# Patient Record
Sex: MALE | Race: WHITE | Employment: OTHER | URBAN - METROPOLITAN AREA
[De-identification: names, ages, dates, MRNs, and addresses within clinical notes are randomized per-mention and may not be internally consistent; named-entity substitution may affect disease eponyms.]

---

## 2017-01-01 ENCOUNTER — ALLIED HEALTH/NURSE VISIT (OUTPATIENT)
Dept: EDUCATION SERVICES | Facility: CLINIC | Age: 82
End: 2017-01-01
Payer: COMMERCIAL

## 2017-01-01 ENCOUNTER — TELEPHONE (OUTPATIENT)
Dept: FAMILY MEDICINE | Facility: CLINIC | Age: 82
End: 2017-01-01

## 2017-01-01 ENCOUNTER — DOCUMENTATION ONLY (OUTPATIENT)
Dept: PHARMACY | Facility: CLINIC | Age: 82
End: 2017-01-01

## 2017-01-01 ENCOUNTER — HOSPITAL ENCOUNTER (OUTPATIENT)
Facility: CLINIC | Age: 82
Setting detail: SPECIMEN
Discharge: HOME | End: 2017-11-29
Attending: INTERNAL MEDICINE | Admitting: INTERNAL MEDICINE
Payer: MEDICARE

## 2017-01-01 ENCOUNTER — SURGERY (OUTPATIENT)
Age: 82
End: 2017-01-01

## 2017-01-01 ENCOUNTER — HOSPITAL ENCOUNTER (OUTPATIENT)
Facility: CLINIC | Age: 82
Discharge: HOME | End: 2017-11-01
Attending: OPHTHALMOLOGY | Admitting: OPHTHALMOLOGY
Payer: MEDICARE

## 2017-01-01 ENCOUNTER — HOSPITAL ENCOUNTER (OUTPATIENT)
Facility: CLINIC | Age: 82
Setting detail: SPECIMEN
Discharge: HOME | End: 2017-08-16
Attending: INTERNAL MEDICINE | Admitting: INTERNAL MEDICINE
Payer: MEDICARE

## 2017-01-01 ENCOUNTER — HOSPITAL ENCOUNTER (OUTPATIENT)
Facility: CLINIC | Age: 82
Setting detail: SPECIMEN
Discharge: HOME | End: 2017-07-05
Attending: INTERNAL MEDICINE | Admitting: INTERNAL MEDICINE
Payer: MEDICARE

## 2017-01-01 ENCOUNTER — HOSPITAL ENCOUNTER (OUTPATIENT)
Facility: CLINIC | Age: 82
Setting detail: SPECIMEN
Discharge: HOME | End: 2017-11-08
Attending: INTERNAL MEDICINE | Admitting: INTERNAL MEDICINE
Payer: MEDICARE

## 2017-01-01 ENCOUNTER — OFFICE VISIT (OUTPATIENT)
Dept: FAMILY MEDICINE | Facility: CLINIC | Age: 82
End: 2017-01-01
Payer: COMMERCIAL

## 2017-01-01 ENCOUNTER — ANESTHESIA (OUTPATIENT)
Dept: SURGERY | Facility: CLINIC | Age: 82
End: 2017-01-01
Payer: MEDICARE

## 2017-01-01 ENCOUNTER — TELEPHONE (OUTPATIENT)
Dept: ONCOLOGY | Facility: CLINIC | Age: 82
End: 2017-01-01

## 2017-01-01 ENCOUNTER — INFUSION THERAPY VISIT (OUTPATIENT)
Dept: INFUSION THERAPY | Facility: CLINIC | Age: 82
End: 2017-01-01
Attending: INTERNAL MEDICINE
Payer: MEDICARE

## 2017-01-01 ENCOUNTER — HOSPITAL ENCOUNTER (OUTPATIENT)
Dept: PHYSICAL THERAPY | Facility: CLINIC | Age: 82
Setting detail: THERAPIES SERIES
End: 2017-06-06
Attending: INTERNAL MEDICINE
Payer: MEDICARE

## 2017-01-01 ENCOUNTER — ONCOLOGY VISIT (OUTPATIENT)
Dept: ONCOLOGY | Facility: CLINIC | Age: 82
End: 2017-01-01
Attending: INTERNAL MEDICINE
Payer: MEDICARE

## 2017-01-01 ENCOUNTER — HOSPITAL ENCOUNTER (OUTPATIENT)
Dept: PHYSICAL THERAPY | Facility: CLINIC | Age: 82
Setting detail: THERAPIES SERIES
End: 2017-06-27
Attending: INTERNAL MEDICINE
Payer: MEDICARE

## 2017-01-01 ENCOUNTER — HOSPITAL ENCOUNTER (OUTPATIENT)
Facility: CLINIC | Age: 82
Setting detail: SPECIMEN
Discharge: HOME | End: 2017-05-31
Attending: INTERNAL MEDICINE | Admitting: INTERNAL MEDICINE
Payer: MEDICARE

## 2017-01-01 ENCOUNTER — HOSPITAL ENCOUNTER (OUTPATIENT)
Dept: PHYSICAL THERAPY | Facility: CLINIC | Age: 82
Setting detail: THERAPIES SERIES
End: 2017-06-13
Attending: INTERNAL MEDICINE
Payer: MEDICARE

## 2017-01-01 ENCOUNTER — OFFICE VISIT (OUTPATIENT)
Dept: URGENT CARE | Facility: URGENT CARE | Age: 82
End: 2017-01-01
Payer: COMMERCIAL

## 2017-01-01 ENCOUNTER — ONCOLOGY VISIT (OUTPATIENT)
Dept: ONCOLOGY | Facility: CLINIC | Age: 82
End: 2017-01-01
Attending: INTERNAL MEDICINE
Payer: COMMERCIAL

## 2017-01-01 ENCOUNTER — HOSPITAL ENCOUNTER (OUTPATIENT)
Facility: CLINIC | Age: 82
Setting detail: SPECIMEN
Discharge: HOME | End: 2017-06-14
Attending: INTERNAL MEDICINE | Admitting: INTERNAL MEDICINE
Payer: MEDICARE

## 2017-01-01 ENCOUNTER — TRANSFERRED RECORDS (OUTPATIENT)
Dept: HEALTH INFORMATION MANAGEMENT | Facility: CLINIC | Age: 82
End: 2017-01-01

## 2017-01-01 ENCOUNTER — HOSPITAL ENCOUNTER (OUTPATIENT)
Dept: PHYSICAL THERAPY | Facility: CLINIC | Age: 82
Setting detail: THERAPIES SERIES
End: 2017-06-20
Attending: INTERNAL MEDICINE
Payer: MEDICARE

## 2017-01-01 ENCOUNTER — HOSPITAL ENCOUNTER (OUTPATIENT)
Facility: CLINIC | Age: 82
Setting detail: SPECIMEN
Discharge: HOME | End: 2017-06-07
Attending: INTERNAL MEDICINE | Admitting: INTERNAL MEDICINE
Payer: MEDICARE

## 2017-01-01 ENCOUNTER — HOSPITAL ENCOUNTER (OUTPATIENT)
Dept: PHYSICAL THERAPY | Facility: CLINIC | Age: 82
Setting detail: THERAPIES SERIES
End: 2017-07-11
Attending: INTERNAL MEDICINE
Payer: MEDICARE

## 2017-01-01 ENCOUNTER — HOSPITAL ENCOUNTER (OUTPATIENT)
Facility: CLINIC | Age: 82
Setting detail: SPECIMEN
Discharge: HOME | End: 2017-07-26
Attending: INTERNAL MEDICINE | Admitting: INTERNAL MEDICINE
Payer: MEDICARE

## 2017-01-01 ENCOUNTER — HOSPITAL ENCOUNTER (OUTPATIENT)
Facility: CLINIC | Age: 82
Setting detail: SPECIMEN
Discharge: HOME | End: 2017-10-18
Attending: INTERNAL MEDICINE | Admitting: INTERNAL MEDICINE
Payer: MEDICARE

## 2017-01-01 ENCOUNTER — HOSPITAL ENCOUNTER (OUTPATIENT)
Facility: CLINIC | Age: 82
Setting detail: SPECIMEN
Discharge: HOME | End: 2017-09-06
Attending: INTERNAL MEDICINE | Admitting: INTERNAL MEDICINE
Payer: MEDICARE

## 2017-01-01 ENCOUNTER — HOSPITAL ENCOUNTER (OUTPATIENT)
Dept: PHYSICAL THERAPY | Facility: CLINIC | Age: 82
Setting detail: THERAPIES SERIES
End: 2017-07-18
Attending: INTERNAL MEDICINE
Payer: MEDICARE

## 2017-01-01 ENCOUNTER — HOSPITAL ENCOUNTER (OUTPATIENT)
Facility: CLINIC | Age: 82
Discharge: HOME | End: 2017-10-25
Attending: OPHTHALMOLOGY | Admitting: OPHTHALMOLOGY
Payer: MEDICARE

## 2017-01-01 ENCOUNTER — HOSPITAL ENCOUNTER (OUTPATIENT)
Facility: CLINIC | Age: 82
Setting detail: SPECIMEN
Discharge: HOME | End: 2017-08-22
Attending: INTERNAL MEDICINE | Admitting: INTERNAL MEDICINE
Payer: MEDICARE

## 2017-01-01 ENCOUNTER — ANESTHESIA EVENT (OUTPATIENT)
Dept: SURGERY | Facility: CLINIC | Age: 82
End: 2017-01-01
Payer: MEDICARE

## 2017-01-01 ENCOUNTER — HOSPITAL ENCOUNTER (OUTPATIENT)
Dept: PHYSICAL THERAPY | Facility: CLINIC | Age: 82
Setting detail: THERAPIES SERIES
End: 2017-05-30
Attending: INTERNAL MEDICINE
Payer: MEDICARE

## 2017-01-01 ENCOUNTER — HOSPITAL ENCOUNTER (OUTPATIENT)
Facility: CLINIC | Age: 82
Setting detail: SPECIMEN
Discharge: HOME | End: 2017-06-21
Attending: INTERNAL MEDICINE | Admitting: INTERNAL MEDICINE
Payer: MEDICARE

## 2017-01-01 ENCOUNTER — TELEPHONE (OUTPATIENT)
Dept: EDUCATION SERVICES | Facility: CLINIC | Age: 82
End: 2017-01-01

## 2017-01-01 ENCOUNTER — HOSPITAL ENCOUNTER (OUTPATIENT)
Facility: CLINIC | Age: 82
Setting detail: SPECIMEN
Discharge: HOME | End: 2017-08-09
Attending: INTERNAL MEDICINE | Admitting: INTERNAL MEDICINE
Payer: MEDICARE

## 2017-01-01 ENCOUNTER — HOSPITAL ENCOUNTER (OUTPATIENT)
Facility: CLINIC | Age: 82
Setting detail: SPECIMEN
Discharge: HOME | End: 2017-06-28
Attending: INTERNAL MEDICINE | Admitting: INTERNAL MEDICINE
Payer: MEDICARE

## 2017-01-01 ENCOUNTER — HOSPITAL ENCOUNTER (OUTPATIENT)
Facility: CLINIC | Age: 82
Setting detail: SPECIMEN
Discharge: HOME | End: 2017-09-27
Attending: INTERNAL MEDICINE | Admitting: INTERNAL MEDICINE
Payer: MEDICARE

## 2017-01-01 ENCOUNTER — HOSPITAL ENCOUNTER (OUTPATIENT)
Dept: PHYSICAL THERAPY | Facility: CLINIC | Age: 82
Setting detail: THERAPIES SERIES
End: 2017-07-06
Attending: INTERNAL MEDICINE
Payer: MEDICARE

## 2017-01-01 ENCOUNTER — RADIANT APPOINTMENT (OUTPATIENT)
Dept: GENERAL RADIOLOGY | Facility: CLINIC | Age: 82
End: 2017-01-01
Attending: FAMILY MEDICINE
Payer: COMMERCIAL

## 2017-01-01 ENCOUNTER — HOSPITAL ENCOUNTER (OUTPATIENT)
Facility: CLINIC | Age: 82
Setting detail: SPECIMEN
Discharge: HOME | End: 2017-12-20
Attending: INTERNAL MEDICINE | Admitting: INTERNAL MEDICINE
Payer: MEDICARE

## 2017-01-01 VITALS
TEMPERATURE: 98 F | SYSTOLIC BLOOD PRESSURE: 121 MMHG | BODY MASS INDEX: 29.21 KG/M2 | WEIGHT: 203.6 LBS | RESPIRATION RATE: 16 BRPM | HEART RATE: 81 BPM | DIASTOLIC BLOOD PRESSURE: 58 MMHG

## 2017-01-01 VITALS
SYSTOLIC BLOOD PRESSURE: 120 MMHG | TEMPERATURE: 98 F | WEIGHT: 200 LBS | BODY MASS INDEX: 28.7 KG/M2 | DIASTOLIC BLOOD PRESSURE: 60 MMHG | HEART RATE: 80 BPM | RESPIRATION RATE: 16 BRPM

## 2017-01-01 VITALS
RESPIRATION RATE: 16 BRPM | WEIGHT: 197 LBS | DIASTOLIC BLOOD PRESSURE: 60 MMHG | BODY MASS INDEX: 28.27 KG/M2 | SYSTOLIC BLOOD PRESSURE: 120 MMHG | TEMPERATURE: 98 F | HEART RATE: 67 BPM

## 2017-01-01 VITALS
BODY MASS INDEX: 29.99 KG/M2 | RESPIRATION RATE: 16 BRPM | DIASTOLIC BLOOD PRESSURE: 67 MMHG | SYSTOLIC BLOOD PRESSURE: 150 MMHG | TEMPERATURE: 97.9 F | WEIGHT: 209 LBS | OXYGEN SATURATION: 95 %

## 2017-01-01 VITALS
HEART RATE: 65 BPM | DIASTOLIC BLOOD PRESSURE: 91 MMHG | TEMPERATURE: 98.8 F | SYSTOLIC BLOOD PRESSURE: 139 MMHG | RESPIRATION RATE: 14 BRPM

## 2017-01-01 VITALS
DIASTOLIC BLOOD PRESSURE: 70 MMHG | SYSTOLIC BLOOD PRESSURE: 142 MMHG | WEIGHT: 196 LBS | HEIGHT: 70 IN | BODY MASS INDEX: 28.06 KG/M2 | TEMPERATURE: 97.9 F | HEART RATE: 65 BPM | RESPIRATION RATE: 16 BRPM

## 2017-01-01 VITALS
SYSTOLIC BLOOD PRESSURE: 121 MMHG | BODY MASS INDEX: 28.55 KG/M2 | DIASTOLIC BLOOD PRESSURE: 53 MMHG | OXYGEN SATURATION: 98 % | RESPIRATION RATE: 18 BRPM | HEIGHT: 70 IN | TEMPERATURE: 98.2 F | WEIGHT: 199.4 LBS | HEART RATE: 72 BPM

## 2017-01-01 VITALS
DIASTOLIC BLOOD PRESSURE: 65 MMHG | HEART RATE: 63 BPM | OXYGEN SATURATION: 96 % | TEMPERATURE: 98.5 F | RESPIRATION RATE: 16 BRPM | SYSTOLIC BLOOD PRESSURE: 143 MMHG

## 2017-01-01 VITALS
BODY MASS INDEX: 27.83 KG/M2 | SYSTOLIC BLOOD PRESSURE: 145 MMHG | DIASTOLIC BLOOD PRESSURE: 62 MMHG | WEIGHT: 194.4 LBS | HEART RATE: 68 BPM | RESPIRATION RATE: 16 BRPM | HEIGHT: 70 IN | TEMPERATURE: 97.8 F

## 2017-01-01 VITALS
BODY MASS INDEX: 27.8 KG/M2 | DIASTOLIC BLOOD PRESSURE: 68 MMHG | SYSTOLIC BLOOD PRESSURE: 135 MMHG | RESPIRATION RATE: 16 BRPM | HEIGHT: 70 IN | TEMPERATURE: 96.9 F | OXYGEN SATURATION: 97 % | HEART RATE: 61 BPM | WEIGHT: 194.2 LBS

## 2017-01-01 VITALS
BODY MASS INDEX: 28.03 KG/M2 | HEART RATE: 64 BPM | HEIGHT: 70 IN | WEIGHT: 195.8 LBS | TEMPERATURE: 98.2 F | DIASTOLIC BLOOD PRESSURE: 62 MMHG | SYSTOLIC BLOOD PRESSURE: 122 MMHG | RESPIRATION RATE: 16 BRPM

## 2017-01-01 VITALS
WEIGHT: 206 LBS | BODY MASS INDEX: 29.49 KG/M2 | RESPIRATION RATE: 16 BRPM | SYSTOLIC BLOOD PRESSURE: 110 MMHG | TEMPERATURE: 97.2 F | OXYGEN SATURATION: 99 % | HEIGHT: 70 IN | DIASTOLIC BLOOD PRESSURE: 54 MMHG | HEART RATE: 72 BPM

## 2017-01-01 VITALS
SYSTOLIC BLOOD PRESSURE: 138 MMHG | OXYGEN SATURATION: 98 % | BODY MASS INDEX: 29.7 KG/M2 | RESPIRATION RATE: 20 BRPM | HEART RATE: 75 BPM | DIASTOLIC BLOOD PRESSURE: 68 MMHG | WEIGHT: 207 LBS

## 2017-01-01 VITALS
BODY MASS INDEX: 28.27 KG/M2 | HEART RATE: 80 BPM | SYSTOLIC BLOOD PRESSURE: 106 MMHG | RESPIRATION RATE: 16 BRPM | TEMPERATURE: 99.5 F | DIASTOLIC BLOOD PRESSURE: 56 MMHG | WEIGHT: 197 LBS

## 2017-01-01 VITALS
WEIGHT: 205.6 LBS | HEART RATE: 77 BPM | BODY MASS INDEX: 29.5 KG/M2 | SYSTOLIC BLOOD PRESSURE: 152 MMHG | DIASTOLIC BLOOD PRESSURE: 73 MMHG | RESPIRATION RATE: 16 BRPM

## 2017-01-01 VITALS
OXYGEN SATURATION: 99 % | BODY MASS INDEX: 28.7 KG/M2 | TEMPERATURE: 98.1 F | DIASTOLIC BLOOD PRESSURE: 68 MMHG | WEIGHT: 200 LBS | HEART RATE: 82 BPM | SYSTOLIC BLOOD PRESSURE: 139 MMHG

## 2017-01-01 VITALS
WEIGHT: 190 LBS | BODY MASS INDEX: 27.26 KG/M2 | SYSTOLIC BLOOD PRESSURE: 128 MMHG | HEART RATE: 63 BPM | DIASTOLIC BLOOD PRESSURE: 60 MMHG | RESPIRATION RATE: 16 BRPM | TEMPERATURE: 98 F

## 2017-01-01 VITALS
HEART RATE: 81 BPM | DIASTOLIC BLOOD PRESSURE: 58 MMHG | SYSTOLIC BLOOD PRESSURE: 121 MMHG | RESPIRATION RATE: 16 BRPM | TEMPERATURE: 98.3 F

## 2017-01-01 VITALS
RESPIRATION RATE: 16 BRPM | SYSTOLIC BLOOD PRESSURE: 152 MMHG | DIASTOLIC BLOOD PRESSURE: 73 MMHG | WEIGHT: 205.6 LBS | BODY MASS INDEX: 29.5 KG/M2 | HEART RATE: 77 BPM

## 2017-01-01 VITALS
TEMPERATURE: 97.7 F | HEART RATE: 61 BPM | BODY MASS INDEX: 27.86 KG/M2 | WEIGHT: 194.2 LBS | SYSTOLIC BLOOD PRESSURE: 150 MMHG | OXYGEN SATURATION: 94 % | RESPIRATION RATE: 19 BRPM | DIASTOLIC BLOOD PRESSURE: 72 MMHG

## 2017-01-01 VITALS
HEIGHT: 70 IN | BODY MASS INDEX: 27.8 KG/M2 | TEMPERATURE: 97.7 F | SYSTOLIC BLOOD PRESSURE: 150 MMHG | OXYGEN SATURATION: 94 % | WEIGHT: 194.2 LBS | HEART RATE: 61 BPM | RESPIRATION RATE: 18 BRPM | DIASTOLIC BLOOD PRESSURE: 72 MMHG

## 2017-01-01 VITALS
WEIGHT: 203 LBS | HEIGHT: 70 IN | BODY MASS INDEX: 29.06 KG/M2 | SYSTOLIC BLOOD PRESSURE: 124 MMHG | RESPIRATION RATE: 16 BRPM | DIASTOLIC BLOOD PRESSURE: 62 MMHG | OXYGEN SATURATION: 98 % | HEART RATE: 80 BPM | TEMPERATURE: 97 F

## 2017-01-01 VITALS
TEMPERATURE: 97 F | OXYGEN SATURATION: 97 % | WEIGHT: 209.1 LBS | BODY MASS INDEX: 29.94 KG/M2 | DIASTOLIC BLOOD PRESSURE: 69 MMHG | HEART RATE: 64 BPM | RESPIRATION RATE: 16 BRPM | HEIGHT: 70 IN | SYSTOLIC BLOOD PRESSURE: 152 MMHG

## 2017-01-01 VITALS
HEART RATE: 71 BPM | TEMPERATURE: 98.5 F | RESPIRATION RATE: 16 BRPM | SYSTOLIC BLOOD PRESSURE: 147 MMHG | DIASTOLIC BLOOD PRESSURE: 68 MMHG

## 2017-01-01 VITALS
WEIGHT: 199.4 LBS | DIASTOLIC BLOOD PRESSURE: 53 MMHG | TEMPERATURE: 98.2 F | HEIGHT: 70 IN | RESPIRATION RATE: 18 BRPM | SYSTOLIC BLOOD PRESSURE: 121 MMHG | HEART RATE: 72 BPM | OXYGEN SATURATION: 98 % | BODY MASS INDEX: 28.55 KG/M2

## 2017-01-01 VITALS
HEART RATE: 65 BPM | TEMPERATURE: 97.3 F | RESPIRATION RATE: 16 BRPM | SYSTOLIC BLOOD PRESSURE: 130 MMHG | BODY MASS INDEX: 28.49 KG/M2 | DIASTOLIC BLOOD PRESSURE: 70 MMHG | WEIGHT: 199 LBS | HEIGHT: 70 IN

## 2017-01-01 VITALS
OXYGEN SATURATION: 97 % | HEIGHT: 70 IN | DIASTOLIC BLOOD PRESSURE: 54 MMHG | WEIGHT: 205 LBS | SYSTOLIC BLOOD PRESSURE: 128 MMHG | TEMPERATURE: 98.5 F | BODY MASS INDEX: 29.35 KG/M2 | HEART RATE: 73 BPM

## 2017-01-01 VITALS
HEART RATE: 70 BPM | SYSTOLIC BLOOD PRESSURE: 160 MMHG | RESPIRATION RATE: 18 BRPM | TEMPERATURE: 98.2 F | DIASTOLIC BLOOD PRESSURE: 72 MMHG

## 2017-01-01 VITALS
DIASTOLIC BLOOD PRESSURE: 70 MMHG | SYSTOLIC BLOOD PRESSURE: 142 MMHG | BODY MASS INDEX: 28.06 KG/M2 | HEIGHT: 70 IN | TEMPERATURE: 97.9 F | WEIGHT: 196 LBS | HEART RATE: 65 BPM | RESPIRATION RATE: 16 BRPM

## 2017-01-01 VITALS
TEMPERATURE: 98.1 F | RESPIRATION RATE: 16 BRPM | SYSTOLIC BLOOD PRESSURE: 137 MMHG | BODY MASS INDEX: 30 KG/M2 | HEART RATE: 72 BPM | WEIGHT: 209.1 LBS | DIASTOLIC BLOOD PRESSURE: 68 MMHG | OXYGEN SATURATION: 96 %

## 2017-01-01 VITALS
SYSTOLIC BLOOD PRESSURE: 147 MMHG | HEART RATE: 62 BPM | BODY MASS INDEX: 29.99 KG/M2 | RESPIRATION RATE: 16 BRPM | DIASTOLIC BLOOD PRESSURE: 71 MMHG | WEIGHT: 209 LBS | TEMPERATURE: 98.5 F | OXYGEN SATURATION: 96 %

## 2017-01-01 VITALS
HEART RATE: 82 BPM | BODY MASS INDEX: 29.27 KG/M2 | WEIGHT: 204 LBS | DIASTOLIC BLOOD PRESSURE: 50 MMHG | RESPIRATION RATE: 14 BRPM | TEMPERATURE: 99.1 F | SYSTOLIC BLOOD PRESSURE: 110 MMHG

## 2017-01-01 VITALS
RESPIRATION RATE: 16 BRPM | DIASTOLIC BLOOD PRESSURE: 55 MMHG | HEART RATE: 69 BPM | SYSTOLIC BLOOD PRESSURE: 123 MMHG | OXYGEN SATURATION: 98 % | TEMPERATURE: 98.2 F

## 2017-01-01 VITALS
TEMPERATURE: 97.9 F | HEART RATE: 82 BPM | RESPIRATION RATE: 18 BRPM | SYSTOLIC BLOOD PRESSURE: 160 MMHG | DIASTOLIC BLOOD PRESSURE: 62 MMHG

## 2017-01-01 DIAGNOSIS — E83.52 HYPERCALCEMIA: ICD-10-CM

## 2017-01-01 DIAGNOSIS — C90.00 MULTIPLE MYELOMA NOT HAVING ACHIEVED REMISSION (H): Primary | ICD-10-CM

## 2017-01-01 DIAGNOSIS — R07.89 CHEST WALL PAIN: Primary | ICD-10-CM

## 2017-01-01 DIAGNOSIS — Z79.4 TYPE 2 DIABETES MELLITUS WITH STAGE 3 CHRONIC KIDNEY DISEASE, WITH LONG-TERM CURRENT USE OF INSULIN (H): Chronic | ICD-10-CM

## 2017-01-01 DIAGNOSIS — N18.30 CKD (CHRONIC KIDNEY DISEASE) STAGE 3, GFR 30-59 ML/MIN (H): Chronic | ICD-10-CM

## 2017-01-01 DIAGNOSIS — T45.1X5A CHEMOTHERAPY-INDUCED NEUTROPENIA (H): ICD-10-CM

## 2017-01-01 DIAGNOSIS — E87.5 HYPERKALEMIA: ICD-10-CM

## 2017-01-01 DIAGNOSIS — R82.90 NONSPECIFIC FINDING ON EXAMINATION OF URINE: ICD-10-CM

## 2017-01-01 DIAGNOSIS — E83.52 HYPERCALCEMIA: Primary | ICD-10-CM

## 2017-01-01 DIAGNOSIS — D53.9 MACROCYTIC ANEMIA: ICD-10-CM

## 2017-01-01 DIAGNOSIS — I10 ESSENTIAL HYPERTENSION, BENIGN: ICD-10-CM

## 2017-01-01 DIAGNOSIS — C90.00 MULTIPLE MYELOMA NOT HAVING ACHIEVED REMISSION (H): ICD-10-CM

## 2017-01-01 DIAGNOSIS — N18.30 TYPE 2 DIABETES MELLITUS WITH STAGE 3 CHRONIC KIDNEY DISEASE, WITH LONG-TERM CURRENT USE OF INSULIN (H): Primary | ICD-10-CM

## 2017-01-01 DIAGNOSIS — N18.30 TYPE 2 DIABETES MELLITUS WITH STAGE 3 CHRONIC KIDNEY DISEASE, WITH LONG-TERM CURRENT USE OF INSULIN (H): Primary | Chronic | ICD-10-CM

## 2017-01-01 DIAGNOSIS — H25.89 OTHER AGE-RELATED CATARACT OF BOTH EYES: ICD-10-CM

## 2017-01-01 DIAGNOSIS — E11.22 TYPE 2 DIABETES MELLITUS WITH STAGE 3 CHRONIC KIDNEY DISEASE, WITH LONG-TERM CURRENT USE OF INSULIN (H): ICD-10-CM

## 2017-01-01 DIAGNOSIS — E11.65 UNCONTROLLED TYPE 2 DIABETES MELLITUS WITH HYPERGLYCEMIA, UNSPECIFIED LONG TERM INSULIN USE STATUS: ICD-10-CM

## 2017-01-01 DIAGNOSIS — N18.30 TYPE 2 DIABETES MELLITUS WITH STAGE 3 CHRONIC KIDNEY DISEASE, WITH LONG-TERM CURRENT USE OF INSULIN (H): ICD-10-CM

## 2017-01-01 DIAGNOSIS — E11.22 TYPE 2 DIABETES MELLITUS WITH STAGE 3 CHRONIC KIDNEY DISEASE, WITH LONG-TERM CURRENT USE OF INSULIN (H): Chronic | ICD-10-CM

## 2017-01-01 DIAGNOSIS — Z79.4 TYPE 2 DIABETES MELLITUS WITH STAGE 3 CHRONIC KIDNEY DISEASE, WITH LONG-TERM CURRENT USE OF INSULIN (H): ICD-10-CM

## 2017-01-01 DIAGNOSIS — Z79.4 TYPE 2 DIABETES MELLITUS WITH STAGE 3 CHRONIC KIDNEY DISEASE, WITH LONG-TERM CURRENT USE OF INSULIN (H): Primary | Chronic | ICD-10-CM

## 2017-01-01 DIAGNOSIS — N30.00 ACUTE CYSTITIS WITHOUT HEMATURIA: ICD-10-CM

## 2017-01-01 DIAGNOSIS — E11.22 TYPE 2 DIABETES MELLITUS WITH STAGE 3 CHRONIC KIDNEY DISEASE, WITH LONG-TERM CURRENT USE OF INSULIN (H): Primary | Chronic | ICD-10-CM

## 2017-01-01 DIAGNOSIS — G62.9 NEUROPATHY: Primary | ICD-10-CM

## 2017-01-01 DIAGNOSIS — D46.9 MDS (MYELODYSPLASTIC SYNDROME) (H): ICD-10-CM

## 2017-01-01 DIAGNOSIS — E11.22 TYPE 2 DIABETES MELLITUS WITH DIABETIC CHRONIC KIDNEY DISEASE, UNSPECIFIED CKD STAGE, UNSPECIFIED LONG TERM INSULIN USE STATUS: ICD-10-CM

## 2017-01-01 DIAGNOSIS — D46.9 MDS (MYELODYSPLASTIC SYNDROME) (H): Primary | ICD-10-CM

## 2017-01-01 DIAGNOSIS — E11.22 TYPE 2 DIABETES MELLITUS WITH STAGE 3 CHRONIC KIDNEY DISEASE, WITH LONG-TERM CURRENT USE OF INSULIN (H): Primary | ICD-10-CM

## 2017-01-01 DIAGNOSIS — E11.22 TYPE 2 DIABETES MELLITUS WITH DIABETIC CHRONIC KIDNEY DISEASE, UNSPECIFIED CKD STAGE, UNSPECIFIED LONG TERM INSULIN USE STATUS: Primary | ICD-10-CM

## 2017-01-01 DIAGNOSIS — E78.5 HYPERLIPIDEMIA: ICD-10-CM

## 2017-01-01 DIAGNOSIS — Z79.4 TYPE 2 DIABETES MELLITUS WITH DIABETIC NEPHROPATHY, WITH LONG-TERM CURRENT USE OF INSULIN (H): Primary | ICD-10-CM

## 2017-01-01 DIAGNOSIS — Z13.89 SCREENING FOR DIABETIC PERIPHERAL NEUROPATHY: ICD-10-CM

## 2017-01-01 DIAGNOSIS — N18.30 TYPE 2 DIABETES MELLITUS WITH STAGE 3 CHRONIC KIDNEY DISEASE, WITH LONG-TERM CURRENT USE OF INSULIN (H): Chronic | ICD-10-CM

## 2017-01-01 DIAGNOSIS — R30.0 DYSURIA: Primary | ICD-10-CM

## 2017-01-01 DIAGNOSIS — R07.89 CHEST WALL PAIN: ICD-10-CM

## 2017-01-01 DIAGNOSIS — Z01.818 PREOP GENERAL PHYSICAL EXAM: Primary | ICD-10-CM

## 2017-01-01 DIAGNOSIS — E87.5 HYPERKALEMIA: Primary | ICD-10-CM

## 2017-01-01 DIAGNOSIS — Z23 NEED FOR PROPHYLACTIC VACCINATION AND INOCULATION AGAINST INFLUENZA: ICD-10-CM

## 2017-01-01 DIAGNOSIS — K61.1 PERIRECTAL ABSCESS: Primary | ICD-10-CM

## 2017-01-01 DIAGNOSIS — N39.0 URINARY TRACT INFECTION WITHOUT HEMATURIA, SITE UNSPECIFIED: Primary | ICD-10-CM

## 2017-01-01 DIAGNOSIS — C67.9 MALIGNANT NEOPLASM OF URINARY BLADDER, UNSPECIFIED SITE (H): ICD-10-CM

## 2017-01-01 DIAGNOSIS — I10 ESSENTIAL HYPERTENSION, BENIGN: Primary | ICD-10-CM

## 2017-01-01 DIAGNOSIS — Z79.4 TYPE 2 DIABETES MELLITUS WITH STAGE 3 CHRONIC KIDNEY DISEASE, WITH LONG-TERM CURRENT USE OF INSULIN (H): Primary | ICD-10-CM

## 2017-01-01 DIAGNOSIS — D63.0 ANEMIA IN NEOPLASTIC DISEASE: ICD-10-CM

## 2017-01-01 DIAGNOSIS — D70.1 CHEMOTHERAPY-INDUCED NEUTROPENIA (H): ICD-10-CM

## 2017-01-01 DIAGNOSIS — C67.9 MALIGNANT NEOPLASM OF BLADDER (H): ICD-10-CM

## 2017-01-01 DIAGNOSIS — E11.21 TYPE 2 DIABETES MELLITUS WITH DIABETIC NEPHROPATHY, WITH LONG-TERM CURRENT USE OF INSULIN (H): Primary | ICD-10-CM

## 2017-01-01 DIAGNOSIS — R30.0 DYSURIA: ICD-10-CM

## 2017-01-01 DIAGNOSIS — I10 ESSENTIAL HYPERTENSION: ICD-10-CM

## 2017-01-01 LAB
ALBUMIN SERPL ELPH-MCNC: 3.4 G/DL (ref 3.7–5.1)
ALBUMIN SERPL ELPH-MCNC: 3.6 G/DL (ref 3.7–5.1)
ALBUMIN SERPL ELPH-MCNC: 3.8 G/DL (ref 3.7–5.1)
ALBUMIN SERPL ELPH-MCNC: 3.8 G/DL (ref 3.7–5.1)
ALBUMIN SERPL ELPH-MCNC: 4.1 G/DL (ref 3.7–5.1)
ALBUMIN SERPL ELPH-MCNC: NORMAL G/DL (ref 3.7–5.1)
ALBUMIN SERPL-MCNC: 2.7 G/DL (ref 3.4–5)
ALBUMIN SERPL-MCNC: 2.8 G/DL (ref 3.4–5)
ALBUMIN SERPL-MCNC: 2.9 G/DL (ref 3.4–5)
ALBUMIN SERPL-MCNC: 2.9 G/DL (ref 3.4–5)
ALBUMIN SERPL-MCNC: 3 G/DL (ref 3.4–5)
ALBUMIN SERPL-MCNC: 3.1 G/DL (ref 3.4–5)
ALBUMIN SERPL-MCNC: 3.2 G/DL (ref 3.4–5)
ALBUMIN UR-MCNC: ABNORMAL MG/DL
ALBUMIN UR-MCNC: ABNORMAL MG/DL
ALBUMIN UR-MCNC: NEGATIVE MG/DL
ALBUMIN UR-MCNC: NEGATIVE MG/DL
ALP SERPL-CCNC: 61 U/L (ref 40–150)
ALP SERPL-CCNC: 62 U/L (ref 40–150)
ALP SERPL-CCNC: 64 U/L (ref 40–150)
ALP SERPL-CCNC: 66 U/L (ref 40–150)
ALP SERPL-CCNC: 66 U/L (ref 40–150)
ALP SERPL-CCNC: 67 U/L (ref 40–150)
ALP SERPL-CCNC: 68 U/L (ref 40–150)
ALP SERPL-CCNC: 72 U/L (ref 40–150)
ALP SERPL-CCNC: 80 U/L (ref 40–150)
ALP SERPL-CCNC: 81 U/L (ref 40–150)
ALP SERPL-CCNC: 82 U/L (ref 40–150)
ALP SERPL-CCNC: 83 U/L (ref 40–150)
ALP SERPL-CCNC: 87 U/L (ref 40–150)
ALPHA1 GLOB SERPL ELPH-MCNC: 0.3 G/DL (ref 0.2–0.4)
ALPHA1 GLOB SERPL ELPH-MCNC: 0.4 G/DL (ref 0.2–0.4)
ALPHA1 GLOB SERPL ELPH-MCNC: 0.4 G/DL (ref 0.2–0.4)
ALPHA1 GLOB SERPL ELPH-MCNC: NORMAL G/DL (ref 0.2–0.4)
ALPHA2 GLOB SERPL ELPH-MCNC: 0.7 G/DL (ref 0.5–0.9)
ALPHA2 GLOB SERPL ELPH-MCNC: 0.7 G/DL (ref 0.5–0.9)
ALPHA2 GLOB SERPL ELPH-MCNC: 0.8 G/DL (ref 0.5–0.9)
ALPHA2 GLOB SERPL ELPH-MCNC: 0.8 G/DL (ref 0.5–0.9)
ALPHA2 GLOB SERPL ELPH-MCNC: 0.9 G/DL (ref 0.5–0.9)
ALPHA2 GLOB SERPL ELPH-MCNC: NORMAL G/DL (ref 0.5–0.9)
ALT SERPL W P-5'-P-CCNC: 12 U/L (ref 0–70)
ALT SERPL W P-5'-P-CCNC: 14 U/L (ref 0–70)
ALT SERPL W P-5'-P-CCNC: 14 U/L (ref 0–70)
ALT SERPL W P-5'-P-CCNC: 15 U/L (ref 0–70)
ALT SERPL W P-5'-P-CCNC: 16 U/L (ref 0–70)
ALT SERPL W P-5'-P-CCNC: 17 U/L (ref 0–70)
ALT SERPL W P-5'-P-CCNC: 18 U/L (ref 0–70)
ALT SERPL W P-5'-P-CCNC: 18 U/L (ref 0–70)
ANION GAP SERPL CALCULATED.3IONS-SCNC: 10 MMOL/L (ref 3–14)
ANION GAP SERPL CALCULATED.3IONS-SCNC: 11 MMOL/L (ref 3–14)
ANION GAP SERPL CALCULATED.3IONS-SCNC: 7 MMOL/L (ref 3–14)
ANION GAP SERPL CALCULATED.3IONS-SCNC: 8 MMOL/L (ref 3–14)
ANION GAP SERPL CALCULATED.3IONS-SCNC: 9 MMOL/L (ref 3–14)
APPEARANCE UR: ABNORMAL
APPEARANCE UR: ABNORMAL
APPEARANCE UR: CLEAR
APPEARANCE UR: CLEAR
AST SERPL W P-5'-P-CCNC: 10 U/L (ref 0–45)
AST SERPL W P-5'-P-CCNC: 10 U/L (ref 0–45)
AST SERPL W P-5'-P-CCNC: 11 U/L (ref 0–45)
AST SERPL W P-5'-P-CCNC: 11 U/L (ref 0–45)
AST SERPL W P-5'-P-CCNC: 12 U/L (ref 0–45)
AST SERPL W P-5'-P-CCNC: 13 U/L (ref 0–45)
AST SERPL W P-5'-P-CCNC: 13 U/L (ref 0–45)
AST SERPL W P-5'-P-CCNC: 14 U/L (ref 0–45)
AST SERPL W P-5'-P-CCNC: 15 U/L (ref 0–45)
AST SERPL W P-5'-P-CCNC: 17 U/L (ref 0–45)
AST SERPL W P-5'-P-CCNC: 17 U/L (ref 0–45)
B-GLOBULIN SERPL ELPH-MCNC: 0.7 G/DL (ref 0.6–1)
B-GLOBULIN SERPL ELPH-MCNC: 0.8 G/DL (ref 0.6–1)
B-GLOBULIN SERPL ELPH-MCNC: NORMAL G/DL (ref 0.6–1)
BACTERIA #/AREA URNS HPF: ABNORMAL /HPF
BACTERIA SPEC CULT: ABNORMAL
BASOPHILS # BLD AUTO: 0 10E9/L (ref 0–0.2)
BASOPHILS NFR BLD AUTO: 0 %
BASOPHILS NFR BLD AUTO: 0.2 %
BILIRUB SERPL-MCNC: 0.4 MG/DL (ref 0.2–1.3)
BILIRUB SERPL-MCNC: 0.5 MG/DL (ref 0.2–1.3)
BILIRUB SERPL-MCNC: 0.6 MG/DL (ref 0.2–1.3)
BILIRUB UR QL STRIP: NEGATIVE
BUN SERPL-MCNC: 26 MG/DL (ref 7–30)
BUN SERPL-MCNC: 26 MG/DL (ref 7–30)
BUN SERPL-MCNC: 30 MG/DL (ref 7–30)
BUN SERPL-MCNC: 31 MG/DL (ref 7–30)
BUN SERPL-MCNC: 32 MG/DL (ref 7–30)
BUN SERPL-MCNC: 33 MG/DL (ref 7–30)
BUN SERPL-MCNC: 34 MG/DL (ref 7–30)
BUN SERPL-MCNC: 35 MG/DL (ref 7–30)
BUN SERPL-MCNC: 36 MG/DL (ref 7–30)
BUN SERPL-MCNC: 36 MG/DL (ref 7–30)
BUN SERPL-MCNC: 39 MG/DL (ref 7–30)
CALCIUM SERPL-MCNC: 8.9 MG/DL (ref 8.5–10.1)
CALCIUM SERPL-MCNC: 9.1 MG/DL (ref 8.5–10.1)
CALCIUM SERPL-MCNC: 9.2 MG/DL (ref 8.5–10.1)
CALCIUM SERPL-MCNC: 9.3 MG/DL (ref 8.5–10.1)
CALCIUM SERPL-MCNC: 9.4 MG/DL (ref 8.5–10.1)
CALCIUM SERPL-MCNC: 9.5 MG/DL (ref 8.5–10.1)
CALCIUM SERPL-MCNC: 9.5 MG/DL (ref 8.5–10.1)
CALCIUM SERPL-MCNC: 9.6 MG/DL (ref 8.5–10.1)
CALCIUM SERPL-MCNC: 9.7 MG/DL (ref 8.5–10.1)
CHLORIDE SERPL-SCNC: 105 MMOL/L (ref 94–109)
CHLORIDE SERPL-SCNC: 106 MMOL/L (ref 94–109)
CHLORIDE SERPL-SCNC: 106 MMOL/L (ref 94–109)
CHLORIDE SERPL-SCNC: 107 MMOL/L (ref 94–109)
CHLORIDE SERPL-SCNC: 108 MMOL/L (ref 94–109)
CHLORIDE SERPL-SCNC: 109 MMOL/L (ref 94–109)
CO2 SERPL-SCNC: 20 MMOL/L (ref 20–32)
CO2 SERPL-SCNC: 20 MMOL/L (ref 20–32)
CO2 SERPL-SCNC: 21 MMOL/L (ref 20–32)
CO2 SERPL-SCNC: 22 MMOL/L (ref 20–32)
CO2 SERPL-SCNC: 23 MMOL/L (ref 20–32)
COLOR UR AUTO: YELLOW
CREAT SERPL-MCNC: 0.96 MG/DL (ref 0.66–1.25)
CREAT SERPL-MCNC: 0.96 MG/DL (ref 0.66–1.25)
CREAT SERPL-MCNC: 0.98 MG/DL (ref 0.66–1.25)
CREAT SERPL-MCNC: 1.04 MG/DL (ref 0.66–1.25)
CREAT SERPL-MCNC: 1.07 MG/DL (ref 0.66–1.25)
CREAT SERPL-MCNC: 1.07 MG/DL (ref 0.66–1.25)
CREAT SERPL-MCNC: 1.08 MG/DL (ref 0.66–1.25)
CREAT SERPL-MCNC: 1.08 MG/DL (ref 0.66–1.25)
CREAT SERPL-MCNC: 1.09 MG/DL (ref 0.66–1.25)
CREAT SERPL-MCNC: 1.11 MG/DL (ref 0.66–1.25)
CREAT SERPL-MCNC: 1.12 MG/DL (ref 0.66–1.25)
CREAT SERPL-MCNC: 1.14 MG/DL (ref 0.66–1.25)
CREAT SERPL-MCNC: 1.19 MG/DL (ref 0.66–1.25)
CREAT SERPL-MCNC: 1.22 MG/DL (ref 0.66–1.25)
CREAT SERPL-MCNC: 1.35 MG/DL (ref 0.66–1.25)
DIFFERENTIAL METHOD BLD: ABNORMAL
EOSINOPHIL # BLD AUTO: 0 10E9/L (ref 0–0.7)
EOSINOPHIL # BLD AUTO: 0.1 10E9/L (ref 0–0.7)
EOSINOPHIL NFR BLD AUTO: 0.2 %
EOSINOPHIL NFR BLD AUTO: 0.3 %
EOSINOPHIL NFR BLD AUTO: 0.4 %
EOSINOPHIL NFR BLD AUTO: 0.4 %
EOSINOPHIL NFR BLD AUTO: 0.5 %
EOSINOPHIL NFR BLD AUTO: 0.6 %
EOSINOPHIL NFR BLD AUTO: 0.9 %
EOSINOPHIL NFR BLD AUTO: 1.1 %
EOSINOPHIL NFR BLD AUTO: 1.3 %
ERYTHROCYTE [DISTWIDTH] IN BLOOD BY AUTOMATED COUNT: 16.4 % (ref 10–15)
ERYTHROCYTE [DISTWIDTH] IN BLOOD BY AUTOMATED COUNT: 16.6 % (ref 10–15)
ERYTHROCYTE [DISTWIDTH] IN BLOOD BY AUTOMATED COUNT: 16.8 % (ref 10–15)
ERYTHROCYTE [DISTWIDTH] IN BLOOD BY AUTOMATED COUNT: 16.9 % (ref 10–15)
ERYTHROCYTE [DISTWIDTH] IN BLOOD BY AUTOMATED COUNT: 17.8 % (ref 10–15)
ERYTHROCYTE [DISTWIDTH] IN BLOOD BY AUTOMATED COUNT: 17.8 % (ref 10–15)
ERYTHROCYTE [DISTWIDTH] IN BLOOD BY AUTOMATED COUNT: 18.3 % (ref 10–15)
ERYTHROCYTE [DISTWIDTH] IN BLOOD BY AUTOMATED COUNT: 18.7 % (ref 10–15)
ERYTHROCYTE [DISTWIDTH] IN BLOOD BY AUTOMATED COUNT: 19.6 % (ref 10–15)
ERYTHROCYTE [DISTWIDTH] IN BLOOD BY AUTOMATED COUNT: 20.6 % (ref 10–15)
ERYTHROCYTE [DISTWIDTH] IN BLOOD BY AUTOMATED COUNT: 23.4 % (ref 10–15)
ERYTHROCYTE [DISTWIDTH] IN BLOOD BY AUTOMATED COUNT: 23.8 % (ref 10–15)
ERYTHROCYTE [DISTWIDTH] IN BLOOD BY AUTOMATED COUNT: 24.1 % (ref 10–15)
ERYTHROCYTE [DISTWIDTH] IN BLOOD BY AUTOMATED COUNT: 24.2 % (ref 10–15)
ERYTHROCYTE [DISTWIDTH] IN BLOOD BY AUTOMATED COUNT: 24.4 % (ref 10–15)
ERYTHROCYTE [DISTWIDTH] IN BLOOD BY AUTOMATED COUNT: 24.4 % (ref 10–15)
GAMMA GLOB SERPL ELPH-MCNC: 1.6 G/DL (ref 0.7–1.6)
GAMMA GLOB SERPL ELPH-MCNC: 1.6 G/DL (ref 0.7–1.6)
GAMMA GLOB SERPL ELPH-MCNC: 1.9 G/DL (ref 0.7–1.6)
GAMMA GLOB SERPL ELPH-MCNC: 2 G/DL (ref 0.7–1.6)
GAMMA GLOB SERPL ELPH-MCNC: 2.8 G/DL (ref 0.7–1.6)
GAMMA GLOB SERPL ELPH-MCNC: NORMAL G/DL (ref 0.7–1.6)
GFR SERPL CREATININE-BSD FRML MDRD: 50 ML/MIN/1.7M2
GFR SERPL CREATININE-BSD FRML MDRD: 56 ML/MIN/1.7M2
GFR SERPL CREATININE-BSD FRML MDRD: 57 ML/MIN/1.7M2
GFR SERPL CREATININE-BSD FRML MDRD: 60 ML/MIN/1.7M2
GFR SERPL CREATININE-BSD FRML MDRD: 61 ML/MIN/1.7M2
GFR SERPL CREATININE-BSD FRML MDRD: 62 ML/MIN/1.7M2
GFR SERPL CREATININE-BSD FRML MDRD: 63 ML/MIN/1.7M2
GFR SERPL CREATININE-BSD FRML MDRD: 64 ML/MIN/1.7M2
GFR SERPL CREATININE-BSD FRML MDRD: 64 ML/MIN/1.7M2
GFR SERPL CREATININE-BSD FRML MDRD: 65 ML/MIN/1.7M2
GFR SERPL CREATININE-BSD FRML MDRD: 65 ML/MIN/1.7M2
GFR SERPL CREATININE-BSD FRML MDRD: 67 ML/MIN/1.7M2
GFR SERPL CREATININE-BSD FRML MDRD: 72 ML/MIN/1.7M2
GFR SERPL CREATININE-BSD FRML MDRD: 73 ML/MIN/1.7M2
GFR SERPL CREATININE-BSD FRML MDRD: 73 ML/MIN/1.7M2
GLUCOSE BLDC GLUCOMTR-MCNC: 124 MG/DL (ref 70–99)
GLUCOSE BLDC GLUCOMTR-MCNC: 141 MG/DL (ref 70–99)
GLUCOSE BLDC GLUCOMTR-MCNC: 157 MG/DL (ref 70–99)
GLUCOSE SERPL-MCNC: 132 MG/DL (ref 70–99)
GLUCOSE SERPL-MCNC: 146 MG/DL (ref 70–99)
GLUCOSE SERPL-MCNC: 193 MG/DL (ref 70–99)
GLUCOSE SERPL-MCNC: 204 MG/DL (ref 70–99)
GLUCOSE SERPL-MCNC: 236 MG/DL (ref 70–99)
GLUCOSE SERPL-MCNC: 237 MG/DL (ref 70–99)
GLUCOSE SERPL-MCNC: 241 MG/DL (ref 70–99)
GLUCOSE SERPL-MCNC: 256 MG/DL (ref 70–99)
GLUCOSE SERPL-MCNC: 259 MG/DL (ref 70–99)
GLUCOSE SERPL-MCNC: 262 MG/DL (ref 70–99)
GLUCOSE SERPL-MCNC: 263 MG/DL (ref 70–99)
GLUCOSE SERPL-MCNC: 306 MG/DL (ref 70–99)
GLUCOSE SERPL-MCNC: 94 MG/DL (ref 70–99)
GLUCOSE UR STRIP-MCNC: NEGATIVE MG/DL
HCT VFR BLD AUTO: 24.1 % (ref 40–53)
HCT VFR BLD AUTO: 24.9 % (ref 40–53)
HCT VFR BLD AUTO: 25.5 % (ref 40–53)
HCT VFR BLD AUTO: 25.6 % (ref 40–53)
HCT VFR BLD AUTO: 26.6 % (ref 40–53)
HCT VFR BLD AUTO: 27 % (ref 40–53)
HCT VFR BLD AUTO: 27.3 % (ref 40–53)
HCT VFR BLD AUTO: 27.5 % (ref 40–53)
HCT VFR BLD AUTO: 27.8 % (ref 40–53)
HCT VFR BLD AUTO: 28.7 % (ref 40–53)
HCT VFR BLD AUTO: 28.8 % (ref 40–53)
HCT VFR BLD AUTO: 28.8 % (ref 40–53)
HCT VFR BLD AUTO: 29.1 % (ref 40–53)
HCT VFR BLD AUTO: 29.4 % (ref 40–53)
HCT VFR BLD AUTO: 31.2 % (ref 40–53)
HCT VFR BLD AUTO: 32.1 % (ref 40–53)
HGB BLD-MCNC: 10.1 G/DL (ref 13.3–17.7)
HGB BLD-MCNC: 10.1 G/DL (ref 13.3–17.7)
HGB BLD-MCNC: 10.7 G/DL (ref 13.3–17.7)
HGB BLD-MCNC: 10.8 G/DL (ref 13.3–17.7)
HGB BLD-MCNC: 8 G/DL (ref 13.3–17.7)
HGB BLD-MCNC: 8.4 G/DL (ref 13.3–17.7)
HGB BLD-MCNC: 8.9 G/DL (ref 13.3–17.7)
HGB BLD-MCNC: 9.1 G/DL (ref 13.3–17.7)
HGB BLD-MCNC: 9.3 G/DL (ref 13.3–17.7)
HGB BLD-MCNC: 9.4 G/DL (ref 13.3–17.7)
HGB BLD-MCNC: 9.4 G/DL (ref 13.3–17.7)
HGB BLD-MCNC: 9.5 G/DL (ref 13.3–17.7)
HGB BLD-MCNC: 9.5 G/DL (ref 13.3–17.7)
HGB BLD-MCNC: 9.7 G/DL (ref 13.3–17.7)
HGB BLD-MCNC: 9.8 G/DL (ref 13.3–17.7)
HGB BLD-MCNC: 9.8 G/DL (ref 13.3–17.7)
HGB UR QL STRIP: ABNORMAL
HGB UR QL STRIP: ABNORMAL
HGB UR QL STRIP: NEGATIVE
HGB UR QL STRIP: NEGATIVE
IGA SERPL-MCNC: 18 MG/DL (ref 70–380)
IGA SERPL-MCNC: 20 MG/DL (ref 70–380)
IGA SERPL-MCNC: 20 MG/DL (ref 70–380)
IGA SERPL-MCNC: 26 MG/DL (ref 70–380)
IGA SERPL-MCNC: 40 MG/DL (ref 70–380)
IGA SERPL-MCNC: NORMAL MG/DL (ref 70–380)
IGG SERPL-MCNC: 306 MG/DL (ref 695–1620)
IGG SERPL-MCNC: 311 MG/DL (ref 695–1620)
IGG SERPL-MCNC: 323 MG/DL (ref 695–1620)
IGG SERPL-MCNC: 341 MG/DL (ref 695–1620)
IGG SERPL-MCNC: 352 MG/DL (ref 695–1620)
IGG SERPL-MCNC: NORMAL MG/DL (ref 695–1620)
IGM SERPL-MCNC: 2200 MG/DL (ref 60–265)
IGM SERPL-MCNC: 2230 MG/DL (ref 60–265)
IGM SERPL-MCNC: 2460 MG/DL (ref 60–265)
IGM SERPL-MCNC: 2670 MG/DL (ref 60–265)
IGM SERPL-MCNC: 3900 MG/DL (ref 60–265)
IGM SERPL-MCNC: NORMAL MG/DL (ref 60–265)
IMM GRANULOCYTES # BLD: 0 10E9/L (ref 0–0.4)
IMM GRANULOCYTES NFR BLD: 0 %
IMM GRANULOCYTES NFR BLD: 0 %
IMM GRANULOCYTES NFR BLD: 0.3 %
IMM GRANULOCYTES NFR BLD: 0.3 %
IMM GRANULOCYTES NFR BLD: 0.4 %
IMM GRANULOCYTES NFR BLD: 0.4 %
IMM GRANULOCYTES NFR BLD: 0.5 %
IMM GRANULOCYTES NFR BLD: 0.6 %
IMM GRANULOCYTES NFR BLD: 0.7 %
IMM GRANULOCYTES NFR BLD: 0.9 %
IMM GRANULOCYTES NFR BLD: 1.4 %
IMMUNOFIXATION ELP: ABNORMAL
IMMUNOFIXATION ELP: ABNORMAL
KAPPA LC UR-MCNC: 198 MG/DL (ref 0.33–1.94)
KAPPA LC UR-MCNC: 48.25 MG/DL (ref 0.33–1.94)
KAPPA LC UR-MCNC: 55 MG/DL (ref 0.33–1.94)
KAPPA LC UR-MCNC: 55.25 MG/DL (ref 0.33–1.94)
KAPPA LC UR-MCNC: 91.25 MG/DL (ref 0.33–1.94)
KAPPA LC UR-MCNC: NORMAL MG/DL (ref 0.33–1.94)
KAPPA LC/LAMBDA SER: 138.26 {RATIO} (ref 0.26–1.65)
KAPPA LC/LAMBDA SER: 309.38 {RATIO} (ref 0.26–1.65)
KAPPA LC/LAMBDA SER: 51.88 {RATIO} (ref 0.26–1.65)
KAPPA LC/LAMBDA SER: 54.46 {RATIO} (ref 0.26–1.65)
KAPPA LC/LAMBDA SER: 63.51 {RATIO} (ref 0.26–1.65)
KAPPA LC/LAMBDA SER: NORMAL {RATIO} (ref 0.26–1.65)
KETONES UR STRIP-MCNC: 15 MG/DL
KETONES UR STRIP-MCNC: NEGATIVE MG/DL
LAMBDA LC SERPL-MCNC: 0.64 MG/DL (ref 0.57–2.63)
LAMBDA LC SERPL-MCNC: 0.66 MG/DL (ref 0.57–2.63)
LAMBDA LC SERPL-MCNC: 0.87 MG/DL (ref 0.57–2.63)
LAMBDA LC SERPL-MCNC: 0.93 MG/DL (ref 0.57–2.63)
LAMBDA LC SERPL-MCNC: 1.01 MG/DL (ref 0.57–2.63)
LAMBDA LC SERPL-MCNC: NORMAL MG/DL (ref 0.57–2.63)
LEUKOCYTE ESTERASE UR QL STRIP: ABNORMAL
LYMPHOCYTES # BLD AUTO: 0.2 10E9/L (ref 0.8–5.3)
LYMPHOCYTES # BLD AUTO: 0.3 10E9/L (ref 0.8–5.3)
LYMPHOCYTES # BLD AUTO: 0.4 10E9/L (ref 0.8–5.3)
LYMPHOCYTES # BLD AUTO: 0.5 10E9/L (ref 0.8–5.3)
LYMPHOCYTES # BLD AUTO: 0.6 10E9/L (ref 0.8–5.3)
LYMPHOCYTES # BLD AUTO: 0.6 10E9/L (ref 0.8–5.3)
LYMPHOCYTES NFR BLD AUTO: 11.1 %
LYMPHOCYTES NFR BLD AUTO: 13.5 %
LYMPHOCYTES NFR BLD AUTO: 13.8 %
LYMPHOCYTES NFR BLD AUTO: 21.1 %
LYMPHOCYTES NFR BLD AUTO: 22.1 %
LYMPHOCYTES NFR BLD AUTO: 5.3 %
LYMPHOCYTES NFR BLD AUTO: 5.6 %
LYMPHOCYTES NFR BLD AUTO: 7.8 %
LYMPHOCYTES NFR BLD AUTO: 7.9 %
LYMPHOCYTES NFR BLD AUTO: 7.9 %
LYMPHOCYTES NFR BLD AUTO: 8.1 %
LYMPHOCYTES NFR BLD AUTO: 8.3 %
LYMPHOCYTES NFR BLD AUTO: 8.4 %
LYMPHOCYTES NFR BLD AUTO: 8.5 %
LYMPHOCYTES NFR BLD AUTO: 8.8 %
LYMPHOCYTES NFR BLD AUTO: 9.4 %
M PROTEIN SERPL ELPH-MCNC: 1.2 G/DL
M PROTEIN SERPL ELPH-MCNC: 1.3 G/DL
M PROTEIN SERPL ELPH-MCNC: 1.6 G/DL
M PROTEIN SERPL ELPH-MCNC: 1.7 G/DL
M PROTEIN SERPL ELPH-MCNC: 2.5 G/DL
M PROTEIN SERPL ELPH-MCNC: NORMAL G/DL
MCH RBC QN AUTO: 35.4 PG (ref 26.5–33)
MCH RBC QN AUTO: 35.8 PG (ref 26.5–33)
MCH RBC QN AUTO: 35.8 PG (ref 26.5–33)
MCH RBC QN AUTO: 36.6 PG (ref 26.5–33)
MCH RBC QN AUTO: 36.7 PG (ref 26.5–33)
MCH RBC QN AUTO: 36.7 PG (ref 26.5–33)
MCH RBC QN AUTO: 37 PG (ref 26.5–33)
MCH RBC QN AUTO: 37 PG (ref 26.5–33)
MCH RBC QN AUTO: 37.6 PG (ref 26.5–33)
MCH RBC QN AUTO: 37.8 PG (ref 26.5–33)
MCH RBC QN AUTO: 38.4 PG (ref 26.5–33)
MCH RBC QN AUTO: 38.5 PG (ref 26.5–33)
MCH RBC QN AUTO: 38.8 PG (ref 26.5–33)
MCH RBC QN AUTO: 39 PG (ref 26.5–33)
MCH RBC QN AUTO: 39.1 PG (ref 26.5–33)
MCH RBC QN AUTO: 39.6 PG (ref 26.5–33)
MCHC RBC AUTO-ENTMCNC: 33.2 G/DL (ref 31.5–36.5)
MCHC RBC AUTO-ENTMCNC: 33.6 G/DL (ref 31.5–36.5)
MCHC RBC AUTO-ENTMCNC: 33.7 G/DL (ref 31.5–36.5)
MCHC RBC AUTO-ENTMCNC: 33.7 G/DL (ref 31.5–36.5)
MCHC RBC AUTO-ENTMCNC: 34 G/DL (ref 31.5–36.5)
MCHC RBC AUTO-ENTMCNC: 34.1 G/DL (ref 31.5–36.5)
MCHC RBC AUTO-ENTMCNC: 34.2 G/DL (ref 31.5–36.5)
MCHC RBC AUTO-ENTMCNC: 34.3 G/DL (ref 31.5–36.5)
MCHC RBC AUTO-ENTMCNC: 34.4 G/DL (ref 31.5–36.5)
MCHC RBC AUTO-ENTMCNC: 34.4 G/DL (ref 31.5–36.5)
MCHC RBC AUTO-ENTMCNC: 34.5 G/DL (ref 31.5–36.5)
MCHC RBC AUTO-ENTMCNC: 34.7 G/DL (ref 31.5–36.5)
MCHC RBC AUTO-ENTMCNC: 34.8 G/DL (ref 31.5–36.5)
MCHC RBC AUTO-ENTMCNC: 34.8 G/DL (ref 31.5–36.5)
MCHC RBC AUTO-ENTMCNC: 35 G/DL (ref 31.5–36.5)
MCHC RBC AUTO-ENTMCNC: 35.7 G/DL (ref 31.5–36.5)
MCV RBC AUTO: 104 FL (ref 78–100)
MCV RBC AUTO: 104 FL (ref 78–100)
MCV RBC AUTO: 105 FL (ref 78–100)
MCV RBC AUTO: 107 FL (ref 78–100)
MCV RBC AUTO: 108 FL (ref 78–100)
MCV RBC AUTO: 109 FL (ref 78–100)
MCV RBC AUTO: 109 FL (ref 78–100)
MCV RBC AUTO: 110 FL (ref 78–100)
MCV RBC AUTO: 110 FL (ref 78–100)
MCV RBC AUTO: 111 FL (ref 78–100)
MCV RBC AUTO: 112 FL (ref 78–100)
MCV RBC AUTO: 113 FL (ref 78–100)
MONOCYTES # BLD AUTO: 0 10E9/L (ref 0–1.3)
MONOCYTES # BLD AUTO: 0.1 10E9/L (ref 0–1.3)
MONOCYTES # BLD AUTO: 0.2 10E9/L (ref 0–1.3)
MONOCYTES # BLD AUTO: 0.2 10E9/L (ref 0–1.3)
MONOCYTES NFR BLD AUTO: 0.8 %
MONOCYTES NFR BLD AUTO: 0.9 %
MONOCYTES NFR BLD AUTO: 1.2 %
MONOCYTES NFR BLD AUTO: 1.3 %
MONOCYTES NFR BLD AUTO: 1.3 %
MONOCYTES NFR BLD AUTO: 1.4 %
MONOCYTES NFR BLD AUTO: 1.6 %
MONOCYTES NFR BLD AUTO: 1.8 %
MONOCYTES NFR BLD AUTO: 2.1 %
MONOCYTES NFR BLD AUTO: 2.1 %
MONOCYTES NFR BLD AUTO: 2.4 %
MONOCYTES NFR BLD AUTO: 2.6 %
MONOCYTES NFR BLD AUTO: 2.7 %
MONOCYTES NFR BLD AUTO: 3.1 %
MONOCYTES NFR BLD AUTO: 4.7 %
MONOCYTES NFR BLD AUTO: 7.3 %
NEUTROPHILS # BLD AUTO: 2 10E9/L (ref 1.6–8.3)
NEUTROPHILS # BLD AUTO: 2 10E9/L (ref 1.6–8.3)
NEUTROPHILS # BLD AUTO: 2.2 10E9/L (ref 1.6–8.3)
NEUTROPHILS # BLD AUTO: 2.3 10E9/L (ref 1.6–8.3)
NEUTROPHILS # BLD AUTO: 2.9 10E9/L (ref 1.6–8.3)
NEUTROPHILS # BLD AUTO: 2.9 10E9/L (ref 1.6–8.3)
NEUTROPHILS # BLD AUTO: 3 10E9/L (ref 1.6–8.3)
NEUTROPHILS # BLD AUTO: 3 10E9/L (ref 1.6–8.3)
NEUTROPHILS # BLD AUTO: 3.1 10E9/L (ref 1.6–8.3)
NEUTROPHILS # BLD AUTO: 3.3 10E9/L (ref 1.6–8.3)
NEUTROPHILS # BLD AUTO: 3.3 10E9/L (ref 1.6–8.3)
NEUTROPHILS # BLD AUTO: 3.5 10E9/L (ref 1.6–8.3)
NEUTROPHILS # BLD AUTO: 3.8 10E9/L (ref 1.6–8.3)
NEUTROPHILS # BLD AUTO: 4 10E9/L (ref 1.6–8.3)
NEUTROPHILS # BLD AUTO: 5 10E9/L (ref 1.6–8.3)
NEUTROPHILS # BLD AUTO: 5.5 10E9/L (ref 1.6–8.3)
NEUTROPHILS NFR BLD AUTO: 68.9 %
NEUTROPHILS NFR BLD AUTO: 73.1 %
NEUTROPHILS NFR BLD AUTO: 82.6 %
NEUTROPHILS NFR BLD AUTO: 84 %
NEUTROPHILS NFR BLD AUTO: 85.7 %
NEUTROPHILS NFR BLD AUTO: 87.6 %
NEUTROPHILS NFR BLD AUTO: 88.2 %
NEUTROPHILS NFR BLD AUTO: 88.2 %
NEUTROPHILS NFR BLD AUTO: 88.6 %
NEUTROPHILS NFR BLD AUTO: 89.1 %
NEUTROPHILS NFR BLD AUTO: 89.6 %
NEUTROPHILS NFR BLD AUTO: 89.8 %
NEUTROPHILS NFR BLD AUTO: 90 %
NEUTROPHILS NFR BLD AUTO: 90.3 %
NEUTROPHILS NFR BLD AUTO: 90.5 %
NEUTROPHILS NFR BLD AUTO: 91.1 %
NITRATE UR QL: NEGATIVE
NITRATE UR QL: POSITIVE
NON-SQ EPI CELLS #/AREA URNS LPF: ABNORMAL /LPF
NON-SQ EPI CELLS #/AREA URNS LPF: ABNORMAL /LPF
NRBC # BLD AUTO: 0 10*3/UL
NRBC BLD AUTO-RTO: 0 /100
NRBC BLD AUTO-RTO: 1 /100
PH UR STRIP: 5 PH (ref 5–7)
PH UR STRIP: 5.5 PH (ref 5–7)
PLATELET # BLD AUTO: 108 10E9/L (ref 150–450)
PLATELET # BLD AUTO: 114 10E9/L (ref 150–450)
PLATELET # BLD AUTO: 116 10E9/L (ref 150–450)
PLATELET # BLD AUTO: 118 10E9/L (ref 150–450)
PLATELET # BLD AUTO: 125 10E9/L (ref 150–450)
PLATELET # BLD AUTO: 132 10E9/L (ref 150–450)
PLATELET # BLD AUTO: 132 10E9/L (ref 150–450)
PLATELET # BLD AUTO: 142 10E9/L (ref 150–450)
PLATELET # BLD AUTO: 143 10E9/L (ref 150–450)
PLATELET # BLD AUTO: 68 10E9/L (ref 150–450)
PLATELET # BLD AUTO: 75 10E9/L (ref 150–450)
PLATELET # BLD AUTO: 78 10E9/L (ref 150–450)
PLATELET # BLD AUTO: 78 10E9/L (ref 150–450)
PLATELET # BLD AUTO: 80 10E9/L (ref 150–450)
POTASSIUM SERPL-SCNC: 4.3 MMOL/L (ref 3.4–5.3)
POTASSIUM SERPL-SCNC: 4.6 MMOL/L (ref 3.4–5.3)
POTASSIUM SERPL-SCNC: 4.7 MMOL/L (ref 3.4–5.3)
POTASSIUM SERPL-SCNC: 4.8 MMOL/L (ref 3.4–5.3)
POTASSIUM SERPL-SCNC: 4.8 MMOL/L (ref 3.4–5.3)
POTASSIUM SERPL-SCNC: 4.9 MMOL/L (ref 3.4–5.3)
POTASSIUM SERPL-SCNC: 5 MMOL/L (ref 3.4–5.3)
POTASSIUM SERPL-SCNC: 5.2 MMOL/L (ref 3.4–5.3)
POTASSIUM SERPL-SCNC: 5.4 MMOL/L (ref 3.4–5.3)
PROT PATTERN SERPL ELPH-IMP: ABNORMAL
PROT PATTERN SERPL ELPH-IMP: NORMAL
PROT PATTERN SERPL IFE-IMP: ABNORMAL
PROT PATTERN SERPL IFE-IMP: NORMAL
PROT SERPL-MCNC: 7.2 G/DL (ref 6.8–8.8)
PROT SERPL-MCNC: 7.3 G/DL (ref 6.8–8.8)
PROT SERPL-MCNC: 7.4 G/DL (ref 6.8–8.8)
PROT SERPL-MCNC: 7.6 G/DL (ref 6.8–8.8)
PROT SERPL-MCNC: 7.7 G/DL (ref 6.8–8.8)
PROT SERPL-MCNC: 7.7 G/DL (ref 6.8–8.8)
PROT SERPL-MCNC: 7.8 G/DL (ref 6.8–8.8)
PROT SERPL-MCNC: 7.9 G/DL (ref 6.8–8.8)
PROT SERPL-MCNC: 8.2 G/DL (ref 6.8–8.8)
PROT SERPL-MCNC: 8.5 G/DL (ref 6.8–8.8)
PROT SERPL-MCNC: 8.7 G/DL (ref 6.8–8.8)
RBC # BLD AUTO: 2.18 10E12/L (ref 4.4–5.9)
RBC # BLD AUTO: 2.27 10E12/L (ref 4.4–5.9)
RBC # BLD AUTO: 2.3 10E12/L (ref 4.4–5.9)
RBC # BLD AUTO: 2.37 10E12/L (ref 4.4–5.9)
RBC # BLD AUTO: 2.38 10E12/L (ref 4.4–5.9)
RBC # BLD AUTO: 2.42 10E12/L (ref 4.4–5.9)
RBC # BLD AUTO: 2.45 10E12/L (ref 4.4–5.9)
RBC # BLD AUTO: 2.57 10E12/L (ref 4.4–5.9)
RBC # BLD AUTO: 2.59 10E12/L (ref 4.4–5.9)
RBC # BLD AUTO: 2.59 10E12/L (ref 4.4–5.9)
RBC # BLD AUTO: 2.65 10E12/L (ref 4.4–5.9)
RBC # BLD AUTO: 2.65 10E12/L (ref 4.4–5.9)
RBC # BLD AUTO: 2.77 10E12/L (ref 4.4–5.9)
RBC # BLD AUTO: 2.78 10E12/L (ref 4.4–5.9)
RBC # BLD AUTO: 2.82 10E12/L (ref 4.4–5.9)
RBC # BLD AUTO: 2.94 10E12/L (ref 4.4–5.9)
RBC #/AREA URNS AUTO: ABNORMAL /HPF
SODIUM SERPL-SCNC: 136 MMOL/L (ref 133–144)
SODIUM SERPL-SCNC: 137 MMOL/L (ref 133–144)
SODIUM SERPL-SCNC: 138 MMOL/L (ref 133–144)
SODIUM SERPL-SCNC: 138 MMOL/L (ref 133–144)
SODIUM SERPL-SCNC: 139 MMOL/L (ref 133–144)
SODIUM SERPL-SCNC: 139 MMOL/L (ref 133–144)
SODIUM SERPL-SCNC: 140 MMOL/L (ref 133–144)
SOURCE: ABNORMAL
SP GR UR STRIP: 1.01 (ref 1–1.03)
SP GR UR STRIP: 1.02 (ref 1–1.03)
SPECIMEN SOURCE: ABNORMAL
UROBILINOGEN UR STRIP-ACNC: 0.2 EU/DL (ref 0.2–1)
WBC # BLD AUTO: 2.6 10E9/L (ref 4–11)
WBC # BLD AUTO: 2.8 10E9/L (ref 4–11)
WBC # BLD AUTO: 2.8 10E9/L (ref 4–11)
WBC # BLD AUTO: 2.9 10E9/L (ref 4–11)
WBC # BLD AUTO: 3.3 10E9/L (ref 4–11)
WBC # BLD AUTO: 3.3 10E9/L (ref 4–11)
WBC # BLD AUTO: 3.4 10E9/L (ref 4–11)
WBC # BLD AUTO: 3.7 10E9/L (ref 4–11)
WBC # BLD AUTO: 3.8 10E9/L (ref 4–11)
WBC # BLD AUTO: 3.9 10E9/L (ref 4–11)
WBC # BLD AUTO: 4.3 10E9/L (ref 4–11)
WBC # BLD AUTO: 4.5 10E9/L (ref 4–11)
WBC # BLD AUTO: 5.5 10E9/L (ref 4–11)
WBC # BLD AUTO: 6.1 10E9/L (ref 4–11)
WBC #/AREA URNS AUTO: ABNORMAL /HPF

## 2017-01-01 PROCEDURE — 25000128 H RX IP 250 OP 636: Performed by: INTERNAL MEDICINE

## 2017-01-01 PROCEDURE — 99214 OFFICE O/P EST MOD 30 MIN: CPT | Performed by: INTERNAL MEDICINE

## 2017-01-01 PROCEDURE — 87086 URINE CULTURE/COLONY COUNT: CPT | Performed by: PHYSICIAN ASSISTANT

## 2017-01-01 PROCEDURE — 99214 OFFICE O/P EST MOD 30 MIN: CPT | Performed by: FAMILY MEDICINE

## 2017-01-01 PROCEDURE — 86334 IMMUNOFIX E-PHORESIS SERUM: CPT | Performed by: INTERNAL MEDICINE

## 2017-01-01 PROCEDURE — 99213 OFFICE O/P EST LOW 20 MIN: CPT | Performed by: FAMILY MEDICINE

## 2017-01-01 PROCEDURE — 80053 COMPREHEN METABOLIC PANEL: CPT | Performed by: INTERNAL MEDICINE

## 2017-01-01 PROCEDURE — G0108 DIAB MANAGE TRN  PER INDIV: HCPCS

## 2017-01-01 PROCEDURE — 96365 THER/PROPH/DIAG IV INF INIT: CPT

## 2017-01-01 PROCEDURE — 82784 ASSAY IGA/IGD/IGG/IGM EACH: CPT | Performed by: INTERNAL MEDICINE

## 2017-01-01 PROCEDURE — 97110 THERAPEUTIC EXERCISES: CPT | Mod: GP | Performed by: PHYSICAL THERAPIST

## 2017-01-01 PROCEDURE — 85025 COMPLETE CBC W/AUTO DIFF WBC: CPT | Performed by: INTERNAL MEDICINE

## 2017-01-01 PROCEDURE — 40000185 ZZHC STATISTIC PT OUTPT VISIT: Performed by: PHYSICAL THERAPIST

## 2017-01-01 PROCEDURE — 96401 CHEMO ANTI-NEOPL SQ/IM: CPT

## 2017-01-01 PROCEDURE — 82040 ASSAY OF SERUM ALBUMIN: CPT | Performed by: INTERNAL MEDICINE

## 2017-01-01 PROCEDURE — 36415 COLL VENOUS BLD VENIPUNCTURE: CPT

## 2017-01-01 PROCEDURE — 00000402 ZZHCL STATISTIC TOTAL PROTEIN: Performed by: INTERNAL MEDICINE

## 2017-01-01 PROCEDURE — 99213 OFFICE O/P EST LOW 20 MIN: CPT | Mod: 25 | Performed by: FAMILY MEDICINE

## 2017-01-01 PROCEDURE — 36000101 ZZH EYE SURGERY LEVEL 3 1ST 30 MIN: Performed by: OPHTHALMOLOGY

## 2017-01-01 PROCEDURE — V2632 POST CHMBR INTRAOCULAR LENS: HCPCS | Performed by: OPHTHALMOLOGY

## 2017-01-01 PROCEDURE — 84165 PROTEIN E-PHORESIS SERUM: CPT | Performed by: INTERNAL MEDICINE

## 2017-01-01 PROCEDURE — 87086 URINE CULTURE/COLONY COUNT: CPT | Performed by: FAMILY MEDICINE

## 2017-01-01 PROCEDURE — 96372 THER/PROPH/DIAG INJ SC/IM: CPT

## 2017-01-01 PROCEDURE — 84132 ASSAY OF SERUM POTASSIUM: CPT | Performed by: INTERNAL MEDICINE

## 2017-01-01 PROCEDURE — 83883 ASSAY NEPHELOMETRY NOT SPEC: CPT | Performed by: INTERNAL MEDICINE

## 2017-01-01 PROCEDURE — 25000128 H RX IP 250 OP 636: Performed by: NURSE ANESTHETIST, CERTIFIED REGISTERED

## 2017-01-01 PROCEDURE — 25000128 H RX IP 250 OP 636: Performed by: ANESTHESIOLOGY

## 2017-01-01 PROCEDURE — 25000128 H RX IP 250 OP 636: Mod: JW | Performed by: INTERNAL MEDICINE

## 2017-01-01 PROCEDURE — 87088 URINE BACTERIA CULTURE: CPT | Performed by: FAMILY MEDICINE

## 2017-01-01 PROCEDURE — 71101 X-RAY EXAM UNILAT RIBS/CHEST: CPT | Mod: LT

## 2017-01-01 PROCEDURE — 25000125 ZZHC RX 250: Performed by: NURSE ANESTHETIST, CERTIFIED REGISTERED

## 2017-01-01 PROCEDURE — 82962 GLUCOSE BLOOD TEST: CPT

## 2017-01-01 PROCEDURE — 99211 OFF/OP EST MAY X REQ PHY/QHP: CPT

## 2017-01-01 PROCEDURE — 71000028 ZZH EYE RECOVERY PHASE 2 EACH 15 MINS: Performed by: OPHTHALMOLOGY

## 2017-01-01 PROCEDURE — 99211 OFF/OP EST MAY X REQ PHY/QHP: CPT | Mod: 25

## 2017-01-01 PROCEDURE — 37000008 ZZH ANESTHESIA TECHNICAL FEE, 1ST 30 MIN: Performed by: OPHTHALMOLOGY

## 2017-01-01 PROCEDURE — 99215 OFFICE O/P EST HI 40 MIN: CPT | Performed by: INTERNAL MEDICINE

## 2017-01-01 PROCEDURE — 27210794 ZZH OR GENERAL SUPPLY STERILE: Performed by: OPHTHALMOLOGY

## 2017-01-01 PROCEDURE — 81001 URINALYSIS AUTO W/SCOPE: CPT | Performed by: PHYSICIAN ASSISTANT

## 2017-01-01 PROCEDURE — 25000125 ZZHC RX 250: Performed by: OPHTHALMOLOGY

## 2017-01-01 PROCEDURE — 25000128 H RX IP 250 OP 636: Performed by: OPHTHALMOLOGY

## 2017-01-01 PROCEDURE — 87186 SC STD MICRODIL/AGAR DIL: CPT | Performed by: FAMILY MEDICINE

## 2017-01-01 PROCEDURE — 90662 IIV NO PRSV INCREASED AG IM: CPT | Performed by: FAMILY MEDICINE

## 2017-01-01 PROCEDURE — 40000170 ZZH STATISTIC PRE-PROCEDURE ASSESSMENT II: Performed by: OPHTHALMOLOGY

## 2017-01-01 PROCEDURE — 25000128 H RX IP 250 OP 636: Mod: EC | Performed by: INTERNAL MEDICINE

## 2017-01-01 PROCEDURE — 96372 THER/PROPH/DIAG INJ SC/IM: CPT | Mod: XU

## 2017-01-01 PROCEDURE — 82565 ASSAY OF CREATININE: CPT | Performed by: INTERNAL MEDICINE

## 2017-01-01 PROCEDURE — 40000360 ZZHC STATISTIC PT CANCER REHAB VISIT: Performed by: PHYSICAL THERAPIST

## 2017-01-01 PROCEDURE — 97112 NEUROMUSCULAR REEDUCATION: CPT | Mod: GP | Performed by: PHYSICAL THERAPIST

## 2017-01-01 PROCEDURE — 82310 ASSAY OF CALCIUM: CPT | Performed by: INTERNAL MEDICINE

## 2017-01-01 PROCEDURE — 25000125 ZZHC RX 250: Performed by: ANESTHESIOLOGY

## 2017-01-01 PROCEDURE — 87088 URINE BACTERIA CULTURE: CPT | Performed by: PHYSICIAN ASSISTANT

## 2017-01-01 PROCEDURE — G8979 MOBILITY GOAL STATUS: HCPCS | Mod: GP,CI | Performed by: PHYSICAL THERAPIST

## 2017-01-01 PROCEDURE — G8980 MOBILITY D/C STATUS: HCPCS | Mod: GP,CJ | Performed by: PHYSICAL THERAPIST

## 2017-01-01 PROCEDURE — 81001 URINALYSIS AUTO W/SCOPE: CPT | Performed by: FAMILY MEDICINE

## 2017-01-01 PROCEDURE — 96372 THER/PROPH/DIAG INJ SC/IM: CPT | Mod: 59

## 2017-01-01 PROCEDURE — G0008 ADMIN INFLUENZA VIRUS VAC: HCPCS | Performed by: FAMILY MEDICINE

## 2017-01-01 PROCEDURE — 96372 THER/PROPH/DIAG INJ SC/IM: CPT | Mod: XS

## 2017-01-01 PROCEDURE — 99213 OFFICE O/P EST LOW 20 MIN: CPT | Performed by: INTERNAL MEDICINE

## 2017-01-01 PROCEDURE — 87186 SC STD MICRODIL/AGAR DIL: CPT | Performed by: PHYSICIAN ASSISTANT

## 2017-01-01 PROCEDURE — 99213 OFFICE O/P EST LOW 20 MIN: CPT | Performed by: PHYSICIAN ASSISTANT

## 2017-01-01 PROCEDURE — 99207 C FOOT EXAM  NO CHARGE: CPT | Performed by: FAMILY MEDICINE

## 2017-01-01 PROCEDURE — 99212 OFFICE O/P EST SF 10 MIN: CPT

## 2017-01-01 DEVICE — EYE IMP IOL AMO PCL TECNIS ZCB00 19.5: Type: IMPLANTABLE DEVICE | Site: EYE | Status: FUNCTIONAL

## 2017-01-01 DEVICE — EYE IMP IOL AMO PCL TECNIS ZCB00 20.0: Type: IMPLANTABLE DEVICE | Site: EYE | Status: FUNCTIONAL

## 2017-01-01 RX ORDER — EPINEPHRINE 1 MG/ML
0.3 INJECTION INTRAMUSCULAR; INTRAVENOUS; SUBCUTANEOUS EVERY 5 MIN PRN
Status: CANCELLED | OUTPATIENT
Start: 2017-01-01

## 2017-01-01 RX ORDER — LIDOCAINE HYDROCHLORIDE 10 MG/ML
INJECTION, SOLUTION EPIDURAL; INFILTRATION; INTRACAUDAL; PERINEURAL PRN
Status: DISCONTINUED | OUTPATIENT
Start: 2017-01-01 | End: 2017-01-01 | Stop reason: HOSPADM

## 2017-01-01 RX ORDER — EPINEPHRINE 1 MG/ML
0.3 INJECTION, SOLUTION INTRAMUSCULAR; SUBCUTANEOUS EVERY 5 MIN PRN
Status: CANCELLED | OUTPATIENT
Start: 2018-01-01

## 2017-01-01 RX ORDER — MEPERIDINE HYDROCHLORIDE 25 MG/ML
25 INJECTION INTRAMUSCULAR; INTRAVENOUS; SUBCUTANEOUS EVERY 30 MIN PRN
Status: CANCELLED | OUTPATIENT
Start: 2017-01-01

## 2017-01-01 RX ORDER — EPINEPHRINE 0.3 MG/.3ML
0.3 INJECTION SUBCUTANEOUS EVERY 5 MIN PRN
Status: CANCELLED | OUTPATIENT
Start: 2018-01-01

## 2017-01-01 RX ORDER — DEXAMETHASONE 4 MG/1
TABLET ORAL
Qty: 40 TABLET | Refills: 0 | OUTPATIENT
Start: 2017-01-01

## 2017-01-01 RX ORDER — MEPERIDINE HYDROCHLORIDE 25 MG/ML
25 INJECTION INTRAMUSCULAR; INTRAVENOUS; SUBCUTANEOUS EVERY 30 MIN PRN
Status: CANCELLED | OUTPATIENT
Start: 2018-01-01

## 2017-01-01 RX ORDER — BALANCED SALT SOLUTION 6.4; .75; .48; .3; 3.9; 1.7 MG/ML; MG/ML; MG/ML; MG/ML; MG/ML; MG/ML
SOLUTION OPHTHALMIC PRN
Status: DISCONTINUED | OUTPATIENT
Start: 2017-01-01 | End: 2017-01-01 | Stop reason: HOSPADM

## 2017-01-01 RX ORDER — ALBUTEROL SULFATE 0.83 MG/ML
2.5 SOLUTION RESPIRATORY (INHALATION)
Status: CANCELLED | OUTPATIENT
Start: 2018-01-01

## 2017-01-01 RX ORDER — ALBUTEROL SULFATE 90 UG/1
1-2 AEROSOL, METERED RESPIRATORY (INHALATION)
Status: CANCELLED
Start: 2018-01-01

## 2017-01-01 RX ORDER — DEXAMETHASONE 4 MG/1
40 TABLET ORAL
Qty: 40 TABLET | Refills: 0 | Status: SHIPPED | OUTPATIENT
Start: 2017-01-01 | End: 2017-01-01

## 2017-01-01 RX ORDER — PHENYLEPHRINE HYDROCHLORIDE 25 MG/ML
1 SOLUTION/ DROPS OPHTHALMIC
Status: COMPLETED | OUTPATIENT
Start: 2017-01-01 | End: 2017-01-01

## 2017-01-01 RX ORDER — FUROSEMIDE 20 MG
20 TABLET ORAL DAILY
Qty: 30 TABLET | Refills: 3 | Status: SHIPPED | OUTPATIENT
Start: 2017-01-01 | End: 2018-01-01

## 2017-01-01 RX ORDER — ALBUTEROL SULFATE 90 UG/1
1-2 AEROSOL, METERED RESPIRATORY (INHALATION)
Status: CANCELLED
Start: 2017-01-01

## 2017-01-01 RX ORDER — PROPARACAINE HYDROCHLORIDE 5 MG/ML
1 SOLUTION/ DROPS OPHTHALMIC ONCE
Status: COMPLETED | OUTPATIENT
Start: 2017-01-01 | End: 2017-01-01

## 2017-01-01 RX ORDER — LORAZEPAM 2 MG/ML
0.5 INJECTION INTRAMUSCULAR EVERY 4 HOURS PRN
Status: CANCELLED
Start: 2017-01-01

## 2017-01-01 RX ORDER — BLOOD-GLUCOSE METER
KIT MISCELLANEOUS
Qty: 200 STRIP | Refills: 1 | Status: SHIPPED | OUTPATIENT
Start: 2017-01-01 | End: 2018-01-01

## 2017-01-01 RX ORDER — CYCLOPHOSPHAMIDE 50 MG/1
CAPSULE ORAL
Qty: 24 CAPSULE | Refills: 0 | OUTPATIENT
Start: 2017-01-01

## 2017-01-01 RX ORDER — SODIUM CHLORIDE 9 MG/ML
1000 INJECTION, SOLUTION INTRAVENOUS CONTINUOUS PRN
Status: CANCELLED
Start: 2017-01-01

## 2017-01-01 RX ORDER — PROPARACAINE HYDROCHLORIDE 5 MG/ML
1 SOLUTION/ DROPS OPHTHALMIC ONCE
Status: DISCONTINUED | OUTPATIENT
Start: 2017-01-01 | End: 2017-01-01 | Stop reason: HOSPADM

## 2017-01-01 RX ORDER — FUROSEMIDE 20 MG
TABLET ORAL
Qty: 30 TABLET | Refills: 3 | Status: CANCELLED | OUTPATIENT
Start: 2017-01-01

## 2017-01-01 RX ORDER — DICLOFENAC SODIUM 1 MG/ML
1 SOLUTION/ DROPS OPHTHALMIC
Status: COMPLETED | OUTPATIENT
Start: 2017-01-01 | End: 2017-01-01

## 2017-01-01 RX ORDER — EPINEPHRINE 0.3 MG/.3ML
0.3 INJECTION SUBCUTANEOUS EVERY 5 MIN PRN
Status: CANCELLED | OUTPATIENT
Start: 2017-01-01

## 2017-01-01 RX ORDER — DIPHENHYDRAMINE HYDROCHLORIDE 50 MG/ML
50 INJECTION INTRAMUSCULAR; INTRAVENOUS
Status: CANCELLED
Start: 2018-01-01

## 2017-01-01 RX ORDER — CIPROFLOXACIN 250 MG/1
250 TABLET, FILM COATED ORAL 2 TIMES DAILY
Qty: 20 TABLET | Refills: 0 | Status: ON HOLD | OUTPATIENT
Start: 2017-01-01 | End: 2017-01-01

## 2017-01-01 RX ORDER — CYCLOPHOSPHAMIDE 50 MG/1
300 CAPSULE ORAL WEEKLY
Qty: 24 CAPSULE | Refills: 0 | Status: SHIPPED | OUTPATIENT
Start: 2017-01-01 | End: 2017-01-01

## 2017-01-01 RX ORDER — SODIUM CHLORIDE, SODIUM LACTATE, POTASSIUM CHLORIDE, CALCIUM CHLORIDE 600; 310; 30; 20 MG/100ML; MG/100ML; MG/100ML; MG/100ML
500 INJECTION, SOLUTION INTRAVENOUS CONTINUOUS
Status: DISCONTINUED | OUTPATIENT
Start: 2017-01-01 | End: 2017-01-01 | Stop reason: HOSPADM

## 2017-01-01 RX ORDER — ACYCLOVIR 400 MG/1
TABLET ORAL
Qty: 60 TABLET | Refills: 0 | OUTPATIENT
Start: 2017-01-01

## 2017-01-01 RX ORDER — MOXIFLOXACIN 5 MG/ML
1 SOLUTION/ DROPS OPHTHALMIC
Status: COMPLETED | OUTPATIENT
Start: 2017-01-01 | End: 2017-01-01

## 2017-01-01 RX ORDER — METHYLPREDNISOLONE SODIUM SUCCINATE 125 MG/2ML
125 INJECTION, POWDER, LYOPHILIZED, FOR SOLUTION INTRAMUSCULAR; INTRAVENOUS
Status: CANCELLED
Start: 2018-01-01

## 2017-01-01 RX ORDER — LORAZEPAM 2 MG/ML
0.5 INJECTION INTRAMUSCULAR EVERY 4 HOURS PRN
Status: CANCELLED
Start: 2018-01-01

## 2017-01-01 RX ORDER — ALBUTEROL SULFATE 0.83 MG/ML
2.5 SOLUTION RESPIRATORY (INHALATION)
Status: CANCELLED | OUTPATIENT
Start: 2017-01-01

## 2017-01-01 RX ORDER — DIPHENHYDRAMINE HYDROCHLORIDE 50 MG/ML
50 INJECTION INTRAMUSCULAR; INTRAVENOUS
Status: CANCELLED
Start: 2017-01-01

## 2017-01-01 RX ORDER — GABAPENTIN 100 MG/1
200 CAPSULE ORAL AT BEDTIME
Qty: 90 CAPSULE | Refills: 3 | Status: SHIPPED | OUTPATIENT
Start: 2017-01-01 | End: 2017-01-01

## 2017-01-01 RX ORDER — GEMFIBROZIL 600 MG/1
TABLET, FILM COATED ORAL
Qty: 180 TABLET | Refills: 1 | Status: ON HOLD | OUTPATIENT
Start: 2017-01-01 | End: 2018-01-01

## 2017-01-01 RX ORDER — NITROFURANTOIN 25; 75 MG/1; MG/1
100 CAPSULE ORAL DAILY
Qty: 14 CAPSULE | Refills: 0 | Status: SHIPPED | OUTPATIENT
Start: 2017-01-01 | End: 2017-01-01

## 2017-01-01 RX ORDER — AMLODIPINE BESYLATE 5 MG/1
TABLET ORAL
Qty: 30 TABLET | Refills: 5 | Status: SHIPPED | OUTPATIENT
Start: 2017-01-01 | End: 2018-01-01

## 2017-01-01 RX ORDER — LISINOPRIL 30 MG/1
TABLET ORAL
Qty: 90 TABLET | Refills: 0 | Status: SHIPPED | OUTPATIENT
Start: 2017-01-01 | End: 2017-01-01

## 2017-01-01 RX ORDER — METHYLPREDNISOLONE SODIUM SUCCINATE 125 MG/2ML
125 INJECTION, POWDER, LYOPHILIZED, FOR SOLUTION INTRAMUSCULAR; INTRAVENOUS
Status: CANCELLED
Start: 2017-01-01

## 2017-01-01 RX ORDER — SODIUM POLYSTYRENE SULFONATE 15 G/60ML
15 SUSPENSION ORAL; RECTAL ONCE
Qty: 60 ML | Refills: 0 | Status: SHIPPED | OUTPATIENT
Start: 2017-01-01 | End: 2017-01-01

## 2017-01-01 RX ORDER — TROPICAMIDE 10 MG/ML
1 SOLUTION/ DROPS OPHTHALMIC
Status: COMPLETED | OUTPATIENT
Start: 2017-01-01 | End: 2017-01-01

## 2017-01-01 RX ORDER — LISINOPRIL 30 MG/1
15 TABLET ORAL DAILY
Qty: 90 TABLET | Refills: 0 | Status: ON HOLD
Start: 2017-01-01 | End: 2018-01-01

## 2017-01-01 RX ORDER — GLIPIZIDE 10 MG/1
10 TABLET ORAL
Qty: 180 TABLET | Refills: 1 | Status: ON HOLD | OUTPATIENT
Start: 2017-01-01 | End: 2018-01-01

## 2017-01-01 RX ORDER — CIPROFLOXACIN 250 MG/1
250 TABLET, FILM COATED ORAL 2 TIMES DAILY
Qty: 20 TABLET | Refills: 0 | Status: SHIPPED | OUTPATIENT
Start: 2017-01-01 | End: 2017-01-01

## 2017-01-01 RX ORDER — ONDANSETRON 2 MG/ML
INJECTION INTRAMUSCULAR; INTRAVENOUS PRN
Status: DISCONTINUED | OUTPATIENT
Start: 2017-01-01 | End: 2017-01-01

## 2017-01-01 RX ORDER — SODIUM CHLORIDE 9 MG/ML
1000 INJECTION, SOLUTION INTRAVENOUS CONTINUOUS PRN
Status: CANCELLED
Start: 2018-01-01

## 2017-01-01 RX ORDER — GLIPIZIDE 10 MG/1
TABLET ORAL
Qty: 180 TABLET | Refills: 1 | Status: SHIPPED | OUTPATIENT
Start: 2017-01-01 | End: 2017-01-01

## 2017-01-01 RX ORDER — PEN NEEDLE, DIABETIC 31 GX5/16"
NEEDLE, DISPOSABLE MISCELLANEOUS
Qty: 500 EACH | Refills: 1 | Status: SHIPPED | OUTPATIENT
Start: 2017-01-01

## 2017-01-01 RX ORDER — FENTANYL CITRATE 50 UG/ML
INJECTION, SOLUTION INTRAMUSCULAR; INTRAVENOUS PRN
Status: DISCONTINUED | OUTPATIENT
Start: 2017-01-01 | End: 2017-01-01

## 2017-01-01 RX ORDER — NITROFURANTOIN 25; 75 MG/1; MG/1
100 CAPSULE ORAL 2 TIMES DAILY
Qty: 20 CAPSULE | Refills: 0 | Status: SHIPPED | OUTPATIENT
Start: 2017-01-01 | End: 2017-01-01

## 2017-01-01 RX ORDER — METOPROLOL TARTRATE 25 MG/1
TABLET, FILM COATED ORAL
Qty: 180 TABLET | Refills: 1 | Status: SHIPPED | OUTPATIENT
Start: 2017-01-01 | End: 2018-01-01

## 2017-01-01 RX ADMIN — DICLOFENAC SODIUM 1 DROP: 1 SOLUTION/ DROPS OPHTHALMIC at 07:59

## 2017-01-01 RX ADMIN — ZOLEDRONIC ACID 3.3 MG: 0.8 INJECTION, SOLUTION, CONCENTRATE INTRAVENOUS at 14:10

## 2017-01-01 RX ADMIN — DEXMEDETOMIDINE HYDROCHLORIDE 4 MCG: 100 INJECTION, SOLUTION INTRAVENOUS at 10:00

## 2017-01-01 RX ADMIN — DARBEPOETIN ALFA 300 MCG: 300 INJECTION, SOLUTION INTRAVENOUS; SUBCUTANEOUS at 10:34

## 2017-01-01 RX ADMIN — BORTEZOMIB 2.1 MG: 3.5 INJECTION, POWDER, LYOPHILIZED, FOR SOLUTION INTRAVENOUS; SUBCUTANEOUS at 10:45

## 2017-01-01 RX ADMIN — DICLOFENAC SODIUM 1 DROP: 1 SOLUTION/ DROPS OPHTHALMIC at 08:06

## 2017-01-01 RX ADMIN — PHENYLEPHRINE HYDROCHLORIDE 1 DROP: 2.5 SOLUTION/ DROPS OPHTHALMIC at 08:17

## 2017-01-01 RX ADMIN — ZOLEDRONIC ACID 3.5 MG: 4 INJECTION, SOLUTION, CONCENTRATE INTRAVENOUS at 12:22

## 2017-01-01 RX ADMIN — TROPICAMIDE 1 DROP: 10 SOLUTION/ DROPS OPHTHALMIC at 07:59

## 2017-01-01 RX ADMIN — TROPICAMIDE 1 DROP: 10 SOLUTION/ DROPS OPHTHALMIC at 06:33

## 2017-01-01 RX ADMIN — PROPARACAINE HYDROCHLORIDE 1 DROP: 5 SOLUTION/ DROPS OPHTHALMIC at 07:59

## 2017-01-01 RX ADMIN — ZOLEDRONIC ACID 3.3 MG: 0.8 INJECTION, SOLUTION, CONCENTRATE INTRAVENOUS at 14:21

## 2017-01-01 RX ADMIN — BALANCED SALT SOLUTION 15 ML: 6.4; .75; .48; .3; 3.9; 1.7 SOLUTION OPHTHALMIC at 10:07

## 2017-01-01 RX ADMIN — BORTEZOMIB 2.1 MG: 3.5 INJECTION, POWDER, LYOPHILIZED, FOR SOLUTION INTRAVENOUS; SUBCUTANEOUS at 10:55

## 2017-01-01 RX ADMIN — PHENYLEPHRINE HYDROCHLORIDE 1 DROP: 2.5 SOLUTION/ DROPS OPHTHALMIC at 07:58

## 2017-01-01 RX ADMIN — MOXIFLOXACIN 1 DROP: 5 SOLUTION/ DROPS OPHTHALMIC at 06:27

## 2017-01-01 RX ADMIN — LIDOCAINE HYDROCHLORIDE 1 ML: 10 INJECTION, SOLUTION EPIDURAL; INFILTRATION; INTRACAUDAL; PERINEURAL at 06:35

## 2017-01-01 RX ADMIN — SODIUM CHLORIDE 250 ML: 9 INJECTION, SOLUTION INTRAVENOUS at 14:34

## 2017-01-01 RX ADMIN — TROPICAMIDE 1 DROP: 10 SOLUTION/ DROPS OPHTHALMIC at 06:18

## 2017-01-01 RX ADMIN — TROPICAMIDE 1 DROP: 10 SOLUTION/ DROPS OPHTHALMIC at 08:06

## 2017-01-01 RX ADMIN — DARBEPOETIN ALFA 300 MCG: 300 INJECTION, SOLUTION INTRAVENOUS; SUBCUTANEOUS at 11:58

## 2017-01-01 RX ADMIN — SODIUM CHLORIDE, SODIUM LACTATE, POTASSIUM CHLORIDE, CALCIUM CHLORIDE 500 ML: 600; 310; 30; 20 INJECTION, SOLUTION INTRAVENOUS at 06:35

## 2017-01-01 RX ADMIN — BORTEZOMIB 2.1 MG: 3.5 INJECTION, POWDER, LYOPHILIZED, FOR SOLUTION INTRAVENOUS; SUBCUTANEOUS at 11:15

## 2017-01-01 RX ADMIN — DICLOFENAC SODIUM 1 DROP: 1 SOLUTION OPHTHALMIC at 06:27

## 2017-01-01 RX ADMIN — DICLOFENAC SODIUM 1 DROP: 1 SOLUTION/ DROPS OPHTHALMIC at 08:17

## 2017-01-01 RX ADMIN — SODIUM CHLORIDE, POTASSIUM CHLORIDE, SODIUM LACTATE AND CALCIUM CHLORIDE 500 ML: 600; 310; 30; 20 INJECTION, SOLUTION INTRAVENOUS at 08:19

## 2017-01-01 RX ADMIN — DARBEPOETIN ALFA 300 MCG: 300 INJECTION, SOLUTION INTRAVENOUS; SUBCUTANEOUS at 10:52

## 2017-01-01 RX ADMIN — FENTANYL CITRATE 50 MCG: 50 INJECTION, SOLUTION INTRAMUSCULAR; INTRAVENOUS at 07:31

## 2017-01-01 RX ADMIN — DICLOFENAC SODIUM 1 DROP: 1 SOLUTION OPHTHALMIC at 06:33

## 2017-01-01 RX ADMIN — DARBEPOETIN ALFA 300 MCG: 300 INJECTION, SOLUTION INTRAVENOUS; SUBCUTANEOUS at 12:22

## 2017-01-01 RX ADMIN — PROPARACAINE HYDROCHLORIDE 1 DROP: 5 SOLUTION/ DROPS OPHTHALMIC at 06:18

## 2017-01-01 RX ADMIN — LIDOCAINE HYDROCHLORIDE 1 ML: 10 INJECTION, SOLUTION EPIDURAL; INFILTRATION; INTRACAUDAL; PERINEURAL at 08:19

## 2017-01-01 RX ADMIN — ONDANSETRON 4 MG: 2 INJECTION INTRAMUSCULAR; INTRAVENOUS at 10:00

## 2017-01-01 RX ADMIN — LIDOCAINE HYDROCHLORIDE 1 ML: 10 INJECTION, SOLUTION EPIDURAL; INFILTRATION; INTRACAUDAL; PERINEURAL at 10:08

## 2017-01-01 RX ADMIN — SODIUM CHLORIDE 250 ML: 9 INJECTION, SOLUTION INTRAVENOUS at 11:13

## 2017-01-01 RX ADMIN — FENTANYL CITRATE 25 MCG: 50 INJECTION, SOLUTION INTRAMUSCULAR; INTRAVENOUS at 07:35

## 2017-01-01 RX ADMIN — PHENYLEPHRINE HYDROCHLORIDE 1 DROP: 2.5 SOLUTION/ DROPS OPHTHALMIC at 06:33

## 2017-01-01 RX ADMIN — SODIUM CHLORIDE 250 ML: 9 INJECTION, SOLUTION INTRAVENOUS at 14:10

## 2017-01-01 RX ADMIN — DEXMEDETOMIDINE HYDROCHLORIDE 8 MCG: 100 INJECTION, SOLUTION INTRAVENOUS at 09:57

## 2017-01-01 RX ADMIN — PHENYLEPHRINE HYDROCHLORIDE 1 DROP: 2.5 SOLUTION/ DROPS OPHTHALMIC at 06:27

## 2017-01-01 RX ADMIN — TROPICAMIDE 1 DROP: 10 SOLUTION/ DROPS OPHTHALMIC at 06:27

## 2017-01-01 RX ADMIN — PHENYLEPHRINE HYDROCHLORIDE 1 DROP: 2.5 SOLUTION/ DROPS OPHTHALMIC at 06:18

## 2017-01-01 RX ADMIN — MOXIFLOXACIN 1 DROP: 5 SOLUTION/ DROPS OPHTHALMIC at 08:06

## 2017-01-01 RX ADMIN — MOXIFLOXACIN 1 DROP: 5 SOLUTION/ DROPS OPHTHALMIC at 08:17

## 2017-01-01 RX ADMIN — LIDOCAINE HYDROCHLORIDE 0.5 ML: 35 GEL OPHTHALMIC at 10:07

## 2017-01-01 RX ADMIN — ZOLEDRONIC ACID 3.3 MG: 0.8 INJECTION, SOLUTION, CONCENTRATE INTRAVENOUS at 11:14

## 2017-01-01 RX ADMIN — MOXIFLOXACIN 1 DROP: 5 SOLUTION/ DROPS OPHTHALMIC at 07:59

## 2017-01-01 RX ADMIN — DARBEPOETIN ALFA 300 MCG: 300 INJECTION, SOLUTION INTRAVENOUS; SUBCUTANEOUS at 14:28

## 2017-01-01 RX ADMIN — SODIUM CHLORIDE 100 ML: 9 INJECTION, SOLUTION INTRAVENOUS at 14:20

## 2017-01-01 RX ADMIN — SODIUM CHLORIDE, SODIUM LACTATE, POTASSIUM CHLORIDE, CALCIUM CHLORIDE: 600; 310; 30; 20 INJECTION, SOLUTION INTRAVENOUS at 07:26

## 2017-01-01 RX ADMIN — SODIUM CHLORIDE 250 ML: 9 INJECTION, SOLUTION INTRAVENOUS at 12:22

## 2017-01-01 RX ADMIN — ZOLEDRONIC ACID 3.3 MG: 4 INJECTION, SOLUTION, CONCENTRATE INTRAVENOUS at 12:51

## 2017-01-01 RX ADMIN — BORTEZOMIB 2.1 MG: 3.5 INJECTION, POWDER, LYOPHILIZED, FOR SOLUTION INTRAVENOUS; SUBCUTANEOUS at 10:04

## 2017-01-01 RX ADMIN — DICLOFENAC SODIUM 1 DROP: 1 SOLUTION OPHTHALMIC at 06:18

## 2017-01-01 RX ADMIN — MOXIFLOXACIN 1 DROP: 5 SOLUTION/ DROPS OPHTHALMIC at 06:33

## 2017-01-01 RX ADMIN — DARBEPOETIN ALFA 300 MCG: 300 INJECTION, SOLUTION INTRAVENOUS; SUBCUTANEOUS at 09:32

## 2017-01-01 RX ADMIN — SODIUM CHONDROITIN SULFATE / SODIUM HYALURONATE 1 ML: 0.55-0.5 INJECTION INTRAOCULAR at 10:08

## 2017-01-01 RX ADMIN — DARBEPOETIN ALFA 300 MCG: 300 INJECTION, SOLUTION INTRAVENOUS; SUBCUTANEOUS at 11:34

## 2017-01-01 RX ADMIN — ZOLEDRONIC ACID 3.5 MG: 0.8 INJECTION, SOLUTION, CONCENTRATE INTRAVENOUS at 14:34

## 2017-01-01 RX ADMIN — DARBEPOETIN ALFA 300 MCG: 300 INJECTION, SOLUTION INTRAVENOUS; SUBCUTANEOUS at 12:15

## 2017-01-01 RX ADMIN — TROPICAMIDE 1 DROP: 10 SOLUTION/ DROPS OPHTHALMIC at 08:17

## 2017-01-01 RX ADMIN — EPINEPHRINE 500 ML: 1 INJECTION, SOLUTION, CONCENTRATE INTRAVENOUS at 10:07

## 2017-01-01 RX ADMIN — ONDANSETRON 4 MG: 2 INJECTION INTRAMUSCULAR; INTRAVENOUS at 07:32

## 2017-01-01 RX ADMIN — MOXIFLOXACIN 1 DROP: 5 SOLUTION/ DROPS OPHTHALMIC at 06:18

## 2017-01-01 RX ADMIN — PHENYLEPHRINE HYDROCHLORIDE 1 DROP: 2.5 SOLUTION/ DROPS OPHTHALMIC at 08:06

## 2017-01-01 RX ADMIN — FENTANYL CITRATE 25 MCG: 50 INJECTION, SOLUTION INTRAMUSCULAR; INTRAVENOUS at 07:41

## 2017-01-01 ASSESSMENT — PAIN SCALES - GENERAL
PAINLEVEL: NO PAIN (0)
PAINLEVEL: MILD PAIN (2)
PAINLEVEL: EXTREME PAIN (8)
PAINLEVEL: NO PAIN (0)
PAINLEVEL: MILD PAIN (2)
PAINLEVEL: NO PAIN (0)
PAINLEVEL: MILD PAIN (2)

## 2017-01-01 ASSESSMENT — ENCOUNTER SYMPTOMS
DYSRHYTHMIAS: 1
SEIZURES: 0
DYSRHYTHMIAS: 1
SEIZURES: 0

## 2017-01-01 ASSESSMENT — LIFESTYLE VARIABLES
TOBACCO_USE: 0
TOBACCO_USE: 0

## 2017-01-01 ASSESSMENT — 6 MINUTE WALK TEST (6MWT): TOTAL DISTANCE WALKED (METERS): 304

## 2017-01-02 ENCOUNTER — TELEPHONE (OUTPATIENT)
Dept: FAMILY MEDICINE | Facility: CLINIC | Age: 82
End: 2017-01-02

## 2017-01-02 NOTE — TELEPHONE ENCOUNTER
Called nurse with information below. Agustina notes pt has HC orders until Wednesday. New orders needed  if patient is to continue with HC. Message will be sent to PCP for orders.

## 2017-01-02 NOTE — TELEPHONE ENCOUNTER
Agustina HC nurse called back to follow on discharge orders and advice if HC should be continued. Message was routed to PCP instead of provider team. Please advise. Nurse notes pt's goals are met and VS are WNL. Except for complaint of dizziness.

## 2017-01-02 NOTE — TELEPHONE ENCOUNTER
"HOME care went to discharge patient today from home care and noted low BP which is usually low and states today was 100/60 and reports slight dizziness, lightheadedness, wondering if provider would like to have anything further done.  States patient being followed in clinic close renu due to receiving chemo.  States usually in 115-120/high 60's and has progressively coming down.  States does think he can drink fluids more and encouraged to do so by nurse.  Nurse reports all other VS were WNL.  Heart today was 74.  Temp 97.2, Resp 16 O2 99% on RA    Wondering if OK to Discontinue from Home care since patient is being followed in clinic with multiple appointments or if provider would like home care to continue to follow with this concern?  States other than this patient is doing \"fabulous\".    Please advise.    "

## 2017-01-03 ENCOUNTER — TELEPHONE (OUTPATIENT)
Dept: FAMILY MEDICINE | Facility: CLINIC | Age: 82
End: 2017-01-03

## 2017-01-03 NOTE — TELEPHONE ENCOUNTER
Left detailed voice message with doctors response. Asked nurse call triage back if new referral needed.

## 2017-01-04 ENCOUNTER — HOSPITAL ENCOUNTER (OUTPATIENT)
Facility: CLINIC | Age: 82
Setting detail: SPECIMEN
Discharge: HOME | End: 2017-01-04
Attending: INTERNAL MEDICINE | Admitting: INTERNAL MEDICINE
Payer: MEDICARE

## 2017-01-04 ENCOUNTER — INFUSION THERAPY VISIT (OUTPATIENT)
Dept: INFUSION THERAPY | Facility: CLINIC | Age: 82
End: 2017-01-04
Attending: INTERNAL MEDICINE
Payer: MEDICARE

## 2017-01-04 ENCOUNTER — ONCOLOGY VISIT (OUTPATIENT)
Dept: ONCOLOGY | Facility: CLINIC | Age: 82
End: 2017-01-04
Attending: INTERNAL MEDICINE
Payer: MEDICARE

## 2017-01-04 VITALS
OXYGEN SATURATION: 100 % | DIASTOLIC BLOOD PRESSURE: 66 MMHG | TEMPERATURE: 97.1 F | RESPIRATION RATE: 16 BRPM | SYSTOLIC BLOOD PRESSURE: 136 MMHG | HEART RATE: 80 BPM

## 2017-01-04 VITALS
HEART RATE: 72 BPM | DIASTOLIC BLOOD PRESSURE: 85 MMHG | TEMPERATURE: 97.8 F | RESPIRATION RATE: 16 BRPM | SYSTOLIC BLOOD PRESSURE: 158 MMHG

## 2017-01-04 DIAGNOSIS — C90.00 MULTIPLE MYELOMA NOT HAVING ACHIEVED REMISSION (H): Primary | ICD-10-CM

## 2017-01-04 DIAGNOSIS — D53.9 MACROCYTIC ANEMIA: ICD-10-CM

## 2017-01-04 LAB
ABO + RH BLD: NORMAL
ABO + RH BLD: NORMAL
BASOPHILS # BLD AUTO: 0 10E9/L (ref 0–0.2)
BASOPHILS NFR BLD AUTO: 0 %
BLD GP AB SCN SERPL QL: NORMAL
BLD PROD TYP BPU: NORMAL
BLD PROD TYP BPU: NORMAL
BLD UNIT ID BPU: 0
BLOOD BANK CMNT PATIENT-IMP: NORMAL
BLOOD PRODUCT CODE: NORMAL
BPU ID: NORMAL
DIFFERENTIAL METHOD BLD: ABNORMAL
EOSINOPHIL # BLD AUTO: 0 10E9/L (ref 0–0.7)
EOSINOPHIL NFR BLD AUTO: 0 %
ERYTHROCYTE [DISTWIDTH] IN BLOOD BY AUTOMATED COUNT: 21.4 % (ref 10–15)
HCT VFR BLD AUTO: 22.8 % (ref 40–53)
HGB BLD-MCNC: 7.9 G/DL (ref 13.3–17.7)
IMM GRANULOCYTES # BLD: 0 10E9/L (ref 0–0.4)
IMM GRANULOCYTES NFR BLD: 0.4 %
LYMPHOCYTES # BLD AUTO: 0.6 10E9/L (ref 0.8–5.3)
LYMPHOCYTES NFR BLD AUTO: 23 %
MCH RBC QN AUTO: 35.4 PG (ref 26.5–33)
MCHC RBC AUTO-ENTMCNC: 34.6 G/DL (ref 31.5–36.5)
MCV RBC AUTO: 102 FL (ref 78–100)
MONOCYTES # BLD AUTO: 0 10E9/L (ref 0–1.3)
MONOCYTES NFR BLD AUTO: 0.4 %
NEUTROPHILS # BLD AUTO: 1.8 10E9/L (ref 1.6–8.3)
NEUTROPHILS NFR BLD AUTO: 76.2 %
NRBC # BLD AUTO: 0 10*3/UL
NRBC BLD AUTO-RTO: 0 /100
NUM BPU REQUESTED: 1
PLATELET # BLD AUTO: 39 10E9/L (ref 150–450)
RBC # BLD AUTO: 2.23 10E12/L (ref 4.4–5.9)
SPECIMEN EXP DATE BLD: NORMAL
TRANSFUSION STATUS PATIENT QL: NORMAL
TRANSFUSION STATUS PATIENT QL: NORMAL
WBC # BLD AUTO: 2.4 10E9/L (ref 4–11)

## 2017-01-04 PROCEDURE — 99211 OFF/OP EST MAY X REQ PHY/QHP: CPT | Mod: 25

## 2017-01-04 PROCEDURE — 86901 BLOOD TYPING SEROLOGIC RH(D): CPT | Performed by: INTERNAL MEDICINE

## 2017-01-04 PROCEDURE — 36430 TRANSFUSION BLD/BLD COMPNT: CPT

## 2017-01-04 PROCEDURE — P9016 RBC LEUKOCYTES REDUCED: HCPCS | Performed by: INTERNAL MEDICINE

## 2017-01-04 PROCEDURE — 85025 COMPLETE CBC W/AUTO DIFF WBC: CPT | Performed by: INTERNAL MEDICINE

## 2017-01-04 PROCEDURE — 86850 RBC ANTIBODY SCREEN: CPT | Performed by: INTERNAL MEDICINE

## 2017-01-04 PROCEDURE — 86923 COMPATIBILITY TEST ELECTRIC: CPT | Performed by: INTERNAL MEDICINE

## 2017-01-04 PROCEDURE — 99214 OFFICE O/P EST MOD 30 MIN: CPT | Performed by: INTERNAL MEDICINE

## 2017-01-04 PROCEDURE — 96401 CHEMO ANTI-NEOPL SQ/IM: CPT

## 2017-01-04 PROCEDURE — 86900 BLOOD TYPING SEROLOGIC ABO: CPT | Performed by: INTERNAL MEDICINE

## 2017-01-04 PROCEDURE — 25000128 H RX IP 250 OP 636: Performed by: INTERNAL MEDICINE

## 2017-01-04 RX ORDER — ACYCLOVIR 400 MG/1
400 TABLET ORAL 2 TIMES DAILY
Qty: 60 TABLET | Refills: 3 | Status: SHIPPED | OUTPATIENT
Start: 2017-01-04 | End: 2018-01-01

## 2017-01-04 RX ADMIN — BORTEZOMIB 2.1 MG: 3.5 INJECTION, POWDER, LYOPHILIZED, FOR SOLUTION INTRAVENOUS; SUBCUTANEOUS at 15:22

## 2017-01-04 ASSESSMENT — PAIN SCALES - GENERAL
PAINLEVEL: NO PAIN (0)
PAINLEVEL: NO PAIN (0)

## 2017-01-04 NOTE — MR AVS SNAPSHOT
After Visit Summary   1/4/2017    Roc Parkinson    MRN: [id number]           Patient Information     Date Of Birth          7/7/1926        Visit Information        Provider Department      1/4/2017 2:15 PM Gretel Quinn MD [facility]        Today's Diagnoses     Multiple myeloma not having achieved remission (H)    -  1       Care Instructions    1.  Continue Velcade and dexamethasone. Velcade today.  2.  CBC, diff, CMP weekly- transfuse to keep hemoglobin of 8 and platelet of 10,000.   3.  RTC MD 2 weeks        Follow-ups after your visit        Your next 10 appointments already scheduled     Jan 11, 2017 11:00 AM   Level 1 with NORTH CHAIR 10   [facility] ([facility])    [facility]  [address]  [place] [address]   [phone]            Jan 18, 2017 10:00 AM   Level 1 with NORTH CHAIR 10   [facility] ([facility])    [facility] [place] [id number]  [place] [address]   [phone]            Jan 18, 2017 10:30 AM   Return Visit with Gretel Quinn MD   [facility] ([facility])    [facility]  [address]  [place] [address]   [phone]            Jan 25, 2017 10:30 AM   Level 1 with NORTH CHAIR 7   [facility] ([facility])    [facility]  [address]  [place] [address]   [phone]            Feb 01, 2017 11:00 AM   Level 1 with NORTH CHAIR 5   [facility] ([facility])    [facility]  [address]  [place] [address]   [phone]            Feb 01, 2017 11:30 AM   Return Visit with Gretel Quinn MD   [facility] ([facility])    [facility]  [address]  [place] [address]   [phone]            Feb 08, 2017  1:00 PM   Level 1 with NORTH CHAIR 10   [facility] ([facility])    [facility]  [address]  [place] [address]   [phone]              Future tests that were ordered for you today     Open Standing Orders        Priority Remaining Interval Expires Ordered    Comprehensive metabolic panel Routine 50/50  1/4/2018 1/4/2017    Red blood cell prepare order unit Routine 99/100 CONDITIONAL (SPECIFY) BLOOD  1/4/2017    Transfuse red blood cell unit Routine 99/100 TRANSFUSE 1 UNIT  1/4/2017            Who to contact     If you have questions or need follow up information about today's clinic visit or your schedule please contact [facility] directly at [phone].  Normal or non-critical lab and imaging results will be communicated to you by JumpStarthart, letter or phone within 4 business days after the clinic has received the results. If you do not hear from us within 7 days, please contact the clinic through JumpStarthart or phone. If you have a critical or abnormal lab result, we will notify you by phone as soon as possible.  Submit refill requests through Nu-B-2B or call your pharmacy and they will forward the refill request to us. Please allow 3 business days for your refill to be completed.          Additional Information About Your Visit        Nu-B-2B Information     Nu-B-2B lets you send messages to your doctor, view your test results, renew your prescriptions, schedule appointments and more. To sign up, go to [email or web address] . Click on \"Log in\" on the left side of the screen, which will take you to the Welcome page. Then click on \"Sign up Now\" on the right side of the page.     You will be asked to enter the access code listed below, as well as some personal information. Please follow the directions to create your username and " password.     Your access code is: [email or web address]  Expires: 3/28/2017 12:15 PM     Your access code will  in 90 days. If you need help or a new code, please call your [facility] or [phone].        Care EveryWhere ID     This is your Care EveryWhere ID. This could be used by other organizations to access your [facility] medical records  [id number]        Your Vitals Were     Pulse Temperature Respirations Pulse Oximetry          80 97.1  F (36.2  C) (Oral) 16 100%         Blood Pressure from Last 3 Encounters:   17 136/66   17 136/66   16 154/75    Weight from Last 3 Encounters:   16 86.002 kg (189 lb 9.6 oz)   16 85.639 kg (188 lb 12.8 oz)   16 84.7 kg (186 lb 11.7 oz)                 Today's Medication Changes          These changes are accurate as of: 17  3:10 PM.  If you have any questions, ask your nurse or doctor.               These medicines have changed or have updated prescriptions.        Dose/Directions    dexamethasone 4 MG tablet   Commonly known as:  DECADRON   This may have changed:    - how much to take  - how to take this  - when to take this  - additional instructions   Used for:  Multiple myeloma not having achieved remission (H)        Take 20 mg weekly   Quantity:  20 tablet   Refills:  0            Where to get your medicines      These medications were sent to [facility] [place]  [facility]OB[facility] - [address]  [address] [address]     Phone:  [phone]    - acyclovir 400 MG tablet             Primary Care Provider Office Phone # Fax #    Dickson Reich [phone]       [facility] [address]  [facility] [address]        Thank you!     Thank you for choosing [facility]  for your care. Our goal is always to provide you with excellent care. Hearing back from our patients is one way we can continue to improve our services. Please take a few minutes to  complete the written survey that you may receive in the mail after your visit with us. Thank you!             Your Updated Medication List - Protect others around you: Learn how to safely use, store and throw away your medicines at www.disposemymeds.org.          This list is accurate as of: 1/4/17  3:10 PM.  Always use your most recent med list.                   Brand Name Dispense Instructions for use    acyclovir 400 MG tablet    ZOVIRAX    60 tablet    Take 1 tablet (400 mg) by mouth 2 times daily Viral Prophylaxis.       amLODIPine 5 MG tablet    NORVASC    30 tablet    Take 1 tablet (5 mg) by mouth daily       dexamethasone 4 MG tablet    DECADRON    20 tablet    Take 20 mg weekly       ferrous sulfate 325 (65 FE) MG tablet    IRON     Take 325 mg by mouth daily (with breakfast)       FREESTYLE LITE test strip   Generic drug:  blood glucose monitoring     200 each    USE TO TEST BLOOD GLUCOSE TWICE DAILY       gemfibrozil 600 MG tablet    LOPID    180 tablet    TAKE 1 TABLET BY MOUTH TWICE DAILY       glipiZIDE 10 MG tablet    GLUCOTROL    180 tablet    TAKE 1 TABLET BY MOUTH TWICE DAILY BEFORE A MEAL       * insulin aspart 100 UNIT/ML injection    NovoLOG PEN    9 mL    Inject 1-7 Units Subcutaneous 3 times daily (before meals)       * insulin aspart 100 UNIT/ML injection    NovoLOG PEN     Inject 1-5 Units Subcutaneous At Bedtime       levothyroxine 25 MCG tablet    SYNTHROID/LEVOTHROID    90 tablet    TAKE 1 TABLET BY MOUTH EVERY DAY       lisinopril 30 MG tablet    PRINIVIL,ZESTRIL    90 tablet    TAKE 1 TABLET BY MOUTH EVERY DAY       metoprolol 25 MG tablet    LOPRESSOR    180 tablet    TAKE 1 TABLET BY MOUTH TWICE DAILY       omeprazole 20 MG CR capsule    priLOSEC    90 capsule    TAKE 1 CAPSULE BY MOUTH EVERY DAY       ondansetron 8 MG tablet    ZOFRAN    10 tablet    Take 1 tablet (8 mg) by mouth every 8 hours as needed (Nausea/Vomiting)       polyethylene glycol Packet    MIRALAX/GLYCOLAX     Take  17 g by mouth daily       prochlorperazine 5 MG tablet    COMPAZINE    30 tablet    Take 1 tablet (5 mg) by mouth every 6 hours as needed (Nausea/Vomiting)       Selenium 200 MCG Tabs      Take 100 mcg by mouth daily. Pt takes one half tab of the 200 mcg daily       tamsulosin 0.4 MG capsule    FLOMAX    90 capsule    TAKE 1 CAPSULE BY MOUTH DAILY       VITAMIN C PO      Take 1,000 mg by mouth daily       VITAMIN D3 PO      Take 1,000 Units by mouth daily       * Notice:  This list has 2 medication(s) that are the same as other medications prescribed for you. Read the directions carefully, and ask your doctor or other care provider to review them with you.

## 2017-01-04 NOTE — PROGRESS NOTES
Infusion Nursing Note:  Roc Parkinson presents today for C1D22 Velcade.    Patient seen by provider today: Yes: Dr. Quinn    Note: N/A.    Intravenous Access:  Lab draw site Left AC, Needle type Butterfly, Gauge 23.  PIV  placed.     Treatment Conditions:  HGB      7.9   1/4/2017  WBC      2.4   1/4/2017   ANEU      1.8   1/4/2017  PLT       39   1/4/2017     Results reviewed, labs did NOT meet treatment parameters: Hgb 7.8; pt will get 1 unit of blood per ordres. Plts 39; pt okay to get velcade with low platelet count per Dr. Villaseñor.  Blood consent signed 12/21/16.      Post Infusion Assessment:  Patient tolerated infusion without incident.  Blood return noted pre and post infusion.  Site patent and intact, free from redness, edema or discomfort.  No evidence of extravasations.  Access discontinued per protocol.    Discharge Plan:   Patient declined prescription refills.  Discharge instructions reviewed with: Patient.  Patient and/or family verbalized understanding of discharge instructions and all questions answered.  Copy of AVS reviewed with patient and/or family.  Patient will return 1/11/17 for next appointment.  Patient discharged in stable condition accompanied by: self.  Departure Mode: Ambulatory.    Esthela Vargas RN

## 2017-01-04 NOTE — PROGRESS NOTES
[facility] PHYSICIANS  HEMATOLOGY ONCOLOGY     DIAGNOSIS:  Kappa light chain multiple myeloma in a 89-year-old patient.  Bone marrow biopsy on 11/17/2016 showed hypercellular bone marrow with 65% cellularity of light chain restricted plasma cells.  There is a background of myelodysplastic syndrome.  Cytogenetics and FISH panel are pending at this point.  The patient was initially seen in the hospital on 11/17/2016 for macrocytic anemia and pancytopenia and transfusion requirement and a bone marrow biopsy was performed.      TREATMENT: 12/15/16 velcade/dex      SUBJECTIVE:  The patient was seen as a followup today.  He is tolerating treatment well. No neuropathy.     REVIEW OF SYSTEMS:  A complete review of systems was performed and found to be negative other than pertinent positives mentioned in history of present illness.     Past medical, social histories reviewed.    Meds- Reviewed.     PHYSICAL EXAMINATION:   VITAL SIGNS:  /66 mmHg  Pulse 80  Temp(Src) 97.1  F (36.2  C) (Oral)  Resp 16  SpO2 100%    GENERAL: Sitting comfortably.   HEENT: Pupils are equal. Oropharynx is clear.   NECK: No cervical or supraclavicular lymphadenopathy.   LUNGS: Clear bilaterally.   HEART: S1, S2, regular.   ABDOMEN: Soft, nontender, nondistended, no hepatosplenomegaly.   EXTREMITIES: Warm, well perfused.   NEUROLOGIC: Alert, awake.   SKIN: No rash.   LYMPHATICS: No edema.      LABORATORY DATA:   Recent Labs   Lab Test  12/18/16   0559  12/17/16   1007   NA  137  129*   POTASSIUM  4.0  4.7   CHLORIDE  105  95   CO2  25  20   ANIONGAP  7  14   BUN  37*  46*   CR  1.12  1.22   GLC  127*  522*   MICHELLE  9.7  10.3*     Recent Labs   Lab Test  01/04/17   1316  12/28/16   1058  12/21/16   1100   WBC  2.4*  2.3*  1.7*   HGB  7.9*  8.6*  7.5*   PLT  39*  42*  57*   MCV  102*  102*  103*   NEUTROPHIL  76.2  71.9  68.9     Recent Labs   Lab Test  12/17/16   1007  12/15/16   1305  11/30/16   1410  11/17/16   0938   BILITOTAL   0.5  0.4  0.5   --    ALKPHOS  93  95  91   --    ALT  19  16  16   --    AST  10  10  9   --    ALBUMIN  2.7*  2.7*  2.5*   --    LDH   --    --    --   110      ECOG PS: 1    ASSESSMENT:  This is a 89-year-old gentleman who has kappa light chain multiple myeloma with hypercellular marrow with 65% of cells are light chain restricted plasma cells.  He has severe pancytopenia and has needed a transfusion in the hospital.  He does not have hypercalcemia and his renal function was normal.      He has a background of myelodysplastic syndrome on his bone marrow biopsy; however, majority of the cell population was monoclonal plasma cell population and I think that his cytopenias are primarily related to multiple myeloma.  Today we had a detailed lengthy discussion about the disease process and further plan of care.  At this point we will proceed with further staging workup which will include CBC, CMP, serum protein electrophoresis and immunofixation and light chain assay.  I will also have him collect a 24-hour urine for urine protein electrophoresis, urine immunofixation and urine light chain assay.  Will also get a bone  survey done.  The patient mentioned that he will need some time to think about before making a decision to initiate the treatment.      He is on weekly Velcade with dexamethasone 20 mg every week with every week labs and transfusion support at this time.     He is tolerating treatment well.     He needs adjustment of medication for diabetes due to dexamethasone related hyperglycemia, I will send a message to Dr. Reich      PLAN:   1.  Continue Velcade and dexamethasone. Velcade today.  2.  CBC, diff weekly- transfuse to keep hemoglobin of 8 and platelet of 10,000.   3.  RTC MD 2 weeks    ALISON JON MD    1/4/2017

## 2017-01-04 NOTE — PATIENT INSTRUCTIONS
1.  Continue Velcade and dexamethasone. Velcade today.  2.  CBC, diff, CMP weekly- transfuse to keep hemoglobin of 8 and platelet of 10,000.   3.  RTC MD 2 weeks

## 2017-01-04 NOTE — PROGRESS NOTES
"Roc Parkinson is a 89 year old male who presents for:  Chief Complaint   Patient presents with     Oncology Clinic Visit     Ret Multiple Myeloma        Initial Vitals:  /66 mmHg  Pulse 80  Temp(Src) 97.1  F (36.2  C) (Oral)  Resp 16 Estimated body mass index is 27.20 kg/(m^2) as calculated from the following:    Height as of 12/28/16: 1.778 m (5' 10\").    Weight as of 12/28/16: 86.002 kg (189 lb 9.6 oz).. There is no height or weight on file to calculate BSA. BP completed using cuff size: regular  Data Unavailable No LMP for male patient. Allergies and medications reviewed.     Medications: Medication refills  needed today.  Pharmacy name entered into [employer]:    [facility] [address] [place] [address] [address] [facility] [address] [place] [address] PHARMACY [place], [place] - [address]    Comments: Needs Decadron if he is to continue taking     5 minutes for nursing intake (face to face time)   Massiel Sanchez CMA    DISCHARGE PLAN:  Next appointments: See patient instruction section-- Lavinia  Departure Mode: Ambulatory  Accompanied by: Self  5 minutes for nursing discharge (face to face time)   Bere Pace RN    1.  Continue Velcade and dexamethasone. Velcade today.  2.  CBC, diff, CMP weekly- transfuse to keep hemoglobin of 8 and platelet of 10,000.   3.  RTC MD 2 weeks     S 1/11/2017 Wed  11:00 A 60 [facility] [[id number]] NORTH CHAIR 10 [[id number]] LEVEL 1 [663] Velcade/Zometa C2D1      S 1/18/2017 Wed  10:00 A 60 [facility] [[id number]] NORTH CHAIR 10 [[id number]] LEVEL 1 [663] Velcade C2D8       1/18/2017 Wed  10:30 A 15 [facility] [[id number]] ALISON JON [[id number]] RETURN [657] Return Multiple Myeloma      S 1/25/2017 Wed  10:30 A 60 [facility] [[id number]] NORTH CHAIR 7 [[id number]] LEVEL 1 [663] Velcade C2D15      S 2/1/2017 Wed  11:00 A 60 [facility] [[id number]] NORTH CHAIR 5 [[id number]] LEVEL 1 [663] Velcade C2D22       2/1/2017 Wed  11:30 A 15 [facility] [[id number]] ALISON JON [[id number]] RETURN [657] Return Multiple " Myeloma      S 2/8/2017 Wed   1:00 P 60 [facility] [[id number]] [facility] 10 [[id number]] LEVEL 1 [663] Pete Armenta C3D1

## 2017-01-04 NOTE — MR AVS SNAPSHOT
After Visit Summary   1/4/2017    Roc Parkinson    MRN: [id number]           Patient Information     Date Of Birth          7/7/1926        Visit Information        Provider Department      1/4/2017 1:30 PM SOUTH CHAIR 3 [facility]        Today's Diagnoses     Multiple myeloma not having achieved remission (H)    -  1     Macrocytic anemia            Follow-ups after your visit        Your next 10 appointments already scheduled     Jan 11, 2017 11:00 AM   Level 1 with NORTH CHAIR 10   [facility] ([facility])    [facility]  [address]  [place] [address]   [phone]            Jan 18, 2017 10:00 AM   Level 1 with NORTH CHAIR 10   [facility] ([facility])    [facility]  [address]  [place] [address]   [phone]            Jan 18, 2017 10:30 AM   Return Visit with Gretel Quinn MD   [facility] ([facility])    [facility] [place]  [address]  [place] [address]   [phone]            Jan 25, 2017 10:30 AM   Level 1 with NORTH CHAIR 7   [facility] ([facility])    [facility]  [address]  [place] [address]   [phone]            Feb 01, 2017 11:00 AM   Level 1 with NORTH CHAIR 5   [facility] ([facility])    [facility]  [address]  [place] [address]   [phone]            Feb 01, 2017 11:30 AM   Return Visit with Gretel Quinn MD   [facility] ([facility])    [facility]  [address]  [place] [address]   [phone]            Feb 08, 2017  1:00 PM   Level 1 with NORTH CHAIR 10   [facility]  "and Infusion Center ([facility])    [facility] [place]  [address]  [place] [id number] [phone]              Future tests that were ordered for you today     Open Standing Orders        Priority Remaining Interval Expires Ordered    Comprehensive metabolic panel Routine 50/50  2018    Red blood cell prepare order unit Routine 99/100 CONDITIONAL (SPECIFY) BLOOD  2017    Transfuse red blood cell unit Routine 99/100 TRANSFUSE 1 UNIT  2017            Who to contact     If you have questions or need follow up information about today's clinic visit or your schedule please contact [facility] directly at [phone].  Normal or non-critical lab and imaging results will be communicated to you by [employer], letter or phone within 4 business days after the clinic has received the results. If you do not hear from us within 7 days, please contact the clinic through [employer] or phone. If you have a critical or abnormal lab result, we will notify you by phone as soon as possible.  Submit refill requests through [employer] or call your pharmacy and they will forward the refill request to us. Please allow 3 business days for your refill to be completed.          Additional Information About Your Visit        Groups[employer] Information     [employer] lets you send messages to your doctor, view your test results, renew your prescriptions, schedule appointments and more. To sign up, go to [email or web address] . Click on \"Log in\" on the left side of the screen, which will take you to the Welcome page. Then click on \"Sign up Now\" on the right side of the page.     You will be asked to enter the access code listed below, as well as some personal information. Please follow the directions to create your username and password.     Your access code is: [email or web address]  Expires: 3/28/2017 12:15 PM     Your access code will  in 90 days. If you need " help or a new code, please call your [place] clinic or [phone].        Care EveryWhere ID     This is your Care EveryWhere ID. This could be used by other organizations to access your [place] medical records  [id number]        Your Vitals Were     Pulse Temperature Respirations             72 97.8  F (36.6  C) (Oral) 16          Blood Pressure from Last 3 Encounters:   01/04/17 136/66   01/04/17 158/85   12/28/16 154/75    Weight from Last 3 Encounters:   12/28/16 86.002 kg (189 lb 9.6 oz)   12/21/16 85.639 kg (188 lb 12.8 oz)   12/17/16 84.7 kg (186 lb 11.7 oz)              We Performed the Following     ABO/Rh type and screen     Blood component     CBC with platelets differential     Red blood cell prepare order unit     Transfuse red blood cell unit     Treatment Conditions          Today's Medication Changes          These changes are accurate as of: 1/4/17  4:50 PM.  If you have any questions, ask your nurse or doctor.               These medicines have changed or have updated prescriptions.        Dose/Directions    dexamethasone 4 MG tablet   Commonly known as:  DECADRON   This may have changed:    - how much to take  - how to take this  - when to take this  - additional instructions   Used for:  Multiple myeloma not having achieved remission (H)        Take 20 mg weekly   Quantity:  20 tablet   Refills:  0            Where to get your medicines      These medications were sent to [facility]al - [address]  [address] [place] [address], [place] [address]     Phone:  [phone]    - acyclovir 400 MG tablet             Primary Care Provider Office Phone # Fax #    Dickson Reich [phone]       [facility] [address]  [facility] [address]        Thank you!     Thank you for choosing [facility]  for your care. Our goal is always to provide you with excellent care. Hearing back from our  patients is one way we can continue to improve our services. Please take a few minutes to complete the written survey that you may receive in the mail after your visit with us. Thank you!             Your Updated Medication List - Protect others around you: Learn how to safely use, store and throw away your medicines at www.disposemymeds.org.          This list is accurate as of: 1/4/17  4:50 PM.  Always use your most recent med list.                   Brand Name Dispense Instructions for use    acyclovir 400 MG tablet    ZOVIRAX    60 tablet    Take 1 tablet (400 mg) by mouth 2 times daily Viral Prophylaxis.       amLODIPine 5 MG tablet    NORVASC    30 tablet    Take 1 tablet (5 mg) by mouth daily       dexamethasone 4 MG tablet    DECADRON    20 tablet    Take 20 mg weekly       ferrous sulfate 325 (65 FE) MG tablet    IRON     Take 325 mg by mouth daily (with breakfast)       FREESTYLE LITE test strip   Generic drug:  blood glucose monitoring     200 each    USE TO TEST BLOOD GLUCOSE TWICE DAILY       gemfibrozil 600 MG tablet    LOPID    180 tablet    TAKE 1 TABLET BY MOUTH TWICE DAILY       glipiZIDE 10 MG tablet    GLUCOTROL    180 tablet    TAKE 1 TABLET BY MOUTH TWICE DAILY BEFORE A MEAL       * insulin aspart 100 UNIT/ML injection    NovoLOG PEN    9 mL    Inject 1-7 Units Subcutaneous 3 times daily (before meals)       * insulin aspart 100 UNIT/ML injection    NovoLOG PEN     Inject 1-5 Units Subcutaneous At Bedtime       levothyroxine 25 MCG tablet    SYNTHROID/LEVOTHROID    90 tablet    TAKE 1 TABLET BY MOUTH EVERY DAY       lisinopril 30 MG tablet    PRINIVIL,ZESTRIL    90 tablet    TAKE 1 TABLET BY MOUTH EVERY DAY       metoprolol 25 MG tablet    LOPRESSOR    180 tablet    TAKE 1 TABLET BY MOUTH TWICE DAILY       omeprazole 20 MG CR capsule    priLOSEC    90 capsule    TAKE 1 CAPSULE BY MOUTH EVERY DAY       ondansetron 8 MG tablet    ZOFRAN    10 tablet    Take 1 tablet (8 mg) by mouth every 8 hours  as needed (Nausea/Vomiting)       polyethylene glycol Packet    MIRALAX/GLYCOLAX     Take 17 g by mouth daily       prochlorperazine 5 MG tablet    COMPAZINE    30 tablet    Take 1 tablet (5 mg) by mouth every 6 hours as needed (Nausea/Vomiting)       Selenium 200 MCG Tabs      Take 100 mcg by mouth daily. Pt takes one half tab of the 200 mcg daily       tamsulosin 0.4 MG capsule    FLOMAX    90 capsule    TAKE 1 CAPSULE BY MOUTH DAILY       VITAMIN C PO      Take 1,000 mg by mouth daily       VITAMIN D3 PO      Take 1,000 Units by mouth daily       * Notice:  This list has 2 medication(s) that are the same as other medications prescribed for you. Read the directions carefully, and ask your doctor or other care provider to review them with you.

## 2017-01-06 ENCOUNTER — OFFICE VISIT (OUTPATIENT)
Dept: UROLOGY | Facility: CLINIC | Age: 82
End: 2017-01-06
Payer: COMMERCIAL

## 2017-01-06 ENCOUNTER — TELEPHONE (OUTPATIENT)
Dept: FAMILY MEDICINE | Facility: CLINIC | Age: 82
End: 2017-01-06

## 2017-01-06 VITALS
HEIGHT: 70 IN | WEIGHT: 185 LBS | SYSTOLIC BLOOD PRESSURE: 122 MMHG | BODY MASS INDEX: 26.48 KG/M2 | HEART RATE: 64 BPM | DIASTOLIC BLOOD PRESSURE: 64 MMHG

## 2017-01-06 DIAGNOSIS — N48.1 BALANITIS: Primary | ICD-10-CM

## 2017-01-06 DIAGNOSIS — N30.00 ACUTE CYSTITIS WITHOUT HEMATURIA: Primary | ICD-10-CM

## 2017-01-06 DIAGNOSIS — R23.4 FISSURE OF SKIN: ICD-10-CM

## 2017-01-06 DIAGNOSIS — N47.1 PHIMOSIS: ICD-10-CM

## 2017-01-06 LAB
ALBUMIN UR-MCNC: NEGATIVE MG/DL
APPEARANCE UR: CLEAR
BILIRUB UR QL STRIP: NEGATIVE
COLOR UR AUTO: YELLOW
GLUCOSE UR STRIP-MCNC: >=1000 MG/DL
HGB UR QL STRIP: ABNORMAL
KETONES UR STRIP-MCNC: NEGATIVE MG/DL
LEUKOCYTE ESTERASE UR QL STRIP: NEGATIVE
NITRATE UR QL: NEGATIVE
PH UR STRIP: 5.5 PH (ref 5–7)
SP GR UR STRIP: 1.01 (ref 1–1.03)
URN SPEC COLLECT METH UR: ABNORMAL
UROBILINOGEN UR STRIP-ACNC: 0.2 EU/DL (ref 0.2–1)

## 2017-01-06 PROCEDURE — 99213 OFFICE O/P EST LOW 20 MIN: CPT | Performed by: PHYSICIAN ASSISTANT

## 2017-01-06 PROCEDURE — 81003 URINALYSIS AUTO W/O SCOPE: CPT | Performed by: PHYSICIAN ASSISTANT

## 2017-01-06 RX ORDER — CLOTRIMAZOLE AND BETAMETHASONE DIPROPIONATE 10; .64 MG/G; MG/G
CREAM TOPICAL
Qty: 30 G | Refills: 1 | Status: SHIPPED | OUTPATIENT
Start: 2017-01-06 | End: 2017-01-01

## 2017-01-06 NOTE — TELEPHONE ENCOUNTER
Reason for Call:  Form, our goal is to have forms completed with 72 hours, however, some forms may require a visit or additional information.    Type of letter, form or note:  [facility]    Who is the form from?: [facility] (if other please explain)    Where did the form come from: form was faxed in    What clinic location was the form placed at?: [facility]    Where the form was placed: Dr's Box: Dickson Reich MD    What number is listed as a contact on the form?: [facility], fax # [phone]       Additional comments: Signature on verbal order given for Skilled Nursing on 01-    Call taken on 1/6/2017 at 11:21 AM by PREMA PETERS

## 2017-01-06 NOTE — Clinical Note
1/6/2017       RE: Roc Parkinson  [address]   [facility] [address]     Dear Colleague,    Thank you for referring your patient, Roc Parkinson, to the [facility] [place] at [facility]. Please see a copy of my visit note below.    Reason for visit: head of penis bleeding    HPI: Mr. Roc Parkinson is a very pleasant 89 year old male with a history of bladder cancer and prostate cancer, as well as just recently diagnosed multiple myeloma, who returns to the urology clinic today for bleeding around the head of the penis. He first noticed this a few days ago and believes it may have occurred after he tried to stretch the foreskin back and some dry skin cracked and bled. This self-resolved after several minutes. He then applied some lotion to the head of the penis and he has not experienced this bleeding since then. He denies gross hematuria, dysuria, frequency, urgency, penile discharge, fevers or chills. Does state that the foreskin has been constricted for many years to the point where he can not retract it back over the head of the penis. 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 Wears Depends for this purpose and goes through 3-4 per day.     He was previously followed by Dr. Godfrey of this office for his bladder and prostate cancers with annual urine cytology, PSA, and MANI. The patient had requested no additional cystoscopic evaluation (please see my progress note from 11/29/16 for more complete history). The patient tells me today that the topic of circumcision had come up in the past but Dr. Godfrey had recommended against it as the patient is a poor surgical candidate. I recently saw the patient on 11/29/16 at which time his urine cytology returned negative, PSA was low at 0.10, and MANI was unremarkable. Plan was to follow up in 1 year with  "the same.    PHYSICAL EXAM:    /64 mmHg  Pulse 64  Ht 1.778 m (5' 10\")  Wt 83.915 kg (185 lb)  BMI 26.54 kg/m2  GENERAL: Well groomed, well developed, well nourished male in NAD.  HEENT: EOMI, AT, NC.  SKIN: Warm to touch, dry.  No visible rashes or lesions on examined areas.  RESP: No increased respiratory effort.    MS: Full ROM in extremities.  : Uncircumcised penis. Phimosis present - I am unable to retract the foreskin to see any portion of the glans. There is a small, vertical fissure on the ventral foreskin which is scabbed over. No active bleeding. Continuous urine leak evident. Changes to the penile skin (mildly macerated and erythematous) c/w chronic exposure to urine. Scrotum normal. Testicles descended bilaterally.  MANI: Deferred.  NEURO: Alert and oriented x 3.  PSYCH: Normal mood and affect, pleasant and agreeable during interview and exam.    IMAGING: Reviewed abdominal ultrasound from 11/13/16 which revealed normal kidneys with no evidence for hydronephrosis..    LABORATORY:  UA today with >1000 glucose, trace blood, o/w wnl.    ASSESSMENT/PLAN:  Mr. Roc Parkinson is a very pleasant 89 year old male with a history of prostate cancer s/p EBRT s/p brachytherapy in 2003 (recent PSA 0.10), bladder cancer (no recurrences since 1986, recent negative urine cytology), urethral stricture disease (bulbous, previously managed with dilatations) with continuous urinary incontinence, as well as recently diagnosed multiple myeloma, and 1 recent episode of transient bleeding from the penile head. Suspect this was likely 2/2 small fissure on the ventral foreksin which is now scabbed over. He does have a severe phimosis and characteristic changes to the penile skin c/w chronic exposure to urine. As a result, he is predisposed to forming these small fissures and having intermittent bleeding from this area. Importantly, his warfarin was recently discontinued so there is less concern for prolonged " hemorrhaging from these small fissures.    As the patient is a poor surgical candidate and does not wish for any more surgical procedures, we discussed conservative management of phimosis and appropriate skin care/hygiene including:  -Gentle stretching and cleaning of the foreskin once-twice daily. Keep the area as dry as possible by changing wet briefs frequently.  - Rx for Lotrisone cream provided which he may use BID for up to 2 weeks at a time at onset of balanitis symptoms.     Follow up 1 year with PSA, MANI, and urine cytology.  Call or RTC sooner with any issues.    I have enjoyed participating in the medical care of this very pleasant patient.  Please don't hesitate to contact me with any questions or concerns.      Agustina Mendoza PA-C  Urology

## 2017-01-08 NOTE — PROGRESS NOTES
"Reason for visit: head of penis bleeding    HPI: Mr. Roc Parkinson is a very pleasant 89 year old male with a history of bladder cancer and prostate cancer, as well as just recently diagnosed multiple myeloma, who returns to the urology clinic today for bleeding around the head of the penis. He first noticed this a few days ago and believes it may have occurred after he tried to stretch the foreskin back and some dry skin cracked and bled. This self-resolved after several minutes. He then applied some lotion to the head of the penis and he has not experienced this bleeding since then. He denies gross hematuria, dysuria, frequency, urgency, penile discharge, fevers or chills. Does state that the foreskin has been constricted for many years to the point where he can not retract it back over the head of the penis. 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 Wears Depends for this purpose and goes through 3-4 per day.     He was previously followed by Dr. Godfrey of this office for his bladder and prostate cancers with annual urine cytology, PSA, and MANI. The patient had requested no additional cystoscopic evaluation (please see my progress note from 11/29/16 for more complete history). The patient tells me today that the topic of circumcision had come up in the past but Dr. Godfrey had recommended against it as the patient is a poor surgical candidate. I recently saw the patient on 11/29/16 at which time his urine cytology returned negative, PSA was low at 0.10, and MANI was unremarkable. Plan was to follow up in 1 year with the same.    PHYSICAL EXAM:    /64 mmHg  Pulse 64  Ht 1.778 m (5' 10\")  Wt 83.915 kg (185 lb)  BMI 26.54 kg/m2  GENERAL: Well groomed, well developed, well nourished male in NAD.  HEENT: EOMI, AT, NC.  SKIN: Warm to touch, dry.  No visible rashes or lesions on examined areas.  RESP: No increased respiratory effort. "    MS: Full ROM in extremities.  : Uncircumcised penis. Phimosis present - I am unable to retract the foreskin to see any portion of the glans. There is a small, vertical fissure on the ventral foreskin which is scabbed over. No active bleeding. Continuous urine leak evident. Changes to the penile skin (mildly macerated and erythematous) c/w chronic exposure to urine. Scrotum normal. Testicles descended bilaterally.  MANI: Deferred.  NEURO: Alert and oriented x 3.  PSYCH: Normal mood and affect, pleasant and agreeable during interview and exam.    IMAGING: Reviewed abdominal ultrasound from 11/13/16 which revealed normal kidneys with no evidence for hydronephrosis..    LABORATORY:  UA today with >1000 glucose, trace blood, o/w wnl.    ASSESSMENT/PLAN:  Mr. Roc Parkinson is a very pleasant 89 year old male with a history of prostate cancer s/p EBRT s/p brachytherapy in 2003 (recent PSA 0.10), bladder cancer (no recurrences since 1986, recent negative urine cytology), urethral stricture disease (bulbous, previously managed with dilatations) with continuous urinary incontinence, as well as recently diagnosed multiple myeloma, and 1 recent episode of transient bleeding from the penile head. Suspect this was likely 2/2 small fissure on the ventral foreksin which is now scabbed over. He does have a severe phimosis and characteristic changes to the penile skin c/w chronic exposure to urine. As a result, he is predisposed to forming these small fissures and having intermittent bleeding from this area. Importantly, his warfarin was recently discontinued so there is less concern for prolonged hemorrhaging from these small fissures.    As the patient is a poor surgical candidate and does not wish for any more surgical procedures, we discussed conservative management of phimosis and appropriate skin care/hygiene including:  -Gentle stretching and cleaning of the foreskin once-twice daily. Keep the area as dry as possible  by changing wet briefs frequently.  - Rx for Lotrisone cream provided which he may use BID for up to 2 weeks at a time at onset of balanitis symptoms.     Follow up 1 year with PSA, MANI, and urine cytology.  Call or RTC sooner with any issues.    I have enjoyed participating in the medical care of this very pleasant patient.  Please don't hesitate to contact me with any questions or concerns.      Agustina Mendoza PA-C  Urology

## 2017-01-09 NOTE — TELEPHONE ENCOUNTER
Form reviewed and signed by Dr. Dickson Reich and faxed to [facility].  Rosalva Malloy TC

## 2017-01-10 ENCOUNTER — TELEPHONE (OUTPATIENT)
Dept: FAMILY MEDICINE | Facility: CLINIC | Age: 82
End: 2017-01-10

## 2017-01-10 DIAGNOSIS — C90.00 MULTIPLE MYELOMA NOT HAVING ACHIEVED REMISSION (H): Primary | ICD-10-CM

## 2017-01-10 RX ORDER — DEXAMETHASONE 4 MG/1
TABLET ORAL
Qty: 20 TABLET | Refills: 0 | Status: SHIPPED | OUTPATIENT
Start: 2017-01-10 | End: 2017-02-15

## 2017-01-10 NOTE — TELEPHONE ENCOUNTER
Prescription called in for Nystatin Suspension 5 ml four times daily/prn Swish and swallow 240 cc.

## 2017-01-10 NOTE — TELEPHONE ENCOUNTER
Patient has mouth sores and a sore throat possibly related to chemo. Would you like to prescribe a mouth wash or something? Per his endocrinologist is his glipizide dosage going to be increased.

## 2017-01-10 NOTE — TELEPHONE ENCOUNTER
Reason for Call:  Other patient still having low B/P and light headedness.  Didn't discharge last week B/P today 110/64    Detailed comments: Agustina from home care called     Phone Number Patient can be reached at: Other phone number:  Agustina     Best Time: any    Can we leave a detailed message on this number? YES    Call taken on 1/10/2017 at 1:59 PM by TEREZA OLIVER

## 2017-01-11 ENCOUNTER — HOSPITAL ENCOUNTER (OUTPATIENT)
Facility: CLINIC | Age: 82
Setting detail: SPECIMEN
Discharge: HOME | End: 2017-01-11
Attending: INTERNAL MEDICINE | Admitting: INTERNAL MEDICINE
Payer: MEDICARE

## 2017-01-11 ENCOUNTER — CARE COORDINATION (OUTPATIENT)
Dept: ONCOLOGY | Facility: CLINIC | Age: 82
End: 2017-01-11

## 2017-01-11 ENCOUNTER — INFUSION THERAPY VISIT (OUTPATIENT)
Dept: INFUSION THERAPY | Facility: CLINIC | Age: 82
End: 2017-01-11
Attending: INTERNAL MEDICINE
Payer: MEDICARE

## 2017-01-11 VITALS
WEIGHT: 191.6 LBS | BODY MASS INDEX: 27.43 KG/M2 | DIASTOLIC BLOOD PRESSURE: 86 MMHG | HEART RATE: 76 BPM | SYSTOLIC BLOOD PRESSURE: 170 MMHG | RESPIRATION RATE: 16 BRPM | TEMPERATURE: 97.8 F | OXYGEN SATURATION: 99 % | HEIGHT: 70 IN

## 2017-01-11 DIAGNOSIS — D53.9 MACROCYTIC ANEMIA: ICD-10-CM

## 2017-01-11 DIAGNOSIS — C90.00 MULTIPLE MYELOMA NOT HAVING ACHIEVED REMISSION (H): Primary | ICD-10-CM

## 2017-01-11 LAB
ABO + RH BLD: NORMAL
ABO + RH BLD: NORMAL
ALBUMIN SERPL-MCNC: 2.6 G/DL (ref 3.4–5)
ALP SERPL-CCNC: 73 U/L (ref 40–150)
ALT SERPL W P-5'-P-CCNC: 28 U/L (ref 0–70)
ANION GAP SERPL CALCULATED.3IONS-SCNC: 9 MMOL/L (ref 3–14)
AST SERPL W P-5'-P-CCNC: 22 U/L (ref 0–45)
BASOPHILS # BLD AUTO: 0 10E9/L (ref 0–0.2)
BASOPHILS NFR BLD AUTO: 0 %
BILIRUB SERPL-MCNC: 0.4 MG/DL (ref 0.2–1.3)
BLD GP AB SCN SERPL QL: NORMAL
BLD PROD TYP BPU: NORMAL
BLD UNIT ID BPU: 0
BLD UNIT ID BPU: 0
BLOOD BANK CMNT PATIENT-IMP: NORMAL
BLOOD PRODUCT CODE: NORMAL
BLOOD PRODUCT CODE: NORMAL
BPU ID: NORMAL
BPU ID: NORMAL
BUN SERPL-MCNC: 38 MG/DL (ref 7–30)
CALCIUM SERPL-MCNC: 9.6 MG/DL (ref 8.5–10.1)
CHLORIDE SERPL-SCNC: 97 MMOL/L (ref 94–109)
CO2 SERPL-SCNC: 22 MMOL/L (ref 20–32)
CREAT SERPL-MCNC: 1.24 MG/DL (ref 0.66–1.25)
DIFFERENTIAL METHOD BLD: ABNORMAL
EOSINOPHIL # BLD AUTO: 0 10E9/L (ref 0–0.7)
EOSINOPHIL NFR BLD AUTO: 0 %
ERYTHROCYTE [DISTWIDTH] IN BLOOD BY AUTOMATED COUNT: 21.9 % (ref 10–15)
GFR SERPL CREATININE-BSD FRML MDRD: 55 ML/MIN/1.7M2
GLUCOSE SERPL-MCNC: 384 MG/DL (ref 70–99)
HCT VFR BLD AUTO: 21.7 % (ref 40–53)
HGB BLD-MCNC: 7.5 G/DL (ref 13.3–17.7)
IMM GRANULOCYTES # BLD: 0 10E9/L (ref 0–0.4)
IMM GRANULOCYTES NFR BLD: 0.4 %
LYMPHOCYTES # BLD AUTO: 0.7 10E9/L (ref 0.8–5.3)
LYMPHOCYTES NFR BLD AUTO: 27.9 %
MCH RBC QN AUTO: 34.2 PG (ref 26.5–33)
MCHC RBC AUTO-ENTMCNC: 34.6 G/DL (ref 31.5–36.5)
MCV RBC AUTO: 99 FL (ref 78–100)
MONOCYTES # BLD AUTO: 0.1 10E9/L (ref 0–1.3)
MONOCYTES NFR BLD AUTO: 2.6 %
NEUTROPHILS # BLD AUTO: 1.6 10E9/L (ref 1.6–8.3)
NEUTROPHILS NFR BLD AUTO: 69.1 %
NRBC # BLD AUTO: 0 10*3/UL
NRBC BLD AUTO-RTO: 0 /100
NUM BPU REQUESTED: 1
NUM BPU REQUESTED: 1
PLATELET # BLD AUTO: 32 10E9/L (ref 150–450)
POTASSIUM SERPL-SCNC: 5.5 MMOL/L (ref 3.4–5.3)
PROT SERPL-MCNC: 10.3 G/DL (ref 6.8–8.8)
RBC # BLD AUTO: 2.19 10E12/L (ref 4.4–5.9)
SODIUM SERPL-SCNC: 128 MMOL/L (ref 133–144)
SPECIMEN EXP DATE BLD: NORMAL
TRANSFUSION STATUS PATIENT QL: NORMAL
WBC # BLD AUTO: 2.3 10E9/L (ref 4–11)

## 2017-01-11 PROCEDURE — 36430 TRANSFUSION BLD/BLD COMPNT: CPT

## 2017-01-11 PROCEDURE — 86900 BLOOD TYPING SEROLOGIC ABO: CPT | Performed by: INTERNAL MEDICINE

## 2017-01-11 PROCEDURE — P9016 RBC LEUKOCYTES REDUCED: HCPCS | Performed by: INTERNAL MEDICINE

## 2017-01-11 PROCEDURE — 86923 COMPATIBILITY TEST ELECTRIC: CPT | Performed by: INTERNAL MEDICINE

## 2017-01-11 PROCEDURE — P9037 PLATE PHERES LEUKOREDU IRRAD: HCPCS | Performed by: INTERNAL MEDICINE

## 2017-01-11 PROCEDURE — 85025 COMPLETE CBC W/AUTO DIFF WBC: CPT | Performed by: INTERNAL MEDICINE

## 2017-01-11 PROCEDURE — 86901 BLOOD TYPING SEROLOGIC RH(D): CPT | Performed by: INTERNAL MEDICINE

## 2017-01-11 PROCEDURE — 86850 RBC ANTIBODY SCREEN: CPT | Performed by: INTERNAL MEDICINE

## 2017-01-11 PROCEDURE — P9016 RBC LEUKOCYTES REDUCED: HCPCS | Mod: 59 | Performed by: INTERNAL MEDICINE

## 2017-01-11 PROCEDURE — 80053 COMPREHEN METABOLIC PANEL: CPT | Performed by: INTERNAL MEDICINE

## 2017-01-11 ASSESSMENT — PAIN SCALES - GENERAL: PAINLEVEL: NO PAIN (0)

## 2017-01-11 NOTE — PROGRESS NOTES
Patient came in today and asked if I would take a look at his gums they had been bleeding significantly during the night and this morning. I took a look at his mouth and I could see blood on the upper left jaw line. I spoke with the charge nurse Kia and he will have labs draw today and she will assess patient's mouth a little further. Meghan Melgar

## 2017-01-11 NOTE — PROGRESS NOTES
Infusion Nursing Note:  Roc Parkinson presents today for C2D1 velcade.    Patient seen by provider today: No    Note: Upper gums bleeding since last evening. 1 unit of platelets given today, per Dr. Villaseñor. Instructed patient to call the clinic of bleeding doesn't stop. He understood.  Zometa on hold until dental clearance received; dental appt scheduled for 1/19; added a note for possible zometa the following week.    Intravenous Access:  Peripheral IV placed.    Treatment Conditions:  HGB      7.5   1/11/2017  WBC      2.3   1/11/2017   ANEU      1.6   1/11/2017  PLT       32   1/11/2017     NA      128   1/11/2017                POTASSIUM      5.5   1/11/2017        No results found for this basename: mag         CR     1.24   1/11/2017                MICHELLE      9.6   1/11/2017             BILITOTAL      0.4   1/11/2017        ALBUMIN      2.6   1/11/2017                 ALT       28   1/11/2017        AST       22   1/11/2017  Results reviewed, labs did NOT meet treatment parameters: low platelet count - velcade held today  Blood transfusion consent signed 12/21/16.      Post Infusion Assessment:  Patient tolerated infusion without incident.  Site patent and intact, free from redness, edema or discomfort.  No evidence of extravasations.  Access discontinued per protocol.    Discharge Plan:   [facility]  Patient will return 1/18/17 for next appointment.   Patient discharged in stable condition accompanied by: brother.  Departure Mode: Ambulatory.    Alfa Morgan RN

## 2017-01-18 ENCOUNTER — INFUSION THERAPY VISIT (OUTPATIENT)
Dept: INFUSION THERAPY | Facility: CLINIC | Age: 82
End: 2017-01-18
Attending: INTERNAL MEDICINE
Payer: MEDICARE

## 2017-01-18 ENCOUNTER — HOSPITAL ENCOUNTER (OUTPATIENT)
Facility: CLINIC | Age: 82
Setting detail: SPECIMEN
Discharge: HOME | End: 2017-01-18
Attending: INTERNAL MEDICINE | Admitting: INTERNAL MEDICINE
Payer: MEDICARE

## 2017-01-18 ENCOUNTER — ONCOLOGY VISIT (OUTPATIENT)
Dept: ONCOLOGY | Facility: CLINIC | Age: 82
End: 2017-01-18
Attending: INTERNAL MEDICINE
Payer: MEDICARE

## 2017-01-18 VITALS
HEART RATE: 75 BPM | WEIGHT: 191 LBS | BODY MASS INDEX: 27.41 KG/M2 | RESPIRATION RATE: 14 BRPM | SYSTOLIC BLOOD PRESSURE: 132 MMHG | OXYGEN SATURATION: 95 % | DIASTOLIC BLOOD PRESSURE: 67 MMHG | TEMPERATURE: 97.5 F

## 2017-01-18 VITALS
RESPIRATION RATE: 14 BRPM | SYSTOLIC BLOOD PRESSURE: 149 MMHG | HEART RATE: 62 BPM | DIASTOLIC BLOOD PRESSURE: 77 MMHG | BODY MASS INDEX: 27.41 KG/M2 | WEIGHT: 191 LBS | OXYGEN SATURATION: 95 % | TEMPERATURE: 97.7 F

## 2017-01-18 DIAGNOSIS — D53.9 MACROCYTIC ANEMIA: ICD-10-CM

## 2017-01-18 DIAGNOSIS — C90.00 MULTIPLE MYELOMA NOT HAVING ACHIEVED REMISSION (H): Primary | ICD-10-CM

## 2017-01-18 LAB
ABO + RH BLD: NORMAL
ABO + RH BLD: NORMAL
ALBUMIN SERPL-MCNC: 2.6 G/DL (ref 3.4–5)
ALP SERPL-CCNC: 80 U/L (ref 40–150)
ALT SERPL W P-5'-P-CCNC: 24 U/L (ref 0–70)
ANION GAP SERPL CALCULATED.3IONS-SCNC: 11 MMOL/L (ref 3–14)
AST SERPL W P-5'-P-CCNC: 13 U/L (ref 0–45)
BASOPHILS # BLD AUTO: 0 10E9/L (ref 0–0.2)
BASOPHILS NFR BLD AUTO: 0 %
BILIRUB SERPL-MCNC: 0.6 MG/DL (ref 0.2–1.3)
BLD GP AB SCN SERPL QL: NORMAL
BLD PROD TYP BPU: NORMAL
BLD PROD TYP BPU: NORMAL
BLD UNIT ID BPU: 0
BLOOD BANK CMNT PATIENT-IMP: NORMAL
BLOOD PRODUCT CODE: NORMAL
BPU ID: NORMAL
BUN SERPL-MCNC: 39 MG/DL (ref 7–30)
CALCIUM SERPL-MCNC: 9.6 MG/DL (ref 8.5–10.1)
CHLORIDE SERPL-SCNC: 99 MMOL/L (ref 94–109)
CO2 SERPL-SCNC: 20 MMOL/L (ref 20–32)
CREAT SERPL-MCNC: 1.23 MG/DL (ref 0.66–1.25)
DIFFERENTIAL METHOD BLD: ABNORMAL
EOSINOPHIL # BLD AUTO: 0 10E9/L (ref 0–0.7)
EOSINOPHIL NFR BLD AUTO: 0.5 %
ERYTHROCYTE [DISTWIDTH] IN BLOOD BY AUTOMATED COUNT: 21.9 % (ref 10–15)
GFR SERPL CREATININE-BSD FRML MDRD: 55 ML/MIN/1.7M2
GLUCOSE SERPL-MCNC: 408 MG/DL (ref 70–99)
HCT VFR BLD AUTO: 22.5 % (ref 40–53)
HGB BLD-MCNC: 7.8 G/DL (ref 13.3–17.7)
IMM GRANULOCYTES # BLD: 0 10E9/L (ref 0–0.4)
IMM GRANULOCYTES NFR BLD: 0 %
LYMPHOCYTES # BLD AUTO: 0.4 10E9/L (ref 0.8–5.3)
LYMPHOCYTES NFR BLD AUTO: 18.6 %
MCH RBC QN AUTO: 34.4 PG (ref 26.5–33)
MCHC RBC AUTO-ENTMCNC: 34.7 G/DL (ref 31.5–36.5)
MCV RBC AUTO: 99 FL (ref 78–100)
MONOCYTES # BLD AUTO: 0.1 10E9/L (ref 0–1.3)
MONOCYTES NFR BLD AUTO: 3.3 %
NEUTROPHILS # BLD AUTO: 1.6 10E9/L (ref 1.6–8.3)
NEUTROPHILS NFR BLD AUTO: 77.6 %
NRBC # BLD AUTO: 0 10*3/UL
NRBC BLD AUTO-RTO: 0 /100
NUM BPU REQUESTED: 1
PLATELET # BLD AUTO: 59 10E9/L (ref 150–450)
POTASSIUM SERPL-SCNC: 5.3 MMOL/L (ref 3.4–5.3)
PROT SERPL-MCNC: 10.4 G/DL (ref 6.8–8.8)
RBC # BLD AUTO: 2.27 10E12/L (ref 4.4–5.9)
SODIUM SERPL-SCNC: 130 MMOL/L (ref 133–144)
SPECIMEN EXP DATE BLD: NORMAL
TRANSFUSION STATUS PATIENT QL: NORMAL
TRANSFUSION STATUS PATIENT QL: NORMAL
WBC # BLD AUTO: 2.1 10E9/L (ref 4–11)

## 2017-01-18 PROCEDURE — 86923 COMPATIBILITY TEST ELECTRIC: CPT | Performed by: INTERNAL MEDICINE

## 2017-01-18 PROCEDURE — 86850 RBC ANTIBODY SCREEN: CPT | Performed by: INTERNAL MEDICINE

## 2017-01-18 PROCEDURE — 99211 OFF/OP EST MAY X REQ PHY/QHP: CPT | Mod: 25

## 2017-01-18 PROCEDURE — 96401 CHEMO ANTI-NEOPL SQ/IM: CPT

## 2017-01-18 PROCEDURE — 25000128 H RX IP 250 OP 636: Mod: JW | Performed by: INTERNAL MEDICINE

## 2017-01-18 PROCEDURE — 80053 COMPREHEN METABOLIC PANEL: CPT | Performed by: INTERNAL MEDICINE

## 2017-01-18 PROCEDURE — 86901 BLOOD TYPING SEROLOGIC RH(D): CPT | Performed by: INTERNAL MEDICINE

## 2017-01-18 PROCEDURE — 86900 BLOOD TYPING SEROLOGIC ABO: CPT | Performed by: INTERNAL MEDICINE

## 2017-01-18 PROCEDURE — P9016 RBC LEUKOCYTES REDUCED: HCPCS | Performed by: INTERNAL MEDICINE

## 2017-01-18 PROCEDURE — 36430 TRANSFUSION BLD/BLD COMPNT: CPT

## 2017-01-18 PROCEDURE — 85025 COMPLETE CBC W/AUTO DIFF WBC: CPT | Performed by: INTERNAL MEDICINE

## 2017-01-18 PROCEDURE — 99214 OFFICE O/P EST MOD 30 MIN: CPT | Performed by: INTERNAL MEDICINE

## 2017-01-18 RX ADMIN — BORTEZOMIB 2.1 MG: 3.5 INJECTION, POWDER, LYOPHILIZED, FOR SOLUTION INTRAVENOUS; SUBCUTANEOUS at 12:34

## 2017-01-18 ASSESSMENT — PAIN SCALES - GENERAL
PAINLEVEL: NO PAIN (0)
PAINLEVEL: NO PAIN (0)

## 2017-01-18 NOTE — PATIENT INSTRUCTIONS
1.  Continue Velcade and dexamethasone. Velcade today.  2.  CBC, diff weekly- transfuse to keep hemoglobin of 8 and platelet of 10,000.   3.  RTC MD 2/15  4.  Labs on 2/8 SPEP, IFXN, light chains  5.  Continue Zometa monthly

## 2017-01-18 NOTE — PROGRESS NOTES
"Roc Parkinson is a 89 year old male who presents for:  Chief Complaint   Patient presents with     Oncology Clinic Visit     Ret Multiple Myeloma        Initial Vitals:  /67 mmHg  Pulse 75  Temp(Src) 97.5  F (36.4  C) (Oral)  Resp 14  Wt 86.637 kg (191 lb)  SpO2 95% Estimated body mass index is 27.41 kg/(m^2) as calculated from the following:    Height as of 1/11/17: 1.778 m (5' 10\").    Weight as of this encounter: 86.637 kg (191 lb).. There is no height on file to calculate BSA. BP completed using cuff size: regular  Data Unavailable No LMP for male patient. Allergies and medications reviewed.     Medications: Medication refills not needed today.  Pharmacy name entered into NUVETA:    [id number] - [facility] [address] AT [facility] [address] & [address] [place] [address] PHARMACY [place], [place] - [address]    Comments: None    6 minutes for nursing intake (face to face time)   Massiel Sanchez CMA    DISCHARGE PLAN:  Next appointments: See patient instruction section-- Ava  Departure Mode: Ambulatory  Accompanied by: RN to Infusion for Velcade with RN-to-RN report given to Dora ARGUETA.  6 minutes for nursing discharge (face to face time)   Bere Pace RN    1.  Continue Velcade and dexamethasone. Velcade today.  2.  CBC, diff weekly- transfuse to keep hemoglobin of 8 and platelet of 10,000.   3.  RTC MD 2/15  4.  Labs on 2/8 SPEP, IFXN, light chains  5.  Continue Zometa monthly--Pt did have dental clearance signed so he will have Zometa with his next Velcade--placed in HIMS to be scanned in.     S 1/18/2017 Wed 10:00 A 60 [facility] [[id number]] NORTH CHAIR 10 [[id number]] LEVEL 1 [663] Velcade C2D8 - CBC & Possiable Transfusion    S 1/25/2017 Wed  8:29 A 1 [facility][[id number]] ALISON JON[[id number]]       S 1/25/2017 Wed 10:30 A 60 [facility] [[id number]] NORTH CHAIR 7 [[id number]] LEVEL 1 [663] Velcade C2D8, Zometa/ possible transfusion    S 2/1/2017 Wed  11:00 [id number] [place] CHAIR 5 " [[id number]] LEVEL 1 [663] Velcade C2D15 possible transfusion      S 2/8/2017 Wed   1:00 P 60 [facility] [[id number]] NORTH CHAIR 10 [[id number]] LEVEL 1 [663] [id number] and all labs possible transfusion.       2/15/2017 Wed   9:00 A 240 [facility] [[id number]] NORTH CHAIR 4 [[id number]] LEVEL 4 [666] Labs, Velcade & Possible Transfusion - Dr Quinn at 945       2/15/2017 Wed   9:45 A 15 [facility] [[id number]] ALISON QUINN [[id number]] RETURN [657] Return Multiple Myeloma      S 2/22/2017 Wed  10:30 A 240 [facility] [[id number]] NORTH CHAIR 4 [[id number]] LEVEL 4 [666] Labs, [id number], Zometa & Possible Transfusion      S 3/1/2017 Wed  10:30 A 240 [facility] [[id number]] NORTH CHAIR 4 [[id number]] LEVEL 4 [666] Labs, Velcade & Possible Transfusion

## 2017-01-18 NOTE — MR AVS SNAPSHOT
After Visit Summary   1/18/2017    Roc Parkinson    MRN: [id number]           Patient Information     Date Of Birth          7/7/1926        Visit Information        Provider Department      1/18/2017 10:30 AM Gretel Quinn MD [facility]        Today's Diagnoses     Multiple myeloma not having achieved remission (H)    -  1       Care Instructions    1.  Continue Velcade and dexamethasone. Velcade today.  2.  CBC, diff weekly- transfuse to keep hemoglobin of 8 and platelet of 10,000.   3.  RTC MD 2/15  4.  Labs on 2/8 SPEP, IFXN, light chains  5.  Continue Zometa monthly        Follow-ups after your visit        Your next 10 appointments already scheduled     Jan 25, 2017 10:30 AM   Level 1 with NORTH CHAIR 7   [facility] ([facility])    [facility]  [address]  [place] [address]   [phone]            Feb 01, 2017 11:00 AM   Level 1 with NORTH CHAIR 5   [facility] ([facility])    [facility]  [address]  [place] [address]   [phone]            Feb 08, 2017  1:00 PM   Level 1 with NORTH CHAIR 10   [facility] ([facility])    [facility]  [address]  [place] [address]   [phone]            Feb 15, 2017  9:00 AM   Level 4 with NORTH CHAIR 4   [facility] ([facility])    [facility]  [address]  [place] [address]   [phone]            Feb 15, 2017  9:45 AM   Return Visit with Gretel Quinn MD   [facility] ([facility])    [facility]  [address]  [place] [address]   [phone]            Feb 22, 2017 10:30 AM   Level 4 with NORTH CHAIR 4   [facility]  "Center ([facility])    [facility] [place]  [address]  [place] [address]   [phone]            Mar 01, 2017 10:30 AM   Level 4 with NORTH CHAIR 4   [facility] ([facility])    [facility] [place]  [address]  [place] [address]   [phone]              Future tests that were ordered for you today     Open Standing Orders        Priority Remaining Interval Expires Ordered    Red blood cell prepare order unit Routine 99/100 CONDITIONAL (SPECIFY) BLOOD  1/18/2017    Transfuse red blood cell unit Routine 99/100 TRANSFUSE 1 UNIT  1/18/2017          Open Future Orders        Priority Expected Expires Ordered    Protein electrophoresis Routine 2/8/2017 10/18/2017 1/18/2017    Kappa and lambda light chain Routine 2/8/2017 10/18/2017 1/18/2017    Protein Immunofixation Serum Routine 2/8/2017 10/18/2017 1/18/2017            Who to contact     If you have questions or need follow up information about today's clinic visit or your schedule please contact [facility] directly at [phone].  Normal or non-critical lab and imaging results will be communicated to you by MyChart, letter or phone within 4 business days after the clinic has received the results. If you do not hear from us within 7 days, please contact the clinic through MyNewFinancialAdvisorhart or phone. If you have a critical or abnormal lab result, we will notify you by phone as soon as possible.  Submit refill requests through TrialPay or call your pharmacy and they will forward the refill request to us. Please allow 3 business days for your refill to be completed.          Additional Information About Your Visit        MyNewFinancialAdvisorharMyMusic Information     TrialPay lets you send messages to your doctor, view your test results, renew your prescriptions, schedule appointments and more. To sign up, go to [email or web address] . Click on \"Log in\" on the " "left side of the screen, which will take you to the Welcome page. Then click on \"Sign up Now\" on the right side of the page.     You will be asked to enter the access code listed below, as well as some personal information. Please follow the directions to create your username and password.     Your access code is: [email or web address]  Expires: 3/28/2017 12:15 PM     Your access code will  in 90 days. If you need help or a new code, please call your [facility] or [phone].        Care EveryWhere ID     This is your Care EveryWhere ID. This could be used by other organizations to access your [place] medical records  [id number]        Your Vitals Were     Pulse Temperature Respirations Pulse Oximetry          75 97.5  F (36.4  C) (Oral) 14 95%         Blood Pressure from Last 3 Encounters:   17 132/67   17 132/67   17 170/86    Weight from Last 3 Encounters:   17 86.637 kg (191 lb)   17 86.637 kg (191 lb)   17 86.909 kg (191 lb 9.6 oz)               Primary Care Provider Office Phone # Fax #    Dickson Reich [phone]       [facility] [address] [facility] [address]        Thank you!     Thank you for choosing [facility]  for your care. Our goal is always to provide you with excellent care. Hearing back from our patients is one way we can continue to improve our services. Please take a few minutes to complete the written survey that you may receive in the mail after your visit with us. Thank you!             Your Updated Medication List - Protect others around you: Learn how to safely use, store and throw away your medicines at www.disposemymeds.org.          This list is accurate as of: 17 12:29 PM.  Always use your most recent med list.                   Brand Name Dispense Instructions for use    acyclovir 400 MG tablet    ZOVIRAX    60 tablet    Take 1 tablet (400 mg) by mouth 2 times daily Viral " Prophylaxis.       amLODIPine 5 MG tablet    NORVASC    30 tablet    Take 1 tablet (5 mg) by mouth daily       clotrimazole-betamethasone cream    LOTRISONE    30 g    Apply topically to the head of the penis two times a day for up to 2 weeks.       dexamethasone 4 MG tablet    DECADRON    20 tablet    Take 20 mg weekly       ferrous sulfate 325 (65 FE) MG tablet    IRON     Take 325 mg by mouth daily (with breakfast)       FREESTYLE LITE test strip   Generic drug:  blood glucose monitoring     200 each    USE TO TEST BLOOD GLUCOSE TWICE DAILY       gemfibrozil 600 MG tablet    LOPID    180 tablet    TAKE 1 TABLET BY MOUTH TWICE DAILY       glipiZIDE 10 MG tablet    GLUCOTROL    180 tablet    TAKE 1 TABLET BY MOUTH TWICE DAILY BEFORE A MEAL       * insulin aspart 100 UNIT/ML injection    NovoLOG PEN    9 mL    Inject 1-7 Units Subcutaneous 3 times daily (before meals)       * insulin aspart 100 UNIT/ML injection    NovoLOG PEN     Inject 1-5 Units Subcutaneous At Bedtime       levothyroxine 25 MCG tablet    SYNTHROID/LEVOTHROID    90 tablet    TAKE 1 TABLET BY MOUTH EVERY DAY       lisinopril 30 MG tablet    PRINIVIL,ZESTRIL    90 tablet    TAKE 1 TABLET BY MOUTH EVERY DAY       metoprolol 25 MG tablet    LOPRESSOR    180 tablet    TAKE 1 TABLET BY MOUTH TWICE DAILY       omeprazole 20 MG CR capsule    priLOSEC    90 capsule    TAKE 1 CAPSULE BY MOUTH EVERY DAY       ondansetron 8 MG tablet    ZOFRAN    10 tablet    Take 1 tablet (8 mg) by mouth every 8 hours as needed (Nausea/Vomiting)       polyethylene glycol Packet    MIRALAX/GLYCOLAX     Take 17 g by mouth daily       prochlorperazine 5 MG tablet    COMPAZINE    30 tablet    Take 1 tablet (5 mg) by mouth every 6 hours as needed (Nausea/Vomiting)       Selenium 200 MCG Tabs      Take 100 mcg by mouth daily. Pt takes one half tab of the 200 mcg daily       tamsulosin 0.4 MG capsule    FLOMAX    90 capsule    TAKE 1 CAPSULE BY MOUTH DAILY       VITAMIN C PO       Take 1,000 mg by mouth daily       VITAMIN D3 PO      Take 1,000 Units by mouth daily       * Notice:  This list has 2 medication(s) that are the same as other medications prescribed for you. Read the directions carefully, and ask your doctor or other care provider to review them with you.

## 2017-01-18 NOTE — MR AVS SNAPSHOT
After Visit Summary   1/18/2017    Roc Parkinson    MRN: [id number]           Patient Information     Date Of Birth          7/7/1926        Visit Information        Provider Department      1/18/2017 10:00 AM NORTH CHAIR 10 [facility]        Today's Diagnoses     Multiple myeloma not having achieved remission (H)    -  1     Macrocytic anemia            Follow-ups after your visit        Your next 10 appointments already scheduled     Jan 25, 2017 10:30 AM   Level 1 with NORTH CHAIR 7   [facility] ([facility])    [facility] [place] [id number]  [place] [address]   [phone]            Feb 01, 2017 11:00 AM   Level 1 with NORTH CHAIR 5   [facility] ([facility])    [facility]  [address]  [place] [address]   [phone]            Feb 08, 2017  1:00 PM   Level 1 with NORTH CHAIR 10   [facility] ([facility])    [facility]  [address]  [place] [address]   [phone]            Feb 15, 2017  9:00 AM   Level 4 with NORTH CHAIR 4   [facility] ([facility])    [facility]  [address]  [place] [address]   [phone]            Feb 15, 2017  9:45 AM   Return Visit with Gretel Quinn MD   [facility] ([facility])    [facility]  [address]  [place] [address]   [phone]            Feb 22, 2017 10:30 AM   Level 4 with NORTH CHAIR 4   [facility] ([facility])    [facility]  [address]  [place] [address]   [phone]            Mar 01, 2017 10:30 AM   Level 4 with NORTH CHAIR 4  "  [facility] ([facility])    n [facility] [place]  [address]  [place] [address] [phone]              Future tests that were ordered for you today     Open Standing Orders        Priority Remaining Interval Expires Ordered    Red blood cell prepare order unit Routine 99/100 CONDITIONAL (SPECIFY) BLOOD  1/18/2017    Transfuse red blood cell unit Routine 99/100 TRANSFUSE 1 UNIT  1/18/2017          Open Future Orders        Priority Expected Expires Ordered    Protein electrophoresis Routine 2/8/2017 10/18/2017 1/18/2017    Kappa and lambda light chain Routine 2/8/2017 10/18/2017 1/18/2017    Protein Immunofixation Serum Routine 2/8/2017 10/18/2017 1/18/2017            Who to contact     If you have questions or need follow up information about today's clinic visit or your schedule please contact [facility] directly at [phone].  Normal or non-critical lab and imaging results will be communicated to you by Mashapehart, letter or phone within 4 business days after the clinic has received the results. If you do not hear from us within 7 days, please contact the clinic through Kavalia or phone. If you have a critical or abnormal lab result, we will notify you by phone as soon as possible.  Submit refill requests through Kavalia or call your pharmacy and they will forward the refill request to us. Please allow 3 business days for your refill to be completed.          Additional Information About Your Visit        Kavalia Information     Kavalia lets you send messages to your doctor, view your test results, renew your prescriptions, schedule appointments and more. To sign up, go to [email or web address] . Click on \"Log in\" on the left side of the screen, which will take you to the Welcome page. Then click on \"Sign up Now\" on the right side of the page.     You will be asked to enter the access code listed " below, as well as some personal information. Please follow the directions to create your username and password.     Your access code is: [email or web address]  Expires: 3/28/2017 12:15 PM     Your access code will  in 90 days. If you need help or a new code, please call your [facility] or [phone].        Care EveryWhere ID     This is your Care EveryWhere ID. This could be used by other organizations to access your [place] medical records  [id number]        Your Vitals Were     Pulse Temperature Respirations Pulse Oximetry          75 97.5  F (36.4  C) (Oral) 14 95%         Blood Pressure from Last 3 Encounters:   17 132/67   17 132/67   17 170/86    Weight from Last 3 Encounters:   17 86.637 kg (191 lb)   17 86.637 kg (191 lb)   17 86.909 kg (191 lb 9.6 oz)              We Performed the Following     ABO/Rh type and screen     Blood component     CBC with platelets differential     Comprehensive metabolic panel     Obtain affirmation of informed consent     Red blood cell prepare order unit     Transfuse red blood cell unit     Treatment Conditions     Treatment Conditions        Primary Care Provider Office Phone # Fax #    Dickson Reich [phone]       [facility] [address] [facility] [address]        Thank you!     Thank you for choosing [facility]  for your care. Our goal is always to provide you with excellent care. Hearing back from our patients is one way we can continue to improve our services. Please take a few minutes to complete the written survey that you may receive in the mail after your visit with us. Thank you!             Your Updated Medication List - Protect others around you: Learn how to safely use, store and throw away your medicines at www.disposemymeds.org.          This list is accurate as of: 17 12:27 PM.  Always use your most recent med list.                    Brand Name Dispense Instructions for use    acyclovir 400 MG tablet    ZOVIRAX    60 tablet    Take 1 tablet (400 mg) by mouth 2 times daily Viral Prophylaxis.       amLODIPine 5 MG tablet    NORVASC    30 tablet    Take 1 tablet (5 mg) by mouth daily       clotrimazole-betamethasone cream    LOTRISONE    30 g    Apply topically to the head of the penis two times a day for up to 2 weeks.       dexamethasone 4 MG tablet    DECADRON    20 tablet    Take 20 mg weekly       ferrous sulfate 325 (65 FE) MG tablet    IRON     Take 325 mg by mouth daily (with breakfast)       FREESTYLE LITE test strip   Generic drug:  blood glucose monitoring     200 each    USE TO TEST BLOOD GLUCOSE TWICE DAILY       gemfibrozil 600 MG tablet    LOPID    180 tablet    TAKE 1 TABLET BY MOUTH TWICE DAILY       glipiZIDE 10 MG tablet    GLUCOTROL    180 tablet    TAKE 1 TABLET BY MOUTH TWICE DAILY BEFORE A MEAL       * insulin aspart 100 UNIT/ML injection    NovoLOG PEN    9 mL    Inject 1-7 Units Subcutaneous 3 times daily (before meals)       * insulin aspart 100 UNIT/ML injection    NovoLOG PEN     Inject 1-5 Units Subcutaneous At Bedtime       levothyroxine 25 MCG tablet    SYNTHROID/LEVOTHROID    90 tablet    TAKE 1 TABLET BY MOUTH EVERY DAY       lisinopril 30 MG tablet    PRINIVIL,ZESTRIL    90 tablet    TAKE 1 TABLET BY MOUTH EVERY DAY       metoprolol 25 MG tablet    LOPRESSOR    180 tablet    TAKE 1 TABLET BY MOUTH TWICE DAILY       omeprazole 20 MG CR capsule    priLOSEC    90 capsule    TAKE 1 CAPSULE BY MOUTH EVERY DAY       ondansetron 8 MG tablet    ZOFRAN    10 tablet    Take 1 tablet (8 mg) by mouth every 8 hours as needed (Nausea/Vomiting)       polyethylene glycol Packet    MIRALAX/GLYCOLAX     Take 17 g by mouth daily       prochlorperazine 5 MG tablet    COMPAZINE    30 tablet    Take 1 tablet (5 mg) by mouth every 6 hours as needed (Nausea/Vomiting)       Selenium 200 MCG Tabs      Take 100 mcg by mouth daily. Pt  takes one half tab of the 200 mcg daily       tamsulosin 0.4 MG capsule    FLOMAX    90 capsule    TAKE 1 CAPSULE BY MOUTH DAILY       VITAMIN C PO      Take 1,000 mg by mouth daily       VITAMIN D3 PO      Take 1,000 Units by mouth daily       * Notice:  This list has 2 medication(s) that are the same as other medications prescribed for you. Read the directions carefully, and ask your doctor or other care provider to review them with you.

## 2017-01-18 NOTE — PROGRESS NOTES
Infusion Nursing Note:  Roc Parkinson presents today for Velcade labs possible transfusion .    Patient seen by provider today: Yes: Dr Quinn    Note: OK for Velcade with platelets of 59,000 per Dr Quinn  Will transfuse for HBG of 7.8 with 1 u of PC's  Intravenous Access:  Labs drawn without difficulty.  Peripheral IV placed.    Treatment Conditions:  HGB      7.8   1/18/2017  WBC      2.1   1/18/2017   ANEU      1.6   1/18/2017  PLT       59   1/18/2017     NA      130   1/18/2017                POTASSIUM      5.3   1/18/2017        No results found for this basename: mag         CR     1.23   1/18/2017                MICHELLE      9.6   1/18/2017             BILITOTAL      0.6   1/18/2017        ALBUMIN      2.6   1/18/2017                 ALT       24   1/18/2017        AST       13   1/18/2017  Results reviewed, labs MET treatment parameters, ok to proceed with treatment.  Blood transfusion consent signed 12-.      Post Infusion Assessment:  Patient tolerated infusion without incident.  Blood return noted pre and post infusion.  Site patent and intact, free from redness, edema or discomfort.  No evidence of extravasations.  Access discontinued per protocol.    Discharge Plan:   Discharge instructions reviewed with: Patient.  Patient and/or family verbalized understanding of discharge instructions and all questions answered.  Copy of AVS reviewed with patient and/or family.  Patient will return 1- for next appointment.  Patient discharged in stable condition accompanied by: self.  Departure Mode: Ambulatory.    Dora Mcgrath RN                    1-

## 2017-01-18 NOTE — PROGRESS NOTES
[facility] PHYSICIANS  HEMATOLOGY ONCOLOGY     DIAGNOSIS:  Kappa light chain multiple myeloma in a 89-year-old patient.  Bone marrow biopsy on 11/17/2016 showed hypercellular bone marrow with 65% cellularity of light chain restricted plasma cells.  There is a background of myelodysplastic syndrome.  Cytogenetics and FISH panel are pending at this point.  The patient was initially seen in the hospital on 11/17/2016 for macrocytic anemia and pancytopenia and transfusion requirement and a bone marrow biopsy was performed.      TREATMENT: 12/15/16 velcade/dex      SUBJECTIVE:  The patient was seen as a followup today. He is tolerating treatment well. No neuropathy.     REVIEW OF SYSTEMS:  A complete review of systems was performed and found to be negative other than pertinent positives mentioned in history of present illness.     Past medical, social histories reviewed.    Meds- Reviewed.     PHYSICAL EXAMINATION:   VITAL SIGNS:  /67 mmHg  Pulse 75  Temp(Src) 97.5  F (36.4  C) (Oral)  Resp 14  Wt 86.637 kg (191 lb)  SpO2 95%  GENERAL: Sitting comfortably.   HEENT: Pupils are equal. Oropharynx is clear.   NECK: No cervical or supraclavicular lymphadenopathy.   LUNGS: Clear bilaterally.   HEART: S1, S2, regular.   ABDOMEN: Soft, nontender, nondistended, no hepatosplenomegaly.   EXTREMITIES: Warm, well perfused.   NEUROLOGIC: Alert, awake.   SKIN: No rash.   LYMPHATICS: No edema.      LABORATORY DATA:   Recent Labs   Lab Test  01/11/17   1030  12/18/16   0559   NA  128*  137   POTASSIUM  5.5*  4.0   CHLORIDE  97  105   CO2  22  25   ANIONGAP  9  7   BUN  38*  37*   CR  1.24  1.12   GLC  384*  127*   MICHELLE  9.6  9.7     Recent Labs   Lab Test  01/18/17   1010  01/11/17   1030  01/04/17   1316   WBC  2.1*  2.3*  2.4*   HGB  7.8*  7.5*  7.9*   PLT  59*  32*  39*   MCV  99  99  102*   NEUTROPHIL  77.6  69.1  76.2     Recent Labs   Lab Test  01/11/17   1030  12/17/16   1007  12/15/16   1305   11/17/16    0938   BILITOTAL  0.4  0.5  0.4   < >   --    ALKPHOS  73  93  95   < >   --    ALT  28  19  16   < >   --    AST  22  10  10   < >   --    ALBUMIN  2.6*  2.7*  2.7*   < >   --    LDH   --    --    --    --   110    < > = values in this interval not displayed.      ECOG PS: 1    ASSESSMENT:  This is a 89-year-old gentleman who has kappa light chain multiple myeloma with hypercellular marrow with 65% of cells are light chain restricted plasma cells.  He has severe pancytopenia and has needed a transfusion in the hospital.  He does not have hypercalcemia and his renal function was normal.      He has a background of myelodysplastic syndrome on his bone marrow biopsy; however, majority of the cell population was monoclonal plasma cell population and I think that his cytopenias are primarily related to multiple myeloma.  Today we had a detailed lengthy discussion about the disease process and further plan of care.  At this point we will proceed with further staging workup which will include CBC, CMP, serum protein electrophoresis and immunofixation and light chain assay.  I will also have him collect a 24-hour urine for urine protein electrophoresis, urine immunofixation and urine light chain assay.  Will also get a bone  survey done.  The patient mentioned that he will need some time to think about before making a decision to initiate the treatment.      He is on weekly Velcade with dexamethasone 20 mg every week with every week labs and transfusion support at this time.     He is tolerating treatment well.      PLAN:   1.  Continue Velcade and dexamethasone. Velcade today.  2.  CBC, diff weekly- transfuse to keep hemoglobin of 8 and platelet of 10,000.   3.  RTC MD 2/15  4.  Labs on 2/8 SPEP, IFXN, light chains  5.  Continue Pete JON MD    1/18/2017

## 2017-01-19 ENCOUNTER — CARE COORDINATION (OUTPATIENT)
Dept: CARE COORDINATION | Facility: CLINIC | Age: 82
End: 2017-01-19

## 2017-01-19 NOTE — PROGRESS NOTES
Clinic Care Coordination Contact    Patient is again receiving chemotherapy. He is followed closely by oncology and has care coordination with oncology office, Vi.  Patient is finished with home care services.     Will close to clinic care coordination at this time and re-open as needed.     Olga Lidia Oneil RN, CCM - Care Coordinator     1/19/2017    10:53 AM  [phone]

## 2017-01-25 ENCOUNTER — INFUSION THERAPY VISIT (OUTPATIENT)
Dept: INFUSION THERAPY | Facility: CLINIC | Age: 82
End: 2017-01-25
Attending: INTERNAL MEDICINE
Payer: MEDICARE

## 2017-01-25 ENCOUNTER — HOSPITAL ENCOUNTER (OUTPATIENT)
Facility: CLINIC | Age: 82
Setting detail: SPECIMEN
Discharge: HOME | End: 2017-01-25
Attending: INTERNAL MEDICINE | Admitting: INTERNAL MEDICINE
Payer: MEDICARE

## 2017-01-25 VITALS
WEIGHT: 189.4 LBS | BODY MASS INDEX: 27.11 KG/M2 | HEART RATE: 69 BPM | OXYGEN SATURATION: 98 % | TEMPERATURE: 97.3 F | HEIGHT: 70 IN | RESPIRATION RATE: 16 BRPM | SYSTOLIC BLOOD PRESSURE: 159 MMHG | DIASTOLIC BLOOD PRESSURE: 80 MMHG

## 2017-01-25 DIAGNOSIS — E83.52 HYPERCALCEMIA: ICD-10-CM

## 2017-01-25 DIAGNOSIS — C90.00 MULTIPLE MYELOMA NOT HAVING ACHIEVED REMISSION (H): Primary | ICD-10-CM

## 2017-01-25 LAB
ALBUMIN SERPL-MCNC: 2.7 G/DL (ref 3.4–5)
BASOPHILS # BLD AUTO: 0 10E9/L (ref 0–0.2)
BASOPHILS NFR BLD AUTO: 0 %
CALCIUM SERPL-MCNC: 10 MG/DL (ref 8.5–10.1)
CREAT SERPL-MCNC: 1.16 MG/DL (ref 0.66–1.25)
DIFFERENTIAL METHOD BLD: ABNORMAL
EOSINOPHIL # BLD AUTO: 0 10E9/L (ref 0–0.7)
EOSINOPHIL NFR BLD AUTO: 0.5 %
ERYTHROCYTE [DISTWIDTH] IN BLOOD BY AUTOMATED COUNT: 22.2 % (ref 10–15)
GFR SERPL CREATININE-BSD FRML MDRD: 59 ML/MIN/1.7M2
HCT VFR BLD AUTO: 24.2 % (ref 40–53)
HGB BLD-MCNC: 8.3 G/DL (ref 13.3–17.7)
IMM GRANULOCYTES # BLD: 0 10E9/L (ref 0–0.4)
IMM GRANULOCYTES NFR BLD: 0.5 %
LYMPHOCYTES # BLD AUTO: 0.5 10E9/L (ref 0.8–5.3)
LYMPHOCYTES NFR BLD AUTO: 22.5 %
MCH RBC QN AUTO: 33.9 PG (ref 26.5–33)
MCHC RBC AUTO-ENTMCNC: 34.3 G/DL (ref 31.5–36.5)
MCV RBC AUTO: 99 FL (ref 78–100)
MONOCYTES # BLD AUTO: 0.1 10E9/L (ref 0–1.3)
MONOCYTES NFR BLD AUTO: 2.3 %
NEUTROPHILS # BLD AUTO: 1.6 10E9/L (ref 1.6–8.3)
NEUTROPHILS NFR BLD AUTO: 74.2 %
NRBC # BLD AUTO: 0 10*3/UL
NRBC BLD AUTO-RTO: 0 /100
PLATELET # BLD AUTO: 42 10E9/L (ref 150–450)
RBC # BLD AUTO: 2.45 10E12/L (ref 4.4–5.9)
WBC # BLD AUTO: 2.1 10E9/L (ref 4–11)

## 2017-01-25 PROCEDURE — 82565 ASSAY OF CREATININE: CPT | Performed by: INTERNAL MEDICINE

## 2017-01-25 PROCEDURE — 96401 CHEMO ANTI-NEOPL SQ/IM: CPT

## 2017-01-25 PROCEDURE — 25000128 H RX IP 250 OP 636: Performed by: INTERNAL MEDICINE

## 2017-01-25 PROCEDURE — 82040 ASSAY OF SERUM ALBUMIN: CPT | Performed by: INTERNAL MEDICINE

## 2017-01-25 PROCEDURE — 85025 COMPLETE CBC W/AUTO DIFF WBC: CPT | Performed by: INTERNAL MEDICINE

## 2017-01-25 PROCEDURE — 82310 ASSAY OF CALCIUM: CPT | Performed by: INTERNAL MEDICINE

## 2017-01-25 PROCEDURE — 96367 TX/PROPH/DG ADDL SEQ IV INF: CPT

## 2017-01-25 PROCEDURE — 96365 THER/PROPH/DIAG IV INF INIT: CPT

## 2017-01-25 RX ADMIN — ZOLEDRONIC ACID 3.5 MG: 0.8 INJECTION, SOLUTION, CONCENTRATE INTRAVENOUS at 11:46

## 2017-01-25 RX ADMIN — BORTEZOMIB 2.1 MG: 3.5 INJECTION, POWDER, LYOPHILIZED, FOR SOLUTION INTRAVENOUS; SUBCUTANEOUS at 11:56

## 2017-01-25 RX ADMIN — SODIUM CHLORIDE 250 ML: 9 INJECTION, SOLUTION INTRAVENOUS at 11:30

## 2017-01-25 NOTE — PROGRESS NOTES
Infusion Nursing Note:  Roc Parkinson presents today for C2D8 velcade.    Patient seen by provider today: No   present during visit today: Not Applicable.    Note: N/A.    Intravenous Access:  Peripheral IV placed.    Treatment Conditions:  HGB      8.3   1/25/2017  WBC      2.1   1/25/2017   ANEU      1.6   1/25/2017  PLT       42   1/25/2017 - OK FOR VELCADE PER DR. JON     NA      130   1/18/2017                POTASSIUM      5.3   1/18/2017        No results found for this basename: mag         CR     1.16   1/25/2017                MICHELLE     10.0   1/25/2017             BILITOTAL      0.6   1/18/2017        ALBUMIN      2.7   1/25/2017                 ALT       24   1/18/2017        AST       13   1/18/2017  Results reviewed, labs MET treatment parameters, ok to proceed with treatment.  NO BLOOD TRANSFUSIONS NEEDED TODAY      Post Infusion Assessment:  Patient tolerated infusion without incident - zometa  Patient tolerated injection without incident - velcade  Blood return noted pre and post infusion.  Site patent and intact, free from redness, edema or discomfort.  No evidence of extravasations.  Access discontinued per protocol.    Discharge Plan:   [facility]  Patient will return 2/1/17 for next appointment.   Patient discharged in stable condition accompanied by: self.  Departure Mode: Ambulatory.    Alfa Morgan RN

## 2017-01-27 ENCOUNTER — TRANSFERRED RECORDS (OUTPATIENT)
Dept: HEALTH INFORMATION MANAGEMENT | Facility: CLINIC | Age: 82
End: 2017-01-27

## 2017-01-30 ENCOUNTER — OFFICE VISIT (OUTPATIENT)
Dept: FAMILY MEDICINE | Facility: CLINIC | Age: 82
End: 2017-01-30
Payer: COMMERCIAL

## 2017-01-30 VITALS
WEIGHT: 192 LBS | DIASTOLIC BLOOD PRESSURE: 60 MMHG | HEART RATE: 83 BPM | TEMPERATURE: 98.1 F | SYSTOLIC BLOOD PRESSURE: 134 MMHG | HEIGHT: 70 IN | RESPIRATION RATE: 18 BRPM | BODY MASS INDEX: 27.49 KG/M2

## 2017-01-30 DIAGNOSIS — E11.22 TYPE 2 DIABETES MELLITUS WITH STAGE 3 CHRONIC KIDNEY DISEASE, WITH LONG-TERM CURRENT USE OF INSULIN (H): Primary | ICD-10-CM

## 2017-01-30 DIAGNOSIS — N18.30 TYPE 2 DIABETES MELLITUS WITH STAGE 3 CHRONIC KIDNEY DISEASE, WITH LONG-TERM CURRENT USE OF INSULIN (H): Primary | ICD-10-CM

## 2017-01-30 DIAGNOSIS — N18.30 CKD (CHRONIC KIDNEY DISEASE) STAGE 3, GFR 30-59 ML/MIN (H): Chronic | ICD-10-CM

## 2017-01-30 DIAGNOSIS — Z79.4 TYPE 2 DIABETES MELLITUS WITH STAGE 3 CHRONIC KIDNEY DISEASE, WITH LONG-TERM CURRENT USE OF INSULIN (H): Primary | ICD-10-CM

## 2017-01-30 PROCEDURE — 99214 OFFICE O/P EST MOD 30 MIN: CPT | Performed by: FAMILY MEDICINE

## 2017-01-30 NOTE — PATIENT INSTRUCTIONS
I will refer the patient to diabetic educators for assistance with his Humalog.  He will bring along his current sliding scale that he is using at home which is obviously not working to the visit with the CDE.  I am not sure where the confusion has occurred.  I will have him follow-up with me in 1-2 weeks hopefully after he is seen one of the CDE's.  I did refill his insulin needles.  He will make his CDE appointment at [facility].    In all other respects he is actually feeling very well.  He has been largely asymptomatic with his elevated blood sugars.I did not change any of his other medications today.    25 minutes was spent with the patient over half in counseling about his diabetes and its lack of control.  Without seeing his current sliding scale I opted not to change him back to the limits in the electronic health record.  He will make sure that he takes his sliding scale that he has at home to his next visit with the CDE.  He will follow-up with me in about a week or so.  We can see him sooner as needed.

## 2017-01-30 NOTE — PROGRESS NOTES
SUBJECTIVE:                                                    Roc Parkinson is a 89 year old male who presents to clinic today for the following health issues:      Diabetes Follow-up    Patient is checking blood sugars: 2 times daily.  Results are as follows:         Between 300 to over 500    Diabetic concerns: None and blood sugar frequently over 200     Symptoms of hypoglycemia (low blood sugar): none     Paresthesias (numbness or burning in feet) or sores: No     Date of last diabetic eye exam: 1/26/17       Amount of exercise or physical activity: None    Problems taking medications regularly: No    Medication side effects: none    Diet: regular (no restrictions)        Problem list and histories reviewed & adjusted, as indicated.  Additional history: The patient has been under treatment of late by Dr. Quinn for multiple myeloma.  His pretreatment for that includes dexamethasone.  With that his blood sugars have been running 300-500.  He's frustrated that he can't get his blood sugars any lower than that.  He has never been on insulin in the past.  He says that at the infusion clinic they gave him Humalog pens and a sliding scale to follow.  When I asked him if used taking blood sugars 4 times a day and giving himself for shots of insulin daily he said no.  He said he was doing it twice daily.  When I asked what the dosing of the Humalog was which in our records is 1-7 units before meals and 1-5 units at bedtime the maximum that Mr. Parkinson has been giving himself his 3 units.  Also, he has only been doing that twice daily.  Mr. Parkinson is not exactly sure where he got the sliding scale he has at home.  He's not sure if it came with the Humalog or he got it from the infusion clinic.  Problem list, Medication list, Allergies, and Medical/Social/Surgical histories reviewed in [facility] and updated as appropriate.    ROS:  Constitutional, HEENT, cardiovascular, pulmonary, gi and gu systems are negative, except  "as otherwise noted.    OBJECTIVE:                                                    /60 mmHg  Pulse 83  Temp(Src) 98.1  F (36.7  C) (Tympanic)  Resp 18  Ht 5' 10\" (1.778 m)  Wt 192 lb (87.091 kg)  BMI 27.55 kg/m2  Body mass index is 27.55 kg/(m^2).  GENERAL APPEARANCE: healthy, alert and no distress         ASSESSMENT/PLAN:                                                        ICD-10-CM    1. Type 2 diabetes mellitus with stage 3 chronic kidney disease, with long-term current use of insulin (H) E11.22 insulin pen needle 31G X 6 MM    N18.3 DIABETES EDUCATOR REFERRAL    Z79.4    2. CKD (chronic kidney disease) stage 3, GFR 30-59 ml/min N18.3        Patient Instructions   I will refer the patient to diabetic educators for assistance with his Humalog.  He will bring along his current sliding scale that he is using at home which is obviously not working to the visit with the CDE.  I am not sure where the confusion has occurred.  I will have him follow-up with me in 1-2 weeks hopefully after he is seen one of the CDE's.  I did refill his insulin needles.  He will make his CDE appointment at [facility].    In all other respects he is actually feeling very well.  He has been largely asymptomatic with his elevated blood sugars.I did not change any of his other medications today.    25 minutes was spent with the patient over half in counseling about his diabetes and its lack of control.  Without seeing his current sliding scale I opted not to change him back to the limits in the electronic health record.  He will make sure that he takes his sliding scale that he has at home to his next visit with the CDE.  He will follow-up with me in about a week or so.  We can see him sooner as needed.        Dickson Reich MD  [facility]"

## 2017-01-30 NOTE — NURSING NOTE
"Chief Complaint   Patient presents with     Diabetes       Initial /60 mmHg  Pulse 83  Temp(Src) 98.1  F (36.7  C) (Tympanic)  Resp 18  Ht 5' 10\" (1.778 m)  Wt 192 lb (87.091 kg)  BMI 27.55 kg/m2 Estimated body mass index is 27.55 kg/(m^2) as calculated from the following:    Height as of this encounter: 5' 10\" (1.778 m).    Weight as of this encounter: 192 lb (87.091 kg).  BP completed using cuff size: casie Allen CMA      "

## 2017-01-30 NOTE — MR AVS SNAPSHOT
After Visit Summary   1/30/2017    Roc Parkinson    MRN: [id number]           Patient Information     Date Of Birth          7/7/1926        Visit Information        Provider Department      1/30/2017 4:00 PM Dickson Reich MD [facility]        Today's Diagnoses     Type 2 diabetes mellitus with stage 3 chronic kidney disease, with long-term current use of insulin (H)    -  1     CKD (chronic kidney disease) stage 3, GFR 30-59 ml/min           Care Instructions    I will refer the patient to diabetic educators for assistance with his Humalog.  He will bring along his current sliding scale that he is using at home which is obviously not working to the visit with the CDE.  I am not sure where the confusion has occurred.  I will have him follow-up with me in 1-2 weeks hopefully after he is seen one of the CDE's.  I did refill his insulin needles.  He will make his CDE appointment at [facility].    In all other respects he is actually feeling very well.  He has been largely asymptomatic with his elevated blood sugars.I did not change any of his other medications today.    25 minutes was spent with the patient over half in counseling about his diabetes and its lack of control.  Without seeing his current sliding scale I opted not to change him back to the limits in the electronic health record.  He will make sure that he takes his sliding scale that he has at home to his next visit with the CDE.  He will follow-up with me in about a week or so.  We can see him sooner as needed.        Follow-ups after your visit        Additional Services     DIABETES EDUCATOR REFERRAL       DIABETES SELF MANAGEMENT TRAINING (DSMT)      Your provider has referred you to Diabetes Education: [facility]: Diabetes Education - All [facility] [phone]   [email or web address]    Type of training and number of hours: Previous Diagnosis:  Follow-up DSMT - 2 hours.    Medicare covers: 10 hours of initial DSMT in 12 month period from the time of first visit, plus 2 hours of follow-up DSMT annually, and additional hours as requested for insulin training.    Diabetes Type: Type 2 - On Insulin             Diabetes Co-Morbidities: other recent diagnosis of cancer               A1C Goal:  <8.0       A1C is: A1C      8.0   11/14/2016    If an urgent visit is needed or A1C is above 12, Care Team to call the Diabetes Education Team at [phone] or send an In Basket message to the Diabetes Education Pool (P DIAB ED-PATIENT CARE).    Diabetes Education Topics: Medication Start: patient is on sliding scale humalog and has different scales other than what is in the EHR    Special Educational Needs Requiring Individual DSMT: Additional Insulin Training       MEDICAL NUTRITION THERAPY (MNT) for Diabetes    Medical Nutrition Therapy with a Registered Dietitian can be provided in coordination with Diabetes Self-Management Training to assist in achieving optimal diabetes management.    MNT Type and Hours: Previous diagnosis: Annual follow-up MNT - 2 hours                       Medicare will cover: 3 hours initial MNT in 12 month period after first visit, plus 2 hours of follow-up MNT annually    Please be aware that coverage of these services is subject to the terms and limitations of your health insurance plan.  Call member services at your health plan to determine Diabetes Self-Management Training benefits and ask which blood glucose monitor brands are covered by your plan.      Please bring the following with you to your appointment:    (1)  List of current medications   (2)  List of Blood Glucose Monitor brands that are covered by your insurance plan  (3)  Blood Glucose Monitor and log book  (4)   Food records for the 3 days prior to your visit    The Certified Diabetes Educator may make diabetes medication adjustments per the CDE Protocol and Collaborative  Practice Agreement.                  Your next 10 appointments already scheduled     Feb 01, 2017 11:00 AM   Level 1 with NORTH CHAIR 5   [facility] ([facility])    [facility] [place]  [address]  [place] [address]   [phone]            Feb 08, 2017  1:00 PM   Level 1 with NORTH CHAIR 10   [facility] ([facility])    [facility] [place] [address]  [place] [address]   [phone]            Feb 15, 2017  9:00 AM   Level 4 with NORTH CHAIR 4   [facility] ([facility])    [facility] [place]  [address]  [place] [address]   [phone]            Feb 15, 2017  9:45 AM   Return Visit with Gretel Quinn MD   [facility] ([facility])    [facility] [place]  [address]  [place] [address]   [phone]            Feb 20, 2017 10:30 AM   SHORT with Dickson Reich MD   [facility] ([address] [address]   [phone]            Feb 22, 2017 10:30 AM   Level 4 with NORTH CHAIR 4   [facility] ([facility])    [facility] [place]  [address]  [place] [address]   [phone]            Mar 01, 2017 10:30 AM   Level 4 with NORTH CHAIR 4   [facility] ([facility])    [facility] [place]  [address]  [place] [address]   [phone]              Who to contact     If you have questions or need follow up information about today's clinic visit or your schedule please contact [facility] directly at [phone].  Normal or non-critical lab  "and imaging results will be communicated to you by MyChart, letter or phone within 4 business days after the clinic has received the results. If you do not hear from us within 7 days, please contact the clinic through [employer] or phone. If you have a critical or abnormal lab result, we will notify you by phone as soon as possible.  Submit refill requests through [facility] or call your pharmacy and they will forward the refill request to us. Please allow 3 business days for your refill to be completed.          Additional Information About Your Visit        [employer] Information     [facility] lets you send messages to your doctor, view your test results, renew your prescriptions, schedule appointments and more. To sign up, go to [email or web address] . Click on \"Log in\" on the left side of the screen, which will take you to the Welcome page. Then click on \"Sign up Now\" on the right side of the page.     You will be asked to enter the access code listed below, as well as some personal information. Please follow the directions to create your username and password.     Your access code is: [email or web address]  Expires: 3/28/2017 12:15 PM     Your access code will  in 90 days. If you need help or a new code, please call your [place] clinic or [phone].        Care EveryWhere ID     This is your Care EveryWhere ID. This could be used by other organizations to access your [place] medical records  [id number]        Your Vitals Were     Pulse Temperature Respirations Height BMI (Body Mass Index)       83 98.1  F (36.7  C) (Tympanic) 18 5' 10\" (1.778 m) 27.55 kg/m2        Blood Pressure from Last 3 Encounters:   17 134/60   17 159/80   17 132/67    Weight from Last 3 Encounters:   17 192 lb (87.091 kg)   17 189 lb 6.4 oz (85.911 kg)   17 191 lb (86.637 kg)              We Performed the Following     DIABETES EDUCATOR REFERRAL          Today's Medication Changes          These changes are " accurate as of: 1/30/17  5:44 PM.  If you have any questions, ask your nurse or doctor.               Start taking these medicines.        Dose/Directions    insulin pen needle 31G X 6 MM   Used for:  Type 2 diabetes mellitus with stage 3 chronic kidney disease, with long-term current use of insulin (H)   Started by:  Dickson Reich MD        Use 2 x daily or as directed.   Quantity:  100 each   Refills:  11            Where to get your medicines      These medications were sent to [id number] - [place], [place] - [address] AT [facility] [address], [facility] [address]    Hours:  24-hours Phone:  [phone]    - insulin pen needle 31G X 6 MM             Primary Care Provider Office Phone # Fax #    Dickson Reich [phone]       [facility] [address] [facility] [address]        Thank you!     Thank you for choosing [facility]  for your care. Our goal is always to provide you with excellent care. Hearing back from our patients is one way we can continue to improve our services. Please take a few minutes to complete the written survey that you may receive in the mail after your visit with us. Thank you!             Your Updated Medication List - Protect others around you: Learn how to safely use, store and throw away your medicines at www.disposemymeds.org.          This list is accurate as of: 1/30/17  5:44 PM.  Always use your most recent med list.                   Brand Name Dispense Instructions for use    acyclovir 400 MG tablet    ZOVIRAX    60 tablet    Take 1 tablet (400 mg) by mouth 2 times daily Viral Prophylaxis.       amLODIPine 5 MG tablet    NORVASC    30 tablet    Take 1 tablet (5 mg) by mouth daily       clotrimazole-betamethasone cream    LOTRISONE    30 g    Apply topically to the head of the penis two times a day for up to 2 weeks.       dexamethasone 4 MG tablet     DECADRON    20 tablet    Take 20 mg weekly       ferrous sulfate 325 (65 FE) MG tablet    IRON     Take 325 mg by mouth daily (with breakfast)       FREESTYLE LITE test strip   Generic drug:  blood glucose monitoring     200 each    USE TO TEST BLOOD GLUCOSE TWICE DAILY       gemfibrozil 600 MG tablet    LOPID    180 tablet    TAKE 1 TABLET BY MOUTH TWICE DAILY       glipiZIDE 10 MG tablet    GLUCOTROL    180 tablet    TAKE 1 TABLET BY MOUTH TWICE DAILY BEFORE A MEAL       * insulin aspart 100 UNIT/ML injection    NovoLOG PEN    9 mL    Inject 1-7 Units Subcutaneous 3 times daily (before meals)       * insulin aspart 100 UNIT/ML injection    NovoLOG PEN     Inject 1-5 Units Subcutaneous At Bedtime       insulin pen needle 31G X 6 MM     100 each    Use 2 x daily or as directed.       levothyroxine 25 MCG tablet    SYNTHROID/LEVOTHROID    90 tablet    TAKE 1 TABLET BY MOUTH EVERY DAY       lisinopril 30 MG tablet    PRINIVIL,ZESTRIL    90 tablet    TAKE 1 TABLET BY MOUTH EVERY DAY       metoprolol 25 MG tablet    LOPRESSOR    180 tablet    TAKE 1 TABLET BY MOUTH TWICE DAILY       omeprazole 20 MG CR capsule    priLOSEC    90 capsule    TAKE 1 CAPSULE BY MOUTH EVERY DAY       ondansetron 8 MG tablet    ZOFRAN    10 tablet    Take 1 tablet (8 mg) by mouth every 8 hours as needed (Nausea/Vomiting)       polyethylene glycol Packet    MIRALAX/GLYCOLAX     Take 17 g by mouth daily       prochlorperazine 5 MG tablet    COMPAZINE    30 tablet    Take 1 tablet (5 mg) by mouth every 6 hours as needed (Nausea/Vomiting)       Selenium 200 MCG Tabs      Take 100 mcg by mouth daily. Pt takes one half tab of the 200 mcg daily       tamsulosin 0.4 MG capsule    FLOMAX    90 capsule    TAKE 1 CAPSULE BY MOUTH DAILY       VITAMIN C PO      Take 1,000 mg by mouth daily       VITAMIN D3 PO      Take 1,000 Units by mouth daily       * Notice:  This list has 2 medication(s) that are the same as other medications prescribed for you.  Read the directions carefully, and ask your doctor or other care provider to review them with you.

## 2017-02-01 ENCOUNTER — INFUSION THERAPY VISIT (OUTPATIENT)
Dept: INFUSION THERAPY | Facility: CLINIC | Age: 82
End: 2017-02-01
Attending: INTERNAL MEDICINE
Payer: MEDICARE

## 2017-02-01 ENCOUNTER — TELEPHONE (OUTPATIENT)
Dept: FAMILY MEDICINE | Facility: CLINIC | Age: 82
End: 2017-02-01

## 2017-02-01 ENCOUNTER — HOSPITAL ENCOUNTER (OUTPATIENT)
Facility: CLINIC | Age: 82
Setting detail: SPECIMEN
Discharge: HOME | End: 2017-02-01
Attending: INTERNAL MEDICINE | Admitting: INTERNAL MEDICINE
Payer: MEDICARE

## 2017-02-01 VITALS
TEMPERATURE: 98 F | HEART RATE: 73 BPM | WEIGHT: 193.4 LBS | DIASTOLIC BLOOD PRESSURE: 71 MMHG | RESPIRATION RATE: 16 BRPM | BODY MASS INDEX: 27.75 KG/M2 | SYSTOLIC BLOOD PRESSURE: 158 MMHG | OXYGEN SATURATION: 97 %

## 2017-02-01 DIAGNOSIS — C90.00 MULTIPLE MYELOMA NOT HAVING ACHIEVED REMISSION (H): Primary | ICD-10-CM

## 2017-02-01 DIAGNOSIS — D53.9 MACROCYTIC ANEMIA: ICD-10-CM

## 2017-02-01 LAB
ABO + RH BLD: NORMAL
ABO + RH BLD: NORMAL
BASOPHILS # BLD AUTO: 0 10E9/L (ref 0–0.2)
BASOPHILS NFR BLD AUTO: 0 %
BLD GP AB SCN SERPL QL: NORMAL
BLD PROD TYP BPU: NORMAL
BLD PROD TYP BPU: NORMAL
BLD UNIT ID BPU: 0
BLOOD BANK CMNT PATIENT-IMP: NORMAL
BLOOD PRODUCT CODE: NORMAL
BPU ID: NORMAL
DIFFERENTIAL METHOD BLD: ABNORMAL
EOSINOPHIL # BLD AUTO: 0 10E9/L (ref 0–0.7)
EOSINOPHIL NFR BLD AUTO: 0 %
ERYTHROCYTE [DISTWIDTH] IN BLOOD BY AUTOMATED COUNT: 23.8 % (ref 10–15)
HCT VFR BLD AUTO: 21.1 % (ref 40–53)
HGB BLD-MCNC: 7.4 G/DL (ref 13.3–17.7)
IMM GRANULOCYTES # BLD: 0 10E9/L (ref 0–0.4)
IMM GRANULOCYTES NFR BLD: 0.6 %
LYMPHOCYTES # BLD AUTO: 0.4 10E9/L (ref 0.8–5.3)
LYMPHOCYTES NFR BLD AUTO: 12.4 %
MCH RBC QN AUTO: 34.9 PG (ref 26.5–33)
MCHC RBC AUTO-ENTMCNC: 35.1 G/DL (ref 31.5–36.5)
MCV RBC AUTO: 100 FL (ref 78–100)
MONOCYTES # BLD AUTO: 0.1 10E9/L (ref 0–1.3)
MONOCYTES NFR BLD AUTO: 2.9 %
NEUTROPHILS # BLD AUTO: 2.9 10E9/L (ref 1.6–8.3)
NEUTROPHILS NFR BLD AUTO: 84.1 %
NRBC # BLD AUTO: 0 10*3/UL
NRBC BLD AUTO-RTO: 0 /100
NUM BPU REQUESTED: 1
PLATELET # BLD AUTO: 41 10E9/L (ref 150–450)
RBC # BLD AUTO: 2.12 10E12/L (ref 4.4–5.9)
SPECIMEN EXP DATE BLD: NORMAL
TRANSFUSION STATUS PATIENT QL: NORMAL
TRANSFUSION STATUS PATIENT QL: NORMAL
WBC # BLD AUTO: 3.5 10E9/L (ref 4–11)

## 2017-02-01 PROCEDURE — 36430 TRANSFUSION BLD/BLD COMPNT: CPT

## 2017-02-01 PROCEDURE — P9016 RBC LEUKOCYTES REDUCED: HCPCS | Performed by: INTERNAL MEDICINE

## 2017-02-01 PROCEDURE — 86850 RBC ANTIBODY SCREEN: CPT | Performed by: INTERNAL MEDICINE

## 2017-02-01 PROCEDURE — 85025 COMPLETE CBC W/AUTO DIFF WBC: CPT | Performed by: INTERNAL MEDICINE

## 2017-02-01 PROCEDURE — 86923 COMPATIBILITY TEST ELECTRIC: CPT | Performed by: INTERNAL MEDICINE

## 2017-02-01 PROCEDURE — 86900 BLOOD TYPING SEROLOGIC ABO: CPT | Performed by: INTERNAL MEDICINE

## 2017-02-01 PROCEDURE — 25000128 H RX IP 250 OP 636: Mod: JW | Performed by: INTERNAL MEDICINE

## 2017-02-01 PROCEDURE — 86901 BLOOD TYPING SEROLOGIC RH(D): CPT | Performed by: INTERNAL MEDICINE

## 2017-02-01 PROCEDURE — 96401 CHEMO ANTI-NEOPL SQ/IM: CPT

## 2017-02-01 RX ADMIN — BORTEZOMIB 2.1 MG: 3.5 INJECTION, POWDER, LYOPHILIZED, FOR SOLUTION INTRAVENOUS; SUBCUTANEOUS at 14:45

## 2017-02-01 NOTE — PROGRESS NOTES
Infusion Nursing Note:  Roc Parkinson presents today for velcade possible transfusion.    Patient seen by provider today: No   present during visit today: Not Applicable.    Note: Per Dr. Kai yee to give velcade today with plt count of 41,000.    Intravenous Access:  Peripheral IV placed.    Treatment Conditions:  HGB      7.4   2/1/2017 - one unit of prbc given today  WBC      3.5   2/1/2017   ANEU      2.9   2/1/2017  PLT       41   2/1/2017     Results reviewed, labs MET treatment parameters for 1 unit PRBCs.  Results reviewed, labs did NOT meet treatment parameters for velcade: verbal order per Dr. Quinn given to proceed with velcade today.      Post Infusion Assessment:  Patient tolerated infusion without incident.  Patient tolerated injection without incident.  Blood return noted pre and post infusion.  Site patent and intact, free from redness, edema or discomfort.  No evidence of extravasations.  Access discontinued per protocol.    Discharge Plan:   AVS to patient via MYCHART.  Patient will return 2/8/17 for next appointment.   Patient discharged in stable condition accompanied by: self.  Departure Mode: Ambulatory.    KENDALL Medina RN (discharge)

## 2017-02-06 ENCOUNTER — ALLIED HEALTH/NURSE VISIT (OUTPATIENT)
Dept: EDUCATION SERVICES | Facility: CLINIC | Age: 82
End: 2017-02-06
Payer: COMMERCIAL

## 2017-02-06 DIAGNOSIS — E11.65 UNCONTROLLED TYPE 2 DIABETES MELLITUS WITH HYPERGLYCEMIA, UNSPECIFIED LONG TERM INSULIN USE STATUS: Primary | ICD-10-CM

## 2017-02-06 DIAGNOSIS — Z79.4 TYPE 2 DIABETES MELLITUS WITH STAGE 3 CHRONIC KIDNEY DISEASE, WITH LONG-TERM CURRENT USE OF INSULIN (H): Primary | ICD-10-CM

## 2017-02-06 DIAGNOSIS — N18.30 TYPE 2 DIABETES MELLITUS WITH STAGE 3 CHRONIC KIDNEY DISEASE, WITH LONG-TERM CURRENT USE OF INSULIN (H): Primary | ICD-10-CM

## 2017-02-06 DIAGNOSIS — E11.22 TYPE 2 DIABETES MELLITUS WITH STAGE 3 CHRONIC KIDNEY DISEASE, WITH LONG-TERM CURRENT USE OF INSULIN (H): Primary | ICD-10-CM

## 2017-02-06 PROCEDURE — G0108 DIAB MANAGE TRN  PER INDIV: HCPCS

## 2017-02-06 NOTE — PROGRESS NOTES
Diabetes Self Management Training: Individual Review Visit    Roc Parkinson presents today for education and evaluation of glucose control related to Type 2 diabetes.    He is accompanied by self    Patient's diabetes management related comments/concerns: high blood sugars, wants to see them improve    Patient's emotional response to diabetes: expresses readiness to learn and concern for health and well-being    Patient would like this visit to be focused around the following diabetes-related behaviors and goals: Taking Medication    ASSESSMENT:  Patient Problem List and Family Medical History reviewed for relevant medical history, current medical status, and diabetes risk factors.    Pt with cancer and on dexamethasone. He has been having high blood sugars and taking his correction scale provided from the hospital 2x/day (it is ordered for 4x/day per paperwork).  He feels that would be a lot of insulin, however, then he worries his blood sugars are too high.  Of note, his correction scale amount from the hospital is lower than the amount ordered in his med list in triptap. Currently, his correction scale is not working based off reported numbers however he is not taking it 4 x/d and he did not bring any blood sugars to review today (just a couple reported numbers he could remember).   Explained why 4x/day is beneficial to him.     Current Diabetes Management per Patient:  Taking diabetes medications?   yes:     Diabetes Medication(s)     Insulin Sig    insulin aspart (NOVOLOG PEN) 100 UNIT/ML injection Inject 1-7 Units Subcutaneous 3 times daily (before meals)    insulin aspart (NOVOLOG PEN) 100 UNIT/ML injection Inject 1-5 Units Subcutaneous At Bedtime    Sulfonylureas Sig    glipiZIDE (GLUCOTROL) 10 MG tablet TAKE 1 TABLET BY MOUTH TWICE DAILY BEFORE A MEAL        On Sliding scale. Pt brought with to appointment today.  When checking blood sugar before meals  If pre-meal -239 take 1 unit     240-339 take 2  "units     340 or greater take 3 units  Check your blood sugar before bedtime and if:    200-299 give 1 unit    300-399 take 2 units    400 or greater take 3 units    Past Diabetes Education: Yes    Patient glucose self monitoring as follows: two times daily.   BG meter: did not bring meter today  BG results: 372 last night before bed and 245 this morning but did not bring log book    BG values are: Not in goal  Patient's most recent A1C      8.0   11/14/2016 is not meeting goal of <8.0    Nutrition:  Patient eats 3 meals per day but sometimes skips lunch.  Pt wanted to ask his questions about insulin and needles and then was ready to leave.     Cultural/[employer]aritan diet restrictions: No     Physical Activity:    Not assessed    Diabetes Risk Factors:  age over 45 years and hypertension    Diabetes Complications:  Not discussed today.    Vitals:  There were no vitals taken for this visit.  Estimated body mass index is 27.75 kg/(m^2) as calculated from the following:    Height as of 1/30/17: 1.778 m (5' 10\").    Weight as of 2/1/17: 87.726 kg (193 lb 6.4 oz).   Last 3 BP:   BP Readings from Last 3 Encounters:   02/01/17 158/71   01/30/17 134/60   01/25/17 159/80       History   Smoking status     Never Smoker    Smokeless tobacco     Never Used       Labs:  A1C      8.0   11/14/2016  GLC      408   1/18/2017  LDL       61   5/9/2016  LDL     96.8   8/27/2012  HDL CHOLESTEROL   Date Value Ref Range Status   05/09/2016 25* >39 mg/dL Final   ]  GFR ESTIMATE   Date Value Ref Range Status   01/25/2017 59* >60 mL/min/1.7m2 Final     Comment:     Non  GFR Calc     GFR ESTIMATE IF BLACK   Date Value Ref Range Status   01/25/2017 71 >60 mL/min/1.7m2 Final     Comment:      GFR Calc     CR     1.16   1/25/2017  No results found for this basename: microalbumin    Socio/Economic Considerations:    Support system: not assessed    Health Beliefs and Attitudes:   Patient Activation Measure Survey " Score:  FANNY Score (Last Two) 10/14/2015 9/28/2016   FANNY Raw Score 40 29   Activation Score 60 52.9   FANNY Level 3 2       Stage of Change: PREPARATION (Decided to change - considering how)      Diabetes knowledge and skills assessment:     Patient is knowledgeable in diabetes management concepts related to: Monitoring and Taking Medication    Patient needs further education on the following diabetes management concepts: Healthy Eating, Taking Medication, Problem Solving and Reducing Risks    Barriers to Learning Assessment: No Barriers identified    Based on learning assessment above, most appropriate setting for further diabetes education would be: Individual setting.    INTERVENTION:     Education provided today on:  AADE Self-Care Behaviors:  Monitoring: individual blood glucose targets, frequency of monitoring and proper sharps disposal. He was not sure where to dispose of his needles. Explained that a hard, plastic bottle with a top will work and then he can throw this in his trash when it is full. Reiterated checking his BG before each meal and at bedtime (4x/day) vs just checking it twice/day.      Taking Medication: action of prescribed medication, drawing up, administering and storing injectable diabetes medications, proper site selection and rotation for injections and when to take medications.  He has been giving insulin in his thigh which does not have much fat and he has noticed bruising. Explained that he is giving it in muscle likely and should give it in fatty tissue. Encouraged him to use his abdomen but to avoid his belly button and scars and to rotate the site.  Explained why it is important to give the insulin in fatty tissue. He has also not been storing insulin in the fridge (has had it for a month now). Educated on proper storage of insulin and that his pens are good for 28 days only at room temperature. He had questions about one of his pens that he brought with and said he could not push the  button anymore. Looked at his pen and explained that it is because he is out of insulin in that pen and needs to use a new pen.      Opportunities for ongoing education and support in diabetes-self management were discussed.    Pt verbalized understanding of concepts discussed and recommendations provided today.       Education Materials Provided:  Sharps handout and Novolog handout    PLAN:  See Patient Instructions for co-developed, patient-stated behavior change goals.  Meal Plan Recommendation: eat 3 meals a day and have small snacks between meals, if needed.   Do not skip meals.   Check blood sugars before meals and bedtime and encouraged him to try checking an overnight BG a few times if able.   Keep a blood glucose record for next visit.  Make sure to store extra insulin in the fridge. Only keep the pen you are using at room temp. (Will alert MD as pt needs more pens ordered to  as he has no refills)   Pt to take insulin per sliding scale provided by hospital and instructed him to do this 4 x/day.  As he did not bring any blood sugars in today for me to review hesitate to make any changes to his prescribed insulin schedule.  Instructed him to bring logbook at next visit so we can see how this schedule works when he actually takes insulin 4x/day instead of just 2x/day.     --> If pre-meal -239 take 1 unit     240-339 take 2 units     340 or greater take 3 units    --> Check your blood sugar before bedtime and if:    200-299 give 1 unit    300-399 take 2 units    400 or greater take 3 units  Recommend that patient begin basal insulin if fasting numbers continue to be elevated when he takes his correction insulin 4x/day. Consider starting dose of 0.2 u/d/kg and split between meals and basal (8 u long acting and 3 u per meal) if needed.   AVS printed and provided to patient today.    FOLLOW-UP:  Follow-up appointment scheduled on Feb 17, 2017 (first available date pt can make it to clinic that the  clinic has openings as well).  Education topics to cover at the next diabetes education visit(s): hypoglycemia signs/sx/treatment, review blood sugars/titrate insulin if needed, review diet and provide diet education with diabetes  Chart routed to referring provider.    Ongoing plan for education and support: Written resources (magazines, books, etc.) and Follow-up visit with diabetes educator in 1 week and follow up PCP on Feb 20, 2017.    Kateryna Osorio RD ([place]) LD CDE    Time Spent: 30 minutes  Encounter Type: Individual    Any diabetes medication dose changes were made via the CDE Protocol and Collaborative Practice Agreement with the patient's referring provider. A copy of this encounter was shared with the provider.

## 2017-02-06 NOTE — PATIENT INSTRUCTIONS
My Diabetes Care Goals:    Check blood sugars 4 times per day before meals and before bed.  Give insulin before each meal and bed depending on blood sugar reading. Follow your current scale.     Write down blood sugars and bring to next appointment please.     If possible, please check a couple overnight blood sugars and bring to next visit.       Follow up:  Follow-up diabetes education appointment scheduled on Feb 17 @ 9:30 am.     Call ([phone]), e-mail [email or web address]), or send [employer] message with questions, concerns or if follow-up is needed.     Bring blood glucose meter and logbook with you to all doctor and follow-up appointments.     [place] [facility] for the [facility]:  For Your Diabetes Education and Nutrition Appointments Call:  [phone]   For Diabetes Education or Nutrition Related Questions:   Phone: [phone]  E-mail: [email or web address]  Fax: [phone]   If you need a medication refill please contact your pharmacy. Please allow 3 business days for your refills to be completed.    Instructions for emailing the Diabetes Educators    If you need to communicate a non-urgent message to a Diabetes Educator via email, please send to [email or web address].    Please follow the following email guidelines:    Subject line: Secure: your clinic name (example: Secure: [facility])  In the email please include: First name, middle initial, last name and date of birth.    We will be in touch with you within one (1) business day.

## 2017-02-06 NOTE — MR AVS SNAPSHOT
After Visit Summary   2/6/2017    Roc Parkinson    MRN: [id number]           Patient Information     Date Of Birth          7/7/1926        Visit Information        Provider Department      2/6/2017 10:30 AM  DIABETIC ED RESOURCE [facility]        Today's Diagnoses     Type 2 diabetes mellitus with stage 3 chronic kidney disease, with long-term current use of insulin (H)    -  1       Care Instructions    My Diabetes Care Goals:    Check blood sugars 4 times per day before meals and before bed.  Give insulin before each meal and bed depending on blood sugar reading. Follow your current scale.     Write down blood sugars and bring to next appointment please.     If possible, please check a couple overnight blood sugars and bring to next visit.       Follow up:  Follow-up diabetes education appointment scheduled on Feb 17 @ 9:30 am.     Call ([phone]), e-mail [email or web address]), or send [email or web address] message with questions, concerns or if follow-up is needed.     Bring blood glucose meter and logbook with you to all doctor and follow-up appointments.     [place] [facility] for the [facility]:  For Your Diabetes Education and Nutrition Appointments Call:  [phone]   For Diabetes Education or Nutrition Related Questions:   Phone: [phone]  E-mail: [email or web address]  Fax: [phone]   If you need a medication refill please contact your pharmacy. Please allow 3 business days for your refills to be completed.    Instructions for emailing the Diabetes Educators    If you need to communicate a non-urgent message to a Diabetes Educator via email, please send to [email or web address].    Please follow the following email guidelines:    Subject line: Secure: your clinic name (example: Secure: [facility])  In the email please include: First name, middle initial, last name and date of birth.    We will be in touch with you  within one (1) business day.           Follow-ups after your visit        Your next 10 appointments already scheduled     Feb 08, 2017  1:00 PM   Level 1 with NORTH CHAIR 10   [facility] ([facility])    [facility] [place]  [address]  [place] [address]   [phone]            Feb 15, 2017  9:00 AM   Level 4 with NORTH CHAIR 4   [facility] ([facility])    [facility] [place] [address]  [place] [address]   [phone]            Feb 15, 2017  9:45 AM   Return Visit with Gretel Quinn MD   [facility] ([facility])    [facility] [place]  [address]  [place] [address]   [phone]            Feb 17, 2017  9:30 AM   Diabetic Education with  DIABETIC ED RESOURCE   [facility]    [address] [place] [id number] [phone]            Feb 20, 2017 10:30 AM   SHORT with Dickson Reich MD   [facility]    [address] [place] [address]   [phone]            Feb 22, 2017 10:30 AM   Level 4 with NORTH CHAIR 4   [facility] ([facility])    [facility] [place]  [address]  [place] [address]   [phone]            Mar 01, 2017 10:30 AM   Level 4 with NORTH CHAIR 4   [facility] ([facility])    [facility] [place]  [address]  [place] [address]   [phone]              Who to contact     If you have questions or need follow up information about today's clinic visit or your schedule please contact [facility] directly at [phone].  Normal  "or non-critical lab and imaging results will be communicated to you by MyChart, letter or phone within 4 business days after the clinic has received the results. If you do not hear from us within 7 days, please contact the clinic through [facility] or phone. If you have a critical or abnormal lab result, we will notify you by phone as soon as possible.  Submit refill requests through [employer]ODIL or call your pharmacy and they will forward the refill request to us. Please allow 3 business days for your refill to be completed.          Additional Information About Your Visit        [employer] Information     YODIL lets you send messages to your doctor, view your test results, renew your prescriptions, schedule appointments and more. To sign up, go to [email or web address] . Click on \"Log in\" on the left side of the screen, which will take you to the Welcome page. Then click on \"Sign up Now\" on the right side of the page.     You will be asked to enter the access code listed below, as well as some personal information. Please follow the directions to create your username and password.     Your access code is: [email or web address]  Expires: 3/28/2017 12:15 PM     Your access code will  in 90 days. If you need help or a new code, please call your [facility] clinic or [phone].        Care EveryWhere ID     This is your Care EveryWhere ID. This could be used by other organizations to access your [facility] medical records  [id number]         Blood Pressure from Last 3 Encounters:   17 158/71   17 134/60   17 159/80    Weight from Last 3 Encounters:   17 87.726 kg (193 lb 6.4 oz)   17 87.091 kg (192 lb)   17 85.911 kg (189 lb 6.4 oz)              Today, you had the following     No orders found for display       Primary Care Provider Office Phone # Fax #    Dickson Reich [phone]       [facility] [address]  [facility] [address]        Thank " you!     Thank you for choosing [facility]  for your care. Our goal is always to provide you with excellent care. Hearing back from our patients is one way we can continue to improve our services. Please take a few minutes to complete the written survey that you may receive in the mail after your visit with us. Thank you!             Your Updated Medication List - Protect others around you: Learn how to safely use, store and throw away your medicines at www.disposemymeds.org.          This list is accurate as of: 2/6/17 11:05 AM.  Always use your most recent med list.                   Brand Name Dispense Instructions for use    acyclovir 400 MG tablet    ZOVIRAX    60 tablet    Take 1 tablet (400 mg) by mouth 2 times daily Viral Prophylaxis.       amLODIPine 5 MG tablet    NORVASC    30 tablet    Take 1 tablet (5 mg) by mouth daily       clotrimazole-betamethasone cream    LOTRISONE    30 g    Apply topically to the head of the penis two times a day for up to 2 weeks.       dexamethasone 4 MG tablet    DECADRON    20 tablet    Take 20 mg weekly       ferrous sulfate 325 (65 FE) MG tablet    IRON     Take 325 mg by mouth daily (with breakfast)       FREESTYLE LITE test strip   Generic drug:  blood glucose monitoring     200 each    USE TO TEST BLOOD GLUCOSE TWICE DAILY       gemfibrozil 600 MG tablet    LOPID    180 tablet    TAKE 1 TABLET BY MOUTH TWICE DAILY       glipiZIDE 10 MG tablet    GLUCOTROL    180 tablet    TAKE 1 TABLET BY MOUTH TWICE DAILY BEFORE A MEAL       * insulin aspart 100 UNIT/ML injection    NovoLOG PEN    9 mL    Inject 1-7 Units Subcutaneous 3 times daily (before meals)       * insulin aspart 100 UNIT/ML injection    NovoLOG PEN     Inject 1-5 Units Subcutaneous At Bedtime       insulin pen needle 31G X 6 MM     100 each    Use 2 x daily or as directed.       levothyroxine 25 MCG tablet    SYNTHROID/LEVOTHROID    90 tablet    TAKE 1 TABLET BY MOUTH EVERY DAY        lisinopril 30 MG tablet    PRINIVIL,ZESTRIL    90 tablet    TAKE 1 TABLET BY MOUTH EVERY DAY       metoprolol 25 MG tablet    LOPRESSOR    180 tablet    TAKE 1 TABLET BY MOUTH TWICE DAILY       omeprazole 20 MG CR capsule    priLOSEC    90 capsule    TAKE 1 CAPSULE BY MOUTH EVERY DAY       ondansetron 8 MG tablet    ZOFRAN    10 tablet    Take 1 tablet (8 mg) by mouth every 8 hours as needed (Nausea/Vomiting)       polyethylene glycol Packet    MIRALAX/GLYCOLAX     Take 17 g by mouth daily       prochlorperazine 5 MG tablet    COMPAZINE    30 tablet    Take 1 tablet (5 mg) by mouth every 6 hours as needed (Nausea/Vomiting)       Selenium 200 MCG Tabs      Take 100 mcg by mouth daily. Pt takes one half tab of the 200 mcg daily       tamsulosin 0.4 MG capsule    FLOMAX    90 capsule    TAKE 1 CAPSULE BY MOUTH DAILY       VITAMIN C PO      Take 1,000 mg by mouth daily       VITAMIN D3 PO      Take 1,000 Units by mouth daily       * Notice:  This list has 2 medication(s) that are the same as other medications prescribed for you. Read the directions carefully, and ask your doctor or other care provider to review them with you.

## 2017-02-08 ENCOUNTER — INFUSION THERAPY VISIT (OUTPATIENT)
Dept: INFUSION THERAPY | Facility: CLINIC | Age: 82
End: 2017-02-08
Attending: INTERNAL MEDICINE
Payer: MEDICARE

## 2017-02-08 ENCOUNTER — HOSPITAL ENCOUNTER (OUTPATIENT)
Facility: CLINIC | Age: 82
Setting detail: SPECIMEN
Discharge: HOME | End: 2017-02-08
Attending: INTERNAL MEDICINE | Admitting: INTERNAL MEDICINE
Payer: MEDICARE

## 2017-02-08 VITALS
RESPIRATION RATE: 16 BRPM | DIASTOLIC BLOOD PRESSURE: 65 MMHG | HEIGHT: 70 IN | WEIGHT: 193.6 LBS | HEART RATE: 70 BPM | SYSTOLIC BLOOD PRESSURE: 138 MMHG | BODY MASS INDEX: 27.72 KG/M2 | TEMPERATURE: 97.6 F

## 2017-02-08 DIAGNOSIS — C90.00 MULTIPLE MYELOMA NOT HAVING ACHIEVED REMISSION (H): Primary | ICD-10-CM

## 2017-02-08 DIAGNOSIS — D53.9 MACROCYTIC ANEMIA: ICD-10-CM

## 2017-02-08 LAB
ABO + RH BLD: NORMAL
ABO + RH BLD: NORMAL
ALBUMIN SERPL-MCNC: 2.6 G/DL (ref 3.4–5)
ALP SERPL-CCNC: 76 U/L (ref 40–150)
ALT SERPL W P-5'-P-CCNC: 22 U/L (ref 0–70)
ANION GAP SERPL CALCULATED.3IONS-SCNC: 9 MMOL/L (ref 3–14)
AST SERPL W P-5'-P-CCNC: 16 U/L (ref 0–45)
BASOPHILS # BLD AUTO: 0 10E9/L (ref 0–0.2)
BASOPHILS NFR BLD AUTO: 0 %
BILIRUB SERPL-MCNC: 0.4 MG/DL (ref 0.2–1.3)
BLD GP AB SCN SERPL QL: NORMAL
BLD PROD TYP BPU: NORMAL
BLOOD BANK CMNT PATIENT-IMP: NORMAL
BUN SERPL-MCNC: 33 MG/DL (ref 7–30)
CALCIUM SERPL-MCNC: 9.2 MG/DL (ref 8.5–10.1)
CHLORIDE SERPL-SCNC: 107 MMOL/L (ref 94–109)
CO2 SERPL-SCNC: 19 MMOL/L (ref 20–32)
CREAT SERPL-MCNC: 1.26 MG/DL (ref 0.66–1.25)
DIFFERENTIAL METHOD BLD: ABNORMAL
EOSINOPHIL # BLD AUTO: 0 10E9/L (ref 0–0.7)
EOSINOPHIL NFR BLD AUTO: 0.4 %
ERYTHROCYTE [DISTWIDTH] IN BLOOD BY AUTOMATED COUNT: 24.2 % (ref 10–15)
GFR SERPL CREATININE-BSD FRML MDRD: 54 ML/MIN/1.7M2
GLUCOSE SERPL-MCNC: 247 MG/DL (ref 70–99)
HCT VFR BLD AUTO: 22.3 % (ref 40–53)
HGB BLD-MCNC: 7.5 G/DL (ref 13.3–17.7)
IMM GRANULOCYTES # BLD: 0 10E9/L (ref 0–0.4)
IMM GRANULOCYTES NFR BLD: 0.4 %
LYMPHOCYTES # BLD AUTO: 0.7 10E9/L (ref 0.8–5.3)
LYMPHOCYTES NFR BLD AUTO: 28 %
MCH RBC QN AUTO: 33.8 PG (ref 26.5–33)
MCHC RBC AUTO-ENTMCNC: 33.6 G/DL (ref 31.5–36.5)
MCV RBC AUTO: 101 FL (ref 78–100)
MONOCYTES # BLD AUTO: 0.2 10E9/L (ref 0–1.3)
MONOCYTES NFR BLD AUTO: 7.1 %
NEUTROPHILS # BLD AUTO: 1.5 10E9/L (ref 1.6–8.3)
NEUTROPHILS NFR BLD AUTO: 64.1 %
NUM BPU REQUESTED: 1
PLATELET # BLD AUTO: 53 10E9/L (ref 150–450)
POTASSIUM SERPL-SCNC: 5 MMOL/L (ref 3.4–5.3)
PROT SERPL-MCNC: 9.7 G/DL (ref 6.8–8.8)
RBC # BLD AUTO: 2.22 10E12/L (ref 4.4–5.9)
SODIUM SERPL-SCNC: 135 MMOL/L (ref 133–144)
SPECIMEN EXP DATE BLD: NORMAL
WBC # BLD AUTO: 2.4 10E9/L (ref 4–11)

## 2017-02-08 PROCEDURE — 36415 COLL VENOUS BLD VENIPUNCTURE: CPT

## 2017-02-08 PROCEDURE — 83883 ASSAY NEPHELOMETRY NOT SPEC: CPT | Performed by: INTERNAL MEDICINE

## 2017-02-08 PROCEDURE — 82784 ASSAY IGA/IGD/IGG/IGM EACH: CPT | Performed by: INTERNAL MEDICINE

## 2017-02-08 PROCEDURE — 86923 COMPATIBILITY TEST ELECTRIC: CPT | Performed by: INTERNAL MEDICINE

## 2017-02-08 PROCEDURE — 86901 BLOOD TYPING SEROLOGIC RH(D): CPT | Performed by: INTERNAL MEDICINE

## 2017-02-08 PROCEDURE — 00000402 ZZHCL STATISTIC TOTAL PROTEIN: Performed by: INTERNAL MEDICINE

## 2017-02-08 PROCEDURE — 80053 COMPREHEN METABOLIC PANEL: CPT | Performed by: INTERNAL MEDICINE

## 2017-02-08 PROCEDURE — 85025 COMPLETE CBC W/AUTO DIFF WBC: CPT | Performed by: INTERNAL MEDICINE

## 2017-02-08 PROCEDURE — 86334 IMMUNOFIX E-PHORESIS SERUM: CPT | Performed by: INTERNAL MEDICINE

## 2017-02-08 PROCEDURE — 86850 RBC ANTIBODY SCREEN: CPT | Performed by: INTERNAL MEDICINE

## 2017-02-08 PROCEDURE — 84165 PROTEIN E-PHORESIS SERUM: CPT | Performed by: INTERNAL MEDICINE

## 2017-02-08 PROCEDURE — 86900 BLOOD TYPING SEROLOGIC ABO: CPT | Performed by: INTERNAL MEDICINE

## 2017-02-08 PROCEDURE — 25000128 H RX IP 250 OP 636: Mod: JW | Performed by: INTERNAL MEDICINE

## 2017-02-08 PROCEDURE — 96401 CHEMO ANTI-NEOPL SQ/IM: CPT

## 2017-02-08 RX ADMIN — BORTEZOMIB 2.1 MG: 3.5 INJECTION, POWDER, LYOPHILIZED, FOR SOLUTION INTRAVENOUS; SUBCUTANEOUS at 14:32

## 2017-02-08 NOTE — PROGRESS NOTES
Infusion Nursing Note:  Roc Parkinson presents today for C2D22 Velcade.    Patient seen by provider today: No   present during visit today: Not Applicable.    Note: Patient will return tomorrow to receive 1 unit pRBC's for hgb 7.5.    Intravenous Access:  Lab draw site right ac, Needle type BF, Gauge 23.  Labs drawn without difficulty.    Treatment Conditions:  HGB      7.5   2/8/2017  WBC      2.4   2/8/2017   ANEU      1.5   2/8/2017  PLT       53   2/8/2017     NA      135   2/8/2017                POTASSIUM      5.0   2/8/2017        No results found for this basename: mag         CR     1.26   2/8/2017                MICHELLE      9.2   2/8/2017             BILITOTAL      0.4   2/8/2017        ALBUMIN      2.6   2/8/2017                 ALT       22   2/8/2017        AST       16   2/8/2017  Results reviewed, labs MET treatment parameters, ok to proceed with treatment.  Ok for chemo today with platelet count of 53,000 per Dr. Quinn.      Post Infusion Assessment:  Patient tolerated injection without incident.  Site patent and intact, free from redness, edema or discomfort.  No evidence of extravasations.  Access discontinued per protocol.    Discharge Plan:   Discharge instructions reviewed with: Patient.  Patient and/or family verbalized understanding of discharge instructions and all questions answered.  Copy of AVS reviewed with patient and/or family.  Patient will return 2/9/17 for next appointment.  Patient discharged in stable condition accompanied by: self.  Departure Mode: Ambulatory.    Alberto Nash RN

## 2017-02-08 NOTE — MR AVS SNAPSHOT
After Visit Summary   2/8/2017    Roc Parkinson    MRN: [id number]           Patient Information     Date Of Birth          7/7/1926        Visit Information        Provider Department      2/8/2017 1:00 PM NORTH CHAIR 10 [facility]        Today's Diagnoses     Multiple myeloma not having achieved remission (H)    -  1     Macrocytic anemia            Follow-ups after your visit        Your next 10 appointments already scheduled     Feb 09, 2017 11:00 AM   Level 3 with NORTH CHAIR 3   [facility] ([facility])    [facility]  [address]  [place] [address]   [phone]            Feb 15, 2017  9:00 AM   Level 4 with NORTH CHAIR 4   [facility] ([facility])    [facility] [place]  [address]  [place] [address]   [phone]            Feb 15, 2017  9:45 AM   Return Visit with Gretel Quinn MD   [facility] ([facility])    [facility] [place]  [address]  [place] [address]   [phone]            Feb 17, 2017  9:30 AM   Diabetic Education with  DIABETIC ED RESOURCE   [facility]    [address] [place] [id number] [phone]            Feb 20, 2017 10:30 AM   SHORT with Dickson Reich MD   [facility]    [address] [place] [address]   [phone]            Feb 22, 2017 10:30 AM   Level 4 with NORTH CHAIR 4   [facility] ([facility])    [facility]  [address]  [place] [address]   [phone]            Mar 01, 2017 10:30 AM   Level 4 with NORTH CHAIR 4   [facility]  "Clinic and Infusion Center ([facility])    [facility]  [address]  [place] [address] [phone]              Future tests that were ordered for you today     Open Standing Orders        Priority Remaining Interval Expires Ordered    Red blood cell prepare order unit Routine 99/100 CONDITIONAL (SPECIFY) BLOOD  2017            Who to contact     If you have questions or need follow up information about today's clinic visit or your schedule please contact [facility] directly at [phone].  Normal or non-critical lab and imaging results will be communicated to you by [employer], letter or phone within 4 business days after the clinic has received the results. If you do not hear from us within 7 days, please contact the clinic through [employer] or phone. If you have a critical or abnormal lab result, we will notify you by phone as soon as possible.  Submit refill requests through [employer] or call your pharmacy and they will forward the refill request to us. Please allow 3 business days for your refill to be completed.          Additional Information About Your Visit        MyChart Information     [employer]o lets you send messages to your doctor, view your test results, renew your prescriptions, schedule appointments and more. To sign up, go to [email or web address] . Click on \"Log in\" on the left side of the screen, which will take you to the Welcome page. Then click on \"Sign up Now\" on the right side of the page.     You will be asked to enter the access code listed below, as well as some personal information. Please follow the directions to create your username and password.     Your access code is: [email or web address]  Expires: 3/28/2017 12:15 PM     Your access code will  in 90 days. If you need help or a new code, please call your [place] clinic or [phone].        Care EveryWhere ID     This is your Care EveryWhere ID. " "This could be used by other organizations to access your [place] medical records  [id number]        Your Vitals Were     Pulse Temperature Respirations Height BMI (Body Mass Index)       70 97.6  F (36.4  C) (Oral) 16 1.778 m (5' 10\") 27.78 kg/m2        Blood Pressure from Last 3 Encounters:   02/08/17 138/65   02/01/17 158/71   01/30/17 134/60    Weight from Last 3 Encounters:   02/08/17 87.816 kg (193 lb 9.6 oz)   02/01/17 87.726 kg (193 lb 6.4 oz)   01/30/17 87.091 kg (192 lb)              We Performed the Following     ABO/Rh type and screen     CBC with platelets differential     Comprehensive metabolic panel     Kappa and lambda light chain     Protein electrophoresis     Protein Immunofixation Serum     Red blood cell prepare order unit        Primary Care Provider Office Phone # Fax #    Dickson Reich [phone]       [facility] [address] [facility] [address]        Thank you!     Thank you for choosing [facility] CANCER [facility] AND [facility] C[facility]  for your care. Our goal is always to provide you with excellent care. Hearing back from our patients is one way we can continue to improve our services. Please take a few minutes to complete the written survey that you may receive in the mail after your visit with us. Thank you!             Your Updated Medication List - Protect others around you: Learn how to safely use, store and throw away your medicines at www.disposemymeds.org.          This list is accurate as of: 2/8/17  1:56 PM.  Always use your most recent med list.                   Brand Name Dispense Instructions for use    acyclovir 400 MG tablet    ZOVIRAX    60 tablet    Take 1 tablet (400 mg) by mouth 2 times daily Viral Prophylaxis.       amLODIPine 5 MG tablet    NORVASC    30 tablet    Take 1 tablet (5 mg) by mouth daily       clotrimazole-betamethasone cream    LOTRISONE    30 g    Apply topically to the head of the penis two times a day for " up to 2 weeks.       dexamethasone 4 MG tablet    DECADRON    20 tablet    Take 20 mg weekly       ferrous sulfate 325 (65 FE) MG tablet    IRON     Take 325 mg by mouth daily (with breakfast)       FREESTYLE LITE test strip   Generic drug:  blood glucose monitoring     200 each    USE TO TEST BLOOD GLUCOSE TWICE DAILY       gemfibrozil 600 MG tablet    LOPID    180 tablet    TAKE 1 TABLET BY MOUTH TWICE DAILY       glipiZIDE 10 MG tablet    GLUCOTROL    180 tablet    TAKE 1 TABLET BY MOUTH TWICE DAILY BEFORE A MEAL       * insulin aspart 100 UNIT/ML injection    NovoLOG PEN    9 mL    Inject 1-7 Units Subcutaneous 3 times daily (before meals)       * insulin aspart 100 UNIT/ML injection    NovoLOG PEN    12 mL    Inject 1-5 Units Subcutaneous 4 times daily (with meals and nightly)       insulin pen needle 31G X 6 MM     100 each    Use 2 x daily or as directed.       levothyroxine 25 MCG tablet    SYNTHROID/LEVOTHROID    90 tablet    TAKE 1 TABLET BY MOUTH EVERY DAY       lisinopril 30 MG tablet    PRINIVIL,ZESTRIL    90 tablet    TAKE 1 TABLET BY MOUTH EVERY DAY       metoprolol 25 MG tablet    LOPRESSOR    180 tablet    TAKE 1 TABLET BY MOUTH TWICE DAILY       omeprazole 20 MG CR capsule    priLOSEC    90 capsule    TAKE 1 CAPSULE BY MOUTH EVERY DAY       ondansetron 8 MG tablet    ZOFRAN    10 tablet    Take 1 tablet (8 mg) by mouth every 8 hours as needed (Nausea/Vomiting)       polyethylene glycol Packet    MIRALAX/GLYCOLAX     Take 17 g by mouth daily       prochlorperazine 5 MG tablet    COMPAZINE    30 tablet    Take 1 tablet (5 mg) by mouth every 6 hours as needed (Nausea/Vomiting)       Selenium 200 MCG Tabs      Take 100 mcg by mouth daily. Pt takes one half tab of the 200 mcg daily       tamsulosin 0.4 MG capsule    FLOMAX    90 capsule    TAKE 1 CAPSULE BY MOUTH DAILY       VITAMIN C PO      Take 1,000 mg by mouth daily       VITAMIN D3 PO      Take 1,000 Units by mouth daily       * Notice:  This list  has 2 medication(s) that are the same as other medications prescribed for you. Read the directions carefully, and ask your doctor or other care provider to review them with you.

## 2017-02-09 ENCOUNTER — HOSPITAL ENCOUNTER (OUTPATIENT)
Facility: CLINIC | Age: 82
Setting detail: SPECIMEN
End: 2017-02-09
Attending: INTERNAL MEDICINE
Payer: MEDICARE

## 2017-02-09 ENCOUNTER — INFUSION THERAPY VISIT (OUTPATIENT)
Dept: INFUSION THERAPY | Facility: CLINIC | Age: 82
End: 2017-02-09
Attending: FAMILY MEDICINE
Payer: MEDICARE

## 2017-02-09 VITALS
DIASTOLIC BLOOD PRESSURE: 83 MMHG | SYSTOLIC BLOOD PRESSURE: 154 MMHG | TEMPERATURE: 98.6 F | OXYGEN SATURATION: 97 % | RESPIRATION RATE: 16 BRPM | HEART RATE: 67 BPM

## 2017-02-09 DIAGNOSIS — D53.9 MACROCYTIC ANEMIA: Primary | ICD-10-CM

## 2017-02-09 LAB
ALBUMIN SERPL ELPH-MCNC: 3.7 G/DL (ref 3.7–5.1)
ALPHA1 GLOB SERPL ELPH-MCNC: 0.3 G/DL (ref 0.2–0.4)
ALPHA2 GLOB SERPL ELPH-MCNC: 0.7 G/DL (ref 0.5–0.9)
B-GLOBULIN SERPL ELPH-MCNC: 0.8 G/DL (ref 0.6–1)
BLD PROD TYP BPU: NORMAL
BLD UNIT ID BPU: 0
BLOOD PRODUCT CODE: NORMAL
BPU ID: NORMAL
GAMMA GLOB SERPL ELPH-MCNC: 3.9 G/DL (ref 0.7–1.6)
IGA SERPL-MCNC: 26 MG/DL (ref 70–380)
IGG SERPL-MCNC: 378 MG/DL (ref 695–1620)
IGM SERPL-MCNC: 6470 MG/DL (ref 60–265)
IMMUNOFIXATION ELP: ABNORMAL
KAPPA LC UR-MCNC: 178.75 MG/DL (ref 0.33–1.94)
KAPPA LC/LAMBDA SER: 156.8 {RATIO} (ref 0.26–1.65)
LAMBDA LC SERPL-MCNC: 1.14 MG/DL (ref 0.57–2.63)
M PROTEIN SERPL ELPH-MCNC: 3.4 G/DL
PROT PATTERN SERPL ELPH-IMP: ABNORMAL
TRANSFUSION STATUS PATIENT QL: NORMAL
TRANSFUSION STATUS PATIENT QL: NORMAL

## 2017-02-09 PROCEDURE — P9016 RBC LEUKOCYTES REDUCED: HCPCS | Performed by: INTERNAL MEDICINE

## 2017-02-09 PROCEDURE — 36430 TRANSFUSION BLD/BLD COMPNT: CPT

## 2017-02-09 ASSESSMENT — PAIN SCALES - GENERAL: PAINLEVEL: NO PAIN (0)

## 2017-02-09 NOTE — PROGRESS NOTES
Infusion Nursing Note:  Roc Parkinson presents today for 1 unit PRBCs.    Patient seen by provider today: No   present during visit today: Not Applicable.    Note: N/A.    Intravenous Access:  Peripheral IV placed.    Treatment Conditions:  HGB      7.5   2/8/2017  WBC      2.4   2/8/2017   ANEU      1.5   2/8/2017  PLT       53   2/8/2017     Blood transfusion consent signed 12/12/16.      Post Infusion Assessment:  Patient tolerated infusion without incident.  Blood return noted pre and post infusion.  Site patent and intact, free from redness, edema or discomfort.  No evidence of extravasations.  Access discontinued per protocol.    Discharge Plan:   Patient declined prescription refills.  Discharge instructions reviewed with: Patient.  Patient and/or family verbalized understanding of discharge instructions and all questions answered.  Copy of AVS reviewed with patient and/or family.  Patient will return 2/15/17 for next appointment.  Patient discharged in stable condition accompanied by: self.  Departure Mode: Ambulatory.    Doug Rios RN

## 2017-02-09 NOTE — MR AVS SNAPSHOT
After Visit Summary   2/9/2017    Roc Parkinson    MRN: [id number]           Patient Information     Date Of Birth          7/7/1926        Visit Information        Provider Department      2/9/2017 11:00 AM NORTH CHAIR 3 [facility]        Today's Diagnoses     Macrocytic anemia    -  1        Follow-ups after your visit        Your next 10 appointments already scheduled     Feb 15, 2017  9:00 AM   Level 4 with NORTH CHAIR 4   [facility] ([facility])    [facility]  [address]  [place] [address]   [phone]            Feb 15, 2017  9:45 AM   Return Visit with Gretel Quinn MD   [facility] ([facility])    [facility]  [address]  [place] [address]   [phone]            Feb 17, 2017  9:30 AM   Diabetic Education with  DIABETIC ED RESOURCE   [facility]    [address] [place] [address]   [phone]            Feb 20, 2017 10:30 AM   SHORT with Dickson Reich MD   [facility]    [address] [place] [address]   [phone]            Feb 22, 2017 10:30 AM   Level 4 with NORTH CHAIR 4   [facility] ([facility])    [facility]  [address]  [facility] [address]   [phone]            Mar 01, 2017 10:30 AM   Level 4 with NORTH CHAIR 4   [facility] ([facility])    [facility]  [address]  [place] [address]   [phone]              Future tests that were ordered for you today     Open Standing Orders        Priority Remaining Interval Expires Ordered     "Transfuse red blood cell unit Routine 99/100 TRANSFUSE 1 UNIT  2017    CBC with platelets differential Routine 20/ Weekly 2018            Who to contact     If you have questions or need follow up information about today's clinic visit or your schedule please contact [facility] CL[facility]IC [facility]ION CENTER directly at [phone].  Normal or non-critical lab and imaging results will be communicated to you by MyChart, letter or phone within 4 business days after the clinic has received the results. If you do not hear from us within 7 days, please contact the clinic through [employer] or phone. If you have a critical or abnormal lab result, we will notify you by phone as soon as possible.  Submit refill requests through Live[employer] or call your pharmacy and they will forward the refill request to us. Please allow 3 business days for your refill to be completed.          Additional Information About Your Visit        [employer]stand Information     Live[employer] lets you send messages to your doctor, view your test results, renew your prescriptions, schedule appointments and more. To sign up, go to [email or web address] . Click on \"Log in\" on the left side of the screen, which will take you to the Welcome page. Then click on \"Sign up Now\" on the right side of the page.     You will be asked to enter the access code listed below, as well as some personal information. Please follow the directions to create your username and password.     Your access code is: [email or web address]  Expires: 3/28/2017 12:15 PM     Your access code will  in 90 days. If you need help or a new code, please call your [place] clinic or [phone].        Care EveryWhere ID     This is your Care EveryWhere ID. This could be used by other organizations to access your [place] medical records  [id number]        Your Vitals Were     Pulse Temperature Respirations Pulse Oximetry          67 98.6  F (37  C) (Oral) 16 97%         Blood Pressure " from Last 3 Encounters:   02/09/17 154/83   02/08/17 138/65   02/01/17 158/71    Weight from Last 3 Encounters:   02/08/17 87.816 kg (193 lb 9.6 oz)   02/01/17 87.726 kg (193 lb 6.4 oz)   01/30/17 87.091 kg (192 lb)              We Performed the Following     Transfuse red blood cell unit        Primary Care Provider Office Phone # Fax #    Dickson Reich [phone]       [facility] [address]  [facility] [address]        Thank you!     Thank you for choosing [facility]  for your care. Our goal is always to provide you with excellent care. Hearing back from our patients is one way we can continue to improve our services. Please take a few minutes to complete the written survey that you may receive in the mail after your visit with us. Thank you!             Your Updated Medication List - Protect others around you: Learn how to safely use, store and throw away your medicines at www.disposemymeds.org.          This list is accurate as of: 2/9/17  1:16 PM.  Always use your most recent med list.                   Brand Name Dispense Instructions for use    acyclovir 400 MG tablet    ZOVIRAX    60 tablet    Take 1 tablet (400 mg) by mouth 2 times daily Viral Prophylaxis.       amLODIPine 5 MG tablet    NORVASC    30 tablet    Take 1 tablet (5 mg) by mouth daily       clotrimazole-betamethasone cream    LOTRISONE    30 g    Apply topically to the head of the penis two times a day for up to 2 weeks.       dexamethasone 4 MG tablet    DECADRON    20 tablet    Take 20 mg weekly       ferrous sulfate 325 (65 FE) MG tablet    IRON     Take 325 mg by mouth daily (with breakfast)       FREESTYLE LITE test strip   Generic drug:  blood glucose monitoring     200 each    USE TO TEST BLOOD GLUCOSE TWICE DAILY       gemfibrozil 600 MG tablet    LOPID    180 tablet    TAKE 1 TABLET BY MOUTH TWICE DAILY       glipiZIDE 10 MG tablet    GLUCOTROL    180  tablet    TAKE 1 TABLET BY MOUTH TWICE DAILY BEFORE A MEAL       * insulin aspart 100 UNIT/ML injection    NovoLOG PEN    9 mL    Inject 1-7 Units Subcutaneous 3 times daily (before meals)       * insulin aspart 100 UNIT/ML injection    NovoLOG PEN    12 mL    Inject 1-5 Units Subcutaneous 4 times daily (with meals and nightly)       insulin pen needle 31G X 6 MM     100 each    Use 2 x daily or as directed.       levothyroxine 25 MCG tablet    SYNTHROID/LEVOTHROID    90 tablet    TAKE 1 TABLET BY MOUTH EVERY DAY       lisinopril 30 MG tablet    PRINIVIL,ZESTRIL    90 tablet    TAKE 1 TABLET BY MOUTH EVERY DAY       metoprolol 25 MG tablet    LOPRESSOR    180 tablet    TAKE 1 TABLET BY MOUTH TWICE DAILY       omeprazole 20 MG CR capsule    priLOSEC    90 capsule    TAKE 1 CAPSULE BY MOUTH EVERY DAY       ondansetron 8 MG tablet    ZOFRAN    10 tablet    Take 1 tablet (8 mg) by mouth every 8 hours as needed (Nausea/Vomiting)       polyethylene glycol Packet    MIRALAX/GLYCOLAX     Take 17 g by mouth daily       prochlorperazine 5 MG tablet    COMPAZINE    30 tablet    Take 1 tablet (5 mg) by mouth every 6 hours as needed (Nausea/Vomiting)       Selenium 200 MCG Tabs      Take 100 mcg by mouth daily. Pt takes one half tab of the 200 mcg daily       tamsulosin 0.4 MG capsule    FLOMAX    90 capsule    TAKE 1 CAPSULE BY MOUTH DAILY       VITAMIN C PO      Take 1,000 mg by mouth daily       VITAMIN D3 PO      Take 1,000 Units by mouth daily       * Notice:  This list has 2 medication(s) that are the same as other medications prescribed for you. Read the directions carefully, and ask your doctor or other care provider to review them with you.

## 2017-02-13 ENCOUNTER — TELEPHONE (OUTPATIENT)
Dept: ONCOLOGY | Facility: CLINIC | Age: 82
End: 2017-02-13

## 2017-02-13 ENCOUNTER — HOSPITAL ENCOUNTER (OUTPATIENT)
Facility: CLINIC | Age: 82
Setting detail: SPECIMEN
End: 2017-02-13
Attending: INTERNAL MEDICINE
Payer: MEDICARE

## 2017-02-13 ENCOUNTER — ONCOLOGY VISIT (OUTPATIENT)
Dept: ONCOLOGY | Facility: CLINIC | Age: 82
End: 2017-02-13
Attending: INTERNAL MEDICINE
Payer: COMMERCIAL

## 2017-02-13 ENCOUNTER — HOSPITAL ENCOUNTER (OUTPATIENT)
Facility: CLINIC | Age: 82
Setting detail: SPECIMEN
Discharge: HOME | End: 2017-02-13
Attending: INTERNAL MEDICINE | Admitting: INTERNAL MEDICINE
Payer: MEDICARE

## 2017-02-13 VITALS
TEMPERATURE: 98.4 F | BODY MASS INDEX: 28.12 KG/M2 | RESPIRATION RATE: 16 BRPM | DIASTOLIC BLOOD PRESSURE: 61 MMHG | HEART RATE: 76 BPM | OXYGEN SATURATION: 97 % | WEIGHT: 196 LBS | SYSTOLIC BLOOD PRESSURE: 117 MMHG

## 2017-02-13 DIAGNOSIS — C67.9 MALIGNANT NEOPLASM OF URINARY BLADDER, UNSPECIFIED SITE (H): ICD-10-CM

## 2017-02-13 DIAGNOSIS — C90.00 MULTIPLE MYELOMA NOT HAVING ACHIEVED REMISSION (H): Primary | ICD-10-CM

## 2017-02-13 DIAGNOSIS — R31.9 HEMATURIA: ICD-10-CM

## 2017-02-13 DIAGNOSIS — I10 ESSENTIAL HYPERTENSION, BENIGN: ICD-10-CM

## 2017-02-13 DIAGNOSIS — N39.0 URINARY TRACT INFECTION, SITE UNSPECIFIED: ICD-10-CM

## 2017-02-13 LAB
ALBUMIN UR-MCNC: 30 MG/DL
ANION GAP SERPL CALCULATED.3IONS-SCNC: 9 MMOL/L (ref 3–14)
APPEARANCE UR: CLEAR
BACTERIA #/AREA URNS HPF: ABNORMAL /HPF
BASOPHILS # BLD AUTO: 0 10E9/L (ref 0–0.2)
BASOPHILS NFR BLD AUTO: 0 %
BILIRUB UR QL STRIP: NEGATIVE
BUN SERPL-MCNC: 35 MG/DL (ref 7–30)
CALCIUM SERPL-MCNC: 9.3 MG/DL (ref 8.5–10.1)
CHLORIDE SERPL-SCNC: 107 MMOL/L (ref 94–109)
CO2 SERPL-SCNC: 19 MMOL/L (ref 20–32)
COLOR UR AUTO: YELLOW
CREAT SERPL-MCNC: 0.95 MG/DL (ref 0.66–1.25)
DIFFERENTIAL METHOD BLD: ABNORMAL
EOSINOPHIL # BLD AUTO: 0 10E9/L (ref 0–0.7)
EOSINOPHIL NFR BLD AUTO: 0.5 %
ERYTHROCYTE [DISTWIDTH] IN BLOOD BY AUTOMATED COUNT: 24.4 % (ref 10–15)
GFR SERPL CREATININE-BSD FRML MDRD: 74 ML/MIN/1.7M2
GLUCOSE SERPL-MCNC: 275 MG/DL (ref 70–99)
GLUCOSE UR STRIP-MCNC: 150 MG/DL
HCT VFR BLD AUTO: 24.1 % (ref 40–53)
HGB BLD-MCNC: 8.2 G/DL (ref 13.3–17.7)
HGB UR QL STRIP: ABNORMAL
IMM GRANULOCYTES # BLD: 0 10E9/L (ref 0–0.4)
IMM GRANULOCYTES NFR BLD: 0.5 %
INR PPP: 1.15 (ref 0.86–1.14)
KETONES UR STRIP-MCNC: NEGATIVE MG/DL
LEUKOCYTE ESTERASE UR QL STRIP: ABNORMAL
LYMPHOCYTES # BLD AUTO: 0.8 10E9/L (ref 0.8–5.3)
LYMPHOCYTES NFR BLD AUTO: 37.4 %
MCH RBC QN AUTO: 33.9 PG (ref 26.5–33)
MCHC RBC AUTO-ENTMCNC: 34 G/DL (ref 31.5–36.5)
MCV RBC AUTO: 100 FL (ref 78–100)
MONOCYTES # BLD AUTO: 0.2 10E9/L (ref 0–1.3)
MONOCYTES NFR BLD AUTO: 7.8 %
NEUTROPHILS # BLD AUTO: 1.2 10E9/L (ref 1.6–8.3)
NEUTROPHILS NFR BLD AUTO: 53.8 %
NITRATE UR QL: NEGATIVE
NRBC # BLD AUTO: 0 10*3/UL
NRBC BLD AUTO-RTO: 0 /100
PH UR STRIP: 5.5 PH (ref 5–7)
PLATELET # BLD AUTO: 40 10E9/L (ref 150–450)
POTASSIUM SERPL-SCNC: 5.6 MMOL/L (ref 3.4–5.3)
RBC # BLD AUTO: 2.42 10E12/L (ref 4.4–5.9)
RBC #/AREA URNS AUTO: >182 /HPF (ref 0–2)
SODIUM SERPL-SCNC: 135 MMOL/L (ref 133–144)
SP GR UR STRIP: 1.01 (ref 1–1.03)
URN SPEC COLLECT METH UR: ABNORMAL
UROBILINOGEN UR STRIP-MCNC: NORMAL MG/DL (ref 0–2)
WBC # BLD AUTO: 2.2 10E9/L (ref 4–11)
WBC #/AREA URNS AUTO: <1 /HPF (ref 0–2)

## 2017-02-13 PROCEDURE — 87088 URINE BACTERIA CULTURE: CPT | Performed by: INTERNAL MEDICINE

## 2017-02-13 PROCEDURE — 99213 OFFICE O/P EST LOW 20 MIN: CPT | Performed by: INTERNAL MEDICINE

## 2017-02-13 PROCEDURE — 85025 COMPLETE CBC W/AUTO DIFF WBC: CPT | Performed by: INTERNAL MEDICINE

## 2017-02-13 PROCEDURE — 81001 URINALYSIS AUTO W/SCOPE: CPT | Performed by: INTERNAL MEDICINE

## 2017-02-13 PROCEDURE — 85610 PROTHROMBIN TIME: CPT | Performed by: INTERNAL MEDICINE

## 2017-02-13 PROCEDURE — 87086 URINE CULTURE/COLONY COUNT: CPT | Performed by: INTERNAL MEDICINE

## 2017-02-13 PROCEDURE — 80048 BASIC METABOLIC PNL TOTAL CA: CPT | Performed by: INTERNAL MEDICINE

## 2017-02-13 PROCEDURE — 87186 SC STD MICRODIL/AGAR DIL: CPT | Performed by: INTERNAL MEDICINE

## 2017-02-13 ASSESSMENT — PAIN SCALES - GENERAL: PAINLEVEL: NO PAIN (0)

## 2017-02-13 NOTE — MR AVS SNAPSHOT
After Visit Summary   2/13/2017    Roc Parkinson    MRN: [id number]           Patient Information     Date Of Birth          7/7/1926        Visit Information        Provider Department      2/13/2017 3:30 PM Pritesh Villaseñor MD [facility]        Today's Diagnoses     Multiple myeloma not having achieved remission (H)    -  1    Malignant neoplasm of urinary bladder, unspecified site (H)        Hematuria          Care Instructions    Labs today.  Urology appointment this week.  FU with Dr. Quinn.        Follow-ups after your visit        Your next 10 appointments already scheduled     Feb 14, 2017 10:00 AM CST   Return Visit with Agustina Mena PA-C   [facility] ([facility])    [address]  [address]  [facility] [address]   [phone]            Feb 15, 2017  9:00 AM CST   Level 4 with NORTH CHAIR 4   [facility] ([facility])    [facility]  [address]  [facility] [address] [phone]            Feb 15, 2017  9:45 AM CST   Return Visit with Gretel Quinn MD   [facility] ([facility])    [facility]  [address]  [facility] [address] [phone]            Feb 17, 2017  9:30 AM CST   Diabetic Education with  DIABETIC ED RESOURCE   [facility] ([facility])    [address] [id number] [phone]            Feb 20, 2017 10:30 AM CST   SHORT with Dickson Reich MD   [facility] ([facility])    [address]  [facility] [address] [phone]            Feb 22, 2017 10:30 AM CST   Level 4 with NORTH CHAIR 4   [facility] ([facility])    [facility]  "[place]  [address]  [place] [address]   [phone]            Mar 01, 2017 10:30 AM CST   Level 4 with NORTH CHAIR 4   [facility] ([facility])    [facility]  [address]  [place] [address]   [phone]              Who to contact     If you have questions or need follow up information about today's clinic visit or your schedule please contact [facility] directly at [phone].  Normal or non-critical lab and imaging results will be communicated to you by [employer], letter or phone within 4 business days after the clinic has received the results. If you do not hear from us within 7 days, please contact the clinic through [employer] or phone. If you have a critical or abnormal lab result, we will notify you by phone as soon as possible.  Submit refill requests through [employer] or call your pharmacy and they will forward the refill request to us. Please allow 3 business days for your refill to be completed.          Additional Information About Your Visit        [employer] Information     [employer] lets you send messages to your doctor, view your test results, renew your prescriptions, schedule appointments and more. To sign up, go to [email or web address] . Click on \"Log in\" on the left side of the screen, which will take you to the Welcome page. Then click on \"Sign up Now\" on the right side of the page.     You will be asked to enter the access code listed below, as well as some personal information. Please follow the directions to create your username and password.     Your access code is: [email or web address]  Expires: 3/28/2017 12:15 PM     Your access code will  in 90 days. If you need help or a new code, please call your [facility] or [phone].        Care EveryWhere ID     This is your Care EveryWhere ID. This could be used by other organizations to access your [place] medical " records  [id number]        Your Vitals Were     Pulse Temperature Respirations Pulse Oximetry BMI (Body Mass Index)       76 98.4  F (36.9  C) (Oral) 16 97% 28.12 kg/m2        Blood Pressure from Last 3 Encounters:   02/13/17 117/61   02/09/17 154/83   02/08/17 138/65    Weight from Last 3 Encounters:   02/13/17 88.9 kg (196 lb)   02/08/17 87.8 kg (193 lb 9.6 oz)   02/01/17 87.7 kg (193 lb 6.4 oz)              We Performed the Following     Basic metabolic panel     CBC with platelets differential     INR     UA with Microscopic reflex to Culture        Primary Care Provider Office Phone # Fax #    Dickson Reich [phone]       [facility] [address]  [facility] [address]        Thank you!     Thank you for choosing [facility]  for your care. Our goal is always to provide you with excellent care. Hearing back from our patients is one way we can continue to improve our services. Please take a few minutes to complete the written survey that you may receive in the mail after your visit with us. Thank you!             Your Updated Medication List - Protect others around you: Learn how to safely use, store and throw away your medicines at www.disposemymeds.org.          This list is accurate as of: 2/13/17  4:09 PM.  Always use your most recent med list.                   Brand Name Dispense Instructions for use    acyclovir 400 MG tablet    ZOVIRAX    60 tablet    Take 1 tablet (400 mg) by mouth 2 times daily Viral Prophylaxis.       amLODIPine 5 MG tablet    NORVASC    30 tablet    Take 1 tablet (5 mg) by mouth daily       clotrimazole-betamethasone cream    LOTRISONE    30 g    Apply topically to the head of the penis two times a day for up to 2 weeks.       dexamethasone 4 MG tablet    DECADRON    20 tablet    Take 20 mg weekly       ferrous sulfate 325 (65 FE) MG tablet    IRON     Take 325 mg by mouth daily (with breakfast)       FREESTYLE LITE test  strip   Generic drug:  blood glucose monitoring     200 each    USE TO TEST BLOOD GLUCOSE TWICE DAILY       gemfibrozil 600 MG tablet    LOPID    180 tablet    TAKE 1 TABLET BY MOUTH TWICE DAILY       glipiZIDE 10 MG tablet    GLUCOTROL    180 tablet    TAKE 1 TABLET BY MOUTH TWICE DAILY BEFORE A MEAL       * insulin aspart 100 UNIT/ML injection    NovoLOG PEN    9 mL    Inject 1-7 Units Subcutaneous 3 times daily (before meals)       * insulin aspart 100 UNIT/ML injection    NovoLOG PEN    12 mL    Inject 1-5 Units Subcutaneous 4 times daily (with meals and nightly)       insulin pen needle 31G X 6 MM     100 each    Use 2 x daily or as directed.       levothyroxine 25 MCG tablet    SYNTHROID/LEVOTHROID    90 tablet    TAKE 1 TABLET BY MOUTH EVERY DAY       lisinopril 30 MG tablet    PRINIVIL,ZESTRIL    90 tablet    TAKE 1 TABLET BY MOUTH EVERY DAY       metoprolol 25 MG tablet    LOPRESSOR    180 tablet    TAKE 1 TABLET BY MOUTH TWICE DAILY       omeprazole 20 MG CR capsule    priLOSEC    90 capsule    TAKE 1 CAPSULE BY MOUTH EVERY DAY       ondansetron 8 MG tablet    ZOFRAN    10 tablet    Take 1 tablet (8 mg) by mouth every 8 hours as needed (Nausea/Vomiting)       polyethylene glycol Packet    MIRALAX/GLYCOLAX     Take 17 g by mouth daily       prochlorperazine 5 MG tablet    COMPAZINE    30 tablet    Take 1 tablet (5 mg) by mouth every 6 hours as needed (Nausea/Vomiting)       Selenium 200 MCG Tabs      Take 100 mcg by mouth daily. Pt takes one half tab of the 200 mcg daily       tamsulosin 0.4 MG capsule    FLOMAX    90 capsule    TAKE 1 CAPSULE BY MOUTH DAILY       VITAMIN C PO      Take 1,000 mg by mouth daily       VITAMIN D3 PO      Take 1,000 Units by mouth daily       * Notice:  This list has 2 medication(s) that are the same as other medications prescribed for you. Read the directions carefully, and ask your doctor or other care provider to review them with you.

## 2017-02-13 NOTE — PROGRESS NOTES
"Roc Parkinson is a 89 year old male who presents for:  Chief Complaint   Patient presents with     Oncology Clinic Visit     Urethral bleeding        Initial Vitals:  There were no vitals taken for this visit. Estimated body mass index is 27.78 kg/(m^2) as calculated from the following:    Height as of 2/8/17: 1.778 m (5' 10\").    Weight as of 2/8/17: 87.8 kg (193 lb 9.6 oz).. There is no height or weight on file to calculate BSA. BP completed using cuff size: regular  Data Unavailable No LMP for male patient. Allergies and medications reviewed.     Medications: Medication refills not needed today.  Pharmacy name entered into [facility]:    [id number] - [facility] [address] AT [facility] [address] [place] [address] PHARMACY [place], [place] - [address]    Comments: Follow up Urethral bleeding    5 minutes for nursing intake (face to face time)   Kiya Drake MA      "

## 2017-02-13 NOTE — TELEPHONE ENCOUNTER
"Pt called reporting that he has been \"bleeding profusely through his penis\".   Pt said that he noticed it at about 2am this am & reports that sometimes his foreskin gets tight & tears so it may be related to that.  On 2-8-17 Pt's Hgb: 7.5  Plts: 53 & this 89y/o Pt is currently getting Velcade & Dex for his Multiple Myeloma.    Pt agreed to come in today to be evaluated by    "

## 2017-02-13 NOTE — NURSING NOTE
DISCHARGE PLAN:  Next appointments: See patient instruction section  Departure Mode: Ambulatory  Accompanied by: Self  10 minutes for nursing discharge (face to face time)   Bere Pace, RN    Labs today.--drawn by ALYSSA Huertas  Urology appointment this week.--scheduled tomorrow with LUIZA MARES with Dr. Quinn.--previously scheduled      2/14/2017 Tue 10:00 AM 10:00 A 20 [facility]O [[id number]] TOPHER RAMOS [[id number]] UMP RETURN [[id number]] blood in urine janice asked him to be worked in       2/15/2017 Wed   9:00 A 240 [facility] [[id number]] NORTH CHAIR 4 [[id number]] LEVEL 4 [666] Labs, Velcade & Possible Transfusion - Dr Quinn at 945       2/15/2017 Wed   9:45 A 15 [facility] [[id number]] ALISON QUINN [[id number]] RETURN [657] Return Multiple Myeloma      S 2/22/2017 Wed  10:30 A 240 [facility] [[id number]] NORTH CHAIR 4 [[id number]] LEVEL 4 [666] Labs, [id number], Zometa & Possible Transfusion      S 3/1/2017 Wed  10:30 A 240 [facility] [[id number]] NORTH CHAIR 4 [[id number]] LEVEL 4 [666] Labs, Velcade & Possible Transfusion

## 2017-02-14 ENCOUNTER — OFFICE VISIT (OUTPATIENT)
Dept: UROLOGY | Facility: CLINIC | Age: 82
End: 2017-02-14
Payer: COMMERCIAL

## 2017-02-14 VITALS — HEART RATE: 64 BPM | DIASTOLIC BLOOD PRESSURE: 64 MMHG | SYSTOLIC BLOOD PRESSURE: 128 MMHG

## 2017-02-14 DIAGNOSIS — N47.1 PHIMOSIS: ICD-10-CM

## 2017-02-14 DIAGNOSIS — Z85.51 PERSONAL HISTORY OF MALIGNANT NEOPLASM OF BLADDER: ICD-10-CM

## 2017-02-14 DIAGNOSIS — R31.0 GROSS HEMATURIA: ICD-10-CM

## 2017-02-14 DIAGNOSIS — N48.89 PENILE BLEEDING: Primary | ICD-10-CM

## 2017-02-14 DIAGNOSIS — Z85.46 PERSONAL HISTORY OF MALIGNANT NEOPLASM OF PROSTATE: ICD-10-CM

## 2017-02-14 DIAGNOSIS — N35.9 URETHRAL STRICTURE, UNSPECIFIED STRICTURE TYPE: ICD-10-CM

## 2017-02-14 DIAGNOSIS — N39.45 URINARY INCONTINENCE WITH CONTINUOUS LEAKAGE: ICD-10-CM

## 2017-02-14 PROBLEM — N39.0 URINARY TRACT INFECTION: Status: ACTIVE | Noted: 2017-02-14

## 2017-02-14 PROCEDURE — 99213 OFFICE O/P EST LOW 20 MIN: CPT | Performed by: PHYSICIAN ASSISTANT

## 2017-02-14 RX ORDER — EPINEPHRINE 0.3 MG/.3ML
0.3 INJECTION SUBCUTANEOUS EVERY 5 MIN PRN
Status: CANCELLED | OUTPATIENT
Start: 2017-03-08

## 2017-02-14 RX ORDER — AMOXICILLIN 500 MG/1
CAPSULE ORAL
COMMUNITY
Start: 2016-10-10 | End: 2017-02-15

## 2017-02-14 RX ORDER — LORAZEPAM 2 MG/ML
0.5 INJECTION INTRAMUSCULAR EVERY 4 HOURS PRN
Status: CANCELLED
Start: 2017-03-01

## 2017-02-14 RX ORDER — MEPERIDINE HYDROCHLORIDE 25 MG/ML
25 INJECTION INTRAMUSCULAR; INTRAVENOUS; SUBCUTANEOUS EVERY 30 MIN PRN
Status: CANCELLED | OUTPATIENT
Start: 2017-03-08

## 2017-02-14 RX ORDER — EPINEPHRINE 0.3 MG/.3ML
0.3 INJECTION SUBCUTANEOUS EVERY 5 MIN PRN
Status: CANCELLED | OUTPATIENT
Start: 2017-03-01

## 2017-02-14 RX ORDER — ALBUTEROL SULFATE 0.83 MG/ML
2.5 SOLUTION RESPIRATORY (INHALATION)
Status: CANCELLED | OUTPATIENT
Start: 2017-02-15

## 2017-02-14 RX ORDER — LORAZEPAM 2 MG/ML
0.5 INJECTION INTRAMUSCULAR EVERY 4 HOURS PRN
Status: CANCELLED
Start: 2017-03-08

## 2017-02-14 RX ORDER — DIPHENHYDRAMINE HYDROCHLORIDE 50 MG/ML
50 INJECTION INTRAMUSCULAR; INTRAVENOUS
Status: CANCELLED
Start: 2017-02-22

## 2017-02-14 RX ORDER — ALBUTEROL SULFATE 90 UG/1
1-2 AEROSOL, METERED RESPIRATORY (INHALATION)
Status: CANCELLED
Start: 2017-02-15

## 2017-02-14 RX ORDER — DIPHENHYDRAMINE HYDROCHLORIDE 50 MG/ML
50 INJECTION INTRAMUSCULAR; INTRAVENOUS
Status: CANCELLED
Start: 2017-02-15

## 2017-02-14 RX ORDER — MEPERIDINE HYDROCHLORIDE 25 MG/ML
25 INJECTION INTRAMUSCULAR; INTRAVENOUS; SUBCUTANEOUS EVERY 30 MIN PRN
Status: CANCELLED | OUTPATIENT
Start: 2017-02-15

## 2017-02-14 RX ORDER — PEN NEEDLE, DIABETIC 31 GX5/16"
NEEDLE, DISPOSABLE MISCELLANEOUS
COMMUNITY
Start: 2017-01-31 | End: 2018-01-01

## 2017-02-14 RX ORDER — ALBUTEROL SULFATE 90 UG/1
1-2 AEROSOL, METERED RESPIRATORY (INHALATION)
Status: CANCELLED
Start: 2017-03-01

## 2017-02-14 RX ORDER — EPINEPHRINE 0.3 MG/.3ML
0.3 INJECTION SUBCUTANEOUS EVERY 5 MIN PRN
Status: CANCELLED | OUTPATIENT
Start: 2017-02-22

## 2017-02-14 RX ORDER — AMLODIPINE BESYLATE 5 MG/1
TABLET ORAL
Qty: 30 TABLET | Refills: 0 | Status: SHIPPED | OUTPATIENT
Start: 2017-02-14 | End: 2017-03-13

## 2017-02-14 RX ORDER — METHYLPREDNISOLONE SODIUM SUCCINATE 125 MG/2ML
125 INJECTION, POWDER, LYOPHILIZED, FOR SOLUTION INTRAMUSCULAR; INTRAVENOUS
Status: CANCELLED
Start: 2017-03-01

## 2017-02-14 RX ORDER — ALBUTEROL SULFATE 90 UG/1
1-2 AEROSOL, METERED RESPIRATORY (INHALATION)
Status: CANCELLED
Start: 2017-03-08

## 2017-02-14 RX ORDER — METHYLPREDNISOLONE SODIUM SUCCINATE 125 MG/2ML
125 INJECTION, POWDER, LYOPHILIZED, FOR SOLUTION INTRAMUSCULAR; INTRAVENOUS
Status: CANCELLED
Start: 2017-03-08

## 2017-02-14 RX ORDER — SODIUM CHLORIDE 9 MG/ML
1000 INJECTION, SOLUTION INTRAVENOUS CONTINUOUS PRN
Status: CANCELLED
Start: 2017-03-01

## 2017-02-14 RX ORDER — CIPROFLOXACIN 250 MG/1
TABLET, FILM COATED ORAL
Qty: 20 TABLET | Refills: 0 | Status: SHIPPED | OUTPATIENT
Start: 2017-02-14 | End: 2017-03-22

## 2017-02-14 RX ORDER — LORAZEPAM 2 MG/ML
0.5 INJECTION INTRAMUSCULAR EVERY 4 HOURS PRN
Status: CANCELLED
Start: 2017-02-15

## 2017-02-14 RX ORDER — ALBUTEROL SULFATE 0.83 MG/ML
2.5 SOLUTION RESPIRATORY (INHALATION)
Status: CANCELLED | OUTPATIENT
Start: 2017-02-22

## 2017-02-14 RX ORDER — EPINEPHRINE 0.3 MG/.3ML
0.3 INJECTION SUBCUTANEOUS EVERY 5 MIN PRN
Status: CANCELLED | OUTPATIENT
Start: 2017-02-15

## 2017-02-14 RX ORDER — ALBUTEROL SULFATE 90 UG/1
1-2 AEROSOL, METERED RESPIRATORY (INHALATION)
Status: CANCELLED
Start: 2017-02-22

## 2017-02-14 RX ORDER — MEPERIDINE HYDROCHLORIDE 25 MG/ML
25 INJECTION INTRAMUSCULAR; INTRAVENOUS; SUBCUTANEOUS EVERY 30 MIN PRN
Status: CANCELLED | OUTPATIENT
Start: 2017-03-01

## 2017-02-14 RX ORDER — SODIUM CHLORIDE 9 MG/ML
1000 INJECTION, SOLUTION INTRAVENOUS CONTINUOUS PRN
Status: CANCELLED
Start: 2017-03-08

## 2017-02-14 RX ORDER — METHYLPREDNISOLONE SODIUM SUCCINATE 125 MG/2ML
125 INJECTION, POWDER, LYOPHILIZED, FOR SOLUTION INTRAMUSCULAR; INTRAVENOUS
Status: CANCELLED
Start: 2017-02-15

## 2017-02-14 RX ORDER — DIPHENHYDRAMINE HYDROCHLORIDE 50 MG/ML
50 INJECTION INTRAMUSCULAR; INTRAVENOUS
Status: CANCELLED
Start: 2017-03-01

## 2017-02-14 RX ORDER — DIPHENHYDRAMINE HYDROCHLORIDE 50 MG/ML
50 INJECTION INTRAMUSCULAR; INTRAVENOUS
Status: CANCELLED
Start: 2017-03-08

## 2017-02-14 RX ORDER — MEPERIDINE HYDROCHLORIDE 25 MG/ML
25 INJECTION INTRAMUSCULAR; INTRAVENOUS; SUBCUTANEOUS EVERY 30 MIN PRN
Status: CANCELLED | OUTPATIENT
Start: 2017-02-22

## 2017-02-14 RX ORDER — LORAZEPAM 2 MG/ML
0.5 INJECTION INTRAMUSCULAR EVERY 4 HOURS PRN
Status: CANCELLED
Start: 2017-02-22

## 2017-02-14 RX ORDER — ALBUTEROL SULFATE 0.83 MG/ML
2.5 SOLUTION RESPIRATORY (INHALATION)
Status: CANCELLED | OUTPATIENT
Start: 2017-03-08

## 2017-02-14 RX ORDER — SODIUM CHLORIDE 9 MG/ML
1000 INJECTION, SOLUTION INTRAVENOUS CONTINUOUS PRN
Status: CANCELLED
Start: 2017-02-15

## 2017-02-14 RX ORDER — POLYETHYLENE GLYCOL 3350 17 G/17G
POWDER, FOR SOLUTION ORAL
Status: ON HOLD | COMMUNITY
Start: 2016-11-20 | End: 2018-01-01

## 2017-02-14 RX ORDER — ALBUTEROL SULFATE 0.83 MG/ML
2.5 SOLUTION RESPIRATORY (INHALATION)
Status: CANCELLED | OUTPATIENT
Start: 2017-03-01

## 2017-02-14 RX ORDER — SODIUM CHLORIDE 9 MG/ML
1000 INJECTION, SOLUTION INTRAVENOUS CONTINUOUS PRN
Status: CANCELLED
Start: 2017-02-22

## 2017-02-14 RX ORDER — METHYLPREDNISOLONE SODIUM SUCCINATE 125 MG/2ML
125 INJECTION, POWDER, LYOPHILIZED, FOR SOLUTION INTRAMUSCULAR; INTRAVENOUS
Status: CANCELLED
Start: 2017-02-22

## 2017-02-14 NOTE — TELEPHONE ENCOUNTER
amlodopine     Last Written Prescription Date: 11/20/16  Last Fill Quantity: 30, # refills: 1    Last Office Visit with G, P or [facility] prescribing provider:  1/30/17   Future Office Visit:      Medication is being filled for 1 time refill only due to:  Patient has appt with provider on the 20th    Next 5 appointments (look out 90 days)     Feb 15, 2017  9:45 AM CST   Return Visit with Gretel Quinn MD   [facility] ([facility])    [facility]  [address]  [facility] [address]   [phone]            Feb 20, 2017 10:30 AM CST   SHORT with Dickson Reich MD   [facility] ([facility])    [address] [place] [address]   [phone]                    BP Readings from Last 3 Encounters:   02/14/17 128/64   02/13/17 117/61   02/09/17 154/83

## 2017-02-14 NOTE — PROGRESS NOTES
Reason for visit: blood coming from urethral meatus     HPI: Mr. Roc Parkinson is a very pleasant 89 year old male with a history of superficial bladder cancer and prostate cancer, as well as recently diagnosed multiple myeloma, who returns to the urology clinic today for bleeding from the penis. This has occurred off and on for the past 1 month, but was more significant and persistent starting Sunday evening. Previously, he thought the blood was due to retracting a tight foreskin which would cause cracking of the skin and mild bleeding. I actually saw him 1/6/17 for this issue at which time a vertical fissure was noted on the ventral foreskin which was scabbed over. The penile skin and foreskin had a macerated erythematous appearance c/w chronic urine leak. As a result, Lotrisone cream was prescribed which he used BID for 2 weeks with mild improvement. He has not noticed any new sores or scabs. He believes the bleeding which started 2 days ago is coming directly from the urethra. Also notes some gross hematuria, but denies passage of clots. The bleeding has reduced significantly and is not actively bleeding today. Of note, he also had some bleeding from his gums recently. He is not anticoagulated or on any blood thinners. Had a UA checked yesterday which shows >182 RBC, moderate LE, <1 WBC. Preliminary urine culture reveals 10-50,000 lactose fermenting gram negative rods. He states the physician he saw yesterday was going to prescribe and antibiotic but I do not see anything in the patient's chart. He does take amoxicillin daily.    He currently denies dysuria, frequency, urgency, fevers or chills. Does state that the foreskin has been constricted for many years to the point where he can not retract it back over the head of the penis. 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 Wears Depends for this purpose and goes through 3-4 per day.       He was previously followed by Dr. Godfrey of this office for his bladder and prostate cancers with annual urine cytology, PSA, and MANI. The patient had requested no additional cystoscopic evaluation (please see my progress note from 11/29/16 for more complete history). The patient tells me today that the topic of circumcision had come up in the past but Dr. Godfrey had recommended against it as the patient is a poor surgical candidate. I also saw the patient on 11/29/16 at which time his urine cytology returned negative, PSA was low at 0.10, and MANI was unremarkable.     PHYSICAL EXAM:    /64 (BP Location: Left arm, Patient Position: Dangled, Cuff Size: Adult Regular)  Pulse 64  GENERAL: Well groomed, well developed, well nourished male in NAD.  HEENT: EOMI, AT, NC.  SKIN: Warm to touch, dry.  No visible rashes or lesions on examined areas.  RESP: No increased respiratory effort.    MS: Full ROM in extremities.  : Uncircumcised penis. Phimosis present - I am unable to retract the foreskin to see any portion of the glans. No lesions, sores, or scabs appreciable. No active bleeding. Continuous urine leak evident. Changes to the penile skin (mildly macerated and erythematous) c/w chronic exposure to urine. Scrotum normal. Testicles descended bilaterally.  MANI: Deferred.  NEURO: Alert and oriented x 3.  PSYCH: Normal mood and affect, pleasant and agreeable during interview and exam.     IMAGING: Reviewed abdominal ultrasound from 11/13/16 which revealed normal kidneys with no evidence for hydronephrosis or stones.     LABORATORY:  UA from yesterday reveals 150 glucose, large blood, 30 protein, moderate LE, <1 WBC, >182 RBC, few bacteria.  UC from yesterday reveals 10-50,000 colonies Lactose fermenting gram negative rods. Susceptibility testing in progress.     INR  2/13/17    1.15     ASSESSMENT/PLAN:  Mr. Roc Parkinson is a very pleasant 89 year old male with a history of prostate cancer s/p EBRT s/p  brachytherapy in 2003 (recent PSA 0.10), superficial bladder cancer (no recurrences since 1986, recent negative urine cytology), urethral stricture disease (bulbous, previously managed with dilatations) with continuous urinary incontinence, phimosis with macerated foreksin 2/2 continuous incontinence, recently diagnosed multiple myeloma, and 1 month of off and on transient bleeding from the penile head which has now been more consistent for the past 2 days with no appreciable lesions or sites of active bleeding. His UA/UC from yesterday does reveal >182 RBC and a low colony count of lactose fermenting gram negative rods so possible that blood/hematuria is 2/2 infection. Patient states the physician he saw yesterday was going to prescribe an antibiotic but I do not see anything in patient's chart. Will send a message to Dr. Villaseñor to confirm. Otherwise, will send Cipro 250 mg BID x 10 days as empiric treatment and will await final C/S and adjust antibiotic coverage as needed.    As it is difficult to determine whether blood is from macerated skin around the penile head versus truly in the urine, in addition to patient's significant past medical history of superficial bladder cancer and prostate cancer, would recommend scheduling with next available urologist for possible cystoscopy and consideration for circumcision. Patient verbalized understanding and agreement.    -Continue gentle stretching and cleaning of the foreskin twice daily. Keep the area as dry as possible by changing wet briefs frequently. May continue to use Lotrisone cream BID PRN.     Call or RTC sooner with any issues.     I have enjoyed participating in the medical care of this very pleasant patient.  Please don't hesitate to contact me with any questions or concerns.       Agustina Mendoza PA-C  Urology

## 2017-02-14 NOTE — MR AVS SNAPSHOT
After Visit Summary   2/14/2017    Roc Parkinson    MRN: [id number]           Patient Information     Date Of Birth          7/7/1926        Visit Information        Provider Department      2/14/2017 10:00 AM Agustina Mena PA-C [facility] [place]        Today's Diagnoses     Hematuria    -  1       Follow-ups after your visit        Your next 10 appointments already scheduled     Feb 15, 2017  9:00 AM CST   Level 4 with NORTH [facility] 4   [facility] ([facility])    [facility]  [address]  [facility] [address]   [phone]            Feb 15, 2017  9:45 AM CST   Return Visit with Gretel Quinn MD   [facility] ([facility])    [facility]  [address]  [place] [address]   [phone]            Feb 17, 2017  9:30 AM CST   Diabetic Education with  DIABETIC ED RESOURCE   [facility] ([facility])    [address] [place] [id number] [phone]            Feb 20, 2017 10:30 AM CST   SHORT with Dickson Reich MD   [facility] ([facility])    [address] [place] [address]   [phone]            Feb 21, 2017  2:30 PM CST   Cystoscopy with Radha Jackson MD, [facility] [place] ([facility])    [address]  [address]  [place] [address]   [phone]            Feb 22, 2017 10:30 AM CST   Level 4 with NORTH [facility]IR 4   [facility] ([facility])    [facility]  [address]  [place] [address]   [phone]            Mar 01, 2017 10:30 AM CST   Level 4 with [address] [facility]  "Infusion Center ([facility])    [facility]  [address]  [place] [address] [phone]              Who to contact     If you have questions or need follow up information about today's clinic visit or your schedule please contact [facility] [place] directly at [phone].  Normal or non-critical lab and imaging results will be communicated to you by MyChart, letter or phone within 4 business days after the clinic has received the results. If you do not hear from us within 7 days, please contact the clinic through Novitashart or phone. If you have a critical or abnormal lab result, we will notify you by phone as soon as possible.  Submit refill requests through [employer] or call your pharmacy and they will forward the refill request to us. Please allow 3 business days for your refill to be completed.          Additional Information About Your Visit        NovitasharSetuServ Information     P[employer] lets you send messages to your doctor, view your test results, renew your prescriptions, schedule appointments and more. To sign up, go to [email or web address] . Click on \"Log in\" on the left side of the screen, which will take you to the Welcome page. Then click on \"Sign up Now\" on the right side of the page.     You will be asked to enter the access code listed below, as well as some personal information. Please follow the directions to create your username and password.     Your access code is: [email or web address]  Expires: 3/28/2017 12:15 PM     Your access code will  in 90 days. If you need help or a new code, please call your [facility] or [phone].        Care EveryWhere ID     This is your Care EveryWhere ID. This could be used by other organizations to access your [facility] medical records  [id number]        Your Vitals Were     Pulse                   64            Blood Pressure from Last 3 Encounters:   17 128/64 "   02/13/17 117/61   02/09/17 154/83    Weight from Last 3 Encounters:   02/13/17 88.9 kg (196 lb)   02/08/17 87.8 kg (193 lb 9.6 oz)   02/01/17 87.7 kg (193 lb 6.4 oz)                 Today's Medication Changes          These changes are accurate as of: 2/14/17 10:23 AM.  If you have any questions, ask your nurse or doctor.               These medicines have changed or have updated prescriptions.        Dose/Directions    insulin aspart 100 UNIT/ML injection   Commonly known as:  NovoLOG PEN   This may have changed:  Another medication with the same name was removed. Continue taking this medication, and follow the directions you see here.   Used for:  Uncontrolled type 2 diabetes mellitus with hyperglycemia, unspecified long term insulin use status (H)   Changed by:  Dickson Reich MD        Dose:  1-5 Units   Inject 1-5 Units Subcutaneous 4 times daily (with meals and nightly)   Quantity:  12 mL   Refills:  3                Primary Care Provider Office Phone # Fax #    Dickson Reich [phone]       [facility] [address] [facility] [address]        Thank you!     Thank you for choosing [facility] [place]  for your care. Our goal is always to provide you with excellent care. Hearing back from our patients is one way we can continue to improve our services. Please take a few minutes to complete the written survey that you may receive in the mail after your visit with us. Thank you!             Your Updated Medication List - Protect others around you: Learn how to safely use, store and throw away your medicines at www.disposemymeds.org.          This list is accurate as of: 2/14/17 10:23 AM.  Always use your most recent med list.                   Brand Name Dispense Instructions for use    acyclovir 400 MG tablet    ZOVIRAX    60 tablet    Take 1 tablet (400 mg) by mouth 2 times daily Viral Prophylaxis.       amLODIPine 5 MG  tablet    NORVASC    30 tablet    Take 1 tablet (5 mg) by mouth daily       amoxicillin 500 MG capsule    AMOXIL     Reported on 2/14/2017       clotrimazole-betamethasone cream    LOTRISONE    30 g    Apply topically to the head of the penis two times a day for up to 2 weeks.       dexamethasone 4 MG tablet    DECADRON    20 tablet    Take 20 mg weekly       ferrous sulfate 325 (65 FE) MG tablet    IRON     Take 325 mg by mouth daily (with breakfast)       FREESTYLE LITE test strip   Generic drug:  blood glucose monitoring     200 each    USE TO TEST BLOOD GLUCOSE TWICE DAILY       gemfibrozil 600 MG tablet    LOPID    180 tablet    TAKE 1 TABLET BY MOUTH TWICE DAILY       glipiZIDE 10 MG tablet    GLUCOTROL    180 tablet    TAKE 1 TABLET BY MOUTH TWICE DAILY BEFORE A MEAL       insulin aspart 100 UNIT/ML injection    NovoLOG PEN    12 mL    Inject 1-5 Units Subcutaneous 4 times daily (with meals and nightly)       * insulin pen needle 31G X 6 MM     100 each    Use 2 x daily or as directed.       * B-D U/F 31G X 8 MM   Generic drug:  insulin pen needle          levothyroxine 25 MCG tablet    SYNTHROID/LEVOTHROID    90 tablet    TAKE 1 TABLET BY MOUTH EVERY DAY       lisinopril 30 MG tablet    PRINIVIL,ZESTRIL    90 tablet    TAKE 1 TABLET BY MOUTH EVERY DAY       metoprolol 25 MG tablet    LOPRESSOR    180 tablet    TAKE 1 TABLET BY MOUTH TWICE DAILY       omeprazole 20 MG CR capsule    priLOSEC    90 capsule    TAKE 1 CAPSULE BY MOUTH EVERY DAY       ondansetron 8 MG tablet    ZOFRAN    10 tablet    Take 1 tablet (8 mg) by mouth every 8 hours as needed (Nausea/Vomiting)       polyethylene glycol powder    MIRALAX/GLYCOLAX         prochlorperazine 5 MG tablet    COMPAZINE    30 tablet    Take 1 tablet (5 mg) by mouth every 6 hours as needed (Nausea/Vomiting)       Selenium 200 MCG Tabs      Take 100 mcg by mouth daily. Pt takes one half tab of the 200 mcg daily       tamsulosin 0.4 MG capsule    FLOMAX    90  capsule    TAKE 1 CAPSULE BY MOUTH DAILY       VITAMIN C PO      Take 1,000 mg by mouth daily       VITAMIN D3 PO      Take 1,000 Units by mouth daily       * Notice:  This list has 2 medication(s) that are the same as other medications prescribed for you. Read the directions carefully, and ask your doctor or other care provider to review them with you.

## 2017-02-14 NOTE — PROGRESS NOTES
SUBJECTIVE:  Mr. Roc Parkinson is a 89-year-old gentleman with multiple myeloma.  The patient is currently on treatment with Velcade and dexamethasone.  Last Velcade was on 02/08/2017.      The patient presents to the clinic because of blood from the penis.  The patient was never circumcised. He is not able to retract the foreskin.  Patient states that sometimes he tries to retract it to clean up the area.  Many times he gets bleeding.      Since yesterday he has been having bleeding.  Not able to say whether the bleeding is coming from inside the penis or if it is just from the foreskin.      The patient denies any pain in the penis.  He denies any urinary burning or pain.  No fever or chills.  No abdominal pain.  No back pain.  No trauma.      PHYSICAL EXAMINATION:   GENERAL:  He is alert and oriented x3.   VITAL SIGNS:  Reviewed.     EXTERNAL GENITALIA:  Penis was examined.  He has some clotted blood at the tip.  I tried to retract the foreskin.  I was unable to retract it.  I asked the patient to try to retract it.  He was unable to retract it.  I could not see any lesion or ulcer on the tip of the penis or the foreskin.      ASSESSMENT:   1.  A 89-year-old gentleman with hematuria.  The bleeding could be from foreskin trauma or it could be hematuria.   2.  Multiple myeloma, on Velcade and dexamethasone.   3.  History of superficial bladder cancer.   4.  Pancytopenia.      PLAN:   1.  I discussed with the patient regarding bleeding.  I told him that I am unable to retract the foreskin.  I am not sure that the bleeding is coming from foreskin trauma or it is from the urethra or bladder.      I explained to the patient that he should consider circumcision.  That might avoid future problems.  We will set him up to see the urologist.      2.  The patient is pancytopenic.  He has required transfusion.  We will check a CBC, BMP and INR.  Transfusion will be given as needed.      3.  I advised him to drink plenty of  fluid.      4.  We will get a UA.      5.  He had a few questions, which were all answered.  He will continue to follow up with Dr. Gretel Quinn.         NEW CLEMENTS MD             D: 2017 15:59   T: 2017 19:53   MT:       Name:     JACOB MEDELLIN   MRN:      [id number]        Account:      [id number]   :      1926           Visit Date:   2017      Document: [id number]

## 2017-02-15 ENCOUNTER — HOSPITAL ENCOUNTER (OUTPATIENT)
Facility: CLINIC | Age: 82
Setting detail: SPECIMEN
Discharge: HOME | End: 2017-02-15
Attending: INTERNAL MEDICINE | Admitting: INTERNAL MEDICINE
Payer: MEDICARE

## 2017-02-15 ENCOUNTER — ONCOLOGY VISIT (OUTPATIENT)
Dept: ONCOLOGY | Facility: CLINIC | Age: 82
End: 2017-02-15
Attending: INTERNAL MEDICINE
Payer: MEDICARE

## 2017-02-15 ENCOUNTER — INFUSION THERAPY VISIT (OUTPATIENT)
Dept: INFUSION THERAPY | Facility: CLINIC | Age: 82
End: 2017-02-15
Attending: INTERNAL MEDICINE
Payer: MEDICARE

## 2017-02-15 VITALS
RESPIRATION RATE: 18 BRPM | DIASTOLIC BLOOD PRESSURE: 74 MMHG | HEART RATE: 72 BPM | TEMPERATURE: 98.2 F | OXYGEN SATURATION: 98 % | WEIGHT: 194.8 LBS | SYSTOLIC BLOOD PRESSURE: 136 MMHG | HEIGHT: 70 IN | BODY MASS INDEX: 27.89 KG/M2

## 2017-02-15 VITALS
RESPIRATION RATE: 16 BRPM | TEMPERATURE: 98.2 F | OXYGEN SATURATION: 98 % | BODY MASS INDEX: 27.89 KG/M2 | HEART RATE: 64 BPM | WEIGHT: 194.8 LBS | SYSTOLIC BLOOD PRESSURE: 135 MMHG | DIASTOLIC BLOOD PRESSURE: 60 MMHG | HEIGHT: 70 IN

## 2017-02-15 DIAGNOSIS — C90.00 MULTIPLE MYELOMA NOT HAVING ACHIEVED REMISSION (H): Primary | ICD-10-CM

## 2017-02-15 LAB
ALBUMIN SERPL-MCNC: 2.6 G/DL (ref 3.4–5)
ALP SERPL-CCNC: 81 U/L (ref 40–150)
ALT SERPL W P-5'-P-CCNC: 22 U/L (ref 0–70)
ANION GAP SERPL CALCULATED.3IONS-SCNC: 8 MMOL/L (ref 3–14)
AST SERPL W P-5'-P-CCNC: 18 U/L (ref 0–45)
BACTERIA SPEC CULT: ABNORMAL
BASOPHILS # BLD AUTO: 0 10E9/L (ref 0–0.2)
BASOPHILS NFR BLD AUTO: 0 %
BILIRUB SERPL-MCNC: 0.5 MG/DL (ref 0.2–1.3)
BUN SERPL-MCNC: 34 MG/DL (ref 7–30)
CALCIUM SERPL-MCNC: 9.2 MG/DL (ref 8.5–10.1)
CHLORIDE SERPL-SCNC: 107 MMOL/L (ref 94–109)
CO2 SERPL-SCNC: 20 MMOL/L (ref 20–32)
CREAT SERPL-MCNC: 1.05 MG/DL (ref 0.66–1.25)
DIFFERENTIAL METHOD BLD: ABNORMAL
EOSINOPHIL # BLD AUTO: 0 10E9/L (ref 0–0.7)
EOSINOPHIL NFR BLD AUTO: 0.5 %
ERYTHROCYTE [DISTWIDTH] IN BLOOD BY AUTOMATED COUNT: 24.6 % (ref 10–15)
GFR SERPL CREATININE-BSD FRML MDRD: 66 ML/MIN/1.7M2
GLUCOSE SERPL-MCNC: 279 MG/DL (ref 70–99)
HCT VFR BLD AUTO: 24 % (ref 40–53)
HGB BLD-MCNC: 8 G/DL (ref 13.3–17.7)
IMM GRANULOCYTES # BLD: 0 10E9/L (ref 0–0.4)
IMM GRANULOCYTES NFR BLD: 0.5 %
LYMPHOCYTES # BLD AUTO: 0.5 10E9/L (ref 0.8–5.3)
LYMPHOCYTES NFR BLD AUTO: 26.2 %
Lab: ABNORMAL
MCH RBC QN AUTO: 33.6 PG (ref 26.5–33)
MCHC RBC AUTO-ENTMCNC: 33.3 G/DL (ref 31.5–36.5)
MCV RBC AUTO: 101 FL (ref 78–100)
MICRO REPORT STATUS: ABNORMAL
MICROORGANISM SPEC CULT: ABNORMAL
MONOCYTES # BLD AUTO: 0.1 10E9/L (ref 0–1.3)
MONOCYTES NFR BLD AUTO: 5.9 %
NEUTROPHILS # BLD AUTO: 1.3 10E9/L (ref 1.6–8.3)
NEUTROPHILS NFR BLD AUTO: 66.9 %
NRBC # BLD AUTO: 0 10*3/UL
NRBC BLD AUTO-RTO: 0 /100
PLATELET # BLD AUTO: 52 10E9/L (ref 150–450)
POTASSIUM SERPL-SCNC: 5.4 MMOL/L (ref 3.4–5.3)
PROT SERPL-MCNC: 9.8 G/DL (ref 6.8–8.8)
RBC # BLD AUTO: 2.38 10E12/L (ref 4.4–5.9)
SODIUM SERPL-SCNC: 135 MMOL/L (ref 133–144)
SPECIMEN SOURCE: ABNORMAL
WBC # BLD AUTO: 1.9 10E9/L (ref 4–11)

## 2017-02-15 PROCEDURE — 94640 AIRWAY INHALATION TREATMENT: CPT

## 2017-02-15 PROCEDURE — 99211 OFF/OP EST MAY X REQ PHY/QHP: CPT | Mod: 25

## 2017-02-15 PROCEDURE — 85025 COMPLETE CBC W/AUTO DIFF WBC: CPT | Performed by: INTERNAL MEDICINE

## 2017-02-15 PROCEDURE — 25000308 HC RX OP HPI UCR WEL MED 250 IP 250: Performed by: INTERNAL MEDICINE

## 2017-02-15 PROCEDURE — 25000128 H RX IP 250 OP 636: Performed by: INTERNAL MEDICINE

## 2017-02-15 PROCEDURE — 99214 OFFICE O/P EST MOD 30 MIN: CPT | Performed by: INTERNAL MEDICINE

## 2017-02-15 PROCEDURE — 96401 CHEMO ANTI-NEOPL SQ/IM: CPT

## 2017-02-15 PROCEDURE — 80053 COMPREHEN METABOLIC PANEL: CPT | Performed by: INTERNAL MEDICINE

## 2017-02-15 RX ORDER — ALBUTEROL SULFATE 0.83 MG/ML
10 SOLUTION RESPIRATORY (INHALATION) ONCE
Status: COMPLETED | OUTPATIENT
Start: 2017-02-15 | End: 2017-02-15

## 2017-02-15 RX ORDER — DEXAMETHASONE 4 MG/1
40 TABLET ORAL
Qty: 40 TABLET | Refills: 0 | Status: SHIPPED | OUTPATIENT
Start: 2017-02-15 | End: 2017-03-09

## 2017-02-15 RX ADMIN — ALBUTEROL SULFATE 10 MG: 2.5 SOLUTION RESPIRATORY (INHALATION) at 11:15

## 2017-02-15 RX ADMIN — BORTEZOMIB 2.1 MG: 3.5 INJECTION, POWDER, LYOPHILIZED, FOR SOLUTION INTRAVENOUS; SUBCUTANEOUS at 11:08

## 2017-02-15 ASSESSMENT — PAIN SCALES - GENERAL
PAINLEVEL: MILD PAIN (2)
PAINLEVEL: MODERATE PAIN (4)

## 2017-02-15 NOTE — PATIENT INSTRUCTIONS
1.  Continue Velcade and dexamethasone. Velcade today.  2.  CBC, diff weekly- transfuse to keep hemoglobin of 8 and platelet of 10,000.   3.  RTC MD 4 weeks.   4.  Continue Zometa  5.  Complete 10 days of Cipro.  6.  Please go over the medication list with the patient.

## 2017-02-15 NOTE — PROGRESS NOTES
"[facility]  HEMATOLOGY ONCOLOGY     DIAGNOSIS:    1- Kappa light chain IgM multiple myeloma in a 89-year-old patient.  Bone marrow biopsy on 11/17/2016 showed hypercellular bone marrow with 65% cellularity of light chain restricted plasma cells.  There is a background of myelodysplastic syndrome.  Cytogenetics and FISH panel are pending at this point.  The patient was initially seen in the hospital on 11/17/2016 for macrocytic anemia and pancytopenia and transfusion requirement and a bone marrow biopsy was performed.   2- History of prostate cancer s/p EBRT s/p brachytherapy in 2003 (recent PSA 0.10), superficial bladder cancer ,no recurrences since 1986     TREATMENT: 12/15/16 velcade/dex      SUBJECTIVE:  The patient was seen as a followup today. He is on Cipro for hematuria/phimosis. Tolerating treatment well.     REVIEW OF SYSTEMS:  A complete review of systems was performed and found to be negative other than pertinent positives mentioned in history of present illness.     Past medical, social histories reviewed.    Meds- Reviewed.     PHYSICAL EXAMINATION:   VITAL SIGNS:/60 (BP Location: Left arm)  Pulse 64  Temp 98.2  F (36.8  C) (Oral)  Resp 16  Ht 1.778 m (5' 10\")  Wt 88.4 kg (194 lb 12.8 oz)  SpO2 98%  BMI 27.95 kg/m2  GENERAL: Sitting comfortably.   HEENT: Pupils are equal. Oropharynx is clear.   NECK: No cervical or supraclavicular lymphadenopathy.   LUNGS: Clear bilaterally.   HEART: S1, S2, regular.   ABDOMEN: Soft, nontender, nondistended, no hepatosplenomegaly.   EXTREMITIES: Warm, well perfused.   NEUROLOGIC: Alert, awake.   SKIN: No rash.   LYMPHATICS: No edema.      LABORATORY DATA:   Recent Labs   Lab Test  02/13/17   1536  02/08/17   1300   NA  135  135   POTASSIUM  5.6*  5.0   CHLORIDE  107  107   CO2  19*  19*   ANIONGAP  9  9   BUN  35*  33*   CR  0.95  1.26*   GLC  275*  247*   MICHELLE  9.3  9.2     Recent Labs   Lab Test  02/13/17   1536  02/08/17   1300  " 02/01/17   1100   WBC  2.2*  2.4*  3.5*   HGB  8.2*  7.5*  7.4*   PLT  40*  53*  41*   MCV  100  101*  100   NEUTROPHIL  53.8  64.1  84.1     Recent Labs   Lab Test  02/08/17   1300  01/25/17   1030  01/18/17   1010  01/11/17   1030   11/17/16   0938   BILITOTAL  0.4   --   0.6  0.4   < >   --    ALKPHOS  76   --   80  73   < >   --    ALT  22   --   24  28   < >   --    AST  16   --   13  22   < >   --    ALBUMIN  2.6*  2.7*  2.6*  2.6*   < >   --    LDH   --    --    --    --    --   110    < > = values in this interval not displayed.     Results for JACOB MEDELLIN (MRN [id number]) as of 2/15/2017 09:28   Ref. Range 11/30/2016 14:10 12/11/2016 06:00 2/8/2017 13:00   IGA Latest Ref Range: 70 - 380 mg/dL 45 (L)  26 (L)   IGG Latest Ref Range: 695 - 1620 mg/dL 409 (L)  378 (L)   IGM Latest Ref Range: 60 - 265 mg/dL 7930 (H)  6470 (H)   Immunofixation ELP Unknown (Note)...  (Note)...   Kappa Free Lt Chain Latest Ref Range: 0.33 - 1.94 mg/dL 212.00 (H)  178.75 (H)   Kappa Lambda Ratio Latest Ref Range: 0.26 - 1.65  124.71 (H)  156.80 (H)   Lambda Free Lt Chain Latest Ref Range: 0.57 - 2.63 mg/dL 1.70  1.14   Monoclonal Peak Latest Ref Range: 0.0 g/dL 4.5 (H)  3.4 (H)       ECOG PS: 1    ASSESSMENT:  This is a 89-year-old gentleman who has kappa light chain multiple myeloma with hypercellular marrow with 65% of cells are light chain restricted plasma cells.  He has severe pancytopenia and has needed a transfusion in the hospital.  He did not have hypercalcemia and his renal function was normal. He has a background of myelodysplastic syndrome on his bone marrow biopsy; however, majority of the cell population was monoclonal plasma cell population.   He was started on treatment due to cytopenia.   He is on weekly Velcade with dexamethasone 20 mg every week with every week labs and transfusion support at this time.   - Labs were reviewed with the patient today- partial response in terms of a decrease in M spike and  light chains.   - He did not have a much change in his cytopenias. There is a possibility that due to background of MDS we may not be able to see significant improvement in his counts.   - I will continue current regimen of Velcade and Dex for now.   - He is taking Cipro for phimosis and will need a follow up with urology for cystoscopy.     PLAN:   1.  Continue Velcade and dexamethasone. Velcade today.  2.  CBC, diff weekly- transfuse to keep hemoglobin of 8 and platelet of 10,000.   3.  RTC MD 4 weeks.   4.  Continue Zometa  5. Complete 10 days of Cipro.  6- Please go over the medication list with the patient.     ALISON JON MD    2/15/2017

## 2017-02-15 NOTE — MR AVS SNAPSHOT
After Visit Summary   2/15/2017    oRc Parkinson    MRN: [id number]           Patient Information     Date Of Birth          7/7/1926        Visit Information        Provider Department      2/15/2017 9:45 AM Gretel Quinn MD [facility]        Today's Diagnoses     Multiple myeloma not having achieved remission (H)    -  1      Care Instructions    1.  Continue Velcade and dexamethasone. Velcade today.  2.  CBC, diff weekly- transfuse to keep hemoglobin of 8 and platelet of 10,000.   3.  RTC MD 4 weeks.   4.  Continue Zometa  5.  Complete 10 days of Cipro.  6.  Please go over the medication list with the patient.         Follow-ups after your visit        Your next 10 appointments already scheduled     Feb 21, 2017  2:30 PM CST   Cystoscopy with Radha Jackson MD, [facility] [place] ([facility] [place])    [address]  [address]  [facility] [address]   [phone]            Feb 22, 2017 10:30 AM CST   Level 4 with [address] [facility] ([facility])    [facility]  [address]  [facility] [address]   [phone]            Mar 01, 2017 10:30 AM CST   Level 4 with NORTH CHAIR 4   [facility] ([facility])    [facility]  [address]  [facility] [address]   [phone]            Mar 06, 2017 10:30 AM CST   Diabetic Education with  DIABETIC ED RESOURCE   [facility] ([facility])    [address] [address]   [phone]            Mar 08, 2017  9:30 AM CST   Level 5 with [facility] 4   [facility] ([facility])    [facility]  [address]  [facility] [address]   [phone]            Mar 15, 2017  9:30  "AM CDT   Level 5 with NORTH CHAIR 7   [facility] ([facility])    [facility]  [address]  [place] [address]   [phone]            Mar 15, 2017  9:45 AM CDT   Return Visit with Gretel Quinn MD   [facility] ([facility])    [facility]  [address] [facility] [address]  [place] [address]   [phone]            Mar 20, 2017  9:00 AM CDT   SHORT with Dickson Reich MD   [facility] ([facility])    [address] [place] [address]   [phone]            Mar 22, 2017  9:30 AM CDT   Level 5 with NORTH CHAIR 5   [facility] ([facility])    [place] [id number] [facility] [address]  [facility] [address]   [phone]              Who to contact     If you have questions or need follow up information about today's clinic visit or your schedule please contact [facility] directly at [phone].  Normal or non-critical lab and imaging results will be communicated to you by "[employer], letter or phone within 4 business days after the clinic has received the results. If you do not hear from us within 7 days, please contact the clinic through MyChart or phone. If you have a critical or abnormal lab result, we will notify you by phone as soon as possible.  Submit refill requests through [employer] or call your pharmacy and they will forward the refill request to us. Please allow 3 business days for your refill to be completed.          Additional Information About Your Visit        [employer] Information     [employer] lets you send messages to your doctor, view your test results, renew your prescriptions, schedule appointments and more. To sign up, go to [email or web address] . Click on \"Log in\" on the left side of the screen, " "which will take you to the Welcome page. Then click on \"Sign up Now\" on the right side of the page.     You will be asked to enter the access code listed below, as well as some personal information. Please follow the directions to create your username and password.     Your access code is: [email or web address]  Expires: 3/28/2017 12:15 PM     Your access code will  in 90 days. If you need help or a new code, please call your [facility] or [phone].        Care EveryWhere ID     This is your Care EveryWhere ID. This could be used by other organizations to access your [place] medical records  [id number]        Your Vitals Were     Pulse Temperature Respirations Height Pulse Oximetry BMI (Body Mass Index)    64 98.2  F (36.8  C) (Oral) 16 1.778 m (5' 10\") 98% 27.95 kg/m2       Blood Pressure from Last 3 Encounters:   17 124/76   02/15/17 135/60   02/15/17 136/74    Weight from Last 3 Encounters:   17 88 kg (194 lb)   02/15/17 88.4 kg (194 lb 12.8 oz)   02/15/17 88.4 kg (194 lb 12.8 oz)              Today, you had the following     No orders found for display         Today's Medication Changes          These changes are accurate as of: 2/15/17 11:59 PM.  If you have any questions, ask your nurse or doctor.               Start taking these medicines.        Dose/Directions    dexamethasone 4 MG tablet   Commonly known as:  DECADRON   Used for:  Multiple myeloma not having achieved remission (H)        Dose:  40 mg   Take 10 tablets (40 mg) by mouth every 7 days for 4 doses Take daily on Days 1, 8, 15, and 22. Cycles 3 and 4 ONLY.   Quantity:  40 tablet   Refills:  0         Stop taking these medicines if you haven't already. Please contact your care team if you have questions.     amoxicillin 500 MG capsule   Commonly known as:  AMOXIL   Stopped by:  Gretel Quinn MD                Where to get your medicines      These medications were sent to [facility] [address] [facility] [address]  [address], [facility] [address]    Hours:  24-hours Phone:  [phone]     dexamethasone 4 MG tablet                Primary Care Provider Office Phone # Fax #    Dickson Reich [phone]       [facility] [place] [address]  [facility] [address]        Thank you!     Thank you for choosing [facility]  for your care. Our goal is always to provide you with excellent care. Hearing back from our patients is one way we can continue to improve our services. Please take a few minutes to complete the written survey that you may receive in the mail after your visit with us. Thank you!             Your Updated Medication List - Protect others around you: Learn how to safely use, store and throw away your medicines at www.disposemymeds.org.          This list is accurate as of: 2/15/17 11:59 PM.  Always use your most recent med list.                   Brand Name Dispense Instructions for use    acyclovir 400 MG tablet    ZOVIRAX    60 tablet    Take 1 tablet (400 mg) by mouth 2 times daily Viral Prophylaxis.       amLODIPine 5 MG tablet    NORVASC    30 tablet    TAKE 1 TABLET BY MOUTH DAILY       ciprofloxacin 250 MG tablet    CIPRO    20 tablet    Take 1 tablet two times daily for 10 days.       clotrimazole-betamethasone cream    LOTRISONE    30 g    Apply topically to the head of the penis two times a day for up to 2 weeks.       dexamethasone 4 MG tablet    DECADRON    40 tablet    Take 10 tablets (40 mg) by mouth every 7 days for 4 doses Take daily on Days 1, 8, 15, and 22. Cycles 3 and 4 ONLY.       ferrous sulfate 325 (65 FE) MG tablet    IRON     Take 325 mg by mouth daily (with breakfast)       FREESTYLE LITE test strip   Generic drug:  blood glucose monitoring     200 each    USE TO TEST BLOOD GLUCOSE TWICE DAILY       gemfibrozil 600 MG tablet    LOPID    180 tablet    TAKE 1 TABLET BY MOUTH TWICE DAILY        glipiZIDE 10 MG tablet    GLUCOTROL    180 tablet    TAKE 1 TABLET BY MOUTH TWICE DAILY BEFORE A MEAL       * insulin pen needle 31G X 6 MM     100 each    Use 2 x daily or as directed.       * B-D U/F 31G X 8 MM   Generic drug:  insulin pen needle          lisinopril 30 MG tablet    PRINIVIL,ZESTRIL    90 tablet    TAKE 1 TABLET BY MOUTH EVERY DAY       metoprolol 25 MG tablet    LOPRESSOR    180 tablet    TAKE 1 TABLET BY MOUTH TWICE DAILY       omeprazole 20 MG CR capsule    priLOSEC    90 capsule    TAKE 1 CAPSULE BY MOUTH EVERY DAY       ondansetron 8 MG tablet    ZOFRAN    10 tablet    Take 1 tablet (8 mg) by mouth every 8 hours as needed (Nausea/Vomiting)       polyethylene glycol powder    MIRALAX/GLYCOLAX         prochlorperazine 5 MG tablet    COMPAZINE    30 tablet    Take 1 tablet (5 mg) by mouth every 6 hours as needed (Nausea/Vomiting)       Selenium 200 MCG Tabs      Take 100 mcg by mouth daily. Pt takes one half tab of the 200 mcg daily       tamsulosin 0.4 MG capsule    FLOMAX    90 capsule    TAKE 1 CAPSULE BY MOUTH DAILY       VITAMIN C PO      Take 1,000 mg by mouth daily       VITAMIN D3 PO      Take 1,000 Units by mouth daily       * Notice:  This list has 2 medication(s) that are the same as other medications prescribed for you. Read the directions carefully, and ask your doctor or other care provider to review them with you.

## 2017-02-15 NOTE — PROGRESS NOTES
Infusion Nursing Note:  Roc Parkinson presents today for C3D1 Velcade.    Patient seen by provider today: Yes: Dr. Quinn   present during visit today: Not Applicable.    Note: N/A.    Intravenous Access:  Labs drawn without difficulty.  Peripheral IV placed.    Treatment Conditions:  Lab Results   Component Value Date    HGB 8.0 02/15/2017     Lab Results   Component Value Date    WBC 1.9 02/15/2017      Lab Results   Component Value Date    ANEU 1.3 02/15/2017     Lab Results   Component Value Date    PLT 52 02/15/2017      Lab Results   Component Value Date     02/15/2017                   Lab Results   Component Value Date    POTASSIUM 5.4 02/15/2017           No results found for: MAG         Lab Results   Component Value Date    CR 1.05 02/15/2017                   Lab Results   Component Value Date    MICHELLE 9.2 02/15/2017                Lab Results   Component Value Date    BILITOTAL 0.5 02/15/2017           Lab Results   Component Value Date    ALBUMIN 2.6 02/15/2017                    Lab Results   Component Value Date    ALT 22 02/15/2017           Lab Results   Component Value Date    AST 18 02/15/2017     Results reviewed, labs  Do NOT MEET treatment parameters, ok to proceed with treatment-  Ok per Dr. Quinn to proceed with velcade with platelets of 52.    Post Infusion Assessment:  Patient tolerated injection without incident.  Site patent and intact, free from redness, edema or discomfort.  No evidence of extravasations.  Access discontinued per protocol.    Discharge Plan:   Prescription refills given for dexamethasone.  Discharge instructions reviewed with: Patient.  Patient and/or family verbalized understanding of discharge instructions and all questions answered.  Copy of AVS reviewed with patient and/or family.  Patient will return 2/22/17 for next appointment.  Patient discharged in stable condition accompanied by: self.  Departure Mode: Ambulatory.    Kia Whitman,  RN

## 2017-02-15 NOTE — MR AVS SNAPSHOT
After Visit Summary   2/15/2017    Roc Parkinson    MRN: [id number]           Patient Information     Date Of Birth          7/7/1926        Visit Information        Provider Department      2/15/2017 9:00 AM NORTH CHAIR 4 [facility]        Today's Diagnoses     Multiple myeloma not having achieved remission (H)    -  1      Care Instructions     Dexamethasone at [facility] and take today.        Follow-ups after your visit        Follow-up notes from your care team     Return in 7 days (on 2/22/2017).      Your next 10 appointments already scheduled     Feb 17, 2017  9:30 AM CST   Diabetic Education with  DIABETIC ED RESOURCE   [facility] ([facility])    [address] [id number] [phone]            Feb 20, 2017 10:30 AM CST   SHORT with Dickson Reich MD   [facility]    [address] [place] [address] [address]  [facility] [address]   [phone]            Feb 21, 2017  2:30 PM CST   [facility]yst[facility] Radha Jackson MD,  [facility]    [address]  [address]  [facility] [address] [phone]            Feb 22, 2017 10:30 AM CST   Level 4 with NORTH CHAIR 4   [facility] ([facility])    [facility]  [address]  [facility] [address]   [phone]            Mar 01, 2017 10:30 AM CST   Level 4 with NORTH CHAIR 4   [facility] ([facility])    [facility]  [address]  [facility] [address]   [phone]            Mar 08, 2017  9:30 AM CST   Level 5 with NORTH CHAIR 4   [facility] ([facility])    [facility]  "[facility]  [address]  [place] [address]   [phone]            Mar 15, 2017  9:30 AM CDT   Level 5 with NORTH CHAIR 7   [facility] ([facility])    [facility]  [address]  [place] [address]   [phone]            Mar 15, 2017  9:45 AM CDT   Return Visit with Gretel Quinn MD   [facility] ([facility])    [place] [id number]  [place] [address]   [phone]            Mar 22, 2017  9:30 AM CDT   Level 5 with NORTH CHAIR 5   [facility] ([facility])    [facility]  [address]  [place] [address]   [phone]              Who to contact     If you have questions or need follow up information about today's clinic visit or your schedule please contact [facility] directly at [phone].  Normal or non-critical lab and imaging results will be communicated to you by [employer], letter or phone within 4 business days after the clinic has received the results. If you do not hear from us within 7 days, please contact the clinic through [employer] or phone. If you have a critical or abnormal lab result, we will notify you by phone as soon as possible.  Submit refill requests through [employer] or call your pharmacy and they will forward the refill request to us. Please allow 3 business days for your refill to be completed.          Additional Information About Your Visit        [employer] Information     [employer] lets you send messages to your doctor, view your test results, renew your prescriptions, schedule appointments and more. To sign up, go to [email or web address] . Click on \"Log in\" on the left side of the screen, which will take you to the Welcome page. Then click on \"Sign up Now\" on the right side of the page.     You " "will be asked to enter the access code listed below, as well as some personal information. Please follow the directions to create your username and password.     Your access code is: [email or web address]  Expires: 3/28/2017 12:15 PM     Your access code will  in 90 days. If you need help or a new code, please call your [place] clinic or [phone].        Care EveryWhere ID     This is your Care EveryWhere ID. This could be used by other organizations to access your [place] medical records  [id number]        Your Vitals Were     Pulse Temperature Respirations Height Pulse Oximetry BMI (Body Mass Index)    72 98.2  F (36.8  C) (Oral) 18 1.778 m (5' 10\") 98% 27.95 kg/m2       Blood Pressure from Last 3 Encounters:   02/15/17 135/60   02/15/17 136/74   17 128/64    Weight from Last 3 Encounters:   02/15/17 88.4 kg (194 lb 12.8 oz)   02/15/17 88.4 kg (194 lb 12.8 oz)   17 88.9 kg (196 lb)              We Performed the Following     CBC with platelets differential     Comprehensive metabolic panel     Treatment Conditions          Today's Medication Changes          These changes are accurate as of: 2/15/17 12:06 PM.  If you have any questions, ask your nurse or doctor.               Start taking these medicines.        Dose/Directions    dexamethasone 4 MG tablet   Commonly known as:  DECADRON   Used for:  Multiple myeloma not having achieved remission (H)        Dose:  40 mg   Take 10 tablets (40 mg) by mouth every 7 days for 4 doses Take daily on Days 1, 8, 15, and 22. Cycles 3 and 4 ONLY.   Quantity:  40 tablet   Refills:  0         Stop taking these medicines if you haven't already. Please contact your care team if you have questions.     amoxicillin 500 MG capsule   Commonly known as:  AMOXIL   Stopped by:  Gretel Quinn MD                Where to get your medicines      These medications were sent to [email or web address] - [place], [place] - [address] AT [facility] [address], [facility] [address]    Hours:  24-hours Phone:  [phone]     dexamethasone 4 MG tablet                Primary Care Provider Office Phone # Fax #    Dickson Reich [phone]       [facility] [address]  [facility] [address]        Thank you!     Thank you for choosing [facility]  for your care. Our goal is always to provide you with excellent care. Hearing back from our patients is one way we can continue to improve our services. Please take a few minutes to complete the written survey that you may receive in the mail after your visit with us. Thank you!             Your Updated Medication List - Protect others around you: Learn how to safely use, store and throw away your medicines at www.disposemymeds.org.          This list is accurate as of: 2/15/17 12:06 PM.  Always use your most recent med list.                   Brand Name Dispense Instructions for use    acyclovir 400 MG tablet    ZOVIRAX    60 tablet    Take 1 tablet (400 mg) by mouth 2 times daily Viral Prophylaxis.       amLODIPine 5 MG tablet    NORVASC    30 tablet    TAKE 1 TABLET BY MOUTH DAILY       ciprofloxacin 250 MG tablet    CIPRO    20 tablet    Take 1 tablet two times daily for 10 days.       clotrimazole-betamethasone cream    LOTRISONE    30 g    Apply topically to the head of the penis two times a day for up to 2 weeks.       dexamethasone 4 MG tablet    DECADRON    40 tablet    Take 10 tablets (40 mg) by mouth every 7 days for 4 doses Take daily on Days 1, 8, 15, and 22. Cycles 3 and 4 ONLY.       ferrous sulfate 325 (65 FE) MG tablet    IRON     Take 325 mg by mouth daily (with breakfast)       FREESTYLE LITE test strip   Generic drug:  blood glucose monitoring     200 each    USE TO TEST BLOOD GLUCOSE TWICE DAILY       gemfibrozil 600 MG tablet    LOPID    180 tablet    TAKE 1 TABLET BY MOUTH TWICE DAILY       glipiZIDE 10 MG tablet     GLUCOTROL    180 tablet    TAKE 1 TABLET BY MOUTH TWICE DAILY BEFORE A MEAL       insulin aspart 100 UNIT/ML injection    NovoLOG PEN    12 mL    Inject 1-5 Units Subcutaneous 4 times daily (with meals and nightly)       * insulin pen needle 31G X 6 MM     100 each    Use 2 x daily or as directed.       * B-D U/F 31G X 8 MM   Generic drug:  insulin pen needle          levothyroxine 25 MCG tablet    SYNTHROID/LEVOTHROID    90 tablet    TAKE 1 TABLET BY MOUTH EVERY DAY       lisinopril 30 MG tablet    PRINIVIL,ZESTRIL    90 tablet    TAKE 1 TABLET BY MOUTH EVERY DAY       metoprolol 25 MG tablet    LOPRESSOR    180 tablet    TAKE 1 TABLET BY MOUTH TWICE DAILY       omeprazole 20 MG CR capsule    priLOSEC    90 capsule    TAKE 1 CAPSULE BY MOUTH EVERY DAY       ondansetron 8 MG tablet    ZOFRAN    10 tablet    Take 1 tablet (8 mg) by mouth every 8 hours as needed (Nausea/Vomiting)       polyethylene glycol powder    MIRALAX/GLYCOLAX         prochlorperazine 5 MG tablet    COMPAZINE    30 tablet    Take 1 tablet (5 mg) by mouth every 6 hours as needed (Nausea/Vomiting)       Selenium 200 MCG Tabs      Take 100 mcg by mouth daily. Pt takes one half tab of the 200 mcg daily       tamsulosin 0.4 MG capsule    FLOMAX    90 capsule    TAKE 1 CAPSULE BY MOUTH DAILY       VITAMIN C PO      Take 1,000 mg by mouth daily       VITAMIN D3 PO      Take 1,000 Units by mouth daily       * Notice:  This list has 2 medication(s) that are the same as other medications prescribed for you. Read the directions carefully, and ask your doctor or other care provider to review them with you.

## 2017-02-15 NOTE — PROGRESS NOTES
"Roc Parkinson is a 89 year old male who presents for:  Chief Complaint   Patient presents with     Oncology Clinic Visit     RET Multiple Myeloma        Initial Vitals:  /60 (BP Location: Left arm)  Pulse 64  Temp 98.2  F (36.8  C) (Oral)  Resp 16  Ht 1.778 m (5' 10\")  Wt 88.4 kg (194 lb 12.8 oz)  SpO2 98%  BMI 27.95 kg/m2 Estimated body mass index is 27.95 kg/(m^2) as calculated from the following:    Height as of this encounter: 1.778 m (5' 10\").    Weight as of this encounter: 88.4 kg (194 lb 12.8 oz).. Body surface area is 2.09 meters squared. BP completed using cuff size: regular  Mild Pain (2) No LMP for male patient. Allergies and medications reviewed.     Medications: Medication refills not needed today.  Pharmacy name entered into [employer]View:    [facility] [address] [place] [address] [address] AT [facility] [address] PHARMACY [facility] [address]    Comments: None    6 minutes for nursing intake (face to face time)   Massiel Sanchez CMA          "

## 2017-02-17 ENCOUNTER — ALLIED HEALTH/NURSE VISIT (OUTPATIENT)
Dept: EDUCATION SERVICES | Facility: CLINIC | Age: 82
End: 2017-02-17
Payer: COMMERCIAL

## 2017-02-17 DIAGNOSIS — N18.30 TYPE 2 DIABETES MELLITUS WITH STAGE 3 CHRONIC KIDNEY DISEASE, WITH LONG-TERM CURRENT USE OF INSULIN (H): Primary | ICD-10-CM

## 2017-02-17 DIAGNOSIS — E11.22 TYPE 2 DIABETES MELLITUS WITH STAGE 3 CHRONIC KIDNEY DISEASE, WITH LONG-TERM CURRENT USE OF INSULIN (H): Primary | ICD-10-CM

## 2017-02-17 DIAGNOSIS — E11.65 UNCONTROLLED TYPE 2 DIABETES MELLITUS WITH HYPERGLYCEMIA, UNSPECIFIED LONG TERM INSULIN USE STATUS: ICD-10-CM

## 2017-02-17 DIAGNOSIS — Z79.4 TYPE 2 DIABETES MELLITUS WITH STAGE 3 CHRONIC KIDNEY DISEASE, WITH LONG-TERM CURRENT USE OF INSULIN (H): Primary | ICD-10-CM

## 2017-02-17 PROCEDURE — G0108 DIAB MANAGE TRN  PER INDIV: HCPCS

## 2017-02-17 NOTE — Clinical Note
FYI - pt did not answer his phone so left message explaining the new correction scale (just so you know in case he has questions when he sees you next week).  Updated his med list with it as well. Thanks again! Kateryna Quiñonez) [place], RD LD CDE

## 2017-02-17 NOTE — MR AVS SNAPSHOT
After Visit Summary   2/17/2017    Roc Parkinson    MRN: [id number]           Patient Information     Date Of Birth          7/7/1926        Visit Information        Provider Department      2/17/2017 9:30 AM  DIABETIC ED RESOURCE [facility]        Today's Diagnoses     Type 2 diabetes mellitus with stage 3 chronic kidney disease, with long-term current use of insulin (H)    -  1       Follow-ups after your visit        Your next 10 appointments already scheduled     Feb 20, 2017 10:30 AM CST   [facility] MD   [facility]    [address]  [facility] [address]   [phone]            Feb 21, 2017  2:30 PM CST   Cystoscopy with Radha Jackson MD, [facility] [place] ([facility] [place])    [address]  [address]  [facility] [address]   [phone]            Feb 22, 2017 10:30 AM CST   Level 4 with NORTH CHAIR 4   [facility] ([facility])    [facility]  [address]  [place] [address]   [phone]            Mar 01, 2017 10:30 AM CST   Level 4 with Flasher CHAIR 4   [facility] ([facility])    [facility] [place]  [address]  [place] [address]   [phone]            Mar 03, 2017  9:30 AM CST   Diabetic Education with  DIABETIC ED RESOURCE   [facility] ([facility])    [address] [place] [id number] [phone]            Mar 08, 2017  9:30 AM CST   Level 5 with [facility] 4   [facility] ([facility])    [facility]  [address]  [place] [address] [phone]             "Mar 15, 2017  9:30 AM CDT   Level 5 with NORTH CHAIR 7   [facility] ([facility])    [facility] [place]  [address]  [place] [address]   [phone]            Mar 15, 2017  9:45 AM CDT   Return Visit with Gretel Quinn MD   [facility] ([facility])    [facility] [place] [address]  [place] [address]   [phone]            Mar 22, 2017  9:30 AM CDT   Level 5 with NORTH CHAIR 5   [facility] ([facility])    [facility] [place]  [address]  [place] [address]   [phone]              Who to contact     If you have questions or need follow up information about today's clinic visit or your schedule please contact [facility] directly at [phone].  Normal or non-critical lab and imaging results will be communicated to you by [employer], letter or phone within 4 business days after the clinic has received the results. If you do not hear from us within 7 days, please contact the clinic through [employer] or phone. If you have a critical or abnormal lab result, we will notify you by phone as soon as possible.  Submit refill requests through [employer] or call your pharmacy and they will forward the refill request to us. Please allow 3 business days for your refill to be completed.          Additional Information About Your Visit        [employer] Information     [employer] lets you send messages to your doctor, view your test results, renew your prescriptions, schedule appointments and more. To sign up, go to [email or web address] . Click on \"Log in\" on the left side of the screen, which will take you to the Welcome page. Then click on \"Sign up Now\" on the right side of the page.     You will be asked to enter the access code listed below, as well as some personal information. Please follow the " directions to create your username and password.     Your access code is: [email or web address]  Expires: 3/28/2017 12:15 PM     Your access code will  in 90 days. If you need help or a new code, please call your [facility] clinic or [phone].        Care EveryWhere ID     This is your Care EveryWhere ID. This could be used by other organizations to access your [facility] medical records  [id number]         Blood Pressure from Last 3 Encounters:   02/15/17 135/60   02/15/17 136/74   17 128/64    Weight from Last 3 Encounters:   02/15/17 88.4 kg (194 lb 12.8 oz)   02/15/17 88.4 kg (194 lb 12.8 oz)   17 88.9 kg (196 lb)              We Performed the Following     DIABETES EDUCATION - Individual  []        Primary Care Provider Office Phone # Fax #    Dickson Reich [phone]       [facility] [address] [facility] [address]        Thank you!     Thank you for choosing [facility]  for your care. Our goal is always to provide you with excellent care. Hearing back from our patients is one way we can continue to improve our services. Please take a few minutes to complete the written survey that you may receive in the mail after your visit with us. Thank you!             Your Updated Medication List - Protect others around you: Learn how to safely use, store and throw away your medicines at www.disposemymeds.org.          This list is accurate as of: 17 11:13 AM.  Always use your most recent med list.                   Brand Name Dispense Instructions for use    acyclovir 400 MG tablet    ZOVIRAX    60 tablet    Take 1 tablet (400 mg) by mouth 2 times daily Viral Prophylaxis.       amLODIPine 5 MG tablet    NORVASC    30 tablet    TAKE 1 TABLET BY MOUTH DAILY       ciprofloxacin 250 MG tablet    CIPRO    20 tablet    Take 1 tablet two times daily for 10 days.       clotrimazole-betamethasone cream    LOTRISONE    30 g    Apply  topically to the head of the penis two times a day for up to 2 weeks.       dexamethasone 4 MG tablet    DECADRON    40 tablet    Take 10 tablets (40 mg) by mouth every 7 days for 4 doses Take daily on Days 1, 8, 15, and 22. Cycles 3 and 4 ONLY.       ferrous sulfate 325 (65 FE) MG tablet    IRON     Take 325 mg by mouth daily (with breakfast)       FREESTYLE LITE test strip   Generic drug:  blood glucose monitoring     200 each    USE TO TEST BLOOD GLUCOSE TWICE DAILY       gemfibrozil 600 MG tablet    LOPID    180 tablet    TAKE 1 TABLET BY MOUTH TWICE DAILY       glipiZIDE 10 MG tablet    GLUCOTROL    180 tablet    TAKE 1 TABLET BY MOUTH TWICE DAILY BEFORE A MEAL       insulin aspart 100 UNIT/ML injection    NovoLOG PEN    12 mL    Inject 1-5 Units Subcutaneous 4 times daily (with meals and nightly)       * insulin pen needle 31G X 6 MM     100 each    Use 2 x daily or as directed.       * B-D U/F 31G X 8 MM   Generic drug:  insulin pen needle          levothyroxine 25 MCG tablet    SYNTHROID/LEVOTHROID    90 tablet    TAKE 1 TABLET BY MOUTH EVERY DAY       lisinopril 30 MG tablet    PRINIVIL,ZESTRIL    90 tablet    TAKE 1 TABLET BY MOUTH EVERY DAY       metoprolol 25 MG tablet    LOPRESSOR    180 tablet    TAKE 1 TABLET BY MOUTH TWICE DAILY       omeprazole 20 MG CR capsule    priLOSEC    90 capsule    TAKE 1 CAPSULE BY MOUTH EVERY DAY       ondansetron 8 MG tablet    ZOFRAN    10 tablet    Take 1 tablet (8 mg) by mouth every 8 hours as needed (Nausea/Vomiting)       polyethylene glycol powder    MIRALAX/GLYCOLAX         prochlorperazine 5 MG tablet    COMPAZINE    30 tablet    Take 1 tablet (5 mg) by mouth every 6 hours as needed (Nausea/Vomiting)       Selenium 200 MCG Tabs      Take 100 mcg by mouth daily. Pt takes one half tab of the 200 mcg daily       tamsulosin 0.4 MG capsule    FLOMAX    90 capsule    TAKE 1 CAPSULE BY MOUTH DAILY       VITAMIN C PO      Take 1,000 mg by mouth daily       VITAMIN D3 PO       Take 1,000 Units by mouth daily       * Notice:  This list has 2 medication(s) that are the same as other medications prescribed for you. Read the directions carefully, and ask your doctor or other care provider to review them with you.

## 2017-02-17 NOTE — PROGRESS NOTES
Diabetes Self Management Training: Follow-up Visit    Roc Parkinson presents today for education and evaluation of glucose control related to Type 2 diabetes.    He is accompanied by self    Patient's diabetes management related comments/concerns: high potassium when checked this week.    Patient would like this visit to be focused around the following diabetes-related behaviors and goals: Healthy Eating    ASSESSMENT:  Patient Problem List reviewed for relevant medical history and current medical status.    Overall, patients blood sugars are looking much improved from last visit. He has been taking his correction as outlined below and checking his blood sugar regularly. Has not been missing any meals and is mindful of his carb intake.  As his fasting numbers are within goal, do not see a need for basal insulin at this time.  His pre lunch and dinner numbers remain elevated so recommend a modest adjustment to his correction scale to provide a bit more coverage at meals. Would leave his bedtime dosing the same for now.     Current Diabetes Management per Patient:  Taking diabetes medications?   yes:     Diabetes Medication(s)     Insulin Sig    insulin aspart (NOVOLOG PEN) 100 UNIT/ML injection Inject 1-5 Units Subcutaneous 4 times daily (with meals and nightly)    Sulfonylureas Sig    glipiZIDE (GLUCOTROL) 10 MG tablet TAKE 1 TABLET BY MOUTH TWICE DAILY BEFORE A MEAL        Current correction scale he is using:  If pre-meal -239 take 1 unit   240-339 take 2 units   340 or greater take 3 units  Check your blood sugar before bedtime and if:   200-299 give 1 unit   300-399 take 2 units   400 or greater take 3 units    Patient glucose self monitoring as follows: 4-5 times daily.   BG results:  Date Breakfast  Lunch  Dinner  Bedtime    Before After Before After Before After    2/17 78 178        2/16 92  189  232  118   2/15 97  Didn't eat  307  190   2/14 96  283  178  140   2/13 135  235  222  218   2/12 122   "  213  239   2/11 109  153  233  103     Overnight readings this past week (2 am): 84, 133, 134, 131, 164, 78, 97    BG values are: in goal mostly in the morning and improving overall. Only 30% of readings >200! He was worried last night because his blood sugar kept decreasing before bed so he had a little juice and kept watching it.     Patient's most recent   Lab Results   Component Value Date    A1C 8.0 11/14/2016    is not meeting goal of <8.0    Nutrition:  Patient eats 3 meals per day. Does not salt food at all. Has not had potatoes lately.     Breakfast - cereal with honey with milk   Lunch - soup with bread and buttermilk or orange juice or milk   Dinner - vegetables with meat and milk or orange juice  Snacks - not really    Beverages: milk, buttermilk, orange juice (reports he just has a small amount of juice when he has it), water    Cultural/Bahai diet restrictions: No     Biggest Challenge to Healthy Eating: knowing what to eat    Physical Activity:    Not assessed    Diabetes Complications:  Acute Complications: At risk for hypoglycemia? yes  Symptoms of low blood sugar? shaking  Frequency of hypoglycemia: none the past 2 weeks  Patient carries a carbohydrate source with them regularly: Yes   Type of carbohydrate: juice/soda    Vitals:  There were no vitals taken for this visit.  Estimated body mass index is 27.95 kg/(m^2) as calculated from the following:    Height as of 2/15/17: 1.778 m (5' 10\").    Weight as of 2/15/17: 88.4 kg (194 lb 12.8 oz).     Last 3 BP:   BP Readings from Last 3 Encounters:   02/15/17 135/60   02/15/17 136/74   02/14/17 128/64       History   Smoking Status     Never Smoker   Smokeless Tobacco     Never Used       Labs:  Lab Results   Component Value Date    A1C 8.0 11/14/2016     Lab Results   Component Value Date     02/15/2017     Lab Results   Component Value Date    LDL 61 05/09/2016    LDL 96.8 08/27/2012     HDL Cholesterol   Date Value Ref Range Status "   05/09/2016 25 (L) >39 mg/dL Final   ]  GFR Estimate   Date Value Ref Range Status   02/15/2017 66 >60 mL/min/1.7m2 Final     Comment:     Non  GFR Calc     GFR Estimate If Black   Date Value Ref Range Status   02/15/2017 80 >60 mL/min/1.7m2 Final     Comment:      GFR Calc     Lab Results   Component Value Date    CR 1.05 02/15/2017     No results found for: MICROALBUMIN    Health Beliefs and Attitudes:   Patient Activation Measure Survey Score:  FANNY Score (Last Two) 10/14/2015 9/28/2016   FANNY Raw Score 40 29   Activation Score 60 52.9   FANNY Level 3 2       Stage of Change: ACTION (Actively working towards change)    Diabetes knowledge and skills assessment:     Patient is knowledgeable in diabetes management concepts related to: Monitoring, Taking Medication and Problem Solving    Patient needs further education on the following diabetes management concepts: Healthy Eating, Taking Medication, Problem Solving, Reducing Risks and Healthy Coping    Barriers to Learning Assessment: No Barriers identified    Based on learning assessment above, most appropriate setting for further diabetes education would be: Individual setting.    INTERVENTION:    Education provided today on:  AADE Self-Care Behaviors:  Healthy Eating: consistency in amount, composition, and timing of food intake and portion control. Educated on not missing meals. He was concerned about his high potassium so reviewed foods high in potassium (orange juice, potatoes, bananas, cereal, etc) and encouraged just reducing his intake of orange juice (unless he has a low BG).     Monitoring: Praised him on checking his blood sugars more regularly and for following his insulin correction scale. Educated that he can stop testing his blood sugar overnight at this time but to continue to check FBS and before meals and bedtime. Instructed to continue to take his correction insulin.     Problem Solving: low blood glucose - causes,  signs/symptoms, treatment and prevention and carrying a carbohydrate source at all times    Opportunities for ongoing education and support in diabetes-self management were discussed.    Pt verbalized understanding of concepts discussed and recommendations provided today.       Education Materials Provided:  Carbohydrate Counting, Hypoglycemia Signs and Symptoms and Potassium Food List    PLAN:  Meal Plan Recommendation: eat 3 meals a day and have small snacks between meals, if needed. Try to not miss meals.   Check blood sugars fasting, before meals and bedtime  Keep a blood glucose record for next visit.  Recommend conservative increase to correction scale insulin - consider changing correction to 1 unit per 85 mg/dL > 140 for before meals and leave bedtime dose the same as he is waking up within target. New proposed meal time correction scale would be:   140-225 - take 1 unit    226-310 - take 2 units   311-395 - take 3 units   396-480 - take 4 units   >481 - take 5 units  Continue bedtime correction of:   200-299 give 1 unit   300-399 take 2 units   400 or greater take 3 units  Will send proposed correction scale to MD and, if in agreement, will update patient.     FOLLOW-UP:  Follow-up appointment scheduled on Mar 3, 2017.  Education topics to cover at the next diabetes education visit(s): review blood sugars and diet  Follow-up with PCP next week.  Chart routed to referring provider.    Ongoing plan for education and support: Written resources (magazines, books, etc.) and Follow-up visit with diabetes educator in 2 weeks    YAZMIN Salguero (Gray) CDE    Time Spent: 30 minutes  Encounter Type: Individual    Any diabetes medication dose changes were made via the CDE Protocol and Collaborative Practice Agreement with the patient's referring provider. A copy of this encounter was shared with the provider.    **Update, MD in agreement with new correction scale. Pt called but did not answer. Left vm as pt gave  verbal permission to leave vm as he was going to be at a  this afternoon. Explained new correction scale in the message and provided with CDE number to call back if questions. Reiterated keeping his bedtime dosing the same at this time.

## 2017-02-19 DIAGNOSIS — E78.5 HYPERLIPIDEMIA: ICD-10-CM

## 2017-02-19 DIAGNOSIS — E03.9 HYPOTHYROIDISM: ICD-10-CM

## 2017-02-20 ENCOUNTER — OFFICE VISIT (OUTPATIENT)
Dept: FAMILY MEDICINE | Facility: CLINIC | Age: 82
End: 2017-02-20
Payer: COMMERCIAL

## 2017-02-20 VITALS
WEIGHT: 194 LBS | SYSTOLIC BLOOD PRESSURE: 124 MMHG | RESPIRATION RATE: 14 BRPM | TEMPERATURE: 98 F | DIASTOLIC BLOOD PRESSURE: 76 MMHG | HEART RATE: 68 BPM | BODY MASS INDEX: 27.84 KG/M2

## 2017-02-20 DIAGNOSIS — N18.30 TYPE 2 DIABETES MELLITUS WITH STAGE 3 CHRONIC KIDNEY DISEASE, WITH LONG-TERM CURRENT USE OF INSULIN (H): Primary | ICD-10-CM

## 2017-02-20 DIAGNOSIS — E11.22 TYPE 2 DIABETES MELLITUS WITH STAGE 3 CHRONIC KIDNEY DISEASE, WITH LONG-TERM CURRENT USE OF INSULIN (H): Primary | ICD-10-CM

## 2017-02-20 DIAGNOSIS — Z79.4 TYPE 2 DIABETES MELLITUS WITH STAGE 3 CHRONIC KIDNEY DISEASE, WITH LONG-TERM CURRENT USE OF INSULIN (H): Primary | ICD-10-CM

## 2017-02-20 DIAGNOSIS — C90.00 MULTIPLE MYELOMA NOT HAVING ACHIEVED REMISSION (H): ICD-10-CM

## 2017-02-20 DIAGNOSIS — E78.5 HYPERLIPIDEMIA: ICD-10-CM

## 2017-02-20 PROCEDURE — 99213 OFFICE O/P EST LOW 20 MIN: CPT | Performed by: FAMILY MEDICINE

## 2017-02-20 RX ORDER — GEMFIBROZIL 600 MG/1
TABLET, FILM COATED ORAL
Qty: 60 TABLET | Refills: 0 | Status: SHIPPED | OUTPATIENT
Start: 2017-02-20 | End: 2017-02-20

## 2017-02-20 RX ORDER — LEVOTHYROXINE SODIUM 25 UG/1
TABLET ORAL
Qty: 90 TABLET | Refills: 1 | Status: SHIPPED | OUTPATIENT
Start: 2017-02-20 | End: 2017-01-01

## 2017-02-20 NOTE — TELEPHONE ENCOUNTER
LEVOTHYROXINE 0.025MG (25MCG) TAB     Last Written Prescription Date: 2/29/2016  Last Quantity: 90, # refills: 2  Last Office Visit with G, P or [facility] prescribing provider: 12/7/2016   Next 5 appointments (look out 90 days)     Feb 20, 2017 10:30 AM CST   SHORT with Dickson Reich MD   [facility] ([facility])    [address] [place] [address]   [phone]            Mar 15, 2017  9:45 AM CDT   Return Visit with Gretel Quinn MD   [facility] ([facility])    [facility]  [address]  [facility] [address] [phone]                   TSH   Date Value Ref Range Status   09/28/2016 5.53 (H) 0.40 - 5.00 mU/L Final

## 2017-02-20 NOTE — PROGRESS NOTES
SUBJECTIVE:                                                    Roc Parkinson is a 89 year old male who presents to clinic today for the following health issues:      Diabetes Follow-up    Patient is checking blood sugars: 4 times daily.    Blood sugar testing frequency justification: Adjustment of medication(s)  Results are as follows:             Diabetic concerns: None and blood sugar frequently over 200     Symptoms of hypoglycemia (low blood sugar): none     Paresthesias (numbness or burning in feet) or sores: No     Date of last diabetic eye exam: 5 weeks ago     Hypertension Follow-up      Outpatient blood pressures are not being checked.    Low Salt Diet: no added salt       Amount of exercise or physical activity: None    Problems taking medications regularly: No    Medication side effects: none  Diet: regular (no restrictions)        Problem list and histories reviewed & adjusted, as indicated.  Additional history: as documented    Problem list, Medication list, Allergies, and Medical/Social/Surgical histories reviewed in EPIC and updated as appropriate.    ROS:  Constitutional, HEENT, cardiovascular, pulmonary, gi and gu systems are negative, except as otherwise noted.    OBJECTIVE:                                                    /76  Pulse 68  Temp 98  F (36.7  C) (Tympanic)  Resp 14  Wt 194 lb (88 kg)  BMI 27.84 kg/m2  Body mass index is 27.84 kg/(m^2).  GENERAL APPEARANCE: healthy, alert and no distress  Blood sugars are improving on the new regimen with only one over 200 on the weekend.         ASSESSMENT/PLAN:                                                        ICD-10-CM    1. Type 2 diabetes mellitus with stage 3 chronic kidney disease, with long-term current use of insulin (H) E11.22     N18.3     Z79.4    2. Multiple myeloma not having achieved remission (H) C90.00        Patient Instructions   Will see back in one month on blood pressure and diabetes. No changes in  medications today.      Dickson Reich MD  [facility]

## 2017-02-20 NOTE — TELEPHONE ENCOUNTER
Medication is being filled for 1 time refill only due to:  Patient needs labs cholesterol labs. orders placed.

## 2017-02-20 NOTE — MR AVS SNAPSHOT
After Visit Summary   2/20/2017    Roc Parkinson    MRN: [id number]           Patient Information     Date Of Birth          7/7/1926        Visit Information        Provider Department      2/20/2017 10:30 AM Dickson Reich MD [facility]        Today's Diagnoses     Type 2 diabetes mellitus with stage 3 chronic kidney disease, with long-term current use of insulin (H)    -  1    Multiple myeloma not having achieved remission (H)          Care Instructions    Will see back in one month on blood pressure and diabetes. No changes in medications today.        Follow-ups after your visit        Follow-up notes from your care team     Return in about 4 weeks (around 3/20/2017) for Routine Visit, Lab Work, BP Recheck.      Your next 10 appointments already scheduled     Feb 21, 2017  2:30 PM CST   Cystoscopy with Radha Jackson MD, [facility] [place] ([facility])    [address]  [address]  [facility] [address]   [phone]            Feb 22, 2017 10:30 AM CST   Level 4 with Olive CHAIR 4   [facility] ([facility])    [facility]  [address]  [facility] [address]   [phone]            Mar 01, 2017 10:30 AM CST   Level 4 with Olive CHAIR 4   [facility] ([facility])    [facility]  [address]  [facility] [address]   [phone]            Mar 03, 2017  9:30 AM CST   Diabetic Education with  DIABETIC ED RESOURCE   [facility]    [address] [address]   [phone]            Mar 08, 2017  9:30 AM CST   Level 5 with [facility] 4   [facility] ([facility])    [facility]  [address]  [place] [address]   [phone]            Mar 15, 2017  9:30 AM CDT   Level 5 with NORTH CHAIR 7   [facility] ([facility])    [facility] [address]  [place] [address]   [phone]            Mar 15, 2017  9:45 AM CDT   Return Visit with Gretel Quinn MD   [facility] ([facility])    [facility] [address]  [place] [address]   [phone]            Mar 22, 2017  9:30 AM CDT   Level 5 with NORTH CHAIR 5   [facility] ([facility])    [facility] [address]  [place] [address]   [phone]              Who to contact     If you have questions or need follow up information about today's clinic visit or your schedule please contact [facility] directly at [phone].  Normal or non-critical lab and imaging results will be communicated to you by [email or web address], letter or phone within 4 business days after the clinic has received the results. If you do not hear from us within 7 days, please contact the clinic through [email or web address] or phone. If you have a critical or abnormal lab result, we will notify you by phone as soon as possible.  Submit refill requests through "[employer] or call your pharmacy and they will forward the refill request to us. Please allow 3 business days for your refill to be completed.          Additional Information About Your Visit        "[employer] Information     "[employer] lets you send messages to your doctor, view your test results, renew your prescriptions, schedule appointments and more. To sign up, go to [email or web address] . Click on \"Log in\" on the left side of the screen, which will take you to the Welcome page. Then click on \"Sign up Now\" on the right side of the page.     You will be asked to enter " the access code listed below, as well as some personal information. Please follow the directions to create your username and password.     Your access code is: [email or web address]  Expires: 3/28/2017 12:15 PM     Your access code will  in 90 days. If you need help or a new code, please call your [facility] clinic or [phone].        Care EveryWhere ID     This is your Care EveryWhere ID. This could be used by other organizations to access your [facility] medical records  [id number]        Your Vitals Were     Pulse Temperature Respirations BMI (Body Mass Index)          68 98  F (36.7  C) (Tympanic) 14 27.84 kg/m2         Blood Pressure from Last 3 Encounters:   17 124/76   02/15/17 135/60   02/15/17 136/74    Weight from Last 3 Encounters:   17 194 lb (88 kg)   02/15/17 194 lb 12.8 oz (88.4 kg)   02/15/17 194 lb 12.8 oz (88.4 kg)              Today, you had the following     No orders found for display       Primary Care Provider Office Phone # Fax #    Dickson Reich [phone]       [facility] [address] [facility] [address]        Thank you!     Thank you for choosing [facility]  for your care. Our goal is always to provide you with excellent care. Hearing back from our patients is one way we can continue to improve our services. Please take a few minutes to complete the written survey that you may receive in the mail after your visit with us. Thank you!             Your Updated Medication List - Protect others around you: Learn how to safely use, store and throw away your medicines at www.disposemymeds.org.          This list is accurate as of: 17 10:44 AM.  Always use your most recent med list.                   Brand Name Dispense Instructions for use    acyclovir 400 MG tablet    ZOVIRAX    60 tablet    Take 1 tablet (400 mg) by mouth 2 times daily Viral Prophylaxis.       amLODIPine 5 MG tablet    NORVASC    30  tablet    TAKE 1 TABLET BY MOUTH DAILY       ciprofloxacin 250 MG tablet    CIPRO    20 tablet    Take 1 tablet two times daily for 10 days.       clotrimazole-betamethasone cream    LOTRISONE    30 g    Apply topically to the head of the penis two times a day for up to 2 weeks.       dexamethasone 4 MG tablet    DECADRON    40 tablet    Take 10 tablets (40 mg) by mouth every 7 days for 4 doses Take daily on Days 1, 8, 15, and 22. Cycles 3 and 4 ONLY.       ferrous sulfate 325 (65 FE) MG tablet    IRON     Take 325 mg by mouth daily (with breakfast)       FREESTYLE LITE test strip   Generic drug:  blood glucose monitoring     200 each    USE TO TEST BLOOD GLUCOSE TWICE DAILY       gemfibrozil 600 MG tablet    LOPID    180 tablet    TAKE 1 TABLET BY MOUTH TWICE DAILY       glipiZIDE 10 MG tablet    GLUCOTROL    180 tablet    TAKE 1 TABLET BY MOUTH TWICE DAILY BEFORE A MEAL       insulin aspart 100 UNIT/ML injection    NovoLOG PEN    12 mL    Inject 1-5 Units Subcutaneous 4 times daily (with meals and nightly) Before meal correction: 140-225 - take 1 unit  226-310 - take 2 units 311-395 - take 3 units 396-480 - take 4 units >481 - take 5 units Bedtime correction of: 200-299 give 1 unit 300-399 take 2 units 400 or greater take 3 units       * insulin pen needle 31G X 6 MM     100 each    Use 2 x daily or as directed.       * B-D U/F 31G X 8 MM   Generic drug:  insulin pen needle          levothyroxine 25 MCG tablet    SYNTHROID/LEVOTHROID    90 tablet    TAKE 1 TABLET BY MOUTH EVERY DAY       lisinopril 30 MG tablet    PRINIVIL,ZESTRIL    90 tablet    TAKE 1 TABLET BY MOUTH EVERY DAY       metoprolol 25 MG tablet    LOPRESSOR    180 tablet    TAKE 1 TABLET BY MOUTH TWICE DAILY       omeprazole 20 MG CR capsule    priLOSEC    90 capsule    TAKE 1 CAPSULE BY MOUTH EVERY DAY       ondansetron 8 MG tablet    ZOFRAN    10 tablet    Take 1 tablet (8 mg) by mouth every 8 hours as needed (Nausea/Vomiting)       polyethylene  glycol powder    MIRALAX/GLYCOLAX         prochlorperazine 5 MG tablet    COMPAZINE    30 tablet    Take 1 tablet (5 mg) by mouth every 6 hours as needed (Nausea/Vomiting)       Selenium 200 MCG Tabs      Take 100 mcg by mouth daily. Pt takes one half tab of the 200 mcg daily       tamsulosin 0.4 MG capsule    FLOMAX    90 capsule    TAKE 1 CAPSULE BY MOUTH DAILY       VITAMIN C PO      Take 1,000 mg by mouth daily       VITAMIN D3 PO      Take 1,000 Units by mouth daily       * Notice:  This list has 2 medication(s) that are the same as other medications prescribed for you. Read the directions carefully, and ask your doctor or other care provider to review them with you.

## 2017-02-20 NOTE — TELEPHONE ENCOUNTER
Prescription approved per [facility] Refill Protocol.  Hannah Reece RN- Triage [employer]

## 2017-02-20 NOTE — NURSING NOTE
"Chief Complaint   Patient presents with     Diabetes     Hypertension       Initial /76  Pulse 68  Temp 98  F (36.7  C) (Tympanic)  Resp 14  Wt 194 lb (88 kg)  BMI 27.84 kg/m2 Estimated body mass index is 27.84 kg/(m^2) as calculated from the following:    Height as of 2/15/17: 5' 10\" (1.778 m).    Weight as of this encounter: 194 lb (88 kg).  Medication Reconciliation: complete     Mariia Allen CMA      "

## 2017-02-20 NOTE — TELEPHONE ENCOUNTER
GEMFIBROZIL 600MG TABLETS       Last Written Prescription Date: 4/15/2016  Last Fill Quantity: 180, # refills: 2    Last Office Visit with [facility], [facility] or [facility] prescribing provider:  12/7/2016   Future Office Visit:    Next 5 appointments (look out 90 days)     Feb 20, 2017 10:30 AM CST   [facility]   [facility]    [address] [place] [address]   [phone]            Mar 15, 2017  9:45 AM CDT   Return Visit with Gretel Quinn MD   [facility])    [facility]  [address] [address] [phone]                  Cholesterol   Date Value Ref Range Status   05/09/2016 106 <200 mg/dL Final     HDL Cholesterol   Date Value Ref Range Status   05/09/2016 25 (L) >39 mg/dL Final     LDL Cholesterol Calculated   Date Value Ref Range Status   05/09/2016 61 <100 mg/dL Final     Comment:     Desirable:       <100 mg/dl     LDL Cholesterol Direct   Date Value Ref Range Status   08/27/2012 96.8 0.0 - 100.0 mg/dL Final     Triglycerides   Date Value Ref Range Status   05/09/2016 102 <150 mg/dL Final     Comment:     Fasting specimen     Cholesterol/HDL Ratio   Date Value Ref Range Status   10/14/2015 4.7 0.0 - 5.0 Final     ALT   Date Value Ref Range Status   02/15/2017 22 0 - 70 U/L Final

## 2017-02-21 ENCOUNTER — OFFICE VISIT (OUTPATIENT)
Dept: UROLOGY | Facility: CLINIC | Age: 82
End: 2017-02-21
Payer: COMMERCIAL

## 2017-02-21 VITALS
SYSTOLIC BLOOD PRESSURE: 124 MMHG | WEIGHT: 194 LBS | DIASTOLIC BLOOD PRESSURE: 64 MMHG | HEART RATE: 86 BPM | BODY MASS INDEX: 27.77 KG/M2 | HEIGHT: 70 IN

## 2017-02-21 DIAGNOSIS — R31.9 HEMATURIA: Primary | ICD-10-CM

## 2017-02-21 PROCEDURE — 99213 OFFICE O/P EST LOW 20 MIN: CPT | Performed by: UROLOGY

## 2017-02-21 RX ORDER — GEMFIBROZIL 600 MG/1
TABLET, FILM COATED ORAL
Qty: 180 TABLET | Refills: 0 | Status: SHIPPED | OUTPATIENT
Start: 2017-02-21 | End: 2017-01-01

## 2017-02-21 ASSESSMENT — PAIN SCALES - GENERAL: PAINLEVEL: NO PAIN (0)

## 2017-02-21 NOTE — PROGRESS NOTES
Office Visit Note      UROLOGIC DIAGNOSES:   Blood at meatus     CURRENT INTERVENTIONS:       HISTORY:   Patient with history of prostate cancer and bladder cancer.   Had blood at meatus, no gross hematuria per patient.   He was to have had cystoscopy today but he defers at the moment.     We discussed importance of cystoscopy. Also discussed history of prostate cancer and bladder cancer.      We also discussed phimosis, circumcision, and foreskin dilation.       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       PAST SURGICAL HISTORY:   Past Surgical History   Procedure Laterality Date     Colonoscopy  2007     Orthopedic surgery       lt knee in nov.2009     Arthroplasty knee  11/5/2012     Procedure: ARTHROPLASTY KNEE;  RIGHT TOTAL KNEE ARTHROPLASTY (SMITH & NEPHEW)^;  Surgeon: Dickson Schulte MD;  Location:  OR     Biopsy       bladder     Genitourinary surgery       TURP x2 and bladder scraping     Gi surgery       anal fistula     Soft tissue surgery       melanoma     Colonoscopy N/A 11/15/2016     Procedure: COMBINED COLONOSCOPY, SINGLE OR MULTIPLE BIOPSY/POLYPECTOMY BY BIOPSY;  Surgeon: Kenneth Fulton MD;  Location:  GI       FAMILY HISTORY:   Family History   Problem Relation Age of Onset     Cardiovascular Father 81     heart arrythmia     Endocrine Disease Brother 74     Paget's       SOCIAL HISTORY:   Social History   Substance Use Topics     Smoking status: Never Smoker     Smokeless tobacco: Never Used     Alcohol use No       Current Outpatient Prescriptions   Medication     gemfibrozil (LOPID) 600 MG tablet     levothyroxine (SYNTHROID/LEVOTHROID) 25 MCG tablet     insulin aspart (NOVOLOG PEN) 100 UNIT/ML injection     dexamethasone  "(DECADRON) 4 MG tablet     amLODIPine (NORVASC) 5 MG tablet     B-D U/F 31G X 8 MM insulin pen needle     polyethylene glycol (MIRALAX/GLYCOLAX) powder     ciprofloxacin (CIPRO) 250 MG tablet     insulin pen needle 31G X 6 MM     clotrimazole-betamethasone (LOTRISONE) cream     acyclovir (ZOVIRAX) 400 MG tablet     prochlorperazine (COMPAZINE) 5 MG tablet     ondansetron (ZOFRAN) 8 MG tablet     FREESTYLE LITE test strip     glipiZIDE (GLUCOTROL) 10 MG tablet     Ascorbic Acid (VITAMIN C PO)     Cholecalciferol (VITAMIN D3 PO)     ferrous sulfate (IRON) 325 (65 FE) MG tablet     lisinopril (PRINIVIL,ZESTRIL) 30 MG tablet     tamsulosin (FLOMAX) 0.4 MG 24 hr capsule     metoprolol (LOPRESSOR) 25 MG tablet     omeprazole (PRILOSEC) 20 MG capsule     Selenium 200 MCG TABS     No current facility-administered medications for this visit.          PHYSICAL EXAM:    /64 (BP Location: Right arm)  Pulse 86  Ht 1.778 m (5' 10\")  Wt 88 kg (194 lb)  BMI 27.84 kg/m2    HEENT: Normocephalic and atraumatic   Cardiac: Not done  Back/Flank: Not done  CNS/PNS: Not done  Respiratory: Normal non-labored breathing  Abdomen: Soft nontender and nondistended  Peripheral Vascular: Not done  Mental Status: Not done    Penis: phimotic foreskin, no maceration, no blood   Scrotal Skin: Not done  Testicles: Not done  Epididymis: Not done  Digital Rectal Exam:     Cystoscopy: Not done    Imaging: None    Urinalysis: UA RESULTS:  Recent Labs   Lab Test  02/13/17   1530  01/06/17   1427   COLOR  Yellow  Yellow   APPEARANCE  Clear  Clear   URINEGLC  150*  >=1000*   URINEBILI  Negative  Negative   URINEKETONE  Negative  Negative   SG  1.011  1.010   UBLD  Large*  Trace*   URINEPH  5.5  5.5   PROTEIN  30*  Negative   UROBILINOGEN   --   0.2   NITRITE  Negative  Negative   LEUKEST  Moderate*  Negative   RBCU  >182*   --    WBCU  <1   --        PSA:     Post Void Residual:     Other labs: None today      IMPRESSION:  89 y/o M with blood at meatus "     PLAN:  Per patient, will schedule for cystoscopy in the office in one month     Total Time: 15 minutes                                       Total in Consultation: greater than 50%

## 2017-02-21 NOTE — LETTER
2/21/2017       RE: Roc Parkinson  [address]   [facility] [address]     Dear Colleague,    Thank you for referring your patient, Roc Parkinson, to the [facility] [place] at [facility]. Please see a copy of my visit note below.    Office Visit Note      UROLOGIC DIAGNOSES:   Blood at meatus     CURRENT INTERVENTIONS:       HISTORY:   Patient with history of prostate cancer and bladder cancer.   Had blood at meatus, no gross hematuria per patient.   He was to have had cystoscopy today but he defers at the moment.     We discussed importance of cystoscopy. Also discussed history of prostate cancer and bladder cancer.      We also discussed phimosis, circumcision, and foreskin dilation.       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       PAST SURGICAL HISTORY:   Past Surgical History   Procedure Laterality Date     Colonoscopy  2007     Orthopedic surgery       lt knee in nov.2009     Arthroplasty knee  11/5/2012     Procedure: ARTHROPLASTY KNEE;  RIGHT TOTAL KNEE ARTHROPLASTY (SMITH & NEPHEW)^;  Surgeon: Dickson Schulte MD;  Location:  OR     Biopsy       bladder     Genitourinary surgery       TURP x2 and bladder scraping     Gi surgery       anal fistula     Soft tissue surgery       melanoma     Colonoscopy N/A 11/15/2016     Procedure: COMBINED COLONOSCOPY, SINGLE OR MULTIPLE BIOPSY/POLYPECTOMY BY BIOPSY;  Surgeon: Kenneth Fulton MD;  Location:  GI       FAMILY HISTORY:   Family History   Problem Relation Age of Onset     Cardiovascular Father 81     heart arrythmia     Endocrine Disease Brother 74     Paget's       SOCIAL HISTORY:   Social History  "  Substance Use Topics     Smoking status: Never Smoker     Smokeless tobacco: Never Used     Alcohol use No       Current Outpatient Prescriptions   Medication     gemfibrozil (LOPID) 600 MG tablet     levothyroxine (SYNTHROID/LEVOTHROID) 25 MCG tablet     insulin aspart (NOVOLOG PEN) 100 UNIT/ML injection     dexamethasone (DECADRON) 4 MG tablet     amLODIPine (NORVASC) 5 MG tablet     B-D U/F 31G X 8 MM insulin pen needle     polyethylene glycol (MIRALAX/GLYCOLAX) powder     ciprofloxacin (CIPRO) 250 MG tablet     insulin pen needle 31G X 6 MM     clotrimazole-betamethasone (LOTRISONE) cream     acyclovir (ZOVIRAX) 400 MG tablet     prochlorperazine (COMPAZINE) 5 MG tablet     ondansetron (ZOFRAN) 8 MG tablet     FREESTYLE LITE test strip     glipiZIDE (GLUCOTROL) 10 MG tablet     Ascorbic Acid (VITAMIN C PO)     Cholecalciferol (VITAMIN D3 PO)     ferrous sulfate (IRON) 325 (65 FE) MG tablet     lisinopril (PRINIVIL,ZESTRIL) 30 MG tablet     tamsulosin (FLOMAX) 0.4 MG 24 hr capsule     metoprolol (LOPRESSOR) 25 MG tablet     omeprazole (PRILOSEC) 20 MG capsule     Selenium 200 MCG TABS     No current facility-administered medications for this visit.          PHYSICAL EXAM:    /64 (BP Location: Right arm)  Pulse 86  Ht 1.778 m (5' 10\")  Wt 88 kg (194 lb)  BMI 27.84 kg/m2    HEENT: Normocephalic and atraumatic   Cardiac: Not done  Back/Flank: Not done  CNS/PNS: Not done  Respiratory: Normal non-labored breathing  Abdomen: Soft nontender and nondistended  Peripheral Vascular: Not done  Mental Status: Not done    Penis: phimotic foreskin, no maceration, no blood   Scrotal Skin: Not done  Testicles: Not done  Epididymis: Not done  Digital Rectal Exam:     Cystoscopy: Not done    Imaging: None    Urinalysis: UA RESULTS:  Recent Labs   Lab Test  02/13/17   1530  01/06/17   1427   COLOR  Yellow  Yellow   APPEARANCE  Clear  Clear   URINEGLC  150*  >=1000*   URINEBILI  Negative  Negative   URINEKETONE  Negative  " Negative   SG  1.011  1.010   UBLD  Large*  Trace*   URINEPH  5.5  5.5   PROTEIN  30*  Negative   UROBILINOGEN   --   0.2   NITRITE  Negative  Negative   LEUKEST  Moderate*  Negative   RBCU  >182*   --    WBCU  <1   --        PSA:     Post Void Residual:     Other labs: None today    IMPRESSION:  89 y/o M with blood at meatus     PLAN:  Per patient, will schedule for cystoscopy in the office in one month     Total Time: 15 minutes                                       Total in Consultation: greater than 50%     Radha Jackson MD

## 2017-02-21 NOTE — MR AVS SNAPSHOT
After Visit Summary   2/21/2017    Roc Parkinson    MRN: [id number]           Patient Information     Date Of Birth          7/7/1926        Visit Information        Provider Department      2/21/2017 2:30 PM Radha Jackson MD; [facility] [place]         Follow-ups after your visit        Your next 10 appointments already scheduled     Feb 22, 2017 10:30 AM CST   Level 4 with NORTH CHAIR 4   [facility] ([facility])    [facility] [place]  [address]  [place] [address]   [phone]            Mar 01, 2017 10:30 AM CST   Level 4 with NORTH CHAIR 4   [facility] ([facility])    [facility] [place]  [address]  [place] [address]   [phone]            Mar 06, 2017 10:30 AM CST   Diabetic Education with  DIABETIC ED RESOURCE   [facility] ([facility])    [address] [place] [address]   [phone]            Mar 08, 2017  9:30 AM CST   Level 5 with NORTH CHAIR 4   [facility] ([facility])    [facility] [place]  [address]  [place] [address]   [phone]            Mar 15, 2017  9:30 AM CDT   Level 5 with NORTH CHAIR 7   [facility] ([facility])    [facility] [place]  [address]  [place] [address]   [phone]            Mar 15, 2017  9:45 AM CDT   Return Visit with Gretel Quinn MD   [facility] ([facility])    [facility] [place]  [address]  [place] [address]   [phone]            Mar 20, 2017  9:00 AM CDT   SHORT with Dickson Reich MD   [facility]  "[facility]    [address]  [address]  [facility] [address]   [phone]            Mar 21, 2017 10:00 AM CDT   Cystoscopy with Radha Jackson MD, [facility] [place] ([facility] [place])    [address]  [address]  [facility] [id number] [phone]            Mar 22, 2017  9:30 AM CDT   Level 5 with NORTH CHAIR 5   [facility] ([facility])    [facility]  [address]  [facility] [id number] [phone]              Who to contact     If you have questions or need follow up information about today's clinic visit or your schedule please contact [facility] [place] directly at [phone].  Normal or non-critical lab and imaging results will be communicated to you by HiperScanhart, letter or phone within 4 business days after the clinic has received the results. If you do not hear from us within 7 days, please contact the clinic through Appeart or phone. If you have a critical or abnormal lab result, we will notify you by phone as soon as possible.  Submit refill requests through SFOX or call your pharmacy and they will forward the refill request to us. Please allow 3 business days for your refill to be completed.          Additional Information About Your Visit        SFOX Information     SFOX lets you send messages to your doctor, view your test results, renew your prescriptions, schedule appointments and more. To sign up, go to [email or web address] . Click on \"Log in\" on the left side of the screen, which will take you to the Welcome page. Then click on \"Sign up Now\" on the right side of the page.     You will be asked to enter the access code listed below, as well as some personal information. Please follow the directions to create your username and password.     Your access code is: " [email or web address]  Expires: 3/28/2017 12:15 PM     Your access code will  in 90 days. If you need help or a new code, please call your [place] clinic or [phone].        Care EveryWhere ID     This is your Care EveryWhere ID. This could be used by other organizations to access your [place] medical records  [id number]        Your Vitals Were     Pulse Height BMI (Body Mass Index)             86 1.778 m (5' 10\") 27.84 kg/m2          Blood Pressure from Last 3 Encounters:   17 124/64   17 124/76   02/15/17 135/60    Weight from Last 3 Encounters:   17 88 kg (194 lb)   17 88 kg (194 lb)   02/15/17 88.4 kg (194 lb 12.8 oz)              Today, you had the following     No orders found for display       Primary Care Provider Office Phone # Fax #    Dickson Reich [phone]       [facility] [address] [facility] [address]        Thank you!     Thank you for choosing [facility] [place]  for your care. Our goal is always to provide you with excellent care. Hearing back from our patients is one way we can continue to improve our services. Please take a few minutes to complete the written survey that you may receive in the mail after your visit with us. Thank you!             Your Updated Medication List - Protect others around you: Learn how to safely use, store and throw away your medicines at www.disposemymeds.org.          This list is accurate as of: 17  3:00 PM.  Always use your most recent med list.                   Brand Name Dispense Instructions for use    acyclovir 400 MG tablet    ZOVIRAX    60 tablet    Take 1 tablet (400 mg) by mouth 2 times daily Viral Prophylaxis.       amLODIPine 5 MG tablet    NORVASC    30 tablet    TAKE 1 TABLET BY MOUTH DAILY       ciprofloxacin 250 MG tablet    CIPRO    20 tablet    Take 1 tablet two times daily for 10 days.       clotrimazole-betamethasone cream    " LOTRISONE    30 g    Apply topically to the head of the penis two times a day for up to 2 weeks.       dexamethasone 4 MG tablet    DECADRON    40 tablet    Take 10 tablets (40 mg) by mouth every 7 days for 4 doses Take daily on Days 1, 8, 15, and 22. Cycles 3 and 4 ONLY.       ferrous sulfate 325 (65 FE) MG tablet    IRON     Take 325 mg by mouth daily (with breakfast)       FREESTYLE LITE test strip   Generic drug:  blood glucose monitoring     200 each    USE TO TEST BLOOD GLUCOSE TWICE DAILY       gemfibrozil 600 MG tablet    LOPID    60 tablet    TAKE 1 TABLET BY MOUTH TWICE DAILY       glipiZIDE 10 MG tablet    GLUCOTROL    180 tablet    TAKE 1 TABLET BY MOUTH TWICE DAILY BEFORE A MEAL       insulin aspart 100 UNIT/ML injection    NovoLOG PEN    12 mL    Inject 1-5 Units Subcutaneous 4 times daily (with meals and nightly) Before meal correction: 140-225 - take 1 unit  226-310 - take 2 units 311-395 - take 3 units 396-480 - take 4 units >481 - take 5 units Bedtime correction of: 200-299 give 1 unit 300-399 take 2 units 400 or greater take 3 units       * insulin pen needle 31G X 6 MM     100 each    Use 2 x daily or as directed.       * B-D U/F 31G X 8 MM   Generic drug:  insulin pen needle          levothyroxine 25 MCG tablet    SYNTHROID/LEVOTHROID    90 tablet    TAKE 1 TABLET BY MOUTH EVERY DAY.       lisinopril 30 MG tablet    PRINIVIL,ZESTRIL    90 tablet    TAKE 1 TABLET BY MOUTH EVERY DAY       metoprolol 25 MG tablet    LOPRESSOR    180 tablet    TAKE 1 TABLET BY MOUTH TWICE DAILY       omeprazole 20 MG CR capsule    priLOSEC    90 capsule    TAKE 1 CAPSULE BY MOUTH EVERY DAY       ondansetron 8 MG tablet    ZOFRAN    10 tablet    Take 1 tablet (8 mg) by mouth every 8 hours as needed (Nausea/Vomiting)       polyethylene glycol powder    MIRALAX/GLYCOLAX         prochlorperazine 5 MG tablet    COMPAZINE    30 tablet    Take 1 tablet (5 mg) by mouth every 6 hours as needed (Nausea/Vomiting)        Selenium 200 MCG Tabs      Take 100 mcg by mouth daily. Pt takes one half tab of the 200 mcg daily       tamsulosin 0.4 MG capsule    FLOMAX    90 capsule    TAKE 1 CAPSULE BY MOUTH DAILY       VITAMIN C PO      Take 1,000 mg by mouth daily       VITAMIN D3 PO      Take 1,000 Units by mouth daily       * Notice:  This list has 2 medication(s) that are the same as other medications prescribed for you. Read the directions carefully, and ask your doctor or other care provider to review them with you.

## 2017-02-21 NOTE — TELEPHONE ENCOUNTER
gemfibrozol ( lopid) 600mg     Last Written Prescription Date: 2/20/17  Last Fill Quantity: 180, # refills: 0    Last Office Visit with G, P or [facility] prescribing provider: Future Office Visit:    Next 5 appointments (look out 90 days)     Mar 15, 2017  9:45 AM CDT   Return Visit with Gretel Quinn MD   [facility] ([facility])    [facility]  [address]  [facility] [address]   [phone]            Mar 20, 2017  9:00 AM CDT   SHORT with Dickson Reich MD   [facility] ([facility])    [address] [place] [id number] [phone]                  Cholesterol   Date Value Ref Range Status   05/09/2016 106 <200 mg/dL Final     HDL Cholesterol   Date Value Ref Range Status   05/09/2016 25 (L) >39 mg/dL Final     LDL Cholesterol Calculated   Date Value Ref Range Status   05/09/2016 61 <100 mg/dL Final     Comment:     Desirable:       <100 mg/dl     LDL Cholesterol Direct   Date Value Ref Range Status   08/27/2012 96.8 0.0 - 100.0 mg/dL Final     Triglycerides   Date Value Ref Range Status   05/09/2016 102 <150 mg/dL Final     Comment:     Fasting specimen     Cholesterol/HDL Ratio   Date Value Ref Range Status   10/14/2015 4.7 0.0 - 5.0 Final     ALT   Date Value Ref Range Status   02/15/2017 22 0 - 70 U/L Final

## 2017-02-21 NOTE — NURSING NOTE
Chief Complaint   Patient presents with     Cystoscopy     Prior to the start of the procedure and with procedural staff participation, I verbally confirmed the patient s identity using two indicators, relevant allergies, that the procedure was appropriate and matched the consent or emergent situation, and that the correct equipment/implants were available. Immediately prior to starting the procedure I conducted the Time Out with the procedural staff and re-confirmed the patient s name, procedure, and site/side. (The Joint Commission universal protocol was followed.)  Yes    Sedation (Moderate or Deep): None  Gabbi Mcarthur LPN

## 2017-02-22 ENCOUNTER — INFUSION THERAPY VISIT (OUTPATIENT)
Dept: INFUSION THERAPY | Facility: CLINIC | Age: 82
End: 2017-02-22
Attending: INTERNAL MEDICINE
Payer: MEDICARE

## 2017-02-22 ENCOUNTER — HOSPITAL ENCOUNTER (OUTPATIENT)
Facility: CLINIC | Age: 82
Setting detail: SPECIMEN
Discharge: HOME | End: 2017-02-22
Attending: INTERNAL MEDICINE | Admitting: INTERNAL MEDICINE
Payer: MEDICARE

## 2017-02-22 VITALS
SYSTOLIC BLOOD PRESSURE: 142 MMHG | DIASTOLIC BLOOD PRESSURE: 72 MMHG | HEART RATE: 80 BPM | TEMPERATURE: 97.6 F | OXYGEN SATURATION: 98 % | RESPIRATION RATE: 14 BRPM

## 2017-02-22 DIAGNOSIS — E83.52 HYPERCALCEMIA: ICD-10-CM

## 2017-02-22 DIAGNOSIS — C90.00 MULTIPLE MYELOMA NOT HAVING ACHIEVED REMISSION (H): Primary | ICD-10-CM

## 2017-02-22 DIAGNOSIS — D53.9 MACROCYTIC ANEMIA: ICD-10-CM

## 2017-02-22 DIAGNOSIS — E87.5 HYPERKALEMIA: ICD-10-CM

## 2017-02-22 LAB
ABO + RH BLD: NORMAL
ABO + RH BLD: NORMAL
BASOPHILS # BLD AUTO: 0 10E9/L (ref 0–0.2)
BASOPHILS NFR BLD AUTO: 0 %
BLD GP AB SCN SERPL QL: NORMAL
BLD PROD TYP BPU: NORMAL
BLD PROD TYP BPU: NORMAL
BLD UNIT ID BPU: 0
BLOOD BANK CMNT PATIENT-IMP: NORMAL
BLOOD PRODUCT CODE: NORMAL
BPU ID: NORMAL
CALCIUM SERPL-MCNC: 9.5 MG/DL (ref 8.5–10.1)
CREAT SERPL-MCNC: 0.92 MG/DL (ref 0.66–1.25)
DIFFERENTIAL METHOD BLD: ABNORMAL
EOSINOPHIL # BLD AUTO: 0 10E9/L (ref 0–0.7)
EOSINOPHIL NFR BLD AUTO: 0.4 %
ERYTHROCYTE [DISTWIDTH] IN BLOOD BY AUTOMATED COUNT: 25.7 % (ref 10–15)
GFR SERPL CREATININE-BSD FRML MDRD: 77 ML/MIN/1.7M2
HCT VFR BLD AUTO: 21.7 % (ref 40–53)
HGB BLD-MCNC: 7.3 G/DL (ref 13.3–17.7)
IMM GRANULOCYTES # BLD: 0 10E9/L (ref 0–0.4)
IMM GRANULOCYTES NFR BLD: 0.8 %
LYMPHOCYTES # BLD AUTO: 0.5 10E9/L (ref 0.8–5.3)
LYMPHOCYTES NFR BLD AUTO: 20.6 %
MCH RBC QN AUTO: 34.4 PG (ref 26.5–33)
MCHC RBC AUTO-ENTMCNC: 33.6 G/DL (ref 31.5–36.5)
MCV RBC AUTO: 102 FL (ref 78–100)
MONOCYTES # BLD AUTO: 0.1 10E9/L (ref 0–1.3)
MONOCYTES NFR BLD AUTO: 2.8 %
NEUTROPHILS # BLD AUTO: 1.9 10E9/L (ref 1.6–8.3)
NEUTROPHILS NFR BLD AUTO: 75.4 %
NRBC # BLD AUTO: 0 10*3/UL
NRBC BLD AUTO-RTO: 0 /100
NUM BPU REQUESTED: 1
PLATELET # BLD AUTO: 41 10E9/L (ref 150–450)
POTASSIUM SERPL-SCNC: 5.6 MMOL/L (ref 3.4–5.3)
RBC # BLD AUTO: 2.12 10E12/L (ref 4.4–5.9)
SPECIMEN EXP DATE BLD: NORMAL
TRANSFUSION STATUS PATIENT QL: NORMAL
TRANSFUSION STATUS PATIENT QL: NORMAL
WBC # BLD AUTO: 2.5 10E9/L (ref 4–11)

## 2017-02-22 PROCEDURE — 25000128 H RX IP 250 OP 636: Performed by: INTERNAL MEDICINE

## 2017-02-22 PROCEDURE — 94640 AIRWAY INHALATION TREATMENT: CPT

## 2017-02-22 PROCEDURE — 96365 THER/PROPH/DIAG IV INF INIT: CPT

## 2017-02-22 PROCEDURE — P9016 RBC LEUKOCYTES REDUCED: HCPCS | Performed by: INTERNAL MEDICINE

## 2017-02-22 PROCEDURE — 86900 BLOOD TYPING SEROLOGIC ABO: CPT | Performed by: INTERNAL MEDICINE

## 2017-02-22 PROCEDURE — 36430 TRANSFUSION BLD/BLD COMPNT: CPT

## 2017-02-22 PROCEDURE — 96401 CHEMO ANTI-NEOPL SQ/IM: CPT

## 2017-02-22 PROCEDURE — 82565 ASSAY OF CREATININE: CPT | Performed by: INTERNAL MEDICINE

## 2017-02-22 PROCEDURE — 82310 ASSAY OF CALCIUM: CPT | Performed by: INTERNAL MEDICINE

## 2017-02-22 PROCEDURE — 25000308 HC RX OP HPI UCR WEL MED 250 IP 250: Performed by: INTERNAL MEDICINE

## 2017-02-22 PROCEDURE — 86850 RBC ANTIBODY SCREEN: CPT | Performed by: INTERNAL MEDICINE

## 2017-02-22 PROCEDURE — 86901 BLOOD TYPING SEROLOGIC RH(D): CPT | Performed by: INTERNAL MEDICINE

## 2017-02-22 PROCEDURE — 85025 COMPLETE CBC W/AUTO DIFF WBC: CPT | Performed by: INTERNAL MEDICINE

## 2017-02-22 PROCEDURE — 86923 COMPATIBILITY TEST ELECTRIC: CPT | Performed by: INTERNAL MEDICINE

## 2017-02-22 PROCEDURE — 84132 ASSAY OF SERUM POTASSIUM: CPT | Performed by: INTERNAL MEDICINE

## 2017-02-22 RX ORDER — SODIUM POLYSTYRENE SULFONATE 15 G/60ML
15 SUSPENSION ORAL; RECTAL ONCE
Qty: 60 ML | Refills: 0 | Status: SHIPPED | OUTPATIENT
Start: 2017-02-22 | End: 2017-03-15

## 2017-02-22 RX ORDER — ALBUTEROL SULFATE 0.83 MG/ML
10 SOLUTION RESPIRATORY (INHALATION) ONCE
Status: COMPLETED | OUTPATIENT
Start: 2017-02-22 | End: 2017-02-22

## 2017-02-22 RX ADMIN — SODIUM CHLORIDE 250 ML: 9 INJECTION, SOLUTION INTRAVENOUS at 12:40

## 2017-02-22 RX ADMIN — ZOLEDRONIC ACID 3.5 MG: 0.8 INJECTION, SOLUTION, CONCENTRATE INTRAVENOUS at 12:40

## 2017-02-22 RX ADMIN — ALBUTEROL SULFATE 10 MG: 2.5 SOLUTION RESPIRATORY (INHALATION) at 12:59

## 2017-02-22 RX ADMIN — BORTEZOMIB 2.1 MG: 3.5 INJECTION, POWDER, LYOPHILIZED, FOR SOLUTION INTRAVENOUS; SUBCUTANEOUS at 13:41

## 2017-02-22 ASSESSMENT — PAIN SCALES - GENERAL: PAINLEVEL: NO PAIN (0)

## 2017-02-22 NOTE — NURSING NOTE
DISCHARGE PLAN:  Next appointments: See patient instruction section  Departure Mode: Ambulatory  Accompanied by: RN to Infusion for Velcade with RN-to-RN report given to   15 minutes for nursing discharge (face to face time)   Bere Pace, KENDALL    1.  Continue Velcade and dexamethasone. Velcade today.  2.  CBC, diff weekly- transfuse to keep hemoglobin of 8 and platelet of 10,000.   3.  RTC MD 4 weeks.   4.  Continue Zometa  5.  Complete 10 days of Cipro.--Pt verbalized understanding.  6.  Please go over the medication list with the patient. --[facility] Maria Eugenia assisted with reviewing & educating Pt regarding his meds.        2/21/2017 Tue  2:30 PM  2:40 P(Co*) 2:30 P 30 UAURO [[id number]] UAURO [[id number]] MOODY HICKMAN [[id number]] UA CYF [[id number]] UMP CYSTOSCOPY [[id number]] Please schedule appt with next available urologist - he is having some bleeding per urethra (schedule the appt as POSSIBLE cysto)      S 2/22/2017 Wed  10:30 A 240 [facility] [[id number]] NORTH CHAIR 4 [[id number]] LEVEL 4 [666] Labs (Recheck potassium), [id number], Zometa & Possible Transfusion      S 3/1/2017 Wed  10:30 A 240 [facility] [[id number]] NORTH CHAIR 4 [[id number]] LEVEL 4 [666] Labs, Velcade & Possible Transfusion       3/8/2017 Wed  9:30 AM  9:30 A 300 [facility] [[id number]] NORTH CHAIR 4 [[id number]] LEVEL 5 [667] Labs, Velcade & Possible Transfusion       3/15/2017 Wed  9:30 AM  9:30 A 300 [facility] [[id number]] NORTH CHAIR 7 [[id number]] LEVEL 5 [667] Labs, Velcade & Possible Transfusion       3/15/2017 Wed  9:45 AM  9:45 A 15 [facility] [[id number]] ALISON JON [[id number]] RETURN [657] Return Multiple Myeloma       3/21/2017 Tue 10:00 AM 10:00 A 10:00 A 20 UAURO [[id number]] UAURO [[id number]] MOODY HICKMAN [[id number]] UA CYF [[id number]] UMP CYSTOSCOPY [[id number]] 1 month Cysto(schedule the appt as POSSIBLE cysto)  3/22/2017 Wed  9:30 AM  9:30 A 300 [facility] [[id number]] [facility] 5 [[id number]] LEVEL 5 [667] Labs, Zometa, Velcade & Possible Transfusion

## 2017-02-22 NOTE — MR AVS SNAPSHOT
After Visit Summary   2/22/2017    Roc Parkinson    MRN: [id number]           Patient Information     Date Of Birth          7/7/1926        Visit Information        Provider Department      2/22/2017 10:30 AM NORTH CHAIR 4 [facility]        Today's Diagnoses     Multiple myeloma not having achieved remission (H)    -  1    Macrocytic anemia        Hypercalcemia        Hyperkalemia           Follow-ups after your visit        Your next 10 appointments already scheduled     Mar 01, 2017 10:30 AM CST   Level 4 with NORTH CHAIR 4   [facility] ([facility])    [facility]  [address]  [place] [address]   [phone]            Mar 06, 2017 10:30 AM CST   Diabetic Education with  DIABETIC ED RESOURCE   [facility] ([facility])    [address] [place] [address]   [phone]            Mar 08, 2017  9:30 AM CST   Level 5 with NORTH CHAIR 4   [facility] ([facility])    [facility]  [address]  [place] [address]   [phone]            Mar 15, 2017  9:30 AM CDT   Level 5 with NORTH CHAIR 7   [facility] ([facility])    [facility]  [address]  [place] [address]   [phone]            Mar 15, 2017  9:45 AM CDT   Return Visit with Gretel Quinn MD   [facility] ([facility])    [facility]  [address]  [place] [address]   [phone]            Mar 20, 2017  9:00 AM CDT   SHORT with Dickson Reich MD   [facility] ([facility])    [address] [place] [address]   [phone]            Mar  "21, 2017 10:00 AM CDT   Cystoscopy with Radha Jackson MD, UA CYF   [facility] [place] ([facility] [place])    [address]  [address]  [place] [address] [phone]            Mar 22, 2017  9:30 AM CDT   Level 5 with NORTH CHAIR 5   [facility] ([facility])    [facility]  [address]  [place] [address] [phone]              Future tests that were ordered for you today     Open Standing Orders        Priority Remaining Interval Expires Ordered    Red blood cell prepare order unit Routine 99/100 CONDITIONAL (SPECIFY) BLOOD  2/22/2017    Transfuse red blood cell unit Routine 99/100 TRANSFUSE 1 UNIT  2/22/2017            Who to contact     If you have questions or need follow up information about today's clinic visit or your schedule please contact [facility] directly at [phone].  Normal or non-critical lab and imaging results will be communicated to you by Centrifuge Systemshart, letter or phone within 4 business days after the clinic has received the results. If you do not hear from us within 7 days, please contact the clinic through Water Science Technologiest or phone. If you have a critical or abnormal lab result, we will notify you by phone as soon as possible.  Submit refill requests through [email or web address] or call your pharmacy and they will forward the refill request to us. Please allow 3 business days for your refill to be completed.          Additional Information About Your Visit        [email or web address] Information     [email or web address] lets you send messages to your doctor, view your test results, renew your prescriptions, schedule appointments and more. To sign up, go to [email or web address] . Click on \"Log in\" on the left side of the screen, which will take you to the Welcome page. Then click on \"Sign up Now\" on the right side of the page.     You will be asked to enter the " access code listed below, as well as some personal information. Please follow the directions to create your username and password.     Your access code is: [email or web address]  Expires: 3/28/2017 12:15 PM     Your access code will  in 90 days. If you need help or a new code, please call your [place] clinic or [phone].        Care EveryWhere ID     This is your Care EveryWhere ID. This could be used by other organizations to access your [place] medical records  [id number]        Your Vitals Were     Pulse Temperature Respirations Pulse Oximetry          80 97.6  F (36.4  C) 14 98%         Blood Pressure from Last 3 Encounters:   17 142/72   17 124/64   17 124/76    Weight from Last 3 Encounters:   17 88 kg (194 lb)   17 88 kg (194 lb)   02/15/17 88.4 kg (194 lb 12.8 oz)              We Performed the Following     ABO/Rh type and screen     Blood component     Calcium     CBC with platelets differential     Creatinine     Obtain affirmation of informed consent     Potassium     Red blood cell prepare order unit     Transfuse red blood cell unit     Treatment Conditions          Today's Medication Changes          These changes are accurate as of: 17  3:43 PM.  If you have any questions, ask your nurse or doctor.               Start taking these medicines.        Dose/Directions    sodium polystyrene 15 GM/60ML suspension   Commonly known as:  KAYEXALATE   Used for:  Hyperkalemia, Multiple myeloma not having achieved remission (H)        Dose:  15 g   Take 60 mLs (15 g) by mouth once for 1 dose   Quantity:  60 mL   Refills:  0            Where to get your medicines      These medications were sent to [place] [facility] - [address]  [address], [place] [address]     Phone:  [phone]     sodium polystyrene 15 GM/60ML suspension                Primary Care Provider Office Phone # Fax #    Dickson Reich [phone]  [phone]       [facility] [address] [address]  [facility] [address]        Thank you!     Thank you for choosing [facility]  for your care. Our goal is always to provide you with excellent care. Hearing back from our patients is one way we can continue to improve our services. Please take a few minutes to complete the written survey that you may receive in the mail after your visit with us. Thank you!             Your Updated Medication List - Protect others around you: Learn how to safely use, store and throw away your medicines at www.disposemymeds.org.          This list is accurate as of: 2/22/17  3:43 PM.  Always use your most recent med list.                   Brand Name Dispense Instructions for use    acyclovir 400 MG tablet    ZOVIRAX    60 tablet    Take 1 tablet (400 mg) by mouth 2 times daily Viral Prophylaxis.       amLODIPine 5 MG tablet    NORVASC    30 tablet    TAKE 1 TABLET BY MOUTH DAILY       ciprofloxacin 250 MG tablet    CIPRO    20 tablet    Take 1 tablet two times daily for 10 days.       clotrimazole-betamethasone cream    LOTRISONE    30 g    Apply topically to the head of the penis two times a day for up to 2 weeks.       dexamethasone 4 MG tablet    DECADRON    40 tablet    Take 10 tablets (40 mg) by mouth every 7 days for 4 doses Take daily on Days 1, 8, 15, and 22. Cycles 3 and 4 ONLY.       ferrous sulfate 325 (65 FE) MG tablet    IRON     Take 325 mg by mouth daily (with breakfast)       FREESTYLE LITE test strip   Generic drug:  blood glucose monitoring     200 each    USE TO TEST BLOOD GLUCOSE TWICE DAILY       gemfibrozil 600 MG tablet    LOPID    180 tablet    TAKE 1 TABLET BY MOUTH TWICE DAILY       glipiZIDE 10 MG tablet    GLUCOTROL    180 tablet    TAKE 1 TABLET BY MOUTH TWICE DAILY BEFORE A MEAL       insulin aspart 100 UNIT/ML injection    NovoLOG PEN    12 mL    Inject 1-5 Units Subcutaneous 4 times daily (with meals and  nightly) Before meal correction: 140-225 - take 1 unit  226-310 - take 2 units 311-395 - take 3 units 396-480 - take 4 units >481 - take 5 units Bedtime correction of: 200-299 give 1 unit 300-399 take 2 units 400 or greater take 3 units       * insulin pen needle 31G X 6 MM     100 each    Use 2 x daily or as directed.       * B-D U/F 31G X 8 MM   Generic drug:  insulin pen needle          levothyroxine 25 MCG tablet    SYNTHROID/LEVOTHROID    90 tablet    TAKE 1 TABLET BY MOUTH EVERY DAY.       lisinopril 30 MG tablet    PRINIVIL,ZESTRIL    90 tablet    TAKE 1 TABLET BY MOUTH EVERY DAY       metoprolol 25 MG tablet    LOPRESSOR    180 tablet    TAKE 1 TABLET BY MOUTH TWICE DAILY       omeprazole 20 MG CR capsule    priLOSEC    90 capsule    TAKE 1 CAPSULE BY MOUTH EVERY DAY       ondansetron 8 MG tablet    ZOFRAN    10 tablet    Take 1 tablet (8 mg) by mouth every 8 hours as needed (Nausea/Vomiting)       polyethylene glycol powder    MIRALAX/GLYCOLAX         prochlorperazine 5 MG tablet    COMPAZINE    30 tablet    Take 1 tablet (5 mg) by mouth every 6 hours as needed (Nausea/Vomiting)       Selenium 200 MCG Tabs      Take 100 mcg by mouth daily. Pt takes one half tab of the 200 mcg daily       sodium polystyrene 15 GM/60ML suspension    KAYEXALATE    60 mL    Take 60 mLs (15 g) by mouth once for 1 dose       tamsulosin 0.4 MG capsule    FLOMAX    90 capsule    TAKE 1 CAPSULE BY MOUTH DAILY       VITAMIN C PO      Take 1,000 mg by mouth daily       VITAMIN D3 PO      Take 1,000 Units by mouth daily       * Notice:  This list has 2 medication(s) that are the same as other medications prescribed for you. Read the directions carefully, and ask your doctor or other care provider to review them with you.

## 2017-02-22 NOTE — PROGRESS NOTES
Infusion Nursing Note:  Roc Parkinson presents today for chemotherapy, Zometa labs and possible blood products  Patient seen by provider today: No   present during visit today: Not Applicable.    Note: HBG 7.3 platelets 41,000 will transfuse 1 unit of packed cells no platelets. OK to give Velcade today with platelet count of 41,000. Potassium =5.6 ALbuterol Neb given and pt to take home Kayexalate to take as soon as   He arrives home for elevated potassium. DR Quinn aware of above lab values and gave appropriate orders.   Intravenous Access:  Labs drawn without difficulty.  Peripheral IV placed.    Treatment Conditions:  Lab Results   Component Value Date    HGB 7.3 02/22/2017     Lab Results   Component Value Date    WBC 2.5 02/22/2017      Lab Results   Component Value Date    ANEU 1.9 02/22/2017     Lab Results   Component Value Date    PLT 41 02/22/2017      Lab Results   Component Value Date     02/15/2017                   Lab Results   Component Value Date    POTASSIUM 5.6 02/22/2017           No results found for: MAG         Lab Results   Component Value Date    CR 0.92 02/22/2017                   Lab Results   Component Value Date    MICHELLE 9.5 02/22/2017                Lab Results   Component Value Date    BILITOTAL 0.5 02/15/2017           Lab Results   Component Value Date    ALBUMIN 2.6 02/15/2017                    Lab Results   Component Value Date    ALT 22 02/15/2017           Lab Results   Component Value Date    AST 18 02/15/2017     Results reviewed, labs MET treatment parameters, ok to proceed with treatment.  Blood transfusion consent signed 12/21/2016.      Post Infusion Assessment:  Patient tolerated infusion without incident.  Patient tolerated injection without incident.  Blood return noted pre and post infusion.  Site patent and intact, free from redness, edema or discomfort.  No evidence of extravasations.  Access discontinued per protocol.    Discharge Plan:    Discharge instructions reviewed with: Patient.  Patient and/or family verbalized understanding of discharge instructions and all questions answered.  Copy of AVS reviewed with patient and/or family.  Patient will return 3/1/2017 for next appointment.  Patient discharged in stable condition accompanied by: self.  Departure Mode: Ambulatory.    Dora Mcgrath RN

## 2017-02-28 ENCOUNTER — DOCUMENTATION ONLY (OUTPATIENT)
Dept: OTHER | Facility: CLINIC | Age: 82
End: 2017-02-28

## 2017-02-28 DIAGNOSIS — Z71.89 ACP (ADVANCE CARE PLANNING): Chronic | ICD-10-CM

## 2017-03-01 ENCOUNTER — HOSPITAL ENCOUNTER (OUTPATIENT)
Facility: CLINIC | Age: 82
Setting detail: SPECIMEN
Discharge: HOME | End: 2017-03-01
Attending: INTERNAL MEDICINE | Admitting: INTERNAL MEDICINE
Payer: MEDICARE

## 2017-03-01 ENCOUNTER — INFUSION THERAPY VISIT (OUTPATIENT)
Dept: INFUSION THERAPY | Facility: CLINIC | Age: 82
End: 2017-03-01
Attending: INTERNAL MEDICINE
Payer: MEDICARE

## 2017-03-01 VITALS
SYSTOLIC BLOOD PRESSURE: 155 MMHG | HEART RATE: 76 BPM | RESPIRATION RATE: 14 BRPM | DIASTOLIC BLOOD PRESSURE: 83 MMHG | HEIGHT: 70 IN | BODY MASS INDEX: 27.46 KG/M2 | TEMPERATURE: 98.1 F | WEIGHT: 191.8 LBS | OXYGEN SATURATION: 98 %

## 2017-03-01 DIAGNOSIS — D53.9 MACROCYTIC ANEMIA: ICD-10-CM

## 2017-03-01 DIAGNOSIS — C90.00 MULTIPLE MYELOMA NOT HAVING ACHIEVED REMISSION (H): Primary | ICD-10-CM

## 2017-03-01 LAB
ABO + RH BLD: NORMAL
ABO + RH BLD: NORMAL
ALBUMIN SERPL-MCNC: 2.7 G/DL (ref 3.4–5)
ALP SERPL-CCNC: 84 U/L (ref 40–150)
ALT SERPL W P-5'-P-CCNC: 20 U/L (ref 0–70)
ANION GAP SERPL CALCULATED.3IONS-SCNC: 9 MMOL/L (ref 3–14)
AST SERPL W P-5'-P-CCNC: 14 U/L (ref 0–45)
BASOPHILS # BLD AUTO: 0 10E9/L (ref 0–0.2)
BASOPHILS NFR BLD AUTO: 0 %
BILIRUB SERPL-MCNC: 0.4 MG/DL (ref 0.2–1.3)
BLD GP AB SCN SERPL QL: NORMAL
BLD PROD TYP BPU: NORMAL
BLD UNIT ID BPU: 0
BLD UNIT ID BPU: 0
BLOOD BANK CMNT PATIENT-IMP: NORMAL
BLOOD PRODUCT CODE: NORMAL
BLOOD PRODUCT CODE: NORMAL
BPU ID: NORMAL
BPU ID: NORMAL
BUN SERPL-MCNC: 34 MG/DL (ref 7–30)
CALCIUM SERPL-MCNC: 9 MG/DL (ref 8.5–10.1)
CHLORIDE SERPL-SCNC: 107 MMOL/L (ref 94–109)
CO2 SERPL-SCNC: 21 MMOL/L (ref 20–32)
CREAT SERPL-MCNC: 1.05 MG/DL (ref 0.66–1.25)
DIFFERENTIAL METHOD BLD: ABNORMAL
EOSINOPHIL # BLD AUTO: 0 10E9/L (ref 0–0.7)
EOSINOPHIL NFR BLD AUTO: 0.8 %
ERYTHROCYTE [DISTWIDTH] IN BLOOD BY AUTOMATED COUNT: 25.7 % (ref 10–15)
GFR SERPL CREATININE-BSD FRML MDRD: 66 ML/MIN/1.7M2
GLUCOSE SERPL-MCNC: 288 MG/DL (ref 70–99)
HCT VFR BLD AUTO: 21 % (ref 40–53)
HGB BLD-MCNC: 7.1 G/DL (ref 13.3–17.7)
IMM GRANULOCYTES # BLD: 0 10E9/L (ref 0–0.4)
IMM GRANULOCYTES NFR BLD: 0.8 %
LYMPHOCYTES # BLD AUTO: 0.5 10E9/L (ref 0.8–5.3)
LYMPHOCYTES NFR BLD AUTO: 18.1 %
MCH RBC QN AUTO: 34.3 PG (ref 26.5–33)
MCHC RBC AUTO-ENTMCNC: 33.8 G/DL (ref 31.5–36.5)
MCV RBC AUTO: 101 FL (ref 78–100)
MONOCYTES # BLD AUTO: 0.1 10E9/L (ref 0–1.3)
MONOCYTES NFR BLD AUTO: 4.5 %
NEUTROPHILS # BLD AUTO: 2 10E9/L (ref 1.6–8.3)
NEUTROPHILS NFR BLD AUTO: 75.8 %
NRBC # BLD AUTO: 0 10*3/UL
NRBC BLD AUTO-RTO: 0 /100
NUM BPU REQUESTED: 1
NUM BPU REQUESTED: 1
PLATELET # BLD AUTO: 39 10E9/L (ref 150–450)
POTASSIUM SERPL-SCNC: 4.9 MMOL/L (ref 3.4–5.3)
PROT SERPL-MCNC: 9.7 G/DL (ref 6.8–8.8)
RBC # BLD AUTO: 2.07 10E12/L (ref 4.4–5.9)
SODIUM SERPL-SCNC: 137 MMOL/L (ref 133–144)
SPECIMEN EXP DATE BLD: NORMAL
TRANSFUSION STATUS PATIENT QL: NORMAL
WBC # BLD AUTO: 2.6 10E9/L (ref 4–11)

## 2017-03-01 PROCEDURE — P9037 PLATE PHERES LEUKOREDU IRRAD: HCPCS | Performed by: INTERNAL MEDICINE

## 2017-03-01 PROCEDURE — P9016 RBC LEUKOCYTES REDUCED: HCPCS | Performed by: INTERNAL MEDICINE

## 2017-03-01 PROCEDURE — 86901 BLOOD TYPING SEROLOGIC RH(D): CPT | Performed by: INTERNAL MEDICINE

## 2017-03-01 PROCEDURE — 86900 BLOOD TYPING SEROLOGIC ABO: CPT | Performed by: INTERNAL MEDICINE

## 2017-03-01 PROCEDURE — 36430 TRANSFUSION BLD/BLD COMPNT: CPT

## 2017-03-01 PROCEDURE — P9016 RBC LEUKOCYTES REDUCED: HCPCS | Mod: 59 | Performed by: INTERNAL MEDICINE

## 2017-03-01 PROCEDURE — 80053 COMPREHEN METABOLIC PANEL: CPT | Performed by: INTERNAL MEDICINE

## 2017-03-01 PROCEDURE — 86850 RBC ANTIBODY SCREEN: CPT | Performed by: INTERNAL MEDICINE

## 2017-03-01 PROCEDURE — 25000128 H RX IP 250 OP 636: Mod: JW | Performed by: INTERNAL MEDICINE

## 2017-03-01 PROCEDURE — 86923 COMPATIBILITY TEST ELECTRIC: CPT | Performed by: INTERNAL MEDICINE

## 2017-03-01 PROCEDURE — 85025 COMPLETE CBC W/AUTO DIFF WBC: CPT | Performed by: INTERNAL MEDICINE

## 2017-03-01 PROCEDURE — 96401 CHEMO ANTI-NEOPL SQ/IM: CPT

## 2017-03-01 RX ADMIN — BORTEZOMIB 2.1 MG: 3.5 INJECTION, POWDER, LYOPHILIZED, FOR SOLUTION INTRAVENOUS; SUBCUTANEOUS at 14:12

## 2017-03-01 ASSESSMENT — PAIN SCALES - GENERAL: PAINLEVEL: NO PAIN (0)

## 2017-03-01 NOTE — MR AVS SNAPSHOT
After Visit Summary   3/1/2017    Roc Parkinson    MRN: [id number]           Patient Information     Date Of Birth          7/7/1926        Visit Information        Provider Department      3/1/2017 10:30 AM  INFUSION CHAIR 4 [facility]        Today's Diagnoses     Multiple myeloma not having achieved remission (H)    -  1    Macrocytic anemia           Follow-ups after your visit        Follow-up notes from your care team     Return in about 7 days (around 3/8/2017).      Your next 10 appointments already scheduled     Mar 06, 2017 10:30 AM CST   Diabetic Education with  DIABETIC ED RESOURCE   [facility] ([facility])    [address] [id number] [phone]            Mar 08, 2017  9:30 AM CST   Level 5 with  INFUSION CHAIR 4   [facility] ([facility])    [facility]  [address]  [facility] [address]   [phone]            Mar 15, 2017  9:30 AM CDT   Level 5 with  INFUSION CHAIR 7   [facility] ([facility])    [facility]  [address]  [facility] [address]   [phone]            Mar 15, 2017  9:45 AM CDT   Return Visit with Gretel Quinn MD   [facility] ([facility])    [facility]  [address]  [facility] [address]   [phone]            Mar 20, 2017  9:00 AM CDT   SHORT with Dickson Reich MD   [facility] ([facility])    [address] [place] [address] [address]  [facility] [address]   [phone]            Mar 21, 2017 10:00 AM CDT   Cystoscopy with Radha Jackson MD, [facility] [place] ([facility] [place])    [address]  [place] [address]   [phone]            Mar 22, 2017  9:30 AM CDT   Level 5 with  INFUSION CHAIR 5   [facility] ([facility])    [facility]  [address]  [place] [address]   [phone]              Future tests that were ordered for you today     Open Standing Orders        Priority Remaining Interval Expires Ordered    Red blood cell prepare order unit Routine 99/100 CONDITIONAL (SPECIFY) BLOOD  3/1/2017    Transfuse red blood cell unit Routine 99/100 TRANSFUSE 1 UNIT  3/1/2017    Platelets prepare order unit Routine 99/100 CONDITIONAL (SPECIFY) BLOOD  3/1/2017    Transfuse platelets unit Routine 99/100 TRANSFUSE 1 DOSE  3/1/2017            Who to contact     If you have questions or need follow up information about today's clinic visit or your schedule please contact [facility] directly at [phone].  Normal or non-critical lab and imaging results will be communicated to you by MyActivityPalhart, letter or phone within 4 business days after the clinic has received the results. If you do not hear from us within 7 days, please contact the clinic through [employer] or phone. If you have a critical or abnormal lab result, we will notify you by phone as soon as possible.  Submit refill requests through [employer] or call your pharmacy and they will forward the refill request to us. Please allow 3 business days for your refill to be completed.          Additional Information About Your Visit        [employer] Information     [employer] lets you send messages to your doctor, view your test results, renew your prescriptions, schedule appointments and more. To sign up, go to [email or web address] . Click on \"Log in\" on the left side of the screen, which will take you to the Welcome page. Then click on \"Sign up Now\" on the right side of the page.     You will be asked to enter the access code listed " "below, as well as some personal information. Please follow the directions to create your username and password.     Your access code is: [email or web address]  Expires: 3/28/2017 12:15 PM     Your access code will  in 90 days. If you need help or a new code, please call your [facility] or [phone].        Care EveryWhere ID     This is your Care EveryWhere ID. This could be used by other organizations to access your [place] medical records  [id number]        Your Vitals Were     Pulse Temperature Respirations Height Pulse Oximetry BMI (Body Mass Index)    76 98.1  F (36.7  C) (Oral) 14 1.778 m (5' 10\") 98% 27.52 kg/m2       Blood Pressure from Last 3 Encounters:   17 155/83   17 142/72   17 124/64    Weight from Last 3 Encounters:   17 87 kg (191 lb 12.8 oz)   17 88 kg (194 lb)   17 88 kg (194 lb)              We Performed the Following     ABO/Rh type and screen     Blood component     Blood component     CBC with platelets differential     Comprehensive metabolic panel     Obtain affirmation of informed consent     Platelets prepare order unit     Red blood cell prepare order unit     Transfuse platelets unit     Transfuse red blood cell unit     Treatment Conditions        Primary Care Provider Office Phone # Fax #    Dickson Reich [phone]       [facility] [address] [facility] [address]        Thank you!     Thank you for choosing [facility]  for your care. Our goal is always to provide you with excellent care. Hearing back from our patients is one way we can continue to improve our services. Please take a few minutes to complete the written survey that you may receive in the mail after your visit with us. Thank you!             Your Updated Medication List - Protect others around you: Learn how to safely use, store and throw away your medicines at www.disposemymeds.org.          This " list is accurate as of: 3/1/17  3:08 PM.  Always use your most recent med list.                   Brand Name Dispense Instructions for use    acyclovir 400 MG tablet    ZOVIRAX    60 tablet    Take 1 tablet (400 mg) by mouth 2 times daily Viral Prophylaxis.       amLODIPine 5 MG tablet    NORVASC    30 tablet    TAKE 1 TABLET BY MOUTH DAILY       ciprofloxacin 250 MG tablet    CIPRO    20 tablet    Take 1 tablet two times daily for 10 days.       clotrimazole-betamethasone cream    LOTRISONE    30 g    Apply topically to the head of the penis two times a day for up to 2 weeks.       dexamethasone 4 MG tablet    DECADRON    40 tablet    Take 10 tablets (40 mg) by mouth every 7 days for 4 doses Take daily on Days 1, 8, 15, and 22. Cycles 3 and 4 ONLY.       ferrous sulfate 325 (65 FE) MG tablet    IRON     Take 325 mg by mouth daily (with breakfast)       FREESTYLE LITE test strip   Generic drug:  blood glucose monitoring     200 each    USE TO TEST BLOOD GLUCOSE TWICE DAILY       gemfibrozil 600 MG tablet    LOPID    180 tablet    TAKE 1 TABLET BY MOUTH TWICE DAILY       glipiZIDE 10 MG tablet    GLUCOTROL    180 tablet    TAKE 1 TABLET BY MOUTH TWICE DAILY BEFORE A MEAL       insulin aspart 100 UNIT/ML injection    NovoLOG PEN    12 mL    Inject 1-5 Units Subcutaneous 4 times daily (with meals and nightly) Before meal correction: 140-225 - take 1 unit  226-310 - take 2 units 311-395 - take 3 units 396-480 - take 4 units >481 - take 5 units Bedtime correction of: 200-299 give 1 unit 300-399 take 2 units 400 or greater take 3 units       * insulin pen needle 31G X 6 MM     100 each    Use 2 x daily or as directed.       * B-D U/F 31G X 8 MM   Generic drug:  insulin pen needle          levothyroxine 25 MCG tablet    SYNTHROID/LEVOTHROID    90 tablet    TAKE 1 TABLET BY MOUTH EVERY DAY.       lisinopril 30 MG tablet    PRINIVIL,ZESTRIL    90 tablet    TAKE 1 TABLET BY MOUTH EVERY DAY       metoprolol 25 MG tablet     LOPRESSOR    180 tablet    TAKE 1 TABLET BY MOUTH TWICE DAILY       omeprazole 20 MG CR capsule    priLOSEC    90 capsule    TAKE 1 CAPSULE BY MOUTH EVERY DAY       ondansetron 8 MG tablet    ZOFRAN    10 tablet    Take 1 tablet (8 mg) by mouth every 8 hours as needed (Nausea/Vomiting)       polyethylene glycol powder    MIRALAX/GLYCOLAX         prochlorperazine 5 MG tablet    COMPAZINE    30 tablet    Take 1 tablet (5 mg) by mouth every 6 hours as needed (Nausea/Vomiting)       Selenium 200 MCG Tabs      Take 100 mcg by mouth daily. Pt takes one half tab of the 200 mcg daily       tamsulosin 0.4 MG capsule    FLOMAX    90 capsule    TAKE 1 CAPSULE BY MOUTH DAILY       VITAMIN C PO      Take 1,000 mg by mouth daily       VITAMIN D3 PO      Take 1,000 Units by mouth daily       * Notice:  This list has 2 medication(s) that are the same as other medications prescribed for you. Read the directions carefully, and ask your doctor or other care provider to review them with you.

## 2017-03-01 NOTE — PROGRESS NOTES
Infusion Nursing Note:  Roc Parkinson presents today for labs Velcade and possible transfusion.    Patient seen by provider today: No   present during visit today: Not Applicable.    Note: Has been bleeding from Left  nare since Saturday.   No bleeding from Left nare after platelets were given.  Intravenous Access:  Labs drawn without difficulty.  Peripheral IV placed.    Treatment Conditions:  Lab Results   Component Value Date    HGB 7.1 03/01/2017     Lab Results   Component Value Date    WBC 2.6 03/01/2017      Lab Results   Component Value Date    ANEU 2.0 03/01/2017     Lab Results   Component Value Date    PLT 39 03/01/2017      Lab Results   Component Value Date     03/01/2017                   Lab Results   Component Value Date    POTASSIUM 4.9 03/01/2017           No results found for: MAG         Lab Results   Component Value Date    CR 1.05 03/01/2017                   Lab Results   Component Value Date    MICHELLE 9.0 03/01/2017                Lab Results   Component Value Date    BILITOTAL 0.4 03/01/2017           Lab Results   Component Value Date    ALBUMIN 2.7 03/01/2017                    Lab Results   Component Value Date    ALT 20 03/01/2017           Lab Results   Component Value Date    AST 14 03/01/2017     Results reviewed, labs did NOT meet treatment parameters: OK for Velcade per Dr Quinn with Platelets at 39,000 .  Blood transfusion consent signed 12/21/2016      Post Infusion Assessment:  Patient tolerated infusion without incident.  Blood return noted pre and post infusion.  Site patent and intact, free from redness, edema or discomfort.  No evidence of extravasations.  Access discontinued per protocol.    Discharge Plan:   Discharge instructions reviewed with: Patient.  Patient and/or family verbalized understanding of discharge instructions and all questions answered.  Copy of AVS reviewed with patient and/or family.  Patient will return 3/8/2017 for next  appointment.  Patient discharged in stable condition accompanied by: self.  Departure Mode: Ambulatory.    Dora Mcgrath RN

## 2017-03-06 ENCOUNTER — ALLIED HEALTH/NURSE VISIT (OUTPATIENT)
Dept: EDUCATION SERVICES | Facility: CLINIC | Age: 82
End: 2017-03-06
Payer: COMMERCIAL

## 2017-03-06 DIAGNOSIS — N18.30 TYPE 2 DIABETES MELLITUS WITH STAGE 3 CHRONIC KIDNEY DISEASE, WITH LONG-TERM CURRENT USE OF INSULIN (H): Primary | ICD-10-CM

## 2017-03-06 DIAGNOSIS — E11.22 TYPE 2 DIABETES MELLITUS WITH STAGE 3 CHRONIC KIDNEY DISEASE, WITH LONG-TERM CURRENT USE OF INSULIN (H): Primary | ICD-10-CM

## 2017-03-06 DIAGNOSIS — Z79.4 TYPE 2 DIABETES MELLITUS WITH STAGE 3 CHRONIC KIDNEY DISEASE, WITH LONG-TERM CURRENT USE OF INSULIN (H): Primary | ICD-10-CM

## 2017-03-06 PROCEDURE — G0108 DIAB MANAGE TRN  PER INDIV: HCPCS

## 2017-03-06 NOTE — MR AVS SNAPSHOT
After Visit Summary   3/6/2017    Roc Parkinson    MRN: [id number]           Patient Information     Date Of Birth          7/7/1926        Visit Information        Provider Department      3/6/2017 10:30 AM  DIABETIC ED RESOURCE [facility]        Today's Diagnoses     Type 2 diabetes mellitus with stage 3 chronic kidney disease, with long-term current use of insulin (H)    -  1      Care Instructions    Before breakfast, lunch, and dinner - If blood sugar is:  140-225 - take 1 unit   226-310 - take 2 units  311-395 - take 3 units  396-480 - take 4 units  >481 - take 5 units    Continue bedtime correction of:  200-299 give 1 unit  300-399 take 2 units  400 or greater take 3 units    Have papaya juice with dinner meal instead of before bed.     If having raisin bran in the morning, do not have juice.         Follow-ups after your visit        Your next 10 appointments already scheduled     Mar 08, 2017  9:30 AM CST   Level 5 with  INFUSION CHAIR 4   [facility] ([facility])    [facility] [place] [address]  [facility] [address]   [phone]            Mar 15, 2017  9:30 AM CDT   Level 5 with  INFUSION CHAIR 7   [facility] ([facility])    [facility]  [address]  [place] [address]   [phone]            Mar 15, 2017  9:45 AM CDT   Return Visit with Gretel Quinn MD   [facility])    [facility]  [address]  [place] [address]   [phone]            Mar 20, 2017  9:00 AM CDT   SHORT with Dickson Reich MD   [facility] ([facility])    [address] [place] [address]   [phone]            Mar 21, 2017 10:00 AM CDT   Cystoscopy with  "Radha Jackson MD,  CYF   [facility] [place] ([facility] [place])    [address]  [address]  [place] [address] [phone]            Mar 22, 2017  9:30 AM CDT   Level 5 with  INFUSION CHAIR 5   [facility] ([facility])    [facility] [place]  [address]  [facility] [address] [phone]              Who to contact     If you have questions or need follow up information about today's clinic visit or your schedule please contact [facility] directly at [phone].  Normal or non-critical lab and imaging results will be communicated to you by MyChart, letter or phone within 4 business days after the clinic has received the results. If you do not hear from us within 7 days, please contact the clinic through MyChart or phone. If you have a critical or abnormal lab result, we will notify you by phone as soon as possible.  Submit refill requests through [employer] or call your pharmacy and they will forward the refill request to us. Please allow 3 business days for your refill to be completed.          Additional Information About Your Visit        [employer] Information     [employer] lets you send messages to your doctor, view your test results, renew your prescriptions, schedule appointments and more. To sign up, go to [email or web address] . Click on \"Log in\" on the left side of the screen, which will take you to the Welcome page. Then click on \"Sign up Now\" on the right side of the page.     You will be asked to enter the access code listed below, as well as some personal information. Please follow the directions to create your username and password.     Your access code is: [email or web address]  Expires: 3/28/2017 12:15 PM     Your access code will  in 90 days. If you need help or a new code, please call your [facility] or [phone].        Care " EveryWhere ID     This is your Care EveryWhere ID. This could be used by other organizations to access your [place] medical records  [id number]         Blood Pressure from Last 3 Encounters:   03/01/17 155/83   02/22/17 142/72   02/21/17 124/64    Weight from Last 3 Encounters:   03/01/17 87 kg (191 lb 12.8 oz)   02/21/17 88 kg (194 lb)   02/20/17 88 kg (194 lb)              Today, you had the following     No orders found for display       Primary Care Provider Office Phone # Fax #    Dickson Reich [phone]       [facility] [address]  [place] [address]        Thank you!     Thank you for choosing [facility]  for your care. Our goal is always to provide you with excellent care. Hearing back from our patients is one way we can continue to improve our services. Please take a few minutes to complete the written survey that you may receive in the mail after your visit with us. Thank you!             Your Updated Medication List - Protect others around you: Learn how to safely use, store and throw away your medicines at www.disposemymeds.org.          This list is accurate as of: 3/6/17 10:54 AM.  Always use your most recent med list.                   Brand Name Dispense Instructions for use    acyclovir 400 MG tablet    ZOVIRAX    60 tablet    Take 1 tablet (400 mg) by mouth 2 times daily Viral Prophylaxis.       amLODIPine 5 MG tablet    NORVASC    30 tablet    TAKE 1 TABLET BY MOUTH DAILY       ciprofloxacin 250 MG tablet    CIPRO    20 tablet    Take 1 tablet two times daily for 10 days.       clotrimazole-betamethasone cream    LOTRISONE    30 g    Apply topically to the head of the penis two times a day for up to 2 weeks.       dexamethasone 4 MG tablet    DECADRON    40 tablet    Take 10 tablets (40 mg) by mouth every 7 days for 4 doses Take daily on Days 1, 8, 15, and 22. Cycles 3 and 4 ONLY.       ferrous sulfate 325 (65 FE) MG  tablet    IRON     Take 325 mg by mouth daily (with breakfast)       FREESTYLE LITE test strip   Generic drug:  blood glucose monitoring     200 each    USE TO TEST BLOOD GLUCOSE TWICE DAILY       gemfibrozil 600 MG tablet    LOPID    180 tablet    TAKE 1 TABLET BY MOUTH TWICE DAILY       glipiZIDE 10 MG tablet    GLUCOTROL    180 tablet    TAKE 1 TABLET BY MOUTH TWICE DAILY BEFORE A MEAL       insulin aspart 100 UNIT/ML injection    NovoLOG PEN    12 mL    Inject 1-5 Units Subcutaneous 4 times daily (with meals and nightly) Before meal correction: 140-225 - take 1 unit  226-310 - take 2 units 311-395 - take 3 units 396-480 - take 4 units >481 - take 5 units Bedtime correction of: 200-299 give 1 unit 300-399 take 2 units 400 or greater take 3 units       * insulin pen needle 31G X 6 MM     100 each    Use 2 x daily or as directed.       * B-D U/F 31G X 8 MM   Generic drug:  insulin pen needle          levothyroxine 25 MCG tablet    SYNTHROID/LEVOTHROID    90 tablet    TAKE 1 TABLET BY MOUTH EVERY DAY.       lisinopril 30 MG tablet    PRINIVIL,ZESTRIL    90 tablet    TAKE 1 TABLET BY MOUTH EVERY DAY       metoprolol 25 MG tablet    LOPRESSOR    180 tablet    TAKE 1 TABLET BY MOUTH TWICE DAILY       omeprazole 20 MG CR capsule    priLOSEC    90 capsule    TAKE 1 CAPSULE BY MOUTH EVERY DAY       ondansetron 8 MG tablet    ZOFRAN    10 tablet    Take 1 tablet (8 mg) by mouth every 8 hours as needed (Nausea/Vomiting)       polyethylene glycol powder    MIRALAX/GLYCOLAX         prochlorperazine 5 MG tablet    COMPAZINE    30 tablet    Take 1 tablet (5 mg) by mouth every 6 hours as needed (Nausea/Vomiting)       Selenium 200 MCG Tabs      Take 100 mcg by mouth daily. Pt takes one half tab of the 200 mcg daily       tamsulosin 0.4 MG capsule    FLOMAX    90 capsule    TAKE 1 CAPSULE BY MOUTH DAILY       VITAMIN C PO      Take 1,000 mg by mouth daily       VITAMIN D3 PO      Take 1,000 Units by mouth daily       * Notice:   This list has 2 medication(s) that are the same as other medications prescribed for you. Read the directions carefully, and ask your doctor or other care provider to review them with you.

## 2017-03-06 NOTE — PROGRESS NOTES
Diabetes Self Management Training: Follow-up Visit    Roc Parkinson presents today for education and evaluation of glucose control related to Type 2 diabetes.    He is accompanied by self    Patient's diabetes management related comments/concerns: wants to see his insulin scale tightened.    Patient would like this visit to be focused around the following diabetes-related behaviors and goals: Healthy Eating and Taking Medication    ASSESSMENT:  Patient Problem List reviewed for relevant medical history and current medical status.    Current Diabetes Management per Patient:  Taking diabetes medications?   yes:     Diabetes Medication(s)     Insulin Sig    insulin aspart (NOVOLOG PEN) 100 UNIT/ML injection Inject 1-5 Units Subcutaneous 4 times daily (with meals and nightly) Before meal correction:   140-225 - take 1 unit    226-310 - take 2 units   311-395 - take 3 units   396-480 - take 4 units   >481 - take 5 units  Bedtime correction of:   200-299 give 1 unit   300-399 take 2 units   400 or greater take 3 units    Sulfonylureas Sig    glipiZIDE (GLUCOTROL) 10 MG tablet TAKE 1 TABLET BY MOUTH TWICE DAILY BEFORE A MEAL      **Has not started new correction scale yet.     Patient glucose self monitoring as follows: 4-5 times daily.     BG results:   Date Breakfast  Lunch  Dinner  Bedtime    Before After Before After Before After    3/6 124         3/5 128  141  173  209   3/4 158  390  166     3/3 119  260  386     3/2 344  208  288  224   3/1 164      244   2/28 123      284       BG values are: Not in goal before lunch, dinner, and bedtime.  Overall, his blood sugars are a higher than last visit.     Patient's most recent   Lab Results   Component Value Date    A1C 8.0 11/14/2016    is not meeting goal of <8.0    Nutrition:  Patient eats 3 meals per day. Has started drinking papaya juice lately.     Breakfast - cereal (raisin bran or cheerios) or eggs and papaya juice (1 cup)  Lunch - soup with bread and  "buttermilk or milk or papaya juice (1 cup)  Dinner - meat and vegetables and milk    Snacks - not really, no snacks before bed    Beverages: papaya juice     Cultural/Evangelical diet restrictions: No     Biggest Challenge to Healthy Eating: knowing what to eat    Physical Activity:    Not assessed    Diabetes Complications:  Not discussed today.    Vitals:  There were no vitals taken for this visit.  Estimated body mass index is 27.52 kg/(m^2) as calculated from the following:    Height as of 3/1/17: 1.778 m (5' 10\").    Weight as of 3/1/17: 87 kg (191 lb 12.8 oz).   Last 3 BP:   BP Readings from Last 3 Encounters:   03/01/17 155/83   02/22/17 142/72   02/21/17 124/64       History   Smoking Status     Never Smoker   Smokeless Tobacco     Never Used       Labs:  Lab Results   Component Value Date    A1C 8.0 11/14/2016     Lab Results   Component Value Date     03/01/2017     Lab Results   Component Value Date    LDL 61 05/09/2016    LDL 96.8 08/27/2012     HDL Cholesterol   Date Value Ref Range Status   05/09/2016 25 (L) >39 mg/dL Final   ]  GFR Estimate   Date Value Ref Range Status   03/01/2017 66 >60 mL/min/1.7m2 Final     Comment:     Non  GFR Calc     GFR Estimate If Black   Date Value Ref Range Status   03/01/2017 80 >60 mL/min/1.7m2 Final     Comment:      GFR Calc     Lab Results   Component Value Date    CR 1.05 03/01/2017     No results found for: MICROALBUMIN    Health Beliefs and Attitudes:   Patient Activation Measure Survey Score:  FANNY Score (Last Two) 10/14/2015 9/28/2016   FANNY Raw Score 40 29   Activation Score 60 52.9   FANNY Level 3 2       Stage of Change: ACTION (Actively working towards change)    Diabetes knowledge and skills assessment:     Patient is knowledgeable in diabetes management concepts related to: Monitoring, Taking Medication and Problem Solving    Patient needs further education on the following diabetes management concepts: Healthy Eating, " Taking Medication, Reducing Risks and Healthy Coping    Barriers to Learning Assessment: No Barriers identified    Based on learning assessment above, most appropriate setting for further diabetes education would be: Individual setting.    INTERVENTION:  Pt with higher blood sugars the past couple weeks but has not started the new correction scale and has been missing some doses for his insulin. Also, has started drinking juice before bed. Will have patient start new correction scale as outlined in AVS and med list and reiterated checking BG 4 x/day as well as educated on some diet changes that can be made to help his numbers.     Education provided today on:  AADE Self-Care Behaviors:  Healthy Eating: carbohydrate counting and consistency in amount, composition, and timing of food intake. He has been drinking juice before bed which is new for him. Encouraged him to have his juice with dinner if he wants it but not before bed as this is likely contributing to higher fasting numbers in the mornings. Reiterated consistency with carb intake at meals.     Monitoring: Emphasized importance of checking BG before each meal and bedtime as there were a few days he missed some insulin doses before meals and bedtime. Pointed out his better BG control when checking 4 x/day.     Taking Medication: Educated on new correction scale for insulin before meals. Instructed him to keep the dose before bed the same.     Opportunities for ongoing education and support in diabetes-self management were discussed.    Pt verbalized understanding of concepts discussed and recommendations provided today.       Education Materials Provided:  Carbohydrate Counting    PLAN:  See Patient Instructions for co-developed, patient-stated behavior change goals.  Pt to begin new correction scale for insulin before meals. Continue same dose before bed.   Pt to check blood sugars 4 x/day (before meals and before bed).   AVS printed and provided to patient  today.    FOLLOW-UP:  Follow-up appointment scheduled on April 3, 2017.  Education topics to cover at the next diabetes education visit(s): review blood sugars and diet    Ongoing plan for education and support: Written resources (magazines, books, etc.), Follow-up visit with diabetes educator in 1 month (per pt preference) and Follow-up with primary care provider    Kateryna Quiñonez) YAZMIN Osorio CDE    Time Spent: 30 minutes  Encounter Type: Individual    Any diabetes medication dose changes were made via the CDE Protocol and Collaborative Practice Agreement with the patient's referring provider. A copy of this encounter was shared with the provider.

## 2017-03-06 NOTE — PATIENT INSTRUCTIONS
Before breakfast, lunch, and dinner - If blood sugar is:  140-225 - take 1 unit   226-310 - take 2 units  311-395 - take 3 units  396-480 - take 4 units  >481 - take 5 units    Continue bedtime correction of:  200-299 give 1 unit  300-399 take 2 units  400 or greater take 3 units    Have papaya juice with dinner meal instead of before bed.     If having raisin bran in the morning, do not have juice.

## 2017-03-08 ENCOUNTER — HOSPITAL ENCOUNTER (OUTPATIENT)
Facility: CLINIC | Age: 82
Setting detail: SPECIMEN
Discharge: HOME | End: 2017-03-08
Attending: INTERNAL MEDICINE | Admitting: INTERNAL MEDICINE
Payer: MEDICARE

## 2017-03-08 ENCOUNTER — INFUSION THERAPY VISIT (OUTPATIENT)
Dept: INFUSION THERAPY | Facility: CLINIC | Age: 82
End: 2017-03-08
Attending: INTERNAL MEDICINE
Payer: MEDICARE

## 2017-03-08 VITALS
SYSTOLIC BLOOD PRESSURE: 139 MMHG | TEMPERATURE: 97.8 F | HEART RATE: 67 BPM | BODY MASS INDEX: 27.63 KG/M2 | DIASTOLIC BLOOD PRESSURE: 70 MMHG | RESPIRATION RATE: 16 BRPM | OXYGEN SATURATION: 99 % | HEIGHT: 70 IN | WEIGHT: 193 LBS

## 2017-03-08 DIAGNOSIS — C90.00 MULTIPLE MYELOMA NOT HAVING ACHIEVED REMISSION (H): Primary | ICD-10-CM

## 2017-03-08 LAB
BASOPHILS # BLD AUTO: 0 10E9/L (ref 0–0.2)
BASOPHILS NFR BLD AUTO: 0.3 %
DIFFERENTIAL METHOD BLD: ABNORMAL
EOSINOPHIL # BLD AUTO: 0 10E9/L (ref 0–0.7)
EOSINOPHIL NFR BLD AUTO: 0.9 %
ERYTHROCYTE [DISTWIDTH] IN BLOOD BY AUTOMATED COUNT: 25.3 % (ref 10–15)
HCT VFR BLD AUTO: 25.2 % (ref 40–53)
HGB BLD-MCNC: 8.5 G/DL (ref 13.3–17.7)
IMM GRANULOCYTES # BLD: 0 10E9/L (ref 0–0.4)
IMM GRANULOCYTES NFR BLD: 0.9 %
LYMPHOCYTES # BLD AUTO: 0.6 10E9/L (ref 0.8–5.3)
LYMPHOCYTES NFR BLD AUTO: 19 %
MCH RBC QN AUTO: 34.1 PG (ref 26.5–33)
MCHC RBC AUTO-ENTMCNC: 33.7 G/DL (ref 31.5–36.5)
MCV RBC AUTO: 101 FL (ref 78–100)
MONOCYTES # BLD AUTO: 0.1 10E9/L (ref 0–1.3)
MONOCYTES NFR BLD AUTO: 1.5 %
NEUTROPHILS # BLD AUTO: 2.6 10E9/L (ref 1.6–8.3)
NEUTROPHILS NFR BLD AUTO: 77.4 %
NRBC # BLD AUTO: 0 10*3/UL
NRBC BLD AUTO-RTO: 0 /100
PLATELET # BLD AUTO: 40 10E9/L (ref 150–450)
RBC # BLD AUTO: 2.49 10E12/L (ref 4.4–5.9)
WBC # BLD AUTO: 3.3 10E9/L (ref 4–11)

## 2017-03-08 PROCEDURE — 85025 COMPLETE CBC W/AUTO DIFF WBC: CPT | Performed by: INTERNAL MEDICINE

## 2017-03-08 PROCEDURE — 25000128 H RX IP 250 OP 636: Performed by: INTERNAL MEDICINE

## 2017-03-08 PROCEDURE — 36415 COLL VENOUS BLD VENIPUNCTURE: CPT

## 2017-03-08 PROCEDURE — 96401 CHEMO ANTI-NEOPL SQ/IM: CPT

## 2017-03-08 RX ORDER — METHYLPREDNISOLONE SODIUM SUCCINATE 125 MG/2ML
125 INJECTION, POWDER, LYOPHILIZED, FOR SOLUTION INTRAMUSCULAR; INTRAVENOUS
Status: CANCELLED
Start: 2017-03-29

## 2017-03-08 RX ORDER — ALBUTEROL SULFATE 90 UG/1
1-2 AEROSOL, METERED RESPIRATORY (INHALATION)
Status: CANCELLED
Start: 2017-04-05

## 2017-03-08 RX ORDER — ALBUTEROL SULFATE 90 UG/1
1-2 AEROSOL, METERED RESPIRATORY (INHALATION)
Status: CANCELLED
Start: 2017-03-29

## 2017-03-08 RX ORDER — DIPHENHYDRAMINE HYDROCHLORIDE 50 MG/ML
50 INJECTION INTRAMUSCULAR; INTRAVENOUS
Status: CANCELLED
Start: 2017-04-05

## 2017-03-08 RX ORDER — LORAZEPAM 2 MG/ML
0.5 INJECTION INTRAMUSCULAR EVERY 4 HOURS PRN
Status: CANCELLED
Start: 2017-03-29

## 2017-03-08 RX ORDER — MEPERIDINE HYDROCHLORIDE 25 MG/ML
25 INJECTION INTRAMUSCULAR; INTRAVENOUS; SUBCUTANEOUS EVERY 30 MIN PRN
Status: CANCELLED | OUTPATIENT
Start: 2017-03-29

## 2017-03-08 RX ORDER — MEPERIDINE HYDROCHLORIDE 25 MG/ML
25 INJECTION INTRAMUSCULAR; INTRAVENOUS; SUBCUTANEOUS EVERY 30 MIN PRN
Status: CANCELLED | OUTPATIENT
Start: 2017-03-22

## 2017-03-08 RX ORDER — ALBUTEROL SULFATE 0.83 MG/ML
2.5 SOLUTION RESPIRATORY (INHALATION)
Status: CANCELLED | OUTPATIENT
Start: 2017-03-29

## 2017-03-08 RX ORDER — LORAZEPAM 2 MG/ML
0.5 INJECTION INTRAMUSCULAR EVERY 4 HOURS PRN
Status: CANCELLED
Start: 2017-03-22

## 2017-03-08 RX ORDER — DIPHENHYDRAMINE HYDROCHLORIDE 50 MG/ML
50 INJECTION INTRAMUSCULAR; INTRAVENOUS
Status: CANCELLED
Start: 2017-03-22

## 2017-03-08 RX ORDER — MEPERIDINE HYDROCHLORIDE 25 MG/ML
25 INJECTION INTRAMUSCULAR; INTRAVENOUS; SUBCUTANEOUS EVERY 30 MIN PRN
Status: CANCELLED | OUTPATIENT
Start: 2017-04-05

## 2017-03-08 RX ORDER — DIPHENHYDRAMINE HYDROCHLORIDE 50 MG/ML
50 INJECTION INTRAMUSCULAR; INTRAVENOUS
Status: CANCELLED
Start: 2017-03-29

## 2017-03-08 RX ORDER — METHYLPREDNISOLONE SODIUM SUCCINATE 125 MG/2ML
125 INJECTION, POWDER, LYOPHILIZED, FOR SOLUTION INTRAMUSCULAR; INTRAVENOUS
Status: CANCELLED
Start: 2017-03-15

## 2017-03-08 RX ORDER — SODIUM CHLORIDE 9 MG/ML
1000 INJECTION, SOLUTION INTRAVENOUS CONTINUOUS PRN
Status: CANCELLED
Start: 2017-04-05

## 2017-03-08 RX ORDER — METHYLPREDNISOLONE SODIUM SUCCINATE 125 MG/2ML
125 INJECTION, POWDER, LYOPHILIZED, FOR SOLUTION INTRAMUSCULAR; INTRAVENOUS
Status: CANCELLED
Start: 2017-03-22

## 2017-03-08 RX ORDER — ALBUTEROL SULFATE 0.83 MG/ML
2.5 SOLUTION RESPIRATORY (INHALATION)
Status: CANCELLED | OUTPATIENT
Start: 2017-03-15

## 2017-03-08 RX ORDER — METHYLPREDNISOLONE SODIUM SUCCINATE 125 MG/2ML
125 INJECTION, POWDER, LYOPHILIZED, FOR SOLUTION INTRAMUSCULAR; INTRAVENOUS
Status: CANCELLED
Start: 2017-04-05

## 2017-03-08 RX ORDER — EPINEPHRINE 0.3 MG/.3ML
0.3 INJECTION SUBCUTANEOUS EVERY 5 MIN PRN
Status: CANCELLED | OUTPATIENT
Start: 2017-03-29

## 2017-03-08 RX ORDER — MEPERIDINE HYDROCHLORIDE 25 MG/ML
25 INJECTION INTRAMUSCULAR; INTRAVENOUS; SUBCUTANEOUS EVERY 30 MIN PRN
Status: CANCELLED | OUTPATIENT
Start: 2017-03-15

## 2017-03-08 RX ORDER — SODIUM CHLORIDE 9 MG/ML
1000 INJECTION, SOLUTION INTRAVENOUS CONTINUOUS PRN
Status: CANCELLED
Start: 2017-03-22

## 2017-03-08 RX ORDER — EPINEPHRINE 0.3 MG/.3ML
0.3 INJECTION SUBCUTANEOUS EVERY 5 MIN PRN
Status: CANCELLED | OUTPATIENT
Start: 2017-04-05

## 2017-03-08 RX ORDER — LORAZEPAM 2 MG/ML
0.5 INJECTION INTRAMUSCULAR EVERY 4 HOURS PRN
Status: CANCELLED
Start: 2017-03-15

## 2017-03-08 RX ORDER — ALBUTEROL SULFATE 0.83 MG/ML
2.5 SOLUTION RESPIRATORY (INHALATION)
Status: CANCELLED | OUTPATIENT
Start: 2017-04-05

## 2017-03-08 RX ORDER — LORAZEPAM 2 MG/ML
0.5 INJECTION INTRAMUSCULAR EVERY 4 HOURS PRN
Status: CANCELLED
Start: 2017-04-05

## 2017-03-08 RX ORDER — SODIUM CHLORIDE 9 MG/ML
1000 INJECTION, SOLUTION INTRAVENOUS CONTINUOUS PRN
Status: CANCELLED
Start: 2017-03-29

## 2017-03-08 RX ORDER — SODIUM CHLORIDE 9 MG/ML
1000 INJECTION, SOLUTION INTRAVENOUS CONTINUOUS PRN
Status: CANCELLED
Start: 2017-03-15

## 2017-03-08 RX ORDER — DIPHENHYDRAMINE HYDROCHLORIDE 50 MG/ML
50 INJECTION INTRAMUSCULAR; INTRAVENOUS
Status: CANCELLED
Start: 2017-03-15

## 2017-03-08 RX ORDER — ALBUTEROL SULFATE 0.83 MG/ML
2.5 SOLUTION RESPIRATORY (INHALATION)
Status: CANCELLED | OUTPATIENT
Start: 2017-03-22

## 2017-03-08 RX ORDER — EPINEPHRINE 0.3 MG/.3ML
0.3 INJECTION SUBCUTANEOUS EVERY 5 MIN PRN
Status: CANCELLED | OUTPATIENT
Start: 2017-03-15

## 2017-03-08 RX ORDER — ALBUTEROL SULFATE 90 UG/1
1-2 AEROSOL, METERED RESPIRATORY (INHALATION)
Status: CANCELLED
Start: 2017-03-22

## 2017-03-08 RX ORDER — ALBUTEROL SULFATE 90 UG/1
1-2 AEROSOL, METERED RESPIRATORY (INHALATION)
Status: CANCELLED
Start: 2017-03-15

## 2017-03-08 RX ORDER — EPINEPHRINE 0.3 MG/.3ML
0.3 INJECTION SUBCUTANEOUS EVERY 5 MIN PRN
Status: CANCELLED | OUTPATIENT
Start: 2017-03-22

## 2017-03-08 RX ADMIN — BORTEZOMIB 2.1 MG: 3.5 INJECTION, POWDER, LYOPHILIZED, FOR SOLUTION INTRAVENOUS; SUBCUTANEOUS at 11:30

## 2017-03-08 ASSESSMENT — PAIN SCALES - GENERAL: PAINLEVEL: NO PAIN (0)

## 2017-03-08 NOTE — MR AVS SNAPSHOT
After Visit Summary   3/8/2017    Roc Parkinson    MRN: [id number]           Patient Information     Date Of Birth          7/7/1926        Visit Information        Provider Department      3/8/2017 9:30 AM  INFUSION CHAIR 4 [facility]        Today's Diagnoses     Multiple myeloma not having achieved remission (H)    -  1       Follow-ups after your visit        Your next 10 appointments already scheduled     Mar 15, 2017  9:30 AM CDT   Level 5 with  INFUSION CHAIR 7   [facility] ([facility])    [facility]  [address]  [facility] [address]   [phone]            Mar 15, 2017  9:45 AM CDT   Return Visit with Gretel Quinn MD   [facility] ([facility])    [facility]  [address]  [place] [address]   [phone]            Mar 20, 2017  9:00 AM CDT   SHORT with Dickson Reich MD   [facility] ([facility])    [address] [place] [address]   [phone]            Mar 21, 2017 10:00 AM CDT   Cystoscopy with Radha Jackson MD, [facility] ([facility])    [address]  [address]  [facility] [address]   [phone]            Mar 22, 2017  9:30 AM CDT   Level 5 with  INFUSION CHAIR 5   [facility] ([facility])    [facility]  [address]  [place] [address]   [phone]            Apr 03, 2017 10:30 AM CDT   Diabetic Education with  DIABETIC ED RESOURCE   [facility]    [address] [phone]              Who to contact     If you have questions or need  "follow up information about today's clinic visit or your schedule please contact [facility] directly at [phone].  Normal or non-critical lab and imaging results will be communicated to you by [employer], letter or phone within 4 business days after the clinic has received the results. If you do not hear from us within 7 days, please contact the clinic through [employer] or phone. If you have a critical or abnormal lab result, we will notify you by phone as soon as possible.  Submit refill requests through [employer] or call your pharmacy and they will forward the refill request to us. Please allow 3 business days for your refill to be completed.          Additional Information About Your Visit        [employer] Information     [employer] lets you send messages to your doctor, view your test results, renew your prescriptions, schedule appointments and more. To sign up, go to [email or web address] . Click on \"Log in\" on the left side of the screen, which will take you to the Welcome page. Then click on \"Sign up Now\" on the right side of the page.     You will be asked to enter the access code listed below, as well as some personal information. Please follow the directions to create your username and password.     Your access code is: [email or web address]  Expires: 3/28/2017 12:15 PM     Your access code will  in 90 days. If you need help or a new code, please call your [place] clinic or [phone].        Care EveryWhere ID     This is your Care EveryWhere ID. This could be used by other organizations to access your [place] medical records  [id number]        Your Vitals Were     Pulse Temperature Respirations Height Pulse Oximetry BMI (Body Mass Index)    67 97.8  F (36.6  C) (Oral) 16 1.778 m (5' 10\") 99% 27.69 kg/m2       Blood Pressure from Last 3 Encounters:   17 139/70   17 155/83   17 142/72    Weight from Last 3 Encounters:   17 87.5 kg (193 lb)   17 87 " kg (191 lb 12.8 oz)   02/21/17 88 kg (194 lb)              We Performed the Following     CBC with platelets differential     Treatment Conditions        Primary Care Provider Office Phone # Fax #    Dickson Reich [phone]       [facility] [address]  [facility] [address]        Thank you!     Thank you for choosing [facility]  for your care. Our goal is always to provide you with excellent care. Hearing back from our patients is one way we can continue to improve our services. Please take a few minutes to complete the written survey that you may receive in the mail after your visit with us. Thank you!             Your Updated Medication List - Protect others around you: Learn how to safely use, store and throw away your medicines at www.disposemymeds.org.          This list is accurate as of: 3/8/17  1:21 PM.  Always use your most recent med list.                   Brand Name Dispense Instructions for use    acyclovir 400 MG tablet    ZOVIRAX    60 tablet    Take 1 tablet (400 mg) by mouth 2 times daily Viral Prophylaxis.       amLODIPine 5 MG tablet    NORVASC    30 tablet    TAKE 1 TABLET BY MOUTH DAILY       ciprofloxacin 250 MG tablet    CIPRO    20 tablet    Take 1 tablet two times daily for 10 days.       clotrimazole-betamethasone cream    LOTRISONE    30 g    Apply topically to the head of the penis two times a day for up to 2 weeks.       dexamethasone 4 MG tablet    DECADRON    40 tablet    Take 10 tablets (40 mg) by mouth every 7 days for 4 doses Take daily on Days 1, 8, 15, and 22. Cycles 3 and 4 ONLY.       ferrous sulfate 325 (65 FE) MG tablet    IRON     Take 325 mg by mouth daily (with breakfast)       FREESTYLE LITE test strip   Generic drug:  blood glucose monitoring     200 each    USE TO TEST BLOOD GLUCOSE TWICE DAILY       gemfibrozil 600 MG tablet    LOPID    180 tablet    TAKE 1 TABLET BY MOUTH TWICE DAILY        glipiZIDE 10 MG tablet    GLUCOTROL    180 tablet    TAKE 1 TABLET BY MOUTH TWICE DAILY BEFORE A MEAL       insulin aspart 100 UNIT/ML injection    NovoLOG PEN    12 mL    Inject 1-5 Units Subcutaneous 4 times daily (with meals and nightly) Before meal correction: 140-225 - take 1 unit  226-310 - take 2 units 311-395 - take 3 units 396-480 - take 4 units >481 - take 5 units Bedtime correction of: 200-299 give 1 unit 300-399 take 2 units 400 or greater take 3 units       * insulin pen needle 31G X 6 MM     100 each    Use 2 x daily or as directed.       * B-D U/F 31G X 8 MM   Generic drug:  insulin pen needle          levothyroxine 25 MCG tablet    SYNTHROID/LEVOTHROID    90 tablet    TAKE 1 TABLET BY MOUTH EVERY DAY.       lisinopril 30 MG tablet    PRINIVIL,ZESTRIL    90 tablet    TAKE 1 TABLET BY MOUTH EVERY DAY       metoprolol 25 MG tablet    LOPRESSOR    180 tablet    TAKE 1 TABLET BY MOUTH TWICE DAILY       omeprazole 20 MG CR capsule    priLOSEC    90 capsule    TAKE 1 CAPSULE BY MOUTH EVERY DAY       ondansetron 8 MG tablet    ZOFRAN    10 tablet    Take 1 tablet (8 mg) by mouth every 8 hours as needed (Nausea/Vomiting)       polyethylene glycol powder    MIRALAX/GLYCOLAX         prochlorperazine 5 MG tablet    COMPAZINE    30 tablet    Take 1 tablet (5 mg) by mouth every 6 hours as needed (Nausea/Vomiting)       Selenium 200 MCG Tabs      Take 100 mcg by mouth daily. Pt takes one half tab of the 200 mcg daily       tamsulosin 0.4 MG capsule    FLOMAX    90 capsule    TAKE 1 CAPSULE BY MOUTH DAILY       VITAMIN C PO      Take 1,000 mg by mouth daily       VITAMIN D3 PO      Take 1,000 Units by mouth daily       * Notice:  This list has 2 medication(s) that are the same as other medications prescribed for you. Read the directions carefully, and ask your doctor or other care provider to review them with you.

## 2017-03-08 NOTE — PROGRESS NOTES
Infusion Nursing Note:  Roc Parkinson presents today for C3D22 Velcade.    Patient seen by provider today: No   present during visit today: Not Applicable.    Note: N/A.    Intravenous Access:  Lab draw site Right AC, Needle type Butterfly, Gauge 23.  Labs drawn without difficulty.    Treatment Conditions:  Lab Results   Component Value Date    HGB 8.5 03/08/2017     Lab Results   Component Value Date    WBC 3.3 03/08/2017      Lab Results   Component Value Date    ANEU 2.6 03/08/2017     Lab Results   Component Value Date    PLT 40 03/08/2017      Results reviewed, labs did NOT meet treatment parameters. OK to proceed with velcade with plt 40,000 per Dr. Quinn, refer to notes on treatment plan. Patient did not meet parameters for PRBCs or platelets.      Post Infusion Assessment:  Patient tolerated injection without incident.  Site patent and intact, free from redness, edema or discomfort.  No evidence of extravasations.  Access discontinued per protocol.    Discharge Plan:   Discharge instructions reviewed with: Patient.  Patient and/or family verbalized understanding of discharge instructions and all questions answered.  Copy of AVS reviewed with patient and/or family.  Patient will return 3/15 for next appointment.  Patient discharged in stable condition accompanied by: self.  Departure Mode: Ambulatory.    Esthela Vargas, KENDALL Carpenter RN (discharge)

## 2017-03-11 DIAGNOSIS — Q40.1 CONGENITAL HIATUS HERNIA: ICD-10-CM

## 2017-03-13 DIAGNOSIS — N18.30 TYPE 2 DIABETES MELLITUS WITH STAGE 3 CHRONIC KIDNEY DISEASE, WITH LONG-TERM CURRENT USE OF INSULIN (H): ICD-10-CM

## 2017-03-13 DIAGNOSIS — I10 ESSENTIAL HYPERTENSION, BENIGN: ICD-10-CM

## 2017-03-13 DIAGNOSIS — E11.22 TYPE 2 DIABETES MELLITUS WITH STAGE 3 CHRONIC KIDNEY DISEASE, WITH LONG-TERM CURRENT USE OF INSULIN (H): ICD-10-CM

## 2017-03-13 DIAGNOSIS — Z79.4 TYPE 2 DIABETES MELLITUS WITH STAGE 3 CHRONIC KIDNEY DISEASE, WITH LONG-TERM CURRENT USE OF INSULIN (H): ICD-10-CM

## 2017-03-13 NOTE — TELEPHONE ENCOUNTER
omeprozole     Last Written Prescription Date:  4/8/16  Last Fill Quantity: 90,   # refills: 2  Last [facility]: 2/20/17    Future Office visit:    Next 5 appointments (look out 90 days)     Mar 15, 2017  9:45 AM CDT   Return Visit with Gretel Quinn MD   [facility] ([facility])    [facility]  [address]  [facility] [address]   [phone]            Mar 20, 2017  9:00 AM CDT   SHORT with Dickson Reich MD   [facility]    [address] [place] [phone]

## 2017-03-14 RX ORDER — PEN NEEDLE, DIABETIC 31 GX5/16"
NEEDLE, DISPOSABLE MISCELLANEOUS
Qty: 100 EACH | Refills: 5 | Status: SHIPPED | OUTPATIENT
Start: 2017-03-14 | End: 2017-04-03

## 2017-03-14 RX ORDER — AMLODIPINE BESYLATE 5 MG/1
TABLET ORAL
Qty: 30 TABLET | Refills: 3 | Status: SHIPPED | OUTPATIENT
Start: 2017-03-14 | End: 2017-01-01

## 2017-03-14 NOTE — TELEPHONE ENCOUNTER
amLODIPine (NORVASC) 5 MG tablet      Last Written Prescription Date: 2/14/2017  Last Fill Quantity: 30, # refills: 0  Last Office Visit with [facility], P or  Health prescribing provider: 2/14/2017  Next 5 appointments (look out 90 days)     Mar 15, 2017  9:45 AM CDT   Return Visit with Gretel Quinn MD   [facility] ([facility])    [facility] [place]  [address]  [place] [address]   [phone]            Mar 20, 2017  9:00 AM CDT   SHORT with Dickson Reich MD   [facility] ([facility])    [address] [place] [phone]                   Potassium   Date Value Ref Range Status   03/01/2017 4.9 3.4 - 5.3 mmol/L Final     Creatinine   Date Value Ref Range Status   03/01/2017 1.05 0.66 - 1.25 mg/dL Final     BP Readings from Last 3 Encounters:   03/08/17 139/70   03/01/17 155/83   02/22/17 142/72     B-D U/F 31G X 8 MM insulin pen needle      Last Written Prescription Date: 1/31/2017  Last Fill Quantity: 100,  # refills: 11   Last Office Visit with [facility], P or  Health prescribing provider: 2/20/2017                                         Next 5 appointments (look out 90 days)     Mar 15, 2017  9:45 AM CDT   Return Visit with Gretel Quinn MD   [facility] ([facility])    [facility] [place]  [address]  [place] [address] [phone]            Mar 20, 2017  9:00 AM CDT   SHORT with Dickson Reich MD   [facility] ([facility])    [address] [place] [phone]

## 2017-03-15 ENCOUNTER — ONCOLOGY VISIT (OUTPATIENT)
Dept: ONCOLOGY | Facility: CLINIC | Age: 82
End: 2017-03-15
Attending: INTERNAL MEDICINE
Payer: MEDICARE

## 2017-03-15 ENCOUNTER — HOSPITAL ENCOUNTER (OUTPATIENT)
Facility: CLINIC | Age: 82
Setting detail: SPECIMEN
Discharge: HOME | End: 2017-03-15
Attending: INTERNAL MEDICINE | Admitting: INTERNAL MEDICINE
Payer: MEDICARE

## 2017-03-15 ENCOUNTER — TELEPHONE (OUTPATIENT)
Dept: FAMILY MEDICINE | Facility: CLINIC | Age: 82
End: 2017-03-15

## 2017-03-15 ENCOUNTER — INFUSION THERAPY VISIT (OUTPATIENT)
Dept: INFUSION THERAPY | Facility: CLINIC | Age: 82
End: 2017-03-15
Attending: INTERNAL MEDICINE
Payer: MEDICARE

## 2017-03-15 VITALS
TEMPERATURE: 98 F | HEART RATE: 65 BPM | RESPIRATION RATE: 18 BRPM | SYSTOLIC BLOOD PRESSURE: 140 MMHG | DIASTOLIC BLOOD PRESSURE: 66 MMHG

## 2017-03-15 VITALS
WEIGHT: 194 LBS | HEART RATE: 65 BPM | BODY MASS INDEX: 27.77 KG/M2 | SYSTOLIC BLOOD PRESSURE: 140 MMHG | TEMPERATURE: 98.2 F | HEIGHT: 70 IN | DIASTOLIC BLOOD PRESSURE: 67 MMHG | RESPIRATION RATE: 16 BRPM

## 2017-03-15 DIAGNOSIS — E87.5 HYPERKALEMIA: ICD-10-CM

## 2017-03-15 DIAGNOSIS — C90.00 MULTIPLE MYELOMA NOT HAVING ACHIEVED REMISSION (H): Primary | ICD-10-CM

## 2017-03-15 DIAGNOSIS — D53.9 MACROCYTIC ANEMIA: ICD-10-CM

## 2017-03-15 LAB
ABO + RH BLD: NORMAL
ABO + RH BLD: NORMAL
ALBUMIN SERPL-MCNC: 2.7 G/DL (ref 3.4–5)
ALP SERPL-CCNC: 71 U/L (ref 40–150)
ALT SERPL W P-5'-P-CCNC: 19 U/L (ref 0–70)
ANION GAP SERPL CALCULATED.3IONS-SCNC: 7 MMOL/L (ref 3–14)
AST SERPL W P-5'-P-CCNC: 13 U/L (ref 0–45)
BASOPHILS # BLD AUTO: 0 10E9/L (ref 0–0.2)
BASOPHILS NFR BLD AUTO: 0 %
BILIRUB SERPL-MCNC: 0.3 MG/DL (ref 0.2–1.3)
BLD GP AB SCN SERPL QL: NORMAL
BLD PROD TYP BPU: NORMAL
BLD PROD TYP BPU: NORMAL
BLD UNIT ID BPU: 0
BLOOD BANK CMNT PATIENT-IMP: NORMAL
BLOOD PRODUCT CODE: NORMAL
BPU ID: NORMAL
BUN SERPL-MCNC: 33 MG/DL (ref 7–30)
CALCIUM SERPL-MCNC: 8.9 MG/DL (ref 8.5–10.1)
CHLORIDE SERPL-SCNC: 103 MMOL/L (ref 94–109)
CO2 SERPL-SCNC: 22 MMOL/L (ref 20–32)
CREAT SERPL-MCNC: 1.05 MG/DL (ref 0.66–1.25)
DIFFERENTIAL METHOD BLD: ABNORMAL
EOSINOPHIL # BLD AUTO: 0 10E9/L (ref 0–0.7)
EOSINOPHIL NFR BLD AUTO: 0.3 %
ERYTHROCYTE [DISTWIDTH] IN BLOOD BY AUTOMATED COUNT: 26.1 % (ref 10–15)
GFR SERPL CREATININE-BSD FRML MDRD: 66 ML/MIN/1.7M2
GLUCOSE SERPL-MCNC: 276 MG/DL (ref 70–99)
HCT VFR BLD AUTO: 21.8 % (ref 40–53)
HGB BLD-MCNC: 7.3 G/DL (ref 13.3–17.7)
IMM GRANULOCYTES # BLD: 0 10E9/L (ref 0–0.4)
IMM GRANULOCYTES NFR BLD: 0.3 %
LYMPHOCYTES # BLD AUTO: 0.6 10E9/L (ref 0.8–5.3)
LYMPHOCYTES NFR BLD AUTO: 20.2 %
MCH RBC QN AUTO: 34.1 PG (ref 26.5–33)
MCHC RBC AUTO-ENTMCNC: 33.5 G/DL (ref 31.5–36.5)
MCV RBC AUTO: 102 FL (ref 78–100)
MONOCYTES # BLD AUTO: 0.1 10E9/L (ref 0–1.3)
MONOCYTES NFR BLD AUTO: 2.9 %
NEUTROPHILS # BLD AUTO: 2.3 10E9/L (ref 1.6–8.3)
NEUTROPHILS NFR BLD AUTO: 76.3 %
NRBC # BLD AUTO: 0 10*3/UL
NRBC BLD AUTO-RTO: 0 /100
NUM BPU REQUESTED: 1
PLATELET # BLD AUTO: 43 10E9/L (ref 150–450)
POTASSIUM SERPL-SCNC: 5.5 MMOL/L (ref 3.4–5.3)
PROT SERPL-MCNC: 9.6 G/DL (ref 6.8–8.8)
RBC # BLD AUTO: 2.14 10E12/L (ref 4.4–5.9)
SODIUM SERPL-SCNC: 132 MMOL/L (ref 133–144)
SPECIMEN EXP DATE BLD: NORMAL
TRANSFUSION STATUS PATIENT QL: NORMAL
TRANSFUSION STATUS PATIENT QL: NORMAL
WBC # BLD AUTO: 3.1 10E9/L (ref 4–11)

## 2017-03-15 PROCEDURE — 00000402 ZZHCL STATISTIC TOTAL PROTEIN: Performed by: INTERNAL MEDICINE

## 2017-03-15 PROCEDURE — 86334 IMMUNOFIX E-PHORESIS SERUM: CPT | Performed by: INTERNAL MEDICINE

## 2017-03-15 PROCEDURE — 86901 BLOOD TYPING SEROLOGIC RH(D): CPT | Performed by: INTERNAL MEDICINE

## 2017-03-15 PROCEDURE — 80053 COMPREHEN METABOLIC PANEL: CPT | Performed by: INTERNAL MEDICINE

## 2017-03-15 PROCEDURE — 99214 OFFICE O/P EST MOD 30 MIN: CPT | Performed by: INTERNAL MEDICINE

## 2017-03-15 PROCEDURE — 85025 COMPLETE CBC W/AUTO DIFF WBC: CPT | Performed by: INTERNAL MEDICINE

## 2017-03-15 PROCEDURE — P9016 RBC LEUKOCYTES REDUCED: HCPCS | Performed by: INTERNAL MEDICINE

## 2017-03-15 PROCEDURE — 86900 BLOOD TYPING SEROLOGIC ABO: CPT | Performed by: INTERNAL MEDICINE

## 2017-03-15 PROCEDURE — 86850 RBC ANTIBODY SCREEN: CPT | Performed by: INTERNAL MEDICINE

## 2017-03-15 PROCEDURE — 86923 COMPATIBILITY TEST ELECTRIC: CPT | Performed by: INTERNAL MEDICINE

## 2017-03-15 PROCEDURE — 25000128 H RX IP 250 OP 636: Mod: JW | Performed by: INTERNAL MEDICINE

## 2017-03-15 PROCEDURE — 83883 ASSAY NEPHELOMETRY NOT SPEC: CPT | Performed by: INTERNAL MEDICINE

## 2017-03-15 PROCEDURE — 84165 PROTEIN E-PHORESIS SERUM: CPT | Performed by: INTERNAL MEDICINE

## 2017-03-15 PROCEDURE — 36430 TRANSFUSION BLD/BLD COMPNT: CPT

## 2017-03-15 PROCEDURE — 82784 ASSAY IGA/IGD/IGG/IGM EACH: CPT | Performed by: INTERNAL MEDICINE

## 2017-03-15 PROCEDURE — 99211 OFF/OP EST MAY X REQ PHY/QHP: CPT | Mod: 25

## 2017-03-15 PROCEDURE — 96401 CHEMO ANTI-NEOPL SQ/IM: CPT

## 2017-03-15 RX ORDER — SODIUM POLYSTYRENE SULFONATE 15 G/60ML
15 SUSPENSION ORAL; RECTAL ONCE
Qty: 60 ML | Refills: 0 | Status: SHIPPED | OUTPATIENT
Start: 2017-03-15 | End: 2017-03-15

## 2017-03-15 RX ORDER — DEXAMETHASONE 4 MG/1
40 TABLET ORAL
Qty: 40 TABLET | Refills: 0 | Status: SHIPPED | OUTPATIENT
Start: 2017-03-15 | End: 2017-04-06

## 2017-03-15 RX ADMIN — BORTEZOMIB 2.1 MG: 3.5 INJECTION, POWDER, LYOPHILIZED, FOR SOLUTION INTRAVENOUS; SUBCUTANEOUS at 13:27

## 2017-03-15 NOTE — PROGRESS NOTES
Infusion Nursing Note:  Roc Parkinson presents today for possible transfusion.    Patient seen by provider today: No   present during visit today: Not Applicable.    Note: N/A.    Intravenous Access:  Peripheral IV placed.    Treatment Conditions:  Lab Results   Component Value Date    HGB 7.3 03/15/2017     Lab Results   Component Value Date    WBC 3.1 03/15/2017      Lab Results   Component Value Date    ANEU 2.3 03/15/2017     Lab Results   Component Value Date    PLT 43 03/15/2017      Lab Results   Component Value Date     03/15/2017                   Lab Results   Component Value Date    POTASSIUM 5.5 03/15/2017           No results found for: MAG         Lab Results   Component Value Date    CR 1.05 03/15/2017                   Lab Results   Component Value Date    MICHELLE 8.9 03/15/2017                Lab Results   Component Value Date    BILITOTAL 0.3 03/15/2017           Lab Results   Component Value Date    ALBUMIN 2.7 03/15/2017                    Lab Results   Component Value Date    ALT 19 03/15/2017           Lab Results   Component Value Date    AST 13 03/15/2017     Results reviewed, labs MET treatment parameters, ok to proceed with treatment.  Blood transfusion consent signed 12/21/17.      Post Infusion Assessment:  Patient tolerated infusion without incident.  Patient tolerated injection without incident.  Blood return noted pre and post infusion.  Site patent and intact, free from redness, edema or discomfort.  No evidence of extravasations.  Access discontinued per protocol.    Discharge Plan:   Copy of AVS reviewed with patient and/or family. Patient discharged in stable condition accompanied by: self.  Departure Mode: Ambulatory.    Doug Rios RN

## 2017-03-15 NOTE — TELEPHONE ENCOUNTER
FREESTYLE LITE test strip   Patient states he now tests QID please send a new rx with updated directions, thank you

## 2017-03-15 NOTE — MR AVS SNAPSHOT
After Visit Summary   3/15/2017    Roc Parkinson    MRN: [id number]           Patient Information     Date Of Birth          7/7/1926        Visit Information        Provider Department      3/15/2017 9:30 AM SH INFUSION CHAIR 7 [facility]        Today's Diagnoses     Multiple myeloma not having achieved remission (H)    -  1    Macrocytic anemia        Hyperkalemia           Follow-ups after your visit        Your next 10 appointments already scheduled     Mar 20, 2017  9:00 AM CDT   SHORT with Dickson Reich MD   [facility] ([facility])    [address]  [facility] [address]   [phone]            Mar 21, 2017 10:00 AM CDT   Cystoscopy with Radha Jackson MD, [facility] [place] ([facility] [place])    [address]  [address]  [facility] [address]   [phone]            Mar 22, 2017  9:30 AM CDT   Level 5 with SH INFUSION CHAIR 5   [facility] ([facility])    [facility]  [address]  [place] [address]   [phone]            Mar 29, 2017  9:00 AM CDT   Level 5 with SH INFUSION CHAIR 5   [facility] ([facility])    [facility]  [address]  [place] [address]   [phone]            Apr 03, 2017 10:30 AM CDT   Diabetic Education with  DIABETIC ED RESOURCE   [facility] ([facility])    [address] [address] [phone]            Apr 05, 2017  9:00 AM CDT   Level 5 with SH INFUSION CHAIR 4   [facility] ([facility])    [facility]  [address]  [place] [address]   [phone]             Apr 12, 2017  9:00 AM CDT   Level 5 with  INFUSION CHAIR 7   [facility] ([facility])    [facility]  [address]  [place] [address]   [phone]            Apr 12, 2017  9:30 AM CDT   Return Visit with Gretel Quinn MD   [facility] ([facility])    [facility] [place]  [address]  [place] [address]   [phone]            Apr 19, 2017  9:00 AM CDT   Level 5 with  INFUSION CHAIR 6   [facility] ([facility])    [facility] [place]  [address]  [place] [address]   [phone]              Future tests that were ordered for you today     Open Standing Orders        Priority Remaining Interval Expires Ordered    Red blood cell prepare order unit Routine 99/100 CONDITIONAL (SPECIFY) BLOOD  3/15/2017    Transfuse red blood cell unit Routine 99/100 TRANSFUSE 1 UNIT  3/15/2017            Who to contact     If you have questions or need follow up information about today's clinic visit or your schedule please contact [facility] directly at [phone].  Normal or non-critical lab and imaging results will be communicated to you by [employer], letter or phone within 4 business days after the clinic has received the results. If you do not hear from us within 7 days, please contact the clinic through [employer] or phone. If you have a critical or abnormal lab result, we will notify you by phone as soon as possible.  Submit refill requests through [employer] or call your pharmacy and they will forward the refill request to us. Please allow 3 business days for your refill to be completed.          Additional Information About Your Visit        [employer] Information     [employer] lets you send messages to your doctor, view your test results, renew your prescriptions, schedule  "appointments and more. To sign up, go to [email or web address] . Click on \"Log in\" on the left side of the screen, which will take you to the Welcome page. Then click on \"Sign up Now\" on the right side of the page.     You will be asked to enter the access code listed below, as well as some personal information. Please follow the directions to create your username and password.     Your access code is: [email or web address]  Expires: 3/28/2017  1:15 PM     Your access code will  in 90 days. If you need help or a new code, please call your [place] clinic or [phone].        Care EveryWhere ID     This is your Care EveryWhere ID. This could be used by other organizations to access your [place] medical records  [id number]        Your Vitals Were     Pulse Temperature Respirations Height BMI (Body Mass Index)       65 98.2  F (36.8  C) (Oral) 16 1.778 m (5' 10\") 27.84 kg/m2        Blood Pressure from Last 3 Encounters:   03/15/17 140/66   03/15/17 140/67   17 139/70    Weight from Last 3 Encounters:   03/15/17 88 kg (194 lb)   17 87.5 kg (193 lb)   17 87 kg (191 lb 12.8 oz)              We Performed the Following     ABO/Rh type and screen     Blood component     Comprehensive metabolic panel     Obtain affirmation of informed consent     Red blood cell prepare order unit     Transfuse red blood cell unit     Treatment Conditions     Treatment Conditions          Today's Medication Changes          These changes are accurate as of: 3/15/17  4:22 PM.  If you have any questions, ask your nurse or doctor.               Start taking these medicines.        Dose/Directions    sodium polystyrene 15 GM/60ML suspension   Commonly known as:  KAYEXALATE   Used for:  Hyperkalemia, Multiple myeloma not having achieved remission (H)        Dose:  15 g   Take 60 mLs (15 g) by mouth once for 1 dose   Quantity:  60 mL   Refills:  0         These medicines have changed or have updated prescriptions.        " Dose/Directions    * dexamethasone 4 MG tablet   Commonly known as:  DECADRON   This may have changed:  Another medication with the same name was added. Make sure you understand how and when to take each.   Used for:  Multiple myeloma not having achieved remission (H)        Dose:  40 mg   Take 10 tablets (40 mg) by mouth every 7 days for 4 doses Take daily on Days 1, 8, 15, and 22. Cycles 3 and 4 ONLY.   Quantity:  40 tablet   Refills:  0       * dexamethasone 4 MG tablet   Commonly known as:  DECADRON   This may have changed:  You were already taking a medication with the same name, and this prescription was added. Make sure you understand how and when to take each.   Used for:  Multiple myeloma not having achieved remission (H)        Dose:  40 mg   Take 10 tablets (40 mg) by mouth every 7 days for 4 doses Take daily on Days 1, 8, 15, and 22. Cycles 3 and 4 ONLY.   Quantity:  40 tablet   Refills:  0       * Notice:  This list has 2 medication(s) that are the same as other medications prescribed for you. Read the directions carefully, and ask your doctor or other care provider to review them with you.         Where to get your medicines      These medications were sent to [id number] - [facility] [address] AT [facility] [address], [facility] [address]    Hours:  24-hours Phone:  [phone]     dexamethasone 4 MG tablet    sodium polystyrene 15 GM/60ML suspension                Primary Care Provider Office Phone # Fax #    Dickson Reich [phone]       [facility] [address]  [facility] [address]        Thank you!     Thank you for choosing [facility]  for your care. Our goal is always to provide you with excellent care. Hearing back from our patients is one way we can continue to improve our services. Please take a few minutes to complete the written survey that you may receive  in the mail after your visit with us. Thank you!             Your Updated Medication List - Protect others around you: Learn how to safely use, store and throw away your medicines at www.disposemymeds.org.          This list is accurate as of: 3/15/17  4:22 PM.  Always use your most recent med list.                   Brand Name Dispense Instructions for use    acyclovir 400 MG tablet    ZOVIRAX    60 tablet    Take 1 tablet (400 mg) by mouth 2 times daily Viral Prophylaxis.       amLODIPine 5 MG tablet    NORVASC    30 tablet    TAKE 1 TABLET BY MOUTH DAILY       * B-D U/F 31G X 8 MM   Generic drug:  insulin pen needle          * B-D U/F 31G X 8 MM   Generic drug:  insulin pen needle     100 each    USE TWICE DAILY OR AS DIRECTED       ciprofloxacin 250 MG tablet    CIPRO    20 tablet    Take 1 tablet two times daily for 10 days.       clotrimazole-betamethasone cream    LOTRISONE    30 g    Apply topically to the head of the penis two times a day for up to 2 weeks.       * dexamethasone 4 MG tablet    DECADRON    40 tablet    Take 10 tablets (40 mg) by mouth every 7 days for 4 doses Take daily on Days 1, 8, 15, and 22. Cycles 3 and 4 ONLY.       * dexamethasone 4 MG tablet    DECADRON    40 tablet    Take 10 tablets (40 mg) by mouth every 7 days for 4 doses Take daily on Days 1, 8, 15, and 22. Cycles 3 and 4 ONLY.       ferrous sulfate 325 (65 FE) MG tablet    IRON     Take 325 mg by mouth daily (with breakfast)       FREESTYLE LITE test strip   Generic drug:  blood glucose monitoring     200 each    USE TO TEST BLOOD GLUCOSE TWICE DAILY       gemfibrozil 600 MG tablet    LOPID    180 tablet    TAKE 1 TABLET BY MOUTH TWICE DAILY       glipiZIDE 10 MG tablet    GLUCOTROL    180 tablet    TAKE 1 TABLET BY MOUTH TWICE DAILY BEFORE A MEAL       insulin aspart 100 UNIT/ML injection    NovoLOG PEN    12 mL    Inject 1-5 Units Subcutaneous 4 times daily (with meals and nightly) Before meal correction: 140-225 - take 1  unit  226-310 - take 2 units 311-395 - take 3 units 396-480 - take 4 units >481 - take 5 units Bedtime correction of: 200-299 give 1 unit 300-399 take 2 units 400 or greater take 3 units       levothyroxine 25 MCG tablet    SYNTHROID/LEVOTHROID    90 tablet    TAKE 1 TABLET BY MOUTH EVERY DAY.       lisinopril 30 MG tablet    PRINIVIL,ZESTRIL    90 tablet    TAKE 1 TABLET BY MOUTH EVERY DAY       metoprolol 25 MG tablet    LOPRESSOR    180 tablet    TAKE 1 TABLET BY MOUTH TWICE DAILY       omeprazole 20 MG CR capsule    priLOSEC    90 capsule    TAKE 1 CAPSULE BY MOUTH EVERY DAY       ondansetron 8 MG tablet    ZOFRAN    10 tablet    Take 1 tablet (8 mg) by mouth every 8 hours as needed (Nausea/Vomiting)       polyethylene glycol powder    MIRALAX/GLYCOLAX         prochlorperazine 5 MG tablet    COMPAZINE    30 tablet    Take 1 tablet (5 mg) by mouth every 6 hours as needed (Nausea/Vomiting)       Selenium 200 MCG Tabs      Take 100 mcg by mouth daily. Pt takes one half tab of the 200 mcg daily       sodium polystyrene 15 GM/60ML suspension    KAYEXALATE    60 mL    Take 60 mLs (15 g) by mouth once for 1 dose       tamsulosin 0.4 MG capsule    FLOMAX    90 capsule    TAKE 1 CAPSULE BY MOUTH DAILY       VITAMIN C PO      Take 1,000 mg by mouth daily       VITAMIN D3 PO      Take 1,000 Units by mouth daily       * Notice:  This list has 4 medication(s) that are the same as other medications prescribed for you. Read the directions carefully, and ask your doctor or other care provider to review them with you.

## 2017-03-15 NOTE — MR AVS SNAPSHOT
After Visit Summary   3/15/2017    Roc Parkinson    MRN: [id number]           Patient Information     Date Of Birth          7/7/1926        Visit Information        Provider Department      3/15/2017 9:45 AM Gretel Quinn MD [facility]        Today's Diagnoses     Multiple myeloma not having achieved remission (H)    -  1      Care Instructions    1.  Continue Velcade and dexamethasone. Velcade today.  2.  CBC, diff weekly- transfuse to keep hemoglobin of 8 and platelet of 10,000.   3.  RTC MD 4 weeks.   4.  Continue Zometa  5.  Labs today- SPEP, IFXN, light chains  6-  Please fax note to Dr. Salmeron [facility]        Follow-ups after your visit        Your next 10 appointments already scheduled     Mar 20, 2017  9:00 AM CDT   SHORT with Dickson Reich MD   [facility] ([facility])    [address] [place] [address]   [phone]            Mar 21, 2017 10:00 AM CDT   Cystoscopy with Radha Jackson MD, [facility] [place] ([facility] [place])    [address]  [address]  [facility] [address] [phone]            Mar 22, 2017  9:30 AM CDT   Level 5 with SH INFUSION CHAIR 5   [facility] ([facility])    [facility]  [address]  [facility] [address]   [phone]            Mar 29, 2017  9:00 AM CDT   Level 5 with SH INFUSION CHAIR 5   [facility] ([facility])    [facility]  [address]  [facility] [address]   [phone]            Apr 03, 2017 10:30 AM CDT   Diabetic Education with  DIABETIC ED RESOURCE   [facility] ([facility])    [address] [id number] [phone]             Apr 05, 2017  9:00 AM CDT   Level 5 with SH INFUSION CHAIR 4   [facility] ([facility])    [facility] [place]  [address]  [place] [address]   [phone]            Apr 12, 2017  9:00 AM CDT   Level 5 with SH INFUSION CHAIR 7   [facility] ([facility])    [facility] [place]  [address]  [place] [address]   [phone]            Apr 12, 2017  9:30 AM CDT   Return Visit with Gretel Quinn MD   [facility] ([facility])    [facility] [place]  [address]  [place] [address]   [phone]            Apr 19, 2017  9:00 AM CDT   Level 5 with SH INFUSION CHAIR 6   [facility] ([facility])    [facility] [place] [address]  [place] [address]   [phone]              Future tests that were ordered for you today     Open Standing Orders        Priority Remaining Interval Expires Ordered    Red blood cell prepare order unit Routine 99/100 CONDITIONAL (SPECIFY) BLOOD  3/15/2017    Transfuse red blood cell unit Routine 99/100 TRANSFUSE 1 UNIT  3/15/2017            Who to contact     If you have questions or need follow up information about today's clinic visit or your schedule please contact [facility] directly at [phone].  Normal or non-critical lab and imaging results will be communicated to you by [employer], letter or phone within 4 business days after the clinic has received the results. If you do not hear from us within 7 days, please contact the clinic through [employer] or phone. If you have a critical or abnormal lab result, we will notify you by phone as soon as possible.  Submit refill requests through [employer] or call your pharmacy and they will forward the refill request to us. Please allow 3 business days  "for your refill to be completed.          Additional Information About Your Visit        [employer] Information     [employer] lets you send messages to your doctor, view your test results, renew your prescriptions, schedule appointments and more. To sign up, go to [email or web address] . Click on \"Log in\" on the left side of the screen, which will take you to the Welcome page. Then click on \"Sign up Now\" on the right side of the page.     You will be asked to enter the access code listed below, as well as some personal information. Please follow the directions to create your username and password.     Your access code is: [email or web address]  Expires: 3/28/2017  1:15 PM     Your access code will  in 90 days. If you need help or a new code, please call your [place] clinic or [phone].        Care EveryWhere ID     This is your [facility] EveryWhere ID. This could be used by other organizations to access your [place] medical records  [id number]        Your Vitals Were     Pulse Temperature Respirations             65 98  F (36.7  C) (Oral) 18          Blood Pressure from Last 3 Encounters:   03/15/17 140/66   03/15/17 142/86   17 139/70    Weight from Last 3 Encounters:   03/15/17 88 kg (194 lb)   17 87.5 kg (193 lb)   17 87 kg (191 lb 12.8 oz)              We Performed the Following     CBC with platelets differential     Kappa and lambda light chain     Protein electrophoresis     Protein Immunofixation Serum          Today's Medication Changes          These changes are accurate as of: 3/15/17  1:11 PM.  If you have any questions, ask your nurse or doctor.               Start taking these medicines.        Dose/Directions    sodium polystyrene 15 GM/60ML suspension   Commonly known as:  KAYEXALATE   Used for:  Hyperkalemia, Multiple myeloma not having achieved remission (H)        Dose:  15 g   Take 60 mLs (15 g) by mouth once for 1 dose   Quantity:  60 mL   Refills:  0         These medicines have " changed or have updated prescriptions.        Dose/Directions    * dexamethasone 4 MG tablet   Commonly known as:  DECADRON   This may have changed:  Another medication with the same name was added. Make sure you understand how and when to take each.   Used for:  Multiple myeloma not having achieved remission (H)        Dose:  40 mg   Take 10 tablets (40 mg) by mouth every 7 days for 4 doses Take daily on Days 1, 8, 15, and 22. Cycles 3 and 4 ONLY.   Quantity:  40 tablet   Refills:  0       * dexamethasone 4 MG tablet   Commonly known as:  DECADRON   This may have changed:  You were already taking a medication with the same name, and this prescription was added. Make sure you understand how and when to take each.   Used for:  Multiple myeloma not having achieved remission (H)        Dose:  40 mg   Take 10 tablets (40 mg) by mouth every 7 days for 4 doses Take daily on Days 1, 8, 15, and 22. Cycles 3 and 4 ONLY.   Quantity:  40 tablet   Refills:  0       * Notice:  This list has 2 medication(s) that are the same as other medications prescribed for you. Read the directions carefully, and ask your doctor or other care provider to review them with you.         Where to get your medicines      These medications were sent to [id number] - [facility] [address] AT [facility] [address], [facility] [address]    Hours:  24-hours Phone:  [phone]     dexamethasone 4 MG tablet    sodium polystyrene 15 GM/60ML suspension                Primary Care Provider Office Phone # Fax #    Dickson Reich [phone]       [facility] [address]  [facility] [address]        Thank you!     Thank you for choosing [facility]  for your care. Our goal is always to provide you with excellent care. Hearing back from our patients is one way we can continue to improve our services. Please take a few minutes to complete the written  survey that you may receive in the mail after your visit with us. Thank you!             Your Updated Medication List - Protect others around you: Learn how to safely use, store and throw away your medicines at www.disposemymeds.org.          This list is accurate as of: 3/15/17  1:11 PM.  Always use your most recent med list.                   Brand Name Dispense Instructions for use    acyclovir 400 MG tablet    ZOVIRAX    60 tablet    Take 1 tablet (400 mg) by mouth 2 times daily Viral Prophylaxis.       amLODIPine 5 MG tablet    NORVASC    30 tablet    TAKE 1 TABLET BY MOUTH DAILY       * B-D U/F 31G X 8 MM   Generic drug:  insulin pen needle          * B-D U/F 31G X 8 MM   Generic drug:  insulin pen needle     100 each    USE TWICE DAILY OR AS DIRECTED       ciprofloxacin 250 MG tablet    CIPRO    20 tablet    Take 1 tablet two times daily for 10 days.       clotrimazole-betamethasone cream    LOTRISONE    30 g    Apply topically to the head of the penis two times a day for up to 2 weeks.       * dexamethasone 4 MG tablet    DECADRON    40 tablet    Take 10 tablets (40 mg) by mouth every 7 days for 4 doses Take daily on Days 1, 8, 15, and 22. Cycles 3 and 4 ONLY.       * dexamethasone 4 MG tablet    DECADRON    40 tablet    Take 10 tablets (40 mg) by mouth every 7 days for 4 doses Take daily on Days 1, 8, 15, and 22. Cycles 3 and 4 ONLY.       ferrous sulfate 325 (65 FE) MG tablet    IRON     Take 325 mg by mouth daily (with breakfast)       FREESTYLE LITE test strip   Generic drug:  blood glucose monitoring     200 each    USE TO TEST BLOOD GLUCOSE TWICE DAILY       gemfibrozil 600 MG tablet    LOPID    180 tablet    TAKE 1 TABLET BY MOUTH TWICE DAILY       glipiZIDE 10 MG tablet    GLUCOTROL    180 tablet    TAKE 1 TABLET BY MOUTH TWICE DAILY BEFORE A MEAL       insulin aspart 100 UNIT/ML injection    NovoLOG PEN    12 mL    Inject 1-5 Units Subcutaneous 4 times daily (with meals and nightly) Before meal  correction: 140-225 - take 1 unit  226-310 - take 2 units 311-395 - take 3 units 396-480 - take 4 units >481 - take 5 units Bedtime correction of: 200-299 give 1 unit 300-399 take 2 units 400 or greater take 3 units       levothyroxine 25 MCG tablet    SYNTHROID/LEVOTHROID    90 tablet    TAKE 1 TABLET BY MOUTH EVERY DAY.       lisinopril 30 MG tablet    PRINIVIL,ZESTRIL    90 tablet    TAKE 1 TABLET BY MOUTH EVERY DAY       metoprolol 25 MG tablet    LOPRESSOR    180 tablet    TAKE 1 TABLET BY MOUTH TWICE DAILY       omeprazole 20 MG CR capsule    priLOSEC    90 capsule    TAKE 1 CAPSULE BY MOUTH EVERY DAY       ondansetron 8 MG tablet    ZOFRAN    10 tablet    Take 1 tablet (8 mg) by mouth every 8 hours as needed (Nausea/Vomiting)       polyethylene glycol powder    MIRALAX/GLYCOLAX         prochlorperazine 5 MG tablet    COMPAZINE    30 tablet    Take 1 tablet (5 mg) by mouth every 6 hours as needed (Nausea/Vomiting)       Selenium 200 MCG Tabs      Take 100 mcg by mouth daily. Pt takes one half tab of the 200 mcg daily       sodium polystyrene 15 GM/60ML suspension    KAYEXALATE    60 mL    Take 60 mLs (15 g) by mouth once for 1 dose       tamsulosin 0.4 MG capsule    FLOMAX    90 capsule    TAKE 1 CAPSULE BY MOUTH DAILY       VITAMIN C PO      Take 1,000 mg by mouth daily       VITAMIN D3 PO      Take 1,000 Units by mouth daily       * Notice:  This list has 4 medication(s) that are the same as other medications prescribed for you. Read the directions carefully, and ask your doctor or other care provider to review them with you.

## 2017-03-15 NOTE — PATIENT INSTRUCTIONS
1.  Continue Velcade and dexamethasone. Velcade today.  2.  CBC, diff weekly- transfuse to keep hemoglobin of 8 and platelet of 10,000.   3.  RTC MD 4 weeks.   4.  Continue Zometa  5.  Labs today- SPEP, IFXN, light chains  6-  Please fax note to Dr. Salmeron [facility]

## 2017-03-16 LAB
ALBUMIN SERPL ELPH-MCNC: 3.6 G/DL (ref 3.7–5.1)
ALPHA1 GLOB SERPL ELPH-MCNC: 0.3 G/DL (ref 0.2–0.4)
ALPHA2 GLOB SERPL ELPH-MCNC: 0.8 G/DL (ref 0.5–0.9)
B-GLOBULIN SERPL ELPH-MCNC: 0.7 G/DL (ref 0.6–1)
GAMMA GLOB SERPL ELPH-MCNC: 3.9 G/DL (ref 0.7–1.6)
IGA SERPL-MCNC: 11 MG/DL (ref 70–380)
IGG SERPL-MCNC: 382 MG/DL (ref 695–1620)
IGM SERPL-MCNC: 6160 MG/DL (ref 60–265)
IMMUNOFIXATION ELP: ABNORMAL
KAPPA LC UR-MCNC: 215.75 MG/DL (ref 0.33–1.94)
KAPPA LC/LAMBDA SER: 342.46 {RATIO} (ref 0.26–1.65)
LAMBDA LC SERPL-MCNC: 0.63 MG/DL (ref 0.57–2.63)
M PROTEIN SERPL ELPH-MCNC: 3.4 G/DL
PROT PATTERN SERPL ELPH-IMP: ABNORMAL

## 2017-03-16 NOTE — TELEPHONE ENCOUNTER
FREESTYLE LITE BLOOD GLUCOSE STRIPS      Last Written Prescription Date: 3/15/2017  Last Fill Quantity: 200,  # refills: 3   Last Office Visit with [facility], [facility] or [facility] prescribing provider: 2/20/2017  Pt requesting 90 supply                                         Next 5 appointments (look out 90 days)     Mar 20, 2017  9:00 AM CDT   [facility]   [facility]    [address] [place] [address]   [phone]            Apr 12, 2017  9:30 AM CDT   Return Visit with Gretel Quinn MD   [facility] ([facility])    [facility]  [address]  [facility] [address]   [phone]

## 2017-03-17 RX ORDER — BLOOD-GLUCOSE METER
KIT MISCELLANEOUS
Qty: 300 STRIP | Refills: 3 | Status: SHIPPED | OUTPATIENT
Start: 2017-03-17 | End: 2017-01-01

## 2017-03-20 ENCOUNTER — OFFICE VISIT (OUTPATIENT)
Dept: FAMILY MEDICINE | Facility: CLINIC | Age: 82
End: 2017-03-20
Payer: COMMERCIAL

## 2017-03-20 VITALS
OXYGEN SATURATION: 98 % | RESPIRATION RATE: 16 BRPM | HEART RATE: 80 BPM | BODY MASS INDEX: 27.55 KG/M2 | WEIGHT: 192 LBS | TEMPERATURE: 98.1 F | DIASTOLIC BLOOD PRESSURE: 70 MMHG | SYSTOLIC BLOOD PRESSURE: 120 MMHG

## 2017-03-20 DIAGNOSIS — E11.22 TYPE 2 DIABETES MELLITUS WITH DIABETIC CHRONIC KIDNEY DISEASE, UNSPECIFIED CKD STAGE, UNSPECIFIED LONG TERM INSULIN USE STATUS: Primary | ICD-10-CM

## 2017-03-20 DIAGNOSIS — C90.00 MULTIPLE MYELOMA NOT HAVING ACHIEVED REMISSION (H): ICD-10-CM

## 2017-03-20 DIAGNOSIS — J06.9 UPPER RESPIRATORY TRACT INFECTION, UNSPECIFIED TYPE: ICD-10-CM

## 2017-03-20 PROCEDURE — 99214 OFFICE O/P EST MOD 30 MIN: CPT | Performed by: FAMILY MEDICINE

## 2017-03-20 NOTE — MR AVS SNAPSHOT
After Visit Summary   3/20/2017    Roc Parkinson    MRN: [id number]           Patient Information     Date Of Birth          7/7/1926        Visit Information        Provider Department      3/20/2017 9:00 AM Dickson Reich MD [facility]        Today's Diagnoses     Type 2 diabetes mellitus with diabetic chronic kidney disease, unspecified CKD stage, unspecified long term insulin use status (H)    -  1    Upper respiratory tract infection, unspecified type        Multiple myeloma not having achieved remission (H)          Care Instructions    I will leave insulin dosing as is at present. He sees the CDE in 2 weeks. Blood sugars are in the 200's mostly.    Will treat symptomatically and see back as needed if not improving.Will use saline gargles, tylenol.  Sucking on hard lozenges as needed. Push fluids. Salt water nasal spray as needed.    See back in one months time.        Follow-ups after your visit        Follow-up notes from your care team     Return in about 4 weeks (around 4/17/2017) for Routine Visit, Lab Work.      Your next 10 appointments already scheduled     Mar 21, 2017 10:00 AM CDT   Cystoscopy with Radha Jackson MD, [facility] [place] ([facility] [place])    [address]  [address]  [facility] [address]   [phone]            Mar 22, 2017  9:30 AM CDT   Level 5 with  INFUSION CHAIR 5   [facility] ([facility])    [facility]  [address]  [facility] [address]   [phone]            Mar 29, 2017  9:00 AM CDT   Level 5 with  INFUSION CHAIR 5   [facility] ([facility])    [facility]  [address]  [facility] [address]   [phone]            Apr 03, 2017 10:30 AM CDT   Diabetic Education with  DIABETIC ED  RESOURCE   [facility]    [address] [address]   [phone]            Apr 05, 2017  9:00 AM CDT   Level 5 with SH INFUSION CHAIR 4   [facility] ([facility])    [facility] [place]  [address]  [place] [address]   [phone]            Apr 12, 2017  9:00 AM CDT   Level 5 with SH INFUSION CHAIR 7   [facility] ([facility])    [facility] [place]  [address]  [place] [address]   [phone]            Apr 12, 2017  9:30 AM CDT   Return Visit with Gretel Quinn MD   [facility] ([facility])    [facility] [place]  [address]  [place] [address]   [phone]            Apr 19, 2017  9:00 AM CDT   Level 5 with SH INFUSION CHAIR 6   [facility] ([facility])    [facility]  [address]  [place] [address]   [phone]              Who to contact     If you have questions or need follow up information about today's clinic visit or your schedule please contact [facility] directly at [phone].  Normal or non-critical lab and imaging results will be communicated to you by [employer], letter or phone within 4 business days after the clinic has received the results. If you do not hear from us within 7 days, please contact the clinic through [facility] or phone. If you have a critical or abnormal lab result, we will notify you by phone as soon as possible.  Submit refill requests through Mobile [employer] or call your pharmacy and they will forward the refill request to us. Please allow 3 business days for your refill to be completed.          Additional Information About Your Visit        [facility] Information     Mobile [employer]hentication lets you  "send messages to your doctor, view your test results, renew your prescriptions, schedule appointments and more. To sign up, go to [email or web address] . Click on \"Log in\" on the left side of the screen, which will take you to the Welcome page. Then click on \"Sign up Now\" on the right side of the page.     You will be asked to enter the access code listed below, as well as some personal information. Please follow the directions to create your username and password.     Your access code is: [email or web address]  Expires: 3/28/2017  1:15 PM     Your access code will  in 90 days. If you need help or a new code, please call your [place] clinic or [phone].        Care EveryWhere ID     This is your [facility] EveryWhere ID. This could be used by other organizations to access your [place] medical records  [id number]        Your Vitals Were     Pulse Temperature Respirations Pulse Oximetry BMI (Body Mass Index)       80 98.1  F (36.7  C) (Tympanic) 16 98% 27.55 kg/m2        Blood Pressure from Last 3 Encounters:   17 120/70   03/15/17 140/66   03/15/17 140/67    Weight from Last 3 Encounters:   17 192 lb (87.1 kg)   03/15/17 194 lb (88 kg)   17 193 lb (87.5 kg)              Today, you had the following     No orders found for display       Primary Care Provider Office Phone # Fax #    Dickson Reich [phone]       [facility] [address] [facility] [address]        Thank you!     Thank you for choosing [facility]  for your care. Our goal is always to provide you with excellent care. Hearing back from our patients is one way we can continue to improve our services. Please take a few minutes to complete the written survey that you may receive in the mail after your visit with us. Thank you!             Your Updated Medication List - Protect others around you: Learn how to safely use, store and throw away your medicines at " www.disposemymeds.org.          This list is accurate as of: 3/20/17  9:20 AM.  Always use your most recent med list.                   Brand Name Dispense Instructions for use    acyclovir 400 MG tablet    ZOVIRAX    60 tablet    Take 1 tablet (400 mg) by mouth 2 times daily Viral Prophylaxis.       amLODIPine 5 MG tablet    NORVASC    30 tablet    TAKE 1 TABLET BY MOUTH DAILY       * B-D U/F 31G X 8 MM   Generic drug:  insulin pen needle          * B-D U/F 31G X 8 MM   Generic drug:  insulin pen needle     100 each    USE TWICE DAILY OR AS DIRECTED       ciprofloxacin 250 MG tablet    CIPRO    20 tablet    Take 1 tablet two times daily for 10 days.       clotrimazole-betamethasone cream    LOTRISONE    30 g    Apply topically to the head of the penis two times a day for up to 2 weeks.       dexamethasone 4 MG tablet    DECADRON    40 tablet    Take 10 tablets (40 mg) by mouth every 7 days for 4 doses Take daily on Days 1, 8, 15, and 22. Cycles 3 and 4 ONLY.       ferrous sulfate 325 (65 FE) MG tablet    IRON     Take 325 mg by mouth daily (with breakfast)       FREESTYLE LITE test strip   Generic drug:  blood glucose monitoring     300 strip    USE TO TEST FOUR TIMES DAILY       gemfibrozil 600 MG tablet    LOPID    180 tablet    TAKE 1 TABLET BY MOUTH TWICE DAILY       glipiZIDE 10 MG tablet    GLUCOTROL    180 tablet    TAKE 1 TABLET BY MOUTH TWICE DAILY BEFORE A MEAL       insulin aspart 100 UNIT/ML injection    NovoLOG PEN    12 mL    Inject 1-5 Units Subcutaneous 4 times daily (with meals and nightly) Before meal correction: 140-225 - take 1 unit  226-310 - take 2 units 311-395 - take 3 units 396-480 - take 4 units >481 - take 5 units Bedtime correction of: 200-299 give 1 unit 300-399 take 2 units 400 or greater take 3 units       levothyroxine 25 MCG tablet    SYNTHROID/LEVOTHROID    90 tablet    TAKE 1 TABLET BY MOUTH EVERY DAY.       lisinopril 30 MG tablet    PRINIVIL,ZESTRIL    90 tablet    TAKE 1 TABLET  BY MOUTH EVERY DAY       metoprolol 25 MG tablet    LOPRESSOR    180 tablet    TAKE 1 TABLET BY MOUTH TWICE DAILY       omeprazole 20 MG CR capsule    priLOSEC    90 capsule    TAKE 1 CAPSULE BY MOUTH EVERY DAY       ondansetron 8 MG tablet    ZOFRAN    10 tablet    Take 1 tablet (8 mg) by mouth every 8 hours as needed (Nausea/Vomiting)       polyethylene glycol powder    MIRALAX/GLYCOLAX         prochlorperazine 5 MG tablet    COMPAZINE    30 tablet    Take 1 tablet (5 mg) by mouth every 6 hours as needed (Nausea/Vomiting)       Selenium 200 MCG Tabs      Take 100 mcg by mouth daily. Pt takes one half tab of the 200 mcg daily       tamsulosin 0.4 MG capsule    FLOMAX    90 capsule    TAKE 1 CAPSULE BY MOUTH DAILY       VITAMIN C PO      Take 1,000 mg by mouth daily       VITAMIN D3 PO      Take 1,000 Units by mouth daily       * Notice:  This list has 2 medication(s) that are the same as other medications prescribed for you. Read the directions carefully, and ask your doctor or other care provider to review them with you.

## 2017-03-20 NOTE — PROGRESS NOTES
SUBJECTIVE:                                                    Roc Parkinson is a 89 year old male who presents to clinic today for the following health issues:      Diabetes Follow-up    Patient is checking blood sugars: 4 times daily.    Blood sugar testing frequency justification: Uncontrolled diabetes  Results are as follows:         Average around 225    Diabetic concerns: blood sugar frequently over 200     Symptoms of hypoglycemia (low blood sugar): none     Paresthesias (numbness or burning in feet) or sores: No     Date of last diabetic eye exam: UTD       Amount of exercise or physical activity: None    Problems taking medications regularly: No    Medication side effects: none    Diet: carbohydrate counting- TRIES      RESPIRATORY SYMPTOMS      Duration: saturday    Description  sore throat and cough    Severity: mild    Accompanying signs and symptoms: None    History (predisposing factors):  none    Precipitating or alleviating factors: None    Therapies tried and outcome:  none       Problem list and histories reviewed & adjusted, as indicated.  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)     Irritable bowel syndrome     Vitamin D deficiency disease     Microalbuminuria     Obesity     UTI (urinary tract infection) w hx of recurrence     Iron deficiency anemia     Nonsmoker     A-fib (H)     Health Care Home     Hyperlipidemia     CKD (chronic kidney disease) stage 3, GFR 30-59 ml/min     Hypothyroidism     Long-term (current) use of anticoagulants [Z79.01]     Macrocytic anemia     GIB (gastrointestinal bleeding)     Multiple myeloma not having achieved remission (H)      Hypercalcemia     Hyperglycemia     Urinary tract infection     Hyperkalemia     ACP (advance care planning)     Past Surgical History   Procedure Laterality Date     Colonoscopy  2007     Orthopedic surgery       lt knee in nov.2009     Arthroplasty knee  11/5/2012     Procedure: ARTHROPLASTY KNEE;  RIGHT TOTAL KNEE ARTHROPLASTY (SMITH & NEPHEW)^;  Surgeon: Dickson Schulte MD;  Location:  OR     Biopsy       bladder     Genitourinary surgery       TURP x2 and bladder scraping     Gi surgery       anal fistula     Soft tissue surgery       melanoma     Colonoscopy N/A 11/15/2016     Procedure: COMBINED COLONOSCOPY, SINGLE OR MULTIPLE BIOPSY/POLYPECTOMY BY BIOPSY;  Surgeon: Kenneth Fulton MD;  Location:  GI       Social History   Substance Use Topics     Smoking status: Never Smoker     Smokeless tobacco: Never Used     Alcohol use No     Family History   Problem Relation Age of Onset     Cardiovascular Father 81     heart arrythmia     Endocrine Disease Brother 74     Paget's           Reviewed and updated as needed this visit by clinical staff  Tobacco  Allergies  Meds  Med Hx  Surg Hx  Fam Hx  Soc Hx      Reviewed and updated as needed this visit by Provider         ROS:  Constitutional, HEENT, cardiovascular, pulmonary, gi and gu systems are negative, except as otherwise noted.    OBJECTIVE:                                                    /70  Pulse 80  Temp 98.1  F (36.7  C) (Tympanic)  Resp 16  Wt 192 lb (87.1 kg)  SpO2 98%  BMI 27.55 kg/m2  Body mass index is 27.55 kg/(m^2).  GENERAL APPEARANCE: healthy, alert and no distress  EYES: Eyes grossly normal to inspection, PERRL and conjunctivae and sclerae normal  HENT: ear canals and TM's normal and nose and mouth without ulcers or lesions  NECK: no adenopathy and no asymmetry, masses, or scars  RESP: lungs clear to auscultation - no rales, rhonchi or wheezes  CV: regular rates and rhythm, normal S1 S2, no S3 or S4 and no murmur, click  or rub  LYMPHATICS: normal ant/post cervical and supraclavicular nodes         ASSESSMENT/PLAN:                                                        ICD-10-CM    1. Type 2 diabetes mellitus with diabetic chronic kidney disease, unspecified CKD stage, unspecified long term insulin use status (H) E11.22    2. Upper respiratory tract infection, unspecified type J06.9    3. Multiple myeloma not having achieved remission (H) C90.00        Patient Instructions   I will leave insulin dosing as is at present. He sees the CDE in 2 weeks. Blood sugars are in the 200's mostly.    Will treat symptomatically and see back as needed if not improving.Will use saline gargles, tylenol.  Sucking on hard lozenges as needed. Push fluids. Salt water nasal spray as needed.    See back in one months time.      Dickson Reich MD  [facility]

## 2017-03-20 NOTE — NURSING NOTE
"Chief Complaint   Patient presents with     Diabetes       Initial /70  Pulse 80  Temp 98.1  F (36.7  C) (Tympanic)  Resp 16  Wt 192 lb (87.1 kg)  SpO2 98%  BMI 27.55 kg/m2 Estimated body mass index is 27.55 kg/(m^2) as calculated from the following:    Height as of 3/15/17: 5' 10\" (1.778 m).    Weight as of this encounter: 192 lb (87.1 kg).  Medication Reconciliation: complete     Mariia Allen CMA      "

## 2017-03-20 NOTE — PATIENT INSTRUCTIONS
I will leave insulin dosing as is at present. He sees the CDE in 2 weeks. Blood sugars are in the 200's mostly.    Will treat symptomatically and see back as needed if not improving.Will use saline gargles, tylenol.  Sucking on hard lozenges as needed. Push fluids. Salt water nasal spray as needed.    See back in one months time.

## 2017-03-22 ENCOUNTER — INFUSION THERAPY VISIT (OUTPATIENT)
Dept: INFUSION THERAPY | Facility: CLINIC | Age: 82
End: 2017-03-22
Attending: INTERNAL MEDICINE
Payer: MEDICARE

## 2017-03-22 ENCOUNTER — HOSPITAL ENCOUNTER (OUTPATIENT)
Facility: CLINIC | Age: 82
Setting detail: SPECIMEN
Discharge: HOME | End: 2017-03-22
Attending: INTERNAL MEDICINE | Admitting: INTERNAL MEDICINE
Payer: MEDICARE

## 2017-03-22 ENCOUNTER — TELEPHONE (OUTPATIENT)
Dept: FAMILY MEDICINE | Facility: CLINIC | Age: 82
End: 2017-03-22

## 2017-03-22 VITALS
WEIGHT: 190 LBS | RESPIRATION RATE: 16 BRPM | HEART RATE: 64 BPM | SYSTOLIC BLOOD PRESSURE: 149 MMHG | TEMPERATURE: 98 F | DIASTOLIC BLOOD PRESSURE: 75 MMHG | HEIGHT: 70 IN | BODY MASS INDEX: 27.2 KG/M2 | OXYGEN SATURATION: 97 %

## 2017-03-22 DIAGNOSIS — C90.00 MULTIPLE MYELOMA NOT HAVING ACHIEVED REMISSION (H): Primary | ICD-10-CM

## 2017-03-22 DIAGNOSIS — E83.52 HYPERCALCEMIA: ICD-10-CM

## 2017-03-22 DIAGNOSIS — D53.9 MACROCYTIC ANEMIA: ICD-10-CM

## 2017-03-22 LAB
ABO + RH BLD: NORMAL
ABO + RH BLD: NORMAL
ALBUMIN SERPL-MCNC: 2.5 G/DL (ref 3.4–5)
ALP SERPL-CCNC: 61 U/L (ref 40–150)
ALT SERPL W P-5'-P-CCNC: 13 U/L (ref 0–70)
ANION GAP SERPL CALCULATED.3IONS-SCNC: 9 MMOL/L (ref 3–14)
AST SERPL W P-5'-P-CCNC: 14 U/L (ref 0–45)
BASOPHILS # BLD AUTO: 0 10E9/L (ref 0–0.2)
BASOPHILS NFR BLD AUTO: 0 %
BILIRUB SERPL-MCNC: 0.4 MG/DL (ref 0.2–1.3)
BLD GP AB SCN SERPL QL: NORMAL
BLD PROD TYP BPU: NORMAL
BLD UNIT ID BPU: 0
BLD UNIT ID BPU: 0
BLOOD BANK CMNT PATIENT-IMP: NORMAL
BLOOD PRODUCT CODE: NORMAL
BLOOD PRODUCT CODE: NORMAL
BPU ID: NORMAL
BPU ID: NORMAL
BUN SERPL-MCNC: 40 MG/DL (ref 7–30)
CALCIUM SERPL-MCNC: 9.1 MG/DL (ref 8.5–10.1)
CHLORIDE SERPL-SCNC: 104 MMOL/L (ref 94–109)
CO2 SERPL-SCNC: 21 MMOL/L (ref 20–32)
CREAT SERPL-MCNC: 1.05 MG/DL (ref 0.66–1.25)
DIFFERENTIAL METHOD BLD: ABNORMAL
EOSINOPHIL # BLD AUTO: 0 10E9/L (ref 0–0.7)
EOSINOPHIL NFR BLD AUTO: 0.9 %
ERYTHROCYTE [DISTWIDTH] IN BLOOD BY AUTOMATED COUNT: 26.3 % (ref 10–15)
GFR SERPL CREATININE-BSD FRML MDRD: 66 ML/MIN/1.7M2
GLUCOSE SERPL-MCNC: 275 MG/DL (ref 70–99)
HCT VFR BLD AUTO: 21.2 % (ref 40–53)
HGB BLD-MCNC: 7.1 G/DL (ref 13.3–17.7)
IMM GRANULOCYTES # BLD: 0 10E9/L (ref 0–0.4)
IMM GRANULOCYTES NFR BLD: 0.6 %
LYMPHOCYTES # BLD AUTO: 0.5 10E9/L (ref 0.8–5.3)
LYMPHOCYTES NFR BLD AUTO: 14.4 %
MCH RBC QN AUTO: 33.3 PG (ref 26.5–33)
MCHC RBC AUTO-ENTMCNC: 33.5 G/DL (ref 31.5–36.5)
MCV RBC AUTO: 100 FL (ref 78–100)
MONOCYTES # BLD AUTO: 0.1 10E9/L (ref 0–1.3)
MONOCYTES NFR BLD AUTO: 2.7 %
NEUTROPHILS # BLD AUTO: 2.7 10E9/L (ref 1.6–8.3)
NEUTROPHILS NFR BLD AUTO: 81.4 %
NRBC # BLD AUTO: 0 10*3/UL
NRBC BLD AUTO-RTO: 0 /100
NUM BPU REQUESTED: 1
NUM BPU REQUESTED: 1
PLATELET # BLD AUTO: 16 10E9/L (ref 150–450)
POTASSIUM SERPL-SCNC: 4.9 MMOL/L (ref 3.4–5.3)
PROT SERPL-MCNC: 9.4 G/DL (ref 6.8–8.8)
RBC # BLD AUTO: 2.13 10E12/L (ref 4.4–5.9)
SODIUM SERPL-SCNC: 134 MMOL/L (ref 133–144)
SPECIMEN EXP DATE BLD: NORMAL
TRANSFUSION STATUS PATIENT QL: NORMAL
WBC # BLD AUTO: 3.3 10E9/L (ref 4–11)

## 2017-03-22 PROCEDURE — 96401 CHEMO ANTI-NEOPL SQ/IM: CPT

## 2017-03-22 PROCEDURE — P9037 PLATE PHERES LEUKOREDU IRRAD: HCPCS | Performed by: INTERNAL MEDICINE

## 2017-03-22 PROCEDURE — 80053 COMPREHEN METABOLIC PANEL: CPT | Performed by: INTERNAL MEDICINE

## 2017-03-22 PROCEDURE — 85025 COMPLETE CBC W/AUTO DIFF WBC: CPT | Performed by: INTERNAL MEDICINE

## 2017-03-22 PROCEDURE — 86923 COMPATIBILITY TEST ELECTRIC: CPT | Performed by: INTERNAL MEDICINE

## 2017-03-22 PROCEDURE — P9016 RBC LEUKOCYTES REDUCED: HCPCS | Performed by: INTERNAL MEDICINE

## 2017-03-22 PROCEDURE — 36430 TRANSFUSION BLD/BLD COMPNT: CPT

## 2017-03-22 PROCEDURE — 86901 BLOOD TYPING SEROLOGIC RH(D): CPT | Performed by: INTERNAL MEDICINE

## 2017-03-22 PROCEDURE — 96365 THER/PROPH/DIAG IV INF INIT: CPT

## 2017-03-22 PROCEDURE — 86850 RBC ANTIBODY SCREEN: CPT | Performed by: INTERNAL MEDICINE

## 2017-03-22 PROCEDURE — 86900 BLOOD TYPING SEROLOGIC ABO: CPT | Performed by: INTERNAL MEDICINE

## 2017-03-22 PROCEDURE — P9016 RBC LEUKOCYTES REDUCED: HCPCS | Mod: 59 | Performed by: INTERNAL MEDICINE

## 2017-03-22 PROCEDURE — 25000128 H RX IP 250 OP 636: Performed by: INTERNAL MEDICINE

## 2017-03-22 RX ADMIN — SODIUM CHLORIDE 250 ML: 9 INJECTION, SOLUTION INTRAVENOUS at 11:25

## 2017-03-22 RX ADMIN — BORTEZOMIB 2.1 MG: 3.5 INJECTION, POWDER, LYOPHILIZED, FOR SOLUTION INTRAVENOUS; SUBCUTANEOUS at 13:32

## 2017-03-22 RX ADMIN — ZOLEDRONIC ACID 3.5 MG: 0.8 INJECTION, SOLUTION, CONCENTRATE INTRAVENOUS at 11:25

## 2017-03-22 ASSESSMENT — PAIN SCALES - GENERAL: PAINLEVEL: NO PAIN (0)

## 2017-03-22 NOTE — MR AVS SNAPSHOT
After Visit Summary   3/22/2017    Roc Parkinson    MRN: [id number]           Patient Information     Date Of Birth          7/7/1926        Visit Information        Provider Department      3/22/2017 9:30 AM  INFUSION CHAIR 5 [facility]        Today's Diagnoses     Multiple myeloma not having achieved remission (H)    -  1    Hypercalcemia        Macrocytic anemia           Follow-ups after your visit        Your next 10 appointments already scheduled     Mar 29, 2017  9:00 AM CDT   Level 5 with  INFUSION CHAIR 5   [facility] ([facility])    [facility]  [address]  [place] [address]   [phone]            Apr 03, 2017 10:30 AM CDT   Diabetic Education with  DIABETIC ED RESOURCE   [facility]    [address] [place] [address]   [phone]            Apr 05, 2017  9:00 AM CDT   Level 5 with  INFUSION CHAIR 4   [facility] ([facility])    [facility]  [address]  [place] [address]   [phone]            Apr 12, 2017  9:00 AM CDT   Level 5 with  INFUSION CHAIR 7   [facility] ([facility])    [facility] [place]  [address]  [place] [address]   [phone]            Apr 12, 2017  9:30 AM CDT   Return Visit with Gretel Quinn MD   [facility] ([facility])    [facility]  [address]  [place] [address]   [phone]            Apr 17, 2017 10:30 AM CDT   SHORT with Dickson Reich MD   [facility]    [address] [place] [address]   [phone]            Apr  "19, 2017  9:00 AM CDT   Level 5 with  INFUSION CHAIR 6   [facility] ([facility])    [facility] [place]  [address]  [place] [address]   [phone]              Future tests that were ordered for you today     Open Standing Orders        Priority Remaining Interval Expires Ordered    Red blood cell prepare order unit Routine 99/100 CONDITIONAL (SPECIFY) BLOOD  3/22/2017    Transfuse red blood cell unit Routine 99/100 TRANSFUSE 1 UNIT  3/22/2017    Platelets prepare order unit Routine 99/100 CONDITIONAL (SPECIFY) BLOOD  3/22/2017    Transfuse platelets unit Routine 99/100 TRANSFUSE 1 DOSE  3/22/2017            Who to contact     If you have questions or need follow up information about today's clinic visit or your schedule please contact [facility] directly at [phone].  Normal or non-critical lab and imaging results will be communicated to you by [employer]Drummer[employer], letter or phone within 4 business days after the clinic has received the results. If you do not hear from us within 7 days, please contact the clinic through [employer]rum[employer] or phone. If you have a critical or abnormal lab result, we will notify you by phone as soon as possible.  Submit refill requests through [employer] or call your pharmacy and they will forward the refill request to us. Please allow 3 business days for your refill to be completed.          Additional Information About Your Visit        [employer] Information     [employer] lets you send messages to your doctor, view your test results, renew your prescriptions, schedule appointments and more. To sign up, go to [email or web address] . Click on \"Log in\" on the left side of the screen, which will take you to the Welcome page. Then click on \"Sign up Now\" on the right side of the page.     You will be asked to enter the access code listed below, as well as some personal information. Please follow " "the directions to create your username and password.     Your access code is: [email or web address]  Expires: 3/28/2017  1:15 PM     Your access code will  in 90 days. If you need help or a new code, please call your [facility] or [phone].        Care EveryWhere ID     This is your Care EveryWhere ID. This could be used by other organizations to access your [place] medical records  [id number]        Your Vitals Were     Pulse Temperature Respirations Height Pulse Oximetry BMI (Body Mass Index)    64 98  F (36.7  C) (Oral) 16 1.778 m (5' 10\") 97% 27.26 kg/m2       Blood Pressure from Last 3 Encounters:   17 149/75   17 120/70   03/15/17 140/66    Weight from Last 3 Encounters:   17 86.2 kg (190 lb)   17 87.1 kg (192 lb)   03/15/17 88 kg (194 lb)              We Performed the Following     ABO/Rh type and screen     Blood component     Blood component     CBC with platelets differential     Comprehensive metabolic panel     Nursing Communication 1     Nursing Communication 1     Obtain affirmation of informed consent     Platelets prepare order unit     Red blood cell prepare order unit     Transfuse platelets unit     Transfuse red blood cell unit     Treatment Conditions        Primary Care Provider Office Phone # Fax #    Dickson Reich [phone]       [facility] [address] [facility] [address]        Thank you!     Thank you for choosing [facility]  for your care. Our goal is always to provide you with excellent care. Hearing back from our patients is one way we can continue to improve our services. Please take a few minutes to complete the written survey that you may receive in the mail after your visit with us. Thank you!             Your Updated Medication List - Protect others around you: Learn how to safely use, store and throw away your medicines at www.disposemymeds.org.          This list is " accurate as of: 3/22/17  3:13 PM.  Always use your most recent med list.                   Brand Name Dispense Instructions for use    acyclovir 400 MG tablet    ZOVIRAX    60 tablet    Take 1 tablet (400 mg) by mouth 2 times daily Viral Prophylaxis.       amLODIPine 5 MG tablet    NORVASC    30 tablet    TAKE 1 TABLET BY MOUTH DAILY       * B-D U/F 31G X 8 MM   Generic drug:  insulin pen needle          * B-D U/F 31G X 8 MM   Generic drug:  insulin pen needle     100 each    USE TWICE DAILY OR AS DIRECTED       clotrimazole-betamethasone cream    LOTRISONE    30 g    Apply topically to the head of the penis two times a day for up to 2 weeks.       dexamethasone 4 MG tablet    DECADRON    40 tablet    Take 10 tablets (40 mg) by mouth every 7 days for 4 doses Take daily on Days 1, 8, 15, and 22. Cycles 3 and 4 ONLY.       ferrous sulfate 325 (65 FE) MG tablet    IRON     Take 325 mg by mouth daily (with breakfast)       FREESTYLE LITE test strip   Generic drug:  blood glucose monitoring     300 strip    USE TO TEST FOUR TIMES DAILY       gemfibrozil 600 MG tablet    LOPID    180 tablet    TAKE 1 TABLET BY MOUTH TWICE DAILY       glipiZIDE 10 MG tablet    GLUCOTROL    180 tablet    TAKE 1 TABLET BY MOUTH TWICE DAILY BEFORE A MEAL       insulin aspart 100 UNIT/ML injection    NovoLOG PEN    12 mL    Inject 1-5 Units Subcutaneous 4 times daily (with meals and nightly) Before meal correction: 140-225 - take 1 unit  226-310 - take 2 units 311-395 - take 3 units 396-480 - take 4 units >481 - take 5 units Bedtime correction of: 200-299 give 1 unit 300-399 take 2 units 400 or greater take 3 units       levothyroxine 25 MCG tablet    SYNTHROID/LEVOTHROID    90 tablet    TAKE 1 TABLET BY MOUTH EVERY DAY.       lisinopril 30 MG tablet    PRINIVIL,ZESTRIL    90 tablet    TAKE 1 TABLET BY MOUTH EVERY DAY       metoprolol 25 MG tablet    LOPRESSOR    180 tablet    TAKE 1 TABLET BY MOUTH TWICE DAILY       omeprazole 20 MG CR  capsule    priLOSEC    90 capsule    TAKE 1 CAPSULE BY MOUTH EVERY DAY       ondansetron 8 MG tablet    ZOFRAN    10 tablet    Take 1 tablet (8 mg) by mouth every 8 hours as needed (Nausea/Vomiting)       polyethylene glycol powder    MIRALAX/GLYCOLAX         prochlorperazine 5 MG tablet    COMPAZINE    30 tablet    Take 1 tablet (5 mg) by mouth every 6 hours as needed (Nausea/Vomiting)       Selenium 200 MCG Tabs      Take 100 mcg by mouth daily. Pt takes one half tab of the 200 mcg daily       tamsulosin 0.4 MG capsule    FLOMAX    90 capsule    TAKE 1 CAPSULE BY MOUTH DAILY       VITAMIN C PO      Take 1,000 mg by mouth daily       VITAMIN D3 PO      Take 1,000 Units by mouth daily       * Notice:  This list has 2 medication(s) that are the same as other medications prescribed for you. Read the directions carefully, and ask your doctor or other care provider to review them with you.

## 2017-03-22 NOTE — TELEPHONE ENCOUNTER
Reason for Call:  Form, our goal is to have forms completed with 72 hours, however, some forms may require a visit or additional information.    Type of letter, form or note:  medical    Who is the form from?: [employer]    Where did the form come from: form was faxed in    What clinic location was the form placed at?: [facility]    Where the form was placed: Dr's Box: Dickson Reich MD    What number is listed as a contact on the form?: Fax [phone]       Additional comments: Diabetic Form (3/17/17)    Call taken on 3/22/2017 at 10:17 AM by Abdias Santoyo

## 2017-03-22 NOTE — TELEPHONE ENCOUNTER
Form reviewed, completed, and signed by Dr. Dickson Reich and faxed to [facility].  [facility]  Rosalva Malloy TC

## 2017-03-22 NOTE — PROGRESS NOTES
Infusion Nursing Note:  Roc Parkinson presents today for C4D8 Velcade, Zometa, platelet and PRBC transfusions  Patient seen by provider today: No   present during visit today: Not Applicable.    Note: No new concerns today other than cold for past week. Denies fevers or any signs of bleeding. Verified with patient that he is taking calcium and vitamin D supplements as ordered.    1115: Reviewed labs results with Dr. Quinn, orders as follows:    Ok to proceed with velcade today  Give 1 dose of platelets in addition to 1 unit PRBC today    Intravenous Access:  Labs drawn without difficulty.  Peripheral IV placed.    Treatment Conditions:  Lab Results   Component Value Date    HGB 7.1 03/22/2017     Lab Results   Component Value Date    WBC 3.3 03/22/2017      Lab Results   Component Value Date    ANEU 2.7 03/22/2017     Lab Results   Component Value Date    PLT 16 03/22/2017      Lab Results   Component Value Date     03/22/2017                   Lab Results   Component Value Date    POTASSIUM 4.9 03/22/2017           No results found for: MAG         Lab Results   Component Value Date    CR 1.05 03/22/2017                   Lab Results   Component Value Date    MICHELLE  Corrected calcium 9.1  10.3 03/22/2017 03/22/2017                Lab Results   Component Value Date    BILITOTAL 0.4 03/22/2017           Lab Results   Component Value Date    ALBUMIN 2.5 03/22/2017                    Lab Results   Component Value Date    ALT 13 03/22/2017           Lab Results   Component Value Date    AST 14 03/22/2017     Results reviewed, labs MET treatment parameters for zometa and 1 unit PRBC, ok to proceed with treatment. Per Dr. Quinn, also give 1 dose of platelets today.  Results reviewed, labs did NOT meet treatment parameters for velcade, ok to proceed--refer to above note.  Blood transfusion consent signed 12/21/16.      Post Infusion Assessment:  Patient tolerated infusion without incident.  Patient  tolerated injection without incident. Velcade given SQ in right mid abdomen.  Blood return noted pre and post infusion.  Site patent and intact, free from redness, edema or discomfort.  No evidence of extravasations.  Access discontinued per protocol.    Discharge Plan:  Discharge instructions reviewed with: Patient.  Patient and/or family verbalized understanding of discharge instructions and all questions answered.  Copy of AVS reviewed with patient and/or family.  Patient will return 3/29 for next appointment.  Patient discharged in stable condition accompanied by: self.  Departure Mode: Ambulatory.    Eliana Carpenter RN

## 2017-03-29 ENCOUNTER — INFUSION THERAPY VISIT (OUTPATIENT)
Dept: INFUSION THERAPY | Facility: CLINIC | Age: 82
End: 2017-03-29
Attending: INTERNAL MEDICINE
Payer: MEDICARE

## 2017-03-29 ENCOUNTER — HOSPITAL ENCOUNTER (OUTPATIENT)
Facility: CLINIC | Age: 82
Setting detail: SPECIMEN
Discharge: HOME | End: 2017-03-29
Attending: INTERNAL MEDICINE | Admitting: INTERNAL MEDICINE
Payer: MEDICARE

## 2017-03-29 VITALS
WEIGHT: 190.7 LBS | BODY MASS INDEX: 27.3 KG/M2 | SYSTOLIC BLOOD PRESSURE: 155 MMHG | DIASTOLIC BLOOD PRESSURE: 70 MMHG | RESPIRATION RATE: 16 BRPM | TEMPERATURE: 97.8 F | HEIGHT: 70 IN | HEART RATE: 62 BPM

## 2017-03-29 DIAGNOSIS — C90.00 MULTIPLE MYELOMA NOT HAVING ACHIEVED REMISSION (H): Primary | ICD-10-CM

## 2017-03-29 DIAGNOSIS — D53.9 MACROCYTIC ANEMIA: ICD-10-CM

## 2017-03-29 LAB
ABO + RH BLD: NORMAL
ABO + RH BLD: NORMAL
ALBUMIN SERPL-MCNC: 2.7 G/DL (ref 3.4–5)
ALP SERPL-CCNC: 68 U/L (ref 40–150)
ALT SERPL W P-5'-P-CCNC: 17 U/L (ref 0–70)
ANION GAP SERPL CALCULATED.3IONS-SCNC: 10 MMOL/L (ref 3–14)
AST SERPL W P-5'-P-CCNC: 10 U/L (ref 0–45)
BASOPHILS # BLD AUTO: 0 10E9/L (ref 0–0.2)
BASOPHILS NFR BLD AUTO: 0 %
BILIRUB SERPL-MCNC: 0.5 MG/DL (ref 0.2–1.3)
BLD GP AB SCN SERPL QL: NORMAL
BLD PROD TYP BPU: NORMAL
BLD PROD TYP BPU: NORMAL
BLD UNIT ID BPU: 0
BLOOD BANK CMNT PATIENT-IMP: NORMAL
BLOOD PRODUCT CODE: NORMAL
BPU ID: NORMAL
BUN SERPL-MCNC: 37 MG/DL (ref 7–30)
CALCIUM SERPL-MCNC: 9.2 MG/DL (ref 8.5–10.1)
CHLORIDE SERPL-SCNC: 104 MMOL/L (ref 94–109)
CO2 SERPL-SCNC: 20 MMOL/L (ref 20–32)
CREAT SERPL-MCNC: 1.06 MG/DL (ref 0.66–1.25)
DIFFERENTIAL METHOD BLD: ABNORMAL
EOSINOPHIL # BLD AUTO: 0 10E9/L (ref 0–0.7)
EOSINOPHIL NFR BLD AUTO: 1 %
ERYTHROCYTE [DISTWIDTH] IN BLOOD BY AUTOMATED COUNT: 25.2 % (ref 10–15)
GFR SERPL CREATININE-BSD FRML MDRD: 66 ML/MIN/1.7M2
GLUCOSE SERPL-MCNC: 293 MG/DL (ref 70–99)
HCT VFR BLD AUTO: 23.4 % (ref 40–53)
HGB BLD-MCNC: 8 G/DL (ref 13.3–17.7)
IMM GRANULOCYTES # BLD: 0 10E9/L (ref 0–0.4)
IMM GRANULOCYTES NFR BLD: 0.7 %
LYMPHOCYTES # BLD AUTO: 0.6 10E9/L (ref 0.8–5.3)
LYMPHOCYTES NFR BLD AUTO: 18.9 %
MCH RBC QN AUTO: 33.9 PG (ref 26.5–33)
MCHC RBC AUTO-ENTMCNC: 34.2 G/DL (ref 31.5–36.5)
MCV RBC AUTO: 99 FL (ref 78–100)
MONOCYTES # BLD AUTO: 0.1 10E9/L (ref 0–1.3)
MONOCYTES NFR BLD AUTO: 4 %
NEUTROPHILS # BLD AUTO: 2.2 10E9/L (ref 1.6–8.3)
NEUTROPHILS NFR BLD AUTO: 75.4 %
NRBC # BLD AUTO: 0 10*3/UL
NRBC BLD AUTO-RTO: 0 /100
NUM BPU REQUESTED: 1
PLATELET # BLD AUTO: 36 10E9/L (ref 150–450)
POTASSIUM SERPL-SCNC: 5.2 MMOL/L (ref 3.4–5.3)
PROT SERPL-MCNC: 9.7 G/DL (ref 6.8–8.8)
RBC # BLD AUTO: 2.36 10E12/L (ref 4.4–5.9)
SODIUM SERPL-SCNC: 134 MMOL/L (ref 133–144)
SPECIMEN EXP DATE BLD: NORMAL
TRANSFUSION STATUS PATIENT QL: NORMAL
TRANSFUSION STATUS PATIENT QL: NORMAL
WBC # BLD AUTO: 3 10E9/L (ref 4–11)

## 2017-03-29 PROCEDURE — P9016 RBC LEUKOCYTES REDUCED: HCPCS | Performed by: INTERNAL MEDICINE

## 2017-03-29 PROCEDURE — 80053 COMPREHEN METABOLIC PANEL: CPT | Performed by: INTERNAL MEDICINE

## 2017-03-29 PROCEDURE — 96401 CHEMO ANTI-NEOPL SQ/IM: CPT

## 2017-03-29 PROCEDURE — 86900 BLOOD TYPING SEROLOGIC ABO: CPT | Performed by: INTERNAL MEDICINE

## 2017-03-29 PROCEDURE — 85025 COMPLETE CBC W/AUTO DIFF WBC: CPT | Performed by: INTERNAL MEDICINE

## 2017-03-29 PROCEDURE — 86850 RBC ANTIBODY SCREEN: CPT | Performed by: INTERNAL MEDICINE

## 2017-03-29 PROCEDURE — 36430 TRANSFUSION BLD/BLD COMPNT: CPT

## 2017-03-29 PROCEDURE — 86901 BLOOD TYPING SEROLOGIC RH(D): CPT | Performed by: INTERNAL MEDICINE

## 2017-03-29 PROCEDURE — 86923 COMPATIBILITY TEST ELECTRIC: CPT | Performed by: INTERNAL MEDICINE

## 2017-03-29 PROCEDURE — 25000128 H RX IP 250 OP 636: Performed by: INTERNAL MEDICINE

## 2017-03-29 RX ADMIN — BORTEZOMIB 2.1 MG: 3.5 INJECTION, POWDER, LYOPHILIZED, FOR SOLUTION INTRAVENOUS; SUBCUTANEOUS at 10:28

## 2017-03-29 ASSESSMENT — PAIN SCALES - GENERAL: PAINLEVEL: NO PAIN (0)

## 2017-03-29 NOTE — MR AVS SNAPSHOT
After Visit Summary   3/29/2017    Roc Parkinson    MRN: [id number]           Patient Information     Date Of Birth          7/7/1926        Visit Information        Provider Department      3/29/2017 9:00 AM  INFUSION CHAIR 5 [facility]        Today's Diagnoses     Multiple myeloma not having achieved remission (H)    -  1    Macrocytic anemia           Follow-ups after your visit        Your next 10 appointments already scheduled     Apr 03, 2017 10:30 AM CDT   Diabetic Education with  DIABETIC ED RESOURCE   [facility]    [address] [address]   [phone]            Apr 05, 2017  9:00 AM CDT   Level 5 with  INFUSION CHAIR 4   [facility] ([facility])    [facility]  [address]  [place] [address]   [phone]            Apr 12, 2017  9:00 AM CDT   Level 5 with  INFUSION CHAIR 7   [facility] ([facility])    [facility]  [address]  [place] [address]   [phone]            Apr 12, 2017  9:30 AM CDT   Return Visit with Gretel Quinn MD   [facility] ([facility])    [facility]  [address]  [place] [address]   [phone]            Apr 17, 2017 10:30 AM CDT   SHORT with Dickson Reich MD   [facility]    [address] [place] [address]   [phone]            Apr 19, 2017  9:00 AM CDT   Level 5 with  INFUSION CHAIR 6   [facility] ([facility])    [facility]  [address]  [place] [address]   [phone]              Future tests that were  "ordered for you today     Open Standing Orders        Priority Remaining Interval Expires Ordered    Red blood cell prepare order unit Routine 99/100 CONDITIONAL (SPECIFY) BLOOD  3/29/2017    Transfuse red blood cell unit Routine 99/100 TRANSFUSE 1 UNIT  3/29/2017            Who to contact     If you have questions or need follow up information about today's clinic visit or your schedule please contact [facility] directly at [phone].  Normal or non-critical lab and imaging results will be communicated to you by [employer], letter or phone within 4 business days after the clinic has received the results. If you do not hear from us within 7 days, please contact the clinic through [employer] or phone. If you have a critical or abnormal lab result, we will notify you by phone as soon as possible.  Submit refill requests through [employer] or call your pharmacy and they will forward the refill request to us. Please allow 3 business days for your refill to be completed.          Additional Information About Your Visit        [employer] Information     [employer] lets you send messages to your doctor, view your test results, renew your prescriptions, schedule appointments and more. To sign up, go to [email or web address] . Click on \"Log in\" on the left side of the screen, which will take you to the Welcome page. Then click on \"Sign up Now\" on the right side of the page.     You will be asked to enter the access code listed below, as well as some personal information. Please follow the directions to create your username and password.     Your access code is: [email or web address]  Expires: 2017 12:48 PM     Your access code will  in 90 days. If you need help or a new code, please call your [place] clinic or [phone].        Care EveryWhere ID     This is your Care EveryWhere ID. This could be used by other organizations to access your [place] medical records  [id number]        Your Vitals " "Were     Pulse Temperature Respirations Height BMI (Body Mass Index)       62 97.8  F (36.6  C) (Oral) 16 1.778 m (5' 10\") 27.36 kg/m2        Blood Pressure from Last 3 Encounters:   03/29/17 155/70   03/22/17 149/75   03/20/17 120/70    Weight from Last 3 Encounters:   03/29/17 86.5 kg (190 lb 11.2 oz)   03/22/17 86.2 kg (190 lb)   03/20/17 87.1 kg (192 lb)              We Performed the Following     ABO/Rh type and screen     Blood component     CBC with platelets differential     Comprehensive metabolic panel     Obtain affirmation of informed consent     Red blood cell prepare order unit     Transfuse red blood cell unit     Treatment Conditions        Primary Care Provider Office Phone # Fax #    Dickson Reich [phone]       [facility] [address]  [facility] [address]        Thank you!     Thank you for choosing [facility]  for your care. Our goal is always to provide you with excellent care. Hearing back from our patients is one way we can continue to improve our services. Please take a few minutes to complete the written survey that you may receive in the mail after your visit with us. Thank you!             Your Updated Medication List - Protect others around you: Learn how to safely use, store and throw away your medicines at www.disposemymeds.org.          This list is accurate as of: 3/29/17 12:48 PM.  Always use your most recent med list.                   Brand Name Dispense Instructions for use    acyclovir 400 MG tablet    ZOVIRAX    60 tablet    Take 1 tablet (400 mg) by mouth 2 times daily Viral Prophylaxis.       amLODIPine 5 MG tablet    NORVASC    30 tablet    TAKE 1 TABLET BY MOUTH DAILY       * B-D U/F 31G X 8 MM   Generic drug:  insulin pen needle          * B-D U/F 31G X 8 MM   Generic drug:  insulin pen needle     100 each    USE TWICE DAILY OR AS DIRECTED       clotrimazole-betamethasone cream    LOTRISONE    " 30 g    Apply topically to the head of the penis two times a day for up to 2 weeks.       dexamethasone 4 MG tablet    DECADRON    40 tablet    Take 10 tablets (40 mg) by mouth every 7 days for 4 doses Take daily on Days 1, 8, 15, and 22. Cycles 3 and 4 ONLY.       ferrous sulfate 325 (65 FE) MG tablet    IRON     Take 325 mg by mouth daily (with breakfast)       FREESTYLE LITE test strip   Generic drug:  blood glucose monitoring     300 strip    USE TO TEST FOUR TIMES DAILY       gemfibrozil 600 MG tablet    LOPID    180 tablet    TAKE 1 TABLET BY MOUTH TWICE DAILY       glipiZIDE 10 MG tablet    GLUCOTROL    180 tablet    TAKE 1 TABLET BY MOUTH TWICE DAILY BEFORE A MEAL       insulin aspart 100 UNIT/ML injection    NovoLOG PEN    12 mL    Inject 1-5 Units Subcutaneous 4 times daily (with meals and nightly) Before meal correction: 140-225 - take 1 unit  226-310 - take 2 units 311-395 - take 3 units 396-480 - take 4 units >481 - take 5 units Bedtime correction of: 200-299 give 1 unit 300-399 take 2 units 400 or greater take 3 units       levothyroxine 25 MCG tablet    SYNTHROID/LEVOTHROID    90 tablet    TAKE 1 TABLET BY MOUTH EVERY DAY.       lisinopril 30 MG tablet    PRINIVIL,ZESTRIL    90 tablet    TAKE 1 TABLET BY MOUTH EVERY DAY       metoprolol 25 MG tablet    LOPRESSOR    180 tablet    TAKE 1 TABLET BY MOUTH TWICE DAILY       omeprazole 20 MG CR capsule    priLOSEC    90 capsule    TAKE 1 CAPSULE BY MOUTH EVERY DAY       ondansetron 8 MG tablet    ZOFRAN    10 tablet    Take 1 tablet (8 mg) by mouth every 8 hours as needed (Nausea/Vomiting)       polyethylene glycol powder    MIRALAX/GLYCOLAX         prochlorperazine 5 MG tablet    COMPAZINE    30 tablet    Take 1 tablet (5 mg) by mouth every 6 hours as needed (Nausea/Vomiting)       Selenium 200 MCG Tabs      Take 100 mcg by mouth daily. Pt takes one half tab of the 200 mcg daily       tamsulosin 0.4 MG capsule    FLOMAX    90 capsule    TAKE 1 CAPSULE BY  MOUTH DAILY       VITAMIN C PO      Take 1,000 mg by mouth daily       VITAMIN D3 PO      Take 1,000 Units by mouth daily       * Notice:  This list has 2 medication(s) that are the same as other medications prescribed for you. Read the directions carefully, and ask your doctor or other care provider to review them with you.

## 2017-03-29 NOTE — PROGRESS NOTES
Infusion Nursing Note:  Roc SILVA Parkinson presents today for chemo and possible transfusion     Patient seen by provider today: No   present during visit today:   Not Applicable.  Note: HBG = 8.0 Dr Quinn wants pt to have 1 unit of packed cells. OK to give velcade with Platelets of 36,000.    Intravenous Access:  Labs drawn without difficulty.  Peripheral IV placed.    Treatment Conditions:  Results reviewed, labs MET treatment parameters, ok to proceed with treatment.  Blood transfusion consent signed 12-.      Post Infusion Assessment:  Patient tolerated infusion without incident.  Patient tolerated injection without incident.  Blood return noted pre and post infusion.  Site patent and intact, free from redness, edema or discomfort.  No evidence of extravasations.  Access discontinued per protocol.    Discharge Plan:   Discharge instructions reviewed with: Patient.  Patient and/or family verbalized understanding of discharge instructions and all questions answered.  Copy of AVS reviewed with patient and/or family.  Patient will return 4/5/2017 for next appointment.  Patient discharged in stable condition accompanied by: self.  Departure Mode: Ambulatory.    Dora Mcgrath RN

## 2017-04-03 ENCOUNTER — ALLIED HEALTH/NURSE VISIT (OUTPATIENT)
Dept: EDUCATION SERVICES | Facility: CLINIC | Age: 82
End: 2017-04-03
Payer: COMMERCIAL

## 2017-04-03 DIAGNOSIS — N18.30 TYPE 2 DIABETES MELLITUS WITH STAGE 3 CHRONIC KIDNEY DISEASE, WITH LONG-TERM CURRENT USE OF INSULIN (H): ICD-10-CM

## 2017-04-03 DIAGNOSIS — E11.22 TYPE 2 DIABETES MELLITUS WITH STAGE 3 CHRONIC KIDNEY DISEASE, WITH LONG-TERM CURRENT USE OF INSULIN (H): ICD-10-CM

## 2017-04-03 DIAGNOSIS — E11.22 TYPE 2 DIABETES MELLITUS WITH DIABETIC CHRONIC KIDNEY DISEASE, UNSPECIFIED CKD STAGE, UNSPECIFIED LONG TERM INSULIN USE STATUS: Primary | ICD-10-CM

## 2017-04-03 DIAGNOSIS — Z79.4 TYPE 2 DIABETES MELLITUS WITH STAGE 3 CHRONIC KIDNEY DISEASE, WITH LONG-TERM CURRENT USE OF INSULIN (H): ICD-10-CM

## 2017-04-03 PROCEDURE — G0108 DIAB MANAGE TRN  PER INDIV: HCPCS

## 2017-04-03 NOTE — PROGRESS NOTES
Diabetes Self Management Training: Follow-up Visit    Roc Parkinson presents today for evaluation of glucose control related to Type 2 diabetes.    He is accompanied by self    Patient's diabetes management related comments/concerns: higher blood sugars lately    Patient would like this visit to be focused around the following diabetes-related behaviors and goals: Taking Medication    ASSESSMENT:  Patient Problem List reviewed for relevant medical history and current medical status.    Current Diabetes Management per Patient:  Taking diabetes medications?   yes:     Diabetes Medication(s)     Insulin Sig    insulin aspart (NOVOLOG PEN) 100 UNIT/ML injection Inject 1-5 Units Subcutaneous 4 times daily (with meals and nightly) Before meal correction:   140-225 - take 1 unit    226-310 - take 2 units   311-395 - take 3 units   396-480 - take 4 units   >481 - take 5 units  Bedtime correction of:   200-299 give 1 unit   300-399 take 2 units   400 or greater take 3 units    Sulfonylureas Sig    glipiZIDE (GLUCOTROL) 10 MG tablet TAKE 1 TABLET BY MOUTH TWICE DAILY BEFORE A MEAL      ** Steroid dose was doubled since last visit.     Patient glucose self monitoring as follows: four times daily.   BG results:   Date Breakfast  Lunch  Dinner  Bedtime    Before After Before After Before After    4/3 158         4/2 128      269   4/1 155    329  277   3/31 91  189  264  458   3/30 340  390  323  245   3/29 112    hi  hi   3/28 135  301  269  227        BG values are: Not in goal -- in the past 2 weeks pt with 76% of morning numbers that are high.     Patient's most recent   Lab Results   Component Value Date    A1C 8.0 11/14/2016    is not meeting goal of <8.0    Nutrition:  Patient eats 3 meals per day    Breakfast - papaya juice (8 oz) with egg and toast OR cream of wheat OR cheerios  Lunch - papaya juice (8 oz) with soup OR half sandwich  Dinner - papaya juice (8 oz) with meat and vegetable  Snacks - glass of milk  "between meals sometimes or tomato juice  No HS snack lately    Beverages: papaya juice, milk, tomato juice, water    Cultural/Hoahaoism diet restrictions: No     Biggest Challenge to Healthy Eating: none    Physical Activity:    Not assessed    Diabetes Complications:  Acute Complications: At risk for hypoglycemia? yes  Symptoms of low blood sugar? Has not had a low yet  Frequency of hypoglycemia: none    Vitals:  There were no vitals taken for this visit.  Estimated body mass index is 27.36 kg/(m^2) as calculated from the following:    Height as of 3/29/17: 1.778 m (5' 10\").    Weight as of 3/29/17: 86.5 kg (190 lb 11.2 oz).   Last 3 BP:   BP Readings from Last 3 Encounters:   03/29/17 155/70   03/22/17 149/75   03/20/17 120/70       History   Smoking Status     Never Smoker   Smokeless Tobacco     Never Used       Labs:  Lab Results   Component Value Date    A1C 8.0 11/14/2016     Lab Results   Component Value Date     03/29/2017     Lab Results   Component Value Date    LDL 61 05/09/2016     HDL Cholesterol   Date Value Ref Range Status   05/09/2016 25 (L) >39 mg/dL Final   ]  GFR Estimate   Date Value Ref Range Status   03/29/2017 66 >60 mL/min/1.7m2 Final     Comment:     Non  GFR Calc     GFR Estimate If Black   Date Value Ref Range Status   03/29/2017 79 >60 mL/min/1.7m2 Final     Comment:      GFR Calc     Lab Results   Component Value Date    CR 1.06 03/29/2017     No results found for: MICROALBUMIN    Health Beliefs and Attitudes:   Patient Activation Measure Survey Score:  FANNY Score (Last Two) 10/14/2015 9/28/2016   FANNY Raw Score 40 29   Activation Score 60 52.9   FANNY Level 3 2       Stage of Change: ACTION (Actively working towards change)    Diabetes knowledge and skills assessment:     Patient is knowledgeable in diabetes management concepts related to: Healthy Eating, Monitoring, Being Active,Taking Medication and Healthy Coping    Patient needs further education " on the following diabetes management concepts: Healthy Eating, Monitoring, Taking Medication, Problem Solving and Reducing Risks    Barriers to Learning Assessment: No Barriers identified    Based on learning assessment above, most appropriate setting for further diabetes education would be: Individual setting.    INTERVENTION:  Pt with increased dose of dexamethasone since last visit and with corresponding increase in blood sugar data. Reports plan is to continue increased dose of steroids for the next few months. As 76% of his FBS's are now elevated the past 2 weeks, recommend to begin basal insulin in addition to his correction scale before meals.  As blood sugars are highest during the day, recommend to begin NPH in the morning. It is recommended to begin either 10 u or 0.2 u/kg in steroid patients.  0.2 u/kg = 17 units for pt. Would recommend to begin conservatively with 10 u QAM.     Education provided today on:  AADE Self-Care Behaviors:  Healthy Eating: consistency in amount, composition, and timing of food intake and portion control. He has been having his juice with meals and is following recommended amount of carbs at meals for the most part.  Educated on including some protein at each meal.    Monitoring: log and interpret results, individual blood glucose targets and frequency of monitoring. Will have him start testing his BG 2 hours after meals as well to help titrate his correction scale.   Problem Solving: low blood glucose - causes, signs/symptoms, treatment and prevention    Opportunities for ongoing education and support in diabetes-self management were discussed.    Pt verbalized understanding of concepts discussed and recommendations provided today.       Education Materials Provided:  Hypoglycemia Signs and Symptoms    PLAN:  See Patient Instructions for co-developed, patient-stated behavior change goals.  Meal Plan Recommendation: eat 3 meals a day and include protein rich foods at each meal.    Check blood sugars fasting, before meals and 2 hours after the start of meals (breakfast, lunch and supper) and bedtime   Keep a blood glucose record for next visit.  Recommend that patient begin basal insulin -- Recommend to begin 10 u NPH QAM.   Recommend current Hgb A1c to be measured.   AVS printed and provided to patient today.    FOLLOW-UP:  Follow-up appointment scheduled on 4/18/17.  Chart routed to referring provider.    Ongoing plan for education and support: Written resources (magazines, books, etc.) and Follow-up visit with diabetes educator in 2 weeks    YAZMIN Salguero (Gray) CDE    Time Spent: 30 minutes  Encounter Type: Individual    Any diabetes medication dose changes were made via the CDE Protocol and Collaborative Practice Agreement with the patient's referring provider. A copy of this encounter was shared with the provider.

## 2017-04-03 NOTE — MR AVS SNAPSHOT
After Visit Summary   4/3/2017    Roc Parkinson    MRN: [id number]           Patient Information     Date Of Birth          7/7/1926        Visit Information        Provider Department      4/3/2017 10:30 AM  DIABETIC ED RESOURCE [facility]        Today's Diagnoses     Type 2 diabetes mellitus with diabetic chronic kidney disease, unspecified CKD stage, unspecified long term insulin use status (H)    -  1      Care Instructions    My Diabetes Care Goals:    Monitoring: Start testing blood sugars 2 hours after meals.     Include protein at each meal.     Follow up:  Call ([phone]), e-mail [email or web address]), or send EVRGRt message with questions, concerns or if follow-up is needed.     Bring blood glucose meter and logbook with you to all doctor and follow-up appointments.     [facility] for the [facility] Area:  For Your Diabetes Education and Nutrition Appointments Call:  [phone]   For Diabetes Education or Nutrition Related Questions:   Phone: [phone]  E-mail: [email or web address]  Fax: [phone]   If you need a medication refill please contact your pharmacy. Please allow 3 business days for your refills to be completed.    Instructions for emailing the Diabetes Educators    If you need to communicate a non-urgent message to a Diabetes Educator via email, please send to [email or web address].    Please follow the following email guidelines:    Subject line: Secure: your clinic name (example: Secure: [facility])  In the email please include: First name, middle initial, last name and date of birth.    We will be in touch with you within one (1) business day.           Follow-ups after your visit        Your next 10 appointments already scheduled     Apr 05, 2017  9:00 AM CDT   Level 5 with  INFUSION CHAIR 4   [facility] ([facility])    [facility]  [place] [address]  [place] [address]   [phone]            Apr 12, 2017  9:00 AM CDT   Level 5 with  INFUSION CHAIR 7   [facility] ([facility])    [facility] [place] [address]  [place] [address]   [phone]            Apr 12, 2017  9:30 AM CDT   Return Visit with Gretel Quinn MD   [facility] ([facility])    [facility] [place] [address]  [place] [address]   [phone]            Apr 17, 2017 10:30 AM CDT   SHORT with Dickson Reich MD   [facility] ([facility])    [address] [place] [address]   [phone]            Apr 18, 2017 12:30 PM CDT   Diabetic Education with  DIABETIC ED RESOURCE   [facility] ([facility])    [address] [id number] [phone]            Apr 19, 2017  9:00 AM CDT   Level 5 with  INFUSION CHAIR 6   [facility] ([facility])    [facility] [place] [address]  [place] [address]   [phone]              Who to contact     If you have questions or need follow up information about today's clinic visit or your schedule please contact [facility] directly at [phone].  Normal or non-critical lab and imaging results will be communicated to you by MyChart, letter or phone within 4 business days after the clinic has received the results. If you do not hear from us within 7 days, please contact the clinic through MyChart or phone. If you have a critical or abnormal lab result, we will notify you by phone as soon as possible.  Submit refill requests through [employer] or call your pharmacy and they will forward the refill request to us. Please  "allow 3 business days for your refill to be completed.          Additional Information About Your Visit        MyChart Information     [employer] lets you send messages to your doctor, view your test results, renew your prescriptions, schedule appointments and more. To sign up, go to [email or web address] . Click on \"Log in\" on the left side of the screen, which will take you to the Welcome page. Then click on \"Sign up Now\" on the right side of the page.     You will be asked to enter the access code listed below, as well as some personal information. Please follow the directions to create your username and password.     Your access code is: [email or web address]  Expires: 2017 12:48 PM     Your access code will  in 90 days. If you need help or a new code, please call your [facility] clinic or [phone].        Care EveryWhere ID     This is your Care EveryWhere ID. This could be used by other organizations to access your [facility] medical records  [id number]         Blood Pressure from Last 3 Encounters:   17 155/70   17 149/75   17 120/70    Weight from Last 3 Encounters:   17 86.5 kg (190 lb 11.2 oz)   17 86.2 kg (190 lb)   17 87.1 kg (192 lb)              Today, you had the following     No orders found for display       Primary Care Provider Office Phone # Fax #    Dickson Reich [phone]       [facility] [address] [facility] [address]        Thank you!     Thank you for choosing [facility]  for your care. Our goal is always to provide you with excellent care. Hearing back from our patients is one way we can continue to improve our services. Please take a few minutes to complete the written survey that you may receive in the mail after your visit with us. Thank you!             Your Updated Medication List - Protect others around you: Learn how to safely use, store and throw away your medicines at " www.disposemymeds.org.          This list is accurate as of: 4/3/17 10:57 AM.  Always use your most recent med list.                   Brand Name Dispense Instructions for use    acyclovir 400 MG tablet    ZOVIRAX    60 tablet    Take 1 tablet (400 mg) by mouth 2 times daily Viral Prophylaxis.       amLODIPine 5 MG tablet    NORVASC    30 tablet    TAKE 1 TABLET BY MOUTH DAILY       * B-D U/F 31G X 8 MM   Generic drug:  insulin pen needle          * B-D U/F 31G X 8 MM   Generic drug:  insulin pen needle     100 each    USE TWICE DAILY OR AS DIRECTED       clotrimazole-betamethasone cream    LOTRISONE    30 g    Apply topically to the head of the penis two times a day for up to 2 weeks.       dexamethasone 4 MG tablet    DECADRON    40 tablet    Take 10 tablets (40 mg) by mouth every 7 days for 4 doses Take daily on Days 1, 8, 15, and 22. Cycles 3 and 4 ONLY.       ferrous sulfate 325 (65 FE) MG tablet    IRON     Take 325 mg by mouth daily (with breakfast)       FREESTYLE LITE test strip   Generic drug:  blood glucose monitoring     300 strip    USE TO TEST FOUR TIMES DAILY       gemfibrozil 600 MG tablet    LOPID    180 tablet    TAKE 1 TABLET BY MOUTH TWICE DAILY       glipiZIDE 10 MG tablet    GLUCOTROL    180 tablet    TAKE 1 TABLET BY MOUTH TWICE DAILY BEFORE A MEAL       insulin aspart 100 UNIT/ML injection    NovoLOG PEN    12 mL    Inject 1-5 Units Subcutaneous 4 times daily (with meals and nightly) Before meal correction: 140-225 - take 1 unit  226-310 - take 2 units 311-395 - take 3 units 396-480 - take 4 units >481 - take 5 units Bedtime correction of: 200-299 give 1 unit 300-399 take 2 units 400 or greater take 3 units       levothyroxine 25 MCG tablet    SYNTHROID/LEVOTHROID    90 tablet    TAKE 1 TABLET BY MOUTH EVERY DAY.       lisinopril 30 MG tablet    PRINIVIL,ZESTRIL    90 tablet    TAKE 1 TABLET BY MOUTH EVERY DAY       metoprolol 25 MG tablet    LOPRESSOR    180 tablet    TAKE 1 TABLET BY MOUTH  TWICE DAILY       omeprazole 20 MG CR capsule    priLOSEC    90 capsule    TAKE 1 CAPSULE BY MOUTH EVERY DAY       ondansetron 8 MG tablet    ZOFRAN    10 tablet    Take 1 tablet (8 mg) by mouth every 8 hours as needed (Nausea/Vomiting)       polyethylene glycol powder    MIRALAX/GLYCOLAX         prochlorperazine 5 MG tablet    COMPAZINE    30 tablet    Take 1 tablet (5 mg) by mouth every 6 hours as needed (Nausea/Vomiting)       Selenium 200 MCG Tabs      Take 100 mcg by mouth daily. Pt takes one half tab of the 200 mcg daily       tamsulosin 0.4 MG capsule    FLOMAX    90 capsule    TAKE 1 CAPSULE BY MOUTH DAILY       VITAMIN C PO      Take 1,000 mg by mouth daily       VITAMIN D3 PO      Take 1,000 Units by mouth daily       * Notice:  This list has 2 medication(s) that are the same as other medications prescribed for you. Read the directions carefully, and ask your doctor or other care provider to review them with you.

## 2017-04-03 NOTE — PATIENT INSTRUCTIONS
My Diabetes Care Goals:    Monitoring: Start testing blood sugars 2 hours after meals.     Include protein at each meal.     Follow up:  Call ([phone]), e-mail ([email or web address]), or send [employer] message with questions, concerns or if follow-up is needed.     Bring blood glucose meter and logbook with you to all doctor and follow-up appointments.     [facility] for the [facility] Area:  For Your Diabetes Education and Nutrition Appointments Call:  [phone]   For Diabetes Education or Nutrition Related Questions:   Phone: [phone]  E-mail: [email or web address]  Fax: [phone]   If you need a medication refill please contact your pharmacy. Please allow 3 business days for your refills to be completed.    Instructions for emailing the Diabetes Educators    If you need to communicate a non-urgent message to a Diabetes Educator via email, please send to [email or web address].    Please follow the following email guidelines:    Subject line: Secure: your clinic name (example: Secure: [facility])  In the email please include: First name, middle initial, last name and date of birth.    We will be in touch with you within one (1) business day.

## 2017-04-03 NOTE — PROGRESS NOTES
MD in agreement with plan for 10 u NPH in the morning.    Ordered 10 u NPH to be taken before breakfast. Called pt and updated him about dose and when to take new insulin. Instructed him to continue taking his mealtime correction and bedtime correction as he is currently. Educated that NPH pens are good for 14 days at room temp and to store extra pens in the fridge and reiterated that this is different than his other pens which are good for 28 days at room temp. Answered his questions.    Kateryna Quiñonez) Devon, RD LD CDE

## 2017-04-03 NOTE — Clinical Note
Pt with increased dose of steroids now and with corresponding elevated blood sugars. 76% of FBS elevated the past 2 weeks. Recommend to begin NPH insulin QAM - 10 units. Please let me know if you are in agreement and I can update patient (feel free to order otherwise I can order it as well).  Recommend dose in the AM to prevent lows overnight and help treat daytime highs.   Kateryna (Abram) Ettestenio, YAZMIN LD CDE

## 2017-04-04 ENCOUNTER — TELEPHONE (OUTPATIENT)
Dept: EDUCATION SERVICES | Facility: CLINIC | Age: 82
End: 2017-04-04

## 2017-04-04 DIAGNOSIS — E11.22 TYPE 2 DIABETES MELLITUS WITH DIABETIC CHRONIC KIDNEY DISEASE, UNSPECIFIED CKD STAGE, UNSPECIFIED LONG TERM INSULIN USE STATUS: Primary | ICD-10-CM

## 2017-04-04 NOTE — TELEPHONE ENCOUNTER
CDE called pharmacy and pt needs PA for NPH insulin. Insurance prefers a jake product (such as Levemir). Will route to MD to see if he prefers to run a PA for this patient to cover NPH. Otherwise, can trial Levemir 10 u QAM.  As levemir lasts longer than NPH in the body, pt will need to check bedtime glucose and if <100 he will need a snack and would recommend he check his BG around 2-3 am to ensure he is not low overnight. If he falls low overnight on Levemir, then would strongly recommend a switch to NPH and PA for patient as this would target his daytime highs.     Kateryna Quiñonez) Devon, RD LD CDE

## 2017-04-04 NOTE — TELEPHONE ENCOUNTER
Will change med order to Levemir 10 u QAM for patient and called to update. Educated to check his BG before bed and if <100 then he should have a snack and check his BG overnight to check for lows. Instructed him to let us know if he has any hypoglycemia episodes. Educated that levemir is good for 42 days at room temp and to store the extra pens in the fridge. Answered his questions and encouraged him to call if he has any other issues with filling his rx.     Kateryna Quiñonez) Devon, RD LD CDE

## 2017-04-05 ENCOUNTER — INFUSION THERAPY VISIT (OUTPATIENT)
Dept: INFUSION THERAPY | Facility: CLINIC | Age: 82
End: 2017-04-05
Attending: INTERNAL MEDICINE
Payer: MEDICARE

## 2017-04-05 ENCOUNTER — HOSPITAL ENCOUNTER (OUTPATIENT)
Facility: CLINIC | Age: 82
Setting detail: SPECIMEN
Discharge: HOME | End: 2017-04-05
Attending: INTERNAL MEDICINE | Admitting: INTERNAL MEDICINE
Payer: MEDICARE

## 2017-04-05 VITALS
RESPIRATION RATE: 16 BRPM | TEMPERATURE: 98.1 F | DIASTOLIC BLOOD PRESSURE: 69 MMHG | HEART RATE: 75 BPM | SYSTOLIC BLOOD PRESSURE: 148 MMHG

## 2017-04-05 DIAGNOSIS — C90.00 MULTIPLE MYELOMA NOT HAVING ACHIEVED REMISSION (H): Primary | ICD-10-CM

## 2017-04-05 LAB
ALBUMIN SERPL-MCNC: 2.6 G/DL (ref 3.4–5)
ALP SERPL-CCNC: 68 U/L (ref 40–150)
ALT SERPL W P-5'-P-CCNC: 16 U/L (ref 0–70)
ANION GAP SERPL CALCULATED.3IONS-SCNC: 8 MMOL/L (ref 3–14)
AST SERPL W P-5'-P-CCNC: 12 U/L (ref 0–45)
BASOPHILS # BLD AUTO: 0 10E9/L (ref 0–0.2)
BASOPHILS NFR BLD AUTO: 0 %
BILIRUB SERPL-MCNC: 0.4 MG/DL (ref 0.2–1.3)
BUN SERPL-MCNC: 42 MG/DL (ref 7–30)
CALCIUM SERPL-MCNC: 9.2 MG/DL (ref 8.5–10.1)
CHLORIDE SERPL-SCNC: 104 MMOL/L (ref 94–109)
CO2 SERPL-SCNC: 22 MMOL/L (ref 20–32)
CREAT SERPL-MCNC: 1.09 MG/DL (ref 0.66–1.25)
DIFFERENTIAL METHOD BLD: ABNORMAL
EOSINOPHIL # BLD AUTO: 0 10E9/L (ref 0–0.7)
EOSINOPHIL NFR BLD AUTO: 0.5 %
ERYTHROCYTE [DISTWIDTH] IN BLOOD BY AUTOMATED COUNT: 24.3 % (ref 10–15)
GFR SERPL CREATININE-BSD FRML MDRD: 63 ML/MIN/1.7M2
GLUCOSE SERPL-MCNC: 311 MG/DL (ref 70–99)
HCT VFR BLD AUTO: 25 % (ref 40–53)
HGB BLD-MCNC: 8.4 G/DL (ref 13.3–17.7)
IMM GRANULOCYTES # BLD: 0 10E9/L (ref 0–0.4)
IMM GRANULOCYTES NFR BLD: 0.5 %
LYMPHOCYTES # BLD AUTO: 0.6 10E9/L (ref 0.8–5.3)
LYMPHOCYTES NFR BLD AUTO: 14.5 %
MCH RBC QN AUTO: 33.6 PG (ref 26.5–33)
MCHC RBC AUTO-ENTMCNC: 33.6 G/DL (ref 31.5–36.5)
MCV RBC AUTO: 100 FL (ref 78–100)
MONOCYTES # BLD AUTO: 0.1 10E9/L (ref 0–1.3)
MONOCYTES NFR BLD AUTO: 2.5 %
NEUTROPHILS # BLD AUTO: 3.3 10E9/L (ref 1.6–8.3)
NEUTROPHILS NFR BLD AUTO: 82 %
NRBC # BLD AUTO: 0 10*3/UL
NRBC BLD AUTO-RTO: 0 /100
PLATELET # BLD AUTO: 28 10E9/L (ref 150–450)
POTASSIUM SERPL-SCNC: 5.4 MMOL/L (ref 3.4–5.3)
PROT SERPL-MCNC: 9.5 G/DL (ref 6.8–8.8)
RBC # BLD AUTO: 2.5 10E12/L (ref 4.4–5.9)
SODIUM SERPL-SCNC: 134 MMOL/L (ref 133–144)
WBC # BLD AUTO: 4 10E9/L (ref 4–11)

## 2017-04-05 PROCEDURE — 85025 COMPLETE CBC W/AUTO DIFF WBC: CPT | Performed by: INTERNAL MEDICINE

## 2017-04-05 PROCEDURE — 96401 CHEMO ANTI-NEOPL SQ/IM: CPT

## 2017-04-05 PROCEDURE — 25000128 H RX IP 250 OP 636: Mod: JW | Performed by: INTERNAL MEDICINE

## 2017-04-05 PROCEDURE — 80053 COMPREHEN METABOLIC PANEL: CPT | Performed by: INTERNAL MEDICINE

## 2017-04-05 RX ADMIN — BORTEZOMIB 2.1 MG: 3.5 INJECTION, POWDER, LYOPHILIZED, FOR SOLUTION INTRAVENOUS; SUBCUTANEOUS at 10:35

## 2017-04-05 ASSESSMENT — PAIN SCALES - GENERAL: PAINLEVEL: NO PAIN (0)

## 2017-04-05 NOTE — PROGRESS NOTES
Infusion Nursing Note:  Roc Parkinson presents today for C4D22 .    Patient seen by provider today: No   present during visit today: Not Applicable.    Note: N/A.    Intravenous Access:  Lab draw site right AC, Needle type butterfly, Gauge 23.    Treatment Conditions:  Lab Results   Component Value Date    HGB 8.4 04/05/2017     Lab Results   Component Value Date    WBC 4.0 04/05/2017      Lab Results   Component Value Date    ANEU 3.3 04/05/2017     Lab Results   Component Value Date    PLT 28 04/05/2017      Lab Results   Component Value Date     03/29/2017                   Lab Results   Component Value Date    POTASSIUM 5.2 03/29/2017           No results found for: MAG         Lab Results   Component Value Date    CR 1.06 03/29/2017                   Lab Results   Component Value Date    MICHELLE 9.2 03/29/2017                Lab Results   Component Value Date    BILITOTAL 0.5 03/29/2017           Lab Results   Component Value Date    ALBUMIN 2.7 03/29/2017                    Lab Results   Component Value Date    ALT 17 03/29/2017           Lab Results   Component Value Date    AST 10 03/29/2017     Results reviewed, labs did NOT meet treatment parameters: Dr Kai yee'd pt to get velcade even though patients platelet count was 28. V.O.R.B.       Post Infusion Assessment:  Patient tolerated injection without incident.  Site patent and intact, free from redness, edema or discomfort.    Discharge Plan:   Patient and/or family verbalized understanding of discharge instructions and all questions answered.  Copy of AVS reviewed with patient and/or family.  Patient will return next week for next appointment.  Patient discharged in stable condition accompanied by: self.  Departure Mode: Ambulatory.    Fernanda Bailey RN

## 2017-04-05 NOTE — MR AVS SNAPSHOT
After Visit Summary   4/5/2017    Roc Parkinson    MRN: [id number]           Patient Information     Date Of Birth          7/7/1926        Visit Information        Provider Department      4/5/2017 9:00 AM  INFUSION CHAIR 4 [facility]        Today's Diagnoses     Multiple myeloma not having achieved remission (H)    -  1       Follow-ups after your visit        Your next 10 appointments already scheduled     Apr 12, 2017  9:00 AM CDT   Level 5 with  INFUSION CHAIR 7   [facility] ([facility])    [facility] [employer]  [address]  [place] [address]   [phone]            Apr 12, 2017  9:30 AM CDT   Return Visit with Gretel Quinn MD   [facility] ([facility])    [facility] [employer]  [address]  [place] [address]   [phone]            Apr 17, 2017 10:30 AM CDT   SHORT with Dickson Reich MD   [facility] ([facility])    [address] [place] [address]   [phone]            Apr 18, 2017 12:30 PM CDT   Diabetic Education with  DIABETIC ED RESOURCE   [facility] ([facility])    [address] [address]   [phone]            Apr 19, 2017  9:00 AM CDT   Level 5 with  INFUSION CHAIR 6   [facility] ([facility])    [facility] [employer]  [address]  [place] [address]   [phone]              Who to contact     If you have questions or need follow up information about today's clinic visit or your schedule please contact [facility] [place] directly at [phone].  Normal or non-critical lab and imaging results will be communicated to you by MyChart, letter  "or phone within 4 business days after the clinic has received the results. If you do not hear from us within 7 days, please contact the clinic through [employer] or phone. If you have a critical or abnormal lab result, we will notify you by phone as soon as possible.  Submit refill requests through [employer] or call your pharmacy and they will forward the refill request to us. Please allow 3 business days for your refill to be completed.          Additional Information About Your Visit        [employer] Information     [employer] lets you send messages to your doctor, view your test results, renew your prescriptions, schedule appointments and more. To sign up, go to [email or web address] . Click on \"Log in\" on the left side of the screen, which will take you to the Welcome page. Then click on \"Sign up Now\" on the right side of the page.     You will be asked to enter the access code listed below, as well as some personal information. Please follow the directions to create your username and password.     Your access code is: [email or web address]  Expires: 2017 12:48 PM     Your access code will  in 90 days. If you need help or a new code, please call your [place] clinic or [phone].        Care EveryWhere ID     This is your Care EveryWhere ID. This could be used by other organizations to access your [place] medical records  [id number]        Your Vitals Were     Pulse Temperature Respirations             75 98.1  F (36.7  C) (Oral) 16          Blood Pressure from Last 3 Encounters:   17 148/69   17 155/70   17 149/75    Weight from Last 3 Encounters:   17 86.5 kg (190 lb 11.2 oz)   17 86.2 kg (190 lb)   17 87.1 kg (192 lb)              We Performed the Following     CBC with platelets differential     Comprehensive metabolic panel     Treatment Conditions        Primary Care Provider Office Phone # Fax #    Dickson Reich [phone]       [facility]" [place] CAMERON [address]  [facility] [address]        Thank you!     Thank you for choosing [facility]  for your care. Our goal is always to provide you with excellent care. Hearing back from our patients is one way we can continue to improve our services. Please take a few minutes to complete the written survey that you may receive in the mail after your visit with us. Thank you!             Your Updated Medication List - Protect others around you: Learn how to safely use, store and throw away your medicines at www.disposemymeds.org.          This list is accurate as of: 4/5/17 10:44 AM.  Always use your most recent med list.                   Brand Name Dispense Instructions for use    acyclovir 400 MG tablet    ZOVIRAX    60 tablet    Take 1 tablet (400 mg) by mouth 2 times daily Viral Prophylaxis.       amLODIPine 5 MG tablet    NORVASC    30 tablet    TAKE 1 TABLET BY MOUTH DAILY       * B-D U/F 31G X 8 MM   Generic drug:  insulin pen needle          * insulin pen needle 31G X 8 MM    B-D U/F    100 each    Use 5 pen needles daily or as directed.       clotrimazole-betamethasone cream    LOTRISONE    30 g    Apply topically to the head of the penis two times a day for up to 2 weeks.       dexamethasone 4 MG tablet    DECADRON    40 tablet    Take 10 tablets (40 mg) by mouth every 7 days for 4 doses Take daily on Days 1, 8, 15, and 22. Cycles 3 and 4 ONLY.       ferrous sulfate 325 (65 FE) MG tablet    IRON     Take 325 mg by mouth daily (with breakfast)       FREESTYLE LITE test strip   Generic drug:  blood glucose monitoring     300 strip    USE TO TEST FOUR TIMES DAILY       gemfibrozil 600 MG tablet    LOPID    180 tablet    TAKE 1 TABLET BY MOUTH TWICE DAILY       glipiZIDE 10 MG tablet    GLUCOTROL    180 tablet    TAKE 1 TABLET BY MOUTH TWICE DAILY BEFORE A MEAL       insulin aspart 100 UNIT/ML injection    NovoLOG PEN    12 mL    Inject 1-5 Units Subcutaneous 4 times  daily (with meals and nightly) Before meal correction: 140-225 - take 1 unit  226-310 - take 2 units 311-395 - take 3 units 396-480 - take 4 units >481 - take 5 units Bedtime correction of: 200-299 give 1 unit 300-399 take 2 units 400 or greater take 3 units       insulin detemir 100 UNIT/ML injection    LEVEMIR FLEXPEN/FLEXTOUCH    15 mL    Inject 10 Units Subcutaneous every morning (before breakfast)       levothyroxine 25 MCG tablet    SYNTHROID/LEVOTHROID    90 tablet    TAKE 1 TABLET BY MOUTH EVERY DAY.       lisinopril 30 MG tablet    PRINIVIL,ZESTRIL    90 tablet    TAKE 1 TABLET BY MOUTH EVERY DAY       metoprolol 25 MG tablet    LOPRESSOR    180 tablet    TAKE 1 TABLET BY MOUTH TWICE DAILY       omeprazole 20 MG CR capsule    priLOSEC    90 capsule    TAKE 1 CAPSULE BY MOUTH EVERY DAY       ondansetron 8 MG tablet    ZOFRAN    10 tablet    Take 1 tablet (8 mg) by mouth every 8 hours as needed (Nausea/Vomiting)       polyethylene glycol powder    MIRALAX/GLYCOLAX         prochlorperazine 5 MG tablet    COMPAZINE    30 tablet    Take 1 tablet (5 mg) by mouth every 6 hours as needed (Nausea/Vomiting)       Selenium 200 MCG Tabs      Take 100 mcg by mouth daily. Pt takes one half tab of the 200 mcg daily       tamsulosin 0.4 MG capsule    FLOMAX    90 capsule    TAKE 1 CAPSULE BY MOUTH DAILY       VITAMIN C PO      Take 1,000 mg by mouth daily       VITAMIN D3 PO      Take 1,000 Units by mouth daily       * Notice:  This list has 2 medication(s) that are the same as other medications prescribed for you. Read the directions carefully, and ask your doctor or other care provider to review them with you.

## 2017-04-12 ENCOUNTER — ONCOLOGY VISIT (OUTPATIENT)
Dept: ONCOLOGY | Facility: CLINIC | Age: 82
End: 2017-04-12
Attending: INTERNAL MEDICINE
Payer: MEDICARE

## 2017-04-12 ENCOUNTER — INFUSION THERAPY VISIT (OUTPATIENT)
Dept: INFUSION THERAPY | Facility: CLINIC | Age: 82
End: 2017-04-12
Attending: INTERNAL MEDICINE
Payer: MEDICARE

## 2017-04-12 ENCOUNTER — HOSPITAL ENCOUNTER (OUTPATIENT)
Facility: CLINIC | Age: 82
Setting detail: SPECIMEN
Discharge: HOME | End: 2017-04-12
Attending: INTERNAL MEDICINE | Admitting: INTERNAL MEDICINE
Payer: MEDICARE

## 2017-04-12 ENCOUNTER — DOCUMENTATION ONLY (OUTPATIENT)
Dept: PHARMACY | Facility: CLINIC | Age: 82
End: 2017-04-12

## 2017-04-12 VITALS
HEIGHT: 70 IN | WEIGHT: 195.2 LBS | SYSTOLIC BLOOD PRESSURE: 168 MMHG | TEMPERATURE: 98.3 F | RESPIRATION RATE: 16 BRPM | DIASTOLIC BLOOD PRESSURE: 81 MMHG | BODY MASS INDEX: 27.94 KG/M2 | HEART RATE: 70 BPM

## 2017-04-12 VITALS
WEIGHT: 195.2 LBS | DIASTOLIC BLOOD PRESSURE: 57 MMHG | SYSTOLIC BLOOD PRESSURE: 121 MMHG | TEMPERATURE: 98.4 F | HEIGHT: 70 IN | BODY MASS INDEX: 27.94 KG/M2 | HEART RATE: 70 BPM | RESPIRATION RATE: 16 BRPM

## 2017-04-12 DIAGNOSIS — C90.00 MULTIPLE MYELOMA NOT HAVING ACHIEVED REMISSION (H): Primary | ICD-10-CM

## 2017-04-12 DIAGNOSIS — D53.9 MACROCYTIC ANEMIA: ICD-10-CM

## 2017-04-12 LAB
ABO + RH BLD: NORMAL
ABO + RH BLD: NORMAL
ALBUMIN SERPL-MCNC: 2.7 G/DL (ref 3.4–5)
ALP SERPL-CCNC: 67 U/L (ref 40–150)
ALT SERPL W P-5'-P-CCNC: 14 U/L (ref 0–70)
ANION GAP SERPL CALCULATED.3IONS-SCNC: 9 MMOL/L (ref 3–14)
AST SERPL W P-5'-P-CCNC: 10 U/L (ref 0–45)
BASOPHILS # BLD AUTO: 0 10E9/L (ref 0–0.2)
BASOPHILS NFR BLD AUTO: 0 %
BILIRUB SERPL-MCNC: 0.4 MG/DL (ref 0.2–1.3)
BLD GP AB SCN SERPL QL: NORMAL
BLD PROD TYP BPU: NORMAL
BLD UNIT ID BPU: 0
BLD UNIT ID BPU: 0
BLOOD BANK CMNT PATIENT-IMP: NORMAL
BLOOD PRODUCT CODE: NORMAL
BLOOD PRODUCT CODE: NORMAL
BPU ID: NORMAL
BPU ID: NORMAL
BUN SERPL-MCNC: 39 MG/DL (ref 7–30)
CALCIUM SERPL-MCNC: 9.2 MG/DL (ref 8.5–10.1)
CHLORIDE SERPL-SCNC: 105 MMOL/L (ref 94–109)
CO2 SERPL-SCNC: 21 MMOL/L (ref 20–32)
CREAT SERPL-MCNC: 0.94 MG/DL (ref 0.66–1.25)
DIFFERENTIAL METHOD BLD: ABNORMAL
EOSINOPHIL # BLD AUTO: 0 10E9/L (ref 0–0.7)
EOSINOPHIL NFR BLD AUTO: 0.3 %
ERYTHROCYTE [DISTWIDTH] IN BLOOD BY AUTOMATED COUNT: 24.8 % (ref 10–15)
GFR SERPL CREATININE-BSD FRML MDRD: 75 ML/MIN/1.7M2
GLUCOSE SERPL-MCNC: 286 MG/DL (ref 70–99)
HCT VFR BLD AUTO: 22.4 % (ref 40–53)
HGB BLD-MCNC: 7.6 G/DL (ref 13.3–17.7)
IMM GRANULOCYTES # BLD: 0 10E9/L (ref 0–0.4)
IMM GRANULOCYTES NFR BLD: 0.3 %
LYMPHOCYTES # BLD AUTO: 0.6 10E9/L (ref 0.8–5.3)
LYMPHOCYTES NFR BLD AUTO: 18.7 %
MCH RBC QN AUTO: 34.1 PG (ref 26.5–33)
MCHC RBC AUTO-ENTMCNC: 33.9 G/DL (ref 31.5–36.5)
MCV RBC AUTO: 100 FL (ref 78–100)
MONOCYTES # BLD AUTO: 0.1 10E9/L (ref 0–1.3)
MONOCYTES NFR BLD AUTO: 2.7 %
NEUTROPHILS # BLD AUTO: 2.3 10E9/L (ref 1.6–8.3)
NEUTROPHILS NFR BLD AUTO: 78 %
NRBC # BLD AUTO: 0 10*3/UL
NRBC BLD AUTO-RTO: 0 /100
NUM BPU REQUESTED: 1
PLATELET # BLD AUTO: 37 10E9/L (ref 150–450)
POTASSIUM SERPL-SCNC: 5.3 MMOL/L (ref 3.4–5.3)
PROT SERPL-MCNC: 9.2 G/DL (ref 6.8–8.8)
RBC # BLD AUTO: 2.23 10E12/L (ref 4.4–5.9)
SODIUM SERPL-SCNC: 135 MMOL/L (ref 133–144)
SPECIMEN EXP DATE BLD: NORMAL
TRANSFUSION STATUS PATIENT QL: NORMAL
WBC # BLD AUTO: 3 10E9/L (ref 4–11)

## 2017-04-12 PROCEDURE — P9016 RBC LEUKOCYTES REDUCED: HCPCS | Performed by: INTERNAL MEDICINE

## 2017-04-12 PROCEDURE — 86901 BLOOD TYPING SEROLOGIC RH(D): CPT | Performed by: INTERNAL MEDICINE

## 2017-04-12 PROCEDURE — 86900 BLOOD TYPING SEROLOGIC ABO: CPT | Performed by: INTERNAL MEDICINE

## 2017-04-12 PROCEDURE — 25000128 H RX IP 250 OP 636: Performed by: INTERNAL MEDICINE

## 2017-04-12 PROCEDURE — 80053 COMPREHEN METABOLIC PANEL: CPT | Performed by: INTERNAL MEDICINE

## 2017-04-12 PROCEDURE — 86923 COMPATIBILITY TEST ELECTRIC: CPT | Performed by: INTERNAL MEDICINE

## 2017-04-12 PROCEDURE — 86850 RBC ANTIBODY SCREEN: CPT | Performed by: INTERNAL MEDICINE

## 2017-04-12 PROCEDURE — 85025 COMPLETE CBC W/AUTO DIFF WBC: CPT | Performed by: INTERNAL MEDICINE

## 2017-04-12 PROCEDURE — 99215 OFFICE O/P EST HI 40 MIN: CPT | Performed by: INTERNAL MEDICINE

## 2017-04-12 PROCEDURE — 36430 TRANSFUSION BLD/BLD COMPNT: CPT

## 2017-04-12 PROCEDURE — 96401 CHEMO ANTI-NEOPL SQ/IM: CPT

## 2017-04-12 RX ORDER — DIPHENHYDRAMINE HYDROCHLORIDE 50 MG/ML
50 INJECTION INTRAMUSCULAR; INTRAVENOUS
Status: CANCELLED
Start: 2017-04-19

## 2017-04-12 RX ORDER — ALBUTEROL SULFATE 0.83 MG/ML
2.5 SOLUTION RESPIRATORY (INHALATION)
Status: CANCELLED | OUTPATIENT
Start: 2017-04-19

## 2017-04-12 RX ORDER — LORAZEPAM 2 MG/ML
0.5 INJECTION INTRAMUSCULAR EVERY 4 HOURS PRN
Status: CANCELLED
Start: 2017-05-03

## 2017-04-12 RX ORDER — LORAZEPAM 2 MG/ML
0.5 INJECTION INTRAMUSCULAR EVERY 4 HOURS PRN
Status: CANCELLED
Start: 2017-04-19

## 2017-04-12 RX ORDER — ALBUTEROL SULFATE 0.83 MG/ML
2.5 SOLUTION RESPIRATORY (INHALATION)
Status: CANCELLED | OUTPATIENT
Start: 2017-04-12

## 2017-04-12 RX ORDER — ALBUTEROL SULFATE 90 UG/1
1-2 AEROSOL, METERED RESPIRATORY (INHALATION)
Status: CANCELLED
Start: 2017-04-19

## 2017-04-12 RX ORDER — DEXAMETHASONE 4 MG/1
40 TABLET ORAL
Qty: 40 TABLET | Refills: 0 | Status: SHIPPED | OUTPATIENT
Start: 2017-04-12 | End: 2017-05-04

## 2017-04-12 RX ORDER — SODIUM CHLORIDE 9 MG/ML
1000 INJECTION, SOLUTION INTRAVENOUS CONTINUOUS PRN
Status: CANCELLED
Start: 2017-04-26

## 2017-04-12 RX ORDER — EPINEPHRINE 0.3 MG/.3ML
0.3 INJECTION SUBCUTANEOUS EVERY 5 MIN PRN
Status: CANCELLED | OUTPATIENT
Start: 2017-04-26

## 2017-04-12 RX ORDER — METHYLPREDNISOLONE SODIUM SUCCINATE 125 MG/2ML
125 INJECTION, POWDER, LYOPHILIZED, FOR SOLUTION INTRAMUSCULAR; INTRAVENOUS
Status: CANCELLED
Start: 2017-05-03

## 2017-04-12 RX ORDER — MEPERIDINE HYDROCHLORIDE 25 MG/ML
25 INJECTION INTRAMUSCULAR; INTRAVENOUS; SUBCUTANEOUS EVERY 30 MIN PRN
Status: CANCELLED | OUTPATIENT
Start: 2017-04-26

## 2017-04-12 RX ORDER — SODIUM CHLORIDE 9 MG/ML
1000 INJECTION, SOLUTION INTRAVENOUS CONTINUOUS PRN
Status: CANCELLED
Start: 2017-04-19

## 2017-04-12 RX ORDER — METHYLPREDNISOLONE SODIUM SUCCINATE 125 MG/2ML
125 INJECTION, POWDER, LYOPHILIZED, FOR SOLUTION INTRAMUSCULAR; INTRAVENOUS
Status: CANCELLED
Start: 2017-04-26

## 2017-04-12 RX ORDER — DIPHENHYDRAMINE HYDROCHLORIDE 50 MG/ML
50 INJECTION INTRAMUSCULAR; INTRAVENOUS
Status: CANCELLED
Start: 2017-04-26

## 2017-04-12 RX ORDER — METHYLPREDNISOLONE SODIUM SUCCINATE 125 MG/2ML
125 INJECTION, POWDER, LYOPHILIZED, FOR SOLUTION INTRAMUSCULAR; INTRAVENOUS
Status: CANCELLED
Start: 2017-04-12

## 2017-04-12 RX ORDER — MEPERIDINE HYDROCHLORIDE 25 MG/ML
25 INJECTION INTRAMUSCULAR; INTRAVENOUS; SUBCUTANEOUS EVERY 30 MIN PRN
Status: CANCELLED | OUTPATIENT
Start: 2017-04-12

## 2017-04-12 RX ORDER — SODIUM CHLORIDE 9 MG/ML
1000 INJECTION, SOLUTION INTRAVENOUS CONTINUOUS PRN
Status: CANCELLED
Start: 2017-04-12

## 2017-04-12 RX ORDER — ALBUTEROL SULFATE 0.83 MG/ML
2.5 SOLUTION RESPIRATORY (INHALATION)
Status: CANCELLED | OUTPATIENT
Start: 2017-05-03

## 2017-04-12 RX ORDER — DIPHENHYDRAMINE HYDROCHLORIDE 50 MG/ML
50 INJECTION INTRAMUSCULAR; INTRAVENOUS
Status: CANCELLED
Start: 2017-04-12

## 2017-04-12 RX ORDER — ALBUTEROL SULFATE 90 UG/1
1-2 AEROSOL, METERED RESPIRATORY (INHALATION)
Status: CANCELLED
Start: 2017-04-12

## 2017-04-12 RX ORDER — ALBUTEROL SULFATE 90 UG/1
1-2 AEROSOL, METERED RESPIRATORY (INHALATION)
Status: CANCELLED
Start: 2017-05-03

## 2017-04-12 RX ORDER — SODIUM CHLORIDE 9 MG/ML
1000 INJECTION, SOLUTION INTRAVENOUS CONTINUOUS PRN
Status: CANCELLED
Start: 2017-05-03

## 2017-04-12 RX ORDER — EPINEPHRINE 0.3 MG/.3ML
0.3 INJECTION SUBCUTANEOUS EVERY 5 MIN PRN
Status: CANCELLED | OUTPATIENT
Start: 2017-05-03

## 2017-04-12 RX ORDER — LORAZEPAM 2 MG/ML
0.5 INJECTION INTRAMUSCULAR EVERY 4 HOURS PRN
Status: CANCELLED
Start: 2017-04-26

## 2017-04-12 RX ORDER — METHYLPREDNISOLONE SODIUM SUCCINATE 125 MG/2ML
125 INJECTION, POWDER, LYOPHILIZED, FOR SOLUTION INTRAMUSCULAR; INTRAVENOUS
Status: CANCELLED
Start: 2017-04-19

## 2017-04-12 RX ORDER — ALBUTEROL SULFATE 0.83 MG/ML
2.5 SOLUTION RESPIRATORY (INHALATION)
Status: CANCELLED | OUTPATIENT
Start: 2017-04-26

## 2017-04-12 RX ORDER — MEPERIDINE HYDROCHLORIDE 25 MG/ML
25 INJECTION INTRAMUSCULAR; INTRAVENOUS; SUBCUTANEOUS EVERY 30 MIN PRN
Status: CANCELLED | OUTPATIENT
Start: 2017-05-03

## 2017-04-12 RX ORDER — EPINEPHRINE 0.3 MG/.3ML
0.3 INJECTION SUBCUTANEOUS EVERY 5 MIN PRN
Status: CANCELLED | OUTPATIENT
Start: 2017-04-19

## 2017-04-12 RX ORDER — DIPHENHYDRAMINE HYDROCHLORIDE 50 MG/ML
50 INJECTION INTRAMUSCULAR; INTRAVENOUS
Status: CANCELLED
Start: 2017-05-03

## 2017-04-12 RX ORDER — ALBUTEROL SULFATE 90 UG/1
1-2 AEROSOL, METERED RESPIRATORY (INHALATION)
Status: CANCELLED
Start: 2017-04-26

## 2017-04-12 RX ORDER — LORAZEPAM 2 MG/ML
0.5 INJECTION INTRAMUSCULAR EVERY 4 HOURS PRN
Status: CANCELLED
Start: 2017-04-12

## 2017-04-12 RX ORDER — MEPERIDINE HYDROCHLORIDE 25 MG/ML
25 INJECTION INTRAMUSCULAR; INTRAVENOUS; SUBCUTANEOUS EVERY 30 MIN PRN
Status: CANCELLED | OUTPATIENT
Start: 2017-04-19

## 2017-04-12 RX ORDER — EPINEPHRINE 0.3 MG/.3ML
0.3 INJECTION SUBCUTANEOUS EVERY 5 MIN PRN
Status: CANCELLED | OUTPATIENT
Start: 2017-04-12

## 2017-04-12 RX ADMIN — BORTEZOMIB 2.1 MG: 3.5 INJECTION, POWDER, LYOPHILIZED, FOR SOLUTION INTRAVENOUS; SUBCUTANEOUS at 10:48

## 2017-04-12 ASSESSMENT — PAIN SCALES - GENERAL
PAINLEVEL: NO PAIN (0)
PAINLEVEL: NO PAIN (0)

## 2017-04-12 NOTE — PROGRESS NOTES
"[facility] PHYSICIANS  HEMATOLOGY ONCOLOGY     DIAGNOSIS:    1- Kappa light chain IgM multiple myeloma in a 89-year-old patient.  Bone marrow biopsy on 11/17/2016 showed hypercellular bone marrow with 65% cellularity of light chain restricted plasma cells.  FISH or G-banding could not be performed due to insufficient sample.   There is a background of myelodysplastic syndrome.  The patient was initially seen in the hospital on 11/17/2016 for macrocytic anemia and pancytopenia and transfusion requirement and a bone marrow biopsy was performed.   2- History of prostate cancer s/p EBRT s/p brachytherapy in 2003 (recent PSA 0.10), superficial bladder cancer ,no recurrences since 1986     TREATMENT: 12/15/16 velcade/dex      SUBJECTIVE:  The patient was seen as a followup today. He continues to be at his baseline. No significant side effects from treatment. He continues to need transfusion support.     REVIEW OF SYSTEMS:  A complete review of systems was performed and found to be negative other than pertinent positives mentioned in history of present illness.     Past medical, social histories reviewed.    Meds- Reviewed.     PHYSICAL EXAMINATION:   VITAL SIGNS:/57 (BP Location: Left arm)  Pulse 70  Temp 98.4  F (36.9  C) (Oral)  Resp 16  Ht 1.778 m (5' 10\")  Wt 88.5 kg (195 lb 3.2 oz)  BMI 28.01 kg/m2  GENERAL: Sitting comfortably.   HEENT: Pupils are equal. Oropharynx is clear.   NECK: No cervical or supraclavicular lymphadenopathy.   LUNGS: Clear bilaterally.   HEART: S1, S2, regular.   ABDOMEN: Soft, nontender, nondistended, no hepatosplenomegaly.   EXTREMITIES: Warm, well perfused.   NEUROLOGIC: Alert, awake.   SKIN: No rash.   LYMPHATICS: No edema.      LABORATORY DATA:   Recent Labs   Lab Test  04/12/17   0910  04/05/17   0920   NA  135  134   POTASSIUM  5.3  5.4*   CHLORIDE  105  104   CO2  21  22   ANIONGAP  9  8   BUN  39*  42*   CR  0.94  1.09   GLC  286*  311*   MICHELLE  9.2  9.2 "     Recent Labs   Lab Test  04/12/17   0910  04/05/17   0920  03/29/17   0850   WBC  3.0*  4.0  3.0*   HGB  7.6*  8.4*  8.0*   PLT  37*  28*  36*   MCV  100  100  99   NEUTROPHIL  78.0  82.0  75.4     Recent Labs   Lab Test  04/12/17   0910  04/05/17   0920  03/29/17   0850   11/17/16   0938   BILITOTAL  0.4  0.4  0.5   < >   --    ALKPHOS  67  68  68   < >   --    ALT  14  16  17   < >   --    AST  10  12  10   < >   --    ALBUMIN  2.7*  2.6*  2.7*   < >   --    LDH   --    --    --    --   110    < > = values in this interval not displayed.     Results for JACOB MEDELLIN (MRN [id number]) as of 4/12/2017 09:52   Ref. Range 2/8/2017 13:00 3/15/2017 09:30   Gamma Fraction Latest Ref Range: 0.7 - 1.6 g/dL 3.9 (H) 3.9 (H)   IGA Latest Ref Range: 70 - 380 mg/dL 26 (L) 11 (L)   IGG Latest Ref Range: 695 - 1620 mg/dL 378 (L) 382 (L)   IGM Latest Ref Range: 60 - 265 mg/dL 6470 (H) 6160 (H)   Immunofixation ELP Unknown (Note)... (Note)...   Kappa Free Lt Chain Latest Ref Range: 0.33 - 1.94 mg/dL 178.75 (H) 215.75 (H)   Kappa Lambda Ratio Latest Ref Range: 0.26 - 1.65  156.80 (H) 342.46 (H)   Lambda Free Lt Chain Latest Ref Range: 0.57 - 2.63 mg/dL 1.14 0.63   Monoclonal Peak Latest Ref Range: 0.0 g/dL 3.4 (H) 3.4 (H)     ECOG PS: 1    ASSESSMENT:  This is a 89-year-old gentleman who has kappa light chain multiple myeloma with hypercellular marrow with 65% of cells are light chain restricted plasma cells.  He has severe pancytopenia and has needed a transfusion in the hospital.  He did not have hypercalcemia and his renal function was normal. He has a background of myelodysplastic syndrome on his bone marrow biopsy; however, majority of the cell population was monoclonal plasma cell population.   He was started on treatment due to cytopenia.   He is on weekly Velcade with dexamethasone 20 mg every week with every week labs and transfusion support at this time.   - Labs were reviewed with the patient today- his light  chain levels are getting worse. M spike is stable. Overall, I think we are loosing response to treatment. I discussed about potentially adding cyclophosphamide to the regimen. Due to his age I will favor starting at a lower dose of 300 mg every week. I discussed about potential risks and benefits and side effects. He is willing to proceed.   - He continue to be cytopenic and this likely reflects background of MDS. We could not get cytogenetics from bone marrow due to low cellularity. He may need a follow up bone marrow.     PLAN:   1.  Start cyclophosphamide 300 mg every week. Continue Velcade and dexamethasone,    2.  CBC, diff weekly- transfuse to keep hemoglobin of 8 and platelet of 10,000.   3.  RTC MD 4 weeks with labs on 5/8 SPEP, IFXN, light chains  4.  Continue Pete JON MD    4/12/2017

## 2017-04-12 NOTE — PATIENT INSTRUCTIONS
1.  Continue Velcade and dexamethasone, add cyclophosphamide 300 mg every week.   2.  CBC, diff weekly- transfuse to keep hemoglobin of 8 and platelet of 10,000.   3.  RTC MD 4 weeks with labs on 5/8 SPEP, IFXN, light chains  4.  Continue Zometa

## 2017-04-12 NOTE — PROGRESS NOTES
Infusion Nursing Note:  Roc Parkinson presents today for C5D1 Velcade/possible blood transfusion  Patient seen by provider today: Yes: exam with Dr. Quinn today   present during visit today: Not Applicable.    Note: Dr. Quinn gave ok for pt to have Velcade with platelet count below parameters. Pt will start in oral Cytoxan next week.     Intravenous Access:  Peripheral IV placed.    Treatment Conditions:  Lab Results   Component Value Date    HGB 7.6 04/12/2017     Lab Results   Component Value Date    WBC 3.0 04/12/2017      Lab Results   Component Value Date    ANEU 2.3 04/12/2017     Lab Results   Component Value Date    PLT 37 04/12/2017      Lab Results   Component Value Date     04/12/2017                   Lab Results   Component Value Date    POTASSIUM 5.3 04/12/2017           No results found for: MAG         Lab Results   Component Value Date    CR 0.94 04/12/2017                   Lab Results   Component Value Date    MICHELLE 9.2 04/12/2017                Lab Results   Component Value Date    BILITOTAL 0.4 04/12/2017           Lab Results   Component Value Date    ALBUMIN 2.7 04/12/2017                    Lab Results   Component Value Date    ALT 14 04/12/2017           Lab Results   Component Value Date    AST 10 04/12/2017     Results reviewed, labs MET treatment parameters, ok to proceed with treatment for transfusion of prbc's  Results reviewed, labs did NOT meet treatment parameters: for Velcade.Dr Quinn ordered to get Velcade today with platelet count below parameters.      Post Infusion Assessment:  Patient tolerated infusion without incident.  Blood return noted pre and post infusion.  Site patent and intact, free from redness, edema or discomfort.  No evidence of extravasations.  Access discontinued per protocol.    Discharge Plan:   Patient declined prescription refills.  Discharge instructions reviewed with: Patient.  Patient and/or family verbalized understanding of discharge  instructions and all questions answered.  Copy of AVS reviewed with patient and/or family.  Patient will return 4/19/17 for next appointment.  Patient discharged in stable condition accompanied by: self.  Departure Mode: Ambulatory.    KENDALL Edge RN

## 2017-04-12 NOTE — PROGRESS NOTES
"Oral Chemotherapy Monitoring Program    Primary Oncologist: Kai  Primary Oncology Clinic: [facility]  Cancer Diagnosis: Multiple Myeloma    Drug: Cytoxan  Start Date: 4/19/17  Dose is appropriate for patients: BSA, Renal Function   Expected duration of therapy: Until disease progression or unacceptable toxicity    Drug Interaction Assessment: No significant drug interactions identified.    Lab Monitoring Plan  C1D1+   CMP, CBC C2D1+ Call, CMP, CBC C3D1+ Call, CMP, CBC C4D1+ Call, CMP, CBC C5D1+ Call, CMP, CBC C6D1+ Call, CMP, CBC   C1D8+  C2D8+  C3D8+  C4D8+  C5D8+  C6D8+    C1D15+ Call C2D15+  C3D15+  C4D15+  C5D15+  C6D15+    C1D22+  C2D22+  C3D22+  C4D22+  C5D22+  C6D22+      Subjective/Objective:  Roc Parkinson is a 89 year old male seen in clinic for an initial visit for oral chemotherapy education.  Patient is scheduled to start Cytoxan next week.    ORAL CHEMOTHERAPY 4/12/2017   Drug Name Cytoxan (Cyclophsphamide)   Current Dosage 300mg   Current Schedule Weekly   Cycle Details Continuous   Planned next cycle start date 4/19/2017   Any new drug interactions? No   Is the dose as ordered appropriate for the patient? Yes       Last PHQ-2 Score on record:   PHQ-2 ( 1999 [employer]) 3/20/2017 1/30/2017   Q1: Little interest or pleasure in doing things 0 0   Q2: Feeling down, depressed or hopeless 0 0   PHQ-2 Score 0 0           Vitals:  BP:   BP Readings from Last 1 Encounters:   04/12/17 121/57     Wt Readings from Last 1 Encounters:   04/12/17 88.5 kg (195 lb 3.2 oz)     Estimated body surface area is 2.09 meters squared as calculated from the following:    Height as of an earlier encounter on 4/12/17: 1.778 m (5' 10\").    Weight as of an earlier encounter on 4/12/17: 88.5 kg (195 lb 3.2 oz).      Labs:  Lab Results   Component Value Date     04/12/2017      Lab Results   Component Value Date    POTASSIUM 5.3 04/12/2017     Lab Results   Component Value Date    MICHELLE 9.2 04/12/2017     No results found for: " MAG  No results found for: PHOS  Lab Results   Component Value Date    ALBUMIN 2.7 04/12/2017     Lab Results   Component Value Date    BUN 39 04/12/2017     Lab Results   Component Value Date    CR 0.94 04/12/2017       Lab Results   Component Value Date    AST 10 04/12/2017     Lab Results   Component Value Date    ALT 14 04/12/2017     Lab Results   Component Value Date    BILITOTAL 0.4 04/12/2017       Lab Results   Component Value Date    WBC 3.0 04/12/2017     Lab Results   Component Value Date    HGB 7.6 04/12/2017     Lab Results   Component Value Date    PLT 37 04/12/2017     Lab Results   Component Value Date    ANEU 2.3 04/12/2017           Assessment:  Patient is appropriate to start therapy.    Plan:  Basic chemotherapy teaching was reviewed with the patient including indication, start date of therapy, dose, administration, adverse effects, missed doses, food and drug interactions, monitoring, side effect management, office contact information, and safe handling. Written materials were provided and all questions answered.    Follow-Up:  Will follow approximately 1 week after starting therapy.     Alvarez May PharmD.

## 2017-04-12 NOTE — PROGRESS NOTES
"Roc Parkinson is a 89 year old male who presents for:  Chief Complaint   Patient presents with     Oncology Clinic Visit     Multiple Myeloma        Initial Vitals:  /57 (BP Location: Left arm)  Pulse 70  Temp 98.4  F (36.9  C) (Oral)  Resp 16  Ht 1.778 m (5' 10\")  Wt 88.5 kg (195 lb 3.2 oz)  BMI 28.01 kg/m2 Estimated body mass index is 28.01 kg/(m^2) as calculated from the following:    Height as of this encounter: 1.778 m (5' 10\").    Weight as of this encounter: 88.5 kg (195 lb 3.2 oz).. Body surface area is 2.09 meters squared. BP completed using cuff size: regular  No Pain (0) No LMP for male patient. Allergies and medications reviewed.     Medications: Medication refills not needed today.  Pharmacy name entered into [employer]: [facility] [address] [place] [address] [address] AT [facility] [address]    Comments: none    10 minutes for nursing intake (face to face time)   Yolanda Marsh MA    DISCHARGE PLAN:  Next appointments: See patient instruction section-- Lavinia  Departure Mode: Ambulatory  Accompanied by: RN to Infusion with RN-to-RN report given to Keya ARGUETA--OK per  to give Pt Velcade today with Plts: 37K.  Ok to start po Cytoxan same day as next Velcade for simplicity of Pt to remember.  10 minutes for nursing discharge (face to face time)   Bere Pace RN    1.  Continue Velcade and dexamethasone, add cyclophosphamide (cytoxan) 300 mg po every week.--Hanna [facility] educated Pt about Cytoxan & his Velcade & Dex.   Pharmacy Liaison Patricia is assisting Pt with obtaining his Cytoxan as well as financial coverage options.  2.  CBC, diff weekly- transfuse to keep hemoglobin of 8 and platelet of 10,000. --pRBC 1 unit today.  3.  RTC MD 4 weeks with labs on 5/3 SPEP, IFXN, light chains  4.  Continue Zometa      4/19/2017 Wed  9:00 AM  9:00 A 300 [facility] [[id number]]  INFUSION CHAIR 6 [[id number]] LEVEL 5 [667] C5D8 Geovany, Zometa, Labs/Possible " Transfusion       4/26/2017 Wed  9:30 AM  9:30 A 300 [facility] [[id number]] SH INFUSION CHAIR 4 [[id number]] LEVEL 5 [667] C5D15 Velcade, Labs/Possible Transfusion       5/3/2017 Wed  9:30 AM  9:30 A 300 [facility] [[id number]] SH INFUSION CHAIR 18 [[id number]] LEVEL 5 [667] C5D22 Velcade, Labs/Possible Transfusion       5/10/2017 Wed  9:00 AM  9:00 A 300 [facility] [[id number]] SH INFUSION CHAIR 15 [[id number]] LEVEL 5 [667] C6D1 Velcade, Labs/Possible Transfusion       5/10/2017 Wed  9:45 AM  9:45 A 15 [facility] [[id number]] ALISON JON [[id number]] RETURN [657] Return Multiple Myeloma      S 5/17/2017 Wed 10:00 AM 10:00 A 300 [facility] [[id number]] SH INFUSION CHAIR 17 [[id number]] LEVEL 5 [667] C6D8 Velcade,Zometa, Labs/Possible Transfusion      S 5/24/2017 Wed  9:30 AM  9:30 A 300 [facility] [[id number]] SH INFUSION CHAIR 6 [[id number]] LEVEL 5 [667] C6D15 Velcade, Labs/Possible Transfusion      S 5/31/2017 Wed 10:00 AM 10:00 A 300 [facility] [[id number]] SH INFUSION CHAIR 13 [[id number]] LEVEL 5 [667] C6D22 Velcade, Labs/Possible Transfusion

## 2017-04-12 NOTE — MR AVS SNAPSHOT
After Visit Summary   4/12/2017    Roc Parkinson    MRN: [id number]           Patient Information     Date Of Birth          7/7/1926        Visit Information        Provider Department      4/12/2017 9:00 AM  INFUSION CHAIR 7 [facility]        Today's Diagnoses     Multiple myeloma not having achieved remission (H)    -  1    Macrocytic anemia           Follow-ups after your visit        Your next 10 appointments already scheduled     Apr 17, 2017 10:30 AM CDT   SHORT with Dickson Reich MD   [facility] ([facility])    [address] [place] [address]   [phone]            Apr 18, 2017 12:30 PM CDT   Diabetic Education with  DIABETIC ED RESOURCE   [facility] ([facility])    [address] [address]   [phone]            Apr 19, 2017  9:00 AM CDT   Level 5 with  INFUSION CHAIR 6   [facility] ([facility])    [facility]  [address]  [place] [address]   [phone]            Apr 26, 2017  9:30 AM CDT   Level 5 with  INFUSION CHAIR 4   [facility] ([facility])    [facility]  [address]  [place] [address]   [phone]            May 03, 2017  9:30 AM CDT   Level 5 with  INFUSION CHAIR 18   [facility] ([facility])    [facility]  [address]  [place] [address]   [phone]            May 10, 2017  9:00 AM CDT   Level 5 with  INFUSION CHAIR 15   [facility] ([facility])    [facility]  [address]  [place] [address]   [phone]             May 10, 2017  9:45 AM CDT   Return Visit with Gretel Quinn MD   [facility] ([facility])    [facility]  [address]  [place] [address]   [phone]            May 17, 2017 10:00 AM CDT   Level 5 with SH INFUSION CHAIR 17   [facility] ([facility])    [facility]  [address]  [place] [address]   [phone]            May 24, 2017  9:30 AM CDT   Level 5 with SH INFUSION CHAIR 6   [facility] ([facility])    [facility] [place]  [address]  [place] [address]   [phone]            May 31, 2017 10:00 AM CDT   Level 5 with SH INFUSION CHAIR 13   [facility] ([facility])    [facility] [place] [id number]  [place] [address]   [phone]              Future tests that were ordered for you today     Open Standing Orders        Priority Remaining Interval Expires Ordered    Red blood cell prepare order unit Routine 99/100 CONDITIONAL (SPECIFY) BLOOD  4/12/2017    Transfuse red blood cell unit Routine 99/100 TRANSFUSE 1 UNIT  4/12/2017          Open Future Orders        Priority Expected Expires Ordered    Protein electrophoresis Routine 5/10/2017 1/12/2018 4/12/2017    [place] and lambda light chain Routine 5/10/2017 1/12/2018 4/12/2017    Protein electrophoresis timed urine Routine 5/10/2017 1/12/2018 4/12/2017            Who to contact     If you have questions or need follow up information about today's clinic visit or your schedule please contact [facility] directly at [phone].  Normal or non-critical lab and imaging results will be communicated to you by MyChart, letter or phone within 4 business days after the clinic has received the results. If you do not hear from us within 7  "days, please contact the clinic through [employer] or phone. If you have a critical or abnormal lab result, we will notify you by phone as soon as possible.  Submit refill requests through [employer] or call your pharmacy and they will forward the refill request to us. Please allow 3 business days for your refill to be completed.          Additional Information About Your Visit        ClicData Information     [employer] lets you send messages to your doctor, view your test results, renew your prescriptions, schedule appointments and more. To sign up, go to [email or web address] . Click on \"Log in\" on the left side of the screen, which will take you to the Welcome page. Then click on \"Sign up Now\" on the right side of the page.     You will be asked to enter the access code listed below, as well as some personal information. Please follow the directions to create your username and password.     Your access code is: [email or web address]  Expires: 2017 12:48 PM     Your access code will  in 90 days. If you need help or a new code, please call your [facility] clinic or [phone].        Care EveryWhere ID     This is your Care EveryWhere ID. This could be used by other organizations to access your [facility] medical records  [id number]        Your Vitals Were     Pulse Temperature Respirations Height BMI (Body Mass Index)       70 98.3  F (36.8  C) (Oral) 16 1.778 m (5' 10\") 28.01 kg/m2        Blood Pressure from Last 3 Encounters:   17 121/57   17 168/81   17 148/69    Weight from Last 3 Encounters:   17 88.5 kg (195 lb 3.2 oz)   17 88.5 kg (195 lb 3.2 oz)   17 86.5 kg (190 lb 11.2 oz)              We Performed the Following     ABO/Rh type and screen     Blood component     Blood component     CBC with platelets differential     Comprehensive metabolic panel     Red blood cell prepare order unit     Transfuse red blood cell unit     Treatment Conditions          Today's Medication " Changes          These changes are accurate as of: 4/12/17  1:28 PM.  If you have any questions, ask your nurse or doctor.               These medicines have changed or have updated prescriptions.        Dose/Directions    * dexamethasone 4 MG tablet   Commonly known as:  DECADRON   This may have changed:  Another medication with the same name was added. Make sure you understand how and when to take each.   Used for:  Multiple myeloma not having achieved remission (H)        Dose:  40 mg   Take 10 tablets (40 mg) by mouth every 7 days for 4 doses Take daily on Days 1, 8, 15, and 22. Cycles 3 and 4 ONLY.   Quantity:  40 tablet   Refills:  0       * dexamethasone 4 MG tablet   Commonly known as:  DECADRON   This may have changed:  You were already taking a medication with the same name, and this prescription was added. Make sure you understand how and when to take each.   Used for:  Multiple myeloma not having achieved remission (H)        Dose:  40 mg   Take 10 tablets (40 mg) by mouth every 7 days for 4 doses Take daily on Days 1, 8, 15, and 22. Cycles 3 and 4 ONLY.   Quantity:  40 tablet   Refills:  0       * Notice:  This list has 2 medication(s) that are the same as other medications prescribed for you. Read the directions carefully, and ask your doctor or other care provider to review them with you.         Where to get your medicines      These medications were sent to [id number] - [facility] [address] AT [facility] [address], [facility] [address]    Hours:  24-hours Phone:  [phone]     dexamethasone 4 MG tablet                Primary Care Provider Office Phone # Fax #    Dickson Reich [phone]       [facility] [address]  [facility] [address]        Thank you!     Thank you for choosing [facility]  for your care. Our goal is always to provide you with excellent care.  Hearing back from our patients is one way we can continue to improve our services. Please take a few minutes to complete the written survey that you may receive in the mail after your visit with us. Thank you!             Your Updated Medication List - Protect others around you: Learn how to safely use, store and throw away your medicines at www.disposemymeds.org.          This list is accurate as of: 4/12/17  1:28 PM.  Always use your most recent med list.                   Brand Name Dispense Instructions for use    acyclovir 400 MG tablet    ZOVIRAX    60 tablet    Take 1 tablet (400 mg) by mouth 2 times daily Viral Prophylaxis.       amLODIPine 5 MG tablet    NORVASC    30 tablet    TAKE 1 TABLET BY MOUTH DAILY       * B-D U/F 31G X 8 MM   Generic drug:  insulin pen needle          * insulin pen needle 31G X 8 MM    B-D U/F    100 each    Use 5 pen needles daily or as directed.       clotrimazole-betamethasone cream    LOTRISONE    30 g    Apply topically to the head of the penis two times a day for up to 2 weeks.       * dexamethasone 4 MG tablet    DECADRON    40 tablet    Take 10 tablets (40 mg) by mouth every 7 days for 4 doses Take daily on Days 1, 8, 15, and 22. Cycles 3 and 4 ONLY.       * dexamethasone 4 MG tablet    DECADRON    40 tablet    Take 10 tablets (40 mg) by mouth every 7 days for 4 doses Take daily on Days 1, 8, 15, and 22. Cycles 3 and 4 ONLY.       ferrous sulfate 325 (65 FE) MG tablet    IRON     Take 325 mg by mouth daily (with breakfast)       FREESTYLE LITE test strip   Generic drug:  blood glucose monitoring     300 strip    USE TO TEST FOUR TIMES DAILY       gemfibrozil 600 MG tablet    LOPID    180 tablet    TAKE 1 TABLET BY MOUTH TWICE DAILY       glipiZIDE 10 MG tablet    GLUCOTROL    180 tablet    TAKE 1 TABLET BY MOUTH TWICE DAILY BEFORE A MEAL       insulin aspart 100 UNIT/ML injection    NovoLOG PEN    12 mL    Inject 1-5 Units Subcutaneous 4 times daily (with meals and nightly)  Before meal correction: 140-225 - take 1 unit  226-310 - take 2 units 311-395 - take 3 units 396-480 - take 4 units >481 - take 5 units Bedtime correction of: 200-299 give 1 unit 300-399 take 2 units 400 or greater take 3 units       insulin detemir 100 UNIT/ML injection    LEVEMIR FLEXPEN/FLEXTOUCH    15 mL    Inject 10 Units Subcutaneous every morning (before breakfast)       levothyroxine 25 MCG tablet    SYNTHROID/LEVOTHROID    90 tablet    TAKE 1 TABLET BY MOUTH EVERY DAY.       lisinopril 30 MG tablet    PRINIVIL,ZESTRIL    90 tablet    TAKE 1 TABLET BY MOUTH EVERY DAY       metoprolol 25 MG tablet    LOPRESSOR    180 tablet    TAKE 1 TABLET BY MOUTH TWICE DAILY       omeprazole 20 MG CR capsule    priLOSEC    90 capsule    TAKE 1 CAPSULE BY MOUTH EVERY DAY       ondansetron 8 MG tablet    ZOFRAN    10 tablet    Take 1 tablet (8 mg) by mouth every 8 hours as needed (Nausea/Vomiting)       polyethylene glycol powder    MIRALAX/GLYCOLAX         prochlorperazine 5 MG tablet    COMPAZINE    30 tablet    Take 1 tablet (5 mg) by mouth every 6 hours as needed (Nausea/Vomiting)       Selenium 200 MCG Tabs      Take 100 mcg by mouth daily. Pt takes one half tab of the 200 mcg daily       tamsulosin 0.4 MG capsule    FLOMAX    90 capsule    TAKE 1 CAPSULE BY MOUTH DAILY       VITAMIN C PO      Take 1,000 mg by mouth daily       VITAMIN D3 PO      Take 1,000 Units by mouth daily       * Notice:  This list has 4 medication(s) that are the same as other medications prescribed for you. Read the directions carefully, and ask your doctor or other care provider to review them with you.

## 2017-04-12 NOTE — MR AVS SNAPSHOT
After Visit Summary   4/12/2017    Roc Parkinson    MRN: [id number]           Patient Information     Date Of Birth          7/7/1926        Visit Information        Provider Department      4/12/2017 9:30 AM Gretle Quinn MD [facility]        Today's Diagnoses     Multiple myeloma not having achieved remission (H)    -  1      Care Instructions    1.  Continue Velcade and dexamethasone, add cyclophosphamide 300 mg every week.   2.  CBC, diff weekly- transfuse to keep hemoglobin of 8 and platelet of 10,000.   3.  RTC MD 4 weeks with labs on 5/8 SPEP, IFXN, light chains  4.  Continue Zometa        Follow-ups after your visit        Your next 10 appointments already scheduled     Apr 17, 2017 10:30 AM CDT   SHORT with Dickson Reich MD   [facility] ([facility]    [address] [place] [address]   [phone]            Apr 18, 2017 12:30 PM CDT   Diabetic Education with  DIABETIC ED RESOURCE   [facility]    [address] [address]   [phone]            Apr 19, 2017  9:00 AM CDT   Level 5 with  INFUSION CHAIR 6   [facility] ([facility])    [facility]  [address]  [place] [address]   [phone]            Apr 26, 2017  9:30 AM CDT   Level 5 with  INFUSION CHAIR 4   [facility] ([facility])    [facility]  [address]  [place] [address]   [phone]            May 03, 2017  9:30 AM CDT   Level 5 with SH INFUSION CHAIR 18   [facility] ([facility])    [facility]  [address]  [place] [address]   [phone]            May 10, 2017  9:00 AM  CDT   Level 5 with SH INFUSION CHAIR 15   [facility] ([facility])    [facility] [place]  [address]  [place] [address]   [phone]            May 17, 2017 10:00 AM CDT   Level 5 with SH INFUSION CHAIR 17   [facility] ([facility])    [facility] [place]  [address]  [place] [address]   [phone]            May 24, 2017  9:30 AM CDT   Level 5 with SH INFUSION CHAIR 6   [facility] ([facility])    [facility] [place]  [address]  [place] [address]   [phone]            May 31, 2017 10:00 AM CDT   Level 5 with SH INFUSION CHAIR 13   [facility] ([facility])    [facility] [place]  [address]  [place] [address]   [phone]              Future tests that were ordered for you today     Open Standing Orders        Priority Remaining Interval Expires Ordered    Red blood cell prepare order unit Routine 99/100 CONDITIONAL (SPECIFY) BLOOD  4/12/2017    Transfuse red blood cell unit Routine 99/100 TRANSFUSE 1 UNIT  4/12/2017          Open Future Orders        Priority Expected Expires Ordered    Protein electrophoresis Routine 5/10/2017 1/12/2018 4/12/2017    [place]ib[place] and lambda light chain Routine 5/10/2017 1/12/2018 4/12/2017    Protein electrophoresis timed urine Routine 5/10/2017 1/12/2018 4/12/2017            Who to contact     If you have questions or need follow up information about today's clinic visit or your schedule please contact [facility] directly at [phone].  Normal or non-critical lab and imaging results will be communicated to you by MyChart, letter or phone within 4 business days after the clinic has received the results. If you do not hear from us within 7 days, please contact  "the clinic through [employer] or phone. If you have a critical or abnormal lab result, we will notify you by phone as soon as possible.  Submit refill requests through [employer] or call your pharmacy and they will forward the refill request to us. Please allow 3 business days for your refill to be completed.          Additional Information About Your Visit        OR[employer] Information     [employer] lets you send messages to your doctor, view your test results, renew your prescriptions, schedule appointments and more. To sign up, go to [email or web address] . Click on \"Log in\" on the left side of the screen, which will take you to the Welcome page. Then click on \"Sign up Now\" on the right side of the page.     You will be asked to enter the access code listed below, as well as some personal information. Please follow the directions to create your username and password.     Your access code is: [email or web address]  Expires: 2017 12:48 PM     Your access code will  in 90 days. If you need help or a new code, please call your [facility] clinic or [phone].        Care EveryWhere ID     This is your Care EveryWhere ID. This could be used by other organizations to access your [facility] medical records  [id number]        Your Vitals Were     Pulse Temperature Respirations Height BMI (Body Mass Index)       70 98.4  F (36.9  C) (Oral) 16 1.778 m (5' 10\") 28.01 kg/m2        Blood Pressure from Last 3 Encounters:   17 121/57   17 147/75   17 148/69    Weight from Last 3 Encounters:   17 88.5 kg (195 lb 3.2 oz)   17 88.5 kg (195 lb 3.2 oz)   17 86.5 kg (190 lb 11.2 oz)                 Today's Medication Changes          These changes are accurate as of: 17  1:11 PM.  If you have any questions, ask your nurse or doctor.               These medicines have changed or have updated prescriptions.        Dose/Directions    * dexamethasone 4 MG tablet   Commonly known as:  DECADRON   This may " have changed:  Another medication with the same name was added. Make sure you understand how and when to take each.   Used for:  Multiple myeloma not having achieved remission (H)        Dose:  40 mg   Take 10 tablets (40 mg) by mouth every 7 days for 4 doses Take daily on Days 1, 8, 15, and 22. Cycles 3 and 4 ONLY.   Quantity:  40 tablet   Refills:  0       * dexamethasone 4 MG tablet   Commonly known as:  DECADRON   This may have changed:  You were already taking a medication with the same name, and this prescription was added. Make sure you understand how and when to take each.   Used for:  Multiple myeloma not having achieved remission (H)        Dose:  40 mg   Take 10 tablets (40 mg) by mouth every 7 days for 4 doses Take daily on Days 1, 8, 15, and 22. Cycles 3 and 4 ONLY.   Quantity:  40 tablet   Refills:  0       * Notice:  This list has 2 medication(s) that are the same as other medications prescribed for you. Read the directions carefully, and ask your doctor or other care provider to review them with you.         Where to get your medicines      These medications were sent to [facility] [id number] [place] [address] [address] AT [facility] [address]  [address], [facility] [address]    Hours:  24-hours Phone:  [phone]     dexamethasone 4 MG tablet                Primary Care Provider Office Phone # Fax #    Dickson Reich [phone]       [facility] [address]  [facility] [address]        Thank you!     Thank you for choosing [facility]  for your care. Our goal is always to provide you with excellent care. Hearing back from our patients is one way we can continue to improve our services. Please take a few minutes to complete the written survey that you may receive in the mail after your visit with us. Thank you!             Your Updated Medication List - Protect others around you: Learn how to safely use, store and  throw away your medicines at www.disposemymeds.org.          This list is accurate as of: 4/12/17  1:11 PM.  Always use your most recent med list.                   Brand Name Dispense Instructions for use    acyclovir 400 MG tablet    ZOVIRAX    60 tablet    Take 1 tablet (400 mg) by mouth 2 times daily Viral Prophylaxis.       amLODIPine 5 MG tablet    NORVASC    30 tablet    TAKE 1 TABLET BY MOUTH DAILY       * B-D U/F 31G X 8 MM   Generic drug:  insulin pen needle          * insulin pen needle 31G X 8 MM    B-D U/F    100 each    Use 5 pen needles daily or as directed.       clotrimazole-betamethasone cream    LOTRISONE    30 g    Apply topically to the head of the penis two times a day for up to 2 weeks.       * dexamethasone 4 MG tablet    DECADRON    40 tablet    Take 10 tablets (40 mg) by mouth every 7 days for 4 doses Take daily on Days 1, 8, 15, and 22. Cycles 3 and 4 ONLY.       * dexamethasone 4 MG tablet    DECADRON    40 tablet    Take 10 tablets (40 mg) by mouth every 7 days for 4 doses Take daily on Days 1, 8, 15, and 22. Cycles 3 and 4 ONLY.       ferrous sulfate 325 (65 FE) MG tablet    IRON     Take 325 mg by mouth daily (with breakfast)       FREESTYLE LITE test strip   Generic drug:  blood glucose monitoring     300 strip    USE TO TEST FOUR TIMES DAILY       gemfibrozil 600 MG tablet    LOPID    180 tablet    TAKE 1 TABLET BY MOUTH TWICE DAILY       glipiZIDE 10 MG tablet    GLUCOTROL    180 tablet    TAKE 1 TABLET BY MOUTH TWICE DAILY BEFORE A MEAL       insulin aspart 100 UNIT/ML injection    NovoLOG PEN    12 mL    Inject 1-5 Units Subcutaneous 4 times daily (with meals and nightly) Before meal correction: 140-225 - take 1 unit  226-310 - take 2 units 311-395 - take 3 units 396-480 - take 4 units >481 - take 5 units Bedtime correction of: 200-299 give 1 unit 300-399 take 2 units 400 or greater take 3 units       insulin detemir 100 UNIT/ML injection    LEVEMIR FLEXPEN/FLEXTOUCH    15 mL     Inject 10 Units Subcutaneous every morning (before breakfast)       levothyroxine 25 MCG tablet    SYNTHROID/LEVOTHROID    90 tablet    TAKE 1 TABLET BY MOUTH EVERY DAY.       lisinopril 30 MG tablet    PRINIVIL,ZESTRIL    90 tablet    TAKE 1 TABLET BY MOUTH EVERY DAY       metoprolol 25 MG tablet    LOPRESSOR    180 tablet    TAKE 1 TABLET BY MOUTH TWICE DAILY       omeprazole 20 MG CR capsule    priLOSEC    90 capsule    TAKE 1 CAPSULE BY MOUTH EVERY DAY       ondansetron 8 MG tablet    ZOFRAN    10 tablet    Take 1 tablet (8 mg) by mouth every 8 hours as needed (Nausea/Vomiting)       polyethylene glycol powder    MIRALAX/GLYCOLAX         prochlorperazine 5 MG tablet    COMPAZINE    30 tablet    Take 1 tablet (5 mg) by mouth every 6 hours as needed (Nausea/Vomiting)       Selenium 200 MCG Tabs      Take 100 mcg by mouth daily. Pt takes one half tab of the 200 mcg daily       tamsulosin 0.4 MG capsule    FLOMAX    90 capsule    TAKE 1 CAPSULE BY MOUTH DAILY       VITAMIN C PO      Take 1,000 mg by mouth daily       VITAMIN D3 PO      Take 1,000 Units by mouth daily       * Notice:  This list has 4 medication(s) that are the same as other medications prescribed for you. Read the directions carefully, and ask your doctor or other care provider to review them with you.

## 2017-04-14 DIAGNOSIS — C90.00 MULTIPLE MYELOMA NOT HAVING ACHIEVED REMISSION (H): ICD-10-CM

## 2017-04-17 ENCOUNTER — OFFICE VISIT (OUTPATIENT)
Dept: FAMILY MEDICINE | Facility: CLINIC | Age: 82
End: 2017-04-17
Payer: COMMERCIAL

## 2017-04-17 VITALS
TEMPERATURE: 97.6 F | SYSTOLIC BLOOD PRESSURE: 124 MMHG | HEIGHT: 70 IN | HEART RATE: 68 BPM | WEIGHT: 191 LBS | DIASTOLIC BLOOD PRESSURE: 60 MMHG | BODY MASS INDEX: 27.35 KG/M2 | RESPIRATION RATE: 14 BRPM

## 2017-04-17 DIAGNOSIS — I10 ESSENTIAL HYPERTENSION, BENIGN: ICD-10-CM

## 2017-04-17 DIAGNOSIS — E11.21 TYPE 2 DIABETES MELLITUS WITH DIABETIC NEPHROPATHY, WITH LONG-TERM CURRENT USE OF INSULIN (H): Primary | ICD-10-CM

## 2017-04-17 DIAGNOSIS — Z79.4 TYPE 2 DIABETES MELLITUS WITH DIABETIC NEPHROPATHY, WITH LONG-TERM CURRENT USE OF INSULIN (H): Primary | ICD-10-CM

## 2017-04-17 DIAGNOSIS — C90.00 MULTIPLE MYELOMA NOT HAVING ACHIEVED REMISSION (H): ICD-10-CM

## 2017-04-17 DIAGNOSIS — N18.30 CKD (CHRONIC KIDNEY DISEASE) STAGE 3, GFR 30-59 ML/MIN (H): Chronic | ICD-10-CM

## 2017-04-17 PROCEDURE — 99213 OFFICE O/P EST LOW 20 MIN: CPT | Performed by: FAMILY MEDICINE

## 2017-04-17 NOTE — PROGRESS NOTES
SUBJECTIVE:                                                    Roc Parkinson is a 89 year old male who presents to clinic today for the following health issues:      Diabetes Follow-up    Patient is checking blood sugars: 4 times daily.    Blood sugar testing frequency justification: Uncontrolled diabetes  Results are as follows:         Average around 200    Diabetic concerns: blood sugar frequently over 200     Symptoms of hypoglycemia (low blood sugar): none     Paresthesias (numbness or burning in feet) or sores: No     Date of last diabetic eye exam: utd       Amount of exercise or physical activity: None    Problems taking medications regularly: No    Medication side effects: none    Diet: regular (no restrictions)          Problem list and histories reviewed & adjusted, as indicated.  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)     Irritable bowel syndrome     Vitamin D deficiency disease     Microalbuminuria     Obesity     UTI (urinary tract infection) w hx of recurrence     Iron deficiency anemia     Nonsmoker     A-fib (H)     Health Care Home     Hyperlipidemia     CKD (chronic kidney disease) stage 3, GFR 30-59 ml/min     Hypothyroidism     Long-term (current) use of anticoagulants [Z79.01]     Macrocytic anemia     GIB (gastrointestinal bleeding)     Multiple myeloma not having achieved remission (H)     Hypercalcemia     Hyperglycemia     Urinary tract infection     Hyperkalemia     ACP (advance care planning)     Past Surgical History:   Procedure Laterality Date     ARTHROPLASTY KNEE  11/5/2012    Procedure: ARTHROPLASTY KNEE;  RIGHT TOTAL KNEE ARTHROPLASTY (SMITH &  "NEPHEW)^;  Surgeon: Dickson Schulte MD;  Location:  OR     BIOPSY      bladder     COLONOSCOPY  2007     COLONOSCOPY N/A 11/15/2016    Procedure: COMBINED COLONOSCOPY, SINGLE OR MULTIPLE BIOPSY/POLYPECTOMY BY BIOPSY;  Surgeon: Kenneth Fulton MD;  Location:  GI     GENITOURINARY SURGERY      TURP x2 and bladder scraping     GI SURGERY      anal fistula     ORTHOPEDIC SURGERY      lt knee in nov.2009     SOFT TISSUE SURGERY      melanoma       Social History   Substance Use Topics     Smoking status: Never Smoker     Smokeless tobacco: Never Used     Alcohol use No     Family History   Problem Relation Age of Onset     Cardiovascular Father 81     heart arrythmia     Endocrine Disease Brother 74     Paget's           Reviewed and updated as needed this visit by clinical staff  Tobacco  Allergies  Meds  Med Hx  Surg Hx  Fam Hx  Soc Hx      Reviewed and updated as needed this visit by Provider         ROS:  Constitutional, neuro, ENT, endocrine, pulmonary, cardiac, gastrointestinal, genitourinary, musculoskeletal, integument and psychiatric systems are negative, except as otherwise noted.    OBJECTIVE:                                                    /60  Pulse 68  Temp 97.6  F (36.4  C) (Tympanic)  Resp 14  Ht 5' 10\" (1.778 m)  Wt 191 lb (86.6 kg)  BMI 27.41 kg/m2  Body mass index is 27.41 kg/(m^2).  GENERAL APPEARANCE: healthy, alert and no distress         ASSESSMENT/PLAN:                                                        ICD-10-CM    1. Type 2 diabetes mellitus with diabetic nephropathy, with long-term current use of insulin (H) E11.21     Z79.4    2. Essential hypertension, benign I10    3. Multiple myeloma not having achieved remission (H) C90.00    4. CKD (chronic kidney disease) stage 3, GFR 30-59 ml/min N18.3        There are no Patient Instructions on file for this visit.    Dickson Reich MD  [facility]    "

## 2017-04-17 NOTE — PATIENT INSTRUCTIONS
Patient is starting a new cancer medication for his multiple myeloma. His blood sugars are slowly trending down. He sees the CDE tomorrow and oncology on Wednesday. I will not change anything today and will see back in a couple of weeks.

## 2017-04-17 NOTE — MR AVS SNAPSHOT
After Visit Summary   4/17/2017    Roc Parkinson    MRN: [id number]           Patient Information     Date Of Birth          7/7/1926        Visit Information        Provider Department      4/17/2017 10:30 AM Dickson Reich MD [facility]        Today's Diagnoses     Type 2 diabetes mellitus with diabetic nephropathy, with long-term current use of insulin (H)    -  1    Essential hypertension, benign        Multiple myeloma not having achieved remission (H)        CKD (chronic kidney disease) stage 3, GFR 30-59 ml/min          Care Instructions    Patient is starting a new cancer medication for his multiple myeloma. His blood sugars are slowly trending down. He sees the CDE tomorrow and oncology on Wednesday. I will not change anything today and will see back in a couple of weeks.        Follow-ups after your visit        Follow-up notes from your care team     Return in about 2 weeks (around 5/1/2017) for Routine Visit, Lab Work.      Your next 10 appointments already scheduled     Apr 18, 2017 12:30 PM CDT   Diabetic Education with  DIABETIC ED RESOURCE   [facility]    [address] [place] [address]   [phone]            Apr 19, 2017  9:00 AM CDT   Level 5 with  INFUSION CHAIR 6   [facility] ([facility])    [facility]  [address]  [facility] [address]   [phone]            Apr 26, 2017  9:30 AM CDT   Level 5 with  INFUSION CHAIR 4   [facility] ([facility])    [facility]  [address]  [facility] [address]   [phone]            May 01, 2017 10:30 AM CDT   SHORT with Dickson Reich MD   [facility] ([facility])    [address] [facility] [address]  [facility] [address]  [facility] [address]   [phone]            May 03, 2017  9:30 AM CDT   Level 5 with SH INFUSION CHAIR 18   [facility] ([facility])    [facility] [place]  [address]  [place] [address]   [phone]            May 10, 2017  9:00 AM CDT   Level 5 with SH INFUSION CHAIR 15   [facility] ([facility])    [facility] [place] [address]  [place] [address]   [phone]            May 10, 2017  9:45 AM CDT   Return Visit with Gretel Quinn MD   [facility])    [facility] [place]  [address]  [place] [address]   [phone]            May 17, 2017 10:00 AM CDT   Level 5 with SH INFUSION CHAIR 17   [facility] ([facility])    [facility] [place]  [address]  [place] [address]   [phone]            May 24, 2017  9:30 AM CDT   Level 5 with SH INFUSION CHAIR 6   [facility] ([facility])    [facility] [place]  [address]  [place] [address]   [phone]            May 31, 2017 10:00 AM CDT   Level 5 with SH INFUSION CHAIR 13   [facility] ([facility])    [facility]  [address]  [place] [address]   [phone]              Who to contact     If you have questions or need follow up information about today's clinic visit or your schedule please contact [facility] directly at [phone].  Normal or non-critical lab and imaging results will be communicated to you by MyChart, letter or phone within 4 business days after the clinic has received the results. If you do not hear from us within 7 days, please contact  "the clinic through [employer] or phone. If you have a critical or abnormal lab result, we will notify you by phone as soon as possible.  Submit refill requests through [employer] or call your pharmacy and they will forward the refill request to us. Please allow 3 business days for your refill to be completed.          Additional Information About Your Visit        [employer] Information     [employer] lets you send messages to your doctor, view your test results, renew your prescriptions, schedule appointments and more. To sign up, go to [email or web address] . Click on \"Log in\" on the left side of the screen, which will take you to the Welcome page. Then click on \"Sign up Now\" on the right side of the page.     You will be asked to enter the access code listed below, as well as some personal information. Please follow the directions to create your username and password.     Your access code is: [email or web address]  Expires: 2017 12:48 PM     Your access code will  in 90 days. If you need help or a new code, please call your [facility] clinic or [phone].        Care EveryWhere ID     This is your Care EveryWhere ID. This could be used by other organizations to access your [facility] medical records  [id number]        Your Vitals Were     Pulse Temperature Respirations Height BMI (Body Mass Index)       68 97.6  F (36.4  C) (Tympanic) 14 5' 10\" (1.778 m) 27.41 kg/m2        Blood Pressure from Last 3 Encounters:   17 124/60   17 121/57   17 168/81    Weight from Last 3 Encounters:   17 191 lb (86.6 kg)   17 195 lb 3.2 oz (88.5 kg)   17 195 lb 3.2 oz (88.5 kg)              Today, you had the following     No orders found for display       Primary Care Provider Office Phone # Fax #    Dickson Reich [phone]       [facility] [address]  [place] [address]        Thank you!     Thank you for choosing [facility]" [facility]  for your care. Our goal is always to provide you with excellent care. Hearing back from our patients is one way we can continue to improve our services. Please take a few minutes to complete the written survey that you may receive in the mail after your visit with us. Thank you!             Your Updated Medication List - Protect others around you: Learn how to safely use, store and throw away your medicines at www.disposemymeds.org.          This list is accurate as of: 4/17/17  3:24 PM.  Always use your most recent med list.                   Brand Name Dispense Instructions for use    acyclovir 400 MG tablet    ZOVIRAX    60 tablet    Take 1 tablet (400 mg) by mouth 2 times daily Viral Prophylaxis.       amLODIPine 5 MG tablet    NORVASC    30 tablet    TAKE 1 TABLET BY MOUTH DAILY       * B-D U/F 31G X 8 MM   Generic drug:  insulin pen needle          * insulin pen needle 31G X 8 MM    B-D U/F    100 each    Use 5 pen needles daily or as directed.       clotrimazole-betamethasone cream    LOTRISONE    30 g    Apply topically to the head of the penis two times a day for up to 2 weeks.       dexamethasone 4 MG tablet    DECADRON    40 tablet    Take 10 tablets (40 mg) by mouth every 7 days for 4 doses Take daily on Days 1, 8, 15, and 22. Cycles 3 and 4 ONLY.       ferrous sulfate 325 (65 FE) MG tablet    IRON     Take 325 mg by mouth daily (with breakfast)       FREESTYLE LITE test strip   Generic drug:  blood glucose monitoring     300 strip    USE TO TEST FOUR TIMES DAILY       gemfibrozil 600 MG tablet    LOPID    180 tablet    TAKE 1 TABLET BY MOUTH TWICE DAILY       glipiZIDE 10 MG tablet    GLUCOTROL    180 tablet    TAKE 1 TABLET BY MOUTH TWICE DAILY BEFORE A MEAL       insulin aspart 100 UNIT/ML injection    NovoLOG PEN    12 mL    Inject 1-5 Units Subcutaneous 4 times daily (with meals and nightly) Before meal correction: 140-225 - take 1 unit  226-310 - take 2 units 311-395 - take 3 units 396-480  - take 4 units >481 - take 5 units Bedtime correction of: 200-299 give 1 unit 300-399 take 2 units 400 or greater take 3 units       insulin detemir 100 UNIT/ML injection    LEVEMIR FLEXPEN/FLEXTOUCH    15 mL    Inject 10 Units Subcutaneous every morning (before breakfast)       levothyroxine 25 MCG tablet    SYNTHROID/LEVOTHROID    90 tablet    TAKE 1 TABLET BY MOUTH EVERY DAY.       lisinopril 30 MG tablet    PRINIVIL,ZESTRIL    90 tablet    TAKE 1 TABLET BY MOUTH EVERY DAY       metoprolol 25 MG tablet    LOPRESSOR    180 tablet    TAKE 1 TABLET BY MOUTH TWICE DAILY       omeprazole 20 MG CR capsule    priLOSEC    90 capsule    TAKE 1 CAPSULE BY MOUTH EVERY DAY       ondansetron 8 MG tablet    ZOFRAN    10 tablet    Take 1 tablet (8 mg) by mouth every 8 hours as needed (Nausea/Vomiting)       polyethylene glycol powder    MIRALAX/GLYCOLAX         prochlorperazine 5 MG tablet    COMPAZINE    30 tablet    Take 1 tablet (5 mg) by mouth every 6 hours as needed (Nausea/Vomiting)       Selenium 200 MCG Tabs      Take 100 mcg by mouth daily. Pt takes one half tab of the 200 mcg daily       tamsulosin 0.4 MG capsule    FLOMAX    90 capsule    TAKE 1 CAPSULE BY MOUTH DAILY       VITAMIN C PO      Take 1,000 mg by mouth daily       VITAMIN D3 PO      Take 1,000 Units by mouth daily       * Notice:  This list has 2 medication(s) that are the same as other medications prescribed for you. Read the directions carefully, and ask your doctor or other care provider to review them with you.

## 2017-04-18 ENCOUNTER — ALLIED HEALTH/NURSE VISIT (OUTPATIENT)
Dept: EDUCATION SERVICES | Facility: CLINIC | Age: 82
End: 2017-04-18
Payer: COMMERCIAL

## 2017-04-18 ENCOUNTER — DOCUMENTATION ONLY (OUTPATIENT)
Dept: ONCOLOGY | Facility: CLINIC | Age: 82
End: 2017-04-18

## 2017-04-18 DIAGNOSIS — C90.00 MULTIPLE MYELOMA NOT HAVING ACHIEVED REMISSION (H): Primary | ICD-10-CM

## 2017-04-18 DIAGNOSIS — E11.65 UNCONTROLLED TYPE 2 DIABETES MELLITUS WITH HYPERGLYCEMIA, UNSPECIFIED LONG TERM INSULIN USE STATUS: ICD-10-CM

## 2017-04-18 DIAGNOSIS — Z79.4 TYPE 2 DIABETES MELLITUS WITH DIABETIC NEPHROPATHY, WITH LONG-TERM CURRENT USE OF INSULIN (H): Primary | ICD-10-CM

## 2017-04-18 DIAGNOSIS — E11.21 TYPE 2 DIABETES MELLITUS WITH DIABETIC NEPHROPATHY, WITH LONG-TERM CURRENT USE OF INSULIN (H): Primary | ICD-10-CM

## 2017-04-18 PROCEDURE — G0108 DIAB MANAGE TRN  PER INDIV: HCPCS

## 2017-04-18 RX ORDER — CYCLOPHOSPHAMIDE 50 MG/1
300 CAPSULE ORAL WEEKLY
Qty: 24 CAPSULE | Refills: 0 | Status: SHIPPED | OUTPATIENT
Start: 2017-04-18 | End: 2017-01-01

## 2017-04-18 NOTE — PROGRESS NOTES
Diabetes Self Management Training: Follow-up Visit    Roc Parkinson presents today for education and evaluation of glucose control related to Type 2 diabetes.    He is accompanied by self    Patient's diabetes management related comments/concerns: Starting a new chemo and still having high blood sugars    Patient would like this visit to be focused around the following diabetes-related behaviors and goals: Taking Medication    ASSESSMENT:  Patient Problem List reviewed for relevant medical history and current medical status.    Current Diabetes Management per Patient:  Taking diabetes medications?   yes:     Diabetes Medication(s)     Insulin Sig    insulin detemir (LEVEMIR FLEXPEN/FLEXTOUCH) 100 UNIT/ML injection Inject 10 Units Subcutaneous every morning (before breakfast)    insulin aspart (NOVOLOG PEN) 100 UNIT/ML injection Inject 1-5 Units Subcutaneous 4 times daily (with meals and nightly) Before meal correction:   140-225 - take 1 unit    226-310 - take 2 units   311-395 - take 3 units   396-480 - take 4 units   >481 - take 5 units  Bedtime correction of:   200-299 give 1 unit   300-399 take 2 units   400 or greater take 3 units    Sulfonylureas Sig    glipiZIDE (GLUCOTROL) 10 MG tablet TAKE 1 TABLET BY MOUTH TWICE DAILY BEFORE A MEAL      **Starting a new chemo tomorrow per pt.     Patient glucose self monitoring as follows: four times daily.   BG results:   Date Breakfast  Lunch  Dinner  Bedtime    Before After Before After Before After    4/18 150  260       4/17 134  219  174  218   4/16 98  209  220  253   4/15 92  261  96  224   4/14 69  183  247  229   4/13 302  256  253  165   4/12 112    471  497        BG values are: improved from last visit but continue to be mostly high during the day.    Patient's most recent   Lab Results   Component Value Date    A1C 8.0 11/14/2016    is not meeting goal of <8.0    Nutrition:  Patient eats 3 meals per day and does not usually snack.     Breakfast - papaya  "juice (8 oz) with egg and toast OR cream of wheat OR cheerios  Lunch - papaya juice (8 oz) with soup OR half sandwich  Dinner - papaya juice (8 oz) with meat and vegetable  Snacks - not usually    Beverages: papaya juice, milk, tomato juice, water     Cultural/Moravian diet restrictions: No      Biggest Challenge to Healthy Eating: none    Physical Activity:    Not assessed    Diabetes Complications:  Acute Complications: At risk for hypoglycemia? yes  Symptoms of low blood sugar? weakness  Frequency of hypoglycemia: 1 since last visit -- pt found he probably over corrected when he was low to feel better    Vitals:  There were no vitals taken for this visit.  Estimated body mass index is 27.41 kg/(m^2) as calculated from the following:    Height as of 4/17/17: 1.778 m (5' 10\").    Weight as of 4/17/17: 86.6 kg (191 lb).     Last 3 BP:   BP Readings from Last 3 Encounters:   04/17/17 124/60   04/12/17 121/57   04/12/17 168/81       History   Smoking Status     Never Smoker   Smokeless Tobacco     Never Used       Labs:  Lab Results   Component Value Date    A1C 8.0 11/14/2016     Lab Results   Component Value Date     04/12/2017     Lab Results   Component Value Date    LDL 61 05/09/2016     HDL Cholesterol   Date Value Ref Range Status   05/09/2016 25 (L) >39 mg/dL Final   ]  GFR Estimate   Date Value Ref Range Status   04/12/2017 75 >60 mL/min/1.7m2 Final     Comment:     Non  GFR Calc     GFR Estimate If Black   Date Value Ref Range Status   04/12/2017 >90   GFR Calc   >60 mL/min/1.7m2 Final     Lab Results   Component Value Date    CR 0.94 04/12/2017     No results found for: MICROALBUMIN    Health Beliefs and Attitudes:   Patient Activation Measure Survey Score:  FANNY Score (Last Two) 10/14/2015 9/28/2016   FANNY Raw Score 40 29   Activation Score 60 52.9   FANNY Level 3 2       Stage of Change: ACTION (Actively working towards change)    Diabetes knowledge and skills " assessment:     Patient is knowledgeable in diabetes management concepts related to: Healthy Eating, Being Active, Monitoring, Taking Medication and Healthy Coping    Patient needs further education on the following diabetes management concepts: Healthy Eating, Taking Medication, Problem Solving and Reducing Risks    Barriers to Learning Assessment: No Barriers identified    Based on learning assessment above, most appropriate setting for further diabetes education would be: Individual setting.    INTERVENTION:  Pt continues to have elevated blood sugars during the day. As his current levemir and bedtime correction seem to pull his numbers down usually in the morning, would recommend to add a base dose of Novolog with his meals to provide better coverage when he is running the highest.     Education provided today on:  AADE Self-Care Behaviors:  Monitoring: log and interpret results, individual blood glucose targets and frequency of monitoring.  Problem Solving: low blood glucose - causes, signs/symptoms, treatment and prevention and carrying a carbohydrate source at all times    Opportunities for ongoing education and support in diabetes-self management were discussed.    Pt verbalized understanding of concepts discussed and recommendations provided today.       Education Materials Provided:  Hypoglycemia Signs and Symptoms    PLAN:  Meal Plan Recommendation: eat 3 meals a day and include protein rich foods at each meal.   Check blood sugars fasting, before meals and 2 hours after the start of meals (breakfast, lunch and supper) and bedtime   Keep a blood glucose record for next visit.  Continue 10 units Levemir in the morning.   Recommend that patient begin base dose of 1 u Novolog with each meal (TID) + correction (continue current correction scale as outlined in med list).  Keep bedtime correction the same.  Recommend current Hgb A1c to be measured.   AVS printed and provided to patient  today.    FOLLOW-UP:  Follow-up appointment scheduled on May 1, 2017.  Chart routed to referring provider.    Ongoing plan for education and support: Written resources (magazines, books, etc.) and Follow-up visit with diabetes educator in 2 weeks    YAZMIN Salguero (Gray) CDE    Time Spent: 25 minutes  Encounter Type: Individual    Any diabetes medication dose changes were made via the CDE Protocol and Collaborative Practice Agreement with the patient's referring provider. A copy of this encounter was shared with the provider.

## 2017-04-18 NOTE — PROGRESS NOTES
"Prior Authorization Approval    Authorization Effective Date:  04/19/2017  Authorization Expiration Date:  12/31/2039  Medication: cyclophosphamide 50mg  Approved Dose/Quantity: not indicated on approval  Reference #:   [id number]  Insurance Company:  [employer]-med[employer]are part B  Expected CoPay:  20%, ~72.00/month     CoPay Card Available:    no  Foundation Assistance Needed:  Not at this time, if needed [facility] has an opening for pts DX  Which Pharmacy is filling the prescription (Not needed for infusion/clinic administered):  [facility] pharmacy  Pharmacy Notified:  Yes, they have filled this today 4/19 under patients Part B.  Patient Notified:  Yes, he will pick this up today and start tomorrow morning 4/20          Prior Authorization Retail Medication Request  Medication/Dose: cyclophosphamide capsule  Diagnosis and ICD code:     Multiple myeloma not having achieved remission (H) [C90.00]       New/Renewal/Insurance Change PA: new--initially approved under part D but I called insurance back to clarify this and it should indeed be covered by part B (medical)  Previously Tried and Failed Therapies: n/a    Insurance ID (if provided): [id number]  Insurance Phone (if provided): [employer] @ [phone]    Any additional info from fax request: Called [facility] ciara initially and the represenative asked if the cytoxan was used as an IV or oral and i stated oral so they covered it under the patients part D. This did not seem accurate to me since cytoxan is covered by part B for cancer indications.    I called them back today to clarify this and they stated they will re-open the case. The question should have been stated\" is the cytoxan being used for the same indication as the injection formulation\". The answer is YES and thus the medication is covered under part B. The represensative Keya stated she will send this to her clinical team and they will review it. Should have an answer sometime tomorrow (4/19)    We are " filling the medication at [facility] pharmacy and will await on the insurance's decision.    If you received a fax notification from an outside Pharmacy:  Pharmacy Name:[facility] pharmacy  Pharmacy #:[phone]

## 2017-04-18 NOTE — Clinical Note
FYI - added 1 u of Novolog to each meal (TID) for additional daytime coverage of his high BG's.  No change to his correction or levemir at this time. Will see him again in 2 weeks.   Kateryna Quiñonez) Devon, RD LD CDE

## 2017-04-18 NOTE — MR AVS SNAPSHOT
After Visit Summary   4/18/2017    Roc Parkinson    MRN: [id number]           Patient Information     Date Of Birth          7/7/1926        Visit Information        Provider Department      4/18/2017 12:30 PM  DIABETIC ED RESOURCE [facility]        Today's Diagnoses     Type 2 diabetes mellitus with diabetic nephropathy, with long-term current use of insulin (H)    -  1       Follow-ups after your visit        Your next 10 appointments already scheduled     Apr 19, 2017  9:00 AM CDT   Level 5 with SH INFUSION CHAIR 6   [facility] ([facility])    [facility]  [address]  [place] [address]   [phone]            Apr 26, 2017  9:30 AM CDT   Level 5 with SH INFUSION CHAIR 4   [facility] ([facility])    [facility] [place]  [address]  [place] [address]   [phone]            May 01, 2017 10:30 AM CDT   SHORT with Dickson Reich MD   [facility] ([facility]    [address] [place] [address]   [phone]            May 01, 2017 12:30 PM CDT   Diabetic Education with  DIABETIC ED RESOURCE   [facility] ([facility])    [address] [id number] [phone]            May 03, 2017  9:30 AM CDT   Level 5 with SH INFUSION CHAIR 18   [facility] ([facility])    [facility] [place]  [address]  [place] [address]   [phone]            May 10, 2017  9:00 AM CDT   Level 5 with SH INFUSION CHAIR 15   [facility] ([facility])    [facility] [place]  [address]  [place] [address]    [phone]            May 10, 2017  9:45 AM CDT   Return Visit with Gretel Quinn MD   [facility] ([facility])    [facility] [place]  [address]  [place] [address]   [phone]            May 17, 2017 10:00 AM CDT   Level 5 with SH INFUSION CHAIR 17   [facility] ([facility])    [facility] [place]  [address]  [place] [address]   [phone]            May 24, 2017  9:30 AM CDT   Level 5 with SH INFUSION CHAIR 6   [facility] ([facility])    [facility] [place]  [address]  [place] [address]   [phone]            May 31, 2017 10:00 AM CDT   Level 5 with SH INFUSION CHAIR 13   [facility] ([facility])    [facility] [place]  [address]  [place] [address]   [phone]              Who to contact     If you have questions or need follow up information about today's clinic visit or your schedule please contact [facility] directly at [phone].  Normal or non-critical lab and imaging results will be communicated to you by Stellarrayhart, letter or phone within 4 business days after the clinic has received the results. If you do not hear from us within 7 days, please contact the clinic through Stellarrayhart or phone. If you have a critical or abnormal lab result, we will notify you by phone as soon as possible.  Submit refill requests through adSage or call your pharmacy and they will forward the refill request to us. Please allow 3 business days for your refill to be completed.          Additional Information About Your Visit        adSage Information     adSage lets you send messages to your doctor, view your test results, renew your prescriptions, schedule appointments and more. To sign up, go to  [email or web address] . Click on \"Log in\" on the left side of the screen, which will take you to the Welcome page. Then click on \"Sign up Now\" on the right side of the page.     You will be asked to enter the access code listed below, as well as some personal information. Please follow the directions to create your username and password.     Your access code is: [email or web address]  Expires: 2017 12:48 PM     Your access code will  in 90 days. If you need help or a new code, please call your [facility] clinic or [phone].        Care EveryWhere ID     This is your Care EveryWhere ID. This could be used by other organizations to access your [facility] medical records  [id number]         Blood Pressure from Last 3 Encounters:   17 124/60   17 121/57   17 168/81    Weight from Last 3 Encounters:   17 86.6 kg (191 lb)   17 88.5 kg (195 lb 3.2 oz)   17 88.5 kg (195 lb 3.2 oz)              Today, you had the following     No orders found for display       Primary Care Provider Office Phone # Fax #    Dickson Reich [phone]       [facility] [address] [facility] [address]        Thank you!     Thank you for choosing [facility]  for your care. Our goal is always to provide you with excellent care. Hearing back from our patients is one way we can continue to improve our services. Please take a few minutes to complete the written survey that you may receive in the mail after your visit with us. Thank you!             Your Updated Medication List - Protect others around you: Learn how to safely use, store and throw away your medicines at www.disposemymeds.org.          This list is accurate as of: 17  1:01 PM.  Always use your most recent med list.                   Brand Name Dispense Instructions for use    acyclovir 400 MG tablet    ZOVIRAX    60 tablet    Take 1 tablet (400 mg) by mouth 2 times daily " Viral Prophylaxis.       amLODIPine 5 MG tablet    NORVASC    30 tablet    TAKE 1 TABLET BY MOUTH DAILY       * B-D U/F 31G X 8 MM   Generic drug:  insulin pen needle          * insulin pen needle 31G X 8 MM    B-D U/F    100 each    Use 5 pen needles daily or as directed.       clotrimazole-betamethasone cream    LOTRISONE    30 g    Apply topically to the head of the penis two times a day for up to 2 weeks.       dexamethasone 4 MG tablet    DECADRON    40 tablet    Take 10 tablets (40 mg) by mouth every 7 days for 4 doses Take daily on Days 1, 8, 15, and 22. Cycles 3 and 4 ONLY.       ferrous sulfate 325 (65 FE) MG tablet    IRON     Take 325 mg by mouth daily (with breakfast)       FREESTYLE LITE test strip   Generic drug:  blood glucose monitoring     300 strip    USE TO TEST FOUR TIMES DAILY       gemfibrozil 600 MG tablet    LOPID    180 tablet    TAKE 1 TABLET BY MOUTH TWICE DAILY       glipiZIDE 10 MG tablet    GLUCOTROL    180 tablet    TAKE 1 TABLET BY MOUTH TWICE DAILY BEFORE A MEAL       insulin aspart 100 UNIT/ML injection    NovoLOG PEN    12 mL    Inject 1-5 Units Subcutaneous 4 times daily (with meals and nightly) Before meal correction: 140-225 - take 1 unit  226-310 - take 2 units 311-395 - take 3 units 396-480 - take 4 units >481 - take 5 units Bedtime correction of: 200-299 give 1 unit 300-399 take 2 units 400 or greater take 3 units       insulin detemir 100 UNIT/ML injection    LEVEMIR FLEXPEN/FLEXTOUCH    15 mL    Inject 10 Units Subcutaneous every morning (before breakfast)       levothyroxine 25 MCG tablet    SYNTHROID/LEVOTHROID    90 tablet    TAKE 1 TABLET BY MOUTH EVERY DAY.       lisinopril 30 MG tablet    PRINIVIL,ZESTRIL    90 tablet    TAKE 1 TABLET BY MOUTH EVERY DAY       metoprolol 25 MG tablet    LOPRESSOR    180 tablet    TAKE 1 TABLET BY MOUTH TWICE DAILY       omeprazole 20 MG CR capsule    priLOSEC    90 capsule    TAKE 1 CAPSULE BY MOUTH EVERY DAY       ondansetron 8 MG  tablet    ZOFRAN    10 tablet    Take 1 tablet (8 mg) by mouth every 8 hours as needed (Nausea/Vomiting)       polyethylene glycol powder    MIRALAX/GLYCOLAX         prochlorperazine 5 MG tablet    COMPAZINE    30 tablet    Take 1 tablet (5 mg) by mouth every 6 hours as needed (Nausea/Vomiting)       Selenium 200 MCG Tabs      Take 100 mcg by mouth daily. Pt takes one half tab of the 200 mcg daily       tamsulosin 0.4 MG capsule    FLOMAX    90 capsule    TAKE 1 CAPSULE BY MOUTH DAILY       VITAMIN C PO      Take 1,000 mg by mouth daily       VITAMIN D3 PO      Take 1,000 Units by mouth daily       * Notice:  This list has 2 medication(s) that are the same as other medications prescribed for you. Read the directions carefully, and ask your doctor or other care provider to review them with you.

## 2017-04-19 ENCOUNTER — INFUSION THERAPY VISIT (OUTPATIENT)
Dept: INFUSION THERAPY | Facility: CLINIC | Age: 82
End: 2017-04-19
Attending: INTERNAL MEDICINE
Payer: MEDICARE

## 2017-04-19 ENCOUNTER — HOSPITAL ENCOUNTER (OUTPATIENT)
Facility: CLINIC | Age: 82
Setting detail: SPECIMEN
Discharge: HOME | End: 2017-04-19
Attending: INTERNAL MEDICINE | Admitting: INTERNAL MEDICINE
Payer: MEDICARE

## 2017-04-19 ENCOUNTER — DOCUMENTATION ONLY (OUTPATIENT)
Dept: PHARMACY | Facility: CLINIC | Age: 82
End: 2017-04-19

## 2017-04-19 VITALS
RESPIRATION RATE: 16 BRPM | HEART RATE: 72 BPM | BODY MASS INDEX: 27.94 KG/M2 | DIASTOLIC BLOOD PRESSURE: 68 MMHG | TEMPERATURE: 98.2 F | WEIGHT: 195.2 LBS | SYSTOLIC BLOOD PRESSURE: 144 MMHG | HEIGHT: 70 IN

## 2017-04-19 DIAGNOSIS — E83.52 HYPERCALCEMIA: Primary | ICD-10-CM

## 2017-04-19 DIAGNOSIS — C90.00 MULTIPLE MYELOMA NOT HAVING ACHIEVED REMISSION (H): ICD-10-CM

## 2017-04-19 LAB
ALBUMIN SERPL-MCNC: 2.8 G/DL (ref 3.4–5)
BASOPHILS # BLD AUTO: 0 10E9/L (ref 0–0.2)
BASOPHILS NFR BLD AUTO: 0 %
CALCIUM SERPL-MCNC: 9.7 MG/DL (ref 8.5–10.1)
CREAT SERPL-MCNC: 0.98 MG/DL (ref 0.66–1.25)
DIFFERENTIAL METHOD BLD: ABNORMAL
EOSINOPHIL # BLD AUTO: 0 10E9/L (ref 0–0.7)
EOSINOPHIL NFR BLD AUTO: 1.1 %
ERYTHROCYTE [DISTWIDTH] IN BLOOD BY AUTOMATED COUNT: 24.5 % (ref 10–15)
GFR SERPL CREATININE-BSD FRML MDRD: 72 ML/MIN/1.7M2
HCT VFR BLD AUTO: 25.1 % (ref 40–53)
HGB BLD-MCNC: 8.6 G/DL (ref 13.3–17.7)
IMM GRANULOCYTES # BLD: 0 10E9/L (ref 0–0.4)
IMM GRANULOCYTES NFR BLD: 0.4 %
LYMPHOCYTES # BLD AUTO: 0.6 10E9/L (ref 0.8–5.3)
LYMPHOCYTES NFR BLD AUTO: 22.1 %
MCH RBC QN AUTO: 34.5 PG (ref 26.5–33)
MCHC RBC AUTO-ENTMCNC: 34.3 G/DL (ref 31.5–36.5)
MCV RBC AUTO: 101 FL (ref 78–100)
MONOCYTES # BLD AUTO: 0.1 10E9/L (ref 0–1.3)
MONOCYTES NFR BLD AUTO: 3.6 %
NEUTROPHILS # BLD AUTO: 2.1 10E9/L (ref 1.6–8.3)
NEUTROPHILS NFR BLD AUTO: 72.8 %
NRBC # BLD AUTO: 0 10*3/UL
NRBC BLD AUTO-RTO: 0 /100
PLATELET # BLD AUTO: 44 10E9/L (ref 150–450)
RBC # BLD AUTO: 2.49 10E12/L (ref 4.4–5.9)
WBC # BLD AUTO: 2.8 10E9/L (ref 4–11)

## 2017-04-19 PROCEDURE — 96365 THER/PROPH/DIAG IV INF INIT: CPT

## 2017-04-19 PROCEDURE — 96367 TX/PROPH/DG ADDL SEQ IV INF: CPT

## 2017-04-19 PROCEDURE — 82040 ASSAY OF SERUM ALBUMIN: CPT | Performed by: INTERNAL MEDICINE

## 2017-04-19 PROCEDURE — 82565 ASSAY OF CREATININE: CPT | Performed by: INTERNAL MEDICINE

## 2017-04-19 PROCEDURE — 25000128 H RX IP 250 OP 636: Mod: JW | Performed by: INTERNAL MEDICINE

## 2017-04-19 PROCEDURE — 82310 ASSAY OF CALCIUM: CPT | Performed by: INTERNAL MEDICINE

## 2017-04-19 PROCEDURE — 85025 COMPLETE CBC W/AUTO DIFF WBC: CPT | Performed by: INTERNAL MEDICINE

## 2017-04-19 PROCEDURE — 96401 CHEMO ANTI-NEOPL SQ/IM: CPT

## 2017-04-19 RX ADMIN — BORTEZOMIB 2.1 MG: 3.5 INJECTION, POWDER, LYOPHILIZED, FOR SOLUTION INTRAVENOUS; SUBCUTANEOUS at 11:23

## 2017-04-19 RX ADMIN — SODIUM CHLORIDE 250 ML: 9 INJECTION, SOLUTION INTRAVENOUS at 10:47

## 2017-04-19 RX ADMIN — ZOLEDRONIC ACID 3.5 MG: 0.8 INJECTION, SOLUTION, CONCENTRATE INTRAVENOUS at 10:46

## 2017-04-19 ASSESSMENT — PAIN SCALES - GENERAL: PAINLEVEL: NO PAIN (0)

## 2017-04-19 NOTE — PROGRESS NOTES
We will be able to obtain drug for patient for him to start therapy tomorrow. He will take his first dose of Cytoxan tomorrow (4/20) and resume a Wednesday schedule for subsequent cycles to be with dexamethasone.    Celia Damico, PharmD, BCPS, [facility]  Hematology/Oncology Clinical Pharmacist  [facility]  [email or web address]

## 2017-04-19 NOTE — PROGRESS NOTES
"Oral Chemotherapy Monitoring Program  Patient Follow Up    Primary Oncologist: Dr. Quinn  Primary Oncology Clinic: [facility]  Cancer Diagnosis: Multiple Myeloma     Drug: Cytoxan 600mg once weekly  Start Date: ASAP  Dose is appropriate for patients: BSA, Renal Function   Expected duration of therapy: Until disease progression or unacceptable toxicity     Drug Interaction Assessment: No significant drug interactions identified.     Lab Monitoring Plan  C1D1+  CMP, CBC C2D1+ Call, CMP, CBC C3D1+ Call, CMP, CBC C4D1+ Call, CMP, CBC C5D1+ Call, CMP, CBC C6D1+ Call, CMP, CBC   C1D8+   C2D8+   C3D8+   C4D8+   C5D8+   C6D8+     C1D15+ Call C2D15+   C3D15+   C4D15+   C5D15+   C6D15+     C1D22+   C2D22+   C3D22+   C4D22+   C5D22+   C6D22+        Subjective/Objective:  Roc Parkinson is a 89 year old male seen in clinic for reeducation on his Cytoxan. Unfortunately, we are yet able to obtain his drug but we are diligently working on this for him to start ASAP.    ORAL CHEMOTHERAPY 4/12/2017   Drug Name Cytoxan (Cyclophsphamide)   Current Dosage 300mg   Current Schedule Weekly   Cycle Details Continuous   Planned next cycle start date 4/19/2017   Any new drug interactions? No   Is the dose as ordered appropriate for the patient? Yes       Last PHQ-2 Score on record:   PHQ-2 ( [facility]) 4/17/2017 3/20/2017   Q1: Little interest or pleasure in doing things 0 0   Q2: Feeling down, depressed or hopeless 0 0   PHQ-2 Score 0 0       Patient does not report depression symptoms.      Vitals:  BP:   BP Readings from Last 1 Encounters:   04/19/17 144/68     Wt Readings from Last 1 Encounters:   04/19/17 88.5 kg (195 lb 3.2 oz)     Estimated body surface area is 2.09 meters squared as calculated from the following:    Height as of an earlier encounter on 4/19/17: 1.778 m (5' 10\").    Weight as of an earlier encounter on 4/19/17: 88.5 kg (195 lb 3.2 oz).    Labs:  Lab Results   Component Value Date     04/12/2017      Lab " Results   Component Value Date    POTASSIUM 5.3 04/12/2017     Lab Results   Component Value Date    MICHELLE 9.7 04/19/2017     Lab Results   Component Value Date    ALBUMIN 2.8 04/19/2017     No results found for: MAG  No results found for: PHOS  Lab Results   Component Value Date    BUN 39 04/12/2017     Lab Results   Component Value Date    CR 0.98 04/19/2017       Lab Results   Component Value Date    AST 10 04/12/2017     Lab Results   Component Value Date    ALT 14 04/12/2017     Lab Results   Component Value Date    BILITOTAL 0.4 04/12/2017       Lab Results   Component Value Date    WBC 2.8 04/19/2017     Lab Results   Component Value Date    HGB 8.6 04/19/2017     Lab Results   Component Value Date    PLT 44 04/19/2017     Lab Results   Component Value Date    ANEU 2.1 04/19/2017         Assessment & Plan:  I spoke with the patient today regarding his Cytoxan dosing. We discussed that he will be taking 6 tablets (300mg) once weekly with food. I recommended that he takes therapy the same day he takes his dexamethasone as he will have antiemetic benefits from this. He verbalized understanding. He was provided with an educational printout. We will continue to follow along once he obtains drug.    Follow-Up:  Week of 4/24    Refill Due:  Pending start    Celia Damico, PharmD, BCPS, BCOP  Hematology/Oncology Clinical Pharmacist  [facility]  [email or web address]

## 2017-04-19 NOTE — PROGRESS NOTES
Infusion Nursing Note:  Roc Parkinson presents today for C5D8 Velcade & Zometa  Patient seen by provider today: No   present during visit today: Not Applicable.    Note: pt will fill rx for oral chemo today. Cytoxan teaching done by pharmacist.     Intravenous Access:  Peripheral IV placed.    Treatment Conditions:  Lab Results   Component Value Date    HGB 8.6 04/19/2017     Lab Results   Component Value Date    WBC 2.8 04/19/2017      Lab Results   Component Value Date    ANEU 2.1 04/19/2017     Lab Results   Component Value Date    PLT 44 04/19/2017      Lab Results   Component Value Date     04/12/2017                   Lab Results   Component Value Date    POTASSIUM 5.3 04/12/2017           No results found for: MAG         Lab Results   Component Value Date    CR 0.98 04/19/2017                   Lab Results   Component Value Date    MICHELLE 9.7 04/19/2017                Lab Results   Component Value Date    BILITOTAL 0.4 04/12/2017           Lab Results   Component Value Date    ALBUMIN 2.8 04/19/2017                    Lab Results   Component Value Date    ALT 14 04/12/2017           Lab Results   Component Value Date    AST 10 04/12/2017     Results reviewed, labs MET treatment parameters, ok to proceed with treatment-Zometa  Results reviewed, labs did NOT meet treatment parameters-platelets below parameters. Dr. Quinn notified and he stated to go ahead and give Velcade today.      Post Infusion Assessment:  Patient tolerated infusion without incident.  Patient tolerated injection without incident.  Site patent and intact, free from redness, edema or discomfort.  Access discontinued per protocol.    Discharge Plan:   Discharge instructions reviewed with: Patient.  Patient and/or family verbalized understanding of discharge instructions and all questions answered.  Copy of AVS reviewed with patient and/or family.  Patient will return next Wednesday for next appointment.  Patient discharged in  stable condition accompanied by: self.    Keya Arias, RN

## 2017-04-19 NOTE — PATIENT INSTRUCTIONS
Patricia will be in contact with you when your Cytoxan (Cyclophosphamide) is ready to be picked up from pharmacy. You will want to take this medication as soon as you get it, but with food. Use your Compazine/Prochlorperazin if you have stomach upset from the Cytoxan.   Call the pharmacist if you have questions about your medications.    Remember to drink extra water today and tomorrow. Very important for the IV medication you received today.

## 2017-04-19 NOTE — MR AVS SNAPSHOT
After Visit Summary   4/19/2017    Roc Parkinson    MRN: [id number]           Patient Information     Date Of Birth          7/7/1926        Visit Information        Provider Department      4/19/2017 9:00 AM  INFUSION CHAIR 6 [facility]        Today's Diagnoses     Hypercalcemia    -  1    Multiple myeloma not having achieved remission (H)          Care Instructions    Patricia will be in contact with you when your Cytoxan (Cyclophosphamide) is ready to be picked up from pharmacy. You will want to take this medication as soon as you get it, but with food. Use your Compazine/Prochlorperazin if you have stomach upset from the Cytoxan.   Call the pharmacist if you have questions about your medications.    Remember to drink extra water today and tomorrow. Very important for the IV medication you received today.        Follow-ups after your visit        Your next 10 appointments already scheduled     Apr 26, 2017  9:30 AM CDT   Level 5 with  INFUSION CHAIR 4   [facility] ([facility])    [facility]  [address] [facility] [address]  [facility] [address]   [phone]            May 01, 2017 10:30 AM CDT   SHORT with Dickson Reich MD   [facility] ([facility])    [address] [place] [address]   [phone]            May 01, 2017 12:30 PM CDT   Diabetic Education with  DIABETIC ED RESOURCE   [facility] ([facility])    [address] [address] [phone]            May 03, 2017  9:30 AM CDT   Level 5 with  INFUSION CHAIR 18   [facility] ([facility])    [facility]  [address]  [facility] [address]   [phone]            May 10, 2017  9:00 AM CDT    Level 5 with SH INFUSION CHAIR 15   [facility] ([facility])    [facility] [place]  [address]  [place] [address]   [phone]            May 10, 2017  9:45 AM CDT   Return Visit with Gretel Quinn MD   [facility] ([facility])    [facility] [place]  [address]  [place] [address]   [phone]            May 17, 2017 10:00 AM CDT   Level 5 with SH INFUSION CHAIR 17   [facility] ([facility])    [facility] [place]  [address]  [place] [address]   [phone]            May 24, 2017  9:30 AM CDT   Level 5 with SH INFUSION CHAIR 6   [facility] ([facility])    [facility] [place]  [address]  [place] [address]   [phone]            May 31, 2017 10:00 AM CDT   Level 5 with SH INFUSION CHAIR 13   [facility] ([facility])    [facility] [place]  [address]  [place] [address]   [phone]              Who to contact     If you have questions or need follow up information about today's clinic visit or your schedule please contact [facility] directly at [phone].  Normal or non-critical lab and imaging results will be communicated to you by [employer], letter or phone within 4 business days after the clinic has received the results. If you do not hear from us within 7 days, please contact the clinic through [employer] or phone. If you have a critical or abnormal lab result, we will notify you by phone as soon as possible.  Submit refill requests through [employer] or call your pharmacy and they will forward the refill request to us. Please allow 3 business days for your refill to be completed.          Additional Information  "About Your Visit        MyChart Information     [employer] lets you send messages to your doctor, view your test results, renew your prescriptions, schedule appointments and more. To sign up, go to [email or web address] . Click on \"Log in\" on the left side of the screen, which will take you to the Welcome page. Then click on \"Sign up Now\" on the right side of the page.     You will be asked to enter the access code listed below, as well as some personal information. Please follow the directions to create your username and password.     Your access code is: [email or web address]  Expires: 2017 12:48 PM     Your access code will  in 90 days. If you need help or a new code, please call your [place] clinic or [phone].        Care EveryWhere ID     This is your Care EveryWhere ID. This could be used by other organizations to access your [place] medical records  [id number]        Your Vitals Were     Pulse Temperature Respirations Height BMI (Body Mass Index)       72 98.2  F (36.8  C) (Oral) 16 1.778 m (5' 10\") 28.01 kg/m2        Blood Pressure from Last 3 Encounters:   17 148/78   17 124/60   17 121/57    Weight from Last 3 Encounters:   17 88.5 kg (195 lb 3.2 oz)   17 86.6 kg (191 lb)   17 88.5 kg (195 lb 3.2 oz)              We Performed the Following     Albumin level     Calcium     CBC with platelets differential     Creatinine     Nursing Communication 1     Treatment Conditions 2     Treatment Conditions     Treatment Conditions        Primary Care Provider Office Phone # Fax #    Dickson Reich [phone]       [facility] [address]  [facility] [address]        Thank you!     Thank you for choosing [facility]  for your care. Our goal is always to provide you with excellent care. Hearing back from our patients is one way we can continue to improve our services. Please take a few minutes to " complete the written survey that you may receive in the mail after your visit with us. Thank you!             Your Updated Medication List - Protect others around you: Learn how to safely use, store and throw away your medicines at www.disposemymeds.org.          This list is accurate as of: 4/19/17 11:44 AM.  Always use your most recent med list.                   Brand Name Dispense Instructions for use    acyclovir 400 MG tablet    ZOVIRAX    60 tablet    Take 1 tablet (400 mg) by mouth 2 times daily Viral Prophylaxis.       amLODIPine 5 MG tablet    NORVASC    30 tablet    TAKE 1 TABLET BY MOUTH DAILY       * B-D U/F 31G X 8 MM   Generic drug:  insulin pen needle          * insulin pen needle 31G X 8 MM    B-D U/F    100 each    Use 5 pen needles daily or as directed.       clotrimazole-betamethasone cream    LOTRISONE    30 g    Apply topically to the head of the penis two times a day for up to 2 weeks.       cyclophosphamide 50 MG capsule CHEMO    CYTOXAN    24 capsule    Take 6 capsules (300 mg) by mouth once a week       dexamethasone 4 MG tablet    DECADRON    40 tablet    Take 10 tablets (40 mg) by mouth every 7 days for 4 doses Take daily on Days 1, 8, 15, and 22. Cycles 3 and 4 ONLY.       ferrous sulfate 325 (65 FE) MG tablet    IRON     Take 325 mg by mouth daily (with breakfast)       FREESTYLE LITE test strip   Generic drug:  blood glucose monitoring     300 strip    USE TO TEST FOUR TIMES DAILY       gemfibrozil 600 MG tablet    LOPID    180 tablet    TAKE 1 TABLET BY MOUTH TWICE DAILY       glipiZIDE 10 MG tablet    GLUCOTROL    180 tablet    TAKE 1 TABLET BY MOUTH TWICE DAILY BEFORE A MEAL       insulin aspart 100 UNIT/ML injection    NovoLOG PEN    12 mL    Inject 1-6 Units Subcutaneous 4 times daily (with meals and nightly) Before meal correction: 140-225 - take 1 unit  226-310 - take 2 units 311-395 - take 3 units 396-480 - take 4 units >481 - take 5 units Bedtime correction of: 200-299 give 1  unit 300-399 take 2 units 400 or greater take 3 units       insulin detemir 100 UNIT/ML injection    LEVEMIR FLEXPEN/FLEXTOUCH    15 mL    Inject 10 Units Subcutaneous every morning (before breakfast)       levothyroxine 25 MCG tablet    SYNTHROID/LEVOTHROID    90 tablet    TAKE 1 TABLET BY MOUTH EVERY DAY.       lisinopril 30 MG tablet    PRINIVIL,ZESTRIL    90 tablet    TAKE 1 TABLET BY MOUTH EVERY DAY       metoprolol 25 MG tablet    LOPRESSOR    180 tablet    TAKE 1 TABLET BY MOUTH TWICE DAILY       omeprazole 20 MG CR capsule    priLOSEC    90 capsule    TAKE 1 CAPSULE BY MOUTH EVERY DAY       ondansetron 8 MG tablet    ZOFRAN    10 tablet    Take 1 tablet (8 mg) by mouth every 8 hours as needed (Nausea/Vomiting)       polyethylene glycol powder    MIRALAX/GLYCOLAX         prochlorperazine 5 MG tablet    COMPAZINE    30 tablet    Take 1 tablet (5 mg) by mouth every 6 hours as needed (Nausea/Vomiting)       Selenium 200 MCG Tabs      Take 100 mcg by mouth daily. Pt takes one half tab of the 200 mcg daily       tamsulosin 0.4 MG capsule    FLOMAX    90 capsule    TAKE 1 CAPSULE BY MOUTH DAILY       VITAMIN C PO      Take 1,000 mg by mouth daily       VITAMIN D3 PO      Take 1,000 Units by mouth daily       * Notice:  This list has 2 medication(s) that are the same as other medications prescribed for you. Read the directions carefully, and ask your doctor or other care provider to review them with you.

## 2017-04-23 DIAGNOSIS — Z79.4 TYPE 2 DIABETES MELLITUS WITH STAGE 3 CHRONIC KIDNEY DISEASE, WITH LONG-TERM CURRENT USE OF INSULIN (H): ICD-10-CM

## 2017-04-23 DIAGNOSIS — N18.30 TYPE 2 DIABETES MELLITUS WITH STAGE 3 CHRONIC KIDNEY DISEASE, WITH LONG-TERM CURRENT USE OF INSULIN (H): ICD-10-CM

## 2017-04-23 DIAGNOSIS — E11.22 TYPE 2 DIABETES MELLITUS WITH STAGE 3 CHRONIC KIDNEY DISEASE, WITH LONG-TERM CURRENT USE OF INSULIN (H): ICD-10-CM

## 2017-04-24 NOTE — TELEPHONE ENCOUNTER
glipiZIDE (GLUCOTROL) 10 MG tablet         Last Written Prescription Date: 11/18/16  Last Fill Quantity: 180, # refills: 1  Last Office Visit with [facility], [facility] or [facility] prescribing provider:  4/17/17   Next 5 appointments (look out 90 days)     May 01, 2017 10:30 AM CDT   SHORT with Dickson Reich MD   [facility] ([facility]    [address] [address]   [phone]            May 10, 2017  9:45 AM CDT   Return Visit with Gretel Quinn MD   [facility] ([facility])    [facility]  [address]  [facility] [address] [phone]                   BP Readings from Last 3 Encounters:   04/19/17 144/68   04/17/17 124/60   04/12/17 121/57     Lab Results   Component Value Date    MICROL 214 09/28/2016     Lab Results   Component Value Date    UMALCR 146.58 09/28/2016     Creatinine   Date Value Ref Range Status   04/19/2017 0.98 0.66 - 1.25 mg/dL Final   ]  GFR Estimate   Date Value Ref Range Status   04/19/2017 72 >60 mL/min/1.7m2 Final     Comment:     Non  GFR Calc   04/12/2017 75 >60 mL/min/1.7m2 Final     Comment:     Non  GFR Calc   04/05/2017 63 >60 mL/min/1.7m2 Final     Comment:     Non  GFR Calc     GFR Estimate If Black   Date Value Ref Range Status   04/19/2017 87 >60 mL/min/1.7m2 Final     Comment:      GFR Calc   04/12/2017 >90   GFR Calc   >60 mL/min/1.7m2 Final   04/05/2017 77 >60 mL/min/1.7m2 Final     Comment:      GFR Calc     Lab Results   Component Value Date    CHOL 106 05/09/2016     Lab Results   Component Value Date    HDL 25 05/09/2016     Lab Results   Component Value Date    LDL 61 05/09/2016     Lab Results   Component Value Date    TRIG 102 05/09/2016     Lab Results   Component Value Date    CHOLHDLRATIO 4.7 10/14/2015     Lab  Results   Component Value Date    AST 10 04/12/2017     Lab Results   Component Value Date    ALT 14 04/12/2017     Lab Results   Component Value Date    A1C 8.0 11/14/2016    A1C 8.8 09/28/2016    A1C 8.2 05/09/2016    A1C 7.2 10/14/2015    A1C 7.4 04/30/2015     Potassium   Date Value Ref Range Status   04/12/2017 5.3 3.4 - 5.3 mmol/L Final

## 2017-04-26 ENCOUNTER — HOSPITAL ENCOUNTER (OUTPATIENT)
Facility: CLINIC | Age: 82
Setting detail: SPECIMEN
Discharge: HOME | End: 2017-04-26
Attending: INTERNAL MEDICINE | Admitting: INTERNAL MEDICINE
Payer: MEDICARE

## 2017-04-26 ENCOUNTER — INFUSION THERAPY VISIT (OUTPATIENT)
Dept: INFUSION THERAPY | Facility: CLINIC | Age: 82
End: 2017-04-26
Attending: INTERNAL MEDICINE
Payer: MEDICARE

## 2017-04-26 ENCOUNTER — DOCUMENTATION ONLY (OUTPATIENT)
Dept: PHARMACY | Facility: CLINIC | Age: 82
End: 2017-04-26

## 2017-04-26 VITALS
DIASTOLIC BLOOD PRESSURE: 76 MMHG | OXYGEN SATURATION: 96 % | HEART RATE: 66 BPM | RESPIRATION RATE: 16 BRPM | SYSTOLIC BLOOD PRESSURE: 159 MMHG | TEMPERATURE: 98 F

## 2017-04-26 DIAGNOSIS — D53.9 MACROCYTIC ANEMIA: ICD-10-CM

## 2017-04-26 DIAGNOSIS — C90.00 MULTIPLE MYELOMA NOT HAVING ACHIEVED REMISSION (H): Primary | ICD-10-CM

## 2017-04-26 LAB
ABO + RH BLD: NORMAL
ABO + RH BLD: NORMAL
BASOPHILS # BLD AUTO: 0 10E9/L (ref 0–0.2)
BASOPHILS NFR BLD AUTO: 0 %
BLD GP AB SCN SERPL QL: NORMAL
BLD PROD TYP BPU: NORMAL
BLD PROD TYP BPU: NORMAL
BLD UNIT ID BPU: 0
BLOOD BANK CMNT PATIENT-IMP: NORMAL
BLOOD PRODUCT CODE: NORMAL
BPU ID: NORMAL
DIFFERENTIAL METHOD BLD: ABNORMAL
EOSINOPHIL # BLD AUTO: 0 10E9/L (ref 0–0.7)
EOSINOPHIL NFR BLD AUTO: 0.6 %
ERYTHROCYTE [DISTWIDTH] IN BLOOD BY AUTOMATED COUNT: 24.9 % (ref 10–15)
HCT VFR BLD AUTO: 22.1 % (ref 40–53)
HGB BLD-MCNC: 7.7 G/DL (ref 13.3–17.7)
IMM GRANULOCYTES # BLD: 0 10E9/L (ref 0–0.4)
IMM GRANULOCYTES NFR BLD: 0.3 %
LYMPHOCYTES # BLD AUTO: 0.6 10E9/L (ref 0.8–5.3)
LYMPHOCYTES NFR BLD AUTO: 15.6 %
MCH RBC QN AUTO: 35.5 PG (ref 26.5–33)
MCHC RBC AUTO-ENTMCNC: 34.8 G/DL (ref 31.5–36.5)
MCV RBC AUTO: 102 FL (ref 78–100)
MONOCYTES # BLD AUTO: 0.1 10E9/L (ref 0–1.3)
MONOCYTES NFR BLD AUTO: 2.3 %
NEUTROPHILS # BLD AUTO: 2.9 10E9/L (ref 1.6–8.3)
NEUTROPHILS NFR BLD AUTO: 81.2 %
NRBC # BLD AUTO: 0 10*3/UL
NRBC BLD AUTO-RTO: 0 /100
NUM BPU REQUESTED: 1
PLATELET # BLD AUTO: 43 10E9/L (ref 150–450)
RBC # BLD AUTO: 2.17 10E12/L (ref 4.4–5.9)
SPECIMEN EXP DATE BLD: NORMAL
TRANSFUSION STATUS PATIENT QL: NORMAL
TRANSFUSION STATUS PATIENT QL: NORMAL
WBC # BLD AUTO: 3.5 10E9/L (ref 4–11)

## 2017-04-26 PROCEDURE — 96401 CHEMO ANTI-NEOPL SQ/IM: CPT

## 2017-04-26 PROCEDURE — 86900 BLOOD TYPING SEROLOGIC ABO: CPT | Performed by: INTERNAL MEDICINE

## 2017-04-26 PROCEDURE — P9016 RBC LEUKOCYTES REDUCED: HCPCS | Performed by: INTERNAL MEDICINE

## 2017-04-26 PROCEDURE — 36430 TRANSFUSION BLD/BLD COMPNT: CPT

## 2017-04-26 PROCEDURE — 86923 COMPATIBILITY TEST ELECTRIC: CPT | Performed by: INTERNAL MEDICINE

## 2017-04-26 PROCEDURE — 86901 BLOOD TYPING SEROLOGIC RH(D): CPT | Performed by: INTERNAL MEDICINE

## 2017-04-26 PROCEDURE — 25000128 H RX IP 250 OP 636: Performed by: INTERNAL MEDICINE

## 2017-04-26 PROCEDURE — 86850 RBC ANTIBODY SCREEN: CPT | Performed by: INTERNAL MEDICINE

## 2017-04-26 PROCEDURE — 85025 COMPLETE CBC W/AUTO DIFF WBC: CPT | Performed by: INTERNAL MEDICINE

## 2017-04-26 RX ORDER — GLIPIZIDE 10 MG/1
TABLET ORAL
Qty: 180 TABLET | Refills: 1 | Status: SHIPPED | OUTPATIENT
Start: 2017-04-26 | End: 2017-01-01

## 2017-04-26 RX ADMIN — BORTEZOMIB 2.1 MG: 3.5 INJECTION, POWDER, LYOPHILIZED, FOR SOLUTION INTRAVENOUS; SUBCUTANEOUS at 14:04

## 2017-04-26 ASSESSMENT — PAIN SCALES - GENERAL: PAINLEVEL: NO PAIN (0)

## 2017-04-26 NOTE — PROGRESS NOTES
4/26/17: Confirmed with patient that he started oral cytoxan on 4/20, subsequent doses will be taken on Wednesdays starting 4/26 (he took his dose this morning) and continued forward.  Maria Eugenia Cortes PharmD  April 26, 2017

## 2017-04-26 NOTE — PROGRESS NOTES
Infusion Nursing Note:  Roc Parkinson presents today for velcade; possible transfusion.    Patient seen by provider today: No   present during visit today: Not Applicable.    Note: N/A.    Intravenous Access:  Peripheral IV placed.    Treatment Conditions:  Results reviewed,  ok per Dr Quinn to proceed with velcade with Plt of 43,000       Post Infusion Assessment:  Patient tolerated infusion without incident.  Patient tolerated injection without incident.  Blood return noted   Site patent and intact, free from redness, edema or discomfort.  No evidence of extravasations.  Access discontinued per protocol.    Discharge Plan:   Copy of AVS reviewed with patient and/or family. Patient discharged in stable condition accompanied by: self.  Departure Mode: Ambulatory.    Doug Rios RN

## 2017-04-26 NOTE — MR AVS SNAPSHOT
After Visit Summary   4/26/2017    Roc Parkinson    MRN: [id number]           Patient Information     Date Of Birth          7/7/1926        Visit Information        Provider Department      4/26/2017 9:30 AM  INFUSION CHAIR 4 [facility]        Today's Diagnoses     Multiple myeloma not having achieved remission (H)    -  1    Macrocytic anemia           Follow-ups after your visit        Your next 10 appointments already scheduled     May 01, 2017 10:30 AM CDT   SHORT with Dickson Reich MD   [facility] ([facility])    [address] [address]   [phone]            May 01, 2017 12:30 PM CDT   Diabetic Education with  DIABETIC ED RESOURCE   [facility] ([facility])    [address] [address]   [phone]            May 03, 2017  9:30 AM CDT   Level 5 with  INFUSION CHAIR 18   [facility] ([facility])    [facility]  [address]  [place] [address]   [phone]            May 10, 2017  9:00 AM CDT   Level 5 with  INFUSION CHAIR 15   [facility] ([facility])    [facility]  [address]  [place] [address]   [phone]            May 10, 2017  9:45 AM CDT   Return Visit with Gretel Quinn MD   [facility] ([facility])    [facility]  [address]  [place] [address]   [phone]            May 17, 2017 10:00 AM CDT   Level 5 with  INFUSION CHAIR 17   [facility] ([facility])    [facility]  [address]  [place] [address]   [phone]            May 24, 2017  9:30 AM  "CDT   Level 5 with  INFUSION CHAIR 6   [facility] ([facility])    [facility] [place] [address]  [place] [address]   [phone]            May 31, 2017 10:00 AM CDT   Level 5 with  INFUSION CHAIR 13   [facility] ([facility])    [facility] [place]  [address]  [place] [address]   [phone]              Future tests that were ordered for you today     Open Standing Orders        Priority Remaining Interval Expires Ordered    Red blood cell prepare order unit Routine 99/100 CONDITIONAL (SPECIFY) BLOOD  4/26/2017    Transfuse red blood cell unit Routine 99/100 TRANSFUSE 1 UNIT  4/26/2017            Who to contact     If you have questions or need follow up information about today's clinic visit or your schedule please contact [facility] directly at [phone].  Normal or non-critical lab and imaging results will be communicated to you by [employer], letter or phone within 4 business days after the clinic has received the results. If you do not hear from us within 7 days, please contact the clinic through "[employer] Fast [employer]t [employer] App"t or phone. If you have a critical or abnormal lab result, we will notify you by phone as soon as possible.  Submit refill requests through [employer] or call your pharmacy and they will forward the refill request to us. Please allow 3 business days for your refill to be completed.          Additional Information About Your Visit        So[employer] Information     [employer] lets you send messages to your doctor, view your test results, renew your prescriptions, schedule appointments and more. To sign up, go to [email or web address] Fast [employer] App"t . Click on \"Log in\" on the left side of the screen, which will take you to the Welcome page. Then click on \"Sign up Now\" on the right side of the page.     You will be asked to enter the access " code listed below, as well as some personal information. Please follow the directions to create your username and password.     Your access code is: [email or web address]  Expires: 2017 12:48 PM     Your access code will  in 90 days. If you need help or a new code, please call your [facility] or [phone].        Care EveryWhere ID     This is your Care EveryWhere ID. This could be used by other organizations to access your [place] medical records  [id number]        Your Vitals Were     Pulse Temperature Respirations Pulse Oximetry          66 98  F (36.7  C) (Oral) 16 96%         Blood Pressure from Last 3 Encounters:   17 159/76   17 144/68   17 124/60    Weight from Last 3 Encounters:   17 88.5 kg (195 lb 3.2 oz)   17 86.6 kg (191 lb)   17 88.5 kg (195 lb 3.2 oz)              We Performed the Following     ABO/Rh type and screen     Blood component     CBC with platelets differential     Obtain affirmation of informed consent     Red blood cell prepare order unit     Transfuse red blood cell unit     Treatment Conditions 2     Treatment Conditions     Treatment Conditions        Primary Care Provider Office Phone # Fax #    Dickson Reich [phone]       [facility] [address] [facility] [address]        Thank you!     Thank you for choosing [facility]  for your care. Our goal is always to provide you with excellent care. Hearing back from our patients is one way we can continue to improve our services. Please take a few minutes to complete the written survey that you may receive in the mail after your visit with us. Thank you!             Your Updated Medication List - Protect others around you: Learn how to safely use, store and throw away your medicines at www.disposemymeds.org.          This list is accurate as of: 17  2:14 PM.  Always use your most recent med list.                    Brand Name Dispense Instructions for use    acyclovir 400 MG tablet    ZOVIRAX    60 tablet    Take 1 tablet (400 mg) by mouth 2 times daily Viral Prophylaxis.       amLODIPine 5 MG tablet    NORVASC    30 tablet    TAKE 1 TABLET BY MOUTH DAILY       * B-D U/F 31G X 8 MM   Generic drug:  insulin pen needle          * insulin pen needle 31G X 8 MM    B-D U/F    100 each    Use 5 pen needles daily or as directed.       clotrimazole-betamethasone cream    LOTRISONE    30 g    Apply topically to the head of the penis two times a day for up to 2 weeks.       cyclophosphamide 50 MG capsule CHEMO    CYTOXAN    24 capsule    Take 6 capsules (300 mg) by mouth once a week       dexamethasone 4 MG tablet    DECADRON    40 tablet    Take 10 tablets (40 mg) by mouth every 7 days for 4 doses Take daily on Days 1, 8, 15, and 22. Cycles 3 and 4 ONLY.       ferrous sulfate 325 (65 FE) MG tablet    IRON     Take 325 mg by mouth daily (with breakfast)       FREESTYLE LITE test strip   Generic drug:  blood glucose monitoring     300 strip    USE TO TEST FOUR TIMES DAILY       gemfibrozil 600 MG tablet    LOPID    180 tablet    TAKE 1 TABLET BY MOUTH TWICE DAILY       glipiZIDE 10 MG tablet    GLUCOTROL    180 tablet    TAKE 1 TABLET BY MOUTH TWICE DAILY BEFORE A MEAL       insulin aspart 100 UNIT/ML injection    NovoLOG PEN    12 mL    Inject 1-6 Units Subcutaneous 4 times daily (with meals and nightly) Before meal correction: 140-225 - take 1 unit  226-310 - take 2 units 311-395 - take 3 units 396-480 - take 4 units >481 - take 5 units Bedtime correction of: 200-299 give 1 unit 300-399 take 2 units 400 or greater take 3 units       insulin detemir 100 UNIT/ML injection    LEVEMIR FLEXPEN/FLEXTOUCH    15 mL    Inject 10 Units Subcutaneous every morning (before breakfast)       levothyroxine 25 MCG tablet    SYNTHROID/LEVOTHROID    90 tablet    TAKE 1 TABLET BY MOUTH EVERY DAY.       lisinopril 30 MG tablet    PRINIVIL,ZESTRIL     90 tablet    TAKE 1 TABLET BY MOUTH EVERY DAY       metoprolol 25 MG tablet    LOPRESSOR    180 tablet    TAKE 1 TABLET BY MOUTH TWICE DAILY       omeprazole 20 MG CR capsule    priLOSEC    90 capsule    TAKE 1 CAPSULE BY MOUTH EVERY DAY       ondansetron 8 MG tablet    ZOFRAN    10 tablet    Take 1 tablet (8 mg) by mouth every 8 hours as needed (Nausea/Vomiting)       polyethylene glycol powder    MIRALAX/GLYCOLAX         prochlorperazine 5 MG tablet    COMPAZINE    30 tablet    Take 1 tablet (5 mg) by mouth every 6 hours as needed (Nausea/Vomiting)       Selenium 200 MCG Tabs      Take 100 mcg by mouth daily. Pt takes one half tab of the 200 mcg daily       tamsulosin 0.4 MG capsule    FLOMAX    90 capsule    TAKE 1 CAPSULE BY MOUTH DAILY       VITAMIN C PO      Take 1,000 mg by mouth daily       VITAMIN D3 PO      Take 1,000 Units by mouth daily       * Notice:  This list has 2 medication(s) that are the same as other medications prescribed for you. Read the directions carefully, and ask your doctor or other care provider to review them with you.

## 2017-05-01 ENCOUNTER — OFFICE VISIT (OUTPATIENT)
Dept: FAMILY MEDICINE | Facility: CLINIC | Age: 82
End: 2017-05-01
Payer: COMMERCIAL

## 2017-05-01 VITALS
RESPIRATION RATE: 16 BRPM | BODY MASS INDEX: 27.63 KG/M2 | TEMPERATURE: 97.3 F | HEART RATE: 68 BPM | HEIGHT: 70 IN | WEIGHT: 193 LBS | DIASTOLIC BLOOD PRESSURE: 68 MMHG | SYSTOLIC BLOOD PRESSURE: 136 MMHG

## 2017-05-01 DIAGNOSIS — E11.22 TYPE 2 DIABETES MELLITUS WITH DIABETIC CHRONIC KIDNEY DISEASE, UNSPECIFIED CKD STAGE, UNSPECIFIED LONG TERM INSULIN USE STATUS: ICD-10-CM

## 2017-05-01 PROCEDURE — 99213 OFFICE O/P EST LOW 20 MIN: CPT | Performed by: FAMILY MEDICINE

## 2017-05-01 NOTE — NURSING NOTE
"Chief Complaint   Patient presents with     Diabetes       Initial /68  Pulse 68  Temp 97.3  F (36.3  C) (Tympanic)  Resp 16  Ht 5' 10\" (1.778 m)  Wt 193 lb (87.5 kg)  BMI 27.69 kg/m2 Estimated body mass index is 27.69 kg/(m^2) as calculated from the following:    Height as of this encounter: 5' 10\" (1.778 m).    Weight as of this encounter: 193 lb (87.5 kg).  Medication Reconciliation: complete     Mariia Allen CMA      "

## 2017-05-01 NOTE — PROGRESS NOTES
SUBJECTIVE:                                                    Roc Parkinson is a 89 year old male who presents to clinic today for the following health issues:      Diabetes Follow-up    Patient is checking blood sugars: four times daily.    Blood sugar testing frequency justification: Uncontrolled diabetes  Results are as follows:         Varies between 60's-400    Diabetic concerns: None and blood sugar frequently over 200     Symptoms of hypoglycemia (low blood sugar): none     Paresthesias (numbness or burning in feet) or sores: No     Date of last diabetic eye exam: utd       Amount of exercise or physical activity: None    Problems taking medications regularly: No    Medication side effects: none    Diet: regular (no restrictions)          Problem list and histories reviewed & adjusted, as indicated.  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)     Irritable bowel syndrome     Vitamin D deficiency disease     Microalbuminuria     Obesity     UTI (urinary tract infection) w hx of recurrence     Iron deficiency anemia     Nonsmoker     A-fib (H)     Health Care Home     Hyperlipidemia     CKD (chronic kidney disease) stage 3, GFR 30-59 ml/min     Hypothyroidism     Long-term (current) use of anticoagulants [Z79.01]     Macrocytic anemia     GIB (gastrointestinal bleeding)     Multiple myeloma not having achieved remission (H)     Hypercalcemia     Hyperglycemia     Urinary tract infection     Hyperkalemia     ACP (advance care planning)     Past Surgical History:   Procedure Laterality Date     ARTHROPLASTY KNEE  11/5/2012    Procedure: ARTHROPLASTY KNEE;  RIGHT TOTAL KNEE  "ARTHROPLASTY (YEUNG & NEPHEW)^;  Surgeon: Dickson Schulte MD;  Location:  OR     BIOPSY      bladder     COLONOSCOPY  2007     COLONOSCOPY N/A 11/15/2016    Procedure: COMBINED COLONOSCOPY, SINGLE OR MULTIPLE BIOPSY/POLYPECTOMY BY BIOPSY;  Surgeon: Kenneth Fulton MD;  Location:  GI     GENITOURINARY SURGERY      TURP x2 and bladder scraping     GI SURGERY      anal fistula     ORTHOPEDIC SURGERY      lt knee in nov.2009     SOFT TISSUE SURGERY      melanoma       Social History   Substance Use Topics     Smoking status: Never Smoker     Smokeless tobacco: Never Used     Alcohol use No     Family History   Problem Relation Age of Onset     Cardiovascular Father 81     heart arrythmia     Endocrine Disease Brother 74     Paget's           Reviewed and updated as needed this visit by clinical staff  Tobacco  Allergies  Meds  Med Hx  Surg Hx  Fam Hx  Soc Hx      Reviewed and updated as needed this visit by Provider         ROS:  CONSTITUTIONAL:NEGATIVE for fever, chills, change in weight  ENDOCRINE: NEGATIVE for temperature intolerance, skin/hair changes, polydipsia, polyphagia and polyuria    OBJECTIVE:                                                    /68  Pulse 68  Temp 97.3  F (36.3  C) (Tympanic)  Resp 16  Ht 5' 10\" (1.778 m)  Wt 193 lb (87.5 kg)  BMI 27.69 kg/m2  Body mass index is 27.69 kg/(m^2).  GENERAL APPEARANCE: healthy, alert and no distress         ASSESSMENT/PLAN:                                                        ICD-10-CM    1. Type 2 diabetes mellitus with diabetic chronic kidney disease, unspecified CKD stage, unspecified long term insulin use status (H) E11.22 insulin detemir (LEVEMIR FLEXPEN/FLEXTOUCH) 100 UNIT/ML injection       Patient Instructions   Increase Levemir to 12 units daily and see back in 2 weeks.      Dickson Reich MD  [facility]    "

## 2017-05-01 NOTE — MR AVS SNAPSHOT
After Visit Summary   5/1/2017    Roc Parkinson    MRN: [id number]           Patient Information     Date Of Birth          7/7/1926        Visit Information        Provider Department      5/1/2017 10:30 AM Dickson Reich MD [facility]        Today's Diagnoses     Type 2 diabetes mellitus with diabetic chronic kidney disease, unspecified CKD stage, unspecified long term insulin use status (H)          Care Instructions    Increase Levemir to 12 units daily and see back in 2 weeks.        Follow-ups after your visit        Follow-up notes from your care team     Return in about 2 weeks (around 5/15/2017).      Your next 10 appointments already scheduled     May 03, 2017  9:30 AM CDT   Level 5 with SH INFUSION CHAIR 18   [facility] ([facility])    [facility]  [address]  [place] [address]   [phone]            May 10, 2017  9:00 AM CDT   Level 5 with  INFUSION CHAIR 15   [facility] ([facility])    [facility]  [address]  [place] [address]   [phone]            May 10, 2017  9:45 AM CDT   Return Visit with Gretel Quinn MD   [facility]    [facility]  [address]  [place] [address]   [phone]            May 15, 2017 10:30 AM CDT   SHORT with Dickson Reich MD   [facility]    [address] [place] [address]   [phone]            May 17, 2017 10:00 AM CDT   Level 5 with  INFUSION CHAIR 17   [facility])    [facility]  [address]  [place] [address]   [phone]            May 24, 2017  9:30  "AM CDT   Level 5 with  INFUSION CHAIR 6   [facility] ([facility])    [facility] [address]  [place] [address]   [phone]            May 31, 2017 10:00 AM CDT   Level 5 with SH INFUSION CHAIR 13   [facility] ([facility])    [facility] [place]  [address]  [place] [id number] [phone]              Who to contact     If you have questions or need follow up information about today's clinic visit or your schedule please contact [facility] directly at [phone].  Normal or non-critical lab and imaging results will be communicated to you by MyChart, letter or phone within 4 business days after the clinic has received the results. If you do not hear from us within 7 days, please contact the clinic through MyChart or phone. If you have a critical or abnormal lab result, we will notify you by phone as soon as possible.  Submit refill requests through [employer] or call your pharmacy and they will forward the refill request to us. Please allow 3 business days for your refill to be completed.          Additional Information About Your Visit        [employer] Information     [employer] lets you send messages to your doctor, view your test results, renew your prescriptions, schedule appointments and more. To sign up, go to [email or web address] . Click on \"Log in\" on the left side of the screen, which will take you to the Welcome page. Then click on \"Sign up Now\" on the right side of the page.     You will be asked to enter the access code listed below, as well as some personal information. Please follow the directions to create your username and password.     Your access code is: [email or web address]  Expires: 2017 12:48 PM     Your access code will  in 90 days. If you need help or a new code, please call your [facility] " "or [phone].        Care EveryWhere ID     This is your Care EveryWhere ID. This could be used by other organizations to access your [place] medical records  [id number]        Your Vitals Were     Pulse Temperature Respirations Height BMI (Body Mass Index)       68 97.3  F (36.3  C) (Tympanic) 16 5' 10\" (1.778 m) 27.69 kg/m2        Blood Pressure from Last 3 Encounters:   05/01/17 136/68   04/26/17 159/76   04/19/17 144/68    Weight from Last 3 Encounters:   05/01/17 193 lb (87.5 kg)   04/19/17 195 lb 3.2 oz (88.5 kg)   04/17/17 191 lb (86.6 kg)              Today, you had the following     No orders found for display         Today's Medication Changes          These changes are accurate as of: 5/1/17  4:42 PM.  If you have any questions, ask your nurse or doctor.               These medicines have changed or have updated prescriptions.        Dose/Directions    insulin detemir 100 UNIT/ML injection   Commonly known as:  LEVEMIR FLEXPEN/FLEXTOUCH   This may have changed:  how much to take   Used for:  Type 2 diabetes mellitus with diabetic chronic kidney disease, unspecified CKD stage, unspecified long term insulin use status (H)   Changed by:  Dickson Reich MD        Dose:  12 Units   Inject 12 Units Subcutaneous every morning (before breakfast)   Quantity:  15 mL   Refills:  1            Where to get your medicines      These medications were sent to [facility] [id number] [place] [address] [address] [facility] [address] [facility] [address]    Hours:  24-hours Phone:  [phone]     insulin detemir 100 UNIT/ML injection                Primary Care Provider Office Phone # Fax #    Dickson Reich [phone]       [facility] [address]  [facility] [address]        Thank you!     Thank you for choosing [facility]  for your care. Our goal is always to provide you with excellent " care. Hearing back from our patients is one way we can continue to improve our services. Please take a few minutes to complete the written survey that you may receive in the mail after your visit with us. Thank you!             Your Updated Medication List - Protect others around you: Learn how to safely use, store and throw away your medicines at www.disposemymeds.org.          This list is accurate as of: 5/1/17  4:42 PM.  Always use your most recent med list.                   Brand Name Dispense Instructions for use    acyclovir 400 MG tablet    ZOVIRAX    60 tablet    Take 1 tablet (400 mg) by mouth 2 times daily Viral Prophylaxis.       amLODIPine 5 MG tablet    NORVASC    30 tablet    TAKE 1 TABLET BY MOUTH DAILY       * B-D U/F 31G X 8 MM   Generic drug:  insulin pen needle          * insulin pen needle 31G X 8 MM    B-D U/F    100 each    Use 5 pen needles daily or as directed.       clotrimazole-betamethasone cream    LOTRISONE    30 g    Apply topically to the head of the penis two times a day for up to 2 weeks.       cyclophosphamide 50 MG capsule CHEMO    CYTOXAN    24 capsule    Take 6 capsules (300 mg) by mouth once a week       dexamethasone 4 MG tablet    DECADRON    40 tablet    Take 10 tablets (40 mg) by mouth every 7 days for 4 doses Take daily on Days 1, 8, 15, and 22. Cycles 3 and 4 ONLY.       ferrous sulfate 325 (65 FE) MG tablet    IRON     Take 325 mg by mouth daily (with breakfast)       FREESTYLE LITE test strip   Generic drug:  blood glucose monitoring     300 strip    USE TO TEST FOUR TIMES DAILY       gemfibrozil 600 MG tablet    LOPID    180 tablet    TAKE 1 TABLET BY MOUTH TWICE DAILY       glipiZIDE 10 MG tablet    GLUCOTROL    180 tablet    TAKE 1 TABLET BY MOUTH TWICE DAILY BEFORE A MEAL       insulin aspart 100 UNIT/ML injection    NovoLOG PEN    12 mL    Inject 1-6 Units Subcutaneous 4 times daily (with meals and nightly) Before meal correction: 140-225 - take 1 unit  226-310 -  take 2 units 311-395 - take 3 units 396-480 - take 4 units >481 - take 5 units Bedtime correction of: 200-299 give 1 unit 300-399 take 2 units 400 or greater take 3 units       insulin detemir 100 UNIT/ML injection    LEVEMIR FLEXPEN/FLEXTOUCH    15 mL    Inject 12 Units Subcutaneous every morning (before breakfast)       levothyroxine 25 MCG tablet    SYNTHROID/LEVOTHROID    90 tablet    TAKE 1 TABLET BY MOUTH EVERY DAY.       lisinopril 30 MG tablet    PRINIVIL,ZESTRIL    90 tablet    TAKE 1 TABLET BY MOUTH EVERY DAY       metoprolol 25 MG tablet    LOPRESSOR    180 tablet    TAKE 1 TABLET BY MOUTH TWICE DAILY       omeprazole 20 MG CR capsule    priLOSEC    90 capsule    TAKE 1 CAPSULE BY MOUTH EVERY DAY       ondansetron 8 MG tablet    ZOFRAN    10 tablet    Take 1 tablet (8 mg) by mouth every 8 hours as needed (Nausea/Vomiting)       polyethylene glycol powder    MIRALAX/GLYCOLAX         prochlorperazine 5 MG tablet    COMPAZINE    30 tablet    Take 1 tablet (5 mg) by mouth every 6 hours as needed (Nausea/Vomiting)       Selenium 200 MCG Tabs      Take 100 mcg by mouth daily. Pt takes one half tab of the 200 mcg daily       tamsulosin 0.4 MG capsule    FLOMAX    90 capsule    TAKE 1 CAPSULE BY MOUTH DAILY       VITAMIN C PO      Take 1,000 mg by mouth daily       VITAMIN D3 PO      Take 1,000 Units by mouth daily       * Notice:  This list has 2 medication(s) that are the same as other medications prescribed for you. Read the directions carefully, and ask your doctor or other care provider to review them with you.

## 2017-05-03 ENCOUNTER — HOSPITAL ENCOUNTER (OUTPATIENT)
Facility: CLINIC | Age: 82
Setting detail: SPECIMEN
Discharge: HOME | End: 2017-05-03
Attending: INTERNAL MEDICINE | Admitting: INTERNAL MEDICINE
Payer: MEDICARE

## 2017-05-03 ENCOUNTER — INFUSION THERAPY VISIT (OUTPATIENT)
Dept: INFUSION THERAPY | Facility: CLINIC | Age: 82
End: 2017-05-03
Attending: INTERNAL MEDICINE
Payer: MEDICARE

## 2017-05-03 VITALS
SYSTOLIC BLOOD PRESSURE: 147 MMHG | HEART RATE: 62 BPM | DIASTOLIC BLOOD PRESSURE: 67 MMHG | TEMPERATURE: 97.7 F | RESPIRATION RATE: 18 BRPM

## 2017-05-03 DIAGNOSIS — D53.9 MACROCYTIC ANEMIA: ICD-10-CM

## 2017-05-03 DIAGNOSIS — C90.00 MULTIPLE MYELOMA NOT HAVING ACHIEVED REMISSION (H): Primary | ICD-10-CM

## 2017-05-03 LAB
BASOPHILS # BLD AUTO: 0 10E9/L (ref 0–0.2)
BASOPHILS NFR BLD AUTO: 0 %
DIFFERENTIAL METHOD BLD: ABNORMAL
EOSINOPHIL # BLD AUTO: 0 10E9/L (ref 0–0.7)
EOSINOPHIL NFR BLD AUTO: 0.3 %
ERYTHROCYTE [DISTWIDTH] IN BLOOD BY AUTOMATED COUNT: 24.5 % (ref 10–15)
HCT VFR BLD AUTO: 23 % (ref 40–53)
HGB BLD-MCNC: 8 G/DL (ref 13.3–17.7)
IMM GRANULOCYTES # BLD: 0 10E9/L (ref 0–0.4)
IMM GRANULOCYTES NFR BLD: 0.3 %
LYMPHOCYTES # BLD AUTO: 0.5 10E9/L (ref 0.8–5.3)
LYMPHOCYTES NFR BLD AUTO: 14.1 %
MCH RBC QN AUTO: 34.8 PG (ref 26.5–33)
MCHC RBC AUTO-ENTMCNC: 34.8 G/DL (ref 31.5–36.5)
MCV RBC AUTO: 100 FL (ref 78–100)
MONOCYTES # BLD AUTO: 0.1 10E9/L (ref 0–1.3)
MONOCYTES NFR BLD AUTO: 2.8 %
NEUTROPHILS # BLD AUTO: 2.7 10E9/L (ref 1.6–8.3)
NEUTROPHILS NFR BLD AUTO: 82.5 %
NRBC # BLD AUTO: 0 10*3/UL
NRBC BLD AUTO-RTO: 0 /100
PLATELET # BLD AUTO: 43 10E9/L (ref 150–450)
RBC # BLD AUTO: 2.3 10E12/L (ref 4.4–5.9)
WBC # BLD AUTO: 3.3 10E9/L (ref 4–11)

## 2017-05-03 PROCEDURE — 86900 BLOOD TYPING SEROLOGIC ABO: CPT | Performed by: INTERNAL MEDICINE

## 2017-05-03 PROCEDURE — 86901 BLOOD TYPING SEROLOGIC RH(D): CPT | Performed by: INTERNAL MEDICINE

## 2017-05-03 PROCEDURE — 86850 RBC ANTIBODY SCREEN: CPT | Performed by: INTERNAL MEDICINE

## 2017-05-03 PROCEDURE — 85025 COMPLETE CBC W/AUTO DIFF WBC: CPT | Performed by: INTERNAL MEDICINE

## 2017-05-03 PROCEDURE — 96401 CHEMO ANTI-NEOPL SQ/IM: CPT

## 2017-05-03 PROCEDURE — 25000128 H RX IP 250 OP 636: Mod: JW | Performed by: INTERNAL MEDICINE

## 2017-05-03 RX ADMIN — BORTEZOMIB 2.1 MG: 3.5 INJECTION, POWDER, LYOPHILIZED, FOR SOLUTION INTRAVENOUS; SUBCUTANEOUS at 11:18

## 2017-05-03 ASSESSMENT — PAIN SCALES - GENERAL: PAINLEVEL: NO PAIN (0)

## 2017-05-03 NOTE — PROGRESS NOTES
Infusion Nursing Note:  Roc Parkinson presents today D22C5 for velcade and possible transfusion.    Patient seen by provider today: No   present during visit today: Not Applicable.    Note: N/A.    Intravenous Access:  Peripheral IV placed.    Treatment Conditions:  Lab Results   Component Value Date    HGB 8.0 05/03/2017     Lab Results   Component Value Date    WBC 3.3 05/03/2017      Lab Results   Component Value Date    ANEU 2.7 05/03/2017     Lab Results   Component Value Date    PLT 43 05/03/2017      Results reviewed, labs did NOT meet treatment parameters for transfusion or velcade. Dr Mendoza okmely'd pt to receive velcade today. V.O.R B Dr mendoza.      Post Infusion Assessment:  Patient tolerated injection without incident.  Site patent and intact, free from redness, edema or discomfort.  No evidence of extravasations.    Discharge Plan:   Patient and/or family verbalized understanding of discharge instructions and all questions answered.  Copy of AVS reviewed with patient and/or family.  Patient will return next wednesday for next appointment.  Patient discharged in stable condition accompanied by: self.  Departure Mode: Ambulatory.    Fernanda Bailey RN

## 2017-05-03 NOTE — MR AVS SNAPSHOT
After Visit Summary   5/3/2017    Roc Parkinson    MRN: [id number]           Patient Information     Date Of Birth          7/7/1926        Visit Information        Provider Department      5/3/2017 9:30 AM  INFUSION CHAIR 18 [facility]        Today's Diagnoses     Multiple myeloma not having achieved remission (H)    -  1    Macrocytic anemia           Follow-ups after your visit        Your next 10 appointments already scheduled     May 10, 2017  9:00 AM CDT   Level 5 with  INFUSION CHAIR 15   [facility] ([facility])    [facility]  [address]  [place] [address]   [phone]            May 10, 2017  9:45 AM CDT   Return Visit with Gretel Quinn MD   [facility]    [facility]  [address]  [place] [address]   [phone]            May 15, 2017 10:30 AM CDT   SHORT with Dickson Reich MD   [facility]    [address] [place] [address]   [phone]            May 17, 2017 10:00 AM CDT   Level 5 with  INFUSION CHAIR 17   [facility]    [facility]  [address]  [place] [address]   [phone]            May 24, 2017  9:30 AM CDT   Level 5 with  INFUSION CHAIR 6   [facility]    [facility]  [address]  [place] [address]   [phone]            May 31, 2017 10:00 AM CDT   Level 5 with  INFUSION CHAIR 13   [facility]    [facility]  [address]  [place] [address]   [phone]            Jarod  "2017 12:30 PM CDT   Diabetic Education with  DIABETIC ED RESOURCE   [facility]    [address] [address] [phone]              Future tests that were ordered for you today     Open Standing Orders        Priority Remaining Interval Expires Ordered    Red blood cell prepare order unit Routine 99/100 CONDITIONAL (SPECIFY) BLOOD  5/3/2017    Transfuse red blood cell unit Routine 99/100 TRANSFUSE 1 UNIT  5/3/2017            Who to contact     If you have questions or need follow up information about today's clinic visit or your schedule please contact [facility] directly at [phone].  Normal or non-critical lab and imaging results will be communicated to you by [employer], letter or phone within 4 business days after the clinic has received the results. If you do not hear from us within 7 days, please contact the clinic through [employer] or phone. If you have a critical or abnormal lab result, we will notify you by phone as soon as possible.  Submit refill requests through [employer] or call your pharmacy and they will forward the refill request to us. Please allow 3 business days for your refill to be completed.          Additional Information About Your Visit        [employer] Information     [employer] lets you send messages to your doctor, view your test results, renew your prescriptions, schedule appointments and more. To sign up, go to [email or web address] . Click on \"Log in\" on the left side of the screen, which will take you to the Welcome page. Then click on \"Sign up Now\" on the right side of the page.     You will be asked to enter the access code listed below, as well as some personal information. Please follow the directions to create your username and password.     Your access code is: [email or web address]  Expires: 2017 12:48 PM     Your access code will  in 90 days. If you need " help or a new code, please call your [facility] or [phone].        Care EveryWhere ID     This is your Care EveryWhere ID. This could be used by other organizations to access your [place] medical records  [id number]        Your Vitals Were     Pulse Temperature Respirations             62 97.7  F (36.5  C) (Oral) 18          Blood Pressure from Last 3 Encounters:   05/03/17 147/67   05/01/17 136/68   04/26/17 159/76    Weight from Last 3 Encounters:   05/01/17 87.5 kg (193 lb)   04/19/17 88.5 kg (195 lb 3.2 oz)   04/17/17 86.6 kg (191 lb)              We Performed the Following     CBC with platelets differential     Obtain affirmation of informed consent     Red blood cell prepare order unit     Transfuse red blood cell unit     Treatment Conditions 2     Treatment Conditions     Treatment Conditions        Primary Care Provider Office Phone # Fax #    Dickson Reich [phone]       [facility] [address] [facility] [address]        Thank you!     Thank you for choosing [facility]  for your care. Our goal is always to provide you with excellent care. Hearing back from our patients is one way we can continue to improve our services. Please take a few minutes to complete the written survey that you may receive in the mail after your visit with us. Thank you!             Your Updated Medication List - Protect others around you: Learn how to safely use, store and throw away your medicines at www.disposemymeds.org.          This list is accurate as of: 5/3/17 11:44 AM.  Always use your most recent med list.                   Brand Name Dispense Instructions for use    acyclovir 400 MG tablet    ZOVIRAX    60 tablet    Take 1 tablet (400 mg) by mouth 2 times daily Viral Prophylaxis.       amLODIPine 5 MG tablet    NORVASC    30 tablet    TAKE 1 TABLET BY MOUTH DAILY       * B-D U/F 31G X 8 MM   Generic drug:  insulin pen needle           * insulin pen needle 31G X 8 MM    B-D U/F    100 each    Use 5 pen needles daily or as directed.       clotrimazole-betamethasone cream    LOTRISONE    30 g    Apply topically to the head of the penis two times a day for up to 2 weeks.       cyclophosphamide 50 MG capsule CHEMO    CYTOXAN    24 capsule    Take 6 capsules (300 mg) by mouth once a week       dexamethasone 4 MG tablet    DECADRON    40 tablet    Take 10 tablets (40 mg) by mouth every 7 days for 4 doses Take daily on Days 1, 8, 15, and 22. Cycles 3 and 4 ONLY.       ferrous sulfate 325 (65 FE) MG tablet    IRON     Take 325 mg by mouth daily (with breakfast)       FREESTYLE LITE test strip   Generic drug:  blood glucose monitoring     300 strip    USE TO TEST FOUR TIMES DAILY       gemfibrozil 600 MG tablet    LOPID    180 tablet    TAKE 1 TABLET BY MOUTH TWICE DAILY       glipiZIDE 10 MG tablet    GLUCOTROL    180 tablet    TAKE 1 TABLET BY MOUTH TWICE DAILY BEFORE A MEAL       insulin aspart 100 UNIT/ML injection    NovoLOG PEN    12 mL    Inject 1-6 Units Subcutaneous 4 times daily (with meals and nightly) Before meal correction: 140-225 - take 1 unit  226-310 - take 2 units 311-395 - take 3 units 396-480 - take 4 units >481 - take 5 units Bedtime correction of: 200-299 give 1 unit 300-399 take 2 units 400 or greater take 3 units       insulin detemir 100 UNIT/ML injection    LEVEMIR FLEXPEN/FLEXTOUCH    15 mL    Inject 12 Units Subcutaneous every morning (before breakfast)       levothyroxine 25 MCG tablet    SYNTHROID/LEVOTHROID    90 tablet    TAKE 1 TABLET BY MOUTH EVERY DAY.       lisinopril 30 MG tablet    PRINIVIL,ZESTRIL    90 tablet    TAKE 1 TABLET BY MOUTH EVERY DAY       metoprolol 25 MG tablet    LOPRESSOR    180 tablet    TAKE 1 TABLET BY MOUTH TWICE DAILY       omeprazole 20 MG CR capsule    priLOSEC    90 capsule    TAKE 1 CAPSULE BY MOUTH EVERY DAY       ondansetron 8 MG tablet    ZOFRAN    10 tablet    Take 1 tablet (8 mg)  by mouth every 8 hours as needed (Nausea/Vomiting)       polyethylene glycol powder    MIRALAX/GLYCOLAX         prochlorperazine 5 MG tablet    COMPAZINE    30 tablet    Take 1 tablet (5 mg) by mouth every 6 hours as needed (Nausea/Vomiting)       Selenium 200 MCG Tabs      Take 100 mcg by mouth daily. Pt takes one half tab of the 200 mcg daily       tamsulosin 0.4 MG capsule    FLOMAX    90 capsule    TAKE 1 CAPSULE BY MOUTH DAILY       VITAMIN C PO      Take 1,000 mg by mouth daily       VITAMIN D3 PO      Take 1,000 Units by mouth daily       * Notice:  This list has 2 medication(s) that are the same as other medications prescribed for you. Read the directions carefully, and ask your doctor or other care provider to review them with you.

## 2017-05-08 RX ORDER — ALBUTEROL SULFATE 0.83 MG/ML
2.5 SOLUTION RESPIRATORY (INHALATION)
Status: CANCELLED | OUTPATIENT
Start: 2017-05-10

## 2017-05-08 RX ORDER — METHYLPREDNISOLONE SODIUM SUCCINATE 125 MG/2ML
125 INJECTION, POWDER, LYOPHILIZED, FOR SOLUTION INTRAMUSCULAR; INTRAVENOUS
Status: CANCELLED
Start: 2017-05-10

## 2017-05-08 RX ORDER — LORAZEPAM 2 MG/ML
0.5 INJECTION INTRAMUSCULAR EVERY 4 HOURS PRN
Status: CANCELLED
Start: 2017-05-10

## 2017-05-08 RX ORDER — EPINEPHRINE 0.3 MG/.3ML
0.3 INJECTION SUBCUTANEOUS EVERY 5 MIN PRN
Status: CANCELLED | OUTPATIENT
Start: 2017-05-24

## 2017-05-08 RX ORDER — MEPERIDINE HYDROCHLORIDE 25 MG/ML
25 INJECTION INTRAMUSCULAR; INTRAVENOUS; SUBCUTANEOUS EVERY 30 MIN PRN
Status: CANCELLED | OUTPATIENT
Start: 2017-05-10

## 2017-05-08 RX ORDER — SODIUM CHLORIDE 9 MG/ML
1000 INJECTION, SOLUTION INTRAVENOUS CONTINUOUS PRN
Status: CANCELLED
Start: 2017-01-01

## 2017-05-08 RX ORDER — SODIUM CHLORIDE 9 MG/ML
1000 INJECTION, SOLUTION INTRAVENOUS CONTINUOUS PRN
Status: CANCELLED
Start: 2017-05-10

## 2017-05-08 RX ORDER — LORAZEPAM 2 MG/ML
0.5 INJECTION INTRAMUSCULAR EVERY 4 HOURS PRN
Status: CANCELLED
Start: 2017-05-24

## 2017-05-08 RX ORDER — DIPHENHYDRAMINE HYDROCHLORIDE 50 MG/ML
50 INJECTION INTRAMUSCULAR; INTRAVENOUS
Status: CANCELLED
Start: 2017-05-17

## 2017-05-08 RX ORDER — ALBUTEROL SULFATE 90 UG/1
1-2 AEROSOL, METERED RESPIRATORY (INHALATION)
Status: CANCELLED
Start: 2017-05-17

## 2017-05-08 RX ORDER — MEPERIDINE HYDROCHLORIDE 25 MG/ML
25 INJECTION INTRAMUSCULAR; INTRAVENOUS; SUBCUTANEOUS EVERY 30 MIN PRN
Status: CANCELLED | OUTPATIENT
Start: 2017-01-01

## 2017-05-08 RX ORDER — ALBUTEROL SULFATE 90 UG/1
1-2 AEROSOL, METERED RESPIRATORY (INHALATION)
Status: CANCELLED
Start: 2017-01-01

## 2017-05-08 RX ORDER — DIPHENHYDRAMINE HYDROCHLORIDE 50 MG/ML
50 INJECTION INTRAMUSCULAR; INTRAVENOUS
Status: CANCELLED
Start: 2017-05-24

## 2017-05-08 RX ORDER — DIPHENHYDRAMINE HYDROCHLORIDE 50 MG/ML
50 INJECTION INTRAMUSCULAR; INTRAVENOUS
Status: CANCELLED
Start: 2017-01-01

## 2017-05-08 RX ORDER — SODIUM CHLORIDE 9 MG/ML
1000 INJECTION, SOLUTION INTRAVENOUS CONTINUOUS PRN
Status: CANCELLED
Start: 2017-05-24

## 2017-05-08 RX ORDER — METHYLPREDNISOLONE SODIUM SUCCINATE 125 MG/2ML
125 INJECTION, POWDER, LYOPHILIZED, FOR SOLUTION INTRAMUSCULAR; INTRAVENOUS
Status: CANCELLED
Start: 2017-01-01

## 2017-05-08 RX ORDER — SODIUM CHLORIDE 9 MG/ML
1000 INJECTION, SOLUTION INTRAVENOUS CONTINUOUS PRN
Status: CANCELLED
Start: 2017-05-17

## 2017-05-08 RX ORDER — MEPERIDINE HYDROCHLORIDE 25 MG/ML
25 INJECTION INTRAMUSCULAR; INTRAVENOUS; SUBCUTANEOUS EVERY 30 MIN PRN
Status: CANCELLED | OUTPATIENT
Start: 2017-05-24

## 2017-05-08 RX ORDER — LORAZEPAM 2 MG/ML
0.5 INJECTION INTRAMUSCULAR EVERY 4 HOURS PRN
Status: CANCELLED
Start: 2017-05-17

## 2017-05-08 RX ORDER — EPINEPHRINE 0.3 MG/.3ML
0.3 INJECTION SUBCUTANEOUS EVERY 5 MIN PRN
Status: CANCELLED | OUTPATIENT
Start: 2017-05-17

## 2017-05-08 RX ORDER — ALBUTEROL SULFATE 90 UG/1
1-2 AEROSOL, METERED RESPIRATORY (INHALATION)
Status: CANCELLED
Start: 2017-05-24

## 2017-05-08 RX ORDER — LORAZEPAM 2 MG/ML
0.5 INJECTION INTRAMUSCULAR EVERY 4 HOURS PRN
Status: CANCELLED
Start: 2017-01-01

## 2017-05-08 RX ORDER — ALBUTEROL SULFATE 90 UG/1
1-2 AEROSOL, METERED RESPIRATORY (INHALATION)
Status: CANCELLED
Start: 2017-05-10

## 2017-05-08 RX ORDER — METHYLPREDNISOLONE SODIUM SUCCINATE 125 MG/2ML
125 INJECTION, POWDER, LYOPHILIZED, FOR SOLUTION INTRAMUSCULAR; INTRAVENOUS
Status: CANCELLED
Start: 2017-05-17

## 2017-05-08 RX ORDER — DIPHENHYDRAMINE HYDROCHLORIDE 50 MG/ML
50 INJECTION INTRAMUSCULAR; INTRAVENOUS
Status: CANCELLED
Start: 2017-05-10

## 2017-05-08 RX ORDER — METHYLPREDNISOLONE SODIUM SUCCINATE 125 MG/2ML
125 INJECTION, POWDER, LYOPHILIZED, FOR SOLUTION INTRAMUSCULAR; INTRAVENOUS
Status: CANCELLED
Start: 2017-05-24

## 2017-05-08 RX ORDER — EPINEPHRINE 0.3 MG/.3ML
0.3 INJECTION SUBCUTANEOUS EVERY 5 MIN PRN
Status: CANCELLED | OUTPATIENT
Start: 2017-05-10

## 2017-05-08 RX ORDER — ALBUTEROL SULFATE 0.83 MG/ML
2.5 SOLUTION RESPIRATORY (INHALATION)
Status: CANCELLED | OUTPATIENT
Start: 2017-01-01

## 2017-05-08 RX ORDER — EPINEPHRINE 0.3 MG/.3ML
0.3 INJECTION SUBCUTANEOUS EVERY 5 MIN PRN
Status: CANCELLED | OUTPATIENT
Start: 2017-01-01

## 2017-05-08 RX ORDER — ALBUTEROL SULFATE 0.83 MG/ML
2.5 SOLUTION RESPIRATORY (INHALATION)
Status: CANCELLED | OUTPATIENT
Start: 2017-05-24

## 2017-05-08 RX ORDER — MEPERIDINE HYDROCHLORIDE 25 MG/ML
25 INJECTION INTRAMUSCULAR; INTRAVENOUS; SUBCUTANEOUS EVERY 30 MIN PRN
Status: CANCELLED | OUTPATIENT
Start: 2017-05-17

## 2017-05-08 RX ORDER — ALBUTEROL SULFATE 0.83 MG/ML
2.5 SOLUTION RESPIRATORY (INHALATION)
Status: CANCELLED | OUTPATIENT
Start: 2017-05-17

## 2017-05-10 ENCOUNTER — DOCUMENTATION ONLY (OUTPATIENT)
Dept: PHARMACY | Facility: CLINIC | Age: 82
End: 2017-05-10

## 2017-05-10 ENCOUNTER — HOSPITAL ENCOUNTER (OUTPATIENT)
Facility: CLINIC | Age: 82
Setting detail: SPECIMEN
Discharge: HOME | End: 2017-05-10
Attending: INTERNAL MEDICINE | Admitting: INTERNAL MEDICINE
Payer: MEDICARE

## 2017-05-10 ENCOUNTER — ONCOLOGY VISIT (OUTPATIENT)
Dept: ONCOLOGY | Facility: CLINIC | Age: 82
End: 2017-05-10
Attending: INTERNAL MEDICINE
Payer: MEDICARE

## 2017-05-10 ENCOUNTER — INFUSION THERAPY VISIT (OUTPATIENT)
Dept: INFUSION THERAPY | Facility: CLINIC | Age: 82
End: 2017-05-10
Attending: INTERNAL MEDICINE
Payer: MEDICARE

## 2017-05-10 VITALS
OXYGEN SATURATION: 96 % | SYSTOLIC BLOOD PRESSURE: 144 MMHG | DIASTOLIC BLOOD PRESSURE: 69 MMHG | WEIGHT: 194.6 LBS | HEART RATE: 64 BPM | RESPIRATION RATE: 16 BRPM | BODY MASS INDEX: 27.86 KG/M2 | HEIGHT: 70 IN | TEMPERATURE: 97.8 F

## 2017-05-10 VITALS
SYSTOLIC BLOOD PRESSURE: 118 MMHG | OXYGEN SATURATION: 96 % | WEIGHT: 194 LBS | HEART RATE: 74 BPM | RESPIRATION RATE: 16 BRPM | HEIGHT: 70 IN | BODY MASS INDEX: 27.77 KG/M2 | DIASTOLIC BLOOD PRESSURE: 59 MMHG | TEMPERATURE: 97.8 F

## 2017-05-10 DIAGNOSIS — C90.00 MULTIPLE MYELOMA NOT HAVING ACHIEVED REMISSION (H): Primary | ICD-10-CM

## 2017-05-10 DIAGNOSIS — D53.9 MACROCYTIC ANEMIA: ICD-10-CM

## 2017-05-10 LAB
ABO + RH BLD: NORMAL
ABO + RH BLD: NORMAL
ALBUMIN SERPL-MCNC: 2.8 G/DL (ref 3.4–5)
ALP SERPL-CCNC: 77 U/L (ref 40–150)
ALT SERPL W P-5'-P-CCNC: 14 U/L (ref 0–70)
ANION GAP SERPL CALCULATED.3IONS-SCNC: 9 MMOL/L (ref 3–14)
AST SERPL W P-5'-P-CCNC: 9 U/L (ref 0–45)
BASOPHILS # BLD AUTO: 0 10E9/L (ref 0–0.2)
BASOPHILS NFR BLD AUTO: 0 %
BILIRUB SERPL-MCNC: 0.3 MG/DL (ref 0.2–1.3)
BLD GP AB SCN SERPL QL: NORMAL
BLD PROD TYP BPU: NORMAL
BLD PROD TYP BPU: NORMAL
BLD UNIT ID BPU: 0
BLOOD BANK CMNT PATIENT-IMP: NORMAL
BLOOD PRODUCT CODE: NORMAL
BPU ID: NORMAL
BUN SERPL-MCNC: 40 MG/DL (ref 7–30)
CALCIUM SERPL-MCNC: 8.8 MG/DL (ref 8.5–10.1)
CHLORIDE SERPL-SCNC: 111 MMOL/L (ref 94–109)
CO2 SERPL-SCNC: 19 MMOL/L (ref 20–32)
CREAT SERPL-MCNC: 0.87 MG/DL (ref 0.66–1.25)
DIFFERENTIAL METHOD BLD: ABNORMAL
EOSINOPHIL # BLD AUTO: 0 10E9/L (ref 0–0.7)
EOSINOPHIL NFR BLD AUTO: 0.5 %
ERYTHROCYTE [DISTWIDTH] IN BLOOD BY AUTOMATED COUNT: 25.2 % (ref 10–15)
GFR SERPL CREATININE-BSD FRML MDRD: 82 ML/MIN/1.7M2
GLUCOSE SERPL-MCNC: 268 MG/DL (ref 70–99)
HCT VFR BLD AUTO: 20 % (ref 40–53)
HGB BLD-MCNC: 6.8 G/DL (ref 13.3–17.7)
IMM GRANULOCYTES # BLD: 0 10E9/L (ref 0–0.4)
IMM GRANULOCYTES NFR BLD: 0.5 %
LYMPHOCYTES # BLD AUTO: 0.3 10E9/L (ref 0.8–5.3)
LYMPHOCYTES NFR BLD AUTO: 15.1 %
MCH RBC QN AUTO: 34.7 PG (ref 26.5–33)
MCHC RBC AUTO-ENTMCNC: 34 G/DL (ref 31.5–36.5)
MCV RBC AUTO: 102 FL (ref 78–100)
MONOCYTES # BLD AUTO: 0.1 10E9/L (ref 0–1.3)
MONOCYTES NFR BLD AUTO: 2.7 %
NEUTROPHILS # BLD AUTO: 1.8 10E9/L (ref 1.6–8.3)
NEUTROPHILS NFR BLD AUTO: 81.2 %
NRBC # BLD AUTO: 0 10*3/UL
NRBC BLD AUTO-RTO: 0 /100
NUM BPU REQUESTED: 1
PLATELET # BLD AUTO: 45 10E9/L (ref 150–450)
POTASSIUM SERPL-SCNC: 4.8 MMOL/L (ref 3.4–5.3)
PROT SERPL-MCNC: 8.7 G/DL (ref 6.8–8.8)
RBC # BLD AUTO: 1.96 10E12/L (ref 4.4–5.9)
SODIUM SERPL-SCNC: 139 MMOL/L (ref 133–144)
SPECIMEN EXP DATE BLD: NORMAL
TRANSFUSION STATUS PATIENT QL: NORMAL
TRANSFUSION STATUS PATIENT QL: NORMAL
WBC # BLD AUTO: 2.2 10E9/L (ref 4–11)

## 2017-05-10 PROCEDURE — 83883 ASSAY NEPHELOMETRY NOT SPEC: CPT | Performed by: INTERNAL MEDICINE

## 2017-05-10 PROCEDURE — P9016 RBC LEUKOCYTES REDUCED: HCPCS | Performed by: INTERNAL MEDICINE

## 2017-05-10 PROCEDURE — 36415 COLL VENOUS BLD VENIPUNCTURE: CPT

## 2017-05-10 PROCEDURE — 96401 CHEMO ANTI-NEOPL SQ/IM: CPT

## 2017-05-10 PROCEDURE — 86900 BLOOD TYPING SEROLOGIC ABO: CPT | Performed by: INTERNAL MEDICINE

## 2017-05-10 PROCEDURE — 85025 COMPLETE CBC W/AUTO DIFF WBC: CPT | Performed by: INTERNAL MEDICINE

## 2017-05-10 PROCEDURE — 36430 TRANSFUSION BLD/BLD COMPNT: CPT

## 2017-05-10 PROCEDURE — 86850 RBC ANTIBODY SCREEN: CPT | Performed by: INTERNAL MEDICINE

## 2017-05-10 PROCEDURE — 99215 OFFICE O/P EST HI 40 MIN: CPT | Performed by: INTERNAL MEDICINE

## 2017-05-10 PROCEDURE — 86923 COMPATIBILITY TEST ELECTRIC: CPT | Performed by: INTERNAL MEDICINE

## 2017-05-10 PROCEDURE — 86901 BLOOD TYPING SEROLOGIC RH(D): CPT | Performed by: INTERNAL MEDICINE

## 2017-05-10 PROCEDURE — 00000402 ZZHCL STATISTIC TOTAL PROTEIN: Performed by: INTERNAL MEDICINE

## 2017-05-10 PROCEDURE — 84165 PROTEIN E-PHORESIS SERUM: CPT | Performed by: INTERNAL MEDICINE

## 2017-05-10 PROCEDURE — 80053 COMPREHEN METABOLIC PANEL: CPT | Performed by: INTERNAL MEDICINE

## 2017-05-10 PROCEDURE — 25000128 H RX IP 250 OP 636: Performed by: INTERNAL MEDICINE

## 2017-05-10 RX ORDER — CYCLOPHOSPHAMIDE 50 MG/1
300 CAPSULE ORAL WEEKLY
Qty: 24 CAPSULE | Refills: 0 | Status: SHIPPED | OUTPATIENT
Start: 2017-05-10 | End: 2017-05-24

## 2017-05-10 RX ORDER — DEXAMETHASONE 4 MG/1
40 TABLET ORAL
Qty: 40 TABLET | Refills: 0 | Status: SHIPPED | OUTPATIENT
Start: 2017-05-10 | End: 2017-01-01

## 2017-05-10 RX ORDER — DEXAMETHASONE 4 MG/1
40 TABLET ORAL
Qty: 40 TABLET | Refills: 0 | Status: SHIPPED | OUTPATIENT
Start: 2017-05-10 | End: 2017-05-10

## 2017-05-10 RX ADMIN — BORTEZOMIB 2.1 MG: 3.5 INJECTION, POWDER, LYOPHILIZED, FOR SOLUTION INTRAVENOUS; SUBCUTANEOUS at 10:55

## 2017-05-10 ASSESSMENT — PAIN SCALES - GENERAL: PAINLEVEL: NO PAIN (0)

## 2017-05-10 NOTE — MR AVS SNAPSHOT
After Visit Summary   5/10/2017    Roc Parkinson    MRN: [id number]           Patient Information     Date Of Birth          7/7/1926        Visit Information        Provider Department      5/10/2017 9:45 AM Gretel Quinn MD [facility]        Today's Diagnoses     Multiple myeloma not having achieved remission (H)    -  1      Care Instructions    1.  Continue cyclophosphamide, Velcade and dexamethasone. Treatment today  2.  CBC, diff weekly- transfuse to keep hemoglobin of 8 and platelet of 10,000.   3.  Labs today SPEP, IFXN, light chains  4.  Continue Zometa  5.  Refer for physical therapy for cancer rehab/leg weakness         Follow-ups after your visit        Additional Services     PHYSICAL THERAPY REFERRAL       *This therapy referral will be filtered to a centralized scheduling office at [facility] and the patient will receive a call to schedule an appointment at a [facility] location most convenient for them. *     [facility] provides Physical Therapy evaluation and treatment and many specialty services across the [facility] system.  If requesting a specialty program, please choose from the list below.    If you have not heard from the scheduling office within 2 business days, please call [phone] for all locations, with the exception of [place], please call [phone].  Treatment: Evaluation & Treatment  Special Instructions/Modalities: myopathy  Special Programs: cancer rehab    Please be aware that coverage of these services is subject to the terms and limitations of your health insurance plan.  Call member services at your health plan with any benefit or coverage questions.      **Note to Provider:  If you are referring outside of [facility] for the therapy appointment, please list the name of the location in the  special instructions  above, print the referral and give to the patient to schedule the appointment.                   Your next 10 appointments already scheduled     May 17, 2017 10:00 AM CDT   Level 5 with  INFUSION CHAIR 17   [facility] ([facility])    [facility] [place]  [address]  [place] [address]   [phone]            May 17, 2017 11:00 AM CDT   Return Visit with Gretel Quinn MD   [facility] ([facility])    [facility] [place] [address]  [place] [address]   [phone]            May 22, 2017 10:30 AM CDT   SHORT with Dickson Reich MD   [facility]    [address] [place] [id number] [phone]            May 24, 2017  9:30 AM CDT   Level 5 with  INFUSION CHAIR 6   [facility] ([facility])    [facility] [place] [address]  [place] [address]   [phone]            May 31, 2017 10:00 AM CDT   Level 5 with  INFUSION CHAIR 13   [facility] ([facility])    [facility] [place]  [address]  [place] [address]   [phone]            Jun 02, 2017 12:30 PM CDT   Diabetic Education with  DIABETIC ED RESOURCE   [facility] ([facility])    [address] [place] [id number] [phone]              Who to contact     If you have questions or need follow up information about today's clinic visit or your schedule please contact [facility] directly at [phone].  Normal or non-critical lab and imaging results will be communicated to you by MyChart, letter or phone within 4 business days after the clinic has received the results. If you do not hear from us within 7 days, please contact the clinic through MyChart or phone. If you have a  "critical or abnormal lab result, we will notify you by phone as soon as possible.  Submit refill requests through Ac[employer] or call your pharmacy and they will forward the refill request to us. Please allow 3 business days for your refill to be completed.          Additional Information About Your Visit        UpTaphart Information     Acorns lets you send messages to your doctor, view your test results, renew your prescriptions, schedule appointments and more. To sign up, go to [email or web address] . Click on \"Log in\" on the left side of the screen, which will take you to the Welcome page. Then click on \"Sign up Now\" on the right side of the page.     You will be asked to enter the access code listed below, as well as some personal information. Please follow the directions to create your username and password.     Your access code is: [email or web address]  Expires: 2017 12:48 PM     Your access code will  in 90 days. If you need help or a new code, please call your [place] clinic or [phone].        Care EveryWhere ID     This is your Care EveryWhere ID. This could be used by other organizations to access your [place] medical records  [id number]        Your Vitals Were     Pulse Temperature Respirations Height Pulse Oximetry BMI (Body Mass Index)    74 97.8  F (36.6  C) (Oral) 16 1.778 m (5' 10\") 96% 27.84 kg/m2       Blood Pressure from Last 3 Encounters:   05/10/17 118/59   05/10/17 144/69   17 147/67    Weight from Last 3 Encounters:   05/10/17 88 kg (194 lb)   05/10/17 88.3 kg (194 lb 9.6 oz)   17 87.5 kg (193 lb)              We Performed the Following     PHYSICAL THERAPY REFERRAL          Today's Medication Changes          These changes are accurate as of: 5/10/17 11:59 PM.  If you have any questions, ask your nurse or doctor.               These medicines have changed or have updated prescriptions.        Dose/Directions    * cyclophosphamide 50 MG capsule CHEMO   Commonly known as:  " CYTOXAN   This may have changed:  Another medication with the same name was added. Make sure you understand how and when to take each.   Used for:  Multiple myeloma not having achieved remission (H)   Changed by:  Celia Damico RPH        Dose:  300 mg   Take 6 capsules (300 mg) by mouth once a week   Quantity:  24 capsule   Refills:  0       * cyclophosphamide 50 MG capsule CHEMO   Commonly known as:  CYTOXAN   This may have changed:  You were already taking a medication with the same name, and this prescription was added. Make sure you understand how and when to take each.   Used for:  Multiple myeloma not having achieved remission (H)        Dose:  300 mg   Take 6 capsules (300 mg) by mouth once a week   Quantity:  24 capsule   Refills:  0       * dexamethasone 4 MG tablet   Commonly known as:  DECADRON   This may have changed:  Another medication with the same name was added. Make sure you understand how and when to take each.   Used for:  Multiple myeloma not having achieved remission (H)        Dose:  40 mg   Take 10 tablets (40 mg) by mouth every 7 days for 4 doses Take daily on Days 1, 8, 15, and 22. Cycles 3 and 4 ONLY.   Quantity:  40 tablet   Refills:  0       * dexamethasone 4 MG tablet   Commonly known as:  DECADRON   This may have changed:  You were already taking a medication with the same name, and this prescription was added. Make sure you understand how and when to take each.   Used for:  Multiple myeloma not having achieved remission (H)        Dose:  40 mg   Take 10 tablets (40 mg) by mouth every 7 days Take daily on Days 1, 8, 15, and 22.   Quantity:  40 tablet   Refills:  0       * Notice:  This list has 4 medication(s) that are the same as other medications prescribed for you. Read the directions carefully, and ask your doctor or other care provider to review them with you.         Where to get your medicines      These medications were sent to [facility] - [place], [place] - [address]  [address] [place] [address]     Phone:  [phone]     cyclophosphamide 50 MG capsule CHEMO    dexamethasone 4 MG tablet                Primary Care Provider Office Phone # Fax #    Dickson Reich [phone]       [facility] [address]  [facility] [address]        Thank you!     Thank you for choosing [facility]  for your care. Our goal is always to provide you with excellent care. Hearing back from our patients is one way we can continue to improve our services. Please take a few minutes to complete the written survey that you may receive in the mail after your visit with us. Thank you!             Your Updated Medication List - Protect others around you: Learn how to safely use, store and throw away your medicines at www.disposemymeds.org.          This list is accurate as of: 5/10/17 11:59 PM.  Always use your most recent med list.                   Brand Name Dispense Instructions for use    acyclovir 400 MG tablet    ZOVIRAX    60 tablet    Take 1 tablet (400 mg) by mouth 2 times daily Viral Prophylaxis.       amLODIPine 5 MG tablet    NORVASC    30 tablet    TAKE 1 TABLET BY MOUTH DAILY       * B-D U/F 31G X 8 MM   Generic drug:  insulin pen needle          * insulin pen needle 31G X 8 MM    B-D U/F    100 each    Use 5 pen needles daily or as directed.       clotrimazole-betamethasone cream    LOTRISONE    30 g    Apply topically to the head of the penis two times a day for up to 2 weeks.       * cyclophosphamide 50 MG capsule CHEMO    CYTOXAN    24 capsule    Take 6 capsules (300 mg) by mouth once a week       * cyclophosphamide 50 MG capsule CHEMO    CYTOXAN    24 capsule    Take 6 capsules (300 mg) by mouth once a week       * dexamethasone 4 MG tablet    DECADRON    40 tablet    Take 10 tablets (40 mg) by mouth every 7 days for 4 doses Take daily on Days 1, 8, 15, and 22. Cycles 3 and 4 ONLY.       *  dexamethasone 4 MG tablet    DECADRON    40 tablet    Take 10 tablets (40 mg) by mouth every 7 days Take daily on Days 1, 8, 15, and 22.       ferrous sulfate 325 (65 FE) MG tablet    IRON     Take 325 mg by mouth daily (with breakfast)       FREESTYLE LITE test strip   Generic drug:  blood glucose monitoring     300 strip    USE TO TEST FOUR TIMES DAILY       gemfibrozil 600 MG tablet    LOPID    180 tablet    TAKE 1 TABLET BY MOUTH TWICE DAILY       glipiZIDE 10 MG tablet    GLUCOTROL    180 tablet    TAKE 1 TABLET BY MOUTH TWICE DAILY BEFORE A MEAL       insulin aspart 100 UNIT/ML injection    NovoLOG PEN    12 mL    Inject 1-6 Units Subcutaneous 4 times daily (with meals and nightly) Before meal correction: 140-225 - take 1 unit  226-310 - take 2 units 311-395 - take 3 units 396-480 - take 4 units >481 - take 5 units Bedtime correction of: 200-299 give 1 unit 300-399 take 2 units 400 or greater take 3 units       insulin detemir 100 UNIT/ML injection    LEVEMIR FLEXPEN/FLEXTOUCH    15 mL    Inject 12 Units Subcutaneous every morning (before breakfast)       levothyroxine 25 MCG tablet    SYNTHROID/LEVOTHROID    90 tablet    TAKE 1 TABLET BY MOUTH EVERY DAY.       lisinopril 30 MG tablet    PRINIVIL,ZESTRIL    90 tablet    TAKE 1 TABLET BY MOUTH EVERY DAY       metoprolol 25 MG tablet    LOPRESSOR    180 tablet    TAKE 1 TABLET BY MOUTH TWICE DAILY       omeprazole 20 MG CR capsule    priLOSEC    90 capsule    TAKE 1 CAPSULE BY MOUTH EVERY DAY       ondansetron 8 MG tablet    ZOFRAN    10 tablet    Take 1 tablet (8 mg) by mouth every 8 hours as needed (Nausea/Vomiting)       polyethylene glycol powder    MIRALAX/GLYCOLAX         prochlorperazine 5 MG tablet    COMPAZINE    30 tablet    Take 1 tablet (5 mg) by mouth every 6 hours as needed (Nausea/Vomiting)       Selenium 200 MCG Tabs      Take 100 mcg by mouth daily. Pt takes one half tab of the 200 mcg daily       tamsulosin 0.4 MG capsule    FLOMAX    90  capsule    TAKE 1 CAPSULE BY MOUTH DAILY       VITAMIN C PO      Take 1,000 mg by mouth daily       VITAMIN D3 PO      Take 1,000 Units by mouth daily       * Notice:  This list has 6 medication(s) that are the same as other medications prescribed for you. Read the directions carefully, and ask your doctor or other care provider to review them with you.

## 2017-05-10 NOTE — MR AVS SNAPSHOT
After Visit Summary   5/10/2017    Roc Parkinson    MRN: [id number]           Patient Information     Date Of Birth          7/7/1926        Visit Information        Provider Department      5/10/2017 9:00 AM  INFUSION CHAIR 15 [facility]        Today's Diagnoses     Multiple myeloma not having achieved remission (H)    -  1    Macrocytic anemia           Follow-ups after your visit        Your next 10 appointments already scheduled     May 17, 2017 10:00 AM CDT   Level 5 with  INFUSION CHAIR 17   [facility] ([facility])    [facility]  [address]  [place] [address]   [phone]            May 22, 2017 10:30 AM CDT   SHORT with Dickson Reich MD   [facility] ([facility])    [address] [place] [address]   [phone]            May 24, 2017  9:30 AM CDT   Level 5 with  INFUSION CHAIR 6   [facility] ([facility])    [facility]  [address]  [place] [address]   [phone]            May 31, 2017 10:00 AM CDT   Level 5 with  INFUSION CHAIR 13   [facility] ([facility])    [facility]  [address]  [place] [address]   [phone]            Jun 02, 2017 12:30 PM CDT   Diabetic Education with  DIABETIC ED RESOURCE   [facility] ([facility])    [address] [place] [id number] [phone]              Future tests that were ordered for you today     Open Standing Orders        Priority Remaining Interval Expires Ordered    Red blood cell prepare order unit Routine 99/100 CONDITIONAL (SPECIFY) BLOOD  5/10/2017    Transfuse red blood cell unit  "Routine 99/100 TRANSFUSE 1 UNIT  5/10/2017            Who to contact     If you have questions or need follow up information about today's clinic visit or your schedule please contact [facility] directly at [phone].  Normal or non-critical lab and imaging results will be communicated to you by Call[employer] Brasil[employer], letter or phone within 4 business days after the clinic has received the results. If you do not hear from us within 7 days, please contact the clinic through [employer] or phone. If you have a critical or abnormal lab result, we will notify you by phone as soon as possible.  Submit refill requests through Silentium or call your pharmacy and they will forward the refill request to us. Please allow 3 business days for your refill to be completed.          Additional Information About Your Visit        Callix BrasilharStarMobile Information     Silentium lets you send messages to your doctor, view your test results, renew your prescriptions, schedule appointments and more. To sign up, go to [email or web address] . Click on \"Log in\" on the left side of the screen, which will take you to the Welcome page. Then click on \"Sign up Now\" on the right side of the page.     You will be asked to enter the access code listed below, as well as some personal information. Please follow the directions to create your username and password.     Your access code is: [email or web address]  Expires: 2017 12:48 PM     Your access code will  in 90 days. If you need help or a new code, please call your [place] clinic or [phone].        Care EveryWhere ID     This is your Care EveryWhere ID. This could be used by other organizations to access your [place] medical records  [id number]        Your Vitals Were     Pulse Temperature Respirations Height Pulse Oximetry BMI (Body Mass Index)    64 97.8  F (36.6  C) (Oral) 16 1.778 m (5' 10\") 96% 27.92 kg/m2       Blood Pressure from Last 3 Encounters:   05/10/17 118/59 "   05/10/17 144/69   05/03/17 147/67    Weight from Last 3 Encounters:   05/10/17 88 kg (194 lb)   05/10/17 88.3 kg (194 lb 9.6 oz)   05/01/17 87.5 kg (193 lb)              We Performed the Following     ABO/Rh type and screen     Blood component     CBC with platelets differential     Comprehensive metabolic panel     Obtain affirmation of informed consent     Red blood cell prepare order unit     Transfuse red blood cell unit     Treatment Conditions          Today's Medication Changes          These changes are accurate as of: 5/10/17  1:16 PM.  If you have any questions, ask your nurse or doctor.               These medicines have changed or have updated prescriptions.        Dose/Directions    * cyclophosphamide 50 MG capsule CHEMO   Commonly known as:  CYTOXAN   This may have changed:  Another medication with the same name was added. Make sure you understand how and when to take each.   Used for:  Multiple myeloma not having achieved remission (H)   Changed by:  Celia Damico RPH        Dose:  300 mg   Take 6 capsules (300 mg) by mouth once a week   Quantity:  24 capsule   Refills:  0       * cyclophosphamide 50 MG capsule CHEMO   Commonly known as:  CYTOXAN   This may have changed:  You were already taking a medication with the same name, and this prescription was added. Make sure you understand how and when to take each.   Used for:  Multiple myeloma not having achieved remission (H)        Dose:  300 mg   Take 6 capsules (300 mg) by mouth once a week   Quantity:  24 capsule   Refills:  0       * dexamethasone 4 MG tablet   Commonly known as:  DECADRON   This may have changed:  Another medication with the same name was added. Make sure you understand how and when to take each.   Used for:  Multiple myeloma not having achieved remission (H)        Dose:  40 mg   Take 10 tablets (40 mg) by mouth every 7 days for 4 doses Take daily on Days 1, 8, 15, and 22. Cycles 3 and 4 ONLY.   Quantity:  40 tablet    Refills:  0       * dexamethasone 4 MG tablet   Commonly known as:  DECADRON   This may have changed:  You were already taking a medication with the same name, and this prescription was added. Make sure you understand how and when to take each.   Used for:  Multiple myeloma not having achieved remission (H)        Dose:  40 mg   Take 10 tablets (40 mg) by mouth every 7 days Take daily on Days 1, 8, 15, and 22.   Quantity:  40 tablet   Refills:  0       * Notice:  This list has 4 medication(s) that are the same as other medications prescribed for you. Read the directions carefully, and ask your doctor or other care provider to review them with you.         Where to get your medicines      These medications were sent to [facility] - [address]  [address] [facility] [place], [place] [address]     Phone:  [phone]     cyclophosphamide 50 MG capsule CHEMO    dexamethasone 4 MG tablet                Primary Care Provider Office Phone # Fax #    Dickson Reich [phone]       [facility] [address]  [facility] [address]        Thank you!     Thank you for choosing [facility] AND [facility]  for your care. Our goal is always to provide you with excellent care. Hearing back from our patients is one way we can continue to improve our services. Please take a few minutes to complete the written survey that you may receive in the mail after your visit with us. Thank you!             Your Updated Medication List - Protect others around you: Learn how to safely use, store and throw away your medicines at www.disposemymeds.org.          This list is accurate as of: 5/10/17  1:16 PM.  Always use your most recent med list.                   Brand Name Dispense Instructions for use    acyclovir 400 MG tablet    ZOVIRAX    60 tablet    Take 1 tablet (400 mg) by mouth 2 times daily Viral Prophylaxis.       amLODIPine 5 MG tablet     NORVASC    30 tablet    TAKE 1 TABLET BY MOUTH DAILY       * B-D U/F 31G X 8 MM   Generic drug:  insulin pen needle          * insulin pen needle 31G X 8 MM    B-D U/F    100 each    Use 5 pen needles daily or as directed.       clotrimazole-betamethasone cream    LOTRISONE    30 g    Apply topically to the head of the penis two times a day for up to 2 weeks.       * cyclophosphamide 50 MG capsule CHEMO    CYTOXAN    24 capsule    Take 6 capsules (300 mg) by mouth once a week       * cyclophosphamide 50 MG capsule CHEMO    CYTOXAN    24 capsule    Take 6 capsules (300 mg) by mouth once a week       * dexamethasone 4 MG tablet    DECADRON    40 tablet    Take 10 tablets (40 mg) by mouth every 7 days for 4 doses Take daily on Days 1, 8, 15, and 22. Cycles 3 and 4 ONLY.       * dexamethasone 4 MG tablet    DECADRON    40 tablet    Take 10 tablets (40 mg) by mouth every 7 days Take daily on Days 1, 8, 15, and 22.       ferrous sulfate 325 (65 FE) MG tablet    IRON     Take 325 mg by mouth daily (with breakfast)       FREESTYLE LITE test strip   Generic drug:  blood glucose monitoring     300 strip    USE TO TEST FOUR TIMES DAILY       gemfibrozil 600 MG tablet    LOPID    180 tablet    TAKE 1 TABLET BY MOUTH TWICE DAILY       glipiZIDE 10 MG tablet    GLUCOTROL    180 tablet    TAKE 1 TABLET BY MOUTH TWICE DAILY BEFORE A MEAL       insulin aspart 100 UNIT/ML injection    NovoLOG PEN    12 mL    Inject 1-6 Units Subcutaneous 4 times daily (with meals and nightly) Before meal correction: 140-225 - take 1 unit  226-310 - take 2 units 311-395 - take 3 units 396-480 - take 4 units >481 - take 5 units Bedtime correction of: 200-299 give 1 unit 300-399 take 2 units 400 or greater take 3 units       insulin detemir 100 UNIT/ML injection    LEVEMIR FLEXPEN/FLEXTOUCH    15 mL    Inject 12 Units Subcutaneous every morning (before breakfast)       levothyroxine 25 MCG tablet    SYNTHROID/LEVOTHROID    90 tablet    TAKE 1 TABLET BY  MOUTH EVERY DAY.       lisinopril 30 MG tablet    PRINIVIL,ZESTRIL    90 tablet    TAKE 1 TABLET BY MOUTH EVERY DAY       metoprolol 25 MG tablet    LOPRESSOR    180 tablet    TAKE 1 TABLET BY MOUTH TWICE DAILY       omeprazole 20 MG CR capsule    priLOSEC    90 capsule    TAKE 1 CAPSULE BY MOUTH EVERY DAY       ondansetron 8 MG tablet    ZOFRAN    10 tablet    Take 1 tablet (8 mg) by mouth every 8 hours as needed (Nausea/Vomiting)       polyethylene glycol powder    MIRALAX/GLYCOLAX         prochlorperazine 5 MG tablet    COMPAZINE    30 tablet    Take 1 tablet (5 mg) by mouth every 6 hours as needed (Nausea/Vomiting)       Selenium 200 MCG Tabs      Take 100 mcg by mouth daily. Pt takes one half tab of the 200 mcg daily       tamsulosin 0.4 MG capsule    FLOMAX    90 capsule    TAKE 1 CAPSULE BY MOUTH DAILY       VITAMIN C PO      Take 1,000 mg by mouth daily       VITAMIN D3 PO      Take 1,000 Units by mouth daily       * Notice:  This list has 6 medication(s) that are the same as other medications prescribed for you. Read the directions carefully, and ask your doctor or other care provider to review them with you.

## 2017-05-10 NOTE — PROGRESS NOTES
Oral Chemotherapy Monitoring Program    Primary Oncologist: Kai  Primary Oncology Clinic: [facility]  Cancer Diagnosis: Multiple Myeloma    Therapy History:  Started on 4/20/17    Drug Interaction Assessment: No new drug interactions identified.    Lab Monitoring Plan  C1D1+   CMP, CBC C2D1+ Call, CMP, CBC C3D1+ Call, CMP, CBC C4D1+ Call, CMP, CBC C5D1+ Call, CMP, CBC C6D1+ Call, CMP, CBC   C1D8+  C2D8+  C3D8+  C4D8+  C5D8+  C6D8+    C1D15+ Call C2D15+  C3D15+  C4D15+  C5D15+  C6D15+    C1D22+  C2D22+  C3D22+  C4D22+  C5D22+  C6D22+        Subjective/Objective:  Roc Parkinson is a 89 year old male seen in clinic for a follow-up visit for oral chemotherapy.  Patient reports that he is not experiencing any side effects at this time. He denies having bladder pain or blood in the urine. He finished his current supply of Cytoxan this morning and will be getting a refill today.    ORAL CHEMOTHERAPY 4/12/2017 5/10/2017   Drug Name Cytoxan (Cyclophsphamide) Cytoxan (Cyclophsphamide)   Current Dosage 300mg 300mg   Current Schedule Weekly Weekly   Cycle Details Continuous Continuous   Start Date of Last Cycle - 4/20/2017   Planned next cycle start date 4/19/2017 -   Doses missed in last 2 weeks - 0   Adherence Assessment - Adherent   Adverse Effects - No AE identified during assessment   Any new drug interactions? No No   Is the dose as ordered appropriate for the patient? Yes Yes   Is the patient currently in pain? - Assessed in last 30 days.       Last PHQ-2 Score on record:   PHQ-2 ( 1999 Pfizer) 5/1/2017 4/17/2017   Q1: Little interest or pleasure in doing things 0 0   Q2: Feeling down, depressed or hopeless 0 0   PHQ-2 Score 0 0           Vitals:  BP:   BP Readings from Last 1 Encounters:   05/10/17 118/59     Wt Readings from Last 1 Encounters:   05/10/17 88 kg (194 lb)     Estimated body surface area is 2.08 meters squared as calculated from the following:    Height as of an earlier encounter on 5/10/17: 1.778 m  "(5' 10\").    Weight as of an earlier encounter on 5/10/17: 88 kg (194 lb).    Labs:  Lab Results   Component Value Date     05/10/2017      Lab Results   Component Value Date    POTASSIUM 4.8 05/10/2017     Lab Results   Component Value Date    MICHELLE 8.8 05/10/2017     Lab Results   Component Value Date    ALBUMIN 2.8 05/10/2017     No results found for: MAG  No results found for: PHOS  Lab Results   Component Value Date    BUN 40 05/10/2017     Lab Results   Component Value Date    CR 0.87 05/10/2017       Lab Results   Component Value Date    AST 9 05/10/2017     Lab Results   Component Value Date    ALT 14 05/10/2017     Lab Results   Component Value Date    BILITOTAL 0.3 05/10/2017       Lab Results   Component Value Date    WBC 2.2 05/10/2017     Lab Results   Component Value Date    HGB 6.8 05/10/2017     Lab Results   Component Value Date    PLT 45 05/10/2017     Lab Results   Component Value Date    ANEU 1.8 05/10/2017           Assessment:  Patient is tolerating treatment. Per Dr Quinn, current labs meet parameters to continue treatment.    Plan:  Patient will continue weekly Cytoxan, Velcade and Dexamethasone.    Follow-Up:  Will follow up in one week when labs are due.    Refill Due:  6/7/17    Alvarez GutierrezD.  "

## 2017-05-10 NOTE — PROGRESS NOTES
"Oncology Rooming Note    May 10, 2017 9:41 AM   Roc Parkinson is a 89 year old male who presents for:    Chief Complaint   Patient presents with     Oncology Clinic Visit     Multiple Myeloma      Initial Vitals: /59 (BP Location: Right arm, Patient Position: Chair, Cuff Size: Adult Regular)  Pulse 74  Temp 97.8  F (36.6  C) (Oral)  Resp 16  Ht 1.778 m (5' 10\")  Wt 88 kg (194 lb)  SpO2 96%  BMI 27.84 kg/m2 Estimated body mass index is 27.84 kg/(m^2) as calculated from the following:    Height as of this encounter: 1.778 m (5' 10\").    Weight as of this encounter: 88 kg (194 lb). Body surface area is 2.08 meters squared.  No Pain (0) Comment: Data Unavailable   No LMP for male patient.  Allergies reviewed: Yes  Medications reviewed: Yes    Medications: Medication refills not needed today.  Pharmacy name entered into [employer]: [facility] [address] [place] [address] [address] [place] [facility] [address]    Clinical concerns: Follow-Up Multiple Myeloma Dr. Quinn was notified.    5 minutes for nursing intake (face to face time)     Jassi Wilcox MA    DISCHARGE PLAN:  Next appointments: See patient instruction section-- Lavinia  Departure Mode: Ambulatory  Accompanied by: RN to Infusion with RN-to-RN report given to Alfa ARGUETA.  10 minutes for nursing discharge (face to face time)   Bere Pace RN       S 5/17/2017 Wed 10:00 AM 10:00 A 300 [facility] [[id number]] SH INFUSION CHAIR 17 [[id number]] LEVEL 5 [667] C6D8 Velcade,Zometa, Labs/Possible Transfusion       5/17/2017 Wed 11:00 AM 11:00 A 15 [facility] [[id number]] ALISON QUINN [[id number]] RETURN [657] Return - Multiple Myeloma      S 5/24/2017 Wed  9:30 AM  9:30 A 300 [facility] [[id number]] SH INFUSION CHAIR 6 [[id number]] LEVEL 5 [667] C6D15 Velcade, Labs/Possible Transfusion      S 5/31/2017 Wed 10:00 AM 10:00 A 300 [facility] [[id number]] SH INFUSION CHAIR 13 [[id number]] LEVEL 5 [667] C6D22 Velcade, Labs/Possible Transfusion             "

## 2017-05-10 NOTE — PATIENT INSTRUCTIONS
1.  Continue cyclophosphamide, Velcade and dexamethasone. Treatment today  2.  CBC, diff weekly- transfuse to keep hemoglobin of 8 and platelet of 10,000.   3.  Labs today SPEP, IFXN, light chains  4.  Continue Zometa  5.  Refer for physical therapy for cancer rehab/leg weakness

## 2017-05-10 NOTE — PROGRESS NOTES
"[facility] PHYSICIANS  HEMATOLOGY ONCOLOGY     DIAGNOSIS:    1- Kappa light chain IgM multiple myeloma in a 89-year-old patient.  Bone marrow biopsy on 11/17/2016 showed hypercellular bone marrow with 65% cellularity of light chain restricted plasma cells.  FISH or G-banding could not be performed due to insufficient sample.   There is a background of myelodysplastic syndrome.  The patient was initially seen in the hospital on 11/17/2016 for macrocytic anemia and pancytopenia and transfusion requirement and a bone marrow biopsy was performed.   2- History of prostate cancer s/p EBRT s/p brachytherapy in 2003 (recent PSA 0.10), superficial bladder cancer ,no recurrences since 1986     TREATMENT: 12/15/16 velcade/dex. 4/12/17 added cyclophosphamide 300 mg weekly.      SUBJECTIVE:  The patient was seen as a followup today. He has tolerated addition of cyclophosphamide well without significant side effects. No neuropathy. He is feeling some leg weakness.     REVIEW OF SYSTEMS:  A complete review of systems was performed and found to be negative other than pertinent positives mentioned in history of present illness.     Past medical, social histories reviewed.    Meds- Reviewed.     PHYSICAL EXAMINATION:   VITAL SIGNS:/59 (BP Location: Right arm, Patient Position: Chair, Cuff Size: Adult Regular)  Pulse 74  Temp 97.8  F (36.6  C) (Oral)  Resp 16  Ht 1.778 m (5' 10\")  Wt 88 kg (194 lb)  SpO2 96%  BMI 27.84 kg/m2  GENERAL: Sitting comfortably.   HEENT: Pupils are equal. Oropharynx is clear.   NECK: No cervical or supraclavicular lymphadenopathy.   LUNGS: Clear bilaterally.   HEART: S1, S2, regular.   ABDOMEN: Soft, nontender, nondistended, no hepatosplenomegaly.   EXTREMITIES: Warm, well perfused.   NEUROLOGIC: Alert, awake.   SKIN: No rash.   LYMPHATICS: No edema.      LABORATORY DATA:   Recent Labs   Lab Test  04/19/17   0900  04/12/17   0910  04/05/17   0920   NA   --   135  134   POTASSIUM   " --   5.3  5.4*   CHLORIDE   --   105  104   CO2   --   21  22   ANIONGAP   --   9  8   BUN   --   39*  42*   CR  0.98  0.94  1.09   GLC   --   286*  311*   MICHELLE  9.7  9.2  9.2     Recent Labs   Lab Test  05/03/17   0945  04/26/17   1000  04/19/17   0900   WBC  3.3*  3.5*  2.8*   HGB  8.0*  7.7*  8.6*   PLT  43*  43*  44*   MCV  100  102*  101*   NEUTROPHIL  82.5  81.2  72.8     Recent Labs   Lab Test  04/19/17   0900  04/12/17   0910  04/05/17   0920  03/29/17   0850   11/17/16   0938   BILITOTAL   --   0.4  0.4  0.5   < >   --    ALKPHOS   --   67  68  68   < >   --    ALT   --   14  16  17   < >   --    AST   --   10  12  10   < >   --    ALBUMIN  2.8*  2.7*  2.6*  2.7*   < >   --    LDH   --    --    --    --    --   110    < > = values in this interval not displayed.   Results for LACIE JACOB RICARDO (MRN [id number]) as of 5/10/2017 09:49   Ref. Range 2/8/2017 13:00 3/15/2017 09:30   Gamma Fraction Latest Ref Range: 0.7 - 1.6 g/dL 3.9 (H) 3.9 (H)   IGA Latest Ref Range: 70 - 380 mg/dL 26 (L) 11 (L)   IGG Latest Ref Range: 695 - 1620 mg/dL 378 (L) 382 (L)   IGM Latest Ref Range: 60 - 265 mg/dL 6470 (H) 6160 (H)   Immunofixation ELP Unknown (Note)... (Note)...   Kappa Free Lt Chain Latest Ref Range: 0.33 - 1.94 mg/dL 178.75 (H) 215.75 (H)   Kappa Lambda Ratio Latest Ref Range: 0.26 - 1.65  156.80 (H) 342.46 (H)   Lambda Free Lt Chain Latest Ref Range: 0.57 - 2.63 mg/dL 1.14 0.63   Monoclonal Peak Latest Ref Range: 0.0 g/dL 3.4 (H) 3.4 (H)         ECOG PS: 1    ASSESSMENT:  This is a 89-year-old gentleman who has kappa light chain multiple myeloma with hypercellular marrow with 65% of cells are light chain restricted plasma cells.  He has severe pancytopenia and has needed a transfusion in the hospital.  He did not have hypercalcemia and his renal function was normal. He has a background of myelodysplastic syndrome on his bone marrow biopsy; however, majority of the cell population was monoclonal plasma cell  population.   He was started on treatment due to cytopenia.   He is on weekly Velcade with dexamethasone 20 mg every week with every week labs and transfusion support at this time.   In 4/2017 his light chain levels were getting worse. M spike was stable. We added cyclophosphamide to the regimen.  - He continue to be cytopenic and this likely reflects background of MDS. We could not get cytogenetics from bone marrow due to low cellularity. He may need a follow up bone marrow.  I will repeat myeloma labs today.   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 I will have him see physical therapy for this and may need to decrease the dose of steroids.   He is transfusion dependent despite treatment for myeloma. If his myeloma is in better control then I would consider adding Aranesp.      PLAN:   1.  Continue cyclophosphamide, Velcade and dexamethasone. Treatment today  2.  CBC, diff weekly- transfuse to keep hemoglobin of 8 and platelet of 10,000.   3.  Labs today SPEP, IFXN, light chains  4.  Continue Zometa  5.  Refer for physical therapy for cancer rehab/leg weakness     ALISON JON MD    5/10/2017

## 2017-05-10 NOTE — PROGRESS NOTES
Infusion Nursing Note:  Roc Parkinson presents today for C6D1 velcade.    Patient seen by provider today: Yes: Dr. Quinn   present during visit today: Not Applicable.    Note: N/A.    Intravenous Access:  Peripheral IV placed.    Treatment Conditions:  Lab Results   Component Value Date    HGB 6.8 05/10/2017     Lab Results   Component Value Date    WBC 2.2 05/10/2017      Lab Results   Component Value Date    ANEU 1.8 05/10/2017     Lab Results   Component Value Date    PLT 45 05/10/2017      Lab Results   Component Value Date     05/10/2017                   Lab Results   Component Value Date    POTASSIUM 4.8 05/10/2017           No results found for: MAG         Lab Results   Component Value Date    CR 0.87 05/10/2017                   Lab Results   Component Value Date    MICHELLE 8.8 05/10/2017                Lab Results   Component Value Date    BILITOTAL 0.3 05/10/2017           Lab Results   Component Value Date    ALBUMIN 2.8 05/10/2017                    Lab Results   Component Value Date    ALT 14 05/10/2017           Lab Results   Component Value Date    AST 9 05/10/2017     Results reviewed, labs MET treatment parameters, ok to proceed with treatment.  Blood transfusion consent signed 12/21/16.  ONE UNIT OF PRBC TODAY PER PARAMETERS      Post Infusion Assessment:  Patient tolerated infusion without incident.  Patient tolerated injection without incident.  Site patent and intact, free from redness, edema or discomfort.  No evidence of extravasations.  Access discontinued per protocol.    Discharge Plan:   AVS to patient via MYCHART.  Patient will return 5/17/17 for next appointment.   Patient discharged in stable condition accompanied by: self.  Departure Mode: Ambulatory.    Alfa Morgan RN

## 2017-05-11 LAB
ALBUMIN SERPL ELPH-MCNC: 3.9 G/DL (ref 3.7–5.1)
ALPHA1 GLOB SERPL ELPH-MCNC: 0.4 G/DL (ref 0.2–0.4)
ALPHA2 GLOB SERPL ELPH-MCNC: 0.8 G/DL (ref 0.5–0.9)
B-GLOBULIN SERPL ELPH-MCNC: 0.8 G/DL (ref 0.6–1)
GAMMA GLOB SERPL ELPH-MCNC: 3.3 G/DL (ref 0.7–1.6)
KAPPA LC UR-MCNC: 98.75 MG/DL (ref 0.33–1.94)
KAPPA LC/LAMBDA SER: 182.87 {RATIO} (ref 0.26–1.65)
LAMBDA LC SERPL-MCNC: 0.54 MG/DL (ref 0.57–2.63)
M PROTEIN SERPL ELPH-MCNC: 3 G/DL
PROT PATTERN SERPL ELPH-IMP: ABNORMAL

## 2017-05-17 ENCOUNTER — TELEPHONE (OUTPATIENT)
Dept: PHARMACY | Facility: CLINIC | Age: 82
End: 2017-05-17

## 2017-05-17 ENCOUNTER — INFUSION THERAPY VISIT (OUTPATIENT)
Dept: INFUSION THERAPY | Facility: CLINIC | Age: 82
End: 2017-05-17
Attending: INTERNAL MEDICINE
Payer: MEDICARE

## 2017-05-17 ENCOUNTER — HOSPITAL ENCOUNTER (OUTPATIENT)
Facility: CLINIC | Age: 82
Setting detail: SPECIMEN
Discharge: HOME | End: 2017-05-17
Attending: INTERNAL MEDICINE | Admitting: INTERNAL MEDICINE
Payer: MEDICARE

## 2017-05-17 ENCOUNTER — ONCOLOGY VISIT (OUTPATIENT)
Dept: ONCOLOGY | Facility: CLINIC | Age: 82
End: 2017-05-17
Attending: INTERNAL MEDICINE
Payer: MEDICARE

## 2017-05-17 VITALS
HEART RATE: 65 BPM | HEIGHT: 70 IN | BODY MASS INDEX: 28.18 KG/M2 | OXYGEN SATURATION: 98 % | RESPIRATION RATE: 16 BRPM | DIASTOLIC BLOOD PRESSURE: 81 MMHG | SYSTOLIC BLOOD PRESSURE: 158 MMHG | TEMPERATURE: 97.7 F | WEIGHT: 196.8 LBS

## 2017-05-17 VITALS
HEART RATE: 71 BPM | DIASTOLIC BLOOD PRESSURE: 57 MMHG | TEMPERATURE: 98.1 F | OXYGEN SATURATION: 98 % | WEIGHT: 196.8 LBS | BODY MASS INDEX: 28.18 KG/M2 | HEIGHT: 70 IN | SYSTOLIC BLOOD PRESSURE: 137 MMHG | RESPIRATION RATE: 16 BRPM

## 2017-05-17 DIAGNOSIS — E83.52 HYPERCALCEMIA: ICD-10-CM

## 2017-05-17 DIAGNOSIS — C90.00 MULTIPLE MYELOMA NOT HAVING ACHIEVED REMISSION (H): Primary | ICD-10-CM

## 2017-05-17 DIAGNOSIS — D53.9 MACROCYTIC ANEMIA: ICD-10-CM

## 2017-05-17 DIAGNOSIS — D46.9 MDS (MYELODYSPLASTIC SYNDROME) (H): Primary | ICD-10-CM

## 2017-05-17 LAB
ABO + RH BLD: NORMAL
ABO + RH BLD: NORMAL
ALBUMIN SERPL-MCNC: 2.9 G/DL (ref 3.4–5)
ALP SERPL-CCNC: 68 U/L (ref 40–150)
ALT SERPL W P-5'-P-CCNC: 16 U/L (ref 0–70)
ANION GAP SERPL CALCULATED.3IONS-SCNC: 9 MMOL/L (ref 3–14)
AST SERPL W P-5'-P-CCNC: 11 U/L (ref 0–45)
BASOPHILS # BLD AUTO: 0 10E9/L (ref 0–0.2)
BASOPHILS NFR BLD AUTO: 0 %
BILIRUB SERPL-MCNC: 0.5 MG/DL (ref 0.2–1.3)
BLD GP AB SCN SERPL QL: NORMAL
BLD PROD TYP BPU: NORMAL
BLD PROD TYP BPU: NORMAL
BLD UNIT ID BPU: 0
BLOOD BANK CMNT PATIENT-IMP: NORMAL
BLOOD PRODUCT CODE: NORMAL
BPU ID: NORMAL
BUN SERPL-MCNC: 33 MG/DL (ref 7–30)
CALCIUM SERPL-MCNC: 9 MG/DL (ref 8.5–10.1)
CHLORIDE SERPL-SCNC: 108 MMOL/L (ref 94–109)
CO2 SERPL-SCNC: 20 MMOL/L (ref 20–32)
CREAT SERPL-MCNC: 0.94 MG/DL (ref 0.66–1.25)
DIFFERENTIAL METHOD BLD: ABNORMAL
EOSINOPHIL # BLD AUTO: 0 10E9/L (ref 0–0.7)
EOSINOPHIL NFR BLD AUTO: 0.5 %
ERYTHROCYTE [DISTWIDTH] IN BLOOD BY AUTOMATED COUNT: 24.1 % (ref 10–15)
GFR SERPL CREATININE-BSD FRML MDRD: 76 ML/MIN/1.7M2
GLUCOSE SERPL-MCNC: 272 MG/DL (ref 70–99)
HCT VFR BLD AUTO: 21.7 % (ref 40–53)
HGB BLD-MCNC: 7.5 G/DL (ref 13.3–17.7)
IMM GRANULOCYTES # BLD: 0 10E9/L (ref 0–0.4)
IMM GRANULOCYTES NFR BLD: 0.5 %
LYMPHOCYTES # BLD AUTO: 0.4 10E9/L (ref 0.8–5.3)
LYMPHOCYTES NFR BLD AUTO: 17.3 %
MCH RBC QN AUTO: 35 PG (ref 26.5–33)
MCHC RBC AUTO-ENTMCNC: 34.6 G/DL (ref 31.5–36.5)
MCV RBC AUTO: 101 FL (ref 78–100)
MONOCYTES # BLD AUTO: 0 10E9/L (ref 0–1.3)
MONOCYTES NFR BLD AUTO: 1.4 %
NEUTROPHILS # BLD AUTO: 1.7 10E9/L (ref 1.6–8.3)
NEUTROPHILS NFR BLD AUTO: 80.3 %
NRBC # BLD AUTO: 0 10*3/UL
NRBC BLD AUTO-RTO: 0 /100
NUM BPU REQUESTED: 1
PLATELET # BLD AUTO: 49 10E9/L (ref 150–450)
POTASSIUM SERPL-SCNC: 5.3 MMOL/L (ref 3.4–5.3)
PROT SERPL-MCNC: 8.5 G/DL (ref 6.8–8.8)
RBC # BLD AUTO: 2.14 10E12/L (ref 4.4–5.9)
SODIUM SERPL-SCNC: 137 MMOL/L (ref 133–144)
SPECIMEN EXP DATE BLD: NORMAL
TRANSFUSION STATUS PATIENT QL: NORMAL
TRANSFUSION STATUS PATIENT QL: NORMAL
WBC # BLD AUTO: 2.1 10E9/L (ref 4–11)

## 2017-05-17 PROCEDURE — 99214 OFFICE O/P EST MOD 30 MIN: CPT | Performed by: INTERNAL MEDICINE

## 2017-05-17 PROCEDURE — 96365 THER/PROPH/DIAG IV INF INIT: CPT

## 2017-05-17 PROCEDURE — P9016 RBC LEUKOCYTES REDUCED: HCPCS | Performed by: INTERNAL MEDICINE

## 2017-05-17 PROCEDURE — 36430 TRANSFUSION BLD/BLD COMPNT: CPT

## 2017-05-17 PROCEDURE — 85025 COMPLETE CBC W/AUTO DIFF WBC: CPT | Performed by: INTERNAL MEDICINE

## 2017-05-17 PROCEDURE — 80053 COMPREHEN METABOLIC PANEL: CPT | Performed by: INTERNAL MEDICINE

## 2017-05-17 PROCEDURE — 99211 OFF/OP EST MAY X REQ PHY/QHP: CPT | Mod: 25

## 2017-05-17 PROCEDURE — 82668 ASSAY OF ERYTHROPOIETIN: CPT | Performed by: INTERNAL MEDICINE

## 2017-05-17 PROCEDURE — 86900 BLOOD TYPING SEROLOGIC ABO: CPT | Performed by: INTERNAL MEDICINE

## 2017-05-17 PROCEDURE — 86850 RBC ANTIBODY SCREEN: CPT | Performed by: INTERNAL MEDICINE

## 2017-05-17 PROCEDURE — 96401 CHEMO ANTI-NEOPL SQ/IM: CPT

## 2017-05-17 PROCEDURE — 25000128 H RX IP 250 OP 636: Performed by: INTERNAL MEDICINE

## 2017-05-17 PROCEDURE — 86901 BLOOD TYPING SEROLOGIC RH(D): CPT | Performed by: INTERNAL MEDICINE

## 2017-05-17 PROCEDURE — 86923 COMPATIBILITY TEST ELECTRIC: CPT | Performed by: INTERNAL MEDICINE

## 2017-05-17 RX ADMIN — ZOLEDRONIC ACID 3.5 MG: 0.8 INJECTION, SOLUTION, CONCENTRATE INTRAVENOUS at 11:25

## 2017-05-17 RX ADMIN — SODIUM CHLORIDE 250 ML: 9 INJECTION, SOLUTION INTRAVENOUS at 11:30

## 2017-05-17 RX ADMIN — BORTEZOMIB 2.1 MG: 3.5 INJECTION, POWDER, LYOPHILIZED, FOR SOLUTION INTRAVENOUS; SUBCUTANEOUS at 11:27

## 2017-05-17 NOTE — MR AVS SNAPSHOT
After Visit Summary   5/17/2017    Roc Parkinson    MRN: [id number]           Patient Information     Date Of Birth          7/7/1926        Visit Information        Provider Department      5/17/2017 11:00 AM Gretel Quinn MD [facility]        Today's Diagnoses     MDS (myelodysplastic syndrome) (H)    -  1      Care Instructions    1.  Continue cyclophosphamide, Velcade and dexamethasone. Treatment today  2.  CBC, diff weekly- transfuse to keep hemoglobin of 8 and platelet of 10,000.   3.  Continue Zometa  4. Start Aranesp 300 mcg SQ every three weeks  5- RTC MD 3 weeks  6- Draw EPO level today        Follow-ups after your visit        Your next 10 appointments already scheduled     May 22, 2017 10:30 AM CDT   SHORT with Dickson Reich MD   [facility]    [address]  [facility] [address]   [phone]            May 24, 2017  9:30 AM CDT   Level 5 with SH INFUSION CHAIR 6   [facility] ([facility])    [facility]  [address]  [facility] [address]   [phone]            May 25, 2017  8:00 AM CDT   Cancer Rehab Eval with Jose Young, PT   [facility] ([facility])    [address]  [facility] [address]   [phone]            May 31, 2017 10:00 AM CDT   Level 5 with SH INFUSION CHAIR 13   [facility] ([facility])    [facility]  [address]  [facility] [address]   [phone]            Jun 02, 2017 12:30 PM CDT   Diabetic Education with  DIABETIC ED RESOURCE   [facility]    [address] [address]   [phone]            Jun 07, 2017  9:00 AM CDT   Level 5 with SH INFUSION  CHAIR 14   [facility] ([facility])    [facility] [place]  [address]  [place] [address]   [phone]            Jun 07, 2017 10:15 AM CDT   Return Visit with Gretel Quinn MD   [facility] ([facility])    [facility] [place]  [address]  [place] [address] [phone]            Jun 14, 2017  9:00 AM CDT   Level 5 with SH INFUSION CHAIR 17   [facility] ([facility])    [facility]  [address]  [place] [address]   [phone]            Jun 21, 2017  9:00 AM CDT   Level 5 with SH INFUSION CHAIR 19   [facility] ([facility])    [facility]  [address]  [place] [address]   [phone]            Jun 28, 2017 10:15 AM CDT   Return Visit with Gretel Quinn MD   [facility] ([facility])    [facility] [place] [id number]  [place] [address] [phone]              Future tests that were ordered for you today     Open Standing Orders        Priority Remaining Interval Expires Ordered    Red blood cell prepare order unit Routine 99/100 CONDITIONAL (SPECIFY) BLOOD  5/17/2017    Transfuse red blood cell unit Routine 99/100 TRANSFUSE 1 UNIT  5/17/2017            Who to contact     If you have questions or need follow up information about today's clinic visit or your schedule please contact [facility] directly at [phone].  Normal or non-critical lab and imaging results will be communicated to you by MyChart, letter or phone within 4 business days after the clinic has received the results. If you do not hear from us within 7 days, please contact the clinic through MyChart or phone. If you have a critical or abnormal lab result, we will notify you by  "phone as soon as possible.  Submit refill requests through [employer] or call your pharmacy and they will forward the refill request to us. Please allow 3 business days for your refill to be completed.          Additional Information About Your Visit        [employer] Information     [employer] lets you send messages to your doctor, view your test results, renew your prescriptions, schedule appointments and more. To sign up, go to [email or web address] . Click on \"Log in\" on the left side of the screen, which will take you to the Welcome page. Then click on \"Sign up Now\" on the right side of the page.     You will be asked to enter the access code listed below, as well as some personal information. Please follow the directions to create your username and password.     Your access code is: [email or web address]  Expires: 2017 12:48 PM     Your access code will  in 90 days. If you need help or a new code, please call your [place] clinic or [phone].        Care EveryWhere ID     This is your Care EveryWhere ID. This could be used by other organizations to access your [place] medical records  [id number]        Your Vitals Were     Pulse Temperature Respirations Height Pulse Oximetry BMI (Body Mass Index)    71 98.1  F (36.7  C) (Oral) 16 1.778 m (5' 10\") 98% 28.24 kg/m2       Blood Pressure from Last 3 Encounters:   17 137/57   17 140/66   05/10/17 118/59    Weight from Last 3 Encounters:   17 89.3 kg (196 lb 12.8 oz)   17 89.3 kg (196 lb 12.8 oz)   05/10/17 88 kg (194 lb)              We Performed the Following     Erythropoietin        Primary Care Provider Office Phone # Fax #    Dickson Reich [phone]       [facility] [address]  [facility] [address]        Thank you!     Thank you for choosing [facility]  for your care. Our goal is always to provide you with excellent care. Hearing back from our patients is one way we can " continue to improve our services. Please take a few minutes to complete the written survey that you may receive in the mail after your visit with us. Thank you!             Your Updated Medication List - Protect others around you: Learn how to safely use, store and throw away your medicines at www.disposemymeds.org.          This list is accurate as of: 5/17/17 12:26 PM.  Always use your most recent med list.                   Brand Name Dispense Instructions for use    acyclovir 400 MG tablet    ZOVIRAX    60 tablet    Take 1 tablet (400 mg) by mouth 2 times daily Viral Prophylaxis.       amLODIPine 5 MG tablet    NORVASC    30 tablet    TAKE 1 TABLET BY MOUTH DAILY       * B-D U/F 31G X 8 MM   Generic drug:  insulin pen needle          * insulin pen needle 31G X 8 MM    B-D U/F    100 each    Use 5 pen needles daily or as directed.       clotrimazole-betamethasone cream    LOTRISONE    30 g    Apply topically to the head of the penis two times a day for up to 2 weeks.       * cyclophosphamide 50 MG capsule CHEMO    CYTOXAN    24 capsule    Take 6 capsules (300 mg) by mouth once a week       * cyclophosphamide 50 MG capsule CHEMO    CYTOXAN    24 capsule    Take 6 capsules (300 mg) by mouth once a week       dexamethasone 4 MG tablet    DECADRON    40 tablet    Take 10 tablets (40 mg) by mouth every 7 days Take daily on Days 1, 8, 15, and 22.       ferrous sulfate 325 (65 FE) MG tablet    IRON     Take 325 mg by mouth daily (with breakfast)       FREESTYLE LITE test strip   Generic drug:  blood glucose monitoring     300 strip    USE TO TEST FOUR TIMES DAILY       gemfibrozil 600 MG tablet    LOPID    180 tablet    TAKE 1 TABLET BY MOUTH TWICE DAILY       glipiZIDE 10 MG tablet    GLUCOTROL    180 tablet    TAKE 1 TABLET BY MOUTH TWICE DAILY BEFORE A MEAL       insulin aspart 100 UNIT/ML injection    NovoLOG PEN    12 mL    Inject 1-6 Units Subcutaneous 4 times daily (with meals and nightly) Before meal correction:  140-225 - take 1 unit  226-310 - take 2 units 311-395 - take 3 units 396-480 - take 4 units >481 - take 5 units Bedtime correction of: 200-299 give 1 unit 300-399 take 2 units 400 or greater take 3 units       insulin detemir 100 UNIT/ML injection    LEVEMIR FLEXPEN/FLEXTOUCH    15 mL    Inject 12 Units Subcutaneous every morning (before breakfast)       levothyroxine 25 MCG tablet    SYNTHROID/LEVOTHROID    90 tablet    TAKE 1 TABLET BY MOUTH EVERY DAY.       lisinopril 30 MG tablet    PRINIVIL,ZESTRIL    90 tablet    TAKE 1 TABLET BY MOUTH EVERY DAY       metoprolol 25 MG tablet    LOPRESSOR    180 tablet    TAKE 1 TABLET BY MOUTH TWICE DAILY       omeprazole 20 MG CR capsule    priLOSEC    90 capsule    TAKE 1 CAPSULE BY MOUTH EVERY DAY       ondansetron 8 MG tablet    ZOFRAN    10 tablet    Take 1 tablet (8 mg) by mouth every 8 hours as needed (Nausea/Vomiting)       polyethylene glycol powder    MIRALAX/GLYCOLAX         prochlorperazine 5 MG tablet    COMPAZINE    30 tablet    Take 1 tablet (5 mg) by mouth every 6 hours as needed (Nausea/Vomiting)       Selenium 200 MCG Tabs      Take 100 mcg by mouth daily. Pt takes one half tab of the 200 mcg daily       tamsulosin 0.4 MG capsule    FLOMAX    90 capsule    TAKE 1 CAPSULE BY MOUTH DAILY       VITAMIN C PO      Take 1,000 mg by mouth daily       VITAMIN D3 PO      Take 1,000 Units by mouth daily       * Notice:  This list has 4 medication(s) that are the same as other medications prescribed for you. Read the directions carefully, and ask your doctor or other care provider to review them with you.

## 2017-05-17 NOTE — PROGRESS NOTES
"Oncology Rooming Note    May 17, 2017 10:37 AM   Roc Parkinson is a 89 year old male who presents for:    Chief Complaint   Patient presents with     Oncology Clinic Visit     Multiple myeloma not having achieved remission     Initial Vitals: /57  Pulse 71  Temp 98.1  F (36.7  C) (Oral)  Resp 16  Ht 1.778 m (5' 10\")  Wt 89.3 kg (196 lb 12.8 oz)  SpO2 98%  BMI 28.24 kg/m2 Estimated body mass index is 28.24 kg/(m^2) as calculated from the following:    Height as of this encounter: 1.778 m (5' 10\").    Weight as of this encounter: 89.3 kg (196 lb 12.8 oz). Body surface area is 2.1 meters squared.  Data Unavailable Comment: Data Unavailable   No LMP for male patient.  Allergies reviewed: Yes  Medications reviewed: Yes    Medications: Medication refills not needed today.  Pharmacy name entered into [employer]: [facility] [address] [place] [address] [address] AT [facility] [address]    Clinical concerns: None        4 minutes for nursing intake (face to face time)     Kiya Drake MA    DISCHARGE PLAN:  Next appointments: See patient instruction section-- Susy  Departure Mode: Ambulatory  Accompanied by: RN to Infusion with RN-to-RN report given to Alfa ARGUETA.  10 minutes for nursing discharge (face to face time)   Bere Pace RN    1.  Continue cyclophosphamide, Velcade and dexamethasone. Treatment today  2.  CBC, diff weekly- transfuse to keep hemoglobin of 8 and platelet of 10,000.   3.  Continue Zometa  4. Start Aranesp 300 mcg SQ every three weeks  5- RTC MD 3 weeks  6- Draw EPO level today     S 5/24/2017 Wed  9:30 AM  9:30 A 300 [facility] [[id number]]  INFUSION CHAIR 6 [[id number]] LEVEL 5 [667] C6D15 Velcade, Labs/Possible Transfusion/possible Arenesp      S 5/31/2017 Wed 10:00 AM 10:00 A 300 [facility] [[id number]] SH INFUSION CHAIR 13 [[id number]] LEVEL 5 [667] C6D22 Velcade, Labs/Possible Transfusion/possible Arenesp      S 6/7/2017 Wed  9:00 AM  9:00 A 300 [facility] [[id number]]  INFUSION " CHAIR 14 [[id number]] LEVEL 5 [667] C7D1 Cytoxan, Velcade, Dex possible Arenesp/poss transfusion/Dr. Quinn      S 6/7/2017 Wed 10:15 AM 10:15 A 15 [facility] [[id number]] ALISON QUINN [[id number]] RETURN [657] Return - Multiple Myeloma      S 6/14/2017 Wed  9:00 AM  9:00 A 300 [facility] [[id number]] SH INFUSION CHAIR 17 [[id number]] LEVEL 5 [667] C7D8 Velcade, Zometa, possible Arenesp/poss transfusion      S 6/21/2017 Wed  9:00 AM  9:00 A 300 [facility] [[id number]] SH INFUSION CHAIR 19 [[id number]] LEVEL 5 [667] C7D15 Velcade, possible transfusion      S 6/28/2017 Wed  9:00 AM  9:00 A 300 [facility] [[id number]] SH INFUSION CHAIR 14 [[id number]] LEVEL 5 [667] C7D22 Velcade,  possible Arenesp/poss transfusion/Dr. Quinn      S 6/28/2017 Wed 10:15 AM 10:15 A 15 [facility] [[id number]] ALISON QUINN [[id number]] RETURN [657] Return - Multiple Myeloma

## 2017-05-17 NOTE — MR AVS SNAPSHOT
After Visit Summary   5/17/2017    Roc Parkinson    MRN: [id number]           Patient Information     Date Of Birth          7/7/1926        Visit Information        Provider Department      5/17/2017 10:00 AM SH INFUSION CHAIR 17 [facility]        Today's Diagnoses     Multiple myeloma not having achieved remission (H)    -  1    Macrocytic anemia        Hypercalcemia           Follow-ups after your visit        Follow-up notes from your care team     Return in 7 days (on 5/24/2017).      Your next 10 appointments already scheduled     May 22, 2017 10:30 AM CDT   SHORT with Dickson Reich MD   [facility] ([facility]    [address]  [facility] [address]   [phone]            May 24, 2017  9:30 AM CDT   Level 5 with  INFUSION CHAIR 6   [facility] ([facility])    [facility]  [address]  [facility] [address]   [phone]            May 25, 2017  8:00 AM CDT   Cancer Rehab Eval with Jose Young, PT   [facility] ([facility])    [address]  [address]  [facility] [address] [phone]            May 31, 2017 10:00 AM CDT   Level 5 with  INFUSION CHAIR 13   [facility] ([facility])    [facility]  [address]  [facility] [address]   [phone]            Jun 02, 2017 12:30 PM CDT   Diabetic Education with  DIABETIC ED RESOURCE   [facility] ([facility])    [address] [address] [phone]            Jun 07, 2017  9:00 AM CDT   Level 5 with  INFUSION CHAIR 14   [facility] ([facility])    [facility]  [address]  [place] [address]   [phone]            Jun 07, 2017 10:15 AM CDT   Return Visit with Gretel Quinn MD   [facility] ([facility])    [facility]  [address]  [place] [address]   [phone]            Jun 14, 2017  9:00 AM CDT   Level 5 with  INFUSION CHAIR 17   [facility] ([facility])    [facility] [place] [id number]  [place] [address]   [phone]            Jun 21, 2017  9:00 AM CDT   Level 5 with  INFUSION CHAIR 19   [facility] ([facility])    [facility]  [address]  [place] [address]   [phone]            Jun 28, 2017 10:15 AM CDT   Return Visit with Gretel Quinn MD   [facility] ([facility])    [facility] [place] [id number]  [place] [address]   [phone]              Future tests that were ordered for you today     Open Standing Orders        Priority Remaining Interval Expires Ordered    Red blood cell prepare order unit Routine 99/100 CONDITIONAL (SPECIFY) BLOOD  5/17/2017    Transfuse red blood cell unit Routine 99/100 TRANSFUSE 1 UNIT  5/17/2017            Who to contact     If you have questions or need follow up information about today's clinic visit or your schedule please contact [facility] directly at [phone].  Normal or non-critical lab and imaging results will be communicated to you by MyChart, letter or phone within 4 business days after the clinic has received the results. If you do not hear from us within 7 days, please contact the clinic through MyChart or phone. If you have a critical or abnormal lab result, we will notify you by phone as soon as possible.  Submit refill requests through [employer] or call your pharmacy and they will forward  "the refill request to us. Please allow 3 business days for your refill to be completed.          Additional Information About Your Visit        [email or web address] Information     Gl[employer] lets you send messages to your doctor, view your test results, renew your prescriptions, schedule appointments and more. To sign up, go to [email or web address] . Click on \"Log in\" on the left side of the screen, which will take you to the Welcome page. Then click on \"Sign up Now\" on the right side of the page.     You will be asked to enter the access code listed below, as well as some personal information. Please follow the directions to create your username and password.     Your access code is: [email or web address]  Expires: 2017 12:48 PM     Your access code will  in 90 days. If you need help or a new code, please call your [facility] clinic or [phone].        Care EveryWhere ID     This is your Care EveryWhere ID. This could be used by other organizations to access your [facility] medical records  [id number]        Your Vitals Were     Pulse Temperature Respirations Height Pulse Oximetry BMI (Body Mass Index)    65 97.7  F (36.5  C) (Oral) 16 1.778 m (5' 10\") 98% 28.24 kg/m2       Blood Pressure from Last 3 Encounters:   17 137/57   17 158/81   05/10/17 118/59    Weight from Last 3 Encounters:   17 89.3 kg (196 lb 12.8 oz)   17 89.3 kg (196 lb 12.8 oz)   05/10/17 88 kg (194 lb)              We Performed the Following     ABO/Rh type and screen     Blood component     CBC with platelets differential     Comprehensive metabolic panel     Obtain affirmation of informed consent     Red blood cell prepare order unit     Transfuse red blood cell unit     Treatment Conditions 2     Treatment Conditions     Treatment Conditions        Primary Care Provider Office Phone # Fax #    Dickson Reich [phone]       [facility] [address]  [facility] [address]        Thank " you!     Thank you for choosing [facility]  for your care. Our goal is always to provide you with excellent care. Hearing back from our patients is one way we can continue to improve our services. Please take a few minutes to complete the written survey that you may receive in the mail after your visit with us. Thank you!             Your Updated Medication List - Protect others around you: Learn how to safely use, store and throw away your medicines at www.disposemymeds.org.          This list is accurate as of: 5/17/17  2:49 PM.  Always use your most recent med list.                   Brand Name Dispense Instructions for use    acyclovir 400 MG tablet    ZOVIRAX    60 tablet    Take 1 tablet (400 mg) by mouth 2 times daily Viral Prophylaxis.       amLODIPine 5 MG tablet    NORVASC    30 tablet    TAKE 1 TABLET BY MOUTH DAILY       * B-D U/F 31G X 8 MM   Generic drug:  insulin pen needle          * insulin pen needle 31G X 8 MM    B-D U/F    100 each    Use 5 pen needles daily or as directed.       clotrimazole-betamethasone cream    LOTRISONE    30 g    Apply topically to the head of the penis two times a day for up to 2 weeks.       * cyclophosphamide 50 MG capsule CHEMO    CYTOXAN    24 capsule    Take 6 capsules (300 mg) by mouth once a week       * cyclophosphamide 50 MG capsule CHEMO    CYTOXAN    24 capsule    Take 6 capsules (300 mg) by mouth once a week       dexamethasone 4 MG tablet    DECADRON    40 tablet    Take 10 tablets (40 mg) by mouth every 7 days Take daily on Days 1, 8, 15, and 22.       ferrous sulfate 325 (65 FE) MG tablet    IRON     Take 325 mg by mouth daily (with breakfast)       FREESTYLE LITE test strip   Generic drug:  blood glucose monitoring     300 strip    USE TO TEST FOUR TIMES DAILY       gemfibrozil 600 MG tablet    LOPID    180 tablet    TAKE 1 TABLET BY MOUTH TWICE DAILY       glipiZIDE 10 MG tablet    GLUCOTROL    180 tablet    TAKE 1 TABLET  BY MOUTH TWICE DAILY BEFORE A MEAL       insulin aspart 100 UNIT/ML injection    NovoLOG PEN    12 mL    Inject 1-6 Units Subcutaneous 4 times daily (with meals and nightly) Before meal correction: 140-225 - take 1 unit  226-310 - take 2 units 311-395 - take 3 units 396-480 - take 4 units >481 - take 5 units Bedtime correction of: 200-299 give 1 unit 300-399 take 2 units 400 or greater take 3 units       insulin detemir 100 UNIT/ML injection    LEVEMIR FLEXPEN/FLEXTOUCH    15 mL    Inject 12 Units Subcutaneous every morning (before breakfast)       levothyroxine 25 MCG tablet    SYNTHROID/LEVOTHROID    90 tablet    TAKE 1 TABLET BY MOUTH EVERY DAY.       lisinopril 30 MG tablet    PRINIVIL,ZESTRIL    90 tablet    TAKE 1 TABLET BY MOUTH EVERY DAY       metoprolol 25 MG tablet    LOPRESSOR    180 tablet    TAKE 1 TABLET BY MOUTH TWICE DAILY       omeprazole 20 MG CR capsule    priLOSEC    90 capsule    TAKE 1 CAPSULE BY MOUTH EVERY DAY       ondansetron 8 MG tablet    ZOFRAN    10 tablet    Take 1 tablet (8 mg) by mouth every 8 hours as needed (Nausea/Vomiting)       polyethylene glycol powder    MIRALAX/GLYCOLAX         prochlorperazine 5 MG tablet    COMPAZINE    30 tablet    Take 1 tablet (5 mg) by mouth every 6 hours as needed (Nausea/Vomiting)       Selenium 200 MCG Tabs      Take 100 mcg by mouth daily. Pt takes one half tab of the 200 mcg daily       tamsulosin 0.4 MG capsule    FLOMAX    90 capsule    TAKE 1 CAPSULE BY MOUTH DAILY       VITAMIN C PO      Take 1,000 mg by mouth daily       VITAMIN D3 PO      Take 1,000 Units by mouth daily       * Notice:  This list has 4 medication(s) that are the same as other medications prescribed for you. Read the directions carefully, and ask your doctor or other care provider to review them with you.

## 2017-05-17 NOTE — PROGRESS NOTES
Infusion Nursing Note:  Rocrosa Parkinson presents today for C6D8 velcade/zometa  Patient seen by provider today: Yes: Dr. Quinn   present during visit today: Not Applicable.    Note: N/A.    Intravenous Access:  Peripheral IV placed.    Treatment Conditions:  Lab Results   Component Value Date    HGB 7.5 05/17/2017     Lab Results   Component Value Date    WBC 2.1 05/17/2017      Lab Results   Component Value Date    ANEU 1.7 05/17/2017     Lab Results   Component Value Date    PLT 49 05/17/2017      Lab Results   Component Value Date     05/17/2017                   Lab Results   Component Value Date    POTASSIUM 5.3 05/17/2017           No results found for: MAG         Lab Results   Component Value Date    CR 0.94 05/17/2017                   Lab Results   Component Value Date    MICHELLE 9.0 05/17/2017                Lab Results   Component Value Date    BILITOTAL 0.5 05/17/2017           Lab Results   Component Value Date    ALBUMIN 2.9 05/17/2017                    Lab Results   Component Value Date    ALT 16 05/17/2017           Lab Results   Component Value Date    AST 11 05/17/2017     Results reviewed, labs MET treatment parameters, ok to proceed with treatment.  Blood transfusion consent signed 12/21/16      Post Infusion Assessment:  Patient tolerated infusion without incident.  Patient tolerated injection without incident.  Site patent and intact, free from redness, edema or discomfort.  No evidence of extravasations.  Access discontinued per protocol.    Discharge Plan:   AVS to patient via MYCHART.  Patient will return 5/24/17 for next appointment.   Patient discharged in stable condition accompanied by: self.  Departure Mode: Ambulatory.    Alfa Morgan RN

## 2017-05-17 NOTE — PROGRESS NOTES
"[facility]  HEMATOLOGY ONCOLOGY     DIAGNOSIS:    1- Kappa light chain IgM multiple myeloma in a 89-year-old patient.  Bone marrow biopsy on 11/17/2016 showed hypercellular bone marrow with 65% cellularity of light chain restricted plasma cells.  FISH or G-banding could not be performed due to insufficient sample.   There is a background of myelodysplastic syndrome.  The patient was initially seen in the hospital on 11/17/2016 for macrocytic anemia and pancytopenia and transfusion requirement and a bone marrow biopsy was performed.   2- History of prostate cancer s/p EBRT s/p brachytherapy in 2003 (recent PSA 0.10), superficial bladder cancer ,no recurrences since 1986     TREATMENT: 12/15/16 velcade/dex. 4/12/17 added cyclophosphamide 300 mg weekly.      SUBJECTIVE:  The patient was seen as a followup today. He has some ankel swelling. No neuropathy. No calf pain or swelling.     REVIEW OF SYSTEMS:  A complete review of systems was performed and found to be negative other than pertinent positives mentioned in history of present illness.     Past medical, social histories reviewed.    Meds- Reviewed.     PHYSICAL EXAMINATION:   VITAL SIGNS:/57  Pulse 71  Temp 98.1  F (36.7  C) (Oral)  Resp 16  Ht 1.778 m (5' 10\")  Wt 89.3 kg (196 lb 12.8 oz)  SpO2 98%  BMI 28.24 kg/m2  GENERAL: Sitting comfortably.   HEENT: Pupils are equal. Oropharynx is clear.   NECK: No cervical or supraclavicular lymphadenopathy.   LUNGS: Clear bilaterally.   HEART: S1, S2, regular.   ABDOMEN: Soft, nontender, nondistended, no hepatosplenomegaly.   EXTREMITIES: Warm, well perfused.   NEUROLOGIC: Alert, awake.   SKIN: No rash.   LYMPHATICS: Trace ankle edema.      LABORATORY DATA:   Recent Labs   Lab Test  05/17/17   0950  05/10/17   0925   NA  137  139   POTASSIUM  5.3  4.8   CHLORIDE  108  111*   CO2  20  19*   ANIONGAP  9  9   BUN  33*  40*   CR  0.94  0.87   GLC  272*  268*   MICHELLE  9.0  8.8     Recent Labs "   Lab Test  05/17/17   0950  05/10/17   0925  05/03/17   0945   WBC  2.1*  2.2*  3.3*   HGB  7.5*  6.8*  8.0*   PLT  49*  45*  43*   MCV  101*  102*  100   NEUTROPHIL  80.3  81.2  82.5     Recent Labs   Lab Test  05/17/17   0950  05/10/17   0925  04/19/17   0900  04/12/17   0910   11/17/16   0938   BILITOTAL  0.5  0.3   --   0.4   < >   --    ALKPHOS  68  77   --   67   < >   --    ALT  16  14   --   14   < >   --    AST  11  9   --   10   < >   --    ALBUMIN  2.9*  2.8*  2.8*  2.7*   < >   --    LDH   --    --    --    --    --   110    < > = values in this interval not displayed.     Results for MEDELLINJACOB (MRN [id number]) as of 5/17/2017 10:57   Ref. Range 2/8/2017 13:00 3/15/2017 09:30 5/10/2017 13:30   Beta Fraction Latest Ref Range: 0.6 - 1.0 g/dL 0.8 0.7 0.8   ELP Interpretation: Unknown Large monoclonal ... Large monoclonal ... Large monoclonal ...   Gamma Fraction Latest Ref Range: 0.7 - 1.6 g/dL 3.9 (H) 3.9 (H) 3.3 (H)   IGA Latest Ref Range: 70 - 380 mg/dL 26 (L) 11 (L)    IGG Latest Ref Range: 695 - 1620 mg/dL 378 (L) 382 (L)    IGM Latest Ref Range: 60 - 265 mg/dL 6470 (H) 6160 (H)    Immunofixation ELP Unknown (Note)... (Note)...    Kappa Free Lt Chain Latest Ref Range: 0.33 - 1.94 mg/dL 178.75 (H) 215.75 (H) 98.75 (H)   Kappa Lambda Ratio Latest Ref Range: 0.26 - 1.65  156.80 (H) 342.46 (H) 182.87 (H)   Lambda Free Lt Chain Latest Ref Range: 0.57 - 2.63 mg/dL 1.14 0.63 0.54 (L)   Monoclonal Peak Latest Ref Range: 0.0 g/dL 3.4 (H) 3.4 (H) 3.0 (H)     ECOG PS: 1    ASSESSMENT:  This is a 89-year-old gentleman who has kappa light chain multiple myeloma with hypercellular marrow with 65% of cells are light chain restricted plasma cells.  He has severe pancytopenia and has needed a transfusion in the hospital.  He did not have hypercalcemia and his renal function was normal. He has a background of myelodysplastic syndrome on his bone marrow biopsy; however, majority of the cell population was  monoclonal plasma cell population.   He was started on treatment due to cytopenia.   He is on weekly Velcade with dexamethasone 20 mg every week with every week labs and transfusion support at this time.   In 4/2017 his light chain levels were getting worse. M spike was stable. We added cyclophosphamide to the regimen.  - He continue to be cytopenic and this likely reflects background of MDS. We could not get cytogenetics from bone marrow due to low cellularity. He may need a follow up bone marrow.  - Labs were reviewed with the patient- myeloma appears to be responding to treatment.   - Leg wekaness may be resulting from myopathy from steroids. Ankle edema is likely a result of volume related to transfusions. He is seeing physical therapy soon.   - Role of Aranesp was discussed with the patient, risks and benefits including risk of thrombosis was discussed. He agrees to proceed- we will check for epo level and will start Aransep.      PLAN:   1.  Continue cyclophosphamide, Velcade and dexamethasone. Treatment today  2.  CBC, diff weekly- transfuse to keep hemoglobin of 8 and platelet of 10,000.   3.  Continue Zometa  4. Start Aranespt 300 mcg SQ every three weeks  5- RTC MD 3 weeks    ALISON JON MD    5/17/2017

## 2017-05-17 NOTE — PATIENT INSTRUCTIONS
1.  Continue cyclophosphamide, Velcade and dexamethasone. Treatment today  2.  CBC, diff weekly- transfuse to keep hemoglobin of 8 and platelet of 10,000.   3.  Continue Zometa  4. Start Aranesp 300 mcg SQ every three weeks  5- RTC MD 3 weeks  6- Draw EPO level today

## 2017-05-18 LAB — EPO SERPL-ACNC: 19

## 2017-05-22 ENCOUNTER — OFFICE VISIT (OUTPATIENT)
Dept: FAMILY MEDICINE | Facility: CLINIC | Age: 82
End: 2017-05-22
Payer: COMMERCIAL

## 2017-05-22 VITALS
DIASTOLIC BLOOD PRESSURE: 72 MMHG | WEIGHT: 196 LBS | RESPIRATION RATE: 15 BRPM | SYSTOLIC BLOOD PRESSURE: 130 MMHG | OXYGEN SATURATION: 97 % | BODY MASS INDEX: 28.12 KG/M2 | HEART RATE: 65 BPM | TEMPERATURE: 98.6 F

## 2017-05-22 DIAGNOSIS — H10.31 ACUTE CONJUNCTIVITIS OF RIGHT EYE, UNSPECIFIED ACUTE CONJUNCTIVITIS TYPE: ICD-10-CM

## 2017-05-22 DIAGNOSIS — I10 ESSENTIAL HYPERTENSION, BENIGN: ICD-10-CM

## 2017-05-22 DIAGNOSIS — E11.22 TYPE 2 DIABETES MELLITUS WITH DIABETIC CHRONIC KIDNEY DISEASE, UNSPECIFIED CKD STAGE, UNSPECIFIED LONG TERM INSULIN USE STATUS: Primary | ICD-10-CM

## 2017-05-22 DIAGNOSIS — N18.30 CKD (CHRONIC KIDNEY DISEASE) STAGE 3, GFR 30-59 ML/MIN (H): Chronic | ICD-10-CM

## 2017-05-22 DIAGNOSIS — Z13.89 SCREENING FOR DIABETIC PERIPHERAL NEUROPATHY: ICD-10-CM

## 2017-05-22 DIAGNOSIS — D63.0 ANEMIA IN NEOPLASTIC DISEASE: ICD-10-CM

## 2017-05-22 PROCEDURE — 99214 OFFICE O/P EST MOD 30 MIN: CPT | Performed by: FAMILY MEDICINE

## 2017-05-22 RX ORDER — TOBRAMYCIN 3 MG/ML
1 SOLUTION/ DROPS OPHTHALMIC EVERY 4 HOURS
Qty: 1 BOTTLE | Refills: 0 | Status: SHIPPED | OUTPATIENT
Start: 2017-05-22 | End: 2017-05-29

## 2017-05-22 NOTE — PROGRESS NOTES
SUBJECTIVE:                                                    Roc Parkinson is a 89 year old male who presents to clinic today for the following health issues:      Diabetes Follow-up    Patient is checking blood sugars: 4 times daily.       Results are as follows:        AM-sugars tend to be lower;67, 83, 74, 102, etc. And the evenings/afternoons can range from 110-250, 453 (highest)    Diabetic concerns: frequently over 200     Symptoms of hypoglycemia (low blood sugar): shaky, dizziness-only in the morning     Paresthesias (numbness or burning in feet) or sores: No     Date of last diabetic eye exam: 6 months-has one scheduled soon     Hypertension Follow-up      Outpatient blood pressures are not being checked.    Low Salt Diet: no added salt       Amount of exercise or physical activity: None    Problems taking medications regularly: No    Medication side effects: none    Diet: regular (no restrictions)          Problem list and histories reviewed & adjusted, as indicated.  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)     Irritable bowel syndrome     Vitamin D deficiency disease     Microalbuminuria     Obesity     UTI (urinary tract infection) w hx of recurrence     Iron deficiency anemia     Nonsmoker     A-fib (H)     Health Care Home     Hyperlipidemia     CKD (chronic kidney disease) stage 3, GFR 30-59 ml/min     Hypothyroidism     Long-term (current) use of anticoagulants [Z79.01]     Macrocytic anemia     GIB (gastrointestinal bleeding)     Multiple myeloma not having achieved remission (H)     Hypercalcemia     Hyperglycemia     Urinary tract infection     Hyperkalemia      ACP (advance care planning)     MDS (myelodysplastic syndrome) (H)     Past Surgical History:   Procedure Laterality Date     ARTHROPLASTY KNEE  11/5/2012    Procedure: ARTHROPLASTY KNEE;  RIGHT TOTAL KNEE ARTHROPLASTY (SMITH & NEPHEW)^;  Surgeon: Dickson Schulte MD;  Location:  OR     BIOPSY      bladder     COLONOSCOPY  2007     COLONOSCOPY N/A 11/15/2016    Procedure: COMBINED COLONOSCOPY, SINGLE OR MULTIPLE BIOPSY/POLYPECTOMY BY BIOPSY;  Surgeon: Kenneth Fulton MD;  Location:  GI     GENITOURINARY SURGERY      TURP x2 and bladder scraping     GI SURGERY      anal fistula     ORTHOPEDIC SURGERY      lt knee in nov.2009     SOFT TISSUE SURGERY      melanoma       Social History   Substance Use Topics     Smoking status: Never Smoker     Smokeless tobacco: Never Used     Alcohol use No     Family History   Problem Relation Age of Onset     Cardiovascular Father 81     heart arrythmia     Endocrine Disease Brother 74     Paget's           Reviewed and updated as needed this visit by clinical staff  Tobacco  Allergies  Meds  Med Hx  Surg Hx  Fam Hx  Soc Hx      Reviewed and updated as needed this visit by Provider         ROS:  CONSTITUTIONAL:NEGATIVE for fever, chills, change in weight  RESP:NEGATIVE for significant cough or SOB  CV: NEGATIVE for chest pain, palpitations or peripheral edema  ENDOCRINE: NEGATIVE for temperature intolerance, skin/hair changes, polydipsia, polyphagia and polyuria  EYE: right with mattering for the past month  OBJECTIVE:                                                    /72  Pulse 65  Temp 98.6  F (37  C) (Tympanic)  Resp 15  Wt 196 lb (88.9 kg)  SpO2 97%  BMI 28.12 kg/m2  Body mass index is 28.12 kg/(m^2).  GENERAL APPEARANCE: healthy, alert and no distress  EYE: right conjunctiva is red and there is discharge present       ASSESSMENT/PLAN:                                                        ICD-10-CM    1. Type 2 diabetes mellitus with diabetic  chronic kidney disease, unspecified CKD stage, unspecified long term insulin use status (H) E11.22 FOOT EXAM  NO CHARGE [96986.114]     Lipid panel reflex to direct LDL     Albumin Random Urine Quantitative     Hemoglobin A1c   2. Acute conjunctivitis of right eye, unspecified acute conjunctivitis type H10.31    3. Essential hypertension, benign I10    4. CKD (chronic kidney disease) stage 3, GFR 30-59 ml/min N18.3    5. 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 Screening for diabetic peripheral neuropathy Z13.89 FOOT EXAM  NO CHARGE [19913.114]       Patient Instructions   Will check fasting labs and see back pending review.      Dickson Reich MD  [facility]

## 2017-05-22 NOTE — NURSING NOTE
"Chief Complaint   Patient presents with     Hypertension     Diabetes       Initial /72  Pulse 65  Temp 98.6  F (37  C) (Tympanic)  Resp 15  Wt 196 lb (88.9 kg)  SpO2 97%  BMI 28.12 kg/m2 Estimated body mass index is 28.12 kg/(m^2) as calculated from the following:    Height as of 5/17/17: 5' 10\" (1.778 m).    Weight as of this encounter: 196 lb (88.9 kg).  Medication Reconciliation: complete  \  "

## 2017-05-22 NOTE — MR AVS SNAPSHOT
After Visit Summary   5/22/2017    Roc Parkinson    MRN: [id number]           Patient Information     Date Of Birth          7/7/1926        Visit Information        Provider Department      5/22/2017 10:30 AM Dickson Reich MD [facility]        Today's Diagnoses     Type 2 diabetes mellitus with diabetic chronic kidney disease, unspecified CKD stage, unspecified long term insulin use status (H)    -  1    Acute conjunctivitis of right eye, unspecified acute conjunctivitis type        Essential hypertension, benign        CKD (chronic kidney disease) stage 3, GFR 30-59 ml/min        Anemia in neoplastic disease        Screening for diabetic peripheral neuropathy          Care Instructions    Will check fasting labs and see back pending review. Will place on tobramycin eye drops.        Follow-ups after your visit        Your next 10 appointments already scheduled     May 23, 2017 10:30 AM CDT   LAB with BX LAB   [facility] ([facility])    [address] [place] [id number] [phone]           Patient must bring picture ID.  Patient should be prepared to give a urine specimen  Please do not eat 10-12 hours before your appointment if you are coming in fasting for labs on lipids, cholesterol, or glucose (sugar).  Pregnant women should follow their Care Team instructions. Water with medications is okay. Do not drink coffee or other fluids.   If you have concerns about taking  your medications, please ask at office or if scheduling via [employer], send a message by clicking on Secure Messaging, Message Your Care Team.            May 24, 2017  9:30 AM CDT   Level 5 with  INFUSION CHAIR 6   [facility] ([facility])    [facility]  [address]  [facility] [address]   [phone]            May 25,  2017  8:00 AM CDT   Cancer Rehab Eval with Jose Young, PT   [facility] ([facility])    [address]  [address]  [place] [address]   [phone]            May 31, 2017 10:00 AM CDT   Level 5 with SH INFUSION CHAIR 13   [facility] ([facility])    [facility] [place] [address]  [place] [address]   [phone]            Jun 02, 2017 12:30 PM CDT   Diabetic Education with  DIABETIC ED RESOURCE   [facility] ([facility])    [address] [id number] [phone]            Jun 07, 2017  9:00 AM CDT   Level 5 with SH INFUSION CHAIR 14   [facility] ([facility])    [facility] [place] [address]  [place] [address]   [phone]            Jun 07, 2017 10:15 AM CDT   Return Visit with Gretel Quinn MD   [facility] ([facility])    [facility] [place] [id number]  [place] [address]   [phone]            Jun 14, 2017  9:00 AM CDT   Level 5 with SH INFUSION CHAIR 17   [facility] ([facility])    [facility] [place] [id number]  [place] [address]   [phone]            Jun 21, 2017  9:00 AM CDT   Level 5 with SH INFUSION CHAIR 19   [facility] ([facility])    [facility]  [address]  [place] [address]   [phone]            Jun 28, 2017 10:15 AM CDT   Return Visit with Gretel Quinn MD   [facility] ([facility])    [facility] [place] [id number]  [place] [address]   [phone]              Future tests that were ordered for you today     Open Future Orders        Priority  "Expected Expires Ordered    FOOT EXAM  NO CHARGE [01022.114] Routine  2017    Lipid panel reflex to direct LDL Routine  2017    Albumin Random Urine Quantitative Routine  2017    Hemoglobin A1c Routine  2017            Who to contact     If you have questions or need follow up information about today's clinic visit or your schedule please contact [facility] directly at [phone].  Normal or non-critical lab and imaging results will be communicated to you by [employer], letter or phone within 4 business days after the clinic has received the results. If you do not hear from us within 7 days, please contact the clinic through [employer] or phone. If you have a critical or abnormal lab result, we will notify you by phone as soon as possible.  Submit refill requests through [employer] or call your pharmacy and they will forward the refill request to us. Please allow 3 business days for your refill to be completed.          Additional Information About Your Visit        [employer] Information     [employer] lets you send messages to your doctor, view your test results, renew your prescriptions, schedule appointments and more. To sign up, go to [email or web address] . Click on \"Log in\" on the left side of the screen, which will take you to the Welcome page. Then click on \"Sign up Now\" on the right side of the page.     You will be asked to enter the access code listed below, as well as some personal information. Please follow the directions to create your username and password.     Your access code is: [email or web address]  Expires: 2017 12:48 PM     Your access code will  in 90 days. If you need help or a new code, please call your [facility] clinic or [phone].        Care EveryWhere ID     This is your Care EveryWhere ID. This could be used by other organizations to access your [facility] medical records  [id number]        Your Vitals " Were     Pulse Temperature Respirations Pulse Oximetry BMI (Body Mass Index)       65 98.6  F (37  C) (Tympanic) 15 97% 28.12 kg/m2        Blood Pressure from Last 3 Encounters:   05/22/17 130/72   05/17/17 137/57   05/17/17 158/81    Weight from Last 3 Encounters:   05/22/17 196 lb (88.9 kg)   05/17/17 196 lb 12.8 oz (89.3 kg)   05/17/17 196 lb 12.8 oz (89.3 kg)                 Today's Medication Changes          These changes are accurate as of: 5/22/17  4:27 PM.  If you have any questions, ask your nurse or doctor.               Start taking these medicines.        Dose/Directions    tobramycin 0.3 % ophthalmic solution   Commonly known as:  TOBREX   Used for:  Acute conjunctivitis of right eye, unspecified acute conjunctivitis type   Started by:  Dickson Reich MD        Dose:  1 drop   Apply 1 drop to eye every 4 hours for 7 days   Quantity:  1 Bottle   Refills:  0            Where to get your medicines      These medications were sent to [email or web address] [id number] [place] [address] [address] AT [facility] [address]  [address], [facility] [address]    Hours:  24-hours Phone:  [phone]     tobramycin 0.3 % ophthalmic solution                Primary Care Provider Office Phone # Fax #    Dickson Reich [phone]       [facility] [address]  [facility] [address]        Thank you!     Thank you for choosing [facility]  for your care. Our goal is always to provide you with excellent care. Hearing back from our patients is one way we can continue to improve our services. Please take a few minutes to complete the written survey that you may receive in the mail after your visit with us. Thank you!             Your Updated Medication List - Protect others around you: Learn how to safely use, store and throw away your medicines at www.disposemymeds.org.          This list is accurate as of: 5/22/17  4:27 PM.   Always use your most recent med list.                   Brand Name Dispense Instructions for use    acyclovir 400 MG tablet    ZOVIRAX    60 tablet    Take 1 tablet (400 mg) by mouth 2 times daily Viral Prophylaxis.       amLODIPine 5 MG tablet    NORVASC    30 tablet    TAKE 1 TABLET BY MOUTH DAILY       * B-D U/F 31G X 8 MM   Generic drug:  insulin pen needle          * insulin pen needle 31G X 8 MM    B-D U/F    100 each    Use 5 pen needles daily or as directed.       clotrimazole-betamethasone cream    LOTRISONE    30 g    Apply topically to the head of the penis two times a day for up to 2 weeks.       * cyclophosphamide 50 MG capsule CHEMO    CYTOXAN    24 capsule    Take 6 capsules (300 mg) by mouth once a week       * cyclophosphamide 50 MG capsule CHEMO    CYTOXAN    24 capsule    Take 6 capsules (300 mg) by mouth once a week       dexamethasone 4 MG tablet    DECADRON    40 tablet    Take 10 tablets (40 mg) by mouth every 7 days Take daily on Days 1, 8, 15, and 22.       ferrous sulfate 325 (65 FE) MG tablet    IRON     Take 325 mg by mouth daily (with breakfast)       FREESTYLE LITE test strip   Generic drug:  blood glucose monitoring     300 strip    USE TO TEST FOUR TIMES DAILY       gemfibrozil 600 MG tablet    LOPID    180 tablet    TAKE 1 TABLET BY MOUTH TWICE DAILY       glipiZIDE 10 MG tablet    GLUCOTROL    180 tablet    TAKE 1 TABLET BY MOUTH TWICE DAILY BEFORE A MEAL       insulin aspart 100 UNIT/ML injection    NovoLOG PEN    12 mL    Inject 1-6 Units Subcutaneous 4 times daily (with meals and nightly) Before meal correction: 140-225 - take 1 unit  226-310 - take 2 units 311-395 - take 3 units 396-480 - take 4 units >481 - take 5 units Bedtime correction of: 200-299 give 1 unit 300-399 take 2 units 400 or greater take 3 units       insulin detemir 100 UNIT/ML injection    LEVEMIR FLEXPEN/FLEXTOUCH    15 mL    Inject 12 Units Subcutaneous every morning (before breakfast)       levothyroxine 25  MCG tablet    SYNTHROID/LEVOTHROID    90 tablet    TAKE 1 TABLET BY MOUTH EVERY DAY.       lisinopril 30 MG tablet    PRINIVIL,ZESTRIL    90 tablet    TAKE 1 TABLET BY MOUTH EVERY DAY       metoprolol 25 MG tablet    LOPRESSOR    180 tablet    TAKE 1 TABLET BY MOUTH TWICE DAILY       omeprazole 20 MG CR capsule    priLOSEC    90 capsule    TAKE 1 CAPSULE BY MOUTH EVERY DAY       ondansetron 8 MG tablet    ZOFRAN    10 tablet    Take 1 tablet (8 mg) by mouth every 8 hours as needed (Nausea/Vomiting)       polyethylene glycol powder    MIRALAX/GLYCOLAX         prochlorperazine 5 MG tablet    COMPAZINE    30 tablet    Take 1 tablet (5 mg) by mouth every 6 hours as needed (Nausea/Vomiting)       Selenium 200 MCG Tabs      Take 100 mcg by mouth daily. Pt takes one half tab of the 200 mcg daily       tamsulosin 0.4 MG capsule    FLOMAX    90 capsule    TAKE 1 CAPSULE BY MOUTH DAILY       tobramycin 0.3 % ophthalmic solution    TOBREX    1 Bottle    Apply 1 drop to eye every 4 hours for 7 days       VITAMIN C PO      Take 1,000 mg by mouth daily       VITAMIN D3 PO      Take 1,000 Units by mouth daily       * Notice:  This list has 4 medication(s) that are the same as other medications prescribed for you. Read the directions carefully, and ask your doctor or other care provider to review them with you.

## 2017-05-23 DIAGNOSIS — E11.22 TYPE 2 DIABETES MELLITUS WITH DIABETIC CHRONIC KIDNEY DISEASE, UNSPECIFIED CKD STAGE, UNSPECIFIED LONG TERM INSULIN USE STATUS: ICD-10-CM

## 2017-05-23 LAB
CHOLEST SERPL-MCNC: 123 MG/DL
HBA1C MFR BLD: 8 % (ref 4.3–6)
HDLC SERPL-MCNC: 35 MG/DL
LDLC SERPL CALC-MCNC: 64 MG/DL
NONHDLC SERPL-MCNC: 88 MG/DL
TRIGL SERPL-MCNC: 120 MG/DL

## 2017-05-23 PROCEDURE — 36415 COLL VENOUS BLD VENIPUNCTURE: CPT | Performed by: FAMILY MEDICINE

## 2017-05-23 PROCEDURE — 80061 LIPID PANEL: CPT | Performed by: FAMILY MEDICINE

## 2017-05-23 PROCEDURE — 83036 HEMOGLOBIN GLYCOSYLATED A1C: CPT | Performed by: FAMILY MEDICINE

## 2017-05-24 ENCOUNTER — INFUSION THERAPY VISIT (OUTPATIENT)
Dept: INFUSION THERAPY | Facility: CLINIC | Age: 82
End: 2017-05-24
Attending: INTERNAL MEDICINE
Payer: MEDICARE

## 2017-05-24 ENCOUNTER — HOSPITAL ENCOUNTER (OUTPATIENT)
Facility: CLINIC | Age: 82
Setting detail: SPECIMEN
Discharge: HOME | End: 2017-05-24
Attending: INTERNAL MEDICINE | Admitting: INTERNAL MEDICINE
Payer: MEDICARE

## 2017-05-24 VITALS
WEIGHT: 198.4 LBS | RESPIRATION RATE: 16 BRPM | HEIGHT: 70 IN | TEMPERATURE: 96.7 F | BODY MASS INDEX: 28.4 KG/M2 | HEART RATE: 64 BPM | DIASTOLIC BLOOD PRESSURE: 85 MMHG | SYSTOLIC BLOOD PRESSURE: 151 MMHG | OXYGEN SATURATION: 98 %

## 2017-05-24 DIAGNOSIS — D46.9 MDS (MYELODYSPLASTIC SYNDROME) (H): ICD-10-CM

## 2017-05-24 DIAGNOSIS — D53.9 MACROCYTIC ANEMIA: ICD-10-CM

## 2017-05-24 DIAGNOSIS — C90.00 MULTIPLE MYELOMA NOT HAVING ACHIEVED REMISSION (H): Primary | ICD-10-CM

## 2017-05-24 LAB
ABO + RH BLD: NORMAL
ABO + RH BLD: NORMAL
BASOPHILS # BLD AUTO: 0 10E9/L (ref 0–0.2)
BASOPHILS NFR BLD AUTO: 0 %
BLD GP AB SCN SERPL QL: NORMAL
BLD PROD TYP BPU: NORMAL
BLD PROD TYP BPU: NORMAL
BLD UNIT ID BPU: 0
BLOOD BANK CMNT PATIENT-IMP: NORMAL
BLOOD PRODUCT CODE: NORMAL
BPU ID: NORMAL
DIFFERENTIAL METHOD BLD: ABNORMAL
EOSINOPHIL # BLD AUTO: 0 10E9/L (ref 0–0.7)
EOSINOPHIL NFR BLD AUTO: 0.3 %
ERYTHROCYTE [DISTWIDTH] IN BLOOD BY AUTOMATED COUNT: 23 % (ref 10–15)
HCT VFR BLD AUTO: 23.6 % (ref 40–53)
HGB BLD-MCNC: 7.9 G/DL (ref 13.3–17.7)
IMM GRANULOCYTES # BLD: 0 10E9/L (ref 0–0.4)
IMM GRANULOCYTES NFR BLD: 0.3 %
LYMPHOCYTES # BLD AUTO: 0.4 10E9/L (ref 0.8–5.3)
LYMPHOCYTES NFR BLD AUTO: 12.4 %
MCH RBC QN AUTO: 33.9 PG (ref 26.5–33)
MCHC RBC AUTO-ENTMCNC: 33.5 G/DL (ref 31.5–36.5)
MCV RBC AUTO: 101 FL (ref 78–100)
MONOCYTES # BLD AUTO: 0.1 10E9/L (ref 0–1.3)
MONOCYTES NFR BLD AUTO: 3.4 %
NEUTROPHILS # BLD AUTO: 2.4 10E9/L (ref 1.6–8.3)
NEUTROPHILS NFR BLD AUTO: 83.6 %
NUM BPU REQUESTED: 1
PLATELET # BLD AUTO: 62 10E9/L (ref 150–450)
RBC # BLD AUTO: 2.33 10E12/L (ref 4.4–5.9)
SPECIMEN EXP DATE BLD: NORMAL
TRANSFUSION STATUS PATIENT QL: NORMAL
TRANSFUSION STATUS PATIENT QL: NORMAL
WBC # BLD AUTO: 2.9 10E9/L (ref 4–11)

## 2017-05-24 PROCEDURE — P9016 RBC LEUKOCYTES REDUCED: HCPCS | Performed by: INTERNAL MEDICINE

## 2017-05-24 PROCEDURE — 85025 COMPLETE CBC W/AUTO DIFF WBC: CPT | Performed by: INTERNAL MEDICINE

## 2017-05-24 PROCEDURE — 96409 CHEMO IV PUSH SNGL DRUG: CPT

## 2017-05-24 PROCEDURE — 86850 RBC ANTIBODY SCREEN: CPT | Performed by: INTERNAL MEDICINE

## 2017-05-24 PROCEDURE — 86900 BLOOD TYPING SEROLOGIC ABO: CPT | Performed by: INTERNAL MEDICINE

## 2017-05-24 PROCEDURE — 25000128 H RX IP 250 OP 636: Mod: JW | Performed by: INTERNAL MEDICINE

## 2017-05-24 PROCEDURE — 96372 THER/PROPH/DIAG INJ SC/IM: CPT | Mod: XS

## 2017-05-24 PROCEDURE — 86901 BLOOD TYPING SEROLOGIC RH(D): CPT | Performed by: INTERNAL MEDICINE

## 2017-05-24 PROCEDURE — 86923 COMPATIBILITY TEST ELECTRIC: CPT | Performed by: INTERNAL MEDICINE

## 2017-05-24 RX ORDER — FUROSEMIDE 20 MG
20 TABLET ORAL DAILY
Qty: 30 TABLET | Refills: 3 | Status: SHIPPED | OUTPATIENT
Start: 2017-05-24 | End: 2017-01-01

## 2017-05-24 RX ADMIN — BORTEZOMIB 2.1 MG: 3.5 INJECTION, POWDER, LYOPHILIZED, FOR SOLUTION INTRAVENOUS; SUBCUTANEOUS at 11:49

## 2017-05-24 RX ADMIN — DARBEPOETIN ALFA 300 MCG: 300 INJECTION, SOLUTION INTRAVENOUS; SUBCUTANEOUS at 11:53

## 2017-05-24 ASSESSMENT — PAIN SCALES - GENERAL: PAINLEVEL: NO PAIN (0)

## 2017-05-24 NOTE — MR AVS SNAPSHOT
After Visit Summary   5/24/2017    Roc Parkinson    MRN: [id number]           Patient Information     Date Of Birth          7/7/1926        Visit Information        Provider Department      5/24/2017 9:30 AM  INFUSION CHAIR 6 [facility]        Today's Diagnoses     Multiple myeloma not having achieved remission (H)    -  1    Macrocytic anemia        MDS (myelodysplastic syndrome) (H)           Follow-ups after your visit        Your next 10 appointments already scheduled     May 25, 2017  8:00 AM CDT   Cancer Rehab Eval with Jose Young, PT   [facility] ([facility])    [address]  [address]  [place] [address]   [phone]            May 31, 2017 10:00 AM CDT   Level 5 with  INFUSION CHAIR 13   [facility] ([facility])    [facility]  [address]  [place] [address]   [phone]            Jun 02, 2017 12:30 PM CDT   Diabetic Education with  DIABETIC ED RESOURCE   [facility] ([facility])    [address] [address]   [phone]            Jun 07, 2017  9:00 AM CDT   Level 5 with  INFUSION CHAIR 14   [facility] ([facility])    [facility]  [address]  [place] [address]   [phone]            Jun 07, 2017 10:15 AM CDT   Return Visit with Gretel Quinn MD   [facility] ([facility])    [facility]  [address]  [place] [address]   [phone]            Jun 14, 2017  9:00 AM CDT   Level 5 with  INFUSION CHAIR 17   [facility] ([facility])    [facility]  [address]  [place] [address]   [phone]            Jun 21, 2017   9:00 AM CDT   Level 5 with  INFUSION CHAIR 19   [facility] ([facility])    [facility] [place]  [address]  [place] [address]   [phone]            Jun 28, 2017  9:00 AM CDT   Level 5 with  INFUSION CHAIR 14   [facility] ([facility])    [facility] [place]  [address]  [place] [address]   [phone]            Jun 28, 2017 10:15 AM CDT   Return Visit with Gretel Quinn MD   [facility] ([facility])    [facility] [place]  [address]  [place] [address] [phone]              Future tests that were ordered for you today     Open Standing Orders        Priority Remaining Interval Expires Ordered    Red blood cell prepare order unit Routine 99/100 CONDITIONAL (SPECIFY) BLOOD  5/24/2017    Transfuse red blood cell unit Routine 99/100 TRANSFUSE 1 UNIT  5/24/2017            Who to contact     If you have questions or need follow up information about today's clinic visit or your schedule please contact [facility] directly at [phone].  Normal or non-critical lab and imaging results will be communicated to you by [employer], letter or phone within 4 business days after the clinic has received the results. If you do not hear from us within 7 days, please contact the clinic through [employer] or phone. If you have a critical or abnormal lab result, we will notify you by phone as soon as possible.  Submit refill requests through [employer] or call your pharmacy and they will forward the refill request to us. Please allow 3 business days for your refill to be completed.          Additional Information About Your Visit        [employer] Information     [employer] lets you send messages to your doctor, view your test results, renew your prescriptions, schedule appointments and more. To  "sign up, go to [email or web address] . Click on \"Log in\" on the left side of the screen, which will take you to the Welcome page. Then click on \"Sign up Now\" on the right side of the page.     You will be asked to enter the access code listed below, as well as some personal information. Please follow the directions to create your username and password.     Your access code is: [email or web address]  Expires: 2017 12:48 PM     Your access code will  in 90 days. If you need help or a new code, please call your [facility] clinic or [phone].        Care EveryWhere ID     This is your Care EveryWhere ID. This could be used by other organizations to access your [facility] medical records  [id number]        Your Vitals Were     Pulse Temperature Respirations Height Pulse Oximetry BMI (Body Mass Index)    64 96.7  F (35.9  C) (Oral) 16 1.778 m (5' 10\") 98% 28.47 kg/m2       Blood Pressure from Last 3 Encounters:   17 151/85   17 130/72   17 137/57    Weight from Last 3 Encounters:   17 90 kg (198 lb 6.4 oz)   17 88.9 kg (196 lb)   17 89.3 kg (196 lb 12.8 oz)              We Performed the Following     ABO/Rh type and screen     Blood component     CBC with platelets differential     Transfuse red blood cell unit     Treatment Conditions 2          Today's Medication Changes          These changes are accurate as of: 17  2:35 PM.  If you have any questions, ask your nurse or doctor.               Start taking these medicines.        Dose/Directions    furosemide 20 MG tablet   Commonly known as:  LASIX   Used for:  Multiple myeloma not having achieved remission (H)        Dose:  20 mg   Take 1 tablet (20 mg) by mouth daily   Quantity:  30 tablet   Refills:  3            Where to get your medicines      These medications were sent to [facility] ROBINA Bernal - [address]  [address] [address]     Phone:  [phone]     furosemide 20 " MG tablet                Primary Care Provider Office Phone # Fax #    Dickson Reich [phone]       [facility] [address]  [facility] [address]        Thank you!     Thank you for choosing [facility]  for your care. Our goal is always to provide you with excellent care. Hearing back from our patients is one way we can continue to improve our services. Please take a few minutes to complete the written survey that you may receive in the mail after your visit with us. Thank you!             Your Updated Medication List - Protect others around you: Learn how to safely use, store and throw away your medicines at www.disposemymeds.org.          This list is accurate as of: 5/24/17  2:35 PM.  Always use your most recent med list.                   Brand Name Dispense Instructions for use    acyclovir 400 MG tablet    ZOVIRAX    60 tablet    Take 1 tablet (400 mg) by mouth 2 times daily Viral Prophylaxis.       amLODIPine 5 MG tablet    NORVASC    30 tablet    TAKE 1 TABLET BY MOUTH DAILY       * B-D U/F 31G X 8 MM   Generic drug:  insulin pen needle          * insulin pen needle 31G X 8 MM    B-D U/F    100 each    Use 5 pen needles daily or as directed.       clotrimazole-betamethasone cream    LOTRISONE    30 g    Apply topically to the head of the penis two times a day for up to 2 weeks.       cyclophosphamide 50 MG capsule CHEMO    CYTOXAN    24 capsule    Take 6 capsules (300 mg) by mouth once a week       dexamethasone 4 MG tablet    DECADRON    40 tablet    Take 10 tablets (40 mg) by mouth every 7 days Take daily on Days 1, 8, 15, and 22.       ferrous sulfate 325 (65 FE) MG tablet    IRON     Take 325 mg by mouth daily (with breakfast)       FREESTYLE LITE test strip   Generic drug:  blood glucose monitoring     300 strip    USE TO TEST FOUR TIMES DAILY       furosemide 20 MG tablet    LASIX    30 tablet    Take 1 tablet (20 mg) by  mouth daily       gemfibrozil 600 MG tablet    LOPID    180 tablet    TAKE 1 TABLET BY MOUTH TWICE DAILY       glipiZIDE 10 MG tablet    GLUCOTROL    180 tablet    TAKE 1 TABLET BY MOUTH TWICE DAILY BEFORE A MEAL       insulin aspart 100 UNIT/ML injection    NovoLOG PEN    12 mL    Inject 1-6 Units Subcutaneous 4 times daily (with meals and nightly) Before meal correction: 140-225 - take 1 unit  226-310 - take 2 units 311-395 - take 3 units 396-480 - take 4 units >481 - take 5 units Bedtime correction of: 200-299 give 1 unit 300-399 take 2 units 400 or greater take 3 units       insulin detemir 100 UNIT/ML injection    LEVEMIR FLEXPEN/FLEXTOUCH    15 mL    Inject 12 Units Subcutaneous every morning (before breakfast)       levothyroxine 25 MCG tablet    SYNTHROID/LEVOTHROID    90 tablet    TAKE 1 TABLET BY MOUTH EVERY DAY.       lisinopril 30 MG tablet    PRINIVIL,ZESTRIL    90 tablet    TAKE 1 TABLET BY MOUTH EVERY DAY       metoprolol 25 MG tablet    LOPRESSOR    180 tablet    TAKE 1 TABLET BY MOUTH TWICE DAILY       omeprazole 20 MG CR capsule    priLOSEC    90 capsule    TAKE 1 CAPSULE BY MOUTH EVERY DAY       ondansetron 8 MG tablet    ZOFRAN    10 tablet    Take 1 tablet (8 mg) by mouth every 8 hours as needed (Nausea/Vomiting)       polyethylene glycol powder    MIRALAX/GLYCOLAX         prochlorperazine 5 MG tablet    COMPAZINE    30 tablet    Take 1 tablet (5 mg) by mouth every 6 hours as needed (Nausea/Vomiting)       Selenium 200 MCG Tabs      Take 100 mcg by mouth daily. Pt takes one half tab of the 200 mcg daily       tamsulosin 0.4 MG capsule    FLOMAX    90 capsule    TAKE 1 CAPSULE BY MOUTH DAILY       tobramycin 0.3 % ophthalmic solution    TOBREX    1 Bottle    Apply 1 drop to eye every 4 hours for 7 days       VITAMIN C PO      Take 1,000 mg by mouth daily       VITAMIN D3 PO      Take 1,000 Units by mouth daily       * Notice:  This list has 2 medication(s) that are the same as other medications  prescribed for you. Read the directions carefully, and ask your doctor or other care provider to review them with you.

## 2017-05-24 NOTE — PROGRESS NOTES
Infusion Nursing Note:  Roc Parkinson presents today for C6 D15 Velcade.    Patient seen by provider today: No   present during visit today: Not Applicable.    Note: Patient feeling well, reports no new concerns since exam with Dr. Quinn last week. Continues with mild BLE edema, improved since last week per patient. Reviewed above and labs today with Dr. Quinn, orders as follows:    Ok to proceed with velcade today with plts 62,000  Start aranesp today as ordered  Start lasix 20mg PO daily  Proceed with 1 unit PRBCs today as ordered    Reviewed above with patient, agreeable with plan. Given printed info on aranesp from [employer]. Patient verbalizes understanding of correct dosing/schedule of oral dexamethasone and cytoxan as ordered. Does not need refill at this time.      Intravenous Access:  Labs drawn without difficulty.  Peripheral IV placed.    Treatment Conditions:  Lab Results   Component Value Date    HGB 7.9 05/24/2017     Lab Results   Component Value Date    WBC 2.9 05/24/2017      Lab Results   Component Value Date    ANEU 2.4 05/24/2017     Lab Results   Component Value Date    PLT 62 05/24/2017      Results reviewed, labs did NOT meet treatment parameters for velcade, see orders above. Did meet parameters for 1 unit PRBCs.  Blood transfusion consent signed 12/21/16.      Post Infusion Assessment:  Patient tolerated infusion without incident.  Blood return noted pre and post infusion.  Site patent and intact, free from redness, edema or discomfort.  No evidence of extravasations.  Access discontinued per protocol.    Discharge Plan:   Prescription refills given for lasix.  Discharge instructions reviewed with: Patient.  Patient and/or family verbalized understanding of discharge instructions and all questions answered.  Copy of AVS reviewed with patient and/or family.  Patient will return 5/31 for next appointment.  Patient discharged in stable condition accompanied by: self.  Departure  Mode: Ambulatory.  Face to Face time: 10 minutes.    Eliana Carpenter RN

## 2017-05-25 ENCOUNTER — HOSPITAL ENCOUNTER (OUTPATIENT)
Dept: PHYSICAL THERAPY | Facility: CLINIC | Age: 82
Setting detail: THERAPIES SERIES
End: 2017-05-25
Attending: INTERNAL MEDICINE
Payer: MEDICARE

## 2017-05-25 PROCEDURE — 97162 PT EVAL MOD COMPLEX 30 MIN: CPT | Mod: GP | Performed by: PHYSICAL THERAPIST

## 2017-05-25 PROCEDURE — G8979 MOBILITY GOAL STATUS: HCPCS | Mod: GP,CI | Performed by: PHYSICAL THERAPIST

## 2017-05-25 PROCEDURE — 97110 THERAPEUTIC EXERCISES: CPT | Mod: GP | Performed by: PHYSICAL THERAPIST

## 2017-05-25 PROCEDURE — 40000360 ZZHC STATISTIC PT CANCER REHAB VISIT: Performed by: PHYSICAL THERAPIST

## 2017-05-25 PROCEDURE — G8978 MOBILITY CURRENT STATUS: HCPCS | Mod: GP,CJ | Performed by: PHYSICAL THERAPIST

## 2017-05-25 ASSESSMENT — 6 MINUTE WALK TEST (6MWT)
TOTAL DISTANCE WALKED (FT): 900
TOTAL DISTANCE WALKED (METERS): 275

## 2017-05-25 NOTE — PROGRESS NOTES
[facility]        OUTPATIENT PHYSICAL THERAPY FUNCTIONAL EVALUATION  PLAN OF TREATMENT FOR OUTPATIENT REHABILITATION  (COMPLETE FOR INITIAL CLAIMS ONLY)  Patient's Last Name, First Name, M.I.  YOB: 1926  TeshaRoc  RICARDO     Provider's Name   [facility]   Medical Record No.  [id number]     Start of Care Date:  05/25/17   Onset Date:  11/17/16   Type:     _X__PT   ____OT  ____SLP Medical Diagnosis:  Multiple myeloma not having achieved remission     PT Diagnosis:  Impaired activity tolerance, generalized weakness Visits from SOC:  1                              __________________________________________________________________________________  Plan of Treatment/Functional Goals:  gait training, balance training, strengthening, neuromuscular re-education           GOALS  6MWT  The client will improve 6MWT by at elast 70 meters for marked improvement in activity tolerance and gait speed  08/22/17    FGA  The client will score at least 22/30 on FGA for improved dynamic balance  08/22/17    Sit<>stand reps  The client will improve sit<>stand reps in 30 seconds by 4 reps for improved functional strength  07/23/17                 Therapy Frequency:  1 time/week (decreasing to every other week after 6 weeks)   Predicted Duration of Therapy Intervention:  90 days    Jose Young, PT                                    I CERTIFY THE NEED FOR THESE SERVICES FURNISHED UNDER        THIS PLAN OF TREATMENT AND WHILE UNDER MY CARE     (Physician co-signature of this document indicates review and certification of the therapy plan).                Certification Date From:  05/25/17   Certification Date To:  08/22/17    Referring Provider:  Dr. Quinn    Initial Assessment  See Epic Evaluation- Start of Care Date: 05/25/17

## 2017-05-25 NOTE — PROGRESS NOTES
05/25/17 0700   Quick Adds   Quick Adds Certification   Type of Visit Initial OP PT Evaluation   General Information   Start of Care Date 05/25/17   Referring Physician Dr. Quinn   Orders Evaluate and Treat as Indicated   Order Date 05/10/17   Medical Diagnosis Multiple myeloma not having achieved remission   Onset of illness/injury or Date of Surgery 11/17/16   Precautions/Limitations no known precautions/limitations   Special Instructions Cancer Rehab program   Surgical/Medical history reviewed Yes   Pertinent history of current problem (include personal factors and/or comorbidities that impact the POC) This is a 89-year-old gentleman who has kappa light chain multiple myeloma, diagnosed in 11/2016.  Undergoing weekly oral chemo at this time. Reports his main complaints are related to progressive leg weakness and difficulty walking. Reports fatigues quickly with all movement.  Denies any N/T in his feet.    Prior level of function comment Was going to the [facility] prior to his diagnosis,  liked to do exercise machines for strengthening.  Very little exercise right now.    Patient role/Employment history Retired   Living environment Apartment/condo   Home/Community Accessibility Comments Senior living, no concerns   Patient/Family Goals Statement Improve leg strength   Fall Risk Screen   Fall screen completed by PT   Per patient - Fall 2 or more times in past year? No   Per patient - Fall with injury in past year? No   System Outcome Measures   Outcome Measures Cancer Rehab   FACIT Fatigue Subscale (score out of 52). The higher the score, the better the QOL. 41   Six Minute Walk (meters). An increase of 70 or more meters indicates statistically significant change. 275   Pain   Patient currently in pain No   Cognitive Status Examination   Orientation orientation to person, place and time   Level of Consciousness alert   Follows Commands and Answers Questions 100% of the time   Personal Safety and Judgment intact    Memory intact   Integumentary   Integumentary No deficits were identified   Posture   Posture Forward head position   Range of Motion (ROM)   ROM Comment WFL throughout LEs   Strength   Strength Comments Grossly 4/5 in all MMT positions   Bed Mobility   Bed Mobility Comments independent   Gait   Gait Comments Ambulates with scissoring gait, increased ankle supination   Gait Special Tests   Gait Special Tests SIX MINUTE WALK TEST;DYNAMIC GAIT INDEX   Gait Special Tests Six Minute Walk Test   Feet 900 Feet   Comments stopped at 3:45 for 1 minute   Balance Special Tests   Balance Special Tests Romberg;Sharpened Romberg;Single leg stance right;Single leg stance left   Balance Special Tests Single Leg Stance Right,   Right, seconds 2 Seconds   Balance Special Tests Single Leg Stance Left   Left, seconds 1 Seconds   Balance Special Tests Romberg   Seconds 30 Seconds   Comments Increased sway with eyes closed   Balance Special Tests Sharpened Romberg   Seconds 0 Seconds   Comments Unable   Sensory Examination   Sensory Perception Comments Not formally assessed but denies N/T   Coordination   Coordination no deficits were identified   Muscle Tone   Muscle Tone no deficits were identified   Planned Therapy Interventions   Planned Therapy Interventions gait training;balance training;strengthening;neuromuscular re-education   Clinical Impression   Criteria for Skilled Therapeutic Interventions Met yes, treatment indicated   PT Diagnosis Impaired activity tolerance, generalized weakness   Influenced by the following impairments Lower extremity weakness, impaired static and dynamic balance,    Functional limitations due to impairments Decreased tolerance for community mobility   Clinical Presentation Evolving/Changing   Clinical Presentation Rationale Continued chemo   Clinical Decision Making (Complexity) Moderate complexity   Therapy Frequency 1 time/week  (decreasing to every other week after 6 weeks)   Predicted Duration  of Therapy Intervention (days/wks) 90 days   Risk & Benefits of therapy have been explained Yes   Patient, Family & other staff in agreement with plan of care Yes   Education Assessment   Barriers to Learning No barriers   GOALS   PT Eval Goals 1;2;3   Goal 1   Goal Identifier 6MWT   Goal Description The client will improve 6MWT by at elast 70 meters for marked improvement in activity tolerance and gait speed   Target Date 08/22/17   Goal 2   Goal Identifier FGA   Goal Description The client will score at least 22/30 on FGA for improved dynamic balance   Target Date 08/22/17   Goal 3   Goal Identifier Sit<>stand reps   Goal Description The client will improve sit<>stand reps in 30 seconds by 4 reps for improved functional strength   Target Date 07/23/17   Total Evaluation Time   Total Evaluation Time (Minutes) 30   Therapy Certification   Certification date from 05/25/17   Certification date to 08/22/17   Medical Diagnosis Multiple myeloma not having achieved remission

## 2017-05-31 NOTE — PROGRESS NOTES
Infusion Nursing Note:  Roc Parkinson presents today for labs/Velcade/possible transfusion   Patient seen by provider today: No   present during visit today: Not Applicable.    Note: N/A.    Intravenous Access:  Peripheral IV placed.    Treatment Conditions:  Lab Results   Component Value Date    HGB 9.8 05/31/2017     Lab Results   Component Value Date    WBC 2.9 05/31/2017      Lab Results   Component Value Date    ANEU 2.0 05/31/2017     Lab Results   Component Value Date    PLT 78 05/31/2017      Lab Results   Component Value Date     05/17/2017                   Lab Results   Component Value Date    POTASSIUM 5.3 05/17/2017           No results found for: MAG         Lab Results   Component Value Date    CR 0.94 05/17/2017                   Lab Results   Component Value Date    MICHELLE 9.0 05/17/2017                Lab Results   Component Value Date    BILITOTAL 0.5 05/17/2017           Lab Results   Component Value Date    ALBUMIN 2.9 05/17/2017                    Lab Results   Component Value Date    ALT 16 05/17/2017           Lab Results   Component Value Date    AST 11 05/17/2017     Results reviewed, labs MET treatment parameters, ok to proceed with treatment---Velcade  Results reviewed, labs did NOT meet treatment parameters: for blood transfusion.      Post Infusion Assessment:  Patient tolerated injection without incident.  Site patent and intact, free from redness, edema or discomfort.  Access discontinued per protocol.    Discharge Plan:   Discharge instructions reviewed with: Patient.  Patient and/or family verbalized understanding of discharge instructions and all questions answered.  Copy of AVS reviewed with patient and/or family.  Patient will return 6/7 for next appointment.  Patient discharged in stable condition accompanied by: self.    Keya Arias RN

## 2017-05-31 NOTE — MR AVS SNAPSHOT
After Visit Summary   5/31/2017    Roc Parkinson    MRN: [id number]           Patient Information     Date Of Birth          7/7/1926        Visit Information        Provider Department      5/31/2017 10:00 AM SH INFUSION CHAIR 13 [facility]        Today's Diagnoses     Multiple myeloma not having achieved remission (H)    -  1       Follow-ups after your visit        Your next 10 appointments already scheduled     Jun 02, 2017 12:30 PM CDT   Diabetic Education with  DIABETIC ED RESOURCE   [facility] ([facility])    [address] [address]   [phone]            Jun 06, 2017  7:30 AM CDT   Treatment with Jose Lordt, PT   [facility] ([facility])    [address] [place] [address]  [address]  [facility] [address]   [phone]            Jun 07, 2017  9:00 AM CDT   Level 5 with  INFUSION CHAIR 14   [facility] ([facility])    [facility]  [address]  [facility] [address]   [phone]            Jun 07, 2017 10:15 AM CDT   Return Visit with Gretel Quinn MD   [facility] ([facility])    [facility]  [address]  [facility] [address]   [phone]            Jun 13, 2017  7:30 AM CDT   Treatment with Rosalva Martinez, PT   [facility] ([facility])    [address]  [address]  [facility] [address]   [phone]            Jun 14, 2017  9:00 AM CDT   Level 5 with  INFUSION CHAIR 17   [facility] ([facility])    [facility]  [address]  [facility] [address]   [phone]            Jun 20, 2017  8:15 AM CDT   Treatment with Jose Hannah, PT   [facility] ([facility])  "   [address]  [address]  [place] [address]   [phone]            Jun 21, 2017  9:00 AM CDT   Level 5 with SH INFUSION CHAIR 19   [facility] ([facility])    [facility]  [address]  [place] [address]   [phone]            Jun 27, 2017  8:15 AM CDT   Treatment with Jose Young, PT   [facility] ([facility])    [address] [place] [address]  [address]  [place] [address]   [phone]            Jun 28, 2017 10:15 AM CDT   Return Visit with Gretel Quinn MD   [facility] ([facility])    [facility]  [address]  [place] [address]   [phone]              Who to contact     If you have questions or need follow up information about today's clinic visit or your schedule please contact [facility] directly at [phone].  Normal or non-critical lab and imaging results will be communicated to you by [employer] logohart, letter or phone within 4 business days after the clinic has received the results. If you do not hear from us within 7 days, please contact the clinic through [employer] logohart or phone. If you have a critical or abnormal lab result, we will notify you by phone as soon as possible.  Submit refill requests through [employer] or call your pharmacy and they will forward the refill request to us. Please allow 3 business days for your refill to be completed.          Additional Information About Your Visit        [employer] logoh[employer] Information     [employer] lets you send messages to your doctor, view your test results, renew your prescriptions, schedule appointments and more. To sign up, go to [email or web address] . Click on \"Log in\" on the left side of the screen, which will take you to the Welcome page. Then click on \"Sign up Now\" on the right side of the page.     You will be asked to enter the access code listed below, as " "well as some personal information. Please follow the directions to create your username and password.     Your access code is: [email or web address]  Expires: 2017 12:48 PM     Your access code will  in 90 days. If you need help or a new code, please call your [place] clinic or [phone].        Care EveryWhere ID     This is your Care EveryWhere ID. This could be used by other organizations to access your [place] medical records  [id number]        Your Vitals Were     Pulse Temperature Respirations Height BMI (Body Mass Index)       64 98.2  F (36.8  C) (Oral) 16 1.778 m (5' 10\") 28.09 kg/m2        Blood Pressure from Last 3 Encounters:   17 122/62   17 151/85   17 130/72    Weight from Last 3 Encounters:   17 88.8 kg (195 lb 12.8 oz)   17 90 kg (198 lb 6.4 oz)   17 88.9 kg (196 lb)              We Performed the Following     CBC with platelets differential     Comprehensive metabolic panel     Treatment Conditions 2        Primary Care Provider Office Phone # Fax #    Dickson Reich [phone]       [facility] [address] [facility] [address]        Thank you!     Thank you for choosing [facility]  for your care. Our goal is always to provide you with excellent care. Hearing back from our patients is one way we can continue to improve our services. Please take a few minutes to complete the written survey that you may receive in the mail after your visit with us. Thank you!             Your Updated Medication List - Protect others around you: Learn how to safely use, store and throw away your medicines at www.disposemymeds.org.          This list is accurate as of: 17 11:18 AM.  Always use your most recent med list.                   Brand Name Dispense Instructions for use    acyclovir 400 MG tablet    ZOVIRAX    60 tablet    Take 1 tablet (400 mg) by mouth 2 times daily Viral " Prophylaxis.       amLODIPine 5 MG tablet    NORVASC    30 tablet    TAKE 1 TABLET BY MOUTH DAILY       * B-D U/F 31G X 8 MM   Generic drug:  insulin pen needle          * insulin pen needle 31G X 8 MM    B-D U/F    100 each    Use 5 pen needles daily or as directed.       clotrimazole-betamethasone cream    LOTRISONE    30 g    Apply topically to the head of the penis two times a day for up to 2 weeks.       cyclophosphamide 50 MG capsule CHEMO    CYTOXAN    24 capsule    Take 6 capsules (300 mg) by mouth once a week       dexamethasone 4 MG tablet    DECADRON    40 tablet    Take 10 tablets (40 mg) by mouth every 7 days Take daily on Days 1, 8, 15, and 22.       ferrous sulfate 325 (65 FE) MG tablet    IRON     Take 325 mg by mouth daily (with breakfast)       FREESTYLE LITE test strip   Generic drug:  blood glucose monitoring     300 strip    USE TO TEST FOUR TIMES DAILY       furosemide 20 MG tablet    LASIX    30 tablet    Take 1 tablet (20 mg) by mouth daily       gemfibrozil 600 MG tablet    LOPID    180 tablet    TAKE 1 TABLET BY MOUTH TWICE DAILY       glipiZIDE 10 MG tablet    GLUCOTROL    180 tablet    TAKE 1 TABLET BY MOUTH TWICE DAILY BEFORE A MEAL       insulin aspart 100 UNIT/ML injection    NovoLOG PEN    12 mL    Inject 1-6 Units Subcutaneous 4 times daily (with meals and nightly) Before meal correction: 140-225 - take 1 unit  226-310 - take 2 units 311-395 - take 3 units 396-480 - take 4 units >481 - take 5 units Bedtime correction of: 200-299 give 1 unit 300-399 take 2 units 400 or greater take 3 units       insulin detemir 100 UNIT/ML injection    LEVEMIR FLEXPEN/FLEXTOUCH    15 mL    Inject 12 Units Subcutaneous every morning (before breakfast)       levothyroxine 25 MCG tablet    SYNTHROID/LEVOTHROID    90 tablet    TAKE 1 TABLET BY MOUTH EVERY DAY.       lisinopril 30 MG tablet    PRINIVIL,ZESTRIL    90 tablet    TAKE 1 TABLET BY MOUTH EVERY DAY       metoprolol 25 MG tablet    LOPRESSOR     180 tablet    TAKE 1 TABLET BY MOUTH TWICE DAILY       omeprazole 20 MG CR capsule    priLOSEC    90 capsule    TAKE 1 CAPSULE BY MOUTH EVERY DAY       ondansetron 8 MG tablet    ZOFRAN    10 tablet    Take 1 tablet (8 mg) by mouth every 8 hours as needed (Nausea/Vomiting)       polyethylene glycol powder    MIRALAX/GLYCOLAX         prochlorperazine 5 MG tablet    COMPAZINE    30 tablet    Take 1 tablet (5 mg) by mouth every 6 hours as needed (Nausea/Vomiting)       Selenium 200 MCG Tabs      Take 100 mcg by mouth daily. Pt takes one half tab of the 200 mcg daily       tamsulosin 0.4 MG capsule    FLOMAX    90 capsule    TAKE 1 CAPSULE BY MOUTH DAILY       VITAMIN C PO      Take 1,000 mg by mouth daily       VITAMIN D3 PO      Take 1,000 Units by mouth daily       * Notice:  This list has 2 medication(s) that are the same as other medications prescribed for you. Read the directions carefully, and ask your doctor or other care provider to review them with you.

## 2017-06-02 NOTE — MR AVS SNAPSHOT
After Visit Summary   6/2/2017    Roc Parkinson    MRN: [id number]           Patient Information     Date Of Birth          7/7/1926        Visit Information        Provider Department      6/2/2017 12:30 PM  DIABETIC ED RESOURCE [facility]    My Diabetes Care Goals:    Take sliding scale Novolog + 1 unit before each meal.    Follow up:  Follow-up diabetes education appointment scheduled on Friday, June 30th at 10:30 am.      Bring blood glucose meter and logbook with you to all doctor and follow-up appointments.     [facility] for the [facility] Area:  For Your Diabetes Education and Nutrition Appointments Call:  [phone]   For Diabetes Education or Nutrition Related Questions:   Phone: [phone]  E-mail: [email or web address]  Fax: [phone]   If you need a medication refill please contact your pharmacy. Please allow 3 business days for your refills to be completed.    Instructions for emailing the Diabetes Educators    If you need to communicate a non-urgent message to a Diabetes Educator via email, please send to [email or web address].    Please follow the following email guidelines:    Subject line: Secure: your clinic name (example: Secure: [facility])  In the email please include: First name, middle initial, last name and date of birth.    We will be in touch with you within one (1) business day.             Follow-ups after your visit        Your next 10 appointments already scheduled     Jun 06, 2017  7:30 AM CDT   Treatment with Jose Young, PT   [facility] ([facility])    [address]  [address]  [place] [address]   [phone]            Jun 07, 2017  9:00 AM CDT   Level 5 with  INFUSION CHAIR 14   [facility] ([facility])    [facility] [place]  [address]  [place]  [address]   [phone]            Jun 07, 2017 10:15 AM CDT   Return Visit with Gretel Quinn MD   [facility] ([facility])    [facility] [place] [address]  [place] [address]   [phone]            Jun 13, 2017  7:30 AM CDT   Treatment with Rosalva Martinez, PT   [facility] ([facility])    [address]  [address]  [place] [address]   [phone]            Jun 14, 2017  9:00 AM CDT   Level 5 with SH INFUSION CHAIR 17   [facility] ([facility])    [facility] [place] [address]  [place] [address]   [phone]            Jun 20, 2017  8:15 AM CDT   Treatment with Jose Young, PT   [facility] ([facility])    [address]  [address]  [place] [address]   [phone]            Jun 21, 2017  9:00 AM CDT   Level 5 with SH INFUSION CHAIR 19   [facility] ([facility])    [facility] [place] [address]  [place] [address]   [phone]            Jun 27, 2017  8:15 AM CDT   Treatment with Jose Young, PT   [facility] ([facility])    [address]  [address]  [place] [address]   [phone]            Jun 28, 2017  9:00 AM CDT   Level 5 with SH INFUSION CHAIR 14   [facility] ([facility])    [facility] [place] [address]  [place] [address]   [phone]            Jun 28, 2017 10:15 AM CDT   Return Visit with Gretel Quinn MD   [facility] ([facility])    [facility] [place] [address]  [place] [address]   [phone]              Who to contact     If you have questions or need follow up information about today's clinic visit or your  "schedule please contact [facility] directly at [phone].  Normal or non-critical lab and imaging results will be communicated to you by MyChart, letter or phone within 4 business days after the clinic has received the results. If you do not hear from us within 7 days, please contact the clinic through MyChart or phone. If you have a critical or abnormal lab result, we will notify you by phone as soon as possible.  Submit refill requests through [employer] or call your pharmacy and they will forward the refill request to us. Please allow 3 business days for your refill to be completed.          Additional Information About Your Visit        [employer] Information     [employer] lets you send messages to your doctor, view your test results, renew your prescriptions, schedule appointments and more. To sign up, go to [email or web address] . Click on \"Log in\" on the left side of the screen, which will take you to the Welcome page. Then click on \"Sign up Now\" on the right side of the page.     You will be asked to enter the access code listed below, as well as some personal information. Please follow the directions to create your username and password.     Your access code is: [email or web address]  Expires: 2017 12:48 PM     Your access code will  in 90 days. If you need help or a new code, please call your [place] clinic or [phone].        Care EveryWhere ID     This is your Care EveryWhere ID. This could be used by other organizations to access your [place] medical records  [id number]         Blood Pressure from Last 3 Encounters:   17 122/62   17 151/85   17 130/72    Weight from Last 3 Encounters:   17 88.8 kg (195 lb 12.8 oz)   17 90 kg (198 lb 6.4 oz)   17 88.9 kg (196 lb)              Today, you had the following     No orders found for display       Primary Care Provider Office Phone # Fax #    Dickson Reich [phone] "       [facility] [address]  [facility] [address]        Thank you!     Thank you for choosing [facility]  for your care. Our goal is always to provide you with excellent care. Hearing back from our patients is one way we can continue to improve our services. Please take a few minutes to complete the written survey that you may receive in the mail after your visit with us. Thank you!             Your Updated Medication List - Protect others around you: Learn how to safely use, store and throw away your medicines at www.disposemymeds.org.          This list is accurate as of: 6/2/17 12:58 PM.  Always use your most recent med list.                   Brand Name Dispense Instructions for use    acyclovir 400 MG tablet    ZOVIRAX    60 tablet    Take 1 tablet (400 mg) by mouth 2 times daily Viral Prophylaxis.       amLODIPine 5 MG tablet    NORVASC    30 tablet    TAKE 1 TABLET BY MOUTH DAILY       * B-D U/F 31G X 8 MM   Generic drug:  insulin pen needle          * insulin pen needle 31G X 8 MM    B-D U/F    100 each    Use 5 pen needles daily or as directed.       clotrimazole-betamethasone cream    LOTRISONE    30 g    Apply topically to the head of the penis two times a day for up to 2 weeks.       cyclophosphamide 50 MG capsule CHEMO    CYTOXAN    24 capsule    Take 6 capsules (300 mg) by mouth once a week       dexamethasone 4 MG tablet    DECADRON    40 tablet    Take 10 tablets (40 mg) by mouth every 7 days Take daily on Days 1, 8, 15, and 22.       ferrous sulfate 325 (65 FE) MG tablet    IRON     Take 325 mg by mouth daily (with breakfast)       FREESTYLE LITE test strip   Generic drug:  blood glucose monitoring     300 strip    USE TO TEST FOUR TIMES DAILY       furosemide 20 MG tablet    LASIX    30 tablet    Take 1 tablet (20 mg) by mouth daily       gemfibrozil 600 MG tablet    LOPID    180 tablet    TAKE 1 TABLET BY MOUTH TWICE DAILY       glipiZIDE 10 MG tablet     GLUCOTROL    180 tablet    TAKE 1 TABLET BY MOUTH TWICE DAILY BEFORE A MEAL       insulin aspart 100 UNIT/ML injection    NovoLOG PEN    12 mL    Inject 1-6 Units Subcutaneous 4 times daily (with meals and nightly) Before meal correction: 140-225 - take 1 unit  226-310 - take 2 units 311-395 - take 3 units 396-480 - take 4 units >481 - take 5 units Bedtime correction of: 200-299 give 1 unit 300-399 take 2 units 400 or greater take 3 units       insulin detemir 100 UNIT/ML injection    LEVEMIR FLEXPEN/FLEXTOUCH    15 mL    Inject 12 Units Subcutaneous every morning (before breakfast)       levothyroxine 25 MCG tablet    SYNTHROID/LEVOTHROID    90 tablet    TAKE 1 TABLET BY MOUTH EVERY DAY.       lisinopril 30 MG tablet    PRINIVIL,ZESTRIL    90 tablet    TAKE 1 TABLET BY MOUTH EVERY DAY       metoprolol 25 MG tablet    LOPRESSOR    180 tablet    TAKE 1 TABLET BY MOUTH TWICE DAILY       omeprazole 20 MG CR capsule    priLOSEC    90 capsule    TAKE 1 CAPSULE BY MOUTH EVERY DAY       ondansetron 8 MG tablet    ZOFRAN    10 tablet    Take 1 tablet (8 mg) by mouth every 8 hours as needed (Nausea/Vomiting)       polyethylene glycol powder    MIRALAX/GLYCOLAX         prochlorperazine 5 MG tablet    COMPAZINE    30 tablet    Take 1 tablet (5 mg) by mouth every 6 hours as needed (Nausea/Vomiting)       Selenium 200 MCG Tabs      Take 100 mcg by mouth daily. Pt takes one half tab of the 200 mcg daily       tamsulosin 0.4 MG capsule    FLOMAX    90 capsule    TAKE 1 CAPSULE BY MOUTH DAILY       VITAMIN C PO      Take 1,000 mg by mouth daily       VITAMIN D3 PO      Take 1,000 Units by mouth daily       * Notice:  This list has 2 medication(s) that are the same as other medications prescribed for you. Read the directions carefully, and ask your doctor or other care provider to review them with you.

## 2017-06-02 NOTE — NURSING NOTE
"Diabetes Self Management Training: Follow-up Visit    Roc Parkinson presents today for evaluation of glucose control and modification of medication(s) related to Type 2 diabetes.    He is accompanied by self    Patient's diabetes management related comments/concerns:  Reports that his blood sugars are better since starting the Novolog - \"used to be 300 - 400 mg/dl all the time\", still higher after he has chemo on Wednesdays    Patient's emotional response to diabetes: expresses readiness to learn and concern for health and well-being    Patient would like this visit to be focused around the following diabetes-related behaviors and goals: Taking Medication    ASSESSMENT:  Patient Problem List and Family Medical History reviewed for relevant medical history, current medical status, and diabetes risk factors.    Current Diabetes Management per Patient:      Diabetes Medication(s)     Insulin Sig    insulin aspart (NOVOLOG PEN) 100 UNIT/ML injection Inject 1-6 Units Subcutaneous 4 times daily (with meals and nightly) Before meal correction:   140-225 - take 1 unit    226-310 - take 2 units   311-395 - take 3 units   396-480 - take 4 units   >481 - take 5 units  Bedtime correction of:   200-299 give 1 unit   300-399 take 2 units   400 or greater take 3 units    insulin detemir (LEVEMIR FLEXPEN/FLEXTOUCH) 100 UNIT/ML injection Inject 12 Units Subcutaneous every morning (before breakfast)    Sulfonylureas Sig    glipiZIDE (GLUCOTROL) 10 MG tablet TAKE 1 TABLET BY MOUTH TWICE DAILY BEFORE A MEAL        Patient did not start base dose of 1 unit Novolog before each meal. Reports that he previously did not understand the instructions. Has been using the sliding scale as directed.    Past Diabetes Education: Yes    Patient glucose self monitoring as follows: four times daily, before meals.     BG results:        BG values are: improved  Patient's most recent  Lab Results   Component Value Date    A1C 8.0 05/23/2017    is " "not meeting goal of <8.0    Nutrition:  Patient eats 3 meals per day. Has been trying to lose weight.     Physical Activity:  Did not discuss    Diabetes Risk Factors:  Previous dx    Diabetes Complications:  Acute Complications: At risk for hypoglycemia? yes  Frequency of hypoglycemia: occasionally has a fasting BG < 70 mg/dl    Vitals:  Wt Readings from Last 3 Encounters:   05/31/17 88.8 kg (195 lb 12.8 oz)   05/24/17 90 kg (198 lb 6.4 oz)   05/22/17 88.9 kg (196 lb)     Estimated body mass index is 28.09 kg/(m^2) as calculated from the following:    Height as of 5/31/17: 1.778 m (5' 10\").    Weight as of 5/31/17: 88.8 kg (195 lb 12.8 oz).   Last 3 BP:   BP Readings from Last 3 Encounters:   05/31/17 122/62   05/24/17 151/85   05/22/17 130/72       History   Smoking Status     Never Smoker   Smokeless Tobacco     Never Used       Labs:  Lab Results   Component Value Date    A1C 8.0 05/23/2017     Lab Results   Component Value Date     05/31/2017     Lab Results   Component Value Date    LDL 64 05/23/2017     HDL Cholesterol   Date Value Ref Range Status   05/23/2017 35 (L) >39 mg/dL Final   ]  GFR Estimate   Date Value Ref Range Status   05/31/2017 64 >60 mL/min/1.7m2 Final     Comment:     Non  GFR Calc     GFR Estimate If Black   Date Value Ref Range Status   05/31/2017 78 >60 mL/min/1.7m2 Final     Comment:      GFR Calc     Lab Results   Component Value Date    CR 1.08 05/31/2017     No results found for: MICROALBUMIN    Socio/Economic Considerations:    Support system: not assessed    Health Beliefs and Attitudes:   Patient Activation Measure Survey Score:  FANNY Score (Last Two) 10/14/2015 9/28/2016   FANNY Raw Score 40 29   Activation Score 60 52.9   FANNY Level 3 2       Stage of Change: ACTION (Actively working towards change)    Diabetes knowledge and skills assessment:     Patient is knowledgeable in diabetes management concepts related to: Monitoring and Problem " Solving    Patient needs further education on the following diabetes management concepts: Taking Medication    Barriers to Learning Assessment: No Barriers identified    Based on learning assessment above, most appropriate setting for further diabetes education would be: Individual setting.    INTERVENTION:   Education provided today on:  AADE Self-Care Behaviors:  Taking Medication: action of prescribed medication,  discussed concept of bolus insulin to cover food + sliding scale for correcting elevated BG's, reviewed use of base dose of meal time insulin along with proper dosing adjustments.     Opportunities for ongoing education and support in diabetes-self management were discussed.    Pt verbalized understanding of concepts discussed and recommendations provided today.       Education Materials Provided:  No new materials provided today    PLAN:  See Patient Instructions for co-developed, patient-stated behavior change goals.  AVS printed and provided to patient today.    FOLLOW-UP:  Follow-up appointment scheduled 1 month.  Chart routed to referring provider.    Ongoing plan for education and support: Follow-up visit with diabetes educator  and Follow-up with primary care provider    Juana Stout RD, MARIAME  Diabetes     Time Spent: 30 minutes  Encounter Type: Individual    Any diabetes medication dose changes were made via the CDE Protocol and Collaborative Practice Agreement with the patient's primary care provider. A copy of this encounter was shared with the provider.

## 2017-06-02 NOTE — PATIENT INSTRUCTIONS
My Diabetes Care Goals:    Take bas dose of 1 unit Novolog + sliding scale before each meal.     Follow up:  Follow-up diabetes education appointment scheduled on Friday, June 30th at 10:30 am.      Bring blood glucose meter and logbook with you to all doctor and follow-up appointments.     [facility] the [facility]:  For Your Diabetes Education and Nutrition Appointments Call:  [phone]   For Diabetes Education or Nutrition Related Questions:   Phone: [phone]  E-mail: [email or web address]  Fax: [phone]   If you need a medication refill please contact your pharmacy. Please allow 3 business days for your refills to be completed.    Instructions for emailing the Diabetes Educators    If you need to communicate a non-urgent message to a Diabetes Educator via email, please send to [email or web address].    Please follow the following email guidelines:    Subject line: Secure: your clinic name (example: Secure: [facility])  In the email please include: First name, middle initial, last name and date of birth.    We will be in touch with you within one (1) business day.

## 2017-06-07 NOTE — PROGRESS NOTES
"[facility] PHYSICIANS  HEMATOLOGY ONCOLOGY     DIAGNOSIS:    1- Kappa light chain IgM multiple myeloma in a 89-year-old patient.  Bone marrow biopsy on 11/17/2016 showed hypercellular bone marrow with 65% cellularity of light chain restricted plasma cells.  FISH or G-banding could not be performed due to insufficient sample.   There is a background of myelodysplastic syndrome.  The patient was initially seen in the hospital on 11/17/2016 for macrocytic anemia and pancytopenia and transfusion requirement and a bone marrow biopsy was performed.   2- History of prostate cancer s/p EBRT s/p brachytherapy in 2003 (recent PSA 0.10), superficial bladder cancer ,no recurrences since 1986     TREATMENT: 12/15/16 velcade/dex. 4/12/17 added cyclophosphamide 300 mg weekly.      SUBJECTIVE:  The patient was seen as a followup today. He has been feeling well. No issues with neuropathy.     REVIEW OF SYSTEMS:  A complete review of systems was performed and found to be negative other than pertinent positives mentioned in history of present illness.     Past medical, social histories reviewed.    Meds- Reviewed.     PHYSICAL EXAMINATION:   VITAL SIGNS:/70  Pulse 65  Temp 97.9  F (36.6  C) (Oral)  Resp 16  Ht 1.778 m (5' 10\")  Wt 88.9 kg (196 lb)  BMI 28.12 kg/m2  GENERAL: Sitting comfortably.   HEENT: Pupils are equal. Oropharynx is clear.   NECK: No cervical or supraclavicular lymphadenopathy.   LUNGS: Clear bilaterally.   HEART: S1, S2, regular.   ABDOMEN: Soft, nontender, nondistended, no hepatosplenomegaly.   EXTREMITIES: Warm, well perfused.   NEUROLOGIC: Alert, awake.   SKIN: No rash.   LYMPHATICS: Trace ankle edema.      LABORATORY DATA:   Recent Labs   Lab Test  06/07/17   0915  05/31/17   1010   NA  137  137   POTASSIUM  5.2  5.0   CHLORIDE  107  107   CO2  23  22   ANIONGAP  7  8   BUN  31*  33*   CR  0.96  1.08   GLC  241*  259*   MICHELLE  9.2  9.5     Recent Labs   Lab Test  06/07/17   0915  " 05/31/17   1010  05/24/17   0950   WBC  2.6*  2.9*  2.9*   HGB  10.1*  9.8*  7.9*   PLT  75*  78*  62*   MCV  104*  104*  101*   NEUTROPHIL  82.6  68.9  83.6     Recent Labs   Lab Test  06/07/17   0915  05/31/17   1010  05/17/17   0950   11/17/16   0938   BILITOTAL  0.5  0.4  0.5   < >   --    ALKPHOS  82  80  68   < >   --    ALT  Pending  18  16   < >   --    AST  14  12  11   < >   --    ALBUMIN  3.1*  3.0*  2.9*   < >   --    LDH   --    --    --    --   110    < > = values in this interval not displayed.     ECOG PS: 1    ASSESSMENT:  This is a 89-year-old gentleman who has kappa light chain multiple myeloma with hypercellular marrow with 65% of cells are light chain restricted plasma cells.  He has severe pancytopenia and has needed a transfusion in the hospital.  He did not have hypercalcemia and his renal function was normal. He has a background of myelodysplastic syndrome on his bone marrow biopsy; however, majority of the cell population was monoclonal plasma cell population.   He was started on treatment due to cytopenia.   He is on weekly Velcade with dexamethasone 20 mg every week with every week labs and transfusion support at this time.   In 4/2017 his light chain levels were getting worse. M spike was stable. We added cyclophosphamide to the regimen.  - He is on Aranesp 300 mcg SQ every three weeks for transfusion dependence.   - He appears to have a good response to treatment overall.   - He will continue current chemotherapy.      PLAN:   1.  Continue cyclophosphamide, Velcade and dexamethasone. Treatment today  2.  CBC, diff weekly- transfuse to keep hemoglobin of 8 and platelet of 10,000.   3.  Continue Zometa  4. Continue Aranesp 300 mcg SQ every three weeks  5- RTC MD 3 weeks  6- Labs today- SPEP, IFXN, light chains    ALISON JON MD    6/7/2017

## 2017-06-07 NOTE — PROGRESS NOTES
Infusion Nursing Note:  Roc Parkinson presents today for C7D1 Velcade.    Patient seen by provider today: Yes: Dr. Quinn   present during visit today: Not Applicable.    Note: N/A.    Intravenous Access:  Labs drawn without difficulty.  Peripheral IV placed.    Treatment Conditions:  Lab Results   Component Value Date    HGB 10.1 06/07/2017     Lab Results   Component Value Date    WBC 2.6 06/07/2017      Lab Results   Component Value Date    ANEU 2.2 06/07/2017     Lab Results   Component Value Date    PLT 75 06/07/2017      Lab Results   Component Value Date     06/07/2017                   Lab Results   Component Value Date    POTASSIUM 5.2 06/07/2017           No results found for: MAG         Lab Results   Component Value Date    CR 0.96 06/07/2017                   Lab Results   Component Value Date    MICHELLE 9.2 06/07/2017                Lab Results   Component Value Date    BILITOTAL 0.5 06/07/2017           Lab Results   Component Value Date    ALBUMIN 3.1 06/07/2017                    Lab Results   Component Value Date    ALT 16 06/07/2017           Lab Results   Component Value Date    AST 14 06/07/2017     Results reviewed, labs MET treatment parameters, ok to proceed with treatment.  No need for any tx's today.      Post Infusion Assessment:  Patient tolerated injection without incident.  Site patent and intact, free from redness, edema or discomfort.  No evidence of extravasations.  Access discontinued per protocol.    Discharge Plan:   Prescription refills given for Cytoxan, decadron.  Discharge instructions reviewed with: Patient.  Patient and/or family verbalized understanding of discharge instructions and all questions answered.  Copy of AVS reviewed with patient and/or family.    Patient discharged in stable condition accompanied by: self.  Departure Mode: Ambulatory.    Alberto Nash RN

## 2017-06-07 NOTE — MR AVS SNAPSHOT
After Visit Summary   6/7/2017    Roc Parkinson    MRN: [id number]           Patient Information     Date Of Birth          7/7/1926        Visit Information        Provider Department      6/7/2017 9:00 AM SH INFUSION CHAIR 14 [facility]        Today's Diagnoses     Multiple myeloma not having achieved remission (H)    -  1       Follow-ups after your visit        Your next 10 appointments already scheduled     Jun 13, 2017  7:30 AM CDT   Treatment with Rosalva Martinez, PT   [facility] ([facility])    [address]  [address]  [facility] [address]   [phone]            Jun 14, 2017  9:00 AM CDT   Level 5 with SH INFUSION CHAIR 17   [facility] ([facility])    [facility]  [address]  [place] [address]   [phone]            Jun 20, 2017  8:15 AM CDT   Treatment with Jose Young, PT   [facility] ([facility])    [address]  [address]  [facility] [address]   [phone]            Jun 21, 2017  9:00 AM CDT   Level 5 with SH INFUSION CHAIR 19   [facility] ([facility])    [facility]  [address]  [place] [address]   [phone]            Jun 27, 2017  8:15 AM CDT   Treatment with Jose Young, PT   [facility] ([facility])    [address]  [address]  [facility] [address]   [phone]            Jun 28, 2017  9:00 AM CDT   Level 5 with SH INFUSION CHAIR 14   [facility] ([facility])    [facility]  [address]  [place] [address]   [phone]            Jun 28, 2017 10:15 AM CDT   Return Visit with Gretel Quinn MD   [facility])    [facility]  [address]  [facility] [address]   [phone]            Jun 30, 2017 10:30 AM CDT   Diabetic Education with [facility]   [facility]    [address] [address]   [phone]            Jul 06, 2017  8:15 AM CDT   Treatment with Jose Young, PT   [facility] ([facility])    [address]  [address]  [facility] [address]   [phone]            Jul 11, 2017  8:15 AM CDT   Treatment with Rosalva Martinez, PT   [facility] ([facility])    [address]  [address]  [facility] [address] [phone]              Who to contact     If you have questions or need follow up information about today's clinic visit or your schedule please contact [facility] directly at [phone].  Normal or non-critical lab and imaging results will be communicated to you by Avtodoriahart, letter or phone within 4 business days after the clinic has received the results. If you do not hear from us within 7 days, please contact the clinic through Avtodoriahart or phone. If you have a critical or abnormal lab result, we will notify you by phone as soon as possible.  Submit refill requests through MeilleurMobile or call your pharmacy and they will forward the refill request to us. Please allow 3 business days for your refill to be completed.          Additional Information About Your Visit        AvtodoriaharCircassia Information     Me[employer]Mobile lets you send messages to your doctor, view your test results, renew your prescriptions, schedule appointments and more. To sign up, go to [email or web address] . Click on \"Log in\" on the left side of the screen, which will take you to the Welcome page. Then click on \"Sign up Now\" on the right side of the page.     You will be asked to enter the access code listed below, as " "well as some personal information. Please follow the directions to create your username and password.     Your access code is: [email or web address]  Expires: 2017 12:48 PM     Your access code will  in 90 days. If you need help or a new code, please call your [facility] clinic or [phone].        Care EveryWhere ID     This is your Care EveryWhere ID. This could be used by other organizations to access your [facility] medical records  [id number]        Your Vitals Were     Pulse Temperature Respirations Height BMI (Body Mass Index)       65 97.9  F (36.6  C) (Oral) 16 1.778 m (5' 10\") 28.12 kg/m2        Blood Pressure from Last 3 Encounters:   17 142/70   17 142/70   17 122/62    Weight from Last 3 Encounters:   17 88.9 kg (196 lb)   17 88.9 kg (196 lb)   17 88.8 kg (195 lb 12.8 oz)              We Performed the Following     CBC with platelets differential     Comprehensive metabolic panel     Treatment Conditions 2          Today's Medication Changes          These changes are accurate as of: 17 11:20 AM.  If you have any questions, ask your nurse or doctor.               These medicines have changed or have updated prescriptions.        Dose/Directions    * cyclophosphamide 50 MG capsule CHEMO   Commonly known as:  CYTOXAN   This may have changed:  Another medication with the same name was added. Make sure you understand how and when to take each.   Used for:  Multiple myeloma not having achieved remission (H)   Changed by:  Celia Damico RPH        Dose:  300 mg   Take 6 capsules (300 mg) by mouth once a week   Quantity:  24 capsule   Refills:  0       * cyclophosphamide 50 MG capsule CHEMO   Commonly known as:  CYTOXAN   This may have changed:  You were already taking a medication with the same name, and this prescription was added. Make sure you understand how and when to take each.   Used for:  Multiple myeloma not having achieved remission (H)        Dose:  300 " mg   Take 6 capsules (300 mg) by mouth once a week   Quantity:  24 capsule   Refills:  0       * dexamethasone 4 MG tablet   Commonly known as:  DECADRON   This may have changed:  Another medication with the same name was added. Make sure you understand how and when to take each.   Used for:  Multiple myeloma not having achieved remission (H)        Dose:  40 mg   Take 10 tablets (40 mg) by mouth every 7 days Take daily on Days 1, 8, 15, and 22.   Quantity:  40 tablet   Refills:  0       * dexamethasone 4 MG tablet   Commonly known as:  DECADRON   This may have changed:  You were already taking a medication with the same name, and this prescription was added. Make sure you understand how and when to take each.   Used for:  Multiple myeloma not having achieved remission (H)        Dose:  40 mg   Take 10 tablets (40 mg) by mouth every 7 days for 4 doses Take daily on Days 1, 8, 15, and 22.   Quantity:  40 tablet   Refills:  0       * Notice:  This list has 4 medication(s) that are the same as other medications prescribed for you. Read the directions carefully, and ask your doctor or other care provider to review them with you.         Where to get your medicines      These medications were sent to [facility] [place] - [address]  [address] [place] [address], [facility] [address]     Phone:  [phone]     cyclophosphamide 50 MG capsule CHEMO    dexamethasone 4 MG tablet                Primary Care Provider Office Phone # Fax #    Dickson Reich [phone]       [facility] [address]  [facility] [address]        Thank you!     Thank you for choosing [facility]  for your care. Our goal is always to provide you with excellent care. Hearing back from our patients is one way we can continue to improve our services. Please take a few minutes to complete the written survey that you may receive in the mail after your visit  with us. Thank you!             Your Updated Medication List - Protect others around you: Learn how to safely use, store and throw away your medicines at www.disposemymeds.org.          This list is accurate as of: 6/7/17 11:20 AM.  Always use your most recent med list.                   Brand Name Dispense Instructions for use    acyclovir 400 MG tablet    ZOVIRAX    60 tablet    Take 1 tablet (400 mg) by mouth 2 times daily Viral Prophylaxis.       amLODIPine 5 MG tablet    NORVASC    30 tablet    TAKE 1 TABLET BY MOUTH DAILY       * B-D U/F 31G X 8 MM   Generic drug:  insulin pen needle          * insulin pen needle 31G X 8 MM    B-D U/F    100 each    Use 5 pen needles daily or as directed.       clotrimazole-betamethasone cream    LOTRISONE    30 g    Apply topically to the head of the penis two times a day for up to 2 weeks.       * cyclophosphamide 50 MG capsule CHEMO    CYTOXAN    24 capsule    Take 6 capsules (300 mg) by mouth once a week       * cyclophosphamide 50 MG capsule CHEMO    CYTOXAN    24 capsule    Take 6 capsules (300 mg) by mouth once a week       * dexamethasone 4 MG tablet    DECADRON    40 tablet    Take 10 tablets (40 mg) by mouth every 7 days Take daily on Days 1, 8, 15, and 22.       * dexamethasone 4 MG tablet    DECADRON    40 tablet    Take 10 tablets (40 mg) by mouth every 7 days for 4 doses Take daily on Days 1, 8, 15, and 22.       ferrous sulfate 325 (65 FE) MG tablet    IRON     Take 325 mg by mouth daily (with breakfast)       FREESTYLE LITE test strip   Generic drug:  blood glucose monitoring     300 strip    USE TO TEST FOUR TIMES DAILY       furosemide 20 MG tablet    LASIX    30 tablet    Take 1 tablet (20 mg) by mouth daily       gemfibrozil 600 MG tablet    LOPID    180 tablet    TAKE 1 TABLET BY MOUTH TWICE DAILY       glipiZIDE 10 MG tablet    GLUCOTROL    180 tablet    TAKE 1 TABLET BY MOUTH TWICE DAILY BEFORE A MEAL       insulin aspart 100 UNIT/ML injection    NovoLOG  PEN    12 mL    Inject 1-6 Units Subcutaneous 4 times daily (with meals and nightly) Before meal correction: 140-225 - take 1 unit  226-310 - take 2 units 311-395 - take 3 units 396-480 - take 4 units >481 - take 5 units Bedtime correction of: 200-299 give 1 unit 300-399 take 2 units 400 or greater take 3 units       insulin detemir 100 UNIT/ML injection    LEVEMIR FLEXPEN/FLEXTOUCH    15 mL    Inject 12 Units Subcutaneous every morning (before breakfast)       levothyroxine 25 MCG tablet    SYNTHROID/LEVOTHROID    90 tablet    TAKE 1 TABLET BY MOUTH EVERY DAY.       lisinopril 30 MG tablet    PRINIVIL,ZESTRIL    90 tablet    TAKE 1 TABLET BY MOUTH EVERY DAY       metoprolol 25 MG tablet    LOPRESSOR    180 tablet    TAKE 1 TABLET BY MOUTH TWICE DAILY       omeprazole 20 MG CR capsule    priLOSEC    90 capsule    TAKE 1 CAPSULE BY MOUTH EVERY DAY       ondansetron 8 MG tablet    ZOFRAN    10 tablet    Take 1 tablet (8 mg) by mouth every 8 hours as needed (Nausea/Vomiting)       polyethylene glycol powder    MIRALAX/GLYCOLAX         prochlorperazine 5 MG tablet    COMPAZINE    30 tablet    Take 1 tablet (5 mg) by mouth every 6 hours as needed (Nausea/Vomiting)       Selenium 200 MCG Tabs      Take 100 mcg by mouth daily. Pt takes one half tab of the 200 mcg daily       tamsulosin 0.4 MG capsule    FLOMAX    90 capsule    TAKE 1 CAPSULE BY MOUTH DAILY       VITAMIN C PO      Take 1,000 mg by mouth daily       VITAMIN D3 PO      Take 1,000 Units by mouth daily       * Notice:  This list has 6 medication(s) that are the same as other medications prescribed for you. Read the directions carefully, and ask your doctor or other care provider to review them with you.

## 2017-06-07 NOTE — PATIENT INSTRUCTIONS
1.  Continue cyclophosphamide, Velcade and dexamethasone. Treatment today  2.  CBC, diff weekly- transfuse to keep hemoglobin of 8 and platelet of 10,000.   3.  Continue Zometa  4. Continue Aranesp 300 mcg SQ every three weeks  5- RTC MD 3 weeks  6- Labs today- SPEP, IFXN, light chains    Pt was given a copy of his future appts.

## 2017-06-07 NOTE — MR AVS SNAPSHOT
After Visit Summary   6/7/2017    Roc Parkinson    MRN: [id number]           Patient Information     Date Of Birth          7/7/1926        Visit Information        Provider Department      6/7/2017 10:15 AM Gretel Quinn MD [facility]        Today's Diagnoses     Multiple myeloma not having achieved remission (H)    -  1      Care Instructions    1.  Continue cyclophosphamide, Velcade and dexamethasone. Treatment today  2.  CBC, diff weekly- transfuse to keep hemoglobin of 8 and platelet of 10,000.   3.  Continue Zometa  4. Continue Aranesp 300 mcg SQ every three weeks  5- RTC MD 3 weeks  6- Labs today- SPEP, IFXN, light chains    Pt was given a copy of his future appts.          Follow-ups after your visit        Your next 10 appointments already scheduled     Jun 13, 2017  7:30 AM CDT   Treatment with Rosalva Martinez, PT   [facility] ([facility])    [address] [place] [address] [address]  [facility] [address]   [phone]            Jun 14, 2017  9:00 AM CDT   Level 5 with SH INFUSION CHAIR 17   [facility] Infusion [facility] ([facility])    [facility] [place] [id number]  [facility] [address]   [phone]            Jun 20, 2017  8:15 AM CDT   Treatment with Jose Hannah, PT   [facility] ([facility])    [address] [place] [address]  [address]  [facility] [address]   [phone]            Jun 21, 2017  9:00 AM CDT   Level 5 with SH INFUSION CHAIR 19   [facility] ([facility])    [facility]  [address]  [facility] [address]   [phone]            Jun 27, 2017  8:15 AM CDT   Treatment with Jose Hannah, PT   [facility] ([facility])    [address] [place] [address]  [address]  [facility] [address]   [phone]            Jun 28, 2017  9:00 AM CDT   Level 5 with   INFUSION CHAIR 14   [facility] ([facility])    [facility] [place]  [address]  [place] [address]   [phone]            Jun 28, 2017 10:15 AM CDT   Return Visit with Gretel Quinn MD   [facility] ([facility])    [facility] [place]  [address]  [place] [address]   [phone]            Jun 30, 2017 10:30 AM CDT   Diabetic Education with  DIABETIC ED RESOURCE   [facility] ([facility])    [address] [address]   [phone]            Jul 06, 2017  8:15 AM CDT   Treatment with Jose Young, PT   [facility] ([facility])    [address]  [address]  [facility] [address] [phone]            Jul 11, 2017  8:15 AM CDT   Treatment with Rosalva Martinez, PT   [facility] ([facility])    [address]  [address]  [facility] [address] [phone]              Who to contact     If you have questions or need follow up information about today's clinic visit or your schedule please contact [facility] directly at [phone].  Normal or non-critical lab and imaging results will be communicated to you by Vibrynthart, letter or phone within 4 business days after the clinic has received the results. If you do not hear from us within 7 days, please contact the clinic through Vibrynthart or phone. If you have a critical or abnormal lab result, we will notify you by phone as soon as possible.  Submit refill requests through [employer] or call your pharmacy and they will forward the refill request to us. Please allow 3 business days for your refill to be completed.          Additional Information About Your Visit        [employer] Information     [employer] lets you send messages to your doctor, view your test results, renew  "your prescriptions, schedule appointments and more. To sign up, go to [email or web address] . Click on \"Log in\" on the left side of the screen, which will take you to the Welcome page. Then click on \"Sign up Now\" on the right side of the page.     You will be asked to enter the access code listed below, as well as some personal information. Please follow the directions to create your username and password.     Your access code is: [email or web address]  Expires: 2017 12:48 PM     Your access code will  in 90 days. If you need help or a new code, please call your [place] clinic or [phone].        Care EveryWhere ID     This is your Care EveryWhere ID. This could be used by other organizations to access your [place] medical records  [id number]        Your Vitals Were     Pulse Temperature Respirations Height BMI (Body Mass Index)       65 97.9  F (36.6  C) (Oral) 16 1.778 m (5' 10\") 28.12 kg/m2        Blood Pressure from Last 3 Encounters:   17 142/70   17 142/70   17 122/62    Weight from Last 3 Encounters:   17 88.9 kg (196 lb)   17 88.9 kg (196 lb)   17 88.8 kg (195 lb 12.8 oz)              We Performed the Following     Kappa and lambda light chain     Protein electrophoresis     Protein Immunofixation Serum          Today's Medication Changes          These changes are accurate as of: 17 10:24 AM.  If you have any questions, ask your nurse or doctor.               These medicines have changed or have updated prescriptions.        Dose/Directions    * cyclophosphamide 50 MG capsule CHEMO   Commonly known as:  CYTOXAN   This may have changed:  Another medication with the same name was added. Make sure you understand how and when to take each.   Used for:  Multiple myeloma not having achieved remission (H)   Changed by:  Celia Damico RPH        Dose:  300 mg   Take 6 capsules (300 mg) by mouth once a week   Quantity:  24 capsule   Refills:  0       * " cyclophosphamide 50 MG capsule CHEMO   Commonly known as:  CYTOXAN   This may have changed:  You were already taking a medication with the same name, and this prescription was added. Make sure you understand how and when to take each.   Used for:  Multiple myeloma not having achieved remission (H)        Dose:  300 mg   Take 6 capsules (300 mg) by mouth once a week   Quantity:  24 capsule   Refills:  0       * dexamethasone 4 MG tablet   Commonly known as:  DECADRON   This may have changed:  Another medication with the same name was added. Make sure you understand how and when to take each.   Used for:  Multiple myeloma not having achieved remission (H)        Dose:  40 mg   Take 10 tablets (40 mg) by mouth every 7 days Take daily on Days 1, 8, 15, and 22.   Quantity:  40 tablet   Refills:  0       * dexamethasone 4 MG tablet   Commonly known as:  DECADRON   This may have changed:  You were already taking a medication with the same name, and this prescription was added. Make sure you understand how and when to take each.   Used for:  Multiple myeloma not having achieved remission (H)        Dose:  40 mg   Take 10 tablets (40 mg) by mouth every 7 days for 4 doses Take daily on Days 1, 8, 15, and 22.   Quantity:  40 tablet   Refills:  0       * Notice:  This list has 4 medication(s) that are the same as other medications prescribed for you. Read the directions carefully, and ask your doctor or other care provider to review them with you.         Where to get your medicines      These medications were sent to [facility] [place], [place] - [address]  [address] [facility] [address], [place] [address]     Phone:  [phone]     cyclophosphamide 50 MG capsule CHEMO    dexamethasone 4 MG tablet                Primary Care Provider Office Phone # Fax #    Dickson Reich [phone]       [facility] [address]  [facility] [address]        Thank you!     Thank you  for choosing [facility]  for your care. Our goal is always to provide you with excellent care. Hearing back from our patients is one way we can continue to improve our services. Please take a few minutes to complete the written survey that you may receive in the mail after your visit with us. Thank you!             Your Updated Medication List - Protect others around you: Learn how to safely use, store and throw away your medicines at www.disposemymeds.org.          This list is accurate as of: 6/7/17 10:24 AM.  Always use your most recent med list.                   Brand Name Dispense Instructions for use    acyclovir 400 MG tablet    ZOVIRAX    60 tablet    Take 1 tablet (400 mg) by mouth 2 times daily Viral Prophylaxis.       amLODIPine 5 MG tablet    NORVASC    30 tablet    TAKE 1 TABLET BY MOUTH DAILY       * B-D U/F 31G X 8 MM   Generic drug:  insulin pen needle          * insulin pen needle 31G X 8 MM    B-D U/F    100 each    Use 5 pen needles daily or as directed.       clotrimazole-betamethasone cream    LOTRISONE    30 g    Apply topically to the head of the penis two times a day for up to 2 weeks.       * cyclophosphamide 50 MG capsule CHEMO    CYTOXAN    24 capsule    Take 6 capsules (300 mg) by mouth once a week       * cyclophosphamide 50 MG capsule CHEMO    CYTOXAN    24 capsule    Take 6 capsules (300 mg) by mouth once a week       * dexamethasone 4 MG tablet    DECADRON    40 tablet    Take 10 tablets (40 mg) by mouth every 7 days Take daily on Days 1, 8, 15, and 22.       * dexamethasone 4 MG tablet    DECADRON    40 tablet    Take 10 tablets (40 mg) by mouth every 7 days for 4 doses Take daily on Days 1, 8, 15, and 22.       ferrous sulfate 325 (65 FE) MG tablet    IRON     Take 325 mg by mouth daily (with breakfast)       FREESTYLE LITE test strip   Generic drug:  blood glucose monitoring     300 strip    USE TO TEST FOUR TIMES DAILY       furosemide 20 MG tablet    LASIX    30  tablet    Take 1 tablet (20 mg) by mouth daily       gemfibrozil 600 MG tablet    LOPID    180 tablet    TAKE 1 TABLET BY MOUTH TWICE DAILY       glipiZIDE 10 MG tablet    GLUCOTROL    180 tablet    TAKE 1 TABLET BY MOUTH TWICE DAILY BEFORE A MEAL       insulin aspart 100 UNIT/ML injection    NovoLOG PEN    12 mL    Inject 1-6 Units Subcutaneous 4 times daily (with meals and nightly) Before meal correction: 140-225 - take 1 unit  226-310 - take 2 units 311-395 - take 3 units 396-480 - take 4 units >481 - take 5 units Bedtime correction of: 200-299 give 1 unit 300-399 take 2 units 400 or greater take 3 units       insulin detemir 100 UNIT/ML injection    LEVEMIR FLEXPEN/FLEXTOUCH    15 mL    Inject 12 Units Subcutaneous every morning (before breakfast)       levothyroxine 25 MCG tablet    SYNTHROID/LEVOTHROID    90 tablet    TAKE 1 TABLET BY MOUTH EVERY DAY.       lisinopril 30 MG tablet    PRINIVIL,ZESTRIL    90 tablet    TAKE 1 TABLET BY MOUTH EVERY DAY       metoprolol 25 MG tablet    LOPRESSOR    180 tablet    TAKE 1 TABLET BY MOUTH TWICE DAILY       omeprazole 20 MG CR capsule    priLOSEC    90 capsule    TAKE 1 CAPSULE BY MOUTH EVERY DAY       ondansetron 8 MG tablet    ZOFRAN    10 tablet    Take 1 tablet (8 mg) by mouth every 8 hours as needed (Nausea/Vomiting)       polyethylene glycol powder    MIRALAX/GLYCOLAX         prochlorperazine 5 MG tablet    COMPAZINE    30 tablet    Take 1 tablet (5 mg) by mouth every 6 hours as needed (Nausea/Vomiting)       Selenium 200 MCG Tabs      Take 100 mcg by mouth daily. Pt takes one half tab of the 200 mcg daily       tamsulosin 0.4 MG capsule    FLOMAX    90 capsule    TAKE 1 CAPSULE BY MOUTH DAILY       VITAMIN C PO      Take 1,000 mg by mouth daily       VITAMIN D3 PO      Take 1,000 Units by mouth daily       * Notice:  This list has 6 medication(s) that are the same as other medications prescribed for you. Read the directions carefully, and ask your doctor or other  care provider to review them with you.

## 2017-06-07 NOTE — PROGRESS NOTES
"Oncology Rooming Note    June 7, 2017 10:03 AM   Roc Parkinson is a 89 year old male who presents for:    Chief Complaint   Patient presents with     Oncology Clinic Visit     return pt.      Initial Vitals: /70  Pulse 65  Temp 97.9  F (36.6  C) (Oral)  Resp 16  Ht 1.778 m (5' 10\")  Wt 88.9 kg (196 lb)  BMI 28.12 kg/m2 Estimated body mass index is 28.12 kg/(m^2) as calculated from the following:    Height as of this encounter: 1.778 m (5' 10\").    Weight as of this encounter: 88.9 kg (196 lb). Body surface area is 2.1 meters squared.  Data Unavailable Comment: Data Unavailable   No LMP for male patient.  Allergies reviewed: Yes  Medications reviewed: Yes    Medications: Medication refills not needed today.  Pharmacy name entered into [email or web address]: [facility] [address] [place] [address] [address] AT [facility] [address]    Clinical concerns: none Kai was notified.    8 minutes for nursing intake (face to face time)     Yolanda Marsh MA    DISCHARGE PLAN:  Next appointments: See patient instruction section--previously scheduled  Departure Mode: Ambulatory  Accompanied by: staff  5 minutes for nursing discharge (face to face time)   Bere Pace RN    1.  Continue cyclophosphamide, Velcade and dexamethasone. Treatment today  2.  CBC, diff weekly- transfuse to keep hemoglobin of 8 and platelet of 10,000.   3.  Continue Zometa  4. Continue Aranesp 300 mcg SQ every three weeks  5- RTC MD 3 weeks  6- Labs today- SPEP, IFXN, light chains    Pt was given a copy of his future appts.   S 6/14/2017 Wed  9:00 AM  9:00 A 300 [facility] [[id number]]  INFUSION CHAIR 17 [[id number]] LEVEL 5 [667] C7D8 Velcade, Zometa, possible Arenesp/poss transfusion      S 6/21/2017 Wed  9:00 AM  9:00 A 300 [facility] [[id number]] SH INFUSION CHAIR 19 [[id number]] LEVEL 5 [667] C7D15 Velcade, possible transfusion      S 6/28/2017 Wed  9:00 AM  9:00 A 300 [facility] [[id number]] SH INFUSION CHAIR 14 [[id number]] LEVEL 5 [667] C7D22 natalio Armenta " transfusion/Dr. Quinn      S 6/28/2017 Wed 10:15 AM 10:15 A 15 [facility] [[id number]] ALISON QUINN [[id number]] RETURN [487] Return - Multiple Myeloma

## 2017-06-14 NOTE — PROGRESS NOTES
Infusion Nursing Note:  Roc Parkinson presents today for zometa, possible transfusion/aranesp, velcade.    Patient seen by provider today: No   present during visit today: Not Applicable.    Note: N/A.    Intravenous Access:  Peripheral IV placed.    Treatment Conditions: Pt met conditions for aranesp, zometa and velcade  Lab Results   Component Value Date    HGB 9.5 06/14/2017     Lab Results   Component Value Date    WBC 2.8 06/14/2017      Lab Results   Component Value Date    ANEU 2.3 06/14/2017     Lab Results   Component Value Date    PLT 68 06/14/2017      Results reviewed, labs MET treatment parameters, ok to proceed with treatment.      Post Infusion Assessment:  Patient tolerated infusion without incident.  Patient tolerated injections without incident.  Blood return noted pre and post infusion.  Site patent and intact, free from redness, edema or discomfort.  No evidence of extravasations.  Access discontinued per protocol.    Discharge Plan:   Prescription refills given for furosemide at [facility] will refill synthroid.  Discharge instructions reviewed by RN and prescriptions by Matt Gomez Liaison with: Patient Maria Eugenia Pharm D reviewed cytoxan oral meds with patient and possible side effects.   Patient and/or family verbalized understanding of discharge instructions and all questions answered.  Copy of AVS reviewed with patient and/or family.  Patient will return 6/21/17 for next appointment.  AVS to patient via [employer].  Patient will return 6/21/17 for next appointment.   Patient discharged in stable condition accompanied by: self.  Departure Mode: Ambulatory.    Doug Rios RN

## 2017-06-14 NOTE — MR AVS SNAPSHOT
After Visit Summary   6/14/2017    Roc Parkinson    MRN: [id number]           Patient Information     Date Of Birth          7/7/1926        Visit Information        Provider Department      6/14/2017 9:00 AM  INFUSION CHAIR 17 [facility]        Today's Diagnoses     Hypercalcemia    -  1    Multiple myeloma not having achieved remission (H)        MDS (myelodysplastic syndrome) (H)           Follow-ups after your visit        Your next 10 appointments already scheduled     Jun 20, 2017  8:15 AM CDT   Treatment with Jose Young, PT   [facility] ([facility])    [address]  [address]  [facility] [address]   [phone]            Jun 21, 2017  9:00 AM CDT   Level 5 with  INFUSION CHAIR 19   [facility] ([facility])    [facility]  [address]  [facility] [address]   [phone]            Jun 27, 2017  8:15 AM CDT   Treatment with Jose Young, PT   [facility] ([facility])    [address]  [address]  [facility] [address]   [phone]            Jun 28, 2017  9:00 AM CDT   Level 5 with  INFUSION CHAIR 14   [facility] ([facility])    [facility]  [address]  [place] [address]   [phone]            Jun 28, 2017 10:15 AM CDT   Return Visit with Gretel Quinn MD   [facility] ([facility])    [facility]  [address]  [place] [address]   [phone]            Jun 30, 2017 10:30 AM CDT   Diabetic Education with  DIABETIC ED RESOURCE   [facility] ([facility])    [address] [address]   [phone]            Jul 06, 2017  8:15 AM CDT   Treatment with Jose Young, PT   [facility] ([facility])    [address]  [address]  [place] [address]   [phone]            Jul 11, 2017  8:15 AM CDT   Treatment with Rosalva Ruiz Dhiraj Martinez, PT   [facility] ([facility])    [address]  [address]  [place] [address]   [phone]            Jul 18, 2017  8:15 AM CDT   Treatment with Rosalva Ruiz Dhiraj Martinez, PT   [facility] ([facility])    [address] [place] [address]  [address]  [place] [address]   [phone]              Who to contact     If you have questions or need follow up information about today's clinic visit or your schedule please contact [facility] directly at [phone].  Normal or non-critical lab and imaging results will be communicated to you by MyChart, letter or phone within 4 business days after the clinic has received the results. If you do not hear from us within 7 days, please contact the clinic through [employer] or phone. If you have a critical or abnormal lab result, we will notify you by phone as soon as possible.  Submit refill requests through [employer] or call your pharmacy and they will forward the refill request to us. Please allow 3 business days for your refill to be completed.          Additional Information About Your Visit        [employer] Information     [employer] lets you send messages to your doctor, view your test results, renew your prescriptions, schedule appointments and more. To sign up, go to [email or web address] . Click on \"Log in\" on the left side of the screen, which will take you to the Welcome page. Then click on \"Sign up Now\" on the right side of the page.     You will be asked to enter the access code listed below, as well as some personal information. Please follow the directions to create your username and password.     Your access code is: [email or web address]  Expires: 6/27/2017 12:48 PM     Your " "access code will  in 90 days. If you need help or a new code, please call your [place] clinic or [phone].        Care EveryWhere ID     This is your Care EveryWhere ID. This could be used by other organizations to access your [place] medical records  [id number]        Your Vitals Were     Pulse Temperature Respirations Height Pulse Oximetry BMI (Body Mass Index)    61 96.9  F (36.1  C) (Oral) 16 1.778 m (5' 10\") 97% 27.86 kg/m2       Blood Pressure from Last 3 Encounters:   17 135/68   17 142/70   17 142/70    Weight from Last 3 Encounters:   17 88.1 kg (194 lb 3.2 oz)   17 88.9 kg (196 lb)   17 88.9 kg (196 lb)              We Performed the Following     Albumin level     Calcium     CBC with platelets differential     Creatinine     Treatment Conditions 2        Primary Care Provider Office Phone # Fax #    Dickson Recih [phone]       [facility] [address] [facility] [address]        Thank you!     Thank you for choosing [facility]  for your care. Our goal is always to provide you with excellent care. Hearing back from our patients is one way we can continue to improve our services. Please take a few minutes to complete the written survey that you may receive in the mail after your visit with us. Thank you!             Your Updated Medication List - Protect others around you: Learn how to safely use, store and throw away your medicines at www.disposemymeds.org.          This list is accurate as of: 17  4:22 PM.  Always use your most recent med list.                   Brand Name Dispense Instructions for use    acyclovir 400 MG tablet    ZOVIRAX    60 tablet    Take 1 tablet (400 mg) by mouth 2 times daily Viral Prophylaxis.       amLODIPine 5 MG tablet    NORVASC    30 tablet    TAKE 1 TABLET BY MOUTH DAILY       * B-D U/F 31G X 8 MM   Generic drug:  insulin pen needle       "    * insulin pen needle 31G X 8 MM    B-D U/F    100 each    Use 5 pen needles daily or as directed.       * cyclophosphamide 50 MG capsule CHEMO    CYTOXAN    24 capsule    Take 6 capsules (300 mg) by mouth once a week       * cyclophosphamide 50 MG capsule CHEMO    CYTOXAN    24 capsule    Take 6 capsules (300 mg) by mouth once a week       dexamethasone 4 MG tablet    DECADRON    40 tablet    Take 10 tablets (40 mg) by mouth every 7 days for 4 doses Take daily on Days 1, 8, 15, and 22.       ferrous sulfate 325 (65 FE) MG tablet    IRON     Take 325 mg by mouth daily (with breakfast)       FREESTYLE LITE test strip   Generic drug:  blood glucose monitoring     300 strip    USE TO TEST FOUR TIMES DAILY       furosemide 20 MG tablet    LASIX    30 tablet    Take 1 tablet (20 mg) by mouth daily       gemfibrozil 600 MG tablet    LOPID    180 tablet    TAKE 1 TABLET BY MOUTH TWICE DAILY       glipiZIDE 10 MG tablet    GLUCOTROL    180 tablet    TAKE 1 TABLET BY MOUTH TWICE DAILY BEFORE A MEAL       insulin aspart 100 UNIT/ML injection    NovoLOG PEN    12 mL    Inject 1-6 Units Subcutaneous 4 times daily (with meals and nightly) Before meal correction: 140-225 - take 1 unit  226-310 - take 2 units 311-395 - take 3 units 396-480 - take 4 units >481 - take 5 units Bedtime correction of: 200-299 give 1 unit 300-399 take 2 units 400 or greater take 3 units       insulin detemir 100 UNIT/ML injection    LEVEMIR FLEXPEN/FLEXTOUCH    15 mL    Inject 12 Units Subcutaneous every morning (before breakfast)       levothyroxine 25 MCG tablet    SYNTHROID/LEVOTHROID    90 tablet    TAKE 1 TABLET BY MOUTH EVERY DAY.       lisinopril 30 MG tablet    PRINIVIL,ZESTRIL    90 tablet    TAKE 1 TABLET BY MOUTH EVERY DAY       metoprolol 25 MG tablet    LOPRESSOR    180 tablet    TAKE 1 TABLET BY MOUTH TWICE DAILY       omeprazole 20 MG CR capsule    priLOSEC    90 capsule    TAKE 1 CAPSULE BY MOUTH EVERY DAY       ondansetron 8 MG tablet     ZOFRAN    10 tablet    Take 1 tablet (8 mg) by mouth every 8 hours as needed (Nausea/Vomiting)       polyethylene glycol powder    MIRALAX/GLYCOLAX         prochlorperazine 5 MG tablet    COMPAZINE    30 tablet    Take 1 tablet (5 mg) by mouth every 6 hours as needed (Nausea/Vomiting)       Selenium 200 MCG Tabs      Take 100 mcg by mouth daily. Pt takes one half tab of the 200 mcg daily       tamsulosin 0.4 MG capsule    FLOMAX    90 capsule    TAKE 1 CAPSULE BY MOUTH DAILY       VITAMIN C PO      Take 1,000 mg by mouth daily       VITAMIN D3 PO      Take 1,000 Units by mouth daily       * Notice:  This list has 4 medication(s) that are the same as other medications prescribed for you. Read the directions carefully, and ask your doctor or other care provider to review them with you.

## 2017-06-14 NOTE — PROGRESS NOTES
"Oral Chemotherapy Monitoring Program    Primary Oncologist: Dr. Quinn  Primary Oncology Clinic: [facility]  Cancer Diagnosis: Multiple Myeloma    Therapy History:  Started 4/20/17    Drug Interaction Assessment: no new drug interactions    Lab Monitoring Plan  C1D1+   CMP, CBC C2D1+ Call, CMP, CBC C3D1+ Call, CMP, CBC C4D1+ Call, CMP, CBC C5D1+ Call, CMP, CBC C6D1+ Call, CMP, CBC   C1D8+   C2D8+   C3D8+   C4D8+   C5D8+   C6D8+     C1D15+ Call C2D15+   C3D15+   C4D15+   C5D15+   C6D15+     C1D22+   C2D22+   C3D22+   C4D22+   C5D22+   C6D22+         Subjective/Objective:  Roc Parkinson is a 89 year old male seen in clinic for a follow-up visit for oral chemotherapy.  Spoke with Roc in clinic today. He stated he take all 6 of his capsules every Monday. He denies fatigue, nausea, or any other side effects at this time and generally feels well. \"There is nothing that he notices that is wrong\" he states.    ORAL CHEMOTHERAPY 4/12/2017 5/10/2017 6/14/2017   Drug Name Cytoxan (Cyclophsphamide) Cytoxan (Cyclophsphamide) Cytoxan (Cyclophsphamide)   Current Dosage 300mg 300mg 300mg   Current Schedule Weekly Weekly Weekly   Cycle Details Continuous Continuous Continuous   Start Date of Last Cycle - 4/20/2017 6/5/2017   Planned next cycle start date 4/19/2017 - 7/3/2017   Doses missed in last 2 weeks - 0 0   Adherence Assessment - Adherent Adherent   Adverse Effects - No AE identified during assessment No AE identified during assessment   Any new drug interactions? No No -   Is the dose as ordered appropriate for the patient? Yes Yes -   Is the patient currently in pain? - Assessed in last 30 days. -       Last PHQ-2 Score on record:   PHQ-2 ( [facility]) 5/22/2017 5/1/2017   Q1: Little interest or pleasure in doing things 0 0   Q2: Feeling down, depressed or hopeless 0 0   PHQ-2 Score 0 0       Patient does not report depression symptoms.      Vitals:  BP:   BP Readings from Last 1 Encounters:   06/14/17 150/67     Wt " "Readings from Last 1 Encounters:   06/14/17 88.1 kg (194 lb 3.2 oz)     Estimated body surface area is 2.09 meters squared as calculated from the following:    Height as of an earlier encounter on 6/14/17: 1.778 m (5' 10\").    Weight as of an earlier encounter on 6/14/17: 88.1 kg (194 lb 3.2 oz).    Labs:  Lab Results   Component Value Date     06/07/2017      Lab Results   Component Value Date    POTASSIUM 5.2 06/07/2017     Lab Results   Component Value Date    MICHELLE 9.7 06/14/2017     Lab Results   Component Value Date    ALBUMIN 3.1 06/14/2017     No results found for: MAG  No results found for: PHOS  Lab Results   Component Value Date    BUN 31 06/07/2017     Lab Results   Component Value Date    CR 1.35 06/14/2017       Lab Results   Component Value Date    AST 14 06/07/2017     Lab Results   Component Value Date    ALT 16 06/07/2017     Lab Results   Component Value Date    BILITOTAL 0.5 06/07/2017       Lab Results   Component Value Date    WBC 2.8 06/14/2017     Lab Results   Component Value Date    HGB 9.5 06/14/2017     Lab Results   Component Value Date    PLT 68 06/14/2017     Lab Results   Component Value Date    ANEU 2.3 06/14/2017       Platelets are low, but are watched and managed. Ok to continue.    Assessment:  Patient tolerating cytoxan well at this time.    Plan:  Continue weekly cytoxan.    Follow-Up:  4 weeks for a lab check.    Refill Due:  7/3/17  Or 7/10/17 patient unsure about how many capsules remain. Looks like we refilled it around 6/7, so likely good until 7/10    Maria Eugenia Cortes PharmD  June 14, 2017      "

## 2017-06-21 NOTE — PROGRESS NOTES
Infusion Nursing Note:  Roc Parkinson presents today for C7D15 Velcade.    Patient seen by provider today: No   present during visit today: Not Applicable.    Note: N/A.    Intravenous Access:  Lab draw site right hand, Needle type BF, Gauge 23.  Labs drawn without difficulty.    Treatment Conditions:  Lab Results   Component Value Date    HGB 9.5 06/21/2017     Lab Results   Component Value Date    WBC 4.5 06/21/2017      Lab Results   Component Value Date    ANEU 4.0 06/21/2017     Lab Results   Component Value Date    PLT 80 06/21/2017      Lab Results   Component Value Date     06/21/2017                   Lab Results   Component Value Date    POTASSIUM 4.8 06/21/2017           No results found for: MAG         Lab Results   Component Value Date    CR 1.12 06/21/2017                   Lab Results   Component Value Date    MICHELLE 8.9 06/21/2017                Lab Results   Component Value Date    BILITOTAL 0.5 06/21/2017           Lab Results   Component Value Date    ALBUMIN 2.9 06/21/2017                    Lab Results   Component Value Date    ALT 15 06/21/2017           Lab Results   Component Value Date    AST 12 06/21/2017     Results reviewed, labs MET treatment parameters, ok to proceed with treatment.      Post Infusion Assessment:  Patient tolerated injection without incident.  Site patent and intact, free from redness, edema or discomfort.  No evidence of extravasations.  Access discontinued per protocol.    Discharge Plan:   Discharge instructions reviewed with: Patient.  Patient and/or family verbalized understanding of discharge instructions and all questions answered.  Copy of AVS reviewed with patient and/or family.  Patient will return 6/28/17 for next appointment.  Patient discharged in stable condition accompanied by: self.  Departure Mode: Ambulatory.    Alberto Nash RN

## 2017-06-21 NOTE — MR AVS SNAPSHOT
After Visit Summary   6/21/2017    Roc Parkinson    MRN: [id number]           Patient Information     Date Of Birth          7/7/1926        Visit Information        Provider Department      6/21/2017 9:00 AM  INFUSION CHAIR 19 [facility]        Today's Diagnoses     Multiple myeloma not having achieved remission (H)    -  1       Follow-ups after your visit        Your next 10 appointments already scheduled     Jun 27, 2017  8:15 AM CDT   Treatment with Jose Young, PT   [facility] ([facility])    [address]  [address]  [facility] [address]   [phone]            Jun 28, 2017  9:00 AM CDT   Level 5 with  INFUSION CHAIR 14   [facility] ([facility])    [facility]  [address]  [facility] [address]   [phone]            Jun 28, 2017 10:15 AM CDT   Return Visit with Gretel Quinn MD   [facility] ([facility])    [facility]  [address]  [facility] [address]   [phone]            Jun 30, 2017 10:30 AM CDT   Diabetic Education with  DIABETIC ED RESOURCE   [facility]    [address] [address]   [phone]            Jul 06, 2017  8:15 AM CDT   Treatment with Jose Young, PT   [facility] ([facility])    [address]  [address]  [facility] [address]   [phone]            Jul 11, 2017  8:15 AM CDT   Treatment with Rosalva Martinez, PT   [facility] ([facility])    [address] [place] [address]  [address]  [facility] [address]   [phone]            Jul 13, 2017  9:30 AM CDT   Cystoscopy with Radha Jackson MD, [facility] [place] ([facility] [place])     "[address]  [address]  [place] [address]   [phone]            2017  8:15 AM CDT   Treatment with Rosalva Martinez PT   [facility] ([facility])    [address] [place] [address]  [address]  [place] [id number] [phone]              Who to contact     If you have questions or need follow up information about today's clinic visit or your schedule please contact [facility] directly at [phone].  Normal or non-critical lab and imaging results will be communicated to you by Talaentiahart, letter or phone within 4 business days after the clinic has received the results. If you do not hear from us within 7 days, please contact the clinic through Talaentiahart or phone. If you have a critical or abnormal lab result, we will notify you by phone as soon as possible.  Submit refill requests through Skipjump or call your pharmacy and they will forward the refill request to us. Please allow 3 business days for your refill to be completed.          Additional Information About Your Visit        TalaentiaharSeeSpace Information     Skipjump lets you send messages to your doctor, view your test results, renew your prescriptions, schedule appointments and more. To sign up, go to [email or web address] . Click on \"Log in\" on the left side of the screen, which will take you to the Welcome page. Then click on \"Sign up Now\" on the right side of the page.     You will be asked to enter the access code listed below, as well as some personal information. Please follow the directions to create your username and password.     Your access code is: [email or web address]  Expires: 2017 12:48 PM     Your access code will  in 90 days. If you need help or a new code, please call your [facility] or [phone].        Care EveryWhere ID     This is your Care EveryWhere ID. This could be used by other organizations to access your [place] medical " "records  [id number]        Your Vitals Were     Pulse Temperature Respirations Height BMI (Body Mass Index)       68 97.8  F (36.6  C) (Oral) 16 1.778 m (5' 10\") 27.89 kg/m2        Blood Pressure from Last 3 Encounters:   06/21/17 145/62   06/14/17 135/68   06/07/17 142/70    Weight from Last 3 Encounters:   06/21/17 88.2 kg (194 lb 6.4 oz)   06/14/17 88.1 kg (194 lb 3.2 oz)   06/07/17 88.9 kg (196 lb)              We Performed the Following     CBC with platelets differential     Comprehensive metabolic panel     Treatment Conditions 2        Primary Care Provider Office Phone # Fax #    Dickson Reich [phone]       [facility] [address]  [facility] [address]        Equal Access to Services     [facility] Hospital[facility] : Hadii aad ku hadasho Soomaali, waaxda luqadaha, qaybta kaalmada adeegyada, waxay idiin haysbn constantine shaw . So [facility] [phone].    ATENCIÓN: Si habla español, tiene a nolan disposición servicios gratuitos de asistencia lingüística. Llame al [phone].    We comply with applicable federal civil rights laws and [place] laws. We do not discriminate on the basis of race, color, national origin, age, disability sex, sexual orientation or gender identity.            Thank you!     Thank you for choosing [facility]  for your care. Our goal is always to provide you with excellent care. Hearing back from our patients is one way we can continue to improve our services. Please take a few minutes to complete the written survey that you may receive in the mail after your visit with us. Thank you!             Your Updated Medication List - Protect others around you: Learn how to safely use, store and throw away your medicines at www.disposemymeds.org.          This list is accurate as of: 6/21/17 10:07 AM.  Always use your most recent med list.                   Brand Name Dispense Instructions for use Diagnosis    acyclovir " 400 MG tablet    ZOVIRAX    60 tablet    Take 1 tablet (400 mg) by mouth 2 times daily Viral Prophylaxis.    Multiple myeloma not having achieved remission (H)       amLODIPine 5 MG tablet    NORVASC    30 tablet    TAKE 1 TABLET BY MOUTH DAILY    Essential hypertension, benign       * B-D U/F 31G X 8 MM   Generic drug:  insulin pen needle           * insulin pen needle 31G X 8 MM    B-D U/F    100 each    Use 5 pen needles daily or as directed.    Type 2 diabetes mellitus with stage 3 chronic kidney disease, with long-term current use of insulin (H)       cyclophosphamide 50 MG capsule CHEMO    CYTOXAN    24 capsule    Take 6 capsules (300 mg) by mouth once a week    Multiple myeloma not having achieved remission (H)       dexamethasone 4 MG tablet    DECADRON    40 tablet    Take 10 tablets (40 mg) by mouth every 7 days for 4 doses Take daily on Days 1, 8, 15, and 22.    Multiple myeloma not having achieved remission (H)       ferrous sulfate 325 (65 FE) MG tablet    IRON     Take 325 mg by mouth daily (with breakfast)        FREESTYLE LITE test strip   Generic drug:  blood glucose monitoring     300 strip    USE TO TEST FOUR TIMES DAILY    Uncontrolled type 1 diabetes mellitus with stage 3 chronic kidney disease (H)       furosemide 20 MG tablet    LASIX    30 tablet    Take 1 tablet (20 mg) by mouth daily    Multiple myeloma not having achieved remission (H)       gemfibrozil 600 MG tablet    LOPID    180 tablet    TAKE 1 TABLET BY MOUTH TWICE DAILY    Hyperlipidemia       glipiZIDE 10 MG tablet    GLUCOTROL    180 tablet    TAKE 1 TABLET BY MOUTH TWICE DAILY BEFORE A MEAL    Type 2 diabetes mellitus with stage 3 chronic kidney disease, with long-term current use of insulin (H)       insulin aspart 100 UNIT/ML injection    NovoLOG PEN    12 mL    Inject 1-6 Units Subcutaneous 4 times daily (with meals and nightly) Before meal correction: 140-225 - take 1 unit  226-310 - take 2 units 311-395 - take 3 units  396-480 - take 4 units >481 - take 5 units Bedtime correction of: 200-299 give 1 unit 300-399 take 2 units 400 or greater take 3 units    Uncontrolled type 2 diabetes mellitus with hyperglycemia, unspecified long term insulin use status (H)       insulin detemir 100 UNIT/ML injection    LEVEMIR FLEXPEN/FLEXTOUCH    15 mL    Inject 12 Units Subcutaneous every morning (before breakfast)    Type 2 diabetes mellitus with diabetic chronic kidney disease, unspecified CKD stage, unspecified long term insulin use status (H)       levothyroxine 25 MCG tablet    SYNTHROID/LEVOTHROID    90 tablet    TAKE 1 TABLET BY MOUTH EVERY DAY.    Hypothyroidism       lisinopril 30 MG tablet    PRINIVIL,ZESTRIL    90 tablet    TAKE 1 TABLET BY MOUTH EVERY DAY    Essential hypertension, benign       metoprolol 25 MG tablet    LOPRESSOR    180 tablet    TAKE 1 TABLET BY MOUTH TWICE DAILY    Essential hypertension, hypertension with unspecified goal       omeprazole 20 MG CR capsule    priLOSEC    90 capsule    TAKE 1 CAPSULE BY MOUTH EVERY DAY    Congenital hiatus hernia       ondansetron 8 MG tablet    ZOFRAN    10 tablet    Take 1 tablet (8 mg) by mouth every 8 hours as needed (Nausea/Vomiting)    Multiple myeloma not having achieved remission (H)       polyethylene glycol powder    MIRALAX/GLYCOLAX          prochlorperazine 5 MG tablet    COMPAZINE    30 tablet    Take 1 tablet (5 mg) by mouth every 6 hours as needed (Nausea/Vomiting)    Multiple myeloma not having achieved remission (H)       Selenium 200 MCG Tabs      Take 100 mcg by mouth daily. Pt takes one half tab of the 200 mcg daily        tamsulosin 0.4 MG capsule    FLOMAX    90 capsule    TAKE 1 CAPSULE BY MOUTH DAILY    Malignant neoplasm of prostate (H)       VITAMIN C PO      Take 1,000 mg by mouth daily        VITAMIN D3 PO      Take 1,000 Units by mouth daily        * Notice:  This list has 2 medication(s) that are the same as other medications prescribed for you. Read  the directions carefully, and ask your doctor or other care provider to review them with you.

## 2017-06-27 PROBLEM — T45.1X5A CHEMOTHERAPY-INDUCED NEUTROPENIA (H): Status: ACTIVE | Noted: 2017-01-01

## 2017-06-27 PROBLEM — D70.1 CHEMOTHERAPY-INDUCED NEUTROPENIA (H): Status: ACTIVE | Noted: 2017-01-01

## 2017-06-27 PROBLEM — D63.0 ANEMIA IN NEOPLASTIC DISEASE: Status: ACTIVE | Noted: 2017-01-01

## 2017-06-27 NOTE — PATIENT INSTRUCTIONS
I will see the patient back in August and he will need labs at that time.  He has an appointment this week with hematology oncology.  He gets routine testing done in their office.  When he returns in August he will need his diabetes test done.  We'll continue with his current regimen.  He has increased his dose of insulin by a unit every once in a while at meals when his blood sugars been higher.  That has worked pretty well for him.

## 2017-06-27 NOTE — MR AVS SNAPSHOT
After Visit Summary   6/27/2017    Roc Parkinson    MRN: [id number]           Patient Information     Date Of Birth          7/7/1926        Visit Information        Provider Department      6/27/2017 10:30 AM [facility]         Follow-ups after your visit        Your next 10 appointments already scheduled     Jun 28, 2017  9:00 AM CDT   Level 5 with SH INFUSION CHAIR 14   [facility] ([facility])    [facility]  [address]  [place] [address]   [phone]            Jun 28, 2017 10:15 AM CDT   Return Visit with Gretel Quinn MD   [facility] ([facility])    [facility]  [address]  [place] [address]   [phone]            Jun 30, 2017 10:30 AM CDT   Diabetic Education with  DIABETIC ED RESOURCE   [facility] ([facility])    [address] [address]   [phone]            Jul 06, 2017  8:15 AM CDT   Treatment with Jose Young, PT   [facility] ([facility])    [address]  [address]  [facility] [address]   [phone]            Jul 11, 2017  8:15 AM CDT   Treatment with Rosalva Martinez, PT   [facility] ([facility])    [address]  [address]  [facility] [address]   [phone]            Jul 13, 2017  9:30 AM CDT   Cystoscopy with Radha Jackson MD, UA CYF   [facility] [place] ([facility] [place])    [address]  [address]  [facility] [address] [phone]            Jul 18, 2017  8:15 AM CDT   Treatment with Rosalva Martinez, PT   [facility] ([facility])    [address]  [address]  [facility] [address]  "  [phone]            Aug 07, 2017 10:30 AM CDT   SHORT with Dickson Reich MD   [facility]    [address] [place] [address] [phone]              Who to contact     If you have questions or need follow up information about today's clinic visit or your schedule please contact [facility] directly at [phone].  Normal or non-critical lab and imaging results will be communicated to you by XAPPmediahart, letter or phone within 4 business days after the clinic has received the results. If you do not hear from us within 7 days, please contact the clinic through XAPPmediahart or phone. If you have a critical or abnormal lab result, we will notify you by phone as soon as possible.  Submit refill requests through Mocavo or call your pharmacy and they will forward the refill request to us. Please allow 3 business days for your refill to be completed.          Additional Information About Your Visit        Mocavo Information     Mocavo lets you send messages to your doctor, view your test results, renew your prescriptions, schedule appointments and more. To sign up, go to [email or web address] . Click on \"Log in\" on the left side of the screen, which will take you to the Welcome page. Then click on \"Sign up Now\" on the right side of the page.     You will be asked to enter the access code listed below, as well as some personal information. Please follow the directions to create your username and password.     Your access code is: [email or web address]  Expires: 2017 12:48 PM     Your access code will  in 90 days. If you need help or a new code, please call your [facility] or [phone].        Care EveryWhere ID     This is your Care EveryWhere ID. This could be used by other organizations to access your [place] medical records  [id number]        Your Vitals Were     " Pulse Temperature Respirations BMI (Body Mass Index)          63 98  F (36.7  C) (Tympanic) 16 27.26 kg/m2         Blood Pressure from Last 3 Encounters:   06/27/17 128/60   06/21/17 145/62   06/14/17 135/68    Weight from Last 3 Encounters:   06/27/17 190 lb (86.2 kg)   06/21/17 194 lb 6.4 oz (88.2 kg)   06/14/17 194 lb 3.2 oz (88.1 kg)              Today, you had the following     No orders found for display       Primary Care Provider Office Phone # Fax #    Dickson Reich [phone]       [facility] [address] [facility] [address]        Equal Access to Services     ELIE GAGNON : Hadii [place] ku hadasho Soomaali, waaxda luqadaha, qaybta kaalmada adeegyada, waxmely idiin hayaan constantine shaw . So [facility] [phone].    ATENCIÓN: Si habla español, tiene a nolan disposición servicios gratuitos de asistencia lingüística. Llame al [phone].    We comply with applicable federal civil rights laws and [place] laws. We do not discriminate on the basis of race, color, national origin, age, disability sex, sexual orientation or gender identity.            Thank you!     Thank you for choosing [facility]  for your care. Our goal is always to provide you with excellent care. Hearing back from our patients is one way we can continue to improve our services. Please take a few minutes to complete the written survey that you may receive in the mail after your visit with us. Thank you!             Your Updated Medication List - Protect others around you: Learn how to safely use, store and throw away your medicines at www.disposemymeds.org.          This list is accurate as of: 6/27/17 11:46 AM.  Always use your most recent med list.                   Brand Name Dispense Instructions for use Diagnosis    acyclovir 400 MG tablet    ZOVIRAX    60 tablet    Take 1 tablet (400 mg) by mouth 2 times daily Viral Prophylaxis.    Multiple myeloma not having  achieved remission (H)       amLODIPine 5 MG tablet    NORVASC    30 tablet    TAKE 1 TABLET BY MOUTH DAILY    Essential hypertension, benign       * B-D U/F 31G X 8 MM   Generic drug:  insulin pen needle           * insulin pen needle 31G X 8 MM    B-D U/F    100 each    Use 5 pen needles daily or as directed.    Type 2 diabetes mellitus with stage 3 chronic kidney disease, with long-term current use of insulin (H)       cyclophosphamide 50 MG capsule CHEMO    CYTOXAN    24 capsule    Take 6 capsules (300 mg) by mouth once a week    Multiple myeloma not having achieved remission (H)       dexamethasone 4 MG tablet    DECADRON    40 tablet    Take 10 tablets (40 mg) by mouth every 7 days for 4 doses Take daily on Days 1, 8, 15, and 22.    Multiple myeloma not having achieved remission (H)       ferrous sulfate 325 (65 FE) MG tablet    IRON     Take 325 mg by mouth daily (with breakfast)        FREESTYLE LITE test strip   Generic drug:  blood glucose monitoring     300 strip    USE TO TEST FOUR TIMES DAILY    Uncontrolled type 1 diabetes mellitus with stage 3 chronic kidney disease (H)       furosemide 20 MG tablet    LASIX    30 tablet    Take 1 tablet (20 mg) by mouth daily    Multiple myeloma not having achieved remission (H)       gemfibrozil 600 MG tablet    LOPID    180 tablet    TAKE 1 TABLET BY MOUTH TWICE DAILY    Hyperlipidemia       glipiZIDE 10 MG tablet    GLUCOTROL    180 tablet    TAKE 1 TABLET BY MOUTH TWICE DAILY BEFORE A MEAL    Type 2 diabetes mellitus with stage 3 chronic kidney disease, with long-term current use of insulin (H)       insulin aspart 100 UNIT/ML injection    NovoLOG PEN    12 mL    Inject 1-6 Units Subcutaneous 4 times daily (with meals and nightly) Before meal correction: 140-225 - take 1 unit  226-310 - take 2 units 311-395 - take 3 units 396-480 - take 4 units >481 - take 5 units Bedtime correction of: 200-299 give 1 unit 300-399 take 2 units 400 or greater take 3 units     Uncontrolled type 2 diabetes mellitus with hyperglycemia, unspecified long term insulin use status (H)       insulin detemir 100 UNIT/ML injection    LEVEMIR FLEXPEN/FLEXTOUCH    15 mL    Inject 12 Units Subcutaneous every morning (before breakfast)    Type 2 diabetes mellitus with diabetic chronic kidney disease, unspecified CKD stage, unspecified long term insulin use status (H)       levothyroxine 25 MCG tablet    SYNTHROID/LEVOTHROID    90 tablet    TAKE 1 TABLET BY MOUTH EVERY DAY.    Hypothyroidism       lisinopril 30 MG tablet    PRINIVIL,ZESTRIL    90 tablet    TAKE 1 TABLET BY MOUTH EVERY DAY    Essential hypertension, benign       metoprolol 25 MG tablet    LOPRESSOR    180 tablet    TAKE 1 TABLET BY MOUTH TWICE DAILY    Essential hypertension, hypertension with unspecified goal       omeprazole 20 MG CR capsule    priLOSEC    90 capsule    TAKE 1 CAPSULE BY MOUTH EVERY DAY    Congenital hiatus hernia       ondansetron 8 MG tablet    ZOFRAN    10 tablet    Take 1 tablet (8 mg) by mouth every 8 hours as needed (Nausea/Vomiting)    Multiple myeloma not having achieved remission (H)       polyethylene glycol powder    MIRALAX/GLYCOLAX          prochlorperazine 5 MG tablet    COMPAZINE    30 tablet    Take 1 tablet (5 mg) by mouth every 6 hours as needed (Nausea/Vomiting)    Multiple myeloma not having achieved remission (H)       Selenium 200 MCG Tabs      Take 100 mcg by mouth daily. Pt takes one half tab of the 200 mcg daily        tamsulosin 0.4 MG capsule    FLOMAX    90 capsule    TAKE 1 CAPSULE BY MOUTH DAILY    Malignant neoplasm of prostate (H)       VITAMIN C PO      Take 1,000 mg by mouth daily        VITAMIN D3 PO      Take 1,000 Units by mouth daily        * Notice:  This list has 2 medication(s) that are the same as other medications prescribed for you. Read the directions carefully, and ask your doctor or other care provider to review them with you.

## 2017-06-27 NOTE — NURSING NOTE
"Chief Complaint   Patient presents with     Diabetes       Initial /60  Pulse 63  Temp 98  F (36.7  C) (Tympanic)  Resp 16  Wt 190 lb (86.2 kg)  BMI 27.26 kg/m2 Estimated body mass index is 27.26 kg/(m^2) as calculated from the following:    Height as of 6/21/17: 5' 10\" (1.778 m).    Weight as of this encounter: 190 lb (86.2 kg).  Medication Reconciliation: complete     Mariia Allen CMA      "

## 2017-06-27 NOTE — PROGRESS NOTES
SUBJECTIVE:                                                    Roc Parkinson is a 89 year old male who presents to clinic today for the following health issues:      Diabetes Follow-up    Patient is checking blood sugars: four times daily.    Blood sugar testing frequency justification: Uncontrolled diabetes  Results are as follows:         90's-490's    Diabetic concerns: blood sugar frequently over 200     Symptoms of hypoglycemia (low blood sugar): none     Paresthesias (numbness or burning in feet) or sores: No     Date of last diabetic eye exam: UTD        Amount of exercise or physical activity: 2-3 days/week for an average of 15-30 minutes    Problems taking medications regularly: No    Medication side effects: none    Diet: regular (no restrictions)          Problem list and histories reviewed & adjusted, as indicated.  Additional history: The days that follow his Decadron dosing are really problematic for his diabetes.  If it weren't for those days he would usually be down in the low to mid 100s.  However on the days after Decadron he is frequently up over the mid 200s.  It's not clear how much longer he is going to need to be treated for his multiple myeloma.    Patient Active Problem List   Diagnosis     Dysphagia     Malignant neoplasm of prostate (H)     Malignant neoplasm of bladder (H)     Essential hypertension, benign     Asymptomatic varicose veins     Congenital hiatus hernia     Oral lesion     CTS (carpal tunnel syndrome)     Type 2 diabetes mellitus with renal manifestations (H)     Irritable bowel syndrome     Vitamin D deficiency disease     Microalbuminuria     Obesity     UTI (urinary tract infection) w hx of recurrence     Iron deficiency anemia     Nonsmoker     A-fib (H)     Health Care Home     Hyperlipidemia     CKD (chronic kidney disease) stage 3, GFR 30-59 ml/min     Hypothyroidism     Long-term (current) use of anticoagulants [Z79.01]     Macrocytic anemia     GIB  (gastrointestinal bleeding)     Multiple myeloma not having achieved remission (H)     Hypercalcemia     Hyperglycemia     Urinary tract infection     Hyperkalemia     ACP (advance care planning)     MDS (myelodysplastic syndrome) (H)     Chemotherapy-induced neutropenia (H)     Anemia in neoplastic disease     Past Surgical History:   Procedure Laterality Date     ARTHROPLASTY KNEE  11/5/2012    Procedure: ARTHROPLASTY KNEE;  RIGHT TOTAL KNEE ARTHROPLASTY (SMITH & NEPHEW)^;  Surgeon: Dickson Schulte MD;  Location: [facility] OR     BIOPSY      bladder     COLONOSCOPY  2007     COLONOSCOPY N/A 11/15/2016    Procedure: COMBINED COLONOSCOPY, SINGLE OR MULTIPLE BIOPSY/POLYPECTOMY BY BIOPSY;  Surgeon: Kenneth Fulton MD;  Location:  GI     GENITOURINARY SURGERY      TURP x2 and bladder scraping     GI SURGERY      anal fistula     ORTHOPEDIC SURGERY      lt knee in nov.2009     SOFT TISSUE SURGERY      melanoma       Social History   Substance Use Topics     Smoking status: Never Smoker     Smokeless tobacco: Never Used     Alcohol use No     Family History   Problem Relation Age of Onset     Cardiovascular Father 81     heart arrythmia     Endocrine Disease Brother 74     Paget's           Reviewed and updated as needed this visit by clinical staff  Tobacco  Allergies  Meds  Med Hx  Surg Hx  Fam Hx  Soc Hx      Reviewed and updated as needed this visit by Provider         ROS:  CONSTITUTIONAL:NEGATIVE for fever, chills, change in weight  RESP:NEGATIVE for significant cough or SOB  CV: NEGATIVE for chest pain, palpitations or peripheral edema  ENDOCRINE: NEGATIVE for temperature intolerance, skin/hair changes, polydipsia, polyphagia and polyuria    OBJECTIVE:                                                    /60  Pulse 63  Temp 98  F (36.7  C) (Tympanic)  Resp 16  Wt 190 lb (86.2 kg)  BMI 27.26 kg/m2  Body mass index is 27.26 kg/(m^2).  GENERAL APPEARANCE: healthy, alert and no  distress    Diagnostic test results:  Results for orders placed or performed in visit on 06/21/17   CBC with platelets differential   Result Value Ref Range    WBC 4.5 4.0 - 11.0 10e9/L    RBC Count 2.57 (L) 4.4 - 5.9 10e12/L    Hemoglobin 9.5 (L) 13.3 - 17.7 g/dL    Hematocrit 27.5 (L) 40.0 - 53.0 %     (H) 78 - 100 fl    MCH 37.0 (H) 26.5 - 33.0 pg    MCHC 34.5 31.5 - 36.5 g/dL    RDW 24.2 (H) 10.0 - 15.0 %    Platelet Count 80 (L) 150 - 450 10e9/L    Diff Method Automated Method     % Neutrophils 89.1 %    % Lymphocytes 8.3 %    % Monocytes 1.3 %    % Eosinophils 0.2 %    % Basophils 0.2 %    % Immature Granulocytes 0.9 %    Nucleated RBCs 0 0 /100    Absolute Neutrophil 4.0 1.6 - 8.3 10e9/L    Absolute Lymphocytes 0.4 (L) 0.8 - 5.3 10e9/L    Absolute Monocytes 0.1 0.0 - 1.3 10e9/L    Absolute Eosinophils 0.0 0.0 - 0.7 10e9/L    Absolute Basophils 0.0 0.0 - 0.2 10e9/L    Abs Immature Granulocytes 0.0 0 - 0.4 10e9/L    Absolute Nucleated RBC 0.0    Comprehensive metabolic panel   Result Value Ref Range    Sodium 136 133 - 144 mmol/L    Potassium 4.8 3.4 - 5.3 mmol/L    Chloride 106 94 - 109 mmol/L    Carbon Dioxide 20 20 - 32 mmol/L    Anion Gap 10 3 - 14 mmol/L    Glucose 306 (H) 70 - 99 mg/dL    Urea Nitrogen 33 (H) 7 - 30 mg/dL    Creatinine 1.12 0.66 - 1.25 mg/dL    GFR Estimate 61 >60 mL/min/1.7m2    GFR Estimate If Black 74 >60 mL/min/1.7m2    Calcium 8.9 8.5 - 10.1 mg/dL    Bilirubin Total 0.5 0.2 - 1.3 mg/dL    Albumin 2.9 (L) 3.4 - 5.0 g/dL    Protein Total 8.2 6.8 - 8.8 g/dL    Alkaline Phosphatase 81 40 - 150 U/L    ALT 15 0 - 70 U/L    AST 12 0 - 45 U/L        ASSESSMENT/PLAN:                                                        ICD-10-CM    1. Type 2 diabetes mellitus with diabetic nephropathy, with long-term current use of insulin (H) E11.21     Z79.4    2. Chemotherapy-induced neutropenia (H) D70.1    3. 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  He has an appointment this week with hematology oncology.  He gets routine testing done in their office.  When he returns in August he will need his diabetes test done.  We'll continue with his current regimen.  He has increased his dose of insulin by a unit every once in a while at meals when his blood sugars been higher.  That has worked pretty well for him.      Dickson Reich MD  [facility]

## 2017-06-28 NOTE — PROGRESS NOTES
"Oncology Rooming Note    June 28, 2017 9:49 AM   Roc Parkinson is a 89 year old male who presents for:    Chief Complaint   Patient presents with     Oncology Clinic Visit     Initial Vitals: /72  Pulse 61  Temp 97.7  F (36.5  C) (Oral)  Resp 19  Wt 88.1 kg (194 lb 3.2 oz)  SpO2 94%  BMI 27.86 kg/m2 Estimated body mass index is 27.86 kg/(m^2) as calculated from the following:    Height as of 6/21/17: 1.778 m (5' 10\").    Weight as of this encounter: 88.1 kg (194 lb 3.2 oz). Body surface area is 2.09 meters squared.  Extreme Pain (8) Comment: bilateral hands, maily right, intermittent   No LMP for male patient.  Allergies reviewed: Yes  Medications reviewed: Yes    Medications: Medication refills not needed today.  Pharmacy name entered into [employer]: [email or web address] [place] [address] AT [facility] [address]    Clinical concerns: None     8 minutes for nursing intake (face to face time)     Diana Mosley [facility]    DISCHARGE PLAN:  Next appointments: See patient instruction section-- Janice  Departure Mode: Ambulatory  Accompanied by: Self  10 minutes for nursing discharge (face to face time)   Bere Pace RN    1.  Discontinue Velcade--Pt aware & dc'd appts.  2.  Start next cycle of cyclophosphamide, dexamethasone 7/5/17. Scheduled/Janice--[facility] spoke with Pt as Pt was getting confused with his medications.  2.  Continue Zometa   Scheduled/Janice  4. Continue Aranesp 300 mcg SQ every three weeks   Scheduled/Janice  5- RTC MD 3 weeks  Scheduled/Janice  6- Continue acyclovir--Pt verbalized understanding.  7- Start neurontin 200 mg every night-Pt verbalized understanding.      AVS printed & given to patient/Janice 7/5/2017 Wed  8:30 AM  8:30 A 300 [facility] [[id number]]  INFUSION CHAIR 6 [[id number]] LEVEL 5 [667] (oral Cytoxan, Dex), labs, Possible Aranesp, Transfusion       7/12/2017 Wed  2:00 PM  2:00 P 60 [facility] [[id number]]  INFUSION CHAIR 3 " [[id number]] LEVEL 1 [663] Zometa Infusion        7/19/2017 Wed 11:15 AM 11:15 A 15 [facility] [[id number]] ALISON JON [[id number]] RETURN [657] Return - Multiple Myeloma       7/26/2017 Wed 10:00 AM 10:00 A 180 [facility] [[id number]]  INFUSION CHAIR 9 [[id number]] LEVEL 3 [665] Lab Draw, Aranesp, Possible Transfusion        8/9/2017 Wed  1:30 PM  1:30 P 60 [facility] [[id number]] [facility] INFUSION CHAIR 12 [[id number]] LEVEL 1 [663] Zometa Infusion

## 2017-06-28 NOTE — MR AVS SNAPSHOT
After Visit Summary   6/28/2017    Roc Parkinson    MRN: [id number]           Patient Information     Date Of Birth          7/7/1926        Visit Information        Provider Department      6/28/2017 9:00 AM  INFUSION CHAIR 14 [facility]        Today's Diagnoses     Multiple myeloma not having achieved remission (H)    -  1       Follow-ups after your visit        Your next 10 appointments already scheduled     Jun 30, 2017 10:30 AM CDT   Diabetic Education with  DIABETIC ED RESOURCE   [facility]    [address] [address]   [phone]            Jul 05, 2017  8:30 AM CDT   Level 5 with  INFUSION CHAIR 6   [facility] ([facility])    [facility]  [address]  [facility] [address]   [phone]            Jul 06, 2017  8:15 AM CDT   Treatment with Jose Young, PT   [facility] ([facility])    [address] [place] [address]  [address]  [facility] [address]   [phone]            Jul 11, 2017  8:15 AM CDT   Treatment with Rosalva Martinez, PT   [facility] ([facility])    [address] [place] [address]  [address]  [facility] [address]   [phone]            Jul 12, 2017  2:00 PM CDT   Level 1 with  INFUSION CHAIR 3   [facility] ([facility])    [facility]  [address]  [facility] [address]   [phone]            Jul 13, 2017  9:30 AM CDT   Cystoscopy with Radha Jackson MD, [facility] [place]    [address]  [address]  [facility] [address]   [phone]            Jul 18, 2017  8:15 AM CDT   Treatment with Rosalva Martinez, PT   [facility]  [facility] ([facility])    [address]  [address]  [place] [address]   [phone]            Jul 19, 2017 11:15 AM CDT   Return Visit with Gretel Quinn MD   [facility] ([facility])    [facility] [place]  [address]  [place] [address] [phone]            Jul 26, 2017 10:00 AM CDT   Level 3 with  INFUSION CHAIR 9   [facility] ([facility])    [facility]  [address]  [place] [address] [phone]              Who to contact     If you have questions or need follow up information about today's clinic visit or your schedule please contact [facility] directly at [phone].  Normal or non-critical lab and imaging results will be communicated to you by MyChart, letter or phone within 4 business days after the clinic has received the results. If you do not hear from us within 7 days, please contact the clinic through [employer] or phone. If you have a critical or abnormal lab result, we will notify you by phone as soon as possible.  Submit refill requests through [employer] or call your pharmacy and they will forward the refill request to us. Please allow 3 business days for your refill to be completed.          Additional Information About Your Visit        Care EveryWhere ID     This is your Care EveryWhere ID. This could be used by other organizations to access your [facility] medical records  [id number]        Your Vitals Were     Pulse Temperature Respirations Height Pulse Oximetry BMI (Body Mass Index)    61 97.7  F (36.5  C) (Oral) 18 1.778 m (5' 10\") 94% 27.86 kg/m2       Blood Pressure from Last 3 Encounters:   06/28/17 150/72   06/28/17 150/72   06/27/17 128/60    Weight from Last 3 Encounters:   06/28/17 88.1 kg (194 lb 3.2 oz)   06/28/17 88.1 kg (194 lb 3.2 oz)   06/27/17 86.2 kg (190 lb)              We " Performed the Following     CBC with platelets differential          Today's Medication Changes          These changes are accurate as of: 6/28/17  3:33 PM.  If you have any questions, ask your nurse or doctor.               Start taking these medicines.        Dose/Directions    gabapentin 100 MG capsule   Commonly known as:  NEURONTIN   Used for:  Neuropathy (H)   Started by:  Gretel Quinn MD        Dose:  200 mg   Take 2 capsules (200 mg) by mouth At Bedtime   Quantity:  90 capsule   Refills:  3            Where to get your medicines      These medications were sent to [facility] [place], [place] - [address]  [address], [place] [address]     Phone:  [phone]     gabapentin 100 MG capsule                Primary Care Provider Office Phone # Fax #    Dickson Clark Reich [phone]       [facility] [address]  [facility] [address]        Equal Access to Services     [facility]: Hadii virginia ku hadasho Somaeali, waaxda luqadaha, qaybta kaalmada adeegyada, melita burtin haymarge shaw . So [facility] [phone].    ATENCIÓN: Si habla español, tiene a nolan disposición servicios gratuitos de asistencia lingüística. [facility] [phone].    We comply with applicable federal civil rights laws and [place] laws. We do not discriminate on the basis of race, color, national origin, age, disability sex, sexual orientation or gender identity.            Thank you!     Thank you for choosing [facility]  for your care. Our goal is always to provide you with excellent care. Hearing back from our patients is one way we can continue to improve our services. Please take a few minutes to complete the written survey that you may receive in the mail after your visit with us. Thank you!             Your Updated Medication List - Protect others around you: Learn how to safely use, store and throw away your medicines at  www.disposemymeds.org.          This list is accurate as of: 6/28/17  3:33 PM.  Always use your most recent med list.                   Brand Name Dispense Instructions for use Diagnosis    acyclovir 400 MG tablet    ZOVIRAX    60 tablet    Take 1 tablet (400 mg) by mouth 2 times daily Viral Prophylaxis.    Multiple myeloma not having achieved remission (H)       amLODIPine 5 MG tablet    NORVASC    30 tablet    TAKE 1 TABLET BY MOUTH DAILY    Essential hypertension, benign       * B-D U/F 31G X 8 MM   Generic drug:  insulin pen needle           * insulin pen needle 31G X 8 MM    B-D U/F    100 each    Use 5 pen needles daily or as directed.    Type 2 diabetes mellitus with stage 3 chronic kidney disease, with long-term current use of insulin (H)       cyclophosphamide 50 MG capsule CHEMO    CYTOXAN    24 capsule    Take 6 capsules (300 mg) by mouth once a week    Multiple myeloma not having achieved remission (H)       dexamethasone 4 MG tablet    DECADRON    40 tablet    Take 10 tablets (40 mg) by mouth every 7 days for 4 doses Take daily on Days 1, 8, 15, and 22.    Multiple myeloma not having achieved remission (H)       ferrous sulfate 325 (65 FE) MG tablet    IRON     Take 325 mg by mouth daily (with breakfast)        FREESTYLE LITE test strip   Generic drug:  blood glucose monitoring     300 strip    USE TO TEST FOUR TIMES DAILY    Uncontrolled type 1 diabetes mellitus with stage 3 chronic kidney disease (H)       furosemide 20 MG tablet    LASIX    30 tablet    Take 1 tablet (20 mg) by mouth daily    Multiple myeloma not having achieved remission (H)       gabapentin 100 MG capsule    NEURONTIN    90 capsule    Take 2 capsules (200 mg) by mouth At Bedtime    Neuropathy (H)       gemfibrozil 600 MG tablet    LOPID    180 tablet    TAKE 1 TABLET BY MOUTH TWICE DAILY    Hyperlipidemia       glipiZIDE 10 MG tablet    GLUCOTROL    180 tablet    TAKE 1 TABLET BY MOUTH TWICE DAILY BEFORE A MEAL    Type 2 diabetes  mellitus with stage 3 chronic kidney disease, with long-term current use of insulin (H)       insulin aspart 100 UNIT/ML injection    NovoLOG PEN    12 mL    Inject 1-6 Units Subcutaneous 4 times daily (with meals and nightly) Before meal correction: 140-225 - take 1 unit  226-310 - take 2 units 311-395 - take 3 units 396-480 - take 4 units >481 - take 5 units Bedtime correction of: 200-299 give 1 unit 300-399 take 2 units 400 or greater take 3 units    Uncontrolled type 2 diabetes mellitus with hyperglycemia, unspecified long term insulin use status (H)       insulin detemir 100 UNIT/ML injection    LEVEMIR FLEXPEN/FLEXTOUCH    15 mL    Inject 12 Units Subcutaneous every morning (before breakfast)    Type 2 diabetes mellitus with diabetic chronic kidney disease, unspecified CKD stage, unspecified long term insulin use status (H)       levothyroxine 25 MCG tablet    SYNTHROID/LEVOTHROID    90 tablet    TAKE 1 TABLET BY MOUTH EVERY DAY.    Hypothyroidism       lisinopril 30 MG tablet    PRINIVIL,ZESTRIL    90 tablet    TAKE 1 TABLET BY MOUTH EVERY DAY    Essential hypertension, benign       metoprolol 25 MG tablet    LOPRESSOR    180 tablet    TAKE 1 TABLET BY MOUTH TWICE DAILY    Essential hypertension, hypertension with unspecified goal       omeprazole 20 MG CR capsule    priLOSEC    90 capsule    TAKE 1 CAPSULE BY MOUTH EVERY DAY    Congenital hiatus hernia       ondansetron 8 MG tablet    ZOFRAN    10 tablet    Take 1 tablet (8 mg) by mouth every 8 hours as needed (Nausea/Vomiting)    Multiple myeloma not having achieved remission (H)       polyethylene glycol powder    MIRALAX/GLYCOLAX          prochlorperazine 5 MG tablet    COMPAZINE    30 tablet    Take 1 tablet (5 mg) by mouth every 6 hours as needed (Nausea/Vomiting)    Multiple myeloma not having achieved remission (H)       Selenium 200 MCG Tabs      Take 100 mcg by mouth daily. Pt takes one half tab of the 200 mcg daily        tamsulosin 0.4 MG capsule     FLOMAX    90 capsule    TAKE 1 CAPSULE BY MOUTH DAILY    Malignant neoplasm of prostate (H)       VITAMIN C PO      Take 1,000 mg by mouth daily        VITAMIN D3 PO      Take 1,000 Units by mouth daily        * Notice:  This list has 2 medication(s) that are the same as other medications prescribed for you. Read the directions carefully, and ask your doctor or other care provider to review them with you.

## 2017-06-28 NOTE — PROGRESS NOTES
Infusion Nursing Note:  Roc Parkinson presents today for Velcade Cycle 7, Day 22; possible transfusion.    Patient seen by provider today: Yes: Dr. Quinn   present during visit today: Not Applicable.    Note: na.    Intravenous Access:  Lab draw site right hand, Needle type bf, Gauge 23.  Labs drawn without difficulty.    Treatment Conditions:  Lab Results   Component Value Date    HGB 9.7 06/28/2017     Lab Results   Component Value Date    WBC 3.4 06/28/2017      Lab Results   Component Value Date    ANEU 3.0 06/28/2017     Lab Results   Component Value Date    PLT 78 06/28/2017      Lab Results   Component Value Date     06/21/2017                   Lab Results   Component Value Date    POTASSIUM 4.8 06/21/2017           No results found for: MAG         Lab Results   Component Value Date    CR 1.12 06/21/2017                   Lab Results   Component Value Date    MICHELLE 8.9 06/21/2017                Lab Results   Component Value Date    BILITOTAL 0.5 06/21/2017           Lab Results   Component Value Date    ALBUMIN 2.9 06/21/2017                    Lab Results   Component Value Date    ALT 15 06/21/2017           Lab Results   Component Value Date    AST 12 06/21/2017     See clinic encounter - velcade discontinued.      Post Infusion Assessment:  Site patent and intact, free from redness, edema or discomfort.    Discharge Plan:   Discharge instructions reviewed with: Patient.  Patient and/or family verbalized understanding of discharge instructions and all questions answered.  Copy of AVS reviewed with patient and/or family.  Patient will return 7/5/17 for next appointment.  Patient discharged in stable condition accompanied by: self.  Departure Mode: Ambulatory.    KENDALL Jacob RN (discharge)

## 2017-06-28 NOTE — PROGRESS NOTES
[facility] PHYSICIANS  HEMATOLOGY ONCOLOGY     DIAGNOSIS:    1- Kappa light chain IgM multiple myeloma in a 89-year-old patient.  Bone marrow biopsy on 11/17/2016 showed hypercellular bone marrow with 65% cellularity of light chain restricted plasma cells.  FISH or G-banding could not be performed due to insufficient sample.   There is a background of myelodysplastic syndrome.  The patient was initially seen in the hospital on 11/17/2016 for macrocytic anemia and pancytopenia and transfusion requirement and a bone marrow biopsy was performed.   2- History of prostate cancer s/p EBRT s/p brachytherapy in 2003 (recent PSA 0.10), superficial bladder cancer ,no recurrences since 1986     TREATMENT: 12/15/16 velcade/dex. 4/12/17 added cyclophosphamide 300 mg weekly.      SUBJECTIVE:  The patient was seen as a followup today. He has developed pain in his right hand. His hand is quite sensitive to cold temperatures. He i staking tylenol for this.     REVIEW OF SYSTEMS:  A complete review of systems was performed and found to be negative other than pertinent positives mentioned in history of present illness.     Past medical, social histories reviewed.    Meds- Reviewed.     PHYSICAL EXAMINATION:   VITAL SIGNS:/72  Pulse 61  Temp 97.7  F (36.5  C) (Oral)  Resp 19  Wt 88.1 kg (194 lb 3.2 oz)  SpO2 94%  BMI 27.86 kg/m2  GENERAL: Sitting comfortably.   HEENT: Pupils are equal. Oropharynx is clear.   NECK: No cervical or supraclavicular lymphadenopathy.   LUNGS: Clear bilaterally.   HEART: S1, S2, regular.   ABDOMEN: Soft, nontender, nondistended, no hepatosplenomegaly.   EXTREMITIES: Warm, well perfused.   NEUROLOGIC: Alert, awake.   SKIN: No rash.   LYMPHATICS: Trace ankle edema.      LABORATORY DATA:   Recent Labs   Lab Test  06/21/17   0915  06/14/17   0925  06/07/17   0915   NA  136   --   137   POTASSIUM  4.8   --   5.2   CHLORIDE  106   --   107   CO2  20   --   23   ANIONGAP  10   --   7   BUN   33*   --   31*   CR  1.12  1.35*  0.96   GLC  306*   --   241*   MICHELLE  8.9  9.7  9.2     Recent Labs   Lab Test  06/28/17   0920  06/21/17   0915  06/14/17   0925   WBC  3.4*  4.5  2.8*   HGB  9.7*  9.5*  9.5*   PLT  78*  80*  68*   MCV  109*  107*  105*   NEUTROPHIL  88.2  89.1  84.0     Recent Labs   Lab Test  06/21/17   0915  06/14/17   0925  06/07/17   0915  05/31/17   1010   11/17/16   0938   BILITOTAL  0.5   --   0.5  0.4   < >   --    ALKPHOS  81   --   82  80   < >   --    ALT  15   --   16  18   < >   --    AST  12   --   14  12   < >   --    ALBUMIN  2.9*  3.1*  3.1*  3.0*   < >   --    LDH   --    --    --    --    --   110    < > = values in this interval not displayed.   Results for JACOB MEDELLIN (MRN [id number]) as of 6/28/2017 10:00   Ref. Range 3/15/2017 09:30 5/10/2017 13:30 6/7/2017 10:25   Gamma Fraction Latest Ref Range: 0.7 - 1.6 g/dL 3.9 (H) 3.3 (H) 2.8 (H)   IGA Latest Ref Range: 70 - 380 mg/dL 11 (L)  18 (L)   IGG Latest Ref Range: 695 - 1620 mg/dL 382 (L)  352 (L)   IGM Latest Ref Range: 60 - 265 mg/dL 6160 (H)  3900 (H)   Immunofixation ELP Unknown (Note)...  (Note)...   Kappa Free Lt Chain Latest Ref Range: 0.33 - 1.94 mg/dL 215.75 (H) 98.75 (H) 91.25 (H)   Kappa Lambda Ratio Latest Ref Range: 0.26 - 1.65  342.46 (H) 182.87 (H) 138.26 (H)   Lambda Free Lt Chain Latest Ref Range: 0.57 - 2.63 mg/dL 0.63 0.54 (L) 0.66   Monoclonal Peak Latest Ref Range: 0.0 g/dL 3.4 (H) 3.0 (H) 2.5 (H)     ECOG PS: 1    ASSESSMENT:  This is a 89-year-old gentleman who has kappa light chain multiple myeloma with hypercellular marrow with 65% of cells are light chain restricted plasma cells.  He had severe pancytopenia and has needed a transfusion in the hospital.  He did not have hypercalcemia and his renal function was normal. He has a background of myelodysplastic syndrome on his bone marrow biopsy; however, majority of the cell population was monoclonal plasma cell population.   He was started on  treatment due to cytopenia.   In 4/2017 his light chain levels were getting worse. M spike was stable. We added cyclophosphamide to the regimen.  - He is on Aranesp 300 mcg SQ every three weeks for transfusion dependence.   - He appears to have a good response to treatment overall.   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 I think he can stop Velcade and continue cyclophosphamide and dexamethasone. He will also start neurontin.      PLAN:   1.  Discontinue Velcade  2.  Start next cycle of cyclophosphamide, dexamethasone 7/5/17.   2.  Continue Zometa  4. Continue Aranesp 300 mcg SQ every three weeks  5- RTC MD 3 weeks  6- Continue acyclovir  7- Start neurontin 200 mg every night  ALISON JON MD    6/28/2017

## 2017-06-28 NOTE — MR AVS SNAPSHOT
After Visit Summary   6/28/2017    Roc Parkinson    MRN: [id number]           Patient Information     Date Of Birth          7/7/1926        Visit Information        Provider Department      6/28/2017 10:15 AM Gretel Quinn MD [facility]        Today's Diagnoses     Neuropathy (H)    -  1      Care Instructions    1.  Discontinue Velcade  2.  Start next cycle of cyclophosphamide, dexamethasone 7/5/17.   2.  Continue Zometa  4. Continue Aranesp 300 mcg SQ every three weeks  5- RTC MD 3 weeks  6- Continue acyclovir  7- Start neurontin 200 mg every night          Follow-ups after your visit        Your next 10 appointments already scheduled     Jun 30, 2017 10:30 AM CDT   Diabetic Education with  DIABETIC ED RESOURCE   [facility] ([facility])    [address] [address]   [phone]            Jul 05, 2017  8:30 AM CDT   Level 5 with SH INFUSION CHAIR 6   [facility] ([facility])    [facility] [place] [address]  [facility] [address]   [phone]            Jul 06, 2017  8:15 AM CDT   Treatment with Jose Young, PT   [facility] ([facility])    [address] [place] [address]  [address]  [facility] [address]   [phone]            Jul 11, 2017  8:15 AM CDT   Treatment with Rosalva Martinez, PT   [facility] ([facility])    [address] [place] [address]  [address]  [facility] [address]   [phone]            Jul 12, 2017  2:00 PM CDT   Level 1 with SH INFUSION CHAIR 3   [facility] ([facility])    [facility]  [address]  [facility] [address]   [phone]            Jul 13, 2017  9:30 AM CDT   Cystoscopy with Radha Jackson MD, [facility]  ([facility])    [address]  [address]  [place] [address]   [phone]            Jul 18, 2017  8:15 AM CDT   Treatment with Rosalva Martinez PT   [facility] ([facility])    [address]  [address]  [place] [address]   [phone]            Jul 19, 2017 11:15 AM CDT   Return Visit with Gretel Quinn MD   [facility] ([facility])    [facility]  [address]  [place] [address]   [phone]            Jul 26, 2017 10:00 AM CDT   Level 3 with  INFUSION CHAIR 9   [facility] ([facility])    [facility]  [address]  [place] [address] [phone]              Who to contact     If you have questions or need follow up information about today's clinic visit or your schedule please contact [facility] directly at [phone].  Normal or non-critical lab and imaging results will be communicated to you by MyChart, letter or phone within 4 business days after the clinic has received the results. If you do not hear from us within 7 days, please contact the clinic through MyChart or phone. If you have a critical or abnormal lab result, we will notify you by phone as soon as possible.  Submit refill requests through [employer] or call your pharmacy and they will forward the refill request to us. Please allow 3 business days for your refill to be completed.          Additional Information About Your Visit        Care EveryWhere ID     This is your Care EveryWhere ID. This could be used by other organizations to access your [place] medical records  [id number]        Your Vitals Were     Pulse Temperature Respirations Pulse Oximetry BMI (Body Mass Index)       61 97.7  F (36.5  C) (Oral) 19 94% 27.86 kg/m2        Blood Pressure from Last 3 Encounters:   06/28/17 150/72   06/28/17 150/72    06/27/17 128/60    Weight from Last 3 Encounters:   06/28/17 88.1 kg (194 lb 3.2 oz)   06/28/17 88.1 kg (194 lb 3.2 oz)   06/27/17 86.2 kg (190 lb)              Today, you had the following     No orders found for display         Today's Medication Changes          These changes are accurate as of: 6/28/17 11:20 AM.  If you have any questions, ask your nurse or doctor.               Start taking these medicines.        Dose/Directions    gabapentin 100 MG capsule   Commonly known as:  NEURONTIN   Used for:  Neuropathy (H)   Started by:  Gretel Quinn MD        Dose:  200 mg   Take 2 capsules (200 mg) by mouth At Bedtime   Quantity:  90 capsule   Refills:  3            Where to get your medicines      These medications were sent to [facility] [place] - [place], [place] - [address]  [address], [place] [address]     Phone:  [phone]     gabapentin 100 MG capsule                Primary Care Provider Office Phone # Fax #    Dickson Reich [phone]       [facility] [address]  [facility] [address]        Equal Access to Services     ELIE GAGNON : Hadjuliette griffino Soaimee, waaxda luqadaha, qaybta kaalmada adeegyada, melita rodriguez. So [facility] [phone].    ATENCIÓN: Si habla español, tiene a nolan disposición servicios gratuitos de asistencia lingüística. Llame al [phone].    We comply with applicable federal civil rights laws and [place] laws. We do not discriminate on the basis of race, color, national origin, age, disability sex, sexual orientation or gender identity.            Thank you!     Thank you for choosing [facility]  for your care. Our goal is always to provide you with excellent care. Hearing back from our patients is one way we can continue to improve our services. Please take a few minutes to complete the written survey that you may receive in the mail after your visit with us.  Thank you!             Your Updated Medication List - Protect others around you: Learn how to safely use, store and throw away your medicines at www.disposemymeds.org.          This list is accurate as of: 6/28/17 11:20 AM.  Always use your most recent med list.                   Brand Name Dispense Instructions for use Diagnosis    acyclovir 400 MG tablet    ZOVIRAX    60 tablet    Take 1 tablet (400 mg) by mouth 2 times daily Viral Prophylaxis.    Multiple myeloma not having achieved remission (H)       amLODIPine 5 MG tablet    NORVASC    30 tablet    TAKE 1 TABLET BY MOUTH DAILY    Essential hypertension, benign       * B-D U/F 31G X 8 MM   Generic drug:  insulin pen needle           * insulin pen needle 31G X 8 MM    B-D U/F    100 each    Use 5 pen needles daily or as directed.    Type 2 diabetes mellitus with stage 3 chronic kidney disease, with long-term current use of insulin (H)       cyclophosphamide 50 MG capsule CHEMO    CYTOXAN    24 capsule    Take 6 capsules (300 mg) by mouth once a week    Multiple myeloma not having achieved remission (H)       dexamethasone 4 MG tablet    DECADRON    40 tablet    Take 10 tablets (40 mg) by mouth every 7 days for 4 doses Take daily on Days 1, 8, 15, and 22.    Multiple myeloma not having achieved remission (H)       ferrous sulfate 325 (65 FE) MG tablet    IRON     Take 325 mg by mouth daily (with breakfast)        FREESTYLE LITE test strip   Generic drug:  blood glucose monitoring     300 strip    USE TO TEST FOUR TIMES DAILY    Uncontrolled type 1 diabetes mellitus with stage 3 chronic kidney disease (H)       furosemide 20 MG tablet    LASIX    30 tablet    Take 1 tablet (20 mg) by mouth daily    Multiple myeloma not having achieved remission (H)       gabapentin 100 MG capsule    NEURONTIN    90 capsule    Take 2 capsules (200 mg) by mouth At Bedtime    Neuropathy (H)       gemfibrozil 600 MG tablet    LOPID    180 tablet    TAKE 1 TABLET BY MOUTH TWICE DAILY     Hyperlipidemia       glipiZIDE 10 MG tablet    GLUCOTROL    180 tablet    TAKE 1 TABLET BY MOUTH TWICE DAILY BEFORE A MEAL    Type 2 diabetes mellitus with stage 3 chronic kidney disease, with long-term current use of insulin (H)       insulin aspart 100 UNIT/ML injection    NovoLOG PEN    12 mL    Inject 1-6 Units Subcutaneous 4 times daily (with meals and nightly) Before meal correction: 140-225 - take 1 unit  226-310 - take 2 units 311-395 - take 3 units 396-480 - take 4 units >481 - take 5 units Bedtime correction of: 200-299 give 1 unit 300-399 take 2 units 400 or greater take 3 units    Uncontrolled type 2 diabetes mellitus with hyperglycemia, unspecified long term insulin use status (H)       insulin detemir 100 UNIT/ML injection    LEVEMIR FLEXPEN/FLEXTOUCH    15 mL    Inject 12 Units Subcutaneous every morning (before breakfast)    Type 2 diabetes mellitus with diabetic chronic kidney disease, unspecified CKD stage, unspecified long term insulin use status (H)       levothyroxine 25 MCG tablet    SYNTHROID/LEVOTHROID    90 tablet    TAKE 1 TABLET BY MOUTH EVERY DAY.    Hypothyroidism       lisinopril 30 MG tablet    PRINIVIL,ZESTRIL    90 tablet    TAKE 1 TABLET BY MOUTH EVERY DAY    Essential hypertension, benign       metoprolol 25 MG tablet    LOPRESSOR    180 tablet    TAKE 1 TABLET BY MOUTH TWICE DAILY    Essential hypertension, hypertension with unspecified goal       omeprazole 20 MG CR capsule    priLOSEC    90 capsule    TAKE 1 CAPSULE BY MOUTH EVERY DAY    Congenital hiatus hernia       ondansetron 8 MG tablet    ZOFRAN    10 tablet    Take 1 tablet (8 mg) by mouth every 8 hours as needed (Nausea/Vomiting)    Multiple myeloma not having achieved remission (H)       polyethylene glycol powder    MIRALAX/GLYCOLAX          prochlorperazine 5 MG tablet    COMPAZINE    30 tablet    Take 1 tablet (5 mg) by mouth every 6 hours as needed (Nausea/Vomiting)    Multiple myeloma not having achieved  remission (H)       Selenium 200 MCG Tabs      Take 100 mcg by mouth daily. Pt takes one half tab of the 200 mcg daily        tamsulosin 0.4 MG capsule    FLOMAX    90 capsule    TAKE 1 CAPSULE BY MOUTH DAILY    Malignant neoplasm of prostate (H)       VITAMIN C PO      Take 1,000 mg by mouth daily        VITAMIN D3 PO      Take 1,000 Units by mouth daily        * Notice:  This list has 2 medication(s) that are the same as other medications prescribed for you. Read the directions carefully, and ask your doctor or other care provider to review them with you.

## 2017-06-29 NOTE — PATIENT INSTRUCTIONS
Oral Chemotherapy Monitoring Program.    Patient currently on Cytoxan therapy.    Reviewed lab results from 6/28/17.    Labs meet parameters to continue treatment.     Will follow up in 1 week.    Alvarez May PharmD  Oral Chemotherapy Program

## 2017-06-30 NOTE — PATIENT INSTRUCTIONS
My Diabetes Care Goals:  Let's adjust your sliding scale for Wed and Thurs following your chemo to give you more insulin. Please follow this for those two days:    Inject 3-7 Units Subcutaneous 4 times daily (with meals and nightly) Before meal correction:   140-225 - take 3 unit    226-310 - take 4 units   311-395 - take 5 units   396-480 - take 6 units   >481 - take 7 units  Bedtime correction of:   200-299 give 2 unit   300-399 take 3 units   400 or greater take 4 units      On other days of the week, continue your current sliding scale (less insulin):    Inject 1-6 Units Subcutaneous 4 times daily (with meals and nightly) Before meal correction:   140-225 - take 2 unit    226-310 - take 3 units   311-395 - take 4 units   396-480 - take 5 units   >481 - take 6 units  Bedtime correction of:   200-299 give 1 unit   300-399 take 2 units   400 or greater take 3 units    Please keep track of blood sugars and insulin doses prior to next visit.     Follow up:  Follow-up diabetes education appointment scheduled on Friday, August 4th at 10:30 am.      Bring blood glucose meter and logbook with you to all doctor and follow-up appointments.     [facility] for the [facility]:  For Your Diabetes Education and Nutrition Appointments Call:  [phone]   For Diabetes Education or Nutrition Related Questions:   Phone: [phone]  E-mail: [email or web address]  Fax: [phone]   If you need a medication refill please contact your pharmacy. Please allow 3 business days for your refills to be completed.    Instructions for emailing the Diabetes Educators    If you need to communicate a non-urgent message to a Diabetes Educator via email, please send to [email or web address].    Please follow the following email guidelines:    Subject line: Secure: your clinic name (example: Secure: [email or web address])  In the email please include: First name, middle initial, last name and date of  birth.    We will be in touch with you within one (1) business day.

## 2017-06-30 NOTE — MR AVS SNAPSHOT
After Visit Summary   6/30/2017    Roc Parkinson    MRN: [id number]           Patient Information     Date Of Birth          7/7/1926        Visit Information        Provider Department      6/30/2017 10:30 AM  DIABETIC ED RESOURCE [facility] Instructions    My Diabetes Care Goals:  Let's adjust your sliding scale for Wed and Thurs following your chemo to give you more insulin. Please follow this for those two days:    Inject 3-7 Units Subcutaneous 4 times daily (with meals and nightly) Before meal correction:   140-225 - take 3 unit    226-310 - take 4 units   311-395 - take 5 units   396-480 - take 6 units   >481 - take 7 units  Bedtime correction of:   200-299 give 1 unit   300-399 take 2 units   400 or greater take 3 units      On other days of the week, continue your current sliding scale (less insulin):    Inject 1-6 Units Subcutaneous 4 times daily (with meals and nightly) Before meal correction:   140-225 - take 1 unit    226-310 - take 2 units   311-395 - take 3 units   396-480 - take 4 units   >481 - take 5 units  Bedtime correction of:   200-299 give 1 unit   300-399 take 2 units   400 or greater take 3 units    Follow up:  Follow-up diabetes education appointment scheduled on Friday, August 4th at 10:30 am.      Bring blood glucose meter and logbook with you to all doctor and follow-up appointments.     [facility] for the [facility]:  For Your Diabetes Education and Nutrition Appointments Call:  [phone]   For Diabetes Education or Nutrition Related Questions:   Phone: [phone]  E-mail: [email or web address]  Fax: [phone]   If you need a medication refill please contact your pharmacy. Please allow 3 business days for your refills to be completed.    Instructions for emailing the Diabetes Educators    If you need to communicate a non-urgent message to a Diabetes Educator via email,  please send to [email or web address].    Please follow the following email guidelines:    Subject line: Secure: your clinic name (example: Secure: [facility])  In the email please include: First name, middle initial, last name and date of birth.    We will be in touch with you within one (1) business day.             Follow-ups after your visit        Your next 10 appointments already scheduled     Jul 05, 2017  8:30 AM CDT   Level 5 with  INFUSION CHAIR 6   [facility] ([facility])    [facility] [place] [id number]  [facility] [address]   [phone]            Jul 06, 2017  8:15 AM CDT   Treatment with Jose Young, PT   [facility] ([facility])    [address]  [address]  [facility] [address]   [phone]            Jul 11, 2017  8:15 AM CDT   Treatment with Rosalva Martinez, PT   [facility] ([facility])    [address]  [address]  [facility] [address]   [phone]            Jul 12, 2017  2:00 PM CDT   Level 1 with  INFUSION CHAIR 3   [facility] ([facility])    [facility]  [address]  [facility] [address]   [phone]            Jul 13, 2017  9:30 AM CDT   Cystoscopy with Radha Jackson MD, [facility] ([facility])    [address]  [address]  [facility] [address]   [phone]            Jul 18, 2017  8:15 AM CDT   Treatment with Rosalva Martinez, PT   [facility] ([facility])    [address] [place] [address]  [address]  [facility] [address]   [phone]            Jul 19, 2017 11:15 AM CDT   Return Visit with Gretel Quinn MD   [facility] ([facility])    [facility] [place] [id number]  [place] [address]   [phone]            Jul 26, 2017 10:00 AM CDT   Level 3 with  INFUSION CHAIR 9   [facility] ([facility])    [facility] [place]  [address]  [place] [address]   [phone]            Aug 04, 2017 10:30 AM CDT   Diabetic Education with  [facility]    [address] [address] [phone]              Who to contact     If you have questions or need follow up information about today's clinic visit or your schedule please contact [facility] directly at [phone].  Normal or non-critical lab and imaging results will be communicated to you by MyChart, letter or phone within 4 business days after the clinic has received the results. If you do not hear from us within 7 days, please contact the clinic through MyChart or phone. If you have a critical or abnormal lab result, we will notify you by phone as soon as possible.  Submit refill requests through [employer] or call your pharmacy and they will forward the refill request to us. Please allow 3 business days for your refill to be completed.          Additional Information About Your Visit        Care EveryWhere ID     This is your Care EveryWhere ID. This could be used by other organizations to access your [place] medical records  [id number]         Blood Pressure from Last 3 Encounters:   06/28/17 150/72   06/28/17 150/72   06/27/17 128/60    Weight from Last 3 Encounters:   06/28/17 88.1 kg (194 lb 3.2 oz)   06/28/17 88.1 kg (194 lb 3.2 oz)   06/27/17 86.2 kg (190 lb)              Today, you had the following     No orders found for display       Primary Care Provider Office Phone # Fax #    Dickson Reich [phone]       [facility] [address]  [facility] [address]         Equal Access to Services     [facility] : Hadii aad ku hademelyvincenzo Shivaniaimee, waemoryda luqadaha, qaybta kaalmahanh thomason, melita rodriguez. So [facility] [phone].    ATENCIÓN: Si habla español, tiene a nolan disposición servicios gratuitos de asistencia lingüística. Willyame al [phone].    We comply with applicable federal civil rights laws and [place] laws. We do not discriminate on the basis of race, color, national origin, age, disability sex, sexual orientation or gender identity.            Thank you!     Thank you for choosing [facility]  for your care. Our goal is always to provide you with excellent care. Hearing back from our patients is one way we can continue to improve our services. Please take a few minutes to complete the written survey that you may receive in the mail after your visit with us. Thank you!             Your Updated Medication List - Protect others around you: Learn how to safely use, store and throw away your medicines at www.disposemymeds.org.          This list is accurate as of: 6/30/17 10:51 AM.  Always use your most recent med list.                   Brand Name Dispense Instructions for use Diagnosis    acyclovir 400 MG tablet    ZOVIRAX    60 tablet    Take 1 tablet (400 mg) by mouth 2 times daily Viral Prophylaxis.    Multiple myeloma not having achieved remission (H)       amLODIPine 5 MG tablet    NORVASC    30 tablet    TAKE 1 TABLET BY MOUTH DAILY    Essential hypertension, benign       * B-D U/F 31G X 8 MM   Generic drug:  insulin pen needle           * insulin pen needle 31G X 8 MM    B-D U/F    100 each    Use 5 pen needles daily or as directed.    Type 2 diabetes mellitus with stage 3 chronic kidney disease, with long-term current use of insulin (H)       cyclophosphamide 50 MG capsule CHEMO    CYTOXAN    24 capsule    Take 6 capsules (300 mg) by mouth once a week    Multiple myeloma not having achieved remission (H)        dexamethasone 4 MG tablet    DECADRON    40 tablet    Take 10 tablets (40 mg) by mouth every 7 days for 4 doses Take daily on Days 1, 8, 15, and 22.    Multiple myeloma not having achieved remission (H)       ferrous sulfate 325 (65 FE) MG tablet    IRON     Take 325 mg by mouth daily (with breakfast)        FREESTYLE LITE test strip   Generic drug:  blood glucose monitoring     300 strip    USE TO TEST FOUR TIMES DAILY    Uncontrolled type 1 diabetes mellitus with stage 3 chronic kidney disease (H)       furosemide 20 MG tablet    LASIX    30 tablet    Take 1 tablet (20 mg) by mouth daily    Multiple myeloma not having achieved remission (H)       gabapentin 100 MG capsule    NEURONTIN    90 capsule    Take 2 capsules (200 mg) by mouth At Bedtime    Neuropathy (H)       gemfibrozil 600 MG tablet    LOPID    180 tablet    TAKE 1 TABLET BY MOUTH TWICE DAILY    Hyperlipidemia       glipiZIDE 10 MG tablet    GLUCOTROL    180 tablet    TAKE 1 TABLET BY MOUTH TWICE DAILY BEFORE A MEAL    Type 2 diabetes mellitus with stage 3 chronic kidney disease, with long-term current use of insulin (H)       insulin aspart 100 UNIT/ML injection    NovoLOG PEN    12 mL    Inject 1-6 Units Subcutaneous 4 times daily (with meals and nightly) Before meal correction: 140-225 - take 1 unit  226-310 - take 2 units 311-395 - take 3 units 396-480 - take 4 units >481 - take 5 units Bedtime correction of: 200-299 give 1 unit 300-399 take 2 units 400 or greater take 3 units    Uncontrolled type 2 diabetes mellitus with hyperglycemia, unspecified long term insulin use status (H)       insulin detemir 100 UNIT/ML injection    LEVEMIR FLEXPEN/FLEXTOUCH    15 mL    Inject 12 Units Subcutaneous every morning (before breakfast)    Type 2 diabetes mellitus with diabetic chronic kidney disease, unspecified CKD stage, unspecified long term insulin use status (H)       levothyroxine 25 MCG tablet    SYNTHROID/LEVOTHROID    90 tablet    TAKE 1 TABLET BY  MOUTH EVERY DAY.    Hypothyroidism       lisinopril 30 MG tablet    PRINIVIL,ZESTRIL    90 tablet    TAKE 1 TABLET BY MOUTH EVERY DAY    Essential hypertension, benign       metoprolol 25 MG tablet    LOPRESSOR    180 tablet    TAKE 1 TABLET BY MOUTH TWICE DAILY    Essential hypertension, hypertension with unspecified goal       omeprazole 20 MG CR capsule    priLOSEC    90 capsule    TAKE 1 CAPSULE BY MOUTH EVERY DAY    Congenital hiatus hernia       ondansetron 8 MG tablet    ZOFRAN    10 tablet    Take 1 tablet (8 mg) by mouth every 8 hours as needed (Nausea/Vomiting)    Multiple myeloma not having achieved remission (H)       polyethylene glycol powder    MIRALAX/GLYCOLAX          prochlorperazine 5 MG tablet    COMPAZINE    30 tablet    Take 1 tablet (5 mg) by mouth every 6 hours as needed (Nausea/Vomiting)    Multiple myeloma not having achieved remission (H)       Selenium 200 MCG Tabs      Take 100 mcg by mouth daily. Pt takes one half tab of the 200 mcg daily        tamsulosin 0.4 MG capsule    FLOMAX    90 capsule    TAKE 1 CAPSULE BY MOUTH DAILY    Malignant neoplasm of prostate (H)       VITAMIN C PO      Take 1,000 mg by mouth daily        VITAMIN D3 PO      Take 1,000 Units by mouth daily        * Notice:  This list has 2 medication(s) that are the same as other medications prescribed for you. Read the directions carefully, and ask your doctor or other care provider to review them with you.

## 2017-06-30 NOTE — PROGRESS NOTES
"Diabetes Self Management Training: Follow-up Visit    Roc Parkinson presents today for education and evaluation of glucose control related to Type 2 diabetes.    He is accompanied by self    Patient's diabetes management related comments/concerns: reports that his blood sugars are much better    Patient would like this visit to be focused around the following diabetes-related behaviors and goals: Taking Medication    ASSESSMENT:  Patient Problem List reviewed for relevant medical history and current medical status.    Current Diabetes Management per Patient:       Diabetes Medication(s)     Insulin Sig    insulin detemir (LEVEMIR FLEXPEN/FLEXTOUCH) 100 UNIT/ML injection Inject 12 Units Subcutaneous every morning (before breakfast)    insulin aspart (NOVOLOG PEN) 100 UNIT/ML injection Inject 1-6 Units Subcutaneous 4 times daily (with meals and nightly) Before meal correction:   140-225 - take 1 unit    226-310 - take 2 units   311-395 - take 3 units   396-480 - take 4 units   >481 - take 5 units  Bedtime correction of:   200-299 give 1 unit   300-399 take 2 units   400 or greater take 3 units    Sulfonylureas Sig    glipiZIDE (GLUCOTROL) 10 MG tablet TAKE 1 TABLET BY MOUTH TWICE DAILY BEFORE A MEAL        Has been increasing the sliding scale by 1 unit or more due to significantly elevated numbers after chemo (wed-thurs).    BG results:          BG values are: variable  Patient's most recent   Lab Results   Component Value Date    A1C 8.0 05/23/2017    is not meeting goal of <8.0    Nutrition:  Not discussed    Physical Activity:    Not discussed    Diabetes Complications:  Not discussed today.    Vitals:  There were no vitals taken for this visit.  Estimated body mass index is 27.86 kg/(m^2) as calculated from the following:    Height as of 6/28/17: 1.778 m (5' 10\").    Weight as of 6/28/17: 88.1 kg (194 lb 3.2 oz).   Last 3 BP:   BP Readings from Last 3 Encounters:   06/28/17 150/72   06/28/17 150/72   06/27/17 " 128/60       History   Smoking Status     Never Smoker   Smokeless Tobacco     Never Used       Labs:  Lab Results   Component Value Date    A1C 8.0 05/23/2017     Lab Results   Component Value Date     06/21/2017     Lab Results   Component Value Date    LDL 64 05/23/2017     HDL Cholesterol   Date Value Ref Range Status   05/23/2017 35 (L) >39 mg/dL Final   ]  GFR Estimate   Date Value Ref Range Status   06/21/2017 61 >60 mL/min/1.7m2 Final     Comment:     Non  GFR Calc     GFR Estimate If Black   Date Value Ref Range Status   06/21/2017 74 >60 mL/min/1.7m2 Final     Comment:      GFR Calc     Lab Results   Component Value Date    CR 1.12 06/21/2017     No results found for: MICROALBUMIN    Health Beliefs and Attitudes:   Patient Activation Measure Survey Score:  FANNY Score (Last Two) 10/14/2015 9/28/2016   FANNY Raw Score 40 29   Activation Score 60 52.9   FANNY Level 3 2       Stage of Change: ACTION (Actively working towards change)    Diabetes knowledge and skills assessment:     Patient is knowledgeable in diabetes management concepts related to: Healthy Eating, Being Active, Monitoring, Reducing Risks and Healthy Coping    Patient needs further education on the following diabetes management concepts: Problem solving and Taking Medication    Barriers to Learning Assessment: No Barriers identified    Based on learning assessment above, most appropriate setting for further diabetes education would be: Individual setting.    INTERVENTION:  Reviewed BG pattern and insulin dosing. Patient doing well with sliding scale, and has been adapting it somewhat to give more correction. Will adjust SSI as indicated.     Education provided today on:  AADE Self-Care Behaviors:  Taking Medication: action of prescribed medication and dosing  Problem Solving: high blood glucose - causes, signs/symptoms, treatment and prevention and low blood glucose - causes, signs/symptoms, treatment and  prevention    Opportunities for ongoing education and support in diabetes-self management were discussed.    Pt verbalized understanding of concepts discussed and recommendations provided today.       Education Materials Provided:  No new materials provided today    PLAN:  See Patient Instructions for co-developed, patient-stated behavior change goals.  Increase sliding scale by 2 units on Wed-Thurs following chemo, increase sliding scale by 1 units on other days.   Continue to monitor blood sugars 4x/day, keep a record along with insulin doses prior to next visit.  AVS printed and provided to patient today.    FOLLOW-UP:  Follow-up appointment scheduled on August 4th.  Chart routed to referring provider.    Ongoing plan for education and support: Follow-up visit with diabetes educator  and Follow-up with primary care provider    Juana Stout RD, MARIAME  Diabetes     Time Spent: 30 minutes  Encounter Type: Individual    Any diabetes medication dose changes were made via the CDE Protocol and Collaborative Practice Agreement with the patient's primary care provider. A copy of this encounter was shared with the provider.

## 2017-07-05 NOTE — PROGRESS NOTES
Infusion Nursing Note:  Roc Parkinson presents today for labs, possible aranesp and possible transfusion.    Patient seen by provider today: No   present during visit today: Not Applicable.    Note: Aranesp given for HBG of 8.9    Intravenous Access:  Lab draw site Right Hand, Needle type Butterfly, Gauge #23.  Labs drawn without difficulty.    Treatment Conditions:  Lab Results   Component Value Date    HGB 8.9 07/05/2017     Lab Results   Component Value Date    WBC 3.4 07/05/2017      Lab Results   Component Value Date    ANEU 2.9 07/05/2017     Lab Results   Component Value Date     07/05/2017      Lab Results   Component Value Date     07/05/2017                   Lab Results   Component Value Date    POTASSIUM 4.6 07/05/2017           No results found for: MAG         Lab Results   Component Value Date    CR 1.22 07/05/2017                   Lab Results   Component Value Date    MICHELLE 9.4 07/05/2017                Lab Results   Component Value Date    BILITOTAL 0.4 07/05/2017           Lab Results   Component Value Date    ALBUMIN 2.9 07/05/2017                    Lab Results   Component Value Date    ALT 14 07/05/2017           Lab Results   Component Value Date    AST 11 07/05/2017         Post Infusion Assessment:  Patient tolerated injection without incident.  Site patent and intact, free from redness, edema or discomfort.  No evidence of extravasations.  Access discontinued per protocol.    Discharge Plan:   Discharge instructions reviewed with: Patient.  Patient and/or family verbalized understanding of discharge instructions and all questions answered.  Copy of AVS reviewed with patient and/or family.  Patient will return 7/12/2017 for next appointment.  Patient discharged in stable condition accompanied by: self.  Departure Mode: Ambulatory.    Dora Mcgrath RN

## 2017-07-05 NOTE — PROGRESS NOTES
Oral Chemotherapy Monitoring Program.    Patient currently on cytoxan therapy.    Reviewed labs from 7/5/17    No concerning abnormalities.    Will follow up in 4 weeks with repeat labs and check in.    Maria Eugenia Cortes PharmD  July 5, 2017

## 2017-07-05 NOTE — MR AVS SNAPSHOT
After Visit Summary   7/5/2017    Roc Parkinson    MRN: [id number]           Patient Information     Date Of Birth          7/7/1926        Visit Information        Provider Department      7/5/2017 8:30 AM  INFUSION CHAIR 6 [facility]        Today's Diagnoses     Multiple myeloma not having achieved remission (H)    -  1    Macrocytic anemia        MDS (myelodysplastic syndrome) (H)           Follow-ups after your visit        Follow-up notes from your care team     Return in 7 days (on 7/12/2017).      Your next 10 appointments already scheduled     Jul 06, 2017  8:15 AM CDT   Treatment with Jose Young, PT   [facility] ([facility])    [address]  [address]  [facility] [address]   [phone]            Jul 11, 2017  8:15 AM CDT   Treatment with Rosalva Martinez, PT   [facility] ([facility])    [address]  [address]  [facility] [address]   [phone]            Jul 12, 2017  2:00 PM CDT   Level 1 with  INFUSION CHAIR 3   [facility] ([facility])    [facility]  [address]  [facility] [address]   [phone]            Jul 13, 2017  9:30 AM CDT   Cystoscopy with Radha Jackson MD, [facility] ([facility] [place])    [address]  [address]  [facility] [address]   [phone]            Jul 18, 2017  8:15 AM CDT   Treatment with Rosalva Martinez, PT   [facility] ([facility])    [address]  [address]  [facility] [address]   [phone]            Jul 19, 2017 11:15 AM CDT   Return Visit with Gretel Quinn MD   [facility] ([facility])    [facility]  [address]  [facility] [address]   [phone]             Jul 26, 2017 10:00 AM CDT   Level 3 with  INFUSION CHAIR 9   [facility] ([facility])    [facility]  [address]  [facility] [address] [phone]            Aug 04, 2017 10:30 AM CDT   Diabetic Education with  DIABETIC ED RESOURCE   [facility]    [address] [id number] [phone]            Aug 07, 2017 10:30 AM CDT   SHORT with Dickson Reich MD   [facility] ([facility])    [address] [place] [address] [phone]              Who to contact     If you have questions or need follow up information about today's clinic visit or your schedule please contact [facility] directly at [phone].  Normal or non-critical lab and imaging results will be communicated to you by MyChart, letter or phone within 4 business days after the clinic has received the results. If you do not hear from us within 7 days, please contact the clinic through MyChart or phone. If you have a critical or abnormal lab result, we will notify you by phone as soon as possible.  Submit refill requests through [facility] or call your pharmacy and they will forward the refill request to us. Please allow 3 business days for your refill to be completed.          Additional Information About Your Visit        Care EveryWhere ID     This is your Care EveryWhere ID. This could be used by other organizations to access your [place] medical records  [id number]        Your Vitals Were     Pulse Temperature Respirations             65 98.8  F (37.1  C) (Oral) 14          Blood Pressure from Last 3 Encounters:   07/05/17 (!) 139/91   06/28/17 150/72   06/28/17 150/72    Weight from Last 3 Encounters:   06/28/17 88.1 kg (194 lb 3.2 oz)    06/28/17 88.1 kg (194 lb 3.2 oz)   06/27/17 86.2 kg (190 lb)              We Performed the Following     CBC with platelets differential     Comprehensive metabolic panel          Today's Medication Changes          These changes are accurate as of: 7/5/17 10:20 AM.  If you have any questions, ask your nurse or doctor.               These medicines have changed or have updated prescriptions.        Dose/Directions    * cyclophosphamide 50 MG capsule CHEMO   Commonly known as:  CYTOXAN   This may have changed:  Another medication with the same name was added. Make sure you understand how and when to take each.   Used for:  Multiple myeloma not having achieved remission (H)        Dose:  300 mg   Take 6 capsules (300 mg) by mouth once a week   Quantity:  24 capsule   Refills:  0       * cyclophosphamide 50 MG capsule CHEMO   Commonly known as:  CYTOXAN   This may have changed:  You were already taking a medication with the same name, and this prescription was added. Make sure you understand how and when to take each.   Used for:  Multiple myeloma not having achieved remission (H)        Dose:  300 mg   Take 6 capsules (300 mg) by mouth once a week   Quantity:  24 capsule   Refills:  0       * dexamethasone 4 MG tablet   Commonly known as:  DECADRON   This may have changed:  Another medication with the same name was added. Make sure you understand how and when to take each.   Used for:  Multiple myeloma not having achieved remission (H)        Dose:  40 mg   Take 10 tablets (40 mg) by mouth every 7 days for 4 doses Take daily on Days 1, 8, 15, and 22.   Quantity:  40 tablet   Refills:  0       * dexamethasone 4 MG tablet   Commonly known as:  DECADRON   This may have changed:  You were already taking a medication with the same name, and this prescription was added. Make sure you understand how and when to take each.   Used for:  Multiple myeloma not having achieved remission (H)        Dose:  40 mg   Take 10 tablets  (40 mg) by mouth every 7 days Take daily on Days 1, 8, 15, and 22.   Quantity:  40 tablet   Refills:  0       * Notice:  This list has 4 medication(s) that are the same as other medications prescribed for you. Read the directions carefully, and ask your doctor or other care provider to review them with you.         Where to get your medicines      These medications were sent to [facility] [place], [place] - [address]  [address], [place] [address]     Phone:  [phone]     cyclophosphamide 50 MG capsule CHEMO    dexamethasone 4 MG tablet                Primary Care Provider Office Phone # Fax #    Dickson Clark Reich [phone]       [facility] [address]  [facility] [address]        Equal Access to Services     ELIE GAGNON : Hadii virginia wilson hadasho Soomaali, waaxda luqadaha, qaybta kaalmada adeegyada, melita shaw . So [facility] [phone].    ATENCIÓN: Si habla español, tiene a nolan disposición servicios gratuitos de asistencia lingüística. [facility] [phone].    We comply with applicable federal civil rights laws and [place] laws. We do not discriminate on the basis of race, color, national origin, age, disability sex, sexual orientation or gender identity.            Thank you!     Thank you for choosing [facility]  for your care. Our goal is always to provide you with excellent care. Hearing back from our patients is one way we can continue to improve our services. Please take a few minutes to complete the written survey that you may receive in the mail after your visit with us. Thank you!             Your Updated Medication List - Protect others around you: Learn how to safely use, store and throw away your medicines at www.disposemymeds.org.          This list is accurate as of: 7/5/17 10:20 AM.  Always use your most recent med list.                   Brand Name Dispense  Instructions for use Diagnosis    acyclovir 400 MG tablet    ZOVIRAX    60 tablet    Take 1 tablet (400 mg) by mouth 2 times daily Viral Prophylaxis.    Multiple myeloma not having achieved remission (H)       amLODIPine 5 MG tablet    NORVASC    30 tablet    TAKE 1 TABLET BY MOUTH DAILY    Essential hypertension, benign       * B-D U/F 31G X 8 MM   Generic drug:  insulin pen needle           * insulin pen needle 31G X 8 MM    B-D U/F    100 each    Use 5 pen needles daily or as directed.    Type 2 diabetes mellitus with stage 3 chronic kidney disease, with long-term current use of insulin (H)       * cyclophosphamide 50 MG capsule CHEMO    CYTOXAN    24 capsule    Take 6 capsules (300 mg) by mouth once a week    Multiple myeloma not having achieved remission (H)       * cyclophosphamide 50 MG capsule CHEMO    CYTOXAN    24 capsule    Take 6 capsules (300 mg) by mouth once a week    Multiple myeloma not having achieved remission (H)       * dexamethasone 4 MG tablet    DECADRON    40 tablet    Take 10 tablets (40 mg) by mouth every 7 days for 4 doses Take daily on Days 1, 8, 15, and 22.    Multiple myeloma not having achieved remission (H)       * dexamethasone 4 MG tablet    DECADRON    40 tablet    Take 10 tablets (40 mg) by mouth every 7 days Take daily on Days 1, 8, 15, and 22.    Multiple myeloma not having achieved remission (H)       ferrous sulfate 325 (65 FE) MG tablet    IRON     Take 325 mg by mouth daily (with breakfast)        FREESTYLE LITE test strip   Generic drug:  blood glucose monitoring     300 strip    USE TO TEST FOUR TIMES DAILY    Uncontrolled type 1 diabetes mellitus with stage 3 chronic kidney disease (H)       furosemide 20 MG tablet    LASIX    30 tablet    Take 1 tablet (20 mg) by mouth daily    Multiple myeloma not having achieved remission (H)       gabapentin 100 MG capsule    NEURONTIN    90 capsule    Take 2 capsules (200 mg) by mouth At Bedtime    Neuropathy (H)       gemfibrozil  600 MG tablet    LOPID    180 tablet    TAKE 1 TABLET BY MOUTH TWICE DAILY    Hyperlipidemia       glipiZIDE 10 MG tablet    GLUCOTROL    180 tablet    TAKE 1 TABLET BY MOUTH TWICE DAILY BEFORE A MEAL    Type 2 diabetes mellitus with stage 3 chronic kidney disease, with long-term current use of insulin (H)       insulin aspart 100 UNIT/ML injection    NovoLOG PEN    12 mL    Inject 1-6 Units Subcutaneous 4 times daily (with meals and nightly) Before meal correction: 140-225 - take 2 unit  226-310 - take 3 units 311-395 - take 4 units 396-480 - take 5 units >481 - take 6 units Bedtime correction of: 200-299 give 1 unit 300-399 take 2 units 400 or greater take 3 units On Wed-Thurs after chemo, add an additional 1-2 units to sliding scale.    Uncontrolled type 2 diabetes mellitus with hyperglycemia, unspecified long term insulin use status (H)       insulin detemir 100 UNIT/ML injection    LEVEMIR FLEXPEN/FLEXTOUCH    15 mL    Inject 12 Units Subcutaneous every morning (before breakfast)    Type 2 diabetes mellitus with diabetic chronic kidney disease, unspecified CKD stage, unspecified long term insulin use status (H)       levothyroxine 25 MCG tablet    SYNTHROID/LEVOTHROID    90 tablet    TAKE 1 TABLET BY MOUTH EVERY DAY.    Hypothyroidism       lisinopril 30 MG tablet    PRINIVIL,ZESTRIL    90 tablet    TAKE 1 TABLET BY MOUTH EVERY DAY    Essential hypertension, benign       metoprolol 25 MG tablet    LOPRESSOR    180 tablet    TAKE 1 TABLET BY MOUTH TWICE DAILY    Essential hypertension, hypertension with unspecified goal       omeprazole 20 MG CR capsule    priLOSEC    90 capsule    TAKE 1 CAPSULE BY MOUTH EVERY DAY    Congenital hiatus hernia       ondansetron 8 MG tablet    ZOFRAN    10 tablet    Take 1 tablet (8 mg) by mouth every 8 hours as needed (Nausea/Vomiting)    Multiple myeloma not having achieved remission (H)       polyethylene glycol powder    MIRALAX/GLYCOLAX          prochlorperazine 5 MG tablet     COMPAZINE    30 tablet    Take 1 tablet (5 mg) by mouth every 6 hours as needed (Nausea/Vomiting)    Multiple myeloma not having achieved remission (H)       Selenium 200 MCG Tabs      Take 100 mcg by mouth daily. Pt takes one half tab of the 200 mcg daily        tamsulosin 0.4 MG capsule    FLOMAX    90 capsule    TAKE 1 CAPSULE BY MOUTH DAILY    Malignant neoplasm of prostate (H)       VITAMIN C PO      Take 1,000 mg by mouth daily        VITAMIN D3 PO      Take 1,000 Units by mouth daily        * Notice:  This list has 6 medication(s) that are the same as other medications prescribed for you. Read the directions carefully, and ask your doctor or other care provider to review them with you.

## 2017-07-11 NOTE — TELEPHONE ENCOUNTER
amLODIPine (NORVASC) 5 MG      Last Written Prescription Date: 3/14/17  Last Fill Quantity: 30, # refills: 3    Last Office Visit with G, P or [facility] prescribing provider:  6/27/17   Future Office Visit:    Next 5 appointments (look out 90 days)     Jul 19, 2017 11:15 AM CDT   Return Visit with Gretel Quinn MD   [facility] ([facility])    [facility]  [address]  [facility] [address]   [phone]            Aug 07, 2017 10:30 AM CDT   SHORT with Dickson Reich MD   [facility] ([facility])    [address] [place] [address]   [phone]                    BP Readings from Last 3 Encounters:   07/05/17 (!) 139/91   06/28/17 150/72   06/28/17 150/72

## 2017-07-12 NOTE — PROGRESS NOTES
Infusion Nursing Note:  Roc SILVA Tesha presents today for zometa.    Patient seen by provider today: No   present during visit today: Not Applicable.    Note: N/A.    Intravenous Access:  Peripheral IV placed.    Treatment Conditions:  Results reviewed, labs MET treatment parameters, ok to proceed with treatment. Calcium 9.4, Creatinine 1.22 on 7/5/17      Post Infusion Assessment:  Patient tolerated infusion without incident.  Site patent and intact, free from redness, edema or discomfort.  No evidence of extravasations.  Access discontinued per protocol.    Discharge Plan:   Patient and/or family verbalized understanding of discharge instructions and all questions answered.  Copy of AVS reviewed with patient and/or family.  Patient will return in 2 weeks for next appointment.  Patient discharged in stable condition accompanied by: self.  Departure Mode: Ambulatory.    Fernanda Bailey RN

## 2017-07-12 NOTE — MR AVS SNAPSHOT
After Visit Summary   7/12/2017    Roc Parkinson    MRN: [id number]           Patient Information     Date Of Birth          7/7/1926        Visit Information        Provider Department      7/12/2017 2:00 PM  INFUSION CHAIR 3 [facility]        Today's Diagnoses     Hypercalcemia    -  1    Multiple myeloma not having achieved remission (H)           Follow-ups after your visit        Your next 10 appointments already scheduled     Jul 26, 2017 10:00 AM CDT   Level 3 with  INFUSION CHAIR 9   [facility] ([facility])    [facility]  [address]  [place] [address]   [phone]            Jul 26, 2017 10:45 AM CDT   Return Visit with Gretel Quinn MD   [facility] ([facility])    [facility]  [address]  [place] [address]   [phone]            Aug 04, 2017 10:30 AM CDT   Diabetic Education with  DIABETIC ED RESOURCE   [facility] ([facility])    [address] [place] [address]   [phone]            Aug 07, 2017 10:30 AM CDT   SHORT with Dickson Reich MD   [facility]    [address] [place] [address]   [phone]            Aug 09, 2017  1:30 PM CDT   Level 1 with  INFUSION CHAIR 12   [facility] ([facility])    [facility]  [address]  [place] [address]   [phone]              Who to contact     If you have questions or need follow up information about today's clinic visit or your schedule please contact [facility] directly at [phone].  Normal or non-critical lab and imaging results will be communicated  to you by MyChart, letter or phone within 4 business days after the clinic has received the results. If you do not hear from us within 7 days, please contact the clinic through MyChart or phone. If you have a critical or abnormal lab result, we will notify you by phone as soon as possible.  Submit refill requests through [employer] or call your pharmacy and they will forward the refill request to us. Please allow 3 business days for your refill to be completed.          Additional Information About Your Visit        Care EveryWhere ID     This is your Care EveryWhere ID. This could be used by other organizations to access your [place] medical records  [id number]        Your Vitals Were     Pulse Temperature Respirations             70 98.2  F (36.8  C) (Oral) 18          Blood Pressure from Last 3 Encounters:   07/12/17 160/72   07/05/17 (!) 139/91   06/28/17 150/72    Weight from Last 3 Encounters:   06/28/17 88.1 kg (194 lb 3.2 oz)   06/28/17 88.1 kg (194 lb 3.2 oz)   06/27/17 86.2 kg (190 lb)              Today, you had the following     No orders found for display       Primary Care Provider Office Phone # Fax #    Dickson Reich [phone]       [facility] [address] [facility] [address]        Equal Access to Services     ELIE GAGNON : Hadii aad ku hadasho Soomaali, waaxda luqadaha, qaybta kaalmada adetawanada, melita rodriguez. So [facility] [place] [facility] [phone].    ATENCIÓN: Si habla español, tiene a nolan disposición servicios gratuitos de asistencia lingüística. Eric al [phone].    We comply with applicable federal civil rights laws and [place] laws. We do not discriminate on the basis of race, color, national origin, age, disability sex, sexual orientation or gender identity.            Thank you!     Thank you for choosing [facility]  for your care. Our goal is always to provide you with excellent care.  Hearing back from our patients is one way we can continue to improve our services. Please take a few minutes to complete the written survey that you may receive in the mail after your visit with us. Thank you!             Your Updated Medication List - Protect others around you: Learn how to safely use, store and throw away your medicines at www.disposemymeds.org.          This list is accurate as of: 7/12/17 11:59 PM.  Always use your most recent med list.                   Brand Name Dispense Instructions for use Diagnosis    acyclovir 400 MG tablet    ZOVIRAX    60 tablet    Take 1 tablet (400 mg) by mouth 2 times daily Viral Prophylaxis.    Multiple myeloma not having achieved remission (H)       amLODIPine 5 MG tablet    NORVASC    30 tablet    TAKE 1 TABLET BY MOUTH DAILY    Essential hypertension, benign       * B-D U/F 31G X 8 MM   Generic drug:  insulin pen needle           * insulin pen needle 31G X 8 MM    B-D U/F    100 each    Use 5 pen needles daily or as directed.    Type 2 diabetes mellitus with stage 3 chronic kidney disease, with long-term current use of insulin (H)       * cyclophosphamide 50 MG capsule CHEMO    CYTOXAN    24 capsule    Take 6 capsules (300 mg) by mouth once a week    Multiple myeloma not having achieved remission (H)       * cyclophosphamide 50 MG capsule CHEMO    CYTOXAN    24 capsule    Take 6 capsules (300 mg) by mouth once a week    Multiple myeloma not having achieved remission (H)       dexamethasone 4 MG tablet    DECADRON    40 tablet    Take 10 tablets (40 mg) by mouth every 7 days Take daily on Days 1, 8, 15, and 22.    Multiple myeloma not having achieved remission (H)       ferrous sulfate 325 (65 FE) MG tablet    IRON     Take 325 mg by mouth daily (with breakfast)        FREESTYLE LITE test strip   Generic drug:  blood glucose monitoring     300 strip    USE TO TEST FOUR TIMES DAILY    Uncontrolled type 1 diabetes mellitus with stage 3 chronic kidney disease (H)        furosemide 20 MG tablet    LASIX    30 tablet    Take 1 tablet (20 mg) by mouth daily    Multiple myeloma not having achieved remission (H)       gabapentin 100 MG capsule    NEURONTIN    90 capsule    Take 2 capsules (200 mg) by mouth At Bedtime    Neuropathy (H)       gemfibrozil 600 MG tablet    LOPID    180 tablet    TAKE 1 TABLET BY MOUTH TWICE DAILY    Hyperlipidemia       glipiZIDE 10 MG tablet    GLUCOTROL    180 tablet    TAKE 1 TABLET BY MOUTH TWICE DAILY BEFORE A MEAL    Type 2 diabetes mellitus with stage 3 chronic kidney disease, with long-term current use of insulin (H)       insulin aspart 100 UNIT/ML injection    NovoLOG PEN    12 mL    Inject 1-6 Units Subcutaneous 4 times daily (with meals and nightly) Before meal correction: 140-225 - take 2 unit  226-310 - take 3 units 311-395 - take 4 units 396-480 - take 5 units >481 - take 6 units Bedtime correction of: 200-299 give 1 unit 300-399 take 2 units 400 or greater take 3 units On Wed-Thurs after chemo, add an additional 1-2 units to sliding scale.    Uncontrolled type 2 diabetes mellitus with hyperglycemia, unspecified long term insulin use status (H)       insulin detemir 100 UNIT/ML injection    LEVEMIR FLEXPEN/FLEXTOUCH    15 mL    Inject 12 Units Subcutaneous every morning (before breakfast)    Type 2 diabetes mellitus with diabetic chronic kidney disease, unspecified CKD stage, unspecified long term insulin use status (H)       levothyroxine 25 MCG tablet    SYNTHROID/LEVOTHROID    90 tablet    TAKE 1 TABLET BY MOUTH EVERY DAY.    Hypothyroidism       lisinopril 30 MG tablet    PRINIVIL,ZESTRIL    90 tablet    TAKE 1 TABLET BY MOUTH EVERY DAY    Essential hypertension, benign       metoprolol 25 MG tablet    LOPRESSOR    180 tablet    TAKE 1 TABLET BY MOUTH TWICE DAILY    Essential hypertension, hypertension with unspecified goal       omeprazole 20 MG CR capsule    priLOSEC    90 capsule    TAKE 1 CAPSULE BY MOUTH EVERY DAY    Congenital  hiatus hernia       ondansetron 8 MG tablet    ZOFRAN    10 tablet    Take 1 tablet (8 mg) by mouth every 8 hours as needed (Nausea/Vomiting)    Multiple myeloma not having achieved remission (H)       polyethylene glycol powder    MIRALAX/GLYCOLAX          prochlorperazine 5 MG tablet    COMPAZINE    30 tablet    Take 1 tablet (5 mg) by mouth every 6 hours as needed (Nausea/Vomiting)    Multiple myeloma not having achieved remission (H)       Selenium 200 MCG Tabs      Take 100 mcg by mouth daily. Pt takes one half tab of the 200 mcg daily        tamsulosin 0.4 MG capsule    FLOMAX    90 capsule    TAKE 1 CAPSULE BY MOUTH DAILY    Malignant neoplasm of prostate (H)       VITAMIN C PO      Take 1,000 mg by mouth daily        VITAMIN D3 PO      Take 1,000 Units by mouth daily        * Notice:  This list has 4 medication(s) that are the same as other medications prescribed for you. Read the directions carefully, and ask your doctor or other care provider to review them with you.

## 2017-07-18 NOTE — PROGRESS NOTES
Outpatient Physical Therapy Discharge Note     Patient: Roc Parkinson  : 1926    Beginning/End Dates of Reporting Period:  17 to 2017    Referring Provider: Dr. Quinn    Therapy Diagnosis: Impaired activity tolerance, generalized weakness     Client Self Report: States that he is doing fine, understands that in order to improve endurance he needs to find more time throughout the day to walk    Objective Measurements:  Objective Measure: 6MWT  Details: 1000' No rest break required    Objective Measure: FGA  Details:     Objective Measure: 30 sec sit to stand  Details: 5             Outcome Measures (most recent score):  FACIT Fatigue Subscale (score out of 52). The higher the score, the better the QOL.: 40  Six Minute Walk (meters). An increase of 70 or more meters indicates statistically significant change.: 304    Goals:  Goal Identifier 6MWT   Goal Description The client will improve 6MWT by at elast 70 meters for marked improvement in activity tolerance and gait speed   Target Date 17   Date Met      Progress: not met, improved by 100'     Goal Identifier FGA   Goal Description The client will score at least 22/30 on FGA for improved dynamic balance   Target Date 17   Date Met      Progress: not met     Goal Identifier Sit<>stand reps   Goal Description The client will improve sit<>stand reps in 30 seconds by 4 reps for improved functional strength   Target Date 17   Date Met   17   Progress:       Progress Toward Goals:   Progress this reporting period: The pt has made progress with LE strength and overall activity tolerance. His 6MWT distance improved by 100', did not require a seated rest break. The pt also improved his 30 sec sit to stand score to 5 reps, indicating an improvement in functional LE strength. The pt is independent with a HEP. Understands that he is still at an increased risk for falling per FGA score however understands when to be more cautious.  Feels comfortable continuing to progress LE strength and endurance independently.    Plan:  Discharge from therapy.    Discharge:    Reason for Discharge: No further expectation of progress.      Discharge Plan: Patient to continue home program.

## 2017-07-26 NOTE — PROGRESS NOTES
"Oncology Rooming Note    July 26, 2017 10:55 AM   Roc Parkinson is a 89 year old male who presents for:    Chief Complaint   Patient presents with     Oncology Clinic Visit     Initial Vitals: /53  Pulse 72  Temp 98.2  F (36.8  C) (Oral)  Resp 18  Ht 1.778 m (5' 10\")  Wt 90.4 kg (199 lb 6.4 oz)  SpO2 98%  BMI 28.61 kg/m2 Estimated body mass index is 28.61 kg/(m^2) as calculated from the following:    Height as of this encounter: 1.778 m (5' 10\").    Weight as of this encounter: 90.4 kg (199 lb 6.4 oz). Body surface area is 2.11 meters squared.  Data Unavailable Comment: Data Unavailable   No LMP for male patient.  Allergies reviewed: Yes  Medications reviewed: Yes    Medications: Medication refills not needed today.  Pharmacy name entered into [employer]:    [facility] [address] [place] [address] [address] AT [facility] & [address] [place] [address] PHARMACY [facility] [address]    Clinical concerns: None     5 minutes for nursing intake (face to face time)     Diana Mosley CMA      DISCHARGE PLAN:  Next appointments: See patient instruction section-- Susy  Departure Mode: Ambulatory  Accompanied by: RN to IVO with RN-to-RN report given to Doug ARGUETA.  10 minutes for nursing discharge (face to face time)   Bere Pace RN    1.  Continue cyclophosphamide, dexamethasone--formerly [facility] touched base with Pt & educated him re: Cytoxan/Dex   2.  Continue Zometa  4. Continue Aranesp 300 mcg SQ every three weeks---Hgb: 9.4 today so Pt received Aranesp in IVO.  5- RTC MD 3 weeks  6- Continue acyclovir--Pt verbalized understanding.  7- Discontinue neurontin.--Pt verbalized understanding.  8- Labs today- SPEP, IFXN, light chains--drawn in IVO by KENDALL Camacho          "

## 2017-07-26 NOTE — PATIENT INSTRUCTIONS
1.  Continue cyclophosphamide, dexamethasone   2.  Continue Zometa  4. Continue Aranesp 300 mcg SQ every three weeks  5- RTC MD 3 weeks  6- Continue acyclovir  7- Discontinue neurontin.  8- Labs today- SPEP, IFXN, light chains

## 2017-07-26 NOTE — PROGRESS NOTES
Infusion Nursing Note:  Roc Parkinson presents today for possible transfusion/aranesp.    Patient seen by provider today: Yes: Dr. Quinn   present during visit today: Not Applicable.    Note: N/A.    Intravenous Access:  Labs drawn without difficulty.  Peripheral IV placed.    Treatment Conditions:  Lab Results   Component Value Date    HGB 9.4 07/26/2017     Lab Results   Component Value Date    WBC 3.3 07/26/2017      Lab Results   Component Value Date    ANEU 2.9 07/26/2017     Lab Results   Component Value Date     07/26/2017      Results reviewed, labs MET treatment parameters, ok to proceed with treatment.        Post Infusion Assessment:  Patient tolerated injection without incident.  Site patent and intact, free from redness, edema or discomfort.    Discharge Plan:   Discharge instructions reviewed with: Patient.  Patient and/or family verbalized understanding of discharge instructions and all questions answered.  Copy of AVS reviewed with patient and/or family.  Patient will return 8/9/17 for next appointment.  Patient discharged in stable condition accompanied by: self.  Departure Mode: Ambulatory.    Doug Rios RN

## 2017-07-26 NOTE — PROGRESS NOTES
"[facility]  HEMATOLOGY ONCOLOGY     DIAGNOSIS:    1- Kappa light chain IgM multiple myeloma in a 89-year-old patient.  Bone marrow biopsy on 11/17/2016 showed hypercellular bone marrow with 65% cellularity of light chain restricted plasma cells.  FISH or G-banding could not be performed due to insufficient sample.   There is a background of myelodysplastic syndrome.  The patient was initially seen in the hospital on 11/17/2016 for macrocytic anemia and pancytopenia and transfusion requirement and a bone marrow biopsy was performed.   2- History of prostate cancer s/p EBRT s/p brachytherapy in 2003 (recent PSA 0.10), superficial bladder cancer ,no recurrences since 1986     TREATMENT: 12/15/16 velcade/dex. 4/12/17 added cyclophosphamide 300 mg weekly.6/28/17 we discontinued velcade due to concern for neuropathy and continued with weekly cyclophosphamide and dexamethasone.      SUBJECTIVE:  The patient was seen as a followup today. He did not have any significant improvement in right hand pain with neurontin. He has not needed any additional transfusions as well.     REVIEW OF SYSTEMS:  A complete review of systems was performed and found to be negative other than pertinent positives mentioned in history of present illness.     Past medical, social histories reviewed.    Meds- Reviewed.     PHYSICAL EXAMINATION:   VITAL SIGNS:/53  Pulse 72  Temp 98.2  F (36.8  C) (Oral)  Resp 18  Ht 1.778 m (5' 10\")  Wt 90.4 kg (199 lb 6.4 oz)  SpO2 98%  BMI 28.61 kg/m2  CONSTITUTIONAL: Sitting comfortably.   HEENT: Pupils are equal. Oropharynx is clear.   NECK: No cervical or supraclavicular lymphadenopathy.   RESPIRATORY: Clear bilaterally.   CARD/VASC: S1, S2, regular.   GI: Soft, nontender, nondistended, no hepatosplenomegaly.   MUSKULOSKELETAL: Warm, well perfused.   NEUROLOGIC: Alert, awake.   INTEGUMENT: No rash.   LYMPHATICS: Trace edema.   PSYCH: Mood and affect was normal.     LABORATORY " DATA AND IMAGING REVIEWED DURING THIS VISIT:  Recent Labs   Lab Test  07/05/17   0835  06/21/17   0915   NA  137  136   POTASSIUM  4.6  4.8   CHLORIDE  105  106   CO2  22  20   ANIONGAP  10  10   BUN  39*  33*   CR  1.22  1.12   GLC  262*  306*   MICHELLE  9.4  8.9     Recent Labs   Lab Test  07/26/17   1010  07/05/17   0835  06/28/17   0920   WBC  3.3*  3.4*  3.4*   HGB  9.4*  8.9*  9.7*   PLT  114*  118*  78*   MCV  110*  108*  109*   NEUTROPHIL  88.2  85.7  88.2     Recent Labs   Lab Test  07/05/17   0835  06/21/17   0915  06/14/17   0925  06/07/17   0915   11/17/16   0938   BILITOTAL  0.4  0.5   --   0.5   < >   --    ALKPHOS  67  81   --   82   < >   --    ALT  14  15   --   16   < >   --    AST  11  12   --   14   < >   --    ALBUMIN  2.9*  2.9*  3.1*  3.1*   < >   --    LDH   --    --    --    --    --   110    < > = values in this interval not displayed.     ECOG PS: 1    ASSESSMENT:  This is a 89-year-old gentleman who has kappa light chain multiple myeloma with hypercellular marrow with 65% of cells are light chain restricted plasma cells.  He had severe pancytopenia and has needed a transfusion in the hospital.  He did not have hypercalcemia and his renal function was normal. He has a background of myelodysplastic syndrome on his bone marrow biopsy; however, majority of the cell population was monoclonal plasma cell population.   He was started on treatment due to cytopenia.   In 4/2017 his light chain levels were getting worse. M spike was stable. We added cyclophosphamide to the regimen. In 6/2017 discontinued velcade.     - Labs were reviewed with the patient, Hb is 9.4 and stable. Thrombocytopenia at 114K. Leukopenia.   - He is on Aranesp 300 mcg SQ every three weeks for transfusion dependence.   - We will repeat myelom alabs today.  - He developed pain in his right in 6/2017 and there was a concern for development of neuropathy. He is not able to provide an accurate history of this new symptom. He has  tried neurontin and it did not help. I think it might be related to arthritis. We should be able to at least give velcade another try if needed.      PLAN:   1.  Continue cyclophosphamide, dexamethasone   2.  Continue Zometa  4. Continue Aranesp 300 mcg SQ every three weeks  5- RTC MD 3 weeks  6- Continue acyclovir  7- Discontinue neurontin.  8- Labs today- SPEP, IFXN, light chains  ALISON JON MD    7/26/2017

## 2017-07-26 NOTE — MR AVS SNAPSHOT
After Visit Summary   7/26/2017    Roc Parkinson    MRN: [id number]           Patient Information     Date Of Birth          7/7/1926        Visit Information        Provider Department      7/26/2017 10:00 AM  INFUSION CHAIR 9 [facility]        Today's Diagnoses     Multiple myeloma not having achieved remission (H)    -  1    Macrocytic anemia        MDS (myelodysplastic syndrome) (H)           Follow-ups after your visit        Your next 10 appointments already scheduled     Aug 04, 2017 10:30 AM CDT   Diabetic Education with  DIABETIC ED RESOURCE   [facility]    [address] [address]   [phone]            Aug 07, 2017 10:30 AM CDT   SHORT with Dickson Reich MD   [facility] ([facility])    [address] [address]   [phone]            Aug 09, 2017  1:30 PM CDT   Level 1 with  INFUSION CHAIR 12   [facility] ([facility])    [facility]  [address]  [facility] [address] [phone]              Who to contact     If you have questions or need follow up information about today's clinic visit or your schedule please contact [facility] directly at [phone].  Normal or non-critical lab and imaging results will be communicated to you by MyChart, letter or phone within 4 business days after the clinic has received the results. If you do not hear from us within 7 days, please contact the clinic through MyChart or phone. If you have a critical or abnormal lab result, we will notify you by phone as soon as possible.  Submit refill requests through [employer] or call your pharmacy and they will forward the refill request to us. Please allow 3 business  "days for your refill to be completed.          Additional Information About Your Visit        Care EveryWhere ID     This is your Care EveryWhere ID. This could be used by other organizations to access your [facility] medical records  [id number]        Your Vitals Were     Pulse Temperature Respirations Height Pulse Oximetry BMI (Body Mass Index)    72 98.2  F (36.8  C) (Oral) 18 1.778 m (5' 10\") 98% 28.61 kg/m2       Blood Pressure from Last 3 Encounters:   07/26/17 121/53   07/26/17 121/53   07/12/17 160/72    Weight from Last 3 Encounters:   07/26/17 90.4 kg (199 lb 6.4 oz)   07/26/17 90.4 kg (199 lb 6.4 oz)   06/28/17 88.1 kg (194 lb 3.2 oz)              We Performed the Following     CBC with platelets differential     Comprehensive metabolic panel          Today's Medication Changes          These changes are accurate as of: 7/26/17 11:49 AM.  If you have any questions, ask your nurse or doctor.               Stop taking these medicines if you haven't already. Please contact your care team if you have questions.     gabapentin 100 MG capsule   Commonly known as:  NEURONTIN   Stopped by:  Gretel Quinn MD                    Primary Care Provider Office Phone # Fax #    Dickson Clark Reich [phone]       [facility] [address]  [facility] [address]        Equal Access to Services     ELIE GAGNON AH: Hadii [place] ku hadasho Somaeali, waaxda luqadaha, qaybta kaalmada adeegyada, waxay wilton rodriguez. So [facility] [phone].    ATENCIÓN: Si habla español, tiene a nolan disposición servicios gratuitos de asistencia lingüística. Eric al [phone].    We comply with applicable federal civil rights laws and [place] laws. We do not discriminate on the basis of race, color, national origin, age, disability sex, sexual orientation or gender identity.            Thank you!     Thank you for choosing [facility]  for your care. Our " goal is always to provide you with excellent care. Hearing back from our patients is one way we can continue to improve our services. Please take a few minutes to complete the written survey that you may receive in the mail after your visit with us. Thank you!             Your Updated Medication List - Protect others around you: Learn how to safely use, store and throw away your medicines at www.disposemymeds.org.          This list is accurate as of: 7/26/17 11:49 AM.  Always use your most recent med list.                   Brand Name Dispense Instructions for use Diagnosis    acyclovir 400 MG tablet    ZOVIRAX    60 tablet    Take 1 tablet (400 mg) by mouth 2 times daily Viral Prophylaxis.    Multiple myeloma not having achieved remission (H)       amLODIPine 5 MG tablet    NORVASC    30 tablet    TAKE 1 TABLET BY MOUTH DAILY    Essential hypertension, benign       * B-D U/F 31G X 8 MM   Generic drug:  insulin pen needle           * insulin pen needle 31G X 8 MM    B-D U/F    100 each    Use 5 pen needles daily or as directed.    Type 2 diabetes mellitus with stage 3 chronic kidney disease, with long-term current use of insulin (H)       * cyclophosphamide 50 MG capsule CHEMO    CYTOXAN    24 capsule    Take 6 capsules (300 mg) by mouth once a week    Multiple myeloma not having achieved remission (H)       * cyclophosphamide 50 MG capsule CHEMO    CYTOXAN    24 capsule    Take 6 capsules (300 mg) by mouth once a week    Multiple myeloma not having achieved remission (H)       dexamethasone 4 MG tablet    DECADRON    40 tablet    Take 10 tablets (40 mg) by mouth every 7 days Take daily on Days 1, 8, 15, and 22.    Multiple myeloma not having achieved remission (H)       ferrous sulfate 325 (65 FE) MG tablet    IRON     Take 325 mg by mouth daily (with breakfast)        FREESTYLE LITE test strip   Generic drug:  blood glucose monitoring     300 strip    USE TO TEST FOUR TIMES DAILY    Uncontrolled type 1 diabetes  mellitus with stage 3 chronic kidney disease (H)       furosemide 20 MG tablet    LASIX    30 tablet    Take 1 tablet (20 mg) by mouth daily    Multiple myeloma not having achieved remission (H)       gemfibrozil 600 MG tablet    LOPID    180 tablet    TAKE 1 TABLET BY MOUTH TWICE DAILY    Hyperlipidemia       glipiZIDE 10 MG tablet    GLUCOTROL    180 tablet    TAKE 1 TABLET BY MOUTH TWICE DAILY BEFORE A MEAL    Type 2 diabetes mellitus with stage 3 chronic kidney disease, with long-term current use of insulin (H)       insulin aspart 100 UNIT/ML injection    NovoLOG PEN    12 mL    Inject 1-6 Units Subcutaneous 4 times daily (with meals and nightly) Before meal correction: 140-225 - take 2 unit  226-310 - take 3 units 311-395 - take 4 units 396-480 - take 5 units >481 - take 6 units Bedtime correction of: 200-299 give 1 unit 300-399 take 2 units 400 or greater take 3 units On Wed-Thurs after chemo, add an additional 1-2 units to sliding scale.    Uncontrolled type 2 diabetes mellitus with hyperglycemia, unspecified long term insulin use status (H)       insulin detemir 100 UNIT/ML injection    LEVEMIR FLEXPEN/FLEXTOUCH    15 mL    Inject 12 Units Subcutaneous every morning (before breakfast)    Type 2 diabetes mellitus with diabetic chronic kidney disease, unspecified CKD stage, unspecified long term insulin use status (H)       levothyroxine 25 MCG tablet    SYNTHROID/LEVOTHROID    90 tablet    TAKE 1 TABLET BY MOUTH EVERY DAY.    Hypothyroidism       lisinopril 30 MG tablet    PRINIVIL,ZESTRIL    90 tablet    TAKE 1 TABLET BY MOUTH EVERY DAY    Essential hypertension, benign       metoprolol 25 MG tablet    LOPRESSOR    180 tablet    TAKE 1 TABLET BY MOUTH TWICE DAILY    Essential hypertension, hypertension with unspecified goal       omeprazole 20 MG CR capsule    priLOSEC    90 capsule    TAKE 1 CAPSULE BY MOUTH EVERY DAY    Congenital hiatus hernia       ondansetron 8 MG tablet    ZOFRAN    10 tablet    Take  1 tablet (8 mg) by mouth every 8 hours as needed (Nausea/Vomiting)    Multiple myeloma not having achieved remission (H)       polyethylene glycol powder    MIRALAX/GLYCOLAX          prochlorperazine 5 MG tablet    COMPAZINE    30 tablet    Take 1 tablet (5 mg) by mouth every 6 hours as needed (Nausea/Vomiting)    Multiple myeloma not having achieved remission (H)       Selenium 200 MCG Tabs      Take 100 mcg by mouth daily. Pt takes one half tab of the 200 mcg daily        tamsulosin 0.4 MG capsule    FLOMAX    90 capsule    TAKE 1 CAPSULE BY MOUTH DAILY    Malignant neoplasm of prostate (H)       VITAMIN C PO      Take 1,000 mg by mouth daily        VITAMIN D3 PO      Take 1,000 Units by mouth daily        * Notice:  This list has 4 medication(s) that are the same as other medications prescribed for you. Read the directions carefully, and ask your doctor or other care provider to review them with you.

## 2017-07-26 NOTE — MR AVS SNAPSHOT
After Visit Summary   7/26/2017    Roc Parkinson    MRN: [id number]           Patient Information     Date Of Birth          7/7/1926        Visit Information        Provider Department      7/26/2017 10:45 AM Gretel Quinn MD [facility] [place] [facility]        Today's Diagnoses     Multiple myeloma not having achieved remission (H)    -  1      Care Instructions    1.  Continue cyclophosphamide, dexamethasone   2.  Continue Zometa  4. Continue Aranesp 300 mcg SQ every three weeks  5- RTC MD 3 weeks  6- Continue acyclovir  7- Discontinue neurontin.  8- Labs today- SPEP, IFXN, light chains          Follow-ups after your visit        Your next 10 appointments already scheduled     Aug 04, 2017 10:30 AM CDT   Diabetic Education with  DIABETIC ED RESOURCE   [facility]    [address] [address]   [phone]            Aug 07, 2017 10:30 AM CDT   SHORT with Dickson Reich MD   [facility]    [address] [place] [address]   [phone]            Aug 09, 2017  1:30 PM CDT   Level 1 with  INFUSION CHAIR 12   [facility] ([facility])    [facility]  [address]  [facility] [address]   [phone]            Aug 16, 2017  1:00 PM CDT   Return Visit with  Oncology Nurse   [facility] ([facility])    [facility]  [address]  [facility] [address]   [phone]            Aug 16, 2017  1:30 PM CDT   Level 1 with  INFUSION CHAIR 3   [facility] ([facility])    [facility]  [address]  [facility] [address]   [phone]            Aug 16, 2017  1:45 PM CDT   Return Visit with Gretel Quinn  "MD   [facility] ([facility])    [facility]  [address]  [place] [address] [phone]            Sep 06, 2017  1:00 PM CDT   Level 1 with  INFUSION CHAIR 7   [facility] ([facility])    [facility] [place]  [address]  [place] [address] [phone]            Sep 06, 2017  1:30 PM CDT   Return Visit with Gretel Quinn MD   [facility] ([facility])    [facility]  [address]  [place] [address] [phone]              Who to contact     If you have questions or need follow up information about today's clinic visit or your schedule please contact [facility] directly at [phone].  Normal or non-critical lab and imaging results will be communicated to you by Musationshart, letter or phone within 4 business days after the clinic has received the results. If you do not hear from us within 7 days, please contact the clinic through Musationshart or phone. If you have a critical or abnormal lab result, we will notify you by phone as soon as possible.  Submit refill requests through Curis or call your pharmacy and they will forward the refill request to us. Please allow 3 business days for your refill to be completed.          Additional Information About Your Visit        Care EveryWhere ID     This is your Care EveryWhere ID. This could be used by other organizations to access your [facility] medical records  [id number]        Your Vitals Were     Pulse Temperature Respirations Height Pulse Oximetry BMI (Body Mass Index)    72 98.2  F (36.8  C) (Oral) 18 1.778 m (5' 10\") 98% 28.61 kg/m2       Blood Pressure from Last 3 Encounters:   No data found for BP    Weight from Last 3 Encounters:   No data found for Wt              Today, you had the following     No orders found for display         Today's " Medication Changes          These changes are accurate as of: 7/26/17 11:59 PM.  If you have any questions, ask your nurse or doctor.               These medicines have changed or have updated prescriptions.        Dose/Directions    * cyclophosphamide 50 MG capsule CHEMO   Commonly known as:  CYTOXAN   This may have changed:  Another medication with the same name was added. Make sure you understand how and when to take each.   Used for:  Multiple myeloma not having achieved remission (H)        Dose:  300 mg   Take 6 capsules (300 mg) by mouth once a week   Quantity:  24 capsule   Refills:  0       * cyclophosphamide 50 MG capsule CHEMO   Commonly known as:  CYTOXAN   This may have changed:  Another medication with the same name was added. Make sure you understand how and when to take each.   Used for:  Multiple myeloma not having achieved remission (H)        Dose:  300 mg   Take 6 capsules (300 mg) by mouth once a week   Quantity:  24 capsule   Refills:  0       * cyclophosphamide 50 MG capsule CHEMO   Commonly known as:  CYTOXAN   This may have changed:  You were already taking a medication with the same name, and this prescription was added. Make sure you understand how and when to take each.   Used for:  Multiple myeloma not having achieved remission (H)   Changed by:  Gretel Quinn MD        Dose:  300 mg   Take 6 capsules (300 mg) by mouth once a week   Quantity:  24 capsule   Refills:  0       * dexamethasone 4 MG tablet   Commonly known as:  DECADRON   This may have changed:  Another medication with the same name was added. Make sure you understand how and when to take each.   Used for:  Multiple myeloma not having achieved remission (H)        Dose:  40 mg   Take 10 tablets (40 mg) by mouth every 7 days Take daily on Days 1, 8, 15, and 22.   Quantity:  40 tablet   Refills:  0       * dexamethasone 4 MG tablet   Commonly known as:  DECADRON   This may have changed:  You were already taking a medication with  the same name, and this prescription was added. Make sure you understand how and when to take each.   Used for:  Multiple myeloma not having achieved remission (H)   Changed by:  Gretel Quinn MD        Dose:  40 mg   Take 10 tablets (40 mg) by mouth every 7 days for 4 doses Take daily on Days 1, 8, 15, and 22.   Quantity:  40 tablet   Refills:  0       * Notice:  This list has 5 medication(s) that are the same as other medications prescribed for you. Read the directions carefully, and ask your doctor or other care provider to review them with you.      Stop taking these medicines if you haven't already. Please contact your care team if you have questions.     gabapentin 100 MG capsule   Commonly known as:  NEURONTIN   Stopped by:  Gretel Quinn MD                Where to get your medicines      These medications were sent to [facility] [place], [place] - [address]  [address] [facility] [address], [place] [address]     Phone:  [phone]     cyclophosphamide 50 MG capsule CHEMO    dexamethasone 4 MG tablet                Primary Care Provider Office Phone # Fax #    Dickson Reich [phone]       [facility] [address]  [place] [address]        Equal Access to Services     ELIE GAGNON AH: Hadii virginia wilson hadasho Somaeali, waaxda luqadaha, qaybta kaalmada adeegyada, melita rodriguez. So [facility] [phone].    ATENCIÓN: Si habla español, tiene a nolan disposición servicios gratuitos de asistencia lingüística. Llame al [phone].    We comply with applicable federal civil rights laws and [place] laws. We do not discriminate on the basis of race, color, national origin, age, disability sex, sexual orientation or gender identity.            Thank you!     Thank you for choosing [facility]  for your care. Our goal is always to provide you with excellent care. Hearing back from our patients is one way we can continue to  improve our services. Please take a few minutes to complete the written survey that you may receive in the mail after your visit with us. Thank you!             Your Updated Medication List - Protect others around you: Learn how to safely use, store and throw away your medicines at www.disposemymeds.org.          This list is accurate as of: 7/26/17 11:59 PM.  Always use your most recent med list.                   Brand Name Dispense Instructions for use Diagnosis    acyclovir 400 MG tablet    ZOVIRAX    60 tablet    Take 1 tablet (400 mg) by mouth 2 times daily Viral Prophylaxis.    Multiple myeloma not having achieved remission (H)       amLODIPine 5 MG tablet    NORVASC    30 tablet    TAKE 1 TABLET BY MOUTH DAILY    Essential hypertension, benign       * B-D U/F 31G X 8 MM   Generic drug:  insulin pen needle           * insulin pen needle 31G X 8 MM    B-D U/F    100 each    Use 5 pen needles daily or as directed.    Type 2 diabetes mellitus with stage 3 chronic kidney disease, with long-term current use of insulin (H)       * cyclophosphamide 50 MG capsule CHEMO    CYTOXAN    24 capsule    Take 6 capsules (300 mg) by mouth once a week    Multiple myeloma not having achieved remission (H)       * cyclophosphamide 50 MG capsule CHEMO    CYTOXAN    24 capsule    Take 6 capsules (300 mg) by mouth once a week    Multiple myeloma not having achieved remission (H)       * cyclophosphamide 50 MG capsule CHEMO    CYTOXAN    24 capsule    Take 6 capsules (300 mg) by mouth once a week    Multiple myeloma not having achieved remission (H)       * dexamethasone 4 MG tablet    DECADRON    40 tablet    Take 10 tablets (40 mg) by mouth every 7 days Take daily on Days 1, 8, 15, and 22.    Multiple myeloma not having achieved remission (H)       * dexamethasone 4 MG tablet    DECADRON    40 tablet    Take 10 tablets (40 mg) by mouth every 7 days for 4 doses Take daily on Days 1, 8, 15, and 22.    Multiple myeloma not having  achieved remission (H)       ferrous sulfate 325 (65 FE) MG tablet    IRON     Take 325 mg by mouth daily (with breakfast)        FREESTYLE LITE test strip   Generic drug:  blood glucose monitoring     300 strip    USE TO TEST FOUR TIMES DAILY    Uncontrolled type 1 diabetes mellitus with stage 3 chronic kidney disease (H)       furosemide 20 MG tablet    LASIX    30 tablet    Take 1 tablet (20 mg) by mouth daily    Multiple myeloma not having achieved remission (H)       gemfibrozil 600 MG tablet    LOPID    180 tablet    TAKE 1 TABLET BY MOUTH TWICE DAILY    Hyperlipidemia       glipiZIDE 10 MG tablet    GLUCOTROL    180 tablet    TAKE 1 TABLET BY MOUTH TWICE DAILY BEFORE A MEAL    Type 2 diabetes mellitus with stage 3 chronic kidney disease, with long-term current use of insulin (H)       insulin aspart 100 UNIT/ML injection    NovoLOG PEN    12 mL    Inject 1-6 Units Subcutaneous 4 times daily (with meals and nightly) Before meal correction: 140-225 - take 2 unit  226-310 - take 3 units 311-395 - take 4 units 396-480 - take 5 units >481 - take 6 units Bedtime correction of: 200-299 give 1 unit 300-399 take 2 units 400 or greater take 3 units On Wed-Thurs after chemo, add an additional 1-2 units to sliding scale.    Uncontrolled type 2 diabetes mellitus with hyperglycemia, unspecified long term insulin use status (H)       insulin detemir 100 UNIT/ML injection    LEVEMIR FLEXPEN/FLEXTOUCH    15 mL    Inject 12 Units Subcutaneous every morning (before breakfast)    Type 2 diabetes mellitus with diabetic chronic kidney disease, unspecified CKD stage, unspecified long term insulin use status (H)       levothyroxine 25 MCG tablet    SYNTHROID/LEVOTHROID    90 tablet    TAKE 1 TABLET BY MOUTH EVERY DAY.    Hypothyroidism       lisinopril 30 MG tablet    PRINIVIL,ZESTRIL    90 tablet    TAKE 1 TABLET BY MOUTH EVERY DAY    Essential hypertension, benign       omeprazole 20 MG CR capsule    priLOSEC    90 capsule    TAKE  1 CAPSULE BY MOUTH EVERY DAY    Congenital hiatus hernia       ondansetron 8 MG tablet    ZOFRAN    10 tablet    Take 1 tablet (8 mg) by mouth every 8 hours as needed (Nausea/Vomiting)    Multiple myeloma not having achieved remission (H)       polyethylene glycol powder    MIRALAX/GLYCOLAX          prochlorperazine 5 MG tablet    COMPAZINE    30 tablet    Take 1 tablet (5 mg) by mouth every 6 hours as needed (Nausea/Vomiting)    Multiple myeloma not having achieved remission (H)       Selenium 200 MCG Tabs      Take 100 mcg by mouth daily. Pt takes one half tab of the 200 mcg daily        tamsulosin 0.4 MG capsule    FLOMAX    90 capsule    TAKE 1 CAPSULE BY MOUTH DAILY    Malignant neoplasm of prostate (H)       VITAMIN C PO      Take 1,000 mg by mouth daily        VITAMIN D3 PO      Take 1,000 Units by mouth daily        * Notice:  This list has 7 medication(s) that are the same as other medications prescribed for you. Read the directions carefully, and ask your doctor or other care provider to review them with you.

## 2017-07-27 NOTE — PROGRESS NOTES
Oral Chemotherapy Monitoring Program.    Patient currently on Cytoxan therapy.    Reviewed lab results from 7/26/17.    Labs meet parameters to continue treatment.     Will follow up in 3-4 weeks    Alvarez May PharmD  Oral Chemotherapy Program

## 2017-07-31 NOTE — TELEPHONE ENCOUNTER
Metoprolol 25 mg      Last Written Prescription Date: 7/27/16  Last Fill Quantity: 180, # refills: 3    Last Office Visit with G, P or [facility] prescribing provider:  6/27/17   Future Office Visit:    Next 5 appointments (look out 90 days)     Aug 07, 2017 10:30 AM CDT   SHORT with Dickson Reich MD   [facility]    [address] [place] [address]   [phone]            Aug 16, 2017  1:00 PM CDT   Return Visit with  Oncology Nurse   [facility]    [facility]  [address]  [place] [address]   [phone]            Aug 16, 2017  1:45 PM CDT   Return Visit with Gretel Quinn MD   [facility]    [facility]  [address]  [place] [address]   [phone]            Sep 06, 2017  1:30 PM CDT   Return Visit with Gretel Quinn MD   [facility]    [facility]  [address]  [facility] [address]   [phone]                    BP Readings from Last 3 Encounters:   07/26/17 121/53   07/26/17 121/53   07/12/17 160/72

## 2017-08-04 NOTE — MR AVS SNAPSHOT
After Visit Summary   8/4/2017    Roc Parkinson    MRN: [id number]           Patient Information     Date Of Birth          7/7/1926        Visit Information        Provider Department      8/4/2017 10:30 AM  DIABETIC ED RESOURCE [facility] Instructions    My Diabetes Care Goals:    Increase Levemir dose to 14 units every morning.    Start taking a minimum dose of 4 units Novolog before every meal + additional units per your sliding scale.    On chemo days (Wed-Thurs):    Before meal correction:                     140-225 - add 3 units                     226-310 - add 4 units                     311-395 - add 5 units                     396-480 - add 6 units                     >481 - add 7 units  Bedtime correction of:                     200-299 add 1 unit                     300-399 add 2 units                     400 or greater - add 3 units        On other days of the week, continue your current sliding scale (less insulin):    Before meal correction:                     140-225 - add 1 unit                      226-310 - add 2 units                     311-395 - add 3 units                     396-480 - add 4 units                     greater than 480 - add 5 units  Bedtime correction of:                     200-299 add 1 unit                     300-399 add 2 units                     400 or greater - add 3 units    Follow up:  Follow-up diabetes education appointment scheduled on Tues, Aug. 29th at 10:30 am.       Bring blood glucose meter and logbook with you to all doctor and follow-up appointments.     [facility] for the [facility]:  For Your Diabetes Education and Nutrition Appointments Call:  [phone]   For Diabetes Education or Nutrition Related Questions:   Phone: [phone]  E-mail: [email or web address]  Fax: [phone]   If you need a medication refill please contact your pharmacy.  Please allow 3 business days for your refills to be completed.    Instructions for emailing the Diabetes Educators    If you need to communicate a non-urgent message to a Diabetes Educator via email, please send to [email or web address].    Please follow the following email guidelines:    Subject line: Secure: your clinic name (example: Secure: [facility])  In the email please include: First name, middle initial, last name and date of birth.    We will be in touch with you within one (1) business day.               Follow-ups after your visit        Your next 10 appointments already scheduled     Aug 07, 2017 10:30 AM CDT   SHORT with Dickson Reich MD   [facility]    [address] [place] [address]   [phone]            Aug 09, 2017  1:30 PM CDT   Level 1 with  INFUSION CHAIR 12   [facility] ([facility])    [facility] [place] [id number]  [facility] [address]   [phone]            Aug 16, 2017  1:00 PM CDT   Return Visit with  Oncology Nurse   [facility] ([facility])    [facility]  [address]  [facility] [address]   [phone]            Aug 16, 2017  1:30 PM CDT   Level 1 with  INFUSION CHAIR 3   [facility] ([facility])    [facility] [place] [id number]  [facility] [address]   [phone]            Aug 16, 2017  1:45 PM CDT   Return Visit with Gretel Quinn MD   [facility] ([facility])    [facility]  [address]  [facility] [address]   [phone]            Aug 29, 2017 10:30 AM CDT   Diabetic Education with  DIABETIC ED RESOURCE   [facility]    [address] [address]   [phone]            Sep 06, 2017  1:00 PM CDT   Level 1 with  INFUSION CHAIR 7   [facility] ([facility])    [facility] [place]  [address]  [place] [address] [phone]            Sep 06, 2017  1:30 PM CDT   Return Visit with Gretel Quinn MD   [facility] ([facility])    [facility] [place] [address]  [place] [address] [phone]              Who to contact     If you have questions or need follow up information about today's clinic visit or your schedule please contact [facility] directly at [phone].  Normal or non-critical lab and imaging results will be communicated to you by MyChart, letter or phone within 4 business days after the clinic has received the results. If you do not hear from us within 7 days, please contact the clinic through MyChart or phone. If you have a critical or abnormal lab result, we will notify you by phone as soon as possible.  Submit refill requests through [employer] or call your pharmacy and they will forward the refill request to us. Please allow 3 business days for your refill to be completed.          Additional Information About Your Visit        Care EveryWhere ID     This is your Care EveryWhere ID. This could be used by other organizations to access your [place] medical records  [id number]         Blood Pressure from Last 3 Encounters:   07/26/17 121/53   07/26/17 121/53   07/12/17 160/72    Weight from Last 3 Encounters:   07/26/17 90.4 kg (199 lb 6.4 oz)   07/26/17 90.4 kg (199 lb 6.4 oz)   06/28/17 88.1 kg (194 lb 3.2 oz)              Today, you had the following     No orders found for display       Primary Care Provider Office Phone # Fax #    Dickson Reich [phone]       [facility] [address]  [facility] [address]        Equal Access to Services     SANDRA GAGNON : Hadii aad ku hadpernell Soaimee, waemoryda luqadaha, qaybta kaalmada paddy, melita rodriguez. So [facility] [phone].    ATENCIÓN: Si habla español, tiene a nolan disposición servicios gratuitos de asistencia lingüística. Willyame al [phone].    We comply with applicable federal civil rights laws and [place] laws. We do not discriminate on the basis of race, color, national origin, age, disability sex, sexual orientation or gender identity.            Thank you!     Thank you for choosing [facility]  for your care. Our goal is always to provide you with excellent care. Hearing back from our patients is one way we can continue to improve our services. Please take a few minutes to complete the written survey that you may receive in the mail after your visit with us. Thank you!             Your Updated Medication List - Protect others around you: Learn how to safely use, store and throw away your medicines at www.disposemymeds.org.          This list is accurate as of: 8/4/17 11:07 AM.  Always use your most recent med list.                   Brand Name Dispense Instructions for use Diagnosis    acyclovir 400 MG tablet    ZOVIRAX    60 tablet    Take 1 tablet (400 mg) by mouth 2 times daily Viral Prophylaxis.    Multiple myeloma not having achieved remission (H)       amLODIPine 5 MG tablet    NORVASC    30 tablet    TAKE 1 TABLET BY MOUTH DAILY    Essential hypertension, benign       * B-D U/F 31G X 8 MM   Generic drug:  insulin pen needle           * insulin pen needle 31G X 8 MM    B-D U/F    100 each    Use 5 pen needles daily or as directed.    Type 2 diabetes mellitus with stage 3 chronic kidney disease, with long-term current use of insulin (H)       * cyclophosphamide 50 MG capsule CHEMO    CYTOXAN    24 capsule    Take 6 capsules (300 mg) by mouth once a week    Multiple myeloma not having  achieved remission (H)       * cyclophosphamide 50 MG capsule CHEMO    CYTOXAN    24 capsule    Take 6 capsules (300 mg) by mouth once a week    Multiple myeloma not having achieved remission (H)       * cyclophosphamide 50 MG capsule CHEMO    CYTOXAN    24 capsule    Take 6 capsules (300 mg) by mouth once a week    Multiple myeloma not having achieved remission (H)       * dexamethasone 4 MG tablet    DECADRON    40 tablet    Take 10 tablets (40 mg) by mouth every 7 days Take daily on Days 1, 8, 15, and 22.    Multiple myeloma not having achieved remission (H)       * dexamethasone 4 MG tablet    DECADRON    40 tablet    Take 10 tablets (40 mg) by mouth every 7 days for 4 doses Take daily on Days 1, 8, 15, and 22.    Multiple myeloma not having achieved remission (H)       ferrous sulfate 325 (65 FE) MG tablet    IRON     Take 325 mg by mouth daily (with breakfast)        FREESTYLE LITE test strip   Generic drug:  blood glucose monitoring     300 strip    USE TO TEST FOUR TIMES DAILY    Uncontrolled type 1 diabetes mellitus with stage 3 chronic kidney disease (H)       furosemide 20 MG tablet    LASIX    30 tablet    Take 1 tablet (20 mg) by mouth daily    Multiple myeloma not having achieved remission (H)       gemfibrozil 600 MG tablet    LOPID    180 tablet    TAKE 1 TABLET BY MOUTH TWICE DAILY    Hyperlipidemia       glipiZIDE 10 MG tablet    GLUCOTROL    180 tablet    TAKE 1 TABLET BY MOUTH TWICE DAILY BEFORE A MEAL    Type 2 diabetes mellitus with stage 3 chronic kidney disease, with long-term current use of insulin (H)       insulin aspart 100 UNIT/ML injection    NovoLOG PEN    12 mL    Inject 1-6 Units Subcutaneous 4 times daily (with meals and nightly) Before meal correction: 140-225 - take 2 unit  226-310 - take 3 units 311-395 - take 4 units 396-480 - take 5 units >481 - take 6 units Bedtime correction of: 200-299 give 1 unit 300-399 take 2 units 400 or greater take 3 units On Wed-Thurs after chemo, add  an additional 1-2 units to sliding scale.    Uncontrolled type 2 diabetes mellitus with hyperglycemia, unspecified long term insulin use status (H)       insulin detemir 100 UNIT/ML injection    LEVEMIR FLEXPEN/FLEXTOUCH    15 mL    Inject 12 Units Subcutaneous every morning (before breakfast)    Type 2 diabetes mellitus with diabetic chronic kidney disease, unspecified CKD stage, unspecified long term insulin use status (H)       levothyroxine 25 MCG tablet    SYNTHROID/LEVOTHROID    90 tablet    TAKE 1 TABLET BY MOUTH EVERY DAY.    Hypothyroidism       lisinopril 30 MG tablet    PRINIVIL,ZESTRIL    90 tablet    TAKE 1 TABLET BY MOUTH EVERY DAY    Essential hypertension, benign       metoprolol 25 MG tablet    LOPRESSOR    180 tablet    TAKE 1 TABLET BY MOUTH TWICE DAILY    Essential hypertension       omeprazole 20 MG CR capsule    priLOSEC    90 capsule    TAKE 1 CAPSULE BY MOUTH EVERY DAY    Congenital hiatus hernia       ondansetron 8 MG tablet    ZOFRAN    10 tablet    Take 1 tablet (8 mg) by mouth every 8 hours as needed (Nausea/Vomiting)    Multiple myeloma not having achieved remission (H)       polyethylene glycol powder    MIRALAX/GLYCOLAX          prochlorperazine 5 MG tablet    COMPAZINE    30 tablet    Take 1 tablet (5 mg) by mouth every 6 hours as needed (Nausea/Vomiting)    Multiple myeloma not having achieved remission (H)       Selenium 200 MCG Tabs      Take 100 mcg by mouth daily. Pt takes one half tab of the 200 mcg daily        tamsulosin 0.4 MG capsule    FLOMAX    90 capsule    TAKE 1 CAPSULE BY MOUTH DAILY    Malignant neoplasm of prostate (H)       VITAMIN C PO      Take 1,000 mg by mouth daily        VITAMIN D3 PO      Take 1,000 Units by mouth daily        * Notice:  This list has 7 medication(s) that are the same as other medications prescribed for you. Read the directions carefully, and ask your doctor or other care provider to review them with you.

## 2017-08-04 NOTE — Clinical Note
Carlos Alberto Reich,  Ingrid- progress note on Roc. We continue to adjust his insulin doses based on BG readings. Recommended that he start taking a base dose of Novolog before every meal + his sliding scale which has been adjusted (more on chemo days). Levemir slightly increased as well.   Thank you! Juana Stout RD, CDE Diabetes

## 2017-08-04 NOTE — Clinical Note
Carlos Alberto anders, patient on your schedule for follow up on 8/29.   Thanks! Juana Stout RD, CDE Diabetes

## 2017-08-04 NOTE — PROGRESS NOTES
Diabetes Self Management Training: Follow-up Visit    Roc Parkinson presents today for education and evaluation of glucose control related to Type 2 diabetes.    He is accompanied by self    Patient's diabetes management related comments/concerns: reports that he had to increase his sliding scale insulin    Patient would like this visit to be focused around the following diabetes-related behaviors and goals: Problem Solving    ASSESSMENT:  Patient Problem List reviewed for relevant medical history and current medical status.    Current Diabetes Management per Patient:    Diabetes Medication(s)     Insulin Sig    insulin aspart (NOVOLOG PEN) 100 UNIT/ML injection Inject 1-6 Units Subcutaneous 4 times daily (with meals and nightly) Before meal correction:   140-225 - take 2 unit    226-310 - take 3 units   311-395 - take 4 units   396-480 - take 5 units   >481 - take 6 units  Bedtime correction of:   200-299 give 1 unit   300-399 take 2 units   400 or greater take 3 units  On Wed-Thurs after chemo, add an additional 1-2 units to sliding scale.    insulin detemir (LEVEMIR FLEXPEN/FLEXTOUCH) 100 UNIT/ML injection Inject 12 Units Subcutaneous every morning (before breakfast)    Sulfonylureas Sig    glipiZIDE (GLUCOTROL) 10 MG tablet TAKE 1 TABLET BY MOUTH TWICE DAILY BEFORE A MEAL        Problems taking diabetes medications regularly? No , Side effects? No     Has been increasing his sliding scale due to still having some significantly elevated BG's.     Patient brought in the following record:        Patient glucose self monitoring as follows: four times daily.   BG meter: Freestyle Lite meter  BG results:        BG values are: Not in goal  Patient's most recent A1c  is not meeting goal of <8.0    Lab Results   Component Value Date    A1C 8.0 05/23/2017       Also note reduced Hgb potentially lowering A1c value:      Nutrition:  Patient eats 3 meals per day and has a consistent intake of carbohydrates. Reports  "appetite usually ok.     Physical Activity:    Not disucssed    Diabetes Complications:  Not discussed today.    Vitals:  There were no vitals taken for this visit.  Estimated body mass index is 28.61 kg/(m^2) as calculated from the following:    Height as of 7/26/17: 1.778 m (5' 10\").    Weight as of 7/26/17: 90.4 kg (199 lb 6.4 oz).   Last 3 BP:   BP Readings from Last 3 Encounters:   07/26/17 121/53   07/26/17 121/53   07/12/17 160/72       History   Smoking Status     Never Smoker   Smokeless Tobacco     Never Used       Labs:  Lab Results   Component Value Date    A1C 8.0 05/23/2017     Lab Results   Component Value Date     07/26/2017     Lab Results   Component Value Date    LDL 64 05/23/2017     HDL Cholesterol   Date Value Ref Range Status   05/23/2017 35 (L) >39 mg/dL Final   ]  GFR Estimate   Date Value Ref Range Status   07/26/2017 72 >60 mL/min/1.7m2 Final     Comment:     Non  GFR Calc     GFR Estimate If Black   Date Value Ref Range Status   07/26/2017 87 >60 mL/min/1.7m2 Final     Comment:      GFR Calc     Lab Results   Component Value Date    CR 0.98 07/26/2017     No results found for: MICROALBUMIN    Health Beliefs and Attitudes:   Patient Activation Measure Survey Score:  FANNY Score (Last Two) 10/14/2015 9/28/2016   FANNY Raw Score 40 29   Activation Score 60 52.9   FANNY Level 3 2       Stage of Change: ACTION (Actively working towards change)    Diabetes knowledge and skills assessment:     Patient is knowledgeable in diabetes management concepts related to: Healthy Eating, Monitoring, Taking Medication and Problem Solving    Patient needs further education on the following diabetes management concepts: Monitoring, Taking Medication and Problem Solving    Barriers to Learning Assessment: No Barriers identified    Based on learning assessment above, most appropriate setting for further diabetes education would be: Individual setting.    INTERVENTION:  Education " provided today on:  AADE Self-Care Behaviors:  Monitoring: reviewed BG pattern, noted with patient that if he wakes with a BG in range and does not use SSI- his blood sugar is significantly elevated by lunch time.   Taking Medication: action of prescribed medication and proper dosing, recommend start taking Novolog 4 units (base dose) with every meal to cover carbs + add additional units per sliding scale. Recommend continue increased SSI on chemo days (Wed-Thurs). Also recommend increase Levemir to 14 units.   Problem Solving: high blood glucose - causes, signs/symptoms, treatment and prevention, low blood glucose - causes, signs/symptoms, treatment and prevention and carrying a carbohydrate source at all times    Opportunities for ongoing education and support in diabetes-self management were discussed.    Pt verbalized understanding of concepts discussed and recommendations provided today.       Education Materials Provided:  Hypoglycemia Signs and Symptoms    PLAN:  See Patient Instructions for co-developed, patient-stated behavior change goals.  AVS printed and provided to patient today.    FOLLOW-UP:  Follow-up appointment scheduled on Aug. 29 at 10:30 .  Chart routed to referring provider.    Ongoing plan for education and support: Follow-up visit with diabetes educator  and Follow-up with primary care provider    Juana Stout RD, CDE  Diabetes     Time Spent: 30 minutes  Encounter Type: Individual    Any diabetes medication dose changes were made via the CDE Protocol and Collaborative Practice Agreement with the patient's primary care provider. A copy of this encounter was shared with the provider.

## 2017-08-04 NOTE — PATIENT INSTRUCTIONS
My Diabetes Care Goals:    Increase Levemir dose to 14 units every morning.    Start taking a minimum dose of 4 units Novolog before every meal + additional units per your sliding scale.    On chemo days (Wed-Thurs):    Before meal correction:                     140-225 - add 3 units                     226-310 - add 4 units                     311-395 - add 5 units                     396-480 - add 6 units                     >481 - add 7 units  Bedtime correction of:                     200-299 add 1 unit                     300-399 add 2 units                     400 or greater - add 3 units        On other days of the week, continue your current sliding scale (less insulin):    Before meal correction:                     140-225 - add 1 unit                      226-310 - add 2 units                     311-395 - add 3 units                     396-480 - add 4 units                     greater than 480 - add 5 units  Bedtime correction of:                     200-299 add 1 unit                     300-399 add 2 units                     400 or greater - add 3 units    Follow up:  Follow-up diabetes education appointment scheduled on Tues, Aug. 29th at 10:30 am.       Bring blood glucose meter and logbook with you to all doctor and follow-up appointments.     [place] for the [facility]:  For Your Diabetes Education and Nutrition Appointments Call:  [phone]   For Diabetes Education or Nutrition Related Questions:   Phone: [phone]  E-mail: [email or web address]  Fax: [phone]   If you need a medication refill please contact your pharmacy. Please allow 3 business days for your refills to be completed.    Instructions for emailing the Diabetes Educators    If you need to communicate a non-urgent message to a Diabetes Educator via email, please send to [email or web address].    Please follow the following email guidelines:    Subject line: Secure:  your clinic name (example: Secure: [place])  In the email please include: First name, middle initial, last name and date of birth.    We will be in touch with you within one (1) business day.

## 2017-08-07 PROBLEM — N18.30 TYPE 2 DIABETES MELLITUS WITH STAGE 3 CHRONIC KIDNEY DISEASE, WITH LONG-TERM CURRENT USE OF INSULIN (H): Status: ACTIVE | Noted: 2017-01-01

## 2017-08-07 PROBLEM — E11.22 TYPE 2 DIABETES MELLITUS WITH STAGE 3 CHRONIC KIDNEY DISEASE, WITH LONG-TERM CURRENT USE OF INSULIN (H): Status: ACTIVE | Noted: 2017-01-01

## 2017-08-07 PROBLEM — Z79.4 TYPE 2 DIABETES MELLITUS WITH STAGE 3 CHRONIC KIDNEY DISEASE, WITH LONG-TERM CURRENT USE OF INSULIN (H): Status: ACTIVE | Noted: 2017-01-01

## 2017-08-07 NOTE — PATIENT INSTRUCTIONS
The patient's blood sugars were reviewed with him.  He has had an occasional blood sugar limits been down in the mid 90s.  Most have been between 1:30 and some into the mid 200s.  CDE note was reviewed.  I agree with the changes made to his insulins.  The patient and I discussed that today.  He has a follow-up appointment on August 29.  He will keep track of his blood sugars as he has been doing and call us next week with his results.  We can make further adjustments with his insulins at that point as necessary.  The patient is anxious to get his blood sugars down around 100.  He clearly is motivated to do this.

## 2017-08-07 NOTE — NURSING NOTE
"Chief Complaint   Patient presents with     Diabetes       Initial /60  Pulse 67  Temp 98  F (36.7  C) (Tympanic)  Resp 16  Wt 197 lb (89.4 kg)  BMI 28.27 kg/m2 Estimated body mass index is 28.27 kg/(m^2) as calculated from the following:    Height as of 7/26/17: 5' 10\" (1.778 m).    Weight as of this encounter: 197 lb (89.4 kg).  Medication Reconciliation: complete     Mariia Allen CMA      "

## 2017-08-07 NOTE — MR AVS SNAPSHOT
After Visit Summary   8/7/2017    Roc Parkinson    MRN: [id number]           Patient Information     Date Of Birth          7/7/1926        Visit Information        Provider Department      8/7/2017 10:30 AM Dickson Reich MD [facility]        Today's Diagnoses     Type 2 diabetes mellitus with stage 3 chronic kidney disease, with long-term current use of insulin (H)    -  1    Multiple myeloma not having achieved remission (H)        CKD (chronic kidney disease) stage 3, GFR 30-59 ml/min          Care Instructions    The patient's blood sugars were reviewed with him.  He has had an occasional blood sugar limits been down in the mid 90s.  Most have been between 1:30 and some into the mid 200s.  CDE note was reviewed.  I agree with the changes made to his insulins.  The patient and I discussed that today.  He has a follow-up appointment on August 29.  He will keep track of his blood sugars as he has been doing and call us next week with his results.  We can make further adjustments with his insulins at that point as necessary.  The patient is anxious to get his blood sugars down around 100.  He clearly is motivated to do this.          Follow-ups after your visit        Your next 10 appointments already scheduled     Aug 09, 2017  1:30 PM CDT   Level 1 with  INFUSION CHAIR 12   [facility] ([facility])    [facility]  [address]  [facility] [address]   [phone]            Aug 15, 2017 10:45 AM CDT   SHORT with Dickson Reich MD   [facility] ([facility])    [address] [place] [address]   [phone]            Aug 16, 2017  1:00 PM CDT   Return Visit with  Oncology Nurse   [facility] ([facility])    [facility]  [address]  [place] [address]   [phone]            Aug 16, 2017  1:30 PM CDT   Level 1 with  INFUSION CHAIR 3   [facility] ([facility])    [facility] Jaramillo  [address]  [place] [address]   [phone]            Aug 16, 2017  1:45 PM CDT   Return Visit with Gretel Quinn MD   [facility] ([facility])    [facility] [address] [address]  [place] [address]   [phone]            Aug 29, 2017 10:30 AM CDT   Diabetic Education with  DIABETIC ED RESOURCE   [facility] ([facility])    [address] [place] [address]   [phone]            Sep 06, 2017  1:00 PM CDT   Level 1 with  INFUSION CHAIR 7   [facility] ([facility])    [facility] [address]  [place] [address]   [phone]            Sep 06, 2017  1:30 PM CDT   Return Visit with Gretel Quinn MD   [facility] ([facility])    [facility] [place] [address]  [place] [address]   [phone]              Who to contact     If you have questions or need follow up information about today's clinic visit or your schedule please contact [facility] directly at [phone].  Normal or non-critical lab and imaging results will be communicated to you by MyChart, letter or phone within 4 business days after the clinic has received the results. If you do not hear from us within 7 days, please contact the clinic through MyChart or phone. If you have a critical or abnormal lab result, we will notify you by phone as soon as possible.  Submit refill requests through [employer] or call your pharmacy and they will forward the refill request to us. Please allow 3 business days for your refill to be  completed.          Additional Information About Your Visit        Care EveryWhere ID     This is your Care EveryWhere ID. This could be used by other organizations to access your [facility] medical records  [id number]        Your Vitals Were     Pulse Temperature Respirations BMI (Body Mass Index)          67 98  F (36.7  C) (Tympanic) 16 28.27 kg/m2         Blood Pressure from Last 3 Encounters:   08/07/17 120/60   07/26/17 121/53   07/26/17 121/53    Weight from Last 3 Encounters:   08/07/17 197 lb (89.4 kg)   07/26/17 199 lb 6.4 oz (90.4 kg)   07/26/17 199 lb 6.4 oz (90.4 kg)              Today, you had the following     No orders found for display       Primary Care Provider Office Phone # Fax #    Dickson Reich [phone]       [facility] [address] [facility] [address]        Equal Access to Services     ELIE GAGNON : Hadii aad ku hadasho Soomaali, waaxda luqadaha, qaybta kaalmada adeegyada, waxay idiin hayaan adeeg kharash la'sbn . So [facility] [phone].    ATENCIÓN: Si habla español, tiene a nolan disposición servicios gratuitos de asistencia lingüística. Llame al [phone].    We comply with applicable federal civil rights laws and [place] laws. We do not discriminate on the basis of race, color, national origin, age, disability sex, sexual orientation or gender identity.            Thank you!     Thank you for choosing [facility]  for your care. Our goal is always to provide you with excellent care. Hearing back from our patients is one way we can continue to improve our services. Please take a few minutes to complete the written survey that you may receive in the mail after your visit with us. Thank you!             Your Updated Medication List - Protect others around you: Learn how to safely use, store and throw away your medicines at www.disposemymeds.org.          This list is accurate as of: 8/7/17  3:05 PM.  Always use  your most recent med list.                   Brand Name Dispense Instructions for use Diagnosis    acyclovir 400 MG tablet    ZOVIRAX    60 tablet    Take 1 tablet (400 mg) by mouth 2 times daily Viral Prophylaxis.    Multiple myeloma not having achieved remission (H)       amLODIPine 5 MG tablet    NORVASC    30 tablet    TAKE 1 TABLET BY MOUTH DAILY    Essential hypertension, benign       * B-D U/F 31G X 8 MM   Generic drug:  insulin pen needle           * insulin pen needle 31G X 8 MM    B-D U/F    100 each    Use 5 pen needles daily or as directed.    Type 2 diabetes mellitus with stage 3 chronic kidney disease, with long-term current use of insulin (H)       cyclophosphamide 50 MG capsule CHEMO    CYTOXAN    24 capsule    Take 6 capsules (300 mg) by mouth once a week    Multiple myeloma not having achieved remission (H)       dexamethasone 4 MG tablet    DECADRON    40 tablet    Take 10 tablets (40 mg) by mouth every 7 days for 4 doses Take daily on Days 1, 8, 15, and 22.    Multiple myeloma not having achieved remission (H)       ferrous sulfate 325 (65 FE) MG tablet    IRON     Take 325 mg by mouth daily (with breakfast)        FREESTYLE LITE test strip   Generic drug:  blood glucose monitoring     300 strip    USE TO TEST FOUR TIMES DAILY    Uncontrolled type 1 diabetes mellitus with stage 3 chronic kidney disease (H)       furosemide 20 MG tablet    LASIX    30 tablet    Take 1 tablet (20 mg) by mouth daily    Multiple myeloma not having achieved remission (H)       gemfibrozil 600 MG tablet    LOPID    180 tablet    TAKE 1 TABLET BY MOUTH TWICE DAILY    Hyperlipidemia       glipiZIDE 10 MG tablet    GLUCOTROL    180 tablet    TAKE 1 TABLET BY MOUTH TWICE DAILY BEFORE A MEAL    Type 2 diabetes mellitus with stage 3 chronic kidney disease, with long-term current use of insulin (H)       insulin aspart 100 UNIT/ML injection    NovoLOG PEN    12 mL    Inject 4 Units Subcutaneous 3 times daily (with meals)  Before meal correction: 140-225 - add 1 unit  226-310 - add 2 units 311-395 - add 3 units 396-480 - add 4 units             >480 - add 5 units >481 - take 6 units Bedtime correction of: 200-299 give 1 unit 300-399 take 2 units 400 or greater take 3 units On Wed-Thurs after chemo, add an additional 2 units to sliding scale.    Uncontrolled type 2 diabetes mellitus with hyperglycemia, unspecified long term insulin use status (H)       insulin detemir 100 UNIT/ML injection    LEVEMIR FLEXPEN/FLEXTOUCH    15 mL    Inject 14 Units Subcutaneous every morning (before breakfast)    Type 2 diabetes mellitus with diabetic chronic kidney disease, unspecified CKD stage, unspecified long term insulin use status (H)       levothyroxine 25 MCG tablet    SYNTHROID/LEVOTHROID    90 tablet    TAKE 1 TABLET BY MOUTH EVERY DAY.    Hypothyroidism       lisinopril 30 MG tablet    PRINIVIL,ZESTRIL    90 tablet    TAKE 1 TABLET BY MOUTH EVERY DAY    Essential hypertension, benign       metoprolol 25 MG tablet    LOPRESSOR    180 tablet    TAKE 1 TABLET BY MOUTH TWICE DAILY    Essential hypertension       omeprazole 20 MG CR capsule    priLOSEC    90 capsule    TAKE 1 CAPSULE BY MOUTH EVERY DAY    Congenital hiatus hernia       ondansetron 8 MG tablet    ZOFRAN    10 tablet    Take 1 tablet (8 mg) by mouth every 8 hours as needed (Nausea/Vomiting)    Multiple myeloma not having achieved remission (H)       polyethylene glycol powder    MIRALAX/GLYCOLAX          prochlorperazine 5 MG tablet    COMPAZINE    30 tablet    Take 1 tablet (5 mg) by mouth every 6 hours as needed (Nausea/Vomiting)    Multiple myeloma not having achieved remission (H)       Selenium 200 MCG Tabs      Take 100 mcg by mouth daily. Pt takes one half tab of the 200 mcg daily        tamsulosin 0.4 MG capsule    FLOMAX    90 capsule    TAKE 1 CAPSULE BY MOUTH DAILY    Malignant neoplasm of prostate (H)       VITAMIN C PO      Take 1,000 mg by mouth daily        VITAMIN D3  PO      Take 1,000 Units by mouth daily        * Notice:  This list has 2 medication(s) that are the same as other medications prescribed for you. Read the directions carefully, and ask your doctor or other care provider to review them with you.

## 2017-08-07 NOTE — PROGRESS NOTES
SUBJECTIVE:                                                    Roc Parkinson is a 89 year old male who presents to clinic today for the following health issues:      Diabetes Follow-up    Patient is checking blood sugars: 4 times daily.    Blood sugar testing frequency justification: Uncontrolled diabetes  Results are as follows:       lunchtime - 160'8-490       suppertime - 130-HI       bedtime - 103-HI        Diabetic concerns: blood sugar frequently over 200     Symptoms of hypoglycemia (low blood sugar): none     Paresthesias (numbness or burning in feet) or sores: No   Date of last diabetic eye exam: utd      Amount of exercise or physical activity: None    Problems taking medications regularly: No    Medication side effects: none  Diet: regular (no restrictions)          Problem list and histories reviewed & adjusted, as indicated.  Additional history: The patient just had his insulin dosing both basal and at meals changed on Friday.  He has made neither of those changes coming into today because he wanted to talk to me about the changes.  I assured him that they looked very practical.  He will institute those starting tonight.  Patient Active Problem List   Diagnosis     Dysphagia     Malignant neoplasm of prostate (H)     Malignant neoplasm of bladder (H)     Essential hypertension, benign     Asymptomatic varicose veins     Congenital hiatus hernia     Oral lesion     CTS (carpal tunnel syndrome)     Irritable bowel syndrome     Vitamin D deficiency disease     Microalbuminuria     Obesity     UTI (urinary tract infection) w hx of recurrence     Iron deficiency anemia     Nonsmoker     A-fib (H)     Health Care Home     Hyperlipidemia     CKD (chronic kidney disease) stage 3, GFR 30-59 ml/min     Hypothyroidism     Long-term (current) use of anticoagulants [Z79.01]     Macrocytic anemia     GIB (gastrointestinal bleeding)     Multiple myeloma not having achieved remission (H)     Hypercalcemia      Hyperglycemia     Urinary tract infection     Hyperkalemia     ACP (advance care planning)     MDS (myelodysplastic syndrome) (H)     Chemotherapy-induced neutropenia (H)     Anemia in neoplastic disease     Type 2 diabetes mellitus with stage 3 chronic kidney disease, with long-term current use of insulin (H)     Past Surgical History:   Procedure Laterality Date     ARTHROPLASTY KNEE  11/5/2012    Procedure: ARTHROPLASTY KNEE;  RIGHT TOTAL KNEE ARTHROPLASTY (SMITH & NEPHEW)^;  Surgeon: Dickson Schulte MD;  Location: [facility] OR     BIOPSY      bladder     COLONOSCOPY  2007     COLONOSCOPY N/A 11/15/2016    Procedure: COMBINED COLONOSCOPY, SINGLE OR MULTIPLE BIOPSY/POLYPECTOMY BY BIOPSY;  Surgeon: Kenneth Fulton MD;  Location:  GI     GENITOURINARY SURGERY      TURP x2 and bladder scraping     GI SURGERY      anal fistula     ORTHOPEDIC SURGERY      lt knee in nov.2009     SOFT TISSUE SURGERY      melanoma       Social History   Substance Use Topics     Smoking status: Never Smoker     Smokeless tobacco: Never Used     Alcohol use No     Family History   Problem Relation Age of Onset     Cardiovascular Father 81     heart arrythmia     Endocrine Disease Brother 74     Paget's             Patient Active Problem List   Diagnosis     Dysphagia     Malignant neoplasm of prostate (H)     Malignant neoplasm of bladder (H)     Essential hypertension, benign     Asymptomatic varicose veins     Congenital hiatus hernia     Oral lesion     CTS (carpal tunnel syndrome)     Irritable bowel syndrome     Vitamin D deficiency disease     Microalbuminuria     Obesity     UTI (urinary tract infection) w hx of recurrence     Iron deficiency anemia     Nonsmoker     A-fib (H)     Health Care Home     Hyperlipidemia     CKD (chronic kidney disease) stage 3, GFR 30-59 ml/min     Hypothyroidism     Long-term (current) use of anticoagulants [Z79.01]     Macrocytic anemia     GIB (gastrointestinal bleeding)     Multiple myeloma not  having achieved remission (H)     Hypercalcemia     Hyperglycemia     Urinary tract infection     Hyperkalemia     ACP (advance care planning)     MDS (myelodysplastic syndrome) (H)     Chemotherapy-induced neutropenia (H)     Anemia in neoplastic disease     Type 2 diabetes mellitus with stage 3 chronic kidney disease, with long-term current use of insulin (H)     Past Surgical History:   Procedure Laterality Date     ARTHROPLASTY KNEE  11/5/2012    Procedure: ARTHROPLASTY KNEE;  RIGHT TOTAL KNEE ARTHROPLASTY (SMITH & NEPHEW)^;  Surgeon: Dickson Schulte MD;  Location: [facility] OR     BIOPSY      bladder     COLONOSCOPY  2007     COLONOSCOPY N/A 11/15/2016    Procedure: COMBINED COLONOSCOPY, SINGLE OR MULTIPLE BIOPSY/POLYPECTOMY BY BIOPSY;  Surgeon: Kenneth Fulton MD;  Location:  GI     GENITOURINARY SURGERY      TURP x2 and bladder scraping     GI SURGERY      anal fistula     ORTHOPEDIC SURGERY      lt knee in nov.2009     SOFT TISSUE SURGERY      melanoma       Social History   Substance Use Topics     Smoking status: Never Smoker     Smokeless tobacco: Never Used     Alcohol use No     Family History   Problem Relation Age of Onset     Cardiovascular Father 81     heart arrythmia     Endocrine Disease Brother 74     Paget's             Reviewed and updated as needed this visit by clinical staffTobacco  Allergies  Meds  Med Hx  Surg Hx  Fam Hx  Soc Hx      Reviewed and updated as needed this visit by Provider         ROS:  Constitutional, HEENT, cardiovascular, pulmonary, gi and gu systems are negative, except as otherwise noted.  CONSTITUTIONAL:NEGATIVE for fever, chills, change in weight  RESP:NEGATIVE for significant cough or SOB  CV: NEGATIVE for chest pain, palpitations or peripheral edema  ENDOCRINE: NEGATIVE for temperature intolerance, skin/hair changes, polydipsia, polyphagia and polyuria      OBJECTIVE:                                                    /60  Pulse 67  Temp 98  F  (36.7  C) (Tympanic)  Resp 16  Wt 197 lb (89.4 kg)  BMI 28.27 kg/m2  Body mass index is 28.27 kg/(m^2).  GENERAL APPEARANCE: healthy, alert and no distress         ASSESSMENT/PLAN:                                                        ICD-10-CM    1. Type 2 diabetes mellitus with stage 3 chronic kidney disease, with long-term current use of insulin (H) E11.22     N18.3     Z79.4    2. Multiple myeloma not having achieved remission (H) C90.00    3. CKD (chronic kidney disease) stage 3, GFR 30-59 ml/min N18.3        Patient Instructions   The patient's blood sugars were reviewed with him.  He has had an occasional blood sugar limits been down in the mid 90s.  Most have been between 1:30 and some into the mid 200s.  CDE note was reviewed.  I agree with the changes made to his insulins.  The patient and I discussed that today.  He has a follow-up appointment on August 29.  He will keep track of his blood sugars as he has been doing and call us next week with his results.  We can make further adjustments with his insulins at that point as necessary.  The patient is anxious to get his blood sugars down around 100.  He clearly is motivated to do this.      Dickson Reich MD  [facility]

## 2017-08-09 NOTE — MR AVS SNAPSHOT
After Visit Summary   8/9/2017    Roc Parkinson    MRN: [id number]           Patient Information     Date Of Birth          7/7/1926        Visit Information        Provider Department      8/9/2017 1:30 PM  INFUSION CHAIR 12 [facility]        Today's Diagnoses     Hypercalcemia    -  1    Multiple myeloma not having achieved remission (H)        Macrocytic anemia           Follow-ups after your visit        Your next 10 appointments already scheduled     Aug 15, 2017 10:45 AM CDT   SHORT with Dickson Reich MD   [facility] ([facility])    [address] [place] [address]   [phone]            Aug 16, 2017  1:00 PM CDT   Return Visit with  Oncology Nurse   [facility] ([facility])    [facility]  [address]  [place] [address]   [phone]            Aug 16, 2017  1:30 PM CDT   Level 1 with  INFUSION CHAIR 3   [facility] ([facility])    [facility]  [address]  [place] [address]   [phone]            Aug 16, 2017  1:45 PM CDT   Return Visit with Gretel Quinn MD   [facility] ([facility])    [facility]  [address]  [place] [address]   [phone]            Aug 29, 2017 10:30 AM CDT   Diabetic Education with  DIABETIC ED RESOURCE   [facility] ([facility])    [address] [place] [address]   [phone]            Sep 06, 2017  1:00 PM CDT   Level 1 with  INFUSION CHAIR 7   [facility] ([facility])    [facility]  [address]  [place] [address]   [phone]            Sep 06, 2017  1:30 PM  CDT   Return Visit with Gretel Quinn MD   [facility] ([facility])    [facility] [place]  [address] [place] [address] [phone]              Who to contact     If you have questions or need follow up information about today's clinic visit or your schedule please contact [facility] directly at [phone].  Normal or non-critical lab and imaging results will be communicated to you by MyChart, letter or phone within 4 business days after the clinic has received the results. If you do not hear from us within 7 days, please contact the clinic through MyChart or phone. If you have a critical or abnormal lab result, we will notify you by phone as soon as possible.  Submit refill requests through [employer] or call your pharmacy and they will forward the refill request to us. Please allow 3 business days for your refill to be completed.          Additional Information About Your Visit        Care EveryWhere ID     This is your Care EveryWhere ID. This could be used by other organizations to access your [place] medical records  [id number]        Your Vitals Were     Pulse Temperature Respirations             71 98.5  F (36.9  C) (Oral) 16          Blood Pressure from Last 3 Encounters:   08/09/17 147/68   08/07/17 120/60   07/26/17 121/53    Weight from Last 3 Encounters:   08/07/17 89.4 kg (197 lb)   07/26/17 90.4 kg (199 lb 6.4 oz)   07/26/17 90.4 kg (199 lb 6.4 oz)              We Performed the Following     Albumin level     Calcium     CBC with platelets differential     Creatinine        Primary Care Provider Office Phone # Fax #    Dickson Reich [phone] [address]  [facility] [address]        Equal Access to Services     ELIE GAGNON : Jeanne griffino Soaimee, waaxda luqadaha, qaybta kaalmada paddy, melita rodriguez. So [facility]  [phone].    ATENCIÓN: Si renaldo stephens, tiene a nolan disposición servicios gratuitos de asistencia lingüística. Eric fong [phone].    We comply with applicable federal civil rights laws and [place] laws. We do not discriminate on the basis of race, color, national origin, age, disability sex, sexual orientation or gender identity.            Thank you!     Thank you for choosing [facility]  for your care. Our goal is always to provide you with excellent care. Hearing back from our patients is one way we can continue to improve our services. Please take a few minutes to complete the written survey that you may receive in the mail after your visit with us. Thank you!             Your Updated Medication List - Protect others around you: Learn how to safely use, store and throw away your medicines at www.disposemymeds.org.          This list is accurate as of: 8/9/17  3:06 PM.  Always use your most recent med list.                   Brand Name Dispense Instructions for use Diagnosis    acyclovir 400 MG tablet    ZOVIRAX    60 tablet    Take 1 tablet (400 mg) by mouth 2 times daily Viral Prophylaxis.    Multiple myeloma not having achieved remission (H)       amLODIPine 5 MG tablet    NORVASC    30 tablet    TAKE 1 TABLET BY MOUTH DAILY    Essential hypertension, benign       * B-D U/F 31G X 8 MM   Generic drug:  insulin pen needle           * insulin pen needle 31G X 8 MM    B-D U/F    100 each    Use 5 pen needles daily or as directed.    Type 2 diabetes mellitus with stage 3 chronic kidney disease, with long-term current use of insulin (H)       cyclophosphamide 50 MG capsule CHEMO    CYTOXAN    24 capsule    Take 6 capsules (300 mg) by mouth once a week    Multiple myeloma not having achieved remission (H)       dexamethasone 4 MG tablet    DECADRON    40 tablet    Take 10 tablets (40 mg) by mouth every 7 days for 4 doses Take daily on Days 1, 8, 15, and 22.    Multiple  myeloma not having achieved remission (H)       ferrous sulfate 325 (65 FE) MG tablet    IRON     Take 325 mg by mouth daily (with breakfast)        FREESTYLE LITE test strip   Generic drug:  blood glucose monitoring     300 strip    USE TO TEST FOUR TIMES DAILY    Uncontrolled type 1 diabetes mellitus with stage 3 chronic kidney disease (H)       furosemide 20 MG tablet    LASIX    30 tablet    Take 1 tablet (20 mg) by mouth daily    Multiple myeloma not having achieved remission (H)       gemfibrozil 600 MG tablet    LOPID    180 tablet    TAKE 1 TABLET BY MOUTH TWICE DAILY    Hyperlipidemia       glipiZIDE 10 MG tablet    GLUCOTROL    180 tablet    TAKE 1 TABLET BY MOUTH TWICE DAILY BEFORE A MEAL    Type 2 diabetes mellitus with stage 3 chronic kidney disease, with long-term current use of insulin (H)       insulin aspart 100 UNIT/ML injection    NovoLOG PEN    12 mL    Inject 4 Units Subcutaneous 3 times daily (with meals) Before meal correction: 140-225 - add 1 unit  226-310 - add 2 units 311-395 - add 3 units 396-480 - add 4 units             >480 - add 5 units >481 - take 6 units Bedtime correction of: 200-299 give 1 unit 300-399 take 2 units 400 or greater take 3 units On Wed-Thurs after chemo, add an additional 2 units to sliding scale.    Uncontrolled type 2 diabetes mellitus with hyperglycemia, unspecified long term insulin use status (H)       insulin detemir 100 UNIT/ML injection    LEVEMIR FLEXPEN/FLEXTOUCH    15 mL    Inject 14 Units Subcutaneous every morning (before breakfast)    Type 2 diabetes mellitus with diabetic chronic kidney disease, unspecified CKD stage, unspecified long term insulin use status (H)       levothyroxine 25 MCG tablet    SYNTHROID/LEVOTHROID    90 tablet    TAKE 1 TABLET BY MOUTH EVERY DAY.    Acquired hypothyroidism       lisinopril 30 MG tablet    PRINIVIL,ZESTRIL    90 tablet    TAKE 1 TABLET BY MOUTH EVERY DAY    Essential hypertension, benign       metoprolol 25 MG  tablet    LOPRESSOR    180 tablet    TAKE 1 TABLET BY MOUTH TWICE DAILY    Essential hypertension       omeprazole 20 MG CR capsule    priLOSEC    90 capsule    TAKE 1 CAPSULE BY MOUTH EVERY DAY    Congenital hiatus hernia       ondansetron 8 MG tablet    ZOFRAN    10 tablet    Take 1 tablet (8 mg) by mouth every 8 hours as needed (Nausea/Vomiting)    Multiple myeloma not having achieved remission (H)       polyethylene glycol powder    MIRALAX/GLYCOLAX          prochlorperazine 5 MG tablet    COMPAZINE    30 tablet    Take 1 tablet (5 mg) by mouth every 6 hours as needed (Nausea/Vomiting)    Multiple myeloma not having achieved remission (H)       Selenium 200 MCG Tabs      Take 100 mcg by mouth daily. Pt takes one half tab of the 200 mcg daily        tamsulosin 0.4 MG capsule    FLOMAX    90 capsule    TAKE 1 CAPSULE BY MOUTH DAILY    Malignant neoplasm of prostate (H)       VITAMIN C PO      Take 1,000 mg by mouth daily        VITAMIN D3 PO      Take 1,000 Units by mouth daily        * Notice:  This list has 2 medication(s) that are the same as other medications prescribed for you. Read the directions carefully, and ask your doctor or other care provider to review them with you.

## 2017-08-09 NOTE — PROGRESS NOTES
Infusion Nursing Note:  Roc RICARDO Parkinson presents today for Zometa.    Patient seen by provider today: No   present during visit today: Not Applicable.    Note: N/A.    Intravenous Access:  Labs drawn without difficulty.   Peripheral IV placed.    Treatment Conditions:  Lab Results   Component Value Date    HGB 10.7 08/09/2017     Lab Results   Component Value Date    WBC 3.3 08/09/2017      Lab Results   Component Value Date    ANEU 3.0 08/09/2017     Lab Results   Component Value Date     08/09/2017      Lab Results   Component Value Date     07/26/2017                   Lab Results   Component Value Date    POTASSIUM 5.0 07/26/2017           No results found for: MAG         Lab Results   Component Value Date    CR 1.07 08/09/2017                   Lab Results   Component Value Date    MICHELLE 9.4 08/09/2017                Lab Results   Component Value Date    BILITOTAL 0.6 07/26/2017           Lab Results   Component Value Date    ALBUMIN 3.2 08/09/2017                    Lab Results   Component Value Date    ALT 17 07/26/2017           Lab Results   Component Value Date    AST 17 07/26/2017     Results reviewed, labs MET treatment parameters, ok to proceed with treatment.  Hgb 10.7; lab parameters not met for aranesp.         Post Infusion Assessment:  Patient tolerated infusion without incident.  Blood return noted pre and post infusion.  Site patent and intact, free from redness, edema or discomfort.  No evidence of extravasations.  Access discontinued per protocol.    Discharge Plan:   Patient and/or family verbalized understanding of discharge instructions and all questions answered.  Copy of AVS reviewed with patient and/or family.  Patient will return 8/15/17 for next appointment.  Patient discharged in stable condition accompanied by: self.  Departure Mode: Ambulatory.    Esthela Vargas RN

## 2017-08-15 NOTE — MR AVS SNAPSHOT
After Visit Summary   8/15/2017    Roc Parkinson    MRN: [id number]           Patient Information     Date Of Birth          7/7/1926        Visit Information        Provider Department      8/15/2017 10:45 AM Dickson Reich MD [facility]        Today's Diagnoses     Type 2 diabetes mellitus with stage 3 chronic kidney disease, with long-term current use of insulin (H)    -  1    CKD (chronic kidney disease) stage 3, GFR 30-59 ml/min        Essential hypertension, benign        Multiple myeloma not having achieved remission (H)          Care Instructions    Will see back after 9/28 for his physical. Continue with sliding scale coverage of blood sugars.          Follow-ups after your visit        Your next 10 appointments already scheduled     Aug 16, 2017  1:00 PM CDT   Return Visit with  Oncology Nurse   [facility] ([facility])    [facility]  [address]  [place] [address]   [phone]            Aug 16, 2017  1:30 PM CDT   Level 1 with  INFUSION CHAIR 3   [facility] ([facility])    [facility]  [address]  [place] [address]   [phone]            Aug 16, 2017  1:45 PM CDT   Return Visit with Gretel Quinn MD   [facility])    [facility]  [address]  [place] [address]   [phone]            Aug 29, 2017 10:30 AM CDT   Diabetic Education with  DIABETIC ED RESOURCE   [facility] ([facility])    [address] [address]   [phone]            Sep 06, 2017  1:00 PM CDT   Level 1 with  INFUSION CHAIR 7   [facility] ([facility])    [facility]  [address]  [place]  "[phone]            Sep 06, 2017  1:30 PM CDT   Return Visit with Gretel Quinn MD   [facility] ([facility])    [facility] [place]  [address] [place] [address] [phone]              Who to contact     If you have questions or need follow up information about today's clinic visit or your schedule please contact [facility] directly at [phone].  Normal or non-critical lab and imaging results will be communicated to you by MyChart, letter or phone within 4 business days after the clinic has received the results. If you do not hear from us within 7 days, please contact the clinic through MyChart or phone. If you have a critical or abnormal lab result, we will notify you by phone as soon as possible.  Submit refill requests through [employer] or call your pharmacy and they will forward the refill request to us. Please allow 3 business days for your refill to be completed.          Additional Information About Your Visit        Care EveryWhere ID     This is your Care EveryWhere ID. This could be used by other organizations to access your [place] medical records  [id number]        Your Vitals Were     Pulse Temperature Respirations Height BMI (Body Mass Index)       65 97.3  F (36.3  C) (Tympanic) 16 5' 10\" (1.778 m) 28.55 kg/m2        Blood Pressure from Last 3 Encounters:   08/15/17 130/70   08/09/17 147/68   08/07/17 120/60    Weight from Last 3 Encounters:   08/15/17 199 lb (90.3 kg)   08/07/17 197 lb (89.4 kg)   07/26/17 199 lb 6.4 oz (90.4 kg)              Today, you had the following     No orders found for display       Primary Care Provider Office Phone # Fax #    Dickson Reich [phone]       [address]  [facility] [address]        Equal Access to Services     ELIE GAGNON : Hadii virginia griffino Soaimee, waaxda luqadaha, qaybta kaalmada adeegyada, melita núñez " aramis vipinsadie candikeo latom ah. So [facility] [phone].    ATENCIÓN: Si nilela angelo, tiene a nolan disposición servicios gratuitos de asistencia lingüística. Eric fong [phone].    We comply with applicable federal civil rights laws and [place] laws. We do not discriminate on the basis of race, color, national origin, age, disability sex, sexual orientation or gender identity.            Thank you!     Thank you for choosing [facility]  for your care. Our goal is always to provide you with excellent care. Hearing back from our patients is one way we can continue to improve our services. Please take a few minutes to complete the written survey that you may receive in the mail after your visit with us. Thank you!             Your Updated Medication List - Protect others around you: Learn how to safely use, store and throw away your medicines at www.disposemymeds.org.          This list is accurate as of: 8/15/17  1:35 PM.  Always use your most recent med list.                   Brand Name Dispense Instructions for use Diagnosis    acyclovir 400 MG tablet    ZOVIRAX    60 tablet    Take 1 tablet (400 mg) by mouth 2 times daily Viral Prophylaxis.    Multiple myeloma not having achieved remission (H)       amLODIPine 5 MG tablet    NORVASC    30 tablet    TAKE 1 TABLET BY MOUTH DAILY    Essential hypertension, benign       * B-D U/F 31G X 8 MM   Generic drug:  insulin pen needle           * insulin pen needle 31G X 8 MM    B-D U/F    100 each    Use 5 pen needles daily or as directed.    Type 2 diabetes mellitus with stage 3 chronic kidney disease, with long-term current use of insulin (H)       cyclophosphamide 50 MG capsule CHEMO    CYTOXAN    24 capsule    Take 6 capsules (300 mg) by mouth once a week    Multiple myeloma not having achieved remission (H)       dexamethasone 4 MG tablet    DECADRON    40 tablet    Take 10 tablets (40 mg) by mouth every 7 days for 4 doses Take daily on  Days 1, 8, 15, and 22.    Multiple myeloma not having achieved remission (H)       ferrous sulfate 325 (65 FE) MG tablet    IRON     Take 325 mg by mouth daily (with breakfast)        FREESTYLE LITE test strip   Generic drug:  blood glucose monitoring     300 strip    USE TO TEST FOUR TIMES DAILY    Uncontrolled type 1 diabetes mellitus with stage 3 chronic kidney disease (H)       furosemide 20 MG tablet    LASIX    30 tablet    Take 1 tablet (20 mg) by mouth daily    Multiple myeloma not having achieved remission (H)       gemfibrozil 600 MG tablet    LOPID    180 tablet    TAKE 1 TABLET BY MOUTH TWICE DAILY    Hyperlipidemia       glipiZIDE 10 MG tablet    GLUCOTROL    180 tablet    TAKE 1 TABLET BY MOUTH TWICE DAILY BEFORE A MEAL    Type 2 diabetes mellitus with stage 3 chronic kidney disease, with long-term current use of insulin (H)       insulin aspart 100 UNIT/ML injection    NovoLOG PEN    12 mL    Inject 4 Units Subcutaneous 3 times daily (with meals) Before meal correction: 140-225 - add 1 unit  226-310 - add 2 units 311-395 - add 3 units 396-480 - add 4 units             >480 - add 5 units >481 - take 6 units Bedtime correction of: 200-299 give 1 unit 300-399 take 2 units 400 or greater take 3 units On Wed-Thurs after chemo, add an additional 2 units to sliding scale.    Uncontrolled type 2 diabetes mellitus with hyperglycemia, unspecified long term insulin use status (H)       insulin detemir 100 UNIT/ML injection    LEVEMIR FLEXPEN/FLEXTOUCH    15 mL    Inject 14 Units Subcutaneous every morning (before breakfast)    Type 2 diabetes mellitus with diabetic chronic kidney disease, unspecified CKD stage, unspecified long term insulin use status (H)       levothyroxine 25 MCG tablet    SYNTHROID/LEVOTHROID    90 tablet    TAKE 1 TABLET BY MOUTH EVERY DAY.    Acquired hypothyroidism       lisinopril 30 MG tablet    PRINIVIL,ZESTRIL    90 tablet    TAKE 1 TABLET BY MOUTH EVERY DAY    Essential hypertension,  benign       metoprolol 25 MG tablet    LOPRESSOR    180 tablet    TAKE 1 TABLET BY MOUTH TWICE DAILY    Essential hypertension       omeprazole 20 MG CR capsule    priLOSEC    90 capsule    TAKE 1 CAPSULE BY MOUTH EVERY DAY    Congenital hiatus hernia       ondansetron 8 MG tablet    ZOFRAN    10 tablet    Take 1 tablet (8 mg) by mouth every 8 hours as needed (Nausea/Vomiting)    Multiple myeloma not having achieved remission (H)       polyethylene glycol powder    MIRALAX/GLYCOLAX          prochlorperazine 5 MG tablet    COMPAZINE    30 tablet    Take 1 tablet (5 mg) by mouth every 6 hours as needed (Nausea/Vomiting)    Multiple myeloma not having achieved remission (H)       Selenium 200 MCG Tabs      Take 100 mcg by mouth daily. Pt takes one half tab of the 200 mcg daily        tamsulosin 0.4 MG capsule    FLOMAX    90 capsule    TAKE 1 CAPSULE BY MOUTH DAILY    Malignant neoplasm of prostate (H)       VITAMIN C PO      Take 1,000 mg by mouth daily        VITAMIN D3 PO      Take 1,000 Units by mouth daily        * Notice:  This list has 2 medication(s) that are the same as other medications prescribed for you. Read the directions carefully, and ask your doctor or other care provider to review them with you.

## 2017-08-15 NOTE — NURSING NOTE
"Chief Complaint   Patient presents with     Diabetes       Initial /70  Pulse 65  Temp 97.3  F (36.3  C) (Tympanic)  Resp 16  Ht 5' 10\" (1.778 m)  Wt 199 lb (90.3 kg)  BMI 28.55 kg/m2 Estimated body mass index is 28.55 kg/(m^2) as calculated from the following:    Height as of this encounter: 5' 10\" (1.778 m).    Weight as of this encounter: 199 lb (90.3 kg).  Medication Reconciliation: complete     Mariia Allen CMA      "

## 2017-08-15 NOTE — PROGRESS NOTES
SUBJECTIVE:                                                    Roc Parkinson is a 89 year old male who presents to clinic today for the following health issues:      Diabetes Follow-up    Patient is checking blood sugars: four times daily.    Blood sugar testing frequency justification: Adjustment of medication(s)  Results are as follows: 80'S-HI               Diabetic concerns: None     Symptoms of hypoglycemia (low blood sugar): weak, lethargy, sweaty     Paresthesias (numbness or burning in feet) or sores: No   Date of last diabetic eye exam: UTD      Amount of exercise or physical activity: None    Problems taking medications regularly: No    Medication side effects: none  Diet: regular (no restrictions)          Problem list and histories reviewed & adjusted, as indicated.  Additional history: as documented    Patient Active Problem List   Diagnosis     Dysphagia     Malignant neoplasm of prostate (H)     Malignant neoplasm of bladder (H)     Essential hypertension, benign     Asymptomatic varicose veins     Congenital hiatus hernia     Oral lesion     CTS (carpal tunnel syndrome)     Irritable bowel syndrome     Vitamin D deficiency disease     Microalbuminuria     Obesity     UTI (urinary tract infection) w hx of recurrence     Iron deficiency anemia     Nonsmoker     A-fib (H)     Health Care Home     Hyperlipidemia     CKD (chronic kidney disease) stage 3, GFR 30-59 ml/min     Hypothyroidism     Long-term (current) use of anticoagulants [Z79.01]     Macrocytic anemia     GIB (gastrointestinal bleeding)     Multiple myeloma not having achieved remission (H)     Hypercalcemia     Hyperglycemia     Urinary tract infection     Hyperkalemia     ACP (advance care planning)     MDS (myelodysplastic syndrome) (H)     Chemotherapy-induced neutropenia (H)     Anemia in neoplastic disease     Type 2 diabetes mellitus with stage 3 chronic kidney disease, with long-term current use of insulin (H)     Past Surgical  "History:   Procedure Laterality Date     ARTHROPLASTY KNEE  11/5/2012    Procedure: ARTHROPLASTY KNEE;  RIGHT TOTAL KNEE ARTHROPLASTY (SMITH & NEPHEW)^;  Surgeon: Dickson Schulte MD;  Location: [facility]     BIOPSY      bladder     COLONOSCOPY  2007     COLONOSCOPY N/A 11/15/2016    Procedure: COMBINED COLONOSCOPY, SINGLE OR MULTIPLE BIOPSY/POLYPECTOMY BY BIOPSY;  Surgeon: Kenneth Fulton MD;  Location:  GI     GENITOURINARY SURGERY      TURP x2 and bladder scraping     GI SURGERY      anal fistula     ORTHOPEDIC SURGERY      lt knee in nov.2009     SOFT TISSUE SURGERY      melanoma       Social History   Substance Use Topics     Smoking status: Never Smoker     Smokeless tobacco: Never Used     Alcohol use No     Family History   Problem Relation Age of Onset     Cardiovascular Father 81     heart arrythmia     Endocrine Disease Brother 74     Paget's             Reviewed and updated as needed this visit by clinical staffTobacco  Allergies  Meds  Med Hx  Surg Hx  Fam Hx  Soc Hx      Reviewed and updated as needed this visit by Provider         ROS:  Constitutional, HEENT, cardiovascular, pulmonary, gi and gu systems are negative, except as otherwise noted.  CONSTITUTIONAL:NEGATIVE for fever, chills, change in weight  RESP:NEGATIVE for significant cough or SOB  CV: NEGATIVE for chest pain, palpitations or peripheral edema  ENDOCRINE: NEGATIVE for temperature intolerance, skin/hair changes, polydipsia, polyphagia and polyuria      OBJECTIVE:                                                    /70  Pulse 65  Temp 97.3  F (36.3  C) (Tympanic)  Resp 16  Ht 5' 10\" (1.778 m)  Wt 199 lb (90.3 kg)  BMI 28.55 kg/m2  Body mass index is 28.55 kg/(m^2).  GENERAL APPEARANCE: healthy, alert and no distress         ASSESSMENT/PLAN:                                                        ICD-10-CM    1. Type 2 diabetes mellitus with stage 3 chronic kidney disease, with long-term current use of insulin (H) " E11.22     N18.3     Z79.4    2. CKD (chronic kidney disease) stage 3, GFR 30-59 ml/min N18.3    3. Essential hypertension, benign I10    4. Multiple myeloma not having achieved remission (H) C90.00        Patient Instructions   Will see back after 9/28 for his physical. Continue with sliding scale coverage of blood sugars.      Dickson Reich MD  [facility]

## 2017-08-16 NOTE — PROGRESS NOTES
Oral Chemotherapy Monitoring Program  Patient Follow Up    Primary Oncologist: Dr. Quinn  Primary Oncology Clinic: [facility]  Cancer Diagnosis: Multiple Myeloma     Therapy History:  Started 4/20/17     Drug Interaction Assessment: no new drug interactions     Lab Monitoring Plan  C1D1+   CMP, CBC C2D1+ Call, CMP, CBC C3D1+ Call, CMP, CBC C4D1+ Call, CMP, CBC C5D1+ Call, CMP, CBC C6D1+ Call, CMP, CBC   C1D8+    C2D8+    C3D8+    C4D8+    C5D8+    C6D8+      C1D15+ Call C2D15+    C3D15+    C4D15+    C5D15+    C6D15+      C1D22+    C2D22+    C3D22+    C4D22+    C5D22+    C6D22+            Subjective/Objective:  Roc Parkinson is a 89 year old male seen in clinic for a follow-up visit for oral chemotherapy.  Spoke with Roc in clinic today. He stated he take all 6 of his capsules every Wednesday. He denies fatigue, nausea, or any other side effects at this time and generally feels well. No issues or concerns reported.    ORAL CHEMOTHERAPY 4/12/2017 5/10/2017 6/14/2017   Drug Name Cytoxan (Cyclophsphamide) Cytoxan (Cyclophsphamide) Cytoxan (Cyclophsphamide)   Current Dosage 300mg 300mg 300mg   Current Schedule Weekly Weekly Weekly   Cycle Details Continuous Continuous Continuous   Start Date of Last Cycle - 4/20/2017 6/5/2017   Planned next cycle start date 4/19/2017 - 7/3/2017   Doses missed in last 2 weeks - 0 0   Adherence Assessment - Adherent Adherent   Adverse Effects - No AE identified during assessment No AE identified during assessment   Any new drug interactions? No No -   Is the dose as ordered appropriate for the patient? Yes Yes -   Is the patient currently in pain? - Assessed in last 30 days. -       Last PHQ-2 Score on record:   PHQ-2 ( [address] [facility]) 8/15/2017 8/7/2017   Q1: Little interest or pleasure in doing things 0 0   Q2: Feeling down, depressed or hopeless 0 0   PHQ-2 Score 0 0       Patient does not report depression symptoms.      Vitals:  BP:   BP Readings from Last 1 Encounters:   08/16/17  "139/68     Wt Readings from Last 1 Encounters:   08/16/17 90.7 kg (200 lb)     Estimated body surface area is 2.12 meters squared as calculated from the following:    Height as of 8/15/17: 1.778 m (5' 10\").    Weight as of an earlier encounter on 8/16/17: 90.7 kg (200 lb).    Labs:  Lab Results   Component Value Date     08/16/2017      Lab Results   Component Value Date    POTASSIUM 5.4 08/16/2017     Lab Results   Component Value Date    MICHELLE 9.2 08/16/2017     Lab Results   Component Value Date    ALBUMIN 3.1 08/16/2017     No results found for: MAG  No results found for: PHOS  Lab Results   Component Value Date    BUN 36 08/16/2017     Lab Results   Component Value Date    CR 1.07 08/16/2017       Lab Results   Component Value Date    AST 13 08/16/2017     Lab Results   Component Value Date    ALT 18 08/16/2017     Lab Results   Component Value Date    BILITOTAL 0.4 08/16/2017       Lab Results   Component Value Date    WBC 3.7 08/16/2017     Lab Results   Component Value Date    HGB 10.1 08/16/2017     Lab Results   Component Value Date     08/16/2017     Lab Results   Component Value Date    ANEU 3.3 08/16/2017         Assessment & Plan:  I spoke with patient today regarding cytoxan/dex. He reports doing well on therapy with no issues or concerns. We will continue the same course.    Follow-Up:  In 1 month    Refill Due:  By 8/30. He has 1 more dose for next week left.    Celia Damico, PharmD, BCPS, BCOP  Hematology/Oncology Clinical Pharmacist  [facility]  [email or web address]    "

## 2017-08-16 NOTE — PROGRESS NOTES
Medical Assistant Note:  Roc Parkinson presents today for labs.    Patient seen by provider today: Yes: Thea.   present during visit today: Not Applicable.    Concerns: No Concerns.    Procedure:  Lab draw site: Right hand, Needle type: bf, Gauge: 23.    Post Assessment:  Labs drawn without difficulty: No we attempted twice and success upon second try.  Pt tolerated procedure well.  Discharge Plan:  Gauze and coban applied, band aid given for the rd.    Face to Face Time: 8min.    Yolanda Marsh MA

## 2017-08-16 NOTE — MR AVS SNAPSHOT
After Visit Summary   8/16/2017    Roc Parkinson    MRN: [id number]           Patient Information     Date Of Birth          7/7/1926        Visit Information        Provider Department      8/16/2017 1:00 PM Nurse,  Oncology [facility]        Today's Diagnoses     Multiple myeloma not having achieved remission (H)        Macrocytic anemia           Follow-ups after your visit        Your next 10 appointments already scheduled     Aug 29, 2017 10:30 AM CDT   Diabetic Education with  DIABETIC ED RESOURCE   [facility]    [address] [address]   [phone]            Sep 06, 2017  1:00 PM CDT   Level 1 with  INFUSION CHAIR 7   [facility] ([facility]    [facility]  [address]  [facility] [address]   [phone]            Sep 06, 2017  1:30 PM CDT   Return Visit with Gretel Quinn MD   [facility]    [facility]  [address]  [facility] [address]   [phone]              Future tests that were ordered for you today     Open Future Orders        Priority Expected Expires Ordered    Protein electrophoresis Routine 9/27/2017 5/16/2018 8/16/2017    Kappa and lambda light chain Routine 9/27/2017 5/16/2018 8/16/2017    Protein Immunofixation Serum Routine 9/27/2017 5/16/2018 8/16/2017            Who to contact     If you have questions or need follow up information about today's clinic visit or your schedule please contact [facility] directly at [phone].  Normal or non-critical lab and imaging results will be communicated to you by MyChart, letter or phone within 4 business days after the clinic has received the results. If you do not hear from us within 7 days, please contact the clinic through MyChart or phone. If you have  a critical or abnormal lab result, we will notify you by phone as soon as possible.  Submit refill requests through [employer] or call your pharmacy and they will forward the refill request to us. Please allow 3 business days for your refill to be completed.          Additional Information About Your Visit        Care EveryWhere ID     This is your Care EveryWhere ID. This could be used by other organizations to access your [facility] medical records  [id number]         Blood Pressure from Last 3 Encounters:   08/16/17 139/68   08/15/17 130/70   08/09/17 147/68    Weight from Last 3 Encounters:   08/16/17 90.7 kg (200 lb)   08/15/17 90.3 kg (199 lb)   08/07/17 89.4 kg (197 lb)              We Performed the Following     CBC with platelets differential     Comprehensive metabolic panel        Primary Care Provider Office Phone # Fax #    Dickson Reich [phone] [address] [facility] [address]        Equal Access to Services     [facility] : Hadii aad ku hadasho Soomaali, waaxda luqadaha, qaybta kaalmada adeegyada, waxay idiin hayaan adeeg kavon shaw . So [facility] [phone].    ATENCIÓN: Si habla español, tiene a nolan disposición servicios gratuitos de asistencia lingüística. Ll[facility] [phone].    We comply with applicable federal civil rights laws and [place] laws. We do not discriminate on the basis of race, color, national origin, age, disability sex, sexual orientation or gender identity.            Thank you!     Thank you for choosing [facility]  for your care. Our goal is always to provide you with excellent care. Hearing back from our patients is one way we can continue to improve our services. Please take a few minutes to complete the written survey that you may receive in the mail after your visit with us. Thank you!             Your Updated Medication List - Protect others around you: Learn how to safely use, store and throw away your medicines  at www.disposemymeds.org.          This list is accurate as of: 8/16/17  1:51 PM.  Always use your most recent med list.                   Brand Name Dispense Instructions for use Diagnosis    acyclovir 400 MG tablet    ZOVIRAX    60 tablet    Take 1 tablet (400 mg) by mouth 2 times daily Viral Prophylaxis.    Multiple myeloma not having achieved remission (H)       amLODIPine 5 MG tablet    NORVASC    30 tablet    TAKE 1 TABLET BY MOUTH DAILY    Essential hypertension, benign       * B-D U/F 31G X 8 MM   Generic drug:  insulin pen needle           * insulin pen needle 31G X 8 MM    B-D U/F    100 each    Use 5 pen needles daily or as directed.    Type 2 diabetes mellitus with stage 3 chronic kidney disease, with long-term current use of insulin (H)       cyclophosphamide 50 MG capsule CHEMO    CYTOXAN    24 capsule    Take 6 capsules (300 mg) by mouth once a week    Multiple myeloma not having achieved remission (H)       dexamethasone 4 MG tablet    DECADRON    40 tablet    Take 10 tablets (40 mg) by mouth every 7 days for 4 doses Take daily on Days 1, 8, 15, and 22.    Multiple myeloma not having achieved remission (H)       ferrous sulfate 325 (65 FE) MG tablet    IRON     Take 325 mg by mouth daily (with breakfast)        FREESTYLE LITE test strip   Generic drug:  blood glucose monitoring     300 strip    USE TO TEST FOUR TIMES DAILY    Uncontrolled type 1 diabetes mellitus with stage 3 chronic kidney disease (H)       furosemide 20 MG tablet    LASIX    30 tablet    Take 1 tablet (20 mg) by mouth daily    Multiple myeloma not having achieved remission (H)       gemfibrozil 600 MG tablet    LOPID    180 tablet    TAKE 1 TABLET BY MOUTH TWICE DAILY    Hyperlipidemia       glipiZIDE 10 MG tablet    GLUCOTROL    180 tablet    TAKE 1 TABLET BY MOUTH TWICE DAILY BEFORE A MEAL    Type 2 diabetes mellitus with stage 3 chronic kidney disease, with long-term current use of insulin (H)       insulin aspart 100 UNIT/ML  injection    NovoLOG PEN    12 mL    Inject 4 Units Subcutaneous 3 times daily (with meals) Before meal correction: 140-225 - add 1 unit  226-310 - add 2 units 311-395 - add 3 units 396-480 - add 4 units             >480 - add 5 units >481 - take 6 units Bedtime correction of: 200-299 give 1 unit 300-399 take 2 units 400 or greater take 3 units On Wed-Thurs after chemo, add an additional 2 units to sliding scale.    Uncontrolled type 2 diabetes mellitus with hyperglycemia, unspecified long term insulin use status (H)       insulin detemir 100 UNIT/ML injection    LEVEMIR FLEXPEN/FLEXTOUCH    15 mL    Inject 14 Units Subcutaneous every morning (before breakfast)    Type 2 diabetes mellitus with diabetic chronic kidney disease, unspecified CKD stage, unspecified long term insulin use status (H)       levothyroxine 25 MCG tablet    SYNTHROID/LEVOTHROID    90 tablet    TAKE 1 TABLET BY MOUTH EVERY DAY.    Acquired hypothyroidism       lisinopril 30 MG tablet    PRINIVIL,ZESTRIL    90 tablet    TAKE 1 TABLET BY MOUTH EVERY DAY    Essential hypertension, benign       metoprolol 25 MG tablet    LOPRESSOR    180 tablet    TAKE 1 TABLET BY MOUTH TWICE DAILY    Essential hypertension       omeprazole 20 MG CR capsule    priLOSEC    90 capsule    TAKE 1 CAPSULE BY MOUTH EVERY DAY    Congenital hiatus hernia       ondansetron 8 MG tablet    ZOFRAN    10 tablet    Take 1 tablet (8 mg) by mouth every 8 hours as needed (Nausea/Vomiting)    Multiple myeloma not having achieved remission (H)       polyethylene glycol powder    MIRALAX/GLYCOLAX          prochlorperazine 5 MG tablet    COMPAZINE    30 tablet    Take 1 tablet (5 mg) by mouth every 6 hours as needed (Nausea/Vomiting)    Multiple myeloma not having achieved remission (H)       Selenium 200 MCG Tabs      Take 100 mcg by mouth daily. Pt takes one half tab of the 200 mcg daily        tamsulosin 0.4 MG capsule    FLOMAX    90 capsule    TAKE 1 CAPSULE BY MOUTH DAILY     Malignant neoplasm of prostate (H)       VITAMIN C PO      Take 1,000 mg by mouth daily        VITAMIN D3 PO      Take 1,000 Units by mouth daily        * Notice:  This list has 2 medication(s) that are the same as other medications prescribed for you. Read the directions carefully, and ask your doctor or other care provider to review them with you.

## 2017-08-16 NOTE — PATIENT INSTRUCTIONS
1.  Continue cyclophosphamide, dexamethasone   2.  Zometa every six weeks  3. Continue Aranesp 300 mcg SQ every three weeks  Scheduled/Janice  4- RTC MD 6 weeks with SPEP, IFXN, light chains  Scheduled/Janice  5- Discontinue acyclovir  6- Schedule a follow up with primary care physician for right hand pain.       AVS printed & given to patient/Janice

## 2017-08-16 NOTE — PROGRESS NOTES
"Oncology Rooming Note    August 16, 2017 1:36 PM   Roc Parkinson is a 89 year old male who presents for:    Chief Complaint   Patient presents with     Oncology Clinic Visit     Initial Vitals: /68 (BP Location: Left arm, Patient Position: Chair, Cuff Size: Adult Regular)  Pulse 82  Temp 98.1  F (36.7  C) (Oral)  Wt 90.7 kg (200 lb)  SpO2 99%  BMI 28.7 kg/m2 Estimated body mass index is 28.7 kg/(m^2) as calculated from the following:    Height as of 8/15/17: 1.778 m (5' 10\").    Weight as of this encounter: 90.7 kg (200 lb). Body surface area is 2.12 meters squared.  No Pain (0) Comment: Data Unavailable   No LMP for male patient.  Allergies reviewed: Yes  Medications reviewed: Yes    Medications: Medication refills not needed today.  Pharmacy name entered into REach:    [facility] [address] [place] [address] [address] AT [facility] [address] PHARMACY [facility] [address]    Clinical concerns: None     5 minutes for nursing intake (face to face time)     Diana Mosley CMA    DISCHARGE PLAN:  Next appointments: See patient instruction section-- Lavinia  Departure Mode: Ambulatory  Accompanied by: Self  5 minutes for nursing discharge (face to face time)   Bere Pace RN    1.  Continue po cyclophosphamide, dexamethasone   2.  Zometa every six weeks  3. Continue Aranesp 300 mcg SQ every three weeks  4- RTC MD 6 weeks with SPEP, IFXN, light chains  5- Discontinue acyclovir--Pt verbalized understanding.  6- Schedule a follow up with primary care physician for right hand pain.--Pt verbalized understanding.         9/6/2017 Wed  1:00 PM  1:00 P 60 [facility] [[id number]]  INFUSION CHAIR 7 [[id number]] LEVEL 1 [663] Zometa/Possible Aranesp      S 9/6/2017 Wed  1:30 PM  1:30 P 15 [facility] [[id number]] ALISON JON [[id number]] RETURN [657] Follow up - Multiple Myeloma      S 9/27/2017 Wed 10:00 AM 10:00 A 60 [facility] [[id number]] SH INFUSION CHAIR 15 [[id number]] LEVEL 1 " [663] Lab Draw, Possible Aranesp       9/27/2017 Wed 10:30 AM 10:30 A 15 [facility] [[id number]] ALISON JON [[id number]] RETURN [657] Follow up - Multiple Myeloma      S 10/4/2017 Wed 11:00 AM 11:00 A 60 [facility] [[id number]]  INFUSION CHAIR 3 [[id number]] LEVEL 1 [663] Lab Draw, Zometa

## 2017-08-16 NOTE — PROGRESS NOTES
[facility] PHYSICIANS  HEMATOLOGY ONCOLOGY     DIAGNOSIS:    1- Kappa light chain IgM multiple myeloma in a 89-year-old patient.  Bone marrow biopsy on 11/17/2016 showed hypercellular bone marrow with 65% cellularity of light chain restricted plasma cells.  FISH or G-banding could not be performed due to insufficient sample.   There is a background of myelodysplastic syndrome.  The patient was initially seen in the hospital on 11/17/2016 for macrocytic anemia and pancytopenia and transfusion requirement and a bone marrow biopsy was performed.   2- History of prostate cancer s/p EBRT s/p brachytherapy in 2003 (recent PSA 0.10), superficial bladder cancer ,no recurrences since 1986     TREATMENT: 12/15/16 velcade/dex. 4/12/17 added cyclophosphamide 300 mg weekly.6/28/17 we discontinued velcade due to concern for neuropathy and continued with weekly cyclophosphamide and dexamethasone.      SUBJECTIVE:  The patient was seen as a followup today. He is tolerating treatment well. No new complaints.     REVIEW OF SYSTEMS:  A complete review of systems was performed and found to be negative other than pertinent positives mentioned in history of present illness.     Past medical, social histories reviewed.    Meds- Reviewed.     PHYSICAL EXAMINATION:   VITAL SIGNS:/68 (BP Location: Left arm, Patient Position: Chair, Cuff Size: Adult Regular)  Pulse 82  Temp 98.1  F (36.7  C) (Oral)  Wt 90.7 kg (200 lb)  SpO2 99%  BMI 28.7 kg/m2  CONSTITUTIONAL: Sitting comfortably.   HEENT: Pupils are equal. Oropharynx is clear.   NECK: No cervical or supraclavicular lymphadenopathy.   RESPIRATORY: Clear bilaterally.   CARD/VASC: S1, S2, regular.   GI: Soft, nontender, nondistended, no hepatosplenomegaly.   MUSKULOSKELETAL: Warm, well perfused.   NEUROLOGIC: Alert, awake.   INTEGUMENT: No rash.   LYMPHATICS: Trace edema.   PSYCH: Mood and affect was normal.     LABORATORY DATA AND IMAGING REVIEWED DURING THIS  VISIT:  Recent Labs   Lab Test  08/09/17   1320  07/26/17   1010  07/05/17   0835   NA   --   137  137   POTASSIUM   --   5.0  4.6   CHLORIDE   --   105  105   CO2   --   22  22   ANIONGAP   --   10  10   BUN   --   26  39*   CR  1.07  0.98  1.22   GLC   --   263*  262*   MICHELLE  9.4  9.6  9.4     Recent Labs   Lab Test  08/16/17   1310  08/09/17   1320  07/26/17   1010   WBC  3.7*  3.3*  3.3*   HGB  10.1*  10.7*  9.4*   PLT  125*  116*  114*   MCV  112*  112*  110*   NEUTROPHIL  90.3  89.8  88.2     Recent Labs   Lab Test  08/09/17   1320  07/26/17   1010  07/05/17   0835  06/21/17   0915   11/17/16   0938   BILITOTAL   --   0.6  0.4  0.5   < >   --    ALKPHOS   --   64  67  81   < >   --    ALT   --   17  14  15   < >   --    AST   --   17  11  12   < >   --    ALBUMIN  3.2*  3.0*  2.9*  2.9*   < >   --    LDH   --    --    --    --    --   110    < > = values in this interval not displayed.   Results for JACOB MEDELLIN (MRN [id number]) as of 8/16/2017 13:36   Ref. Range 5/10/2017 13:30 6/7/2017 10:25 7/26/2017 11:31   Gamma Fraction Latest Ref Range: 0.7 - 1.6 g/dL 3.3 (H) 2.8 (H) 1.9 (H)   IGA Latest Ref Range: 70 - 380 mg/dL  18 (L) 20 (L)   IGG Latest Ref Range: 695 - 1620 mg/dL  352 (L) 341 (L)   IGM Latest Ref Range: 60 - 265 mg/dL  3900 (H) 2460 (H)   Immunofixation ELP Unknown  (Note)... (Note)...   Kappa Free Lt Chain Latest Ref Range: 0.33 - 1.94 mg/dL 98.75 (H) 91.25 (H) 48.25 (H)   Kappa Lambda Ratio Latest Ref Range: 0.26 - 1.65  182.87 (H) 138.26 (H) 51.88 (H)   Lambda Free Lt Chain Latest Ref Range: 0.57 - 2.63 mg/dL 0.54 (L) 0.66 0.93   Monoclonal Peak Latest Ref Range: 0.0 g/dL 3.0 (H) 2.5 (H) 1.6 (H)       ECOG PS: 1    ASSESSMENT:  This is a 89-year-old gentleman who has kappa light chain multiple myeloma with hypercellular marrow with 65% of cells are light chain restricted plasma cells.  He had severe pancytopenia and has needed a transfusion in the hospital.  He did not have hypercalcemia  and his renal function was normal. He has a background of myelodysplastic syndrome on his bone marrow biopsy; however, majority of the cell population was monoclonal plasma cell population.   He was started on treatment due to cytopenia.   In 4/2017 his light chain levels were getting worse. M spike was stable. We added cyclophosphamide to the regimen. In 6/2017 discontinued velcade for concern of development of neuropathy, in hindsight the pain appears to be related to arthritis.     - Labs were reviewed with the patient, Hb is 10.1 and stable. Thrombocytopenia at 125K. Leukopenia.   M spike is 1.6, kappa and lambda light chains are showing a good response as well.   - He is on Aranesp 300 mcg SQ every three weeks.  - Right hand pain appears to be related to arthritis vs carpel tunnel.     PLAN:   1.  Continue cyclophosphamide, dexamethasone   2.  Zometa every six weeks  3. Continue Aranesp 300 mcg SQ every three weeks  4- RTC MD 6 weeks with SPEP, IFXN, light chains  5- Disontinue acyclovir  6- Schedule a follow up with primary care physician for right hand pain.     ALISON JON MD    8/16/2017

## 2017-08-16 NOTE — MR AVS SNAPSHOT
After Visit Summary   8/16/2017    Roc Parkinson    MRN: [id number]           Patient Information     Date Of Birth          7/7/1926        Visit Information        Provider Department      8/16/2017 1:45 PM Gretel Quinn MD [facility]        Today's Diagnoses     Multiple myeloma not having achieved remission (H)    -  1      Care Instructions    1.  Continue cyclophosphamide, dexamethasone   2.  Zometa every six weeks  3. Continue Aranesp 300 mcg SQ every three weeks  4- RTC MD 6 weeks with SPEP, IFXN, light chains  5- Discontinue acyclovir  6- Schedule a follow up with primary care physician for right hand pain.           Follow-ups after your visit        Your next 10 appointments already scheduled     Aug 29, 2017 10:30 AM CDT   Diabetic Education with  DIABETIC ED RESOURCE   [facility] ([facility])    [address] [place] [address]   [phone]            Sep 06, 2017  1:00 PM CDT   Level 1 with  INFUSION CHAIR 7   [facility] ([facility])    [facility]  [address]  [place] [address]   [phone]            Sep 06, 2017  1:30 PM CDT   Return Visit with Gretel Quinn MD   [facility] ([facility])    [facility]  [address]  [place] [address]   [phone]            Sep 27, 2017 10:00 AM CDT   Level 1 with  INFUSION CHAIR 15   [facility] ([facility])    [facility]  [address]  [place] [address]   [phone]            Sep 27, 2017 10:30 AM CDT   Return Visit with Gretel Quinn MD   [facility] ([facility])    [facility]  [address]  [place] [address]   [phone]            Oct 04, 2017 11:00 AM CDT    Level 1 with  INFUSION CHAIR 3   [facility] ([facility])    [facility] [place]  [address]  [place] [address] [phone]              Future tests that were ordered for you today     Open Future Orders        Priority Expected Expires Ordered    Protein electrophoresis Routine 9/27/2017 5/16/2018 8/16/2017    Kappa and lambda light chain Routine 9/27/2017 5/16/2018 8/16/2017    Protein Immunofixation Serum Routine 9/27/2017 5/16/2018 8/16/2017            Who to contact     If you have questions or need follow up information about today's clinic visit or your schedule please contact [facility] directly at [phone].  Normal or non-critical lab and imaging results will be communicated to you by MyChart, letter or phone within 4 business days after the clinic has received the results. If you do not hear from us within 7 days, please contact the clinic through MyChart or phone. If you have a critical or abnormal lab result, we will notify you by phone as soon as possible.  Submit refill requests through [employer] or call your pharmacy and they will forward the refill request to us. Please allow 3 business days for your refill to be completed.          Additional Information About Your Visit        Care EveryWhere ID     This is your Care EveryWhere ID. This could be used by other organizations to access your [facility] medical records  [id number]        Your Vitals Were     Pulse Temperature Pulse Oximetry BMI (Body Mass Index)          82 98.1  F (36.7  C) (Oral) 99% 28.7 kg/m2         Blood Pressure from Last 3 Encounters:   08/16/17 139/68   08/15/17 130/70   08/09/17 147/68    Weight from Last 3 Encounters:   08/16/17 90.7 kg (200 lb)   08/15/17 90.3 kg (199 lb)   08/07/17 89.4 kg (197 lb)                 Today's Medication Changes          These changes are accurate as of: 8/16/17  2:28 PM.  If you have any  questions, ask your nurse or doctor.               These medicines have changed or have updated prescriptions.        Dose/Directions    * cyclophosphamide 50 MG capsule CHEMO   Commonly known as:  CYTOXAN   This may have changed:  Another medication with the same name was added. Make sure you understand how and when to take each.   Used for:  Multiple myeloma not having achieved remission (H)   Changed by:  Gretel Quinn MD        Dose:  300 mg   Take 6 capsules (300 mg) by mouth once a week   Quantity:  24 capsule   Refills:  0       * cyclophosphamide 50 MG capsule CHEMO   Commonly known as:  CYTOXAN   This may have changed:  You were already taking a medication with the same name, and this prescription was added. Make sure you understand how and when to take each.   Used for:  Multiple myeloma not having achieved remission (H)   Changed by:  Celia Damico RPH        Dose:  300 mg   Take 6 capsules (300 mg) by mouth once a week   Quantity:  24 capsule   Refills:  0       * dexamethasone 4 MG tablet   Commonly known as:  DECADRON   This may have changed:  Another medication with the same name was added. Make sure you understand how and when to take each.   Used for:  Multiple myeloma not having achieved remission (H)   Changed by:  Gretel Quinn MD        Dose:  40 mg   Take 10 tablets (40 mg) by mouth every 7 days for 4 doses Take daily on Days 1, 8, 15, and 22.   Quantity:  40 tablet   Refills:  0       * dexamethasone 4 MG tablet   Commonly known as:  DECADRON   This may have changed:  You were already taking a medication with the same name, and this prescription was added. Make sure you understand how and when to take each.   Used for:  Multiple myeloma not having achieved remission (H)   Changed by:  Celia Damico RPH        Dose:  40 mg   Take 10 tablets (40 mg) by mouth every 7 days for 4 doses Take daily on Days 1, 8, 15, and 22.   Quantity:  40 tablet   Refills:  0       * Notice:  This list has 4  medication(s) that are the same as other medications prescribed for you. Read the directions carefully, and ask your doctor or other care provider to review them with you.         Where to get your medicines      These medications were sent to [facility] [place], [place] - [address]  [address] [place] [address]     Phone:  [phone]     cyclophosphamide 50 MG capsule CHEMO    dexamethasone 4 MG tablet                Primary Care Provider Office Phone # Fax #    Dickson Reich [phone]  [facility] [address]        Equal Access to Services     [facility]: Hadii aad steve hadasho Soaimee, waaxda lunealadaha, qaybta kaalmada constantineyahanh, melita shaw . So [facility] [phone].    ATENCIÓN: Si habla español, tiene a nolan disposición servicios gratuitos de asistencia lingüística. [facility] [phone].    We comply with applicable federal civil rights laws and [place] laws. We do not discriminate on the basis of race, color, national origin, age, disability sex, sexual orientation or gender identity.            Thank you!     Thank you for choosing [facility]  for your care. Our goal is always to provide you with excellent care. Hearing back from our patients is one way we can continue to improve our services. Please take a few minutes to complete the written survey that you may receive in the mail after your visit with us. Thank you!             Your Updated Medication List - Protect others around you: Learn how to safely use, store and throw away your medicines at www.disposemymeds.org.          This list is accurate as of: 8/16/17  2:28 PM.  Always use your most recent med list.                   Brand Name Dispense Instructions for use Diagnosis    acyclovir 400 MG tablet    ZOVIRAX    60 tablet    Take 1 tablet (400 mg) by mouth 2 times daily Viral Prophylaxis.    Multiple myeloma not having  achieved remission (H)       amLODIPine 5 MG tablet    NORVASC    30 tablet    TAKE 1 TABLET BY MOUTH DAILY    Essential hypertension, benign       * B-D U/F 31G X 8 MM   Generic drug:  insulin pen needle           * insulin pen needle 31G X 8 MM    B-D U/F    100 each    Use 5 pen needles daily or as directed.    Type 2 diabetes mellitus with stage 3 chronic kidney disease, with long-term current use of insulin (H)       * cyclophosphamide 50 MG capsule CHEMO    CYTOXAN    24 capsule    Take 6 capsules (300 mg) by mouth once a week    Multiple myeloma not having achieved remission (H)       * cyclophosphamide 50 MG capsule CHEMO    CYTOXAN    24 capsule    Take 6 capsules (300 mg) by mouth once a week    Multiple myeloma not having achieved remission (H)       * dexamethasone 4 MG tablet    DECADRON    40 tablet    Take 10 tablets (40 mg) by mouth every 7 days for 4 doses Take daily on Days 1, 8, 15, and 22.    Multiple myeloma not having achieved remission (H)       * dexamethasone 4 MG tablet    DECADRON    40 tablet    Take 10 tablets (40 mg) by mouth every 7 days for 4 doses Take daily on Days 1, 8, 15, and 22.    Multiple myeloma not having achieved remission (H)       ferrous sulfate 325 (65 FE) MG tablet    IRON     Take 325 mg by mouth daily (with breakfast)        FREESTYLE LITE test strip   Generic drug:  blood glucose monitoring     300 strip    USE TO TEST FOUR TIMES DAILY    Uncontrolled type 1 diabetes mellitus with stage 3 chronic kidney disease (H)       furosemide 20 MG tablet    LASIX    30 tablet    Take 1 tablet (20 mg) by mouth daily    Multiple myeloma not having achieved remission (H)       gemfibrozil 600 MG tablet    LOPID    180 tablet    TAKE 1 TABLET BY MOUTH TWICE DAILY    Hyperlipidemia       glipiZIDE 10 MG tablet    GLUCOTROL    180 tablet    TAKE 1 TABLET BY MOUTH TWICE DAILY BEFORE A MEAL    Type 2 diabetes mellitus with stage 3 chronic kidney disease, with long-term current use of  insulin (H)       insulin aspart 100 UNIT/ML injection    NovoLOG PEN    12 mL    Inject 4 Units Subcutaneous 3 times daily (with meals) Before meal correction: 140-225 - add 1 unit  226-310 - add 2 units 311-395 - add 3 units 396-480 - add 4 units             >480 - add 5 units >481 - take 6 units Bedtime correction of: 200-299 give 1 unit 300-399 take 2 units 400 or greater take 3 units On Wed-Thurs after chemo, add an additional 2 units to sliding scale.    Uncontrolled type 2 diabetes mellitus with hyperglycemia, unspecified long term insulin use status (H)       insulin detemir 100 UNIT/ML injection    LEVEMIR FLEXPEN/FLEXTOUCH    15 mL    Inject 14 Units Subcutaneous every morning (before breakfast)    Type 2 diabetes mellitus with diabetic chronic kidney disease, unspecified CKD stage, unspecified long term insulin use status (H)       levothyroxine 25 MCG tablet    SYNTHROID/LEVOTHROID    90 tablet    TAKE 1 TABLET BY MOUTH EVERY DAY.    Acquired hypothyroidism       lisinopril 30 MG tablet    PRINIVIL,ZESTRIL    90 tablet    TAKE 1 TABLET BY MOUTH EVERY DAY    Essential hypertension, benign       metoprolol 25 MG tablet    LOPRESSOR    180 tablet    TAKE 1 TABLET BY MOUTH TWICE DAILY    Essential hypertension       omeprazole 20 MG CR capsule    priLOSEC    90 capsule    TAKE 1 CAPSULE BY MOUTH EVERY DAY    Congenital hiatus hernia       ondansetron 8 MG tablet    ZOFRAN    10 tablet    Take 1 tablet (8 mg) by mouth every 8 hours as needed (Nausea/Vomiting)    Multiple myeloma not having achieved remission (H)       polyethylene glycol powder    MIRALAX/GLYCOLAX          prochlorperazine 5 MG tablet    COMPAZINE    30 tablet    Take 1 tablet (5 mg) by mouth every 6 hours as needed (Nausea/Vomiting)    Multiple myeloma not having achieved remission (H)       Selenium 200 MCG Tabs      Take 100 mcg by mouth daily. Pt takes one half tab of the 200 mcg daily        tamsulosin 0.4 MG capsule    FLOMAX    90  capsule    TAKE 1 CAPSULE BY MOUTH DAILY    Malignant neoplasm of prostate (H)       VITAMIN C PO      Take 1,000 mg by mouth daily        VITAMIN D3 PO      Take 1,000 Units by mouth daily        * Notice:  This list has 6 medication(s) that are the same as other medications prescribed for you. Read the directions carefully, and ask your doctor or other care provider to review them with you.

## 2017-08-16 NOTE — TELEPHONE ENCOUNTER
Reason for Call:  Form, our goal is to have forms completed with 72 hours, however, some forms may require a visit or additional information.    Type of letter, form or note:  medical    Who is the form from?: Home care    Where did the form come from: form was faxed in    What clinic location was the form placed at?: [facility]    Where the form was placed: 's Box: Dickson Reich MD    What number is listed as a contact on the form?: [phone]       Additional comments: [employer] Form    Call taken on 8/16/2017 at 9:10 AM by Samantha Rodriguez

## 2017-08-17 NOTE — TELEPHONE ENCOUNTER
2nd attempt to reach Pt to inform him of his potassium being high & that he needs to  his prescription at the [facility] & take the Kayexalate to lower his potassium--left detailed message of this on his cell phone voicemail.    Pharmacist Shavon in [facility] will follow-up & attempt to reach Pt & will inform him of need to not take Vitamin C with Kayexalate due to interaction.

## 2017-08-17 NOTE — TELEPHONE ENCOUNTER
8-16-17 K+: 5.4    Notes Recorded by Gretel Quinn MD on 8/17/2017 at 9:24 AM  His potassium level is high. Please have him take kayexalate 15 grams oral one dose. Thanks    [facility] for Pt on his mobile to call back right away.

## 2017-08-18 NOTE — TELEPHONE ENCOUNTER
Patient called clinic this AM stating that he got a message that he needs to start Kayexalate. Explained to patient why he needs to take Kayexalate. Patient aware that this medication can cause diarrhea. Message routed to Dr. Quinn and Dr. Quinn's RN to follow up when a repeat potassium should be drawn. Greta Aviles RN

## 2017-08-21 NOTE — TELEPHONE ENCOUNTER
Test strips      Last Written Prescription Date: 3-17-17  Last Fill Quantity: 300,  # refills: 3   Last Office Visit with [facility], [facility] or [facility] prescribing provider: 8-15-17                                         Next 5 appointments (look out 90 days)     Sep 06, 2017  1:30 PM CDT   Return Visit with Gretel Quinn MD   [facility] ([facility])    [facility]  [address] [facility] [address]  [facility] [address]   [phone]            Sep 27, 2017 10:30 AM CDT   Return Visit with Gretel Quinn MD   [facility] ([facility])    [facility]  [address]  [facility] [address]   [phone]                    Prescription approved per [facility] Refill Protocol.

## 2017-08-22 NOTE — MR AVS SNAPSHOT
After Visit Summary   8/22/2017    Roc Parkinson    MRN: [id number]           Patient Information     Date Of Birth          7/7/1926        Visit Information        Provider Department      8/22/2017 3:30 PM Nurse,  Oncology [facility]        Today's Diagnoses     Multiple myeloma not having achieved remission (H)        Macrocytic anemia        Hyperkalemia           Follow-ups after your visit        Your next 10 appointments already scheduled     Aug 22, 2017  3:30 PM CDT   Return Visit with  Oncology Nurse   [facility] ([facility])    [facility] [place] [id number]  [place] [address]   [phone]            Aug 29, 2017 10:30 AM CDT   Diabetic Education with  DIABETIC ED RESOURCE   [facility] ([facility]    [address] [place] [address]   [phone]            Sep 06, 2017  1:00 PM CDT   Level 1 with  INFUSION CHAIR 7   [facility] ([facility])    [facility] [place] [id number]  [place] [address]   [phone]            Sep 06, 2017  1:30 PM CDT   Return Visit with Gretel Quinn MD   [facility] ([facility])    [facility]  [address]  [place] [address]   [phone]            Sep 27, 2017 10:00 AM CDT   Level 1 with  INFUSION CHAIR 15   [facility] ([facility])    [facility]  [address]  [place] [address]   [phone]            Sep 27, 2017 10:30 AM CDT   Return Visit with Gretel Quinn MD   [facility] ([facility])    [facility]  [address]  [place] [address]   [phone]            Oct 04, 2017 11:00 AM CDT   Level 1 with  INFUSION CHAIR 3    [facility] ([facility])    [facility] [place]  [address] [place] [address] [phone]              Who to contact     If you have questions or need follow up information about today's clinic visit or your schedule please contact [facility] directly at [phone].  Normal or non-critical lab and imaging results will be communicated to you by MyChart, letter or phone within 4 business days after the clinic has received the results. If you do not hear from us within 7 days, please contact the clinic through MyChart or phone. If you have a critical or abnormal lab result, we will notify you by phone as soon as possible.  Submit refill requests through [facility] or call your pharmacy and they will forward the refill request to us. Please allow 3 business days for your refill to be completed.          Additional Information About Your Visit        Care EveryWhere ID     This is your Care EveryWhere ID. This could be used by other organizations to access your [place] medical records  [id number]         Blood Pressure from Last 3 Encounters:   08/16/17 139/68   08/15/17 130/70   08/09/17 147/68    Weight from Last 3 Encounters:   08/16/17 90.7 kg (200 lb)   08/15/17 90.3 kg (199 lb)   08/07/17 89.4 kg (197 lb)              We Performed the Following     CBC with platelets differential     Comprehensive metabolic panel     Kappa and lambda light chain     Protein electrophoresis     Protein Immunofixation Serum        Primary Care Provider Office Phone # Fax #    Dickson Clark Reich [phone] [address]  [facility] [address]        Equal Access to Services     ELIE GAGNON : Hadii [place] hsieh Soaimee, waaxda luqadaha, qaybta kaalmada melita thomason. So [facility] [phone].    ATENCIÓN: Si habla español, tiene a nolan disposición servicios gratuitos de  asistencia lingüística. Eric al [phone].    We comply with applicable federal civil rights laws and [place] laws. We do not discriminate on the basis of race, color, national origin, age, disability sex, sexual orientation or gender identity.            Thank you!     Thank you for choosing [facility]  for your care. Our goal is always to provide you with excellent care. Hearing back from our patients is one way we can continue to improve our services. Please take a few minutes to complete the written survey that you may receive in the mail after your visit with us. Thank you!             Your Updated Medication List - Protect others around you: Learn how to safely use, store and throw away your medicines at www.disposemymeds.org.          This list is accurate as of: 8/22/17  3:30 PM.  Always use your most recent med list.                   Brand Name Dispense Instructions for use Diagnosis    acyclovir 400 MG tablet    ZOVIRAX    60 tablet    Take 1 tablet (400 mg) by mouth 2 times daily Viral Prophylaxis.    Multiple myeloma not having achieved remission (H)       amLODIPine 5 MG tablet    NORVASC    30 tablet    TAKE 1 TABLET BY MOUTH DAILY    Essential hypertension, benign       * B-D U/F 31G X 8 MM   Generic drug:  insulin pen needle           * insulin pen needle 31G X 8 MM    B-D U/F    100 each    Use 5 pen needles daily or as directed.    Type 2 diabetes mellitus with stage 3 chronic kidney disease, with long-term current use of insulin (H)       * cyclophosphamide 50 MG capsule CHEMO    CYTOXAN    24 capsule    Take 6 capsules (300 mg) by mouth once a week    Multiple myeloma not having achieved remission (H)       * cyclophosphamide 50 MG capsule CHEMO    CYTOXAN    24 capsule    Take 6 capsules (300 mg) by mouth once a week    Multiple myeloma not having achieved remission (H)       * dexamethasone 4 MG tablet    DECADRON    40 tablet    Take 10 tablets (40 mg) by mouth every 7 days  for 4 doses Take daily on Days 1, 8, 15, and 22.    Multiple myeloma not having achieved remission (H)       * dexamethasone 4 MG tablet    DECADRON    40 tablet    Take 10 tablets (40 mg) by mouth every 7 days for 4 doses Take daily on Days 1, 8, 15, and 22.    Multiple myeloma not having achieved remission (H)       ferrous sulfate 325 (65 FE) MG tablet    IRON     Take 325 mg by mouth daily (with breakfast)        FREESTYLE LITE test strip   Generic drug:  blood glucose monitoring     200 strip    USE 1 STRIP TO TEST TWICE DAILY.    Uncontrolled type 1 diabetes mellitus with stage 3 chronic kidney disease (H)       furosemide 20 MG tablet    LASIX    30 tablet    Take 1 tablet (20 mg) by mouth daily    Multiple myeloma not having achieved remission (H)       gemfibrozil 600 MG tablet    LOPID    180 tablet    TAKE 1 TABLET BY MOUTH TWICE DAILY    Hyperlipidemia       glipiZIDE 10 MG tablet    GLUCOTROL    180 tablet    TAKE 1 TABLET BY MOUTH TWICE DAILY BEFORE A MEAL    Type 2 diabetes mellitus with stage 3 chronic kidney disease, with long-term current use of insulin (H)       insulin aspart 100 UNIT/ML injection    NovoLOG PEN    12 mL    Inject 4 Units Subcutaneous 3 times daily (with meals) Before meal correction: 140-225 - add 1 unit  226-310 - add 2 units 311-395 - add 3 units 396-480 - add 4 units             >480 - add 5 units >481 - take 6 units Bedtime correction of: 200-299 give 1 unit 300-399 take 2 units 400 or greater take 3 units On Wed-Thurs after chemo, add an additional 2 units to sliding scale.    Uncontrolled type 2 diabetes mellitus with hyperglycemia, unspecified long term insulin use status (H)       insulin detemir 100 UNIT/ML injection    LEVEMIR FLEXPEN/FLEXTOUCH    15 mL    Inject 14 Units Subcutaneous every morning (before breakfast)    Type 2 diabetes mellitus with diabetic chronic kidney disease, unspecified CKD stage, unspecified long term insulin use status (H)       levothyroxine  25 MCG tablet    SYNTHROID/LEVOTHROID    90 tablet    TAKE 1 TABLET BY MOUTH EVERY DAY.    Acquired hypothyroidism       lisinopril 30 MG tablet    PRINIVIL,ZESTRIL    90 tablet    TAKE 1 TABLET BY MOUTH EVERY DAY    Essential hypertension, benign       metoprolol 25 MG tablet    LOPRESSOR    180 tablet    TAKE 1 TABLET BY MOUTH TWICE DAILY    Essential hypertension       omeprazole 20 MG CR capsule    priLOSEC    90 capsule    TAKE 1 CAPSULE BY MOUTH EVERY DAY    Congenital hiatus hernia       ondansetron 8 MG tablet    ZOFRAN    10 tablet    Take 1 tablet (8 mg) by mouth every 8 hours as needed (Nausea/Vomiting)    Multiple myeloma not having achieved remission (H)       polyethylene glycol powder    MIRALAX/GLYCOLAX          prochlorperazine 5 MG tablet    COMPAZINE    30 tablet    Take 1 tablet (5 mg) by mouth every 6 hours as needed (Nausea/Vomiting)    Multiple myeloma not having achieved remission (H)       Selenium 200 MCG Tabs      Take 100 mcg by mouth daily. Pt takes one half tab of the 200 mcg daily        tamsulosin 0.4 MG capsule    FLOMAX    90 capsule    TAKE 1 CAPSULE BY MOUTH DAILY    Malignant neoplasm of prostate (H)       VITAMIN C PO      Take 1,000 mg by mouth daily        VITAMIN D3 PO      Take 1,000 Units by mouth daily        * Notice:  This list has 6 medication(s) that are the same as other medications prescribed for you. Read the directions carefully, and ask your doctor or other care provider to review them with you.

## 2017-08-22 NOTE — PROGRESS NOTES
Medical Assistant Note:  Roc Parkinson presents today for labs.    Patient seen by provider today: No.   present during visit today: Not Applicable.    Concerns: No Concerns.    Procedure:  Lab draw site: Right Hand, Needle type: BF, Gauge: 23.    Post Assessment:  Labs drawn without difficulty: Yes.    Discharge Plan:  Pt tolerated procedure well. Gauze and coban applied.    Face to Face Time: 5min.    Yolanda Marsh MA

## 2017-08-29 NOTE — PROGRESS NOTES
Diabetes Follow-up    Subjective/Objective:    Roc Parkinson appears for appointment with blood glucose log for review. Last date of diabetes visit  was: 08/04/17.  He does not have his meter today, but written records.     Diabetes is being managed with oral and Injectable Medications:     Glipizide 10 mg before breakfast and dinner.  Prescription is Levemir 14-0-0-0 and NovoLog Insulin 4-4-4-0.    However, he is not taking insulin as directed. Roc says he is not taking 4 units at the meal and using the sliding scale, rather, he is just taking 7, 8, 9, or 10 units. No particular strategy, but he looks back at what worked a few days ago or what didn't work and increases the number of units if needed.     He notices higher numbers on chemo days (Wed, Thur)    Prescribed correction dose:       BG Log: (starts the second week of August)        He describes that he is up a couple of times every night and a few times, has felt symptomatic of low BG. Checks (that are not recorded here) are in the 60's by report, denies lower than 60. Treats with chocolate candy. Couple times has eaten cereal when felt hungry.     Assessment using pre-meal target 130 or lower:    Fasting blood glucose: 71% in target.  Before lunch glucose: <1% in target.  Before dinner glucose: <1 % in target.  Bedtime glucose: <1% in target.      He eats 3 meals daily, no HS snack.   Recall:  Br= an egg with toast and 8 oz papaya juice (36grams carb) (he thinks this helps with multiple myeloma) or cereal or cream of wheat or oatmeal (eats 1 bowl of any of the cereal) with milk on it (to cover cereal) and honey (1 spoonful)  * breakfast ~ 50 grams carb at egg meal to ~110g carb at cereal meal*  Tawanna = tomato sandwich (2 bread) or a hot dog with a bowl of vegetable soup or chicken noodle soup and 1 cup milk * ~60 grams carb*  Din= meat (red or chicken) with fingerling or sweet potatoes and non-starchy veg (cuke, tomato, zucchini) with 8 oz papaya  "juice * ~60 grams carb*  NO snack- unless raw veggie on occasion    Roc says that he understands the importance of consistent meal intake, but would like more variety. He is not familiar with carb counting.   He would also like to eat an occasional dessert.    Calculations indicate:  1) \"rule of 1800\" indicates 1 unit drops Roc 36 mg/dl  2) \"rule of 450\" indicates 1 unit covers 9 grams carb.    With this in mind:  ~ he needs 5.5 units to cover the toast breakfast, 12 units to cover the cereal breakfast  ~ he needs 6.6 units at lunch and at dinner    Sliding scale at meals would look like:   If blood sugar is  Take Novolog  140-175  1 unit extra    176-210   2 units extra    211-245  3 units extra    246-280  4 units extra    281-315  5 units extra    316-350  6 units extra    351-385  7 units extra    386-420  8 units extra    Over 420   9 units extra      Plan/Response:  See Patient Instructions for co-developed, patient-stated behavior change goals.  Keep a blood glucose record for next visit.  Recommend increase Novolog insulin at meal times to 5-6-6-0.   With some overnight lows reported, recommend continue bedtime sliding scale as above, reduce Glipizide dose by 50% at dinner.    Recommend change in sliding scale to above. This coordinates fairly closely with what he has been guessing, but is more structured and offers slightly more insulin for better control. He is able to demonstrate the math.     Kalyn Almanzar RD, LD, CDE      Any diabetes medication dose changes were made via the CDE Protocol and Collaborative Practice Agreement with the patient's referring provider. A copy of this encounter was shared with the provider.      "

## 2017-08-29 NOTE — PATIENT INSTRUCTIONS
We will ask Dr. Reich to approve this plan.   I will call you either way.   Do not make changes until we ask doctor.    My recommendation to start:  1) keep taking 14 units of Levemir every morning     2) Every day at breakfast if eating egg, toast, juice take 5 units Novolog before eating.     Every day at breakfast if eating cereal with juice, milk, honey take 10 units Novolog before eating.     3) At lunch and supper, take 6 units Novolog before eating.    4) Sliding Scale:  If blood sugar is  Take Novolog  140-175  1 unit extra    176-210   2 units extra    211-245  3 units extra    246-280  4 units extra    281-315  5 units extra    316-350  6 units extra    351-385  7 units extra    386-420  8 units extra    Over 420   9 units extra    Please call if your blood sugar is under 65 3 times.     See you on September 7 at 10:30!    Bring blood glucose meter and logbook with you to all doctor and follow-up appointments.     [facility] for the [facility]:  For Your Diabetes Education and Nutrition Appointments Call:  [phone]   For Diabetes Education or Nutrition Related Questions:   Phone: [phone]  E-mail: [email or web address]  Fax: [phone]   If you need a medication refill please contact your pharmacy. Please allow 3 business days for your refills to be completed.

## 2017-08-29 NOTE — Clinical Note
"Dr. Reich,  Daytime BG readings are still elevated, calculated doses suggest need for more aggressive sliding scale at meals (shown in patient directions and note). With some overnight readings in 60's, recommend decrease evening Glipizide dose 50%.  Could you please \"ok\" if you are agreeable to these changes or suggest alternative? Thank you! Kalyn Almanzar RD, LD, CDE "

## 2017-08-29 NOTE — MR AVS SNAPSHOT
After Visit Summary   8/29/2017    Roc Parkinson    MRN: [id number]           Patient Information     Date Of Birth          7/7/1926        Visit Information        Provider Department      8/29/2017 10:30 AM  DIABETIC ED RESOURCE [facility] Instructions    We will ask Dr. Reich to approve this plan.   I will call you either way.   Do not make changes until we ask doctor.    My recommendation to start:  1) keep taking 14 units of Levemir every morning     2) Every day at breakfast if eating egg, toast, juice take 5 units Novolog before eating.     Every day at breakfast if eating cereal with juice, milk, honey take 10 units Novolog before eating.     3) At lunch and supper, take 6 units Novolog before eating.    4) Sliding Scale:  If blood sugar is  Take Novolog  140-175  1 unit extra    176-210   2 units extra    211-245  3 units extra    246-280  4 units extra    281-315  5 units extra    316-350  6 units extra    351-385  7 units extra    386-420  8 units extra    Over 420   9 units extra    Please call if your blood sugar is under 65 3 times.     See you on September 7 at 10:30!    Bring blood glucose meter and logbook with you to all doctor and follow-up appointments.     [facility] for the [facility]:  For Your Diabetes Education and Nutrition Appointments Call:  [phone]   For Diabetes Education or Nutrition Related Questions:   Phone: [phone]  E-mail: [email or web address]  Fax: [phone]   If you need a medication refill please contact your pharmacy. Please allow 3 business days for your refills to be completed.              Follow-ups after your visit        Your next 10 appointments already scheduled     Sep 06, 2017  1:00 PM CDT   Level 1 with  INFUSION CHAIR 7   [facility] ([facility])    [facility] [place]  [address]  [place] [address]   [phone]            Sep 06, 2017  1:30 PM CDT   Return Visit with Gretel Quinn MD   [facility] ([facility])    [facility] [place]  [address]  [place] [address]   [phone]            Sep 07, 2017 10:30 AM CDT   Diabetic Education with  DIABETIC ED RESOURCE   [facility] ([facility]    [address] [place] [address]   [phone]            Sep 27, 2017 10:00 AM CDT   Level 1 with  INFUSION CHAIR 15   [facility] ([facility])    [facility] [place]  [address]  [place] [address]   [phone]            Sep 27, 2017 10:30 AM CDT   Return Visit with Gretel Quinn MD   [facility] ([facility])    [facility] [place]  [address]  [place] [address]   [phone]            Oct 04, 2017 11:00 AM CDT   Level 1 with  INFUSION CHAIR 3   [facility])    [facility] [place]  [address]  [place] [address]   [phone]              Who to contact     If you have questions or need follow up information about today's clinic visit or your schedule please contact [facility] directly at [phone].  Normal or non-critical lab and imaging results will be communicated to you by MyChart, letter or phone within 4 business days after the clinic has received the results. If you do not hear from us within 7 days, please contact the clinic through MyChart or phone. If you have a critical or abnormal lab result, we will notify you by phone as soon as possible.  Submit refill requests through Cashually or call your pharmacy and they will forward the refill request to us. Please allow 3 business days for your refill to  be completed.          Additional Information About Your Visit        Care EveryWhere ID     This is your Care EveryWhere ID. This could be used by other organizations to access your [facility] medical records  [id number]         Blood Pressure from Last 3 Encounters:   08/16/17 139/68   08/15/17 130/70   08/09/17 147/68    Weight from Last 3 Encounters:   08/16/17 90.7 kg (200 lb)   08/15/17 90.3 kg (199 lb)   08/07/17 89.4 kg (197 lb)              Today, you had the following     No orders found for display       Primary Care Provider Office Phone # Fax #    Dickson Reich [phone]       [address] [facility] [address]        Equal Access to Services     ELIE GAGNON : Hadii virginia wilson hadasho Soomaali, waaxda luqadaha, qaybta kaalmada adeegyada, melita idiin haymarge shaw . So [facility] [phone].    ATENCIÓN: Si habla español, tiene a nolan disposición servicios gratuitos de asistencia lingüística. Llame al [phone].    We comply with applicable federal civil rights laws and [place] laws. We do not discriminate on the basis of race, color, national origin, age, disability sex, sexual orientation or gender identity.            Thank you!     Thank you for choosing [facility]  for your care. Our goal is always to provide you with excellent care. Hearing back from our patients is one way we can continue to improve our services. Please take a few minutes to complete the written survey that you may receive in the mail after your visit with us. Thank you!             Your Updated Medication List - Protect others around you: Learn how to safely use, store and throw away your medicines at www.disposemymeds.org.          This list is accurate as of: 8/29/17 11:50 AM.  Always use your most recent med list.                   Brand Name Dispense Instructions for use Diagnosis    acyclovir 400 MG tablet    ZOVIRAX    60 tablet    Take 1 tablet (400 mg)  by mouth 2 times daily Viral Prophylaxis.    Multiple myeloma not having achieved remission (H)       amLODIPine 5 MG tablet    NORVASC    30 tablet    TAKE 1 TABLET BY MOUTH DAILY    Essential hypertension, benign       * B-D U/F 31G X 8 MM   Generic drug:  insulin pen needle           * insulin pen needle 31G X 8 MM    B-D U/F    100 each    Use 5 pen needles daily or as directed.    Type 2 diabetes mellitus with stage 3 chronic kidney disease, with long-term current use of insulin (H)       * cyclophosphamide 50 MG capsule CHEMO    CYTOXAN    24 capsule    Take 6 capsules (300 mg) by mouth once a week    Multiple myeloma not having achieved remission (H)       * cyclophosphamide 50 MG capsule CHEMO    CYTOXAN    24 capsule    Take 6 capsules (300 mg) by mouth once a week    Multiple myeloma not having achieved remission (H)       dexamethasone 4 MG tablet    DECADRON    40 tablet    Take 10 tablets (40 mg) by mouth every 7 days for 4 doses Take daily on Days 1, 8, 15, and 22.    Multiple myeloma not having achieved remission (H)       ferrous sulfate 325 (65 FE) MG tablet    IRON     Take 325 mg by mouth daily (with breakfast)        FREESTYLE LITE test strip   Generic drug:  blood glucose monitoring     200 strip    USE 1 STRIP TO TEST TWICE DAILY.    Uncontrolled type 1 diabetes mellitus with stage 3 chronic kidney disease (H)       furosemide 20 MG tablet    LASIX    30 tablet    Take 1 tablet (20 mg) by mouth daily    Multiple myeloma not having achieved remission (H)       gemfibrozil 600 MG tablet    LOPID    180 tablet    TAKE 1 TABLET BY MOUTH TWICE DAILY    Hyperlipidemia       glipiZIDE 10 MG tablet    GLUCOTROL    180 tablet    TAKE 1 TABLET BY MOUTH TWICE DAILY BEFORE A MEAL    Type 2 diabetes mellitus with stage 3 chronic kidney disease, with long-term current use of insulin (H)       insulin aspart 100 UNIT/ML injection    NovoLOG PEN    12 mL    Inject 4 Units Subcutaneous 3 times daily (with  meals) Before meal correction: 140-225 - add 1 unit  226-310 - add 2 units 311-395 - add 3 units 396-480 - add 4 units             >480 - add 5 units >481 - take 6 units Bedtime correction of: 200-299 give 1 unit 300-399 take 2 units 400 or greater take 3 units On Wed-Thurs after chemo, add an additional 2 units to sliding scale.    Uncontrolled type 2 diabetes mellitus with hyperglycemia, unspecified long term insulin use status (H)       insulin detemir 100 UNIT/ML injection    LEVEMIR FLEXPEN/FLEXTOUCH    15 mL    Inject 14 Units Subcutaneous every morning (before breakfast)    Type 2 diabetes mellitus with diabetic chronic kidney disease, unspecified CKD stage, unspecified long term insulin use status (H)       levothyroxine 25 MCG tablet    SYNTHROID/LEVOTHROID    90 tablet    TAKE 1 TABLET BY MOUTH EVERY DAY.    Acquired hypothyroidism       lisinopril 30 MG tablet    PRINIVIL,ZESTRIL    90 tablet    TAKE 1 TABLET BY MOUTH EVERY DAY    Essential hypertension, benign       metoprolol 25 MG tablet    LOPRESSOR    180 tablet    TAKE 1 TABLET BY MOUTH TWICE DAILY    Essential hypertension       omeprazole 20 MG CR capsule    priLOSEC    90 capsule    TAKE 1 CAPSULE BY MOUTH EVERY DAY    Congenital hiatus hernia       ondansetron 8 MG tablet    ZOFRAN    10 tablet    Take 1 tablet (8 mg) by mouth every 8 hours as needed (Nausea/Vomiting)    Multiple myeloma not having achieved remission (H)       polyethylene glycol powder    MIRALAX/GLYCOLAX          prochlorperazine 5 MG tablet    COMPAZINE    30 tablet    Take 1 tablet (5 mg) by mouth every 6 hours as needed (Nausea/Vomiting)    Multiple myeloma not having achieved remission (H)       Selenium 200 MCG Tabs      Take 100 mcg by mouth daily. Pt takes one half tab of the 200 mcg daily        tamsulosin 0.4 MG capsule    FLOMAX    90 capsule    TAKE 1 CAPSULE BY MOUTH DAILY    Malignant neoplasm of prostate (H)       VITAMIN C PO      Take 1,000 mg by mouth daily         VITAMIN D3 PO      Take 1,000 Units by mouth daily        * Notice:  This list has 4 medication(s) that are the same as other medications prescribed for you. Read the directions carefully, and ask your doctor or other care provider to review them with you.

## 2017-08-30 NOTE — TELEPHONE ENCOUNTER
Roc was seen 8/29 for diabetes ed and changes were recommended:  1) new mealtime insulin regimen with sliding scale (see note of 8/29)  2) reduce Glipizide at dinner to 5 mg (cut pill in half)    I called Roc and spoke to him to confirm that doctor approved changes. He can begin using sliding scale with each meal and cut dinner time Glipizide in half (it is not extended release). Take only stefania with dinner each night.  Continue to use bedtime sliding scale from before.  He denies questions at this time.    He reports a BG of 50 this morning.

## 2017-09-06 NOTE — PROGRESS NOTES
[facility] PHYSICIANS  HEMATOLOGY ONCOLOGY     DIAGNOSIS:    1- Kappa light chain IgM multiple myeloma in a 89-year-old patient.  Bone marrow biopsy on 11/17/2016 showed hypercellular bone marrow with 65% cellularity of light chain restricted plasma cells.  FISH or G-banding could not be performed due to insufficient sample.   There is a background of myelodysplastic syndrome.  The patient was initially seen in the hospital on 11/17/2016 for macrocytic anemia and pancytopenia and transfusion requirement and a bone marrow biopsy was performed.   2- History of prostate cancer s/p EBRT s/p brachytherapy in 2003 (recent PSA 0.10), superficial bladder cancer ,no recurrences since 1986     TREATMENT: 12/15/16 velcade/dex. 4/12/17 added cyclophosphamide 300 mg weekly.6/28/17 we discontinued velcade due to concern for neuropathy and continued with weekly cyclophosphamide and dexamethasone.      SUBJECTIVE:  The patient was seen as a followup today. He has been feeling well. Tolerating treatment well. The hand pain is better as well.     REVIEW OF SYSTEMS:  A complete review of systems was performed and found to be negative other than pertinent positives mentioned in history of present illness.     Past medical, social histories reviewed.    Meds- Reviewed.     PHYSICAL EXAMINATION:   VITAL SIGNS:/73  Pulse 77  Resp 16  Wt 93.3 kg (205 lb 9.6 oz)  BMI 29.5 kg/m2  CONSTITUTIONAL: Appears tired.   HEENT: Pupils are equal. Oropharynx is clear.   NECK: No cervical or supraclavicular lymphadenopathy.   RESPIRATORY: Clear bilaterally.   CARD/VASC: S1, S2, regular.   GI: Soft, nontender, nondistended, no hepatosplenomegaly.   MUSKULOSKELETAL: Warm, well perfused.   NEUROLOGIC: Alert, awake.   INTEGUMENT: No rash.   LYMPHATICS: Trace edema.   PSYCH: Mood and affect was normal.     LABORATORY DATA AND IMAGING REVIEWED DURING THIS VISIT:  Recent Labs   Lab Test  09/06/17   1300  08/22/17   1601  08/22/17   1520    NA  139   --   139   POTASSIUM  5.0  4.6  4.6   CHLORIDE  108   --   107   CO2  22   --   21   ANIONGAP  9   --   11   BUN  36*   --   33*   CR  1.14   --   1.04   GLC  237*   --   146*   MICHELLE  9.1   --   9.2     Recent Labs   Lab Test  09/06/17   1300  08/22/17   1520  08/16/17   1310   WBC  4.3  2.8*  3.7*   HGB  9.1*  9.3*  10.1*   PLT  132*  108*  125*   MCV  111*  112*  112*   NEUTROPHIL  88.6  73.1  90.3     Recent Labs   Lab Test  09/06/17   1300  08/22/17   1520  08/16/17   1310   11/17/16   0938   BILITOTAL  0.4  0.4  0.4   < >   --    ALKPHOS  66  62  68   < >   --    ALT  16  17  18   < >   --    AST  12  10  13   < >   --    ALBUMIN  3.2*  3.2*  3.1*   < >   --    LDH   --    --    --    --   110    < > = values in this interval not displayed.       ECOG PS: 1    ASSESSMENT:  This is a 89-year-old gentleman who has kappa light chain multiple myeloma with hypercellular marrow with 65% of cells are light chain restricted plasma cells.  He had severe pancytopenia and has needed a transfusion in the hospital.  He did not have hypercalcemia and his renal function was normal. He has a background of myelodysplastic syndrome on his bone marrow biopsy; however, majority of the cell population was monoclonal plasma cell population.   He was started on treatment due to cytopenia.   In 4/2017 his light chain levels were getting worse. M spike was stable. We added cyclophosphamide to the regimen. In 6/2017 discontinued velcade for concern of development of neuropathy, in hindsight the pain appears to be related to arthritis.     - Labs were reviewed with the patient, normocytic anemia related to disease. Thrombocytopenia at 132K.   - We will repeat myeloma labs today.   - He is on Aranesp 300 mcg SQ every three weeks.    PLAN:   1.  Continue cyclophosphamide, dexamethasone   2.  Zometa every six weeks  3. Continue Aranesp 300 mcg SQ every three weeks  4- RTC MD weeks   5- Labs today SPEP, IFXN, light chains    MAD  MD DONTRELL    9/6/2017

## 2017-09-06 NOTE — MR AVS SNAPSHOT
After Visit Summary   9/6/2017    Roc Parkinson    MRN: [id number]           Patient Information     Date Of Birth          7/7/1926        Visit Information        Provider Department      9/6/2017 1:30 PM Gretel Quinn MD [facility]        Today's Diagnoses     Multiple myeloma not having achieved remission (H)    -  1      Care Instructions    1.  Continue cyclophosphamide, dexamethasone   2.  Zometa every six weeks  3. Continue Aranesp 300 mcg SQ every three weeks  4- RTC MD weeks   5- Labs today SPEP, IFXN, light chains          Follow-ups after your visit        Your next 10 appointments already scheduled     Sep 07, 2017 10:30 AM CDT   Diabetic Education with  DIABETIC ED RESOURCE   [facility]    [address] [place] [id number] [phone]            Sep 27, 2017 10:00 AM CDT   Level 1 with SH INFUSION CHAIR 15   [facility]    [facility]  [address]  [place] [address]   [phone]            Oct 18, 2017 10:30 AM CDT   Level 2 with SH INFUSION CHAIR 18   [facility]    [facility]  [address]  [place] [address]   [phone]            Oct 18, 2017 11:00 AM CDT   Return Visit with Gretel Quinn MD   [facility]    [facility]  [address]  [place] [address]   [phone]              Who to contact     If you have questions or need follow up information about today's clinic visit or your schedule please contact [facility] directly at [phone].  Normal or non-critical lab and imaging results will be communicated to you by MyChart, letter or phone within 4 business days after the clinic has received the  results. If you do not hear from us within 7 days, please contact the clinic through Geddithart or phone. If you have a critical or abnormal lab result, we will notify you by phone as soon as possible.  Submit refill requests through Cognitics or call your pharmacy and they will forward the refill request to us. Please allow 3 business days for your refill to be completed.          Additional Information About Your Visit        Care EveryWhere ID     This is your Care EveryWhere ID. This could be used by other organizations to access your [facility] medical records  [id number]        Your Vitals Were     Pulse Respirations BMI (Body Mass Index)             77 16 29.5 kg/m2          Blood Pressure from Last 3 Encounters:   09/06/17 152/73   09/06/17 152/73   08/16/17 139/68    Weight from Last 3 Encounters:   09/06/17 93.3 kg (205 lb 9.6 oz)   09/06/17 93.3 kg (205 lb 9.6 oz)   08/16/17 90.7 kg (200 lb)              We Performed the Following     Kappa and lambda light chain     Protein electrophoresis     Protein Immunofixation Serum          Where to get your medicines      These medications were sent to [facility] [place] - [address]  [address], [place] [address]     Phone:  [phone]     furosemide 20 MG tablet          Primary Care Provider Office Phone # Fax #    Dickson Clark Reich [phone] [address]  [facility] [address]        Equal Access to Services     ELIE GAGNON : Hadii aad ku hadasho Soomaali, waaxda luqadaha, qaybta kaalmada adeegyahanh, melita shaw . So [facility] [phone].    ATENCIÓN: Si habla español, tiene a nolan disposición servicios gratuitos de asistencia lingüística. Llame al [phone].    We comply with applicable federal civil rights laws and [place] laws. We do not discriminate on the basis of race, color, national origin, age, disability sex, sexual orientation or gender  identity.            Thank you!     Thank you for choosing [facility]  for your care. Our goal is always to provide you with excellent care. Hearing back from our patients is one way we can continue to improve our services. Please take a few minutes to complete the written survey that you may receive in the mail after your visit with us. Thank you!             Your Updated Medication List - Protect others around you: Learn how to safely use, store and throw away your medicines at www.disposemymeds.org.          This list is accurate as of: 9/6/17  2:09 PM.  Always use your most recent med list.                   Brand Name Dispense Instructions for use Diagnosis    acyclovir 400 MG tablet    ZOVIRAX    60 tablet    Take 1 tablet (400 mg) by mouth 2 times daily Viral Prophylaxis.    Multiple myeloma not having achieved remission (H)       amLODIPine 5 MG tablet    NORVASC    30 tablet    TAKE 1 TABLET BY MOUTH DAILY    Essential hypertension, benign       * B-D U/F 31G X 8 MM   Generic drug:  insulin pen needle           * insulin pen needle 31G X 8 MM    B-D U/F    100 each    Use 5 pen needles daily or as directed.    Type 2 diabetes mellitus with stage 3 chronic kidney disease, with long-term current use of insulin (H)       * cyclophosphamide 50 MG capsule CHEMO    CYTOXAN    24 capsule    Take 6 capsules (300 mg) by mouth once a week    Multiple myeloma not having achieved remission (H)       * cyclophosphamide 50 MG capsule CHEMO    CYTOXAN    24 capsule    Take 6 capsules (300 mg) by mouth once a week    Multiple myeloma not having achieved remission (H)       dexamethasone 4 MG tablet    DECADRON    40 tablet    Take 10 tablets (40 mg) by mouth every 7 days for 4 doses Take daily on Days 1, 8, 15, and 22.    Multiple myeloma not having achieved remission (H)       ferrous sulfate 325 (65 FE) MG tablet    IRON     Take 325 mg by mouth daily (with breakfast)        FREESTYLE LITE test strip    Generic drug:  blood glucose monitoring     200 strip    USE 1 STRIP TO TEST TWICE DAILY.    Uncontrolled type 1 diabetes mellitus with stage 3 chronic kidney disease (H)       furosemide 20 MG tablet    LASIX    30 tablet    Take 1 tablet (20 mg) by mouth daily    Multiple myeloma not having achieved remission (H)       gemfibrozil 600 MG tablet    LOPID    180 tablet    TAKE 1 TABLET BY MOUTH TWICE DAILY    Hyperlipidemia       glipiZIDE 10 MG tablet    GLUCOTROL    180 tablet    TAKE 1 TABLET BY MOUTH TWICE DAILY BEFORE A MEAL    Type 2 diabetes mellitus with stage 3 chronic kidney disease, with long-term current use of insulin (H)       insulin aspart 100 UNIT/ML injection    NovoLOG PEN    12 mL    Inject 4 Units Subcutaneous 3 times daily (with meals) Before meal correction: 140-225 - add 1 unit  226-310 - add 2 units 311-395 - add 3 units 396-480 - add 4 units             >480 - add 5 units >481 - take 6 units Bedtime correction of: 200-299 give 1 unit 300-399 take 2 units 400 or greater take 3 units On Wed-Thurs after chemo, add an additional 2 units to sliding scale.    Uncontrolled type 2 diabetes mellitus with hyperglycemia, unspecified long term insulin use status (H)       insulin detemir 100 UNIT/ML injection    LEVEMIR FLEXPEN/FLEXTOUCH    15 mL    Inject 14 Units Subcutaneous every morning (before breakfast)    Type 2 diabetes mellitus with diabetic chronic kidney disease, unspecified CKD stage, unspecified long term insulin use status (H)       levothyroxine 25 MCG tablet    SYNTHROID/LEVOTHROID    90 tablet    TAKE 1 TABLET BY MOUTH EVERY DAY.    Acquired hypothyroidism       lisinopril 30 MG tablet    PRINIVIL,ZESTRIL    90 tablet    TAKE 1 TABLET BY MOUTH EVERY DAY    Essential hypertension, benign       metoprolol 25 MG tablet    LOPRESSOR    180 tablet    TAKE 1 TABLET BY MOUTH TWICE DAILY    Essential hypertension       omeprazole 20 MG CR capsule    priLOSEC    90 capsule    TAKE 1 CAPSULE  BY MOUTH EVERY DAY    Congenital hiatus hernia       ondansetron 8 MG tablet    ZOFRAN    10 tablet    Take 1 tablet (8 mg) by mouth every 8 hours as needed (Nausea/Vomiting)    Multiple myeloma not having achieved remission (H)       polyethylene glycol powder    MIRALAX/GLYCOLAX          prochlorperazine 5 MG tablet    COMPAZINE    30 tablet    Take 1 tablet (5 mg) by mouth every 6 hours as needed (Nausea/Vomiting)    Multiple myeloma not having achieved remission (H)       Selenium 200 MCG Tabs      Take 100 mcg by mouth daily. Pt takes one half tab of the 200 mcg daily        tamsulosin 0.4 MG capsule    FLOMAX    90 capsule    TAKE 1 CAPSULE BY MOUTH DAILY    Malignant neoplasm of prostate (H)       VITAMIN C PO      Take 1,000 mg by mouth daily        VITAMIN D3 PO      Take 1,000 Units by mouth daily        * Notice:  This list has 4 medication(s) that are the same as other medications prescribed for you. Read the directions carefully, and ask your doctor or other care provider to review them with you.

## 2017-09-06 NOTE — PATIENT INSTRUCTIONS
1.  Continue cyclophosphamide, dexamethasone   2.  Zometa every six weeks  3. Continue Aranesp 300 mcg SQ every three weeks  4- RTC MD weeks   5- Labs today SPEP, IFXN, light chains

## 2017-09-06 NOTE — MR AVS SNAPSHOT
After Visit Summary   9/6/2017    Roc Parkinson    MRN: [id number]           Patient Information     Date Of Birth          7/7/1926        Visit Information        Provider Department      9/6/2017 1:00 PM  INFUSION CHAIR 7 [facility]        Today's Diagnoses     Hypercalcemia    -  1    Multiple myeloma not having achieved remission (H)        Macrocytic anemia        MDS (myelodysplastic syndrome) (H)           Follow-ups after your visit        Your next 10 appointments already scheduled     Sep 07, 2017 10:30 AM CDT   Diabetic Education with  DIABETIC ED RESOURCE   [facility] ([facility])    [address] [address]   [phone]            Sep 27, 2017 10:00 AM CDT   Level 1 with  INFUSION CHAIR 15   [facility] ([facility])    [facility]  [address]  [place] [address]   [phone]            Oct 18, 2017 10:30 AM CDT   Level 2 with  INFUSION CHAIR 18   [facility] ([facility])    [facility]  [address]  [place] [address]   [phone]            Oct 18, 2017 11:00 AM CDT   Return Visit with Gretel Quinn MD   [facility] ([facility])    [facility]  [address]  [place] [address]   [phone]              Who to contact     If you have questions or need follow up information about today's clinic visit or your schedule please contact [facility] directly at [phone].  Normal or non-critical lab and imaging results will be communicated to you by MyChart, letter or phone within 4 business days after the clinic has received the results. If you do not hear from us within 7 days, please contact the clinic through  [facility] or phone. If you have a critical or abnormal lab result, we will notify you by phone as soon as possible.  Submit refill requests through [employer] or call your pharmacy and they will forward the refill request to us. Please allow 3 business days for your refill to be completed.          Additional Information About Your Visit        Care EveryWhere ID     This is your Care EveryWhere ID. This could be used by other organizations to access your [facility] medical records  [id number]        Your Vitals Were     Pulse Respirations BMI (Body Mass Index)             77 16 29.5 kg/m2          Blood Pressure from Last 3 Encounters:   09/06/17 152/73   09/06/17 152/73   08/16/17 139/68    Weight from Last 3 Encounters:   09/06/17 93.3 kg (205 lb 9.6 oz)   09/06/17 93.3 kg (205 lb 9.6 oz)   08/16/17 90.7 kg (200 lb)              We Performed the Following     CBC with platelets differential     Comprehensive metabolic panel          Where to get your medicines      These medications were sent to [facility] [place] - [address]  [address] [facility] [address], [facility] [address]     Phone:  [phone]     furosemide 20 MG tablet          Primary Care Provider Office Phone # Fax #    Dickson Reich [phone] [address] [facility] [address]        Equal Access to Services     SANDRA [facility] Hospital[facility] : Hadii aad ku hadasho Soaimee, waaxda luqadaha, qaybta kaalmada paddy, melita shaw . So [facility] [phone].    ATENCIÓN: Si habla español, tiene a nolan disposición servicios gratuitos de asistencia lingüística. Eric al [phone].    We comply with applicable federal civil rights laws and [place] laws. We do not discriminate on the basis of race, color, national origin, age, disability sex, sexual orientation or gender identity.            Thank you!     Thank you for choosing [facility]  for your care.  Our goal is always to provide you with excellent care. Hearing back from our patients is one way we can continue to improve our services. Please take a few minutes to complete the written survey that you may receive in the mail after your visit with us. Thank you!             Your Updated Medication List - Protect others around you: Learn how to safely use, store and throw away your medicines at www.disposemymeds.org.          This list is accurate as of: 9/6/17  2:34 PM.  Always use your most recent med list.                   Brand Name Dispense Instructions for use Diagnosis    acyclovir 400 MG tablet    ZOVIRAX    60 tablet    Take 1 tablet (400 mg) by mouth 2 times daily Viral Prophylaxis.    Multiple myeloma not having achieved remission (H)       amLODIPine 5 MG tablet    NORVASC    30 tablet    TAKE 1 TABLET BY MOUTH DAILY    Essential hypertension, benign       * B-D U/F 31G X 8 MM   Generic drug:  insulin pen needle           * insulin pen needle 31G X 8 MM    B-D U/F    100 each    Use 5 pen needles daily or as directed.    Type 2 diabetes mellitus with stage 3 chronic kidney disease, with long-term current use of insulin (H)       * cyclophosphamide 50 MG capsule CHEMO    CYTOXAN    24 capsule    Take 6 capsules (300 mg) by mouth once a week    Multiple myeloma not having achieved remission (H)       * cyclophosphamide 50 MG capsule CHEMO    CYTOXAN    24 capsule    Take 6 capsules (300 mg) by mouth once a week    Multiple myeloma not having achieved remission (H)       dexamethasone 4 MG tablet    DECADRON    40 tablet    Take 10 tablets (40 mg) by mouth every 7 days for 4 doses Take daily on Days 1, 8, 15, and 22.    Multiple myeloma not having achieved remission (H)       ferrous sulfate 325 (65 FE) MG tablet    IRON     Take 325 mg by mouth daily (with breakfast)        FREESTYLE LITE test strip   Generic drug:  blood glucose monitoring     200 strip    USE 1 STRIP TO TEST TWICE DAILY.     Uncontrolled type 1 diabetes mellitus with stage 3 chronic kidney disease (H)       furosemide 20 MG tablet    LASIX    30 tablet    Take 1 tablet (20 mg) by mouth daily    Multiple myeloma not having achieved remission (H)       gemfibrozil 600 MG tablet    LOPID    180 tablet    TAKE 1 TABLET BY MOUTH TWICE DAILY    Hyperlipidemia       glipiZIDE 10 MG tablet    GLUCOTROL    180 tablet    TAKE 1 TABLET BY MOUTH TWICE DAILY BEFORE A MEAL    Type 2 diabetes mellitus with stage 3 chronic kidney disease, with long-term current use of insulin (H)       insulin aspart 100 UNIT/ML injection    NovoLOG PEN    12 mL    Inject 4 Units Subcutaneous 3 times daily (with meals) Before meal correction: 140-225 - add 1 unit  226-310 - add 2 units 311-395 - add 3 units 396-480 - add 4 units             >480 - add 5 units >481 - take 6 units Bedtime correction of: 200-299 give 1 unit 300-399 take 2 units 400 or greater take 3 units On Wed-Thurs after chemo, add an additional 2 units to sliding scale.    Uncontrolled type 2 diabetes mellitus with hyperglycemia, unspecified long term insulin use status (H)       insulin detemir 100 UNIT/ML injection    LEVEMIR FLEXPEN/FLEXTOUCH    15 mL    Inject 14 Units Subcutaneous every morning (before breakfast)    Type 2 diabetes mellitus with diabetic chronic kidney disease, unspecified CKD stage, unspecified long term insulin use status (H)       levothyroxine 25 MCG tablet    SYNTHROID/LEVOTHROID    90 tablet    TAKE 1 TABLET BY MOUTH EVERY DAY.    Acquired hypothyroidism       lisinopril 30 MG tablet    PRINIVIL,ZESTRIL    90 tablet    TAKE 1 TABLET BY MOUTH EVERY DAY    Essential hypertension, benign       metoprolol 25 MG tablet    LOPRESSOR    180 tablet    TAKE 1 TABLET BY MOUTH TWICE DAILY    Essential hypertension       omeprazole 20 MG CR capsule    priLOSEC    90 capsule    TAKE 1 CAPSULE BY MOUTH EVERY DAY    Congenital hiatus hernia       ondansetron 8 MG tablet    ZOFRAN    10  tablet    Take 1 tablet (8 mg) by mouth every 8 hours as needed (Nausea/Vomiting)    Multiple myeloma not having achieved remission (H)       polyethylene glycol powder    MIRALAX/GLYCOLAX          prochlorperazine 5 MG tablet    COMPAZINE    30 tablet    Take 1 tablet (5 mg) by mouth every 6 hours as needed (Nausea/Vomiting)    Multiple myeloma not having achieved remission (H)       Selenium 200 MCG Tabs      Take 100 mcg by mouth daily. Pt takes one half tab of the 200 mcg daily        tamsulosin 0.4 MG capsule    FLOMAX    90 capsule    TAKE 1 CAPSULE BY MOUTH DAILY    Malignant neoplasm of prostate (H)       VITAMIN C PO      Take 1,000 mg by mouth daily        VITAMIN D3 PO      Take 1,000 Units by mouth daily        * Notice:  This list has 4 medication(s) that are the same as other medications prescribed for you. Read the directions carefully, and ask your doctor or other care provider to review them with you.

## 2017-09-06 NOTE — PROGRESS NOTES
Infusion Nursing Note:  Roc Parkinson presents today for Zometa and Aranesp.    Patient seen by provider today: Yes: Dr. Quinn   present during visit today: Not Applicable.    Note: Zometa changed to q6 weeks.    Intravenous Access:  Labs drawn without difficulty.  Peripheral IV placed.    Treatment Conditions:  Lab Results   Component Value Date    HGB 9.1 09/06/2017     Lab Results   Component Value Date    WBC 4.3 09/06/2017      Lab Results   Component Value Date    ANEU 3.8 09/06/2017     Lab Results   Component Value Date     09/06/2017      Lab Results   Component Value Date     09/06/2017                   Lab Results   Component Value Date    POTASSIUM 5.0 09/06/2017           No results found for: MAG         Lab Results   Component Value Date    CR 1.14 09/06/2017                   Lab Results   Component Value Date    MICHELLE 9.1 09/06/2017                Lab Results   Component Value Date    BILITOTAL 0.4 09/06/2017           Lab Results   Component Value Date    ALBUMIN 3.2 09/06/2017                    Lab Results   Component Value Date    ALT 16 09/06/2017           Lab Results   Component Value Date    AST 12 09/06/2017     Results reviewed, labs MET treatment parameters, ok to proceed with treatment.          Post Infusion Assessment:Patient tolerated infusion without incident.  Site patent and intact, free from redness, edema or discomfort.  No evidence of extravasations.  Access discontinued per protocol.    Discharge Plan:   Discharge instructions reviewed with: Patient.  Patient and/or family verbalized understanding of discharge instructions and all questions answered.  Copy of AVS reviewed with patient and/or family.  Patient will return 9/27/2017 for next appointment.  Patient discharged in stable condition accompanied by: self.  Departure Mode: Ambulatory.    Patsy Singh RN

## 2017-09-06 NOTE — PROGRESS NOTES
"Oncology Rooming Note    September 6, 2017 1:14 PM   Roc Parkinson is a 89 year old male who presents for:    Chief Complaint   Patient presents with     Oncology Clinic Visit     MDS     Initial Vitals: /73  Pulse 77  Resp 16  Wt 93.3 kg (205 lb 9.6 oz)  BMI 29.5 kg/m2 Estimated body mass index is 29.5 kg/(m^2) as calculated from the following:    Height as of 8/15/17: 1.778 m (5' 10\").    Weight as of this encounter: 93.3 kg (205 lb 9.6 oz). Body surface area is 2.15 meters squared.  Data Unavailable Comment: Data Unavailable   No LMP for male patient.  Allergies reviewed: Yes  Medications reviewed: Yes    Medications: Medication refills not needed today.  Pharmacy name entered into [employer]:    [facility] [address] AT [facility] [place] [facility] & [address] [place] [address] PHARMACY [place], [place] - [address]    Clinical concerns: None                    4 minutes for nursing intake (face to face time)     Kiya Drake MA    DISCHARGE PLAN:  Next appointments: See patient instruction section.  Brought the patient to [facility] IT gave report to KENDALL Hassan.  The patient has future appointments scheduled which needed switching around.  KENDALL Ware will work with scheduling to get the right appointments dates set up for the patient.  Departure Mode: Ambulatory  Accompanied by: self  5 minutes for nursing discharge (face to face time)   Kiya Drake MA RN NOTE:     1.) Patient aware and pharmacy updated about oral chemo and dex  2.) Will CHANGE ZOMETA TO EVERY 6 WEEKS ( NELLI will do ) AFTER HE GETS TODAYS 4 week dose of zometa  3.) Continue Aranesp every 3 weeks  4.) RTC in 6 weeks per Dr. Quinn   5.) KENDALL Hassan/ Rojelio aysha these    Dates to arrange:    Cancel 10/4/17 Zometa    9/27/17 Labs aranesp..  10/18/17: Aransep/ Zometa / Dr. Quinn    Given to scheduling to arrange and bring a copy of future appts to him  Jeanie Padgett RN    "

## 2017-09-07 NOTE — MR AVS SNAPSHOT
After Visit Summary   9/7/2017    Roc Parkinson    MRN: [id number]           Patient Information     Date Of Birth          7/7/1926        Visit Information        Provider Department      9/7/2017 10:30 AM  DIABETIC ED RESOURCE [facility]        Care Instructions    Reminders:    Take Novolog with each meal:  8 units at breakfast  7 units at lunch  7 units at dinner    AND take extra Novolog if blood sugar is over 140:    If blood sugar is                 Take Novolog  140-175                                1 unit extra     176-210                                2 units extra     211-245                                3 units extra     246-280                                4 units extra     281-315                                5 units extra     316-350                                6 units extra     351-385                                7 units extra     386-420                                8 units extra     Over 420                               9 units extra     Please call if your blood sugar is under 65 3 times.                Follow-ups after your visit        Your next 10 appointments already scheduled     Sep 26, 2017  9:30 AM CDT   Diabetic Education with  DIABETIC ED RESOURCE   [facility]    [address] [address]   [phone]            Sep 27, 2017 10:00 AM CDT   Level 1 with  INFUSION CHAIR 15   [facility] ([facility])    [facility]  [address]  [facility] [address]   [phone]            Oct 18, 2017 10:30 AM CDT   Level 2 with  INFUSION CHAIR 18   [facility] ([facility])    [facility]  [address]  [facility] [address]   [phone]            Oct 18, 2017 11:00 AM CDT   Return Visit  with Gretel Quinn MD   [facility])    [facility]  [address]  [place] [address] [phone]              Who to contact     If you have questions or need follow up information about today's clinic visit or your schedule please contact [facility] directly at [phone].  Normal or non-critical lab and imaging results will be communicated to you by MyChart, letter or phone within 4 business days after the clinic has received the results. If you do not hear from us within 7 days, please contact the clinic through MyChart or phone. If you have a critical or abnormal lab result, we will notify you by phone as soon as possible.  Submit refill requests through [employer] or call your pharmacy and they will forward the refill request to us. Please allow 3 business days for your refill to be completed.          Additional Information About Your Visit        Care EveryWhere ID     This is your Care EveryWhere ID. This could be used by other organizations to access your [facility] medical records  [id number]         Blood Pressure from Last 3 Encounters:   09/06/17 152/73   09/06/17 152/73   08/16/17 139/68    Weight from Last 3 Encounters:   09/06/17 93.3 kg (205 lb 9.6 oz)   09/06/17 93.3 kg (205 lb 9.6 oz)   08/16/17 90.7 kg (200 lb)              Today, you had the following     No orders found for display       Primary Care Provider Office Phone # Fax #    Dickson Clark Reich [phone] [address]  [facility] [address]        Equal Access to Services     SANDRA GAGNON : Hadii aad steve hsieh Soaimee, waaxda luqadaha, qaybta kaalmada paddy, melita shaw . So [facility] [phone].    ATENCIÓN: Si habla español, tiene a nolan disposición servicios gratuitos de asistencia lingüística. Llame al [phone].    We comply with applicable federal civil rights laws and  [place] laws. We do not discriminate on the basis of race, color, national origin, age, disability sex, sexual orientation or gender identity.            Thank you!     Thank you for choosing [facility]  for your care. Our goal is always to provide you with excellent care. Hearing back from our patients is one way we can continue to improve our services. Please take a few minutes to complete the written survey that you may receive in the mail after your visit with us. Thank you!             Your Updated Medication List - Protect others around you: Learn how to safely use, store and throw away your medicines at www.disposemymeds.org.          This list is accurate as of: 9/7/17 11:21 AM.  Always use your most recent med list.                   Brand Name Dispense Instructions for use Diagnosis    acyclovir 400 MG tablet    ZOVIRAX    60 tablet    Take 1 tablet (400 mg) by mouth 2 times daily Viral Prophylaxis.    Multiple myeloma not having achieved remission (H)       amLODIPine 5 MG tablet    NORVASC    30 tablet    TAKE 1 TABLET BY MOUTH DAILY    Essential hypertension, benign       * B-D U/F 31G X 8 MM   Generic drug:  insulin pen needle           * insulin pen needle 31G X 8 MM    B-D U/F    100 each    Use 5 pen needles daily or as directed.    Type 2 diabetes mellitus with stage 3 chronic kidney disease, with long-term current use of insulin (H)       * cyclophosphamide 50 MG capsule CHEMO    CYTOXAN    24 capsule    Take 6 capsules (300 mg) by mouth once a week    Multiple myeloma not having achieved remission (H)       * cyclophosphamide 50 MG capsule CHEMO    CYTOXAN    24 capsule    Take 6 capsules (300 mg) by mouth once a week    Multiple myeloma not having achieved remission (H)       dexamethasone 4 MG tablet    DECADRON    40 tablet    Take 10 tablets (40 mg) by mouth every 7 days for 4 doses Take daily on Days 1, 8, 15, and 22.    Multiple myeloma not having achieved  remission (H)       ferrous sulfate 325 (65 FE) MG tablet    IRON     Take 325 mg by mouth daily (with breakfast)        FREESTYLE LITE test strip   Generic drug:  blood glucose monitoring     200 strip    USE 1 STRIP TO TEST TWICE DAILY.    Uncontrolled type 1 diabetes mellitus with stage 3 chronic kidney disease (H)       furosemide 20 MG tablet    LASIX    30 tablet    Take 1 tablet (20 mg) by mouth daily    Multiple myeloma not having achieved remission (H)       gemfibrozil 600 MG tablet    LOPID    180 tablet    TAKE 1 TABLET BY MOUTH TWICE DAILY    Hyperlipidemia       glipiZIDE 10 MG tablet    GLUCOTROL    180 tablet    TAKE 1 TABLET BY MOUTH TWICE DAILY BEFORE A MEAL    Type 2 diabetes mellitus with stage 3 chronic kidney disease, with long-term current use of insulin (H)       insulin aspart 100 UNIT/ML injection    NovoLOG PEN    12 mL    Inject 4 Units Subcutaneous 3 times daily (with meals) Before meal correction: 140-225 - add 1 unit  226-310 - add 2 units 311-395 - add 3 units 396-480 - add 4 units             >480 - add 5 units >481 - take 6 units Bedtime correction of: 200-299 give 1 unit 300-399 take 2 units 400 or greater take 3 units On Wed-Thurs after chemo, add an additional 2 units to sliding scale.    Uncontrolled type 2 diabetes mellitus with hyperglycemia, unspecified long term insulin use status (H)       insulin detemir 100 UNIT/ML injection    LEVEMIR FLEXPEN/FLEXTOUCH    15 mL    Inject 14 Units Subcutaneous every morning (before breakfast)    Type 2 diabetes mellitus with diabetic chronic kidney disease, unspecified CKD stage, unspecified long term insulin use status (H)       levothyroxine 25 MCG tablet    SYNTHROID/LEVOTHROID    90 tablet    TAKE 1 TABLET BY MOUTH EVERY DAY.    Acquired hypothyroidism       lisinopril 30 MG tablet    PRINIVIL,ZESTRIL    90 tablet    TAKE 1 TABLET BY MOUTH EVERY DAY    Essential hypertension, benign       metoprolol 25 MG tablet    LOPRESSOR    180  tablet    TAKE 1 TABLET BY MOUTH TWICE DAILY    Essential hypertension       omeprazole 20 MG CR capsule    priLOSEC    90 capsule    TAKE 1 CAPSULE BY MOUTH EVERY DAY    Congenital hiatus hernia       ondansetron 8 MG tablet    ZOFRAN    10 tablet    Take 1 tablet (8 mg) by mouth every 8 hours as needed (Nausea/Vomiting)    Multiple myeloma not having achieved remission (H)       polyethylene glycol powder    MIRALAX/GLYCOLAX          prochlorperazine 5 MG tablet    COMPAZINE    30 tablet    Take 1 tablet (5 mg) by mouth every 6 hours as needed (Nausea/Vomiting)    Multiple myeloma not having achieved remission (H)       Selenium 200 MCG Tabs      Take 100 mcg by mouth daily. Pt takes one half tab of the 200 mcg daily        tamsulosin 0.4 MG capsule    FLOMAX    90 capsule    TAKE 1 CAPSULE BY MOUTH DAILY    Malignant neoplasm of prostate (H)       VITAMIN C PO      Take 1,000 mg by mouth daily        VITAMIN D3 PO      Take 1,000 Units by mouth daily        * Notice:  This list has 4 medication(s) that are the same as other medications prescribed for you. Read the directions carefully, and ask your doctor or other care provider to review them with you.

## 2017-09-07 NOTE — PROGRESS NOTES
Oral Chemotherapy Monitoring Program    Primary Oncologist: Kai  Primary Oncology Clinic: [facility]  Cancer Diagnosis: Multiple Myeloma    Therapy History:  4/20/17    Drug Interaction Assessment: None identified    Lab Monitoring Plan  C1D1+   CMP, CBC C2D1+ Call, CMP, CBC C3D1+ Call, CMP, CBC C4D1+ Call, CMP, CBC C5D1+ Call, CMP, CBC C6D1+ Call, CMP, CBC   C1D8+  C2D8+  C3D8+  C4D8+  C5D8+  C6D8+    C1D15+ Call C2D15+  C3D15+  C4D15+  C5D15+  C6D15+    C1D22+  C2D22+  C3D22+  C4D22+  C5D22+  C6D22+    CBC monthly  CMP monthly     Subjective/Objective:  Roc Parkinson is a 89 year old male seen in clinic on 9/6/17 for a follow-up visit for oral chemotherapy.  Patient reports that he has been taking both Cytoxan and Dexamethasone as ordered and has not missed any doses. He still has 1 more dose left for next week. He denies any side effects at this time.    ORAL CHEMOTHERAPY 4/12/2017 5/10/2017 6/14/2017 8/16/2017 9/7/2017   Drug Name Cytoxan (Cyclophsphamide) Cytoxan (Cyclophsphamide) Cytoxan (Cyclophsphamide) Cytoxan (Cyclophsphamide) Cytoxan (Cyclophsphamide)   Current Dosage 300mg 300mg 300mg 300mg 300mg   Current Schedule Weekly Weekly Weekly Weekly Weekly   Cycle Details Continuous Continuous Continuous Continuous Continuous   Start Date of Last Cycle - 4/20/2017 6/5/2017 - -   Planned next cycle start date 4/19/2017 - 7/3/2017 - -   Doses missed in last 2 weeks - 0 0 0 0   Adherence Assessment - Adherent Adherent Adherent Adherent   Adverse Effects - No AE identified during assessment No AE identified during assessment No AE identified during assessment No AE identified during assessment   Home BPs - - - all BPs<140/90 -   Any new drug interactions? No No - No No   Is the dose as ordered appropriate for the patient? Yes Yes - Yes Yes   Is the patient currently in pain? - Assessed in last 30 days. - No Assessed in last 30 days.   Has the patient been assessed within the past 6 months for depression? - - -  "Yes Yes   Has the patient missed any days of school, work, or other routine activity? - - - No No       Last PHQ-2 Score on record:   PHQ-2 ( 1999 [employer]) 8/15/2017 8/7/2017   Q1: Little interest or pleasure in doing things 0 0   Q2: Feeling down, depressed or hopeless 0 0   PHQ-2 Score 0 0           Vitals:  BP:   BP Readings from Last 1 Encounters:   09/06/17 152/73     Wt Readings from Last 1 Encounters:   09/06/17 93.3 kg (205 lb 9.6 oz)     Estimated body surface area is 2.15 meters squared as calculated from the following:    Height as of 8/15/17: 1.778 m (5' 10\").    Weight as of an earlier encounter on 9/6/17: 93.3 kg (205 lb 9.6 oz).    Labs:  Lab Results   Component Value Date     09/06/2017      Lab Results   Component Value Date    POTASSIUM 5.0 09/06/2017     Lab Results   Component Value Date    MICHELLE 9.1 09/06/2017     Lab Results   Component Value Date    ALBUMIN 3.2 09/06/2017     No results found for: MAG  No results found for: PHOS  Lab Results   Component Value Date    BUN 36 09/06/2017     Lab Results   Component Value Date    CR 1.14 09/06/2017       Lab Results   Component Value Date    AST 12 09/06/2017     Lab Results   Component Value Date    ALT 16 09/06/2017     Lab Results   Component Value Date    BILITOTAL 0.4 09/06/2017       Lab Results   Component Value Date    WBC 4.3 09/06/2017     Lab Results   Component Value Date    HGB 9.1 09/06/2017     Lab Results   Component Value Date     09/06/2017     Lab Results   Component Value Date    ANEU 3.8 09/06/2017           Assessment:  Labs drawn on 9/6/17 meet parameters to continue treatment. Patient is tolerating regimen.    Plan:  No change in plan    Follow-Up:  3 weeks for labs.    Refill Due:  Before 9/13/17    Alvarez GutierrezD.  "

## 2017-09-07 NOTE — PATIENT INSTRUCTIONS
Reminders:    Take Novolog with each meal:  8 units at breakfast  7 units at lunch  7 units at dinner    AND take extra Novolog if blood sugar is over 140:    If blood sugar is                 Take Novolog  140-175                                1 unit extra     176-210                                2 units extra     211-245                                3 units extra     246-280                                4 units extra     281-315                                5 units extra     316-350                                6 units extra     351-385                                7 units extra     386-420                                8 units extra     Over 420                               9 units extra     Please call if your blood sugar is under 65 3 times.

## 2017-09-11 NOTE — PROGRESS NOTES
"Diabetes Follow-up    Subjective/Objective:    Roc Parkinson sent in blood glucose log for review. Last date of communication was: 8/30.    Diabetes is being managed with Oral Medications: Glipizide - Dose: 5 mg, Time: dinner, Injectable Medications: Levemir 14-0-0-0 and NovoLog Insulin 5-6-6-0    BG Log since 8/30 (Wed) changes:   Checks are done at 8:30, 11:30, 4:30, & 10:30.      Assessment:    Fasting blood glucose: 63% in target.  Before lunch glucose: 0% in target.  Before dinner glucose: 14% in target.  Bedtime glucose: 57% in target.    Roc does report a couple of readings in the 70's overnight which he treated, he did not record which days. This may have resulted in high \"fasting\" readings.     He takes a course of chemo &/or steroid pills on Wednesday mornings which appears to increase BG readings by the end of the day and increase Thursday numbers.   He may need increased dosing on Wed/Thurs, however, he does have sliding scale.     Review of BG and sliding scale shows he has not followed recommendation 100%, Roc says he was probably using the old sliding scale sometimes. He did not look at the guidelines.  Applying the doses he took indicates he would benefit from using the new, slightly more aggressive sliding scale.   Reviewed the new sliding scale and practiced together. It was printed on [employer] last appt and today. Also printed a copy of sliding scale and taped it to an index card, he will keep this guide with his meter in the cupboard and use it every time he checks.     Plan/Response:  See Patient Instructions for co-developed, patient-stated behavior change goals.  Recommend increase to insulin - Based on readings, increase mealtime Novolog to 8-7-7-0 and use updated sliding scale from 8/30.  Continue Levemir 14-0-0-0.   Continue 5 mg Glipizide with dinner.    Return 9/26 so we can assess whether he needs a different plan on Wed/Thursday if numbers are consistently high.   Strongly " How Severe Is Your Skin Lesion?: mild Has Your Skin Lesion Been Treated?: not been treated encouraged follow the dosing directions exactly to assess results.     Kalyn Almanzar RD, LD, CDE      Any diabetes medication dose changes were made via the CDE Protocol and Collaborative Practice Agreement with the patient's referring provider. A copy of this encounter was shared with the provider.       Is This A New Presentation, Or A Follow-Up?: Skin Lesion

## 2017-09-18 NOTE — TELEPHONE ENCOUNTER
Pen needles      Last Written Prescription Date: 1-31-17  Last Fill Quantity: -,  # refills: -   Last Office Visit with [facility] or [facility] prescribing provider: 8-15-17                                         Next 5 appointments (look out 90 days)     Oct 18, 2017 11:00 AM CDT   Return Visit with Gretel Quinn MD   [facility] ([facility])    n Medical Ctr [facility]  [address]  [facility] [address]   [phone]            Oct 23, 2017 10:30 AM CDT   PHYSICAL with Dickson Reich MD   [facility] ([facility])    [address] [place] [id number] [phone]                    Prescription approved per [facility] Refill Protocol.

## 2017-09-26 NOTE — MR AVS SNAPSHOT
After Visit Summary   9/26/2017    Roc Parkinson    MRN: [id number]           Patient Information     Date Of Birth          7/7/1926        Visit Information        Provider Department      9/26/2017 9:30 AM  DIABETIC ED RESOURCE [facility]        Care Instructions    Reminders:    1) Stop the Glipizide at supper.  2) Keep taking the Glipizide at breakfast.     3) Take Novolog with meals:      Breakfast 9 units      Lunch 8 units      Supper 7 units    IF blood sugar is over 140, use your sliding scale with every meal.        If blood sugar is                 Take Novolog  140-175                                1 unit extra      176-210                                2 units extra      211-245                                3 units extra      246-280                                4 units extra      281-315                                5 units extra      316-350                                6 units extra      351-385                                7 units extra      386-420                                8 units extra      Over 420                               9 units extra          4) Keep taking Levemir 14 units in the morning.     5) On Wednesday and Thursday ONLY, take 16 units Levemir.    See you on October 11 at 9:30!              Follow-ups after your visit        Your next 10 appointments already scheduled     Sep 27, 2017 10:00 AM CDT   Level 1 with  INFUSION CHAIR 15   [facility] ([facility])    n Medical Ctr [facility]  [address]  [facility] [address]   [phone]            Oct 11, 2017  9:30 AM CDT   Diabetic Education with  DIABETIC ED RESOURCE   [facility] ([facility])    [address] [id number] [phone]            Oct 18, 2017 10:30 AM CDT   Level 2 with  INFUSION CHAIR 18   [facility] ([facility])    [facility] [place]  [address]  [place] [address]   [phone]            Oct 18, 2017 11:00 AM CDT   Return Visit with Gretel Quinn MD   [facility] ([facility])    [facility] [place]  [address]  [place] [address]   [phone]            Oct 23, 2017 10:30 AM CDT   PHYSICAL with Dickson Reich MD   [facility]    [address] [place] [address] [address]  [facility] [address] [phone]            Nov 07, 2017  9:30 AM CST   Diabetic Education with  DIABETIC ED RESOURCE   [facility]    [address] [id number] [phone]              Who to contact     If you have questions or need follow up information about today's clinic visit or your schedule please contact [facility] directly at [phone].  Normal or non-critical lab and imaging results will be communicated to you by TE2hart, letter or phone within 4 business days after the clinic has received the results. If you do not hear from us within 7 days, please contact the clinic through TE2hart or phone. If you have a critical or abnormal lab result, we will notify you by phone as soon as possible.  Submit refill requests through [employer] or call your pharmacy and they will forward the refill request to us. Please allow 3 business days for your refill to be completed.          Additional Information About Your Visit        [employer] Information     [employer] lets you send messages to your doctor, view your test results, renew your prescriptions, schedule appointments and more. To sign up, go to [email or web address] . Click on \"Log in\" on the left side of the screen, which will take you to the Welcome page. Then click on " "\"Sign up Now\" on the right side of the page.     You will be asked to enter the access code listed below, as well as some personal information. Please follow the directions to create your username and password.     Your access code is: [email or web address]  Expires: 2017 10:12 AM     Your access code will  in 90 days. If you need help or a new code, please call your [facility] clinic or [phone].        Care EveryWhere ID     This is your Care EveryWhere ID. This could be used by other organizations to access your [facility] medical records  [id number]         Blood Pressure from Last 3 Encounters:   17 152/73   17 152/73   17 139/68    Weight from Last 3 Encounters:   17 93.3 kg (205 lb 9.6 oz)   17 93.3 kg (205 lb 9.6 oz)   17 90.7 kg (200 lb)              Today, you had the following     No orders found for display       Primary Care Provider Office Phone # Fax #    Dickson Reich [phone]       [address] [facility] [address]        Equal Access to Services     ELIE GAGNON : Hadii aad ku hadasho Soomaali, waaxda luqadaha, qaybta kaalmada adeegyada, waxay wilton haysbn constantine rodriguez. So [facility] [phone].    ATENCIÓN: Si habla español, tiene a nolan disposición servicios gratuitos de asistencia lingüística. Llame al [phone].    We comply with applicable federal civil rights laws and [place] laws. We do not discriminate on the basis of race, color, national origin, age, disability sex, sexual orientation or gender identity.            Thank you!     Thank you for choosing [facility]  for your care. Our goal is always to provide you with excellent care. Hearing back from our patients is one way we can continue to improve our services. Please take a few minutes to complete the written survey that you may receive in the mail after your visit with us. Thank you!             Your Updated Medication List - " Protect others around you: Learn how to safely use, store and throw away your medicines at www.disposemymeds.org.          This list is accurate as of: 9/26/17 10:12 AM.  Always use your most recent med list.                   Brand Name Dispense Instructions for use Diagnosis    acyclovir 400 MG tablet    ZOVIRAX    60 tablet    Take 1 tablet (400 mg) by mouth 2 times daily Viral Prophylaxis.    Multiple myeloma not having achieved remission (H)       amLODIPine 5 MG tablet    NORVASC    30 tablet    TAKE 1 TABLET BY MOUTH DAILY    Essential hypertension, benign       * B-D U/F 31G X 8 MM   Generic drug:  insulin pen needle           * B-D U/F 31G X 8 MM   Generic drug:  insulin pen needle     500 each    USE 5 PEN NEEDLES PER DAY OR AS DIRECTED    Type 2 diabetes mellitus with stage 3 chronic kidney disease, with long-term current use of insulin (H)       * cyclophosphamide 50 MG capsule CHEMO    CYTOXAN    24 capsule    Take 6 capsules (300 mg) by mouth once a week    Multiple myeloma not having achieved remission (H)       * cyclophosphamide 50 MG capsule CHEMO    CYTOXAN    24 capsule    Take 6 capsules (300 mg) by mouth once a week    Multiple myeloma not having achieved remission (H)       dexamethasone 4 MG tablet    DECADRON    40 tablet    Take 10 tablets (40 mg) by mouth every 7 days for 4 doses Take daily on Days 1, 8, 15, and 22.    Multiple myeloma not having achieved remission (H)       ferrous sulfate 325 (65 FE) MG tablet    IRON     Take 325 mg by mouth daily (with breakfast)        FREESTYLE LITE test strip   Generic drug:  blood glucose monitoring     200 strip    USE 1 STRIP TO TEST TWICE DAILY.    Uncontrolled type 1 diabetes mellitus with stage 3 chronic kidney disease (H)       furosemide 20 MG tablet    LASIX    30 tablet    Take 1 tablet (20 mg) by mouth daily    Multiple myeloma not having achieved remission (H)       gemfibrozil 600 MG tablet    LOPID    180 tablet    TAKE 1 TABLET BY  MOUTH TWICE DAILY    Hyperlipidemia       glipiZIDE 10 MG tablet    GLUCOTROL    180 tablet    TAKE 1 TABLET BY MOUTH TWICE DAILY BEFORE A MEAL    Type 2 diabetes mellitus with stage 3 chronic kidney disease, with long-term current use of insulin (H)       insulin aspart 100 UNIT/ML injection    NovoLOG PEN    12 mL    Inject 8 Units Subcutaneous 3 times daily (with meals) Before meal correction: 140-175 add 1 unit  176-210 add 2 units  211-245 add 3 units  246-280 add 4 units  281-315 add 5 units 316-350 add 6 units  351-385 add 7 units 386-420 add 8 units  Over 420 add 9 units    Uncontrolled type 2 diabetes mellitus with hyperglycemia, unspecified long term insulin use status (H)       insulin detemir 100 UNIT/ML injection    LEVEMIR FLEXPEN/FLEXTOUCH    15 mL    Inject 14 Units Subcutaneous every morning (before breakfast)    Type 2 diabetes mellitus with diabetic chronic kidney disease, unspecified CKD stage, unspecified long term insulin use status (H)       levothyroxine 25 MCG tablet    SYNTHROID/LEVOTHROID    90 tablet    TAKE 1 TABLET BY MOUTH EVERY DAY.    Acquired hypothyroidism       lisinopril 30 MG tablet    PRINIVIL,ZESTRIL    90 tablet    TAKE 1 TABLET BY MOUTH EVERY DAY    Essential hypertension, benign       metoprolol 25 MG tablet    LOPRESSOR    180 tablet    TAKE 1 TABLET BY MOUTH TWICE DAILY    Essential hypertension       omeprazole 20 MG CR capsule    priLOSEC    90 capsule    TAKE 1 CAPSULE BY MOUTH EVERY DAY    Congenital hiatus hernia       ondansetron 8 MG tablet    ZOFRAN    10 tablet    Take 1 tablet (8 mg) by mouth every 8 hours as needed (Nausea/Vomiting)    Multiple myeloma not having achieved remission (H)       polyethylene glycol powder    MIRALAX/GLYCOLAX          prochlorperazine 5 MG tablet    COMPAZINE    30 tablet    Take 1 tablet (5 mg) by mouth every 6 hours as needed (Nausea/Vomiting)    Multiple myeloma not having achieved remission (H)       Selenium 200 MCG Tabs       Take 100 mcg by mouth daily. Pt takes one half tab of the 200 mcg daily        tamsulosin 0.4 MG capsule    FLOMAX    90 capsule    TAKE 1 CAPSULE BY MOUTH DAILY    Malignant neoplasm of prostate (H)       VITAMIN C PO      Take 1,000 mg by mouth daily        VITAMIN D3 PO      Take 1,000 Units by mouth daily        * Notice:  This list has 4 medication(s) that are the same as other medications prescribed for you. Read the directions carefully, and ask your doctor or other care provider to review them with you.

## 2017-09-26 NOTE — PATIENT INSTRUCTIONS
Reminders:    1) Stop the Glipizide at supper.  2) Keep taking the Glipizide at breakfast.     3) Take Novolog with meals:      Breakfast 9 units      Lunch 8 units      Supper 7 units    IF blood sugar is over 140, use your sliding scale with every meal.        If blood sugar is                 Take Novolog  140-175                                1 unit extra      176-210                                2 units extra      211-245                                3 units extra      246-280                                4 units extra      281-315                                5 units extra      316-350                                6 units extra      351-385                                7 units extra      386-420                                8 units extra      Over 420                               9 units extra          4) Keep taking Levemir 14 units in the morning.     5) On Wednesday and Thursday ONLY, take 16 units Levemir.    See you on October 11 at 9:30!

## 2017-09-26 NOTE — PROGRESS NOTES
Diabetes Follow-up    Subjective/Objective:    Roc Parkinson brings in blood glucose log for review. Last date of communication was: office visit 09/07/17.    Diabetes is being managed with Oral Medications: Glipizide - Dose: 10 mg with breakfast and 5mg with evening supper. Injectable Medications: Levemir 14-0-0-0.  Novolog 8-7-7-0 And sliding scale with meals:    If blood sugar is                 Take Novolog  140-175                                1 unit extra   176-210                                2 units extra  211-245                                3 units extra    246-280                                4 units extra   281-315                                5 units extra   316-350                                6 units extra   351-385                                7 units extra    386-420                                8 units extra  Over 420                               9 units extra        BG/Food Log:       Assessment:    Fasting blood glucose: 63% in target.  Before lunch glucose: 13% in target.  Before dinner glucose: 36% in target.  Bedtime glucose: 32% in target (<140 correction).    Roc expresses that he is pleased with his numbers, feels they are much improved since our initial meeting.   He is doing well with record keeping as directed.   NOTE Wednesday morning he takes 6 chemo pills before breakfast. They have consistently raised BG on Wednesday and Thursday. Glucose readings begin to decrease Thursday evening.   Will increase Levemir insulin by 2 units on Wednesday and Thursday only.    Using current set dose at meals + sliding scale, BG's tend to increase between breakfast and lunch, and between lunch and dinner.   Roc voices that he does not snack much during the day, but may eat something on occasion. Has been asked to record these instances, but has not written it down. Will increase breakfast and lunch set dose today.     Roc reports, but has not recorded, hypoglycemia  "\"almost every night\" in the 70's that wakes him from sleep about 2 AM. He eats a few chocolate candies to resolve this. He has not recorded these events, so wonders if some of the higher morning readings are after the nocturnal snack. Has been asked to record. In light of reported low BG, recommend discontinue Glipizide 5 mg at dinner.     He does eat an HS snack of apple and cheese, is covering and correcting at that time.    Plan/Response:  See Patient Instructions for co-developed, patient-stated behavior change goals.  Keep a blood glucose record for next visit.  Recommend change to oral medication regimen - discontinue supper time Glipizide 5 mg to reduce hypoglycemia risk overnight.  Recommend increase to insulin - Increase to meal time Novolog insulin: breakfast take 9 units (from 8), lunch take 8 units (from 7), dinner continue 7 units.  Recommend increase to Levemir insulin on Wednesday and Thursday morning to 16 units (from 14) during chemo/steroid impact.    Kalyn Almanzar RD, LD, CDE      Any diabetes medication dose changes were made via the CDE Protocol and Collaborative Practice Agreement with the patient's referring provider. A copy of this encounter was shared with the provider.      "

## 2017-09-26 NOTE — Clinical Note
Dr. Reich,  Seeing improvement in blood sugar readings!  Have made minor insulin adjustments per protocol.  Recommend discontinue Glipizide at dinner (he has been taking 5 mg) due to nocturnal hypoglycemia.   Can you please indicate if you are agreeable to discontinuing the dinner Glipizide at dinner?  Thank you! Kalyn Almanzar RD, LD, CDE

## 2017-09-27 NOTE — PROGRESS NOTES
Oral Chemotherapy Monitoring Program.    Patient currently on cytoxan therapy.    Reviewed labs from 9/27/17    No concerning abnormalities. Labs are comparable and stable to previous labs.    Will follow up in 4 weeks with repeat labs and check in.    Maria Eugenia Cortes PharmD  September 27, 2017

## 2017-09-27 NOTE — PROGRESS NOTES
Infusion Nursing Note:  Roc SILVA Parkinson presents today for labs and possible aranesp.    Patient seen by provider today: No   present during visit today: Not Applicable.    Note: Has been feeling well..    Intravenous Access:  Lab draw site Left AC, Needle type Butterfly, Gauge #23.  Labs drawn without difficulty.    Treatment Conditions:  Lab Results   Component Value Date    HGB 9.4 09/27/2017     Lab Results   Component Value Date    WBC 3.8 09/27/2017      Lab Results   Component Value Date    ANEU 3.3 09/27/2017     Lab Results   Component Value Date     09/27/2017      Lab Results   Component Value Date     09/06/2017                   Lab Results   Component Value Date    POTASSIUM 5.0 09/06/2017           No results found for: MAG         Lab Results   Component Value Date    CR 1.14 09/06/2017                   Lab Results   Component Value Date    MICHELLE 9.1 09/06/2017                Lab Results   Component Value Date    BILITOTAL 0.4 09/06/2017           Lab Results   Component Value Date    ALBUMIN 3.2 09/06/2017                    Lab Results   Component Value Date    ALT 16 09/06/2017           Lab Results   Component Value Date    AST 12 09/06/2017     Results reviewed, labs MET treatment parameters, ok to proceed with treatment.          Post Infusion Assessment:  Patient tolerated injection without incident.  Site patent and intact, free from redness, edema or discomfort.  Access discontinued per protocol.    Discharge Plan:   Prescription refills given for Cytoxan and decadron.  Discharge instructions reviewed with: Patient.  Patient and/or family verbalized understanding of discharge instructions and all questions answered.  Copy of AVS reviewed with patient and/or family.  Patient will return 10/18/2017 for next appointment.  Patient discharged in stable condition accompanied by: self.  Departure Mode: Ambulatory.    Dora Mcgrath RN

## 2017-09-27 NOTE — MR AVS SNAPSHOT
After Visit Summary   9/27/2017    Roc Parkinson    MRN: [id number]           Patient Information     Date Of Birth          7/7/1926        Visit Information        Provider Department      9/27/2017 10:00 AM  INFUSION CHAIR 15 [facility]        Today's Diagnoses     MDS (myelodysplastic syndrome) (H)    -  1    Multiple myeloma not having achieved remission (H)           Follow-ups after your visit        Follow-up notes from your care team     Return in about 3 weeks (around 10/18/2017).      Your next 10 appointments already scheduled     Oct 11, 2017  9:30 AM CDT   Diabetic Education with  DIABETIC ED RESOURCE   [facility]    [address] [id number] [phone]            Oct 18, 2017 10:30 AM CDT   Level 2 with  INFUSION CHAIR 18   [facility] ([facility])    [facility]  [address]  [facility] [address]   [phone]            Oct 18, 2017 11:00 AM CDT   Return Visit with Gretel Quinn MD   [facility] ([facility])    [facility]  [address]  [facility] [address]   [phone]            Oct 23, 2017 10:30 AM CDT   PHYSICAL with Dickson Reich MD   [facility] ([facility])    [address] [place] [address]   [phone]            Nov 07, 2017  9:30 AM CST   Diabetic Education with  DIABETIC ED RESOURCE   [facility] ([facility])    [address] [id number] [phone]              Who to contact     If you have questions or need follow up information about today's clinic visit or your schedule please contact [facility] directly at [phone].  Normal or non-critical lab and imaging results will be communicated to you by Slipstreamhart, letter or phone within 4 business days after the clinic has received the results. If you do not hear from us within 7 days, please contact the clinic through Slipstreamhart or phone. If you have a critical or abnormal lab result, we will notify you by phone as soon as possible.  Submit refill requests through Modti or call your pharmacy and they will forward the refill request to us. Please allow 3 business days for your refill to be completed.          Additional Information About Your Visit        [employer] Information     Modti lets you send messages to your doctor, view your test results, renew your prescriptions, schedule appointments and more. To sign up, go to [email or web address] . Click on \"Log in\" on the left side of the screen, which will take you to the Welcome page. Then click on \"Sign up Now\" on the right side of the page.     You will be asked to enter the access code listed below, as well as some personal information. Please follow the directions to create your username and password.     Your access code is: [email or web address]  Expires: 2017 10:12 AM     Your access code will  in 90 days. If you need help or a new code, please call your [facility] clinic or [phone].        Care EveryWhere ID     This is your Care EveryWhere ID. This could be used by other organizations to access your [facility] medical records  [id number]        Your Vitals Were     Pulse Temperature Respirations Pulse Oximetry          69 98.2  F (36.8  C) (Oral) 16 98%         Blood Pressure from Last 3 Encounters:   17 123/55   17 152/73   17 152/73    Weight from Last 3 Encounters:   17 93.3 kg (205 lb 9.6 oz)   17 93.3 kg (205 lb 9.6 oz)   17 90.7 kg (200 lb)              We Performed the Following     CBC with platelets differential     Comprehensive metabolic panel  "    Kappa and lambda light chain     Protein electrophoresis     Protein Immunofixation Serum          Today's Medication Changes          These changes are accurate as of: 9/27/17 11:08 AM.  If you have any questions, ask your nurse or doctor.               These medicines have changed or have updated prescriptions.        Dose/Directions    * cyclophosphamide 50 MG capsule CHEMO   Commonly known as:  CYTOXAN   This may have changed:  Another medication with the same name was added. Make sure you understand how and when to take each.   Used for:  Multiple myeloma not having achieved remission (H)        Dose:  300 mg   Take 6 capsules (300 mg) by mouth once a week   Quantity:  24 capsule   Refills:  0       * cyclophosphamide 50 MG capsule CHEMO   Commonly known as:  CYTOXAN   This may have changed:  Another medication with the same name was added. Make sure you understand how and when to take each.   Used for:  Multiple myeloma not having achieved remission (H)        Dose:  300 mg   Take 6 capsules (300 mg) by mouth once a week   Quantity:  24 capsule   Refills:  0       * cyclophosphamide 50 MG capsule CHEMO   Commonly known as:  CYTOXAN   This may have changed:  You were already taking a medication with the same name, and this prescription was added. Make sure you understand how and when to take each.   Used for:  Multiple myeloma not having achieved remission (H)        Dose:  300 mg   Take 6 capsules (300 mg) by mouth once a week   Quantity:  24 capsule   Refills:  0       * dexamethasone 4 MG tablet   Commonly known as:  DECADRON   This may have changed:  Another medication with the same name was added. Make sure you understand how and when to take each.   Used for:  Multiple myeloma not having achieved remission (H)        Dose:  40 mg   Take 10 tablets (40 mg) by mouth every 7 days for 4 doses Take daily on Days 1, 8, 15, and 22.   Quantity:  40 tablet   Refills:  0       * dexamethasone 4 MG tablet    Commonly known as:  DECADRON   This may have changed:  You were already taking a medication with the same name, and this prescription was added. Make sure you understand how and when to take each.   Used for:  Multiple myeloma not having achieved remission (H)        Dose:  40 mg   Take 10 tablets (40 mg) by mouth every 7 days for 4 doses Take daily on Days 1, 8, 15, and 22.   Quantity:  40 tablet   Refills:  0       * Notice:  This list has 5 medication(s) that are the same as other medications prescribed for you. Read the directions carefully, and ask your doctor or other care provider to review them with you.         Where to get your medicines      These medications were sent to [facility] [place] - [address]  [address] [facility] [place], [place] [address]     Phone:  [phone]     cyclophosphamide 50 MG capsule CHEMO    dexamethasone 4 MG tablet                Primary Care Provider Office Phone # Fax #    Dickson Reich [phone] [address] [facility] [address]        Equal Access to Services     [facility]: [facility] hadasho Soomaali, waaxda luqadaha, qaybta kaalmada ademalinda, melita shaw . So [facility] [phone].    ATENCIÓN: Si habla español, tiene a nolan disposición servicios gratuitos de asistencia lingüística. Llame al [phone].    We comply with applicable federal civil rights laws and [place] laws. We do not discriminate on the basis of race, color, national origin, age, disability sex, sexual orientation or gender identity.            Thank you!     Thank you for choosing [facility]  for your care. Our goal is always to provide you with excellent care. Hearing back from our patients is one way we can continue to improve our services. Please take a few minutes to complete the written survey that you may receive in the mail after your visit with us. Thank you!              Your Updated Medication List - Protect others around you: Learn how to safely use, store and throw away your medicines at www.disposemymeds.org.          This list is accurate as of: 9/27/17 11:08 AM.  Always use your most recent med list.                   Brand Name Dispense Instructions for use Diagnosis    acyclovir 400 MG tablet    ZOVIRAX    60 tablet    Take 1 tablet (400 mg) by mouth 2 times daily Viral Prophylaxis.    Multiple myeloma not having achieved remission (H)       amLODIPine 5 MG tablet    NORVASC    30 tablet    TAKE 1 TABLET BY MOUTH DAILY    Essential hypertension, benign       * B-D U/F 31G X 8 MM   Generic drug:  insulin pen needle           * B-D U/F 31G X 8 MM   Generic drug:  insulin pen needle     500 each    USE 5 PEN NEEDLES PER DAY OR AS DIRECTED    Type 2 diabetes mellitus with stage 3 chronic kidney disease, with long-term current use of insulin (H)       * cyclophosphamide 50 MG capsule CHEMO    CYTOXAN    24 capsule    Take 6 capsules (300 mg) by mouth once a week    Multiple myeloma not having achieved remission (H)       * cyclophosphamide 50 MG capsule CHEMO    CYTOXAN    24 capsule    Take 6 capsules (300 mg) by mouth once a week    Multiple myeloma not having achieved remission (H)       * cyclophosphamide 50 MG capsule CHEMO    CYTOXAN    24 capsule    Take 6 capsules (300 mg) by mouth once a week    Multiple myeloma not having achieved remission (H)       * dexamethasone 4 MG tablet    DECADRON    40 tablet    Take 10 tablets (40 mg) by mouth every 7 days for 4 doses Take daily on Days 1, 8, 15, and 22.    Multiple myeloma not having achieved remission (H)       * dexamethasone 4 MG tablet    DECADRON    40 tablet    Take 10 tablets (40 mg) by mouth every 7 days for 4 doses Take daily on Days 1, 8, 15, and 22.    Multiple myeloma not having achieved remission (H)       ferrous sulfate 325 (65 FE) MG tablet    IRON     Take 325 mg by mouth daily (with breakfast)         FREESTYLE LITE test strip   Generic drug:  blood glucose monitoring     200 strip    USE 1 STRIP TO TEST TWICE DAILY.    Uncontrolled type 1 diabetes mellitus with stage 3 chronic kidney disease (H)       furosemide 20 MG tablet    LASIX    30 tablet    Take 1 tablet (20 mg) by mouth daily    Multiple myeloma not having achieved remission (H)       gemfibrozil 600 MG tablet    LOPID    180 tablet    TAKE 1 TABLET BY MOUTH TWICE DAILY    Hyperlipidemia       glipiZIDE 10 MG tablet    GLUCOTROL    180 tablet    TAKE 1 TABLET BY MOUTH TWICE DAILY BEFORE A MEAL    Type 2 diabetes mellitus with stage 3 chronic kidney disease, with long-term current use of insulin (H)       insulin aspart 100 UNIT/ML injection    NovoLOG PEN    12 mL    Inject 8 Units Subcutaneous 3 times daily (with meals) Before meal correction: 140-175 add 1 unit  176-210 add 2 units  211-245 add 3 units  246-280 add 4 units  281-315 add 5 units 316-350 add 6 units  351-385 add 7 units 386-420 add 8 units  Over 420 add 9 units    Uncontrolled type 2 diabetes mellitus with hyperglycemia, unspecified long term insulin use status (H)       insulin detemir 100 UNIT/ML injection    LEVEMIR FLEXPEN/FLEXTOUCH    15 mL    Inject 14 Units Subcutaneous every morning (before breakfast) On Wednesday and Thursday morning, inject 16 units.    Type 2 diabetes mellitus with diabetic chronic kidney disease, unspecified CKD stage, unspecified long term insulin use status (H)       levothyroxine 25 MCG tablet    SYNTHROID/LEVOTHROID    90 tablet    TAKE 1 TABLET BY MOUTH EVERY DAY.    Acquired hypothyroidism       lisinopril 30 MG tablet    PRINIVIL,ZESTRIL    90 tablet    TAKE 1 TABLET BY MOUTH EVERY DAY    Essential hypertension, benign       metoprolol 25 MG tablet    LOPRESSOR    180 tablet    TAKE 1 TABLET BY MOUTH TWICE DAILY    Essential hypertension       omeprazole 20 MG CR capsule    priLOSEC    90 capsule    TAKE 1 CAPSULE BY MOUTH EVERY DAY    Congenital  hiatus hernia       ondansetron 8 MG tablet    ZOFRAN    10 tablet    Take 1 tablet (8 mg) by mouth every 8 hours as needed (Nausea/Vomiting)    Multiple myeloma not having achieved remission (H)       polyethylene glycol powder    MIRALAX/GLYCOLAX          prochlorperazine 5 MG tablet    COMPAZINE    30 tablet    Take 1 tablet (5 mg) by mouth every 6 hours as needed (Nausea/Vomiting)    Multiple myeloma not having achieved remission (H)       Selenium 200 MCG Tabs      Take 100 mcg by mouth daily. Pt takes one half tab of the 200 mcg daily        tamsulosin 0.4 MG capsule    FLOMAX    90 capsule    TAKE 1 CAPSULE BY MOUTH DAILY    Malignant neoplasm of prostate (H)       VITAMIN C PO      Take 1,000 mg by mouth daily        VITAMIN D3 PO      Take 1,000 Units by mouth daily        * Notice:  This list has 7 medication(s) that are the same as other medications prescribed for you. Read the directions carefully, and ask your doctor or other care provider to review them with you.

## 2017-10-04 NOTE — NURSING NOTE
"Chief Complaint   Patient presents with     Rectal Problem     States as anal fistula       Initial /54 (BP Location: Left arm, Patient Position: Sitting, Cuff Size: Adult Regular)  Pulse 73  Temp 98.5  F (36.9  C) (Tympanic)  Ht 5' 10\" (1.778 m)  Wt 205 lb (93 kg)  SpO2 97%  BMI 29.41 kg/m2 Estimated body mass index is 29.41 kg/(m^2) as calculated from the following:    Height as of this encounter: 5' 10\" (1.778 m).    Weight as of this encounter: 205 lb (93 kg).  Medication Reconciliation: complete     Luiza Green LPN  "

## 2017-10-04 NOTE — MR AVS SNAPSHOT
After Visit Summary   10/4/2017    Roc Parkinson    MRN: [id number]           Patient Information     Date Of Birth          7/7/1926        Visit Information        Provider Department      10/4/2017 2:30 PM Dickson Reich MD [facility]        Today's Diagnoses     Perirectal abscess    -  1    Need for prophylactic vaccination and inoculation against influenza          Care Instructions    We will get the patient an appointment with colorectal surgery to deal with this definitively.  Flu shot was given today.  Follow-up with us will otherwise be as needed.          Follow-ups after your visit        Your next 10 appointments already scheduled     Oct 11, 2017  9:30 AM CDT   Diabetic Education with  DIABETIC ED RESOURCE   [facility]    [address] [address]   [phone]            Oct 18, 2017 10:30 AM CDT   Level 2 with  INFUSION CHAIR 18   [facility] ([facility])    [facility]  [address]  [facility] [address]   [phone]            Oct 18, 2017 11:00 AM CDT   Return Visit with Gretel Quinn MD   [facility] ([facility])    [facility]  [address]  [facility] [address]   [phone]            Oct 23, 2017 10:30 AM CDT   PHYSICAL with Dickson Reich MD   [facility]    [address] [place] [address]   [phone]            Nov 07, 2017  9:30 AM CST   Diabetic Education with  DIABETIC ED RESOURCE   [facility]    [address] [id number] [phone]              Who to contact     If you have questions or need follow up  "information about today's clinic visit or your schedule please contact [facility] directly at [phone].  Normal or non-critical lab and imaging results will be communicated to you by MyChart, letter or phone within 4 business days after the clinic has received the results. If you do not hear from us within 7 days, please contact the clinic through [id number] or phone. If you have a critical or abnormal lab result, we will notify you by phone as soon as possible.  Submit refill requests through [employer] or call your pharmacy and they will forward the refill request to us. Please allow 3 business days for your refill to be completed.          Additional Information About Your Visit        [id number] Information     EpiBone lets you send messages to your doctor, view your test results, renew your prescriptions, schedule appointments and more. To sign up, go to [email or web address] . Click on \"Log in\" on the left side of the screen, which will take you to the Welcome page. Then click on \"Sign up Now\" on the right side of the page.     You will be asked to enter the access code listed below, as well as some personal information. Please follow the directions to create your username and password.     Your access code is: [email or web address]  Expires: 2017 10:12 AM     Your access code will  in 90 days. If you need help or a new code, please call your [facility] clinic or [phone].        Care EveryWhere ID     This is your Care EveryWhere ID. This could be used by other organizations to access your [facility] medical records  [id number]        Your Vitals Were     Pulse Temperature Height Pulse Oximetry BMI (Body Mass Index)       73 98.5  F (36.9  C) (Tympanic) 5' 10\" (1.778 m) 97% 29.41 kg/m2        Blood Pressure from Last 3 Encounters:   10/04/17 128/54   17 123/55   17 152/73    Weight from Last 3 Encounters:   10/04/17 205 lb (93 kg)   17 205 lb 9.6 oz (93.3 kg) "   09/06/17 205 lb 9.6 oz (93.3 kg)              We Performed the Following     ADMIN INFLUENZA (For MEDICARE Patients ONLY) []     FLU VACCINE, INCREASED ANTIGEN, PRESV FREE, AGE 65+ [63103]        Primary Care Provider Office Phone # Fax #    Dickson Reich [phone]       [address] [facility] [address] [address]        Equal Access to Services     ELIE GAGNON : Hadii aad ku hadasho Soomaali, waaxda luqadaha, qaybta kaalmada adeegyada, waxay idiin hayaan adeeg kharash la'sbn . So [facility] [phone].    ATENCIÓN: Si habla espdanuta, tiene a nolan disposición servicios gratuitos de asistencia lingüística. Llame al [phone].    We comply with applicable federal civil rights laws and [place] laws. We do not discriminate on the basis of race, color, national origin, age, disability, sex, sexual orientation, or gender identity.            Thank you!     Thank [facility]  [facility] your care. Our goal is always to provide you with excellent care. Hearing back from our patients is one way we can continue to improve our services. Please take a few minutes to complete the written survey that you may receive in the mail after your visit with us. Thank you!             Your Updated Medication List - Protect others around you: Learn how to safely use, store and throw away your medicines at www.disposemymeds.org.          This list is accurate as of: 10/4/17  2:41 PM.  Always use your most recent med list.                   Brand Name Dispense Instructions for use Diagnosis    acyclovir 400 MG tablet    ZOVIRAX    60 tablet    Take 1 tablet (400 mg) by mouth 2 times daily Viral Prophylaxis.    Multiple myeloma not having achieved remission (H)       amLODIPine 5 MG tablet    NORVASC    30 tablet    TAKE 1 TABLET BY MOUTH DAILY    Essential hypertension, benign       * B-D U/F 31G X 8 MM   Generic drug:  insulin pen needle           * B-D U/F 31G X 8 MM    Generic drug:  insulin pen needle     500 each    USE 5 PEN NEEDLES PER DAY OR AS DIRECTED    Type 2 diabetes mellitus with stage 3 chronic kidney disease, with long-term current use of insulin (H)       * cyclophosphamide 50 MG capsule CHEMO    CYTOXAN    24 capsule    Take 6 capsules (300 mg) by mouth once a week    Multiple myeloma not having achieved remission (H)       * cyclophosphamide 50 MG capsule CHEMO    CYTOXAN    24 capsule    Take 6 capsules (300 mg) by mouth once a week    Multiple myeloma not having achieved remission (H)       dexamethasone 4 MG tablet    DECADRON    40 tablet    Take 10 tablets (40 mg) by mouth every 7 days for 4 doses Take daily on Days 1, 8, 15, and 22.    Multiple myeloma not having achieved remission (H)       ferrous sulfate 325 (65 FE) MG tablet    IRON     Take 325 mg by mouth daily (with breakfast)        FREESTYLE LITE test strip   Generic drug:  blood glucose monitoring     200 strip    USE 1 STRIP TO TEST TWICE DAILY.    Uncontrolled type 1 diabetes mellitus with stage 3 chronic kidney disease (H)       furosemide 20 MG tablet    LASIX    30 tablet    Take 1 tablet (20 mg) by mouth daily    Multiple myeloma not having achieved remission (H)       gemfibrozil 600 MG tablet    LOPID    180 tablet    TAKE 1 TABLET BY MOUTH TWICE DAILY    Hyperlipidemia       glipiZIDE 10 MG tablet    GLUCOTROL    180 tablet    TAKE 1 TABLET BY MOUTH TWICE DAILY BEFORE A MEAL    Type 2 diabetes mellitus with stage 3 chronic kidney disease, with long-term current use of insulin (H)       insulin aspart 100 UNIT/ML injection    NovoLOG PEN    12 mL    Inject 8 Units Subcutaneous 3 times daily (with meals) Before meal correction: 140-175 add 1 unit  176-210 add 2 units  211-245 add 3 units  246-280 add 4 units  281-315 add 5 units 316-350 add 6 units  351-385 add 7 units 386-420 add 8 units  Over 420 add 9 units    Uncontrolled type 2 diabetes mellitus with hyperglycemia, unspecified long  term insulin use status (H)       insulin detemir 100 UNIT/ML injection    LEVEMIR FLEXPEN/FLEXTOUCH    15 mL    Inject 14 Units Subcutaneous every morning (before breakfast) On Wednesday and Thursday morning, inject 16 units.    Type 2 diabetes mellitus with diabetic chronic kidney disease, unspecified CKD stage, unspecified long term insulin use status (H)       levothyroxine 25 MCG tablet    SYNTHROID/LEVOTHROID    90 tablet    TAKE 1 TABLET BY MOUTH EVERY DAY.    Acquired hypothyroidism       lisinopril 30 MG tablet    PRINIVIL,ZESTRIL    90 tablet    TAKE 1 TABLET BY MOUTH EVERY DAY    Essential hypertension, benign       metoprolol 25 MG tablet    LOPRESSOR    180 tablet    TAKE 1 TABLET BY MOUTH TWICE DAILY    Essential hypertension       omeprazole 20 MG CR capsule    priLOSEC    90 capsule    TAKE 1 CAPSULE BY MOUTH EVERY DAY    Congenital hiatus hernia       ondansetron 8 MG tablet    ZOFRAN    10 tablet    Take 1 tablet (8 mg) by mouth every 8 hours as needed (Nausea/Vomiting)    Multiple myeloma not having achieved remission (H)       polyethylene glycol powder    MIRALAX/GLYCOLAX          prochlorperazine 5 MG tablet    COMPAZINE    30 tablet    Take 1 tablet (5 mg) by mouth every 6 hours as needed (Nausea/Vomiting)    Multiple myeloma not having achieved remission (H)       Selenium 200 MCG Tabs      Take 100 mcg by mouth daily. Pt takes one half tab of the 200 mcg daily        tamsulosin 0.4 MG capsule    FLOMAX    90 capsule    TAKE 1 CAPSULE BY MOUTH DAILY    Malignant neoplasm of prostate (H)       VITAMIN C PO      Take 1,000 mg by mouth daily        VITAMIN D3 PO      Take 1,000 Units by mouth daily        * Notice:  This list has 4 medication(s) that are the same as other medications prescribed for you. Read the directions carefully, and ask your doctor or other care provider to review them with you.

## 2017-10-04 NOTE — PATIENT INSTRUCTIONS
We will get the patient an appointment with colorectal surgery to deal with this definitively.  Flu shot was given today.  Follow-up with us will otherwise be as needed.

## 2017-10-04 NOTE — PROGRESS NOTES
SUBJECTIVE:   Roc Parkinson is a 89 year old male who presents to clinic today for the following health issues:        Rectal Discomfort      Duration: X2 weeks    Description (location/character/radiation): Rectal pain/intermittent bleeding    Intensity:  mild    Accompanying signs and symptoms: See above    History (similar episodes/previous evaluation): Previous fissure 20+ years ago    Precipitating or alleviating factors: None    Therapies tried and outcome: None             Problem list and histories reviewed & adjusted, as indicated.  Additional history: as documented    Patient Active Problem List   Diagnosis     Dysphagia     Malignant neoplasm of prostate (H)     Malignant neoplasm of bladder (H)     Essential hypertension, benign     Asymptomatic varicose veins     Congenital hiatus hernia     Oral lesion     CTS (carpal tunnel syndrome)     Irritable bowel syndrome     Vitamin D deficiency disease     Microalbuminuria     Obesity     UTI (urinary tract infection) w hx of recurrence     Iron deficiency anemia     Nonsmoker     A-fib (H)     Health Care Home     Hyperlipidemia     CKD (chronic kidney disease) stage 3, GFR 30-59 ml/min     Hypothyroidism     Long-term (current) use of anticoagulants [Z79.01]     Macrocytic anemia     GIB (gastrointestinal bleeding)     Multiple myeloma not having achieved remission (H)     Hypercalcemia     Hyperglycemia     Urinary tract infection     Hyperkalemia     ACP (advance care planning)     MDS (myelodysplastic syndrome) (H)     Chemotherapy-induced neutropenia (H)     Anemia in neoplastic disease     Type 2 diabetes mellitus with stage 3 chronic kidney disease, with long-term current use of insulin (H)     Past Surgical History:   Procedure Laterality Date     ARTHROPLASTY KNEE  11/5/2012    Procedure: ARTHROPLASTY KNEE;  RIGHT TOTAL KNEE ARTHROPLASTY (SMITH & NEPHEW)^;  Surgeon: Dickson Schulte MD;  Location: [facility] OR     BIOPSY      bladder      "COLONOSCOPY  2007     COLONOSCOPY N/A 11/15/2016    Procedure: COMBINED COLONOSCOPY, SINGLE OR MULTIPLE BIOPSY/POLYPECTOMY BY BIOPSY;  Surgeon: Kenneth Fulton MD;  Location:  GI     GENITOURINARY SURGERY      TURP x2 and bladder scraping     GI SURGERY      anal fistula     ORTHOPEDIC SURGERY      lt knee in nov.2009     SOFT TISSUE SURGERY      melanoma       Social History   Substance Use Topics     Smoking status: Never Smoker     Smokeless tobacco: Never Used     Alcohol use No     Family History   Problem Relation Age of Onset     Cardiovascular Father 81     heart arrythmia     Endocrine Disease Brother 74     Paget's             Reviewed and updated as needed this visit by clinical staffTobacco  Allergies  Meds  Med Hx  Surg Hx  Fam Hx  Soc Hx      Reviewed and updated as needed this visit by Provider         ROS:  Constitutional, HEENT, cardiovascular, pulmonary, gi and gu systems are negative, except as otherwise noted.  CONSTITUTIONAL:NEGATIVE for fever, chills, change in weight  : negative for dysuria, hematuria, decreased urinary stream, erectile dysfunction, positive for  incontinence      OBJECTIVE:                                                    /54 (BP Location: Left arm, Patient Position: Sitting, Cuff Size: Adult Regular)  Pulse 73  Temp 98.5  F (36.9  C) (Tympanic)  Ht 5' 10\" (1.778 m)  Wt 205 lb (93 kg)  SpO2 97%  BMI 29.41 kg/m2  Body mass index is 29.41 kg/(m^2).  GENERAL APPEARANCE: healthy, alert and no distress   (male): There is a 3 cm fluctuant mass on the left side of the rectum.         ASSESSMENT/PLAN:                                                        ICD-10-CM    1. Perirectal abscess K61.1    2. Need for prophylactic vaccination and inoculation against influenza Z23 FLU VACCINE, INCREASED ANTIGEN, PRESV FREE, AGE 65+ [76760]     ADMIN INFLUENZA (For MEDICARE Patients ONLY) []       Patient Instructions   We will get the patient an appointment with " colorectal surgery to deal with this definitively.  Flu shot was given today.  Follow-up with us will otherwise be as needed.      Dickson Reich MD  [facility]  Injectable Influenza Immunization Documentation    1.  Is the person to be vaccinated sick today?   No    2. Does the person to be vaccinated have an allergy to a component   of the vaccine?   No    3. Has the person to be vaccinated ever had a serious reaction   to influenza vaccine in the past?   No    4. Has the person to be vaccinated ever had Guillain-Barré syndrome?   No    Form completed by Luiza Green LPN

## 2017-10-11 NOTE — PROGRESS NOTES
Diabetes Follow-up    Subjective/Objective:    Roc Parkinson sent in blood glucose log for review. Last date of communication was: 9/26.    Diabetes is being managed with Injectable Medications: Levemir 14-0-0-0 (except on Wed and Thur with chemo then 16-0-0-0) and NovoLog Insulin 9-8-7-7     BG/Food Log:       Nutrition:  Roc doesn't understand why he is gaining weight.   He describes that the mainly eats meat and vegetables, fruit and 12 grain bread. Not eating sweets often- had pie yesterday, but doesn't normally. Does not use jelly.    Assessment:    Fasting blood glucose: 69% in target.  Before lunch glucose: 0% in target.  Before dinner glucose: 36% in target.  Bedtime glucose: 61% in target (using goal <200 with A1C goal <8% per MD, patient with multiple chronic disease).    Roc sometimes eats an evening snack of half an apple with a slice of cheese.   But, some nights, he is not having a snack and is taking 7 units of Novolog + sliding scale before bed with no food.  We reviewed that is not always safe. Encouraged nightly snack.    He describes he has occasionally woken up at ~2AM feeling low BG with result ~80. He eats a candy and BG is in goal in morning.   Based upon current dosing and BG pattern, decreased HS base dose to 5 units to eliminate overnight lows and recommended snack every night. Apple & cheese are fine.    Plan/Response:  See Patient Instructions for co-developed, patient-stated behavior change goals.  Recommend increase to insulin:  1) Novolog: with <50% pre-lunch, dinner numbers in goal, increase meal time dose to 11-10-9  2) Decrease HS Novolog dose to base of 5 units.    Continue previous sliding scale:  If blood sugar is                 Take Novolog  140-175                                1 unit extra   176-210                                2 units extra  211-245                                3 units extra    246-280                                4 units  extra   281-315                                5 units extra   316-350                                6 units extra   351-385                                7 units extra    386-420                                8 units extra  Over 420                               9 units extra    Kalyn Almanzar RD, LD, CDE      Any diabetes medication dose changes were made via the CDE Protocol and Collaborative Practice Agreement with the patient's referring provider. A copy of this encounter was shared with the provider.

## 2017-10-11 NOTE — PATIENT INSTRUCTIONS
Changes today:'    Novolog  Before breakfast take 11 units.  Before lunch take 10 units.  Before dinner take 9 units.  Bedtime take 5 units.    Continue this scale:  If blood sugar is                 Take Novolog  140-175                                1 unit extra   176-210                                2 units extra  211-245                                3 units extra    246-280                                4 units extra   281-315                                5 units extra   316-350                                6 units extra   351-385                                7 units extra    386-420                                8 units extra  Over 420                               9 units extra      Continue Levemir 14 units every morning.    Wednesday and Thursday morning Levemir 16 units.     If you have any questions, call [phone].

## 2017-10-16 NOTE — TELEPHONE ENCOUNTER
glipiZIDE (GLUCOTROL) 10 MG tablet         Last Written Prescription Date: 4/26/17  Last Fill Quantity: 180, # refills: 1  Last Office Visit with [facility], [facility] or  Health prescribing provider:  10/4/17   Next 5 appointments (look out 90 days)     Oct 18, 2017 11:00 AM CDT   Return Visit with Gretel Quinn MD   [facility] ([facility])    n [facility]  [address]  [facility] [address] [phone]            Oct 23, 2017 10:30 AM CDT   PHYSICAL with Dickson Reich MD   [facility]    [address] [place] [id number] [phone]                   BP Readings from Last 3 Encounters:   10/04/17 128/54   09/27/17 123/55   09/06/17 152/73     Lab Results   Component Value Date    MICROL 214 09/28/2016     Lab Results   Component Value Date    UMALCR 146.58 09/28/2016     Creatinine   Date Value Ref Range Status   09/27/2017 1.08 0.66 - 1.25 mg/dL Final   ]  GFR Estimate   Date Value Ref Range Status   09/27/2017 64 >60 mL/min/1.7m2 Final     Comment:     Non  GFR Calc   09/06/2017 60 (L) >60 mL/min/1.7m2 Final     Comment:     Non  GFR Calc   08/22/2017 67 >60 mL/min/1.7m2 Final     Comment:     Non  GFR Calc     GFR Estimate If Black   Date Value Ref Range Status   09/27/2017 78 >60 mL/min/1.7m2 Final     Comment:      GFR Calc   09/06/2017 73 >60 mL/min/1.7m2 Final     Comment:      GFR Calc   08/22/2017 81 >60 mL/min/1.7m2 Final     Comment:      GFR Calc     Lab Results   Component Value Date    CHOL 123 05/23/2017     Lab Results   Component Value Date    HDL 35 05/23/2017     Lab Results   Component Value Date    LDL 64 05/23/2017     Lab Results   Component Value Date    TRIG 120 05/23/2017     Lab Results   Component Value Date    CHOLHDLRATIO 4.7  10/14/2015     Lab Results   Component Value Date    AST 10 09/27/2017     Lab Results   Component Value Date    ALT 16 09/27/2017     Lab Results   Component Value Date    A1C 8.0 05/23/2017    A1C 8.0 11/14/2016    A1C 8.8 09/28/2016    A1C 8.2 05/09/2016    A1C 7.2 10/14/2015     Potassium   Date Value Ref Range Status   09/27/2017 4.7 3.4 - 5.3 mmol/L Final

## 2017-10-17 NOTE — TELEPHONE ENCOUNTER
LISINOPRIL 30MG TABLETS    Last Written Prescription Date: 10/17/2016  Last Fill Quantity: 90, # refills: 3  Last Office Visit with G, P or [facility] prescribing provider: 10/04/2017  Next 5 appointments (look out 90 days)     Oct 18, 2017 11:00 AM CDT   Return Visit with Gretel Quinn MD   [facility] ([facility])    n [facility]  [address]  [facility] [address]   [phone]            Oct 23, 2017 10:30 AM CDT   PHYSICAL with Dickson Reich MD   [facility] ([facility])    [address] [place] [id number] [phone]                   Potassium   Date Value Ref Range Status   09/27/2017 4.7 3.4 - 5.3 mmol/L Final     Creatinine   Date Value Ref Range Status   09/27/2017 1.08 0.66 - 1.25 mg/dL Final     BP Readings from Last 3 Encounters:   10/04/17 128/54   09/27/17 123/55   09/06/17 152/73

## 2017-10-18 NOTE — MR AVS SNAPSHOT
After Visit Summary   10/18/2017    Roc Parkinson    MRN: [id number]           Patient Information     Date Of Birth          7/7/1926        Visit Information        Provider Department      10/18/2017 11:00 AM Gretel Quinn MD [facility]        Today's Diagnoses     Multiple myeloma not having achieved remission (H)    -  1      Care Instructions    1.  Continue cyclophosphamide, dexamethasone   2.  Zometa every 2 months  3. Continue Aranesp 300 mcg SQ every three weeks  4- RTC MD 2 months  5- Labs today SPEP, IFXN, light chains          Follow-ups after your visit        Your next 10 appointments already scheduled     Oct 23, 2017 10:30 AM CDT   PHYSICAL with Dickson Reich MD   [facility] ([facility])    [address] [place] [address] [address]  [facility] [address]   [phone]            Oct 25, 2017   Procedure with Shady Haider MD   [facility] (--)    [address]  [facility] [address]   [phone]            Nov 01, 2017   Procedure with Shady Haider MD   [facility] (--)    [address]  [facility] [address]   [phone]            Nov 07, 2017  9:30 AM CST   Diabetic Education with  DIABETIC ED RESOURCE   [facility] ([facility])    [address] [id number] [phone]            Nov 08, 2017 11:30 AM CST   Level 1 with SH INFUSION CHAIR 17   [facility] ([facility])    [facility]  [address]  [facility] [address] [phone]            Nov 29, 2017 11:30 AM CST   Level 1 with SH INFUSION CHAIR 13   [facility] ([facility])    [facility]  [address]  [facility] [address]  "  [phone]            Dec 20, 2017 11:00 AM CST   Level 1 with  INFUSION CHAIR 5   [facility] ([facility] [place])    [facility] [place]  [address]  [place] [address]   [phone]            Dec 20, 2017 11:30 AM CST   Return Visit with Gretel Quinn MD   [facility] ([facility] [place])    [facility] [place] [address]  [place] [address] [phone]              Who to contact     If you have questions or need follow up information about today's clinic visit or your schedule please contact [facility] directly at [phone].  Normal or non-critical lab and imaging results will be communicated to you by Telematikhart, letter or phone within 4 business days after the clinic has received the results. If you do not hear from us within 7 days, please contact the clinic through Telematikhart or phone. If you have a critical or abnormal lab result, we will notify you by phone as soon as possible.  Submit refill requests through [employer] or call your pharmacy and they will forward the refill request to us. Please allow 3 business days for your refill to be completed.          Additional Information About Your Visit        Telematikhart Information     [employer] lets you send messages to your doctor, view your test results, renew your prescriptions, schedule appointments and more. To sign up, go to [email or web address] . Click on \"Log in\" on the left side of the screen, which will take you to the Welcome page. Then click on \"Sign up Now\" on the right side of the page.     You will be asked to enter the access code listed below, as well as some personal information. Please follow the directions to create your username and password.     Your access code is: [email or web address]  Expires: 2017 10:12 AM     Your access code will  in 90 days. If you need help or a new code, please call your [place] " [facility] or [phone].        Care EveryWhere ID     This is your Care EveryWhere ID. This could be used by other organizations to access your [facility] medical records  [id number]        Your Vitals Were     Pulse Temperature Respirations Pulse Oximetry BMI (Body Mass Index)       62 98.5  F (36.9  C) (Oral) 16 96% 29.99 kg/m2        Blood Pressure from Last 3 Encounters:   10/18/17 147/71   10/18/17 139/64   10/04/17 128/54    Weight from Last 3 Encounters:   10/18/17 94.8 kg (209 lb)   10/04/17 93 kg (205 lb)   09/06/17 93.3 kg (205 lb 9.6 oz)              We Performed the Following     Kappa and lambda light chain     Protein electrophoresis     Protein Immunofixation Serum        Primary Care Provider Office Phone # Fax #    Dickson Clark Reich [phone] [address] [facility] [address]        Equal Access to Services     ELIE GAGNON : Hadii aad ku hadasho Soomaali, waaxda luqadaha, qaybta kaalmada adeegyada, waxay idiin hayaan adeeg kharacarrillo la'marge . So [facility] [phone].    ATENCIÓN: Si habla español, tiene a nolan disposición servicios gratuitos de asistencia lingüística. Llame al [phone].    We comply with applicable federal civil rights laws and [place] laws. We do not discriminate on the basis of race, color, national origin, age, disability, sex, sexual orientation, or gender identity.            Thank you!     Thank you for choosing [facility]  for your care. Our goal is always to provide you with excellent care. Hearing back from our patients is one way we can continue to improve our services. Please take a few minutes to complete the written survey that you may receive in the mail after your visit with us. Thank you!             Your Updated Medication List - Protect others around you: Learn how to safely use, store and throw away your medicines at www.disposemymeds.org.          This list is accurate as of: 10/18/17 12:29 PM.  Always use your  most recent med list.                   Brand Name Dispense Instructions for use Diagnosis    acyclovir 400 MG tablet    ZOVIRAX    60 tablet    Take 1 tablet (400 mg) by mouth 2 times daily Viral Prophylaxis.    Multiple myeloma not having achieved remission (H)       amLODIPine 5 MG tablet    NORVASC    30 tablet    TAKE 1 TABLET BY MOUTH DAILY    Essential hypertension, benign       * B-D U/F 31G X 8 MM   Generic drug:  insulin pen needle           * B-D U/F 31G X 8 MM   Generic drug:  insulin pen needle     500 each    USE 5 PEN NEEDLES PER DAY OR AS DIRECTED    Type 2 diabetes mellitus with stage 3 chronic kidney disease, with long-term current use of insulin (H)       * cyclophosphamide 50 MG capsule CHEMO    CYTOXAN    24 capsule    Take 6 capsules (300 mg) by mouth once a week    Multiple myeloma not having achieved remission (H)       * cyclophosphamide 50 MG capsule CHEMO    CYTOXAN    24 capsule    Take 6 capsules (300 mg) by mouth once a week    Multiple myeloma not having achieved remission (H)       * cyclophosphamide 50 MG capsule CHEMO    CYTOXAN    24 capsule    Take 6 capsules (300 mg) by mouth once a week    Multiple myeloma not having achieved remission (H)       * dexamethasone 4 MG tablet    DECADRON    40 tablet    Take 10 tablets (40 mg) by mouth every 7 days for 4 doses Take daily on Days 1, 8, 15, and 22.    Multiple myeloma not having achieved remission (H)       * dexamethasone 4 MG tablet    DECADRON    40 tablet    Take 10 tablets (40 mg) by mouth every 7 days for 4 doses Take daily on Days 1, 8, 15, and 22.    Multiple myeloma not having achieved remission (H)       ferrous sulfate 325 (65 FE) MG tablet    IRON     Take 325 mg by mouth daily (with breakfast)        FREESTYLE LITE test strip   Generic drug:  blood glucose monitoring     200 strip    USE 1 STRIP TO TEST TWICE DAILY.    Uncontrolled type 1 diabetes mellitus with stage 3 chronic kidney disease (H)       furosemide 20 MG  tablet    LASIX    30 tablet    Take 1 tablet (20 mg) by mouth daily    Multiple myeloma not having achieved remission (H)       gemfibrozil 600 MG tablet    LOPID    180 tablet    TAKE 1 TABLET BY MOUTH TWICE DAILY    Hyperlipidemia       glipiZIDE 10 MG tablet    GLUCOTROL    180 tablet    TAKE 1 TABLET BY MOUTH TWICE DAILY BEFORE A MEAL    Type 2 diabetes mellitus with stage 3 chronic kidney disease, with long-term current use of insulin (H)       insulin aspart 100 UNIT/ML injection    NovoLOG PEN    12 mL    Inject 11 units before breakfast, 10 units before lunch, 9 units before dinner, and 5 units at bedtime. Before meal correction: 140-175 add 1 unit  176-210 add 2 units  211-245 add 3 units  246-280 add 4 units  281-315 add 5 units 316-350 add 6 units  351-385 add 7 units 386-420 add 8 units  Over 420 add 9 units    Uncontrolled type 2 diabetes mellitus with hyperglycemia, unspecified long term insulin use status (H)       insulin detemir 100 UNIT/ML injection    LEVEMIR FLEXPEN/FLEXTOUCH    15 mL    Inject 14 Units Subcutaneous every morning (before breakfast) On Wednesday and Thursday morning, inject 16 units.    Type 2 diabetes mellitus with diabetic chronic kidney disease, unspecified CKD stage, unspecified long term insulin use status (H)       levothyroxine 25 MCG tablet    SYNTHROID/LEVOTHROID    90 tablet    TAKE 1 TABLET BY MOUTH EVERY DAY.    Acquired hypothyroidism       * lisinopril 30 MG tablet    PRINIVIL,ZESTRIL    90 tablet    TAKE 1 TABLET BY MOUTH EVERY DAY    Essential hypertension, benign       * lisinopril 30 MG tablet    PRINIVIL,ZESTRIL    90 tablet    TAKE 1 TABLET BY MOUTH EVERY DAY    Essential hypertension, benign       metoprolol 25 MG tablet    LOPRESSOR    180 tablet    TAKE 1 TABLET BY MOUTH TWICE DAILY    Essential hypertension       omeprazole 20 MG CR capsule    priLOSEC    90 capsule    TAKE 1 CAPSULE BY MOUTH EVERY DAY    Congenital hiatus hernia       ondansetron 8 MG  tablet    ZOFRAN    10 tablet    Take 1 tablet (8 mg) by mouth every 8 hours as needed (Nausea/Vomiting)    Multiple myeloma not having achieved remission (H)       polyethylene glycol powder    MIRALAX/GLYCOLAX          prochlorperazine 5 MG tablet    COMPAZINE    30 tablet    Take 1 tablet (5 mg) by mouth every 6 hours as needed (Nausea/Vomiting)    Multiple myeloma not having achieved remission (H)       Selenium 200 MCG Tabs      Take 100 mcg by mouth daily. Pt takes one half tab of the 200 mcg daily        tamsulosin 0.4 MG capsule    FLOMAX    90 capsule    TAKE 1 CAPSULE BY MOUTH DAILY    Malignant neoplasm of prostate (H)       VITAMIN C PO      Take 1,000 mg by mouth daily        VITAMIN D3 PO      Take 1,000 Units by mouth daily        * Notice:  This list has 9 medication(s) that are the same as other medications prescribed for you. Read the directions carefully, and ask your doctor or other care provider to review them with you.

## 2017-10-18 NOTE — PROGRESS NOTES
"Oncology Rooming Note    October 18, 2017 11:22 AM   Roc Parkinson is a 89 year old male who presents for:    Chief Complaint   Patient presents with     Oncology Clinic Visit     follow up- multiple  myeloma      Initial Vitals: /71 (BP Location: Left arm)  Pulse 62  Temp 98.5  F (36.9  C) (Oral)  Resp 16  SpO2 96% Estimated body mass index is 29.41 kg/(m^2) as calculated from the following:    Height as of 10/4/17: 1.778 m (5' 10\").    Weight as of 10/4/17: 93 kg (205 lb). There is no height or weight on file to calculate BSA.  Data Unavailable Comment: Data Unavailable   No LMP for male patient.  Allergies reviewed: Yes  Medications reviewed: Yes    Medications: Medication refills not needed today.  Pharmacy name entered into [employer]:    [id number] [address] AT [address] [facility] [address]  [facility] [place], [place] - [address]    Clinical concerns: none Kai was notified.    10 minutes for nursing intake (face to face time)     Yolanda Marsh MA          1.  Continue cyclophosphamide, dexamethasone   2.  Zometa every 2 months Scheduled/janice  3. Continue Aranesp 300 mcg SQ every three weeks Scheduled/Janice  4- RTC MD 2 months  Scheduled/janice  5- Labs today SPEP, IFXN, light chains    AVS printed & given to patient/janice      DISCHARGE PLAN:see above  Next appointments: See patient instruction section  Departure Mode: Ambulatory  Accompanied by: unknown self discharge  0 minutes for nursing discharge (face to face time)   Meghan Melgar RN      "

## 2017-10-18 NOTE — PROGRESS NOTES
[facility] PHYSICIANS  HEMATOLOGY ONCOLOGY     DIAGNOSIS:    1- Kappa light chain IgM multiple myeloma in a 89-year-old patient.  Bone marrow biopsy on 11/17/2016 showed hypercellular bone marrow with 65% cellularity of light chain restricted plasma cells.  FISH or G-banding could not be performed due to insufficient sample.   There is a background of myelodysplastic syndrome.  The patient was initially seen in the hospital on 11/17/2016 for macrocytic anemia and pancytopenia and transfusion requirement and a bone marrow biopsy was performed.   2- History of prostate cancer s/p EBRT s/p brachytherapy in 2003 (recent PSA 0.10), superficial bladder cancer ,no recurrences since 1986     TREATMENT: 12/15/16 velcade/dex. 4/12/17 added cyclophosphamide 300 mg weekly.6/28/17 we discontinued velcade due to concern for neuropathy and continued with weekly cyclophosphamide and dexamethasone.      SUBJECTIVE:  The patient was seen as a followup today. He has been feeling well. Tolerating treatment well without any significant complications.     REVIEW OF SYSTEMS:  A complete review of systems was performed and found to be negative other than pertinent positives mentioned in history of present illness.     Past medical, social histories reviewed.    Meds- Reviewed.     PHYSICAL EXAMINATION:   VITAL SIGNS:/71 (BP Location: Left arm)  Pulse 62  Temp 98.5  F (36.9  C) (Oral)  Resp 16  SpO2 96%  CONSTITUTIONAL: Appears tired.   HEENT: Pupils are equal. Oropharynx is clear.   NECK: No cervical or supraclavicular lymphadenopathy.   RESPIRATORY: Clear bilaterally.   CARD/VASC: S1, S2, regular.   GI: Soft, nontender, nondistended, no hepatosplenomegaly.   MUSKULOSKELETAL: Warm, well perfused.   NEUROLOGIC: Alert, awake.   INTEGUMENT: No rash.   LYMPHATICS: Trace edema.   PSYCH: Mood and affect was normal.     LABORATORY DATA AND IMAGING REVIEWED DURING THIS VISIT:  Recent Labs   Lab Test  09/27/17   0931   09/06/17   1300   NA  138  139   POTASSIUM  4.7  5.0   CHLORIDE  108  108   CO2  20  22   ANIONGAP  10  9   BUN  35*  36*   CR  1.08  1.14   GLC  256*  237*   MICHELLE  9.2  9.1     Recent Labs   Lab Test  10/18/17   1050  09/27/17   0955  09/06/17   1300   WBC  3.9*  3.8*  4.3   HGB  9.8*  9.4*  9.1*   PLT  142*  142*  132*   MCV  111*  113*  111*   NEUTROPHIL  89.6  87.6  88.6     Recent Labs   Lab Test  09/27/17   0955  09/06/17   1300  08/22/17   1520   11/17/16   0938   BILITOTAL  0.5  0.4  0.4   < >   --    ALKPHOS  66  66  62   < >   --    ALT  16  16  17   < >   --    AST  10  12  10   < >   --    ALBUMIN  3.0*  3.2*  3.2*   < >   --    LDH   --    --    --    --   110    < > = values in this interval not displayed.   Results for LACIE JACOB G (MRN [id number]) as of 10/18/2017 11:29   Ref. Range 6/7/2017 10:25 7/26/2017 11:31 8/22/2017 15:20 9/27/2017 09:55   Gamma Fraction Latest Ref Range: 0.7 - 1.6 g/dL 2.8 (H) 1.9 (H) Canceled, Test cr... 1.6   IGA Latest Ref Range: 70 - 380 mg/dL 18 (L) 20 (L) Canceled, Test cr... 20 (L)   IGG Latest Ref Range: 695 - 1620 mg/dL 352 (L) 341 (L) Canceled, Test cr... 323 (L)   IGM Latest Ref Range: 60 - 265 mg/dL 3900 (H) 2460 (H) Canceled, Test cr... 2230 (H)   Immunofixation ELP Unknown (Note)... (Note)... Canceled, Test cr... (Note)   Kappa Free Lt Chain Latest Ref Range: 0.33 - 1.94 mg/dL 91.25 (H) 48.25 (H) Canceled, Test cr... 55.00 (H)   Kappa Lambda Ratio Latest Ref Range: 0.26 - 1.65  138.26 (H) 51.88 (H) Canceled, Test cr... 54.46 (H)   Lambda Free Lt Chain Latest Ref Range: 0.57 - 2.63 mg/dL 0.66 0.93 Canceled, Test cr... 1.01   Monoclonal Peak Latest Ref Range: 0.0 g/dL 2.5 (H) 1.6 (H) Canceled, Test cr... 1.3 (H)     ECOG PS: 1    ASSESSMENT:  This is a 89-year-old gentleman who has kappa light chain multiple myeloma with hypercellular marrow with 65% of cells are light chain restricted plasma cells.  He had severe pancytopenia and has needed a transfusion in  the hospital.  He did not have hypercalcemia and his renal function was normal. He has a background of myelodysplastic syndrome on his bone marrow biopsy; however, majority of the cell population was monoclonal plasma cell population.   He was started on treatment due to cytopenia.   In 4/2017 his light chain levels were getting worse. M spike was stable. We added cyclophosphamide to the regimen. In 6/2017 discontinued velcade for concern of development of neuropathy, in hindsight the pain appeared to be related to arthritis.     - Labs were reviewed with the patient, normocytic anemia related to disease. Thrombocytopenia at 142K. Mild leukopenia.   M spike elevated at 1.3, better, stable light chains.   - He is on Aranesp 300 mcg SQ every three weeks.    PLAN:   1.  Continue cyclophosphamide, dexamethasone   2.  Zometa every 2 months  3. Continue Aranesp 300 mcg SQ every three weeks  4- RTC MD 2 months  5- Labs today SPEP, IFXN, light chains    ALISON JON MD    10/18/2017

## 2017-10-18 NOTE — PROGRESS NOTES
Infusion Nursing Note:  Roc Parkinson presents today for Labs/Zometa/Possible Aranesp.    Patient seen by provider today: Yes: Dr. Quinn   present during visit today: Not Applicable.    Note: N/A.    Intravenous Access:  Labs drawn without difficulty.  Peripheral IV placed.    Treatment Conditions:  Lab Results   Component Value Date    HGB 9.8 10/18/2017     Lab Results   Component Value Date    WBC 3.9 10/18/2017      Lab Results   Component Value Date    ANEU 3.5 10/18/2017     Lab Results   Component Value Date     10/18/2017      Lab Results   Component Value Date     10/18/2017                   Lab Results   Component Value Date    POTASSIUM 4.9 10/18/2017           No results found for: MAG         Lab Results   Component Value Date    CR 1.09 10/18/2017                   Lab Results   Component Value Date    MICHELLE 9.3 10/18/2017                Lab Results   Component Value Date    BILITOTAL 0.5 10/18/2017           Lab Results   Component Value Date    ALBUMIN 3.1 10/18/2017                    Lab Results   Component Value Date    ALT 17 10/18/2017           Lab Results   Component Value Date    AST 17 10/18/2017     Results reviewed, labs MET treatment parameters, ok to proceed with treatment.    Patient will receive aranesp today for hgb 9.8.        Post Infusion Assessment:  Patient tolerated infusion without incident.  Patient tolerated injection without incident.  Blood return noted pre and post infusion.  Site patent and intact, free from redness, edema or discomfort.  No evidence of extravasations.  Access discontinued per protocol.    Discharge Plan:   Discharge instructions reviewed with: Patient.  Patient and/or family verbalized understanding of discharge instructions and all questions answered.  Copy of AVS reviewed with patient and/or family.  Patient will return 11/8/17 for next appointment.  Patient discharged in stable condition accompanied by: self.  Departure Mode:  Ambulatory.    Alberto Nash RN

## 2017-10-18 NOTE — PATIENT INSTRUCTIONS
1.  Continue cyclophosphamide, dexamethasone   2.  Zometa every 2 months Scheduled/janice  3. Continue Aranesp 300 mcg SQ every three weeks Scheduled/Janice  4- RTC MD 2 months  Scheduled/janice  5- Labs today SPEP, IFXN, light chains    AVS printed & given to patient/janice

## 2017-10-18 NOTE — MR AVS SNAPSHOT
After Visit Summary   10/18/2017    Roc Parkinson    MRN: [id number]           Patient Information     Date Of Birth          7/7/1926        Visit Information        Provider Department      10/18/2017 10:30 AM  INFUSION CHAIR 18 [facility]        Today's Diagnoses     Multiple myeloma not having achieved remission (H)    -  1    Macrocytic anemia        Hypercalcemia        MDS (myelodysplastic syndrome) (H)           Follow-ups after your visit        Your next 10 appointments already scheduled     Oct 23, 2017 10:30 AM CDT   PHYSICAL with Dickson Reich MD   [facility] ([facility]    [address]  [address]  [place] [address]   [phone]            Oct 25, 2017   Procedure with Shady Haider MD   [facility] (--)    [address]  [facility] [address]   [phone]            Nov 01, 2017   Procedure with Shady Haider MD   [facility] (--)    [address]  [facility] [address]   [phone]            Nov 07, 2017  9:30 AM CST   Diabetic Education with  DIABETIC ED RESOURCE   [facility] ([facility])    [address] [place] [address]   [phone]            Nov 08, 2017 11:30 AM CST   Level 1 with  INFUSION CHAIR 17   [facility] ([facility])    [facility]  [address]  [facility] [address]   [phone]            Nov 29, 2017 11:30 AM CST   Level 1 with SH INFUSION CHAIR 13   [facility] ([facility])    [facility]  [address]  [facility] [address]   [phone]            Dec 20, 2017 11:00 AM CST   Level 1 with  INFUSION CHAIR 5   [facility]  "Clinic and Infusion Center ([facility])    [facility]  [address]  [place] [id number] [phone]            Dec 20, 2017 11:30 AM CST   Return Visit with Gretel Quinn MD   [facility] ([facility])    [facility]  [address]  [place] [id number] [phone]              Who to contact     If you have questions or need follow up information about today's clinic visit or your schedule please contact [facility] directly at [phone].  Normal or non-critical lab and imaging results will be communicated to you by [employer], letter or phone within 4 business days after the clinic has received the results. If you do not hear from us within 7 days, please contact the clinic through [employer] or phone. If you have a critical or abnormal lab result, we will notify you by phone as soon as possible.  Submit refill requests through [employer] or call your pharmacy and they will forward the refill request to us. Please allow 3 business days for your refill to be completed.          Additional Information About Your Visit        MyChart Information     [employer] lets you send messages to your doctor, view your test results, renew your prescriptions, schedule appointments and more. To sign up, go to [email or web address] . Click on \"Log in\" on the left side of the screen, which will take you to the Welcome page. Then click on \"Sign up Now\" on the right side of the page.     You will be asked to enter the access code listed below, as well as some personal information. Please follow the directions to create your username and password.     Your access code is: [email or web address]  Expires: 2017 10:12 AM     Your access code will  in 90 days. If you need help or a new code, please call your [facility] clinic or [phone].        Care EveryWhere ID     This is your Care EveryWhere " ID. This could be used by other organizations to access your [facility] medical records  [id number]        Your Vitals Were     Pulse Temperature Respirations Pulse Oximetry          63 98.5  F (36.9  C) (Oral) 16 96%         Blood Pressure from Last 3 Encounters:   10/18/17 147/71   10/18/17 143/65   10/04/17 128/54    Weight from Last 3 Encounters:   10/18/17 94.8 kg (209 lb)   10/04/17 93 kg (205 lb)   09/06/17 93.3 kg (205 lb 9.6 oz)              We Performed the Following     CBC with platelets differential     Comprehensive metabolic panel        Primary Care Provider Office Phone # Fax #    Dickson Reich [phone] [address] [facility] [address]        Equal Access to Services     ELIE GAGNON : Hadii [place] ku hadasho Soomaali, waaxda luqadaha, qaybta kaalmada adeegyada, melita shaw . So [facility] [phone].    ATENCIÓN: Si habla español, tiene a nolan disposición servicios gratuitos de asistencia lingüística. [facility] [phone].    We comply with applicable federal civil rights laws and [place] laws. We do not discriminate on the basis of race, color, national origin, age, disability, sex, sexual orientation, or gender identity.            Thank you!     Thank you for choosing [facility]  for your care. Our goal is always to provide you with excellent care. Hearing back from our patients is one way we can continue to improve our services. Please take a few minutes to complete the written survey that you may receive in the mail after your visit with us. Thank you!             Your Updated Medication List - Protect others around you: Learn how to safely use, store and throw away your medicines at www.disposemymeds.org.          This list is accurate as of: 10/18/17 12:46 PM.  Always use your most recent med list.                   Brand Name Dispense Instructions for use Diagnosis    acyclovir 400 MG tablet     ZOVIRAX    60 tablet    Take 1 tablet (400 mg) by mouth 2 times daily Viral Prophylaxis.    Multiple myeloma not having achieved remission (H)       amLODIPine 5 MG tablet    NORVASC    30 tablet    TAKE 1 TABLET BY MOUTH DAILY    Essential hypertension, benign       * B-D U/F 31G X 8 MM   Generic drug:  insulin pen needle           * B-D U/F 31G X 8 MM   Generic drug:  insulin pen needle     500 each    USE 5 PEN NEEDLES PER DAY OR AS DIRECTED    Type 2 diabetes mellitus with stage 3 chronic kidney disease, with long-term current use of insulin (H)       * cyclophosphamide 50 MG capsule CHEMO    CYTOXAN    24 capsule    Take 6 capsules (300 mg) by mouth once a week    Multiple myeloma not having achieved remission (H)       * cyclophosphamide 50 MG capsule CHEMO    CYTOXAN    24 capsule    Take 6 capsules (300 mg) by mouth once a week    Multiple myeloma not having achieved remission (H)       * cyclophosphamide 50 MG capsule CHEMO    CYTOXAN    24 capsule    Take 6 capsules (300 mg) by mouth once a week    Multiple myeloma not having achieved remission (H)       * dexamethasone 4 MG tablet    DECADRON    40 tablet    Take 10 tablets (40 mg) by mouth every 7 days for 4 doses Take daily on Days 1, 8, 15, and 22.    Multiple myeloma not having achieved remission (H)       * dexamethasone 4 MG tablet    DECADRON    40 tablet    Take 10 tablets (40 mg) by mouth every 7 days for 4 doses Take daily on Days 1, 8, 15, and 22.    Multiple myeloma not having achieved remission (H)       ferrous sulfate 325 (65 FE) MG tablet    IRON     Take 325 mg by mouth daily (with breakfast)        FREESTYLE LITE test strip   Generic drug:  blood glucose monitoring     200 strip    USE 1 STRIP TO TEST TWICE DAILY.    Uncontrolled type 1 diabetes mellitus with stage 3 chronic kidney disease (H)       furosemide 20 MG tablet    LASIX    30 tablet    Take 1 tablet (20 mg) by mouth daily    Multiple myeloma not having achieved remission (H)        gemfibrozil 600 MG tablet    LOPID    180 tablet    TAKE 1 TABLET BY MOUTH TWICE DAILY    Hyperlipidemia       glipiZIDE 10 MG tablet    GLUCOTROL    180 tablet    TAKE 1 TABLET BY MOUTH TWICE DAILY BEFORE A MEAL    Type 2 diabetes mellitus with stage 3 chronic kidney disease, with long-term current use of insulin (H)       insulin aspart 100 UNIT/ML injection    NovoLOG PEN    12 mL    Inject 11 units before breakfast, 10 units before lunch, 9 units before dinner, and 5 units at bedtime. Before meal correction: 140-175 add 1 unit  176-210 add 2 units  211-245 add 3 units  246-280 add 4 units  281-315 add 5 units 316-350 add 6 units  351-385 add 7 units 386-420 add 8 units  Over 420 add 9 units    Uncontrolled type 2 diabetes mellitus with hyperglycemia, unspecified long term insulin use status (H)       insulin detemir 100 UNIT/ML injection    LEVEMIR FLEXPEN/FLEXTOUCH    15 mL    Inject 14 Units Subcutaneous every morning (before breakfast) On Wednesday and Thursday morning, inject 16 units.    Type 2 diabetes mellitus with diabetic chronic kidney disease, unspecified CKD stage, unspecified long term insulin use status (H)       levothyroxine 25 MCG tablet    SYNTHROID/LEVOTHROID    90 tablet    TAKE 1 TABLET BY MOUTH EVERY DAY.    Acquired hypothyroidism       * lisinopril 30 MG tablet    PRINIVIL,ZESTRIL    90 tablet    TAKE 1 TABLET BY MOUTH EVERY DAY    Essential hypertension, benign       * lisinopril 30 MG tablet    PRINIVIL,ZESTRIL    90 tablet    TAKE 1 TABLET BY MOUTH EVERY DAY    Essential hypertension, benign       metoprolol 25 MG tablet    LOPRESSOR    180 tablet    TAKE 1 TABLET BY MOUTH TWICE DAILY    Essential hypertension       omeprazole 20 MG CR capsule    priLOSEC    90 capsule    TAKE 1 CAPSULE BY MOUTH EVERY DAY    Congenital hiatus hernia       ondansetron 8 MG tablet    ZOFRAN    10 tablet    Take 1 tablet (8 mg) by mouth every 8 hours as needed (Nausea/Vomiting)    Multiple myeloma  not having achieved remission (H)       polyethylene glycol powder    MIRALAX/GLYCOLAX          prochlorperazine 5 MG tablet    COMPAZINE    30 tablet    Take 1 tablet (5 mg) by mouth every 6 hours as needed (Nausea/Vomiting)    Multiple myeloma not having achieved remission (H)       Selenium 200 MCG Tabs      Take 100 mcg by mouth daily. Pt takes one half tab of the 200 mcg daily        tamsulosin 0.4 MG capsule    FLOMAX    90 capsule    TAKE 1 CAPSULE BY MOUTH DAILY    Malignant neoplasm of prostate (H)       VITAMIN C PO      Take 1,000 mg by mouth daily        VITAMIN D3 PO      Take 1,000 Units by mouth daily        * Notice:  This list has 9 medication(s) that are the same as other medications prescribed for you. Read the directions carefully, and ask your doctor or other care provider to review them with you.

## 2017-10-18 NOTE — PROGRESS NOTES
Oral Chemotherapy Monitoring Program    Primary Oncologist: Dr. Quinn  Primary Oncology Clinic: [facility]  Cancer Diagnosis: Multiple Myeloma    Therapy History:  Started 4/20/17    Drug Interaction Assessment: none    Lab Monitoring Plan  C1D1+   CMP, CBC C2D1+ Call, CMP, CBC C3D1+ Call, CMP, CBC C4D1+ Call, CMP, CBC C5D1+ Call, CMP, CBC C6D1+ Call, CMP, CBC   C1D8+   C2D8+   C3D8+   C4D8+   C5D8+   C6D8+     C1D15+ Call C2D15+   C3D15+   C4D15+   C5D15+   C6D15+     C1D22+   C2D22+   C3D22+   C4D22+   C5D22+   C6D22+     CBC monthly  CMP monthly        Subjective/Objective:  Roc Parkinson is a 89 year old male seen in clinic for a follow-up visit for oral chemotherapy.  Met with Roc in clinic today. He is doing very well. He states he is still taking 300mg of cytoxan (6caps) weekly on Wednesdays and also 40mg (10 tabs) of dexamethasone on Wednesdays as well. He denies all side effects at this time. He only mentions that he has had an upset stomach a couple of times but not anything concerning.    ORAL CHEMOTHERAPY 4/12/2017 5/10/2017 6/14/2017 8/16/2017 9/7/2017 10/18/2017   Drug Name Cytoxan (Cyclophsphamide) Cytoxan (Cyclophsphamide) Cytoxan (Cyclophsphamide) Cytoxan (Cyclophsphamide) Cytoxan (Cyclophsphamide) Cytoxan (Cyclophsphamide)   Current Dosage 300mg 300mg 300mg 300mg 300mg 300mg   Current Schedule Weekly Weekly Weekly Weekly Weekly Weekly   Cycle Details Continuous Continuous Continuous Continuous Continuous Continuous   Start Date of Last Cycle - 4/20/2017 6/5/2017 - - -   Planned next cycle start date 4/19/2017 - 7/3/2017 - - 9/27/2017   Doses missed in last 2 weeks - 0 0 0 0 0   Adherence Assessment - Adherent Adherent Adherent Adherent Adherent   Adverse Effects - No AE identified during assessment No AE identified during assessment No AE identified during assessment No AE identified during assessment No AE identified during assessment   Home BPs - - - all BPs<140/90 - -   Any new drug  "interactions? No No - No No -   Is the dose as ordered appropriate for the patient? Yes Yes - Yes Yes -   Is the patient currently in pain? - Assessed in last 30 days. - No Assessed in last 30 days. -   Has the patient been assessed within the past 6 months for depression? - - - Yes Yes -   Has the patient missed any days of school, work, or other routine activity? - - - No No -       Last PHQ-2 Score on record:   PHQ-2 ( 1999 [employer]) 10/4/2017 8/15/2017   Q1: Little interest or pleasure in doing things 0 0   Q2: Feeling down, depressed or hopeless 0 0   PHQ-2 Score 0 0       Patient does not report depression symptoms.      Vitals:  BP:   BP Readings from Last 1 Encounters:   10/18/17 147/71     Wt Readings from Last 1 Encounters:   10/18/17 94.8 kg (209 lb)     Estimated body surface area is 2.16 meters squared as calculated from the following:    Height as of 10/4/17: 1.778 m (5' 10\").    Weight as of an earlier encounter on 10/18/17: 94.8 kg (209 lb).    Labs:  Lab Results   Component Value Date     10/18/2017      Lab Results   Component Value Date    POTASSIUM 4.9 10/18/2017     Lab Results   Component Value Date    MICHELLE 9.3 10/18/2017     Lab Results   Component Value Date    ALBUMIN 3.1 10/18/2017     No results found for: MAG  No results found for: PHOS  Lab Results   Component Value Date    BUN 34 10/18/2017     Lab Results   Component Value Date    CR 1.09 10/18/2017       Lab Results   Component Value Date    AST 17 10/18/2017     Lab Results   Component Value Date    ALT 17 10/18/2017     Lab Results   Component Value Date    BILITOTAL 0.5 10/18/2017       Lab Results   Component Value Date    WBC 3.9 10/18/2017     Lab Results   Component Value Date    HGB 9.8 10/18/2017     Lab Results   Component Value Date     10/18/2017     Lab Results   Component Value Date    ANEU 3.5 10/18/2017       Labs are WNL    Assessment:  Patient is tolerating Cytoxan and dexamethasone well at this " time.    Plan:  Continue weekly cytoxan and dex. Labs every 3 weeks to align with aranesp/zometa appts.    Follow-Up:  11/08/17    Refill Due:  11/22/17    Maria Eugenia Cortes PharmD  October 18, 2017

## 2017-10-23 NOTE — MR AVS SNAPSHOT
After Visit Summary   10/23/2017    Roc Parkinson    MRN: [id number]           Patient Information     Date Of Birth          7/7/1926        Visit Information        Provider Department      10/23/2017 10:30 AM Dickson Reich MD [facility]        Today's Diagnoses     Preop general physical exam    -  1    Other age-related cataract of both eyes        Type 2 diabetes mellitus with stage 3 chronic kidney disease, with long-term current use of insulin (H)        CKD (chronic kidney disease) stage 3, GFR 30-59 ml/min        Essential hypertension, benign        Multiple myeloma not having achieved remission (H)          Care Instructions      Preventive Health Recommendations:   Male Ages 65 and over    Yearly exam:             See your health care provider every year in order to  o   Review health changes.   o   Discuss preventive care.    o   Review your medicines if your doctor has prescribed any.    Talk with your health care provider about whether you should have a test to screen for prostate cancer (PSA).    Every 3 years, have a diabetes test (fasting glucose). If you are at risk for diabetes, you should have this test more often.    Every 5 years, have a cholesterol test. Have this test more often if you are at risk for high cholesterol or heart disease.     Every 10 years, have a colonoscopy. Or, have a yearly FIT test (stool test). These exams will check for colon cancer.    Talk to with your health care provider about screening for Abdominal Aortic Aneurysm if you have a family history of AAA or have a history of smoking.    Shots:     Get a flu shot each year.     Get a tetanus shot every 10 years.     Talk to your doctor about your pneumonia vaccines. There are now two you should receive - Pneumovax (PPSV 23) and Prevnar (PCV 13).     Talk to your doctor about a shingles vaccine.     Talk to your doctor about the hepatitis B vaccine.  Nutrition:      Eat at least 5 servings of fruits and vegetables each day.     Eat whole-grain bread, whole-wheat pasta and brown rice instead of white grains and rice.     Talk to your provider about Calcium and Vitamin D.   Lifestyle    Exercise for at least 150 minutes a week (30 minutes a day, 5 days a week). This will help you control your weight and prevent disease.     Limit alcohol to one drink per day.     No smoking.     Wear sunscreen to prevent skin cancer.     See your dentist every six months for an exam and cleaning.     See your eye doctor every 1 to 2 years to screen for conditions such as glaucoma, macular degeneration, cataracts, etc   Before Your Surgery    Call your surgeon if there is any change in your health. This includes signs of a cold or flu (such as a sore throat, runny nose, cough, rash or fever).  Do not smoke, drink alcohol or take over the counter medicine (unless your surgeon or primary care doctor tells you to) for the 24 hours before and after surgery.  If you take prescribed drugs: Follow your doctor s orders about which medicines to take and which to stop until after surgery.  Eating and drinking prior to surgery: follow the instructions from your surgeon  Take a shower or bath the night before surgery. Use the soap your surgeon gave you to gently clean your skin. If you do not have soap from your surgeon, use your regular soap. Do not shave or scrub the surgery site.  Wear clean pajamas and have clean sheets on your bed.           Follow-ups after your visit        Your next 10 appointments already scheduled     Oct 25, 2017   Procedure with Shady Haider MD   [facility] (--)    [address]  [facility] [address]   [phone]            Nov 01, 2017   Procedure with Shady Haider MD   [facility] (--)    [address]  [facility] [address]   [phone]            Nov 07, 2017  9:30 AM CST   Diabetic  Education with  DIABETIC ED RESOURCE   [facility] ([facility]    [address] [address] [phone]            Nov 08, 2017 11:30 AM CST   Level 1 with SH INFUSION CHAIR 17   [facility] ([facility])    [facility] [place]  [address]  [place] [address]   [phone]            Nov 29, 2017 11:30 AM CST   Level 1 with SH INFUSION CHAIR 13   [facility] ([facility])    [facility] [place]  [address]  [place] [address]   [phone]            Dec 20, 2017 11:00 AM CST   Level 1 with SH INFUSION CHAIR 5   [facility] ([facility])    [facility] [place]  [address]  [place] [address]   [phone]            Dec 20, 2017 11:30 AM CST   Return Visit with Gretel Quinn MD   [facility] ([facility])    [facility]  [address]  [place] [address]   [phone]              Who to contact     If you have questions or need follow up information about today's clinic visit or your schedule please contact [facility] directly at [phone].  Normal or non-critical lab and imaging results will be communicated to you by [employer], letter or phone within 4 business days after the clinic has received the results. If you do not hear from us within 7 days, please contact the clinic through [employer] or phone. If you have a critical or abnormal lab result, we will notify you by phone as soon as possible.  Submit refill requests through [email or web address] or call your pharmacy and they will forward the refill request to us. Please allow 3 business days for your refill to be completed.          Additional Information About Your Visit        [facility]  "Information     [employer] lets you send messages to your doctor, view your test results, renew your prescriptions, schedule appointments and more. To sign up, go to [email or web address] . Click on \"Log in\" on the left side of the screen, which will take you to the Welcome page. Then click on \"Sign up Now\" on the right side of the page.     You will be asked to enter the access code listed below, as well as some personal information. Please follow the directions to create your username and password.     Your access code is: [email or web address]  Expires: 2017 10:12 AM     Your access code will  in 90 days. If you need help or a new code, please call your [facility] clinic or [phone].        Care EveryWhere ID     This is your [facility] EveryWhere ID. This could be used by other organizations to access your [facility] medical records  [id number]        Your Vitals Were     Pulse Temperature Respirations Height Pulse Oximetry BMI (Body Mass Index)    72 97.2  F (36.2  C) (Tympanic) 16 5' 10\" (1.778 m) 99% 29.56 kg/m2       Blood Pressure from Last 3 Encounters:   10/23/17 110/54   10/18/17 147/71   10/18/17 143/65    Weight from Last 3 Encounters:   10/23/17 206 lb (93.4 kg)   10/18/17 209 lb (94.8 kg)   10/04/17 205 lb (93 kg)              Today, you had the following     No orders found for display       Primary Care Provider Office Phone # Fax #    Dickson Reich [phone] [address] [facility] [address]        Equal Access to Services     [facility] AH: Hadii virginia Driver, waaxda luqadaha, qaybta kaalmada paddy, melita rodriguez. So [facility] [phone].    ATENCIÓN: Si habla español, tiene a nolan disposición servicios gratuitos de asistencia lingüística. Llame al [phone].    We comply with applicable federal civil rights laws and [place] laws. We do not discriminate on the basis of race, color, national origin, age, disability, " sex, sexual orientation, or gender identity.            Thank you!     Thank you for choosing [facility]  for your care. Our goal is always to provide you with excellent care. Hearing back from our patients is one way we can continue to improve our services. Please take a few minutes to complete the written survey that you may receive in the mail after your visit with us. Thank you!             Your Updated Medication List - Protect others around you: Learn how to safely use, store and throw away your medicines at www.disposemymeds.org.          This list is accurate as of: 10/23/17 10:54 AM.  Always use your most recent med list.                   Brand Name Dispense Instructions for use Diagnosis    acyclovir 400 MG tablet    ZOVIRAX    60 tablet    Take 1 tablet (400 mg) by mouth 2 times daily Viral Prophylaxis.    Multiple myeloma not having achieved remission (H)       amLODIPine 5 MG tablet    NORVASC    30 tablet    TAKE 1 TABLET BY MOUTH DAILY    Essential hypertension, benign       * B-D U/F 31G X 8 MM   Generic drug:  insulin pen needle           * B-D U/F 31G X 8 MM   Generic drug:  insulin pen needle     500 each    USE 5 PEN NEEDLES PER DAY OR AS DIRECTED    Type 2 diabetes mellitus with stage 3 chronic kidney disease, with long-term current use of insulin (H)       * cyclophosphamide 50 MG capsule CHEMO    CYTOXAN    24 capsule    Take 6 capsules (300 mg) by mouth once a week    Multiple myeloma not having achieved remission (H)       * cyclophosphamide 50 MG capsule CHEMO    CYTOXAN    24 capsule    Take 6 capsules (300 mg) by mouth once a week    Multiple myeloma not having achieved remission (H)       * cyclophosphamide 50 MG capsule CHEMO    CYTOXAN    24 capsule    Take 6 capsules (300 mg) by mouth once a week    Multiple myeloma not having achieved remission (H)       * dexamethasone 4 MG tablet    DECADRON    40 tablet    Take 10 tablets (40 mg) by mouth every 7  days for 4 doses Take daily on Days 1, 8, 15, and 22.    Multiple myeloma not having achieved remission (H)       * dexamethasone 4 MG tablet    DECADRON    40 tablet    Take 10 tablets (40 mg) by mouth every 7 days for 4 doses Take daily on Days 1, 8, 15, and 22.    Multiple myeloma not having achieved remission (H)       ferrous sulfate 325 (65 FE) MG tablet    IRON     Take 325 mg by mouth daily (with breakfast)        FREESTYLE LITE test strip   Generic drug:  blood glucose monitoring     200 strip    USE 1 STRIP TO TEST TWICE DAILY.    Uncontrolled type 1 diabetes mellitus with stage 3 chronic kidney disease (H)       furosemide 20 MG tablet    LASIX    30 tablet    Take 1 tablet (20 mg) by mouth daily    Multiple myeloma not having achieved remission (H)       gemfibrozil 600 MG tablet    LOPID    180 tablet    TAKE 1 TABLET BY MOUTH TWICE DAILY    Hyperlipidemia       glipiZIDE 10 MG tablet    GLUCOTROL    180 tablet    TAKE 1 TABLET BY MOUTH TWICE DAILY BEFORE A MEAL    Type 2 diabetes mellitus with stage 3 chronic kidney disease, with long-term current use of insulin (H)       insulin aspart 100 UNIT/ML injection    NovoLOG PEN    12 mL    Inject 11 units before breakfast, 10 units before lunch, 9 units before dinner, and 5 units at bedtime. Before meal correction: 140-175 add 1 unit  176-210 add 2 units  211-245 add 3 units  246-280 add 4 units  281-315 add 5 units 316-350 add 6 units  351-385 add 7 units 386-420 add 8 units  Over 420 add 9 units    Uncontrolled type 2 diabetes mellitus with hyperglycemia, unspecified long term insulin use status (H)       insulin detemir 100 UNIT/ML injection    LEVEMIR FLEXPEN/FLEXTOUCH    15 mL    Inject 14 Units Subcutaneous every morning (before breakfast) On Wednesday and Thursday morning, inject 16 units.    Type 2 diabetes mellitus with diabetic chronic kidney disease, unspecified CKD stage, unspecified long term insulin use status (H)       levothyroxine 25 MCG  tablet    SYNTHROID/LEVOTHROID    90 tablet    TAKE 1 TABLET BY MOUTH EVERY DAY.    Acquired hypothyroidism       * lisinopril 30 MG tablet    PRINIVIL,ZESTRIL    90 tablet    TAKE 1 TABLET BY MOUTH EVERY DAY    Essential hypertension, benign       * lisinopril 30 MG tablet    PRINIVIL,ZESTRIL    90 tablet    TAKE 1 TABLET BY MOUTH EVERY DAY    Essential hypertension, benign       metoprolol 25 MG tablet    LOPRESSOR    180 tablet    TAKE 1 TABLET BY MOUTH TWICE DAILY    Essential hypertension       omeprazole 20 MG CR capsule    priLOSEC    90 capsule    TAKE 1 CAPSULE BY MOUTH EVERY DAY    Congenital hiatus hernia       ondansetron 8 MG tablet    ZOFRAN    10 tablet    Take 1 tablet (8 mg) by mouth every 8 hours as needed (Nausea/Vomiting)    Multiple myeloma not having achieved remission (H)       polyethylene glycol powder    MIRALAX/GLYCOLAX          prochlorperazine 5 MG tablet    COMPAZINE    30 tablet    Take 1 tablet (5 mg) by mouth every 6 hours as needed (Nausea/Vomiting)    Multiple myeloma not having achieved remission (H)       Selenium 200 MCG Tabs      Take 100 mcg by mouth daily. Pt takes one half tab of the 200 mcg daily        tamsulosin 0.4 MG capsule    FLOMAX    90 capsule    TAKE 1 CAPSULE BY MOUTH DAILY    Malignant neoplasm of prostate (H)       VITAMIN C PO      Take 1,000 mg by mouth daily        VITAMIN D3 PO      Take 1,000 Units by mouth daily        * Notice:  This list has 9 medication(s) that are the same as other medications prescribed for you. Read the directions carefully, and ask your doctor or other care provider to review them with you.

## 2017-10-23 NOTE — NURSING NOTE
"Chief Complaint   Patient presents with     Pre-Op Exam       Initial /54  Pulse 72  Temp 97.2  F (36.2  C) (Tympanic)  Resp 16  Ht 5' 10\" (1.778 m)  Wt 206 lb (93.4 kg)  SpO2 99%  BMI 29.56 kg/m2 Estimated body mass index is 29.56 kg/(m^2) as calculated from the following:    Height as of this encounter: 5' 10\" (1.778 m).    Weight as of this encounter: 206 lb (93.4 kg).  Medication Reconciliation: complete     Mariia Allen CMA      "

## 2017-10-23 NOTE — PROGRESS NOTES
"SUBJECTIVE:         Physical   Annual:     Getting at least 3 servings of Calcium per day::  Yes    Bi-annual eye exam::  Yes    Dental care twice a year::  Yes    Sleep apnea or symptoms of sleep apnea::  None    Diet::  Regular (no restrictions)    Frequency of exercise::  None    Taking medications regularly::  Yes    Medication side effects::  None    Additional concerns today::  No                      Review of Systems      OBJECTIVE:   /54  Pulse 72  Temp 97.2  F (36.2  C) (Tympanic)  Resp 16  Ht 5' 10\" (1.778 m)  Wt 206 lb (93.4 kg)  SpO2 99%  BMI 29.56 kg/m2 Estimated body mass index is 29.56 kg/(m^2) as calculated from the following:    Height as of this encounter: 5' 10\" (1.778 m).    Weight as of this encounter: 206 lb (93.4 kg).  Physical Exam      ASSESSMENT / PLAN:       [address] [address]    Dept: [phone]    PRE-OP EVALUATION:  Today's date: 10/23/2017    Roc Parkinson (: 1926) presents for pre-operative evaluation assessment as requested by Dr. Haider.  He requires evaluation and anesthesia risk assessment prior to undergoing surgery/procedure for treatment of lt eye .  Proposed procedure: cataract    Date of Surgery/ Procedure: 10/25/17  Time of Surgery/ Procedure:   Hospital/Surgical Facility: [facility]  Fax number for surgical facility:   Primary Physician: Dickson Reich  Type of Anesthesia Anticipated: Local with MAC    Patient has a Health Care Directive or Living Will:  YES     1. NO - Do you have a history of heart attack, stroke, stent, bypass or surgery on an artery in the head, neck, heart or legs?  2. NO - Do you ever have any pain or discomfort in your chest?  3. NO - Do you have a history of  Heart Failure?  4. NO - Are you troubled by shortness of breath when: walking on the level, up a slight hill or at night?  5. NO - Do you currently have a cold, " bronchitis or other respiratory infection?  6. NO - Do you have a cough, shortness of breath or wheezing?  7. NO - Do you sometimes get pains in the calves of your legs when you walk?  8. NO - Do you or anyone in your family have previous history of blood clots?  9. NO - Do you or does anyone in your family have a serious bleeding problem such as prolonged bleeding following surgeries or cuts?  10. YES - HAVE YOU EVER HAD PROBLEMS WITH ANEMIA OR BEEN TOLD TO TAKE IRON PILLS? See 12  11. NO - Have you had any abnormal blood loss such as black, tarry or bloody stools, or abnormal vaginal bleeding?  12. YES - HAVE YOU EVER HAD A BLOOD TRANSFUSION? 2017 through oncology  13. NO - Have you or any of your relatives ever had problems with anesthesia?  14. NO - Do you have sleep apnea, excessive snoring or daytime drowsiness?  15. NO - Do you have any prosthetic heart valves?  16. NO - Do you have prosthetic joints?  17. NO - Is there any chance that you may be pregnant?        HPI:                                                      Brief HPI related to upcoming procedure: problems with vision due to cataracts      See problem list for active medical problems.  Problems all longstanding and stable, except as noted/documented.  See ROS for pertinent symptoms related to these conditions.                                                                                                  .    MEDICAL HISTORY:                                                    Patient Active Problem List    Diagnosis Date Noted     Chemotherapy-induced neutropenia (H) 06/27/2017     Priority: Medium     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  Document previously  scanned on 12/13/16.  Order reviewed and found to be valid.  Code Status needs to be updated to reflect choices in most recent ACP document. Order for DNR/DNI.  Confirmed/documented designated decision maker(s).  Added by Kath Danielle RN, Advance Care Planning Liaison.         Hyperkalemia 02/22/2017     Priority: Medium     Urinary tract infection 02/14/2017     Priority: Medium     Hyperglycemia 12/17/2016     Priority: Medium     Hypercalcemia 12/15/2016     Priority: Medium     Multiple myeloma not having achieved remission (H) 11/23/2016     Priority: Medium     GIB (gastrointestinal bleeding) 11/13/2016     Priority: Medium     Macrocytic anemia 10/01/2016     Priority: Medium     Long-term (current) use of anticoagulants [Z79.01] 03/21/2016     Priority: Medium     Hypothyroidism 02/29/2016     Priority: Medium     CKD (chronic kidney disease) stage 3, GFR 30-59 ml/min 10/14/2015     Priority: Medium     Hyperlipidemia      Priority: Medium     Health Care Home 08/19/2014     Priority: Medium     State Tier Level:  Tier 1  Status:  Declined  Care Coordinator:  Olga Lidia Oneil Rn  See Letters for [facility] Care Plan  Date:  August 19, 2014         A-fib (H) 08/14/2014     Priority: Medium     Obesity 08/12/2014     Priority: Medium     UTI (urinary tract infection) w hx of recurrence 08/12/2014     Priority: Medium     Iron deficiency anemia 08/12/2014     Priority: Medium     Nonsmoker 08/12/2014     Priority: Medium     Microalbuminuria 11/12/2013     Priority: Medium     Irritable bowel syndrome 10/09/2013     Priority: Medium     Vitamin D deficiency disease 10/09/2013     Priority: Medium     Type 2 diabetes mellitus with stage 3 chronic kidney disease, with long-term current use of insulin (H) 07/29/2013     Priority: Medium     CTS (carpal tunnel syndrome) 01/28/2013     Priority: Medium     Oral lesion 12/23/2012     Priority: Medium     Dysphagia 10/22/2012     Priority: Medium     Problem list name updated  by automated process. Provider to review       Malignant neoplasm of prostate (H) 10/22/2012     Priority: Medium     Malignant neoplasm of bladder (H) 10/22/2012     Priority: Medium     Problem list name updated by automated process. 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     Past Surgical History:   Procedure Laterality Date     ARTHROPLASTY KNEE  11/5/2012    Procedure: ARTHROPLASTY KNEE;  RIGHT TOTAL KNEE ARTHROPLASTY (SMITH & NEPHEW)^;  Surgeon: Dickson Schulte MD;  Location:  OR     BIOPSY      bladder     COLONOSCOPY  2007     COLONOSCOPY N/A 11/15/2016    Procedure: COMBINED COLONOSCOPY, SINGLE OR MULTIPLE BIOPSY/POLYPECTOMY BY BIOPSY;  Surgeon: Kenneth Fulton MD;  Location:  GI     GENITOURINARY SURGERY      TURP x2 and bladder scraping     GI SURGERY      anal fistula     ORTHOPEDIC SURGERY      lt knee in nov.2009     SOFT TISSUE SURGERY      melanoma     Current Outpatient Prescriptions   Medication Sig Dispense Refill     lisinopril (PRINIVIL,ZESTRIL) 30 MG tablet TAKE 1 TABLET BY MOUTH EVERY DAY 90 tablet 0     cyclophosphamide (CYTOXAN) 50 MG capsule CHEMO Take 6 capsules (300 mg) by mouth once a week 24 capsule 0     dexamethasone (DECADRON) 4 MG tablet Take 10 tablets (40 mg) by mouth every 7 days for 4 doses Take daily on Days 1, 8, 15, and 22. 40 tablet 0     glipiZIDE (GLUCOTROL) 10 MG tablet TAKE 1 TABLET BY MOUTH TWICE DAILY BEFORE A MEAL 180 tablet 1     insulin aspart (NOVOLOG PEN) 100 UNIT/ML injection Inject 11 units before  breakfast, 10 units before lunch, 9 units before dinner, and 5 units at bedtime.  Before meal correction:  140-175 add 1 unit   176-210 add 2 units   211-245 add 3 units   246-280 add 4 units   281-315 add 5 units  316-350 add 6 units   351-385 add 7 units  386-420 add 8 units   Over 420 add 9 units     12 mL 3     tamsulosin (FLOMAX) 0.4 MG capsule TAKE 1 CAPSULE BY MOUTH DAILY 90 capsule 2     insulin detemir (LEVEMIR FLEXPEN/FLEXTOUCH) 100 UNIT/ML injection Inject 14 Units Subcutaneous every morning (before breakfast) On Wednesday and Thursday morning, inject 16 units. 15 mL 1     B-D U/F 31G X 8 MM insulin pen needle USE 5 PEN NEEDLES PER DAY OR AS DIRECTED 500 each 1     furosemide (LASIX) 20 MG tablet Take 1 tablet (20 mg) by mouth daily 30 tablet 3     FREESTYLE LITE test strip USE 1 STRIP TO TEST TWICE DAILY. 200 strip 1     cyclophosphamide (CYTOXAN) 50 MG capsule CHEMO Take 6 capsules (300 mg) by mouth once a week 24 capsule 0     levothyroxine (SYNTHROID/LEVOTHROID) 25 MCG tablet TAKE 1 TABLET BY MOUTH EVERY DAY. 90 tablet 0     metoprolol (LOPRESSOR) 25 MG tablet TAKE 1 TABLET BY MOUTH TWICE DAILY 180 tablet 1     cyclophosphamide (CYTOXAN) 50 MG capsule CHEMO Take 6 capsules (300 mg) by mouth once a week 24 capsule 0     amLODIPine (NORVASC) 5 MG tablet TAKE 1 TABLET BY MOUTH DAILY 30 tablet 5     gemfibrozil (LOPID) 600 MG tablet TAKE 1 TABLET BY MOUTH TWICE DAILY 180 tablet 1     omeprazole (PRILOSEC) 20 MG CR capsule TAKE 1 CAPSULE BY MOUTH EVERY DAY 90 capsule 3     B-D U/F 31G X 8 MM insulin pen needle        polyethylene glycol (MIRALAX/GLYCOLAX) powder        acyclovir (ZOVIRAX) 400 MG tablet Take 1 tablet (400 mg) by mouth 2 times daily Viral Prophylaxis. 60 tablet 3     prochlorperazine (COMPAZINE) 5 MG tablet Take 1 tablet (5 mg) by mouth every 6 hours as needed (Nausea/Vomiting) 30 tablet 2     ondansetron (ZOFRAN) 8 MG tablet Take 1 tablet (8 mg) by mouth every 8 hours as needed  "(Nausea/Vomiting) 10 tablet 2     Ascorbic Acid (VITAMIN C PO) Take 1,000 mg by mouth daily       Cholecalciferol (VITAMIN D3 PO) Take 1,000 Units by mouth daily       ferrous sulfate (IRON) 325 (65 FE) MG tablet Take 325 mg by mouth daily (with breakfast)       lisinopril (PRINIVIL,ZESTRIL) 30 MG tablet TAKE 1 TABLET BY MOUTH EVERY DAY 90 tablet 3     Selenium 200 MCG TABS Take 100 mcg by mouth daily. Pt takes one half tab of the 200 mcg daily        OTC products: None, except as noted above    No Known Allergies   Latex Allergy: NO    Social History   Substance Use Topics     Smoking status: Never Smoker     Smokeless tobacco: Never Used     Alcohol use No     History   Drug Use No       REVIEW OF SYSTEMS:                                                    C: NEGATIVE for fever, chills, change in weight  I: NEGATIVE for worrisome rashes, moles or lesions  E: NEGATIVE for vision changes or irritation  E/M: NEGATIVE for ear, mouth and throat problems  R: NEGATIVE for significant cough or SOB  B: NEGATIVE for masses, tenderness or discharge  CV: NEGATIVE for chest pain, palpitations or peripheral edema  GI: POSITIVE for dyspepsia  : NEGATIVE for frequency, dysuria, or hematuria  M: NEGATIVE for significant arthralgias or myalgia  N: NEGATIVE for weakness, dizziness or paresthesias  E: NEGATIVE for temperature intolerance, skin/hair changes  H: NEGATIVE for bleeding problems  P: NEGATIVE for changes in mood or affect    EXAM:                                                    /54  Pulse 72  Temp 97.2  F (36.2  C) (Tympanic)  Resp 16  Ht 5' 10\" (1.778 m)  Wt 206 lb (93.4 kg)  SpO2 99%  BMI 29.56 kg/m2    GENERAL APPEARANCE: healthy, alert and no distress     EYES: EOMI,  PERRL     HENT: ear canals and TM's normal and nose and mouth without ulcers or lesions     NECK: no adenopathy, no asymmetry, masses, or scars and thyroid normal to palpation     RESP: lungs clear to auscultation - no rales, rhonchi or " wheezes     CV: regular rates and rhythm, normal S1 S2, no S3 or S4 and no murmur, click or rub     ABDOMEN:  soft, nontender, no HSM or masses and bowel sounds normal     MS: extremities normal- no gross deformities noted, no evidence of inflammation in joints, FROM in all extremities.     SKIN: no suspicious lesions or rashes     NEURO: Normal strength and tone, sensory exam grossly normal, mentation intact and speech normal     PSYCH: mentation appears normal. and affect normal/bright     LYMPHATICS: No axillary, cervical, or supraclavicular nodes    DIAGNOSTICS:                                                    No labs or EKG required for low risk surgery (cataract, skin procedure, breast biopsy, etc)    Recent Labs   Lab Test  10/18/17   1050  09/27/17   0955   05/23/17   1014   02/13/17   1536   11/20/16   0627   11/14/16   0600   HGB  9.8*  9.4*   < >   --    < >  8.2*   < >   --    < >  7.0*   PLT  142*  142*   < >   --    < >  40*   < >   --    < >   --    INR   --    --    --    --    --   1.15*   --   1.46*   < >  1.75*   NA  140  138   < >   --    < >  135   < >   --    < >  138   POTASSIUM  4.9  4.7   < >   --    < >  5.6*   < >   --    < >  3.9   CR  1.09  1.08   < >   --    < >  0.95   < >   --    < >  1.16   A1C   --    --    --   8.0*   --    --    --    --    --   8.0*    < > = values in this interval not displayed.        IMPRESSION:                                                    Reason for surgery/procedure: increasing problems with vision due to cataracts    The proposed surgical procedure is considered LOW risk.    REVISED CARDIAC RISK INDEX  The patient has the following serious cardiovascular risks for perioperative complications such as (MI, PE, VFib and 3  AV Block):  No serious cardiac risks  INTERPRETATION: 0 risks: Class I (very low risk - 0.4% complication rate)    The patient has the following additional risks for perioperative complications:  No identified additional risks       ICD-10-CM    1. Preop general physical exam Z01.818    2. Other age-related cataract of both eyes H25.89    3. Type 2 diabetes mellitus with stage 3 chronic kidney disease, with long-term current use of insulin (H) E11.22     N18.3     Z79.4    4. CKD (chronic kidney disease) stage 3, GFR 30-59 ml/min N18.3    5. Essential hypertension, benign I10    6. Multiple myeloma not having achieved remission (H) C90.00        RECOMMENDATIONS:                                                              APPROVAL GIVEN to proceed with proposed procedure, without further diagnostic evaluation       Signed Electronically by: Dickson Reich MD    Copy of this evaluation report is provided to requesting physician.    [place] Preop Guidelines

## 2017-10-23 NOTE — PATIENT INSTRUCTIONS
Preventive Health Recommendations:   Male Ages 65 and over    Yearly exam:             See your health care provider every year in order to  o   Review health changes.   o   Discuss preventive care.    o   Review your medicines if your doctor has prescribed any.    Talk with your health care provider about whether you should have a test to screen for prostate cancer (PSA).    Every 3 years, have a diabetes test (fasting glucose). If you are at risk for diabetes, you should have this test more often.    Every 5 years, have a cholesterol test. Have this test more often if you are at risk for high cholesterol or heart disease.     Every 10 years, have a colonoscopy. Or, have a yearly FIT test (stool test). These exams will check for colon cancer.    Talk to with your health care provider about screening for Abdominal Aortic Aneurysm if you have a family history of AAA or have a history of smoking.    Shots:     Get a flu shot each year.     Get a tetanus shot every 10 years.     Talk to your doctor about your pneumonia vaccines. There are now two you should receive - Pneumovax (PPSV 23) and Prevnar (PCV 13).     Talk to your doctor about a shingles vaccine.     Talk to your doctor about the hepatitis B vaccine.  Nutrition:     Eat at least 5 servings of fruits and vegetables each day.     Eat whole-grain bread, whole-wheat pasta and brown rice instead of white grains and rice.     Talk to your provider about Calcium and Vitamin D.   Lifestyle    Exercise for at least 150 minutes a week (30 minutes a day, 5 days a week). This will help you control your weight and prevent disease.     Limit alcohol to one drink per day.     No smoking.     Wear sunscreen to prevent skin cancer.     See your dentist every six months for an exam and cleaning.     See your eye doctor every 1 to 2 years to screen for conditions such as glaucoma, macular degeneration, cataracts, etc   Before Your Surgery    Call your surgeon if there is any  change in your health. This includes signs of a cold or flu (such as a sore throat, runny nose, cough, rash or fever).  Do not smoke, drink alcohol or take over the counter medicine (unless your surgeon or primary care doctor tells you to) for the 24 hours before and after surgery.  If you take prescribed drugs: Follow your doctor s orders about which medicines to take and which to stop until after surgery.  Eating and drinking prior to surgery: follow the instructions from your surgeon  Take a shower or bath the night before surgery. Use the soap your surgeon gave you to gently clean your skin. If you do not have soap from your surgeon, use your regular soap. Do not shave or scrub the surgery site.  Wear clean pajamas and have clean sheets on your bed.

## 2017-10-24 NOTE — MR AVS SNAPSHOT
After Visit Summary   10/24/2017    Roc Parkinson    MRN: [id number]           Patient Information     Date Of Birth          7/7/1926        Visit Information        Provider Department      10/24/2017 9:10 AM Paul Bey, SILVANO [facility]        Today's Diagnoses     Dysuria    -  1    Nonspecific finding on examination of urine           Follow-ups after your visit        Your next 10 appointments already scheduled     Oct 25, 2017   Procedure with Shady Haider MD   [facility] [place] [facility] (--)    [address]  [place] [address]   [phone]            Nov 01, 2017   Procedure with Shady Haider MD   [facility] [place] [facility] (--)    [address]  [place] [address]   [phone]            Nov 07, 2017  9:30 AM CST   Diabetic Education with  DIABETIC ED RESOURCE   [facility] ([facility]    [address] [place] [address]   [phone]            Nov 08, 2017 11:30 AM CST   Level 1 with  INFUSION CHAIR 17   [facility] ([facility])    [facility]  [address]  [facility] [address]   [phone]            Nov 29, 2017 11:30 AM CST   Level 1 with SH INFUSION CHAIR 13   [facility] ([facility])    [facility]  [address]  [place] [address]   [phone]            Dec 20, 2017 11:00 AM CST   Level 1 with SH INFUSION CHAIR 5   [facility] ([facility])    [facility]  [address]  [facility] [address]   [phone]            Dec 20, 2017 11:30 AM CST   Return Visit with Gretel Quinn MD   [facility] ([facility])    [facility]  [address] [place] [address] [phone]              Who to contact     If you have questions or need follow up information about today's clinic visit or your schedule please contact [facility] directly at [phone].  Normal or non-critical lab and imaging results will be communicated to you by MyChart, letter or phone within 4 business days after the clinic has received the results. If you do not hear from us within 7 days, please contact the clinic through MyChart or phone. If you have a critical or abnormal lab result, we will notify you by phone as soon as possible.  Submit refill requests through [employer] or call your pharmacy and they will forward the refill request to us. Please allow 3 business days for your refill to be completed.          Additional Information About Your Visit        MyChart Information     [employer] lets you send messages to your doctor, view your test results, renew your prescriptions, schedule appointments and more. To sign up, go to [email or web address] . Click on \"Log in\" on the left side of the screen, which will take you to the Welcome page. Then click on \"Sign up Now\" on the right side of the page.     You will be asked to enter the access code listed below, as well as some personal information. Please follow the directions to create your username and password.     Your access code is: [email or web address]  Expires: 2017 10:12 AM     Your access code will  in 90 days. If you need help or a new code, please call your [place] clinic or [phone].        Care EveryWhere ID     This is your Care EveryWhere ID. This could be used by other organizations to access your [place] medical records  [id number]        Your Vitals Were     Pulse Temperature Respirations Pulse Oximetry BMI (Body Mass Index)       72 98.1  F (36.7  C) (Oral) 16 96% 30 kg/m2        Blood Pressure from Last 3 Encounters:   10/24/17 137/68   10/23/17 110/54   10/18/17 " 147/71    Weight from Last 3 Encounters:   10/24/17 209 lb 1.6 oz (94.8 kg)   10/23/17 206 lb (93.4 kg)   10/18/17 209 lb (94.8 kg)              We Performed the Following     UA with Microscopic reflex to Culture     Urine Culture Aerobic Bacterial          Today's Medication Changes          These changes are accurate as of: 10/24/17  9:44 AM.  If you have any questions, ask your nurse or doctor.               Start taking these medicines.        Dose/Directions    ciprofloxacin 250 MG tablet   Commonly known as:  CIPRO   Used for:  Dysuria, Nonspecific finding on examination of urine   Started by:  Paul Bey PA-C        Dose:  250 mg   Take 1 tablet (250 mg) by mouth 2 times daily for 10 days   Quantity:  20 tablet   Refills:  0            Where to get your medicines      These medications were sent to [facility] [address] [address]     Phone:  [phone]     ciprofloxacin 250 MG tablet                Primary Care Provider Office Phone # Fax #    Dickson Reich [phone] [address] [facility] [address]        Equal Access to Services     ELIE GAGNON AH: Hadii [place] ku hadasho Soomaali, waaxda luqadaha, qaybta kaalmada adeegyada, melita rodriguez. So [facility] [phone].    ATENCIÓN: Si habla español, tiene a nolan disposición servicios gratuitos de asistencia lingüística. Llame al [phone].    We comply with applicable federal civil rights laws and [place] laws. We do not discriminate on the basis of race, color, national origin, age, disability, sex, sexual orientation, or gender identity.            Thank you!     Thank you for choosing [facility]  for your care. Our goal is always to provide you with excellent care. Hearing back from our patients is one way we can continue to improve our services. Please take a few minutes to complete the  written survey that you may receive in the mail after your visit with us. Thank you!             Your Updated Medication List - Protect others around you: Learn how to safely use, store and throw away your medicines at www.disposemymeds.org.          This list is accurate as of: 10/24/17  9:44 AM.  Always use your most recent med list.                   Brand Name Dispense Instructions for use Diagnosis    acyclovir 400 MG tablet    ZOVIRAX    60 tablet    Take 1 tablet (400 mg) by mouth 2 times daily Viral Prophylaxis.    Multiple myeloma not having achieved remission (H)       amLODIPine 5 MG tablet    NORVASC    30 tablet    TAKE 1 TABLET BY MOUTH DAILY    Essential hypertension, benign       * B-D U/F 31G X 8 MM   Generic drug:  insulin pen needle           * B-D U/F 31G X 8 MM   Generic drug:  insulin pen needle     500 each    USE 5 PEN NEEDLES PER DAY OR AS DIRECTED    Type 2 diabetes mellitus with stage 3 chronic kidney disease, with long-term current use of insulin (H)       ciprofloxacin 250 MG tablet    CIPRO    20 tablet    Take 1 tablet (250 mg) by mouth 2 times daily for 10 days    Dysuria, Nonspecific finding on examination of urine       * cyclophosphamide 50 MG capsule CHEMO    CYTOXAN    24 capsule    Take 6 capsules (300 mg) by mouth once a week    Multiple myeloma not having achieved remission (H)       * cyclophosphamide 50 MG capsule CHEMO    CYTOXAN    24 capsule    Take 6 capsules (300 mg) by mouth once a week    Multiple myeloma not having achieved remission (H)       * cyclophosphamide 50 MG capsule CHEMO    CYTOXAN    24 capsule    Take 6 capsules (300 mg) by mouth once a week    Multiple myeloma not having achieved remission (H)       * dexamethasone 4 MG tablet    DECADRON    40 tablet    Take 10 tablets (40 mg) by mouth every 7 days for 4 doses Take daily on Days 1, 8, 15, and 22.    Multiple myeloma not having achieved remission (H)       * dexamethasone 4 MG tablet    DECADRON    40  tablet    Take 10 tablets (40 mg) by mouth every 7 days for 4 doses Take daily on Days 1, 8, 15, and 22.    Multiple myeloma not having achieved remission (H)       ferrous sulfate 325 (65 FE) MG tablet    IRON     Take 325 mg by mouth daily (with breakfast)        FREESTYLE LITE test strip   Generic drug:  blood glucose monitoring     200 strip    USE 1 STRIP TO TEST TWICE DAILY.    Uncontrolled type 1 diabetes mellitus with stage 3 chronic kidney disease (H)       furosemide 20 MG tablet    LASIX    30 tablet    Take 1 tablet (20 mg) by mouth daily    Multiple myeloma not having achieved remission (H)       gemfibrozil 600 MG tablet    LOPID    180 tablet    TAKE 1 TABLET BY MOUTH TWICE DAILY    Hyperlipidemia       glipiZIDE 10 MG tablet    GLUCOTROL    180 tablet    TAKE 1 TABLET BY MOUTH TWICE DAILY BEFORE A MEAL    Type 2 diabetes mellitus with stage 3 chronic kidney disease, with long-term current use of insulin (H)       insulin aspart 100 UNIT/ML injection    NovoLOG PEN    12 mL    Inject 11 units before breakfast, 10 units before lunch, 9 units before dinner, and 5 units at bedtime. Before meal correction: 140-175 add 1 unit  176-210 add 2 units  211-245 add 3 units  246-280 add 4 units  281-315 add 5 units 316-350 add 6 units  351-385 add 7 units 386-420 add 8 units  Over 420 add 9 units    Uncontrolled type 2 diabetes mellitus with hyperglycemia, unspecified long term insulin use status (H)       insulin detemir 100 UNIT/ML injection    LEVEMIR FLEXPEN/FLEXTOUCH    15 mL    Inject 14 Units Subcutaneous every morning (before breakfast) On Wednesday and Thursday morning, inject 16 units.    Type 2 diabetes mellitus with diabetic chronic kidney disease, unspecified CKD stage, unspecified long term insulin use status (H)       levothyroxine 25 MCG tablet    SYNTHROID/LEVOTHROID    90 tablet    TAKE 1 TABLET BY MOUTH EVERY DAY.    Acquired hypothyroidism       * lisinopril 30 MG tablet    PRINIVILZESTRIL     90 tablet    TAKE 1 TABLET BY MOUTH EVERY DAY    Essential hypertension, benign       * lisinopril 30 MG tablet    PRINIVIL,ZESTRIL    90 tablet    TAKE 1 TABLET BY MOUTH EVERY DAY    Essential hypertension, benign       metoprolol 25 MG tablet    LOPRESSOR    180 tablet    TAKE 1 TABLET BY MOUTH TWICE DAILY    Essential hypertension       omeprazole 20 MG CR capsule    priLOSEC    90 capsule    TAKE 1 CAPSULE BY MOUTH EVERY DAY    Congenital hiatus hernia       ondansetron 8 MG tablet    ZOFRAN    10 tablet    Take 1 tablet (8 mg) by mouth every 8 hours as needed (Nausea/Vomiting)    Multiple myeloma not having achieved remission (H)       polyethylene glycol powder    MIRALAX/GLYCOLAX          prochlorperazine 5 MG tablet    COMPAZINE    30 tablet    Take 1 tablet (5 mg) by mouth every 6 hours as needed (Nausea/Vomiting)    Multiple myeloma not having achieved remission (H)       Selenium 200 MCG Tabs      Take 100 mcg by mouth daily. Pt takes one half tab of the 200 mcg daily        tamsulosin 0.4 MG capsule    FLOMAX    90 capsule    TAKE 1 CAPSULE BY MOUTH DAILY    Malignant neoplasm of prostate (H)       VITAMIN C PO      Take 1,000 mg by mouth daily        VITAMIN D3 PO      Take 1,000 Units by mouth daily        * Notice:  This list has 9 medication(s) that are the same as other medications prescribed for you. Read the directions carefully, and ask your doctor or other care provider to review them with you.

## 2017-10-24 NOTE — H&P (VIEW-ONLY)
"SUBJECTIVE:         Physical   Annual:     Getting at least 3 servings of Calcium per day::  Yes    Bi-annual eye exam::  Yes    Dental care twice a year::  Yes    Sleep apnea or symptoms of sleep apnea::  None    Diet::  Regular (no restrictions)    Frequency of exercise::  None    Taking medications regularly::  Yes    Medication side effects::  None    Additional concerns today::  No                      Review of Systems      OBJECTIVE:   /54  Pulse 72  Temp 97.2  F (36.2  C) (Tympanic)  Resp 16  Ht 5' 10\" (1.778 m)  Wt 206 lb (93.4 kg)  SpO2 99%  BMI 29.56 kg/m2 Estimated body mass index is 29.56 kg/(m^2) as calculated from the following:    Height as of this encounter: 5' 10\" (1.778 m).    Weight as of this encounter: 206 lb (93.4 kg).  Physical Exam      ASSESSMENT / PLAN:       [address] [address]    Dept: [phone]    PRE-OP EVALUATION:  Today's date: 10/23/2017    Roc Parkinson (: 1926) presents for pre-operative evaluation assessment as requested by Dr. Haider.  He requires evaluation and anesthesia risk assessment prior to undergoing surgery/procedure for treatment of lt eye .  Proposed procedure: cataract    Date of Surgery/ Procedure: 10/25/17  Time of Surgery/ Procedure:   Hospital/Surgical Facility: [facility]  Fax number for surgical facility:   Primary Physician: Dickson Reich  Type of Anesthesia Anticipated: Local with MAC    Patient has a Health Care Directive or Living Will:  YES     1. NO - Do you have a history of heart attack, stroke, stent, bypass or surgery on an artery in the head, neck, heart or legs?  2. NO - Do you ever have any pain or discomfort in your chest?  3. NO - Do you have a history of  Heart Failure?  4. NO - Are you troubled by shortness of breath when: walking on the level, up a slight hill or at night?  5. NO - Do you currently have a cold, " bronchitis or other respiratory infection?  6. NO - Do you have a cough, shortness of breath or wheezing?  7. NO - Do you sometimes get pains in the calves of your legs when you walk?  8. NO - Do you or anyone in your family have previous history of blood clots?  9. NO - Do you or does anyone in your family have a serious bleeding problem such as prolonged bleeding following surgeries or cuts?  10. YES - HAVE YOU EVER HAD PROBLEMS WITH ANEMIA OR BEEN TOLD TO TAKE IRON PILLS? See 12  11. NO - Have you had any abnormal blood loss such as black, tarry or bloody stools, or abnormal vaginal bleeding?  12. YES - HAVE YOU EVER HAD A BLOOD TRANSFUSION? 2017 through oncology  13. NO - Have you or any of your relatives ever had problems with anesthesia?  14. NO - Do you have sleep apnea, excessive snoring or daytime drowsiness?  15. NO - Do you have any prosthetic heart valves?  16. NO - Do you have prosthetic joints?  17. NO - Is there any chance that you may be pregnant?        HPI:                                                      Brief HPI related to upcoming procedure: problems with vision due to cataracts      See problem list for active medical problems.  Problems all longstanding and stable, except as noted/documented.  See ROS for pertinent symptoms related to these conditions.                                                                                                  .    MEDICAL HISTORY:                                                    Patient Active Problem List    Diagnosis Date Noted     Chemotherapy-induced neutropenia (H) 06/27/2017     Priority: Medium     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  Document previously  scanned on 12/13/16.  Order reviewed and found to be valid.  Code Status needs to be updated to reflect choices in most recent ACP document. Order for DNR/DNI.  Confirmed/documented designated decision maker(s).  Added by Kath Danielle RN, Advance Care Planning Liaison.         Hyperkalemia 02/22/2017     Priority: Medium     Urinary tract infection 02/14/2017     Priority: Medium     Hyperglycemia 12/17/2016     Priority: Medium     Hypercalcemia 12/15/2016     Priority: Medium     Multiple myeloma not having achieved remission (H) 11/23/2016     Priority: Medium     GIB (gastrointestinal bleeding) 11/13/2016     Priority: Medium     Macrocytic anemia 10/01/2016     Priority: Medium     Long-term (current) use of anticoagulants [Z79.01] 03/21/2016     Priority: Medium     Hypothyroidism 02/29/2016     Priority: Medium     CKD (chronic kidney disease) stage 3, GFR 30-59 ml/min 10/14/2015     Priority: Medium     Hyperlipidemia      Priority: Medium     Health Care Home 08/19/2014     Priority: Medium     State Tier Level:  Tier 1  Status:  Declined  Care Coordinator:  Olga Lidia Oneil Rn  See Letters for [facility]  Date:  August 19, 2014         A-fib (H) 08/14/2014     Priority: Medium     Obesity 08/12/2014     Priority: Medium     UTI (urinary tract infection) w hx of recurrence 08/12/2014     Priority: Medium     Iron deficiency anemia 08/12/2014     Priority: Medium     Nonsmoker 08/12/2014     Priority: Medium     Microalbuminuria 11/12/2013     Priority: Medium     Irritable bowel syndrome 10/09/2013     Priority: Medium     Vitamin D deficiency disease 10/09/2013     Priority: Medium     Type 2 diabetes mellitus with stage 3 chronic kidney disease, with long-term current use of insulin (H) 07/29/2013     Priority: Medium     CTS (carpal tunnel syndrome) 01/28/2013     Priority: Medium     Oral lesion 12/23/2012     Priority: Medium     Dysphagia 10/22/2012     Priority: Medium     Problem list name updated  by automated process. Provider to review       Malignant neoplasm of prostate (H) 10/22/2012     Priority: Medium     Malignant neoplasm of bladder (H) 10/22/2012     Priority: Medium     Problem list name updated by automated process. 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     Past Surgical History:   Procedure Laterality Date     ARTHROPLASTY KNEE  11/5/2012    Procedure: ARTHROPLASTY KNEE;  RIGHT TOTAL KNEE ARTHROPLASTY (SMITH & NEPHEW)^;  Surgeon: Dickson Schulte MD;  Location:  OR     BIOPSY      bladder     COLONOSCOPY  2007     COLONOSCOPY N/A 11/15/2016    Procedure: COMBINED COLONOSCOPY, SINGLE OR MULTIPLE BIOPSY/POLYPECTOMY BY BIOPSY;  Surgeon: Kenneth Fulton MD;  Location:  GI     GENITOURINARY SURGERY      TURP x2 and bladder scraping     GI SURGERY      anal fistula     ORTHOPEDIC SURGERY      lt knee in nov.2009     SOFT TISSUE SURGERY      melanoma     Current Outpatient Prescriptions   Medication Sig Dispense Refill     lisinopril (PRINIVIL,ZESTRIL) 30 MG tablet TAKE 1 TABLET BY MOUTH EVERY DAY 90 tablet 0     cyclophosphamide (CYTOXAN) 50 MG capsule CHEMO Take 6 capsules (300 mg) by mouth once a week 24 capsule 0     dexamethasone (DECADRON) 4 MG tablet Take 10 tablets (40 mg) by mouth every 7 days for 4 doses Take daily on Days 1, 8, 15, and 22. 40 tablet 0     glipiZIDE (GLUCOTROL) 10 MG tablet TAKE 1 TABLET BY MOUTH TWICE DAILY BEFORE A MEAL 180 tablet 1     insulin aspart (NOVOLOG PEN) 100 UNIT/ML injection Inject 11 units before  breakfast, 10 units before lunch, 9 units before dinner, and 5 units at bedtime.  Before meal correction:  140-175 add 1 unit   176-210 add 2 units   211-245 add 3 units   246-280 add 4 units   281-315 add 5 units  316-350 add 6 units   351-385 add 7 units  386-420 add 8 units   Over 420 add 9 units     12 mL 3     tamsulosin (FLOMAX) 0.4 MG capsule TAKE 1 CAPSULE BY MOUTH DAILY 90 capsule 2     insulin detemir (LEVEMIR FLEXPEN/FLEXTOUCH) 100 UNIT/ML injection Inject 14 Units Subcutaneous every morning (before breakfast) On Wednesday and Thursday morning, inject 16 units. 15 mL 1     B-D U/F 31G X 8 MM insulin pen needle USE 5 PEN NEEDLES PER DAY OR AS DIRECTED 500 each 1     furosemide (LASIX) 20 MG tablet Take 1 tablet (20 mg) by mouth daily 30 tablet 3     FREESTYLE LITE test strip USE 1 STRIP TO TEST TWICE DAILY. 200 strip 1     cyclophosphamide (CYTOXAN) 50 MG capsule CHEMO Take 6 capsules (300 mg) by mouth once a week 24 capsule 0     levothyroxine (SYNTHROID/LEVOTHROID) 25 MCG tablet TAKE 1 TABLET BY MOUTH EVERY DAY. 90 tablet 0     metoprolol (LOPRESSOR) 25 MG tablet TAKE 1 TABLET BY MOUTH TWICE DAILY 180 tablet 1     cyclophosphamide (CYTOXAN) 50 MG capsule CHEMO Take 6 capsules (300 mg) by mouth once a week 24 capsule 0     amLODIPine (NORVASC) 5 MG tablet TAKE 1 TABLET BY MOUTH DAILY 30 tablet 5     gemfibrozil (LOPID) 600 MG tablet TAKE 1 TABLET BY MOUTH TWICE DAILY 180 tablet 1     omeprazole (PRILOSEC) 20 MG CR capsule TAKE 1 CAPSULE BY MOUTH EVERY DAY 90 capsule 3     B-D U/F 31G X 8 MM insulin pen needle        polyethylene glycol (MIRALAX/GLYCOLAX) powder        acyclovir (ZOVIRAX) 400 MG tablet Take 1 tablet (400 mg) by mouth 2 times daily Viral Prophylaxis. 60 tablet 3     prochlorperazine (COMPAZINE) 5 MG tablet Take 1 tablet (5 mg) by mouth every 6 hours as needed (Nausea/Vomiting) 30 tablet 2     ondansetron (ZOFRAN) 8 MG tablet Take 1 tablet (8 mg) by mouth every 8 hours as needed  "(Nausea/Vomiting) 10 tablet 2     Ascorbic Acid (VITAMIN C PO) Take 1,000 mg by mouth daily       Cholecalciferol (VITAMIN D3 PO) Take 1,000 Units by mouth daily       ferrous sulfate (IRON) 325 (65 FE) MG tablet Take 325 mg by mouth daily (with breakfast)       lisinopril (PRINIVIL,ZESTRIL) 30 MG tablet TAKE 1 TABLET BY MOUTH EVERY DAY 90 tablet 3     Selenium 200 MCG TABS Take 100 mcg by mouth daily. Pt takes one half tab of the 200 mcg daily        OTC products: None, except as noted above    No Known Allergies   Latex Allergy: NO    Social History   Substance Use Topics     Smoking status: Never Smoker     Smokeless tobacco: Never Used     Alcohol use No     History   Drug Use No       REVIEW OF SYSTEMS:                                                    C: NEGATIVE for fever, chills, change in weight  I: NEGATIVE for worrisome rashes, moles or lesions  E: NEGATIVE for vision changes or irritation  E/M: NEGATIVE for ear, mouth and throat problems  R: NEGATIVE for significant cough or SOB  B: NEGATIVE for masses, tenderness or discharge  CV: NEGATIVE for chest pain, palpitations or peripheral edema  GI: POSITIVE for dyspepsia  : NEGATIVE for frequency, dysuria, or hematuria  M: NEGATIVE for significant arthralgias or myalgia  N: NEGATIVE for weakness, dizziness or paresthesias  E: NEGATIVE for temperature intolerance, skin/hair changes  H: NEGATIVE for bleeding problems  P: NEGATIVE for changes in mood or affect    EXAM:                                                    /54  Pulse 72  Temp 97.2  F (36.2  C) (Tympanic)  Resp 16  Ht 5' 10\" (1.778 m)  Wt 206 lb (93.4 kg)  SpO2 99%  BMI 29.56 kg/m2    GENERAL APPEARANCE: healthy, alert and no distress     EYES: EOMI,  PERRL     HENT: ear canals and TM's normal and nose and mouth without ulcers or lesions     NECK: no adenopathy, no asymmetry, masses, or scars and thyroid normal to palpation     RESP: lungs clear to auscultation - no rales, rhonchi or " wheezes     CV: regular rates and rhythm, normal S1 S2, no S3 or S4 and no murmur, click or rub     ABDOMEN:  soft, nontender, no HSM or masses and bowel sounds normal     MS: extremities normal- no gross deformities noted, no evidence of inflammation in joints, FROM in all extremities.     SKIN: no suspicious lesions or rashes     NEURO: Normal strength and tone, sensory exam grossly normal, mentation intact and speech normal     PSYCH: mentation appears normal. and affect normal/bright     LYMPHATICS: No axillary, cervical, or supraclavicular nodes    DIAGNOSTICS:                                                    No labs or EKG required for low risk surgery (cataract, skin procedure, breast biopsy, etc)    Recent Labs   Lab Test  10/18/17   1050  09/27/17   0955   05/23/17   1014   02/13/17   1536   11/20/16   0627   11/14/16   0600   HGB  9.8*  9.4*   < >   --    < >  8.2*   < >   --    < >  7.0*   PLT  142*  142*   < >   --    < >  40*   < >   --    < >   --    INR   --    --    --    --    --   1.15*   --   1.46*   < >  1.75*   NA  140  138   < >   --    < >  135   < >   --    < >  138   POTASSIUM  4.9  4.7   < >   --    < >  5.6*   < >   --    < >  3.9   CR  1.09  1.08   < >   --    < >  0.95   < >   --    < >  1.16   A1C   --    --    --   8.0*   --    --    --    --    --   8.0*    < > = values in this interval not displayed.        IMPRESSION:                                                    Reason for surgery/procedure: increasing problems with vision due to cataracts    The proposed surgical procedure is considered LOW risk.    REVISED CARDIAC RISK INDEX  The patient has the following serious cardiovascular risks for perioperative complications such as (MI, PE, VFib and 3  AV Block):  No serious cardiac risks  INTERPRETATION: 0 risks: Class I (very low risk - 0.4% complication rate)    The patient has the following additional risks for perioperative complications:  No identified additional risks       ICD-10-CM    1. Preop general physical exam Z01.818    2. Other age-related cataract of both eyes H25.89    3. Type 2 diabetes mellitus with stage 3 chronic kidney disease, with long-term current use of insulin (H) E11.22     N18.3     Z79.4    4. CKD (chronic kidney disease) stage 3, GFR 30-59 ml/min N18.3    5. Essential hypertension, benign I10    6. Multiple myeloma not having achieved remission (H) C90.00        RECOMMENDATIONS:                                                              APPROVAL GIVEN to proceed with proposed procedure, without further diagnostic evaluation       Signed Electronically by: Dickson Reich MD    Copy of this evaluation report is provided to requesting physician.    [place] Preop Guidelines

## 2017-10-24 NOTE — PROGRESS NOTES
SUBJECTIVE:   Roc Parkinson is a 89 year old male who  presents today for a possible UTI. Symptoms of dysuria, urgency and frequency have been going on for 2day(s).  Hematuria yes .  sudden onsetand mild.  There is no history of fever, chills, nausea or vomiting.   This patient does  have a history of urinary tract infections. Patient denies long duration, rigors, flank pain, temperature > 101 degrees F. 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     No Known Allergies  Social History   Substance Use Topics     Smoking status: Never Smoker     Smokeless tobacco: Never Used     Alcohol use No       ROS:   CONSTITUTIONAL:NEGATIVE for fever, chills, change in weight  INTEGUMENTARY/SKIN: NEGATIVE for worrisome rashes, moles or lesions  GI: NEGATIVE for nausea, abdominal pain, heartburn, or change in bowel habits  : dysuria and frequency of urination  MUSCULOSKELETAL: NEGATIVE for significant arthralgias or myalgia  NEURO: NEGATIVE for weakness, dizziness or paresthesias    OBJECTIVE:  /68 (BP Location: Left arm, Patient Position: Chair, Cuff Size: Adult Regular)  Pulse 72  Temp 98.1  F (36.7  C) (Oral)  Resp 16  Wt 209 lb 1.6 oz (94.8 kg)  SpO2 96%  BMI 30 kg/m2  GENERAL APPEARANCE: healthy, alert and no distress  ABDOMEN:  soft, nontender, no HSM or masses and bowel sounds normal  BACK: No CVA tenderness  SKIN: no suspicious lesions or rashes  NEURO: Normal strength and tone, sensory exam grossly normal,  normal speech and mentation    Results for orders placed or performed in visit on 10/24/17   UA with Microscopic reflex to Culture   Result Value Ref Range    Color Urine Yellow     Appearance Urine  Slightly Cloudy     Glucose Urine Negative NEG^Negative mg/dL    Bilirubin Urine Negative NEG^Negative    Ketones Urine Negative NEG^Negative mg/dL    Specific Gravity Urine 1.010 1.003 - 1.035    pH Urine 5.5 5.0 - 7.0 pH    Protein Albumin Urine Trace (A) NEG^Negative mg/dL    Urobilinogen Urine 0.2 0.2 - 1.0 EU/dL    Nitrite Urine Positive (A) NEG^Negative    Blood Urine Small (A) NEG^Negative    Leukocyte Esterase Urine Large (A) NEG^Negative    Source Midstream Urine     WBC Urine  (A) OTO2^O - 2 /HPF    RBC Urine 2-5 (A) OTO2^O - 2 /HPF    Bacteria Urine Moderate (A) NEG^Negative /HPF       ASSESSMENT:   Lower, uncomplicated urinary tract infection.    PLAN:  Orders Placed This Encounter     UA with Microscopic reflex to Culture     ciprofloxacin (CIPRO) 250 MG tablet     Urine culture pending  Drink plenty of fluids.  Prevention and treatment of UTI's discussed.Signs and symptoms of pyelonephritis mentioned.    Follow up with primary care physician if not improving

## 2017-10-24 NOTE — NURSING NOTE
"Chief Complaint   Patient presents with     Urgent Care     Pt states frequently, burning sensation while urination 2x days        Initial /68 (BP Location: Left arm, Patient Position: Chair, Cuff Size: Adult Regular)  Pulse 72  Temp 98.1  F (36.7  C) (Oral)  Wt 209 lb 1.6 oz (94.8 kg)  SpO2 96%  BMI 30 kg/m2 Estimated body mass index is 30 kg/(m^2) as calculated from the following:    Height as of 10/23/17: 5' 10\" (1.778 m).    Weight as of this encounter: 209 lb 1.6 oz (94.8 kg).  Medication Reconciliation: complete      "

## 2017-10-25 NOTE — ANESTHESIA POSTPROCEDURE EVALUATION
Patient: Roc Parkinson    Procedure(s):  LEFT EYE PHACOEMULSIFICATION CLEAR CORNEA WITH STANDARD INTRAOCULAR LENS IMPLANT  - Wound Class: I-Clean    Diagnosis:CATARACT  Diagnosis Additional Information: No value filed.    Anesthesia Type:  MAC    Note:  Anesthesia Post Evaluation    Patient location during evaluation: PACU  Patient participation: Able to fully participate in evaluation  Level of consciousness: awake and alert  Pain management: satisfactory to patient  Airway patency: patent  Cardiovascular status: hemodynamically stable  Respiratory status: acceptable and unassisted  Hydration status: balanced  PONV: none     Anesthetic complications: None          Last vitals:  Vitals:    10/25/17 0819 10/25/17 1024 10/25/17 1055   BP: 143/68 139/67 152/69   Pulse: 90 64    Resp: 16 16 16   Temp: 36.1  C (97  F)     SpO2: 98% 97% 97%         Electronically Signed By: Olaf Mora MD  October 25, 2017  1:57 PM

## 2017-10-25 NOTE — IP AVS SNAPSHOT
[facility]    [address]    [place] [address]    Phone:  [phone]    Fax:  [phone]                                       After Visit Summary   10/25/2017    Roc Parkinson    MRN: [id number]           After Visit Summary Signature Page     I have received my discharge instructions, and my questions have been answered. I have discussed any challenges I see with this plan with the nurse or doctor.    ..........................................................................................................................................  Patient/Patient Representative Signature      ..........................................................................................................................................  Patient Representative Print Name and Relationship to Patient    ..................................................               ................................................  Date                                            Time    ..........................................................................................................................................  Reviewed by Signature/Title    ...................................................              ..............................................  Date                                                            Time

## 2017-10-25 NOTE — ANESTHESIA PREPROCEDURE EVALUATION
Anesthesia Evaluation     . Pt has had prior anesthetic.     No history of anesthetic complications          ROS/MED HX    ENT/Pulmonary:      (-) tobacco use and sleep apnea   Neurologic:      (-) seizures and CVA   Cardiovascular:     (+) Dyslipidemia, hypertension----. : . . . :. dysrhythmias a-fib, .       METS/Exercise Tolerance:     Hematologic:         Musculoskeletal:   (+) arthritis, , , -       GI/Hepatic:        (-) GERD and liver disease   Renal/Genitourinary:     (+) chronic renal disease, type: CRI,       Endo:     (+) type I DM, thyroid problem hypothyroidism, Obesity, .      Psychiatric:         Infectious Disease:         Malignancy:   (+) Malignancy History of Prostate, Other and Skin  Other CA bladder status post         Other:                     Physical Exam  Normal systems: cardiovascular and pulmonary    Airway   Mallampati: III  TM distance: >3 FB  Neck ROM: full    Dental   (+) missing    Cardiovascular       Pulmonary                     Anesthesia Plan      History & Physical Review  History and physical reviewed and following examination; no interval change.    ASA Status:  3 .    NPO Status:  > 8 hours    Plan for MAC Reason for MAC:  Procedure to face, neck, head or breast         Postoperative Care      Consents  Anesthetic plan, risks, benefits and alternatives discussed with:  Patient..        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     Past Surgical History:   Procedure Laterality Date     ARTHROPLASTY KNEE  11/5/2012    Procedure: ARTHROPLASTY KNEE;  RIGHT TOTAL KNEE ARTHROPLASTY (SMITH & NEPHEW)^;   Surgeon: Dickson Schulte MD;  Location:  OR     BIOPSY      bladder     COLONOSCOPY  2007     COLONOSCOPY N/A 11/15/2016    Procedure: COMBINED COLONOSCOPY, SINGLE OR MULTIPLE BIOPSY/POLYPECTOMY BY BIOPSY;  Surgeon: Kenneth Fulton MD;  Location:  GI     GENITOURINARY SURGERY      TURP x2 and bladder scraping     GI SURGERY      anal fistula     ORTHOPEDIC SURGERY      lt knee in nov.2009     SOFT TISSUE SURGERY      melanoma     Prior to Admission medications    Medication Sig Start Date End Date Taking? Authorizing Provider   lisinopril (PRINIVIL,ZESTRIL) 30 MG tablet TAKE 1 TABLET BY MOUTH EVERY DAY 10/17/17  Yes Dickson Reich MD   dexamethasone (DECADRON) 4 MG tablet Take 10 tablets (40 mg) by mouth every 7 days for 4 doses Take daily on Days 1, 8, 15, and 22. 10/17/17 11/8/17 Yes Gretel Quinn MD   glipiZIDE (GLUCOTROL) 10 MG tablet TAKE 1 TABLET BY MOUTH TWICE DAILY BEFORE A MEAL 10/16/17  Yes Dickson Reich MD   insulin aspart (NOVOLOG PEN) 100 UNIT/ML injection Inject 11 units before breakfast, 10 units before lunch, 9 units before dinner, and 5 units at bedtime.  Before meal correction:  140-175 add 1 unit   176-210 add 2 units   211-245 add 3 units   246-280 add 4 units   281-315 add 5 units  316-350 add 6 units   351-385 add 7 units  386-420 add 8 units   Over 420 add 9 units     10/11/17  Yes Dickson Reich MD   tamsulosin (FLOMAX) 0.4 MG capsule TAKE 1 CAPSULE BY MOUTH DAILY 10/2/17  Yes Dickson Reich MD   insulin detemir (LEVEMIR FLEXPEN/FLEXTOUCH) 100 UNIT/ML injection Inject 14 Units Subcutaneous every morning (before breakfast) On Wednesday and Thursday morning, inject 16 units. 9/26/17  Yes Dickson Reich MD   furosemide (LASIX) 20 MG tablet Take 1 tablet (20 mg) by mouth daily 9/6/17  Yes Gretel Quinn MD   levothyroxine (SYNTHROID/LEVOTHROID) 25 MCG tablet TAKE 1 TABLET BY MOUTH EVERY DAY. 8/8/17  Yes Dickson Reich MD    metoprolol (LOPRESSOR) 25 MG tablet TAKE 1 TABLET BY MOUTH TWICE DAILY 8/1/17  Yes Dickson Reich MD   cyclophosphamide (CYTOXAN) 50 MG capsule CHEMO Take 6 capsules (300 mg) by mouth once a week 7/27/17  Yes Gretel Quinn MD   amLODIPine (NORVASC) 5 MG tablet TAKE 1 TABLET BY MOUTH DAILY 7/12/17  Yes Dickson Reich MD   gemfibrozil (LOPID) 600 MG tablet TAKE 1 TABLET BY MOUTH TWICE DAILY 6/30/17  Yes Dickson Reich MD   omeprazole (PRILOSEC) 20 MG CR capsule TAKE 1 CAPSULE BY MOUTH EVERY DAY 3/13/17  Yes Dickson Reich MD   polyethylene glycol (MIRALAX/GLYCOLAX) powder  11/20/16  Yes Reported, Patient   Ascorbic Acid (VITAMIN C PO) Take 1,000 mg by mouth daily   Yes Unknown, Entered By History   Cholecalciferol (VITAMIN D3 PO) Take 1,000 Units by mouth daily   Yes Unknown, Entered By History   ferrous sulfate (IRON) 325 (65 FE) MG tablet Take 325 mg by mouth daily (with breakfast)   Yes Unknown, Entered By History   lisinopril (PRINIVIL,ZESTRIL) 30 MG tablet TAKE 1 TABLET BY MOUTH EVERY DAY 10/17/16  Yes Dickson Reich MD   Selenium 200 MCG TABS Take 100 mcg by mouth daily. Pt takes one half tab of the 200 mcg daily    Yes Reported, Patient   cyclophosphamide (CYTOXAN) 50 MG capsule CHEMO Take 6 capsules (300 mg) by mouth once a week 10/17/17   Gretel Quinn MD B-D U/F 31G X 8 MM insulin pen needle USE 5 PEN NEEDLES PER DAY OR AS DIRECTED 9/18/17   Dickson Reich MD   FREESTYLE LITE test strip USE 1 STRIP TO TEST TWICE DAILY.  Patient not taking: Reported on 10/24/2017 8/21/17   Dickson Reich MD   cyclophosphamide (CYTOXAN) 50 MG capsule CHEMO Take 6 capsules (300 mg) by mouth once a week 8/16/17   Gretel Quinn MD B-D U/F 31G X 8 MM insulin pen needle  1/31/17   Reported, Patient   acyclovir (ZOVIRAX) 400 MG tablet Take 1 tablet (400 mg) by mouth 2 times daily Viral Prophylaxis. 1/4/17   Gretel Quinn MD   prochlorperazine (COMPAZINE) 5 MG tablet  Take 1 tablet (5 mg) by mouth every 6 hours as needed (Nausea/Vomiting) 12/15/16   Rosalva Heath MD   ondansetron (ZOFRAN) 8 MG tablet Take 1 tablet (8 mg) by mouth every 8 hours as needed (Nausea/Vomiting) 12/15/16   Rosalva Heath MD     Current Facility-Administered Medications Ordered in Epic   Medication Dose Route Frequency Last Rate Last Dose     proparacaine (ALCAINE) 0.5 % ophthalmic solution 1 drop  1 drop Ophthalmic Once         lidocaine (AKTEN) ophthalmic gel 0.5 mL  0.5 mL Ophthalmic Once         povidone-iodine 5 % ophthalmic solution 1 drop  1 drop Ophthalmic Once         lidocaine 1 % 1 mL  1 mL Other Q1H PRN   1 mL at 10/25/17 0819     lactated ringers infusion  500 mL Intravenous Continuous 25 mL/hr at 10/25/17 0819 500 mL at 10/25/17 0819     No current [facility]-ordered outpatient prescriptions on file.     Wt Readings from Last 1 Encounters:   10/25/17 94.8 kg (209 lb 1.6 oz)     Temp Readings from Last 1 Encounters:   10/25/17 36.1  C (97  F) (Temporal)     BP Readings from Last 6 Encounters:   10/25/17 143/68   10/24/17 137/68   10/23/17 110/54   10/18/17 147/71   10/18/17 143/65   10/04/17 128/54     Pulse Readings from Last 4 Encounters:   10/25/17 90   10/24/17 72   10/23/17 72   10/18/17 62     Resp Readings from Last 1 Encounters:   10/25/17 16     SpO2 Readings from Last 1 Encounters:   10/25/17 98%     Recent Labs   Lab Test  10/18/17   1050  09/27/17   0955   NA  140  138   POTASSIUM  4.9  4.7   CHLORIDE  109  108   CO2  21  20   ANIONGAP  10  10   GLC  132*  256*   BUN  34*  35*   CR  1.09  1.08   MICHELLE  9.3  9.2     Recent Labs   Lab Test  10/18/17   1050  09/27/17   0955   WBC  3.9*  3.8*   HGB  9.8*  9.4*   PLT  142*  142*     Recent Labs   Lab Test  02/13/17   1536  11/20/16   0627   INR  1.15*  1.46*      RECENT LABS:   ECG:   ECHO:   CXR:                      .

## 2017-10-25 NOTE — OP NOTE
[facility]  Ophthalmology Operative Note    PREOPERATIVE DIAGNOSIS:  Dense cataract of the Left eye.       POSTOPERATIVE DIAGNOSIS:  Dense cataract of the Left eye.       OPERATION:  Clear corneal phacoemulsification with intraocular lens implant of the Left eye.       PROCEDURE:  Roc Parkinson was brought into the operating room with a topical anesthetic.  Under the microscope after a sterile ophthalmic prep, a lid speculum was put into place.  Anterior chamber was entered with a #75 blade.  Anterior chamber was then filled with lidocaine solution and a viscoelastic material.  A keratome blade was then used to fashion a 2.6 mm temporal incision in the corneoscleral junction and anterior capsule of the lens was opened using a circular capsulorrhexis.  The lens was carefully hydrodissected.  The phacoemulsification tip was then introduced into the eye and a central groove fashioned.  The nucleus was split in half and then quartered, and nuclear material was aspirated.  The irrigation-aspiration apparatus was then utilized to clear the remaining cortical material.  The posterior capsule was polished and a foldable posterior chamber intraocular lens was then introduced into the capsular bag, unfolded, and rotated into the horizontal position.  No sutures were necessary to close the incision site.  The viscoelastic material was aspirated.  Drops of Moxeza and Prolensa were used on the surface of the eye and a clear shield was put over the eye before the patient was dismissed from the operating room.  The patient tolerated the procedure without difficulty.       Implant Name Type Inv. Item Serial No.  Lot No. LRB No. Used   EYE IMP IOL FELICITA PCL TECNIS [id number] 19.5 Lens/Eye Implant EYE IMP IOL FELICITA PCL TECNIS [id number] 19.5 [id number] [employer]   Left 1           ELIAZAR SEGUNDO MD

## 2017-10-25 NOTE — IP AVS SNAPSHOT
MRN:[id number]                      After Visit Summary   10/25/2017    Roc Parkinson    MRN: [id number]           Thank you!     Thank you for choosing [facility] for your care. Our goal is always to provide you with excellent care. Hearing back from our patients is one way we can continue to improve our services. Please take a few minutes to complete the written survey that you may receive in the mail after you visit with us. Thank you!        Patient Information     Date Of Birth          7/7/1926        About your hospital stay     You were admitted on:  October 25, 2017 You last received care in the:  [facility]    You were discharged on:  October 25, 2017       Who to Call     For medical emergencies, please call 911.  For non-urgent questions about your medical care, please call your primary care provider or clinic, [phone]  For questions related to your surgery, please call your surgery clinic        Attending Provider     Provider Specialty    Shady Haider MD Ophthalmology       Primary Care Provider Office Phone # Fax #    Dickson Clark Reich [phone]      Your next 10 appointments already scheduled     Nov 01, 2017   Procedure with Shady Haider MD   [facility] (--)    [address]  [facility] [address] [phone]            Nov 07, 2017  9:30 AM CST   Diabetic Education with  DIABETIC ED RESOURCE   [facility] ([facility])    [address] [id number] [phone]            Nov 08, 2017 11:30 AM CST   Level 1 with  INFUSION CHAIR 17   [facility] ([facility])    [facility]  [address]  [facility] [address]   [phone]            Nov 29, 2017 11:30 AM CST   Level 1 with  INFUSION CHAIR 13   [facility] ([facility])    [facility]  [address]  [place] [address]   [phone]            Dec 20, 2017 11:00 AM CST   Level 1 with  INFUSION CHAIR 5   [facility] ([facility])    [facility] [place]  [address]  [place] [address]   [phone]            Dec 20, 2017 11:30 AM CST   Return Visit with Gretel Quinn MD   [facility] ([facility])    [facility]  [address]  [place] [address]   [phone]              Further instructions from your care team       [facility]est[facility] Discharge  Instructions  Anesthesia (Eye Care Center)   Adult Discharge Instructions    For 24 hours after surgery    1. Get plenty of rest.  Make arrangements to have a responsible adult stay with you for at least 6 hours after you leave the hospital.  2. Do not drive or use heavy equipment for 24 hours.    3. Do not drink alcohol for 24 hours.  4. Do not sign legal documents or make important decisions for 24 hours.  5. Avoid strenuous or risky activities. You may feel lightheaded.  If so, sit for a few minutes before standing.  Have someone help you get up.   6. Conscious sedation patients may resume a regular diet..  Any questions of medical nature, call your physician.    POST-OPERATIVE CARE FOLLOWING CATARACT SURGERY    DR. ELIAZAR SEGUNDO  [facility]  [phone]    Postoperative medications: After surgery, you will use several different eye drop medications. You may have either the brand specific form or generic of each type used.     Begin your eye drops today as directed.  You should instill the drop, close the eye without blinking and keep closed for 3 minutes allowing the drop to absorb.  It is fine to instill all 3 of the drops consecutively, waiting the 3 minutes in between each one. Place the  shield for sleep.  In the morning, instill 1 drop of all 3 eye drops before your post-op appointment.      Antibiotic  Moxeza - is an antibiotic drop that is used to minimize the risk of infection. Instill your first drop at bedtime tonight, then 2 times daily for one week.       -OR-    Ofloxacin - this generic antibiotic is to be used 4 times a day for one week, you can start using this drop after you get home, instilling 3 separate times on the day of surgery and then 4 times a day there after.     Anti-inflammatory   Prolensa - use it once daily until gone, beginning today.    -OR-    Ketorolac -this generic drop should be used 4 times daily until gone. You can start your drops when you get home, instilling 3 separate times on the day of surgery.     Steroid  Durezol - is a steroid drop used to minimize inflammation and modulate healing.  It should be used 2 times daily until gone, beginning with your first drop at bedtime tonight.    -OR-    Prednisolone - the generic form should be used 4 times daily for 3 weeks or until gone. You can start your first drop after you get home, instilling 3 separate times on the day of surgery.      Do not rub the operated eye. Wear the eye shield during sleeping hours for one week.    Light sensitivity may be noticed. Sunglasses may be worn for comfort.    Some discomfort and irritation may be noticed. Acetaminophen (Tylenol) or Ibuprofen (Advil) may be taken for discomfort.    Avoid bending over, strenuous activity or heavy lifting for one week.    Keep the operated eye dry.    You may wash your hair, bathe or shower, but keep the operated eye closed while doing so.    Use medication exactly as prescribed by your doctor.  You may restart your regular home medications.    Bring all materials and medications to the clinic on your first post-operative visit.    Call the doctor s office if any of the following should occur:  -  any sudden vision change  -  nausea or severe  "headache  -  increase in pain not controlled  -  increased amount of floaters (black spots in front of vision)         -  or signs of infection (pus, increasing redness or tenderness)            7. Revised 12/10/2015    Pending Results     Date and Time Order Name Status Description    10/24/2017 0931 URINE CULTURE AEROBIC BACTERIAL In process             Admission Information     Date & Time Provider Department Dept. Phone    10/25/2017 Shady Haider MD [facility] [phone]      Your Vitals Were     Blood Pressure Pulse Temperature Respirations Height Weight    139/67 64 97  F (36.1  C) (Temporal) 16 1.778 m (5' 10\") 94.8 kg (209 lb 1.6 oz)    Pulse Oximetry BMI (Body Mass Index)                97% 30 kg/m2          MyChart Information     [facility] lets you send messages to your doctor, view your test results, renew your prescriptions, schedule appointments and more. To sign up, go to [email or web address] . Click on \"Log in\" on the left side of the screen, which will take you to the Welcome page. Then click on \"Sign up Now\" on the right side of the page.     You will be asked to enter the access code listed below, as well as some personal information. Please follow the directions to create your username and password.     Your access code is: [email or web address]  Expires: 2017 10:12 AM     Your access code will  in 90 days. If you need help or a new code, please call your [facility] clinic or [phone].        Care EveryWhere ID     This is your Care EveryWhere ID. This could be used by other organizations to access your [facility] medical records  [id number]        Equal Access to Services     [facility]: Hadii [place]ia hsieh Soaimee, waaxda luqadaha, qaybta kaalmada adesadieyada, melita shaw . So [facility] [phone].    ATENCIÓN: Si habla español, tiene a nolan disposición servicios gratuitos de asistencia lingüística. Llame al [phone].    We comply " with applicable federal civil rights laws and [place] laws. We do not discriminate on the basis of race, color, national origin, age, disability, sex, sexual orientation, or gender identity.               Review of your medicines      UNREVIEWED medicines. Ask your doctor about these medicines        Dose / Directions    acyclovir 400 MG tablet   Commonly known as:  ZOVIRAX   Used for:  Multiple myeloma not having achieved remission (H)        Dose:  400 mg   Take 1 tablet (400 mg) by mouth 2 times daily Viral Prophylaxis.   Quantity:  60 tablet   Refills:  3       amLODIPine 5 MG tablet   Commonly known as:  NORVASC   Used for:  Essential hypertension, benign        TAKE 1 TABLET BY MOUTH DAILY   Quantity:  30 tablet   Refills:  5       * cyclophosphamide 50 MG capsule CHEMO   Commonly known as:  CYTOXAN   Used for:  Multiple myeloma not having achieved remission (H)        Dose:  300 mg   Take 6 capsules (300 mg) by mouth once a week   Quantity:  24 capsule   Refills:  0       * cyclophosphamide 50 MG capsule CHEMO   Commonly known as:  CYTOXAN   Used for:  Multiple myeloma not having achieved remission (H)        Dose:  300 mg   Take 6 capsules (300 mg) by mouth once a week   Quantity:  24 capsule   Refills:  0       * cyclophosphamide 50 MG capsule CHEMO   Commonly known as:  CYTOXAN   Used for:  Multiple myeloma not having achieved remission (H)        Dose:  300 mg   Take 6 capsules (300 mg) by mouth once a week   Quantity:  24 capsule   Refills:  0       dexamethasone 4 MG tablet   Commonly known as:  DECADRON   Used for:  Multiple myeloma not having achieved remission (H)        Dose:  40 mg   Take 10 tablets (40 mg) by mouth every 7 days for 4 doses Take daily on Days 1, 8, 15, and 22.   Quantity:  40 tablet   Refills:  0       ferrous sulfate 325 (65 FE) MG tablet   Commonly known as:  IRON        Dose:  325 mg   Take 325 mg by mouth daily (with breakfast)   Refills:  0       furosemide 20 MG tablet    Commonly known as:  LASIX   Used for:  Multiple myeloma not having achieved remission (H)        Dose:  20 mg   Take 1 tablet (20 mg) by mouth daily   Quantity:  30 tablet   Refills:  3       gemfibrozil 600 MG tablet   Commonly known as:  LOPID   Used for:  Hyperlipidemia        TAKE 1 TABLET BY MOUTH TWICE DAILY   Quantity:  180 tablet   Refills:  1       glipiZIDE 10 MG tablet   Commonly known as:  GLUCOTROL   Used for:  Type 2 diabetes mellitus with stage 3 chronic kidney disease, with long-term current use of insulin (H)        TAKE 1 TABLET BY MOUTH TWICE DAILY BEFORE A MEAL   Quantity:  180 tablet   Refills:  1       insulin aspart 100 UNIT/ML injection   Commonly known as:  NovoLOG PEN   Used for:  Uncontrolled type 2 diabetes mellitus with hyperglycemia, unspecified long term insulin use status (H)        Inject 11 units before breakfast, 10 units before lunch, 9 units before dinner, and 5 units at bedtime. Before meal correction: 140-175 add 1 unit  176-210 add 2 units  211-245 add 3 units  246-280 add 4 units  281-315 add 5 units 316-350 add 6 units  351-385 add 7 units 386-420 add 8 units  Over 420 add 9 units   Quantity:  12 mL   Refills:  3       insulin detemir 100 UNIT/ML injection   Commonly known as:  LEVEMIR FLEXPEN/FLEXTOUCH   Used for:  Type 2 diabetes mellitus with diabetic chronic kidney disease, unspecified CKD stage, unspecified long term insulin use status (H)        Dose:  14 Units   Inject 14 Units Subcutaneous every morning (before breakfast) On Wednesday and Thursday morning, inject 16 units.   Quantity:  15 mL   Refills:  1       levothyroxine 25 MCG tablet   Commonly known as:  SYNTHROID/LEVOTHROID   Used for:  Acquired hypothyroidism        TAKE 1 TABLET BY MOUTH EVERY DAY.   Quantity:  90 tablet   Refills:  0       * lisinopril 30 MG tablet   Commonly known as:  PRINIVIL,ZESTRIL   Used for:  Essential hypertension, benign        TAKE 1 TABLET BY MOUTH EVERY DAY   Quantity:  90  tablet   Refills:  3       * lisinopril 30 MG tablet   Commonly known as:  PRINIVIL,ZESTRIL   Used for:  Essential hypertension, benign        TAKE 1 TABLET BY MOUTH EVERY DAY   Quantity:  90 tablet   Refills:  0       metoprolol 25 MG tablet   Commonly known as:  LOPRESSOR   Used for:  Essential hypertension        TAKE 1 TABLET BY MOUTH TWICE DAILY   Quantity:  180 tablet   Refills:  1       omeprazole 20 MG CR capsule   Commonly known as:  priLOSEC   Used for:  Congenital hiatus hernia        TAKE 1 CAPSULE BY MOUTH EVERY DAY   Quantity:  90 capsule   Refills:  3       ondansetron 8 MG tablet   Commonly known as:  ZOFRAN   Used for:  Multiple myeloma not having achieved remission (H)        Dose:  8 mg   Take 1 tablet (8 mg) by mouth every 8 hours as needed (Nausea/Vomiting)   Quantity:  10 tablet   Refills:  2       polyethylene glycol powder   Commonly known as:  MIRALAX/GLYCOLAX        Refills:  0       prochlorperazine 5 MG tablet   Commonly known as:  COMPAZINE   Used for:  Multiple myeloma not having achieved remission (H)        Dose:  5 mg   Take 1 tablet (5 mg) by mouth every 6 hours as needed (Nausea/Vomiting)   Quantity:  30 tablet   Refills:  2       Selenium 200 MCG Tabs        Dose:  100 mcg   Take 100 mcg by mouth daily. Pt takes one half tab of the 200 mcg daily   Refills:  0       tamsulosin 0.4 MG capsule   Commonly known as:  FLOMAX   Used for:  Malignant neoplasm of prostate (H)        TAKE 1 CAPSULE BY MOUTH DAILY   Quantity:  90 capsule   Refills:  2       VITAMIN C PO        Dose:  1000 mg   Take 1,000 mg by mouth daily   Refills:  0       VITAMIN D3 PO        Dose:  1000 Units   Take 1,000 Units by mouth daily   Refills:  0       * Notice:  This list has 5 medication(s) that are the same as other medications prescribed for you. Read the directions carefully, and ask your doctor or other care provider to review them with you.      CONTINUE these medicines which have NOT CHANGED        Dose  / Directions    * B-D U/F 31G X 8 MM   Generic drug:  insulin pen needle        Refills:  0       * B-D U/F 31G X 8 MM   Used for:  Type 2 diabetes mellitus with stage 3 chronic kidney disease, with long-term current use of insulin (H)   Generic drug:  insulin pen needle        USE 5 PEN NEEDLES PER DAY OR AS DIRECTED   Quantity:  500 each   Refills:  1       FREESTYLE LITE test strip   Used for:  Uncontrolled type 1 diabetes mellitus with stage 3 chronic kidney disease (H)   Generic drug:  blood glucose monitoring        USE 1 STRIP TO TEST TWICE DAILY.   Quantity:  200 strip   Refills:  1       * Notice:  This list has 2 medication(s) that are the same as other medications prescribed for you. Read the directions carefully, and ask your doctor or other care provider to review them with you.             Protect others around you: Learn how to safely use, store and throw away your medicines at www.disposemymeds.org.             Medication List: This is a list of all your medications and when to take them. Check marks below indicate your daily home schedule. Keep this list as a reference.      Medications           Morning Afternoon Evening Bedtime As Needed    acyclovir 400 MG tablet   Commonly known as:  ZOVIRAX   Take 1 tablet (400 mg) by mouth 2 times daily Viral Prophylaxis.                                amLODIPine 5 MG tablet   Commonly known as:  NORVASC   TAKE 1 TABLET BY MOUTH DAILY                                * B-D U/F 31G X 8 MM   Generic drug:  insulin pen needle                                * B-D U/F 31G X 8 MM   USE 5 PEN NEEDLES PER DAY OR AS DIRECTED   Generic drug:  insulin pen needle                                * cyclophosphamide 50 MG capsule CHEMO   Commonly known as:  CYTOXAN   Take 6 capsules (300 mg) by mouth once a week                                * cyclophosphamide 50 MG capsule CHEMO   Commonly known as:  CYTOXAN   Take 6 capsules (300 mg) by mouth once a week                                 * cyclophosphamide 50 MG capsule CHEMO   Commonly known as:  CYTOXAN   Take 6 capsules (300 mg) by mouth once a week                                dexamethasone 4 MG tablet   Commonly known as:  DECADRON   Take 10 tablets (40 mg) by mouth every 7 days for 4 doses Take daily on Days 1, 8, 15, and 22.                                ferrous sulfate 325 (65 FE) MG tablet   Commonly known as:  IRON   Take 325 mg by mouth daily (with breakfast)                                FREESTYLE LITE test strip   USE 1 STRIP TO TEST TWICE DAILY.   Generic drug:  blood glucose monitoring                                furosemide 20 MG tablet   Commonly known as:  LASIX   Take 1 tablet (20 mg) by mouth daily                                gemfibrozil 600 MG tablet   Commonly known as:  LOPID   TAKE 1 TABLET BY MOUTH TWICE DAILY                                glipiZIDE 10 MG tablet   Commonly known as:  GLUCOTROL   TAKE 1 TABLET BY MOUTH TWICE DAILY BEFORE A MEAL                                insulin aspart 100 UNIT/ML injection   Commonly known as:  NovoLOG PEN   Inject 11 units before breakfast, 10 units before lunch, 9 units before dinner, and 5 units at bedtime. Before meal correction: 140-175 add 1 unit  176-210 add 2 units  211-245 add 3 units  246-280 add 4 units  281-315 add 5 units 316-350 add 6 units  351-385 add 7 units 386-420 add 8 units  Over 420 add 9 units                                insulin detemir 100 UNIT/ML injection   Commonly known as:  LEVEMIR FLEXPEN/FLEXTOUCH   Inject 14 Units Subcutaneous every morning (before breakfast) On Wednesday and Thursday morning, inject 16 units.                                levothyroxine 25 MCG tablet   Commonly known as:  SYNTHROID/LEVOTHROID   TAKE 1 TABLET BY MOUTH EVERY DAY.                                * lisinopril 30 MG tablet   Commonly known as:  PRINIVIL,ZESTRIL   TAKE 1 TABLET BY MOUTH EVERY DAY                                * lisinopril 30 MG tablet    Commonly known as:  PRINIVIL,ZESTRIL   TAKE 1 TABLET BY MOUTH EVERY DAY                                metoprolol 25 MG tablet   Commonly known as:  LOPRESSOR   TAKE 1 TABLET BY MOUTH TWICE DAILY                                omeprazole 20 MG CR capsule   Commonly known as:  priLOSEC   TAKE 1 CAPSULE BY MOUTH EVERY DAY                                ondansetron 8 MG tablet   Commonly known as:  ZOFRAN   Take 1 tablet (8 mg) by mouth every 8 hours as needed (Nausea/Vomiting)                                polyethylene glycol powder   Commonly known as:  MIRALAX/GLYCOLAX                                prochlorperazine 5 MG tablet   Commonly known as:  COMPAZINE   Take 1 tablet (5 mg) by mouth every 6 hours as needed (Nausea/Vomiting)                                Selenium 200 MCG Tabs   Take 100 mcg by mouth daily. Pt takes one half tab of the 200 mcg daily                                tamsulosin 0.4 MG capsule   Commonly known as:  FLOMAX   TAKE 1 CAPSULE BY MOUTH DAILY                                VITAMIN C PO   Take 1,000 mg by mouth daily                                VITAMIN D3 PO   Take 1,000 Units by mouth daily                                * Notice:  This list has 7 medication(s) that are the same as other medications prescribed for you. Read the directions carefully, and ask your doctor or other care provider to review them with you.

## 2017-10-25 NOTE — ANESTHESIA CARE TRANSFER NOTE
Patient: Roc Parkinson    Procedure(s):  LEFT EYE PHACOEMULSIFICATION CLEAR CORNEA WITH STANDARD INTRAOCULAR LENS IMPLANT  - Wound Class: I-Clean    Diagnosis: CATARACT  Diagnosis Additional Information: No value filed.    Anesthesia Type:   MAC     Note:  Airway :Room Air  Patient transferred to:PACU  Comments: Awake, alert, VSS, report to RN, monitors and alarms on, IV patent.  Handoff Report: Identifed the Patient, Identified the Reponsible Provider, Reviewed the pertinent medical history, Discussed the surgical course, Reviewed Intra-OP anesthesia mangement and issues during anesthesia, Set expectations for post-procedure period and Allowed opportunity for questions and acknowledgement of understanding      Vitals: (Last set prior to Anesthesia Care Transfer)    CRNA VITALS  10/25/2017 0947 - 10/25/2017 1022      10/25/2017             NIBP: 147/64    Pulse: 71    NIBP Mean: 117    Ht Rate: 70    SpO2: 98 %    Resp Rate (set): 10                Electronically Signed By: ALEXANDER Dominguez CRNA  October 25, 2017  10:22 AM

## 2017-10-25 NOTE — DISCHARGE INSTRUCTIONS
[facility] Anesthesia [facility] Care Center Discharge  Instructions  Anesthesia (Eye Care Center)   Adult Discharge Instructions    For 24 hours after surgery    1. Get plenty of rest.  Make arrangements to have a responsible adult stay with you for at least 6 hours after you leave the hospital.  2. Do not drive or use heavy equipment for 24 hours.    3. Do not drink alcohol for 24 hours.  4. Do not sign legal documents or make important decisions for 24 hours.  5. Avoid strenuous or risky activities. You may feel lightheaded.  If so, sit for a few minutes before standing.  Have someone help you get up.   6. Conscious sedation patients may resume a regular diet..  Any questions of medical nature, call your physician.    POST-OPERATIVE CARE FOLLOWING CATARACT SURGERY    DR. ELIAZAR SEGUNDO  [facility]  [phone]    Postoperative medications: After surgery, you will use several different eye drop medications. You may have either the brand specific form or generic of each type used.     Begin your eye drops today as directed.  You should instill the drop, close the eye without blinking and keep closed for 3 minutes allowing the drop to absorb.  It is fine to instill all 3 of the drops consecutively, waiting the 3 minutes in between each one. Place the shield for sleep.  In the morning, instill 1 drop of all 3 eye drops before your post-op appointment.      Antibiotic  Moxeza - is an antibiotic drop that is used to minimize the risk of infection. Instill your first drop at bedtime tonight, then 2 times daily for one week.       -OR-    Ofloxacin - this generic antibiotic is to be used 4 times a day for one week, you can start using this drop after you get home, instilling 3 separate times on the day of surgery and then 4 times a day there after.     Anti-inflammatory   Prolensa - use it once daily until gone, beginning today.    -OR-    Ketorolac -this generic drop should be used 4 times daily until gone. You  can start your drops when you get home, instilling 3 separate times on the day of surgery.     Steroid  Durezol - is a steroid drop used to minimize inflammation and modulate healing.  It should be used 2 times daily until gone, beginning with your first drop at bedtime tonight.    -OR-    Prednisolone - the generic form should be used 4 times daily for 3 weeks or until gone. You can start your first drop after you get home, instilling 3 separate times on the day of surgery.      Do not rub the operated eye. Wear the eye shield during sleeping hours for one week.    Light sensitivity may be noticed. Sunglasses may be worn for comfort.    Some discomfort and irritation may be noticed. Acetaminophen (Tylenol) or Ibuprofen (Advil) may be taken for discomfort.    Avoid bending over, strenuous activity or heavy lifting for one week.    Keep the operated eye dry.    You may wash your hair, bathe or shower, but keep the operated eye closed while doing so.    Use medication exactly as prescribed by your doctor.  You may restart your regular home medications.    Bring all materials and medications to the clinic on your first post-operative visit.    Call the doctor s office if any of the following should occur:  -  any sudden vision change  -  nausea or severe headache  -  increase in pain not controlled  -  increased amount of floaters (black spots in front of vision)         -  or signs of infection (pus, increasing redness or tenderness)            7. Revised 12/10/2015

## 2017-11-01 NOTE — IP AVS SNAPSHOT
[facility]    [address]    [place] [address]    Phone:  [phone]    Fax:  [phone]                                       After Visit Summary   11/1/2017    Roc Parkinson    MRN: [id number]           After Visit Summary Signature Page     I have received my discharge instructions, and my questions have been answered. I have discussed any challenges I see with this plan with the nurse or doctor.    ..........................................................................................................................................  Patient/Patient Representative Signature      ..........................................................................................................................................  Patient Representative Print Name and Relationship to Patient    ..................................................               ................................................  Date                                            Time    ..........................................................................................................................................  Reviewed by Signature/Title    ...................................................              ..............................................  Date                                                            Time

## 2017-11-01 NOTE — IP AVS SNAPSHOT
MRN:[id number]                      After Visit Summary   11/1/2017    Roc Parkinson    MRN: [id number]           Thank you!     Thank you for choosing [facility] for your care. Our goal is always to provide you with excellent care. Hearing back from our patients is one way we can continue to improve our services. Please take a few minutes to complete the written survey that you may receive in the mail after you visit with us. Thank you!        Patient Information     Date Of Birth          7/7/1926        About your hospital stay     You were admitted on:  November 1, 2017 You last received care in the:  [facility]    You were discharged on:  November 1, 2017       Who to Call     For medical emergencies, please call 911.  For non-urgent questions about your medical care, please call your primary care provider or clinic, [phone]  For questions related to your surgery, please call your surgery clinic        Attending Provider     Provider Specialty    Shady Haider MD Ophthalmology       Primary Care Provider Office Phone # Fax #    Dickson Reich [phone]      Your next 10 appointments already scheduled     Nov 07, 2017  9:30 AM CST   Diabetic Education with  DIABETIC ED RESOURCE   [facility] ([facility])    [address] [place] [id number] [phone]            Nov 08, 2017 11:30 AM CST   Level 1 with  INFUSION CHAIR 17   [facility] ([facility])    [facility]  [address]  [facility] [address]   [phone]            Nov 29, 2017 11:30 AM CST   Level 1 with  INFUSION CHAIR 13   [facility] ([facility])    [facility]  [address]  [facility] [address]   [phone]            Dec 20, 2017 11:00 AM CST    Level 1 with  INFUSION CHAIR 5   [facility] ([facility])    [facility] [place] [address]  [place] [address]   [phone]            Dec 20, 2017 11:30 AM CST   Return Visit with Gretel Quinn MD   [facility] ([facility])    [facility] [place] [address]  [place] [address]   [phone]              Further instructions from your care team           POST-OPERATIVE CARE FOLLOWING CATARACT SURGERY    DR. ELIAZAR SEGUNDO  [facility]  [phone]    Postoperative medications: After surgery, you will use several different eye drop medications. You may have either the brand specific form or generic of each type used.     Begin your eye drops today as directed.  You should instill the drop, close the eye without blinking and keep closed for 3 minutes allowing the drop to absorb.  It is fine to instill all 3 of the drops consecutively, waiting the 3 minutes in between each one. Place the shield for sleep.  In the morning, instill 1 drop of all 3 eye drops before your post-op appointment.      Antibiotic  Moxeza - is an antibiotic drop that is used to minimize the risk of infection. Instill your first drop at bedtime tonight, then 2 times daily for one week.       -OR-    Ofloxacin - this generic antibiotic is to be used 4 times a day for one week, you can start using this drop after you get home, instilling 3 separate times on the day of surgery and then 4 times a day there after.     Anti-inflammatory   Prolensa - use it once daily until gone, beginning today.    -OR-    Ketorolac -this generic drop should be used 4 times daily until gone. You can start your drops when you get home, instilling 3 separate times on the day of surgery.     Steroid  Durezol - is a steroid drop used to minimize inflammation and modulate healing.  It should be used 2 times daily until gone,  beginning with your first drop at bedtime tonight.    -OR-    Prednisolone - the generic form should be used 4 times daily for 3 weeks or until gone. You can start your first drop after you get home, instilling 3 separate times on the day of surgery.        Do not rub the operated eye. Wear the eye shield during sleeping hours for one week.      Light sensitivity may be noticed. Sunglasses may be worn for comfort.      Some discomfort and irritation may be noticed. Acetaminophen (Tylenol) or Ibuprofen (Advil) may be taken for discomfort.      Avoid bending over, strenuous activity or heavy lifting for one week.      Keep the operated eye dry.      You may wash your hair, bathe or shower, but keep the operated eye closed while doing so.      Use medication exactly as prescribed by your doctor.  You may restart your regular home medications.      Bring all materials and medications to the clinic on your first post-operative visit.      Call the doctor s office if any of the following should occur:  -  any sudden vision change  -  nausea or severe headache  -  increase in pain not controlled  -  increased amount of floaters (black spots in front of vision)         -  or signs of infection (pus, increasing redness or tenderness)  Revised 12/10/2015      [facility] Discharge  Instructions  Anesthesia (Eye Care Center)   Adult Discharge Instructions    For 24 hours after surgery    1. Get plenty of rest.  Make arrangements to have a responsible adult stay with you for at least 6 hours after you leave the hospital.  2. Do not drive or use heavy equipment for 24 hours.    3. Do not drink alcohol for 24 hours.  4. Do not sign legal documents or make important decisions for 24 hours.  5. Avoid strenuous or risky activities. You may feel lightheaded.  If so, sit for a few minutes before standing.  Have someone help you get up.   6. Conscious sedation patients may resume a regular  "diet..  7. Any questions of medical nature, call your physician.        Pending Results     No orders found from 10/30/2017 to 2017.            Admission Information     Date & Time Provider Department Dept. Phone    2017 Shady Haider MD [facility] [phone]      Your Vitals Were     Blood Pressure Temperature Respirations Weight Pulse Oximetry BMI (Body Mass Index)    148/69 97.9  F (36.6  C) (Temporal) 16 94.8 kg (209 lb) 94% 29.99 kg/m2      [employer] Information     [email or web address] lets you send messages to your doctor, view your test results, renew your prescriptions, schedule appointments and more. To sign up, go to [email or web address] . Click on \"Log in\" on the left side of the screen, which will take you to the Welcome page. Then click on \"Sign up Now\" on the right side of the page.     You will be asked to enter the access code listed below, as well as some personal information. Please follow the directions to create your username and password.     Your access code is: [email or web address]  Expires: 2017 10:12 AM     Your access code will  in 90 days. If you need help or a new code, please call your [place] clinic or [phone].        Care EveryWhere ID     This is your Care EveryWhere ID. This could be used by other organizations to access your [place] medical records  [id number]        Equal Access to Services     [facility] KALIN : Hadii virginia griffino Somaeali, waaxda luqadaha, qaybta kaalmada paddy, melita shaw . So [facility] [phone].    ATENCIÓN: Si habla español, tiene a nolan disposición servicios gratuitos de asistencia lingüística. Llame al [phone].    We comply with applicable federal civil rights laws and [place] laws. We do not discriminate on the basis of race, color, national origin, age, disability, sex, sexual orientation, or gender identity.               Review of your medicines      UNREVIEWED medicines. Ask your " doctor about these medicines        Dose / Directions    acyclovir 400 MG tablet   Commonly known as:  ZOVIRAX   Used for:  Multiple myeloma not having achieved remission (H)        Dose:  400 mg   Take 1 tablet (400 mg) by mouth 2 times daily Viral Prophylaxis.   Quantity:  60 tablet   Refills:  3       amLODIPine 5 MG tablet   Commonly known as:  NORVASC   Used for:  Essential hypertension, benign        TAKE 1 TABLET BY MOUTH DAILY   Quantity:  30 tablet   Refills:  5       * cyclophosphamide 50 MG capsule CHEMO   Commonly known as:  CYTOXAN   Used for:  Multiple myeloma not having achieved remission (H)        Dose:  300 mg   Take 6 capsules (300 mg) by mouth once a week   Quantity:  24 capsule   Refills:  0       * cyclophosphamide 50 MG capsule CHEMO   Commonly known as:  CYTOXAN   Used for:  Multiple myeloma not having achieved remission (H)        Dose:  300 mg   Take 6 capsules (300 mg) by mouth once a week   Quantity:  24 capsule   Refills:  0       * cyclophosphamide 50 MG capsule CHEMO   Commonly known as:  CYTOXAN   Used for:  Multiple myeloma not having achieved remission (H)        Dose:  300 mg   Take 6 capsules (300 mg) by mouth once a week   Quantity:  24 capsule   Refills:  0       dexamethasone 4 MG tablet   Commonly known as:  DECADRON   Used for:  Multiple myeloma not having achieved remission (H)        Dose:  40 mg   Take 10 tablets (40 mg) by mouth every 7 days for 4 doses Take daily on Days 1, 8, 15, and 22.   Quantity:  40 tablet   Refills:  0       ferrous sulfate 325 (65 FE) MG tablet   Commonly known as:  IRON        Dose:  325 mg   Take 325 mg by mouth daily (with breakfast)   Refills:  0       furosemide 20 MG tablet   Commonly known as:  LASIX   Used for:  Multiple myeloma not having achieved remission (H)        Dose:  20 mg   Take 1 tablet (20 mg) by mouth daily   Quantity:  30 tablet   Refills:  3       gemfibrozil 600 MG tablet   Commonly known as:  LOPID   Used for:   Hyperlipidemia        TAKE 1 TABLET BY MOUTH TWICE DAILY   Quantity:  180 tablet   Refills:  1       glipiZIDE 10 MG tablet   Commonly known as:  GLUCOTROL   Used for:  Type 2 diabetes mellitus with stage 3 chronic kidney disease, with long-term current use of insulin (H)        TAKE 1 TABLET BY MOUTH TWICE DAILY BEFORE A MEAL   Quantity:  180 tablet   Refills:  1       insulin aspart 100 UNIT/ML injection   Commonly known as:  NovoLOG PEN   Used for:  Uncontrolled type 2 diabetes mellitus with hyperglycemia, unspecified long term insulin use status (H)        Inject 11 units before breakfast, 10 units before lunch, 9 units before dinner, and 5 units at bedtime. Before meal correction: 140-175 add 1 unit  176-210 add 2 units  211-245 add 3 units  246-280 add 4 units  281-315 add 5 units 316-350 add 6 units  351-385 add 7 units 386-420 add 8 units  Over 420 add 9 units   Quantity:  12 mL   Refills:  3       insulin detemir 100 UNIT/ML injection   Commonly known as:  LEVEMIR FLEXPEN/FLEXTOUCH   Used for:  Type 2 diabetes mellitus with diabetic chronic kidney disease, unspecified CKD stage, unspecified long term insulin use status (H)        Dose:  14 Units   Inject 14 Units Subcutaneous every morning (before breakfast) On Wednesday and Thursday morning, inject 16 units.   Quantity:  15 mL   Refills:  1       levothyroxine 25 MCG tablet   Commonly known as:  SYNTHROID/LEVOTHROID   Used for:  Acquired hypothyroidism        TAKE 1 TABLET BY MOUTH EVERY DAY.   Quantity:  90 tablet   Refills:  0       * lisinopril 30 MG tablet   Commonly known as:  PRINIVIL,ZESTRIL   Used for:  Essential hypertension, benign        TAKE 1 TABLET BY MOUTH EVERY DAY   Quantity:  90 tablet   Refills:  3       * lisinopril 30 MG tablet   Commonly known as:  PRINIVIL,ZESTRIL   Used for:  Essential hypertension, benign        TAKE 1 TABLET BY MOUTH EVERY DAY   Quantity:  90 tablet   Refills:  0       metoprolol 25 MG tablet   Commonly known as:   LOPRESSOR   Used for:  Essential hypertension        TAKE 1 TABLET BY MOUTH TWICE DAILY   Quantity:  180 tablet   Refills:  1       omeprazole 20 MG CR capsule   Commonly known as:  priLOSEC   Used for:  Congenital hiatus hernia        TAKE 1 CAPSULE BY MOUTH EVERY DAY   Quantity:  90 capsule   Refills:  3       ondansetron 8 MG tablet   Commonly known as:  ZOFRAN   Used for:  Multiple myeloma not having achieved remission (H)        Dose:  8 mg   Take 1 tablet (8 mg) by mouth every 8 hours as needed (Nausea/Vomiting)   Quantity:  10 tablet   Refills:  2       polyethylene glycol powder   Commonly known as:  MIRALAX/GLYCOLAX        Refills:  0       prochlorperazine 5 MG tablet   Commonly known as:  COMPAZINE   Used for:  Multiple myeloma not having achieved remission (H)        Dose:  5 mg   Take 1 tablet (5 mg) by mouth every 6 hours as needed (Nausea/Vomiting)   Quantity:  30 tablet   Refills:  2       Selenium 200 MCG Tabs        Dose:  100 mcg   Take 100 mcg by mouth daily. Pt takes one half tab of the 200 mcg daily   Refills:  0       tamsulosin 0.4 MG capsule   Commonly known as:  FLOMAX   Used for:  Malignant neoplasm of prostate (H)        TAKE 1 CAPSULE BY MOUTH DAILY   Quantity:  90 capsule   Refills:  2       VITAMIN C PO        Dose:  1000 mg   Take 1,000 mg by mouth daily   Refills:  0       VITAMIN D3 PO        Dose:  1000 Units   Take 1,000 Units by mouth daily   Refills:  0       * Notice:  This list has 5 medication(s) that are the same as other medications prescribed for you. Read the directions carefully, and ask your doctor or other care provider to review them with you.      CONTINUE these medicines which have NOT CHANGED        Dose / Directions    * B-D U/F 31G X 8 MM   Generic drug:  insulin pen needle        Refills:  0       * B-D U/F 31G X 8 MM   Used for:  Type 2 diabetes mellitus with stage 3 chronic kidney disease, with long-term current use of insulin (H)   Generic drug:  insulin pen  needle        USE 5 PEN NEEDLES PER DAY OR AS DIRECTED   Quantity:  500 each   Refills:  1       FREESTYLE LITE test strip   Used for:  Uncontrolled type 1 diabetes mellitus with stage 3 chronic kidney disease (H)   Generic drug:  blood glucose monitoring        USE 1 STRIP TO TEST TWICE DAILY.   Quantity:  200 strip   Refills:  1       * Notice:  This list has 2 medication(s) that are the same as other medications prescribed for you. Read the directions carefully, and ask your doctor or other care provider to review them with you.             Protect others around you: Learn how to safely use, store and throw away your medicines at www.disposemymeds.org.             Medication List: This is a list of all your medications and when to take them. Check marks below indicate your daily home schedule. Keep this list as a reference.      Medications           Morning Afternoon Evening Bedtime As Needed    acyclovir 400 MG tablet   Commonly known as:  ZOVIRAX   Take 1 tablet (400 mg) by mouth 2 times daily Viral Prophylaxis.                                amLODIPine 5 MG tablet   Commonly known as:  NORVASC   TAKE 1 TABLET BY MOUTH DAILY                                * B-D U/F 31G X 8 MM   Generic drug:  insulin pen needle                                * B-D U/F 31G X 8 MM   USE 5 PEN NEEDLES PER DAY OR AS DIRECTED   Generic drug:  insulin pen needle                                * cyclophosphamide 50 MG capsule CHEMO   Commonly known as:  CYTOXAN   Take 6 capsules (300 mg) by mouth once a week                                * cyclophosphamide 50 MG capsule CHEMO   Commonly known as:  CYTOXAN   Take 6 capsules (300 mg) by mouth once a week                                * cyclophosphamide 50 MG capsule CHEMO   Commonly known as:  CYTOXAN   Take 6 capsules (300 mg) by mouth once a week                                dexamethasone 4 MG tablet   Commonly known as:  DECADRON   Take 10 tablets (40 mg) by mouth every 7 days  for 4 doses Take daily on Days 1, 8, 15, and 22.                                ferrous sulfate 325 (65 FE) MG tablet   Commonly known as:  IRON   Take 325 mg by mouth daily (with breakfast)                                FREESTYLE LITE test strip   USE 1 STRIP TO TEST TWICE DAILY.   Generic drug:  blood glucose monitoring                                furosemide 20 MG tablet   Commonly known as:  LASIX   Take 1 tablet (20 mg) by mouth daily                                gemfibrozil 600 MG tablet   Commonly known as:  LOPID   TAKE 1 TABLET BY MOUTH TWICE DAILY                                glipiZIDE 10 MG tablet   Commonly known as:  GLUCOTROL   TAKE 1 TABLET BY MOUTH TWICE DAILY BEFORE A MEAL                                insulin aspart 100 UNIT/ML injection   Commonly known as:  NovoLOG PEN   Inject 11 units before breakfast, 10 units before lunch, 9 units before dinner, and 5 units at bedtime. Before meal correction: 140-175 add 1 unit  176-210 add 2 units  211-245 add 3 units  246-280 add 4 units  281-315 add 5 units 316-350 add 6 units  351-385 add 7 units 386-420 add 8 units  Over 420 add 9 units                                insulin detemir 100 UNIT/ML injection   Commonly known as:  LEVEMIR FLEXPEN/FLEXTOUCH   Inject 14 Units Subcutaneous every morning (before breakfast) On Wednesday and Thursday morning, inject 16 units.                                levothyroxine 25 MCG tablet   Commonly known as:  SYNTHROID/LEVOTHROID   TAKE 1 TABLET BY MOUTH EVERY DAY.                                * lisinopril 30 MG tablet   Commonly known as:  PRINIVIL,ZESTRIL   TAKE 1 TABLET BY MOUTH EVERY DAY                                * lisinopril 30 MG tablet   Commonly known as:  PRINIVIL,ZESTRIL   TAKE 1 TABLET BY MOUTH EVERY DAY                                metoprolol 25 MG tablet   Commonly known as:  LOPRESSOR   TAKE 1 TABLET BY MOUTH TWICE DAILY                                omeprazole 20 MG CR capsule    Commonly known as:  priLOSEC   TAKE 1 CAPSULE BY MOUTH EVERY DAY                                ondansetron 8 MG tablet   Commonly known as:  ZOFRAN   Take 1 tablet (8 mg) by mouth every 8 hours as needed (Nausea/Vomiting)                                polyethylene glycol powder   Commonly known as:  MIRALAX/GLYCOLAX                                prochlorperazine 5 MG tablet   Commonly known as:  COMPAZINE   Take 1 tablet (5 mg) by mouth every 6 hours as needed (Nausea/Vomiting)                                Selenium 200 MCG Tabs   Take 100 mcg by mouth daily. Pt takes one half tab of the 200 mcg daily                                tamsulosin 0.4 MG capsule   Commonly known as:  FLOMAX   TAKE 1 CAPSULE BY MOUTH DAILY                                VITAMIN C PO   Take 1,000 mg by mouth daily                                VITAMIN D3 PO   Take 1,000 Units by mouth daily                                * Notice:  This list has 7 medication(s) that are the same as other medications prescribed for you. Read the directions carefully, and ask your doctor or other care provider to review them with you.

## 2017-11-01 NOTE — ANESTHESIA CARE TRANSFER NOTE
Patient: Roc Parkinson    Procedure(s):  RIGHT EYE PHACOEMULSIFICATION CLEAR CORNEA WITH STANDARD INTRAOCULAR LENS IMPLANT - Wound Class: I-Clean    Diagnosis: CATARACT  Diagnosis Additional Information: No value filed.    Anesthesia Type:   MAC     Note:  Airway :Room Air  Patient transferred to:PACU  Comments: Pt to eye center recovery. VSS. Report complete to RN.Handoff Report: Identifed the Patient, Identified the Reponsible Provider, Reviewed the pertinent medical history, Discussed the surgical course, Reviewed Intra-OP anesthesia mangement and issues during anesthesia, Set expectations for post-procedure period and Allowed opportunity for questions and acknowledgement of understanding      Vitals: (Last set prior to Anesthesia Care Transfer)    CRNA VITALS  11/1/2017 0719 - 11/1/2017 0754      11/1/2017             Resp Rate (set): 10                Electronically Signed By: Ebony Mcarthur CRNA, APRN CRNA  November 1, 2017  7:54 AM

## 2017-11-01 NOTE — OP NOTE
[facility]  Ophthalmology Operative Note    PREOPERATIVE DIAGNOSIS:  Dense cataract of the Right eye.       POSTOPERATIVE DIAGNOSIS:  Dense cataract of the Right eye.       OPERATION:  Clear corneal phacoemulsification with intraocular lens implant of the Right eye.       PROCEDURE:  Roc Parkinson was brought into the operating room with a topical anesthetic.  Under the microscope after a sterile ophthalmic prep, a lid speculum was put into place.  Anterior chamber was entered with a #75 blade.  Anterior chamber was then filled with lidocaine solution and a viscoelastic material.  A keratome blade was then used to fashion a 2.6 mm temporal incision in the corneoscleral junction and anterior capsule of the lens was opened using a circular capsulorrhexis.  The lens was carefully hydrodissected.  The phacoemulsification tip was then introduced into the eye and a central groove fashioned.  The nucleus was split in half and then quartered, and nuclear material was aspirated.  The irrigation-aspiration apparatus was then utilized to clear the remaining cortical material.  The posterior capsule was polished and a foldable posterior chamber intraocular lens was then introduced into the capsular bag, unfolded, and rotated into the horizontal position.  No sutures were necessary to close the incision site.  The viscoelastic material was aspirated.  Drops of Moxeza and Prolensa were used on the surface of the eye and a clear shield was put over the eye before the patient was dismissed from the operating room.  The patient tolerated the procedure without difficulty.       Implant Name Type Inv. Item Serial No.  Lot No. LRB No. Used   EYE IMP IOL FELICITA PCL TECNIS [id number] 20.0 Lens/Eye Implant EYE IMP IOL FELICITA PCL TECNIS ZCB00 20.0 [id number] [employer]   Right 1           ELIAZAR SEGUNDO MD

## 2017-11-01 NOTE — ANESTHESIA POSTPROCEDURE EVALUATION
Patient: Roc Parkinson    Procedure(s):  RIGHT EYE PHACOEMULSIFICATION CLEAR CORNEA WITH STANDARD INTRAOCULAR LENS IMPLANT - Wound Class: I-Clean    Diagnosis:CATARACT  Diagnosis Additional Information: No value filed.    Anesthesia Type:  MAC    Note:  Anesthesia Post Evaluation    Patient location during evaluation: PACU  Patient participation: Able to fully participate in evaluation  Level of consciousness: awake  Pain management: adequate  Airway patency: patent  Cardiovascular status: acceptable  Respiratory status: acceptable  Hydration status: acceptable  PONV: none     Anesthetic complications: None          Last vitals:  Vitals:    11/01/17 0623 11/01/17 0755   BP: 150/71 148/69   Resp: 16 16   Temp: 36.6  C (97.9  F)    SpO2: 97% 94%         Electronically Signed By: Peggy Barrett MD  November 1, 2017  8:20 AM

## 2017-11-01 NOTE — ANESTHESIA PREPROCEDURE EVALUATION
Anesthesia Evaluation     . Pt has had prior anesthetic.     No history of anesthetic complications          ROS/MED HX    ENT/Pulmonary:      (-) tobacco use and sleep apnea   Neurologic:      (-) seizures and CVA   Cardiovascular:     (+) Dyslipidemia, hypertension----. : . . . :. dysrhythmias a-fib, .       METS/Exercise Tolerance:     Hematologic:         Musculoskeletal:   (+) arthritis, , , -       GI/Hepatic:        (-) GERD and liver disease   Renal/Genitourinary:     (+) chronic renal disease, type: CRI,       Endo:     (+) type I DM, thyroid problem hypothyroidism, Obesity, .      Psychiatric:         Infectious Disease:         Malignancy:   (+) Malignancy History of Prostate, Other and Skin  Other CA bladder status post         Other:                     Physical Exam  Normal systems: cardiovascular and pulmonary    Airway   Mallampati: III  TM distance: >3 FB  Neck ROM: full    Dental   (+) chipped    Cardiovascular       Pulmonary                         Anesthesia Plan      History & Physical Review  History and physical reviewed and following examination; no interval change.    ASA Status:  3 .        Plan for MAC Reason for MAC:  Procedure to face, neck, head or breast  PONV prophylaxis:  Ondansetron (or other 5HT-3)  precedex no versed last time, limit sedation      Postoperative Care      Consents  Anesthetic plan, risks, benefits and alternatives discussed with:  Patient..        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     Past Surgical History:   Procedure Laterality Date     ARTHROPLASTY KNEE  11/5/2012    Procedure:  ARTHROPLASTY KNEE;  RIGHT TOTAL KNEE ARTHROPLASTY (SMITH & NEPHEW)^;  Surgeon: Dickson Schulte MD;  Location:  OR     BIOPSY      bladder     COLONOSCOPY  2007     COLONOSCOPY N/A 11/15/2016    Procedure: COMBINED COLONOSCOPY, SINGLE OR MULTIPLE BIOPSY/POLYPECTOMY BY BIOPSY;  Surgeon: Kenneth Fulton MD;  Location:  GI     GENITOURINARY SURGERY      TURP x2 and bladder scraping     GI SURGERY      anal fistula     ORTHOPEDIC SURGERY      lt knee in nov.2009     PHACOEMULSIFICATION CLEAR CORNEA WITH STANDARD INTRAOCULAR LENS IMPLANT Left 10/25/2017    Procedure: PHACOEMULSIFICATION CLEAR CORNEA WITH STANDARD INTRAOCULAR LENS IMPLANT;  LEFT EYE PHACOEMULSIFICATION CLEAR CORNEA WITH STANDARD INTRAOCULAR LENS IMPLANT ;  Surgeon: Shady Haider MD;  Location:  EC     SOFT TISSUE SURGERY      melanoma     Prior to Admission medications    Medication Sig Start Date End Date Taking? Authorizing Provider   lisinopril (PRINIVIL,ZESTRIL) 30 MG tablet TAKE 1 TABLET BY MOUTH EVERY DAY 10/17/17  Yes Dickson Reich MD   dexamethasone (DECADRON) 4 MG tablet Take 10 tablets (40 mg) by mouth every 7 days for 4 doses Take daily on Days 1, 8, 15, and 22. 10/17/17 11/8/17 Yes Gretel Quinn MD   glipiZIDE (GLUCOTROL) 10 MG tablet TAKE 1 TABLET BY MOUTH TWICE DAILY BEFORE A MEAL 10/16/17  Yes Dickson Reich MD   insulin aspart (NOVOLOG PEN) 100 UNIT/ML injection Inject 11 units before breakfast, 10 units before lunch, 9 units before dinner, and 5 units at bedtime.  Before meal correction:  140-175 add 1 unit   176-210 add 2 units   211-245 add 3 units   246-280 add 4 units   281-315 add 5 units  316-350 add 6 units   351-385 add 7 units  386-420 add 8 units   Over 420 add 9 units     10/11/17  Yes Dickson Reich MD   tamsulosin (FLOMAX) 0.4 MG capsule TAKE 1 CAPSULE BY MOUTH DAILY 10/2/17  Yes Dickson Reich MD   insulin detemir (LEVEMIR FLEXPEN/FLEXTOUCH) 100 UNIT/ML injection Inject  14 Units Subcutaneous every morning (before breakfast) On Wednesday and Thursday morning, inject 16 units. 9/26/17  Yes Dickson Reich MD   furosemide (LASIX) 20 MG tablet Take 1 tablet (20 mg) by mouth daily 9/6/17  Yes Gretel Quinn MD   levothyroxine (SYNTHROID/LEVOTHROID) 25 MCG tablet TAKE 1 TABLET BY MOUTH EVERY DAY. 8/8/17  Yes Dickson Reich MD   metoprolol (LOPRESSOR) 25 MG tablet TAKE 1 TABLET BY MOUTH TWICE DAILY 8/1/17  Yes Dickson Reich MD   cyclophosphamide (CYTOXAN) 50 MG capsule CHEMO Take 6 capsules (300 mg) by mouth once a week 7/27/17  Yes Gretel Quinn MD   amLODIPine (NORVASC) 5 MG tablet TAKE 1 TABLET BY MOUTH DAILY 7/12/17  Yes Dickson Reich MD   gemfibrozil (LOPID) 600 MG tablet TAKE 1 TABLET BY MOUTH TWICE DAILY 6/30/17  Yes Dickson Reich MD   omeprazole (PRILOSEC) 20 MG CR capsule TAKE 1 CAPSULE BY MOUTH EVERY DAY 3/13/17  Yes Dickson Reich MD   polyethylene glycol (MIRALAX/GLYCOLAX) powder  11/20/16  Yes Reported, Patient   Ascorbic Acid (VITAMIN C PO) Take 1,000 mg by mouth daily   Yes Unknown, Entered By History   Cholecalciferol (VITAMIN D3 PO) Take 1,000 Units by mouth daily   Yes Unknown, Entered By History   ferrous sulfate (IRON) 325 (65 FE) MG tablet Take 325 mg by mouth daily (with breakfast)   Yes Unknown, Entered By History   lisinopril (PRINIVIL,ZESTRIL) 30 MG tablet TAKE 1 TABLET BY MOUTH EVERY DAY 10/17/16  Yes Dickson Reich MD   Selenium 200 MCG TABS Take 100 mcg by mouth daily. Pt takes one half tab of the 200 mcg daily    Yes Reported, Patient   cyclophosphamide (CYTOXAN) 50 MG capsule CHEMO Take 6 capsules (300 mg) by mouth once a week 10/17/17   Gretel Quinn MD B-D U/F 31G X 8 MM insulin pen needle USE 5 PEN NEEDLES PER DAY OR AS DIRECTED 9/18/17   Rachana, Dickson Clark, MD   FREESTYLE LITE test strip USE 1 STRIP TO TEST TWICE DAILY.  Patient not taking: Reported on 10/24/2017 8/21/17   Dickson Reich  MD Clark   cyclophosphamide (CYTOXAN) 50 MG capsule CHEMO Take 6 capsules (300 mg) by mouth once a week 8/16/17   Gretel Quinn MD   B-D U/F 31G X 8 MM insulin pen needle  1/31/17   Reported, Patient   acyclovir (ZOVIRAX) 400 MG tablet Take 1 tablet (400 mg) by mouth 2 times daily Viral Prophylaxis. 1/4/17   Gretel Quinn MD   prochlorperazine (COMPAZINE) 5 MG tablet Take 1 tablet (5 mg) by mouth every 6 hours as needed (Nausea/Vomiting) 12/15/16   Rosalva Heath MD   ondansetron (ZOFRAN) 8 MG tablet Take 1 tablet (8 mg) by mouth every 8 hours as needed (Nausea/Vomiting) 12/15/16   Rosalva Heath MD     Current Facility-Administered Medications Ordered in Epic   Medication Dose Route Frequency Last Rate Last Dose     proparacaine (ALCAINE) 0.5 % ophthalmic solution 1 drop  1 drop Ophthalmic Once         lidocaine (AKTEN) ophthalmic gel 0.5 mL  0.5 mL Ophthalmic Once         povidone-iodine 5 % ophthalmic solution 1 drop  1 drop Ophthalmic Once         lidocaine 1 % 1 mL  1 mL Other Q1H PRN   1 mL at 11/01/17 0635     lactated ringers infusion  500 mL Intravenous Continuous 25 mL/hr at 11/01/17 0635 500 mL at 11/01/17 0635     No current [facility]-ordered outpatient prescriptions on file.     Wt Readings from Last 1 Encounters:   11/01/17 94.8 kg (209 lb)     Temp Readings from Last 1 Encounters:   11/01/17 36.6  C (97.9  F) (Temporal)     BP Readings from Last 6 Encounters:   11/01/17 150/71   10/25/17 152/69   10/24/17 137/68   10/23/17 110/54   10/18/17 147/71   10/18/17 143/65     Pulse Readings from Last 4 Encounters:   10/25/17 64   10/24/17 72   10/23/17 72   10/18/17 62     Resp Readings from Last 1 Encounters:   11/01/17 16     SpO2 Readings from Last 1 Encounters:   11/01/17 97%     Recent Labs   Lab Test  10/18/17   1050  09/27/17   0955   NA  140  138   POTASSIUM  4.9  4.7   CHLORIDE  109  108   CO2  21  20   ANIONGAP  10  10   GLC  132*  256*   BUN  34*  35*   CR  1.09  1.08   MICHELLE  9.3   9.2     Recent Labs   Lab Test  10/18/17   1050  09/27/17   0955   WBC  3.9*  3.8*   HGB  9.8*  9.4*   PLT  142*  142*     Recent Labs   Lab Test  02/13/17   1536  11/20/16   0627   INR  1.15*  1.46*      RECENT LABS:   ECG:   ECHO:   CXR:                      .

## 2017-11-01 NOTE — DISCHARGE INSTRUCTIONS
POST-OPERATIVE CARE FOLLOWING CATARACT SURGERY    DR. ELIAZAR SEGUNDO  [facility]  [phone]    Postoperative medications: After surgery, you will use several different eye drop medications. You may have either the brand specific form or generic of each type used.     Begin your eye drops today as directed.  You should instill the drop, close the eye without blinking and keep closed for 3 minutes allowing the drop to absorb.  It is fine to instill all 3 of the drops consecutively, waiting the 3 minutes in between each one. Place the shield for sleep.  In the morning, instill 1 drop of all 3 eye drops before your post-op appointment.      Antibiotic  Moxeza - is an antibiotic drop that is used to minimize the risk of infection. Instill your first drop at bedtime tonight, then 2 times daily for one week.       -OR-    Ofloxacin - this generic antibiotic is to be used 4 times a day for one week, you can start using this drop after you get home, instilling 3 separate times on the day of surgery and then 4 times a day there after.     Anti-inflammatory   Prolensa - use it once daily until gone, beginning today.    -OR-    Ketorolac -this generic drop should be used 4 times daily until gone. You can start your drops when you get home, instilling 3 separate times on the day of surgery.     Steroid  Durezol - is a steroid drop used to minimize inflammation and modulate healing.  It should be used 2 times daily until gone, beginning with your first drop at bedtime tonight.    -OR-    Prednisolone - the generic form should be used 4 times daily for 3 weeks or until gone. You can start your first drop after you get home, instilling 3 separate times on the day of surgery.        Do not rub the operated eye. Wear the eye shield during sleeping hours for one week.      Light sensitivity may be noticed. Sunglasses may be worn for comfort.      Some discomfort and irritation may be noticed. Acetaminophen (Tylenol) or  Ibuprofen (Advil) may be taken for discomfort.      Avoid bending over, strenuous activity or heavy lifting for one week.      Keep the operated eye dry.      You may wash your hair, bathe or shower, but keep the operated eye closed while doing so.      Use medication exactly as prescribed by your doctor.  You may restart your regular home medications.      Bring all materials and medications to the clinic on your first post-operative visit.      Call the doctor s office if any of the following should occur:  -  any sudden vision change  -  nausea or severe headache  -  increase in pain not controlled  -  increased amount of floaters (black spots in front of vision)         -  or signs of infection (pus, increasing redness or tenderness)  Revised 12/10/2015      [facility] Discharge  Instructions  Anesthesia ([facility] Care Center)   Adult Discharge Instructions    For 24 hours after surgery    1. Get plenty of rest.  Make arrangements to have a responsible adult stay with you for at least 6 hours after you leave the hospital.  2. Do not drive or use heavy equipment for 24 hours.    3. Do not drink alcohol for 24 hours.  4. Do not sign legal documents or make important decisions for 24 hours.  5. Avoid strenuous or risky activities. You may feel lightheaded.  If so, sit for a few minutes before standing.  Have someone help you get up.   6. Conscious sedation patients may resume a regular diet..  7. Any questions of medical nature, call your physician.

## 2017-11-07 NOTE — PROGRESS NOTES
Insulin Controlled Diabetes Follow-up    Subjective/Objective:    Roc Parkinson sent in blood glucose log for review. Last date of communication was: 10/11/17.    Diabetes is being managed with Injectable Medications: Levemir 14-0-0-0 (except WEd & Thurs chemo days takes 16 units) and NovoLog Insulin 11-10-9-5      BG/Food Log:   Eats 3 meals/day with carb at each meal, amount of carb varies mostly at breakfast depending on whether he eats egg and toast, malt-o-meal with honey, or cold cereal.   Lunch and dinner are protein, veg, and potato.   Drinks papaya juice every day at breakfast and dinner.  He can identify low BG's in the 70's and treats with a couple of candies.          Assessment: Past 10 days not including chemo days which are high    Fasting blood glucose: 80% in target.  Before lunch glucose: 30 %in target..  Before dinner glucose: 50% in target.  Bedtime glucose: 60% in target.    Roc would like to continue to increase insulin, does not like to see BG in 200's.   Reviewed that the majority (>50%) of BG readings are in goal all day except after breakfast. Discussed that the risk of low BG is not safe, no need to be overly aggressive. He is able to verbalize understanding and awareness that chemo days will just run higher.     He is agreeable to increasing breakfast Humalog by 1 unit to 12 units before breakfast to improve pre-lunch readings.   That is the only change today.    Plan/Response:  See Patient Instructions for co-developed, patient-stated behavior change goals.  Recommend increase to insulin - increase to 12 units Humalog before breakfast.  Keep all other dosing the same.    Kalyn Almanzar RD, LD, CDE      Any diabetes medication dose changes were made via the CDE Protocol and Collaborative Practice Agreement with the patient's referring provider. A copy of this encounter was shared with the provider.

## 2017-11-07 NOTE — MR AVS SNAPSHOT
After Visit Summary   11/7/2017    Roc Parkinson    MRN: [id number]           Patient Information     Date Of Birth          7/7/1926        Visit Information        Provider Department      11/7/2017 9:30 AM  DIABETIC ED RESOURCE [facility]        Care Instructions    Changes today:     Novolog  Before breakfast take 12 units.  Before lunch take 10 units.  Before dinner take 9 units.  Bedtime take 5 units.     Continue this scale:  If blood sugar is                 Take Novolog  140-175                                1 unit extra   176-210                                2 units extra  211-245                                3 units extra    246-280                                4 units extra   281-315                                5 units extra   316-350                                6 units extra   351-385                                7 units extra    386-420                                8 units extra  Over 420                               9 units extra      Continue Levemir 14 units every morning.     Wednesday and Thursday morning Levemir 16 units.      If you have any questions, call #[phone].   Please call if you have 3 low blood sugars in a week.                      Follow-ups after your visit        Your next 10 appointments already scheduled     Nov 08, 2017 11:30 AM CST   Level 1 with  INFUSION CHAIR 17   [facility] ([facility])    [facility]  [address] [facility] [address]  [facility] [address]   [phone]            Nov 28, 2017  2:30 PM CST   Diabetic Education with  NUTRITION RESOURCE   [facility] ([facility])    [address] [address]   [phone]            Nov 29, 2017 11:30 AM CST   Level 1 with  INFUSION CHAIR 13   [facility] ([facility]     "[facility]  [address]  [place] [address]   [phone]            Dec 20, 2017 11:00 AM CST   Level 1 with  INFUSION CHAIR 5   [facility] ([facility])    [facility] [place]  [address]  [place] [address]   [phone]            Dec 20, 2017 11:30 AM CST   Return Visit with Gretel Quinn MD   [facility] ([facility])    [facility]  [address]  [place] [address]   [phone]              Who to contact     If you have questions or need follow up information about today's clinic visit or your schedule please contact [facility] directly at [phone].  Normal or non-critical lab and imaging results will be communicated to you by Brand.nethart, letter or phone within 4 business days after the clinic has received the results. If you do not hear from us within 7 days, please contact the clinic through Brand.nethart or phone. If you have a critical or abnormal lab result, we will notify you by phone as soon as possible.  Submit refill requests through [employer] or call your pharmacy and they will forward the refill request to us. Please allow 3 business days for your refill to be completed.          Additional Information About Your Visit        Brand.nethart Information     [employer] lets you send messages to your doctor, view your test results, renew your prescriptions, schedule appointments and more. To sign up, go to [email or web address] . Click on \"Log in\" on the left side of the screen, which will take you to the Welcome page. Then click on \"Sign up Now\" on the right side of the page.     You will be asked to enter the access code listed below, as well as some personal information. Please follow the directions to create your username and password.     Your access code is: [email or web address]  Expires: 12/25/2017  9:12 AM     Your " access code will  in 90 days. If you need help or a new code, please call your [place] clinic or [phone].        Care EveryWhere ID     This is your Care EveryWhere ID. This could be used by other organizations to access your [place] medical records  [id number]         Blood Pressure from Last 3 Encounters:   17 150/67   10/25/17 152/69   10/24/17 137/68    Weight from Last 3 Encounters:   17 94.8 kg (209 lb)   10/25/17 94.8 kg (209 lb 1.6 oz)   10/24/17 94.8 kg (209 lb 1.6 oz)              Today, you had the following     No orders found for display       Primary Care Provider Office Phone # Fax #    Dickson Reich [phone] [address] [facility] [address]        Equal Access to Services     [facility]ANDRA [facility]DEL[facility] : Hadii aad ku hadasho Soomaali, waaxda luqadaha, qaybta kaalmada adeegyada, waxay idiin hayaan constantine shaw . So [facility] [phone].    ATENCIÓN: Si habla español, tiene a nolan disposición servicios gratuitos de asistencia lingüística. Eric al [phone].    We comply with applicable federal civil rights laws and [place] laws. We do not discriminate on the basis of race, color, national origin, age, disability, sex, sexual orientation, or gender identity.            Thank you!     Thank you for choosing [facility]  for your care. Our goal is always to provide you with excellent care. Hearing back from our patients is one way we can continue to improve our services. Please take a few minutes to complete the written survey that you may receive in the mail after your visit with us. Thank you!             Your Updated Medication List - Protect others around you: Learn how to safely use, store and throw away your medicines at www.disposemymeds.org.          This list is accurate as of: 17 10:19 AM.  Always use your most recent med list.                   Brand Name Dispense Instructions for use Diagnosis     acyclovir 400 MG tablet    ZOVIRAX    60 tablet    Take 1 tablet (400 mg) by mouth 2 times daily Viral Prophylaxis.    Multiple myeloma not having achieved remission (H)       amLODIPine 5 MG tablet    NORVASC    30 tablet    TAKE 1 TABLET BY MOUTH DAILY    Essential hypertension, benign       * B-D U/F 31G X 8 MM   Generic drug:  insulin pen needle           * B-D U/F 31G X 8 MM   Generic drug:  insulin pen needle     500 each    USE 5 PEN NEEDLES PER DAY OR AS DIRECTED    Type 2 diabetes mellitus with stage 3 chronic kidney disease, with long-term current use of insulin (H)       * cyclophosphamide 50 MG capsule CHEMO    CYTOXAN    24 capsule    Take 6 capsules (300 mg) by mouth once a week    Multiple myeloma not having achieved remission (H)       * cyclophosphamide 50 MG capsule CHEMO    CYTOXAN    24 capsule    Take 6 capsules (300 mg) by mouth once a week    Multiple myeloma not having achieved remission (H)       * cyclophosphamide 50 MG capsule CHEMO    CYTOXAN    24 capsule    Take 6 capsules (300 mg) by mouth once a week    Multiple myeloma not having achieved remission (H)       dexamethasone 4 MG tablet    DECADRON    40 tablet    Take 10 tablets (40 mg) by mouth every 7 days for 4 doses Take daily on Days 1, 8, 15, and 22.    Multiple myeloma not having achieved remission (H)       ferrous sulfate 325 (65 FE) MG tablet    IRON     Take 325 mg by mouth daily (with breakfast)        FREESTYLE LITE test strip   Generic drug:  blood glucose monitoring     200 strip    USE 1 STRIP TO TEST TWICE DAILY.    Uncontrolled type 1 diabetes mellitus with stage 3 chronic kidney disease (H)       furosemide 20 MG tablet    LASIX    30 tablet    Take 1 tablet (20 mg) by mouth daily    Multiple myeloma not having achieved remission (H)       gemfibrozil 600 MG tablet    LOPID    180 tablet    TAKE 1 TABLET BY MOUTH TWICE DAILY    Hyperlipidemia       glipiZIDE 10 MG tablet    GLUCOTROL    180 tablet    TAKE 1 TABLET BY  MOUTH TWICE DAILY BEFORE A MEAL    Type 2 diabetes mellitus with stage 3 chronic kidney disease, with long-term current use of insulin (H)       insulin aspart 100 UNIT/ML injection    NovoLOG PEN    12 mL    Inject 11 units before breakfast, 10 units before lunch, 9 units before dinner, and 5 units at bedtime. Before meal correction: 140-175 add 1 unit  176-210 add 2 units  211-245 add 3 units  246-280 add 4 units  281-315 add 5 units 316-350 add 6 units  351-385 add 7 units 386-420 add 8 units  Over 420 add 9 units    Uncontrolled type 2 diabetes mellitus with hyperglycemia, unspecified long term insulin use status (H)       insulin detemir 100 UNIT/ML injection    LEVEMIR FLEXPEN/FLEXTOUCH    15 mL    Inject 14 Units Subcutaneous every morning (before breakfast) On Wednesday and Thursday morning, inject 16 units.    Type 2 diabetes mellitus with diabetic chronic kidney disease, unspecified CKD stage, unspecified long term insulin use status (H)       levothyroxine 25 MCG tablet    SYNTHROID/LEVOTHROID    90 tablet    TAKE 1 TABLET BY MOUTH EVERY DAY.    Acquired hypothyroidism       * lisinopril 30 MG tablet    PRINIVIL,ZESTRIL    90 tablet    TAKE 1 TABLET BY MOUTH EVERY DAY    Essential hypertension, benign       * lisinopril 30 MG tablet    PRINIVIL,ZESTRIL    90 tablet    TAKE 1 TABLET BY MOUTH EVERY DAY    Essential hypertension, benign       metoprolol 25 MG tablet    LOPRESSOR    180 tablet    TAKE 1 TABLET BY MOUTH TWICE DAILY    Essential hypertension       omeprazole 20 MG CR capsule    priLOSEC    90 capsule    TAKE 1 CAPSULE BY MOUTH EVERY DAY    Congenital hiatus hernia       ondansetron 8 MG tablet    ZOFRAN    10 tablet    Take 1 tablet (8 mg) by mouth every 8 hours as needed (Nausea/Vomiting)    Multiple myeloma not having achieved remission (H)       polyethylene glycol powder    MIRALAX/GLYCOLAX          prochlorperazine 5 MG tablet    COMPAZINE    30 tablet    Take 1 tablet (5 mg) by mouth  every 6 hours as needed (Nausea/Vomiting)    Multiple myeloma not having achieved remission (H)       Selenium 200 MCG Tabs      Take 100 mcg by mouth daily. Pt takes one half tab of the 200 mcg daily        tamsulosin 0.4 MG capsule    FLOMAX    90 capsule    TAKE 1 CAPSULE BY MOUTH DAILY    Malignant neoplasm of prostate (H)       VITAMIN C PO      Take 1,000 mg by mouth daily        VITAMIN D3 PO      Take 1,000 Units by mouth daily        * Notice:  This list has 7 medication(s) that are the same as other medications prescribed for you. Read the directions carefully, and ask your doctor or other care provider to review them with you.

## 2017-11-07 NOTE — PATIENT INSTRUCTIONS
Changes today:     Novolog  Before breakfast take 12 units.  Before lunch take 10 units.  Before dinner take 9 units.  Bedtime take 5 units.     Continue this scale:  If blood sugar is                 Take Novolog  140-175                                1 unit extra   176-210                                2 units extra  211-245                                3 units extra    246-280                                4 units extra   281-315                                5 units extra   316-350                                6 units extra   351-385                                7 units extra    386-420                                8 units extra  Over 420                               9 units extra      Continue Levemir 14 units every morning.     Wednesday and Thursday morning Levemir 16 units.      If you have any questions, call #[phone].   Please call if you have 3 low blood sugars in a week.

## 2017-11-08 NOTE — PROGRESS NOTES
Infusion Nursing Note:  Roc RICARDO Parkinson presents today for labs, possible aranesp.    Patient seen by provider today: No   present during visit today: Not Applicable.    Note: N/A.    Intravenous Access:  Lab draw site right hand, Needle type BF, Gauge 23.  Labs drawn without difficulty.    Treatment Conditions:  Lab Results   Component Value Date    HGB 10.8 11/08/2017     Lab Results   Component Value Date    WBC 6.1 11/08/2017      Lab Results   Component Value Date    ANEU 5.5 11/08/2017     Lab Results   Component Value Date     11/08/2017      Results reviewed, labs did NOT meet treatment parameters: no need for aranesp today.        Post Infusion Assessment:  Site patent and intact, free from redness, edema or discomfort.  No evidence of extravasations.  Access discontinued per protocol.    Discharge Plan:   Patient declined prescription refills.  Discharge instructions reviewed with: Patient.  Patient and/or family verbalized understanding of discharge instructions and all questions answered.  Copy of AVS reviewed with patient and/or family.  Patient will return 11/29/17 for next appointment.  Patient discharged in stable condition accompanied by: self.  Departure Mode: Ambulatory.    Kia Whitman RN

## 2017-11-08 NOTE — PROGRESS NOTES
Oral Chemotherapy Monitoring Program.    Patient currently on oral cytoxan therapy.    Reviewed labs from 11/8/17    No concerning abnormalities.    Will follow up in 3 weeks with repeat labs.    Maria Eugenia Cortes PharmD  November 8, 2017

## 2017-11-08 NOTE — MR AVS SNAPSHOT
After Visit Summary   11/8/2017    Roc Parkinson    MRN: [id number]           Patient Information     Date Of Birth          7/7/1926        Visit Information        Provider Department      11/8/2017 11:30 AM  INFUSION CHAIR 17 [facility]        Today's Diagnoses     Multiple myeloma not having achieved remission (H)        Macrocytic anemia           Follow-ups after your visit        Your next 10 appointments already scheduled     Nov 28, 2017  2:30 PM CST   Diabetic Education with  NUTRITION RESOURCE   [facility] ([facility])    [address] [place] [address]   [phone]            Nov 29, 2017 11:30 AM CST   Level 1 with  INFUSION CHAIR 13   [facility] ([facility])    [facility]  [address]  [place] [address]   [phone]            Dec 20, 2017 11:00 AM CST   Level 1 with  INFUSION CHAIR 5   [facility] ([facility])    [facility]  [address]  [place] [address]   [phone]            Dec 20, 2017 11:30 AM CST   Return Visit with Gretel Quinn MD   [facility] ([facility])    [facility]  [address]  [place] [address] [phone]              Who to contact     If you have questions or need follow up information about today's clinic visit or your schedule please contact [facility] directly at [phone].  Normal or non-critical lab and imaging results will be communicated to you by MyChart, letter or phone within 4 business days after the clinic has received the results. If you do not hear from us within 7 days, please contact the clinic through MyChart or phone. If you have a critical or abnormal lab result, we  "will notify you by phone as soon as possible.  Submit refill requests through [employer] or call your pharmacy and they will forward the refill request to us. Please allow 3 business days for your refill to be completed.          Additional Information About Your Visit        tvCompasshart Information     [employer] lets you send messages to your doctor, view your test results, renew your prescriptions, schedule appointments and more. To sign up, go to [email or web address] . Click on \"Log in\" on the left side of the screen, which will take you to the Welcome page. Then click on \"Sign up Now\" on the right side of the page.     You will be asked to enter the access code listed below, as well as some personal information. Please follow the directions to create your username and password.     Your access code is: [email or web address]  Expires: 2017  9:12 AM     Your access code will  in 90 days. If you need help or a new code, please call your [place] clinic or [phone].        Care EveryWhere ID     This is your Care EveryWhere ID. This could be used by other organizations to access your [place] medical records  [id number]         Blood Pressure from Last 3 Encounters:   17 150/67   10/25/17 152/69   10/24/17 137/68    Weight from Last 3 Encounters:   17 94.8 kg (209 lb)   10/25/17 94.8 kg (209 lb 1.6 oz)   10/24/17 94.8 kg (209 lb 1.6 oz)              We Performed the Following     CBC with platelets differential     Comprehensive metabolic panel        Primary Care Provider Office Phone # Fax #    Dickson Reich [phone] [address] [facility] [address]        Equal Access to Services     ELIE GAGNON : Jeanne Driver, consuelo zamarripa, qashireen kaalmada paddy, melita rodirguez. So [facility] [phone].    ATENCIÓN: Si habla español, tiene a nolan disposición servicios gratuitos de asistencia lingüística. Llame al " [phone].    We comply with applicable federal civil rights laws and [place] laws. We do not discriminate on the basis of race, color, national origin, age, disability, sex, sexual orientation, or gender identity.            Thank you!     Thank you for choosing [facility]  for your care. Our goal is always to provide you with excellent care. Hearing back from our patients is one way we can continue to improve our services. Please take a few minutes to complete the written survey that you may receive in the mail after your visit with us. Thank you!             Your Updated Medication List - Protect others around you: Learn how to safely use, store and throw away your medicines at www.disposemymeds.org.          This list is accurate as of: 11/8/17 12:06 PM.  Always use your most recent med list.                   Brand Name Dispense Instructions for use Diagnosis    acyclovir 400 MG tablet    ZOVIRAX    60 tablet    Take 1 tablet (400 mg) by mouth 2 times daily Viral Prophylaxis.    Multiple myeloma not having achieved remission (H)       amLODIPine 5 MG tablet    NORVASC    30 tablet    TAKE 1 TABLET BY MOUTH DAILY    Essential hypertension, benign       * B-D U/F 31G X 8 MM   Generic drug:  insulin pen needle           * B-D U/F 31G X 8 MM   Generic drug:  insulin pen needle     500 each    USE 5 PEN NEEDLES PER DAY OR AS DIRECTED    Type 2 diabetes mellitus with stage 3 chronic kidney disease, with long-term current use of insulin (H)       * cyclophosphamide 50 MG capsule CHEMO    CYTOXAN    24 capsule    Take 6 capsules (300 mg) by mouth once a week    Multiple myeloma not having achieved remission (H)       * cyclophosphamide 50 MG capsule CHEMO    CYTOXAN    24 capsule    Take 6 capsules (300 mg) by mouth once a week    Multiple myeloma not having achieved remission (H)       * cyclophosphamide 50 MG capsule CHEMO    CYTOXAN    24 capsule    Take 6 capsules (300 mg) by  mouth once a week    Multiple myeloma not having achieved remission (H)       dexamethasone 4 MG tablet    DECADRON    40 tablet    Take 10 tablets (40 mg) by mouth every 7 days for 4 doses Take daily on Days 1, 8, 15, and 22.    Multiple myeloma not having achieved remission (H)       ferrous sulfate 325 (65 FE) MG tablet    IRON     Take 325 mg by mouth daily (with breakfast)        FREESTYLE LITE test strip   Generic drug:  blood glucose monitoring     200 strip    USE 1 STRIP TO TEST TWICE DAILY.    Uncontrolled type 1 diabetes mellitus with stage 3 chronic kidney disease (H)       furosemide 20 MG tablet    LASIX    30 tablet    Take 1 tablet (20 mg) by mouth daily    Multiple myeloma not having achieved remission (H)       gemfibrozil 600 MG tablet    LOPID    180 tablet    TAKE 1 TABLET BY MOUTH TWICE DAILY    Hyperlipidemia       glipiZIDE 10 MG tablet    GLUCOTROL    180 tablet    TAKE 1 TABLET BY MOUTH TWICE DAILY BEFORE A MEAL    Type 2 diabetes mellitus with stage 3 chronic kidney disease, with long-term current use of insulin (H)       * insulin aspart 100 UNIT/ML injection    NovoLOG PEN    12 mL    Inject 11 units before breakfast, 10 units before lunch, 9 units before dinner, and 5 units at bedtime. Before meal correction: 140-175 add 1 unit  176-210 add 2 units  211-245 add 3 units  246-280 add 4 units  281-315 add 5 units 316-350 add 6 units  351-385 add 7 units 386-420 add 8 units  Over 420 add 9 units    Uncontrolled type 2 diabetes mellitus with hyperglycemia, unspecified long term insulin use status (H)       * NovoLOG FLEXPEN 100 UNIT/ML injection   Generic drug:  insulin aspart     15 mL    ADMINISTER 1 TO 5 UNITS UNDER THE SKIN FOUR TIMES DAILY WITH MEALS AND NIGHTLY    Uncontrolled type 2 diabetes mellitus with hyperglycemia, unspecified long term insulin use status (H)       insulin detemir 100 UNIT/ML injection    LEVEMIR FLEXPEN/FLEXTOUCH    15 mL    Inject 14 Units Subcutaneous every  morning (before breakfast) On Wednesday and Thursday morning, inject 16 units.    Type 2 diabetes mellitus with diabetic chronic kidney disease, unspecified CKD stage, unspecified long term insulin use status (H)       levothyroxine 25 MCG tablet    SYNTHROID/LEVOTHROID    90 tablet    TAKE 1 TABLET BY MOUTH EVERY DAY    Acquired hypothyroidism       * lisinopril 30 MG tablet    PRINIVIL,ZESTRIL    90 tablet    TAKE 1 TABLET BY MOUTH EVERY DAY    Essential hypertension, benign       * lisinopril 30 MG tablet    PRINIVIL,ZESTRIL    90 tablet    TAKE 1 TABLET BY MOUTH EVERY DAY    Essential hypertension, benign       metoprolol 25 MG tablet    LOPRESSOR    180 tablet    TAKE 1 TABLET BY MOUTH TWICE DAILY    Essential hypertension       omeprazole 20 MG CR capsule    priLOSEC    90 capsule    TAKE 1 CAPSULE BY MOUTH EVERY DAY    Congenital hiatus hernia       ondansetron 8 MG tablet    ZOFRAN    10 tablet    Take 1 tablet (8 mg) by mouth every 8 hours as needed (Nausea/Vomiting)    Multiple myeloma not having achieved remission (H)       polyethylene glycol powder    MIRALAX/GLYCOLAX          prochlorperazine 5 MG tablet    COMPAZINE    30 tablet    Take 1 tablet (5 mg) by mouth every 6 hours as needed (Nausea/Vomiting)    Multiple myeloma not having achieved remission (H)       Selenium 200 MCG Tabs      Take 100 mcg by mouth daily. Pt takes one half tab of the 200 mcg daily        tamsulosin 0.4 MG capsule    FLOMAX    90 capsule    TAKE 1 CAPSULE BY MOUTH DAILY    Malignant neoplasm of prostate (H)       VITAMIN C PO      Take 1,000 mg by mouth daily        VITAMIN D3 PO      Take 1,000 Units by mouth daily        * Notice:  This list has 9 medication(s) that are the same as other medications prescribed for you. Read the directions carefully, and ask your doctor or other care provider to review them with you.

## 2017-11-10 NOTE — TELEPHONE ENCOUNTER
Reason for Call:  Medication or medication refill:    Do you use a [facility]?  Name of the pharmacy and phone number for the current request:  [facility]    Name of the medication requested: Novalog    Other request: Pt says pharmacy won't fill until the 27th.  He will be out in a few days.  He uses more units  then prescribed.    Can we leave a detailed message on this number? YES    Phone number patient can be reached at: Home number on file [phone] (home)    Best Time: any    Call taken on 11/10/2017 at 8:42 AM by MANGO RICARDO

## 2017-11-10 NOTE — TELEPHONE ENCOUNTER
"Patient called requesting updated Novolog script. States he takes a higher dose than prescribed as advised from [facility] \"diabetic counselor\". He will be out of medication today. \"Chemo spikes my blood sugars\". Reports he takes Novalog 12 units in the morning, 10 units at lunch, 9 units at dinner and 5 units HS. He also uses sliding scale below. Please advice on request.    140-175  1 extra unit  176-210  2 extra units  211-245  3 extra units  246-280  4 extra units  281- 315 5 extra units  316-350  6 extra units  351-385  7 extra units  386-420 8  extra units  420 >   9 extra untis      "

## 2017-11-21 NOTE — MR AVS SNAPSHOT
After Visit Summary   11/21/2017    Roc Parkinson    MRN: [id number]           Patient Information     Date Of Birth          7/7/1926        Visit Information        Provider Department      11/21/2017 2:30 PM Dickson Reich MD [facility]        Today's Diagnoses     Chest wall pain    -  1    Essential hypertension, benign          Care Instructions    We will treat this symptomatically.  I suspect this is a mild contusion of the chest wall.  He does get some improvement with Tylenol and will continue with that.  We will await the official reading by radiology to make sure we have not missed any rib issues.  Follow-up otherwise will be as needed.  I did cut his lisinopril in half due to the fact that he has been getting lightheaded when he sits up from lying in bed.  We will follow-up in a few weeks with his blood pressure check in the office.  Otherwise he will recheck with me sooner as needed.          Follow-ups after your visit        Your next 10 appointments already scheduled     Nov 28, 2017  2:30 PM CST   Diabetic Education with  NUTRITION RESOURCE   [facility]    [address] [address]   [phone]            Nov 29, 2017 11:30 AM CST   Level 1 with  INFUSION CHAIR 13   [facility] ([facility])    [facility]  [address] [address]   [phone]            Dec 04, 2017 10:30 AM CST   SHORT with Dickson Reich MD   [facility] ([facility])    [address] [place] [address]   [phone]            Dec 20, 2017 11:00 AM CST   Level 1 with  INFUSION CHAIR 5   [facility] ([facility])    [facility]  "[place]  [address]  [place] [address]   [phone]            Dec 20, 2017 11:30 AM CST   Return Visit with Gretel Quinn MD   [facility] ([facility])    [facility]  [address]  [place] [address]   [phone]              Who to contact     If you have questions or need follow up information about today's clinic visit or your schedule please contact [facility] directly at [phone].  Normal or non-critical lab and imaging results will be communicated to you by [employer], letter or phone within 4 business days after the clinic has received the results. If you do not hear from us within 7 days, please contact the clinic through [employer] or phone. If you have a critical or abnormal lab result, we will notify you by phone as soon as possible.  Submit refill requests through [employer] or call your pharmacy and they will forward the refill request to us. Please allow 3 business days for your refill to be completed.          Additional Information About Your Visit        [employer] Information     [employer] lets you send messages to your doctor, view your test results, renew your prescriptions, schedule appointments and more. To sign up, go to [email or web address] . Click on \"Log in\" on the left side of the screen, which will take you to the Welcome page. Then click on \"Sign up Now\" on the right side of the page.     You will be asked to enter the access code listed below, as well as some personal information. Please follow the directions to create your username and password.     Your access code is: [email or web address]  Expires: 2017  9:12 AM     Your access code will  in 90 days. If you need help or a new code, please call your [facility] or [phone].        Care EveryWhere ID     This is your Care EveryWhere ID. This could be used by other organizations to access your [place] medical " records  [id number]        Your Vitals Were     Pulse Temperature Respirations BMI (Body Mass Index)          82 99.1  F (37.3  C) (Tympanic) 14 29.27 kg/m2         Blood Pressure from Last 3 Encounters:   11/21/17 110/50   11/01/17 150/67   10/25/17 152/69    Weight from Last 3 Encounters:   11/21/17 204 lb (92.5 kg)   11/01/17 209 lb (94.8 kg)   10/25/17 209 lb 1.6 oz (94.8 kg)                 Today's Medication Changes          These changes are accurate as of: 11/21/17  6:26 PM.  If you have any questions, ask your nurse or doctor.               These medicines have changed or have updated prescriptions.        Dose/Directions    * lisinopril 30 MG tablet   Commonly known as:  PRINIVIL,ZESTRIL   This may have changed:  Another medication with the same name was changed. Make sure you understand how and when to take each.   Used for:  Essential hypertension, benign   Changed by:  Dickson Reich MD        TAKE 1 TABLET BY MOUTH EVERY DAY   Quantity:  90 tablet   Refills:  3       * lisinopril 30 MG tablet   Commonly known as:  PRINIVIL,ZESTRIL   This may have changed:  See the new instructions.   Used for:  Essential hypertension, benign   Changed by:  Dickson Reich MD        Dose:  15 mg   Take 0.5 tablets (15 mg) by mouth daily   Quantity:  90 tablet   Refills:  0       * Notice:  This list has 2 medication(s) that are the same as other medications prescribed for you. Read the directions carefully, and ask your doctor or other care provider to review them with you.         Where to get your medicines      Some of these will need a paper prescription and others can be bought over the counter.  Ask your nurse if you have questions.     You don't need a prescription for these medications     lisinopril 30 MG tablet                Primary Care Provider Office Phone # Fax #    Dickson Reich [phone] [address] [facility] [address]        Equal Access to  Services     [facility]: Hadii aad ku hademelyvincenzo Shivaniaimee, waaxda luqadaha, qaybta kaalmada paddy, melita shaw . So [facility] [phone].    ATENCIÓN: Si nilela angelo, tiene a nolan disposición servicios gratuitos de asistencia lingüística. Llame al [phone].    We comply with applicable federal civil rights laws and [place] laws. We do not discriminate on the basis of race, color, national origin, age, disability, sex, sexual orientation, or gender identity.            Thank you!     Thank you for choosing [facility]  for your care. Our goal is always to provide you with excellent care. Hearing back from our patients is one way we can continue to improve our services. Please take a few minutes to complete the written survey that you may receive in the mail after your visit with us. Thank you!             Your Updated Medication List - Protect others around you: Learn how to safely use, store and throw away your medicines at www.disposemymeds.org.          This list is accurate as of: 11/21/17  6:26 PM.  Always use your most recent med list.                   Brand Name Dispense Instructions for use Diagnosis    acyclovir 400 MG tablet    ZOVIRAX    60 tablet    Take 1 tablet (400 mg) by mouth 2 times daily Viral Prophylaxis.    Multiple myeloma not having achieved remission (H)       amLODIPine 5 MG tablet    NORVASC    30 tablet    TAKE 1 TABLET BY MOUTH DAILY    Essential hypertension, benign       * B-D U/F 31G X 8 MM   Generic drug:  insulin pen needle           * B-D U/F 31G X 8 MM   Generic drug:  insulin pen needle     500 each    USE 5 PEN NEEDLES PER DAY OR AS DIRECTED    Type 2 diabetes mellitus with stage 3 chronic kidney disease, with long-term current use of insulin (H)       * cyclophosphamide 50 MG capsule CHEMO    CYTOXAN    24 capsule    Take 6 capsules (300 mg) by mouth once a week    Multiple myeloma not having achieved remission (H)        * cyclophosphamide 50 MG capsule CHEMO    CYTOXAN    24 capsule    Take 6 capsules (300 mg) by mouth once a week    Multiple myeloma not having achieved remission (H)       * cyclophosphamide 50 MG capsule CHEMO    CYTOXAN    24 capsule    Take 6 capsules (300 mg) by mouth once a week    Multiple myeloma not having achieved remission (H)       * cyclophosphamide 50 MG capsule CHEMO    CYTOXAN    24 capsule    Take 6 capsules (300 mg) by mouth once a week    Multiple myeloma not having achieved remission (H)       dexamethasone 4 MG tablet    DECADRON    40 tablet    Take 10 tablets (40 mg) by mouth every 7 days for 4 doses Take daily on Days 1, 8, 15, and 22.    Multiple myeloma not having achieved remission (H)       ferrous sulfate 325 (65 FE) MG tablet    IRON     Take 325 mg by mouth daily (with breakfast)        FREESTYLE LITE test strip   Generic drug:  blood glucose monitoring     200 strip    USE 1 STRIP TO TEST TWICE DAILY.    Uncontrolled type 1 diabetes mellitus with stage 3 chronic kidney disease (H)       furosemide 20 MG tablet    LASIX    30 tablet    Take 1 tablet (20 mg) by mouth daily    Multiple myeloma not having achieved remission (H)       gemfibrozil 600 MG tablet    LOPID    180 tablet    TAKE 1 TABLET BY MOUTH TWICE DAILY    Hyperlipidemia       glipiZIDE 10 MG tablet    GLUCOTROL    180 tablet    TAKE 1 TABLET BY MOUTH TWICE DAILY BEFORE A MEAL    Type 2 diabetes mellitus with stage 3 chronic kidney disease, with long-term current use of insulin (H)       * insulin aspart 100 UNIT/ML injection    NovoLOG PEN    12 mL    Inject 11 units before breakfast, 10 units before lunch, 9 units before dinner, and 5 units at bedtime. Before meal correction: 140-175 add 1 unit  176-210 add 2 units  211-245 add 3 units  246-280 add 4 units  281-315 add 5 units 316-350 add 6 units  351-385 add 7 units 386-420 add 8 units  Over 420 add 9 units    Uncontrolled type 2 diabetes mellitus with  hyperglycemia, unspecified long term insulin use status (H)       * NovoLOG FLEXPEN 100 UNIT/ML injection   Generic drug:  insulin aspart     15 mL    ADMINISTER 1 TO 5 UNITS UNDER THE SKIN FOUR TIMES DAILY WITH MEALS AND NIGHTLY    Uncontrolled type 2 diabetes mellitus with hyperglycemia, unspecified long term insulin use status (H)       insulin detemir 100 UNIT/ML injection    LEVEMIR FLEXPEN/FLEXTOUCH    15 mL    Inject 14 Units Subcutaneous every morning (before breakfast) On Wednesday and Thursday morning, inject 16 units.    Type 2 diabetes mellitus with diabetic chronic kidney disease, unspecified CKD stage, unspecified long term insulin use status (H)       levothyroxine 25 MCG tablet    SYNTHROID/LEVOTHROID    90 tablet    TAKE 1 TABLET BY MOUTH EVERY DAY    Acquired hypothyroidism       * lisinopril 30 MG tablet    PRINIVIL,ZESTRIL    90 tablet    TAKE 1 TABLET BY MOUTH EVERY DAY    Essential hypertension, benign       * lisinopril 30 MG tablet    PRINIVIL,ZESTRIL    90 tablet    Take 0.5 tablets (15 mg) by mouth daily    Essential hypertension, benign       metoprolol 25 MG tablet    LOPRESSOR    180 tablet    TAKE 1 TABLET BY MOUTH TWICE DAILY    Essential hypertension       omeprazole 20 MG CR capsule    priLOSEC    90 capsule    TAKE 1 CAPSULE BY MOUTH EVERY DAY    Congenital hiatus hernia       ondansetron 8 MG tablet    ZOFRAN    10 tablet    Take 1 tablet (8 mg) by mouth every 8 hours as needed (Nausea/Vomiting)    Multiple myeloma not having achieved remission (H)       polyethylene glycol powder    MIRALAX/GLYCOLAX          prochlorperazine 5 MG tablet    COMPAZINE    30 tablet    Take 1 tablet (5 mg) by mouth every 6 hours as needed (Nausea/Vomiting)    Multiple myeloma not having achieved remission (H)       Selenium 200 MCG Tabs      Take 100 mcg by mouth daily. Pt takes one half tab of the 200 mcg daily        tamsulosin 0.4 MG capsule    FLOMAX    90 capsule    TAKE 1 CAPSULE BY MOUTH DAILY     Malignant neoplasm of prostate (H)       VITAMIN C PO      Take 1,000 mg by mouth daily        VITAMIN D3 PO      Take 1,000 Units by mouth daily        * Notice:  This list has 10 medication(s) that are the same as other medications prescribed for you. Read the directions carefully, and ask your doctor or other care provider to review them with you.

## 2017-11-21 NOTE — NURSING NOTE
"Chief Complaint   Patient presents with     Fall       Initial /50  Pulse 82  Temp 99.1  F (37.3  C) (Tympanic)  Resp 14  Wt 204 lb (92.5 kg)  BMI 29.27 kg/m2 Estimated body mass index is 29.27 kg/(m^2) as calculated from the following:    Height as of 10/25/17: 5' 10\" (1.778 m).    Weight as of this encounter: 204 lb (92.5 kg).  Medication Reconciliation: complete     Mariia Allen CMA      "

## 2017-11-21 NOTE — PROGRESS NOTES
SUBJECTIVE:   Roc Parkinson is a 89 year old male who presents to clinic today for the following health issues:      Musculoskeletal problem/pain      Duration: 5 days ago fell     Description  Location: middle of chest    Intensity:  mild    Accompanying signs and symptoms: pain when moving around- feels like muscle soreness    History  Previous similar problem: no   Previous evaluation:  none    Precipitating or alleviating factors:  Trauma or overuse: YES- fell sideways on lt shoulder/back at 1:30 am when getting out of bed to get up to urinate.  Aggravating factors include: none    Therapies tried and outcome: acetaminophen- helps            Problem list and histories reviewed & adjusted, as indicated.  Additional history: as documented    Patient Active Problem List   Diagnosis     Dysphagia     Malignant neoplasm of prostate (H)     Malignant neoplasm of bladder (H)     Essential hypertension, benign     Asymptomatic varicose veins     Congenital hiatus hernia     Oral lesion     CTS (carpal tunnel syndrome)     Irritable bowel syndrome     Vitamin D deficiency disease     Microalbuminuria     Obesity     UTI (urinary tract infection) w hx of recurrence     Iron deficiency anemia     Nonsmoker     A-fib (H)     Health Care Home     Hyperlipidemia     CKD (chronic kidney disease) stage 3, GFR 30-59 ml/min     Hypothyroidism     Long-term (current) use of anticoagulants [Z79.01]     Macrocytic anemia     GIB (gastrointestinal bleeding)     Multiple myeloma not having achieved remission (H)     Hypercalcemia     Hyperglycemia     Urinary tract infection     Hyperkalemia     ACP (advance care planning)     MDS (myelodysplastic syndrome) (H)     Chemotherapy-induced neutropenia (H)     Anemia in neoplastic disease     Type 2 diabetes mellitus with stage 3 chronic kidney disease, with long-term current use of insulin (H)     Past Surgical History:   Procedure Laterality Date     ARTHROPLASTY KNEE  11/5/2012     Procedure: ARTHROPLASTY KNEE;  RIGHT TOTAL KNEE ARTHROPLASTY (SMITH & NEPHEW)^;  Surgeon: Dickson Schulte MD;  Location:  OR     BIOPSY      bladder     COLONOSCOPY  2007     COLONOSCOPY N/A 11/15/2016    Procedure: COMBINED COLONOSCOPY, SINGLE OR MULTIPLE BIOPSY/POLYPECTOMY BY BIOPSY;  Surgeon: Kenneth Fulton MD;  Location:  GI     GENITOURINARY SURGERY      TURP x2 and bladder scraping     GI SURGERY      anal fistula     ORTHOPEDIC SURGERY      lt knee in nov.2009     PHACOEMULSIFICATION CLEAR CORNEA WITH STANDARD INTRAOCULAR LENS IMPLANT Left 10/25/2017    Procedure: PHACOEMULSIFICATION CLEAR CORNEA WITH STANDARD INTRAOCULAR LENS IMPLANT;  LEFT EYE PHACOEMULSIFICATION CLEAR CORNEA WITH STANDARD INTRAOCULAR LENS IMPLANT ;  Surgeon: Shady Haider MD;  Location:  EC     PHACOEMULSIFICATION CLEAR CORNEA WITH STANDARD INTRAOCULAR LENS IMPLANT Right 11/1/2017    Procedure: PHACOEMULSIFICATION CLEAR CORNEA WITH STANDARD INTRAOCULAR LENS IMPLANT;  RIGHT EYE PHACOEMULSIFICATION CLEAR CORNEA WITH STANDARD INTRAOCULAR LENS IMPLANT;  Surgeon: Shady Haider MD;  Location:  EC     SOFT TISSUE SURGERY      melanoma       Social History   Substance Use Topics     Smoking status: Never Smoker     Smokeless tobacco: Never Used     Alcohol use No     Family History   Problem Relation Age of Onset     Cardiovascular Father 81     heart arrythmia     Endocrine Disease Brother 74     Paget's             Reviewed and updated as needed this visit by clinical staffTobacco  Allergies  Meds  Med Hx  Surg Hx  Fam Hx  Soc Hx      Reviewed and updated as needed this visit by Provider         ROS:  Constitutional, HEENT, cardiovascular, pulmonary, gi and gu systems are negative, except as otherwise noted.      OBJECTIVE:                                                    /50  Pulse 82  Temp 99.1  F (37.3  C) (Tympanic)  Resp 14  Wt 204 lb (92.5 kg)  BMI 29.27 kg/m2  Body mass index is 29.27  kg/(m^2).  GENERAL APPEARANCE: healthy, alert and no distress  RESP: lungs clear to auscultation - no rales, rhonchi or wheezes and there is slight tenderness along the left sternal border towards the bottom of the sternum.  and exquisite tenderness in one spot right at the second to last rib insertion at the sternum on the left.    Diagnostic test results:  Diagnostic Test Results:  CXR - unremarkable to my reading       ASSESSMENT/PLAN:                                                      ICD-10-CM    1. Chest wall pain R07.89 XR Ribs & Chest Left G/E 3 Views    along the left side of the sternum   2. Essential hypertension, benign I10 lisinopril (PRINIVIL,ZESTRIL) 30 MG tablet       Patient Instructions   We will treat this symptomatically.  I suspect this is a mild contusion of the chest wall.  He does get some improvement with Tylenol and will continue with that.  We will await the official reading by radiology to make sure we have not missed any rib issues.  Follow-up otherwise will be as needed.  I did cut his lisinopril in half due to the fact that he has been getting lightheaded when he sits up from lying in bed.  We will follow-up in a few weeks with his blood pressure check in the office.  Otherwise he will recheck with me sooner as needed.      Dickson Reich MD  [facility]

## 2017-11-22 NOTE — PATIENT INSTRUCTIONS
We will treat this symptomatically.  I suspect this is a mild contusion of the chest wall.  He does get some improvement with Tylenol and will continue with that.  We will await the official reading by radiology to make sure we have not missed any rib issues.  Follow-up otherwise will be as needed.  I did cut his lisinopril in half due to the fact that he has been getting lightheaded when he sits up from lying in bed.  We will follow-up in a few weeks with his blood pressure check in the office.  Otherwise he will recheck with me sooner as needed.

## 2017-11-29 NOTE — PROGRESS NOTES
Infusion Nursing Note:  Roc Parkinson presents today for labs/zometa/possible aranesp.    Patient seen by provider today: No   present during visit today: Not Applicable.    Note: N/A.    Intravenous Access:  Labs drawn without difficulty.  Peripheral IV placed.    Treatment Conditions:  Lab Results   Component Value Date    HGB 8.4 11/29/2017     Lab Results   Component Value Date    WBC 5.5 11/29/2017      Lab Results   Component Value Date    ANEU 5.0 11/29/2017     Lab Results   Component Value Date     11/29/2017      Lab Results   Component Value Date     11/29/2017                   Lab Results   Component Value Date    POTASSIUM 4.6 11/29/2017           No results found for: MAG         Lab Results   Component Value Date    CR 1.19 11/29/2017                   Lab Results   Component Value Date    MICHELLE 9.5 11/29/2017                Lab Results   Component Value Date    BILITOTAL 0.5 11/29/2017           Lab Results   Component Value Date    ALBUMIN 2.8 11/29/2017                    Lab Results   Component Value Date    ALT 14 11/29/2017           Lab Results   Component Value Date    AST 15 11/29/2017     Results reviewed, labs MET treatment parameters, ok to proceed with treatment. Aranesp and Zometa          Post Infusion Assessment:  Patient tolerated infusion without incident.  Patient tolerated injection without incident.  Blood return noted pre and post infusion.  Site patent and intact, free from redness, edema or discomfort.  No evidence of extravasations.  Access discontinued per protocol.    Discharge Plan:   Discharge instructions reviewed with: Patient.  Patient and/or family verbalized understanding of discharge instructions and all questions answered.  Copy of AVS reviewed with patient and/or family.  Patient will return 12/20/2017 for next appointment.  Patient discharged in stable condition accompanied by: self.  Departure Mode: Ambulatory.    Alberto Nash,  KENDALL Singh RN

## 2017-11-29 NOTE — PROGRESS NOTES
Oral Chemotherapy Monitoring Program.     Patient currently on oral cytoxan therapy.     Reviewed labs from 11/29/17     No concerning abnormalities.     Will follow up in 3 weeks with repeat labs    Celia Damico, PharmD, BCPS, BCOP  Hematology/Oncology Clinical Pharmacist  [facility]  [email or web address]

## 2017-11-29 NOTE — MR AVS SNAPSHOT
After Visit Summary   11/29/2017    Roc Parkinson    MRN: [id number]           Patient Information     Date Of Birth          7/7/1926        Visit Information        Provider Department      11/29/2017 11:30 AM  INFUSION CHAIR 13 [facility]        Today's Diagnoses     Multiple myeloma not having achieved remission (H)    -  1    Macrocytic anemia        MDS (myelodysplastic syndrome) (H)        Hypercalcemia           Follow-ups after your visit        Your next 10 appointments already scheduled     Dec 04, 2017 10:30 AM CST   SHORT with Dickson Reich MD   [facility]    [address] [address]   [phone]            Dec 20, 2017 11:00 AM CST   Level 1 with  INFUSION CHAIR 5   [facility] ([facility])    [facility]  [address]  [facility] [address] [phone]            Dec 20, 2017 11:30 AM CST   Return Visit with Gretel Quinn MD   [facility] ([facility])    [facility]  [address]  [facility] [address] [phone]            Dec 26, 2017 10:30 AM CST   Diabetic Education with  DIABETIC ED RESOURCE   [facility] ([facility])    [address] [id number] [phone]              Who to contact     If you have questions or need follow up information about today's clinic visit or your schedule please contact [facility] [place] directly at [phone].  Normal or non-critical lab and imaging results will be communicated to you by MyChart, letter or phone within 4 business days after the clinic has received the results. If you do not hear from us within 7 days, please contact the clinic through  "[facility] or phone. If you have a critical or abnormal lab result, we will notify you by phone as soon as possible.  Submit refill requests through [employer] or call your pharmacy and they will forward the refill request to us. Please allow 3 business days for your refill to be completed.          Additional Information About Your Visit        [facility] Information     [employer] lets you send messages to your doctor, view your test results, renew your prescriptions, schedule appointments and more. To sign up, go to [email or web address] . Click on \"Log in\" on the left side of the screen, which will take you to the Welcome page. Then click on \"Sign up Now\" on the right side of the page.     You will be asked to enter the access code listed below, as well as some personal information. Please follow the directions to create your username and password.     Your access code is: [email or web address]  Expires: 2017  9:12 AM     Your access code will  in 90 days. If you need help or a new code, please call your [place] clinic or [phone].        Care EveryWhere ID     This is your Care EveryWhere ID. This could be used by other organizations to access your [place] medical records  [id number]        Your Vitals Were     Pulse Temperature Respirations             82 97.9  F (36.6  C) (Oral) 18          Blood Pressure from Last 3 Encounters:   17 160/62   17 110/50   17 150/67    Weight from Last 3 Encounters:   17 92.5 kg (204 lb)   17 94.8 kg (209 lb)   10/25/17 94.8 kg (209 lb 1.6 oz)              We Performed the Following     CBC with platelets differential     Comprehensive metabolic panel        Primary Care Provider Office Phone # Fax #    Dickson Reich [phone] [address]  [facility] [address]        Equal Access to Services     ELIE GAGNON AH: Hadii virginia griffino Soaimee, waaxda luqadaha, qaybta kaalmahanh thomason, melita fitch " kavon shaw ah. So [facility] [phone].    ATENCIÓN: Si renaldo stephens, tiene a nolan disposición servicios gratuitos de asistencia lingüística. Eric al [phone].    We comply with applicable federal civil rights laws and [place] laws. We do not discriminate on the basis of race, color, national origin, age, disability, sex, sexual orientation, or gender identity.            Thank you!     Thank you for choosing [facility]  for your care. Our goal is always to provide you with excellent care. Hearing back from our patients is one way we can continue to improve our services. Please take a few minutes to complete the written survey that you may receive in the mail after your visit with us. Thank you!             Your Updated Medication List - Protect others around you: Learn how to safely use, store and throw away your medicines at www.disposemymeds.org.          This list is accurate as of: 11/29/17 12:55 PM.  Always use your most recent med list.                   Brand Name Dispense Instructions for use Diagnosis    acyclovir 400 MG tablet    ZOVIRAX    60 tablet    Take 1 tablet (400 mg) by mouth 2 times daily Viral Prophylaxis.    Multiple myeloma not having achieved remission (H)       amLODIPine 5 MG tablet    NORVASC    30 tablet    TAKE 1 TABLET BY MOUTH DAILY    Essential hypertension, benign       * B-D U/F 31G X 8 MM   Generic drug:  insulin pen needle           * B-D U/F 31G X 8 MM   Generic drug:  insulin pen needle     500 each    USE 5 PEN NEEDLES PER DAY OR AS DIRECTED    Type 2 diabetes mellitus with stage 3 chronic kidney disease, with long-term current use of insulin (H)       * cyclophosphamide 50 MG capsule CHEMO    CYTOXAN    24 capsule    Take 6 capsules (300 mg) by mouth once a week    Multiple myeloma not having achieved remission (H)       * cyclophosphamide 50 MG capsule CHEMO    CYTOXAN    24 capsule    Take 6 capsules (300 mg) by mouth once a week     Multiple myeloma not having achieved remission (H)       * cyclophosphamide 50 MG capsule CHEMO    CYTOXAN    24 capsule    Take 6 capsules (300 mg) by mouth once a week    Multiple myeloma not having achieved remission (H)       * cyclophosphamide 50 MG capsule CHEMO    CYTOXAN    24 capsule    Take 6 capsules (300 mg) by mouth once a week    Multiple myeloma not having achieved remission (H)       dexamethasone 4 MG tablet    DECADRON    40 tablet    Take 10 tablets (40 mg) by mouth every 7 days for 4 doses Take daily on Days 1, 8, 15, and 22.    Multiple myeloma not having achieved remission (H)       ferrous sulfate 325 (65 FE) MG tablet    IRON     Take 325 mg by mouth daily (with breakfast)        FREESTYLE LITE test strip   Generic drug:  blood glucose monitoring     200 strip    USE 1 STRIP TO TEST TWICE DAILY.    Uncontrolled type 1 diabetes mellitus with stage 3 chronic kidney disease (H)       furosemide 20 MG tablet    LASIX    30 tablet    Take 1 tablet (20 mg) by mouth daily    Multiple myeloma not having achieved remission (H)       gemfibrozil 600 MG tablet    LOPID    180 tablet    TAKE 1 TABLET BY MOUTH TWICE DAILY    Hyperlipidemia       glipiZIDE 10 MG tablet    GLUCOTROL    180 tablet    TAKE 1 TABLET BY MOUTH TWICE DAILY BEFORE A MEAL    Type 2 diabetes mellitus with stage 3 chronic kidney disease, with long-term current use of insulin (H)       * insulin aspart 100 UNIT/ML injection    NovoLOG PEN    12 mL    Inject 11 units before breakfast, 10 units before lunch, 9 units before dinner, and 5 units at bedtime. Before meal correction: 140-175 add 1 unit  176-210 add 2 units  211-245 add 3 units  246-280 add 4 units  281-315 add 5 units 316-350 add 6 units  351-385 add 7 units 386-420 add 8 units  Over 420 add 9 units    Uncontrolled type 2 diabetes mellitus with hyperglycemia, unspecified long term insulin use status (H)       * NovoLOG FLEXPEN 100 UNIT/ML injection   Generic drug:  insulin  aspart     15 mL    ADMINISTER 1 TO 5 UNITS UNDER THE SKIN FOUR TIMES DAILY WITH MEALS AND NIGHTLY    Uncontrolled type 2 diabetes mellitus with hyperglycemia, unspecified long term insulin use status (H)       insulin detemir 100 UNIT/ML injection    LEVEMIR FLEXPEN/FLEXTOUCH    15 mL    Inject 14 Units Subcutaneous every morning (before breakfast) On Wednesday and Thursday morning, inject 16 units.    Type 2 diabetes mellitus with diabetic chronic kidney disease, unspecified CKD stage, unspecified long term insulin use status (H)       levothyroxine 25 MCG tablet    SYNTHROID/LEVOTHROID    90 tablet    TAKE 1 TABLET BY MOUTH EVERY DAY    Acquired hypothyroidism       * lisinopril 30 MG tablet    PRINIVIL,ZESTRIL    90 tablet    TAKE 1 TABLET BY MOUTH EVERY DAY    Essential hypertension, benign       * lisinopril 30 MG tablet    PRINIVIL,ZESTRIL    90 tablet    Take 0.5 tablets (15 mg) by mouth daily    Essential hypertension, benign       metoprolol 25 MG tablet    LOPRESSOR    180 tablet    TAKE 1 TABLET BY MOUTH TWICE DAILY    Essential hypertension       omeprazole 20 MG CR capsule    priLOSEC    90 capsule    TAKE 1 CAPSULE BY MOUTH EVERY DAY    Congenital hiatus hernia       ondansetron 8 MG tablet    ZOFRAN    10 tablet    Take 1 tablet (8 mg) by mouth every 8 hours as needed (Nausea/Vomiting)    Multiple myeloma not having achieved remission (H)       polyethylene glycol powder    MIRALAX/GLYCOLAX          prochlorperazine 5 MG tablet    COMPAZINE    30 tablet    Take 1 tablet (5 mg) by mouth every 6 hours as needed (Nausea/Vomiting)    Multiple myeloma not having achieved remission (H)       Selenium 200 MCG Tabs      Take 100 mcg by mouth daily. Pt takes one half tab of the 200 mcg daily        tamsulosin 0.4 MG capsule    FLOMAX    90 capsule    TAKE 1 CAPSULE BY MOUTH DAILY    Malignant neoplasm of prostate (H)       VITAMIN C PO      Take 1,000 mg by mouth daily        VITAMIN D3 PO      Take 1,000  Units by mouth daily        * Notice:  This list has 10 medication(s) that are the same as other medications prescribed for you. Read the directions carefully, and ask your doctor or other care provider to review them with you.       Otc Regimen: Excipial 20% Urea cream once daily Action 1: Continue Detail Level: Zone

## 2017-12-01 NOTE — PROGRESS NOTES
"SUBJECTIVE: Roc Parkinson is a 89 year old male who  presents today for a possible UTI.   Symptoms of dysuria and frequency have been going on forday(s).    Hematuria no.  sudden onsetand moderate.    There is no history of fever, chills, nausea or vomiting.   This patient does have a history of urinary tract infections.   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 Urinary tract infection without hematuria, site unspecified N39.0 ciprofloxacin (CIPRO) 250 MG tablet   2. Dysuria R30.0 UA with Microscopic reflex to Culture   3. Nonspecific finding on examination of urine R82.90 Urine Culture Aerobic Bacterial   4. CKD (chronic kidney disease) stage 3, GFR 30-59 ml/min N18.3    5. Type 2 diabetes mellitus with stage 3 chronic kidney disease, with long-term current use of insulin (H) E11.22     N18.3     Z79.4    6. Malignant neoplasm of urinary bladder, unspecified site (H) C67.9        Drink plenty of fluids.  Prevention and treatment of UTI's discussed.Signs and symptoms of pyelonephritis mentioned.  Follow up with primary care physician if not improving    "

## 2017-12-01 NOTE — MR AVS SNAPSHOT
After Visit Summary   12/1/2017    Roc Parkinson    MRN: [id number]           Patient Information     Date Of Birth          7/7/1926        Visit Information        Provider Department      12/1/2017 2:45 PM Sumit Duff DO [facility]        Today's Diagnoses     Urinary tract infection without hematuria, site unspecified    -  1    Dysuria        Nonspecific finding on examination of urine        CKD (chronic kidney disease) stage 3, GFR 30-59 ml/min        Type 2 diabetes mellitus with stage 3 chronic kidney disease, with long-term current use of insulin (H)        Malignant neoplasm of urinary bladder, unspecified site (H)           Follow-ups after your visit        Your next 10 appointments already scheduled     Dec 04, 2017 10:30 AM CST   SHORT with Dickson Reich MD   [facility] ([facility])    [address] [place] [address]   [phone]            Dec 20, 2017 11:00 AM CST   Level 1 with  INFUSION CHAIR 5   [facility] ([facility])    [facility]  [address]  [facility] [address]   [phone]            Dec 20, 2017 11:30 AM CST   Return Visit with Gretel Quinn MD   [facility] ([facility])    [facility]  [address]  [place] [address]   [phone]            Dec 26, 2017 10:30 AM CST   Diabetic Education with  DIABETIC ED RESOURCE   [facility] ([facility])    [address] [id number] [phone]              Who to contact     If you have questions or need follow up information about today's clinic visit or your schedule please contact [facility] directly at [phone].  Normal or non-critical lab  "and imaging results will be communicated to you by MyChart, letter or phone within 4 business days after the clinic has received the results. If you do not hear from us within 7 days, please contact the clinic through [employer] or phone. If you have a critical or abnormal lab result, we will notify you by phone as soon as possible.  Submit refill requests through [employer] or call your pharmacy and they will forward the refill request to us. Please allow 3 business days for your refill to be completed.          Additional Information About Your Visit        [employer] Information     [employer] lets you send messages to your doctor, view your test results, renew your prescriptions, schedule appointments and more. To sign up, go to [email or web address] . Click on \"Log in\" on the left side of the screen, which will take you to the Welcome page. Then click on \"Sign up Now\" on the right side of the page.     You will be asked to enter the access code listed below, as well as some personal information. Please follow the directions to create your username and password.     Your access code is: [email or web address]  Expires: 2017  9:12 AM     Your access code will  in 90 days. If you need help or a new code, please call your [place] clinic or [phone].        Care EveryWhere ID     This is your Care EveryWhere ID. This could be used by other organizations to access your [place] medical records  [id number]        Your Vitals Were     Pulse Temperature Respirations Height Pulse Oximetry BMI (Body Mass Index)    80 97  F (36.1  C) (Oral) 16 5' 10\" (1.778 m) 98% 29.13 kg/m2       Blood Pressure from Last 3 Encounters:   17 124/62   17 160/62   17 110/50    Weight from Last 3 Encounters:   17 203 lb (92.1 kg)   17 204 lb (92.5 kg)   17 209 lb (94.8 kg)              We Performed the Following     UA with Microscopic reflex to Culture     Urine Culture Aerobic Bacterial          Today's " Medication Changes          These changes are accurate as of: 12/1/17  4:18 PM.  If you have any questions, ask your nurse or doctor.               Start taking these medicines.        Dose/Directions    ciprofloxacin 250 MG tablet   Commonly known as:  CIPRO   Used for:  Urinary tract infection without hematuria, site unspecified   Started by:  Sumit Duff DO        Dose:  250 mg   Take 1 tablet (250 mg) by mouth 2 times daily for 10 days   Quantity:  20 tablet   Refills:  0            Where to get your medicines      These medications were sent to [facility] [address] [address]     Phone:  [phone]     ciprofloxacin 250 MG tablet                Primary Care Provider Office Phone # Fax #    Dickson Reich [phone] [address] [facility] [address]        Equal Access to Services     SANDRA [facility] : Hadii aad ku hadasho Soomaali, waaxda luqadaha, qaybta kaalmada adeegyada, waxay idiin hayaan constantine kharacarrillo shaw . So [facility] [phone].    ATENCIÓN: Si habla español, tiene a nolan disposición servicios gratuitos de asistencia lingüística. Llame al [phone].    We comply with applicable federal civil rights laws and [place] laws. We do not discriminate on the basis of race, color, national origin, age, disability, sex, sexual orientation, or gender identity.            Thank you!     Thank you for choosing [facility]  for your care. Our goal is always to provide you with excellent care. Hearing back from our patients is one way we can continue to improve our services. Please take a few minutes to complete the written survey that you may receive in the mail after your visit with us. Thank you!             Your Updated Medication List - Protect others around you: Learn how to safely use, store and throw away your medicines at www.disposemymeds.org.          This list  is accurate as of: 12/1/17  4:18 PM.  Always use your most recent med list.                   Brand Name Dispense Instructions for use Diagnosis    acyclovir 400 MG tablet    ZOVIRAX    60 tablet    Take 1 tablet (400 mg) by mouth 2 times daily Viral Prophylaxis.    Multiple myeloma not having achieved remission (H)       amLODIPine 5 MG tablet    NORVASC    30 tablet    TAKE 1 TABLET BY MOUTH DAILY    Essential hypertension, benign       * B-D U/F 31G X 8 MM   Generic drug:  insulin pen needle           * B-D U/F 31G X 8 MM   Generic drug:  insulin pen needle     500 each    USE 5 PEN NEEDLES PER DAY OR AS DIRECTED    Type 2 diabetes mellitus with stage 3 chronic kidney disease, with long-term current use of insulin (H)       ciprofloxacin 250 MG tablet    CIPRO    20 tablet    Take 1 tablet (250 mg) by mouth 2 times daily for 10 days    Urinary tract infection without hematuria, site unspecified       * cyclophosphamide 50 MG capsule CHEMO    CYTOXAN    24 capsule    Take 6 capsules (300 mg) by mouth once a week    Multiple myeloma not having achieved remission (H)       * cyclophosphamide 50 MG capsule CHEMO    CYTOXAN    24 capsule    Take 6 capsules (300 mg) by mouth once a week    Multiple myeloma not having achieved remission (H)       * cyclophosphamide 50 MG capsule CHEMO    CYTOXAN    24 capsule    Take 6 capsules (300 mg) by mouth once a week    Multiple myeloma not having achieved remission (H)       * cyclophosphamide 50 MG capsule CHEMO    CYTOXAN    24 capsule    Take 6 capsules (300 mg) by mouth once a week    Multiple myeloma not having achieved remission (H)       dexamethasone 4 MG tablet    DECADRON    40 tablet    Take 10 tablets (40 mg) by mouth every 7 days for 4 doses Take daily on Days 1, 8, 15, and 22.    Multiple myeloma not having achieved remission (H)       ferrous sulfate 325 (65 FE) MG tablet    IRON     Take 325 mg by mouth daily (with breakfast)        FREESTYLE LITE test strip    Generic drug:  blood glucose monitoring     200 strip    USE 1 STRIP TO TEST TWICE DAILY.    Uncontrolled type 1 diabetes mellitus with stage 3 chronic kidney disease (H)       furosemide 20 MG tablet    LASIX    30 tablet    Take 1 tablet (20 mg) by mouth daily    Multiple myeloma not having achieved remission (H)       gemfibrozil 600 MG tablet    LOPID    180 tablet    TAKE 1 TABLET BY MOUTH TWICE DAILY    Hyperlipidemia       glipiZIDE 10 MG tablet    GLUCOTROL    180 tablet    TAKE 1 TABLET BY MOUTH TWICE DAILY BEFORE A MEAL    Type 2 diabetes mellitus with stage 3 chronic kidney disease, with long-term current use of insulin (H)       * insulin aspart 100 UNIT/ML injection    NovoLOG PEN    12 mL    Inject 11 units before breakfast, 10 units before lunch, 9 units before dinner, and 5 units at bedtime. Before meal correction: 140-175 add 1 unit  176-210 add 2 units  211-245 add 3 units  246-280 add 4 units  281-315 add 5 units 316-350 add 6 units  351-385 add 7 units 386-420 add 8 units  Over 420 add 9 units    Uncontrolled type 2 diabetes mellitus with hyperglycemia, unspecified long term insulin use status (H)       * NovoLOG FLEXPEN 100 UNIT/ML injection   Generic drug:  insulin aspart     15 mL    ADMINISTER 1 TO 5 UNITS UNDER THE SKIN FOUR TIMES DAILY WITH MEALS AND NIGHTLY    Uncontrolled type 2 diabetes mellitus with hyperglycemia, unspecified long term insulin use status (H)       insulin detemir 100 UNIT/ML injection    LEVEMIR FLEXPEN/FLEXTOUCH    15 mL    Inject 14 Units Subcutaneous every morning (before breakfast) On Wednesday and Thursday morning, inject 16 units.    Type 2 diabetes mellitus with diabetic chronic kidney disease, unspecified CKD stage, unspecified long term insulin use status (H)       levothyroxine 25 MCG tablet    SYNTHROID/LEVOTHROID    90 tablet    TAKE 1 TABLET BY MOUTH EVERY DAY    Acquired hypothyroidism       * lisinopril 30 MG tablet    PRINIVIL,ZESTRIL    90 tablet     TAKE 1 TABLET BY MOUTH EVERY DAY    Essential hypertension, benign       * lisinopril 30 MG tablet    PRINIVIL,ZESTRIL    90 tablet    Take 0.5 tablets (15 mg) by mouth daily    Essential hypertension, benign       metoprolol 25 MG tablet    LOPRESSOR    180 tablet    TAKE 1 TABLET BY MOUTH TWICE DAILY    Essential hypertension       omeprazole 20 MG CR capsule    priLOSEC    90 capsule    TAKE 1 CAPSULE BY MOUTH EVERY DAY    Congenital hiatus hernia       ondansetron 8 MG tablet    ZOFRAN    10 tablet    Take 1 tablet (8 mg) by mouth every 8 hours as needed (Nausea/Vomiting)    Multiple myeloma not having achieved remission (H)       polyethylene glycol powder    MIRALAX/GLYCOLAX          prochlorperazine 5 MG tablet    COMPAZINE    30 tablet    Take 1 tablet (5 mg) by mouth every 6 hours as needed (Nausea/Vomiting)    Multiple myeloma not having achieved remission (H)       Selenium 200 MCG Tabs      Take 100 mcg by mouth daily. Pt takes one half tab of the 200 mcg daily        tamsulosin 0.4 MG capsule    FLOMAX    90 capsule    TAKE 1 CAPSULE BY MOUTH DAILY    Malignant neoplasm of prostate (H)       VITAMIN C PO      Take 1,000 mg by mouth daily        VITAMIN D3 PO      Take 1,000 Units by mouth daily        * Notice:  This list has 10 medication(s) that are the same as other medications prescribed for you. Read the directions carefully, and ask your doctor or other care provider to review them with you.

## 2017-12-04 NOTE — NURSING NOTE
"Chief Complaint   Patient presents with     Hypertension       Initial /56  Pulse 80  Temp 99.5  F (37.5  C) (Tympanic)  Resp 16  Wt 197 lb (89.4 kg)  BMI 28.27 kg/m2 Estimated body mass index is 28.27 kg/(m^2) as calculated from the following:    Height as of 12/1/17: 5' 10\" (1.778 m).    Weight as of this encounter: 197 lb (89.4 kg).  Medication Reconciliation: complete     Mariia Allen CMA      "

## 2017-12-04 NOTE — MR AVS SNAPSHOT
After Visit Summary   12/4/2017    Roc Parkinson    MRN: [id number]           Patient Information     Date Of Birth          7/7/1926        Visit Information        Provider Department      12/4/2017 10:30 AM Dickson Reich MD [facility]        Today's Diagnoses     Essential hypertension, benign    -  1    Screening for diabetic peripheral neuropathy        Acute cystitis without hematuria        Type 2 diabetes mellitus with stage 3 chronic kidney disease, with long-term current use of insulin (H)        CKD (chronic kidney disease) stage 3, GFR 30-59 ml/min          Care Instructions    Will change antibiotic to nitrofurantoin.  His infection is resistant to the ciprofloxacin he has been on.  He is also not noticed any improvement.  Initially we are going to repeat his hemoglobin A1c and microalbumin but the lab did find a recent value.endocrinology so we canceled those tests.  His diabetic foot exam today is normal.  We will follow up on his bladder infection in 2 weeks.  I will see him back on the 13th for follow-up.  Will need a urinalysis at that time.  We'll also need his kidney function checked.  We did adjust his nitrofurantoin dose for his kidney function although it is just borderline abnormal.    The patient was interested in decreasing his long-acting insulin due to expense.  I did share with him that if we did that he would probably need an increase in his short acting insulin so it was a trade off.  He decided to stick with his current regimen.  His blood pressure today looked good.  I did not change any of his blood pressure medicines.          Follow-ups after your visit        Your next 10 appointments already scheduled     Dec 13, 2017 10:45 AM AGUSTINA   SHORT with Dickson Reich MD   [facility] ([facility])    [address] [place] [id number]  "  [phone]            Dec 20, 2017 11:00 AM CST   Level 1 with  INFUSION CHAIR 5   [facility] ([facility])    [facility] [place]  [address]  [place] [address]   [phone]            Dec 20, 2017 11:30 AM CST   Return Visit with Gretel Quinn MD   [facility] ([facility])    [facility] [place]  [address]  [place] [address]   [phone]            Dec 26, 2017 10:30 AM CST   Diabetic Education with  DIABETIC ED RESOURCE   [facility] ([facility])    [address] [address] [phone]              Who to contact     If you have questions or need follow up information about today's clinic visit or your schedule please contact [facility] directly at [phone].  Normal or non-critical lab and imaging results will be communicated to you by Gigi Hillhart, letter or phone within 4 business days after the clinic has received the results. If you do not hear from us within 7 days, please contact the clinic through Yieldrt or phone. If you have a critical or abnormal lab result, we will notify you by phone as soon as possible.  Submit refill requests through Foodist or call your pharmacy and they will forward the refill request to us. Please allow 3 business days for your refill to be completed.          Additional Information About Your Visit        Foodist Information     Foodist lets you send messages to your doctor, view your test results, renew your prescriptions, schedule appointments and more. To sign up, go to [email or web address] . Click on \"Log in\" on the left side of the screen, which will take you to the Welcome page. Then click on \"Sign up Now\" on the right side of the page.     You will be asked to enter the access code listed below, as well as some personal " information. Please follow the directions to create your username and password.     Your access code is: [email or web address]  Expires: 2017  9:12 AM     Your access code will  in 90 days. If you need help or a new code, please call your [facility] clinic or [phone].        Care EveryWhere ID     This is your Care EveryWhere ID. This could be used by other organizations to access your [facility] medical records  [id number]        Your Vitals Were     Pulse Temperature Respirations BMI (Body Mass Index)          80 99.5  F (37.5  C) (Tympanic) 16 28.27 kg/m2         Blood Pressure from Last 3 Encounters:   17 106/56   17 124/62   17 160/62    Weight from Last 3 Encounters:   17 197 lb (89.4 kg)   17 203 lb (92.1 kg)   17 204 lb (92.5 kg)              We Performed the Following     FOOT EXAM  NO CHARGE [61098.114]          Today's Medication Changes          These changes are accurate as of: 17 11:59 PM.  If you have any questions, ask your nurse or doctor.               Start taking these medicines.        Dose/Directions    nitroFURantoin (macrocrystal-monohydrate) 100 MG capsule   Commonly known as:  MACROBID   Used for:  Acute cystitis without hematuria   Started by:  Dickson Reich MD        Dose:  100 mg   Take 1 capsule (100 mg) by mouth daily   Quantity:  14 capsule   Refills:  0         Stop taking these medicines if you haven't already. Please contact your care team if you have questions.     ciprofloxacin 250 MG tablet   Commonly known as:  CIPRO   Stopped by:  Dickson Reich MD                Where to get your medicines      These medications were sent to [id number] - [place], [place] - [address] AT [facility] & [address] [facility] [address]    Hours:  24-hours Phone:  [phone]     nitroFURantoin (macrocrystal-monohydrate) 100 MG capsule                Primary Care Provider Office Phone # Faw  #    Dickson Reich [phone]       [address] [facility] [address]        Equal Access to Services     ELIE GAGNON : Hadii aad ku hademelyvincenzo Somaeali, waemoryda luqadaha, qarafyta kaalmada paddy, melita carmelin hayaajanny lewsadie jacobson colin rodriguez. So [facility] [phone].    ATENCIÓN: Si habla español, tiene a nolan disposición servicios gratuitos de asistencia lingüística. Llame al [phone].    We comply with applicable federal civil rights laws and [place] laws. We do not discriminate on the basis of race, color, national origin, age, disability, sex, sexual orientation, or gender identity.            Thank you!     Thank you for choosing [facility]  for your care. Our goal is always to provide you with excellent care. Hearing back from our patients is one way we can continue to improve our services. Please take a few minutes to complete the written survey that you may receive in the mail after your visit with us. Thank you!             Your Updated Medication List - Protect others around you: Learn how to safely use, store and throw away your medicines at www.disposemymeds.org.          This list is accurate as of: 12/4/17 11:59 PM.  Always use your most recent med list.                   Brand Name Dispense Instructions for use Diagnosis    acyclovir 400 MG tablet    ZOVIRAX    60 tablet    Take 1 tablet (400 mg) by mouth 2 times daily Viral Prophylaxis.    Multiple myeloma not having achieved remission (H)       amLODIPine 5 MG tablet    NORVASC    30 tablet    TAKE 1 TABLET BY MOUTH DAILY    Essential hypertension, benign       * B-D U/F 31G X 8 MM   Generic drug:  insulin pen needle           * B-D U/F 31G X 8 MM   Generic drug:  insulin pen needle     500 each    USE 5 PEN NEEDLES PER DAY OR AS DIRECTED    Type 2 diabetes mellitus with stage 3 chronic kidney disease, with long-term current use of insulin (H)       * cyclophosphamide 50 MG capsule CHEMO     CYTOXAN    24 capsule    Take 6 capsules (300 mg) by mouth once a week    Multiple myeloma not having achieved remission (H)       * cyclophosphamide 50 MG capsule CHEMO    CYTOXAN    24 capsule    Take 6 capsules (300 mg) by mouth once a week    Multiple myeloma not having achieved remission (H)       * cyclophosphamide 50 MG capsule CHEMO    CYTOXAN    24 capsule    Take 6 capsules (300 mg) by mouth once a week    Multiple myeloma not having achieved remission (H)       * cyclophosphamide 50 MG capsule CHEMO    CYTOXAN    24 capsule    Take 6 capsules (300 mg) by mouth once a week    Multiple myeloma not having achieved remission (H)       dexamethasone 4 MG tablet    DECADRON    40 tablet    Take 10 tablets (40 mg) by mouth every 7 days for 4 doses Take daily on Days 1, 8, 15, and 22.    Multiple myeloma not having achieved remission (H)       ferrous sulfate 325 (65 FE) MG tablet    IRON     Take 325 mg by mouth daily (with breakfast)        FREESTYLE LITE test strip   Generic drug:  blood glucose monitoring     200 strip    USE 1 STRIP TO TEST TWICE DAILY.    Uncontrolled type 1 diabetes mellitus with stage 3 chronic kidney disease (H)       furosemide 20 MG tablet    LASIX    30 tablet    Take 1 tablet (20 mg) by mouth daily    Multiple myeloma not having achieved remission (H)       gemfibrozil 600 MG tablet    LOPID    180 tablet    TAKE 1 TABLET BY MOUTH TWICE DAILY    Hyperlipidemia       glipiZIDE 10 MG tablet    GLUCOTROL    180 tablet    TAKE 1 TABLET BY MOUTH TWICE DAILY BEFORE A MEAL    Type 2 diabetes mellitus with stage 3 chronic kidney disease, with long-term current use of insulin (H)       * insulin aspart 100 UNIT/ML injection    NovoLOG PEN    12 mL    Inject 11 units before breakfast, 10 units before lunch, 9 units before dinner, and 5 units at bedtime. Before meal correction: 140-175 add 1 unit  176-210 add 2 units  211-245 add 3 units  246-280 add 4 units  281-315 add 5 units 316-350 add 6  units  351-385 add 7 units 386-420 add 8 units  Over 420 add 9 units    Uncontrolled type 2 diabetes mellitus with hyperglycemia, unspecified long term insulin use status (H)       * NovoLOG FLEXPEN 100 UNIT/ML injection   Generic drug:  insulin aspart     15 mL    ADMINISTER 1 TO 5 UNITS UNDER THE SKIN FOUR TIMES DAILY WITH MEALS AND NIGHTLY    Uncontrolled type 2 diabetes mellitus with hyperglycemia, unspecified long term insulin use status (H)       insulin detemir 100 UNIT/ML injection    LEVEMIR FLEXPEN/FLEXTOUCH    15 mL    Inject 14 Units Subcutaneous every morning (before breakfast) On Wednesday and Thursday morning, inject 16 units.    Type 2 diabetes mellitus with diabetic chronic kidney disease, unspecified CKD stage, unspecified long term insulin use status (H)       levothyroxine 25 MCG tablet    SYNTHROID/LEVOTHROID    90 tablet    TAKE 1 TABLET BY MOUTH EVERY DAY    Acquired hypothyroidism       * lisinopril 30 MG tablet    PRINIVIL,ZESTRIL    90 tablet    TAKE 1 TABLET BY MOUTH EVERY DAY    Essential hypertension, benign       * lisinopril 30 MG tablet    PRINIVIL,ZESTRIL    90 tablet    Take 0.5 tablets (15 mg) by mouth daily    Essential hypertension, benign       metoprolol 25 MG tablet    LOPRESSOR    180 tablet    TAKE 1 TABLET BY MOUTH TWICE DAILY    Essential hypertension       nitroFURantoin (macrocrystal-monohydrate) 100 MG capsule    MACROBID    14 capsule    Take 1 capsule (100 mg) by mouth daily    Acute cystitis without hematuria       omeprazole 20 MG CR capsule    priLOSEC    90 capsule    TAKE 1 CAPSULE BY MOUTH EVERY DAY    Congenital hiatus hernia       ondansetron 8 MG tablet    ZOFRAN    10 tablet    Take 1 tablet (8 mg) by mouth every 8 hours as needed (Nausea/Vomiting)    Multiple myeloma not having achieved remission (H)       polyethylene glycol powder    MIRALAX/GLYCOLAX          prochlorperazine 5 MG tablet    COMPAZINE    30 tablet    Take 1 tablet (5 mg) by mouth every 6  hours as needed (Nausea/Vomiting)    Multiple myeloma not having achieved remission (H)       Selenium 200 MCG Tabs      Take 100 mcg by mouth daily. Pt takes one half tab of the 200 mcg daily        tamsulosin 0.4 MG capsule    FLOMAX    90 capsule    TAKE 1 CAPSULE BY MOUTH DAILY    Malignant neoplasm of prostate (H)       VITAMIN C PO      Take 1,000 mg by mouth daily        VITAMIN D3 PO      Take 1,000 Units by mouth daily        * Notice:  This list has 10 medication(s) that are the same as other medications prescribed for you. Read the directions carefully, and ask your doctor or other care provider to review them with you.

## 2017-12-04 NOTE — PROGRESS NOTES
SUBJECTIVE:   Roc Parkinson is a 89 year old male who presents to clinic today for the following health issues:      Hypertension Follow-up      Outpatient blood pressures are being checked at home.  Results are 133-136/76.    Low Salt Diet: no added salt        Amount of exercise or physical activity: None    Problems taking medications regularly: No    Medication side effects: none    Diet: low salt        Genitourinary symptoms      Duration: 4 days    Description:  dysuria and frequency    Intensity:  moderate    Accompanying signs and symptoms (fever/discharge/nausea/vomiting/back or abdominal pain):  None    History (frequent UTI's/kidney stones/prostate problems): recurrent UTI's  Sexually active: no     Precipitating or alleviating factors: None    Therapies tried and outcome: course of antibiotics - on cipro which is resistant   Outcome: not working        Problem list and histories reviewed & adjusted, as indicated.  Additional history: as documented    Patient Active Problem List   Diagnosis     Dysphagia     Malignant neoplasm of prostate (H)     Malignant neoplasm of bladder (H)     Essential hypertension, benign     Asymptomatic varicose veins     Congenital hiatus hernia     Oral lesion     CTS (carpal tunnel syndrome)     Irritable bowel syndrome     Vitamin D deficiency disease     Microalbuminuria     Obesity     UTI (urinary tract infection) w hx of recurrence     Iron deficiency anemia     Nonsmoker     A-fib (H)     Health Care Home     Hyperlipidemia     CKD (chronic kidney disease) stage 3, GFR 30-59 ml/min     Hypothyroidism     Long-term (current) use of anticoagulants [Z79.01]     Macrocytic anemia     GIB (gastrointestinal bleeding)     Multiple myeloma not having achieved remission (H)     Hypercalcemia     Hyperglycemia     Urinary tract infection     Hyperkalemia     ACP (advance care planning)     MDS (myelodysplastic syndrome) (H)     Chemotherapy-induced neutropenia (H)      Anemia in neoplastic disease     Type 2 diabetes mellitus with stage 3 chronic kidney disease, with long-term current use of insulin (H)     Past Surgical History:   Procedure Laterality Date     ARTHROPLASTY KNEE  11/5/2012    Procedure: ARTHROPLASTY KNEE;  RIGHT TOTAL KNEE ARTHROPLASTY (SMITH & NEPHEW)^;  Surgeon: Dickson Schulte MD;  Location:  OR     BIOPSY      bladder     COLONOSCOPY  2007     COLONOSCOPY N/A 11/15/2016    Procedure: COMBINED COLONOSCOPY, SINGLE OR MULTIPLE BIOPSY/POLYPECTOMY BY BIOPSY;  Surgeon: Kenneth Fulton MD;  Location:  GI     GENITOURINARY SURGERY      TURP x2 and bladder scraping     GI SURGERY      anal fistula     ORTHOPEDIC SURGERY      lt knee in nov.2009     PHACOEMULSIFICATION CLEAR CORNEA WITH STANDARD INTRAOCULAR LENS IMPLANT Left 10/25/2017    Procedure: PHACOEMULSIFICATION CLEAR CORNEA WITH STANDARD INTRAOCULAR LENS IMPLANT;  LEFT EYE PHACOEMULSIFICATION CLEAR CORNEA WITH STANDARD INTRAOCULAR LENS IMPLANT ;  Surgeon: Shady Haider MD;  Location:  EC     PHACOEMULSIFICATION CLEAR CORNEA WITH STANDARD INTRAOCULAR LENS IMPLANT Right 11/1/2017    Procedure: PHACOEMULSIFICATION CLEAR CORNEA WITH STANDARD INTRAOCULAR LENS IMPLANT;  RIGHT EYE PHACOEMULSIFICATION CLEAR CORNEA WITH STANDARD INTRAOCULAR LENS IMPLANT;  Surgeon: Shady Haider MD;  Location:  EC     SOFT TISSUE SURGERY      melanoma       Social History   Substance Use Topics     Smoking status: Never Smoker     Smokeless tobacco: Never Used     Alcohol use No     Family History   Problem Relation Age of Onset     Cardiovascular Father 81     heart arrythmia     Endocrine Disease Brother 74     Paget's             Reviewed and updated as needed this visit by clinical staffTobacco  Allergies  Meds  Med Hx  Surg Hx  Fam Hx  Soc Hx      Reviewed and updated as needed this visit by Provider         ROS:  Constitutional, HEENT, cardiovascular, pulmonary, gi and gu systems are negative, except  as otherwise noted.  : positive for, dysuria and frequency      OBJECTIVE:                                                    /56  Pulse 80  Temp 99.5  F (37.5  C) (Tympanic)  Resp 16  Wt 197 lb (89.4 kg)  BMI 28.27 kg/m2  Body mass index is 28.27 kg/(m^2).  GENERAL APPEARANCE: healthy, alert and no distress    Diagnostic test results:  Results for orders placed or performed in visit on 12/01/17   UA with Microscopic reflex to Culture   Result Value Ref Range    Color Urine Yellow     Appearance Urine Cloudy     Glucose Urine Negative NEG^Negative mg/dL    Bilirubin Urine Negative NEG^Negative    Ketones Urine 15 (A) NEG^Negative mg/dL    Specific Gravity Urine 1.025 1.003 - 1.035    pH Urine 5.5 5.0 - 7.0 pH    Protein Albumin Urine Trace (A) NEG^Negative mg/dL    Urobilinogen Urine 0.2 0.2 - 1.0 EU/dL    Nitrite Urine Negative NEG^Negative    Blood Urine Negative NEG^Negative    Leukocyte Esterase Urine Moderate (A) NEG^Negative    Source Midstream Urine     WBC Urine  (A) OTO2^O - 2 /HPF    RBC Urine 10-25 (A) OTO2^O - 2 /HPF    Squamous Epithelial /LPF Urine Moderate (A) FEW^Few /LPF    Bacteria Urine Many (A) NEG^Negative /HPF   Urine Culture Aerobic Bacterial   Result Value Ref Range    Specimen Description Midstream Urine     Culture Micro (A)      50,000 to 100,000 colonies/mL  Escherichia coli ESBL      Culture Micro (A)      10,000 to 50,000 colonies/mL  Escherichia coli ESBL      Culture Micro <10,000 colonies/mL  urogenital janneth       Culture Micro       ESBL (extended beta lactamase) producing organisms require contact precautions.       Susceptibility    Escherichia coli esbl - SAMI     AMPICILLIN >=32 Resistant ug/mL     CEFAZOLIN* >=64 Resistant ug/mL      * Cefazolin SAMI breakpoints are for the treatment of uncomplicated urinary tract infections.  For the treatment of systemic infections, please contact the laboratory for additional testing.     CEFOXITIN 16 Intermediate ug/mL      CEFTAZIDIME  Resistant ug/mL     CEFTRIAXONE >=64 Resistant ug/mL     CIPROFLOXACIN >=4 Resistant ug/mL     GENTAMICIN <=1 Sensitive ug/mL     LEVOFLOXACIN >=8 Resistant ug/mL     NITROFURANTOIN <=16 Sensitive ug/mL     TOBRAMYCIN <=1 Sensitive ug/mL     Trimethoprim/Sulfa >=16/304 Resistant ug/mL     AMPICILLIN/SULBACTAM >=32 Resistant ug/mL     Piperacillin/Tazo <=4 Sensitive ug/mL     AMIKACIN <=2 Sensitive ug/mL     CEFEPIME  Resistant ug/mL     MEROPENEM <=0.25 Sensitive ug/mL     ERTAPENEM 0.012 Sensitive ug/mL    Escherichia coli esbl - SAMI     AMPICILLIN >=32 Resistant ug/mL     CEFAZOLIN* >=64 Resistant ug/mL      * Cefazolin SAMI breakpoints are for the treatment of uncomplicated urinary tract infections.  For the treatment of systemic infections, please contact the laboratory for additional testing.Cefazolin SAMI breakpoints are for the treatment of uncomplicated urinary tract infections.  For the treatment of systemic infections, please contact the laboratory for additional testing.     CEFOXITIN 16 Intermediate ug/mL     CEFTAZIDIME  Resistant ug/mL     CEFTRIAXONE >=64 Resistant ug/mL     CIPROFLOXACIN >=4 Resistant ug/mL     GENTAMICIN <=1 Sensitive ug/mL     LEVOFLOXACIN >=8 Resistant ug/mL     NITROFURANTOIN <=16 Sensitive ug/mL     TOBRAMYCIN <=1 Sensitive ug/mL     Trimethoprim/Sulfa >=16/304 Resistant ug/mL     AMPICILLIN/SULBACTAM >=32 Resistant ug/mL     Piperacillin/Tazo <=4 Sensitive ug/mL     AMIKACIN <=2 Sensitive ug/mL     CEFEPIME >=64 Resistant ug/mL     MEROPENEM <=0.25 Sensitive ug/mL     ERTAPENEM 0.012 Sensitive ug/mL          ASSESSMENT/PLAN:                                                      ICD-10-CM    1. Essential hypertension, benign I10    2. Screening for diabetic peripheral neuropathy Z13.89 FOOT EXAM  NO CHARGE [00373.114]   3. Acute cystitis without hematuria N30.00 nitroFURantoin, macrocrystal-monohydrate, (MACROBID) 100 MG capsule   4. Type 2 diabetes mellitus with stage  3 chronic kidney disease, with long-term current use of insulin (H) E11.22 CANCELED: HEMOGLOBIN A1C    N18.3 CANCELED: Albumin Random Urine Quantitative with Creat Ratio    Z79.4    5. CKD (chronic kidney disease) stage 3, GFR 30-59 ml/min N18.3        Patient Instructions   Will change antibiotic to nitrofurantoin.  His infection is resistant to the ciprofloxacin he has been on.  He is also not noticed any improvement.  Initially we are going to repeat his hemoglobin A1c and microalbumin but the lab did find a recent value.endocrinology so we canceled those tests.  His diabetic foot exam today is normal.  We will follow up on his bladder infection in 2 weeks.  I will see him back on the 13th for follow-up.  Will need a urinalysis at that time.  We'll also need his kidney function checked.  We did adjust his nitrofurantoin dose for his kidney function although it is just borderline abnormal.    The patient was interested in decreasing his long-acting insulin due to expense.  I did share with him that if we did that he would probably need an increase in his short acting insulin so it was a trade off.  He decided to stick with his current regimen.  His blood pressure today looked good.  I did not change any of his blood pressure medicines.      Dickson Reich MD  [facility]

## 2017-12-05 NOTE — PATIENT INSTRUCTIONS
Will change antibiotic to nitrofurantoin.  His infection is resistant to the ciprofloxacin he has been on.  He is also not noticed any improvement.  Initially we are going to repeat his hemoglobin A1c and microalbumin but the lab did find a recent value.endocrinology so we canceled those tests.  His diabetic foot exam today is normal.  We will follow up on his bladder infection in 2 weeks.  I will see him back on the 13th for follow-up.  Will need a urinalysis at that time.  We'll also need his kidney function checked.  We did adjust his nitrofurantoin dose for his kidney function although it is just borderline abnormal.    The patient was interested in decreasing his long-acting insulin due to expense.  I did share with him that if we did that he would probably need an increase in his short acting insulin so it was a trade off.  He decided to stick with his current regimen.  His blood pressure today looked good.  I did not change any of his blood pressure medicines.

## 2017-12-13 NOTE — NURSING NOTE
"Chief Complaint   Patient presents with     Urinary Problem     UTI follow up     Cerumen (impacted)       Initial /60  Pulse 80  Temp 98  F (36.7  C) (Tympanic)  Resp 16  Wt 200 lb (90.7 kg)  BMI 28.7 kg/m2 Estimated body mass index is 28.7 kg/(m^2) as calculated from the following:    Height as of 12/1/17: 5' 10\" (1.778 m).    Weight as of this encounter: 200 lb (90.7 kg).  Medication Reconciliation: complete     Mariia Allen CMA      "

## 2017-12-13 NOTE — MR AVS SNAPSHOT
After Visit Summary   12/13/2017    Roc Parkinson    MRN: [id number]           Patient Information     Date Of Birth          7/7/1926        Visit Information        Provider Department      12/13/2017 10:45 AM Dickson [facility] MD [facility]        Today's Diagnoses     Dysuria    -  1    Nonspecific finding on examination of urine        Acute cystitis without hematuria          Care Instructions    I will place the patient on another 10 day course of Macrodantin 100 mg twice daily.  His kidney function has been a little bit borderline on his last test but prior to that had been fine.  He will finish his 10 day course of Macrodantin and if he is completely asymptomatic he will follow-up in January for his already scheduled physical.  If he has any persistent symptoms after finishing the 10 day course of antibiotics she will return sooner shortly after [place].          Follow-ups after your visit        Your next 10 appointments already scheduled     Dec 20, 2017 11:00 AM CST   Level 1 with  INFUSION CHAIR 5   [facility] ([facility])    [facility]  [address]  [facility] [address]   [phone]            Dec 20, 2017 11:30 AM CST   Return Visit with Gretel Quinn MD   [facility] ([facility])    [facility]  [address]  [facility] [address]   [phone]            Dec 26, 2017 10:30 AM CST   Diabetic Education with  DIABETIC ED RESOURCE   [facility] ([facility])    [address] [address]   [phone]            Jan 08, 2018  8:30 AM CST   PHYSICAL with Dickson Reich MD   [facility] ([facility])    [address] [place] [address]  "  [phone]              Who to contact     If you have questions or need follow up information about today's clinic visit or your schedule please contact [facility] directly at [phone].  Normal or non-critical lab and imaging results will be communicated to you by MyChart, letter or phone within 4 business days after the clinic has received the results. If you do not hear from us within 7 days, please contact the clinic through MyChart or phone. If you have a critical or abnormal lab result, we will notify you by phone as soon as possible.  Submit refill requests through [employer] or call your pharmacy and they will forward the refill request to us. Please allow 3 business days for your refill to be completed.          Additional Information About Your Visit        CENTERSONIChart Information     [employer] lets you send messages to your doctor, view your test results, renew your prescriptions, schedule appointments and more. To sign up, go to [email or web address] . Click on \"Log in\" on the left side of the screen, which will take you to the Welcome page. Then click on \"Sign up Now\" on the right side of the page.     You will be asked to enter the access code listed below, as well as some personal information. Please follow the directions to create your username and password.     Your access code is: [email or web address]  Expires: 2017  9:12 AM     Your access code will  in 90 days. If you need help or a new code, please call your [facility] or [phone].        Care EveryWhere ID     This is your Care EveryWhere ID. This could be used by other organizations to access your [place] medical records  [id number]        Your Vitals Were     Pulse Temperature Respirations BMI (Body Mass Index)          80 98  F (36.7  C) (Tympanic) 16 28.7 kg/m2         Blood Pressure from Last 3 Encounters:   17 120/60   17 106/56   17 124/62    Weight from Last 3 Encounters: "   12/13/17 200 lb (90.7 kg)   12/04/17 197 lb (89.4 kg)   12/01/17 203 lb (92.1 kg)              We Performed the Following     UA with Microscopic reflex to Culture     Urine Culture Aerobic Bacterial          Today's Medication Changes          These changes are accurate as of: 12/13/17  1:36 PM.  If you have any questions, ask your nurse or doctor.               These medicines have changed or have updated prescriptions.        Dose/Directions    nitroFURantoin (macrocrystal-monohydrate) 100 MG capsule   Commonly known as:  MACROBID   This may have changed:  when to take this   Used for:  Acute cystitis without hematuria   Changed by:  Dickson Reich MD        Dose:  100 mg   Take 1 capsule (100 mg) by mouth 2 times daily for 10 days   Quantity:  20 capsule   Refills:  0            Where to get your medicines      These medications were sent to [facility] [id number] [place] [address] [address] AT [facility]  [address], [facility] [address]    Hours:  24-hours Phone:  [phone]     nitroFURantoin (macrocrystal-monohydrate) 100 MG capsule                Primary Care Provider Office Phone # Fax #    Dickson Reich [phone] [address] [facility] [address]        Equal Access to Services     ELIE GAGNON AH: Hadii aad ku hadasho Soomaali, waaxda luqadaha, qaybta kaalmada adeegyada, melita núñez haymarge rodriguez. So [facility] [phone].    ATENCIÓN: Si habla español, tiene a nolan disposición servicios gratuitos de asistencia lingüística. Llame al [phone].    We comply with applicable federal civil rights laws and [place] laws. We do not discriminate on the basis of race, color, national origin, age, disability, sex, sexual orientation, or gender identity.            Thank you!     Thank you for choosing [facility]  for your care. Our goal is always to provide you with excellent care.  Hearing back from our patients is one way we can continue to improve our services. Please take a few minutes to complete the written survey that you may receive in the mail after your visit with us. Thank you!             Your Updated Medication List - Protect others around you: Learn how to safely use, store and throw away your medicines at www.disposemymeds.org.          This list is accurate as of: 12/13/17  1:36 PM.  Always use your most recent med list.                   Brand Name Dispense Instructions for use Diagnosis    acyclovir 400 MG tablet    ZOVIRAX    60 tablet    Take 1 tablet (400 mg) by mouth 2 times daily Viral Prophylaxis.    Multiple myeloma not having achieved remission (H)       amLODIPine 5 MG tablet    NORVASC    30 tablet    TAKE 1 TABLET BY MOUTH DAILY    Essential hypertension, benign       * B-D U/F 31G X 8 MM   Generic drug:  insulin pen needle           * B-D U/F 31G X 8 MM   Generic drug:  insulin pen needle     500 each    USE 5 PEN NEEDLES PER DAY OR AS DIRECTED    Type 2 diabetes mellitus with stage 3 chronic kidney disease, with long-term current use of insulin (H)       * cyclophosphamide 50 MG capsule CHEMO    CYTOXAN    24 capsule    Take 6 capsules (300 mg) by mouth once a week    Multiple myeloma not having achieved remission (H)       * cyclophosphamide 50 MG capsule CHEMO    CYTOXAN    24 capsule    Take 6 capsules (300 mg) by mouth once a week    Multiple myeloma not having achieved remission (H)       * cyclophosphamide 50 MG capsule CHEMO    CYTOXAN    24 capsule    Take 6 capsules (300 mg) by mouth once a week    Multiple myeloma not having achieved remission (H)       * cyclophosphamide 50 MG capsule CHEMO    CYTOXAN    24 capsule    Take 6 capsules (300 mg) by mouth once a week    Multiple myeloma not having achieved remission (H)       dexamethasone 4 MG tablet    DECADRON    40 tablet    Take 10 tablets (40 mg) by mouth every 7 days for 4 doses Take daily on Days 1,  8, 15, and 22.    Multiple myeloma not having achieved remission (H)       ferrous sulfate 325 (65 FE) MG tablet    IRON     Take 325 mg by mouth daily (with breakfast)        FREESTYLE LITE test strip   Generic drug:  blood glucose monitoring     200 strip    USE 1 STRIP TO TEST TWICE DAILY.    Uncontrolled type 1 diabetes mellitus with stage 3 chronic kidney disease (H)       furosemide 20 MG tablet    LASIX    30 tablet    Take 1 tablet (20 mg) by mouth daily    Multiple myeloma not having achieved remission (H)       gemfibrozil 600 MG tablet    LOPID    180 tablet    TAKE 1 TABLET BY MOUTH TWICE DAILY    Hyperlipidemia       glipiZIDE 10 MG tablet    GLUCOTROL    180 tablet    TAKE 1 TABLET BY MOUTH TWICE DAILY BEFORE A MEAL    Type 2 diabetes mellitus with stage 3 chronic kidney disease, with long-term current use of insulin (H)       * insulin aspart 100 UNIT/ML injection    NovoLOG PEN    12 mL    Inject 11 units before breakfast, 10 units before lunch, 9 units before dinner, and 5 units at bedtime. Before meal correction: 140-175 add 1 unit  176-210 add 2 units  211-245 add 3 units  246-280 add 4 units  281-315 add 5 units 316-350 add 6 units  351-385 add 7 units 386-420 add 8 units  Over 420 add 9 units    Uncontrolled type 2 diabetes mellitus with hyperglycemia, unspecified long term insulin use status (H)       * NovoLOG FLEXPEN 100 UNIT/ML injection   Generic drug:  insulin aspart     15 mL    ADMINISTER 1 TO 5 UNITS UNDER THE SKIN FOUR TIMES DAILY WITH MEALS AND NIGHTLY    Uncontrolled type 2 diabetes mellitus with hyperglycemia, unspecified long term insulin use status (H)       insulin detemir 100 UNIT/ML injection    LEVEMIR FLEXPEN/FLEXTOUCH    15 mL    Inject 14 Units Subcutaneous every morning (before breakfast) On Wednesday and Thursday morning, inject 16 units.    Type 2 diabetes mellitus with diabetic chronic kidney disease, unspecified CKD stage, unspecified long term insulin use status (H)        levothyroxine 25 MCG tablet    SYNTHROID/LEVOTHROID    90 tablet    TAKE 1 TABLET BY MOUTH EVERY DAY    Acquired hypothyroidism       * lisinopril 30 MG tablet    PRINIVIL,ZESTRIL    90 tablet    TAKE 1 TABLET BY MOUTH EVERY DAY    Essential hypertension, benign       * lisinopril 30 MG tablet    PRINIVIL,ZESTRIL    90 tablet    Take 0.5 tablets (15 mg) by mouth daily    Essential hypertension, benign       metoprolol 25 MG tablet    LOPRESSOR    180 tablet    TAKE 1 TABLET BY MOUTH TWICE DAILY    Essential hypertension       nitroFURantoin (macrocrystal-monohydrate) 100 MG capsule    MACROBID    20 capsule    Take 1 capsule (100 mg) by mouth 2 times daily for 10 days    Acute cystitis without hematuria       omeprazole 20 MG CR capsule    priLOSEC    90 capsule    TAKE 1 CAPSULE BY MOUTH EVERY DAY    Congenital hiatus hernia       ondansetron 8 MG tablet    ZOFRAN    10 tablet    Take 1 tablet (8 mg) by mouth every 8 hours as needed (Nausea/Vomiting)    Multiple myeloma not having achieved remission (H)       polyethylene glycol powder    MIRALAX/GLYCOLAX          prochlorperazine 5 MG tablet    COMPAZINE    30 tablet    Take 1 tablet (5 mg) by mouth every 6 hours as needed (Nausea/Vomiting)    Multiple myeloma not having achieved remission (H)       Selenium 200 MCG Tabs      Take 100 mcg by mouth daily. Pt takes one half tab of the 200 mcg daily        tamsulosin 0.4 MG capsule    FLOMAX    90 capsule    TAKE 1 CAPSULE BY MOUTH DAILY    Malignant neoplasm of prostate (H)       VITAMIN C PO      Take 1,000 mg by mouth daily        VITAMIN D3 PO      Take 1,000 Units by mouth daily        * Notice:  This list has 10 medication(s) that are the same as other medications prescribed for you. Read the directions carefully, and ask your doctor or other care provider to review them with you.

## 2017-12-13 NOTE — PROGRESS NOTES
"  SUBJECTIVE:   Roc Parkinson is a 89 year old male who presents to clinic today for the following health issues:      Genitourinary symptoms      Duration: dx on 12/1/17- f/u today    Description:  dysuria    Intensity:  mild    Accompanying signs and symptoms (fever/discharge/nausea/vomiting/back or abdominal pain):  None    History (frequent UTI's/kidney stones/prostate problems): yes  Sexually active: no     Precipitating or alleviating factors: None  Therapies tried and outcome: course of antibiotics - macrobid   Outcome: \"feeling a little better but not 100%\" still has dysuria, frequency has improved          Problem list and histories reviewed & adjusted, as indicated.  Additional history: as documented    Patient Active Problem List   Diagnosis     Dysphagia     Malignant neoplasm of prostate (H)     Malignant neoplasm of bladder (H)     Essential hypertension, benign     Asymptomatic varicose veins     Congenital hiatus hernia     Oral lesion     CTS (carpal tunnel syndrome)     Irritable bowel syndrome     Vitamin D deficiency disease     Microalbuminuria     Obesity     UTI (urinary tract infection) w hx of recurrence     Iron deficiency anemia     Nonsmoker     A-fib (H)     Health Care Home     Hyperlipidemia     CKD (chronic kidney disease) stage 3, GFR 30-59 ml/min     Hypothyroidism     Long-term (current) use of anticoagulants [Z79.01]     Macrocytic anemia     GIB (gastrointestinal bleeding)     Multiple myeloma not having achieved remission (H)     Hypercalcemia     Hyperglycemia     Urinary tract infection     Hyperkalemia     ACP (advance care planning)     MDS (myelodysplastic syndrome) (H)     Chemotherapy-induced neutropenia (H)     Anemia in neoplastic disease     Type 2 diabetes mellitus with stage 3 chronic kidney disease, with long-term current use of insulin (H)     Past Surgical History:   Procedure Laterality Date     ARTHROPLASTY KNEE  11/5/2012    Procedure: ARTHROPLASTY KNEE;  " RIGHT TOTAL KNEE ARTHROPLASTY (SMITH & NEPHEW)^;  Surgeon: Dickson Schulte MD;  Location:  OR     BIOPSY      bladder     COLONOSCOPY  2007     COLONOSCOPY N/A 11/15/2016    Procedure: COMBINED COLONOSCOPY, SINGLE OR MULTIPLE BIOPSY/POLYPECTOMY BY BIOPSY;  Surgeon: Kenneth Fulton MD;  Location:  GI     GENITOURINARY SURGERY      TURP x2 and bladder scraping     GI SURGERY      anal fistula     ORTHOPEDIC SURGERY      lt knee in nov.2009     PHACOEMULSIFICATION CLEAR CORNEA WITH STANDARD INTRAOCULAR LENS IMPLANT Left 10/25/2017    Procedure: PHACOEMULSIFICATION CLEAR CORNEA WITH STANDARD INTRAOCULAR LENS IMPLANT;  LEFT EYE PHACOEMULSIFICATION CLEAR CORNEA WITH STANDARD INTRAOCULAR LENS IMPLANT ;  Surgeon: Shady Haider MD;  Location:  EC     PHACOEMULSIFICATION CLEAR CORNEA WITH STANDARD INTRAOCULAR LENS IMPLANT Right 11/1/2017    Procedure: PHACOEMULSIFICATION CLEAR CORNEA WITH STANDARD INTRAOCULAR LENS IMPLANT;  RIGHT EYE PHACOEMULSIFICATION CLEAR CORNEA WITH STANDARD INTRAOCULAR LENS IMPLANT;  Surgeon: Shady Haider MD;  Location:  EC     SOFT TISSUE SURGERY      melanoma       Social History   Substance Use Topics     Smoking status: Never Smoker     Smokeless tobacco: Never Used     Alcohol use No     Family History   Problem Relation Age of Onset     Cardiovascular Father 81     heart arrythmia     Endocrine Disease Brother 74     Paget's             Reviewed and updated as needed this visit by clinical staffTobacco  Allergies  Meds  Med Hx  Surg Hx  Fam Hx  Soc Hx      Reviewed and updated as needed this visit by Provider         ROS:  Constitutional, neuro, ENT, endocrine, pulmonary, cardiac, gastrointestinal, genitourinary, musculoskeletal, integument and psychiatric systems are negative, except as otherwise noted.      OBJECTIVE:                                                    /60  Pulse 80  Temp 98  F (36.7  C) (Tympanic)  Resp 16  Wt 200 lb (90.7 kg)  BMI 28.7  kg/m2  Body mass index is 28.7 kg/(m^2).  GENERAL APPEARANCE: healthy, alert and no distress   (male): no flank or CVA discomfort    Diagnostic test results:  Results for orders placed or performed in visit on 12/13/17 (from the past 24 hour(s))   UA with Microscopic reflex to Culture   Result Value Ref Range    Color Urine Yellow     Appearance Urine Clear     Glucose Urine Negative NEG^Negative mg/dL    Bilirubin Urine Negative NEG^Negative    Ketones Urine Negative NEG^Negative mg/dL    Specific Gravity Urine 1.015 1.003 - 1.035    pH Urine 5.5 5.0 - 7.0 pH    Protein Albumin Urine Negative NEG^Negative mg/dL    Urobilinogen Urine 0.2 0.2 - 1.0 EU/dL    Nitrite Urine Negative NEG^Negative    Blood Urine Negative NEG^Negative    Leukocyte Esterase Urine Moderate (A) NEG^Negative    Source Midstream Urine     WBC Urine 25-50 (A) OTO2^O - 2 /HPF    RBC Urine O - 2 OTO2^O - 2 /HPF    Bacteria Urine Moderate (A) NEG^Negative /HPF          ASSESSMENT/PLAN:                                                      ICD-10-CM    1. Dysuria R30.0 UA with Microscopic reflex to Culture   2. Nonspecific finding on examination of urine R82.90 Urine Culture Aerobic Bacterial   3. Acute cystitis without hematuria N30.00 nitroFURantoin, macrocrystal-monohydrate, (MACROBID) 100 MG capsule       Patient Instructions   I will place the patient on another 10 day course of Macrodantin 100 mg twice daily.  His kidney function has been a little bit borderline on his last test but prior to that had been fine.  He will finish his 10 day course of Macrodantin and if he is completely asymptomatic he will follow-up in January for his already scheduled physical.  If he has any persistent symptoms after finishing the 10 day course of antibiotics she will return sooner shortly after [place].      Dickson Reich MD  [facility]

## 2017-12-13 NOTE — PATIENT INSTRUCTIONS
I will place the patient on another 10 day course of Macrodantin 100 mg twice daily.  His kidney function has been a little bit borderline on his last test but prior to that had been fine.  He will finish his 10 day course of Macrodantin and if he is completely asymptomatic he will follow-up in January for his already scheduled physical.  If he has any persistent symptoms after finishing the 10 day course of antibiotics she will return sooner shortly after [place].

## 2017-12-20 NOTE — PROGRESS NOTES
Infusion Nursing Note:  Roc Parkinson presents today for labs, poss aranesp.    Patient seen by provider today: Yes: Dr. Villaseñor   present during visit today: Not Applicable.    Note: N/A.    Intravenous Access:  Lab draw site right hand, Needle type bf, Gauge 23.  Labs drawn without difficulty.    Treatment Conditions:  Lab Results   Component Value Date    HGB 8.0 12/20/2017     Lab Results   Component Value Date    WBC 3.4 12/20/2017      Lab Results   Component Value Date    ANEU 3.1 12/20/2017     Lab Results   Component Value Date     12/20/2017      Results reviewed, labs MET treatment parameters, ok to proceed with treatment.        Post Infusion Assessment:  Patient tolerated injection without incident.  Site patent and intact, free from redness, edema or discomfort.    Discharge Plan:   Copy of AVS reviewed with patient and/or family.  Patient will return as [facility] for next appointment.  Patient discharged in stable condition accompanied by: self.  Departure Mode: Ambulatory.    Alfa Morgan RN

## 2017-12-20 NOTE — Clinical Note
"    12/20/2017         RE: Roc Parkinson  [address]   [facility] [id number]        Dear Colleague,    Thank you for referring your patient, Roc Parkinson, to the [facility]. Please see a copy of my visit note below.    Oncology Rooming Note    December 20, 2017 11:20 AM   Roc Parkinson is a 89 year old male who presents for:    Chief Complaint   Patient presents with     Oncology Clinic Visit     MDS (myelodysplastic syndrome) (H)     Initial Vitals: BP (P) 120/60  Pulse (P) 80  Temp (P) 98  F (36.7  C) (Tympanic)  Resp (P) 16  Wt (P) 92.4 kg (203 lb 9.6 oz)  BMI (P) 29.21 kg/m2 Estimated body mass index is 29.21 kg/(m^2) (pended) as calculated from the following:    Height as of 12/1/17: 1.778 m (5' 10\").    Weight as of this encounter: (P) 92.4 kg (203 lb 9.6 oz). Body surface area is 2.14 meters squared (pended).  Data Unavailable Comment: Data Unavailable   No LMP for male patient.  Allergies reviewed: Yes  Medications reviewed: Yes    Medications: Medication refills not needed today.  Pharmacy name entered into [facility]:    [facility] [id number] [facility] [address] AT [facility] & [address] [place] [address] PHARMACY [place], [place] - [address]  [facility] [place], [place] - [address]    Clinical concerns: no    5 minutes for nursing intake (face to face time)       Winsome Diaz MA                [facility]  HEMATOLOGY ONCOLOGY     DIAGNOSIS:    1- Kappa light chain IgM multiple myeloma in a 89-year-old patient.  Bone marrow biopsy on 11/17/2016 showed hypercellular bone marrow with 65% cellularity of light chain restricted plasma cells.  FISH or G-banding could not be performed due to insufficient sample.   There is a background of myelodysplastic syndrome.  The patient was initially seen in the hospital on 11/17/2016 for macrocytic anemia and pancytopenia and transfusion requirement and a " bone marrow biopsy was performed.   2- History of prostate cancer s/p EBRT s/p brachytherapy in 2003 (recent PSA 0.10), superficial bladder cancer ,no recurrences since 1986     TREATMENT: 12/15/16 velcade/dex. 4/12/17 added cyclophosphamide 300 mg weekly.6/28/17 we discontinued velcade due to concern for neuropathy and continued with weekly cyclophosphamide and dexamethasone.      SUBJECTIVE:  The patient was seen as a followup today. ***    REVIEW OF SYSTEMS:  A complete review of systems was performed and found to be negative other than pertinent positives mentioned in history of present illness.     Past medical, social histories reviewed.    Meds- Reviewed.     PHYSICAL EXAMINATION:   VITAL SIGNS:/60  Pulse 80  Temp 98  F (36.7  C) (Tympanic)  Resp 16  Wt 92.4 kg (203 lb 9.6 oz)  BMI 29.21 kg/m2  CONSTITUTIONAL: Appears tired.   HEENT: Pupils are equal. Oropharynx is clear.   NECK: No cervical or supraclavicular lymphadenopathy.   RESPIRATORY: Clear bilaterally.   CARD/VASC: S1, S2, regular.   GI: Soft, nontender, nondistended, no hepatosplenomegaly.   MUSKULOSKELETAL: Warm, well perfused.   NEUROLOGIC: Alert, awake.   INTEGUMENT: No rash.   LYMPHATICS: Trace edema.   PSYCH: Mood and affect was normal.     LABORATORY DATA AND IMAGING REVIEWED DURING THIS VISIT:  Recent Labs   Lab Test  11/29/17   1130  11/08/17   1118   NA  136  138   POTASSIUM  4.6  4.8   CHLORIDE  106  108   CO2  21  21   ANIONGAP  9  9   BUN  32*  26   CR  1.19  0.96   GLC  236*  94   MICHELLE  9.5  9.4     Recent Labs   Lab Test  11/29/17   1130  11/08/17   1118  10/18/17   1050   WBC  5.5  6.1  3.9*   HGB  8.4*  10.8*  9.8*   PLT  143*  142*  142*   MCV  110*  109*  111*   NEUTROPHIL  90.5  90.0  89.6     Recent Labs   Lab Test  11/29/17   1130  11/08/17   1118  10/18/17   1050   11/17/16   0938   BILITOTAL  0.5  0.5  0.5   < >   --    ALKPHOS  87  72  61   < >   --    ALT  14  17  17   < >   --    AST  15  13  17   < >   --     ALBUMIN  2.8*  3.1*  3.1*   < >   --    LDH   --    --    --    --   110    < > = values in this interval not displayed.     Results for JACOB MEDELLIN (MRN [id number]) as of 12/20/2017 11:24   Ref. Range 9/27/2017 09:55 10/18/2017 12:30   Albumin Fraction Latest Ref Range: 3.7 - 5.1 g/dL 3.6 (L) 3.8   Alpha 1 Fraction Latest Ref Range: 0.2 - 0.4 g/dL 0.3 0.3   Alpha 2 Fraction Latest Ref Range: 0.5 - 0.9 g/dL 0.7 0.7   Beta Fraction Latest Ref Range: 0.6 - 1.0 g/dL 0.7 0.7   ELP Interpretation: Unknown Monoclonal protei... Monoclonal protei...   Gamma Fraction Latest Ref Range: 0.7 - 1.6 g/dL 1.6 1.6   IGA Latest Ref Range: 70 - 380 mg/dL 20 (L) 26 (L)   IGG Latest Ref Range: 695 - 1620 mg/dL 323 (L) 311 (L)   IGM Latest Ref Range: 60 - 265 mg/dL 2230 (H) 2200 (H)   Immunofixation ELP Unknown (Note) (Note)   Kappa Free Lt Chain Latest Ref Range: 0.33 - 1.94 mg/dL 55.00 (H) 55.25 (H)   Kappa Lambda Ratio Latest Ref Range: 0.26 - 1.65  54.46 (H) 63.51 (H)   Lambda Free Lt Chain Latest Ref Range: 0.57 - 2.63 mg/dL 1.01 0.87   Monoclonal Peak Latest Ref Range: 0.0 g/dL 1.3 (H) 1.2 (H)   ECOG PS: 1    ASSESSMENT:  This is a 89-year-old gentleman who has kappa light chain multiple myeloma with hypercellular marrow with 65% of cells are light chain restricted plasma cells.  He had severe pancytopenia and has needed a transfusion in the hospital.  He did not have hypercalcemia and his renal function was normal. He has a background of myelodysplastic syndrome on his bone marrow biopsy; however, majority of the cell population was monoclonal plasma cell population.   He was started on treatment due to cytopenia.   In 4/2017 his light chain levels were getting worse. M spike was stable. We added cyclophosphamide to the regimen. In 6/2017 discontinued velcade for concern of development of neuropathy, in hindsight the pain appeared to be related to arthritis.     - Labs were reviewed with the patient, normocytic anemia  related to disease. Thrombocytopenia at 142K. Mild leukopenia.   M spike elevated at 1.3, better, stable light chains.   - He is on Aranesp 300 mcg SQ every three weeks.    PLAN:   1.  Continue cyclophosphamide, dexamethasone   2.  Zometa every 12 weeks  3. Continue Aranesp 300 mcg SQ every three weeks  4- RTC MD 6 weeks  5- Labs today SPEP, IFXN, light chains  ***  GRETEL QUINN MD    12/20/2017        Again, thank you for allowing me to participate in the care of your patient.        Sincerely,        Gretel Quinn MD

## 2017-12-20 NOTE — PROGRESS NOTES
[facility]  HEMATOLOGY ONCOLOGY     DIAGNOSIS:    1- Kappa light chain IgM multiple myeloma in a 89-year-old patient.  Bone marrow biopsy on 11/17/2016 showed hypercellular bone marrow with 65% cellularity of light chain restricted plasma cells.  FISH or G-banding could not be performed due to insufficient sample.   There is a background of myelodysplastic syndrome.  The patient was initially seen in the hospital on 11/17/2016 for macrocytic anemia and pancytopenia and transfusion requirement and a bone marrow biopsy was performed.   2- History of prostate cancer s/p EBRT s/p brachytherapy in 2003 (recent PSA 0.10), superficial bladder cancer ,no recurrences since 1986     TREATMENT: 12/15/16 velcade/dex. 4/12/17 added cyclophosphamide 300 mg weekly.6/28/17 we discontinued velcade due to concern for neuropathy and continued with weekly cyclophosphamide and dexamethasone.      SUBJECTIVE:  The patient was seen as a followup today. He has been fatigued. Continue sto tolerate treatment well. His appetite is good.     REVIEW OF SYSTEMS:  A complete review of systems was performed and found to be negative other than pertinent positives mentioned in history of present illness.     Past medical, social histories reviewed.    Meds- Reviewed.     PHYSICAL EXAMINATION:   VITAL SIGNS:/60  Pulse 80  Temp 98  F (36.7  C) (Tympanic)  Resp 16  Wt 92.4 kg (203 lb 9.6 oz)  BMI 29.21 kg/m2  CONSTITUTIONAL: Appears tired.   HEENT: Pupils are equal. Oropharynx is clear.   NECK: No cervical or supraclavicular lymphadenopathy.   RESPIRATORY: Clear bilaterally.   CARD/VASC: S1, S2, regular.   GI: Soft, nontender, nondistended, no hepatosplenomegaly.   MUSKULOSKELETAL: Warm, well perfused.   NEUROLOGIC: Alert, awake.   INTEGUMENT: No rash.   LYMPHATICS: Trace edema.   PSYCH: Mood and affect was normal.     LABORATORY DATA AND IMAGING REVIEWED DURING THIS VISIT:  Recent Labs   Lab Test  11/29/17   1130   11/08/17   1118   NA  136  138   POTASSIUM  4.6  4.8   CHLORIDE  106  108   CO2  21  21   ANIONGAP  9  9   BUN  32*  26   CR  1.19  0.96   GLC  236*  94   MICHELLE  9.5  9.4     Recent Labs   Lab Test  11/29/17   1130  11/08/17   1118  10/18/17   1050   WBC  5.5  6.1  3.9*   HGB  8.4*  10.8*  9.8*   PLT  143*  142*  142*   MCV  110*  109*  111*   NEUTROPHIL  90.5  90.0  89.6     Recent Labs   Lab Test  11/29/17   1130  11/08/17   1118  10/18/17   1050   11/17/16   0938   BILITOTAL  0.5  0.5  0.5   < >   --    ALKPHOS  87  72  61   < >   --    ALT  14  17  17   < >   --    AST  15  13  17   < >   --    ALBUMIN  2.8*  3.1*  3.1*   < >   --    LDH   --    --    --    --   110    < > = values in this interval not displayed.     Results for LACIE JACOB G (MRN [id number]) as of 12/20/2017 11:24   Ref. Range 9/27/2017 09:55 10/18/2017 12:30   Albumin Fraction Latest Ref Range: 3.7 - 5.1 g/dL 3.6 (L) 3.8   Alpha 1 Fraction Latest Ref Range: 0.2 - 0.4 g/dL 0.3 0.3   Alpha 2 Fraction Latest Ref Range: 0.5 - 0.9 g/dL 0.7 0.7   Beta Fraction Latest Ref Range: 0.6 - 1.0 g/dL 0.7 0.7   ELP Interpretation: Unknown Monoclonal protei... Monoclonal protei...   Gamma Fraction Latest Ref Range: 0.7 - 1.6 g/dL 1.6 1.6   IGA Latest Ref Range: 70 - 380 mg/dL 20 (L) 26 (L)   IGG Latest Ref Range: 695 - 1620 mg/dL 323 (L) 311 (L)   IGM Latest Ref Range: 60 - 265 mg/dL 2230 (H) 2200 (H)   Immunofixation ELP Unknown (Note) (Note)   Kappa Free Lt Chain Latest Ref Range: 0.33 - 1.94 mg/dL 55.00 (H) 55.25 (H)   Kappa Lambda Ratio Latest Ref Range: 0.26 - 1.65  54.46 (H) 63.51 (H)   Lambda Free Lt Chain Latest Ref Range: 0.57 - 2.63 mg/dL 1.01 0.87   Monoclonal Peak Latest Ref Range: 0.0 g/dL 1.3 (H) 1.2 (H)   ECOG PS: 1    ASSESSMENT:  This is a 89-year-old gentleman who has kappa light chain multiple myeloma with hypercellular marrow with 65% of cells are light chain restricted plasma cells.  He had severe pancytopenia and has needed a  transfusion in the hospital.  He did not have hypercalcemia and his renal function was normal. He has a background of myelodysplastic syndrome on his bone marrow biopsy; however, majority of the cell population was monoclonal plasma cell population.   He was started on treatment due to cytopenia.   In 4/2017 his light chain levels were getting worse. M spike was stable. We added cyclophosphamide to the regimen. In 6/2017 discontinued velcade for concern of development of neuropathy, in hindsight the pain appeared to be related to arthritis.     - Labs were reviewed with the patient, his hemoglobin has declined. Thrombocytopenia. Anemia explains his fatigue. I will have him get myeloma labs today.   - He is on Aranesp 300 mcg SQ every three weeks.    PLAN:   1.  Continue cyclophosphamide, dexamethasone   2.  Zometa every 12 weeks  3. Continue Aranesp 300 mcg SQ every three weeks  4- RTC MD 6 weeks  5- Labs today SPEP, IFXN, light chains    ALISON JON MD    12/20/2017

## 2017-12-20 NOTE — MR AVS SNAPSHOT
After Visit Summary   12/20/2017    Roc Parkinson    MRN: [id number]           Patient Information     Date Of Birth          7/7/1926        Visit Information        Provider Department      12/20/2017 11:00 AM  INFUSION CHAIR 5 [facility]        Today's Diagnoses     Multiple myeloma not having achieved remission (H)    -  1    Macrocytic anemia        MDS (myelodysplastic syndrome) (H)           Follow-ups after your visit        Your next 10 appointments already scheduled     Dec 26, 2017 10:30 AM CST   Diabetic Education with  DIABETIC ED RESOURCE   [facility]    [address] [address]   [phone]            Jan 08, 2018  8:30 AM CST   PHYSICAL with Dickson Reich MD   [facility] ([facility])    [address] [place] [address]   [phone]            Ye 10, 2018 11:00 AM CST   Return Visit with  Oncology Nurse   [facility] ([facility]    [facility] [place] [id number]  [place] [address]   [phone]            Ye 10, 2018 11:30 AM CST   Level 1 with  INFUSION CHAIR 16   [facility] ([facility])    [facility]  [address]  [place] [address]   [phone]            Jan 31, 2018 11:00 AM CST   Return Visit with  Oncology Nurse   [facility] ([facility])    [facility]  [address]  [place] [address]   [phone]            Jan 31, 2018 11:30 AM CST   Level 1 with  INFUSION CHAIR 16   [facility] ([facility])    [facility]  [address]  [place] [address]   [phone]  "           2018 11:45 AM CST   Return Visit with Gretel Quinn MD   [facility] ([facility])    Count includes the [facility] [address]  [place] [id number] [phone]            Mar 14, 2018 11:00 AM CDT   Level 1 with  INFUSION CHAIR 17   [facility] ([facility])    Count includes the [facility] [place]  [address]  [place] [id number] [phone]              Who to contact     If you have questions or need follow up information about today's clinic visit or your schedule please contact [facility] directly at [phone].  Normal or non-critical lab and imaging results will be communicated to you by [employer], letter or phone within 4 business days after the clinic has received the results. If you do not hear from us within 7 days, please contact the clinic through [employer] or phone. If you have a critical or abnormal lab result, we will notify you by phone as soon as possible.  Submit refill requests through [employer] or call your pharmacy and they will forward the refill request to us. Please allow 3 business days for your refill to be completed.          Additional Information About Your Visit        [employer] Information     [employer] lets you send messages to your doctor, view your test results, renew your prescriptions, schedule appointments and more. To sign up, go to [email or web address] . Click on \"Log in\" on the left side of the screen, which will take you to the Welcome page. Then click on \"Sign up Now\" on the right side of the page.     You will be asked to enter the access code listed below, as well as some personal information. Please follow the directions to create your username and password.     Your access code is: [email or web address]  Expires: 2017  9:12 AM     Your access code will  in 90 days. If you need help or a new code, please call your " [facility] or [phone].        Care EveryWhere ID     This is your Care EveryWhere ID. This could be used by other organizations to access your [facility] medical records  [id number]        Your Vitals Were     Pulse Temperature Respirations             81 98.3  F (36.8  C) (Oral) 16          Blood Pressure from Last 3 Encounters:   12/20/17 121/58   12/20/17 121/58   12/13/17 120/60    Weight from Last 3 Encounters:   12/20/17 92.4 kg (203 lb 9.6 oz)   12/13/17 90.7 kg (200 lb)   12/04/17 89.4 kg (197 lb)              We Performed the Following     CBC with platelets differential     Comprehensive metabolic panel     Kappa and lambda light chain     Protein electrophoresis     Protein Immunofixation Serum          Today's Medication Changes          These changes are accurate as of: 12/20/17  2:13 PM.  If you have any questions, ask your nurse or doctor.               These medicines have changed or have updated prescriptions.        Dose/Directions    * cyclophosphamide 50 MG capsule CHEMO   Commonly known as:  CYTOXAN   This may have changed:  Another medication with the same name was added. Make sure you understand how and when to take each.   Used for:  Multiple myeloma not having achieved remission (H)   Changed by:  Gretel Quinn MD        Dose:  300 mg   Take 6 capsules (300 mg) by mouth once a week   Quantity:  24 capsule   Refills:  0       * cyclophosphamide 50 MG capsule CHEMO   Commonly known as:  CYTOXAN   This may have changed:  Another medication with the same name was added. Make sure you understand how and when to take each.   Used for:  Multiple myeloma not having achieved remission (H)   Changed by:  Celia Damico RPH        Dose:  300 mg   Take 6 capsules (300 mg) by mouth once a week   Quantity:  24 capsule   Refills:  0       * cyclophosphamide 50 MG capsule CHEMO   Commonly known as:  CYTOXAN   This may have changed:  Another medication with the same name was added. Make sure you  understand how and when to take each.   Used for:  Multiple myeloma not having achieved remission (H)        Dose:  300 mg   Take 6 capsules (300 mg) by mouth once a week   Quantity:  24 capsule   Refills:  0       * cyclophosphamide 50 MG capsule CHEMO   Commonly known as:  CYTOXAN   This may have changed:  Another medication with the same name was added. Make sure you understand how and when to take each.   Used for:  Multiple myeloma not having achieved remission (H)   Changed by:  Gretel Quinn MD        Dose:  300 mg   Take 6 capsules (300 mg) by mouth once a week   Quantity:  24 capsule   Refills:  0       * cyclophosphamide 50 MG capsule CHEMO   Commonly known as:  CYTOXAN   This may have changed:  You were already taking a medication with the same name, and this prescription was added. Make sure you understand how and when to take each.   Used for:  Multiple myeloma not having achieved remission (H)        Dose:  300 mg   Take 6 capsules (300 mg) by mouth once a week   Quantity:  24 capsule   Refills:  0       * dexamethasone 4 MG tablet   Commonly known as:  DECADRON   This may have changed:  Another medication with the same name was added. Make sure you understand how and when to take each.   Used for:  Multiple myeloma not having achieved remission (H)   Changed by:  Gretel Quinn MD        Dose:  40 mg   Take 10 tablets (40 mg) by mouth every 7 days for 4 doses Take daily on Days 1, 8, 15, and 22.   Quantity:  40 tablet   Refills:  0       * dexamethasone 4 MG tablet   Commonly known as:  DECADRON   This may have changed:  You were already taking a medication with the same name, and this prescription was added. Make sure you understand how and when to take each.   Used for:  Multiple myeloma not having achieved remission (H)        Dose:  40 mg   Take 10 tablets (40 mg) by mouth every 7 days for 4 doses Take daily on Days 1, 8, 15, and 22.   Quantity:  40 tablet   Refills:  0       * Notice:  This list has  7 medication(s) that are the same as other medications prescribed for you. Read the directions carefully, and ask your doctor or other care provider to review them with you.         Where to get your medicines      These medications were sent to [facility] [place], [place] - [address]  [address], [facility] [address]     Phone:  [phone]     cyclophosphamide 50 MG capsule CHEMO    dexamethasone 4 MG tablet                Primary Care Provider Office Phone # Fax #    Dickson Reich [phone]       [address] [facility] [address]        Equal Access to Services     [facility] Center[facility] : Hadii aad ku hadasho Soomaali, waaxda luqadaha, qaybta kaalmada adesadieyada, melita shaw . So [facility] [phone].    ATENCIÓN: Si habla español, tiene a nolan disposición servicios gratuitos de asistencia lingüística. [facility] [phone].    We comply with applicable federal civil rights laws and [place] laws. We do not discriminate on the basis of race, color, national origin, age, disability, sex, sexual orientation, or gender identity.            Thank you!     Thank you for choosing [facility] AND [facility]  for your care. Our goal is always to provide you with excellent care. Hearing back from our patients is one way we can continue to improve our services. Please take a few minutes to complete the written survey that you may receive in the mail after your visit with us. Thank you!             Your Updated Medication List - Protect others around you: Learn how to safely use, store and throw away your medicines at www.disposemymeds.org.          This list is accurate as of: 12/20/17  2:13 PM.  Always use your most recent med list.                   Brand Name Dispense Instructions for use Diagnosis    acyclovir 400 MG tablet    ZOVIRAX    60 tablet    Take 1 tablet (400 mg) by mouth 2 times daily Viral Prophylaxis.     Multiple myeloma not having achieved remission (H)       amLODIPine 5 MG tablet    NORVASC    30 tablet    TAKE 1 TABLET BY MOUTH DAILY    Essential hypertension, benign       * B-D U/F 31G X 8 MM   Generic drug:  insulin pen needle           * B-D U/F 31G X 8 MM   Generic drug:  insulin pen needle     500 each    USE 5 PEN NEEDLES PER DAY OR AS DIRECTED    Type 2 diabetes mellitus with stage 3 chronic kidney disease, with long-term current use of insulin (H)       * cyclophosphamide 50 MG capsule CHEMO    CYTOXAN    24 capsule    Take 6 capsules (300 mg) by mouth once a week    Multiple myeloma not having achieved remission (H)       * cyclophosphamide 50 MG capsule CHEMO    CYTOXAN    24 capsule    Take 6 capsules (300 mg) by mouth once a week    Multiple myeloma not having achieved remission (H)       * cyclophosphamide 50 MG capsule CHEMO    CYTOXAN    24 capsule    Take 6 capsules (300 mg) by mouth once a week    Multiple myeloma not having achieved remission (H)       * cyclophosphamide 50 MG capsule CHEMO    CYTOXAN    24 capsule    Take 6 capsules (300 mg) by mouth once a week    Multiple myeloma not having achieved remission (H)       * cyclophosphamide 50 MG capsule CHEMO    CYTOXAN    24 capsule    Take 6 capsules (300 mg) by mouth once a week    Multiple myeloma not having achieved remission (H)       * dexamethasone 4 MG tablet    DECADRON    40 tablet    Take 10 tablets (40 mg) by mouth every 7 days for 4 doses Take daily on Days 1, 8, 15, and 22.    Multiple myeloma not having achieved remission (H)       * dexamethasone 4 MG tablet    DECADRON    40 tablet    Take 10 tablets (40 mg) by mouth every 7 days for 4 doses Take daily on Days 1, 8, 15, and 22.    Multiple myeloma not having achieved remission (H)       ferrous sulfate 325 (65 FE) MG tablet    IRON     Take 325 mg by mouth daily (with breakfast)        FREESTYLE LITE test strip   Generic drug:  blood glucose monitoring     200 strip    USE 1  STRIP TO TEST TWICE DAILY.    Uncontrolled type 1 diabetes mellitus with stage 3 chronic kidney disease (H)       furosemide 20 MG tablet    LASIX    30 tablet    Take 1 tablet (20 mg) by mouth daily    Multiple myeloma not having achieved remission (H)       gemfibrozil 600 MG tablet    LOPID    180 tablet    TAKE 1 TABLET BY MOUTH TWICE DAILY    Hyperlipidemia       glipiZIDE 10 MG tablet    GLUCOTROL    180 tablet    TAKE 1 TABLET BY MOUTH TWICE DAILY BEFORE A MEAL    Type 2 diabetes mellitus with stage 3 chronic kidney disease, with long-term current use of insulin (H)       * insulin aspart 100 UNIT/ML injection    NovoLOG PEN    12 mL    Inject 11 units before breakfast, 10 units before lunch, 9 units before dinner, and 5 units at bedtime. Before meal correction: 140-175 add 1 unit  176-210 add 2 units  211-245 add 3 units  246-280 add 4 units  281-315 add 5 units 316-350 add 6 units  351-385 add 7 units 386-420 add 8 units  Over 420 add 9 units    Uncontrolled type 2 diabetes mellitus with hyperglycemia, unspecified long term insulin use status (H)       * NovoLOG FLEXPEN 100 UNIT/ML injection   Generic drug:  insulin aspart     15 mL    ADMINISTER 1 TO 5 UNITS UNDER THE SKIN FOUR TIMES DAILY WITH MEALS AND NIGHTLY    Uncontrolled type 2 diabetes mellitus with hyperglycemia, unspecified long term insulin use status (H)       insulin detemir 100 UNIT/ML injection    LEVEMIR FLEXPEN/FLEXTOUCH    15 mL    Inject 14 Units Subcutaneous every morning (before breakfast) On Wednesday and Thursday morning, inject 16 units.    Type 2 diabetes mellitus with diabetic chronic kidney disease, unspecified CKD stage, unspecified long term insulin use status (H)       levothyroxine 25 MCG tablet    SYNTHROID/LEVOTHROID    90 tablet    TAKE 1 TABLET BY MOUTH EVERY DAY    Acquired hypothyroidism       * lisinopril 30 MG tablet    PRINIVIL,ZESTRIL    90 tablet    TAKE 1 TABLET BY MOUTH EVERY DAY    Essential hypertension, benign        * lisinopril 30 MG tablet    PRINIVIL,ZESTRIL    90 tablet    Take 0.5 tablets (15 mg) by mouth daily    Essential hypertension, benign       metoprolol 25 MG tablet    LOPRESSOR    180 tablet    TAKE 1 TABLET BY MOUTH TWICE DAILY    Essential hypertension       nitroFURantoin (macrocrystal-monohydrate) 100 MG capsule    MACROBID    20 capsule    Take 1 capsule (100 mg) by mouth 2 times daily for 10 days    Acute cystitis without hematuria       omeprazole 20 MG CR capsule    priLOSEC    90 capsule    TAKE 1 CAPSULE BY MOUTH EVERY DAY    Congenital hiatus hernia       ondansetron 8 MG tablet    ZOFRAN    10 tablet    Take 1 tablet (8 mg) by mouth every 8 hours as needed (Nausea/Vomiting)    Multiple myeloma not having achieved remission (H)       polyethylene glycol powder    MIRALAX/GLYCOLAX          prochlorperazine 5 MG tablet    COMPAZINE    30 tablet    Take 1 tablet (5 mg) by mouth every 6 hours as needed (Nausea/Vomiting)    Multiple myeloma not having achieved remission (H)       Selenium 200 MCG Tabs      Take 100 mcg by mouth daily. Pt takes one half tab of the 200 mcg daily        tamsulosin 0.4 MG capsule    FLOMAX    90 capsule    TAKE 1 CAPSULE BY MOUTH DAILY    Malignant neoplasm of prostate (H)       VITAMIN C PO      Take 1,000 mg by mouth daily        VITAMIN D3 PO      Take 1,000 Units by mouth daily        * Notice:  This list has 13 medication(s) that are the same as other medications prescribed for you. Read the directions carefully, and ask your doctor or other care provider to review them with you.

## 2017-12-20 NOTE — MR AVS SNAPSHOT
After Visit Summary   12/20/2017    Roc Parkinson    MRN: [id number]           Patient Information     Date Of Birth          7/7/1926        Visit Information        Provider Department      12/20/2017 11:30 AM Gretel Quinn MD [facility]        Today's Diagnoses     Multiple myeloma not having achieved remission (H)    -  1      Care Instructions    1.  Continue cyclophosphamide, dexamethasone   2.  Zometa every 12 weeks  3. Continue Aranesp 300 mcg SQ every three weeks  4- RTC MD 6 weeks  5- Labs today SPEP, IFXN, light chains          Follow-ups after your visit        Your next 10 appointments already scheduled     Dec 26, 2017 10:30 AM CST   Diabetic Education with  DIABETIC ED RESOURCE   [facility]    [address] [id number] [phone]            Jan 08, 2018  8:30 AM CST   PHYSICAL with Dickson Reich MD   [facility]    [address] [place] [address]   [phone]            Ye 10, 2018 11:00 AM CST   Level 1 with SH INFUSION CHAIR 6   [facility] ([facility])    [facility] [place] [id number]  [place] [address]   [phone]            Jan 31, 2018 11:00 AM CST   Level 1 with SH INFUSION CHAIR 9   [facility] ([facility])    [facility]  [address]  [place] [address]   [phone]            Jan 31, 2018 11:30 AM CST   Return Visit with Gretel Quinn MD   [facility] ([facility])    [facility]  [address]  [place] [address]   [phone]            Mar 14, 2018 11:00 AM CDT   Level 1 with SH INFUSION CHAIR 17   [facility] ([facility])    [facility] [place]  [address]  [place] [address] [phone]              Who to contact     If you have questions or need follow up information about today's clinic visit or your schedule please contact [facility] directly at [phone].  Normal or non-critical lab and imaging results will be communicated to you by MyChart, letter or phone within 4 business days after the clinic has received the results. If you do not hear from us within 7 days, please contact the clinic through MyChart or phone. If you have a critical or abnormal lab result, we will notify you by phone as soon as possible.  Submit refill requests through [email or web address] or call your pharmacy and they will forward the refill request to us. Please allow 3 business days for your refill to be completed.          Additional Information About Your Visit        [employer] Information     [email or web address] lets you send messages to your doctor, view your test results, renew your prescriptions, schedule appointments and more. To sign up, go to [email or web address] . Click on \"Log in\" on the left side of the screen, which will take you to the Welcome page. Then click on \"Sign up Now\" on the right side of the page.     You will be asked to enter the access code listed below, as well as some personal information. Please follow the directions to create your username and password.     Your access code is: [email or web address]  Expires: 2017  9:12 AM     Your access code will  in 90 days. If you need help or a new code, please call your [facility] clinic or [phone].        Care EveryWhere ID     This is your Care EveryWhere ID. This could be used by other organizations to access your [facility] medical records  [id number]        Your Vitals Were     Pulse Temperature Respirations BMI (Body Mass Index)          81 98  F (36.7  C) (Oral) 16 29.21 kg/m2         Blood Pressure from Last 3 " Encounters:   12/20/17 121/58   12/20/17 121/58   12/13/17 120/60    Weight from Last 3 Encounters:   12/20/17 92.4 kg (203 lb 9.6 oz)   12/13/17 90.7 kg (200 lb)   12/04/17 89.4 kg (197 lb)              Today, you had the following     No orders found for display         Today's Medication Changes          These changes are accurate as of: 12/20/17 12:21 PM.  If you have any questions, ask your nurse or doctor.               These medicines have changed or have updated prescriptions.        Dose/Directions    * cyclophosphamide 50 MG capsule CHEMO   Commonly known as:  CYTOXAN   This may have changed:  Another medication with the same name was added. Make sure you understand how and when to take each.   Used for:  Multiple myeloma not having achieved remission (H)   Changed by:  Gretel Quinn MD        Dose:  300 mg   Take 6 capsules (300 mg) by mouth once a week   Quantity:  24 capsule   Refills:  0       * cyclophosphamide 50 MG capsule CHEMO   Commonly known as:  CYTOXAN   This may have changed:  Another medication with the same name was added. Make sure you understand how and when to take each.   Used for:  Multiple myeloma not having achieved remission (H)   Changed by:  Celia Damico RPH        Dose:  300 mg   Take 6 capsules (300 mg) by mouth once a week   Quantity:  24 capsule   Refills:  0       * cyclophosphamide 50 MG capsule CHEMO   Commonly known as:  CYTOXAN   This may have changed:  Another medication with the same name was added. Make sure you understand how and when to take each.   Used for:  Multiple myeloma not having achieved remission (H)        Dose:  300 mg   Take 6 capsules (300 mg) by mouth once a week   Quantity:  24 capsule   Refills:  0       * cyclophosphamide 50 MG capsule CHEMO   Commonly known as:  CYTOXAN   This may have changed:  Another medication with the same name was added. Make sure you understand how and when to take each.   Used for:  Multiple myeloma not having achieved  remission (H)   Changed by:  Gretel Quinn MD        Dose:  300 mg   Take 6 capsules (300 mg) by mouth once a week   Quantity:  24 capsule   Refills:  0       * cyclophosphamide 50 MG capsule CHEMO   Commonly known as:  CYTOXAN   This may have changed:  You were already taking a medication with the same name, and this prescription was added. Make sure you understand how and when to take each.   Used for:  Multiple myeloma not having achieved remission (H)        Dose:  300 mg   Take 6 capsules (300 mg) by mouth once a week   Quantity:  24 capsule   Refills:  0       * dexamethasone 4 MG tablet   Commonly known as:  DECADRON   This may have changed:  Another medication with the same name was added. Make sure you understand how and when to take each.   Used for:  Multiple myeloma not having achieved remission (H)   Changed by:  Gretel Quinn MD        Dose:  40 mg   Take 10 tablets (40 mg) by mouth every 7 days for 4 doses Take daily on Days 1, 8, 15, and 22.   Quantity:  40 tablet   Refills:  0       * dexamethasone 4 MG tablet   Commonly known as:  DECADRON   This may have changed:  You were already taking a medication with the same name, and this prescription was added. Make sure you understand how and when to take each.   Used for:  Multiple myeloma not having achieved remission (H)        Dose:  40 mg   Take 10 tablets (40 mg) by mouth every 7 days for 4 doses Take daily on Days 1, 8, 15, and 22.   Quantity:  40 tablet   Refills:  0       * Notice:  This list has 7 medication(s) that are the same as other medications prescribed for you. Read the directions carefully, and ask your doctor or other care provider to review them with you.         Where to get your medicines      These medications were sent to [address] [address]     Phone:  [phone]     cyclophosphamide 50 MG capsule CHEMO    dexamethasone 4 MG tablet                 Primary Care Provider Office Phone # Fax #    Dickson Reich [phone] [address]  [facility] [address]        Equal Access to Services     ELIE GAGNON : Jeanne virginia wilson gabyo Somaeali, waaxda luqadaha, qaybta kaalmada ademalinda, melita jacobson laLynnmarge rodriguez. So [facility] [phone].    ATENCIÓN: Si habla español, tiene a nolan disposición servicios gratuitos de asistencia lingüística. Llame al [phone].    We comply with applicable federal civil rights laws and [place] laws. We do not discriminate on the basis of race, color, national origin, age, disability, sex, sexual orientation, or gender identity.            Thank you!     Thank you for choosing [facility]  for your care. Our goal is always to provide you with excellent care. Hearing back from our patients is one way we can continue to improve our services. Please take a few minutes to complete the written survey that you may receive in the mail after your visit with us. Thank you!             Your Updated Medication List - Protect others around you: Learn how to safely use, store and throw away your medicines at www.disposemymeds.org.          This list is accurate as of: 12/20/17 12:21 PM.  Always use your most recent med list.                   Brand Name Dispense Instructions for use Diagnosis    acyclovir 400 MG tablet    ZOVIRAX    60 tablet    Take 1 tablet (400 mg) by mouth 2 times daily Viral Prophylaxis.    Multiple myeloma not having achieved remission (H)       amLODIPine 5 MG tablet    NORVASC    30 tablet    TAKE 1 TABLET BY MOUTH DAILY    Essential hypertension, benign       * B-D U/F 31G X 8 MM   Generic drug:  insulin pen needle           * B-D U/F 31G X 8 MM   Generic drug:  insulin pen needle     500 each    USE 5 PEN NEEDLES PER DAY OR AS DIRECTED    Type 2 diabetes mellitus with stage 3 chronic kidney disease, with long-term current use of insulin (H)       *  cyclophosphamide 50 MG capsule CHEMO    CYTOXAN    24 capsule    Take 6 capsules (300 mg) by mouth once a week    Multiple myeloma not having achieved remission (H)       * cyclophosphamide 50 MG capsule CHEMO    CYTOXAN    24 capsule    Take 6 capsules (300 mg) by mouth once a week    Multiple myeloma not having achieved remission (H)       * cyclophosphamide 50 MG capsule CHEMO    CYTOXAN    24 capsule    Take 6 capsules (300 mg) by mouth once a week    Multiple myeloma not having achieved remission (H)       * cyclophosphamide 50 MG capsule CHEMO    CYTOXAN    24 capsule    Take 6 capsules (300 mg) by mouth once a week    Multiple myeloma not having achieved remission (H)       * cyclophosphamide 50 MG capsule CHEMO    CYTOXAN    24 capsule    Take 6 capsules (300 mg) by mouth once a week    Multiple myeloma not having achieved remission (H)       * dexamethasone 4 MG tablet    DECADRON    40 tablet    Take 10 tablets (40 mg) by mouth every 7 days for 4 doses Take daily on Days 1, 8, 15, and 22.    Multiple myeloma not having achieved remission (H)       * dexamethasone 4 MG tablet    DECADRON    40 tablet    Take 10 tablets (40 mg) by mouth every 7 days for 4 doses Take daily on Days 1, 8, 15, and 22.    Multiple myeloma not having achieved remission (H)       ferrous sulfate 325 (65 FE) MG tablet    IRON     Take 325 mg by mouth daily (with breakfast)        FREESTYLE LITE test strip   Generic drug:  blood glucose monitoring     200 strip    USE 1 STRIP TO TEST TWICE DAILY.    Uncontrolled type 1 diabetes mellitus with stage 3 chronic kidney disease (H)       furosemide 20 MG tablet    LASIX    30 tablet    Take 1 tablet (20 mg) by mouth daily    Multiple myeloma not having achieved remission (H)       gemfibrozil 600 MG tablet    LOPID    180 tablet    TAKE 1 TABLET BY MOUTH TWICE DAILY    Hyperlipidemia       glipiZIDE 10 MG tablet    GLUCOTROL    180 tablet    TAKE 1 TABLET BY MOUTH TWICE DAILY BEFORE A MEAL     Type 2 diabetes mellitus with stage 3 chronic kidney disease, with long-term current use of insulin (H)       * insulin aspart 100 UNIT/ML injection    NovoLOG PEN    12 mL    Inject 11 units before breakfast, 10 units before lunch, 9 units before dinner, and 5 units at bedtime. Before meal correction: 140-175 add 1 unit  176-210 add 2 units  211-245 add 3 units  246-280 add 4 units  281-315 add 5 units 316-350 add 6 units  351-385 add 7 units 386-420 add 8 units  Over 420 add 9 units    Uncontrolled type 2 diabetes mellitus with hyperglycemia, unspecified long term insulin use status (H)       * NovoLOG FLEXPEN 100 UNIT/ML injection   Generic drug:  insulin aspart     15 mL    ADMINISTER 1 TO 5 UNITS UNDER THE SKIN FOUR TIMES DAILY WITH MEALS AND NIGHTLY    Uncontrolled type 2 diabetes mellitus with hyperglycemia, unspecified long term insulin use status (H)       insulin detemir 100 UNIT/ML injection    LEVEMIR FLEXPEN/FLEXTOUCH    15 mL    Inject 14 Units Subcutaneous every morning (before breakfast) On Wednesday and Thursday morning, inject 16 units.    Type 2 diabetes mellitus with diabetic chronic kidney disease, unspecified CKD stage, unspecified long term insulin use status (H)       levothyroxine 25 MCG tablet    SYNTHROID/LEVOTHROID    90 tablet    TAKE 1 TABLET BY MOUTH EVERY DAY    Acquired hypothyroidism       * lisinopril 30 MG tablet    PRINIVIL,ZESTRIL    90 tablet    TAKE 1 TABLET BY MOUTH EVERY DAY    Essential hypertension, benign       * lisinopril 30 MG tablet    PRINIVIL,ZESTRIL    90 tablet    Take 0.5 tablets (15 mg) by mouth daily    Essential hypertension, benign       metoprolol 25 MG tablet    LOPRESSOR    180 tablet    TAKE 1 TABLET BY MOUTH TWICE DAILY    Essential hypertension       nitroFURantoin (macrocrystal-monohydrate) 100 MG capsule    MACROBID    20 capsule    Take 1 capsule (100 mg) by mouth 2 times daily for 10 days    Acute cystitis without hematuria       omeprazole 20 MG  CR capsule    priLOSEC    90 capsule    TAKE 1 CAPSULE BY MOUTH EVERY DAY    Congenital hiatus hernia       ondansetron 8 MG tablet    ZOFRAN    10 tablet    Take 1 tablet (8 mg) by mouth every 8 hours as needed (Nausea/Vomiting)    Multiple myeloma not having achieved remission (H)       polyethylene glycol powder    MIRALAX/GLYCOLAX          prochlorperazine 5 MG tablet    COMPAZINE    30 tablet    Take 1 tablet (5 mg) by mouth every 6 hours as needed (Nausea/Vomiting)    Multiple myeloma not having achieved remission (H)       Selenium 200 MCG Tabs      Take 100 mcg by mouth daily. Pt takes one half tab of the 200 mcg daily        tamsulosin 0.4 MG capsule    FLOMAX    90 capsule    TAKE 1 CAPSULE BY MOUTH DAILY    Malignant neoplasm of prostate (H)       VITAMIN C PO      Take 1,000 mg by mouth daily        VITAMIN D3 PO      Take 1,000 Units by mouth daily        * Notice:  This list has 13 medication(s) that are the same as other medications prescribed for you. Read the directions carefully, and ask your doctor or other care provider to review them with you.

## 2017-12-20 NOTE — PROGRESS NOTES
Oral Chemotherapy Monitoring Program.    Patient currently on cytoxan therapy.    Reviewed labs from 12/20/17    No concerning abnormalities.    Questions answered to patient's satisfaction.    Will follow up in 3 weeks with repeat labs.    Maria Eugenia Cortes PharmD  December 20, 2017

## 2017-12-22 NOTE — PROGRESS NOTES
His myeloma labs are getting worse. We will need to change the treatment. Please have him see me next week. Thanks

## 2017-12-26 NOTE — MR AVS SNAPSHOT
After Visit Summary   12/26/2017    Roc Parkinson    MRN: [id number]           Patient Information     Date Of Birth          7/7/1926        Visit Information        Provider Department      12/26/2017 10:30 AM  DIABETIC ED RESOURCE [facility]        Care Instructions    Changes today:      Novolog  Before breakfast take 13 units.  Before lunch take 11 units.  Before dinner take 9 units.  Bedtime take 4 units.      Continue this scale:  If blood sugar is                 Take Novolog  140-175                                1 unit extra   176-210                                2 units extra  211-245                                3 units extra    246-280                                4 units extra   281-315                                5 units extra   316-350                                6 units extra   351-385                                7 units extra    386-420                                8 units extra  Over 420                               9 units extra      Take Levemir 16 units every morning.    Wednesday and Thursday take 18 units instead.             Follow-ups after your visit        Your next 10 appointments already scheduled     Jan 03, 2018 11:00 AM CST   Level 2 with  INFUSION CHAIR 16   [facility] ([facility] [place])    [facility]  [address]  [facility] [address]   [phone]            Jan 04, 2018 11:00 AM CST   Level 2 with  INFUSION CHAIR 4   [facility] ([facility] [place])    [facility]  [address]  [facility] [address]   [phone]            Jan 08, 2018  8:30 AM CST   PHYSICAL with Dickson Reich MD   [facility] ([facility])    [address] [place] [address]   [phone]             Ye 10, 2018  9:45 AM CST   Return Visit with  Oncology Nurse   [facility] ([facility])    [facility] [place] [address]  [place] [address]   [phone]            Ye 10, 2018 10:15 AM CST   Return Visit with Gretel Quinn MD   [facility] ([facility])    [facility] [place]  [place] [address]   [phone]            Ye 10, 2018 10:30 AM CST   Level 2 with SH INFUSION CHAIR 2   [facility] ([facility])    [facility] [place] [address]  [place] [address]   [phone]            Jan 11, 2018 10:30 AM CST   Level 2 with SH INFUSION CHAIR 8   [facility] ([facility])    [facility] [address]  [place] [address]   [phone]            Jan 17, 2018 10:30 AM CST   Level 2 with SH INFUSION CHAIR 2   [facility] ([facility])    [facility] [address]  [place] [address]   [phone]            Jan 18, 2018 10:00 AM CST   Level 2 with SH INFUSION CHAIR 18   [facility] ([facility])    [facility] [place] [address]  [place] [address]   [phone]            Jan 23, 2018 10:30 AM CST   Diabetic Education with  DIABETIC ED RESOURCE   [facility] ([facility])    [address] [address] [phone]              Who to contact     If you have questions or need follow up information about today's clinic visit or your schedule please contact [facility] directly at [phone].  Normal or non-critical lab and imaging results will be communicated to  "you by MyChart, letter or phone within 4 business days after the clinic has received the results. If you do not hear from us within 7 days, please contact the clinic through [employer] or phone. If you have a critical or abnormal lab result, we will notify you by phone as soon as possible.  Submit refill requests through [employer] or call your pharmacy and they will forward the refill request to us. Please allow 3 business days for your refill to be completed.          Additional Information About Your Visit        [employer] Information     [employer] lets you send messages to your doctor, view your test results, renew your prescriptions, schedule appointments and more. To sign up, go to [email or web address] . Click on \"Log in\" on the left side of the screen, which will take you to the Welcome page. Then click on \"Sign up Now\" on the right side of the page.     You will be asked to enter the access code listed below, as well as some personal information. Please follow the directions to create your username and password.     Your access code is: [email or web address]  Expires: 3/26/2018 11:10 AM     Your access code will  in 90 days. If you need help or a new code, please call your [facility] clinic or [phone].        Care EveryWhere ID     This is your Care EveryWhere ID. This could be used by other organizations to access your [facility] medical records  [id number]         Blood Pressure from Last 3 Encounters:   17 138/68   17 121/58   17 121/58    Weight from Last 3 Encounters:   17 93.9 kg (207 lb)   17 92.4 kg (203 lb 9.6 oz)   17 90.7 kg (200 lb)              Today, you had the following     No orders found for display       Primary Care Provider Office Phone # Fax #    Dickson Reich [phone] [address]  [facility] [address]        Equal Access to Services     ELIE GAGNON : Hadii virginia Driver, osorioda marilou, qaybmagdaleno bradshaw " melita thomasonsadie arteagaaan ah. [facility] [phone].    ATENCIÓN: Si nilela angelo, tiene a nolan disposición servicios gratuitos de asistencia lingüística. Eric al [phone].    We comply with applicable federal civil rights laws and [place] laws. We do not discriminate on the basis of race, color, national origin, age, disability, sex, sexual orientation, or gender identity.            Thank you!     Thank you for choosing [facility]   [place]  for your care. Our goal is always to provide you with excellent care. Hearing back from our patients is one way we can continue to improve our services. Please take a few minutes to complete the written survey that you may receive in the mail after your visit with us. Thank you!             Your Updated Medication List - Protect others around you: Learn how to safely use, store and throw away your medicines at www.disposemymeds.org.          This list is accurate as of: 12/26/17 11:59 PM.  Always use your most recent med list.                   Brand Name Dispense Instructions for use Diagnosis    acyclovir 400 MG tablet    ZOVIRAX    60 tablet    Take 1 tablet (400 mg) by mouth 2 times daily Viral Prophylaxis.    Multiple myeloma not having achieved remission (H)       amLODIPine 5 MG tablet    NORVASC    30 tablet    TAKE 1 TABLET BY MOUTH DAILY    Essential hypertension, benign       * B-D U/F 31G X 8 MM   Generic drug:  insulin pen needle           * B-D U/F 31G X 8 MM   Generic drug:  insulin pen needle     500 each    USE 5 PEN NEEDLES PER DAY OR AS DIRECTED    Type 2 diabetes mellitus with stage 3 chronic kidney disease, with long-term current use of insulin (H)       * cyclophosphamide 50 MG capsule CHEMO    CYTOXAN    24 capsule    Take 6 capsules (300 mg) by mouth once a week    Multiple myeloma not having achieved remission (H)       * cyclophosphamide 50 MG capsule CHEMO    CYTOXAN    24 capsule    Take 6 capsules  (300 mg) by mouth once a week    Multiple myeloma not having achieved remission (H)       * cyclophosphamide 50 MG capsule CHEMO    CYTOXAN    24 capsule    Take 6 capsules (300 mg) by mouth once a week    Multiple myeloma not having achieved remission (H)       * cyclophosphamide 50 MG capsule CHEMO    CYTOXAN    24 capsule    Take 6 capsules (300 mg) by mouth once a week    Multiple myeloma not having achieved remission (H)       * cyclophosphamide 50 MG capsule CHEMO    CYTOXAN    24 capsule    Take 6 capsules (300 mg) by mouth once a week    Multiple myeloma not having achieved remission (H)       dexamethasone 4 MG tablet    DECADRON    40 tablet    Take 10 tablets (40 mg) by mouth every 7 days for 4 doses Take daily on Days 1, 8, 15, and 22.    Multiple myeloma not having achieved remission (H)       ferrous sulfate 325 (65 FE) MG tablet    IRON     Take 325 mg by mouth daily (with breakfast)        FREESTYLE LITE test strip   Generic drug:  blood glucose monitoring     200 strip    USE 1 STRIP TO TEST TWICE DAILY.    Uncontrolled type 1 diabetes mellitus with stage 3 chronic kidney disease (H)       furosemide 20 MG tablet    LASIX    30 tablet    Take 1 tablet (20 mg) by mouth daily    Multiple myeloma not having achieved remission (H)       gemfibrozil 600 MG tablet    LOPID    180 tablet    TAKE 1 TABLET BY MOUTH TWICE DAILY    Hyperlipidemia       glipiZIDE 10 MG tablet    GLUCOTROL    180 tablet    TAKE 1 TABLET BY MOUTH TWICE DAILY BEFORE A MEAL    Type 2 diabetes mellitus with stage 3 chronic kidney disease, with long-term current use of insulin (H)       * insulin aspart 100 UNIT/ML injection    NovoLOG PEN    12 mL    Inject 11 units before breakfast, 10 units before lunch, 9 units before dinner, and 5 units at bedtime. Before meal correction: 140-175 add 1 unit  176-210 add 2 units  211-245 add 3 units  246-280 add 4 units  281-315 add 5 units 316-350 add 6 units  351-385 add 7 units 386-420 add 8  units  Over 420 add 9 units    Uncontrolled type 2 diabetes mellitus with hyperglycemia, unspecified long term insulin use status (H)       * NovoLOG FLEXPEN 100 UNIT/ML injection   Generic drug:  insulin aspart     15 mL    ADMINISTER 1 TO 5 UNITS UNDER THE SKIN FOUR TIMES DAILY WITH MEALS AND NIGHTLY    Uncontrolled type 2 diabetes mellitus with hyperglycemia, unspecified long term insulin use status (H)       insulin detemir 100 UNIT/ML injection    LEVEMIR FLEXPEN/FLEXTOUCH    15 mL    Inject 14 Units Subcutaneous every morning (before breakfast) On Wednesday and Thursday morning, inject 16 units.    Type 2 diabetes mellitus with diabetic chronic kidney disease, unspecified CKD stage, unspecified long term insulin use status (H)       levothyroxine 25 MCG tablet    SYNTHROID/LEVOTHROID    90 tablet    TAKE 1 TABLET BY MOUTH EVERY DAY    Acquired hypothyroidism       * lisinopril 30 MG tablet    PRINIVIL,ZESTRIL    90 tablet    TAKE 1 TABLET BY MOUTH EVERY DAY    Essential hypertension, benign       * lisinopril 30 MG tablet    PRINIVIL,ZESTRIL    90 tablet    Take 0.5 tablets (15 mg) by mouth daily    Essential hypertension, benign       metoprolol 25 MG tablet    LOPRESSOR    180 tablet    TAKE 1 TABLET BY MOUTH TWICE DAILY    Essential hypertension       omeprazole 20 MG CR capsule    priLOSEC    90 capsule    TAKE 1 CAPSULE BY MOUTH EVERY DAY    Congenital hiatus hernia       ondansetron 8 MG tablet    ZOFRAN    10 tablet    Take 1 tablet (8 mg) by mouth every 8 hours as needed (Nausea/Vomiting)    Multiple myeloma not having achieved remission (H)       polyethylene glycol powder    MIRALAX/GLYCOLAX          prochlorperazine 5 MG tablet    COMPAZINE    30 tablet    Take 1 tablet (5 mg) by mouth every 6 hours as needed (Nausea/Vomiting)    Multiple myeloma not having achieved remission (H)       Selenium 200 MCG Tabs      Take 100 mcg by mouth daily. Pt takes one half tab of the 200 mcg daily         tamsulosin 0.4 MG capsule    FLOMAX    90 capsule    TAKE 1 CAPSULE BY MOUTH DAILY    Malignant neoplasm of prostate (H)       VITAMIN C PO      Take 1,000 mg by mouth daily        VITAMIN D3 PO      Take 1,000 Units by mouth daily        * Notice:  This list has 11 medication(s) that are the same as other medications prescribed for you. Read the directions carefully, and ask your doctor or other care provider to review them with you.

## 2017-12-26 NOTE — TELEPHONE ENCOUNTER
Requested Prescriptions   Pending Prescriptions Disp Refills     gemfibrozil (LOPID) 600 MG tablet [Pharmacy Med Name: GEMFIBROZIL 600MG TABLETS]  Last Written Prescription Date:  6/30/2017  Last Fill Quantity: 180 tablet,  # refills: 1   Last Office Visit with RICARDO, P or [facility] prescribing provider:  12/13/2017   Future Office Visit:    Next 5 appointments (look out 90 days)     Dec 27, 2017  2:00 PM CST   Return Visit with Gretel Quinn MD   [facility] ([facility])    [facility] [place] [id number]  [place] [address]   [phone]            Jan 08, 2018  8:30 AM CST   PHYSICAL with Dickson Reich MD   [facility]    [address] [place] [address]   [phone]            Ye 10, 2018 11:00 AM CST   Return Visit with  Oncology Nurse   [facility] ([facility])    [facility] [place] [id number]  [place] [address]   [phone]            Jan 31, 2018 11:00 AM CST   Return Visit with  Oncology Nurse   [facility] ([facility])    [facility]  [address]  [place] [address]   [phone]            Jan 31, 2018 11:45 AM CST   Return Visit with Gretel Quinn MD   [facility] ([facility])    [facility]  [address]  [place] [address]   [phone]                180 tablet 0     Sig: TAKE 1 TABLET BY MOUTH TWICE DAILY    Fibrates Failed    12/23/2017  3:12 AM       Failed - Lipid panel on file in past 12 months    Recent Labs   Lab Test  05/23/17   1014   10/14/15   0929   CHOL  123   < >  127   TRIG  120   < >  87   HDL  35*   < >  27*   LDL  64   < >  83   VLDL   --    --   17   CHOLHDLRATIO   --    --   4.7    < > = values in this interval not displayed.           Passed - No abnormal creatine kinase in past 12 months    No lab results found.       Passed - Recent or future visit with authorizing provider    Patient had office visit in the last year or has a visit in the next 30 days with authorizing provider.  See chart review.          Passed - Patient is age 18 or older

## 2017-12-26 NOTE — Clinical Note
Roc Mckenzie is due for a new diabetes education referral, could you please enter? If agreeable, please add CGM (continuous glucose monitor) to referral. We continue to fine tune insulin for better control. He is scheduled with you 1/8, with me 1/23. Thank you! Kalyn Almanzar RD, LD, CDE

## 2017-12-26 NOTE — PATIENT INSTRUCTIONS
Changes today:      Novolog  Before breakfast take 13 units.  Before lunch take 11 units.  Before dinner take 9 units.  Bedtime take 4 units.      Continue this scale:  If blood sugar is                 Take Novolog  140-175                                1 unit extra   176-210                                2 units extra  211-245                                3 units extra    246-280                                4 units extra   281-315                                5 units extra   316-350                                6 units extra   351-385                                7 units extra    386-420                                8 units extra  Over 420                               9 units extra      Take Levemir 16 units every morning.    Wednesday and Thursday take 18 units instead.

## 2017-12-27 NOTE — MR AVS SNAPSHOT
After Visit Summary   12/27/2017    Roc Parkinson    MRN: [id number]           Patient Information     Date Of Birth          7/7/1926        Visit Information        Provider Department      12/27/2017 2:00 PM Gretel Quinn MD [facility]        Care Instructions    1.  Discontinue cyclophosphamide, dexamethasone.  Start Carfilzomib/Dexamethasone- chemoe education today  2.  Zometa every 12 weeks  3. Continue Aranesp 300 mcg SQ every three weeks  4- RTC MD 2 weeks  5- Start Acyclovir          Follow-ups after your visit        Your next 10 appointments already scheduled     Jan 03, 2018 11:00 AM CST   Level 2 with  INFUSION CHAIR 16   [facility] ([facility])    [facility]  [address]  [place] [address]   [phone]            Jan 04, 2018 11:00 AM CST   Level 2 with  INFUSION CHAIR 4   [facility] ([facility])    [facility] [place] [id number]  [facility] [address]   [phone]            Jan 08, 2018  8:30 AM CST   PHYSICAL with Dickson Reich MD   [facility] ([facility])    [address] [place] [address]   [phone]            Ye 10, 2018  9:45 AM CST   Return Visit with  Oncology Nurse   [facility] ([facility])    [facility]  [address]  [facility] [address]   [phone]            Ye 10, 2018 10:15 AM CST   Return Visit with Gretel Quinn MD   [facility] ([facility])    [facility]  [address]  [facility] [address]   [phone]            Ye 10, 2018 10:30 AM CST   Level 2 with  INFUSION CHAIR 2   [facility] ([facility])    [facility]  [address]  [place] [address]   [phone]            Jan 11, 2018 10:30 AM CST   Level 2 with SH INFUSION CHAIR 8   [facility] ([facility])    [facility] [place]  [address]  [place] [address]   [phone]            Jan 23, 2018 10:30 AM CST   Diabetic Education with  DIABETIC ED RESOURCE   [facility]   [place] [facility]    [address] [address]   [phone]            Jan 31, 2018 11:30 AM CST   Level 1 with  INFUSION CHAIR 16   [facility] ([facility])    [facility]  [address]  [place] [address]   [phone]            Jan 31, 2018 11:45 AM CST   Return Visit with Gretel Quinn MD   [facility] ([facility])    [place] [address]  [place] [address]   [phone]              Who to contact     If you have questions or need follow up information about today's clinic visit or your schedule please contact [facility] directly at [phone].  Normal or non-critical lab and imaging results will be communicated to you by Careemhart, letter or phone within 4 business days after the clinic has received the results. If you do not hear from us within 7 days, please contact the clinic through Careemhart or phone. If you have a critical or abnormal lab result, we will notify you by phone as soon as possible.  Submit refill requests through AirXP or call your pharmacy and they will forward the refill request to us. Please allow 3 business days for your refill to be completed.          Additional Information About Your Visit        CareemharKofikafe Information     AirXP lets you send messages to your doctor, view your test results, renew your prescriptions,  "schedule appointments and more. To sign up, go to [email or web address] . Click on \"Log in\" on the left side of the screen, which will take you to the Welcome page. Then click on \"Sign up Now\" on the right side of the page.     You will be asked to enter the access code listed below, as well as some personal information. Please follow the directions to create your username and password.     Your access code is: [email or web address]  Expires: 3/26/2018 11:10 AM     Your access code will  in 90 days. If you need help or a new code, please call your [facility] clinic or [phone].        Care EveryWhere ID     This is your Care EveryWhere ID. This could be used by other organizations to access your [facility] medical records  [id number]        Your Vitals Were     Pulse Respirations Pulse Oximetry BMI (Body Mass Index)          75 20 98% 29.7 kg/m2         Blood Pressure from Last 3 Encounters:   17 138/68   17 121/58   17 121/58    Weight from Last 3 Encounters:   17 93.9 kg (207 lb)   17 92.4 kg (203 lb 9.6 oz)   17 90.7 kg (200 lb)              Today, you had the following     No orders found for display         Today's Medication Changes          These changes are accurate as of: 17  4:07 PM.  If you have any questions, ask your nurse or doctor.               Stop taking these medicines if you haven't already. Please contact your care team if you have questions.     cyclophosphamide 50 MG capsule CHEMO   Commonly known as:  CYTOXAN   Stopped by:  Gretel Quinn MD           dexamethasone 4 MG tablet   Commonly known as:  DECADRON   Stopped by:  Gretel Quinn MD           prochlorperazine 5 MG tablet   Commonly known as:  COMPAZINE   Stopped by:  Gretel Quinn MD                    Primary Care Provider Office Phone # Fax #    Dickson Clark Reich [phone] [address] [facility] [address]        Equal Access to Services     [facility] KALIN AH: " Hadii aad ku hademelyo Somaeali, waaxda luqadaha, qaybta kaalmada paddy, melita carmelin hayaan vipinsadie jacobson laburkejanny michael. So [facility] [phone].    ATENCIÓN: Si renaldo stephens, tiene a nolan disposición servicios gratuitos de asistencia lingüística. Llame al [phone].    We comply with applicable federal civil rights laws and [place] laws. We do not discriminate on the basis of race, color, national origin, age, disability, sex, sexual orientation, or gender identity.            Thank you!     Thank you for choosing [facility]  for your care. Our goal is always to provide you with excellent care. Hearing back from our patients is one way we can continue to improve our services. Please take a few minutes to complete the written survey that you may receive in the mail after your visit with us. Thank you!             Your Updated Medication List - Protect others around you: Learn how to safely use, store and throw away your medicines at www.disposemymeds.org.          This list is accurate as of: 12/27/17  4:07 PM.  Always use your most recent med list.                   Brand Name Dispense Instructions for use Diagnosis    acyclovir 400 MG tablet    ZOVIRAX    60 tablet    Take 1 tablet (400 mg) by mouth 2 times daily Viral Prophylaxis.    Multiple myeloma not having achieved remission (H)       amLODIPine 5 MG tablet    NORVASC    30 tablet    TAKE 1 TABLET BY MOUTH DAILY    Essential hypertension, benign       * B-D U/F 31G X 8 MM   Generic drug:  insulin pen needle           * B-D U/F 31G X 8 MM   Generic drug:  insulin pen needle     500 each    USE 5 PEN NEEDLES PER DAY OR AS DIRECTED    Type 2 diabetes mellitus with stage 3 chronic kidney disease, with long-term current use of insulin (H)       ferrous sulfate 325 (65 FE) MG tablet    IRON     Take 325 mg by mouth daily (with breakfast)        FREESTYLE LITE test strip   Generic drug:  blood glucose monitoring     200 strip    USE 1 STRIP TO TEST TWICE  DAILY.    Uncontrolled type 1 diabetes mellitus with stage 3 chronic kidney disease (H)       furosemide 20 MG tablet    LASIX    30 tablet    Take 1 tablet (20 mg) by mouth daily    Multiple myeloma not having achieved remission (H)       gemfibrozil 600 MG tablet    LOPID    180 tablet    TAKE 1 TABLET BY MOUTH TWICE DAILY    Hyperlipidemia       glipiZIDE 10 MG tablet    GLUCOTROL    180 tablet    TAKE 1 TABLET BY MOUTH TWICE DAILY BEFORE A MEAL    Type 2 diabetes mellitus with stage 3 chronic kidney disease, with long-term current use of insulin (H)       * insulin aspart 100 UNIT/ML injection    NovoLOG PEN    12 mL    Inject 11 units before breakfast, 10 units before lunch, 9 units before dinner, and 5 units at bedtime. Before meal correction: 140-175 add 1 unit  176-210 add 2 units  211-245 add 3 units  246-280 add 4 units  281-315 add 5 units 316-350 add 6 units  351-385 add 7 units 386-420 add 8 units  Over 420 add 9 units    Uncontrolled type 2 diabetes mellitus with hyperglycemia, unspecified long term insulin use status (H)       * NovoLOG FLEXPEN 100 UNIT/ML injection   Generic drug:  insulin aspart     15 mL    ADMINISTER 1 TO 5 UNITS UNDER THE SKIN FOUR TIMES DAILY WITH MEALS AND NIGHTLY    Uncontrolled type 2 diabetes mellitus with hyperglycemia, unspecified long term insulin use status (H)       insulin detemir 100 UNIT/ML injection    LEVEMIR FLEXPEN/FLEXTOUCH    15 mL    Inject 14 Units Subcutaneous every morning (before breakfast) On Wednesday and Thursday morning, inject 16 units.    Type 2 diabetes mellitus with diabetic chronic kidney disease, unspecified CKD stage, unspecified long term insulin use status (H)       levothyroxine 25 MCG tablet    SYNTHROID/LEVOTHROID    90 tablet    TAKE 1 TABLET BY MOUTH EVERY DAY    Acquired hypothyroidism       * lisinopril 30 MG tablet    PRINIVIL,ZESTRIL    90 tablet    TAKE 1 TABLET BY MOUTH EVERY DAY    Essential hypertension, benign       * lisinopril  30 MG tablet    PRINIVIL,ZESTRIL    90 tablet    Take 0.5 tablets (15 mg) by mouth daily    Essential hypertension, benign       metoprolol 25 MG tablet    LOPRESSOR    180 tablet    TAKE 1 TABLET BY MOUTH TWICE DAILY    Essential hypertension       omeprazole 20 MG CR capsule    priLOSEC    90 capsule    TAKE 1 CAPSULE BY MOUTH EVERY DAY    Congenital hiatus hernia       ondansetron 8 MG tablet    ZOFRAN    10 tablet    Take 1 tablet (8 mg) by mouth every 8 hours as needed (Nausea/Vomiting)    Multiple myeloma not having achieved remission (H)       polyethylene glycol powder    MIRALAX/GLYCOLAX          Selenium 200 MCG Tabs      Take 100 mcg by mouth daily. Pt takes one half tab of the 200 mcg daily        tamsulosin 0.4 MG capsule    FLOMAX    90 capsule    TAKE 1 CAPSULE BY MOUTH DAILY    Malignant neoplasm of prostate (H)       VITAMIN C PO      Take 1,000 mg by mouth daily        VITAMIN D3 PO      Take 1,000 Units by mouth daily        * Notice:  This list has 6 medication(s) that are the same as other medications prescribed for you. Read the directions carefully, and ask your doctor or other care provider to review them with you.

## 2017-12-27 NOTE — LETTER
"    12/27/2017         RE: Roc Parkinson  [address]   [facility] [address]        Dear Colleague,    Thank you for referring your patient, Roc Parkinson, to the [facility]. Please see a copy of my visit note below.    Oncology Rooming Note    December 27, 2017 1:46 PM   Roc Parkinson is a 89 year old male who presents for:    Chief Complaint   Patient presents with     Oncology Clinic Visit     RETURN- Multiple myeloma not having achieved remission     Initial Vitals: /68 (BP Location: Left arm, Patient Position: Chair, Cuff Size: Adult Regular)  Pulse 75  Resp 20  Wt 93.9 kg (207 lb)  SpO2 98%  BMI 29.7 kg/m2 Estimated body mass index is 29.7 kg/(m^2) as calculated from the following:    Height as of 12/1/17: 1.778 m (5' 10\").    Weight as of this encounter: 93.9 kg (207 lb). Body surface area is 2.15 meters squared.  No Pain (0) Comment: Data Unavailable   No LMP for male patient.  Allergies reviewed: Yes  Medications reviewed: Yes    Medications: Medication refills not needed today.  Pharmacy name entered into iPayment:    [facility] [id number] [place] [address] [address] AT [address] PHARMACY [facility] [address]  [place] [facility] [place], [place] - [address]    Clinical concerns: none     5 minutes for nursing intake (face to face time)     Regla Strong [facility]  HEMATOLOGY ONCOLOGY     DIAGNOSIS:    1- Kappa light chain IgM multiple myeloma in a 89-year-old patient.  Bone marrow biopsy on 11/17/2016 showed hypercellular bone marrow with 65% cellularity of light chain restricted plasma cells.  FISH or G-banding could not be performed due to insufficient sample.   There is a background of myelodysplastic syndrome.  The patient was initially seen in the hospital on 11/17/2016 for macrocytic anemia and pancytopenia and transfusion requirement " and a bone marrow biopsy was performed.   2- History of prostate cancer s/p EBRT s/p brachytherapy in 2003 (recent PSA 0.10), superficial bladder cancer ,no recurrences since 1986     TREATMENT: 12/15/16 velcade/dex. 4/12/17 added cyclophosphamide 300 mg weekly.6/28/17 we discontinued velcade due to concern for neuropathy and continued with weekly cyclophosphamide and dexamethasone.      SUBJECTIVE:  The patient was seen as a followup today. He comes in today to discuss his next treatment options.     REVIEW OF SYSTEMS:  A complete review of systems was performed and found to be negative other than pertinent positives mentioned in history of present illness.     Past medical, social histories reviewed.    Meds- Reviewed.     PHYSICAL EXAMINATION:   VITAL SIGNS:/68 (BP Location: Left arm, Patient Position: Chair, Cuff Size: Adult Regular)  Pulse 75  Resp 20  Wt 93.9 kg (207 lb)  SpO2 98%  BMI 29.7 kg/m2  CONSTITUTIONAL: Appears tired.   NEUROLOGIC: Alert, awake.   PSYCH: Anxious     LABORATORY DATA AND IMAGING REVIEWED DURING THIS VISIT:  Recent Labs   Lab Test  12/20/17   1100  11/29/17   1130   NA  136  136   POTASSIUM  4.3  4.6   CHLORIDE  105  106   CO2  21  21   ANIONGAP  10  9   BUN  30  32*   CR  1.11  1.19   GLC  193*  236*   MICHELLE  9.4  9.5     Recent Labs   Lab Test  12/20/17   1100  11/29/17   1130  11/08/17   1118   WBC  3.4*  5.5  6.1   HGB  8.0*  8.4*  10.8*   PLT  132*  143*  142*   MCV  111*  110*  109*   NEUTROPHIL  91.1  90.5  90.0     Recent Labs   Lab Test  12/20/17   1100  11/29/17   1130  11/08/17   1118   11/17/16   0938   BILITOTAL  0.5  0.5  0.5   < >   --    ALKPHOS  83  87  72   < >   --    ALT  12  14  17   < >   --    AST  11  15  13   < >   --    ALBUMIN  2.7*  2.8*  3.1*   < >   --    LDH   --    --    --    --   110    < > = values in this interval not displayed.     Results for JACOB MEDELLIN (MRN [id number]) as of 12/27/2017 15:10   Ref. Range 9/27/2017 09:55 10/18/2017  12:30 12/20/2017 11:00   Gamma Fraction Latest Ref Range: 0.7 - 1.6 g/dL 1.6 1.6 2.0 (H)   IGA Latest Ref Range: 70 - 380 mg/dL 20 (L) 26 (L) 40 (L)   IGG Latest Ref Range: 695 - 1620 mg/dL 323 (L) 311 (L) 306 (L)   IGM Latest Ref Range: 60 - 265 mg/dL 2230 (H) 2200 (H) 2670 (H)   Immunofixation ELP Unknown (Note) (Note) (Note)   Kappa Free Lt Chain Latest Ref Range: 0.33 - 1.94 mg/dL 55.00 (H) 55.25 (H) 198.00 (H)   Kappa Lambda Ratio Latest Ref Range: 0.26 - 1.65  54.46 (H) 63.51 (H) 309.38 (H)   Lambda Free Lt Chain Latest Ref Range: 0.57 - 2.63 mg/dL 1.01 0.87 0.64   Monoclonal Peak Latest Ref Range: 0.0 g/dL 1.3 (H) 1.2 (H) 1.7 (H)     ECOG PS: 1    ASSESSMENT:  This is a 89-year-old gentleman who has kappa light chain multiple myeloma with hypercellular marrow with 65% of cells are light chain restricted plasma cells.  He had severe pancytopenia and has needed a transfusion in the hospital.  He did not have hypercalcemia and his renal function was normal. He has a background of myelodysplastic syndrome on his bone marrow biopsy; however, majority of the cell population was monoclonal plasma cell population.   He was started on treatment due to cytopenia.   In 4/2017 his light chain levels were getting worse. M spike was stable. We added cyclophosphamide to the regimen. In 6/2017 discontinued velcade for concern of development of neuropathy, in hindsight the pain appeared to be related to arthritis.     - He is on Aranesp 300 mcg SQ every three weeks.    - Labs last week showed worsening cytopenia. Myeloma labs were drawn and results were reviewed with the patient today.   He has worsening of M spike and light chains. His worsening cytopenia is related to progression of myeloma on current treatment. I discussed about stopping cyclophosphamide and dexamethasone and starting combination treatment with Carfilzomib and dexamethasone. We had a detailed discussion about each chemotherapy agent, intent of treatment  and also potential risks and benefits. We discussed the adverse effects related to each of chemotherapy agent. Patient asked questions and expressed willingness to proceed with the treatment.     PLAN:   1.  Discontinue cyclophosphamide, dexamethasone.  Start Carfilzomib/Dexamethasone- chemoe education today  2.  Zometa every 12 weeks  3. Continue Aranesp 300 mcg SQ every three weeks  4- RTC MD 2 weeks  5- Start Acyclovir    GRETEL QUINN MD    12/27/2017        Again, thank you for allowing me to participate in the care of your patient.        Sincerely,        Gretel Quinn MD

## 2017-12-27 NOTE — PROGRESS NOTES
"Oncology Rooming Note    December 27, 2017 1:46 PM   Roc Parkinson is a 89 year old male who presents for:    Chief Complaint   Patient presents with     Oncology Clinic Visit     RETURN- Multiple myeloma not having achieved remission     Initial Vitals: /68 (BP Location: Left arm, Patient Position: Chair, Cuff Size: Adult Regular)  Pulse 75  Resp 20  Wt 93.9 kg (207 lb)  SpO2 98%  BMI 29.7 kg/m2 Estimated body mass index is 29.7 kg/(m^2) as calculated from the following:    Height as of 12/1/17: 1.778 m (5' 10\").    Weight as of this encounter: 93.9 kg (207 lb). Body surface area is 2.15 meters squared.  No Pain (0) Comment: Data Unavailable   No LMP for male patient.  Allergies reviewed: Yes  Medications reviewed: Yes    Medications: Medication refills not needed today.  Pharmacy name entered into [employer]:    [facility] [id number] [place] [address] [address] AT [facility] [address] [facility]ACY [facility] [address]  [place] [facility] [place], [place] - [address]    Clinical concerns: none     5 minutes for nursing intake (face to face time)     Regla Strong CMA            "

## 2017-12-27 NOTE — PROGRESS NOTES
[facility] PHYSICIANS  HEMATOLOGY ONCOLOGY     DIAGNOSIS:    1- Kappa light chain IgM multiple myeloma in a 89-year-old patient.  Bone marrow biopsy on 11/17/2016 showed hypercellular bone marrow with 65% cellularity of light chain restricted plasma cells.  FISH or G-banding could not be performed due to insufficient sample.   There is a background of myelodysplastic syndrome.  The patient was initially seen in the hospital on 11/17/2016 for macrocytic anemia and pancytopenia and transfusion requirement and a bone marrow biopsy was performed.   2- History of prostate cancer s/p EBRT s/p brachytherapy in 2003 (recent PSA 0.10), superficial bladder cancer ,no recurrences since 1986     TREATMENT: 12/15/16 velcade/dex. 4/12/17 added cyclophosphamide 300 mg weekly.6/28/17 we discontinued velcade due to concern for neuropathy and continued with weekly cyclophosphamide and dexamethasone.      SUBJECTIVE:  The patient was seen as a followup today. He comes in today to discuss his next treatment options.     REVIEW OF SYSTEMS:  A complete review of systems was performed and found to be negative other than pertinent positives mentioned in history of present illness.     Past medical, social histories reviewed.    Meds- Reviewed.     PHYSICAL EXAMINATION:   VITAL SIGNS:/68 (BP Location: Left arm, Patient Position: Chair, Cuff Size: Adult Regular)  Pulse 75  Resp 20  Wt 93.9 kg (207 lb)  SpO2 98%  BMI 29.7 kg/m2  CONSTITUTIONAL: Appears tired.   NEUROLOGIC: Alert, awake.   PSYCH: Anxious     LABORATORY DATA AND IMAGING REVIEWED DURING THIS VISIT:  Recent Labs   Lab Test  12/20/17   1100  11/29/17   1130   NA  136  136   POTASSIUM  4.3  4.6   CHLORIDE  105  106   CO2  21  21   ANIONGAP  10  9   BUN  30  32*   CR  1.11  1.19   GLC  193*  236*   MICHELLE  9.4  9.5     Recent Labs   Lab Test  12/20/17   1100  11/29/17   1130  11/08/17   1118   WBC  3.4*  5.5  6.1   HGB  8.0*  8.4*  10.8*   PLT  132*  143*  142*    MCV  111*  110*  109*   NEUTROPHIL  91.1  90.5  90.0     Recent Labs   Lab Test  12/20/17   1100  11/29/17   1130  11/08/17   1118   11/17/16   0938   BILITOTAL  0.5  0.5  0.5   < >   --    ALKPHOS  83  87  72   < >   --    ALT  12  14  17   < >   --    AST  11  15  13   < >   --    ALBUMIN  2.7*  2.8*  3.1*   < >   --    LDH   --    --    --    --   110    < > = values in this interval not displayed.     Results for JACOB MEDELLIN (MRN [id number]) as of 12/27/2017 15:10   Ref. Range 9/27/2017 09:55 10/18/2017 12:30 12/20/2017 11:00   Gamma Fraction Latest Ref Range: 0.7 - 1.6 g/dL 1.6 1.6 2.0 (H)   IGA Latest Ref Range: 70 - 380 mg/dL 20 (L) 26 (L) 40 (L)   IGG Latest Ref Range: 695 - 1620 mg/dL 323 (L) 311 (L) 306 (L)   IGM Latest Ref Range: 60 - 265 mg/dL 2230 (H) 2200 (H) 2670 (H)   Immunofixation ELP Unknown (Note) (Note) (Note)   Kappa Free Lt Chain Latest Ref Range: 0.33 - 1.94 mg/dL 55.00 (H) 55.25 (H) 198.00 (H)   Kappa Lambda Ratio Latest Ref Range: 0.26 - 1.65  54.46 (H) 63.51 (H) 309.38 (H)   Lambda Free Lt Chain Latest Ref Range: 0.57 - 2.63 mg/dL 1.01 0.87 0.64   Monoclonal Peak Latest Ref Range: 0.0 g/dL 1.3 (H) 1.2 (H) 1.7 (H)     ECOG PS: 1    ASSESSMENT:  This is a 89-year-old gentleman who has kappa light chain multiple myeloma with hypercellular marrow with 65% of cells are light chain restricted plasma cells.  He had severe pancytopenia and has needed a transfusion in the hospital.  He did not have hypercalcemia and his renal function was normal. He has a background of myelodysplastic syndrome on his bone marrow biopsy; however, majority of the cell population was monoclonal plasma cell population.   He was started on treatment due to cytopenia.   In 4/2017 his light chain levels were getting worse. M spike was stable. We added cyclophosphamide to the regimen. In 6/2017 discontinued velcade for concern of development of neuropathy, in hindsight the pain appeared to be related to arthritis.      - He is on Aranesp 300 mcg SQ every three weeks.    - Labs last week showed worsening cytopenia. Myeloma labs were drawn and results were reviewed with the patient today.   He has worsening of M spike and light chains. His worsening cytopenia is related to progression of myeloma on current treatment. I discussed about stopping cyclophosphamide and dexamethasone and starting combination treatment with Carfilzomib and dexamethasone. We had a detailed discussion about each chemotherapy agent, intent of treatment and also potential risks and benefits. We discussed the adverse effects related to each of chemotherapy agent. Patient asked questions and expressed willingness to proceed with the treatment.     PLAN:   1.  Discontinue cyclophosphamide, dexamethasone.  Start Carfilzomib/Dexamethasone- chemoe education today  2.  Zometa every 12 weeks  3. Continue Aranesp 300 mcg SQ every three weeks  4- RTC MD 2 weeks  5- Start Acyclovir    ALISON JON MD    12/27/2017

## 2017-12-27 NOTE — PATIENT INSTRUCTIONS
1.  Discontinue cyclophosphamide, dexamethasone.  Start Carfilzomib/Dexamethasone- chemoe education today  2.  Zometa every 12 weeks  Scheduled/Janice  3. Continue Aranesp 300 mcg SQ every three weeks Scheduled/janice  4- RTC MD 2 weeks  Scheduled/janice  5- Start Acyclovir    AVS printed & given to patient/Janice

## 2017-12-28 NOTE — TELEPHONE ENCOUNTER
Spoke to Roc to confirm that he discontinued evening Glucotrol pill due to overnight hypoglycemia. He did.     Updated this and other meds in med list.     Roc tells me that his chemo is changing:  He expects to split his dose of pills, rather than 10 on Wednesday, to take 5 each on Wednesday and Thursday.   He tells me he will also begin IV chemo 6 days per week.    We agree we will continue the current diabetes care plan and assess when he has f/u with PCP and me in January.  Kalyn Almanzar RD, LD, CDE

## 2017-12-28 NOTE — PROGRESS NOTES
Diabetes Follow-up    Subjective/Objective:    Roc Parkinson sent in blood glucose log for review. Last date of communication was: 11/07.    Diabetes is being managed with Injectable Medications: Levemir 14 units before breakfast (except 16 units on Wed. & Thurs.)   and NovoLog Insulin base dose of 12-10-9-4 and sliding scale:    If blood sugar is                 Take Novolog  140-175                                1 unit extra   176-210                                2 units extra  211-245                                3 units extra    246-280                                4 units extra   281-315                                5 units extra   316-350                                6 units extra   351-385                                7 units extra    386-420                                8 units extra  Over 420                               9 units extra      BG/Food Log:       Assessment of past 10 readings at each time:    Fasting blood glucose: 60% in target.  Before lunch glucose: 0% in target.  Before dinner glucose: 20% in target.  Bedtime glucose: 20% in target.    Wednesday and Thursday continue to be elevated with chemo pills taken on Wednesday.    Plan/Response:  See Patient Instructions for co-developed, patient-stated behavior change goals.  Recommend increase to Novolog base dose at breakfast and lunch from 12-10-9-4 --> 13-11-9-4.  Also increase to morning Levemir for background coverage from 14 units before breakfast to 16 units before breakfast (and 18 on chemo days).    Roc would benefit from CGM for closer assessment with variable blood sugars. Will ask PCP.    He is scheduled for f/u with PCP 1/8/18, and CDE 1/23/18.    Reviewed again importance of being cautious with increases to avoid low BG. Roc can identify and treat lows.    Kalyn Almanzar RD, LD, CDE      Any diabetes medication dose changes were made via the CDE Protocol and Collaborative Practice Agreement with the  patient's referring provider. A copy of this encounter was shared with the provider.

## 2018-01-01 ENCOUNTER — HOSPITAL ENCOUNTER (OUTPATIENT)
Facility: CLINIC | Age: 83
Setting detail: SPECIMEN
Discharge: HOME | End: 2018-05-11
Attending: INTERNAL MEDICINE | Admitting: INTERNAL MEDICINE
Payer: MEDICARE

## 2018-01-01 ENCOUNTER — TELEPHONE (OUTPATIENT)
Dept: ONCOLOGY | Facility: CLINIC | Age: 83
End: 2018-01-01

## 2018-01-01 ENCOUNTER — HOSPITAL ENCOUNTER (OUTPATIENT)
Facility: CLINIC | Age: 83
Setting detail: SPECIMEN
Discharge: HOME | End: 2018-05-02
Attending: INTERNAL MEDICINE | Admitting: INTERNAL MEDICINE
Payer: MEDICARE

## 2018-01-01 ENCOUNTER — NURSING HOME VISIT (OUTPATIENT)
Dept: GERIATRICS | Facility: CLINIC | Age: 83
End: 2018-01-01
Payer: COMMERCIAL

## 2018-01-01 ENCOUNTER — ONCOLOGY VISIT (OUTPATIENT)
Dept: ONCOLOGY | Facility: CLINIC | Age: 83
End: 2018-01-01
Attending: INTERNAL MEDICINE
Payer: MEDICARE

## 2018-01-01 ENCOUNTER — HOSPITAL ENCOUNTER (OUTPATIENT)
Facility: CLINIC | Age: 83
Setting detail: SPECIMEN
Discharge: HOME | End: 2018-03-07
Attending: INTERNAL MEDICINE | Admitting: INTERNAL MEDICINE
Payer: MEDICARE

## 2018-01-01 ENCOUNTER — PATIENT OUTREACH (OUTPATIENT)
Dept: CARE COORDINATION | Facility: CLINIC | Age: 83
End: 2018-01-01

## 2018-01-01 ENCOUNTER — TELEPHONE (OUTPATIENT)
Dept: EDUCATION SERVICES | Facility: CLINIC | Age: 83
End: 2018-01-01

## 2018-01-01 ENCOUNTER — INFUSION THERAPY VISIT (OUTPATIENT)
Dept: INFUSION THERAPY | Facility: CLINIC | Age: 83
End: 2018-01-01
Attending: INTERNAL MEDICINE
Payer: MEDICARE

## 2018-01-01 ENCOUNTER — HOSPITAL ENCOUNTER (OUTPATIENT)
Facility: CLINIC | Age: 83
Setting detail: SPECIMEN
Discharge: HOME | End: 2018-05-17
Attending: INTERNAL MEDICINE | Admitting: INTERNAL MEDICINE
Payer: MEDICARE

## 2018-01-01 ENCOUNTER — APPOINTMENT (OUTPATIENT)
Dept: PHYSICAL THERAPY | Facility: CLINIC | Age: 83
DRG: 689 | End: 2018-01-01
Attending: INTERNAL MEDICINE
Payer: MEDICARE

## 2018-01-01 ENCOUNTER — DOCUMENTATION ONLY (OUTPATIENT)
Dept: PHARMACY | Facility: CLINIC | Age: 83
End: 2018-01-01

## 2018-01-01 ENCOUNTER — HOSPITAL ENCOUNTER (OUTPATIENT)
Facility: CLINIC | Age: 83
Setting detail: SPECIMEN
DRG: 308 | End: 2018-03-22
Attending: INTERNAL MEDICINE
Payer: MEDICARE

## 2018-01-01 ENCOUNTER — HOSPITAL ENCOUNTER (OUTPATIENT)
Facility: CLINIC | Age: 83
Setting detail: SPECIMEN
Discharge: HOME | End: 2018-03-14
Attending: INTERNAL MEDICINE | Admitting: INTERNAL MEDICINE
Payer: MEDICARE

## 2018-01-01 ENCOUNTER — HOSPITAL ENCOUNTER (OUTPATIENT)
Facility: CLINIC | Age: 83
Setting detail: SPECIMEN
Discharge: HOME | End: 2018-05-14
Attending: INTERNAL MEDICINE | Admitting: INTERNAL MEDICINE
Payer: MEDICARE

## 2018-01-01 ENCOUNTER — HOSPITAL ENCOUNTER (OUTPATIENT)
Facility: CLINIC | Age: 83
Setting detail: SPECIMEN
End: 2018-03-08
Attending: INTERNAL MEDICINE
Payer: MEDICARE

## 2018-01-01 ENCOUNTER — APPOINTMENT (OUTPATIENT)
Dept: OCCUPATIONAL THERAPY | Facility: CLINIC | Age: 83
DRG: 308 | End: 2018-01-01
Attending: INTERNAL MEDICINE
Payer: MEDICARE

## 2018-01-01 ENCOUNTER — APPOINTMENT (OUTPATIENT)
Dept: GENERAL RADIOLOGY | Facility: CLINIC | Age: 83
DRG: 871 | End: 2018-01-01
Attending: EMERGENCY MEDICINE
Payer: MEDICARE

## 2018-01-01 ENCOUNTER — HOSPITAL ENCOUNTER (OUTPATIENT)
Facility: CLINIC | Age: 83
Setting detail: SPECIMEN
DRG: 308 | End: 2018-03-23
Attending: INTERNAL MEDICINE
Payer: MEDICARE

## 2018-01-01 ENCOUNTER — APPOINTMENT (OUTPATIENT)
Dept: OCCUPATIONAL THERAPY | Facility: CLINIC | Age: 83
DRG: 871 | End: 2018-01-01
Payer: MEDICARE

## 2018-01-01 ENCOUNTER — TELEPHONE (OUTPATIENT)
Dept: FAMILY MEDICINE | Facility: CLINIC | Age: 83
End: 2018-01-01

## 2018-01-01 ENCOUNTER — APPOINTMENT (OUTPATIENT)
Dept: GENERAL RADIOLOGY | Facility: CLINIC | Age: 83
DRG: 689 | End: 2018-01-01
Attending: EMERGENCY MEDICINE
Payer: MEDICARE

## 2018-01-01 ENCOUNTER — ALLIED HEALTH/NURSE VISIT (OUTPATIENT)
Dept: ONCOLOGY | Facility: CLINIC | Age: 83
End: 2018-01-01

## 2018-01-01 ENCOUNTER — HOSPITAL ENCOUNTER (OUTPATIENT)
Facility: CLINIC | Age: 83
Setting detail: SPECIMEN
End: 2018-05-15
Attending: INTERNAL MEDICINE
Payer: MEDICARE

## 2018-01-01 ENCOUNTER — HOSPITAL ENCOUNTER (OUTPATIENT)
Facility: CLINIC | Age: 83
Setting detail: SPECIMEN
End: 2018-05-30
Attending: INTERNAL MEDICINE
Payer: MEDICARE

## 2018-01-01 ENCOUNTER — OFFICE VISIT (OUTPATIENT)
Dept: FAMILY MEDICINE | Facility: CLINIC | Age: 83
End: 2018-01-01
Payer: COMMERCIAL

## 2018-01-01 ENCOUNTER — HOSPITAL ENCOUNTER (OUTPATIENT)
Facility: CLINIC | Age: 83
Setting detail: SPECIMEN
End: 2018-05-23
Attending: INTERNAL MEDICINE
Payer: MEDICARE

## 2018-01-01 ENCOUNTER — HOSPITAL ENCOUNTER (OUTPATIENT)
Facility: CLINIC | Age: 83
Setting detail: SPECIMEN
Discharge: HOME | End: 2018-02-07
Attending: INTERNAL MEDICINE | Admitting: INTERNAL MEDICINE
Payer: MEDICARE

## 2018-01-01 ENCOUNTER — APPOINTMENT (OUTPATIENT)
Dept: OCCUPATIONAL THERAPY | Facility: CLINIC | Age: 83
DRG: 871 | End: 2018-01-01
Attending: INTERNAL MEDICINE
Payer: MEDICARE

## 2018-01-01 ENCOUNTER — HOSPITAL ENCOUNTER (INPATIENT)
Facility: CLINIC | Age: 83
LOS: 3 days | Discharge: NURSING, REHAB OR LONG-TERM CARE | DRG: 689 | End: 2018-01-23
Attending: EMERGENCY MEDICINE | Admitting: INTERNAL MEDICINE
Payer: MEDICARE

## 2018-01-01 ENCOUNTER — HOSPITAL ENCOUNTER (OUTPATIENT)
Facility: CLINIC | Age: 83
Setting detail: SPECIMEN
End: 2018-05-13
Attending: INTERNAL MEDICINE
Payer: MEDICARE

## 2018-01-01 ENCOUNTER — HOSPITAL ENCOUNTER (OUTPATIENT)
Facility: CLINIC | Age: 83
Setting detail: SPECIMEN
Discharge: HOME | DRG: 689 | End: 2018-05-09
Attending: INTERNAL MEDICINE
Payer: MEDICARE

## 2018-01-01 ENCOUNTER — HOSPITAL ENCOUNTER (OUTPATIENT)
Facility: CLINIC | Age: 83
Setting detail: SPECIMEN
End: 2018-05-18
Attending: INTERNAL MEDICINE
Payer: MEDICARE

## 2018-01-01 ENCOUNTER — TELEPHONE (OUTPATIENT)
Dept: INFUSION THERAPY | Facility: CLINIC | Age: 83
End: 2018-01-01

## 2018-01-01 ENCOUNTER — APPOINTMENT (OUTPATIENT)
Dept: SPEECH THERAPY | Facility: CLINIC | Age: 83
DRG: 308 | End: 2018-01-01
Attending: INTERNAL MEDICINE
Payer: MEDICARE

## 2018-01-01 ENCOUNTER — APPOINTMENT (OUTPATIENT)
Dept: CT IMAGING | Facility: CLINIC | Age: 83
DRG: 871 | End: 2018-01-01
Attending: INTERNAL MEDICINE
Payer: MEDICARE

## 2018-01-01 ENCOUNTER — HOSPITAL ENCOUNTER (OUTPATIENT)
Dept: CARDIOLOGY | Facility: CLINIC | Age: 83
Discharge: HOME | End: 2018-01-11
Attending: INTERNAL MEDICINE | Admitting: INTERNAL MEDICINE
Payer: MEDICARE

## 2018-01-01 ENCOUNTER — CARE COORDINATION (OUTPATIENT)
Dept: CARE COORDINATION | Facility: CLINIC | Age: 83
End: 2018-01-01

## 2018-01-01 ENCOUNTER — HOSPITAL ENCOUNTER (OUTPATIENT)
Facility: CLINIC | Age: 83
Setting detail: SPECIMEN
Discharge: HOME | End: 2018-04-25
Attending: INTERNAL MEDICINE | Admitting: INTERNAL MEDICINE
Payer: MEDICARE

## 2018-01-01 ENCOUNTER — APPOINTMENT (OUTPATIENT)
Dept: CT IMAGING | Facility: CLINIC | Age: 83
End: 2018-01-01
Attending: NURSE PRACTITIONER
Payer: MEDICARE

## 2018-01-01 ENCOUNTER — APPOINTMENT (OUTPATIENT)
Dept: CARDIOLOGY | Facility: CLINIC | Age: 83
DRG: 308 | End: 2018-01-01
Attending: INTERNAL MEDICINE
Payer: MEDICARE

## 2018-01-01 ENCOUNTER — HOSPITAL ENCOUNTER (OUTPATIENT)
Facility: CLINIC | Age: 83
Setting detail: SPECIMEN
End: 2018-05-12
Attending: INTERNAL MEDICINE
Payer: MEDICARE

## 2018-01-01 ENCOUNTER — HOSPITAL ENCOUNTER (OUTPATIENT)
Facility: CLINIC | Age: 83
Setting detail: SPECIMEN
Discharge: HOME | End: 2018-04-18
Attending: INTERNAL MEDICINE | Admitting: INTERNAL MEDICINE
Payer: MEDICARE

## 2018-01-01 ENCOUNTER — OFFICE VISIT (OUTPATIENT)
Dept: URGENT CARE | Facility: URGENT CARE | Age: 83
End: 2018-01-01
Payer: COMMERCIAL

## 2018-01-01 ENCOUNTER — HOSPITAL ENCOUNTER (OUTPATIENT)
Facility: CLINIC | Age: 83
Setting detail: SPECIMEN
End: 2018-05-20
Attending: INTERNAL MEDICINE
Payer: MEDICARE

## 2018-01-01 ENCOUNTER — APPOINTMENT (OUTPATIENT)
Dept: GENERAL RADIOLOGY | Facility: CLINIC | Age: 83
DRG: 308 | End: 2018-01-01
Attending: INTERNAL MEDICINE
Payer: MEDICARE

## 2018-01-01 ENCOUNTER — HOSPITAL ENCOUNTER (OUTPATIENT)
Facility: CLINIC | Age: 83
Setting detail: SPECIMEN
Discharge: HOME | End: 2018-02-14
Attending: INTERNAL MEDICINE | Admitting: INTERNAL MEDICINE
Payer: MEDICARE

## 2018-01-01 ENCOUNTER — APPOINTMENT (OUTPATIENT)
Dept: OCCUPATIONAL THERAPY | Facility: CLINIC | Age: 83
DRG: 689 | End: 2018-01-01
Payer: MEDICARE

## 2018-01-01 ENCOUNTER — HOSPITAL ENCOUNTER (OUTPATIENT)
Facility: CLINIC | Age: 83
Setting detail: SPECIMEN
End: 2018-02-22
Attending: INTERNAL MEDICINE
Payer: MEDICARE

## 2018-01-01 ENCOUNTER — HOSPITAL ENCOUNTER (OUTPATIENT)
Facility: CLINIC | Age: 83
Setting detail: SPECIMEN
Discharge: HOME | End: 2018-04-05
Attending: INTERNAL MEDICINE | Admitting: INTERNAL MEDICINE
Payer: MEDICARE

## 2018-01-01 ENCOUNTER — APPOINTMENT (OUTPATIENT)
Dept: PHYSICAL THERAPY | Facility: CLINIC | Age: 83
DRG: 871 | End: 2018-01-01
Attending: INTERNAL MEDICINE
Payer: MEDICARE

## 2018-01-01 ENCOUNTER — HOSPITAL ENCOUNTER (OUTPATIENT)
Facility: CLINIC | Age: 83
Setting detail: SPECIMEN
Discharge: HOME | End: 2018-02-21
Attending: INTERNAL MEDICINE | Admitting: INTERNAL MEDICINE
Payer: MEDICARE

## 2018-01-01 ENCOUNTER — HOSPITAL ENCOUNTER (INPATIENT)
Facility: CLINIC | Age: 83
LOS: 5 days | Discharge: NURSING, REHAB OR LONG-TERM CARE | DRG: 308 | End: 2018-03-27
Attending: EMERGENCY MEDICINE | Admitting: INTERNAL MEDICINE
Payer: MEDICARE

## 2018-01-01 ENCOUNTER — HOSPITAL ENCOUNTER (OUTPATIENT)
Facility: CLINIC | Age: 83
Setting detail: SPECIMEN
Discharge: HOME | End: 2018-05-16
Attending: INTERNAL MEDICINE | Admitting: INTERNAL MEDICINE
Payer: MEDICARE

## 2018-01-01 ENCOUNTER — HOSPITAL ENCOUNTER (OUTPATIENT)
Facility: CLINIC | Age: 83
Setting detail: SPECIMEN
Discharge: HOME | End: 2018-01-03
Attending: INTERNAL MEDICINE | Admitting: INTERNAL MEDICINE
Payer: MEDICARE

## 2018-01-01 ENCOUNTER — HOSPITAL ENCOUNTER (INPATIENT)
Facility: CLINIC | Age: 83
LOS: 3 days | Discharge: HOME | DRG: 689 | End: 2018-05-10
Attending: EMERGENCY MEDICINE | Admitting: HOSPITALIST
Payer: MEDICARE

## 2018-01-01 ENCOUNTER — CARE COORDINATION (OUTPATIENT)
Dept: ONCOLOGY | Facility: CLINIC | Age: 83
End: 2018-01-01

## 2018-01-01 ENCOUNTER — APPOINTMENT (OUTPATIENT)
Dept: SPEECH THERAPY | Facility: CLINIC | Age: 83
DRG: 871 | End: 2018-01-01
Attending: INTERNAL MEDICINE
Payer: MEDICARE

## 2018-01-01 ENCOUNTER — TRANSFERRED RECORDS (OUTPATIENT)
Dept: HEALTH INFORMATION MANAGEMENT | Facility: CLINIC | Age: 83
End: 2018-01-01

## 2018-01-01 ENCOUNTER — HOSPITAL ENCOUNTER (OUTPATIENT)
Facility: CLINIC | Age: 83
Setting detail: SPECIMEN
Discharge: HOME | DRG: 689 | End: 2018-01-17
Attending: INTERNAL MEDICINE | Admitting: INTERNAL MEDICINE
Payer: MEDICARE

## 2018-01-01 ENCOUNTER — HOSPITAL ENCOUNTER (OUTPATIENT)
Facility: CLINIC | Age: 83
Setting detail: SPECIMEN
End: 2018-03-29
Attending: INTERNAL MEDICINE
Payer: MEDICARE

## 2018-01-01 ENCOUNTER — TELEPHONE (OUTPATIENT)
Dept: PHARMACY | Facility: CLINIC | Age: 83
End: 2018-01-01

## 2018-01-01 ENCOUNTER — HOSPITAL ENCOUNTER (OUTPATIENT)
Facility: CLINIC | Age: 83
Setting detail: SPECIMEN
Discharge: HOME | End: 2018-04-11
Attending: INTERNAL MEDICINE | Admitting: INTERNAL MEDICINE
Payer: MEDICARE

## 2018-01-01 ENCOUNTER — NURSING HOME VISIT (OUTPATIENT)
Dept: GERIATRICS | Facility: CLINIC | Age: 83
End: 2018-01-01

## 2018-01-01 ENCOUNTER — HOSPITAL ENCOUNTER (OUTPATIENT)
Facility: CLINIC | Age: 83
Setting detail: SPECIMEN
Discharge: HOME | End: 2018-01-10
Attending: INTERNAL MEDICINE | Admitting: INTERNAL MEDICINE
Payer: MEDICARE

## 2018-01-01 ENCOUNTER — NURSE TRIAGE (OUTPATIENT)
Dept: NURSING | Facility: CLINIC | Age: 83
End: 2018-01-01

## 2018-01-01 ENCOUNTER — DISCHARGE SUMMARY NURSING HOME (OUTPATIENT)
Dept: GERIATRICS | Facility: CLINIC | Age: 83
End: 2018-01-01
Payer: COMMERCIAL

## 2018-01-01 ENCOUNTER — HOSPITAL ENCOUNTER (EMERGENCY)
Facility: CLINIC | Age: 83
Discharge: HOME | End: 2018-05-14
Attending: NURSE PRACTITIONER | Admitting: NURSE PRACTITIONER
Payer: MEDICARE

## 2018-01-01 ENCOUNTER — HOSPITAL ENCOUNTER (INPATIENT)
Facility: CLINIC | Age: 83
LOS: 7 days | Discharge: HOSPICE | DRG: 871 | End: 2018-05-30
Attending: EMERGENCY MEDICINE | Admitting: INTERNAL MEDICINE
Payer: MEDICARE

## 2018-01-01 ENCOUNTER — HOSPITAL ENCOUNTER (OUTPATIENT)
Facility: CLINIC | Age: 83
Setting detail: SPECIMEN
Discharge: HOME | End: 2018-01-04
Attending: INTERNAL MEDICINE | Admitting: INTERNAL MEDICINE
Payer: MEDICARE

## 2018-01-01 ENCOUNTER — HOSPITAL ENCOUNTER (OUTPATIENT)
Facility: CLINIC | Age: 83
Setting detail: SPECIMEN
End: 2018-05-19
Attending: INTERNAL MEDICINE
Payer: MEDICARE

## 2018-01-01 ENCOUNTER — APPOINTMENT (OUTPATIENT)
Dept: PHYSICAL THERAPY | Facility: CLINIC | Age: 83
DRG: 871 | End: 2018-01-01
Payer: MEDICARE

## 2018-01-01 ENCOUNTER — APPOINTMENT (OUTPATIENT)
Dept: PHYSICAL THERAPY | Facility: CLINIC | Age: 83
DRG: 308 | End: 2018-01-01
Attending: INTERNAL MEDICINE
Payer: MEDICARE

## 2018-01-01 ENCOUNTER — HOSPITAL ENCOUNTER (OUTPATIENT)
Facility: CLINIC | Age: 83
Setting detail: SPECIMEN
Discharge: HOME | End: 2018-01-31
Attending: INTERNAL MEDICINE | Admitting: INTERNAL MEDICINE
Payer: MEDICARE

## 2018-01-01 VITALS
RESPIRATION RATE: 18 BRPM | HEART RATE: 80 BPM | SYSTOLIC BLOOD PRESSURE: 122 MMHG | TEMPERATURE: 98.2 F | DIASTOLIC BLOOD PRESSURE: 62 MMHG | WEIGHT: 202.82 LBS | HEIGHT: 68 IN | BODY MASS INDEX: 30.74 KG/M2

## 2018-01-01 VITALS
HEART RATE: 78 BPM | RESPIRATION RATE: 16 BRPM | WEIGHT: 207.8 LBS | OXYGEN SATURATION: 97 % | BODY MASS INDEX: 29.75 KG/M2 | HEIGHT: 70 IN | DIASTOLIC BLOOD PRESSURE: 83 MMHG | TEMPERATURE: 97.8 F | SYSTOLIC BLOOD PRESSURE: 166 MMHG

## 2018-01-01 VITALS
HEIGHT: 70 IN | BODY MASS INDEX: 29.35 KG/M2 | RESPIRATION RATE: 16 BRPM | HEART RATE: 69 BPM | TEMPERATURE: 98.1 F | SYSTOLIC BLOOD PRESSURE: 143 MMHG | RESPIRATION RATE: 16 BRPM | WEIGHT: 205 LBS | BODY MASS INDEX: 29.63 KG/M2 | HEIGHT: 70 IN | HEART RATE: 72 BPM | DIASTOLIC BLOOD PRESSURE: 60 MMHG | OXYGEN SATURATION: 96 % | SYSTOLIC BLOOD PRESSURE: 155 MMHG | DIASTOLIC BLOOD PRESSURE: 64 MMHG | TEMPERATURE: 97.6 F | WEIGHT: 206.98 LBS

## 2018-01-01 VITALS
HEART RATE: 78 BPM | OXYGEN SATURATION: 95 % | BODY MASS INDEX: 31.8 KG/M2 | HEIGHT: 67 IN | WEIGHT: 202.6 LBS | RESPIRATION RATE: 18 BRPM | TEMPERATURE: 97.5 F | SYSTOLIC BLOOD PRESSURE: 148 MMHG | DIASTOLIC BLOOD PRESSURE: 82 MMHG

## 2018-01-01 VITALS
HEART RATE: 80 BPM | DIASTOLIC BLOOD PRESSURE: 77 MMHG | OXYGEN SATURATION: 97 % | SYSTOLIC BLOOD PRESSURE: 166 MMHG | TEMPERATURE: 97.7 F | RESPIRATION RATE: 20 BRPM

## 2018-01-01 VITALS
BODY MASS INDEX: 29.56 KG/M2 | DIASTOLIC BLOOD PRESSURE: 57 MMHG | OXYGEN SATURATION: 95 % | WEIGHT: 206 LBS | HEART RATE: 70 BPM | SYSTOLIC BLOOD PRESSURE: 104 MMHG | RESPIRATION RATE: 18 BRPM | TEMPERATURE: 97.7 F

## 2018-01-01 VITALS
OXYGEN SATURATION: 98 % | WEIGHT: 205 LBS | TEMPERATURE: 98 F | DIASTOLIC BLOOD PRESSURE: 60 MMHG | HEART RATE: 80 BPM | BODY MASS INDEX: 29.41 KG/M2 | RESPIRATION RATE: 16 BRPM | SYSTOLIC BLOOD PRESSURE: 124 MMHG

## 2018-01-01 VITALS
RESPIRATION RATE: 16 BRPM | DIASTOLIC BLOOD PRESSURE: 71 MMHG | HEART RATE: 77 BPM | HEART RATE: 70 BPM | RESPIRATION RATE: 16 BRPM | HEIGHT: 70 IN | TEMPERATURE: 99.5 F | SYSTOLIC BLOOD PRESSURE: 149 MMHG | OXYGEN SATURATION: 97 % | SYSTOLIC BLOOD PRESSURE: 117 MMHG | WEIGHT: 205.2 LBS | BODY MASS INDEX: 29.38 KG/M2 | DIASTOLIC BLOOD PRESSURE: 64 MMHG | TEMPERATURE: 97.8 F

## 2018-01-01 VITALS
RESPIRATION RATE: 16 BRPM | SYSTOLIC BLOOD PRESSURE: 120 MMHG | DIASTOLIC BLOOD PRESSURE: 60 MMHG | TEMPERATURE: 97.2 F | HEART RATE: 77 BPM | WEIGHT: 200 LBS | BODY MASS INDEX: 30.41 KG/M2

## 2018-01-01 VITALS
RESPIRATION RATE: 16 BRPM | HEART RATE: 71 BPM | SYSTOLIC BLOOD PRESSURE: 124 MMHG | DIASTOLIC BLOOD PRESSURE: 60 MMHG | OXYGEN SATURATION: 96 % | BODY MASS INDEX: 29 KG/M2 | WEIGHT: 195.8 LBS | HEIGHT: 69 IN | TEMPERATURE: 97.8 F

## 2018-01-01 VITALS
OXYGEN SATURATION: 95 % | TEMPERATURE: 97 F | RESPIRATION RATE: 18 BRPM | HEIGHT: 67 IN | WEIGHT: 199.8 LBS | HEART RATE: 70 BPM | BODY MASS INDEX: 31.36 KG/M2 | SYSTOLIC BLOOD PRESSURE: 116 MMHG | DIASTOLIC BLOOD PRESSURE: 65 MMHG

## 2018-01-01 VITALS
SYSTOLIC BLOOD PRESSURE: 130 MMHG | HEART RATE: 80 BPM | TEMPERATURE: 97.8 F | WEIGHT: 204.6 LBS | BODY MASS INDEX: 29.36 KG/M2 | OXYGEN SATURATION: 95 % | DIASTOLIC BLOOD PRESSURE: 60 MMHG

## 2018-01-01 VITALS
DIASTOLIC BLOOD PRESSURE: 64 MMHG | WEIGHT: 209 LBS | SYSTOLIC BLOOD PRESSURE: 118 MMHG | OXYGEN SATURATION: 96 % | HEART RATE: 85 BPM | RESPIRATION RATE: 16 BRPM | TEMPERATURE: 99.7 F | HEIGHT: 70 IN | BODY MASS INDEX: 29.92 KG/M2

## 2018-01-01 VITALS
HEART RATE: 74 BPM | DIASTOLIC BLOOD PRESSURE: 68 MMHG | SYSTOLIC BLOOD PRESSURE: 153 MMHG | RESPIRATION RATE: 16 BRPM | BODY MASS INDEX: 30.8 KG/M2 | TEMPERATURE: 98.7 F | OXYGEN SATURATION: 96 % | HEIGHT: 68 IN | WEIGHT: 203.2 LBS

## 2018-01-01 VITALS
TEMPERATURE: 98.8 F | RESPIRATION RATE: 18 BRPM | DIASTOLIC BLOOD PRESSURE: 84 MMHG | SYSTOLIC BLOOD PRESSURE: 170 MMHG | WEIGHT: 202.6 LBS | HEIGHT: 67 IN | OXYGEN SATURATION: 94 % | BODY MASS INDEX: 31.8 KG/M2 | HEART RATE: 82 BPM

## 2018-01-01 VITALS
HEART RATE: 78 BPM | SYSTOLIC BLOOD PRESSURE: 112 MMHG | TEMPERATURE: 97.6 F | RESPIRATION RATE: 16 BRPM | DIASTOLIC BLOOD PRESSURE: 53 MMHG

## 2018-01-01 VITALS
OXYGEN SATURATION: 95 % | HEART RATE: 83 BPM | SYSTOLIC BLOOD PRESSURE: 119 MMHG | WEIGHT: 204.8 LBS | TEMPERATURE: 97.8 F | DIASTOLIC BLOOD PRESSURE: 68 MMHG | RESPIRATION RATE: 18 BRPM | BODY MASS INDEX: 32.08 KG/M2

## 2018-01-01 VITALS
OXYGEN SATURATION: 96 % | DIASTOLIC BLOOD PRESSURE: 89 MMHG | TEMPERATURE: 97.6 F | RESPIRATION RATE: 16 BRPM | BODY MASS INDEX: 29.66 KG/M2 | SYSTOLIC BLOOD PRESSURE: 169 MMHG | HEART RATE: 69 BPM | HEIGHT: 70 IN | WEIGHT: 207.2 LBS

## 2018-01-01 VITALS
SYSTOLIC BLOOD PRESSURE: 126 MMHG | TEMPERATURE: 99.3 F | OXYGEN SATURATION: 98 % | RESPIRATION RATE: 16 BRPM | DIASTOLIC BLOOD PRESSURE: 70 MMHG | HEART RATE: 70 BPM

## 2018-01-01 VITALS
OXYGEN SATURATION: 94 % | SYSTOLIC BLOOD PRESSURE: 100 MMHG | RESPIRATION RATE: 16 BRPM | WEIGHT: 201 LBS | BODY MASS INDEX: 30.56 KG/M2 | DIASTOLIC BLOOD PRESSURE: 52 MMHG | TEMPERATURE: 98.4 F | HEART RATE: 78 BPM

## 2018-01-01 VITALS — WEIGHT: 204.15 LBS | HEIGHT: 68 IN | BODY MASS INDEX: 30.94 KG/M2

## 2018-01-01 VITALS
BODY MASS INDEX: 29.62 KG/M2 | HEIGHT: 69 IN | TEMPERATURE: 98.4 F | DIASTOLIC BLOOD PRESSURE: 75 MMHG | OXYGEN SATURATION: 97 % | WEIGHT: 200 LBS | SYSTOLIC BLOOD PRESSURE: 146 MMHG

## 2018-01-01 VITALS — RESPIRATION RATE: 16 BRPM | HEART RATE: 70 BPM | DIASTOLIC BLOOD PRESSURE: 72 MMHG | SYSTOLIC BLOOD PRESSURE: 143 MMHG

## 2018-01-01 VITALS
SYSTOLIC BLOOD PRESSURE: 156 MMHG | RESPIRATION RATE: 16 BRPM | HEART RATE: 84 BPM | TEMPERATURE: 97.4 F | DIASTOLIC BLOOD PRESSURE: 67 MMHG

## 2018-01-01 VITALS
SYSTOLIC BLOOD PRESSURE: 112 MMHG | RESPIRATION RATE: 16 BRPM | HEART RATE: 81 BPM | TEMPERATURE: 97.8 F | DIASTOLIC BLOOD PRESSURE: 56 MMHG

## 2018-01-01 VITALS
RESPIRATION RATE: 16 BRPM | DIASTOLIC BLOOD PRESSURE: 69 MMHG | SYSTOLIC BLOOD PRESSURE: 129 MMHG | HEART RATE: 62 BPM | OXYGEN SATURATION: 96 % | TEMPERATURE: 97.9 F

## 2018-01-01 VITALS
BODY MASS INDEX: 28.49 KG/M2 | RESPIRATION RATE: 18 BRPM | OXYGEN SATURATION: 94 % | TEMPERATURE: 97.6 F | DIASTOLIC BLOOD PRESSURE: 83 MMHG | SYSTOLIC BLOOD PRESSURE: 160 MMHG | HEART RATE: 66 BPM | HEIGHT: 70 IN | WEIGHT: 199 LBS

## 2018-01-01 VITALS
WEIGHT: 204.2 LBS | HEART RATE: 66 BPM | BODY MASS INDEX: 29.23 KG/M2 | HEIGHT: 70 IN | TEMPERATURE: 97.9 F | SYSTOLIC BLOOD PRESSURE: 152 MMHG | RESPIRATION RATE: 16 BRPM | OXYGEN SATURATION: 95 % | DIASTOLIC BLOOD PRESSURE: 75 MMHG

## 2018-01-01 VITALS
HEIGHT: 69 IN | WEIGHT: 201.72 LBS | BODY MASS INDEX: 29.88 KG/M2 | TEMPERATURE: 98.6 F | OXYGEN SATURATION: 93 % | HEART RATE: 78 BPM | DIASTOLIC BLOOD PRESSURE: 75 MMHG | SYSTOLIC BLOOD PRESSURE: 165 MMHG | RESPIRATION RATE: 16 BRPM

## 2018-01-01 VITALS — BODY MASS INDEX: 31.64 KG/M2 | WEIGHT: 202 LBS

## 2018-01-01 VITALS
TEMPERATURE: 98 F | HEIGHT: 70 IN | DIASTOLIC BLOOD PRESSURE: 72 MMHG | WEIGHT: 202.6 LBS | SYSTOLIC BLOOD PRESSURE: 144 MMHG | HEART RATE: 72 BPM | OXYGEN SATURATION: 94 % | BODY MASS INDEX: 29.01 KG/M2 | RESPIRATION RATE: 16 BRPM

## 2018-01-01 VITALS
BODY MASS INDEX: 29.47 KG/M2 | HEART RATE: 100 BPM | SYSTOLIC BLOOD PRESSURE: 110 MMHG | TEMPERATURE: 97.8 F | WEIGHT: 205.4 LBS | RESPIRATION RATE: 16 BRPM | DIASTOLIC BLOOD PRESSURE: 62 MMHG

## 2018-01-01 VITALS
WEIGHT: 206 LBS | TEMPERATURE: 97.6 F | SYSTOLIC BLOOD PRESSURE: 141 MMHG | HEART RATE: 64 BPM | BODY MASS INDEX: 29.49 KG/M2 | RESPIRATION RATE: 16 BRPM | DIASTOLIC BLOOD PRESSURE: 67 MMHG | HEIGHT: 70 IN

## 2018-01-01 VITALS
HEIGHT: 70 IN | RESPIRATION RATE: 16 BRPM | DIASTOLIC BLOOD PRESSURE: 67 MMHG | TEMPERATURE: 98.1 F | HEART RATE: 141 BPM | SYSTOLIC BLOOD PRESSURE: 143 MMHG | WEIGHT: 210 LBS | OXYGEN SATURATION: 96 % | HEART RATE: 80 BPM | OXYGEN SATURATION: 97 % | TEMPERATURE: 98.3 F | SYSTOLIC BLOOD PRESSURE: 149 MMHG | RESPIRATION RATE: 20 BRPM | BODY MASS INDEX: 30.06 KG/M2 | DIASTOLIC BLOOD PRESSURE: 85 MMHG

## 2018-01-01 VITALS
RESPIRATION RATE: 14 BRPM | BODY MASS INDEX: 30.86 KG/M2 | WEIGHT: 203.6 LBS | SYSTOLIC BLOOD PRESSURE: 131 MMHG | DIASTOLIC BLOOD PRESSURE: 67 MMHG | TEMPERATURE: 98.2 F | HEART RATE: 73 BPM | HEIGHT: 68 IN

## 2018-01-01 VITALS
WEIGHT: 203.6 LBS | SYSTOLIC BLOOD PRESSURE: 120 MMHG | HEIGHT: 68 IN | TEMPERATURE: 98.2 F | HEART RATE: 76 BPM | BODY MASS INDEX: 30.86 KG/M2 | DIASTOLIC BLOOD PRESSURE: 72 MMHG | RESPIRATION RATE: 14 BRPM

## 2018-01-01 VITALS
BODY MASS INDEX: 29.48 KG/M2 | HEIGHT: 70 IN | HEART RATE: 82 BPM | OXYGEN SATURATION: 96 % | RESPIRATION RATE: 16 BRPM | DIASTOLIC BLOOD PRESSURE: 83 MMHG | TEMPERATURE: 97.8 F | SYSTOLIC BLOOD PRESSURE: 159 MMHG | WEIGHT: 205.91 LBS

## 2018-01-01 VITALS
HEART RATE: 83 BPM | BODY MASS INDEX: 29.44 KG/M2 | RESPIRATION RATE: 20 BRPM | DIASTOLIC BLOOD PRESSURE: 65 MMHG | OXYGEN SATURATION: 93 % | TEMPERATURE: 98.8 F | WEIGHT: 205.2 LBS | SYSTOLIC BLOOD PRESSURE: 124 MMHG

## 2018-01-01 VITALS
TEMPERATURE: 98.6 F | RESPIRATION RATE: 16 BRPM | DIASTOLIC BLOOD PRESSURE: 62 MMHG | HEART RATE: 75 BPM | WEIGHT: 202.4 LBS | SYSTOLIC BLOOD PRESSURE: 127 MMHG | BODY MASS INDEX: 29.89 KG/M2 | OXYGEN SATURATION: 99 %

## 2018-01-01 VITALS
SYSTOLIC BLOOD PRESSURE: 164 MMHG | TEMPERATURE: 98.4 F | WEIGHT: 205.8 LBS | OXYGEN SATURATION: 94 % | HEIGHT: 70 IN | DIASTOLIC BLOOD PRESSURE: 78 MMHG | RESPIRATION RATE: 16 BRPM | HEART RATE: 78 BPM | BODY MASS INDEX: 29.46 KG/M2

## 2018-01-01 VITALS
HEART RATE: 83 BPM | OXYGEN SATURATION: 93 % | WEIGHT: 207.01 LBS | RESPIRATION RATE: 16 BRPM | SYSTOLIC BLOOD PRESSURE: 128 MMHG | DIASTOLIC BLOOD PRESSURE: 56 MMHG | TEMPERATURE: 99.2 F | HEIGHT: 70 IN | BODY MASS INDEX: 29.64 KG/M2

## 2018-01-01 VITALS
RESPIRATION RATE: 16 BRPM | TEMPERATURE: 97.4 F | DIASTOLIC BLOOD PRESSURE: 61 MMHG | HEART RATE: 80 BPM | SYSTOLIC BLOOD PRESSURE: 130 MMHG

## 2018-01-01 VITALS
BODY MASS INDEX: 29.46 KG/M2 | HEIGHT: 70 IN | DIASTOLIC BLOOD PRESSURE: 78 MMHG | HEART RATE: 78 BPM | SYSTOLIC BLOOD PRESSURE: 145 MMHG | WEIGHT: 205.8 LBS

## 2018-01-01 VITALS
SYSTOLIC BLOOD PRESSURE: 110 MMHG | BODY MASS INDEX: 29.47 KG/M2 | HEART RATE: 100 BPM | RESPIRATION RATE: 16 BRPM | WEIGHT: 205.4 LBS | TEMPERATURE: 97.8 F | DIASTOLIC BLOOD PRESSURE: 60 MMHG

## 2018-01-01 VITALS
TEMPERATURE: 97.6 F | HEART RATE: 75 BPM | DIASTOLIC BLOOD PRESSURE: 65 MMHG | RESPIRATION RATE: 16 BRPM | SYSTOLIC BLOOD PRESSURE: 146 MMHG

## 2018-01-01 VITALS — WEIGHT: 202.4 LBS | BODY MASS INDEX: 29.98 KG/M2 | HEIGHT: 69 IN

## 2018-01-01 VITALS
DIASTOLIC BLOOD PRESSURE: 76 MMHG | TEMPERATURE: 97.8 F | RESPIRATION RATE: 16 BRPM | HEART RATE: 69 BPM | SYSTOLIC BLOOD PRESSURE: 163 MMHG

## 2018-01-01 VITALS
OXYGEN SATURATION: 98 % | DIASTOLIC BLOOD PRESSURE: 56 MMHG | HEART RATE: 74 BPM | SYSTOLIC BLOOD PRESSURE: 133 MMHG | TEMPERATURE: 97.9 F | RESPIRATION RATE: 16 BRPM

## 2018-01-01 VITALS
OXYGEN SATURATION: 100 % | HEART RATE: 74 BPM | SYSTOLIC BLOOD PRESSURE: 121 MMHG | WEIGHT: 203.2 LBS | DIASTOLIC BLOOD PRESSURE: 68 MMHG | RESPIRATION RATE: 24 BRPM | BODY MASS INDEX: 30.8 KG/M2 | TEMPERATURE: 96.8 F | HEIGHT: 68 IN

## 2018-01-01 VITALS
HEART RATE: 62 BPM | SYSTOLIC BLOOD PRESSURE: 138 MMHG | DIASTOLIC BLOOD PRESSURE: 70 MMHG | BODY MASS INDEX: 29.52 KG/M2 | RESPIRATION RATE: 16 BRPM | TEMPERATURE: 97 F | WEIGHT: 200 LBS

## 2018-01-01 VITALS
SYSTOLIC BLOOD PRESSURE: 124 MMHG | DIASTOLIC BLOOD PRESSURE: 65 MMHG | WEIGHT: 205.2 LBS | RESPIRATION RATE: 20 BRPM | BODY MASS INDEX: 29.38 KG/M2 | TEMPERATURE: 98.8 F | HEIGHT: 70 IN | OXYGEN SATURATION: 93 % | HEART RATE: 83 BPM

## 2018-01-01 VITALS — DIASTOLIC BLOOD PRESSURE: 71 MMHG | TEMPERATURE: 98.3 F | RESPIRATION RATE: 16 BRPM | SYSTOLIC BLOOD PRESSURE: 155 MMHG

## 2018-01-01 VITALS
DIASTOLIC BLOOD PRESSURE: 68 MMHG | TEMPERATURE: 98.7 F | SYSTOLIC BLOOD PRESSURE: 153 MMHG | HEART RATE: 74 BPM | WEIGHT: 203.2 LBS | OXYGEN SATURATION: 96 % | BODY MASS INDEX: 30.9 KG/M2 | RESPIRATION RATE: 16 BRPM

## 2018-01-01 DIAGNOSIS — E11.22 TYPE 2 DIABETES MELLITUS WITH STAGE 3 CHRONIC KIDNEY DISEASE, WITH LONG-TERM CURRENT USE OF INSULIN (H): ICD-10-CM

## 2018-01-01 DIAGNOSIS — Z79.4 TYPE 2 DIABETES MELLITUS WITH STAGE 3 CHRONIC KIDNEY DISEASE, WITH LONG-TERM CURRENT USE OF INSULIN (H): Primary | Chronic | ICD-10-CM

## 2018-01-01 DIAGNOSIS — D53.9 MACROCYTIC ANEMIA: ICD-10-CM

## 2018-01-01 DIAGNOSIS — C90.00 MULTIPLE MYELOMA NOT HAVING ACHIEVED REMISSION (H): Primary | ICD-10-CM

## 2018-01-01 DIAGNOSIS — T45.1X5A CHEMOTHERAPY-INDUCED NEUTROPENIA (H): ICD-10-CM

## 2018-01-01 DIAGNOSIS — T45.1X5A CHEMOTHERAPY-INDUCED NEUTROPENIA (H): Primary | ICD-10-CM

## 2018-01-01 DIAGNOSIS — C90.00 MULTIPLE MYELOMA NOT HAVING ACHIEVED REMISSION (H): ICD-10-CM

## 2018-01-01 DIAGNOSIS — N18.30 CKD (CHRONIC KIDNEY DISEASE) STAGE 3, GFR 30-59 ML/MIN (H): Chronic | ICD-10-CM

## 2018-01-01 DIAGNOSIS — Z53.9 ERRONEOUS ENCOUNTER--DISREGARD: Primary | ICD-10-CM

## 2018-01-01 DIAGNOSIS — Z79.4 TYPE 2 DIABETES MELLITUS WITH STAGE 3 CHRONIC KIDNEY DISEASE, WITH LONG-TERM CURRENT USE OF INSULIN (H): ICD-10-CM

## 2018-01-01 DIAGNOSIS — I47.19 ATRIAL TACHYCARDIA, PAROXYSMAL (H): ICD-10-CM

## 2018-01-01 DIAGNOSIS — E11.65 UNCONTROLLED TYPE 2 DIABETES MELLITUS WITH HYPERGLYCEMIA, UNSPECIFIED LONG TERM INSULIN USE STATUS: ICD-10-CM

## 2018-01-01 DIAGNOSIS — K59.01 SLOW TRANSIT CONSTIPATION: ICD-10-CM

## 2018-01-01 DIAGNOSIS — T83.511D URINARY TRACT INFECTION ASSOCIATED WITH INDWELLING URETHRAL CATHETER, SUBSEQUENT ENCOUNTER: ICD-10-CM

## 2018-01-01 DIAGNOSIS — I10 ESSENTIAL HYPERTENSION: ICD-10-CM

## 2018-01-01 DIAGNOSIS — D46.9 MDS (MYELODYSPLASTIC SYNDROME) (H): Primary | ICD-10-CM

## 2018-01-01 DIAGNOSIS — D46.9 MDS (MYELODYSPLASTIC SYNDROME) (H): ICD-10-CM

## 2018-01-01 DIAGNOSIS — J18.9 PNEUMONIA OF RIGHT LUNG DUE TO INFECTIOUS ORGANISM, UNSPECIFIED PART OF LUNG: ICD-10-CM

## 2018-01-01 DIAGNOSIS — N39.0 UTI DUE TO EXTENDED-SPECTRUM BETA LACTAMASE (ESBL) PRODUCING ESCHERICHIA COLI: ICD-10-CM

## 2018-01-01 DIAGNOSIS — E11.9 TYPE 2 DIABETES MELLITUS WITHOUT COMPLICATION, WITH LONG-TERM CURRENT USE OF INSULIN (H): ICD-10-CM

## 2018-01-01 DIAGNOSIS — R53.81 PHYSICAL DECONDITIONING: Primary | ICD-10-CM

## 2018-01-01 DIAGNOSIS — N39.0 URINARY TRACT INFECTION ASSOCIATED WITH INDWELLING URETHRAL CATHETER, SUBSEQUENT ENCOUNTER: ICD-10-CM

## 2018-01-01 DIAGNOSIS — E11.22 TYPE 2 DIABETES MELLITUS WITH STAGE 3 CHRONIC KIDNEY DISEASE, WITH LONG-TERM CURRENT USE OF INSULIN (H): Chronic | ICD-10-CM

## 2018-01-01 DIAGNOSIS — E11.22 TYPE 2 DIABETES MELLITUS WITH DIABETIC CHRONIC KIDNEY DISEASE, UNSPECIFIED CKD STAGE, UNSPECIFIED LONG TERM INSULIN USE STATUS: ICD-10-CM

## 2018-01-01 DIAGNOSIS — N30.01 ACUTE HEMORRHAGIC CYSTITIS: Primary | ICD-10-CM

## 2018-01-01 DIAGNOSIS — R05.9 COUGH: ICD-10-CM

## 2018-01-01 DIAGNOSIS — I48.91 ATRIAL FIBRILLATION WITH RAPID VENTRICULAR RESPONSE (H): ICD-10-CM

## 2018-01-01 DIAGNOSIS — I48.20 CHRONIC ATRIAL FIBRILLATION (H): ICD-10-CM

## 2018-01-01 DIAGNOSIS — Z71.9 COUNSELING NOS(V65.40): Primary | ICD-10-CM

## 2018-01-01 DIAGNOSIS — Z79.4 TYPE 2 DIABETES MELLITUS WITH STAGE 3 CHRONIC KIDNEY DISEASE, WITH LONG-TERM CURRENT USE OF INSULIN (H): Chronic | ICD-10-CM

## 2018-01-01 DIAGNOSIS — Z79.4 TYPE 2 DIABETES MELLITUS WITHOUT COMPLICATION, WITH LONG-TERM CURRENT USE OF INSULIN (H): ICD-10-CM

## 2018-01-01 DIAGNOSIS — D63.0 ANEMIA IN NEOPLASTIC DISEASE: ICD-10-CM

## 2018-01-01 DIAGNOSIS — Z16.12 UTI DUE TO EXTENDED-SPECTRUM BETA LACTAMASE (ESBL) PRODUCING ESCHERICHIA COLI: ICD-10-CM

## 2018-01-01 DIAGNOSIS — C61 MALIGNANT NEOPLASM OF PROSTATE (H): ICD-10-CM

## 2018-01-01 DIAGNOSIS — E03.9 ACQUIRED HYPOTHYROIDISM: ICD-10-CM

## 2018-01-01 DIAGNOSIS — J18.9 PNEUMONIA OF BOTH LOWER LOBES DUE TO INFECTIOUS ORGANISM: ICD-10-CM

## 2018-01-01 DIAGNOSIS — T50.2X5A DIURETIC-INDUCED HYPOKALEMIA: ICD-10-CM

## 2018-01-01 DIAGNOSIS — I10 ESSENTIAL HYPERTENSION, BENIGN: ICD-10-CM

## 2018-01-01 DIAGNOSIS — B96.29 UTI DUE TO EXTENDED-SPECTRUM BETA LACTAMASE (ESBL) PRODUCING ESCHERICHIA COLI: ICD-10-CM

## 2018-01-01 DIAGNOSIS — D63.0 ANEMIA IN NEOPLASTIC DISEASE: Primary | ICD-10-CM

## 2018-01-01 DIAGNOSIS — E83.52 HYPERCALCEMIA: ICD-10-CM

## 2018-01-01 DIAGNOSIS — R30.0 DYSURIA: ICD-10-CM

## 2018-01-01 DIAGNOSIS — M62.81 GENERALIZED MUSCLE WEAKNESS: ICD-10-CM

## 2018-01-01 DIAGNOSIS — N18.30 CKD (CHRONIC KIDNEY DISEASE) STAGE 3, GFR 30-59 ML/MIN (H): Primary | Chronic | ICD-10-CM

## 2018-01-01 DIAGNOSIS — R53.1 GENERALIZED WEAKNESS: ICD-10-CM

## 2018-01-01 DIAGNOSIS — R41.0 DELIRIUM: ICD-10-CM

## 2018-01-01 DIAGNOSIS — N18.30 TYPE 2 DIABETES MELLITUS WITH STAGE 3 CHRONIC KIDNEY DISEASE, WITH LONG-TERM CURRENT USE OF INSULIN (H): ICD-10-CM

## 2018-01-01 DIAGNOSIS — C61 MALIGNANT NEOPLASM OF PROSTATE (H): Primary | ICD-10-CM

## 2018-01-01 DIAGNOSIS — E11.22 TYPE 2 DIABETES MELLITUS WITH STAGE 3 CHRONIC KIDNEY DISEASE, WITH LONG-TERM CURRENT USE OF INSULIN (H): Primary | Chronic | ICD-10-CM

## 2018-01-01 DIAGNOSIS — E87.6 DIURETIC-INDUCED HYPOKALEMIA: ICD-10-CM

## 2018-01-01 DIAGNOSIS — N39.0 URINARY TRACT INFECTION WITHOUT HEMATURIA, SITE UNSPECIFIED: Primary | ICD-10-CM
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
## 2018-01-01 DIAGNOSIS — N30.01 ACUTE HEMORRHAGIC CYSTITIS: ICD-10-CM

## 2018-01-01 DIAGNOSIS — R53.81 PHYSICAL DECONDITIONING: ICD-10-CM

## 2018-01-01 DIAGNOSIS — E03.9 HYPOTHYROIDISM, UNSPECIFIED TYPE: ICD-10-CM

## 2018-01-01 DIAGNOSIS — T45.1X5A ANTINEOPLASTIC CHEMOTHERAPY INDUCED PANCYTOPENIA (H): ICD-10-CM

## 2018-01-01 DIAGNOSIS — N18.30 TYPE 2 DIABETES MELLITUS WITH STAGE 3 CHRONIC KIDNEY DISEASE, WITH LONG-TERM CURRENT USE OF INSULIN (H): Primary | Chronic | ICD-10-CM

## 2018-01-01 DIAGNOSIS — D61.810 ANTINEOPLASTIC CHEMOTHERAPY INDUCED PANCYTOPENIA (H): ICD-10-CM

## 2018-01-01 DIAGNOSIS — K21.9 GASTROESOPHAGEAL REFLUX DISEASE, ESOPHAGITIS PRESENCE NOT SPECIFIED: ICD-10-CM

## 2018-01-01 DIAGNOSIS — N18.30 TYPE 2 DIABETES MELLITUS WITH STAGE 3 CHRONIC KIDNEY DISEASE, WITH LONG-TERM CURRENT USE OF INSULIN (H): Chronic | ICD-10-CM

## 2018-01-01 DIAGNOSIS — R42 DIZZINESS: ICD-10-CM

## 2018-01-01 DIAGNOSIS — I50.32 CHRONIC DIASTOLIC CONGESTIVE HEART FAILURE (H): ICD-10-CM

## 2018-01-01 DIAGNOSIS — M54.50 ACUTE RIGHT-SIDED LOW BACK PAIN WITHOUT SCIATICA: Primary | ICD-10-CM

## 2018-01-01 DIAGNOSIS — D70.1 CHEMOTHERAPY-INDUCED NEUTROPENIA (H): Primary | ICD-10-CM

## 2018-01-01 DIAGNOSIS — J18.9 PNEUMONIA OF BOTH LUNGS DUE TO INFECTIOUS ORGANISM, UNSPECIFIED PART OF LUNG: ICD-10-CM

## 2018-01-01 DIAGNOSIS — N30.00 ACUTE CYSTITIS WITHOUT HEMATURIA: ICD-10-CM

## 2018-01-01 DIAGNOSIS — D70.1 CHEMOTHERAPY-INDUCED NEUTROPENIA (H): ICD-10-CM

## 2018-01-01 DIAGNOSIS — Z51.11 ENCOUNTER FOR ANTINEOPLASTIC CHEMOTHERAPY: ICD-10-CM

## 2018-01-01 DIAGNOSIS — I48.0 PAROXYSMAL ATRIAL FIBRILLATION WITH RVR (H): Primary | ICD-10-CM

## 2018-01-01 LAB
ABO + RH BLD: NORMAL
ALBUMIN SERPL ELPH-MCNC: 3.3 G/DL (ref 3.7–5.1)
ALBUMIN SERPL ELPH-MCNC: 3.4 G/DL (ref 3.7–5.1)
ALBUMIN SERPL ELPH-MCNC: 3.5 G/DL (ref 3.7–5.1)
ALBUMIN SERPL ELPH-MCNC: 3.6 G/DL (ref 3.7–5.1)
ALBUMIN SERPL ELPH-MCNC: 3.7 G/DL (ref 3.7–5.1)
ALBUMIN SERPL-MCNC: 2 G/DL (ref 3.4–5)
ALBUMIN SERPL-MCNC: 2.2 G/DL (ref 3.4–5)
ALBUMIN SERPL-MCNC: 2.6 G/DL (ref 3.4–5)
ALBUMIN SERPL-MCNC: 2.6 G/DL (ref 3.4–5)
ALBUMIN SERPL-MCNC: 2.7 G/DL (ref 3.4–5)
ALBUMIN SERPL-MCNC: 3.1 G/DL (ref 3.4–5)
ALBUMIN UR-MCNC: 10 MG/DL
ALBUMIN UR-MCNC: 100 MG/DL
ALBUMIN UR-MCNC: 100 MG/DL
ALBUMIN UR-MCNC: 30 MG/DL
ALBUMIN UR-MCNC: ABNORMAL MG/DL
ALBUMIN UR-MCNC: NEGATIVE MG/DL
ALP SERPL-CCNC: 100 U/L (ref 40–150)
ALP SERPL-CCNC: 62 U/L (ref 40–150)
ALP SERPL-CCNC: 74 U/L (ref 40–150)
ALP SERPL-CCNC: 76 U/L (ref 40–150)
ALP SERPL-CCNC: 77 U/L (ref 40–150)
ALP SERPL-CCNC: 80 U/L (ref 40–150)
ALP SERPL-CCNC: 82 U/L (ref 40–150)
ALP SERPL-CCNC: 85 U/L (ref 40–150)
ALP SERPL-CCNC: 86 U/L (ref 40–150)
ALP SERPL-CCNC: 86 U/L (ref 40–150)
ALP SERPL-CCNC: 99 U/L (ref 40–150)
ALP SERPL-CCNC: 99 U/L (ref 40–150)
ALPHA1 GLOB SERPL ELPH-MCNC: 0.4 G/DL (ref 0.2–0.4)
ALPHA2 GLOB SERPL ELPH-MCNC: 0.8 G/DL (ref 0.5–0.9)
ALPHA2 GLOB SERPL ELPH-MCNC: 0.8 G/DL (ref 0.5–0.9)
ALPHA2 GLOB SERPL ELPH-MCNC: 0.9 G/DL (ref 0.5–0.9)
ALT SERPL W P-5'-P-CCNC: 11 U/L (ref 0–70)
ALT SERPL W P-5'-P-CCNC: 12 U/L (ref 0–70)
ALT SERPL W P-5'-P-CCNC: 13 U/L (ref 0–70)
ALT SERPL W P-5'-P-CCNC: 14 U/L (ref 0–70)
ALT SERPL W P-5'-P-CCNC: 15 U/L (ref 0–70)
ALT SERPL W P-5'-P-CCNC: 19 U/L (ref 0–70)
ANION GAP SERPL CALCULATED.3IONS-SCNC: 10 MMOL/L (ref 3–14)
ANION GAP SERPL CALCULATED.3IONS-SCNC: 6 MMOL/L (ref 3–14)
ANION GAP SERPL CALCULATED.3IONS-SCNC: 7 MMOL/L (ref 3–14)
ANION GAP SERPL CALCULATED.3IONS-SCNC: 7 MMOL/L (ref 3–14)
ANION GAP SERPL CALCULATED.3IONS-SCNC: 8 MMOL/L (ref 3–14)
ANION GAP SERPL CALCULATED.3IONS-SCNC: 9 MMOL/L (ref 3–14)
ANISOCYTOSIS BLD QL SMEAR: ABNORMAL
ANISOCYTOSIS BLD QL SMEAR: ABNORMAL
ANISOCYTOSIS BLD QL SMEAR: SLIGHT
APPEARANCE UR: ABNORMAL
APPEARANCE UR: CLEAR
APTT PPP: 25 SEC (ref 22–37)
AST SERPL W P-5'-P-CCNC: 11 U/L (ref 0–45)
AST SERPL W P-5'-P-CCNC: 12 U/L (ref 0–45)
AST SERPL W P-5'-P-CCNC: 13 U/L (ref 0–45)
AST SERPL W P-5'-P-CCNC: 14 U/L (ref 0–45)
AST SERPL W P-5'-P-CCNC: 15 U/L (ref 0–45)
AST SERPL W P-5'-P-CCNC: 15 U/L (ref 0–45)
AST SERPL W P-5'-P-CCNC: 16 U/L (ref 0–45)
AST SERPL W P-5'-P-CCNC: 16 U/L (ref 0–45)
AST SERPL W P-5'-P-CCNC: 18 U/L (ref 0–45)
AST SERPL W P-5'-P-CCNC: 21 U/L (ref 0–45)
AST SERPL W P-5'-P-CCNC: 7 U/L (ref 0–45)
B-GLOBULIN SERPL ELPH-MCNC: 0.6 G/DL (ref 0.6–1)
B-GLOBULIN SERPL ELPH-MCNC: 0.7 G/DL (ref 0.6–1)
B-GLOBULIN SERPL ELPH-MCNC: 0.8 G/DL (ref 0.6–1)
BACTERIA #/AREA URNS HPF: ABNORMAL /HPF
BACTERIA SPEC CULT: ABNORMAL
BACTERIA SPEC CULT: NO GROWTH
BASOPHILS # BLD AUTO: 0 10E9/L (ref 0–0.2)
BASOPHILS NFR BLD AUTO: 0 %
BASOPHILS NFR BLD AUTO: 0.2 %
BASOPHILS NFR BLD AUTO: 0.3 %
BASOPHILS NFR BLD AUTO: 1 %
BILIRUB DIRECT SERPL-MCNC: 0.1 MG/DL (ref 0–0.2)
BILIRUB DIRECT SERPL-MCNC: 0.1 MG/DL (ref 0–0.2)
BILIRUB SERPL-MCNC: 0.2 MG/DL (ref 0.2–1.3)
BILIRUB SERPL-MCNC: 0.2 MG/DL (ref 0.2–1.3)
BILIRUB SERPL-MCNC: 0.3 MG/DL (ref 0.2–1.3)
BILIRUB SERPL-MCNC: 0.3 MG/DL (ref 0.2–1.3)
BILIRUB SERPL-MCNC: 0.4 MG/DL (ref 0.2–1.3)
BILIRUB SERPL-MCNC: 0.5 MG/DL (ref 0.2–1.3)
BILIRUB UR QL STRIP: NEGATIVE
BLD GP AB SCN SERPL QL: NORMAL
BLD PROD TYP BPU: NORMAL
BLD UNIT ID BPU: 0
BLOOD BANK CMNT PATIENT-IMP: NORMAL
BLOOD PRODUCT CODE: NORMAL
BPU ID: NORMAL
BUN SERPL-MCNC: 14 MG/DL (ref 7–30)
BUN SERPL-MCNC: 18 MG/DL (ref 7–30)
BUN SERPL-MCNC: 18 MG/DL (ref 7–30)
BUN SERPL-MCNC: 21 MG/DL (ref 7–30)
BUN SERPL-MCNC: 22 MG/DL (ref 7–30)
BUN SERPL-MCNC: 24 MG/DL (ref 7–30)
BUN SERPL-MCNC: 25 MG/DL (ref 7–30)
BUN SERPL-MCNC: 27 MG/DL (ref 7–30)
BUN SERPL-MCNC: 27 MG/DL (ref 7–30)
BUN SERPL-MCNC: 28 MG/DL (ref 7–30)
BUN SERPL-MCNC: 28 MG/DL (ref 7–30)
BUN SERPL-MCNC: 29 MG/DL (ref 7–30)
BUN SERPL-MCNC: 30 MG/DL (ref 7–30)
BUN SERPL-MCNC: 31 MG/DL (ref 7–30)
BUN SERPL-MCNC: 32 MG/DL (ref 7–30)
BUN SERPL-MCNC: 32 MG/DL (ref 7–30)
BUN SERPL-MCNC: 33 MG/DL (ref 7–30)
BUN SERPL-MCNC: 36 MG/DL (ref 7–30)
BUN SERPL-MCNC: 37 MG/DL (ref 7–30)
BUN SERPL-MCNC: 38 MG/DL (ref 7–30)
BUN SERPL-MCNC: 39 MG/DL (ref 7–30)
BUN SERPL-MCNC: 45 MG/DL (ref 7–30)
C DIFF TOX B STL QL: NEGATIVE
CALCIUM SERPL-MCNC: 7.7 MG/DL (ref 8.5–10.1)
CALCIUM SERPL-MCNC: 8 MG/DL (ref 8.5–10.1)
CALCIUM SERPL-MCNC: 8 MG/DL (ref 8.5–10.1)
CALCIUM SERPL-MCNC: 8.2 MG/DL (ref 8.5–10.1)
CALCIUM SERPL-MCNC: 8.3 MG/DL (ref 8.5–10.1)
CALCIUM SERPL-MCNC: 8.5 MG/DL (ref 8.5–10.1)
CALCIUM SERPL-MCNC: 8.6 MG/DL (ref 8.5–10.1)
CALCIUM SERPL-MCNC: 8.7 MG/DL (ref 8.5–10.1)
CALCIUM SERPL-MCNC: 8.8 MG/DL (ref 8.5–10.1)
CALCIUM SERPL-MCNC: 8.8 MG/DL (ref 8.5–10.1)
CALCIUM SERPL-MCNC: 8.9 MG/DL (ref 8.5–10.1)
CALCIUM SERPL-MCNC: 8.9 MG/DL (ref 8.5–10.1)
CALCIUM SERPL-MCNC: 9 MG/DL (ref 8.5–10.1)
CALCIUM SERPL-MCNC: 9.1 MG/DL (ref 8.5–10.1)
CALCIUM SERPL-MCNC: 9.2 MG/DL (ref 8.5–10.1)
CALCIUM SERPL-MCNC: 9.3 MG/DL (ref 8.5–10.1)
CALCIUM SERPL-MCNC: 9.3 MG/DL (ref 8.5–10.1)
CALCIUM SERPL-MCNC: 9.4 MG/DL (ref 8.5–10.1)
CALCIUM SERPL-MCNC: 9.5 MG/DL (ref 8.5–10.1)
CHLORIDE SERPL-SCNC: 101 MMOL/L (ref 94–109)
CHLORIDE SERPL-SCNC: 101 MMOL/L (ref 94–109)
CHLORIDE SERPL-SCNC: 102 MMOL/L (ref 94–109)
CHLORIDE SERPL-SCNC: 103 MMOL/L (ref 94–109)
CHLORIDE SERPL-SCNC: 103 MMOL/L (ref 94–109)
CHLORIDE SERPL-SCNC: 104 MMOL/L (ref 94–109)
CHLORIDE SERPL-SCNC: 105 MMOL/L (ref 94–109)
CHLORIDE SERPL-SCNC: 105 MMOL/L (ref 94–109)
CHLORIDE SERPL-SCNC: 106 MMOL/L (ref 94–109)
CHLORIDE SERPL-SCNC: 107 MMOL/L (ref 94–109)
CHLORIDE SERPL-SCNC: 108 MMOL/L (ref 94–109)
CHLORIDE SERPL-SCNC: 109 MMOL/L (ref 94–109)
CHLORIDE SERPL-SCNC: 110 MMOL/L (ref 94–109)
CHLORIDE SERPL-SCNC: 113 MMOL/L (ref 94–109)
CHLORIDE SERPL-SCNC: 113 MMOL/L (ref 94–109)
CHLORIDE SERPL-SCNC: 115 MMOL/L (ref 94–109)
CHLORIDE SERPL-SCNC: 116 MMOL/L (ref 94–109)
CO2 SERPL-SCNC: 17 MMOL/L (ref 20–32)
CO2 SERPL-SCNC: 18 MMOL/L (ref 20–32)
CO2 SERPL-SCNC: 18 MMOL/L (ref 20–32)
CO2 SERPL-SCNC: 19 MMOL/L (ref 20–32)
CO2 SERPL-SCNC: 20 MMOL/L (ref 20–32)
CO2 SERPL-SCNC: 20 MMOL/L (ref 20–32)
CO2 SERPL-SCNC: 21 MMOL/L (ref 20–32)
CO2 SERPL-SCNC: 21 MMOL/L (ref 20–32)
CO2 SERPL-SCNC: 22 MMOL/L (ref 20–32)
CO2 SERPL-SCNC: 23 MMOL/L (ref 20–32)
CO2 SERPL-SCNC: 24 MMOL/L (ref 20–32)
CO2 SERPL-SCNC: 25 MMOL/L (ref 20–32)
CO2 SERPL-SCNC: 26 MMOL/L (ref 20–32)
COLOR UR AUTO: ABNORMAL
COLOR UR AUTO: ABNORMAL
COLOR UR AUTO: YELLOW
COPATH REPORT: NORMAL
CREAT SERPL-MCNC: 0.9 MG/DL (ref 0.66–1.25)
CREAT SERPL-MCNC: 0.91 MG/DL (ref 0.66–1.25)
CREAT SERPL-MCNC: 0.93 MG/DL (ref 0.66–1.25)
CREAT SERPL-MCNC: 0.94 MG/DL (ref 0.66–1.25)
CREAT SERPL-MCNC: 0.94 MG/DL (ref 0.66–1.25)
CREAT SERPL-MCNC: 0.95 MG/DL (ref 0.66–1.25)
CREAT SERPL-MCNC: 0.96 MG/DL (ref 0.66–1.25)
CREAT SERPL-MCNC: 0.97 MG/DL (ref 0.66–1.25)
CREAT SERPL-MCNC: 0.99 MG/DL (ref 0.66–1.25)
CREAT SERPL-MCNC: 1.01 MG/DL (ref 0.66–1.25)
CREAT SERPL-MCNC: 1.02 MG/DL (ref 0.66–1.25)
CREAT SERPL-MCNC: 1.06 MG/DL (ref 0.66–1.25)
CREAT SERPL-MCNC: 1.08 MG/DL (ref 0.66–1.25)
CREAT SERPL-MCNC: 1.08 MG/DL (ref 0.66–1.25)
CREAT SERPL-MCNC: 1.09 MG/DL (ref 0.66–1.25)
CREAT SERPL-MCNC: 1.1 MG/DL (ref 0.66–1.25)
CREAT SERPL-MCNC: 1.12 MG/DL (ref 0.66–1.25)
CREAT SERPL-MCNC: 1.14 MG/DL (ref 0.66–1.25)
CREAT SERPL-MCNC: 1.14 MG/DL (ref 0.66–1.25)
CREAT SERPL-MCNC: 1.16 MG/DL (ref 0.66–1.25)
CREAT SERPL-MCNC: 1.18 MG/DL (ref 0.66–1.25)
CREAT SERPL-MCNC: 1.18 MG/DL (ref 0.66–1.25)
CREAT SERPL-MCNC: 1.2 MG/DL (ref 0.66–1.25)
CREAT SERPL-MCNC: 1.24 MG/DL (ref 0.66–1.25)
CREAT SERPL-MCNC: 1.24 MG/DL (ref 0.66–1.25)
CREAT SERPL-MCNC: 1.26 MG/DL (ref 0.66–1.25)
CREAT SERPL-MCNC: 1.38 MG/DL (ref 0.66–1.25)
CREAT SERPL-MCNC: 1.45 MG/DL (ref 0.66–1.25)
CREAT SERPL-MCNC: 1.46 MG/DL (ref 0.66–1.25)
CREAT SERPL-MCNC: 1.48 MG/DL (ref 0.66–1.25)
CREAT UR-MCNC: 24 MG/DL
D DIMER PPP FEU-MCNC: 0.5 UG/ML FEU (ref 0–0.5)
DIFFERENTIAL METHOD BLD: ABNORMAL
EOSINOPHIL # BLD AUTO: 0 10E9/L (ref 0–0.7)
EOSINOPHIL # BLD AUTO: 0.1 10E9/L (ref 0–0.7)
EOSINOPHIL NFR BLD AUTO: 0 %
EOSINOPHIL NFR BLD AUTO: 0.2 %
EOSINOPHIL NFR BLD AUTO: 0.4 %
EOSINOPHIL NFR BLD AUTO: 0.5 %
EOSINOPHIL NFR BLD AUTO: 0.6 %
EOSINOPHIL NFR BLD AUTO: 0.7 %
EOSINOPHIL NFR BLD AUTO: 0.8 %
EOSINOPHIL NFR BLD AUTO: 1 %
EOSINOPHIL NFR BLD AUTO: 1.1 %
EOSINOPHIL NFR BLD AUTO: 1.2 %
EOSINOPHIL NFR BLD AUTO: 1.4 %
EOSINOPHIL NFR BLD AUTO: 1.5 %
ERYTHROCYTE [DISTWIDTH] IN BLOOD BY AUTOMATED COUNT: 17.4 % (ref 10–15)
ERYTHROCYTE [DISTWIDTH] IN BLOOD BY AUTOMATED COUNT: 17.5 % (ref 10–15)
ERYTHROCYTE [DISTWIDTH] IN BLOOD BY AUTOMATED COUNT: 17.6 % (ref 10–15)
ERYTHROCYTE [DISTWIDTH] IN BLOOD BY AUTOMATED COUNT: 17.8 % (ref 10–15)
ERYTHROCYTE [DISTWIDTH] IN BLOOD BY AUTOMATED COUNT: 18.1 % (ref 10–15)
ERYTHROCYTE [DISTWIDTH] IN BLOOD BY AUTOMATED COUNT: 18.1 % (ref 10–15)
ERYTHROCYTE [DISTWIDTH] IN BLOOD BY AUTOMATED COUNT: 18.4 % (ref 10–15)
ERYTHROCYTE [DISTWIDTH] IN BLOOD BY AUTOMATED COUNT: 18.6 % (ref 10–15)
ERYTHROCYTE [DISTWIDTH] IN BLOOD BY AUTOMATED COUNT: 18.7 % (ref 10–15)
ERYTHROCYTE [DISTWIDTH] IN BLOOD BY AUTOMATED COUNT: 18.8 % (ref 10–15)
ERYTHROCYTE [DISTWIDTH] IN BLOOD BY AUTOMATED COUNT: 19 % (ref 10–15)
ERYTHROCYTE [DISTWIDTH] IN BLOOD BY AUTOMATED COUNT: 19.1 % (ref 10–15)
ERYTHROCYTE [DISTWIDTH] IN BLOOD BY AUTOMATED COUNT: 19.4 % (ref 10–15)
ERYTHROCYTE [DISTWIDTH] IN BLOOD BY AUTOMATED COUNT: 19.5 % (ref 10–15)
ERYTHROCYTE [DISTWIDTH] IN BLOOD BY AUTOMATED COUNT: 19.6 % (ref 10–15)
ERYTHROCYTE [DISTWIDTH] IN BLOOD BY AUTOMATED COUNT: 19.6 % (ref 10–15)
ERYTHROCYTE [DISTWIDTH] IN BLOOD BY AUTOMATED COUNT: 19.7 % (ref 10–15)
ERYTHROCYTE [DISTWIDTH] IN BLOOD BY AUTOMATED COUNT: 19.8 % (ref 10–15)
ERYTHROCYTE [DISTWIDTH] IN BLOOD BY AUTOMATED COUNT: 19.9 % (ref 10–15)
ERYTHROCYTE [DISTWIDTH] IN BLOOD BY AUTOMATED COUNT: 20.1 % (ref 10–15)
ERYTHROCYTE [DISTWIDTH] IN BLOOD BY AUTOMATED COUNT: 20.1 % (ref 10–15)
ERYTHROCYTE [DISTWIDTH] IN BLOOD BY AUTOMATED COUNT: 20.2 % (ref 10–15)
ERYTHROCYTE [DISTWIDTH] IN BLOOD BY AUTOMATED COUNT: 20.3 % (ref 10–15)
ERYTHROCYTE [DISTWIDTH] IN BLOOD BY AUTOMATED COUNT: 20.3 % (ref 10–15)
ERYTHROCYTE [DISTWIDTH] IN BLOOD BY AUTOMATED COUNT: 21.6 % (ref 10–15)
ERYTHROCYTE [DISTWIDTH] IN BLOOD BY AUTOMATED COUNT: 21.6 % (ref 10–15)
ERYTHROCYTE [DISTWIDTH] IN BLOOD BY AUTOMATED COUNT: 21.9 % (ref 10–15)
ERYTHROCYTE [DISTWIDTH] IN BLOOD BY AUTOMATED COUNT: 22 % (ref 10–15)
ERYTHROCYTE [DISTWIDTH] IN BLOOD BY AUTOMATED COUNT: 22.1 % (ref 10–15)
ERYTHROCYTE [DISTWIDTH] IN BLOOD BY AUTOMATED COUNT: NORMAL % (ref 10–15)
GAMMA GLOB SERPL ELPH-MCNC: 2 G/DL (ref 0.7–1.6)
GAMMA GLOB SERPL ELPH-MCNC: 2 G/DL (ref 0.7–1.6)
GAMMA GLOB SERPL ELPH-MCNC: 2.3 G/DL (ref 0.7–1.6)
GAMMA GLOB SERPL ELPH-MCNC: 2.4 G/DL (ref 0.7–1.6)
GAMMA GLOB SERPL ELPH-MCNC: 3.6 G/DL (ref 0.7–1.6)
GFR SERPL CREATININE-BSD FRML MDRD: 44 ML/MIN/1.7M2
GFR SERPL CREATININE-BSD FRML MDRD: 45 ML/MIN/1.7M2
GFR SERPL CREATININE-BSD FRML MDRD: 46 ML/MIN/1.7M2
GFR SERPL CREATININE-BSD FRML MDRD: 48 ML/MIN/1.7M2
GFR SERPL CREATININE-BSD FRML MDRD: 54 ML/MIN/1.7M2
GFR SERPL CREATININE-BSD FRML MDRD: 55 ML/MIN/1.7M2
GFR SERPL CREATININE-BSD FRML MDRD: 55 ML/MIN/1.7M2
GFR SERPL CREATININE-BSD FRML MDRD: 57 ML/MIN/1.7M2
GFR SERPL CREATININE-BSD FRML MDRD: 58 ML/MIN/1.7M2
GFR SERPL CREATININE-BSD FRML MDRD: 58 ML/MIN/1.7M2
GFR SERPL CREATININE-BSD FRML MDRD: 59 ML/MIN/1.7M2
GFR SERPL CREATININE-BSD FRML MDRD: 60 ML/MIN/1.7M2
GFR SERPL CREATININE-BSD FRML MDRD: 60 ML/MIN/1.7M2
GFR SERPL CREATININE-BSD FRML MDRD: 61 ML/MIN/1.7M2
GFR SERPL CREATININE-BSD FRML MDRD: 63 ML/MIN/1.7M2
GFR SERPL CREATININE-BSD FRML MDRD: 63 ML/MIN/1.7M2
GFR SERPL CREATININE-BSD FRML MDRD: 64 ML/MIN/1.7M2
GFR SERPL CREATININE-BSD FRML MDRD: 64 ML/MIN/1.7M2
GFR SERPL CREATININE-BSD FRML MDRD: 65 ML/MIN/1.7M2
GFR SERPL CREATININE-BSD FRML MDRD: 68 ML/MIN/1.7M2
GFR SERPL CREATININE-BSD FRML MDRD: 69 ML/MIN/1.7M2
GFR SERPL CREATININE-BSD FRML MDRD: 70 ML/MIN/1.7M2
GFR SERPL CREATININE-BSD FRML MDRD: 72 ML/MIN/1.7M2
GFR SERPL CREATININE-BSD FRML MDRD: 74 ML/MIN/1.7M2
GFR SERPL CREATININE-BSD FRML MDRD: 74 ML/MIN/1.7M2
GFR SERPL CREATININE-BSD FRML MDRD: 75 ML/MIN/1.7M2
GFR SERPL CREATININE-BSD FRML MDRD: 75 ML/MIN/1.7M2
GFR SERPL CREATININE-BSD FRML MDRD: 76 ML/MIN/1.7M2
GFR SERPL CREATININE-BSD FRML MDRD: 78 ML/MIN/1.7M2
GFR SERPL CREATININE-BSD FRML MDRD: 79 ML/MIN/1.7M2
GLUCOSE BLDC GLUCOMTR-MCNC: 100 MG/DL (ref 70–99)
GLUCOSE BLDC GLUCOMTR-MCNC: 101 MG/DL (ref 70–99)
GLUCOSE BLDC GLUCOMTR-MCNC: 102 MG/DL (ref 70–99)
GLUCOSE BLDC GLUCOMTR-MCNC: 103 MG/DL (ref 70–99)
GLUCOSE BLDC GLUCOMTR-MCNC: 105 MG/DL (ref 70–99)
GLUCOSE BLDC GLUCOMTR-MCNC: 105 MG/DL (ref 70–99)
GLUCOSE BLDC GLUCOMTR-MCNC: 106 MG/DL (ref 70–99)
GLUCOSE BLDC GLUCOMTR-MCNC: 106 MG/DL (ref 70–99)
GLUCOSE BLDC GLUCOMTR-MCNC: 107 MG/DL (ref 70–99)
GLUCOSE BLDC GLUCOMTR-MCNC: 109 MG/DL (ref 70–99)
GLUCOSE BLDC GLUCOMTR-MCNC: 110 MG/DL (ref 70–99)
GLUCOSE BLDC GLUCOMTR-MCNC: 113 MG/DL (ref 70–99)
GLUCOSE BLDC GLUCOMTR-MCNC: 113 MG/DL (ref 70–99)
GLUCOSE BLDC GLUCOMTR-MCNC: 114 MG/DL (ref 70–99)
GLUCOSE BLDC GLUCOMTR-MCNC: 116 MG/DL (ref 70–99)
GLUCOSE BLDC GLUCOMTR-MCNC: 118 MG/DL (ref 70–99)
GLUCOSE BLDC GLUCOMTR-MCNC: 118 MG/DL (ref 70–99)
GLUCOSE BLDC GLUCOMTR-MCNC: 122 MG/DL (ref 70–99)
GLUCOSE BLDC GLUCOMTR-MCNC: 123 MG/DL (ref 70–99)
GLUCOSE BLDC GLUCOMTR-MCNC: 126 MG/DL (ref 70–99)
GLUCOSE BLDC GLUCOMTR-MCNC: 126 MG/DL (ref 70–99)
GLUCOSE BLDC GLUCOMTR-MCNC: 127 MG/DL (ref 70–99)
GLUCOSE BLDC GLUCOMTR-MCNC: 129 MG/DL (ref 70–99)
GLUCOSE BLDC GLUCOMTR-MCNC: 130 MG/DL (ref 70–99)
GLUCOSE BLDC GLUCOMTR-MCNC: 131 MG/DL (ref 70–99)
GLUCOSE BLDC GLUCOMTR-MCNC: 132 MG/DL (ref 70–99)
GLUCOSE BLDC GLUCOMTR-MCNC: 133 MG/DL (ref 70–99)
GLUCOSE BLDC GLUCOMTR-MCNC: 138 MG/DL (ref 70–99)
GLUCOSE BLDC GLUCOMTR-MCNC: 140 MG/DL (ref 70–99)
GLUCOSE BLDC GLUCOMTR-MCNC: 141 MG/DL (ref 70–99)
GLUCOSE BLDC GLUCOMTR-MCNC: 142 MG/DL (ref 70–99)
GLUCOSE BLDC GLUCOMTR-MCNC: 142 MG/DL (ref 70–99)
GLUCOSE BLDC GLUCOMTR-MCNC: 145 MG/DL (ref 70–99)
GLUCOSE BLDC GLUCOMTR-MCNC: 146 MG/DL (ref 70–99)
GLUCOSE BLDC GLUCOMTR-MCNC: 153 MG/DL (ref 70–99)
GLUCOSE BLDC GLUCOMTR-MCNC: 155 MG/DL (ref 70–99)
GLUCOSE BLDC GLUCOMTR-MCNC: 157 MG/DL (ref 70–99)
GLUCOSE BLDC GLUCOMTR-MCNC: 161 MG/DL (ref 70–99)
GLUCOSE BLDC GLUCOMTR-MCNC: 161 MG/DL (ref 70–99)
GLUCOSE BLDC GLUCOMTR-MCNC: 162 MG/DL (ref 70–99)
GLUCOSE BLDC GLUCOMTR-MCNC: 164 MG/DL (ref 70–99)
GLUCOSE BLDC GLUCOMTR-MCNC: 170 MG/DL (ref 70–99)
GLUCOSE BLDC GLUCOMTR-MCNC: 173 MG/DL (ref 70–99)
GLUCOSE BLDC GLUCOMTR-MCNC: 180 MG/DL (ref 70–99)
GLUCOSE BLDC GLUCOMTR-MCNC: 185 MG/DL (ref 70–99)
GLUCOSE BLDC GLUCOMTR-MCNC: 186 MG/DL (ref 70–99)
GLUCOSE BLDC GLUCOMTR-MCNC: 191 MG/DL (ref 70–99)
GLUCOSE BLDC GLUCOMTR-MCNC: 192 MG/DL (ref 70–99)
GLUCOSE BLDC GLUCOMTR-MCNC: 193 MG/DL (ref 70–99)
GLUCOSE BLDC GLUCOMTR-MCNC: 200 MG/DL (ref 70–99)
GLUCOSE BLDC GLUCOMTR-MCNC: 211 MG/DL (ref 70–99)
GLUCOSE BLDC GLUCOMTR-MCNC: 221 MG/DL (ref 70–99)
GLUCOSE BLDC GLUCOMTR-MCNC: 234 MG/DL (ref 70–99)
GLUCOSE BLDC GLUCOMTR-MCNC: 246 MG/DL (ref 70–99)
GLUCOSE BLDC GLUCOMTR-MCNC: 264 MG/DL (ref 70–99)
GLUCOSE BLDC GLUCOMTR-MCNC: 274 MG/DL (ref 70–99)
GLUCOSE BLDC GLUCOMTR-MCNC: 275 MG/DL (ref 70–99)
GLUCOSE BLDC GLUCOMTR-MCNC: 281 MG/DL (ref 70–99)
GLUCOSE BLDC GLUCOMTR-MCNC: 285 MG/DL (ref 70–99)
GLUCOSE BLDC GLUCOMTR-MCNC: 286 MG/DL (ref 70–99)
GLUCOSE BLDC GLUCOMTR-MCNC: 320 MG/DL (ref 70–99)
GLUCOSE BLDC GLUCOMTR-MCNC: 386 MG/DL (ref 70–99)
GLUCOSE BLDC GLUCOMTR-MCNC: 393 MG/DL (ref 70–99)
GLUCOSE BLDC GLUCOMTR-MCNC: 437 MG/DL (ref 70–99)
GLUCOSE BLDC GLUCOMTR-MCNC: 437 MG/DL (ref 70–99)
GLUCOSE BLDC GLUCOMTR-MCNC: 58 MG/DL (ref 70–99)
GLUCOSE BLDC GLUCOMTR-MCNC: 61 MG/DL (ref 70–99)
GLUCOSE BLDC GLUCOMTR-MCNC: 61 MG/DL (ref 70–99)
GLUCOSE BLDC GLUCOMTR-MCNC: 67 MG/DL (ref 70–99)
GLUCOSE BLDC GLUCOMTR-MCNC: 71 MG/DL (ref 70–99)
GLUCOSE BLDC GLUCOMTR-MCNC: 72 MG/DL (ref 70–99)
GLUCOSE BLDC GLUCOMTR-MCNC: 74 MG/DL (ref 70–99)
GLUCOSE BLDC GLUCOMTR-MCNC: 76 MG/DL (ref 70–99)
GLUCOSE BLDC GLUCOMTR-MCNC: 77 MG/DL (ref 70–99)
GLUCOSE BLDC GLUCOMTR-MCNC: 78 MG/DL (ref 70–99)
GLUCOSE BLDC GLUCOMTR-MCNC: 79 MG/DL (ref 70–99)
GLUCOSE BLDC GLUCOMTR-MCNC: 84 MG/DL (ref 70–99)
GLUCOSE BLDC GLUCOMTR-MCNC: 85 MG/DL (ref 70–99)
GLUCOSE BLDC GLUCOMTR-MCNC: 86 MG/DL (ref 70–99)
GLUCOSE BLDC GLUCOMTR-MCNC: 87 MG/DL (ref 70–99)
GLUCOSE BLDC GLUCOMTR-MCNC: 91 MG/DL (ref 70–99)
GLUCOSE BLDC GLUCOMTR-MCNC: 92 MG/DL (ref 70–99)
GLUCOSE BLDC GLUCOMTR-MCNC: 99 MG/DL (ref 70–99)
GLUCOSE BLDC GLUCOMTR-MCNC: 99 MG/DL (ref 70–99)
GLUCOSE SERPL-MCNC: 103 MG/DL (ref 70–99)
GLUCOSE SERPL-MCNC: 104 MG/DL (ref 70–99)
GLUCOSE SERPL-MCNC: 106 MG/DL (ref 70–99)
GLUCOSE SERPL-MCNC: 110 MG/DL (ref 70–99)
GLUCOSE SERPL-MCNC: 120 MG/DL (ref 70–99)
GLUCOSE SERPL-MCNC: 134 MG/DL (ref 70–99)
GLUCOSE SERPL-MCNC: 137 MG/DL (ref 70–99)
GLUCOSE SERPL-MCNC: 142 MG/DL (ref 70–99)
GLUCOSE SERPL-MCNC: 168 MG/DL (ref 70–99)
GLUCOSE SERPL-MCNC: 174 MG/DL (ref 70–99)
GLUCOSE SERPL-MCNC: 176 MG/DL (ref 70–99)
GLUCOSE SERPL-MCNC: 176 MG/DL (ref 70–99)
GLUCOSE SERPL-MCNC: 183 MG/DL (ref 70–99)
GLUCOSE SERPL-MCNC: 190 MG/DL (ref 70–99)
GLUCOSE SERPL-MCNC: 195 MG/DL (ref 70–99)
GLUCOSE SERPL-MCNC: 198 MG/DL (ref 70–99)
GLUCOSE SERPL-MCNC: 205 MG/DL (ref 70–99)
GLUCOSE SERPL-MCNC: 211 MG/DL (ref 70–99)
GLUCOSE SERPL-MCNC: 241 MG/DL (ref 70–99)
GLUCOSE SERPL-MCNC: 274 MG/DL (ref 70–99)
GLUCOSE SERPL-MCNC: 446 MG/DL (ref 70–99)
GLUCOSE SERPL-MCNC: 74 MG/DL (ref 70–99)
GLUCOSE SERPL-MCNC: 75 MG/DL (ref 70–99)
GLUCOSE SERPL-MCNC: 76 MG/DL (ref 70–99)
GLUCOSE SERPL-MCNC: 83 MG/DL (ref 70–99)
GLUCOSE SERPL-MCNC: 84 MG/DL (ref 70–99)
GLUCOSE SERPL-MCNC: 98 MG/DL (ref 70–99)
GLUCOSE UR STRIP-MCNC: 30 MG/DL
GLUCOSE UR STRIP-MCNC: NEGATIVE MG/DL
HBA1C MFR BLD: 5.5 % (ref 4.3–6)
HBA1C MFR BLD: 6.3 % (ref 4.3–6)
HCT VFR BLD AUTO: 23.9 % (ref 40–53)
HCT VFR BLD AUTO: 24 % (ref 40–53)
HCT VFR BLD AUTO: 24.2 % (ref 40–53)
HCT VFR BLD AUTO: 24.4 % (ref 40–53)
HCT VFR BLD AUTO: 24.6 % (ref 40–53)
HCT VFR BLD AUTO: 24.7 % (ref 40–53)
HCT VFR BLD AUTO: 24.8 % (ref 40–53)
HCT VFR BLD AUTO: 24.9 % (ref 40–53)
HCT VFR BLD AUTO: 24.9 % (ref 40–53)
HCT VFR BLD AUTO: 25 % (ref 40–53)
HCT VFR BLD AUTO: 25 % (ref 40–53)
HCT VFR BLD AUTO: 25.1 % (ref 40–53)
HCT VFR BLD AUTO: 25.3 % (ref 40–53)
HCT VFR BLD AUTO: 25.6 % (ref 40–53)
HCT VFR BLD AUTO: 25.7 % (ref 40–53)
HCT VFR BLD AUTO: 25.8 % (ref 40–53)
HCT VFR BLD AUTO: 26.1 % (ref 40–53)
HCT VFR BLD AUTO: 26.2 % (ref 40–53)
HCT VFR BLD AUTO: 26.3 % (ref 40–53)
HCT VFR BLD AUTO: 26.3 % (ref 40–53)
HCT VFR BLD AUTO: 26.4 % (ref 40–53)
HCT VFR BLD AUTO: 26.6 % (ref 40–53)
HCT VFR BLD AUTO: 26.9 % (ref 40–53)
HCT VFR BLD AUTO: 27.3 % (ref 40–53)
HCT VFR BLD AUTO: 27.3 % (ref 40–53)
HCT VFR BLD AUTO: 27.5 % (ref 40–53)
HCT VFR BLD AUTO: 27.6 % (ref 40–53)
HCT VFR BLD AUTO: 28 % (ref 40–53)
HCT VFR BLD AUTO: 28.1 % (ref 40–53)
HCT VFR BLD AUTO: 28.2 % (ref 40–53)
HCT VFR BLD AUTO: 28.5 % (ref 40–53)
HCT VFR BLD AUTO: 28.6 % (ref 40–53)
HCT VFR BLD AUTO: 28.7 % (ref 40–53)
HCT VFR BLD AUTO: 29.7 % (ref 40–53)
HCT VFR BLD AUTO: 29.9 % (ref 40–53)
HCT VFR BLD AUTO: 30 % (ref 40–53)
HCT VFR BLD AUTO: 30.8 % (ref 40–53)
HCT VFR BLD AUTO: 32.1 % (ref 40–53)
HCT VFR BLD AUTO: NORMAL % (ref 40–53)
HGB BLD-MCNC: 10 G/DL (ref 13.3–17.7)
HGB BLD-MCNC: 10.4 G/DL (ref 13.3–17.7)
HGB BLD-MCNC: 7.6 G/DL (ref 13.3–17.7)
HGB BLD-MCNC: 7.7 G/DL (ref 13.3–17.7)
HGB BLD-MCNC: 7.7 G/DL (ref 13.3–17.7)
HGB BLD-MCNC: 7.9 G/DL (ref 13.3–17.7)
HGB BLD-MCNC: 8 G/DL (ref 13.3–17.7)
HGB BLD-MCNC: 8 G/DL (ref 13.3–17.7)
HGB BLD-MCNC: 8.1 G/DL (ref 13.3–17.7)
HGB BLD-MCNC: 8.2 G/DL (ref 13.3–17.7)
HGB BLD-MCNC: 8.3 G/DL (ref 13.3–17.7)
HGB BLD-MCNC: 8.3 G/DL (ref 13.3–17.7)
HGB BLD-MCNC: 8.4 G/DL (ref 13.3–17.7)
HGB BLD-MCNC: 8.5 G/DL (ref 13.3–17.7)
HGB BLD-MCNC: 8.6 G/DL (ref 13.3–17.7)
HGB BLD-MCNC: 8.7 G/DL (ref 13.3–17.7)
HGB BLD-MCNC: 8.7 G/DL (ref 13.3–17.7)
HGB BLD-MCNC: 8.8 G/DL (ref 13.3–17.7)
HGB BLD-MCNC: 8.9 G/DL (ref 13.3–17.7)
HGB BLD-MCNC: 8.9 G/DL (ref 13.3–17.7)
HGB BLD-MCNC: 9 G/DL (ref 13.3–17.7)
HGB BLD-MCNC: 9.1 G/DL (ref 13.3–17.7)
HGB BLD-MCNC: 9.2 G/DL (ref 13.3–17.7)
HGB BLD-MCNC: 9.2 G/DL (ref 13.3–17.7)
HGB BLD-MCNC: 9.3 G/DL (ref 13.3–17.7)
HGB BLD-MCNC: 9.3 G/DL (ref 13.3–17.7)
HGB BLD-MCNC: 9.6 G/DL (ref 13.3–17.7)
HGB BLD-MCNC: 9.6 G/DL (ref 13.3–17.7)
HGB BLD-MCNC: 9.8 G/DL (ref 13.3–17.7)
HGB BLD-MCNC: NORMAL G/DL (ref 13.3–17.7)
HGB UR QL STRIP: ABNORMAL
HGB UR QL STRIP: NEGATIVE
HYALINE CASTS #/AREA URNS LPF: 1 /LPF (ref 0–2)
HYALINE CASTS #/AREA URNS LPF: 8 /LPF (ref 0–2)
IGA SERPL-MCNC: 12 MG/DL (ref 70–380)
IGA SERPL-MCNC: 12 MG/DL (ref 70–380)
IGA SERPL-MCNC: 13 MG/DL (ref 70–380)
IGA SERPL-MCNC: 19 MG/DL (ref 70–380)
IGA SERPL-MCNC: 8 MG/DL (ref 70–380)
IGG SERPL-MCNC: 238 MG/DL (ref 695–1620)
IGG SERPL-MCNC: 241 MG/DL (ref 695–1620)
IGG SERPL-MCNC: 252 MG/DL (ref 695–1620)
IGG SERPL-MCNC: 258 MG/DL (ref 695–1620)
IGG SERPL-MCNC: 261 MG/DL (ref 695–1620)
IGM SERPL-MCNC: 2510 MG/DL (ref 60–265)
IGM SERPL-MCNC: 2952 MG/DL (ref 60–265)
IGM SERPL-MCNC: 3420 MG/DL (ref 60–265)
IGM SERPL-MCNC: 3470 MG/DL (ref 60–265)
IGM SERPL-MCNC: 5850 MG/DL (ref 60–265)
IMM GRANULOCYTES # BLD: 0 10E9/L (ref 0–0.4)
IMM GRANULOCYTES # BLD: 0.1 10E9/L (ref 0–0.4)
IMM GRANULOCYTES # BLD: 0.2 10E9/L (ref 0–0.4)
IMM GRANULOCYTES # BLD: 0.3 10E9/L (ref 0–0.4)
IMM GRANULOCYTES NFR BLD: 0.2 %
IMM GRANULOCYTES NFR BLD: 0.3 %
IMM GRANULOCYTES NFR BLD: 0.4 %
IMM GRANULOCYTES NFR BLD: 0.4 %
IMM GRANULOCYTES NFR BLD: 0.5 %
IMM GRANULOCYTES NFR BLD: 0.5 %
IMM GRANULOCYTES NFR BLD: 0.6 %
IMM GRANULOCYTES NFR BLD: 0.7 %
IMM GRANULOCYTES NFR BLD: 0.8 %
IMM GRANULOCYTES NFR BLD: 0.9 %
IMM GRANULOCYTES NFR BLD: 1 %
IMM GRANULOCYTES NFR BLD: 1.1 %
IMM GRANULOCYTES NFR BLD: 1.5 %
IMM GRANULOCYTES NFR BLD: 1.6 %
IMM GRANULOCYTES NFR BLD: 1.8 %
IMM GRANULOCYTES NFR BLD: 2 %
IMM GRANULOCYTES NFR BLD: 3.7 %
IMM GRANULOCYTES NFR BLD: 3.7 %
IMM GRANULOCYTES NFR BLD: 4 %
IMM GRANULOCYTES NFR BLD: 4.1 %
IMM GRANULOCYTES NFR BLD: 4.6 %
INR PPP: 1.09 (ref 0.86–1.14)
INR PPP: 1.13 (ref 0.86–1.14)
INTERPRETATION ECG - MUSE: NORMAL
KAPPA LC UR-MCNC: 110.25 MG/DL (ref 0.33–1.94)
KAPPA LC UR-MCNC: 265 MG/DL (ref 0.33–1.94)
KAPPA LC UR-MCNC: 72 MG/DL (ref 0.33–1.94)
KAPPA LC UR-MCNC: 80.25 MG/DL (ref 0.33–1.94)
KAPPA LC UR-MCNC: 81.5 MG/DL (ref 0.33–1.94)
KAPPA LC/LAMBDA SER: 123.46 {RATIO} (ref 0.26–1.65)
KAPPA LC/LAMBDA SER: 123.48 {RATIO} (ref 0.26–1.65)
KAPPA LC/LAMBDA SER: 125.28 {RATIO} (ref 0.26–1.65)
KAPPA LC/LAMBDA SER: 248.28 {RATIO} (ref 0.26–1.65)
KAPPA LC/LAMBDA SER: 913.79 {RATIO} (ref 0.26–1.65)
KETONES UR STRIP-MCNC: 5 MG/DL
KETONES UR STRIP-MCNC: NEGATIVE MG/DL
LACTATE BLD-SCNC: 1 MMOL/L (ref 0.4–1.9)
LACTATE BLD-SCNC: 1 MMOL/L (ref 0.7–2)
LACTATE BLD-SCNC: 1.5 MMOL/L (ref 0.7–2)
LACTATE BLD-SCNC: 1.6 MMOL/L (ref 0.7–2)
LACTATE BLD-SCNC: 2.2 MMOL/L (ref 0.7–2)
LACTATE SERPL-SCNC: 2.6 MMOL/L (ref 0.4–2)
LAMBDA LC SERPL-MCNC: 0.29 MG/DL (ref 0.57–2.63)
LAMBDA LC SERPL-MCNC: 0.29 MG/DL (ref 0.57–2.63)
LAMBDA LC SERPL-MCNC: 0.65 MG/DL (ref 0.57–2.63)
LAMBDA LC SERPL-MCNC: 0.66 MG/DL (ref 0.57–2.63)
LAMBDA LC SERPL-MCNC: 0.88 MG/DL (ref 0.57–2.63)
LEUKOCYTE ESTERASE UR QL STRIP: ABNORMAL
LYMPHOCYTES # BLD AUTO: 0.1 10E9/L (ref 0.8–5.3)
LYMPHOCYTES # BLD AUTO: 0.2 10E9/L (ref 0.8–5.3)
LYMPHOCYTES # BLD AUTO: 0.3 10E9/L (ref 0.8–5.3)
LYMPHOCYTES # BLD AUTO: 0.4 10E9/L (ref 0.8–5.3)
LYMPHOCYTES # BLD AUTO: 0.4 10E9/L (ref 0.8–5.3)
LYMPHOCYTES # BLD AUTO: 0.5 10E9/L (ref 0.8–5.3)
LYMPHOCYTES # BLD AUTO: 0.6 10E9/L (ref 0.8–5.3)
LYMPHOCYTES # BLD AUTO: 0.7 10E9/L (ref 0.8–5.3)
LYMPHOCYTES # BLD AUTO: 0.8 10E9/L (ref 0.8–5.3)
LYMPHOCYTES # BLD AUTO: 0.9 10E9/L (ref 0.8–5.3)
LYMPHOCYTES # BLD AUTO: 1 10E9/L (ref 0.8–5.3)
LYMPHOCYTES # BLD AUTO: 1.1 10E9/L (ref 0.8–5.3)
LYMPHOCYTES # BLD AUTO: 1.1 10E9/L (ref 0.8–5.3)
LYMPHOCYTES # BLD AUTO: 1.3 10E9/L (ref 0.8–5.3)
LYMPHOCYTES NFR BLD AUTO: 10.1 %
LYMPHOCYTES NFR BLD AUTO: 10.2 %
LYMPHOCYTES NFR BLD AUTO: 11 %
LYMPHOCYTES NFR BLD AUTO: 11 %
LYMPHOCYTES NFR BLD AUTO: 12.4 %
LYMPHOCYTES NFR BLD AUTO: 12.9 %
LYMPHOCYTES NFR BLD AUTO: 15 %
LYMPHOCYTES NFR BLD AUTO: 15.1 %
LYMPHOCYTES NFR BLD AUTO: 15.2 %
LYMPHOCYTES NFR BLD AUTO: 15.8 %
LYMPHOCYTES NFR BLD AUTO: 15.9 %
LYMPHOCYTES NFR BLD AUTO: 16.3 %
LYMPHOCYTES NFR BLD AUTO: 16.6 %
LYMPHOCYTES NFR BLD AUTO: 19 %
LYMPHOCYTES NFR BLD AUTO: 19.7 %
LYMPHOCYTES NFR BLD AUTO: 2 %
LYMPHOCYTES NFR BLD AUTO: 20 %
LYMPHOCYTES NFR BLD AUTO: 22.6 %
LYMPHOCYTES NFR BLD AUTO: 24.6 %
LYMPHOCYTES NFR BLD AUTO: 24.7 %
LYMPHOCYTES NFR BLD AUTO: 24.7 %
LYMPHOCYTES NFR BLD AUTO: 25.8 %
LYMPHOCYTES NFR BLD AUTO: 26.7 %
LYMPHOCYTES NFR BLD AUTO: 26.7 %
LYMPHOCYTES NFR BLD AUTO: 26.9 %
LYMPHOCYTES NFR BLD AUTO: 28.3 %
LYMPHOCYTES NFR BLD AUTO: 29.8 %
LYMPHOCYTES NFR BLD AUTO: 34.6 %
LYMPHOCYTES NFR BLD AUTO: 4.5 %
LYMPHOCYTES NFR BLD AUTO: 4.7 %
LYMPHOCYTES NFR BLD AUTO: 5.9 %
LYMPHOCYTES NFR BLD AUTO: 6.1 %
LYMPHOCYTES NFR BLD AUTO: 6.1 %
LYMPHOCYTES NFR BLD AUTO: 8.2 %
LYMPHOCYTES NFR BLD AUTO: 9.5 %
Lab: NORMAL
M PROTEIN SERPL ELPH-MCNC: 1.6 G/DL
M PROTEIN SERPL ELPH-MCNC: 1.8 G/DL
M PROTEIN SERPL ELPH-MCNC: 2 G/DL
M PROTEIN SERPL ELPH-MCNC: 2 G/DL
M PROTEIN SERPL ELPH-MCNC: 3.3 G/DL
MAGNESIUM SERPL-MCNC: 1.8 MG/DL (ref 1.6–2.3)
MAGNESIUM SERPL-MCNC: 2 MG/DL (ref 1.6–2.3)
MAGNESIUM SERPL-MCNC: 2.2 MG/DL (ref 1.6–2.3)
MAGNESIUM SERPL-MCNC: 2.3 MG/DL (ref 1.6–2.3)
MCH RBC QN AUTO: 34.7 PG (ref 26.5–33)
MCH RBC QN AUTO: 34.7 PG (ref 26.5–33)
MCH RBC QN AUTO: 34.9 PG (ref 26.5–33)
MCH RBC QN AUTO: 35 PG (ref 26.5–33)
MCH RBC QN AUTO: 35 PG (ref 26.5–33)
MCH RBC QN AUTO: 35.1 PG (ref 26.5–33)
MCH RBC QN AUTO: 35.4 PG (ref 26.5–33)
MCH RBC QN AUTO: 35.5 PG (ref 26.5–33)
MCH RBC QN AUTO: 35.5 PG (ref 26.5–33)
MCH RBC QN AUTO: 35.6 PG (ref 26.5–33)
MCH RBC QN AUTO: 35.7 PG (ref 26.5–33)
MCH RBC QN AUTO: 35.8 PG (ref 26.5–33)
MCH RBC QN AUTO: 35.8 PG (ref 26.5–33)
MCH RBC QN AUTO: 35.9 PG (ref 26.5–33)
MCH RBC QN AUTO: 36 PG (ref 26.5–33)
MCH RBC QN AUTO: 36 PG (ref 26.5–33)
MCH RBC QN AUTO: 36.4 PG (ref 26.5–33)
MCH RBC QN AUTO: 36.4 PG (ref 26.5–33)
MCH RBC QN AUTO: 36.5 PG (ref 26.5–33)
MCH RBC QN AUTO: 36.5 PG (ref 26.5–33)
MCH RBC QN AUTO: 36.6 PG (ref 26.5–33)
MCH RBC QN AUTO: 36.7 PG (ref 26.5–33)
MCH RBC QN AUTO: 36.8 PG (ref 26.5–33)
MCH RBC QN AUTO: 36.9 PG (ref 26.5–33)
MCH RBC QN AUTO: 37.1 PG (ref 26.5–33)
MCH RBC QN AUTO: 37.2 PG (ref 26.5–33)
MCH RBC QN AUTO: 37.2 PG (ref 26.5–33)
MCH RBC QN AUTO: 37.3 PG (ref 26.5–33)
MCH RBC QN AUTO: 37.8 PG (ref 26.5–33)
MCH RBC QN AUTO: 38.4 PG (ref 26.5–33)
MCH RBC QN AUTO: NORMAL PG (ref 26.5–33)
MCHC RBC AUTO-ENTMCNC: 31.2 G/DL (ref 31.5–36.5)
MCHC RBC AUTO-ENTMCNC: 31.3 G/DL (ref 31.5–36.5)
MCHC RBC AUTO-ENTMCNC: 31.6 G/DL (ref 31.5–36.5)
MCHC RBC AUTO-ENTMCNC: 31.8 G/DL (ref 31.5–36.5)
MCHC RBC AUTO-ENTMCNC: 31.8 G/DL (ref 31.5–36.5)
MCHC RBC AUTO-ENTMCNC: 31.9 G/DL (ref 31.5–36.5)
MCHC RBC AUTO-ENTMCNC: 32 G/DL (ref 31.5–36.5)
MCHC RBC AUTO-ENTMCNC: 32.2 G/DL (ref 31.5–36.5)
MCHC RBC AUTO-ENTMCNC: 32.3 G/DL (ref 31.5–36.5)
MCHC RBC AUTO-ENTMCNC: 32.4 G/DL (ref 31.5–36.5)
MCHC RBC AUTO-ENTMCNC: 32.5 G/DL (ref 31.5–36.5)
MCHC RBC AUTO-ENTMCNC: 32.6 G/DL (ref 31.5–36.5)
MCHC RBC AUTO-ENTMCNC: 32.6 G/DL (ref 31.5–36.5)
MCHC RBC AUTO-ENTMCNC: 32.7 G/DL (ref 31.5–36.5)
MCHC RBC AUTO-ENTMCNC: 32.8 G/DL (ref 31.5–36.5)
MCHC RBC AUTO-ENTMCNC: 32.9 G/DL (ref 31.5–36.5)
MCHC RBC AUTO-ENTMCNC: 33.2 G/DL (ref 31.5–36.5)
MCHC RBC AUTO-ENTMCNC: 33.2 G/DL (ref 31.5–36.5)
MCHC RBC AUTO-ENTMCNC: 33.3 G/DL (ref 31.5–36.5)
MCHC RBC AUTO-ENTMCNC: 33.3 G/DL (ref 31.5–36.5)
MCHC RBC AUTO-ENTMCNC: 33.9 G/DL (ref 31.5–36.5)
MCHC RBC AUTO-ENTMCNC: 34.2 G/DL (ref 31.5–36.5)
MCHC RBC AUTO-ENTMCNC: NORMAL G/DL (ref 31.5–36.5)
MCV RBC AUTO: 106 FL (ref 78–100)
MCV RBC AUTO: 107 FL (ref 78–100)
MCV RBC AUTO: 107 FL (ref 78–100)
MCV RBC AUTO: 108 FL (ref 78–100)
MCV RBC AUTO: 109 FL (ref 78–100)
MCV RBC AUTO: 110 FL (ref 78–100)
MCV RBC AUTO: 111 FL (ref 78–100)
MCV RBC AUTO: 112 FL (ref 78–100)
MCV RBC AUTO: 113 FL (ref 78–100)
MCV RBC AUTO: 114 FL (ref 78–100)
MCV RBC AUTO: 115 FL (ref 78–100)
MCV RBC AUTO: 115 FL (ref 78–100)
MCV RBC AUTO: 117 FL (ref 78–100)
MCV RBC AUTO: 119 FL (ref 78–100)
MCV RBC AUTO: NORMAL FL (ref 78–100)
METAMYELOCYTES # BLD: 0 10E9/L
METAMYELOCYTES # BLD: 0.2 10E9/L
METAMYELOCYTES NFR BLD MANUAL: 1 %
METAMYELOCYTES NFR BLD MANUAL: 3 %
MICROALBUMIN UR-MCNC: 23 MG/L
MICROALBUMIN/CREAT UR: 96.64 MG/G CR (ref 0–17)
MONOCYTES # BLD AUTO: 0 10E9/L (ref 0–1.3)
MONOCYTES # BLD AUTO: 0 10E9/L (ref 0–1.3)
MONOCYTES # BLD AUTO: 0.1 10E9/L (ref 0–1.3)
MONOCYTES # BLD AUTO: 0.2 10E9/L (ref 0–1.3)
MONOCYTES # BLD AUTO: 0.3 10E9/L (ref 0–1.3)
MONOCYTES # BLD AUTO: 0.4 10E9/L (ref 0–1.3)
MONOCYTES # BLD AUTO: 0.4 10E9/L (ref 0–1.3)
MONOCYTES # BLD AUTO: 0.5 10E9/L (ref 0–1.3)
MONOCYTES # BLD AUTO: 0.5 10E9/L (ref 0–1.3)
MONOCYTES NFR BLD AUTO: 0 %
MONOCYTES NFR BLD AUTO: 0.7 %
MONOCYTES NFR BLD AUTO: 1.7 %
MONOCYTES NFR BLD AUTO: 10.2 %
MONOCYTES NFR BLD AUTO: 15 %
MONOCYTES NFR BLD AUTO: 2 %
MONOCYTES NFR BLD AUTO: 2.2 %
MONOCYTES NFR BLD AUTO: 2.9 %
MONOCYTES NFR BLD AUTO: 3 %
MONOCYTES NFR BLD AUTO: 3.1 %
MONOCYTES NFR BLD AUTO: 3.6 %
MONOCYTES NFR BLD AUTO: 3.8 %
MONOCYTES NFR BLD AUTO: 4 %
MONOCYTES NFR BLD AUTO: 4.2 %
MONOCYTES NFR BLD AUTO: 4.4 %
MONOCYTES NFR BLD AUTO: 4.5 %
MONOCYTES NFR BLD AUTO: 4.7 %
MONOCYTES NFR BLD AUTO: 4.9 %
MONOCYTES NFR BLD AUTO: 5 %
MONOCYTES NFR BLD AUTO: 5.8 %
MONOCYTES NFR BLD AUTO: 5.9 %
MONOCYTES NFR BLD AUTO: 6 %
MONOCYTES NFR BLD AUTO: 6.1 %
MONOCYTES NFR BLD AUTO: 6.1 %
MONOCYTES NFR BLD AUTO: 6.4 %
MONOCYTES NFR BLD AUTO: 7 %
MONOCYTES NFR BLD AUTO: 7.1 %
MONOCYTES NFR BLD AUTO: 7.3 %
MONOCYTES NFR BLD AUTO: 7.6 %
MONOCYTES NFR BLD AUTO: 8 %
MONOCYTES NFR BLD AUTO: 8.2 %
MONOCYTES NFR BLD AUTO: 8.2 %
MONOCYTES NFR BLD AUTO: 9 %
MONOCYTES NFR BLD AUTO: 9.1 %
MONOCYTES NFR BLD AUTO: 9.8 %
MUCOUS THREADS #/AREA URNS LPF: PRESENT /LPF
MYELOCYTES # BLD: 0.1 10E9/L
MYELOCYTES # BLD: 0.1 10E9/L
MYELOCYTES NFR BLD MANUAL: 2 %
MYELOCYTES NFR BLD MANUAL: 3 %
NEUTROPHILS # BLD AUTO: 1.5 10E9/L (ref 1.6–8.3)
NEUTROPHILS # BLD AUTO: 1.6 10E9/L (ref 1.6–8.3)
NEUTROPHILS # BLD AUTO: 1.7 10E9/L (ref 1.6–8.3)
NEUTROPHILS # BLD AUTO: 1.8 10E9/L (ref 1.6–8.3)
NEUTROPHILS # BLD AUTO: 1.9 10E9/L (ref 1.6–8.3)
NEUTROPHILS # BLD AUTO: 2.1 10E9/L (ref 1.6–8.3)
NEUTROPHILS # BLD AUTO: 2.2 10E9/L (ref 1.6–8.3)
NEUTROPHILS # BLD AUTO: 2.3 10E9/L (ref 1.6–8.3)
NEUTROPHILS # BLD AUTO: 2.4 10E9/L (ref 1.6–8.3)
NEUTROPHILS # BLD AUTO: 2.6 10E9/L (ref 1.6–8.3)
NEUTROPHILS # BLD AUTO: 2.7 10E9/L (ref 1.6–8.3)
NEUTROPHILS # BLD AUTO: 2.7 10E9/L (ref 1.6–8.3)
NEUTROPHILS # BLD AUTO: 2.8 10E9/L (ref 1.6–8.3)
NEUTROPHILS # BLD AUTO: 2.9 10E9/L (ref 1.6–8.3)
NEUTROPHILS # BLD AUTO: 3.1 10E9/L (ref 1.6–8.3)
NEUTROPHILS # BLD AUTO: 3.2 10E9/L (ref 1.6–8.3)
NEUTROPHILS # BLD AUTO: 3.5 10E9/L (ref 1.6–8.3)
NEUTROPHILS # BLD AUTO: 3.5 10E9/L (ref 1.6–8.3)
NEUTROPHILS # BLD AUTO: 3.6 10E9/L (ref 1.6–8.3)
NEUTROPHILS # BLD AUTO: 3.7 10E9/L (ref 1.6–8.3)
NEUTROPHILS # BLD AUTO: 3.9 10E9/L (ref 1.6–8.3)
NEUTROPHILS # BLD AUTO: 3.9 10E9/L (ref 1.6–8.3)
NEUTROPHILS # BLD AUTO: 4 10E9/L (ref 1.6–8.3)
NEUTROPHILS # BLD AUTO: 4.2 10E9/L (ref 1.6–8.3)
NEUTROPHILS # BLD AUTO: 4.5 10E9/L (ref 1.6–8.3)
NEUTROPHILS # BLD AUTO: 5 10E9/L (ref 1.6–8.3)
NEUTROPHILS # BLD AUTO: 5.1 10E9/L (ref 1.6–8.3)
NEUTROPHILS # BLD AUTO: 5.2 10E9/L (ref 1.6–8.3)
NEUTROPHILS NFR BLD AUTO: 58.1 %
NEUTROPHILS NFR BLD AUTO: 62.5 %
NEUTROPHILS NFR BLD AUTO: 62.7 %
NEUTROPHILS NFR BLD AUTO: 63.7 %
NEUTROPHILS NFR BLD AUTO: 63.8 %
NEUTROPHILS NFR BLD AUTO: 64.5 %
NEUTROPHILS NFR BLD AUTO: 64.6 %
NEUTROPHILS NFR BLD AUTO: 67.3 %
NEUTROPHILS NFR BLD AUTO: 67.8 %
NEUTROPHILS NFR BLD AUTO: 68.1 %
NEUTROPHILS NFR BLD AUTO: 69.8 %
NEUTROPHILS NFR BLD AUTO: 70 %
NEUTROPHILS NFR BLD AUTO: 70.4 %
NEUTROPHILS NFR BLD AUTO: 71.9 %
NEUTROPHILS NFR BLD AUTO: 72.9 %
NEUTROPHILS NFR BLD AUTO: 73 %
NEUTROPHILS NFR BLD AUTO: 73.7 %
NEUTROPHILS NFR BLD AUTO: 74.2 %
NEUTROPHILS NFR BLD AUTO: 74.4 %
NEUTROPHILS NFR BLD AUTO: 75.3 %
NEUTROPHILS NFR BLD AUTO: 76.3 %
NEUTROPHILS NFR BLD AUTO: 77.4 %
NEUTROPHILS NFR BLD AUTO: 79.3 %
NEUTROPHILS NFR BLD AUTO: 79.3 %
NEUTROPHILS NFR BLD AUTO: 80 %
NEUTROPHILS NFR BLD AUTO: 84.2 %
NEUTROPHILS NFR BLD AUTO: 85.8 %
NEUTROPHILS NFR BLD AUTO: 86.1 %
NEUTROPHILS NFR BLD AUTO: 86.4 %
NEUTROPHILS NFR BLD AUTO: 88.1 %
NEUTROPHILS NFR BLD AUTO: 88.6 %
NEUTROPHILS NFR BLD AUTO: 90 %
NEUTROPHILS NFR BLD AUTO: 90.9 %
NEUTROPHILS NFR BLD AUTO: 93 %
NEUTROPHILS NFR BLD AUTO: 93.5 %
NITRATE UR QL: NEGATIVE
NITRATE UR QL: POSITIVE
NON-SQ EPI CELLS #/AREA URNS LPF: ABNORMAL /LPF
NRBC # BLD AUTO: 0 10*3/UL
NRBC # BLD AUTO: 0.1 10*3/UL
NRBC # BLD AUTO: 0.2 10*3/UL
NRBC BLD AUTO-RTO: 0 /100
NRBC BLD AUTO-RTO: 1 /100
NRBC BLD AUTO-RTO: 2 /100
NRBC BLD AUTO-RTO: 3 /100
NRBC BLD AUTO-RTO: 3 /100
NRBC BLD AUTO-RTO: 4 /100
NT-PROBNP SERPL-MCNC: 7826 PG/ML (ref 0–1800)
NUM BPU REQUESTED: 1
OVALOCYTES BLD QL SMEAR: SLIGHT
PH UR STRIP: 5 PH (ref 5–7)
PH UR STRIP: 5.5 PH (ref 5–7)
PH UR STRIP: 6 PH (ref 5–7)
PHOSPHATE SERPL-MCNC: 2.2 MG/DL (ref 2.5–4.5)
PHOSPHATE SERPL-MCNC: 2.9 MG/DL (ref 2.5–4.5)
PLATELET # BLD AUTO: 107 10E9/L (ref 150–450)
PLATELET # BLD AUTO: 108 10E9/L (ref 150–450)
PLATELET # BLD AUTO: 109 10E9/L (ref 150–450)
PLATELET # BLD AUTO: 111 10E9/L (ref 150–450)
PLATELET # BLD AUTO: 119 10E9/L (ref 150–450)
PLATELET # BLD AUTO: 123 10E9/L (ref 150–450)
PLATELET # BLD AUTO: 126 10E9/L (ref 150–450)
PLATELET # BLD AUTO: 129 10E9/L (ref 150–450)
PLATELET # BLD AUTO: 152 10E9/L (ref 150–450)
PLATELET # BLD AUTO: 157 10E9/L (ref 150–450)
PLATELET # BLD AUTO: 161 10E9/L (ref 150–450)
PLATELET # BLD AUTO: 178 10E9/L (ref 150–450)
PLATELET # BLD AUTO: 195 10E9/L (ref 150–450)
PLATELET # BLD AUTO: 221 10E9/L (ref 150–450)
PLATELET # BLD AUTO: 36 10E9/L (ref 150–450)
PLATELET # BLD AUTO: 38 10E9/L (ref 150–450)
PLATELET # BLD AUTO: 40 10E9/L (ref 150–450)
PLATELET # BLD AUTO: 41 10E9/L (ref 150–450)
PLATELET # BLD AUTO: 42 10E9/L (ref 150–450)
PLATELET # BLD AUTO: 43 10E9/L (ref 150–450)
PLATELET # BLD AUTO: 43 10E9/L (ref 150–450)
PLATELET # BLD AUTO: 44 10E9/L (ref 150–450)
PLATELET # BLD AUTO: 46 10E9/L (ref 150–450)
PLATELET # BLD AUTO: 46 10E9/L (ref 150–450)
PLATELET # BLD AUTO: 48 10E9/L (ref 150–450)
PLATELET # BLD AUTO: 50 10E9/L (ref 150–450)
PLATELET # BLD AUTO: 57 10E9/L (ref 150–450)
PLATELET # BLD AUTO: 63 10E9/L (ref 150–450)
PLATELET # BLD AUTO: 68 10E9/L (ref 150–450)
PLATELET # BLD AUTO: 70 10E9/L (ref 150–450)
PLATELET # BLD AUTO: 72 10E9/L (ref 150–450)
PLATELET # BLD AUTO: 72 10E9/L (ref 150–450)
PLATELET # BLD AUTO: 73 10E9/L (ref 150–450)
PLATELET # BLD AUTO: 82 10E9/L (ref 150–450)
PLATELET # BLD AUTO: 85 10E9/L (ref 150–450)
PLATELET # BLD AUTO: 88 10E9/L (ref 150–450)
PLATELET # BLD AUTO: 91 10E9/L (ref 150–450)
PLATELET # BLD AUTO: 92 10E9/L (ref 150–450)
PLATELET # BLD AUTO: 94 10E9/L (ref 150–450)
PLATELET # BLD AUTO: 96 10E9/L (ref 150–450)
PLATELET # BLD AUTO: 99 10E9/L (ref 150–450)
PLATELET # BLD AUTO: NORMAL 10E9/L (ref 150–450)
PLATELET # BLD EST: ABNORMAL 10*3/UL
POLYCHROMASIA BLD QL SMEAR: ABNORMAL
POLYCHROMASIA BLD QL SMEAR: SLIGHT
POLYCHROMASIA BLD QL SMEAR: SLIGHT
POTASSIUM SERPL-SCNC: 3.5 MMOL/L (ref 3.4–5.3)
POTASSIUM SERPL-SCNC: 3.5 MMOL/L (ref 3.4–5.3)
POTASSIUM SERPL-SCNC: 3.6 MMOL/L (ref 3.4–5.3)
POTASSIUM SERPL-SCNC: 3.7 MMOL/L (ref 3.4–5.3)
POTASSIUM SERPL-SCNC: 3.8 MMOL/L (ref 3.4–5.3)
POTASSIUM SERPL-SCNC: 3.9 MMOL/L (ref 3.4–5.3)
POTASSIUM SERPL-SCNC: 4 MMOL/L (ref 3.4–5.3)
POTASSIUM SERPL-SCNC: 4 MMOL/L (ref 3.4–5.3)
POTASSIUM SERPL-SCNC: 4.1 MMOL/L (ref 3.4–5.3)
POTASSIUM SERPL-SCNC: 4.2 MMOL/L (ref 3.4–5.3)
POTASSIUM SERPL-SCNC: 4.3 MMOL/L (ref 3.4–5.3)
POTASSIUM SERPL-SCNC: 4.5 MMOL/L (ref 3.4–5.3)
POTASSIUM SERPL-SCNC: 4.5 MMOL/L (ref 3.4–5.3)
POTASSIUM SERPL-SCNC: 4.7 MMOL/L (ref 3.4–5.3)
POTASSIUM SERPL-SCNC: 4.8 MMOL/L (ref 3.4–5.3)
PROCALCITONIN SERPL-MCNC: 0.84 NG/ML
PROT PATTERN SERPL ELPH-IMP: ABNORMAL
PROT PATTERN SERPL IFE-IMP: ABNORMAL
PROT SERPL-MCNC: 10 G/DL (ref 6.8–8.8)
PROT SERPL-MCNC: 7.3 G/DL (ref 6.8–8.8)
PROT SERPL-MCNC: 7.5 G/DL (ref 6.8–8.8)
PROT SERPL-MCNC: 7.8 G/DL (ref 6.8–8.8)
PROT SERPL-MCNC: 7.8 G/DL (ref 6.8–8.8)
PROT SERPL-MCNC: 7.9 G/DL (ref 6.8–8.8)
PROT SERPL-MCNC: 7.9 G/DL (ref 6.8–8.8)
PROT SERPL-MCNC: 8 G/DL (ref 6.8–8.8)
PROT SERPL-MCNC: 8.1 G/DL (ref 6.8–8.8)
PROT SERPL-MCNC: 8.4 G/DL (ref 6.8–8.8)
PROT SERPL-MCNC: 9.5 G/DL (ref 6.8–8.8)
PROT SERPL-MCNC: 9.5 G/DL (ref 6.8–8.8)
RBC # BLD AUTO: 2.11 10E12/L (ref 4.4–5.9)
RBC # BLD AUTO: 2.16 10E12/L (ref 4.4–5.9)
RBC # BLD AUTO: 2.17 10E12/L (ref 4.4–5.9)
RBC # BLD AUTO: 2.17 10E12/L (ref 4.4–5.9)
RBC # BLD AUTO: 2.22 10E12/L (ref 4.4–5.9)
RBC # BLD AUTO: 2.23 10E12/L (ref 4.4–5.9)
RBC # BLD AUTO: 2.25 10E12/L (ref 4.4–5.9)
RBC # BLD AUTO: 2.26 10E12/L (ref 4.4–5.9)
RBC # BLD AUTO: 2.27 10E12/L (ref 4.4–5.9)
RBC # BLD AUTO: 2.28 10E12/L (ref 4.4–5.9)
RBC # BLD AUTO: 2.28 10E12/L (ref 4.4–5.9)
RBC # BLD AUTO: 2.29 10E12/L (ref 4.4–5.9)
RBC # BLD AUTO: 2.29 10E12/L (ref 4.4–5.9)
RBC # BLD AUTO: 2.32 10E12/L (ref 4.4–5.9)
RBC # BLD AUTO: 2.36 10E12/L (ref 4.4–5.9)
RBC # BLD AUTO: 2.36 10E12/L (ref 4.4–5.9)
RBC # BLD AUTO: 2.37 10E12/L (ref 4.4–5.9)
RBC # BLD AUTO: 2.37 10E12/L (ref 4.4–5.9)
RBC # BLD AUTO: 2.38 10E12/L (ref 4.4–5.9)
RBC # BLD AUTO: 2.39 10E12/L (ref 4.4–5.9)
RBC # BLD AUTO: 2.4 10E12/L (ref 4.4–5.9)
RBC # BLD AUTO: 2.41 10E12/L (ref 4.4–5.9)
RBC # BLD AUTO: 2.42 10E12/L (ref 4.4–5.9)
RBC # BLD AUTO: 2.45 10E12/L (ref 4.4–5.9)
RBC # BLD AUTO: 2.46 10E12/L (ref 4.4–5.9)
RBC # BLD AUTO: 2.49 10E12/L (ref 4.4–5.9)
RBC # BLD AUTO: 2.52 10E12/L (ref 4.4–5.9)
RBC # BLD AUTO: 2.52 10E12/L (ref 4.4–5.9)
RBC # BLD AUTO: 2.6 10E12/L (ref 4.4–5.9)
RBC # BLD AUTO: 2.61 10E12/L (ref 4.4–5.9)
RBC # BLD AUTO: 2.62 10E12/L (ref 4.4–5.9)
RBC # BLD AUTO: 2.74 10E12/L (ref 4.4–5.9)
RBC # BLD AUTO: 2.77 10E12/L (ref 4.4–5.9)
RBC # BLD AUTO: 2.81 10E12/L (ref 4.4–5.9)
RBC # BLD AUTO: 2.88 10E12/L (ref 4.4–5.9)
RBC # BLD AUTO: 2.9 10E12/L (ref 4.4–5.9)
RBC # BLD AUTO: NORMAL 10E12/L (ref 4.4–5.9)
RBC #/AREA URNS AUTO: 0 /HPF (ref 0–2)
RBC #/AREA URNS AUTO: 2 /HPF (ref 0–2)
RBC #/AREA URNS AUTO: 2 /HPF (ref 0–2)
RBC #/AREA URNS AUTO: >182 /HPF (ref 0–2)
RBC #/AREA URNS AUTO: ABNORMAL /HPF
RBC MORPH BLD: ABNORMAL
ROULEAUX BLD QL SMEAR: ABNORMAL
SODIUM SERPL-SCNC: 132 MMOL/L (ref 133–144)
SODIUM SERPL-SCNC: 134 MMOL/L (ref 133–144)
SODIUM SERPL-SCNC: 135 MMOL/L (ref 133–144)
SODIUM SERPL-SCNC: 137 MMOL/L (ref 133–144)
SODIUM SERPL-SCNC: 138 MMOL/L (ref 133–144)
SODIUM SERPL-SCNC: 139 MMOL/L (ref 133–144)
SODIUM SERPL-SCNC: 140 MMOL/L (ref 133–144)
SODIUM SERPL-SCNC: 141 MMOL/L (ref 133–144)
SODIUM SERPL-SCNC: 142 MMOL/L (ref 133–144)
SODIUM SERPL-SCNC: 142 MMOL/L (ref 133–144)
SOURCE: ABNORMAL
SP GR UR STRIP: 1.01 (ref 1–1.03)
SP GR UR STRIP: 1.02 (ref 1–1.03)
SP GR UR STRIP: <=1.005 (ref 1–1.03)
SPECIMEN EXP DATE BLD: NORMAL
SPECIMEN SOURCE: ABNORMAL
SPECIMEN SOURCE: ABNORMAL
SPECIMEN SOURCE: NORMAL
SQUAMOUS #/AREA URNS AUTO: 1 /HPF (ref 0–1)
SQUAMOUS #/AREA URNS AUTO: 2 /HPF (ref 0–1)
SQUAMOUS #/AREA URNS AUTO: 3 /HPF (ref 0–1)
T4 FREE SERPL-MCNC: 0.81 NG/DL (ref 0.76–1.46)
TOXIC GRANULES BLD QL SMEAR: PRESENT
TRANSFUSION STATUS PATIENT QL: NORMAL
TROPONIN I SERPL-MCNC: 0.02 UG/L (ref 0–0.04)
TROPONIN I SERPL-MCNC: <0.015 UG/L (ref 0–0.04)
TROPONIN I SERPL-MCNC: <0.015 UG/L (ref 0–0.04)
TSH SERPL DL<=0.005 MIU/L-ACNC: 1.78 MU/L (ref 0.4–4)
TSH SERPL DL<=0.005 MIU/L-ACNC: 10.08 MU/L (ref 0.4–4)
TSH SERPL DL<=0.005 MIU/L-ACNC: 3.52 MU/L (ref 0.4–4)
UROBILINOGEN UR STRIP-ACNC: 0.2 EU/DL (ref 0.2–1)
UROBILINOGEN UR STRIP-MCNC: 2 MG/DL (ref 0–2)
UROBILINOGEN UR STRIP-MCNC: NORMAL MG/DL (ref 0–2)
WBC # BLD AUTO: 2.3 10E9/L (ref 4–11)
WBC # BLD AUTO: 2.4 10E9/L (ref 4–11)
WBC # BLD AUTO: 2.5 10E9/L (ref 4–11)
WBC # BLD AUTO: 2.7 10E9/L (ref 4–11)
WBC # BLD AUTO: 2.7 10E9/L (ref 4–11)
WBC # BLD AUTO: 3 10E9/L (ref 4–11)
WBC # BLD AUTO: 3 10E9/L (ref 4–11)
WBC # BLD AUTO: 3.1 10E9/L (ref 4–11)
WBC # BLD AUTO: 3.4 10E9/L (ref 4–11)
WBC # BLD AUTO: 3.4 10E9/L (ref 4–11)
WBC # BLD AUTO: 3.5 10E9/L (ref 4–11)
WBC # BLD AUTO: 3.6 10E9/L (ref 4–11)
WBC # BLD AUTO: 3.7 10E9/L (ref 4–11)
WBC # BLD AUTO: 4 10E9/L (ref 4–11)
WBC # BLD AUTO: 4.1 10E9/L (ref 4–11)
WBC # BLD AUTO: 4.1 10E9/L (ref 4–11)
WBC # BLD AUTO: 4.2 10E9/L (ref 4–11)
WBC # BLD AUTO: 4.2 10E9/L (ref 4–11)
WBC # BLD AUTO: 4.3 10E9/L (ref 4–11)
WBC # BLD AUTO: 4.3 10E9/L (ref 4–11)
WBC # BLD AUTO: 4.5 10E9/L (ref 4–11)
WBC # BLD AUTO: 4.6 10E9/L (ref 4–11)
WBC # BLD AUTO: 4.6 10E9/L (ref 4–11)
WBC # BLD AUTO: 4.7 10E9/L (ref 4–11)
WBC # BLD AUTO: 4.9 10E9/L (ref 4–11)
WBC # BLD AUTO: 5.1 10E9/L (ref 4–11)
WBC # BLD AUTO: 5.2 10E9/L (ref 4–11)
WBC # BLD AUTO: 5.3 10E9/L (ref 4–11)
WBC # BLD AUTO: 5.6 10E9/L (ref 4–11)
WBC # BLD AUTO: 5.9 10E9/L (ref 4–11)
WBC # BLD AUTO: 6.3 10E9/L (ref 4–11)
WBC # BLD AUTO: NORMAL 10E9/L (ref 4–11)
WBC #/AREA URNS AUTO: 16 /HPF (ref 0–5)
WBC #/AREA URNS AUTO: 29 /HPF (ref 0–5)
WBC #/AREA URNS AUTO: 44 /HPF (ref 0–2)
WBC #/AREA URNS AUTO: >182 /HPF (ref 0–5)
WBC #/AREA URNS AUTO: ABNORMAL /HPF
WBC CLUMPS #/AREA URNS HPF: PRESENT /HPF

## 2018-01-01 PROCEDURE — 40000893 ZZH STATISTIC PT IP EVAL DEFER

## 2018-01-01 PROCEDURE — 25000132 ZZH RX MED GY IP 250 OP 250 PS 637: Mod: GY | Performed by: INTERNAL MEDICINE

## 2018-01-01 PROCEDURE — 12000000 ZZH R&B MED SURG/OB

## 2018-01-01 PROCEDURE — 84145 PROCALCITONIN (PCT): CPT | Performed by: INTERNAL MEDICINE

## 2018-01-01 PROCEDURE — 97161 PT EVAL LOW COMPLEX 20 MIN: CPT | Mod: GP | Performed by: PHYSICAL THERAPIST

## 2018-01-01 PROCEDURE — 80048 BASIC METABOLIC PNL TOTAL CA: CPT | Performed by: INTERNAL MEDICINE

## 2018-01-01 PROCEDURE — 99214 OFFICE O/P EST MOD 30 MIN: CPT | Performed by: NURSE PRACTITIONER

## 2018-01-01 PROCEDURE — 36415 COLL VENOUS BLD VENIPUNCTURE: CPT | Performed by: INTERNAL MEDICINE

## 2018-01-01 PROCEDURE — 99233 SBSQ HOSP IP/OBS HIGH 50: CPT | Performed by: INTERNAL MEDICINE

## 2018-01-01 PROCEDURE — 00000402 ZZHCL STATISTIC TOTAL PROTEIN: Performed by: INTERNAL MEDICINE

## 2018-01-01 PROCEDURE — 96365 THER/PROPH/DIAG IV INF INIT: CPT

## 2018-01-01 PROCEDURE — 36415 COLL VENOUS BLD VENIPUNCTURE: CPT

## 2018-01-01 PROCEDURE — 99310 SBSQ NF CARE HIGH MDM 45: CPT | Performed by: NURSE PRACTITIONER

## 2018-01-01 PROCEDURE — 99215 OFFICE O/P EST HI 40 MIN: CPT | Performed by: INTERNAL MEDICINE

## 2018-01-01 PROCEDURE — 71250 CT THORAX DX C-: CPT

## 2018-01-01 PROCEDURE — 81001 URINALYSIS AUTO W/SCOPE: CPT | Performed by: FAMILY MEDICINE

## 2018-01-01 PROCEDURE — 82784 ASSAY IGA/IGD/IGG/IGM EACH: CPT | Performed by: INTERNAL MEDICINE

## 2018-01-01 PROCEDURE — A9270 NON-COVERED ITEM OR SERVICE: HCPCS | Mod: GY | Performed by: INTERNAL MEDICINE

## 2018-01-01 PROCEDURE — 84520 ASSAY OF UREA NITROGEN: CPT | Performed by: FAMILY MEDICINE

## 2018-01-01 PROCEDURE — 85025 COMPLETE CBC W/AUTO DIFF WBC: CPT | Performed by: INTERNAL MEDICINE

## 2018-01-01 PROCEDURE — 40000133 ZZH STATISTIC OT WARD VISIT: Performed by: OCCUPATIONAL THERAPIST

## 2018-01-01 PROCEDURE — 86923 COMPATIBILITY TEST ELECTRIC: CPT | Performed by: INTERNAL MEDICINE

## 2018-01-01 PROCEDURE — 93005 ELECTROCARDIOGRAM TRACING: CPT

## 2018-01-01 PROCEDURE — 27210995 ZZH RX 272: Performed by: INTERNAL MEDICINE

## 2018-01-01 PROCEDURE — 27211289 ZZ H KIT CATH IV 4FR 8 CM OR 10 CM, POWERWAND QUICK XL

## 2018-01-01 PROCEDURE — 99223 1ST HOSP IP/OBS HIGH 75: CPT | Mod: AI | Performed by: INTERNAL MEDICINE

## 2018-01-01 PROCEDURE — 96374 THER/PROPH/DIAG INJ IV PUSH: CPT

## 2018-01-01 PROCEDURE — 25000131 ZZH RX MED GY IP 250 OP 636 PS 637: Mod: GY | Performed by: INTERNAL MEDICINE

## 2018-01-01 PROCEDURE — 96413 CHEMO IV INFUSION 1 HR: CPT

## 2018-01-01 PROCEDURE — 99207 ZZC APP CREDIT; MD BILLING SHARED VISIT: CPT | Performed by: INTERNAL MEDICINE

## 2018-01-01 PROCEDURE — 25000128 H RX IP 250 OP 636: Mod: JW | Performed by: INTERNAL MEDICINE

## 2018-01-01 PROCEDURE — 25000128 H RX IP 250 OP 636: Performed by: INTERNAL MEDICINE

## 2018-01-01 PROCEDURE — 00000146 ZZHCL STATISTIC GLUCOSE BY METER IP

## 2018-01-01 PROCEDURE — 86901 BLOOD TYPING SEROLOGIC RH(D): CPT | Performed by: INTERNAL MEDICINE

## 2018-01-01 PROCEDURE — 25500064 ZZH RX 255 OP 636: Performed by: INTERNAL MEDICINE

## 2018-01-01 PROCEDURE — 88112 CYTOPATH CELL ENHANCE TECH: CPT | Performed by: PHYSICIAN ASSISTANT

## 2018-01-01 PROCEDURE — 99222 1ST HOSP IP/OBS MODERATE 55: CPT | Performed by: INTERNAL MEDICINE

## 2018-01-01 PROCEDURE — 25000125 ZZHC RX 250: Performed by: INTERNAL MEDICINE

## 2018-01-01 PROCEDURE — 92526 ORAL FUNCTION THERAPY: CPT | Mod: GN | Performed by: SPEECH-LANGUAGE PATHOLOGIST

## 2018-01-01 PROCEDURE — 96375 TX/PRO/DX INJ NEW DRUG ADDON: CPT

## 2018-01-01 PROCEDURE — 36415 COLL VENOUS BLD VENIPUNCTURE: CPT | Performed by: FAMILY MEDICINE

## 2018-01-01 PROCEDURE — 84165 PROTEIN E-PHORESIS SERUM: CPT | Performed by: INTERNAL MEDICINE

## 2018-01-01 PROCEDURE — 25000125 ZZHC RX 250: Performed by: HOSPITALIST

## 2018-01-01 PROCEDURE — 86850 RBC ANTIBODY SCREEN: CPT | Performed by: INTERNAL MEDICINE

## 2018-01-01 PROCEDURE — 25000132 ZZH RX MED GY IP 250 OP 250 PS 637: Mod: GY | Performed by: NURSE PRACTITIONER

## 2018-01-01 PROCEDURE — 83883 ASSAY NEPHELOMETRY NOT SPEC: CPT | Performed by: INTERNAL MEDICINE

## 2018-01-01 PROCEDURE — 99214 OFFICE O/P EST MOD 30 MIN: CPT | Performed by: INTERNAL MEDICINE

## 2018-01-01 PROCEDURE — 97110 THERAPEUTIC EXERCISES: CPT | Mod: GP

## 2018-01-01 PROCEDURE — 84443 ASSAY THYROID STIM HORMONE: CPT | Performed by: INTERNAL MEDICINE

## 2018-01-01 PROCEDURE — 99239 HOSP IP/OBS DSCHRG MGMT >30: CPT | Performed by: INTERNAL MEDICINE

## 2018-01-01 PROCEDURE — 94640 AIRWAY INHALATION TREATMENT: CPT

## 2018-01-01 PROCEDURE — 25000131 ZZH RX MED GY IP 250 OP 636 PS 637: Mod: GY

## 2018-01-01 PROCEDURE — 96361 HYDRATE IV INFUSION ADD-ON: CPT

## 2018-01-01 PROCEDURE — 83735 ASSAY OF MAGNESIUM: CPT | Performed by: EMERGENCY MEDICINE

## 2018-01-01 PROCEDURE — 96409 CHEMO IV PUSH SNGL DRUG: CPT

## 2018-01-01 PROCEDURE — 99306 1ST NF CARE HIGH MDM 50: CPT | Performed by: INTERNAL MEDICINE

## 2018-01-01 PROCEDURE — 97535 SELF CARE MNGMENT TRAINING: CPT | Mod: GO | Performed by: OCCUPATIONAL THERAPY ASSISTANT

## 2018-01-01 PROCEDURE — 93306 TTE W/DOPPLER COMPLETE: CPT

## 2018-01-01 PROCEDURE — 99223 1ST HOSP IP/OBS HIGH 75: CPT | Mod: AI | Performed by: NURSE PRACTITIONER

## 2018-01-01 PROCEDURE — 80053 COMPREHEN METABOLIC PANEL: CPT | Performed by: INTERNAL MEDICINE

## 2018-01-01 PROCEDURE — 25000132 ZZH RX MED GY IP 250 OP 250 PS 637: Mod: GY | Performed by: HOSPITALIST

## 2018-01-01 PROCEDURE — 99232 SBSQ HOSP IP/OBS MODERATE 35: CPT | Performed by: INTERNAL MEDICINE

## 2018-01-01 PROCEDURE — 82962 GLUCOSE BLOOD TEST: CPT

## 2018-01-01 PROCEDURE — 40000133 ZZH STATISTIC OT WARD VISIT

## 2018-01-01 PROCEDURE — 97116 GAIT TRAINING THERAPY: CPT | Mod: GP | Performed by: PHYSICAL THERAPY ASSISTANT

## 2018-01-01 PROCEDURE — 99214 OFFICE O/P EST MOD 30 MIN: CPT | Performed by: FAMILY MEDICINE

## 2018-01-01 PROCEDURE — 83605 ASSAY OF LACTIC ACID: CPT | Performed by: INTERNAL MEDICINE

## 2018-01-01 PROCEDURE — 87086 URINE CULTURE/COLONY COUNT: CPT | Performed by: EMERGENCY MEDICINE

## 2018-01-01 PROCEDURE — 80048 BASIC METABOLIC PNL TOTAL CA: CPT | Performed by: NURSE PRACTITIONER

## 2018-01-01 PROCEDURE — 27210995 ZZH RX 272

## 2018-01-01 PROCEDURE — 83036 HEMOGLOBIN GLYCOSYLATED A1C: CPT | Performed by: EMERGENCY MEDICINE

## 2018-01-01 PROCEDURE — 96401 CHEMO ANTI-NEOPL SQ/IM: CPT

## 2018-01-01 PROCEDURE — G0463 HOSPITAL OUTPT CLINIC VISIT: HCPCS | Mod: 25

## 2018-01-01 PROCEDURE — 97530 THERAPEUTIC ACTIVITIES: CPT | Mod: GO

## 2018-01-01 PROCEDURE — 83036 HEMOGLOBIN GLYCOSYLATED A1C: CPT | Performed by: FAMILY MEDICINE

## 2018-01-01 PROCEDURE — 25000128 H RX IP 250 OP 636: Performed by: NURSE PRACTITIONER

## 2018-01-01 PROCEDURE — 87040 BLOOD CULTURE FOR BACTERIA: CPT | Performed by: INTERNAL MEDICINE

## 2018-01-01 PROCEDURE — 99213 OFFICE O/P EST LOW 20 MIN: CPT | Performed by: INTERNAL MEDICINE

## 2018-01-01 PROCEDURE — 86900 BLOOD TYPING SEROLOGIC ABO: CPT | Performed by: INTERNAL MEDICINE

## 2018-01-01 PROCEDURE — 99215 OFFICE O/P EST HI 40 MIN: CPT | Performed by: NURSE PRACTITIONER

## 2018-01-01 PROCEDURE — 71046 X-RAY EXAM CHEST 2 VIEWS: CPT

## 2018-01-01 PROCEDURE — A9270 NON-COVERED ITEM OR SERVICE: HCPCS | Mod: GY | Performed by: NURSE PRACTITIONER

## 2018-01-01 PROCEDURE — 81001 URINALYSIS AUTO W/SCOPE: CPT | Performed by: EMERGENCY MEDICINE

## 2018-01-01 PROCEDURE — 99285 EMERGENCY DEPT VISIT HI MDM: CPT | Mod: 25

## 2018-01-01 PROCEDURE — 84484 ASSAY OF TROPONIN QUANT: CPT | Performed by: NURSE PRACTITIONER

## 2018-01-01 PROCEDURE — 97530 THERAPEUTIC ACTIVITIES: CPT | Mod: GP | Performed by: PHYSICAL THERAPIST

## 2018-01-01 PROCEDURE — 82565 ASSAY OF CREATININE: CPT | Performed by: FAMILY MEDICINE

## 2018-01-01 PROCEDURE — G0463 HOSPITAL OUTPT CLINIC VISIT: HCPCS

## 2018-01-01 PROCEDURE — 84484 ASSAY OF TROPONIN QUANT: CPT | Performed by: EMERGENCY MEDICINE

## 2018-01-01 PROCEDURE — 96372 THER/PROPH/DIAG INJ SC/IM: CPT

## 2018-01-01 PROCEDURE — 0399T ZZHC MYOCARDIAL STRAIN IMAGING: CPT | Performed by: INTERNAL MEDICINE

## 2018-01-01 PROCEDURE — 25000128 H RX IP 250 OP 636: Mod: EA | Performed by: INTERNAL MEDICINE

## 2018-01-01 PROCEDURE — A9270 NON-COVERED ITEM OR SERVICE: HCPCS | Mod: GY | Performed by: EMERGENCY MEDICINE

## 2018-01-01 PROCEDURE — 97535 SELF CARE MNGMENT TRAINING: CPT | Mod: GO

## 2018-01-01 PROCEDURE — 85025 COMPLETE CBC W/AUTO DIFF WBC: CPT | Performed by: NURSE PRACTITIONER

## 2018-01-01 PROCEDURE — 99207 ZZC NON-BILLABLE SERV PER CHARTING: CPT | Performed by: HOSPITALIST

## 2018-01-01 PROCEDURE — 84484 ASSAY OF TROPONIN QUANT: CPT | Performed by: INTERNAL MEDICINE

## 2018-01-01 PROCEDURE — 25000128 H RX IP 250 OP 636: Mod: EC | Performed by: INTERNAL MEDICINE

## 2018-01-01 PROCEDURE — 86334 IMMUNOFIX E-PHORESIS SERUM: CPT | Performed by: INTERNAL MEDICINE

## 2018-01-01 PROCEDURE — 99397 PER PM REEVAL EST PAT 65+ YR: CPT | Performed by: FAMILY MEDICINE

## 2018-01-01 PROCEDURE — 51798 US URINE CAPACITY MEASURE: CPT

## 2018-01-01 PROCEDURE — 40000275 ZZH STATISTIC RCP TIME EA 10 MIN

## 2018-01-01 PROCEDURE — 40000556 ZZH STATISTIC PERIPHERAL IV START W US GUIDANCE

## 2018-01-01 PROCEDURE — 85025 COMPLETE CBC W/AUTO DIFF WBC: CPT | Performed by: EMERGENCY MEDICINE

## 2018-01-01 PROCEDURE — 97165 OT EVAL LOW COMPLEX 30 MIN: CPT | Mod: GO

## 2018-01-01 PROCEDURE — 99232 SBSQ HOSP IP/OBS MODERATE 35: CPT | Performed by: HOSPITALIST

## 2018-01-01 PROCEDURE — 99495 TRANSJ CARE MGMT MOD F2F 14D: CPT | Performed by: FAMILY MEDICINE

## 2018-01-01 PROCEDURE — 92610 EVALUATE SWALLOWING FUNCTION: CPT | Mod: GN | Performed by: SPEECH-LANGUAGE PATHOLOGIST

## 2018-01-01 PROCEDURE — 86900 BLOOD TYPING SEROLOGIC ABO: CPT | Performed by: EMERGENCY MEDICINE

## 2018-01-01 PROCEDURE — 21000001 ZZH R&B HEART CARE

## 2018-01-01 PROCEDURE — 84100 ASSAY OF PHOSPHORUS: CPT | Performed by: EMERGENCY MEDICINE

## 2018-01-01 PROCEDURE — 96367 TX/PROPH/DG ADDL SEQ IV INF: CPT

## 2018-01-01 PROCEDURE — 85018 HEMOGLOBIN: CPT | Performed by: INTERNAL MEDICINE

## 2018-01-01 PROCEDURE — 93306 TTE W/DOPPLER COMPLETE: CPT | Mod: 26 | Performed by: INTERNAL MEDICINE

## 2018-01-01 PROCEDURE — 82043 UR ALBUMIN QUANTITATIVE: CPT | Performed by: FAMILY MEDICINE

## 2018-01-01 PROCEDURE — 99233 SBSQ HOSP IP/OBS HIGH 50: CPT | Performed by: NURSE PRACTITIONER

## 2018-01-01 PROCEDURE — 80051 ELECTROLYTE PANEL: CPT | Performed by: FAMILY MEDICINE

## 2018-01-01 PROCEDURE — 84132 ASSAY OF SERUM POTASSIUM: CPT | Performed by: FAMILY MEDICINE

## 2018-01-01 PROCEDURE — 93321 DOPPLER ECHO F-UP/LMTD STD: CPT | Mod: 26 | Performed by: INTERNAL MEDICINE

## 2018-01-01 PROCEDURE — 96372 THER/PROPH/DIAG INJ SC/IM: CPT | Mod: 59

## 2018-01-01 PROCEDURE — 93010 ELECTROCARDIOGRAM REPORT: CPT | Performed by: INTERNAL MEDICINE

## 2018-01-01 PROCEDURE — 97116 GAIT TRAINING THERAPY: CPT | Mod: GP

## 2018-01-01 PROCEDURE — 97116 GAIT TRAINING THERAPY: CPT | Mod: GP | Performed by: PHYSICAL THERAPIST

## 2018-01-01 PROCEDURE — 40000225 ZZH STATISTIC SLP WARD VISIT: Performed by: SPEECH-LANGUAGE PATHOLOGIST

## 2018-01-01 PROCEDURE — 87086 URINE CULTURE/COLONY COUNT: CPT | Performed by: INTERNAL MEDICINE

## 2018-01-01 PROCEDURE — 25000125 ZZHC RX 250: Performed by: EMERGENCY MEDICINE

## 2018-01-01 PROCEDURE — 85610 PROTHROMBIN TIME: CPT | Performed by: EMERGENCY MEDICINE

## 2018-01-01 PROCEDURE — 72125 CT NECK SPINE W/O DYE: CPT

## 2018-01-01 PROCEDURE — 87088 URINE BACTERIA CULTURE: CPT | Performed by: EMERGENCY MEDICINE

## 2018-01-01 PROCEDURE — 36569 INSJ PICC 5 YR+ W/O IMAGING: CPT

## 2018-01-01 PROCEDURE — 82248 BILIRUBIN DIRECT: CPT | Performed by: INTERNAL MEDICINE

## 2018-01-01 PROCEDURE — 40000193 ZZH STATISTIC PT WARD VISIT: Performed by: PHYSICAL THERAPIST

## 2018-01-01 PROCEDURE — 96372 THER/PROPH/DIAG INJ SC/IM: CPT | Mod: XS

## 2018-01-01 PROCEDURE — 97166 OT EVAL MOD COMPLEX 45 MIN: CPT | Mod: GO | Performed by: OCCUPATIONAL THERAPIST

## 2018-01-01 PROCEDURE — 93325 DOPPLER ECHO COLOR FLOW MAPG: CPT | Mod: 26 | Performed by: INTERNAL MEDICINE

## 2018-01-01 PROCEDURE — 99316 NF DSCHRG MGMT 30 MIN+: CPT | Performed by: NURSE PRACTITIONER

## 2018-01-01 PROCEDURE — 84100 ASSAY OF PHOSPHORUS: CPT | Performed by: INTERNAL MEDICINE

## 2018-01-01 PROCEDURE — 99285 EMERGENCY DEPT VISIT HI MDM: CPT

## 2018-01-01 PROCEDURE — 86901 BLOOD TYPING SEROLOGIC RH(D): CPT | Performed by: EMERGENCY MEDICINE

## 2018-01-01 PROCEDURE — 99309 SBSQ NF CARE MODERATE MDM 30: CPT | Performed by: NURSE PRACTITIONER

## 2018-01-01 PROCEDURE — 84443 ASSAY THYROID STIM HORMONE: CPT | Performed by: FAMILY MEDICINE

## 2018-01-01 PROCEDURE — 85027 COMPLETE CBC AUTOMATED: CPT | Performed by: INTERNAL MEDICINE

## 2018-01-01 PROCEDURE — 25000128 H RX IP 250 OP 636: Performed by: EMERGENCY MEDICINE

## 2018-01-01 PROCEDURE — 85048 AUTOMATED LEUKOCYTE COUNT: CPT | Performed by: INTERNAL MEDICINE

## 2018-01-01 PROCEDURE — A9270 NON-COVERED ITEM OR SERVICE: HCPCS | Mod: GY | Performed by: HOSPITALIST

## 2018-01-01 PROCEDURE — 80048 BASIC METABOLIC PNL TOTAL CA: CPT | Performed by: EMERGENCY MEDICINE

## 2018-01-01 PROCEDURE — 85027 COMPLETE CBC AUTOMATED: CPT | Performed by: NURSE PRACTITIONER

## 2018-01-01 PROCEDURE — 87040 BLOOD CULTURE FOR BACTERIA: CPT | Performed by: EMERGENCY MEDICINE

## 2018-01-01 PROCEDURE — 97530 THERAPEUTIC ACTIVITIES: CPT | Mod: GP

## 2018-01-01 PROCEDURE — 25000131 ZZH RX MED GY IP 250 OP 636 PS 637: Mod: GY | Performed by: HOSPITALIST

## 2018-01-01 PROCEDURE — 40000133 ZZH STATISTIC OT WARD VISIT: Performed by: OCCUPATIONAL THERAPY ASSISTANT

## 2018-01-01 PROCEDURE — 83605 ASSAY OF LACTIC ACID: CPT | Performed by: EMERGENCY MEDICINE

## 2018-01-01 PROCEDURE — 87493 C DIFF AMPLIFIED PROBE: CPT | Performed by: NURSE PRACTITIONER

## 2018-01-01 PROCEDURE — 40000193 ZZH STATISTIC PT WARD VISIT: Performed by: PHYSICAL THERAPY ASSISTANT

## 2018-01-01 PROCEDURE — 99232 SBSQ HOSP IP/OBS MODERATE 35: CPT | Performed by: NURSE PRACTITIONER

## 2018-01-01 PROCEDURE — 86850 RBC ANTIBODY SCREEN: CPT | Performed by: EMERGENCY MEDICINE

## 2018-01-01 PROCEDURE — 40000193 ZZH STATISTIC PT WARD VISIT

## 2018-01-01 PROCEDURE — 87186 SC STD MICRODIL/AGAR DIL: CPT | Performed by: EMERGENCY MEDICINE

## 2018-01-01 PROCEDURE — 97535 SELF CARE MNGMENT TRAINING: CPT | Mod: GO | Performed by: OCCUPATIONAL THERAPIST

## 2018-01-01 PROCEDURE — 97162 PT EVAL MOD COMPLEX 30 MIN: CPT | Mod: GP

## 2018-01-01 PROCEDURE — 99207 ZZC CDG-MDM COMPONENT: MEETS LOW - DOWN CODED: CPT | Performed by: INTERNAL MEDICINE

## 2018-01-01 PROCEDURE — P9016 RBC LEUKOCYTES REDUCED: HCPCS | Performed by: INTERNAL MEDICINE

## 2018-01-01 PROCEDURE — 85025 COMPLETE CBC W/AUTO DIFF WBC: CPT | Performed by: FAMILY MEDICINE

## 2018-01-01 PROCEDURE — 85730 THROMBOPLASTIN TIME PARTIAL: CPT | Performed by: EMERGENCY MEDICINE

## 2018-01-01 PROCEDURE — 81001 URINALYSIS AUTO W/SCOPE: CPT | Performed by: INTERNAL MEDICINE

## 2018-01-01 PROCEDURE — 83880 ASSAY OF NATRIURETIC PEPTIDE: CPT | Performed by: INTERNAL MEDICINE

## 2018-01-01 PROCEDURE — 97530 THERAPEUTIC ACTIVITIES: CPT | Mod: GO | Performed by: OCCUPATIONAL THERAPY ASSISTANT

## 2018-01-01 PROCEDURE — 84450 TRANSFERASE (AST) (SGOT): CPT | Performed by: INTERNAL MEDICINE

## 2018-01-01 PROCEDURE — 99231 SBSQ HOSP IP/OBS SF/LOW 25: CPT | Performed by: INTERNAL MEDICINE

## 2018-01-01 PROCEDURE — 87086 URINE CULTURE/COLONY COUNT: CPT | Performed by: FAMILY MEDICINE

## 2018-01-01 PROCEDURE — 80076 HEPATIC FUNCTION PANEL: CPT | Performed by: INTERNAL MEDICINE

## 2018-01-01 PROCEDURE — 36430 TRANSFUSION BLD/BLD COMPNT: CPT

## 2018-01-01 PROCEDURE — 99223 1ST HOSP IP/OBS HIGH 75: CPT | Performed by: NURSE PRACTITIONER

## 2018-01-01 PROCEDURE — 25000131 ZZH RX MED GY IP 250 OP 636 PS 637: Mod: GY | Performed by: NURSE PRACTITIONER

## 2018-01-01 PROCEDURE — 99239 HOSP IP/OBS DSCHRG MGMT >30: CPT | Performed by: HOSPITALIST

## 2018-01-01 PROCEDURE — 96376 TX/PRO/DX INJ SAME DRUG ADON: CPT

## 2018-01-01 PROCEDURE — 82565 ASSAY OF CREATININE: CPT | Performed by: INTERNAL MEDICINE

## 2018-01-01 PROCEDURE — 25000128 H RX IP 250 OP 636: Performed by: HOSPITALIST

## 2018-01-01 PROCEDURE — 99207 ZZC CDG-MDM COMPONENT: MEETS MODERATE - UP CODED: CPT | Performed by: INTERNAL MEDICINE

## 2018-01-01 PROCEDURE — 93321 DOPPLER ECHO F-UP/LMTD STD: CPT

## 2018-01-01 PROCEDURE — 36415 COLL VENOUS BLD VENIPUNCTURE: CPT | Performed by: NURSE PRACTITIONER

## 2018-01-01 PROCEDURE — 83735 ASSAY OF MAGNESIUM: CPT | Performed by: INTERNAL MEDICINE

## 2018-01-01 PROCEDURE — 96360 HYDRATION IV INFUSION INIT: CPT

## 2018-01-01 PROCEDURE — 97110 THERAPEUTIC EXERCISES: CPT | Mod: GP | Performed by: PHYSICAL THERAPIST

## 2018-01-01 PROCEDURE — 93308 TTE F-UP OR LMTD: CPT | Mod: 26 | Performed by: INTERNAL MEDICINE

## 2018-01-01 PROCEDURE — 85004 AUTOMATED DIFF WBC COUNT: CPT | Performed by: INTERNAL MEDICINE

## 2018-01-01 PROCEDURE — 70450 CT HEAD/BRAIN W/O DYE: CPT

## 2018-01-01 PROCEDURE — 80053 COMPREHEN METABOLIC PANEL: CPT | Performed by: EMERGENCY MEDICINE

## 2018-01-01 PROCEDURE — 25000132 ZZH RX MED GY IP 250 OP 250 PS 637: Mod: GY | Performed by: EMERGENCY MEDICINE

## 2018-01-01 PROCEDURE — 88112 CYTOPATH CELL ENHANCE TECH: CPT | Mod: 26 | Performed by: PHYSICIAN ASSISTANT

## 2018-01-01 PROCEDURE — 84439 ASSAY OF FREE THYROXINE: CPT | Performed by: FAMILY MEDICINE

## 2018-01-01 PROCEDURE — 85379 FIBRIN DEGRADATION QUANT: CPT | Performed by: INTERNAL MEDICINE

## 2018-01-01 RX ORDER — HALOPERIDOL 2 MG/ML
1-2 SOLUTION ORAL EVERY 6 HOURS PRN
Status: DISCONTINUED | OUTPATIENT
Start: 2018-01-01 | End: 2018-01-01 | Stop reason: HOSPADM

## 2018-01-01 RX ORDER — EPINEPHRINE 0.3 MG/.3ML
0.3 INJECTION SUBCUTANEOUS EVERY 5 MIN PRN
Status: CANCELLED | OUTPATIENT
Start: 2018-01-01

## 2018-01-01 RX ORDER — METOPROLOL SUCCINATE 50 MG/1
50 TABLET, EXTENDED RELEASE ORAL DAILY
Status: DISCONTINUED | OUTPATIENT
Start: 2018-01-01 | End: 2018-01-01

## 2018-01-01 RX ORDER — ATROPINE SULFATE 10 MG/ML
2 SOLUTION/ DROPS OPHTHALMIC
Qty: 5 ML | Refills: 1 | Status: SHIPPED | OUTPATIENT
Start: 2018-01-01

## 2018-01-01 RX ORDER — NITROGLYCERIN 0.4 MG/1
0.4 TABLET SUBLINGUAL EVERY 5 MIN PRN
Status: DISCONTINUED | OUTPATIENT
Start: 2018-01-01 | End: 2018-01-01 | Stop reason: HOSPADM

## 2018-01-01 RX ORDER — POLYETHYLENE GLYCOL 3350 17 G/17G
17 POWDER, FOR SOLUTION ORAL 2 TIMES DAILY PRN
Qty: 7 PACKET | DISCHARGE
Start: 2018-01-01

## 2018-01-01 RX ORDER — ACETAMINOPHEN 325 MG/1
650 TABLET ORAL EVERY 4 HOURS PRN
Qty: 100 TABLET | Refills: 1 | Status: SHIPPED | OUTPATIENT
Start: 2018-01-01

## 2018-01-01 RX ORDER — LEVOTHYROXINE SODIUM 25 UG/1
25 TABLET ORAL
Status: DISCONTINUED | OUTPATIENT
Start: 2018-01-01 | End: 2018-01-01 | Stop reason: HOSPADM

## 2018-01-01 RX ORDER — AMLODIPINE BESYLATE 10 MG/1
10 TABLET ORAL DAILY
Status: DISCONTINUED | OUTPATIENT
Start: 2018-01-01 | End: 2018-01-01 | Stop reason: HOSPADM

## 2018-01-01 RX ORDER — ACETAMINOPHEN 325 MG/1
650 TABLET ORAL ONCE
Status: CANCELLED | OUTPATIENT
Start: 2018-01-01

## 2018-01-01 RX ORDER — DIPHENHYDRAMINE HYDROCHLORIDE 50 MG/ML
50 INJECTION INTRAMUSCULAR; INTRAVENOUS
Status: CANCELLED
Start: 2018-01-01

## 2018-01-01 RX ORDER — PROCHLORPERAZINE MALEATE 10 MG
10 TABLET ORAL EVERY 6 HOURS PRN
Qty: 30 TABLET | Refills: 5 | COMMUNITY
Start: 2018-01-01 | End: 2018-01-01

## 2018-01-01 RX ORDER — ALBUTEROL SULFATE 90 UG/1
1-2 AEROSOL, METERED RESPIRATORY (INHALATION)
Status: CANCELLED
Start: 2018-01-01

## 2018-01-01 RX ORDER — METHYLPREDNISOLONE SODIUM SUCCINATE 125 MG/2ML
125 INJECTION, POWDER, LYOPHILIZED, FOR SOLUTION INTRAMUSCULAR; INTRAVENOUS
Status: CANCELLED
Start: 2018-01-01

## 2018-01-01 RX ORDER — PROCHLORPERAZINE 25 MG
12.5 SUPPOSITORY, RECTAL RECTAL EVERY 12 HOURS PRN
Status: DISCONTINUED | OUTPATIENT
Start: 2018-01-01 | End: 2018-01-01 | Stop reason: HOSPADM

## 2018-01-01 RX ORDER — SODIUM CHLORIDE 9 MG/ML
1000 INJECTION, SOLUTION INTRAVENOUS CONTINUOUS PRN
Status: CANCELLED
Start: 2018-01-01

## 2018-01-01 RX ORDER — ALBUTEROL SULFATE 0.83 MG/ML
2.5 SOLUTION RESPIRATORY (INHALATION)
Status: CANCELLED | OUTPATIENT
Start: 2018-01-01

## 2018-01-01 RX ORDER — ACYCLOVIR 200 MG/1
400 CAPSULE ORAL 2 TIMES DAILY
Status: DISCONTINUED | OUTPATIENT
Start: 2018-01-01 | End: 2018-01-01 | Stop reason: HOSPADM

## 2018-01-01 RX ORDER — DIPHENHYDRAMINE HCL 25 MG
50 CAPSULE ORAL ONCE
Status: CANCELLED | OUTPATIENT
Start: 2018-01-01

## 2018-01-01 RX ORDER — EPINEPHRINE 1 MG/ML
0.3 INJECTION, SOLUTION, CONCENTRATE INTRAVENOUS EVERY 5 MIN PRN
Status: CANCELLED | OUTPATIENT
Start: 2018-01-01

## 2018-01-01 RX ORDER — LORAZEPAM 2 MG/ML
0.5 INJECTION INTRAMUSCULAR EVERY 4 HOURS PRN
Status: CANCELLED
Start: 2018-01-01

## 2018-01-01 RX ORDER — LORAZEPAM 0.5 MG/1
0.5 TABLET ORAL EVERY 4 HOURS PRN
Qty: 4 TABLET | Refills: 5 | Status: SHIPPED | DISCHARGE
Start: 2018-01-01 | End: 2018-01-01

## 2018-01-01 RX ORDER — MEPERIDINE HYDROCHLORIDE 25 MG/ML
25 INJECTION INTRAMUSCULAR; INTRAVENOUS; SUBCUTANEOUS EVERY 30 MIN PRN
Status: CANCELLED | OUTPATIENT
Start: 2018-01-01

## 2018-01-01 RX ORDER — PIPERACILLIN SODIUM, TAZOBACTAM SODIUM 4; .5 G/20ML; G/20ML
4.5 INJECTION, POWDER, LYOPHILIZED, FOR SOLUTION INTRAVENOUS ONCE
Status: DISCONTINUED | OUTPATIENT
Start: 2018-01-01 | End: 2018-01-01

## 2018-01-01 RX ORDER — NICOTINE POLACRILEX 4 MG
15-30 LOZENGE BUCCAL
Status: DISCONTINUED | OUTPATIENT
Start: 2018-01-01 | End: 2018-01-01 | Stop reason: HOSPADM

## 2018-01-01 RX ORDER — AMLODIPINE BESYLATE 5 MG/1
5 TABLET ORAL ONCE
Status: COMPLETED | OUTPATIENT
Start: 2018-01-01 | End: 2018-01-01

## 2018-01-01 RX ORDER — ONDANSETRON 4 MG/1
4 TABLET, ORALLY DISINTEGRATING ORAL EVERY 6 HOURS PRN
Status: DISCONTINUED | OUTPATIENT
Start: 2018-01-01 | End: 2018-01-01 | Stop reason: HOSPADM

## 2018-01-01 RX ORDER — PROCHLORPERAZINE MALEATE 5 MG
5 TABLET ORAL EVERY 6 HOURS PRN
Status: DISCONTINUED | OUTPATIENT
Start: 2018-01-01 | End: 2018-01-01 | Stop reason: HOSPADM

## 2018-01-01 RX ORDER — METOPROLOL SUCCINATE 25 MG/1
25 TABLET, EXTENDED RELEASE ORAL ONCE
Status: COMPLETED | OUTPATIENT
Start: 2018-01-01 | End: 2018-01-01

## 2018-01-01 RX ORDER — FERROUS SULFATE 325(65) MG
325 TABLET ORAL
Status: DISCONTINUED | OUTPATIENT
Start: 2018-01-01 | End: 2018-01-01 | Stop reason: HOSPADM

## 2018-01-01 RX ORDER — TAMSULOSIN HYDROCHLORIDE 0.4 MG/1
0.4 CAPSULE ORAL AT BEDTIME
Status: DISCONTINUED | OUTPATIENT
Start: 2018-01-01 | End: 2018-01-01

## 2018-01-01 RX ORDER — AMOXICILLIN 250 MG
2 CAPSULE ORAL 2 TIMES DAILY PRN
Status: DISCONTINUED | OUTPATIENT
Start: 2018-01-01 | End: 2018-01-01 | Stop reason: HOSPADM

## 2018-01-01 RX ORDER — ACYCLOVIR 400 MG/1
400 TABLET ORAL 2 TIMES DAILY
Status: DISCONTINUED | OUTPATIENT
Start: 2018-01-01 | End: 2018-01-01 | Stop reason: HOSPADM

## 2018-01-01 RX ORDER — GEMFIBROZIL 600 MG/1
600 TABLET, FILM COATED ORAL 2 TIMES DAILY
Status: DISCONTINUED | OUTPATIENT
Start: 2018-01-01 | End: 2018-01-01 | Stop reason: HOSPADM

## 2018-01-01 RX ORDER — METOPROLOL SUCCINATE 50 MG/1
50 TABLET, EXTENDED RELEASE ORAL DAILY
Qty: 30 TABLET | Status: ON HOLD | DISCHARGE
Start: 2018-01-01 | End: 2018-01-01

## 2018-01-01 RX ORDER — GEMFIBROZIL 600 MG/1
600 TABLET, FILM COATED ORAL 2 TIMES DAILY
Status: DISCONTINUED | OUTPATIENT
Start: 2018-01-01 | End: 2018-01-01

## 2018-01-01 RX ORDER — EPINEPHRINE 1 MG/ML
0.3 INJECTION, SOLUTION INTRAMUSCULAR; SUBCUTANEOUS EVERY 5 MIN PRN
Status: CANCELLED | OUTPATIENT
Start: 2018-01-01

## 2018-01-01 RX ORDER — MEROPENEM 1 G/1
1 INJECTION, POWDER, FOR SOLUTION INTRAVENOUS EVERY 12 HOURS
Status: DISCONTINUED | OUTPATIENT
Start: 2018-01-01 | End: 2018-01-01

## 2018-01-01 RX ORDER — HALOPERIDOL 2 MG/ML
1 SOLUTION ORAL EVERY 6 HOURS PRN
Qty: 30 ML | Refills: 1 | Status: SHIPPED | OUTPATIENT
Start: 2018-01-01

## 2018-01-01 RX ORDER — AMLODIPINE BESYLATE 5 MG/1
5 TABLET ORAL DAILY
Status: DISCONTINUED | OUTPATIENT
Start: 2018-01-01 | End: 2018-01-01

## 2018-01-01 RX ORDER — ONDANSETRON 2 MG/ML
4 INJECTION INTRAMUSCULAR; INTRAVENOUS EVERY 6 HOURS PRN
Status: DISCONTINUED | OUTPATIENT
Start: 2018-01-01 | End: 2018-01-01 | Stop reason: HOSPADM

## 2018-01-01 RX ORDER — DEXAMETHASONE 4 MG/1
20 TABLET ORAL SEE ADMIN INSTRUCTIONS
COMMUNITY
End: 2018-01-01

## 2018-01-01 RX ORDER — ACETAMINOPHEN 650 MG/1
650 SUPPOSITORY RECTAL EVERY 4 HOURS PRN
Status: DISCONTINUED | OUTPATIENT
Start: 2018-01-01 | End: 2018-01-01 | Stop reason: HOSPADM

## 2018-01-01 RX ORDER — ERTAPENEM 1 G/1
1 INJECTION, POWDER, LYOPHILIZED, FOR SOLUTION INTRAMUSCULAR; INTRAVENOUS EVERY 24 HOURS
Status: DISCONTINUED | OUTPATIENT
Start: 2018-01-01 | End: 2018-01-01

## 2018-01-01 RX ORDER — AMLODIPINE BESYLATE 10 MG/1
10 TABLET ORAL DAILY
Status: DISCONTINUED | OUTPATIENT
Start: 2018-01-01 | End: 2018-01-01

## 2018-01-01 RX ORDER — SODIUM CHLORIDE 9 MG/ML
INJECTION, SOLUTION INTRAVENOUS CONTINUOUS
Status: DISCONTINUED | OUTPATIENT
Start: 2018-01-01 | End: 2018-01-01

## 2018-01-01 RX ORDER — LEVALBUTEROL INHALATION SOLUTION 0.31 MG/3ML
1.25 SOLUTION RESPIRATORY (INHALATION) EVERY 6 HOURS PRN
Status: DISCONTINUED | OUTPATIENT
Start: 2018-01-01 | End: 2018-01-01

## 2018-01-01 RX ORDER — LEVOTHYROXINE SODIUM 25 UG/1
25 TABLET ORAL DAILY
Status: DISCONTINUED | OUTPATIENT
Start: 2018-01-01 | End: 2018-01-01 | Stop reason: HOSPADM

## 2018-01-01 RX ORDER — HEPARIN SODIUM (PORCINE) LOCK FLUSH IV SOLN 100 UNIT/ML 100 UNIT/ML
SOLUTION INTRAVENOUS EVERY 8 HOURS
COMMUNITY
End: 2018-01-01

## 2018-01-01 RX ORDER — METOPROLOL SUCCINATE 50 MG/1
50 TABLET, EXTENDED RELEASE ORAL DAILY
Status: DISCONTINUED | OUTPATIENT
Start: 2018-01-01 | End: 2018-01-01 | Stop reason: HOSPADM

## 2018-01-01 RX ORDER — HEPARIN SODIUM (PORCINE) LOCK FLUSH IV SOLN 100 UNIT/ML 100 UNIT/ML
5 SOLUTION INTRAVENOUS EVERY 8 HOURS
Status: DISCONTINUED | OUTPATIENT
Start: 2018-01-01 | End: 2018-01-01 | Stop reason: HOSPADM

## 2018-01-01 RX ORDER — GLYCOPYRROLATE 0.2 MG/ML
.2-.4 INJECTION, SOLUTION INTRAMUSCULAR; INTRAVENOUS EVERY 4 HOURS PRN
Status: DISCONTINUED | OUTPATIENT
Start: 2018-01-01 | End: 2018-01-01 | Stop reason: HOSPADM

## 2018-01-01 RX ORDER — LORAZEPAM 0.5 MG/1
0.5 TABLET ORAL EVERY 4 HOURS PRN
Qty: 30 TABLET | Refills: 2 | Status: ON HOLD | OUTPATIENT
Start: 2018-01-01 | End: 2018-01-01

## 2018-01-01 RX ORDER — NALOXONE HYDROCHLORIDE 0.4 MG/ML
.1-.4 INJECTION, SOLUTION INTRAMUSCULAR; INTRAVENOUS; SUBCUTANEOUS
Status: DISCONTINUED | OUTPATIENT
Start: 2018-01-01 | End: 2018-01-01 | Stop reason: HOSPADM

## 2018-01-01 RX ORDER — POTASSIUM CHLORIDE 750 MG/1
10 TABLET, EXTENDED RELEASE ORAL 2 TIMES DAILY
COMMUNITY
Start: 2018-01-01 | End: 2018-01-01

## 2018-01-01 RX ORDER — BISACODYL 10 MG
10 SUPPOSITORY, RECTAL RECTAL DAILY PRN
Qty: 30 SUPPOSITORY | DISCHARGE
Start: 2018-01-01 | End: 2018-01-01

## 2018-01-01 RX ORDER — GUAIFENESIN/DEXTROMETHORPHAN 100-10MG/5
5 SYRUP ORAL EVERY 6 HOURS PRN
Status: DISCONTINUED | OUTPATIENT
Start: 2018-01-01 | End: 2018-01-01 | Stop reason: HOSPADM

## 2018-01-01 RX ORDER — METHYLPREDNISOLONE SODIUM SUCCINATE 125 MG/2ML
125 INJECTION, POWDER, LYOPHILIZED, FOR SOLUTION INTRAMUSCULAR; INTRAVENOUS
Status: DISCONTINUED | OUTPATIENT
Start: 2018-01-01 | End: 2018-01-01 | Stop reason: HOSPADM

## 2018-01-01 RX ORDER — AMOXICILLIN 250 MG
1 CAPSULE ORAL 2 TIMES DAILY PRN
Status: DISCONTINUED | OUTPATIENT
Start: 2018-01-01 | End: 2018-01-01 | Stop reason: HOSPADM

## 2018-01-01 RX ORDER — ALBUTEROL SULFATE 90 UG/1
1-2 AEROSOL, METERED RESPIRATORY (INHALATION)
Status: DISCONTINUED | OUTPATIENT
Start: 2018-01-01 | End: 2018-01-01 | Stop reason: HOSPADM

## 2018-01-01 RX ORDER — WATER 10 ML/10ML
INJECTION INTRAMUSCULAR; INTRAVENOUS; SUBCUTANEOUS
Status: COMPLETED
Start: 2018-01-01 | End: 2018-01-01

## 2018-01-01 RX ORDER — METHOCARBAMOL 750 MG/1
750 TABLET, FILM COATED ORAL 3 TIMES DAILY PRN
Qty: 30 TABLET | Refills: 0 | Status: SHIPPED | OUTPATIENT
Start: 2018-01-01 | End: 2018-01-01

## 2018-01-01 RX ORDER — DOCUSATE SODIUM 100 MG/1
100 CAPSULE, LIQUID FILLED ORAL 2 TIMES DAILY PRN
Status: DISCONTINUED | OUTPATIENT
Start: 2018-01-01 | End: 2018-01-01 | Stop reason: HOSPADM

## 2018-01-01 RX ORDER — FUROSEMIDE 20 MG
20 TABLET ORAL DAILY
Status: DISCONTINUED | OUTPATIENT
Start: 2018-01-01 | End: 2018-01-01 | Stop reason: HOSPADM

## 2018-01-01 RX ORDER — FUROSEMIDE 20 MG
TABLET ORAL
Qty: 30 TABLET | Refills: 11 | Status: SHIPPED | OUTPATIENT
Start: 2018-01-01

## 2018-01-01 RX ORDER — SODIUM CHLORIDE 9 MG/ML
1000 INJECTION, SOLUTION INTRAVENOUS CONTINUOUS
Status: DISCONTINUED | OUTPATIENT
Start: 2018-01-01 | End: 2018-01-01

## 2018-01-01 RX ORDER — DOCUSATE SODIUM 100 MG/1
100 CAPSULE, LIQUID FILLED ORAL 2 TIMES DAILY PRN
Qty: 60 CAPSULE | DISCHARGE
Start: 2018-01-01 | End: 2018-01-01

## 2018-01-01 RX ORDER — ACETAMINOPHEN 325 MG/1
650 TABLET ORAL EVERY 4 HOURS PRN
Status: DISCONTINUED | OUTPATIENT
Start: 2018-01-01 | End: 2018-01-01 | Stop reason: HOSPADM

## 2018-01-01 RX ORDER — BISACODYL 10 MG
10 SUPPOSITORY, RECTAL RECTAL
Status: DISCONTINUED | OUTPATIENT
Start: 2018-06-01 | End: 2018-01-01 | Stop reason: HOSPADM

## 2018-01-01 RX ORDER — FUROSEMIDE 20 MG
20 TABLET ORAL DAILY
Qty: 90 TABLET | Refills: 1 | Status: SHIPPED | OUTPATIENT
Start: 2018-01-01 | End: 2018-01-01

## 2018-01-01 RX ORDER — LIDOCAINE 40 MG/G
CREAM TOPICAL
Status: DISCONTINUED | OUTPATIENT
Start: 2018-01-01 | End: 2018-01-01 | Stop reason: HOSPADM

## 2018-01-01 RX ORDER — ACYCLOVIR 400 MG/1
400 TABLET ORAL 2 TIMES DAILY
Qty: 60 TABLET | Refills: 11 | Status: SHIPPED | OUTPATIENT
Start: 2018-01-01 | End: 2018-01-01

## 2018-01-01 RX ORDER — EPINEPHRINE 1 MG/ML
0.3 INJECTION, SOLUTION INTRAMUSCULAR; SUBCUTANEOUS EVERY 5 MIN PRN
Status: DISCONTINUED | OUTPATIENT
Start: 2018-01-01 | End: 2018-01-01 | Stop reason: HOSPADM

## 2018-01-01 RX ORDER — FERROUS SULFATE 325(65) MG
325 TABLET ORAL
Status: DISCONTINUED | OUTPATIENT
Start: 2018-01-01 | End: 2018-01-01

## 2018-01-01 RX ORDER — INSULIN ASPART 100 [IU]/ML
INJECTION, SOLUTION INTRAVENOUS; SUBCUTANEOUS
Qty: 15 ML | Refills: 1 | Status: ON HOLD | OUTPATIENT
Start: 2018-01-01 | End: 2018-01-01

## 2018-01-01 RX ORDER — DEXTROSE MONOHYDRATE 25 G/50ML
25-50 INJECTION, SOLUTION INTRAVENOUS
Status: DISCONTINUED | OUTPATIENT
Start: 2018-01-01 | End: 2018-01-01

## 2018-01-01 RX ORDER — DEXAMETHASONE 4 MG/1
TABLET ORAL
Qty: 40 TABLET | Refills: 0 | Status: SHIPPED | OUTPATIENT
Start: 2018-01-01 | End: 2018-01-01

## 2018-01-01 RX ORDER — NYSTATIN 100000/ML
500000 SUSPENSION, ORAL (FINAL DOSE FORM) ORAL 4 TIMES DAILY
COMMUNITY
Start: 2018-01-01 | End: 2018-01-01

## 2018-01-01 RX ORDER — POTASSIUM CHLORIDE 1.5 G/1.58G
20-40 POWDER, FOR SOLUTION ORAL
Status: DISCONTINUED | OUTPATIENT
Start: 2018-01-01 | End: 2018-01-01 | Stop reason: HOSPADM

## 2018-01-01 RX ORDER — TAMSULOSIN HYDROCHLORIDE 0.4 MG/1
0.4 CAPSULE ORAL DAILY
Status: DISCONTINUED | OUTPATIENT
Start: 2018-01-01 | End: 2018-01-01 | Stop reason: HOSPADM

## 2018-01-01 RX ORDER — SODIUM CHLORIDE 9 MG/ML
1000 INJECTION, SOLUTION INTRAVENOUS CONTINUOUS PRN
Status: DISCONTINUED | OUTPATIENT
Start: 2018-01-01 | End: 2018-01-01 | Stop reason: HOSPADM

## 2018-01-01 RX ORDER — LEVOFLOXACIN 500 MG/1
500 TABLET, FILM COATED ORAL EVERY 24 HOURS
Status: DISCONTINUED | OUTPATIENT
Start: 2018-01-01 | End: 2018-01-01 | Stop reason: HOSPADM

## 2018-01-01 RX ORDER — ACYCLOVIR 400 MG/1
400 TABLET ORAL 2 TIMES DAILY
Qty: 60 TABLET | Refills: 2 | Status: SHIPPED | OUTPATIENT
Start: 2018-01-01 | End: 2018-01-01

## 2018-01-01 RX ORDER — SODIUM CHLORIDE 9 MG/ML
INJECTION, SOLUTION INTRAVENOUS CONTINUOUS
Status: ACTIVE | OUTPATIENT
Start: 2018-01-01 | End: 2018-01-01

## 2018-01-01 RX ORDER — SENNOSIDES 8.6 MG
1 TABLET ORAL 2 TIMES DAILY PRN
Qty: 100 TABLET | Refills: 1 | Status: SHIPPED | OUTPATIENT
Start: 2018-01-01

## 2018-01-01 RX ORDER — ERTAPENEM 1 G/1
1 INJECTION, POWDER, LYOPHILIZED, FOR SOLUTION INTRAMUSCULAR; INTRAVENOUS ONCE
Status: COMPLETED | OUTPATIENT
Start: 2018-01-01 | End: 2018-01-01

## 2018-01-01 RX ORDER — ATROPINE SULFATE 10 MG/ML
1-2 SOLUTION/ DROPS OPHTHALMIC
Status: DISCONTINUED | OUTPATIENT
Start: 2018-01-01 | End: 2018-01-01 | Stop reason: HOSPADM

## 2018-01-01 RX ORDER — CEFTRIAXONE 1 G/1
1 INJECTION, POWDER, FOR SOLUTION INTRAMUSCULAR; INTRAVENOUS EVERY 24 HOURS
Status: DISCONTINUED | OUTPATIENT
Start: 2018-01-01 | End: 2018-01-01

## 2018-01-01 RX ORDER — PIPERACILLIN SODIUM, TAZOBACTAM SODIUM 3; .375 G/15ML; G/15ML
3.38 INJECTION, POWDER, LYOPHILIZED, FOR SOLUTION INTRAVENOUS ONCE
Status: COMPLETED | OUTPATIENT
Start: 2018-01-01 | End: 2018-01-01

## 2018-01-01 RX ORDER — PROCHLORPERAZINE MALEATE 5 MG
5 TABLET ORAL EVERY 6 HOURS PRN
Qty: 30 TABLET | Refills: 1 | Status: ON HOLD | OUTPATIENT
Start: 2018-01-01 | End: 2018-01-01

## 2018-01-01 RX ORDER — BLOOD-GLUCOSE METER
KIT MISCELLANEOUS
Qty: 400 STRIP | Refills: 0 | Status: SHIPPED | OUTPATIENT
Start: 2018-01-01 | End: 2018-01-01

## 2018-01-01 RX ORDER — METOPROLOL TARTRATE 25 MG/1
TABLET, FILM COATED ORAL
Qty: 180 TABLET | Refills: 0 | Status: ON HOLD | OUTPATIENT
Start: 2018-01-01 | End: 2018-01-01

## 2018-01-01 RX ORDER — DEXAMETHASONE 4 MG/1
40 TABLET ORAL WEEKLY
Qty: 40 TABLET | Refills: 0 | Status: ON HOLD | OUTPATIENT
Start: 2018-01-01 | End: 2018-01-01

## 2018-01-01 RX ORDER — HYDROMORPHONE HYDROCHLORIDE 10 MG/ML
2-4 INJECTION INTRAMUSCULAR; INTRAVENOUS; SUBCUTANEOUS
Status: DISCONTINUED | OUTPATIENT
Start: 2018-01-01 | End: 2018-01-01 | Stop reason: HOSPADM

## 2018-01-01 RX ORDER — PROCHLORPERAZINE MALEATE 10 MG
10 TABLET ORAL EVERY 6 HOURS PRN
Qty: 30 TABLET | Refills: 2 | Status: SHIPPED | OUTPATIENT
Start: 2018-01-01 | End: 2018-01-01

## 2018-01-01 RX ORDER — TAMSULOSIN HYDROCHLORIDE 0.4 MG/1
0.4 CAPSULE ORAL AT BEDTIME
Status: ON HOLD | COMMUNITY
End: 2018-01-01

## 2018-01-01 RX ORDER — POTASSIUM CHLORIDE 1500 MG/1
20-40 TABLET, EXTENDED RELEASE ORAL
Status: DISCONTINUED | OUTPATIENT
Start: 2018-01-01 | End: 2018-01-01 | Stop reason: HOSPADM

## 2018-01-01 RX ORDER — PROCHLORPERAZINE MALEATE 5 MG
5 TABLET ORAL EVERY 6 HOURS PRN
Qty: 30 TABLET | Refills: 0 | Status: SHIPPED | OUTPATIENT
Start: 2018-01-01

## 2018-01-01 RX ORDER — DEXTROSE MONOHYDRATE 25 G/50ML
25-50 INJECTION, SOLUTION INTRAVENOUS
Status: DISCONTINUED | OUTPATIENT
Start: 2018-01-01 | End: 2018-01-01 | Stop reason: HOSPADM

## 2018-01-01 RX ORDER — LABETALOL HYDROCHLORIDE 5 MG/ML
10 INJECTION, SOLUTION INTRAVENOUS
Status: DISCONTINUED | OUTPATIENT
Start: 2018-01-01 | End: 2018-01-01 | Stop reason: HOSPADM

## 2018-01-01 RX ORDER — LORAZEPAM 0.5 MG/1
0.5 TABLET ORAL EVERY 4 HOURS PRN
Status: DISCONTINUED | OUTPATIENT
Start: 2018-01-01 | End: 2018-01-01 | Stop reason: HOSPADM

## 2018-01-01 RX ORDER — SENNOSIDES 8.6 MG
1-2 TABLET ORAL 2 TIMES DAILY
Status: DISCONTINUED | OUTPATIENT
Start: 2018-01-01 | End: 2018-01-01 | Stop reason: HOSPADM

## 2018-01-01 RX ORDER — MEPERIDINE HYDROCHLORIDE 25 MG/ML
25 INJECTION INTRAMUSCULAR; INTRAVENOUS; SUBCUTANEOUS EVERY 30 MIN PRN
Status: DISCONTINUED | OUTPATIENT
Start: 2018-01-01 | End: 2018-01-01 | Stop reason: HOSPADM

## 2018-01-01 RX ORDER — ACYCLOVIR 200 MG/1
400 CAPSULE ORAL 2 TIMES DAILY
Status: DISCONTINUED | OUTPATIENT
Start: 2018-01-01 | End: 2018-01-01

## 2018-01-01 RX ORDER — QUETIAPINE FUMARATE 25 MG/1
25 TABLET, FILM COATED ORAL 2 TIMES DAILY
Qty: 28 TABLET | Refills: 0 | Status: SHIPPED | OUTPATIENT
Start: 2018-01-01

## 2018-01-01 RX ORDER — METOPROLOL TARTRATE 25 MG/1
25 TABLET, FILM COATED ORAL 2 TIMES DAILY
Status: DISCONTINUED | OUTPATIENT
Start: 2018-01-01 | End: 2018-01-01

## 2018-01-01 RX ORDER — ASCORBIC ACID 500 MG
1000 TABLET ORAL DAILY
Status: DISCONTINUED | OUTPATIENT
Start: 2018-01-01 | End: 2018-01-01 | Stop reason: HOSPADM

## 2018-01-01 RX ORDER — PROCHLORPERAZINE MALEATE 10 MG
10 TABLET ORAL EVERY 6 HOURS PRN
Qty: 30 TABLET | Refills: 3 | Status: SHIPPED | OUTPATIENT
Start: 2018-01-01 | End: 2018-01-01

## 2018-01-01 RX ORDER — HYDRALAZINE HYDROCHLORIDE 20 MG/ML
10 INJECTION INTRAMUSCULAR; INTRAVENOUS EVERY 4 HOURS PRN
Status: DISCONTINUED | OUTPATIENT
Start: 2018-01-01 | End: 2018-01-01 | Stop reason: HOSPADM

## 2018-01-01 RX ORDER — LORAZEPAM 0.5 MG/1
0.5 TABLET ORAL EVERY 4 HOURS PRN
Qty: 30 TABLET | Refills: 3 | Status: SHIPPED | OUTPATIENT
Start: 2018-01-01 | End: 2018-01-01

## 2018-01-01 RX ORDER — LEVOFLOXACIN 5 MG/ML
500 INJECTION, SOLUTION INTRAVENOUS EVERY 24 HOURS
Status: DISCONTINUED | OUTPATIENT
Start: 2018-01-01 | End: 2018-01-01

## 2018-01-01 RX ORDER — METOPROLOL TARTRATE 50 MG
50 TABLET ORAL 2 TIMES DAILY
Status: DISCONTINUED | OUTPATIENT
Start: 2018-01-01 | End: 2018-01-01

## 2018-01-01 RX ORDER — DILTIAZEM HYDROCHLORIDE 5 MG/ML
10 INJECTION INTRAVENOUS ONCE
Status: COMPLETED | OUTPATIENT
Start: 2018-01-01 | End: 2018-01-01

## 2018-01-01 RX ORDER — ERTAPENEM 1 G/1
1 INJECTION, POWDER, LYOPHILIZED, FOR SOLUTION INTRAMUSCULAR; INTRAVENOUS EVERY 24 HOURS
Status: DISCONTINUED | OUTPATIENT
Start: 2018-01-01 | End: 2018-01-01 | Stop reason: HOSPADM

## 2018-01-01 RX ORDER — LISINOPRIL 30 MG/1
TABLET ORAL
Qty: 90 TABLET | Refills: 0 | Status: ON HOLD | OUTPATIENT
Start: 2018-01-01 | End: 2018-01-01

## 2018-01-01 RX ORDER — PIPERACILLIN SODIUM, TAZOBACTAM SODIUM 4; .5 G/20ML; G/20ML
4.5 INJECTION, POWDER, LYOPHILIZED, FOR SOLUTION INTRAVENOUS EVERY 6 HOURS
Status: DISCONTINUED | OUTPATIENT
Start: 2018-01-01 | End: 2018-01-01

## 2018-01-01 RX ORDER — BISACODYL 10 MG
SUPPOSITORY, RECTAL RECTAL
Qty: 12 SUPPOSITORY | Refills: 1 | Status: SHIPPED | OUTPATIENT
Start: 2018-01-01

## 2018-01-01 RX ORDER — DEXAMETHASONE 4 MG/1
4 TABLET ORAL
Qty: 8 TABLET | Refills: 4 | Status: ON HOLD | OUTPATIENT
Start: 2018-01-01 | End: 2018-01-01

## 2018-01-01 RX ORDER — HYDROMORPHONE HYDROCHLORIDE 1 MG/ML
2 SOLUTION ORAL
Qty: 30 ML | Refills: 0 | Status: SHIPPED | OUTPATIENT
Start: 2018-01-01

## 2018-01-01 RX ORDER — MEROPENEM AND SODIUM CHLORIDE 1 G/50ML
1 INJECTION, SOLUTION INTRAVENOUS ONCE
Status: COMPLETED | OUTPATIENT
Start: 2018-01-01 | End: 2018-01-01

## 2018-01-01 RX ORDER — NICOTINE POLACRILEX 4 MG
15-30 LOZENGE BUCCAL
Status: DISCONTINUED | OUTPATIENT
Start: 2018-01-01 | End: 2018-01-01

## 2018-01-01 RX ORDER — POTASSIUM CHLORIDE 7.45 MG/ML
10 INJECTION INTRAVENOUS
Status: DISCONTINUED | OUTPATIENT
Start: 2018-01-01 | End: 2018-01-01 | Stop reason: HOSPADM

## 2018-01-01 RX ORDER — HEPARIN SODIUM (PORCINE) LOCK FLUSH IV SOLN 100 UNIT/ML 100 UNIT/ML
5 SOLUTION INTRAVENOUS EVERY 8 HOURS
Status: CANCELLED
Start: 2018-01-01

## 2018-01-01 RX ORDER — DEXAMETHASONE 4 MG/1
TABLET ORAL
Qty: 40 TABLET | Refills: 0 | Status: ON HOLD | OUTPATIENT
Start: 2018-01-01 | End: 2018-01-01

## 2018-01-01 RX ORDER — GUAIFENESIN/DEXTROMETHORPHAN 100-10MG/5
5 SYRUP ORAL EVERY 6 HOURS PRN
Qty: 560 ML | DISCHARGE
Start: 2018-01-01 | End: 2018-01-01

## 2018-01-01 RX ORDER — POTASSIUM CHLORIDE 750 MG/1
10 TABLET, EXTENDED RELEASE ORAL DAILY
COMMUNITY
Start: 2018-01-01 | End: 2018-01-01

## 2018-01-01 RX ORDER — DEXAMETHASONE 4 MG/1
TABLET ORAL
Qty: 40 TABLET | Refills: 0 | DISCHARGE
Start: 2018-01-01 | End: 2018-01-01

## 2018-01-01 RX ORDER — AMOXICILLIN 250 MG
2 CAPSULE ORAL DAILY PRN
Status: DISCONTINUED | OUTPATIENT
Start: 2018-01-01 | End: 2018-01-01 | Stop reason: HOSPADM

## 2018-01-01 RX ORDER — LEVOTHYROXINE SODIUM 25 UG/1
TABLET ORAL
Qty: 90 TABLET | Refills: 0 | Status: ON HOLD | OUTPATIENT
Start: 2018-01-01 | End: 2018-01-01

## 2018-01-01 RX ORDER — PIPERACILLIN SODIUM, TAZOBACTAM SODIUM 3; .375 G/15ML; G/15ML
3.38 INJECTION, POWDER, LYOPHILIZED, FOR SOLUTION INTRAVENOUS EVERY 6 HOURS
Status: DISCONTINUED | OUTPATIENT
Start: 2018-01-01 | End: 2018-01-01

## 2018-01-01 RX ORDER — LENALIDOMIDE 5 MG/1
5 CAPSULE ORAL DAILY
Qty: 21 CAPSULE | Refills: 0 | Status: ON HOLD | OUTPATIENT
Start: 2018-01-01 | End: 2018-01-01

## 2018-01-01 RX ORDER — ACYCLOVIR 400 MG/1
400 TABLET ORAL 2 TIMES DAILY
Qty: 60 TABLET | Refills: 7 | Status: SHIPPED | OUTPATIENT
Start: 2018-01-01 | End: 2018-01-01

## 2018-01-01 RX ORDER — ALBUTEROL SULFATE 0.83 MG/ML
2.5 SOLUTION RESPIRATORY (INHALATION)
Status: DISCONTINUED | OUTPATIENT
Start: 2018-01-01 | End: 2018-01-01 | Stop reason: HOSPADM

## 2018-01-01 RX ORDER — POLYETHYLENE GLYCOL 3350 17 G/17G
17 POWDER, FOR SOLUTION ORAL 2 TIMES DAILY PRN
Status: DISCONTINUED | OUTPATIENT
Start: 2018-01-01 | End: 2018-01-01 | Stop reason: HOSPADM

## 2018-01-01 RX ORDER — AMLODIPINE BESYLATE 5 MG/1
5 TABLET ORAL DAILY
Status: DISCONTINUED | OUTPATIENT
Start: 2018-01-01 | End: 2018-01-01 | Stop reason: HOSPADM

## 2018-01-01 RX ORDER — POTASSIUM CL/LIDO/0.9 % NACL 10MEQ/0.1L
10 INTRAVENOUS SOLUTION, PIGGYBACK (ML) INTRAVENOUS
Status: DISCONTINUED | OUTPATIENT
Start: 2018-01-01 | End: 2018-01-01 | Stop reason: HOSPADM

## 2018-01-01 RX ORDER — METOPROLOL SUCCINATE 50 MG/1
50 TABLET, EXTENDED RELEASE ORAL DAILY
Qty: 30 TABLET | Refills: 6 | Status: SHIPPED | OUTPATIENT
Start: 2018-01-01 | End: 2018-01-01

## 2018-01-01 RX ORDER — MAGNESIUM SULFATE HEPTAHYDRATE 40 MG/ML
4 INJECTION, SOLUTION INTRAVENOUS EVERY 4 HOURS PRN
Status: DISCONTINUED | OUTPATIENT
Start: 2018-01-01 | End: 2018-01-01 | Stop reason: HOSPADM

## 2018-01-01 RX ORDER — CIPROFLOXACIN 500 MG/1
500 TABLET, FILM COATED ORAL 2 TIMES DAILY
Qty: 14 TABLET | Refills: 0 | Status: SHIPPED | OUTPATIENT
Start: 2018-01-01 | End: 2018-01-01

## 2018-01-01 RX ORDER — LORAZEPAM 0.5 MG/1
0.5 TABLET ORAL EVERY 4 HOURS PRN
Qty: 30 TABLET | Refills: 5 | Status: ON HOLD | OUTPATIENT
Start: 2018-01-01 | End: 2018-01-01

## 2018-01-01 RX ORDER — METOPROLOL TARTRATE 25 MG/1
25 TABLET, FILM COATED ORAL 2 TIMES DAILY
Status: DISCONTINUED | OUTPATIENT
Start: 2018-01-01 | End: 2018-01-01 | Stop reason: HOSPADM

## 2018-01-01 RX ORDER — IBUPROFEN 400 MG/1
400 TABLET, FILM COATED ORAL ONCE
Status: COMPLETED | OUTPATIENT
Start: 2018-01-01 | End: 2018-01-01

## 2018-01-01 RX ORDER — METOPROLOL SUCCINATE 50 MG/1
TABLET, EXTENDED RELEASE ORAL
Qty: 90 TABLET | Refills: 1 | Status: SHIPPED | OUTPATIENT
Start: 2018-01-01 | End: 2018-01-01

## 2018-01-01 RX ORDER — ASCORBIC ACID 500 MG
500 TABLET ORAL DAILY
Status: DISCONTINUED | OUTPATIENT
Start: 2018-01-01 | End: 2018-01-01

## 2018-01-01 RX ORDER — ASPIRIN 325 MG
325 TABLET ORAL DAILY
Qty: 30 TABLET | Refills: 5 | Status: ON HOLD | OUTPATIENT
Start: 2018-01-01 | End: 2018-01-01

## 2018-01-01 RX ORDER — FUROSEMIDE 20 MG
TABLET ORAL
Qty: 30 TABLET | Refills: 0 | Status: ON HOLD | OUTPATIENT
Start: 2018-01-01 | End: 2018-01-01

## 2018-01-01 RX ORDER — FUROSEMIDE 10 MG/ML
40 INJECTION INTRAMUSCULAR; INTRAVENOUS ONCE
Status: COMPLETED | OUTPATIENT
Start: 2018-01-01 | End: 2018-01-01

## 2018-01-01 RX ORDER — QUETIAPINE FUMARATE 25 MG/1
25 TABLET, FILM COATED ORAL 2 TIMES DAILY
Status: DISCONTINUED | OUTPATIENT
Start: 2018-01-01 | End: 2018-01-01 | Stop reason: HOSPADM

## 2018-01-01 RX ORDER — DEXAMETHASONE 4 MG/1
4 TABLET ORAL
Status: DISCONTINUED | OUTPATIENT
Start: 2018-01-01 | End: 2018-01-01

## 2018-01-01 RX ORDER — POTASSIUM CHLORIDE 29.8 MG/ML
20 INJECTION INTRAVENOUS
Status: DISCONTINUED | OUTPATIENT
Start: 2018-01-01 | End: 2018-01-01 | Stop reason: HOSPADM

## 2018-01-01 RX ORDER — BISACODYL 10 MG
10 SUPPOSITORY, RECTAL RECTAL DAILY PRN
Status: DISCONTINUED | OUTPATIENT
Start: 2018-01-01 | End: 2018-01-01 | Stop reason: HOSPADM

## 2018-01-01 RX ORDER — LEVALBUTEROL 1.25 MG/.5ML
1.25 SOLUTION, CONCENTRATE RESPIRATORY (INHALATION) EVERY 6 HOURS PRN
Status: DISCONTINUED | OUTPATIENT
Start: 2018-01-01 | End: 2018-01-01 | Stop reason: HOSPADM

## 2018-01-01 RX ORDER — LEVOFLOXACIN 500 MG/1
500 TABLET, FILM COATED ORAL DAILY
Qty: 7 TABLET | Refills: 0 | DISCHARGE
Start: 2018-01-01 | End: 2018-01-01

## 2018-01-01 RX ORDER — LEVOTHYROXINE SODIUM 50 UG/1
50 TABLET ORAL
Status: DISCONTINUED | OUTPATIENT
Start: 2018-01-01 | End: 2018-01-01 | Stop reason: HOSPADM

## 2018-01-01 RX ORDER — HYDRALAZINE HYDROCHLORIDE 20 MG/ML
10 INJECTION INTRAMUSCULAR; INTRAVENOUS EVERY 4 HOURS PRN
Status: DISCONTINUED | OUTPATIENT
Start: 2018-01-01 | End: 2018-01-01

## 2018-01-01 RX ORDER — ACETAMINOPHEN 325 MG/1
650 TABLET ORAL ONCE
Status: COMPLETED | OUTPATIENT
Start: 2018-01-01 | End: 2018-01-01

## 2018-01-01 RX ORDER — ASPIRIN 325 MG
325 TABLET ORAL DAILY
Status: DISCONTINUED | OUTPATIENT
Start: 2018-01-01 | End: 2018-01-01

## 2018-01-01 RX ORDER — AMLODIPINE BESYLATE 10 MG/1
10 TABLET ORAL DAILY
Qty: 30 TABLET | Status: ON HOLD | DISCHARGE
Start: 2018-01-01 | End: 2018-01-01

## 2018-01-01 RX ORDER — MEROPENEM AND SODIUM CHLORIDE 1 G/50ML
1 INJECTION, SOLUTION INTRAVENOUS EVERY 12 HOURS
Status: DISCONTINUED | OUTPATIENT
Start: 2018-01-01 | End: 2018-01-01

## 2018-01-01 RX ORDER — ACETAMINOPHEN 325 MG/1
975 TABLET ORAL ONCE
Status: COMPLETED | OUTPATIENT
Start: 2018-01-01 | End: 2018-01-01

## 2018-01-01 RX ORDER — DEXAMETHASONE 4 MG/1
TABLET ORAL
Qty: 30 TABLET | Refills: 0 | Status: SHIPPED | OUTPATIENT
Start: 2018-01-01 | End: 2018-01-01

## 2018-01-01 RX ORDER — METOPROLOL TARTRATE 25 MG/1
25 TABLET, FILM COATED ORAL ONCE
Status: DISCONTINUED | OUTPATIENT
Start: 2018-01-01 | End: 2018-01-01

## 2018-01-01 RX ORDER — NYSTATIN 100000 [USP'U]/G
POWDER TOPICAL 4 TIMES DAILY
COMMUNITY
End: 2018-01-01

## 2018-01-01 RX ORDER — LEVOTHYROXINE SODIUM 25 UG/1
25 TABLET ORAL
Status: DISCONTINUED | OUTPATIENT
Start: 2018-01-01 | End: 2018-01-01

## 2018-01-01 RX ORDER — FUROSEMIDE 20 MG
TABLET ORAL
Qty: 30 TABLET | Refills: 2 | Status: CANCELLED | OUTPATIENT
Start: 2018-01-01

## 2018-01-01 RX ORDER — LEVOFLOXACIN 500 MG/1
500 TABLET, FILM COATED ORAL DAILY
Status: DISCONTINUED | OUTPATIENT
Start: 2018-01-01 | End: 2018-01-01

## 2018-01-01 RX ORDER — ACETAMINOPHEN 650 MG/1
650 SUPPOSITORY RECTAL EVERY 4 HOURS PRN
Qty: 12 SUPPOSITORY | Refills: 1 | Status: SHIPPED | OUTPATIENT
Start: 2018-01-01

## 2018-01-01 RX ADMIN — AMLODIPINE BESYLATE 5 MG: 5 TABLET ORAL at 08:31

## 2018-01-01 RX ADMIN — ASPIRIN 325 MG ORAL TABLET 325 MG: 325 PILL ORAL at 08:28

## 2018-01-01 RX ADMIN — ASPIRIN 325 MG ORAL TABLET 325 MG: 325 PILL ORAL at 08:53

## 2018-01-01 RX ADMIN — VITAMIN D, TAB 1000IU (100/BT) 1000 UNITS: 25 TAB at 09:18

## 2018-01-01 RX ADMIN — AMLODIPINE BESYLATE 10 MG: 10 TABLET ORAL at 08:17

## 2018-01-01 RX ADMIN — GEMFIBROZIL 600 MG: 600 TABLET ORAL at 08:59

## 2018-01-01 RX ADMIN — DEXAMETHASONE SODIUM PHOSPHATE 20 MG: 10 INJECTION, SOLUTION INTRAMUSCULAR; INTRAVENOUS at 12:23

## 2018-01-01 RX ADMIN — HUMAN ALBUMIN MICROSPHERES AND PERFLUTREN 9 ML: 10; .22 INJECTION, SOLUTION INTRAVENOUS at 11:01

## 2018-01-01 RX ADMIN — LISINOPRIL 15 MG: 10 TABLET ORAL at 08:31

## 2018-01-01 RX ADMIN — INSULIN ASPART 11 UNITS: 100 INJECTION, SOLUTION INTRAVENOUS; SUBCUTANEOUS at 12:49

## 2018-01-01 RX ADMIN — ACYCLOVIR 400 MG: 200 CAPSULE ORAL at 21:42

## 2018-01-01 RX ADMIN — MEROPENEM 1 G: 1 INJECTION, POWDER, FOR SOLUTION INTRAVENOUS at 17:34

## 2018-01-01 RX ADMIN — ACYCLOVIR 400 MG: 400 TABLET ORAL at 08:32

## 2018-01-01 RX ADMIN — Medication 12.5 MG: at 04:53

## 2018-01-01 RX ADMIN — SODIUM CHLORIDE: 9 INJECTION, SOLUTION INTRAVENOUS at 05:05

## 2018-01-01 RX ADMIN — VITAMIN D, TAB 1000IU (100/BT) 1000 UNITS: 25 TAB at 08:53

## 2018-01-01 RX ADMIN — METOPROLOL TARTRATE 25 MG: 25 TABLET ORAL at 22:41

## 2018-01-01 RX ADMIN — GEMFIBROZIL 600 MG: 600 TABLET ORAL at 17:50

## 2018-01-01 RX ADMIN — METOPROLOL TARTRATE 25 MG: 25 TABLET ORAL at 09:18

## 2018-01-01 RX ADMIN — INSULIN ASPART 3 UNITS: 100 INJECTION, SOLUTION INTRAVENOUS; SUBCUTANEOUS at 15:42

## 2018-01-01 RX ADMIN — ASPIRIN 325 MG ORAL TABLET 325 MG: 325 PILL ORAL at 09:16

## 2018-01-01 RX ADMIN — METOPROLOL TARTRATE 25 MG: 25 TABLET ORAL at 21:34

## 2018-01-01 RX ADMIN — OMEPRAZOLE 20 MG: 20 CAPSULE, DELAYED RELEASE ORAL at 09:59

## 2018-01-01 RX ADMIN — CARFILZOMIB 120 MG: 60 INJECTION, POWDER, LYOPHILIZED, FOR SOLUTION INTRAVENOUS at 10:48

## 2018-01-01 RX ADMIN — GEMFIBROZIL 600 MG: 600 TABLET ORAL at 20:38

## 2018-01-01 RX ADMIN — CARFILZOMIB 97 MG: 60 INJECTION, POWDER, LYOPHILIZED, FOR SOLUTION INTRAVENOUS at 12:18

## 2018-01-01 RX ADMIN — HALOPERIDOL 2 MG: 2 SOLUTION ORAL at 22:50

## 2018-01-01 RX ADMIN — POLYETHYLENE GLYCOL 3350 17 G: 17 POWDER, FOR SOLUTION ORAL at 15:00

## 2018-01-01 RX ADMIN — SENNOSIDES AND DOCUSATE SODIUM 1 TABLET: 8.6; 5 TABLET ORAL at 13:00

## 2018-01-01 RX ADMIN — AMLODIPINE BESYLATE 10 MG: 10 TABLET ORAL at 09:45

## 2018-01-01 RX ADMIN — LEVOTHYROXINE SODIUM 25 MCG: 25 TABLET ORAL at 10:36

## 2018-01-01 RX ADMIN — OXYCODONE HYDROCHLORIDE AND ACETAMINOPHEN 1000 MG: 500 TABLET ORAL at 08:32

## 2018-01-01 RX ADMIN — SODIUM CHLORIDE: 9 INJECTION, SOLUTION INTRAVENOUS at 02:37

## 2018-01-01 RX ADMIN — METOPROLOL TARTRATE 25 MG: 25 TABLET ORAL at 08:59

## 2018-01-01 RX ADMIN — CARFILZOMIB 97 MG: 60 INJECTION, POWDER, LYOPHILIZED, FOR SOLUTION INTRAVENOUS at 13:30

## 2018-01-01 RX ADMIN — GEMFIBROZIL 600 MG: 600 TABLET ORAL at 20:41

## 2018-01-01 RX ADMIN — QUETIAPINE FUMARATE 25 MG: 25 TABLET ORAL at 08:40

## 2018-01-01 RX ADMIN — OXYCODONE HYDROCHLORIDE AND ACETAMINOPHEN 1000 MG: 500 TABLET ORAL at 08:17

## 2018-01-01 RX ADMIN — GEMFIBROZIL 600 MG: 600 TABLET ORAL at 08:58

## 2018-01-01 RX ADMIN — VITAMIN D, TAB 1000IU (100/BT) 1000 UNITS: 25 TAB at 08:31

## 2018-01-01 RX ADMIN — METOPROLOL SUCCINATE 50 MG: 50 TABLET, EXTENDED RELEASE ORAL at 09:09

## 2018-01-01 RX ADMIN — ACYCLOVIR 400 MG: 200 CAPSULE ORAL at 19:47

## 2018-01-01 RX ADMIN — CARFILZOMIB 43 MG: 60 INJECTION, POWDER, LYOPHILIZED, FOR SOLUTION INTRAVENOUS at 12:44

## 2018-01-01 RX ADMIN — FAMOTIDINE 20 MG: 20 INJECTION, SOLUTION INTRAVENOUS at 11:58

## 2018-01-01 RX ADMIN — DEXAMETHASONE SODIUM PHOSPHATE 20 MG: 10 INJECTION, SOLUTION INTRAMUSCULAR; INTRAVENOUS at 09:59

## 2018-01-01 RX ADMIN — ACETAMINOPHEN 650 MG: 325 TABLET ORAL at 17:26

## 2018-01-01 RX ADMIN — SODIUM CHLORIDE 250 ML: 9 INJECTION, SOLUTION INTRAVENOUS at 09:15

## 2018-01-01 RX ADMIN — FERROUS SULFATE TAB 325 MG (65 MG ELEMENTAL FE) 325 MG: 325 (65 FE) TAB at 09:18

## 2018-01-01 RX ADMIN — SODIUM CHLORIDE, PRESERVATIVE FREE 5 ML: 5 INJECTION INTRAVENOUS at 11:16

## 2018-01-01 RX ADMIN — PIPERACILLIN SODIUM,TAZOBACTAM SODIUM 3.38 G: 3; .375 INJECTION, POWDER, FOR SOLUTION INTRAVENOUS at 11:09

## 2018-01-01 RX ADMIN — ACYCLOVIR 400 MG: 200 CAPSULE ORAL at 09:16

## 2018-01-01 RX ADMIN — FERROUS SULFATE TAB 325 MG (65 MG ELEMENTAL FE) 325 MG: 325 (65 FE) TAB at 08:39

## 2018-01-01 RX ADMIN — DEXAMETHASONE 4 MG: 4 TABLET ORAL at 08:17

## 2018-01-01 RX ADMIN — WATER 1 G: 1 INJECTION INTRAMUSCULAR; INTRAVENOUS; SUBCUTANEOUS at 10:58

## 2018-01-01 RX ADMIN — METOPROLOL SUCCINATE 50 MG: 50 TABLET, EXTENDED RELEASE ORAL at 09:56

## 2018-01-01 RX ADMIN — SODIUM CHLORIDE 500 ML: 9 INJECTION, SOLUTION INTRAVENOUS at 12:00

## 2018-01-01 RX ADMIN — Medication 1.5 ML: at 15:02

## 2018-01-01 RX ADMIN — FERROUS SULFATE TAB 325 MG (65 MG ELEMENTAL FE) 325 MG: 325 (65 FE) TAB at 08:15

## 2018-01-01 RX ADMIN — METOPROLOL SUCCINATE 50 MG: 50 TABLET, EXTENDED RELEASE ORAL at 08:28

## 2018-01-01 RX ADMIN — ACETAMINOPHEN 650 MG: 325 TABLET ORAL at 21:27

## 2018-01-01 RX ADMIN — IBUPROFEN 400 MG: 400 TABLET ORAL at 09:16

## 2018-01-01 RX ADMIN — LEVOFLOXACIN 500 MG: 5 INJECTION, SOLUTION INTRAVENOUS at 13:27

## 2018-01-01 RX ADMIN — OMEPRAZOLE 20 MG: 20 CAPSULE, DELAYED RELEASE ORAL at 09:18

## 2018-01-01 RX ADMIN — GEMFIBROZIL 600 MG: 600 TABLET ORAL at 09:18

## 2018-01-01 RX ADMIN — WATER 20 ML: 1 INJECTION INTRAMUSCULAR; INTRAVENOUS; SUBCUTANEOUS at 17:34

## 2018-01-01 RX ADMIN — QUETIAPINE FUMARATE 25 MG: 25 TABLET ORAL at 20:36

## 2018-01-01 RX ADMIN — LEVOTHYROXINE SODIUM 25 MCG: 25 TABLET ORAL at 08:31

## 2018-01-01 RX ADMIN — ACYCLOVIR 400 MG: 400 TABLET ORAL at 20:53

## 2018-01-01 RX ADMIN — INSULIN GLARGINE 10 UNITS: 100 INJECTION, SOLUTION SUBCUTANEOUS at 06:38

## 2018-01-01 RX ADMIN — FERROUS SULFATE TAB 325 MG (65 MG ELEMENTAL FE) 325 MG: 325 (65 FE) TAB at 12:52

## 2018-01-01 RX ADMIN — METOPROLOL SUCCINATE 50 MG: 50 TABLET, EXTENDED RELEASE ORAL at 08:53

## 2018-01-01 RX ADMIN — LEVOTHYROXINE SODIUM 25 MCG: 25 TABLET ORAL at 09:56

## 2018-01-01 RX ADMIN — ONDANSETRON 4 MG: 2 INJECTION INTRAMUSCULAR; INTRAVENOUS at 10:56

## 2018-01-01 RX ADMIN — GEMFIBROZIL 600 MG: 600 TABLET ORAL at 20:33

## 2018-01-01 RX ADMIN — INSULIN ASPART 13 UNITS: 100 INJECTION, SOLUTION INTRAVENOUS; SUBCUTANEOUS at 08:20

## 2018-01-01 RX ADMIN — Medication 12.5 MG: at 09:15

## 2018-01-01 RX ADMIN — CARFILZOMIB 97 MG: 60 INJECTION, POWDER, LYOPHILIZED, FOR SOLUTION INTRAVENOUS at 11:57

## 2018-01-01 RX ADMIN — PIPERACILLIN SODIUM,TAZOBACTAM SODIUM 4.5 G: 4; .5 INJECTION, POWDER, FOR SOLUTION INTRAVENOUS at 02:56

## 2018-01-01 RX ADMIN — GEMFIBROZIL 600 MG: 600 TABLET ORAL at 09:16

## 2018-01-01 RX ADMIN — SODIUM CHLORIDE 500 ML: 9 INJECTION, SOLUTION INTRAVENOUS at 10:15

## 2018-01-01 RX ADMIN — GEMFIBROZIL 600 MG: 600 TABLET ORAL at 21:33

## 2018-01-01 RX ADMIN — SODIUM CHLORIDE, POTASSIUM CHLORIDE, SODIUM LACTATE AND CALCIUM CHLORIDE 1000 ML: 600; 310; 30; 20 INJECTION, SOLUTION INTRAVENOUS at 02:30

## 2018-01-01 RX ADMIN — OMEPRAZOLE 20 MG: 20 CAPSULE, DELAYED RELEASE ORAL at 09:47

## 2018-01-01 RX ADMIN — VANCOMYCIN HYDROCHLORIDE 1750 MG: 5 INJECTION, POWDER, LYOPHILIZED, FOR SOLUTION INTRAVENOUS at 14:59

## 2018-01-01 RX ADMIN — ACYCLOVIR 400 MG: 400 TABLET ORAL at 20:38

## 2018-01-01 RX ADMIN — ACYCLOVIR 400 MG: 400 TABLET ORAL at 21:34

## 2018-01-01 RX ADMIN — ERTAPENEM SODIUM 1 G: 1 INJECTION, POWDER, LYOPHILIZED, FOR SOLUTION INTRAMUSCULAR; INTRAVENOUS at 23:07

## 2018-01-01 RX ADMIN — MEROPENEM 1 G: 1 INJECTION, POWDER, FOR SOLUTION INTRAVENOUS at 06:16

## 2018-01-01 RX ADMIN — ACYCLOVIR 400 MG: 200 CAPSULE ORAL at 20:38

## 2018-01-01 RX ADMIN — INSULIN ASPART 8 UNITS: 100 INJECTION, SOLUTION INTRAVENOUS; SUBCUTANEOUS at 12:18

## 2018-01-01 RX ADMIN — BORTEZOMIB 2.8 MG: 3.5 INJECTION, POWDER, LYOPHILIZED, FOR SOLUTION INTRAVENOUS; SUBCUTANEOUS at 09:29

## 2018-01-01 RX ADMIN — LEVOTHYROXINE SODIUM 25 MCG: 25 TABLET ORAL at 09:00

## 2018-01-01 RX ADMIN — ERTAPENEM SODIUM 1 G: 1 INJECTION, POWDER, LYOPHILIZED, FOR SOLUTION INTRAMUSCULAR; INTRAVENOUS at 10:59

## 2018-01-01 RX ADMIN — ACYCLOVIR 400 MG: 400 TABLET ORAL at 09:45

## 2018-01-01 RX ADMIN — FUROSEMIDE 20 MG: 20 TABLET ORAL at 09:11

## 2018-01-01 RX ADMIN — FERROUS SULFATE TAB 325 MG (65 MG ELEMENTAL FE) 325 MG: 325 (65 FE) TAB at 09:45

## 2018-01-01 RX ADMIN — LEVOFLOXACIN 500 MG: 5 INJECTION, SOLUTION INTRAVENOUS at 16:37

## 2018-01-01 RX ADMIN — INSULIN DETEMIR 20 UNITS: 100 INJECTION, SOLUTION SUBCUTANEOUS at 09:59

## 2018-01-01 RX ADMIN — FUROSEMIDE 20 MG: 20 TABLET ORAL at 09:19

## 2018-01-01 RX ADMIN — Medication 12.5 MG: at 09:37

## 2018-01-01 RX ADMIN — ACYCLOVIR 400 MG: 200 CAPSULE ORAL at 02:32

## 2018-01-01 RX ADMIN — AMLODIPINE BESYLATE 5 MG: 5 TABLET ORAL at 09:57

## 2018-01-01 RX ADMIN — DARBEPOETIN ALFA 300 MCG: 300 INJECTION, SOLUTION INTRAVENOUS; SUBCUTANEOUS at 09:19

## 2018-01-01 RX ADMIN — LEVOTHYROXINE SODIUM 50 MCG: 50 TABLET ORAL at 08:29

## 2018-01-01 RX ADMIN — WATER 1 G: 1 INJECTION INTRAMUSCULAR; INTRAVENOUS; SUBCUTANEOUS at 12:37

## 2018-01-01 RX ADMIN — CARFILZOMIB 97 MG: 60 INJECTION, POWDER, LYOPHILIZED, FOR SOLUTION INTRAVENOUS at 11:37

## 2018-01-01 RX ADMIN — WATER 10 ML: 1 INJECTION INTRAMUSCULAR; INTRAVENOUS; SUBCUTANEOUS at 07:00

## 2018-01-01 RX ADMIN — INSULIN ASPART 11 UNITS: 100 INJECTION, SOLUTION INTRAVENOUS; SUBCUTANEOUS at 12:07

## 2018-01-01 RX ADMIN — BORTEZOMIB 2.8 MG: 3.5 INJECTION, POWDER, LYOPHILIZED, FOR SOLUTION INTRAVENOUS; SUBCUTANEOUS at 09:31

## 2018-01-01 RX ADMIN — WATER 1 G: 1 INJECTION INTRAMUSCULAR; INTRAVENOUS; SUBCUTANEOUS at 12:17

## 2018-01-01 RX ADMIN — OMEPRAZOLE 20 MG: 20 CAPSULE, DELAYED RELEASE ORAL at 08:59

## 2018-01-01 RX ADMIN — WATER 20 ML: 1 INJECTION INTRAMUSCULAR; INTRAVENOUS; SUBCUTANEOUS at 19:05

## 2018-01-01 RX ADMIN — DILTIAZEM HYDROCHLORIDE 10 MG: 5 INJECTION INTRAVENOUS at 14:13

## 2018-01-01 RX ADMIN — GEMFIBROZIL 600 MG: 600 TABLET ORAL at 20:53

## 2018-01-01 RX ADMIN — EPINEPHRINE 0.3 MG: 1 INJECTION INTRAMUSCULAR; INTRAVENOUS; SUBCUTANEOUS at 11:54

## 2018-01-01 RX ADMIN — CEFTRIAXONE 1 G: 1 INJECTION, POWDER, FOR SOLUTION INTRAMUSCULAR; INTRAVENOUS at 20:18

## 2018-01-01 RX ADMIN — ERTAPENEM SODIUM 1 G: 1 INJECTION, POWDER, LYOPHILIZED, FOR SOLUTION INTRAMUSCULAR; INTRAVENOUS at 12:03

## 2018-01-01 RX ADMIN — ACYCLOVIR 400 MG: 200 CAPSULE ORAL at 21:12

## 2018-01-01 RX ADMIN — DEXAMETHASONE SODIUM PHOSPHATE 20 MG: 10 INJECTION, SOLUTION INTRAMUSCULAR; INTRAVENOUS at 10:47

## 2018-01-01 RX ADMIN — TAMSULOSIN HYDROCHLORIDE 0.4 MG: 0.4 CAPSULE ORAL at 09:18

## 2018-01-01 RX ADMIN — ACYCLOVIR 400 MG: 400 TABLET ORAL at 22:40

## 2018-01-01 RX ADMIN — ASPIRIN 325 MG ORAL TABLET 325 MG: 325 PILL ORAL at 08:39

## 2018-01-01 RX ADMIN — METOPROLOL SUCCINATE 50 MG: 50 TABLET, EXTENDED RELEASE ORAL at 09:16

## 2018-01-01 RX ADMIN — ENOXAPARIN SODIUM 40 MG: 40 INJECTION SUBCUTANEOUS at 16:40

## 2018-01-01 RX ADMIN — METOPROLOL TARTRATE 25 MG: 25 TABLET ORAL at 08:02

## 2018-01-01 RX ADMIN — VITAMIN D, TAB 1000IU (100/BT) 1000 UNITS: 25 TAB at 08:28

## 2018-01-01 RX ADMIN — SODIUM CHLORIDE 250 ML: 9 INJECTION, SOLUTION INTRAVENOUS at 12:44

## 2018-01-01 RX ADMIN — GEMFIBROZIL 600 MG: 600 TABLET ORAL at 09:06

## 2018-01-01 RX ADMIN — PIPERACILLIN SODIUM,TAZOBACTAM SODIUM 4.5 G: 4; .5 INJECTION, POWDER, FOR SOLUTION INTRAVENOUS at 03:00

## 2018-01-01 RX ADMIN — LEVOTHYROXINE SODIUM 50 MCG: 50 TABLET ORAL at 09:27

## 2018-01-01 RX ADMIN — DARBEPOETIN ALFA 300 MCG: 300 INJECTION, SOLUTION INTRAVENOUS; SUBCUTANEOUS at 11:51

## 2018-01-01 RX ADMIN — GEMFIBROZIL 600 MG: 600 TABLET ORAL at 08:53

## 2018-01-01 RX ADMIN — LEVOTHYROXINE SODIUM 25 MCG: 25 TABLET ORAL at 08:15

## 2018-01-01 RX ADMIN — WATER 1 G: 1 INJECTION INTRAMUSCULAR; INTRAVENOUS; SUBCUTANEOUS at 12:47

## 2018-01-01 RX ADMIN — INSULIN DETEMIR 7 UNITS: 100 INJECTION, SOLUTION SUBCUTANEOUS at 09:15

## 2018-01-01 RX ADMIN — ACYCLOVIR 400 MG: 400 TABLET ORAL at 21:37

## 2018-01-01 RX ADMIN — METOPROLOL SUCCINATE 50 MG: 50 TABLET, EXTENDED RELEASE ORAL at 08:39

## 2018-01-01 RX ADMIN — PIPERACILLIN SODIUM,TAZOBACTAM SODIUM 4.5 G: 4; .5 INJECTION, POWDER, FOR SOLUTION INTRAVENOUS at 20:54

## 2018-01-01 RX ADMIN — TAMSULOSIN HYDROCHLORIDE 0.4 MG: 0.4 CAPSULE ORAL at 08:59

## 2018-01-01 RX ADMIN — ERTAPENEM SODIUM 1 G: 1 INJECTION, POWDER, LYOPHILIZED, FOR SOLUTION INTRAMUSCULAR; INTRAVENOUS at 23:11

## 2018-01-01 RX ADMIN — INSULIN ASPART 1 UNITS: 100 INJECTION, SOLUTION INTRAVENOUS; SUBCUTANEOUS at 08:52

## 2018-01-01 RX ADMIN — GEMFIBROZIL 600 MG: 600 TABLET ORAL at 08:02

## 2018-01-01 RX ADMIN — ASPIRIN 325 MG ORAL TABLET 325 MG: 325 PILL ORAL at 09:01

## 2018-01-01 RX ADMIN — METOPROLOL SUCCINATE 50 MG: 50 TABLET, EXTENDED RELEASE ORAL at 09:44

## 2018-01-01 RX ADMIN — INSULIN ASPART 4 UNITS: 100 INJECTION, SOLUTION INTRAVENOUS; SUBCUTANEOUS at 08:59

## 2018-01-01 RX ADMIN — VITAMIN D, TAB 1000IU (100/BT) 1000 UNITS: 25 TAB at 09:42

## 2018-01-01 RX ADMIN — METOPROLOL SUCCINATE 50 MG: 50 TABLET, EXTENDED RELEASE ORAL at 08:15

## 2018-01-01 RX ADMIN — ACYCLOVIR 400 MG: 400 TABLET ORAL at 21:35

## 2018-01-01 RX ADMIN — POLYETHYLENE GLYCOL 3350 17 G: 17 POWDER, FOR SOLUTION ORAL at 14:48

## 2018-01-01 RX ADMIN — LEVOTHYROXINE SODIUM 25 MCG: 25 TABLET ORAL at 08:33

## 2018-01-01 RX ADMIN — ACYCLOVIR 400 MG: 200 CAPSULE ORAL at 21:33

## 2018-01-01 RX ADMIN — DARBEPOETIN ALFA 300 MCG: 300 INJECTION, SOLUTION INTRAVENOUS; SUBCUTANEOUS at 13:18

## 2018-01-01 RX ADMIN — FERROUS SULFATE TAB 325 MG (65 MG ELEMENTAL FE) 325 MG: 325 (65 FE) TAB at 09:09

## 2018-01-01 RX ADMIN — AMLODIPINE BESYLATE 10 MG: 10 TABLET ORAL at 08:59

## 2018-01-01 RX ADMIN — ACYCLOVIR 400 MG: 200 CAPSULE ORAL at 09:00

## 2018-01-01 RX ADMIN — INSULIN ASPART 11 UNITS: 100 INJECTION, SOLUTION INTRAVENOUS; SUBCUTANEOUS at 13:11

## 2018-01-01 RX ADMIN — LISINOPRIL 15 MG: 5 TABLET ORAL at 08:15

## 2018-01-01 RX ADMIN — PIPERACILLIN SODIUM,TAZOBACTAM SODIUM 4.5 G: 4; .5 INJECTION, POWDER, FOR SOLUTION INTRAVENOUS at 08:16

## 2018-01-01 RX ADMIN — SULFUR HEXAFLUORIDE 5 ML: KIT at 10:02

## 2018-01-01 RX ADMIN — Medication 12.5 MG: at 03:10

## 2018-01-01 RX ADMIN — WATER 1 G: 1 INJECTION INTRAMUSCULAR; INTRAVENOUS; SUBCUTANEOUS at 10:44

## 2018-01-01 RX ADMIN — OMEPRAZOLE 20 MG: 20 CAPSULE, DELAYED RELEASE ORAL at 08:31

## 2018-01-01 RX ADMIN — SODIUM CHLORIDE 1000 ML: 9 INJECTION, SOLUTION INTRAVENOUS at 11:30

## 2018-01-01 RX ADMIN — SODIUM CHLORIDE, PRESERVATIVE FREE: 5 INJECTION INTRAVENOUS at 10:37

## 2018-01-01 RX ADMIN — OMEPRAZOLE 20 MG: 20 CAPSULE, DELAYED RELEASE ORAL at 08:32

## 2018-01-01 RX ADMIN — VITAMIN D, TAB 1000IU (100/BT) 1000 UNITS: 25 TAB at 08:15

## 2018-01-01 RX ADMIN — VITAMIN D, TAB 1000IU (100/BT) 1000 UNITS: 25 TAB at 08:17

## 2018-01-01 RX ADMIN — INSULIN ASPART 4 UNITS: 100 INJECTION, SOLUTION INTRAVENOUS; SUBCUTANEOUS at 09:15

## 2018-01-01 RX ADMIN — ACYCLOVIR 400 MG: 200 CAPSULE ORAL at 08:28

## 2018-01-01 RX ADMIN — ACYCLOVIR 400 MG: 400 TABLET ORAL at 09:59

## 2018-01-01 RX ADMIN — PIPERACILLIN SODIUM,TAZOBACTAM SODIUM 4.5 G: 4; .5 INJECTION, POWDER, FOR SOLUTION INTRAVENOUS at 21:33

## 2018-01-01 RX ADMIN — FERROUS SULFATE TAB 325 MG (65 MG ELEMENTAL FE) 325 MG: 325 (65 FE) TAB at 08:59

## 2018-01-01 RX ADMIN — INSULIN ASPART 5 UNITS: 100 INJECTION, SOLUTION INTRAVENOUS; SUBCUTANEOUS at 04:00

## 2018-01-01 RX ADMIN — SODIUM CHLORIDE 250 ML: 9 INJECTION, SOLUTION INTRAVENOUS at 10:47

## 2018-01-01 RX ADMIN — ACYCLOVIR 400 MG: 200 CAPSULE ORAL at 21:27

## 2018-01-01 RX ADMIN — PIPERACILLIN SODIUM,TAZOBACTAM SODIUM 4.5 G: 4; .5 INJECTION, POWDER, FOR SOLUTION INTRAVENOUS at 15:25

## 2018-01-01 RX ADMIN — FUROSEMIDE 20 MG: 20 TABLET ORAL at 08:32

## 2018-01-01 RX ADMIN — INSULIN DETEMIR 16 UNITS: 100 INJECTION, SOLUTION SUBCUTANEOUS at 09:00

## 2018-01-01 RX ADMIN — SODIUM CHLORIDE 250 ML: 9 INJECTION, SOLUTION INTRAVENOUS at 11:37

## 2018-01-01 RX ADMIN — ACYCLOVIR 400 MG: 200 CAPSULE ORAL at 08:16

## 2018-01-01 RX ADMIN — AMLODIPINE BESYLATE 5 MG: 5 TABLET ORAL at 09:18

## 2018-01-01 RX ADMIN — GEMFIBROZIL 600 MG: 600 TABLET ORAL at 09:46

## 2018-01-01 RX ADMIN — INSULIN DETEMIR 7 UNITS: 100 INJECTION, SOLUTION SUBCUTANEOUS at 06:38

## 2018-01-01 RX ADMIN — ACYCLOVIR 400 MG: 400 TABLET ORAL at 21:33

## 2018-01-01 RX ADMIN — GEMFIBROZIL 600 MG: 600 TABLET ORAL at 08:14

## 2018-01-01 RX ADMIN — LEVOTHYROXINE SODIUM 25 MCG: 25 TABLET ORAL at 06:32

## 2018-01-01 RX ADMIN — LISINOPRIL 15 MG: 5 TABLET ORAL at 09:09

## 2018-01-01 RX ADMIN — DARBEPOETIN ALFA 300 MCG: 300 INJECTION, SOLUTION INTRAVENOUS; SUBCUTANEOUS at 15:26

## 2018-01-01 RX ADMIN — INSULIN ASPART 13 UNITS: 100 INJECTION, SOLUTION INTRAVENOUS; SUBCUTANEOUS at 08:10

## 2018-01-01 RX ADMIN — MEROPENEM 1 G: 1 INJECTION, POWDER, FOR SOLUTION INTRAVENOUS at 06:59

## 2018-01-01 RX ADMIN — SODIUM CHLORIDE: 9 INJECTION, SOLUTION INTRAVENOUS at 06:42

## 2018-01-01 RX ADMIN — CARFILZOMIB 43 MG: 60 INJECTION, POWDER, LYOPHILIZED, FOR SOLUTION INTRAVENOUS at 12:23

## 2018-01-01 RX ADMIN — DARBEPOETIN ALFA 300 MCG: 300 INJECTION, SOLUTION INTRAVENOUS; SUBCUTANEOUS at 09:57

## 2018-01-01 RX ADMIN — TAMSULOSIN HYDROCHLORIDE 0.4 MG: 0.4 CAPSULE ORAL at 22:41

## 2018-01-01 RX ADMIN — LEVOTHYROXINE SODIUM 25 MCG: 25 TABLET ORAL at 09:18

## 2018-01-01 RX ADMIN — WATER 1 G: 1 INJECTION INTRAMUSCULAR; INTRAVENOUS; SUBCUTANEOUS at 12:51

## 2018-01-01 RX ADMIN — SODIUM CHLORIDE 1000 ML: 9 INJECTION, SOLUTION INTRAVENOUS at 09:09

## 2018-01-01 RX ADMIN — AMLODIPINE BESYLATE 10 MG: 10 TABLET ORAL at 08:39

## 2018-01-01 RX ADMIN — ACETAMINOPHEN 650 MG: 325 TABLET ORAL at 08:37

## 2018-01-01 RX ADMIN — INSULIN ASPART 4 UNITS: 100 INJECTION, SOLUTION INTRAVENOUS; SUBCUTANEOUS at 12:34

## 2018-01-01 RX ADMIN — MEPERIDINE HYDROCHLORIDE 12.5 MG: 25 INJECTION, SOLUTION INTRAMUSCULAR; INTRAVENOUS; SUBCUTANEOUS at 12:02

## 2018-01-01 RX ADMIN — MEROPENEM 1 G: 1 INJECTION, POWDER, FOR SOLUTION INTRAVENOUS at 19:05

## 2018-01-01 RX ADMIN — SODIUM CHLORIDE: 9 INJECTION, SOLUTION INTRAVENOUS at 15:19

## 2018-01-01 RX ADMIN — ACYCLOVIR 400 MG: 400 TABLET ORAL at 09:19

## 2018-01-01 RX ADMIN — AMLODIPINE BESYLATE 10 MG: 10 TABLET ORAL at 09:16

## 2018-01-01 RX ADMIN — OXYCODONE HYDROCHLORIDE AND ACETAMINOPHEN 1000 MG: 500 TABLET ORAL at 09:58

## 2018-01-01 RX ADMIN — SODIUM CHLORIDE: 9 INJECTION, SOLUTION INTRAVENOUS at 18:08

## 2018-01-01 RX ADMIN — SODIUM CHLORIDE 1000 ML: 9 INJECTION, SOLUTION INTRAVENOUS at 22:21

## 2018-01-01 RX ADMIN — ACETAMINOPHEN 650 MG: 325 TABLET ORAL at 01:47

## 2018-01-01 RX ADMIN — SODIUM CHLORIDE 250 ML: 9 INJECTION, SOLUTION INTRAVENOUS at 12:52

## 2018-01-01 RX ADMIN — SENNOSIDES 1 TABLET: 8.6 TABLET, FILM COATED ORAL at 15:02

## 2018-01-01 RX ADMIN — ACETAMINOPHEN 650 MG: 325 TABLET ORAL at 07:30

## 2018-01-01 RX ADMIN — DILTIAZEM HYDROCHLORIDE 10 MG/HR: 5 INJECTION INTRAVENOUS at 17:04

## 2018-01-01 RX ADMIN — SODIUM CHLORIDE 500 ML: 9 INJECTION, SOLUTION INTRAVENOUS at 11:58

## 2018-01-01 RX ADMIN — ERTAPENEM SODIUM 1 G: 1 INJECTION, POWDER, LYOPHILIZED, FOR SOLUTION INTRAMUSCULAR; INTRAVENOUS at 22:44

## 2018-01-01 RX ADMIN — AMLODIPINE BESYLATE 5 MG: 5 TABLET ORAL at 08:32

## 2018-01-01 RX ADMIN — INSULIN DETEMIR 16 UNITS: 100 INJECTION, SOLUTION SUBCUTANEOUS at 08:29

## 2018-01-01 RX ADMIN — SODIUM CHLORIDE: 9 INJECTION, SOLUTION INTRAVENOUS at 03:24

## 2018-01-01 RX ADMIN — DEXAMETHASONE 4 MG: 4 TABLET ORAL at 08:32

## 2018-01-01 RX ADMIN — ACYCLOVIR 400 MG: 400 TABLET ORAL at 08:58

## 2018-01-01 RX ADMIN — DEXAMETHASONE 4 MG: 4 TABLET ORAL at 09:58

## 2018-01-01 RX ADMIN — LISINOPRIL 15 MG: 10 TABLET ORAL at 17:13

## 2018-01-01 RX ADMIN — GEMFIBROZIL 600 MG: 600 TABLET ORAL at 17:26

## 2018-01-01 RX ADMIN — METOPROLOL SUCCINATE 50 MG: 50 TABLET, EXTENDED RELEASE ORAL at 08:17

## 2018-01-01 RX ADMIN — ACYCLOVIR 400 MG: 200 CAPSULE ORAL at 20:32

## 2018-01-01 RX ADMIN — CARFILZOMIB 120 MG: 60 INJECTION, POWDER, LYOPHILIZED, FOR SOLUTION INTRAVENOUS at 12:40

## 2018-01-01 RX ADMIN — FERROUS SULFATE TAB 325 MG (65 MG ELEMENTAL FE) 325 MG: 325 (65 FE) TAB at 08:32

## 2018-01-01 RX ADMIN — ERTAPENEM SODIUM 1 G: 1 INJECTION, POWDER, LYOPHILIZED, FOR SOLUTION INTRAMUSCULAR; INTRAVENOUS at 11:27

## 2018-01-01 RX ADMIN — ACYCLOVIR 400 MG: 200 CAPSULE ORAL at 08:53

## 2018-01-01 RX ADMIN — FERROUS SULFATE TAB 325 MG (65 MG ELEMENTAL FE) 325 MG: 325 (65 FE) TAB at 08:17

## 2018-01-01 RX ADMIN — QUETIAPINE FUMARATE 25 MG: 25 TABLET ORAL at 08:08

## 2018-01-01 RX ADMIN — DIPHENHYDRAMINE HYDROCHLORIDE 50 MG: 50 INJECTION, SOLUTION INTRAMUSCULAR; INTRAVENOUS at 10:08

## 2018-01-01 RX ADMIN — MEROPENEM 1 G: 1 INJECTION, POWDER, FOR SOLUTION INTRAVENOUS at 17:13

## 2018-01-01 RX ADMIN — CARFILZOMIB 97 MG: 60 INJECTION, POWDER, LYOPHILIZED, FOR SOLUTION INTRAVENOUS at 11:20

## 2018-01-01 RX ADMIN — INSULIN ASPART 11 UNITS: 100 INJECTION, SOLUTION INTRAVENOUS; SUBCUTANEOUS at 14:19

## 2018-01-01 RX ADMIN — PIPERACILLIN SODIUM,TAZOBACTAM SODIUM 4.5 G: 4; .5 INJECTION, POWDER, FOR SOLUTION INTRAVENOUS at 09:38

## 2018-01-01 RX ADMIN — SODIUM CHLORIDE 250 ML: 9 INJECTION, SOLUTION INTRAVENOUS at 09:59

## 2018-01-01 RX ADMIN — GEMFIBROZIL 600 MG: 600 TABLET ORAL at 08:32

## 2018-01-01 RX ADMIN — ACYCLOVIR 400 MG: 400 TABLET ORAL at 08:17

## 2018-01-01 RX ADMIN — DARATUMUMAB 700 MG: 100 INJECTION, SOLUTION, CONCENTRATE INTRAVENOUS at 10:16

## 2018-01-01 RX ADMIN — ACETAMINOPHEN 975 MG: 325 TABLET, FILM COATED ORAL at 22:22

## 2018-01-01 RX ADMIN — GEMFIBROZIL 600 MG: 600 TABLET ORAL at 16:37

## 2018-01-01 RX ADMIN — LEVOTHYROXINE SODIUM 25 MCG: 25 TABLET ORAL at 08:17

## 2018-01-01 RX ADMIN — FUROSEMIDE 20 MG: 20 TABLET ORAL at 08:02

## 2018-01-01 RX ADMIN — AMLODIPINE BESYLATE 5 MG: 5 TABLET ORAL at 15:30

## 2018-01-01 RX ADMIN — OXYCODONE HYDROCHLORIDE AND ACETAMINOPHEN 1000 MG: 500 TABLET ORAL at 09:43

## 2018-01-01 RX ADMIN — CARFILZOMIB 120 MG: 60 INJECTION, POWDER, LYOPHILIZED, FOR SOLUTION INTRAVENOUS at 11:58

## 2018-01-01 RX ADMIN — PIPERACILLIN SODIUM,TAZOBACTAM SODIUM 4.5 G: 4; .5 INJECTION, POWDER, FOR SOLUTION INTRAVENOUS at 14:35

## 2018-01-01 RX ADMIN — WATER 1 G: 1 INJECTION INTRAMUSCULAR; INTRAVENOUS; SUBCUTANEOUS at 12:25

## 2018-01-01 RX ADMIN — FERROUS SULFATE TAB 325 MG (65 MG ELEMENTAL FE) 325 MG: 325 (65 FE) TAB at 08:33

## 2018-01-01 RX ADMIN — LEVOTHYROXINE SODIUM 25 MCG: 25 TABLET ORAL at 06:58

## 2018-01-01 RX ADMIN — INSULIN DETEMIR 10 UNITS: 100 INJECTION, SOLUTION SUBCUTANEOUS at 08:16

## 2018-01-01 RX ADMIN — DARBEPOETIN ALFA 300 MCG: 300 INJECTION, SOLUTION INTRAVENOUS; SUBCUTANEOUS at 11:11

## 2018-01-01 RX ADMIN — AMLODIPINE BESYLATE 10 MG: 10 TABLET ORAL at 09:09

## 2018-01-01 RX ADMIN — CARFILZOMIB 97 MG: 60 INJECTION, POWDER, LYOPHILIZED, FOR SOLUTION INTRAVENOUS at 12:48

## 2018-01-01 RX ADMIN — CARFILZOMIB 97 MG: 60 INJECTION, POWDER, LYOPHILIZED, FOR SOLUTION INTRAVENOUS at 13:16

## 2018-01-01 RX ADMIN — MEROPENEM 1 G: 1 INJECTION, POWDER, FOR SOLUTION INTRAVENOUS at 05:45

## 2018-01-01 RX ADMIN — GEMFIBROZIL 600 MG: 600 TABLET ORAL at 08:19

## 2018-01-01 RX ADMIN — GEMFIBROZIL 600 MG: 600 TABLET ORAL at 21:37

## 2018-01-01 RX ADMIN — ACYCLOVIR 400 MG: 400 TABLET ORAL at 20:33

## 2018-01-01 RX ADMIN — DILTIAZEM HYDROCHLORIDE 5 MG/HR: 5 INJECTION INTRAVENOUS at 15:35

## 2018-01-01 RX ADMIN — GEMFIBROZIL 600 MG: 600 TABLET ORAL at 09:58

## 2018-01-01 RX ADMIN — LEVALBUTEROL HYDROCHLORIDE 1.25 MG: 1.25 SOLUTION, CONCENTRATE RESPIRATORY (INHALATION) at 16:19

## 2018-01-01 RX ADMIN — INSULIN ASPART 6 UNITS: 100 INJECTION, SOLUTION INTRAVENOUS; SUBCUTANEOUS at 17:44

## 2018-01-01 RX ADMIN — LEVOTHYROXINE SODIUM 25 MCG: 25 TABLET ORAL at 08:08

## 2018-01-01 RX ADMIN — Medication 12.5 MG: at 12:53

## 2018-01-01 RX ADMIN — CARFILZOMIB 97 MG: 60 INJECTION, POWDER, LYOPHILIZED, FOR SOLUTION INTRAVENOUS at 12:30

## 2018-01-01 RX ADMIN — VITAMIN D, TAB 1000IU (100/BT) 1000 UNITS: 25 TAB at 09:01

## 2018-01-01 RX ADMIN — ZOLEDRONIC ACID 3.5 MG: 0.8 INJECTION, SOLUTION, CONCENTRATE INTRAVENOUS at 11:17

## 2018-01-01 RX ADMIN — WATER 1 G: 1 INJECTION INTRAMUSCULAR; INTRAVENOUS; SUBCUTANEOUS at 11:11

## 2018-01-01 RX ADMIN — TAMSULOSIN HYDROCHLORIDE 0.4 MG: 0.4 CAPSULE ORAL at 08:32

## 2018-01-01 RX ADMIN — INSULIN ASPART 9 UNITS: 100 INJECTION, SOLUTION INTRAVENOUS; SUBCUTANEOUS at 19:06

## 2018-01-01 RX ADMIN — ACYCLOVIR 400 MG: 200 CAPSULE ORAL at 08:39

## 2018-01-01 RX ADMIN — SODIUM CHLORIDE 500 ML: 9 INJECTION, SOLUTION INTRAVENOUS at 11:23

## 2018-01-01 RX ADMIN — AMLODIPINE BESYLATE 5 MG: 5 TABLET ORAL at 08:02

## 2018-01-01 RX ADMIN — GEMFIBROZIL 600 MG: 600 TABLET ORAL at 21:35

## 2018-01-01 RX ADMIN — OXYCODONE HYDROCHLORIDE AND ACETAMINOPHEN 1000 MG: 500 TABLET ORAL at 09:19

## 2018-01-01 RX ADMIN — LEVOTHYROXINE SODIUM 25 MCG: 25 TABLET ORAL at 08:59

## 2018-01-01 RX ADMIN — LISINOPRIL 15 MG: 5 TABLET ORAL at 08:16

## 2018-01-01 RX ADMIN — FUROSEMIDE 20 MG: 20 TABLET ORAL at 08:59

## 2018-01-01 RX ADMIN — AMLODIPINE BESYLATE 5 MG: 5 TABLET ORAL at 09:19

## 2018-01-01 RX ADMIN — VITAMIN D, TAB 1000IU (100/BT) 1000 UNITS: 25 TAB at 09:09

## 2018-01-01 RX ADMIN — VANCOMYCIN HYDROCHLORIDE 1750 MG: 5 INJECTION, POWDER, LYOPHILIZED, FOR SOLUTION INTRAVENOUS at 17:54

## 2018-01-01 RX ADMIN — DEXAMETHASONE 4 MG: 4 TABLET ORAL at 09:18

## 2018-01-01 RX ADMIN — FUROSEMIDE 20 MG: 20 TABLET ORAL at 08:17

## 2018-01-01 RX ADMIN — OMEPRAZOLE 20 MG: 20 CAPSULE, DELAYED RELEASE ORAL at 08:02

## 2018-01-01 RX ADMIN — CARFILZOMIB 120 MG: 60 INJECTION, POWDER, LYOPHILIZED, FOR SOLUTION INTRAVENOUS at 12:05

## 2018-01-01 RX ADMIN — METOPROLOL SUCCINATE 50 MG: 50 TABLET, EXTENDED RELEASE ORAL at 09:19

## 2018-01-01 RX ADMIN — SODIUM CHLORIDE 250 ML: 9 INJECTION, SOLUTION INTRAVENOUS at 12:18

## 2018-01-01 RX ADMIN — LEVOTHYROXINE SODIUM 25 MCG: 25 TABLET ORAL at 09:44

## 2018-01-01 RX ADMIN — FERROUS SULFATE TAB 325 MG (65 MG ELEMENTAL FE) 325 MG: 325 (65 FE) TAB at 08:58

## 2018-01-01 RX ADMIN — METOPROLOL SUCCINATE 50 MG: 50 TABLET, EXTENDED RELEASE ORAL at 09:01

## 2018-01-01 RX ADMIN — METOPROLOL TARTRATE 25 MG: 25 TABLET ORAL at 20:33

## 2018-01-01 RX ADMIN — ACYCLOVIR 400 MG: 200 CAPSULE ORAL at 08:15

## 2018-01-01 RX ADMIN — INSULIN ASPART 1 UNITS: 100 INJECTION, SOLUTION INTRAVENOUS; SUBCUTANEOUS at 17:33

## 2018-01-01 RX ADMIN — INSULIN DETEMIR 7 UNITS: 100 INJECTION, SOLUTION SUBCUTANEOUS at 08:12

## 2018-01-01 RX ADMIN — INSULIN DETEMIR 16 UNITS: 100 INJECTION, SOLUTION SUBCUTANEOUS at 08:02

## 2018-01-01 RX ADMIN — VITAMIN D, TAB 1000IU (100/BT) 1000 UNITS: 25 TAB at 08:40

## 2018-01-01 RX ADMIN — METOPROLOL TARTRATE 25 MG: 25 TABLET ORAL at 08:32

## 2018-01-01 RX ADMIN — INSULIN DETEMIR 7 UNITS: 100 INJECTION, SOLUTION SUBCUTANEOUS at 08:58

## 2018-01-01 RX ADMIN — INSULIN ASPART 1 UNITS: 100 INJECTION, SOLUTION INTRAVENOUS; SUBCUTANEOUS at 12:53

## 2018-01-01 RX ADMIN — SODIUM CHLORIDE: 9 INJECTION, SOLUTION INTRAVENOUS at 17:12

## 2018-01-01 RX ADMIN — VANCOMYCIN HYDROCHLORIDE 2500 MG: 5 INJECTION, POWDER, LYOPHILIZED, FOR SOLUTION INTRAVENOUS at 09:12

## 2018-01-01 RX ADMIN — GEMFIBROZIL 600 MG: 600 TABLET ORAL at 21:45

## 2018-01-01 RX ADMIN — GEMFIBROZIL 600 MG: 600 TABLET ORAL at 21:34

## 2018-01-01 RX ADMIN — INSULIN ASPART 5 UNITS: 100 INJECTION, SOLUTION INTRAVENOUS; SUBCUTANEOUS at 17:38

## 2018-01-01 RX ADMIN — ACYCLOVIR 400 MG: 200 CAPSULE ORAL at 09:09

## 2018-01-01 RX ADMIN — METOPROLOL TARTRATE 25 MG: 25 TABLET ORAL at 20:38

## 2018-01-01 RX ADMIN — DILTIAZEM HYDROCHLORIDE 10 MG: 5 INJECTION INTRAVENOUS at 15:16

## 2018-01-01 RX ADMIN — ENOXAPARIN SODIUM 40 MG: 40 INJECTION SUBCUTANEOUS at 16:39

## 2018-01-01 RX ADMIN — QUETIAPINE FUMARATE 25 MG: 25 TABLET ORAL at 20:19

## 2018-01-01 RX ADMIN — FERROUS SULFATE TAB 325 MG (65 MG ELEMENTAL FE) 325 MG: 325 (65 FE) TAB at 08:53

## 2018-01-01 RX ADMIN — LEVOTHYROXINE SODIUM 25 MCG: 25 TABLET ORAL at 08:28

## 2018-01-01 RX ADMIN — DARBEPOETIN ALFA 300 MCG: 300 INJECTION, SOLUTION INTRAVENOUS; SUBCUTANEOUS at 12:23

## 2018-01-01 RX ADMIN — INSULIN DETEMIR 20 UNITS: 100 INJECTION, SOLUTION SUBCUTANEOUS at 08:52

## 2018-01-01 RX ADMIN — SODIUM CHLORIDE 250 ML: 9 INJECTION, SOLUTION INTRAVENOUS at 13:15

## 2018-01-01 RX ADMIN — HALOPERIDOL 2 MG: 2 SOLUTION ORAL at 16:00

## 2018-01-01 RX ADMIN — WATER 1 G: 1 INJECTION INTRAMUSCULAR; INTRAVENOUS; SUBCUTANEOUS at 11:45

## 2018-01-01 RX ADMIN — METOPROLOL SUCCINATE 50 MG: 50 TABLET, EXTENDED RELEASE ORAL at 08:37

## 2018-01-01 RX ADMIN — INSULIN ASPART 6 UNITS: 100 INJECTION, SOLUTION INTRAVENOUS; SUBCUTANEOUS at 12:28

## 2018-01-01 RX ADMIN — GEMFIBROZIL 600 MG: 600 TABLET ORAL at 09:19

## 2018-01-01 RX ADMIN — GEMFIBROZIL 600 MG: 600 TABLET ORAL at 08:17

## 2018-01-01 RX ADMIN — LEVOFLOXACIN 500 MG: 500 TABLET, FILM COATED ORAL at 12:31

## 2018-01-01 RX ADMIN — SODIUM CHLORIDE 500 ML: 9 INJECTION, SOLUTION INTRAVENOUS at 11:13

## 2018-01-01 RX ADMIN — ALBUTEROL SULFATE 2 PUFF: 90 AEROSOL, METERED RESPIRATORY (INHALATION) at 11:32

## 2018-01-01 RX ADMIN — WATER 1 G: 1 INJECTION INTRAMUSCULAR; INTRAVENOUS; SUBCUTANEOUS at 11:32

## 2018-01-01 RX ADMIN — AMLODIPINE BESYLATE 10 MG: 10 TABLET ORAL at 08:15

## 2018-01-01 RX ADMIN — VITAMIN D, TAB 1000IU (100/BT) 1000 UNITS: 25 TAB at 09:16

## 2018-01-01 RX ADMIN — SODIUM CHLORIDE 1000 ML: 9 INJECTION, SOLUTION INTRAVENOUS at 04:06

## 2018-01-01 RX ADMIN — BORTEZOMIB 2.8 MG: 3.5 INJECTION, POWDER, LYOPHILIZED, FOR SOLUTION INTRAVENOUS; SUBCUTANEOUS at 11:37

## 2018-01-01 RX ADMIN — ACYCLOVIR 400 MG: 400 TABLET ORAL at 08:02

## 2018-01-01 RX ADMIN — INSULIN ASPART 6 UNITS: 100 INJECTION, SOLUTION INTRAVENOUS; SUBCUTANEOUS at 17:16

## 2018-01-01 RX ADMIN — ZOLEDRONIC ACID 3.5 MG: 4 INJECTION, SOLUTION, CONCENTRATE INTRAVENOUS at 09:15

## 2018-01-01 RX ADMIN — INSULIN DETEMIR 16 UNITS: 100 INJECTION, SOLUTION SUBCUTANEOUS at 09:20

## 2018-01-01 RX ADMIN — FERROUS SULFATE TAB 325 MG (65 MG ELEMENTAL FE) 325 MG: 325 (65 FE) TAB at 09:16

## 2018-01-01 RX ADMIN — SODIUM CHLORIDE 1000 ML: 9 INJECTION, SOLUTION INTRAVENOUS at 14:13

## 2018-01-01 RX ADMIN — OMEPRAZOLE 20 MG: 20 CAPSULE, DELAYED RELEASE ORAL at 08:17

## 2018-01-01 RX ADMIN — TAMSULOSIN HYDROCHLORIDE 0.4 MG: 0.4 CAPSULE ORAL at 08:02

## 2018-01-01 RX ADMIN — VITAMIN D, TAB 1000IU (100/BT) 1000 UNITS: 25 TAB at 09:59

## 2018-01-01 RX ADMIN — AMLODIPINE BESYLATE 10 MG: 10 TABLET ORAL at 08:53

## 2018-01-01 RX ADMIN — SODIUM CHLORIDE: 9 INJECTION, SOLUTION INTRAVENOUS at 15:03

## 2018-01-01 RX ADMIN — ACYCLOVIR 400 MG: 400 TABLET ORAL at 09:17

## 2018-01-01 RX ADMIN — INSULIN DETEMIR 22 UNITS: 100 INJECTION, SOLUTION SUBCUTANEOUS at 08:27

## 2018-01-01 RX ADMIN — VITAMIN D, TAB 1000IU (100/BT) 1000 UNITS: 25 TAB at 08:59

## 2018-01-01 RX ADMIN — ALBUTEROL SULFATE 2.5 MG: 2.5 SOLUTION RESPIRATORY (INHALATION) at 11:39

## 2018-01-01 RX ADMIN — AMLODIPINE BESYLATE 10 MG: 10 TABLET ORAL at 09:01

## 2018-01-01 RX ADMIN — SODIUM CHLORIDE, PRESERVATIVE FREE: 5 INJECTION INTRAVENOUS at 18:37

## 2018-01-01 RX ADMIN — ASPIRIN 325 MG ORAL TABLET 325 MG: 325 PILL ORAL at 08:59

## 2018-01-01 RX ADMIN — ACETAMINOPHEN 650 MG: 325 TABLET ORAL at 10:13

## 2018-01-01 RX ADMIN — INSULIN ASPART 5 UNITS: 100 INJECTION, SOLUTION INTRAVENOUS; SUBCUTANEOUS at 12:30

## 2018-01-01 RX ADMIN — INSULIN DETEMIR 16 UNITS: 100 INJECTION, SOLUTION SUBCUTANEOUS at 08:34

## 2018-01-01 RX ADMIN — LEVOTHYROXINE SODIUM 25 MCG: 25 TABLET ORAL at 09:58

## 2018-01-01 RX ADMIN — METOPROLOL SUCCINATE 25 MG: 25 TABLET, EXTENDED RELEASE ORAL at 13:06

## 2018-01-01 RX ADMIN — MEROPENEM AND SODIUM CHLORIDE 1 G: 1 INJECTION, SOLUTION INTRAVENOUS at 04:47

## 2018-01-01 RX ADMIN — FUROSEMIDE 40 MG: 10 INJECTION, SOLUTION INTRAVENOUS at 16:32

## 2018-01-01 RX ADMIN — FERROUS SULFATE TAB 325 MG (65 MG ELEMENTAL FE) 325 MG: 325 (65 FE) TAB at 09:01

## 2018-01-01 RX ADMIN — AMLODIPINE BESYLATE 10 MG: 10 TABLET ORAL at 08:29

## 2018-01-01 RX ADMIN — AMLODIPINE BESYLATE 5 MG: 5 TABLET ORAL at 08:58

## 2018-01-01 RX ADMIN — METHYLPREDNISOLONE SODIUM SUCCINATE 125 MG: 125 INJECTION, POWDER, FOR SOLUTION INTRAMUSCULAR; INTRAVENOUS at 11:29

## 2018-01-01 RX ADMIN — INSULIN ASPART 4 UNITS: 100 INJECTION, SOLUTION INTRAVENOUS; SUBCUTANEOUS at 21:18

## 2018-01-01 RX ADMIN — SODIUM CHLORIDE 250 ML: 9 INJECTION, SOLUTION INTRAVENOUS at 13:19

## 2018-01-01 RX ADMIN — GEMFIBROZIL 600 MG: 600 TABLET ORAL at 08:31

## 2018-01-01 RX ADMIN — SODIUM CHLORIDE 250 ML: 9 INJECTION, SOLUTION INTRAVENOUS at 12:30

## 2018-01-01 RX ADMIN — ACYCLOVIR 400 MG: 200 CAPSULE ORAL at 20:19

## 2018-01-01 RX ADMIN — FERROUS SULFATE TAB 325 MG (65 MG ELEMENTAL FE) 325 MG: 325 (65 FE) TAB at 08:28

## 2018-01-01 RX ADMIN — SODIUM CHLORIDE 250 ML: 9 INJECTION, SOLUTION INTRAVENOUS at 11:57

## 2018-01-01 ASSESSMENT — ENCOUNTER SYMPTOMS
HEADACHES: 0
RHINORRHEA: 0
EYE PAIN: 1
FEVER: 0
PALPITATIONS: 0
NECK PAIN: 0
ABDOMINAL PAIN: 0
WEAKNESS: 1
NAUSEA: 0
HEMATURIA: 0
BRUISES/BLEEDS EASILY: 0
DYSURIA: 0
VOMITING: 0
WEAKNESS: 1
FATIGUE: 0
FATIGUE: 1
FATIGUE: 1
FEVER: 0
CHEST TIGHTNESS: 0
DIZZINESS: 1
CHEST TIGHTNESS: 1
COUGH: 0
COUGH: 0
NUMBNESS: 0
DIARRHEA: 0
LIGHT-HEADEDNESS: 1
VOMITING: 0
CHILLS: 0
BACK PAIN: 0
COLOR CHANGE: 0
PALPITATIONS: 0
DIZZINESS: 1
DYSURIA: 1
LIGHT-HEADEDNESS: 0
HEMATURIA: 1
SHORTNESS OF BREATH: 0
DIFFICULTY URINATING: 0
PALPITATIONS: 1
LIGHT-HEADEDNESS: 0
FEVER: 0
WOUND: 0
VOMITING: 0
DIFFICULTY URINATING: 0
FEVER: 1
WOUND: 0
NAUSEA: 0
TREMORS: 0
HEMATURIA: 0
ABDOMINAL PAIN: 0
NAUSEA: 0
DYSURIA: 0
PHOTOPHOBIA: 0
COUGH: 0
FREQUENCY: 0
DYSURIA: 0
WEAKNESS: 1
FREQUENCY: 1
ABDOMINAL PAIN: 0
SPEECH DIFFICULTY: 0
DIARRHEA: 0
BACK PAIN: 0
HEADACHES: 0
WEAKNESS: 0
SEIZURES: 0
BLOOD IN STOOL: 0
ABDOMINAL PAIN: 0

## 2018-01-01 ASSESSMENT — PAIN SCALES - GENERAL
PAINLEVEL: NO PAIN (0)
PAINLEVEL: MILD PAIN (2)
PAINLEVEL: MILD PAIN (2)
PAINLEVEL: NO PAIN (0)
PAINLEVEL: MILD PAIN (2)
PAINLEVEL: NO PAIN (0)
PAINLEVEL: MILD PAIN (2)
PAINLEVEL: NO PAIN (0)
PAINLEVEL: NO PAIN (1)
PAINLEVEL: NO PAIN (0)

## 2018-01-01 ASSESSMENT — ACTIVITIES OF DAILY LIVING (ADL)
SWALLOWING: 0-->SWALLOWS FOODS/LIQUIDS WITHOUT DIFFICULTY
PREVIOUS_RESPONSIBILITIES: MEAL PREP;HOUSEKEEPING;LAUNDRY;SHOPPING;MEDICATION MANAGEMENT;DRIVING
BATHING: 0-->INDEPENDENT
COGNITION: 0 - NO COGNITION ISSUES REPORTED
RETIRED_COMMUNICATION: 0-->UNDERSTANDS/COMMUNICATES WITHOUT DIFFICULTY
I_NEED_ASSISTANCE_FOR_THE_FOLLOWING_DAILY_ACTIVITIES:: NO ASSISTANCE IS NEEDED
TOILETING: 1-->ASSISTIVE EQUIPMENT
IADL_COMMENTS: USES PILLBOX
TRANSFERRING: 1-->ASSISTIVE EQUIPMENT
NUMBER_OF_TIMES_PATIENT_HAS_FALLEN_WITHIN_LAST_SIX_MONTHS: 2
AMBULATION: 1-->ASSISTIVE EQUIPMENT
PREVIOUS_RESPONSIBILITIES: MEAL PREP;HOUSEKEEPING;LAUNDRY;MEDICATION MANAGEMENT;DRIVING
FALL_HISTORY_WITHIN_LAST_SIX_MONTHS: YES
PREVIOUS_RESPONSIBILITIES: HOUSEKEEPING;LAUNDRY;MEDICATION MANAGEMENT;FINANCES;DRIVING
DRESS: 0-->INDEPENDENT
RETIRED_EATING: 0-->INDEPENDENT
CURRENT_FUNCTION: NO ASSISTANCE NEEDED
WHICH_OF_THE_ABOVE_FUNCTIONAL_RISKS_HAD_A_RECENT_ONSET_OR_CHANGE?: AMBULATION

## 2018-01-01 ASSESSMENT — PAIN DESCRIPTION - DESCRIPTORS: DESCRIPTORS: SPASM

## 2018-01-03 NOTE — MR AVS SNAPSHOT
After Visit Summary   1/3/2018    Roc Parkinson    MRN: [id number]           Patient Information     Date Of Birth          7/7/1926        Visit Information        Provider Department      1/3/2018 11:00 AM  INFUSION CHAIR 16 [facility]        Today's Diagnoses     Multiple myeloma not having achieved remission (H)    -  1       Follow-ups after your visit        Your next 10 appointments already scheduled     Jan 04, 2018 11:00 AM CST   Level 2 with  INFUSION CHAIR 4   [facility] ([facility])    [facility] [place] [id number]  [place] [address]   [phone]            Jan 08, 2018  8:30 AM CST   PHYSICAL with Dickson Reich MD   [facility] ([facility])    [address] [place] [address]   [phone]            Ye 10, 2018  9:45 AM CST   Return Visit with  Oncology Nurse   [facility] ([facility])    [facility] [place] [id number]  [place] [address]   [phone]            Ye 10, 2018 10:15 AM CST   Return Visit with Gretel Quinn MD   [facility] ([facility])    [facility]  [address]  [place] [address]   [phone]            Ye 10, 2018 10:30 AM CST   Level 2 with  INFUSION CHAIR 2   [facility] ([facility])    [facility]  [address]  [place] [address]   [phone]            Jan 11, 2018 10:30 AM CST   Level 2 with  INFUSION CHAIR 8   [facility] ([facility])    [facility]  [address]  [place] [address]   [phone]            Jan 17, 2018 10:30 AM CST   Level 2 with   "INFUSION CHAIR 2   [facility] ([facility])    [facility]  [address]  [place] [address]   [phone]            Jan 18, 2018 10:00 AM CST   Level 2 with SH INFUSION CHAIR 18   [facility] ([facility])    [facility] [place]  [address]  [place] [address]   [phone]            Jan 23, 2018 10:30 AM CST   Diabetic Education with  DIABETIC ED RESOURCE   [facility] ([facility])    [address] [id number] [phone]            Jan 31, 2018 11:45 AM CST   Return Visit with Gretel Quinn MD   [facility] ([facility])    [facility]  [address]  [place] [address] [phone]              Who to contact     If you have questions or need follow up information about today's clinic visit or your schedule please contact [facility] directly at [phone].  Normal or non-critical lab and imaging results will be communicated to you by Normalhart, letter or phone within 4 business days after the clinic has received the results. If you do not hear from us within 7 days, please contact the clinic through Normalhart or phone. If you have a critical or abnormal lab result, we will notify you by phone as soon as possible.  Submit refill requests through [employer] or call your pharmacy and they will forward the refill request to us. Please allow 3 business days for your refill to be completed.          Additional Information About Your Visit        [employer] Information     [employer] lets you send messages to your doctor, view your test results, renew your prescriptions, schedule appointments and more. To sign up, go to [email or web address] . Click on \"Log in\" on the left side of the screen, which " "will take you to the Welcome page. Then click on \"Sign up Now\" on the right side of the page.     You will be asked to enter the access code listed below, as well as some personal information. Please follow the directions to create your username and password.     Your access code is: [email or web address]  Expires: 3/26/2018 11:10 AM     Your access code will  in 90 days. If you need help or a new code, please call your [facility] clinic or [phone].        Care EveryWhere ID     This is your Care EveryWhere ID. This could be used by other organizations to access your [facility] medical records  [id number]        Your Vitals Were     Pulse Temperature Respirations Height Pulse Oximetry BMI (Body Mass Index)    66 97.9  F (36.6  C) (Oral) 16 1.778 m (5' 10\") 95% 29.3 kg/m2       Blood Pressure from Last 3 Encounters:   18 152/75   17 138/68   17 121/58    Weight from Last 3 Encounters:   18 92.6 kg (204 lb 3.2 oz)   17 93.9 kg (207 lb)   17 92.4 kg (203 lb 9.6 oz)              We Performed the Following     CBC with platelets differential     Comprehensive metabolic panel     Kappa and lambda light chain     Protein electrophoresis     Protein Immunofixation Serum        Primary Care Provider Office Phone # Fax #    Dickson Clark Reich [phone] [address] [facility] [phone]        Equal Access to Services     SANDRA GAGNON AH: Hadii [place] ku hadasho Soomaali, waaxda luqadaha, qaybta kaalmada adeegyahanh, melita shaw . So [facility] [phone].    ATENCIÓN: Si habla español, tiene a nolan disposición servicios gratuitos de asistencia lingüística. Llame al [phone].    We comply with applicable federal civil rights laws and [place] laws. We do not discriminate on the basis of race, color, national origin, age, disability, sex, sexual orientation, or gender identity.            Thank you!     Thank you for choosing [facility]  for your care. Our goal is always to provide you with excellent care. Hearing back from our patients is one way we can continue to improve our services. Please take a few minutes to complete the written survey that you may receive in the mail after your visit with us. Thank you!             Your Updated Medication List - Protect others around you: Learn how to safely use, store and throw away your medicines at www.disposemymeds.org.          This list is accurate as of: 1/3/18  2:14 PM.  Always use your most recent med list.                   Brand Name Dispense Instructions for use Diagnosis    * acyclovir 400 MG tablet    ZOVIRAX    60 tablet    Take 1 tablet (400 mg) by mouth 2 times daily Viral Prophylaxis.    Multiple myeloma not having achieved remission (H)       * acyclovir 400 MG tablet    ZOVIRAX    60 tablet    Take 1 tablet (400 mg) by mouth 2 times daily Viral Prophylaxis.    Multiple myeloma not having achieved remission (H)       amLODIPine 5 MG tablet    NORVASC    30 tablet    TAKE 1 TABLET BY MOUTH DAILY    Essential hypertension, benign       * B-D U/F 31G X 8 MM   Generic drug:  insulin pen needle           * B-D U/F 31G X 8 MM   Generic drug:  insulin pen needle     500 each    USE 5 PEN NEEDLES PER DAY OR AS DIRECTED    Type 2 diabetes mellitus with stage 3 chronic kidney disease, with long-term current use of insulin (H)       dexamethasone 4 MG tablet    DECADRON    40 tablet    Take 5 tablets (20 mg) by mouth with food. Days 1, 2, 8, 9, 15, 16, 22, and 23. On carfilzomib days, take prior to carfilzomib dose.    Multiple myeloma not having achieved remission (H)       ferrous sulfate 325 (65 FE) MG tablet    IRON     Take 325 mg by mouth daily (with breakfast)        FREESTYLE LITE test strip   Generic drug:  blood glucose monitoring     200 strip    USE 1 STRIP TO TEST TWICE DAILY.    Uncontrolled type 1 diabetes mellitus with stage 3 chronic kidney disease  (H)       furosemide 20 MG tablet    LASIX    30 tablet    Take 1 tablet (20 mg) by mouth daily    Multiple myeloma not having achieved remission (H)       gemfibrozil 600 MG tablet    LOPID    180 tablet    TAKE 1 TABLET BY MOUTH TWICE DAILY    Hyperlipidemia       glipiZIDE 10 MG tablet    GLUCOTROL    180 tablet    Take 1 tablet (10 mg) by mouth daily (with breakfast)    Type 2 diabetes mellitus with stage 3 chronic kidney disease, with long-term current use of insulin (H), Uncontrolled type 2 diabetes mellitus with hyperglycemia, unspecified long term insulin use status (H), Type 2 diabetes mellitus with diabetic chronic kidney disease, unspecified CKD stage, unspecified long term insulin use status (H)       * insulin aspart 100 UNIT/ML injection    NovoLOG FLEXPEN    15 mL    Inject 4-17 Units Subcutaneous 4 times daily (with meals and nightly)    Uncontrolled type 2 diabetes mellitus with hyperglycemia, unspecified long term insulin use status (H), Type 2 diabetes mellitus with diabetic chronic kidney disease, unspecified CKD stage, unspecified long term insulin use status (H), Type 2 diabetes mellitus with stage 3 chronic kidney disease, with long-term current use of insulin (H)       * insulin aspart 100 UNIT/ML injection    NovoLOG PEN    12 mL    Inject 13 units before breakfast, 11 units before lunch, 9 units before dinner, and 4 units at bedtime. Before meal correction: 140-175 add 1 unit  176-210 add 2 units  211-245 add 3 units  246-280 add 4 units  281-315 add 5 units 316-350 add 6 units  351-385 add 7 units 386-420 add 8 units  Over 420 add 9 units    Uncontrolled type 2 diabetes mellitus with hyperglycemia, unspecified long term insulin use status (H), Type 2 diabetes mellitus with diabetic chronic kidney disease, unspecified CKD stage, unspecified long term insulin use status (H), Type 2 diabetes mellitus with stage 3 chronic kidney disease, with long-term current use of insulin (H)       insulin  detemir 100 UNIT/ML injection    LEVEMIR FLEXPEN/FLEXTOUCH    15 mL    Inject 16 Units Subcutaneous every morning (before breakfast) On Wednesday and Thursday morning, inject 18 units.    Type 2 diabetes mellitus with diabetic chronic kidney disease, unspecified CKD stage, unspecified long term insulin use status (H), Uncontrolled type 2 diabetes mellitus with hyperglycemia, unspecified long term insulin use status (H), Type 2 diabetes mellitus with stage 3 chronic kidney disease, with long-term current use of insulin (H)       levothyroxine 25 MCG tablet    SYNTHROID/LEVOTHROID    90 tablet    TAKE 1 TABLET BY MOUTH EVERY DAY    Acquired hypothyroidism       * lisinopril 30 MG tablet    PRINIVIL,ZESTRIL    90 tablet    TAKE 1 TABLET BY MOUTH EVERY DAY    Essential hypertension, benign       * lisinopril 30 MG tablet    PRINIVIL,ZESTRIL    90 tablet    Take 0.5 tablets (15 mg) by mouth daily    Essential hypertension, benign       LORazepam 0.5 MG tablet    ATIVAN    30 tablet    Take 1 tablet (0.5 mg) by mouth every 4 hours as needed (Anxiety, Nausea/Vomiting or Sleep)    Multiple myeloma not having achieved remission (H)       metoprolol 25 MG tablet    LOPRESSOR    180 tablet    TAKE 1 TABLET BY MOUTH TWICE DAILY    Essential hypertension       omeprazole 20 MG CR capsule    priLOSEC    90 capsule    TAKE 1 CAPSULE BY MOUTH EVERY DAY    Congenital hiatus hernia       ondansetron 8 MG tablet    ZOFRAN    10 tablet    Take 1 tablet (8 mg) by mouth every 8 hours as needed (Nausea/Vomiting)    Multiple myeloma not having achieved remission (H)       polyethylene glycol powder    MIRALAX/GLYCOLAX          prochlorperazine 10 MG tablet    COMPAZINE    30 tablet    Take 1 tablet (10 mg) by mouth every 6 hours as needed (Nausea/Vomiting)    Multiple myeloma not having achieved remission (H)       Selenium 200 MCG Tabs      Take 100 mcg by mouth daily. Pt takes one half tab of the 200 mcg daily        tamsulosin 0.4 MG  capsule    FLOMAX    90 capsule    TAKE 1 CAPSULE BY MOUTH DAILY    Malignant neoplasm of prostate (H)       VITAMIN C PO      Take 1,000 mg by mouth daily        VITAMIN D3 PO      Take 1,000 Units by mouth daily        * Notice:  This list has 8 medication(s) that are the same as other medications prescribed for you. Read the directions carefully, and ask your doctor or other care provider to review them with you.

## 2018-01-03 NOTE — MR AVS SNAPSHOT
After Visit Summary   1/3/2018    Roc Parkinson    MRN: [id number]           Patient Information     Date Of Birth          7/7/1926        Visit Information        Provider Department      1/3/2018 9:01 AM Larissa Obando LICSW [facility]        Today's Diagnoses     Counseling NOS(V65.40)    -  1       Follow-ups after your visit        Your next 10 appointments already scheduled     Jan 04, 2018 11:00 AM CST   Level 2 with  INFUSION CHAIR 4   [facility] ([facility])    [facility] [place] [id number]  [place] [address]   [phone]            Jan 08, 2018  8:30 AM CST   PHYSICAL with Dickson Reich MD   [facility])    [address] [place] [address]   [phone]            Ye 10, 2018  9:45 AM CST   Return Visit with  Oncology Nurse   [facility] ([facility])    [facility] [place] [id number]  [place] [address]   [phone]            Ye 10, 2018 10:15 AM CST   Return Visit with Gretel Quinn MD   [facility] ([facility])    [facility]  [address]  [place] [address]   [phone]            Ye 10, 2018 10:30 AM CST   Level 2 with  INFUSION CHAIR 2   [facility] ([facility])    [facility]  [address]  [place] [address]   [phone]            Jan 11, 2018 10:30 AM CST   Level 2 with  INFUSION CHAIR 8   [facility] ([facility])    [facility]  [address]  [place] [address]   [phone]            Jan 17, 2018 10:30 AM CST   Level 2 with  INFUSION CHAIR 2   [facility]  "[facility] Center ([facility])    [facility] [place]  [address]  [place] [address]   [phone]            Jan 18, 2018 10:00 AM CST   Level 2 with  INFUSION CHAIR 18   [facility] ([facility])    [facility] [place]  [address]  [facility] [address]   [phone]            Jan 23, 2018 10:30 AM CST   Diabetic Education with  DIABETIC ED RESOURCE   [facility]    [address] [id number] [phone]            Jan 31, 2018 11:45 AM CST   Return Visit with Gretel Quinn MD   [facility] ([facility])    Critical access hospital [facility]  [address]  [facility] [address] [phone]              Who to contact     If you have questions or need follow up information about today's clinic visit or your schedule please contact [facility] directly at [phone].  Normal or non-critical lab and imaging results will be communicated to you by [employer], letter or phone within 4 business days after the clinic has received the results. If you do not hear from us within 7 days, please contact the clinic through [employer] or phone. If you have a critical or abnormal lab result, we will notify you by phone as soon as possible.  Submit refill requests through [employer] or call your pharmacy and they will forward the refill request to us. Please allow 3 business days for your refill to be completed.          Additional Information About Your Visit        [employer] Information     [employer] lets you send messages to your doctor, view your test results, renew your prescriptions, schedule appointments and more. To sign up, go to [email or web address] . Click on \"Log in\" on the left side of the screen, which will take you to the Welcome page. Then click on \"Sign up Now\" on " the right side of the page.     You will be asked to enter the access code listed below, as well as some personal information. Please follow the directions to create your username and password.     Your access code is: [email or web address]  Expires: 3/26/2018 11:10 AM     Your access code will  in 90 days. If you need help or a new code, please call your [facility] or [phone].        Care EveryWhere ID     This is your Care EveryWhere ID. This could be used by other organizations to access your [facility] medical records  [id number]         Blood Pressure from Last 3 Encounters:   18 152/75   17 138/68   17 121/58    Weight from Last 3 Encounters:   18 92.6 kg (204 lb 3.2 oz)   17 93.9 kg (207 lb)   17 92.4 kg (203 lb 9.6 oz)              Today, you had the following     No orders found for display       Primary Care Provider Office Phone # Fax #    Dickson Reich [phone]       [address] [facility] [address]        Equal Access to Services     SANDRA GAGNON : Hadii virginia wilson hadasho Somaeali, waaxda luqadaha, qaybta kaalmada adeegyada, melita rodriguez. So [facility] [phone].    ATENCIÓN: Si habla español, tiene a nolan disposición servicios gratuitos de asistencia lingüística. Willyame al [phone].    We comply with applicable federal civil rights laws and [place] laws. We do not discriminate on the basis of race, color, national origin, age, disability, sex, sexual orientation, or gender identity.            Thank you!     Thank you for choosing [facility]  for your care. Our goal is always to provide you with excellent care. Hearing back from our patients is one way we can continue to improve our services. Please take a few minutes to complete the written survey that you may receive in the mail after your visit with us. Thank you!             Your Updated Medication List - Protect others around you:  Learn how to safely use, store and throw away your medicines at www.disposemymeds.org.          This list is accurate as of: 1/3/18 11:59 PM.  Always use your most recent med list.                   Brand Name Dispense Instructions for use Diagnosis    * acyclovir 400 MG tablet    ZOVIRAX    60 tablet    Take 1 tablet (400 mg) by mouth 2 times daily Viral Prophylaxis.    Multiple myeloma not having achieved remission (H)       * acyclovir 400 MG tablet    ZOVIRAX    60 tablet    Take 1 tablet (400 mg) by mouth 2 times daily Viral Prophylaxis.    Multiple myeloma not having achieved remission (H)       amLODIPine 5 MG tablet    NORVASC    30 tablet    TAKE 1 TABLET BY MOUTH DAILY    Essential hypertension, benign       * B-D U/F 31G X 8 MM   Generic drug:  insulin pen needle           * B-D U/F 31G X 8 MM   Generic drug:  insulin pen needle     500 each    USE 5 PEN NEEDLES PER DAY OR AS DIRECTED    Type 2 diabetes mellitus with stage 3 chronic kidney disease, with long-term current use of insulin (H)       dexamethasone 4 MG tablet    DECADRON    40 tablet    Take 5 tablets (20 mg) by mouth with food. Days 1, 2, 8, 9, 15, 16, 22, and 23. On carfilzomib days, take prior to carfilzomib dose.    Multiple myeloma not having achieved remission (H)       ferrous sulfate 325 (65 FE) MG tablet    IRON     Take 325 mg by mouth daily (with breakfast)        FREESTYLE LITE test strip   Generic drug:  blood glucose monitoring     200 strip    USE 1 STRIP TO TEST TWICE DAILY.    Uncontrolled type 1 diabetes mellitus with stage 3 chronic kidney disease (H)       furosemide 20 MG tablet    LASIX    30 tablet    Take 1 tablet (20 mg) by mouth daily    Multiple myeloma not having achieved remission (H)       gemfibrozil 600 MG tablet    LOPID    180 tablet    TAKE 1 TABLET BY MOUTH TWICE DAILY    Hyperlipidemia       glipiZIDE 10 MG tablet    GLUCOTROL    180 tablet    Take 1 tablet (10 mg) by mouth daily (with breakfast)    Type 2  diabetes mellitus with stage 3 chronic kidney disease, with long-term current use of insulin (H), Uncontrolled type 2 diabetes mellitus with hyperglycemia, unspecified long term insulin use status (H), Type 2 diabetes mellitus with diabetic chronic kidney disease, unspecified CKD stage, unspecified long term insulin use status (H)       * insulin aspart 100 UNIT/ML injection    NovoLOG FLEXPEN    15 mL    Inject 4-17 Units Subcutaneous 4 times daily (with meals and nightly)    Uncontrolled type 2 diabetes mellitus with hyperglycemia, unspecified long term insulin use status (H), Type 2 diabetes mellitus with diabetic chronic kidney disease, unspecified CKD stage, unspecified long term insulin use status (H), Type 2 diabetes mellitus with stage 3 chronic kidney disease, with long-term current use of insulin (H)       * insulin aspart 100 UNIT/ML injection    NovoLOG PEN    12 mL    Inject 13 units before breakfast, 11 units before lunch, 9 units before dinner, and 4 units at bedtime. Before meal correction: 140-175 add 1 unit  176-210 add 2 units  211-245 add 3 units  246-280 add 4 units  281-315 add 5 units 316-350 add 6 units  351-385 add 7 units 386-420 add 8 units  Over 420 add 9 units    Uncontrolled type 2 diabetes mellitus with hyperglycemia, unspecified long term insulin use status (H), Type 2 diabetes mellitus with diabetic chronic kidney disease, unspecified CKD stage, unspecified long term insulin use status (H), Type 2 diabetes mellitus with stage 3 chronic kidney disease, with long-term current use of insulin (H)       insulin detemir 100 UNIT/ML injection    LEVEMIR FLEXPEN/FLEXTOUCH    15 mL    Inject 16 Units Subcutaneous every morning (before breakfast) On Wednesday and Thursday morning, inject 18 units.    Type 2 diabetes mellitus with diabetic chronic kidney disease, unspecified CKD stage, unspecified long term insulin use status (H), Uncontrolled type 2 diabetes mellitus with hyperglycemia,  unspecified long term insulin use status (H), Type 2 diabetes mellitus with stage 3 chronic kidney disease, with long-term current use of insulin (H)       levothyroxine 25 MCG tablet    SYNTHROID/LEVOTHROID    90 tablet    TAKE 1 TABLET BY MOUTH EVERY DAY    Acquired hypothyroidism       * lisinopril 30 MG tablet    PRINIVIL,ZESTRIL    90 tablet    TAKE 1 TABLET BY MOUTH EVERY DAY    Essential hypertension, benign       * lisinopril 30 MG tablet    PRINIVIL,ZESTRIL    90 tablet    Take 0.5 tablets (15 mg) by mouth daily    Essential hypertension, benign       LORazepam 0.5 MG tablet    ATIVAN    30 tablet    Take 1 tablet (0.5 mg) by mouth every 4 hours as needed (Anxiety, Nausea/Vomiting or Sleep)    Multiple myeloma not having achieved remission (H)       metoprolol 25 MG tablet    LOPRESSOR    180 tablet    TAKE 1 TABLET BY MOUTH TWICE DAILY    Essential hypertension       omeprazole 20 MG CR capsule    priLOSEC    90 capsule    TAKE 1 CAPSULE BY MOUTH EVERY DAY    Congenital hiatus hernia       ondansetron 8 MG tablet    ZOFRAN    10 tablet    Take 1 tablet (8 mg) by mouth every 8 hours as needed (Nausea/Vomiting)    Multiple myeloma not having achieved remission (H)       polyethylene glycol powder    MIRALAX/GLYCOLAX          prochlorperazine 10 MG tablet    COMPAZINE    30 tablet    Take 1 tablet (10 mg) by mouth every 6 hours as needed (Nausea/Vomiting)    Multiple myeloma not having achieved remission (H)       Selenium 200 MCG Tabs      Take 100 mcg by mouth daily. Pt takes one half tab of the 200 mcg daily        tamsulosin 0.4 MG capsule    FLOMAX    90 capsule    TAKE 1 CAPSULE BY MOUTH DAILY    Malignant neoplasm of prostate (H)       VITAMIN C PO      Take 1,000 mg by mouth daily        VITAMIN D3 PO      Take 1,000 Units by mouth daily        * Notice:  This list has 8 medication(s) that are the same as other medications prescribed for you. Read the directions carefully, and ask your doctor or other  care provider to review them with you.

## 2018-01-04 NOTE — PROGRESS NOTES
ONCOLOGY SOCIAL WORK  INITIAL PSYCHOSOCIAL ASSESSMENT    Assessment completed of living situation, support system, financial status, functional status, coping, stressors, need for resources and social work intervention provided as needed.    Date of Assessment: 1/3/2018    Presenting Information: Mr. Roc Parkinson comes to clinic today accompanied by his son Alfredo for C1D1 Kyprolis. Pt initially diagnosed with kappa light chain IgM multiple myeloma 2016 and has been followed by Dr. Quinn in our clinic since that time.     Permanent Address:  [facility] [address] [address]    This building is a senior building. Pt reports that he feels close to a number of the other residents and that there are occasional meals available if he would wish to participate.    Decision Making: Self    Health Care Directive: Provided Copy, Patient considering completing and Provided education. Pt reports today that he would like his brother to be first point of contact as he is so close, and would prefer oldest son Bam to be alternate. Pt indicated that he is not interested in completing documentation today, but will bring it home.     Relationship Status of Patient: , pt's wife  in     Special Needs: Pt is hard of hearing and does not currently have hearing aids due to concern for cost    Family/Support System: Siblings and Children. Pt's twin brother lives nearby, and he sees him frequently. Pt's 3 sons (Bam, Alfredo & ___) live out of state, although they are always happy to shift their schedule around to be available for pt when in need.     Transportation: Private Car    Insurance: No Insurance issues identified    Sources of Income: SSI    Employment: Pt is retired from work as a     Financial Concerns: Pt reports that he does not have financial concerns at this time    Mental Health: No mental health issues identified      Chemical Use: No issues identified    Legal  Concerns/Involvement: N/A    Recreation/Leisure Interests: Pt reports that he enjoys playing cards with his twin brother (who lives locally) and a few close friends. Pt very engaged in his [employer] community and reports that his galo has been a central component in his coping. Pt reports that he occasionally enjoys going out to restaurants.    /Social Service Providers: No other SW involvement      History/Benefits: Pt is a [place], having served in the [employer]. [facility]    Trauma/Loss/Abuse History: Pt's wife's death in 2007    Spirituality: Patient identifies with galo community. Pt reports that he is Sikh. Please see previous  notes for more detailed galo history.    Current Coping Strategies: approachable, responsive and interactive    Assessment and Recommendations for Team:   Mr. Parkinson is a delightful gentleman who engages easily with this clinician surrounding his coping and concerns. Pt acknowledges that overall he feels that he is coping well with diagnosis and treatment. Pt reports some concerns today about meal prep and home cleaning, but otherwise reports that he is independent in ADLs/IADLs. This clinician made recommendation for pt to consider accessing community support from [employer] [facility] program, and also discussed [employer] with pt for meal delivery. This clinician also discussed Living With Cancer Support Group that meets once a month with pt, as pt appears to benefit from social connection. This clinician also provided pt with information about the [employer].     Interventions:   Assessment of oncologic specific issues ?   Introduction of oncologic clinical social work interventions   Adjustment to illness counseling   Advanced care planning   Facilitation of processing of thoughts/feelings   Family communication facilitated   Resource referral    Follow-Up Planned:   1) This clinician will reach out to  [facility] to explore how we might be able to support pt's acquiring a hearing aid at a lower cost  2) This clinician will continue to follow and support pt and family in ongoing way as pt continues to adjust to treatment and realize its impacts  3) Prior Authorization speciality to meet with pt today and discuss expected out of pocket costs as this appears to be of some concern with new medication  4) [facility] currently does not have funding open for multiple myeloma, but this clinician can continue to explore options for funding assistance if finances are of concern to pt  5) This clinician will send in [employer] application today per pt's request    Please page this clinician in the case of psychosocial distress or need    WESLEY Negro, LICSW  Phone: [phone]  Pager: [phone]    [place]: M, T  *every other Thursday, 8am-4:30pm  [place]: W, F, *every other Thursday, 8am-4:30pm

## 2018-01-04 NOTE — PROGRESS NOTES
Infusion Nursing Note:  Roc Parkinson presents today for C1D2 Kyprolis, possible Aranesp.  Patient seen by provider today: No   present during visit today: Not Applicable.    Note: teaching done with patient and son re port a cath. Pt feels he would like to get one placed. Will pass on to provider.     Intravenous Access:  Peripheral IV placed.    Treatment Conditions:  Not Applicable.      Post Infusion Assessment:  Patient tolerated infusion without incident.  Site patent and intact, free from redness, edema or discomfort.  No evidence of extravasations.  Access discontinued per protocol.    Discharge Plan:   Discharge instructions reviewed with: Patient and Family.  Patient and/or family verbalized understanding of discharge instructions and all questions answered.  Copy of AVS reviewed with patient and/or family.  Patient will return next Wednesday for next appointment.  Patient discharged in stable condition accompanied by: son.  Departure Mode: Ambulatory.    Keya Arias RN

## 2018-01-04 NOTE — MR AVS SNAPSHOT
After Visit Summary   1/4/2018    Roc Parkinson    MRN: [id number]           Patient Information     Date Of Birth          7/7/1926        Visit Information        Provider Department      1/4/2018 11:00 AM SH INFUSION CHAIR 4 [facility]        Today's Diagnoses     Multiple myeloma not having achieved remission (H)    -  1       Follow-ups after your visit        Your next 10 appointments already scheduled     Jan 08, 2018  8:30 AM CST   [facility]   [facility]    [address] [place] [address]   [phone]            Ye 10, 2018  9:45 AM CST   Return Visit with  Oncology Nurse   [facility] ([facility])    [facility]  [address]  [place] [address]   [phone]            Ye 10, 2018 10:15 AM CST   Return Visit with Gretel Quinn MD   [facility] ([facility])    [facility]  [address]  [place] [address]   [phone]            Ye 10, 2018 10:30 AM CST   Level 2 with  INFUSION CHAIR 2   [facility] ([facility])    [facility]  [address]  [place] [address]   [phone]            Jan 11, 2018 10:30 AM CST   Level 2 with  INFUSION CHAIR 8   [facility] ([facility])    [facility]  [address]  [place] [address]   [phone]            Jan 17, 2018 10:30 AM CST   Level 2 with  INFUSION CHAIR 2   [facility] ([facility])    [facility]  [address]  [place] [address]   [phone]            Jan 18, 2018 10:00 AM CST   Level 2 with SH  "INFUSION CHAIR 18   [facility] ([facility])    [place] [id number]  [place] [address]   [phone]            Jan 23, 2018 10:30 AM CST   Diabetic Education with  DIABETIC ED RESOURCE   [facility] ([facility])    [address] [address] [phone]            Jan 31, 2018 11:30 AM CST   Level 1 with  INFUSION CHAIR 16   [facility] ([facility])    [place] [id number]  [facility] [address]   [phone]            Jan 31, 2018 11:45 AM CST   Return Visit with Gretel Quinn MD   [facility] ([facility])    [place] [id number]  [facility] [address] [phone]              Who to contact     If you have questions or need follow up information about today's clinic visit or your schedule please contact [facility] directly at [phone].  Normal or non-critical lab and imaging results will be communicated to you by Mybandstockhart, letter or phone within 4 business days after the clinic has received the results. If you do not hear from us within 7 days, please contact the clinic through Mybandstockhart or phone. If you have a critical or abnormal lab result, we will notify you by phone as soon as possible.  Submit refill requests through FluxDrive or call your pharmacy and they will forward the refill request to us. Please allow 3 business days for your refill to be completed.          Additional Information About Your Visit        FluxDrive Information     FluxDrive lets you send messages to your doctor, view your test results, renew your prescriptions, schedule appointments and more. To sign up, go to [email or web address] . Click on \"Log in\" on the left side of the screen, which " "will take you to the Welcome page. Then click on \"Sign up Now\" on the right side of the page.     You will be asked to enter the access code listed below, as well as some personal information. Please follow the directions to create your username and password.     Your access code is: [email or web address]  Expires: 3/26/2018 11:10 AM     Your access code will  in 90 days. If you need help or a new code, please call your [facility] or [phone].        Care EveryWhere ID     This is your Care EveryWhere ID. This could be used by other organizations to access your [place] medical records  [id number]         Blood Pressure from Last 3 Encounters:   18 152/75   17 138/68   17 121/58    Weight from Last 3 Encounters:   18 92.6 kg (204 lb 3.2 oz)   17 93.9 kg (207 lb)   17 92.4 kg (203 lb 9.6 oz)              Today, you had the following     No orders found for display       Primary Care Provider Office Phone # Fax #    Dickson Reich [phone] [address] [facility] [address]        Equal Access to Services     ELIE GAGNON : Hadii [place] ku hadasho Soomaali, waaxda luqadaha, qaybta kaalmada adeegyada, melita shaw . So [facility] [phone].    ATENCIÓN: Si habla español, tiene a nolan disposición servicios gratuitos de asistencia lingüística. [facility] [phone].    We comply with applicable federal civil rights laws and [place] laws. We do not discriminate on the basis of race, color, national origin, age, disability, sex, sexual orientation, or gender identity.            Thank you!     Thank you for choosing [facility]  for your care. Our goal is always to provide you with excellent care. Hearing back from our patients is one way we can continue to improve our services. Please take a few minutes to complete the written survey that you may receive in the mail after your visit with " us. Thank you!             Your Updated Medication List - Protect others around you: Learn how to safely use, store and throw away your medicines at www.disposemymeds.org.          This list is accurate as of: 1/4/18 11:29 AM.  Always use your most recent med list.                   Brand Name Dispense Instructions for use Diagnosis    * acyclovir 400 MG tablet    ZOVIRAX    60 tablet    Take 1 tablet (400 mg) by mouth 2 times daily Viral Prophylaxis.    Multiple myeloma not having achieved remission (H)       * acyclovir 400 MG tablet    ZOVIRAX    60 tablet    Take 1 tablet (400 mg) by mouth 2 times daily Viral Prophylaxis.    Multiple myeloma not having achieved remission (H)       amLODIPine 5 MG tablet    NORVASC    30 tablet    TAKE 1 TABLET BY MOUTH DAILY    Essential hypertension, benign       * B-D U/F 31G X 8 MM   Generic drug:  insulin pen needle           * B-D U/F 31G X 8 MM   Generic drug:  insulin pen needle     500 each    USE 5 PEN NEEDLES PER DAY OR AS DIRECTED    Type 2 diabetes mellitus with stage 3 chronic kidney disease, with long-term current use of insulin (H)       dexamethasone 4 MG tablet    DECADRON    40 tablet    Take 5 tablets (20 mg) by mouth with food. Days 1, 2, 8, 9, 15, 16, 22, and 23. On carfilzomib days, take prior to carfilzomib dose.    Multiple myeloma not having achieved remission (H)       ferrous sulfate 325 (65 FE) MG tablet    IRON     Take 325 mg by mouth daily (with breakfast)        FREESTYLE LITE test strip   Generic drug:  blood glucose monitoring     200 strip    USE 1 STRIP TO TEST TWICE DAILY.    Uncontrolled type 1 diabetes mellitus with stage 3 chronic kidney disease (H)       furosemide 20 MG tablet    LASIX    30 tablet    Take 1 tablet (20 mg) by mouth daily    Multiple myeloma not having achieved remission (H)       gemfibrozil 600 MG tablet    LOPID    180 tablet    TAKE 1 TABLET BY MOUTH TWICE DAILY    Hyperlipidemia       glipiZIDE 10 MG tablet     GLUCOTROL    180 tablet    Take 1 tablet (10 mg) by mouth daily (with breakfast)    Type 2 diabetes mellitus with stage 3 chronic kidney disease, with long-term current use of insulin (H), Uncontrolled type 2 diabetes mellitus with hyperglycemia, unspecified long term insulin use status (H), Type 2 diabetes mellitus with diabetic chronic kidney disease, unspecified CKD stage, unspecified long term insulin use status (H)       * insulin aspart 100 UNIT/ML injection    NovoLOG FLEXPEN    15 mL    Inject 4-17 Units Subcutaneous 4 times daily (with meals and nightly)    Uncontrolled type 2 diabetes mellitus with hyperglycemia, unspecified long term insulin use status (H), Type 2 diabetes mellitus with diabetic chronic kidney disease, unspecified CKD stage, unspecified long term insulin use status (H), Type 2 diabetes mellitus with stage 3 chronic kidney disease, with long-term current use of insulin (H)       * insulin aspart 100 UNIT/ML injection    NovoLOG PEN    12 mL    Inject 13 units before breakfast, 11 units before lunch, 9 units before dinner, and 4 units at bedtime. Before meal correction: 140-175 add 1 unit  176-210 add 2 units  211-245 add 3 units  246-280 add 4 units  281-315 add 5 units 316-350 add 6 units  351-385 add 7 units 386-420 add 8 units  Over 420 add 9 units    Uncontrolled type 2 diabetes mellitus with hyperglycemia, unspecified long term insulin use status (H), Type 2 diabetes mellitus with diabetic chronic kidney disease, unspecified CKD stage, unspecified long term insulin use status (H), Type 2 diabetes mellitus with stage 3 chronic kidney disease, with long-term current use of insulin (H)       insulin detemir 100 UNIT/ML injection    LEVEMIR FLEXPEN/FLEXTOUCH    15 mL    Inject 16 Units Subcutaneous every morning (before breakfast) On Wednesday and Thursday morning, inject 18 units.    Type 2 diabetes mellitus with diabetic chronic kidney disease, unspecified CKD stage, unspecified long  term insulin use status (H), Uncontrolled type 2 diabetes mellitus with hyperglycemia, unspecified long term insulin use status (H), Type 2 diabetes mellitus with stage 3 chronic kidney disease, with long-term current use of insulin (H)       levothyroxine 25 MCG tablet    SYNTHROID/LEVOTHROID    90 tablet    TAKE 1 TABLET BY MOUTH EVERY DAY    Acquired hypothyroidism       * lisinopril 30 MG tablet    PRINIVIL,ZESTRIL    90 tablet    TAKE 1 TABLET BY MOUTH EVERY DAY    Essential hypertension, benign       * lisinopril 30 MG tablet    PRINIVIL,ZESTRIL    90 tablet    Take 0.5 tablets (15 mg) by mouth daily    Essential hypertension, benign       LORazepam 0.5 MG tablet    ATIVAN    30 tablet    Take 1 tablet (0.5 mg) by mouth every 4 hours as needed (Anxiety, Nausea/Vomiting or Sleep)    Multiple myeloma not having achieved remission (H)       metoprolol 25 MG tablet    LOPRESSOR    180 tablet    TAKE 1 TABLET BY MOUTH TWICE DAILY    Essential hypertension       omeprazole 20 MG CR capsule    priLOSEC    90 capsule    TAKE 1 CAPSULE BY MOUTH EVERY DAY    Congenital hiatus hernia       ondansetron 8 MG tablet    ZOFRAN    10 tablet    Take 1 tablet (8 mg) by mouth every 8 hours as needed (Nausea/Vomiting)    Multiple myeloma not having achieved remission (H)       polyethylene glycol powder    MIRALAX/GLYCOLAX          prochlorperazine 10 MG tablet    COMPAZINE    30 tablet    Take 1 tablet (10 mg) by mouth every 6 hours as needed (Nausea/Vomiting)    Multiple myeloma not having achieved remission (H)       Selenium 200 MCG Tabs      Take 100 mcg by mouth daily. Pt takes one half tab of the 200 mcg daily        tamsulosin 0.4 MG capsule    FLOMAX    90 capsule    TAKE 1 CAPSULE BY MOUTH DAILY    Malignant neoplasm of prostate (H)       VITAMIN C PO      Take 1,000 mg by mouth daily        VITAMIN D3 PO      Take 1,000 Units by mouth daily        * Notice:  This list has 8 medication(s) that are the same as other  medications prescribed for you. Read the directions carefully, and ask your doctor or other care provider to review them with you.

## 2018-01-05 NOTE — TELEPHONE ENCOUNTER
Received a message from RN:  Good morning,     I am sending an update on Mr. Parkinson.   He was previously taking 40mg decadron once weekly with an oral chemotherapy. This regimen was discontinued on 12/27/17.     Today he will be starting an iv chemotherapy 2 days per week for 3 weeks/ 1 week off.   His oral decadron will be changed to 20mg on treatment days ( 2 days/week- wed, Thursday.) This will continue with his 1 week off.   I spoke with him yesterday and he was concerned about this and wanted me to send an update.     I spoke to Roc on the phone, reassured him I heard from Jeanie the nurse, and that he should continue current diabetes routine as we discussed when we last met.  He voices agreement.   Tells me he has had 2 IV treatments and did have a BG of 485, very high. But, then down to 200's.  Reminded him we will meet on the 23rd of January, and he can call if needs or questions arise. He verbalizes understanding.  Kalyn Almanzar RD, LD, CDE

## 2018-01-05 NOTE — TELEPHONE ENCOUNTER
I called patient to follow up on his first cycle of Krypolis received on 1/3 & 1/4. He states he is doing well no change in his appetite and is drinking well. He had one episode of diarrhea but states that has resolved. He denied nausea or vomiting or changes.    He will call if he has any questions or concerns. Meghan Melgar

## 2018-01-08 NOTE — NURSING NOTE
"Chief Complaint   Patient presents with     Physical       Initial /64  Pulse 85  Temp 99.7  F (37.6  C) (Tympanic)  Resp 16  Ht 5' 10\" (1.778 m)  Wt 209 lb (94.8 kg)  SpO2 96%  BMI 29.99 kg/m2 Estimated body mass index is 29.99 kg/(m^2) as calculated from the following:    Height as of this encounter: 5' 10\" (1.778 m).    Weight as of this encounter: 209 lb (94.8 kg).  Medication Reconciliation: complete    "

## 2018-01-08 NOTE — PROGRESS NOTES
SUBJECTIVE:   Roc Parkinson is a 89 year old male who presents for Preventive Visit.      Are you in the first 12 months of your Medicare coverage?  No    Physical   Annual:     Getting at least 3 servings of Calcium per day::  Yes    Bi-annual eye exam::  Yes    Dental care twice a year::  Yes    Sleep apnea or symptoms of sleep apnea::  None    Diet::  Regular (no restrictions)    Taking medications regularly::  Yes    Medication side effects::  No muscle aches    Additional concerns today::  No    Ability to successfully perform activities of daily living: no assistance needed  Home Safety:  No safety concerns identified  Hearing Impairment: feel that people are mumbling or not speaking clearly and difficult to understand a speaker at a public meeting or Buddhism service      Ability to successfully perform activities of daily living: Yes, no assistance needed  Home safety:  none identified   Hearing impairment: Yes, Feel that people are mumbling or not speaking clearly.  Difficult to understand a speaker at a public meeting or Buddhism service.      Fall risk:         COGNITIVE SCREEN  1) Repeat 3 items (Banana, Sunrise, Chair)    2) Clock draw: NORMAL  3) 3 item recall: Recalls 3 objects  Results: 3 items recalled: COGNITIVE IMPAIRMENT LESS LIKELY    Mini-CogTM Copyright S Rober. Licensed by the author for use in [facility]; reprinted with permission (soob@[facility]). All rights reserved.          Reviewed and updated as needed this visit by clinical staff  Tobacco  Allergies  Meds  Med Hx  Surg Hx  Fam Hx  Soc Hx        Reviewed and updated as needed this visit by Provider        Social History   Substance Use Topics     Smoking status: Never Smoker     Smokeless tobacco: Never Used     Alcohol use No       Alcohol Use 1/8/2018   If you drink alcohol, do you typically have greater than 3 drinks per day OR greater than 7 drinks per week?   Not applicable   No flowsheet data  found.                Today's PHQ-2 Score:   PHQ-2 ( 1999 [employer]) 1/8/2018   Q1: Little interest or pleasure in doing things 0   Q2: Feeling down, depressed or hopeless 0   PHQ-2 Score 0   Q1: Little interest or pleasure in doing things Not at all   Q2: Feeling down, depressed or hopeless Not at all   PHQ-2 Score 0       Do you feel safe in your environment - Yes    Do you have a Health Care Directive?: Yes: Advance Directive has been received and scanned.    Current providers sharing in care for this patient include:   Patient Care Team:  Dickson Reich MD as PCP - General (Family Practice)    The following health maintenance items are reviewed in Epic and correct as of today:  Health Maintenance   Topic Date Due     OP ANNUAL INR REFERRAL  07/10/2016     MICROALBUMIN Q1 YEAR  09/28/2017     A1C Q6 MO  11/23/2017     EYE EXAM Q1 YEAR  01/27/2018     LIPID MONITORING Q1 YEAR  05/23/2018     TSH W/ FREE T4 REFLEX Q2 YEAR  09/28/2018     FOOT EXAM Q1 YEAR  12/04/2018     FALL RISK ASSESSMENT  12/13/2018     CREATININE Q1 YEAR  01/03/2019     BMP Q1 YR  01/03/2019     HEMOGLOBIN Q1 YR  01/04/2019     TETANUS IMMUNIZATION (SYSTEM ASSIGNED)  08/11/2019     ADVANCE DIRECTIVE PLANNING Q5 YRS  02/28/2022     INFLUENZA VACCINE (SYSTEM ASSIGNED)  Completed     PNEUMOCOCCAL  Completed     Patient Active Problem List   Diagnosis     Dysphagia     Malignant neoplasm of prostate (H)     Malignant neoplasm of bladder (H)     Essential hypertension, benign     Asymptomatic varicose veins     Congenital hiatus hernia     Oral lesion     CTS (carpal tunnel syndrome)     Irritable bowel syndrome     Vitamin D deficiency disease     Microalbuminuria     Obesity     UTI (urinary tract infection) w hx of recurrence     Iron deficiency anemia     Nonsmoker     A-fib (H)     Health Care Home     Hyperlipidemia     CKD (chronic kidney disease) stage 3, GFR 30-59 ml/min     Hypothyroidism     Long-term (current) use of  anticoagulants [Z79.01]     Macrocytic anemia     GIB (gastrointestinal bleeding)     Multiple myeloma not having achieved remission (H)     Hypercalcemia     Hyperglycemia     Urinary tract infection     Hyperkalemia     ACP (advance care planning)     MDS (myelodysplastic syndrome) (H)     Chemotherapy-induced neutropenia (H)     Anemia in neoplastic disease     Type 2 diabetes mellitus with stage 3 chronic kidney disease, with long-term current use of insulin (H)     Past Surgical History:   Procedure Laterality Date     ARTHROPLASTY KNEE  11/5/2012    Procedure: ARTHROPLASTY KNEE;  RIGHT TOTAL KNEE ARTHROPLASTY (SMITH & NEPHEW)^;  Surgeon: Dickson Schulte MD;  Location:  OR     BIOPSY      bladder     COLONOSCOPY  2007     COLONOSCOPY N/A 11/15/2016    Procedure: COMBINED COLONOSCOPY, SINGLE OR MULTIPLE BIOPSY/POLYPECTOMY BY BIOPSY;  Surgeon: Kenneth Fulton MD;  Location:  GI     GENITOURINARY SURGERY      TURP x2 and bladder scraping     GI SURGERY      anal fistula     ORTHOPEDIC SURGERY      lt knee in nov.2009     PHACOEMULSIFICATION CLEAR CORNEA WITH STANDARD INTRAOCULAR LENS IMPLANT Left 10/25/2017    Procedure: PHACOEMULSIFICATION CLEAR CORNEA WITH STANDARD INTRAOCULAR LENS IMPLANT;  LEFT EYE PHACOEMULSIFICATION CLEAR CORNEA WITH STANDARD INTRAOCULAR LENS IMPLANT ;  Surgeon: Shady Haider MD;  Location:  EC     PHACOEMULSIFICATION CLEAR CORNEA WITH STANDARD INTRAOCULAR LENS IMPLANT Right 11/1/2017    Procedure: PHACOEMULSIFICATION CLEAR CORNEA WITH STANDARD INTRAOCULAR LENS IMPLANT;  RIGHT EYE PHACOEMULSIFICATION CLEAR CORNEA WITH STANDARD INTRAOCULAR LENS IMPLANT;  Surgeon: Shady Haider MD;  Location:  EC     SOFT TISSUE SURGERY      melanoma       Social History   Substance Use Topics     Smoking status: Never Smoker     Smokeless tobacco: Never Used     Alcohol use No     Family History   Problem Relation Age of Onset     Cardiovascular Father 81     heart arrythmia      "Endocrine Disease Brother 74     Paget's               Review of Systems  C: NEGATIVE for fever, chills, change in weight  I: NEGATIVE for worrisome rashes, moles or lesions  E: NEGATIVE for vision changes or irritation  E/M: NEGATIVE for ear, mouth and throat problems  R: NEGATIVE for significant cough or SOB  B: NEGATIVE for masses, tenderness or discharge  CV: NEGATIVE for chest pain, palpitations or peripheral edema  GI: NEGATIVE for nausea, abdominal pain, heartburn, or change in bowel habits  : NEGATIVE for frequency, dysuria, or hematuria  M: NEGATIVE for significant arthralgias or myalgia  N: NEGATIVE for weakness, dizziness or paresthesias  E: NEGATIVE for temperature intolerance, skin/hair changes  H: NEGATIVE for bleeding problems  P: NEGATIVE for changes in mood or affect    OBJECTIVE:   /64  Pulse 85  Temp 99.7  F (37.6  C) (Tympanic)  Resp 16  Ht 5' 10\" (1.778 m)  Wt 209 lb (94.8 kg)  SpO2 96%  BMI 29.99 kg/m2 Estimated body mass index is 29.99 kg/(m^2) as calculated from the following:    Height as of this encounter: 5' 10\" (1.778 m).    Weight as of this encounter: 209 lb (94.8 kg).  Physical Exam  GENERAL: healthy, alert and no distress  EYES: Eyes grossly normal to inspection, PERRL and conjunctivae and sclerae normal  HENT: ear canals and TM's normal, nose and mouth without ulcers or lesions  NECK: no adenopathy, no asymmetry, masses, or scars and thyroid normal to palpation  RESP: lungs clear to auscultation - no rales, rhonchi or wheezes  CV: regular rate and rhythm, normal S1 S2, no S3 or S4, no murmur, click or rub, no peripheral edema and peripheral pulses strong  ABDOMEN: soft, nontender, no hepatosplenomegaly, no masses and bowel sounds normal  MS: no gross musculoskeletal defects noted, no edema  SKIN: no suspicious lesions or rashes  NEURO: Normal strength and tone, mentation intact and speech normal  PSYCH: mentation appears normal, affect normal/bright  LYMPH: no " "cervical, supraclavicular, axillary, or inguinal adenopathy    ASSESSMENT / PLAN:       ICD-10-CM    1. Type 2 diabetes mellitus with stage 3 chronic kidney disease, with long-term current use of insulin (H) E11.22 HEMOGLOBIN A1C    N18.3 Albumin Random Urine Quantitative with Creat Ratio    Z79.4    2. CKD (chronic kidney disease) stage 3, GFR 30-59 ml/min N18.3    3. Multiple myeloma not having achieved remission (H) C90.00        End of Life Planning:  Patient currently has an advanced directive: Yes.  Practitioner is supportive of decision.    COUNSELING:  Reviewed preventive health counseling, as reflected in patient instructions       Regular exercise       Healthy diet/nutrition        Estimated body mass index is 29.99 kg/(m^2) as calculated from the following:    Height as of this encounter: 5' 10\" (1.778 m).    Weight as of this encounter: 209 lb (94.8 kg).  Weight management plan: Discussed healthy diet and exercise guidelines and patient will follow up in 6 months in clinic to re-evaluate.   reports that he has never smoked. He has never used smokeless tobacco.        Appropriate preventive services were discussed with this patient, including applicable screening as appropriate for cardiovascular disease, diabetes, osteopenia/osteoporosis, and glaucoma.  As appropriate for age/gender, discussed screening for colorectal cancer, prostate cancer, breast cancer, and cervical cancer. Checklist reviewing preventive services available has been given to the patient.    Reviewed patients plan of care and provided an AVS. The Basic Care Plan (routine screening as documented in Health Maintenance) for Roc meets the Care Plan requirement. This Care Plan has been established and reviewed with the Patient.    Counseling Resources:  ATP IV Guidelines  Pooled Cohorts Equation Calculator  Breast Cancer Risk Calculator  FRAX Risk Assessment  ICSI Preventive Guidelines  Dietary Guidelines for Americans, 2010  USDA's " MyPlate  ASA Prophylaxis  Lung CA Screening    Dickson Reich MD  [facility] XERXESAnswers for HPI/ROS submitted by the patient on 1/8/2018   PHQ-2 Score: 0

## 2018-01-08 NOTE — MR AVS SNAPSHOT
After Visit Summary   1/8/2018    Roc Parkinson    MRN: [id number]           Patient Information     Date Of Birth          7/7/1926        Visit Information        Provider Department      1/8/2018 8:30 AM Dickson Reich MD [facility]        Today's Diagnoses     Type 2 diabetes mellitus with stage 3 chronic kidney disease, with long-term current use of insulin (H)    -  1    CKD (chronic kidney disease) stage 3, GFR 30-59 ml/min        Multiple myeloma not having achieved remission (H)          Care Instructions      Preventive Health Recommendations:       Male Ages 65 and over    Yearly exam:             See your health care provider every year in order to  o   Review health changes.   o   Discuss preventive care.    o   Review your medicines if your doctor has prescribed any.    Talk with your health care provider about whether you should have a test to screen for prostate cancer (PSA).    Every 3 years, have a diabetes test (fasting glucose). If you are at risk for diabetes, you should have this test more often.    Every 5 years, have a cholesterol test. Have this test more often if you are at risk for high cholesterol or heart disease.     Every 10 years, have a colonoscopy. Or, have a yearly FIT test (stool test). These exams will check for colon cancer.    Talk to with your health care provider about screening for Abdominal Aortic Aneurysm if you have a family history of AAA or have a history of smoking.  Shots:     Get a flu shot each year.     Get a tetanus shot every 10 years.     Talk to your doctor about your pneumonia vaccines. There are now two you should receive - Pneumovax (PPSV 23) and Prevnar (PCV 13).    Talk to your doctor about a shingles vaccine.     Talk to your doctor about the hepatitis B vaccine.  Nutrition:     Eat at least 5 servings of fruits and vegetables each day.     Eat whole-grain bread, whole-wheat pasta and brown rice  instead of white grains and rice.     Talk to your doctor about Calcium and Vitamin D.   Lifestyle    Exercise for at least 150 minutes a week (30 minutes a day, 5 days a week). This will help you control your weight and prevent disease.     Limit alcohol to one drink per day.     No smoking.     Wear sunscreen to prevent skin cancer.     See your dentist every six months for an exam and cleaning.     See your eye doctor every 1 to 2 years to screen for conditions such as glaucoma, macular degeneration and cataracts.          Follow-ups after your visit        Your next 10 appointments already scheduled     Ye 10, 2018  9:45 AM CST   Return Visit with  Oncology Nurse   [facility] ([facility])    [facility]  [address]  [place] [address]   [phone]            Ye 10, 2018 10:15 AM CST   Return Visit with Gretel Quinn MD   [facility] ([facility])    [facility]  [address]  [place] [address]   [phone]            Ye 10, 2018 10:30 AM CST   Level 2 with  INFUSION CHAIR 2   [facility] ([facility])    [facility]  [address]  [place] [address]   [phone]            Jan 11, 2018 10:30 AM CST   Level 2 with  INFUSION CHAIR 8   [facility] ([facility])    [facility]  [address]  [place] [address]   [phone]            Jan 17, 2018 10:30 AM CST   Level 2 with  INFUSION CHAIR 2   [facility] ([facility])    [facility]  [address]  [place] [address]   [phone]            Jan 18, 2018 10:00 AM CST   Level 2 with  INFUSION CHAIR 18   [facility] ([facility])    [facility]  [address]  [place] [address]   [phone]            Jan 23, 2018 10:30 AM CST   Diabetic Education with  DIABETIC ED RESOURCE   [facility] ([facility])    [address] [address]   [phone]            Jan 31, 2018 11:00 AM CST   Return Visit with  Oncology Nurse   [facility]    [facility]  [address]  [place] [address]   [phone]            Jan 31, 2018 11:30 AM CST   Level 1 with  INFUSION CHAIR 16   [facility])    [facility]  [address]  [place] [address]   [phone]            Jan 31, 2018 11:45 AM CST   Return Visit with Gretel Quinn MD   [facility])    [facility]  [address]  [place] [address]   [phone]              Who to contact     If you have questions or need follow up information about today's clinic visit or your schedule please contact [facility] directly at [phone].  Normal or non-critical lab and imaging results will be communicated to you by [employer], letter or phone within 4 business days after the clinic has received the results. If you do not hear from us within 7 days, please contact the clinic through Securushart or phone. If you have a critical or abnormal lab result, we will notify you by phone as soon as possible.  Submit refill requests through [employer] or call your pharmacy and they will forward the refill request to us. Please allow 3 business days for your refill to be completed.          Additional Information About Your Visit        [employer] Information     [employer] lets you send messages to your doctor, view your test results, renew your prescriptions, schedule  "appointments and more. To sign up, go to [email or web address] . Click on \"Log in\" on the left side of the screen, which will take you to the Welcome page. Then click on \"Sign up Now\" on the right side of the page.     You will be asked to enter the access code listed below, as well as some personal information. Please follow the directions to create your username and password.     Your access code is: [email or web address]  Expires: 3/26/2018 11:10 AM     Your access code will  in 90 days. If you need help or a new code, please call your [facility] clinic or [phone].        Care EveryWhere ID     This is your Care EveryWhere ID. This could be used by other organizations to access your [facility] medical records  [id number]        Your Vitals Were     Pulse Temperature Respirations Height Pulse Oximetry BMI (Body Mass Index)    85 99.7  F (37.6  C) (Tympanic) 16 5' 10\" (1.778 m) 96% 29.99 kg/m2       Blood Pressure from Last 3 Encounters:   18 118/64   18 155/71   18 152/75    Weight from Last 3 Encounters:   18 209 lb (94.8 kg)   18 204 lb 3.2 oz (92.6 kg)   17 207 lb (93.9 kg)              We Performed the Following     Albumin Random Urine Quantitative with Creat Ratio     HEMOGLOBIN A1C     UA with Microscopic          Where to get your medicines      These medications were sent to [facility] [address] [address]     Phone:  [phone]     furosemide 20 MG tablet          Primary Care Provider Office Phone # Fax #    Dickson Reich [phone] [address]  [facility] [address]        Equal Access to Services     ELIE GAGNON : Jeanne hsieh Soaimee, waaxda luqadaha, qaybta alexalprakash thomason, melita shaw . So [place] [phone].    ATENCIÓN: Si habla español, tiene a nolan disposición servicios gratuitos de asistencia lingüística. " Eric fong [phone].    We comply with applicable federal civil rights laws and [place] laws. We do not discriminate on the basis of race, color, national origin, age, disability, sex, sexual orientation, or gender identity.            Thank you!     Thank you for choosing [facility]  for your care. Our goal is always to provide you with excellent care. Hearing back from our patients is one way we can continue to improve our services. Please take a few minutes to complete the written survey that you may receive in the mail after your visit with us. Thank you!             Your Updated Medication List - Protect others around you: Learn how to safely use, store and throw away your medicines at www.disposemymeds.org.          This list is accurate as of: 1/8/18  8:56 AM.  Always use your most recent med list.                   Brand Name Dispense Instructions for use Diagnosis    * acyclovir 400 MG tablet    ZOVIRAX    60 tablet    Take 1 tablet (400 mg) by mouth 2 times daily Viral Prophylaxis.    Multiple myeloma not having achieved remission (H)       * acyclovir 400 MG tablet    ZOVIRAX    60 tablet    Take 1 tablet (400 mg) by mouth 2 times daily Viral Prophylaxis.    Multiple myeloma not having achieved remission (H)       amLODIPine 5 MG tablet    NORVASC    30 tablet    TAKE 1 TABLET BY MOUTH DAILY    Essential hypertension, benign       * B-D U/F 31G X 8 MM   Generic drug:  insulin pen needle           * B-D U/F 31G X 8 MM   Generic drug:  insulin pen needle     500 each    USE 5 PEN NEEDLES PER DAY OR AS DIRECTED    Type 2 diabetes mellitus with stage 3 chronic kidney disease, with long-term current use of insulin (H)       dexamethasone 4 MG tablet    DECADRON    40 tablet    Take 5 tablets (20 mg) by mouth with food. Days 1, 2, 8, 9, 15, 16, 22, and 23. On carfilzomib days, take prior to carfilzomib dose.    Multiple myeloma not having achieved remission (H)       ferrous  sulfate 325 (65 FE) MG tablet    IRON     Take 325 mg by mouth daily (with breakfast)        FREESTYLE LITE test strip   Generic drug:  blood glucose monitoring     200 strip    USE 1 STRIP TO TEST TWICE DAILY.    Uncontrolled type 1 diabetes mellitus with stage 3 chronic kidney disease (H)       furosemide 20 MG tablet    LASIX    90 tablet    Take 1 tablet (20 mg) by mouth daily    Multiple myeloma not having achieved remission (H)       gemfibrozil 600 MG tablet    LOPID    180 tablet    TAKE 1 TABLET BY MOUTH TWICE DAILY    Hyperlipidemia       glipiZIDE 10 MG tablet    GLUCOTROL    180 tablet    Take 1 tablet (10 mg) by mouth daily (with breakfast)    Type 2 diabetes mellitus with stage 3 chronic kidney disease, with long-term current use of insulin (H), Uncontrolled type 2 diabetes mellitus with hyperglycemia, unspecified long term insulin use status (H), Type 2 diabetes mellitus with diabetic chronic kidney disease, unspecified CKD stage, unspecified long term insulin use status (H)       * insulin aspart 100 UNIT/ML injection    NovoLOG FLEXPEN    15 mL    Inject 4-17 Units Subcutaneous 4 times daily (with meals and nightly)    Uncontrolled type 2 diabetes mellitus with hyperglycemia, unspecified long term insulin use status (H), Type 2 diabetes mellitus with diabetic chronic kidney disease, unspecified CKD stage, unspecified long term insulin use status (H), Type 2 diabetes mellitus with stage 3 chronic kidney disease, with long-term current use of insulin (H)       * insulin aspart 100 UNIT/ML injection    NovoLOG PEN    12 mL    Inject 13 units before breakfast, 11 units before lunch, 9 units before dinner, and 4 units at bedtime. Before meal correction: 140-175 add 1 unit  176-210 add 2 units  211-245 add 3 units  246-280 add 4 units  281-315 add 5 units 316-350 add 6 units  351-385 add 7 units 386-420 add 8 units  Over 420 add 9 units    Uncontrolled type 2 diabetes mellitus with hyperglycemia,  unspecified long term insulin use status (H), Type 2 diabetes mellitus with diabetic chronic kidney disease, unspecified CKD stage, unspecified long term insulin use status (H), Type 2 diabetes mellitus with stage 3 chronic kidney disease, with long-term current use of insulin (H)       insulin detemir 100 UNIT/ML injection    LEVEMIR FLEXPEN/FLEXTOUCH    15 mL    Inject 16 Units Subcutaneous every morning (before breakfast) On Wednesday and Thursday morning, inject 18 units.    Type 2 diabetes mellitus with diabetic chronic kidney disease, unspecified CKD stage, unspecified long term insulin use status (H), Uncontrolled type 2 diabetes mellitus with hyperglycemia, unspecified long term insulin use status (H), Type 2 diabetes mellitus with stage 3 chronic kidney disease, with long-term current use of insulin (H)       levothyroxine 25 MCG tablet    SYNTHROID/LEVOTHROID    90 tablet    TAKE 1 TABLET BY MOUTH EVERY DAY    Acquired hypothyroidism       * lisinopril 30 MG tablet    PRINIVIL,ZESTRIL    90 tablet    TAKE 1 TABLET BY MOUTH EVERY DAY    Essential hypertension, benign       * lisinopril 30 MG tablet    PRINIVIL,ZESTRIL    90 tablet    Take 0.5 tablets (15 mg) by mouth daily    Essential hypertension, benign       LORazepam 0.5 MG tablet    ATIVAN    30 tablet    Take 1 tablet (0.5 mg) by mouth every 4 hours as needed (Anxiety, Nausea/Vomiting or Sleep)    Multiple myeloma not having achieved remission (H)       metoprolol 25 MG tablet    LOPRESSOR    180 tablet    TAKE 1 TABLET BY MOUTH TWICE DAILY    Essential hypertension       omeprazole 20 MG CR capsule    priLOSEC    90 capsule    TAKE 1 CAPSULE BY MOUTH EVERY DAY    Congenital hiatus hernia       ondansetron 8 MG tablet    ZOFRAN    10 tablet    Take 1 tablet (8 mg) by mouth every 8 hours as needed (Nausea/Vomiting)    Multiple myeloma not having achieved remission (H)       polyethylene glycol powder    MIRALAX/GLYCOLAX          prochlorperazine 10 MG  tablet    COMPAZINE    30 tablet    Take 1 tablet (10 mg) by mouth every 6 hours as needed (Nausea/Vomiting)    Multiple myeloma not having achieved remission (H)       Selenium 200 MCG Tabs      Take 100 mcg by mouth daily. Pt takes one half tab of the 200 mcg daily        tamsulosin 0.4 MG capsule    FLOMAX    90 capsule    TAKE 1 CAPSULE BY MOUTH DAILY    Malignant neoplasm of prostate (H)       VITAMIN C PO      Take 1,000 mg by mouth daily        VITAMIN D3 PO      Take 1,000 Units by mouth daily        * Notice:  This list has 8 medication(s) that are the same as other medications prescribed for you. Read the directions carefully, and ask your doctor or other care provider to review them with you.

## 2018-01-09 NOTE — PROGRESS NOTES
Oral Chemotherapy Monitoring Program.    Pharmacy signing off on patient monitoring while no longer on oral chemotherapy. Will continue to follow as needed/appropriate. Thank you for the opportunity to participate in this patient's care.    Maria Eugenia Cortes PharmD  January 9, 2018

## 2018-01-09 NOTE — TELEPHONE ENCOUNTER
Message left with Roc that we added a 9:30am echo at [facility] for tomorrow.  He is to check in at the [facility] entrance at 930 and then come over to the clinic for treatment after.  Requested return call to confirm message received

## 2018-01-10 NOTE — LETTER
"    1/10/2018         RE: Roc Parkinson  [address]   [facility] [place] [address]        Dear Colleague,    Thank you for referring your patient, Roc Parkinson, to the [facility]. Please see a copy of my visit note below.    Oncology Rooming Note    January 10, 2018 10:01 AM   Roc Parkinson is a 89 year old male who presents for:    Chief Complaint   Patient presents with     Oncology Clinic Visit     MDS (myelodysplastic syndrome)      Initial Vitals: /65 (BP Location: Left arm)  Pulse 83  Temp 98.8  F (37.1  C) (Oral)  Resp 20  Wt 93.1 kg (205 lb 3.2 oz)  SpO2 93%  BMI 29.44 kg/m2 Estimated body mass index is 29.44 kg/(m^2) as calculated from the following:    Height as of 1/8/18: 1.778 m (5' 10\").    Weight as of this encounter: 93.1 kg (205 lb 3.2 oz). Body surface area is 2.14 meters squared.  No Pain (0) Comment: Data Unavailable   No LMP for male patient.  Allergies reviewed: Yes  Medications reviewed: Yes    Medications: Medication refills not needed today.  Pharmacy name entered into [employer]:    [facility] [place], [place] - [address] [facility] [id number] [place] [address] [address] AT [facility] [address]    Clinical concerns: None                      4 minutes for nursing intake (face to face time)     Kiya Drake MA              [facility]  HEMATOLOGY ONCOLOGY     DIAGNOSIS:    1- Kappa light chain IgM multiple myeloma in a 89-year-old patient.  Bone marrow biopsy on 11/17/2016 showed hypercellular bone marrow with 65% cellularity of light chain restricted plasma cells.  FISH or G-banding could not be performed due to insufficient sample.   There is a background of myelodysplastic syndrome.  The patient was initially seen in the hospital on 11/17/2016 for macrocytic anemia and pancytopenia and transfusion requirement and a bone marrow biopsy was performed.   2- History of prostate cancer " s/p EBRT s/p brachytherapy in 2003 (recent PSA 0.10), superficial bladder cancer ,no recurrences since 1986     TREATMENT: 12/15/16 velcade/dex. 4/12/17 added cyclophosphamide 300 mg weekly.6/28/17 we discontinued velcade due to concern for neuropathy and continued with weekly cyclophosphamide and dexamethasone.   1/2/18 switched to Carfilzomib and dexamethasone.      SUBJECTIVE:  The patient was seen as a followup today. He has been feeling well. No new complaints.     REVIEW OF SYSTEMS:  A complete review of systems was performed and found to be negative other than pertinent positives mentioned in history of present illness.     Past medical, social histories reviewed.    Meds- Reviewed.     PHYSICAL EXAMINATION:   VITAL SIGNS:/65 (BP Location: Left arm)  Pulse 83  Temp 98.8  F (37.1  C) (Oral)  Resp 20  Wt 93.1 kg (205 lb 3.2 oz)  SpO2 93%  BMI 29.44 kg/m2  CONSTITUTIONAL: Sitting comfortably.   HEENT: Pupils are equal. Oropharynx is clear.   NECK: No cervical or supraclavicular lymphadenopathy.   RESPIRATORY: Clear bilaterally.   CARD/VASC: S1, S2, regular.   GI: Soft, nontender, nondistended, no hepatosplenomegaly.   MUSKULOSKELETAL: Warm, well perfused.   NEUROLOGIC: Alert, awake.   INTEGUMENT: No rash.   LYMPHATICS: No edema.   PSYCH: Mood and affect was normal.     LABORATORY DATA AND IMAGING REVIEWED DURING THIS VISIT:  Recent Labs   Lab Test  01/03/18   1120  12/20/17   1100   NA  137  136   POTASSIUM  4.7  4.3   CHLORIDE  106  105   CO2  22  21   ANIONGAP  9  10   BUN  27  30   CR  1.10  1.11   GLC  195*  193*   MICHELLE  9.2  9.4     Recent Labs   Lab Test  01/10/18   0951  01/04/18   1150  01/03/18   1120   WBC  3.1*  3.6*  4.1   HGB  8.4*  8.2*  8.3*   PLT  126*  129*  161   MCV  112*  111*  115*   NEUTROPHIL  84.2  88.6  90.0     Recent Labs   Lab Test  01/03/18   1120  12/20/17   1100  11/29/17   1130   11/17/16   0938   BILITOTAL  0.5  0.5  0.5   < >   --    ALKPHOS  86  83  87   < >   --     ALT  14  12  14   < >   --    AST  16  11  15   < >   --    ALBUMIN  2.7*  2.7*  2.8*   < >   --    LDH   --    --    --    --   110    < > = values in this interval not displayed.     ECOG PS: 1    ASSESSMENT:  This is a 89-year-old gentleman who has kappa light chain multiple myeloma with hypercellular marrow with 65% of cells are light chain restricted plasma cells.  He had severe pancytopenia and has needed a transfusion in the hospital.  He did not have hypercalcemia and his renal function was normal. He has a background of myelodysplastic syndrome on his bone marrow biopsy; however, majority of the cell population was monoclonal plasma cell population.   He was started on treatment due to cytopenia.   In 4/2017 his light chain levels were getting worse. M spike was stable. We added cyclophosphamide to the regimen. In 6/2017 discontinued velcade for concern of development of neuropathy, in hindsight the pain appeared to be related to arthritis.     - He is on Aranesp 300 mcg SQ every three weeks.    He is on Carfilzomib and dexamethasone.     - Labs were reviewed with the patient, his counts are within reasonable range.     PLAN:   1.  Continue Carfilzomib/Dexamethasone. Chemo today  2.  Zometa every 12 weeks  3. Continue Aranesp 300 mcg SQ every three weeks  4- RTC MD 4 weeks  5- Continue Acyclovir    GRETEL QUINN MD    1/10/2018        Again, thank you for allowing me to participate in the care of your patient.        Sincerely,        Gretel Quinn MD

## 2018-01-10 NOTE — PROGRESS NOTES
[facility] PHYSICIANS  HEMATOLOGY ONCOLOGY     DIAGNOSIS:    1- Kappa light chain IgM multiple myeloma in a 89-year-old patient.  Bone marrow biopsy on 11/17/2016 showed hypercellular bone marrow with 65% cellularity of light chain restricted plasma cells.  FISH or G-banding could not be performed due to insufficient sample.   There is a background of myelodysplastic syndrome.  The patient was initially seen in the hospital on 11/17/2016 for macrocytic anemia and pancytopenia and transfusion requirement and a bone marrow biopsy was performed.   2- History of prostate cancer s/p EBRT s/p brachytherapy in 2003 (recent PSA 0.10), superficial bladder cancer ,no recurrences since 1986     TREATMENT: 12/15/16 velcade/dex. 4/12/17 added cyclophosphamide 300 mg weekly.6/28/17 we discontinued velcade due to concern for neuropathy and continued with weekly cyclophosphamide and dexamethasone.   1/2/18 switched to Carfilzomib and dexamethasone.      SUBJECTIVE:  The patient was seen as a followup today. He has been feeling well. No new complaints.     REVIEW OF SYSTEMS:  A complete review of systems was performed and found to be negative other than pertinent positives mentioned in history of present illness.     Past medical, social histories reviewed.    Meds- Reviewed.     PHYSICAL EXAMINATION:   VITAL SIGNS:/65 (BP Location: Left arm)  Pulse 83  Temp 98.8  F (37.1  C) (Oral)  Resp 20  Wt 93.1 kg (205 lb 3.2 oz)  SpO2 93%  BMI 29.44 kg/m2  CONSTITUTIONAL: Sitting comfortably.   HEENT: Pupils are equal. Oropharynx is clear.   NECK: No cervical or supraclavicular lymphadenopathy.   RESPIRATORY: Clear bilaterally.   CARD/VASC: S1, S2, regular.   GI: Soft, nontender, nondistended, no hepatosplenomegaly.   MUSKULOSKELETAL: Warm, well perfused.   NEUROLOGIC: Alert, awake.   INTEGUMENT: No rash.   LYMPHATICS: No edema.   PSYCH: Mood and affect was normal.     LABORATORY DATA AND IMAGING REVIEWED DURING THIS  VISIT:  Recent Labs   Lab Test  01/03/18   1120  12/20/17   1100   NA  137  136   POTASSIUM  4.7  4.3   CHLORIDE  106  105   CO2  22  21   ANIONGAP  9  10   BUN  27  30   CR  1.10  1.11   GLC  195*  193*   MICHELLE  9.2  9.4     Recent Labs   Lab Test  01/10/18   0951  01/04/18   1150  01/03/18   1120   WBC  3.1*  3.6*  4.1   HGB  8.4*  8.2*  8.3*   PLT  126*  129*  161   MCV  112*  111*  115*   NEUTROPHIL  84.2  88.6  90.0     Recent Labs   Lab Test  01/03/18   1120  12/20/17   1100  11/29/17   1130   11/17/16   0938   BILITOTAL  0.5  0.5  0.5   < >   --    ALKPHOS  86  83  87   < >   --    ALT  14  12  14   < >   --    AST  16  11  15   < >   --    ALBUMIN  2.7*  2.7*  2.8*   < >   --    LDH   --    --    --    --   110    < > = values in this interval not displayed.     ECOG PS: 1    ASSESSMENT:  This is a 89-year-old gentleman who has kappa light chain multiple myeloma with hypercellular marrow with 65% of cells are light chain restricted plasma cells.  He had severe pancytopenia and has needed a transfusion in the hospital.  He did not have hypercalcemia and his renal function was normal. He has a background of myelodysplastic syndrome on his bone marrow biopsy; however, majority of the cell population was monoclonal plasma cell population.   He was started on treatment due to cytopenia.   In 4/2017 his light chain levels were getting worse. M spike was stable. We added cyclophosphamide to the regimen. In 6/2017 discontinued velcade for concern of development of neuropathy, in hindsight the pain appeared to be related to arthritis.     - He is on Aranesp 300 mcg SQ every three weeks.    He is on Carfilzomib and dexamethasone.     - Labs were reviewed with the patient, his counts are within reasonable range.     PLAN:   1.  Continue Carfilzomib/Dexamethasone. Chemo today  2.  Zometa every 12 weeks  3. Continue Aranesp 300 mcg SQ every three weeks  4- RTC MD 4 weeks  5- Continue Acyclovir    ALISON JON MD     1/10/2018

## 2018-01-10 NOTE — MR AVS SNAPSHOT
After Visit Summary   1/10/2018    Roc Parkinson    MRN: [id number]           Patient Information     Date Of Birth          7/7/1926        Visit Information        Provider Department      1/10/2018 10:15 AM Gretel Quinn MD [facility]        Today's Diagnoses     Malignant neoplasm of prostate (H)    -  1      Care Instructions    1.  Continue Carfilzomib/Dexamethasone. Chemo today  2.  Zometa every 12 weeks  3. Continue Aranesp 300 mcg SQ every three weeks  4- RTC MD 4 weeks  5- Continue Acyclovir          Follow-ups after your visit        Your next 10 appointments already scheduled     Jan 11, 2018  9:30 AM CST   Ech Complete with [id number]   [facility] CV Echocardiography (Cardiovascular Imaging at [facility])    [address]  [address]  [facility] [id number] [phone]           1. Please bring or wear a comfortable two-piece outfit. 2. You may eat, drink and take your normal medicines. 3. For any questions that cannot be answered, please contact the ordering physician            Jan 11, 2018 10:30 AM CST   Level 2 with  INFUSION CHAIR 8   [facility] ([facility])    [facility]  [address]  [facility] [address]   [phone]            Jan 17, 2018 10:30 AM CST   Level 2 with  INFUSION CHAIR 2   [facility] ([facility])    [facility]  [address]  [facility] [address]   [phone]            Jan 18, 2018 10:00 AM CST   Level 2 with  INFUSION CHAIR 18   [facility] ([facility])    [facility]  [address]  [facility] [address]   [phone]            Jan 23, 2018 10:30 AM CST   Diabetic Education with  DIABETIC ED RESOURCE   [facility]  [facility])    [address] [place] [address]   [phone]            Jan 31, 2018 11:00 AM CST   Return Visit with  Oncology Nurse   [facility] ([facility])    [facility] [place] [address]  [place] [address]   [phone]            Jan 31, 2018 11:30 AM CST   Level 1 with  INFUSION CHAIR 16   [facility] ([facility])    [facility] [place]  [address]  [place] [address]   [phone]            Jan 31, 2018 11:45 AM CST   Return Visit with Gretel Quinn MD   [facility] ([facility])    [facility] [place]  [address]  [place] [address]   [phone]            Feb 01, 2018 10:00 AM CST   Level 2 with  INFUSION CHAIR 16   [facility] ([facility])    [facility] [place]  [address]  [place] [address]   [phone]            Mar 14, 2018 11:00 AM CDT   Level 1 with  INFUSION CHAIR 17   [facility] ([facility])    [place] [id number]  [place] [address]   [phone]              Future tests that were ordered for you today     Open Future Orders        Priority Expected Expires Ordered    Echocardiogram Routine 1/9/2018 1/9/2019 1/9/2018            Who to contact     If you have questions or need follow up information about today's clinic visit or your schedule please contact [facility] directly at [phone].  Normal or non-critical lab and imaging results will be communicated to you by MyChart, letter or phone within 4 business days after the clinic has received the results. If you do not hear from us within 7 days, please contact the clinic through MyChart or phone. If you have a critical or abnormal lab  "result, we will notify you by phone as soon as possible.  Submit refill requests through [employer] or call your pharmacy and they will forward the refill request to us. Please allow 3 business days for your refill to be completed.          Additional Information About Your Visit        [employer] Information     [employer] lets you send messages to your doctor, view your test results, renew your prescriptions, schedule appointments and more. To sign up, go to [email or web address] . Click on \"Log in\" on the left side of the screen, which will take you to the Welcome page. Then click on \"Sign up Now\" on the right side of the page.     You will be asked to enter the access code listed below, as well as some personal information. Please follow the directions to create your username and password.     Your access code is: [email or web address]  Expires: 3/26/2018 11:10 AM     Your access code will  in 90 days. If you need help or a new code, please call your [facility] clinic or [phone].        Care EveryWhere ID     This is your Care EveryWhere ID. This could be used by other organizations to access your [facility] medical records  [id number]        Your Vitals Were     Pulse Temperature Respirations Pulse Oximetry BMI (Body Mass Index)       83 98.8  F (37.1  C) (Oral) 20 93% 29.44 kg/m2        Blood Pressure from Last 3 Encounters:   01/10/18 124/65   01/10/18 124/65   18 118/64    Weight from Last 3 Encounters:   01/10/18 93.1 kg (205 lb 3.2 oz)   01/10/18 93.1 kg (205 lb 3.2 oz)   18 94.8 kg (209 lb)              Today, you had the following     No orders found for display       Primary Care Provider Office Phone # Fax #    Dickson Reich [phone] [address] [facility] [address]        Equal Access to Services     ELIE GAGNON AH: Jeanne Driver, consuelo zamarripa, melita pearson la'aan ah. So [facility] " [phone].    ATENCIÓN: Si renaldo stephens, tiene a nolan disposición servicios gratuitos de asistencia lingüística. Eric fong [phone].    We comply with applicable federal civil rights laws and [place] laws. We do not discriminate on the basis of race, color, national origin, age, disability, sex, sexual orientation, or gender identity.            Thank you!     Thank you for choosing [facility]  for your care. Our goal is always to provide you with excellent care. Hearing back from our patients is one way we can continue to improve our services. Please take a few minutes to complete the written survey that you may receive in the mail after your visit with us. Thank you!             Your Updated Medication List - Protect others around you: Learn how to safely use, store and throw away your medicines at www.disposemymeds.org.          This list is accurate as of: 1/10/18 10:58 AM.  Always use your most recent med list.                   Brand Name Dispense Instructions for use Diagnosis    * acyclovir 400 MG tablet    ZOVIRAX    60 tablet    Take 1 tablet (400 mg) by mouth 2 times daily Viral Prophylaxis.    Multiple myeloma not having achieved remission (H)       * acyclovir 400 MG tablet    ZOVIRAX    60 tablet    Take 1 tablet (400 mg) by mouth 2 times daily Viral Prophylaxis.    Multiple myeloma not having achieved remission (H)       amLODIPine 5 MG tablet    NORVASC    30 tablet    TAKE 1 TABLET BY MOUTH DAILY    Essential hypertension, benign       * B-D U/F 31G X 8 MM   Generic drug:  insulin pen needle           * B-D U/F 31G X 8 MM   Generic drug:  insulin pen needle     500 each    USE 5 PEN NEEDLES PER DAY OR AS DIRECTED    Type 2 diabetes mellitus with stage 3 chronic kidney disease, with long-term current use of insulin (H)       dexamethasone 4 MG tablet    DECADRON    40 tablet    Take 5 tablets (20 mg) by mouth with food. Days 1, 2, 8, 9, 15, 16, 22, and 23. On carfilzomib days,  take prior to carfilzomib dose.    Multiple myeloma not having achieved remission (H)       ferrous sulfate 325 (65 FE) MG tablet    IRON     Take 325 mg by mouth daily (with breakfast)        FREESTYLE LITE test strip   Generic drug:  blood glucose monitoring     200 strip    USE 1 STRIP TO TEST TWICE DAILY.    Uncontrolled type 1 diabetes mellitus with stage 3 chronic kidney disease (H)       furosemide 20 MG tablet    LASIX    90 tablet    Take 1 tablet (20 mg) by mouth daily    Multiple myeloma not having achieved remission (H)       gemfibrozil 600 MG tablet    LOPID    180 tablet    TAKE 1 TABLET BY MOUTH TWICE DAILY    Hyperlipidemia       glipiZIDE 10 MG tablet    GLUCOTROL    180 tablet    Take 1 tablet (10 mg) by mouth daily (with breakfast)    Type 2 diabetes mellitus with stage 3 chronic kidney disease, with long-term current use of insulin (H), Uncontrolled type 2 diabetes mellitus with hyperglycemia, unspecified long term insulin use status (H), Type 2 diabetes mellitus with diabetic chronic kidney disease, unspecified CKD stage, unspecified long term insulin use status (H)       * insulin aspart 100 UNIT/ML injection    NovoLOG FLEXPEN    15 mL    Inject 4-17 Units Subcutaneous 4 times daily (with meals and nightly)    Uncontrolled type 2 diabetes mellitus with hyperglycemia, unspecified long term insulin use status (H), Type 2 diabetes mellitus with diabetic chronic kidney disease, unspecified CKD stage, unspecified long term insulin use status (H), Type 2 diabetes mellitus with stage 3 chronic kidney disease, with long-term current use of insulin (H)       * insulin aspart 100 UNIT/ML injection    NovoLOG PEN    12 mL    Inject 13 units before breakfast, 11 units before lunch, 9 units before dinner, and 4 units at bedtime. Before meal correction: 140-175 add 1 unit  176-210 add 2 units  211-245 add 3 units  246-280 add 4 units  281-315 add 5 units 316-350 add 6 units  351-385 add 7 units 386-420 add  8 units  Over 420 add 9 units    Uncontrolled type 2 diabetes mellitus with hyperglycemia, unspecified long term insulin use status (H), Type 2 diabetes mellitus with diabetic chronic kidney disease, unspecified CKD stage, unspecified long term insulin use status (H), Type 2 diabetes mellitus with stage 3 chronic kidney disease, with long-term current use of insulin (H)       insulin detemir 100 UNIT/ML injection    LEVEMIR FLEXPEN/FLEXTOUCH    15 mL    Inject 16 Units Subcutaneous every morning (before breakfast) On Wednesday and Thursday morning, inject 18 units.    Type 2 diabetes mellitus with diabetic chronic kidney disease, unspecified CKD stage, unspecified long term insulin use status (H), Uncontrolled type 2 diabetes mellitus with hyperglycemia, unspecified long term insulin use status (H), Type 2 diabetes mellitus with stage 3 chronic kidney disease, with long-term current use of insulin (H)       levothyroxine 25 MCG tablet    SYNTHROID/LEVOTHROID    90 tablet    TAKE 1 TABLET BY MOUTH EVERY DAY    Acquired hypothyroidism       * lisinopril 30 MG tablet    PRINIVIL,ZESTRIL    90 tablet    TAKE 1 TABLET BY MOUTH EVERY DAY    Essential hypertension, benign       * lisinopril 30 MG tablet    PRINIVIL,ZESTRIL    90 tablet    Take 0.5 tablets (15 mg) by mouth daily    Essential hypertension, benign       LORazepam 0.5 MG tablet    ATIVAN    30 tablet    Take 1 tablet (0.5 mg) by mouth every 4 hours as needed (Anxiety, Nausea/Vomiting or Sleep)    Multiple myeloma not having achieved remission (H)       metoprolol 25 MG tablet    LOPRESSOR    180 tablet    TAKE 1 TABLET BY MOUTH TWICE DAILY    Essential hypertension       omeprazole 20 MG CR capsule    priLOSEC    90 capsule    TAKE 1 CAPSULE BY MOUTH EVERY DAY    Congenital hiatus hernia       ondansetron 8 MG tablet    ZOFRAN    10 tablet    Take 1 tablet (8 mg) by mouth every 8 hours as needed (Nausea/Vomiting)    Multiple myeloma not having achieved  remission (H)       polyethylene glycol powder    MIRALAX/GLYCOLAX          prochlorperazine 10 MG tablet    COMPAZINE    30 tablet    Take 1 tablet (10 mg) by mouth every 6 hours as needed (Nausea/Vomiting)    Multiple myeloma not having achieved remission (H)       Selenium 200 MCG Tabs      Take 100 mcg by mouth daily. Pt takes one half tab of the 200 mcg daily        tamsulosin 0.4 MG capsule    FLOMAX    90 capsule    TAKE 1 CAPSULE BY MOUTH DAILY    Malignant neoplasm of prostate (H)       VITAMIN C PO      Take 1,000 mg by mouth daily        VITAMIN D3 PO      Take 1,000 Units by mouth daily        * Notice:  This list has 8 medication(s) that are the same as other medications prescribed for you. Read the directions carefully, and ask your doctor or other care provider to review them with you.

## 2018-01-10 NOTE — PROGRESS NOTES
Infusion Nursing Note:  Roc Parkinson presents today forCycle 1 Day 8 Kyprolis, Zometa and Aranesp.    Patient seen by provider today: No   present during visit today: Not Applicable.    Note: N/A.    Intravenous Access:  Peripheral IV placed.    Treatment Conditions:  Lab Results   Component Value Date    HGB 8.4 01/10/2018     Lab Results   Component Value Date    WBC 3.1 01/10/2018      Lab Results   Component Value Date    ANEU 2.6 01/10/2018     Lab Results   Component Value Date     01/10/2018      Lab Results   Component Value Date     01/10/2018                   Lab Results   Component Value Date    POTASSIUM 4.8 01/10/2018           No results found for: MAG         Lab Results   Component Value Date    CR 1.09 01/10/2018                   Lab Results   Component Value Date    MICHELLE 9.4 01/10/2018                Lab Results   Component Value Date    BILITOTAL 0.5 01/10/2018           Lab Results   Component Value Date    ALBUMIN 2.7 01/10/2018                    Lab Results   Component Value Date    ALT 14 01/10/2018           Lab Results   Component Value Date    AST 13 01/10/2018     Results reviewed, labs MET treatment parameters, ok to proceed with treatment.    Post Infusion Assessment:  Patient tolerated infusion without incident.  Patient tolerated injection without incident.  Blood return noted pre and post infusion.  Site patent and intact, free from redness, edema or discomfort.  No evidence of extravasations.  Access saline locked and left in place for tomorrow's infusion.     Discharge Plan:   Patient declined prescription refills.  Discharge instructions reviewed with: Patient.  Patient verbalized understanding of discharge instructions and all questions answered.  Copy of AVS reviewed with patient.  Patient will return 1/11/18 for next appointment.  Patient discharged in stable condition accompanied by: self.  Departure Mode: Ambulatory.    Bere Bautista  RN

## 2018-01-10 NOTE — PATIENT INSTRUCTIONS
1.  Continue Carfilzomib/Dexamethasone. Chemo today  2.  Zometa every 12 weeks  3. Continue Aranesp 300 mcg SQ every three weeks  4- RTC MD 4 weeks  5- Continue Acyclovir

## 2018-01-10 NOTE — MR AVS SNAPSHOT
After Visit Summary   1/10/2018    Roc Parkinson    MRN: [id number]           Patient Information     Date Of Birth          7/7/1926        Visit Information        Provider Department      1/10/2018 9:45 AM SH INFUSION CHAIR 2 [facility]        Today's Diagnoses     Multiple myeloma not having achieved remission (H)    -  1    MDS (myelodysplastic syndrome) (H)        Hypercalcemia           Follow-ups after your visit        Your next 10 appointments already scheduled     Jan 11, 2018  9:30 AM CST   Ech Complete with [id number]   [facility] CV Echocardiography ([facility] at [facility])    [address]  [address]  [facility] [address]   [phone]           1. Please bring or wear a comfortable two-piece outfit. 2. You may eat, drink and take your normal medicines. 3. For any questions that cannot be answered, please contact the ordering physician            Jan 11, 2018 10:30 AM CST   Level 2 with  INFUSION CHAIR 8   [facility] ([facility])    [facility]  [address]  [facility] [address]   [phone]            Jan 17, 2018 10:30 AM CST   Level 2 with SH INFUSION CHAIR 2   [facility] ([facility])    [facility]  [address]  [facility] [address]   [phone]            Jan 18, 2018 10:00 AM CST   Level 2 with  INFUSION CHAIR 18   [facility] ([facility])    [facility]  [address]  [facility] [address]   [phone]            Jan 23, 2018 10:30 AM CST   Diabetic Education with  DIABETIC ED RESOURCE   [facility] ([facility])    [address] [id number] [phone]            Jan 31,  2018 11:00 AM CST   Return Visit with  Oncology Nurse   [facility] ([facility])    [facility]  [address]  [place] [address]   [phone]            Jan 31, 2018 11:30 AM CST   Level 1 with  INFUSION CHAIR 16   [facility] ([facility])    [facility] [place] [address]  [place] [address]   [phone]            Jan 31, 2018 11:45 AM CST   Return Visit with Gretel Quinn MD   [facility] ([facility])    [facility] [place] [place] [address]  [place] [address]   [phone]            Feb 01, 2018 10:00 AM CST   Level 2 with  INFUSION CHAIR 16   [facility] ([facility])    [facility] [address]  [place] [address]   [phone]            Mar 14, 2018 11:00 AM CDT   Level 1 with  INFUSION CHAIR 17   [facility] ([facility])    [facility] [address]  [place] [address]   [phone]              Future tests that were ordered for you today     Open Future Orders        Priority Expected Expires Ordered    Echocardiogram Routine 1/9/2018 1/9/2019 1/9/2018            Who to contact     If you have questions or need follow up information about today's clinic visit or your schedule please contact [facility] directly at [phone].  Normal or non-critical lab and imaging results will be communicated to you by MyChart, letter or phone within 4 business days after the clinic has received the results. If you do not hear from us within 7 days, please contact the clinic through MyChart or phone. If you have a critical or abnormal lab result, we will notify you by phone as soon as possible.  Submit refill requests through  "MyChart or call your pharmacy and they will forward the refill request to us. Please allow 3 business days for your refill to be completed.          Additional Information About Your Visit        MyChart Information     [employer] lets you send messages to your doctor, view your test results, renew your prescriptions, schedule appointments and more. To sign up, go to [email or web address] . Click on \"Log in\" on the left side of the screen, which will take you to the Welcome page. Then click on \"Sign up Now\" on the right side of the page.     You will be asked to enter the access code listed below, as well as some personal information. Please follow the directions to create your username and password.     Your access code is: [email or web address]  Expires: 3/26/2018 11:10 AM     Your access code will  in 90 days. If you need help or a new code, please call your [facility] clinic or [phone].        Care EveryWhere ID     This is your Care EveryWhere ID. This could be used by other organizations to access your [facility] medical records  [id number]        Your Vitals Were     Pulse Temperature Respirations Height Pulse Oximetry BMI (Body Mass Index)    83 98.8  F (37.1  C) (Oral) 20 1.778 m (5' 10\") 93% 29.44 kg/m2       Blood Pressure from Last 3 Encounters:   01/10/18 124/65   01/10/18 124/65   18 118/64    Weight from Last 3 Encounters:   01/10/18 93.1 kg (205 lb 3.2 oz)   01/10/18 93.1 kg (205 lb 3.2 oz)   18 94.8 kg (209 lb)              We Performed the Following     CBC with platelets differential     Comprehensive metabolic panel        Primary Care Provider Office Phone # Fax #    Dickson Reich [phone] [address]  [facility] [address]        Equal Access to Services     ELIE GAGNON : Jeanne Driver, waemoryda marilou, qaybmelita noguera. So [facility] [phone].    ATENCIÓN: Si renaldo stephens, " tiene a nolan disposición servicios gratuitos de asistencia lingüística. Eric fong [phone].    We comply with applicable federal civil rights laws and [place] laws. We do not discriminate on the basis of race, color, national origin, age, disability, sex, sexual orientation, or gender identity.            Thank you!     Thank you for choosing [facility]  for your care. Our goal is always to provide you with excellent care. Hearing back from our patients is one way we can continue to improve our services. Please take a few minutes to complete the written survey that you may receive in the mail after your visit with us. Thank you!             Your Updated Medication List - Protect others around you: Learn how to safely use, store and throw away your medicines at www.disposemymeds.org.          This list is accurate as of: 1/10/18  1:10 PM.  Always use your most recent med list.                   Brand Name Dispense Instructions for use Diagnosis    * acyclovir 400 MG tablet    ZOVIRAX    60 tablet    Take 1 tablet (400 mg) by mouth 2 times daily Viral Prophylaxis.    Multiple myeloma not having achieved remission (H)       * acyclovir 400 MG tablet    ZOVIRAX    60 tablet    Take 1 tablet (400 mg) by mouth 2 times daily Viral Prophylaxis.    Multiple myeloma not having achieved remission (H)       amLODIPine 5 MG tablet    NORVASC    30 tablet    TAKE 1 TABLET BY MOUTH DAILY    Essential hypertension, benign       * B-D U/F 31G X 8 MM   Generic drug:  insulin pen needle           * B-D U/F 31G X 8 MM   Generic drug:  insulin pen needle     500 each    USE 5 PEN NEEDLES PER DAY OR AS DIRECTED    Type 2 diabetes mellitus with stage 3 chronic kidney disease, with long-term current use of insulin (H)       dexamethasone 4 MG tablet    DECADRON    40 tablet    Take 5 tablets (20 mg) by mouth with food. Days 1, 2, 8, 9, 15, 16, 22, and 23. On carfilzomib days, take prior to carfilzomib  dose.    Multiple myeloma not having achieved remission (H)       ferrous sulfate 325 (65 FE) MG tablet    IRON     Take 325 mg by mouth daily (with breakfast)        FREESTYLE LITE test strip   Generic drug:  blood glucose monitoring     200 strip    USE 1 STRIP TO TEST TWICE DAILY.    Uncontrolled type 1 diabetes mellitus with stage 3 chronic kidney disease (H)       furosemide 20 MG tablet    LASIX    90 tablet    Take 1 tablet (20 mg) by mouth daily    Multiple myeloma not having achieved remission (H)       gemfibrozil 600 MG tablet    LOPID    180 tablet    TAKE 1 TABLET BY MOUTH TWICE DAILY    Hyperlipidemia       glipiZIDE 10 MG tablet    GLUCOTROL    180 tablet    Take 1 tablet (10 mg) by mouth daily (with breakfast)    Type 2 diabetes mellitus with stage 3 chronic kidney disease, with long-term current use of insulin (H), Uncontrolled type 2 diabetes mellitus with hyperglycemia, unspecified long term insulin use status (H), Type 2 diabetes mellitus with diabetic chronic kidney disease, unspecified CKD stage, unspecified long term insulin use status (H)       * insulin aspart 100 UNIT/ML injection    NovoLOG FLEXPEN    15 mL    Inject 4-17 Units Subcutaneous 4 times daily (with meals and nightly)    Uncontrolled type 2 diabetes mellitus with hyperglycemia, unspecified long term insulin use status (H), Type 2 diabetes mellitus with diabetic chronic kidney disease, unspecified CKD stage, unspecified long term insulin use status (H), Type 2 diabetes mellitus with stage 3 chronic kidney disease, with long-term current use of insulin (H)       * insulin aspart 100 UNIT/ML injection    NovoLOG PEN    12 mL    Inject 13 units before breakfast, 11 units before lunch, 9 units before dinner, and 4 units at bedtime. Before meal correction: 140-175 add 1 unit  176-210 add 2 units  211-245 add 3 units  246-280 add 4 units  281-315 add 5 units 316-350 add 6 units  351-385 add 7 units 386-420 add 8 units  Over 420 add 9  units    Uncontrolled type 2 diabetes mellitus with hyperglycemia, unspecified long term insulin use status (H), Type 2 diabetes mellitus with diabetic chronic kidney disease, unspecified CKD stage, unspecified long term insulin use status (H), Type 2 diabetes mellitus with stage 3 chronic kidney disease, with long-term current use of insulin (H)       insulin detemir 100 UNIT/ML injection    LEVEMIR FLEXPEN/FLEXTOUCH    15 mL    Inject 16 Units Subcutaneous every morning (before breakfast) On Wednesday and Thursday morning, inject 18 units.    Type 2 diabetes mellitus with diabetic chronic kidney disease, unspecified CKD stage, unspecified long term insulin use status (H), Uncontrolled type 2 diabetes mellitus with hyperglycemia, unspecified long term insulin use status (H), Type 2 diabetes mellitus with stage 3 chronic kidney disease, with long-term current use of insulin (H)       levothyroxine 25 MCG tablet    SYNTHROID/LEVOTHROID    90 tablet    TAKE 1 TABLET BY MOUTH EVERY DAY    Acquired hypothyroidism       * lisinopril 30 MG tablet    PRINIVIL,ZESTRIL    90 tablet    TAKE 1 TABLET BY MOUTH EVERY DAY    Essential hypertension, benign       * lisinopril 30 MG tablet    PRINIVIL,ZESTRIL    90 tablet    Take 0.5 tablets (15 mg) by mouth daily    Essential hypertension, benign       LORazepam 0.5 MG tablet    ATIVAN    30 tablet    Take 1 tablet (0.5 mg) by mouth every 4 hours as needed (Anxiety, Nausea/Vomiting or Sleep)    Multiple myeloma not having achieved remission (H)       metoprolol 25 MG tablet    LOPRESSOR    180 tablet    TAKE 1 TABLET BY MOUTH TWICE DAILY    Essential hypertension       omeprazole 20 MG CR capsule    priLOSEC    90 capsule    TAKE 1 CAPSULE BY MOUTH EVERY DAY    Congenital hiatus hernia       ondansetron 8 MG tablet    ZOFRAN    10 tablet    Take 1 tablet (8 mg) by mouth every 8 hours as needed (Nausea/Vomiting)    Multiple myeloma not having achieved remission (H)        polyethylene glycol powder    MIRALAX/GLYCOLAX          prochlorperazine 10 MG tablet    COMPAZINE    30 tablet    Take 1 tablet (10 mg) by mouth every 6 hours as needed (Nausea/Vomiting)    Multiple myeloma not having achieved remission (H)       Selenium 200 MCG Tabs      Take 100 mcg by mouth daily. Pt takes one half tab of the 200 mcg daily        tamsulosin 0.4 MG capsule    FLOMAX    90 capsule    TAKE 1 CAPSULE BY MOUTH DAILY    Malignant neoplasm of prostate (H)       VITAMIN C PO      Take 1,000 mg by mouth daily        VITAMIN D3 PO      Take 1,000 Units by mouth daily        * Notice:  This list has 8 medication(s) that are the same as other medications prescribed for you. Read the directions carefully, and ask your doctor or other care provider to review them with you.

## 2018-01-10 NOTE — PROGRESS NOTES
"Oncology Rooming Note    January 10, 2018 10:01 AM   Roc Parkinson is a 89 year old male who presents for:    Chief Complaint   Patient presents with     Oncology Clinic Visit     MDS (myelodysplastic syndrome)      Initial Vitals: /65 (BP Location: Left arm)  Pulse 83  Temp 98.8  F (37.1  C) (Oral)  Resp 20  Wt 93.1 kg (205 lb 3.2 oz)  SpO2 93%  BMI 29.44 kg/m2 Estimated body mass index is 29.44 kg/(m^2) as calculated from the following:    Height as of 1/8/18: 1.778 m (5' 10\").    Weight as of this encounter: 93.1 kg (205 lb 3.2 oz). Body surface area is 2.14 meters squared.  No Pain (0) Comment: Data Unavailable   No LMP for male patient.  Allergies reviewed: Yes  Medications reviewed: Yes    Medications: Medication refills not needed today.  Pharmacy name entered into [facility]:    [facility] [place], [place] - [address] [facility] [address] [place] [address] [address] [facility]    Clinical concerns: None                      4 minutes for nursing intake (face to face time)     Kiya Drake MA            "

## 2018-01-11 NOTE — MR AVS SNAPSHOT
After Visit Summary   1/11/2018    Roc Parkinson    MRN: [id number]           Patient Information     Date Of Birth          7/7/1926        Visit Information        Provider Department      1/11/2018 10:30 AM  INFUSION CHAIR 8 [facility]        Today's Diagnoses     Multiple myeloma not having achieved remission (H)    -  1       Follow-ups after your visit        Your next 10 appointments already scheduled     Jan 17, 2018 10:30 AM CST   Level 2 with  INFUSION CHAIR 2   [facility] ([facility])    [facility]  [address]  [place] [address]   [phone]            Jan 18, 2018 10:00 AM CST   Level 2 with  INFUSION CHAIR 18   [facility] ([facility])    [facility]  [address]  [place] [address]   [phone]            Jan 23, 2018 10:30 AM CST   Diabetic Education with  DIABETIC ED RESOURCE   [facility] ([facility])    [address] [place] [id number] [phone]            Jan 31, 2018 11:00 AM CST   Return Visit with  Oncology Nurse   [facility] ([facility])    [facility]  [address]  [place] [address]   [phone]            Jan 31, 2018 11:30 AM CST   Level 1 with  INFUSION CHAIR 16   [facility] ([facility])    [facility]  [address]  [place] [address]   [phone]            Jan 31, 2018 11:45 AM CST   Return Visit with Gretel Quinn MD   [facility] ([facility])    [facility]  [address]  [place] [address]   [phone]            Feb 01, 2018 10:00 AM CST   Level 2 with SH INFUSION  "CHAIR 16   [facility] ([facility])    Critical access hospital [place]  [address]  [place] [address] [phone]            Mar 14, 2018 11:00 AM CDT   Level 1 with SH INFUSION CHAIR 17   [facility] ([facility])    Critical access hospital [place]  [address]  [place] [address] [phone]              Future tests that were ordered for you today     Open Future Orders        Priority Expected Expires Ordered    Echo Complete with Lumason Routine 1/9/2018 1/9/2019 1/9/2018            Who to contact     If you have questions or need follow up information about today's clinic visit or your schedule please contact [facility] [place] [facility] directly at [phone].  Normal or non-critical lab and imaging results will be communicated to you by MyChart, letter or phone within 4 business days after the clinic has received the results. If you do not hear from us within 7 days, please contact the clinic through [employer] or phone. If you have a critical or abnormal lab result, we will notify you by phone as soon as possible.  Submit refill requests through [employer] or call your pharmacy and they will forward the refill request to us. Please allow 3 business days for your refill to be completed.          Additional Information About Your Visit        [employer] Information     [employer]hu lets you send messages to your doctor, view your test results, renew your prescriptions, schedule appointments and more. To sign up, go to [email or web address] . Click on \"Log in\" on the left side of the screen, which will take you to the Welcome page. Then click on \"Sign up Now\" on the right side of the page.     You will be asked to enter the access code listed below, as well as some personal information. Please follow the directions to create your username and password.     Your access code " "is: [email or web address]  Expires: 3/26/2018 11:10 AM     Your access code will  in 90 days. If you need help or a new code, please call your [facility] or [phone].        Care EveryWhere ID     This is your Care EveryWhere ID. This could be used by other organizations to access your [place] medical records  [id number]        Your Vitals Were     Pulse Temperature Respirations Height Pulse Oximetry BMI (Body Mass Index)    67 97.8  F (36.6  C) (Oral) 16 1.778 m (5' 10\") 97% 29.82 kg/m2       Blood Pressure from Last 3 Encounters:   18 157/69   01/10/18 124/65   01/10/18 124/65    Weight from Last 3 Encounters:   18 94.3 kg (207 lb 12.8 oz)   01/10/18 93.1 kg (205 lb 3.2 oz)   01/10/18 93.1 kg (205 lb 3.2 oz)              Today, you had the following     No orders found for display       Primary Care Provider Office Phone # Fax #    Dickson Reich [phone]       [address] [facility] [address]        Equal Access to Services     SANDRA [facility] Center[facility]DELIA : Hadii aad ku hadasho Soomaali, waaxda luqadaha, qaybta kaalmada adeegyada, waxay idiin haymarge shaw . So [facility] [phone].    ATENCIÓN: Si habla español, tiene a nolan disposición servicios gratuitos de asistencia lingüística. ame al [phone].    We comply with applicable federal civil rights laws and [place] laws. We do not discriminate on the basis of race, color, national origin, age, disability, sex, sexual orientation, or gender identity.            Thank you!     Thank you for choosing [facility] AND [facility]  for your care. Our goal is always to provide you with excellent care. Hearing back from our patients is one way we can continue to improve our services. Please take a few minutes to complete the written survey that you may receive in the mail after your visit with us. Thank you!             Your Updated Medication List - Protect others around you: Learn how to safely " use, store and throw away your medicines at www.disposemymeds.org.          This list is accurate as of: 1/11/18 12:50 PM.  Always use your most recent med list.                   Brand Name Dispense Instructions for use Diagnosis    * acyclovir 400 MG tablet    ZOVIRAX    60 tablet    Take 1 tablet (400 mg) by mouth 2 times daily Viral Prophylaxis.    Multiple myeloma not having achieved remission (H)       * acyclovir 400 MG tablet    ZOVIRAX    60 tablet    Take 1 tablet (400 mg) by mouth 2 times daily Viral Prophylaxis.    Multiple myeloma not having achieved remission (H)       amLODIPine 5 MG tablet    NORVASC    90 tablet    TAKE 1 TABLET BY MOUTH DAILY    Essential hypertension, benign       * B-D U/F 31G X 8 MM   Generic drug:  insulin pen needle           * B-D U/F 31G X 8 MM   Generic drug:  insulin pen needle     500 each    USE 5 PEN NEEDLES PER DAY OR AS DIRECTED    Type 2 diabetes mellitus with stage 3 chronic kidney disease, with long-term current use of insulin (H)       dexamethasone 4 MG tablet    DECADRON    40 tablet    Take 5 tablets (20 mg) by mouth with food. Days 1, 2, 8, 9, 15, 16, 22, and 23. On carfilzomib days, take prior to carfilzomib dose.    Multiple myeloma not having achieved remission (H)       ferrous sulfate 325 (65 FE) MG tablet    IRON     Take 325 mg by mouth daily (with breakfast)        FREESTYLE LITE test strip   Generic drug:  blood glucose monitoring     200 strip    USE 1 STRIP TO TEST TWICE DAILY.    Uncontrolled type 1 diabetes mellitus with stage 3 chronic kidney disease (H)       furosemide 20 MG tablet    LASIX    90 tablet    Take 1 tablet (20 mg) by mouth daily    Multiple myeloma not having achieved remission (H)       gemfibrozil 600 MG tablet    LOPID    180 tablet    TAKE 1 TABLET BY MOUTH TWICE DAILY    Hyperlipidemia       glipiZIDE 10 MG tablet    GLUCOTROL    180 tablet    Take 1 tablet (10 mg) by mouth daily (with breakfast)    Type 2 diabetes mellitus  with stage 3 chronic kidney disease, with long-term current use of insulin (H), Uncontrolled type 2 diabetes mellitus with hyperglycemia, unspecified long term insulin use status (H), Type 2 diabetes mellitus with diabetic chronic kidney disease, unspecified CKD stage, unspecified long term insulin use status (H)       * insulin aspart 100 UNIT/ML injection    NovoLOG FLEXPEN    15 mL    Inject 4-17 Units Subcutaneous 4 times daily (with meals and nightly)    Uncontrolled type 2 diabetes mellitus with hyperglycemia, unspecified long term insulin use status (H), Type 2 diabetes mellitus with diabetic chronic kidney disease, unspecified CKD stage, unspecified long term insulin use status (H), Type 2 diabetes mellitus with stage 3 chronic kidney disease, with long-term current use of insulin (H)       * insulin aspart 100 UNIT/ML injection    NovoLOG PEN    12 mL    Inject 13 units before breakfast, 11 units before lunch, 9 units before dinner, and 4 units at bedtime. Before meal correction: 140-175 add 1 unit  176-210 add 2 units  211-245 add 3 units  246-280 add 4 units  281-315 add 5 units 316-350 add 6 units  351-385 add 7 units 386-420 add 8 units  Over 420 add 9 units    Uncontrolled type 2 diabetes mellitus with hyperglycemia, unspecified long term insulin use status (H), Type 2 diabetes mellitus with diabetic chronic kidney disease, unspecified CKD stage, unspecified long term insulin use status (H), Type 2 diabetes mellitus with stage 3 chronic kidney disease, with long-term current use of insulin (H)       insulin detemir 100 UNIT/ML injection    LEVEMIR FLEXPEN/FLEXTOUCH    15 mL    Inject 16 Units Subcutaneous every morning (before breakfast) On Wednesday and Thursday morning, inject 18 units.    Type 2 diabetes mellitus with diabetic chronic kidney disease, unspecified CKD stage, unspecified long term insulin use status (H), Uncontrolled type 2 diabetes mellitus with hyperglycemia, unspecified long term  insulin use status (H), Type 2 diabetes mellitus with stage 3 chronic kidney disease, with long-term current use of insulin (H)       levothyroxine 25 MCG tablet    SYNTHROID/LEVOTHROID    90 tablet    TAKE 1 TABLET BY MOUTH EVERY DAY    Acquired hypothyroidism       * lisinopril 30 MG tablet    PRINIVIL,ZESTRIL    90 tablet    TAKE 1 TABLET BY MOUTH EVERY DAY    Essential hypertension, benign       * lisinopril 30 MG tablet    PRINIVIL,ZESTRIL    90 tablet    Take 0.5 tablets (15 mg) by mouth daily    Essential hypertension, benign       LORazepam 0.5 MG tablet    ATIVAN    30 tablet    Take 1 tablet (0.5 mg) by mouth every 4 hours as needed (Anxiety, Nausea/Vomiting or Sleep)    Multiple myeloma not having achieved remission (H)       metoprolol tartrate 25 MG tablet    LOPRESSOR    180 tablet    TAKE 1 TABLET BY MOUTH TWICE DAILY    Essential hypertension       omeprazole 20 MG CR capsule    priLOSEC    90 capsule    TAKE 1 CAPSULE BY MOUTH EVERY DAY    Congenital hiatus hernia       ondansetron 8 MG tablet    ZOFRAN    10 tablet    Take 1 tablet (8 mg) by mouth every 8 hours as needed (Nausea/Vomiting)    Multiple myeloma not having achieved remission (H)       polyethylene glycol powder    MIRALAX/GLYCOLAX          prochlorperazine 10 MG tablet    COMPAZINE    30 tablet    Take 1 tablet (10 mg) by mouth every 6 hours as needed (Nausea/Vomiting)    Multiple myeloma not having achieved remission (H)       Selenium 200 MCG Tabs      Take 100 mcg by mouth daily. Pt takes one half tab of the 200 mcg daily        tamsulosin 0.4 MG capsule    FLOMAX    90 capsule    TAKE 1 CAPSULE BY MOUTH DAILY    Malignant neoplasm of prostate (H)       VITAMIN C PO      Take 1,000 mg by mouth daily        VITAMIN D3 PO      Take 1,000 Units by mouth daily        * Notice:  This list has 8 medication(s) that are the same as other medications prescribed for you. Read the directions carefully, and ask your doctor or other care  provider to review them with you.

## 2018-01-11 NOTE — PROGRESS NOTES
Infusion Nursing Note:  Roc Parkinson presents today for C1D9 kyprolis.    Patient seen by provider today: No   present during visit today: Not Applicable.    Note: patient stated that he has urinary frequency at night with mild burning. Patient had a ua done through his primary clinic on 1/8/18. Patient stated that he had not heard from the clinic with the results of the ua. Printed results for Joey to review, and verbal orders given to continue with treatment today, and patient will follow up with his primary MD regarding UA results. Joey is also aware of ECHO results from today. Patient verbalized understanding to the above.    Intravenous Access:  Peripheral IV placed.    Treatment Conditions:  Lab Results   Component Value Date    HGB 8.4 01/10/2018     Lab Results   Component Value Date    WBC 3.1 01/10/2018      Lab Results   Component Value Date    ANEU 2.6 01/10/2018     Lab Results   Component Value Date     01/10/2018      Lab Results   Component Value Date     01/10/2018                   Lab Results   Component Value Date    POTASSIUM 4.8 01/10/2018           No results found for: MAG         Lab Results   Component Value Date    CR 1.09 01/10/2018                   Lab Results   Component Value Date    MICHELLE 9.4 01/10/2018                Lab Results   Component Value Date    BILITOTAL 0.5 01/10/2018           Lab Results   Component Value Date    ALBUMIN 2.7 01/10/2018                    Lab Results   Component Value Date    ALT 14 01/10/2018           Lab Results   Component Value Date    AST 13 01/10/2018     Results reviewed, labs MET treatment parameters, ok to proceed with treatment.  ECHO/MUGA completed 1/11/18  EF 65%.      Post Infusion Assessment:  Patient tolerated infusion without incident.  Site patent and intact, free from redness, edema or discomfort.  No evidence of extravasations.  Access discontinued per protocol.    Discharge Plan:   Discharge instructions  reviewed with: Patient.  Patient and/or family verbalized understanding of discharge instructions and all questions answered.  Copy of AVS reviewed with patient and/or family.  Patient will return 1/17/18 for next appointment.  Patient discharged in stable condition accompanied by: self.  Departure Mode: Ambulatory.    Janeen Bravo RN

## 2018-01-11 NOTE — PROGRESS NOTES
[facility] PHYSICIANS  HEMATOLOGY ONCOLOGY     DIAGNOSIS:    1- Kappa light chain IgM multiple myeloma in a 89-year-old patient.  Bone marrow biopsy on 11/17/2016 showed hypercellular bone marrow with 65% cellularity of light chain restricted plasma cells.  There is a background of myelodysplastic syndrome.  Cytogenetics and FISH panel are pending at this point.  The patient was initially seen in the hospital on 11/17/2016 for macrocytic anemia and pancytopenia and transfusion requirement and a bone marrow biopsy was performed.   2- History of prostate cancer s/p EBRT s/p brachytherapy in 2003 (recent PSA 0.10), superficial bladder cancer ,no recurrences since 1986     TREATMENT: 12/15/16 velcade/dex      SUBJECTIVE:  The patient was seen as a followup today. He is tolerating treatment well. He states that his energy level is better. No neuropathy.     REVIEW OF SYSTEMS:  A complete review of systems was performed and found to be negative other than pertinent positives mentioned in history of present illness.     Past medical, social histories reviewed.    Meds- Reviewed.     PHYSICAL EXAMINATION:   VITAL SIGNS:/66  Pulse 65  Temp 98  F (36.7  C) (Oral)  Resp 18  GENERAL: Sitting comfortably.   HEENT: Pupils are equal. Oropharynx is clear.   NECK: No cervical or supraclavicular lymphadenopathy.   LUNGS: Clear bilaterally.   HEART: S1, S2, regular.   ABDOMEN: Soft, nontender, nondistended, no hepatosplenomegaly.   EXTREMITIES: Warm, well perfused.   NEUROLOGIC: Alert, awake.   SKIN: No rash.   LYMPHATICS: No edema.      LABORATORY DATA:   Recent Labs   Lab Test  03/01/17   1020  02/22/17   1045  02/15/17   0910   NA  137   --   135   POTASSIUM  4.9  5.6*  5.4*   CHLORIDE  107   --   107   CO2  21   --   20   ANIONGAP  9   --   8   BUN  34*   --   34*   CR  1.05  0.92  1.05   GLC  288*   --   279*   MICHELLE  9.0  9.5  9.2     Recent Labs   Lab Test  03/08/17   0948  03/01/17   1020  02/22/17   1045  Message   Recorded as Task   Date: 08/18/2017 10:59 AM, Created By: Shantelle Bah   Task Name: Miscellaneous   Assigned To: Tristen Mosquera   Regarding Patient: Sarah Heller, Status: In Progress   Comment:    Shantelle Bah - 18 Aug 2017 10:59 AM     TASK CREATED  Pt lm w/service wanting to cx his procedure for 8/22/17  No reason was given  Thank You! Mariel Delgado - 18 Aug 2017 2:53 PM     TASK EDITED  S/w pt to reschedule trial dates/times  -Hold ASA 9/1/17  -B/W done by 9/2/17 (will fax order to [facility])  -Trial scheduled for 8/31/17 @ 1300 & pt to arrive 1215  -Lead pull scheduled for 9/13/17 @1015 & pt to arrive 1000  Pt aware & appreciative   Naty Moody - 28 Aug 2017 2:55 PM     TASK EDITED  Trial is 9/7/17 arriving @ 1215 for 1300 trial   Email sent to [facility] to inform them  Naty Moody - 28 Aug 2017 2:55 PM     TASK EDITED  Request to hold ASA faxed to Naty Roger - 29 Aug 2017 3:08 PM     TASK EDITED  Received permission to hold ASA, scanned into pt's chart  Active Problems    1  Benign essential hypertension (401 1) (I10)   2  Chronic pain syndrome (338 4) (G89 4)   3  Chronically on opiate therapy (V58 69) (Z79 891)   4  Constipation (564 00) (K59 00)   5  Encounter for prostate cancer screening (V76 44) (Z12 5)   6  Flu vaccine need (V04 81) (Z23)   7  Follicular cyst of skin (706 2) (L72 9)   8  History of prostate cancer (V10 46) (Z85 46)   9  Hypercholesterolemia (272 0) (E78 00)   10  Knee pain (719 46) (M25 569)   11  Left knee pain (719 46) (M25 562)   12  Low back pain (724 2) (M54 5)   13  Lumbago (724 2) (M54 5)   14  Lumbar facet arthropathy (721 3) (M12 88)   15  Lumbar radiculopathy (724 4) (M54 16)   16  Myalgia (729 1) (M79 1)   17  Pain in joint of right shoulder (719 41) (M25 511)   18  Personal history of colon cancer (V10 05) (Z85 038)   19  Primary osteoarthritis of both knees (715 16) (M17 0)   20  Proteinuria (791 0) (R80 9)   21    WBC  3.3*  2.6*  2.5*   HGB  8.5*  7.1*  7.3*   PLT  40*  39*  41*   MCV  101*  101*  102*   NEUTROPHIL  77.4  75.8  75.4     Recent Labs   Lab Test  03/01/17   1020  02/15/17   0910  02/08/17   1300   11/17/16   0938   BILITOTAL  0.4  0.5  0.4   < >   --    ALKPHOS  84  81  76   < >   --    ALT  20  22  22   < >   --    AST  14  18  16   < >   --    ALBUMIN  2.7*  2.6*  2.6*   < >   --    LDH   --    --    --    --   110    < > = values in this interval not displayed.     ECOG PS: 1    ASSESSMENT:  This is a 89-year-old gentleman who has kappa light chain multiple myeloma with hypercellular marrow with 65% of cells are light chain restricted plasma cells.  He has severe pancytopenia and has needed a transfusion in the hospital.  He did not have hypercalcemia and his renal function was normal. He has a background of myelodysplastic syndrome on his bone marrow biopsy; however, majority of the cell population was monoclonal plasma cell population.   He was started on treatment due to cytopenia.   He is on weekly Velcade with dexamethasone 20 mg every week with every week labs and transfusion support at this time.   - Labs were reviewed with the patient today- partial response in terms of a decrease in M spike and light chains.   - He did not have a much change in his cytopenias. There is a possibility that due to background of MDS we may not be able to see significant improvement in his counts.   - I will continue current regimen of Velcade and Dex for now.      PLAN:   1.  Continue Velcade and dexamethasone. Velcade today.  2.  CBC, diff weekly- transfuse to keep hemoglobin of 8 and platelet of 10,000.   3.  RTC MD 4 weeks.   4.  Continue Zometa  5.  Labs today- SPEP, IFXN, light chains    ALISON JON MD    3/15/2017     Psoriasis (696 1) (L40 9)   22  Screening for genitourinary condition (V81 6) (Z13 89)   23  Screening for skin condition (V82 0) (Z13 89)   24  Sleep disturbances (780 50) (G47 9)   25  Slow transit constipation (564 01) (K59 01)   26  Spondylosis of lumbar region without myelopathy or radiculopathy (721 3) (M47 816)   27  Status post surgery (V45 89) (Z98 890)   28  Type 2 diabetes mellitus (250 00) (E11 9)    Current Meds   1  AmLODIPine Besylate 10 MG Oral Tablet; Take 1 tablet daily; Therapy: 10BZX5542 to (Last Rx:07Apr2017)  Requested for: 07Apr2017 Ordered   2  Aspir-81 81 MG Oral Tablet Delayed Release; TAKE 1 TABLET DAILY; Therapy: (Recorded:12Nov2015) to Recorded   3  Calcipotriene 0 005 % External Ointment; APPLY AND GENTLY MASSAGE INTO   AFFECTED AREA(S) ONCE DAILY  Requested for: 53AYH2807; Last Rx:97Tou0714   Ordered   4  ClonazePAM 1 MG Oral Tablet; Therapy: 54OSL6455 to (Evaluate:13Jun2015) Recorded   5  Docusate Sodium 100 MG Oral Capsule; TAKE 1 CAPSULE TWICE DAILY; Therapy: 57QCR8823 to (Ranjan Mass)  Requested for: 08BLL4952; Last   Rx:71Dpk0110 Ordered   6  Fish Oil 1200 MG Oral Capsule; Take 1 twice daily; Therapy: (Recorded:12Nov2015) to Recorded   7  LORazepam 0 5 MG Oral Tablet; Therapy: 17ICO8927 to (Evaluate:60Kmr0541) Recorded   8  Losartan Potassium 100 MG Oral Tablet; Take 1 tablet daily; Therapy: 28KQO3079 to (Last Rx:19Jun2017)  Requested for: 08AHB8130 Ordered   9  MetFORMIN HCl - 500 MG Oral Tablet; take 1 tablet daily in pm;   Therapy: 11LDT6939 to (Last Rx:06Yni2124)  Requested for: 98Ibv8940 Ordered   10  Proventil  (90 Base) MCG/ACT Inhalation Aerosol Solution; INHALE 2    INHALIATIONS BY MOUTH WHEN NEEDED  Requested for: 20Apr2016; Last    Rx:20Apr2016 Ordered   11  Ramipril 10 MG Oral Capsule; Take 2 capsules daily; Therapy: 75MBG3933 to (Last Rx:19Jun2017)  Requested for: 46SKS9846 Ordered   12   Rosuvastatin Calcium 10 MG Oral Tablet (Crestor); TAKE 1 TABLET DAILY; Therapy: 19LYS2736 to (Evaluate:10Ubs4327); Last Rx:71Ffk4402 Ordered   13  TraMADol HCl - 50 MG Oral Tablet; take 2 tablet twice daily; Therapy: 66UJF3079 to (Evaluate:09Oct2017); Last Rx:48Zpn4086 Ordered   14  Vitamin D 1000 UNIT Oral Tablet; TAKE 1 TABLET DAILY; Therapy: (Recorded:12Nov2015) to Recorded    Allergies    1   No Known Drug Allergies    Signatures   Electronically signed by : Lenny Jovel, ; Aug 29 2017  3:08PM EST                       (Author)

## 2018-01-16 NOTE — TELEPHONE ENCOUNTER
"Requested Prescriptions   Pending Prescriptions Disp Refills     lisinopril (PRINIVIL,ZESTRIL) 30 MG tablet [Pharmacy Med Name: LISINOPRIL 30MG TABLETS]  Last Written Prescription Date:  11/21/2017  Last Fill Quantity: 90 tablet,  # refills: 0   Last Office Visit  1/8/2018        with  [facility], [facility] or  "[facility] prescribing provider:     Future Office Visit:    Next 5 appointments (look out 90 days)     Jan 31, 2018 11:00 AM CST   Return Visit with  Oncology Nurse   [facility]    [facility] [place] [id number]  [place] [address]   [phone]            Jan 31, 2018 11:45 AM CST   Return Visit with Gretel Quinn MD   [facility] ([facility])    [facility]  [address]  [place] [address]   [phone]                90 tablet 0     Sig: TAKE 1 TABLET BY MOUTH EVERY DAY    ACE Inhibitors (Including Combos) Protocol Failed    1/14/2018  3:13 AM       Failed - Blood pressure under 140/90    BP Readings from Last 3 Encounters:   01/11/18 166/83   01/10/18 124/65   01/10/18 124/65          Passed - Recent or future visit with authorizing provider's specialty    Patient had office visit in the last year or has a visit in the next 30 days with authorizing provider.  See \"Patient Info\" tab in inbasket, or \"Choose Columns\" in Meds & Orders section of the refill encounter.        Passed - Patient is age 18 or older       Passed - Normal serum creatinine on file in past 12 months    Recent Labs   Lab Test  01/10/18   0951   CR  1.09          Passed - Normal serum potassium on file in past 12 months    Recent Labs   Lab Test  01/10/18   0951   POTASSIUM  4.8               "

## 2018-01-17 NOTE — PROGRESS NOTES
Infusion Nursing Note:  Roc Parkinson presents today for C1D15 Kyprolis.    Patient seen by provider today: No   present during visit today: Not Applicable.    Note: N/A.    Intravenous Access:  Labs drawn without difficulty.  Peripheral IV placed.    Treatment Conditions:  Lab Results   Component Value Date    HGB 8.4 01/17/2018     Lab Results   Component Value Date    WBC 5.9 01/17/2018      Lab Results   Component Value Date    ANEU 5.1 01/17/2018     Lab Results   Component Value Date    PLT 92 01/17/2018      Results reviewed, labs MET treatment parameters, ok to proceed with treatment.        Post Infusion Assessment:  Patient tolerated infusion without incident.  Blood return noted pre and post infusion.  Site patent and intact, free from redness, edema or discomfort.  No evidence of extravasations.  IV left in place and wrapped in coban for infusion tomorrow.      Discharge Plan:   Patient declined prescription refills.  Discharge instructions reviewed with: Patient.  Patient and/or family verbalized understanding of discharge instructions and all questions answered.  Copy of AVS reviewed with patient and/or family.  Patient will return 1/18/18 for next appointment.  Patient discharged in stable condition accompanied by: self.  Departure Mode: Ambulatory.    KENDALL Padron RN                      \

## 2018-01-17 NOTE — MR AVS SNAPSHOT
After Visit Summary   1/17/2018    Roc Parkinson    MRN: [id number]           Patient Information     Date Of Birth          7/7/1926        Visit Information        Provider Department      1/17/2018 10:30 AM  INFUSION CHAIR 2 [facility]        Today's Diagnoses     Multiple myeloma not having achieved remission (H)    -  1       Follow-ups after your visit        Your next 10 appointments already scheduled     Jan 18, 2018 10:00 AM CST   Level 2 with  INFUSION CHAIR 18   [facility] ([facility]    [facility]  [address]  [place] [address]   [phone]            Jan 23, 2018 10:30 AM CST   Diabetic Education with  DIABETIC ED RESOURCE   [facility] ([address] [address]   [phone]            Jan 31, 2018 11:00 AM CST   Return Visit with  Oncology Nurse   [facility] ([facility])    [facility] [place] [id number]  [place] [address]   [phone]            Jan 31, 2018 11:30 AM CST   Level 1 with  INFUSION CHAIR 16   [facility] ([facility]    [facility]  [address]  [place] [address]   [phone]            Jan 31, 2018 11:45 AM CST   Return Visit with Gretel Quinn MD   [facility]    [facility]  [address]  [place] [address]   [phone]            Feb 01, 2018 10:00 AM CST   Level 2 with  INFUSION CHAIR 16   [facility]    [facility]  [address]  [place] [address]   [phone]            Feb 07, 2018 10:30 AM CST   Level 2 with SH INFUSION  "CHAIR 6   [facility] ([facility])    [facility]  [address]  [place] [address]   [phone]            Feb 08, 2018 10:30 AM CST   Level 2 with SH INFUSION CHAIR 14   [facility] ([facility])    [facility] [place]  [address]  [place] [address]   [phone]            Mar 14, 2018 11:00 AM CDT   Level 1 with SH INFUSION CHAIR 17   [facility] ([facility])    [facility]  [address]  [place] [address]   [phone]              Who to contact     If you have questions or need follow up information about today's clinic visit or your schedule please contact [facility] directly at [phone].  Normal or non-critical lab and imaging results will be communicated to you by [employer], letter or phone within 4 business days after the clinic has received the results. If you do not hear from us within 7 days, please contact the clinic through [employer] or phone. If you have a critical or abnormal lab result, we will notify you by phone as soon as possible.  Submit refill requests through [employer] or call your pharmacy and they will forward the refill request to us. Please allow 3 business days for your refill to be completed.          Additional Information About Your Visit        [employer] Information     [employer] lets you send messages to your doctor, view your test results, renew your prescriptions, schedule appointments and more. To sign up, go to [email or web address] . Click on \"Log in\" on the left side of the screen, which will take you to the Welcome page. Then click on \"Sign up Now\" on the right side of the page.     You will be asked to enter the access code listed below, as well as some personal information. Please follow the directions " "to create your username and password.     Your access code is: [email or web address]  Expires: 3/26/2018 11:10 AM     Your access code will  in 90 days. If you need help or a new code, please call your [facility] or [phone].        Care EveryWhere ID     This is your Care EveryWhere ID. This could be used by other organizations to access your [facility] medical records  [id number]        Your Vitals Were     Pulse Temperature Respirations Height BMI (Body Mass Index)       64 97.6  F (36.4  C) (Oral) 16 1.778 m (5' 10\") 29.56 kg/m2        Blood Pressure from Last 3 Encounters:   18 141/67   18 166/83   01/10/18 124/65    Weight from Last 3 Encounters:   18 93.4 kg (206 lb)   18 94.3 kg (207 lb 12.8 oz)   01/10/18 93.1 kg (205 lb 3.2 oz)              We Performed the Following     CBC with platelets differential        Primary Care Provider Office Phone # Fax #    Dickson Reich [phone]       [address] [facility] [address]        Equal Access to Services     SANDRA GAGNON : Hadii [place] ku hadasho Soomaali, waaxda luqadaha, qaybta kaalmada adeegyada, waxay idiin haymarge rodriguez. So [facility] [phone].    ATENCIÓN: Si habla español, tiene a nolan disposición servicios gratuitos de asistencia lingüística. nadine al [phone].    We comply with applicable federal civil rights laws and [place] laws. We do not discriminate on the basis of race, color, national origin, age, disability, sex, sexual orientation, or gender identity.            Thank you!     Thank you for choosing [facility]  for your care. Our goal is always to provide you with excellent care. Hearing back from our patients is one way we can continue to improve our services. Please take a few minutes to complete the written survey that you may receive in the mail after your visit with us. Thank you!             Your Updated Medication List - Protect " others around you: Learn how to safely use, store and throw away your medicines at www.disposemymeds.org.          This list is accurate as of: 1/17/18  1:35 PM.  Always use your most recent med list.                   Brand Name Dispense Instructions for use Diagnosis    * acyclovir 400 MG tablet    ZOVIRAX    60 tablet    Take 1 tablet (400 mg) by mouth 2 times daily Viral Prophylaxis.    Multiple myeloma not having achieved remission (H)       * acyclovir 400 MG tablet    ZOVIRAX    60 tablet    Take 1 tablet (400 mg) by mouth 2 times daily Viral Prophylaxis.    Multiple myeloma not having achieved remission (H)       amLODIPine 5 MG tablet    NORVASC    90 tablet    TAKE 1 TABLET BY MOUTH DAILY    Essential hypertension, benign       * B-D U/F 31G X 8 MM   Generic drug:  insulin pen needle           * B-D U/F 31G X 8 MM   Generic drug:  insulin pen needle     500 each    USE 5 PEN NEEDLES PER DAY OR AS DIRECTED    Type 2 diabetes mellitus with stage 3 chronic kidney disease, with long-term current use of insulin (H)       dexamethasone 4 MG tablet    DECADRON    40 tablet    Take 5 tablets (20 mg) by mouth with food. Days 1, 2, 8, 9, 15, 16, 22, and 23. On carfilzomib days, take prior to carfilzomib dose.    Multiple myeloma not having achieved remission (H)       ferrous sulfate 325 (65 FE) MG tablet    IRON     Take 325 mg by mouth daily (with breakfast)        FREESTYLE LITE test strip   Generic drug:  blood glucose monitoring     200 strip    USE 1 STRIP TO TEST TWICE DAILY.    Uncontrolled type 1 diabetes mellitus with stage 3 chronic kidney disease (H)       furosemide 20 MG tablet    LASIX    90 tablet    Take 1 tablet (20 mg) by mouth daily    Multiple myeloma not having achieved remission (H)       gemfibrozil 600 MG tablet    LOPID    180 tablet    TAKE 1 TABLET BY MOUTH TWICE DAILY    Hyperlipidemia       glipiZIDE 10 MG tablet    GLUCOTROL    180 tablet    Take 1 tablet (10 mg) by mouth daily (with  breakfast)    Type 2 diabetes mellitus with stage 3 chronic kidney disease, with long-term current use of insulin (H), Uncontrolled type 2 diabetes mellitus with hyperglycemia, unspecified long term insulin use status (H), Type 2 diabetes mellitus with diabetic chronic kidney disease, unspecified CKD stage, unspecified long term insulin use status (H)       * insulin aspart 100 UNIT/ML injection    NovoLOG FLEXPEN    15 mL    Inject 4-17 Units Subcutaneous 4 times daily (with meals and nightly)    Uncontrolled type 2 diabetes mellitus with hyperglycemia, unspecified long term insulin use status (H), Type 2 diabetes mellitus with diabetic chronic kidney disease, unspecified CKD stage, unspecified long term insulin use status (H), Type 2 diabetes mellitus with stage 3 chronic kidney disease, with long-term current use of insulin (H)       * insulin aspart 100 UNIT/ML injection    NovoLOG PEN    12 mL    Inject 13 units before breakfast, 11 units before lunch, 9 units before dinner, and 4 units at bedtime. Before meal correction: 140-175 add 1 unit  176-210 add 2 units  211-245 add 3 units  246-280 add 4 units  281-315 add 5 units 316-350 add 6 units  351-385 add 7 units 386-420 add 8 units  Over 420 add 9 units    Uncontrolled type 2 diabetes mellitus with hyperglycemia, unspecified long term insulin use status (H), Type 2 diabetes mellitus with diabetic chronic kidney disease, unspecified CKD stage, unspecified long term insulin use status (H), Type 2 diabetes mellitus with stage 3 chronic kidney disease, with long-term current use of insulin (H)       insulin detemir 100 UNIT/ML injection    LEVEMIR FLEXPEN/FLEXTOUCH    15 mL    Inject 16 Units Subcutaneous every morning (before breakfast) On Wednesday and Thursday morning, inject 18 units.    Type 2 diabetes mellitus with diabetic chronic kidney disease, unspecified CKD stage, unspecified long term insulin use status (H), Uncontrolled type 2 diabetes mellitus with  hyperglycemia, unspecified long term insulin use status (H), Type 2 diabetes mellitus with stage 3 chronic kidney disease, with long-term current use of insulin (H)       levothyroxine 25 MCG tablet    SYNTHROID/LEVOTHROID    90 tablet    TAKE 1 TABLET BY MOUTH EVERY DAY    Acquired hypothyroidism       * lisinopril 30 MG tablet    PRINIVIL,ZESTRIL    90 tablet    TAKE 1 TABLET BY MOUTH EVERY DAY    Essential hypertension, benign       * lisinopril 30 MG tablet    PRINIVIL,ZESTRIL    90 tablet    Take 0.5 tablets (15 mg) by mouth daily    Essential hypertension, benign       * lisinopril 30 MG tablet    PRINIVIL,ZESTRIL    90 tablet    TAKE 1 TABLET BY MOUTH EVERY DAY    Essential hypertension, benign       LORazepam 0.5 MG tablet    ATIVAN    30 tablet    Take 1 tablet (0.5 mg) by mouth every 4 hours as needed (Anxiety, Nausea/Vomiting or Sleep)    Multiple myeloma not having achieved remission (H)       metoprolol tartrate 25 MG tablet    LOPRESSOR    180 tablet    TAKE 1 TABLET BY MOUTH TWICE DAILY    Essential hypertension       omeprazole 20 MG CR capsule    priLOSEC    90 capsule    TAKE 1 CAPSULE BY MOUTH EVERY DAY    Congenital hiatus hernia       ondansetron 8 MG tablet    ZOFRAN    10 tablet    Take 1 tablet (8 mg) by mouth every 8 hours as needed (Nausea/Vomiting)    Multiple myeloma not having achieved remission (H)       polyethylene glycol powder    MIRALAX/GLYCOLAX          prochlorperazine 10 MG tablet    COMPAZINE    30 tablet    Take 1 tablet (10 mg) by mouth every 6 hours as needed (Nausea/Vomiting)    Multiple myeloma not having achieved remission (H)       Selenium 200 MCG Tabs      Take 100 mcg by mouth daily. Pt takes one half tab of the 200 mcg daily        tamsulosin 0.4 MG capsule    FLOMAX    90 capsule    TAKE 1 CAPSULE BY MOUTH DAILY    Malignant neoplasm of prostate (H)       VITAMIN C PO      Take 1,000 mg by mouth daily        VITAMIN D3 PO      Take 1,000 Units by mouth daily         * Notice:  This list has 9 medication(s) that are the same as other medications prescribed for you. Read the directions carefully, and ask your doctor or other care provider to review them with you.

## 2018-01-18 NOTE — MR AVS SNAPSHOT
After Visit Summary   1/18/2018    Roc Parkinson    MRN: [id number]           Patient Information     Date Of Birth          7/7/1926        Visit Information        Provider Department      1/18/2018 10:00 AM  INFUSION CHAIR 18 [facility]        Today's Diagnoses     Multiple myeloma not having achieved remission (H)    -  1       Follow-ups after your visit        Your next 10 appointments already scheduled     Jan 23, 2018 10:30 AM CST   Diabetic Education with  DIABETIC ED RESOURCE   [facility] ([address] [address]   [phone]            Jan 31, 2018 11:00 AM CST   Return Visit with  Oncology Nurse   [facility] ([facility])    [facility]  [address]  [place] [address]   [phone]            Jan 31, 2018 11:30 AM CST   Level 1 with  INFUSION CHAIR 16   [facility] ([facility])    [facility]  [address]  [place] [address]   [phone]            Jan 31, 2018 11:45 AM CST   Return Visit with Gretel Quinn MD   [facility] ([facility])    [facility]  [address]  [place] [address]   [phone]            Feb 01, 2018 10:00 AM CST   Level 2 with  INFUSION CHAIR 16   [facility] ([facility])    [facility]  [address]  [place] [address]   [phone]            Feb 07, 2018 10:30 AM CST   Level 2 with  INFUSION CHAIR 6   [facility] ([facility])    [facility]  [address]  [place] [address]   [phone]            Feb 08, 2018 10:30 AM CST   Level 2 with SH INFUSION  "CHAIR 14   [facility] ([facility])    [facility]  [address]  [place] [address]   [phone]            Mar 13, 2018 11:00 AM CDT   Level 2 with SH INFUSION CHAIR 3   [facility] [place] [facility] ([facility])    [facility]  [address]  [place] [address]   [phone]            Mar 14, 2018 11:00 AM CDT   Level 1 with SH INFUSION CHAIR 17   [facility] [place] [facility] ([facility])    [facility]  [address]  [place] [address] [phone]              Who to contact     If you have questions or need follow up information about today's clinic visit or your schedule please contact [facility] directly at [phone].  Normal or non-critical lab and imaging results will be communicated to you by Tingzhart, letter or phone within 4 business days after the clinic has received the results. If you do not hear from us within 7 days, please contact the clinic through Drop â€™til you Shopt or phone. If you have a critical or abnormal lab result, we will notify you by phone as soon as possible.  Submit refill requests through 21via[employer] or call your pharmacy and they will forward the refill request to us. Please allow 3 business days for your refill to be completed.          Additional Information About Your Visit        [employer] Information     [employer] lets you send messages to your doctor, view your test results, renew your prescriptions, schedule appointments and more. To sign up, go to [email or web address] . Click on \"Log in\" on the left side of the screen, which will take you to the Welcome page. Then click on \"Sign up Now\" on the right side of the page.     You will be asked to enter the access code listed below, as well as some personal information. Please follow the directions " "to create your username and password.     Your access code is: [email or web address]  Expires: 3/26/2018 11:10 AM     Your access code will  in 90 days. If you need help or a new code, please call your [facility] or [phone].        Care EveryWhere ID     This is your Care EveryWhere ID. This could be used by other organizations to access your [facility] medical records  [id number]        Your Vitals Were     Pulse Temperature Respirations Height Pulse Oximetry BMI (Body Mass Index)    70 97.8  F (36.6  C) (Oral) 18 1.778 m (5' 10\") 96% 29.54 kg/m2       Blood Pressure from Last 3 Encounters:   18 176/77   18 141/67   18 166/83    Weight from Last 3 Encounters:   18 93.4 kg (205 lb 14.6 oz)   18 93.4 kg (206 lb)   18 94.3 kg (207 lb 12.8 oz)              Today, you had the following     No orders found for display       Primary Care Provider Office Phone # Fax #    Dickson Reich [phone]       [address] [facility] [address]        Equal Access to Services     ELIE GAGNON AH: Hadii [place] ku hadasho Soomaali, waaxda luqadaha, qaybta kaalmada adeegyada, waxay idiin hayaajanny shaw . So [facility] [phone].    ATENCIÓN: Si habla español, tiene a nolan disposición servicios gratuitos de asistencia lingüística. [facility] [phone].    We comply with applicable federal civil rights laws and [place] laws. We do not discriminate on the basis of race, color, national origin, age, disability, sex, sexual orientation, or gender identity.            Thank you!     Thank you for choosing [facility]  for your care. Our goal is always to provide you with excellent care. Hearing back from our patients is one way we can continue to improve our services. Please take a few minutes to complete the written survey that you may receive in the mail after your visit with us. Thank you!             Your Updated Medication " List - Protect others around you: Learn how to safely use, store and throw away your medicines at www.disposemymeds.org.          This list is accurate as of: 1/18/18 10:49 AM.  Always use your most recent med list.                   Brand Name Dispense Instructions for use Diagnosis    acyclovir 400 MG tablet    ZOVIRAX    60 tablet    Take 1 tablet (400 mg) by mouth 2 times daily Viral Prophylaxis.    Multiple myeloma not having achieved remission (H)       amLODIPine 5 MG tablet    NORVASC    90 tablet    TAKE 1 TABLET BY MOUTH DAILY    Essential hypertension, benign       * B-D U/F 31G X 8 MM   Generic drug:  insulin pen needle           * B-D U/F 31G X 8 MM   Generic drug:  insulin pen needle     500 each    USE 5 PEN NEEDLES PER DAY OR AS DIRECTED    Type 2 diabetes mellitus with stage 3 chronic kidney disease, with long-term current use of insulin (H)       dexamethasone 4 MG tablet    DECADRON    40 tablet    Take 5 tablets (20 mg) by mouth with food. Days 1, 2, 8, 9, 15, 16, 22, and 23. On carfilzomib days, take prior to carfilzomib dose.    Multiple myeloma not having achieved remission (H)       ferrous sulfate 325 (65 FE) MG tablet    IRON     Take 325 mg by mouth daily (with breakfast)        FREESTYLE LITE test strip   Generic drug:  blood glucose monitoring     200 strip    USE 1 STRIP TO TEST TWICE DAILY.    Uncontrolled type 1 diabetes mellitus with stage 3 chronic kidney disease (H)       furosemide 20 MG tablet    LASIX    90 tablet    Take 1 tablet (20 mg) by mouth daily    Multiple myeloma not having achieved remission (H)       gemfibrozil 600 MG tablet    LOPID    180 tablet    TAKE 1 TABLET BY MOUTH TWICE DAILY    Hyperlipidemia       glipiZIDE 10 MG tablet    GLUCOTROL    180 tablet    Take 1 tablet (10 mg) by mouth daily (with breakfast)    Type 2 diabetes mellitus with stage 3 chronic kidney disease, with long-term current use of insulin (H), Uncontrolled type 2 diabetes mellitus with  hyperglycemia, unspecified long term insulin use status (H), Type 2 diabetes mellitus with diabetic chronic kidney disease, unspecified CKD stage, unspecified long term insulin use status (H)       * insulin aspart 100 UNIT/ML injection    NovoLOG FLEXPEN    15 mL    Inject 4-17 Units Subcutaneous 4 times daily (with meals and nightly)    Uncontrolled type 2 diabetes mellitus with hyperglycemia, unspecified long term insulin use status (H), Type 2 diabetes mellitus with diabetic chronic kidney disease, unspecified CKD stage, unspecified long term insulin use status (H), Type 2 diabetes mellitus with stage 3 chronic kidney disease, with long-term current use of insulin (H)       * insulin aspart 100 UNIT/ML injection    NovoLOG PEN    12 mL    Inject 13 units before breakfast, 11 units before lunch, 9 units before dinner, and 4 units at bedtime. Before meal correction: 140-175 add 1 unit  176-210 add 2 units  211-245 add 3 units  246-280 add 4 units  281-315 add 5 units 316-350 add 6 units  351-385 add 7 units 386-420 add 8 units  Over 420 add 9 units    Uncontrolled type 2 diabetes mellitus with hyperglycemia, unspecified long term insulin use status (H), Type 2 diabetes mellitus with diabetic chronic kidney disease, unspecified CKD stage, unspecified long term insulin use status (H), Type 2 diabetes mellitus with stage 3 chronic kidney disease, with long-term current use of insulin (H)       insulin detemir 100 UNIT/ML injection    LEVEMIR FLEXPEN/FLEXTOUCH    15 mL    Inject 16 Units Subcutaneous every morning (before breakfast) On Wednesday and Thursday morning, inject 18 units.    Type 2 diabetes mellitus with diabetic chronic kidney disease, unspecified CKD stage, unspecified long term insulin use status (H), Uncontrolled type 2 diabetes mellitus with hyperglycemia, unspecified long term insulin use status (H), Type 2 diabetes mellitus with stage 3 chronic kidney disease, with long-term current use of insulin  (H)       levothyroxine 25 MCG tablet    SYNTHROID/LEVOTHROID    90 tablet    TAKE 1 TABLET BY MOUTH EVERY DAY    Acquired hypothyroidism       * lisinopril 30 MG tablet    PRINIVIL,ZESTRIL    90 tablet    TAKE 1 TABLET BY MOUTH EVERY DAY    Essential hypertension, benign       * lisinopril 30 MG tablet    PRINIVIL,ZESTRIL    90 tablet    Take 0.5 tablets (15 mg) by mouth daily    Essential hypertension, benign       * lisinopril 30 MG tablet    PRINIVIL,ZESTRIL    90 tablet    TAKE 1 TABLET BY MOUTH EVERY DAY    Essential hypertension, benign       LORazepam 0.5 MG tablet    ATIVAN    30 tablet    Take 1 tablet (0.5 mg) by mouth every 4 hours as needed (Anxiety, Nausea/Vomiting or Sleep)    Multiple myeloma not having achieved remission (H)       metoprolol tartrate 25 MG tablet    LOPRESSOR    180 tablet    TAKE 1 TABLET BY MOUTH TWICE DAILY    Essential hypertension       omeprazole 20 MG CR capsule    priLOSEC    90 capsule    TAKE 1 CAPSULE BY MOUTH EVERY DAY    Congenital hiatus hernia       ondansetron 8 MG tablet    ZOFRAN    10 tablet    Take 1 tablet (8 mg) by mouth every 8 hours as needed (Nausea/Vomiting)    Multiple myeloma not having achieved remission (H)       polyethylene glycol powder    MIRALAX/GLYCOLAX          prochlorperazine 10 MG tablet    COMPAZINE    30 tablet    Take 1 tablet (10 mg) by mouth every 6 hours as needed (Nausea/Vomiting)    Multiple myeloma not having achieved remission (H)       Selenium 200 MCG Tabs      Take 100 mcg by mouth daily. Pt takes one half tab of the 200 mcg daily        tamsulosin 0.4 MG capsule    FLOMAX    90 capsule    TAKE 1 CAPSULE BY MOUTH DAILY    Malignant neoplasm of prostate (H)       VITAMIN C PO      Take 1,000 mg by mouth daily        VITAMIN D3 PO      Take 1,000 Units by mouth daily        * Notice:  This list has 7 medication(s) that are the same as other medications prescribed for you. Read the directions carefully, and ask your doctor or other  care provider to review them with you.

## 2018-01-18 NOTE — PROGRESS NOTES
Infusion Nursing Note:  Roc Parkinson presents today for C1 D16 Kyprolis.    Patient seen by provider today: No   present during visit today: Not Applicable.    Note: Patient reports no new concerns since yesterday's infusion. Verified patient took his oral dexamethasone today as ordered and does not need a refill at this time.    Intravenous Access:  Peripheral IV in place from yesterday, flushes with ease.    Treatment Conditions:  No labs ordered for today, no treatment parameters. Lab results reviewed from 1/17, met parameters at that time.      Post Infusion Assessment:  Patient tolerated infusion without incident.  Blood return noted pre and post infusion.  Site patent and intact, free from redness, edema or discomfort.  No evidence of extravasations.  Access discontinued per protocol.    Discharge Plan:   Discharge instructions reviewed with: Patient.  Patient and/or family verbalized understanding of discharge instructions and all questions answered.  Copy of AVS reviewed with patient and/or family.  Patient will return 1/31 for next appointment.  Patient discharged in stable condition accompanied by: self.  Departure Mode: Ambulatory.    Eliana Carpenter RN

## 2018-01-20 NOTE — ED NOTES
"Pt's EKG was not able to be obtained due to pt need tech to physically stand and capture his \"episodes\" however due to staffing waiting for this is not an option. ED MD was made aware of this. Monitor paper was replaced to see if we can capture this on monitor strip.  "

## 2018-01-20 NOTE — IP AVS SNAPSHOT
"          [place] [address] [facility]: [phone]                                              INTERAGENCY TRANSFER FORM - LAB / IMAGING / EKG / EMG RESULTS   2018                    Hospital Admission Date: 2018  JACOB MEDELLIN   : 1926  Sex: Male        Attending Provider: Martha Bobby MD     Allergies:  No Known Allergies    Infection:  ESBL   Service:  HOSPITALIST    Ht:  1.753 m (5' 9\")   Wt:  91.5 kg (201 lb 11.5 oz)   Admission Wt:  90.7 kg (200 lb)    BMI:  29.79 kg/m 2   BSA:  2.11 m 2            Patient PCP Information     Provider PCP Type    Dickson Reich MD General         Lab Results - 3 Days      Glucose by meter [[id number]]  Resulted: 18 1527, Result status: Final result    Ordering provider: Martha Bobby MD  18 1523 Resulting lab: POINT OF CARE TEST, GLUCOSE    Specimen Information    Type Source Collected On     18 1523          Components       Value Reference Range Flag Lab   Glucose 72 70 - 99 mg/dL  170            Glucose by meter [[id number]] (Abnormal)  Resulted: 18 1515, Result status: Final result    Ordering provider: Martha Bobby MD  18 1459 Resulting lab: POINT OF CARE TEST, GLUCOSE    Specimen Information    Type Source Collected On     18 1459          Components       Value Reference Range Flag Lab   Glucose 61 70 - 99 mg/dL L 170            Glucose by meter [[id number]]  Resulted: 18 1256, Result status: Final result    Ordering provider: Martha Bobby MD  18 1223 Resulting lab: POINT OF CARE TEST, GLUCOSE    Specimen Information    Type Source Collected On     18 1223          Components       Value Reference Range Flag Lab   Glucose 79 70 - 99 mg/dL  170            Basic metabolic panel [[id number]]  Resulted: 18 0926, Result status: Final result    Ordering provider: Kellee Melo MD  18 0001 Resulting lab: [facility]    " Specimen Information    Type Source Collected On   Blood  01/23/18 0850          Components       Value Reference Range Flag Lab   Sodium 139 133 - 144 mmol/L  FrStHsLb   Potassium 4.0 3.4 - 5.3 mmol/L  FrStHsLb   Chloride 107 94 - 109 mmol/L  FrStHsLb   Carbon Dioxide 24 20 - 32 mmol/L  FrStHsLb   Anion Gap 8 3 - 14 mmol/L  FrStHsLb   Glucose 84 70 - 99 mg/dL  FrStHsLb   Urea Nitrogen 24 7 - 30 mg/dL  FrStHsLb   Creatinine 0.99 0.66 - 1.25 mg/dL  FrStHsLb   GFR Estimate 70 >60 mL/min/1.7m2  FrStHsLb   Comment:  Non  GFR Calc   GFR Estimate If Black 85 >60 mL/min/1.7m2  FrStHsLb   Comment:  African American GFR Calc   Calcium 9.3 8.5 - 10.1 mg/dL  FrStHsLb            CBC with platelets [[id number]] (Abnormal)  Resulted: 01/23/18 0906, Result status: Final result    Ordering provider: Kellee Melo MD  01/23/18 0001 Resulting lab: [facility]    Specimen Information    Type Source Collected On   Blood  01/23/18 0850          Components       Value Reference Range Flag Lab   WBC 4.6 4.0 - 11.0 10e9/L  FrStHsLb   RBC Count 2.32 4.4 - 5.9 10e12/L L FrStHsLb   Hemoglobin 8.6 13.3 - 17.7 g/dL L FrStHsLb   Hematocrit 25.8 40.0 - 53.0 % L FrStHsLb    78 - 100 fl H FrStHsLb   MCH 37.1 26.5 - 33.0 pg H FrStHsLb   MCHC 33.3 31.5 - 36.5 g/dL  FrStHsLb   RDW 19.0 10.0 - 15.0 % H FrStHsLb   Platelet Count 63 150 - 450 10e9/L L FrStHsLb            Urine Culture Aerobic Bacterial [[id number]] (Abnormal)  Resulted: 01/23/18 0806, Result status: Final result    Ordering provider: Parminder Pierce MD  01/20/18 0314 Resulting lab: [facility]    Specimen Information    Type Source Collected On   Midstream Urine  01/20/18 0314          Components       Value Reference Range Flag Lab   Specimen Description Midstream Urine      Culture Micro --  A 225   Result:         >100,000 colonies/mL  Escherichia coli ESBL     Culture Micro --  A 225   Result:         >100,000  colonies/mL  Strain 2  Escherichia coli ESBL     Culture Micro Enterobacteriaceae that are susceptible to meropenem are usually susceptible to ertapenem.   225   Result:     Culture Micro ESBL (extended beta lactamase) producing organisms require contact precautions.   225            Glucose by meter [[id number]]  Resulted: 01/23/18 0801, Result status: Final result    Ordering provider: Martha Bobby MD  01/23/18 0750 Resulting lab: POINT OF CARE TEST, GLUCOSE    Specimen Information    Type Source Collected On     01/23/18 0750          Components       Value Reference Range Flag Lab   Glucose 78 70 - 99 mg/dL  170            Blood culture [[id number]]  Resulted: 01/23/18 0257, Result status: Preliminary result    Ordering provider: Parminder Pierce MD  01/20/18 0413 Resulting lab: [facility]    Specimen Information    Type Source Collected On   Blood Arm, Right 01/20/18 0439   Comment:  Right Hand          Components       Value Reference Range Flag Lab   Specimen Description Blood Right Hand      Special Requests Aerobic and anaerobic bottles received   FrStHsLb   Culture Micro No growth after 3 days   75            Blood culture [[id number]]  Resulted: 01/23/18 0257, Result status: Preliminary result    Ordering provider: Parminder Pierce MD  01/20/18 0413 Resulting lab: [facility]    Specimen Information    Type Source Collected On   Blood Arm, Forearm Only, Left 01/20/18 0424   Comment:  Left Arm          Components       Value Reference Range Flag Lab   Specimen Description Blood Left Arm      Special Requests Aerobic and anaerobic bottles received   FrStHsLb   Culture Micro No growth after 3 days   75            Glucose by meter [[id number]]  Resulted: 01/23/18 0241, Result status: Final result    Ordering provider: Martha Bobby MD  01/23/18 0201 Resulting lab: POINT OF CARE TEST, GLUCOSE    Specimen Information    Type Source Collected  On     01/23/18 0201          Components       Value Reference Range Flag Lab   Glucose 87 70 - 99 mg/dL  170            Glucose by meter [[id number]]  Resulted: 01/22/18 2106, Result status: Final result    Ordering provider: Martha Bobby MD  01/22/18 2100 Resulting lab: POINT OF CARE TEST, GLUCOSE    Specimen Information    Type Source Collected On     01/22/18 2100          Components       Value Reference Range Flag Lab   Glucose 91 70 - 99 mg/dL  170            Glucose by meter [[id number]]  Resulted: 01/22/18 1711, Result status: Final result    Ordering provider: Martha Bobby MD  01/22/18 1658 Resulting lab: POINT OF CARE TEST, GLUCOSE    Specimen Information    Type Source Collected On     01/22/18 1658          Components       Value Reference Range Flag Lab   Glucose 99 70 - 99 mg/dL  170            Glucose by meter [[id number]] (Abnormal)  Resulted: 01/22/18 1616, Result status: Final result    Ordering provider: Martha Bobby MD  01/22/18 1612 Resulting lab: POINT OF CARE TEST, GLUCOSE    Specimen Information    Type Source Collected On     01/22/18 1612          Components       Value Reference Range Flag Lab   Glucose 118 70 - 99 mg/dL H 170            Glucose by meter [[id number]] (Abnormal)  Resulted: 01/22/18 1306, Result status: Final result    Ordering provider: Martha Bobby MD  01/22/18 1259 Resulting lab: POINT OF CARE TEST, GLUCOSE    Specimen Information    Type Source Collected On     01/22/18 1259          Components       Value Reference Range Flag Lab   Glucose 193 70 - 99 mg/dL H 170            CBC with platelets differential [[id number]] (Abnormal)  Resulted: 01/22/18 0941, Result status: Final result    Ordering provider: Pritesh Villaseñor MD  01/22/18 0000 Resulting lab: [facility]    Specimen Information    Type Source Collected On   Blood  01/22/18 0837          Components       Value Reference Range Flag Lab   WBC 4.3 4.0 - 11.0 10e9/L  FrStHsLb    RBC Count 2.25 4.4 - 5.9 10e12/L L FrStHsLb   Hemoglobin 8.3 13.3 - 17.7 g/dL L FrStHsLb   Hematocrit 24.9 40.0 - 53.0 % L FrStHsLb    78 - 100 fl H FrStHsLb   MCH 36.9 26.5 - 33.0 pg H FrStHsLb   MCHC 33.3 31.5 - 36.5 g/dL  FrStHsLb   RDW 18.6 10.0 - 15.0 % H FrStHsLb   Platelet Count 40 150 - 450 10e9/L LL FrStHsLb   Comment:  .   Previous critical documented     Diff Method Manual Differential   FrStHsLb   % Neutrophils 93.0 %  FrStHsLb   % Lymphocytes 2.0 %  FrStHsLb   % Monocytes 4.0 %  FrStHsLb   % Eosinophils 1.0 %  FrStHsLb   % Basophils 0.0 %  FrStHsLb   Nucleated RBCs 4 0 /100 H FrStHsLb   Absolute Neutrophil 4.0 1.6 - 8.3 10e9/L  FrStHsLb   Absolute Lymphocytes 0.1 0.8 - 5.3 10e9/L L FrStHsLb   Absolute Monocytes 0.2 0.0 - 1.3 10e9/L  FrStHsLb   Absolute Eosinophils 0.0 0.0 - 0.7 10e9/L  FrStHsLb   Absolute Basophils 0.0 0.0 - 0.2 10e9/L  FrStHsLb   Absolute Nucleated RBC 0.2   FrStHsLb   Polychromasia Slight   FrStHsLb   Ovalocytes Slight   FrStHsLb   Toxic Granulation Present   FrStHsLb   Platelet Estimate Automated count confirmed.  Platelet morphology is normal.   FrStHsLb            Basic metabolic panel [[id number]] (Abnormal)  Resulted: 01/22/18 0912, Result status: Final result    Ordering provider: Kellee Melo MD  01/22/18 0000 Resulting lab: [facility]    Specimen Information    Type Source Collected On   Blood  01/22/18 0837          Components       Value Reference Range Flag Lab   Sodium 141 133 - 144 mmol/L  FrStHsLb   Potassium 4.1 3.4 - 5.3 mmol/L  FrStHsLb   Chloride 109 94 - 109 mmol/L  FrStHsLb   Carbon Dioxide 23 20 - 32 mmol/L  FrStHsLb   Anion Gap 9 3 - 14 mmol/L  FrStHsLb   Glucose 110 70 - 99 mg/dL H FrStHsLb   Urea Nitrogen 25 7 - 30 mg/dL  FrStHsLb   Creatinine 1.01 0.66 - 1.25 mg/dL  FrStHsLb   GFR Estimate 69 >60 mL/min/1.7m2  FrStHsLb   Comment:  Non  GFR Calc   GFR Estimate If Black 84 >60 mL/min/1.7m2  FrStHsLb   Comment:   African American GFR Calc   Calcium 8.9 8.5 - 10.1 mg/dL  FrStHsLb            Magnesium [[id number]]  Resulted: 01/22/18 0912, Result status: Final result    Ordering provider: Pritesh Villaseñor MD  01/22/18 0837 Resulting lab: [facility]    Specimen Information    Type Source Collected On     01/22/18 0837          Components       Value Reference Range Flag Lab   Magnesium 2.3 1.6 - 2.3 mg/dL  FrStHsLb            Glucose by meter [[id number]] (Abnormal)  Resulted: 01/22/18 0841, Result status: Final result    Ordering provider: Martha Bobby MD  01/22/18 0827 Resulting lab: POINT OF CARE TEST, GLUCOSE    Specimen Information    Type Source Collected On     01/22/18 0827          Components       Value Reference Range Flag Lab   Glucose 102 70 - 99 mg/dL H 170            Glucose by meter [[id number]] (Abnormal)  Resulted: 01/22/18 0221, Result status: Final result    Ordering provider: Martha Bobby MD  01/22/18 0215 Resulting lab: POINT OF CARE TEST, GLUCOSE    Specimen Information    Type Source Collected On     01/22/18 0215          Components       Value Reference Range Flag Lab   Glucose 123 70 - 99 mg/dL H 170            Glucose by meter [[id number]] (Abnormal)  Resulted: 01/21/18 2126, Result status: Final result    Ordering provider: Martha Bobby MD  01/21/18 2118 Resulting lab: POINT OF CARE TEST, GLUCOSE    Specimen Information    Type Source Collected On     01/21/18 2118          Components       Value Reference Range Flag Lab   Glucose 157 70 - 99 mg/dL H 170            Glucose by meter [[id number]] (Abnormal)  Resulted: 01/21/18 1636, Result status: Final result    Ordering provider: Martha Bobby MD  01/21/18 1632 Resulting lab: POINT OF CARE TEST, GLUCOSE    Specimen Information    Type Source Collected On     01/21/18 1632          Components       Value Reference Range Flag Lab   Glucose 141 70 - 99 mg/dL H 170            Lactic acid level STAT [[id number]]   Resulted: 01/21/18 1433, Result status: Final result    Ordering provider: Kellee Melo MD  01/21/18 1357 Resulting lab: [facility]    Specimen Information    Type Source Collected On   Blood  01/21/18 1420          Components       Value Reference Range Flag Lab   Lactic Acid 1.0 0.7 - 2.0 mmol/L  FrStHsLb            Glucose by meter [[id number]] (Abnormal)  Resulted: 01/21/18 1256, Result status: Final result    Ordering provider: Martha Bobby MD  01/21/18 1250 Resulting lab: POINT OF CARE TEST, GLUCOSE    Specimen Information    Type Source Collected On     01/21/18 1250          Components       Value Reference Range Flag Lab   Glucose 162 70 - 99 mg/dL H 170            CBC with platelets [[id number]] (Abnormal)  Resulted: 01/21/18 0908, Result status: Final result    Ordering provider: Martha Bobby MD  01/21/18 0000 Resulting lab: [facility]    Specimen Information    Type Source Collected On   Blood  01/21/18 0834          Components       Value Reference Range Flag Lab   WBC 3.4 4.0 - 11.0 10e9/L L FrStHsLb   RBC Count 2.28 4.4 - 5.9 10e12/L L FrStHsLb   Hemoglobin 8.5 13.3 - 17.7 g/dL L FrStHsLb   Hematocrit 25.1 40.0 - 53.0 % L FrStHsLb    78 - 100 fl H FrStHsLb   MCH 37.3 26.5 - 33.0 pg H FrStHsLb   MCHC 33.9 31.5 - 36.5 g/dL  FrStHsLb   RDW 18.4 10.0 - 15.0 % H FrStHsLb   Platelet Count 36 150 - 450 10e9/L LL FrStHsLb   Comment:         This result has been called to ALMAZ ESPOSITO by Reyna Dueñas on 01 21 2018   at 0908, and has been read back.               Basic metabolic panel [[id number]] (Abnormal)  Resulted: 01/21/18 0904, Result status: Final result    Ordering provider: Martha Bobby MD  01/21/18 0000 Resulting lab: [facility]    Specimen Information    Type Source Collected On   Blood  01/21/18 0834          Components       Value Reference Range Flag Lab   Sodium 140 133 - 144 mmol/L  FrStHsLb   Potassium 4.1 3.4 -  5.3 mmol/L  FrStHsLb   Chloride 108 94 - 109 mmol/L  FrStHsLb   Carbon Dioxide 22 20 - 32 mmol/L  FrStHsLb   Anion Gap 10 3 - 14 mmol/L  FrStHsLb   Glucose 183 70 - 99 mg/dL H FrStHsLb   Urea Nitrogen 29 7 - 30 mg/dL  FrStHsLb   Creatinine 1.02 0.66 - 1.25 mg/dL  FrStHsLb   GFR Estimate 68 >60 mL/min/1.7m2  FrStHsLb   Comment:  Non  GFR Calc   GFR Estimate If Black 83 >60 mL/min/1.7m2  FrStHsLb   Comment:  African American GFR Calc   Calcium 8.8 8.5 - 10.1 mg/dL  FrStHsLb            Phosphorus [[id number]]  Resulted: 01/21/18 0904, Result status: Final result    Ordering provider: Kellee Melo MD  01/21/18 0000 Resulting lab: [facility]    Specimen Information    Type Source Collected On   Blood  01/21/18 0834          Components       Value Reference Range Flag Lab   Phosphorus 2.9 2.5 - 4.5 mg/dL  FrStHsLb            Glucose by meter [[id number]] (Abnormal)  Resulted: 01/21/18 0716, Result status: Final result    Ordering provider: Martha Bobby MD  01/21/18 0656 Resulting lab: POINT OF CARE TEST, GLUCOSE    Specimen Information    Type Source Collected On     01/21/18 0656          Components       Value Reference Range Flag Lab   Glucose 102 70 - 99 mg/dL H 170            Glucose by meter [[id number]] (Abnormal)  Resulted: 01/21/18 0221, Result status: Final result    Ordering provider: Martha Bobby MD  01/21/18 0210 Resulting lab: POINT OF CARE TEST, GLUCOSE    Specimen Information    Type Source Collected On     01/21/18 0210          Components       Value Reference Range Flag Lab   Glucose 118 70 - 99 mg/dL H 170            Glucose by meter [[id number]] (Abnormal)  Resulted: 01/20/18 2105, Result status: Final result    Ordering provider: Martha Bobby MD  01/20/18 2102 Resulting lab: POINT OF CARE TEST, GLUCOSE    Specimen Information    Type Source Collected On     01/20/18 2102          Components       Value Reference Range Flag Lab   Glucose 161 70 -  99 mg/dL H 170            Glucose by meter [[id number]] (Abnormal)  Resulted: 01/20/18 1711, Result status: Final result    Ordering provider: Martha Bobby MD  01/20/18 1704 Resulting lab: POINT OF CARE TEST, GLUCOSE    Specimen Information    Type Source Collected On     01/20/18 1704          Components       Value Reference Range Flag Lab   Glucose 126 70 - 99 mg/dL H 170            Troponin I [[id number]]  Resulted: 01/20/18 1513, Result status: Final result    Ordering provider: Martha Bobby MD  01/20/18 0800 Resulting lab: [facility]    Specimen Information    Type Source Collected On   Blood  01/20/18 1443          Components       Value Reference Range Flag Lab   Troponin I ES 0.016 0.000 - 0.045 ug/L  FrStHsLb   Comment:         The 99th percentile for upper reference range is 0.045 ug/L.  Troponin values   in the range of 0.045 - 0.120 ug/L may be associated with risks of adverse   clinical events.              Glucose by meter [[id number]] (Abnormal)  Resulted: 01/20/18 1236, Result status: Final result    Ordering provider: Martha Bobby MD  01/20/18 1225 Resulting lab: POINT OF CARE TEST, GLUCOSE    Specimen Information    Type Source Collected On     01/20/18 1225          Components       Value Reference Range Flag Lab   Glucose 100 70 - 99 mg/dL H 170            Troponin I [[id number]]  Resulted: 01/20/18 1123, Result status: Final result    Ordering provider: Martha Bobby MD  01/20/18 0602 Resulting lab: [facility]    Specimen Information    Type Source Collected On   Blood  01/20/18 1045          Components       Value Reference Range Flag Lab   Troponin I ES 0.020 0.000 - 0.045 ug/L  FrStHsLb   Comment:         The 99th percentile for upper reference range is 0.045 ug/L.  Troponin values   in the range of 0.045 - 0.120 ug/L may be associated with risks of adverse   clinical events.              Glucose by meter [[id number]]  Resulted:  "01/20/18 0731, Result status: Final result    Ordering provider: Martha Bobby MD  01/20/18 0726 Resulting lab: POINT OF CARE TEST, GLUCOSE    Specimen Information    Type Source Collected On     01/20/18 0726          Components       Value Reference Range Flag Lab   Glucose 85 70 - 99 mg/dL  170            Glucose by meter [[id number]]  Resulted: 01/20/18 0446, Result status: Final result    Ordering provider: Parminder Pierce MD  01/20/18 0434 Resulting lab: POINT OF CARE TEST, GLUCOSE    Specimen Information    Type Source Collected On     01/20/18 0434          Components       Value Reference Range Flag Lab   Glucose 76 70 - 99 mg/dL  170            Testing Performed By     Lab - Abbreviation Name Director Address Valid Date Range    14 - FrStHsLb [facility] Unknown [address] [place] [address] 05/08/15 1057 - Present    75 - Unknown [facility] Unknown [address] [facility] [address] 01/15/15 1019 - Present    170 - Unknown POINT OF CARE TEST, GLUCOSE Unknown Unknown 10/31/11 1114 - Present    225 - Unknown [facility] Unknown [address] [facility] [address] 12/19/14 0954 - Present            Unresulted Labs (24h ago through future)    Start       Ordered    Unscheduled  Magnesium  (Magnesium Replacement -  Adult - \"Standard\" - Replacement for all levels less than 1.6 mg/dL )  CONDITIONAL (SPECIFY),   Routine     Comments:  Obtain Magnesium Level for these conditions:  *IF no magnesium result within 24 hrs before initiation of order set, draw magnesium level with next lab collect.    *2 HOURS AFTER last magnesium replacement dose when magnesium replacement given for level less than 1.6   *Next morning after magnesium dose.     Repeat Magnesium Replacement if necessary.    01/20/18 0602         Imaging Results - 3 Days      XR Chest 2 Views [[id number]]  Resulted: 01/20/18 0644, Result status: Final result    " Ordering provider: Parminder Pierce MD  01/20/18 0218 Resulted by: Georgina Roman MD    Performed: 01/20/18 0248 - 01/20/18 0300 Resulting lab: RADIOLOGY RESULTS    Narrative:       XR CHEST 2 VW  1/20/2018 3:00 AM      HISTORY: Fall, right chest tenderness.      COMPARISON: 1/21/2017.    FINDINGS: The heart is at the upper limits of normal in size without  pulmonary edema. The thoracic aorta is calcified and mildly tortuous.  The lungs are clear. No pneumothorax or pleural effusion. 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      Impression:       IMPRESSION: No acute abnormality.    GEORGINA ROMAN MD      Testing Performed By     Lab - Abbreviation Name Director Address Valid Date Range    104 - Rad Rslts RADIOLOGY RESULTS Unknown Unknown 02/16/05 1553 - Present            Encounter-Level Documents:     There are no encounter-level documents.      Order-Level Documents:     There are no order-level documents.

## 2018-01-20 NOTE — PHARMACY-ADMISSION MEDICATION HISTORY
Admission medication history interview status for the 1/20/2018  admission is complete. See EPIC admission navigator for prior to admission medications     Medication history source reliability:Good    Actions taken by pharmacist (provider contacted, etc):Interviewed pt and reviewed medications in EPIC     Additional medication history information not noted on PTA med list :None    Medication reconciliation/reorder completed by provider prior to medication history? Yes    Time spent in this activity: 30 minutes    Prior to Admission medications    Medication Sig Last Dose Taking? Auth Provider   LISINOPRIL PO Take 15 mg by mouth daily 1/2 of 30 mg  Yes Unknown, Entered By History   amLODIPine (NORVASC) 5 MG tablet TAKE 1 TABLET BY MOUTH DAILY  Yes Dickson Reich MD   furosemide (LASIX) 20 MG tablet Take 1 tablet (20 mg) by mouth daily  Yes Dickson Reich MD   LORazepam (ATIVAN) 0.5 MG tablet Take 1 tablet (0.5 mg) by mouth every 4 hours as needed (Anxiety, Nausea/Vomiting or Sleep)  Yes Gretel Quinn MD   prochlorperazine (COMPAZINE) 10 MG tablet Take 1 tablet (10 mg) by mouth every 6 hours as needed (Nausea/Vomiting)  Yes Gretel Quinn MD   dexamethasone (DECADRON) 4 MG tablet Take 5 tablets (20 mg) by mouth with food. Days 1, 2, 8, 9, 15, 16, 22, and 23. On carfilzomib days, take prior to carfilzomib dose. 1/18/2018 Yes Gretel Quinn MD   insulin aspart (NOVOLOG PEN) 100 UNIT/ML injection Inject 13 units before breakfast, 11 units before lunch, 9 units before dinner, and 4 units at bedtime.  Before meal correction:  140-175 add 1 unit   176-210 add 2 units   211-245 add 3 units   246-280 add 4 units   281-315 add 5 units  316-350 add 6 units   351-385 add 7 units  386-420 add 8 units   Over 420 add 9 units      Yes Dickson Reich MD   insulin detemir (LEVEMIR FLEXPEN/FLEXTOUCH) 100 UNIT/ML injection Inject 16 Units Subcutaneous every morning (before breakfast) On Wednesday and Thursday  morning, inject 18 units.  Yes Dickson Reich MD   glipiZIDE (GLUCOTROL) 10 MG tablet Take 1 tablet (10 mg) by mouth daily (with breakfast)  Yes Dickson Reich MD   gemfibrozil (LOPID) 600 MG tablet TAKE 1 TABLET BY MOUTH TWICE DAILY  Yes Dickson Reich MD   levothyroxine (SYNTHROID/LEVOTHROID) 25 MCG tablet TAKE 1 TABLET BY MOUTH EVERY DAY  Yes Dickson Reich MD   tamsulosin (FLOMAX) 0.4 MG capsule TAKE 1 CAPSULE BY MOUTH DAILY  Yes Dickson Reich MD   metoprolol (LOPRESSOR) 25 MG tablet TAKE 1 TABLET BY MOUTH TWICE DAILY  Yes Dickson Reich MD   omeprazole (PRILOSEC) 20 MG CR capsule TAKE 1 CAPSULE BY MOUTH EVERY DAY  Yes Dickson Reich MD   acyclovir (ZOVIRAX) 400 MG tablet Take 1 tablet (400 mg) by mouth 2 times daily Viral Prophylaxis.  Yes Gretel Quinn MD   ondansetron (ZOFRAN) 8 MG tablet Take 1 tablet (8 mg) by mouth every 8 hours as needed (Nausea/Vomiting)  Yes Rosalva Heath MD   Ascorbic Acid (VITAMIN C PO) Take 1,000 mg by mouth daily  Yes Unknown, Entered By History   Cholecalciferol (VITAMIN D3 PO) Take 1,000 Units by mouth daily  Yes Unknown, Entered By History   ferrous sulfate (IRON) 325 (65 FE) MG tablet Take 325 mg by mouth daily (with breakfast)  Yes Unknown, Entered By History   Selenium 200 MCG TABS Take 100 mcg by mouth daily. Pt takes one half tab of the 200 mcg daily   Yes Reported, Patient   B-D U/F 31G X 8 MM insulin pen needle USE 5 PEN NEEDLES PER DAY OR AS DIRECTED   Dickson Reich MD   FREESTYLE LITE test strip USE 1 STRIP TO TEST TWICE DAILY.   Dickson Reich MD   B-D U/F 31G X 8 MM insulin pen needle    Reported, Patient   polyethylene glycol (MIRALAX/GLYCOLAX) powder    Reported, Patient

## 2018-01-20 NOTE — IP AVS SNAPSHOT
` ` Patient Information     Patient Name Sex     Roc Medellin ([id number]) Male 1926       Room Bed    [facility] 66-      Patient Demographics     Address Phone    [address]   [facility] [address] [phone] (Home)  [phone] (Mobile)      Patient Ethnicity & Race     Ethnic Group Patient Race    American White      Emergency Contact(s)     Name Relation Home Work Mobile    Elfego Medellin Brother [phone] NONE NONE    MARCELL MEDELLIN NONE NONE [phone]      Documents on File        Status Date Received Description       Documents for the Patient    Privacy Notice - [place] Received 08     Face Sheet Received () 09     Face Sheet Received () 07/05/10     External Medication Information Consent Accepted () 07/05/10     Patient ID Received 18     Consent for Services - Hospital/Clinic Received () 11     HIM LISA Authorization   release from patient 3/8/11    External Medication Information Consent Accepted () 12     Consent for Services - Hospital/Clinic Received () 12     Consent to Communicate Received () 12     Consent for EHR Access  13 Copied from existing Consent for services - C/HOD collected on 2012    [facility] Specified Other       Consent for Services - Hospital/Clinic Received () 13     Consent for EHR Access Received 13     External Medication Information Consent Accepted 13     Consent to Communicate Received 13     Insurance Card Received 18 [employer]    Insurance Card Received 18 Medicare    Consent for Services - Hospital/Clinic Received () 14     Consent for Services - Hospital/Clinic Received () 07/10/15     Consent for Services - Hospital/Clinic Received () 07/10/15     Consent for Services/Privacy Notice - Hospital/Clinic Received () 16     Advance Directives and Living Will  Received 12/13/16 POLST 11/29/2016    Consent for Services/Privacy Notice - Hospital/Clinic Received 02/06/17     Business/Insurance/Care Coordination/Health Form - Patient  05/02/17 MEDICA-CYCLOPHOSPHAMIDE CAPSULE DENIED-04/19/2017    Physical Therapy Certification Received 05/31/17 through 8/22/17    Business/Insurance/Care Coordination/Health Form - Patient  08/22/17 [place] DEPARTMENT OF PUBLIC SAFETY APPLICATION FOR DISABILITY PARKING CERTIFICATE-08/07/17    Business/Insurance/Care Coordination/Health Form - Patient  08/23/17 [facility], MEDICATION LIST, 08-11-17    Care Everywhere Prospective Auth Received 10/18/17     Insurance Card  (Deleted) 07/19/08     Insurance Card  (Deleted) 07/05/10     Insurance Card Received (Deleted) 02/22/11     Insurance Card Received (Deleted) 10/11/12     Insurance Card Received (Deleted) 10/11/12     Advance Directives and Living Will Not Received (Deleted)  Invalid - to be deleted       Documents for the Encounter    CMS IM for Patient Signature Received 01/22/18       Admission Information     Attending Provider Admitting Provider Admission Type Admission Date/Time    Martha Bobby MD Nalawadi, Shantesh, MD Emergency 01/20/18  0120    Discharge Date Hospital Service Auth/Cert Status Service Area     Hospitalist [facility] SERVICES    Unit Room/Bed Admission Status       [address] UNIT 6617/6617-01 Admission (Confirmed)       Admission     Complaint    UTI (urinary tract infection)      Hospital Account     Name Acct ID Class Status Primary Coverage    Roc Parkinson [id number] Inpatient Open MEDICARE - MEDICARE FOR HB SUPPLEMENT            Guarantor Account (for Hospital Account #[id number])     Name Relation to Pt Service Area Active? Acct Type    Roc Parkinson  FCS Yes Personal/Family    Address Phone          [address] [place] [address] [phone](H)              Coverage Information (for  Hospital Account #[id number])     1. MEDICARE/MEDICARE FOR HB SUPPLEMENT     F/O Payor/Plan Precert #    MEDICARE/MEDICARE FOR HB SUPPLEMENT     Subscriber Subscriber #    Roc Parkinson [id number]    Address Phone    ATTN CLAIMS  [address]  [place] [address] [phone]          2. MEDICA/MEDICA PRIME SOLUTION     F/O Payor/Plan Precert #    MEDICA/MEDICA PRIME SOLUTION     Subscriber Subscriber #    Roc Parkinson [id number]    Address Phone    [address] [address]  [place], [place] [id number] [phone]

## 2018-01-20 NOTE — ED NOTES
Pt brought to ED with family with reports of HTN . Pt states that his systolic BP was 199 tonight pta. Pt reports HTN and chest pressure/pain that began about a week ago. Pt also reports dizziness and weakness. Pt states that he recently has had some falls.  Pt currently being treated with chemotherapy for multiple myeloma. Respirations even and unlabored. Call light in reach, will continue to monitor.

## 2018-01-20 NOTE — PLAN OF CARE
Problem: Patient Care Overview  Goal: Plan of Care/Patient Progress Review  Outcome: No Change  Pt arrive at around 0600. BP elevated upon revival, reduced to 165/85 with no medication intervention. Other VSS on RA. LS clear. Voiding adequately with urinal. Uses walker at baseline. Has only stood at bedside with assist x2. Trace edema in ankles/feet. Tingling present in R hand, per pt baseline. Tele: NSR. Continue to monitor.

## 2018-01-20 NOTE — IP AVS SNAPSHOT
"` [place] [address] [facility] SPECIAL[facility] UNIT: [phone]                                              INTERAGENCY TRANSFER FORM - NURSING   2018                    Hospital Admission Date: 2018  JACOB MEDELLIN   : 1926  Sex: Male        Attending Provider: Martha Bobby MD     Allergies:  No Known Allergies    Infection:  ESBL   Service:  HOSPITALIST    Ht:  1.753 m (5' 9\")   Wt:  91.5 kg (201 lb 11.5 oz)   Admission Wt:  90.7 kg (200 lb)    BMI:  29.79 kg/m 2   BSA:  2.11 m 2            Patient PCP Information     Provider PCP Type    Dickson Reich MD General      Current Code Status     Date Active Code Status Order ID Comments User Context       2018  6:02 AM DNR/DNI [id number]  Martha Bobby MD Inpatient       Code Status History     Date Active Date Inactive Code Status Order ID Comments User Context    2016  8:57 AM 2018  6:02 AM DNI [id number]  Alexandra Gay PA-C Outpatient    2016  3:47 PM 2016  8:57 AM DNI [id number]  Alexandra Gay PA-C ED    2016 10:20 AM 2016  3:47 PM Full Code [id number]  Mary Peters MD Outpatient    2016  5:58 PM 2016 10:20 AM Full Code [id number]  Alexys Fontenot MD Inpatient    8/15/2014 12:03 PM 2016  5:58 PM Full Code [id number]  Estela Mcelroy MD Outpatient    2014  1:18 PM 8/15/2014 12:03 PM Full Code [id number]  Estela Mcelroy MD Inpatient    2012  7:23 AM 2012  2:00 PM Full Code [id number]  Neeta Rincon RN Inpatient    2012 11:23 AM 2012  7:23 AM Full Code [id number]  Alexandrea Jackson RN Inpatient      Advance Directives        Does patient have a scanned Advance Directive/ACP document in EPIC?           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/22/2012    Oral lesion Medium  12/23/2012    CTS (carpal tunnel syndrome) Medium  1/28/2013    Type 2 diabetes mellitus with stage 3 chronic kidney disease, with long-term current use of insulin (H) Medium  7/29/2013    Irritable bowel syndrome Medium  10/9/2013    Vitamin D deficiency disease Medium  10/9/2013    Microalbuminuria Medium  11/12/2013    Obesity Medium  8/12/2014    Iron deficiency anemia Medium  8/12/2014    Nonsmoker Medium  8/12/2014    A-fib (H) Medium  8/14/2014    Health Care Home Medium  8/19/2014    Hyperlipidemia Medium  Unknown    CKD (chronic kidney disease) stage 3, GFR 30-59 ml/min Medium  10/14/2015    Hypothyroidism Medium  2/29/2016    Long-term (current) use of anticoagulants [Z79.01] Medium  3/21/2016    Macrocytic anemia Medium  10/1/2016    GIB (gastrointestinal bleeding) Medium  11/13/2016    Multiple myeloma not having achieved remission (H) Medium  11/23/2016    Hypercalcemia Medium  12/15/2016    Hyperglycemia Medium  12/17/2016    Urinary tract infection Medium  2/14/2017    Hyperkalemia Medium  2/22/2017    ACP (advance care planning) Medium  2/28/2017    MDS (myelodysplastic syndrome) (H) Medium  5/17/2017    Chemotherapy-induced neutropenia (H) Medium  6/27/2017    Anemia in neoplastic disease Medium  6/27/2017      Immunizations     Name Date      Influenza (High Dose) 3 valent vaccine 10/04/17     Influenza (High Dose) 3 valent vaccine 10/01/16     Influenza (High Dose) 3 valent vaccine 10/01/15     Influenza (IIV3) PF 09/25/14     Influenza (IIV3) PF 10/01/13     Pneumo Conj 13-V (2010&after) 10/01/15     Pneumococcal 23 valent 01/01/10     TD (ADULT, 7+)  "08/11/09          END      ASSESSMENT     Discharge Profile Flowsheet     EXPECTED DISCHARGE     Resources List  Home Care 01/19/17 1051    Expected Discharge Date  01/23/18 (Home) 01/22/18 1509   Existing Resources/Services  None 08/19/14 1516    DISCHARGE NEEDS ASSESSMENT     SKIN      Concerns To Be Addressed  all concerns addressed in this encounter 01/19/17 1051   Inspection of bony prominences  Full 01/23/18 1005    Equipment Currently Used at Home  walker, rolling (tried tub bench but too big, walkin in shower) 01/22/18 1058   Skin WDL  ex 01/23/18 0837    # of Referrals Placed by CTS  Senior Linkage Line;Post Acute Facilities 01/23/18 1428   Inspection under devices  Full 01/23/18 0255    Equipment Used at Home  rolling walker 11/18/12 1444   Skin Color/Characteristics  pale (redness to buttock fold; blanchable) 01/23/18 1005    GASTROINTESTINAL (ADULT,PEDIATRIC,OB)     Skin Temperature  warm 01/23/18 0255    GI WDL  WDL 01/23/18 0837   Skin Moisture  dry 01/23/18 0255    Abdominal Appearance  obese 01/23/18 0255   Skin Elasticity  quick return to original state 01/23/18 0255    Last Bowel Movement  01/21/18 01/23/18 0830   Skin Integrity  bruise(s) 01/23/18 1005    Passing flatus  yes 01/23/18 0255   SAFETY      COMMUNICATION ASSESSMENT     Safety WDL  WDL 01/23/18 1005    Patient's communication style  spoken language (English or Bilingual) 01/20/18 0610   All Alarms  alarm(s) activated and audible 01/23/18 1005    FINAL RESOURCES                        Assessment WDL (Within Defined Limits) Definitions           Safety WDL     Effective: 09/28/15    Row Information: <b>WDL Definition:</b> Bed in low position, wheels locked; call light in reach; upper side rails up x 2; ID band on<br> <font color=\"gray\"><i>Item=AS safety wdl>>List=AS safety wdl>>Version=F14</i></font>      Skin WDL     Effective: 09/28/15    Row Information: <b>WDL Definition:</b> Warm; dry; intact; elastic; without discoloration; pressure " "points without redness<br> <font color=\"gray\"><i>Item=AS skin wdl>>List=AS skin wdl>>Version=F14</i></font>      Vitals     Vital Signs Flowsheet     VITAL SIGNS     BSA (Calculated - sq m)  2.1 01/20/18 0119    Temp  98.2  F (36.8  C) 01/23/18 0759   BMI (Calculated)  29.6 01/20/18 0119    Temp src  Oral 01/23/18 0209   [place] COMA SCALE      Resp  16 01/23/18 0759   Best Eye Response  4-->(E4) spontaneous 01/23/18 0255    Pulse  78 01/23/18 0759   Best Motor Response  6-->(M6) obeys commands 01/23/18 0255    Heart Rate  74 01/23/18 0209   Best Verbal Response  5-->(V5) oriented 01/23/18 0255    Pulse/Heart Rate Source  Monitor 01/23/18 0209   [place] Coma Scale Score  15 01/23/18 0255    BP  159/77 01/23/18 0759   POSITIONING      BP Location  Right arm 01/23/18 0759   Body Position  independently positioning;supine, head elevated 01/23/18 1334    OXYGEN THERAPY     Head of Bed (HOB)  HOB at 20 degrees 01/23/18 1001    SpO2  93 % 01/23/18 0800   Chair  Upright in chair 01/22/18 0857    O2 Device  None (Room air) 01/23/18 0800   Positioning/Transfer Devices  pillows;in use 01/22/18 2131    PAIN/COMFORT     DAILY CARE      Patient Currently in Pain  denies 01/23/18 1001   Activity Management  ambulated to bathroom 01/23/18 1334    HEIGHT AND WEIGHT     Activity Assistance Provided  assistance, stand-by 01/23/18 1334    Height  1.753 m (5' 9\") 01/20/18 0119   Assistive Device Utilized  gait belt;walker 01/23/18 1113    Weight  91.5 kg (201 lb 11.5 oz) 01/23/18 0447   ECG      Weight Method  Bed scale 01/23/18 0349   Equipment  electrodes changed 01/21/18 1456            Patient Lines/Drains/Airways Status    Active LINES/DRAINS/AIRWAYS     Name: Placement date: Placement time: Site: Days: Last dressing change:    Incision/Surgical Site 11/05/12 Anterior;Right Knee 11/05/12   0988 6826     Incision/Surgical Site 11/17/16 Posterior Back 11/17/16   1100    432             Patient Lines/Drains/Airways Status  "   Active PICC/CVC     Name: Placement date: Placement time: Site: Days: Additional Info Last dressing change:    Midline Catheter Single Lumen 01/23/18   1158    less than 1 External Cath Length (cm): 0 cm            Size (Fr): 4 Fr            Orientation: Left            Extremity Circumference (cm): 35.5 cm            Vein : Basilic            Midline Comment: ok for immediate use of extended dwell catheter            Catheter Brand: powerwand extended dwell            Dressing Intervention: Chlorhexidine patch;Transparent;Securing device            Site Prep: Chlorhexidine            Local Anesthetic: Injectable            Inserted by: BEN Lizarraga RN            Insertion attempts with ultrasound: 1            Patient Tolerance: Tolerated well            Line Flushed (See MAR): Yes            Total Catheter Length Trimmed (cm): 6 cm            Difficulty with threading line: No            Description: Non - valved (open ended)            Tip location: Other (see comment)            Full barrier precautions done: Yes            Consent Signed: No            Time Out performed: Yes            Lot #: [id number]               Intake/Output Detail Report     Date Intake     Output Net    Shift P.O. I.V. IV Piggyback Total Urine Total       Day 01/22/18 0700 - 01/22/18 1459 -- -- -- -- -- -- 0    Lory 01/22/18 1500 - 01/22/18 2259 360 20 -- 380 -- -- 380    Noc 01/22/18 2300 - 01/23/18 0659 -- -- -- -- -- -- 0    Day 01/23/18 0700 - 01/23/18 1459 240 -- -- 240 0 -- 240    Lory 01/23/18 1500 - 01/23/18 2259 -- -- -- -- -- -- 0      Last Void/BM       Most Recent Value    Urine Occurrence 1 at 01/23/2018 1100    Stool Occurrence 1 at 01/21/2018 1344      Case Management/Discharge Planning     Case Management/Discharge Planning Flowsheet     REFERRAL INFORMATION     DISCHARGE PLANNING      Arrived From  home or self-care 12/18/16 [address] [place] [address] 11/20/12 0843    # of Referrals Placed by [facility]  Senior  Linkage Line;Post Acute Facilities 01/23/18 1428   Skilled Nursing Facility Phone Number  [phone] 11/20/12 0843    Reason For Consult  discharge planning 01/23/18 1428   Equipment Used at Home  rolling walker 11/18/12 1444    CTS Assigned to Case  -- (carlos olivia) 01/23/18 1428   FINAL RESOURCES      Primary Care MD Name  Dr. Dickson Reich 11/20/16 1121   Equipment Currently Used at Home  walker, rolling (tried tub bench but too big, walkin in shower) 01/22/18 1058    LIVING ENVIRONMENT     Resources List  Home Care 01/19/17 1051    Lives With  alone 01/22/18 1058   Existing Resources/Services  None 08/19/14 1516    Living Arrangements  independent living facility 01/22/18 1058   ABUSE RISK SCREEN      Provides Primary Care For  no one 01/20/18 0610   QUESTION TO PATIENT:  Has a member of your family or a partner(now or in the past) intimidated, hurt, manipulated, or controlled you in any way?  no 01/20/18 1804    COPING/STRESS     QUESTION TO PATIENT: Do you feel safe going back to the place where you are living?  yes 01/20/18 1804    Major Change/Loss/Stressor  none 01/20/18 0610   OBSERVATION: Is there reason to believe there has been maltreatment of a vulnerable adult (ie. Physical/Sexual/Emotional abuse, self neglect, lack of adequate food, shelter, medical care, or financial exploitation)?  no 01/20/18 1804    Patient Personal Strengths  expressive of needs;motivated;positive attitude;strong support system 11/18/12 1421   (R) MENTAL HEALTH SUICIDE RISK      EXPECTED DISCHARGE     Are you depressed or being treated for depression?  No 01/20/18 0610    Expected Discharge Date  01/23/18 (Home) 01/22/18 1509   HOMICIDE RISK      ASSESSMENT/CONCERNS TO BE ADDRESSED     Feels Like Hurting Others  no 01/20/18 0119    Concerns To Be Addressed  all concerns addressed in this encounter 01/19/17 1051

## 2018-01-20 NOTE — ED PROVIDER NOTES
History     Chief Complaint:  Hypertension    HPI   Roc Parkinson is a 89 year old male who presents with hypertension. The patient states that he has had 2 recent falls where he fell onto his buttocks. Since 1/18 the patient noticed his blood pressures have been high when at his chemotherapy appointments. Over this time, he has also had some increasing dizziness that has been constant as well as some slight chest discomfort. He states that his chest discomfort is present, mostly at night, and that his dizziness is present when standing up. All of these symptoms worsened early this morning with blood pressure reportedly at 199/100. He does not recall any trauma to his head or chest to cause these symptoms. He denies any significant dyspnea, headaches, abdominal pains, coughs, fevers, head trauma, urinary symptoms, bowel complications, or any other symptoms. Of note, his last chemotherapy appointment was on 1/18.    Allergies:  No known drug allergies.    Medications:    lisinopril (PRINIVIL,ZESTRIL) 30 MG tablet  amLODIPine (NORVASC) 5 MG tablet  furosemide (LASIX) 20 MG tablet  LORazepam (ATIVAN) 0.5 MG tablet  prochlorperazine (COMPAZINE) 10 MG tablet  dexamethasone (DECADRON) 4 MG tablet  glipiZIDE (GLUCOTROL) 10 MG tablet  gemfibrozil (LOPID) 600 MG tablet  levothyroxine (SYNTHROID/LEVOTHROID) 25 MCG tablet  tamsulosin (FLOMAX) 0.4 MG capsule  metoprolol (LOPRESSOR) 25 MG tablet  omeprazole (PRILOSEC) 20 MG CR capsule  polyethylene glycol (MIRALAX/GLYCOLAX) powder  acyclovir (ZOVIRAX) 400 MG tablet  ondansetron (ZOFRAN) 8 MG tablet  Ascorbic Acid (VITAMIN C PO)  Cholecalciferol (VITAMIN D3 PO)  ferrous sulfate (IRON) 325 (65 FE) MG tablet  Selenium 200 MCG TABS    Past Medical History:    Arthritis  Blood transfusion  Diabetes mellitus  Heart disease  Hyperlipidemia  Hypertension  Hypothyroidism  Malignant neoplasm    Past Surgical History:    Knee arthroplasty  Bladder biopsy  TURP x2  GI surgery and  "fistula  Left knee surgery  Phacoemulsification clear cornea with standard intraocular lens implant  Melanoma removal    Family History:    Father-heart arrhythmia   Brother-Paget's disease    Social History:  Smoking status: Never smoker  Alcohol use: No  Marital Status:         Review of Systems   Constitutional: Negative for chills, fatigue and fever.   Eyes: Positive for pain. Negative for photophobia and visual disturbance.   Respiratory: Positive for chest tightness. Negative for cough.    Cardiovascular: Positive for chest pain. Negative for palpitations and leg swelling.   Gastrointestinal: Negative for abdominal pain, diarrhea, nausea and vomiting.   Genitourinary: Positive for decreased urine volume. Negative for difficulty urinating, dysuria, hematuria and urgency.   Musculoskeletal: Negative for back pain.   Skin: Negative for color change and wound.   Allergic/Immunologic: Positive for immunocompromised state.   Neurological: Positive for dizziness. Negative for syncope, weakness, light-headedness and headaches.   All other systems reviewed and are negative.    Physical Exam     Patient Vitals for the past 24 hrs:   BP Temp Temp src Pulse Heart Rate Resp SpO2 Height Weight   01/20/18 0557 (!) 184/104 97.9  F (36.6  C) Oral - 71 15 92 % - 93.8 kg (206 lb 14.4 oz)   01/20/18 0503 - - - - 72 13 94 % - -   01/20/18 0330 - - - - 66 10 97 % - -   01/20/18 0315 (!) 191/94 - - - 73 12 96 % - -   01/20/18 0300 182/89 - - - - - 95 % - -   01/20/18 0230 - - - - 70 8 95 % - -   01/20/18 0227 175/89 - - - 68 17 95 % - -   01/20/18 0206 - - - - 70 11 94 % - -   01/20/18 0136 - - - - 74 13 95 % - -   01/20/18 0129 176/89 - - - 125 21 94 % - -   01/20/18 0118 161/85 97.8  F (36.6  C) Oral 79 79 20 96 % 1.753 m (5' 9\") 90.7 kg (200 lb)       Physical Exam  General: Well appearing, nontoxic. Resting comfortably  Head:  Scalp, face, and head appear normal, atraumatic  Eyes:  Pupils are equal, round, and reactive to " light    Conjunctivae non-injected and sclerae white  ENT:    The external nose is normal    Pinnae are normal    The oropharynx is normal, mucous membranes moist    Uvula is in the midline  Neck:  Normal range of motion. Cervical spine nontender, no stepoffs    There is no rigidity noted    Trachea is in the midline  CV:  Regular rate and rhythm     Normal S1/S2, no S3/S4    No murmur or rub  Resp:  Lungs are clear and equal bilaterally    There is no tachypnea    No increased work of breathing    No rales, wheezing, or rhonchi  GI:  Abdomen is soft, no rigidity or guarding    No distension, or mass    No tenderness or rebound tenderness   MS:  Normal muscular tone. Mild TTP over right lateral chest wall, no deformity or crepitus.    No deformity or bony tenderness to the extremities. Full ROM.    Symmetric motor strength    No lower extremity edema  Skin:  No rash or acute skin lesions noted  Neuro: A&Ox3, GCS 15    CN II - XII intact    Speech clear, fluent, and normal    Strength 5/5 and symmetric in bilateral upper and lower extremities.    No pronator drift. SILT throughout    no ankle clonus    FTN testing normal. No tremor.   Psych: Normal affect.  Appropriate interactions.      Emergency Department Course   ECG:  @ 0202  Indication: hypertension   Vent. Rate 67 bpm. DC interval 178 ms. QRS duration 134 ms. QT/QTc 416/439 ms. P-R-T axis 25 17 12.   Normal sinus rhythm. Right bundle branch block. Abnormal ECG.   Read @ 0204 by Dr. Pierce.    Imaging:  XR Chest 2 views  IMPRESSION: No acute abnormality.  Report per radiology.     Laboratory:  CBC:  WBC 2.3 (L), HGB 8.1 (L), PLT 70 (L),  CMP: BUN 45 (H), Creatinine 1.45 (H), GFR 46 (L), Albumin 2.6 (L), otherwise WNL   INR: 1.09  PTT: 25  (0220) Troponin I: <0.015  (0220) Magnesium: 2.2  (0220) Phosphorous: 2.2 (L)  (0434) Glucose by meter: 76   Blood culture #1: pending  Blood culture #2: pending    UA: Clear Light yellow urine, Albumin 10, Nitrite positive,  Leukocyte esterase Large, WBC 44 (H), Bacteria few, Mucous present, otherwise WNL  Urine Culture: pending    Interventions:  (0219) Lactated ringers BOLUS 1,000 mL, IV  (0412) Meropenem, intermittent 1 g, IV influsion    Emergency Department Course:  Nursing notes and vitals reviewed.  (0207) I performed an exam of the patient as documented above.    EKG was done, interpretation as above.  Blood was drawn from the patient. This was sent for laboratory testing, findings above.   The patient was sent for a x-ray while in the emergency department, findings above.     Findings and plan explained to the patient who consents to admission.   (0427) I discussed the patient with Dr. Bobby of the hospitalist service, who will admit the patient to a in patient bed for further monitoring, evaluation, and treatment.    Impression & Plan    Medical Decision Making:  Roc Parkinson is a 89 year old male with a history of multiple myeloma, actively receiving chemotherapy, who presents for subacute dizziness, intermittent episodes of palpitations, and generalized malaise as noted above.  He was chiefly concerned that he noted his blood pressure was elevated at home tonight which is unusual for him he had increased dizziness as well which prompted his arrival in the ED.  Differential diagnosis includes dysrhythmia, acute coronary syndrome, pulmonary embolism, infectious etiologies including pneumonia, urinary tract infection, as well as electrolyte disturbances, hypoglycemia was also considered.  Broad workup was initiated and was remarkable for pancytopenia although his absolute neutrophil count was 1600, BUN and creatinine are elevated above baseline, consistent with acute renal insufficiency.  Electrolytes otherwise normal.  Initial troponin was negative, blood glucose was 75, urinalysis was positive for pyuria and nitrates consistent with urinary tract infection, upon review of his urinary culture data he has had multiple  previous UTI's with ESBL resistant bacteria.  Therefore he will be covered with meropenem.  Cultures were drawn and he has no evidence of severe sepsis or septic shock at this time.  In addition, patient was noted to have multiple episodes of paroxysmal atrial tachycardia with a narrow complex that was associated with dizziness and palpitations.  EKG was obtained during a period of normal sinus rhythm with showed no acute ischemia.  Due to the brief nature of these episodes of tachycardia, I was unable to capture one on EKG.  However, it did appear to be regular.  Differential diagnosis for this would be atrial flutter, atrial fibrillation, sinus tachycardia, or other supraventricular tachycardia.  Patient did not have any hemodynamic instability with this.  Given the above-noted problems, patient will be admitted to the hospital for evaluation and treatment of his symptoms.  Patient remained vitally stable during his emergency department course.    Diagnosis:    ICD-10-CM   1. Acute cystitis without hematuria N30.00   2. Atrial tachycardia, paroxysmal (H) I47.1   3. Dizziness R42       Disposition:  Patient is admitted to a inpatient bed under the care of Dr. Bobby.            IKrishna, am serving as a scribe on 1/20/2018 at 2:07 AM to personally document services performed by Dr. Pierce based on my observations and the provider's statements to me.         Krishna Bartlett  1/20/2018    EMERGENCY DEPARTMENT       Parminder Pierce MD  01/20/18 0928

## 2018-01-20 NOTE — IP AVS SNAPSHOT
"[place] [address] [facility]: [phone]                                              INTERAGENCY TRANSFER FORM - PHYSICIAN ORDERS   2018                    Hospital Admission Date: 2018  JACOB MEDELLIN   : 1926  Sex: Male        Attending Provider: Martha Bobby MD     Allergies:  No Known Allergies    Infection:  ESBL   Service:  HOSPITALIST    Ht:  1.753 m (5' 9\")   Wt:  91.5 kg (201 lb 11.5 oz)   Admission Wt:  90.7 kg (200 lb)    BMI:  29.79 kg/m 2   BSA:  2.11 m 2            Patient PCP Information     Provider PCP Type    Dickson Reich MD General      ED Clinical Impression     Diagnosis Description Comment Added By Time Added    Acute cystitis without hematuria [N30.00] Acute cystitis without hematuria [N30.00]  Parminder Pierce MD 2018  4:12 AM    Atrial tachycardia, paroxysmal (H) [I47.1] Atrial tachycardia, paroxysmal (H) [I47.1]  Parminder Pierce MD 2018  4:13 AM    Dizziness [R42] Dizziness [R42]  Parminder Pierce MD 2018  4:13 AM    Antineoplastic chemotherapy induced pancytopenia (H) [D61.810, T45.1X5A] Antineoplastic chemotherapy induced pancytopenia (H) [D61.810, T45.1X5A]  Parminder Pierce MD 2018  6:31 AM      Hospital Problems as of 2018              Priority Class Noted POA    UTI (urinary tract infection) w hx of recurrence Medium  2014 Yes      Non-Hospital Problems as of 2018              Priority Class Noted    Dysphagia Medium  10/22/2012    Malignant neoplasm of prostate (H) Medium  10/22/2012    Malignant neoplasm of bladder (H) Medium  10/22/2012    Essential hypertension, benign Medium  10/22/2012    Asymptomatic varicose veins Medium  10/22/2012    Congenital hiatus hernia Medium  10/22/2012    Oral lesion Medium  2012    CTS (carpal tunnel syndrome) Medium  2013    Type 2 diabetes mellitus with stage 3 chronic kidney disease, with long-term current use of insulin (H) Medium  " 7/29/2013    Irritable bowel syndrome Medium  10/9/2013    Vitamin D deficiency disease Medium  10/9/2013    Microalbuminuria Medium  11/12/2013    Obesity Medium  8/12/2014    Iron deficiency anemia Medium  8/12/2014    Nonsmoker Medium  8/12/2014    A-fib (H) Medium  8/14/2014    Health Care Home Medium  8/19/2014    Hyperlipidemia Medium  Unknown    CKD (chronic kidney disease) stage 3, GFR 30-59 ml/min Medium  10/14/2015    Hypothyroidism Medium  2/29/2016    Long-term (current) use of anticoagulants [Z79.01] Medium  3/21/2016    Macrocytic anemia Medium  10/1/2016    GIB (gastrointestinal bleeding) Medium  11/13/2016    Multiple myeloma not having achieved remission (H) Medium  11/23/2016    Hypercalcemia Medium  12/15/2016    Hyperglycemia Medium  12/17/2016    Urinary tract infection Medium  2/14/2017    Hyperkalemia Medium  2/22/2017    ACP (advance care planning) Medium  2/28/2017    MDS (myelodysplastic syndrome) (H) Medium  5/17/2017    Chemotherapy-induced neutropenia (H) Medium  6/27/2017    Anemia in neoplastic disease Medium  6/27/2017      Code Status History     Date Active Date Inactive Code Status Order ID Comments User Context    12/18/2016  8:57 AM 1/20/2018  6:02 AM DNI [id number]  Alexandra Gay PA-C Outpatient    12/17/2016  3:47 PM 12/18/2016  8:57 AM DNI [id number]  Alexandra Gay PA-C ED    11/20/2016 10:20 AM 12/17/2016  3:47 PM Full Code [id number]  Mary Peters MD Outpatient    11/13/2016  5:58 PM 11/20/2016 10:20 AM Full Code [id number]  Alexys Fontenot MD Inpatient    8/15/2014 12:03 PM 11/13/2016  5:58 PM Full Code [id number]  Estela Mcelroy MD Outpatient    8/14/2014  1:18 PM 8/15/2014 12:03 PM Full Code [id number]  Estela Mcelroy MD Inpatient    11/6/2012  7:23 AM 11/9/2012  2:00 PM Full Code [id number]  Neeta Rincon, RN Inpatient    11/5/2012 11:23 AM 11/6/2012  7:23 AM Full Code [id number]  Alexandrea Jackson RN Inpatient         Medication  Review      START taking        Dose / Directions Comments    bisacodyl 10 MG Suppository   Commonly known as:  DULCOLAX   Used for:  Slow transit constipation        Dose:  10 mg   Place 1 suppository (10 mg) rectally daily as needed for constipation   Quantity:  30 suppository   Refills:  0        docusate sodium 100 MG capsule   Commonly known as:  COLACE   Used for:  Slow transit constipation        Dose:  100 mg   Take 1 capsule (100 mg) by mouth 2 times daily as needed for constipation   Quantity:  60 capsule   Refills:  0        ertapenem 1 GM injection   Commonly known as:  INVanz   Used for:  Urinary tract infection associated with indwelling urethral catheter, subsequent encounter        Dose:  0.5 g   Inject 0.5 g into the vein every 24 hours for 11 days   Quantity:  10 each   Refills:  0        polyethylene glycol Packet   Commonly known as:  MIRALAX/GLYCOLAX   Used for:  Slow transit constipation   Replaces:  polyethylene glycol powder        Dose:  17 g   Take 17 g by mouth 2 times daily as needed (constipation)   Quantity:  7 packet   Refills:  0          CONTINUE these medications which may have CHANGED, or have new prescriptions. If we are uncertain of the size of tablets/capsules you have at home, strength may be listed as something that might have changed.        Dose / Directions Comments    insulin aspart 100 UNIT/ML injection   Commonly known as:  NovoLOG PEN   This may have changed:  additional instructions   Used for:  Uncontrolled type 2 diabetes mellitus with hyperglycemia, unspecified long term insulin use status (H), Type 2 diabetes mellitus with diabetic chronic kidney disease, unspecified CKD stage, unspecified long term insulin use status (H), Type 2 diabetes mellitus with stage 3 chronic kidney disease, with long-term current use of insulin (H)        Inject 6 units before breakfast, 5 units before lunch, 4 units before dinner, and 4 units at bedtime. Hold for BS sugars.  Before meal  correction: 140-175 add 1 unit  176-210 add 2 units  211-245 add 3 units  246-280 add 4 units  281-315 add 5 units 316-350 add 6 units  351-385 add 7 units 386-420 add 8 units  Over 420 add 9 units   Quantity:  12 mL   Refills:  3        insulin detemir 100 UNIT/ML injection   Commonly known as:  LEVEMIR FLEXPEN/FLEXTOUCH   This may have changed:  additional instructions   Used for:  Type 2 diabetes mellitus with diabetic chronic kidney disease, unspecified CKD stage, unspecified long term insulin use status (H), Uncontrolled type 2 diabetes mellitus with hyperglycemia, unspecified long term insulin use status (H), Type 2 diabetes mellitus with stage 3 chronic kidney disease, with long-term current use of insulin (H)        Dose:  16 Units   Inject 16 Units Subcutaneous every morning (before breakfast)   Quantity:  15 mL   Refills:  1        LISINOPRIL PO   This may have changed:  Another medication with the same name was removed. Continue taking this medication, and follow the directions you see here.        Dose:  15 mg   Take 15 mg by mouth daily   Refills:  0          CONTINUE these medications which have NOT CHANGED        Dose / Directions Comments    acyclovir 400 MG tablet   Commonly known as:  ZOVIRAX   Used for:  Multiple myeloma not having achieved remission (H)        Dose:  400 mg   Take 1 tablet (400 mg) by mouth 2 times daily Viral Prophylaxis.   Quantity:  60 tablet   Refills:  3        amLODIPine 5 MG tablet   Commonly known as:  NORVASC   Used for:  Essential hypertension, benign        TAKE 1 TABLET BY MOUTH DAILY   Quantity:  90 tablet   Refills:  3        * B-D U/F 31G X 8 MM   Generic drug:  insulin pen needle        Refills:  0        * B-D U/F 31G X 8 MM   Used for:  Type 2 diabetes mellitus with stage 3 chronic kidney disease, with long-term current use of insulin (H)   Generic drug:  insulin pen needle        USE 5 PEN NEEDLES PER DAY OR AS DIRECTED   Quantity:  500 each   Refills:  1         ferrous sulfate 325 (65 FE) MG tablet   Commonly known as:  IRON        Dose:  325 mg   Take 325 mg by mouth daily (with breakfast)   Refills:  0        FREESTYLE LITE test strip   Used for:  Uncontrolled type 1 diabetes mellitus with stage 3 chronic kidney disease (H)   Generic drug:  blood glucose monitoring        USE 1 STRIP TO TEST TWICE DAILY.   Quantity:  200 strip   Refills:  1        furosemide 20 MG tablet   Commonly known as:  LASIX   Used for:  Multiple myeloma not having achieved remission (H)        Dose:  20 mg   Take 1 tablet (20 mg) by mouth daily   Quantity:  90 tablet   Refills:  1        gemfibrozil 600 MG tablet   Commonly known as:  LOPID   Used for:  Hyperlipidemia        TAKE 1 TABLET BY MOUTH TWICE DAILY   Quantity:  180 tablet   Refills:  1        levothyroxine 25 MCG tablet   Commonly known as:  SYNTHROID/LEVOTHROID   Used for:  Acquired hypothyroidism        TAKE 1 TABLET BY MOUTH EVERY DAY   Quantity:  90 tablet   Refills:  0        metoprolol tartrate 25 MG tablet   Commonly known as:  LOPRESSOR   Used for:  Essential hypertension        TAKE 1 TABLET BY MOUTH TWICE DAILY   Quantity:  180 tablet   Refills:  1        omeprazole 20 MG CR capsule   Commonly known as:  priLOSEC   Used for:  Congenital hiatus hernia        TAKE 1 CAPSULE BY MOUTH EVERY DAY   Quantity:  90 capsule   Refills:  3        ondansetron 8 MG tablet   Commonly known as:  ZOFRAN   Used for:  Multiple myeloma not having achieved remission (H)        Dose:  8 mg   Take 1 tablet (8 mg) by mouth every 8 hours as needed (Nausea/Vomiting)   Quantity:  10 tablet   Refills:  2        prochlorperazine 10 MG tablet   Commonly known as:  COMPAZINE   Used for:  Multiple myeloma not having achieved remission (H)        Dose:  10 mg   Take 1 tablet (10 mg) by mouth every 6 hours as needed (Nausea/Vomiting)   Quantity:  30 tablet   Refills:  2        Selenium 200 MCG Tabs        Dose:  100 mcg   Take 100 mcg by mouth daily. Pt  takes one half tab of the 200 mcg daily   Refills:  0        tamsulosin 0.4 MG capsule   Commonly known as:  FLOMAX   Used for:  Malignant neoplasm of prostate (H)        TAKE 1 CAPSULE BY MOUTH DAILY   Quantity:  90 capsule   Refills:  2        VITAMIN C PO        Dose:  1000 mg   Take 1,000 mg by mouth daily   Refills:  0        VITAMIN D3 PO        Dose:  1000 Units   Take 1,000 Units by mouth daily   Refills:  0        * Notice:  This list has 2 medication(s) that are the same as other medications prescribed for you. Read the directions carefully, and ask your doctor or other care provider to review them with you.      STOP taking     dexamethasone 4 MG tablet   Commonly known as:  DECADRON           glipiZIDE 10 MG tablet   Commonly known as:  GLUCOTROL           LORazepam 0.5 MG tablet   Commonly known as:  ATIVAN           polyethylene glycol powder   Commonly known as:  MIRALAX/GLYCOLAX   Replaced by:  polyethylene glycol Packet                     Further instructions from your care team       [facility]  [phone]    Summary of Visit     Reason for your hospital stay       You were admitted with a catheter-associated ESBL urinary tract infection and low cell counts following chemotherapy.             After Care     Activity - Up with nursing assistance           Advance Diet as Tolerated       Moderate Consistent CHO (4-6 CHO units/meal)       Tamayo catheter       To straight gravity drainage. Change catheter every 2 weeks and PRN for leaking or decreased uring output with signs of bladder distention. DO NOT change catheter without a specific MD order IF diagnosis of benign prostatic hypertrophy (BPH), neurogenic bladder, or other urological conditions       General info for SNF       Length of Stay Estimate: Short Term Care: Estimated # of Days <30  Condition at Discharge: Improving  Level of care:skilled   Rehabilitation Potential: Fair  Admission H&P remains valid  and up-to-date: Yes  Recent Chemotherapy: N/A  Use Nursing Home Standing Orders: Yes       Glucose monitor nursing POCT       Before meals and at bedtime       IV access       Midline.       Mantoux instructions       Give two-step Mantoux (PPD) Per Facility Policy Yes             Referrals     Occupational Therapy Adult Consult       Evaluate and treat as clinically indicated.    Reason:   Deconditioned secondary to acute illness.   .       Physical Therapy Adult Consult       Evaluate and treat as clinically indicated.    Reason:  Deconditioned secondary to acute illness.             Your next 10 appointments already scheduled     Jan 31, 2018 11:00 AM CST   Return Visit with  Oncology Nurse   [facility] ([facility])    [place] [address]  [place] [address]   [phone]            Jan 31, 2018 11:30 AM CST   Level 1 with  INFUSION CHAIR 16   [facility] ([facility])    [place] [id number]  [place] [address]   [phone]            Jan 31, 2018 11:45 AM CST   Return Visit with Gretel Quinn MD   [facility] ([facility])    [facility] [place] [id number]  [place] [address]   [phone]            Feb 01, 2018 10:00 AM CST   Level 2 with  INFUSION CHAIR 16   [facility] ([facility])    [facility]  [address]  [place] [address]   [phone]            Feb 07, 2018 10:30 AM CST   Level 2 with  INFUSION CHAIR 6   [facility] ([facility])    [facility]  [address]  [place] [address]   [phone]            Feb 08, 2018 10:30 AM CST   Level 2 with  INFUSION CHAIR 14   [facility] ([facility])    [facility]  [address]  [place] [address]   [phone]            Mar 13, 2018 11:00 AM CDT   Level 2 with SH INFUSION CHAIR 3   [facility] ([facility])    [facility] [place]  [address]  [place] [address]   [phone]            Mar 14, 2018 11:00 AM CDT   Level 1 with SH INFUSION CHAIR 17   [facility] ([facility])    [facility]  [address]  [place] [address]   [phone]              Follow-Up Appointment Instructions     Future Labs/Procedures    Follow Up and recommended labs and tests     Comments:    Folly up with primary care doctor at discharge from TCU.   Follow up with oncology on 1/31/2017.   Follow up with TCU provider to follow lab monitoring on ertapenem.   CBC, AST, BMP on Monday 1/29 med monitoring on ertapenem.      Follow-Up Appointment Instructions     Follow Up and recommended labs and tests       Folly up with primary care doctor at discharge from TCU.   Follow up with oncology on 1/31/2017.   Follow up with TCU provider to follow lab monitoring on ertapenem.   CBC, AST, BMP on Monday 1/29 med monitoring on ertapenem.             Statement of Approval     Ordered          01/23/18 1521  I have reviewed and agree with all the recommendations and orders detailed in this document.  EFFECTIVE NOW     Approved and electronically signed by:  Kellee Melo MD

## 2018-01-20 NOTE — IP AVS SNAPSHOT
[place] [address] [facility]    [address] [place] [address]    Phone:  [phone]                                       After Visit Summary   1/20/2018    Roc Parkinson    MRN: [id number]           After Visit Summary Signature Page     I have received my discharge instructions, and my questions have been answered. I have discussed any challenges I see with this plan with the nurse or doctor.    ..........................................................................................................................................  Patient/Patient Representative Signature      ..........................................................................................................................................  Patient Representative Print Name and Relationship to Patient    ..................................................               ................................................  Date                                            Time    ..........................................................................................................................................  Reviewed by Signature/Title    ...................................................              ..............................................  Date                                                            Time

## 2018-01-20 NOTE — IP AVS SNAPSHOT
` `     [place] [address] [facility]: [phone]            Medication Administration Report for Roc Parkinson as of 01/23/18 5590   Legend:    Given Hold Not Given Due Canceled Entry Other Actions    Time Time (Time) Time  Time-Action       Inactive    Active    Linked        Medications 01/17/18 01/18/18 01/19/18 01/20/18 01/21/18 01/22/18 01/23/18    acetaminophen (TYLENOL) Suppository 650 mg  Dose: 650 mg Freq: EVERY 4 HOURS PRN Route: RE  PRN Reason: mild pain  Start: 01/20/18 0602   Admin Instructions: Alternate ibuprofen (if ordered) with acetaminophen.  Maximum acetaminophen dose from all sources = 75 mg/kg/day not to exceed 4 grams/day.               acetaminophen (TYLENOL) tablet 650 mg  Dose: 650 mg Freq: EVERY 4 HOURS PRN Route: PO  PRN Reason: mild pain  Start: 01/20/18 0602   Admin Instructions: Alternate ibuprofen (if ordered) with acetaminophen.  Maximum acetaminophen dose from all sources = 75 mg/kg/day not to exceed 4 grams/day.               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  Start: 01/20/18 0900       0858 (400 mg)-Given       2038 (400 mg)-Given        0802 (400 mg)-Given       2134 (400 mg)-Given        0919 (400 mg)-Given       2033 (400 mg)-Given        0832 (400 mg)-Given       [ ] 2100           amLODIPine (NORVASC) tablet 5 mg  Dose: 5 mg Freq: DAILY Route: PO  Start: 01/20/18 0900       0858 (5 mg)-Given        0802 (5 mg)-Given        0919 (5 mg)-Given        0831 (5 mg)-Given           bisacodyl (DULCOLAX) Suppository 10 mg  Dose: 10 mg Freq: DAILY PRN Route: RE  PRN Reason: constipation  Start: 01/23/18 1258              docusate sodium (COLACE) capsule 100 mg  Dose: 100 mg Freq: 2 TIMES DAILY PRN Route: PO  PRN Reason: constipation  Start: 01/23/18 1258              ertapenem (INVanz) 1 g in 10 mL SWFI for IVP  Dose: 1 g Freq: EVERY 24 HOURS Route: IV  Indications of  Use: URINARY TRACT INFECTION  Start: 01/23/18 1115   Admin Instructions: Infuse over 30 minutes.  Give IVP over 5 minutes           1225 (1 g)-Given           ferrous sulfate (IRON) tablet 325 mg  Dose: 325 mg Freq: DAILY WITH BREAKFAST Route: PO  Start: 01/20/18 0900   Admin Instructions: Absorbed best on an empty stomach. If stomach upset occurs, can take with meals.        0858 (325 mg)-Given        1252 (325 mg)-Given        (1341)-Not Given [C]        0832 (325 mg)-Given           furosemide (LASIX) tablet 20 mg  Dose: 20 mg Freq: DAILY Route: PO  Start: 01/20/18 0900       0859 (20 mg)-Given        0802 (20 mg)-Given        0919 (20 mg)-Given        0832 (20 mg)-Given           gemfibrozil (LOPID) tablet 600 mg  Dose: 600 mg Freq: 2 TIMES DAILY Route: PO  Start: 01/20/18 0900   Admin Instructions: Take before meals.        0858 (600 mg)-Given       2038 (600 mg)-Given        0802 (600 mg)-Given       2134 (600 mg)-Given        0919 (600 mg)-Given       2033 (600 mg)-Given        0832 (600 mg)-Given       [ ] 2100           glucose 40 % gel 15-30 g  Dose: 15-30 g Freq: EVERY 15 MIN PRN Route: PO  PRN Reason: low blood sugar  Start: 01/20/18 0602   Admin Instructions: Give 15 g for BG 51 to 69 mg/dL IF patient is conscious and able to swallow. Give 30 g for BG less than or equal to 50 mg/dL IF patient is conscious and able to swallow. Do NOT give glucose gel via enteral tube.  IF patient has enteral tube: give apple juice 120 mL (4 oz or 15 g of CHO) via enteral tube for BG 51 to 69 mg/dL.  Give apple juice 240 mL (8 oz or 30 g of CHO) via enteral tube for BG less than or equal to 50 mg/dL.    ~Oral gel is preferable for conscious and able to swallow patient.   ~IF gel unavailable or patient refuses may provide apple juice 120 mL (4 oz or 15 g of CHO). Document juice on I and O flowsheet.              Or  dextrose 50 % injection 25-50 mL  Dose: 25-50 mL Freq: EVERY 15 MIN PRN Route: IV  PRN Reason: low blood  sugar  Start: 01/20/18 0602   Admin Instructions: Use if have IV access, BG less than 70 mg/dL and meet dose criteria below:  Dose if conscious and alert (or disorientated) and NPO = 25 mL  Dose if unconscious / not alert = 50 mL  Vesicant. For ordered doses up to 25 mg, give IV Push undiluted. Give each 5g over 1 minute.              Or  glucagon injection 1 mg  Dose: 1 mg Freq: EVERY 15 MIN PRN Route: SC  PRN Reason: low blood sugar  PRN Comment: May repeat x 1 only  Start: 01/20/18 0602   Admin Instructions: May give SQ or IM. ONLY use glucagon IF patient has NO IV access AND is UNABLE to swallow AND blood glucose is LESS than or EQUAL to 50 mg/dL.  Give IV Push over 1 minute. Reconstitute with 1mL sterile water.               hydrALAZINE (APRESOLINE) injection 10 mg  Dose: 10 mg Freq: EVERY 4 HOURS PRN Route: IV  PRN Reason: high blood pressure  PRN Comment: give for SBP > 180  Start: 01/20/18 0602   Admin Instructions: For ordered doses up to 40 mg, give IV Push undiluted over 1 minute.               insulin aspart (NovoLOG) inj (RAPID ACTING)  Dose: 9 Units Freq: DAILY WITH SUPPER Route: SC  Start: 01/22/18 1700   Admin Instructions: In addition to sliding scale  If given at mealtime, must be administered 5 min before meal or immediately after.          1713 (9 Units)-Given        [ ] 1700           insulin aspart (NovoLOG) inj (RAPID ACTING)  Dose: 11 Units Freq: DAILY WITH LUNCH Route: SC  Start: 01/23/18 1200   Admin Instructions: In addition to sliding scale  If given at mealtime, must be administered 5 min before meal or immediately after.           1322 (11 Units)-Given           insulin aspart (NovoLOG) inj (RAPID ACTING)  Dose: 13 Units Freq: DAILY WITH BREAKFAST Route: SC  Start: 01/23/18 0900   Admin Instructions: In addition to sliding scale  If given at mealtime, must be administered 5 min before meal or immediately after.           0918 (13 Units)-Given           insulin aspart (NovoLOG) inj  (RAPID ACTING)  Dose: 1-7 Units Freq: AT BEDTIME Route: SC  Start: 01/20/18 2200   Admin Instructions: HIGH INSULIN RESISTANCE DOSING    Do Not give Bedtime Correction Insulin if BG less than 200.   For  - 224 give 1 units.   For  - 249 give 2 units.   For  - 274 give 3 units.   For  - 299 give 4 units.   For  - 324 give 5 units.   For  - 349 give 6 units.   For BG greater than or equal to 350 give 7 units.   Notify provider if glucose greater than or equal to 350 mg/dL after administration of correction dose.  If given at mealtime, must be administered 5 min before meal or immediately after.        (2208)-Not Given [C]        (2130)-Not Given        (2101)-Not Given [C]        [ ] 2200           insulin aspart (NovoLOG) inj (RAPID ACTING)  Dose: 1-10 Units Freq: 3 TIMES DAILY BEFORE MEALS Route: SC  Start: 01/20/18 0730   Admin Instructions: Correction Scale - HIGH INSULIN RESISTANCE DOSING     Do Not give Correction Insulin if Pre-Meal BG less than 140.   For Pre-Meal  - 164 give 1 unit.   For Pre-Meal  - 189 give 2 units.   For Pre-Meal  - 214 give 3 units.   For Pre-Meal  - 239 give 4 units.   For Pre-Meal  - 264 give 5 units.   For Pre-Meal  - 289 give 6 units.   For Pre-Meal  - 314 give 7 units.   For Pre-Meal  - 339 give 8 units.   For Pre-Meal  - 364 give 9 units.   For Pre-Meal BG greater than or equal to 365 give 10 units  To be given with prandial insulin, and based on pre-meal blood glucose.   Notify provider if glucose greater than or equal to 350 mg/dL after administration of correction dose.  If given at mealtime, must be administered 5 min before meal or immediately after.        (0730)-Not Given       (1241)-Not Given       (1710)-Not Given [C]        (0659)-Not Given [C]       1253 (1 Units)-Given       1733 (1 Units)-Given        (0903)-Not Given       (1342)-Not Given [C]       (1627)-Not Given         "(0834)-Not Given       (1240)-Not Given       [ ] 1700           insulin detemir (LEVEMIR) injection 16 Units  Dose: 16 Units Freq: EVERY MORNING BEFORE BREAKFAST Route: SC  Start: 01/20/18 0730       0900 (16 Units)-Given        0802 (16 Units)-Given        0920 (16 Units)-Given        0834 (16 Units)-Given           labetalol (NORMODYNE/TRANDATE) injection 10 mg  Dose: 10 mg Freq: EVERY 2 HOURS PRN Route: IV  PRN Reason: high blood pressure  PRN Comment: give for SBP > 170  Start: 01/20/18 0602   Admin Instructions: Hold for HR < 60  For ordered doses up to 80 mg, give IV Push undiluted. Give each 20 mg over 2 minutes.               levothyroxine (SYNTHROID/LEVOTHROID) tablet 25 mcg  Dose: 25 mcg Freq: DAILY Route: PO  Start: 01/20/18 0900   Admin Instructions: Separate oral administration of iron- or calcium-containing products and levothyroxine by at least 4 hours.        0859 (25 mcg)-Given        0658 (25 mcg)-Given               0918 (25 mcg)-Given        0831 (25 mcg)-Given           lidocaine (LMX4) cream  Freq: EVERY 1 HOUR PRN Route: Top  PRN Reason: moderate pain  PRN Comment: with VAD insertion or accessing implanted port.  Start: 01/23/18 1100   Admin Instructions: Do NOT give if patient has a history of allergy to any local anesthetic or any \"chelsie\" product. Apply 30 minutes prior to VAD insertion or port access.   MAX Dose: 2.5 g (  of 5 g tube)                                lidocaine (LMX4) cream  Freq: EVERY 1 HOUR PRN Route: Top  PRN Reason: moderate pain  PRN Comment: with VAD insertion or accessing implanted port.  Start: 01/23/18 0807   Admin Instructions: Do NOT give if patient has a history of allergy to any local anesthetic or any \"chelsie\" product. Apply 30 minutes prior to VAD insertion or port access.   MAX Dose: 2.5 g (  of 5 g tube)                                lidocaine (LMX4) cream  Freq: EVERY 1 HOUR PRN Route: Top  PRN Reason: pain  PRN Comment: with VAD insertion or accessing " "implanted port.  Start: 01/20/18 0602   Admin Instructions: Do NOT give if patient has a history of allergy to any local anesthetic or any \"chelsie\" product.   Apply 30 minutes prior to VAD insertion or port access.  MAX Dose:  2.5 g (  of 5 g tube)               lidocaine 1 % 0.5-5 mL  Dose: 0.5-5 mL Freq: EVERY 1 HOUR PRN Route: OTHER  PRN Comment: mild pain with VAD insertion or accessing implanted port.  Start: 01/23/18 1100   Admin Instructions: Do NOT give if patient has a history of allergy to any local anesthetic or any \"chelsie\" product. MAX dose 1 mL subcutaneous OR intradermal in divided doses.               lidocaine 1 % 0.5-5 mL  Dose: 0.5-5 mL Freq: EVERY 1 HOUR PRN Route: OTHER  PRN Comment: mild pain with VAD insertion or accessing implanted port.  Start: 01/23/18 0807   Admin Instructions: Do NOT give if patient has a history of allergy to any local anesthetic or any \"chelsie\" product. MAX dose 1 mL subcutaneous OR intradermal in divided doses.               lidocaine 1 % 1 mL  Dose: 1 mL Freq: EVERY 1 HOUR PRN Route: OTHER  PRN Comment: mild pain with VAD insertion or accessing implanted port  Start: 01/20/18 0602   Admin Instructions: Do NOT give if patient has a history of allergy to any local anesthetic or any \"chelsie\" product. MAX dose 1 mL subcutaneous OR intradermal in divided doses.               lisinopril (PRINIVIL/ZESTRIL) tablet 15 mg  Dose: 15 mg Freq: DAILY Route: PO  Start: 01/22/18 1630         1713 (15 mg)-Given        0831 (15 mg)-Given           LORazepam (ATIVAN) tablet 0.5 mg  Dose: 0.5 mg Freq: EVERY 4 HOURS PRN Route: PO  PRN Comment: Anxiety, Nausea/Vomiting or Sleep  Start: 01/20/18 0602              magnesium sulfate 4 g in 100 mL sterile water (premade)  Dose: 4 g Freq: EVERY 4 HOURS PRN Route: IV  PRN Reason: magnesium supplementation  Start: 01/20/18 0602   Admin Instructions: For serum Mg++ less than 1.6 mg/dL  Give 4 g and recheck magnesium level 2 hours after dose, and next " AM.               Medication Instruction  Freq: CONTINUOUS PRN Route: XX  Start: 01/20/18 0602   Admin Instructions: No rectal suppositories if WBC less than 1000/microliter or platelets less than 50,000/microliter.               melatonin tablet 1 mg  Dose: 1 mg Freq: AT BEDTIME PRN Route: PO  PRN Reason: sleep  Start: 01/20/18 0602   Admin Instructions: Do not give unless at least 6 hours of uninterrupted sleep is expected.               metoprolol tartrate (LOPRESSOR) tablet 25 mg  Dose: 25 mg Freq: 2 TIMES DAILY Route: PO  Start: 01/20/18 0900       0859 (25 mg)-Given       2038 (25 mg)-Given        0802 (25 mg)-Given       2134 (25 mg)-Given        0918 (25 mg)-Given       2033 (25 mg)-Given        0832 (25 mg)-Given       [ ] 2100           naloxone (NARCAN) injection 0.1-0.4 mg  Dose: 0.1-0.4 mg Freq: EVERY 2 MIN PRN Route: IV  PRN Reason: opioid reversal  Start: 01/20/18 0602   Admin Instructions: For respiratory rate LESS than or EQUAL to 8.  Partial reversal dose:  0.1 mg titrated q 2 minutes for Analgesia Side Effects Monitoring Sedation Level of 3 (frequently drowsy, arousable, drifts to sleep during conversation).Full reversal dose:  0.4 mg bolus for Analgesia Side Effects Monitoring Sedation Level of 4 (somnolent, minimal or no response to stimulation).  For ordered doses up to 2mg give IVP. Give each 0.4mg over 15 seconds in emergency situations. For non-emergent situations further dilute in 9mL of NS to facilitate titration of response.               nitroGLYcerin (NITROSTAT) sublingual tablet 0.4 mg  Dose: 0.4 mg Freq: EVERY 5 MIN PRN Route: SL  PRN Reason: chest pain  Start: 01/20/18 0602   Admin Instructions: Maximum 3 doses in 15 minutes.  Notify provider if no relief after 3 doses.    Do NOT give nitroglycerin SL IF the patient has taken avanafil (STENDRA), sildenafil (VIAGRA) or (REVATIO) within the last 8 hours, vardenafil (LEVITRA) or (STAXYN) within the last 18 hours, tadalafil (CIALIS) or  (ADCIRCA) within the last 36 hours. Inform provider if patient has taken one of these medications.  If patient is still having acute angina requiring treatment, an alternative treatment option may be used such as: IV beta-blocker [2.5 mg - 5 mg metoprolol (LOPRESSOR)] if ordered by a provider.               omeprazole (priLOSEC) CR capsule 20 mg  Dose: 20 mg Freq: EVERY MORNING Route: PO  Start: 01/20/18 0900       0859 (20 mg)-Given        0802 (20 mg)-Given        0918 (20 mg)-Given        0831 (20 mg)-Given           ondansetron (ZOFRAN-ODT) ODT tab 4 mg  Dose: 4 mg Freq: EVERY 6 HOURS PRN Route: PO  PRN Reasons: nausea,vomiting  Start: 01/20/18 0602   Admin Instructions: This is Step 1 of nausea and vomiting management.  If nausea not resolved in 15 minutes, go to Step 2 prochlorperazine (COMPAZINE). Do not push through foil backing. Peel back foil and gently remove. Place on tongue immediately. Administration with liquid unnecessary  With dry hands, peel back foil backing and gently remove tablet; do not push oral disintegrating tablet through foil backing; administer immediately on tongue and oral disintegrating tablet dissolves in seconds; then swallow with saliva; liquid not required.              Or  ondansetron (ZOFRAN) injection 4 mg  Dose: 4 mg Freq: EVERY 6 HOURS PRN Route: IV  PRN Reasons: nausea,vomiting  Start: 01/20/18 0602   Admin Instructions: This is Step 1 of nausea and vomiting management.  If nausea not resolved in 15 minutes, go to Step 2 prochlorperazine (COMPAZINE).  Irritant. For ordered doses up to 4 mg, give IV Push undiluted over 2-5 minutes.               polyethylene glycol (MIRALAX/GLYCOLAX) Packet 17 g  Dose: 17 g Freq: 2 TIMES DAILY PRN Route: PO  PRN Comment: constipation  Start: 01/23/18 4973   Admin Instructions: 1 Packet = 17 grams. Mixed prescribed dose in 8 ounces of water. Follow with 8 oz. of water.           1500 (17 g)-Given           sennosides (SENOKOT) tablet 1-2  tablet  Dose: 1-2 tablet Freq: 2 TIMES DAILY Route: PO  Start: 01/23/18 1300          1502 (1 tablet)-Given       [ ] 2100           sodium chloride (PF) 0.9% PF flush 10 mL  Dose: 10 mL Freq: EVERY 8 HOURS Route: IK  Start: 01/23/18 1115   Admin Instructions: And Q1H PRN, to lock peripheral midline IV catheter dormant lumen. Recommended to flush Q8H each lumen even during infusions           1323 (10 mL)-Given       [ ] 1915           sodium chloride (PF) 0.9% PF flush 10 mL  Dose: 10 mL Freq: EVERY 8 HOURS Route: IK  Start: 01/23/18 1115   Admin Instructions: And Q1H PRN, to lock peripheral midline IV catheter dormant lumen. Recommended to flush Q8H each lumen even during infusions                  [ ] 1915           sodium chloride (PF) 0.9% PF flush 10 mL  Dose: 10 mL Freq: EVERY 8 HOURS Route: IK  Start: 01/23/18 0815   Admin Instructions: And Q1H PRN, to lock peripheral midline IV catheter dormant lumen. Recommended to flush Q8H each lumen even during infusions                  [ ] 1615           sodium chloride (PF) 0.9% PF flush 10-20 mL  Dose: 10-20 mL Freq: EVERY 1 HOUR PRN Route: IK  PRN Reasons: line flush,post meds or blood draw  PRN Comment: to flush peripheral midline IV catheter  Start: 01/23/18 1101   Admin Instructions: 10 mL post IV meds;  20 mL post blood draw               sodium chloride (PF) 0.9% PF flush 10-20 mL  Dose: 10-20 mL Freq: EVERY 1 HOUR PRN Route: IK  PRN Reasons: line flush,post meds or blood draw  PRN Comment: to flush each peripheral midline IV catheter lumen  Start: 01/23/18 1100   Admin Instructions: 10 mL post IV meds;  20 mL post blood draw               sodium chloride (PF) 0.9% PF flush 10-20 mL  Dose: 10-20 mL Freq: EVERY 1 HOUR PRN Route: IK  PRN Reasons: line flush,post meds or blood draw  PRN Comment: to flush each peripheral midline IV catheter lumen  Start: 01/23/18 0807   Admin Instructions: 10 mL post IV meds;  20 mL post blood draw               sodium chloride  (PF) 0.9% PF flush 3 mL  Dose: 3 mL Freq: EVERY 8 HOURS Route: IK  Start: 01/20/18 0615   Admin Instructions: And Q1H PRN, to lock peripheral IV dormant line.        (0645)-Not Given       (1346)-Not Given       (2209)-Not Given        (0658)-Not Given       (1352)-Not Given       2134 (3 mL)-Given        0617 (3 mL)-Given              2034 (3 mL)-Given        0545 (3 mL)-Given       0556 (3 mL)-Given       [ ] 1415       [ ] 2215           sodium chloride (PF) 0.9% PF flush 3 mL  Dose: 3 mL Freq: EVERY 1 HOUR PRN Route: IK  PRN Reason: line flush  PRN Comment: for peripheral IV flush post IV meds  Start: 01/20/18 0602              tamsulosin (FLOMAX) capsule 0.4 mg  Dose: 0.4 mg Freq: DAILY Route: PO  Start: 01/20/18 0900   Admin Instructions: Administer 30 minutes after the same meal each day.  Capsules should be swallowed whole; do not crush chew or open.        0859 (0.4 mg)-Given        0802 (0.4 mg)-Given        0918 (0.4 mg)-Given        0832 (0.4 mg)-Given          Completed Medications  Medications 01/17/18 01/18/18 01/19/18 01/20/18 01/21/18 01/22/18 01/23/18         Start: 01/21/18 1727   End: 01/21/18 1734   Admin Instructions: Kia Mcarthur : cabinet override         1734 (20 mL)-Given               Start: 01/21/18 0643   End: 01/21/18 0700   Admin Instructions: Roxy Whitfield : cabinet override         0700 (10 mL)-Given               Start: 01/21/18 0640   End: 01/21/18 0700   Admin Instructions: Roxy Whitfield : cabinet override         0700 (10 mL)-Given               Start: 01/20/18 1856   End: 01/20/18 1905   Admin Instructions: Nicolás Luis : cabinet override        1905 (20 mL)-Given             Discontinued Medications  Medications 01/17/18 01/18/18 01/19/18 01/20/18 01/21/18 01/22/18 01/23/18         Rate: 100 mL/hr Freq: CONTINUOUS Route: IV  Start: 01/20/18 0615   End: 01/21/18 0923       0642 ( )-New Bag       1712 ( )-New Bag        0324 ( )-New Bag       0923-Med Discontinued            Dose: 500 mg Freq: EVERY 24 HOURS Route: IV  Indications of Use: URINARY TRACT INFECTION  Start: 01/23/18 1015   End: 01/23/18 1103   Admin Instructions: Infuse over 30 minutes.  Give IVP over 5 minutes                  1103-Med Discontinued         Dose: 4-6 Units Freq: 3 TIMES DAILY WITH MEALS Route: SC  Start: 01/21/18 0930   End: 01/22/18 1638   Admin Instructions: Give 13 units before breakfast, 11 units before lunch, 9 units before supper. (In addition to sliding scale insulin)  If given at mealtime, must be administered 5 min before meal or immediately after.         (0938)-Not Given [C]       1252 (5 Units)-Given       1733 (4 Units)-Given        0920 (6 Units)-Given       (1342)-Not Given [C]       1638-Med Discontinued          Dose: 1 g Freq: EVERY 12 HOURS Route: IV  Indications of Use: URINARY TRACT INFECTION  Start: 01/23/18 1800   End: 01/23/18 1014   Admin Instructions: Reconstitute vial with 20 mL Sterile Water for Injection and give IVP immediately over 3-5 minutes.  Nursing to reconstitute vial with 10 mL Sterile Water for Injection and give IVP immediately over 3-5 minutes           1014-Med Discontinued         Dose: 1 g Freq: EVERY 12 HOURS Route: IV  Indications of Use: URINARY TRACT INFECTION  Start: 01/20/18 1800   End: 01/23/18 0834   Admin Instructions: Reconstitute vial with 20 mL Sterile Water for Injection and give IVP immediately over 3-5 minutes.  Reconstitute vial with 20 mL Sterile Water for Injection and give IVP immediately over 3-5 minutes.        1905 (1 g)-Given        0659 (1 g)-Given       1734 (1 g)-Given        0616 (1 g)-Given       1713 (1 g)-Given        0545 (1 g)-Given       0834-Med Discontinued    Medications 01/17/18 01/18/18 01/19/18 01/20/18 01/21/18 01/22/18 01/23/18

## 2018-01-20 NOTE — H&P
PRIMARY CARE PHYSICIAN:  Dickson Reich MD      CHIEF COMPLAINT:  Dizziness and weakness.      HISTORY OF PRESENT ILLNESS:  Roc Parkinson is a 89-year-old male accompanied by his son, past medical history notable for multiple myeloma on chemotherapy, myelodysplastic syndrome, prostate cancer status post EBRT and brachytherapy, superficial bladder cancer, hypertension, urinary tract infection notable for ESBL, CKD stage III, A-Fib, no longer on anticoagulation due to lower GI bleed, diabetes mellitus type 2 on insulin, who presents for the above-mentioned complaints.  The patient states over the past several weeks, he has had at least 2 falls due to weakness and dizziness.  He notes falling onto his back but is now complaining of right-sided chest discomfort, as well.  He has been getting chemotherapy, he follows with Dr. Quinn of Oncology and notes that his blood pressures have been running elevated while at his chemo sessions.  His chest pressure has been somewhat increasing and more so overnight.  He notes that his appetite is reduced, and he has not been drinking as much fluids as he should.  He notes dizziness while standing up, as well.  No reported trauma to his head.  Denies shortness of breath, abdominal pain, nausea, vomiting, diarrhea nor any fevers, productive cough or chills.  He has been compliant with his medications.      PAST MEDICAL HISTORY:   1.  Notable for Vitamin D deficiency   2.  Urinary tract infection with ESBL.   3.  Chronic kidney disease, stage III, and type 2 diabetes mellitus.   4.  Multiple myeloma, currently on chemotherapy.   5.  Myelodysplastic syndrome.   6.  Prostate cancer.   7.  Bladder cancer.   8.  Macrocytic anemia.   9.  IBS.   10.  Iron deficiency anemia.   11.  Hypothyroidism.   12.  Hyperlipidemia.   13.  Hyperglycemia.   14.  Hypertension.   15.  Dysphagia.   16.  Carpal tunnel syndrome.   17.  Congenital hiatal hernia.   18.  Chemo-induced neutropenia.   19.  Atrial  fibrillation, no longer on anticoagulation due to lower GI bleed.      MEDICATIONS:    Prescriptions Prior to Admission   Medication Sig Dispense Refill Last Dose     LISINOPRIL PO Take 15 mg by mouth daily 1/2 of 30 mg        amLODIPine (NORVASC) 5 MG tablet TAKE 1 TABLET BY MOUTH DAILY 90 tablet 3 Taking     furosemide (LASIX) 20 MG tablet Take 1 tablet (20 mg) by mouth daily 90 tablet 1 Taking     LORazepam (ATIVAN) 0.5 MG tablet Take 1 tablet (0.5 mg) by mouth every 4 hours as needed (Anxiety, Nausea/Vomiting or Sleep) 30 tablet 2 Taking     prochlorperazine (COMPAZINE) 10 MG tablet Take 1 tablet (10 mg) by mouth every 6 hours as needed (Nausea/Vomiting) 30 tablet 2 Taking     dexamethasone (DECADRON) 4 MG tablet Take 5 tablets (20 mg) by mouth with food. Days 1, 2, 8, 9, 15, 16, 22, and 23. On carfilzomib days, take prior to carfilzomib dose. 40 tablet 0 1/18/2018     insulin aspart (NOVOLOG PEN) 100 UNIT/ML injection Inject 13 units before breakfast, 11 units before lunch, 9 units before dinner, and 4 units at bedtime.  Before meal correction:  140-175 add 1 unit   176-210 add 2 units   211-245 add 3 units   246-280 add 4 units   281-315 add 5 units  316-350 add 6 units   351-385 add 7 units  386-420 add 8 units   Over 420 add 9 units     12 mL 3 Taking     insulin detemir (LEVEMIR FLEXPEN/FLEXTOUCH) 100 UNIT/ML injection Inject 16 Units Subcutaneous every morning (before breakfast) On Wednesday and Thursday morning, inject 18 units. 15 mL 1 Taking     glipiZIDE (GLUCOTROL) 10 MG tablet Take 1 tablet (10 mg) by mouth daily (with breakfast) 180 tablet 1 Taking     gemfibrozil (LOPID) 600 MG tablet TAKE 1 TABLET BY MOUTH TWICE DAILY 180 tablet 1 Taking     levothyroxine (SYNTHROID/LEVOTHROID) 25 MCG tablet TAKE 1 TABLET BY MOUTH EVERY DAY 90 tablet 0 Taking     tamsulosin (FLOMAX) 0.4 MG capsule TAKE 1 CAPSULE BY MOUTH DAILY 90 capsule 2 Taking     metoprolol (LOPRESSOR) 25 MG tablet TAKE 1 TABLET BY MOUTH TWICE  DAILY 180 tablet 1 Taking     omeprazole (PRILOSEC) 20 MG CR capsule TAKE 1 CAPSULE BY MOUTH EVERY DAY 90 capsule 3 Taking     acyclovir (ZOVIRAX) 400 MG tablet Take 1 tablet (400 mg) by mouth 2 times daily Viral Prophylaxis. 60 tablet 3 Taking     ondansetron (ZOFRAN) 8 MG tablet Take 1 tablet (8 mg) by mouth every 8 hours as needed (Nausea/Vomiting) 10 tablet 2 Taking     Ascorbic Acid (VITAMIN C PO) Take 1,000 mg by mouth daily   Taking     Cholecalciferol (VITAMIN D3 PO) Take 1,000 Units by mouth daily   Taking     ferrous sulfate (IRON) 325 (65 FE) MG tablet Take 325 mg by mouth daily (with breakfast)   Taking     Selenium 200 MCG TABS Take 100 mcg by mouth daily. Pt takes one half tab of the 200 mcg daily    Taking     B-D U/F 31G X 8 MM insulin pen needle USE 5 PEN NEEDLES PER DAY OR AS DIRECTED 500 each 1 Taking     FREESTYLE LITE test strip USE 1 STRIP TO TEST TWICE DAILY. 200 strip 1 Taking     B-D U/F 31G X 8 MM insulin pen needle    Taking     polyethylene glycol (MIRALAX/GLYCOLAX) powder    Taking         ALLERGIES:  No known drug allergies.      SOCIAL HISTORY:  The patient denies tobacco or alcohol use.  Presently lives in [place] in his own house and is independent, manages his own ADLs and continues to drive.      FAMILY HISTORY:  Heart disease in his father and brother with Paget's disease.      REVIEW OF SYSTEMS:  A 10-point review of systems was performed and negative except as per HPI.      PHYSICAL EXAMINATION:   VITAL SIGNS:  Temperature 97.8 degrees Fahrenheit, heart rate 60s-80s, blood pressure initially 161/85, on recheck 191/94, respirations 10-20, saturating greater than 96% on room air.  Weight is 90.7 kg.   GENERAL:  No acute distress, son is at bedside, appears stated age, comfortably lying flat in the ER bed.   HEENT:  Normocephalic, atraumatic.  Extraocular motions intact, dry mucous membranes, anicteric sclerae, uvula midline.   NECK:  No lymphadenopathy.  Trachea midline.    CARDIOVASCULAR:  Regular rate and rhythm.  No additional heart sounds.   PULMONARY:  Clear to auscultation bilaterally.   ABDOMEN:  Bowel sounds positive.  Soft, nondistended, nontender.   CHEST WALL:  There is tenderness to palpation over the right chest wall, no rashes or bruising noted there.   EXTREMITIES:  Trace pretibial edema, warm extremities.  DP pulses are equal bilaterally.   NEUROLOGIC:  Moves all 4 extremities.  Cranial nerves II-XII grossly intact.   PSYCHIATRIC:  Appropriate mood and affect, oriented x3.   SKIN:  No rashes or bruising noted.      LABORATORY DATA:  CMP shows a creatinine of 1.45, BUN of 45, GFR of 46, phosphorus 2.2, albumin 2.6.  Initial troponin is negative.  Glucose is 75.  CBC shows a white count 2.3, hemoglobin 8.1, platelet count 70,000.  INR is 1.09, PTT 25.  Urinalysis shows 10 of protein, positive nitrites, large leukocyte esterase, 44 WBCs, few bacteria, presence of mucus.  Blood cultures are pending.  Urine culture is pending.  EKG shows sinus rhythm with a new right bundle branch block, QTc of 439, no acute ST-T wave changes indicative of active ischemia.      IMAGING:  Chest x-ray shows no acute findings.      ASSESSMENT:  Roc Parkinson is a 89-year-old male with a complex past medical history as outlined above who presents due to dizziness and chest pressure.  He was found to have a significantly elevated blood pressure in the emergency department as well as paroxysms of likely atrial tachycardia up into the 120s in the ED.  He is admitted for further management of UTI in the setting of immunocompromised state.      PLAN:   1.  Chest pressure secondary to hypertensive urgency versus chest wall strain.  The patient noted to have blood pressures up to 191/94 and possible correlation with chest pressure when his blood pressures are elevated.  He was also found to have likely atrial tachycardia on telemetry up into the 120s.  His initial EKG shows a new right bundle  branch block.  Troponin is negative.  He recently had an echocardiogram on 01/11/2018 showing a normal LV function, no wall motion changes, no pericardial effusion, and impaired LV relaxation.  He has been compliant with his antihypertensive medications.  He has reproducible right-sided chest discomfort to palpation, it is possible that with his recent falls he has a chest wall strain.   A.  We will trend troponins.  Place on telemetry.   B.  Resume prior to admission Norvasc and metoprolol, holding prior to admission lisinopril given ROMINA as outlined below.   C.  Hydralazine and labetalol p.r.n., being careful not to reduce blood pressures by greater than 25% in the first 24 hours.     2.  Urinary tract infection.  The patient has a history of ESBL UTIs.  He remains afebrile, however, is immunocompromised with ongoing chemotherapy.   A.  Continue meropenem started in the Emergency Department, follow blood and urine cultures.     3.  Acute kidney injury on chronic kidney disease, stage III.  Baseline creatinine is 0.9 to 1.1.  His admission creatinine was 1.45 with a BUN of 45.  The patient endorses reduced fluid intake over the past couple of weeks.  He was given a 1 liter lactated ringer's bolus in the Emergency Department.   A.  Continue IV fluids, normal saline at 100 mL per hour.   B.  Monitor I's and O's and daily weights.   C.  Recheck BMP in a.m.     4.  History of multiple myeloma, myelodysplastic syndrome, prostate cancer, and superficial bladder cancer.  The patient follows with Dr. Quinn of Oncology.  He is actively undergoing chemotherapy, most recent session was last week.  He is found to have pancytopenia secondary to his chemotherapy.   A.  Admit to Oncology floor, treatment of urinary tract infection as outlined above.   B.  Neutropenia precautions.   C.  Follow up with Oncology as outpatient for resumption of chemotherapy once infection clears.   D.  PT has been ordered for deconditioning.     5.   History of diabetes mellitus type 2, most recent A1c was 6.3 on 2018.   A.  Resume prior to admission Levemir 16 units daily in the morning.   B.  Holding prior to admission NovoLog premeal and prior to admission Glucotrol.   C.  Mod carbohydrate diet, blood sugar checks q.i.d.     6.  History of hypertension.  Blood pressures elevated up to 191/94.  The patient states being compliant with his medications.   A.  Management as outlined above.   B.  Given ROMINA, holding prior to admission lisinopril as outlined above.   C.  PTA Norvasc and amlodipine ordered, along with p.r.n. labetalol and IV hydralazine.     7.  History of BPH, resume prior to admission Flomax.     8.  Hypothyroidism, resume prior to admission levothyroxine.     9.  Atrial tachycardia noted in the Emergency Department.  Management as outlined above.   A.  Resume prior to admission beta blocker as noted with p.r.n. labetalol.   B.  May be secondary to his underlying urinary tract infection and immunocompromised state, continue to monitor closely on telemetry.     10.  Deep venous thrombosis prophylaxis with PCDs.      I personally discussed code status with the patient, and he clearly states being a DNR/DNI.         SIMEON PA MD             D: 2018 05:26   T: 2018 12:06   MT: ALBERTO      Name:     JACOB MEDELLIN   MRN:      [id number]        Account:      [id number]   :      1926           Admitted:     [id number]      Document: [id number]

## 2018-01-20 NOTE — IP AVS SNAPSHOT
MRN:[id number]                      After Visit Summary   1/20/2018    Roc Parkinson    MRN: [id number]           Thank you!     Thank you for choosing [facility] for your care. Our goal is always to provide you with excellent care. Hearing back from our patients is one way we can continue to improve our services. Please take a few minutes to complete the written survey that you may receive in the mail after you visit with us. Thank you!        Patient Information     Date Of Birth          7/7/1926        Designated Caregiver       Most Recent Value    Caregiver    Will someone help with your care after discharge? no      About your hospital stay     You were admitted on:  January 20, 2018 You last received care in the:  [facility] [address] [facility]    You were discharged on:  January 23, 2018        Reason for your hospital stay       You were admitted with urinary tract infection and low cell counts following chemotherapy.                  Who to Call     For medical emergencies, please call 911.  For non-urgent questions about your medical care, please call your primary care provider or clinic, [phone]          Attending Provider     Provider Specialty    Parminder Pierce MD Emergency Medicine    [facility]       Primary Care Provider Office Phone # Fax #    Dickson Reich [phone]      After Care Instructions     Activity - Up with nursing assistance           Advance Diet as Tolerated       Moderate Consistent CHO (4-6 CHO units/meal)            Tamayo catheter       To straight gravity drainage. Change catheter every 2 weeks and PRN for leaking or decreased uring output with signs of bladder distention. DO NOT change catheter without a specific MD order IF diagnosis of benign prostatic hypertrophy (BPH), neurogenic bladder, or other urological conditions            General info for SNF       Length of Stay  Estimate: Short Term Care: Estimated # of Days <30  Condition at Discharge: Improving  Level of care:skilled   Rehabilitation Potential: Fair  Admission H&P remains valid and up-to-date: Yes  Recent Chemotherapy: N/A  Use Nursing Home Standing Orders: Yes            Glucose monitor nursing POCT       Before meals and at bedtime            IV access       Midline.            Mantoux instructions       Give two-step Mantoux (PPD) Per Facility Policy Yes                  Follow-up Appointments     Follow Up and recommended labs and tests       Folly up with primary care doctor at discharge from TCU.   Follow up with oncology on 1/31/2017.   Follow up with TCU provider to follow lab monitoring on ertapenem.   CBC, AST, BMP on Monday 1/29 med monitoring on ertapenem.                  Your next 10 appointments already scheduled     Jan 31, 2018 11:00 AM CST   Return Visit with  Oncology Nurse   [facility] ([facility])    Replaced by [facility]  [address]  [place] [address]   [phone]            Jan 31, 2018 11:30 AM CST   Level 1 with  INFUSION CHAIR 16   [facility] ([facility])    [facility]  [address]  [place] [address]   [phone]            Jan 31, 2018 11:45 AM CST   Return Visit with Gretel Quinn MD   [facility] ([facility])    [facility]  [address]  [place] [address]   [phone]            Feb 01, 2018 10:00 AM CST   Level 2 with  INFUSION CHAIR 16   [facility] ([facility])    [facility]  [address]  [place] [address]   [phone]            Feb 07, 2018 10:30 AM CST   Level 2 with  INFUSION CHAIR 6   [facility] ([facility])    [facility]  [place]  [address]  [place] [address]   [phone]            Feb 08, 2018 10:30 AM CST   Level 2 with SH INFUSION CHAIR 14   [facility] ([facility])    [facility] [place] [address]  [place] [address]   [phone]            Mar 13, 2018 11:00 AM CDT   Level 2 with SH INFUSION CHAIR 3   [facility] ([facility])    [facility] [place]  [address]  [place] [address]   [phone]            Mar 14, 2018 11:00 AM CDT   Level 1 with SH INFUSION CHAIR 17   [facility] ([facility])    [facility] [place]  [address]  [place] [address]   [phone]              Additional Services     Occupational Therapy Adult Consult       Evaluate and treat as clinically indicated.    Reason:   Deconditioned secondary to acute illness.   .            Physical Therapy Adult Consult       Evaluate and treat as clinically indicated.    Reason:  Deconditioned secondary to acute illness.                  Further instructions from your care team       Discharge to [facility] [place]  [phone]    Pending Results     Date and Time Order Name Status Description    1/20/2018 0413 Blood culture Preliminary     1/20/2018 0413 Blood culture Preliminary             Statement of Approval     Ordered          01/23/18 1604  I have reviewed and agree with all the recommendations and orders detailed in this document.  EFFECTIVE NOW     Approved and electronically signed by:  Kellee Melo MD           01/23/18 1521  I have reviewed and agree with all the recommendations and orders detailed in this document.  EFFECTIVE NOW     Approved and electronically signed by:  Kellee Melo MD             Admission Information     Date & Time Provider Department Dept. Phone    1/20/2018  "Martha Bobby MD [place] [address] [facility] [phone]      Your Vitals Were     Blood Pressure Pulse Temperature Respirations Height Weight    165/75 (BP Location: Right arm) 78 98.6  F (37  C) (Oral) 16 1.753 m (5' 9\") 91.5 kg (201 lb 11.5 oz)    Pulse Oximetry BMI (Body Mass Index)                93% 29.79 kg/m2          Novavax Information     Nov[employer]ax lets you send messages to your doctor, view your test results, renew your prescriptions, schedule appointments and more. To sign up, go to [email or web address] . Click on \"Log in\" on the left side of the screen, which will take you to the Welcome page. Then click on \"Sign up Now\" on the right side of the page.     You will be asked to enter the access code listed below, as well as some personal information. Please follow the directions to create your username and password.     Your access code is: [email or web address]  Expires: 3/26/2018 11:10 AM     Your access code will  in 90 days. If you need help or a new code, please call your [place] clinic or [phone].        Care EveryWhere ID     This is your Care EveryWhere ID. This could be used by other organizations to access your [place] medical records  [id number]        Equal Access to Services     ELIE GAGNON AH: Jeanne Driver, consuelo zamarripa, qaybta melita mchugh . So [facility] [phone].    ATENCIÓN: Si habla español, tiene a nolan disposición servicios gratuitos de asistencia lingüística. Eric al [phone].    We comply with applicable federal civil rights laws and [place] laws. We do not discriminate on the basis of race, color, national origin, age, disability, sex, sexual orientation, or gender identity.               Review of your medicines      START taking        Dose / Directions    bisacodyl 10 MG Suppository   Commonly known as:  DULCOLAX   Used for:  Slow transit constipation        Dose:  10 mg   Place " 1 suppository (10 mg) rectally daily as needed for constipation   Quantity:  30 suppository   Refills:  0       docusate sodium 100 MG capsule   Commonly known as:  COLACE   Used for:  Slow transit constipation        Dose:  100 mg   Take 1 capsule (100 mg) by mouth 2 times daily as needed for constipation   Quantity:  60 capsule   Refills:  0       ertapenem 1 GM injection   Commonly known as:  INVanz   Used for:  Urinary tract infection associated with indwelling urethral catheter, subsequent encounter        Dose:  0.5 g   Inject 0.5 g into the vein every 24 hours   Quantity:  7 each   Refills:  0       polyethylene glycol Packet   Commonly known as:  MIRALAX/GLYCOLAX   Used for:  Slow transit constipation   Replaces:  polyethylene glycol powder        Dose:  17 g   Take 17 g by mouth 2 times daily as needed (constipation)   Quantity:  7 packet   Refills:  0         CONTINUE these medicines which may have CHANGED, or have new prescriptions. If we are uncertain of the size of tablets/capsules you have at home, strength may be listed as something that might have changed.        Dose / Directions    insulin aspart 100 UNIT/ML injection   Commonly known as:  NovoLOG PEN   This may have changed:  additional instructions   Used for:  Uncontrolled type 2 diabetes mellitus with hyperglycemia, unspecified long term insulin use status (H), Type 2 diabetes mellitus with diabetic chronic kidney disease, unspecified CKD stage, unspecified long term insulin use status (H), Type 2 diabetes mellitus with stage 3 chronic kidney disease, with long-term current use of insulin (H)        Inject 6 units before breakfast, 5 units before lunch, 4 units before dinner, and 4 units at bedtime. Hold for BS sugars.  Before meal correction: 140-175 add 1 unit  176-210 add 2 units  211-245 add 3 units  246-280 add 4 units  281-315 add 5 units 316-350 add 6 units  351-385 add 7 units 386-420 add 8 units  Over 420 add 9 units   Quantity:  12 mL    Refills:  3       insulin detemir 100 UNIT/ML injection   Commonly known as:  LEVEMIR FLEXPEN/FLEXTOUCH   This may have changed:  additional instructions   Used for:  Type 2 diabetes mellitus with diabetic chronic kidney disease, unspecified CKD stage, unspecified long term insulin use status (H), Uncontrolled type 2 diabetes mellitus with hyperglycemia, unspecified long term insulin use status (H), Type 2 diabetes mellitus with stage 3 chronic kidney disease, with long-term current use of insulin (H)        Dose:  16 Units   Inject 16 Units Subcutaneous every morning (before breakfast)   Quantity:  15 mL   Refills:  1       LISINOPRIL PO   This may have changed:  Another medication with the same name was removed. Continue taking this medication, and follow the directions you see here.        Dose:  15 mg   Take 15 mg by mouth daily   Refills:  0         CONTINUE these medicines which have NOT CHANGED        Dose / Directions    acyclovir 400 MG tablet   Commonly known as:  ZOVIRAX   Used for:  Multiple myeloma not having achieved remission (H)        Dose:  400 mg   Take 1 tablet (400 mg) by mouth 2 times daily Viral Prophylaxis.   Quantity:  60 tablet   Refills:  3       amLODIPine 5 MG tablet   Commonly known as:  NORVASC   Used for:  Essential hypertension, benign        TAKE 1 TABLET BY MOUTH DAILY   Quantity:  90 tablet   Refills:  3       * B-D U/F 31G X 8 MM   Generic drug:  insulin pen needle        Refills:  0       * B-D U/F 31G X 8 MM   Used for:  Type 2 diabetes mellitus with stage 3 chronic kidney disease, with long-term current use of insulin (H)   Generic drug:  insulin pen needle        USE 5 PEN NEEDLES PER DAY OR AS DIRECTED   Quantity:  500 each   Refills:  1       ferrous sulfate 325 (65 FE) MG tablet   Commonly known as:  IRON        Dose:  325 mg   Take 325 mg by mouth daily (with breakfast)   Refills:  0       FREESTYLE LITE test strip   Used for:  Uncontrolled type 1 diabetes mellitus with  stage 3 chronic kidney disease (H)   Generic drug:  blood glucose monitoring        USE 1 STRIP TO TEST TWICE DAILY.   Quantity:  200 strip   Refills:  1       furosemide 20 MG tablet   Commonly known as:  LASIX   Used for:  Multiple myeloma not having achieved remission (H)        Dose:  20 mg   Take 1 tablet (20 mg) by mouth daily   Quantity:  90 tablet   Refills:  1       gemfibrozil 600 MG tablet   Commonly known as:  LOPID   Used for:  Hyperlipidemia        TAKE 1 TABLET BY MOUTH TWICE DAILY   Quantity:  180 tablet   Refills:  1       levothyroxine 25 MCG tablet   Commonly known as:  SYNTHROID/LEVOTHROID   Used for:  Acquired hypothyroidism        TAKE 1 TABLET BY MOUTH EVERY DAY   Quantity:  90 tablet   Refills:  0       metoprolol tartrate 25 MG tablet   Commonly known as:  LOPRESSOR   Used for:  Essential hypertension        TAKE 1 TABLET BY MOUTH TWICE DAILY   Quantity:  180 tablet   Refills:  1       omeprazole 20 MG CR capsule   Commonly known as:  priLOSEC   Used for:  Congenital hiatus hernia        TAKE 1 CAPSULE BY MOUTH EVERY DAY   Quantity:  90 capsule   Refills:  3       ondansetron 8 MG tablet   Commonly known as:  ZOFRAN   Used for:  Multiple myeloma not having achieved remission (H)        Dose:  8 mg   Take 1 tablet (8 mg) by mouth every 8 hours as needed (Nausea/Vomiting)   Quantity:  10 tablet   Refills:  2       prochlorperazine 10 MG tablet   Commonly known as:  COMPAZINE   Used for:  Multiple myeloma not having achieved remission (H)        Dose:  10 mg   Take 1 tablet (10 mg) by mouth every 6 hours as needed (Nausea/Vomiting)   Quantity:  30 tablet   Refills:  2       Selenium 200 MCG Tabs        Dose:  100 mcg   Take 100 mcg by mouth daily. Pt takes one half tab of the 200 mcg daily   Refills:  0       tamsulosin 0.4 MG capsule   Commonly known as:  FLOMAX   Used for:  Malignant neoplasm of prostate (H)        TAKE 1 CAPSULE BY MOUTH DAILY   Quantity:  90 capsule   Refills:  2        VITAMIN C PO        Dose:  1000 mg   Take 1,000 mg by mouth daily   Refills:  0       VITAMIN D3 PO        Dose:  1000 Units   Take 1,000 Units by mouth daily   Refills:  0       * Notice:  This list has 2 medication(s) that are the same as other medications prescribed for you. Read the directions carefully, and ask your doctor or other care provider to review them with you.      STOP taking     dexamethasone 4 MG tablet   Commonly known as:  DECADRON           glipiZIDE 10 MG tablet   Commonly known as:  GLUCOTROL           LORazepam 0.5 MG tablet   Commonly known as:  ATIVAN           polyethylene glycol powder   Commonly known as:  MIRALAX/GLYCOLAX   Replaced by:  polyethylene glycol Packet                Where to get your medicines      Some of these will need a paper prescription and others can be bought over the counter. Ask your nurse if you have questions.     You don't need a prescription for these medications     bisacodyl 10 MG Suppository    docusate sodium 100 MG capsule    ertapenem 1 GM injection    insulin aspart 100 UNIT/ML injection    insulin detemir 100 UNIT/ML injection    polyethylene glycol Packet               ANTIBIOTIC INSTRUCTION     You've Been Prescribed an Antibiotic - Now What?  Your healthcare team thinks that you or your loved one might have an infection. Some infections can be treated with antibiotics, which are powerful, life-saving drugs. Like all medications, antibiotics have side effects and should only be used when necessary. There are some important things you should know about your antibiotic treatment.      Your healthcare team may run tests before you start taking an antibiotic.    Your team may take samples (e.g., from your blood, urine or other areas) to run tests to look for bacteria. These test can be important to determine if you need an antibiotic at all and, if you do, which antibiotic will work best.      Within a few days, your healthcare team might change or even  stop your antibiotic.    Your team may start you on an antibiotic while they are working to find out what is making you sick.    Your team might change your antibiotic because test results show that a different antibiotic would be better to treat your infection.    In some cases, once your team has more information, they learn that you do not need an antibiotic at all. They may find out that you don't have an infection, or that the antibiotic you're taking won't work against your infection. For example, an infection caused by a virus can't be treated with antibiotics. Staying on an antibiotic when you don't need it is more likely to be harmful than helpful.      You may experience side effects from your antibiotic.    Like all medications, antibiotics have side effects. Some of these can be serious.    Let you healthcare team know if you have any known allergies when you are admitted to the hospital.    One significant side effect of nearly all antibiotics is the risk of severe and sometimes deadly diarrhea caused by Clostridium difficile (C. Difficile). This occurs when a person takes antibiotics because some good germs are destroyed. Antibiotic use allows C. diificile to take over, putting patients at high risk for this serious infection.    As a patient or caregiver, it is important to understand your or your loved one's antibiotic treatment. It is especially important for caregivers to speak up when patients can't speak for themselves. Here are some important questions to ask your healthcare team.    What infection is this antibiotic treating and how do you know I have that infection?    What side effects might occur from this antibiotic?    How long will I need to take this antibiotic?    Is it safe to take this antibiotic with other medications or supplements (e.g., vitamins) that I am taking?     Are there any special directions I need to know about taking this antibiotic? For example, should I take it with  food?    How will I be monitored to know whether my infection is responding to the antibiotic?    What tests may help to make sure the right antibiotic is prescribed for me?      Information provided by:  www.cdc.gov/getsmart  U.S. Department of Health and Human Services  Centers for disease Control and Prevention  National Center for Emerging and Zoonotic Infectious Diseases  Division of Healthcare Quality Promotion         Protect others around you: Learn how to safely use, store and throw away your medicines at www.disposemymeds.org.             Medication List: This is a list of all your medications and when to take them. Check marks below indicate your daily home schedule. Keep this list as a reference.      Medications           Morning Afternoon Evening Bedtime As Needed    acyclovir 400 MG tablet   Commonly known as:  ZOVIRAX   Take 1 tablet (400 mg) by mouth 2 times daily Viral Prophylaxis.   Last time this was given:  400 mg on 1/23/2018  8:32 AM                                amLODIPine 5 MG tablet   Commonly known as:  NORVASC   TAKE 1 TABLET BY MOUTH DAILY   Last time this was given:  5 mg on 1/23/2018  8:31 AM                                * B-D U/F 31G X 8 MM   Generic drug:  insulin pen needle                                * B-D U/F 31G X 8 MM   USE 5 PEN NEEDLES PER DAY OR AS DIRECTED   Generic drug:  insulin pen needle                                bisacodyl 10 MG Suppository   Commonly known as:  DULCOLAX   Place 1 suppository (10 mg) rectally daily as needed for constipation                                docusate sodium 100 MG capsule   Commonly known as:  COLACE   Take 1 capsule (100 mg) by mouth 2 times daily as needed for constipation                                ertapenem 1 GM injection   Commonly known as:  INVanz   Inject 0.5 g into the vein every 24 hours                                ferrous sulfate 325 (65 FE) MG tablet   Commonly known as:  IRON   Take 325 mg by mouth daily (with  breakfast)   Last time this was given:  325 mg on 1/23/2018  8:32 AM                                FREESTYLE LITE test strip   USE 1 STRIP TO TEST TWICE DAILY.   Generic drug:  blood glucose monitoring                                furosemide 20 MG tablet   Commonly known as:  LASIX   Take 1 tablet (20 mg) by mouth daily   Last time this was given:  20 mg on 1/23/2018  8:32 AM                                gemfibrozil 600 MG tablet   Commonly known as:  LOPID   TAKE 1 TABLET BY MOUTH TWICE DAILY   Last time this was given:  600 mg on 1/23/2018  8:32 AM                                insulin aspart 100 UNIT/ML injection   Commonly known as:  NovoLOG PEN   Inject 6 units before breakfast, 5 units before lunch, 4 units before dinner, and 4 units at bedtime. Hold for BS sugars.  Before meal correction: 140-175 add 1 unit  176-210 add 2 units  211-245 add 3 units  246-280 add 4 units  281-315 add 5 units 316-350 add 6 units  351-385 add 7 units 386-420 add 8 units  Over 420 add 9 units   Last time this was given:  11 Units on 1/23/2018  1:22 PM                                insulin detemir 100 UNIT/ML injection   Commonly known as:  LEVEMIR FLEXPEN/FLEXTOUCH   Inject 16 Units Subcutaneous every morning (before breakfast)   Last time this was given:  16 Units on 1/23/2018  8:34 AM                                levothyroxine 25 MCG tablet   Commonly known as:  SYNTHROID/LEVOTHROID   TAKE 1 TABLET BY MOUTH EVERY DAY   Last time this was given:  25 mcg on 1/23/2018  8:31 AM                                LISINOPRIL PO   Take 15 mg by mouth daily   Last time this was given:  15 mg on 1/23/2018  8:31 AM                                metoprolol tartrate 25 MG tablet   Commonly known as:  LOPRESSOR   TAKE 1 TABLET BY MOUTH TWICE DAILY   Last time this was given:  25 mg on 1/23/2018  8:32 AM                                omeprazole 20 MG CR capsule   Commonly known as:  priLOSEC   TAKE 1 CAPSULE BY MOUTH EVERY DAY   Last  time this was given:  20 mg on 1/23/2018  8:31 AM                                ondansetron 8 MG tablet   Commonly known as:  ZOFRAN   Take 1 tablet (8 mg) by mouth every 8 hours as needed (Nausea/Vomiting)                                polyethylene glycol Packet   Commonly known as:  MIRALAX/GLYCOLAX   Take 17 g by mouth 2 times daily as needed (constipation)   Last time this was given:  17 g on 1/23/2018  3:00 PM                                prochlorperazine 10 MG tablet   Commonly known as:  COMPAZINE   Take 1 tablet (10 mg) by mouth every 6 hours as needed (Nausea/Vomiting)                                Selenium 200 MCG Tabs   Take 100 mcg by mouth daily. Pt takes one half tab of the 200 mcg daily                                tamsulosin 0.4 MG capsule   Commonly known as:  FLOMAX   TAKE 1 CAPSULE BY MOUTH DAILY   Last time this was given:  0.4 mg on 1/23/2018  8:32 AM                                VITAMIN C PO   Take 1,000 mg by mouth daily                                VITAMIN D3 PO   Take 1,000 Units by mouth daily                                * Notice:  This list has 2 medication(s) that are the same as other medications prescribed for you. Read the directions carefully, and ask your doctor or other care provider to review them with you.

## 2018-01-20 NOTE — ED NOTES
[facility]  ED Nurse Handoff Report    ED Chief complaint: Hypertension (Pt reports htn for the past 1-2 weeks. Pt reports last Thursday he noticed his blood pressure to be high at chemotherapy and reports he has been taking BP at home and his machine read 199/100. Pt reports feeling dizzy as well for 1 week. Pt denies HA, numbness/tingling or chest pain)      ED Diagnosis:   Final diagnoses:   Acute cystitis without hematuria   Atrial tachycardia, paroxysmal (H)   Dizziness       Code Status: hospitalist to address    Allergies: No Known Allergies    Activity level - Baseline/Home:  Stand with Assist    Activity Level - Current:   Stand with Assist     Needed?: No    Isolation: Yes  Infection: Not Applicable  ESBL    Bariatric?: No    Vital Signs:   Vitals:    01/20/18 0230 01/20/18 0300 01/20/18 0315 01/20/18 0330   BP:  182/89 (!) 191/94    Pulse:       Resp: 8  12 10   Temp:       TempSrc:       SpO2: 95% 95% 96% 97%   Weight:       Height:           Cardiac Rhythm: ,        Pain level:      Is this patient confused?: No    Patient Report: Initial Complaint: hypertension- systolic BP at home was 199 pta  Focused Assessment: weakness, dizziness, hypertensive and chest pressure- pt being treated with chemo for multiple myeloma  Tests Performed:   Results for orders placed or performed during the hospital encounter of 01/20/18   XR Chest 2 Views    Narrative    XR CHEST 2 VW  1/20/2018 3:00 AM      HISTORY: Fall, right chest tenderness.      COMPARISON: 1/21/2017.    FINDINGS: The heart is at the upper limits of normal in size without  pulmonary edema. The thoracic aorta is calcified and mildly tortuous.  The lungs are clear. No pneumothorax or pleural effusion. 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      Impression    IMPRESSION: No acute abnormality.   CBC with platelets differential   Result Value Ref Range    WBC 2.3 (L) 4.0 - 11.0 10e9/L    RBC Count 2.22 (L) 4.4 -  5.9 10e12/L    Hemoglobin 8.1 (L) 13.3 - 17.7 g/dL    Hematocrit 24.8 (L) 40.0 - 53.0 %     (H) 78 - 100 fl    MCH 36.5 (H) 26.5 - 33.0 pg    MCHC 32.7 31.5 - 36.5 g/dL    RDW 18.8 (H) 10.0 - 15.0 %    Platelet Count 70 (L) 150 - 450 10e9/L    Diff Method Manual Differential     % Neutrophils 70.0 %    % Lymphocytes 11.0 %    % Monocytes 15.0 %    % Eosinophils 0.0 %    % Basophils 0.0 %    % Metamyelocytes 1.0 %    % Myelocytes 3.0 %    Nucleated RBCs 3 (H) 0 /100    Absolute Neutrophil 1.6 1.6 - 8.3 10e9/L    Absolute Lymphocytes 0.3 (L) 0.8 - 5.3 10e9/L    Absolute Monocytes 0.3 0.0 - 1.3 10e9/L    Absolute Eosinophils 0.0 0.0 - 0.7 10e9/L    Absolute Basophils 0.0 0.0 - 0.2 10e9/L    Absolute Metamyelocytes 0.0 0 10e9/L    Absolute Myelocytes 0.1 (H) 0 10e9/L    Absolute Nucleated RBC 0.1     Anisocytosis Slight     RBC Morphology Consistent with reported results     Platelet Estimate       Automated count confirmed.  Platelet morphology is normal.   INR   Result Value Ref Range    INR 1.09 0.86 - 1.14   Partial thromboplastin time   Result Value Ref Range    PTT 25 22 - 37 sec   Comprehensive metabolic panel   Result Value Ref Range    Sodium 139 133 - 144 mmol/L    Potassium 4.1 3.4 - 5.3 mmol/L    Chloride 107 94 - 109 mmol/L    Carbon Dioxide 22 20 - 32 mmol/L    Anion Gap 10 3 - 14 mmol/L    Glucose 75 70 - 99 mg/dL    Urea Nitrogen 45 (H) 7 - 30 mg/dL    Creatinine 1.45 (H) 0.66 - 1.25 mg/dL    GFR Estimate 46 (L) >60 mL/min/1.7m2    GFR Estimate If Black 55 (L) >60 mL/min/1.7m2    Calcium 9.1 8.5 - 10.1 mg/dL    Bilirubin Total 0.3 0.2 - 1.3 mg/dL    Albumin 2.6 (L) 3.4 - 5.0 g/dL    Protein Total 7.3 6.8 - 8.8 g/dL    Alkaline Phosphatase 74 40 - 150 U/L    ALT 15 0 - 70 U/L    AST 13 0 - 45 U/L   Troponin I   Result Value Ref Range    Troponin I ES 0.016 0.000 - 0.045 ug/L   UA reflex to Microscopic and Culture   Result Value Ref Range    Color Urine Light Yellow     Appearance Urine Clear      Glucose Urine Negative NEG^Negative mg/dL    Bilirubin Urine Negative NEG^Negative    Ketones Urine Negative NEG^Negative mg/dL    Specific Gravity Urine 1.010 1.003 - 1.035    Blood Urine Negative NEG^Negative    pH Urine 5.5 5.0 - 7.0 pH    Protein Albumin Urine 10 (A) NEG^Negative mg/dL    Urobilinogen mg/dL Normal 0.0 - 2.0 mg/dL    Nitrite Urine Positive (A) NEG^Negative    Leukocyte Esterase Urine Large (A) NEG^Negative    Source Midstream Urine     RBC Urine 0 0 - 2 /HPF    WBC Urine 44 (H) 0 - 2 /HPF    Bacteria Urine Few (A) NEG^Negative /HPF    Mucous Urine Present (A) NEG^Negative /LPF    Hyaline Casts 1 0 - 2 /LPF   Magnesium   Result Value Ref Range    Magnesium 2.2 1.6 - 2.3 mg/dL   Phosphorus   Result Value Ref Range    Phosphorus 2.2 (L) 2.5 - 4.5 mg/dL   EKG 12-lead, tracing only   Result Value Ref Range    Interpretation ECG Click View Image link to view waveform and result      Abnormal Results: see above- UTI, neutropenic  Treatments provided: see MAR    Family Comments: family at bedside    OBS brochure/video discussed/provided to patient: N/A    ED Medications:   Medications   meropenem (MERREM) intermittent 1g in NS PREMIX (not administered)   lactated ringers BOLUS 1,000 mL (0 mLs Intravenous Stopped 1/20/18 3431)       Drips infusing?:  Yes      ED NURSE PHONE NUMBER: [phone]

## 2018-01-20 NOTE — IP AVS SNAPSHOT
` `           [place] [address] [facility]: [phone]                 INTERAGENCY TRANSFER FORM - NOTES (H&P, Discharge Summary, Consults, Procedures, Therapies)   2018                    Hospital Admission Date: 2018  JACOB MEDELLIN   : 1926  Sex: Male        Patient PCP Information     Provider PCP Type    Dickson Reich MD General         History & Physicals      H&P signed by Martha Bobby MD at 2018  9:16 PM      Author:  Martha Bobby MD Service:  Hospitalist Author Type:  Physician    Filed:  2018  9:16 PM Date of Service:  2018  5:26 AM Creation Time:  2018 12:06 PM    Status:  Signed :  Martha Bobby MD (Physician)         PRIMARY CARE PHYSICIAN:  iDckson Reich MD      CHIEF COMPLAINT:  Dizziness and weakness.      HISTORY OF PRESENT ILLNESS:  Jacob Medellin is a 89-year-old male accompanied by his son, past medical history notable for multiple myeloma on chemotherapy, myelodysplastic syndrome, prostate cancer status post EBRT and brachytherapy, superficial bladder cancer, hypertension, urinary tract infection notable for ESBL, CKD stage III, A-Fib, no longer on anticoagulation due to lower GI bleed, diabetes mellitus type 2 on insulin, who presents for the above-mentioned complaints.  The patient states over the past several weeks, he has had at least 2 falls due to weakness and dizziness.  He notes falling onto his back but is now complaining of right-sided chest discomfort, as well.  He has been getting chemotherapy, he follows with Dr. Quinn of Oncology and notes that his blood pressures have been running elevated while at his chemo sessions.  His chest pressure has been somewhat increasing and more so overnight.  He notes that his appetite is reduced, and he has not been drinking as much fluids as he should.  He notes dizziness while standing up, as well.  No reported trauma to his head.  Denies shortness of  breath, abdominal pain, nausea, vomiting, diarrhea nor any fevers, productive cough or chills.  He has been compliant with his medications.      PAST MEDICAL HISTORY:   1.  Notable for Vitamin D deficiency   2.  Urinary tract infection with ESBL.   3.  Chronic kidney disease, stage III, and type 2 diabetes mellitus.   4.  Multiple myeloma, currently on chemotherapy.   5.  Myelodysplastic syndrome.   6.  Prostate cancer.   7.  Bladder cancer.   8.  Macrocytic anemia.   9.  IBS.   10.  Iron deficiency anemia.   11.  Hypothyroidism.   12.  Hyperlipidemia.   13.  Hyperglycemia.   14.  Hypertension.   15.  Dysphagia.   16.  Carpal tunnel syndrome.   17.  Congenital hiatal hernia.   18.  Chemo-induced neutropenia.   19.  Atrial fibrillation, no longer on anticoagulation due to lower GI bleed.      MEDICATIONS:[SN1.1]    Prescriptions Prior to Admission   Medication Sig Dispense Refill Last Dose     LISINOPRIL PO Take 15 mg by mouth daily 1/2 of 30 mg        amLODIPine (NORVASC) 5 MG tablet TAKE 1 TABLET BY MOUTH DAILY 90 tablet 3 Taking     furosemide (LASIX) 20 MG tablet Take 1 tablet (20 mg) by mouth daily 90 tablet 1 Taking     LORazepam (ATIVAN) 0.5 MG tablet Take 1 tablet (0.5 mg) by mouth every 4 hours as needed (Anxiety, Nausea/Vomiting or Sleep) 30 tablet 2 Taking     prochlorperazine (COMPAZINE) 10 MG tablet Take 1 tablet (10 mg) by mouth every 6 hours as needed (Nausea/Vomiting) 30 tablet 2 Taking     dexamethasone (DECADRON) 4 MG tablet Take 5 tablets (20 mg) by mouth with food. Days 1, 2, 8, 9, 15, 16, 22, and 23. On carfilzomib days, take prior to carfilzomib dose. 40 tablet 0 1/18/2018     insulin aspart (NOVOLOG PEN) 100 UNIT/ML injection Inject 13 units before breakfast, 11 units before lunch, 9 units before dinner, and 4 units at bedtime.  Before meal correction:  140-175 add 1 unit   176-210 add 2 units   211-245 add 3 units   246-280 add 4 units   281-315 add 5 units  316-350 add 6 units   351-385 add 7  units  386-420 add 8 units   Over 420 add 9 units     12 mL 3 Taking     insulin detemir (LEVEMIR FLEXPEN/FLEXTOUCH) 100 UNIT/ML injection Inject 16 Units Subcutaneous every morning (before breakfast) On Wednesday and Thursday morning, inject 18 units. 15 mL 1 Taking     glipiZIDE (GLUCOTROL) 10 MG tablet Take 1 tablet (10 mg) by mouth daily (with breakfast) 180 tablet 1 Taking     gemfibrozil (LOPID) 600 MG tablet TAKE 1 TABLET BY MOUTH TWICE DAILY 180 tablet 1 Taking     levothyroxine (SYNTHROID/LEVOTHROID) 25 MCG tablet TAKE 1 TABLET BY MOUTH EVERY DAY 90 tablet 0 Taking     tamsulosin (FLOMAX) 0.4 MG capsule TAKE 1 CAPSULE BY MOUTH DAILY 90 capsule 2 Taking     metoprolol (LOPRESSOR) 25 MG tablet TAKE 1 TABLET BY MOUTH TWICE DAILY 180 tablet 1 Taking     omeprazole (PRILOSEC) 20 MG CR capsule TAKE 1 CAPSULE BY MOUTH EVERY DAY 90 capsule 3 Taking     acyclovir (ZOVIRAX) 400 MG tablet Take 1 tablet (400 mg) by mouth 2 times daily Viral Prophylaxis. 60 tablet 3 Taking     ondansetron (ZOFRAN) 8 MG tablet Take 1 tablet (8 mg) by mouth every 8 hours as needed (Nausea/Vomiting) 10 tablet 2 Taking     Ascorbic Acid (VITAMIN C PO) Take 1,000 mg by mouth daily   Taking     Cholecalciferol (VITAMIN D3 PO) Take 1,000 Units by mouth daily   Taking     ferrous sulfate (IRON) 325 (65 FE) MG tablet Take 325 mg by mouth daily (with breakfast)   Taking     Selenium 200 MCG TABS Take 100 mcg by mouth daily. Pt takes one half tab of the 200 mcg daily    Taking     B-D U/F 31G X 8 MM insulin pen needle USE 5 PEN NEEDLES PER DAY OR AS DIRECTED 500 each 1 Taking     FREESTYLE LITE test strip USE 1 STRIP TO TEST TWICE DAILY. 200 strip 1 Taking     B-D U/F 31G X 8 MM insulin pen needle    Taking     polyethylene glycol (MIRALAX/GLYCOLAX) powder    Taking[SN1.2]         ALLERGIES:  No known drug allergies.      SOCIAL HISTORY:  The patient denies tobacco or alcohol use.  Presently lives in [place] in his own house and is  independent, manages his own ADLs and continues to drive.      FAMILY HISTORY:  Heart disease in his father and brother with Paget's disease.      REVIEW OF SYSTEMS:  A 10-point review of systems was performed and negative except as per HPI.      PHYSICAL EXAMINATION:   VITAL SIGNS:  Temperature 97.8 degrees Fahrenheit, heart rate 60s-80s, blood pressure initially 161/85, on recheck 191/94, respirations 10-20, saturating greater than 96% on room air.  Weight is 90.7 kg.   GENERAL:  No acute distress, son is at bedside, appears stated age, comfortably lying flat in the ER bed.   HEENT:  Normocephalic, atraumatic.  Extraocular motions intact, dry mucous membranes, anicteric sclerae, uvula midline.   NECK:  No lymphadenopathy.  Trachea midline.   CARDIOVASCULAR:  Regular rate and rhythm.  No additional heart sounds.   PULMONARY:  Clear to auscultation bilaterally.   ABDOMEN:  Bowel sounds positive.  Soft, nondistended, nontender.   CHEST WALL:  There is tenderness to palpation over the right chest wall, no rashes or bruising noted there.   EXTREMITIES:  Trace pretibial edema, warm extremities.  DP pulses are equal bilaterally.   NEUROLOGIC:  Moves all 4 extremities.  Cranial nerves II-XII grossly intact.   PSYCHIATRIC:  Appropriate mood and affect, oriented x3.   SKIN:  No rashes or bruising noted.      LABORATORY DATA:  CMP shows a creatinine of 1.45, BUN of 45, GFR of 46, phosphorus 2.2, albumin 2.6.  Initial troponin is negative.  Glucose is 75.  CBC shows a white count 2.3, hemoglobin 8.1, platelet count 70,000.  INR is 1.09, PTT 25.  Urinalysis shows 10 of protein, positive nitrites, large leukocyte esterase, 44 WBCs, few bacteria, presence of mucus.  Blood cultures are pending.  Urine culture is pending.  EKG shows sinus rhythm with a new right bundle branch block, QTc of 439, no acute ST-T wave changes indicative of active ischemia.      IMAGING:  Chest x-ray shows no acute findings.      ASSESSMENT:  Roc  Tesha is a 89-year-old male with a complex past medical history as outlined above who presents due to dizziness and chest pressure.  He was found to have a significantly elevated blood pressure in the emergency department as well as paroxysms of likely atrial tachycardia up into the 120s in the ED.  He is admitted for further management of UTI in the setting of immunocompromised state.      PLAN:   1.  Chest pressure secondary to hypertensive urgency versus chest wall strain.  The patient noted to have blood pressures up to 191/94 and possible correlation with chest pressure when his blood pressures are elevated.  He was also found to have likely atrial tachycardia on telemetry up into the 120s.  His initial EKG shows a new right bundle branch block.  Troponin is negative.  He recently had an echocardiogram on 01/11/2018 showing a normal LV function, no wall motion changes, no pericardial effusion, and impaired LV relaxation.  He has been compliant with his antihypertensive medications.  He has reproducible right-sided chest discomfort to palpation, it is possible that with his recent falls he has a chest wall strain.   A.  We will trend troponins.  Place on telemetry.   B.  Resume prior to admission Norvasc and metoprolol, holding prior to admission lisinopril given ROMINA as outlined below.   C.  Hydralazine and labetalol p.r.n., being careful not to reduce blood pressures by greater than 25% in the first 24 hours.     2.  Urinary tract infection.  The patient has a history of ESBL UTIs.  He remains afebrile, however, is immunocompromised with ongoing chemotherapy.   A.  Continue meropenem started in the Emergency Department, follow blood and urine cultures.     3.  Acute kidney injury on chronic kidney disease, stage III.  Baseline creatinine is 0.9 to 1.1.  His admission creatinine was 1.45 with a BUN of 45.  The patient endorses reduced fluid intake over the past couple of weeks.  He was given a 1 liter lactated  ringer's bolus in the Emergency Department.   A.  Continue IV fluids, normal saline at 100 mL per hour.   B.  Monitor I's and O's and daily weights.   C.  Recheck BMP in a.m.     4.  History of multiple myeloma, myelodysplastic syndrome, prostate cancer, and superficial bladder cancer.  The patient follows with Dr. Quinn of Oncology.  He is actively undergoing chemotherapy, most recent session was last week.  He is found to have pancytopenia secondary to his chemotherapy.   A.  Admit to Oncology floor, treatment of urinary tract infection as outlined above.   B.  Neutropenia precautions.   C.  Follow up with Oncology as outpatient for resumption of chemotherapy once infection clears.   D.  PT has been ordered for deconditioning.     5.  History of diabetes mellitus type 2, most recent A1c was 6.3 on 01/08/2018.   A.  Resume prior to admission Levemir 16 units daily in the morning.   B.  Holding prior to admission NovoLog premeal and prior to admission Glucotrol.   C.  Mod carbohydrate diet, blood sugar checks q.i.d.     6.  History of hypertension.  Blood pressures elevated up to 191/94.  The patient states being compliant with his medications.   A.  Management as outlined above.   B.  Given ROMINA, holding prior to admission lisinopril as outlined above.   C.  PTA Norvasc and amlodipine ordered, along with p.r.n. labetalol and IV hydralazine.     7.  History of BPH, resume prior to admission Flomax.     8.  Hypothyroidism, resume prior to admission levothyroxine.     9.  Atrial tachycardia noted in the Emergency Department.  Management as outlined above.   A.  Resume prior to admission beta blocker as noted with p.r.n. labetalol.   B.  May be secondary to his underlying urinary tract infection and immunocompromised state, continue to monitor closely on telemetry.     10.  Deep venous thrombosis prophylaxis with PCDs.      I personally discussed code status with the patient, and he clearly states being a DNR/DNI.          SIMEON BOBBY MD             D: 2018 05:26   T: 2018 12:06   MT: NTS      Name:     JACOB MEDELLIN   MRN:      [id number]        Account:      [id number]   :      1926           Admitted:     [id number]      Document: [id number][SN1.1]         Revision History        User Key Date/Time User Provider Type Action    > SN1.1 2018  9:16 PM Simeon Bobby MD Physician Sign     SN1.2 2018  9:15 PM Simeon Bobby MD Physician      [N/A] 2018 12:06 PM Simeon Bobby MD Physician Edit                  Discharge Summaries     No notes of this type exist for this encounter.         Consult Notes      Consults signed by Pritesh Villaseñor MD at 2018  8:32 AM      Author:  Pritesh Villaseñor MD Service:  Hem/Onc Author Type:  Physician    Filed:  2018  8:32 AM Date of Service:  2018  1:25 PM Creation Time:  2018  2:02 PM    Status:  Signed :  Pritesh Villaseñor MD (Physician)     Consult Orders:    1. Hematology & Oncology IP Consult: Admitted with dizzinessm, neutropenia, suspected UTI following chemo.; Consultant may enter orders: Yes; Patient to be seen: Routine - within 24 hours; Requested Clinic/Group: [facility], follows with [facility]... [[id number]] ordered by Kellee Melo MD at 18 1909                This consult has been requested by Dr. Kellee Melo for multiple myeloma.      Mr. Medellin is a 89-year-old gentleman with multiple myeloma diagnosed in 2016.  Bone marrow biopsy, in 2016 revealed multiple myeloma.  Patient has IgM kappa multiple myeloma.  He is seeing Dr. Quinn in oncology clinic.  He has been on treatment since 2016.  Initially he was getting Velcade and dexamethasone. In 2017, Cytoxan was added.  He is currently on carfilzomib along with dexamethasone since 2018.  Last treatment was on 2018.        The patient came to the ER on 2018 because of elevated blood pressure.  The patient  also has weakness.  About 2-3 weeks ago, he had fallen couple of times at home.  The patient had been monitoring his blood pressure and it was high.  Because of that, he presented to the emergency room on 01/20/2018.      Patient had multiple investigations done in ER on 01/20/2018:    1. WBC of 2.3, hemoglobin of 8.1 and platelet of 70 (on 01/17/2018, WBC of 5.9, hemoglobin of 8.4 and platelets of 92).    2. CMP revealed creatinine of 1.45.  Calcium normal at 9.1.  Low albumin and phosphorus.     3. UA revealed WBC.  Urine culture is positive for lactose fermenting gram-negative rods.  Blood cultures are negative.     4. Chest x-ray did not reveal any acute abnormality.      The patient has been admitted to the hospital because of elevated blood pressure, weakness and UTI.  The patient is currently on IV meropenem.      His CBC has been monitored in the hospital.  He continues to be pancytopenic.  Today his WBC is 3.4, hemoglobin is 8.5, and platelet is 36.      I met with the patient.  He was alone in the room. Patient says that in the last few weeks he has been more weak.  Sometimes he will get a little dizzy and he fell because of that.  When he came to the ER because of elevated blood pressure, he also complained of some chest tightness.      REVIEW OF SYSTEMS:    Denies any headache. No neck pain.  He is not having any chest pain or shortness of breath now.  No abdominal pain, nausea or vomiting.  No bleeding from any site.  No urinary burning or frequency.  No diarrhea.  No fever, chills or night sweats.  Appetite has been fairly good.  All other review of systems negative.      ALLERGIES:  REVIEWED.      MEDICATIONS:  Reviewed.     Past Medical History:    -Multiple myeloma  -Arthritis  -CKD  -Diabetes mellitus  -Hyperlipidemia  -Hypertension  -Hypothyroidism  -A.Fib  -Prostate cancer     Past Surgical History:    -Knee arthroplasty  -Bladder biopsy  -TURP x2  -Left knee surgery  -Phacoemulsification clear  cornea with standard intraocular lens implant  -Skin Melanoma removal     SOCIAL HISTORY:    -He lives by himself.    -No history of smoking.    -No alcohol abuse.        FAMILY HISTORY:   -His mother and father  in their 80s.    -He has 2 brothers and 1 sister.  Sister is turning 100.  His brothers are in their 90s.    -No family history of cancer.       PHYSICAL EXAMINATION:   GENERAL:  He was alert and oriented x 3.   VITAL SIGNS:  Reviewed.   Rest of the systems not examined.      ASSESSMENT:   1. A 89-year-old gentleman with multiple myeloma on chemotherapy admitted with weakness, dizziness, hypertension and urinary tract infection.     2. Pancytopenia secondary to multiple myeloma and chemotherapy.     3. Weakness secondary to age, anemia, multiple myeloma and UTI.     4. Urinary tract infection.     5. Hypertension.     6. History of prostate cancer status post radiation in .      RECOMMENDATIONS:   1. Labs were reviewed with the patient.  I explained to him that his white count, hemoglobin and platelets are low.  This is due to his chemotherapy and myeloma. Complication of pancytopenia discussed.  He is not having any bleeding from thrombocytopenia.  His white count is low, but has not been neutropenic.  At this time, will monitor his CBC.  His CBC should improve.  He always has baseline thrombocytopenia, but his platelets have come around 100.  His white count should go back to normal.  He is always going to remain anemic. Transfusion will be given as needed to keep hemoglobin above 8 and platelets above 20.    2. Patient is on chemotherapy for multiple myeloma.  Because of this severe cytopenia, dose reduction is needed.  I will talk to Dr. Quinn, his primary oncologist, regarding it.     3. Patient has gram negative UTI.  He is on antibiotics.  I explained to the patient that UTI is contributing to his weakness.    4. Patient has weakness.  He has some dizziness.  He fell at home.  I told the  patient that he needs to be very careful.  He needs to hold on to things when he walks around.  He should use a cane or a walker in the house.  He is at high risk of getting bleeding complication as he is thrombocytopenic.     5. We will continue to see him.  Case discussed with Dr. Melo.      Thanks for the consult.      TOTAL TIME SPENT:  60 minutes, more than 50% of time was spent in counseling and coordination of care.         NEW VILLASEÑOR MD             D: 2018 13:25   T: 2018 14:02   MT:       Name:     JACOB MEDELLIN   MRN:      [id number]        Account:       [id number]   :      1926           Consult Date:  2018      Document: [id number][BK1.1]         Revision History        User Key Date/Time User Provider Type Action    > BK1.1 2018  8:32 AM New Villaseñor MD Physician Sign     [N/A] 2018  2:02 PM New Villaseñor MD Physician Edit                     Progress Notes - Physician (Notes from 18 through 18)      Progress Notes by Daksha Valdivia LICSW at 2018  1:47 PM     Author:  Daksha Valdivia LICSW Service:  (none) Author Type:      Filed:  2018  2:26 PM Date of Service:  2018  1:47 PM Creation Time:  2018  1:47 PM    Status:  Signed :  Daksha Valdivia LICSW ()         SW:  D:  Met with son Alfredo who is from [place] and patient.  Reviewed options of discharge home with IV therapy vs coming into the Infusion Center.  Also discussed TCU if patient doesn't feel he has the endurance to returning to living alone and manage his ADL's and IADL's. Reviewed with patient and Alfredo that based on current PT notes he may not qualify for TCU for as long as he will need the IV medication and may be asked to d/c home while till on IV therapy.  In this scenario, writer suggested he could pay privately for transport to bring him in for infusion or son also said he could pay for a nurse to admiinster  the IV medication at home.  Under patient's [employer], he does have home IV coverage and does not need to be homebound.     Patient requested to enter a TCU.  His first preference was [facility] where he was a previous patient.  The facility is not available today.  Discussed other options and he is interested in [facility] and both he and his son are aware of the private room rate $36.00 daily.  Patient was assessed and accepted at [facility] and can accept him after 1530 today.  Son, Alfredo, will transport patient.  Web paged Dr Melo to complete TCU orders.[KH1.1]     Revision History        User Key Date/Time User Provider Type Action    > KH1.1 1/23/2018  2:26 PM Daksha Valdivia [facility]  Sign            Progress Notes by Kimberli Law, RN at 1/23/2018 10:38 AM     Author:  Kimberli Law RN Service:  Care Coordinator Author Type:      Filed:  1/23/2018 10:57 AM Date of Service:  1/23/2018 10:38 AM Creation Time:  1/23/2018 10:38 AM    Status:  Addendum :  Kimberli Law, RN ()         Care Coordinator met with pt concerning discharge planning and need for IV antibiotics for 11 more days.  In discussion with pt, discussed home infusion vs outpt infusion vs TCU.  Pt favors TCU due to living alone and does not feel comfortable doing this himself.  He feels trying to find someone to bring him to the infusion center for 11 days would be a huge burden.  He felt transition to a TCU would most likely be the best as long as it was covered.[HN1.1]  Pt would most likely benefit from continued PT, as PT did an eval yesterday with instruction that pt should ambulate 3-4x per day.  Nsg is noting up with assistance.  He[HN1.2] has been to Health system before after a knee surgery, and prefers this facility due to location.  SW is aware.  He asked that writer call his Son Alfredo ([phone]).  Discussed with Alfredo and he is in  agreement for a TCU.  Alfredo lives in [place] and is presently in town for his father in law's .  He will be in town until Thursday.  Will plan for discharge today if we are able to confirm TCU placement.  Midline cath to be placed.[HN1.1]     Revision History        User Key Date/Time User Provider Type Action    > HN1.2 2018 10:57 AM Kimberli Law RN Case Manager Addend     HN1.1 2018 10:45 AM Kimberli Law RN Case Manager Sign            Progress Notes by Kellee Melo MD at 2018  4:14 PM     Author:  Kellee Melo MD Service:  Internal Medicine Author Type:  Physician    Filed:  2018  4:25 PM Date of Service:  2018  4:14 PM Creation Time:  2018  4:14 PM    Status:  Signed :  Kellee Melo MD (Physician)         [facility]    Hospitalist Progress Note    Date of Service (when I saw patient): 2018     Assessment & Plan      Roc Parkinson is a 89-year-old male with history of UTI with ESBL, CKD, type II DM, multiple myeloma undergoing chemotherapy, IBS, AFib, h/o GI bleed who presented on  due to dizziness and chest pressure.  He was found to have a significantly elevated blood pressure in the emergency department as well as paroxysms of likely atrial tachycardia up into the 120s in the ED.  He is admitted for further management of UTI in the setting of immunocompromised state.     Hypertensive urgency with chest pain, suspect chest wall pain from fall.  The patient noted to have blood pressures up to 191/94 and possible correlation with chest pain when his blood pressures are elevated.  He had brief episode of SVT in the 120s His initial EKG shows a new right bundle branch block.  Troponin are negative.  He recently had an echocardiogram on 2018 showing a normal LV function, no wall motion changes, no pericardial effusion, and impaired LV relaxation.  He has been compliant with his antihypertensive medications.  He has  reproducible right-sided chest discomfort to palpation, and suspect the current chest pain is from recent fall. X-ray negative  - Cotninues on PTA Norvasc, metoprolol lasix.   - Resume lisinopril on[MM1.1] 01/22/18[MM1.2].   - Hydralazine and labetalol PRN for SBP > 180 and > 170 respectively. Otherwise, no additional treatment.  - On 01/22/18, BP better controled with SBP in the 140s.     Brief episodes of self-limiting SVT - Patient states he has a remote history of Afib   - On 01/21/18 tele reviewed, one brief episode of regular SVT at 117 (asymptomatic), o/w NSR.    UTI  Patient noted burning with urination, he has a history of ESBL UTIs.  He remains afebrile, however, is immunocompromised with ongoing chemotherapy.   - On 01/22/18, UCx growing 2 strains of lactose fermenting GNRs, awaiting susceptibilities. BCx remain negative.   - Continue meropenem.       Acute kidney injury, RESOLVED ON 01/21/18, on chronic kidney disease, stage III.  Baseline creatinine is 0.9 to 1.1.  His admission creatinine was 1.45 with a BUN of 45.  The patient endorses reduced fluid intake over the past couple of weeks.        Recent Labs  Lab 01/22/18  0837 01/21/18  0834 01/20/18  0220   CR 1.01 1.02 1.45*     - D/c'd  IVF on 01/21/18    H/o multiple myeloma, myelodysplastic syndrome,   H/o prostate cancer, and superficial bladder cancer.    The patient follows with Dr. Quinn of Oncology.  He is actively undergoing chemotherapy, most recent session was last week.  He is found to have pancytopenia secondary to his chemotherapy, ANC 1.6  - Neutropenia precautions.   - Appreciate oncology consult, plan to reduce dose of future chemo. No growth factor given.   - Continue PTA acyclovir, for herpes infection prophylaxis while on chemotherapy.   - Takes Dexamethasone on Wed and Thursdays. Took last dose last Thursday, 1/18. Continue per PTA schedule if still here on Wed(1/24).   - Transfuse pRBC for hgb < 7, plts, plts < 10,000. Of course,  defer to oncologist, if they recommend differently.[MM1.1]       Recent Labs  Lab 01/22/18  0837 01/21/18  0834 01/20/18  0220 01/17/18  1025   HGB 8.3* 8.5* 8.1* 8.4*   WBC 4.3 3.4* 2.3* 5.9   PLT 40* 36* 70* 92*[MM1.3]       Generalized weakness secondary to above  - PT has been ordered for deconditioning.     DM 2, most recent A1c was 6.3 on 01/08/2018.     Recent Labs  Lab 01/22/18  1259 01/22/18  0837 01/22/18  0827 01/22/18  0215 01/21/18  2118 01/21/18  1632 01/21/18  1250 01/21/18  0834  01/20/18  0220   GLC  --  110*  --   --   --   --   --  183*  --  75   *  --  102* 123* 157* 141* 162*  --   < >  --    < > = values in this interval not displayed.  - Resumed PTA Levemir 16 units daily in the morning, BS okay this PM.   - Resume scheduled meal time insulin at half PTA doses > increase back to PTA doses.   - Holding PTA Glucotrol.   - Mod carb diet with FS QID.     BPH,   - Continue PTA Flomax.     Hypothyroidism, continue PTA levothyroxine.     Deep venous thrombosis prophylaxis with PCDs.       Code Status Discussed at admission, and he clearly stated he would like DNR/DNI.     Dispo: Likely tomorrow once susceptibilities know on UCx. If ESBL, will need midline for prolonged IV antibiotics.     Kellee Melo  [phone] (p)  Text Page (7AM - 6PM)     Interval History   Doing well, no complaints. Awaiting culture results to determine plan.     -Data reviewed today: I reviewed all new labs and imaging results over the last 24 hours. I personally reviewed no images or EKG's today.    Physical Exam   Temp: 98.6  F (37  C) Temp src: Oral BP: 143/83 Pulse: 78 Heart Rate: 100 Resp: 16 SpO2: 94 % O2 Device: None (Room air)    Vitals:    01/20/18 0557 01/21/18 0456 01/22/18 0710   Weight: 93.8 kg (206 lb 14.4 oz) 93.5 kg (206 lb 2.1 oz) 91.4 kg (201 lb 6.4 oz)     Vital Signs with Ranges  Temp:  [98.6  F (37  C)-98.7  F (37.1  C)] 98.6  F (37  C)  Pulse:  [72-78] 78  Heart Rate:  [] 100  Resp:   [16] 16  BP: (143-169)/(60-83) 143/83  SpO2:  [93 %-94 %] 94 %  I/O last 3 completed shifts:  In: 240 [P.O.:240]  Out: 450 [Urine:450]    Constitutional: NAD,   Neuropsyche:  alert and oriented, answers questions appropriately, very pleasant affect.   Respiratory: Breathing comfortably, good air exchange, no wheezes, no crackles.   Cardiovascular: Regular rate and rhythm, no edema.  GI:  soft, NT/ND, BS normal  Skin/Integumen:  No acute rash, bruising or sign of bleeding.   MSK: Pain with palpation over the anterior-lateral chest wall.       Medications   All medications reviewed on 01/22/18      - MEDICATION INSTRUCTIONS -[MM1.1]         lisinopril (PRINIVIL/ZESTRIL) tablet 15 mg  15 mg Oral Daily     insulin aspart  4-6 Units Subcutaneous TID w/meals     acyclovir  400 mg Oral BID     amLODIPine  5 mg Oral Daily     ferrous sulfate  325 mg Oral Daily with breakfast     furosemide  20 mg Oral Daily     gemfibrozil  600 mg Oral BID     insulin detemir  16 Units Subcutaneous QAM AC     levothyroxine  25 mcg Oral Daily     metoprolol tartrate  25 mg Oral BID     omeprazole  20 mg Oral QAM     tamsulosin  0.4 mg Oral Daily     sodium chloride (PF)  3 mL Intracatheter Q8H     insulin aspart  1-10 Units Subcutaneous TID AC     insulin aspart  1-7 Units Subcutaneous At Bedtime     meropenem  1 g Intravenous Q12H[MM1.4]       Data     Recent Labs  Lab 01/22/18  0837 01/21/18  0834 01/20/18  1443 01/20/18  1045 01/20/18  0220   WBC 4.3 3.4*  --   --  2.3*   HGB 8.3* 8.5*  --   --  8.1*   * 110*  --   --  112*   PLT 40* 36*  --   --  70*   INR  --   --   --   --  1.09    140  --   --  139   POTASSIUM 4.1 4.1  --   --  4.1   CHLORIDE 109 108  --   --  107   CO2 23 22  --   --  22   BUN 25 29  --   --  45*   CR 1.01 1.02  --   --  1.45*   ANIONGAP 9 10  --   --  10   MICHELLE 8.9 8.8  --   --  9.1   * 183*  --   --  75   ALBUMIN  --   --   --   --  2.6*   PROTTOTAL  --   --   --   --  7.3   BILITOTAL  --   --    --   --  0.3   ALKPHOS  --   --   --   --  74   ALT  --   --   --   --  15   AST  --   --   --   --  13   TROPI  --   --  0.016 0.020 0.016       No results found for this or any previous visit (from the past 24 hour(s)).[MM1.1]         Revision History        User Key Date/Time User Provider Type Action    > MM1.4 1/22/2018  4:25 PM Kellee Melo MD Physician Sign     MM1.3 1/22/2018  4:16 PM Kellee Melo MD Physician      MM1.2 1/22/2018  4:15 PM Kellee Melo MD Physician      MM1.1 1/22/2018  4:14 PM Kellee Melo MD Physician             Progress Notes by Pritesh Villaseñor MD at 1/22/2018  3:07 PM     Author:  Pritesh Villaseñor MD Service:  Hem/Onc Author Type:  Physician    Filed:  1/22/2018  3:20 PM Date of Service:  1/22/2018  3:07 PM Creation Time:  1/22/2018  3:07 PM    Status:  Signed :  Pritesh Villaseñor MD (Physician)         Mr. Parkinson is a 89-year-old gentleman with multiple myeloma diagnosed in 11/2016. Patient has IgM kappa multiple myeloma. He has been on treatment since 12/2016.  He is currently on carfilzomib along with dexamethasone since 01/02/2018.  Last treatment was on 01/18/2018.         Patient came to the ER on 01/20/2018 because of elevated blood pressure. He also has weakness.  He had multiple investigation done in the ER and found to be pancytopenic.  He also has UTI.  Urine culture is positive for lactose fermenting gram-negative rods. He is currently on IV meropenem.       Overall, he feels better. His weakness is less.  Dizziness is less.       REVIEW OF SYSTEMS:    Denies any headache. No chest pain or shortness of breath.  No abdominal pain, nausea or vomiting.  No fever or chills.        PHYSICAL EXAMINATION:   GENERAL:  He was alert and oriented x 3.   VITAL SIGNS:  Reviewed.   Rest of the systems not examined.       ASSESSMENT:   1. A 89-year-old gentleman with multiple myeloma on chemotherapy has pancytopenia.   2. Pancytopenia secondary to multiple myeloma  and chemotherapy.  Improving.      3. Urinary tract infection.      PLAN:  1.  CBC was reviewed with him.  Explained to him that his WBC is back to normal.  Platelets are also improved.  Anemia is stable.  As side effect of chemotherapy wears off, count should improve.  At this time he doesn't need any transfusion or growth factor.  2. Because of cytopenia, dose of future chemotherapy will be reduced.   3.  He'll be followed in clinic next week with CBC.  We'll sign off.  Please call us with any questions.[BK1.1]     Revision History        User Key Date/Time User Provider Type Action    > BK1.1 1/22/2018  3:20 PM Pritesh Villaseñor MD Physician Sign            Progress Notes by Iman Coto PT at 1/22/2018 11:17 AM     Author:  Iman Coto, PT Service:  Physical Medicine and Rehabilitation Author Type:  Physical Therapist    Filed:  1/22/2018 11:17 AM Date of Service:  1/22/2018 11:17 AM Creation Time:  1/22/2018 11:17 AM    Status:  Signed :  Iman Coto, PT (Physical Therapist)            01/22/18 1000   Quick Adds   Type of Visit Initial PT Evaluation   Living Environment   Lives With alone   Living Arrangements independent living facility   Home Accessibility no concerns   Self-Care   Regular Exercise no   Equipment Currently Used at Home walker, rolling  (tried tub bench but too big, walkin in shower)   Functional Level Prior   Ambulation 1-->assistive equipment   Transferring 1-->assistive equipment   Toileting 0-->independent   Bathing 0-->independent   Dressing 0-->independent   Eating 0-->independent   Communication 0-->understands/communicates without difficulty   Swallowing 0-->swallows foods/liquids without difficulty   Cognition 0 - no cognition issues reported   Fall history within last six months yes   Number of times patient has fallen within last six months 2   Which of the above functional risks had a recent onset or change? none   Prior Functional Level Comment stays in apt tand makes  own meals   General Information   Onset of Illness/Injury or Date of Surgery - Date 01/19/18   Referring Physician Martha Bobby   Patient/Family Goals Statement pt would lik,e to discharge back to his apt   Pertinent History of Current Problem (include personal factors and/or comorbidities that impact the POC) The patient came to the ER on 01/20/2018 because of elevated blood pressure.  The patient also has weakness.  About 2-3 weeks ago, he had fallen couple of times at home.  The patient had been monitoring his blood pressure and it was high.  Because of that, he presented to the emergency room on 01/20/2018.    Precautions/Limitations fall precautions   Weight-Bearing Status - LUE full weight-bearing   Weight-Bearing Status - RUE full weight-bearing   Weight-Bearing Status - LLE full weight-bearing   Weight-Bearing Status - RLE full weight-bearing   Cognitive Status Examination   Orientation orientation to person, place and time   Level of Consciousness alert   Follows Commands and Answers Questions 100% of the time   Personal Safety and Judgment intact   Memory intact   Pain Assessment   Patient Currently in Pain No   Range of Motion (ROM)   ROM Quick Adds No deficits were identified   Strength   Strength Comments strength appears to be at baseline   Bed Mobility   Bed Mobility Comments I bed mob    Transfer Skills   Transfer Comments mod I with sit to stand transfers,    Gait   Gait Comments mod I with gait with FWW, 380'   Clinical Impression   Criteria for Skilled Therapeutic Intervention no;evaluation only;current level of function same as previous level of function   Clinical Presentation Stable/Uncomplicated   Clinical Presentation Rationale clincial judgement   Clinical Decision Making (Complexity) Low complexity   Predicted Duration of Therapy Intervention (days/wks) eval only   Anticipated Discharge Disposition Home   Risk & Benefits of therapy have been explained Yes   Patient, Family & other  "staff in agreement with plan of care Yes   [facility]-PAC TM \"6 Clicks\"   2016, Trustees of [facility], under license to [employer].  All rights reserved.   6 Clicks Short Forms Basic Mobility Inpatient Short Form   [facility] AM-PAC  \"6 Clicks\" V.2 Basic Mobility Inpatient Short Form   1. Turning from your back to your side while in a flat bed without using bedrails? 4 - None   2. Moving from lying on your back to sitting on the side of a flat bed without using bedrails? 4 - None   3. Moving to and from a bed to a chair (including a wheelchair)? 4 - None   4. Standing up from a chair using your arms (e.g., wheelchair, or bedside chair)? 4 - None   5. To walk in hospital room? 4 - None   6. Climbing 3-5 steps with a railing? 4 - None   Basic Mobility Raw Score (Score out of 24.Lower scores equate to lower levels of function) 24   Total Evaluation Time   Total Evaluation Time (Minutes) 30[SF1.1]        Revision History        User Key Date/Time User Provider Type Action    > SF1.1 1/22/2018 11:17 AM Iman Coto, PT Physical Therapist Sign            Progress Notes by Pritesh Villaseñor MD at 1/21/2018  1:22 PM     Author:  Pritesh Villaseñor MD Service:  Hem/Onc Author Type:  Physician    Filed:  1/21/2018  1:24 PM Date of Service:  1/21/2018  1:22 PM Creation Time:  1/21/2018  1:22 PM    Status:  Signed :  Pritesh Villaseñor MD (Physician)         Consult dictated.    89-year-old gentleman with multiple myeloma on chemotherapy admitted with weakness, dizziness, hypertension and UTI.  Labs reveal pancytopenia.  Pancytopenia is secondary to myeloma and chemotherapy.    Plan:  -Monitor CBC.  Transfuse as needed to keep hemoglobin above 8.0 and platelet of 20.  -Continue antibiotics for UTI  -We will plan on dose reduction of chemotherapy with next cycle.[BK1.1]         Revision History        User Key Date/Time User Provider Type Action    > BK1.1 1/21/2018  1:24 PM Pritesh Villaseñor MD " Physician Sign            Progress Notes by Kellee Melo MD at 1/21/2018  8:02 AM     Author:  Kellee Melo MD Service:  Internal Medicine Author Type:  Physician    Filed:  1/21/2018  9:26 AM Date of Service:  1/21/2018  8:02 AM Creation Time:  1/21/2018  8:02 AM    Status:  Addendum :  Kellee Melo MD (Physician)         [facility]    Hospitalist Progress Note    Date of Service (when I saw patient): 01/21/2018     Assessment & Plan      Roc Parkinson is a 89-year-old male with history of UTI with ESBL, CKD, type II DM, multiple myeloma undergoing chemotherapy, IBS, AFib, h/o GI bleed who presented on 1/20 due to dizziness and chest pressure.  He was found to have a significantly elevated blood pressure in the emergency department as well as paroxysms of likely atrial tachycardia up into the 120s in the ED.  He is admitted for further management of UTI in the setting of immunocompromised state.[MM1.1]     H[MM1.2]ypertensive urgency[MM1.1] with chest pain, suspect[MM1.2] chest wall[MM1.1] pain from fall[MM1.2].  The patient noted to have blood pressures up to 191/94 and possible correlation with chest[MM1.1] pain[MM1.2] when his blood pressures are elevated.  He had brief episode of SVT in the 120s His initial EKG shows a new right bundle branch block.  Troponin are negative.  He recently had an echocardiogram on 01/11/2018 showing a normal LV function, no wall motion changes, no pericardial effusion, and impaired LV relaxation.  He has been compliant with his antihypertensive medications.  He has reproducible right-sided chest discomfort to palpation,[MM1.1] and suspect the current chest pain is from[MM1.2] recent fall[MM1.1]. X-ray negative  - Co[MM1.2]tninues on PTA Norvasc, metoprolol lasix. Holding PTA lisinopril given ROMINA as outlined below.   - Hydralazine and labetalol PRN for SBP > 180 and > 170 respectively. Otherwise, no additional treatment.  - On[MM1.1]  01/21/18[MM1.3], BP better controled with SBP in the 150s,[MM1.1]     Brief episodes of self-limiting SVT - Patient states he has a remote history of Afib   - On[MM1.2] 01/21/18[MM1.4] tele reviewed, one brief episode of[MM1.1] regular[MM1.2] SVT at 117[MM1.1] (asymptomatic)[MM1.2], o/w NSR.    UTI[MM1.1]  Patient noted burning with urination, he has a[MM1.2] history of ESBL UTIs.  He remains afebrile, however, is immunocompromised with ongoing chemotherapy.   - On[MM1.1] 01/21/18[MM1.5], UCx growing lactose fermenting GNRs. BCx remain negative.   - Continue meropenem.       Acute kidney injury[MM1.1], RESOLVED ON 01/21/18,[MM1.6] on chronic kidney disease, stage III.  Baseline creatinine is 0.9 to 1.1.  His admission creatinine was 1.45 with a BUN of 45.  The patient endorses reduced fluid intake over the past couple of weeks.[MM1.1]      Recent Labs  Lab 01/21/18  0834 01/20/18  0220   CR 1.02 1.45*[MM1.7]     -[MM1.1] D/c IVF on[MM1.6] 01/21/18[MM1.7]    H/o multiple myeloma, myelodysplastic syndrome,   H/o prostate cancer, and superficial bladder cancer.    The patient follows with Dr. Quinn of Oncology.  He is actively undergoing chemotherapy, most recent session was last week.  He is found to have pancytopenia secondary to his chemotherapy, ANC 1.6  - Neutropenia precautions.   - Consult oncology. They should be aware of this acute decline following chemo.   - Oncology consulted re: neupogen. May have already received neulasta and not febrile or septic.    - Continue PTA acyclovir, for herpes infection prophylaxis while on chemotherapy.   - Takes Dexamethasone on Wed and Thursdays. Took last dose last Thursday, 1/18. Continue per PTA schedule if still here on Wed(1/24).[MM1.1]   - On[MM1.6] 01/21/18[MM1.8], WBC up to 3.4, hgb stable at 8.5, and plts down to 36. No signs of bleeding.   - Transfuse pRBC for hgb < 7, plts, plts < 10,000. Of course, defer to oncologist, if they recommend differently.[MM1.6]      Generalized weakness secondary to above  - PT has been ordered for deconditioning.     DM 2, most recent A1c was 6.3 on 01/08/2018.[MM1.1]     Recent Labs  Lab 01/21/18  0834 01/21/18  0656 01/21/18  0210 01/20/18  2102 01/20/18  1704 01/20/18  1225 01/20/18  0726  01/20/18  0220   *  --   --   --   --   --   --   --  75   BGM  --  102* 118* 161* 126* 100* 85  < >  --    < > = values in this interval not displayed.[MM1.9]  - Resumed PTA Levemir 16 units daily in the morning, BS okay this PM.[MM1.1]   - Resume scheduled meal time insulin at half PTA doses.[MM1.6]    - Holding prior to admission NovoLog premeal and prior to admission Glucotrol.   - Mod carb diet with FS QID.     BPH,   - Continue PTA Flomax.     Hypothyroidism, continue PTA levothyroxine.     Deep venous thrombosis prophylaxis with PCDs.       Code Status Discussed at admission, and he clearly stated he would like DNR/DNI.     Kellee ARNOLDDevin Melo  [phone] (p)  Text Page (7AM - 6PM)     Interval History[MM1.1]   BP better controlled. Patient only notes chest pain when he presses on right side of chest where he landed post a fall. He has not SOB, N/V. No symptoms of brief episode of SVT last night. Burning with urination improved but still present.[MM1.2]     -Data reviewed today: I reviewed all new labs and imaging results over the last 24 hours. I personally reviewed no images or EKG's today.    Physical Exam   Temp: 98.4  F (36.9  C) Temp src: Oral BP: 155/76 Pulse: 72 Heart Rate: 72 Resp: 16 SpO2: 91 % O2 Device: None (Room air)    Vitals:    01/20/18 0118 01/20/18 0557 01/21/18 0456   Weight: 90.7 kg (200 lb) 93.8 kg (206 lb 14.4 oz) 93.5 kg (206 lb 2.1 oz)     Vital Signs with Ranges  Temp:  [98.4  F (36.9  C)-98.9  F (37.2  C)] 98.4  F (36.9  C)  Pulse:  [72] 72  Heart Rate:  [70-84] 72  Resp:  [16] 16  BP: (138-176)/(64-86) 155/76  SpO2:  [90 %-92 %] 91 %  I/O last 3 completed shifts:  In: 2468 [P.O.:480; I.V.:1988]  Out: 4012  [Urine:2525][MM1.1]    Constitutional: NAD,   Neuropsyche:  alert and oriented, answers questions appropriately, very pleasant affect.   Respiratory: Breathing comfortably, good air exchange, no wheezes, no crackles.   Cardiovascular: Regular rate and rhythm, no edema.  GI:  soft, NT/ND, BS normal  Skin/Integumen:  No acute rash, bruising or sign of bleeding.   MSK: Pain with palpation over the anterior-lateral chest wall.[MM1.2]       Medications   All medications reviewed on 01/21/18      - MEDICATION INSTRUCTIONS -       NaCl 100 mL/hr at 01/21/18 0324       acyclovir  400 mg Oral BID     amLODIPine  5 mg Oral Daily     ferrous sulfate  325 mg Oral Daily with breakfast     furosemide  20 mg Oral Daily     gemfibrozil  600 mg Oral BID     insulin detemir  16 Units Subcutaneous QAM AC     levothyroxine  25 mcg Oral Daily     metoprolol tartrate  25 mg Oral BID     omeprazole  20 mg Oral QAM     tamsulosin  0.4 mg Oral Daily     sodium chloride (PF)  3 mL Intracatheter Q8H     insulin aspart  1-10 Units Subcutaneous TID AC     insulin aspart  1-7 Units Subcutaneous At Bedtime     meropenem  1 g Intravenous Q12H       Data[MM1.1]     Recent Labs  Lab 01/21/18  0834 01/20/18  1443 01/20/18  1045 01/20/18  0220 01/17/18  1025   WBC 3.4*  --   --  2.3* 5.9   HGB 8.5*  --   --  8.1* 8.4*   *  --   --  112* 113*   PLT 36*  --   --  70* 92*   INR  --   --   --  1.09  --      --   --  139  --    POTASSIUM 4.1  --   --  4.1  --    CHLORIDE 108  --   --  107  --    CO2 22  --   --  22  --    BUN 29  --   --  45*  --    CR 1.02  --   --  1.45*  --    ANIONGAP 10  --   --  10  --    MICHELLE 8.8  --   --  9.1  --    *  --   --  75  --    ALBUMIN  --   --   --  2.6*  --    PROTTOTAL  --   --   --  7.3  --    BILITOTAL  --   --   --  0.3  --    ALKPHOS  --   --   --  74  --    ALT  --   --   --  15  --    AST  --   --   --  13  --    TROPI  --  0.016 0.020 0.016  --[MM1.6]        No results found for this or any  previous visit (from the past 24 hour(s)).[MM1.1]         Revision History        User Key Date/Time User Provider Type Action    > MM1.9 1/21/2018  9:26 AM Kellee Melo MD Physician Addend     MM1.8 1/21/2018  9:24 AM Kellee Melo MD Physician      MM1.7 1/21/2018  9:23 AM Kellee Melo MD Physician      MM1.6 1/21/2018  9:22 AM Kellee Melo MD Physician      MM1.4 1/21/2018  8:34 AM Kellee Melo MD Physician Sign     MM1.2 1/21/2018  8:28 AM Kellee Melo MD Physician      MM1.3 1/21/2018  8:05 AM Kellee Melo MD Physician      MM1.5 1/21/2018  8:03 AM Kellee Melo MD Physician      MM1.1 1/21/2018  8:02 AM Kellee Melo MD Physician             Progress Notes by Kellee Melo MD at 1/20/2018  6:55 PM     Author:  Kellee Melo MD Service:  Internal Medicine Author Type:  Physician    Filed:  1/20/2018  7:12 PM Date of Service:  1/20/2018  6:55 PM Creation Time:  1/20/2018  6:55 PM    Status:  Signed :  Kellee Melo MD (Physician)         [facility]    Date of Service (CHART CHECK):[MM1.1] 01/20/2018[MM1.2] Admitted by Dr. Bobby this AM.[MM1.1]     Assessment & Plan[MM1.2]      Roc Parkinson is a 89-year-old male with history of UTI with ESBL, CKD, type II DM, multiple myeloma undergoing chemotherapy, IBS, AFib, h/o GI bleed who presented on 1/20 due to dizziness and chest pressure.  He was found to have a significantly elevated blood pressure in the emergency department as well as paroxysms of likely atrial tachycardia up into the 120s in the ED.  He is admitted for further management of UTI in the setting of immunocompromised state.       Chest pressure secondary to hypertensive urgency versus chest wall strain.  The patient noted to have blood pressures up to 191/94 and possible correlation with chest pressure when his blood pressures are elevated.  He was also found to have likely atrial tachycardia on telemetry  up into the 120s.  His initial EKG shows a new right bundle branch block.  Troponin are negative.  He recently had an echocardiogram on 01/11/2018 showing a normal LV function, no wall motion changes, no pericardial effusion, and impaired LV relaxation.  He has been compliant with his antihypertensive medications.  He has reproducible right-sided chest discomfort to palpation, it is possible that with his recent falls he has a chest wall strain.   - Cotninues on PTA Norvasc and metoprolol, holding prior to admission lisinopril given ROMINA as outlined below.   - Hydralazine and labetalol PRN for SBP > 180 and > 170 respectively. Otherwise, no additional treatment.  -   UTI  The patient has a history of ESBL UTIs.  He remains afebrile, however, is immunocompromised with ongoing chemotherapy.   - Continue meropenem started in the Emergency Department, follow blood and urine cultures.     Acute kidney injury on chronic kidney disease, stage III.  Baseline creatinine is 0.9 to 1.1.  His admission creatinine was 1.45 with a BUN of 45.  The patient endorses reduced fluid intake over the past couple of weeks.   - S/p 1 liter lactated ringer's bolus in the Emergency Department.   - Continue IV fluids, normal saline at 100 mL per hour.   - Recheck BMP in a.m.     H/o multiple myeloma, myelodysplastic syndrome,   H/o prostate cancer, and superficial bladder cancer.    The patient follows with Dr. Quinn of Oncology.  He is actively undergoing chemotherapy, most recent session was last week.  He is found to have pancytopenia secondary to his chemotherapy, ANC 1.6  - Neutropenia precautions.   - Consult oncology. They should be aware of this acute decline following chemo.   - Oncology consulted re: neupogen. May have already received neulasta and not febrile or septic.    - Continue PTA acyclovir, for herpes infection prophylaxis while on chemotherapy.   - Takes Dexamethasone on Wed and Thursdays. Took last dose last Thursday, 1/18.  Continue per PTA schedule if still here on Wed(1/24)     Generalized weakness secondary to above  - PT has been ordered for deconditioning.     DM 2, most recent A1c was 6.3 on 01/08/2018.[MM1.1]     Recent Labs  Lab 01/20/18  1704 01/20/18  1225 01/20/18  0726 01/20/18  0434 01/20/18  0220   GLC  --   --   --   --  75   * 100* 85 76  --[MM1.3]      - Resumed PTA Levemir 16 units daily in the morning, BS okay this PM.   - Holding prior to admission NovoLog premeal and prior to admission Glucotrol.   - Mod carb diet with FS QID.     BPH,   - resume prior to admission Flomax.     Hypothyroidism, resume prior to admission levothyroxine.     Short-lived atrial tachycardia noted in the Emergency Department. May be secondary to his underlying urinary tract infection and immunocompromised state,  - Resume prior to admission beta blocker as noted with p.r.n. labetalol.   - Monitored on telemetry     Deep venous thrombosis prophylaxis with PCDs.       Code Status Discussed at admission, and he clearly stated he would like DNR/DNI.[MM1.1]     Kellee Melo[MM1.2]  [phone] (p)  Text Page (7AM - 6PM)[MM1.1]     Interval History[MM1.2]   Hemodynamically stable. BP normalized. Afebrile.      -Data reviewed today: I reviewed all new labs and imaging results over the last 24 hours. I personally reviewed no images or EKG's today.[MM1.1]    Physical Exam   Temp: 98.7  F (37.1  C) Temp src: Oral BP: 138/64 Pulse: 79 Heart Rate: 75 Resp: 16 SpO2: 90 % O2 Device: None (Room air)    Vitals:    01/20/18 0118 01/20/18 0557   Weight: 90.7 kg (200 lb) 93.8 kg (206 lb 14.4 oz)[MM1.2]     Vital Signs with Ranges[MM1.1]  Temp:  [97.8  F (36.6  C)-98.7  F (37.1  C)] 98.7  F (37.1  C)  Pulse:  [79] 79  Heart Rate:  [] 75  Resp:  [8-21] 16  BP: (138-191)/() 138/64  SpO2:  [90 %-97 %] 90 %  I/O last 3 completed shifts:  In: -   Out: 850 [Urine:850]      Medications[MM1.2]   All medications reviewed on[MM1.1]  01/20/18[MM1.4]      - MEDICATION INSTRUCTIONS -       NaCl 100 mL/hr at 01/20/18 1712       acyclovir  400 mg Oral BID     amLODIPine  5 mg Oral Daily     ferrous sulfate  325 mg Oral Daily with breakfast     furosemide  20 mg Oral Daily     gemfibrozil  600 mg Oral BID     insulin detemir  16 Units Subcutaneous QAM AC     levothyroxine  25 mcg Oral Daily     metoprolol tartrate  25 mg Oral BID     omeprazole  20 mg Oral QAM     tamsulosin  0.4 mg Oral Daily     sodium chloride (PF)  3 mL Intracatheter Q8H     insulin aspart  1-10 Units Subcutaneous TID AC     insulin aspart  1-7 Units Subcutaneous At Bedtime     meropenem  1 g Intravenous Q12H       Data     Recent Labs  Lab 01/20/18  1443 01/20/18  1045 01/20/18  0220 01/17/18  1025   WBC  --   --  2.3* 5.9   HGB  --   --  8.1* 8.4*   MCV  --   --  112* 113*   PLT  --   --  70* 92*   INR  --   --  1.09  --    NA  --   --  139  --    POTASSIUM  --   --  4.1  --    CHLORIDE  --   --  107  --    CO2  --   --  22  --    BUN  --   --  45*  --    CR  --   --  1.45*  --    ANIONGAP  --   --  10  --    MICHELLE  --   --  9.1  --    GLC  --   --  75  --    ALBUMIN  --   --  2.6*  --    PROTTOTAL  --   --  7.3  --    BILITOTAL  --   --  0.3  --    ALKPHOS  --   --  74  --    ALT  --   --  15  --    AST  --   --  13  --    TROPI 0.016 0.020 0.016  --        Recent Results (from the past 24 hour(s))   XR Chest 2 Views    Narrative    XR CHEST 2 VW  1/20/2018 3:00 AM      HISTORY: Fall, right chest tenderness.      COMPARISON: 1/21/2017.    FINDINGS: The heart is at the upper limits of normal in size without  pulmonary edema. The thoracic aorta is calcified and mildly tortuous.  The lungs are clear. No pneumothorax or pleural effusion. 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      Impression    IMPRESSION: No acute abnormality.    GEORGINA TITUS MD[MM1.2]            Revision History        User Key Date/Time User Provider Type Action    > MM1.4 1/20/2018  7:12 PM  Kellee Melo MD Physician Sign     MM1.3 1/20/2018  7:04 PM Kellee Melo MD Physician      MM1.2 1/20/2018  6:56 PM Kellee Melo MD Physician      MM1.1 1/20/2018  6:55 PM Kellee Melo MD Physician             ED Provider Notes by Parminder Pierce MD at 1/20/2018  1:14 AM     Author:  Parminder Pierce MD Service:  Emergency Medicine Author Type:  Physician    Filed:  1/20/2018  9:28 AM Date of Service:  1/20/2018  1:14 AM Creation Time:  1/20/2018  1:55 AM    Status:  Signed :  Parminder Pierce MD (Physician)           History     Chief Complaint:  Hypertension    HPI   Roc Parkinson is a 89 year old male who presents with[BW1.1] hypertension. The patient states that he has had 2 recent falls where he fell onto his buttocks. Since 1/18 the patient noticed his blood pressures have been high when at his chemotherapy appointments. Over this time, he has also had some increasing dizziness that has been constant as well as some slight chest discomfort. He states that his chest discomfort is present, mostly at night, and that his dizziness is present when standing up. All of these symptoms worsened early this morning with blood pressure reportedly at 199/100. He does not recall any trauma to his head or chest to cause these symptoms. He denies any[BW1.2] significant dyspnea[RD1.1], headaches, abdominal pains, coughs, fevers, head trauma, urinary symptoms, bowel complications, or any other symptoms. Of note, his last chemotherapy appointment was on 1/18.[BW1.2]    Allergies:  No known drug allergies.    Medications:    lisinopril (PRINIVIL,ZESTRIL) 30 MG tablet  amLODIPine (NORVASC) 5 MG tablet  furosemide (LASIX) 20 MG tablet  LORazepam (ATIVAN) 0.5 MG tablet  prochlorperazine (COMPAZINE) 10 MG tablet  dexamethasone (DECADRON) 4 MG tablet  glipiZIDE (GLUCOTROL) 10 MG tablet  gemfibrozil (LOPID) 600 MG tablet  levothyroxine (SYNTHROID/LEVOTHROID) 25 MCG tablet  tamsulosin (FLOMAX) 0.4 MG  capsule  metoprolol (LOPRESSOR) 25 MG tablet  omeprazole (PRILOSEC) 20 MG CR capsule  polyethylene glycol (MIRALAX/GLYCOLAX) powder  acyclovir (ZOVIRAX) 400 MG tablet  ondansetron (ZOFRAN) 8 MG tablet  Ascorbic Acid (VITAMIN C PO)  Cholecalciferol (VITAMIN D3 PO)  ferrous sulfate (IRON) 325 (65 FE) MG tablet  Selenium 200 MCG TABS    Past Medical History:    Arthritis  Blood transfusion  Diabetes mellitus  Heart disease  Hyperlipidemia  Hypertension  Hypothyroidism  Malignant neoplasm    Past Surgical History:    Knee arthroplasty  Bladder biopsy  TURP x2  GI surgery and fistula  Left knee surgery  Phacoemulsification clear cornea with standard intraocular lens implant  Melanoma removal    Family History:    Father-heart arrhythmia   Brother-Paget's disease    Social History:  Smoking status: Never smoker  Alcohol use: No  Marital Status:         Review of Systems   Constitutional: Negative for[BW1.1] chills[BW1.2],[BW1.1] fatigue[BW1.2] and[BW1.1] fever[BW1.2].   Eyes: Positive for[BW1.1] pain[BW1.2]. Negative for[BW1.1] photophobia[BW1.2] and[BW1.1] visual disturbance[BW1.2].   Respiratory: Positive for[BW1.1] chest tightness[BW1.2]. Negative for[BW1.1] cough[BW1.2].    Cardiovascular: Positive for[BW1.1] chest pain[BW1.2]. Negative for[BW1.1] palpitations[BW1.2] and[BW1.1] leg swelling[BW1.2].   Gastrointestinal: Negative for[BW1.1] abdominal pain[BW1.2],[BW1.1] diarrhea[BW1.2],[BW1.1] nausea[BW1.2] and[BW1.1] vomiting[BW1.2].   Genitourinary: Positive for[BW1.1] decreased urine volume[BW1.2]. Negative for[BW1.1] difficulty urinating[BW1.2],[BW1.1] dysuria[BW1.2],[BW1.1] hematuria[BW1.2] and[BW1.1] urgency[BW1.2].   Musculoskeletal: Negative for[BW1.1] back pain[BW1.2].   Skin: Negative for[BW1.1] color change[BW1.2] and[BW1.1] wound[BW1.2].   Allergic/Immunologic: Positive for[BW1.1] immunocompromised state[BW1.2].   Neurological: Positive for[BW1.1] dizziness[BW1.2]. Negative for[BW1.1]  "syncope[BW1.2],[BW1.1] weakness[BW1.2],[BW1.1] light-headedness[BW1.2] and[BW1.1] headaches[BW1.2].[BW1.1]   All other systems reviewed and are negative[BW1.2].    Physical Exam[BW1.1]     Patient Vitals for the past 24 hrs:   BP Temp Temp src Pulse Heart Rate Resp SpO2 Height Weight   01/20/18 0557 (!) 184/104 97.9  F (36.6  C) Oral - 71 15 92 % - 93.8 kg (206 lb 14.4 oz)   01/20/18 0503 - - - - 72 13 94 % - -   01/20/18 0330 - - - - 66 10 97 % - -   01/20/18 0315 (!) 191/94 - - - 73 12 96 % - -   01/20/18 0300 182/89 - - - - - 95 % - -   01/20/18 0230 - - - - 70 8 95 % - -   01/20/18 0227 175/89 - - - 68 17 95 % - -   01/20/18 0206 - - - - 70 11 94 % - -   01/20/18 0136 - - - - 74 13 95 % - -   01/20/18 0129 176/89 - - - 125 21 94 % - -   01/20/18 0118 161/85 97.8  F (36.6  C) Oral 79 79 20 96 % 1.753 m (5' 9\") 90.7 kg (200 lb)[BW1.3]       Physical Exam[BW1.1]  General: Well appearing, nontoxic. Resting comfortably  Head:  Scalp, face, and head appear normal, atraumatic  Eyes:  Pupils are equal, round, and reactive to light    Conjunctivae non-injected and sclerae white  ENT:    The external nose is normal    Pinnae are normal    The oropharynx is normal, mucous membranes moist    Uvula is in the midline  Neck:  Normal range of motion. Cervical spine nontender, no stepoffs    There is no rigidity noted    Trachea is in the midline  CV:  Regular rate and rhythm     Normal S1/S2, no S3/S4    No murmur or rub  Resp:  Lungs are clear and equal bilaterally    There is no tachypnea    No increased work of breathing    No rales, wheezing, or rhonchi  GI:  Abdomen is soft, no rigidity or guarding    No distension, or mass    No tenderness or rebound tenderness   MS:  Normal muscular tone. Mild TTP over right lateral chest wall, no deformity or crepitus.    No deformity or bony tenderness to the extremities. Full ROM.    Symmetric motor strength    No lower extremity edema  Skin:  No rash or acute skin lesions " noted  Neuro: A&Ox3, GCS 15    CN II - XII intact    Speech clear, fluent, and normal    Strength 5/5 and symmetric in bilateral upper and lower extremities.    No pronator drift. SILT throughout    no ankle clonus    FTN testing normal. No tremor.   Psych: Normal affect.  Appropriate interactions.[RD1.1]      Emergency Department Course   ECG:[BW1.1]  @ 0202  Indication: hypertension   Vent. Rate 67 bpm. AL interval 178 ms. QRS duration 134 ms. QT/QTc 416/439 ms. P-R-T axis 25 17 12.   Normal sinus rhythm. Right bundle branch block. Abnormal ECG.   Read @ 0204 by Dr. Pierce.[BW1.2]    Imaging:[BW1.1]  XR Chest 2 views  IMPRESSION: No acute abnormality.  Report per radiology.[BW1.4]     Laboratory:[BW1.1]  CBC:  WBC 2.3 (L), HGB 8.1 (L), PLT 70 (L),  CMP: BUN 45 (H), Creatinine 1.45 (H), GFR 46 (L), Albumin 2.6 (L), otherwise WNL   INR: 1.09  PTT: 25  (0220) Troponin I: <0.015  (0220) Magnesium: 2.2  (0220) Phosphorous: 2.2 (L)[BW1.4]  (0434) Glucose by meter: 76[BW1.5]   Blood culture #1: pending  Blood culture #2: pending    UA: Clear Light yellow urine, Albumin 10, Nitrite positive, Leukocyte esterase Large, WBC 44 (H), Bacteria few, Mucous present, otherwise WNL  Urine Culture: pending[BW1.4]    Interventions:[BW1.1]  (0219) Lactated ringers BOLUS 1,000 mL, IV[BW1.4]  (0412) Meropenem, intermittent 1 g, IV influsion[BW1.6]    Emergency Department Course:  Nursing notes and vitals reviewed.  ([BW1.1]0207[BW1.2]) I performed an exam of the patient as documented above.[BW1.1]    EKG was done, interpretation as above.  Blood was drawn from the patient. This was sent for laboratory testing, findings above.   The patient was sent for a x-ray while in the emergency department, findings above.[BW1.2]     Findings and plan explained to the patient who consents to admission.   ([BW1.4]0427[BW1.7]) I discussed the patient with [BW1.4] Kanu[BW1.8] of the hospitalist service, who will admit the patient to a[BW1.4] in  patient[BW1.8] bed for further monitoring, evaluation, and treatment.[BW1.4]    Impression & Plan    Medical Decision Making:[BW1.1]  Roc Parkinson is a 89 year old male[BW1.6] with a history of multiple myeloma, actively receiving chemotherapy, who presents for subacute dizziness, intermittent episodes of palpitations, and generalized malaise as noted above.[BW1.9]  H[RD1.1]e was chiefly concerned that he noted his blood pressure was elevated at home tonight which is unusual for him he had increased dizziness as well which prompted his arrival in the ED.  Differential diagnosis includes dysrhythmia, acute coronary syndrome, pulmonary embolism, infectious etiologies including pneumonia, urinary tract infection, as well as electrolyte disturbances, hypoglycemia was also considered.  Broad workup was initiated and was remarkable for pancytopenia although his absolute neutrophil count was 1600, BUN and creatinine are elevated above baseline, consistent with acute renal insufficiency.  Electrolytes otherwise normal.  Initial troponin was negative, blood glucose was 75, urinalysis was positive for pyuria and nitrates consistent with urinary tract infection, upon review of his urinary culture data he has had multiple previous UTI's with ESBL resistant bacteria.  Therefore he will be covered with meropenem.  Cultures were drawn and he has no evidence of severe sepsis or septic shock at this time.  In addition, patient was noted to have multiple episodes of paroxysmal atrial tachycardia with a narrow complex that was associated with dizziness and palpitations.  EKG was obtained during a period of normal sinus rhythm with showed no acute ischemia.  Due to the brief nature of these episodes of tachycardia, I was unable to capture one on EKG.  However, it did appear to be regular.  Differential diagnosis for this would be atrial flutter, atrial fibrillation, sinus tachycardia, or other supraventricular tachycardia.   Patient did not have any hemodynamic instability with this.  Given the above-noted problems, patient will be admitted to the hospital for evaluation and treatment of his symptoms.  Patient remained vitally stable during his emergency department course.[BW1.9]    Diagnosis:[BW1.1]    ICD-10-CM   1. Acute cystitis without hematuria N30.00   2. Atrial tachycardia, paroxysmal (H) I47.1   3. Dizziness R42[BW1.10]       Disposition:[BW1.1]  Patient is admitted to a[BW1.4] inpatient[BW1.8] bed under the care of [BW1.4] Kanu[BW1.8].[BW1.4]            IKrishna, am serving as a scribe on 1/20/2018 at 2:07 AM to personally document services performed by Dr. Pierce based on my observations and the provider's statements to me.[BW1.2]         Krishna Bartlett  1/20/2018    EMERGENCY DEPARTMENT[BW1.1]       Praminder Pierce MD  01/20/18 0928  [RD1.2]     Revision History        User Key Date/Time User Provider Type Action    > RD1.2 1/20/2018  9:28 AM Parminder Pierce MD Physician Sign     RD1.1 1/20/2018  9:22 AM Parminder Pierce MD Physician      BW1.9 1/20/2018  6:36 AM Hull, Paron Scribe Share     BW1.3 1/20/2018  6:30 AM Hull, Krishna Scribe Share     BW1.6 1/20/2018  6:27 AM Tri, Paron Scribe      BW1.5 1/20/2018  5:23 AM Hull, Krishna Scribe Share     BW1.8 1/20/2018  5:09 AM Tri, Krishna Scribe Share     BW1.10 1/20/2018  4:28 AM Hull, Paron Scribe Share     BW1.7 1/20/2018  4:27 AM Tri, Paron Scribe Share     BW1.4 1/20/2018  4:19 AM Tri, Krishna Scribe Share     BW1.2 1/20/2018  2:18 AM Tri, Krishna Scribe Share     BW1.1 1/20/2018  1:58 AM Krishna Bartlett            ED Notes by Ava Quinones, RN at 1/20/2018  4:40 AM     Author:  Ava Quinones RN Service:  (none) Author Type:  Registered Nurse    Filed:  1/20/2018  4:43 AM Date of Service:  1/20/2018  4:40 AM Creation Time:  1/20/2018  4:40 AM    Status:  Signed :  Ava Quinones, RN  (Registered Nurse)         [facility]  ED Nurse Handoff Report    ED Chief complaint: Hypertension (Pt reports htn for the past 1-2 weeks. Pt reports last Thursday he noticed his blood pressure to be high at chemotherapy and reports he has been taking BP at home and his machine read 199/100. Pt reports feeling dizzy as well for 1 week. Pt denies HA, numbness/tingling or chest pain)      ED Diagnosis:   Final diagnoses:   Acute cystitis without hematuria   Atrial tachycardia, paroxysmal (H)   Dizziness       Code Status: hospitalist to address    Allergies: No Known Allergies    Activity level - Baseline/Home:  Stand with Assist    Activity Level - Current:   Stand with Assist     Needed?: No    Isolation: Yes  Infection: Not Applicable  ESBL    Bariatric?: No    Vital Signs:   Vitals:    01/20/18 0230 01/20/18 0300 01/20/18 0315 01/20/18 0330   BP:  182/89 (!) 191/94    Pulse:       Resp: 8  12 10   Temp:       TempSrc:       SpO2: 95% 95% 96% 97%   Weight:       Height:           Cardiac Rhythm: ,        Pain level:      Is this patient confused?: No    Patient Report: Initial Complaint: hypertension- systolic BP at home was 199 pta  Focused Assessment: weakness, dizziness, hypertensive and chest pressure- pt being treated with chemo for multiple myeloma  Tests Performed:[AC1.1]   Results for orders placed or performed during the hospital encounter of 01/20/18   XR Chest 2 Views    Narrative    XR CHEST 2 VW  1/20/2018 3:00 AM      HISTORY: Fall, right chest tenderness.      COMPARISON: 1/21/2017.    FINDINGS: The heart is at the upper limits of normal in size without  pulmonary edema. The thoracic aorta is calcified and mildly tortuous.  The lungs are clear. No pneumothorax or pleural effusion. 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      Impression    IMPRESSION: No acute abnormality.   CBC with platelets differential   Result Value Ref Range    WBC 2.3 (L) 4.0 - 11.0  10e9/L    RBC Count 2.22 (L) 4.4 - 5.9 10e12/L    Hemoglobin 8.1 (L) 13.3 - 17.7 g/dL    Hematocrit 24.8 (L) 40.0 - 53.0 %     (H) 78 - 100 fl    MCH 36.5 (H) 26.5 - 33.0 pg    MCHC 32.7 31.5 - 36.5 g/dL    RDW 18.8 (H) 10.0 - 15.0 %    Platelet Count 70 (L) 150 - 450 10e9/L    Diff Method Manual Differential     % Neutrophils 70.0 %    % Lymphocytes 11.0 %    % Monocytes 15.0 %    % Eosinophils 0.0 %    % Basophils 0.0 %    % Metamyelocytes 1.0 %    % Myelocytes 3.0 %    Nucleated RBCs 3 (H) 0 /100    Absolute Neutrophil 1.6 1.6 - 8.3 10e9/L    Absolute Lymphocytes 0.3 (L) 0.8 - 5.3 10e9/L    Absolute Monocytes 0.3 0.0 - 1.3 10e9/L    Absolute Eosinophils 0.0 0.0 - 0.7 10e9/L    Absolute Basophils 0.0 0.0 - 0.2 10e9/L    Absolute Metamyelocytes 0.0 0 10e9/L    Absolute Myelocytes 0.1 (H) 0 10e9/L    Absolute Nucleated RBC 0.1     Anisocytosis Slight     RBC Morphology Consistent with reported results     Platelet Estimate       Automated count confirmed.  Platelet morphology is normal.   INR   Result Value Ref Range    INR 1.09 0.86 - 1.14   Partial thromboplastin time   Result Value Ref Range    PTT 25 22 - 37 sec   Comprehensive metabolic panel   Result Value Ref Range    Sodium 139 133 - 144 mmol/L    Potassium 4.1 3.4 - 5.3 mmol/L    Chloride 107 94 - 109 mmol/L    Carbon Dioxide 22 20 - 32 mmol/L    Anion Gap 10 3 - 14 mmol/L    Glucose 75 70 - 99 mg/dL    Urea Nitrogen 45 (H) 7 - 30 mg/dL    Creatinine 1.45 (H) 0.66 - 1.25 mg/dL    GFR Estimate 46 (L) >60 mL/min/1.7m2    GFR Estimate If Black 55 (L) >60 mL/min/1.7m2    Calcium 9.1 8.5 - 10.1 mg/dL    Bilirubin Total 0.3 0.2 - 1.3 mg/dL    Albumin 2.6 (L) 3.4 - 5.0 g/dL    Protein Total 7.3 6.8 - 8.8 g/dL    Alkaline Phosphatase 74 40 - 150 U/L    ALT 15 0 - 70 U/L    AST 13 0 - 45 U/L   Troponin I   Result Value Ref Range    Troponin I ES 0.016 0.000 - 0.045 ug/L   UA reflex to Microscopic and Culture   Result Value Ref Range    Color Urine Light  Yellow     Appearance Urine Clear     Glucose Urine Negative NEG^Negative mg/dL    Bilirubin Urine Negative NEG^Negative    Ketones Urine Negative NEG^Negative mg/dL    Specific Gravity Urine 1.010 1.003 - 1.035    Blood Urine Negative NEG^Negative    pH Urine 5.5 5.0 - 7.0 pH    Protein Albumin Urine 10 (A) NEG^Negative mg/dL    Urobilinogen mg/dL Normal 0.0 - 2.0 mg/dL    Nitrite Urine Positive (A) NEG^Negative    Leukocyte Esterase Urine Large (A) NEG^Negative    Source Midstream Urine     RBC Urine 0 0 - 2 /HPF    WBC Urine 44 (H) 0 - 2 /HPF    Bacteria Urine Few (A) NEG^Negative /HPF    Mucous Urine Present (A) NEG^Negative /LPF    Hyaline Casts 1 0 - 2 /LPF   Magnesium   Result Value Ref Range    Magnesium 2.2 1.6 - 2.3 mg/dL   Phosphorus   Result Value Ref Range    Phosphorus 2.2 (L) 2.5 - 4.5 mg/dL   EKG 12-lead, tracing only   Result Value Ref Range    Interpretation ECG Click View Image link to view waveform and result[AC1.2]      Abnormal Results: see above- UTI, neutropenic  Treatments provided: see MAR    Family Comments: family at bedside    OBS brochure/video discussed/provided to patient: N/A    ED Medications:   Medications   meropenem (MERREM) intermittent 1g in NS PREMIX (not administered)   lactated ringers BOLUS 1,000 mL (0 mLs Intravenous Stopped 1/20/18 0331)       Drips infusing?:  Yes      ED NURSE PHONE NUMBER: [phone][AC1.1]          Revision History        User Key Date/Time User Provider Type Action    > AC1.2 1/20/2018  4:43 AM Ava Quinones, RN Registered Nurse Sign     AC1.1 1/20/2018  4:40 AM Ava Quinones, RN Registered Nurse             ED Notes by Leslye Tim at 1/20/2018  3:19 AM     Author:  Leslye Tim Service:  (none) Author Type:  Technician    Filed:  1/20/2018  3:21 AM Date of Service:  1/20/2018  3:19 AM Creation Time:  1/20/2018  3:19 AM    Status:  Signed :  Leslye Tim (Technician)         Pt's EKG was not able to be obtained due to pt  "need tech to physically stand and capture his \"episodes\" however due to staffing waiting for this is not an option. ED MD was made aware of this. Monitor paper was replaced to see if we can capture this on monitor strip.[KH1.1]     Revision History        User Key Date/Time User Provider Type Action    > KH1.1 1/20/2018  3:21 AM Leslye Tim Technician Sign            ED Notes by Ava Quinones RN at 1/20/2018  2:09 AM     Author:  Ava Quinones RN Service:  (none) Author Type:  Registered Nurse    Filed:  1/20/2018  2:11 AM Date of Service:  1/20/2018  2:09 AM Creation Time:  1/20/2018  2:11 AM    Status:  Signed :  Ava Quinones RN (Registered Nurse)         Pt brought to ED with family with reports of HTN . Pt states that his systolic BP was 199 tonight pta. Pt reports HTN and chest pressure/pain that began about a week ago. Pt also reports dizziness and weakness. Pt states that he recently has had some falls.  Pt currently being treated with chemotherapy for multiple myeloma. Respirations even and unlabored. Call light in reach, will continue to monitor.[AC1.1]      Revision History        User Key Date/Time User Provider Type Action    > AC1.1 1/20/2018  2:11 AM Ava Quinones RN Registered Nurse Sign            ED Notes by Ava Quinones RN at 1/20/2018  2:08 AM     Author:  Ava Quinones RN Service:  (none) Author Type:  Registered Nurse    Filed:  1/20/2018  2:08 AM Date of Service:  1/20/2018  2:08 AM Creation Time:  1/20/2018  2:08 AM    Status:  Signed :  Ava Quinones RN (Registered Nurse)         MD at bedside.[AC1.1]     Revision History        User Key Date/Time User Provider Type Action    > AC1.1 1/20/2018  2:08 AM Ava Quinones RN Registered Nurse Sign            ED Notes by Emy Duque RN at 1/20/2018  1:20 AM     Author:  Emy Duque RN Service:  (none) Author Type:  Registered Nurse    Filed:  1/20/2018  1:20 AM Date of Service:  1/20/2018  1:20 AM Creation " Time:  1/20/2018  1:20 AM    Status:  Signed :  Emy Duque RN (Registered Nurse)         Bed: ED25  Expected date:   Expected time:   Means of arrival:   Comments:  triage     Revision History        User Key Date/Time User Provider Type Action    > AH1.1 1/20/2018  1:20 AM Emy Duque, RN Registered Nurse Sign                  Procedure Notes     No notes of this type exist for this encounter.         Progress Notes - Therapies (Notes from 01/20/18 through 01/23/18)      Progress Notes by Iman Coto, PT at 1/22/2018 11:17 AM     Author:  Iman Coto, PT Service:  Physical Medicine and Rehabilitation Author Type:  Physical Therapist    Filed:  1/22/2018 11:17 AM Date of Service:  1/22/2018 11:17 AM Creation Time:  1/22/2018 11:17 AM    Status:  Signed :  Iman Coto PT (Physical Therapist)            01/22/18 1000   Quick Adds   Type of Visit Initial PT Evaluation   Living Environment   Lives With alone   Living Arrangements independent living facility   Home Accessibility no concerns   Self-Care   Regular Exercise no   Equipment Currently Used at Home walker, rolling  (tried tub bench but too big, walkin in shower)   Functional Level Prior   Ambulation 1-->assistive equipment   Transferring 1-->assistive equipment   Toileting 0-->independent   Bathing 0-->independent   Dressing 0-->independent   Eating 0-->independent   Communication 0-->understands/communicates without difficulty   Swallowing 0-->swallows foods/liquids without difficulty   Cognition 0 - no cognition issues reported   Fall history within last six months yes   Number of times patient has fallen within last six months 2   Which of the above functional risks had a recent onset or change? none   Prior Functional Level Comment stays in apt tand makes own meals   General Information   Onset of Illness/Injury or Date of Surgery - Date 01/19/18   Referring Physician Martha Bobby   Patient/Family Goals Statement pt would  "lik,e to discharge back to his apt   Pertinent History of Current Problem (include personal factors and/or comorbidities that impact the POC) The patient came to the ER on 01/20/2018 because of elevated blood pressure.  The patient also has weakness.  About 2-3 weeks ago, he had fallen couple of times at home.  The patient had been monitoring his blood pressure and it was high.  Because of that, he presented to the emergency room on 01/20/2018.    Precautions/Limitations fall precautions   Weight-Bearing Status - LUE full weight-bearing   Weight-Bearing Status - RUE full weight-bearing   Weight-Bearing Status - LLE full weight-bearing   Weight-Bearing Status - RLE full weight-bearing   Cognitive Status Examination   Orientation orientation to person, place and time   Level of Consciousness alert   Follows Commands and Answers Questions 100% of the time   Personal Safety and Judgment intact   Memory intact   Pain Assessment   Patient Currently in Pain No   Range of Motion (ROM)   ROM Quick Adds No deficits were identified   Strength   Strength Comments strength appears to be at baseline   Bed Mobility   Bed Mobility Comments I bed mob    Transfer Skills   Transfer Comments mod I with sit to stand transfers,    Gait   Gait Comments mod I with gait with FWW, 380'   Clinical Impression   Criteria for Skilled Therapeutic Intervention no;evaluation only;current level of function same as previous level of function   Clinical Presentation Stable/Uncomplicated   Clinical Presentation Rationale clincial judgement   Clinical Decision Making (Complexity) Low complexity   Predicted Duration of Therapy Intervention (days/wks) eval only   Anticipated Discharge Disposition Home   Risk & Benefits of therapy have been explained Yes   Patient, Family & other staff in agreement with plan of care Yes   [facility] AM-PAC TM \"6 Clicks\"   2016, Trustees of [facility], under license to [employer].  All rights reserved.   6 " "Clicks Short Forms Basic Mobility Inpatient Short Form   [facility] AM-PAC  \"6 Clicks\" V.2 Basic Mobility Inpatient Short Form   1. Turning from your back to your side while in a flat bed without using bedrails? 4 - None   2. Moving from lying on your back to sitting on the side of a flat bed without using bedrails? 4 - None   3. Moving to and from a bed to a chair (including a wheelchair)? 4 - None   4. Standing up from a chair using your arms (e.g., wheelchair, or bedside chair)? 4 - None   5. To walk in hospital room? 4 - None   6. Climbing 3-5 steps with a railing? 4 - None   Basic Mobility Raw Score (Score out of 24.Lower scores equate to lower levels of function) 24   Total Evaluation Time   Total Evaluation Time (Minutes) 30[SF1.1]        Revision History        User Key Date/Time User Provider Type Action    > SF1.1 1/22/2018 11:17 AM Iman Coto, PT Physical Therapist Sign            "

## 2018-01-21 NOTE — PLAN OF CARE
Problem: Patient Care Overview  Goal: Plan of Care/Patient Progress Review  Outcome: No Change  [id number] A&OX4, VSS on RA. BP high in the morning, most recent was 138/64. Denies SOB, chest pain, n &V. C/o light headednes at times.Contact and protective isolation started. Up with AX1 with gaitbelt. Using urinal at bedside. Dribbling and incontinent at times. IVF at 100mL/hr and on IV abx. Trop level normal so far with the most recent at 0.016. Last phosphorus was mildly reduced at 2.2, encouraged milk as recommended by MD. Recheck in the AM. On mod carb diet. BS were 100 and 126. Oncology consult started today. Continue to monitor.

## 2018-01-21 NOTE — PROGRESS NOTES
[facility]    Hospitalist Progress Note    Date of Service (CHART CHECK): 01/20/2018 Admitted by Dr. Bobby this AM.     Assessment & Plan      Roc Parkinson is a 89-year-old male with history of UTI with ESBL, CKD, type II DM, multiple myeloma undergoing chemotherapy, IBS, AFib, h/o GI bleed who presented on 1/20 due to dizziness and chest pressure.  He was found to have a significantly elevated blood pressure in the emergency department as well as paroxysms of likely atrial tachycardia up into the 120s in the ED.  He is admitted for further management of UTI in the setting of immunocompromised state.       Chest pressure secondary to hypertensive urgency versus chest wall strain.  The patient noted to have blood pressures up to 191/94 and possible correlation with chest pressure when his blood pressures are elevated.  He was also found to have likely atrial tachycardia on telemetry up into the 120s.  His initial EKG shows a new right bundle branch block.  Troponin are negative.  He recently had an echocardiogram on 01/11/2018 showing a normal LV function, no wall motion changes, no pericardial effusion, and impaired LV relaxation.  He has been compliant with his antihypertensive medications.  He has reproducible right-sided chest discomfort to palpation, it is possible that with his recent falls he has a chest wall strain.   - Cotninues on PTA Norvasc and metoprolol, holding prior to admission lisinopril given ROMINA as outlined below.   - Hydralazine and labetalol PRN for SBP > 180 and > 170 respectively. Otherwise, no additional treatment.  -   UTI  The patient has a history of ESBL UTIs.  He remains afebrile, however, is immunocompromised with ongoing chemotherapy.   - Continue meropenem started in the Emergency Department, follow blood and urine cultures.     Acute kidney injury on chronic kidney disease, stage III.  Baseline creatinine is 0.9 to 1.1.  His admission creatinine was 1.45 with a  BUN of 45.  The patient endorses reduced fluid intake over the past couple of weeks.   - S/p 1 liter lactated ringer's bolus in the Emergency Department.   - Continue IV fluids, normal saline at 100 mL per hour.   - Recheck BMP in a.m.     H/o multiple myeloma, myelodysplastic syndrome,   H/o prostate cancer, and superficial bladder cancer.    The patient follows with Dr. Quinn of Oncology.  He is actively undergoing chemotherapy, most recent session was last week.  He is found to have pancytopenia secondary to his chemotherapy, ANC 1.6  - Neutropenia precautions.   - Consult oncology. They should be aware of this acute decline following chemo.   - Oncology consulted re: neupogen. May have already received neulasta and not febrile or septic.    - Continue PTA acyclovir, for herpes infection prophylaxis while on chemotherapy.   - Takes Dexamethasone on Wed and Thursdays. Took last dose last Thursday, 1/18. Continue per PTA schedule if still here on Wed(1/24)     Generalized weakness secondary to above  - PT has been ordered for deconditioning.     DM 2, most recent A1c was 6.3 on 01/08/2018.     Recent Labs  Lab 01/20/18  1704 01/20/18  1225 01/20/18  0726 01/20/18  0434 01/20/18  0220   GLC  --   --   --   --  75   * 100* 85 76  --      - Resumed PTA Levemir 16 units daily in the morning, BS okay this PM.   - Holding prior to admission NovoLog premeal and prior to admission Glucotrol.   - Mod carb diet with FS QID.     BPH,   - resume prior to admission Flomax.     Hypothyroidism, resume prior to admission levothyroxine.     Short-lived atrial tachycardia noted in the Emergency Department. May be secondary to his underlying urinary tract infection and immunocompromised state,  - Resume prior to admission beta blocker as noted with p.r.n. labetalol.   - Monitored on telemetry     Deep venous thrombosis prophylaxis with PCDs.       Code Status Discussed at admission, and he clearly stated he would like DNR/DNI.      Kellee CATALINADevin Melo  [phone] (p)  Text Page (7AM - 6PM)     Interval History   Hemodynamically stable. BP normalized. Afebrile.      -Data reviewed today: I reviewed all new labs and imaging results over the last 24 hours. I personally reviewed no images or EKG's today.    Physical Exam   Temp: 98.7  F (37.1  C) Temp src: Oral BP: 138/64 Pulse: 79 Heart Rate: 75 Resp: 16 SpO2: 90 % O2 Device: None (Room air)    Vitals:    01/20/18 0118 01/20/18 0557   Weight: 90.7 kg (200 lb) 93.8 kg (206 lb 14.4 oz)     Vital Signs with Ranges  Temp:  [97.8  F (36.6  C)-98.7  F (37.1  C)] 98.7  F (37.1  C)  Pulse:  [79] 79  Heart Rate:  [] 75  Resp:  [8-21] 16  BP: (138-191)/() 138/64  SpO2:  [90 %-97 %] 90 %  I/O last 3 completed shifts:  In: -   Out: 850 [Urine:850]      Medications   All medications reviewed on 01/20/18      - MEDICATION INSTRUCTIONS -       NaCl 100 mL/hr at 01/20/18 1712       acyclovir  400 mg Oral BID     amLODIPine  5 mg Oral Daily     ferrous sulfate  325 mg Oral Daily with breakfast     furosemide  20 mg Oral Daily     gemfibrozil  600 mg Oral BID     insulin detemir  16 Units Subcutaneous QAM AC     levothyroxine  25 mcg Oral Daily     metoprolol tartrate  25 mg Oral BID     omeprazole  20 mg Oral QAM     tamsulosin  0.4 mg Oral Daily     sodium chloride (PF)  3 mL Intracatheter Q8H     insulin aspart  1-10 Units Subcutaneous TID AC     insulin aspart  1-7 Units Subcutaneous At Bedtime     meropenem  1 g Intravenous Q12H       Data     Recent Labs  Lab 01/20/18  1443 01/20/18  1045 01/20/18  0220 01/17/18  1025   WBC  --   --  2.3* 5.9   HGB  --   --  8.1* 8.4*   MCV  --   --  112* 113*   PLT  --   --  70* 92*   INR  --   --  1.09  --    NA  --   --  139  --    POTASSIUM  --   --  4.1  --    CHLORIDE  --   --  107  --    CO2  --   --  22  --    BUN  --   --  45*  --    CR  --   --  1.45*  --    ANIONGAP  --   --  10  --    MICHELLE  --   --  9.1  --    GLC  --   --  75  --    ALBUMIN  --   --   2.6*  --    PROTTOTAL  --   --  7.3  --    BILITOTAL  --   --  0.3  --    ALKPHOS  --   --  74  --    ALT  --   --  15  --    AST  --   --  13  --    TROPI 0.016 0.020 0.016  --        Recent Results (from the past 24 hour(s))   XR Chest 2 Views    Narrative    XR CHEST 2 VW  1/20/2018 3:00 AM      HISTORY: Fall, right chest tenderness.      COMPARISON: 1/21/2017.    FINDINGS: The heart is at the upper limits of normal in size without  pulmonary edema. The thoracic aorta is calcified and mildly tortuous.  The lungs are clear. No pneumothorax or pleural effusion. 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      Impression    IMPRESSION: No acute abnormality.    GEORGINA TITUS MD

## 2018-01-21 NOTE — PROGRESS NOTES
[facility]    Hospitalist Progress Note    Date of Service (when I saw patient): 01/21/2018     Assessment & Plan      Roc Parkinson is a 89-year-old male with history of UTI with ESBL, CKD, type II DM, multiple myeloma undergoing chemotherapy, IBS, AFib, h/o GI bleed who presented on 1/20 due to dizziness and chest pressure.  He was found to have a significantly elevated blood pressure in the emergency department as well as paroxysms of likely atrial tachycardia up into the 120s in the ED.  He is admitted for further management of UTI in the setting of immunocompromised state.     Hypertensive urgency with chest pain, suspect chest wall pain from fall.  The patient noted to have blood pressures up to 191/94 and possible correlation with chest pain when his blood pressures are elevated.  He had brief episode of SVT in the 120s His initial EKG shows a new right bundle branch block.  Troponin are negative.  He recently had an echocardiogram on 01/11/2018 showing a normal LV function, no wall motion changes, no pericardial effusion, and impaired LV relaxation.  He has been compliant with his antihypertensive medications.  He has reproducible right-sided chest discomfort to palpation, and suspect the current chest pain is from recent fall. X-ray negative  - Cotninues on PTA Norvasc, metoprolol lasix. Holding PTA lisinopril given ROMINA as outlined below.   - Hydralazine and labetalol PRN for SBP > 180 and > 170 respectively. Otherwise, no additional treatment.  - On 01/21/18, BP better controled with SBP in the 150s,     Brief episodes of self-limiting SVT - Patient states he has a remote history of Afib   - On 01/21/18 tele reviewed, one brief episode of regular SVT at 117 (asymptomatic), o/w NSR.    UTI  Patient noted burning with urination, he has a history of ESBL UTIs.  He remains afebrile, however, is immunocompromised with ongoing chemotherapy.   - On 01/21/18, UCx growing lactose fermenting GNRs.  BCx remain negative.   - Continue meropenem.       Acute kidney injury, RESOLVED ON 01/21/18, on chronic kidney disease, stage III.  Baseline creatinine is 0.9 to 1.1.  His admission creatinine was 1.45 with a BUN of 45.  The patient endorses reduced fluid intake over the past couple of weeks.      Recent Labs  Lab 01/21/18  0834 01/20/18  0220   CR 1.02 1.45*     - D/c IVF on 01/21/18    H/o multiple myeloma, myelodysplastic syndrome,   H/o prostate cancer, and superficial bladder cancer.    The patient follows with Dr. Quinn of Oncology.  He is actively undergoing chemotherapy, most recent session was last week.  He is found to have pancytopenia secondary to his chemotherapy, ANC 1.6  - Neutropenia precautions.   - Consult oncology. They should be aware of this acute decline following chemo.   - Oncology consulted re: neupogen. May have already received neulasta and not febrile or septic.    - Continue PTA acyclovir, for herpes infection prophylaxis while on chemotherapy.   - Takes Dexamethasone on Wed and Thursdays. Took last dose last Thursday, 1/18. Continue per PTA schedule if still here on Wed(1/24).   - On 01/21/18, WBC up to 3.4, hgb stable at 8.5, and plts down to 36. No signs of bleeding.   - Transfuse pRBC for hgb < 7, plts, plts < 10,000. Of course, defer to oncologist, if they recommend differently.     Generalized weakness secondary to above  - PT has been ordered for deconditioning.     DM 2, most recent A1c was 6.3 on 01/08/2018.     Recent Labs  Lab 01/21/18  0834 01/21/18  0656 01/21/18  0210 01/20/18  2102 01/20/18  1704 01/20/18  1225 01/20/18  0726  01/20/18  0220   *  --   --   --   --   --   --   --  75   BGM  --  102* 118* 161* 126* 100* 85  < >  --    < > = values in this interval not displayed.  - Resumed PTA Levemir 16 units daily in the morning, BS okay this PM.   - Resume scheduled meal time insulin at half PTA doses.    - Holding prior to admission NovoLog premeal and prior to  admission Glucotrol.   - Mod carb diet with FS QID.     BPH,   - Continue PTA Flomax.     Hypothyroidism, continue PTA levothyroxine.     Deep venous thrombosis prophylaxis with PCDs.       Code Status Discussed at admission, and he clearly stated he would like DNR/DNI.     Kellee Melo  [phone] (p)  Text Page (7AM - 6PM)     Interval History   BP better controlled. Patient only notes chest pain when he presses on right side of chest where he landed post a fall. He has not SOB, N/V. No symptoms of brief episode of SVT last night. Burning with urination improved but still present.     -Data reviewed today: I reviewed all new labs and imaging results over the last 24 hours. I personally reviewed no images or EKG's today.    Physical Exam   Temp: 98.4  F (36.9  C) Temp src: Oral BP: 155/76 Pulse: 72 Heart Rate: 72 Resp: 16 SpO2: 91 % O2 Device: None (Room air)    Vitals:    01/20/18 0118 01/20/18 0557 01/21/18 0456   Weight: 90.7 kg (200 lb) 93.8 kg (206 lb 14.4 oz) 93.5 kg (206 lb 2.1 oz)     Vital Signs with Ranges  Temp:  [98.4  F (36.9  C)-98.9  F (37.2  C)] 98.4  F (36.9  C)  Pulse:  [72] 72  Heart Rate:  [70-84] 72  Resp:  [16] 16  BP: (138-176)/(64-86) 155/76  SpO2:  [90 %-92 %] 91 %  I/O last 3 completed shifts:  In: 2468 [P.O.:480; I.V.:1988]  Out: 2525 [Urine:2525]    Constitutional: NAD,   Neuropsyche:  alert and oriented, answers questions appropriately, very pleasant affect.   Respiratory: Breathing comfortably, good air exchange, no wheezes, no crackles.   Cardiovascular: Regular rate and rhythm, no edema.  GI:  soft, NT/ND, BS normal  Skin/Integumen:  No acute rash, bruising or sign of bleeding.   MSK: Pain with palpation over the anterior-lateral chest wall.       Medications   All medications reviewed on 01/21/18      - MEDICATION INSTRUCTIONS -       NaCl 100 mL/hr at 01/21/18 0324       acyclovir  400 mg Oral BID     amLODIPine  5 mg Oral Daily     ferrous sulfate  325 mg Oral Daily with  breakfast     furosemide  20 mg Oral Daily     gemfibrozil  600 mg Oral BID     insulin detemir  16 Units Subcutaneous QAM AC     levothyroxine  25 mcg Oral Daily     metoprolol tartrate  25 mg Oral BID     omeprazole  20 mg Oral QAM     tamsulosin  0.4 mg Oral Daily     sodium chloride (PF)  3 mL Intracatheter Q8H     insulin aspart  1-10 Units Subcutaneous TID AC     insulin aspart  1-7 Units Subcutaneous At Bedtime     meropenem  1 g Intravenous Q12H       Data     Recent Labs  Lab 01/21/18  0834 01/20/18  1443 01/20/18  1045 01/20/18  0220 01/17/18  1025   WBC 3.4*  --   --  2.3* 5.9   HGB 8.5*  --   --  8.1* 8.4*   *  --   --  112* 113*   PLT 36*  --   --  70* 92*   INR  --   --   --  1.09  --      --   --  139  --    POTASSIUM 4.1  --   --  4.1  --    CHLORIDE 108  --   --  107  --    CO2 22  --   --  22  --    BUN 29  --   --  45*  --    CR 1.02  --   --  1.45*  --    ANIONGAP 10  --   --  10  --    MICHELLE 8.8  --   --  9.1  --    *  --   --  75  --    ALBUMIN  --   --   --  2.6*  --    PROTTOTAL  --   --   --  7.3  --    BILITOTAL  --   --   --  0.3  --    ALKPHOS  --   --   --  74  --    ALT  --   --   --  15  --    AST  --   --   --  13  --    TROPI  --  0.016 0.020 0.016  --        No results found for this or any previous visit (from the past 24 hour(s)).

## 2018-01-21 NOTE — PROGRESS NOTES
Consult dictated.    89-year-old gentleman with multiple myeloma on chemotherapy admitted with weakness, dizziness, hypertension and UTI.  Labs reveal pancytopenia.  Pancytopenia is secondary to myeloma and chemotherapy.    Plan:  -Monitor CBC.  Transfuse as needed to keep hemoglobin above 8.0 and platelet of 20.  -Continue antibiotics for UTI  -We will plan on dose reduction of chemotherapy with next cycle.

## 2018-01-21 NOTE — PLAN OF CARE
Problem: Patient Care Overview  Goal: Plan of Care/Patient Progress Review  PT: Orders received, chart reviewed. Pt soundly sleeping at time of attempt.  Will continue to follow.

## 2018-01-21 NOTE — PLAN OF CARE
Problem: Patient Care Overview  Goal: Plan of Care/Patient Progress Review  Outcome: Improving  A/O x4, [place]. BP elevated 170/84, improved to 129/66. HR tachy at times, 110's but is short lived and not symptomatic. Tele: Sinus dysrhythmia w/BBB. Other VSS on RA. Up w/1 GB/walker. Denies pain/nausea/SOB. SYED. No chest pain or lightheadedness/dizziness. C/o some burning still with urinating. Continue IV meropenem. BM x2 formed. Good appetite, up in chair for meals, /162. Ambulated x2 with GB/walker. PT consulted. Continue contact/neutropenic precautions.Hem/onc following. +1 BLE edema. Nursing will continue to monitor.

## 2018-01-21 NOTE — PROVIDER NOTIFICATION
MD Notification    Notified Person:  MD    Notified Persons Name: Dr. Melo    Notification Date/Time: 1/21/2018 9:12 AM    Notification Interaction:  Talked with Physician    Purpose of Notification: Critical lab value of 36 for platelets. MD notified in person    Orders Received: None. Will continue to check platelets daily and monitor for bleeding.     Comments:

## 2018-01-21 NOTE — CONSULTS
This consult has been requested by Dr. Kellee Melo for multiple myeloma.      Mr. Parkinson is a 89-year-old gentleman with multiple myeloma diagnosed in 11/2016.  Bone marrow biopsy, in 11/2016 revealed multiple myeloma.  Patient has IgM kappa multiple myeloma.  He is seeing Dr. Quinn in oncology clinic.  He has been on treatment since 12/2016.  Initially he was getting Velcade and dexamethasone. In 04/2017, Cytoxan was added.  He is currently on carfilzomib along with dexamethasone since 01/02/2018.  Last treatment was on 01/18/2018.        The patient came to the ER on 01/20/2018 because of elevated blood pressure.  The patient also has weakness.  About 2-3 weeks ago, he had fallen couple of times at home.  The patient had been monitoring his blood pressure and it was high.  Because of that, he presented to the emergency room on 01/20/2018.      Patient had multiple investigations done in ER on 01/20/2018:    1. WBC of 2.3, hemoglobin of 8.1 and platelet of 70 (on 01/17/2018, WBC of 5.9, hemoglobin of 8.4 and platelets of 92).    2. CMP revealed creatinine of 1.45.  Calcium normal at 9.1.  Low albumin and phosphorus.     3. UA revealed WBC.  Urine culture is positive for lactose fermenting gram-negative rods.  Blood cultures are negative.     4. Chest x-ray did not reveal any acute abnormality.      The patient has been admitted to the hospital because of elevated blood pressure, weakness and UTI.  The patient is currently on IV meropenem.      His CBC has been monitored in the hospital.  He continues to be pancytopenic.  Today his WBC is 3.4, hemoglobin is 8.5, and platelet is 36.      I met with the patient.  He was alone in the room. Patient says that in the last few weeks he has been more weak.  Sometimes he will get a little dizzy and he fell because of that.  When he came to the ER because of elevated blood pressure, he also complained of some chest tightness.      REVIEW OF SYSTEMS:    Denies any headache.  No neck pain.  He is not having any chest pain or shortness of breath now.  No abdominal pain, nausea or vomiting.  No bleeding from any site.  No urinary burning or frequency.  No diarrhea.  No fever, chills or night sweats.  Appetite has been fairly good.  All other review of systems negative.      ALLERGIES:  REVIEWED.      MEDICATIONS:  Reviewed.     Past Medical History:    -Multiple myeloma  -Arthritis  -CKD  -Diabetes mellitus  -Hyperlipidemia  -Hypertension  -Hypothyroidism  -A.Fib  -Prostate cancer     Past Surgical History:    -Knee arthroplasty  -Bladder biopsy  -TURP x2  -Left knee surgery  -Phacoemulsification clear cornea with standard intraocular lens implant  -Skin Melanoma removal     SOCIAL HISTORY:    -He lives by himself.    -No history of smoking.    -No alcohol abuse.        FAMILY HISTORY:   -His mother and father  in their 80s.    -He has 2 brothers and 1 sister.  Sister is turning 100.  His brothers are in their 90s.    -No family history of cancer.       PHYSICAL EXAMINATION:   GENERAL:  He was alert and oriented x 3.   VITAL SIGNS:  Reviewed.   Rest of the systems not examined.      ASSESSMENT:   1. A 89-year-old gentleman with multiple myeloma on chemotherapy admitted with weakness, dizziness, hypertension and urinary tract infection.     2. Pancytopenia secondary to multiple myeloma and chemotherapy.     3. Weakness secondary to age, anemia, multiple myeloma and UTI.     4. Urinary tract infection.     5. Hypertension.     6. History of prostate cancer status post radiation in .      RECOMMENDATIONS:   1. Labs were reviewed with the patient.  I explained to him that his white count, hemoglobin and platelets are low.  This is due to his chemotherapy and myeloma. Complication of pancytopenia discussed.  He is not having any bleeding from thrombocytopenia.  His white count is low, but has not been neutropenic.  At this time, will monitor his CBC.  His CBC should improve.  He always  has baseline thrombocytopenia, but his platelets have come around 100.  His white count should go back to normal.  He is always going to remain anemic. Transfusion will be given as needed to keep hemoglobin above 8 and platelets above 20.    2. Patient is on chemotherapy for multiple myeloma.  Because of this severe cytopenia, dose reduction is needed.  I will talk to Dr. Quinn, his primary oncologist, regarding it.     3. Patient has gram negative UTI.  He is on antibiotics.  I explained to the patient that UTI is contributing to his weakness.    4. Patient has weakness.  He has some dizziness.  He fell at home.  I told the patient that he needs to be very careful.  He needs to hold on to things when he walks around.  He should use a cane or a walker in the house.  He is at high risk of getting bleeding complication as he is thrombocytopenic.     5. We will continue to see him.  Case discussed with Dr. Melo.      Thanks for the consult.      TOTAL TIME SPENT:  60 minutes, more than 50% of time was spent in counseling and coordination of care.         NEW CLEMENTS MD             D: 2018 13:25   T: 2018 14:02   MT: NICKOLAS      Name:     JACOB MEDELLIN   MRN:      [id number]        Account:       [id number]   :      1926           Consult Date:  2018      Document: [id number]

## 2018-01-21 NOTE — PLAN OF CARE
Problem: Patient Care Overview  Goal: Plan of Care/Patient Progress Review  Outcome: No Change  Alert, oriented. Up with 1 assist. Denies pain. Slightly [place]. Slightly elevated BP, other VSS on room air. , 118. WBC 2.3. Tele SR - ST with BBB. IV at 100/hr.

## 2018-01-22 NOTE — PLAN OF CARE
Problem: Patient Care Overview  Goal: Plan of Care/Patient Progress Review  Outcome: Improving  Pt A&Ox3. Protective & contact precautions maintained. Pancytopenic. Up x1 GB/W to BR. . Trace BLE edema. /80, all other VSS. Denies pain. Tele SR w/ BBB. IV saline locked patent. Hem/onco following. Adequate PO intake. Will continue to monitor.

## 2018-01-22 NOTE — PROGRESS NOTES
Mr. Parkinson is a 89-year-old gentleman with multiple myeloma diagnosed in 11/2016. Patient has IgM kappa multiple myeloma. He has been on treatment since 12/2016.  He is currently on carfilzomib along with dexamethasone since 01/02/2018.  Last treatment was on 01/18/2018.         Patient came to the ER on 01/20/2018 because of elevated blood pressure. He also has weakness.  He had multiple investigation done in the ER and found to be pancytopenic.  He also has UTI.  Urine culture is positive for lactose fermenting gram-negative rods. He is currently on IV meropenem.       Overall, he feels better. His weakness is less.  Dizziness is less.       REVIEW OF SYSTEMS:    Denies any headache. No chest pain or shortness of breath.  No abdominal pain, nausea or vomiting.  No fever or chills.        PHYSICAL EXAMINATION:   GENERAL:  He was alert and oriented x 3.   VITAL SIGNS:  Reviewed.   Rest of the systems not examined.       ASSESSMENT:   1. A 89-year-old gentleman with multiple myeloma on chemotherapy has pancytopenia.   2. Pancytopenia secondary to multiple myeloma and chemotherapy.  Improving.      3. Urinary tract infection.      PLAN:  1.  CBC was reviewed with him.  Explained to him that his WBC is back to normal.  Platelets are also improved.  Anemia is stable.  As side effect of chemotherapy wears off, count should improve.  At this time he doesn't need any transfusion or growth factor.  2. Because of cytopenia, dose of future chemotherapy will be reduced.   3.  He'll be followed in clinic next week with CBC.  We'll sign off.  Please call us with any questions.

## 2018-01-22 NOTE — PLAN OF CARE
Problem: Patient Care Overview  Goal: Plan of Care/Patient Progress Review  Outcome: Improving  A/O x4, [place]. HR tachy at times, 110's, asymptomatic. Other VSS on RA. Tele: NSR.  Up w/1 GB/walker. Denies pain/nausea/SOB. SYED. No chest pain or lightheadedness/dizziness. C/o some burning still with urinating, good UOP. Continue IV meropenem. Good appetite, declined chair for supper, /157. Ambulated x1 with GB/walker. PT consulted. Continue contact/neutropenic precautions. Hem/onc following. Platelets 36, hgb 8.5, WBC 3.4, no s/s of bleeding. +1 BLE edema. Nursing will continue to monitor.

## 2018-01-22 NOTE — PLAN OF CARE
Problem: Patient Care Overview  Goal: Plan of Care/Patient Progress Review  PT eval completed,  Pt appears to be at baseline, reports he feels better thand yesterday, I with bed mob and mod I with transfers and amb 380'.  No further PT acute care needs identified.  To cont amb with nursing 3-4 x day.  Rec home to I living apt when med stable.    Physical Therapy Discharge Summary    Reason for therapy discharge:    no needs identified, at base line    Progress towards therapy goal(s). [facility] for goal details.  Goals met    Therapy recommendation(s):    cont amb wt nursing during stay

## 2018-01-22 NOTE — PROGRESS NOTES
01/22/18 1000   Quick Adds   Type of Visit Initial PT Evaluation   Living Environment   Lives With alone   Living Arrangements independent living facility   Home Accessibility no concerns   Self-Care   Regular Exercise no   Equipment Currently Used at Home walker, rolling  (tried tub bench but too big, walkin in shower)   Functional Level Prior   Ambulation 1-->assistive equipment   Transferring 1-->assistive equipment   Toileting 0-->independent   Bathing 0-->independent   Dressing 0-->independent   Eating 0-->independent   Communication 0-->understands/communicates without difficulty   Swallowing 0-->swallows foods/liquids without difficulty   Cognition 0 - no cognition issues reported   Fall history within last six months yes   Number of times patient has fallen within last six months 2   Which of the above functional risks had a recent onset or change? none   Prior Functional Level Comment stays in apt tand makes own meals   General Information   Onset of Illness/Injury or Date of Surgery - Date 01/19/18   Referring Physician Martha Bobby   Patient/Family Goals Statement pt would lik,e to discharge back to his apt   Pertinent History of Current Problem (include personal factors and/or comorbidities that impact the POC) The patient came to the ER on 01/20/2018 because of elevated blood pressure.  The patient also has weakness.  About 2-3 weeks ago, he had fallen couple of times at home.  The patient had been monitoring his blood pressure and it was high.  Because of that, he presented to the emergency room on 01/20/2018.    Precautions/Limitations fall precautions   Weight-Bearing Status - LUE full weight-bearing   Weight-Bearing Status - RUE full weight-bearing   Weight-Bearing Status - LLE full weight-bearing   Weight-Bearing Status - RLE full weight-bearing   Cognitive Status Examination   Orientation orientation to person, place and time   Level of Consciousness alert   Follows Commands and Answers  "Questions 100% of the time   Personal Safety and Judgment intact   Memory intact   Pain Assessment   Patient Currently in Pain No   Range of Motion (ROM)   ROM Quick Adds No deficits were identified   Strength   Strength Comments strength appears to be at baseline   Bed Mobility   Bed Mobility Comments I bed mob    Transfer Skills   Transfer Comments mod I with sit to stand transfers,    Gait   Gait Comments mod I with gait with FWW, 380'   Clinical Impression   Criteria for Skilled Therapeutic Intervention no;evaluation only;current level of function same as previous level of function   Clinical Presentation Stable/Uncomplicated   Clinical Presentation Rationale clincial judgement   Clinical Decision Making (Complexity) Low complexity   Predicted Duration of Therapy Intervention (days/wks) eval only   Anticipated Discharge Disposition Home   Risk & Benefits of therapy have been explained Yes   Patient, Family & other staff in agreement with plan of care Yes   [facility] [employer] TM \"6 Clicks\"   2016, Trustees of [facility], under license to [employer].  All rights reserved.   6 Clicks Short Forms Basic Mobility Inpatient Short Form   [facility]  \"6 Clicks\" V.2 Basic Mobility Inpatient Short Form   1. Turning from your back to your side while in a flat bed without using bedrails? 4 - None   2. Moving from lying on your back to sitting on the side of a flat bed without using bedrails? 4 - None   3. Moving to and from a bed to a chair (including a wheelchair)? 4 - None   4. Standing up from a chair using your arms (e.g., wheelchair, or bedside chair)? 4 - None   5. To walk in hospital room? 4 - None   6. Climbing 3-5 steps with a railing? 4 - None   Basic Mobility Raw Score (Score out of 24.Lower scores equate to lower levels of function) 24   Total Evaluation Time   Total Evaluation Time (Minutes) 30     "

## 2018-01-22 NOTE — PLAN OF CARE
Problem: Patient Care Overview  Goal: Plan of Care/Patient Progress Review  Outcome: No Change  Care Plan Summary Note: vss, alert x4, denied pain. +BS, lungs clear, on RA, denied SOB. /193. No coverage for lunch since pt didn't order anything. PT had signed off on pt, will need nursing staff to ambulate pt QID in unit. SBA x1 with GB/WK. Continue with contact and protective precaution (for absolute neutrophil count (ANC) is less than 500 or total WBC is less than 1000). UC pending, if positive for ESBL, willl need midline placed for IV ABX, pt aware of plan. PIV patent and intact. Son was at bedside and spoke mel MCCALL. Followed and signed off by Oncology, Monitor.     HTN at 1715, schedule BP meds given with improvement--146/68. No PRN needed. Monitor.

## 2018-01-22 NOTE — PROGRESS NOTES
[facility]    Hospitalist Progress Note    Date of Service (when I saw patient): 01/22/2018     Assessment & Plan      Roc Parkinson is a 89-year-old male with history of UTI with ESBL, CKD, type II DM, multiple myeloma undergoing chemotherapy, IBS, AFib, h/o GI bleed who presented on 1/20 due to dizziness and chest pressure.  He was found to have a significantly elevated blood pressure in the emergency department as well as paroxysms of likely atrial tachycardia up into the 120s in the ED.  He is admitted for further management of UTI in the setting of immunocompromised state.     Hypertensive urgency with chest pain, suspect chest wall pain from fall.  The patient noted to have blood pressures up to 191/94 and possible correlation with chest pain when his blood pressures are elevated.  He had brief episode of SVT in the 120s His initial EKG shows a new right bundle branch block.  Troponin are negative.  He recently had an echocardiogram on 01/11/2018 showing a normal LV function, no wall motion changes, no pericardial effusion, and impaired LV relaxation.  He has been compliant with his antihypertensive medications.  He has reproducible right-sided chest discomfort to palpation, and suspect the current chest pain is from recent fall. X-ray negative  - Cotninues on PTA Norvasc, metoprolol lasix.   - Resume lisinopril on 01/22/18.   - Hydralazine and labetalol PRN for SBP > 180 and > 170 respectively. Otherwise, no additional treatment.  - On 01/22/18, BP better controled with SBP in the 140s.     Brief episodes of self-limiting SVT - Patient states he has a remote history of Afib   - On 01/21/18 tele reviewed, one brief episode of regular SVT at 117 (asymptomatic), o/w NSR.    UTI  Patient noted burning with urination, he has a history of ESBL UTIs.  He remains afebrile, however, is immunocompromised with ongoing chemotherapy.   - On 01/22/18, UCx growing 2 strains of lactose fermenting GNRs,  awaiting susceptibilities. BCx remain negative.   - Continue meropenem.       Acute kidney injury, RESOLVED ON 01/21/18, on chronic kidney disease, stage III.  Baseline creatinine is 0.9 to 1.1.  His admission creatinine was 1.45 with a BUN of 45.  The patient endorses reduced fluid intake over the past couple of weeks.        Recent Labs  Lab 01/22/18  0837 01/21/18  0834 01/20/18  0220   CR 1.01 1.02 1.45*     - D/c'd  IVF on 01/21/18    H/o multiple myeloma, myelodysplastic syndrome,   H/o prostate cancer, and superficial bladder cancer.    The patient follows with Dr. Quinn of Oncology.  He is actively undergoing chemotherapy, most recent session was last week.  He is found to have pancytopenia secondary to his chemotherapy, ANC 1.6  - Neutropenia precautions.   - Appreciate oncology consult, plan to reduce dose of future chemo. No growth factor given.   - Continue PTA acyclovir, for herpes infection prophylaxis while on chemotherapy.   - Takes Dexamethasone on Wed and Thursdays. Took last dose last Thursday, 1/18. Continue per PTA schedule if still here on Wed(1/24).   - Transfuse pRBC for hgb < 7, plts, plts < 10,000. Of course, defer to oncologist, if they recommend differently.       Recent Labs  Lab 01/22/18  0837 01/21/18  0834 01/20/18  0220 01/17/18  1025   HGB 8.3* 8.5* 8.1* 8.4*   WBC 4.3 3.4* 2.3* 5.9   PLT 40* 36* 70* 92*       Generalized weakness secondary to above  - PT has been ordered for deconditioning.     DM 2, most recent A1c was 6.3 on 01/08/2018.     Recent Labs  Lab 01/22/18  1259 01/22/18  0837 01/22/18  0827 01/22/18  0215 01/21/18  2118 01/21/18  1632 01/21/18  1250 01/21/18  0834  01/20/18  0220   GLC  --  110*  --   --   --   --   --  183*  --  75   *  --  102* 123* 157* 141* 162*  --   < >  --    < > = values in this interval not displayed.  - Resumed PTA Levemir 16 units daily in the morning, BS okay this PM.   - Resume scheduled meal time insulin at half PTA doses > increase  back to PTA doses.   - Holding PTA Glucotrol.   - Mod carb diet with FS QID.     BPH,   - Continue PTA Flomax.     Hypothyroidism, continue PTA levothyroxine.     Deep venous thrombosis prophylaxis with PCDs.       Code Status Discussed at admission, and he clearly stated he would like DNR/DNI.     Dispo: Likely tomorrow once susceptibilities know on UCx. If ESBL, will need midline for prolonged IV antibiotics.     Kellee Melo  [phone] (p)  Text Page (7AM - 6PM)     Interval History   Doing well, no complaints. Awaiting culture results to determine plan.     -Data reviewed today: I reviewed all new labs and imaging results over the last 24 hours. I personally reviewed no images or EKG's today.    Physical Exam   Temp: 98.6  F (37  C) Temp src: Oral BP: 143/83 Pulse: 78 Heart Rate: 100 Resp: 16 SpO2: 94 % O2 Device: None (Room air)    Vitals:    01/20/18 0557 01/21/18 0456 01/22/18 0710   Weight: 93.8 kg (206 lb 14.4 oz) 93.5 kg (206 lb 2.1 oz) 91.4 kg (201 lb 6.4 oz)     Vital Signs with Ranges  Temp:  [98.6  F (37  C)-98.7  F (37.1  C)] 98.6  F (37  C)  Pulse:  [72-78] 78  Heart Rate:  [] 100  Resp:  [16] 16  BP: (143-169)/(60-83) 143/83  SpO2:  [93 %-94 %] 94 %  I/O last 3 completed shifts:  In: 240 [P.O.:240]  Out: 450 [Urine:450]    Constitutional: NAD,   Neuropsyche:  alert and oriented, answers questions appropriately, very pleasant affect.   Respiratory: Breathing comfortably, good air exchange, no wheezes, no crackles.   Cardiovascular: Regular rate and rhythm, no edema.  GI:  soft, NT/ND, BS normal  Skin/Integumen:  No acute rash, bruising or sign of bleeding.   MSK: Pain with palpation over the anterior-lateral chest wall.       Medications   All medications reviewed on 01/22/18      - MEDICATION INSTRUCTIONS -         lisinopril (PRINIVIL/ZESTRIL) tablet 15 mg  15 mg Oral Daily     insulin aspart  4-6 Units Subcutaneous TID w/meals     acyclovir  400 mg Oral BID     amLODIPine  5 mg Oral  Daily     ferrous sulfate  325 mg Oral Daily with breakfast     furosemide  20 mg Oral Daily     gemfibrozil  600 mg Oral BID     insulin detemir  16 Units Subcutaneous QAM AC     levothyroxine  25 mcg Oral Daily     metoprolol tartrate  25 mg Oral BID     omeprazole  20 mg Oral QAM     tamsulosin  0.4 mg Oral Daily     sodium chloride (PF)  3 mL Intracatheter Q8H     insulin aspart  1-10 Units Subcutaneous TID AC     insulin aspart  1-7 Units Subcutaneous At Bedtime     meropenem  1 g Intravenous Q12H       Data     Recent Labs  Lab 01/22/18  0837 01/21/18  0834 01/20/18  1443 01/20/18  1045 01/20/18  0220   WBC 4.3 3.4*  --   --  2.3*   HGB 8.3* 8.5*  --   --  8.1*   * 110*  --   --  112*   PLT 40* 36*  --   --  70*   INR  --   --   --   --  1.09    140  --   --  139   POTASSIUM 4.1 4.1  --   --  4.1   CHLORIDE 109 108  --   --  107   CO2 23 22  --   --  22   BUN 25 29  --   --  45*   CR 1.01 1.02  --   --  1.45*   ANIONGAP 9 10  --   --  10   MICHELLE 8.9 8.8  --   --  9.1   * 183*  --   --  75   ALBUMIN  --   --   --   --  2.6*   PROTTOTAL  --   --   --   --  7.3   BILITOTAL  --   --   --   --  0.3   ALKPHOS  --   --   --   --  74   ALT  --   --   --   --  15   AST  --   --   --   --  13   TROPI  --   --  0.016 0.020 0.016       No results found for this or any previous visit (from the past 24 hour(s)).

## 2018-01-23 NOTE — PROGRESS NOTES
SW:  D:  Met with son Alfredo who is from [place] and patient.  Reviewed options of discharge home with IV therapy vs coming into the Infusion Center.  Also discussed TCU if patient doesn't feel he has the endurance to returning to living alone and manage his ADL's and IADL's. Reviewed with patient and Alfredo that based on current PT notes he may not qualify for TCU for as long as he will need the IV medication and may be asked to d/c home while till on IV therapy.  In this scenario, writer suggested he could pay privately for transport to bring him in for infusion or son also said he could pay for a nurse to admiinster the IV medication at home.  Under patient's [facility] [place] [facility], he does have home IV coverage and does not need to be homebound.     Patient requested to enter a TCU.  [facility] first preference [facility] where he was a previous patient.  The facility is not available today.  Discussed other options and he is interested in [facility] and both he and his son are aware of the private room rate $36.00 daily.  Patient was assessed and accepted at [facility] and can accept him after 1530 today.  Son, Alfredo, will transport patient.  Web paged Dr Melo to complete TCU orders.    PAS-RR    D: Per DHS regulation, AMRITA completed and submitted PAS-RR to [place] [employer] Direct Connect via the Senior [employer] Line.  PAS-RR confirmation # is : [id number]    I: AMRITA spoke with patient and son and they are aware a PAS-RR has been submitted.  AMRITA reviewed with patient and son  that they may be contacted for a follow up appointment within 10 days of hospital discharge if their SNF stay is < 30 days.  Contact information for [facility] was also provided.    A:   They verbalized understanding.    P: Further questions may be directed to [facility] at #[phone], option #4 for PAS-RR staff.    Due to the time the orders were completed and fax, the facility  requests patient not arrive before 1730.  Son and patient aware.

## 2018-01-23 NOTE — PLAN OF CARE
Problem: Urinary Tract Infection (Adult)  Goal: Signs and Symptoms of Listed Potential Problems Will be Absent, Minimized or Managed (Urinary Tract Infection)  Signs and symptoms of listed potential problems will be absent, minimized or managed by discharge/transition of care (reference Urinary Tract Infection (Adult) CPG).   Outcome: No Change  VSS. A&O.  Up with SBA and walker.  Tele SR w/ BBB. Contact precaution and neutropenic precaution if ( absolute neutrophil count (ANC) is less than 500 or total WBC is less than 1000). Tolerating Mod CHO diet. Sliding scale, no insulin needed per shift.  Ambulated to bathroom to void.  Denies pain.  On room air. Awaiting UC susceptibilities.  Will continue to monitor.

## 2018-01-23 NOTE — DISCHARGE SUMMARY
[facility]    Discharge Summary  Hospitalist    Date of Admission:  1/20/2018  Date of Discharge:  1/23/2018  Discharging Provider: Kellee Melo  Date of Service (when I saw the patient): 01/23/18    History of Present Illness   Roc Parkinson is a 89-year-old male with history of UTI with ESBL, CKD, type II DM, multiple myeloma undergoing chemotherapy, IBS, AFib, h/o GI bleed who presented on 1/20 due to dizziness and chest pressure.  He was found to have a significantly elevated blood pressure in the emergency department as well as paroxysms of likely atrial tachycardia up into the 120s in the ED.  He is admitted for further management of UTI in the setting of immunocompromised state.     Hospital Course      Organized by Discharge Diagnosis  Roc Parkinson was admitted on 1/20/2018.  The following problems were addressed during his hospitalization:     UTI  Patient noted burning with urination, he has a history of E.coli ESBL UTIs.  He remained afebrile, however, is immunocompromised with chemotherapy.   - On 01/22/18, UCx growing 2 strains ESBL  - Continue meropenem. Discussed with ID at discharge and they recommended change to ertapenem for E.coli ESBL and to use low dose at 500 mg.   - Should have CBC, AST and Cr for med monitoring on Monday, 1/29.      Acute kidney injury, RESOLVED ON 01/21/18, on chronic kidney disease, stage III.  Baseline creatinine is 0.9 to 1.1.  His admission creatinine was 1.45 with a BUN of 45.  The patient endorses reduced fluid intake over the past couple of weeks. Received IVF through 1/21.      Recent Labs  Lab 01/23/18  0850 01/22/18  0837 01/21/18  0834 01/20/18  0220   CR 0.99 1.01 1.02 1.45*       H/o multiple myeloma, myelodysplastic syndrome  H/o prostate cancer, and superficial bladder cancer.    The patient follows with Dr. Quinn of Oncology.  He is actively undergoing chemotherapy, most recent session was last week.  He is found to have pancytopenia  secondary to his chemotherapy, ANC 1.6 at admission.   - Neutropenia precautions.   - Appreciate oncology consult, plan to reduce dose of future chemo. No growth factor given.   - Continue PTA acyclovir, for herpes infection prophylaxis while on chemotherapy.   - Transfuse pRBC for hgb < 7, plts, plts < 10,000. Of course, defer to oncologist, if they recommend differently.   - Holding dexamethasone and chemotherapy until follow up with oncology.     Recent Labs  Lab 01/23/18  0850 01/22/18  0837 01/21/18  0834 01/20/18  0220 01/17/18  1025   PLT 63* 40* 36* 70* 92*       Hypertensive urgency with chest pain, suspect chest wall pain from fall.  The patient noted to have blood pressures up to 191/94 and possible correlation with chest pain when his blood pressures are elevated.  He had brief episode of SVT in the 120s His initial EKG shows a new right bundle branch block.  Troponin are negative.  He recently had an echocardiogram on 01/11/2018 showing a normal LV function, no wall motion changes, no pericardial effusion, and impaired LV relaxation.  He has been compliant with his antihypertensive medications.  He has reproducible right-sided chest discomfort to palpation, and suspect the current chest pain is from recent fall. X-ray negative  - Cotninues on PTA Norvasc, lisinopril, metoprolol, lasix. BP better controlled.   Brief episodes of self-limiting SVT - Patient states he has a remote history of Afib   - On 01/21/18 tele reviewed, one brief episode of regular SVT at 117 (asymptomatic), o/w NSR.     Generalized weakness secondary to above  - PT ordered recommended continued PT and will be going to TCU.     DM 2, most recent A1c was 6.3 on 01/08/2018.   - Held PTA Glucotrol.   - Resumed PTA Levemir 16 units daily in the morning.   - Resume scheduled meal time insulin at half PTA doses > increase back to PTA doses of 13,11, 9 units on 1/22  - Hypoglycemia on 01/23/18. Mid-day BS down to 60's.   - Decreased  mealtime novolog at discharge back to 6, 5 and 4 units prior to meals with sliding scale insulin.   - Continue to hold PTA glucotrol.      BPH,   - Continue PTA Flomax.      Hypothyroidism, continue PTA levothyroxine.     Kellee Melo    Significant Results and Procedures   No procedures.   Imaging as outlined below.       Pending Results     Unresulted Labs Ordered in the Past 30 Days of this Admission     Date and Time Order Name Status Description    1/23/2018 1523 Glucose by meter In process     1/20/2018 0413 Blood culture Preliminary     1/20/2018 0413 Blood culture Preliminary           Code Status   DNR / DNI       Primary Care Physician   Dickson Reich    Physical Exam   Temp: 98.2  F (36.8  C) Temp src: Oral BP: 159/77 Pulse: 78 Heart Rate: 74 Resp: 16 SpO2: 93 % O2 Device: None (Room air)    Vitals:    01/21/18 0456 01/22/18 0710 01/23/18 0300   Weight: 93.5 kg (206 lb 2.1 oz) 91.4 kg (201 lb 6.4 oz) 91.5 kg (201 lb 11.5 oz)     Vital Signs with Ranges  Temp:  [98  F (36.7  C)-98.8  F (37.1  C)] 98.2  F (36.8  C)  Pulse:  [67-78] 78  Heart Rate:  [72-74] 74  Resp:  [16] 16  BP: (138-183)/(68-88) 159/77  SpO2:  [93 %-94 %] 93 %  I/O last 3 completed shifts:  In: 620 [P.O.:600; I.V.:20]  Out: -     Constitutional: NAD,   Neuropsyche:  Alert and oriented, answers questions appropriately, very pleasant affect.   Respiratory:   Breathing comfortably, good air exchange, no wheezes, no crackles.   Cardiovascular: Regular rate and rhythm, no edema.  GI:  soft, NT/ND, BS normal  Skin/Integumen:  No acute rash, bruising or sign of bleeding.       Discharge Disposition   Discharged to home  Condition at discharge: Good    Consultations This Hospital Stay   PHYSICAL THERAPY ADULT IP CONSULT  HEMATOLOGY & ONCOLOGY IP CONSULT  VASCULAR ACCESS ADULT IP CONSULT  SOCIAL WORK IP CONSULT  PHYSICAL THERAPY ADULT IP CONSULT  OCCUPATIONAL THERAPY ADULT IP CONSULT    Time Spent on this Encounter   IKellee,  personally saw the patient today and spent greater than 30 minutes discharging this patient.    Discharge Orders     General info for SNF   Length of Stay Estimate: Short Term Care: Estimated # of Days <30  Condition at Discharge: Improving  Level of care:skilled   Rehabilitation Potential: Fair  Admission H&P remains valid and up-to-date: Yes  Recent Chemotherapy: N/A  Use Nursing Home Standing Orders: Yes     Mantoux instructions   Give two-step Mantoux (PPD) Per Facility Policy Yes     Reason for your hospital stay   You were admitted with a catheter-associated ESBL urinary tract infection and low cell counts following chemotherapy.     Glucose monitor nursing POCT   Before meals and at bedtime     Tamayo catheter   To straight gravity drainage. Change catheter every 2 weeks and PRN for leaking or decreased uring output with signs of bladder distention. DO NOT change catheter without a specific MD order IF diagnosis of benign prostatic hypertrophy (BPH), neurogenic bladder, or other urological conditions     IV access   Midline.     Follow Up and recommended labs and tests   Follow up with your oncologist next week with CBC.   You should have AST, Cr within a week of discharge for med monitoring on meropenem.     Activity - Up with nursing assistance     Physical Therapy Adult Consult   Evaluate and treat as clinically indicated.    Reason:  Deconditioned secondary to acute illness.     Occupational Therapy Adult Consult   Evaluate and treat as clinically indicated.    Reason:   Deconditioned secondary to acute illness.   .     Advance Diet as Tolerated   Moderate Consistent CHO (4-6 CHO units/meal)       Discharge Medications   Current Discharge Medication List      START taking these medications    Details   polyethylene glycol (MIRALAX/GLYCOLAX) Packet Take 17 g by mouth 2 times daily as needed (constipation)  Qty: 7 packet    Associated Diagnoses: Slow transit constipation      docusate sodium (COLACE) 100 MG  capsule Take 1 capsule (100 mg) by mouth 2 times daily as needed for constipation  Qty: 60 capsule    Associated Diagnoses: Slow transit constipation      bisacodyl (DULCOLAX) 10 MG Suppository Place 1 suppository (10 mg) rectally daily as needed for constipation  Qty: 30 suppository    Associated Diagnoses: Slow transit constipation      ertapenem (INVANZ) 1 GM injection Inject 0.5 g into the vein every 24 hours for 11 days  Qty: 10 each    Associated Diagnoses: Urinary tract infection associated with indwelling urethral catheter, subsequent encounter         CONTINUE these medications which have CHANGED    Details   insulin aspart (NOVOLOG PEN) 100 UNIT/ML injection Inject 6 units before breakfast, 5 units before lunch, 4 units before dinner, and 4 units at bedtime. Hold for BS sugars.   Before meal correction:  140-175 add 1 unit   176-210 add 2 units   211-245 add 3 units   246-280 add 4 units   281-315 add 5 units  316-350 add 6 units   351-385 add 7 units  386-420 add 8 units   Over 420 add 9 units      Qty: 12 mL, Refills: 3    Associated Diagnoses: Uncontrolled type 2 diabetes mellitus with hyperglycemia, unspecified long term insulin use status (H); Type 2 diabetes mellitus with diabetic chronic kidney disease, unspecified CKD stage, unspecified long term insulin use status (H); Type 2 diabetes mellitus with stage 3 chronic kidney disease, with long-term current use of insulin (H)      insulin detemir (LEVEMIR FLEXPEN/FLEXTOUCH) 100 UNIT/ML injection Inject 16 Units Subcutaneous every morning (before breakfast)  Qty: 15 mL, Refills: 1    Associated Diagnoses: Type 2 diabetes mellitus with diabetic chronic kidney disease, unspecified CKD stage, unspecified long term insulin use status (H); Uncontrolled type 2 diabetes mellitus with hyperglycemia, unspecified long term insulin use status (H); Type 2 diabetes mellitus with stage 3 chronic kidney disease, with long-term current use of insulin (H)          CONTINUE these medications which have NOT CHANGED    Details   LISINOPRIL PO Take 15 mg by mouth daily      amLODIPine (NORVASC) 5 MG tablet TAKE 1 TABLET BY MOUTH DAILY  Qty: 90 tablet, Refills: 3    Associated Diagnoses: Essential hypertension, benign      furosemide (LASIX) 20 MG tablet Take 1 tablet (20 mg) by mouth daily  Qty: 90 tablet, Refills: 1    Associated Diagnoses: Multiple myeloma not having achieved remission (H)      prochlorperazine (COMPAZINE) 10 MG tablet Take 1 tablet (10 mg) by mouth every 6 hours as needed (Nausea/Vomiting)  Qty: 30 tablet, Refills: 2    Associated Diagnoses: Multiple myeloma not having achieved remission (H)      gemfibrozil (LOPID) 600 MG tablet TAKE 1 TABLET BY MOUTH TWICE DAILY  Qty: 180 tablet, Refills: 1    Associated Diagnoses: Hyperlipidemia      levothyroxine (SYNTHROID/LEVOTHROID) 25 MCG tablet TAKE 1 TABLET BY MOUTH EVERY DAY  Qty: 90 tablet, Refills: 0    Associated Diagnoses: Acquired hypothyroidism      tamsulosin (FLOMAX) 0.4 MG capsule TAKE 1 CAPSULE BY MOUTH DAILY  Qty: 90 capsule, Refills: 2    Associated Diagnoses: Malignant neoplasm of prostate (H)      metoprolol (LOPRESSOR) 25 MG tablet TAKE 1 TABLET BY MOUTH TWICE DAILY  Qty: 180 tablet, Refills: 1    Associated Diagnoses: Essential hypertension      omeprazole (PRILOSEC) 20 MG CR capsule TAKE 1 CAPSULE BY MOUTH EVERY DAY  Qty: 90 capsule, Refills: 3    Associated Diagnoses: Congenital hiatus hernia      acyclovir (ZOVIRAX) 400 MG tablet Take 1 tablet (400 mg) by mouth 2 times daily Viral Prophylaxis.  Qty: 60 tablet, Refills: 3    Associated Diagnoses: Multiple myeloma not having achieved remission (H)      ondansetron (ZOFRAN) 8 MG tablet Take 1 tablet (8 mg) by mouth every 8 hours as needed (Nausea/Vomiting)  Qty: 10 tablet, Refills: 2    Associated Diagnoses: Multiple myeloma not having achieved remission (H)      Ascorbic Acid (VITAMIN C PO) Take 1,000 mg by mouth daily      Cholecalciferol  (VITAMIN D3 PO) Take 1,000 Units by mouth daily      ferrous sulfate (IRON) 325 (65 FE) MG tablet Take 325 mg by mouth daily (with breakfast)      Selenium 200 MCG TABS Take 100 mcg by mouth daily. Pt takes one half tab of the 200 mcg daily       !! B-D U/F 31G X 8 MM insulin pen needle USE 5 PEN NEEDLES PER DAY OR AS DIRECTED  Qty: 500 each, Refills: 1    Associated Diagnoses: Type 2 diabetes mellitus with stage 3 chronic kidney disease, with long-term current use of insulin (H)      FREESTYLE LITE test strip USE 1 STRIP TO TEST TWICE DAILY.  Qty: 200 strip, Refills: 1    Associated Diagnoses: Uncontrolled type 1 diabetes mellitus with stage 3 chronic kidney disease (H)      !! B-D U/F 31G X 8 MM insulin pen needle        !! - Potential duplicate medications found. Please discuss with provider.      STOP taking these medications       LORazepam (ATIVAN) 0.5 MG tablet Comments:   Reason for Stopping:         dexamethasone (DECADRON) 4 MG tablet Comments:   Reason for Stopping:         glipiZIDE (GLUCOTROL) 10 MG tablet Comments:   Reason for Stopping:         polyethylene glycol (MIRALAX/GLYCOLAX) powder Comments:   Reason for Stopping:             Allergies   No Known Allergies  Data     IMAGING RESULTS FROM THIS HOSPITAL STAY:  Results for orders placed or performed during the hospital encounter of 01/20/18   XR Chest 2 Views    Narrative    XR CHEST 2 VW  1/20/2018 3:00 AM      HISTORY: Fall, right chest tenderness.      COMPARISON: 1/21/2017.    FINDINGS: The heart is at the upper limits of normal in size without  pulmonary edema. The thoracic aorta is calcified and mildly tortuous.  The lungs are clear. No pneumothorax or pleural effusion. 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      Impression    IMPRESSION: No acute abnormality.    GEORGINA TITUS MD       MOST RECENT LAB RESULTS:  Most Recent 3 CBC's:  Recent Labs   Lab Test  01/23/18   0850  01/22/18   0837  01/21/18   0834   WBC  4.6  4.3   3.4*   HGB  8.6*  8.3*  8.5*   MCV  111*  111*  110*   PLT  63*  40*  36*      Most Recent 3 BMP's:  Recent Labs   Lab Test  01/23/18   0850  01/22/18   0837  01/21/18   0834   NA  139  141  140   POTASSIUM  4.0  4.1  4.1   CHLORIDE  107  109  108   CO2  24  23  22   BUN  24  25  29   CR  0.99  1.01  1.02   ANIONGAP  8  9  10   MICHELLE  9.3  8.9  8.8   GLC  84  110*  183*     Most Recent 3 Troponin's:  Recent Labs   Lab Test  01/20/18   1443  01/20/18   1045  01/20/18   0220   TROPI  0.016  0.020  0.016     Most Recent 3 INR's:  Lab Test  01/20/18   0220   INR  1.09     Most Recent 2 LFT's:  Recent Labs   Lab Test  01/20/18   0220  01/10/18   0951   AST  13  13   ALT  15  14   ALKPHOS  74  82   BILITOTAL  0.3  0.5     Most Recent 6 Bacteria Isolates From Any Culture (See EPIC Reports for Culture Details):  Lab Test  01/20/18   0439  01/20/18   0424  01/20/18   0314   CULT  No growth after 3 days  No growth after 3 days  >100,000 colonies/mL  Escherichia coli ESBL  *  >100,000 colonies/mL  Strain 2  Escherichia coli ESBL  *  Enterobacteriaceae that are susceptible to meropenem are usually susceptible to ertapenem.  ESBL (extended beta lactamase) producing organisms require contact precautions.     Lab Test  01/08/18   0902   TSH   --    T4   --    A1C  6.3*

## 2018-01-23 NOTE — PLAN OF CARE
Problem: Patient Care Overview  Goal: Plan of Care/Patient Progress Review  Discharging to TCU this afternoon.  Midline catheter placed for ongoing IV antibiotics.  Son will transport.  BG 61 this afternoon; Dr. Melo aware and adjusting insulin with hold orders for TCU discharge orders.  Up with SBA in room and ambulated gómez with walker/GB.

## 2018-01-23 NOTE — PROVIDER NOTIFICATION
For midline or PICC placement and for outpatient antibiotic planning, need length of expected antibiotic.  Text page to Dr. Melo.

## 2018-01-23 NOTE — PLAN OF CARE
Problem: Patient Care Overview  Goal: Plan of Care/Patient Progress Review  Outcome: Improving  Pt A&Ox4. /80, all other VSS. Denies pain. Up x1 w/ GB/W to BR. Tele SR w/ BBB. BG 87. Contact & protective precautions maintained. IV saline locked patent. Adequate PO intake. D/C pending possibly today pending susceptibilities UC. Will continue to monitor.

## 2018-01-23 NOTE — PLAN OF CARE
Problem: Patient Care Overview  Goal: Plan of Care/Patient Progress Review  Outcome: Adequate for Discharge Date Met: 01/23/18  Pt A/Ox4. VSS on RA with HTN managed with meds.  Discharging to TCU.  Midline catheter placed for ongoing IV antibiotics.  Son will transport to [facility].  BG 99 this afternoon; insulin held.  Up with SBA in room and ambulated gómez with walker/GB.  Discharge reviewed with pt and son. Pt discharging with belongings.

## 2018-01-23 NOTE — PROGRESS NOTES
As Care Coordinator discussed with pt earlier, plans for transition to [facility] today are in process.  He has an infusion appointment for his targeted therapy for 1/31 and 2/1 (every Wednesday and Thursday).  Discussed with Dr Melo and she has conversed with Dr Villaseñor.  His Dexamethasone that he receives with chemo is on hold.  Dr Melo wants pt to keep his clinic appointment so that he can review counts.  What is in question is if he will have his infusion with active UTI.  Conversed with Dr Quinn's nurse Jeanie.  She will review with Dr Quinn tomorrow and update [facility].

## 2018-01-23 NOTE — PROGRESS NOTES
Care Coordinator met with pt concerning discharge planning and need for IV antibiotics for 11 more days.  In discussion with pt, discussed home infusion vs outpt infusion vs TCU.  Pt favors TCU due to living alone and does not feel comfortable doing this himself.  He feels trying to find someone to bring him to the infusion center for 11 days would be a huge burden.  He felt transition to a TCU would most likely be the best as long as it was covered.  Pt would most likely benefit from continued PT, as PT did an eval yesterday with instruction that pt should ambulate 3-4x per day.  Nsg is noting up with assistance.  He has been to [facility] before after a knee surgery, and prefers this facility due to location.  SW is aware.  He asked that writer call his Son Alfredo ([phone]).  Discussed with Alfredo and he is in agreement for a TCU.  Alfredo lives in [place] and is presently in town for his father in law's .  He will be in town until Thursday.  Will plan for discharge today if we are able to confirm TCU placement.  Midline cath to be placed.

## 2018-01-25 NOTE — TELEPHONE ENCOUNTER
Call received from Care coordinator prior to patient hospital discharge on 1/23/18 that he is accepted to [facility] for 11 days of iv antibiotics for a UTI.  They will not accept him if he is on any chemo during this time ( 1/31/ and 2/1 are scheduled)    Dr. Quinn is ok with delaying treatment 1 week.    Spoke with GABY Ayala at [facility] to inform her of this([phone])  Will keep the labs and Dr. Quinn appointment on 1/31/18 but delay kyprolis.

## 2018-01-25 NOTE — PROGRESS NOTES
PRIMARY CARE PROVIDER AND CLINIC RESPONSIBLE:  RachanaDickson Clark, [address] [facility] [address]        ADMISSION HISTORY AND PHYSICAL EXAMINATION     Chief Complaint   Patient presents with     Fatigue         HISTORY OF PRESENT ILLNESS:  89 year old male, (7/7/1926), admitted to the [facility] from Hospital  [facility].  Hospital stay 1/20/18 through 1/23/18.  Admitted to this facility for  rehab, medical management and nursing care.    HPI information obtained from: facility chart records, facility staff, patient report and [facility] chart review.    Roc Parkinson with history of multiple myeloma on chemotherapy, MDS, atrial fibrillation, hx of GI bleed, CKD III and hx of prostate and superficial bladder cancer who was admitted at [facility] from 1/20/18 to 1/23/18 due to dizziness and chest pressure. In ED, he was found to have jefferson BP, atrial tachycardia and UTI. He was diagnosed ESBL E Coli  UTI in setting of immunosuppression related to chemotherapy. He was treated with Meropenem and switched Ertapenem upon discharge and PICC line was placed. Patient was scheduled to follow urologist for wilson issue. He had x-ray to confirm placement of PICC last night. Patient is sitting a chair and feels weak. Slept poorly, appetite ok and had BM. Patient denies chest pain, dyspnea, abdominal pain, n&v, fever, chills, leg pain or swelling. The rest of ROS is unremarkable. Hospital H&P and discharge reviewed.    CODE STATUS/ADVANCE DIRECTIVES DISCUSSION:   DNR / DNI    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       Past Surgical History:   Procedure Laterality Date     ARTHROPLASTY KNEE  11/5/2012    Procedure:  ARTHROPLASTY KNEE;  RIGHT TOTAL KNEE ARTHROPLASTY (SMITH & NEPHEW)^;  Surgeon: Dickson Schulte MD;  Location:  OR     BIOPSY      bladder     COLONOSCOPY  2007     COLONOSCOPY N/A 11/15/2016    Procedure: COMBINED COLONOSCOPY, SINGLE OR MULTIPLE BIOPSY/POLYPECTOMY BY BIOPSY;  Surgeon: Kenneth Fulton MD;  Location:  GI     GENITOURINARY SURGERY      TURP x2 and bladder scraping     GI SURGERY      anal fistula     ORTHOPEDIC SURGERY      lt knee in nov.2009     PHACOEMULSIFICATION CLEAR CORNEA WITH STANDARD INTRAOCULAR LENS IMPLANT Left 10/25/2017    Procedure: PHACOEMULSIFICATION CLEAR CORNEA WITH STANDARD INTRAOCULAR LENS IMPLANT;  LEFT EYE PHACOEMULSIFICATION CLEAR CORNEA WITH STANDARD INTRAOCULAR LENS IMPLANT ;  Surgeon: Shady Haider MD;  Location:  EC     PHACOEMULSIFICATION CLEAR CORNEA WITH STANDARD INTRAOCULAR LENS IMPLANT Right 11/1/2017    Procedure: PHACOEMULSIFICATION CLEAR CORNEA WITH STANDARD INTRAOCULAR LENS IMPLANT;  RIGHT EYE PHACOEMULSIFICATION CLEAR CORNEA WITH STANDARD INTRAOCULAR LENS IMPLANT;  Surgeon: Shady Haider MD;  Location:  EC     SOFT TISSUE SURGERY      melanoma       Current Outpatient Prescriptions   Medication Sig     Blood Glucose Monitoring Suppl MISC 3 times daily (before meals) Blood sugar check  before meals and at  bedtime  before meals and at  bedtime for  Diabetes     heparin 100 UNIT/ML SOLN injection by Intracatheter route every 8 hours Saline Flush  Solution (Sodium  Chloride Flush)  Use 10 ml  intravenously every  shift for IV  Maintance IV site:  Left forearm  -Order Date     Insulin Lispro (HUMALOG SC) Inject 6 Units Subcutaneous Before breakfast.     Insulin Lispro (HUMALOG SC) Inject 4 Units Subcutaneous Inject 4 unit subcutaneously one time a day for Diabetes  Units/mL before dinner, and bedtime.Inject 4 unit subcutaneously one time a day for Diabetes  Units/mL before dinner, and bedtime.     Insulin Lispro (HUMALOG SC) Inject  5 Units Subcutaneous daily (before lunch)     Insulin Lispro (HUMALOG SC) Inject as per sliding  scale:if 140 - 175 = 1 unit,176 - 210 = 2 units;211 - 245 = 3 units;246 - 280 = 4 units;281 - 315 = 5 units;316 - 350 = 6 units;351 - 385 = 7 units;386 - 420 = 8 units;421+ = 9 units,subcutaneouslybefore meals for Diabetes II     polyethylene glycol (MIRALAX/GLYCOLAX) Packet Take 17 g by mouth 2 times daily as needed (constipation)     docusate sodium (COLACE) 100 MG capsule Take 1 capsule (100 mg) by mouth 2 times daily as needed for constipation     bisacodyl (DULCOLAX) 10 MG Suppository Place 1 suppository (10 mg) rectally daily as needed for constipation     insulin detemir (LEVEMIR FLEXPEN/FLEXTOUCH) 100 UNIT/ML injection Inject 16 Units Subcutaneous every morning (before breakfast)     LISINOPRIL PO Take 15 mg by mouth daily     ertapenem (INVANZ) 1 GM injection Inject 0.5 g into the vein every 24 hours     amLODIPine (NORVASC) 5 MG tablet TAKE 1 TABLET BY MOUTH DAILY     furosemide (LASIX) 20 MG tablet Take 1 tablet (20 mg) by mouth daily     prochlorperazine (COMPAZINE) 10 MG tablet Take 1 tablet (10 mg) by mouth every 6 hours as needed (Nausea/Vomiting)     gemfibrozil (LOPID) 600 MG tablet TAKE 1 TABLET BY MOUTH TWICE DAILY     levothyroxine (SYNTHROID/LEVOTHROID) 25 MCG tablet TAKE 1 TABLET BY MOUTH EVERY DAY     tamsulosin (FLOMAX) 0.4 MG capsule TAKE 1 CAPSULE BY MOUTH DAILY     B-D U/F 31G X 8 MM insulin pen needle USE 5 PEN NEEDLES PER DAY OR AS DIRECTED     FREESTYLE LITE test strip USE 1 STRIP TO TEST TWICE DAILY.     metoprolol (LOPRESSOR) 25 MG tablet TAKE 1 TABLET BY MOUTH TWICE DAILY     omeprazole (PRILOSEC) 20 MG CR capsule TAKE 1 CAPSULE BY MOUTH EVERY DAY     B-D U/F 31G X 8 MM insulin pen needle      acyclovir (ZOVIRAX) 400 MG tablet Take 1 tablet (400 mg) by mouth 2 times daily Viral Prophylaxis.     ondansetron (ZOFRAN) 8 MG tablet Take 1 tablet (8 mg) by mouth every 8 hours as needed  "(Nausea/Vomiting)     Ascorbic Acid (VITAMIN C PO) Take 1,000 mg by mouth daily     Cholecalciferol (VITAMIN D3 PO) Take 1,000 Units by mouth daily     ferrous sulfate (IRON) 325 (65 FE) MG tablet Take 325 mg by mouth daily (with breakfast)     Selenium 200 MCG TABS Take 100 mcg by mouth daily. Pt takes one half tab of the 200 mcg daily      [DISCONTINUED] cyclophosphamide (CYTOXAN) 50 MG capsule CHEMO Take 6 capsules (300 mg) by mouth once a week     No current facility-administered medications for this visit.        No Known Allergies    Social History     Social History     Marital status:      Spouse name: N/A     Number of children: N/A     Years of education: N/A     Occupational History     Not on file.     Social History Main Topics     Smoking status: Never Smoker     Smokeless tobacco: Never Used     Alcohol use No     Drug use: No     Sexual activity: No     Other Topics Concern     Parent/Sibling W/ Cabg, Mi Or Angioplasty Before 65f 55m? No     Social History Narrative        Patient's living condition: lives alone    Family History   Problem Relation Age of Onset     Cardiovascular Father 81     heart arrythmia     Endocrine Disease Brother 74     Paget's       Information reviewed:  Medications, vital signs, orders, nursing notes, problem list, hospital information.     ROS: All 10 point review of system completed, those pertinent positive, please see H&P, the remaining ROS is negative.      /84  Pulse 82  Temp 98.8  F (37.1  C)  Resp 18  Ht 5' 7\" (1.702 m)  Wt 202 lb 9.6 oz (91.9 kg)  SpO2 94%  BMI 31.73 kg/m2    PHYSICAL EXAMINATION:  GENERAL:  No acute distress.  SKIN:  Dry and warm.  There is no rash, lesions, ulcers or juandice at area of skin examined.  HEENT:  Head without trauma.  Pupils round, reactive. Exam of conjunctiva and lids are normal. Sclera without icterus. There is no oral thrush.  NECK:  Supple.  There is no cervical adenopathy, no thyromegaly. No jugular " venous distension.  CHEST: No reproducible chest tenderness.   LUNGS:  Normal respiratory effort. Lungs are Clear on ascultation.  HEART:  Regular rate and rhythm.  No murmur, gallops or rubs auscultated.  ABDOMEN:  Soft, bowel sounds positive.  There is no tenderness or guarding.   EXTREMITIES: No edema. Normal range of motion. No calf swelling or tenderness. Tamayo in place.  NEUROLOGIC:  Alert and oriented x3.  Moving all ext. Gait not tested.  PSYCH: Recent and remote memory is normal. Mood and affect are normal.    Lab/Diagnostic data:  Reviewed.    CBC Lab Results (Last 5 results in 365 days)       WBC RBC Hgb Hct MCV MCH MCHC RDW Plt Neut # Lymph # Eos # Baso # Immat. Gran #   01/23/18 0850 4.6 2.32 8.6 25.8 111 37.1 33.3 19.0 63 -- -- -- -- --   01/22/18 0837 4.3 2.25 8.3 24.9 111 36.9 33.3 18.6 40 4.0 0.1 0.0 0.0 --   01/21/18 0834 3.4 2.28 8.5 25.1 110 37.3 33.9 18.4 36 -- -- -- -- --   01/20/18 0220 2.3 2.22 8.1 24.8 112 36.5 32.7 18.8 70 1.6 0.3 0.0 0.0 --   01/17/18 1025 5.9 2.26 8.4 25.6 113 37.2 32.8 19.4 92 5.1 0.3 0.0 0.0 0.3   CMP Labs (Last 5 results in 365 days)       Na+ K+ Cl CO2 ANION GAP Glc BUN Creat GFR GFR-Black Calcium Mg ALT AST A1C TSH LDL HIV   01/23/18 1620 -- -- -- -- -- 99 -- -- -- -- -- -- -- -- -- -- -- --   01/23/18 1523 -- -- -- -- -- 72 -- -- -- -- -- -- -- -- -- -- -- --   01/23/18 1459 -- -- -- -- -- 61 -- -- -- -- -- -- -- -- -- -- -- --   01/23/18 1223 -- -- -- -- -- 79 -- -- -- -- -- -- -- -- -- -- -- --   01/23/18 0850 139 4.0 107 24 8 84 24 0.99 70 85 9.3 --           Results for orders placed or performed during the hospital encounter of 01/20/18   XR Chest 2 Views    Narrative    XR CHEST 2 VW  1/20/2018 3:00 AM      HISTORY: Fall, right chest tenderness.      COMPARISON: 1/21/2017.    FINDINGS: The heart is at the upper limits of normal in size without  pulmonary edema. The thoracic aorta is calcified and mildly tortuous.  The lungs are clear. No pneumothorax or  pleural effusion. 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      Impression    IMPRESSION: No acute abnormality.    GEORGINA TITUS MD         ASSESSMENT / PLAN:     Physical deconditioning  Plan: PT/OT with increase independence, self-care, strength and endurance and other cares that help return to home/facility of origin, fall precautions. Care conference with patient and family for the progress of rehab and disposition issues will be discussed as planned. Rehab evaluation and other evaluations including CPT are at rehab logs, to be reviewed separately.  Fall risk assessment as well as cognitive evaluation so far performed:  SLUMS:  23/30  Tinetti:  18/28  Bed mobility: mod i  Transfers: sba  Ambulating distance: 130  Feet sba  Fww    UTI due to extended-spectrum beta lactamase (ESBL) producing Escherichia coli  Plan: Continue IV Ertapenem and labs scheduled on 1/29/17. Follow up ID as scheduled.    Essential hypertension  Plan: Continue metoprolol and lisinopril. Systolic BP elevated today, will monitor, no change dose today.    Type 2 diabetes mellitus without complication, with long-term current use of insulin (H)  Plan: continue current medications Levemir and insulin with meals, diet and monitor BG closely.  Lab Results   Component Value Date    A1C 6.3 01/08/2018    A1C 8.0 05/23/2017    A1C 8.0 11/14/2016    A1C 8.8 09/28/2016    A1C 8.2 05/09/2016       Multiple myeloma not having achieved remission (H)  Plan: Continue to monitor labs, he is receiving ongoing chemotherapy by Dr. Quinn. Follow up oncologist as scheduled.    MDS (myelodysplastic syndrome) (H)  Plan: Continue to monitor cbc. As above follow up hem/onc.    Malignant neoplasm of prostate (H) and superficial bladder cancer  Plan: follow up urologist as scheduled.    Other problems with same care. Primary care doctor and other specialists to address those chronic problems in next clinic appointment to be scheduled upon discharge from  the TCU      Total time spent with patient visit was 37 min including patient visit, review of past records, patients counseling and coordinating care.        Lane Burroughs MD

## 2018-01-26 NOTE — TELEPHONE ENCOUNTER
Routing refill request to provider for review/approval because:  Blood pressure out of range

## 2018-01-29 PROBLEM — I10 ESSENTIAL HYPERTENSION: Status: ACTIVE | Noted: 2018-01-01

## 2018-01-29 PROBLEM — Z16.12 UTI DUE TO EXTENDED-SPECTRUM BETA LACTAMASE (ESBL) PRODUCING ESCHERICHIA COLI: Status: ACTIVE | Noted: 2018-01-01

## 2018-01-29 PROBLEM — R53.81 PHYSICAL DECONDITIONING: Status: ACTIVE | Noted: 2018-01-01

## 2018-01-29 PROBLEM — Z79.4 TYPE 2 DIABETES MELLITUS WITHOUT COMPLICATION, WITH LONG-TERM CURRENT USE OF INSULIN (H): Status: ACTIVE | Noted: 2018-01-01

## 2018-01-29 PROBLEM — B96.29 UTI DUE TO EXTENDED-SPECTRUM BETA LACTAMASE (ESBL) PRODUCING ESCHERICHIA COLI: Status: ACTIVE | Noted: 2018-01-01

## 2018-01-29 PROBLEM — E11.9 TYPE 2 DIABETES MELLITUS WITHOUT COMPLICATION, WITH LONG-TERM CURRENT USE OF INSULIN (H): Status: ACTIVE | Noted: 2018-01-01

## 2018-01-29 PROBLEM — N39.0 UTI DUE TO EXTENDED-SPECTRUM BETA LACTAMASE (ESBL) PRODUCING ESCHERICHIA COLI: Status: ACTIVE | Noted: 2018-01-01

## 2018-01-29 NOTE — PROGRESS NOTES
[facility]    Chief Complaint   Patient presents with     Nursing Home Acute       HPI:    Roc Parkinson is a 89 year old  (7/7/1926), who is being seen today for an episodic care visit at [facility].  HPI information obtained from: facility chart records, facility staff, patient report and [facility] chart review.Today's concern is: does feels a bit stronger. He has no complaints except the tiredness.      Physical deconditioning  UTI due to extended-spectrum beta lactamase (ESBL) producing Escherichia coli  Essential hypertension  Type 2 diabetes mellitus without complication, with long-term current use of insulin (H)  Multiple myeloma not having achieved remission (H)  MDS (myelodysplastic syndrome) (H)  Malignant neoplasm of prostate (H)     No CP, SOB, Cough, dizziness, fevers, chills, HA, N/V, dysuria or Bowel Abnormalities. Appetite is down.  No pains    ALLERGIES: Review of patient's allergies indicates no known allergies.  Past Medical, Surgical, Family and Social History reviewed and updated in [facility].    Current Outpatient Prescriptions   Medication Sig Dispense Refill     tamsulosin (FLOMAX) 0.4 MG capsule Take 0.4 mg by mouth At Bedtime       Blood Glucose Monitoring Suppl MISC 3 times daily (before meals) Blood sugar check  before meals and at  bedtime  before meals and at  bedtime for  Diabetes       Insulin Lispro (HUMALOG SC) Inject 6 Units Subcutaneous Before breakfast.       Insulin Lispro (HUMALOG SC) Inject 4 Units Subcutaneous Inject 4 unit subcutaneously one time a day for Diabetes  Units/mL before dinner, and bedtime.Inject 4 unit subcutaneously one time a day for Diabetes  Units/mL before dinner, and bedtime.       Insulin Lispro (HUMALOG SC) Inject 5 Units Subcutaneous daily (before lunch)       Insulin Lispro (HUMALOG SC) Inject as per sliding  scale:if 140 - 175 = 1 unit,176 - 210 = 2 units;211 - 245 = 3 units;246 - 280 = 4  units;281 - 315 = 5 units;316 - 350 = 6 units;351 - 385 = 7 units;386 - 420 = 8 units;421+ = 9 units,subcutaneouslybefore meals for Diabetes II       polyethylene glycol (MIRALAX/GLYCOLAX) Packet Take 17 g by mouth 2 times daily as needed (constipation) 7 packet      docusate sodium (COLACE) 100 MG capsule Take 1 capsule (100 mg) by mouth 2 times daily as needed for constipation 60 capsule      bisacodyl (DULCOLAX) 10 MG Suppository Place 1 suppository (10 mg) rectally daily as needed for constipation 30 suppository      insulin detemir (LEVEMIR FLEXPEN/FLEXTOUCH) 100 UNIT/ML injection Inject 16 Units Subcutaneous every morning (before breakfast) 15 mL 1     LISINOPRIL PO Take 15 mg by mouth daily       ertapenem (INVANZ) 1 GM injection Inject 0.5 g into the vein every 24 hours 7 each      amLODIPine (NORVASC) 5 MG tablet TAKE 1 TABLET BY MOUTH DAILY 90 tablet 3     furosemide (LASIX) 20 MG tablet Take 1 tablet (20 mg) by mouth daily 90 tablet 1     gemfibrozil (LOPID) 600 MG tablet TAKE 1 TABLET BY MOUTH TWICE DAILY 180 tablet 1     levothyroxine (SYNTHROID/LEVOTHROID) 25 MCG tablet TAKE 1 TABLET BY MOUTH EVERY DAY 90 tablet 0     B-D U/F 31G X 8 MM insulin pen needle USE 5 PEN NEEDLES PER DAY OR AS DIRECTED 500 each 1     FREESTYLE LITE test strip USE 1 STRIP TO TEST TWICE DAILY. 200 strip 1     metoprolol (LOPRESSOR) 25 MG tablet TAKE 1 TABLET BY MOUTH TWICE DAILY 180 tablet 1     omeprazole (PRILOSEC) 20 MG CR capsule TAKE 1 CAPSULE BY MOUTH EVERY DAY 90 capsule 3     B-D U/F 31G X 8 MM insulin pen needle        ondansetron (ZOFRAN) 8 MG tablet Take 1 tablet (8 mg) by mouth every 8 hours as needed (Nausea/Vomiting) 10 tablet 2     Ascorbic Acid (VITAMIN C PO) Take 1,000 mg by mouth daily       ferrous sulfate (IRON) 325 (65 FE) MG tablet Take 325 mg by mouth daily (with breakfast)       Selenium 200 MCG TABS Take 100 mcg by mouth daily. Pt takes one half tab of the 200 mcg daily        heparin 100 UNIT/ML SOLN  "injection by Intracatheter route every 8 hours Saline Flush  Solution (Sodium  Chloride Flush)  Use 10 ml  intravenously every  shift for IV  Maintance IV site:  Left forearm  -Order Date       [DISCONTINUED] cyclophosphamide (CYTOXAN) 50 MG capsule CHEMO Take 6 capsules (300 mg) by mouth once a week 24 capsule 0     acyclovir (ZOVIRAX) 400 MG tablet Take 1 tablet (400 mg) by mouth 2 times daily Viral Prophylaxis. 60 tablet 3     Cholecalciferol (VITAMIN D3 PO) Take 1,000 Units by mouth daily       Medications reviewed:  Medications reconciled to facility chart and changes were made to reflect current medications as identified as above med list. Below are the changes that were made:   Medications stopped since last EPIC medication reconciliation:   There are no discontinued medications.    Medications started since last [facility] medication reconciliation:  No orders of the defined types were placed in this encounter.        REVIEW OF SYSTEMS:  See under HPI above     Physical Exam:  /82  Pulse 78  Temp 97.5  F (36.4  C)  Resp 18  Ht 5' 7\" (1.702 m)  Wt 202 lb 9.6 oz (91.9 kg)  SpO2 95%  BMI 31.73 kg/m2 PHYSICAL    GENERAL:  Alert oriented, in no acute distress.  HEENT:  Oral mucosa is moist, intact, conjunctiva and lids are normal.   RESP:  Normal respiratory effort. Lungs are clear on ascultation. Non labored.  CV:  S1/S2, RRR No murmur, gallops or rubs auscultated. No edema.   PICC site EMA WNL but with dried blood on drsg.  ABDOMEN:  Soft, bowel sounds positive.  No abd discomfort on palpation  : voids, not seen  M/S: No edema. Normal range of motion. No calf swelling or tenderness.    SKIN:  Dry and warm, intact. No obvious rashes/open areas but limited exam as pt fully dressed.  NEUROLOGIC:  Alert and oriented x3.  Moving all ext, up with assist.  PSYCH: Recent and remote memory is normal. Mood and affect are normal.        BP Readings from Last 3 Encounters:   01/29/18 148/82   01/25/18 170/84 "   01/23/18 165/75     Recent Labs:   CBC RESULTS:   Recent Labs   Lab Test  01/23/18   0850  01/22/18   0837   WBC  4.6  4.3   RBC  2.32*  2.25*   HGB  8.6*  8.3*   HCT  25.8*  24.9*   MCV  111*  111*   MCH  37.1*  36.9*   MCHC  33.3  33.3   RDW  19.0*  18.6*   PLT  63*  40*       Last Basic Metabolic Panel:  Recent Labs   Lab Test  01/23/18   0850  01/22/18   0837   NA  139  141   POTASSIUM  4.0  4.1   CHLORIDE  107  109   MICHELLE  9.3  8.9   CO2  24  23   BUN  24  25   CR  0.99  1.01   GLC  84  110*       Liver Function Studies -   Recent Labs   Lab Test  01/20/18   0220  01/10/18   0951   PROTTOTAL  7.3  8.1   ALBUMIN  2.6*  2.7*   BILITOTAL  0.3  0.5   ALKPHOS  74  82   AST  13  13   ALT  15  14       TSH   Date Value Ref Range Status   09/28/2016 5.53 (H) 0.40 - 5.00 mU/L Final   10/14/2015 6.95 (H) 0.40 - 5.00 mU/L Final   ]  Lab Results   Component Value Date    A1C 6.3 01/08/2018    A1C 8.0 05/23/2017     ASSESSMENT / PLAN:  (R53.81) Physical deconditioning  (primary encounter diagnosis)  Comment/Plan: PT OT    (N39.0,  A49.8,  Z16.12) UTI due to extended-spectrum beta lactamase (ESBL) producing Escherichia coli  Comment/Plan: cont Abx, has PICC, monitor    (I10) Essential hypertension  Comment/Plan: controlled, range is 120-140's SBP mostly, monitor. No changes today    (E11.9,  Z79.4) Type 2 diabetes mellitus without complication, with long-term current use of insulin (H)  Comment/Plan: better, had some 200's and once > 400 but last few days in the mid 100's, cont same regimen of short and long acting insulins.    (C90.00) Multiple myeloma not having achieved remission (H)   (D46.9) MDS (myelodysplastic syndrome) (H)   (C61) Malignant neoplasm of prostate (H)  Comment/Plan: cont Flomax, change to HS dosing. Sees Dr Quinn 1/31 for possible chemo resumption if labs ok.      Electronically signed by  ALEXANDER Riojas CNP

## 2018-01-31 NOTE — PATIENT INSTRUCTIONS
1.  Continue Carfilzomib/Dexamethasone (dose of Carfilzomib reduced by 20%).   2.  Zometa every 12 weeks  3. Continue Aranesp 300 mcg SQ every three weeks  4- RTC MD 4 weeks  5- Continue Acyclovir    Please hold treatment for 1 week during U stay at [facility], [facility] per Dr. Quinn

## 2018-01-31 NOTE — MR AVS SNAPSHOT
After Visit Summary   1/31/2018    Roc Parkinson    MRN: [id number]           Patient Information     Date Of Birth          7/7/1926        Visit Information        Provider Department      1/31/2018 11:00 AM Nurse, [facility]        Today's Diagnoses     Multiple myeloma not having achieved remission (H)        Macrocytic anemia           Follow-ups after your visit        Your next 10 appointments already scheduled     Feb 07, 2018 11:00 AM CST   Level 2 with  INFUSION CHAIR 8   [facility] ([facility])    [facility] [place] [id number]  [place] [address]   [phone]            Feb 08, 2018  9:30 AM CST   Level 2 with  INFUSION CHAIR 2   [facility] ([facility])    [facility]  [address]  [place] [address]   [phone]            Feb 08, 2018 10:00 AM CST   Return Visit with Gretel Quinn MD   [facility] ([facility])    [facility] [place] [id number]  [place] [address]   [phone]            Feb 14, 2018 11:30 AM CST   Level 2 with  INFUSION CHAIR 12   [facility] ([facility])    [facility]  [address]  [place] [address]   [phone]            Feb 15, 2018 11:00 AM CST   Level 2 with  INFUSION CHAIR 20   [facility] ([facility])    [facility]  [address]  [place] [address]   [phone]            Feb 21, 2018 11:00 AM CST   Level 2 with  INFUSION CHAIR 20   [facility] ([facility])    [facility]  [address]  [place] [address]   [phone]            Feb 21, 2018 11:30 AM CST  "  Return Visit with Gretel Quinn MD   [facility] ([facility])    [facility] [place]  [address]  [place] [id number] [phone]            2018 11:00 AM CST   Level 2 with  INFUSION CHAIR 15   [facility] ([facility])    [facility] [place] [address]  [place] [address] [phone]              Who to contact     If you have questions or need follow up information about today's clinic visit or your schedule please contact [facility] directly at [phone].  Normal or non-critical lab and imaging results will be communicated to you by [employer], letter or phone within 4 business days after the clinic has received the results. If you do not hear from us within 7 days, please contact the clinic through MyChart or phone. If you have a critical or abnormal lab result, we will notify you by phone as soon as possible.  Submit refill requests through [employer] or call your pharmacy and they will forward the refill request to us. Please allow 3 business days for your refill to be completed.          Additional Information About Your Visit        [employer] [facility] Back[employer] Information     [employer] lets you send messages to your doctor, view your test results, renew your prescriptions, schedule appointments and more. To sign up, go to [email or web address] . Click on \"Log in\" on the left side of the screen, which will take you to the Welcome page. Then click on \"Sign up Now\" on the right side of the page.     You will be asked to enter the access code listed below, as well as some personal information. Please follow the directions to create your username and password.     Your access code is: [email or web address]  Expires: 3/26/2018 11:10 AM     Your access code will  in 90 days. If you need help or a new code, please call your [place] clinic or [phone].        Care EveryWhere ID  "    This is your Care EveryWhere ID. This could be used by other organizations to access your [place] medical records  [id number]         Blood Pressure from Last 3 Encounters:   01/31/18 119/68   01/29/18 148/82   01/25/18 170/84    Weight from Last 3 Encounters:   01/31/18 92.9 kg (204 lb 12.8 oz)   01/29/18 91.9 kg (202 lb 9.6 oz)   01/25/18 91.9 kg (202 lb 9.6 oz)              We Performed the Following     CBC with platelets differential     Comprehensive metabolic panel          Today's Medication Changes          These changes are accurate as of 1/31/18 12:00 PM.  If you have any questions, ask your nurse or doctor.               These medicines have changed or have updated prescriptions.        Dose/Directions    ertapenem 1 GM injection   Commonly known as:  INVanz   This may have changed:  additional instructions   Used for:  Urinary tract infection associated with indwelling urethral catheter, subsequent encounter        Dose:  0.5 g   Inject 0.5 g into the vein every 24 hours   Quantity:  7 each   Refills:  0                Primary Care Provider Office Phone # Fax #    Dickson Clark Reich [phone] [address] [facility][1] [id number]        Equal Access to Services     ELIE GAGNON AH: Jeanne griffino Soaimee, waaxda lunealadaha, qaybta kaalmada adesadieyada, melita rodriguez. So [facility] [phone].    ATENCIÓN: Si habla español, tiene a nolan disposición servicios gratuitos de asistencia lingüística. Llame al [phone].    We comply with applicable federal civil rights laws and [place] laws. We do not discriminate on the basis of race, color, national origin, age, disability, sex, sexual orientation, or gender identity.            Thank you!     Thank you for choosing [facility]  for your care. Our goal is always to provide you with excellent care. Hearing back from our patients is one way we can continue to improve our services.  Please take a few minutes to complete the written survey that you may receive in the mail after your visit with us. Thank you!             Your Updated Medication List - Protect others around you: Learn how to safely use, store and throw away your medicines at www.disposemymeds.org.          This list is accurate as of 1/31/18 12:00 PM.  Always use your most recent med list.                   Brand Name Dispense Instructions for use Diagnosis    acyclovir 400 MG tablet    ZOVIRAX    60 tablet    Take 1 tablet (400 mg) by mouth 2 times daily Viral Prophylaxis.    Multiple myeloma not having achieved remission (H)       amLODIPine 5 MG tablet    NORVASC    90 tablet    TAKE 1 TABLET BY MOUTH DAILY    Essential hypertension, benign       * B-D U/F 31G X 8 MM   Generic drug:  insulin pen needle           * B-D U/F 31G X 8 MM   Generic drug:  insulin pen needle     500 each    USE 5 PEN NEEDLES PER DAY OR AS DIRECTED    Type 2 diabetes mellitus with stage 3 chronic kidney disease, with long-term current use of insulin (H)       bisacodyl 10 MG Suppository    DULCOLAX    30 suppository    Place 1 suppository (10 mg) rectally daily as needed for constipation    Slow transit constipation       Blood Glucose Monitoring Suppl Misc      3 times daily (before meals) Blood sugar check before meals and at bedtime before meals and at bedtime for Diabetes        docusate sodium 100 MG capsule    COLACE    60 capsule    Take 1 capsule (100 mg) by mouth 2 times daily as needed for constipation    Slow transit constipation       ertapenem 1 GM injection    INVanz    7 each    Inject 0.5 g into the vein every 24 hours    Urinary tract infection associated with indwelling urethral catheter, subsequent encounter       ferrous sulfate 325 (65 FE) MG tablet    IRON     Take 325 mg by mouth daily (with breakfast)        FLOMAX 0.4 MG capsule   Generic drug:  tamsulosin      Take 0.4 mg by mouth At Bedtime        FREESTYLE LITE test strip    Generic drug:  blood glucose monitoring     200 strip    USE 1 STRIP TO TEST TWICE DAILY.    Uncontrolled type 1 diabetes mellitus with stage 3 chronic kidney disease (H)       furosemide 20 MG tablet    LASIX    90 tablet    Take 1 tablet (20 mg) by mouth daily    Multiple myeloma not having achieved remission (H)       gemfibrozil 600 MG tablet    LOPID    180 tablet    TAKE 1 TABLET BY MOUTH TWICE DAILY    Hyperlipidemia       heparin 100 UNIT/ML Soln injection      by Intracatheter route every 8 hours Saline Flush Solution (Sodium Chloride Flush) Use 10 ml intravenously every shift for IV Maintance IV site: Left forearm -Order Date        * HUMALOG SC      Inject 6 Units Subcutaneous Before breakfast.        * HUMALOG SC      Inject 4 Units Subcutaneous Inject 4 unit subcutaneously one time a day for Diabetes  Units/mL before dinner, and bedtime.Inject 4 unit subcutaneously one time a day for Diabetes  Units/mL before dinner, and bedtime.        * HUMALOG SC      Inject 5 Units Subcutaneous daily (before lunch)        * HUMALOG SC      Inject as per sliding scale:if 140 - 175 = 1 unit,176 - 210 = 2 units;211 - 245 = 3 units;246 - 280 = 4 units;281 - 315 = 5 units;316 - 350 = 6 units;351 - 385 = 7 units;386 - 420 = 8 units;421+ = 9 units,subcutaneouslybefore meals for Diabetes II        insulin detemir 100 UNIT/ML injection    LEVEMIR FLEXPEN/FLEXTOUCH    15 mL    Inject 16 Units Subcutaneous every morning (before breakfast)    Type 2 diabetes mellitus with diabetic chronic kidney disease, unspecified CKD stage, unspecified long term insulin use status (H), Uncontrolled type 2 diabetes mellitus with hyperglycemia, unspecified long term insulin use status (H), Type 2 diabetes mellitus with stage 3 chronic kidney disease, with long-term current use of insulin (H)       levothyroxine 25 MCG tablet    SYNTHROID/LEVOTHROID    90 tablet    TAKE 1 TABLET BY MOUTH EVERY DAY    Acquired hypothyroidism        LISINOPRIL PO      Take 15 mg by mouth daily        metoprolol tartrate 25 MG tablet    LOPRESSOR    180 tablet    TAKE 1 TABLET BY MOUTH TWICE DAILY    Essential hypertension       omeprazole 20 MG CR capsule    priLOSEC    90 capsule    TAKE 1 CAPSULE BY MOUTH EVERY DAY    Congenital hiatus hernia       ondansetron 8 MG tablet    ZOFRAN    10 tablet    Take 1 tablet (8 mg) by mouth every 8 hours as needed (Nausea/Vomiting)    Multiple myeloma not having achieved remission (H)       polyethylene glycol Packet    MIRALAX/GLYCOLAX    7 packet    Take 17 g by mouth 2 times daily as needed (constipation)    Slow transit constipation       Selenium 200 MCG Tabs      Take 100 mcg by mouth daily. Pt takes one half tab of the 200 mcg daily        VITAMIN C PO      Take 1,000 mg by mouth daily        VITAMIN D3 PO      Take 1,000 Units by mouth daily        * Notice:  This list has 6 medication(s) that are the same as other medications prescribed for you. Read the directions carefully, and ask your doctor or other care provider to review them with you.

## 2018-01-31 NOTE — LETTER
1/31/2018         RE: Roc Parkinson  [address]   [place] [facility] [address]        Dear Colleague,    Thank you for referring your patient, Roc Parkinson, to the [facility]. Please see a copy of my visit note below.    [facility] PHYSICIANS  HEMATOLOGY ONCOLOGY     DIAGNOSIS:    1- Kappa light chain IgM multiple myeloma in a 89-year-old patient.  Bone marrow biopsy on 11/17/2016 showed hypercellular bone marrow with 65% cellularity of light chain restricted plasma cells.  FISH or G-banding could not be performed due to insufficient sample.   There is a background of myelodysplastic syndrome.  The patient was initially seen in the hospital on 11/17/2016 for macrocytic anemia and pancytopenia and transfusion requirement and a bone marrow biopsy was performed.   2- History of prostate cancer s/p EBRT s/p brachytherapy in 2003 (recent PSA 0.10), superficial bladder cancer ,no recurrences since 1986     TREATMENT: 12/15/16 velcade/dex. 4/12/17 added cyclophosphamide 300 mg weekly.6/28/17 we discontinued velcade due to concern for neuropathy and continued with weekly cyclophosphamide and dexamethasone.   1/2/18 switched to Carfilzomib and dexamethasone.      SUBJECTIVE:  The patient was seen as a followup today. He is fatigued but is recovering well. No fever. Breathing is better.     REVIEW OF SYSTEMS:  A complete review of systems was performed and found to be negative other than pertinent positives mentioned in history of present illness.     Past medical, social histories reviewed.    Meds- Reviewed.     PHYSICAL EXAMINATION:   VITAL SIGNS:Wt 92.9 kg (204 lb 12.8 oz)  BMI 32.08 kg/m2  CONSTITUTIONAL: Sitting comfortably.   HEENT: Pupils are equal. Oropharynx is clear.   NECK: No cervical or supraclavicular lymphadenopathy.   RESPIRATORY: Clear bilaterally.   CARD/VASC: S1, S2, regular.   GI: Soft, nontender, nondistended, no hepatosplenomegaly.   MUSKULOSKELETAL:  Warm, well perfused.   NEUROLOGIC: Alert, awake.   INTEGUMENT: No rash.   LYMPHATICS: No edema.   PSYCH: Mood and affect was normal.     LABORATORY DATA AND IMAGING REVIEWED DURING THIS VISIT:  Recent Labs   Lab Test  01/23/18   0850  01/22/18   0837  01/21/18   0834  01/20/18 0220   NA  139  141  140  139   POTASSIUM  4.0  4.1  4.1  4.1   CHLORIDE  107  109  108  107   CO2  24  23  22  22   ANIONGAP  8  9  10  10   BUN  24  25  29  45*   CR  0.99  1.01  1.02  1.45*   GLC  84  110*  183*  75   MICHELLE  9.3  8.9  8.8  9.1   MAG   --   2.3   --   2.2   PHOS   --    --   2.9  2.2*     Recent Labs   Lab Test  01/23/18   0850  01/22/18   0837  01/21/18   0834  01/20/18   0220  01/17/18   1025   WBC  4.6  4.3  3.4*  2.3*  5.9   HGB  8.6*  8.3*  8.5*  8.1*  8.4*   PLT  63*  40*  36*  70*  92*   MCV  111*  111*  110*  112*  113*   NEUTROPHIL   --   93.0   --   70.0  85.8     Recent Labs   Lab Test  01/20/18   0220  01/10/18   0951  01/03/18   1120   11/17/16   0938   BILITOTAL  0.3  0.5  0.5   < >   --    ALKPHOS  74  82  86   < >   --    ALT  15  14  14   < >   --    AST  13  13  16   < >   --    ALBUMIN  2.6*  2.7*  2.7*   < >   --    LDH   --    --    --    --   110    < > = values in this interval not displayed.     ECOG PS: 1    ASSESSMENT:  This is a 89-year-old gentleman who has kappa light chain multiple myeloma with hypercellular marrow with 65% of cells are light chain restricted plasma cells.  He had severe pancytopenia and has needed a transfusion in the hospital.  He did not have hypercalcemia and his renal function was normal. He has a background of myelodysplastic syndrome on his bone marrow biopsy; however, majority of the cell population was monoclonal plasma cell population.   He was started on treatment due to cytopenia.   In 4/2017 his light chain levels were getting worse. M spike was stable. We added cyclophosphamide to the regimen. In 6/2017 discontinued velcade for concern of development of neuropathy,  "in hindsight the pain appeared to be related to arthritis.     - He is on Aranesp 300 mcg SQ every three weeks.    He is on Carfilzomib and dexamethasone.     - he was admitted to the hospital on 1/20 for UTI and was also cytopenic. Given cytopenia related to chemotherapy I will reduce the dose of Carfilzomib by 20%.   - Labs from today are in progress- if his labs are within parameters then he will resume chemotherapy today.     PLAN:   1.  Continue Carfilzomib/Dexamethasone (dose of Carfilzomib reduced by 20%).   2.  Zometa every 12 weeks  3. Continue Aranesp 300 mcg SQ every three weeks  4- RTC MD 4 weeks  5- Continue Acyclovir    GRETEL QUINN MD    1/31/2018        Oncology Rooming Note    January 31, 2018 10:58 AM   Roc Parkinson is a 89 year old male who presents for:    Chief Complaint   Patient presents with     Oncology Clinic Visit     Initial Vitals: /68 (BP Location: Left arm, Patient Position: Chair, Cuff Size: Adult Regular)  Pulse 83  Temp 97.8  F (36.6  C) (Oral)  Resp 18  Wt 92.9 kg (204 lb 12.8 oz)  SpO2 95%  BMI 32.08 kg/m2 Estimated body mass index is 32.08 kg/(m^2) as calculated from the following:    Height as of 1/29/18: 1.702 m (5' 7\").    Weight as of this encounter: 92.9 kg (204 lb 12.8 oz). Body surface area is 2.1 meters squared.  No Pain (0) Comment: Data Unavailable   No LMP for male patient.  Allergies reviewed: Yes  Medications reviewed: Yes    Medications: Medication refills not needed today.  Pharmacy name entered into [facility]:    [facility] [place], [place] - [address] [facility] - [place], [place] - [address] AT [facility] [address]    Clinical concerns: None     5 minutes for nursing intake (face to face time)     Diana [facility]                Again, thank you for allowing me to participate in the care of your patient.        Sincerely,        Gretel Quinn MD    "

## 2018-01-31 NOTE — PROGRESS NOTES
[facility] PHYSICIANS  HEMATOLOGY ONCOLOGY     DIAGNOSIS:    1- Kappa light chain IgM multiple myeloma in a 89-year-old patient.  Bone marrow biopsy on 11/17/2016 showed hypercellular bone marrow with 65% cellularity of light chain restricted plasma cells.  FISH or G-banding could not be performed due to insufficient sample.   There is a background of myelodysplastic syndrome.  The patient was initially seen in the hospital on 11/17/2016 for macrocytic anemia and pancytopenia and transfusion requirement and a bone marrow biopsy was performed.   2- History of prostate cancer s/p EBRT s/p brachytherapy in 2003 (recent PSA 0.10), superficial bladder cancer ,no recurrences since 1986     TREATMENT: 12/15/16 velcade/dex. 4/12/17 added cyclophosphamide 300 mg weekly.6/28/17 we discontinued velcade due to concern for neuropathy and continued with weekly cyclophosphamide and dexamethasone.   1/2/18 switched to Carfilzomib and dexamethasone.      SUBJECTIVE:  The patient was seen as a followup today. He is fatigued but is recovering well. No fever. Breathing is better.     REVIEW OF SYSTEMS:  A complete review of systems was performed and found to be negative other than pertinent positives mentioned in history of present illness.     Past medical, social histories reviewed.    Meds- Reviewed.     PHYSICAL EXAMINATION:   VITAL SIGNS:Wt 92.9 kg (204 lb 12.8 oz)  BMI 32.08 kg/m2  CONSTITUTIONAL: Sitting comfortably.   HEENT: Pupils are equal. Oropharynx is clear.   NECK: No cervical or supraclavicular lymphadenopathy.   RESPIRATORY: Clear bilaterally.   CARD/VASC: S1, S2, regular.   GI: Soft, nontender, nondistended, no hepatosplenomegaly.   MUSKULOSKELETAL: Warm, well perfused.   NEUROLOGIC: Alert, awake.   INTEGUMENT: No rash.   LYMPHATICS: No edema.   PSYCH: Mood and affect was normal.     LABORATORY DATA AND IMAGING REVIEWED DURING THIS VISIT:  Recent Labs   Lab Test  01/23/18   0850  01/22/18   0837   01/21/18   0834  01/20/18   0220   NA  139  141  140  139   POTASSIUM  4.0  4.1  4.1  4.1   CHLORIDE  107  109  108  107   CO2  24  23  22  22   ANIONGAP  8  9  10  10   BUN  24  25  29  45*   CR  0.99  1.01  1.02  1.45*   GLC  84  110*  183*  75   MICHELLE  9.3  8.9  8.8  9.1   MAG   --   2.3   --   2.2   PHOS   --    --   2.9  2.2*     Recent Labs   Lab Test  01/23/18   0850  01/22/18   0837  01/21/18   0834  01/20/18   0220  01/17/18   1025   WBC  4.6  4.3  3.4*  2.3*  5.9   HGB  8.6*  8.3*  8.5*  8.1*  8.4*   PLT  63*  40*  36*  70*  92*   MCV  111*  111*  110*  112*  113*   NEUTROPHIL   --   93.0   --   70.0  85.8     Recent Labs   Lab Test  01/20/18   0220  01/10/18   0951  01/03/18   1120   11/17/16   0938   BILITOTAL  0.3  0.5  0.5   < >   --    ALKPHOS  74  82  86   < >   --    ALT  15  14  14   < >   --    AST  13  13  16   < >   --    ALBUMIN  2.6*  2.7*  2.7*   < >   --    LDH   --    --    --    --   110    < > = values in this interval not displayed.     ECOG PS: 1    ASSESSMENT:  This is a 89-year-old gentleman who has kappa light chain multiple myeloma with hypercellular marrow with 65% of cells are light chain restricted plasma cells.  He had severe pancytopenia and has needed a transfusion in the hospital.  He did not have hypercalcemia and his renal function was normal. He has a background of myelodysplastic syndrome on his bone marrow biopsy; however, majority of the cell population was monoclonal plasma cell population.   He was started on treatment due to cytopenia.   In 4/2017 his light chain levels were getting worse. M spike was stable. We added cyclophosphamide to the regimen. In 6/2017 discontinued velcade for concern of development of neuropathy, in hindsight the pain appeared to be related to arthritis.     - He is on Aranesp 300 mcg SQ every three weeks.    He is on Carfilzomib and dexamethasone.     - he was admitted to the hospital on 1/20 for UTI and was also cytopenic. Given cytopenia  related to chemotherapy I will reduce the dose of Carfilzomib by 20%.   - Labs from today are in progress- if his labs are within parameters then he will resume chemotherapy today.     PLAN:   1.  Continue Carfilzomib/Dexamethasone (dose of Carfilzomib reduced by 20%).   2.  Zometa every 12 weeks  3. Continue Aranesp 300 mcg SQ every three weeks  4- RTC MD 4 weeks  5- Continue Acyclovir    ALISNO JON MD    1/31/2018

## 2018-01-31 NOTE — MR AVS SNAPSHOT
After Visit Summary   1/31/2018    Roc Parkinson    MRN: [id number]           Patient Information     Date Of Birth          7/7/1926        Visit Information        Provider Department      1/31/2018 12:00 PM SH INFUSION CHAIR 6 [facility]        Today's Diagnoses     MDS (myelodysplastic syndrome) (H)    -  1       Follow-ups after your visit        Your next 10 appointments already scheduled     Feb 07, 2018 11:00 AM CST   Level 2 with SH INFUSION CHAIR 8   [facility] ([facility])    [facility]  [address]  [place] [address]   [phone]            Feb 08, 2018  9:30 AM CST   Level 2 with SH INFUSION CHAIR 2   [facility] ([facility])    [facility]  [address]  [place] [address]   [phone]            Feb 08, 2018 10:00 AM CST   Return Visit with Gretel Quinn MD   [facility] ([facility])    [facility]  [address]  [place] [address]   [phone]            Feb 14, 2018 11:30 AM CST   Level 2 with SH INFUSION CHAIR 12   [facility] ([facility])    [facility]  [address]  [place] [address]   [phone]            Feb 15, 2018 11:00 AM CST   Level 2 with SH INFUSION CHAIR 20   [facility] ([facility])    [facility]  [address]  [place] [address]   [phone]            Feb 21, 2018 11:00 AM CST   Level 2 with SH INFUSION CHAIR 20   [facility] ([facility])    [facility]  [address]  [place] [address]   [phone]            Feb 21, 2018 11:30 AM CST   Return Visit  "with Gretel Quinn MD   [facility] ([facility])    [facility]  [address]  [place] [address] [phone]            2018 11:00 AM CST   Level 2 with  INFUSION CHAIR 15   [facility] ([facility])    [facility] [place]  [address]  [place] [address] [phone]              Who to contact     If you have questions or need follow up information about today's clinic visit or your schedule please contact [facility] directly at [phone].  Normal or non-critical lab and imaging results will be communicated to you by Box[employer], letter or phone within 4 business days after the clinic has received the results. If you do not hear from us within 7 days, please contact the clinic through MyChart or phone. If you have a critical or abnormal lab result, we will notify you by phone as soon as possible.  Submit refill requests through [employer] or call your pharmacy and they will forward the refill request to us. Please allow 3 business days for your refill to be completed.          Additional Information About Your Visit        BoxTonehart Information     [employer] lets you send messages to your doctor, view your test results, renew your prescriptions, schedule appointments and more. To sign up, go to [email or web address] . Click on \"Log in\" on the left side of the screen, which will take you to the Welcome page. Then click on \"Sign up Now\" on the right side of the page.     You will be asked to enter the access code listed below, as well as some personal information. Please follow the directions to create your username and password.     Your access code is: [email or web address]  Expires: 3/26/2018 11:10 AM     Your access code will  in 90 days. If you need help or a new code, please call your [place] clinic or [phone].        Care EveryWhere " ID     This is your Care EveryWhere ID. This could be used by other organizations to access your [place] medical records  [id number]         Blood Pressure from Last 3 Encounters:   01/31/18 119/68   01/29/18 148/82   01/25/18 170/84    Weight from Last 3 Encounters:   01/31/18 92.9 kg (204 lb 12.8 oz)   01/29/18 91.9 kg (202 lb 9.6 oz)   01/25/18 91.9 kg (202 lb 9.6 oz)              Today, you had the following     No orders found for display         Today's Medication Changes          These changes are accurate as of 1/31/18 12:39 PM.  If you have any questions, ask your nurse or doctor.               These medicines have changed or have updated prescriptions.        Dose/Directions    ertapenem 1 GM injection   Commonly known as:  INVanz   This may have changed:  additional instructions   Used for:  Urinary tract infection associated with indwelling urethral catheter, subsequent encounter        Dose:  0.5 g   Inject 0.5 g into the vein every 24 hours   Quantity:  7 each   Refills:  0                Primary Care Provider Office Phone # Fax #    Dickson Clark Reich [phone] [address] [facility] [address]        Equal Access to Services     ELIE GAGNON AH: Hadii virginia griffino Somaeali, waaxda luqadaha, qaybta kaalmada adeegyada, melita rodriguez. So [facility] [phone].    ATENCIÓN: Si habla español, tiene a nolan disposición servicios gratuitos de asistencia lingüística. Llame al [phone].    We comply with applicable federal civil rights laws and [place] laws. We do not discriminate on the basis of race, color, national origin, age, disability, sex, sexual orientation, or gender identity.            Thank you!     Thank you for choosing [facility]  for your care. Our goal is always to provide you with excellent care. Hearing back from our patients is one way we can continue to improve our services. Please take a  few minutes to complete the written survey that you may receive in the mail after your visit with us. Thank you!             Your Updated Medication List - Protect others around you: Learn how to safely use, store and throw away your medicines at www.disposemymeds.org.          This list is accurate as of 1/31/18 12:39 PM.  Always use your most recent med list.                   Brand Name Dispense Instructions for use Diagnosis    acyclovir 400 MG tablet    ZOVIRAX    60 tablet    Take 1 tablet (400 mg) by mouth 2 times daily Viral Prophylaxis.    Multiple myeloma not having achieved remission (H)       amLODIPine 5 MG tablet    NORVASC    90 tablet    TAKE 1 TABLET BY MOUTH DAILY    Essential hypertension, benign       * B-D U/F 31G X 8 MM   Generic drug:  insulin pen needle           * B-D U/F 31G X 8 MM   Generic drug:  insulin pen needle     500 each    USE 5 PEN NEEDLES PER DAY OR AS DIRECTED    Type 2 diabetes mellitus with stage 3 chronic kidney disease, with long-term current use of insulin (H)       bisacodyl 10 MG Suppository    DULCOLAX    30 suppository    Place 1 suppository (10 mg) rectally daily as needed for constipation    Slow transit constipation       Blood Glucose Monitoring Suppl Misc      3 times daily (before meals) Blood sugar check before meals and at bedtime before meals and at bedtime for Diabetes        docusate sodium 100 MG capsule    COLACE    60 capsule    Take 1 capsule (100 mg) by mouth 2 times daily as needed for constipation    Slow transit constipation       ertapenem 1 GM injection    INVanz    7 each    Inject 0.5 g into the vein every 24 hours    Urinary tract infection associated with indwelling urethral catheter, subsequent encounter       ferrous sulfate 325 (65 FE) MG tablet    IRON     Take 325 mg by mouth daily (with breakfast)        FLOMAX 0.4 MG capsule   Generic drug:  tamsulosin      Take 0.4 mg by mouth At Bedtime        FREESTYLE LITE test strip   Generic drug:   blood glucose monitoring     200 strip    USE 1 STRIP TO TEST TWICE DAILY.    Uncontrolled type 1 diabetes mellitus with stage 3 chronic kidney disease (H)       furosemide 20 MG tablet    LASIX    90 tablet    Take 1 tablet (20 mg) by mouth daily    Multiple myeloma not having achieved remission (H)       gemfibrozil 600 MG tablet    LOPID    180 tablet    TAKE 1 TABLET BY MOUTH TWICE DAILY    Hyperlipidemia       heparin 100 UNIT/ML Soln injection      by Intracatheter route every 8 hours Saline Flush Solution (Sodium Chloride Flush) Use 10 ml intravenously every shift for IV Maintance IV site: Left forearm -Order Date        * HUMALOG SC      Inject 6 Units Subcutaneous Before breakfast.        * HUMALOG SC      Inject 4 Units Subcutaneous Inject 4 unit subcutaneously one time a day for Diabetes  Units/mL before dinner, and bedtime.Inject 4 unit subcutaneously one time a day for Diabetes  Units/mL before dinner, and bedtime.        * HUMALOG SC      Inject 5 Units Subcutaneous daily (before lunch)        * HUMALOG SC      Inject as per sliding scale:if 140 - 175 = 1 unit,176 - 210 = 2 units;211 - 245 = 3 units;246 - 280 = 4 units;281 - 315 = 5 units;316 - 350 = 6 units;351 - 385 = 7 units;386 - 420 = 8 units;421+ = 9 units,subcutaneouslybefore meals for Diabetes II        insulin detemir 100 UNIT/ML injection    LEVEMIR FLEXPEN/FLEXTOUCH    15 mL    Inject 16 Units Subcutaneous every morning (before breakfast)    Type 2 diabetes mellitus with diabetic chronic kidney disease, unspecified CKD stage, unspecified long term insulin use status (H), Uncontrolled type 2 diabetes mellitus with hyperglycemia, unspecified long term insulin use status (H), Type 2 diabetes mellitus with stage 3 chronic kidney disease, with long-term current use of insulin (H)       levothyroxine 25 MCG tablet    SYNTHROID/LEVOTHROID    90 tablet    TAKE 1 TABLET BY MOUTH EVERY DAY    Acquired hypothyroidism       LISINOPRIL PO       Take 15 mg by mouth daily        metoprolol tartrate 25 MG tablet    LOPRESSOR    180 tablet    TAKE 1 TABLET BY MOUTH TWICE DAILY    Essential hypertension       omeprazole 20 MG CR capsule    priLOSEC    90 capsule    TAKE 1 CAPSULE BY MOUTH EVERY DAY    Congenital hiatus hernia       ondansetron 8 MG tablet    ZOFRAN    10 tablet    Take 1 tablet (8 mg) by mouth every 8 hours as needed (Nausea/Vomiting)    Multiple myeloma not having achieved remission (H)       polyethylene glycol Packet    MIRALAX/GLYCOLAX    7 packet    Take 17 g by mouth 2 times daily as needed (constipation)    Slow transit constipation       Selenium 200 MCG Tabs      Take 100 mcg by mouth daily. Pt takes one half tab of the 200 mcg daily        VITAMIN C PO      Take 1,000 mg by mouth daily        VITAMIN D3 PO      Take 1,000 Units by mouth daily        * Notice:  This list has 6 medication(s) that are the same as other medications prescribed for you. Read the directions carefully, and ask your doctor or other care provider to review them with you.

## 2018-01-31 NOTE — PROGRESS NOTES
Medical Assistant Note:  Roc Parkinson presents today for yes  Patient seen by provider today: Yes: Dr Quinn.   present during visit today: Not Applicable.    Concerns: No Concerns.    Procedure:  Lab draw site: LAC, Needle type: BF, Gauge: 21. Dalila and coban applied     Post Assessment:  Labs drawn without difficulty: Yes.    Discharge Plan:  Departure Mode: Ambulatory.    Face to Face Time: 5.    Winsome Diaz MA

## 2018-01-31 NOTE — PROGRESS NOTES
Infusion Nursing Note:  Roc Parkinson presents today for aranesp.    Patient seen by provider today: Yes: Dr. Quinn   present during visit today: Not Applicable.    Note: Per Jeanie Padgett RN/Dr. Quinn, give only aranesp today. Kyprolis is currently on hold and will likely resume next week when patient is discharged from [facility]. Clinic staff to update treatment plan.    Intravenous Access:  No Intravenous access/labs at this visit.    Treatment Conditions:  Lab Results   Component Value Date    HGB 8.6 01/31/2018     Lab Results   Component Value Date    WBC 3.0 01/31/2018      Lab Results   Component Value Date    ANEU 2.3 01/31/2018     Lab Results   Component Value Date     01/31/2018      Lab Results   Component Value Date     01/31/2018                   Lab Results   Component Value Date    POTASSIUM 4.1 01/31/2018           Lab Results   Component Value Date    CR 1.12 01/31/2018                   Lab Results   Component Value Date    MICHELLE 8.7 01/31/2018                Lab Results   Component Value Date    BILITOTAL 0.4 01/31/2018           Lab Results   Component Value Date    ALBUMIN 2.7 01/31/2018                    Lab Results   Component Value Date    ALT 14 01/31/2018           Lab Results   Component Value Date    AST 11 01/31/2018       Results reviewed, labs MET treatment parameters for aranesp, ok to proceed with treatment.  Results reviewed, labs did NOT meet treatment parameters for PRBCs.      Post Infusion Assessment:  Patient tolerated injection without incident. Aranesp given SQ in left upper arm.  Site patent and intact, free from redness, edema or discomfort.    Discharge Plan:   Discharge instructions reviewed with: Patient.  Patient and/or family verbalized understanding of discharge instructions and all questions answered.  Copy of AVS given in clinic.  Patient will return 2/7 for next appointment.  Patient discharged in stable condition accompanied  by: family.  Departure Mode: Ambulatory with walker.    Eliana Carpenter RN

## 2018-01-31 NOTE — PROGRESS NOTES
"Oncology Rooming Note    January 31, 2018 10:58 AM   Roc Parkinson is a 89 year old male who presents for:    Chief Complaint   Patient presents with     Oncology Clinic Visit     Initial Vitals: /68 (BP Location: Left arm, Patient Position: Chair, Cuff Size: Adult Regular)  Pulse 83  Temp 97.8  F (36.6  C) (Oral)  Resp 18  Wt 92.9 kg (204 lb 12.8 oz)  SpO2 95%  BMI 32.08 kg/m2 Estimated body mass index is 32.08 kg/(m^2) as calculated from the following:    Height as of 1/29/18: 1.702 m (5' 7\").    Weight as of this encounter: 92.9 kg (204 lb 12.8 oz). Body surface area is 2.1 meters squared.  No Pain (0) Comment: Data Unavailable   No LMP for male patient.  Allergies reviewed: Yes  Medications reviewed: Yes    Medications: Medication refills not needed today.  Pharmacy name entered into [employer]:    [facility] [place], [place] - [address] [place] [address] DRUG STORE [address] [place] [address] [address] AT [address]    Clinical concerns: None     5 minutes for nursing intake (face to face time)     Diana Mosley CMA              "

## 2018-01-31 NOTE — MR AVS SNAPSHOT
After Visit Summary   1/31/2018    Roc Parkinson    MRN: [id number]           Patient Information     Date Of Birth          7/7/1926        Visit Information        Provider Department      1/31/2018 11:45 AM Gretel Quinn MD [facility]        Today's Diagnoses     Multiple myeloma not having achieved remission (H)    -  1      Care Instructions    1.  Continue Carfilzomib/Dexamethasone (dose of Carfilzomib reduced by 20%).   2.  Zometa every 12 weeks  3. Continue Aranesp 300 mcg SQ every three weeks  4- RTC MD 4 weeks  5- Continue Acyclovir    Please hold treatment for 1 week during TCU stay at [facility], [facility] Samuels per Dr. Quinn          Follow-ups after your visit        Your next 10 appointments already scheduled     Feb 07, 2018 11:00 AM CST   Level 2 with SH INFUSION CHAIR 8   [facility] ([facility])    [facility] [place] [id number]  [place] [address]   [phone]            Feb 08, 2018  9:30 AM CST   Level 2 with SH INFUSION CHAIR 14   [facility] ([facility])    [facility]  [address]  [place] [address]   [phone]            Feb 08, 2018 10:00 AM CST   Return Visit with Gretel Quinn MD   [facility] ([facility])    [facility]  [address]  [place] [address]   [phone]            Feb 14, 2018 11:30 AM CST   Level 2 with SH INFUSION CHAIR 12   [facility] ([facility])    [facility]  [address]  [place] [address]   [phone]            Feb 15, 2018 11:00 AM CST   Level 2 with SH INFUSION CHAIR 20   [facility] ([facility])    [facility]  [address]  [place]  "[phone]   [phone]            Feb 21, 2018 11:00 AM CST   Level 2 with SH INFUSION CHAIR 20   [facility] ([facility])    [facility] [place]  [address]  [place] [address]   [phone]            Feb 21, 2018 11:30 AM CST   Return Visit with Gretel Quinn MD   [facility] ([facility])    [facility] [place]  [address]  [place] [address]   [phone]            Feb 22, 2018 11:00 AM CST   Level 2 with SH INFUSION CHAIR 15   [facility] ([facility])    [facility]  [address]  [place] [address]   [phone]              Who to contact     If you have questions or need follow up information about today's clinic visit or your schedule please contact [facility] directly at [phone].  Normal or non-critical lab and imaging results will be communicated to you by [employer], letter or phone within 4 business days after the clinic has received the results. If you do not hear from us within 7 days, please contact the clinic through [employer] or phone. If you have a critical or abnormal lab result, we will notify you by phone as soon as possible.  Submit refill requests through [employer] or call your pharmacy and they will forward the refill request to us. Please allow 3 business days for your refill to be completed.          Additional Information About Your Visit        MyChart Information     [employer] lets you send messages to your doctor, view your test results, renew your prescriptions, schedule appointments and more. To sign up, go to [email or web address] . Click on \"Log in\" on the left side of the screen, which will take you to the Welcome page. Then click on \"Sign up Now\" on the right side of the page.     You will be asked to enter the access code listed below, as well as some " personal information. Please follow the directions to create your username and password.     Your access code is: [email or web address]  Expires: 3/26/2018 11:10 AM     Your access code will  in 90 days. If you need help or a new code, please call your [place] clinic or [phone].        Care EveryWhere ID     This is your Care EveryWhere ID. This could be used by other organizations to access your [place] medical records  [id number]        Your Vitals Were     Pulse Temperature Respirations Pulse Oximetry BMI (Body Mass Index)       83 97.8  F (36.6  C) (Oral) 18 95% 32.08 kg/m2        Blood Pressure from Last 3 Encounters:   18 119/68   18 148/82   18 170/84    Weight from Last 3 Encounters:   18 91.6 kg (202 lb)   18 92.9 kg (204 lb 12.8 oz)   18 91.9 kg (202 lb 9.6 oz)              Today, you had the following     No orders found for display         Today's Medication Changes          These changes are accurate as of 18 11:59 PM.  If you have any questions, ask your nurse or doctor.               These medicines have changed or have updated prescriptions.        Dose/Directions    ertapenem 1 GM injection   Commonly known as:  INVanz   This may have changed:  additional instructions   Used for:  Urinary tract infection associated with indwelling urethral catheter, subsequent encounter        Dose:  0.5 g   Inject 0.5 g into the vein every 24 hours   Quantity:  7 each   Refills:  0                Primary Care Provider Office Phone # Fax #    Dickson Reich [phone] [address] [facility] [address]        Equal Access to Services     SANDRA GAGNON : Hadii virginia Driver, waaxda luqadaha, qaybta kaalmada melita thomason. So [facility] [phone].    ATENCIÓN: Si habla español, tiene a nolan disposición servicios gratuitos de asistencia lingüística. Llame al [phone].    We comply with  applicable federal civil rights laws and [place] laws. We do not discriminate on the basis of race, color, national origin, age, disability, sex, sexual orientation, or gender identity.            Thank you!     Thank you for choosing [facility]  for your care. Our goal is always to provide you with excellent care. Hearing back from our patients is one way we can continue to improve our services. Please take a few minutes to complete the written survey that you may receive in the mail after your visit with us. Thank you!             Your Updated Medication List - Protect others around you: Learn how to safely use, store and throw away your medicines at www.disposemymeds.org.          This list is accurate as of 1/31/18 11:59 PM.  Always use your most recent med list.                   Brand Name Dispense Instructions for use Diagnosis    acyclovir 400 MG tablet    ZOVIRAX    60 tablet    Take 1 tablet (400 mg) by mouth 2 times daily Viral Prophylaxis.    Multiple myeloma not having achieved remission (H)       amLODIPine 5 MG tablet    NORVASC    90 tablet    TAKE 1 TABLET BY MOUTH DAILY    Essential hypertension, benign       * B-D U/F 31G X 8 MM   Generic drug:  insulin pen needle           * B-D U/F 31G X 8 MM   Generic drug:  insulin pen needle     500 each    USE 5 PEN NEEDLES PER DAY OR AS DIRECTED    Type 2 diabetes mellitus with stage 3 chronic kidney disease, with long-term current use of insulin (H)       Blood Glucose Monitoring Suppl Misc      3 times daily (before meals) CALL NP/MD IF  accu-check consistently > 250 ( at least 3 in a row). Blood sugar check before meals and at bedtime before meals and at bedtime for Diabetes        docusate sodium 100 MG capsule    COLACE    60 capsule    Take 1 capsule (100 mg) by mouth 2 times daily as needed for constipation    Slow transit constipation       ertapenem 1 GM injection    INVanz    7 each    Inject 0.5 g into the vein every 24 hours     Urinary tract infection associated with indwelling urethral catheter, subsequent encounter       ferrous sulfate 325 (65 FE) MG tablet    IRON     Take 325 mg by mouth daily (with breakfast)        FLOMAX 0.4 MG capsule   Generic drug:  tamsulosin      Take 0.4 mg by mouth At Bedtime        FREESTYLE LITE test strip   Generic drug:  blood glucose monitoring     200 strip    USE 1 STRIP TO TEST TWICE DAILY.    Uncontrolled type 1 diabetes mellitus with stage 3 chronic kidney disease (H)       furosemide 20 MG tablet    LASIX    90 tablet    Take 1 tablet (20 mg) by mouth daily    Multiple myeloma not having achieved remission (H)       gemfibrozil 600 MG tablet    LOPID    180 tablet    TAKE 1 TABLET BY MOUTH TWICE DAILY    Hyperlipidemia       heparin 100 UNIT/ML Soln injection      by Intracatheter route every 8 hours Saline Flush Solution (Sodium Chloride Flush) Use 10 ml intravenously every shift for IV Maintance IV site: Left forearm -Order Date        * HUMALOG SC      Inject 6 Units Subcutaneous Before breakfast.        * HUMALOG SC      Inject 4 Units Subcutaneous Inject 4 unit subcutaneously one time a day for Diabetes  Units/mL before dinner, and bedtime.Inject 4 unit subcutaneously one time a day for Diabetes  Units/mL before dinner, and bedtime.        * HUMALOG SC      Inject 5 Units Subcutaneous daily (before lunch)        insulin detemir 100 UNIT/ML injection    LEVEMIR FLEXPEN/FLEXTOUCH    15 mL    Inject 16 Units Subcutaneous every morning (before breakfast)    Type 2 diabetes mellitus with diabetic chronic kidney disease, unspecified CKD stage, unspecified long term insulin use status (H), Uncontrolled type 2 diabetes mellitus with hyperglycemia, unspecified long term insulin use status (H), Type 2 diabetes mellitus with stage 3 chronic kidney disease, with long-term current use of insulin (H)       levothyroxine 25 MCG tablet    SYNTHROID/LEVOTHROID    90 tablet    TAKE 1 TABLET BY  MOUTH EVERY DAY    Acquired hypothyroidism       LISINOPRIL PO      Take 15 mg by mouth daily        metoprolol tartrate 25 MG tablet    LOPRESSOR    180 tablet    TAKE 1 TABLET BY MOUTH TWICE DAILY    Essential hypertension       omeprazole 20 MG CR capsule    priLOSEC    90 capsule    TAKE 1 CAPSULE BY MOUTH EVERY DAY    Congenital hiatus hernia       ondansetron 8 MG tablet    ZOFRAN    10 tablet    Take 1 tablet (8 mg) by mouth every 8 hours as needed (Nausea/Vomiting)    Multiple myeloma not having achieved remission (H)       polyethylene glycol Packet    MIRALAX/GLYCOLAX    7 packet    Take 17 g by mouth 2 times daily as needed (constipation)    Slow transit constipation       Selenium 200 MCG Tabs      Take 100 mcg by mouth daily. Pt takes one half tab of the 200 mcg daily        VITAMIN C PO      Take 1,000 mg by mouth daily        VITAMIN D3 PO      Take 1,000 Units by mouth daily        * Notice:  This list has 5 medication(s) that are the same as other medications prescribed for you. Read the directions carefully, and ask your doctor or other care provider to review them with you.

## 2018-02-01 NOTE — PROGRESS NOTES
[facility]    Chief Complaint   Patient presents with     Nursing Home Acute       HPI:    Roc Parkinson is a 89 year old  (7/7/1926), who is being seen today for an episodic care visit at [facility].  HPI information obtained from: facility chart records, facility staff, patient report and [facility] chart review.Today's concern is: does feels a bit stronger. He has no complaints--feels stronger, wants to go home soon.     Physical deconditioning  UTI due to extended-spectrum beta lactamase (ESBL) producing Escherichia coli  Essential hypertension  Type 2 diabetes mellitus without complication, with long-term current use of insulin(H): running mostly mid 100's , occas higher or lower  Multiple myeloma not having achieved remission (H)--saw Oncology ([facility]) yest--no chemo  MDS (myelodysplastic syndrome) (H)  Malignant neoplasm of prostate (H)     No CP, SOB, Cough, dizziness, fevers, chills, HA, N/V, dysuria or Bowel Abnormalities. Appetite is down.  No pains    ALLERGIES: Review of patient's allergies indicates no known allergies.  Past Medical, Surgical, Family and Social History reviewed and updated in [facility].    Current Outpatient Prescriptions   Medication Sig Dispense Refill     VITAMIN D, CHOLECALCIFEROL, PO Take 1,000 Units by mouth       metoprolol tartrate (LOPRESSOR) 25 MG tablet TAKE 1 TABLET BY MOUTH TWICE DAILY 180 tablet 0     tamsulosin (FLOMAX) 0.4 MG capsule Take 0.4 mg by mouth At Bedtime       Blood Glucose Monitoring Suppl MISC 3 times daily (before meals) CALL NP/MD IF  accu-check consistently > 250 ( at least 3 in a row). Blood sugar check  before meals and at  bedtime  before meals and at  bedtime for  Diabetes       heparin 100 UNIT/ML SOLN injection by Intracatheter route every 8 hours Saline Flush  Solution (Sodium  Chloride Flush)  Use 10 ml  intravenously every  shift for IV  Maintance IV site:  Left forearm  -Order Date       Insulin Lispro  (HUMALOG SC) Inject 6 Units Subcutaneous Before breakfast.       Insulin Lispro (HUMALOG SC) Inject 4 Units Subcutaneous Inject 4 unit subcutaneously one time a day for Diabetes  Units/mL before dinner, and bedtime.Inject 4 unit subcutaneously one time a day for Diabetes  Units/mL before dinner, and bedtime.       Insulin Lispro (HUMALOG SC) Inject 5 Units Subcutaneous daily (before lunch)       polyethylene glycol (MIRALAX/GLYCOLAX) Packet Take 17 g by mouth 2 times daily as needed (constipation) 7 packet      insulin detemir (LEVEMIR FLEXPEN/FLEXTOUCH) 100 UNIT/ML injection Inject 16 Units Subcutaneous every morning (before breakfast) 15 mL 1     LISINOPRIL PO Take 15 mg by mouth daily       ertapenem (INVANZ) 1 GM injection Inject 0.5 g into the vein every 24 hours (Patient taking differently: Inject 0.5 g into the vein every 24 hours STOP DATE IS AFTER 2/3/18 DOSE FOR 11 DAYS FROM ADMIT TO TCU) 7 each      furosemide (LASIX) 20 MG tablet Take 1 tablet (20 mg) by mouth daily 90 tablet 1     gemfibrozil (LOPID) 600 MG tablet TAKE 1 TABLET BY MOUTH TWICE DAILY 180 tablet 1     levothyroxine (SYNTHROID/LEVOTHROID) 25 MCG tablet TAKE 1 TABLET BY MOUTH EVERY DAY 90 tablet 0     FREESTYLE LITE test strip USE 1 STRIP TO TEST TWICE DAILY. 200 strip 1     omeprazole (PRILOSEC) 20 MG CR capsule TAKE 1 CAPSULE BY MOUTH EVERY DAY 90 capsule 3     acyclovir (ZOVIRAX) 400 MG tablet Take 1 tablet (400 mg) by mouth 2 times daily Viral Prophylaxis. 60 tablet 3     ondansetron (ZOFRAN) 8 MG tablet Take 1 tablet (8 mg) by mouth every 8 hours as needed (Nausea/Vomiting) 10 tablet 2     Ascorbic Acid (VITAMIN C PO) Take 1,000 mg by mouth daily       ferrous sulfate (IRON) 325 (65 FE) MG tablet Take 325 mg by mouth daily (with breakfast)       Selenium 200 MCG TABS Take 100 mcg by mouth daily. Pt takes one half tab of the 200 mcg daily        [DISCONTINUED] Insulin Lispro (HUMALOG SC) Inject as per sliding  scale:if 140 -  175 = 1 unit,176 - 210 = 2 units;211 - 245 = 3 units;246 - 280 = 4 units;281 - 315 = 5 units;316 - 350 = 6 units;351 - 385 = 7 units;386 - 420 = 8 units;421+ = 9 units,subcutaneouslybefore meals for Diabetes II       amLODIPine (NORVASC) 5 MG tablet TAKE 1 TABLET BY MOUTH DAILY 90 tablet 3     [DISCONTINUED] cyclophosphamide (CYTOXAN) 50 MG capsule CHEMO Take 6 capsules (300 mg) by mouth once a week 24 capsule 0     B-D U/F 31G X 8 MM insulin pen needle USE 5 PEN NEEDLES PER DAY OR AS DIRECTED 500 each 1     B-D U/F 31G X 8 MM insulin pen needle        Medications reviewed:  Medications reconciled to facility chart and changes were made to reflect current medications as identified as above med list. Below are the changes that were made:   Medications stopped since last EPIC medication reconciliation:   There are no discontinued medications.    Medications started since last [facility] medication reconciliation:  No orders of the defined types were placed in this encounter.        REVIEW OF SYSTEMS:  See under HPI above     Physical Exam:  Wt 202 lb (91.6 kg)  BMI 31.64 kg/m2 PHYSICAL    GENERAL:  Alert oriented, in no acute distress.  HEENT:  Oral mucosa is moist, intact, conjunctiva and lids are normal.   RESP:  Normal respiratory effort. Lungs are clear on ascultation. Non labored.  CV:  S1/S2, RRR No murmur, gallops or rubs auscultated. No edema.   PICC site EMA WNL--per staff, I did not see today.  ABDOMEN:  Soft, bowel sounds positive.  No abd discomfort on palpation  : voids, not seen  M/S: No edema. Normal range of motion. No calf swelling or tenderness.    SKIN:  Dry and warm, intact, but limited exam as pt fully dressed.  NEUROLOGIC:  Alert and oriented x3.  Moving all ext, up with assist.  PSYCH: Recent and remote memory is normal. Mood and affect are normal.    BP Readings from Last 3 Encounters:   01/31/18 119/68   01/29/18 148/82   01/25/18 170/84     Recent Labs:   CBC RESULTS:   Recent Labs   Lab Test   01/31/18   1030  01/23/18   0850   WBC  3.0*  4.6   RBC  2.36*  2.32*   HGB  8.6*  8.6*   HCT  26.6*  25.8*   MCV  113*  111*   MCH  36.4*  37.1*   MCHC  32.3  33.3   RDW  18.1*  19.0*   PLT  221  63*       Last Basic Metabolic Panel:  Recent Labs   Lab Test  01/31/18   1030  01/23/18   0850   NA  139  139   POTASSIUM  4.1  4.0   CHLORIDE  105  107   MICHELLE  8.7  9.3   CO2  25  24   BUN  28  24   CR  1.12  0.99   GLC  190*  84       Liver Function Studies -   Recent Labs   Lab Test  01/31/18   1030  01/20/18   0220   PROTTOTAL  7.8  7.3   ALBUMIN  2.7*  2.6*   BILITOTAL  0.4  0.3   ALKPHOS  100  74   AST  11  13   ALT  14  15       TSH   Date Value Ref Range Status   09/28/2016 5.53 (H) 0.40 - 5.00 mU/L Final   10/14/2015 6.95 (H) 0.40 - 5.00 mU/L Final       Lab Results   Component Value Date    A1C 6.3 01/08/2018    A1C 8.0 05/23/2017         ASSESSMENT / PLAN:  (R53.81) Physical deconditioning  (primary encounter diagnosis)  Comment/Plan: PT OT, plan is for dc next week if conts to improve.    (N39.0,  A49.8,  Z16.12) UTI due to extended-spectrum beta lactamase (ESBL) producing Escherichia coli  Comment/Plan: cont Abx thru 2/3 then dc, has PICC, monitor    (I10) Essential hypertension  Comment/Plan: controlled, range is 120-140's SBP mostly, monitor. No changes today    (E11.9,  Z79.4) Type 2 diabetes mellitus without complication, with long-term current use of insulin (H)  Comment/Plan: better, cont to mostly be   in the mid 100's, cont same regimen of short and long acting insulins.    (C90.00) Multiple myeloma not having achieved remission (H)   (D46.9) MDS (myelodysplastic syndrome) (H)   (C61) Malignant neoplasm of prostate (H)  Comment/Plan: cont Flomax, changed to HS dosing earlier this week .      Electronically signed by  ALEXANDER Riojas CNP

## 2018-02-06 NOTE — PROGRESS NOTES
"[facility] DISCHARGE SUMMARY    PATIENT'S NAME: Roc Parkinson  YOB: 1926    PRIMARY CARE PROVIDER AND CLINIC RESPONSIBLE AFTER TRANSFER: Dickson Reich     CODE STATUS: DNR/DNI    TRANSFERRING PROVIDERS: Christel Velasquez, ALEXANDER ANN, Dr. Lane Burroughs,      DATE OF SNF ADMISSION:  January / 23 / 2018    DATE OF SNF DISCHARGE (including anticipating DC): February / 08 / 2018    DISCHARGE DISPOSITION: FMG Provider    Nursing Facility: [facility] stay 1/20/18 to 1/23/18.     Condition on Discharge:  Stable.    Function:  Ambulates 400' w/ FWW, Transfers s- mod 1  Cognitive Scores: SLUMS 20/30 and CPT 5.1/5.6    Equipment: walker    DISCHARGE DIAGNOSIS:      Physical deconditioning  UTI due to extended-spectrum beta lactamase (ESBL) producing Escherichia coli  Essential hypertension  Type 2 diabetes mellitus without complication, with long-term current use of insulin (H)  Multiple myeloma not having achieved remission (H)  MDS (myelodysplastic syndrome) (H)  Malignant neoplasm of prostate (H)        HOSPITAL COURSE:      \"Organized by Discharge Diagnosis  Roc Parkinson was admitted on 1/20/2018.  The following problems were addressed during his hospitalization:      UTI  Patient noted burning with urination, he has a history of E.coli ESBL UTIs.  He remained afebrile, however, is immunocompromised with chemotherapy.   - On 01/22/18, UCx growing 2 strains ESBL  - Continue meropenem. Discussed with ID at discharge and they recommended change to ertapenem for E.coli ESBL and to use low dose at 500 mg.   - Should have CBC, AST and Cr for med monitoring on Monday, 1/29.       Acute kidney injury, RESOLVED ON 01/21/18, on chronic kidney disease, stage III.  Baseline creatinine is 0.9 to 1.1.  His admission creatinine was 1.45 with a BUN of 45.  The patient endorses reduced fluid intake over the past couple of weeks. " Received IVF through 1/21.      Recent Labs  Lab 01/23/18  0850 01/22/18  0837 01/21/18  0834 01/20/18  0220   CR 0.99 1.01 1.02 1.45*         H/O multiple myeloma, myelodysplastic syndrome  H/O prostate cancer, and superficial bladder cancer.    The patient follows with Dr. Quinn of Oncology.  He is actively undergoing chemotherapy, most recent session was last week.  He is found to have pancytopenia secondary to his chemotherapy, ANC 1.6 at admission.   - Neutropenia precautions.   - Appreciate oncology consult, plan to reduce dose of future chemo. No growth factor given.   - Continue PTA acyclovir, for herpes infection prophylaxis while on chemotherapy.   - Transfuse pRBC for Hgb < 7, plts, plts < 10,000. Of course, defer to oncologist, if they recommend differently.   - Holding dexamethasone and chemotherapy until follow up with oncology.      Recent Labs  Lab 01/23/18  0850 01/22/18  0837 01/21/18  0834 01/20/18  0220 01/17/18  1025   PLT 63* 40* 36* 70* 92*         Hypertensive urgency with chest pain, suspect chest wall pain from fall.  The patient noted to have blood pressures up to 191/94 and possible correlation with chest pain when his blood pressures are elevated.  He had brief episode of SVT in the 120's His initial EKG shows a new right bundle branch block.  Troponin are negative.  He recently had an echocardiogram on 01/11/2018 showing a normal LV function, no wall motion changes, no pericardial effusion, and impaired LV relaxation.  He has been compliant with his antihypertensive medications.  He has reproducible right-sided chest discomfort to palpation, and suspect the current chest pain is from recent fall. X-ray negative  - Continues on PTA Norvasc, lisinopril, metoprolol, lasix. BP better controlled.   Brief episodes of self-limiting SVT - Patient states he has a remote history of Afib   - On 01/21/18 tele reviewed, one brief episode of regular SVT at 117 (asymptomatic), O/w NSR.      Generalized  "weakness secondary to above  - PT ordered recommended continued PT and will be going to TCU.      DM 2, most recent A1c was 6.3 on 01/08/2018.   - Held PTA Glucotrol.   - Resumed PTA Levemir 16 units daily in the morning.   - Resume scheduled meal time insulin at half PTA doses > increase back to PTA doses of 13,11, 9 units on 1/22  - Hypoglycemia on 01/23/18. Mid-day BS down to 60's.   - Decreased mealtime Novolog at discharge back to 6, 5 and 4 units prior to meals with sliding scale insulin.   - Continue to hold PTA glucotrol.       BPH,   - Continue PTA Flomax.       Hypothyroidism, continue PTA levothyroxine\"           No CP, SOB, Cough, dizziness, fevers, chills, HA, N/V, dysuria or Bowel Abnormalities. Appetite is good.  No pain.    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       DISCHARGE MEDICATIONS:  Current Outpatient Prescriptions   Medication Sig Dispense Refill     INSULIN ASPART SC Inject Subcutaneous every morning Inject 6 unit subcutaneously one time a day for Diabetes II 100unit/mL before breakfast       INSULIN ASPART SC Inject Subcutaneous daily (before lunch) Inject 5 unit subcutaneously one time a day for Diabetes II before lunch       INSULIN ASPART SC Inject Subcutaneous At Bedtime Inject 4 unit subcutaneously at bedtime for Diabetes II Ok to use Humalog insulin with same order details       VITAMIN D, CHOLECALCIFEROL, PO Take 1,000 Units by mouth       metoprolol tartrate (LOPRESSOR) 25 MG tablet TAKE 1 TABLET BY MOUTH TWICE DAILY 180 tablet 0     tamsulosin (FLOMAX) 0.4 MG capsule Take 0.4 mg by mouth At Bedtime       Blood Glucose Monitoring Suppl MISC 3 times daily (before meals)        polyethylene glycol (MIRALAX/GLYCOLAX) Packet Take 17 " g by mouth 2 times daily as needed (constipation) 7 packet      insulin detemir (LEVEMIR FLEXPEN/FLEXTOUCH) 100 UNIT/ML injection Inject 16 Units Subcutaneous every morning (before breakfast) 15 mL 1     LISINOPRIL PO Take 15 mg by mouth daily       amLODIPine (NORVASC) 5 MG tablet TAKE 1 TABLET BY MOUTH DAILY 90 tablet 3     furosemide (LASIX) 20 MG tablet Take 1 tablet (20 mg) by mouth daily 90 tablet 1     gemfibrozil (LOPID) 600 MG tablet TAKE 1 TABLET BY MOUTH TWICE DAILY 180 tablet 1     B-D U/F 31G X 8 MM insulin pen needle USE 5 PEN NEEDLES PER DAY OR AS DIRECTED 500 each 1     FREESTYLE LITE test strip USE 1 STRIP TO TEST TWICE DAILY. 200 strip 1     omeprazole (PRILOSEC) 20 MG CR capsule TAKE 1 CAPSULE BY MOUTH EVERY DAY 90 capsule 3     B-D U/F 31G X 8 MM insulin pen needle        acyclovir (ZOVIRAX) 400 MG tablet Take 1 tablet (400 mg) by mouth 2 times daily Viral Prophylaxis. 60 tablet 3     ondansetron (ZOFRAN) 8 MG tablet Take 1 tablet (8 mg) by mouth every 8 hours as needed (Nausea/Vomiting) 10 tablet 2     Ascorbic Acid (VITAMIN C PO) Take 1,000 mg by mouth daily       ferrous sulfate (IRON) 325 (65 FE) MG tablet Take 325 mg by mouth daily (with breakfast)       Selenium 200 MCG TABS Take 100 mcg by mouth daily. Pt takes one half tab of the 200 mcg daily        levothyroxine (SYNTHROID/LEVOTHROID) 25 MCG tablet TAKE 1 TABLET BY MOUTH EVERY DAY 90 tablet 0     dexamethasone (DECADRON) 4 MG tablet Take 5 tablets (20 mg) by mouth with food. Days 1, 2, 8, 9, 15, 16, 22, and 23. On carfilzomib days, take prior to carfilzomib dose. 40 tablet 0     [DISCONTINUED] cyclophosphamide (CYTOXAN) 50 MG capsule CHEMO Take 6 capsules (300 mg) by mouth once a week 24 capsule 0       MEDICATION CHANGES/RATIONALE:   none    TCU/SNF COURSE: pt has progressed with PT and OT. Off Abx, midline dc'd. Accuchecks have been variable from mid 100's to mid 200's mostly and now he is started on Decadron so may need adjustment in  "insulin regimen.  He restarted Chemo today and will have chemo tomorrow also after discharge    ROS: see under HPI above    /65  Pulse 70  Temp 97  F (36.1  C)  Resp 18  Ht 5' 7\" (1.702 m)  Wt 199 lb 12.8 oz (90.6 kg)  SpO2 95%  BMI 31.29 kg/m2    PHYSICAL EXAM Today:  GENERAL:  Alert oriented x 3 , in no acute distress.  HEENT:  Oral mucosa is moist, intact, conjunctiva and lids are normal.   RESP:  Normal respiratory effort. Lungs are clear on ascultation. Non labored.  CV:  S1/S2, RRR No murmur, gallops or rubs auscultated. No edema.      ABDOMEN:  Soft, bowel sounds positive.  No abd discomfort on palpation  : voids, not seen  SKIN:  Dry and warm, intact, but limited exam as pt fully dressed.  M/S/NEUROLOGIC:  Alert and oriented x3.  Moving all ext, up independently  PSYCH: Recent and remote memory is normal. Mood and affect are normal.     BP Readings from Last 3 Encounters:   02/07/18 149/71   02/07/18 116/65   01/31/18 119/68       SNF LABS:   CBC RESULTS:   Recent Labs   Lab Test  01/31/18   1030  01/23/18   0850   WBC  3.0*  4.6   RBC  2.36*  2.32*   HGB  8.6*  8.6*   HCT  26.6*  25.8*   MCV  113*  111*   MCH  36.4*  37.1*   MCHC  32.3  33.3   RDW  18.1*  19.0*   PLT  221  63*       Last Basic Metabolic Panel:  Recent Labs   Lab Test  01/31/18   1030  01/23/18   0850   NA  139  139   POTASSIUM  4.1  4.0   CHLORIDE  105  107   MICHELLE  8.7  9.3   CO2  25  24   BUN  28  24   CR  1.12  0.99   GLC  190*  84       Liver Function Studies -   Recent Labs   Lab Test  01/31/18   1030  01/20/18   0220   PROTTOTAL  7.8  7.3   ALBUMIN  2.7*  2.6*   BILITOTAL  0.4  0.3   ALKPHOS  100  74   AST  11  13   ALT  14  15         Lab Results   Component Value Date    A1C 6.3 01/08/2018    A1C 8.0 05/23/2017           DISCHARGE PLAN:  From:  [facility] , does not want [facility]HA.     Follow-up with PCP in 7 days: 7 days.    Current Corky or other scheduled appointments:Future Appointments  Date Time Provider " Department Center   2/7/2018 11:00 AM  INFUSION CHAIR 8 [facility]   2/8/2018 9:30 AM  INFUSION CHAIR 14 [facility]   2/8/2018 10:00 AM Gretel Quinn MD [facility]   2/14/2018 11:30 AM  INFUSION CHAIR 12 [facility]   2/15/2018 11:00 AM  INFUSION CHAIR 20 [facility]   2/21/2018 11:00 AM  INFUSION CHAIR 20 [facility]   2/21/2018 11:30 AM Gretel Quinn MD [facility]   2/22/2018 11:00 AM  INFUSION CHAIR 15 [facility]       MTM referral needed and placed by this provider: none    Pending labs: none except per oncology     Discharge Treatments:none       TOTAL DISCHARGE TIME:   Greater than 30 minutes    ALEXANDER Riojas CNP

## 2018-02-07 NOTE — Clinical Note
HI could your staff see this pt next week sometime?  He goes home tomorrow, is getting chemo and is on decadron.  I think he may need insulin adjustments?? Thanks, Christel Velasquez RN , CNP [facility]

## 2018-02-07 NOTE — MR AVS SNAPSHOT
After Visit Summary   2/7/2018    Roc Parkinson    MRN: [id number]           Patient Information     Date Of Birth          7/7/1926        Visit Information        Provider Department      2/7/2018 11:00 AM SH INFUSION CHAIR 8 [facility]        Today's Diagnoses     Multiple myeloma not having achieved remission (H)    -  1       Follow-ups after your visit        Your next 10 appointments already scheduled     Feb 08, 2018  9:30 AM CST   Level 2 with SH INFUSION CHAIR 14   [facility] ([facility])    [facility]  [address]  [place] [address]   [phone]            Feb 08, 2018 10:00 AM CST   Return Visit with Gretel Quinn MD   [facility] ([facility])    [facility]  [address]  [place] [address]   [phone]            Feb 14, 2018 11:30 AM CST   Level 2 with SH INFUSION CHAIR 12   [facility] ([facility])    [facility]  [address]  [place] [address]   [phone]            Feb 15, 2018 11:00 AM CST   Level 2 with SH INFUSION CHAIR 20   [facility] ([facility])    [facility]  [address]  [place] [address]   [phone]            Feb 21, 2018 11:00 AM CST   Level 2 with SH INFUSION CHAIR 20   [facility] ([facility])    [facility]  [address]  [place] [address]   [phone]            Feb 21, 2018 11:30 AM CST   Return Visit with Gretel Quinn MD   [facility] ([facility])    [facility]  [address]  [place] [address]   [phone]            Feb 22, 2018 11:00 AM CST   Level 2 with SH  "INFUSION CHAIR 15   [facility] ([facility])    [facility] [place]  [address]  [place] [address] [phone]              Who to contact     If you have questions or need follow up information about today's clinic visit or your schedule please contact [facility] directly at [phone].  Normal or non-critical lab and imaging results will be communicated to you by [employer], letter or phone within 4 business days after the clinic has received the results. If you do not hear from us within 7 days, please contact the clinic through "[employer]t or phone. If you have a critical or abnormal lab result, we will notify you by phone as soon as possible.  Submit refill requests through [employer] or call your pharmacy and they will forward the refill request to us. Please allow 3 business days for your refill to be completed.          Additional Information About Your Visit        [employer] Information     [employer] lets you send messages to your doctor, view your test results, renew your prescriptions, schedule appointments and more. To sign up, go to [email or web address] . Click on \"Log in\" on the left side of the screen, which will take you to the Welcome page. Then click on \"Sign up Now\" on the right side of the page.     You will be asked to enter the access code listed below, as well as some personal information. Please follow the directions to create your username and password.     Your access code is: [email or web address]  Expires: 3/26/2018 11:10 AM     Your access code will  in 90 days. If you need help or a new code, please call your [place] clinic or [phone].        Care EveryWhere ID     This is your Care EveryWhere ID. This could be used by other organizations to access your [place] medical records  [id number]        Your Vitals Were     Pulse Temperature Respirations Height Pulse Oximetry BMI (Body " "Mass Index)    70 97.8  F (36.6  C) (Oral) 16 1.778 m (5' 10\") 97% 29.44 kg/m2       Blood Pressure from Last 3 Encounters:   02/07/18 149/71   02/07/18 116/65   01/31/18 119/68    Weight from Last 3 Encounters:   02/07/18 93.1 kg (205 lb 3.2 oz)   02/07/18 90.6 kg (199 lb 12.8 oz)   02/01/18 91.6 kg (202 lb)              We Performed the Following     CBC with platelets differential     Comprehensive metabolic panel     Kappa and lambda light chain     Protein electrophoresis     Protein Immunofixation Serum          Today's Medication Changes          These changes are accurate as of 2/7/18 12:58 PM.  If you have any questions, ask your nurse or doctor.               Start taking these medicines.        Dose/Directions    dexamethasone 4 MG tablet   Commonly known as:  DECADRON   Used for:  Multiple myeloma not having achieved remission (H)        Take 5 tablets (20 mg) by mouth with food. Days 1, 2, 8, 9, 15, 16, 22, and 23. On carfilzomib days, take prior to carfilzomib dose.   Quantity:  40 tablet   Refills:  0            Where to get your medicines      These medications were sent to [facility] [address] [facility] [address] [facility] [address]  [address] [facility] [address]    Hours:  24-hours Phone:  [phone]     dexamethasone 4 MG tablet                Primary Care Provider Office Phone # Fax #    Dickson Reich [phone] [address]  [facility] [address]        Equal Access to Services     ELIE GAGNON AH: Hadjuliette Driver, waaxda luqjohn, qaybta kaalmamelita gimenez. So [facility] [phone].    ATENCIÓN: Si habla español, tiene a nolan disposición servicios gratuitos de asistencia lingüística. Eric al [phone].    We comply with applicable federal civil rights laws and [place] laws. We do not discriminate on the basis of race, color, national origin, age, disability, " sex, sexual orientation, or gender identity.            Thank you!     Thank you for choosing [facility]  for your care. Our goal is always to provide you with excellent care. Hearing back from our patients is one way we can continue to improve our services. Please take a few minutes to complete the written survey that you may receive in the mail after your visit with us. Thank you!             Your Updated Medication List - Protect others around you: Learn how to safely use, store and throw away your medicines at www.disposemymeds.org.          This list is accurate as of 2/7/18 12:58 PM.  Always use your most recent med list.                   Brand Name Dispense Instructions for use Diagnosis    acyclovir 400 MG tablet    ZOVIRAX    60 tablet    Take 1 tablet (400 mg) by mouth 2 times daily Viral Prophylaxis.    Multiple myeloma not having achieved remission (H)       amLODIPine 5 MG tablet    NORVASC    90 tablet    TAKE 1 TABLET BY MOUTH DAILY    Essential hypertension, benign       * B-D U/F 31G X 8 MM   Generic drug:  insulin pen needle           * B-D U/F 31G X 8 MM   Generic drug:  insulin pen needle     500 each    USE 5 PEN NEEDLES PER DAY OR AS DIRECTED    Type 2 diabetes mellitus with stage 3 chronic kidney disease, with long-term current use of insulin (H)       Blood Glucose Monitoring Suppl Misc      3 times daily (before meals) CALL NP/MD IF  accu-check consistently > 250 ( at least 3 in a row). Blood sugar check before meals and at bedtime before meals and at bedtime for Diabetes        dexamethasone 4 MG tablet    DECADRON    40 tablet    Take 5 tablets (20 mg) by mouth with food. Days 1, 2, 8, 9, 15, 16, 22, and 23. On carfilzomib days, take prior to carfilzomib dose.    Multiple myeloma not having achieved remission (H)       ferrous sulfate 325 (65 FE) MG tablet    IRON     Take 325 mg by mouth daily (with breakfast)        FLOMAX 0.4 MG capsule   Generic drug:   tamsulosin      Take 0.4 mg by mouth At Bedtime        FREESTYLE LITE test strip   Generic drug:  blood glucose monitoring     200 strip    USE 1 STRIP TO TEST TWICE DAILY.    Uncontrolled type 1 diabetes mellitus with stage 3 chronic kidney disease (H)       furosemide 20 MG tablet    LASIX    90 tablet    Take 1 tablet (20 mg) by mouth daily    Multiple myeloma not having achieved remission (H)       gemfibrozil 600 MG tablet    LOPID    180 tablet    TAKE 1 TABLET BY MOUTH TWICE DAILY    Hyperlipidemia       * INSULIN ASPART SC      Inject Subcutaneous every morning Inject 6 unit subcutaneously one time a day for Diabetes II 100unit/mL before breakfast        * INSULIN ASPART SC      Inject Subcutaneous daily (before lunch) Inject 5 unit subcutaneously one time a day for Diabetes II before lunch        * INSULIN ASPART SC      Inject Subcutaneous At Bedtime Inject 4 unit subcutaneously at bedtime for Diabetes II Ok to use Humalog insulin with same order details        insulin detemir 100 UNIT/ML injection    LEVEMIR FLEXPEN/FLEXTOUCH    15 mL    Inject 16 Units Subcutaneous every morning (before breakfast)    Type 2 diabetes mellitus with diabetic chronic kidney disease, unspecified CKD stage, unspecified long term insulin use status (H), Uncontrolled type 2 diabetes mellitus with hyperglycemia, unspecified long term insulin use status (H), Type 2 diabetes mellitus with stage 3 chronic kidney disease, with long-term current use of insulin (H)       levothyroxine 25 MCG tablet    SYNTHROID/LEVOTHROID    90 tablet    TAKE 1 TABLET BY MOUTH EVERY DAY    Acquired hypothyroidism       LISINOPRIL PO      Take 15 mg by mouth daily        metoprolol tartrate 25 MG tablet    LOPRESSOR    180 tablet    TAKE 1 TABLET BY MOUTH TWICE DAILY    Essential hypertension       omeprazole 20 MG CR capsule    priLOSEC    90 capsule    TAKE 1 CAPSULE BY MOUTH EVERY DAY    Congenital hiatus hernia       ondansetron 8 MG tablet     ZOFRAN    10 tablet    Take 1 tablet (8 mg) by mouth every 8 hours as needed (Nausea/Vomiting)    Multiple myeloma not having achieved remission (H)       polyethylene glycol Packet    MIRALAX/GLYCOLAX    7 packet    Take 17 g by mouth 2 times daily as needed (constipation)    Slow transit constipation       Selenium 200 MCG Tabs      Take 100 mcg by mouth daily. Pt takes one half tab of the 200 mcg daily        VITAMIN C PO      Take 1,000 mg by mouth daily        VITAMIN D (CHOLECALCIFEROL) PO      Take 1,000 Units by mouth        * Notice:  This list has 5 medication(s) that are the same as other medications prescribed for you. Read the directions carefully, and ask your doctor or other care provider to review them with you.

## 2018-02-07 NOTE — PATIENT INSTRUCTIONS
[facility] Discharge Orders    Name: Roc Parkinson  : 1926  Planned Discharge Date: 18  Discharged to: previous independent home    MEDICAL FOLLOW UP  Follow up with PCP in 1-2 weeks pt to make appt  Follow up with Specialists see below  Next 5 appointments (look out 90 days)     2018 10:00 AM CST   Return Visit with Gretel Quinn MD   [facility] ([facility])    [facility] [place] [place]  [address]  [facility] [address]   [phone]            2018 11:30 AM CST   Return Visit with Gretel Quinn MD   [facility] ([facility])    [facility] [place] [place]  [address]  [place] [address]   [phone]                  FUTURE LABS: No labs orders/due except her Oncology at appts    ORDER CHANGES:  Cont all current meds    DISCHARGE MEDICATIONS:  The patient s pharmacy is authorized to dispense a 30-day supply of medications. Refill requests should be directed to the primary provider, Dickson Reich .   No narcotics are prescribed at time of discharge.   Current Outpatient Prescriptions   Medication Sig Dispense Refill     INSULIN ASPART SC Inject Subcutaneous every morning Inject 6 unit subcutaneously one time a day for Diabetes II 100unit/mL before breakfast       INSULIN ASPART SC Inject Subcutaneous daily (before lunch) Inject 5 unit subcutaneously one time a day for Diabetes II before lunch       INSULIN ASPART SC Inject Subcutaneous At Bedtime Inject 4 unit subcutaneously at bedtime for Diabetes II Ok to use Humalog insulin with same order details       VITAMIN D, CHOLECALCIFEROL, PO Take 1,000 Units by mouth       metoprolol tartrate (LOPRESSOR) 25 MG tablet TAKE 1 TABLET BY MOUTH TWICE DAILY 180 tablet 0     tamsulosin (FLOMAX) 0.4 MG capsule Take 0.4 mg by mouth At Bedtime       Blood Glucose Monitoring Suppl MISC 3 times daily (before meals)         polyethylene glycol (MIRALAX/GLYCOLAX) Packet Take 17 g by mouth 2 times daily as needed (constipation) 7 packet      insulin detemir (LEVEMIR FLEXPEN/FLEXTOUCH) 100 UNIT/ML injection Inject 16 Units Subcutaneous every morning (before breakfast) 15 mL 1     LISINOPRIL PO Take 15 mg by mouth daily       amLODIPine (NORVASC) 5 MG tablet TAKE 1 TABLET BY MOUTH DAILY 90 tablet 3     furosemide (LASIX) 20 MG tablet Take 1 tablet (20 mg) by mouth daily 90 tablet 1     gemfibrozil (LOPID) 600 MG tablet TAKE 1 TABLET BY MOUTH TWICE DAILY 180 tablet 1     B-D U/F 31G X 8 MM insulin pen needle USE 5 PEN NEEDLES PER DAY OR AS DIRECTED 500 each 1     FREESTYLE LITE test strip USE 1 STRIP TO TEST TWICE DAILY. 200 strip 1     omeprazole (PRILOSEC) 20 MG CR capsule TAKE 1 CAPSULE BY MOUTH EVERY DAY 90 capsule 3     B-D U/F 31G X 8 MM insulin pen needle        acyclovir (ZOVIRAX) 400 MG tablet Take 1 tablet (400 mg) by mouth 2 times daily Viral Prophylaxis. 60 tablet 3     ondansetron (ZOFRAN) 8 MG tablet Take 1 tablet (8 mg) by mouth every 8 hours as needed (Nausea/Vomiting) 10 tablet 2     Ascorbic Acid (VITAMIN C PO) Take 1,000 mg by mouth daily       ferrous sulfate (IRON) 325 (65 FE) MG tablet Take 325 mg by mouth daily (with breakfast)       Selenium 200 MCG TABS Take 100 mcg by mouth daily. Pt takes one half tab of the 200 mcg daily        levothyroxine (SYNTHROID/LEVOTHROID) 25 MCG tablet TAKE 1 TABLET BY MOUTH EVERY DAY 90 tablet 0     dexamethasone (DECADRON) 4 MG tablet Take 5 tablets (20 mg) by mouth with food. Days 1, 2, 8, 9, 15, 16, 22, and 23. On carfilzomib days, take prior to carfilzomib dose. 40 tablet 0     [DISCONTINUED] cyclophosphamide (CYTOXAN) 50 MG capsule CHEMO Take 6 capsules (300 mg) by mouth once a week 24 capsule 0       SERVICES:  Home Care:  Physical Therapy and From:  [facility]    ADDITIONAL INSTRUCTIONS:  None except accuchecks and call PCP if consisently > 200    ALEXANDER Riojas  CNP  This document was electronically signed on February 7, 2018

## 2018-02-08 NOTE — PROGRESS NOTES
"Oncology Rooming Note    February 8, 2018 9:39 AM   Roc Parkinson is a 89 year old male who presents for:    Chief Complaint   Patient presents with     Oncology Clinic Visit     Initial Vitals: /57 (BP Location: Left arm, Patient Position: Chair, Cuff Size: Adult Regular)  Pulse 70  Temp 97.7  F (36.5  C) (Oral)  Resp 18  Wt 93.4 kg (206 lb)  SpO2 95%  BMI 29.56 kg/m2 Estimated body mass index is 29.56 kg/(m^2) as calculated from the following:    Height as of 2/7/18: 1.778 m (5' 10\").    Weight as of this encounter: 93.4 kg (206 lb). Body surface area is 2.15 meters squared.  No Pain (0) Comment: Data Unavailable   No LMP for male patient.  Allergies reviewed: Yes  Medications reviewed: Yes    Medications: Medication refills not needed today.  Pharmacy name entered into [employer]:    [facility] [place], [place] - [address] [facility] [address] [place] [address] [address] AT [facility]    Clinical concerns: None     5 minutes for nursing intake (face to face time)     Diana Mosley CMA              "

## 2018-02-08 NOTE — PROGRESS NOTES
[facility] PHYSICIANS  HEMATOLOGY ONCOLOGY     DIAGNOSIS:    1- Kappa light chain IgM multiple myeloma in a 89-year-old patient.  Bone marrow biopsy on 11/17/2016 showed hypercellular bone marrow with 65% cellularity of light chain restricted plasma cells.  FISH or G-banding could not be performed due to insufficient sample.   There is a background of myelodysplastic syndrome.  The patient was initially seen in the hospital on 11/17/2016 for macrocytic anemia and pancytopenia and transfusion requirement and a bone marrow biopsy was performed.   2- History of prostate cancer s/p EBRT s/p brachytherapy in 2003 (recent PSA 0.10), superficial bladder cancer ,no recurrences since 1986     TREATMENT: 12/15/16 velcade/dex. 4/12/17 added cyclophosphamide 300 mg weekly.6/28/17 we discontinued velcade due to concern for neuropathy and continued with weekly cyclophosphamide and dexamethasone.   1/2/18 switched to Carfilzomib and dexamethasone.      SUBJECTIVE:  The patient was seen as a followup today. He has been feeling well. No new complaints.     REVIEW OF SYSTEMS:  A complete review of systems was performed and found to be negative other than pertinent positives mentioned in history of present illness.     Past medical, social histories reviewed.    Meds- Reviewed.     PHYSICAL EXAMINATION:   VITAL SIGNS:/57 (BP Location: Left arm, Patient Position: Chair, Cuff Size: Adult Regular)  Pulse 70  Temp 97.7  F (36.5  C) (Oral)  Resp 18  Wt 93.4 kg (206 lb)  SpO2 95%  BMI 29.56 kg/m2  CONSTITUTIONAL: Sitting comfortably.   HEENT: Pupils are equal. Oropharynx is clear.   NECK: No cervical or supraclavicular lymphadenopathy.   RESPIRATORY: Clear bilaterally.   CARD/VASC: S1, S2, regular.   GI: Soft, nontender, nondistended, no hepatosplenomegaly.   MUSKULOSKELETAL: Warm, well perfused.   NEUROLOGIC: Alert, awake.   INTEGUMENT: No rash.   LYMPHATICS: No edema.   PSYCH: Mood and affect was normal.      LABORATORY DATA AND IMAGING REVIEWED DURING THIS VISIT:  Recent Labs   Lab Test  02/07/18   1100  01/31/18   1030   01/22/18   0837  01/21/18   0834  01/20/18   0220   NA  140  139   < >  141  140  139   POTASSIUM  4.1  4.1   < >  4.1  4.1  4.1   CHLORIDE  108  105   < >  109  108  107   CO2  22  25   < >  23  22  22   ANIONGAP  10  9   < >  9  10  10   BUN  24  28   < >  25  29  45*   CR  1.14  1.12   < >  1.01  1.02  1.45*   GLC  241*  190*   < >  110*  183*  75   MICHELLE  8.6  8.7   < >  8.9  8.8  9.1   MAG   --    --    --   2.3   --   2.2   PHOS   --    --    --    --   2.9  2.2*    < > = values in this interval not displayed.     Recent Labs   Lab Test  02/07/18   1100  01/31/18   1030  01/23/18   0850  01/22/18   0837   WBC  3.6*  3.0*  4.6  4.3   HGB  8.9*  8.6*  8.6*  8.3*   PLT  152  221  63*  40*   MCV  117*  113*  111*  111*   NEUTROPHIL  71.9  76.3   --   93.0     Recent Labs   Lab Test  02/07/18   1100  01/31/18   1030  01/20/18   0220   11/17/16   0938   BILITOTAL  0.4  0.4  0.3   < >   --    ALKPHOS  99  100  74   < >   --    ALT  14  14  15   < >   --    AST  14  11  13   < >   --    ALBUMIN  2.7*  2.7*  2.6*   < >   --    LDH   --    --    --    --   110    < > = values in this interval not displayed.     ECOG PS: 1    ASSESSMENT:  This is a 89-year-old gentleman who has kappa light chain multiple myeloma with hypercellular marrow with 65% of cells are light chain restricted plasma cells.  He had severe pancytopenia and has needed a transfusion in the hospital.  He did not have hypercalcemia and his renal function was normal. He has a background of myelodysplastic syndrome on his bone marrow biopsy; however, majority of the cell population was monoclonal plasma cell population.   He was started on treatment due to cytopenia.   In 4/2017 his light chain levels were getting worse. M spike was stable. We added cyclophosphamide to the regimen. In 6/2017 discontinued velcade for concern of development of  neuropathy, in hindsight the pain appeared to be related to arthritis.     - He is on Aranesp 300 mcg SQ every three weeks.  He is on Carfilzomib and dexamethasone.   He will resume chemotherapy today.     PLAN:   1.  Continue Carfilzomib/Dexamethasone. Chemo today.   2.  Zometa every 12 weeks  3. Continue Aranesp 300 mcg SQ every three weeks  4- RTC NP 2 weeks, see me in 4 weeks  5- Continue Acyclovir    ALISON JON MD    2/8/2018

## 2018-02-08 NOTE — PATIENT INSTRUCTIONS
1.  Continue Carfilzomib/Dexamethasone. Chemo today.   2.  Zometa every 12 weeks  Scheduled/janice  3. Continue Aranesp 300 mcg SQ every three weeks  Scheduled/janice  4- RTC NP 2 weeks, see me in 4 weeks  Scheduled/janice  5- Continue Acyclovir      Please make sure appointments are scheduled as written above      AVS printed & given to patient/janice

## 2018-02-08 NOTE — MR AVS SNAPSHOT
After Visit Summary   2/8/2018    Roc Parkinson    MRN: [id number]           Patient Information     Date Of Birth          7/7/1926        Visit Information        Provider Department      2/8/2018 10:00 AM Gretel Quinn MD [facility]        Care Instructions    1.  Continue Carfilzomib/Dexamethasone. Chemo today.   2.  Zometa every 12 weeks  3. Continue Aranesp 300 mcg SQ every three weeks  4- RTC NP 2 weeks, see me in 4 weeks  5- Continue Acyclovir      Please make sure appointments are scheduled as written above          Follow-ups after your visit        Your next 10 appointments already scheduled     Feb 14, 2018 11:30 AM CST   Level 2 with SH INFUSION CHAIR 12   [facility] ([facility])    [facility]  [address]  [place] [address]   [phone]            Feb 15, 2018 11:00 AM CST   Level 2 with SH INFUSION CHAIR 20   [facility] ([facility])    [facility]  [address]  [place] [address]   [phone]            Feb 21, 2018 11:00 AM CST   Level 2 with SH INFUSION CHAIR 20   [facility] ([facility])    [facility]  [address]  [place] [address]   [phone]            Feb 22, 2018 10:30 AM CST   Level 2 with SH INFUSION CHAIR 20   [facility] ([facility])    [facility]  [address]  [place] [address]   [phone]            Feb 22, 2018 11:00 AM CST   Return Visit with ALEXANDER Salazar CNP   [facility] ([facility])    [facility]  [address]  [place] [address]   [phone]            Mar 07, 2018 11:30 AM CST   Level 2 with SH INFUSION CHAIR 14   [facility]  "and [facility]fusion [facility] ([facility])    [facility] [place]  [address]  [place] [address]   [phone]            Mar 08, 2018 11:30 AM CST   Level 2 with  INFUSION CHAIR 12   [facility] ([facility])    [facility] [place]  [address]  [place] [address]   [phone]            Mar 08, 2018 12:30 PM CST   Return Visit with Gretel Quinn MD   [facility] ([facility])    [facility]  [address]  [place] [address] [phone]              Who to contact     If you have questions or need follow up information about today's clinic visit or your schedule please contact [facility] directly at [phone].  Normal or non-critical lab and imaging results will be communicated to you by [employer], letter or phone within 4 business days after the clinic has received the results. If you do not hear from us within 7 days, please contact the clinic through [employer] or phone. If you have a critical or abnormal lab result, we will notify you by phone as soon as possible.  Submit refill requests through [employer] or call your pharmacy and they will forward the refill request to us. Please allow 3 business days for your refill to be completed.          Additional Information About Your Visit        [employer] Information     [employer] lets you send messages to your doctor, view your test results, renew your prescriptions, schedule appointments and more. To sign up, go to [email or web address] . Click on \"Log in\" on the left side of the screen, which will take you to the Welcome page. Then click on \"Sign up Now\" on the right side of the page.     You will be asked to enter the access code listed below, as well as some personal information. Please follow the directions to create your username and password.     Your access code is: " [email or web address]  Expires: 3/26/2018 11:10 AM     Your access code will  in 90 days. If you need help or a new code, please call your [facility] or [phone].        Care EveryWhere ID     This is your Care EveryWhere ID. This could be used by other organizations to access your [facility] medical records  [id number]        Your Vitals Were     Pulse Temperature Respirations Pulse Oximetry BMI (Body Mass Index)       70 97.7  F (36.5  C) (Oral) 18 95% 29.56 kg/m2        Blood Pressure from Last 3 Encounters:   18 104/57   18 149/71   18 116/65    Weight from Last 3 Encounters:   18 93.4 kg (206 lb)   18 93.1 kg (205 lb 3.2 oz)   18 90.6 kg (199 lb 12.8 oz)              Today, you had the following     No orders found for display       Primary Care Provider Office Phone # Fax #    Dickson Reich [phone] [address] [facility] [address]        Equal Access to Services     [facility] Hospital[facility]DEL[facility] : Hadii aad ku hadasho Somaeali, waaxda luqadaha, qaybta kaalmada adesadieyada, melita rodriguez. So [facility] [phone].    ATENCIÓN: Si habla español, tiene a nolan disposición servicios gratuitos de asistencia lingüística. [facility] [phone].    We comply with applicable federal civil rights laws and [place] laws. We do not discriminate on the basis of race, color, national origin, age, disability, sex, sexual orientation, or gender identity.            Thank you!     Thank you for choosing [facility]  for your care. Our goal is always to provide you with excellent care. Hearing back from our patients is one way we can continue to improve our services. Please take a few minutes to complete the written survey that you may receive in the mail after your visit with us. Thank you!             Your Updated Medication List - Protect others around you: Learn how to safely use, store and throw away your medicines at  www.disposemymeds.org.          This list is accurate as of 2/8/18 10:45 AM.  Always use your most recent med list.                   Brand Name Dispense Instructions for use Diagnosis    acyclovir 400 MG tablet    ZOVIRAX    60 tablet    Take 1 tablet (400 mg) by mouth 2 times daily Viral Prophylaxis.    Multiple myeloma not having achieved remission (H)       amLODIPine 5 MG tablet    NORVASC    90 tablet    TAKE 1 TABLET BY MOUTH DAILY    Essential hypertension, benign       * B-D U/F 31G X 8 MM   Generic drug:  insulin pen needle           * B-D U/F 31G X 8 MM   Generic drug:  insulin pen needle     500 each    USE 5 PEN NEEDLES PER DAY OR AS DIRECTED    Type 2 diabetes mellitus with stage 3 chronic kidney disease, with long-term current use of insulin (H)       Blood Glucose Monitoring Suppl Misc      3 times daily (before meals)        dexamethasone 4 MG tablet    DECADRON    40 tablet    Take 5 tablets (20 mg) by mouth with food. Days 1, 2, 8, 9, 15, 16, 22, and 23. On carfilzomib days, take prior to carfilzomib dose.    Multiple myeloma not having achieved remission (H)       ferrous sulfate 325 (65 FE) MG tablet    IRON     Take 325 mg by mouth daily (with breakfast)        FLOMAX 0.4 MG capsule   Generic drug:  tamsulosin      Take 0.4 mg by mouth At Bedtime        FREESTYLE LITE test strip   Generic drug:  blood glucose monitoring     200 strip    USE 1 STRIP TO TEST TWICE DAILY.    Uncontrolled type 1 diabetes mellitus with stage 3 chronic kidney disease (H)       furosemide 20 MG tablet    LASIX    90 tablet    Take 1 tablet (20 mg) by mouth daily    Multiple myeloma not having achieved remission (H)       gemfibrozil 600 MG tablet    LOPID    180 tablet    TAKE 1 TABLET BY MOUTH TWICE DAILY    Hyperlipidemia       * INSULIN ASPART SC      Inject Subcutaneous every morning Inject 6 unit subcutaneously one time a day for Diabetes II 100unit/mL before breakfast        * INSULIN ASPART SC      Inject  Subcutaneous daily (before lunch) Inject 5 unit subcutaneously one time a day for Diabetes II before lunch        * INSULIN ASPART SC      Inject Subcutaneous At Bedtime Inject 4 unit subcutaneously at bedtime for Diabetes II Ok to use Humalog insulin with same order details        insulin detemir 100 UNIT/ML injection    LEVEMIR FLEXPEN/FLEXTOUCH    15 mL    Inject 16 Units Subcutaneous every morning (before breakfast)    Type 2 diabetes mellitus with diabetic chronic kidney disease, unspecified CKD stage, unspecified long term insulin use status (H), Uncontrolled type 2 diabetes mellitus with hyperglycemia, unspecified long term insulin use status (H), Type 2 diabetes mellitus with stage 3 chronic kidney disease, with long-term current use of insulin (H)       levothyroxine 25 MCG tablet    SYNTHROID/LEVOTHROID    90 tablet    TAKE 1 TABLET BY MOUTH EVERY DAY    Acquired hypothyroidism       LISINOPRIL PO      Take 15 mg by mouth daily        metoprolol tartrate 25 MG tablet    LOPRESSOR    180 tablet    TAKE 1 TABLET BY MOUTH TWICE DAILY    Essential hypertension       omeprazole 20 MG CR capsule    priLOSEC    90 capsule    TAKE 1 CAPSULE BY MOUTH EVERY DAY    Congenital hiatus hernia       ondansetron 8 MG tablet    ZOFRAN    10 tablet    Take 1 tablet (8 mg) by mouth every 8 hours as needed (Nausea/Vomiting)    Multiple myeloma not having achieved remission (H)       polyethylene glycol Packet    MIRALAX/GLYCOLAX    7 packet    Take 17 g by mouth 2 times daily as needed (constipation)    Slow transit constipation       Selenium 200 MCG Tabs      Take 100 mcg by mouth daily. Pt takes one half tab of the 200 mcg daily        VITAMIN C PO      Take 1,000 mg by mouth daily        VITAMIN D (CHOLECALCIFEROL) PO      Take 1,000 Units by mouth        * Notice:  This list has 5 medication(s) that are the same as other medications prescribed for you. Read the directions carefully, and ask your doctor or other care  provider to review them with you.

## 2018-02-08 NOTE — PROGRESS NOTES
Infusion Nursing Note:  Roc Parkinson presents today for C2D2 Kyprolis.    Patient seen by provider today: Yes: Dr. Quinn   present during visit today: Not Applicable.    Note: patient stated that he did not take the dexamethasone this morning. This RN called [facility] and spoke to Davonte ARGUETA to confirm that he did not take the medication dexamethasone. Jeanie Padgett, RN spoke to Dr. Quinn to get an order for IV Dexamethasone.    Intravenous Access:  Peripheral IV placed.    Treatment Conditions:  Lab Results   Component Value Date    HGB 8.9 02/07/2018     Lab Results   Component Value Date    WBC 3.6 02/07/2018      Lab Results   Component Value Date    ANEU 2.6 02/07/2018     Lab Results   Component Value Date     02/07/2018      Lab Results   Component Value Date     02/07/2018                   Lab Results   Component Value Date    POTASSIUM 4.1 02/07/2018           Lab Results   Component Value Date    MAG 2.3 01/22/2018            Lab Results   Component Value Date    CR 1.14 02/07/2018                   Lab Results   Component Value Date    MICHELLE 8.6 02/07/2018                Lab Results   Component Value Date    BILITOTAL 0.4 02/07/2018           Lab Results   Component Value Date    ALBUMIN 2.7 02/07/2018                    Lab Results   Component Value Date    ALT 14 02/07/2018           Lab Results   Component Value Date    AST 14 02/07/2018       Results reviewed, labs MET treatment parameters, ok to proceed with treatment.      Post Infusion Assessment:  Patient tolerated infusion without incident.  Blood return noted pre and post infusion.  Site patent and intact, free from redness, edema or discomfort.  No evidence of extravasations.  Access discontinued per protocol.    Discharge Plan:   Discharge instructions reviewed with: Patient.  Patient and/or family verbalized understanding of discharge instructions and all questions answered.  Copy of AVS reviewed with patient  and/or family.  Patient will return 2/14/18 for next appointment.  Patient discharged in stable condition accompanied by: self.  Departure Mode: Ambulatory.    KENDALL Chan RN

## 2018-02-08 NOTE — MR AVS SNAPSHOT
After Visit Summary   2/8/2018    Roc Parkinson    MRN: [id number]           Patient Information     Date Of Birth          7/7/1926        Visit Information        Provider Department      2/8/2018 9:30 AM SH INFUSION CHAIR 14 [facility]        Today's Diagnoses     Multiple myeloma not having achieved remission (H)    -  1       Follow-ups after your visit        Your next 10 appointments already scheduled     Feb 14, 2018 11:30 AM CST   Level 2 with SH INFUSION CHAIR 12   [facility] ([facility])    [facility]  [address]  [place] [address]   [phone]            Feb 15, 2018 11:00 AM CST   Level 2 with SH INFUSION CHAIR 20   [facility] ([facility])    [facility]  [address]  [place] [address]   [phone]            Feb 21, 2018 11:00 AM CST   Level 2 with SH INFUSION CHAIR 20   [facility] ([facility])    [facility]  [address]  [place] [address]   [phone]            Feb 22, 2018 10:30 AM CST   Level 2 with SH INFUSION CHAIR 20   [facility] ([facility])    [facility]  [address]  [place] [address]   [phone]            Feb 22, 2018 11:00 AM CST   Return Visit with ALEXANDER Salazar CNP   [facility] ([facility])    [facility]  [address]  [place] [address]   [phone]            Mar 07, 2018 11:30 AM CST   Level 2 with SH INFUSION CHAIR 14   [facility] ([facility])    [facility]  [address]  [place] [address]   [phone]            Mar 08, 2018 11:30 AM  "CST   Level 2 with  INFUSION CHAIR 12   [facility] ([facility])    [facility] [place] [address]  [place] [address] [phone]            Mar 08, 2018 12:30 PM CST   Return Visit with Gretel Quinn MD   [facility] ([facility])    [facility] [place] [address]  [place] [address] [phone]              Who to contact     If you have questions or need follow up information about today's clinic visit or your schedule please contact [facility] directly at [phone].  Normal or non-critical lab and imaging results will be communicated to you by Igeahart, letter or phone within 4 business days after the clinic has received the results. If you do not hear from us within 7 days, please contact the clinic through MyChart or phone. If you have a critical or abnormal lab result, we will notify you by phone as soon as possible.  Submit refill requests through HubCast or call your pharmacy and they will forward the refill request to us. Please allow 3 business days for your refill to be completed.          Additional Information About Your Visit        Igeahart Information     HubCast lets you send messages to your doctor, view your test results, renew your prescriptions, schedule appointments and more. To sign up, go to [email or web address] . Click on \"Log in\" on the left side of the screen, which will take you to the Welcome page. Then click on \"Sign up Now\" on the right side of the page.     You will be asked to enter the access code listed below, as well as some personal information. Please follow the directions to create your username and password.     Your access code is: [email or web address]  Expires: 3/26/2018 11:10 AM     Your access code will  in 90 days. If you need help or a new code, please call your [place] clinic or [phone].   "      Care EveryWhere ID     This is your Care EveryWhere ID. This could be used by other organizations to access your [facility] medical records  [id number]        Your Vitals Were     Pulse Respirations                70 16           Blood Pressure from Last 3 Encounters:   02/08/18 104/57   02/08/18 143/72   02/07/18 149/71    Weight from Last 3 Encounters:   02/08/18 93.4 kg (206 lb)   02/07/18 93.1 kg (205 lb 3.2 oz)   02/07/18 90.6 kg (199 lb 12.8 oz)              Today, you had the following     No orders found for display       Primary Care Provider Office Phone # Fax #    Dickson Reich [phone] [address] [facility] [address]        Equal Access to Services     [facility] HospitalAUDELIA : Hadii aad steve hadasho Soomaali, waaxda luqadaha, qaybta kaalmada adeegyada, melita shaw . So [facility] [phone].    ATENCIÓN: Si habla español, tiene a nolan disposición servicios gratuitos de asistencia lingüística. Eric al [phone].    We comply with applicable federal civil rights laws and [place] laws. We do not discriminate on the basis of race, color, national origin, age, disability, sex, sexual orientation, or gender identity.            Thank you!     Thank you for choosing [facility]  for your care. Our goal is always to provide you with excellent care. Hearing back from our patients is one way we can continue to improve our services. Please take a few minutes to complete the written survey that you may receive in the mail after your visit with us. Thank you!             Your Updated Medication List - Protect others around you: Learn how to safely use, store and throw away your medicines at www.disposemymeds.org.          This list is accurate as of 2/8/18 11:51 AM.  Always use your most recent med list.                   Brand Name Dispense Instructions for use Diagnosis    acyclovir 400 MG tablet    ZOVIRAX    60 tablet     Take 1 tablet (400 mg) by mouth 2 times daily Viral Prophylaxis.    Multiple myeloma not having achieved remission (H)       amLODIPine 5 MG tablet    NORVASC    90 tablet    TAKE 1 TABLET BY MOUTH DAILY    Essential hypertension, benign       * B-D U/F 31G X 8 MM   Generic drug:  insulin pen needle           * B-D U/F 31G X 8 MM   Generic drug:  insulin pen needle     500 each    USE 5 PEN NEEDLES PER DAY OR AS DIRECTED    Type 2 diabetes mellitus with stage 3 chronic kidney disease, with long-term current use of insulin (H)       Blood Glucose Monitoring Suppl Misc      3 times daily (before meals)        dexamethasone 4 MG tablet    DECADRON    40 tablet    Take 5 tablets (20 mg) by mouth with food. Days 1, 2, 8, 9, 15, 16, 22, and 23. On carfilzomib days, take prior to carfilzomib dose.    Multiple myeloma not having achieved remission (H)       ferrous sulfate 325 (65 FE) MG tablet    IRON     Take 325 mg by mouth daily (with breakfast)        FLOMAX 0.4 MG capsule   Generic drug:  tamsulosin      Take 0.4 mg by mouth At Bedtime        FREESTYLE LITE test strip   Generic drug:  blood glucose monitoring     200 strip    USE 1 STRIP TO TEST TWICE DAILY.    Uncontrolled type 1 diabetes mellitus with stage 3 chronic kidney disease (H)       furosemide 20 MG tablet    LASIX    90 tablet    Take 1 tablet (20 mg) by mouth daily    Multiple myeloma not having achieved remission (H)       gemfibrozil 600 MG tablet    LOPID    180 tablet    TAKE 1 TABLET BY MOUTH TWICE DAILY    Hyperlipidemia       * INSULIN ASPART SC      Inject Subcutaneous every morning Inject 6 unit subcutaneously one time a day for Diabetes II 100unit/mL before breakfast        * INSULIN ASPART SC      Inject Subcutaneous daily (before lunch) Inject 5 unit subcutaneously one time a day for Diabetes II before lunch        * INSULIN ASPART SC      Inject Subcutaneous At Bedtime Inject 4 unit subcutaneously at bedtime for Diabetes II Ok to use  Humalog insulin with same order details        * insulin aspart 100 UNIT/ML injection    NovoLOG FLEXPEN    60 mL    Inject 4-17 units under the skin four times daily (with meals and nightly)    Uncontrolled type 2 diabetes mellitus with hyperglycemia, unspecified long term insulin use status (H), Type 2 diabetes mellitus with diabetic chronic kidney disease, unspecified CKD stage, unspecified long term insulin use status (H), Type 2 diabetes mellitus with stage 3 chronic kidney disease, with long-term current use of insulin (H)       insulin detemir 100 UNIT/ML injection    LEVEMIR FLEXPEN/FLEXTOUCH    15 mL    Inject 16 Units Subcutaneous every morning (before breakfast)    Type 2 diabetes mellitus with diabetic chronic kidney disease, unspecified CKD stage, unspecified long term insulin use status (H), Uncontrolled type 2 diabetes mellitus with hyperglycemia, unspecified long term insulin use status (H), Type 2 diabetes mellitus with stage 3 chronic kidney disease, with long-term current use of insulin (H)       levothyroxine 25 MCG tablet    SYNTHROID/LEVOTHROID    90 tablet    TAKE 1 TABLET BY MOUTH EVERY DAY    Acquired hypothyroidism       LISINOPRIL PO      Take 15 mg by mouth daily        metoprolol tartrate 25 MG tablet    LOPRESSOR    180 tablet    TAKE 1 TABLET BY MOUTH TWICE DAILY    Essential hypertension       omeprazole 20 MG CR capsule    priLOSEC    90 capsule    TAKE 1 CAPSULE BY MOUTH EVERY DAY    Congenital hiatus hernia       ondansetron 8 MG tablet    ZOFRAN    10 tablet    Take 1 tablet (8 mg) by mouth every 8 hours as needed (Nausea/Vomiting)    Multiple myeloma not having achieved remission (H)       polyethylene glycol Packet    MIRALAX/GLYCOLAX    7 packet    Take 17 g by mouth 2 times daily as needed (constipation)    Slow transit constipation       Selenium 200 MCG Tabs      Take 100 mcg by mouth daily. Pt takes one half tab of the 200 mcg daily        VITAMIN C PO      Take 1,000 mg by  mouth daily        VITAMIN D (CHOLECALCIFEROL) PO      Take 1,000 Units by mouth        * Notice:  This list has 6 medication(s) that are the same as other medications prescribed for you. Read the directions carefully, and ask your doctor or other care provider to review them with you.

## 2018-02-08 NOTE — LETTER
2/8/2018         RE: Roc Parkinson  [address]   [place] [place] [place] [address]        Dear Colleague,    Thank you for referring your patient, Roc Parkinson, to the [facility]. Please see a copy of my visit note below.    [facility] PHYSICIANS  HEMATOLOGY ONCOLOGY     DIAGNOSIS:    1- Kappa light chain IgM multiple myeloma in a 89-year-old patient.  Bone marrow biopsy on 11/17/2016 showed hypercellular bone marrow with 65% cellularity of light chain restricted plasma cells.  FISH or G-banding could not be performed due to insufficient sample.   There is a background of myelodysplastic syndrome.  The patient was initially seen in the hospital on 11/17/2016 for macrocytic anemia and pancytopenia and transfusion requirement and a bone marrow biopsy was performed.   2- History of prostate cancer s/p EBRT s/p brachytherapy in 2003 (recent PSA 0.10), superficial bladder cancer ,no recurrences since 1986     TREATMENT: 12/15/16 velcade/dex. 4/12/17 added cyclophosphamide 300 mg weekly.6/28/17 we discontinued velcade due to concern for neuropathy and continued with weekly cyclophosphamide and dexamethasone.   1/2/18 switched to Carfilzomib and dexamethasone.      SUBJECTIVE:  The patient was seen as a followup today. He has been feeling well. No new complaints.     REVIEW OF SYSTEMS:  A complete review of systems was performed and found to be negative other than pertinent positives mentioned in history of present illness.     Past medical, social histories reviewed.    Meds- Reviewed.     PHYSICAL EXAMINATION:   VITAL SIGNS:/57 (BP Location: Left arm, Patient Position: Chair, Cuff Size: Adult Regular)  Pulse 70  Temp 97.7  F (36.5  C) (Oral)  Resp 18  Wt 93.4 kg (206 lb)  SpO2 95%  BMI 29.56 kg/m2  CONSTITUTIONAL: Sitting comfortably.   HEENT: Pupils are equal. Oropharynx is clear.   NECK: No cervical or supraclavicular lymphadenopathy.   RESPIRATORY: Clear  bilaterally.   CARD/VASC: S1, S2, regular.   GI: Soft, nontender, nondistended, no hepatosplenomegaly.   MUSKULOSKELETAL: Warm, well perfused.   NEUROLOGIC: Alert, awake.   INTEGUMENT: No rash.   LYMPHATICS: No edema.   PSYCH: Mood and affect was normal.     LABORATORY DATA AND IMAGING REVIEWED DURING THIS VISIT:  Recent Labs   Lab Test  02/07/18   1100  01/31/18   1030   01/22/18   0837  01/21/18   0834  01/20/18   0220   NA  140  139   < >  141  140  139   POTASSIUM  4.1  4.1   < >  4.1  4.1  4.1   CHLORIDE  108  105   < >  109  108  107   CO2  22  25   < >  23  22  22   ANIONGAP  10  9   < >  9  10  10   BUN  24  28   < >  25  29  45*   CR  1.14  1.12   < >  1.01  1.02  1.45*   GLC  241*  190*   < >  110*  183*  75   MICHELLE  8.6  8.7   < >  8.9  8.8  9.1   MAG   --    --    --   2.3   --   2.2   PHOS   --    --    --    --   2.9  2.2*    < > = values in this interval not displayed.     Recent Labs   Lab Test  02/07/18   1100  01/31/18   1030  01/23/18   0850  01/22/18   0837   WBC  3.6*  3.0*  4.6  4.3   HGB  8.9*  8.6*  8.6*  8.3*   PLT  152  221  63*  40*   MCV  117*  113*  111*  111*   NEUTROPHIL  71.9  76.3   --   93.0     Recent Labs   Lab Test  02/07/18   1100  01/31/18   1030  01/20/18   0220   11/17/16   0938   BILITOTAL  0.4  0.4  0.3   < >   --    ALKPHOS  99  100  74   < >   --    ALT  14  14  15   < >   --    AST  14  11  13   < >   --    ALBUMIN  2.7*  2.7*  2.6*   < >   --    LDH   --    --    --    --   110    < > = values in this interval not displayed.     ECOG PS: 1    ASSESSMENT:  This is a 89-year-old gentleman who has kappa light chain multiple myeloma with hypercellular marrow with 65% of cells are light chain restricted plasma cells.  He had severe pancytopenia and has needed a transfusion in the hospital.  He did not have hypercalcemia and his renal function was normal. He has a background of myelodysplastic syndrome on his bone marrow biopsy; however, majority of the cell population was  "monoclonal plasma cell population.   He was started on treatment due to cytopenia.   In 4/2017 his light chain levels were getting worse. M spike was stable. We added cyclophosphamide to the regimen. In 6/2017 discontinued velcade for concern of development of neuropathy, in hindsight the pain appeared to be related to arthritis.     - He is on Aranesp 300 mcg SQ every three weeks.  He is on Carfilzomib and dexamethasone.   He will resume chemotherapy today.     PLAN:   1.  Continue Carfilzomib/Dexamethasone. Chemo today.   2.  Zometa every 12 weeks  3. Continue Aranesp 300 mcg SQ every three weeks  4- RTC NP 2 weeks, see me in 4 weeks  5- Continue Acyclovir    GRETEL QUINN MD    2/8/2018        Oncology Rooming Note    February 8, 2018 9:39 AM   Roc Parkinson is a 89 year old male who presents for:    Chief Complaint   Patient presents with     Oncology Clinic Visit     Initial Vitals: /57 (BP Location: Left arm, Patient Position: Chair, Cuff Size: Adult Regular)  Pulse 70  Temp 97.7  F (36.5  C) (Oral)  Resp 18  Wt 93.4 kg (206 lb)  SpO2 95%  BMI 29.56 kg/m2 Estimated body mass index is 29.56 kg/(m^2) as calculated from the following:    Height as of 2/7/18: 1.778 m (5' 10\").    Weight as of this encounter: 93.4 kg (206 lb). Body surface area is 2.15 meters squared.  No Pain (0) Comment: Data Unavailable   No LMP for male patient.  Allergies reviewed: Yes  Medications reviewed: Yes    Medications: Medication refills not needed today.  Pharmacy name entered into [facility]:    [facility] [place], [place] - [address] [facility] [address] [facility] [address] [facility] [address]    Clinical concerns: None     5 minutes for nursing intake (face to face time)     [facility]                Again, thank you for allowing me to participate in the care of your patient.        Sincerely,        Gretel Quinn MD    "

## 2018-02-09 NOTE — TELEPHONE ENCOUNTER
Social Work Progress Note      Data/Intervention:  Patient Name:  Roc Parkinson  /Age:  1926 (89 year old)    Reason for Follow-Up:  Mr. Parkinson is a mary 89-year-old gentleman who has a diagnosis of multiple myeloma. This clinician received call from son Bam with questions regarding meal delivery from [facility] as pt has recently discharged from TCU.     Intervention:   Pt has three sons who have been involved in pt's care Bam ([place]), Alfredo ([place]), and Erick ([place]). Bam inquiring today about [facility]. Due to late call in the day and [facility] being closed for weekend, this clinician was unable to determine status of pt's application with [facility]. Son reports today that family member recently , and that pt is planning to have an appointment next week to adjust the hearing aids so that pt will be able to use them. Son reports no other concerns at current time.     Plan:  1) This clinician will re-submit application to [facility] today and will contact [employer] [facility] on Monday to inquire as to status of previous application  2) Will continue to be available as needed for ongoing psychosocial support as pt continues to adjust to treatment and realize its impacts.     Please call or page if needs or concerns arise.     WESLEY Negro, [facility] HospitalSW  Direct Phone: [phone]  Pager: [phone]

## 2018-02-12 NOTE — TELEPHONE ENCOUNTER
Routing refill request to provider for review/approval because:  Increase frequency of testing form BID to QID

## 2018-02-12 NOTE — TELEPHONE ENCOUNTER
"Requested Prescriptions   Pending Prescriptions Disp Refills     FREESTYLE LITE test strip [Pharmacy Med Name: FREESTYLE LITE BLOOD GLUCOSE STRIPS]  Last Written Prescription Date:  8-21-17  Last Fill Quantity: 200,  # refills: 1   Last Office Visit  1/8/2018        with  [facility], [facility] or [facility] prescribing provider:     Future Office Visit:    Next 5 appointments (look out 90 days)     Feb 20, 2018  1:00 PM CST   Office Visit with Dickson Reich MD   [facility] ([facility])    [address] [place] [address]   [phone]            Feb 22, 2018 11:00 AM CST   Return Visit with ALEXANDER Salazar CNP   [facility] ([facility])    [facility]  [address]  [facility] [address]   [phone]            Mar 08, 2018 12:30 PM CST   Return Visit with Gretel Quinn MD   [facility] ([facility])    [facility]  [address]  [facility] [address]   [phone]                    200 strip 0     Sig: USE TO TEST FOUR TIMES DAILY    Diabetic Supplies Protocol Passed    2/10/2018  3:13 AM       Passed - Patient is 18 years of age or older       Passed - Patient has had appt within past 6 mos    Patient had office visit in the last 6 months or has a visit in the next 30 days with authorizing provider.  See \"Patient Info\" tab in inbasket, or \"Choose Columns\" in Meds & Orders section of the refill encounter.              "

## 2018-02-12 NOTE — TELEPHONE ENCOUNTER
Social Work Progress Note      Data/Intervention:  Patient Name:  Roc Parkinson  /Age:  1926 (89 year old)    Reason for Follow-Up:  This clinician called pt today to follow-up regarding [facility] request    Intervention:   This clinician informed pt today that request had been placed in January, and that this clinician had sent in an additional referral to [facility]. This clinician also informed pt that this clinician had spoken with [facility] earlier today who is planning to expedite pt's request given recent hospitalization. Pt learned that he should anticipate hearing from [facility] in next 1-2 business days. Pt reports that he is appreciative of this, and is pleased that son Bam is staying with him over next few days and assisting with meals in the meantime. Pt reported that he does not have any other psychosocial concerns or needs at current time, and is aware of ongoing SW support available in case of distress or need.     Plan:  Previously provided patient/family with writer's contact information and availability. Please page this clinician in the case of psychosocial distress or concern. Pt looking forward to visit to have hearing aids adjusted on 2018.    Please call or page if needs or concerns arise.     WESLEY Negro, [facility]  Direct Phone: [phone]  Pager: [phone]

## 2018-02-14 NOTE — PROGRESS NOTES
Infusion Nursing Note:  Roc Parkinson presents today for C2D8 Kyprolis.    Patient seen by provider today: No   present during visit today: Not Applicable.    Note: Pt confirmed that he is taking his dexamethasone as ordered.    Intravenous Access:  Labs drawn without difficulty.  Peripheral IV placed.    Treatment Conditions:  Lab Results   Component Value Date    HGB 9.2 02/14/2018     Lab Results   Component Value Date    WBC 4.0 02/14/2018      Lab Results   Component Value Date    ANEU 3.5 02/14/2018     Lab Results   Component Value Date    PLT 68 02/14/2018      Results reviewed, labs MET treatment parameters, ok to proceed with treatment.      Post Infusion Assessment:  Patient tolerated infusion without incident.  Blood return noted pre and post infusion.  Site patent and intact, free from redness, edema or discomfort.  No evidence of extravasations.   PIV kept per pt request. Pt is coming back tomorrow. Dr. Quinn is okay to keep the PIV overnight.    Discharge Plan:   Discharge instructions reviewed with: Patient.  Patient and/or family verbalized understanding of discharge instructions and all questions answered.  Copy of AVS reviewed with patient and/or family.  Patient will return 2/15/2018 for next appointment.  Patient discharged in stable condition accompanied by: self and son.  Departure Mode: Ambulatory.    Monik Marie RN

## 2018-02-14 NOTE — MR AVS SNAPSHOT
After Visit Summary   2/14/2018    Roc Parkinson    MRN: [id number]           Patient Information     Date Of Birth          7/7/1926        Visit Information        Provider Department      2/14/2018 11:30 AM  INFUSION CHAIR 13 [facility]        Today's Diagnoses     Multiple myeloma not having achieved remission (H)    -  1       Follow-ups after your visit        Your next 10 appointments already scheduled     Feb 15, 2018 11:00 AM CST   Level 2 with  INFUSION CHAIR 20   [facility] ([facility])    [facility]  [address]  [facility] [address]   [phone]            Feb 20, 2018  1:00 PM CST   Office Visit with Dickson Reich MD   [facility] ([facility])    [address] [place] [id number] [phone]           Bring a current list of meds and any records pertaining to this visit. For Physicals, please bring immunization records and any forms needing to be filled out. Please arrive 10 minutes early to complete paperwork.            Feb 21, 2018 11:00 AM CST   Level 2 with  INFUSION CHAIR 20   [facility] ([facility])    [facility]  [address]  [facility] [address]   [phone]            Feb 22, 2018 10:30 AM CST   Level 2 with  INFUSION CHAIR 20   [facility] ([facility])    [facility]  [address]  [facility] [address]   [phone]            Feb 22, 2018 11:00 AM CST   Return Visit with ALEXANDER Salazar CNP   [facility] ([facility])    [facility]  [address]  [facility] [address]   [phone]            Mar 07, 2018 11:30 AM CST   Level 2  "with  INFUSION CHAIR 14   [facility] ([facility])    [facility]  [address]  [place] [address]   [phone]            Mar 08, 2018 11:30 AM CST   Level 2 with  INFUSION CHAIR 12   [facility] ([facility])    [facility] [place]  [address]  [place] [address]   [phone]            Mar 08, 2018 12:30 PM CST   Return Visit with Gretel Quinn MD   [facility] ([facility])    [facility]  [address]  [place] [address] [phone]              Who to contact     If you have questions or need follow up information about today's clinic visit or your schedule please contact [facility] directly at [phone].  Normal or non-critical lab and imaging results will be communicated to you by [facility], letter or phone within 4 business days after the clinic has received the results. If you do not hear from us within 7 days, please contact the clinic through [employer] or phone. If you have a critical or abnormal lab result, we will notify you by phone as soon as possible.  Submit refill requests through [employer] or call your pharmacy and they will forward the refill request to us. Please allow 3 business days for your refill to be completed.          Additional Information About Your Visit        [facility] Information     Card[employer] Guard lets you send messages to your doctor, view your test results, renew your prescriptions, schedule appointments and more. To sign up, go to [email or web address] . Click on \"Log in\" on the left side of the screen, which will take you to the Welcome page. Then click on \"Sign up Now\" on the right side of the page.     You will be asked to enter the access code listed below, as well as some personal information. Please follow the directions to " "create your username and password.     Your access code is: [email or web address]  Expires: 3/26/2018 11:10 AM     Your access code will  in 90 days. If you need help or a new code, please call your [facility] or [phone].        Care EveryWhere ID     This is your Care EveryWhere ID. This could be used by other organizations to access your [place] medical records  [id number]        Your Vitals Were     Pulse Temperature Respirations Height Pulse Oximetry BMI (Body Mass Index)    71 97.9  F (36.6  C) (Oral) 18 1.778 m (5' 10\") 97% 29.07 kg/m2       Blood Pressure from Last 3 Encounters:   18 147/73   18 104/57   18 143/72    Weight from Last 3 Encounters:   18 91.9 kg (202 lb 9.6 oz)   18 93.4 kg (206 lb)   18 93.1 kg (205 lb 3.2 oz)              We Performed the Following     CBC with platelets differential        Primary Care Provider Office Phone # Fax #    Dickson Reich [phone] [address] [facility] [address]        Equal Access to Services     ELIE GAGNON : Hadii virgin[place] ku hadasho Soomaali, waaxda luqadaha, qaybta kaalmada adeegyada, waxay idiin haysbn constantine shaw . So [facility] [phone].    ATENCIÓN: Si habla español, tiene a nolan disposición servicios gratuitos de asistencia lingüística. [facility] [phone].    We comply with applicable federal civil rights laws and [place] laws. We do not discriminate on the basis of race, color, national origin, age, disability, sex, sexual orientation, or gender identity.            Thank you!     Thank you for choosing [facility] AND [facility] CENTER  for your care. Our goal is always to provide you with excellent care. Hearing back from our patients is one way we can continue to improve our services. Please take a few minutes to complete the written survey that you may receive in the mail after your visit with us. Thank you!             Your Updated Medication " List - Protect others around you: Learn how to safely use, store and throw away your medicines at www.disposemymeds.org.          This list is accurate as of 2/14/18  1:20 PM.  Always use your most recent med list.                   Brand Name Dispense Instructions for use Diagnosis    acyclovir 400 MG tablet    ZOVIRAX    60 tablet    Take 1 tablet (400 mg) by mouth 2 times daily Viral Prophylaxis.    Multiple myeloma not having achieved remission (H)       amLODIPine 5 MG tablet    NORVASC    90 tablet    TAKE 1 TABLET BY MOUTH DAILY    Essential hypertension, benign       * B-D U/F 31G X 8 MM   Generic drug:  insulin pen needle           * B-D U/F 31G X 8 MM   Generic drug:  insulin pen needle     500 each    USE 5 PEN NEEDLES PER DAY OR AS DIRECTED    Type 2 diabetes mellitus with stage 3 chronic kidney disease, with long-term current use of insulin (H)       Blood Glucose Monitoring Suppl Misc      3 times daily (before meals)        dexamethasone 4 MG tablet    DECADRON    40 tablet    Take 5 tablets (20 mg) by mouth with food. Days 1, 2, 8, 9, 15, 16, 22, and 23. On carfilzomib days, take prior to carfilzomib dose.    Multiple myeloma not having achieved remission (H)       ferrous sulfate 325 (65 FE) MG tablet    IRON     Take 325 mg by mouth daily (with breakfast)        FLOMAX 0.4 MG capsule   Generic drug:  tamsulosin      Take 0.4 mg by mouth At Bedtime        * FREESTYLE LITE test strip   Generic drug:  blood glucose monitoring     200 strip    USE 1 STRIP TO TEST TWICE DAILY.    Uncontrolled type 1 diabetes mellitus with stage 3 chronic kidney disease (H)       * FREESTYLE LITE test strip   Generic drug:  blood glucose monitoring     400 strip    USE TO TEST FOUR TIMES DAILY    Uncontrolled type 1 diabetes mellitus with stage 3 chronic kidney disease (H)       furosemide 20 MG tablet    LASIX    90 tablet    Take 1 tablet (20 mg) by mouth daily    Multiple myeloma not having achieved remission (H)        gemfibrozil 600 MG tablet    LOPID    180 tablet    TAKE 1 TABLET BY MOUTH TWICE DAILY    Hyperlipidemia       * INSULIN ASPART SC      Inject Subcutaneous every morning Inject 6 unit subcutaneously one time a day for Diabetes II 100unit/mL before breakfast        * INSULIN ASPART SC      Inject Subcutaneous daily (before lunch) Inject 5 unit subcutaneously one time a day for Diabetes II before lunch        * INSULIN ASPART SC      Inject Subcutaneous At Bedtime Inject 4 unit subcutaneously at bedtime for Diabetes II Ok to use Humalog insulin with same order details        * insulin aspart 100 UNIT/ML injection    NovoLOG FLEXPEN    60 mL    Inject 4-17 units under the skin four times daily (with meals and nightly)    Uncontrolled type 2 diabetes mellitus with hyperglycemia, unspecified long term insulin use status (H), Type 2 diabetes mellitus with diabetic chronic kidney disease, unspecified CKD stage, unspecified long term insulin use status (H), Type 2 diabetes mellitus with stage 3 chronic kidney disease, with long-term current use of insulin (H)       insulin detemir 100 UNIT/ML injection    LEVEMIR FLEXPEN/FLEXTOUCH    15 mL    Inject 16 Units Subcutaneous every morning (before breakfast)    Type 2 diabetes mellitus with diabetic chronic kidney disease, unspecified CKD stage, unspecified long term insulin use status (H), Uncontrolled type 2 diabetes mellitus with hyperglycemia, unspecified long term insulin use status (H), Type 2 diabetes mellitus with stage 3 chronic kidney disease, with long-term current use of insulin (H)       levothyroxine 25 MCG tablet    SYNTHROID/LEVOTHROID    90 tablet    TAKE 1 TABLET BY MOUTH EVERY DAY    Acquired hypothyroidism       LISINOPRIL PO      Take 15 mg by mouth daily        metoprolol tartrate 25 MG tablet    LOPRESSOR    180 tablet    TAKE 1 TABLET BY MOUTH TWICE DAILY    Essential hypertension       omeprazole 20 MG CR capsule    priLOSEC    90 capsule    TAKE 1  CAPSULE BY MOUTH EVERY DAY    Congenital hiatus hernia       ondansetron 8 MG tablet    ZOFRAN    10 tablet    Take 1 tablet (8 mg) by mouth every 8 hours as needed (Nausea/Vomiting)    Multiple myeloma not having achieved remission (H)       polyethylene glycol Packet    MIRALAX/GLYCOLAX    7 packet    Take 17 g by mouth 2 times daily as needed (constipation)    Slow transit constipation       Selenium 200 MCG Tabs      Take 100 mcg by mouth daily. Pt takes one half tab of the 200 mcg daily        VITAMIN C PO      Take 1,000 mg by mouth daily        VITAMIN D (CHOLECALCIFEROL) PO      Take 1,000 Units by mouth        * Notice:  This list has 8 medication(s) that are the same as other medications prescribed for you. Read the directions carefully, and ask your doctor or other care provider to review them with you.

## 2018-02-15 NOTE — TELEPHONE ENCOUNTER
Social Work Progress Note      Data/Intervention:  Patient Name:  Roc Parkinson  /Age:  1926 (89 year old)    Reason for Follow-Up:  This clinician called pt's home phone to follow-up regarding requests for support to engage with [employer] services, pt's son Bam assisting pt with setting up as pt is hard of hearing and has appointment scheduled next week to adjust hearing aids.     Intervention:   This clinician informed pt today that pt lives outside of delivery loop for [employer], pt reported that he would like his brother Elfego to receive deliveries on his behalf as he is in delivery area. This clinician provided pt's son, Bam, [employer] number and education as to [facility] [employer] services. Son to contact [employer] and set up delivery on pt's behalf. Son knows to contact this clinician with issues or concerns surrounding accessing these services. Family reported no other psychosocial concerns or needs at this time.     Plan:  Will continue to follow pt for ongoing psychosocial support as pt continues to adjust to treatment and realize its impacts.     Please call or page if needs or concerns arise.     WESLEY Negro, [facility]  Direct Phone: [phone]  Pager: [phone]

## 2018-02-15 NOTE — MR AVS SNAPSHOT
After Visit Summary   2/15/2018    Roc Parkinson    MRN: [id number]           Patient Information     Date Of Birth          7/7/1926        Visit Information        Provider Department      2/15/2018 11:00 AM  INFUSION CHAIR 20 [facility]        Today's Diagnoses     Multiple myeloma not having achieved remission (H)    -  1       Follow-ups after your visit        Your next 10 appointments already scheduled     Feb 20, 2018  1:00 PM CST   Office Visit with Dickson Reich MD   [facility] ([facility])    [address] [place] [address]   [phone]           Bring a current list of meds and any records pertaining to this visit. For Physicals, please bring immunization records and any forms needing to be filled out. Please arrive 10 minutes early to complete paperwork.            Feb 21, 2018 11:00 AM CST   Level 2 with  INFUSION CHAIR 20   [facility] ([facility])    [facility]  [address]  [facility] [address]   [phone]            Feb 22, 2018 10:30 AM CST   Level 2 with  INFUSION CHAIR 20   [facility] ([facility])    [facility]  [address]  [facility] [address]   [phone]            Feb 22, 2018 11:00 AM CST   Return Visit with ALEXANDER Salazar CNP   [facility] ([facility])    [facility]  [address]  [facility] [address]   [phone]            Mar 07, 2018 11:30 AM CST   Level 2 with  INFUSION CHAIR 14   [facility] ([facility])    [facility]  [address]  [facility] [address]   [phone]            Mar 08, 2018 11:30 AM CST   Level 2  "with  INFUSION CHAIR 12   [facility] ([facility])    [facility]  [address]  [place] [address] [phone]            Mar 08, 2018 12:30 PM CST   Return Visit with Gretel Quinn MD   [facility] ([facility])    [facility] [place]  [address]  [place] [address] [phone]              Who to contact     If you have questions or need follow up information about today's clinic visit or your schedule please contact [facility] directly at [phone].  Normal or non-critical lab and imaging results will be communicated to you by [employer], letter or phone within 4 business days after the clinic has received the results. If you do not hear from us within 7 days, please contact the clinic through [employer] or phone. If you have a critical or abnormal lab result, we will notify you by phone as soon as possible.  Submit refill requests through [employer] or call your pharmacy and they will forward the refill request to us. Please allow 3 business days for your refill to be completed.          Additional Information About Your Visit        [employer] Information     [employer] lets you send messages to your doctor, view your test results, renew your prescriptions, schedule appointments and more. To sign up, go to [email or web address] . Click on \"Log in\" on the left side of the screen, which will take you to the Welcome page. Then click on \"Sign up Now\" on the right side of the page.     You will be asked to enter the access code listed below, as well as some personal information. Please follow the directions to create your username and password.     Your access code is: [email or web address]  Expires: 3/26/2018 11:10 AM     Your access code will  in 90 days. If you need help or a new code, please call your [place] clinic or [phone].        Care " EveryWhere ID     This is your Care EveryWhere ID. This could be used by other organizations to access your [place] medical records  [id number]        Your Vitals Were     Pulse Temperature Respirations             69 97.8  F (36.6  C) (Oral) 16          Blood Pressure from Last 3 Encounters:   02/15/18 163/76   02/14/18 144/72   02/08/18 104/57    Weight from Last 3 Encounters:   02/14/18 91.9 kg (202 lb 9.6 oz)   02/08/18 93.4 kg (206 lb)   02/07/18 93.1 kg (205 lb 3.2 oz)              Today, you had the following     No orders found for display       Primary Care Provider Office Phone # Fax #    Dickson Reich [phone] [address] [facility] [address]        Equal Access to Services     [facility]: Hadii aad ku hadasho Soomaali, waaxda luqadaha, qaybta kaalmada adeegyada, melita núñez haymarge shaw . So [facility] [phone].    ATENCIÓN: Si habla español, tiene a nolan disposición servicios gratuitos de asistencia lingüística. Llame al [phone].    We comply with applicable federal civil rights laws and [place] laws. We do not discriminate on the basis of race, color, national origin, age, disability, sex, sexual orientation, or gender identity.            Thank you!     Thank you for choosing [facility]CER CLINIC AND [facility] CENTER  for your care. Our goal is always to provide you with excellent care. Hearing back from our patients is one way we can continue to improve our services. Please take a few minutes to complete the written survey that you may receive in the mail after your visit with us. Thank you!             Your Updated Medication List - Protect others around you: Learn how to safely use, store and throw away your medicines at www.disposemymeds.org.          This list is accurate as of 2/15/18 12:52 PM.  Always use your most recent med list.                   Brand Name Dispense Instructions for use Diagnosis    acyclovir 400 MG tablet     ZOVIRAX    60 tablet    Take 1 tablet (400 mg) by mouth 2 times daily Viral Prophylaxis.    Multiple myeloma not having achieved remission (H)       amLODIPine 5 MG tablet    NORVASC    90 tablet    TAKE 1 TABLET BY MOUTH DAILY    Essential hypertension, benign       * B-D U/F 31G X 8 MM   Generic drug:  insulin pen needle           * B-D U/F 31G X 8 MM   Generic drug:  insulin pen needle     500 each    USE 5 PEN NEEDLES PER DAY OR AS DIRECTED    Type 2 diabetes mellitus with stage 3 chronic kidney disease, with long-term current use of insulin (H)       Blood Glucose Monitoring Suppl Misc      3 times daily (before meals)        dexamethasone 4 MG tablet    DECADRON    40 tablet    Take 5 tablets (20 mg) by mouth with food. Days 1, 2, 8, 9, 15, 16, 22, and 23. On carfilzomib days, take prior to carfilzomib dose.    Multiple myeloma not having achieved remission (H)       ferrous sulfate 325 (65 FE) MG tablet    IRON     Take 325 mg by mouth daily (with breakfast)        FLOMAX 0.4 MG capsule   Generic drug:  tamsulosin      Take 0.4 mg by mouth At Bedtime        * FREESTYLE LITE test strip   Generic drug:  blood glucose monitoring     200 strip    USE 1 STRIP TO TEST TWICE DAILY.    Uncontrolled type 1 diabetes mellitus with stage 3 chronic kidney disease (H)       * FREESTYLE LITE test strip   Generic drug:  blood glucose monitoring     400 strip    USE TO TEST FOUR TIMES DAILY    Uncontrolled type 1 diabetes mellitus with stage 3 chronic kidney disease (H)       furosemide 20 MG tablet    LASIX    90 tablet    Take 1 tablet (20 mg) by mouth daily    Multiple myeloma not having achieved remission (H)       gemfibrozil 600 MG tablet    LOPID    180 tablet    TAKE 1 TABLET BY MOUTH TWICE DAILY    Hyperlipidemia       * INSULIN ASPART SC      Inject Subcutaneous every morning Inject 6 unit subcutaneously one time a day for Diabetes II 100unit/mL before breakfast        * INSULIN ASPART SC      Inject Subcutaneous  daily (before lunch) Inject 5 unit subcutaneously one time a day for Diabetes II before lunch        * INSULIN ASPART SC      Inject Subcutaneous At Bedtime Inject 4 unit subcutaneously at bedtime for Diabetes II Ok to use Humalog insulin with same order details        * insulin aspart 100 UNIT/ML injection    NovoLOG FLEXPEN    60 mL    Inject 4-17 units under the skin four times daily (with meals and nightly)    Uncontrolled type 2 diabetes mellitus with hyperglycemia, unspecified long term insulin use status (H), Type 2 diabetes mellitus with diabetic chronic kidney disease, unspecified CKD stage, unspecified long term insulin use status (H), Type 2 diabetes mellitus with stage 3 chronic kidney disease, with long-term current use of insulin (H)       insulin detemir 100 UNIT/ML injection    LEVEMIR FLEXPEN/FLEXTOUCH    15 mL    Inject 16 Units Subcutaneous every morning (before breakfast)    Type 2 diabetes mellitus with diabetic chronic kidney disease, unspecified CKD stage, unspecified long term insulin use status (H), Uncontrolled type 2 diabetes mellitus with hyperglycemia, unspecified long term insulin use status (H), Type 2 diabetes mellitus with stage 3 chronic kidney disease, with long-term current use of insulin (H)       levothyroxine 25 MCG tablet    SYNTHROID/LEVOTHROID    90 tablet    TAKE 1 TABLET BY MOUTH EVERY DAY    Acquired hypothyroidism       LISINOPRIL PO      Take 15 mg by mouth daily        metoprolol tartrate 25 MG tablet    LOPRESSOR    180 tablet    TAKE 1 TABLET BY MOUTH TWICE DAILY    Essential hypertension       omeprazole 20 MG CR capsule    priLOSEC    90 capsule    TAKE 1 CAPSULE BY MOUTH EVERY DAY    Congenital hiatus hernia       ondansetron 8 MG tablet    ZOFRAN    10 tablet    Take 1 tablet (8 mg) by mouth every 8 hours as needed (Nausea/Vomiting)    Multiple myeloma not having achieved remission (H)       polyethylene glycol Packet    MIRALAX/GLYCOLAX    7 packet    Take 17 g  by mouth 2 times daily as needed (constipation)    Slow transit constipation       Selenium 200 MCG Tabs      Take 100 mcg by mouth daily. Pt takes one half tab of the 200 mcg daily        VITAMIN C PO      Take 1,000 mg by mouth daily        VITAMIN D (CHOLECALCIFEROL) PO      Take 1,000 Units by mouth        * Notice:  This list has 8 medication(s) that are the same as other medications prescribed for you. Read the directions carefully, and ask your doctor or other care provider to review them with you.

## 2018-02-15 NOTE — PROGRESS NOTES
Infusion Nursing Note:  Roc Parkinson presents today for C2D9 Kyprolis  Patient seen by provider today: No   present during visit today: Not Applicable.    Note: N/A.    Intravenous Access:  Peripheral IV in place from 2/14-site patent    Treatment Conditions:  Not Applicable.      Post Infusion Assessment:  Patient tolerated infusion without incident.  Blood return noted pre and post infusion.  Site patent and intact, free from redness, edema or discomfort.  No evidence of extravasations.  Access discontinued per protocol.    Discharge Plan:   Discharge instructions reviewed with: Patient.  Patient and/or family verbalized understanding of discharge instructions and all questions answered.  Copy of AVS reviewed with patient and/or family.  Patient will return 2/21 for next appointment.  Patient discharged in stable condition accompanied by: son.  Departure Mode: Ambulatory.    Keya Arias RN

## 2018-02-17 NOTE — MR AVS SNAPSHOT
After Visit Summary   2/17/2018    Roc Parkinson    MRN: [id number]           Patient Information     Date Of Birth          7/7/1926        Visit Information        Provider Department      2/17/2018 3:10 PM Maryanne Bhandari MD [facility]        Today's Diagnoses     Acute right-sided low back pain without sciatica    -  1    Dysuria           Follow-ups after your visit        Your next 10 appointments already scheduled     Feb 20, 2018  1:00 PM CST   Office Visit with Dickson Reich MD   [facility] ([facility])    [address] [place] [address]   [phone]           Bring a current list of meds and any records pertaining to this visit. For Physicals, please bring immunization records and any forms needing to be filled out. Please arrive 10 minutes early to complete paperwork.            Feb 21, 2018 11:00 AM CST   Level 2 with  INFUSION CHAIR 20   [facility] ([facility])    [facility]  [address]  [facility] [address]   [phone]            Feb 22, 2018 10:30 AM CST   Level 2 with  INFUSION CHAIR 20   [facility] ([facility])    [facility]  [address]  [facility] [address]   [phone]            Feb 22, 2018 11:00 AM CST   Return Visit with ALEXANDER Salazar CNP   [facility] ([facility])    [facility]  [address]  [facility] [address]   [phone]            Mar 07, 2018 11:30 AM CST   Level 2 with SH INFUSION CHAIR 14   [facility] ([facility])    [facility]  [address]  [facility] [address]   [phone]            Mar 08, 2018 11:30 AM CST  "  Level 2 with  INFUSION CHAIR 12   [facility] ([facility])    [facility]  [address]  [place] [address]   [phone]            Mar 08, 2018 12:30 PM CST   Return Visit with Gretel Quinn MD   [facility] ([facility])    [facility] [place] [address]  [place] [id number] [phone]              Who to contact     If you have questions or need follow up information about today's clinic visit or your schedule please contact [facility] directly at [phone].  Normal or non-critical lab and imaging results will be communicated to you by MyChart, letter or phone within 4 business days after the clinic has received the results. If you do not hear from us within 7 days, please contact the clinic through MyChart or phone. If you have a critical or abnormal lab result, we will notify you by phone as soon as possible.  Submit refill requests through [facility] or call your pharmacy and they will forward the refill request to us. Please allow 3 business days for your refill to be completed.          Additional Information About Your Visit        MyChart Information     [facility] lets you send messages to your doctor, view your test results, renew your prescriptions, schedule appointments and more. To sign up, go to [email or web address] . Click on \"Log in\" on the left side of the screen, which will take you to the Welcome page. Then click on \"Sign up Now\" on the right side of the page.     You will be asked to enter the access code listed below, as well as some personal information. Please follow the directions to create your username and password.     Your access code is: [email or web address]  Expires: 3/26/2018 11:10 AM     Your access code will  in 90 days. If you need help or a new code, please call your [facility] clinic or [phone].        Care " EveryWhere ID     This is your Care EveryWhere ID. This could be used by other organizations to access your [facility] medical records  [id number]        Your Vitals Were     Pulse Temperature Pulse Oximetry BMI (Body Mass Index)          80 97.8  F (36.6  C) 95% 29.36 kg/m2         Blood Pressure from Last 3 Encounters:   02/17/18 130/60   02/15/18 163/76   02/14/18 144/72    Weight from Last 3 Encounters:   02/17/18 204 lb 9.6 oz (92.8 kg)   02/14/18 202 lb 9.6 oz (91.9 kg)   02/08/18 206 lb (93.4 kg)              We Performed the Following     UA with Microscopic          Today's Medication Changes          These changes are accurate as of 2/17/18  4:34 PM.  If you have any questions, ask your nurse or doctor.               Start taking these medicines.        Dose/Directions    ciprofloxacin 500 MG tablet   Commonly known as:  CIPRO   Used for:  Acute right-sided low back pain without sciatica, Dysuria   Started by:  Maryanne Bhandari MD        Dose:  500 mg   Take 1 tablet (500 mg) by mouth 2 times daily for 7 days   Quantity:  14 tablet   Refills:  0       methocarbamol 750 MG tablet   Commonly known as:  ROBAXIN   Used for:  Acute right-sided low back pain without sciatica   Started by:  Maryanne Bhandari MD        Dose:  750 mg   Take 1 tablet (750 mg) by mouth 3 times daily as needed for muscle spasms   Quantity:  30 tablet   Refills:  0            Where to get your medicines      These medications were sent to [facility] [address] [address]     Phone:  [phone]     ciprofloxacin 500 MG tablet    methocarbamol 750 MG tablet                Primary Care Provider Office Phone # Fax #    Dickson Reich [phone] [address]  [facility] [address]        Equal Access to Services     ELIE GAGNON AH: Jeanne hsieh Soaimee, waaxda luqadaha, qaybta kamelita feliciano  la'sbn michael. So [facility] [phone].    ATENCIÓN: Si habla angelo, tiene a nolan disposición servicios gratuitos de asistencia lingüística. Eric fong [phone].    We comply with applicable federal civil rights laws and [place] laws. We do not discriminate on the basis of race, color, national origin, age, disability, sex, sexual orientation, or gender identity.            Thank you!     Thank you for choosing [facility]  for your care. Our goal is always to provide you with excellent care. Hearing back from our patients is one way we can continue to improve our services. Please take a few minutes to complete the written survey that you may receive in the mail after your visit with us. Thank you!             Your Updated Medication List - Protect others around you: Learn how to safely use, store and throw away your medicines at www.disposemymeds.org.          This list is accurate as of 2/17/18  4:34 PM.  Always use your most recent med list.                   Brand Name Dispense Instructions for use Diagnosis    amLODIPine 5 MG tablet    NORVASC    90 tablet    TAKE 1 TABLET BY MOUTH DAILY    Essential hypertension, benign       * B-D U/F 31G X 8 MM   Generic drug:  insulin pen needle           * B-D U/F 31G X 8 MM   Generic drug:  insulin pen needle     500 each    USE 5 PEN NEEDLES PER DAY OR AS DIRECTED    Type 2 diabetes mellitus with stage 3 chronic kidney disease, with long-term current use of insulin (H)       Blood Glucose Monitoring Suppl Misc      3 times daily (before meals)        ciprofloxacin 500 MG tablet    CIPRO    14 tablet    Take 1 tablet (500 mg) by mouth 2 times daily for 7 days    Acute right-sided low back pain without sciatica, Dysuria       dexamethasone 4 MG tablet    DECADRON    40 tablet    Take 5 tablets (20 mg) by mouth with food. Days 1, 2, 8, 9, 15, 16, 22, and 23. On carfilzomib days, take prior to carfilzomib dose.    Multiple myeloma not having achieved  remission (H)       ferrous sulfate 325 (65 FE) MG tablet    IRON     Take 325 mg by mouth daily (with breakfast)        FLOMAX 0.4 MG capsule   Generic drug:  tamsulosin      Take 0.4 mg by mouth At Bedtime        * FREESTYLE LITE test strip   Generic drug:  blood glucose monitoring     200 strip    USE 1 STRIP TO TEST TWICE DAILY.    Uncontrolled type 1 diabetes mellitus with stage 3 chronic kidney disease (H)       * FREESTYLE LITE test strip   Generic drug:  blood glucose monitoring     400 strip    USE TO TEST FOUR TIMES DAILY    Uncontrolled type 1 diabetes mellitus with stage 3 chronic kidney disease (H)       furosemide 20 MG tablet    LASIX    90 tablet    Take 1 tablet (20 mg) by mouth daily    Multiple myeloma not having achieved remission (H)       gemfibrozil 600 MG tablet    LOPID    180 tablet    TAKE 1 TABLET BY MOUTH TWICE DAILY    Hyperlipidemia       * INSULIN ASPART SC      Inject Subcutaneous every morning Inject 6 unit subcutaneously one time a day for Diabetes II 100unit/mL before breakfast        * INSULIN ASPART SC      Inject Subcutaneous daily (before lunch) Inject 5 unit subcutaneously one time a day for Diabetes II before lunch        * INSULIN ASPART SC      Inject Subcutaneous At Bedtime Inject 4 unit subcutaneously at bedtime for Diabetes II Ok to use Humalog insulin with same order details        * insulin aspart 100 UNIT/ML injection    NovoLOG FLEXPEN    60 mL    Inject 4-17 units under the skin four times daily (with meals and nightly)    Uncontrolled type 2 diabetes mellitus with hyperglycemia, unspecified long term insulin use status (H), Type 2 diabetes mellitus with diabetic chronic kidney disease, unspecified CKD stage, unspecified long term insulin use status (H), Type 2 diabetes mellitus with stage 3 chronic kidney disease, with long-term current use of insulin (H)       insulin detemir 100 UNIT/ML injection    LEVEMIR FLEXPEN/FLEXTOUCH    15 mL    Inject 16 Units  Subcutaneous every morning (before breakfast)    Type 2 diabetes mellitus with diabetic chronic kidney disease, unspecified CKD stage, unspecified long term insulin use status (H), Uncontrolled type 2 diabetes mellitus with hyperglycemia, unspecified long term insulin use status (H), Type 2 diabetes mellitus with stage 3 chronic kidney disease, with long-term current use of insulin (H)       levothyroxine 25 MCG tablet    SYNTHROID/LEVOTHROID    90 tablet    TAKE 1 TABLET BY MOUTH EVERY DAY    Acquired hypothyroidism       LISINOPRIL PO      Take 15 mg by mouth daily        methocarbamol 750 MG tablet    ROBAXIN    30 tablet    Take 1 tablet (750 mg) by mouth 3 times daily as needed for muscle spasms    Acute right-sided low back pain without sciatica       metoprolol tartrate 25 MG tablet    LOPRESSOR    180 tablet    TAKE 1 TABLET BY MOUTH TWICE DAILY    Essential hypertension       omeprazole 20 MG CR capsule    priLOSEC    90 capsule    TAKE 1 CAPSULE BY MOUTH EVERY DAY    Congenital hiatus hernia       polyethylene glycol Packet    MIRALAX/GLYCOLAX    7 packet    Take 17 g by mouth 2 times daily as needed (constipation)    Slow transit constipation       Selenium 200 MCG Tabs      Take 100 mcg by mouth daily. Pt takes one half tab of the 200 mcg daily        VITAMIN C PO      Take 1,000 mg by mouth daily        VITAMIN D (CHOLECALCIFEROL) PO      Take 1,000 Units by mouth        * Notice:  This list has 8 medication(s) that are the same as other medications prescribed for you. Read the directions carefully, and ask your doctor or other care provider to review them with you.

## 2018-02-17 NOTE — PROGRESS NOTES
SUBJECTIVE  HPI: Roc Parkinson is a 89 year old male who presents for evaluation of back pain  Symptoms began 7 day(s) ago, have been onset gradual and are worse.  Pain is located in the low back right region, with radiation to does not radiate, and are at worst a 7 on a scale of 1-10.  Recent injury:none recalled by the patient  Personal hx of back pain is no prior back problems.  Pain is exacerbated by: changing position.  Pain is relieved by: Tylenol[unfilled] sx include: none.  Red flag symptoms: negative.    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     Current Outpatient Prescriptions   Medication Sig Dispense Refill     insulin aspart (NOVOLOG FLEXPEN) 100 UNIT/ML injection Inject 4-17 units under the skin four times daily (with meals and nightly) 60 mL 1     levothyroxine (SYNTHROID/LEVOTHROID) 25 MCG tablet TAKE 1 TABLET BY MOUTH EVERY DAY 90 tablet 0     VITAMIN D, CHOLECALCIFEROL, PO Take 1,000 Units by mouth       tamsulosin (FLOMAX) 0.4 MG capsule Take 0.4 mg by mouth At Bedtime       polyethylene glycol (MIRALAX/GLYCOLAX) Packet Take 17 g by mouth 2 times daily as needed (constipation) 7 packet      insulin detemir (LEVEMIR FLEXPEN/FLEXTOUCH) 100 UNIT/ML injection Inject 16 Units Subcutaneous every morning (before breakfast) 15 mL 1     LISINOPRIL PO Take 15 mg by mouth daily       furosemide (LASIX) 20 MG tablet Take 1 tablet (20 mg) by mouth daily 90 tablet 1     gemfibrozil (LOPID) 600 MG tablet TAKE 1 TABLET BY MOUTH TWICE DAILY 180 tablet 1     omeprazole (PRILOSEC) 20 MG CR capsule TAKE 1 CAPSULE BY MOUTH EVERY DAY 90 capsule 3     Ascorbic Acid (VITAMIN C PO) Take 1,000 mg by mouth daily       ferrous sulfate (IRON) 325 (65 FE) MG tablet Take 325 mg by mouth  daily (with breakfast)       Selenium 200 MCG TABS Take 100 mcg by mouth daily. Pt takes one half tab of the 200 mcg daily        FREESTYLE LITE test strip USE TO TEST FOUR TIMES DAILY 400 strip 0     INSULIN ASPART SC Inject Subcutaneous every morning Inject 6 unit subcutaneously one time a day for Diabetes II 100unit/mL before breakfast       INSULIN ASPART SC Inject Subcutaneous daily (before lunch) Inject 5 unit subcutaneously one time a day for Diabetes II before lunch       INSULIN ASPART SC Inject Subcutaneous At Bedtime Inject 4 unit subcutaneously at bedtime for Diabetes II Ok to use Humalog insulin with same order details       dexamethasone (DECADRON) 4 MG tablet Take 5 tablets (20 mg) by mouth with food. Days 1, 2, 8, 9, 15, 16, 22, and 23. On carfilzomib days, take prior to carfilzomib dose. 40 tablet 0     metoprolol tartrate (LOPRESSOR) 25 MG tablet TAKE 1 TABLET BY MOUTH TWICE DAILY (Patient not taking: Reported on 2/17/2018) 180 tablet 0     Blood Glucose Monitoring Suppl MISC 3 times daily (before meals)        amLODIPine (NORVASC) 5 MG tablet TAKE 1 TABLET BY MOUTH DAILY (Patient not taking: Reported on 2/17/2018) 90 tablet 3     [DISCONTINUED] cyclophosphamide (CYTOXAN) 50 MG capsule CHEMO Take 6 capsules (300 mg) by mouth once a week 24 capsule 0     B-D U/F 31G X 8 MM insulin pen needle USE 5 PEN NEEDLES PER DAY OR AS DIRECTED 500 each 1     FREESTYLE LITE test strip USE 1 STRIP TO TEST TWICE DAILY. 200 strip 1     B-D U/F 31G X 8 MM insulin pen needle        Social History   Substance Use Topics     Smoking status: Never Smoker     Smokeless tobacco: Never Used     Alcohol use No       ROS:  Review of systems negative except as stated above.    OBJECTIVE:  /60  Pulse 80  Temp 97.8  F (36.6  C)  Wt 204 lb 9.6 oz (92.8 kg)  SpO2 95%  BMI 29.36 kg/m2  Back examination: Back symmetric, no curvature. ROM normal. No CVA tenderness.  [unfilled] leg raise test: negative  GENERAL APPEARANCE:  healthy, alert and no distress  RESP: lungs clear to auscultation - no rales, rhonchi or wheezes  MSK- tenderness noted in the rt lower back   CV: regular rates and rhythm, normal S1 S2, no murmur noted  ABDOMEN:  soft, nontender, no HSM or masses and bowel sounds normal  NEURO: Normal strength and tone with no weakness or sensory deficit noted, reflexes normal , DTR diminished bilat   SKIN: no suspicious lesions or rashes    ASSESSMENT/IMPRESSION:  Roc was seen today for back pain and urgent care.    Diagnoses and all orders for this visit:    Acute right-sided low back pain without sciatica  -     UA with Microscopic  -     ciprofloxacin (CIPRO) 500 MG tablet; Take 1 tablet (500 mg) by mouth 2 times daily for 7 days  -     methocarbamol (ROBAXIN) 750 MG tablet; Take 1 tablet (750 mg) by mouth 3 times daily as needed for muscle spasms    Dysuria  -     ciprofloxacin (CIPRO) 500 MG tablet; Take 1 tablet (500 mg) by mouth 2 times daily for 7 days          PLAN:1) PLEASE SEE ORDER SUMMARY  [unfilled]:      1.  Continue stretching and strengthening exercises.       2.  Continue prn heat or ice application.  Discussed if pain does not get better in 2 days should follow up   Also advised to use topical capsaicin ointment but should wash hands after use     Follow up if  symptoms fail to improve or worsens   Pt understood and agreed with plan     did spent>25 minutes with patient and > 50% of the time was for answering questions, discussing findings, counseling and coordination of care

## 2018-02-20 NOTE — PROGRESS NOTES
SUBJECTIVE:   Roc Parkinson is a 89 year old male who presents to clinic today for the following health issues:          Hospital Follow-up Visit:    Hospital/Nursing Home/IP Rehab Facility:[facility] then to  [facility]  Date of Admission: [facility] 1/20/18-1/23/18  Date of Discharge: 2/7/18  from TCU  Reason(s) for Admission: tachycardia. Anemia in neoplastic disease            Problems taking medications regularly:  None       Medication changes since discharge: None       Problems adhering to non-medication therapy:  None      Summary of hospitalization:  [facility] discharge summary reviewed  Diagnostic Tests/Treatments reviewed.  Follow up needed: labs  Other Healthcare Providers Involved in Patient s Care:         None  Update since discharge: improved.     Post Discharge Medication Reconciliation: discharge medications reconciled, continue medications without change.  Plan of care communicated with patient     Coding guidelines for this visit:  Type of Medical   Decision Making Face-to-Face Visit       within 7 Days of discharge Face-to-Face Visit        within 14 days of discharge   Moderate Complexity [id number]   High Complexity [id number]              Diabetes Follow-up    Patient is checking blood sugars: four times daily.    Blood sugar testing frequency justification: Uncontrolled diabetes  Results are as follows:        Diabetic concerns: None     Symptoms of hypoglycemia (low blood sugar): none     Paresthesias (numbness or burning in feet) or sores: No     Date of last diabetic eye exam: 2018    BP Readings from Last 2 Encounters:   02/20/18 124/60   02/17/18 130/60     Hemoglobin A1C (%)   Date Value   01/08/2018 6.3 (H)   05/23/2017 8.0 (H)     LDL Cholesterol Calculated (mg/dL)   Date Value   05/23/2017 64   05/09/2016 61     Hypertension Follow-up      Outpatient blood pressures are being checked at home.  Results are normal.    Low Salt Diet: no added  salt      Problem list and histories reviewed & adjusted, as indicated.  Additional history: as documented    Patient Active Problem List   Diagnosis     Dysphagia     Malignant neoplasm of prostate (H)     Malignant neoplasm of bladder (H)     Essential hypertension, benign     Asymptomatic varicose veins     Congenital hiatus hernia     Oral lesion     CTS (carpal tunnel syndrome)     Irritable bowel syndrome     Vitamin D deficiency disease     Microalbuminuria     Obesity     UTI (urinary tract infection) w hx of recurrence     Iron deficiency anemia     Nonsmoker     A-fib (H)     Health Care Home     Hyperlipidemia     CKD (chronic kidney disease) stage 3, GFR 30-59 ml/min     Hypothyroidism     Long-term (current) use of anticoagulants [Z79.01]     Macrocytic anemia     GIB (gastrointestinal bleeding)     Multiple myeloma not having achieved remission (H)     Hypercalcemia     Hyperglycemia     Urinary tract infection     Hyperkalemia     ACP (advance care planning)     MDS (myelodysplastic syndrome) (H)     Chemotherapy-induced neutropenia (H)     Anemia in neoplastic disease     Type 2 diabetes mellitus with stage 3 chronic kidney disease, with long-term current use of insulin (H)     UTI due to extended-spectrum beta lactamase (ESBL) producing Escherichia coli     Essential hypertension     Physical deconditioning     Past Surgical History:   Procedure Laterality Date     ARTHROPLASTY KNEE  11/5/2012    Procedure: ARTHROPLASTY KNEE;  RIGHT TOTAL KNEE ARTHROPLASTY (SMITH & NEPHEW)^;  Surgeon: Dickson Schulte MD;  Location:  OR     BIOPSY      bladder     COLONOSCOPY  2007     COLONOSCOPY N/A 11/15/2016    Procedure: COMBINED COLONOSCOPY, SINGLE OR MULTIPLE BIOPSY/POLYPECTOMY BY BIOPSY;  Surgeon: Kenneth Fulton MD;  Location:  GI     GENITOURINARY SURGERY      TURP x2 and bladder scraping     GI SURGERY      anal fistula     ORTHOPEDIC SURGERY      lt knee in nov.2009     PHACOEMULSIFICATION  CLEAR CORNEA WITH STANDARD INTRAOCULAR LENS IMPLANT Left 10/25/2017    Procedure: PHACOEMULSIFICATION CLEAR CORNEA WITH STANDARD INTRAOCULAR LENS IMPLANT;  LEFT EYE PHACOEMULSIFICATION CLEAR CORNEA WITH STANDARD INTRAOCULAR LENS IMPLANT ;  Surgeon: Shady Haider MD;  Location: [facility]     PHACOEMULSIFICATION CLEAR CORNEA WITH STANDARD INTRAOCULAR LENS IMPLANT Right 11/1/2017    Procedure: PHACOEMULSIFICATION CLEAR CORNEA WITH STANDARD INTRAOCULAR LENS IMPLANT;  RIGHT EYE PHACOEMULSIFICATION CLEAR CORNEA WITH STANDARD INTRAOCULAR LENS IMPLANT;  Surgeon: Shady Haider MD;  Location: [facility]     SOFT TISSUE SURGERY      melanoma       Social History   Substance Use Topics     Smoking status: Never Smoker     Smokeless tobacco: Never Used     Alcohol use No     Family History   Problem Relation Age of Onset     Cardiovascular Father 81     heart arrythmia     Endocrine Disease Brother 74     Paget's           Reviewed and updated as needed this visit by clinical staff  Tobacco  Allergies  Meds  Med Hx  Surg Hx  Fam Hx  Soc Hx      Reviewed and updated as needed this visit by Provider         ROS:  Constitutional, HEENT, cardiovascular, pulmonary, gi and gu systems are negative, except as otherwise noted.    OBJECTIVE:                                                    /60  Pulse 80  Temp 98  F (36.7  C) (Tympanic)  Resp 16  Wt 205 lb (93 kg)  SpO2 98%  BMI 29.41 kg/m2  Body mass index is 29.41 kg/(m^2).  GENERAL APPEARANCE: healthy, alert and no distress  RESP: lungs clear to auscultation - no rales, rhonchi or wheezes  CV: regular rates and rhythm, normal S1 S2, no S3 or S4 and no murmur, click or rub         ASSESSMENT/PLAN:                                                        ICD-10-CM    1. CKD (chronic kidney disease) stage 3, GFR 30-59 ml/min N18.3 UA with Microscopic     Electrolyte panel (Na, K, Cl, CO2, Anion gap)     Creatinine     Urea nitrogen   2. Type 2 diabetes mellitus with stage  3 chronic kidney disease, with long-term current use of insulin (H) E11.22 UA with Microscopic    N18.3     Z79.4    3. Essential hypertension I10    4. Anemia in neoplastic disease D63.0 CBC with platelets differential   5. MDS (myelodysplastic syndrome) (H) D46.9    6. Multiple myeloma not having achieved remission (H) C90.00        Patient Instructions   The patient was supposed to get outpatient physical therapy.  He has declined that.  His last session with physical therapy did not go well and he thinks he is doing well without it.  He has also got a lot of other appointments to go to with oncology and us.  So at the present time we will forego physical therapy.  He is checking his blood sugars 4 times daily getting anywhere from .  He does need to get his CBC and urinalysis and electrolytes and kidney function done today.  We will plan follow-up after review of those tests.      Dickson Reich MD  [facility]

## 2018-02-20 NOTE — LETTER
February 21, 2018      Roc SILVA Tesha  [address]   [facility] [address]        Dear ,    We are writing to inform you of your test results.    This is all very stable and can be repeated in 1 month.    Resulted Orders   UA with Microscopic   Result Value Ref Range    Color Urine Yellow     Appearance Urine Clear     Glucose Urine Negative NEG^Negative mg/dL    Bilirubin Urine Negative NEG^Negative    Ketones Urine Negative NEG^Negative mg/dL    Specific Gravity Urine 1.020 1.003 - 1.035    pH Urine 5.0 5.0 - 7.0 pH    Protein Albumin Urine 30 (A) NEG^Negative mg/dL    Urobilinogen Urine 0.2 0.2 - 1.0 EU/dL    Nitrite Urine Negative NEG^Negative    Blood Urine Negative NEG^Negative    Leukocyte Esterase Urine Small (A) NEG^Negative    Source Midstream Urine     WBC Urine 2-5 (A) OTO2^O - 2 /HPF    RBC Urine O - 2 OTO2^O - 2 /HPF   CBC with platelets differential   Result Value Ref Range    WBC 5.3 4.0 - 11.0 10e9/L    RBC Count 2.37 (L) 4.4 - 5.9 10e12/L      Comment:      ALL ABNORMAL RESULTS COMPARE TO PREVIOUS DKR    Hemoglobin 9.1 (L) 13.3 - 17.7 g/dL    Hematocrit 28.1 (L) 40.0 - 53.0 %     (H) 78 - 100 fl    MCH 38.4 (H) 26.5 - 33.0 pg    MCHC 32.4 31.5 - 36.5 g/dL    RDW 17.4 (H) 10.0 - 15.0 %    Platelet Count 88 (L) 150 - 450 10e9/L    Diff Method Automated Method     % Neutrophils 79.3 %    % Lymphocytes 11.0 %    % Monocytes 9.1 %    % Eosinophils 0.6 %    % Basophils 0.0 %    Absolute Neutrophil 4.2 1.6 - 8.3 10e9/L    Absolute Lymphocytes 0.6 (L) 0.8 - 5.3 10e9/L    Absolute Monocytes 0.5 0.0 - 1.3 10e9/L    Absolute Eosinophils 0.0 0.0 - 0.7 10e9/L    Absolute Basophils 0.0 0.0 - 0.2 10e9/L   Electrolyte panel (Na, K, Cl, CO2, Anion gap)   Result Value Ref Range    Sodium 138 133 - 144 mmol/L    Potassium 4.3 3.4 - 5.3 mmol/L    Chloride 109 94 - 109 mmol/L    Carbon Dioxide 21 20 - 32 mmol/L    Anion Gap 8 3 - 14 mmol/L   Creatinine   Result Value Ref Range     Creatinine 1.20 0.66 - 1.25 mg/dL    GFR Estimate 57 (L) >60 mL/min/1.7m2      Comment:      Non  GFR Calc    GFR Estimate If Black 69 >60 mL/min/1.7m2      Comment:       GFR Calc   Urea nitrogen   Result Value Ref Range    Urea Nitrogen 33 (H) 7 - 30 mg/dL       If you have any questions or concerns, please call the clinic at the number listed above.       Sincerely,        Dickson Reich MD

## 2018-02-20 NOTE — NURSING NOTE
"Chief Complaint   Patient presents with     Hospital F/U       Initial /60  Pulse 80  Temp 98  F (36.7  C) (Tympanic)  Resp 16  Wt 205 lb (93 kg)  SpO2 98%  BMI 29.41 kg/m2 Estimated body mass index is 29.41 kg/(m^2) as calculated from the following:    Height as of 2/14/18: 5' 10\" (1.778 m).    Weight as of this encounter: 205 lb (93 kg).  Medication Reconciliation: complete     Mariia Allen CMA      "

## 2018-02-20 NOTE — PATIENT INSTRUCTIONS
The patient was supposed to get outpatient physical therapy.  He has declined that.  His last session with physical therapy did not go well and he thinks he is doing well without it.  He has also got a lot of other appointments to go to with oncology and us.  So at the present time we will forego physical therapy.  He is checking his blood sugars 4 times daily getting anywhere from .  He does need to get his CBC and urinalysis and electrolytes and kidney function done today.  We will plan follow-up after review of those tests.

## 2018-02-20 NOTE — MR AVS SNAPSHOT
After Visit Summary   2/20/2018    Roc Parkinson    MRN: [id number]           Patient Information     Date Of Birth          7/7/1926        Visit Information        Provider Department      2/20/2018 1:00 PM Dickson Reich MD [facility]        Today's Diagnoses     CKD (chronic kidney disease) stage 3, GFR 30-59 ml/min    -  1    Type 2 diabetes mellitus with stage 3 chronic kidney disease, with long-term current use of insulin (H)        Essential hypertension        Anemia in neoplastic disease        MDS (myelodysplastic syndrome) (H)        Multiple myeloma not having achieved remission (H)          Care Instructions    The patient was supposed to get outpatient physical therapy.  He has declined that.  His last session with physical therapy did not go well and he thinks he is doing well without it.  He has also got a lot of other appointments to go to with oncology and us.  So at the present time we will forego physical therapy.  He is checking his blood sugars 4 times daily getting anywhere from .  He does need to get his CBC and urinalysis and electrolytes and kidney function done today.  We will plan follow-up after review of those tests.          Follow-ups after your visit        Your next 10 appointments already scheduled     Feb 21, 2018 11:00 AM CST   Level 2 with SH INFUSION CHAIR 20   [facility] ([facility])    [facility]  [address]  [facility] [address]   [phone]            Feb 22, 2018 10:30 AM CST   Level 2 with SH INFUSION CHAIR 20   [facility] ([facility])    [facility]  [address]  [facility] [address]   [phone]            Feb 22, 2018 11:00 AM CST   Return Visit with ALEXANDER Salazar CNP   [facility] ([facility])    [facility]  "[facility]tr [facility]  [address]  [place] [address]   [phone]            Mar 07, 2018 11:30 AM CST   Level 2 with SH INFUSION CHAIR 14   [facility] ([facility])    [facility] [place]  [address]  [place] [address]   [phone]            Mar 08, 2018 11:30 AM CST   Level 2 with SH INFUSION CHAIR 12   [facility] ([facility])    [facility] [place]  [address]  [place] [address]   [phone]            Mar 08, 2018 12:30 PM CST   Return Visit with Gretel Quinn MD   [facility] ([facility])    [facility]  [address]  [place] [address]   [phone]              Who to contact     If you have questions or need follow up information about today's clinic visit or your schedule please contact [facility] directly at [phone].  Normal or non-critical lab and imaging results will be communicated to you by MyChart, letter or phone within 4 business days after the clinic has received the results. If you do not hear from us within 7 days, please contact the clinic through [employer] or phone. If you have a critical or abnormal lab result, we will notify you by phone as soon as possible.  Submit refill requests through [facility] or call your pharmacy and they will forward the refill request to us. Please allow 3 business days for your refill to be completed.          Additional Information About Your Visit        [employer] Information     [facility] lets you send messages to your doctor, view your test results, renew your prescriptions, schedule appointments and more. To sign up, go to [email or web address] . Click on \"Log in\" on the left side of the screen, which will take you to the Welcome page. Then click on \"Sign up Now\" on the right side of the " page.     You will be asked to enter the access code listed below, as well as some personal information. Please follow the directions to create your username and password.     Your access code is: [email or web address]  Expires: 3/26/2018 11:10 AM     Your access code will  in 90 days. If you need help or a new code, please call your [place] clinic or [phone].        Care EveryWhere ID     This is your Care EveryWhere ID. This could be used by other organizations to access your [place] medical records  [id number]        Your Vitals Were     Pulse Temperature Respirations Pulse Oximetry BMI (Body Mass Index)       80 98  F (36.7  C) (Tympanic) 16 98% 29.41 kg/m2        Blood Pressure from Last 3 Encounters:   18 124/60   18 130/60   02/15/18 163/76    Weight from Last 3 Encounters:   18 205 lb (93 kg)   18 204 lb 9.6 oz (92.8 kg)   18 202 lb 9.6 oz (91.9 kg)              We Performed the Following     CBC with platelets differential     Creatinine     Electrolyte panel (Na, K, Cl, CO2, Anion gap)     UA with Microscopic     Urea nitrogen        Primary Care Provider Office Phone # Fax #    Dickson Reich [phone] [address] [facility] [address]        Equal Access to Services     [facility] HospitalAUDELIA : Hadii aad ku hadasho Soomaali, waaxda luqadaha, qaybta kaalmada adeegyada, melita shaw . So [facility] [phone].    ATENCIÓN: Si habla español, tiene a nolan disposición servicios gratuitos de asistencia lingüística. Llame al [phone].    We comply with applicable federal civil rights laws and [place] laws. We do not discriminate on the basis of race, color, national origin, age, disability, sex, sexual orientation, or gender identity.            Thank you!     Thank you for choosing [facility]  for your care. Our goal is always to provide you with excellent care. Hearing back from our  patients is one way we can continue to improve our services. Please take a few minutes to complete the written survey that you may receive in the mail after your visit with us. Thank you!             Your Updated Medication List - Protect others around you: Learn how to safely use, store and throw away your medicines at www.disposemymeds.org.          This list is accurate as of 2/20/18  1:17 PM.  Always use your most recent med list.                   Brand Name Dispense Instructions for use Diagnosis    amLODIPine 5 MG tablet    NORVASC    90 tablet    TAKE 1 TABLET BY MOUTH DAILY    Essential hypertension, benign       * B-D U/F 31G X 8 MM   Generic drug:  insulin pen needle           * B-D U/F 31G X 8 MM   Generic drug:  insulin pen needle     500 each    USE 5 PEN NEEDLES PER DAY OR AS DIRECTED    Type 2 diabetes mellitus with stage 3 chronic kidney disease, with long-term current use of insulin (H)       Blood Glucose Monitoring Suppl Misc      3 times daily (before meals)        ciprofloxacin 500 MG tablet    CIPRO    14 tablet    Take 1 tablet (500 mg) by mouth 2 times daily for 7 days    Acute right-sided low back pain without sciatica, Dysuria       dexamethasone 4 MG tablet    DECADRON    40 tablet    Take 5 tablets (20 mg) by mouth with food. Days 1, 2, 8, 9, 15, 16, 22, and 23. On carfilzomib days, take prior to carfilzomib dose.    Multiple myeloma not having achieved remission (H)       ferrous sulfate 325 (65 FE) MG tablet    IRON     Take 325 mg by mouth daily (with breakfast)        FLOMAX 0.4 MG capsule   Generic drug:  tamsulosin      Take 0.4 mg by mouth At Bedtime        * FREESTYLE LITE test strip   Generic drug:  blood glucose monitoring     200 strip    USE 1 STRIP TO TEST TWICE DAILY.    Uncontrolled type 1 diabetes mellitus with stage 3 chronic kidney disease (H)       * FREESTYLE LITE test strip   Generic drug:  blood glucose monitoring     400 strip    USE TO TEST FOUR TIMES DAILY     Uncontrolled type 1 diabetes mellitus with stage 3 chronic kidney disease (H)       furosemide 20 MG tablet    LASIX    90 tablet    Take 1 tablet (20 mg) by mouth daily    Multiple myeloma not having achieved remission (H)       gemfibrozil 600 MG tablet    LOPID    180 tablet    TAKE 1 TABLET BY MOUTH TWICE DAILY    Hyperlipidemia       * INSULIN ASPART SC      Inject Subcutaneous every morning Inject 6 unit subcutaneously one time a day for Diabetes II 100unit/mL before breakfast        * INSULIN ASPART SC      Inject Subcutaneous daily (before lunch) Inject 5 unit subcutaneously one time a day for Diabetes II before lunch        * INSULIN ASPART SC      Inject Subcutaneous At Bedtime Inject 4 unit subcutaneously at bedtime for Diabetes II Ok to use Humalog insulin with same order details        * insulin aspart 100 UNIT/ML injection    NovoLOG FLEXPEN    60 mL    Inject 4-17 units under the skin four times daily (with meals and nightly)    Uncontrolled type 2 diabetes mellitus with hyperglycemia, unspecified long term insulin use status (H), Type 2 diabetes mellitus with diabetic chronic kidney disease, unspecified CKD stage, unspecified long term insulin use status (H), Type 2 diabetes mellitus with stage 3 chronic kidney disease, with long-term current use of insulin (H)       insulin detemir 100 UNIT/ML injection    LEVEMIR FLEXPEN/FLEXTOUCH    15 mL    Inject 16 Units Subcutaneous every morning (before breakfast)    Type 2 diabetes mellitus with diabetic chronic kidney disease, unspecified CKD stage, unspecified long term insulin use status (H), Uncontrolled type 2 diabetes mellitus with hyperglycemia, unspecified long term insulin use status (H), Type 2 diabetes mellitus with stage 3 chronic kidney disease, with long-term current use of insulin (H)       levothyroxine 25 MCG tablet    SYNTHROID/LEVOTHROID    90 tablet    TAKE 1 TABLET BY MOUTH EVERY DAY    Acquired hypothyroidism       LISINOPRIL PO       Take 15 mg by mouth daily        methocarbamol 750 MG tablet    ROBAXIN    30 tablet    Take 1 tablet (750 mg) by mouth 3 times daily as needed for muscle spasms    Acute right-sided low back pain without sciatica       metoprolol tartrate 25 MG tablet    LOPRESSOR    180 tablet    TAKE 1 TABLET BY MOUTH TWICE DAILY    Essential hypertension       omeprazole 20 MG CR capsule    priLOSEC    90 capsule    TAKE 1 CAPSULE BY MOUTH EVERY DAY    Congenital hiatus hernia       polyethylene glycol Packet    MIRALAX/GLYCOLAX    7 packet    Take 17 g by mouth 2 times daily as needed (constipation)    Slow transit constipation       Selenium 200 MCG Tabs      Take 100 mcg by mouth daily. Pt takes one half tab of the 200 mcg daily        VITAMIN C PO      Take 1,000 mg by mouth daily        VITAMIN D (CHOLECALCIFEROL) PO      Take 1,000 Units by mouth        * Notice:  This list has 8 medication(s) that are the same as other medications prescribed for you. Read the directions carefully, and ask your doctor or other care provider to review them with you.

## 2018-02-21 NOTE — MR AVS SNAPSHOT
After Visit Summary   2/21/2018    Roc Parkinson    MRN: [id number]           Patient Information     Date Of Birth          7/7/1926        Visit Information        Provider Department      2/21/2018 11:00 AM  INFUSION CHAIR 20 [facility]        Today's Diagnoses     Multiple myeloma not having achieved remission (H)    -  1    MDS (myelodysplastic syndrome) (H)           Follow-ups after your visit        Your next 10 appointments already scheduled     Feb 22, 2018 10:30 AM CST   Level 2 with  INFUSION CHAIR 20   [facility] ([facility])    [facility]  [address]  [place] [address]   [phone]            Feb 22, 2018 11:00 AM CST   Return Visit with ALEXANDER Salazar CNP   [facility] ([facility])    [facility]  [address]  [place] [address]   [phone]            Mar 07, 2018 11:30 AM CST   Level 2 with  INFUSION CHAIR 14   [facility] ([facility])    [facility]  [address]  [place] [address]   [phone]            Mar 08, 2018 11:30 AM CST   Level 2 with  INFUSION CHAIR 12   [facility] ([facility])    [facility]  [address]  [place] [address]   [phone]            Mar 08, 2018 12:30 PM CST   Return Visit with Gretel Quinn MD   [facility] ([facility])    [facility]  [address]  [place] [address]   [phone]              Who to contact     If you have questions or need follow up information about today's clinic visit or your schedule please contact [facility] directly at [phone].  Normal or non-critical lab and imaging  "results will be communicated to you by MyChart, letter or phone within 4 business days after the clinic has received the results. If you do not hear from us within 7 days, please contact the clinic through [employer] or phone. If you have a critical or abnormal lab result, we will notify you by phone as soon as possible.  Submit refill requests through [employer] or call your pharmacy and they will forward the refill request to us. Please allow 3 business days for your refill to be completed.          Additional Information About Your Visit        [employer] Information     [employer] lets you send messages to your doctor, view your test results, renew your prescriptions, schedule appointments and more. To sign up, go to [email or web address] . Click on \"Log in\" on the left side of the screen, which will take you to the Welcome page. Then click on \"Sign up Now\" on the right side of the page.     You will be asked to enter the access code listed below, as well as some personal information. Please follow the directions to create your username and password.     Your access code is: [email or web address]  Expires: 3/26/2018 11:10 AM     Your access code will  in 90 days. If you need help or a new code, please call your [place] clinic or [phone].        Care EveryWhere ID     This is your Care EveryWhere ID. This could be used by other organizations to access your [place] medical records  [id number]        Your Vitals Were     Pulse Temperature Respirations Height BMI (Body Mass Index)       72 98.1  F (36.7  C) (Oral) 16 1.778 m (5' 10\") 29.41 kg/m2        Blood Pressure from Last 3 Encounters:   18 143/64   18 124/60   18 130/60    Weight from Last 3 Encounters:   18 93 kg (205 lb)   18 93 kg (205 lb)   18 92.8 kg (204 lb 9.6 oz)              We Performed the Following     CBC with platelets differential        Primary Care Provider Office Phone # Fax #    Dickson Reich MD " [phone]       [address]  [facility] [address]        Equal Access to Services     ELIE GAGNON : Jeanne virginia wilson jori Soaimee, waemoryda luqadaha, qarafyta kaalmada paddy, melita albajanny michael. So [facility] [phone].    ATENCIÓN: Si habla español, tiene a nolan disposición servicios gratuitos de asistencia lingüística. Llame al [phone].    We comply with applicable federal civil rights laws and [place] laws. We do not discriminate on the basis of race, color, national origin, age, disability, sex, sexual orientation, or gender identity.            Thank you!     Thank you for choosing [facility]  for your care. Our goal is always to provide you with excellent care. Hearing back from our patients is one way we can continue to improve our services. Please take a few minutes to complete the written survey that you may receive in the mail after your visit with us. Thank you!             Your Updated Medication List - Protect others around you: Learn how to safely use, store and throw away your medicines at www.disposemymeds.org.          This list is accurate as of 2/21/18 12:40 PM.  Always use your most recent med list.                   Brand Name Dispense Instructions for use Diagnosis    amLODIPine 5 MG tablet    NORVASC    90 tablet    TAKE 1 TABLET BY MOUTH DAILY    Essential hypertension, benign       * B-D U/F 31G X 8 MM   Generic drug:  insulin pen needle           * B-D U/F 31G X 8 MM   Generic drug:  insulin pen needle     500 each    USE 5 PEN NEEDLES PER DAY OR AS DIRECTED    Type 2 diabetes mellitus with stage 3 chronic kidney disease, with long-term current use of insulin (H)       Blood Glucose Monitoring Suppl Misc      3 times daily (before meals)        ciprofloxacin 500 MG tablet    CIPRO    14 tablet    Take 1 tablet (500 mg) by mouth 2 times daily for 7 days    Acute right-sided low back pain without sciatica,  Dysuria       dexamethasone 4 MG tablet    DECADRON    40 tablet    Take 5 tablets (20 mg) by mouth with food. Days 1, 2, 8, 9, 15, 16, 22, and 23. On carfilzomib days, take prior to carfilzomib dose.    Multiple myeloma not having achieved remission (H)       ferrous sulfate 325 (65 FE) MG tablet    IRON     Take 325 mg by mouth daily (with breakfast)        FLOMAX 0.4 MG capsule   Generic drug:  tamsulosin      Take 0.4 mg by mouth At Bedtime        * FREESTYLE LITE test strip   Generic drug:  blood glucose monitoring     200 strip    USE 1 STRIP TO TEST TWICE DAILY.    Uncontrolled type 1 diabetes mellitus with stage 3 chronic kidney disease (H)       * FREESTYLE LITE test strip   Generic drug:  blood glucose monitoring     400 strip    USE TO TEST FOUR TIMES DAILY    Uncontrolled type 1 diabetes mellitus with stage 3 chronic kidney disease (H)       furosemide 20 MG tablet    LASIX    90 tablet    Take 1 tablet (20 mg) by mouth daily    Multiple myeloma not having achieved remission (H)       gemfibrozil 600 MG tablet    LOPID    180 tablet    TAKE 1 TABLET BY MOUTH TWICE DAILY    Hyperlipidemia       * INSULIN ASPART SC      Inject Subcutaneous every morning Inject 6 unit subcutaneously one time a day for Diabetes II 100unit/mL before breakfast        * INSULIN ASPART SC      Inject Subcutaneous daily (before lunch) Inject 5 unit subcutaneously one time a day for Diabetes II before lunch        * INSULIN ASPART SC      Inject Subcutaneous At Bedtime Inject 4 unit subcutaneously at bedtime for Diabetes II Ok to use Humalog insulin with same order details        * insulin aspart 100 UNIT/ML injection    NovoLOG FLEXPEN    60 mL    Inject 4-17 units under the skin four times daily (with meals and nightly)    Uncontrolled type 2 diabetes mellitus with hyperglycemia, unspecified long term insulin use status (H), Type 2 diabetes mellitus with diabetic chronic kidney disease, unspecified CKD stage, unspecified long  term insulin use status (H), Type 2 diabetes mellitus with stage 3 chronic kidney disease, with long-term current use of insulin (H)       insulin detemir 100 UNIT/ML injection    LEVEMIR FLEXPEN/FLEXTOUCH    15 mL    Inject 16 Units Subcutaneous every morning (before breakfast)    Type 2 diabetes mellitus with diabetic chronic kidney disease, unspecified CKD stage, unspecified long term insulin use status (H), Uncontrolled type 2 diabetes mellitus with hyperglycemia, unspecified long term insulin use status (H), Type 2 diabetes mellitus with stage 3 chronic kidney disease, with long-term current use of insulin (H)       levothyroxine 25 MCG tablet    SYNTHROID/LEVOTHROID    90 tablet    TAKE 1 TABLET BY MOUTH EVERY DAY    Acquired hypothyroidism       LISINOPRIL PO      Take 15 mg by mouth daily        methocarbamol 750 MG tablet    ROBAXIN    30 tablet    Take 1 tablet (750 mg) by mouth 3 times daily as needed for muscle spasms    Acute right-sided low back pain without sciatica       metoprolol tartrate 25 MG tablet    LOPRESSOR    180 tablet    TAKE 1 TABLET BY MOUTH TWICE DAILY    Essential hypertension       omeprazole 20 MG CR capsule    priLOSEC    90 capsule    TAKE 1 CAPSULE BY MOUTH EVERY DAY    Congenital hiatus hernia       polyethylene glycol Packet    MIRALAX/GLYCOLAX    7 packet    Take 17 g by mouth 2 times daily as needed (constipation)    Slow transit constipation       Selenium 200 MCG Tabs      Take 100 mcg by mouth daily. Pt takes one half tab of the 200 mcg daily        VITAMIN C PO      Take 1,000 mg by mouth daily        VITAMIN D (CHOLECALCIFEROL) PO      Take 1,000 Units by mouth        * Notice:  This list has 8 medication(s) that are the same as other medications prescribed for you. Read the directions carefully, and ask your doctor or other care provider to review them with you.

## 2018-02-21 NOTE — PROGRESS NOTES
Infusion Nursing Note:  Roc Parkinson presents today for Cycle 2 Day 15 Kyprolis and Aranesp.    Patient seen by provider today: No   present during visit today: Not Applicable.    Note: Per Dr. Quinn's last note, patient is to get Zometa every 12 weeks. Patient last had Zometa on January 10, 2018 and therefor    Intravenous Access:  Peripheral IV placed.    Treatment Conditions:  Lab Results   Component Value Date    HGB 8.7 02/21/2018     Lab Results   Component Value Date    WBC 4.9 02/21/2018      Lab Results   Component Value Date    ANEU 4.5 02/21/2018     Lab Results   Component Value Date     02/21/2018      Results reviewed, labs MET treatment parameters, ok to proceed with treatment.      Post Infusion Assessment:  Patient tolerated infusion without incident.  Patient tolerated injection without incident.  Blood return noted pre and post infusion.  Site patent and intact, free from redness, edema or discomfort.  No evidence of extravasations.  Access left in place for tomorrow's infusion.    Discharge Plan:   Patient declined prescription refills.  Discharge instructions reviewed with: Patient.  Patient verbalized understanding of discharge instructions and all questions answered.  AVS to patient via [employer].  Patient will return 2/22/18 for next appointment.   Patient discharged in stable condition accompanied by: self.  Departure Mode: Ambulatory.    Bere Bautista RN

## 2018-02-22 NOTE — MR AVS SNAPSHOT
After Visit Summary   2/22/2018    Roc Parkinson    MRN: [id number]           Patient Information     Date Of Birth          7/7/1926        Visit Information        Provider Department      2/22/2018 10:30 AM  INFUSION CHAIR 20 [facility]        Today's Diagnoses     Multiple myeloma not having achieved remission (H)    -  1       Follow-ups after your visit        Your next 10 appointments already scheduled     Mar 07, 2018 11:30 AM CST   Level 2 with  INFUSION CHAIR 14   [facility] ([facility])    [facility]  [address]  [place] [address]   [phone]            Mar 08, 2018 11:30 AM CST   Level 2 with  INFUSION CHAIR 12   [facility] ([facility])    [facility]  [address]  [place] [address]   [phone]            Mar 08, 2018 12:30 PM CST   Return Visit with Gretel Quinn MD   [facility] ([facility])    [facility]  [address]  [place] [address]   [phone]              Who to contact     If you have questions or need follow up information about today's clinic visit or your schedule please contact [facility] directly at [phone].  Normal or non-critical lab and imaging results will be communicated to you by MyChart, letter or phone within 4 business days after the clinic has received the results. If you do not hear from us within 7 days, please contact the clinic through MyChart or phone. If you have a critical or abnormal lab result, we will notify you by phone as soon as possible.  Submit refill requests through [employer] or call your pharmacy and they will forward the refill request to us. Please allow 3 business days for your refill to be completed.          Additional  "Information About Your Visit        MyChart Information     [employer] lets you send messages to your doctor, view your test results, renew your prescriptions, schedule appointments and more. To sign up, go to [email or web address] . Click on \"Log in\" on the left side of the screen, which will take you to the Welcome page. Then click on \"Sign up Now\" on the right side of the page.     You will be asked to enter the access code listed below, as well as some personal information. Please follow the directions to create your username and password.     Your access code is: [email or web address]  Expires: 3/26/2018 11:10 AM     Your access code will  in 90 days. If you need help or a new code, please call your [place] clinic or [phone].        Care EveryWhere ID     This is your Care EveryWhere ID. This could be used by other organizations to access your [place] medical records  [id number]        Your Vitals Were     Pulse Temperature Respirations Height Pulse Oximetry BMI (Body Mass Index)    69 97.6  F (36.4  C) (Oral) 16 1.778 m (5' 10\") 96% 29.73 kg/m2       Blood Pressure from Last 3 Encounters:   18 155/60   18 169/89   18 143/64    Weight from Last 3 Encounters:   18 93.9 kg (206 lb 15.7 oz)   18 94 kg (207 lb 3.2 oz)   18 93 kg (205 lb)              Today, you had the following     No orders found for display         Today's Medication Changes          These changes are accurate as of 18  1:05 PM.  If you have any questions, ask your nurse or doctor.               Stop taking these medicines if you haven't already. Please contact your care team if you have questions.     ciprofloxacin 500 MG tablet   Commonly known as:  CIPRO   Stopped by:  Joey Campuzano APRN CNP                    Primary Care Provider Office Phone # Fax #    Dickson Reich [phone] [address]  [facility] [address]        Equal Access to Services     [facility]" AH: Hadii virginia florespernell Driver, waaxda luqadaha, qaybta kamehul thomason, melita carmelin hayaajanny lewsadie jacobson colin rodriguez. So [facility] [phone].    ATENCIÓN: Si nilela angelo, tiene a nolan disposición servicios gratuitos de asistencia lingüística. Llame al [phone].    We comply with applicable federal civil rights laws and [place] laws. We do not discriminate on the basis of race, color, national origin, age, disability, sex, sexual orientation, or gender identity.            Thank you!     Thank you for choosing [facility]  for your care. Our goal is always to provide you with excellent care. Hearing back from our patients is one way we can continue to improve our services. Please take a few minutes to complete the written survey that you may receive in the mail after your visit with us. Thank you!             Your Updated Medication List - Protect others around you: Learn how to safely use, store and throw away your medicines at www.disposemymeds.org.          This list is accurate as of 2/22/18  1:05 PM.  Always use your most recent med list.                   Brand Name Dispense Instructions for use Diagnosis    amLODIPine 5 MG tablet    NORVASC    90 tablet    TAKE 1 TABLET BY MOUTH DAILY    Essential hypertension, benign       * B-D U/F 31G X 8 MM   Generic drug:  insulin pen needle           * B-D U/F 31G X 8 MM   Generic drug:  insulin pen needle     500 each    USE 5 PEN NEEDLES PER DAY OR AS DIRECTED    Type 2 diabetes mellitus with stage 3 chronic kidney disease, with long-term current use of insulin (H)       Blood Glucose Monitoring Suppl Misc      3 times daily (before meals)        dexamethasone 4 MG tablet    DECADRON    40 tablet    Take 5 tablets (20 mg) by mouth with food. Days 1, 2, 8, 9, 15, 16, 22, and 23. On carfilzomib days, take prior to carfilzomib dose.    Multiple myeloma not having achieved remission (H)       ferrous sulfate 325 (65 FE) MG tablet     IRON     Take 325 mg by mouth daily (with breakfast)        FLOMAX 0.4 MG capsule   Generic drug:  tamsulosin      Take 0.4 mg by mouth At Bedtime        * FREESTYLE LITE test strip   Generic drug:  blood glucose monitoring     200 strip    USE 1 STRIP TO TEST TWICE DAILY.    Uncontrolled type 1 diabetes mellitus with stage 3 chronic kidney disease (H)       * FREESTYLE LITE test strip   Generic drug:  blood glucose monitoring     400 strip    USE TO TEST FOUR TIMES DAILY    Uncontrolled type 1 diabetes mellitus with stage 3 chronic kidney disease (H)       furosemide 20 MG tablet    LASIX    90 tablet    Take 1 tablet (20 mg) by mouth daily    Multiple myeloma not having achieved remission (H)       gemfibrozil 600 MG tablet    LOPID    180 tablet    TAKE 1 TABLET BY MOUTH TWICE DAILY    Hyperlipidemia       * INSULIN ASPART SC      Inject Subcutaneous every morning Inject 6 unit subcutaneously one time a day for Diabetes II 100unit/mL before breakfast        * INSULIN ASPART SC      Inject Subcutaneous daily (before lunch) Inject 5 unit subcutaneously one time a day for Diabetes II before lunch        * INSULIN ASPART SC      Inject Subcutaneous At Bedtime Inject 4 unit subcutaneously at bedtime for Diabetes II Ok to use Humalog insulin with same order details        * insulin aspart 100 UNIT/ML injection    NovoLOG FLEXPEN    60 mL    Inject 4-17 units under the skin four times daily (with meals and nightly)    Uncontrolled type 2 diabetes mellitus with hyperglycemia, unspecified long term insulin use status (H), Type 2 diabetes mellitus with diabetic chronic kidney disease, unspecified CKD stage, unspecified long term insulin use status (H), Type 2 diabetes mellitus with stage 3 chronic kidney disease, with long-term current use of insulin (H)       insulin detemir 100 UNIT/ML injection    LEVEMIR FLEXPEN/FLEXTOUCH    15 mL    Inject 16 Units Subcutaneous every morning (before breakfast)    Type 2 diabetes  mellitus with diabetic chronic kidney disease, unspecified CKD stage, unspecified long term insulin use status (H), Uncontrolled type 2 diabetes mellitus with hyperglycemia, unspecified long term insulin use status (H), Type 2 diabetes mellitus with stage 3 chronic kidney disease, with long-term current use of insulin (H)       levothyroxine 25 MCG tablet    SYNTHROID/LEVOTHROID    90 tablet    TAKE 1 TABLET BY MOUTH EVERY DAY    Acquired hypothyroidism       LISINOPRIL PO      Take 15 mg by mouth daily        methocarbamol 750 MG tablet    ROBAXIN    30 tablet    Take 1 tablet (750 mg) by mouth 3 times daily as needed for muscle spasms    Acute right-sided low back pain without sciatica       metoprolol tartrate 25 MG tablet    LOPRESSOR    180 tablet    TAKE 1 TABLET BY MOUTH TWICE DAILY    Essential hypertension       omeprazole 20 MG CR capsule    priLOSEC    90 capsule    TAKE 1 CAPSULE BY MOUTH EVERY DAY    Congenital hiatus hernia       polyethylene glycol Packet    MIRALAX/GLYCOLAX    7 packet    Take 17 g by mouth 2 times daily as needed (constipation)    Slow transit constipation       Selenium 200 MCG Tabs      Take 100 mcg by mouth daily. Pt takes one half tab of the 200 mcg daily        VITAMIN C PO      Take 1,000 mg by mouth daily        VITAMIN D (CHOLECALCIFEROL) PO      Take 1,000 Units by mouth        * Notice:  This list has 8 medication(s) that are the same as other medications prescribed for you. Read the directions carefully, and ask your doctor or other care provider to review them with you.

## 2018-02-22 NOTE — TELEPHONE ENCOUNTER
Requested Prescriptions   Pending Prescriptions Disp Refills     NOVOLOG FLEXPEN 100 UNIT/ML soln [Pharmacy Med Name: NOVOLOG FLEXPEN INJ 5X3ML (ORANGE)]  Last Written Prescription Date:  2/8/18  Last Fill Quantity: 60 mL,  # refills: 1   Last office visit: 2/20/2018 with prescribing provider:  Rachana   Future Office Visit:   Next 5 appointments (look out 90 days)     Feb 22, 2018 11:00 AM CST   Return Visit with ALEXANDER Salazar CNP   [facility] ([facility])    [facility]  [address]  [place] [address]   [phone]            Mar 08, 2018 12:30 PM CST   Return Visit with Gretel Quinn MD   [facility] ([facility])    [facility]  [address]  [place] [address]   [phone]                  15 mL 0     Sig: INJECT 1 TO 5 UNITS UNDER THE SKIN FOUR TIMES DAILY WITH MEALS AND NIGHTLY    Short Acting Insulin Protocol Passed    2/22/2018  3:14 AM       Passed - Blood pressure less than 140/90 in past 6 months    BP Readings from Last 3 Encounters:   02/22/18 169/89   02/21/18 143/64   02/20/18 124/60                Passed - LDL on file in past 12 months    Recent Labs   Lab Test  05/23/17   1014   LDL  64            Passed - Microalbumin on file in past 12 months    Recent Labs   Lab Test  01/08/18   0901   02/29/12   1629   [id number]   --    --   9.9   [id number]   --    --   8.8   MICROL  23   < >   --    UMALCR  96.64*   < >   --     < > = values in this interval not displayed.            Passed - Serum creatinine on file in past 12 months    Recent Labs   Lab Test  02/20/18   1312   CR  1.20            Passed - HgbA1C in past 3 or 6 months    Recent Labs   Lab Test  01/08/18   0902   A1C  6.3*            Passed - Patient is age 18 or older       Passed - Recent visit with authorizing provider's specialty in past 6 months    Patient had office visit in the last 6 months or has a visit in the  "next 30 days with authorizing provider.  See \"Patient Info\" tab in inbasket, or \"Choose Columns\" in Meds & Orders section of the refill encounter.              "

## 2018-02-22 NOTE — MR AVS SNAPSHOT
After Visit Summary   2/22/2018    Roc Parkinson    MRN: [id number]           Patient Information     Date Of Birth          7/7/1926        Visit Information        Provider Department      2/22/2018 11:00 AM Joey Campuzano APRN CNP [facility]        Today's Diagnoses     Multiple myeloma not having achieved remission (H)    -  1      Care Instructions      Proceed with treatment today     Pt already has  Future appointments shedulled           Follow-ups after your visit        Your next 10 appointments already scheduled     Mar 07, 2018 11:30 AM CST   Level 2 with  INFUSION CHAIR 14   [facility] ([facility] [place])    [place] [id number]  [place] [address]   [phone]            Mar 08, 2018 11:30 AM CST   Level 2 with SH INFUSION CHAIR 12   [facility] [place] [place]    [facility] [place]  [address]  [place] [address]   [phone]            Mar 08, 2018 12:30 PM CST   Return Visit with Gretel Quinn MD   [facility] ([facility] [place])    [facility] [place]  [address]  [place] [address]   [phone]              Who to contact     If you have questions or need follow up information about today's clinic visit or your schedule please contact [facility] directly at [phone].  Normal or non-critical lab and imaging results will be communicated to you by MyChart, letter or phone within 4 business days after the clinic has received the results. If you do not hear from us within 7 days, please contact the clinic through MyChart or phone. If you have a critical or abnormal lab result, we will notify you by phone as soon as possible.  Submit refill requests through [employer] or call your pharmacy and they will forward the refill request to us. Please allow 3  "business days for your refill to be completed.          Additional Information About Your Visit        MyChart Information     [employer] lets you send messages to your doctor, view your test results, renew your prescriptions, schedule appointments and more. To sign up, go to [email or web address] . Click on \"Log in\" on the left side of the screen, which will take you to the Welcome page. Then click on \"Sign up Now\" on the right side of the page.     You will be asked to enter the access code listed below, as well as some personal information. Please follow the directions to create your username and password.     Your access code is: [email or web address]  Expires: 3/26/2018 11:10 AM     Your access code will  in 90 days. If you need help or a new code, please call your [place] clinic or [phone].        Care EveryWhere ID     This is your Care EveryWhere ID. This could be used by other organizations to access your [place] medical records  [id number]        Your Vitals Were     Pulse Temperature Respirations Height Pulse Oximetry BMI (Body Mass Index)    69 97.6  F (36.4  C) (Oral) 16 1.778 m (5' 10\") 96% 29.7 kg/m2       Blood Pressure from Last 3 Encounters:   18 155/60   18 169/89   18 143/64    Weight from Last 3 Encounters:   18 93.9 kg (206 lb 15.7 oz)   18 94 kg (207 lb 3.2 oz)   18 93 kg (205 lb)              Today, you had the following     No orders found for display         Today's Medication Changes          These changes are accurate as of 18 12:56 PM.  If you have any questions, ask your nurse or doctor.               Stop taking these medicines if you haven't already. Please contact your care team if you have questions.     ciprofloxacin 500 MG tablet   Commonly known as:  CIPRO   Stopped by:  Joey Campuzano APRN CNP                    Primary Care Provider Office Phone # Fax #    Dickson Reich [phone]       [address]  [facility] [address]        Equal Access to Services     [facility] KALIN : Hadii [place] ku jori Somaeali, waaxda luqadaha, qaybta kaalmada paddy, melita christopheryaelcarrillo rodriguez. So [facility] [phone].    ATENCIÓN: Si habla español, tiene a nolan disposición servicios gratuitos de asistencia lingüística. Willyame al [phone].    We comply with applicable federal civil rights laws and [place] laws. We do not discriminate on the basis of race, color, national origin, age, disability, sex, sexual orientation, or gender identity.            Thank you!     Thank you for choosing [facility]  for your care. Our goal is always to provide you with excellent care. Hearing back from our patients is one way we can continue to improve our services. Please take a few minutes to complete the written survey that you may receive in the mail after your visit with us. Thank you!             Your Updated Medication List - Protect others around you: Learn how to safely use, store and throw away your medicines at www.disposemymeds.org.          This list is accurate as of 2/22/18 12:56 PM.  Always use your most recent med list.                   Brand Name Dispense Instructions for use Diagnosis    amLODIPine 5 MG tablet    NORVASC    90 tablet    TAKE 1 TABLET BY MOUTH DAILY    Essential hypertension, benign       * B-D U/F 31G X 8 MM   Generic drug:  insulin pen needle           * B-D U/F 31G X 8 MM   Generic drug:  insulin pen needle     500 each    USE 5 PEN NEEDLES PER DAY OR AS DIRECTED    Type 2 diabetes mellitus with stage 3 chronic kidney disease, with long-term current use of insulin (H)       Blood Glucose Monitoring Suppl Misc      3 times daily (before meals)        dexamethasone 4 MG tablet    DECADRON    40 tablet    Take 5 tablets (20 mg) by mouth with food. Days 1, 2, 8, 9, 15, 16, 22, and 23. On carfilzomib days, take prior to carfilzomib dose.    Multiple myeloma not having achieved remission  (H)       ferrous sulfate 325 (65 FE) MG tablet    IRON     Take 325 mg by mouth daily (with breakfast)        FLOMAX 0.4 MG capsule   Generic drug:  tamsulosin      Take 0.4 mg by mouth At Bedtime        * FREESTYLE LITE test strip   Generic drug:  blood glucose monitoring     200 strip    USE 1 STRIP TO TEST TWICE DAILY.    Uncontrolled type 1 diabetes mellitus with stage 3 chronic kidney disease (H)       * FREESTYLE LITE test strip   Generic drug:  blood glucose monitoring     400 strip    USE TO TEST FOUR TIMES DAILY    Uncontrolled type 1 diabetes mellitus with stage 3 chronic kidney disease (H)       furosemide 20 MG tablet    LASIX    90 tablet    Take 1 tablet (20 mg) by mouth daily    Multiple myeloma not having achieved remission (H)       gemfibrozil 600 MG tablet    LOPID    180 tablet    TAKE 1 TABLET BY MOUTH TWICE DAILY    Hyperlipidemia       * INSULIN ASPART SC      Inject Subcutaneous every morning Inject 6 unit subcutaneously one time a day for Diabetes II 100unit/mL before breakfast        * INSULIN ASPART SC      Inject Subcutaneous daily (before lunch) Inject 5 unit subcutaneously one time a day for Diabetes II before lunch        * INSULIN ASPART SC      Inject Subcutaneous At Bedtime Inject 4 unit subcutaneously at bedtime for Diabetes II Ok to use Humalog insulin with same order details        * insulin aspart 100 UNIT/ML injection    NovoLOG FLEXPEN    60 mL    Inject 4-17 units under the skin four times daily (with meals and nightly)    Uncontrolled type 2 diabetes mellitus with hyperglycemia, unspecified long term insulin use status (H), Type 2 diabetes mellitus with diabetic chronic kidney disease, unspecified CKD stage, unspecified long term insulin use status (H), Type 2 diabetes mellitus with stage 3 chronic kidney disease, with long-term current use of insulin (H)       insulin detemir 100 UNIT/ML injection    LEVEMIR FLEXPEN/FLEXTOUCH    15 mL    Inject 16 Units Subcutaneous every  morning (before breakfast)    Type 2 diabetes mellitus with diabetic chronic kidney disease, unspecified CKD stage, unspecified long term insulin use status (H), Uncontrolled type 2 diabetes mellitus with hyperglycemia, unspecified long term insulin use status (H), Type 2 diabetes mellitus with stage 3 chronic kidney disease, with long-term current use of insulin (H)       levothyroxine 25 MCG tablet    SYNTHROID/LEVOTHROID    90 tablet    TAKE 1 TABLET BY MOUTH EVERY DAY    Acquired hypothyroidism       LISINOPRIL PO      Take 15 mg by mouth daily        methocarbamol 750 MG tablet    ROBAXIN    30 tablet    Take 1 tablet (750 mg) by mouth 3 times daily as needed for muscle spasms    Acute right-sided low back pain without sciatica       metoprolol tartrate 25 MG tablet    LOPRESSOR    180 tablet    TAKE 1 TABLET BY MOUTH TWICE DAILY    Essential hypertension       omeprazole 20 MG CR capsule    priLOSEC    90 capsule    TAKE 1 CAPSULE BY MOUTH EVERY DAY    Congenital hiatus hernia       polyethylene glycol Packet    MIRALAX/GLYCOLAX    7 packet    Take 17 g by mouth 2 times daily as needed (constipation)    Slow transit constipation       Selenium 200 MCG Tabs      Take 100 mcg by mouth daily. Pt takes one half tab of the 200 mcg daily        VITAMIN C PO      Take 1,000 mg by mouth daily        VITAMIN D (CHOLECALCIFEROL) PO      Take 1,000 Units by mouth        * Notice:  This list has 8 medication(s) that are the same as other medications prescribed for you. Read the directions carefully, and ask your doctor or other care provider to review them with you.

## 2018-02-22 NOTE — PROGRESS NOTES
Oncology/Hematology Visit Note  Feb 22, 2018    Reason for Visit: follow up of multiple myeloma     History of Present Illness: Roc Parkinson is a 89 year old male with multiple myeloma. He is currently undergoing treatment with Carfilzomib and dexamethasone started on 01/02/2018  And Zometa     - he has MDS - he is taking Aranesp 300 mcg SQ every three weeks  -hx of prostate cancer no recurrences since 1986   He comes in today for routine follow up prior to cycle     Interval History:  He has been tolerating treatment well.  He denies edema chest pain, shortness of breath.  He denies bone pain his energy is improving. He denies fever chills or night sweats.  He denies cough.  He denies nausea vomiting or diarrhea denies abdominal pain.  He is eating 3 meals a days and his weight has been stable.  He denies neuropathy    Review of Systems:  14 point ROS of systems including Constitutional, Eyes, Respiratory, Cardiovascular, Gastroenterology, Genitourinary, Integumentary, Muscularskeletal, Psychiatric were all negative except for pertinent positives noted in my HPI.      Current Outpatient Prescriptions   Medication Sig Dispense Refill     methocarbamol (ROBAXIN) 750 MG tablet Take 1 tablet (750 mg) by mouth 3 times daily as needed for muscle spasms 30 tablet 0     FREESTYLE LITE test strip USE TO TEST FOUR TIMES DAILY 400 strip 0     insulin aspart (NOVOLOG FLEXPEN) 100 UNIT/ML injection Inject 4-17 units under the skin four times daily (with meals and nightly) 60 mL 1     levothyroxine (SYNTHROID/LEVOTHROID) 25 MCG tablet TAKE 1 TABLET BY MOUTH EVERY DAY 90 tablet 0     INSULIN ASPART SC Inject Subcutaneous every morning Inject 6 unit subcutaneously one time a day for Diabetes II 100unit/mL before breakfast       INSULIN ASPART SC Inject Subcutaneous daily (before lunch) Inject 5 unit subcutaneously one time a day for Diabetes II before lunch       INSULIN ASPART SC Inject Subcutaneous At Bedtime Inject 4  unit subcutaneously at bedtime for Diabetes II Ok to use Humalog insulin with same order details       dexamethasone (DECADRON) 4 MG tablet Take 5 tablets (20 mg) by mouth with food. Days 1, 2, 8, 9, 15, 16, 22, and 23. On carfilzomib days, take prior to carfilzomib dose. 40 tablet 0     VITAMIN D, CHOLECALCIFEROL, PO Take 1,000 Units by mouth       metoprolol tartrate (LOPRESSOR) 25 MG tablet TAKE 1 TABLET BY MOUTH TWICE DAILY 180 tablet 0     tamsulosin (FLOMAX) 0.4 MG capsule Take 0.4 mg by mouth At Bedtime       Blood Glucose Monitoring Suppl MISC 3 times daily (before meals)        polyethylene glycol (MIRALAX/GLYCOLAX) Packet Take 17 g by mouth 2 times daily as needed (constipation) 7 packet      insulin detemir (LEVEMIR FLEXPEN/FLEXTOUCH) 100 UNIT/ML injection Inject 16 Units Subcutaneous every morning (before breakfast) 15 mL 1     LISINOPRIL PO Take 15 mg by mouth daily       amLODIPine (NORVASC) 5 MG tablet TAKE 1 TABLET BY MOUTH DAILY 90 tablet 3     furosemide (LASIX) 20 MG tablet Take 1 tablet (20 mg) by mouth daily 90 tablet 1     gemfibrozil (LOPID) 600 MG tablet TAKE 1 TABLET BY MOUTH TWICE DAILY 180 tablet 1     B-D U/F 31G X 8 MM insulin pen needle USE 5 PEN NEEDLES PER DAY OR AS DIRECTED 500 each 1     FREESTYLE LITE test strip USE 1 STRIP TO TEST TWICE DAILY. 200 strip 1     omeprazole (PRILOSEC) 20 MG CR capsule TAKE 1 CAPSULE BY MOUTH EVERY DAY 90 capsule 3     B-D U/F 31G X 8 MM insulin pen needle        Ascorbic Acid (VITAMIN C PO) Take 1,000 mg by mouth daily       ferrous sulfate (IRON) 325 (65 FE) MG tablet Take 325 mg by mouth daily (with breakfast)       Selenium 200 MCG TABS Take 100 mcg by mouth daily. Pt takes one half tab of the 200 mcg daily        [DISCONTINUED] cyclophosphamide (CYTOXAN) 50 MG capsule CHEMO Take 6 capsules (300 mg) by mouth once a week 24 capsule 0       Physical Examination:  General: The patient is a pleasant male in no acute distress.  /89 (BP Location:  "Right arm, Patient Position: Chair, Cuff Size: Adult Regular)  Pulse 69  Temp 97.6  F (36.4  C) (Oral)  Resp 16  Ht 1.778 m (5' 10\")  Wt 93.9 kg (206 lb 15.7 oz)  SpO2 96%  BMI 29.7 kg/m2     BP recheck 155/60    Wt Readings from Last 10 Encounters:   02/22/18 93.9 kg (206 lb 15.7 oz)   02/22/18 94 kg (207 lb 3.2 oz)   02/21/18 93 kg (205 lb)   02/20/18 93 kg (205 lb)   02/17/18 92.8 kg (204 lb 9.6 oz)   02/14/18 91.9 kg (202 lb 9.6 oz)   02/08/18 93.4 kg (206 lb)   02/07/18 93.1 kg (205 lb 3.2 oz)   02/07/18 90.6 kg (199 lb 12.8 oz)   02/01/18 91.6 kg (202 lb)     HEENT: EOMI, PERRL. Sclerae are anicteric. Oral mucosa is pink and moist with no lesions or thrush.   Lymph: Neck is supple with no lymphadenopathy in the cervical or supraclavicular areas.   Heart: Regular rate and rhythm.   Lungs: Clear to auscultation bilaterally.   GI: Bowel sounds present, soft, nontender with no palpable hepatosplenomegaly or masses.   Extremities: No lower extremity edema noted bilaterally.   Skin: No rashes, petechiae, or bruising noted on exposed skin.    Laboratory Data:  Results for JACOB MEDELLIN (MRN [id number]) as of 2/22/2018 12:39   Ref. Range 2/21/2018 11:05   WBC Latest Ref Range: 4.0 - 11.0 10e9/L 4.9   Hemoglobin Latest Ref Range: 13.3 - 17.7 g/dL 8.7 (L)   Hematocrit Latest Ref Range: 40.0 - 53.0 % 26.2 (L)   Platelet Count Latest Ref Range: 150 - 450 10e9/L 109 (L)   RBC Count Latest Ref Range: 4.4 - 5.9 10e12/L 2.37 (L)   MCV Latest Ref Range: 78 - 100 fl 111 (H)   MCH Latest Ref Range: 26.5 - 33.0 pg 36.7 (H)   MCHC Latest Ref Range: 31.5 - 36.5 g/dL 33.2   RDW Latest Ref Range: 10.0 - 15.0 % 17.6 (H)   Diff Method Unknown Automated Method   % Neutrophils Latest Units: % 90.9   % Lymphocytes Latest Units: % 5.9   % Monocytes Latest Units: % 2.2   % Eosinophils Latest Units: % 0.4   % Basophils Latest Units: % 0.0   % Immature Granulocytes Latest Units: % 0.6   Nucleated RBCs Latest Ref Range: 0 /100 0 "   Absolute Neutrophil Latest Ref Range: 1.6 - 8.3 10e9/L 4.5   Absolute Lymphocytes Latest Ref Range: 0.8 - 5.3 10e9/L 0.3 (L)   Absolute Monocytes Latest Ref Range: 0.0 - 1.3 10e9/L 0.1   Absolute Eosinophils Latest Ref Range: 0.0 - 0.7 10e9/L 0.0   Absolute Basophils Latest Ref Range: 0.0 - 0.2 10e9/L 0.0   Abs Immature Granulocytes Latest Ref Range: 0 - 0.4 10e9/L 0.0   Absolute Nucleated RBC Unknown 0.0       Assessment and Plan:    This is a 89 y/o male with     Multiple myeloma   - kappa light chain   S/p   Velcade/dex and later  cyclophosphamide added to treatment  Developed neuropathy therefore Velcade  Discontinued. He  continued with weekly cyclophosphamide and dexamethasone. Disease progressed and he was  swished on Carfilzomib and dexamethasone on 01/02/2018   He has been tolerating treatment well.  We will proceed with cycle 2 day 16 today.    -Continue Zometa every 12 weeks-last given last week  He will return to clinic on 03/07 for cycle 3.  He will see Dr. Quinn  on the day      MDS   anemia stable   No need for transfusions  He will continue with Aranesp 300 mcg SQ every three weeks the next one to be given on March 14      hx of prostate cancer no recurrences since 1986      Hypertension - stable for him   He is on amlodipine, lisinopril and metoprolol         ALEXANDER Salazar CNP  Hem/Onc   [facility]

## 2018-02-22 NOTE — LETTER
"    2/22/2018         RE: Roc Parkinson  [address]   [facility] [address]        Dear Colleague,    Thank you for referring your patient, Roc Parkinson, to the [facility]. Please see a copy of my visit note below.    Oncology Rooming Note    February 22, 2018 10:56 AM   Roc Parkinson is a 89 year old male who presents for:    Chief Complaint   Patient presents with     Oncology Clinic Visit     RETURN-Multiple myeloma not having achieved remission      Initial Vitals: /89 (BP Location: Right arm, Patient Position: Chair, Cuff Size: Adult Regular)  Pulse 69  Temp 97.6  F (36.4  C) (Oral)  Resp 16  Ht 1.778 m (5' 10\")  Wt 93.9 kg (206 lb 15.7 oz)  SpO2 96%  BMI 29.7 kg/m2 Estimated body mass index is 29.7 kg/(m^2) as calculated from the following:    Height as of this encounter: 1.778 m (5' 10\").    Weight as of this encounter: 93.9 kg (206 lb 15.7 oz). Body surface area is 2.15 meters squared.  No Pain (0) Comment: Data Unavailable   No LMP for male patient.  Allergies reviewed: Yes  Medications reviewed: Yes    Medications: Medication refills not needed today.  Pharmacy name entered into [email or web address]:    [facility] [place], [place] - [address] [facility] STORE [address] [facility] [address] [facility]    Clinical concerns: none     5 minutes for nursing intake (face to face time)     Regla Strong CMA                Oncology/Hematology Visit Note  Feb 22, 2018    Reason for Visit: follow up of multiple myeloma     History of Present Illness: Roc Parkinson is a 89 year old male with multiple myeloma. He is currently undergoing treatment with Carfilzomib and dexamethasone started on 01/02/2018  And Zometa     - he has MDS - he is taking Aranesp 300 mcg SQ every three weeks  -hx of prostate cancer no recurrences since 1986   He comes in today for routine follow up prior to cycle     Interval History:  He " has been tolerating treatment well.  He denies edema chest pain, shortness of breath.  He denies bone pain his energy is improving. He denies fever chills or night sweats.  He denies cough.  He denies nausea vomiting or diarrhea denies abdominal pain.  He is eating 3 meals a days and his weight has been stable.  He denies neuropathy    Review of Systems:  14 point ROS of systems including Constitutional, Eyes, Respiratory, Cardiovascular, Gastroenterology, Genitourinary, Integumentary, Muscularskeletal, Psychiatric were all negative except for pertinent positives noted in my HPI.      Current Outpatient Prescriptions   Medication Sig Dispense Refill     methocarbamol (ROBAXIN) 750 MG tablet Take 1 tablet (750 mg) by mouth 3 times daily as needed for muscle spasms 30 tablet 0     FREESTYLE LITE test strip USE TO TEST FOUR TIMES DAILY 400 strip 0     insulin aspart (NOVOLOG FLEXPEN) 100 UNIT/ML injection Inject 4-17 units under the skin four times daily (with meals and nightly) 60 mL 1     levothyroxine (SYNTHROID/LEVOTHROID) 25 MCG tablet TAKE 1 TABLET BY MOUTH EVERY DAY 90 tablet 0     INSULIN ASPART SC Inject Subcutaneous every morning Inject 6 unit subcutaneously one time a day for Diabetes II 100unit/mL before breakfast       INSULIN ASPART SC Inject Subcutaneous daily (before lunch) Inject 5 unit subcutaneously one time a day for Diabetes II before lunch       INSULIN ASPART SC Inject Subcutaneous At Bedtime Inject 4 unit subcutaneously at bedtime for Diabetes II Ok to use Humalog insulin with same order details       dexamethasone (DECADRON) 4 MG tablet Take 5 tablets (20 mg) by mouth with food. Days 1, 2, 8, 9, 15, 16, 22, and 23. On carfilzomib days, take prior to carfilzomib dose. 40 tablet 0     VITAMIN D, CHOLECALCIFEROL, PO Take 1,000 Units by mouth       metoprolol tartrate (LOPRESSOR) 25 MG tablet TAKE 1 TABLET BY MOUTH TWICE DAILY 180 tablet 0     tamsulosin (FLOMAX) 0.4 MG capsule Take 0.4 mg by mouth  "At Bedtime       Blood Glucose Monitoring Suppl MISC 3 times daily (before meals)        polyethylene glycol (MIRALAX/GLYCOLAX) Packet Take 17 g by mouth 2 times daily as needed (constipation) 7 packet      insulin detemir (LEVEMIR FLEXPEN/FLEXTOUCH) 100 UNIT/ML injection Inject 16 Units Subcutaneous every morning (before breakfast) 15 mL 1     LISINOPRIL PO Take 15 mg by mouth daily       amLODIPine (NORVASC) 5 MG tablet TAKE 1 TABLET BY MOUTH DAILY 90 tablet 3     furosemide (LASIX) 20 MG tablet Take 1 tablet (20 mg) by mouth daily 90 tablet 1     gemfibrozil (LOPID) 600 MG tablet TAKE 1 TABLET BY MOUTH TWICE DAILY 180 tablet 1     B-D U/F 31G X 8 MM insulin pen needle USE 5 PEN NEEDLES PER DAY OR AS DIRECTED 500 each 1     FREESTYLE LITE test strip USE 1 STRIP TO TEST TWICE DAILY. 200 strip 1     omeprazole (PRILOSEC) 20 MG CR capsule TAKE 1 CAPSULE BY MOUTH EVERY DAY 90 capsule 3     B-D U/F 31G X 8 MM insulin pen needle        Ascorbic Acid (VITAMIN C PO) Take 1,000 mg by mouth daily       ferrous sulfate (IRON) 325 (65 FE) MG tablet Take 325 mg by mouth daily (with breakfast)       Selenium 200 MCG TABS Take 100 mcg by mouth daily. Pt takes one half tab of the 200 mcg daily        [DISCONTINUED] cyclophosphamide (CYTOXAN) 50 MG capsule CHEMO Take 6 capsules (300 mg) by mouth once a week 24 capsule 0       Physical Examination:  General: The patient is a pleasant male in no acute distress.  /89 (BP Location: Right arm, Patient Position: Chair, Cuff Size: Adult Regular)  Pulse 69  Temp 97.6  F (36.4  C) (Oral)  Resp 16  Ht 1.778 m (5' 10\")  Wt 93.9 kg (206 lb 15.7 oz)  SpO2 96%  BMI 29.7 kg/m2     BP recheck 155/60    Wt Readings from Last 10 Encounters:   02/22/18 93.9 kg (206 lb 15.7 oz)   02/22/18 94 kg (207 lb 3.2 oz)   02/21/18 93 kg (205 lb)   02/20/18 93 kg (205 lb)   02/17/18 92.8 kg (204 lb 9.6 oz)   02/14/18 91.9 kg (202 lb 9.6 oz)   02/08/18 93.4 kg (206 lb)   02/07/18 93.1 kg (205 lb 3.2 " oz)   02/07/18 90.6 kg (199 lb 12.8 oz)   02/01/18 91.6 kg (202 lb)     HEENT: EOMI, PERRL. Sclerae are anicteric. Oral mucosa is pink and moist with no lesions or thrush.   Lymph: Neck is supple with no lymphadenopathy in the cervical or supraclavicular areas.   Heart: Regular rate and rhythm.   Lungs: Clear to auscultation bilaterally.   GI: Bowel sounds present, soft, nontender with no palpable hepatosplenomegaly or masses.   Extremities: No lower extremity edema noted bilaterally.   Skin: No rashes, petechiae, or bruising noted on exposed skin.    Laboratory Data:  Results for JACOB MEDELLIN (MRN [id number]) as of 2/22/2018 12:39   Ref. Range 2/21/2018 11:05   WBC Latest Ref Range: 4.0 - 11.0 10e9/L 4.9   Hemoglobin Latest Ref Range: 13.3 - 17.7 g/dL 8.7 (L)   Hematocrit Latest Ref Range: 40.0 - 53.0 % 26.2 (L)   Platelet Count Latest Ref Range: 150 - 450 10e9/L 109 (L)   RBC Count Latest Ref Range: 4.4 - 5.9 10e12/L 2.37 (L)   MCV Latest Ref Range: 78 - 100 fl 111 (H)   MCH Latest Ref Range: 26.5 - 33.0 pg 36.7 (H)   MCHC Latest Ref Range: 31.5 - 36.5 g/dL 33.2   RDW Latest Ref Range: 10.0 - 15.0 % 17.6 (H)   Diff Method Unknown Automated Method   % Neutrophils Latest Units: % 90.9   % Lymphocytes Latest Units: % 5.9   % Monocytes Latest Units: % 2.2   % Eosinophils Latest Units: % 0.4   % Basophils Latest Units: % 0.0   % Immature Granulocytes Latest Units: % 0.6   Nucleated RBCs Latest Ref Range: 0 /100 0   Absolute Neutrophil Latest Ref Range: 1.6 - 8.3 10e9/L 4.5   Absolute Lymphocytes Latest Ref Range: 0.8 - 5.3 10e9/L 0.3 (L)   Absolute Monocytes Latest Ref Range: 0.0 - 1.3 10e9/L 0.1   Absolute Eosinophils Latest Ref Range: 0.0 - 0.7 10e9/L 0.0   Absolute Basophils Latest Ref Range: 0.0 - 0.2 10e9/L 0.0   Abs Immature Granulocytes Latest Ref Range: 0 - 0.4 10e9/L 0.0   Absolute Nucleated RBC Unknown 0.0       Assessment and Plan:    This is a 89 y/o male with     Multiple myeloma   - kappa light  chain   S/p   Velcade/dex and later  cyclophosphamide added to treatment  Developed neuropathy therefore Velcade  Discontinued. He  continued with weekly cyclophosphamide and dexamethasone. Disease progressed and he was  swished on Carfilzomib and dexamethasone on 01/02/2018   He has been tolerating treatment well.  We will proceed with cycle 2 day 16 today.    -Continue Zometa every 12 weeks-last given last week  He will return to clinic on 03/07 for cycle 3.  He will see Dr. Quinn  on the day      MDS   anemia stable   No need for transfusions  He will continue with Aranesp 300 mcg SQ every three weeks the next one to be given on March 14      hx of prostate cancer no recurrences since 1986      Hypertension - stable for him   He is on amlodipine, lisinopril and metoprolol         ALEXANDER Salazar CNP  Hem/Onc   [facility]               Again, thank you for allowing me to participate in the care of your patient.        Sincerely,        ALEXANDER Salazar CNP

## 2018-03-07 NOTE — MR AVS SNAPSHOT
After Visit Summary   3/7/2018    Roc Parkinson    MRN: [id number]           Patient Information     Date Of Birth          7/7/1926        Visit Information        Provider Department      3/7/2018 11:30 AM SH INFUSION CHAIR 14 [facility]        Today's Diagnoses     Multiple myeloma not having achieved remission (H)    -  1       Follow-ups after your visit        Your next 10 appointments already scheduled     Mar 08, 2018 11:30 AM CST   Level 2 with SH INFUSION CHAIR 12   [facility] ([facility])    [facility]  [address]  [place] [address]   [phone]            Mar 08, 2018 12:30 PM CST   Return Visit with Gretel Quinn MD   [facility] ([facility])    [facility]  [address]  [place] [address]   [phone]            Mar 14, 2018 12:30 PM CDT   Level 2 with SH INFUSION CHAIR 17   [facility] ([facility])    [facility]  [address]  [place] [address]   [phone]            Mar 15, 2018 11:30 AM CDT   Level 2 with SH INFUSION CHAIR 11   [facility] ([facility])    [facility]  [address]  [place] [address]   [phone]            Mar 21, 2018 11:00 AM CDT   Level 2 with SH INFUSION CHAIR 19   [facility] ([facility])    [facility]  [address]  [place] [address]   [phone]            Mar 22, 2018 11:00 AM CDT   Level 2 with SH INFUSION CHAIR 2   [facility] ([facility])    [facility]  [address]  [place] [address]   [phone]              Who to contact     If you  "have questions or need follow up information about today's clinic visit or your schedule please contact [facility] directly at [phone].  Normal or non-critical lab and imaging results will be communicated to you by [employer], letter or phone within 4 business days after the clinic has received the results. If you do not hear from us within 7 days, please contact the clinic through [employer] or phone. If you have a critical or abnormal lab result, we will notify you by phone as soon as possible.  Submit refill requests through Reebee or call your pharmacy and they will forward the refill request to us. Please allow 3 business days for your refill to be completed.          Additional Information About Your Visit        [employer] Information     Reebee lets you send messages to your doctor, view your test results, renew your prescriptions, schedule appointments and more. To sign up, go to [email or web address] . Click on \"Log in\" on the left side of the screen, which will take you to the Welcome page. Then click on \"Sign up Now\" on the right side of the page.     You will be asked to enter the access code listed below, as well as some personal information. Please follow the directions to create your username and password.     Your access code is: [email or web address]  Expires: 3/26/2018 11:10 AM     Your access code will  in 90 days. If you need help or a new code, please call your [facility] clinic or [phone].        Care EveryWhere ID     This is your Care EveryWhere ID. This could be used by other organizations to access your [facility] medical records  [id number]        Your Vitals Were     Pulse Temperature Respirations             78 98  F (36.7  C) (Oral) 16          Blood Pressure from Last 3 Encounters:   18 147/72   18 155/60   18 169/89    Weight from Last 3 Encounters:   18 93.9 kg (206 lb 15.7 oz)   18 94 kg (207 lb 3.2 oz)   18 93 kg " (205 lb)              We Performed the Following     CBC with platelets differential     Comprehensive metabolic panel     Kappa and lambda light chain     Protein electrophoresis     Protein Immunofixation Serum          Today's Medication Changes          These changes are accurate as of 3/7/18  1:10 PM.  If you have any questions, ask your nurse or doctor.               These medicines have changed or have updated prescriptions.        Dose/Directions    * dexamethasone 4 MG tablet   Commonly known as:  DECADRON   This may have changed:  Another medication with the same name was added. Make sure you understand how and when to take each.   Used for:  Multiple myeloma not having achieved remission (H)        Take 5 tablets (20 mg) by mouth with food. Days 1, 2, 8, 9, 15, 16, 22, and 23. On carfilzomib days, take prior to carfilzomib dose.   Quantity:  40 tablet   Refills:  0       * dexamethasone 4 MG tablet   Commonly known as:  DECADRON   This may have changed:  You were already taking a medication with the same name, and this prescription was added. Make sure you understand how and when to take each.   Used for:  Multiple myeloma not having achieved remission (H)        Take 5 tablets (20 mg) by mouth with food. Days 1, 2, 8, 9, 15, 16, 22, and 23. On carfilzomib days, take prior to carfilzomib dose.   Quantity:  40 tablet   Refills:  0       * Notice:  This list has 2 medication(s) that are the same as other medications prescribed for you. Read the directions carefully, and ask your doctor or other care provider to review them with you.         Where to get your medicines      These medications were sent to [facility] [id number] [place] [address] [address] AT [facility] [address], [facility] [address]    Hours:  24-hours Phone:  [phone]     dexamethasone 4 MG tablet                Primary Care Provider Office Phone # Fax #    Dickson Reich [phone]  [phone]       [address]  [facility] [address]        Equal Access to Services     SHANITASANDRA KALIN : Hadii aad ku hademelyo Somaeali, waaxda luqadaha, qaybta kaalmada paddy, melita carmelin hayaajanny lewsadie jacobson laLynnmarge rodriguez. So [facility] [phone].    ATENCIÓN: Si habla español, tiene a nolan disposición servicios gratuitos de asistencia lingüística. Llame al [phone].    We comply with applicable federal civil rights laws and [place] laws. We do not discriminate on the basis of race, color, national origin, age, disability, sex, sexual orientation, or gender identity.            Thank you!     Thank you for choosing [facility]  for your care. Our goal is always to provide you with excellent care. Hearing back from our patients is one way we can continue to improve our services. Please take a few minutes to complete the written survey that you may receive in the mail after your visit with us. Thank you!             Your Updated Medication List - Protect others around you: Learn how to safely use, store and throw away your medicines at www.disposemymeds.org.          This list is accurate as of 3/7/18  1:10 PM.  Always use your most recent med list.                   Brand Name Dispense Instructions for use Diagnosis    amLODIPine 5 MG tablet    NORVASC    90 tablet    TAKE 1 TABLET BY MOUTH DAILY    Essential hypertension, benign       * B-D U/F 31G X 8 MM   Generic drug:  insulin pen needle           * B-D U/F 31G X 8 MM   Generic drug:  insulin pen needle     500 each    USE 5 PEN NEEDLES PER DAY OR AS DIRECTED    Type 2 diabetes mellitus with stage 3 chronic kidney disease, with long-term current use of insulin (H)       Blood Glucose Monitoring Suppl Misc      3 times daily (before meals)        * dexamethasone 4 MG tablet    DECADRON    40 tablet    Take 5 tablets (20 mg) by mouth with food. Days 1, 2, 8, 9, 15, 16, 22, and 23. On carfilzomib days, take prior to carfilzomib  dose.    Multiple myeloma not having achieved remission (H)       * dexamethasone 4 MG tablet    DECADRON    40 tablet    Take 5 tablets (20 mg) by mouth with food. Days 1, 2, 8, 9, 15, 16, 22, and 23. On carfilzomib days, take prior to carfilzomib dose.    Multiple myeloma not having achieved remission (H)       ferrous sulfate 325 (65 FE) MG tablet    IRON     Take 325 mg by mouth daily (with breakfast)        FLOMAX 0.4 MG capsule   Generic drug:  tamsulosin      Take 0.4 mg by mouth At Bedtime        * FREESTYLE LITE test strip   Generic drug:  blood glucose monitoring     200 strip    USE 1 STRIP TO TEST TWICE DAILY.    Uncontrolled type 1 diabetes mellitus with stage 3 chronic kidney disease (H)       * FREESTYLE LITE test strip   Generic drug:  blood glucose monitoring     400 strip    USE TO TEST FOUR TIMES DAILY    Uncontrolled type 1 diabetes mellitus with stage 3 chronic kidney disease (H)       furosemide 20 MG tablet    LASIX    90 tablet    Take 1 tablet (20 mg) by mouth daily    Multiple myeloma not having achieved remission (H)       gemfibrozil 600 MG tablet    LOPID    180 tablet    TAKE 1 TABLET BY MOUTH TWICE DAILY    Hyperlipidemia       * INSULIN ASPART SC      Inject Subcutaneous every morning Inject 6 unit subcutaneously one time a day for Diabetes II 100unit/mL before breakfast        * INSULIN ASPART SC      Inject Subcutaneous daily (before lunch) Inject 5 unit subcutaneously one time a day for Diabetes II before lunch        * INSULIN ASPART SC      Inject Subcutaneous At Bedtime Inject 4 unit subcutaneously at bedtime for Diabetes II Ok to use Humalog insulin with same order details        * insulin aspart 100 UNIT/ML injection    NovoLOG FLEXPEN    60 mL    Inject 4-17 units under the skin four times daily (with meals and nightly)    Uncontrolled type 2 diabetes mellitus with hyperglycemia, unspecified long term insulin use status (H), Type 2 diabetes mellitus with diabetic chronic  kidney disease, unspecified CKD stage, unspecified long term insulin use status (H), Type 2 diabetes mellitus with stage 3 chronic kidney disease, with long-term current use of insulin (H)       * NovoLOG FLEXPEN 100 UNIT/ML injection   Generic drug:  insulin aspart     15 mL    INJECT 1 TO 5 UNITS UNDER THE SKIN FOUR TIMES DAILY WITH MEALS AND NIGHTLY    Uncontrolled type 2 diabetes mellitus with hyperglycemia, unspecified long term insulin use status (H)       insulin detemir 100 UNIT/ML injection    LEVEMIR FLEXPEN/FLEXTOUCH    15 mL    Inject 16 Units Subcutaneous every morning (before breakfast)    Type 2 diabetes mellitus with diabetic chronic kidney disease, unspecified CKD stage, unspecified long term insulin use status (H), Uncontrolled type 2 diabetes mellitus with hyperglycemia, unspecified long term insulin use status (H), Type 2 diabetes mellitus with stage 3 chronic kidney disease, with long-term current use of insulin (H)       levothyroxine 25 MCG tablet    SYNTHROID/LEVOTHROID    90 tablet    TAKE 1 TABLET BY MOUTH EVERY DAY    Acquired hypothyroidism       LISINOPRIL PO      Take 15 mg by mouth daily        methocarbamol 750 MG tablet    ROBAXIN    30 tablet    Take 1 tablet (750 mg) by mouth 3 times daily as needed for muscle spasms    Acute right-sided low back pain without sciatica       metoprolol tartrate 25 MG tablet    LOPRESSOR    180 tablet    TAKE 1 TABLET BY MOUTH TWICE DAILY    Essential hypertension       omeprazole 20 MG CR capsule    priLOSEC    90 capsule    TAKE 1 CAPSULE BY MOUTH EVERY DAY    Congenital hiatus hernia       polyethylene glycol Packet    MIRALAX/GLYCOLAX    7 packet    Take 17 g by mouth 2 times daily as needed (constipation)    Slow transit constipation       Selenium 200 MCG Tabs      Take 100 mcg by mouth daily. Pt takes one half tab of the 200 mcg daily        VITAMIN C PO      Take 1,000 mg by mouth daily        VITAMIN D (CHOLECALCIFEROL) PO      Take 1,000  Units by mouth        * Notice:  This list has 11 medication(s) that are the same as other medications prescribed for you. Read the directions carefully, and ask your doctor or other care provider to review them with you.

## 2018-03-07 NOTE — TELEPHONE ENCOUNTER
"Requested Prescriptions   Pending Prescriptions Disp Refills     levothyroxine (SYNTHROID/LEVOTHROID) 25 MCG tablet [Pharmacy Med Name: LEVOTHYROXINE 0.025MG (25MCG) TAB] 90 tablet 0    Last Written Prescription Date:  02/07/2018  Last Fill Quantity: 90,  # refills: 0   Last office visit: 2/20/2018 with prescribing provider:  02/20/2018   Future Office Visit:   Next 5 appointments (look out 90 days)     Mar 08, 2018 12:30 PM CST   Return Visit with Gretel Quinn MD   [facility]    [facility]  [address] [facility] [address]  [facility] [address]   [phone]                Sig: TAKE 1 TABLET BY MOUTH EVERY DAY    Thyroid Protocol Failed    3/7/2018  9:18 AM       Failed - Normal TSH on file in past 12 months    Recent Labs   Lab Test  09/28/16   1439   TSH  5.53*             Passed - Patient is 12 years or older       Passed - Recent (12 mo) or future (30 days) visit within the authorizing provider's specialty    Patient had office visit in the last year or has a visit in the next 30 days with authorizing provider.  See \"Patient Info\" tab in inbasket, or \"Choose Columns\" in Meds & Orders section of the refill encounter.             "

## 2018-03-07 NOTE — PROGRESS NOTES
Infusion Nursing Note:  Roc Parkinson presents today for Kyprolis C3D1.    Patient seen by provider today: No   present during visit today: Not Applicable.    Note: N/A.    Intravenous Access:  Lab draw site right hand, Needle type Butterfly, Gauge 23.  Labs drawn without difficulty.  Peripheral IV placed.    Treatment Conditions:  Lab Results   Component Value Date    HGB 8.7 03/07/2018     Lab Results   Component Value Date    WBC 4.2 03/07/2018      Lab Results   Component Value Date    ANEU 3.7 03/07/2018     Lab Results   Component Value Date     03/07/2018      Results reviewed, labs MET treatment parameters, ok to proceed with treatment.    Post Infusion Assessment:  Patient tolerated infusion without incident.  Site patent and intact, free from redness, edema or discomfort.  No evidence of extravasations.  Access discontinued per protocol.    Discharge Plan:   Patient instructed that dexamethasone was sent ot [facility] in [place]  Discharge instructions reviewed with: Patient.  Patient and/or family verbalized understanding of discharge instructions and all questions answered.  Copy of AVS reviewed with patient and/or family.  Patient will return 3/8/2018 for next appointment.  Patient discharged in stable condition accompanied by: self.  Departure Mode: Ambulatory.    Patsy Singh RN

## 2018-03-07 NOTE — TELEPHONE ENCOUNTER
Routing refill request to provider for review/approval because:  Labs out of range:  TSH  Labs not current:  TSH

## 2018-03-08 NOTE — LETTER
"    3/8/2018         RE: Roc Parkinson  [address]   [facility] [place] [facility] [address]        Dear Colleague,    Thank you for referring your patient, Roc Parkinson, to the [facility]. Please see a copy of my visit note below.    Oncology Rooming Note    March 8, 2018 12:16 PM   Roc Parkinson is a 89 year old male who presents for:    Chief Complaint   Patient presents with     Oncology Clinic Visit     MDS (myelodysplastic syndrome) (H)     Initial Vitals: /78 (BP Location: Right arm, Patient Position: Sitting, Cuff Size: Adult Large)  Pulse 78  Temp 98.4  F (36.9  C) (Oral)  Resp 16  Ht 1.778 m (5' 10\")  Wt 93.3 kg (205 lb 12.8 oz)  SpO2 94%  BMI 29.53 kg/m2 Estimated body mass index is 29.53 kg/(m^2) as calculated from the following:    Height as of this encounter: 1.778 m (5' 10\").    Weight as of this encounter: 93.3 kg (205 lb 12.8 oz). Body surface area is 2.15 meters squared.  No Pain (0) Comment: Data Unavailable   No LMP for male patient.  Allergies reviewed: Yes  Medications reviewed: Yes    Medications: Medication refills not needed today.  Pharmacy name entered into 2nd Watch:    [facility] [place], [place] - [address] [facility] [id number] [place] [address] [address] [facility] [place] [facility]    Clinical concerns: no    5 minutes for nursing intake (face to face time)        Winsome Diaz MA                [facility]  HEMATOLOGY ONCOLOGY     DIAGNOSIS:    1- Kappa light chain IgM multiple myeloma in a 89-year-old patient.  Bone marrow biopsy on 11/17/2016 showed hypercellular bone marrow with 65% cellularity of light chain restricted plasma cells.  FISH or G-banding could not be performed due to insufficient sample.   There is a background of myelodysplastic syndrome.  The patient was initially seen in the hospital on 11/17/2016 for macrocytic anemia and pancytopenia and transfusion requirement and a " "bone marrow biopsy was performed.   2- History of prostate cancer s/p EBRT s/p brachytherapy in 2003 (recent PSA 0.10), superficial bladder cancer ,no recurrences since 1986     TREATMENT: 12/15/16 velcade/dex. 4/12/17 added cyclophosphamide 300 mg weekly.6/28/17 we discontinued velcade due to concern for neuropathy and continued with weekly cyclophosphamide and dexamethasone.   1/2/18 switched to Carfilzomib and dexamethasone.      SUBJECTIVE:  The patient was seen as a followup today. He is tolerating treatment well. No significant side effects.     REVIEW OF SYSTEMS:  A complete review of systems was performed and found to be negative other than pertinent positives mentioned in history of present illness.     Past medical, social histories reviewed.    Meds- Reviewed.     PHYSICAL EXAMINATION:   VITAL SIGNS:/78 (BP Location: Right arm, Patient Position: Sitting, Cuff Size: Adult Large)  Pulse 78  Temp 98.4  F (36.9  C) (Oral)  Resp 16  Ht 1.778 m (5' 10\")  Wt 93.3 kg (205 lb 12.8 oz)  SpO2 94%  BMI 29.53 kg/m2  CONSTITUTIONAL: Sitting comfortably.   HEENT: Pupils are equal. Oropharynx is clear.   NECK: No cervical or supraclavicular lymphadenopathy.   RESPIRATORY: Clear bilaterally.   CARD/VASC: S1, S2, regular.   GI: Soft, nontender, nondistended, no hepatosplenomegaly.   MUSKULOSKELETAL: Warm, well perfused.   NEUROLOGIC: Alert, awake.   INTEGUMENT: No rash.   LYMPHATICS: No edema.   PSYCH: Mood and affect was normal.     LABORATORY DATA AND IMAGING REVIEWED DURING THIS VISIT:  Recent Labs   Lab Test  03/07/18   1130  02/20/18   1312  02/07/18   1100   01/22/18   0837  01/21/18   0834  01/20/18   0220   NA  137  138  140   < >  141  140  139   POTASSIUM  4.3  4.3  4.1   < >  4.1  4.1  4.1   CHLORIDE  109  109  108   < >  109  108  107   CO2  20  21  22   < >  23  22  22   ANIONGAP  8  8  10   < >  9  10  10   BUN  21  33*  24   < >  25  29  45*   CR  0.93  1.20  1.14   < >  1.01  1.02  1.45*   GLC  " 104*   --   241*   < >  110*  183*  75   MICHELLE  8.8   --   8.6   < >  8.9  8.8  9.1   MAG   --    --    --    --   2.3   --   2.2   PHOS   --    --    --    --    --   2.9  2.2*    < > = values in this interval not displayed.     Recent Labs   Lab Test  03/07/18   1130  02/21/18   1105  02/20/18   1312   WBC  4.2  4.9  5.3   HGB  8.7*  8.7*  9.1*   PLT  195  109*  88*   MCV  111*  111*  119*   NEUTROPHIL  88.1  90.9  79.3     Recent Labs   Lab Test  03/07/18   1130  02/07/18   1100  01/31/18   1030   11/17/16   0938   BILITOTAL  0.5  0.4  0.4   < >   --    ALKPHOS  99  99  100   < >   --    ALT  14  14  14   < >   --    AST  15  14  11   < >   --    ALBUMIN  3.1*  2.7*  2.7*   < >   --    LDH   --    --    --    --   110    < > = values in this interval not displayed.     ECOG PS: 1    ASSESSMENT:  This is a 89-year-old gentleman who has kappa light chain multiple myeloma with hypercellular marrow with 65% of cells are light chain restricted plasma cells.  He had severe pancytopenia and has needed a transfusion in the hospital.  He did not have hypercalcemia and his renal function was normal. He has a background of myelodysplastic syndrome on his bone marrow biopsy; however, majority of the cell population was monoclonal plasma cell population.   He was started on treatment due to cytopenia.   In 4/2017 his light chain levels were getting worse. M spike was stable. We added cyclophosphamide to the regimen. In 6/2017 discontinued velcade for concern of development of neuropathy, in hindsight the pain appeared to be related to arthritis.     He is on Aranesp 300 mcg SQ every three weeks.  He is on Carfilzomib and dexamethasone.     - Labs were reviewed with the patient, normocytic anemia. No transfusion needs.   Myeloma labs are in progress. Lately I have not see an improving trend. If no response then we will considering switching to a different treatment.     PLAN:   1.  Continue Carfilzomib/Dexamethasone. Chemo  today.   2.  Zometa every 12 weeks  3. Continue Aranesp 300 mcg SQ every three weeks  4- RTCMD 4 weeks  5- Continue Acyclovir    GRETEL QUINN MD    3/8/2018        Again, thank you for allowing me to participate in the care of your patient.        Sincerely,        Gretel Quinn MD

## 2018-03-08 NOTE — PROGRESS NOTES
Infusion Nursing Note:  Roc SILVA Tesha presents today for kyprolis.    Patient seen by provider today: Yes: Dr. Quinn   present during visit today: Not Applicable.    Note: N/A.    Intravenous Access:  Peripheral IV placed.    Treatment Conditions:  Pt seen by provider today.      Post Infusion Assessment:  Patient tolerated infusion without incident.  Blood return noted pre and post infusion.  Site patent and intact, free from redness, edema or discomfort.  No evidence of extravasations.  Access discontinued per protocol.    Discharge Plan:   Discharge instructions reviewed with: Patient.  Patient and/or family verbalized understanding of discharge instructions and all questions answered.  Copy of AVS reviewed with patient and/or family.  Patient will return 3/14/18 for next appointment.  Patient discharged in stable condition accompanied by: self.  Departure Mode: Ambulatory.    Doug Rios RN

## 2018-03-08 NOTE — MR AVS SNAPSHOT
After Visit Summary   3/8/2018    Roc Parkinson    MRN: [id number]           Patient Information     Date Of Birth          7/7/1926        Visit Information        Provider Department      3/8/2018 11:30 AM SH INFUSION CHAIR 12 [facility]        Today's Diagnoses     Multiple myeloma not having achieved remission (H)    -  1       Follow-ups after your visit        Your next 10 appointments already scheduled     Mar 14, 2018 12:30 PM CDT   Level 2 with SH INFUSION CHAIR 17   [facility] ([facility])    [facility]  [address]  [place] [address]   [phone]            Mar 15, 2018 11:30 AM CDT   Level 2 with SH INFUSION CHAIR 11   [facility] ([facility])    [facility]  [address]  [place] [address]   [phone]            Mar 21, 2018 11:00 AM CDT   Level 2 with SH INFUSION CHAIR 19   [facility] ([facility])    [facility]  [address]  [place] [address]   [phone]            Mar 22, 2018 11:00 AM CDT   Level 2 with SH INFUSION CHAIR 2   [facility] ([facility])    [facility]  [address]  [place] [address]   [phone]            Apr 04, 2018 12:00 PM CDT   Level 2 with  INFUSION CHAIR 13   [facility] ([facility])    [facility]  [address]  [place] [address]   [phone]            Apr 04, 2018  1:00 PM CDT   Return Visit with Gretel Quinn MD   [facility] ([facility])    [facility]  [address]  [place] [address]   [phone]            Apr 05, 2018 11:30 AM CDT  "  Level 2 with  INFUSION CHAIR 11   [facility] ([facility])    [facility] [place]  [address]  [place] [address] [phone]              Who to contact     If you have questions or need follow up information about today's clinic visit or your schedule please contact [facility] directly at [phone].  Normal or non-critical lab and imaging results will be communicated to you by [facility], letter or phone within 4 business days after the clinic has received the results. If you do not hear from us within 7 days, please contact the clinic through [facility] or phone. If you have a critical or abnormal lab result, we will notify you by phone as soon as possible.  Submit refill requests through [employer] or call your pharmacy and they will forward the refill request to us. Please allow 3 business days for your refill to be completed.          Additional Information About Your Visit        [employer] Information     [employer] lets you send messages to your doctor, view your test results, renew your prescriptions, schedule appointments and more. To sign up, go to [email or web address] . Click on \"Log in\" on the left side of the screen, which will take you to the Welcome page. Then click on \"Sign up Now\" on the right side of the page.     You will be asked to enter the access code listed below, as well as some personal information. Please follow the directions to create your username and password.     Your access code is: [email or web address]  Expires: 3/26/2018 11:10 AM     Your access code will  in 90 days. If you need help or a new code, please call your [facility] clinic or [phone].        Care EveryWhere ID     This is your Care EveryWhere ID. This could be used by other organizations to access your [facility] medical records  [id number]        Your Vitals Were     Pulse Height BMI (Body Mass Index)          " "   78 1.778 m (5' 10\") 29.53 kg/m2          Blood Pressure from Last 3 Encounters:   03/08/18 164/78   03/08/18 145/78   03/07/18 146/65    Weight from Last 3 Encounters:   03/08/18 93.3 kg (205 lb 12.8 oz)   03/08/18 93.4 kg (205 lb 12.8 oz)   02/22/18 93.9 kg (206 lb 15.7 oz)              Today, you had the following     No orders found for display         Today's Medication Changes          These changes are accurate as of 3/8/18  4:33 PM.  If you have any questions, ask your nurse or doctor.               Stop taking these medicines if you haven't already. Please contact your care team if you have questions.     methocarbamol 750 MG tablet   Commonly known as:  ROBAXIN   Stopped by:  Gretel Quinn MD                    Primary Care Provider Office Phone # Fax #    Dickson Clark Reich [phone] [address] [facility] [address]        Equal Access to Services     [facility]: Hadii virginia wilson hadasho Soaimee, waaxda luqadaha, qaybta kaalmada ademalinda, melita shaw . So [facility] [phone].    ATENCIÓN: Si habla español, tiene a nolan disposición servicios gratuitos de asistencia lingüística. Eric al [phone].    We comply with applicable federal civil rights laws and [place] laws. We do not discriminate on the basis of race, color, national origin, age, disability, sex, sexual orientation, or gender identity.            Thank you!     Thank you for choosing [facility]  for your care. Our goal is always to provide you with excellent care. Hearing back from our patients is one way we can continue to improve our services. Please take a few minutes to complete the written survey that you may receive in the mail after your visit with us. Thank you!             Your Updated Medication List - Protect others around you: Learn how to safely use, store and throw away your medicines at www.disposemymeds.org.          This list is " accurate as of 3/8/18  4:33 PM.  Always use your most recent med list.                   Brand Name Dispense Instructions for use Diagnosis    amLODIPine 5 MG tablet    NORVASC    90 tablet    TAKE 1 TABLET BY MOUTH DAILY    Essential hypertension, benign       * B-D U/F 31G X 8 MM   Generic drug:  insulin pen needle           * B-D U/F 31G X 8 MM   Generic drug:  insulin pen needle     500 each    USE 5 PEN NEEDLES PER DAY OR AS DIRECTED    Type 2 diabetes mellitus with stage 3 chronic kidney disease, with long-term current use of insulin (H)       Blood Glucose Monitoring Suppl Misc      3 times daily (before meals)        * dexamethasone 4 MG tablet    DECADRON    40 tablet    Take 5 tablets (20 mg) by mouth with food. Days 1, 2, 8, 9, 15, 16, 22, and 23. On carfilzomib days, take prior to carfilzomib dose.    Multiple myeloma not having achieved remission (H)       * dexamethasone 4 MG tablet    DECADRON    40 tablet    Take 5 tablets (20 mg) by mouth with food. Days 1, 2, 8, 9, 15, 16, 22, and 23. On carfilzomib days, take prior to carfilzomib dose.    Multiple myeloma not having achieved remission (H)       ferrous sulfate 325 (65 FE) MG tablet    IRON     Take 325 mg by mouth daily (with breakfast)        FLOMAX 0.4 MG capsule   Generic drug:  tamsulosin      Take 0.4 mg by mouth At Bedtime        * FREESTYLE LITE test strip   Generic drug:  blood glucose monitoring     200 strip    USE 1 STRIP TO TEST TWICE DAILY.    Uncontrolled type 1 diabetes mellitus with stage 3 chronic kidney disease (H)       * FREESTYLE LITE test strip   Generic drug:  blood glucose monitoring     400 strip    USE TO TEST FOUR TIMES DAILY    Uncontrolled type 1 diabetes mellitus with stage 3 chronic kidney disease (H)       furosemide 20 MG tablet    LASIX    90 tablet    Take 1 tablet (20 mg) by mouth daily    Multiple myeloma not having achieved remission (H)       gemfibrozil 600 MG tablet    LOPID    180 tablet    TAKE 1 TABLET  BY MOUTH TWICE DAILY    Hyperlipidemia       * INSULIN ASPART SC      Inject Subcutaneous every morning Inject 6 unit subcutaneously one time a day for Diabetes II 100unit/mL before breakfast        * INSULIN ASPART SC      Inject Subcutaneous daily (before lunch) Inject 5 unit subcutaneously one time a day for Diabetes II before lunch        * INSULIN ASPART SC      Inject Subcutaneous At Bedtime Inject 4 unit subcutaneously at bedtime for Diabetes II Ok to use Humalog insulin with same order details        * insulin aspart 100 UNIT/ML injection    NovoLOG FLEXPEN    60 mL    Inject 4-17 units under the skin four times daily (with meals and nightly)    Uncontrolled type 2 diabetes mellitus with hyperglycemia, unspecified long term insulin use status (H), Type 2 diabetes mellitus with diabetic chronic kidney disease, unspecified CKD stage, unspecified long term insulin use status (H), Type 2 diabetes mellitus with stage 3 chronic kidney disease, with long-term current use of insulin (H)       * NovoLOG FLEXPEN 100 UNIT/ML injection   Generic drug:  insulin aspart     15 mL    INJECT 1 TO 5 UNITS UNDER THE SKIN FOUR TIMES DAILY WITH MEALS AND NIGHTLY    Uncontrolled type 2 diabetes mellitus with hyperglycemia, unspecified long term insulin use status (H)       insulin detemir 100 UNIT/ML injection    LEVEMIR FLEXPEN/FLEXTOUCH    15 mL    Inject 16 Units Subcutaneous every morning (before breakfast)    Type 2 diabetes mellitus with diabetic chronic kidney disease, unspecified CKD stage, unspecified long term insulin use status (H), Uncontrolled type 2 diabetes mellitus with hyperglycemia, unspecified long term insulin use status (H), Type 2 diabetes mellitus with stage 3 chronic kidney disease, with long-term current use of insulin (H)       levothyroxine 25 MCG tablet    SYNTHROID/LEVOTHROID    90 tablet    TAKE 1 TABLET BY MOUTH EVERY DAY    Acquired hypothyroidism       LISINOPRIL PO      Take 15 mg by mouth daily         metoprolol tartrate 25 MG tablet    LOPRESSOR    180 tablet    TAKE 1 TABLET BY MOUTH TWICE DAILY    Essential hypertension       omeprazole 20 MG CR capsule    priLOSEC    90 capsule    TAKE 1 CAPSULE BY MOUTH EVERY DAY    Congenital hiatus hernia       polyethylene glycol Packet    MIRALAX/GLYCOLAX    7 packet    Take 17 g by mouth 2 times daily as needed (constipation)    Slow transit constipation       Selenium 200 MCG Tabs      Take 100 mcg by mouth daily. Pt takes one half tab of the 200 mcg daily        VITAMIN C PO      Take 1,000 mg by mouth daily        VITAMIN D (CHOLECALCIFEROL) PO      Take 1,000 Units by mouth        * Notice:  This list has 11 medication(s) that are the same as other medications prescribed for you. Read the directions carefully, and ask your doctor or other care provider to review them with you.

## 2018-03-08 NOTE — MR AVS SNAPSHOT
After Visit Summary   3/8/2018    Roc Parkinson    MRN: [id number]           Patient Information     Date Of Birth          7/7/1926        Visit Information        Provider Department      3/8/2018 12:30 PM Gretel Quinn MD [facility]        Today's Diagnoses     Multiple myeloma not having achieved remission (H)    -  1      Care Instructions    1.  Continue Carfilzomib/Dexamethasone. Chemo today.  Scheduled/thomas  2.  Zometa every 12 weeks  Scheduled/thomas   3. Continue Aranesp 300 mcg SQ every three weeks scheduled/thomas   4- RTCMD 4 weeks scheduled/thomas   5- Continue Acyclovir    AVS printed and mailed to patient/thomas          Follow-ups after your visit        Your next 10 appointments already scheduled     Mar 22, 2018  8:00 AM CDT   Level 6 with SH INFUSION CHAIR 3   [facility] and [facility]fusion Center ([facility])    [facility]  [address]  [place] [address]   [phone]            Mar 23, 2018  8:00 AM CDT   Level 6 with SH INFUSION CHAIR 5   [facility] ([facility])    [facility]  [address]  [place] [address] [phone]            Mar 29, 2018  8:00 AM CDT   Level 6 with SH INFUSION CHAIR 5   [facility] ([facility])    [facility]  [address]  [place] [address] [phone]            Mar 29, 2018 10:00 AM CDT   Return Visit with ALEXANDER Salazar CNP   [facility] ([facility])    [facility]  [address]  [place] [address]   [phone]            Apr 05, 2018  7:30 AM CDT   Level 6 with SH INFUSION CHAIR 1   [facility] and [facility] ([facility])    [facility]  [address]  [place] [address]    [phone]            Apr 11, 2018  8:00 AM CDT   Level 6 with SH INFUSION CHAIR 6   [facility] ([facility])    [facility] [place]  [address]  [place] [address]   [phone]            Apr 18, 2018  8:00 AM CDT   Level 6 with SH INFUSION CHAIR 4   [facility] and Infusion Center ([facility])    [facility] [place] [address]  [place] [address]   [phone]            Apr 25, 2018  8:00 AM CDT   Level 6 with SH INFUSION CHAIR 6   [facility] and [facility]fusion Center ([facility])    [facility] [place]  [address]  [place] [address]   [phone]            Apr 25, 2018  8:45 AM CDT   Return Visit with Gretel Quinn MD   [facility] ([facility])    [facility] [place] [address]  [place] [address]   [phone]            May 09, 2018  8:00 AM CDT   Level 6 with SH INFUSION CHAIR 5   [facility] ([facility])    [facility] [place] [address]  [place] [address]   [phone]              Who to contact     If you have questions or need follow up information about today's clinic visit or your schedule please contact [facility] directly at [phone].  Normal or non-critical lab and imaging results will be communicated to you by MyChart, letter or phone within 4 business days after the clinic has received the results. If you do not hear from us within 7 days, please contact the clinic through [employer] or phone. If you have a critical or abnormal lab result, we will notify you by phone as soon as possible.  Submit refill requests through [email or web address] or call your pharmacy and they will forward the refill request to us. Please allow 3 business days for your refill to be completed.     "      Additional Information About Your Visit        MyChart Information     [employer] lets you send messages to your doctor, view your test results, renew your prescriptions, schedule appointments and more. To sign up, go to [email or web address] . Click on \"Log in\" on the left side of the screen, which will take you to the Welcome page. Then click on \"Sign up Now\" on the right side of the page.     You will be asked to enter the access code listed below, as well as some personal information. Please follow the directions to create your username and password.     Your access code is: [email or web address]  Expires: 3/26/2018 12:10 PM     Your access code will  in 90 days. If you need help or a new code, please call your [facility] clinic or [phone].        Care EveryWhere ID     This is your Care EveryWhere ID. This could be used by other organizations to access your [facility] medical records  [id number]        Your Vitals Were     Pulse Temperature Respirations Height Pulse Oximetry BMI (Body Mass Index)    78 98.4  F (36.9  C) (Oral) 16 1.778 m (5' 10\") 94% 29.53 kg/m2       Blood Pressure from Last 3 Encounters:   18 110/60   18 110/62   18 164/78    Weight from Last 3 Encounters:   18 93.2 kg (205 lb 6.4 oz)   18 93.2 kg (205 lb 6.4 oz)   18 93.3 kg (205 lb 12.8 oz)              Today, you had the following     No orders found for display         Today's Medication Changes          These changes are accurate as of 3/8/18 11:59 PM.  If you have any questions, ask your nurse or doctor.               Stop taking these medicines if you haven't already. Please contact your care team if you have questions.     methocarbamol 750 MG tablet   Commonly known as:  ROBAXIN   Stopped by:  Gretel Quinn MD                    Primary Care Provider Office Phone # Fax #    Dickson Reich [phone] [address] [facility] [address]        Equal Access to " Services     [facility]: Hadii virginia wilson gabyvincenzo Ginaali, waaxda luqadaha, qaybta kaalmada paddy, melita shaw . So [facility] [phone].    ATENCIÓN: Si renaldo stephens, tiene a nolan disposición servicios gratuitos de asistencia lingüística. Llame al [phone].    We comply with applicable federal civil rights laws and [place] laws. We do not discriminate on the basis of race, color, national origin, age, disability, sex, sexual orientation, or gender identity.            Thank you!     Thank you for choosing [facility]  for your care. Our goal is always to provide you with excellent care. Hearing back from our patients is one way we can continue to improve our services. Please take a few minutes to complete the written survey that you may receive in the mail after your visit with us. Thank you!             Your Updated Medication List - Protect others around you: Learn how to safely use, store and throw away your medicines at www.disposemymeds.org.          This list is accurate as of 3/8/18 11:59 PM.  Always use your most recent med list.                   Brand Name Dispense Instructions for use Diagnosis    amLODIPine 5 MG tablet    NORVASC    90 tablet    TAKE 1 TABLET BY MOUTH DAILY    Essential hypertension, benign       * B-D U/F 31G X 8 MM   Generic drug:  insulin pen needle           * B-D U/F 31G X 8 MM   Generic drug:  insulin pen needle     500 each    USE 5 PEN NEEDLES PER DAY OR AS DIRECTED    Type 2 diabetes mellitus with stage 3 chronic kidney disease, with long-term current use of insulin (H)       Blood Glucose Monitoring Suppl Misc      3 times daily (before meals)        * dexamethasone 4 MG tablet    DECADRON    40 tablet    Take 5 tablets (20 mg) by mouth with food. Days 1, 2, 8, 9, 15, 16, 22, and 23. On carfilzomib days, take prior to carfilzomib dose.    Multiple myeloma not having achieved remission (H)       * dexamethasone 4 MG tablet     DECADRON    40 tablet    Take 5 tablets (20 mg) by mouth with food. Days 1, 2, 8, 9, 15, 16, 22, and 23. On carfilzomib days, take prior to carfilzomib dose.    Multiple myeloma not having achieved remission (H)       ferrous sulfate 325 (65 FE) MG tablet    IRON     Take 325 mg by mouth daily (with breakfast)        FLOMAX 0.4 MG capsule   Generic drug:  tamsulosin      Take 0.4 mg by mouth At Bedtime        * FREESTYLE LITE test strip   Generic drug:  blood glucose monitoring     200 strip    USE 1 STRIP TO TEST TWICE DAILY.    Uncontrolled type 1 diabetes mellitus with stage 3 chronic kidney disease (H)       * FREESTYLE LITE test strip   Generic drug:  blood glucose monitoring     400 strip    USE TO TEST FOUR TIMES DAILY    Uncontrolled type 1 diabetes mellitus with stage 3 chronic kidney disease (H)       furosemide 20 MG tablet    LASIX    90 tablet    Take 1 tablet (20 mg) by mouth daily    Multiple myeloma not having achieved remission (H)       gemfibrozil 600 MG tablet    LOPID    180 tablet    TAKE 1 TABLET BY MOUTH TWICE DAILY    Hyperlipidemia       * INSULIN ASPART SC      Inject Subcutaneous every morning Inject 6 unit subcutaneously one time a day for Diabetes II 100unit/mL before breakfast        * INSULIN ASPART SC      Inject Subcutaneous daily (before lunch) Inject 5 unit subcutaneously one time a day for Diabetes II before lunch        * INSULIN ASPART SC      Inject Subcutaneous At Bedtime Inject 4 unit subcutaneously at bedtime for Diabetes II Ok to use Humalog insulin with same order details        * insulin aspart 100 UNIT/ML injection    NovoLOG FLEXPEN    60 mL    Inject 4-17 units under the skin four times daily (with meals and nightly)    Uncontrolled type 2 diabetes mellitus with hyperglycemia, unspecified long term insulin use status (H), Type 2 diabetes mellitus with diabetic chronic kidney disease, unspecified CKD stage, unspecified long term insulin use status (H), Type 2 diabetes  mellitus with stage 3 chronic kidney disease, with long-term current use of insulin (H)       * NovoLOG FLEXPEN 100 UNIT/ML injection   Generic drug:  insulin aspart     15 mL    INJECT 1 TO 5 UNITS UNDER THE SKIN FOUR TIMES DAILY WITH MEALS AND NIGHTLY    Uncontrolled type 2 diabetes mellitus with hyperglycemia, unspecified long term insulin use status (H)       insulin detemir 100 UNIT/ML injection    LEVEMIR FLEXPEN/FLEXTOUCH    15 mL    Inject 16 Units Subcutaneous every morning (before breakfast)    Type 2 diabetes mellitus with diabetic chronic kidney disease, unspecified CKD stage, unspecified long term insulin use status (H), Uncontrolled type 2 diabetes mellitus with hyperglycemia, unspecified long term insulin use status (H), Type 2 diabetes mellitus with stage 3 chronic kidney disease, with long-term current use of insulin (H)       levothyroxine 25 MCG tablet    SYNTHROID/LEVOTHROID    90 tablet    TAKE 1 TABLET BY MOUTH EVERY DAY    Acquired hypothyroidism       LISINOPRIL PO      Take 15 mg by mouth daily        metoprolol tartrate 25 MG tablet    LOPRESSOR    180 tablet    TAKE 1 TABLET BY MOUTH TWICE DAILY    Essential hypertension       polyethylene glycol Packet    MIRALAX/GLYCOLAX    7 packet    Take 17 g by mouth 2 times daily as needed (constipation)    Slow transit constipation       Selenium 200 MCG Tabs      Take 100 mcg by mouth daily. Pt takes one half tab of the 200 mcg daily        VITAMIN C PO      Take 1,000 mg by mouth daily        VITAMIN D (CHOLECALCIFEROL) PO      Take 1,000 Units by mouth        * Notice:  This list has 11 medication(s) that are the same as other medications prescribed for you. Read the directions carefully, and ask your doctor or other care provider to review them with you.

## 2018-03-08 NOTE — PATIENT INSTRUCTIONS
1.  Continue Carfilzomib/Dexamethasone. Chemo today.  Scheduled/thomas  2.  Zometa every 12 weeks  Scheduled/thomas   3. Continue Aranesp 300 mcg SQ every three weeks scheduled/thomas   4- RTCMD 4 weeks scheduled/thomas   5- Continue Acyclovir    AVS printed and mailed to patient/thomas

## 2018-03-08 NOTE — PROGRESS NOTES
"Oncology Rooming Note    March 8, 2018 12:16 PM   Roc Parkinson is a 89 year old male who presents for:    Chief Complaint   Patient presents with     Oncology Clinic Visit     MDS (myelodysplastic syndrome) (H)     Initial Vitals: /78 (BP Location: Right arm, Patient Position: Sitting, Cuff Size: Adult Large)  Pulse 78  Temp 98.4  F (36.9  C) (Oral)  Resp 16  Ht 1.778 m (5' 10\")  Wt 93.3 kg (205 lb 12.8 oz)  SpO2 94%  BMI 29.53 kg/m2 Estimated body mass index is 29.53 kg/(m^2) as calculated from the following:    Height as of this encounter: 1.778 m (5' 10\").    Weight as of this encounter: 93.3 kg (205 lb 12.8 oz). Body surface area is 2.15 meters squared.  No Pain (0) Comment: Data Unavailable   No LMP for male patient.  Allergies reviewed: Yes  Medications reviewed: Yes    Medications: Medication refills not needed today.  Pharmacy name entered into [facility]:    [facility] [place], [place] - [address] [facility] [id number] [place] [address] - [address] AT [address]    Clinical concerns: no    5 minutes for nursing intake (face to face time)        Winsome Diaz MA              "

## 2018-03-08 NOTE — PROGRESS NOTES
"[facility]  HEMATOLOGY ONCOLOGY     DIAGNOSIS:    1- Kappa light chain IgM multiple myeloma in a 89-year-old patient.  Bone marrow biopsy on 11/17/2016 showed hypercellular bone marrow with 65% cellularity of light chain restricted plasma cells.  FISH or G-banding could not be performed due to insufficient sample.   There is a background of myelodysplastic syndrome.  The patient was initially seen in the hospital on 11/17/2016 for macrocytic anemia and pancytopenia and transfusion requirement and a bone marrow biopsy was performed.   2- History of prostate cancer s/p EBRT s/p brachytherapy in 2003 (recent PSA 0.10), superficial bladder cancer ,no recurrences since 1986     TREATMENT: 12/15/16 velcade/dex. 4/12/17 added cyclophosphamide 300 mg weekly.6/28/17 we discontinued velcade due to concern for neuropathy and continued with weekly cyclophosphamide and dexamethasone.   1/2/18 switched to Carfilzomib and dexamethasone.      SUBJECTIVE:  The patient was seen as a followup today. He is tolerating treatment well. No significant side effects.     REVIEW OF SYSTEMS:  A complete review of systems was performed and found to be negative other than pertinent positives mentioned in history of present illness.     Past medical, social histories reviewed.    Meds- Reviewed.     PHYSICAL EXAMINATION:   VITAL SIGNS:/78 (BP Location: Right arm, Patient Position: Sitting, Cuff Size: Adult Large)  Pulse 78  Temp 98.4  F (36.9  C) (Oral)  Resp 16  Ht 1.778 m (5' 10\")  Wt 93.3 kg (205 lb 12.8 oz)  SpO2 94%  BMI 29.53 kg/m2  CONSTITUTIONAL: Sitting comfortably.   HEENT: Pupils are equal. Oropharynx is clear.   NECK: No cervical or supraclavicular lymphadenopathy.   RESPIRATORY: Clear bilaterally.   CARD/VASC: S1, S2, regular.   GI: Soft, nontender, nondistended, no hepatosplenomegaly.   MUSKULOSKELETAL: Warm, well perfused.   NEUROLOGIC: Alert, awake.   INTEGUMENT: No rash.   LYMPHATICS: No " edema.   PSYCH: Mood and affect was normal.     LABORATORY DATA AND IMAGING REVIEWED DURING THIS VISIT:  Recent Labs   Lab Test  03/07/18   1130  02/20/18   1312  02/07/18   1100   01/22/18   0837  01/21/18   0834  01/20/18   0220   NA  137  138  140   < >  141  140  139   POTASSIUM  4.3  4.3  4.1   < >  4.1  4.1  4.1   CHLORIDE  109  109  108   < >  109  108  107   CO2  20  21  22   < >  23  22  22   ANIONGAP  8  8  10   < >  9  10  10   BUN  21  33*  24   < >  25  29  45*   CR  0.93  1.20  1.14   < >  1.01  1.02  1.45*   GLC  104*   --   241*   < >  110*  183*  75   MICHELLE  8.8   --   8.6   < >  8.9  8.8  9.1   MAG   --    --    --    --   2.3   --   2.2   PHOS   --    --    --    --    --   2.9  2.2*    < > = values in this interval not displayed.     Recent Labs   Lab Test  03/07/18   1130  02/21/18   1105  02/20/18   1312   WBC  4.2  4.9  5.3   HGB  8.7*  8.7*  9.1*   PLT  195  109*  88*   MCV  111*  111*  119*   NEUTROPHIL  88.1  90.9  79.3     Recent Labs   Lab Test  03/07/18   1130  02/07/18   1100  01/31/18   1030   11/17/16   0938   BILITOTAL  0.5  0.4  0.4   < >   --    ALKPHOS  99  99  100   < >   --    ALT  14  14  14   < >   --    AST  15  14  11   < >   --    ALBUMIN  3.1*  2.7*  2.7*   < >   --    LDH   --    --    --    --   110    < > = values in this interval not displayed.     ECOG PS: 1    ASSESSMENT:  This is a 89-year-old gentleman who has kappa light chain multiple myeloma with hypercellular marrow with 65% of cells are light chain restricted plasma cells.  He had severe pancytopenia and has needed a transfusion in the hospital.  He did not have hypercalcemia and his renal function was normal. He has a background of myelodysplastic syndrome on his bone marrow biopsy; however, majority of the cell population was monoclonal plasma cell population.   He was started on treatment due to cytopenia.   In 4/2017 his light chain levels were getting worse. M spike was stable. We added cyclophosphamide to  the regimen. In 6/2017 discontinued velcade for concern of development of neuropathy, in hindsight the pain appeared to be related to arthritis.     He is on Aranesp 300 mcg SQ every three weeks.  He is on Carfilzomib and dexamethasone.     - Labs were reviewed with the patient, normocytic anemia. No transfusion needs.   Myeloma labs are in progress. Lately I have not see an improving trend. If no response then we will considering switching to a different treatment.     PLAN:   1.  Continue Carfilzomib/Dexamethasone. Chemo today.   2.  Zometa every 12 weeks  3. Continue Aranesp 300 mcg SQ every three weeks  4- RTCMD 4 weeks  5- Continue Acyclovir    ALISON JON MD    3/8/2018

## 2018-03-14 NOTE — PATIENT INSTRUCTIONS
1.  Start Dartumumab/dexamethasone- chemo education today.( DOne-CW) He will start with weekly treatment. First dose will be split in two days   Scheduled - Susy  2. Zometa every 12 weeks   Scheduled Susy  3. Continue Aranesp 300 mcg SQ every three weeks   Scheduled - Susy( TODAY)  4- See NP in 2 weeks   Scheduled - Susy  5- Continue Acyclovir  6- RTC MD 6 weeks   Scheduled - Susy      AVS given to patient - Susy

## 2018-03-14 NOTE — MR AVS SNAPSHOT
After Visit Summary   3/14/2018    Roc Parkinson    MRN: [id number]           Patient Information     Date Of Birth          7/7/1926        Visit Information        Provider Department      3/14/2018 12:30 PM SH INFUSION CHAIR 17 [facility]        Today's Diagnoses     Multiple myeloma not having achieved remission (H)    -  1    MDS (myelodysplastic syndrome) (H)           Follow-ups after your visit        Your next 10 appointments already scheduled     Mar 22, 2018  8:00 AM CDT   Level 6 with SH INFUSION CHAIR 3   [facility] ([facility])    [facility]  [address]  [place] [address]   [phone]            Mar 23, 2018  8:00 AM CDT   Level 6 with SH INFUSION CHAIR 5   [facility] ([facility])    [facility]  [address]  [place] [address]   [phone]            Mar 29, 2018  8:00 AM CDT   Level 6 with SH INFUSION CHAIR 5   [facility] ([facility])    [facility]  [address]  [place] [address] [phone]            Mar 29, 2018 10:00 AM CDT   Return Visit with ALEXANDER Salazar CNP   [facility] ([facility])    [facility]  [address]  [place] [address]   [phone]            Apr 05, 2018  7:30 AM CDT   Level 6 with SH INFUSION CHAIR 1   [facility] ([facility])    [facility]  [address]  [place] [address]   [phone]            Apr 11, 2018  8:00 AM CDT   Level 6 with SH INFUSION CHAIR 6   [facility] ([facility])    [facility]  [address]  [place] [address]    [phone]            Apr 18, 2018  8:00 AM CDT   Level 6 with SH INFUSION CHAIR 4   [facility] ([facility] [place])    [facility]  [address]  [place] [address]   [phone]            Apr 25, 2018  8:00 AM CDT   Level 6 with SH INFUSION CHAIR 6   [facility] ([facility] [place])    [place] [id number]  [place] [address]   [phone]            Apr 25, 2018  8:45 AM CDT   Return Visit with Gretel Quinn MD   [facility] ([facility])    [facility]  [address]  [place] [address]   [phone]            May 09, 2018  8:00 AM CDT   Level 6 with  INFUSION CHAIR 5   [facility] ([facility] [place])    [place] [id number]  [place] [address]   [phone]              Who to contact     If you have questions or need follow up information about today's clinic visit or your schedule please contact [facility] directly at [phone].  Normal or non-critical lab and imaging results will be communicated to you by Etopushart, letter or phone within 4 business days after the clinic has received the results. If you do not hear from us within 7 days, please contact the clinic through GuideWallt or phone. If you have a critical or abnormal lab result, we will notify you by phone as soon as possible.  Submit refill requests through BioSeek or call your pharmacy and they will forward the refill request to us. Please allow 3 business days for your refill to be completed.          Additional Information About Your Visit        BioSeek Information     BioSeek lets you send messages to your doctor, view your test results, renew your prescriptions, schedule appointments and more. To sign up, go to  [email or web address] . Click on \"Log in\" on the left side of the screen, which will take you to the Welcome page. Then click on \"Sign up Now\" on the right side of the page.     You will be asked to enter the access code listed below, as well as some personal information. Please follow the directions to create your username and password.     Your access code is: [email or web address]  Expires: 3/26/2018 12:10 PM     Your access code will  in 90 days. If you need help or a new code, please call your [place] clinic or [phone].        Care EveryWhere ID     This is your Care EveryWhere ID. This could be used by other organizations to access your [place] medical records  [id number]        Your Vitals Were     Pulse Temperature Respirations BMI (Body Mass Index)          100 97.8  F (36.6  C) (Oral) 16 29.47 kg/m2         Blood Pressure from Last 3 Encounters:   18 110/60   18 110/62   18 164/78    Weight from Last 3 Encounters:   18 93.2 kg (205 lb 6.4 oz)   18 93.2 kg (205 lb 6.4 oz)   18 93.3 kg (205 lb 12.8 oz)              We Performed the Following     CBC with platelets differential          Today's Medication Changes          These changes are accurate as of 3/14/18  3:36 PM.  If you have any questions, ask your nurse or doctor.               Stop taking these medicines if you haven't already. Please contact your care team if you have questions.     dexamethasone 4 MG tablet   Commonly known as:  DECADRON   Stopped by:  Gretel Quinn MD                    Primary Care Provider Office Phone # Fax #    Dickson Reich [phone] [address] [facility] [address]        Equal Access to Services     [facility] HospitalAUDELIA : Jeanne Driver, consuelo zamarripa, qamelita sevilla . [facility] [phone].    ATENCIÓN: Si habla español, tiene a nolan disposición servicios gratuitos de asistencia " lingüísticaDevin Reyes al [phone].    We comply with applicable federal civil rights laws and [place] laws. We do not discriminate on the basis of race, color, national origin, age, disability, sex, sexual orientation, or gender identity.            Thank you!     Thank you for choosing [facility] AND [facility]  for your care. Our goal is always to provide you with excellent care. Hearing back from our patients is one way we can continue to improve our services. Please take a few minutes to complete the written survey that you may receive in the mail after your visit with us. Thank you!             Your Updated Medication List - Protect others around you: Learn how to safely use, store and throw away your medicines at www.disposemymeds.org.          This list is accurate as of 3/14/18  3:36 PM.  Always use your most recent med list.                   Brand Name Dispense Instructions for use Diagnosis    amLODIPine 5 MG tablet    NORVASC    90 tablet    TAKE 1 TABLET BY MOUTH DAILY    Essential hypertension, benign       * B-D U/F 31G X 8 MM   Generic drug:  insulin pen needle           * B-D U/F 31G X 8 MM   Generic drug:  insulin pen needle     500 each    USE 5 PEN NEEDLES PER DAY OR AS DIRECTED    Type 2 diabetes mellitus with stage 3 chronic kidney disease, with long-term current use of insulin (H)       Blood Glucose Monitoring Suppl Misc      3 times daily (before meals)        ferrous sulfate 325 (65 FE) MG tablet    IRON     Take 325 mg by mouth daily (with breakfast)        FLOMAX 0.4 MG capsule   Generic drug:  tamsulosin      Take 0.4 mg by mouth At Bedtime        * FREESTYLE LITE test strip   Generic drug:  blood glucose monitoring     200 strip    USE 1 STRIP TO TEST TWICE DAILY.    Uncontrolled type 1 diabetes mellitus with stage 3 chronic kidney disease (H)       * FREESTYLE LITE test strip   Generic drug:  blood glucose monitoring     400 strip    USE TO TEST FOUR TIMES DAILY     Uncontrolled type 1 diabetes mellitus with stage 3 chronic kidney disease (H)       furosemide 20 MG tablet    LASIX    90 tablet    Take 1 tablet (20 mg) by mouth daily    Multiple myeloma not having achieved remission (H)       gemfibrozil 600 MG tablet    LOPID    180 tablet    TAKE 1 TABLET BY MOUTH TWICE DAILY    Hyperlipidemia       * INSULIN ASPART SC      Inject Subcutaneous every morning Inject 6 unit subcutaneously one time a day for Diabetes II 100unit/mL before breakfast        * INSULIN ASPART SC      Inject Subcutaneous daily (before lunch) Inject 5 unit subcutaneously one time a day for Diabetes II before lunch        * INSULIN ASPART SC      Inject Subcutaneous At Bedtime Inject 4 unit subcutaneously at bedtime for Diabetes II Ok to use Humalog insulin with same order details        * insulin aspart 100 UNIT/ML injection    NovoLOG FLEXPEN    60 mL    Inject 4-17 units under the skin four times daily (with meals and nightly)    Uncontrolled type 2 diabetes mellitus with hyperglycemia, unspecified long term insulin use status (H), Type 2 diabetes mellitus with diabetic chronic kidney disease, unspecified CKD stage, unspecified long term insulin use status (H), Type 2 diabetes mellitus with stage 3 chronic kidney disease, with long-term current use of insulin (H)       * NovoLOG FLEXPEN 100 UNIT/ML injection   Generic drug:  insulin aspart     15 mL    INJECT 1 TO 5 UNITS UNDER THE SKIN FOUR TIMES DAILY WITH MEALS AND NIGHTLY    Uncontrolled type 2 diabetes mellitus with hyperglycemia, unspecified long term insulin use status (H)       insulin detemir 100 UNIT/ML injection    LEVEMIR FLEXPEN/FLEXTOUCH    15 mL    Inject 16 Units Subcutaneous every morning (before breakfast)    Type 2 diabetes mellitus with diabetic chronic kidney disease, unspecified CKD stage, unspecified long term insulin use status (H), Uncontrolled type 2 diabetes mellitus with hyperglycemia, unspecified long term insulin use  status (H), Type 2 diabetes mellitus with stage 3 chronic kidney disease, with long-term current use of insulin (H)       levothyroxine 25 MCG tablet    SYNTHROID/LEVOTHROID    90 tablet    TAKE 1 TABLET BY MOUTH EVERY DAY    Acquired hypothyroidism       LISINOPRIL PO      Take 15 mg by mouth daily        metoprolol tartrate 25 MG tablet    LOPRESSOR    180 tablet    TAKE 1 TABLET BY MOUTH TWICE DAILY    Essential hypertension       omeprazole 20 MG CR capsule    priLOSEC    90 capsule    TAKE 1 CAPSULE BY MOUTH EVERY DAY    Congenital hiatus hernia       polyethylene glycol Packet    MIRALAX/GLYCOLAX    7 packet    Take 17 g by mouth 2 times daily as needed (constipation)    Slow transit constipation       Selenium 200 MCG Tabs      Take 100 mcg by mouth daily. Pt takes one half tab of the 200 mcg daily        VITAMIN C PO      Take 1,000 mg by mouth daily        VITAMIN D (CHOLECALCIFEROL) PO      Take 1,000 Units by mouth        * Notice:  This list has 9 medication(s) that are the same as other medications prescribed for you. Read the directions carefully, and ask your doctor or other care provider to review them with you.

## 2018-03-14 NOTE — LETTER
"    3/14/2018         RE: Roc Parkinson  [address]   [place] [address]        Dear Colleague,    Thank you for referring your patient, Roc Parkinson, to the [facility]. Please see a copy of my visit note below.    Oncology Rooming Note    March 14, 2018 1:37 PM   Roc Parkinson is a 89 year old male who presents for:    Chief Complaint   Patient presents with     Oncology Clinic Visit     MDS (myelodysplastic syndrome) (H)     Initial Vitals: /60  Pulse 100  Temp 97.8  F (36.6  C) (Oral)  Resp 16  Wt 93.2 kg (205 lb 6.4 oz)  BMI 29.47 kg/m2 Estimated body mass index is 29.47 kg/(m^2) as calculated from the following:    Height as of 3/8/18: 1.778 m (5' 10\").    Weight as of this encounter: 93.2 kg (205 lb 6.4 oz). Body surface area is 2.15 meters squared.  Data Unavailable Comment: Data Unavailable   No LMP for male patient.  Allergies reviewed: Yes  Medications reviewed: Yes    Medications: Medication refills not needed today.  Pharmacy name entered into SnapAppointments:    [facility] - [place], [place] - [address] [facility] [address] [place] [address] [address] AT [facility]    Clinical concerns: no   5 minutes for nursing intake (face to face time)          Winsome Diaz MA                [facility]  HEMATOLOGY ONCOLOGY     DIAGNOSIS:    1- Kappa light chain IgM multiple myeloma in a 89-year-old patient.  Bone marrow biopsy on 11/17/2016 showed hypercellular bone marrow with 65% cellularity of light chain restricted plasma cells.  FISH or G-banding could not be performed due to insufficient sample.   There is a background of myelodysplastic syndrome.  The patient was initially seen in the hospital on 11/17/2016 for macrocytic anemia and pancytopenia and transfusion requirement and a bone marrow biopsy was performed.   2- History of prostate cancer s/p EBRT s/p brachytherapy in 2003 (recent " PSA 0.10), superficial bladder cancer ,no recurrences since 1986     TREATMENT: 12/15/16 velcade/dex. 4/12/17 added cyclophosphamide 300 mg weekly.6/28/17 we discontinued velcade due to concern for neuropathy and continued with weekly cyclophosphamide and dexamethasone.   1/2/18 switched to Carfilzomib and dexamethasone.      SUBJECTIVE:  The patient was seen as a followup today. We discussed about further plan of care for his myeloma.     REVIEW OF SYSTEMS:  A complete review of systems was performed and found to be negative other than pertinent positives mentioned in history of present illness.     Past medical, social histories reviewed.    Meds- Reviewed.     PHYSICAL EXAMINATION:   VITAL SIGNS:/60  Pulse 100  Temp 97.8  F (36.6  C) (Oral)  Resp 16  Wt 93.2 kg (205 lb 6.4 oz)  BMI 29.47 kg/m2  CONSTITUTIONAL: Sitting comfortably.   NEUROLOGIC: Alert, awake.   PSYCH: Anxious     LABORATORY DATA AND IMAGING REVIEWED DURING THIS VISIT:  Recent Labs   Lab Test  03/07/18   1130  02/20/18   1312  02/07/18   1100   01/22/18   0837  01/21/18   0834  01/20/18   0220   NA  137  138  140   < >  141  140  139   POTASSIUM  4.3  4.3  4.1   < >  4.1  4.1  4.1   CHLORIDE  109  109  108   < >  109  108  107   CO2  20  21  22   < >  23  22  22   ANIONGAP  8  8  10   < >  9  10  10   BUN  21  33*  24   < >  25  29  45*   CR  0.93  1.20  1.14   < >  1.01  1.02  1.45*   GLC  104*   --   241*   < >  110*  183*  75   MICHELLE  8.8   --   8.6   < >  8.9  8.8  9.1   MAG   --    --    --    --   2.3   --   2.2   PHOS   --    --    --    --    --   2.9  2.2*    < > = values in this interval not displayed.     Recent Labs   Lab Test  03/07/18   1130  02/21/18   1105  02/20/18   1312   WBC  4.2  4.9  5.3   HGB  8.7*  8.7*  9.1*   PLT  195  109*  88*   MCV  111*  111*  119*   NEUTROPHIL  88.1  90.9  79.3     Recent Labs   Lab Test  03/07/18   1130  02/07/18   1100  01/31/18   1030   11/17/16   0938   BILITOTAL  0.5  0.4  0.4   < >   --     ALKPHOS  99  99  100   < >   --    ALT  14  14  14   < >   --    AST  15  14  11   < >   --    ALBUMIN  3.1*  2.7*  2.7*   < >   --    LDH   --    --    --    --   110    < > = values in this interval not displayed.   Results for JACOB MEDELLIN (MRN [id number]) as of 3/14/2018 14:38   Ref. Range 1/3/2018 11:20 2/7/2018 11:00 3/7/2018 11:30   Gamma Fraction Latest Ref Range: 0.7 - 1.6 g/dL 2.0 (H) 2.0 (H) 2.3 (H)   IGA Latest Ref Range: 70 - 380 mg/dL 19 (L) 12 (L) 12 (L)   IGG Latest Ref Range: 695 - 1620 mg/dL 238 (L) 258 (L) 252 (L)   IGM Latest Ref Range: 60 - 265 mg/dL 2510 (H) 2952 (H) 3420 (H)   Immunofixation ELP Unknown (Note) (Note) (Note)   Kappa Free Lt Chain Latest Ref Range: 0.33 - 1.94 mg/dL 80.25 (H) 81.50 (H) 72.00 (H)   Kappa Lambda Ratio Latest Ref Range: 0.26 - 1.65  123.46 (H) 123.48 (H) 248.28 (H)   Lambda Free Lt Chain Latest Ref Range: 0.57 - 2.63 mg/dL 0.65 0.66 0.29 (L)   Monoclonal Peak Latest Ref Range: 0.0 g/dL 1.6 (H) 1.8 (H) 2.0 (H)     ECOG PS: 1    ASSESSMENT:  This is a 89-year-old gentleman who has kappa light chain multiple myeloma with hypercellular marrow with 65% of cells are light chain restricted plasma cells.  He had severe pancytopenia and has needed a transfusion in the hospital.  He did not have hypercalcemia and his renal function was normal. He has a background of myelodysplastic syndrome on his bone marrow biopsy; however, majority of the cell population was monoclonal plasma cell population.   He was started on treatment due to cytopenia.   In 4/2017 his light chain levels were getting worse. M spike was stable. We added cyclophosphamide to the regimen. In 6/2017 discontinued velcade for concern of development of neuropathy, in hindsight the pain appeared to be related to arthritis.     He is on Aranesp 300 mcg SQ every three weeks.  He is on Carfilzomib and dexamethasone.     - Labs were reviewed with the patient, he has a clear progression of myeloma. His M  spike, light chains are all worse.   We discussed about the option of switching to combination of daratumumab and dexamethasone. We had a detailed discussion about each chemotherapy agent, intent of treatment and also potential risks and benefits. We discussed the adverse effects related to each of chemotherapy agent. Patient asked questions and expressed willingness to proceed with the treatment.     PLAN:   1. Start Dartumumab/dexamethasone- chemo education today. He will start with weekly treatment. First dose will be split in two days  2. Zometa every 12 weeks  3. Continue Aranesp 300 mcg SQ every three weeks  4- See NP in 2 weeks  5- Continue Acyclovir  6- RTC MD 6 weeks    GRETEL QUINN MD    3/14/2018        Again, thank you for allowing me to participate in the care of your patient.        Sincerely,        Gretel Quinn MD

## 2018-03-14 NOTE — MR AVS SNAPSHOT
After Visit Summary   3/14/2018    Roc Parkinson    MRN: [id number]           Patient Information     Date Of Birth          7/7/1926        Visit Information        Provider Department      3/14/2018 2:15 PM Gretel Quinn MD [facility] [place]        Today's Diagnoses     Multiple myeloma not having achieved remission (H)    -  1      Care Instructions    1.  Start Dartumumab/dexamethasone- chemo education today.( DOne-CW) He will start with weekly treatment. First dose will be split in two days   Scheduled - Susy  2. Zometa every 12 weeks   Scheduled Susy  3. Continue Aranesp 300 mcg SQ every three weeks   Scheduled - Susy( TODAY)  4- See NP in 2 weeks   Scheduled - Susy  5- Continue Acyclovir  6- RTC MD 6 weeks   Scheduled - Susy      AVS given to patient - Susy          Follow-ups after your visit        Your next 10 appointments already scheduled     Mar 22, 2018  8:00 AM CDT   Level 6 with SH INFUSION CHAIR 3   [facility] ([facility])    [facility]  [address]  [place] [address]   [phone]            Mar 23, 2018  8:00 AM CDT   Level 6 with SH INFUSION CHAIR 5   [facility] ([facility])    [facility]  [address]  [place] [address]   [phone]            Mar 29, 2018  8:00 AM CDT   Level 6 with SH INFUSION CHAIR 5   [facility] [place] [facility] ([facility])    [facility]  [address]  [place] [address]   [phone]            Mar 29, 2018 10:00 AM CDT   Return Visit with ALEXANDER Salazar CNP   [facility] [place] ([facility])    [facility]  [address]  [place] [address]   [phone]            Apr 05, 2018  7:30 AM CDT   Level 6 with SH INFUSION CHAIR 1   [facility] ([facility])    [facility] [place]  [address]  [place] [address]   [phone]            Apr 11, 2018  8:00 AM CDT   Level 6 with SH INFUSION CHAIR 6   [facility] ([facility])    [facility] [place]  [address]  [place] [address]   [phone]            Apr 18, 2018  8:00 AM CDT   Level 6 with SH INFUSION CHAIR 4   [facility] ([facility])    [facility] [place]  [address]  [place] [address]   [phone]            Apr 25, 2018  8:00 AM CDT   Level 6 with SH INFUSION CHAIR 6   [facility] ([facility])    [facility] [place]  [address]  [place] [address]   [phone]            Apr 25, 2018  8:45 AM CDT   Return Visit with Gretel Quinn MD   [facility] ([facility])    [facility]  [address]  [place] [address]   [phone]            May 09, 2018  8:00 AM CDT   Level 6 with SH INFUSION CHAIR 5   [facility] ([facility])    [facility]  [address]  [place] [address]   [phone]              Who to contact     If you have questions or need follow up information about today's clinic visit or your schedule please contact [facility] directly at [phone].  Normal or non-critical lab and imaging results will be communicated to you by MyChart, letter or phone within 4 business days after the clinic has received the results. If you do not hear from us within 7 days, please contact the clinic through MyChart or phone. If you have a critical or abnormal lab result, we will notify you by phone as soon as possible.  Submit refill requests through [email or web address] or call  "your pharmacy and they will forward the refill request to us. Please allow 3 business days for your refill to be completed.          Additional Information About Your Visit        MyChart Information     [employer] lets you send messages to your doctor, view your test results, renew your prescriptions, schedule appointments and more. To sign up, go to [email or web address] . Click on \"Log in\" on the left side of the screen, which will take you to the Welcome page. Then click on \"Sign up Now\" on the right side of the page.     You will be asked to enter the access code listed below, as well as some personal information. Please follow the directions to create your username and password.     Your access code is: [email or web address]  Expires: 3/26/2018 12:10 PM     Your access code will  in 90 days. If you need help or a new code, please call your [facility] clinic or [phone].        Care EveryWhere ID     This is your Care EveryWhere ID. This could be used by other organizations to access your [facility] medical records  [id number]        Your Vitals Were     Pulse Temperature Respirations BMI (Body Mass Index)          100 97.8  F (36.6  C) (Oral) 16 29.47 kg/m2         Blood Pressure from Last 3 Encounters:   18 110/60   18 110/62   18 164/78    Weight from Last 3 Encounters:   18 93.2 kg (205 lb 6.4 oz)   18 93.2 kg (205 lb 6.4 oz)   18 93.3 kg (205 lb 12.8 oz)              Today, you had the following     No orders found for display         Today's Medication Changes          These changes are accurate as of 3/14/18 11:59 PM.  If you have any questions, ask your nurse or doctor.               Stop taking these medicines if you haven't already. Please contact your care team if you have questions.     dexamethasone 4 MG tablet   Commonly known as:  DECADRON   Stopped by:  Gretel Quinn MD                    Primary Care Provider Office Phone # Fax #    Dickson Reich MD " [phone]       [address]  [facility] [address]        Equal Access to Services     ELIE GAGNON : Hadii virginia wilson jori Driver, waemoryda luqadaha, qarafyta kaalmada paddy, melita rodriguez. So [facility] [phone].    ATENCIÓN: Si habla español, tiene a nolan disposición servicios gratuitos de asistencia lingüística. Llame al [phone].    We comply with applicable federal civil rights laws and [place] laws. We do not discriminate on the basis of race, color, national origin, age, disability, sex, sexual orientation, or gender identity.            Thank you!     Thank you for choosing [facility]  for your care. Our goal is always to provide you with excellent care. Hearing back from our patients is one way we can continue to improve our services. Please take a few minutes to complete the written survey that you may receive in the mail after your visit with us. Thank you!             Your Updated Medication List - Protect others around you: Learn how to safely use, store and throw away your medicines at www.disposemymeds.org.          This list is accurate as of 3/14/18 11:59 PM.  Always use your most recent med list.                   Brand Name Dispense Instructions for use Diagnosis    amLODIPine 5 MG tablet    NORVASC    90 tablet    TAKE 1 TABLET BY MOUTH DAILY    Essential hypertension, benign       * B-D U/F 31G X 8 MM   Generic drug:  insulin pen needle           * B-D U/F 31G X 8 MM   Generic drug:  insulin pen needle     500 each    USE 5 PEN NEEDLES PER DAY OR AS DIRECTED    Type 2 diabetes mellitus with stage 3 chronic kidney disease, with long-term current use of insulin (H)       Blood Glucose Monitoring Suppl Misc      3 times daily (before meals)        ferrous sulfate 325 (65 FE) MG tablet    IRON     Take 325 mg by mouth daily (with breakfast)        FLOMAX 0.4 MG capsule   Generic drug:  tamsulosin      Take 0.4 mg by mouth At Bedtime         * FREESTYLE LITE test strip   Generic drug:  blood glucose monitoring     200 strip    USE 1 STRIP TO TEST TWICE DAILY.    Uncontrolled type 1 diabetes mellitus with stage 3 chronic kidney disease (H)       * FREESTYLE LITE test strip   Generic drug:  blood glucose monitoring     400 strip    USE TO TEST FOUR TIMES DAILY    Uncontrolled type 1 diabetes mellitus with stage 3 chronic kidney disease (H)       furosemide 20 MG tablet    LASIX    90 tablet    Take 1 tablet (20 mg) by mouth daily    Multiple myeloma not having achieved remission (H)       gemfibrozil 600 MG tablet    LOPID    180 tablet    TAKE 1 TABLET BY MOUTH TWICE DAILY    Hyperlipidemia       * INSULIN ASPART SC      Inject Subcutaneous every morning Inject 6 unit subcutaneously one time a day for Diabetes II 100unit/mL before breakfast        * INSULIN ASPART SC      Inject Subcutaneous daily (before lunch) Inject 5 unit subcutaneously one time a day for Diabetes II before lunch        * INSULIN ASPART SC      Inject Subcutaneous At Bedtime Inject 4 unit subcutaneously at bedtime for Diabetes II Ok to use Humalog insulin with same order details        * insulin aspart 100 UNIT/ML injection    NovoLOG FLEXPEN    60 mL    Inject 4-17 units under the skin four times daily (with meals and nightly)    Uncontrolled type 2 diabetes mellitus with hyperglycemia, unspecified long term insulin use status (H), Type 2 diabetes mellitus with diabetic chronic kidney disease, unspecified CKD stage, unspecified long term insulin use status (H), Type 2 diabetes mellitus with stage 3 chronic kidney disease, with long-term current use of insulin (H)       * NovoLOG FLEXPEN 100 UNIT/ML injection   Generic drug:  insulin aspart     15 mL    INJECT 1 TO 5 UNITS UNDER THE SKIN FOUR TIMES DAILY WITH MEALS AND NIGHTLY    Uncontrolled type 2 diabetes mellitus with hyperglycemia, unspecified long term insulin use status (H)       insulin detemir 100 UNIT/ML injection     LEVEMIR FLEXPEN/FLEXTOUCH    15 mL    Inject 16 Units Subcutaneous every morning (before breakfast)    Type 2 diabetes mellitus with diabetic chronic kidney disease, unspecified CKD stage, unspecified long term insulin use status (H), Uncontrolled type 2 diabetes mellitus with hyperglycemia, unspecified long term insulin use status (H), Type 2 diabetes mellitus with stage 3 chronic kidney disease, with long-term current use of insulin (H)       levothyroxine 25 MCG tablet    SYNTHROID/LEVOTHROID    90 tablet    TAKE 1 TABLET BY MOUTH EVERY DAY    Acquired hypothyroidism       LISINOPRIL PO      Take 15 mg by mouth daily        metoprolol tartrate 25 MG tablet    LOPRESSOR    180 tablet    TAKE 1 TABLET BY MOUTH TWICE DAILY    Essential hypertension       omeprazole 20 MG CR capsule    priLOSEC    90 capsule    TAKE 1 CAPSULE BY MOUTH EVERY DAY    Congenital hiatus hernia       polyethylene glycol Packet    MIRALAX/GLYCOLAX    7 packet    Take 17 g by mouth 2 times daily as needed (constipation)    Slow transit constipation       Selenium 200 MCG Tabs      Take 100 mcg by mouth daily. Pt takes one half tab of the 200 mcg daily        VITAMIN C PO      Take 1,000 mg by mouth daily        VITAMIN D (CHOLECALCIFEROL) PO      Take 1,000 Units by mouth        * Notice:  This list has 9 medication(s) that are the same as other medications prescribed for you. Read the directions carefully, and ask your doctor or other care provider to review them with you.

## 2018-03-14 NOTE — PROGRESS NOTES
"Oncology Rooming Note    March 14, 2018 1:37 PM   Roc Parkinson is a 89 year old male who presents for:    Chief Complaint   Patient presents with     Oncology Clinic Visit     MDS (myelodysplastic syndrome) (H)     Initial Vitals: /60  Pulse 100  Temp 97.8  F (36.6  C) (Oral)  Resp 16  Wt 93.2 kg (205 lb 6.4 oz)  BMI 29.47 kg/m2 Estimated body mass index is 29.47 kg/(m^2) as calculated from the following:    Height as of 3/8/18: 1.778 m (5' 10\").    Weight as of this encounter: 93.2 kg (205 lb 6.4 oz). Body surface area is 2.15 meters squared.  Data Unavailable Comment: Data Unavailable   No LMP for male patient.  Allergies reviewed: Yes  Medications reviewed: Yes    Medications: Medication refills not needed today.  Pharmacy name entered into [employer]:    [facility] [place], [place] - [address] [place] [address] DRUG STORE [address] [place] [address] [address] AT [address]    Clinical concerns: no   5 minutes for nursing intake (face to face time)          Winsome Diaz MA              "

## 2018-03-14 NOTE — PROGRESS NOTES
[facility]  HEMATOLOGY ONCOLOGY     DIAGNOSIS:    1- Kappa light chain IgM multiple myeloma in a 89-year-old patient.  Bone marrow biopsy on 11/17/2016 showed hypercellular bone marrow with 65% cellularity of light chain restricted plasma cells.  FISH or G-banding could not be performed due to insufficient sample.   There is a background of myelodysplastic syndrome.  The patient was initially seen in the hospital on 11/17/2016 for macrocytic anemia and pancytopenia and transfusion requirement and a bone marrow biopsy was performed.   2- History of prostate cancer s/p EBRT s/p brachytherapy in 2003 (recent PSA 0.10), superficial bladder cancer ,no recurrences since 1986     TREATMENT: 12/15/16 velcade/dex. 4/12/17 added cyclophosphamide 300 mg weekly.6/28/17 we discontinued velcade due to concern for neuropathy and continued with weekly cyclophosphamide and dexamethasone.   1/2/18 switched to Carfilzomib and dexamethasone.      SUBJECTIVE:  The patient was seen as a followup today. We discussed about further plan of care for his myeloma.     REVIEW OF SYSTEMS:  A complete review of systems was performed and found to be negative other than pertinent positives mentioned in history of present illness.     Past medical, social histories reviewed.    Meds- Reviewed.     PHYSICAL EXAMINATION:   VITAL SIGNS:/60  Pulse 100  Temp 97.8  F (36.6  C) (Oral)  Resp 16  Wt 93.2 kg (205 lb 6.4 oz)  BMI 29.47 kg/m2  CONSTITUTIONAL: Sitting comfortably.   NEUROLOGIC: Alert, awake.   PSYCH: Anxious     LABORATORY DATA AND IMAGING REVIEWED DURING THIS VISIT:  Recent Labs   Lab Test  03/07/18   1130  02/20/18   1312  02/07/18   1100   01/22/18   0837  01/21/18   0834  01/20/18   0220   NA  137  138  140   < >  141  140  139   POTASSIUM  4.3  4.3  4.1   < >  4.1  4.1  4.1   CHLORIDE  109  109  108   < >  109  108  107   CO2  20  21  22   < >  23  22  22   ANIONGAP  8  8  10   < >  9  10  10   BUN  21   33*  24   < >  25  29  45*   CR  0.93  1.20  1.14   < >  1.01  1.02  1.45*   GLC  104*   --   241*   < >  110*  183*  75   MICHELLE  8.8   --   8.6   < >  8.9  8.8  9.1   MAG   --    --    --    --   2.3   --   2.2   PHOS   --    --    --    --    --   2.9  2.2*    < > = values in this interval not displayed.     Recent Labs   Lab Test  03/07/18   1130  02/21/18   1105  02/20/18   1312   WBC  4.2  4.9  5.3   HGB  8.7*  8.7*  9.1*   PLT  195  109*  88*   MCV  111*  111*  119*   NEUTROPHIL  88.1  90.9  79.3     Recent Labs   Lab Test  03/07/18   1130  02/07/18   1100  01/31/18   1030   11/17/16   0938   BILITOTAL  0.5  0.4  0.4   < >   --    ALKPHOS  99  99  100   < >   --    ALT  14  14  14   < >   --    AST  15  14  11   < >   --    ALBUMIN  3.1*  2.7*  2.7*   < >   --    LDH   --    --    --    --   110    < > = values in this interval not displayed.   Results for LACIE JACOB G (MRN [id number]) as of 3/14/2018 14:38   Ref. Range 1/3/2018 11:20 2/7/2018 11:00 3/7/2018 11:30   Gamma Fraction Latest Ref Range: 0.7 - 1.6 g/dL 2.0 (H) 2.0 (H) 2.3 (H)   IGA Latest Ref Range: 70 - 380 mg/dL 19 (L) 12 (L) 12 (L)   IGG Latest Ref Range: 695 - 1620 mg/dL 238 (L) 258 (L) 252 (L)   IGM Latest Ref Range: 60 - 265 mg/dL 2510 (H) 2952 (H) 3420 (H)   Immunofixation ELP Unknown (Note) (Note) (Note)   Kappa Free Lt Chain Latest Ref Range: 0.33 - 1.94 mg/dL 80.25 (H) 81.50 (H) 72.00 (H)   Kappa Lambda Ratio Latest Ref Range: 0.26 - 1.65  123.46 (H) 123.48 (H) 248.28 (H)   Lambda Free Lt Chain Latest Ref Range: 0.57 - 2.63 mg/dL 0.65 0.66 0.29 (L)   Monoclonal Peak Latest Ref Range: 0.0 g/dL 1.6 (H) 1.8 (H) 2.0 (H)     ECOG PS: 1    ASSESSMENT:  This is a 89-year-old gentleman who has kappa light chain multiple myeloma with hypercellular marrow with 65% of cells are light chain restricted plasma cells.  He had severe pancytopenia and has needed a transfusion in the hospital.  He did not have hypercalcemia and his renal function was  normal. He has a background of myelodysplastic syndrome on his bone marrow biopsy; however, majority of the cell population was monoclonal plasma cell population.   He was started on treatment due to cytopenia.   In 4/2017 his light chain levels were getting worse. M spike was stable. We added cyclophosphamide to the regimen. In 6/2017 discontinued velcade for concern of development of neuropathy, in hindsight the pain appeared to be related to arthritis.     He is on Aranesp 300 mcg SQ every three weeks.  He is on Carfilzomib and dexamethasone.     - Labs were reviewed with the patient, he has a clear progression of myeloma. His M spike, light chains are all worse.   We discussed about the option of switching to combination of daratumumab and dexamethasone. We had a detailed discussion about each chemotherapy agent, intent of treatment and also potential risks and benefits. We discussed the adverse effects related to each of chemotherapy agent. Patient asked questions and expressed willingness to proceed with the treatment.     PLAN:   1. Start Dartumumab/dexamethasone- chemo education today. He will start with weekly treatment. First dose will be split in two days  2. Zometa every 12 weeks  3. Continue Aranesp 300 mcg SQ every three weeks  4- See NP in 2 weeks  5- Continue Acyclovir  6- RTC MD 6 weeks    ALISON JON MD    3/14/2018

## 2018-03-21 NOTE — TELEPHONE ENCOUNTER
Called and spoke with Roc about his treatment tomorrow and Friday.  Per Alvarez he Does NOT have to take the oral dex prior to the 3/22 and 3/23/18 treatment since it is split up into 2 doses/ he will be getting 20 mg iv dex both days and taking post treatment dex sat and Sunday.

## 2018-03-22 PROBLEM — I48.91 ATRIAL FIBRILLATION WITH RVR (H): Status: ACTIVE | Noted: 2018-01-01

## 2018-03-22 NOTE — IP AVS SNAPSHOT
` `     [facility] CARDIAC SPECIALTY CARE: [phone]                 INTERAGENCY TRANSFER FORM - NOTES (H&P, Discharge Summary, Consults, Procedures, Therapies)   3/22/2018                    Hospital Admission Date: 3/22/2018  JACOB MEDELLIN   : 1926  Sex: Male        Patient PCP Information     Provider PCP Type    Dickson Reich MD General         History & Physicals      H&P signed by Manjeet Dawson MD at 3/23/2018  8:40 AM      Author:  Manjeet Dawson MD Service:  Hospitalist Author Type:  Physician    Filed:  3/23/2018  8:40 AM Date of Service:  3/22/2018  4:41 PM Creation Time:  3/23/2018  8:09 AM    Status:  Signed :  Manjeet Dawson MD (Physician)         Admitted:     2018      PRIMARY CARE PROVIDER:  Dickson Reich MD      PRIMARY HEMATOLOGIST/ONCOLOGIST:  Gretel Quinn MD      CHIEF COMPLAINT:  Transferred from Oncology Clinic for treatment of AFib with RVR.      HISTORY OF PRESENT ILLNESS:  A 89-year-old gentleman with history of multiple myeloma, MDS, bladder CA, prostate CA, paroxysmal AFib, ELVIRA, HLD, HTN and numerous other medical conditions.  Mr. Medellin was diagnosed with multiple myeloma in 2016.  He was initiated on chemotherapy in 2016 under the care of Dr. Quinn, initially treated with Velcade and dexamethasone.  Cytoxan was subsequently added in 2018, he was initiated on a second type of chemotherapy consisting of carfilzomib and dexamethasone, which he received from -2018 as well as Zometa.  On 2018, multiple myeloma lab results showed progression of disease.  Dr. Quinn subsequently recommended daratumumab/dexamethasone.  Today was cycle 1 day 1 of his daratumumab/dexamethasone.  He was premedicated with Tylenol, Benadryl and dexamethasone for the chemo.  During infusion of chemo (daratumumab/dexamethasone), the patient developed cough, shortness of breath, palpitations and tremors.  It was felt the patient reacted to daratumumab,  thus was treated with Solu-Medrol, EpiPen and Demerol.  Stat EKG showed AFib with RVR, ventricular rate 140-150.  He was placed on O2 supplement.  911 was called and the patient was brought to [facility] ED for further evaluation.  On arrival to the ED, the patient was still in AFib with RVR.  He was initiated on diltiazem drip and was subsequently admitted.      Please note that Mr. Parkinson has a history of AFib, but was taken off chemical anticoagulation about a year ago due to a lower GI bleed.      PAST MEDICAL HISTORY:   1.  Multiple myeloma, diagnosed in 11/2016, currently on third different type of chemotherapy.   2.  Myelodysplastic syndrome.   3.  Prostate cancer.   4.  Bladder cancer.   5.  Paroxysmal AFib, no longer on chemical anticoagulation due to lower GI bleed.   6.  Iron-deficiency anemia, transfusion dependent.   7.  CKD III.   8.  Diabetes mellitus, type 2.   9.  History of ESBL UTI.   10.  Vitamin D deficiency.   11.  IBS.   12.  Hypothyroidism.   13.  Hyperlipidemia.   14.  Hyperglycemia.   15.  Hypertension.   16.  Dysphagia.   17.  Carpal tunnel syndrome.   18.  Congenital hiatal hernia.   19.  Chemo-induced neutropenia.[AO1.1]     Past Surgical History:   Procedure Laterality Date     ARTHROPLASTY KNEE  11/5/2012    Procedure: ARTHROPLASTY KNEE;  RIGHT TOTAL KNEE ARTHROPLASTY (SMITH & NEPHEW)^;  Surgeon: Dickson Schulte MD;  Location:  OR     BIOPSY      bladder     COLONOSCOPY  2007     COLONOSCOPY N/A 11/15/2016    Procedure: COMBINED COLONOSCOPY, SINGLE OR MULTIPLE BIOPSY/POLYPECTOMY BY BIOPSY;  Surgeon: Kenneth Fulton MD;  Location:  GI     GENITOURINARY SURGERY      TURP x2 and bladder scraping     GI SURGERY      anal fistula     ORTHOPEDIC SURGERY      lt knee in nov.2009     PHACOEMULSIFICATION CLEAR CORNEA WITH STANDARD INTRAOCULAR LENS IMPLANT Left 10/25/2017    Procedure: PHACOEMULSIFICATION CLEAR CORNEA WITH STANDARD INTRAOCULAR LENS IMPLANT;  LEFT EYE PHACOEMULSIFICATION  CLEAR CORNEA WITH STANDARD INTRAOCULAR LENS IMPLANT ;  Surgeon: Shady Haider MD;  Location: [facility]     PHACOEMULSIFICATION CLEAR CORNEA WITH STANDARD INTRAOCULAR LENS IMPLANT Right 11/1/2017    Procedure: PHACOEMULSIFICATION CLEAR CORNEA WITH STANDARD INTRAOCULAR LENS IMPLANT;  RIGHT EYE PHACOEMULSIFICATION CLEAR CORNEA WITH STANDARD INTRAOCULAR LENS IMPLANT;  Surgeon: Shady Haider MD;  Location: [facility]     SOFT TISSUE SURGERY      melanoma[AO1.2]        ALLERGIES:  NO KNOWN DRUG ALLERGIES.      OUTPATIENT MEDICATIONS:   1.  Ativan 0.5 mg p.o. q.4 hours p.r.n.   2.  Compazine 10 mg p.o. q.6 hours p.r.n.   3.  Decadron 4 mg p.o. with breakfast for 2 days following each daratumumab infusion.   4.  Acyclovir 400 mg p.o. b.i.d., start 1 week prior to daratumumab and continue for 3 months after treatment is complete.   5.  Omeprazole 20 mg p.o. daily.   6.  Levothyroxine 25 mcg p.o. daily.   7.  NovoLog 1-5 units under the skin 4 times a day with meals and at bedtime.   8.  Aspart sliding scale insulin.   9.  Vitamin D 1000 units p.o. daily.   10.  Metoprolol 25 mg p.o. b.i.d.   11.  MiraLax 17 g powder p.o. daily.   12.  Levemir insulin 16 units subq q.a.m.   13.  Lisinopril 50 mg p.o. daily.   14.  Amlodipine 5 mg p.o. daily.   15.  Lasix 20 mg p.o. daily.   16.  Gemfibrozil 600 mg p.o. b.i.d.   17.  Vitamin C 1000 mg p.o. daily.   18.  Ferrous sulfate 325 mg p.o. daily with breakfast.   19.  Selenium 100 mg p.o. daily.      SOCIAL HISTORY:  Never smoker, denies alcohol or IV drug use.      FAMILY HISTORY:  Reviewed and noncontributory to the patient's current admission.      REVIEW OF SYSTEMS:  Ten organ systems were checked and were all negative other than what was mentioned in the HPI.      PHYSICAL EXAMINATION:   VITAL SIGNS:  Temperature is 100.0, blood pressure 129/81, heart rate , respirations 16, 97% saturation on 2 liters nasal cannula.   GENERAL:  Alert and oriented x 3, no apparent distress.    HEENT:  Normocephalic, atraumatic.  Pupils equal, round, reactive to light.   NECK:  Supple, oropharynx clear, mucous membranes moist.  No thyromegaly.   CARDIOVASCULAR:  Irregularly irregular, no murmurs, rubs or gallops.   LUNGS:  Clear to auscultation bilaterally.   ABDOMEN:  Soft, good bowel sounds, nontender, no rebound or guarding.   EXTREMITIES:  No cyanosis or clubbing.   LYMPHATICS:  Trace edema.   NEUROLOGIC:  Nonfocal.   SKIN:  No rash.   MUSCULOSKELETAL:  No calf tenderness to palpation.   PSYCHIATRY:  Mood and affect are appropriate        DIAGNOSTIC DATA:  EKG:  Personally reviewed by me.  It showed AFib with RVR, right bundle branch block, no ischemia, ventricular rate 140.      LABORATORY DATA:  Sodium 140, potassium 3.5, chloride 108, carbon dioxide 23, BUN 24, creatinine 0.95, glucose 168, calcium 8.5, normal liver function tests except for albumin of 2.7, lactic acid mildly elevated at 2.2, WBC 5.6, hemoglobin 8.6, platelet count 91,000.      ASSESSMENT AND PLAN:   A 89-year-old gentleman with history of multiple myeloma initially diagnosed in 11/2016, MDS, prostate carcinoma, bladder carcinoma, paroxysmal atrial fibrillation, HTN, HLD, pancytopenia due to multiple myeloma and chemotherapy and numerous other medical conditions.  He was undergoing new chemotherapy, cycle 1 day 1 of daratumumab/dexamethasone when the patient acutely developed cough, shortness of breath, palpitations and tremors.  Treated with Solu-Medrol, epinephrine and Demerol.  EKG showed atrial fibrillation with rapid ventricular response, ventricular rate of 140-150s, sent to [facility] ED for further evaluation and treatment.       1.   Atrial fibrillation with rapid ventricular response:   The patient has underlying paroxysmal atrial fibrillation.  He is on metoprolol 25 mg p.o. b.i.d. for rate control.  Chemical anticoagulation discontinued about a year ago due to lower gastrointestinal bleed.  Also has a h/o  transfusion-dependent anemia due to underlying multiple myeloma, thus not a candidate for chemical anticoagulation.  He converted to atrial fibrillation with rapid ventricular response today while undergoing cycle 1 day 1 of daratumumab/dexamethasone.  Will try to control his rate with diltiazem drip.  I will keep him on PTA metoprolol if blood pressure can tolerate it.  Will gently hydrate the patient with IV fluid.  Echocardiogram on 2018 showed an ejection fraction of 65%, no regional wall motion abnormalities and no hemodynamically significant valvular heart disease, thus will not repeat one today.  We will check TSH and troponin level.     2.   Multiple myeloma:    Today was cycle 1 day 1 of daratumumab/dexamethasone due to progression of his disease.  Plan was to resume this chemotherapy tomorrow.  Will consult with Dr. Quinn of Hematology/Oncology Service.     3.   HTN and HLD:   Resume PTA Lasix, amlodipine, lisinopril, metoprolol and gemfibrozil.     4.   Diabetes mellitus, type 2:   Resume PTA Levemir.  Add medium intensity sliding scale insulin.     5.   Pancytopenia:   Likely due to underlying multiple myeloma and recent chemotherapy.  Hemoglobin is 8.6 grams today.  The patient states he is scheduled to get packed red blood cells transfusion at the Oncology Clinic tomorrow.  Will defer decision to Dr. Quinn or his colleagues.       6.   DVT prophylaxis:   Sequentials.      CODE STATUS:   DNR/DNI per prior record.      Revised account 2018  trevor         KIRT DAWSON MD             D: 2018   T: 2018   MT: LW      Name:     JACOB MEDELLIN   MRN:      [id number]        Account:      [id number]   :      1926        Admitted:     2018                   Document: [id number][AO1.1]         Revision History        User Key Date/Time User Provider Type Action    > AO1.1 3/23/2018  8:40 AM Kirt Dawson MD Physician Sign     AO1.2 3/23/2018  8:35 AM Kirt Dawson MD  Physician Edit     [N/A] 3/23/2018  8:09 AM Manjeet Dawson MD Physician Edit            H&P by Manjeet Dawson MD at 3/22/2018  3:30 PM     Author:  Manjeet Dawson MD Service:  Hospitalist Author Type:  Physician    Filed:  3/22/2018 11:44 PM Date of Service:  3/22/2018  3:30 PM Creation Time:  3/22/2018 11:42 PM    Status:  Signed :  Manjeet Dawson MD (Physician)         H&P dictated.    Manjeet Dawson MD.   Hospitalist.  [phone], pager.[AO1.1]       Revision History        User Key Date/Time User Provider Type Action    > AO1.1 3/22/2018 11:44 PM Manjeet Dawson MD Physician Sign                     Discharge Summaries      Discharge Summaries by Cindy Kitchen MD at 3/27/2018 11:10 AM     Author:  Cindy Kitchen MD Service:  Hospitalist Author Type:  Physician    Filed:  3/27/2018 11:24 AM Date of Service:  3/27/2018 11:10 AM Creation Time:  3/27/2018 11:09 AM    Status:  Signed :  Cindy Kitchen MD (Physician)         [facility]    Discharge Summary  Hospitalist    Date of Admission:  3/22/2018  Date of Discharge:  3/27/2018  Discharging Provider:[KR1.1] Cindy Kitchen[KR1.2]  Date of Service (when I saw the patient): 03/27/18[KR1.1]    Discharge Diagnoses[KR1.2]   Paroxysmal atrial fibrillation with RVR  Suspected bibasilar pneumonia R>L with right pleural effusion and hypoxic resp failure  Mild diastolic congestive heart failure  Multiple myeloma  Hypertension  Type 2 diabetes  Chemotherapy-induced pancytopenia  Hypothyroidism  GERD[KR1.3]      History of Present Illness[KR1.2]   Roc Parkinson is an 89 year old male with PmHx of multiple myeloma initially diagnosed in 11/2016, MDS, prostate carcinoma, bladder carcinoma, paroxysmal atrial fibrillation, Hypertension, Hyperlipidemia, pancytopenia due to multiple myeloma and chemotherapy, who was admitted on 3/22/18 from the Oncology Clinic, due to symptoms of cough, shortness of breath, palpitations and  tremors, 1.5hrs into receiving his 1st cycle of daratumumab/dexamethasone. He was found to be in AFIB rate 140s-150s and sent to the ER.  See H & P for details.[KR1.1]     Hospital Course[KR1.2]   Roc Parkinson was admitted on 3/22/2018.  The following problems were addressed during his hospitalization:    Paroxysmal Atrial fibrillation with rapid ventricular response:  Presented with RVR, currently in NSR. S/P IV diltiazem 10mg x 2 doses and IV diltiazem drip.  Transitioned to toprol XL 50mg qd.The pt is not on anticoagulation due to history of lower gastrointestinal bleed.  ECHO on 01/11/2018 showed, LVEF 65%, no regional wall motion abnormalities, RV was normal, no hemodynamically significant valvular abnormalities. TSH on 3/23/18 was normal.    -- will continue Toprol XL daily. PTA Lopressor d/c'd     Suspected bibasal pneumonia R>L, with right pleural effusion and hypoxic respiratory failure:   The pt is immunocompromised with underlying active myeloma and recent chemotherapy. Initially treated with vanc/zosyn and Levaquin. Cultures remain negative. Weaned off oxygen this morning. vanc discontinued yesterday and d/c zosyn today.    - will discharge on Levaquin daily x 7 more days to complete 10 days course.   - SLP eval completed. No aspiration noted. Discharging on DD3 diet and patient prefers that.   - weaned off oxygen this AM     Mild congetive heart failure: BNP was 7826 on 3/25/18. D-dimer on 3/25/18 was normal. S/P IV lasix 40mg x 1 dose with good output 3/25/18. ECHO on 3/26/18 revealed, LVEF 55-60%, LV and RV size are normal. LA is mildly dilated.   RA is normal. No regional wall motion abnormalities were noted  - resume PTA lasix at discharge.      Multiple myeloma:  Appreciate Oncology input. pt was commenced on daratumumab/dexamethasone on 3/22/18,  due to progression of his myeloma. Continue acyclovir prophylaxis for herpes zoster. outpt Oncology follow up.       Hypertension:  Continue  amlodipine 10mg qd and toprol 50mg XL.     Type 2, Diabetes mellitus:  Pt is on oral dexamethasone qd with chemotherapy. HbA1C was 5.5% on 3/22/18. Continue Levemir  Decreased to 10 units qd. Stop glipizide. Continue mealtime Aspart per PTA regimen    Chemotherapy induced pancytopenia: [KR1.1]  Hemoglobin Otoniel 7.6 s/p 1 unit pRBC in hospital. Hgb 10 on day of discharge.   WBC 3.5, ANC 2.6 on day of discharge  Plat 96.   - no signs of active bleed and stable hemodynamically[KR1.3]    Hypothyroidism: continue levothyroxine.      GERD Prophylaxis: continue omeprazole.        # Discharge Pain Plan:   - Patient currently has NO PAIN and is not being prescribed pain medications on discharge.    Cindy Kitchen[KR1.1]    Significant Results and Procedures[KR1.2]   See labs and imaging below.[KR1.1]     Pending Results   Unresulted Labs Ordered in the Past 30 Days of this Admission     No orders found from 1/21/2018 to 3/23/2018.          Code Status[KR1.2]   DNR / DNI[KR1.1]       Primary Care Physician   Dickson Reich    Physical Exam   Temp: 97.6  F (36.4  C) Temp src: Oral BP: 160/83   Heart Rate: 71 Resp: 18 SpO2: 94 % O2 Device: None (Room air) Oxygen Delivery: 2 LPM  Vitals:    03/24/18 0640 03/26/18 0700 03/27/18 0450   Weight: 95.1 kg (209 lb 9.6 oz) 91.7 kg (202 lb 3.2 oz) 90.3 kg (199 lb)[KR1.2]     Vital Signs with Ranges[KR1.1]  Temp:  [97.6  F (36.4  C)-98.3  F (36.8  C)] 97.6  F (36.4  C)  Heart Rate:  [64-78] 71  Resp:  [18] 18  BP: (156-166)/(73-83) 160/83  SpO2:  [94 %-97 %] 94 %  I/O last 3 completed shifts:  In: 1180 [P.O.:1180]  Out: 2050 [Urine:2050][KR1.2]  General: alert, awake and no apparent distress  CVS: regular rate and rhythm  Respiratory: right lung base mild crackles  Abd: soft and non-tender  Ext: no LE edema[KR1.1]    Discharge Disposition[KR1.2]   Discharged to short-term care facility  Condition at discharge: Stable[KR1.1]    Consultations This Hospital Stay   HEMATOLOGY &  ONCOLOGY IP CONSULT  SPEECH LANGUAGE PATH ADULT IP CONSULT  PHYSICAL THERAPY ADULT IP CONSULT  OCCUPATIONAL THERAPY ADULT IP CONSULT  PHARMACY TO DOSE JOANN  PHYSICAL THERAPY ADULT IP CONSULT  OCCUPATIONAL THERAPY ADULT IP CONSULT    Time Spent on this Encounter[KR1.2]   Cindy MENDOZA, personally saw the patient today and spent 40 minutes discharging this patient.[KR1.1]    Discharge Orders     General info for SNF   Length of Stay Estimate: Short Term Care: Estimated # of Days <30  Condition at Discharge: Improving  Level of care:skilled   Rehabilitation Potential: Fair  Admission H&P remains valid and up-to-date: Yes  Recent Chemotherapy: N/A  Use Nursing Home Standing Orders: Yes     Mantoux instructions   Give two-step Mantoux (PPD) Per Facility Policy Yes     Glucose monitor nursing POCT   Before meals and at bedtime     Intake and output   Every shift     Follow Up and recommended labs and tests   Follow up with Nursing home physician.  No follow up labs or test are needed.  Recommend follow up Chest X-ray in 6 weeks.     Activity - Up with nursing assistance     Activity - Up with assistive device     DNR/DNI     Physical Therapy Adult Consult   Evaluate and treat as clinically indicated.    Reason:  deconditioning     Occupational Therapy Adult Consult   Evaluate and treat as clinically indicated.    Reason:  deconditioning     Fall precautions     Advance Diet as Tolerated   Follow this diet upon discharge: Orders Placed This Encounter     Dysphagia Diet Level 3 Advanced Thin Liquids (water, ice chips, juice, milk gelatin, ice cream, etc)       Discharge Medications   Current Discharge Medication List      START taking these medications    Details   metoprolol succinate (TOPROL-XL) 50 MG 24 hr tablet Take 1 tablet (50 mg) by mouth daily  Qty: 30 tablet    Associated Diagnoses: Atrial fibrillation with rapid ventricular response (H)      guaiFENesin-dextromethorphan (ROBITUSSIN DM) 100-10 MG/5ML  syrup Take 5 mLs by mouth every 6 hours as needed for cough or congestion  Qty: 560 mL    Associated Diagnoses: Cough      levofloxacin (LEVAQUIN) 500 MG tablet Take 1 tablet (500 mg) by mouth daily for 7 days  Qty: 7 tablet, Refills: 0    Associated Diagnoses: Pneumonia of right lung due to infectious organism, unspecified part of lung         CONTINUE these medications which have CHANGED    Details   LORazepam (ATIVAN) 0.5 MG tablet Take 1 tablet (0.5 mg) by mouth every 4 hours as needed (Anxiety, Nausea/Vomiting or Sleep)  Qty: 4 tablet, Refills: 5    Associated Diagnoses: Multiple myeloma not having achieved remission (H)      insulin detemir (LEVEMIR FLEXPEN/FLEXTOUCH) 100 UNIT/ML injection Inject 10 Units Subcutaneous every morning (before breakfast)  Qty: 15 mL, Refills: 1    Associated Diagnoses: Type 2 diabetes mellitus with diabetic chronic kidney disease, unspecified CKD stage, unspecified long term insulin use status (H); Uncontrolled type 2 diabetes mellitus with hyperglycemia, unspecified long term insulin use status (H); Type 2 diabetes mellitus with stage 3 chronic kidney disease, with long-term current use of insulin (H)      amLODIPine (NORVASC) 10 MG tablet Take 1 tablet (10 mg) by mouth daily  Qty: 30 tablet    Associated Diagnoses: Essential hypertension         CONTINUE these medications which have NOT CHANGED    Details   dexamethasone (DECADRON) 4 MG tablet Take 20 mg by mouth See Admin Instructions Take 20mg on day 1,2,8,9,15,16,22 & 23 before Carfilzomib dose.      !! INSULIN ASPART SC Inject 9 Units Subcutaneous daily (with dinner)      !! INSULIN ASPART SC Inject Subcutaneous 4 times daily Sliding scale:  140-175 1 unit  176-210 2 units  211-245 3 units  246-280 4 units  281-315 5 units  316-350 6 units  351-385 7 units  386-420 8 units  >420 9 units      METHOCARBAMOL PO Take 750 mg by mouth 3 times daily as needed for muscle spasms      prochlorperazine (COMPAZINE) 10 MG tablet Take 1  tablet (10 mg) by mouth every 6 hours as needed (Nausea/Vomiting)  Qty: 30 tablet, Refills: 5    Associated Diagnoses: Multiple myeloma not having achieved remission (H)      acyclovir (ZOVIRAX) 400 MG tablet Take 1 tablet (400 mg) by mouth 2 times daily Start 1 week prior to daratumumab and continue for 3 months after treatment is complete.  Qty: 60 tablet, Refills: 11    Associated Diagnoses: Multiple myeloma not having achieved remission (H)      omeprazole (PRILOSEC) 20 MG CR capsule TAKE 1 CAPSULE BY MOUTH EVERY DAY  Qty: 90 capsule, Refills: 3    Associated Diagnoses: Congenital hiatus hernia      levothyroxine (SYNTHROID/LEVOTHROID) 25 MCG tablet TAKE 1 TABLET BY MOUTH EVERY DAY  Qty: 90 tablet, Refills: 0    Associated Diagnoses: Acquired hypothyroidism      !! INSULIN ASPART SC Inject 13 Units Subcutaneous every morning       !! INSULIN ASPART SC Inject 11 Units Subcutaneous daily (before lunch)       !! INSULIN ASPART SC Inject Subcutaneous At Bedtime Inject 4 unit subcutaneously at bedtime for Diabetes II Ok to use Humalog insulin with same order details      VITAMIN D, CHOLECALCIFEROL, PO Take 1,000 Units by mouth      polyethylene glycol (MIRALAX/GLYCOLAX) Packet Take 17 g by mouth 2 times daily as needed (constipation)  Qty: 7 packet    Associated Diagnoses: Slow transit constipation      LISINOPRIL PO Take 15 mg by mouth daily      furosemide (LASIX) 20 MG tablet Take 1 tablet (20 mg) by mouth daily  Qty: 90 tablet, Refills: 1    Associated Diagnoses: Multiple myeloma not having achieved remission (H)      gemfibrozil (LOPID) 600 MG tablet TAKE 1 TABLET BY MOUTH TWICE DAILY  Qty: 180 tablet, Refills: 1    Associated Diagnoses: Hyperlipidemia      Ascorbic Acid (VITAMIN C PO) Take 500 mg by mouth daily       ferrous sulfate (IRON) 325 (65 FE) MG tablet Take 325 mg by mouth daily (with breakfast)      Selenium 200 MCG TABS Take 100 mcg by mouth daily. Pt takes one half tab of the 200 mcg daily       !!  FREESTYLE LITE test strip USE TO TEST FOUR TIMES DAILY  Qty: 400 strip, Refills: 0    Associated Diagnoses: Uncontrolled type 1 diabetes mellitus with stage 3 chronic kidney disease (H)      Blood Glucose Monitoring Suppl MISC 3 times daily (before meals)       !! B-D U/F 31G X 8 MM insulin pen needle USE 5 PEN NEEDLES PER DAY OR AS DIRECTED  Qty: 500 each, Refills: 1    Associated Diagnoses: Type 2 diabetes mellitus with stage 3 chronic kidney disease, with long-term current use of insulin (H)      !! FREESTYLE LITE test strip USE 1 STRIP TO TEST TWICE DAILY.  Qty: 200 strip, Refills: 1    Associated Diagnoses: Uncontrolled type 1 diabetes mellitus with stage 3 chronic kidney disease (H)      !! B-D U/F 31G X 8 MM insulin pen needle        !! - Potential duplicate medications found. Please discuss with provider.      STOP taking these medications       GLIPIZIDE PO Comments:   Reason for Stopping:         metoprolol tartrate (LOPRESSOR) 25 MG tablet Comments:   Reason for Stopping:             Allergies   No Known Allergies  Data[KR1.2]   Most Recent 3 CBC's:[KR1.1]  Recent Labs   Lab Test  03/27/18   0545  03/26/18   0607  03/25/18   0545   03/24/18   0600   WBC  3.5*  3.7*  4.7   --   4.6   HGB  10.0*  9.6*  9.8*   < >  7.6*   MCV  107*  107*   --    --   113*   PLT  96*  94*  82*   --   72*    < > = values in this interval not displayed.[KR1.2]      Most Recent 3 BMP's:[KR1.1]  Recent Labs   Lab Test  03/27/18   0545  03/26/18   0607  03/23/18   0532   NA  139  141  135   POTASSIUM  3.5  3.6  4.2   CHLORIDE  106  108  106   CO2  24  25  20   BUN  18  22  36*   CR  0.90  0.91  1.06   ANIONGAP  9  8  9   MICHELLE  8.5  8.7  8.0*   GLC  83  134*  446*[KR1.2]     Most Recent 2 LFT's:[KR1.1]  Recent Labs   Lab Test  03/22/18   1335  03/07/18   1130   AST  16  15   ALT  15  14   ALKPHOS  86  99   BILITOTAL  0.3  0.5[KR1.2]     Most Recent INR's and Anticoagulation Dosing History:  Anticoagulation Dose History     Recent  Dosing and Labs Latest Ref Rng & Units 11/16/2016 11/17/2016 11/18/2016 11/19/2016 11/20/2016 2/13/2017 1/20/2018    INR 0.86 - 1.14 1.31(H) 1.54(H) 1.57(H) 1.18(H) 1.46(H) 1.15(H) 1.09    INR 0.86 - 1.14 - - - - - - -        Most Recent 3 Troponin's:[KR1.1]  Recent Labs   Lab Test  03/22/18   1335  01/20/18   1443  01/20/18   1045   TROPI  <0.015  0.016  0.020[KR1.2]     Most Recent Cholesterol Panel:[KR1.1]  Recent Labs   Lab Test  05/23/17   1014   CHOL  123   LDL  64   HDL  35*   TRIG  120[KR1.2]     Most Recent 6 Bacteria Isolates From Any Culture (See EPIC Reports for Culture Details):[KR1.1]  Recent Labs   Lab Test  01/20/18   0439  01/20/18   0424  01/20/18   0314  12/13/17   1049  12/01/17   1600  10/24/17   0929   CULT  No growth  No growth  >100,000 colonies/mL  Escherichia coli ESBL  *  >100,000 colonies/mL  Strain 2  Escherichia coli ESBL  *  Enterobacteriaceae that are susceptible to meropenem are usually susceptible to ertapenem.  ESBL (extended beta lactamase) producing organisms require contact precautions.  >100,000 colonies/mL  Escherichia coli ESBL  ESBL (extended beta lactamase) producing organisms require contact precautions.  *  50,000 to 100,000 colonies/mL  Escherichia coli ESBL  *  10,000 to 50,000 colonies/mL  Escherichia coli ESBL  *  <10,000 colonies/mL  urogenital janneth    ESBL (extended beta lactamase) producing organisms require contact precautions.  >100,000 colonies/mL  Escherichia coli  *[KR1.2]     Most Recent TSH, T4 and A1c Labs:[KR1.1]  Recent Labs   Lab Test  03/23/18   0532  03/22/18   1335   09/28/16   1439   TSH  1.78   --    --   5.53*   T4   --    --    --   0.83   A1C   --   5.5   < >  8.8*    < > = values in this interval not displayed.[KR1.2]     Results for orders placed or performed during the hospital encounter of 03/22/18   XR Chest 2 Views    Narrative    CHEST TWO VIEWS   3/25/2018 12:21 PM     HISTORY: Acute respiratory failure with right basal crackles,  rule out  aspiration pneumonia.     COMPARISON: 1/20/2018.      Impression    IMPRESSION: New right basilar consolidation with right pleural  effusion concerning for pneumonia. There is also small left pleural  effusion and mild left basilar opacity. No pneumothorax visualized.  Cardiac silhouette is unchanged. There are atherosclerotic  calcifications of the aortic arch.    ANGELIQUE CANCINO MD[KR1.4]        Revision History        User Key Date/Time User Provider Type Action    > KR1.2 3/27/2018 11:24 AM Cindy Kitchen MD Physician Sign     KR1.3 3/27/2018 11:19 AM Cindy Kitchen MD Physician      KR1.4 3/27/2018 11:11 AM Cindy Kitchen MD Physician      KR1.1 3/27/2018 11:09 AM Cindy Kitchen MD Physician                      Consult Notes      Consults by Rosalva Heath MD at 3/23/2018  1:20 PM     Author:  Rosalva Heath MD Service:  Hem/Onc Author Type:  Physician    Filed:  3/23/2018  1:21 PM Date of Service:  3/23/2018  1:20 PM Creation Time:  3/23/2018  1:03 PM    Status:  Addendum :  Rosalva Heath MD (Physician)     Consult Orders:    1. Hematology & Oncology IP Consult: MM, undergoing chemo.  Dr. Quinn's patient; Consultant may enter orders: Yes; Patient to be seen: Routine - within 24 hours; Requested Clinic/Group: [facility] [[id number]] ordered by Manjeet Dawson MD at 03/22/18 1719                [facility]    Hematology/Oncology Consult Note      Date of Admission:  3/22/2018  Date of Consult:  03/23/18  Reason for Consult: multiple myeloma      ASSESSMENT/PLAN:  Roc Parkinson is a 89 year old male with:    1) Atrial fibrillation: s/p IV diltiazem and diltiazem drip.  He has converted to sinus rhythm.  He is being switched to metoprolol today.  He is not on anticoagluation due to history of lower GI bleed.    2) Multiple myeloma: he started on daratumumab and dexamethasone on 3/22/18.    -holding treatment  for now due to atrial fibrillation  -he will follow-up with Dr. Quinn after discharge to discuss treatment plan; he will be rounding tomorrow    3) DMII: Glucoses have been running higher due to high doses of steroids he has received.  -on insulin, as per hospitalist service    4) Anemia and thrombocytopenia: Hemoglobin of 8.6 and platelet count of 91K.  He has history of MDS and gets Aranesp 300 mcg SQ every 3 weeks.  -monitor CBC[KC1.1]  -transfuse for hemoglobin <8[KC1.2]        HISTORY OF PRESENT ILLNESS: Roc Parkinson is a 89 year old male who presents with atrial fibrillation.  He is a patient of Dr. Quinn.  He has history of kappa light chain IgM multiple myeloma, as well as background of myelodysplastic syndrome.  He also has history of prostate cancer s/p EBRT s/p brachytherapy in 2003, and superficial bladder cancer, no recurrence since 1986.  He has had treatment history of Velcade/dex, cyclophosphamide added later.  He was switched to carfilzomib and dexamethasone on 1/2/18.  He then had clear progression of the myeloma in March 2018.  Plans were made to start daratumumab and dexamethasone.  He received the first dose yesterday.  During the infusion of daratumumab yesterday, he developed shortness of breath, cough, and heart palpitations.  He was pre-medicated prior to the chemo with Tylenol, Benadryl, and dexamethasone.  He was then given prn medications of Solu-medrol, epi, and Demerol.  EKG showed atrial fibrillation with heart rate in 140-150's, sat's 97-98% on 2L.  911 was called, and patient was transported to the ED.  He has history of atrial fibrillation in the past.  He received IV diltiazem and placed on diltiazem drip.  He has converted to sinus rhythm.  He is being switched to metoprolol today.     Today, Roc says that he is feeling better.  He still gets short of breath when he goes to the bathroom, but he no longer feels palpitations.  His glucose has been running high due to  steroids.      REVIEW OF SYSTEMS:   14 point ROS was reviewed and is negative other than as noted above in HPI.       MEDICATIONS:  Current Facility-Administered Medications   Medication     sodium chloride (PF) 0.9% PF flush 3 mL     sodium chloride (PF) 0.9% PF flush 3 mL     [START ON 3/24/2018] insulin detemir (LEVEMIR) injection 20 Units     insulin aspart (NovoLOG) inj (RAPID ACTING)     insulin aspart (NovoLOG) inj (RAPID ACTING)     [START ON 3/24/2018] metoprolol succinate (TOPROL-XL) 24 hr tablet 50 mg     metoprolol succinate (TOPROL-XL) 24 hr tablet 25 mg     diltiazem (CARDIZEM) 125 mg in sodium chloride 0.9 % 125 mL infusion     acyclovir (ZOVIRAX) tablet 400 mg     amLODIPine (NORVASC) tablet 5 mg     ascorbic acid (VITAMIN C) tablet 1,000 mg     dexamethasone (DECADRON) tablet 4 mg     ferrous sulfate (IRON) tablet 325 mg     gemfibrozil (LOPID) tablet 600 mg     levothyroxine (SYNTHROID/LEVOTHROID) tablet 25 mcg     LORazepam (ATIVAN) tablet 0.5 mg     omeprazole (priLOSEC) CR capsule 20 mg     polyethylene glycol (MIRALAX/GLYCOLAX) Packet 17 g     prochlorperazine (COMPAZINE) tablet 5 mg     cholecalciferol (vitamin D3) tablet 1,000 Units     naloxone (NARCAN) injection 0.1-0.4 mg     melatonin tablet 1 mg     Patient is already receiving anticoagulation with heparin, enoxaparin (LOVENOX), warfarin (COUMADIN)  or other anticoagulant medication     potassium chloride SA (K-DUR/KLOR-CON M) CR tablet 20-40 mEq     potassium chloride (KLOR-CON) Packet 20-40 mEq     potassium chloride 10 mEq in 100 mL sterile water intermittent infusion (premix)     potassium chloride 10 mEq in 100 mL intermittent infusion with 10 mg lidocaine     potassium chloride 20 mEq in 50 mL intermittent infusion     magnesium sulfate 4 g in 100 mL sterile water (premade)     glucose 40 % gel 15-30 g    Or     dextrose 50 % injection 25-50 mL    Or     glucagon injection 1 mg         ALLERGIES:  No Known Allergies      PAST  MEDICAL HISTORY:  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         PAST SURGICAL HISTORY:  Past Surgical History:   Procedure Laterality Date     ARTHROPLASTY KNEE  11/5/2012    Procedure: ARTHROPLASTY KNEE;  RIGHT TOTAL KNEE ARTHROPLASTY (SMITH & NEPHEW)^;  Surgeon: Dickson Schulte MD;  Location:  OR     BIOPSY      bladder     COLONOSCOPY  2007     COLONOSCOPY N/A 11/15/2016    Procedure: COMBINED COLONOSCOPY, SINGLE OR MULTIPLE BIOPSY/POLYPECTOMY BY BIOPSY;  Surgeon: Kenneth Fulton MD;  Location:  GI     GENITOURINARY SURGERY      TURP x2 and bladder scraping     GI SURGERY      anal fistula     ORTHOPEDIC SURGERY      lt knee in nov.2009     PHACOEMULSIFICATION CLEAR CORNEA WITH STANDARD INTRAOCULAR LENS IMPLANT Left 10/25/2017    Procedure: PHACOEMULSIFICATION CLEAR CORNEA WITH STANDARD INTRAOCULAR LENS IMPLANT;  LEFT EYE PHACOEMULSIFICATION CLEAR CORNEA WITH STANDARD INTRAOCULAR LENS IMPLANT ;  Surgeon: Shady Haider MD;  Location:  EC     PHACOEMULSIFICATION CLEAR CORNEA WITH STANDARD INTRAOCULAR LENS IMPLANT Right 11/1/2017    Procedure: PHACOEMULSIFICATION CLEAR CORNEA WITH STANDARD INTRAOCULAR LENS IMPLANT;  RIGHT EYE PHACOEMULSIFICATION CLEAR CORNEA WITH STANDARD INTRAOCULAR LENS IMPLANT;  Surgeon: Shady Haider MD;  Location:  EC     SOFT TISSUE SURGERY      melanoma         SOCIAL HISTORY:  Social History     Social History     Marital status:      Spouse name: N/A     Number of children: N/A     Years of education: N/A     Occupational History     Not on file.     Social History Main Topics     Smoking status: Never Smoker     Smokeless tobacco: Never Used     Alcohol use No     Drug use: No     Sexual activity: No     Other Topics Concern  "    Parent/Sibling W/ Cabg, Mi Or Angioplasty Before 65f 55m? No     Social History Narrative         FAMILY HISTORY:  Family History   Problem Relation Age of Onset     Cardiovascular Father 81     heart arrythmia     Endocrine Disease Brother 74     Paget's         PHYSICAL EXAM:  Vital signs:  Temp: 95.7  F (35.4  C) Temp src: Oral BP: 134/82 Pulse: 99 Heart Rate: 76 Resp: 18 SpO2: 91 % O2 Device: None (Room air) Oxygen Delivery: 1 LPM Height: 177.8 cm (5' 10\") Weight: 95.4 kg (210 lb 6.4 oz)  Estimated body mass index is 30.19 kg/(m^2) as calculated from the following:    Height as of this encounter: 1.778 m (5' 10\").    Weight as of this encounter: 95.4 kg (210 lb 6.4 oz).    GENERAL/CONSTITUTIONAL: No acute distress. Son at bedside.  EYES: No scleral icterus.  RESPIRATORY: Clear to auscultation bilaterally. No crackles or wheezing.   CARDIOVASCULAR: Regular rate and rhythm without murmurs, gallops, or rubs.  GASTROINTESTINAL: No tenderness. The patient has normal bowel sounds. No guarding.  MUSCULOSKELETAL: Warm and well-perfused.  NEUROLOGIC: Alert, oriented, answers questions appropriately.  INTEGUMENTARY: No jaundice.      LABS:  CBC RESULTS:   Recent Labs   Lab Test  03/22/18   1335   WBC  5.6   RBC  2.41*   HGB  8.6*   HCT  27.6*   MCV  115*   MCH  35.7*   MCHC  31.2*   RDW  19.6*   PLT  91*       Recent Labs   Lab Test  03/23/18   0532  03/22/18   1335   NA  135  140   POTASSIUM  4.2  3.5   CHLORIDE  106  108   CO2  20  23   ANIONGAP  9  9   GLC  446*  168*   BUN  36*  24   CR  1.06  0.95   MICHELLE  8.0*  8.5             Thank you for the opportunity to participate in this patient's care.  Please call with any questions.    Rosalva Heath MD  Hematology/Oncology  [facility][KC1.1]       Revision History        User Key Date/Time User Provider Type Action    > KC1.2 3/23/2018  1:21 PM Rosalva Heath MD Physician Addend     KC1.1 3/23/2018  1:20 PM Rosalva Heath, " MD Physician Sign                     Progress Notes - Physician (Notes from 03/24/18 through 03/27/18)      Progress Notes by Pritesh Villaseñor MD at 3/26/2018 10:20 PM     Author:  Pritesh Villaseñor MD Service:  Hem/Onc Author Type:  Physician    Filed:  3/26/2018 10:27 PM Date of Service:  3/26/2018 10:20 PM Creation Time:  3/26/2018 10:20 PM    Status:  Signed :  Pritesh Villaseñor MD (Physician)         Service Date: 03/26/2018       SUBJECTIVE:    Mr. Parkinson is a 89-year-old gentleman with IgM kappa multiple myeloma.  He also has myelodysplastic syndrome.  He is currently on treatment for multiple myeloma.       For myeloma, patient has received multiple different treatment.  Because of progression, he was started on Velcade, daratumumab and dexamethasone on 03/22/2018.  During daratumumab infusion on 03/23/2018, patient had reaction.  He developed shortness of breath, cough and palpitation.  EKG revealed atrial fibrillation with rapid ventricular rate.  He was evaluated in Emergency Room and admitted.       I saw him in the cardiac unit. Family was by the bedside.  Patient said that he was feeling a little better.  He was not having palpitations.  His shortness of breath was better.  He still felt weak.  No chest pain.       No headache.  Some dizziness.  No vomiting.  No bleeding.       I spoke to the nurse.  As per him also, patient's overall condition is better.       PHYSICAL EXAMINATION:   GENERAL:  He was alert and oriented x 3.     VITALS:  Reviewed.     Rest of the system not examined.       LABORATORY DATA:  Reviewed.       ASSESSMENT:   1.  A 89-year-old gentleman with multiple myeloma admitted with new onset atrial fibrillation.  Atrial fibrillation could be related to chemotherapy.   2.  Pancytopenia from myeloma and chemotherapy.   3.  Right lung pneumonia.       PLAN:   1.  Discussed with him regarding myeloma.  He started a new regimen of daratumumab, Velcade and dexamethasone on 03/22/2018.   Dr. Quinn will decide as an outpatient whether to continue him on that regimen or change it.   2.  He is pancytopenic.  CBC will be monitored and transfusion given as needed. Neupogen will be given as needed.   3.  Patient is on acyclovir, which should be continued. It is prophylaxis for herpes zoster while on chemotherapy for myeloma.     4.  He has pneumonia.  He is on antibiotics.  He is afebrile.   5.  Patient and family had a few questions, which were all answered.  We will continue to follow him.       Total time spent 25 minutes, more than 50% of the time was spent in counseling and coordination of care.[BK1.1]      Revision History        User Key Date/Time User Provider Type Action    > BK1.1 3/26/2018 10:27 PM Pritesh Villaseñor MD Physician Sign            Progress Notes by Micky Moya MD at 3/26/2018 12:25 PM     Author:  Micky Moya MD Service:  Hospitalist Author Type:  Physician    Filed:  3/26/2018  4:51 PM Date of Service:  3/26/2018 12:25 PM Creation Time:  3/26/2018 12:25 PM    Status:  Signed :  Micky Moya MD (Physician)         [facility]    Hospitalist Progress Note  Micky Moya MD    Assessment & Plan     89-year-old gentleman, with PmHx of multiple myeloma initially diagnosed in 11/2016, MDS, prostate carcinoma, bladder carcinoma, paroxysmal atrial fibrillation, Hypertension, Hyperlipidemia, pancytopenia due to multiple myeloma and chemotherapy, who was admitted on 3/22/18 from the Oncology Clinic, due to symptoms of cough, shortness of breath, palpitations and tremors, 1.5hrs into receiving[AT1.1] his[AT1.2] 1st cycle of daratumumab/dexamethasone. He was found to be in AFIB rate 140s-150s and sent to the ER.    EKG on 3/22/18 showed, atrial fibrillation with ventricular rate of 145. Work up done on 3/22/18 revealed, CMP significant for Alb 2.7. CBC revealed, Hb 8.6, WBC 5.6 and Plts 91. Troponin was <0.015,  lactic acid 2.2.        1.   S/P Paroxysmal Atrial fibrillation with rapid ventricular response: now in NSR, with HR in the 60s and 70s. S/P IV diltiazem 10mg x 2 doses and IV diltiazem drip. Continue toprol XL 50mg qd.The pt is not on anticoagulation due to history of lower gastrointestinal bleed.  ECHO on 01/11/2018 showed, LVEF 65%, no regional wall motion abnormalities, RV was normal, no hemodynamically significant valvular abnormalities. TSH on 3/23/18 was normal.      2.  Possible healthcare associated bibasal pneumonia R>L, with right pleural effusion and hypoxic respiratory failure: the pt is immunocompromised with underlying active myeloma and recent chemotherapy.[AT1.1] Discontinued IV Vancomycin on 3/26/18. Continue[AT1.2] IV Zosyn[AT1.1] and[AT1.2] IV levaquin[AT1.1].[AT1.2] For Xopenex nebs prn.  Continue[AT1.1] 2[AT1.2]L O2 via NC. For Robitussin DM prn. WBC was 4.7[AT1.1]-->3.7[AT1.2] on 3/2[AT1.1]6[AT1.2]/18.[AT1.1] Appreciate[AT1.2] speech therapist[AT1.1] input. No aspiration seen. Continue dysphagia type 3 diet with thin liquids.     3.  Mild congetive heart failure: BNP was 7826 on 3/25/18. D-dimer on 3/25/18 was normal. S/P IV lasix 40mg x 1 dose and patient diuresed 3,150mls on 3/25/18. Net input/output in the last 24hrs was -880mls. ECHO on 3/26/18 revealed, LVEF 55-60%, LV and RV size are normal. LA is mildly dilated.   RA is normal. No regional wall motion abnormalities were noted.     4[AT1.2].   Multiple myeloma:  Appreciate Oncology input. pt was commenced on daratumumab/dexamethasone on 3/22/18,  due to progression of his myeloma.[AT1.1] Continue acyclovir prophylaxis for herpes zoster.[AT1.2] For outpt Oncology follow up.    [AT1.1]  5[AT1.2].   Hypertension: SBP is ranging 140s-->1[AT1.1]5[AT1.2]0s.[AT1.1] Continue[AT1.2] amlodipin[AT1.1]e[AT1.2] 10mg qd[AT1.1] and[AT1.2] toprol 50mg XL.[AT1.1]    6[AT1.2].   Type 2, Diabetes mellitus: BG[AT1.1] was 116 this am[AT1.2]. Pt is on oral  dexamethasone qd. HbA1C was 5.5% on 3/22/18.[AT1.1] Continue[AT1.2] Levemir 22u qa[AT1.1]m[AT1.2]. Continue high dose insulin aspart sliding scale prn.    [AT1.1]  7[AT1.2].   Chemotherapy induced pancytopenia: CBC revealed, Hb 8.6-->7.6-->9.[AT1.1]6[AT1.2], WBC 5.6-->4.6-->[AT1.1]3.7[AT1.2] and Plts 91-->72[AT1.1]-->94[AT1.2] on 3/2[AT1.1]6[AT1.2]/18. S/P transfusion of 1u of PRBCs on 3/24/18[AT1.1]. Monitor CBC.    8.   Hypothyroidism: continue levothyroxine.     9.   GERD Prophylaxis: continue omeprazole.[AT1.2]        DVT Prophylaxis:[AT1.1] Lovenox.[AT1.2]  Code Status: DNR/DNI    Disposition: Expected discharge in[AT1.1] 1-2[AT1.2] days. For PT/OT/Speech therapist reviews.       Interval History   The pt was dyspneic on exertion such as speaking and ambulating yesterday evening. His O2 Sats dropped to 88% on RA at 1am this morning  and he was placed on 3L O2 via NC. He is afebrile. According to his nurse he was noted to cough with eating and drinking. The patient stated that his coughing with his meals is a chronic problem, but this has gotten worse in the last 2 days.     -Data reviewed today: I reviewed all new labs and imaging results over the last 24 hours. I personally reviewed no images or EKG's today.    Physical Exam   Temp: 97.8  F (36.6  C) Temp src: Oral BP: 156/81   Heart Rate: 70 Resp: 18 SpO2: 97 % O2 Device: Nasal cannula Oxygen Delivery: 2 LPM  Vitals:    03/23/18 0400 03/24/18 0640 03/26/18 0700   Weight: 95.4 kg (210 lb 6.4 oz) 95.1 kg (209 lb 9.6 oz) 91.7 kg (202 lb 3.2 oz)     Vital Signs with Ranges  Temp:  [96.4  F (35.8  C)-97.8  F (36.6  C)] 97.8  F (36.6  C)  Heart Rate:  [65-70] 70  Resp:  [18-20] 18  BP: (145-156)/(69-81) 156/81  SpO2:  [95 %-98 %] 97 %  I/O last 3 completed shifts:  In: 1400 [P.O.:700; I.V.:700]  Out: 3150 [Urine:3150]    Constitutional: Elderly white male, awake, cooperative, no apparent distress, O2 Sats 93% on RA  Respiratory: BS vesicular bilaterally, no  crackles or wheezing  Cardiovascular: S1 and S2, reg, no murmur noted  GI: Soft, non-distended, non-tender, no masses, BS present+  Skin/Integumen: No rashes  Extremities: Mild bilateral pedal edema    Medications     - MEDICATION INSTRUCTIONS -         levofloxacin  500 mg Intravenous Q24H     piperacillin-tazobactam  4.5 g Intravenous Q6H     amLODIPine  10 mg Oral Daily     insulin detemir  22 Units Subcutaneous QAM AC     vancomycin (VANCOCIN) IV  1,750 mg Intravenous Q24H     enoxaparin  40 mg Subcutaneous Q24H     sodium chloride (PF)  3 mL Intracatheter Q8H     insulin aspart  1-10 Units Subcutaneous TID AC     insulin aspart  1-7 Units Subcutaneous At Bedtime     metoprolol succinate  50 mg Oral Daily     acyclovir  400 mg Oral BID     ascorbic acid (VITAMIN C) tablet 1,000 mg  1,000 mg Oral Daily     dexamethasone  4 mg Oral Daily with breakfast     ferrous sulfate  325 mg Oral Daily with breakfast     gemfibrozil  600 mg Oral BID     levothyroxine  25 mcg Oral Daily     omeprazole  20 mg Oral QAM     cholecalciferol  1,000 Units Oral Daily       Data     Recent Labs  Lab 03/26/18  0607 03/25/18  0545 03/24/18  1656 03/24/18  0600  03/23/18  0532 03/22/18  1335   WBC 3.7* 4.7  --  4.6  --   --  5.6   HGB 9.6* 9.8* 9.8* 7.6*  < >  --  8.6*   *  --   --  113*  --   --  115*   PLT 94* 82*  --  72*  --   --  91*     --   --   --   --  135 140   POTASSIUM 3.6  --   --   --   --  4.2 3.5   CHLORIDE 108  --   --   --   --  106 108   CO2 25  --   --   --   --  20 23   BUN 22  --   --   --   --  36* 24   CR 0.91  --   --   --   --  1.06 0.95   ANIONGAP 8  --   --   --   --  9 9   MICHELLE 8.7  --   --   --   --  8.0* 8.5   *  --   --   --   --  446* 168*   ALBUMIN  --   --   --   --   --   --  2.7*   PROTTOTAL  --   --   --   --   --   --  7.8   BILITOTAL  --   --   --   --   --   --  0.3   ALKPHOS  --   --   --   --   --   --  86   ALT  --   --   --   --   --   --  15   AST  --   --   --   --   --    --  16   TROPI  --   --   --   --   --   --  <0.015   < > = values in this interval not displayed.    No results found for this or any previous visit (from the past 24 hour(s)).[AT1.1]       Revision History        User Key Date/Time User Provider Type Action    > AT1.2 3/26/2018  4:51 PM Micky Moya MD Physician Sign     AT1.1 3/26/2018 12:25 PM Micky Moya MD Physician             Progress Notes by Tico Mcdonnell OT at 3/26/2018  4:27 PM     Author:  Tico Mcdonnell OT Service:  (none) Author Type:  Occupational Therapist    Filed:  3/26/2018  4:27 PM Date of Service:  3/26/2018  4:27 PM Creation Time:  3/26/2018  4:27 PM    Status:  Signed :  Tico Mcdonnell OT (Occupational Therapist)          03/26/18 1500   Quick Adds   Type of Visit Initial Occupational Therapy Evaluation   Living Environment   Lives With alone   Living Arrangements independent living facility   Home Accessibility no concerns   Transportation Available car   Self-Care   Dominant Hand right   Usual Activity Tolerance good   Current Activity Tolerance fair   Equipment Currently Used at Home walker, rolling;grab bar   Functional Level Prior   Ambulation 1-->assistive equipment   Transferring 1-->assistive equipment   Toileting 0-->independent  (comfort height)   Bathing 1-->assistive equipment   Dressing 0-->independent   Eating 0-->independent   Communication 0-->understands/communicates without difficulty   Swallowing 0-->swallows foods/liquids without difficulty   Cognition 0 - no cognition issues reported   Fall history within last six months yes   Number of times patient has fallen within last six months 2   General Information   Onset of Illness/Injury or Date of Surgery - Date 03/22/18   Referring Physician Micky Moya MD   Patient/Family Goals Statement TCU, but prefer home   Additional Occupational Profile Info/Pertinent History of Current Problem Pt admitted for A fib with RVR and  multiple myeloma.   Precautions/Limitations fall precautions;oxygen therapy device and L/min  (Pt on 2 L O2 at eval; no supplemental O2 at baseline)   Cognitive Status Examination   Orientation orientation to person, place and time   Level of Consciousness alert   Able to Follow Commands WNL/WFL   Personal Safety (Cognitive) WNL/WFL   Memory (will continue to monitor and assess as needed)   Visual Perception   Visual Perception Wears glasses   Pain Assessment   Patient Currently in Pain No   Range of Motion (ROM)   ROM Comment B UE WFL   Strength   Strength Comments B UE 5/5 MMT   Mobility   Bed Mobility Bed mobility skill: Rolling/Turning;Bed mobility skill: Scooting/Bridging;Bed mobility skill: Sit to supine;Bed mobility skill: Supine to sit;Bed mobility analysis   Bed Mobility Skill: Rolling/Turning   Level of [place] - Bed Mobility Skill Rolling Turning stand-by assist   Bed Mobility Skill: Scooting/Bridging   Level of [place]: Scooting/Bridging stand-by assist   Bed Mobility Skill: Sit to Supine   Level of [place]: Sit/Supine stand-by assist   Bed Mobility Skill: Supine to Sit   Level of [place]: Supine/Sit stand-by assist   Bed Mobility Analysis   Impairments Contributing to Impaired Bed Mobility impaired balance   Transfer Skills   Transfer Transfer Safety Analysis Bed/Chair;Transfer Skill: Stand to Sit;Transfer Safety Analysis Sit/Stand   Transfer Skill: Bed to Chair/Chair to Bed   Level of [place]: Bed to Chair stand-by assist   Assistive Device - Transfer Skill Bed to Chair Chair to Bed Rehab Eval standard walker   Transfer Safety Analysis Bed/Chair   Impairments Contributing to Impaired Transfers impaired balance   Transfer Skill: Sit to Stand   Level of [place]: Sit/Stand stand-by assist   Assistive Device for Transfer: Sit/Stand standard walker   Transfer Safety Analysis Sit/Stand   Impaired Transfers: Sit/Stand impaired balance   Toilet Transfer   Toilet Transfer Toilet  "Transfer Skill;Toilet Transfer Safety Analysis   Transfer Skill: Toilet Transfer   Level of [place]: Toilet stand-by assist   Assistive Device standard walker   Transfer Safety Analysis Toilet   Transfer Safety Analysis Toilet impaired balance   Upper Body Dressing   Level of [place]: Dress Upper Body stand-by assist   Lower Body Dressing   Level of [place]: Dress Lower Body minimum assist (75% patients effort)   Toileting   Level of [place]: Toilet stand-by assist   Grooming   Level of [place]: Grooming stand-by assist   Instrumental Activities of Daily Living (IADL)   Previous Responsibilities meal prep;housekeeping;laundry;medication management;driving   IADL Comments Uses pillbox   Activities of Daily Living Analysis   Impairments Contributing to Impaired Activities of Daily Living balance impaired   General Therapy Interventions   Planned Therapy Interventions ADL retraining;transfer training   Clinical Impression   Criteria for Skilled Therapeutic Interventions Met yes, treatment indicated   OT Diagnosis Decreased I in ADLs/IADLs   Influenced by the following impairments Decreased balance, activity tolerance   Assessment of Occupational Performance 1-3 Performance Deficits   Identified Performance Deficits Bathing, dressing, IADLs   Clinical Decision Making (Complexity) Low complexity   Therapy Frequency daily   Predicted Duration of Therapy Intervention (days/wks) 3 days   Anticipated Discharge Disposition Transitional Care Facility   Risks and Benefits of Treatment have been explained. Yes   Patient, Family & other staff in agreement with plan of care Yes   [facility] [employer]-PAC TM \"6 Clicks\"   2016, Trustees of [facility], under license to [employer].  All rights reserved.   6 Clicks Short Forms Daily Activity Inpatient Short Form   [facility] AM-PAC  \"6 Clicks\" Daily Activity Inpatient Short Form   1. Putting on and taking off regular lower body clothing? 3 - A " Little   2. Bathing (including washing, rinsing, drying)? 3 - A Little   3. Toileting, which includes using toilet, bedpan or urinal? 3 - A Little   4. Putting on and taking off regular upper body clothing? 3 - A Little   5. Taking care of personal grooming such as brushing teeth? 4 - None   6. Eating meals? 4 - None   Daily Activity Raw Score (Score out of 24.Lower scores equate to lower levels of function) 20   Total Evaluation Time   Total Evaluation Time (Minutes) 5[MM1.1]        Revision History        User Key Date/Time User Provider Type Action    > MM1.1 3/26/2018  4:27 PM Tico Mcdonnell, OT Occupational Therapist Sign            Progress Notes by Agustina Cardoso, PT at 3/26/2018  1:24 PM     Author:  Agustina Cardoso PT Service:  (none) Author Type:  Physical Therapist    Filed:  3/26/2018  1:25 PM Date of Service:  3/26/2018  1:24 PM Creation Time:  3/26/2018  1:25 PM    Status:  Signed :  Agustina Cardoso, PT (Physical Therapist)          03/26/18 1300   Quick Adds   Type of Visit Initial PT Evaluation   Living Environment   Lives With alone   Living Arrangements apartment   Home Accessibility no concerns   Transportation Available car   Living Environment Comment Pt lives alone in  apt, no services available, pt drives.   Self-Care   Usual Activity Tolerance good   Current Activity Tolerance moderate   Regular Exercise no   Equipment Currently Used at Home walker, rolling   Functional Level Prior   Ambulation 1-->assistive equipment   Transferring 1-->assistive equipment   Toileting 1-->assistive equipment   Bathing 0-->independent   Dressing 0-->independent   Eating 0-->independent   Communication 0-->understands/communicates without difficulty   Swallowing 0-->swallows foods/liquids without difficulty   Cognition 0 - no cognition issues reported   Fall history within last six months yes   Number of times patient has fallen within last six months 2   Which of the above functional risks had  a recent onset or change? ambulation   Prior Functional Level Comment pt manages all ADL's - drives to grocery store, makes own meals, cleans apt, etc. No family in town or friends to assist.   General Information   Onset of Illness/Injury or Date of Surgery - Date 03/22/18   Referring Physician Dr. Moya   Patient/Family Goals Statement hopes to go home when ready   Pertinent History of Current Problem (include personal factors and/or comorbidities that impact the POC) Pt was admitted from oncology clinic with reaction to chemo - had A fib with RVR. Also now has pneumonia. PMH includes multiple myeloma, MDS, bladder CA, prostate CA, A fib (not on anti-coagulation d/t lower GI bleed 1 year ago).    Precautions/Limitations fall precautions   General Info Comments Pt has 3 sons, none live in state. Son Bam is visiting from [place] until Sunday.   Cognitive Status Examination   Orientation orientation to person, place and time   Level of Consciousness alert   Follows Commands and Answers Questions 100% of the time;able to follow multistep instructions   Personal Safety and Judgment intact   Memory intact   Cognitive Comment appears cognitively intact   Pain Assessment   Patient Currently in Pain No   Integumentary/Edema   Integumentary/Edema no deficits were identifed   Posture    Posture Not impaired   Range of Motion (ROM)   ROM Quick Adds No deficits were identified   Strength   Strength Comments LE strength grossly 4/5   Bed Mobility   Bed Mobility Comments supine to/from sit with SBA   Transfer Skills   Transfer Comments sit to/from stand with SBA   Gait   Gait Comments Gait 50' with walker and CGA. Vital signs monitored throughout session and remained stable. Pt on 2L O2 via NC, sats at rest 96%, post gait 94%. No SOB noted. /80 midway through session, HR 70's.   Balance   Balance Comments mildly unsteady, needs walker for upright balance. sitting balance IND.   Sensory Examination   Sensory Perception no  "deficits were identified   Coordination   Coordination no deficits were identified   Muscle Tone   Muscle Tone no deficits were identified   General Therapy Interventions   Planned Therapy Interventions balance training;gait training;strengthening;transfer training   Clinical Impression   Criteria for Skilled Therapeutic Intervention yes, treatment indicated   PT Diagnosis impaired functional mobility   Influenced by the following impairments generalized deconditioning, decreased balance   Functional limitations due to impairments decreased IND with all mobility   Clinical Presentation Stable/Uncomplicated   Clinical Presentation Rationale per clinical judgement   Clinical Decision Making (Complexity) Low complexity   Therapy Frequency` daily   Predicted Duration of Therapy Intervention (days/wks) 5 days   Anticipated Equipment Needs at Discharge (pt has walker)   Anticipated Discharge Disposition Home with Home Therapy;Transitional Care Facility  (pending progress and LOS)   Risk & Benefits of therapy have been explained Yes   Patient, Family & other staff in agreement with plan of care Yes   [facility] TM \"6 Clicks\"   2016, Trustees of [facility], under license to [employer].  All rights reserved.   6 Clicks Short Forms Basic Mobility Inpatient Short Form   [facility]  \"6 Clicks\" V.2 Basic Mobility Inpatient Short Form   1. Turning from your back to your side while in a flat bed without using bedrails? 4 - None   2. Moving from lying on your back to sitting on the side of a flat bed without using bedrails? 3 - A Little   3. Moving to and from a bed to a chair (including a wheelchair)? 3 - A Little   4. Standing up from a chair using your arms (e.g., wheelchair, or bedside chair)? 3 - A Little   5. To walk in hospital room? 3 - A Little   6. Climbing 3-5 steps with a railing? 2 - A Lot   Basic Mobility Raw Score (Score out of 24.Lower scores equate to lower levels of function) " 18   Total Evaluation Time   Total Evaluation Time (Minutes) 15[RB1.1]        Revision History        User Key Date/Time User Provider Type Action    > RB1.1 3/26/2018  1:25 PM Agustina Cardoso, PT Physical Therapist Sign            Progress Notes by Micky Moya MD at 3/25/2018 11:18 AM     Author:  Micky Moya MD Service:  Hospitalist Author Type:  Physician    Filed:  3/25/2018  4:00 PM Date of Service:  3/25/2018 11:18 AM Creation Time:  3/25/2018  1:18 PM    Status:  Addendum :  Micky Moya MD (Physician)         [facility]    Hospitalist Progress Note[AT1.1]  Micky Moya[AT1.2], MD[AT1.1]    Assessment & Plan[AT1.2]     89-year-old gentleman, with PmHx of multiple myeloma initially diagnosed in 11/2016, MDS, prostate carcinoma, bladder carcinoma, paroxysmal atrial fibrillation, Hypertension, Hyperlipidemia, pancytopenia due to multiple myeloma and chemotherapy, who was admitted on 3/22/18 from the Oncology Clinic, due to symptoms of cough, shortness of breath, palpitations and tremors, while receiving cycle 1 day 1 of daratumumab/dexamethasone  . He was found to be in AFIB rate 140s-150s.  EKG on 3/22/18 showed, atrial fibrillation with ventricular rate of 145. Work up done on 3/22/18 revealed, CMP significant for Alb 2.7. CBC revealed, Hb 8.6, WBC 5.6 and Plts 91. Troponin was <0.015, lactic acid 2.2.        1.[AT1.1]   S/P[AT1.3] Paroxysmal Atrial fibrillation with rapid ventricular response: now in NSR, with HR in the 60s and 70s. S/P IV diltiazem 10mg x 2 doses[AT1.1] and[AT1.4] IV diltiazem drip. Continue toprol XL 50mg qd.The pt is not on anticoagulation due to history of lower gastrointestinal bleed.  ECHO on 01/11/2018 showed, LVEF 65%, no regional wall motion abnormalities, RV was normal, no hemodynamically significant valvular abnormalities. TSH on 3/23/18 was normal.[AT1.1]      2.  Possible healthcare associated  bibasal pneumonia R>L, with right pleural effusion and hypoxic respiratory failure: the pt is immunocompromised with underlying active myeloma and recent chemotherapy. For IV Zosyn, IV levaquin and IV Vancoymcin. For Xopenex nebs prn.  Continue 3L O2 via NC.[AT1.4] For Robitussin DM prn.[AT1.5] WBC was 4.7 on 3/25/18.[AT1.4] For speech therapist review, to rule out[AT1.6] aspiration[AT1.5].[AT1.6] For D-Dimer and BNP check today, per pt's[AT1.5] Sister[AT1.6]-in-law - who is a Physician.[AT1.5]   [AT1.1]  3[AT1.4].   Multiple myeloma:  Appreciate Oncology input. pt was commenced on daratumumab/dexamethasone on 3/22/18,  due to progression of his myeloma. For outpt[AT1.1] Oncology[AT1.4] follow up.    [AT1.1]  4[AT1.4].   Hypertension:[AT1.1] SBP is ranging 140s-->160s. Will increase amlodipine from 5mg qd to 10mg qd.[AT1.4]  Continue toprol[AT1.1] 50mg[AT1.4] XL.[AT1.1]    5[AT1.4].   Type 2, Diabetes mellitus: BG is ranging 1[AT1.1][AT1.4] today. Pt is on oral dexamethasone qd. HbA1C was 5.5% on 3/22/18.[AT1.1] Increase[AT1.4] Levemir[AT1.1] to 22u qam[AT1.4] from 3/26/18[AT1.5].[AT1.1] Continue[AT1.4] high dose insulin aspart sliding scale prn.    [AT1.1]  6[AT1.4].   Chemotherapy induced[AT1.1] p[AT1.6]ancytopenia: CBC revealed, Hb 8.6-->7.6[AT1.1]-->9.8[AT1.4], WBC 5.6-->4.6[AT1.1]-->4.7[AT1.4] and Plts 91-->72 on 3/2[AT1.1]5[AT1.4]/18.[AT1.1] S/P[AT1.4] transfusion of 1u of PRBCs[AT1.1] on 3/2[AT1.4]4[AT1.7]/18[AT1.4]      Addendum at 3.56pm: BNP done on 3/25/18 was 7826. D- Dimer was 0.5. The patient has some component of CHF. Will give IV lasix 40mg x 1 and reassess input/output in the am. For ECHO in the am.[AT1.8]    DVT Prophylaxis: Ambulate every shift  Code Status:[AT1.1] DNR/DNI[AT1.2]    Disposition: Expected discharge in[AT1.1] 2-3[AT1.6] day[AT1.1]s[AT1.6].[AT1.1] For PT/OT/Speech therapist reviews.[AT1.6]       Interval History[AT1.2]   The pt[AT1.1] was dyspneic on exertion such as speaking  and ambulating yesterday evening. His O2 Sats dropped to 88% on RA at 1am this morning  and he was placed on 3L O2 via NC. He is afebrile. According to his nurse he was noted to cough with eating and drinking. The patient stated that his coughing with his meals is a chronic problem, but this has gotten worse in the last 2 days.[AT1.6]     -Data reviewed today: I reviewed all new labs and imaging results over the last 24 hours. I personally reviewed no images or EKG's today.[AT1.1]    Physical Exam   Temp: 98.1  F (36.7  C) Temp src: Oral BP: 158/74 Pulse: 66 Heart Rate: 73 Resp: 18 SpO2: 99 % O2 Device: Nasal cannula Oxygen Delivery: 3 LPM  Vitals:    03/22/18 1656 03/23/18 0400 03/24/18 0640   Weight: 94.2 kg (207 lb 10.8 oz) 95.4 kg (210 lb 6.4 oz) 95.1 kg (209 lb 9.6 oz)[AT1.2]     Vital Signs with Ranges[AT1.1]  Temp:  [96.3  F (35.7  C)-98.6  F (37  C)] 98.1  F (36.7  C)  Pulse:  [66-69] 66  Heart Rate:  [65-81] 73  Resp:  [16-18] 18  BP: (133-164)/(59-78) 158/74  SpO2:  [86 %-99 %] 99 %  I/O last 3 completed shifts:  In: 440 [P.O.:440]  Out: 425 [Urine:425][AT1.2]    Constitutional: Elderly white male, awake, cooperative, no apparent distress, O2 Sats 93% on RA  Respiratory: BS vesicular bilaterally, no crackles or wheezing  Cardiovascular: S1 and S2, reg, no murmur noted  GI: Soft, non-distended, non-tender, no masses, BS present+  Skin/Integumen: No rashes  Extremities:[AT1.1] Mild[AT1.6] bilateral pedal edema[AT1.1]    Medications     - MEDICATION INSTRUCTIONS -         piperacillin-tazobactam  3.375 g Intravenous Q6H     sodium chloride (PF)  3 mL Intracatheter Q8H     insulin detemir  20 Units Subcutaneous QAM AC     insulin aspart  1-10 Units Subcutaneous TID AC     insulin aspart  1-7 Units Subcutaneous At Bedtime     metoprolol succinate  50 mg Oral Daily     acyclovir  400 mg Oral BID     amLODIPine  5 mg Oral Daily     ascorbic acid (VITAMIN C) tablet 1,000 mg  1,000 mg Oral Daily     dexamethasone   4 mg Oral Daily with breakfast     ferrous sulfate  325 mg Oral Daily with breakfast     gemfibrozil  600 mg Oral BID     levothyroxine  25 mcg Oral Daily     omeprazole  20 mg Oral QAM     cholecalciferol  1,000 Units Oral Daily       Data     Recent Labs  Lab 03/25/18  0545 03/24/18  1656 03/24/18  0600  03/23/18  0532 03/22/18  1335 03/22/18  0840   WBC 4.7  --  4.6  --   --  5.6 3.6*   HGB 9.8* 9.8* 7.6*  < >  --  8.6* 7.7*   MCV  --   --  113*  --   --  115* 114*   PLT  --   --  72*  --   --  91* 178   NA  --   --   --   --  135 140  --    POTASSIUM  --   --   --   --  4.2 3.5  --    CHLORIDE  --   --   --   --  106 108  --    CO2  --   --   --   --  20 23  --    BUN  --   --   --   --  36* 24  --    CR  --   --   --   --  1.06 0.95  --    ANIONGAP  --   --   --   --  9 9  --    MICHELLE  --   --   --   --  8.0* 8.5  --    GLC  --   --   --   --  446* 168*  --    ALBUMIN  --   --   --   --   --  2.7*  --    PROTTOTAL  --   --   --   --   --  7.8  --    BILITOTAL  --   --   --   --   --  0.3  --    ALKPHOS  --   --   --   --   --  86  --    ALT  --   --   --   --   --  15  --    AST  --   --   --   --   --  16  --    TROPI  --   --   --   --   --  <0.015  --    < > = values in this interval not displayed.    Recent Results (from the past 24 hour(s))   XR Chest 2 Views    Narrative    CHEST TWO VIEWS   3/25/2018 12:21 PM     HISTORY: Acute respiratory failure with right basal crackles, rule out  aspiration pneumonia.     COMPARISON: 1/20/2018.      Impression    IMPRESSION: New right basilar consolidation with right pleural  effusion concerning for pneumonia. There is also small left pleural  effusion and mild left basilar opacity. No pneumothorax visualized.  Cardiac silhouette is unchanged. There are atherosclerotic  calcifications of the aortic arch.    ANGELIQUE CANCINO MD[AT1.2]          Revision History        User Key Date/Time User Provider Type Action    > AT1.8 3/25/2018  4:00 PM Micky Moya MD  Physician Addend     AT1.6 3/25/2018  1:48 PM Micky Moya MD Physician Sign     AT1.7 3/25/2018  1:40 PM Micky Moya MD Physician      AT1.5 3/25/2018  1:39 PM Micky Moya MD Physician      AT1.3 3/25/2018  1:38 PM Micky Moya MD Physician      AT1.4 3/25/2018  1:23 PM Micky Moya MD Physician      AT1.2 3/25/2018  1:19 PM Micky Moya MD Physician      AT1.1 3/25/2018  1:18 PM Micky Moya MD Physician             Progress Notes by Gretel Quinn MD at 3/25/2018  3:12 PM     Author:  Gretel Quinn MD Service:  Oncology Author Type:  Physician    Filed:  3/25/2018  3:15 PM Date of Service:  3/25/2018  3:12 PM Creation Time:  3/25/2018  3:12 PM    Status:  Signed :  Gretel Quinn MD (Physician)         [facility]  HEMATOLOGY ONCOLOGY    DIAGNOSIS:    1- Kappa light chain IgM multiple myeloma in a 89-year-old patient.  Bone marrow biopsy on 11/17/2016 showed hypercellular bone marrow with 65% cellularity of light chain restricted plasma cells.  FISH or G-banding could not be performed due to insufficient sample.   There is a background of myelodysplastic syndrome.  The patient was initially seen in the hospital on 11/17/2016 for macrocytic anemia and pancytopenia and transfusion requirement and a bone marrow biopsy was performed.   2- History of prostate cancer s/p EBRT s/p brachytherapy in 2003 (recent PSA 0.10), superficial bladder cancer ,no recurrences since 1986      TREATMENT: 12/15/16 velcade/dex. 4/12/17 added cyclophosphamide 300 mg weekly.6/28/17 we discontinued velcade due to concern for neuropathy and continued with weekly cyclophosphamide and dexamethasone.   1/2/18 switched to Carfilzomib and dexamethasone.   3/22/18 started Daratumumab/Vekcade and Dex       SUBJECTIVE:  The patient was seen as a followup today. He was fatigued. He stated that he is bothered by dyspnea.  "His son was at bedside.      REVIEW OF SYSTEMS:  A complete review of systems was performed and found to be negative other than pertinent positives mentioned in history of present illness.      Past medical, social histories reviewed.     Meds- Reviewed.    PHYSICAL EXAMINATION:   /74 (BP Location: Left arm)  Pulse 66  Temp 98.1  F (36.7  C) (Oral)  Resp 18  Ht 1.778 m (5' 10\")  Wt 95.1 kg (209 lb 9.6 oz)  SpO2 99%  BMI 30.07 kg/m2   CONSTITUTIONAL: Laying on bed. Appears tired.   NEUROLOGIC: Alert, awake.   LYMPHATICS: No edema.   PSYCH: Mood and affect was normal.    LABORATORY DATA AND IMAGING REVIEWED DURING THIS VISIT:  Recent Labs   Lab Test  03/23/18   0532  03/22/18   1335   01/22/18   0837  01/21/18   0834  01/20/18   0220   NA  135  140   < >  141  140  139   POTASSIUM  4.2  3.5   < >  4.1  4.1  4.1   CHLORIDE  106  108   < >  109  108  107   CO2  20  23   < >  23  22  22   ANIONGAP  9  9   < >  9  10  10   BUN  36*  24   < >  25  29  45*   CR  1.06  0.95   < >  1.01  1.02  1.45*   GLC  446*  168*   < >  110*  183*  75   MICHELLE  8.0*  8.5   < >  8.9  8.8  9.1   MAG  1.8   --    --   2.3   --   2.2   PHOS   --    --    --    --   2.9  2.2*    < > = values in this interval not displayed.     Recent Labs   Lab Test  03/25/18   0545  03/24/18   1656  03/24/18   0600   03/22/18   1335  03/22/18   0840  03/14/18   1247   WBC  4.7   --   4.6   --   5.6  3.6*  4.1   HGB  9.8*  9.8*  7.6*   < >  8.6*  7.7*  8.0*   PLT  82*   --   72*   --   91*  178  72*   MCV   --    --   113*   --   115*  114*  111*   NEUTROPHIL   --    --    --    --   93.5  75.3  86.1    < > = values in this interval not displayed.     Recent Labs   Lab Test  03/22/18   1335  03/07/18   1130  02/07/18   1100   11/17/16   0938   BILITOTAL  0.3  0.5  0.4   < >   --    ALKPHOS  86  99  99   < >   --    ALT  15  14  14   < >   --    AST  16  15  14   < >   --    ALBUMIN  2.7*  3.1*  2.7*   < >   --    LDH   --    --    --    --   110    < " > = values in this interval not displayed.         Results for orders placed or performed during the hospital encounter of 03/22/18   XR Chest 2 Views    Narrative    CHEST TWO VIEWS   3/25/2018 12:21 PM     HISTORY: Acute respiratory failure with right basal crackles, rule out  aspiration pneumonia.     COMPARISON: 1/20/2018.      Impression    IMPRESSION: New right basilar consolidation with right pleural  effusion concerning for pneumonia. There is also small left pleural  effusion and mild left basilar opacity. No pneumothorax visualized.  Cardiac silhouette is unchanged. There are atherosclerotic  calcifications of the aortic arch.    ANGELIQUE CANCINO MD     ASSESSMENT AND RECOMMENDATIONS:  This is a 89-year-old gentleman who has kappa light chain multiple myeloma with hypercellular marrow with 65% of cells are light chain restricted plasma cells.  He had severe pancytopenia and has needed a transfusion in the hospital.  He did not have hypercalcemia and his renal function was normal. He has a background of myelodysplastic syndrome on his bone marrow biopsy; however, majority of the cell population was monoclonal plasma cell population.   He was started on treatment due to cytopenia.   In 4/2017 his light chain levels were getting worse. M spike was stable. We added cyclophosphamide to the regimen. In 6/2017 discontinued velcade for concern of development of neuropathy, in hindsight the pain appeared to be related to arthritis.   He is on Aranesp 300 mcg SQ every three weeks.  He was give a dose of daratumumab/Dex/Velcade on 3/22 and ended up in the hospital with Afib.     - CXR is consistent with pneumonia, he is on antibiotics.   - His risk of thrombosis is high. I have started him on Lovenox 40 mg SQ daily.  - Monitor daily CBC diff     GERTEL QUINN MD[AR1.1]    3/25/2018[AR1.2]       Revision History        User Key Date/Time User Provider Type Action    > AR1.2 3/25/2018  3:15 PM Gretel Quinn MD Physician Sign      AR1.1 3/25/2018  3:12 PM Gretel Quinn MD Physician             Progress Notes by Kayode Solano SLP at 3/25/2018  2:08 PM     Author:  Kayode Solano SLP Service:  Acute IP Rehab Author Type:  Speech Language Pathologist    Filed:  3/25/2018  2:08 PM Date of Service:  3/25/2018  2:08 PM Creation Time:  3/25/2018  2:08 PM    Status:  Signed :  Kayode Solano SLP (Speech Language Pathologist)            03/25/18 1400   General Information   Onset Date 03/25/18   Start of Care Date 03/25/18   Referring Physician Dr. Moya   Patient Profile Review/OT: Additional Occupational Profile Info See Profile for full history and prior level of function   Patient/Family Goals Statement tolerate oral intake without coughing   Swallowing Evaluation Bedside swallow evaluation   Clinical Swallow Evaluation   Oral Musculature generally intact   Dentition other (see comments)  (adequate, missing a few R bottom)   Mandibular Strength and Mobility intact   Oral Labial Strength and Mobility WFL   Lingual Strength and Mobility WFL   Buccal Strength and Mobility intact   Laryngeal Function Throat clear;Swallow;Voicing initiated   Clinical Swallow Eval: Thin Liquid Texture Trial   Mode of Presentation, Thin Liquids cup;self-fed   Volume of Liquid or Food Presented singles sips, mutliple sips   Oral Phase of Swallow WFL   Pharyngeal Phase of Swallow intact   Diagnostic Statement No overt s/s aspiration on thin liquids   Clinical Swallow Eval: Puree Solid Texture Trial   Mode of Presentation, Puree spoon;fed by clinician   Volume of Puree Presented 1/2t x 5   Oral Phase, Puree WFL   Pharyngeal Phase, Puree intact   Diagnostic Statement No overt s/s aspiration on pureed textures   Clinical Swallow Eval: Solid Food Texture Trial   Mode of Presentation, Solid self-fed   Volume of Solid Food Presented 1 reji cracker   Oral Phase, Solid WFL   Pharyngeal Phase, Solid intact   Diagnostic Statement No overt s/s aspiration on solids    Swallow Compensations   Swallow Compensations Pacing;Reduce amounts   Results No difficulties noted   General Therapy Interventions   Planned Therapy Interventions Dysphagia Treatment   Dysphagia treatment Modified diet education;Instruction of safe swallow strategies   Intervention Comments strategy use for SOB and safe swallowing, tolerance DD3   Swallow Eval: Clinical Impressions   Skilled Criteria for Therapy Intervention Skilled criteria met.  Treatment indicated.   Functional Assessment Scale (FAS) 5   Treatment Diagnosis mild oropharyngeal dysphagia   Diet texture recommendations Dysphagia diet level 3;Thin liquids   Recommended Feeding/Eating Techniques alternate between small bites and sips of food/liquid;maintain upright posture during/after eating for 30 mins;no straws;small sips/bites   Therapy Frequency other (see comments)  (1-2 sessions for diet tolerance/strategy use)   Predicted Duration of Therapy Intervention (days/wks) (5 days)   Anticipated Discharge Disposition home   Risks and Benefits of Treatment have been explained. Yes   Patient, family and/or staff in agreement with Plan of Care Yes   Clinical Impression Comments Nursing and pt report coughing while taking pills.  Bedside evaluation completed.  Pt's oral mech evaluation was wnl.  He became very ill during chemo session and was admitted to hospital.  Pt is very SOB.  Positioned upright in bed.  Independent with feeding.  No overt s/s aspiration with thin liquids, pudding or cracker.  Oral transit times and oral prep times wfl all textures.  Laryngeal elevation all swallows with clear voicing.  Instructed pt on strategies for dealing with SOB including small bites, single sips, no straws, change of diet to DD3 from regular.  Pt reports that he does cut up his food quite a bit at home and was interested in trying DD3 diet/thin liquids.  Recommend:  DD3/thin liquids, in chair all meals, small bites/sips, alternate solids and liquids, slow  rate of intake, no straws and medication crushed in applesauce.  SLP to follow 1-2 days for diet tolerance. Pt may need to continue DD3 if pursues chemotherapy.   Total Evaluation Time   Total Evaluation Time (Minutes) 15[MP1.1]        Revision History        User Key Date/Time User Provider Type Action    > MP1.1 3/25/2018  2:08 PM Kayode Solano, SLP Speech Language Pathologist Sign            Progress Notes by Micky Moya MD at 3/24/2018 12:58 PM     Author:  Micky Moya MD Service:  Hospitalist Author Type:  Physician    Filed:  3/25/2018  1:41 PM Date of Service:  3/24/2018 12:58 PM Creation Time:  3/24/2018 12:58 PM    Status:  Addendum :  Micky Moya MD (Physician)         [facility]    Hospitalist Progress Note  Micky Moya MD    Assessment & Plan     89-year-old gentleman, with PmHx of multiple myeloma initially diagnosed in 11/2016, MDS, prostate carcinoma, bladder carcinoma, paroxysmal atrial fibrillation, Hypertension, Hyperlipidemia, pancytopenia due to multiple myeloma and chemotherapy, who was admitted on 3/22/18 from the Oncology Clinic, due to symptoms of cough, shortness of breath, palpitations and tremors, while receiving cycle 1 day 1 of daratumumab/dexamethasone  . He was found to be in AFIB rate 140s-150s.  EKG on 3/22/18 showed, atrial fibrillation with ventricular rate of 145. Work up done on 3/22/18 revealed, CMP significant for Alb 2.7. CBC revealed, Hb 8.6, WBC 5.6 and Plts 91. Troponin was <0.015, lactic acid 2.2.        1.   Paroxysmal Atrial fibrillation with rapid ventricular response: now in NSR, with HR in the 60s and 70s. S/P IV diltiazem 10mg x 2 doses. S/P IV diltiazem drip.  Continue toprol XL 50mg qd.. The pt is not on anticoagulation due to history of lower gastrointestinal bleed.  ECHO on 01/11/2018 showed, LVEF 65%, no regional wall motion abnormalities, RV was normal, no hemodynamically  significant valvular abnormalities. TSH on 3/23/18 was normal.       2.   Multiple myeloma: pt was commenced on daratumumab/dexamethasone on 3/22/18,  due to progression of his myeloma. Appreciate Oncology input. For outpt follow up.      3.   Hypertension: Continue amlodipine and toprol XL.    4.   Type 2, Diabetes mellitus: BG is ranging 142-->221 today. Pt is on oral dexamethasone qd. HbA1C was 5.5% on 3/22/18. Continue Levemir 20u. For high dose insulin aspart sliding scale prn.      5.   Chemotherapy induced Pancytopenia: CBC revealed, Hb 8.6[AT1.1]-->7.6[AT1.2], WBC 5.6[AT1.1]-->4.6[AT1.2] and Plts 9[AT1.1]1-->72[AT1.2] on 3/2[AT1.1]4[AT1.2]/18. For[AT1.1] transfusion of 1u of PRBCs today.[AT1.2]      DVT Prophylaxis: Ambulate every shift  Code Status: DNR/DNI    Disposition: Expected discharge in 1 day.      Interval History   The pt[AT1.1] has lethargy. He is in NSR rate in the 60s/70s.[AT1.2]     -Data reviewed today: I reviewed all new labs and imaging results over the last 24 hours. I personally reviewed no images or EKG's today.    Physical Exam   Temp: 98.6  F (37  C) Temp src: Oral BP: 143/71   Heart Rate: 72 Resp: 16 SpO2: 93 % O2 Device: None (Room air) Oxygen Delivery: 1 LPM  Vitals:    03/22/18 1656 03/23/18 0400 03/24/18 0640   Weight: 94.2 kg (207 lb 10.8 oz) 95.4 kg (210 lb 6.4 oz) 95.1 kg (209 lb 9.6 oz)     Vital Signs with Ranges  Temp:  [95.4  F (35.2  C)-98.6  F (37  C)] 98.6  F (37  C)  Heart Rate:  [68-84] 72  Resp:  [16-20] 16  BP: (116-160)/(60-85) 143/71  SpO2:  [91 %-97 %] 93 %  I/O last 3 completed shifts:  In: 490 [P.O.:490]  Out: -     Constitutional: Elderly white male, awake, cooperative, no apparent distress, O2 Sats 9[AT1.1]3[AT1.2]% on RA  Respiratory: BS vesicular bilaterally, no crackles or wheezing  Cardiovascular: S1 and S2, reg, no murmur noted  GI: Soft, non-distended, non-tender, no masses, BS present+  Skin/Integumen: No rashes  Extremities: Trace bilateral pedal  edema    Medications     diltiazem (CARDIZEM) infusion ADULT Stopped (03/22/18 5596)     - MEDICATION INSTRUCTIONS -         sodium chloride (PF)  3 mL Intracatheter Q8H     insulin detemir  20 Units Subcutaneous QAM AC     insulin aspart  1-10 Units Subcutaneous TID AC     insulin aspart  1-7 Units Subcutaneous At Bedtime     metoprolol succinate  50 mg Oral Daily     acyclovir  400 mg Oral BID     amLODIPine  5 mg Oral Daily     ascorbic acid (VITAMIN C) tablet 1,000 mg  1,000 mg Oral Daily     dexamethasone  4 mg Oral Daily with breakfast     ferrous sulfate  325 mg Oral Daily with breakfast     gemfibrozil  600 mg Oral BID     levothyroxine  25 mcg Oral Daily     omeprazole  20 mg Oral QAM     cholecalciferol  1,000 Units Oral Daily       Data     Recent Labs  Lab 03/24/18  0600 03/23/18  1259 03/23/18  0532 03/22/18  1335 03/22/18  0840   WBC 4.6  --   --  5.6 3.6*   HGB 7.6* 7.7*  --  8.6* 7.7*   *  --   --  115* 114*   PLT 72*  --   --  91* 178   NA  --   --  135 140  --    POTASSIUM  --   --  4.2 3.5  --    CHLORIDE  --   --  106 108  --    CO2  --   --  20 23  --    BUN  --   --  36* 24  --    CR  --   --  1.06 0.95  --    ANIONGAP  --   --  9 9  --    MICHELLE  --   --  8.0* 8.5  --    GLC  --   --  446* 168*  --    ALBUMIN  --   --   --  2.7*  --    PROTTOTAL  --   --   --  7.8  --    BILITOTAL  --   --   --  0.3  --    ALKPHOS  --   --   --  86  --    ALT  --   --   --  15  --    AST  --   --   --  16  --    TROPI  --   --   --  <0.015  --        No results found for this or any previous visit (from the past 24 hour(s)).[AT1.1]       Revision History        User Key Date/Time User Provider Type Action    > [N/A] 3/25/2018  1:41 PM Micky Moya MD Physician Addend     AT1.2 3/24/2018  1:04 PM Micky Moya MD Physician Sign     AT1.1 3/24/2018 12:58 PM Micky Moya MD Physician             ED Provider Notes by Nelli Meza MD at 3/22/2018  1:16 PM      Author:  Nelli Meza MD Service:  Emergency Medicine Author Type:  Physician    Filed:  3/24/2018 11:30 PM Date of Service:  3/22/2018  1:16 PM Creation Time:  3/22/2018  1:51 PM    Status:  Signed :  Nelli Meza MD (Physician)           History     Chief Complaint:[TS1.1]  Allergic Reaction[TS1.2]    HPI   Roc Parkinson is a 89 year old male[TS1.1] with a history of malignant neoplasm, diabetes, heart disease,[TS1.2]  who presents to the emergency department today for evaluation of allergic reaction. The patient was undergoing a new chemo treatment today in clinic when approximately 1.5 hours into the infusion, he complained of generalized weakness and fast heart rate. He was given medication to treat for an allergic reaction, but was still experiencing atrial fibrillation with RVR.[TS1.1] While in the emergency department, the patient states that he no longer feels his heart racing.[TS1.2]     Allergies:[TS1.1]  Drug allergies reviewed. No pertinent drug allergies.[TS1.2]     Medications:[TS1.1]    LORazepam (ATIVAN) 0.5 MG tablet  prochlorperazine (COMPAZINE) 10 MG tablet  dexamethasone (DECADRON) 4 MG tablet  acyclovir (ZOVIRAX) 400 MG tablet  omeprazole (PRILOSEC) 20 MG CR capsule  levothyroxine (SYNTHROID/LEVOTHROID) 25 MCG tablet  NOVOLOG FLEXPEN 100 UNIT/ML soln  insulin aspart (NOVOLOG FLEXPEN) 100 UNIT/ML injection  INSULIN ASPART SC  VITAMIN D, CHOLECALCIFEROL, PO  metoprolol tartrate (LOPRESSOR) 25 MG tablet  tamsulosin (FLOMAX) 0.4 MG capsule  polyethylene glycol (MIRALAX/GLYCOLAX) Packet  insulin detemir (LEVEMIR FLEXPEN/FLEXTOUCH) 100 UNIT/ML injection  LISINOPRIL PO  amLODIPine (NORVASC) 5 MG tablet  furosemide (LASIX) 20 MG tablet  gemfibrozil (LOPID) 600 MG tablet  Ascorbic Acid (VITAMIN C PO)  ferrous sulfate (IRON) 325 (65 FE) MG tablet  Selenium 200 MCG TABS[TS1.2]    Past Medical History:[TS1.1]    Arthritis   Blood transfusion   Diabetes mellitus (H)   Heart  "disease   Hyperlipidemia   Hypertension   Hypothyroidism   Malignant neoplasm (H)[TS1.2]    Past Surgical History:[TS1.1]    Knee arthroplasty   Bladder biopsy  TURP and bladder scraping  Anal fistula  Left knee surgery   Phacoemulsification clear cornea with standard intraocular lens implant   Soft tissue surgery for melanoma[TS1.2]    Family History:[TS1.1]    Heart arrhythmia  Father   Paget's Disease Brother[TS1.2]     Social History:  The patient was accompanied to the ED by[TS1.1] EMS[TS1.2].  Smoking Status: Never Smoker  Smokeless Tobacco: Never Used  Alcohol Use: Negative   Marital Status:       Review of Systems   Cardiovascular: Positive for[TS1.1] palpitations[TS1.3].   Neurological: Positive for[TS1.1] weakness[TS1.3].[TS1.1]   All other systems reviewed and are negative[TS1.3].    Physical Exam[TS1.1]     Patient Vitals for the past 24 hrs:   BP Temp Temp src Heart Rate Resp SpO2 Height Weight   03/22/18 1615 136/80 - - 107 19 96 % - -   03/22/18 1600 143/64 - - 106 24 96 % - -   03/22/18 1545 - - - 112 16 97 % - -   03/22/18 1530 129/81 - - 99 18 96 % - -   03/22/18 1515 128/82 - - - - - - -   03/22/18 1500 127/88 - - 141 21 96 % - -   03/22/18 1445 136/85 - - - 19 95 % - -   03/22/18 1430 (!) 137/91 - - 134 23 95 % - -   03/22/18 1415 142/79 - - 134 20 96 % - -   03/22/18 1400 125/84 - - - - - - -   03/22/18 1345 137/83 - - 137 25 94 % - -   03/22/18 1328 - 100  F (37.8  C) Oral - - - - -   03/22/18 1325 108/84 - - 141 18 91 % 1.778 m (5' 10\") 95.3 kg (210 lb)[TS1.4]      Physical Exam[TS1.1]  General: Resting on the gurney  Head:  The scalp, face, and head appear normal  Mouth/Throat: Mucus membranes are moist  CV:  Rapid irregular rate    Normal S1 and S2  No pathological murmur   Resp:  Breath sounds clear and equal bilaterally    Non-labored, no retractions or accessory muscle use    No coarseness    No wheezing   GI:  Abdomen is soft, no rigidity    No tenderness to palpation  MS:  Normal " motor assessment of all extremities.    Good capillary refill noted.    Lower extremities without marked edema, redness, swelling, or excess warmth.  Skin:  No rash or lesions noted.  Neuro: Speech is normal and fluent. No apparent deficit. Cranial nerves II-XII intact. Sensations intact. Strengths intact x4.  Psych:  Awake. Alert.  Normal affect.      Appropriate interactions.[TS1.5]    Emergency Department Course     ECG:  ECG taken at[TS1.1] 1329  Atrial fibrillation with rapid ventricular response  Right bundle branch block  Abnormal[TS1.6] ECG  Rate[TS1.1] 140[TS1.6] bpm. ND interval[TS1.1] *[TS1.6] ms. QRS duration[TS1.1] 134[TS1.6] ms. QT/QTc[TS1.1] 338/516[TS1.6] ms. P-R-T axes[TS1.1] * 26 -18[TS1.6].    Laboratory:  Laboratory findings were communicated with the[TS1.1] patient[TS1.7] who voiced understanding of the findings.[TS1.1]    Lactic acid whole blood (Collected 1355): 2.2 (H)[TS1.7]  CBC: WBC[TS1.1] 5.6[TS1.7], HGB[TS1.1] 8.6 (L)[TS1.7], PLT[TS1.1] 91 (L)  C[TS1.7]MP:[TS1.1] Glucose (Collected 1335) 168 (H), Albumin 2.7 (L)[TS1.7] o/w WNL (Creatinine[TS1.1] 0.95[TS1.7])[TS1.1]  Troponin I (Collected 1335) <0.015[TS1.7]     Interventions:[TS1.1]  1413 NS 1000 mL IV   1413 Cardizem 10 mg IV[TS1.3]   1535 Cardizem 5 mg/hr IV   1516 Cardizem 10 mg IV[TS1.7]     Emergency Department Course:    Nursing notes and vitals reviewed.[TS1.1]    1405[TS1.6] I performed an exam of the patient as documented above.[TS1.1]     IV was inserted and blood was drawn for laboratory testing, results above.[TS1.3]      IV was inserted and blood was drawn for laboratory testing, results above.      1533[TS1.7] I personally reviewed the[TS1.1] EKG and lab[TS1.7] results with the[TS1.1] patient and family[TS1.7] and answered all related questions prior to[TS1.1] admission[TS1.7].[TS1.1] I discussed the treatment plan with the patient. They expressed understanding of this plan and consented to admission.     1535 I discussed  the patient with Dr. Manjeet Dawson, who will admit the patient to a monitored bed for further evaluation and treatment.[TS1.7]     Impression & Plan      Medical Decision Making:[TS1.1]  Roc Parkinson is a 89 year old male who presents for evaluation of atrial fibrillation with RVR during a chemo infusion. Based on chronicity of symptoms and unclear nature of onset, I elected to attempt to control rate instead of rhythm control with diltiazem[TS1.7] infusion[TS1.4].  This was successful in controlling rate.  Patient with normal blood pressure and does not appear hemodynamically compromised.  Lab evaluation was unremarkable, no evidence of acute anemia[TS1.7] ronic anemia is due to myelodisplastic disorder)[KD1.1] or infectious process. I doubt acute coronary syndrome, thyroid issues, PE, dissection, drug ingestion, acute electrolyte imbalance, etc.  Will admit to telemetry for further cares.[TS1.7]      Diagnosis:[TS1.1]    ICD-10-CM    1. Atrial fibrillation with rapid ventricular response (H) I48.91[TS1.4]      Disposition:[TS1.1]   The patient is admitted into the care of Dr. Dawson.[TS1.7]     CMS Diagnoses:[TS1.1] Lactate is greater than 1.9 due to most likely secondary to his rapid afib, at this time there is no sign of severe sepsis or septic shock.[TS1.4] Recheck was 1.6.[KD1.1]    Scribe Disclosure:  I, Marlen Metz, am serving as a scribe at 1:51 PM on 3/22/2018 to document services personally performed by Nelli Meza MD[TS1.1],[TS1.2] based on my observations and the provider's statements to me.       EMERGENCY DEPARTMENT[TS1.1]       Nelli Meza MD  03/24/18 0251  [KD1.2]     Revision History        User Key Date/Time User Provider Type Action    > KD1.2 3/24/2018 11:30 PM Nelli Meza MD Physician Sign     KD1.1 3/24/2018 11:29 PM Nelli Meza MD Physician      TS1.4 3/22/2018  4:47 PM Marlen Metz     TS1.7 3/22/2018  4:22 PM Marlen Metz     TS1.5  3/22/2018  3:00 PM Marlen Metz Share     TS1.3 3/22/2018  2:25 PM Marlen Metz Share     TS1.2 3/22/2018  2:19 PM Marlen Metz Share     [N/A] 3/22/2018  2:08 PM Marlen Metz Share     TS1.6 3/22/2018  1:54 PM Marlen Metz Share     TS1.1 3/22/2018  1:51 PM Marlen Metz             Progress Notes by Gretel Quinn MD at 3/24/2018  4:51 PM     Author:  Gretel Quinn MD Service:  Oncology Author Type:  Physician    Filed:  3/24/2018  4:56 PM Date of Service:  3/24/2018  4:51 PM Creation Time:  3/24/2018  4:51 PM    Status:  Signed :  Gretel Quinn MD (Physician)         [facility] PHYSICIANS  HEMATOLOGY ONCOLOGY    DIAGNOSIS:    1- Kappa light chain IgM multiple myeloma in a 89-year-old patient.  Bone marrow biopsy on 11/17/2016 showed hypercellular bone marrow with 65% cellularity of light chain restricted plasma cells.  FISH or G-banding could not be performed due to insufficient sample.   There is a background of myelodysplastic syndrome.  The patient was initially seen in the hospital on 11/17/2016 for macrocytic anemia and pancytopenia and transfusion requirement and a bone marrow biopsy was performed.   2- History of prostate cancer s/p EBRT s/p brachytherapy in 2003 (recent PSA 0.10), superficial bladder cancer ,no recurrences since 1986      TREATMENT: 12/15/16 velcade/dex. 4/12/17 added cyclophosphamide 300 mg weekly.6/28/17 we discontinued velcade due to concern for neuropathy and continued with weekly cyclophosphamide and dexamethasone.   1/2/18 switched to Carfilzomib and dexamethasone.   3/22/18 started Daratumumab/Vekcade and Dex       SUBJECTIVE:  The patient was seen as a followup today. He is admitted with Afib.      REVIEW OF SYSTEMS:  A complete review of systems was performed and found to be negative other than pertinent positives mentioned in history of present illness.      Past medical, social histories reviewed.     Meds-  "Reviewed.    PHYSICAL EXAMINATION:[AR1.1]   /78 (BP Location: Right arm)  Pulse 69  Temp 96.3  F (35.7  C) (Oral)  Resp 16  Ht 1.778 m (5' 10\")  Wt 95.1 kg (209 lb 9.6 oz)  SpO2 90%  BMI 30.07 kg/m2[AR1.2]   CONSTITUTIONAL: Laying on bed. Appears tired.   NEUROLOGIC: Alert, awake.   LYMPHATICS: No edema.   PSYCH: Mood and affect was normal.    LABORATORY DATA AND IMAGING REVIEWED DURING THIS VISIT:  Recent Labs   Lab Test  03/23/18   0532  03/22/18   1335   01/22/18   0837  01/21/18   0834  01/20/18   0220   NA  135  140   < >  141  140  139   POTASSIUM  4.2  3.5   < >  4.1  4.1  4.1   CHLORIDE  106  108   < >  109  108  107   CO2  20  23   < >  23  22  22   ANIONGAP  9  9   < >  9  10  10   BUN  36*  24   < >  25  29  45*   CR  1.06  0.95   < >  1.01  1.02  1.45*   GLC  446*  168*   < >  110*  183*  75   MICHELLE  8.0*  8.5   < >  8.9  8.8  9.1   MAG  1.8   --    --   2.3   --   2.2   PHOS   --    --    --    --   2.9  2.2*    < > = values in this interval not displayed.     Recent Labs   Lab Test  03/24/18   0600  03/23/18   1259  03/22/18   1335  03/22/18   0840  03/14/18   1247   WBC  4.6   --   5.6  3.6*  4.1   HGB  7.6*  7.7*  8.6*  7.7*  8.0*   PLT  72*   --   91*  178  72*   MCV  113*   --   115*  114*  111*   NEUTROPHIL   --    --   93.5  75.3  86.1     Recent Labs   Lab Test  03/22/18   1335  03/07/18   1130  02/07/18   1100   11/17/16   0938   BILITOTAL  0.3  0.5  0.4   < >   --    ALKPHOS  86  99  99   < >   --    ALT  15  14  14   < >   --    AST  16  15  14   < >   --    ALBUMIN  2.7*  3.1*  2.7*   < >   --    LDH   --    --    --    --   110    < > = values in this interval not displayed.     ASSESSMENT AND RECOMMENDATIONS:  This is a 89-year-old gentleman who has kappa light chain multiple myeloma with hypercellular marrow with 65% of cells are light chain restricted plasma cells.  He had severe pancytopenia and has needed a transfusion in the hospital.  He did not have hypercalcemia and his " renal function was normal. He has a background of myelodysplastic syndrome on his bone marrow biopsy; however, majority of the cell population was monoclonal plasma cell population.   He was started on treatment due to cytopenia.   In 4/2017 his light chain levels were getting worse. M spike was stable. We added cyclophosphamide to the regimen. In 6/2017 discontinued velcade for concern of development of neuropathy, in hindsight the pain appeared to be related to arthritis.      He is on Aranesp 300 mcg SQ every three weeks.  - He was give a dose of daratumumab/Dex/Velcade on 3/22 and ended up in the hospital with Afib.   - He can discharged home and will need follow up with me next week.      GRETEL QUINN MD[AR1.1]    3/24/2018[AR1.3]       Revision History        User Key Date/Time User Provider Type Action    > AR1.3 3/24/2018  4:56 PM Gretel Quinn MD Physician Sign     AR1.2 3/24/2018  4:54 PM Gretel Quinn MD Physician      AR1.1 3/24/2018  4:51 PM Gretel Quinn MD Physician             Progress Notes by Micky Moya MD at 3/23/2018 11:44 AM     Author:  Micky Moya MD Service:  Hospitalist Author Type:  Physician    Filed:  3/24/2018  1:01 PM Date of Service:  3/23/2018 11:44 AM Creation Time:  3/23/2018 11:44 AM    Status:  Addendum :  Micky Moya MD (Physician)         [facility]    Hospitalist Progress Note[AT1.1]  Micky Moya[AT1.2], MD[AT1.1]    Assessment & Plan[AT1.2]     89-year-old gentleman, with PmHx of multiple myeloma initially diagnosed in 11/2016, MDS, prostate carcinoma, bladder carcinoma, paroxysmal atrial fibrillation, Hypertension, Hyperlipidemia, pancytopenia due to multiple myeloma and chemotherapy, who was admitted on 3/22/18 from the Oncology Clinic, due to symptoms of cough, shortness of breath, palpitations and tremors, while receiving cycle 1 day 1 of daratumumab/dexamethasone  . He was found to be in  AFIB rate 140s-150s.  EKG on 3/22/18 showed, atrial fibrillation with ventricular rate of 145. Work up done on 3/22/18 revealed, CMP significant for Alb 2.7. CBC revealed, Hb 8.6, WBC 5.6 and Plts 91. Troponin was <0.015, lactic acid 2.2.        1.   Paroxysmal Atrial fibrillation with rapid ventricular response:[AT1.1] now in NSR.[AT1.3] S/P IV diltiazem 10mg x 2 doses.[AT1.1] S/P IV diltiazem drip. HR is running in the 70s/80s. Switched metoprolol 25mg po bid to toprol XL 50mg qd.[AT1.3]. The pt is not on anticoagulation due to lower gastrointestinal bleed.  ECHO on 01/11/2018 showed, LVEF 65%, no regional wall motion abnormalities, RV was normal, no hemodynamically significant valvular abnormalities. TSH[AT1.1] on 3/23/18 was normal.[AT1.3]       2.   Multiple myeloma: pt was commenced on daratumumab/dexamethasone on 3/22/18[AT1.1],[AT1.3]  due to progression of his[AT1.1] myeloma[AT1.3]. Appreciate Oncology input.       3.   Hypertension: Continue amlodipine[AT1.1] and toprol XL[AT1.4].    4.   Type 2, Diabetes mellitus: BG is running in the 300s and 400s[AT1.1] today[AT1.3]. This is due to the pre-medication of[AT1.1] IV[AT1.5] Dexamethasone received on 3/22/18 at the Oncology Clinic[AT1.1] and current oral dexamethasone[AT1.5]. HbA1C was 5.5% on 3/22/18.[AT1.1] Increased[AT1.5] Levemir[AT1.1] from 16u to 20u[AT1.5] today[AT1.3]. For high dose insulin aspart sliding scale prn.      5.   Chemotherapy induced Pancytopenia: CBC on 3/22/18 revealed, Hb 8.6, WBC 5.6 and Plts 91 on 3/22/18.[AT1.1] For Hb today - 3/23/18.[AT1.3]       DVT Prophylaxis: Ambulate every shift  Code Status:[AT1.1] DNR/DNI[AT1.2]    Disposition: Expected discharge in[AT1.1] 1[AT1.3] day[AT1.1].[AT1.3]      Interval History[AT1.2]   The pt reported having shortness of breath on exertion. No complaints of chest pain or palpitations. He has converted to NSR with IV diltiazem, with HR in the 70s/80s.[AT1.3]    -Data reviewed today: I reviewed  all new labs and imaging results over the last 24 hours. I personally reviewed[AT1.1] no images or EKG's today[AT1.3].[AT1.1]    Physical Exam   Temp: 97.2  F (36.2  C) Temp src: Oral BP: 141/84 Pulse: 99 Heart Rate: 83 Resp: 18 SpO2: 96 % O2 Device: Nasal cannula Oxygen Delivery: 1 LPM  Vitals:    03/22/18 1325 03/22/18 1656 03/23/18 0400   Weight: 95.3 kg (210 lb) 94.2 kg (207 lb 10.8 oz) 95.4 kg (210 lb 6.4 oz)[AT1.2]     Vital Signs with Ranges[AT1.1]  Temp:  [97.2  F (36.2  C)-100  F (37.8  C)] 97.2  F (36.2  C)  Pulse:  [] 99  Heart Rate:  [] 83  Resp:  [16-25] 18  BP: (108-164)/() 141/84  SpO2:  [91 %-97 %] 96 %  I/O last 3 completed shifts:  In: 1240 [P.O.:240; IV Piggyback:1000]  Out: 650 [Urine:650][AT1.2]    Constitutional:[AT1.1] Elderly white male,[AT1.3] awake, cooperative, no apparent distress[AT1.1], O2 Sats 91% on RA[AT1.3]  Respiratory: BS vesicular bilaterally, no crackles or wheezing  Cardiovascular: S1 and S2, reg, no murmur noted  GI: Soft, non-distended, non-tender, no masses, BS present+  Skin/Integumen: No rashes[AT1.1]  Extremities: Trace bilateral pedal edema[AT1.3]    Medications     diltiazem (CARDIZEM) infusion ADULT Stopped (03/22/18 7446)     - MEDICATION INSTRUCTIONS -         sodium chloride (PF)  3 mL Intracatheter Q8H     acyclovir  400 mg Oral BID     amLODIPine  5 mg Oral Daily     ascorbic acid (VITAMIN C) tablet 1,000 mg  1,000 mg Oral Daily     dexamethasone  4 mg Oral Daily with breakfast     ferrous sulfate  325 mg Oral Daily with breakfast     gemfibrozil  600 mg Oral BID     insulin detemir  16 Units Subcutaneous QAM AC     levothyroxine  25 mcg Oral Daily     metoprolol tartrate  25 mg Oral BID     omeprazole  20 mg Oral QAM     cholecalciferol  1,000 Units Oral Daily     insulin aspart  1-7 Units Subcutaneous TID AC     insulin aspart  1-5 Units Subcutaneous At Bedtime       Data     Recent Labs  Lab 03/23/18  0532 03/22/18  1335 03/22/18  0840   WBC   --  5.6 3.6*   HGB  --  8.6* 7.7*   MCV  --  115* 114*   PLT  --  91* 178    140  --    POTASSIUM 4.2 3.5  --    CHLORIDE 106 108  --    CO2 20 23  --    BUN 36* 24  --    CR 1.06 0.95  --    ANIONGAP 9 9  --    MICHELLE 8.0* 8.5  --    * 168*  --    ALBUMIN  --  2.7*  --    PROTTOTAL  --  7.8  --    BILITOTAL  --  0.3  --    ALKPHOS  --  86  --    ALT  --  15  --    AST  --  16  --    TROPI  --  <0.015  --        No results found for this or any previous visit (from the past 24 hour(s)).[AT1.2]       Revision History        User Key Date/Time User Provider Type Action    > AT1.4 3/24/2018  1:01 PM Micky Moya MD Physician Addend     AT1.3 3/23/2018  1:05 PM Micky Moya MD Physician Sign     AT1.5 3/23/2018 12:00 PM Micky Moya MD Physician      AT1.2 3/23/2018 11:45 AM Micky Moya MD Physician      AT1.1 3/23/2018 11:44 AM Micky Moya MD Physician                   Procedure Notes     No notes of this type exist for this encounter.         Progress Notes - Therapies (Notes from 03/24/18 through 03/27/18)      Progress Notes by Tico Mcdonnell OT at 3/26/2018  4:27 PM     Author:  Tico Mcdonnell OT Service:  (none) Author Type:  Occupational Therapist    Filed:  3/26/2018  4:27 PM Date of Service:  3/26/2018  4:27 PM Creation Time:  3/26/2018  4:27 PM    Status:  Signed :  Tico Mcdonnell OT (Occupational Therapist)          03/26/18 1500   Quick Adds   Type of Visit Initial Occupational Therapy Evaluation   Living Environment   Lives With alone   Living Arrangements independent living facility   Home Accessibility no concerns   Transportation Available car   Self-Care   Dominant Hand right   Usual Activity Tolerance good   Current Activity Tolerance fair   Equipment Currently Used at Home walker, rolling;grab bar   Functional Level Prior   Ambulation 1-->assistive equipment   Transferring 1-->assistive equipment    Toileting 0-->independent  (comfort height)   Bathing 1-->assistive equipment   Dressing 0-->independent   Eating 0-->independent   Communication 0-->understands/communicates without difficulty   Swallowing 0-->swallows foods/liquids without difficulty   Cognition 0 - no cognition issues reported   Fall history within last six months yes   Number of times patient has fallen within last six months 2   General Information   Onset of Illness/Injury or Date of Surgery - Date 03/22/18   Referring Physician Micky Moya MD   Patient/Family Goals Statement TCU, but prefer home   Additional Occupational Profile Info/Pertinent History of Current Problem Pt admitted for A fib with RVR and multiple myeloma.   Precautions/Limitations fall precautions;oxygen therapy device and L/min  (Pt on 2 L O2 at eval; no supplemental O2 at baseline)   Cognitive Status Examination   Orientation orientation to person, place and time   Level of Consciousness alert   Able to Follow Commands WNL/WFL   Personal Safety (Cognitive) WNL/WFL   Memory (will continue to monitor and assess as needed)   Visual Perception   Visual Perception Wears glasses   Pain Assessment   Patient Currently in Pain No   Range of Motion (ROM)   ROM Comment B UE WFL   Strength   Strength Comments B UE 5/5 MMT   Mobility   Bed Mobility Bed mobility skill: Rolling/Turning;Bed mobility skill: Scooting/Bridging;Bed mobility skill: Sit to supine;Bed mobility skill: Supine to sit;Bed mobility analysis   Bed Mobility Skill: Rolling/Turning   Level of [place] - Bed Mobility Skill Rolling Turning stand-by assist   Bed Mobility Skill: Scooting/Bridging   Level of [place]: Scooting/Bridging stand-by assist   Bed Mobility Skill: Sit to Supine   Level of [place]: Sit/Supine stand-by assist   Bed Mobility Skill: Supine to Sit   Level of [place]: Supine/Sit stand-by assist   Bed Mobility Analysis   Impairments Contributing to Impaired Bed Mobility impaired  balance   Transfer Skills   Transfer Transfer Safety Analysis Bed/Chair;Transfer Skill: Stand to Sit;Transfer Safety Analysis Sit/Stand   Transfer Skill: Bed to Chair/Chair to Bed   Level of [place]: Bed to Chair stand-by assist   Assistive Device - Transfer Skill Bed to Chair Chair to Bed Rehab Eval standard walker   Transfer Safety Analysis Bed/Chair   Impairments Contributing to Impaired Transfers impaired balance   Transfer Skill: Sit to Stand   Level of [place]: Sit/Stand stand-by assist   Assistive Device for Transfer: Sit/Stand standard walker   Transfer Safety Analysis Sit/Stand   Impaired Transfers: Sit/Stand impaired balance   Toilet Transfer   Toilet Transfer Toilet Transfer Skill;Toilet Transfer Safety Analysis   Transfer Skill: Toilet Transfer   Level of [place]: Toilet stand-by assist   Assistive Device standard walker   Transfer Safety Analysis Toilet   Transfer Safety Analysis Toilet impaired balance   Upper Body Dressing   Level of [place]: Dress Upper Body stand-by assist   Lower Body Dressing   Level of [place]: Dress Lower Body minimum assist (75% patients effort)   Toileting   Level of [place]: Toilet stand-by assist   Grooming   Level of [place]: Grooming stand-by assist   Instrumental Activities of Daily Living (IADL)   Previous Responsibilities meal prep;housekeeping;laundry;medication management;driving   IADL Comments Uses pillbox   Activities of Daily Living Analysis   Impairments Contributing to Impaired Activities of Daily Living balance impaired   General Therapy Interventions   Planned Therapy Interventions ADL retraining;transfer training   Clinical Impression   Criteria for Skilled Therapeutic Interventions Met yes, treatment indicated   OT Diagnosis Decreased I in ADLs/IADLs   Influenced by the following impairments Decreased balance, activity tolerance   Assessment of Occupational Performance 1-3 Performance Deficits   Identified Performance  "Deficits Bathing, dressing, IADLs   Clinical Decision Making (Complexity) Low complexity   Therapy Frequency daily   Predicted Duration of Therapy Intervention (days/wks) 3 days   Anticipated Discharge Disposition Transitional Care Facility   Risks and Benefits of Treatment have been explained. Yes   Patient, Family & other staff in agreement with plan of care Yes   [facility] TM \"6 Clicks\"   2016, Trustees of [facility], under license to [employer].  All rights reserved.   6 Clicks Short Forms Daily Activity Inpatient Short Form   [facility]  \"6 Clicks\" Daily Activity Inpatient Short Form   1. Putting on and taking off regular lower body clothing? 3 - A Little   2. Bathing (including washing, rinsing, drying)? 3 - A Little   3. Toileting, which includes using toilet, bedpan or urinal? 3 - A Little   4. Putting on and taking off regular upper body clothing? 3 - A Little   5. Taking care of personal grooming such as brushing teeth? 4 - None   6. Eating meals? 4 - None   Daily Activity Raw Score (Score out of 24.Lower scores equate to lower levels of function) 20   Total Evaluation Time   Total Evaluation Time (Minutes) 5[MM1.1]        Revision History        User Key Date/Time User Provider Type Action    > MM1.1 3/26/2018  4:27 PM Tico Mcdonnell, OT Occupational Therapist Sign            Progress Notes by Agustina Cardoso PT at 3/26/2018  1:24 PM     Author:  Agustina Cardoso PT Service:  (none) Author Type:  Physical Therapist    Filed:  3/26/2018  1:25 PM Date of Service:  3/26/2018  1:24 PM Creation Time:  3/26/2018  1:25 PM    Status:  Signed :  Agustina Cardoso, PT (Physical Therapist)          03/26/18 1300   Quick Adds   Type of Visit Initial PT Evaluation   Living Environment   Lives With alone   Living Arrangements apartment   Home Accessibility no concerns   Transportation Available car   Living Environment Comment Pt lives alone in sr apt, no services " available, pt drives.   Self-Care   Usual Activity Tolerance good   Current Activity Tolerance moderate   Regular Exercise no   Equipment Currently Used at Home walker, rolling   Functional Level Prior   Ambulation 1-->assistive equipment   Transferring 1-->assistive equipment   Toileting 1-->assistive equipment   Bathing 0-->independent   Dressing 0-->independent   Eating 0-->independent   Communication 0-->understands/communicates without difficulty   Swallowing 0-->swallows foods/liquids without difficulty   Cognition 0 - no cognition issues reported   Fall history within last six months yes   Number of times patient has fallen within last six months 2   Which of the above functional risks had a recent onset or change? ambulation   Prior Functional Level Comment pt manages all ADL's - drives to grocery store, makes own meals, cleans apt, etc. No family in town or friends to assist.   General Information   Onset of Illness/Injury or Date of Surgery - Date 03/22/18   Referring Physician Dr. Moya   Patient/Family Goals Statement hopes to go home when ready   Pertinent History of Current Problem (include personal factors and/or comorbidities that impact the POC) Pt was admitted from oncology clinic with reaction to chemo - had A fib with RVR. Also now has pneumonia. PMH includes multiple myeloma, MDS, bladder CA, prostate CA, A fib (not on anti-coagulation d/t lower GI bleed 1 year ago).    Precautions/Limitations fall precautions   General Info Comments Pt has 3 sons, none live in state. Son Bam is visiting from [place] until Sunday.   Cognitive Status Examination   Orientation orientation to person, place and time   Level of Consciousness alert   Follows Commands and Answers Questions 100% of the time;able to follow multistep instructions   Personal Safety and Judgment intact   Memory intact   Cognitive Comment appears cognitively intact   Pain Assessment   Patient Currently in Pain No   Integumentary/Edema  "  Integumentary/Edema no deficits were identifed   Posture    Posture Not impaired   Range of Motion (ROM)   ROM Quick Adds No deficits were identified   Strength   Strength Comments LE strength grossly 4/5   Bed Mobility   Bed Mobility Comments supine to/from sit with SBA   Transfer Skills   Transfer Comments sit to/from stand with SBA   Gait   Gait Comments Gait 50' with walker and CGA. Vital signs monitored throughout session and remained stable. Pt on 2L O2 via NC, sats at rest 96%, post gait 94%. No SOB noted. /80 midway through session, HR 70's.   Balance   Balance Comments mildly unsteady, needs walker for upright balance. sitting balance IND.   Sensory Examination   Sensory Perception no deficits were identified   Coordination   Coordination no deficits were identified   Muscle Tone   Muscle Tone no deficits were identified   General Therapy Interventions   Planned Therapy Interventions balance training;gait training;strengthening;transfer training   Clinical Impression   Criteria for Skilled Therapeutic Intervention yes, treatment indicated   PT Diagnosis impaired functional mobility   Influenced by the following impairments generalized deconditioning, decreased balance   Functional limitations due to impairments decreased IND with all mobility   Clinical Presentation Stable/Uncomplicated   Clinical Presentation Rationale per clinical judgement   Clinical Decision Making (Complexity) Low complexity   Therapy Frequency` daily   Predicted Duration of Therapy Intervention (days/wks) 5 days   Anticipated Equipment Needs at Discharge (pt has walker)   Anticipated Discharge Disposition Home with Home Therapy;Transitional Care Facility  (pending progress and LOS)   Risk & Benefits of therapy have been explained Yes   Patient, Family & other staff in agreement with plan of care Yes   [facility] AM-PAC TM \"6 Clicks\"   2016, Trustees of [facility], under license to [employer].  All rights " "reserved.   6 Clicks Short Forms Basic Mobility Inpatient Short Form   [facility] AM-PAC  \"6 Clicks\" V.2 Basic Mobility Inpatient Short Form   1. Turning from your back to your side while in a flat bed without using bedrails? 4 - None   2. Moving from lying on your back to sitting on the side of a flat bed without using bedrails? 3 - A Little   3. Moving to and from a bed to a chair (including a wheelchair)? 3 - A Little   4. Standing up from a chair using your arms (e.g., wheelchair, or bedside chair)? 3 - A Little   5. To walk in hospital room? 3 - A Little   6. Climbing 3-5 steps with a railing? 2 - A Lot   Basic Mobility Raw Score (Score out of 24.Lower scores equate to lower levels of function) 18   Total Evaluation Time   Total Evaluation Time (Minutes) 15[RB1.1]        Revision History        User Key Date/Time User Provider Type Action    > RB1.1 3/26/2018  1:25 PM Agustina Cardoso, PT Physical Therapist Sign            "

## 2018-03-22 NOTE — IP AVS SNAPSHOT
` ` Patient Information     Patient Name Sex     Roc Medellin ([id number]) Male 1926       Room Bed    [facility] 0253-01      Patient Demographics     Address Phone    [address]   [facility] [address] [phone] (Home)  [phone] (Mobile)      Patient Ethnicity & Race     Ethnic Group Patient Race    American White      Emergency Contact(s)     Name Relation Home Work Mobile    Elfego Medellin Brother [phone] NONE NONE    BAM MEDELLIN Son [phone]     Kelly Medellin (Bam's wife-if Bam not available) Other   [phone]    [place] CASTREJON) Relative   [phone]      Documents on File        Status Date Received Description       Documents for the Patient    Privacy Notice - [place] Received 08     Face Sheet Received () 09     Face Sheet Received () 07/05/10     External Medication Information Consent Accepted () 07/05/10     Patient ID Received 18     Consent for Services - Hospital/Clinic Received () 11     HIM LISA Authorization   release from patient 3/8/11    External Medication Information Consent Accepted () 12     Consent for Services - Hospital/Clinic Received () 12     Consent to Communicate Received () 12     Consent for EHR Access  13 Copied from existing Consent for services - C/HOD collected on 2012    [facility] Specified Other       Consent for Services - Hospital/Clinic Received () 13     Consent for EHR Access Received 13     External Medication Information Consent Accepted 13     Consent to Communicate Received 13     Insurance Card Received 18 [employer]    Insurance Card Received 18 Medicare    Consent for Services - Hospital/Clinic Received () 14     Consent for Services - Hospital/Clinic Received () 07/10/15     Consent for Services - Hospital/Clinic Received  () 07/10/15     Consent for Services/Privacy Notice - Hospital/Clinic Received () 16     Advance Directives and Living Will Received 16 POLST 2016    Consent for Services/Privacy Notice - Hospital/Clinic Received () 17     Business/Insurance/Care Coordination/Health Form - Patient  17 MEDICA-CYCLOPHOSPHAMIDE CAPSULE DENIED-2017    Physical Therapy Certification Received 17 through 17    Business/Insurance/Care Coordination/Health Form - Patient  17 [place] DEPARTMENT OF PUBLIC SAFETY APPLICATION FOR DISABILITY PARKING CERTIFICATE-17    Business/Insurance/Care Coordination/Health Form - Patient  17 [facility], MEDICATION LIST, 17    Care Everywhere Prospective Auth Received 10/18/17     Consent for Services/Privacy Notice - Hospital/Clinic Received 18     Insurance Card  (Deleted) 08     Insurance Card  (Deleted) 07/05/10     Insurance Card Received (Deleted) 11     Insurance Card Received (Deleted) 10/11/12     Insurance Card Received (Deleted) 10/11/12     Advance Directives and Living Will Not Received (Deleted)  Invalid - to be deleted       Documents for the Encounter    EMS/Ambulance Record  18 [place] FIRE DEPARTMENT    CMS IM for Patient Signature Received 18     CMS IM for Patient Signature Received (Deleted) 18       Admission Information     Attending Provider Admitting Provider Admission Type Admission Date/Time    Manjeet Dawson MD Omar, Ahmed A, MD Emergency 18  1317    Discharge Date Hospital Service Auth/Cert Status Service Area     Hospitalist Incomplete [facility] SERVICES    Unit Room/Bed Admission Status        CARDIAC SPEC CARE 0253/0253-01 Admission (Confirmed)       Admission     Complaint    Atrial fibrillation with RVR (H)      Hospital Account     Name Acct ID Class Status Primary Coverage    Roc Parkinson [id number] Inpatient Open  MEDICARE - MEDICARE FOR HB SUPPLEMENT            Guarantor Account (for Hospital Account #[id number])     Name Relation to Pt Service Area Active? Acct Type    Roc Parkinson  FCS Yes Personal/Family    Address Phone          [address] [address] [phone](H)              Coverage Information (for Hospital Account #[id number])     1. MEDICARE/MEDICARE FOR HB SUPPLEMENT     F/O Payor/Plan Precert #    MEDICARE/MEDICARE FOR HB SUPPLEMENT     Subscriber Subscriber #    Roc Parkinson [id number]    Address Phone    ATTN CLAIMS  [address]  [place] [address] [phone]          2. MEDICA/MEDICA PRIME SOLUTION     F/O Payor/Plan Precert #    MEDICA/MEDICA PRIME SOLUTION     Subscriber Subscriber #    Roc Parkinson [id number]    Address Phone    [address] [address]  [place] [address] [phone]

## 2018-03-22 NOTE — LETTER
3/22/2018         RE: Roc Parkinson  [address]   [facility] [id number]        Dear Colleague,    Thank you for referring your patient, Roc Parkinson, to the [facility]. Please see a copy of my visit note below.      Oncology/Hematology Visit Note  Mar 22, 2018    Reason for Visit: follow up of multiple myeloma     History of Present Illness: Roc Parkinson is a 89 year old male with multiple myeloma. He is currently undergoing treatment with Carfilzomib and dexamethasone started on 01/02/2018  And Zometa.  -March 7 myeloma labs show progression of disease  -Today she comes to the clinic for cycle 1 day 1 of Dartumumab/dexamethasone      - he has MDS - he is taking Aranesp 300 mcg SQ every three weeks  -hx of prostate cancer no recurrences since 1986     He comes in today for routine follow up prior to cycle     Interval History:    Prior infusion to  Dartumumab -patient was feeling okay denied cough shortness of breath chest pain heart palpitations.  Denies fever chills or sweats he denied nausea vomiting diarrhea or abdominal pain.  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    During the infusion of the Dartumumab  Cycle 1 day 1 patient developed cough shortness of breath, heart palpitations.  He was premedicated before the chemo with Tylenol, Benadryl and dexamethasone.  He was given PRN  medications including Solu-Medrol 125, epi and Demerol 12.5 mg.  EKG showed A. Fib.  Heart rate 140-150, Sats 98-97% on 2 L,    -911 called and patient was transported to the emergency room       Review of Systems:  14 point ROS of systems including Constitutional, Eyes, Respiratory, Cardiovascular, Gastroenterology, Genitourinary, Integumentary, Muscularskeletal, Psychiatric were all negative except for pertinent positives noted in my HPI.      Current Outpatient Prescriptions   Medication Sig Dispense Refill     LORazepam (ATIVAN) 0.5 MG tablet Take 1 tablet (0.5 mg) by  mouth every 4 hours as needed (Anxiety, Nausea/Vomiting or Sleep) 30 tablet 5     prochlorperazine (COMPAZINE) 10 MG tablet Take 1 tablet (10 mg) by mouth every 6 hours as needed (Nausea/Vomiting) 30 tablet 5     dexamethasone (DECADRON) 4 MG tablet Take 1 tablet (4 mg) by mouth daily (with breakfast) for 2 days following each daratumumab infusion 8 tablet 4     acyclovir (ZOVIRAX) 400 MG tablet Take 1 tablet (400 mg) by mouth 2 times daily Start 1 week prior to daratumumab and continue for 3 months after treatment is complete. 60 tablet 11     omeprazole (PRILOSEC) 20 MG CR capsule TAKE 1 CAPSULE BY MOUTH EVERY DAY 90 capsule 3     levothyroxine (SYNTHROID/LEVOTHROID) 25 MCG tablet TAKE 1 TABLET BY MOUTH EVERY DAY 90 tablet 0     NOVOLOG FLEXPEN 100 UNIT/ML soln INJECT 1 TO 5 UNITS UNDER THE SKIN FOUR TIMES DAILY WITH MEALS AND NIGHTLY 15 mL 1     FREESTYLE LITE test strip USE TO TEST FOUR TIMES DAILY 400 strip 0     insulin aspart (NOVOLOG FLEXPEN) 100 UNIT/ML injection Inject 4-17 units under the skin four times daily (with meals and nightly) 60 mL 1     INSULIN ASPART SC Inject Subcutaneous every morning Inject 6 unit subcutaneously one time a day for Diabetes II 100unit/mL before breakfast       INSULIN ASPART SC Inject Subcutaneous daily (before lunch) Inject 5 unit subcutaneously one time a day for Diabetes II before lunch       INSULIN ASPART SC Inject Subcutaneous At Bedtime Inject 4 unit subcutaneously at bedtime for Diabetes II Ok to use Humalog insulin with same order details       VITAMIN D, CHOLECALCIFEROL, PO Take 1,000 Units by mouth       metoprolol tartrate (LOPRESSOR) 25 MG tablet TAKE 1 TABLET BY MOUTH TWICE DAILY 180 tablet 0     tamsulosin (FLOMAX) 0.4 MG capsule Take 0.4 mg by mouth At Bedtime       Blood Glucose Monitoring Suppl MISC 3 times daily (before meals)        polyethylene glycol (MIRALAX/GLYCOLAX) Packet Take 17 g by mouth 2 times daily as needed (constipation) 7 packet      insulin  detemir (LEVEMIR FLEXPEN/FLEXTOUCH) 100 UNIT/ML injection Inject 16 Units Subcutaneous every morning (before breakfast) 15 mL 1     LISINOPRIL PO Take 15 mg by mouth daily       amLODIPine (NORVASC) 5 MG tablet TAKE 1 TABLET BY MOUTH DAILY 90 tablet 3     furosemide (LASIX) 20 MG tablet Take 1 tablet (20 mg) by mouth daily 90 tablet 1     gemfibrozil (LOPID) 600 MG tablet TAKE 1 TABLET BY MOUTH TWICE DAILY 180 tablet 1     [DISCONTINUED] cyclophosphamide (CYTOXAN) 50 MG capsule CHEMO Take 6 capsules (300 mg) by mouth once a week 24 capsule 0     B-D U/F 31G X 8 MM insulin pen needle USE 5 PEN NEEDLES PER DAY OR AS DIRECTED 500 each 1     FREESTYLE LITE test strip USE 1 STRIP TO TEST TWICE DAILY. 200 strip 1     B-D U/F 31G X 8 MM insulin pen needle        Ascorbic Acid (VITAMIN C PO) Take 1,000 mg by mouth daily       ferrous sulfate (IRON) 325 (65 FE) MG tablet Take 325 mg by mouth daily (with breakfast)       Selenium 200 MCG TABS Take 100 mcg by mouth daily. Pt takes one half tab of the 200 mcg daily          Physical Examination:  General: The patient is a pleasant male in no acute distress.  There were no vitals taken for this visit.     BP recheck 155/60    Wt Readings from Last 10 Encounters:   03/22/18 95.3 kg (210 lb)   03/22/18 95.3 kg (210 lb)   03/14/18 93.2 kg (205 lb 6.4 oz)   03/14/18 93.2 kg (205 lb 6.4 oz)   03/08/18 93.3 kg (205 lb 12.8 oz)   03/08/18 93.4 kg (205 lb 12.8 oz)   02/22/18 93.9 kg (206 lb 15.7 oz)   02/22/18 94 kg (207 lb 3.2 oz)   02/21/18 93 kg (205 lb)   02/20/18 93 kg (205 lb)     HEENT: EOMI, PERRL. Sclerae are anicteric. Oral mucosa is pink and moist with no lesions or thrush.   Lymph: Neck is supple with no lymphadenopathy in the cervical or supraclavicular areas.   Heart: Regular rate and rhythm.   Lungs: Clear to auscultation bilaterally.   GI: Bowel sounds present, soft, nontender with no palpable hepatosplenomegaly or masses.   Extremities: No lower extremity edema noted  bilaterally.   Skin: No rashes, petechiae, or bruising noted on exposed skin.    Laboratory Data:    Results for JACOB MEDELLIN (MRN [id number]) as of 3/22/2018 13:49   Ref. Range 3/22/2018 08:40   WBC Latest Ref Range: 4.0 - 11.0 10e9/L 3.6 (L)   Hemoglobin Latest Ref Range: 13.3 - 17.7 g/dL 7.7 (L)   Hematocrit Latest Ref Range: 40.0 - 53.0 % 24.6 (L)   Platelet Count Latest Ref Range: 150 - 450 10e9/L 178   RBC Count Latest Ref Range: 4.4 - 5.9 10e12/L 2.16 (L)   MCV Latest Ref Range: 78 - 100 fl 114 (H)   MCH Latest Ref Range: 26.5 - 33.0 pg 35.6 (H)   MCHC Latest Ref Range: 31.5 - 36.5 g/dL 31.3 (L)   RDW Latest Ref Range: 10.0 - 15.0 % 19.6 (H)   Diff Method Unknown Automated Method   % Neutrophils Latest Units: % 75.3   % Lymphocytes Latest Units: % 15.2   % Monocytes Latest Units: % 7.3   % Eosinophils Latest Units: % 0.8   % Basophils Latest Units: % 0.3   % Immature Granulocytes Latest Units: % 1.1   Nucleated RBCs Latest Ref Range: 0 /100 0   Absolute Neutrophil Latest Ref Range: 1.6 - 8.3 10e9/L 2.7   Absolute Lymphocytes Latest Ref Range: 0.8 - 5.3 10e9/L 0.5 (L)   Absolute Monocytes Latest Ref Range: 0.0 - 1.3 10e9/L 0.3   Absolute Eosinophils Latest Ref Range: 0.0 - 0.7 10e9/L 0.0   Absolute Basophils Latest Ref Range: 0.0 - 0.2 10e9/L 0.0   Abs Immature Granulocytes Latest Ref Range: 0 - 0.4 10e9/L 0.0   Absolute Nucleated RBC Unknown 0.0   ABO Unknown O   RH(D) Unknown Pos   Antibody Screen Unknown Neg   Test Valid Only At Latest Units:     [place] Amyl...   Specimen Expires Unknown 03/25/2018   Blood Component Type Unknown Red Blood Cells L...   Unit Number Unknown [id number]   Division Number Unknown [id number]   Status of Unit Unknown Ready for patient...   Crossmatch Unknown Red Blood Cells   Unit Status Unknown DANIELLE             Assessment and Plan:    This is a 89 y/o male with     Multiple myeloma   - kappa light chain   S/p   Velcade/dex and later  cyclophosphamide added to treatment   Developed neuropathy therefore Velcade  Discontinued. He  continued with weekly cyclophosphamide and dexamethasone. Disease progressed and he was  swished on Carfilzomib and dexamethasone on 01/02/2018   Unfortunately,  he progressed through the treatment.  Therefore, his treatment was changed to Dartumumab/dexamethasone.   He came to the clinic today for cycle 1 day 1 of the chemo.  Patient is also on Zometa every 12 weeks    During the infusion of the Dartumumab today cycle 1 day 1 , patient developed cough shortness of breath, cough heart palpitations.  He was premedicated before the chemo with Tylenol, Benadryl and dexamethasone.  He was given PRN  medications including Solu-Medrol 125, epi and Demerol 12.5 mg.  EKG showed A. Fib.  Heart rate 140-150, Sats 98-97% on 2 L, systolic blood pressure in the 130s-140 range, respirations 20.  Patient is taking metoprolol lisinopril and amlodipine.  -911 called and patient was transported to the emergency room         MDS   anemia stable   No need for transfusions  He will continue with Aranesp 300 mcg SQ every three weeks the next one to be given on March 14  Keep hemoglobin above 7.5       hx of prostate cancer no recurrences since 1986      Hypertension - stable for him   He is on amlodipine, lisinopril and metoprolol     -Patient was sent to the emergency room today where A. fib and shortness of breath.    ALEXANDER Salazar CNP  Hem/Onc   [facility]               Again, thank you for allowing me to participate in the care of your patient.        Sincerely,        ALEXANDER Salazar CNP

## 2018-03-22 NOTE — PROGRESS NOTES
1230:  O2 hovering around 90%, patient taking shorter breathes.  O2 applied via NC at 4L.  See epic for vitals. Alberto Nash RN

## 2018-03-22 NOTE — ED NOTES
Bed: ED22  Expected date:   Expected time:   Means of arrival:   Comments:  Ella - 89M allergic reaction eta [id number]

## 2018-03-22 NOTE — IP AVS SNAPSHOT
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  Start: 03/22/18 2100     2240 (400 mg)-Given        0832 (400 mg)-Given       2135 (400 mg)-Given        0917 (400 mg)-Given       2137 (400 mg)-Given        0959 (400 mg)-Given       2133 (400 mg)-Given        0817 (400 mg)-Given       2053 (400 mg)-Given        0945 (400 mg)-Given       [ ] 2100           amLODIPine (NORVASC) tablet 10 mg  Dose: 10 mg  Freq: DAILY Route: PO  Start: 03/26/18 0900         0817 (10 mg)-Given        0945 (10 mg)-Given           ascorbic acid (VITAMIN C) tablet 1,000 mg  Dose: 1,000 mg  Freq: DAILY Route: PO  Start: 03/23/18 0900      0832 (1,000 mg)-Given        0919 (1,000 mg)-Given        0958 (1,000 mg)-Given        0817 (1,000 mg)-Given        0943 (1,000 mg)-Given           cholecalciferol (vitamin D3) tablet 1,000 Units  Dose: 1,000 Units  Freq: DAILY Route: PO  Start: 03/23/18 0900      0831 (1,000 Units)-Given        0918 (1,000 Units)-Given        0959 (1,000 Units)-Given        0817 (1,000 Units)-Given        0942 (1,000 Units)-Given           enoxaparin (LOVENOX) injection 40 mg  Dose: 40 mg  Freq: EVERY 24 HOURS Route: SC  Start: 03/25/18 1630        1639 (40 mg)-Given        1640 (40 mg)-Given        [ ] 1630           ferrous sulfate (IRON) tablet 325 mg  Dose: 325 mg  Freq: DAILY WITH BREAKFAST Route: PO  Start: 03/23/18 0900   Admin Instructions: Absorbed best on an empty stomach. If stomach upset occurs, can take with meals.       0833 (325 mg)-Given        0918 (325  mg)-Given        (1325)-Not Given [C]        0817 (325 mg)-Given        0945 (325 mg)-Given           gemfibrozil (LOPID) tablet 600 mg  Dose: 600 mg  Freq: 2 TIMES DAILY Route: PO  Start: 03/23/18 0900   Admin Instructions: Take before meals.       0831 (600 mg)-Given       2135 (600 mg)-Given        0918 (600 mg)-Given       2137 (600 mg)-Given        0958 (600 mg)-Given       2133 (600 mg)-Given        0817 (600 mg)-Given       2053 (600 mg)-Given        0946 (600 mg)-Given       [ ] 2100           glucose 40 % gel 15-30 g  Dose: 15-30 g  Freq: EVERY 15 MIN PRN Route: PO  PRN Reason: low blood sugar  Start: 03/22/18 2151   Admin Instructions: Give 15 g for BG 51 to 69 mg/dL IF patient is conscious and able to swallow. Give 30 g for BG less than or equal to 50 mg/dL IF patient is conscious and able to swallow. Do NOT give glucose gel via enteral tube.  IF patient has enteral tube: give apple juice 120 mL (4 oz or 15 g of CHO) via enteral tube for BG 51 to 69 mg/dL.  Give apple juice 240 mL (8 oz or 30 g of CHO) via enteral tube for BG less than or equal to 50 mg/dL.    ~Oral gel is preferable for conscious and able to swallow patient.   ~IF gel unavailable or patient refuses may provide apple juice 120 mL (4 oz or 15 g of CHO). Document juice on I and O flowsheet.              Or  dextrose 50 % injection 25-50 mL  Dose: 25-50 mL  Freq: EVERY 15 MIN PRN Route: IV  PRN Reason: low blood sugar  Start: 03/22/18 2151   Admin Instructions: Use if have IV access, BG less than 70 mg/dL and meet dose criteria below:  Dose if conscious and alert (or disorientated) and NPO = 25 mL  Dose if unconscious / not alert = 50 mL  Vesicant. For ordered doses up to 25 g, give IV Push undiluted. Give each 5g over 1 minute.              Or  glucagon injection 1 mg  Dose: 1 mg  Freq: EVERY 15 MIN PRN Route: SC  PRN Reason: low blood sugar  PRN Comment: May repeat x 1 only  Start: 03/22/18 2151   Admin Instructions: May give SQ or IM.  ONLY use glucagon IF patient has NO IV access AND is UNABLE to swallow AND blood glucose is LESS than or EQUAL to 50 mg/dL.  Give IV Push over 1 minute. Reconstitute with 1mL sterile water.               guaiFENesin-dextromethorphan (ROBITUSSIN DM) 100-10 MG/5ML syrup 5 mL  Dose: 5 mL  Freq: EVERY 6 HOURS PRN Route: PO  PRN Reasons: cough,congestion  Start: 03/25/18 1244              insulin aspart (NovoLOG) inj (RAPID ACTING)  Dose: 1-7 Units  Freq: AT BEDTIME Route: SC  Start: 03/23/18 2200   Admin Instructions: HIGH INSULIN RESISTANCE DOSING    Do Not give Bedtime Correction Insulin if BG less than 200.   For  - 224 give 1 units.   For  - 249 give 2 units.   For  - 274 give 3 units.   For  - 299 give 4 units.   For  - 324 give 5 units.   For  - 349 give 6 units.   For BG greater than or equal to 350 give 7 units.   Notify provider if glucose greater than or equal to 350 mg/dL after administration of correction dose.  If given at mealtime, must be administered 5 min before meal or immediately after.       2135 (2 Units)-Given        2137 (2 Units)-Given [C]        2146 (3 Units)-Given [C]        (2156)-Not Given [C]        [ ] 2200           insulin aspart (NovoLOG) inj (RAPID ACTING)  Dose: 1-10 Units  Freq: 3 TIMES DAILY BEFORE MEALS Route: SC  Start: 03/23/18 1215   Admin Instructions: Correction Scale - HIGH INSULIN RESISTANCE DOSING     Do Not give Correction Insulin if Pre-Meal BG less than 140.   For Pre-Meal  - 164 give 1 unit.   For Pre-Meal  - 189 give 2 units.   For Pre-Meal  - 214 give 3 units.   For Pre-Meal  - 239 give 4 units.   For Pre-Meal  - 264 give 5 units.   For Pre-Meal  - 289 give 6 units.   For Pre-Meal  - 314 give 7 units.   For Pre-Meal  - 339 give 8 units.   For Pre-Meal  - 364 give 9 units.   For Pre-Meal BG greater than or equal to 365 give 10 units  To be given with prandial insulin, and based on  pre-meal blood glucose.   Notify provider if glucose greater than or equal to 350 mg/dL after administration of correction dose.  If given at mealtime, must be administered 5 min before meal or immediately after.       1218 (8 Units)-Given       1744 (6 Units)-Given [C]        0852 (1 Units)-Given [C]       1234 (4 Units)-Given [C]       1738 (5 Units)-Given [C]        (0850)-Not Given       (1325)-Not Given       1542 (3 Units)-Given [C]       (1700)-Not Given [C]        (0816)-Not Given       1228 (6 Units)-Given       1716 (6 Units)-Given        (0815)-Not Given       1230 (5 Units)-Given       [ ] 1700           levalbuterol (XOPENEX CONC) neb solution 1.25 mg  Dose: 1.25 mg  Freq: EVERY 6 HOURS PRN Route: NEBULIZATION  PRN Reasons: wheezing,shortness of breath / dyspnea  Start: 03/25/18 1248   Order specific questions:  Levalbuterol orders will be substituted to albuterol unless intolerance is documented here Tachycardia          1619 (1.25 mg)-Given             levofloxacin (LEVAQUIN) tablet 500 mg  Dose: 500 mg  Freq: EVERY 24 HOURS Route: PO  Indications of Use: COMMUNITY ACQUIRED PNEUMONIA  Start: 03/27/18 1300   Admin Instructions: Administer at least 2 hrs before or 4 hrs after iron aluminum, calcium, iron, zinc or magnesium containing products.           1231 (500 mg)-Given           levothyroxine (SYNTHROID/LEVOTHROID) tablet 25 mcg  Dose: 25 mcg  Freq: DAILY Route: PO  Start: 03/23/18 0900   Admin Instructions: Separate oral administration of iron- or calcium-containing products and levothyroxine by at least 4 hours.       0831 (25 mcg)-Given        0918 (25 mcg)-Given        0958 (25 mcg)-Given        0817 (25 mcg)-Given        0944 (25 mcg)-Given           LORazepam (ATIVAN) tablet 0.5 mg  Dose: 0.5 mg  Freq: EVERY 4 HOURS PRN Route: PO  PRN Comment: Anxiety, Nausea/Vomiting or Sleep  Start: 03/22/18 1719              magnesium sulfate 4 g in 100 mL sterile water (premade)  Dose: 4 g  Freq: EVERY 4  HOURS PRN Route: IV  PRN Reason: magnesium supplementation  Start: 03/22/18 1719   Admin Instructions: For serum Mg++ less than 1.6 mg/dL  Give 4 g and recheck magnesium level 2 hours after dose, and next AM.               melatonin tablet 1 mg  Dose: 1 mg  Freq: AT BEDTIME PRN Route: PO  PRN Reason: sleep  Start: 03/22/18 1719   Admin Instructions: Do not give unless at least 6 hours of uninterrupted sleep is expected.               metoprolol succinate (TOPROL-XL) 24 hr tablet 50 mg  Dose: 50 mg  Freq: DAILY Route: PO  Start: 03/24/18 0900   Admin Instructions: DO NOT CRUSH. Tablet may be split in half along score line.        0919 (50 mg)-Given        0956 (50 mg)-Given        0817 (50 mg)-Given        0944 (50 mg)-Given           naloxone (NARCAN) injection 0.1-0.4 mg  Dose: 0.1-0.4 mg  Freq: EVERY 2 MIN PRN Route: IV  PRN Reason: opioid reversal  Start: 03/22/18 1719   Admin Instructions: For respiratory rate LESS than or EQUAL to 8.  Partial reversal dose:  0.1 mg titrated q 2 minutes for Analgesia Side Effects Monitoring Sedation Level of 3 (frequently drowsy, arousable, drifts to sleep during conversation).Full reversal dose:  0.4 mg bolus for Analgesia Side Effects Monitoring Sedation Level of 4 (somnolent, minimal or no response to stimulation).  For ordered doses up to 2mg give IVP. Give each 0.4mg over 15 seconds in emergency situations. For non-emergent situations further dilute in 9mL of NS to facilitate titration of response.               omeprazole (priLOSEC) CR capsule 20 mg  Dose: 20 mg  Freq: EVERY MORNING Route: PO  Start: 03/23/18 0900      0832 (20 mg)-Given        0918 (20 mg)-Given        0959 (20 mg)-Given        0817 (20 mg)-Given        0947 (20 mg)-Given           Patient is already receiving anticoagulation with heparin, enoxaparin (LOVENOX), warfarin (COUMADIN)  or other anticoagulant medication  Freq: CONTINUOUS PRN Route: XX  Start: 03/22/18 1719              polyethylene glycol  (MIRALAX/GLYCOLAX) Packet 17 g  Dose: 17 g  Freq: 2 TIMES DAILY PRN Route: PO  PRN Comment: constipation  Start: 03/22/18 1719   Admin Instructions: 1 Packet = 17 grams. Mixed prescribed dose in 8 ounces of water. Follow with 8 oz. of water.               potassium chloride (KLOR-CON) Packet 20-40 mEq  Dose: 20-40 mEq  Freq: EVERY 2 HOURS PRN Route: ORAL OR FEED  PRN Reason: potassium supplementation  Start: 03/22/18 1719   Admin Instructions: Use if unable to tolerate tablets.  If Serum K+ 3.0-3.3, dose = 60 mEq po total dose (40 mEq x1 followed in 2 hours by 20 mEq x1). Recheck K+ level 4 hours after dose and the next AM.  If Serum K+ 2.5-2.9, dose = 80 mEq po total dose (40 mEq Q2H x2). Recheck K+ level 4 hours after dose and the next AM.  If Serum K+ less than 2.5, See IV order.  Dissolve packet contents in 4-8 ounces of cold water or juice.               potassium chloride 10 mEq in 100 mL intermittent infusion with 10 mg lidocaine  Dose: 10 mEq  Freq: EVERY 1 HOUR PRN Route: IV  PRN Reason: potassium supplementation  Start: 03/22/18 1719   Admin Instructions: Infuse via PERIPHERAL LINE. Use potassium with lidocaine for pain with peripheral administration.  If Serum K+ 3.0-3.3, dose = 10 mEq/hr x4 doses (40 mEq IV total dose). Recheck K+ level 2 hours after dose and the next AM.  If Serum K+ less than 3.0, dose = 10 mEq/hr x6 doses (60 mEq IV total dose). Recheck K+ level 2 hours after dose and the next AM.               potassium chloride 10 mEq in 100 mL sterile water intermittent infusion (premix)  Dose: 10 mEq  Freq: EVERY 1 HOUR PRN Route: IV  PRN Reason: potassium supplementation  Start: 03/22/18 1719   Admin Instructions: Infuse via PERIPHERAL LINE or CENTRAL LINE. Use for central line replacement if patient weight less than 65 kg, if patient is on TPN with high potassium content or if unit does not stock 20 mEq bags.   If Serum K+ 3.0-3.3, dose = 10 mEq/hr x4 doses (40 mEq IV total dose). Recheck K+  level 2 hours after dose and the next AM.   If Serum K+ less than 3.0, dose = 10 mEq/hr x6 doses (60 mEq IV total dose). Recheck K+ level 2 hours after dose and the next AM.               potassium chloride 20 mEq in 50 mL intermittent infusion  Dose: 20 mEq  Freq: EVERY 1 HOUR PRN Route: IV  PRN Reason: potassium supplementation  Start: 03/22/18 1719   Admin Instructions: Infuse via CENTRAL LINE Only. May need EKG if less than 65 kg or on TPN - Max rate is 0.3 mEq/kg/hr for patients not on EKG monitoring.   If Serum K+ 3.0-3.3, dose = 20 mEq/hr x2 doses (40 mEq IV total dose). Recheck K+ level 2 hours after dose and the next AM.  If Serum K+ less than 3.0, dose = 20 mEq/hr x3 doses (60 mEq IV total dose). Recheck K+ level 2 hours after dose and the next AM.               potassium chloride SA (K-DUR/KLOR-CON M) CR tablet 20-40 mEq  Dose: 20-40 mEq  Freq: EVERY 2 HOURS PRN Route: PO  PRN Reason: potassium supplementation  Start: 03/22/18 1719   Admin Instructions: Use if able to take PO.   If Serum K+ 3.0-3.3, dose = 60 mEq po total dose (40 mEq x1 followed in 2 hours by 20 mEq x1). Recheck K+ level 4 hours after dose and the next AM.  If Serum K+ 2.5-2.9, dose = 80 mEq po total dose (40 mEq Q2H x2). Recheck K+ level 4 hours after dose and the next AM.  If Serum K+ less than 2.5, See IV order.  DO NOT CRUSH               prochlorperazine (COMPAZINE) tablet 5 mg  Dose: 5 mg  Freq: EVERY 6 HOURS PRN Route: PO  PRN Comment: Nausea/Vomiting  Start: 03/22/18 1719              senna-docusate (SENOKOT-S;PERICOLACE) 8.6-50 MG per tablet 2 tablet  Dose: 2 tablet  Freq: DAILY PRN Route: PO  PRN Reason: constipation  Start: 03/25/18 1113              sodium chloride (OCEAN) 0.65 % nasal spray 1-2 spray  Dose: 1-2 spray  Freq: EVERY 1 HOUR PRN Route: NA  PRN Reason: other  PRN Comment: dry nasal passages  Start: 03/25/18 9604   Admin Instructions: To affected nostril(s)               sodium chloride (PF) 0.9% PF flush 3  mL  Dose: 3 mL  Freq: EVERY 8 HOURS Route: IK  Start: 03/23/18 0845   Admin Instructions: And Q1H PRN, to lock peripheral IV dormant line.       0836 (3 mL)-Given       2136 (3 mL)-Given        0450 (3 mL)-Given       0917 (3 mL)-Given               1000 (3 mL)-Given                      0300 (3 mL)-Given       0818 (3 mL)-Given       (1559)-Not Given       2156 (3 mL)-Given               1046 (3 mL)-Given       [ ] 1645           sodium chloride (PF) 0.9% PF flush 3 mL  Dose: 3 mL  Freq: EVERY 1 HOUR PRN Route: IK  PRN Reason: line flush  PRN Comment: for peripheral IV flush post IV meds  Start: 03/23/18 0834        1526 (3 mL)-Given       1635 (3 mL)-Given            Completed Medications  Medications 03/21/18 03/22/18 03/23/18 03/24/18 03/25/18 03/26/18 03/27/18         Dose: 5 mg  Freq: ONCE Route: PO  Start: 03/25/18 1430   End: 03/25/18 1530   Admin Instructions: Hold for SBP < 100         1530 (5 mg)-Given               Dose: 40 mg  Freq: ONCE Route: IV  Start: 03/25/18 1600   End: 03/25/18 1633   Admin Instructions: For ordered doses up to 40 mg, give IV Push undiluted over 1-2 minutes.         1632 (40 mg)-Given               Dose: 9 mL  Freq: ONCE Route: IV  Start: 03/26/18 1115   End: 03/26/18 1101         1101 (9 mL)-Given              Dose: 10 mL  Freq: ONCE Route: IV  Start: 03/26/18 1115   End: 03/26/18 1102         1102 (10 mL)-Given              Dose: 1,750 mg  Freq: EVERY 24 HOURS Route: IV  Indications of Use: HEALTHCARE-ASSOCIATED PNEUMONIA  Start: 03/25/18 1430   End: 03/26/18 1659   Admin Instructions: Vesicant.    For peripheral catheter: If dose between 2-2.5 g, infuse over 2 hours.    For central catheter: If dose is below 2 g, dose may be infused over 1 hour.         1754 (1,750 mg)-New Bag        1459 (1,750 mg)-New Bag           Discontinued Medications  Medications 03/21/18 03/22/18 03/23/18 03/24/18 03/25/18 03/26/18 03/27/18         Dose: 5 mg  Freq: DAILY Route: PO  Start: 03/23/18  0900   End: 03/25/18 1338      0832 (5 mg)-Given        0918 (5 mg)-Given        0957 (5 mg)-Given       1338-Med Discontinued           Dose: 4 mg  Freq: DAILY WITH BREAKFAST Route: PO  Start: 03/23/18 0900   End: 03/26/18 1609      0832 (4 mg)-Given        0918 (4 mg)-Given        0958 (4 mg)-Given        0817 (4 mg)-Given       1609-Med Discontinued          Rate: 5-15 mL/hr Dose: 5-15 mg/hr  Freq: CONTINUOUS Route: IV  Last Dose: Stopped (03/22/18 9326)  Start: 03/22/18 1502   End: 03/25/18 1245   Admin Instructions: For range orders: start at lowest dose ordered. Titrate by 5 mg/hr every 2 hours to a HR less than 100 beats per minute,  while still maintaining a SBP greater than 100 mmHg.  Irritant.      1535 (5 mg/hr)-New Bag       1629-ED Infusing on Admission/transfer       1704 (10 mg/hr)-New Bag       2356-Stopped [C]          1245-Med Discontinued           Dose: 20 Units  Freq: EVERY MORNING BEFORE BREAKFAST Route: SC  Start: 03/24/18 0730   End: 03/25/18 1338       0852 (20 Units)-Given        0959 (20 Units)-Given       1338-Med Discontinued           Dose: 22 Units  Freq: EVERY MORNING BEFORE BREAKFAST Route: SC  Start: 03/26/18 0730   End: 03/27/18 0832         0827 (22 Units)-Given        0832-Med Discontinued  (0838)-Not Given             Dose: 1.25 mg  Freq: EVERY 6 HOURS PRN Route: NEBULIZATION  PRN Reasons: wheezing,shortness of breath / dyspnea  Start: 03/25/18 1242   End: 03/25/18 1249   Order specific questions:  Levalbuterol orders will be substituted to albuterol unless intolerance is documented here Tachycardia          1249-Med Discontinued           Dose: 500 mg  Freq: EVERY 24 HOURS Route: IV  Indications of Use: HEALTHCARE-ASSOCIATED PNEUMONIA  Last Dose: 500 mg (03/26/18 1327)  Start: 03/25/18 1400   End: 03/27/18 1059   Admin Instructions: Irritant. Administer at a rate of no greater than 100 mL/hr         1637 (500 mg)-New Bag        1327 (500 mg)-New Bag        1059-Med Discontinued          Dose: 500 mg  Freq: DAILY Route: PO  Indications of Use: COMMUNITY ACQUIRED PNEUMONIA  Start: 03/27/18 1100   End: 03/27/18 1106   Admin Instructions: Administer at least 2 hrs before or 4 hrs after aluminum, calcium, iron, zinc or magnesium containing products.           1106-Med Discontinued  (1232)-Not Given             Dose: 3.375 g  Freq: EVERY 6 HOURS Route: IV  Indications of Use: ASPIRATION PNEUMONIA  Start: 03/25/18 1300   End: 03/25/18 1326        1326-Med Discontinued  (1700)-Not Given               Dose: 4.5 g  Freq: EVERY 6 HOURS Route: IV  Indications of Use: HEALTHCARE-ASSOCIATED PNEUMONIA  Start: 03/25/18 1500   End: 03/27/18 1059   Admin Instructions: Dose adjusted per renal dosing policy.  Estimated CrCl = >40 mL/min. <br>         1525 (4.5 g)-New Bag       2133 (4.5 g)-New Bag        0300 (4.5 g)-New Bag       0816 (4.5 g)-New Bag       1435 (4.5 g)-New Bag       2054 (4.5 g)-New Bag        0256 (4.5 g)-New Bag       0938 (4.5 g)-New Bag       1059-Med Discontinued    Medications 03/21/18 03/22/18 03/23/18 03/24/18 03/25/18 03/26/18 03/27/18

## 2018-03-22 NOTE — ED NOTES
[facility]  ED Nurse Handoff Report    ED Chief complaint: Allergic Reaction (@ clinic receiving new chemo med today, reports after 1.5 hours into infusion patient reported gen. weakness and fast HR ( afib - has hx) given allergic reaction meds, patient felt better but still in a fib with RVR)      ED Diagnosis:   Final diagnoses:   Atrial fibrillation with rapid ventricular response (H)       Code Status: DNR / DNI    Allergies: No Known Allergies    Activity level - Baseline/Home:  Independent    Activity Level - Current:   Stand with Assist     Needed?: No    Isolation: Yes  Infection:   ESBL    Bariatric?: No    Vital Signs:   Vitals:    03/22/18 1445 03/22/18 1500 03/22/18 1515 03/22/18 1530   BP: 136/85 127/88 128/82 129/81   Resp: 19 21 18   Temp:       TempSrc:       SpO2: 95% 96%  96%   Weight:       Height:           Cardiac Rhythm: ,   Cardiac  Cardiac Rhythm: Atrial fibrillation    Pain level: 0-10 Pain Scale: 0    Is this patient confused?: No     Patient Report: Initial Complaint: allergic reaction   Focused Assessment: Patient came from infusion clinic after receiving a new chemo med today for his multiple myeloma.  Clinic reported patient developed weakness, dizziness, and a fib with RVR.  Patient has hx of a fib. But wasn't tachy on arrival to clinic.  Clinic gave patient medications to treat and patient felt improved on arrival to ED.  Patient continued to be in a fib with RVR, initially HR 140s-160s.  Gave first 10mg dilt. Bolus without changed and improved after 2nd bolus to 110s.  Started on dilt. Gtt. First lactic acid slightly elevated, pending repeat. Patient denies CP, SOB.  A/o x3.  Son at bedside.  Patient does report to have an appt. At 8AM tomorrow for next infusion.   Tests Performed: blood work  Abnormal Results:   Treatments provided: 1L NS, dilt    Family Comments: son Bam @ bedside    OBS brochure/video discussed/provided to patient: No    ED  Medications:   Medications   0.9% sodium chloride BOLUS (1,000 mLs Intravenous New Bag 3/22/18 1413)     Followed by   sodium chloride 0.9% infusion (not administered)   diltiazem (CARDIZEM) 125 mg in sodium chloride 0.9 % 125 mL infusion (5 mg/hr Intravenous New Bag 3/22/18 1535)   diltiazem (CARDIZEM) injection 10 mg (10 mg Intravenous Given 3/22/18 1413)   diltiazem (CARDIZEM) injection 10 mg (10 mg Intravenous Given 3/22/18 1516)       Drips infusing?:  Yes    For the majority of the shift this patient was Green.   Interventions performed were .    Severe Sepsis OR Septic Shock Diagnosis Present: No      ED NURSE PHONE NUMBER: [phone]

## 2018-03-22 NOTE — PROGRESS NOTES
RECEIVING UNIT ED HANDOFF REVIEW    ED Nurse Handoff Report was reviewed by: Jam Duff on March 22, 2018 at 4:01 PM

## 2018-03-22 NOTE — MR AVS SNAPSHOT
After Visit Summary   3/22/2018    Roc Parkinson    MRN: [id number]           Patient Information     Date Of Birth          7/7/1926        Visit Information        Provider Department      3/22/2018 10:00 AM Joey Campuzano APRN CNP [facility]        Today's Diagnoses     Multiple myeloma not having achieved remission (H)    -  1       Follow-ups after your visit        Your next 10 appointments already scheduled     Mar 23, 2018  8:00 AM CDT   Level 6 with SH INFUSION CHAIR 5   [facility] ([facility])    [facility]  [address]  [place] [address]   [phone]            Mar 29, 2018  8:00 AM CDT   Level 6 with SH INFUSION CHAIR 5   [facility] ([facility])    [facility]  [address]  [place] [address]   [phone]            Mar 29, 2018 10:00 AM CDT   Return Visit with ALEXANDER Salazar CNP   [facility] ([facility])    [facility]  [address]  [place] [address]   [phone]            Apr 05, 2018  7:30 AM CDT   Level 6 with SH INFUSION CHAIR 1   [facility] ([facility])    [facility]  [address]  [place] [address]   [phone]            Apr 11, 2018  8:00 AM CDT   Level 6 with SH INFUSION CHAIR 6   [facility] ([facility])    [facility]  [address]  [place] [address]   [phone]            Apr 18, 2018  8:00 AM CDT   Level 6 with SH INFUSION CHAIR 4   [facility] ([facility])    [facility]  [address]  [place] [address]   [phone]            Apr 25, 2018  8:00 AM CDT   Level 6 with  SH INFUSION CHAIR 6   [facility] ([facility])    [facility]  [address]  [place] [address]   [phone]            Apr 25, 2018  8:45 AM CDT   Return Visit with Gretel Quinn MD   [facility] ([facility])    [facility]  [address]  [place] [address]   [phone]            May 09, 2018  8:00 AM CDT   Level 6 with  INFUSION CHAIR 5   [facility] ([facility])    [facility]  [address]  [place] [address]   [phone]            May 16, 2018  8:00 AM CDT   Level 6 with  INFUSION CHAIR 6   [facility] ([facility])    [facility] [place] [id number]  [place] [address]   [phone]              Future tests that were ordered for you today     Open Standing Orders        Priority Remaining Interval Expires Ordered    Red blood cell prepare order unit Routine 99/100 CONDITIONAL (SPECIFY) BLOOD  3/22/2018    Transfuse red blood cell unit Routine 99/100 TRANSFUSE 1 UNIT  3/22/2018            Who to contact     If you have questions or need follow up information about today's clinic visit or your schedule please contact [facility] directly at [phone].  Normal or non-critical lab and imaging results will be communicated to you by Codemastershart, letter or phone within 4 business days after the clinic has received the results. If you do not hear from us within 7 days, please contact the clinic through Codemastershart or phone. If you have a critical or abnormal lab result, we will notify you by phone as soon as possible.  Submit refill requests through [employer]aura or call your pharmacy and they will forward the refill request to us. Please allow 3 business days for your refill to be completed.           "Additional Information About Your Visit        MyChart Information     Ensequence lets you send messages to your doctor, view your test results, renew your prescriptions, schedule appointments and more. To sign up, go to [email or web address] . Click on \"Log in\" on the left side of the screen, which will take you to the Welcome page. Then click on \"Sign up Now\" on the right side of the page.     You will be asked to enter the access code listed below, as well as some personal information. Please follow the directions to create your username and password.     Your access code is: [email or web address]  Expires: 3/26/2018 12:10 PM     Your access code will  in 90 days. If you need help or a new code, please call your [place] clinic or [phone].        Care EveryWhere ID     This is your Care EveryWhere ID. This could be used by other organizations to access your [place] medical records  [id number]         Blood Pressure from Last 3 Encounters:   18 108/84   18 149/85   18 110/60    Weight from Last 3 Encounters:   18 95.3 kg (210 lb)   18 95.3 kg (210 lb)   18 93.2 kg (205 lb 6.4 oz)              Today, you had the following     No orders found for display         Today's Medication Changes          These changes are accurate as of 3/22/18  1:56 PM.  If you have any questions, ask your nurse or doctor.               Start taking these medicines.        Dose/Directions    acyclovir 400 MG tablet   Commonly known as:  ZOVIRAX   Used for:  Multiple myeloma not having achieved remission (H)        Dose:  400 mg   Take 1 tablet (400 mg) by mouth 2 times daily Start 1 week prior to daratumumab and continue for 3 months after treatment is complete.   Quantity:  60 tablet   Refills:  11       dexamethasone 4 MG tablet   Commonly known as:  DECADRON   Used for:  Multiple myeloma not having achieved remission (H)        Dose:  4 mg   Take 1 tablet (4 mg) by mouth daily (with breakfast) " for 2 days following each daratumumab infusion   Quantity:  8 tablet   Refills:  4       LORazepam 0.5 MG tablet   Commonly known as:  ATIVAN   Used for:  Multiple myeloma not having achieved remission (H)        Dose:  0.5 mg   Take 1 tablet (0.5 mg) by mouth every 4 hours as needed (Anxiety, Nausea/Vomiting or Sleep)   Quantity:  30 tablet   Refills:  5            Where to get your medicines      These medications were sent to [id number] - [facility] [address] AT [facility] [address], [facility] [address]    Hours:  24-hours Phone:  [phone]     acyclovir 400 MG tablet    dexamethasone 4 MG tablet         Some of these will need a paper prescription and others can be bought over the counter.  Ask your nurse if you have questions.     Bring a paper prescription for each of these medications     LORazepam 0.5 MG tablet                Primary Care Provider Office Phone # Fax #    Dickson Reich [phone] [address]  [facility] [address]        Equal Access to Services     ELIE GAGNON AH: Hadii virginia hsieh Soaimee, waaxda luwhitney, qaybta kaalprakash thomason, melita rodriguez. So [facility] [phone].    ATENCIÓN: Si habla español, tiene a nolan disposición servicios gratCarlsbad Medical Centeros de asistencia lingüística. [facility] [phone].    We comply with applicable federal civil rights laws and [place] laws. We do not discriminate on the basis of race, color, national origin, age, disability, sex, sexual orientation, or gender identity.            Thank you!     Thank you for choosing [facility]  for your care. Our goal is always to provide you with excellent care. Hearing back from our patients is one way we can continue to improve our services. Please take a few minutes to complete the written survey that you may receive in the mail after your visit with us. Thank you!             Your Updated  Medication List - Protect others around you: Learn how to safely use, store and throw away your medicines at www.disposemymeds.org.          This list is accurate as of 3/22/18  1:56 PM.  Always use your most recent med list.                   Brand Name Dispense Instructions for use Diagnosis    acyclovir 400 MG tablet    ZOVIRAX    60 tablet    Take 1 tablet (400 mg) by mouth 2 times daily Start 1 week prior to daratumumab and continue for 3 months after treatment is complete.    Multiple myeloma not having achieved remission (H)       amLODIPine 5 MG tablet    NORVASC    90 tablet    TAKE 1 TABLET BY MOUTH DAILY    Essential hypertension, benign       * B-D U/F 31G X 8 MM   Generic drug:  insulin pen needle           * B-D U/F 31G X 8 MM   Generic drug:  insulin pen needle     500 each    USE 5 PEN NEEDLES PER DAY OR AS DIRECTED    Type 2 diabetes mellitus with stage 3 chronic kidney disease, with long-term current use of insulin (H)       Blood Glucose Monitoring Suppl Misc      3 times daily (before meals)        dexamethasone 4 MG tablet    DECADRON    8 tablet    Take 1 tablet (4 mg) by mouth daily (with breakfast) for 2 days following each daratumumab infusion    Multiple myeloma not having achieved remission (H)       ferrous sulfate 325 (65 FE) MG tablet    IRON     Take 325 mg by mouth daily (with breakfast)        FLOMAX 0.4 MG capsule   Generic drug:  tamsulosin      Take 0.4 mg by mouth At Bedtime        * FREESTYLE LITE test strip   Generic drug:  blood glucose monitoring     200 strip    USE 1 STRIP TO TEST TWICE DAILY.    Uncontrolled type 1 diabetes mellitus with stage 3 chronic kidney disease (H)       * FREESTYLE LITE test strip   Generic drug:  blood glucose monitoring     400 strip    USE TO TEST FOUR TIMES DAILY    Uncontrolled type 1 diabetes mellitus with stage 3 chronic kidney disease (H)       furosemide 20 MG tablet    LASIX    90 tablet    Take 1 tablet (20 mg) by mouth daily    Multiple  myeloma not having achieved remission (H)       gemfibrozil 600 MG tablet    LOPID    180 tablet    TAKE 1 TABLET BY MOUTH TWICE DAILY    Hyperlipidemia       * INSULIN ASPART SC      Inject Subcutaneous every morning Inject 6 unit subcutaneously one time a day for Diabetes II 100unit/mL before breakfast        * INSULIN ASPART SC      Inject Subcutaneous daily (before lunch) Inject 5 unit subcutaneously one time a day for Diabetes II before lunch        * INSULIN ASPART SC      Inject Subcutaneous At Bedtime Inject 4 unit subcutaneously at bedtime for Diabetes II Ok to use Humalog insulin with same order details        * insulin aspart 100 UNIT/ML injection    NovoLOG FLEXPEN    60 mL    Inject 4-17 units under the skin four times daily (with meals and nightly)    Uncontrolled type 2 diabetes mellitus with hyperglycemia, unspecified long term insulin use status (H), Type 2 diabetes mellitus with diabetic chronic kidney disease, unspecified CKD stage, unspecified long term insulin use status (H), Type 2 diabetes mellitus with stage 3 chronic kidney disease, with long-term current use of insulin (H)       * NovoLOG FLEXPEN 100 UNIT/ML injection   Generic drug:  insulin aspart     15 mL    INJECT 1 TO 5 UNITS UNDER THE SKIN FOUR TIMES DAILY WITH MEALS AND NIGHTLY    Uncontrolled type 2 diabetes mellitus with hyperglycemia, unspecified long term insulin use status (H)       insulin detemir 100 UNIT/ML injection    LEVEMIR FLEXPEN/FLEXTOUCH    15 mL    Inject 16 Units Subcutaneous every morning (before breakfast)    Type 2 diabetes mellitus with diabetic chronic kidney disease, unspecified CKD stage, unspecified long term insulin use status (H), Uncontrolled type 2 diabetes mellitus with hyperglycemia, unspecified long term insulin use status (H), Type 2 diabetes mellitus with stage 3 chronic kidney disease, with long-term current use of insulin (H)       levothyroxine 25 MCG tablet    SYNTHROID/LEVOTHROID    90 tablet     TAKE 1 TABLET BY MOUTH EVERY DAY    Acquired hypothyroidism       LISINOPRIL PO      Take 15 mg by mouth daily        LORazepam 0.5 MG tablet    ATIVAN    30 tablet    Take 1 tablet (0.5 mg) by mouth every 4 hours as needed (Anxiety, Nausea/Vomiting or Sleep)    Multiple myeloma not having achieved remission (H)       metoprolol tartrate 25 MG tablet    LOPRESSOR    180 tablet    TAKE 1 TABLET BY MOUTH TWICE DAILY    Essential hypertension       omeprazole 20 MG CR capsule    priLOSEC    90 capsule    TAKE 1 CAPSULE BY MOUTH EVERY DAY    Congenital hiatus hernia       polyethylene glycol Packet    MIRALAX/GLYCOLAX    7 packet    Take 17 g by mouth 2 times daily as needed (constipation)    Slow transit constipation       prochlorperazine 10 MG tablet    COMPAZINE    30 tablet    Take 1 tablet (10 mg) by mouth every 6 hours as needed (Nausea/Vomiting)    Multiple myeloma not having achieved remission (H)       Selenium 200 MCG Tabs      Take 100 mcg by mouth daily. Pt takes one half tab of the 200 mcg daily        VITAMIN C PO      Take 1,000 mg by mouth daily        VITAMIN D (CHOLECALCIFEROL) PO      Take 1,000 Units by mouth        * Notice:  This list has 9 medication(s) that are the same as other medications prescribed for you. Read the directions carefully, and ask your doctor or other care provider to review them with you.

## 2018-03-22 NOTE — PROGRESS NOTES
Infusion Nursing Note:  Roc Parkinson presents today for drazalex.    Patient seen by provider today: No   present during visit today: Not Applicable.    Note: Pt     Intravenous Access:  Peripheral IV placed.    Treatment Conditions:  Lab Results   Component Value Date    HGB 7.7 03/22/2018     Lab Results   Component Value Date    WBC 3.6 03/22/2018      Lab Results   Component Value Date    ANEU 2.7 03/22/2018     Lab Results   Component Value Date     03/22/2018      Results reviewed, labs MET treatment parameters, ok to proceed with treatment.  Blood transfusion consent signed today 3/22/18.    1127 REACTION TO DARATUMUMAB at 100ml/hr x 10 mins; Stopped infusion  S/S    Cough, Restless legs; flushed face;     Meds given: from 5051-1751   Solumedrol    Albuterol Inhaler   Albuterol Neb   NS 1000mls 200cc/hr  Joey Campuzano and DR. Quinn assessed patient in infusion area. Monitor until patient reaches baseline.  Then Dr. Quinn will reassess.    1154     S/S  Cough increasing HR and B/P elevating; Rigors   Med given: from 7123-8737   Epinephrine IM Left Thigh   Demerol 12.5mg   Dr. Quinn checked in on patient in infusion area.  Continue to monitor.    VS monitored through out see flowsheet.    1240   EKG ordered  HR irreg and elevated  Peripheral IV infiltrated.  O2 Sats dipping into  Low 90's  O2 delivered at 4L/Min  O2 Sats now 95%  Joey Campuzano NP saw patient to explain EKG results: EKG show possible A-Fib incomplete right bundle branch block.   Patient to be taken to ER.  HR up to 150  911 called to transport patient.  Phone call placed to  Bam woods.  L/M no answer.    Post Infusion Assessment:  patient sent to ER via 911.   rxn to daratumumab C1D1  And possible A-Fib    Discharge Plan:   Phone call placed to patient's son; L/M to call infusion area and/or meet father in ER..    Doug Rios RN

## 2018-03-22 NOTE — TELEPHONE ENCOUNTER
Social Work Progress Note      Data/Intervention:  Patient Name:  Roc Parkinson  /Age:  1926 (89 year old)    Reason for Follow-Up:  Bam called this clinician and requested follow-up regarding transportation options available for pt for medical appointments as pt has indicated to family that he feels less confident driving.     Intervention:   Bam reported that pt is interested in learning about transportation options available for medical and non-medical visits. This clinician provided son with information regarding local transportation support available. Made plan to visit pt 3/29/18 to follow-up regarding transportation concern as this clinician is out of office 3/23/18 at pt's next clinic visit.     Resources Provided:  Road To Recovery  [employer]  [employer]  Driving Miss [place]  Senior Linkage Line    Assessment:  Roc is a delightful 89-year-old gentleman with a diagnosis of multiple myeloma. Roc appears to have great support from his three sons who all live at a distance, but have the flexibility to be present for Roc when needed. Roc appears to benefit from [employer] meal delivery, and acknowledges that he might need additional assistance as driving appears to be a greater concern or his. Roc acknowledges that finances continue to be a stressor, but is reluctant to receive financial assistance from family or foundation support. This clinician feels that pt would be good candidate for travel assistance through the [employer], and I will plan to discuss this with pt at next visit.     Plan:  Previously provided patient/family with writer's contact information and availability. This clinician will plan to see pt again 3/29/18 to follow-up regarding transportation concern and need for further psychosocial intervention and support.     Please call or page if needs or concerns arise.     WESLEY Negro, MaineGeneral Medical CenterSW  Direct Phone: [phone]  Pager: [phone]

## 2018-03-22 NOTE — IP AVS SNAPSHOT
"[facility] CARDIAC SPECIALTY CARE: [phone]                                              INTERAGENCY TRANSFER FORM - PHYSICIAN ORDERS   3/22/2018                    Hospital Admission Date: 3/22/2018  JACOB MEDELLIN   : 1926  Sex: Male        Attending Provider: Manjeet Dawson MD     Allergies:  No Known Allergies    Infection:  ESBL   Service:  HOSPITALIST    Ht:  1.778 m (5' 10\")   Wt:  90.3 kg (199 lb)   Admission Wt:  95.3 kg (210 lb)    BMI:  28.55 kg/m 2   BSA:  2.11 m 2            Patient PCP Information     Provider PCP Type    Dickson Reich MD General      ED Clinical Impression     Diagnosis Description Comment Added By Time Added    Atrial fibrillation with rapid ventricular response (H) [I48.91] Atrial fibrillation with rapid ventricular response (H) [I48.91]  Nelli Meza MD 3/22/2018  3:33 PM      Hospital Problems as of 3/27/2018              Priority Class Noted POA    Atrial fibrillation with RVR (H) Medium  3/22/2018 Yes      Non-Hospital Problems as of 3/27/2018              Priority Class Noted    Dysphagia Medium  10/22/2012    Malignant neoplasm of prostate (H) Medium  10/22/2012    Malignant neoplasm of bladder (H) Medium  10/22/2012    Essential hypertension, benign Medium  10/22/2012    Asymptomatic varicose veins Medium  10/22/2012    Congenital hiatus hernia Medium  10/22/2012    Oral lesion Medium  2012    CTS (carpal tunnel syndrome) Medium  2013    Type 2 diabetes mellitus with stage 3 chronic kidney disease, with long-term current use of insulin (H) Medium  2013    Irritable bowel syndrome Medium  10/9/2013    Vitamin D deficiency disease Medium  10/9/2013    Microalbuminuria Medium  2013    Obesity Medium  2014    UTI (urinary tract infection) w hx of recurrence Medium  2014    Iron deficiency anemia Medium  2014    Nonsmoker Medium  2014    A-fib (H) Medium  2014    Health Care Home Medium  " 8/19/2014    Hyperlipidemia Medium  Unknown    CKD (chronic kidney disease) stage 3, GFR 30-59 ml/min Medium  10/14/2015    Hypothyroidism Medium  2/29/2016    Long-term (current) use of anticoagulants [Z79.01] Medium  3/21/2016    Macrocytic anemia Medium  10/1/2016    GIB (gastrointestinal bleeding) Medium  11/13/2016    Multiple myeloma not having achieved remission (H) Medium  11/23/2016    Hypercalcemia Medium  12/15/2016    Hyperglycemia Medium  12/17/2016    Urinary tract infection Medium  2/14/2017    Hyperkalemia Medium  2/22/2017    ACP (advance care planning) Medium  2/28/2017    MDS (myelodysplastic syndrome) (H) Medium  5/17/2017    Chemotherapy-induced neutropenia (H) Medium  6/27/2017    Anemia in neoplastic disease Medium  6/27/2017    UTI due to extended-spectrum beta lactamase (ESBL) producing Escherichia coli Medium  1/29/2018    Essential hypertension Medium  1/29/2018    Physical deconditioning Medium  1/29/2018      Code Status History     Date Active Date Inactive Code Status Order ID Comments User Context    3/27/2018 11:07 AM  DNR/DNI [id number]  Cindy Kitchen MD Outpatient    3/22/2018  5:20 PM 3/27/2018 11:07 AM DNR/DNI [id number]  Manjeet Dawson MD Inpatient    1/20/2018  6:02 AM 1/23/2018  7:30 PM DNR/DNI [id number]  Martha Bobby MD Inpatient    12/18/2016  8:57 AM 1/20/2018  6:02 AM DNI [id number]  Alexandra Gay PA-C Outpatient    12/17/2016  3:47 PM 12/18/2016  8:57 AM DNI [id number]  Alexandra Gay PA-C ED    11/20/2016 10:20 AM 12/17/2016  3:47 PM Full Code [id number]  Mary Peters MD Outpatient    11/13/2016  5:58 PM 11/20/2016 10:20 AM Full Code [id number]  Alexys Fontenot MD Inpatient    8/15/2014 12:03 PM 11/13/2016  5:58 PM Full Code [id number]  Estela Mcelroy MD Outpatient    8/14/2014  1:18 PM 8/15/2014 12:03 PM Full Code [id number]  Estela Mcelroy MD Inpatient    11/6/2012  7:23 AM 11/9/2012  2:00 PM Full Code [id number]  Sergey,  Neeta Lynn RN Inpatient    11/5/2012 11:23 AM 11/6/2012  7:23 AM Full Code [id number]  Alexandrea Jackson RN Inpatient         Medication Review      START taking        Dose / Directions Comments    guaiFENesin-dextromethorphan 100-10 MG/5ML syrup   Commonly known as:  ROBITUSSIN DM   Used for:  Cough        Dose:  5 mL   Take 5 mLs by mouth every 6 hours as needed for cough or congestion   Quantity:  560 mL   Refills:  0        levofloxacin 500 MG tablet   Commonly known as:  LEVAQUIN   Used for:  Pneumonia of right lung due to infectious organism, unspecified part of lung        Dose:  500 mg   Start taking on:  3/28/2018   Take 1 tablet (500 mg) by mouth daily for 7 days   Quantity:  7 tablet   Refills:  0        metoprolol succinate 50 MG 24 hr tablet   Commonly known as:  TOPROL-XL   Used for:  Atrial fibrillation with rapid ventricular response (H)        Dose:  50 mg   Start taking on:  3/28/2018   Take 1 tablet (50 mg) by mouth daily   Quantity:  30 tablet   Refills:  0          CONTINUE these medications which may have CHANGED, or have new prescriptions. If we are uncertain of the size of tablets/capsules you have at home, strength may be listed as something that might have changed.        Dose / Directions Comments    amLODIPine 10 MG tablet   Commonly known as:  NORVASC   This may have changed:  See the new instructions.   Used for:  Essential hypertension        Dose:  10 mg   Start taking on:  3/28/2018   Take 1 tablet (10 mg) by mouth daily   Quantity:  30 tablet   Refills:  0        insulin detemir 100 UNIT/ML injection   Commonly known as:  LEVEMIR FLEXPEN/FLEXTOUCH   This may have changed:  how much to take   Used for:  Type 2 diabetes mellitus with diabetic chronic kidney disease, unspecified CKD stage, unspecified long term insulin use status (H), Uncontrolled type 2 diabetes mellitus with hyperglycemia, unspecified long term insulin use status (H), Type 2 diabetes mellitus with stage 3 chronic  kidney disease, with long-term current use of insulin (H)        Dose:  10 Units   Inject 10 Units Subcutaneous every morning (before breakfast)   Quantity:  15 mL   Refills:  1          CONTINUE these medications which have NOT CHANGED        Dose / Directions Comments    acyclovir 400 MG tablet   Commonly known as:  ZOVIRAX   Used for:  Multiple myeloma not having achieved remission (H)        Dose:  400 mg   Take 1 tablet (400 mg) by mouth 2 times daily Start 1 week prior to daratumumab and continue for 3 months after treatment is complete.   Quantity:  60 tablet   Refills:  11        * B-D U/F 31G X 8 MM   Generic drug:  insulin pen needle        Refills:  0        * B-D U/F 31G X 8 MM   Used for:  Type 2 diabetes mellitus with stage 3 chronic kidney disease, with long-term current use of insulin (H)   Generic drug:  insulin pen needle        USE 5 PEN NEEDLES PER DAY OR AS DIRECTED   Quantity:  500 each   Refills:  1        Blood Glucose Monitoring Suppl Misc        3 times daily (before meals)   Refills:  0        dexamethasone 4 MG tablet   Commonly known as:  DECADRON        Dose:  20 mg   Take 20 mg by mouth See Admin Instructions Take 20mg on day 1,2,8,9,15,16,22 & 23 before Carfilzomib dose.   Refills:  0        ferrous sulfate 325 (65 FE) MG tablet   Commonly known as:  IRON        Dose:  325 mg   Take 325 mg by mouth daily (with breakfast)   Refills:  0        * FREESTYLE LITE test strip   Used for:  Uncontrolled type 1 diabetes mellitus with stage 3 chronic kidney disease (H)   Generic drug:  blood glucose monitoring        USE 1 STRIP TO TEST TWICE DAILY.   Quantity:  200 strip   Refills:  1        * FREESTYLE LITE test strip   Used for:  Uncontrolled type 1 diabetes mellitus with stage 3 chronic kidney disease (H)   Generic drug:  blood glucose monitoring        USE TO TEST FOUR TIMES DAILY   Quantity:  400 strip   Refills:  0        furosemide 20 MG tablet   Commonly known as:  LASIX   Used for:   Multiple myeloma not having achieved remission (H)        Dose:  20 mg   Take 1 tablet (20 mg) by mouth daily   Quantity:  90 tablet   Refills:  1        gemfibrozil 600 MG tablet   Commonly known as:  LOPID   Used for:  Hyperlipidemia        TAKE 1 TABLET BY MOUTH TWICE DAILY   Quantity:  180 tablet   Refills:  1        * INSULIN ASPART SC        Dose:  13 Units   Inject 13 Units Subcutaneous every morning   Refills:  0        * INSULIN ASPART SC        Dose:  11 Units   Inject 11 Units Subcutaneous daily (before lunch)   Refills:  0        * INSULIN ASPART SC        Inject Subcutaneous At Bedtime Inject 4 unit subcutaneously at bedtime for Diabetes II Ok to use Humalog insulin with same order details   Refills:  0        * INSULIN ASPART SC        Dose:  9 Units   Inject 9 Units Subcutaneous daily (with dinner)   Refills:  0        * INSULIN ASPART SC        Inject Subcutaneous 4 times daily Sliding scale: 140-175 1 unit 176-210 2 units 211-245 3 units 246-280 4 units 281-315 5 units 316-350 6 units 351-385 7 units 386-420 8 units >420 9 units   Refills:  0        levothyroxine 25 MCG tablet   Commonly known as:  SYNTHROID/LEVOTHROID   Used for:  Acquired hypothyroidism        TAKE 1 TABLET BY MOUTH EVERY DAY   Quantity:  90 tablet   Refills:  0        LISINOPRIL PO        Dose:  15 mg   Take 15 mg by mouth daily   Refills:  0        LORazepam 0.5 MG tablet   Commonly known as:  ATIVAN   Used for:  Multiple myeloma not having achieved remission (H)        Dose:  0.5 mg   Take 1 tablet (0.5 mg) by mouth every 4 hours as needed (Anxiety, Nausea/Vomiting or Sleep)   Quantity:  4 tablet   Refills:  5        METHOCARBAMOL PO        Dose:  750 mg   Take 750 mg by mouth 3 times daily as needed for muscle spasms   Refills:  0        omeprazole 20 MG CR capsule   Commonly known as:  priLOSEC   Used for:  Congenital hiatus hernia        TAKE 1 CAPSULE BY MOUTH EVERY DAY   Quantity:  90 capsule   Refills:  3         polyethylene glycol Packet   Commonly known as:  MIRALAX/GLYCOLAX   Used for:  Slow transit constipation        Dose:  17 g   Take 17 g by mouth 2 times daily as needed (constipation)   Quantity:  7 packet   Refills:  0        prochlorperazine 10 MG tablet   Commonly known as:  COMPAZINE   Used for:  Multiple myeloma not having achieved remission (H)        Dose:  10 mg   Take 1 tablet (10 mg) by mouth every 6 hours as needed (Nausea/Vomiting)   Quantity:  30 tablet   Refills:  5        Selenium 200 MCG Tabs        Dose:  100 mcg   Take 100 mcg by mouth daily. Pt takes one half tab of the 200 mcg daily   Refills:  0        VITAMIN C PO        Dose:  500 mg   Take 500 mg by mouth daily   Refills:  0        VITAMIN D (CHOLECALCIFEROL) PO        Dose:  1000 Units   Take 1,000 Units by mouth   Refills:  0        * Notice:  This list has 9 medication(s) that are the same as other medications prescribed for you. Read the directions carefully, and ask your doctor or other care provider to review them with you.      STOP taking     GLIPIZIDE PO           metoprolol tartrate 25 MG tablet   Commonly known as:  LOPRESSOR                   After Care     Activity - Up with assistive device           Activity - Up with nursing assistance           Advance Diet as Tolerated       Follow this diet upon discharge: Orders Placed This Encounter      Dysphagia Diet Level 3 Advanced Thin Liquids (water, ice chips, juice, milk gelatin, ice cream, etc)       Fall precautions           General info for SNF       Length of Stay Estimate: Short Term Care: Estimated # of Days <30  Condition at Discharge: Improving  Level of care:skilled   Rehabilitation Potential: Fair  Admission H&P remains valid and up-to-date: Yes  Recent Chemotherapy: N/A  Use Nursing Home Standing Orders: Yes       Glucose monitor nursing POCT       Before meals and at bedtime       Intake and output       Every shift       Mantoux instructions       Give two-step  Jagdish (PPD) Per Facility Policy Yes             Referrals     Occupational Therapy Adult Consult       Evaluate and treat as clinically indicated.    Reason:  deconditioning       Physical Therapy Adult Consult       Evaluate and treat as clinically indicated.    Reason:  deconditioning             Your next 10 appointments already scheduled     Mar 29, 2018  8:00 AM CDT   Level 6 with SH INFUSION CHAIR 5   [facility] ([facility])    [facility] [place] [address]  [place] [address]   [phone]            Mar 29, 2018 10:00 AM CDT   Return Visit with ALEXANDER Salazar CNP   [facility])    [facility] [place] [id number]  [place] [address]   [phone]            Apr 05, 2018  7:30 AM CDT   Level 6 with SH INFUSION CHAIR 1   [facility] ([facility])    [facility] [place] [id number]  [place] [address]   [phone]            Apr 11, 2018  8:00 AM CDT   Level 6 with SH INFUSION CHAIR 6   [facility])    [facility] [place] [id number]  [place] [address]   [phone]            Apr 18, 2018  8:00 AM CDT   Level 6 with SH INFUSION CHAIR 4   [facility])    [facility] [place] [id number]  [place] [address]   [phone]            Apr 25, 2018  8:00 AM CDT   Level 6 with SH INFUSION CHAIR 6   [facility])    [facility]  [address]  [place] [address]   [phone]            Apr 25, 2018  8:45 AM CDT   Return Visit with Gretel Quinn MD   [facility])    [facility]  [facility]  [address]  [place] [address]   [phone]            May 09, 2018  8:00 AM CDT   Level 6 with SH INFUSION CHAIR 5   [facility] ([facility])    [facility] [place] [place]  [address]  [place] [address]   [phone]            May 16, 2018  8:00 AM CDT   Level 6 with SH INFUSION CHAIR 6   [facility] ([facility])    [facility] [place]  [address]  [place] [address]   [phone]            Jun 06, 2018  1:00 PM CDT   Level 1 with SH INFUSION CHAIR 13   [facility] ([facility])    [facility] [place]  [address]  [place] [address]   [phone]              Follow-Up Appointment Instructions     Future Labs/Procedures    Follow Up and recommended labs and tests     Comments:    Follow up with Nursing home physician.  No follow up labs or test are needed.  Recommend follow up Chest X-ray in 6 weeks.      Follow-Up Appointment Instructions     Follow Up and recommended labs and tests       Follow up with Nursing home physician.  No follow up labs or test are needed.  Recommend follow up Chest X-ray in 6 weeks.             Statement of Approval     Ordered          03/27/18 1125  I have reviewed and agree with all the recommendations and orders detailed in this document.  EFFECTIVE NOW     Approved and electronically signed by:  Cindy Kitchen MD

## 2018-03-22 NOTE — ED PROVIDER NOTES
"  History     Chief Complaint:  Allergic reaction  Allergic Reaction (@ clinic receiving new chemo med today, reports after 1.5 hours into infusion patient reported gen. weakness and fast HR ( afib - has hx) given allergic reaction meds, patient felt better but still in a fib with RVR)      HPI   Roc Parkinson is a 89 year old male with a history of atrial fibrillation who presents with an allergic reaction.     Allergies:  No Known Drug Allergies      Medications:    Ativan  Compazine  Omeprazole  Levothyroxine  Insulin  Metoprolol  Lisinopril  Amlodipine  Lasix  Selenium     Past Medical History:    Arthritis  Blood transfusion  Diabetes mellitus  Heart disease  Hyperlipidemia  Hypertension  Hypothyroidism  Malignant neoplasm or prostate and bladder  UTI  Hernia  Irritable bowel syndrome  GI bleed  CKD  Carpal tunnel syndrome    Past Surgical History:    Arthroplasty knee  Biopsy  Colonoscopy x 2  TURP x 2  Left knee orthopedic surgery  Phacoemulsification clear cornea with standard intraocular lens implant x 2  Soft tissue surgery    Family History:    Heart arrhythmia  Paget's disease    Social History:  The patient was accompanied to the ED by ***.  Smoking Status: No  Smokeless Tobacco: No  Alcohol Use: No   Marital Status:   [5]    Review of Systems  ***    Physical Exam   Vitals:Patient Vitals for the past 24 hrs:   BP Temp Temp src Heart Rate Resp SpO2 Height Weight   03/22/18 1328 - 100  F (37.8  C) Oral - - - - -   03/22/18 1325 108/84 - - 141 18 91 % 1.778 m (5' 10\") 95.3 kg (210 lb)        Physical Exam  ***    Emergency Department Course     ECG:  ***    Imaging:  Radiology findings were communicated with the {Patient/MD:[id number]} who voiced understanding of the findings.  ***    Laboratory:  Laboratory findings were communicated with the {Patient/MD:[id number]} who voiced understanding of the findings.  ***    Procedures:  ***    Interventions:  ***  Medications - No data to display " "    Emergency Department Course:  Nursing notes and vitals reviewed.  I performed an exam of the patient as documented above.     I personally reviewed the laboratory results with the {PATIENT, FAMILY MEMBER, CAREGIVER:[id number]} and answered all related questions prior to discharge.    Impression & Plan      Medical Decision Making:  ***    Critical Care time was *** minutes for this patient excluding procedures.     Diagnosis:  ***    Disposition:   ***    CMS Diagnoses: {Pneumonia/stemi stroke in navigator will autofill in note:870567::\"None\"}     {trauma activation?:[id number]::\" \"}    Discharge Medications:  ***  New Prescriptions    No medications on file       Scribe Disclosure:  I, Jorge Bethea, am serving as a scribe at 1:50 PM on 3/22/2018 to document services personally performed by Piper Flores MD, based on my observations and the provider's statements to me.   [facility] EM[facility]GENCY [facility]    "

## 2018-03-22 NOTE — PROGRESS NOTES
Oncology/Hematology Visit Note  Mar 22, 2018    Reason for Visit: follow up of multiple myeloma     History of Present Illness: Roc Parkinson is a 89 year old male with multiple myeloma. recently he was  Treated  with Carfilzomib and dexamethasone started on 01/02/2018  And Zometa. Unfortunately, March 7 myeloma labs show progression of disease. HE saw Dr Quinn and the teratement was changed to Dartumumab/dexamethasone  -Today she comes to the clinic for cycle 1 day 1 of Dartumumab/dexamethasone    - he has MDS - he is taking Aranesp 300 mcg SQ every three weeks  -hx of prostate cancer no recurrences since 1986     He comes in today for routine follow up prior to cycle     Interval History:    Prior infusion to  Dartumumab -patient was feeling okay denied cough shortness of breath chest pain heart palpitations.  Denies fever chills or sweats he denied nausea vomiting diarrhea or abdominal pain.  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    During the infusion of the Dartumumab  Cycle 1 day 1 patient developed cough shortness of breath, heart palpitations.  He was premedicated before the chemo with Tylenol, Benadryl and dexamethasone.  He was given PRN  medications including Solu-Medrol 125, epi and Demerol 12.5 mg.  EKG showed A. Fib.  Heart rate 140-150, Sats 98-97% on 2 L,    -911 called and patient was transported to the emergency room       Review of Systems:  14 point ROS of systems including Constitutional, Eyes, Respiratory, Cardiovascular, Gastroenterology, Genitourinary, Integumentary, Muscularskeletal, Psychiatric were all negative except for pertinent positives noted in my HPI.      Current Outpatient Prescriptions   Medication Sig Dispense Refill     LORazepam (ATIVAN) 0.5 MG tablet Take 1 tablet (0.5 mg) by mouth every 4 hours as needed (Anxiety, Nausea/Vomiting or Sleep) 30 tablet 5     prochlorperazine (COMPAZINE) 10 MG tablet Take 1 tablet (10 mg) by mouth every 6 hours as needed  (Nausea/Vomiting) 30 tablet 5     dexamethasone (DECADRON) 4 MG tablet Take 1 tablet (4 mg) by mouth daily (with breakfast) for 2 days following each daratumumab infusion 8 tablet 4     acyclovir (ZOVIRAX) 400 MG tablet Take 1 tablet (400 mg) by mouth 2 times daily Start 1 week prior to daratumumab and continue for 3 months after treatment is complete. 60 tablet 11     omeprazole (PRILOSEC) 20 MG CR capsule TAKE 1 CAPSULE BY MOUTH EVERY DAY 90 capsule 3     levothyroxine (SYNTHROID/LEVOTHROID) 25 MCG tablet TAKE 1 TABLET BY MOUTH EVERY DAY 90 tablet 0     NOVOLOG FLEXPEN 100 UNIT/ML soln INJECT 1 TO 5 UNITS UNDER THE SKIN FOUR TIMES DAILY WITH MEALS AND NIGHTLY 15 mL 1     FREESTYLE LITE test strip USE TO TEST FOUR TIMES DAILY 400 strip 0     insulin aspart (NOVOLOG FLEXPEN) 100 UNIT/ML injection Inject 4-17 units under the skin four times daily (with meals and nightly) 60 mL 1     INSULIN ASPART SC Inject Subcutaneous every morning Inject 6 unit subcutaneously one time a day for Diabetes II 100unit/mL before breakfast       INSULIN ASPART SC Inject Subcutaneous daily (before lunch) Inject 5 unit subcutaneously one time a day for Diabetes II before lunch       INSULIN ASPART SC Inject Subcutaneous At Bedtime Inject 4 unit subcutaneously at bedtime for Diabetes II Ok to use Humalog insulin with same order details       VITAMIN D, CHOLECALCIFEROL, PO Take 1,000 Units by mouth       metoprolol tartrate (LOPRESSOR) 25 MG tablet TAKE 1 TABLET BY MOUTH TWICE DAILY 180 tablet 0     tamsulosin (FLOMAX) 0.4 MG capsule Take 0.4 mg by mouth At Bedtime       Blood Glucose Monitoring Suppl MISC 3 times daily (before meals)        polyethylene glycol (MIRALAX/GLYCOLAX) Packet Take 17 g by mouth 2 times daily as needed (constipation) 7 packet      insulin detemir (LEVEMIR FLEXPEN/FLEXTOUCH) 100 UNIT/ML injection Inject 16 Units Subcutaneous every morning (before breakfast) 15 mL 1     LISINOPRIL PO Take 15 mg by mouth daily        amLODIPine (NORVASC) 5 MG tablet TAKE 1 TABLET BY MOUTH DAILY 90 tablet 3     furosemide (LASIX) 20 MG tablet Take 1 tablet (20 mg) by mouth daily 90 tablet 1     gemfibrozil (LOPID) 600 MG tablet TAKE 1 TABLET BY MOUTH TWICE DAILY 180 tablet 1     [DISCONTINUED] cyclophosphamide (CYTOXAN) 50 MG capsule CHEMO Take 6 capsules (300 mg) by mouth once a week 24 capsule 0     B-D U/F 31G X 8 MM insulin pen needle USE 5 PEN NEEDLES PER DAY OR AS DIRECTED 500 each 1     FREESTYLE LITE test strip USE 1 STRIP TO TEST TWICE DAILY. 200 strip 1     B-D U/F 31G X 8 MM insulin pen needle        Ascorbic Acid (VITAMIN C PO) Take 1,000 mg by mouth daily       ferrous sulfate (IRON) 325 (65 FE) MG tablet Take 325 mg by mouth daily (with breakfast)       Selenium 200 MCG TABS Take 100 mcg by mouth daily. Pt takes one half tab of the 200 mcg daily          Physical Examination:  General: The patient is a pleasant male in no acute distress.  There were no vitals taken for this visit.     BP recheck 155/60    Wt Readings from Last 10 Encounters:   03/22/18 95.3 kg (210 lb)   03/22/18 95.3 kg (210 lb)   03/14/18 93.2 kg (205 lb 6.4 oz)   03/14/18 93.2 kg (205 lb 6.4 oz)   03/08/18 93.3 kg (205 lb 12.8 oz)   03/08/18 93.4 kg (205 lb 12.8 oz)   02/22/18 93.9 kg (206 lb 15.7 oz)   02/22/18 94 kg (207 lb 3.2 oz)   02/21/18 93 kg (205 lb)   02/20/18 93 kg (205 lb)     HEENT: EOMI, PERRL. Sclerae are anicteric. Oral mucosa is pink and moist with no lesions or thrush.   Lymph: Neck is supple with no lymphadenopathy in the cervical or supraclavicular areas.   Heart: Regular rate and rhythm.   Lungs: Clear to auscultation bilaterally.   GI: Bowel sounds present, soft, nontender with no palpable hepatosplenomegaly or masses.   Extremities: No lower extremity edema noted bilaterally.   Skin: No rashes, petechiae, or bruising noted on exposed skin.    Laboratory Data:    Results for JACOB MEDELLIN (MRN [id number]) as of 3/22/2018 13:49   Ref.  Range 3/22/2018 08:40   WBC Latest Ref Range: 4.0 - 11.0 10e9/L 3.6 (L)   Hemoglobin Latest Ref Range: 13.3 - 17.7 g/dL 7.7 (L)   Hematocrit Latest Ref Range: 40.0 - 53.0 % 24.6 (L)   Platelet Count Latest Ref Range: 150 - 450 10e9/L 178   RBC Count Latest Ref Range: 4.4 - 5.9 10e12/L 2.16 (L)   MCV Latest Ref Range: 78 - 100 fl 114 (H)   MCH Latest Ref Range: 26.5 - 33.0 pg 35.6 (H)   MCHC Latest Ref Range: 31.5 - 36.5 g/dL 31.3 (L)   RDW Latest Ref Range: 10.0 - 15.0 % 19.6 (H)   Diff Method Unknown Automated Method   % Neutrophils Latest Units: % 75.3   % Lymphocytes Latest Units: % 15.2   % Monocytes Latest Units: % 7.3   % Eosinophils Latest Units: % 0.8   % Basophils Latest Units: % 0.3   % Immature Granulocytes Latest Units: % 1.1   Nucleated RBCs Latest Ref Range: 0 /100 0   Absolute Neutrophil Latest Ref Range: 1.6 - 8.3 10e9/L 2.7   Absolute Lymphocytes Latest Ref Range: 0.8 - 5.3 10e9/L 0.5 (L)   Absolute Monocytes Latest Ref Range: 0.0 - 1.3 10e9/L 0.3   Absolute Eosinophils Latest Ref Range: 0.0 - 0.7 10e9/L 0.0   Absolute Basophils Latest Ref Range: 0.0 - 0.2 10e9/L 0.0   Abs Immature Granulocytes Latest Ref Range: 0 - 0.4 10e9/L 0.0   Absolute Nucleated RBC Unknown 0.0   ABO Unknown O   RH(D) Unknown Pos   Antibody Screen Unknown Neg   Test Valid Only At Latest Units:     Corky Cabral...   Specimen Expires Unknown 03/25/2018   Blood Component Type Unknown Red Blood Cells L...   Unit Number Unknown [id number]   Division Number Unknown [id number]   Status of Unit Unknown Ready for patient...   Crossmatch Unknown Red Blood Cells   Unit Status Unknown DANIELLE             Assessment and Plan:    This is a 89 y/o male with     Multiple myeloma   - kappa light chain   S/p   Velcade/dex and later  cyclophosphamide added to treatment  Developed neuropathy therefore Velcade  Discontinued. He  continued with weekly cyclophosphamide and dexamethasone. Disease progressed and he was  swished on Carfilzomib and  dexamethasone on 01/02/2018   Unfortunately,  he progressed through the treatment.  Therefore, his treatment was changed to Dartumumab/dexamethasone.   He came to the clinic today for cycle 1 day 1 of the chemo.  Patient is also on Zometa every 12 weeks    During the infusion of the Dartumumab today cycle 1 day 1 , patient developed cough shortness of breath, cough heart palpitations.  He was premedicated before the chemo with Tylenol, Benadryl and dexamethasone.  He was given PRN  medications including Solu-Medrol 125, epi and Demerol 12.5 mg.  EKG showed A. Fib.  Heart rate 140-150, Sats 98-97% on 2 L, systolic blood pressure in the 130s-140 range, respirations 20.  Patient is taking metoprolol lisinopril and amlodipine.  -911 called and patient was transported to the emergency room         MDS   anemia stable   No need for transfusions  He will continue with Aranesp 300 mcg SQ every three weeks      Anemia -multifactorial- progression of multiple myeloma, MDS, chemotherapy.  His hemoglobin has been stable lately.  He has been getting Aranesp 300 MCG subcutaneously every 3 weeks        hx of prostate cancer no recurrences since 1986      Hypertension - stable for him   He is on amlodipine, lisinopril and metoprolol     -Patient was sent to the emergency room today where A. fib and shortness of breath.    ALEXANDER Salazar CNP  Hem/Onc   [facility]

## 2018-03-22 NOTE — IP AVS SNAPSHOT
"          [facility]: [phone]                                              INTERAGENCY TRANSFER FORM - LAB / IMAGING / EKG / EMG RESULTS   3/22/2018                    Hospital Admission Date: 3/22/2018  JACOB MEDELLIN   : 1926  Sex: Male        Attending Provider: Manjeet Dawson MD     Allergies:  No Known Allergies    Infection:  ESBL   Service:  HOSPITALIST    Ht:  1.778 m (5' 10\")   Wt:  90.3 kg (199 lb)   Admission Wt:  95.3 kg (210 lb)    BMI:  28.55 kg/m 2   BSA:  2.11 m 2            Patient PCP Information     Provider PCP Type    Dickson Reich MD General         Lab Results - 3 Days      Glucose by meter [[id number]]  Resulted: 18 0807, Result status: Final result    Ordering provider: Manjeet Dawson MD  18 0752 Resulting lab: POINT OF CARE TEST, GLUCOSE    Specimen Information    Type Source Collected On     18 0752          Components       Value Reference Range Flag Lab   Glucose 77 70 - 99 mg/dL  170            Basic metabolic panel [[id number]]  Resulted: 18 0625, Result status: Final result    Ordering provider: Micky Moya MD  18 0000 Resulting lab: [facility]    Specimen Information    Type Source Collected On   Blood  18 0545          Components       Value Reference Range Flag Lab   Sodium 139 133 - 144 mmol/L  FrStHsLb   Potassium 3.5 3.4 - 5.3 mmol/L  FrStHsLb   Chloride 106 94 - 109 mmol/L  FrStHsLb   Carbon Dioxide 24 20 - 32 mmol/L  FrStHsLb   Anion Gap 9 3 - 14 mmol/L  FrStHsLb   Glucose 83 70 - 99 mg/dL  FrStHsLb   Urea Nitrogen 18 7 - 30 mg/dL  FrStHsLb   Creatinine 0.90 0.66 - 1.25 mg/dL  FrStHsLb   GFR Estimate 79 >60 mL/min/1.7m2  FrStHsLb   Comment:  Non  GFR Calc   GFR Estimate If Black >90 >60 mL/min/1.7m2  FrStHsLb   Comment:  African American GFR Calc   Calcium 8.5 8.5 - 10.1 mg/dL  FrStHsLb            CBC with platelets differential [[id number]] " (Abnormal)  Resulted: 03/27/18 0602, Result status: Final result    Ordering provider: Gretel Quinn MD  03/27/18 0000 Resulting lab: [facility]    Specimen Information    Type Source Collected On   Blood  03/27/18 0545          Components       Value Reference Range Flag Lab   WBC 3.5 4.0 - 11.0 10e9/L L FrStHsLb   RBC Count 2.88 4.4 - 5.9 10e12/L L FrStHsLb   Hemoglobin 10.0 13.3 - 17.7 g/dL L FrStHsLb   Hematocrit 30.8 40.0 - 53.0 % L FrStHsLb    78 - 100 fl H FrStHsLb   MCH 34.7 26.5 - 33.0 pg H FrStHsLb   MCHC 32.5 31.5 - 36.5 g/dL  FrStHsLb   RDW 19.5 10.0 - 15.0 % H FrStHsLb   Platelet Count 96 150 - 450 10e9/L L FrStHsLb   Diff Method Automated Method   FrStHsLb   % Neutrophils 72.9 %  FrStHsLb   % Lymphocytes 15.1 %  FrStHsLb   % Monocytes 8.0 %  FrStHsLb   % Eosinophils 0.0 %  FrStHsLb   % Basophils 0.3 %  FrStHsLb   % Immature Granulocytes 3.7 %  FrStHsLb   Nucleated RBCs 2 0 /100 H FrStHsLb   Absolute Neutrophil 2.6 1.6 - 8.3 10e9/L  FrStHsLb   Absolute Lymphocytes 0.5 0.8 - 5.3 10e9/L L FrStHsLb   Absolute Monocytes 0.3 0.0 - 1.3 10e9/L  FrStHsLb   Absolute Eosinophils 0.0 0.0 - 0.7 10e9/L  FrStHsLb   Absolute Basophils 0.0 0.0 - 0.2 10e9/L  FrStHsLb   Abs Immature Granulocytes 0.1 0 - 0.4 10e9/L  FrStHsLb   Absolute Nucleated RBC 0.1   FrStHsLb            Glucose by meter [[id number]] (Abnormal)  Resulted: 03/27/18 0318, Result status: Final result    Ordering provider: Manjeet Dawson MD  03/27/18 0306 Resulting lab: POINT OF CARE TEST, GLUCOSE    Specimen Information    Type Source Collected On     03/27/18 0306          Components       Value Reference Range Flag Lab   Glucose 130 70 - 99 mg/dL H 170            Glucose by meter [[id number]]  Resulted: 03/27/18 0227, Result status: Final result    Ordering provider: Manjeet Dawson MD  03/27/18 0213 Resulting lab: POINT OF CARE TEST, GLUCOSE    Specimen Information    Type Source Collected On     03/27/18 0213          Components        Value Reference Range Flag Lab   Glucose 71 70 - 99 mg/dL  170            Glucose by meter [[id number]] (Abnormal)  Resulted: 03/26/18 2145, Result status: Final result    Ordering provider: Manjeet Dawson MD  03/26/18 2130 Resulting lab: POINT OF CARE TEST, GLUCOSE    Specimen Information    Type Source Collected On     03/26/18 2130          Components       Value Reference Range Flag Lab   Glucose 153 70 - 99 mg/dL H 170            Glucose by meter [[id number]] (Abnormal)  Resulted: 03/26/18 1718, Result status: Final result    Ordering provider: Manjeet Dawson MD  03/26/18 1706 Resulting lab: POINT OF CARE TEST, GLUCOSE    Specimen Information    Type Source Collected On     03/26/18 1706          Components       Value Reference Range Flag Lab   Glucose 281 70 - 99 mg/dL H 170            Glucose by meter [[id number]] (Abnormal)  Resulted: 03/26/18 1230, Result status: Final result    Ordering provider: Manjeet Dawson MD  03/26/18 1210 Resulting lab: POINT OF CARE TEST, GLUCOSE    Specimen Information    Type Source Collected On     03/26/18 1210          Components       Value Reference Range Flag Lab   Glucose 286 70 - 99 mg/dL H 170            Glucose by meter [[id number]] (Abnormal)  Resulted: 03/26/18 0813, Result status: Final result    Ordering provider: Manjeet Dawson MD  03/26/18 0802 Resulting lab: POINT OF CARE TEST, GLUCOSE    Specimen Information    Type Source Collected On     03/26/18 0802          Components       Value Reference Range Flag Lab   Glucose 116 70 - 99 mg/dL H 170            Magnesium [[id number]]  Resulted: 03/26/18 0747, Result status: Final result    Ordering provider: Gretel Quinn MD  03/26/18 0607 Resulting lab: [facility]    Specimen Information    Type Source Collected On     03/26/18 0607          Components       Value Reference Range Flag Lab   Magnesium 2.0 1.6 - 2.3 mg/dL  [facility]            Basic metabolic panel [[id number]] (Abnormal)  Resulted:  03/26/18 0641, Result status: Final result    Ordering provider: Micky Moya MD  03/26/18 0000 Resulting lab: [facility]    Specimen Information    Type Source Collected On   Blood  03/26/18 0607          Components       Value Reference Range Flag Lab   Sodium 141 133 - 144 mmol/L  FrStHsLb   Potassium 3.6 3.4 - 5.3 mmol/L  FrStHsLb   Chloride 108 94 - 109 mmol/L  FrStHsLb   Carbon Dioxide 25 20 - 32 mmol/L  FrStHsLb   Anion Gap 8 3 - 14 mmol/L  FrStHsLb   Glucose 134 70 - 99 mg/dL H FrStHsLb   Urea Nitrogen 22 7 - 30 mg/dL  FrStHsLb   Creatinine 0.91 0.66 - 1.25 mg/dL  FrStHsLb   GFR Estimate 78 >60 mL/min/1.7m2  FrStHsLb   Comment:  Non  GFR Calc   GFR Estimate If Black >90 >60 mL/min/1.7m2  FrStHsLb   Comment:  African American GFR Calc   Calcium 8.7 8.5 - 10.1 mg/dL  FrStHsLb            CBC with platelets differential [[id number]] (Abnormal)  Resulted: 03/26/18 0622, Result status: Final result    Ordering provider: Greetl Quinn MD  03/26/18 0000 Resulting lab: [facility]    Specimen Information    Type Source Collected On   Blood  03/26/18 0607          Components       Value Reference Range Flag Lab   WBC 3.7 4.0 - 11.0 10e9/L L FrStHsLb   RBC Count 2.77 4.4 - 5.9 10e12/L L FrStHsLb   Hemoglobin 9.6 13.3 - 17.7 g/dL L FrStHsLb   Hematocrit 29.7 40.0 - 53.0 % L FrStHsLb    78 - 100 fl H FrStHsLb   MCH 34.7 26.5 - 33.0 pg H FrStHsLb   MCHC 32.3 31.5 - 36.5 g/dL  FrStHsLb   RDW 19.8 10.0 - 15.0 % H FrStHsLb   Platelet Count 94 150 - 450 10e9/L L FrStHsLb   Diff Method Automated Method   FrStHsLb   % Neutrophils 77.4 %  FrStHsLb   % Lymphocytes 12.4 %  FrStHsLb   % Monocytes 5.9 %  FrStHsLb   % Eosinophils 0.0 %  FrStHsLb   % Basophils 0.3 %  FrStHsLb   % Immature Granulocytes 4.0 %  FrStHsLb   Nucleated RBCs 2 0 /100 H FrStHsLb   Absolute Neutrophil 2.9 1.6 - 8.3 10e9/L  FrStHsLb   Absolute Lymphocytes 0.5 0.8 - 5.3 10e9/L L FrStHsLb    Absolute Monocytes 0.2 0.0 - 1.3 10e9/L  FrStHsLb   Absolute Eosinophils 0.0 0.0 - 0.7 10e9/L  FrStHsLb   Absolute Basophils 0.0 0.0 - 0.2 10e9/L  FrStHsLb   Abs Immature Granulocytes 0.2 0 - 0.4 10e9/L  FrStHsLb   Absolute Nucleated RBC 0.1   FrStHsLb            Glucose by meter [[id number]] (Abnormal)  Resulted: 03/25/18 2157, Result status: Final result    Ordering provider: Manjeet Dawson MD  03/25/18 2140 Resulting lab: POINT OF CARE TEST, GLUCOSE    Specimen Information    Type Source Collected On     03/25/18 2140          Components       Value Reference Range Flag Lab   Glucose 275 70 - 99 mg/dL H 170            Platelet count [[id number]] (Abnormal)  Resulted: 03/25/18 1505, Result status: Final result    Ordering provider: Micky Moya MD  03/25/18 0545 Resulting lab: [facility]    Specimen Information    Type Source Collected On     03/25/18 0545          Components       Value Reference Range Flag Lab   Platelet Count 82 150 - 450 10e9/L L FrStHsLb            Glucose by meter [[id number]] (Abnormal)  Resulted: 03/25/18 1447, Result status: Final result    Ordering provider: Manjeet Dawson MD  03/25/18 1435 Resulting lab: POINT OF CARE TEST, GLUCOSE    Specimen Information    Type Source Collected On     03/25/18 1435          Components       Value Reference Range Flag Lab   Glucose 192 70 - 99 mg/dL H 170            NT proBNP inpatient and ED [[id number]] (Abnormal)  Resulted: 03/25/18 1428, Result status: Final result    Ordering provider: Micky Moya MD  03/25/18 1243 Resulting lab: [facility]    Specimen Information    Type Source Collected On   Blood  03/25/18 1313          Components       Value Reference Range Flag Lab   N-Terminal Pro BNP Inpatient 7826 0 - 1800 pg/mL H FrStHsLb   Comment:            Reference range shown and results flagged as abnormal are suggested inpatient   cut points for confirming diagnosis if CHF in an acute  setting. Establishing a   baseline value for each individual patient is useful for follow-up. An   inpatient or emergency department NT-proPBNP <300 pg/mL effectively rules out   acute CHF, with 99% negative predictive value.  The outpatient non-acute reference range for ruling out CHF is:   0-125 pg/mL (age 18 to less than 75)   0-450 pg/mL (age 75 yrs and older)              D dimer quantitative [[id number]]  Resulted: 03/25/18 1358, Result status: Final result    Ordering provider: Micky Moya MD  03/25/18 1243 Resulting lab: [facility]    Specimen Information    Type Source Collected On   Blood  03/25/18 1313          Components       Value Reference Range Flag Lab   D Dimer 0.5 0.0 - 0.50 ug/ml FEU  FrStHsLb   Comment:         This D-dimer assay is intended for use in conjunction with a clinical pretest   probability assessment model to exclude pulmonary embolism (PE) and deep   venous thrombosis (DVT) in outpatients suspected of PE or DVT. The cut-off   value is 0.5 ug/mL FEU.              Glucose by meter [[id number]] (Abnormal)  Resulted: 03/25/18 1309, Result status: Final result    Ordering provider: Manjeet Dawson MD  03/25/18 1257 Resulting lab: POINT OF CARE TEST, GLUCOSE    Specimen Information    Type Source Collected On     03/25/18 1257          Components       Value Reference Range Flag Lab   Glucose 211 70 - 99 mg/dL H 170            WBC count [[id number]]  Resulted: 03/25/18 1301, Result status: Final result    Ordering provider: Micky Moya MD  03/25/18 0545 Resulting lab: [facility]    Specimen Information    Type Source Collected On     03/25/18 0545          Components       Value Reference Range Flag Lab   WBC 4.7 4.0 - 11.0 10e9/L  FrStHsLb            Glucose by meter [[id number]] (Abnormal)  Resulted: 03/25/18 0852, Result status: Final result    Ordering provider: Manjeet Dawson MD  03/25/18 0839 Resulting lab: POINT OF CARE  TEST, GLUCOSE    Specimen Information    Type Source Collected On     03/25/18 0839          Components       Value Reference Range Flag Lab   Glucose 105 70 - 99 mg/dL H 170            Hemoglobin [[id number]] (Abnormal)  Resulted: 03/25/18 0626, Result status: Final result    Ordering provider: Micky Moya MD  03/25/18 0000 Resulting lab: [facility]    Specimen Information    Type Source Collected On   Blood  03/25/18 0545          Components       Value Reference Range Flag Lab   Hemoglobin 9.8 13.3 - 17.7 g/dL L FrStHsLb            Glucose by meter [[id number]] (Abnormal)  Resulted: 03/25/18 0243, Result status: Final result    Ordering provider: Manjeet Dawson MD  03/25/18 0227 Resulting lab: POINT OF CARE TEST, GLUCOSE    Specimen Information    Type Source Collected On     03/25/18 0227          Components       Value Reference Range Flag Lab   Glucose 132 70 - 99 mg/dL H 170            Glucose by meter [[id number]] (Abnormal)  Resulted: 03/24/18 2155, Result status: Final result    Resulting lab: POINT OF CARE TEST, GLUCOSE     Specimen Information    Type Source Collected On     03/24/18 2135          Components       Value Reference Range Flag Lab   Glucose 234 70 - 99 mg/dL H 170            Glucose by meter [[id number]] (Abnormal)  Resulted: 03/24/18 1737, Result status: Final result    Resulting lab: POINT OF CARE TEST, GLUCOSE     Specimen Information    Type Source Collected On     03/24/18 1715          Components       Value Reference Range Flag Lab   Glucose 246 70 - 99 mg/dL H 170            Hemoglobin [[id number]] (Abnormal)  Resulted: 03/24/18 1713, Result status: Final result    Ordering provider: Micky Moya MD  03/24/18 1554 Resulting lab: [facility]    Specimen Information    Type Source Collected On   Blood  03/24/18 1656          Components       Value Reference Range Flag Lab   Hemoglobin 9.8 13.3 - 17.7 g/dL L FrStHsLb             Glucose by meter [[id number]] (Abnormal)  Resulted: 03/24/18 1225, Result status: Final result    Resulting lab: POINT OF CARE TEST, GLUCOSE     Specimen Information    Type Source Collected On     03/24/18 1213          Components       Value Reference Range Flag Lab   Glucose 221 70 - 99 mg/dL H 170            Glucose by meter [[id number]] (Abnormal)  Resulted: 03/24/18 0838, Result status: Final result    Resulting lab: POINT OF CARE TEST, GLUCOSE     Specimen Information    Type Source Collected On     03/24/18 0821          Components       Value Reference Range Flag Lab   Glucose 142 70 - 99 mg/dL H 170            CBC with platelets [[id number]] (Abnormal)  Resulted: 03/24/18 0632, Result status: Final result    Ordering provider: Estela Mcelroy MD  03/24/18 0001 Resulting lab: [facility]    Specimen Information    Type Source Collected On   Blood  03/24/18 0600          Components       Value Reference Range Flag Lab   WBC 4.6 4.0 - 11.0 10e9/L  FrStHsLb   RBC Count 2.11 4.4 - 5.9 10e12/L L FrStHsLb   Hemoglobin 7.6 13.3 - 17.7 g/dL L FrStHsLb   Hematocrit 23.9 40.0 - 53.0 % L FrStHsLb    78 - 100 fl H FrStHsLb   MCH 36.0 26.5 - 33.0 pg H FrStHsLb   MCHC 31.8 31.5 - 36.5 g/dL  FrStHsLb   RDW 19.1 10.0 - 15.0 % H FrStHsLb   Platelet Count 72 150 - 450 10e9/L L FrStHsLb            Testing Performed By     Lab - Abbreviation Name Director Address Valid Date Range    14 - FrStHsLb [facility] Unknown [address] [place] [address] 05/08/15 1057 - Present    170 - Unknown POINT OF CARE TEST, GLUCOSE Unknown Unknown 10/31/11 1114 - Present            Unresulted Labs (24h ago through future)    Start       Ordered    03/26/18 0600  Creatinine  DAILY,   Routine     Comments:  Last Lab Result: Creatinine (mg/dL)       Date                     Value                 03/23/2018               1.06             ----------    03/25/18 1345    03/26/18 0600  CBC with platelets  "differential  DAILY,   Routine     Comments:  Last Lab Result: Hemoglobin (g/dL)       Date                     Value                 03/25/2018               9.8 (L)          ----------    03/25/18 1515    03/26/18 0600  Platelet count  DAILY,   Routine     Comments:  If no result is listed, this lab has not been done the past 365 days. LATEST LAB RESULT: Platelet Count (10e9/L)       Date                     Value                 03/25/2018               82 (L)           ----------    03/25/18 1618    03/26/18 0600  Creatinine  EVERY THREE DAYS,   Routine     Comments:  Last Lab Result: Creatinine (mg/dL)       Date                     Value                 03/23/2018               1.06             ----------    03/25/18 1618    Unscheduled  Potassium  (Potassium Replacement - \"Standard\" - For K levels less than 3.4 mmol/L - UU,UR,UA,RH,SH,PH,WY )  CONDITIONAL (SPECIFY),   Routine     Comments:  Obtain Potassium Level for these conditions:  *IF no potassium result within 24 hours before initiation of order set, draw potassium level with next lab collect.    *2 HOURS AFTER last IV potassium replacement dose and 4 hours after an oral replacement dose.  *Next morning after potassium dose.     Repeat Potassium Replacement if necessary.    03/22/18 1719    Unscheduled  Magnesium  (Magnesium Replacement -  Adult - \"Standard\" - Replacement for all levels less than 1.6 mg/dL )  CONDITIONAL (SPECIFY),   Routine     Comments:  Obtain Magnesium Level for these conditions:  *IF no magnesium result within 24 hrs before initiation of order set, draw magnesium level with next lab collect.    *2 HOURS AFTER last magnesium replacement dose when magnesium replacement given for level less than 1.6   *Next morning after magnesium dose.     Repeat Magnesium Replacement if necessary.    03/22/18 1719         Imaging Results - 3 Days      XR Chest 2 Views [[id number]]  Resulted: 03/25/18 1240, Result status: Final result    Ordering " provider: Chanel Moya MD  18 1114 Resulted by: Angelique Ricardo MD    Performed: 18 1215 - 18 1221 Resulting lab: RADIOLOGY RESULTS    Narrative:       CHEST TWO VIEWS   3/25/2018 12:21 PM     HISTORY: Acute respiratory failure with right basal crackles, rule out  aspiration pneumonia.     COMPARISON: 2018.      Impression:       IMPRESSION: New right basilar consolidation with right pleural  effusion concerning for pneumonia. There is also small left pleural  effusion and mild left basilar opacity. No pneumothorax visualized.  Cardiac silhouette is unchanged. There are atherosclerotic  calcifications of the aortic arch.    ANGELIQUE RICARDO MD      Testing Performed By     Lab - Abbreviation Name Director Address Valid Date Range    104 - Rad Rslts RADIOLOGY RESULTS Unknown Unknown 05 1553 - Present               ECG/EMG Results      ECHO LIMITED WITH OPTISON [[id number]]  Resulted: 18 1039, Result status: Edited Result - FINAL    Ordering provider: Chanel Moya MD  18 0005 Resulted by: Estuardo Louie MD    Performed: 18 1100 - 18 1101 Resulting lab: RADIOLOGY RESULTS    Narrative:       [id number]  [facility]  [id number]  [id number]^SILVIA^CHANEL^ANDIE           [facility]  Echocardiography Laboratory  [address] [place] [address]        Name: JACOB MEDELLIN  MRN: [id number]  : 1926  Study Date: 2018 10:39 AM  Age: 89 yrs  Gender: Male  Patient Location: [facility]  Reason For Study: CHF  Ordering Physician: CHANEL MOYA  Referring Physician: Dickson Reich  Performed By: Jeanie Reaves     BSA: 2.1 m2  Height: 70 in  Weight: 209 lb  HR: 105  BP: 156/80 mmHg  _____________________________________________________________________________  __        Procedure  Limited Portable Echo Adult. Contrast Optison.  _____________________________________________________________________________  __         Interpretation Summary     Left ventricular systolic function is normal.The visual ejection fraction is  estimated at 55-60%.  The right ventricular systolic function is normal.  The left atrium is mildly dilated.  There is mild (1+) mitral regurgitation.  _____________________________________________________________________________  __        Left Ventricle  The left ventricle is normal in size. There is normal left ventricular wall  thickness. Left ventricular systolic function is normal. The visual ejection  fraction is estimated at 55-60%. Left ventricular diastolic function is  indeterminate. No regional wall motion abnormalities noted.     Right Ventricle  The right ventricle is normal size. The right ventricular systolic function is  normal.     Atria  The left atrium is mildly dilated. Right atrial size is normal. There is no  color Doppler evidence of an atrial shunt.     Mitral Valve  There is mild (1+) mitral regurgitation.        Tricuspid Valve  Right ventricular systolic pressure could not be approximated due to  inadequate tricuspid regurgitation.     Aortic Valve  The aortic valve is not well visualized. The aortic valve is trileaflet. No  aortic regurgitation is present. No aortic stenosis is present.     Pulmonic Valve  There is trace to mild pulmonic valvular regurgitation.     Vessels  Dilation of the inferior vena cava is present with normal respiratory  variation in diameter.     Pericardium  There is no pericardial effusion.     _____________________________________________________________________________  __  MMode/2D Measurements & Calculations  TAPSE: 2.1 cm        Doppler Measurements & Calculations  MV E max gideon: 95.5 cm/sec  MV A max gideon: 80.6 cm/sec  MV E/A: 1.2     MV dec time: 0.17 sec  E/E' avg: 15.5  Lateral E/e': 11.1  Medial E/e': 20.0           _____________________________________________________________________________  __           Report approved by: Daniele Celis  03/26/2018 02:53 PM       1    Type Source Collected On     03/26/18 1039          View Image (below)        Echocardiogram Complete [[id number]]  Resulted: 03/26/18 1101, Result status: In process    Ordering provider: Micky Moya MD  03/26/18 0005 Performed: 03/26/18 1100 - 03/26/18 1100    Resulting lab: [facility]                   Encounter-Level Documents:     There are no encounter-level documents.      Order-Level Documents:     There are no order-level documents.

## 2018-03-22 NOTE — IP AVS SNAPSHOT
[facility]    [address] [place] [address], [address]    [place] [address]    Phone:  [phone]                                       After Visit Summary   3/22/2018    Roc Parkinson    MRN: [id number]           After Visit Summary Signature Page     I have received my discharge instructions, and my questions have been answered. I have discussed any challenges I see with this plan with the nurse or doctor.    ..........................................................................................................................................  Patient/Patient Representative Signature      ..........................................................................................................................................  Patient Representative Print Name and Relationship to Patient    ..................................................               ................................................  Date                                            Time    ..........................................................................................................................................  Reviewed by Signature/Title    ...................................................              ..............................................  Date                                                            Time

## 2018-03-22 NOTE — IP AVS SNAPSHOT
MRN:[id number]                      After Visit Summary   3/22/2018    Roc Parkinson    MRN: [id number]           Thank you!     Thank you for choosing [facility] for your care. Our goal is always to provide you with excellent care. Hearing back from our patients is one way we can continue to improve our services. Please take a few minutes to complete the written survey that you may receive in the mail after you visit with us. Thank you!        Patient Information     Date Of Birth          7/7/1926        About your hospital stay     You were admitted on:  March 22, 2018 You last received care in the:  [facility]    You were discharged on:  March 27, 2018       Who to Call     For medical emergencies, please call 911.  For non-urgent questions about your medical care, please call your primary care provider or clinic, [phone]          Attending Provider     Provider Specialty    Piper Flores MD Emergency Medicine    [facility], Nelli CHIU MD Emergency Medicine    Samir, Manjeet ARRIAZA MD Internal Medicine       Primary Care Provider Office Phone # Fax #    Dickson Clark Reich [phone]      After Care Instructions     Activity - Up with assistive device           Activity - Up with nursing assistance           Advance Diet as Tolerated       Follow this diet upon discharge: Orders Placed This Encounter      Dysphagia Diet Level 3 Advanced Thin Liquids (water, ice chips, juice, milk gelatin, ice cream, etc)            Fall precautions           General info for SNF       Length of Stay Estimate: Short Term Care: Estimated # of Days <30  Condition at Discharge: Improving  Level of care:skilled   Rehabilitation Potential: Fair  Admission H&P remains valid and up-to-date: Yes  Recent Chemotherapy: N/A  Use Nursing Home Standing Orders: Yes            Glucose monitor nursing POCT       Before meals and at bedtime            Intake and output        Every shift            Mantoux instructions       Give two-step Mantoux (PPD) Per Facility Policy Yes                  Follow-up Appointments     Follow Up and recommended labs and tests       Follow up with Nursing home physician.  No follow up labs or test are needed.  Recommend follow up Chest X-ray in 6 weeks.                  Your next 10 appointments already scheduled     Mar 29, 2018  8:00 AM CDT   Level 6 with SH INFUSION CHAIR 5   [facility] ([facility])    [facility] [place] [id number]  [place] [address]   [phone]            Mar 29, 2018 10:00 AM CDT   Return Visit with ALEXANDER Salazar CNP   [facility] ([facility])    [facility] [place] [id number]  [place] [address]   [phone]            Apr 05, 2018  7:30 AM CDT   Level 6 with SH INFUSION CHAIR 1   [facility] ([facility])    [facility] [place] [id number]  [place] [address]   [phone]            Apr 11, 2018  8:00 AM CDT   Level 6 with SH INFUSION CHAIR 6   [facility] ([facility])    [facility]  [address]  [place] [address]   [phone]            Apr 18, 2018  8:00 AM CDT   Level 6 with SH INFUSION CHAIR 4   [facility] ([facility])    [facility]  [address]  [place] [address]   [phone]            Apr 25, 2018  8:00 AM CDT   Level 6 with SH INFUSION CHAIR 6   [facility] ([facility])    [facility]  [address]  [place] [address]   [phone]            Apr 25, 2018  8:45 AM CDT   Return Visit with Gretel Quinn MD   [facility] ([facility]  "[facility])    [facility] [place]  [address]  [place] [address]   [phone]            May 09, 2018  8:00 AM CDT   Level 6 with SH INFUSION CHAIR 5   [facility] ([facility])    [facility] [place]  [address]  [place] [address]   [phone]            May 16, 2018  8:00 AM CDT   Level 6 with SH INFUSION CHAIR 6   [facility] ([facility])    [facility] [place]  [address]  [place] [address]   [phone]            Jun 06, 2018  1:00 PM CDT   Level 1 with SH INFUSION CHAIR 13   [facility] ([facility])    [facility] [place]  [address]  [place] [address]   [phone]              Additional Services     Occupational Therapy Adult Consult       Evaluate and treat as clinically indicated.    Reason:  deconditioning            Physical Therapy Adult Consult       Evaluate and treat as clinically indicated.    Reason:  deconditioning                  Pending Results     No orders found from 3/20/2018 to 3/23/2018.            Statement of Approval     Ordered          03/27/18 1125  I have reviewed and agree with all the recommendations and orders detailed in this document.  EFFECTIVE NOW     Approved and electronically signed by:  Cindy Kitchen MD             Admission Information     Date & Time Provider Department Dept. Phone    3/22/2018 Manjeet Dawson MD [facility] [phone]      Your Vitals Were     Blood Pressure Pulse Temperature Respirations Height Weight    160/83 (BP Location: Right arm) 66 97.6  F (36.4  C) (Oral) 18 1.778 m (5' 10\") 90.3 kg (199 lb)    Pulse Oximetry BMI (Body Mass Index)                94% 28.55 kg/m2          MyChart Information     [employer] lets you send messages to your doctor, view " "your test results, renew your prescriptions, schedule appointments and more. To sign up, go to [email or web address] . Click on \"Log in\" on the left side of the screen, which will take you to the Welcome page. Then click on \"Sign up Now\" on the right side of the page.     You will be asked to enter the access code listed below, as well as some personal information. Please follow the directions to create your username and password.     Your access code is: [email or web address]  Expires: 2018 11:49 AM     Your access code will  in 90 days. If you need help or a new code, please call your [facility] clinic or [phone].        Care EveryWhere ID     This is your Care EveryWhere ID. This could be used by other organizations to access your [facility] medical records  [id number]        Equal Access to Services     SANDRA [facility] : Jeanne Driver, consuelo zamarripa, alyce thomason, melita shaw . So [facility] [phone].    ATENCIÓN: Si habla español, tiene a nolan disposición servicios gratuitos de asistencia lingüística. Llame al [phone].    We comply with applicable federal civil rights laws and [place] laws. We do not discriminate on the basis of race, color, national origin, age, disability, sex, sexual orientation, or gender identity.               Review of your medicines      START taking        Dose / Directions    guaiFENesin-dextromethorphan 100-10 MG/5ML syrup   Commonly known as:  ROBITUSSIN DM   Used for:  Cough        Dose:  5 mL   Take 5 mLs by mouth every 6 hours as needed for cough or congestion   Quantity:  560 mL   Refills:  0       levofloxacin 500 MG tablet   Commonly known as:  LEVAQUIN   Used for:  Pneumonia of right lung due to infectious organism, unspecified part of lung        Dose:  500 mg   Start taking on:  3/28/2018   Take 1 tablet (500 mg) by mouth daily for 7 days   Quantity:  7 tablet   Refills:  0       metoprolol succinate 50 MG " 24 hr tablet   Commonly known as:  TOPROL-XL   Used for:  Atrial fibrillation with rapid ventricular response (H)        Dose:  50 mg   Start taking on:  3/28/2018   Take 1 tablet (50 mg) by mouth daily   Quantity:  30 tablet   Refills:  0         CONTINUE these medicines which may have CHANGED, or have new prescriptions. If we are uncertain of the size of tablets/capsules you have at home, strength may be listed as something that might have changed.        Dose / Directions    amLODIPine 10 MG tablet   Commonly known as:  NORVASC   This may have changed:  See the new instructions.   Used for:  Essential hypertension        Dose:  10 mg   Start taking on:  3/28/2018   Take 1 tablet (10 mg) by mouth daily   Quantity:  30 tablet   Refills:  0       insulin detemir 100 UNIT/ML injection   Commonly known as:  LEVEMIR FLEXPEN/FLEXTOUCH   This may have changed:  how much to take   Used for:  Type 2 diabetes mellitus with diabetic chronic kidney disease, unspecified CKD stage, unspecified long term insulin use status (H), Uncontrolled type 2 diabetes mellitus with hyperglycemia, unspecified long term insulin use status (H), Type 2 diabetes mellitus with stage 3 chronic kidney disease, with long-term current use of insulin (H)        Dose:  10 Units   Inject 10 Units Subcutaneous every morning (before breakfast)   Quantity:  15 mL   Refills:  1         CONTINUE these medicines which have NOT CHANGED        Dose / Directions    acyclovir 400 MG tablet   Commonly known as:  ZOVIRAX   Used for:  Multiple myeloma not having achieved remission (H)        Dose:  400 mg   Take 1 tablet (400 mg) by mouth 2 times daily Start 1 week prior to daratumumab and continue for 3 months after treatment is complete.   Quantity:  60 tablet   Refills:  11       * B-D U/F 31G X 8 MM   Generic drug:  insulin pen needle        Refills:  0       * B-D U/F 31G X 8 MM   Used for:  Type 2 diabetes mellitus with stage 3 chronic kidney disease, with  long-term current use of insulin (H)   Generic drug:  insulin pen needle        USE 5 PEN NEEDLES PER DAY OR AS DIRECTED   Quantity:  500 each   Refills:  1       Blood Glucose Monitoring Suppl Misc        3 times daily (before meals)   Refills:  0       dexamethasone 4 MG tablet   Commonly known as:  DECADRON        Dose:  20 mg   Take 20 mg by mouth See Admin Instructions Take 20mg on day 1,2,8,9,15,16,22 & 23 before Carfilzomib dose.   Refills:  0       ferrous sulfate 325 (65 FE) MG tablet   Commonly known as:  IRON        Dose:  325 mg   Take 325 mg by mouth daily (with breakfast)   Refills:  0       * FREESTYLE LITE test strip   Used for:  Uncontrolled type 1 diabetes mellitus with stage 3 chronic kidney disease (H)   Generic drug:  blood glucose monitoring        USE 1 STRIP TO TEST TWICE DAILY.   Quantity:  200 strip   Refills:  1       * FREESTYLE LITE test strip   Used for:  Uncontrolled type 1 diabetes mellitus with stage 3 chronic kidney disease (H)   Generic drug:  blood glucose monitoring        USE TO TEST FOUR TIMES DAILY   Quantity:  400 strip   Refills:  0       furosemide 20 MG tablet   Commonly known as:  LASIX   Used for:  Multiple myeloma not having achieved remission (H)        Dose:  20 mg   Take 1 tablet (20 mg) by mouth daily   Quantity:  90 tablet   Refills:  1       gemfibrozil 600 MG tablet   Commonly known as:  LOPID   Used for:  Hyperlipidemia        TAKE 1 TABLET BY MOUTH TWICE DAILY   Quantity:  180 tablet   Refills:  1       * INSULIN ASPART SC        Dose:  13 Units   Inject 13 Units Subcutaneous every morning   Refills:  0       * INSULIN ASPART SC        Dose:  11 Units   Inject 11 Units Subcutaneous daily (before lunch)   Refills:  0       * INSULIN ASPART SC        Inject Subcutaneous At Bedtime Inject 4 unit subcutaneously at bedtime for Diabetes II Ok to use Humalog insulin with same order details   Refills:  0       * INSULIN ASPART SC        Dose:  9 Units   Inject 9 Units  Subcutaneous daily (with dinner)   Refills:  0       * INSULIN ASPART SC        Inject Subcutaneous 4 times daily Sliding scale: 140-175 1 unit 176-210 2 units 211-245 3 units 246-280 4 units 281-315 5 units 316-350 6 units 351-385 7 units 386-420 8 units >420 9 units   Refills:  0       levothyroxine 25 MCG tablet   Commonly known as:  SYNTHROID/LEVOTHROID   Used for:  Acquired hypothyroidism        TAKE 1 TABLET BY MOUTH EVERY DAY   Quantity:  90 tablet   Refills:  0       LISINOPRIL PO        Dose:  15 mg   Take 15 mg by mouth daily   Refills:  0       LORazepam 0.5 MG tablet   Commonly known as:  ATIVAN   Used for:  Multiple myeloma not having achieved remission (H)        Dose:  0.5 mg   Take 1 tablet (0.5 mg) by mouth every 4 hours as needed (Anxiety, Nausea/Vomiting or Sleep)   Quantity:  4 tablet   Refills:  5       METHOCARBAMOL PO        Dose:  750 mg   Take 750 mg by mouth 3 times daily as needed for muscle spasms   Refills:  0       omeprazole 20 MG CR capsule   Commonly known as:  priLOSEC   Used for:  Congenital hiatus hernia        TAKE 1 CAPSULE BY MOUTH EVERY DAY   Quantity:  90 capsule   Refills:  3       polyethylene glycol Packet   Commonly known as:  MIRALAX/GLYCOLAX   Used for:  Slow transit constipation        Dose:  17 g   Take 17 g by mouth 2 times daily as needed (constipation)   Quantity:  7 packet   Refills:  0       prochlorperazine 10 MG tablet   Commonly known as:  COMPAZINE   Used for:  Multiple myeloma not having achieved remission (H)        Dose:  10 mg   Take 1 tablet (10 mg) by mouth every 6 hours as needed (Nausea/Vomiting)   Quantity:  30 tablet   Refills:  5       Selenium 200 MCG Tabs        Dose:  100 mcg   Take 100 mcg by mouth daily. Pt takes one half tab of the 200 mcg daily   Refills:  0       VITAMIN C PO        Dose:  500 mg   Take 500 mg by mouth daily   Refills:  0       VITAMIN D (CHOLECALCIFEROL) PO        Dose:  1000 Units   Take 1,000 Units by mouth   Refills:  0        * Notice:  This list has 9 medication(s) that are the same as other medications prescribed for you. Read the directions carefully, and ask your doctor or other care provider to review them with you.      STOP taking     GLIPIZIDE PO           metoprolol tartrate 25 MG tablet   Commonly known as:  LOPRESSOR                Where to get your medicines      Some of these will need a paper prescription and others can be bought over the counter. Ask your nurse if you have questions.     You don't need a prescription for these medications     amLODIPine 10 MG tablet    guaiFENesin-dextromethorphan 100-10 MG/5ML syrup    insulin detemir 100 UNIT/ML injection    levofloxacin 500 MG tablet    metoprolol succinate 50 MG 24 hr tablet         Information about where to get these medications is not yet available     ! Ask your nurse or doctor about these medications     LORazepam 0.5 MG tablet                Protect others around you: Learn how to safely use, store and throw away your medicines at www.disposemymeds.org.        ANTIBIOTIC INSTRUCTION     You've Been Prescribed an Antibiotic - Now What?  Your healthcare team thinks that you or your loved one might have an infection. Some infections can be treated with antibiotics, which are powerful, life-saving drugs. Like all medications, antibiotics have side effects and should only be used when necessary. There are some important things you should know about your antibiotic treatment.      Your healthcare team may run tests before you start taking an antibiotic.    Your team may take samples (e.g., from your blood, urine or other areas) to run tests to look for bacteria. These test can be important to determine if you need an antibiotic at all and, if you do, which antibiotic will work best.      Within a few days, your healthcare team might change or even stop your antibiotic.    Your team may start you on an antibiotic while they are working to find out what is making  you sick.    Your team might change your antibiotic because test results show that a different antibiotic would be better to treat your infection.    In some cases, once your team has more information, they learn that you do not need an antibiotic at all. They may find out that you don't have an infection, or that the antibiotic you're taking won't work against your infection. For example, an infection caused by a virus can't be treated with antibiotics. Staying on an antibiotic when you don't need it is more likely to be harmful than helpful.      You may experience side effects from your antibiotic.    Like all medications, antibiotics have side effects. Some of these can be serious.    Let you healthcare team know if you have any known allergies when you are admitted to the hospital.    One significant side effect of nearly all antibiotics is the risk of severe and sometimes deadly diarrhea caused by Clostridium difficile (C. Difficile). This occurs when a person takes antibiotics because some good germs are destroyed. Antibiotic use allows C. diificile to take over, putting patients at high risk for this serious infection.    As a patient or caregiver, it is important to understand your or your loved one's antibiotic treatment. It is especially important for caregivers to speak up when patients can't speak for themselves. Here are some important questions to ask your healthcare team.    What infection is this antibiotic treating and how do you know I have that infection?    What side effects might occur from this antibiotic?    How long will I need to take this antibiotic?    Is it safe to take this antibiotic with other medications or supplements (e.g., vitamins) that I am taking?     Are there any special directions I need to know about taking this antibiotic? For example, should I take it with food?    How will I be monitored to know whether my infection is responding to the antibiotic?    What tests may help  to make sure the right antibiotic is prescribed for me?      Information provided by:  www.cdc.gov/getsmart  U.S. Department of Health and Human Services  Centers for disease Control and Prevention  National Center for Emerging and Zoonotic Infectious Diseases  Division of Healthcare Quality Promotion             Medication List: This is a list of all your medications and when to take them. Check marks below indicate your daily home schedule. Keep this list as a reference.      Medications           Morning Afternoon Evening Bedtime As Needed    acyclovir 400 MG tablet   Commonly known as:  ZOVIRAX   Take 1 tablet (400 mg) by mouth 2 times daily Start 1 week prior to daratumumab and continue for 3 months after treatment is complete.   Last time this was given:  400 mg on 3/27/2018  9:45 AM                                amLODIPine 10 MG tablet   Commonly known as:  NORVASC   Take 1 tablet (10 mg) by mouth daily   Start taking on:  3/28/2018   Last time this was given:  10 mg on 3/27/2018  9:45 AM                                * B-D U/F 31G X 8 MM   Generic drug:  insulin pen needle                                * B-D U/F 31G X 8 MM   USE 5 PEN NEEDLES PER DAY OR AS DIRECTED   Generic drug:  insulin pen needle                                Blood Glucose Monitoring Suppl Misc   3 times daily (before meals)                                dexamethasone 4 MG tablet   Commonly known as:  DECADRON   Take 20 mg by mouth See Admin Instructions Take 20mg on day 1,2,8,9,15,16,22 & 23 before Carfilzomib dose.   Last time this was given:  4 mg on 3/26/2018  8:17 AM                                ferrous sulfate 325 (65 FE) MG tablet   Commonly known as:  IRON   Take 325 mg by mouth daily (with breakfast)   Last time this was given:  325 mg on 3/27/2018  9:45 AM                                * FREESTYLE LITE test strip   USE 1 STRIP TO TEST TWICE DAILY.   Generic drug:  blood glucose monitoring                                 * FREESTYLE LITE test strip   USE TO TEST FOUR TIMES DAILY   Generic drug:  blood glucose monitoring                                furosemide 20 MG tablet   Commonly known as:  LASIX   Take 1 tablet (20 mg) by mouth daily                                gemfibrozil 600 MG tablet   Commonly known as:  LOPID   TAKE 1 TABLET BY MOUTH TWICE DAILY   Last time this was given:  600 mg on 3/27/2018  9:46 AM                                guaiFENesin-dextromethorphan 100-10 MG/5ML syrup   Commonly known as:  ROBITUSSIN DM   Take 5 mLs by mouth every 6 hours as needed for cough or congestion                                * INSULIN ASPART SC   Inject 13 Units Subcutaneous every morning   Last time this was given:  5 Units on 3/27/2018 12:30 PM                                * INSULIN ASPART SC   Inject 11 Units Subcutaneous daily (before lunch)   Last time this was given:  5 Units on 3/27/2018 12:30 PM                                * INSULIN ASPART SC   Inject Subcutaneous At Bedtime Inject 4 unit subcutaneously at bedtime for Diabetes II Ok to use Humalog insulin with same order details   Last time this was given:  5 Units on 3/27/2018 12:30 PM                                * INSULIN ASPART SC   Inject 9 Units Subcutaneous daily (with dinner)   Last time this was given:  5 Units on 3/27/2018 12:30 PM                                * INSULIN ASPART SC   Inject Subcutaneous 4 times daily Sliding scale: 140-175 1 unit 176-210 2 units 211-245 3 units 246-280 4 units 281-315 5 units 316-350 6 units 351-385 7 units 386-420 8 units >420 9 units   Last time this was given:  5 Units on 3/27/2018 12:30 PM                                insulin detemir 100 UNIT/ML injection   Commonly known as:  LEVEMIR FLEXPEN/FLEXTOUCH   Inject 10 Units Subcutaneous every morning (before breakfast)   Last time this was given:  22 Units on 3/26/2018  8:27 AM                                levofloxacin 500 MG tablet   Commonly known as:  LEVAQUIN    Take 1 tablet (500 mg) by mouth daily for 7 days   Start taking on:  3/28/2018   Last time this was given:  500 mg on 3/27/2018 12:31 PM                                levothyroxine 25 MCG tablet   Commonly known as:  SYNTHROID/LEVOTHROID   TAKE 1 TABLET BY MOUTH EVERY DAY   Last time this was given:  25 mcg on 3/27/2018  9:44 AM                                LISINOPRIL PO   Take 15 mg by mouth daily                                LORazepam 0.5 MG tablet   Commonly known as:  ATIVAN   Take 1 tablet (0.5 mg) by mouth every 4 hours as needed (Anxiety, Nausea/Vomiting or Sleep)                                METHOCARBAMOL PO   Take 750 mg by mouth 3 times daily as needed for muscle spasms                                metoprolol succinate 50 MG 24 hr tablet   Commonly known as:  TOPROL-XL   Take 1 tablet (50 mg) by mouth daily   Start taking on:  3/28/2018   Last time this was given:  50 mg on 3/27/2018  9:44 AM                                omeprazole 20 MG CR capsule   Commonly known as:  priLOSEC   TAKE 1 CAPSULE BY MOUTH EVERY DAY   Last time this was given:  20 mg on 3/27/2018  9:47 AM                                polyethylene glycol Packet   Commonly known as:  MIRALAX/GLYCOLAX   Take 17 g by mouth 2 times daily as needed (constipation)                                prochlorperazine 10 MG tablet   Commonly known as:  COMPAZINE   Take 1 tablet (10 mg) by mouth every 6 hours as needed (Nausea/Vomiting)                                Selenium 200 MCG Tabs   Take 100 mcg by mouth daily. Pt takes one half tab of the 200 mcg daily                                VITAMIN C PO   Take 500 mg by mouth daily   Last time this was given:  1,000 mg on 3/27/2018  9:43 AM                                VITAMIN D (CHOLECALCIFEROL) PO   Take 1,000 Units by mouth   Last time this was given:  1,000 Units on 3/27/2018  9:42 AM                                * Notice:  This list has 9 medication(s) that are the same as other  medications prescribed for you. Read the directions carefully, and ask your doctor or other care provider to review them with you.

## 2018-03-22 NOTE — ED PROVIDER NOTES
History     Chief Complaint:  Allergic Reaction    HPI   Roc Parkinson is a 89 year old male with a history of malignant neoplasm, diabetes, heart disease,  who presents to the emergency department today for evaluation of allergic reaction. The patient was undergoing a new chemo treatment today in clinic when approximately 1.5 hours into the infusion, he complained of generalized weakness and fast heart rate. He was given medication to treat for an allergic reaction, but was still experiencing atrial fibrillation with RVR. While in the emergency department, the patient states that he no longer feels his heart racing.     Allergies:  Drug allergies reviewed. No pertinent drug allergies.     Medications:    LORazepam (ATIVAN) 0.5 MG tablet  prochlorperazine (COMPAZINE) 10 MG tablet  dexamethasone (DECADRON) 4 MG tablet  acyclovir (ZOVIRAX) 400 MG tablet  omeprazole (PRILOSEC) 20 MG CR capsule  levothyroxine (SYNTHROID/LEVOTHROID) 25 MCG tablet  NOVOLOG FLEXPEN 100 UNIT/ML soln  insulin aspart (NOVOLOG FLEXPEN) 100 UNIT/ML injection  INSULIN ASPART SC  VITAMIN D, CHOLECALCIFEROL, PO  metoprolol tartrate (LOPRESSOR) 25 MG tablet  tamsulosin (FLOMAX) 0.4 MG capsule  polyethylene glycol (MIRALAX/GLYCOLAX) Packet  insulin detemir (LEVEMIR FLEXPEN/FLEXTOUCH) 100 UNIT/ML injection  LISINOPRIL PO  amLODIPine (NORVASC) 5 MG tablet  furosemide (LASIX) 20 MG tablet  gemfibrozil (LOPID) 600 MG tablet  Ascorbic Acid (VITAMIN C PO)  ferrous sulfate (IRON) 325 (65 FE) MG tablet  Selenium 200 MCG TABS    Past Medical History:    Arthritis   Blood transfusion   Diabetes mellitus (H)   Heart disease   Hyperlipidemia   Hypertension   Hypothyroidism   Malignant neoplasm (H)    Past Surgical History:    Knee arthroplasty   Bladder biopsy  TURP and bladder scraping  Anal fistula  Left knee surgery   Phacoemulsification clear cornea with standard intraocular lens implant   Soft tissue surgery for melanoma    Family History:    Heart  "arrhythmia  Father   Paget's Disease Brother     Social History:  The patient was accompanied to the ED by EMS.  Smoking Status: Never Smoker  Smokeless Tobacco: Never Used  Alcohol Use: Negative   Marital Status:       Review of Systems   Cardiovascular: Positive for palpitations.   Neurological: Positive for weakness.   All other systems reviewed and are negative.    Physical Exam     Patient Vitals for the past 24 hrs:   BP Temp Temp src Heart Rate Resp SpO2 Height Weight   03/22/18 1615 136/80 - - 107 19 96 % - -   03/22/18 1600 143/64 - - 106 24 96 % - -   03/22/18 1545 - - - 112 16 97 % - -   03/22/18 1530 129/81 - - 99 18 96 % - -   03/22/18 1515 128/82 - - - - - - -   03/22/18 1500 127/88 - - 141 21 96 % - -   03/22/18 1445 136/85 - - - 19 95 % - -   03/22/18 1430 (!) 137/91 - - 134 23 95 % - -   03/22/18 1415 142/79 - - 134 20 96 % - -   03/22/18 1400 125/84 - - - - - - -   03/22/18 1345 137/83 - - 137 25 94 % - -   03/22/18 1328 - 100  F (37.8  C) Oral - - - - -   03/22/18 1325 108/84 - - 141 18 91 % 1.778 m (5' 10\") 95.3 kg (210 lb)      Physical Exam  General: Resting on the gurney  Head:  The scalp, face, and head appear normal  Mouth/Throat: Mucus membranes are moist  CV:  Rapid irregular rate    Normal S1 and S2  No pathological murmur   Resp:  Breath sounds clear and equal bilaterally    Non-labored, no retractions or accessory muscle use    No coarseness    No wheezing   GI:  Abdomen is soft, no rigidity    No tenderness to palpation  MS:  Normal motor assessment of all extremities.    Good capillary refill noted.    Lower extremities without marked edema, redness, swelling, or excess warmth.  Skin:  No rash or lesions noted.  Neuro: Speech is normal and fluent. No apparent deficit. Cranial nerves II-XII intact. Sensations intact. Strengths intact x4.  Psych:  Awake. Alert.  Normal affect.      Appropriate interactions.    Emergency Department Course     ECG:  ECG taken at 1329  Atrial " fibrillation with rapid ventricular response  Right bundle branch block  Abnormal ECG  Rate 140 bpm. NM interval * ms. QRS duration 134 ms. QT/QTc 338/516 ms. P-R-T axes * 26 -18.    Laboratory:  Laboratory findings were communicated with the patient who voiced understanding of the findings.    Lactic acid whole blood (Collected 1355): 2.2 (H)  CBC: WBC 5.6, HGB 8.6 (L), PLT 91 (L)  CMP: Glucose (Collected 1335) 168 (H), Albumin 2.7 (L) o/w WNL (Creatinine 0.95)  Troponin I (Collected 1335) <0.015     Interventions:  1413 NS 1000 mL IV   1413 Cardizem 10 mg IV   1535 Cardizem 5 mg/hr IV   1516 Cardizem 10 mg IV     Emergency Department Course:    Nursing notes and vitals reviewed.    1405 I performed an exam of the patient as documented above.     IV was inserted and blood was drawn for laboratory testing, results above.      IV was inserted and blood was drawn for laboratory testing, results above.      1533 I personally reviewed the EKG and lab results with the patient and family and answered all related questions prior to admission. I discussed the treatment plan with the patient. They expressed understanding of this plan and consented to admission.     1535 I discussed the patient with Dr. Manjeet Dawson, who will admit the patient to a monitored bed for further evaluation and treatment.     Impression & Plan      Medical Decision Making:  Roc Parkinson is a 89 year old male who presents for evaluation of atrial fibrillation with RVR during a chemo infusion. Based on chronicity of symptoms and unclear nature of onset, I elected to attempt to control rate instead of rhythm control with diltiazem infusion.  This was successful in controlling rate.  Patient with normal blood pressure and does not appear hemodynamically compromised.  Lab evaluation was unremarkable, no evidence of acute anemia ronic anemia is due to myelodisplastic disorder) or infectious process. I doubt acute coronary syndrome, thyroid issues, PE,  dissection, drug ingestion, acute electrolyte imbalance, etc.  Will admit to telemetry for further cares.      Diagnosis:    ICD-10-CM    1. Atrial fibrillation with rapid ventricular response (H) I48.91      Disposition:   The patient is admitted into the care of Dr. Dawson.     CMS Diagnoses: Lactate is greater than 1.9 due to most likely secondary to his rapid afib, at this time there is no sign of severe sepsis or septic shock. Recheck was 1.6.    Scribe Disclosure:  Marlen MENDOZA, am serving as a scribe at 1:51 PM on 3/22/2018 to document services personally performed by Nelli Meza MD, based on my observations and the provider's statements to me.       EMERGENCY DEPARTMENT       Nelli Meza MD  03/24/18 [id number]

## 2018-03-22 NOTE — IP AVS SNAPSHOT
"` [facility] CARDIAC SPECIALTY CARE: [phone]                                              INTERAGENCY TRANSFER FORM - NURSING   3/22/2018                    Hospital Admission Date: 3/22/2018  JACOB MEDELLIN   : 1926  Sex: Male        Attending Provider: Manjeet Dawson MD     Allergies:  No Known Allergies    Infection:  ESBL   Service:  HOSPITALIST    Ht:  1.778 m (5' 10\")   Wt:  90.3 kg (199 lb)   Admission Wt:  95.3 kg (210 lb)    BMI:  28.55 kg/m 2   BSA:  2.11 m 2            Patient PCP Information     Provider PCP Type    Dickson Reich MD General      Current Code Status     Date Active Code Status Order ID Comments User Context       Prior      Code Status History     Date Active Date Inactive Code Status Order ID Comments User Context    3/27/2018 11:07 AM  DNR/DNI [id number]  Cindy Kitchen MD Outpatient    3/22/2018  5:20 PM 3/27/2018 11:07 AM DNR/DNI [id number]  Manjeet Dawson MD Inpatient    2018  6:02 AM 2018  7:30 PM DNR/DNI [id number]  Martha Bobby MD Inpatient    2016  8:57 AM 2018  6:02 AM DNI [id number]  Alexandra Gay PA-C Outpatient    2016  3:47 PM 2016  8:57 AM DNI [id number]  Alexandra Gay PA-C ED    2016 10:20 AM 2016  3:47 PM Full Code [id number]  Mary Peters MD Outpatient    2016  5:58 PM 2016 10:20 AM Full Code [id number]  Alexys Fontenot MD Inpatient    8/15/2014 12:03 PM 2016  5:58 PM Full Code [id number]  Estela Mcelroy MD Outpatient    2014  1:18 PM 8/15/2014 12:03 PM Full Code [id number]  Estela Mcelroy MD Inpatient    2012  7:23 AM 2012  2:00 PM Full Code [id number]  Neeta Rincon, RN Inpatient    2012 11:23 AM 2012  7:23 AM Full Code [id number]  Alexandrea Jackson, KENDALL Inpatient      Advance Directives        Scanned docmt in ACP Activity?           Yes, scanned ACP docmt        Hospital Problems as of 3/27/2018  "             Priority Class Noted POA    Atrial fibrillation with RVR (H) Medium  3/22/2018 Yes      Non-Hospital Problems as of 3/27/2018              Priority Class Noted    Dysphagia Medium  10/22/2012    Malignant neoplasm of prostate (H) Medium  10/22/2012    Malignant neoplasm of bladder (H) Medium  10/22/2012    Essential hypertension, benign Medium  10/22/2012    Asymptomatic varicose veins Medium  10/22/2012    Congenital hiatus hernia Medium  10/22/2012    Oral lesion Medium  12/23/2012    CTS (carpal tunnel syndrome) Medium  1/28/2013    Type 2 diabetes mellitus with stage 3 chronic kidney disease, with long-term current use of insulin (H) Medium  7/29/2013    Irritable bowel syndrome Medium  10/9/2013    Vitamin D deficiency disease Medium  10/9/2013    Microalbuminuria Medium  11/12/2013    Obesity Medium  8/12/2014    UTI (urinary tract infection) w hx of recurrence Medium  8/12/2014    Iron deficiency anemia Medium  8/12/2014    Nonsmoker Medium  8/12/2014    A-fib (H) Medium  8/14/2014    Health Care Home Medium  8/19/2014    Hyperlipidemia Medium  Unknown    CKD (chronic kidney disease) stage 3, GFR 30-59 ml/min Medium  10/14/2015    Hypothyroidism Medium  2/29/2016    Long-term (current) use of anticoagulants [Z79.01] Medium  3/21/2016    Macrocytic anemia Medium  10/1/2016    GIB (gastrointestinal bleeding) Medium  11/13/2016    Multiple myeloma not having achieved remission (H) Medium  11/23/2016    Hypercalcemia Medium  12/15/2016    Hyperglycemia Medium  12/17/2016    Urinary tract infection Medium  2/14/2017    Hyperkalemia Medium  2/22/2017    ACP (advance care planning) Medium  2/28/2017    MDS (myelodysplastic syndrome) (H) Medium  5/17/2017    Chemotherapy-induced neutropenia (H) Medium  6/27/2017    Anemia in neoplastic disease Medium  6/27/2017    UTI due to extended-spectrum beta lactamase (ESBL) producing Escherichia coli Medium  1/29/2018    Essential hypertension Medium  1/29/2018     Physical deconditioning Medium  1/29/2018      Immunizations     Name Date      Influenza (High Dose) 3 valent vaccine 10/04/17     Influenza (High Dose) 3 valent vaccine 10/01/16     Influenza (High Dose) 3 valent vaccine 10/01/15     Influenza (IIV3) PF 09/25/14     Influenza (IIV3) PF 10/01/13     Pneumo Conj 13-V (2010&after) 10/01/15     Pneumococcal 23 valent 01/01/10     TD (ADULT, 7+) 08/11/09          END      ASSESSMENT     Discharge Profile Flowsheet     EXPECTED DISCHARGE     Patient's communication style  spoken language (English or Bilingual) 03/22/18 1323    Expected Discharge Date  03/27/18 03/25/18 1547   FINAL RESOURCES      DISCHARGE NEEDS ASSESSMENT     Resources List  Home Care 02/09/18 0958    Concerns To Be Addressed  all concerns addressed in this encounter 01/19/17 1051   Other Resources  Other (see comment) (Diabetic education) 02/09/18 1028    Equipment Currently Used at Home  walker, rolling;grab bar 03/26/18 1547   Existing Resources/Services  None 08/19/14 1516    Transportation Available  car 03/26/18 1547   SKIN      # of Referrals Placed by CTS  Senior Linkage Line;Post Acute Facilities 01/23/18 1428   Inspection of bony prominences  Full 03/26/18 2230    Equipment Used at Home  rolling walker 11/18/12 1444   Full except areas not inspected   Hip, left;Hip, right;Buttock, left;Buttock, right;Sacrum;Coccyx 03/25/18 0115    GASTROINTESTINAL (ADULT,PEDIATRIC,OB)     Inspection under devices  Full 03/26/18 2230    GI WDL  ex 03/27/18 1338   Skin WDL  ex 03/27/18 1338    Abdominal Appearance  obese 03/27/18 1338   Skin Color/Characteristics  bruised (ecchymotic);pale 03/27/18 1338    Last Bowel Movement  03/25/18 03/25/18 1841   SAFETY      GI Signs/Symptoms  constipation 03/25/18 1821   Safety WDL  WDL 03/27/18 1338    Passing flatus  yes 03/27/18 1338   All Alarms  alarm(s) activated and audible 03/27/18 1338    COMMUNICATION ASSESSMENT                        Assessment WDL (Within  "Defined Limits) Definitions           Safety WDL     Effective: 09/28/15    Row Information: <b>WDL Definition:</b> Bed in low position, wheels locked; call light in reach; upper side rails up x 2; ID band on<br> <font color=\"gray\"><i>Item=AS safety wdl>>List=AS safety wdl>>Version=F14</i></font>      Skin WDL     Effective: 09/28/15    Row Information: <b>WDL Definition:</b> Warm; dry; intact; elastic; without discoloration; pressure points without redness<br> <font color=\"gray\"><i>Item=AS skin wdl>>List=AS skin wdl>>Version=F14</i></font>      Vitals     Vital Signs Flowsheet     VITAL SIGNS     Weight  90.3 kg (199 lb) 03/27/18 0451    Temp  97.6  F (36.4  C) 03/27/18 1109   Weight Method  Standing scale 03/27/18 0451    Temp src  Oral 03/27/18 1109   Bed Scale  Standard (fitted sheet, draw sheet/ pad, cover/flat sheet, blanket, two pillows) 03/22/18 1658    Resp  18 03/27/18 1109   BSA (Calculated - sq m)  2.17 03/22/18 1326    Pulse  66 03/24/18 1902   BMI (Calculated)  30.19 03/22/18 1326    Heart Rate  71 03/27/18 1109   EKG MONITORING      Pulse/Heart Rate Source  Monitor 03/27/18 1109   Cardiac Regularity  Regular 03/26/18 2222    BP  160/83 03/27/18 1109   Cardiac Rhythm  NSR 03/26/18 2222    BP Location  Right arm 03/27/18 1109   POSITIONING      OXYGEN THERAPY     Body Position  independently positioning 03/27/18 1338    SpO2  94 % 03/27/18 1109   Head of Bed (HOB)  HOB at 20-30 degrees 03/27/18 0327    O2 Device  None (Room air) 03/27/18 1109   Chair  Recline and up in chair 03/26/18 0859    Oxygen Delivery  2 LPM 03/27/18 0750   Positioning/Transfer Devices  pillows;in use 03/27/18 0327    PAIN/COMFORT     DAILY CARE      Patient Currently in Pain  denies 03/27/18 0322   Activity Management  up in chair 03/27/18 1338    0-10 Pain Scale  0 03/22/18 1326   Activity Assistance Provided  assistance, stand-by 03/27/18 1338    Pain Location  Back 03/25/18 2200   Assistive Device Utilized  gait " "belt;walker 03/26/18 1809    Pain Orientation  Left 03/25/18 2200   Additional Documentation  Activity Device Assistance (Row) 03/24/18 2255    Pain Descriptors  Spasm 03/25/18 2200   ECG      Pain Intervention(s)  Heat applied;Repositioned 03/25/18 2200   ECG Rhythm  Normal sinus rhythm 03/26/18 2222    HEIGHT AND WEIGHT     POINT OF CARE TESTING      Height  1.778 m (5' 10\") 03/22/18 1326   Puncture Site  fingertip 03/23/18 0845    Height Method  Actual 03/22/18 1326   Bedside Glucose (mg/dl )   393 mg/dl 03/23/18 0845            Patient Lines/Drains/Airways Status    Active LINES/DRAINS/AIRWAYS     Name: Placement date: Placement time: Site: Days: Last dressing change:    Peripheral IV 03/23/18 Right 03/23/18   0028      4             Patient Lines/Drains/Airways Status    Active PICC/CVC     None            Intake/Output Detail Report     Date Intake       Output Net    Shift P.O. I.V. IV Piggyback Blood Components Total Urine Total       Noc 03/25/18 2300 - 03/26/18 0659 -- -- -- -- -- 1325 1325 -1325    Day 03/26/18 0700 - 03/26/18 1459 700 -- -- -- 700 925 925 -225    Lory 03/26/18 1500 - 03/26/18 2259 360 -- -- -- 360 325 325 35    Noc 03/26/18 2300 - 03/27/18 0659 120 -- -- -- 120 800 800 -680    Day 03/27/18 0700 - 03/27/18 1459 220 -- -- -- 220 550 550 -330      Last Void/BM       Most Recent Value    Urine Occurrence 1 at 03/25/2018 1824    Stool Occurrence 1 at 03/26/2018 1809      Case Management/Discharge Planning     Case Management/Discharge Planning Flowsheet     REFERRAL INFORMATION     Skilled Nursing Facility  [facility] 11/20/12 0843    Arrived From  home or self-care 12/18/16 1640   Skilled Nursing Facility Phone Number  [phone] 11/20/12 0843    # of Referrals Placed by CTS  Senior Linkage Line;Post Acute Facilities 01/23/18 1428   Equipment Used at Home  rolling walker 11/18/12 1444    Primary Care MD Name  Dr. Dickson Reich 11/20/16 1121   FINAL RESOURCES      LIVING " ENVIRONMENT     Equipment Currently Used at Home  walker, rolling;grab bar 03/26/18 1547    Lives With  alone 03/26/18 1547   Resources List  Home Care 02/09/18 0958    Living Arrangements  independent living facility 03/26/18 1627   Other Resources  Other (see comment) (Diabetic education) 02/09/18 1028    COPING/STRESS     Existing Resources/Services  None 08/19/14 1516    Major Change/Loss/Stressor  none 03/22/18 1745   ABUSE RISK SCREEN      Patient Personal Strengths  expressive of needs;motivated;positive attitude;strong support system 11/18/12 1421   QUESTION TO PATIENT:  Has a member of your family or a partner(now or in the past) intimidated, hurt, manipulated, or controlled you in any way?  no 03/22/18 1327    EXPECTED DISCHARGE     QUESTION TO PATIENT: Do you feel safe going back to the place where you are living?  yes 03/22/18 1327    Expected Discharge Date  03/27/18 03/25/18 1547   OBSERVATION: Is there reason to believe there has been maltreatment of a vulnerable adult (ie. Physical/Sexual/Emotional abuse, self neglect, lack of adequate food, shelter, medical care, or financial exploitation)?  no 03/22/18 1327    ASSESSMENT/CONCERNS TO BE ADDRESSED     OTHER      Concerns To Be Addressed  all concerns addressed in this encounter 01/19/17 1051   Are you depressed or being treated for depression?  No 03/22/18 1743    DISCHARGE PLANNING     HOMICIDE RISK      Transportation Available  car 03/26/18 1547   Feels Like Hurting Others  no 03/22/18 1327

## 2018-03-22 NOTE — MR AVS SNAPSHOT
After Visit Summary   3/22/2018    Roc Parkinson    MRN: [id number]           Patient Information     Date Of Birth          7/7/1926        Visit Information        Provider Department      3/22/2018 8:00 AM SH INFUSION CHAIR 3 [facility]        Today's Diagnoses     Multiple myeloma not having achieved remission (H)    -  1    Macrocytic anemia        Chronic atrial fibrillation (H)           Follow-ups after your visit        Your next 10 appointments already scheduled     Mar 23, 2018  8:00 AM CDT   Level 6 with SH INFUSION CHAIR 5   [facility] ([facility])    [facility]  [address]  [place] [address]   [phone]            Mar 29, 2018  8:00 AM CDT   Level 6 with SH INFUSION CHAIR 5   [facility] ([facility])    [facility]  [address]  [place] [address]   [phone]            Mar 29, 2018 10:00 AM CDT   Return Visit with ALEXANDER Salazar CNP   [facility] ([facility])    [facility]  [address]  [place] [address]   [phone]            Apr 05, 2018  7:30 AM CDT   Level 6 with SH INFUSION CHAIR 1   [facility] ([facility])    [facility]  [address]  [place] [address]   [phone]            Apr 11, 2018  8:00 AM CDT   Level 6 with SH INFUSION CHAIR 6   [facility] ([facility])    [facility]  [address]  [place] [address]   [phone]            Apr 18, 2018  8:00 AM CDT   Level 6 with SH INFUSION CHAIR 4   [facility] ([facility])    [facility]  [address]  [place] [address]   [phone]            Apr 25, 2018  8:00 AM CDT   Level 6 with SH INFUSION CHAIR 6   [facility] ([facility])    [facility]  [address]  [place] [address]   [phone]            Apr 25, 2018  8:45 AM CDT   Return Visit with Gretel Quinn MD   [facility] ([facility])    [facility]  [address]  [place] [address]   [phone]            May 09, 2018  8:00 AM CDT   Level 6 with SH INFUSION CHAIR 5   [facility] ([facility])    [facility]  [address]  [place] [address]   [phone]            May 16, 2018  8:00 AM CDT   Level 6 with SH INFUSION CHAIR 6   [facility] ([facility])    [facility] [place] [id number]  [place] [address]   [phone]              Future tests that were ordered for you today     Open Standing Orders        Priority Remaining Interval Expires Ordered    Red blood cell prepare order unit Routine 99/100 CONDITIONAL (SPECIFY) BLOOD  3/22/2018    Transfuse red blood cell unit Routine 99/100 TRANSFUSE 1 UNIT  3/22/2018            Who to contact     If you have questions or need follow up information about today's clinic visit or your schedule please contact [facility] directly at [phone].  Normal or non-critical lab and imaging results will be communicated to you by MyChart, letter or phone within 4 business days after the clinic has received the results. If you do not hear from us within 7 days, please contact the clinic through MyChart or phone. If you have a critical or abnormal lab result, we will notify you by phone as soon as possible.  Submit refill requests through [employer] or call your pharmacy and they will forward the  "refill request to us. Please allow 3 business days for your refill to be completed.          Additional Information About Your Visit        MyChart Information     [employer] lets you send messages to your doctor, view your test results, renew your prescriptions, schedule appointments and more. To sign up, go to [email or web address] . Click on \"Log in\" on the left side of the screen, which will take you to the Welcome page. Then click on \"Sign up Now\" on the right side of the page.     You will be asked to enter the access code listed below, as well as some personal information. Please follow the directions to create your username and password.     Your access code is: [email or web address]  Expires: 3/26/2018 12:10 PM     Your access code will  in 90 days. If you need help or a new code, please call your [facility] clinic or [phone].        Care EveryWhere ID     This is your Care EveryWhere ID. This could be used by other organizations to access your [facility] medical records  [id number]        Your Vitals Were     Pulse Temperature Respirations Height Pulse Oximetry BMI (Body Mass Index)    141 98.3  F (36.8  C) (Oral) 20 1.778 m (5' 10\") 96% 30.13 kg/m2       Blood Pressure from Last 3 Encounters:   18 108/84   18 149/85   18 110/60    Weight from Last 3 Encounters:   18 95.3 kg (210 lb)   18 95.3 kg (210 lb)   18 93.2 kg (205 lb 6.4 oz)              We Performed the Following     ABO/Rh type and screen     Blood component     CBC with platelets differential     EKG 12-lead, tracing only          Today's Medication Changes          These changes are accurate as of 3/22/18  1:36 PM.  If you have any questions, ask your nurse or doctor.               Start taking these medicines.        Dose/Directions    acyclovir 400 MG tablet   Commonly known as:  ZOVIRAX   Used for:  Multiple myeloma not having achieved remission (H)        Dose:  400 mg   Take 1 tablet (400 mg) by mouth 2 " times daily Start 1 week prior to daratumumab and continue for 3 months after treatment is complete.   Quantity:  60 tablet   Refills:  11       dexamethasone 4 MG tablet   Commonly known as:  DECADRON   Used for:  Multiple myeloma not having achieved remission (H)        Dose:  4 mg   Take 1 tablet (4 mg) by mouth daily (with breakfast) for 2 days following each daratumumab infusion   Quantity:  8 tablet   Refills:  4       LORazepam 0.5 MG tablet   Commonly known as:  ATIVAN   Used for:  Multiple myeloma not having achieved remission (H)        Dose:  0.5 mg   Take 1 tablet (0.5 mg) by mouth every 4 hours as needed (Anxiety, Nausea/Vomiting or Sleep)   Quantity:  30 tablet   Refills:  5            Where to get your medicines      These medications were sent to [facility] [address] [place] [address] [address] [facility] [address], [facility] [address]    Hours:  24-hours Phone:  [phone]     acyclovir 400 MG tablet    dexamethasone 4 MG tablet         Some of these will need a paper prescription and others can be bought over the counter.  Ask your nurse if you have questions.     Bring a paper prescription for each of these medications     LORazepam 0.5 MG tablet                Primary Care Provider Office Phone # Fax #    Dickson Reich [phone] [address]  [facility] [address]        Equal Access to Services     ELIE GAGNON AH: Hadii virginia griffino Soaimee, waaxda luqadaha, qaybta kaalmada adesadieyada, melita shaw . So [facility] [phone].    ATENCIÓN: Si habla español, tiene a nolan disposición servicios gratuitos de asistencia lingüística. Eric al [phone].    We comply with applicable federal civil rights laws and [place] laws. We do not discriminate on the basis of race, color, national origin, age, disability, sex, sexual orientation, or gender identity.            Thank you!     Thank you for  choosing [facility]  for your care. Our goal is always to provide you with excellent care. Hearing back from our patients is one way we can continue to improve our services. Please take a few minutes to complete the written survey that you may receive in the mail after your visit with us. Thank you!             Your Updated Medication List - Protect others around you: Learn how to safely use, store and throw away your medicines at www.disposemymeds.org.          This list is accurate as of 3/22/18  1:36 PM.  Always use your most recent med list.                   Brand Name Dispense Instructions for use Diagnosis    acyclovir 400 MG tablet    ZOVIRAX    60 tablet    Take 1 tablet (400 mg) by mouth 2 times daily Start 1 week prior to daratumumab and continue for 3 months after treatment is complete.    Multiple myeloma not having achieved remission (H)       amLODIPine 5 MG tablet    NORVASC    90 tablet    TAKE 1 TABLET BY MOUTH DAILY    Essential hypertension, benign       * B-D U/F 31G X 8 MM   Generic drug:  insulin pen needle           * B-D U/F 31G X 8 MM   Generic drug:  insulin pen needle     500 each    USE 5 PEN NEEDLES PER DAY OR AS DIRECTED    Type 2 diabetes mellitus with stage 3 chronic kidney disease, with long-term current use of insulin (H)       Blood Glucose Monitoring Suppl Misc      3 times daily (before meals)        dexamethasone 4 MG tablet    DECADRON    8 tablet    Take 1 tablet (4 mg) by mouth daily (with breakfast) for 2 days following each daratumumab infusion    Multiple myeloma not having achieved remission (H)       ferrous sulfate 325 (65 FE) MG tablet    IRON     Take 325 mg by mouth daily (with breakfast)        FLOMAX 0.4 MG capsule   Generic drug:  tamsulosin      Take 0.4 mg by mouth At Bedtime        * FREESTYLE LITE test strip   Generic drug:  blood glucose monitoring     200 strip    USE 1 STRIP TO TEST TWICE DAILY.    Uncontrolled type 1  diabetes mellitus with stage 3 chronic kidney disease (H)       * FREESTYLE LITE test strip   Generic drug:  blood glucose monitoring     400 strip    USE TO TEST FOUR TIMES DAILY    Uncontrolled type 1 diabetes mellitus with stage 3 chronic kidney disease (H)       furosemide 20 MG tablet    LASIX    90 tablet    Take 1 tablet (20 mg) by mouth daily    Multiple myeloma not having achieved remission (H)       gemfibrozil 600 MG tablet    LOPID    180 tablet    TAKE 1 TABLET BY MOUTH TWICE DAILY    Hyperlipidemia       * INSULIN ASPART SC      Inject Subcutaneous every morning Inject 6 unit subcutaneously one time a day for Diabetes II 100unit/mL before breakfast        * INSULIN ASPART SC      Inject Subcutaneous daily (before lunch) Inject 5 unit subcutaneously one time a day for Diabetes II before lunch        * INSULIN ASPART SC      Inject Subcutaneous At Bedtime Inject 4 unit subcutaneously at bedtime for Diabetes II Ok to use Humalog insulin with same order details        * insulin aspart 100 UNIT/ML injection    NovoLOG FLEXPEN    60 mL    Inject 4-17 units under the skin four times daily (with meals and nightly)    Uncontrolled type 2 diabetes mellitus with hyperglycemia, unspecified long term insulin use status (H), Type 2 diabetes mellitus with diabetic chronic kidney disease, unspecified CKD stage, unspecified long term insulin use status (H), Type 2 diabetes mellitus with stage 3 chronic kidney disease, with long-term current use of insulin (H)       * NovoLOG FLEXPEN 100 UNIT/ML injection   Generic drug:  insulin aspart     15 mL    INJECT 1 TO 5 UNITS UNDER THE SKIN FOUR TIMES DAILY WITH MEALS AND NIGHTLY    Uncontrolled type 2 diabetes mellitus with hyperglycemia, unspecified long term insulin use status (H)       insulin detemir 100 UNIT/ML injection    LEVEMIR FLEXPEN/FLEXTOUCH    15 mL    Inject 16 Units Subcutaneous every morning (before breakfast)    Type 2 diabetes mellitus with diabetic chronic  kidney disease, unspecified CKD stage, unspecified long term insulin use status (H), Uncontrolled type 2 diabetes mellitus with hyperglycemia, unspecified long term insulin use status (H), Type 2 diabetes mellitus with stage 3 chronic kidney disease, with long-term current use of insulin (H)       levothyroxine 25 MCG tablet    SYNTHROID/LEVOTHROID    90 tablet    TAKE 1 TABLET BY MOUTH EVERY DAY    Acquired hypothyroidism       LISINOPRIL PO      Take 15 mg by mouth daily        LORazepam 0.5 MG tablet    ATIVAN    30 tablet    Take 1 tablet (0.5 mg) by mouth every 4 hours as needed (Anxiety, Nausea/Vomiting or Sleep)    Multiple myeloma not having achieved remission (H)       metoprolol tartrate 25 MG tablet    LOPRESSOR    180 tablet    TAKE 1 TABLET BY MOUTH TWICE DAILY    Essential hypertension       omeprazole 20 MG CR capsule    priLOSEC    90 capsule    TAKE 1 CAPSULE BY MOUTH EVERY DAY    Congenital hiatus hernia       polyethylene glycol Packet    MIRALAX/GLYCOLAX    7 packet    Take 17 g by mouth 2 times daily as needed (constipation)    Slow transit constipation       prochlorperazine 10 MG tablet    COMPAZINE    30 tablet    Take 1 tablet (10 mg) by mouth every 6 hours as needed (Nausea/Vomiting)    Multiple myeloma not having achieved remission (H)       Selenium 200 MCG Tabs      Take 100 mcg by mouth daily. Pt takes one half tab of the 200 mcg daily        VITAMIN C PO      Take 1,000 mg by mouth daily        VITAMIN D (CHOLECALCIFEROL) PO      Take 1,000 Units by mouth        * Notice:  This list has 9 medication(s) that are the same as other medications prescribed for you. Read the directions carefully, and ask your doctor or other care provider to review them with you.

## 2018-03-23 NOTE — H&P
Admitted:     03/22/2018      PRIMARY CARE PROVIDER:  Dickson Reich MD      PRIMARY HEMATOLOGIST/ONCOLOGIST:  Gretel Quinn MD      CHIEF COMPLAINT:  Transferred from Oncology Clinic for treatment of AFib with RVR.      HISTORY OF PRESENT ILLNESS:  A 89-year-old gentleman with history of multiple myeloma, MDS, bladder CA, prostate CA, paroxysmal AFib, ELVIRA, HLD, HTN and numerous other medical conditions.  Mr. Parkinson was diagnosed with multiple myeloma in 11/2016.  He was initiated on chemotherapy in 12/2016 under the care of Dr. Quinn, initially treated with Velcade and dexamethasone.  Cytoxan was subsequently added in 01/2018, he was initiated on a second type of chemotherapy consisting of carfilzomib and dexamethasone, which he received from 01/02-01/18/2018 as well as Zometa.  On 03/07/2018, multiple myeloma lab results showed progression of disease.  Dr. Quinn subsequently recommended daratumumab/dexamethasone.  Today was cycle 1 day 1 of his daratumumab/dexamethasone.  He was premedicated with Tylenol, Benadryl and dexamethasone for the chemo.  During infusion of chemo (daratumumab/dexamethasone), the patient developed cough, shortness of breath, palpitations and tremors.  It was felt the patient reacted to daratumumab, thus was treated with Solu-Medrol, EpiPen and Demerol.  Stat EKG showed AFib with RVR, ventricular rate 140-150.  He was placed on O2 supplement.  911 was called and the patient was brought to [facility] ED for further evaluation.  On arrival to the ED, the patient was still in AFib with RVR.  He was initiated on diltiazem drip and was subsequently admitted.      Please note that Mr. Parkinson has a history of AFib, but was taken off chemical anticoagulation about a year ago due to a lower GI bleed.      PAST MEDICAL HISTORY:   1.  Multiple myeloma, diagnosed in 11/2016, currently on third different type of chemotherapy.   2.  Myelodysplastic syndrome.   3.  Prostate cancer.   4.  Bladder cancer.   5.   Paroxysmal AFib, no longer on chemical anticoagulation due to lower GI bleed.   6.  Iron-deficiency anemia, transfusion dependent.   7.  CKD III.   8.  Diabetes mellitus, type 2.   9.  History of ESBL UTI.   10.  Vitamin D deficiency.   11.  IBS.   12.  Hypothyroidism.   13.  Hyperlipidemia.   14.  Hyperglycemia.   15.  Hypertension.   16.  Dysphagia.   17.  Carpal tunnel syndrome.   18.  Congenital hiatal hernia.   19.  Chemo-induced neutropenia.     Past Surgical History:   Procedure Laterality Date     ARTHROPLASTY KNEE  11/5/2012    Procedure: ARTHROPLASTY KNEE;  RIGHT TOTAL KNEE ARTHROPLASTY (SMITH & NEPHEW)^;  Surgeon: Dickson Schulte MD;  Location:  OR     BIOPSY      bladder     COLONOSCOPY  2007     COLONOSCOPY N/A 11/15/2016    Procedure: COMBINED COLONOSCOPY, SINGLE OR MULTIPLE BIOPSY/POLYPECTOMY BY BIOPSY;  Surgeon: Kenneth Fulton MD;  Location:  GI     GENITOURINARY SURGERY      TURP x2 and bladder scraping     GI SURGERY      anal fistula     ORTHOPEDIC SURGERY      lt knee in nov.2009     PHACOEMULSIFICATION CLEAR CORNEA WITH STANDARD INTRAOCULAR LENS IMPLANT Left 10/25/2017    Procedure: PHACOEMULSIFICATION CLEAR CORNEA WITH STANDARD INTRAOCULAR LENS IMPLANT;  LEFT EYE PHACOEMULSIFICATION CLEAR CORNEA WITH STANDARD INTRAOCULAR LENS IMPLANT ;  Surgeon: Shady Haider MD;  Location:  EC     PHACOEMULSIFICATION CLEAR CORNEA WITH STANDARD INTRAOCULAR LENS IMPLANT Right 11/1/2017    Procedure: PHACOEMULSIFICATION CLEAR CORNEA WITH STANDARD INTRAOCULAR LENS IMPLANT;  RIGHT EYE PHACOEMULSIFICATION CLEAR CORNEA WITH STANDARD INTRAOCULAR LENS IMPLANT;  Surgeon: Shady Haider MD;  Location:  EC     SOFT TISSUE SURGERY      melanoma        ALLERGIES:  NO KNOWN DRUG ALLERGIES.      OUTPATIENT MEDICATIONS:   1.  Ativan 0.5 mg p.o. q.4 hours p.r.n.   2.  Compazine 10 mg p.o. q.6 hours p.r.n.   3.  Decadron 4 mg p.o. with breakfast for 2 days following each daratumumab infusion.   4.   Acyclovir 400 mg p.o. b.i.d., start 1 week prior to daratumumab and continue for 3 months after treatment is complete.   5.  Omeprazole 20 mg p.o. daily.   6.  Levothyroxine 25 mcg p.o. daily.   7.  NovoLog 1-5 units under the skin 4 times a day with meals and at bedtime.   8.  Aspart sliding scale insulin.   9.  Vitamin D 1000 units p.o. daily.   10.  Metoprolol 25 mg p.o. b.i.d.   11.  MiraLax 17 g powder p.o. daily.   12.  Levemir insulin 16 units subq q.a.m.   13.  Lisinopril 50 mg p.o. daily.   14.  Amlodipine 5 mg p.o. daily.   15.  Lasix 20 mg p.o. daily.   16.  Gemfibrozil 600 mg p.o. b.i.d.   17.  Vitamin C 1000 mg p.o. daily.   18.  Ferrous sulfate 325 mg p.o. daily with breakfast.   19.  Selenium 100 mg p.o. daily.      SOCIAL HISTORY:  Never smoker, denies alcohol or IV drug use.      FAMILY HISTORY:  Reviewed and noncontributory to the patient's current admission.      REVIEW OF SYSTEMS:  Ten organ systems were checked and were all negative other than what was mentioned in the HPI.      PHYSICAL EXAMINATION:   VITAL SIGNS:  Temperature is 100.0, blood pressure 129/81, heart rate , respirations 16, 97% saturation on 2 liters nasal cannula.   GENERAL:  Alert and oriented x 3, no apparent distress.   HEENT:  Normocephalic, atraumatic.  Pupils equal, round, reactive to light.   NECK:  Supple, oropharynx clear, mucous membranes moist.  No thyromegaly.   CARDIOVASCULAR:  Irregularly irregular, no murmurs, rubs or gallops.   LUNGS:  Clear to auscultation bilaterally.   ABDOMEN:  Soft, good bowel sounds, nontender, no rebound or guarding.   EXTREMITIES:  No cyanosis or clubbing.   LYMPHATICS:  Trace edema.   NEUROLOGIC:  Nonfocal.   SKIN:  No rash.   MUSCULOSKELETAL:  No calf tenderness to palpation.   PSYCHIATRY:  Mood and affect are appropriate        DIAGNOSTIC DATA:  EKG:  Personally reviewed by me.  It showed AFib with RVR, right bundle branch block, no ischemia, ventricular rate 140.      LABORATORY  DATA:  Sodium 140, potassium 3.5, chloride 108, carbon dioxide 23, BUN 24, creatinine 0.95, glucose 168, calcium 8.5, normal liver function tests except for albumin of 2.7, lactic acid mildly elevated at 2.2, WBC 5.6, hemoglobin 8.6, platelet count 91,000.      ASSESSMENT AND PLAN:   A 89-year-old gentleman with history of multiple myeloma initially diagnosed in 11/2016, MDS, prostate carcinoma, bladder carcinoma, paroxysmal atrial fibrillation, HTN, HLD, pancytopenia due to multiple myeloma and chemotherapy and numerous other medical conditions.  He was undergoing new chemotherapy, cycle 1 day 1 of daratumumab/dexamethasone when the patient acutely developed cough, shortness of breath, palpitations and tremors.  Treated with Solu-Medrol, epinephrine and Demerol.  EKG showed atrial fibrillation with rapid ventricular response, ventricular rate of 140-150s, sent to [facility] ED for further evaluation and treatment.       1.   Atrial fibrillation with rapid ventricular response:   The patient has underlying paroxysmal atrial fibrillation.  He is on metoprolol 25 mg p.o. b.i.d. for rate control.  Chemical anticoagulation discontinued about a year ago due to lower gastrointestinal bleed.  Also has a h/o transfusion-dependent anemia due to underlying multiple myeloma, thus not a candidate for chemical anticoagulation.  He converted to atrial fibrillation with rapid ventricular response today while undergoing cycle 1 day 1 of daratumumab/dexamethasone.  Will try to control his rate with diltiazem drip.  I will keep him on PTA metoprolol if blood pressure can tolerate it.  Will gently hydrate the patient with IV fluid.  Echocardiogram on 01/11/2018 showed an ejection fraction of 65%, no regional wall motion abnormalities and no hemodynamically significant valvular heart disease, thus will not repeat one today.  We will check TSH and troponin level.     2.   Multiple myeloma:    Today was cycle 1 day 1 of  daratumumab/dexamethasone due to progression of his disease.  Plan was to resume this chemotherapy tomorrow.  Will consult with Dr. Qunin of Hematology/Oncology Service.     3.   HTN and HLD:   Resume PTA Lasix, amlodipine, lisinopril, metoprolol and gemfibrozil.     4.   Diabetes mellitus, type 2:   Resume PTA Levemir.  Add medium intensity sliding scale insulin.     5.   Pancytopenia:   Likely due to underlying multiple myeloma and recent chemotherapy.  Hemoglobin is 8.6 grams today.  The patient states he is scheduled to get packed red blood cells transfusion at the Oncology Clinic tomorrow.  Will defer decision to Dr. Quinn or his colleagues.       6.   DVT prophylaxis:   Sequentials.      CODE STATUS:   DNR/DNI per prior record.      Revised account 2018  trevor         KIRT CORREA MD             D: 2018   T: 2018   MT: DAVIS      Name:     JACOB MEDELLIN   MRN:      [id number]        Account:      [id number]   :      1926        Admitted:     2018                   Document: [id number]

## 2018-03-23 NOTE — PHARMACY-ADMISSION MEDICATION HISTORY
Admission medication history interview status for the 3/22/2018  admission is complete. See EPIC admission navigator for prior to admission medications     Medication history source reliability:Good    Actions taken by pharmacist (provider contacted, etc): interview with patient and prescription bottles brought in by son.     Additional medication history information not noted on PTA med list : pt states he has not been good about taking his acyclovir.      Medication reconciliation/reorder completed by provider prior to medication history? Yes    Time spent in this activity: 30 min    Prior to Admission medications    Medication Sig Last Dose Taking? Auth Provider   dexamethasone (DECADRON) 4 MG tablet Take 20 mg by mouth See Admin Instructions Take 20mg on day 1,2,8,9,15,16,22 & 23 before Carfilzomib dose. Past Week at Unknown time Yes Unknown, Entered By History   INSULIN ASPART SC Inject 9 Units Subcutaneous daily (with dinner) 3/21/2018 at supper Yes Unknown, Entered By History   INSULIN ASPART SC Inject Subcutaneous 4 times daily Sliding scale:  140-175 1 unit  176-210 2 units  211-245 3 units  246-280 4 units  281-315 5 units  316-350 6 units  351-385 7 units  386-420 8 units  >420 9 units 3/22/2018 at am Yes Unknown, Entered By History   METHOCARBAMOL PO Take 750 mg by mouth 3 times daily as needed for muscle spasms prn Yes Unknown, Entered By History   GLIPIZIDE PO Take 10 mg by mouth every morning (before breakfast) 3/22/2018 at am Yes Unknown, Entered By History   LORazepam (ATIVAN) 0.5 MG tablet Take 1 tablet (0.5 mg) by mouth every 4 hours as needed (Anxiety, Nausea/Vomiting or Sleep) prn Yes Gretel Quinn MD   prochlorperazine (COMPAZINE) 10 MG tablet Take 1 tablet (10 mg) by mouth every 6 hours as needed (Nausea/Vomiting) prn Yes Gretel Quinn MD   acyclovir (ZOVIRAX) 400 MG tablet Take 1 tablet (400 mg) by mouth 2 times daily Start 1 week prior to daratumumab and continue for 3 months after treatment  is complete. Past Month at Unknown time Yes Gretel Quinn MD   omeprazole (PRILOSEC) 20 MG CR capsule TAKE 1 CAPSULE BY MOUTH EVERY DAY 3/22/2018 at Unknown time Yes Dickson Reich MD   levothyroxine (SYNTHROID/LEVOTHROID) 25 MCG tablet TAKE 1 TABLET BY MOUTH EVERY DAY 3/22/2018 at Unknown time Yes Dickson Reich MD   INSULIN ASPART SC Inject 13 Units Subcutaneous every morning  3/22/2018 at am Yes Reported, Patient   INSULIN ASPART SC Inject 11 Units Subcutaneous daily (before lunch)  3/21/2018 Yes Reported, Patient   INSULIN ASPART SC Inject Subcutaneous At Bedtime Inject 4 unit subcutaneously at bedtime for Diabetes II Ok to use Humalog insulin with same order details 3/21/2018 Yes Reported, Patient   VITAMIN D, CHOLECALCIFEROL, PO Take 1,000 Units by mouth 3/22/2018 at Unknown time Yes Reported, Patient   metoprolol tartrate (LOPRESSOR) 25 MG tablet TAKE 1 TABLET BY MOUTH TWICE DAILY 3/22/2018 at Unknown time Yes Dickson Reich MD   polyethylene glycol (MIRALAX/GLYCOLAX) Packet Take 17 g by mouth 2 times daily as needed (constipation) prn Yes Kellee Melo MD   insulin detemir (LEVEMIR FLEXPEN/FLEXTOUCH) 100 UNIT/ML injection Inject 16 Units Subcutaneous every morning (before breakfast) 3/22/2018 at Unknown time Yes Kellee Melo MD   LISINOPRIL PO Take 15 mg by mouth daily 3/22/2018 at Unknown time Yes Reported, Patient   amLODIPine (NORVASC) 5 MG tablet TAKE 1 TABLET BY MOUTH DAILY 3/22/2018 at am Yes Dickson Reich MD   furosemide (LASIX) 20 MG tablet Take 1 tablet (20 mg) by mouth daily 3/22/2018 at am Yes Dickson Reich MD   gemfibrozil (LOPID) 600 MG tablet TAKE 1 TABLET BY MOUTH TWICE DAILY 3/22/2018 at Unknown time Yes Dickson Reich MD   Ascorbic Acid (VITAMIN C PO) Take 500 mg by mouth daily  3/22/2018 at am Yes Unknown, Entered By History   ferrous sulfate (IRON) 325 (65 FE) MG tablet Take 325 mg by mouth daily (with breakfast) 3/22/2018 at am  Yes Unknown, Entered By History   Selenium 200 MCG TABS Take 100 mcg by mouth daily. Pt takes one half tab of the 200 mcg daily  3/22/2018 at Unknown time Yes Reported, Patient   FREESTYLE LITE test strip USE TO TEST FOUR TIMES DAILY   Dickson Reich MD   Blood Glucose Monitoring Suppl MISC 3 times daily (before meals)    Reported, Patient   B-D U/F 31G X 8 MM insulin pen needle USE 5 PEN NEEDLES PER DAY OR AS DIRECTED   Dickson Reich MD   FREESTYLE LITE test strip USE 1 STRIP TO TEST TWICE DAILY.   Dickson Reich MD   B-D U/F 31G X 8 MM insulin pen needle    Reported, Patient

## 2018-03-23 NOTE — PROVIDER NOTIFICATION
MD Notification    Notified Person: MD    Notified Person Name:Dr. Mcelroy     Notification Date/Time:3/23, 5:16    Notification Interaction:    Purpose of Notification: recheck BS of 437    Orders Received: 10 UNITS lantus ordered by MD    Comments:will monitor.

## 2018-03-23 NOTE — PROGRESS NOTES
[facility] [place]    Hospitalist Progress Note  Micky Moya MD    Assessment & Plan     89-year-old gentleman, with PmHx of multiple myeloma initially diagnosed in 11/2016, MDS, prostate carcinoma, bladder carcinoma, paroxysmal atrial fibrillation, Hypertension, Hyperlipidemia, pancytopenia due to multiple myeloma and chemotherapy, who was admitted on 3/22/18 from the Oncology Clinic, due to symptoms of cough, shortness of breath, palpitations and tremors, while receiving cycle 1 day 1 of daratumumab/dexamethasone  . He was found to be in AFIB rate 140s-150s.  EKG on 3/22/18 showed, atrial fibrillation with ventricular rate of 145. Work up done on 3/22/18 revealed, CMP significant for Alb 2.7. CBC revealed, Hb 8.6, WBC 5.6 and Plts 91. Troponin was <0.015, lactic acid 2.2.        1.   Paroxysmal Atrial fibrillation with rapid ventricular response: now in NSR. S/P IV diltiazem 10mg x 2 doses. S/P IV diltiazem drip. HR is running in the 70s/80s. Switched metoprolol 25mg po bid to toprol XL 50mg qd.. The pt is not on anticoagulation due to lower gastrointestinal bleed.  ECHO on 01/11/2018 showed, LVEF 65%, no regional wall motion abnormalities, RV was normal, no hemodynamically significant valvular abnormalities. TSH on 3/23/18 was normal.       2.   Multiple myeloma: pt was commenced on daratumumab/dexamethasone on 3/22/18,  due to progression of his myeloma. Appreciate Oncology input.       3.   Hypertension: Continue amlodipine and toprol XL.    4.   Type 2, Diabetes mellitus: BG is running in the 300s and 400s today. This is due to the pre-medication of IV Dexamethasone received on 3/22/18 at the Oncology Clinic and current oral dexamethasone. HbA1C was 5.5% on 3/22/18. Increased Levemir from 16u to 20u today. For high dose insulin aspart sliding scale prn.      5.   Chemotherapy induced Pancytopenia: CBC on 3/22/18 revealed, Hb 8.6, WBC 5.6 and Plts 91 on 3/22/18. For Hb today - 3/23/18.        DVT Prophylaxis: Ambulate every shift  Code Status: DNR/DNI    Disposition: Expected discharge in 1 day.      Interval History   The pt reported having shortness of breath on exertion. No complaints of chest pain or palpitations. He has converted to NSR with IV diltiazem, with HR in the 70s/80s.    -Data reviewed today: I reviewed all new labs and imaging results over the last 24 hours. I personally reviewed no images or EKG's today.    Physical Exam   Temp: 97.2  F (36.2  C) Temp src: Oral BP: 141/84 Pulse: 99 Heart Rate: 83 Resp: 18 SpO2: 96 % O2 Device: Nasal cannula Oxygen Delivery: 1 LPM  Vitals:    03/22/18 1325 03/22/18 1656 03/23/18 0400   Weight: 95.3 kg (210 lb) 94.2 kg (207 lb 10.8 oz) 95.4 kg (210 lb 6.4 oz)     Vital Signs with Ranges  Temp:  [97.2  F (36.2  C)-100  F (37.8  C)] 97.2  F (36.2  C)  Pulse:  [] 99  Heart Rate:  [] 83  Resp:  [16-25] 18  BP: (108-164)/() 141/84  SpO2:  [91 %-97 %] 96 %  I/O last 3 completed shifts:  In: 1240 [P.O.:240; IV Piggyback:1000]  Out: 650 [Urine:650]    Constitutional: Elderly white male, awake, cooperative, no apparent distress, O2 Sats 91% on RA  Respiratory: BS vesicular bilaterally, no crackles or wheezing  Cardiovascular: S1 and S2, reg, no murmur noted  GI: Soft, non-distended, non-tender, no masses, BS present+  Skin/Integumen: No rashes  Extremities: Trace bilateral pedal edema    Medications     diltiazem (CARDIZEM) infusion ADULT Stopped (03/22/18 0716)     - MEDICATION INSTRUCTIONS -         sodium chloride (PF)  3 mL Intracatheter Q8H     acyclovir  400 mg Oral BID     amLODIPine  5 mg Oral Daily     ascorbic acid (VITAMIN C) tablet 1,000 mg  1,000 mg Oral Daily     dexamethasone  4 mg Oral Daily with breakfast     ferrous sulfate  325 mg Oral Daily with breakfast     gemfibrozil  600 mg Oral BID     insulin detemir  16 Units Subcutaneous QAM AC     levothyroxine  25 mcg Oral Daily     metoprolol tartrate  25 mg Oral BID      omeprazole  20 mg Oral QAM     cholecalciferol  1,000 Units Oral Daily     insulin aspart  1-7 Units Subcutaneous TID AC     insulin aspart  1-5 Units Subcutaneous At Bedtime       Data     Recent Labs  Lab 03/23/18  0532 03/22/18  1335 03/22/18  0840   WBC  --  5.6 3.6*   HGB  --  8.6* 7.7*   MCV  --  115* 114*   PLT  --  91* 178    140  --    POTASSIUM 4.2 3.5  --    CHLORIDE 106 108  --    CO2 20 23  --    BUN 36* 24  --    CR 1.06 0.95  --    ANIONGAP 9 9  --    MICHELLE 8.0* 8.5  --    * 168*  --    ALBUMIN  --  2.7*  --    PROTTOTAL  --  7.8  --    BILITOTAL  --  0.3  --    ALKPHOS  --  86  --    ALT  --  15  --    AST  --  16  --    TROPI  --  <0.015  --        No results found for this or any previous visit (from the past 24 hour(s)).

## 2018-03-23 NOTE — PLAN OF CARE
Problem: Patient Care Overview  Goal: Plan of Care/Patient Progress Review  Outcome: Improving  Very sob with sl. Exertion. O2 placed back on at 1L NC. Tires easily. Seen by hospitalist and Hemac.Hgb 7.7-doc aware

## 2018-03-23 NOTE — PLAN OF CARE
Problem: Patient Care Overview  Goal: Plan of Care/Patient Progress Review  Outcome: No Change  Pt A&O. Pt denied pain or SOB. Tele NSR. Systolic BP in 150s. On IVF. Up w/ A-1 to the bathroom. BG elevated to 400s, check MD notification notes for details. Pt has Hem Onc consult ordered. Will continue to monitor pt.

## 2018-03-23 NOTE — PROVIDER NOTIFICATION
MD Notification    Notified Person: MD    Notified Person Name:  Navi    Notification Date/Time: 0252, 3/23    Notification Interaction: talked to MD on phone     Purpose of Notification: Pt's , pt did not receive bedtime insulin     Orders Received: MD ordered to give 5 units per the bedtime sliding scale order and additional 2 units of Novolog     Comments: will recheck BS.

## 2018-03-23 NOTE — PLAN OF CARE
Problem: Patient Care Overview  Goal: Plan of Care/Patient Progress Review  [id number]: VSS on RA and Dilt gtt. Tele is Afib CVR with HR in 80s and converted to SR at 2000, dilt gtt stopped. Denies Pain and SOB.  IV positional but patent. Individually positioning in bed.

## 2018-03-23 NOTE — CONSULTS
[facility]    Hematology/Oncology Consult Note      Date of Admission:  3/22/2018  Date of Consult:  03/23/18  Reason for Consult: multiple myeloma      ASSESSMENT/PLAN:  Roc Parkinson is a 89 year old male with:    1) Atrial fibrillation: s/p IV diltiazem and diltiazem drip.  He has converted to sinus rhythm.  He is being switched to metoprolol today.  He is not on anticoagluation due to history of lower GI bleed.    2) Multiple myeloma: he started on daratumumab and dexamethasone on 3/22/18.    -holding treatment for now due to atrial fibrillation  -he will follow-up with Dr. Quinn after discharge to discuss treatment plan; he will be rounding tomorrow    3) DMII: Glucoses have been running higher due to high doses of steroids he has received.  -on insulin, as per hospitalist service    4) Anemia and thrombocytopenia: Hemoglobin of 8.6 and platelet count of 91K.  He has history of MDS and gets Aranesp 300 mcg SQ every 3 weeks.  -monitor CBC  -transfuse for hemoglobin <8        HISTORY OF PRESENT ILLNESS: Roc Parkinson is a 89 year old male who presents with atrial fibrillation.  He is a patient of Dr. Quinn.  He has history of kappa light chain IgM multiple myeloma, as well as background of myelodysplastic syndrome.  He also has history of prostate cancer s/p EBRT s/p brachytherapy in 2003, and superficial bladder cancer, no recurrence since 1986.  He has had treatment history of Velcade/dex, cyclophosphamide added later.  He was switched to carfilzomib and dexamethasone on 1/2/18.  He then had clear progression of the myeloma in March 2018.  Plans were made to start daratumumab and dexamethasone.  He received the first dose yesterday.  During the infusion of daratumumab yesterday, he developed shortness of breath, cough, and heart palpitations.  He was pre-medicated prior to the chemo with Tylenol, Benadryl, and dexamethasone.  He was then given prn medications of Solu-medrol, epi,  and Demerol.  EKG showed atrial fibrillation with heart rate in 140-150's, sat's 97-98% on 2L.  911 was called, and patient was transported to the ED.  He has history of atrial fibrillation in the past.  He received IV diltiazem and placed on diltiazem drip.  He has converted to sinus rhythm.  He is being switched to metoprolol today.     Today, Roc says that he is feeling better.  He still gets short of breath when he goes to the bathroom, but he no longer feels palpitations.  His glucose has been running high due to steroids.      REVIEW OF SYSTEMS:   14 point ROS was reviewed and is negative other than as noted above in HPI.       MEDICATIONS:  Current Facility-Administered Medications   Medication     sodium chloride (PF) 0.9% PF flush 3 mL     sodium chloride (PF) 0.9% PF flush 3 mL     [START ON 3/24/2018] insulin detemir (LEVEMIR) injection 20 Units     insulin aspart (NovoLOG) inj (RAPID ACTING)     insulin aspart (NovoLOG) inj (RAPID ACTING)     [START ON 3/24/2018] metoprolol succinate (TOPROL-XL) 24 hr tablet 50 mg     metoprolol succinate (TOPROL-XL) 24 hr tablet 25 mg     diltiazem (CARDIZEM) 125 mg in sodium chloride 0.9 % 125 mL infusion     acyclovir (ZOVIRAX) tablet 400 mg     amLODIPine (NORVASC) tablet 5 mg     ascorbic acid (VITAMIN C) tablet 1,000 mg     dexamethasone (DECADRON) tablet 4 mg     ferrous sulfate (IRON) tablet 325 mg     gemfibrozil (LOPID) tablet 600 mg     levothyroxine (SYNTHROID/LEVOTHROID) tablet 25 mcg     LORazepam (ATIVAN) tablet 0.5 mg     omeprazole (priLOSEC) CR capsule 20 mg     polyethylene glycol (MIRALAX/GLYCOLAX) Packet 17 g     prochlorperazine (COMPAZINE) tablet 5 mg     cholecalciferol (vitamin D3) tablet 1,000 Units     naloxone (NARCAN) injection 0.1-0.4 mg     melatonin tablet 1 mg     Patient is already receiving anticoagulation with heparin, enoxaparin (LOVENOX), warfarin (COUMADIN)  or other anticoagulant medication     potassium chloride SA  (K-DUR/KLOR-CON M) CR tablet 20-40 mEq     potassium chloride (KLOR-CON) Packet 20-40 mEq     potassium chloride 10 mEq in 100 mL sterile water intermittent infusion (premix)     potassium chloride 10 mEq in 100 mL intermittent infusion with 10 mg lidocaine     potassium chloride 20 mEq in 50 mL intermittent infusion     magnesium sulfate 4 g in 100 mL sterile water (premade)     glucose 40 % gel 15-30 g    Or     dextrose 50 % injection 25-50 mL    Or     glucagon injection 1 mg         ALLERGIES:  No Known Allergies      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         PAST SURGICAL HISTORY:  Past Surgical History:   Procedure Laterality Date     ARTHROPLASTY KNEE  11/5/2012    Procedure: ARTHROPLASTY KNEE;  RIGHT TOTAL KNEE ARTHROPLASTY (SMITH & NEPHEW)^;  Surgeon: Dickson Schulte MD;  Location:  OR     BIOPSY      bladder     COLONOSCOPY  2007     COLONOSCOPY N/A 11/15/2016    Procedure: COMBINED COLONOSCOPY, SINGLE OR MULTIPLE BIOPSY/POLYPECTOMY BY BIOPSY;  Surgeon: Kenneth Fulton MD;  Location:  GI     GENITOURINARY SURGERY      TURP x2 and bladder scraping     GI SURGERY      anal fistula     ORTHOPEDIC SURGERY      lt knee in nov.2009     PHACOEMULSIFICATION CLEAR CORNEA WITH STANDARD INTRAOCULAR LENS IMPLANT Left 10/25/2017    Procedure: PHACOEMULSIFICATION CLEAR CORNEA WITH STANDARD INTRAOCULAR LENS IMPLANT;  LEFT EYE PHACOEMULSIFICATION CLEAR CORNEA WITH STANDARD INTRAOCULAR LENS IMPLANT ;  Surgeon: Shady Haider MD;  Location:  EC     PHACOEMULSIFICATION CLEAR CORNEA WITH STANDARD INTRAOCULAR LENS IMPLANT Right 11/1/2017    Procedure: PHACOEMULSIFICATION CLEAR CORNEA WITH STANDARD INTRAOCULAR LENS IMPLANT;  RIGHT EYE  "PHACOEMULSIFICATION CLEAR CORNEA WITH STANDARD INTRAOCULAR LENS IMPLANT;  Surgeon: Shady Haider MD;  Location: [facility]     SOFT TISSUE SURGERY      melanoma         SOCIAL HISTORY:  Social History     Social History     Marital status:      Spouse name: N/A     Number of children: N/A     Years of education: N/A     Occupational History     Not on file.     Social History Main Topics     Smoking status: Never Smoker     Smokeless tobacco: Never Used     Alcohol use No     Drug use: No     Sexual activity: No     Other Topics Concern     Parent/Sibling W/ Cabg, Mi Or Angioplasty Before 65f 55m? No     Social History Narrative         FAMILY HISTORY:  Family History   Problem Relation Age of Onset     Cardiovascular Father 81     heart arrythmia     Endocrine Disease Brother 74     Paget's         PHYSICAL EXAM:  Vital signs:  Temp: 95.7  F (35.4  C) Temp src: Oral BP: 134/82 Pulse: 99 Heart Rate: 76 Resp: 18 SpO2: 91 % O2 Device: None (Room air) Oxygen Delivery: 1 LPM Height: 177.8 cm (5' 10\") Weight: 95.4 kg (210 lb 6.4 oz)  Estimated body mass index is 30.19 kg/(m^2) as calculated from the following:    Height as of this encounter: 1.778 m (5' 10\").    Weight as of this encounter: 95.4 kg (210 lb 6.4 oz).    GENERAL/CONSTITUTIONAL: No acute distress. Son at bedside.  EYES: No scleral icterus.  RESPIRATORY: Clear to auscultation bilaterally. No crackles or wheezing.   CARDIOVASCULAR: Regular rate and rhythm without murmurs, gallops, or rubs.  GASTROINTESTINAL: No tenderness. The patient has normal bowel sounds. No guarding.  MUSCULOSKELETAL: Warm and well-perfused.  NEUROLOGIC: Alert, oriented, answers questions appropriately.  INTEGUMENTARY: No jaundice.      LABS:  CBC RESULTS:   Recent Labs   Lab Test  03/22/18   1335   WBC  5.6   RBC  2.41*   HGB  8.6*   HCT  27.6*   MCV  115*   MCH  35.7*   MCHC  31.2*   RDW  19.6*   PLT  91*       Recent Labs   Lab Test  03/23/18   0532  03/22/18   1335   NA  135  " 140   POTASSIUM  4.2  3.5   CHLORIDE  106  108   CO2  20  23   ANIONGAP  9  9   GLC  446*  168*   BUN  36*  24   CR  1.06  0.95   MICHELLE  8.0*  8.5             Thank you for the opportunity to participate in this patient's care.  Please call with any questions.    Rosalva Heath MD  Hematology/Oncology  [facility]

## 2018-03-23 NOTE — PLAN OF CARE
Problem: Patient Care Overview  Goal: Plan of Care/Patient Progress Review  Outcome: No Change  Vss, AOx4. Contact precautions for ESBL. Generalized weakness reported. Denies dizziness. HR controlled with diltiazem Gtt, dosed at 10/hr with sustained stable blood pressure. 2L O2. Up with assist of 1, voiding well in urinal.

## 2018-03-24 NOTE — PROGRESS NOTES
"[facility]  HEMATOLOGY ONCOLOGY    DIAGNOSIS:    1- Kappa light chain IgM multiple myeloma in a 89-year-old patient.  Bone marrow biopsy on 11/17/2016 showed hypercellular bone marrow with 65% cellularity of light chain restricted plasma cells.  FISH or G-banding could not be performed due to insufficient sample.   There is a background of myelodysplastic syndrome.  The patient was initially seen in the hospital on 11/17/2016 for macrocytic anemia and pancytopenia and transfusion requirement and a bone marrow biopsy was performed.   2- History of prostate cancer s/p EBRT s/p brachytherapy in 2003 (recent PSA 0.10), superficial bladder cancer ,no recurrences since 1986      TREATMENT: 12/15/16 velcade/dex. 4/12/17 added cyclophosphamide 300 mg weekly.6/28/17 we discontinued velcade due to concern for neuropathy and continued with weekly cyclophosphamide and dexamethasone.   1/2/18 switched to Carfilzomib and dexamethasone.   3/22/18 started Daratumumab/Vekcade and Dex       SUBJECTIVE:  The patient was seen as a followup today. He is admitted with Afib.      REVIEW OF SYSTEMS:  A complete review of systems was performed and found to be negative other than pertinent positives mentioned in history of present illness.      Past medical, social histories reviewed.     Meds- Reviewed.    PHYSICAL EXAMINATION:   /78 (BP Location: Right arm)  Pulse 69  Temp 96.3  F (35.7  C) (Oral)  Resp 16  Ht 1.778 m (5' 10\")  Wt 95.1 kg (209 lb 9.6 oz)  SpO2 90%  BMI 30.07 kg/m2   CONSTITUTIONAL: Laying on bed. Appears tired.   NEUROLOGIC: Alert, awake.   LYMPHATICS: No edema.   PSYCH: Mood and affect was normal.    LABORATORY DATA AND IMAGING REVIEWED DURING THIS VISIT:  Recent Labs   Lab Test  03/23/18   0532  03/22/18   1335   01/22/18   0837  01/21/18   0834  01/20/18   0220   NA  135  140   < >  141  140  139   POTASSIUM  4.2  3.5   < >  4.1  4.1  4.1   CHLORIDE  106  108   < >  109  108  107 "   CO2  20  23   < >  23  22  22   ANIONGAP  9  9   < >  9  10  10   BUN  36*  24   < >  25  29  45*   CR  1.06  0.95   < >  1.01  1.02  1.45*   GLC  446*  168*   < >  110*  183*  75   MICHELLE  8.0*  8.5   < >  8.9  8.8  9.1   MAG  1.8   --    --   2.3   --   2.2   PHOS   --    --    --    --   2.9  2.2*    < > = values in this interval not displayed.     Recent Labs   Lab Test  03/24/18   0600  03/23/18   1259  03/22/18   1335  03/22/18   0840  03/14/18   1247   WBC  4.6   --   5.6  3.6*  4.1   HGB  7.6*  7.7*  8.6*  7.7*  8.0*   PLT  72*   --   91*  178  72*   MCV  113*   --   115*  114*  111*   NEUTROPHIL   --    --   93.5  75.3  86.1     Recent Labs   Lab Test  03/22/18   1335  03/07/18   1130  02/07/18   1100   11/17/16   0938   BILITOTAL  0.3  0.5  0.4   < >   --    ALKPHOS  86  99  99   < >   --    ALT  15  14  14   < >   --    AST  16  15  14   < >   --    ALBUMIN  2.7*  3.1*  2.7*   < >   --    LDH   --    --    --    --   110    < > = values in this interval not displayed.     ASSESSMENT AND RECOMMENDATIONS:  This is a 89-year-old gentleman who has kappa light chain multiple myeloma with hypercellular marrow with 65% of cells are light chain restricted plasma cells.  He had severe pancytopenia and has needed a transfusion in the hospital.  He did not have hypercalcemia and his renal function was normal. He has a background of myelodysplastic syndrome on his bone marrow biopsy; however, majority of the cell population was monoclonal plasma cell population.   He was started on treatment due to cytopenia.   In 4/2017 his light chain levels were getting worse. M spike was stable. We added cyclophosphamide to the regimen. In 6/2017 discontinued velcade for concern of development of neuropathy, in hindsight the pain appeared to be related to arthritis.      He is on Aranesp 300 mcg SQ every three weeks.  - He was give a dose of daratumumab/Dex/Velcade on 3/22 and ended up in the hospital with Afib.   - He can  discharged home and will need follow up with me next week.      ALISON JON MD    3/24/2018

## 2018-03-24 NOTE — PROGRESS NOTES
Hospitalist X-cover    Called about hgb of 7.7.  He has been running about this since 3/14/18.  (7.7<8.6<7.7<8.0)  Will check CBC in am and also order type and screen.  Heme Onc note from today notes they would like transfusion to keep hgb > 8.o, but does not have a type and screen and given the chronicity feel that this can be done in the morning.    JESUS Mcelroy MD

## 2018-03-24 NOTE — PLAN OF CARE
Problem: Patient Care Overview  Goal: Plan of Care/Patient Progress Review  Outcome: Improving  Patient A&O x4. VSS on RA. Tele NSR. Denies pain. Up with A1 to bathroom. Hem/onc following. Contact precautions maintained. Will continue to monitor.

## 2018-03-24 NOTE — PROGRESS NOTES
[facility]    Hospitalist Progress Note  Micky Moya MD    Assessment & Plan     89-year-old gentleman, with PmHx of multiple myeloma initially diagnosed in 11/2016, MDS, prostate carcinoma, bladder carcinoma, paroxysmal atrial fibrillation, Hypertension, Hyperlipidemia, pancytopenia due to multiple myeloma and chemotherapy, who was admitted on 3/22/18 from the Oncology Clinic, due to symptoms of cough, shortness of breath, palpitations and tremors, while receiving cycle 1 day 1 of daratumumab/dexamethasone  . He was found to be in AFIB rate 140s-150s.  EKG on 3/22/18 showed, atrial fibrillation with ventricular rate of 145. Work up done on 3/22/18 revealed, CMP significant for Alb 2.7. CBC revealed, Hb 8.6, WBC 5.6 and Plts 91. Troponin was <0.015, lactic acid 2.2.        1.   Paroxysmal Atrial fibrillation with rapid ventricular response: now in NSR, with HR in the 60s and 70s. S/P IV diltiazem 10mg x 2 doses. S/P IV diltiazem drip.  Continue toprol XL 50mg qd.. The pt is not on anticoagulation due to history of lower gastrointestinal bleed.  ECHO on 01/11/2018 showed, LVEF 65%, no regional wall motion abnormalities, RV was normal, no hemodynamically significant valvular abnormalities. TSH on 3/23/18 was normal.       2.   Multiple myeloma: pt was commenced on daratumumab/dexamethasone on 3/22/18,  due to progression of his myeloma. Appreciate Oncology input. For outpt follow up.      3.   Hypertension: Continue amlodipine and toprol XL.    4.   Type 2, Diabetes mellitus: BG is ranging 142-->221 today. Pt is on oral dexamethasone qd. HbA1C was 5.5% on 3/22/18. Continue Levemir 20u. For high dose insulin aspart sliding scale prn.      5.   Chemotherapy induced Pancytopenia: CBC revealed, Hb 8.6-->7.6, WBC 5.6-->4.6 and Plts 91-->72 on 3/24/18. For transfusion of 1u of PRBCs today.      DVT Prophylaxis: Ambulate every shift  Code Status: DNR/DNI    Disposition: Expected discharge in 1  day.      Interval History   The pt has lethargy. He is in NSR rate in the 60s/70s.     -Data reviewed today: I reviewed all new labs and imaging results over the last 24 hours. I personally reviewed no images or EKG's today.    Physical Exam   Temp: 98.6  F (37  C) Temp src: Oral BP: 143/71   Heart Rate: 72 Resp: 16 SpO2: 93 % O2 Device: None (Room air) Oxygen Delivery: 1 LPM  Vitals:    03/22/18 1656 03/23/18 0400 03/24/18 0640   Weight: 94.2 kg (207 lb 10.8 oz) 95.4 kg (210 lb 6.4 oz) 95.1 kg (209 lb 9.6 oz)     Vital Signs with Ranges  Temp:  [95.4  F (35.2  C)-98.6  F (37  C)] 98.6  F (37  C)  Heart Rate:  [68-84] 72  Resp:  [16-20] 16  BP: (116-160)/(60-85) 143/71  SpO2:  [91 %-97 %] 93 %  I/O last 3 completed shifts:  In: 490 [P.O.:490]  Out: -     Constitutional: Elderly white male, awake, cooperative, no apparent distress, O2 Sats 93% on RA  Respiratory: BS vesicular bilaterally, no crackles or wheezing  Cardiovascular: S1 and S2, reg, no murmur noted  GI: Soft, non-distended, non-tender, no masses, BS present+  Skin/Integumen: No rashes  Extremities: Trace bilateral pedal edema    Medications     diltiazem (CARDIZEM) infusion ADULT Stopped (03/22/18 2470)     - MEDICATION INSTRUCTIONS -         sodium chloride (PF)  3 mL Intracatheter Q8H     insulin detemir  20 Units Subcutaneous QAM AC     insulin aspart  1-10 Units Subcutaneous TID AC     insulin aspart  1-7 Units Subcutaneous At Bedtime     metoprolol succinate  50 mg Oral Daily     acyclovir  400 mg Oral BID     amLODIPine  5 mg Oral Daily     ascorbic acid (VITAMIN C) tablet 1,000 mg  1,000 mg Oral Daily     dexamethasone  4 mg Oral Daily with breakfast     ferrous sulfate  325 mg Oral Daily with breakfast     gemfibrozil  600 mg Oral BID     levothyroxine  25 mcg Oral Daily     omeprazole  20 mg Oral QAM     cholecalciferol  1,000 Units Oral Daily       Data     Recent Labs  Lab 03/24/18  0600 03/23/18  1259 03/23/18  0532 03/22/18  1334  03/22/18  0840   WBC 4.6  --   --  5.6 3.6*   HGB 7.6* 7.7*  --  8.6* 7.7*   *  --   --  115* 114*   PLT 72*  --   --  91* 178   NA  --   --  135 140  --    POTASSIUM  --   --  4.2 3.5  --    CHLORIDE  --   --  106 108  --    CO2  --   --  20 23  --    BUN  --   --  36* 24  --    CR  --   --  1.06 0.95  --    ANIONGAP  --   --  9 9  --    MICHELLE  --   --  8.0* 8.5  --    GLC  --   --  446* 168*  --    ALBUMIN  --   --   --  2.7*  --    PROTTOTAL  --   --   --  7.8  --    BILITOTAL  --   --   --  0.3  --    ALKPHOS  --   --   --  86  --    ALT  --   --   --  15  --    AST  --   --   --  16  --    TROPI  --   --   --  <0.015  --        No results found for this or any previous visit (from the past 24 hour(s)).

## 2018-03-24 NOTE — PROVIDER NOTIFICATION
MD Notification    Notified Person: MD Hospitalist    Notified Person Name: Dr. Mcelroy     Notification Date/Time: 3/23/18 1240    Notification Interaction: Telephone     Purpose of Notification: Hgb level    Orders Received: Recheck CBC and Type & Screen in am     Comments: Page placed to Hem/Onc initially

## 2018-03-24 NOTE — PLAN OF CARE
Problem: Patient Care Overview  Goal: Plan of Care/Patient Progress Review  Outcome: Improving  VSS. Monitor remains sinus rhythm with 1st degree AVB, BBB. Pt. Denies pain. Pt. Remains SYED. Hemoglobin 7.6. Receiving one unit blood transfusion. Will recheck hemoglobin one hour after transfusion completed. Plan for discharge tomorrow. Continue to monitor and observe closely.

## 2018-03-25 NOTE — PROVIDER NOTIFICATION
Dr. Moya notified via web-page regarding pt's CXR result. Awaiting for response.     Addendum/1248: orders received.

## 2018-03-25 NOTE — PLAN OF CARE
Problem: Patient Care Overview  Goal: Plan of Care/Patient Progress Review  Outcome: No Change  A&Ox4. VSS on 3L NC. Up with assist of 1 and a walker. Tele SR with PACs.  at 0200. Plan for Hg lab in the AM, and possible discharge. Will continue to monitor.

## 2018-03-25 NOTE — PROGRESS NOTES
03/25/18 1400   General Information   Onset Date 03/25/18   Start of Care Date 03/25/18   Referring Physician Dr. Moya   Patient Profile Review/OT: Additional Occupational Profile Info See Profile for full history and prior level of function   Patient/Family Goals Statement tolerate oral intake without coughing   Swallowing Evaluation Bedside swallow evaluation   Clinical Swallow Evaluation   Oral Musculature generally intact   Dentition other (see comments)  (adequate, missing a few R bottom)   Mandibular Strength and Mobility intact   Oral Labial Strength and Mobility WFL   Lingual Strength and Mobility WFL   Buccal Strength and Mobility intact   Laryngeal Function Throat clear;Swallow;Voicing initiated   Clinical Swallow Eval: Thin Liquid Texture Trial   Mode of Presentation, Thin Liquids cup;self-fed   Volume of Liquid or Food Presented singles sips, mutliple sips   Oral Phase of Swallow WFL   Pharyngeal Phase of Swallow intact   Diagnostic Statement No overt s/s aspiration on thin liquids   Clinical Swallow Eval: Puree Solid Texture Trial   Mode of Presentation, Puree spoon;fed by clinician   Volume of Puree Presented 1/2t x 5   Oral Phase, Puree WFL   Pharyngeal Phase, Puree intact   Diagnostic Statement No overt s/s aspiration on pureed textures   Clinical Swallow Eval: Solid Food Texture Trial   Mode of Presentation, Solid self-fed   Volume of Solid Food Presented 1 reji cracker   Oral Phase, Solid WFL   Pharyngeal Phase, Solid intact   Diagnostic Statement No overt s/s aspiration on solids   Swallow Compensations   Swallow Compensations Pacing;Reduce amounts   Results No difficulties noted   General Therapy Interventions   Planned Therapy Interventions Dysphagia Treatment   Dysphagia treatment Modified diet education;Instruction of safe swallow strategies   Intervention Comments strategy use for SOB and safe swallowing, tolerance DD3   Swallow Eval: Clinical Impressions   Skilled Criteria for Therapy  Intervention Skilled criteria met.  Treatment indicated.   Functional Assessment Scale (FAS) 5   Treatment Diagnosis mild oropharyngeal dysphagia   Diet texture recommendations Dysphagia diet level 3;Thin liquids   Recommended Feeding/Eating Techniques alternate between small bites and sips of food/liquid;maintain upright posture during/after eating for 30 mins;no straws;small sips/bites   Therapy Frequency other (see comments)  (1-2 sessions for diet tolerance/strategy use)   Predicted Duration of Therapy Intervention (days/wks) (5 days)   Anticipated Discharge Disposition home   Risks and Benefits of Treatment have been explained. Yes   Patient, family and/or staff in agreement with Plan of Care Yes   Clinical Impression Comments Nursing and pt report coughing while taking pills.  Bedside evaluation completed.  Pt's oral [facility] evaluation was wnl.  He became very ill during chemo session and was admitted to hospital.  Pt is very SOB.  Positioned upright in bed.  Independent with feeding.  No overt s/s aspiration with thin liquids, pudding or cracker.  Oral transit times and oral prep times wfl all textures.  Laryngeal elevation all swallows with clear voicing.  Instructed pt on strategies for dealing with SOB including small bites, single sips, no straws, change of diet to DD3 from regular.  Pt reports that he does cut up his food quite a bit at home and was interested in trying DD3 diet/thin liquids.  Recommend:  DD3/thin liquids, in chair all meals, small bites/sips, alternate solids and liquids, slow rate of intake, no straws and medication crushed in applesauce.  SLP to follow 1-2 days for diet tolerance. Pt may need to continue DD3 if pursues chemotherapy.   Total Evaluation Time   Total Evaluation Time (Minutes) 15

## 2018-03-25 NOTE — PROGRESS NOTES
[facility]    Hospitalist Progress Note  Micky Moya MD    Assessment & Plan     89-year-old gentleman, with PmHx of multiple myeloma initially diagnosed in 11/2016, MDS, prostate carcinoma, bladder carcinoma, paroxysmal atrial fibrillation, Hypertension, Hyperlipidemia, pancytopenia due to multiple myeloma and chemotherapy, who was admitted on 3/22/18 from the Oncology Clinic, due to symptoms of cough, shortness of breath, palpitations and tremors, while receiving cycle 1 day 1 of daratumumab/dexamethasone  . He was found to be in AFIB rate 140s-150s.  EKG on 3/22/18 showed, atrial fibrillation with ventricular rate of 145. Work up done on 3/22/18 revealed, CMP significant for Alb 2.7. CBC revealed, Hb 8.6, WBC 5.6 and Plts 91. Troponin was <0.015, lactic acid 2.2.        1.   S/P Paroxysmal Atrial fibrillation with rapid ventricular response: now in NSR, with HR in the 60s and 70s. S/P IV diltiazem 10mg x 2 doses and IV diltiazem drip. Continue toprol XL 50mg qd.The pt is not on anticoagulation due to history of lower gastrointestinal bleed.  ECHO on 01/11/2018 showed, LVEF 65%, no regional wall motion abnormalities, RV was normal, no hemodynamically significant valvular abnormalities. TSH on 3/23/18 was normal.      2.  Possible healthcare associated bibasal pneumonia R>L, with right pleural effusion and hypoxic respiratory failure: the pt is immunocompromised with underlying active myeloma and recent chemotherapy. For IV Zosyn, IV levaquin and IV Vancoymcin. For Xopenex nebs prn.  Continue 3L O2 via NC. For Robitussin DM prn. WBC was 4.7 on 3/25/18. For speech therapist review, to rule out aspiration. For D-Dimer and BNP check today, per pt's Sister-in-law - who is a Physician.     3.   Multiple myeloma:  Appreciate Oncology input. pt was commenced on daratumumab/dexamethasone on 3/22/18,  due to progression of his myeloma. For outpt Oncology follow up.      4.   Hypertension: SBP  is ranging 140s-->160s. Will increase amlodipine from 5mg qd to 10mg qd.  Continue toprol 50mg XL.    5.   Type 2, Diabetes mellitus: BG is ranging 102-211 today. Pt is on oral dexamethasone qd. HbA1C was 5.5% on 3/22/18. Increase Levemir to 22u qam from 3/26/18. Continue high dose insulin aspart sliding scale prn.      6.   Chemotherapy induced pancytopenia: CBC revealed, Hb 8.6-->7.6-->9.8, WBC 5.6-->4.6-->4.7 and Plts 91-->72 on 3/25/18. S/P transfusion of 1u of PRBCs on 3/24/18      Addendum at 3.56pm: BNP done on 3/25/18 was 7826. D- Dimer was 0.5. The patient has some component of CHF. Will give IV lasix 40mg x 1 and reassess input/output in the am. For ECHO in the am.    DVT Prophylaxis: Ambulate every shift  Code Status: DNR/DNI    Disposition: Expected discharge in 2-3 days. For PT/OT/Speech therapist reviews.       Interval History   The pt was dyspneic on exertion such as speaking and ambulating yesterday evening. His O2 Sats dropped to 88% on RA at 1am this morning  and he was placed on 3L O2 via NC. He is afebrile. According to his nurse he was noted to cough with eating and drinking. The patient stated that his coughing with his meals is a chronic problem, but this has gotten worse in the last 2 days.     -Data reviewed today: I reviewed all new labs and imaging results over the last 24 hours. I personally reviewed no images or EKG's today.    Physical Exam   Temp: 98.1  F (36.7  C) Temp src: Oral BP: 158/74 Pulse: 66 Heart Rate: 73 Resp: 18 SpO2: 99 % O2 Device: Nasal cannula Oxygen Delivery: 3 LPM  Vitals:    03/22/18 1656 03/23/18 0400 03/24/18 0640   Weight: 94.2 kg (207 lb 10.8 oz) 95.4 kg (210 lb 6.4 oz) 95.1 kg (209 lb 9.6 oz)     Vital Signs with Ranges  Temp:  [96.3  F (35.7  C)-98.6  F (37  C)] 98.1  F (36.7  C)  Pulse:  [66-69] 66  Heart Rate:  [65-81] 73  Resp:  [16-18] 18  BP: (133-164)/(59-78) 158/74  SpO2:  [86 %-99 %] 99 %  I/O last 3 completed shifts:  In: 440 [P.O.:440]  Out: 425  [Urine:425]    Constitutional: Elderly white male, awake, cooperative, no apparent distress, O2 Sats 93% on RA  Respiratory: BS vesicular bilaterally, no crackles or wheezing  Cardiovascular: S1 and S2, reg, no murmur noted  GI: Soft, non-distended, non-tender, no masses, BS present+  Skin/Integumen: No rashes  Extremities: Mild bilateral pedal edema    Medications     - MEDICATION INSTRUCTIONS -         piperacillin-tazobactam  3.375 g Intravenous Q6H     sodium chloride (PF)  3 mL Intracatheter Q8H     insulin detemir  20 Units Subcutaneous QAM AC     insulin aspart  1-10 Units Subcutaneous TID AC     insulin aspart  1-7 Units Subcutaneous At Bedtime     metoprolol succinate  50 mg Oral Daily     acyclovir  400 mg Oral BID     amLODIPine  5 mg Oral Daily     ascorbic acid (VITAMIN C) tablet 1,000 mg  1,000 mg Oral Daily     dexamethasone  4 mg Oral Daily with breakfast     ferrous sulfate  325 mg Oral Daily with breakfast     gemfibrozil  600 mg Oral BID     levothyroxine  25 mcg Oral Daily     omeprazole  20 mg Oral QAM     cholecalciferol  1,000 Units Oral Daily       Data     Recent Labs  Lab 03/25/18  0545 03/24/18  1656 03/24/18  0600  03/23/18  0532 03/22/18  1335 03/22/18  0840   WBC 4.7  --  4.6  --   --  5.6 3.6*   HGB 9.8* 9.8* 7.6*  < >  --  8.6* 7.7*   MCV  --   --  113*  --   --  115* 114*   PLT  --   --  72*  --   --  91* 178   NA  --   --   --   --  135 140  --    POTASSIUM  --   --   --   --  4.2 3.5  --    CHLORIDE  --   --   --   --  106 108  --    CO2  --   --   --   --  20 23  --    BUN  --   --   --   --  36* 24  --    CR  --   --   --   --  1.06 0.95  --    ANIONGAP  --   --   --   --  9 9  --    MICHELLE  --   --   --   --  8.0* 8.5  --    GLC  --   --   --   --  446* 168*  --    ALBUMIN  --   --   --   --   --  2.7*  --    PROTTOTAL  --   --   --   --   --  7.8  --    BILITOTAL  --   --   --   --   --  0.3  --    ALKPHOS  --   --   --   --   --  86  --    ALT  --   --   --   --   --  15  --     AST  --   --   --   --   --  16  --    TROPI  --   --   --   --   --  <0.015  --    < > = values in this interval not displayed.    Recent Results (from the past 24 hour(s))   XR Chest 2 Views    Narrative    CHEST TWO VIEWS   3/25/2018 12:21 PM     HISTORY: Acute respiratory failure with right basal crackles, rule out  aspiration pneumonia.     COMPARISON: 1/20/2018.      Impression    IMPRESSION: New right basilar consolidation with right pleural  effusion concerning for pneumonia. There is also small left pleural  effusion and mild left basilar opacity. No pneumothorax visualized.  Cardiac silhouette is unchanged. There are atherosclerotic  calcifications of the aortic arch.    ANGELIQUE CANCINO MD

## 2018-03-25 NOTE — PROVIDER NOTIFICATION
Dr. Moya notified via web-page regarding pt's BNP result. Awaiting for response.   Addendum: Order received for IV lasix x1 and Echo.     1615: Dr. Barnett also notified that pt to start on lovenox SQ  per oncology order; however, pt noted to have a scant amount of nosebleed, and platelet count 82 K. Per MD, okay to give lovenox for now and continue to monitor. Okay to use PRN nasal saline spray as well.

## 2018-03-25 NOTE — PROVIDER NOTIFICATION
Pt reporting that he feels more weak/tired and short of breath. Pt noted to be dyspneic on exertion even with minimal activity. Pt also noted to be coughing while taking pills. Dr. Moya updated during rounds; orders received for CXR, PT/OT and speech eval.

## 2018-03-25 NOTE — PHARMACY-VANCOMYCIN DOSING SERVICE
Pharmacy Vancomycin Initial Note  Date of Service 2018  Patient's  [id number]  89 year old, male    Indication: Healthcare-Associated Pneumonia    Current estimated CrCl = Estimated Creatinine Clearance: 52.5 mL/min (based on Cr of 1.06).    Creatinine for last 3 days  3/23/2018:  5:32 AM Creatinine 1.06 mg/dL    Recent Vancomycin Level(s) for last 3 days  No results found for requested labs within last 72 hours.      Vancomycin IV Administrations (past 72 hours)      No vancomycin orders with administrations in past 72 hours.                Nephrotoxins and other renal medications (Future)    Start     Dose/Rate Route Frequency Ordered Stop    18 1500  piperacillin-tazobactam (ZOSYN) 4.5 g vial to attach to  mL bag     Comments:  Pharmacy can adjust dose based on renal function.    4.5 g  over 30 Minutes Intravenous EVERY 6 HOURS 18 1326      18 1430  vancomycin (VANCOCIN) 1,750 mg in sodium chloride 0.9 % 500 mL intermittent infusion      1,750 mg  over 2 Hours Intravenous EVERY 24 HOURS 18 1342      18 2100  acyclovir (ZOVIRAX) tablet 400 mg      400 mg Oral 2 TIMES DAILY 18 1719            Contrast Orders - past 72 hours     None                Plan:  1.  Start vancomycin  1750 mg IV q24h.   2.  Goal Trough Level: 15-20 mg/L   3.  Pharmacy will check trough levels as appropriate in 3-5 Days.    4. Serum creatinine levels will be ordered daily for the first week of therapy and at least twice weekly for subsequent weeks.    5. [place] method utilized to dose vancomycin therapy: Method 2    Jeane Hernandez, PharmD, BCPS

## 2018-03-25 NOTE — PROGRESS NOTES
03/25/18 1400   Swallow Eval: Clinical Impressions   Clinical Impression Comments Nursing and pt report coughing while taking pills.  Bedside evaluation completed.  Pt's oral [facility] evaluation was wnl.  He became very ill during chemo session and was admitted to hospital.  Pt is very SOB.  Positioned upright in bed.  Independent with feeding.  No overt s/s aspiration with thin liquids, pudding or cracker.  Oral transit times and oral prep times wfl all textures.  Laryngeal elevation all swallows with clear voicing.  Instructed pt on strategies for dealing with SOB including small bites, single sips, no straws, change of diet to DD3 from regular.  Pt reports that he does cut up his food quite a bit at home and was interested in trying DD3 diet/thin liquids.  Recommend:  DD3/thin liquids, in chair all meals, small bites/sips, alternate solids and liquids, slow rate of intake, no straws and medication crushed in applesauce.  SLP to follow 1-2 days for diet tolerance. Pt may need to continue DD3 if pursues chemotherapy.   Total Evaluation Time   Total Evaluation Time (Minutes) 15

## 2018-03-25 NOTE — PLAN OF CARE
Problem: Patient Care Overview  Goal: Plan of Care/Patient Progress Review  Outcome: Improving  Contact isolation for ESBL in urine. AOx4; [place]. VSS. Minimal SYED (back to baseline per pt, on RA). Up with SBA and walker, IV SL. Tele SR with HR of 72. Hx of insulin dependent DM, coverage before meals. One unit of blood completed upon my arrival to the unit. Recheck Hgb was 9.8, now stable. Another Hgb scheduled for 3/25 AM. Plan: D/C home tomorrow.

## 2018-03-25 NOTE — PROGRESS NOTES
"[facility] PHYSICIANS  HEMATOLOGY ONCOLOGY    DIAGNOSIS:    1- Kappa light chain IgM multiple myeloma in a 89-year-old patient.  Bone marrow biopsy on 11/17/2016 showed hypercellular bone marrow with 65% cellularity of light chain restricted plasma cells.  FISH or G-banding could not be performed due to insufficient sample.   There is a background of myelodysplastic syndrome.  The patient was initially seen in the hospital on 11/17/2016 for macrocytic anemia and pancytopenia and transfusion requirement and a bone marrow biopsy was performed.   2- History of prostate cancer s/p EBRT s/p brachytherapy in 2003 (recent PSA 0.10), superficial bladder cancer ,no recurrences since 1986      TREATMENT: 12/15/16 velcade/dex. 4/12/17 added cyclophosphamide 300 mg weekly.6/28/17 we discontinued velcade due to concern for neuropathy and continued with weekly cyclophosphamide and dexamethasone.   1/2/18 switched to Carfilzomib and dexamethasone.   3/22/18 started Daratumumab/Vekcade and Dex       SUBJECTIVE:  The patient was seen as a followup today. He was fatigued. He stated that he is bothered by dyspnea. His son was at bedside.      REVIEW OF SYSTEMS:  A complete review of systems was performed and found to be negative other than pertinent positives mentioned in history of present illness.      Past medical, social histories reviewed.     Meds- Reviewed.    PHYSICAL EXAMINATION:   /74 (BP Location: Left arm)  Pulse 66  Temp 98.1  F (36.7  C) (Oral)  Resp 18  Ht 1.778 m (5' 10\")  Wt 95.1 kg (209 lb 9.6 oz)  SpO2 99%  BMI 30.07 kg/m2   CONSTITUTIONAL: Laying on bed. Appears tired.   NEUROLOGIC: Alert, awake.   LYMPHATICS: No edema.   PSYCH: Mood and affect was normal.    LABORATORY DATA AND IMAGING REVIEWED DURING THIS VISIT:  Recent Labs   Lab Test  03/23/18   0532  03/22/18   1335   01/22/18   0837  01/21/18   0834  01/20/18   0220   NA  135  140   < >  141  140  139   POTASSIUM  4.2  3.5   < >  4.1 "  4.1  4.1   CHLORIDE  106  108   < >  109  108  107   CO2  20  23   < >  23  22  22   ANIONGAP  9  9   < >  9  10  10   BUN  36*  24   < >  25  29  45*   CR  1.06  0.95   < >  1.01  1.02  1.45*   GLC  446*  168*   < >  110*  183*  75   MICHELLE  8.0*  8.5   < >  8.9  8.8  9.1   MAG  1.8   --    --   2.3   --   2.2   PHOS   --    --    --    --   2.9  2.2*    < > = values in this interval not displayed.     Recent Labs   Lab Test  03/25/18   0545  03/24/18   1656  03/24/18   0600   03/22/18   1335  03/22/18   0840  03/14/18   1247   WBC  4.7   --   4.6   --   5.6  3.6*  4.1   HGB  9.8*  9.8*  7.6*   < >  8.6*  7.7*  8.0*   PLT  82*   --   72*   --   91*  178  72*   MCV   --    --   113*   --   115*  114*  111*   NEUTROPHIL   --    --    --    --   93.5  75.3  86.1    < > = values in this interval not displayed.     Recent Labs   Lab Test  03/22/18   1335  03/07/18   1130  02/07/18   1100   11/17/16   0938   BILITOTAL  0.3  0.5  0.4   < >   --    ALKPHOS  86  99  99   < >   --    ALT  15  14  14   < >   --    AST  16  15  14   < >   --    ALBUMIN  2.7*  3.1*  2.7*   < >   --    LDH   --    --    --    --   110    < > = values in this interval not displayed.         Results for orders placed or performed during the hospital encounter of 03/22/18   XR Chest 2 Views    Narrative    CHEST TWO VIEWS   3/25/2018 12:21 PM     HISTORY: Acute respiratory failure with right basal crackles, rule out  aspiration pneumonia.     COMPARISON: 1/20/2018.      Impression    IMPRESSION: New right basilar consolidation with right pleural  effusion concerning for pneumonia. There is also small left pleural  effusion and mild left basilar opacity. No pneumothorax visualized.  Cardiac silhouette is unchanged. There are atherosclerotic  calcifications of the aortic arch.    ANGELIQUE CANCINO MD     ASSESSMENT AND RECOMMENDATIONS:  This is a 89-year-old gentleman who has kappa light chain multiple myeloma with hypercellular marrow with 65% of cells  are light chain restricted plasma cells.  He had severe pancytopenia and has needed a transfusion in the hospital.  He did not have hypercalcemia and his renal function was normal. He has a background of myelodysplastic syndrome on his bone marrow biopsy; however, majority of the cell population was monoclonal plasma cell population.   He was started on treatment due to cytopenia.   In 4/2017 his light chain levels were getting worse. M spike was stable. We added cyclophosphamide to the regimen. In 6/2017 discontinued velcade for concern of development of neuropathy, in hindsight the pain appeared to be related to arthritis.   He is on Aranesp 300 mcg SQ every three weeks.  He was give a dose of daratumumab/Dex/Velcade on 3/22 and ended up in the hospital with Afib.     - CXR is consistent with pneumonia, he is on antibiotics.   - His risk of thrombosis is high. I have started him on Lovenox 40 mg SQ daily.  - Monitor daily CBC diff     ALISON JON MD    3/25/2018

## 2018-03-25 NOTE — PLAN OF CARE
Problem: Patient Care Overview  Goal: Plan of Care/Patient Progress Review  Discharge Planner SLP   Patient plan for discharge: home  Current status: Nursing and pt report coughing while taking pills.  Bedside evaluation completed.  [facility]'s [facility] was wnl.  He became very ill during chemo session and was admitted to hospital.  Pt is very SOB.  Positioned upright in bed.  Independent with feeding.  No overt s/s aspiration with thin liquids, pudding or cracker.  Oral transit times and oral prep times wfl all textures.  Laryngeal elevation all swallows with clear voicing.  Instructed pt on strategies for dealing with SOB including small bites, single sips, no straws, change of diet to DD3 from regular.  Pt reports that he does cut up his food quite a bit at home and was interested in trying DD3 diet/thin liquids.  Recommend:  DD3/thin liquids, in chair all meals, small bites/sips, alternate solids and liquids, slow rate of intake, no straws and medication crushed in applesauce.  SLP to follow 1-2 days for diet tolerance. Pt may need to continue DD3 if pursues chemotherapy.  Barriers to return to prior living situation: none  Recommendations for discharge: home, no SLP services  Rationale for recommendations: Pt will most likely meet goals while hospitalized, if not recommend ongoing SLP services.       Entered by: Kayode Solano 03/25/2018 1:49 PM

## 2018-03-26 NOTE — PROGRESS NOTES
Service Date: 03/26/2018      SUBJECTIVE:  Mr. Parkinson is a 89-year-old gentleman with IgM kappa multiple myeloma.  He also has myelodysplastic syndrome.  He is currently on treatment for multiple myeloma.      For myeloma, patient has received multiple different treatment.  Because of progression, he was just started on Velcade, daratumumab and dexamethasone on 03/23/2018.  During daratumumab infusion, patient had a reaction.  He developed shortness of breath, cough and palpitation.  EKG revealed atrial fibrillation with rapid ventricular rate.  He was sent to the Emergency Room and admitted.      I saw him in the cardiac unit.  The family was by the bedside.  The patient said that he was feeling a little better.  He was not having palpitations.  His shortness of breath was better.  He still felt weak.  No chest pain.      No headache.  Some dizziness.  No vomiting.  No bleeding.      I spoke to the nurse.  As per him also the patient's overall condition is better.      PHYSICAL EXAMINATION:   GENERAL:  He was alert and oriented x 3.     VITALS:  Reviewed.     The rest of the system not examined.      LABORATORY DATA:  Reviewed.      ASSESSMENT:   1.  A 89-year-old gentleman with multiple myeloma admitted with new onset atrial fibrillation.  Atrial fibrillation could be related to chemotherapy.   2.  Pancytopenia from myeloma and chemotherapy.   3.  Right lung pneumonia.      PLAN:   1.  Will discuss with him regarding myeloma.  He has started a new regimen of daratumumab, Velcade and dexamethasone on 03/22/2018.  Dr. Quinn will decide as an outpatient whether to continue him on that regimen or change it.   2.  He is pancytopenic.  CBC will be monitored and transfusion given as needed.   3.  The patient is on acyclovir, which should be continued.  His prophylaxis for herpes zoster while on chemotherapy for myeloma.     4.  He has pneumonia.  He is on antibiotics.  He is afebrile.   5.  Patient and family had a few  questions, which were all answered.  We will continue to follow him.      Total time spent 25 minutes, more than 50% of the time was spent in counseling and coordination of care.         NEW CLEMENTS MD             D: 2018   T: 2018   MT: TOD      Name:     JACOB MEDELLIN   MRN:      [id number]        Account:      [id number]   :      1926           Service Date: 2018      Document: [id number]

## 2018-03-26 NOTE — PROGRESS NOTES
03/26/18 1300   Quick Adds   Type of Visit Initial PT Evaluation   Living Environment   Lives With alone   Living Arrangements apartment   Home Accessibility no concerns   Transportation Available car   Living Environment Comment Pt lives alone in sr apt, no services available, pt drives.   Self-Care   Usual Activity Tolerance good   Current Activity Tolerance moderate   Regular Exercise no   Equipment Currently Used at Home walker, rolling   Functional Level Prior   Ambulation 1-->assistive equipment   Transferring 1-->assistive equipment   Toileting 1-->assistive equipment   Bathing 0-->independent   Dressing 0-->independent   Eating 0-->independent   Communication 0-->understands/communicates without difficulty   Swallowing 0-->swallows foods/liquids without difficulty   Cognition 0 - no cognition issues reported   Fall history within last six months yes   Number of times patient has fallen within last six months 2   Which of the above functional risks had a recent onset or change? ambulation   Prior Functional Level Comment pt manages all ADL's - drives to grocery store, makes own meals, cleans apt, etc. No family in town or friends to assist.   General Information   Onset of Illness/Injury or Date of Surgery - Date 03/22/18   Referring Physician Dr. Moya   Patient/Family Goals Statement hopes to go home when ready   Pertinent History of Current Problem (include personal factors and/or comorbidities that impact the POC) Pt was admitted from oncology clinic with reaction to chemo - had A fib with RVR. Also now has pneumonia. PMH includes multiple myeloma, MDS, bladder CA, prostate CA, A fib (not on anti-coagulation d/t lower GI bleed 1 year ago).    Precautions/Limitations fall precautions   General Info Comments Pt has 3 sons, none live in state. Son Bam is visiting from [place] until Sunday.   Cognitive Status Examination   Orientation orientation to person, place and time   Level of Consciousness alert   Follows  Commands and Answers Questions 100% of the time;able to follow multistep instructions   Personal Safety and Judgment intact   Memory intact   Cognitive Comment appears cognitively intact   Pain Assessment   Patient Currently in Pain No   Integumentary/Edema   Integumentary/Edema no deficits were identifed   Posture    Posture Not impaired   Range of Motion (ROM)   ROM Quick Adds No deficits were identified   Strength   Strength Comments LE strength grossly 4/5   Bed Mobility   Bed Mobility Comments supine to/from sit with SBA   Transfer Skills   Transfer Comments sit to/from stand with SBA   Gait   Gait Comments Gait 50' with walker and CGA. Vital signs monitored throughout session and remained stable. Pt on 2L O2 via NC, sats at rest 96%, post gait 94%. No SOB noted. /80 midway through session, HR 70's.   Balance   Balance Comments mildly unsteady, needs walker for upright balance. sitting balance IND.   Sensory Examination   Sensory Perception no deficits were identified   Coordination   Coordination no deficits were identified   Muscle Tone   Muscle Tone no deficits were identified   General Therapy Interventions   Planned Therapy Interventions balance training;gait training;strengthening;transfer training   Clinical Impression   Criteria for Skilled Therapeutic Intervention yes, treatment indicated   PT Diagnosis impaired functional mobility   Influenced by the following impairments generalized deconditioning, decreased balance   Functional limitations due to impairments decreased IND with all mobility   Clinical Presentation Stable/Uncomplicated   Clinical Presentation Rationale per clinical judgement   Clinical Decision Making (Complexity) Low complexity   Therapy Frequency` daily   Predicted Duration of Therapy Intervention (days/wks) 5 days   Anticipated Equipment Needs at Discharge (pt has walker)   Anticipated Discharge Disposition Home with Home Therapy;Transitional Care Facility  (pending progress  "and LOS)   Risk & Benefits of therapy have been explained Yes   Patient, Family & other staff in agreement with plan of care Yes   [facility] AM-PAC TM \"6 Clicks\"   2016, Trustees of [facility], under license to [employer].  All rights reserved.   6 Clicks Short Forms Basic Mobility Inpatient Short Form   [facility] AM-PAC  \"6 Clicks\" V.2 Basic Mobility Inpatient Short Form   1. Turning from your back to your side while in a flat bed without using bedrails? 4 - None   2. Moving from lying on your back to sitting on the side of a flat bed without using bedrails? 3 - A Little   3. Moving to and from a bed to a chair (including a wheelchair)? 3 - A Little   4. Standing up from a chair using your arms (e.g., wheelchair, or bedside chair)? 3 - A Little   5. To walk in hospital room? 3 - A Little   6. Climbing 3-5 steps with a railing? 2 - A Lot   Basic Mobility Raw Score (Score out of 24.Lower scores equate to lower levels of function) 18   Total Evaluation Time   Total Evaluation Time (Minutes) 15     "

## 2018-03-26 NOTE — PLAN OF CARE
Problem: Patient Care Overview  Goal: Plan of Care/Patient Progress Review  OT: Eval complete and tx initiated. Pt admitted for A Fib with RVR and multiple myeloma. Prior to admit pt lives alone at an independent living facility. Pt reports mod I-I in all ADLs/IADLs including driving and med mgmt.    Discharge Planner OT   Patient plan for discharge: Open to TCU  Current status: Pt required SBA and FWW to complete toilet transfer. Pt required SBA and FWW to complete 2 g/h tasks while standing at sink. Pt O2 sats at 89% on room air during activity, thus reapplied 2 L O2.   Barriers to return to prior living situation: Lives alone; current supplemental O2 needs  Recommendations for discharge: TCU  Rationale for recommendations: Pt is currently limited by decreased activity tolerance and balance. Pt would benefit from continued therapy during hospitalization to increase I in ADLs.        Entered by: Isaias Oconnell 03/26/2018 3:58 PM

## 2018-03-26 NOTE — PROGRESS NOTES
03/26/18 1500   Quick Adds   Type of Visit Initial Occupational Therapy Evaluation   Living Environment   Lives With alone   Living Arrangements independent living facility   Home Accessibility no concerns   Transportation Available car   Self-Care   Dominant Hand right   Usual Activity Tolerance good   Current Activity Tolerance fair   Equipment Currently Used at Home walker, rolling;grab bar   Functional Level Prior   Ambulation 1-->assistive equipment   Transferring 1-->assistive equipment   Toileting 0-->independent  (comfort height)   Bathing 1-->assistive equipment   Dressing 0-->independent   Eating 0-->independent   Communication 0-->understands/communicates without difficulty   Swallowing 0-->swallows foods/liquids without difficulty   Cognition 0 - no cognition issues reported   Fall history within last six months yes   Number of times patient has fallen within last six months 2   General Information   Onset of Illness/Injury or Date of Surgery - Date 03/22/18   Referring Physician Micky Moya MD   Patient/Family Goals Statement TCU, but prefer home   Additional Occupational Profile Info/Pertinent History of Current Problem Pt admitted for A fib with RVR and multiple myeloma.   Precautions/Limitations fall precautions;oxygen therapy device and L/min  (Pt on 2 L O2 at eval; no supplemental O2 at baseline)   Cognitive Status Examination   Orientation orientation to person, place and time   Level of Consciousness alert   Able to Follow Commands WNL/WFL   Personal Safety (Cognitive) WNL/WFL   Memory (will continue to monitor and assess as needed)   Visual Perception   Visual Perception Wears glasses   Pain Assessment   Patient Currently in Pain No   Range of Motion (ROM)   ROM Comment B UE WFL   Strength   Strength Comments B UE 5/5 MMT   Mobility   Bed Mobility Bed mobility skill: Rolling/Turning;Bed mobility skill: Scooting/Bridging;Bed mobility skill: Sit to supine;Bed mobility skill: Supine to sit;Bed  mobility analysis   Bed Mobility Skill: Rolling/Turning   Level of [place] - Bed Mobility Skill Rolling Turning stand-by assist   Bed Mobility Skill: Scooting/Bridging   Level of [place]: Scooting/Bridging stand-by assist   Bed Mobility Skill: Sit to Supine   Level of [place]: Sit/Supine stand-by assist   Bed Mobility Skill: Supine to Sit   Level of [place]: Supine/Sit stand-by assist   Bed Mobility Analysis   Impairments Contributing to Impaired Bed Mobility impaired balance   Transfer Skills   Transfer Transfer Safety Analysis Bed/Chair;Transfer Skill: Stand to Sit;Transfer Safety Analysis Sit/Stand   Transfer Skill: Bed to Chair/Chair to Bed   Level of [place]: Bed to Chair stand-by assist   Assistive Device - Transfer Skill Bed to Chair Chair to Bed Rehab Eval standard walker   Transfer Safety Analysis Bed/Chair   Impairments Contributing to Impaired Transfers impaired balance   Transfer Skill: Sit to Stand   Level of [place]: Sit/Stand stand-by assist   Assistive Device for Transfer: Sit/Stand standard walker   Transfer Safety Analysis Sit/Stand   Impaired Transfers: Sit/Stand impaired balance   Toilet Transfer   Toilet Transfer Toilet Transfer Skill;Toilet Transfer Safety Analysis   Transfer Skill: Toilet Transfer   Level of [place]: Toilet stand-by assist   Assistive Device standard walker   Transfer Safety Analysis Toilet   Transfer Safety Analysis Toilet impaired balance   Upper Body Dressing   Level of [place]: Dress Upper Body stand-by assist   Lower Body Dressing   Level of [place]: Dress Lower Body minimum assist (75% patients effort)   Toileting   Level of [place]: Toilet stand-by assist   Grooming   Level of [place]: Grooming stand-by assist   Instrumental Activities of Daily Living (IADL)   Previous Responsibilities meal prep;housekeeping;laundry;medication management;driving   IADL Comments Uses pillbox   Activities of Daily Living Analysis  "  Impairments Contributing to Impaired Activities of Daily Living balance impaired   General Therapy Interventions   Planned Therapy Interventions ADL retraining;transfer training   Clinical Impression   Criteria for Skilled Therapeutic Interventions Met yes, treatment indicated   OT Diagnosis Decreased I in ADLs/IADLs   Influenced by the following impairments Decreased balance, activity tolerance   Assessment of Occupational Performance 1-3 Performance Deficits   Identified Performance Deficits Bathing, dressing, IADLs   Clinical Decision Making (Complexity) Low complexity   Therapy Frequency daily   Predicted Duration of Therapy Intervention (days/wks) 3 days   Anticipated Discharge Disposition Transitional Care Facility   Risks and Benefits of Treatment have been explained. Yes   Patient, Family & other staff in agreement with plan of care Yes   [facility] TM \"6 Clicks\"   2016, Trustees of [facility], under license to [employer].  All rights reserved.   6 Clicks Short Forms Daily Activity Inpatient Short Form   [facility]  \"6 Clicks\" Daily Activity Inpatient Short Form   1. Putting on and taking off regular lower body clothing? 3 - A Little   2. Bathing (including washing, rinsing, drying)? 3 - A Little   3. Toileting, which includes using toilet, bedpan or urinal? 3 - A Little   4. Putting on and taking off regular upper body clothing? 3 - A Little   5. Taking care of personal grooming such as brushing teeth? 4 - None   6. Eating meals? 4 - None   Daily Activity Raw Score (Score out of 24.Lower scores equate to lower levels of function) 20   Total Evaluation Time   Total Evaluation Time (Minutes) 5     "

## 2018-03-26 NOTE — PLAN OF CARE
Problem: Patient Care Overview  Goal: Plan of Care/Patient Progress Review  PT - Eval completed, treatment initiated. Pt is a 89 y/o male admitted after having a reaction to chemo at his outpatient oncology clinic (pt is being treated for multiple myeloma), A fib with RVR, has also now been diagnosed with pneumonia. Pt normally lives in IND senior apt, does not have any services or assistance. Pt drives, gets own groceries, prepares his own meals, cleans his apt, etc. Pt does have 3 sons, all live out of state. Bam, who lives in [place], is currently in town visiting until Sunday. Pt normally amb with walker, was hospitalized recently and spent 2 weeks at [facility] for TCU. Currently, pt presents with flat affect, ambulated 150' with wh walker and CGA, in/out of bed with min assist 1. Pt moves well, but needs some assist with bed mobility and will likely have difficulty managing IND at home. Recommend TCU at discharge, await OT eval and recs.    Discharge Planner PT   Patient plan for discharge: home vs TCU, pt unsure  Current status: See above, pt is amb with walker and SBA, bed mobility with min assist 1.  Barriers to return to prior living situation: Pt lives alone, has no help avail from family as they live out of state  Recommendations for discharge: TCU  Rationale for recommendations: Pt would benefit from short course of TCU to maximize functional IND and safety to allow discharge home. If he is here for several days and is able to regain his independence with mobility could go directly home.       Entered by: Agustina Cardoso 03/26/2018 12:04 PM

## 2018-03-26 NOTE — PLAN OF CARE
Problem: Patient Care Overview  Goal: Plan of Care/Patient Progress Review  Outcome: Improving  Good urine output after Lasix yesterday. Reports improved breathing/ less dyspnea. SaO2 stable on 2L. Lungs dim. No cough. Plan to have ECHO today. Tele SR. One episode of back discomfort after repositioning in bed, relieved by heat. Pills crushed in applesauce. IV abx.

## 2018-03-26 NOTE — PLAN OF CARE
Alert and oriented x4. VSS. Denies pain. Up with one and walker. Tele NSR. Blood glucose 116, 286, 281. Meds crushed in applesauce. Voiding in urinal. Vanco discontinued. Plan to discharge in 1-2 days, will continue to monitor.

## 2018-03-26 NOTE — PLAN OF CARE
Problem: Patient Care Overview  Goal: Plan of Care/Patient Progress Review  Outcome: No Change  Pt A&Ox4; calm and cooperative. Hypertensive; Norvasc dose increased today. Early this AM, pt c/o increased fatigue and shortness of breath. Pt noted to be SYED even with minimal activity (turning/repositioning, trips to the bathroom). Desated into mid to high 80s while on RA but improved into the 90s with supplemental O2 at 2-3L. Nonsustained tachycardia in the 110s with dyspnea. CXR done; pt started on IV antibiotics for pneumonia. BNP elevated; IV lasix x1 given (voiding adequately). Incentive Spirometer given/ encouraged with use. PRN xopenex for sob/ exp wheezes. This evening, pt reports slight improvement in his breathing compared to this AM.  Denies pain. C/o constipation in the AM but had BM this evening; thus, declined stool softener. Noted to cough while taking pills this AM; speech eval completed; DD3 diet ordered/ Tolerating. Administer pills crushed with apple sauce per speech's recommendation. Up with assist of 1/walker; has generalized weakness; PT/OT consulted. Will continue to monitor.

## 2018-03-26 NOTE — PROGRESS NOTES
[facility]    Hospitalist Progress Note  Micky Moya MD    Assessment & Plan     89-year-old gentleman, with PmHx of multiple myeloma initially diagnosed in 11/2016, MDS, prostate carcinoma, bladder carcinoma, paroxysmal atrial fibrillation, Hypertension, Hyperlipidemia, pancytopenia due to multiple myeloma and chemotherapy, who was admitted on 3/22/18 from the Oncology Clinic, due to symptoms of cough, shortness of breath, palpitations and tremors, 1.5hrs into receiving his 1st cycle of daratumumab/dexamethasone. He was found to be in AFIB rate 140s-150s and sent to the ER.    EKG on 3/22/18 showed, atrial fibrillation with ventricular rate of 145. Work up done on 3/22/18 revealed, CMP significant for Alb 2.7. CBC revealed, Hb 8.6, WBC 5.6 and Plts 91. Troponin was <0.015, lactic acid 2.2.        1.   S/P Paroxysmal Atrial fibrillation with rapid ventricular response: now in NSR, with HR in the 60s and 70s. S/P IV diltiazem 10mg x 2 doses and IV diltiazem drip. Continue toprol XL 50mg qd.The pt is not on anticoagulation due to history of lower gastrointestinal bleed.  ECHO on 01/11/2018 showed, LVEF 65%, no regional wall motion abnormalities, RV was normal, no hemodynamically significant valvular abnormalities. TSH on 3/23/18 was normal.      2.  Possible healthcare associated bibasal pneumonia R>L, with right pleural effusion and hypoxic respiratory failure: the pt is immunocompromised with underlying active myeloma and recent chemotherapy. Discontinued IV Vancomycin on 3/26/18. Continue IV Zosyn and IV levaquin. For Xopenex nebs prn.  Continue 2L O2 via NC. For Robitussin DM prn. WBC was 4.7-->3.7 on 3/26/18. Appreciate speech therapist input. No aspiration seen. Continue dysphagia type 3 diet with thin liquids.     3.  Mild congetive heart failure: BNP was 7826 on 3/25/18. D-dimer on 3/25/18 was normal. S/P IV lasix 40mg x 1 dose and patient diuresed 3,150mls on 3/25/18. Net  input/output in the last 24hrs was -880mls. ECHO on 3/26/18 revealed, LVEF 55-60%, LV and RV size are normal. LA is mildly dilated.   RA is normal. No regional wall motion abnormalities were noted.     4.   Multiple myeloma:  Appreciate Oncology input. pt was commenced on daratumumab/dexamethasone on 3/22/18,  due to progression of his myeloma. Continue acyclovir prophylaxis for herpes zoster. For outpt Oncology follow up.      5.   Hypertension: SBP is ranging 140s-->150s. Continue amlodipine 10mg qd and toprol 50mg XL.    6.   Type 2, Diabetes mellitus: BG was 116 this am. Pt is on oral dexamethasone qd. HbA1C was 5.5% on 3/22/18. Continue Levemir 22u qam. Continue high dose insulin aspart sliding scale prn.      7.   Chemotherapy induced pancytopenia: CBC revealed, Hb 8.6-->7.6-->9.6, WBC 5.6-->4.6-->3.7 and Plts 91-->72-->94 on 3/26/18. S/P transfusion of 1u of PRBCs on 3/24/18. Monitor CBC.    8.   Hypothyroidism: continue levothyroxine.     9.   GERD Prophylaxis: continue omeprazole.        DVT Prophylaxis: Lovenox.  Code Status: DNR/DNI    Disposition: Expected discharge in 1-2 days. For PT/OT/Speech therapist reviews.       Interval History   The pt was dyspneic on exertion such as speaking and ambulating yesterday evening. His O2 Sats dropped to 88% on RA at 1am this morning  and he was placed on 3L O2 via NC. He is afebrile. According to his nurse he was noted to cough with eating and drinking. The patient stated that his coughing with his meals is a chronic problem, but this has gotten worse in the last 2 days.     -Data reviewed today: I reviewed all new labs and imaging results over the last 24 hours. I personally reviewed no images or EKG's today.    Physical Exam   Temp: 97.8  F (36.6  C) Temp src: Oral BP: 156/81   Heart Rate: 70 Resp: 18 SpO2: 97 % O2 Device: Nasal cannula Oxygen Delivery: 2 LPM  Vitals:    03/23/18 0400 03/24/18 0640 03/26/18 0700   Weight: 95.4 kg (210 lb 6.4 oz) 95.1 kg (209 lb  9.6 oz) 91.7 kg (202 lb 3.2 oz)     Vital Signs with Ranges  Temp:  [96.4  F (35.8  C)-97.8  F (36.6  C)] 97.8  F (36.6  C)  Heart Rate:  [65-70] 70  Resp:  [18-20] 18  BP: (145-156)/(69-81) 156/81  SpO2:  [95 %-98 %] 97 %  I/O last 3 completed shifts:  In: 1400 [P.O.:700; I.V.:700]  Out: 3150 [Urine:3150]    Constitutional: Elderly white male, awake, cooperative, no apparent distress, O2 Sats 93% on RA  Respiratory: BS vesicular bilaterally, no crackles or wheezing  Cardiovascular: S1 and S2, reg, no murmur noted  GI: Soft, non-distended, non-tender, no masses, BS present+  Skin/Integumen: No rashes  Extremities: Mild bilateral pedal edema    Medications     - MEDICATION INSTRUCTIONS -         levofloxacin  500 mg Intravenous Q24H     piperacillin-tazobactam  4.5 g Intravenous Q6H     amLODIPine  10 mg Oral Daily     insulin detemir  22 Units Subcutaneous QAM AC     vancomycin (VANCOCIN) IV  1,750 mg Intravenous Q24H     enoxaparin  40 mg Subcutaneous Q24H     sodium chloride (PF)  3 mL Intracatheter Q8H     insulin aspart  1-10 Units Subcutaneous TID AC     insulin aspart  1-7 Units Subcutaneous At Bedtime     metoprolol succinate  50 mg Oral Daily     acyclovir  400 mg Oral BID     ascorbic acid (VITAMIN C) tablet 1,000 mg  1,000 mg Oral Daily     dexamethasone  4 mg Oral Daily with breakfast     ferrous sulfate  325 mg Oral Daily with breakfast     gemfibrozil  600 mg Oral BID     levothyroxine  25 mcg Oral Daily     omeprazole  20 mg Oral QAM     cholecalciferol  1,000 Units Oral Daily       Data     Recent Labs  Lab 03/26/18  0607 03/25/18  0545 03/24/18  1656 03/24/18  0600  03/23/18  0532 03/22/18  1335   WBC 3.7* 4.7  --  4.6  --   --  5.6   HGB 9.6* 9.8* 9.8* 7.6*  < >  --  8.6*   *  --   --  113*  --   --  115*   PLT 94* 82*  --  72*  --   --  91*     --   --   --   --  135 140   POTASSIUM 3.6  --   --   --   --  4.2 3.5   CHLORIDE 108  --   --   --   --  106 108   CO2 25  --   --   --   --   20 23   BUN 22  --   --   --   --  36* 24   CR 0.91  --   --   --   --  1.06 0.95   ANIONGAP 8  --   --   --   --  9 9   MICHELLE 8.7  --   --   --   --  8.0* 8.5   *  --   --   --   --  446* 168*   ALBUMIN  --   --   --   --   --   --  2.7*   PROTTOTAL  --   --   --   --   --   --  7.8   BILITOTAL  --   --   --   --   --   --  0.3   ALKPHOS  --   --   --   --   --   --  86   ALT  --   --   --   --   --   --  15   AST  --   --   --   --   --   --  16   TROPI  --   --   --   --   --   --  <0.015   < > = values in this interval not displayed.    No results found for this or any previous visit (from the past 24 hour(s)).

## 2018-03-26 NOTE — PLAN OF CARE
Problem: Patient Care Overview  Goal: Plan of Care/Patient Progress Review  Speech Language Therapy Discharge Summary    Reason for therapy discharge:    All goals and outcomes met, no further needs identified.    Progress towards therapy goal(s). See goals on Care Plan in [facility] electronic health record for goal details.  Goals met    Therapy recommendation(s):    No further therapy is recommended.  SLP: Pt upright in chair with son present. Pt reported enjoying DD3 diet and is not interested in regular diet as he follows soft diet at home. Pt reported improved ability to tolerate meds crushed. Pt does not want to try meds whole. Pt educated on dysphagia that appears to be at baseline and is being managed with diet/medication modifications. Pt and son verbalized agreement for ST to sign off. Recommended: continue dysphagia diet level 3 and thin liquids with meds crushed (pt prefers applesauce).

## 2018-03-27 NOTE — TELEPHONE ENCOUNTER
This clinician received request from Bam late in day yesterday regarding additional resources and information regarding alternative housing options for pt. This clinician spoke with AMRITA Peck ([phone]) who will follow-up with pt and family regarding discharge planning and will also follow-up regarding alternative housing options. This clinician is appreciative of inpt SW involvement, and will continue discussions with family after discharge.     Please page in the case of psychosocial distress or concern.     WESLEY Negro, [facility] Hospital[facility]  Phone: [phone]  Pager: [phone]    Flovillaradha Scott: M, T  *every other Thursday, 8am-4:30pm  Corky Villela: W, F, *every other Thursday, 8am-4:30pm

## 2018-03-27 NOTE — PLAN OF CARE
Problem: Patient Care Overview  Goal: Plan of Care/Patient Progress Review  Outcome: No Change  Pt A&Ox4.  VSS on 2L oxygen via NC.  Tele NSR.  Denies pain.  Up with SBA and walker.  Voiding in urinal.  IV SL.  Contact precautions maintained.  DD3 diet with thin liquids.  BG = 71 at 0200.  Orange juice given, follow-up BG = 130.  Medications crushed with applesauce.  Plan to discharge in 1-2 days.  Nursing to continue to monitor.

## 2018-03-27 NOTE — PROGRESS NOTES
Mr. Parkinson is a 89-year-old gentleman with IgM kappa multiple myeloma and myelodysplastic syndrome.  He is getting treatment for multiple myeloma. He was started on Velcade, daratumumab and dexamethasone on 03/22/2018.  During daratumumab infusion on 03/23/2018, patient developed shortness of breath, cough and palpitation.  EKG revealed atrial fibrillation with rapid ventricular rate. He was admitted for atrial fibrillation. He is in sinus rhythm.      I saw him in the cardiac unit. Son was there.  Patient is feeling better. He feels little stronger. No palpitations. Shortness of breath was better. No chest pain. No headache.  Some dizziness. No bleeding.       PHYSICAL EXAMINATION:   Alert and oriented x 3.     VITALS:  Reviewed.     Rest of the system not examined.       LABORATORY DATA:  Reviewed.       ASSESSMENT:   1.  A 89-year-old gentleman with multiple myeloma admitted with atrial fibrillation.  Atrial fibrillation could be related to chemotherapy.   2.  Pancytopenia from myeloma and chemotherapy.   3.  Pneumonia.       PLAN:   1.  Patient's overall condition is improving.  Patient is being discharged to TCU.    Discussed with him regarding treatment for myeloma.  I told the patient that we will hold his treatment while he is in TCU.  After discharge, he will be reevaluated in oncology clinic.  Depending on his overall condition, chemotherapy for multiple myeloma will be restarted.  Patient and son agreeable for this plan.    2.  Patient is pancytopenic.  CBC should be monitored once a week.    3.  Son had a few questions, which were all answered.  Patient will follow-up in oncology clinic after discharge from TCU.

## 2018-03-27 NOTE — PLAN OF CARE
Problem: Patient Care Overview  Goal: Plan of Care/Patient Progress Review  Discharge Planner PT   Patient plan for discharge: Home vs TCU, pt is unsure  Current status: Upon arrival pt was concerned about his low blood sugars.  Nursing was notified and nurse brought OJ.  Pt amb 250' w/FWW and CGA.  Several standing rest breaks were required.  Pt able to perform sit to stand w/CGA.   Barriers to return to prior living situation: lives alone, has no help from [place], stairs not yet assessed and level of assist.   Recommendations for discharge: TCU per PT POC  Rationale for recommendations: PT would continue to progress towards functional independence with skilled physical therapy.        Entered by: Carol Ann Woodard 03/27/2018 8:58 AM

## 2018-03-27 NOTE — TELEPHONE ENCOUNTER
I received a call from [facility] [phone] requesting clarification on medication dexamethasone and Acyclovir. I stated I would ask Dr. Villaseñor and call back.     I spoke with Dr. Villaseñor and he ordered for patient to continue Acyclovir while in the TCU and the Dexamethasone could be discontinued.    I returned call and spoke with Ame who is the bedside nurse working with patient today and provided the above instructions. She verbalized understanding.    Jeanie ARGUETA to follow up with as needed. Meghan Melgar

## 2018-03-27 NOTE — PLAN OF CARE
Problem: Patient Care Overview  Goal: Plan of Care/Patient Progress Review  Discharge Planner OT   Patient plan for discharge: TCU.  Current status: SB A for amb in room with walker. I to don underwear at toilet, hygeine and g/h at sink.  Barriers to return to prior living situation: Needs A with ADL and mobility.   Recommendations for discharge: TCU.  Rationale for recommendations: Lives alone, was very I PTA.        Entered by: Bere Narvaez 03/27/2018 9:37 AM       Occupational Therapy Discharge Summary    Reason for therapy discharge:    Discharged to transitional care facility.    Progress towards therapy goal(s). See goals on Care Plan in [facility] electronic health record for goal details.  Goals not met.  Barriers to achieving goals:   discharge from facility.    Therapy recommendation(s):    Continued therapy is recommended.  Rationale/Recommendations:  To maximize I in ADL/IADL.

## 2018-03-27 NOTE — PROGRESS NOTES
Service Date: 03/26/2018       SUBJECTIVE:    Mr. Parkinson is a 89-year-old gentleman with IgM kappa multiple myeloma.  He also has myelodysplastic syndrome.  He is currently on treatment for multiple myeloma.       For myeloma, patient has received multiple different treatment.  Because of progression, he was started on Velcade, daratumumab and dexamethasone on 03/22/2018.  During daratumumab infusion on 03/23/2018, patient had reaction.  He developed shortness of breath, cough and palpitation.  EKG revealed atrial fibrillation with rapid ventricular rate.  He was evaluated in Emergency Room and admitted.       I saw him in the cardiac unit. Family was by the bedside.  Patient said that he was feeling a little better.  He was not having palpitations.  His shortness of breath was better.  He still felt weak.  No chest pain.       No headache.  Some dizziness.  No vomiting.  No bleeding.       I spoke to the nurse.  As per him also, patient's overall condition is better.       PHYSICAL EXAMINATION:   GENERAL:  He was alert and oriented x 3.     VITALS:  Reviewed.     Rest of the system not examined.       LABORATORY DATA:  Reviewed.       ASSESSMENT:   1.  A 89-year-old gentleman with multiple myeloma admitted with new onset atrial fibrillation.  Atrial fibrillation could be related to chemotherapy.   2.  Pancytopenia from myeloma and chemotherapy.   3.  Right lung pneumonia.       PLAN:   1.  Discussed with him regarding myeloma.  He started a new regimen of daratumumab, Velcade and dexamethasone on 03/22/2018.  Dr. Quinn will decide as an outpatient whether to continue him on that regimen or change it.   2.  He is pancytopenic.  CBC will be monitored and transfusion given as needed. Neupogen will be given as needed.   3.  Patient is on acyclovir, which should be continued. It is prophylaxis for herpes zoster while on chemotherapy for myeloma.     4.  He has pneumonia.  He is on antibiotics.  He is afebrile.   5.   Patient and family had a few questions, which were all answered.  We will continue to follow him.       Total time spent 25 minutes, more than 50% of the time was spent in counseling and coordination of care.

## 2018-03-27 NOTE — PROGRESS NOTES
Patient a/o did well on ra. Up walkinglow ac dealt with given insulin at b12. Tele NSR. Patient dced with son and belongings to tcu.

## 2018-03-27 NOTE — PROGRESS NOTES
Per Dr. Villaseñor, chemotherapy will be on hold while the pt goes to TCU and will be revaluated in clinic after pt discharges from TCU.

## 2018-03-27 NOTE — PROGRESS NOTES
Care Transition Initial Assessment -   Reason For Consult: discharge planning  Met with: Patient and Family  (son Bam)  Active Problems:    Atrial fibrillation with RVR (H)       DATA  Lives With: alone  Living Arrangements: apartment  Description of Support System: Supportive, Involved  Who is your support system?: Children  Support Assessment: Adequate family and caregiver support.   Identified issues/concerns regarding health management:      Quality Of Family Relationships: supportive, involved  Transportation Available: car    Per social service protocol discharge planning.  Patient was admitted on 3-22-18 with atrial fibrillation with RVR.  The tentative date of discharge is 3-27-18. Reviewed chart and spoke with patient regarding discharge plans.  Per patient report, patient lives alone in an apartment at [facility].  Patient uses a rolling walker at baseline,  Patient has grab bars in the bathroom.  Patient is independent with ADL's.  Reviewed the therapy discharge recommendations of tcu placement on discharge and patient is in agreement.  Patient states he would like to go to [facility] on discharge as he was just recently there.  Explained that there is a $36 per day amenity fee and patient is in agreement.  A second choice is [facility].  Referrals sent, via discharge on the double, to check bed availability.  Also spoke with patient's son Bam in the family lounge regarding potential assisted living in the future.  Gave patient's son the senior housing guide and discussed possible options.  Explained the financial impact.  Patient's son was inquiring about the potential for medical assistance in the future.  Explained that [place] is income and asset based.  Explained that [place] does not cover the assisted living, this would be the elderly waiver and he would need an assessment done.  Explained that the  at the facility could help guide them a little bit more with this as well.   Patient's son states he understands.  Received a call back from [facility].  They have a bed available for today.  Patient's son states he will transport around 15:00 today.  Received discharge orders for patient.  Updated [facility] and faxed the orders and the PAS.  Also received a call from Larissa Obando, outpatient oncology social worker, stating that she received an email from patient's son Bam requesting information on assisted living.  Explained that I will speak with patient's son about this.    ASSESSMENT  Cognitive Status:  awake  Concerns to be addressed: discharge planning.     PLAN  Financial costs for the patient includes private room amenity fees  Patient given options and choices for discharge TCU choices.  Patient/family is agreeable to the plan?  Yes  Patient Goals and Preferences: TCU on discharge.  Patient anticipates discharging to:  [facility].    WESLEY Thompson, [facility]  [phone]    PAS-RR    D: Per DHS regulation, SW completed and submitted PAS-RR to [place] [employer] Direct Connect via the Senior LinkAge Line.  PAS-RR confirmation # is : [id number].    I: SW spoke with patient and son and they are aware a PAS-RR has been submitted.  SW reviewed with patient and son that they may be contacted for a follow up appointment within 10 days of hospital discharge if their SNF stay is < 30 days.  Contact information for [facility] LinkAge Line was also provided.    A: Patient and son verbalized understanding.    P: Further questions may be directed to [facility] LinkAge Line at #[phone], option #4 for PAS-RR staff.

## 2018-03-27 NOTE — PLAN OF CARE
Problem: Patient Care Overview  Goal: Plan of Care/Patient Progress Review  Outcome: No Change  Pt A&Ox4.  VSS on 2L oxygen via NC.  Tele NSR.  Denies pain.  Up with SBA and walker.  Voiding in urinal.  IV SL.  Contact precautions maintained.  DD3 diet with thin liquids.  BG = 153 at HS.  Medications crushed with applesauce.  Plan to discharge in 1-2 days.  Nursing to continue to monitor.

## 2018-03-28 NOTE — PROGRESS NOTES
Clinic Care Coordination Contact  Care Coordination Transition Communication    Referral Source: IP Handoff    Clinical Data: Patient was hospitalized at [facility] from 3/22/18 to 3/27/18 with diagnosis of atrial fib with RVR, suspected pneumonia, multiple myeloma.     Transition to Facility:              Facility Name: [facility]              Contact name and phone number/fax: Sherri CROWE [phone]    Plan: RN/SW Care Coordinator will await notification from facility staff informing RN/SW Care Coordinator of patient's discharge plans/needs. RN/SW Care Coordinator will review chart and outreach to facility staff every 4 weeks and as needed.     Olga Lidia Oneil RN, CCM - Care Coordinator     3/28/2018    8:18 AM  [phone]

## 2018-03-28 NOTE — PROGRESS NOTES
"[place] [facility]  PRIMARY CARE PROVIDER AND CLINIC:  [facility]on Reich [address] [facility] [address]  Chief Complaint   Patient presents with     Hospital F/U     Establish Care       HPI:    Roc Parkinson is a 89 year old  (7/7/1926),admitted to the [facility] from [facility].  Hospital stay 3/22/18 through 3/27/18.  Admitted to this facility for  rehab, medical management and nursing care.  HPI information obtained from: facility chart records, facility staff, patient report and [facility] chart review.  Current issues are:         Paroxysmal atrial fibrillation with RVR (H)  Pneumonia of both lungs due to infectious organism, unspecified part of lung  Chronic diastolic congestive heart failure (H)  Multiple myeloma not having achieved remission (H)  Essential hypertension  Type 2 diabetes mellitus without complication, with long-term current use of insulin (H)  Chemotherapy-induced neutropenia (H)  Hypothyroidism, unspecified type  Gastroesophageal reflux disease, esophagitis presence not specified  Physical deconditioning     Hospital Course     \"Roc Parkinson was admitted on 3/22/2018.  The following problems were addressed during his hospitalization:     Paroxysmal Atrial fibrillation with rapid ventricular response:  Presented with RVR, currently in NSR. S/P IV diltiazem 10mg x 2 doses and IV diltiazem drip.  Transitioned to Toprol XL 50 mg qd.The pt is not on anticoagulation due to history of lower gastrointestinal bleed.  ECHO on 01/11/2018 showed, LVEF 65%, no regional wall motion abnormalities, RV was normal, no hemodynamically significant valvular abnormalities. TSH on 3/23/18 was normal.    -- will continue Toprol XL daily. PTA Lopressor d/c'd      Suspected bibasal pneumonia R>L, with right pleural effusion and hypoxic respiratory failure:   The pt is immunocompromised with underlying active myeloma and recent " "chemotherapy. Initially treated with Vanco/zosyn and Levaquin. Cultures remain negative. Weaned off oxygen this morning. Vanco discontinued yesterday and d/c zosyn today.    - will discharge on Levaquin daily x 7 more days to complete 10 days course.   - SLP vitaly completed. No aspiration noted. Discharging on DD3 diet and patient prefers that.   - weaned off oxygen this AM      Mild congestive heart failure: BNP was 7826 on 3/25/18. D-dimer on 3/25/18 was normal. S/P IV lasix 40 mg x 1 dose with good output 3/25/18. ECHO on 3/26/18 revealed, LVEF 55-60%, LV and RV size are normal. LA is mildly dilated.   RA is normal. No regional wall motion abnormalities were noted  - resume PTA lasix at discharge.       Multiple myeloma:  Appreciate Oncology input. pt was commenced on daratumumab/dexamethasone on 3/22/18,  due to progression of his myeloma. Continue acyclovir prophylaxis for herpes zoster. outpt Oncology follow up.       Hypertension:  Continue amlodipine 10 mg qd and Toprol 50 mg XL.      Type 2, Diabetes mellitus:  Pt is on oral dexamethasone qd with chemotherapy. HbA1C was 5.5% on 3/22/18. Continue Levemir  Decreased to 10 units qd. Stop glipizide. Continue mealtime Aspart per PTA regimen     Chemotherapy induced pancytopenia:   Hemoglobin Otoniel 7.6 s/p 1 unit pRBC in hospital. Hgb 10 on day of discharge.   WBC 3.5, ANC 2.6 on day of discharge  Plat 96.   - no signs of active bleed and stable hemodynamically     Hypothyroidism: continue levothyroxine.       GERD Prophylaxis: continue omeprazole\"           No CP, SOB, Cough, dizziness, fevers, chills, HA, N/V, dysuria--has chronic urinary incontinence or Bowel Abnormalities except some loose BM. Appetite is good.  No pain.    CODE STATUS/ADVANCE DIRECTIVES DISCUSSION:   DNR / DNI  Patient's living condition: lives alone    ALLERGIES:Review of patient's allergies indicates no known allergies.  PAST MEDICAL HISTORY:  has a past medical history of Arthritis; Blood " transfusion; Diabetes mellitus (H); Heart disease (2014); Hyperlipidemia; Hypertension; Hypothyroidism (8/21/2014); and Malignant neoplasm (H). He also has no past medical history of Complication of anesthesia; Malignant hyperthermia; or PONV (postoperative nausea and vomiting).  PAST SURGICAL HISTORY:  has a past surgical history that includes colonoscopy (2007); orthopedic surgery; Arthroplasty knee (11/5/2012); biopsy; Abdomen surgery; GI surgery; Soft tissue surgery; Colonoscopy (N/A, 11/15/2016); Phacoemulsification clear cornea with standard intraocular lens implant (Left, 10/25/2017); and Phacoemulsification clear cornea with standard intraocular lens implant (Right, 11/1/2017).  FAMILY HISTORY: family history includes Cardiovascular (age of onset: 81) in his father; Endocrine Disease (age of onset: 74) in his brother.  SOCIAL HISTORY:  reports that he has never smoked. He has never used smokeless tobacco. He reports that he does not drink alcohol or use illicit drugs.    Post Discharge Medication Reconciliation Status: discharge medications reconciled and changed, per note/orders (see AVS).  Current Outpatient Prescriptions   Medication Sig Dispense Refill     nystatin (MYCOSTATIN) 805499 UNIT/ML suspension Take 500,000 Units by mouth 4 times daily       nystatin (MYCOSTATIN) 285616 UNIT/GM POWD Apply topically 4 times daily Also taking prn. Apply topically to groin area for yeast       ACYCLOVIR PO Take 400 mg by mouth 2 times daily Oncology to decide when stop date will be       insulin detemir (LEVEMIR FLEXPEN/FLEXTOUCH) 100 UNIT/ML injection Inject 10 Units Subcutaneous every morning (before breakfast) 15 mL 1     metoprolol succinate (TOPROL-XL) 50 MG 24 hr tablet Take 1 tablet (50 mg) by mouth daily 30 tablet      amLODIPine (NORVASC) 10 MG tablet Take 1 tablet (10 mg) by mouth daily 30 tablet      levofloxacin (LEVAQUIN) 500 MG tablet Take 1 tablet (500 mg) by mouth daily for 7 days 7 tablet 0      INSULIN ASPART SC Inject 9 Units Subcutaneous daily (with dinner)       INSULIN ASPART SC Inject Subcutaneous 4 times daily Sliding scale:  140-175 1 unit  176-210 2 units  211-245 3 units  246-280 4 units  281-315 5 units  316-350 6 units  351-385 7 units  386-420 8 units  >420 9 units       levothyroxine (SYNTHROID/LEVOTHROID) 25 MCG tablet TAKE 1 TABLET BY MOUTH EVERY DAY 90 tablet 0     FREESTYLE LITE test strip USE TO TEST FOUR TIMES DAILY 400 strip 0     INSULIN ASPART SC Inject 13 Units Subcutaneous every morning        INSULIN ASPART SC Inject 11 Units Subcutaneous daily (before lunch)        INSULIN ASPART SC Inject Subcutaneous At Bedtime Inject 4 unit subcutaneously at bedtime for Diabetes II Ok to use Humalog insulin with same order details       VITAMIN D, CHOLECALCIFEROL, PO Take 1,000 Units by mouth       Blood Glucose Monitoring Suppl MISC 3 times daily (before meals)        polyethylene glycol (MIRALAX/GLYCOLAX) Packet Take 17 g by mouth 2 times daily as needed (constipation) 7 packet      LISINOPRIL PO Take 15 mg by mouth daily       furosemide (LASIX) 20 MG tablet Take 1 tablet (20 mg) by mouth daily 90 tablet 1     gemfibrozil (LOPID) 600 MG tablet TAKE 1 TABLET BY MOUTH TWICE DAILY 180 tablet 1     B-D U/F 31G X 8 MM insulin pen needle USE 5 PEN NEEDLES PER DAY OR AS DIRECTED 500 each 1     FREESTYLE LITE test strip USE 1 STRIP TO TEST TWICE DAILY. 200 strip 1     B-D U/F 31G X 8 MM insulin pen needle        Ascorbic Acid (VITAMIN C PO) Take 500 mg by mouth daily        ferrous sulfate (IRON) 325 (65 FE) MG tablet Take 325 mg by mouth daily (with breakfast)       Selenium 200 MCG TABS Take 100 mcg by mouth daily. Pt takes one half tab of the 200 mcg daily        [START ON 3/30/2018] potassium chloride (K-TAB,KLOR-CON) 10 MEQ tablet Take 10 mEq by mouth daily       [DISCONTINUED] cyclophosphamide (CYTOXAN) 50 MG capsule CHEMO Take 6 capsules (300 mg) by mouth once a week 24 capsule 0  "      ROS:  See under HPI above    Exam:  /68  Pulse 74  Temp 96.8  F (36  C)  Resp 24  Ht 5' 8\" (1.727 m)  Wt 203 lb 3.2 oz (92.2 kg)  SpO2 100%  BMI 30.9 kg/m2  GENERAL APPEARANCE:  Alert, in no distress, oriented, cooperative  ENT:  Mouth and posterior oropharynx normal except thrush on tongue, moist mucous membranes  EYES:  EOM, conjunctivae, lids, pupils and irises normal  RESP:  respiratory effort and palpation of chest normal, lungs clear to auscultation , no respiratory distress, diminished breath sounds mildly in right base, no crackles present.  CV:  Palpation and auscultation of heart done , regular rate and rhythm, no murmur, rub, or gallop, no edema, +2 pedal pulses  ABDOMEN:  normal bowel sounds, soft, nontender, no hepatosplenomegaly or other masses  M/S:   Gait and station normal  SKIN:  Inspection of skin and subcutaneous tissue baseline, rash bilat groin--yeast like, pink--no bleeding. Wears Depends.  NEURO:   Cranial nerves 2-12 are normal tested and grossly at patient's baseline  PSYCH:  oriented X 3, normal insight, judgement and memory, affect and mood normal    BP Readings from Last 3 Encounters:   03/29/18 121/68   03/27/18 160/83   03/22/18 149/85     Lab/Diagnostic data:  CBC RESULTS:   Recent Labs   Lab Test  03/27/18   0545  03/26/18   0607   WBC  3.5*  3.7*   RBC  2.88*  2.77*   HGB  10.0*  9.6*   HCT  30.8*  29.7*   MCV  107*  107*   MCH  34.7*  34.7*   MCHC  32.5  32.3   RDW  19.5*  19.8*   PLT  96*  94*       Last Basic Metabolic Panel:  Recent Labs   Lab Test  03/27/18   0545  03/26/18   0607   NA  139  141   POTASSIUM  3.5  3.6   CHLORIDE  106  108   MICHELEL  8.5  8.7   CO2  24  25   BUN  18  22   CR  0.90  0.91   GLC  83  134*       Liver Function Studies -   Recent Labs   Lab Test  03/22/18   1335  03/07/18   1130   PROTTOTAL  7.8  8.4   ALBUMIN  2.7*  3.1*   BILITOTAL  0.3  0.5   ALKPHOS  86  99   AST  16  15   ALT  15  14       TSH   Date Value Ref Range Status "   03/23/2018 1.78 0.40 - 4.00 mU/L Final   09/28/2016 5.53 (H) 0.40 - 5.00 mU/L Final       Lab Results   Component Value Date    A1C 5.5 03/22/2018    A1C 6.3 01/08/2018     ASSESSMENT / PLAN:  (I50.32) Chronic diastolic congestive heart failure (H)  Comment/Plan:  Cont same POC per orders, appears compensated today. F/U PCP and cards per their recs    (J18.9) Pneumonia of both lungs due to infectious organism, unspecified part of lung  Comment: improving  Plan: cont with Levaquin to complete course    (C90.00) Multiple myeloma not having achieved remission (H)   (D70.1,  T45.1X5A) Chemotherapy-induced neutropenia (H)  Comment/Plan: recent chemo, will see oncology next week to review/resume    (I10) Essential hypertension  (I48.0) Paroxysmal atrial fibrillation with RVR (H)  (primary encounter diagnosis)   Comment/Plan: cont same POC, BB increased due to AF with RVR,     (E11.9,  Z79.4) Type 2 diabetes mellitus without complication, with long-term current use of insulin (H)  Comment/Plan: cont same insulins as at home. F/U PCP    (E03.9) Hypothyroidism, unspecified type  Comment/Plan: cont same POC, check TSH as out per PCP    (K21.9) Gastroesophageal reflux disease, esophagitis presence not specified  Comment/Plan: quiescent, monitor, cont POC    (R53.81) Physical deconditioning  Comment: completing and will be done 3/30--no therapies rec'd for home care at this time    Electronically signed by:    DISCHARGE:  PT IS GOING HOME 3/31/18--THIS WAS JUST DECIDED BY THERAPIES, FAMILY AND PT.  I HAVE WRITTEN ORDERS IN CHART.  NO HOME CARE NEEDED. DC MED LIST IS CURRENT.      ALEXANDER Riojas CNP

## 2018-04-03 NOTE — NURSING NOTE
"Chief Complaint   Patient presents with     Hospital F/U       Initial /60 (Cuff Size: Adult Large)  Pulse 77  Temp 97.2  F (36.2  C) (Tympanic)  Resp 16  Wt 200 lb (90.7 kg)  BMI 30.41 kg/m2 Estimated body mass index is 30.41 kg/(m^2) as calculated from the following:    Height as of 3/29/18: 5' 8\" (1.727 m).    Weight as of this encounter: 200 lb (90.7 kg).  Medication Reconciliation: complete     Mariia Allen CMA      "

## 2018-04-03 NOTE — PROGRESS NOTES
SUBJECTIVE:   Roc Parkinson is a 89 year old male who presents to clinic today for the following health issues:          Hospital Follow-up Visit:    Hospital/Nursing Home/IP Rehab Facility: [facility]  Date of Admission: 3/22/18  Date of Discharge: 3/27/18  Reason(s) for Admission: allergic reaction to new chemo treatment and pneumonia            Problems taking medications regularly:  None       Medication changes since discharge: None       Problems adhering to non-medication therapy:  None      Summary of hospitalization:  [facility] discharge summary reviewed  Diagnostic Tests/Treatments reviewed.  Follow up needed: CBC and kidney function  Other Healthcare Providers Involved in Patient s Care:         Specialist appointment - With oncology this week and Imaging will need chest x-ray repeated in about 5 weeks   update since discharge: improved.     Post Discharge Medication Reconciliation: discharge medications reconciled, continue medications without change.  Plan of care communicated with patient     Coding guidelines for this visit:  Type of Medical   Decision Making Face-to-Face Visit       within 7 Days of discharge Face-to-Face Visit        within 14 days of discharge   Moderate Complexity [id number]   High Complexity 64012 [id number]                  Problem list and histories reviewed & adjusted, as indicated.  Additional history: as documented    Patient Active Problem List   Diagnosis     Dysphagia     Malignant neoplasm of prostate (H)     Malignant neoplasm of bladder (H)     Essential hypertension, benign     Asymptomatic varicose veins     Congenital hiatus hernia     Oral lesion     CTS (carpal tunnel syndrome)     Irritable bowel syndrome     Vitamin D deficiency disease     Microalbuminuria     Obesity     UTI (urinary tract infection) w hx of recurrence     Iron deficiency anemia     Nonsmoker     A-fib (H)     Health Care Home     Hyperlipidemia     CKD (chronic  kidney disease) stage 3, GFR 30-59 ml/min     Hypothyroidism     Long-term (current) use of anticoagulants [Z79.01]     Macrocytic anemia     GIB (gastrointestinal bleeding)     Multiple myeloma not having achieved remission (H)     Hypercalcemia     Hyperglycemia     Urinary tract infection     Hyperkalemia     ACP (advance care planning)     MDS (myelodysplastic syndrome) (H)     Chemotherapy-induced neutropenia (H)     Anemia in neoplastic disease     Type 2 diabetes mellitus with stage 3 chronic kidney disease, with long-term current use of insulin (H)     UTI due to extended-spectrum beta lactamase (ESBL) producing Escherichia coli     Essential hypertension     Physical deconditioning     Atrial fibrillation with RVR (H)     Past Surgical History:   Procedure Laterality Date     ARTHROPLASTY KNEE  11/5/2012    Procedure: ARTHROPLASTY KNEE;  RIGHT TOTAL KNEE ARTHROPLASTY (SMITH & NEPHEW)^;  Surgeon: Dickson Schulte MD;  Location:  OR     BIOPSY      bladder     COLONOSCOPY  2007     COLONOSCOPY N/A 11/15/2016    Procedure: COMBINED COLONOSCOPY, SINGLE OR MULTIPLE BIOPSY/POLYPECTOMY BY BIOPSY;  Surgeon: Kenneth Fulton MD;  Location:  GI     GENITOURINARY SURGERY      TURP x2 and bladder scraping     GI SURGERY      anal fistula     ORTHOPEDIC SURGERY      lt knee in nov.2009     PHACOEMULSIFICATION CLEAR CORNEA WITH STANDARD INTRAOCULAR LENS IMPLANT Left 10/25/2017    Procedure: PHACOEMULSIFICATION CLEAR CORNEA WITH STANDARD INTRAOCULAR LENS IMPLANT;  LEFT EYE PHACOEMULSIFICATION CLEAR CORNEA WITH STANDARD INTRAOCULAR LENS IMPLANT ;  Surgeon: Shady Haider MD;  Location: [facility]     PHACOEMULSIFICATION CLEAR CORNEA WITH STANDARD INTRAOCULAR LENS IMPLANT Right 11/1/2017    Procedure: PHACOEMULSIFICATION CLEAR CORNEA WITH STANDARD INTRAOCULAR LENS IMPLANT;  RIGHT EYE PHACOEMULSIFICATION CLEAR CORNEA WITH STANDARD INTRAOCULAR LENS IMPLANT;  Surgeon: Shady Haider MD;  Location: [facility]     SOFT  TISSUE SURGERY      melanoma       Social History   Substance Use Topics     Smoking status: Never Smoker     Smokeless tobacco: Never Used     Alcohol use No     Family History   Problem Relation Age of Onset     Cardiovascular Father 81     heart arrythmia     Endocrine Disease Brother 74     Paget's           Reviewed and updated as needed this visit by clinical staff  Tobacco  Allergies  Meds  Med Hx  Surg Hx  Fam Hx  Soc Hx      Reviewed and updated as needed this visit by Provider         ROS:  Constitutional, HEENT, cardiovascular, pulmonary, gi and gu systems are negative, except as otherwise noted.    OBJECTIVE:                                                    /60 (Cuff Size: Adult Large)  Pulse 77  Temp 97.2  F (36.2  C) (Tympanic)  Resp 16  Wt 200 lb (90.7 kg)  BMI 30.41 kg/m2  Body mass index is 30.41 kg/(m^2).  GENERAL APPEARANCE: healthy, alert and no distress  RESP: lungs clear to auscultation - no rales, rhonchi or wheezes  CV: regular rates and rhythm, normal S1 S2, no S3 or S4 and no murmur, click or rub  LYMPHATICS: no cervical adenopathy         ASSESSMENT/PLAN:                                                        ICD-10-CM    1. CKD (chronic kidney disease) stage 3, GFR 30-59 ml/min N18.3 CBC with platelets differential     Electrolyte panel (Na, K, Cl, CO2, Anion gap)     Creatinine     Urea nitrogen   2. Anemia in neoplastic disease D63.0 CBC with platelets differential   3. Pneumonia of both lower lobes due to infectious organism J18.9    4. Multiple myeloma not having achieved remission (H) C90.00        Patient Instructions   We will check labs as noted.  I suggested that he contact oncology to find out if he needs pretreatment with his dexamethasone before his visit on Thursday.  We will follow-up pending review of tests.  Will need chest x-ray repeated in about 5 weeks.  Further plan will be pending oncology's plan for his multiple myeloma.      Dickson Reich,  MD  [facility]

## 2018-04-03 NOTE — PATIENT INSTRUCTIONS
We will check labs as noted.  I suggested that he contact oncology to find out if he needs pretreatment with his dexamethasone before his visit on Thursday.  We will follow-up pending review of tests.  Will need chest x-ray repeated in about 5 weeks.  Further plan will be pending oncology's plan for his multiple myeloma.

## 2018-04-03 NOTE — MR AVS SNAPSHOT
After Visit Summary   4/3/2018    Roc Parkinson    MRN: [id number]           Patient Information     Date Of Birth          7/7/1926        Visit Information        Provider Department      4/3/2018 8:45 AM Dickson Reich MD [facility]        Today's Diagnoses     CKD (chronic kidney disease) stage 3, GFR 30-59 ml/min    -  1    Anemia in neoplastic disease           Follow-ups after your visit        Your next 10 appointments already scheduled     Apr 05, 2018  7:30 AM CDT   Level 6 with SH INFUSION CHAIR 1   [facility] ([facility])    [facility]  [address]  [place] [address]   [phone]            Apr 05, 2018  8:00 AM CDT   Return Visit with Gretel Quinn MD   [facility] ([facility])    [facility]  [address]  [place] [address]   [phone]            Apr 11, 2018  8:00 AM CDT   Level 6 with SH INFUSION CHAIR 6   [facility] ([facility]    [facility]  [address]  [place] [address]   [phone]            Apr 18, 2018  8:00 AM CDT   Level 6 with SH INFUSION CHAIR 4   [facility] ([facility]    [facility]  [address]  [place] [address]   [phone]            Apr 25, 2018  8:00 AM CDT   Level 6 with SH INFUSION CHAIR 6   [facility] ([facility]    [facility]  [address]  [place] [address]   [phone]            Apr 25, 2018  8:45 AM CDT   Return Visit with Gretel Quinn MD   [facility] ([facility]    [facility]  [address]  [place] [address]   [phone]            May 09,  "2018  8:00 AM CDT   Level 6 with SH INFUSION CHAIR 5   [facility] ([facility])    [facility] [place]  [place] [address]   [phone]            May 16, 2018  8:00 AM CDT   Level 6 with SH INFUSION CHAIR 6   [facility] ([facility])    [facility] [address]  [place] [address]   [phone]            Jun 06, 2018  1:00 PM CDT   Level 1 with SH INFUSION CHAIR 13   [facility] ([facility])    [facility] [place]  [address]  [place] [address]   [phone]              Who to contact     If you have questions or need follow up information about today's clinic visit or your schedule please contact [facility] directly at [phone].  Normal or non-critical lab and imaging results will be communicated to you by [employer], letter or phone within 4 business days after the clinic has received the results. If you do not hear from us within 7 days, please contact the clinic through Dmailert or phone. If you have a critical or abnormal lab result, we will notify you by phone as soon as possible.  Submit refill requests through [employer]urb[employer] or call your pharmacy and they will forward the refill request to us. Please allow 3 business days for your refill to be completed.          Additional Information About Your Visit        Turbocoating Information     T[employer]ating lets you send messages to your doctor, view your test results, renew your prescriptions, schedule appointments and more. To sign up, go to [email or web address] . Click on \"Log in\" on the left side of the screen, which will take you to the Welcome page. Then click on \"Sign up Now\" on the right side of the page.     You will be asked to enter the access code listed below, as well as some personal " information. Please follow the directions to create your username and password.     Your access code is: [email or web address]  Expires: 2018 11:49 AM     Your access code will  in 90 days. If you need help or a new code, please call your [place] clinic or [phone].        Care EveryWhere ID     This is your Care EveryWhere ID. This could be used by other organizations to access your [place] medical records  [id number]        Your Vitals Were     Pulse Temperature Respirations BMI (Body Mass Index)          77 97.2  F (36.2  C) (Tympanic) 16 30.41 kg/m2         Blood Pressure from Last 3 Encounters:   18 120/60   18 121/68   18 160/83    Weight from Last 3 Encounters:   18 200 lb (90.7 kg)   18 203 lb 3.2 oz (92.2 kg)   18 199 lb (90.3 kg)              We Performed the Following     CBC with platelets differential     Creatinine     Electrolyte panel (Na, K, Cl, CO2, Anion gap)     Urea nitrogen          Today's Medication Changes          These changes are accurate as of 4/3/18  9:14 AM.  If you have any questions, ask your nurse or doctor.               Stop taking these medicines if you haven't already. Please contact your care team if you have questions.     levofloxacin 500 MG tablet   Commonly known as:  LEVAQUIN   Stopped by:  Dickson Reich MD                    Primary Care Provider Office Phone # Fax #    Dickson Reich [phone] [address] [facility] [address]        Equal Access to Services     : Hadii virginia wilson hadasho Soaimee, waaxda luqadaha, qaybta kaalmada melita thomason. So [facility] [phone].    ATENCIÓN: Si habla español, tiene a nolan disposición servicios gratuitos de asistencia lingüística. Llame al [phone].    We comply with applicable federal civil rights laws and [place] laws. We do not discriminate on the basis of race, color, national origin,  age, disability, sex, sexual orientation, or gender identity.            Thank you!     Thank you for choosing [facility]  for your care. Our goal is always to provide you with excellent care. Hearing back from our patients is one way we can continue to improve our services. Please take a few minutes to complete the written survey that you may receive in the mail after your visit with us. Thank you!             Your Updated Medication List - Protect others around you: Learn how to safely use, store and throw away your medicines at www.disposemymeds.org.          This list is accurate as of 4/3/18  9:14 AM.  Always use your most recent med list.                   Brand Name Dispense Instructions for use Diagnosis    ACYCLOVIR PO      Take 400 mg by mouth 2 times daily Oncology to decide when stop date will be        amLODIPine 10 MG tablet    NORVASC    30 tablet    Take 1 tablet (10 mg) by mouth daily    Essential hypertension       * B-D U/F 31G X 8 MM   Generic drug:  insulin pen needle           * B-D U/F 31G X 8 MM   Generic drug:  insulin pen needle     500 each    USE 5 PEN NEEDLES PER DAY OR AS DIRECTED    Type 2 diabetes mellitus with stage 3 chronic kidney disease, with long-term current use of insulin (H)       Blood Glucose Monitoring Suppl Misc      3 times daily (before meals)        ferrous sulfate 325 (65 FE) MG tablet    IRON     Take 325 mg by mouth daily (with breakfast)        * FREESTYLE LITE test strip   Generic drug:  blood glucose monitoring     200 strip    USE 1 STRIP TO TEST TWICE DAILY.    Uncontrolled type 1 diabetes mellitus with stage 3 chronic kidney disease (H)       * FREESTYLE LITE test strip   Generic drug:  blood glucose monitoring     400 strip    USE TO TEST FOUR TIMES DAILY    Uncontrolled type 1 diabetes mellitus with stage 3 chronic kidney disease (H)       furosemide 20 MG tablet    LASIX    90 tablet    Take 1 tablet (20 mg) by mouth daily     Multiple myeloma not having achieved remission (H)       gemfibrozil 600 MG tablet    LOPID    180 tablet    TAKE 1 TABLET BY MOUTH TWICE DAILY    Hyperlipidemia       * INSULIN ASPART SC      Inject 13 Units Subcutaneous every morning        * INSULIN ASPART SC      Inject 11 Units Subcutaneous daily (before lunch)        * INSULIN ASPART SC      Inject Subcutaneous At Bedtime Inject 4 unit subcutaneously at bedtime for Diabetes II Ok to use Humalog insulin with same order details        * INSULIN ASPART SC      Inject 9 Units Subcutaneous daily (with dinner)        * INSULIN ASPART SC      Inject Subcutaneous 4 times daily Sliding scale: 140-175 1 unit 176-210 2 units 211-245 3 units 246-280 4 units 281-315 5 units 316-350 6 units 351-385 7 units 386-420 8 units >420 9 units        insulin detemir 100 UNIT/ML injection    LEVEMIR FLEXPEN/FLEXTOUCH    15 mL    Inject 10 Units Subcutaneous every morning (before breakfast)    Type 2 diabetes mellitus with diabetic chronic kidney disease, unspecified CKD stage, unspecified long term insulin use status (H), Uncontrolled type 2 diabetes mellitus with hyperglycemia, unspecified long term insulin use status (H), Type 2 diabetes mellitus with stage 3 chronic kidney disease, with long-term current use of insulin (H)       levothyroxine 25 MCG tablet    SYNTHROID/LEVOTHROID    90 tablet    TAKE 1 TABLET BY MOUTH EVERY DAY    Acquired hypothyroidism       LISINOPRIL PO      Take 15 mg by mouth daily        metoprolol succinate 50 MG 24 hr tablet    TOPROL-XL    30 tablet    Take 1 tablet (50 mg) by mouth daily    Atrial fibrillation with rapid ventricular response (H)       nystatin 232601 UNIT/GM Powd    MYCOSTATIN     Apply topically 4 times daily Also taking prn. Apply topically to groin area for yeast        nystatin 299356 UNIT/ML suspension    MYCOSTATIN     Take 500,000 Units by mouth 4 times daily        polyethylene glycol Packet    MIRALAX/GLYCOLAX    7 packet     Take 17 g by mouth 2 times daily as needed (constipation)    Slow transit constipation       potassium chloride 10 MEQ tablet    K-TAB,KLOR-CON     Take 10 mEq by mouth daily        Selenium 200 MCG Tabs      Take 100 mcg by mouth daily. Pt takes one half tab of the 200 mcg daily        VITAMIN C PO      Take 500 mg by mouth daily        VITAMIN D (CHOLECALCIFEROL) PO      Take 1,000 Units by mouth        * Notice:  This list has 9 medication(s) that are the same as other medications prescribed for you. Read the directions carefully, and ask your doctor or other care provider to review them with you.

## 2018-04-03 NOTE — LETTER
April 10, 2018      Roc SILVA Tesha  [address]   [facility] [address]        Dear ,    We are writing to inform you of your test results.    This is all pretty stable. I should see you back in one month for follow up.    Sincerely,      Dickson Reich M.D.    Resulted Orders   CBC with platelets differential   Result Value Ref Range    WBC 4.9 4.0 - 11.0 10e9/L    RBC Count 2.90 (L) 4.4 - 5.9 10e12/L    Hemoglobin 10.4 (L) 13.3 - 17.7 g/dL    Hematocrit 32.1 (L) 40.0 - 53.0 %     (H) 78 - 100 fl    MCH 35.9 (H) 26.5 - 33.0 pg    MCHC 32.4 31.5 - 36.5 g/dL    RDW 19.7 (H) 10.0 - 15.0 %    Platelet Count 119 (L) 150 - 450 10e9/L    Diff Method Automated Method     % Neutrophils 74.2 %    % Lymphocytes 16.6 %    % Monocytes 8.2 %    % Eosinophils 0.8 %    % Basophils 0.2 %    Absolute Neutrophil 3.6 1.6 - 8.3 10e9/L    Absolute Lymphocytes 0.8 0.8 - 5.3 10e9/L    Absolute Monocytes 0.4 0.0 - 1.3 10e9/L    Absolute Eosinophils 0.0 0.0 - 0.7 10e9/L    Absolute Basophils 0.0 0.0 - 0.2 10e9/L   Electrolyte panel (Na, K, Cl, CO2, Anion gap)   Result Value Ref Range    Sodium 135 133 - 144 mmol/L    Potassium 4.7 3.4 - 5.3 mmol/L    Chloride 101 94 - 109 mmol/L    Carbon Dioxide 26 20 - 32 mmol/L    Anion Gap 8 3 - 14 mmol/L   Creatinine   Result Value Ref Range    Creatinine 1.18 0.66 - 1.25 mg/dL    GFR Estimate 58 (L) >60 mL/min/1.7m2      Comment:      Non  GFR Calc    GFR Estimate If Black 70 >60 mL/min/1.7m2      Comment:       GFR Calc   Urea nitrogen   Result Value Ref Range    Urea Nitrogen 33 (H) 7 - 30 mg/dL       If you have any questions or concerns, please call the clinic at the number listed above.       Sincerely,        Dickson Reich MD

## 2018-04-05 NOTE — MR AVS SNAPSHOT
After Visit Summary   4/5/2018    Roc Parkinson    MRN: [id number]           Patient Information     Date Of Birth          7/7/1926        Visit Information        Provider Department      4/5/2018 8:00 AM Gretel Quinn MD [facility]        Today's Diagnoses     Multiple myeloma not having achieved remission (H)    -  1      Care Instructions    1. NP visit next week. Start weekly Velcade/dexamethasone, next week- chemo education today.   2. Zometa every 12 weeks  3. Continue Aranesp 300 mcg SQ every three weeks  4. Normal saline 1 liter IV today.  5. Labs today- CBC diff, CMP  6. RTC MD 4/19          Follow-ups after your visit        Your next 10 appointments already scheduled     Apr 11, 2018  8:00 AM CDT   Level 6 with SH INFUSION CHAIR 6   [facility] ([facility])    [facility] [place] [address]  [place] [address]   [phone]            Apr 11, 2018  9:00 AM CDT   Return Visit with Gretel Quinn MD   [facility] ([facility])    [facility]  [address]  [place] [address]   [phone]            Apr 18, 2018  9:00 AM CDT   Level 6 with SH INFUSION CHAIR 14   [facility] ([facility])    [facility]  [address]  [place] [address]   [phone]            Apr 18, 2018 10:45 AM CDT   Return Visit with Gretel Quinn MD   [facility] ([facility])    [facility]  [address]  [place] [address]   [phone]            Apr 25, 2018  8:00 AM CDT   Level 6 with SH INFUSION CHAIR 6   [facility])    [facility]  [address]  [place] [address]   [phone]            May 09, 2018  8:00 AM CDT   Level 6 with  " INFUSION CHAIR 5   [facility] ([facility])    [facility] [place]  [address]  [place] [address] [phone]            May 16, 2018  8:00 AM CDT   Level 6 with  INFUSION CHAIR 6   [facility] ([facility])    [facility] [place]  [address]  [place] [address] [phone]              Who to contact     If you have questions or need follow up information about today's clinic visit or your schedule please contact [facility] directly at [phone].  Normal or non-critical lab and imaging results will be communicated to you by FOOD[employer]art, letter or phone within 4 business days after the clinic has received the results. If you do not hear from us within 7 days, please contact the clinic through [employer] or phone. If you have a critical or abnormal lab result, we will notify you by phone as soon as possible.  Submit refill requests through [employer] or call your pharmacy and they will forward the refill request to us. Please allow 3 business days for your refill to be completed.          Additional Information About Your Visit        Hit[employer] Information     [employer] lets you send messages to your doctor, view your test results, renew your prescriptions, schedule appointments and more. To sign up, go to [email or web address] . Click on \"Log in\" on the left side of the screen, which will take you to the Welcome page. Then click on \"Sign up Now\" on the right side of the page.     You will be asked to enter the access code listed below, as well as some personal information. Please follow the directions to create your username and password.     Your access code is: [email or web address]  Expires: 2018 11:49 AM     Your access code will  in 90 days. If you need help or a new code, please call your [place] clinic or [phone].        Care EveryWhere " ID     This is your Care EveryWhere ID. This could be used by other organizations to access your [place] medical records  [id number]        Your Vitals Were     Pulse Temperature Respirations Pulse Oximetry BMI (Body Mass Index)       78 98.4  F (36.9  C) (Oral) 16 94% 30.56 kg/m2        Blood Pressure from Last 3 Encounters:   04/05/18 137/66   04/05/18 100/52   04/03/18 120/60    Weight from Last 3 Encounters:   04/05/18 91.2 kg (201 lb)   04/03/18 90.7 kg (200 lb)   03/29/18 92.2 kg (203 lb 3.2 oz)              We Performed the Following     CBC with platelets differential     Comprehensive metabolic panel        Primary Care Provider Office Phone # Fax #    Dickson Reich [phone] [address] [facility] [address]        Equal Access to Services     [facility] Center[facility] : Hadii aad ku hadasho Soomaali, waaxda luqadaha, qaybta kaalmada adeegyada, waxay carmelin haysbn constantine shaw . So [facility] [phone].    ATENCIÓN: Si habla español, tiene a nolan disposición servicios gratuitos de asistencia lingüística. Eric al [phone].    We comply with applicable federal civil rights laws and [place] laws. We do not discriminate on the basis of race, color, national origin, age, disability, sex, sexual orientation, or gender identity.            Thank you!     Thank you for choosing [facility]  for your care. Our goal is always to provide you with excellent care. Hearing back from our patients is one way we can continue to improve our services. Please take a few minutes to complete the written survey that you may receive in the mail after your visit with us. Thank you!             Your Updated Medication List - Protect others around you: Learn how to safely use, store and throw away your medicines at www.disposemymeds.org.          This list is accurate as of 4/5/18  9:26 AM.  Always use your most recent med list.                   Brand Name Dispense Instructions  for use Diagnosis    ACYCLOVIR PO      Take 400 mg by mouth 2 times daily Oncology to decide when stop date will be        amLODIPine 10 MG tablet    NORVASC    30 tablet    Take 1 tablet (10 mg) by mouth daily    Essential hypertension       * B-D U/F 31G X 8 MM   Generic drug:  insulin pen needle           * B-D U/F 31G X 8 MM   Generic drug:  insulin pen needle     500 each    USE 5 PEN NEEDLES PER DAY OR AS DIRECTED    Type 2 diabetes mellitus with stage 3 chronic kidney disease, with long-term current use of insulin (H)       Blood Glucose Monitoring Suppl Misc      3 times daily (before meals)        ferrous sulfate 325 (65 FE) MG tablet    IRON     Take 325 mg by mouth daily (with breakfast)        * FREESTYLE LITE test strip   Generic drug:  blood glucose monitoring     200 strip    USE 1 STRIP TO TEST TWICE DAILY.    Uncontrolled type 1 diabetes mellitus with stage 3 chronic kidney disease (H)       * FREESTYLE LITE test strip   Generic drug:  blood glucose monitoring     400 strip    USE TO TEST FOUR TIMES DAILY    Uncontrolled type 1 diabetes mellitus with stage 3 chronic kidney disease (H)       furosemide 20 MG tablet    LASIX    90 tablet    Take 1 tablet (20 mg) by mouth daily    Multiple myeloma not having achieved remission (H)       gemfibrozil 600 MG tablet    LOPID    180 tablet    TAKE 1 TABLET BY MOUTH TWICE DAILY    Hyperlipidemia       * INSULIN ASPART SC      Inject 13 Units Subcutaneous every morning        * INSULIN ASPART SC      Inject 11 Units Subcutaneous daily (before lunch)        * INSULIN ASPART SC      Inject Subcutaneous At Bedtime Inject 4 unit subcutaneously at bedtime for Diabetes II Ok to use Humalog insulin with same order details        * INSULIN ASPART SC      Inject 9 Units Subcutaneous daily (with dinner)        * INSULIN ASPART SC      Inject Subcutaneous 4 times daily Sliding scale: 140-175 1 unit 176-210 2 units 211-245 3 units 246-280 4 units 281-315 5 units 316-350  6 units 351-385 7 units 386-420 8 units >420 9 units        insulin detemir 100 UNIT/ML injection    LEVEMIR FLEXPEN/FLEXTOUCH    15 mL    Inject 10 Units Subcutaneous every morning (before breakfast)    Type 2 diabetes mellitus with diabetic chronic kidney disease, unspecified CKD stage, unspecified long term insulin use status (H), Uncontrolled type 2 diabetes mellitus with hyperglycemia, unspecified long term insulin use status (H), Type 2 diabetes mellitus with stage 3 chronic kidney disease, with long-term current use of insulin (H)       levothyroxine 25 MCG tablet    SYNTHROID/LEVOTHROID    90 tablet    TAKE 1 TABLET BY MOUTH EVERY DAY    Acquired hypothyroidism       LISINOPRIL PO      Take 15 mg by mouth daily        metoprolol succinate 50 MG 24 hr tablet    TOPROL-XL    30 tablet    Take 1 tablet (50 mg) by mouth daily    Atrial fibrillation with rapid ventricular response (H)       nystatin 004823 UNIT/GM Powd    MYCOSTATIN     Apply topically 4 times daily Also taking prn. Apply topically to groin area for yeast        nystatin [id number] UNIT/ML suspension    MYCOSTATIN     Take 500,000 Units by mouth 4 times daily        polyethylene glycol Packet    MIRALAX/GLYCOLAX    7 packet    Take 17 g by mouth 2 times daily as needed (constipation)    Slow transit constipation       potassium chloride 10 MEQ tablet    K-TAB,KLOR-CON     Take 10 mEq by mouth daily        Selenium 200 MCG Tabs      Take 100 mcg by mouth daily. Pt takes one half tab of the 200 mcg daily        VITAMIN C PO      Take 500 mg by mouth daily        VITAMIN D (CHOLECALCIFEROL) PO      Take 1,000 Units by mouth        * Notice:  This list has 9 medication(s) that are the same as other medications prescribed for you. Read the directions carefully, and ask your doctor or other care provider to review them with you.

## 2018-04-05 NOTE — PROGRESS NOTES
"Oncology Rooming Note    April 5, 2018 8:04 AM   Roc Parkinson is a 89 year old male who presents for:    Chief Complaint   Patient presents with     Oncology Clinic Visit     MDS (myelodysplastic syndrome) (H)     Initial Vitals: /52 (BP Location: Left arm, Patient Position: Sitting, Cuff Size: Adult Large)  Pulse 78  Temp 98.4  F (36.9  C) (Oral)  Resp 16  Wt 91.2 kg (201 lb)  SpO2 94%  BMI 30.56 kg/m2 Estimated body mass index is 30.56 kg/(m^2) as calculated from the following:    Height as of 3/29/18: 1.727 m (5' 8\").    Weight as of this encounter: 91.2 kg (201 lb). Body surface area is 2.09 meters squared.  Mild Pain (2) Comment: Data Unavailable   No LMP for male patient.  Allergies reviewed: Yes  Medications reviewed: Yes    Medications: Medication refills not needed today.  Pharmacy name entered into [employer]:    [facility] [place], [place] - [address] [address] [place] [address] [address] [facility]    Clinical concerns: None             4 minutes for nursing intake (face to face time)     Kiya Drake MA          Medical Assistant Note:  Roc Parkinson presents today for labs.    Patient seen by provider today: Yes.   present during visit today: Not Applicable.    Concerns: No Concerns.    Procedure:  Lab draw site: Right hand, Needle type: BF, Gauge: 21.    Post Assessment:  Labs drawn without difficulty: Yes.    Discharge Plan:  Departure Mode: Ambulatory.    Face to Face Time: 5 minutes.    Diana Mosley MA            "

## 2018-04-05 NOTE — PROGRESS NOTES
[facility] PHYSICIANS  HEMATOLOGY ONCOLOGY     DIAGNOSIS:    1- Kappa light chain IgM multiple myeloma in a 89-year-old patient.  Bone marrow biopsy on 11/17/2016 showed hypercellular bone marrow with 65% cellularity of light chain restricted plasma cells.  FISH or G-banding could not be performed due to insufficient sample.   There is a background of myelodysplastic syndrome.  The patient was initially seen in the hospital on 11/17/2016 for macrocytic anemia and pancytopenia and transfusion requirement and a bone marrow biopsy was performed.   2- History of prostate cancer s/p EBRT s/p brachytherapy in 2003 (recent PSA 0.10), superficial bladder cancer ,no recurrences since 1986     TREATMENT: 12/15/16 velcade/dex. 4/12/17 added cyclophosphamide 300 mg weekly.6/28/17 we discontinued velcade due to concern for neuropathy and continued with weekly cyclophosphamide and dexamethasone.   1/2/18 switched to Carfilzomib and dexamethasone.   3/14/18 started on Daratumumab and dexamethasone.      SUBJECTIVE:  The patient was seen as a followup today. He is not drinking water enough. He is tired. He is slowly regaining his physical strength.     REVIEW OF SYSTEMS:  A complete review of systems was performed and found to be negative other than pertinent positives mentioned in history of present illness.     Past medical, social histories reviewed.    Meds- Reviewed.     PHYSICAL EXAMINATION:   VITAL SIGNS:/52 (BP Location: Left arm, Patient Position: Sitting, Cuff Size: Adult Large)  Pulse 78  Temp 98.4  F (36.9  C) (Oral)  Resp 16  Wt 91.2 kg (201 lb)  SpO2 94%  BMI 30.56 kg/m2  CONSTITUTIONAL: Appears tired.   HEENT: Pupils are equal. Oropharynx is clear.   NECK: No cervical or supraclavicular lymphadenopathy.   RESPIRATORY: Clear bilaterally.   CARD/VASC: S1, S2, regular.   GI: Soft, nontender, nondistended, no hepatosplenomegaly.   MUSKULOSKELETAL: Warm, well perfused.   NEUROLOGIC: Alert, awake.    INTEGUMENT: No rash.   LYMPHATICS: No edema.   PSYCH: Mood and affect was normal.     LABORATORY DATA AND IMAGING REVIEWED DURING THIS VISIT:  Recent Labs   Lab Test  04/03/18   0918  03/27/18   0545  03/26/18   0607  03/23/18   0532   01/21/18   0834  01/20/18   0220   NA  135  139  141  135   < >  140  139   POTASSIUM  4.7  3.5  3.6  4.2   < >  4.1  4.1   CHLORIDE  101  106  108  106   < >  108  107   CO2  26  24  25  20   < >  22  22   ANIONGAP  8  9  8  9   < >  10  10   BUN  33*  18  22  36*   < >  29  45*   CR  1.18  0.90  0.91  1.06   < >  1.02  1.45*   GLC   --   83  134*  446*   < >  183*  75   MICHELLE   --   8.5  8.7  8.0*   < >  8.8  9.1   MAG   --    --   2.0  1.8   < >   --   2.2   PHOS   --    --    --    --    --   2.9  2.2*    < > = values in this interval not displayed.     Recent Labs   Lab Test  04/03/18   0918  03/27/18   0545  03/26/18   0607   WBC  4.9  3.5*  3.7*   HGB  10.4*  10.0*  9.6*   PLT  119*  96*  94*   MCV  111*  107*  107*   NEUTROPHIL  74.2  72.9  77.4     Recent Labs   Lab Test  03/22/18   1335  03/07/18   1130  02/07/18   1100   11/17/16   0938   BILITOTAL  0.3  0.5  0.4   < >   --    ALKPHOS  86  99  99   < >   --    ALT  15  14  14   < >   --    AST  16  15  14   < >   --    ALBUMIN  2.7*  3.1*  2.7*   < >   --    LDH   --    --    --    --   110    < > = values in this interval not displayed.         Results for orders placed or performed during the hospital encounter of 03/22/18   XR Chest 2 Views    Narrative    CHEST TWO VIEWS   3/25/2018 12:21 PM     HISTORY: Acute respiratory failure with right basal crackles, rule out  aspiration pneumonia.     COMPARISON: 1/20/2018.      Impression    IMPRESSION: New right basilar consolidation with right pleural  effusion concerning for pneumonia. There is also small left pleural  effusion and mild left basilar opacity. No pneumothorax visualized.  Cardiac silhouette is unchanged. There are atherosclerotic  calcifications of the aortic  arch.    ANGELIQUE CANCINO MD     ECOG PS: 1    ASSESSMENT:  This is a 89-year-old gentleman who has kappa light chain multiple myeloma with hypercellular marrow with 65% of cells are light chain restricted plasma cells.  He had severe pancytopenia and has needed a transfusion in the hospital.  He did not have hypercalcemia and his renal function was normal. He has a background of myelodysplastic syndrome on his bone marrow biopsy; however, majority of the cell population was monoclonal plasma cell population.   He was started on treatment due to cytopenia.   In 4/2017 his light chain levels were getting worse. M spike was stable. We added cyclophosphamide to the regimen. In 6/2017 discontinued velcade for concern of development of neuropathy, in hindsight the pain appeared to be related to arthritis.     He is on Aranesp 300 mcg SQ every three weeks.  He was switched to Daratumumab and Dex on 3/14/18. The first dose resulted in reaction, he was admitted to the hospital with A fib and had complications/pneumonia.     - Labs were reviewed with the patient, macrocytic anemia and thrombocytopenia appears to have some improvement.   - We had a detailed discussion about next steps. He wants to continue treatment. His son Alfredo was also on the phone during the discussion.   I would prefer to start him on Velcade and dexamethasone at this point. We had a detailed discussion about each chemotherapy agent, intent of treatment and also potential risks and benefits. We discussed the adverse effects related to each of chemotherapy agent. Velcade is given Subcutaneous.   Patient asked questions and expressed willingness to proceed with the treatment.   - he is deconditioned and dehydrated. He needs some time to recover before he starts his treatment. He will also get normal saline today due to dehydration.     PLAN:   1. NP visit next week. Start weekly Velcade/dexamethasone, next week- chemo education today.   2. Zometa every 12  weeks  3. Continue Aranesp 300 mcg SQ every three weeks  4. Normal saline 1 liter IV today.  5. Labs today- CBC diff, CMP  6. RTC MD 4/19    ALISON JON MD    4/5/2018

## 2018-04-05 NOTE — Clinical Note
4/5/2018         RE: Roc Parkinson  [address]   [facility] [address]        Dear Colleague,    Thank you for referring your patient, Roc Parkinson, to the [facility]. Please see a copy of my visit note below.    [facility] PHYSICIANS  HEMATOLOGY ONCOLOGY     DIAGNOSIS:    1- Kappa light chain IgM multiple myeloma in a 89-year-old patient.  Bone marrow biopsy on 11/17/2016 showed hypercellular bone marrow with 65% cellularity of light chain restricted plasma cells.  FISH or G-banding could not be performed due to insufficient sample.   There is a background of myelodysplastic syndrome.  The patient was initially seen in the hospital on 11/17/2016 for macrocytic anemia and pancytopenia and transfusion requirement and a bone marrow biopsy was performed.   2- History of prostate cancer s/p EBRT s/p brachytherapy in 2003 (recent PSA 0.10), superficial bladder cancer ,no recurrences since 1986     TREATMENT: 12/15/16 velcade/dex. 4/12/17 added cyclophosphamide 300 mg weekly.6/28/17 we discontinued velcade due to concern for neuropathy and continued with weekly cyclophosphamide and dexamethasone.   1/2/18 switched to Carfilzomib and dexamethasone.   3/14/18 started on Daratumumab and dexamethasone.      SUBJECTIVE:  The patient was seen as a followup today. He is not drinking water enough. He is tired. He is slowly regaining his physical strength.     REVIEW OF SYSTEMS:  A complete review of systems was performed and found to be negative other than pertinent positives mentioned in history of present illness.     Past medical, social histories reviewed.    Meds- Reviewed.     PHYSICAL EXAMINATION:   VITAL SIGNS:/52 (BP Location: Left arm, Patient Position: Sitting, Cuff Size: Adult Large)  Pulse 78  Temp 98.4  F (36.9  C) (Oral)  Resp 16  Wt 91.2 kg (201 lb)  SpO2 94%  BMI 30.56 kg/m2  CONSTITUTIONAL: Appears tired.   HEENT: Pupils are equal. Oropharynx  is clear.   NECK: No cervical or supraclavicular lymphadenopathy.   RESPIRATORY: Clear bilaterally.   CARD/VASC: S1, S2, regular.   GI: Soft, nontender, nondistended, no hepatosplenomegaly.   MUSKULOSKELETAL: Warm, well perfused.   NEUROLOGIC: Alert, awake.   INTEGUMENT: No rash.   LYMPHATICS: No edema.   PSYCH: Mood and affect was normal.     LABORATORY DATA AND IMAGING REVIEWED DURING THIS VISIT:  Recent Labs   Lab Test  04/03/18   0918  03/27/18   0545  03/26/18   0607  03/23/18   0532   01/21/18   0834  01/20/18   0220   NA  135  139  141  135   < >  140  139   POTASSIUM  4.7  3.5  3.6  4.2   < >  4.1  4.1   CHLORIDE  101  106  108  106   < >  108  107   CO2  26  24  25  20   < >  22  22   ANIONGAP  8  9  8  9   < >  10  10   BUN  33*  18  22  36*   < >  29  45*   CR  1.18  0.90  0.91  1.06   < >  1.02  1.45*   GLC   --   83  134*  446*   < >  183*  75   MICHELLE   --   8.5  8.7  8.0*   < >  8.8  9.1   MAG   --    --   2.0  1.8   < >   --   2.2   PHOS   --    --    --    --    --   2.9  2.2*    < > = values in this interval not displayed.     Recent Labs   Lab Test  04/03/18   0918  03/27/18   0545  03/26/18   0607   WBC  4.9  3.5*  3.7*   HGB  10.4*  10.0*  9.6*   PLT  119*  96*  94*   MCV  111*  107*  107*   NEUTROPHIL  74.2  72.9  77.4     Recent Labs   Lab Test  03/22/18   1335  03/07/18   1130  02/07/18   1100   11/17/16   0938   BILITOTAL  0.3  0.5  0.4   < >   --    ALKPHOS  86  99  99   < >   --    ALT  15  14  14   < >   --    AST  16  15  14   < >   --    ALBUMIN  2.7*  3.1*  2.7*   < >   --    LDH   --    --    --    --   110    < > = values in this interval not displayed.         Results for orders placed or performed during the hospital encounter of 03/22/18   XR Chest 2 Views    Narrative    CHEST TWO VIEWS   3/25/2018 12:21 PM     HISTORY: Acute respiratory failure with right basal crackles, rule out  aspiration pneumonia.     COMPARISON: 1/20/2018.      Impression    IMPRESSION: New right basilar  consolidation with right pleural  effusion concerning for pneumonia. There is also small left pleural  effusion and mild left basilar opacity. No pneumothorax visualized.  Cardiac silhouette is unchanged. There are atherosclerotic  calcifications of the aortic arch.    ANGELIQUE CANCINO MD     ECOG PS: 1    ASSESSMENT:  This is a 89-year-old gentleman who has kappa light chain multiple myeloma with hypercellular marrow with 65% of cells are light chain restricted plasma cells.  He had severe pancytopenia and has needed a transfusion in the hospital.  He did not have hypercalcemia and his renal function was normal. He has a background of myelodysplastic syndrome on his bone marrow biopsy; however, majority of the cell population was monoclonal plasma cell population.   He was started on treatment due to cytopenia.   In 4/2017 his light chain levels were getting worse. M spike was stable. We added cyclophosphamide to the regimen. In 6/2017 discontinued velcade for concern of development of neuropathy, in hindsight the pain appeared to be related to arthritis.     He is on Aranesp 300 mcg SQ every three weeks.  He was switched to Daratumumab and Dex on 3/14/18. The first dose resulted in reaction, he was admitted to the hospital with A fib and had complications/pneumonia.     - Labs were reviewed with the patient, macrocytic anemia and thrombocytopenia appears to have some improvement.   - We had a detailed discussion about next steps. He wants to continue treatment. His son Alfredo was also on the phone during the discussion.   I would prefer to start him on Velcade and dexamethasone at this point. We had a detailed discussion about each chemotherapy agent, intent of treatment and also potential risks and benefits. We discussed the adverse effects related to each of chemotherapy agent. Velcade is given Subcutaneous.   Patient asked questions and expressed willingness to proceed with the treatment.   - he is deconditioned  "and dehydrated. He needs some time to recover before he starts his treatment. He will also get normal saline today due to dehydration.     PLAN:   1. NP visit next week. Start weekly Velcade/dexamethasone, next week- chemo education today.   2. Zometa every 12 weeks  3. Continue Aranesp 300 mcg SQ every three weeks  4. Normal saline 1 liter IV today.  5. Labs today- CBC diff, CMP  6. RTC MD 4/19    ALISON JON MD    4/5/2018        Oncology Rooming Note    April 5, 2018 8:04 AM   Roc Parkinson is a 89 year old male who presents for:    Chief Complaint   Patient presents with     Oncology Clinic Visit     MDS (myelodysplastic syndrome) (H)     Initial Vitals: /52 (BP Location: Left arm, Patient Position: Sitting, Cuff Size: Adult Large)  Pulse 78  Temp 98.4  F (36.9  C) (Oral)  Resp 16  Wt 91.2 kg (201 lb)  SpO2 94%  BMI 30.56 kg/m2 Estimated body mass index is 30.56 kg/(m^2) as calculated from the following:    Height as of 3/29/18: 1.727 m (5' 8\").    Weight as of this encounter: 91.2 kg (201 lb). Body surface area is 2.09 meters squared.  Mild Pain (2) Comment: Data Unavailable   No LMP for male patient.  Allergies reviewed: Yes  Medications reviewed: Yes    Medications: Medication refills not needed today.  Pharmacy name entered into [employer]:    [facility] [address] DR[facility] STORE [address] [place] [address] [address] AT [address]    Clinical concerns: None             4 minutes for nursing intake (face to face time)     Kiya Drake MA          Medical Assistant Note:  Roc Parkinson presents today for labs.    Patient seen by provider today: Yes.   present during visit today: Not Applicable.    Concerns: No Concerns.    Procedure:  Lab draw site: Right hand, Needle type: BF, Gauge: 21.    Post Assessment:  Labs drawn without difficulty: Yes.    Discharge Plan:  Departure Mode: Ambulatory.    Face to Face Time: 5 " minutes.    Diana Mosley MA              Again, thank you for allowing me to participate in the care of your patient.        Sincerely,        Gretel Quinn MD

## 2018-04-05 NOTE — PROGRESS NOTES
Infusion Nursing Note:  Roc Parkinson presents today for liter IV fluids.    Patient seen by provider today: Yes:    present during visit today: Not Applicable.    Note: Seen in clinic today and sent to infusion as add on for liter IV fluids.Says he is not drinking fluids well and dehydrated.    Intravenous Access:  Peripheral IV placed.    Treatment Conditions:  Meets criteria for Aranesp today with Hgb at 9.8    Post Infusion Assessment:  Patient tolerated infusion without incident.  Blood return noted pre and post infusion.  Site patent and intact, free from redness, edema or discomfort.  No evidence of extravasations.  Access discontinued per protocol.    Discharge Plan:   Discharge instructions reviewed with: Patient.  Patient and/or family verbalized understanding of discharge instructions and all questions answered.  Copy of AVS reviewed with patient and/or family.  Patient will return one week 4/11 for next appointment and to start Velcade treatments.  Patient discharged in stable condition accompanied by: brother.  Departure Mode: Ambulatory.    Effie Fuller RN

## 2018-04-05 NOTE — MR AVS SNAPSHOT
After Visit Summary   4/5/2018    Roc Parkinson    MRN: [id number]           Patient Information     Date Of Birth          7/7/1926        Visit Information        Provider Department      4/5/2018 9:00 AM SH INFUSION CHAIR 11 [facility]        Today's Diagnoses     MDS (myelodysplastic syndrome) (H)    -  1       Follow-ups after your visit        Your next 10 appointments already scheduled     Apr 11, 2018  8:00 AM CDT   Level 6 with SH INFUSION CHAIR 6   [facility] ([facility])    [facility]  [address]  [place] [address]   [phone]            Apr 11, 2018  9:00 AM CDT   Return Visit with Gretel Quinn MD   [facility] ([facility])    [facility]  [address]  [place] [address]   [phone]            Apr 18, 2018  9:00 AM CDT   Level 6 with SH INFUSION CHAIR 14   [facility] ([facility])    [facility]  [address]  [place] [address]   [phone]            Apr 18, 2018 10:45 AM CDT   Return Visit with Gretel Quinn MD   [facility] ([facility])    [facility]  [address]  [place] [address]   [phone]            Apr 25, 2018  8:00 AM CDT   Level 6 with SH INFUSION CHAIR 6   [facility] ([facility])    [facility]  [address]  [place] [address]   [phone]            May 09, 2018  8:00 AM CDT   Level 6 with SH INFUSION CHAIR 5   [facility] ([facility])    [facility]  [address]  [place] [address]   [phone]            May 16, 2018  8:00 AM CDT   Level 6 with SH INFUSION CHAIR 6  "  [facility] ([facility])    [facility] [place]  [address]  [place] [address] [phone]              Who to contact     If you have questions or need follow up information about today's clinic visit or your schedule please contact [facility] directly at [phone].  Normal or non-critical lab and imaging results will be communicated to you by RxVantagehart, letter or phone within 4 business days after the clinic has received the results. If you do not hear from us within 7 days, please contact the clinic through RxVantagehart or phone. If you have a critical or abnormal lab result, we will notify you by phone as soon as possible.  Submit refill requests through Sunglass or call your pharmacy and they will forward the refill request to us. Please allow 3 business days for your refill to be completed.          Additional Information About Your Visit        Sunglass Information     Sunglass lets you send messages to your doctor, view your test results, renew your prescriptions, schedule appointments and more. To sign up, go to [email or web address] . Click on \"Log in\" on the left side of the screen, which will take you to the Welcome page. Then click on \"Sign up Now\" on the right side of the page.     You will be asked to enter the access code listed below, as well as some personal information. Please follow the directions to create your username and password.     Your access code is: [email or web address]  Expires: 2018 11:49 AM     Your access code will  in 90 days. If you need help or a new code, please call your [facility] clinic or [phone].        Care EveryWhere ID     This is your Care EveryWhere ID. This could be used by other organizations to access your [facility] medical records  [id number]        Your Vitals Were     Pulse Temperature Respirations Pulse Oximetry          62 97.9  F (36.6  C) " (Oral) 16 96%         Blood Pressure from Last 3 Encounters:   04/05/18 129/69   04/05/18 100/52   04/03/18 120/60    Weight from Last 3 Encounters:   04/05/18 91.2 kg (201 lb)   04/03/18 90.7 kg (200 lb)   03/29/18 92.2 kg (203 lb 3.2 oz)              Today, you had the following     No orders found for display       Primary Care Provider Office Phone # Fax #    Dickson Reich [phone] [address] [facility] [address]        Equal Access to Services     [facility]: Hadii aad steve hademelyo Soaimee, waaxda luqadaha, qaybta kaalmada adesaideyada, melita shaw . So [facility] [phone].    ATENCIÓN: Si habla español, tiene a nolan disposición servicios gratuitos de asistencia lingüística. [facility] [phone].    We comply with applicable federal civil rights laws and [place] laws. We do not discriminate on the basis of race, color, national origin, age, disability, sex, sexual orientation, or gender identity.            Thank you!     Thank you for choosing [facility]  for your care. Our goal is always to provide you with excellent care. Hearing back from our patients is one way we can continue to improve our services. Please take a few minutes to complete the written survey that you may receive in the mail after your visit with us. Thank you!             Your Updated Medication List - Protect others around you: Learn how to safely use, store and throw away your medicines at www.disposemymeds.org.          This list is accurate as of 4/5/18 11:46 AM.  Always use your most recent med list.                   Brand Name Dispense Instructions for use Diagnosis    ACYCLOVIR PO      Take 400 mg by mouth 2 times daily Oncology to decide when stop date will be        amLODIPine 10 MG tablet    NORVASC    30 tablet    Take 1 tablet (10 mg) by mouth daily    Essential hypertension       * B-D U/F 31G X 8 MM   Generic drug:  insulin  pen needle           * B-D U/F 31G X 8 MM   Generic drug:  insulin pen needle     500 each    USE 5 PEN NEEDLES PER DAY OR AS DIRECTED    Type 2 diabetes mellitus with stage 3 chronic kidney disease, with long-term current use of insulin (H)       Blood Glucose Monitoring Suppl Misc      3 times daily (before meals)        ferrous sulfate 325 (65 FE) MG tablet    IRON     Take 325 mg by mouth daily (with breakfast)        * FREESTYLE LITE test strip   Generic drug:  blood glucose monitoring     200 strip    USE 1 STRIP TO TEST TWICE DAILY.    Uncontrolled type 1 diabetes mellitus with stage 3 chronic kidney disease (H)       * FREESTYLE LITE test strip   Generic drug:  blood glucose monitoring     400 strip    USE TO TEST FOUR TIMES DAILY    Uncontrolled type 1 diabetes mellitus with stage 3 chronic kidney disease (H)       furosemide 20 MG tablet    LASIX    90 tablet    Take 1 tablet (20 mg) by mouth daily    Multiple myeloma not having achieved remission (H)       gemfibrozil 600 MG tablet    LOPID    180 tablet    TAKE 1 TABLET BY MOUTH TWICE DAILY    Hyperlipidemia       * INSULIN ASPART SC      Inject 13 Units Subcutaneous every morning        * INSULIN ASPART SC      Inject 11 Units Subcutaneous daily (before lunch)        * INSULIN ASPART SC      Inject Subcutaneous At Bedtime Inject 4 unit subcutaneously at bedtime for Diabetes II Ok to use Humalog insulin with same order details        * INSULIN ASPART SC      Inject 9 Units Subcutaneous daily (with dinner)        * INSULIN ASPART SC      Inject Subcutaneous 4 times daily Sliding scale: 140-175 1 unit 176-210 2 units 211-245 3 units 246-280 4 units 281-315 5 units 316-350 6 units 351-385 7 units 386-420 8 units >420 9 units        insulin detemir 100 UNIT/ML injection    LEVEMIR FLEXPEN/FLEXTOUCH    15 mL    Inject 10 Units Subcutaneous every morning (before breakfast)    Type 2 diabetes mellitus with diabetic chronic kidney disease, unspecified CKD stage,  unspecified long term insulin use status (H), Uncontrolled type 2 diabetes mellitus with hyperglycemia, unspecified long term insulin use status (H), Type 2 diabetes mellitus with stage 3 chronic kidney disease, with long-term current use of insulin (H)       levothyroxine 25 MCG tablet    SYNTHROID/LEVOTHROID    90 tablet    TAKE 1 TABLET BY MOUTH EVERY DAY    Acquired hypothyroidism       LISINOPRIL PO      Take 15 mg by mouth daily        metoprolol succinate 50 MG 24 hr tablet    TOPROL-XL    30 tablet    Take 1 tablet (50 mg) by mouth daily    Atrial fibrillation with rapid ventricular response (H)       nystatin 260957 UNIT/GM Powd    MYCOSTATIN     Apply topically 4 times daily Also taking prn. Apply topically to groin area for yeast        nystatin 111021 UNIT/ML suspension    MYCOSTATIN     Take 500,000 Units by mouth 4 times daily        polyethylene glycol Packet    MIRALAX/GLYCOLAX    7 packet    Take 17 g by mouth 2 times daily as needed (constipation)    Slow transit constipation       potassium chloride 10 MEQ tablet    K-TAB,KLOR-CON     Take 10 mEq by mouth daily        Selenium 200 MCG Tabs      Take 100 mcg by mouth daily. Pt takes one half tab of the 200 mcg daily        VITAMIN C PO      Take 500 mg by mouth daily        VITAMIN D (CHOLECALCIFEROL) PO      Take 1,000 Units by mouth        * Notice:  This list has 9 medication(s) that are the same as other medications prescribed for you. Read the directions carefully, and ask your doctor or other care provider to review them with you.

## 2018-04-05 NOTE — PATIENT INSTRUCTIONS
1. NP visit next week. Start weekly Velcade/dexamethasone, next week- chemo education today.( Done-CW)  Scheduled/janice  2. Zometa every 12 weeks  Scheduled/janice  3. Continue Aranesp 300 mcg SQ every three weeks  Scheduled/janice  4. Normal saline 1 liter IV today./ Done -CW  5. Labs today- CBC diff, CMP  6. RTC MD 4/19 Scheduled for 4/18/janice      AVS printed & given to patient/janice

## 2018-04-10 NOTE — TELEPHONE ENCOUNTER
Called and spoke with Roc today about his decadron pre-medication.  It is ordered to take 40 mg oral decadron days 1,8 and 15 of treatment.  Jerry in pharmacy messaged Dr. Quinn to see if we could decrease this dose since it spikes his blood sugars so high.    Advised Roc to bring the 10 tablets with him to the appointment and we will let him know how many to take when we hear back from Dr. Quinn.

## 2018-04-11 NOTE — TELEPHONE ENCOUNTER
Mr. Roc Parkinson comes to clinic today for C1D1 Velcade/Dexamethazone and Zometa. This clinician attempted to visit pt for routine psychosocial check-in and support regarding new treatment and ongoing needs in home. Pt had already left by time this clinician came in for visit. This clinician attempted pt by phone, and LVM with contact information. This clinician will plan to see pt next week for psychosocial check-in and support. Family has this clinician's contact information and knows to reach out to this clinician prior in the case of psychosocial distress or need.     Plan:   1) This clinician will see pt 4/18/18 and follow-up regarding home care needs. This clinician will also plan to discuss [employer] Travel Assistance application with pt  2) Will continue to follow pt and collaborate with pt's care team surrounding ongoing needs.     WESLEY Negro, LICSW  Phone: [phone]  Pager: [phone]    [facility]: M, T  *every other Thursday, 8am-4:30pm  [place]: W, F, *every other Thursday, 8am-4:30pm

## 2018-04-11 NOTE — PROGRESS NOTES
"Oncology Rooming Note    April 11, 2018 8:33 AM   Roc Parkinson is a 89 year old male who presents for:    Chief Complaint   Patient presents with     Oncology Clinic Visit     MDS (myelodysplastic syndrome) (H)     Initial Vitals: /67  Pulse 73  Temp 98.2  F (36.8  C) (Oral)  Resp 14  Ht 1.727 m (5' 7.99\")  Wt 92.4 kg (203 lb 9.6 oz)  BMI 30.96 kg/m2 Estimated body mass index is 30.96 kg/(m^2) as calculated from the following:    Height as of this encounter: 1.727 m (5' 7.99\").    Weight as of this encounter: 92.4 kg (203 lb 9.6 oz). Body surface area is 2.11 meters squared.  Data Unavailable Comment: Data Unavailable   No LMP for male patient.  Allergies reviewed: Yes  Medications reviewed: Yes    Medications: Medication refills not needed today.  Pharmacy name entered into [facility]:    [facility] [place], [place] - [address] [facility] [address] [place] [address] [address] AT [address]    Clinical concerns: no    5 minutes for nursing intake (face to face time)          Winsome Diaz MA              "

## 2018-04-11 NOTE — PROGRESS NOTES
"[facility] PHYSICIANS  HEMATOLOGY ONCOLOGY     DIAGNOSIS:    1- Kappa light chain IgM multiple myeloma in a 89-year-old patient.  Bone marrow biopsy on 11/17/2016 showed hypercellular bone marrow with 65% cellularity of light chain restricted plasma cells.  FISH or G-banding could not be performed due to insufficient sample.   There is a background of myelodysplastic syndrome.  The patient was initially seen in the hospital on 11/17/2016 for macrocytic anemia and pancytopenia and transfusion requirement and a bone marrow biopsy was performed.   2- History of prostate cancer s/p EBRT s/p brachytherapy in 2003 (recent PSA 0.10), superficial bladder cancer ,no recurrences since 1986     TREATMENT: 12/15/16 velcade/dex. 4/12/17 added cyclophosphamide 300 mg weekly.6/28/17 we discontinued velcade due to concern for neuropathy and continued with weekly cyclophosphamide and dexamethasone.   1/2/18 switched to Carfilzomib and dexamethasone.   3/14/18 started on Daratumumab and dexamethasone.      SUBJECTIVE:  The patient was seen as a followup today. He is feeling better. Appears well rested.     REVIEW OF SYSTEMS:  A complete review of systems was performed and found to be negative other than pertinent positives mentioned in history of present illness.     Past medical, social histories reviewed.    Meds- Reviewed.     PHYSICAL EXAMINATION:   VITAL SIGNS:/67  Pulse 73  Temp 98.2  F (36.8  C) (Oral)  Resp 14  Ht 1.727 m (5' 7.99\")  Wt 92.4 kg (203 lb 9.6 oz)  BMI 30.96 kg/m2  CONSTITUTIONAL: Sitting comfortably.   HEENT: Pupils are equal. Oropharynx is clear.   NECK: No cervical or supraclavicular lymphadenopathy.   RESPIRATORY: Clear bilaterally.   CARD/VASC: S1, S2, regular.   GI: Soft, nontender, nondistended, no hepatosplenomegaly.   MUSKULOSKELETAL: Warm, well perfused.   NEUROLOGIC: Alert, awake.   INTEGUMENT: No rash.   LYMPHATICS: No edema.   PSYCH: Mood and affect was normal.     LABORATORY " DATA AND IMAGING REVIEWED DURING THIS VISIT:  Recent Labs   Lab Test  04/05/18   0835  04/03/18   0918  03/27/18   0545  03/26/18   0607  03/23/18   0532   01/21/18   0834  01/20/18   0220   NA  132*  135  139  141  135   < >  140  139   POTASSIUM  4.2  4.7  3.5  3.6  4.2   < >  4.1  4.1   CHLORIDE  101  101  106  108  106   < >  108  107   CO2  24  26  24  25  20   < >  22  22   ANIONGAP  7  8  9  8  9   < >  10  10   BUN  39*  33*  18  22  36*   < >  29  45*   CR  1.26*  1.18  0.90  0.91  1.06   < >  1.02  1.45*   GLC  176*   --   83  134*  446*   < >  183*  75   MICHELLE  9.5   --   8.5  8.7  8.0*   < >  8.8  9.1   MAG   --    --    --   2.0  1.8   < >   --   2.2   PHOS   --    --    --    --    --    --   2.9  2.2*    < > = values in this interval not displayed.     Recent Labs   Lab Test  04/05/18   0835  04/03/18   0918  03/27/18   0545   WBC  5.2  4.9  3.5*   HGB  9.8*  10.4*  10.0*   PLT  123*  119*  96*   MCV  106*  111*  107*   NEUTROPHIL  73.7  74.2  72.9     Recent Labs   Lab Test  04/05/18   0835  03/22/18   1335  03/07/18   1130   11/17/16   0938   BILITOTAL  0.5  0.3  0.5   < >   --    ALKPHOS  77  86  99   < >   --    ALT  12  15  14   < >   --    AST  7  16  15   < >   --    ALBUMIN  2.7*  2.7*  3.1*   < >   --    LDH   --    --    --    --   110    < > = values in this interval not displayed.       ECOG PS: 1    ASSESSMENT:  This is a 89-year-old gentleman who has kappa light chain multiple myeloma with hypercellular marrow with 65% of cells are light chain restricted plasma cells.  He had severe pancytopenia and has needed a transfusion in the hospital.  He did not have hypercalcemia and his renal function was normal. He has a background of myelodysplastic syndrome on his bone marrow biopsy; however, majority of the cell population was monoclonal plasma cell population.   He was started on treatment due to cytopenia.   In 4/2017 his light chain levels were getting worse. M spike was stable. We added  cyclophosphamide to the regimen. In 6/2017 discontinued velcade for concern of development of neuropathy, in hindsight the pain appeared to be related to arthritis.     He is on Aranesp 300 mcg SQ every three weeks.  He was switched to Daratumumab and Dex on 3/14/18. The first dose resulted in reaction, he was admitted to the hospital with A fib and had complications/pneumonia.     - He is starting Velcade and dexamethasone   - Labs from today are in progress.     PLAN:   1. Start weekly Velcade/dexamethasone today.  2. Zometa every 12 weeks  3. Continue Aranesp 300 mcg SQ every three weeks  4. See NP in 2 weeks, see me in 4 weeks    ALISON JON MD    4/11/2018

## 2018-04-11 NOTE — MR AVS SNAPSHOT
After Visit Summary   4/11/2018    Roc Parkinson    MRN: [id number]           Patient Information     Date Of Birth          7/7/1926        Visit Information        Provider Department      4/11/2018 9:00 AM Gretel Quinn MD [facility]        Today's Diagnoses     Multiple myeloma not having achieved remission (H)    -  1      Care Instructions    1. Start weekly Velcade/dexamethasone today.  2. Zometa every 12 weeks  3. Continue Aranesp 300 mcg SQ every three weeks  4. See NP in 2 weeks, see me in 4 weeks          Follow-ups after your visit        Your next 10 appointments already scheduled     Apr 18, 2018 10:15 AM CDT   Return Visit with  Oncology Nurse   [facility] ([facility])    [facility]  [address]  [place] [address]   [phone]            Apr 18, 2018 10:45 AM CDT   Return Visit with Gretel Quinn MD   [facility] ([facility])    [facility] [place] [id number]  [place] [address]   [phone]            Apr 18, 2018 11:30 AM CDT   Level 6 with  INFUSION CHAIR 14   [facility] ([facility])    [facility]  [address]  [place] [address]   [phone]            Apr 25, 2018  8:00 AM CDT   Level 6 with  INFUSION CHAIR 6   [facility] ([facility])    [facility]  [address]  [place] [address]   [phone]            May 09, 2018  8:00 AM CDT   Level 6 with  INFUSION CHAIR 5   [facility] ([facility])    [facility]  [address]  [place] [address]   [phone]            May 16, 2018  8:00 AM CDT   Level 6 with  INFUSION CHAIR 6   [facility] ([place]  "[facility])    [facility] [place]  [address]  [place] [address] [phone]              Who to contact     If you have questions or need follow up information about today's clinic visit or your schedule please contact [facility] directly at [phone].  Normal or non-critical lab and imaging results will be communicated to you by MyChart, letter or phone within 4 business days after the clinic has received the results. If you do not hear from us within 7 days, please contact the clinic through MyChart or phone. If you have a critical or abnormal lab result, we will notify you by phone as soon as possible.  Submit refill requests through [facility] or call your pharmacy and they will forward the refill request to us. Please allow 3 business days for your refill to be completed.          Additional Information About Your Visit        [facility] Information     [facility] lets you send messages to your doctor, view your test results, renew your prescriptions, schedule appointments and more. To sign up, go to [email or web address] . Click on \"Log in\" on the left side of the screen, which will take you to the Welcome page. Then click on \"Sign up Now\" on the right side of the page.     You will be asked to enter the access code listed below, as well as some personal information. Please follow the directions to create your username and password.     Your access code is: [email or web address]  Expires: 2018 11:49 AM     Your access code will  in 90 days. If you need help or a new code, please call your [place] clinic or [phone].        Care EveryWhere ID     This is your Care EveryWhere ID. This could be used by other organizations to access your [place] medical records  [id number]        Your Vitals Were     Pulse Temperature Respirations Height BMI (Body Mass Index)       73 98.2  F (36.8  C) (Oral) 14 1.727 m (5' 7.99\") 30.96 kg/m2        Blood Pressure from " Last 3 Encounters:   04/11/18 131/67   04/11/18 120/72   04/05/18 129/69    Weight from Last 3 Encounters:   04/11/18 92.4 kg (203 lb 9.6 oz)   04/11/18 92.4 kg (203 lb 9.6 oz)   04/05/18 91.2 kg (201 lb)              Today, you had the following     No orders found for display         Today's Medication Changes          These changes are accurate as of 4/11/18 11:59 PM.  If you have any questions, ask your nurse or doctor.               Start taking these medicines.        Dose/Directions    dexamethasone 4 MG tablet   Commonly known as:  DECADRON   Used for:  Multiple myeloma not having achieved remission (H)        Take 10 tablets (40 mg) by mouth on Days 1, 8, and 15.   Quantity:  30 tablet   Refills:  0       LORazepam 0.5 MG tablet   Commonly known as:  ATIVAN   Used for:  Multiple myeloma not having achieved remission (H)        Dose:  0.5 mg   Take 1 tablet (0.5 mg) by mouth every 4 hours as needed (Anxiety, Nausea/Vomiting or Sleep)   Quantity:  30 tablet   Refills:  3       prochlorperazine 10 MG tablet   Commonly known as:  COMPAZINE   Used for:  Multiple myeloma not having achieved remission (H)        Dose:  10 mg   Take 1 tablet (10 mg) by mouth every 6 hours as needed (Nausea/Vomiting)   Quantity:  30 tablet   Refills:  3         These medicines have changed or have updated prescriptions.        Dose/Directions    * ACYCLOVIR PO   This may have changed:  Another medication with the same name was added. Make sure you understand how and when to take each.        Dose:  400 mg   Take 400 mg by mouth 2 times daily Oncology to decide when stop date will be   Refills:  0       * acyclovir 400 MG tablet   Commonly known as:  ZOVIRAX   This may have changed:  You were already taking a medication with the same name, and this prescription was added. Make sure you understand how and when to take each.   Used for:  Multiple myeloma not having achieved remission (H)        Dose:  400 mg   Take 1 tablet (400 mg) by  mouth 2 times daily Viral Prophylaxis.   Quantity:  60 tablet   Refills:  7       * Notice:  This list has 2 medication(s) that are the same as other medications prescribed for you. Read the directions carefully, and ask your doctor or other care provider to review them with you.         Where to get your medicines      These medications were sent to [facility] [place], [place] - [address]  [address] [address]     Phone:  [phone]     acyclovir 400 MG tablet    dexamethasone 4 MG tablet    prochlorperazine 10 MG tablet         Some of these will need a paper prescription and others can be bought over the counter.  Ask your nurse if you have questions.     Bring a paper prescription for each of these medications     LORazepam 0.5 MG tablet                Primary Care Provider Office Phone # Fax #    Dickson Reich [phone] [address]  [facility] [address]        Equal Access to Services     ELIE GAGNON AH: Hadii virginia ku hadasho Soomaali, waaxda luqadaha, qaybta kaalmada adeegyada, waxay carmelin haymarge shaw . So [facility] [phone].    ATENCIÓN: Si habla español, tiene a nolan disposición servicios gratuitos de asistencia lingüística. Llame al [phone].    We comply with applicable federal civil rights laws and [place] laws. We do not discriminate on the basis of race, color, national origin, age, disability, sex, sexual orientation, or gender identity.            Thank you!     Thank you for choosing [facility]  for your care. Our goal is always to provide you with excellent care. Hearing back from our patients is one way we can continue to improve our services. Please take a few minutes to complete the written survey that you may receive in the mail after your visit with us. Thank you!             Your Updated Medication List - Protect others around you: Learn how to safely use, store and throw away  your medicines at www.disposemymeds.org.          This list is accurate as of 4/11/18 11:59 PM.  Always use your most recent med list.                   Brand Name Dispense Instructions for use Diagnosis    * ACYCLOVIR PO      Take 400 mg by mouth 2 times daily Oncology to decide when stop date will be        * acyclovir 400 MG tablet    ZOVIRAX    60 tablet    Take 1 tablet (400 mg) by mouth 2 times daily Viral Prophylaxis.    Multiple myeloma not having achieved remission (H)       amLODIPine 10 MG tablet    NORVASC    30 tablet    Take 1 tablet (10 mg) by mouth daily    Essential hypertension       * B-D U/F 31G X 8 MM   Generic drug:  insulin pen needle           * B-D U/F 31G X 8 MM   Generic drug:  insulin pen needle     500 each    USE 5 PEN NEEDLES PER DAY OR AS DIRECTED    Type 2 diabetes mellitus with stage 3 chronic kidney disease, with long-term current use of insulin (H)       Blood Glucose Monitoring Suppl Misc      3 times daily (before meals)        dexamethasone 4 MG tablet    DECADRON    30 tablet    Take 10 tablets (40 mg) by mouth on Days 1, 8, and 15.    Multiple myeloma not having achieved remission (H)       ferrous sulfate 325 (65 Fe) MG tablet    IRON     Take 325 mg by mouth daily (with breakfast)        * FREESTYLE LITE test strip   Generic drug:  blood glucose monitoring     200 strip    USE 1 STRIP TO TEST TWICE DAILY.    Uncontrolled type 1 diabetes mellitus with stage 3 chronic kidney disease (H)       * FREESTYLE LITE test strip   Generic drug:  blood glucose monitoring     400 strip    USE TO TEST FOUR TIMES DAILY    Uncontrolled type 1 diabetes mellitus with stage 3 chronic kidney disease (H)       furosemide 20 MG tablet    LASIX    90 tablet    Take 1 tablet (20 mg) by mouth daily    Multiple myeloma not having achieved remission (H)       gemfibrozil 600 MG tablet    LOPID    180 tablet    TAKE 1 TABLET BY MOUTH TWICE DAILY    Hyperlipidemia       * INSULIN ASPART SC      Inject  13 Units Subcutaneous every morning        * INSULIN ASPART SC      Inject 11 Units Subcutaneous daily (before lunch)        * INSULIN ASPART SC      Inject Subcutaneous At Bedtime Inject 4 unit subcutaneously at bedtime for Diabetes II Ok to use Humalog insulin with same order details        * INSULIN ASPART SC      Inject 9 Units Subcutaneous daily (with dinner)        * INSULIN ASPART SC      Inject Subcutaneous 4 times daily Sliding scale: 140-175 1 unit 176-210 2 units 211-245 3 units 246-280 4 units 281-315 5 units 316-350 6 units 351-385 7 units 386-420 8 units >420 9 units        insulin detemir 100 UNIT/ML injection    LEVEMIR FLEXPEN/FLEXTOUCH    15 mL    Inject 10 Units Subcutaneous every morning (before breakfast)    Type 2 diabetes mellitus with diabetic chronic kidney disease, unspecified CKD stage, unspecified long term insulin use status (H), Uncontrolled type 2 diabetes mellitus with hyperglycemia, unspecified long term insulin use status (H), Type 2 diabetes mellitus with stage 3 chronic kidney disease, with long-term current use of insulin (H)       levothyroxine 25 MCG tablet    SYNTHROID/LEVOTHROID    90 tablet    TAKE 1 TABLET BY MOUTH EVERY DAY    Acquired hypothyroidism       LISINOPRIL PO      Take 15 mg by mouth daily        LORazepam 0.5 MG tablet    ATIVAN    30 tablet    Take 1 tablet (0.5 mg) by mouth every 4 hours as needed (Anxiety, Nausea/Vomiting or Sleep)    Multiple myeloma not having achieved remission (H)       metoprolol succinate 50 MG 24 hr tablet    TOPROL-XL    30 tablet    Take 1 tablet (50 mg) by mouth daily    Atrial fibrillation with rapid ventricular response (H)       nystatin 252491 UNIT/GM Powd    MYCOSTATIN     Apply topically 4 times daily Also taking prn. Apply topically to groin area for yeast        polyethylene glycol Packet    MIRALAX/GLYCOLAX    7 packet    Take 17 g by mouth 2 times daily as needed (constipation)    Slow transit constipation       potassium  chloride 10 MEQ tablet    K-TAB,KLOR-CON     Take 10 mEq by mouth daily        prochlorperazine 10 MG tablet    COMPAZINE    30 tablet    Take 1 tablet (10 mg) by mouth every 6 hours as needed (Nausea/Vomiting)    Multiple myeloma not having achieved remission (H)       Selenium 200 MCG Tabs      Take 100 mcg by mouth daily. Pt takes one half tab of the 200 mcg daily        VITAMIN C PO      Take 500 mg by mouth daily        VITAMIN D (CHOLECALCIFEROL) PO      Take 1,000 Units by mouth        * Notice:  This list has 11 medication(s) that are the same as other medications prescribed for you. Read the directions carefully, and ask your doctor or other care provider to review them with you.

## 2018-04-11 NOTE — TELEPHONE ENCOUNTER
Spoke with Alvarez and we will keep Cliff at 40 mg decadron on treatment days.  Per infusion KENDALL John he brought his Decadron bottle from his 1 dose of daratumumab which was a 20 mg decadron premedication and took 20 mg decadron today while he was in infusion.    Rn will call patient and tell him to take 5 more pills of dex or 20 mg to equal 40.  Message left for Roc to call back

## 2018-04-11 NOTE — PROGRESS NOTES
Infusion Nursing Note:  Roc SILVA Tesha presents today for Velcade Zometa.    Patient seen by provider today: Yes: Dr Quinn   present during visit today: Not Applicable.    Note: Took Decadron 40mg po prior to Velcade dose today No Aranesp needed today. .    Intravenous Access:  Labs drawn without difficulty.  Peripheral IV placed.    Treatment Conditions:  Lab Results   Component Value Date    HGB 9.3 04/11/2018     Lab Results   Component Value Date    WBC 4.0 04/11/2018      Lab Results   Component Value Date    ANEU 2.6 04/11/2018     Lab Results   Component Value Date     04/11/2018      Lab Results   Component Value Date     04/11/2018                   Lab Results   Component Value Date    POTASSIUM 4.2 04/11/2018           Lab Results   Component Value Date    MAG 2.0 03/26/2018            Lab Results   Component Value Date    CR 0.94 04/11/2018                   Lab Results   Component Value Date    MICHELLE 8.9 04/11/2018                Lab Results   Component Value Date    BILITOTAL 0.5 04/11/2018           Lab Results   Component Value Date    ALBUMIN 2.6 04/11/2018                    Lab Results   Component Value Date    ALT 11 04/11/2018           Lab Results   Component Value Date    AST 13 04/11/2018       Results reviewed, labs MET treatment parameters, ok to proceed with treatment.      Post Infusion Assessment:  Patient tolerated infusion without incident.  Patient tolerated injection without incident.  Blood return noted pre and post infusion.  Site patent and intact, free from redness, edema or discomfort.  No evidence of extravasations.  Access discontinued per protocol.    Discharge Plan:   Prescription refills given for Ativan Compazine Acyclovir and Decadron   Discharge instructions reviewed with: Patient.  Patient and/or family verbalized understanding of discharge instructions and all questions answered.  Copy of AVS reviewed with patient and/or family.  Patient will  return 4/18/2018 for next appointment.  Patient discharged in stable condition accompanied by: self.  Departure Mode: Ambulatory.    Dora Mcgrath RN

## 2018-04-11 NOTE — LETTER
"    4/11/2018         RE: Roc Parkinson  [address]   [place] [address]        Dear Colleague,    Thank you for referring your patient, Roc Parkinson, to the [facility]. Please see a copy of my visit note below.    Oncology Rooming Note    April 11, 2018 8:33 AM   Roc Parkinson is a 89 year old male who presents for:    Chief Complaint   Patient presents with     Oncology Clinic Visit     MDS (myelodysplastic syndrome) (H)     Initial Vitals: /67  Pulse 73  Temp 98.2  F (36.8  C) (Oral)  Resp 14  Ht 1.727 m (5' 7.99\")  Wt 92.4 kg (203 lb 9.6 oz)  BMI 30.96 kg/m2 Estimated body mass index is 30.96 kg/(m^2) as calculated from the following:    Height as of this encounter: 1.727 m (5' 7.99\").    Weight as of this encounter: 92.4 kg (203 lb 9.6 oz). Body surface area is 2.11 meters squared.  Data Unavailable Comment: Data Unavailable   No LMP for male patient.  Allergies reviewed: Yes  Medications reviewed: Yes    Medications: Medication refills not needed today.  Pharmacy name entered into [facility]:    [facility] [place], [place] - [address] [facility] [id number] [place] [address] [address] AT [facility]    Clinical concerns: no    5 minutes for nursing intake (face to face time)          Winsome Diaz MA                [facility]  HEMATOLOGY ONCOLOGY     DIAGNOSIS:    1- Kappa light chain IgM multiple myeloma in a 89-year-old patient.  Bone marrow biopsy on 11/17/2016 showed hypercellular bone marrow with 65% cellularity of light chain restricted plasma cells.  FISH or G-banding could not be performed due to insufficient sample.   There is a background of myelodysplastic syndrome.  The patient was initially seen in the hospital on 11/17/2016 for macrocytic anemia and pancytopenia and transfusion requirement and a bone marrow biopsy was performed.   2- History of prostate cancer s/p EBRT " "s/p brachytherapy in 2003 (recent PSA 0.10), superficial bladder cancer ,no recurrences since 1986     TREATMENT: 12/15/16 velcade/dex. 4/12/17 added cyclophosphamide 300 mg weekly.6/28/17 we discontinued velcade due to concern for neuropathy and continued with weekly cyclophosphamide and dexamethasone.   1/2/18 switched to Carfilzomib and dexamethasone.   3/14/18 started on Daratumumab and dexamethasone.      SUBJECTIVE:  The patient was seen as a followup today. He is feeling better. Appears well rested.     REVIEW OF SYSTEMS:  A complete review of systems was performed and found to be negative other than pertinent positives mentioned in history of present illness.     Past medical, social histories reviewed.    Meds- Reviewed.     PHYSICAL EXAMINATION:   VITAL SIGNS:/67  Pulse 73  Temp 98.2  F (36.8  C) (Oral)  Resp 14  Ht 1.727 m (5' 7.99\")  Wt 92.4 kg (203 lb 9.6 oz)  BMI 30.96 kg/m2  CONSTITUTIONAL: Sitting comfortably.   HEENT: Pupils are equal. Oropharynx is clear.   NECK: No cervical or supraclavicular lymphadenopathy.   RESPIRATORY: Clear bilaterally.   CARD/VASC: S1, S2, regular.   GI: Soft, nontender, nondistended, no hepatosplenomegaly.   MUSKULOSKELETAL: Warm, well perfused.   NEUROLOGIC: Alert, awake.   INTEGUMENT: No rash.   LYMPHATICS: No edema.   PSYCH: Mood and affect was normal.     LABORATORY DATA AND IMAGING REVIEWED DURING THIS VISIT:  Recent Labs   Lab Test  04/05/18   0835  04/03/18   0918  03/27/18   0545  03/26/18   0607  03/23/18   0532   01/21/18   0834  01/20/18   0220   NA  132*  135  139  141  135   < >  140  139   POTASSIUM  4.2  4.7  3.5  3.6  4.2   < >  4.1  4.1   CHLORIDE  101  101  106  108  106   < >  108  107   CO2  24  26  24  25  20   < >  22  22   ANIONGAP  7  8  9  8  9   < >  10  10   BUN  39*  33*  18  22  36*   < >  29  45*   CR  1.26*  1.18  0.90  0.91  1.06   < >  1.02  1.45*   GLC  176*   --   83  134*  446*   < >  183*  75   MICHELLE  9.5   --   8.5  8.7  8.0* "   < >  8.8  9.1   MAG   --    --    --   2.0  1.8   < >   --   2.2   PHOS   --    --    --    --    --    --   2.9  2.2*    < > = values in this interval not displayed.     Recent Labs   Lab Test  04/05/18   0835  04/03/18   0918  03/27/18   0545   WBC  5.2  4.9  3.5*   HGB  9.8*  10.4*  10.0*   PLT  123*  119*  96*   MCV  106*  111*  107*   NEUTROPHIL  73.7  74.2  72.9     Recent Labs   Lab Test  04/05/18   0835  03/22/18   1335  03/07/18   1130   11/17/16   0938   BILITOTAL  0.5  0.3  0.5   < >   --    ALKPHOS  77  86  99   < >   --    ALT  12  15  14   < >   --    AST  7  16  15   < >   --    ALBUMIN  2.7*  2.7*  3.1*   < >   --    LDH   --    --    --    --   110    < > = values in this interval not displayed.       ECOG PS: 1    ASSESSMENT:  This is a 89-year-old gentleman who has kappa light chain multiple myeloma with hypercellular marrow with 65% of cells are light chain restricted plasma cells.  He had severe pancytopenia and has needed a transfusion in the hospital.  He did not have hypercalcemia and his renal function was normal. He has a background of myelodysplastic syndrome on his bone marrow biopsy; however, majority of the cell population was monoclonal plasma cell population.   He was started on treatment due to cytopenia.   In 4/2017 his light chain levels were getting worse. M spike was stable. We added cyclophosphamide to the regimen. In 6/2017 discontinued velcade for concern of development of neuropathy, in hindsight the pain appeared to be related to arthritis.     He is on Aranesp 300 mcg SQ every three weeks.  He was switched to Daratumumab and Dex on 3/14/18. The first dose resulted in reaction, he was admitted to the hospital with A fib and had complications/pneumonia.     - He is starting Velcade and dexamethasone   - Labs from today are in progress.     PLAN:   1. Start weekly Velcade/dexamethasone today.  2. Zometa every 12 weeks  3. Continue Aranesp 300 mcg SQ every three weeks  4. See  NP in 2 weeks, see me in 4 weeks    GRETEL QUINN MD    4/11/2018        Again, thank you for allowing me to participate in the care of your patient.        Sincerely,        Gretel Quinn MD

## 2018-04-11 NOTE — MR AVS SNAPSHOT
After Visit Summary   4/11/2018    Roc Parkinson    MRN: [id number]           Patient Information     Date Of Birth          7/7/1926        Visit Information        Provider Department      4/11/2018 8:00 AM  INFUSION CHAIR 6 [facility]        Today's Diagnoses     Multiple myeloma not having achieved remission (H)    -  1    Hypercalcemia           Follow-ups after your visit        Follow-up notes from your care team     Return in 7 days (on 4/18/2018).      Your next 10 appointments already scheduled     Apr 18, 2018 10:15 AM CDT   Return Visit with  Oncology Nurse   [facility] ([facility])    [facility]  [address]  [place] [address]   [phone]            Apr 18, 2018 10:45 AM CDT   Return Visit with Gretel Quinn MD   [facility] ([facility])    [facility]  [address]  [place] [address]   [phone]            Apr 18, 2018 11:30 AM CDT   Level 6 with  INFUSION CHAIR 14   [facility] ([facility])    [facility]  [address]  [place] [address]   [phone]            Apr 25, 2018  8:00 AM CDT   Level 6 with  INFUSION CHAIR 6   [facility] ([facility])    [facility]  [address]  [place] [address]   [phone]            May 09, 2018  8:00 AM CDT   Level 6 with  INFUSION CHAIR 5   [facility] ([facility])    [facility]  [address]  [place] [address]   [phone]            May 16, 2018  8:00 AM CDT   Level 6 with  INFUSION CHAIR 6   [facility] ([facility])    [facility]  [address]  "[place] [id number] [phone]              Who to contact     If you have questions or need follow up information about today's clinic visit or your schedule please contact [facility] directly at [phone].  Normal or non-critical lab and imaging results will be communicated to you by MyChart, letter or phone within 4 business days after the clinic has received the results. If you do not hear from us within 7 days, please contact the clinic through MyChart or phone. If you have a critical or abnormal lab result, we will notify you by phone as soon as possible.  Submit refill requests through [employer] or call your pharmacy and they will forward the refill request to us. Please allow 3 business days for your refill to be completed.          Additional Information About Your Visit        MyChart Information     [employer] lets you send messages to your doctor, view your test results, renew your prescriptions, schedule appointments and more. To sign up, go to [email or web address] . Click on \"Log in\" on the left side of the screen, which will take you to the Welcome page. Then click on \"Sign up Now\" on the right side of the page.     You will be asked to enter the access code listed below, as well as some personal information. Please follow the directions to create your username and password.     Your access code is: [email or web address]  Expires: 2018 11:49 AM     Your access code will  in 90 days. If you need help or a new code, please call your [facility] clinic or [phone].        Care EveryWhere ID     This is your Care EveryWhere ID. This could be used by other organizations to access your [facility] medical records  [id number]        Your Vitals Were     Pulse Temperature Respirations Height BMI (Body Mass Index)       76 98.2  F (36.8  C) (Oral) 14 1.727 m (5' 7.99\") 30.96 kg/m2        Blood Pressure from Last 3 Encounters:   18 131/67   18 120/72 "   04/05/18 129/69    Weight from Last 3 Encounters:   04/11/18 92.4 kg (203 lb 9.6 oz)   04/11/18 92.4 kg (203 lb 9.6 oz)   04/05/18 91.2 kg (201 lb)              We Performed the Following     Bilirubin direct     CBC with platelets differential     Comprehensive metabolic panel     Kappa and lambda light chain     Protein electrophoresis     Protein Immunofixation Serum          Today's Medication Changes          These changes are accurate as of 4/11/18  4:54 PM.  If you have any questions, ask your nurse or doctor.               Start taking these medicines.        Dose/Directions    dexamethasone 4 MG tablet   Commonly known as:  DECADRON   Used for:  Multiple myeloma not having achieved remission (H)        Take 10 tablets (40 mg) by mouth on Days 1, 8, and 15.   Quantity:  30 tablet   Refills:  0       LORazepam 0.5 MG tablet   Commonly known as:  ATIVAN   Used for:  Multiple myeloma not having achieved remission (H)        Dose:  0.5 mg   Take 1 tablet (0.5 mg) by mouth every 4 hours as needed (Anxiety, Nausea/Vomiting or Sleep)   Quantity:  30 tablet   Refills:  3       prochlorperazine 10 MG tablet   Commonly known as:  COMPAZINE   Used for:  Multiple myeloma not having achieved remission (H)        Dose:  10 mg   Take 1 tablet (10 mg) by mouth every 6 hours as needed (Nausea/Vomiting)   Quantity:  30 tablet   Refills:  3         These medicines have changed or have updated prescriptions.        Dose/Directions    * ACYCLOVIR PO   This may have changed:  Another medication with the same name was added. Make sure you understand how and when to take each.        Dose:  400 mg   Take 400 mg by mouth 2 times daily Oncology to decide when stop date will be   Refills:  0       * acyclovir 400 MG tablet   Commonly known as:  ZOVIRAX   This may have changed:  You were already taking a medication with the same name, and this prescription was added. Make sure you understand how and when to take each.   Used for:   Multiple myeloma not having achieved remission (H)        Dose:  400 mg   Take 1 tablet (400 mg) by mouth 2 times daily Viral Prophylaxis.   Quantity:  60 tablet   Refills:  7       * Notice:  This list has 2 medication(s) that are the same as other medications prescribed for you. Read the directions carefully, and ask your doctor or other care provider to review them with you.         Where to get your medicines      These medications were sent to [facility] [place], [place] - [address]  [address] [facility] [address], [place] [address]     Phone:  [phone]     acyclovir 400 MG tablet    dexamethasone 4 MG tablet    prochlorperazine 10 MG tablet         Some of these will need a paper prescription and others can be bought over the counter.  Ask your nurse if you have questions.     Bring a paper prescription for each of these medications     LORazepam 0.5 MG tablet                Primary Care Provider Office Phone # Fax #    Dickson Reich [phone] [address] [facility] [address]        Equal Access to Services     SANDRA GAGNON : Hadii virginia ku hadasho Soomaali, waaxda luqadaha, qaybta kaalmada adeegyahanh, melita shaw . So [facility] [phone].    ATENCIÓN: Si habla español, tiene a nolan disposición servicios gratuitos de asistencia lingüística. [facility] [phone].    We comply with applicable federal civil rights laws and [place] laws. We do not discriminate on the basis of race, color, national origin, age, disability, sex, sexual orientation, or gender identity.            Thank you!     Thank you for choosing [facility] AND [facility]  for your care. Our goal is always to provide you with excellent care. Hearing back from our patients is one way we can continue to improve our services. Please take a few minutes to complete the written survey that you may receive in the mail after your visit with us. Thank  you!             Your Updated Medication List - Protect others around you: Learn how to safely use, store and throw away your medicines at www.disposemymeds.org.          This list is accurate as of 4/11/18  4:54 PM.  Always use your most recent med list.                   Brand Name Dispense Instructions for use Diagnosis    * ACYCLOVIR PO      Take 400 mg by mouth 2 times daily Oncology to decide when stop date will be        * acyclovir 400 MG tablet    ZOVIRAX    60 tablet    Take 1 tablet (400 mg) by mouth 2 times daily Viral Prophylaxis.    Multiple myeloma not having achieved remission (H)       amLODIPine 10 MG tablet    NORVASC    30 tablet    Take 1 tablet (10 mg) by mouth daily    Essential hypertension       * B-D U/F 31G X 8 MM   Generic drug:  insulin pen needle           * B-D U/F 31G X 8 MM   Generic drug:  insulin pen needle     500 each    USE 5 PEN NEEDLES PER DAY OR AS DIRECTED    Type 2 diabetes mellitus with stage 3 chronic kidney disease, with long-term current use of insulin (H)       Blood Glucose Monitoring Suppl Misc      3 times daily (before meals)        dexamethasone 4 MG tablet    DECADRON    30 tablet    Take 10 tablets (40 mg) by mouth on Days 1, 8, and 15.    Multiple myeloma not having achieved remission (H)       ferrous sulfate 325 (65 Fe) MG tablet    IRON     Take 325 mg by mouth daily (with breakfast)        * FREESTYLE LITE test strip   Generic drug:  blood glucose monitoring     200 strip    USE 1 STRIP TO TEST TWICE DAILY.    Uncontrolled type 1 diabetes mellitus with stage 3 chronic kidney disease (H)       * FREESTYLE LITE test strip   Generic drug:  blood glucose monitoring     400 strip    USE TO TEST FOUR TIMES DAILY    Uncontrolled type 1 diabetes mellitus with stage 3 chronic kidney disease (H)       furosemide 20 MG tablet    LASIX    90 tablet    Take 1 tablet (20 mg) by mouth daily    Multiple myeloma not having achieved remission (H)       gemfibrozil 600 MG  tablet    LOPID    180 tablet    TAKE 1 TABLET BY MOUTH TWICE DAILY    Hyperlipidemia       * INSULIN ASPART SC      Inject 13 Units Subcutaneous every morning        * INSULIN ASPART SC      Inject 11 Units Subcutaneous daily (before lunch)        * INSULIN ASPART SC      Inject Subcutaneous At Bedtime Inject 4 unit subcutaneously at bedtime for Diabetes II Ok to use Humalog insulin with same order details        * INSULIN ASPART SC      Inject 9 Units Subcutaneous daily (with dinner)        * INSULIN ASPART SC      Inject Subcutaneous 4 times daily Sliding scale: 140-175 1 unit 176-210 2 units 211-245 3 units 246-280 4 units 281-315 5 units 316-350 6 units 351-385 7 units 386-420 8 units >420 9 units        insulin detemir 100 UNIT/ML injection    LEVEMIR FLEXPEN/FLEXTOUCH    15 mL    Inject 10 Units Subcutaneous every morning (before breakfast)    Type 2 diabetes mellitus with diabetic chronic kidney disease, unspecified CKD stage, unspecified long term insulin use status (H), Uncontrolled type 2 diabetes mellitus with hyperglycemia, unspecified long term insulin use status (H), Type 2 diabetes mellitus with stage 3 chronic kidney disease, with long-term current use of insulin (H)       levothyroxine 25 MCG tablet    SYNTHROID/LEVOTHROID    90 tablet    TAKE 1 TABLET BY MOUTH EVERY DAY    Acquired hypothyroidism       LISINOPRIL PO      Take 15 mg by mouth daily        LORazepam 0.5 MG tablet    ATIVAN    30 tablet    Take 1 tablet (0.5 mg) by mouth every 4 hours as needed (Anxiety, Nausea/Vomiting or Sleep)    Multiple myeloma not having achieved remission (H)       metoprolol succinate 50 MG 24 hr tablet    TOPROL-XL    30 tablet    Take 1 tablet (50 mg) by mouth daily    Atrial fibrillation with rapid ventricular response (H)       nystatin 404342 UNIT/GM Powd    MYCOSTATIN     Apply topically 4 times daily Also taking prn. Apply topically to groin area for yeast        polyethylene glycol Packet     MIRALAX/GLYCOLAX    7 packet    Take 17 g by mouth 2 times daily as needed (constipation)    Slow transit constipation       potassium chloride 10 MEQ tablet    K-TAB,KLOR-CON     Take 10 mEq by mouth daily        prochlorperazine 10 MG tablet    COMPAZINE    30 tablet    Take 1 tablet (10 mg) by mouth every 6 hours as needed (Nausea/Vomiting)    Multiple myeloma not having achieved remission (H)       Selenium 200 MCG Tabs      Take 100 mcg by mouth daily. Pt takes one half tab of the 200 mcg daily        VITAMIN C PO      Take 500 mg by mouth daily        VITAMIN D (CHOLECALCIFEROL) PO      Take 1,000 Units by mouth        * Notice:  This list has 11 medication(s) that are the same as other medications prescribed for you. Read the directions carefully, and ask your doctor or other care provider to review them with you.

## 2018-04-18 NOTE — MR AVS SNAPSHOT
After Visit Summary   4/18/2018    Roc Parkinson    MRN: [id number]           Patient Information     Date Of Birth          7/7/1926        Visit Information        Provider Department      4/18/2018 11:30 AM  INFUSION CHAIR 14 [facility]        Today's Diagnoses     Multiple myeloma not having achieved remission (H)    -  1       Follow-ups after your visit        Your next 10 appointments already scheduled     Apr 25, 2018  8:00 AM CDT   Level 6 with SH INFUSION CHAIR 6   [facility] ([facility])    [facility]  [address]  [place] [address]   [phone]            May 09, 2018  8:00 AM CDT   Level 6 with SH INFUSION CHAIR 5   [facility] ([facility])    [facility]  [address]  [place] [address]   [phone]            May 16, 2018  8:00 AM CDT   Level 6 with SH INFUSION CHAIR 6   [facility] ([facility])    [facility]  [address]  [place] [address]   [phone]              Who to contact     If you have questions or need follow up information about today's clinic visit or your schedule please contact [facility] directly at [phone].  Normal or non-critical lab and imaging results will be communicated to you by MyChart, letter or phone within 4 business days after the clinic has received the results. If you do not hear from us within 7 days, please contact the clinic through MyChart or phone. If you have a critical or abnormal lab result, we will notify you by phone as soon as possible.  Submit refill requests through [employer] or call your pharmacy and they will forward the refill request to us. Please allow 3 business days for your refill to be completed.           "Additional Information About Your Visit        MyChart Information     "[employer] lets you send messages to your doctor, view your test results, renew your prescriptions, schedule appointments and more. To sign up, go to [email or web address]/"[email or web address]" . Click on \"Log in\" on the left side of the screen, which will take you to the Welcome page. Then click on \"Sign up Now\" on the right side of the page.     You will be asked to enter the access code listed below, as well as some personal information. Please follow the directions to create your username and password.     Your access code is: [email or web address]  Expires: 2018 11:49 AM     Your access code will  in 90 days. If you need help or a new code, please call your [facility] clinic or [phone].        Care EveryWhere ID     This is your Care EveryWhere ID. This could be used by other organizations to access your [facility] medical records  [id number]        Your Vitals Were     Height BMI (Body Mass Index)                1.727 m (5' 7.99\") 31.05 kg/m2           Blood Pressure from Last 3 Encounters:   18 146/76   18 131/67   18 120/72    Weight from Last 3 Encounters:   18 92.6 kg (204 lb 2.3 oz)   18 92.6 kg (204 lb 3.2 oz)   18 92.4 kg (203 lb 9.6 oz)              Today, you had the following     No orders found for display       Primary Care Provider Office Phone # Fax #    Dickson Reich [phone] [address] [facility] [address]        Equal Access to Services     [facility]: Hadii virginia Driver, waaxda luqadaha, qaybta kaalmada paddy, melita rodriguez. So [facility] [phone].    ATENCIÓN: Si habla español, tiene a nolan disposición servicios gratuitos de asistencia lingüística. Llame al [phone].    We comply with applicable federal civil rights laws and [place] laws. We do not discriminate on the basis of race, color, national origin, age, " disability, sex, sexual orientation, or gender identity.            Thank you!     Thank you for choosing [facility]  for your care. Our goal is always to provide you with excellent care. Hearing back from our patients is one way we can continue to improve our services. Please take a few minutes to complete the written survey that you may receive in the mail after your visit with us. Thank you!             Your Updated Medication List - Protect others around you: Learn how to safely use, store and throw away your medicines at www.disposemymeds.org.          This list is accurate as of 4/18/18 11:41 AM.  Always use your most recent med list.                   Brand Name Dispense Instructions for use Diagnosis    * ACYCLOVIR PO      Take 400 mg by mouth 2 times daily Oncology to decide when stop date will be        * acyclovir 400 MG tablet    ZOVIRAX    60 tablet    Take 1 tablet (400 mg) by mouth 2 times daily Viral Prophylaxis.    Multiple myeloma not having achieved remission (H)       amLODIPine 10 MG tablet    NORVASC    30 tablet    Take 1 tablet (10 mg) by mouth daily    Essential hypertension       * B-D U/F 31G X 8 MM   Generic drug:  insulin pen needle           * B-D U/F 31G X 8 MM   Generic drug:  insulin pen needle     500 each    USE 5 PEN NEEDLES PER DAY OR AS DIRECTED    Type 2 diabetes mellitus with stage 3 chronic kidney disease, with long-term current use of insulin (H)       Blood Glucose Monitoring Suppl Misc      3 times daily (before meals)        dexamethasone 4 MG tablet    DECADRON    30 tablet    Take 10 tablets (40 mg) by mouth on Days 1, 8, and 15.    Multiple myeloma not having achieved remission (H)       ferrous sulfate 325 (65 Fe) MG tablet    IRON     Take 325 mg by mouth daily (with breakfast)        * FREESTYLE LITE test strip   Generic drug:  blood glucose monitoring     200 strip    USE 1 STRIP TO TEST TWICE DAILY.    Uncontrolled type 1 diabetes  mellitus with stage 3 chronic kidney disease (H)       * FREESTYLE LITE test strip   Generic drug:  blood glucose monitoring     400 strip    USE TO TEST FOUR TIMES DAILY    Uncontrolled type 1 diabetes mellitus with stage 3 chronic kidney disease (H)       furosemide 20 MG tablet    LASIX    90 tablet    Take 1 tablet (20 mg) by mouth daily    Multiple myeloma not having achieved remission (H)       gemfibrozil 600 MG tablet    LOPID    180 tablet    TAKE 1 TABLET BY MOUTH TWICE DAILY    Hyperlipidemia       * INSULIN ASPART SC      Inject 13 Units Subcutaneous every morning        * INSULIN ASPART SC      Inject 11 Units Subcutaneous daily (before lunch)        * INSULIN ASPART SC      Inject Subcutaneous At Bedtime Inject 4 unit subcutaneously at bedtime for Diabetes II Ok to use Humalog insulin with same order details        * INSULIN ASPART SC      Inject 9 Units Subcutaneous daily (with dinner)        * INSULIN ASPART SC      Inject Subcutaneous 4 times daily Sliding scale: 140-175 1 unit 176-210 2 units 211-245 3 units 246-280 4 units 281-315 5 units 316-350 6 units 351-385 7 units 386-420 8 units >420 9 units        insulin detemir 100 UNIT/ML injection    LEVEMIR FLEXPEN/FLEXTOUCH    15 mL    Inject 10 Units Subcutaneous every morning (before breakfast)    Type 2 diabetes mellitus with diabetic chronic kidney disease, unspecified CKD stage, unspecified long term insulin use status (H), Uncontrolled type 2 diabetes mellitus with hyperglycemia, unspecified long term insulin use status (H), Type 2 diabetes mellitus with stage 3 chronic kidney disease, with long-term current use of insulin (H)       levothyroxine 25 MCG tablet    SYNTHROID/LEVOTHROID    90 tablet    TAKE 1 TABLET BY MOUTH EVERY DAY    Acquired hypothyroidism       * LISINOPRIL PO      Take 15 mg by mouth daily        * lisinopril 30 MG tablet    PRINIVIL,ZESTRIL    90 tablet    TAKE 1 TABLET BY MOUTH EVERY DAY    Essential hypertension, benign        LORazepam 0.5 MG tablet    ATIVAN    30 tablet    Take 1 tablet (0.5 mg) by mouth every 4 hours as needed (Anxiety, Nausea/Vomiting or Sleep)    Multiple myeloma not having achieved remission (H)       metoprolol succinate 50 MG 24 hr tablet    TOPROL-XL    30 tablet    Take 1 tablet (50 mg) by mouth daily    Atrial fibrillation with rapid ventricular response (H)       nystatin [id number] UNIT/GM Powd    MYCOSTATIN     Apply topically 4 times daily Also taking prn. Apply topically to groin area for yeast        polyethylene glycol Packet    MIRALAX/GLYCOLAX    7 packet    Take 17 g by mouth 2 times daily as needed (constipation)    Slow transit constipation       potassium chloride 10 MEQ tablet    K-TAB,KLOR-CON     Take 10 mEq by mouth daily        prochlorperazine 10 MG tablet    COMPAZINE    30 tablet    Take 1 tablet (10 mg) by mouth every 6 hours as needed (Nausea/Vomiting)    Multiple myeloma not having achieved remission (H)       Selenium 200 MCG Tabs      Take 100 mcg by mouth daily. Pt takes one half tab of the 200 mcg daily        VITAMIN C PO      Take 500 mg by mouth daily        VITAMIN D (CHOLECALCIFEROL) PO      Take 1,000 Units by mouth        * Notice:  This list has 13 medication(s) that are the same as other medications prescribed for you. Read the directions carefully, and ask your doctor or other care provider to review them with you.

## 2018-04-18 NOTE — LETTER
4/18/2018         RE: Roc Parkinson  [address]   [facility] [address]        Dear Colleague,    Thank you for referring your patient, Roc Parkinson, to the [facility]. Please see a copy of my visit note below.    Medical Assistant Note:  Roc Parkinson presents today for lab only.    Patient seen by provider today: Yes.   present during visit today: Not Applicable.    Concerns: No Concerns.    Procedure:  Lab draw site: LAC, Needle type: BF, Gauge: 21.    Post Assessment:  Labs drawn without difficulty: Yes.    Discharge Plan:  Departure Mode: Ambulatory.    Face to Face Time: 5 minutes.    Diana Mosley MA              Again, thank you for allowing me to participate in the care of your patient.        Sincerely,        Oncology Nurse

## 2018-04-18 NOTE — PROGRESS NOTES
Infusion Nursing Note:  Roc Parkinson presents today for C1 D8 Velcade.    Patient seen by provider today: No   present during visit today: Not Applicable.    Note: Patient reports mild swelling in his fingers which he states is related to velcade, otherwise no new concerns. Patient verbalizes understanding of correct dosing/schedule of oral dexamethasone as ordered, denies need for refill at this time.      Intravenous Access:  No Intravenous access/labs at this visit.    Treatment Conditions:  Lab Results   Component Value Date    HGB 9.6 04/18/2018     Lab Results   Component Value Date    WBC 4.0 04/18/2018      Lab Results   Component Value Date    ANEU 2.8 04/18/2018     Lab Results   Component Value Date    PLT 99 04/18/2018      Results reviewed, labs MET treatment parameters, ok to proceed with treatment.      Post Infusion Assessment:  Patient tolerated injection without incident. Velcade given SQ in right mid abdomen.  Site patent and intact, free from redness, edema or discomfort.    Discharge Plan:   Discharge instructions reviewed with: Patient.  Patient and/or family verbalized understanding of discharge instructions and all questions answered.  Copy of AVS reviewed with patient and/or family.  Patient will return 4/25 for next appointment.  Patient discharged in stable condition accompanied by: son.  Departure Mode: Ambulatory.    Eliana Carpenter RN

## 2018-04-18 NOTE — PROGRESS NOTES
Medical Assistant Note:  Roc Parkinson presents today for lab only.    Patient seen by provider today: Yes.   present during visit today: Not Applicable.    Concerns: No Concerns.    Procedure:  Lab draw site: LAC, Needle type: BF, Gauge: 21.    Post Assessment:  Labs drawn without difficulty: Yes.    Discharge Plan:  Departure Mode: Ambulatory.    Face to Face Time: 5 minutes.    Diana Mosley MA

## 2018-04-18 NOTE — MR AVS SNAPSHOT
After Visit Summary   4/18/2018    Roc Parkinson    MRN: [id number]           Patient Information     Date Of Birth          7/7/1926        Visit Information        Provider Department      4/18/2018 10:15 AM Nurse, [facility]        Today's Diagnoses     Multiple myeloma not having achieved remission (H)    -  1       Follow-ups after your visit        Your next 10 appointments already scheduled     Apr 18, 2018 10:45 AM CDT   Return Visit with Gretel Quinn MD   [facility] ([facility])    [facility]  [address]  [place] [address]   [phone]            Apr 18, 2018 11:30 AM CDT   Level 6 with  INFUSION CHAIR 14   [facility] ([facility])    [facility]  [address]  [place] [address]   [phone]            Apr 25, 2018  8:00 AM CDT   Level 6 with  INFUSION CHAIR 6   [facility] ([facility])    [facility]  [address]  [place] [address]   [phone]            May 09, 2018  8:00 AM CDT   Level 6 with  INFUSION CHAIR 5   [facility] ([facility])    [facility]  [address]  [place] [address]   [phone]            May 16, 2018  8:00 AM CDT   Level 6 with  INFUSION CHAIR 6   [facility] ([facility])    [facility]  [address]  [place] [address]   [phone]              Who to contact     If you have questions or need follow up information about today's clinic visit or your schedule please contact [facility] directly at [phone].  Normal or non-critical lab and imaging results will be communicated to you by MyChart, letter or phone within 4  "business days after the clinic has received the results. If you do not hear from us within 7 days, please contact the clinic through [employer] or phone. If you have a critical or abnormal lab result, we will notify you by phone as soon as possible.  Submit refill requests through [employer] or call your pharmacy and they will forward the refill request to us. Please allow 3 business days for your refill to be completed.          Additional Information About Your Visit        [employer] Information     [employer] lets you send messages to your doctor, view your test results, renew your prescriptions, schedule appointments and more. To sign up, go to [email or web address] . Click on \"Log in\" on the left side of the screen, which will take you to the Welcome page. Then click on \"Sign up Now\" on the right side of the page.     You will be asked to enter the access code listed below, as well as some personal information. Please follow the directions to create your username and password.     Your access code is: [email or web address]  Expires: 2018 11:49 AM     Your access code will  in 90 days. If you need help or a new code, please call your [place] clinic or [phone].        Care EveryWhere ID     This is your Care EveryWhere ID. This could be used by other organizations to access your [place] medical records  [id number]         Blood Pressure from Last 3 Encounters:   18 131/67   18 120/72   18 129/69    Weight from Last 3 Encounters:   18 92.4 kg (203 lb 9.6 oz)   18 92.4 kg (203 lb 9.6 oz)   18 91.2 kg (201 lb)              We Performed the Following     CBC with platelets differential        Primary Care Provider Office Phone # Fax #    Dickson Reich [phone] [address] [facility] [address]        Equal Access to Services     ELIE GAGNON AH: Hadii virginia Driver, osorioda marilou, qaybta melita mchugh " constantine christopheryaelcarrillo doLynnaajanny ah. So [facility] [phone].    ATENCIÓN: Si renaldo stephens, tiene a nolan disposición servicios gratuitos de asistencia lingüística. Eric al [phone].    We comply with applicable federal civil rights laws and [place] laws. We do not discriminate on the basis of race, color, national origin, age, disability, sex, sexual orientation, or gender identity.            Thank you!     Thank you for choosing [facility]  for your care. Our goal is always to provide you with excellent care. Hearing back from our patients is one way we can continue to improve our services. Please take a few minutes to complete the written survey that you may receive in the mail after your visit with us. Thank you!             Your Updated Medication List - Protect others around you: Learn how to safely use, store and throw away your medicines at www.disposemymeds.org.          This list is accurate as of 4/18/18 10:24 AM.  Always use your most recent med list.                   Brand Name Dispense Instructions for use Diagnosis    * ACYCLOVIR PO      Take 400 mg by mouth 2 times daily Oncology to decide when stop date will be        * acyclovir 400 MG tablet    ZOVIRAX    60 tablet    Take 1 tablet (400 mg) by mouth 2 times daily Viral Prophylaxis.    Multiple myeloma not having achieved remission (H)       amLODIPine 10 MG tablet    NORVASC    30 tablet    Take 1 tablet (10 mg) by mouth daily    Essential hypertension       * B-D U/F 31G X 8 MM   Generic drug:  insulin pen needle           * B-D U/F 31G X 8 MM   Generic drug:  insulin pen needle     500 each    USE 5 PEN NEEDLES PER DAY OR AS DIRECTED    Type 2 diabetes mellitus with stage 3 chronic kidney disease, with long-term current use of insulin (H)       Blood Glucose Monitoring Suppl Misc      3 times daily (before meals)        dexamethasone 4 MG tablet    DECADRON    30 tablet    Take 10 tablets (40 mg) by mouth on Days 1, 8, and 15.    Multiple  myeloma not having achieved remission (H)       ferrous sulfate 325 (65 Fe) MG tablet    IRON     Take 325 mg by mouth daily (with breakfast)        * FREESTYLE LITE test strip   Generic drug:  blood glucose monitoring     200 strip    USE 1 STRIP TO TEST TWICE DAILY.    Uncontrolled type 1 diabetes mellitus with stage 3 chronic kidney disease (H)       * FREESTYLE LITE test strip   Generic drug:  blood glucose monitoring     400 strip    USE TO TEST FOUR TIMES DAILY    Uncontrolled type 1 diabetes mellitus with stage 3 chronic kidney disease (H)       furosemide 20 MG tablet    LASIX    90 tablet    Take 1 tablet (20 mg) by mouth daily    Multiple myeloma not having achieved remission (H)       gemfibrozil 600 MG tablet    LOPID    180 tablet    TAKE 1 TABLET BY MOUTH TWICE DAILY    Hyperlipidemia       * INSULIN ASPART SC      Inject 13 Units Subcutaneous every morning        * INSULIN ASPART SC      Inject 11 Units Subcutaneous daily (before lunch)        * INSULIN ASPART SC      Inject Subcutaneous At Bedtime Inject 4 unit subcutaneously at bedtime for Diabetes II Ok to use Humalog insulin with same order details        * INSULIN ASPART SC      Inject 9 Units Subcutaneous daily (with dinner)        * INSULIN ASPART SC      Inject Subcutaneous 4 times daily Sliding scale: 140-175 1 unit 176-210 2 units 211-245 3 units 246-280 4 units 281-315 5 units 316-350 6 units 351-385 7 units 386-420 8 units >420 9 units        insulin detemir 100 UNIT/ML injection    LEVEMIR FLEXPEN/FLEXTOUCH    15 mL    Inject 10 Units Subcutaneous every morning (before breakfast)    Type 2 diabetes mellitus with diabetic chronic kidney disease, unspecified CKD stage, unspecified long term insulin use status (H), Uncontrolled type 2 diabetes mellitus with hyperglycemia, unspecified long term insulin use status (H), Type 2 diabetes mellitus with stage 3 chronic kidney disease, with long-term current use of insulin (H)       levothyroxine 25  MCG tablet    SYNTHROID/LEVOTHROID    90 tablet    TAKE 1 TABLET BY MOUTH EVERY DAY    Acquired hypothyroidism       * LISINOPRIL PO      Take 15 mg by mouth daily        * lisinopril 30 MG tablet    PRINIVIL,ZESTRIL    90 tablet    TAKE 1 TABLET BY MOUTH EVERY DAY    Essential hypertension, benign       LORazepam 0.5 MG tablet    ATIVAN    30 tablet    Take 1 tablet (0.5 mg) by mouth every 4 hours as needed (Anxiety, Nausea/Vomiting or Sleep)    Multiple myeloma not having achieved remission (H)       metoprolol succinate 50 MG 24 hr tablet    TOPROL-XL    30 tablet    Take 1 tablet (50 mg) by mouth daily    Atrial fibrillation with rapid ventricular response (H)       nystatin 705009 UNIT/GM Powd    MYCOSTATIN     Apply topically 4 times daily Also taking prn. Apply topically to groin area for yeast        polyethylene glycol Packet    MIRALAX/GLYCOLAX    7 packet    Take 17 g by mouth 2 times daily as needed (constipation)    Slow transit constipation       potassium chloride 10 MEQ tablet    K-TAB,KLOR-CON     Take 10 mEq by mouth daily        prochlorperazine 10 MG tablet    COMPAZINE    30 tablet    Take 1 tablet (10 mg) by mouth every 6 hours as needed (Nausea/Vomiting)    Multiple myeloma not having achieved remission (H)       Selenium 200 MCG Tabs      Take 100 mcg by mouth daily. Pt takes one half tab of the 200 mcg daily        VITAMIN C PO      Take 500 mg by mouth daily        VITAMIN D (CHOLECALCIFEROL) PO      Take 1,000 Units by mouth        * Notice:  This list has 13 medication(s) that are the same as other medications prescribed for you. Read the directions carefully, and ask your doctor or other care provider to review them with you.

## 2018-04-23 NOTE — TELEPHONE ENCOUNTER
Called Roc just to check in on DB cares.   He has been on chemo and treatment has changed per note from MD.    Roc lets me know he was recently in the hospital, but feels good now.  He tells me he is doing well with his diabetes cares, does not have any needs or questions at this time.   A1C on 1/8/18= 6.3%    Invited Roc to let me know if there is anything I can do to support him.     Kalyn Almanzar RD, LD, CDE

## 2018-04-25 NOTE — PROGRESS NOTES
[facility]  HEMATOLOGY ONCOLOGY     DIAGNOSIS:    1- Kappa light chain IgM multiple myeloma in a 89-year-old patient.  Bone marrow biopsy on 11/17/2016 showed hypercellular bone marrow with 65% cellularity of light chain restricted plasma cells.  FISH or G-banding could not be performed due to insufficient sample.   There is a background of myelodysplastic syndrome.  The patient was initially seen in the hospital on 11/17/2016 for macrocytic anemia and pancytopenia and transfusion requirement and a bone marrow biopsy was performed.   2- History of prostate cancer s/p EBRT s/p brachytherapy in 2003 (recent PSA 0.10), superficial bladder cancer ,no recurrences since 1986     TREATMENT: 12/15/16 velcade/dex. 4/12/17 added cyclophosphamide 300 mg weekly.6/28/17 we discontinued velcade due to concern for neuropathy and continued with weekly cyclophosphamide and dexamethasone.   1/2/18 switched to Carfilzomib and dexamethasone.   3/14/18 started on Daratumumab and dexamethasone.      SUBJECTIVE:  The patient was seen as a followup today. He is tolerating treatment well. No significant neuropathy.     REVIEW OF SYSTEMS:  A complete review of systems was performed and found to be negative other than pertinent positives mentioned in history of present illness.     Past medical, social histories reviewed.    Meds- Reviewed.     PHYSICAL EXAMINATION:   VITAL SIGNS:/68  Pulse 74  Temp 98.7  F (37.1  C) (Oral)  Resp 16  Wt 92.2 kg (203 lb 3.2 oz)  SpO2 96%  BMI 30.9 kg/m2  CONSTITUTIONAL: Sitting comfortably.   HEENT: Pupils are equal. Oropharynx is clear.   NECK: No cervical or supraclavicular lymphadenopathy.   RESPIRATORY: Clear bilaterally.   CARD/VASC: S1, S2, regular.   GI: Soft, nontender, nondistended, no hepatosplenomegaly.   MUSKULOSKELETAL: Warm, well perfused.   NEUROLOGIC: Alert, awake.   INTEGUMENT: No rash.   LYMPHATICS: No edema.   PSYCH: Mood and affect was normal.      LABORATORY DATA AND IMAGING REVIEWED DURING THIS VISIT:  Recent Labs   Lab Test  04/11/18   0806  04/05/18   0835   03/26/18   0607  03/23/18   0532   01/21/18   0834  01/20/18   0220   NA  138  132*   < >  141  135   < >  140  139   POTASSIUM  4.2  4.2   < >  3.6  4.2   < >  4.1  4.1   CHLORIDE  107  101   < >  108  106   < >  108  107   CO2  23  24   < >  25  20   < >  22  22   ANIONGAP  8  7   < >  8  9   < >  10  10   BUN  24  39*   < >  22  36*   < >  29  45*   CR  0.94  1.26*   < >  0.91  1.06   < >  1.02  1.45*   GLC  176*  176*   < >  134*  446*   < >  183*  75   MICHELLE  8.9  9.5   < >  8.7  8.0*   < >  8.8  9.1   MAG   --    --    --   2.0  1.8   < >   --   2.2   PHOS   --    --    --    --    --    --   2.9  2.2*    < > = values in this interval not displayed.     Recent Labs   Lab Test  04/18/18   1020  04/11/18   0806  04/05/18   0835   WBC  4.0  4.0  5.2   HGB  9.6*  9.3*  9.8*   PLT  99*  157  123*   MCV  110*  110*  106*   NEUTROPHIL  69.8  64.6  73.7     Recent Labs   Lab Test  04/11/18   0806  04/05/18   0835  03/22/18   1335   11/17/16   0938   BILITOTAL  0.5  0.5  0.3   < >   --    ALKPHOS  80  77  86   < >   --    ALT  11  12  15   < >   --    AST  13  7  16   < >   --    ALBUMIN  2.6*  2.7*  2.7*   < >   --    LDH   --    --    --    --   110    < > = values in this interval not displayed.   Results for JACOB MEDELLIN (MRN [id number]) as of 4/25/2018 08:51   Ref. Range 2/7/2018 11:00 3/7/2018 11:30 4/11/2018 08:06   Gamma Fraction Latest Ref Range: 0.7 - 1.6 g/dL 2.0 (H) 2.3 (H) 2.4 (H)   IGA Latest Ref Range: 70 - 380 mg/dL 12 (L) 12 (L) 13 (L)   IGG Latest Ref Range: 695 - 1620 mg/dL 258 (L) 252 (L) 261 (L)   IGM Latest Ref Range: 60 - 265 mg/dL 2952 (H) 3420 (H) 3470 (H)   Immunofixation ELP Unknown (Note) (Note) (Note)   Kappa Free Lt Chain Latest Ref Range: 0.33 - 1.94 mg/dL 81.50 (H) 72.00 (H) 110.25 (H)   Kappa Lambda Ratio Latest Ref Range: 0.26 - 1.65  123.48 (H) 248.28 (H) 125.28  (H)   Lambda Free Lt Chain Latest Ref Range: 0.57 - 2.63 mg/dL 0.66 0.29 (L) 0.88   Monoclonal Peak Latest Ref Range: 0.0 g/dL 1.8 (H) 2.0 (H) 2.0 (H)     ECOG PS: 1    ASSESSMENT:  This is a 89-year-old gentleman who has kappa light chain multiple myeloma with hypercellular marrow with 65% of cells are light chain restricted plasma cells.  He had severe pancytopenia and has needed a transfusion in the hospital.  He did not have hypercalcemia and his renal function was normal. He has a background of myelodysplastic syndrome on his bone marrow biopsy; however, majority of the cell population was monoclonal plasma cell population.   He was started on treatment due to cytopenia.   In 4/2017 his light chain levels were getting worse. M spike was stable. We added cyclophosphamide to the regimen. In 6/2017 discontinued velcade for concern of development of neuropathy, in hindsight the pain appeared to be related to arthritis.       He was switched to Daratumumab and Dex on 3/14/18. The first dose resulted in reaction, he was admitted to the hospital with A fib and had complications/pneumonia.     He is on Aranesp 300 mcg SQ every three weeks.  He is on Velcade and dexamethasone   - Labs were reviewed with the patient, he had clear progression on his labs from 4/11. We will continue Velcade and dexamethasone and will assess response each cycle.     PLAN:   1. Continue Velcade/dexamethasone, treatment today.  2. Zometa every 12 weeks  3. Continue Aranesp 300 mcg SQ every three weeks  4. RTC MD 6 weeks    ALISON JON MD    4/11/2018

## 2018-04-25 NOTE — PROGRESS NOTES
"Oncology Rooming Note    April 25, 2018 8:48 AM   Roc Parkinson is a 89 year old male who presents for:    Chief Complaint   Patient presents with     Oncology Clinic Visit     MDS (myelodysplastic syndrome) (H)     Initial Vitals: /68  Pulse 74  Temp 98.7  F (37.1  C) (Oral)  Resp 16  Wt 92.2 kg (203 lb 3.2 oz)  SpO2 96%  BMI 30.9 kg/m2 Estimated body mass index is 30.9 kg/(m^2) as calculated from the following:    Height as of 4/18/18: 1.727 m (5' 7.99\").    Weight as of this encounter: 92.2 kg (203 lb 3.2 oz). Body surface area is 2.1 meters squared.  Data Unavailable Comment: Data Unavailable   No LMP for male patient.  Allergies reviewed: Yes  Medications reviewed: Yes    Medications: Medication refills not needed today.  Pharmacy name entered into [employer]:    [facility] [place], [place] - [address] [place] [address] DRUG STORE [address] [place] [address] [address] [facility]    Clinical concerns: no     5 minutes for nursing intake (face to face time)           Winsome Diaz MA              "

## 2018-04-25 NOTE — TELEPHONE ENCOUNTER
The patient should take 2 of his 25 mcg Synthroid tablets on Mondays and Fridays with 1 tablet the rest of the week and we will repeat his thyroid tests when he comes in in May.

## 2018-04-25 NOTE — TELEPHONE ENCOUNTER
Please call the patient and find out what he is taking for his thyroid medication.  His potassium looks fine so he can stay off of the supplement we can check that again in about a month.

## 2018-04-25 NOTE — LETTER
"    4/25/2018         RE: Roc Parkinson  [address]   [facility] [address]        Dear Colleague,    Thank you for referring your patient, Roc Parkinson, to the [facility]. Please see a copy of my visit note below.    Oncology Rooming Note    April 25, 2018 8:48 AM   Roc Parkinson is a 89 year old male who presents for:    Chief Complaint   Patient presents with     Oncology Clinic Visit     MDS (myelodysplastic syndrome) (H)     Initial Vitals: /68  Pulse 74  Temp 98.7  F (37.1  C) (Oral)  Resp 16  Wt 92.2 kg (203 lb 3.2 oz)  SpO2 96%  BMI 30.9 kg/m2 Estimated body mass index is 30.9 kg/(m^2) as calculated from the following:    Height as of 4/18/18: 1.727 m (5' 7.99\").    Weight as of this encounter: 92.2 kg (203 lb 3.2 oz). Body surface area is 2.1 meters squared.  Data Unavailable Comment: Data Unavailable   No LMP for male patient.  Allergies reviewed: Yes  Medications reviewed: Yes    Medications: Medication refills not needed today.  Pharmacy name entered into [employer]:    [facility] - [place], [place] - [address] [facility] [id number] [place] [address] [address] AT [facility]    Clinical concerns: no     5 minutes for nursing intake (face to face time)           Winsome Diaz MA                [facility]  HEMATOLOGY ONCOLOGY     DIAGNOSIS:    1- Kappa light chain IgM multiple myeloma in a 89-year-old patient.  Bone marrow biopsy on 11/17/2016 showed hypercellular bone marrow with 65% cellularity of light chain restricted plasma cells.  FISH or G-banding could not be performed due to insufficient sample.   There is a background of myelodysplastic syndrome.  The patient was initially seen in the hospital on 11/17/2016 for macrocytic anemia and pancytopenia and transfusion requirement and a bone marrow biopsy was performed.   2- History of prostate cancer s/p EBRT s/p brachytherapy in " 2003 (recent PSA 0.10), superficial bladder cancer ,no recurrences since 1986     TREATMENT: 12/15/16 velcade/dex. 4/12/17 added cyclophosphamide 300 mg weekly.6/28/17 we discontinued velcade due to concern for neuropathy and continued with weekly cyclophosphamide and dexamethasone.   1/2/18 switched to Carfilzomib and dexamethasone.   3/14/18 started on Daratumumab and dexamethasone.      SUBJECTIVE:  The patient was seen as a followup today. He is tolerating treatment well. No significant neuropathy.     REVIEW OF SYSTEMS:  A complete review of systems was performed and found to be negative other than pertinent positives mentioned in history of present illness.     Past medical, social histories reviewed.    Meds- Reviewed.     PHYSICAL EXAMINATION:   VITAL SIGNS:/68  Pulse 74  Temp 98.7  F (37.1  C) (Oral)  Resp 16  Wt 92.2 kg (203 lb 3.2 oz)  SpO2 96%  BMI 30.9 kg/m2  CONSTITUTIONAL: Sitting comfortably.   HEENT: Pupils are equal. Oropharynx is clear.   NECK: No cervical or supraclavicular lymphadenopathy.   RESPIRATORY: Clear bilaterally.   CARD/VASC: S1, S2, regular.   GI: Soft, nontender, nondistended, no hepatosplenomegaly.   MUSKULOSKELETAL: Warm, well perfused.   NEUROLOGIC: Alert, awake.   INTEGUMENT: No rash.   LYMPHATICS: No edema.   PSYCH: Mood and affect was normal.     LABORATORY DATA AND IMAGING REVIEWED DURING THIS VISIT:  Recent Labs   Lab Test  04/11/18   0806  04/05/18   0835   03/26/18   0607  03/23/18   0532   01/21/18   0834  01/20/18   0220   NA  138  132*   < >  141  135   < >  140  139   POTASSIUM  4.2  4.2   < >  3.6  4.2   < >  4.1  4.1   CHLORIDE  107  101   < >  108  106   < >  108  107   CO2  23  24   < >  25  20   < >  22  22   ANIONGAP  8  7   < >  8  9   < >  10  10   BUN  24  39*   < >  22  36*   < >  29  45*   CR  0.94  1.26*   < >  0.91  1.06   < >  1.02  1.45*   GLC  176*  176*   < >  134*  446*   < >  183*  75   MICHELLE  8.9  9.5   < >  8.7  8.0*   < >  8.8  9.1   MAG    --    --    --   2.0  1.8   < >   --   2.2   PHOS   --    --    --    --    --    --   2.9  2.2*    < > = values in this interval not displayed.     Recent Labs   Lab Test  04/18/18   1020  04/11/18   0806  04/05/18   0835   WBC  4.0  4.0  5.2   HGB  9.6*  9.3*  9.8*   PLT  99*  157  123*   MCV  110*  110*  106*   NEUTROPHIL  69.8  64.6  73.7     Recent Labs   Lab Test  04/11/18   0806  04/05/18   0835  03/22/18   1335   11/17/16   0938   BILITOTAL  0.5  0.5  0.3   < >   --    ALKPHOS  80  77  86   < >   --    ALT  11  12  15   < >   --    AST  13  7  16   < >   --    ALBUMIN  2.6*  2.7*  2.7*   < >   --    LDH   --    --    --    --   110    < > = values in this interval not displayed.   Results for JACOB MEDELLIN (MRN [id number]) as of 4/25/2018 08:51   Ref. Range 2/7/2018 11:00 3/7/2018 11:30 4/11/2018 08:06   Gamma Fraction Latest Ref Range: 0.7 - 1.6 g/dL 2.0 (H) 2.3 (H) 2.4 (H)   IGA Latest Ref Range: 70 - 380 mg/dL 12 (L) 12 (L) 13 (L)   IGG Latest Ref Range: 695 - 1620 mg/dL 258 (L) 252 (L) 261 (L)   IGM Latest Ref Range: 60 - 265 mg/dL 2952 (H) 3420 (H) 3470 (H)   Immunofixation ELP Unknown (Note) (Note) (Note)   Kappa Free Lt Chain Latest Ref Range: 0.33 - 1.94 mg/dL 81.50 (H) 72.00 (H) 110.25 (H)   Kappa Lambda Ratio Latest Ref Range: 0.26 - 1.65  123.48 (H) 248.28 (H) 125.28 (H)   Lambda Free Lt Chain Latest Ref Range: 0.57 - 2.63 mg/dL 0.66 0.29 (L) 0.88   Monoclonal Peak Latest Ref Range: 0.0 g/dL 1.8 (H) 2.0 (H) 2.0 (H)     ECOG PS: 1    ASSESSMENT:  This is a 89-year-old gentleman who has kappa light chain multiple myeloma with hypercellular marrow with 65% of cells are light chain restricted plasma cells.  He had severe pancytopenia and has needed a transfusion in the hospital.  He did not have hypercalcemia and his renal function was normal. He has a background of myelodysplastic syndrome on his bone marrow biopsy; however, majority of the cell population was monoclonal plasma cell population.    He was started on treatment due to cytopenia.   In 4/2017 his light chain levels were getting worse. M spike was stable. We added cyclophosphamide to the regimen. In 6/2017 discontinued velcade for concern of development of neuropathy, in hindsight the pain appeared to be related to arthritis.       He was switched to Daratumumab and Dex on 3/14/18. The first dose resulted in reaction, he was admitted to the hospital with A fib and had complications/pneumonia.     He is on Aranesp 300 mcg SQ every three weeks.  He is on Velcade and dexamethasone   - Labs were reviewed with the patient, he had clear progression on his labs from 4/11. We will continue Velcade and dexamethasone and will assess response each cycle.     PLAN:   1. Continue Velcade/dexamethasone, treatment today.  2. Zometa every 12 weeks  3. Continue Aranesp 300 mcg SQ every three weeks  4. RTC MD 6 weeks    GRETEL QUINN MD    4/11/2018        Again, thank you for allowing me to participate in the care of your patient.        Sincerely,        Gretel Quinn MD

## 2018-04-25 NOTE — TELEPHONE ENCOUNTER
Nurse ,June, from infusion center called back to report the following lab results from 4-25-18 :  1.  K+ =4.5  2.  TSH = 10.08  3.  T4 = 0.81  There is a problem with Epic and labs not visible at the moment.    Please advise info for calling patient back with these results-  thanks

## 2018-04-25 NOTE — PROGRESS NOTES
Infusion Nursing Note:  Roc Parkinson presents today for D15C1 Velcade,possible Aranesp.    Patient seen by provider today: Yes:    present during visit today: Not Applicable.    Note: some med concerns today-he stopped potassium (hard to swallow) and stopped Levemir Insulin (site reaction of swelling) and his primary Dr. Reich not aware . I will call him today.    Intravenous Access:  Lab draw site right hand, Needle type butterfly, Gauge 23.  Labs drawn without difficulty.    Treatment Conditions:  Lab Results   Component Value Date    HGB 9.2 2018     Lab Results   Component Value Date    WBC 4.9 2018      Lab Results   Component Value Date    ANEU 3.9 2018     Lab Results   Component Value Date    PLT 85 2018      Lab Results   Component Value Date     2018                   Lab Results   Component Value Date    POTASSIUM 4.5 2018           Lab Results   Component Value Date    MAG 2.0 2018            Lab Results   Component Value Date    CR 0.94 2018                   Lab Results   Component Value Date    MICHELLE 8.9 2018                Lab Results   Component Value Date    BILITOTAL 0.5 2018           Lab Results   Component Value Date    ALBUMIN 2.6 2018                    Lab Results   Component Value Date    ALT 11 2018           Lab Results   Component Value Date    AST 13 2018       Results reviewed, labs MET treatment parameters, ok to proceed with treatment.  Met parameter for Aranesp today with Hgb 9.2. I called abnormal thyroid labs to  and reported reg. Patient stopping the potassium and Levimer Insulin. Also messaged  reg. Needing updated conditional blood plan that has .      Post Infusion Assessment:  Patient tolerated injection without incident.    Discharge Plan:   Discharge instructions reviewed with: Patient.  Patient and/or family verbalized understanding of  discharge instructions and all questions answered.  Copy of AVS reviewed with patient and/or family.  Patient will return 5/2 for next appointment.  Patient discharged in stable condition accompanied by: self.  Departure Mode: Ambulatory.    Effie Fuller RN

## 2018-04-25 NOTE — MR AVS SNAPSHOT
After Visit Summary   4/25/2018    Roc Parkinson    MRN: [id number]           Patient Information     Date Of Birth          7/7/1926        Visit Information        Provider Department      4/25/2018 8:00 AM SH INFUSION CHAIR 6 [facility]        Today's Diagnoses     Multiple myeloma not having achieved remission (H)    -  1    Acquired hypothyroidism        Diuretic-induced hypokalemia        MDS (myelodysplastic syndrome) (H)           Follow-ups after your visit        Your next 10 appointments already scheduled     May 02, 2018 10:00 AM CDT   Level 1 with SH INFUSION CHAIR 14   [facility] ([facility])    [facility]  [address]  [place] [address]   [phone]            May 09, 2018 10:00 AM CDT   Level 1 with SH INFUSION CHAIR 11   [facility] ([facility])    [facility]  [address]  [place] [address]   [phone]            May 09, 2018 10:45 AM CDT   Return Visit with Gretel Quinn MD   [facility] ([facility])    [facility]  [address]  [place] [address]   [phone]            May 16, 2018  9:30 AM CDT   Level 1 with SH INFUSION CHAIR 9   [facility] ([facility])    [facility]  [address]  [place] [address]   [phone]            May 23, 2018 10:00 AM CDT   Level 1 with SH INFUSION CHAIR 5   [facility] ([facility])    [facility]  [address]  [place] [address]   [phone]            May 30, 2018 10:00 AM CDT   Level 1 with SH INFUSION CHAIR 5   [facility] ([facility])    [facility]  [place]  [address]  [place] [address]   [phone]            Jun 06, 2018  9:00 AM CDT   Level 1 with SH INFUSION CHAIR 19   [facility] ([facility])    [facility] [place]  [address]  [place] [address]   [phone]            Jun 06, 2018  9:45 AM CDT   Return Visit with Gretel Quinn MD   [facility] ([facility])    [facility] [place]  [address]  [place] [address]   [phone]            Jun 13, 2018 10:00 AM CDT   Level 1 with SH INFUSION CHAIR 1   [facility] ([facility])    [facility] [place]  [address]  [place] [address]   [phone]            Jun 20, 2018 10:00 AM CDT   Level 1 with  INFUSION CHAIR 6   [facility] ([facility])    [facility]  [address]  [place] [address]   [phone]              Who to contact     If you have questions or need follow up information about today's clinic visit or your schedule please contact [facility] directly at [phone].  Normal or non-critical lab and imaging results will be communicated to you by [employer], letter or phone within 4 business days after the clinic has received the results. If you do not hear from us within 7 days, please contact the clinic through "[employer]"t or phone. If you have a critical or abnormal lab result, we will notify you by phone as soon as possible.  Submit refill requests through [employer] or call your pharmacy and they will forward the refill request to us. Please allow 3 business days for your refill to be completed.          Additional Information About Your Visit        [employer] Information     [employer] lets you send messages to your doctor, view your test results, renew your  "prescriptions, schedule appointments and more. To sign up, go to [email or web address] . Click on \"Log in\" on the left side of the screen, which will take you to the Welcome page. Then click on \"Sign up Now\" on the right side of the page.     You will be asked to enter the access code listed below, as well as some personal information. Please follow the directions to create your username and password.     Your access code is: [email or web address]  Expires: 2018 11:49 AM     Your access code will  in 90 days. If you need help or a new code, please call your [facility] clinic or [phone].        Care EveryWhere ID     This is your Care EveryWhere ID. This could be used by other organizations to access your [facility] medical records  [id number]        Your Vitals Were     Pulse Temperature Respirations Height Pulse Oximetry BMI (Body Mass Index)    74 98.7  F (37.1  C) (Oral) 16 1.727 m (5' 8\") 96% 30.9 kg/m2       Blood Pressure from Last 3 Encounters:   18 153/68   18 153/68   18 146/76    Weight from Last 3 Encounters:   18 92.2 kg (203 lb 3.2 oz)   18 92.2 kg (203 lb 3.2 oz)   18 92.6 kg (204 lb 2.3 oz)              We Performed the Following     CBC with platelets differential     Potassium     T4, free     TSH          Today's Medication Changes          These changes are accurate as of 18 11:50 AM.  If you have any questions, ask your nurse or doctor.               These medicines have changed or have updated prescriptions.        Dose/Directions    LISINOPRIL PO   This may have changed:  Another medication with the same name was removed. Continue taking this medication, and follow the directions you see here.        Dose:  15 mg   Take 15 mg by mouth daily   Refills:  0                Primary Care Provider Office Phone # Fax #    Dickson Reich [phone] [address] [facility] [address]        Equal Access to Services     " ELIE GAGNON : Hadii aad steve jori Driver, waaxda luqadaha, qaybta kaalmada paddy, melita wilton haymarge christopheryaelcarrillo shaw . So [facility] [phone].    ATENCIÓN: Si habla espdanuta, tiene a nolan disposición servicios gratuitos de asistencia lingüística. Llame al [phone].    We comply with applicable federal civil rights laws and [place] laws. We do not discriminate on the basis of race, color, national origin, age, disability, sex, sexual orientation, or gender identity.            Thank you!     Thank you for choosing [facility]  for your care. Our goal is always to provide you with excellent care. Hearing back from our patients is one way we can continue to improve our services. Please take a few minutes to complete the written survey that you may receive in the mail after your visit with us. Thank you!             Your Updated Medication List - Protect others around you: Learn how to safely use, store and throw away your medicines at www.disposemymeds.org.          This list is accurate as of 4/25/18 11:50 AM.  Always use your most recent med list.                   Brand Name Dispense Instructions for use Diagnosis    ACYCLOVIR PO      Take 400 mg by mouth 2 times daily Oncology to decide when stop date will be        amLODIPine 10 MG tablet    NORVASC    30 tablet    Take 1 tablet (10 mg) by mouth daily    Essential hypertension       B-D U/F 31G X 8 MM   Generic drug:  insulin pen needle     500 each    USE 5 PEN NEEDLES PER DAY OR AS DIRECTED    Type 2 diabetes mellitus with stage 3 chronic kidney disease, with long-term current use of insulin (H)       Blood Glucose Monitoring Suppl Misc      3 times daily (before meals)        dexamethasone 4 MG tablet    DECADRON    30 tablet    Take 10 tablets (40 mg) by mouth on Days 1, 8, and 15.    Multiple myeloma not having achieved remission (H)       ferrous sulfate 325 (65 Fe) MG tablet    IRON     Take 325 mg by mouth  daily (with breakfast)        FREESTYLE LITE test strip   Generic drug:  blood glucose monitoring     400 strip    USE TO TEST FOUR TIMES DAILY    Uncontrolled type 1 diabetes mellitus with stage 3 chronic kidney disease (H)       furosemide 20 MG tablet    LASIX    90 tablet    Take 1 tablet (20 mg) by mouth daily    Multiple myeloma not having achieved remission (H)       gemfibrozil 600 MG tablet    LOPID    180 tablet    TAKE 1 TABLET BY MOUTH TWICE DAILY    Hyperlipidemia       * INSULIN ASPART SC      Inject 13 Units Subcutaneous every morning        * INSULIN ASPART SC      Inject 11 Units Subcutaneous daily (before lunch)        * INSULIN ASPART SC      Inject Subcutaneous At Bedtime Inject 4 unit subcutaneously at bedtime for Diabetes II Ok to use Humalog insulin with same order details        * INSULIN ASPART SC      Inject 9 Units Subcutaneous daily (with dinner)        * INSULIN ASPART SC      Inject Subcutaneous 4 times daily Sliding scale: 140-175 1 unit 176-210 2 units 211-245 3 units 246-280 4 units 281-315 5 units 316-350 6 units 351-385 7 units 386-420 8 units >420 9 units        insulin detemir 100 UNIT/ML injection    LEVEMIR FLEXPEN/FLEXTOUCH    15 mL    Inject 10 Units Subcutaneous every morning (before breakfast)    Type 2 diabetes mellitus with diabetic chronic kidney disease, unspecified CKD stage, unspecified long term insulin use status (H), Uncontrolled type 2 diabetes mellitus with hyperglycemia, unspecified long term insulin use status (H), Type 2 diabetes mellitus with stage 3 chronic kidney disease, with long-term current use of insulin (H)       levothyroxine 25 MCG tablet    SYNTHROID/LEVOTHROID    90 tablet    TAKE 1 TABLET BY MOUTH EVERY DAY    Acquired hypothyroidism       LISINOPRIL PO      Take 15 mg by mouth daily        LORazepam 0.5 MG tablet    ATIVAN    30 tablet    Take 1 tablet (0.5 mg) by mouth every 4 hours as needed (Anxiety, Nausea/Vomiting or Sleep)    Multiple  myeloma not having achieved remission (H)       metoprolol succinate 50 MG 24 hr tablet    TOPROL-XL    30 tablet    Take 1 tablet (50 mg) by mouth daily    Atrial fibrillation with rapid ventricular response (H)       polyethylene glycol Packet    MIRALAX/GLYCOLAX    7 packet    Take 17 g by mouth 2 times daily as needed (constipation)    Slow transit constipation       potassium chloride 10 MEQ tablet    K-TAB,KLOR-CON     Take 10 mEq by mouth daily        prochlorperazine 10 MG tablet    COMPAZINE    30 tablet    Take 1 tablet (10 mg) by mouth every 6 hours as needed (Nausea/Vomiting)    Multiple myeloma not having achieved remission (H)       Selenium 200 MCG Tabs      Take 100 mcg by mouth daily. Pt takes one half tab of the 200 mcg daily        VITAMIN C PO      Take 500 mg by mouth daily        VITAMIN D (CHOLECALCIFEROL) PO      Take 1,000 Units by mouth        * Notice:  This list has 5 medication(s) that are the same as other medications prescribed for you. Read the directions carefully, and ask your doctor or other care provider to review them with you.

## 2018-04-25 NOTE — PATIENT INSTRUCTIONS
1. Continue Velcade/dexamethasone, treatment today.  2. Zometa every 12 weeks  Already Scheduled   3. Continue Aranesp 300 mcg SQ every three weeks   Scheduled - Susy  4. RTC MD 6 weeks - Scheduled Susy    AVS given to patient - Susy

## 2018-04-25 NOTE — TELEPHONE ENCOUNTER
Patient is on 25mcg Synthroid QD.  Patient was told to stop K+ until seen in one month.  Appointment made for 5-22-18 with Dr. Dickson Reich

## 2018-04-25 NOTE — MR AVS SNAPSHOT
After Visit Summary   4/25/2018    Roc Parkinson    MRN: [id number]           Patient Information     Date Of Birth          7/7/1926        Visit Information        Provider Department      4/25/2018 9:30 AM Gretel Quinn MD [facility]        Today's Diagnoses     Multiple myeloma not having achieved remission (H)    -  1      Care Instructions    1. Continue Velcade/dexamethasone, treatment today.  2. Zometa every 12 weeks  Already Scheduled   3. Continue Aranesp 300 mcg SQ every three weeks   Scheduled - Susy  4. RTC MD 6 weeks - Scheduled Susy    AVS given to patient - Susy          Follow-ups after your visit        Your next 10 appointments already scheduled     May 02, 2018 10:00 AM CDT   Level 1 with  INFUSION CHAIR 14   [facility] [place] ([facility])    [facility]  [address]  [place] [address]   [phone]            May 09, 2018 10:00 AM CDT   Level 1 with  INFUSION CHAIR 11   [facility] [place])    [facility] [place] [id number]  [place] [address]   [phone]            May 09, 2018 10:45 AM CDT   Return Visit with Gretel Quinn MD   [facility] ([facility])    [facility]  [address]  [place] [address]   [phone]            May 16, 2018  9:30 AM CDT   Level 1 with  INFUSION CHAIR 9   [facility])    [facility]  [address]  [place] [address]   [phone]            May 22, 2018 10:15 AM CDT   SHORT with Dickson Reich MD   [facility] ([facility])    [address] [place] [address]   [phone]            May 23, 2018 10:00 AM CDT   Level 1  with SH INFUSION CHAIR 16   [facility] ([facility]    [facility]  [address]  [place] [address]   [phone]            May 30, 2018 10:00 AM CDT   Level 1 with SH INFUSION CHAIR 5   [facility]    [facility] [place]  [address]  [place] [address]   [phone]            Jun 06, 2018  9:00 AM CDT   Level 1 with SH INFUSION CHAIR 19   [facility] ([facility])    [place] [id number]  [place] [address]   [phone]            Jun 06, 2018  9:45 AM CDT   Return Visit with Gretel Quinn MD   [facility]    [facility]  [address]  [place] [address]   [phone]            Jun 13, 2018 10:00 AM CDT   Level 1 with SH INFUSION CHAIR 1   [facility] ([facility])    [place] [id number]  [place] [address]   [phone]              Who to contact     If you have questions or need follow up information about today's clinic visit or your schedule please contact [facility] directly at [phone].  Normal or non-critical lab and imaging results will be communicated to you by Xangatihart, letter or phone within 4 business days after the clinic has received the results. If you do not hear from us within 7 days, please contact the clinic through Xangatihart or phone. If you have a critical or abnormal lab result, we will notify you by phone as soon as possible.  Submit refill requests through MegaHoot or call your pharmacy and they will forward the refill request to us. Please allow 3 business days for your refill to be completed.          Additional Information About Your Visit        MegaHoot  "Information     NextDigest lets you send messages to your doctor, view your test results, renew your prescriptions, schedule appointments and more. To sign up, go to [email or web address] . Click on \"Log in\" on the left side of the screen, which will take you to the Welcome page. Then click on \"Sign up Now\" on the right side of the page.     You will be asked to enter the access code listed below, as well as some personal information. Please follow the directions to create your username and password.     Your access code is: [email or web address]  Expires: 2018 11:49 AM     Your access code will  in 90 days. If you need help or a new code, please call your [facility] clinic or [phone].        Care EveryWhere ID     This is your Care EveryWhere ID. This could be used by other organizations to access your [facility] medical records  [id number]        Your Vitals Were     Pulse Temperature Respirations Pulse Oximetry BMI (Body Mass Index)       74 98.7  F (37.1  C) (Oral) 16 96% 30.9 kg/m2        Blood Pressure from Last 3 Encounters:   18 153/68   18 153/68   18 146/76    Weight from Last 3 Encounters:   18 92.2 kg (203 lb 3.2 oz)   18 92.2 kg (203 lb 3.2 oz)   18 92.6 kg (204 lb 2.3 oz)              Today, you had the following     No orders found for display         Today's Medication Changes          These changes are accurate as of 18 11:59 PM.  If you have any questions, ask your nurse or doctor.               These medicines have changed or have updated prescriptions.        Dose/Directions    LISINOPRIL PO   This may have changed:  Another medication with the same name was removed. Continue taking this medication, and follow the directions you see here.        Dose:  15 mg   Take 15 mg by mouth daily   Refills:  0                Primary Care Provider Office Phone # Fax #    Dickson Reich [phone]       [address]  [facility] " [address]        Equal Access to Services     [facility] Center[facility]DELIA : Hadii aad ku hademelyvincenzo Neisha, waemoryda luqjohn, qaybta alexdebbiehanh thomason, melita rodriguez. So [facility] [phone].    ATENCIÓN: Si habla español, tiene a nolan disposición servicios gratuitos de asistencia lingüística. Llame al [phone].    We comply with applicable federal civil rights laws and [place] laws. We do not discriminate on the basis of race, color, national origin, age, disability, sex, sexual orientation, or gender identity.            Thank you!     Thank you for choosing [facility]  for your care. Our goal is always to provide you with excellent care. Hearing back from our patients is one way we can continue to improve our services. Please take a few minutes to complete the written survey that you may receive in the mail after your visit with us. Thank you!             Your Updated Medication List - Protect others around you: Learn how to safely use, store and throw away your medicines at www.disposemymeds.org.          This list is accurate as of 4/25/18 11:59 PM.  Always use your most recent med list.                   Brand Name Dispense Instructions for use Diagnosis    ACYCLOVIR PO      Take 400 mg by mouth 2 times daily Oncology to decide when stop date will be        amLODIPine 10 MG tablet    NORVASC    30 tablet    Take 1 tablet (10 mg) by mouth daily    Essential hypertension       B-D U/F 31G X 8 MM   Generic drug:  insulin pen needle     500 each    USE 5 PEN NEEDLES PER DAY OR AS DIRECTED    Type 2 diabetes mellitus with stage 3 chronic kidney disease, with long-term current use of insulin (H)       Blood Glucose Monitoring Suppl Misc      3 times daily (before meals)        dexamethasone 4 MG tablet    DECADRON    30 tablet    Take 10 tablets (40 mg) by mouth on Days 1, 8, and 15.    Multiple myeloma not having achieved remission (H)       ferrous sulfate 325 (65 Fe) MG tablet    IRON      Take 325 mg by mouth daily (with breakfast)        FREESTYLE LITE test strip   Generic drug:  blood glucose monitoring     400 strip    USE TO TEST FOUR TIMES DAILY    Uncontrolled type 1 diabetes mellitus with stage 3 chronic kidney disease (H)       gemfibrozil 600 MG tablet    LOPID    180 tablet    TAKE 1 TABLET BY MOUTH TWICE DAILY    Hyperlipidemia       * INSULIN ASPART SC      Inject 13 Units Subcutaneous every morning        * INSULIN ASPART SC      Inject 11 Units Subcutaneous daily (before lunch)        * INSULIN ASPART SC      Inject Subcutaneous At Bedtime Inject 4 unit subcutaneously at bedtime for Diabetes II Ok to use Humalog insulin with same order details        * INSULIN ASPART SC      Inject 9 Units Subcutaneous daily (with dinner)        * INSULIN ASPART SC      Inject Subcutaneous 4 times daily Sliding scale: 140-175 1 unit 176-210 2 units 211-245 3 units 246-280 4 units 281-315 5 units 316-350 6 units 351-385 7 units 386-420 8 units >420 9 units        insulin detemir 100 UNIT/ML injection    LEVEMIR FLEXPEN/FLEXTOUCH    15 mL    Inject 10 Units Subcutaneous every morning (before breakfast)    Type 2 diabetes mellitus with diabetic chronic kidney disease, unspecified CKD stage, unspecified long term insulin use status (H), Uncontrolled type 2 diabetes mellitus with hyperglycemia, unspecified long term insulin use status (H), Type 2 diabetes mellitus with stage 3 chronic kidney disease, with long-term current use of insulin (H)       levothyroxine 25 MCG tablet    SYNTHROID/LEVOTHROID    90 tablet    TAKE 1 TABLET BY MOUTH EVERY DAY    Acquired hypothyroidism       LISINOPRIL PO      Take 15 mg by mouth daily        LORazepam 0.5 MG tablet    ATIVAN    30 tablet    Take 1 tablet (0.5 mg) by mouth every 4 hours as needed (Anxiety, Nausea/Vomiting or Sleep)    Multiple myeloma not having achieved remission (H)       metoprolol succinate 50 MG 24 hr tablet    TOPROL-XL    30 tablet    Take 1  tablet (50 mg) by mouth daily    Atrial fibrillation with rapid ventricular response (H)       polyethylene glycol Packet    MIRALAX/GLYCOLAX    7 packet    Take 17 g by mouth 2 times daily as needed (constipation)    Slow transit constipation       prochlorperazine 10 MG tablet    COMPAZINE    30 tablet    Take 1 tablet (10 mg) by mouth every 6 hours as needed (Nausea/Vomiting)    Multiple myeloma not having achieved remission (H)       Selenium 200 MCG Tabs      Take 100 mcg by mouth daily. Pt takes one half tab of the 200 mcg daily        VITAMIN C PO      Take 500 mg by mouth daily        VITAMIN D (CHOLECALCIFEROL) PO      Take 1,000 Units by mouth        * Notice:  This list has 5 medication(s) that are the same as other medications prescribed for you. Read the directions carefully, and ask your doctor or other care provider to review them with you.

## 2018-04-26 NOTE — TELEPHONE ENCOUNTER
Patient was called at home phone. Informed pt should take 2 synthroid tablets Monday and Friday and recheck labs in May. Pt verbalized understanding and agreement with plan.

## 2018-04-27 NOTE — TELEPHONE ENCOUNTER
"Requested Prescriptions   Pending Prescriptions Disp Refills     furosemide (LASIX) 20 MG tablet [Pharmacy Med Name: FUROSEMIDE 20MG TABLETS] 30 tablet 0    Last Written Prescription Date:  01/18/2018  Last Fill Quantity: 90,  # refills: 1   Last office visit: 4/3/2018 with prescribing provider:  04/03/2018   Future Office Visit:   Next 5 appointments (look out 90 days)     May 09, 2018 10:45 AM CDT   Return Visit with Gretel Quinn MD   [facility] ([facility])    [facility]  [address] [facility] [address]  [facility] [address]   [phone]            May 22, 2018 10:15 AM CDT   SHORT with Dickson Reich MD   [facility] ([facility])    [address] [place] [address]   [phone]            Jun 06, 2018  9:45 AM CDT   Return Visit with Gretel Quinn MD   [facility] ([facility])    [facility]  [address]  [facility] [address]   [phone]                Sig: TAKE 1 TABLET BY MOUTH EVERY DAY    Diuretics (Including Combos) Protocol Failed    4/27/2018 11:15 AM       Failed - Blood pressure under 140/90 in past 12 months    BP Readings from Last 3 Encounters:   04/25/18 153/68   04/25/18 153/68   04/18/18 146/76                Passed - Recent (12 mo) or future (30 days) visit within the authorizing provider's specialty    Patient had office visit in the last 12 months or has a visit in the next 30 days with authorizing provider or within the authorizing provider's specialty.  See \"Patient Info\" tab in inbasket, or \"Choose Columns\" in Meds & Orders section of the refill encounter.           Passed - Patient is age 18 or older       Passed - Normal serum creatinine on file in past 12 months    Recent Labs   Lab Test  04/11/18   0806   CR  0.94             Passed - Normal serum potassium on file in past 12 months    " Recent Labs   Lab Test  04/25/18   0808   POTASSIUM  4.5                   Passed - Normal serum sodium on file in past 12 months    Recent Labs   Lab Test  04/11/18   0806   NA  138

## 2018-04-27 NOTE — PROGRESS NOTES
Clinic Care Coordination Contact    Situation: Patient chart reviewed by care coordinator.    Background: 89 year old male receiving treatment for multiple myeloma. Patient is being followed closely by oncology.     Assessment: Patient discharged from TCU without notification to clinic care coordinator. He returned home with no home care services. He has followed up with PCP.      Plan/Recommendations: Will close to primary care clinic care coordination at this time.     Olga Lidia Oneil RN, CCM - Care Coordinator     4/27/2018    3:17 PM  [phone]

## 2018-05-02 NOTE — PATIENT INSTRUCTIONS
Call this clinic if signs of bleeding occur:  -nose bleeds  -blood in urine or stool  -excessive bruising  -wounds that won't heal or difficult to stop bleeding  -excessive gum bleeding    [phone]  - 24 hours a day  Doctor on call after clinic hours

## 2018-05-02 NOTE — PROGRESS NOTES
Infusion Nursing Note:  Roc Parkinson presents today for velcade.    Patient seen by provider today: No   present during visit today: Not Applicable.    Note: S/S Thrombocytopenia reviewed with patient-written info given to patient. See patient instructions.    Intravenous Access:  Lab draw site left hand, Needle type BF, Gauge 21.    Treatment Conditions:  Lab Results   Component Value Date    HGB 9.0 05/02/2018     Lab Results   Component Value Date    WBC 6.3 05/02/2018      Lab Results   Component Value Date    ANEU 5.0 05/02/2018     Lab Results   Component Value Date    PLT 46 05/02/2018      Lab Results   Component Value Date     05/02/2018                   Lab Results   Component Value Date    POTASSIUM 4.7 05/02/2018           Lab Results   Component Value Date    MAG 2.0 03/26/2018            Lab Results   Component Value Date    CR 0.96 05/02/2018                   Lab Results   Component Value Date    MICHELLE 9.5 05/02/2018                Lab Results   Component Value Date    BILITOTAL 0.4 05/02/2018           Lab Results   Component Value Date    ALBUMIN 2.7 05/02/2018                    Lab Results   Component Value Date    ALT 13 05/02/2018           Lab Results   Component Value Date    AST 12 05/02/2018       Results reviewed, labs MET treatment parameters, ok to proceed with treatment.      Post Infusion Assessment:  Patient tolerated injection without incident.  .  Site patent and intact, free from redness, edema or discomfort.    Discharge Plan:   Discharge instructions reviewed with: Patient.  Patient and/or family verbalized understanding of discharge instructions and all questions answered.  Copy of AVS reviewed with patient and/or family.  Patient will return 5/16/18 for next appointment.  Patient discharged in stable condition accompanied by: self.  Departure Mode: Ambulatory.    Doug Rios RN

## 2018-05-02 NOTE — MR AVS SNAPSHOT
After Visit Summary   5/2/2018    Roc Parkinson    MRN: [id number]           Patient Information     Date Of Birth          7/7/1926        Visit Information        Provider Department      5/2/2018 10:00 AM  INFUSION CHAIR 14 [facility]        Today's Diagnoses     Multiple myeloma not having achieved remission (H)    -  1      Care Instructions    Call this clinic if signs of bleeding occur:  -nose bleeds  -blood in urine or stool  -excessive bruising  -wounds that won't heal or difficult to stop bleeding  -excessive gum bleeding    [phone]  - 24 hours a day  Doctor on call after clinic hours          Follow-ups after your visit        Your next 10 appointments already scheduled     May 09, 2018 10:00 AM CDT   Level 1 with  INFUSION CHAIR 11   [facility] ([facility])    [facility]  [address]  [place] [address]   [phone]            May 16, 2018  9:30 AM CDT   Level 1 with  INFUSION CHAIR 9   [facility] ([facility])    [facility]  [address]  [place] [address]   [phone]            May 22, 2018 10:15 AM CDT   SHORT with Dickson Reich MD   [facility] ([facility])    [address] [place] [address]   [phone]            May 23, 2018 10:00 AM CDT   Level 1 with  INFUSION CHAIR 16   [facility] ([facility])    [facility]  [address]  [place] [address]   [phone]            May 30, 2018 10:00 AM CDT   Level 1 with  INFUSION CHAIR 5   [facility] ([facility])    [facility]  [address]  [place]  [address]   [phone]            Jun 06, 2018  9:00 AM CDT   Level 1 with SH INFUSION CHAIR 19   [facility] ([facility])    [facility] [place]  [address]  [place] [address]   [phone]            Jun 06, 2018  9:45 AM CDT   Return Visit with Gretel Quinn MD   [facility] ([facility])    [place] [id number]  [place] [address]   [phone]            Jun 13, 2018 10:00 AM CDT   Level 1 with SH INFUSION CHAIR 1   [facility] ([facility])    [facility]  [address]  [place] [address]   [phone]            Jun 20, 2018 10:00 AM CDT   Level 1 with SH INFUSION CHAIR 6   [facility] ([facility])    [place] [id number]  [place] [address]   [phone]            Jun 27, 2018 10:00 AM CDT   Level 1 with SH INFUSION CHAIR 3   [facility] ([facility])    [place] [id number]  [place] [address]   [phone]              Who to contact     If you have questions or need follow up information about today's clinic visit or your schedule please contact [facility] directly at [phone].  Normal or non-critical lab and imaging results will be communicated to you by MyChart, letter or phone within 4 business days after the clinic has received the results. If you do not hear from us within 7 days, please contact the clinic through MyChart or phone. If you have a critical or abnormal lab result, we will notify you by phone as soon as possible.  Submit refill requests through [employer] or call your pharmacy and they will forward the refill request to us. Please allow 3 business days for  "your refill to be completed.          Additional Information About Your Visit        [employer] Information     vLine lets you send messages to your doctor, view your test results, renew your prescriptions, schedule appointments and more. To sign up, go to [email or web address] . Click on \"Log in\" on the left side of the screen, which will take you to the Welcome page. Then click on \"Sign up Now\" on the right side of the page.     You will be asked to enter the access code listed below, as well as some personal information. Please follow the directions to create your username and password.     Your access code is: [email or web address]  Expires: 2018 11:49 AM     Your access code will  in 90 days. If you need help or a new code, please call your [facility] clinic or [phone].        Care EveryWhere ID     This is your Care EveryWhere ID. This could be used by other organizations to access your [facility] medical records  [id number]        Your Vitals Were     Pulse Temperature                80 98.2  F (36.8  C) (Oral)           Blood Pressure from Last 3 Encounters:   18 122/62   18 153/68   18 153/68    Weight from Last 3 Encounters:   18 92.2 kg (203 lb 3.2 oz)   18 92.2 kg (203 lb 3.2 oz)   18 92.6 kg (204 lb 2.3 oz)              We Performed the Following     CBC with platelets differential     Comprehensive metabolic panel     Kappa and lambda light chain     Protein electrophoresis     Protein Immunofixation Serum          Today's Medication Changes          These changes are accurate as of 18 11:21 AM.  If you have any questions, ask your nurse or doctor.               These medicines have changed or have updated prescriptions.        Dose/Directions    * dexamethasone 4 MG tablet   Commonly known as:  DECADRON   This may have changed:  Another medication with the same name was added. Make sure you understand how and when to take each.   Used for:  Multiple " myeloma not having achieved remission (H)        Take 10 tablets (40 mg) by mouth on Days 1, 8, and 15.   Quantity:  30 tablet   Refills:  0       * dexamethasone 4 MG tablet   Commonly known as:  DECADRON   This may have changed:  You were already taking a medication with the same name, and this prescription was added. Make sure you understand how and when to take each.   Used for:  Multiple myeloma not having achieved remission (H)        Take 10 tablets (40 mg) by mouth on Days 1, 8, and 15.   Quantity:  30 tablet   Refills:  0       * Notice:  This list has 2 medication(s) that are the same as other medications prescribed for you. Read the directions carefully, and ask your doctor or other care provider to review them with you.         Where to get your medicines      These medications were sent to [facility] [address] [facility] [address] AT [facility] [address]  [address], [facility] [address]     Phone:  [phone]     dexamethasone 4 MG tablet                Primary Care Provider Office Phone # Fax #    Dickson Reich [phone] [address]  [facility] [address]        Equal Access to Services     ELIE GAGNON AH: Hadii virginia wilson hadasho Soomaali, waaxda luqadaha, qaybta kaalmada adeegyada, melita rodriguez. So [facility] [phone].    ATENCIÓN: Si habla español, tiene a nolan disposición servicios gratuitos de asistencia lingüística. Llame al [phone].    We comply with applicable federal civil rights laws and [place] laws. We do not discriminate on the basis of race, color, national origin, age, disability, sex, sexual orientation, or gender identity.            Thank you!     Thank you for choosing [facility]  for your care. Our goal is always to provide you with excellent care. Hearing back from our patients is one way we can continue to improve our services. Please take a few  minutes to complete the written survey that you may receive in the mail after your visit with us. Thank you!             Your Updated Medication List - Protect others around you: Learn how to safely use, store and throw away your medicines at www.disposemymeds.org.          This list is accurate as of 5/2/18 11:21 AM.  Always use your most recent med list.                   Brand Name Dispense Instructions for use Diagnosis    ACYCLOVIR PO      Take 400 mg by mouth 2 times daily Oncology to decide when stop date will be        amLODIPine 10 MG tablet    NORVASC    30 tablet    Take 1 tablet (10 mg) by mouth daily    Essential hypertension       B-D U/F 31G X 8 MM   Generic drug:  insulin pen needle     500 each    USE 5 PEN NEEDLES PER DAY OR AS DIRECTED    Type 2 diabetes mellitus with stage 3 chronic kidney disease, with long-term current use of insulin (H)       Blood Glucose Monitoring Suppl Misc      3 times daily (before meals)        * dexamethasone 4 MG tablet    DECADRON    30 tablet    Take 10 tablets (40 mg) by mouth on Days 1, 8, and 15.    Multiple myeloma not having achieved remission (H)       * dexamethasone 4 MG tablet    DECADRON    30 tablet    Take 10 tablets (40 mg) by mouth on Days 1, 8, and 15.    Multiple myeloma not having achieved remission (H)       ferrous sulfate 325 (65 Fe) MG tablet    IRON     Take 325 mg by mouth daily (with breakfast)        FREESTYLE LITE test strip   Generic drug:  blood glucose monitoring     400 strip    USE TO TEST FOUR TIMES DAILY    Uncontrolled type 1 diabetes mellitus with stage 3 chronic kidney disease (H)       furosemide 20 MG tablet    LASIX    30 tablet    TAKE 1 TABLET BY MOUTH EVERY DAY    Multiple myeloma not having achieved remission (H)       gemfibrozil 600 MG tablet    LOPID    180 tablet    TAKE 1 TABLET BY MOUTH TWICE DAILY    Hyperlipidemia       * INSULIN ASPART SC      Inject 13 Units Subcutaneous every morning        * INSULIN ASPART SC       Inject 11 Units Subcutaneous daily (before lunch)        * INSULIN ASPART SC      Inject Subcutaneous At Bedtime Inject 4 unit subcutaneously at bedtime for Diabetes II Ok to use Humalog insulin with same order details        * INSULIN ASPART SC      Inject 9 Units Subcutaneous daily (with dinner)        * INSULIN ASPART SC      Inject Subcutaneous 4 times daily Sliding scale: 140-175 1 unit 176-210 2 units 211-245 3 units 246-280 4 units 281-315 5 units 316-350 6 units 351-385 7 units 386-420 8 units >420 9 units        insulin detemir 100 UNIT/ML injection    LEVEMIR FLEXPEN/FLEXTOUCH    15 mL    Inject 10 Units Subcutaneous every morning (before breakfast)    Type 2 diabetes mellitus with diabetic chronic kidney disease, unspecified CKD stage, unspecified long term insulin use status (H), Uncontrolled type 2 diabetes mellitus with hyperglycemia, unspecified long term insulin use status (H), Type 2 diabetes mellitus with stage 3 chronic kidney disease, with long-term current use of insulin (H)       levothyroxine 25 MCG tablet    SYNTHROID/LEVOTHROID    90 tablet    TAKE 1 TABLET BY MOUTH EVERY DAY    Acquired hypothyroidism       LISINOPRIL PO      Take 15 mg by mouth daily        LORazepam 0.5 MG tablet    ATIVAN    30 tablet    Take 1 tablet (0.5 mg) by mouth every 4 hours as needed (Anxiety, Nausea/Vomiting or Sleep)    Multiple myeloma not having achieved remission (H)       metoprolol succinate 50 MG 24 hr tablet    TOPROL-XL    30 tablet    Take 1 tablet (50 mg) by mouth daily    Atrial fibrillation with rapid ventricular response (H)       polyethylene glycol Packet    MIRALAX/GLYCOLAX    7 packet    Take 17 g by mouth 2 times daily as needed (constipation)    Slow transit constipation       prochlorperazine 10 MG tablet    COMPAZINE    30 tablet    Take 1 tablet (10 mg) by mouth every 6 hours as needed (Nausea/Vomiting)    Multiple myeloma not having achieved remission (H)       Selenium 200 MCG Tabs       Take 100 mcg by mouth daily. Pt takes one half tab of the 200 mcg daily        VITAMIN C PO      Take 500 mg by mouth daily        VITAMIN D (CHOLECALCIFEROL) PO      Take 1,000 Units by mouth        * Notice:  This list has 7 medication(s) that are the same as other medications prescribed for you. Read the directions carefully, and ask your doctor or other care provider to review them with you.

## 2018-05-04 NOTE — TELEPHONE ENCOUNTER
"Pt states his platelet count is low and since yesterday he is having a large amount of blood in his urine. Urine looks like fruit punch. No clots. Feeling \"weak\" now but able to stand. Triaged to see provider within 4 hours. Clinics now closed for weekend. Paged Dr Quinn, on-call (this is pt's doctor also) @5:01pm to call pt at [phone]. Advised pt call back if no call from  in 20-30 min. Pt voiced understanding and agreement. Rita Jeffers RN/FNA      Reason for Disposition    Taking Coumadin (warfarin) or other strong blood thinner, or known bleeding disorder (e.g., thrombocytopenia)    Additional Information    Negative: Shock suspected (e.g., cold/pale/clammy skin, too weak to stand, low BP, rapid pulse)    Negative: Sounds like a life-threatening emergency to the triager    Negative: Urinary catheter, questions about    Negative: Recent back or abdominal injury    Negative: Recent genital injury    Negative: [1] Unable to urinate (or only a few drops) > 4 hours AND [2] bladder feels very full (e.g., palpable bladder or strong urge to urinate)    Negative: Passing pure blood or large blood clots (i.e., size > a dime) (Exception: alin or small strands)    Negative: Fever > 100.5 F (38.1 C)    Negative: Patient sounds very sick or weak to the triager    Negative: Known sickle cell disease    Protocols used: URINE - BLOOD IN-ADULT-    "

## 2018-05-07 PROBLEM — R31.9 HEMATURIA: Status: ACTIVE | Noted: 2018-01-01

## 2018-05-07 NOTE — ED PROVIDER NOTES
History     Chief Complaint:  Hematuria    HPI   Roc Parkinson is a 89 year old male with a history of CAD, HTN, hyperlipidemia, insulin dependent DM, CKD, atrial fibrillation, remote history prostate and bladder cancer, and multiple myeloma who presents to the emergency department for evaluation of hematuria. Of note, the patient has a history of multiple myeloma with a background of myelodysplastic syndrome requiring nearly 30 blood transfusions in the past year. He follows with Dr. Quinn, hematology/oncology for this issue. The patient states that he has had progressively worsening gross hematuria for the past 7 days or so,along with some slight dysuria and urinary frequency. He began passing some small clots this morning as well. He feels as though he is voiding completely and does think that he is retaining urine. The patient has felt somewhat more weak/ fatigued and lightheaded in the past few days as well. Given the patient's history, he was concerned about the need for a possible blood transfusion with these symptoms, prompting his ED visit today. He denies any recent measured fevers, cough, rhinorrhea, abdominal pain, or black or bloody stools and has otherwise been feeling at his baseline.     Allergies:  No Known Allergies     Medications:    Acyclovir  Amlodipine  Decadron  Lasix  Lopid  Insulin (Aspart and Detemir)  Synthroid  Lisinopril  Ativan  Metoprolol  Miralax  Compazine  Selenium  Velcade  Aransep  Zometa     Past Medical History:    arthritis  CAD  HTN  hyperlipidemia  hypothyroidism  Malignant neoplasm of the bladder and prostate   Congenital hiatus hernia  Varicose veins  Carpal tunnel syndrome  DM, type II  IBS  Anemia  Multiple myeloma   CKD  myelodysplastic syndrome  ESBL UTI    Past Surgical History:    TKA  Bladder biopsy  Colonoscopy  TURP  Anal fistula repair  Cataract removal  Melanoma removal    Family History:    Arrhythmia  Paget's disease    Social History:  Presents with his  "brother.   Negative for tobacco use.  Negative for alcohol use.  Lives in independent living.   Ambulates with a walker at baseline.   Marital Status:   [5]     Review of Systems   Constitutional: Positive for fatigue. Negative for fever.   HENT: Negative for rhinorrhea.    Respiratory: Negative for cough.    Gastrointestinal: Negative for abdominal pain and blood in stool.   Genitourinary: Positive for dysuria, frequency and hematuria. Negative for difficulty urinating and urgency.   Neurological: Positive for light-headedness.   All other systems reviewed and are negative.    Physical Exam     Patient Vitals for the past 24 hrs:   BP Temp Temp src Pulse Heart Rate Resp SpO2 Height Weight   05/07/18 1252 106/50 - - - 70 - 99 % - -   05/07/18 1251 141/65 - - - 68 - 97 % - -   05/07/18 1207 148/73 98  F (36.7  C) Oral - 67 18 97 % 1.753 m (5' 9\") 89 kg (196 lb 3.2 oz)   05/07/18 1152 - - - - - - 94 % - -   05/07/18 1148 - - - - 77 18 96 % - -   05/07/18 1130 154/72 - - - - - 98 % - -   05/07/18 1127 - - - - - - 95 % - -   05/07/18 1109 110/50 - - - - - - - -   05/07/18 0956 118/63 98.1  F (36.7  C) Temporal 85 - 18 97 % 1.753 m (5' 9\") 90.7 kg (200 lb)     Physical Exam  GENERAL: well developed, pleasant  HEAD: atraumatic  EYES: pupils reactive, extraocular muscles intact, conjunctivae normal  ENT:  mucus membranes moist  NECK:  trachea midline, normal range of motion  RESPIRATORY: no tachypnea, breath sounds clear to auscultation   CVS: normal S1/S2, no murmurs, intact distal pulses  ABDOMEN: soft, nontender, nondistention  MUSCULOSKELETAL: no deformities  SKIN: warm and dry, no acute rashes or ulceration  NEURO: GCS 15, cranial nerves intact, alert and oriented x3, generalized weakness  PSYCH:  Mood/affect normal    Emergency Department Course   Laboratory:  CBC: WBC: 5.1, HGB: 8.9 (L), PLT: 43 (LL)  BMP: Glucose 198 (H), BUN 33 (H), GFR Estimate 59 (L), o/w WNL (Creatinine: 1.16)    INR: 1.13    ABO/Rh type " and screen: O positive     UA with micro: glucose 30, moderate blood, protein albumin 100, ketone 5, large Leukocyte Esterase, WBC >182 (H), RBC >182 (H), WBC clumps, hyaline casts 8 (H) o/w negative  Urine Culture: pending    Interventions:  1109 Zosyn 3.375 g IV (stopped at 1112 per hospitalist's request)  1151 Invanz 1 g IV    Emergency Department Course:  Nursing notes and vitals reviewed. 1019 I performed an exam of the patient as documented above. The patient was placed under contact isolation precautions.     IV inserted. Medicine administered as documented above. Blood drawn. This was sent to the lab for further testing, results above.    The patient provided a urine sample here in the emergency department. This was sent for laboratory testing, findings above.     1030  Bladder scan revealed 43 mL of retained fluid.     1048 Lab called with critical platelet results.     1053  I consulted with Dr. Howell of the hospitalist services. He is in agreement to accept the patient for admission.    1055 I rechecked the patient and discussed the results of his workup thus far.     1100 I discussed the case with Dr. Quinn, hematology/oncology.     Findings and plan explained to the Patient who consents to admission. Discussed the patient with Dr. Howell, who will admit the patient to a medical bed for further monitoring, evaluation, and treatment.    Impression & Plan    Medical Decision Making:  Roc Parkinson is a 89 year old male who presents with hematuria and urinary frequency. Certainly, UTI or other etiologies were considered. Urine looks concerning for UTI. He has a history of ESBL. We did do a bladder scan, which was negative and he is not retaining. The patient is still able to urinate, so catheter was not placed. I spoke with Dr. Dr. Howell of the hospitalist services for admission, as well as Dr. Quinn. Cultures of his urine have been sent.     Diagnosis:    ICD-10-CM   1. Acute hemorrhagic cystitis  N30.01   2. Generalized muscle weakness M62.81       Disposition:  Admitted to Dr. Howell of the hospitalist services.  I, Marly Aguilar, am serving as a scribe on 5/7/2018 at 10:19 AM to personally document services performed by Fuentes Conway MD based on my observations and the provider's statements to me.     Marly Aguilar  5/7/2018    EMERGENCY DEPARTMENT       Fuentes Conway MD  05/07/18 [id number]

## 2018-05-07 NOTE — PROVIDER NOTIFICATION
MD Notification    Notified Person: MD    Notified Person Name: Dr. Bermankyara    Notification Date/Time: 5/7/2018 5:08 PM     Notification Interaction: text page    Purpose of Notification: FYI page- pt feeling like BG low and asked to be checked- BG was 61 at 1700. Giving juice/snacks and holding prandial and SSI for now.     Orders Received:    Comments:

## 2018-05-07 NOTE — PHARMACY-ADMISSION MEDICATION HISTORY
Admission medication history interview status for the 5/7/2018  admission is complete. See EPIC admission navigator for prior to admission medications     Medication history source reliability:Moderate    Actions taken by pharmacist (provider contacted, etc):  Spoke w/ patient who stated that med list was just updated at his last office visit and it was accurate.  Said this would be the most reliable source to use.  Reviewed patient chart.  Reviewed recent fill history through [facility].  Patient did discuss OTC supplements with me and his insulin dosing.      Additional medication history information not noted on PTA med list :None    Medication reconciliation/reorder completed by provider prior to medication history? No    Time spent in this activity: 25 minutes    Prior to Admission medications    Medication Sig Last Dose Taking? Auth Provider   ACYCLOVIR PO Take 400 mg by mouth 2 times daily Oncology to decide when stop date will be 5/7/2018 at am Yes Reported, Patient   amLODIPine (NORVASC) 10 MG tablet Take 1 tablet (10 mg) by mouth daily 5/7/2018 at am Yes Cindy Kitchen MD   Ascorbic Acid (VITAMIN C PO) Take 500 mg by mouth daily  5/7/2018 at am Yes Unknown, Entered By History   dexamethasone (DECADRON) 4 MG tablet Take 10 tablets (40 mg) by mouth on Days 1, 8, and 15.  Yes Gretel Quinn MD   ferrous sulfate (IRON) 325 (65 FE) MG tablet Take 325 mg by mouth daily (with breakfast) 5/7/2018 at am Yes Unknown, Entered By History   furosemide (LASIX) 20 MG tablet TAKE 1 TABLET BY MOUTH EVERY DAY 5/7/2018 at am Yes Dickson Reich MD   gemfibrozil (LOPID) 600 MG tablet TAKE 1 TABLET BY MOUTH TWICE DAILY 5/7/2018 at am Yes Dickson Reich MD   INSULIN ASPART SC Inject 13 Units Subcutaneous every morning  5/7/2018 at am Yes Reported, Patient   INSULIN ASPART SC Inject 11 Units Subcutaneous daily (before lunch)  5/6/2018 at 1200 Yes Reported, Patient   INSULIN ASPART SC Inject Subcutaneous  At Bedtime Inject 4 unit subcutaneously at bedtime for Diabetes II Ok to use Humalog insulin with same order details 5/6/2018 at hs Yes Reported, Patient   INSULIN ASPART SC Inject 9 Units Subcutaneous daily (with dinner) 5/6/2018 at pm Yes Unknown, Entered By History   INSULIN ASPART SC Inject Subcutaneous 4 times daily Sliding scale:  140-175 1 unit  176-210 2 units  211-245 3 units  246-280 4 units  281-315 5 units  316-350 6 units  351-385 7 units  386-420 8 units  >420 9 units 5/6/2018 at Unknown time Yes Unknown, Entered By History   insulin detemir (LEVEMIR FLEXPEN/FLEXTOUCH) 100 UNIT/ML injection Inject 10 Units Subcutaneous every morning (before breakfast) 5/7/2018 at am Yes Cindy Kitchen MD   levothyroxine (SYNTHROID/LEVOTHROID) 25 MCG tablet TAKE 1 TABLET BY MOUTH EVERY DAY 5/7/2018 at am Yes Dickson Reich MD   LISINOPRIL PO Take 15 mg by mouth daily 5/7/2018 at am Yes Reported, Patient   LORazepam (ATIVAN) 0.5 MG tablet Take 1 tablet (0.5 mg) by mouth every 4 hours as needed (Anxiety, Nausea/Vomiting or Sleep)  at prn Yes Gretel Quinn MD   metoprolol succinate (TOPROL-XL) 50 MG 24 hr tablet Take 1 tablet (50 mg) by mouth daily 5/7/2018 at am Yes Cindy Kitchen MD   polyethylene glycol (MIRALAX/GLYCOLAX) Packet Take 17 g by mouth 2 times daily as needed (constipation)  at prn Yes Kellee Melo MD   prochlorperazine (COMPAZINE) 10 MG tablet Take 1 tablet (10 mg) by mouth every 6 hours as needed (Nausea/Vomiting)  at prn Yes Gretel Quinn MD   Selenium 200 MCG TABS Take 100 mcg by mouth daily. Pt takes one half tab of the 200 mcg daily  5/7/2018 at am Yes Reported, Patient   VITAMIN D, CHOLECALCIFEROL, PO Take 1,000 Units by mouth daily  5/7/2018 at am Yes Reported, Patient   B-D U/F 31G X 8 MM insulin pen needle USE 5 PEN NEEDLES PER DAY OR AS DIRECTED   Dickson Reich MD   Blood Glucose Monitoring Suppl MISC 3 times daily (before meals)    Reported, Patient   FREESTYLE  LITE test strip USE TO TEST FOUR TIMES DAILY   Dickson Reich MD Carolyn Dahlman, PharmD, BCPS

## 2018-05-07 NOTE — ED NOTES
"[facility]  ED Nurse Handoff Report    ED Chief complaint: Hematuria (pt has hx of Multiple myeloma and has hematuria which has happened before)      ED Diagnosis:   Final diagnoses:   Acute hemorrhagic cystitis   Generalized muscle weakness       Code Status: Hospitalist to address.     Allergies: No Known Allergies    Activity level - Baseline/Home:  Independent with walker    Activity Level - Current:   Independent w walker     Needed?: No    Isolation: Yes  Infection: Not Applicable  ESBL    Bariatric?: No    Vital Signs:   Vitals:    05/07/18 0956   BP: 118/63   Pulse: 85   Resp: 18   Temp: 98.1  F (36.7  C)   TempSrc: Temporal   SpO2: 97%   Weight: 90.7 kg (200 lb)   Height: 1.753 m (5' 9\")       Cardiac Rhythm: ,        Pain level: 0-10 Pain Scale: 0    Is this patient confused?: No     Patient Report: Initial Complaint: Pt presents to the ER with c/o hematuria and 1 wk of increased weakness.   Focused Assessment: Pt presents with his brother states he has a hx of multiple myeloma, had low platelets last time they were checked, c/o weakness and hematuria - Began passing some small clots this morning as well. Pt feels he is voiding completely.  Bladder scan done at bedside post void, 42cc noted.   Tests Performed:   Results for orders placed or performed during the hospital encounter of 05/07/18 (from the past 24 hour(s))   UA with Microscopic   Result Value Ref Range    Color Urine Red     Appearance Urine Cloudy     Glucose Urine 30 (A) NEG^Negative mg/dL    Bilirubin Urine Negative NEG^Negative    Ketones Urine 5 (A) NEG^Negative mg/dL    Specific Gravity Urine 1.017 1.003 - 1.035    Blood Urine Moderate (A) NEG^Negative    pH Urine 5.5 5.0 - 7.0 pH    Protein Albumin Urine 100 (A) NEG^Negative mg/dL    Urobilinogen mg/dL 2.0 0.0 - 2.0 mg/dL    Nitrite Urine Negative NEG^Negative    Leukocyte Esterase Urine Large (A) NEG^Negative    Source Midstream Urine     WBC Urine >182 (H) 0 " - 5 /HPF    RBC Urine >182 (H) 0 - 2 /HPF    WBC Clumps Present (A) NEG^Negative /HPF    Squamous Epithelial /HPF Urine 1 0 - 1 /HPF    Hyaline Casts 8 (H) 0 - 2 /LPF   CBC with platelets + differential   Result Value Ref Range    WBC 5.1 4.0 - 11.0 10e9/L    RBC Count 2.49 (L) 4.4 - 5.9 10e12/L    Hemoglobin 8.9 (L) 13.3 - 17.7 g/dL    Hematocrit 27.3 (L) 40.0 - 53.0 %     (H) 78 - 100 fl    MCH 35.7 (H) 26.5 - 33.0 pg    MCHC 32.6 31.5 - 36.5 g/dL    RDW 20.3 (H) 10.0 - 15.0 %    Platelet Count 43 (LL) 150 - 450 10e9/L    Diff Method Automated Method     % Neutrophils 73.0 %    % Lymphocytes 19.7 %    % Monocytes 4.7 %    % Eosinophils 1.0 %    % Basophils 0.0 %    % Immature Granulocytes 1.6 %    Nucleated RBCs 0 0 /100    Absolute Neutrophil 3.7 1.6 - 8.3 10e9/L    Absolute Lymphocytes 1.0 0.8 - 5.3 10e9/L    Absolute Monocytes 0.2 0.0 - 1.3 10e9/L    Absolute Eosinophils 0.1 0.0 - 0.7 10e9/L    Absolute Basophils 0.0 0.0 - 0.2 10e9/L    Abs Immature Granulocytes 0.1 0 - 0.4 10e9/L    Absolute Nucleated RBC 0.0    Basic metabolic panel   Result Value Ref Range    Sodium 135 133 - 144 mmol/L    Potassium 4.2 3.4 - 5.3 mmol/L    Chloride 103 94 - 109 mmol/L    Carbon Dioxide 24 20 - 32 mmol/L    Anion Gap 8 3 - 14 mmol/L    Glucose 198 (H) 70 - 99 mg/dL    Urea Nitrogen 33 (H) 7 - 30 mg/dL    Creatinine 1.16 0.66 - 1.25 mg/dL    GFR Estimate 59 (L) >60 mL/min/1.7m2    GFR Estimate If Black 71 >60 mL/min/1.7m2    Calcium 9.3 8.5 - 10.1 mg/dL   ABO/Rh type and screen   Result Value Ref Range    ABO O     RH(D) Pos     Antibody Screen Neg     Test Valid Only At [facility]        Specimen Expires 05/10/2018    INR   Result Value Ref Range    INR 1.13 0.86 - 1.14       Abnormal Results: see above  Treatments provided: IV abx, ER MD talked w Oncologist, no transfusion at this time.    Family Comments: Brother at bedside.     OBS brochure/video discussed/provided to patient: No    ED Medications:    Medications   piperacillin-tazobactam (ZOSYN) 3.375 g vial to attach to  mL bag (0 g Intravenous Stopped 5/7/18 1112)       Drips infusing?:  No    For the majority of the shift this patient was Green.   Interventions performed were .    Severe Sepsis OR Septic Shock Diagnosis Present: No      ED NURSE PHONE NUMBER: [phone]

## 2018-05-07 NOTE — CONSULTS
Consult Date:  05/07/2018      REQUESTING PHYSICIAN:  This consult has been requested by ALEXANDER Sylvester CNP for multiple myeloma.      HISTORY OF PRESENT ILLNESS:    Mr. Parkinson is a 89-year-old gentleman with IgM kappa multiple myeloma diagnosed in 11/2016. Bone marrow biopsy had revealed myeloma and possible MDS. Patient has been on multiple different treatments for multiple myeloma.  He is currently on weekly Velcade and dexamethasone. Last treatment on 05/02/2018.  He was on daratumumab which was stopped because of infusion reaction.      Patient presented to the Emergency Room with hematuria.  This has been going on for about 4-5 days.  It has gotten worse.  He has bright red blood in the urine.  He also has passed some small clots.  Denies any bleeding from any other site.  No bleeding from ear, nose or throat.  No blood in the stool.  No easy bruising.      Patient has a history of prostate.  Prostate cancer was treated with radiation therapy many years ago.  Patient also has history of superficial bladder cancer.  He has been doing well from both prostate and bladder cancer.  He has not seen the urologist recently.      REVIEW OF SYSTEMS:    Patient denies any headache.  No dizziness.  No chest pain.  No difficulty breathing.  No abdominal pain, nausea or vomiting.  Appetite overall has been good.  No bowel problem.  No fever, chills or night sweats.      ALLERGIES:  REVIEWED.        MEDICATIONS:  Reviewed.      PAST MEDICAL HISTORY:   1.  Coronary artery disease.   2.  Essential hypertension.   3.  Hyperlipidemia.   4.  Diabetes mellitus type 2.   5.  Chronic kidney disease, stage 3.   6.  Paroxysmal atrial fibrillation  7.  History of prostate and bladder cancer.   8.  Multiple myeloma.   9.  Osteoarthritis.   10.  History of urinary tract infection due to ESBL.   11.  Hypothyroidism.   12.  Iron deficiency anemia.   13.  ? Myelodysplastic syndrome.   14.  Vitamin D deficiency.   15.  Irritable bowel  syndrome.   16.  Heart failure.   17.  Chemotherapy-induced pancytopenia.   18.  Gastroesophageal reflux disease.       PAST SURGICAL HISTORY:   1.  Bilateral total knee arthroplasty.   2.  TURP x 2 and bladder scraping.   3.  Anal fistula repair.   4.  Bilateral cataracts.   5.  Facial skin cancer removals.       SOCIAL HISTORY:    -No tobacco or alcohol use.      PHYSICAL EXAMINATION:   GENERAL:  He was alert and oriented x 3.   EYES:  No icterus.  No subconjunctival hemorrhage.   THROAT:  No ulcer.  No bleeding.   NECK:  Supple. No lymphadenopathy.   LUNGS:  Good air entry bilaterally.  No wheezing.   HEART:  Regular.   ABDOMEN:  Soft and nontender.  No mass.   EXTREMITIES:  No calf swelling or tenderness.  No edema.   SKIN:  No petechiae.      LABORATORY DATA:  Reviewed.   -- WBC of 5.1, hemoglobin 8.9 and platelets of 43.   -- Creatinine of 1.16.   -- UA reveals WBC and RBC and leukocyte esterase.      ASSESSMENT:   1.  A 89-year-old gentleman with multiple myeloma admitted with hematuria.   2.  Hematuria secondary to urinary tract infection and thrombocytopenia.   3.  Urinary tract infection.   4.  Thrombocytopenia secondary to multiple myeloma and chemotherapy and ? MDS  5.  Macrocytic anemia secondary to multiple myeloma, chemotherapy and probable myelodysplastic syndrome.   6.  IgM kappa multiple myeloma.   7.  History of prostate cancer.   8.  History of bladder cancer.      RECOMMENDATIONS:   1.  I discussed with the patient regarding hematuria.  Hematuria is due to multiple factors including thrombocytopenia, UTI and bladder cancer.        For UTI, he has been started on antibiotics.  Given history of bladder cancer, he needs a urology consult.  That has already been requested.  Patient may require a cystoscopy.      Patient has thrombocytopenia.  His platelet is 43.  I do not think thrombocytopenia is contributing significantly to his hematuria as he does not have any bleeding from any other site or any  easy bruising.  At this time, I would simply recommend monitoring it.  If his platelet goes below 30, will plan on transfusing platelets.      2.  Patient has chronic thrombocytopenia. This is secondary to multiple myeloma, chemotherapy and possible myelodysplastic syndrome. At this time, I would simply recommend monitoring his CBC.  His thrombocytopenia should improve as the side effect of chemotherapy wears off.      3.  The patient has macrocytic anemia.  This is multifactorial from multiple myeloma, chemotherapy and probable myelodysplastic syndrome.  Will monitor CBC and transfuse if hemoglobin is below 7.7.      4.  Patient has multiple myeloma.  He has been on multiple different treatments.  Most recently he was on Velcade and dexamethasone.  I will hold his treatment because of hospitalization.  It will be resumed in next 1-2 weeks.        5. Patient has a history of prostate cancer.  Given his age and multiple myeloma, would not recommend any further workup including PSA.      6.  Patient had a few questions, which were all answered.  We will continue to follow him.      Thanks for the consult.      Total time spent 45 minutes, more than 50% time spent in counseling and coordination of care.         NEW CLEMENTS MD             D: 2018   T: 2018   MT: RAVI      Name:     JACOB MEDELLIN   MRN:      [id number]        Account:       [id number]   :      1926           Consult Date:  2018      Document: [id number]       cc: JONAH Reich MD

## 2018-05-07 NOTE — H&P
Admitted:     05/07/2018      PRIMARY CARE PROVIDER:  Dickson Reich MD      HEMATOLOGIST/ONCOLOGIST:  Gretel Quinn MD with [facility].      CHIEF COMPLAINT:  Hematuria.      HISTORY OF PRESENT ILLNESS:  Mr. Roc Parkinson is a 89-year-old male with past medical history significant for multiple myeloma; coronary artery disease; essential hypertension; hyperlipidemia; diabetes mellitus type 2; insulin-dependent; chronic kidney disease, stage 3; paroxysmal atrial fibrillation, not on chronic anticoagulation; hypothyroidism; iron deficiency anemia; myelodysplastic syndrome; vitamin D deficiency; irritable bowel syndrome; heart failure with preserved ejection fraction; chemotherapy-induced cytopenia; gastroesophageal reflux disease; history of urinary tract infection due to ESBL and remote history of prostate and bladder cancer, who presents on 05/07/2018 with progressive hematuria.  The patient reports a 7-day history of dysuria, increased urinary frequency and hematuria which has been worsening.  The patient reports small blood clots this morning which prompted his visit to the Emergency Department.  The patient had informed Dr. Quinn on Friday, 05/04/2018 of hematuria, who recommended at that time for the patient to present to the Emergency Department; however, the patient reports he did not present on that day.  The patient also reports increased weakness, fatigue and lightheadedness.  The patient attributes his lightheadedness recently secondary to poor liquid intake while taking his Lasix.  While in the Emergency Department, the patient's laboratory studies noted a platelet of 43, hemoglobin 8.9, white blood cell count 5.1, creatinine 1.16, with his urinary analysis noting moderate blood, large leukocyte esterase, WBC greater than 182, red blood cells greater than 182.  The patient also received a dose of Invanz IV.  The Emergency Department provider notified Dr. Quinn of the patient's presence, who deferred  requirement for a platelet transfusion or additional blood product transfusions at this time.      Presently patient is seen in the Emergency Department with his twin brother at the bedside.  The patient lying on the Emergency Department cart in no apparent distress.  The patient reports no chest pain, shortness of breath, nausea, vomiting, diarrhea, constipation, fevers or chills.  The patient's brother reports recent falls while in his independent senior living house and is concerned about the patient's disposition upon discharge.  Lastly, the patient was recently admitted in 03/2018 for paroxysmal atrial fibrillation with rapid ventricular response while receiving chemotherapy as an outpatient and was subsequently admitted inpatient at that time.  The patient was found to have new onset heart failure with preserved ejection fraction.      PAST MEDICAL HISTORY:   1.  Coronary artery disease.   2.  Essential hypertension.   3.  Hyperlipidemia.   4.  Diabetes mellitus type 2.   5.  Chronic kidney disease, stage 3.   6.  Paroxysmal atrial fibrillation, not on chronic anticoagulation for approximately 1 year due to gastrointestinal bleeding.   7.  History of prostate and bladder cancer.   8.  Multiple myeloma diagnosed in 11/2016, has received chemotherapy.   9.  Osteoarthritis.   10.  History of urinary tract infection due to ESBL.   11.  Hypothyroidism.   12.  Iron deficiency anemia.   13.  Myelodysplastic syndrome.   14.  Vitamin D deficiency.   15.  Irritable bowel syndrome.   16.  Heart failure with preserved ejection fraction, EF 55-60%.   17.  Chemotherapy-induced pancytopenia.   18.  Gastroesophageal reflux disease.      PAST SURGICAL HISTORY:   1.  Bilateral total knee arthroplasty.   2.  TURP x 2 and bladder scraping.   3.  Anal fistula repair.   4.  Bilateral cataracts.   5.  Facial skin cancer removals.      SOCIAL HISTORY:  No tobacco, alcohol or illicit drug use.  Lives in senior housing independently.   The patient ambulates with use of a walker.      FAMILY HISTORY:  Father, diabetes mellitus.      ALLERGIES:  NO KNOWN DRUG ALLERGIES.      MEDICATIONS:    Prior to Admission medications    Medication Sig Last Dose Taking? Auth Provider   ACYCLOVIR PO Take 400 mg by mouth 2 times daily Oncology to decide when stop date will be 5/7/2018 at am Yes Reported, Patient   amLODIPine (NORVASC) 10 MG tablet Take 1 tablet (10 mg) by mouth daily 5/7/2018 at am Yes Cindy Kitchen MD   Ascorbic Acid (VITAMIN C PO) Take 500 mg by mouth daily  5/7/2018 at am Yes Unknown, Entered By History   dexamethasone (DECADRON) 4 MG tablet Take 10 tablets (40 mg) by mouth on Days 1, 8, and 15.  Yes Gretel Quinn MD   ferrous sulfate (IRON) 325 (65 FE) MG tablet Take 325 mg by mouth daily (with breakfast) 5/7/2018 at am Yes Unknown, Entered By History   furosemide (LASIX) 20 MG tablet TAKE 1 TABLET BY MOUTH EVERY DAY 5/7/2018 at am Yes Dickson Reich MD   gemfibrozil (LOPID) 600 MG tablet TAKE 1 TABLET BY MOUTH TWICE DAILY 5/7/2018 at am Yes Dickson Reich MD   INSULIN ASPART SC Inject 13 Units Subcutaneous every morning  5/7/2018 at am Yes Reported, Patient   INSULIN ASPART SC Inject 11 Units Subcutaneous daily (before lunch)  5/6/2018 at 1200 Yes Reported, Patient   INSULIN ASPART SC Inject Subcutaneous At Bedtime Inject 4 unit subcutaneously at bedtime for Diabetes II Ok to use Humalog insulin with same order details 5/6/2018 at hs Yes Reported, Patient   INSULIN ASPART SC Inject 9 Units Subcutaneous daily (with dinner) 5/6/2018 at pm Yes Unknown, Entered By History   INSULIN ASPART SC Inject Subcutaneous 4 times daily Sliding scale:  140-175 1 unit  176-210 2 units  211-245 3 units  246-280 4 units  281-315 5 units  316-350 6 units  351-385 7 units  386-420 8 units  >420 9 units 5/6/2018 at Unknown time Yes Unknown, Entered By History   insulin detemir (LEVEMIR FLEXPEN/FLEXTOUCH) 100 UNIT/ML injection Inject 10  Units Subcutaneous every morning (before breakfast) 5/7/2018 at am Yes Cindy Kitchen MD   levothyroxine (SYNTHROID/LEVOTHROID) 25 MCG tablet TAKE 1 TABLET BY MOUTH EVERY DAY 5/7/2018 at am Yes Dickson Reich MD   LISINOPRIL PO Take 15 mg by mouth daily 5/7/2018 at am Yes Reported, Patient   LORazepam (ATIVAN) 0.5 MG tablet Take 1 tablet (0.5 mg) by mouth every 4 hours as needed (Anxiety, Nausea/Vomiting or Sleep)  at prn Yes Gretel Quinn MD   metoprolol succinate (TOPROL-XL) 50 MG 24 hr tablet Take 1 tablet (50 mg) by mouth daily 5/7/2018 at am Yes Cindy Kitchen MD   polyethylene glycol (MIRALAX/GLYCOLAX) Packet Take 17 g by mouth 2 times daily as needed (constipation)  at prn Yes Kellee Melo MD   prochlorperazine (COMPAZINE) 10 MG tablet Take 1 tablet (10 mg) by mouth every 6 hours as needed (Nausea/Vomiting)  at prn Yes Gretel Quinn MD   Selenium 200 MCG TABS Take 100 mcg by mouth daily. Pt takes one half tab of the 200 mcg daily  5/7/2018 at am Yes Reported, Patient   VITAMIN D, CHOLECALCIFEROL, PO Take 1,000 Units by mouth daily  5/7/2018 at am Yes Reported, Patient   B-D U/F 31G X 8 MM insulin pen needle USE 5 PEN NEEDLES PER DAY OR AS DIRECTED   Dickson Reich MD   Blood Glucose Monitoring Suppl MISC 3 times daily (before meals)    Reported, Patient   FREESTYLE LITE test strip USE TO TEST FOUR TIMES DAILY   Dickson Reich MD     REVIEW OF SYSTEMS:  A 10-point review of systems was completed.  All pertinent positives were noted in the HPI, all other systems negative.      PHYSICAL EXAMINATION:   VITAL SIGNS:  Reviewed and are as follows:  Temperature 98.1, blood pressure 118/63, heart rate 83, respiratory rate 18, O2 sats 97% on room air.   CONSTITUTIONAL:  The patient lying on the Emergency Department cart, awake, alert, cooperative, in no apparent distress and appears stated age.   HEENT:  Pupils equal, round and reactive to light.  Extraocular muscles are  intact.  Sclerae are clear.  Normocephalic, atraumatic.  Oropharynx with moist mucous membranes.   NECK:  Supple, no adenopathy.  Normal range of motion, no nuchal rigidity.   LUNGS:  No increased work of breathing.  Clear to auscultation bilaterally.  No crackles or wheezes noted.   CARDIOVASCULAR:  Normal apical pulse, regular rate and rhythm, normal S1 and S2.  No murmur, rub or gallop noted.   ABDOMEN:  Normal bowel sounds, soft, nondistended, nontender.  No masses are palpated.  No guarding or rebound tenderness noted.   EXTREMITIES:  Moves all 4 extremities.  Dorsalis pedis and radial pulses palpable bilaterally.  Lower extremities with faint trace edema.   NEUROLOGIC:  Awake, alert, oriented.  Cranial nerves II-XII are grossly intact.  Sensory is intact.  Speech fluent.   SKIN:  Warm and dry.  No lesions or rash noted.  No erythema.   PSYCHIATRIC:  Mentation appears normal in affect.      LABORATORY DATA:  Reviewed in Epic.      ASSESSMENT AND PLAN:  Mr. Roc Parkinson is a 89-year-old male with past medical history significant for multiple myeloma; myelodysplastic syndrome; coronary artery disease; essential hypertension; hyperlipidemia; diabetes mellitus, type 2 on insulin; chronic kidney disease, stage 3; paroxysmal atrial fibrillation, not on anticoagulation; hypothyroidism; iron deficiency anemia; vitamin D deficiency; irritable bowel syndrome; heart failure with preserved ejection fraction; chemotherapy-induced pancytopenia; gastroesophageal reflux disease; history of urinary tract infection with extended-spectrum beta-lactamase and history of prostate and bladder cancer, who presents on 05/07/2018 with progressive hematuria.  The patient is being admitted for further evaluation and management.     Urinary tract infection.  History of extended-spectrum beta-lactamase urinary tract infection.  Hematuria possibly secondary to above and thrombocytopenia.   -- The patient reports a 7-day history of  hematuria with noted small blood clots this morning prompting his visit to the Emergency Department.  The patient's urinalysis noted moderate blood, large leukocyte esterase, WBC greater than 182 and red blood cells greater than 182; urine culture and sensitivities pending.   -- The patient received Invanz 1 gram IV while in the Emergency Department, will continue every 24 hours.   -- Will consult Infectious Disease to assist with antibiotic management, appreciate recommendations and assistance.   -- Will consult Urology to assist with further evaluation of hematuria in setting of history of prostate/bladder cancer.  -- Platelets on admission 43 with wandering baseline from 60s-150s, will repeat CBC in am.  -- Per patient's oncologist, will defer platelet transfusion at this time.     Multiple myeloma with myelodysplastic syndrome.   Chemotherapy-induced pancytopenia.   History of prostate and bladder cancer.   -- Will consult Hematology/Oncology, Dr. Quinn with [facility], appreciate assistance with management of above diagnoses.   -- Will repeat CBC in a.m.   -- Transfer platelets if <30 or PRBC if hemoglobin <7.0 per hematology/oncology recommendations.  -- The patient has been typed and screened and will ensure blood consent has been completed.     Iron deficiency anemia.   -- Continue prior to admission ferrous sulfate.   -- Hemoglobin on admission 8.9 with baseline ranging between 8 and 10, will repeat CBC in a.m.  .   -- Type and screen completed on admission, will ensure the blood consent form has been completed.     -- Will defer any transfusion of products to Hematology/Oncology.     Progressive weakness in setting of multiple myeloma and urinary tract infection.  -- Will consult PT/OT to assist with patient's overall function.  -- Will consult  as patient currently resides independently, has had falls recently in the home, and family also expressed concerns with patient being discharged back to  home; requesting assisted living.  -- Fall precautions.    Essential hypertension.   Coronary artery disease.   Heart failure with preserved ejection fraction, EF 55-60%.   Lightheadedness likely secondary to dehydration in setting of administration of oral diuretic with poor fluid intake.  -- Will continue patient's prior to admission amlodipine, lisinopril and Toprol-XL with hold parameters.   -- PRN hydralazine ordered.  -- Will hold patient's prior to admission Lasix in the setting of mild suspected dehydration and defer IVF at this time (does not appear diffusely dry on exam, VSS).   -- Ordered for orthostatic vital signs to be completed on arrival to unit 1 time.   -- Fall precautions.     Paroxysmal atrial fibrillation, not on chronic anticoagulation.   -- The patient was discontinued off of chronic anticoagulation approximately 1 year ago due to gastrointestinal bleeding.   -- Continue prior to admission Toprol-XL with hold parameters.   -- PRN metoprolol ordered.    Diabetes mellitus type 2.  -- Recent hemoglobin A1c 5.5 on 03/22/2018.   -- Will resume prior to admission prandial and basal insulin.   -- Will order a medium sliding scale intensity insulin with meals and at bedtime.   -- Order a point of care glucose to obtain before meals, bedtime, 0200 and p.r.n.   -- Hypoglycemia protocol ordered.     Hyperlipidemia.   -- Continue prior to admission gemfibrozil.     Chronic kidney disease, stage 3.   -- Creatinine on admission 1.16, baseline ranges from 0.9-1.1.   -- Will repeat BMP in a.m.   -- Avoid nephrotoxic agents as deemed appropriate.   -- Holding prior to admission diuretic at this time.     Hypothyroidism.   -- Recent TSH on 04/25/2018 was 10.08; the patient reported at that time that his PCP recommended for patient to take 2 tablets on Mondays and Fridays and 1 tab remaining days and to follow up with PCP in May for repeat laboratory studies.   -- Continue prior to admission levothyroxine.   --  "We will continue to monitor.     Vitamin D deficiency.   -- Will continue prior to admission vitamin D.     History of gastroesophageal reflux disease.   -- The patient reports he is on \"something\" for above diagnosis; however, no noted medications on medication reconciliation.  Will continue to monitor.     Pain Assessment.  Current Pain Score 2018   Patient currently in pain? -   Pain score (0-10) 0   Pain location -   Pain descriptors -   Jacob s pain level was assessed and he currently denies pain.      Deep vein thrombosis prophylaxis.     -- Mechanical PCDs in the setting of hematuria.      CODE STATUS:  DNR/DNI, and this was discussed and confirmed with the patient and his brother at the bedside.      DISPOSITION:  Anticipate greater than 2 midnights in setting of worsening hematuria, urinary tract infection requiring IV antibiotics and thrombocytopenia.      This patient was discussed with Dr. Jayson Howell of the Hospitalist Service who agrees with current plans as outlined above.         JAYSON HOWELL MD       As dictated by JONAH TAVAREZ NP            D: 2018   T: 2018   MT: RAVI      Name:     JACOB MEDELLIN   MRN:      [id number]        Account:      [id number]   :      1926        Admitted:     2018                   Document: [id number]      "

## 2018-05-07 NOTE — PHARMACY-PHARMACOTHERAPY NOTE
"The following home medications were NOT continued on inpatient admission per \"Discontinuation of nonessential home medications during hospitalization\" policy: vitamin C, selenium    If a therapeutic holiday is deemed inappropriate per the prescriber, please notify the pharmacist regarding the medication order.    The pharmacist is available to answer any questions and/or concerns the patient may have regarding discontinuation of non-essential medications.    Please ensure that these medications are restarted as needed upon discharge via the medication reconciliation discharge process and included on the discharge medication reconciliation report.    Thank you,  Viv Orosco [facility]  "

## 2018-05-07 NOTE — CONSULTS
[facility]    Infectious Disease Consultation     Date of Admission:  5/7/2018  Date of Consult (When I saw the patient): 05/07/18    Assessment & Plan   Roc Parkinson is a 89 year old male who was admitted on 5/7/2018.     Impression:  1. 89 y.o with MM  2. History of bladder and prostrate cancer.   3. Admitted with dysuria.   4. H/o of ESBL infection.   5. UA dirty, UC pending.   6. Started on Ertapenem.     Recommendations:   Agree with Ertapenem, will follow up on the cultures.       Lor Cooper MD    Reason for Consult   Reason for consult: I was asked by Darrell Cole CNP  to evaluate this patient for hematuria.    Primary Care Physician   Dickson Reich    Chief Complaint   Dysuria hematuria     History is obtained from the patient and medical records    History of Present Illness   Roc Parkinson is a 89 year old male with multiple myeloma; coronary artery disease; essential hypertension; hyperlipidemia; diabetes mellitus type 2; insulin-dependent; chronic kidney disease, stage 3; paroxysmal atrial fibrillation, not on chronic anticoagulation; hypothyroidism; iron deficiency anemia; myelodysplastic syndrome; vitamin D deficiency; irritable bowel syndrome; heart failure with preserved ejection fraction; chemotherapy-induced cytopenia; gastroesophageal reflux disease; history of urinary tract infection due to ESBL and remote history of prostate and bladder cancer, who presents on 05/07/2018 with progressive hematuria.  The patient reports a 7-day history of dysuria, increased urinary frequency and hematuria which has been worsening.    Past Medical History   I have reviewed this patient's medical history and updated it with pertinent information if needed.   Past Medical History:   Diagnosis Date     Arthritis      Atrial fibrillation (H)      Blood transfusion      Diabetes mellitus (H)      Heart disease 2014    AFIB     Hyperlipidemia      Hypertension      Hypothyroidism  8/21/2014     Malignant neoplasm (H)     bladder, prostate, and melanoma on back       Past Surgical History   I have reviewed this patient's surgical history and updated it with pertinent information if needed.  Past Surgical History:   Procedure Laterality Date     ARTHROPLASTY KNEE  11/5/2012    Procedure: ARTHROPLASTY KNEE;  RIGHT TOTAL KNEE ARTHROPLASTY (SMITH & NEPHEW)^;  Surgeon: Dickson Schulte MD;  Location:  OR     BIOPSY      bladder     COLONOSCOPY  2007     COLONOSCOPY N/A 11/15/2016    Procedure: COMBINED COLONOSCOPY, SINGLE OR MULTIPLE BIOPSY/POLYPECTOMY BY BIOPSY;  Surgeon: Kenneth Fulton MD;  Location:  GI     GENITOURINARY SURGERY      TURP x2 and bladder scraping     GI SURGERY      anal fistula     ORTHOPEDIC SURGERY      lt knee in nov.2009     PHACOEMULSIFICATION CLEAR CORNEA WITH STANDARD INTRAOCULAR LENS IMPLANT Left 10/25/2017    Procedure: PHACOEMULSIFICATION CLEAR CORNEA WITH STANDARD INTRAOCULAR LENS IMPLANT;  LEFT EYE PHACOEMULSIFICATION CLEAR CORNEA WITH STANDARD INTRAOCULAR LENS IMPLANT ;  Surgeon: Shady Haider MD;  Location:  EC     PHACOEMULSIFICATION CLEAR CORNEA WITH STANDARD INTRAOCULAR LENS IMPLANT Right 11/1/2017    Procedure: PHACOEMULSIFICATION CLEAR CORNEA WITH STANDARD INTRAOCULAR LENS IMPLANT;  RIGHT EYE PHACOEMULSIFICATION CLEAR CORNEA WITH STANDARD INTRAOCULAR LENS IMPLANT;  Surgeon: Shady Haider MD;  Location:  EC     SOFT TISSUE SURGERY      melanoma       Prior to Admission Medications   Prior to Admission Medications   Prescriptions Last Dose Informant Patient Reported? Taking?   ACYCLOVIR PO 5/7/2018 at am Self Yes Yes   Sig: Take 400 mg by mouth 2 times daily Oncology to decide when stop date will be   Ascorbic Acid (VITAMIN C PO) 5/7/2018 at am Self Yes Yes   Sig: Take 500 mg by mouth daily    B-D U/F 31G X 8 MM insulin pen needle   No No   Sig: USE 5 PEN NEEDLES PER DAY OR AS DIRECTED   Blood Glucose Monitoring Suppl MISC   Yes No    Sig: 3 times daily (before meals)    FREESTYLE LITE test strip   No No   Sig: USE TO TEST FOUR TIMES DAILY   INSULIN ASPART SC 5/7/2018 at am Self Yes Yes   Sig: Inject 13 Units Subcutaneous every morning    INSULIN ASPART SC 5/6/2018 at 1200 Self Yes Yes   Sig: Inject 11 Units Subcutaneous daily (before lunch)    INSULIN ASPART SC 5/6/2018 at hs Self Yes Yes   Sig: Inject Subcutaneous At Bedtime Inject 4 unit subcutaneously at bedtime for Diabetes II Ok to use Humalog insulin with same order details   INSULIN ASPART SC 5/6/2018 at pm Self Yes Yes   Sig: Inject 9 Units Subcutaneous daily (with dinner)   INSULIN ASPART SC 5/6/2018 at Unknown time Self Yes Yes   Sig: Inject Subcutaneous 4 times daily Sliding scale:  140-175 1 unit  176-210 2 units  211-245 3 units  246-280 4 units  281-315 5 units  316-350 6 units  351-385 7 units  386-420 8 units  >420 9 units   LEVOTHYROXINE SODIUM PO   Yes Yes   Sig: Take 25 mcg by mouth five times a week Tuesday, Wednesday, Thursday, Saturday, Sunday   LISINOPRIL PO 5/7/2018 at am Self Yes Yes   Sig: Take 15 mg by mouth daily   LORazepam (ATIVAN) 0.5 MG tablet  at prn Self No Yes   Sig: Take 1 tablet (0.5 mg) by mouth every 4 hours as needed (Anxiety, Nausea/Vomiting or Sleep)   Levothyroxine Sodium (SYNTHROID PO)   Yes Yes   Sig: Take 50 mcg by mouth twice a week Monday and Friday   Selenium 200 MCG TABS 5/7/2018 at am Self Yes Yes   Sig: Take 100 mcg by mouth daily. Pt takes one half tab of the 200 mcg daily    VITAMIN D, CHOLECALCIFEROL, PO 5/7/2018 at am Self Yes Yes   Sig: Take 1,000 Units by mouth daily    amLODIPine (NORVASC) 10 MG tablet 5/7/2018 at am Self No Yes   Sig: Take 1 tablet (10 mg) by mouth daily   dexamethasone (DECADRON) 4 MG tablet  Self No Yes   Sig: Take 10 tablets (40 mg) by mouth on Days 1, 8, and 15.   ferrous sulfate (IRON) 325 (65 FE) MG tablet 5/7/2018 at am Self Yes Yes   Sig: Take 325 mg by mouth daily (with breakfast)   furosemide (LASIX) 20 MG  tablet 5/7/2018 at am Self No Yes   Sig: TAKE 1 TABLET BY MOUTH EVERY DAY   gemfibrozil (LOPID) 600 MG tablet 5/7/2018 at am Self No Yes   Sig: TAKE 1 TABLET BY MOUTH TWICE DAILY   insulin detemir (LEVEMIR FLEXPEN/FLEXTOUCH) 100 UNIT/ML injection 5/7/2018 at am Self No Yes   Sig: Inject 10 Units Subcutaneous every morning (before breakfast)   metoprolol succinate (TOPROL-XL) 50 MG 24 hr tablet 5/7/2018 at am Self No Yes   Sig: Take 1 tablet (50 mg) by mouth daily   polyethylene glycol (MIRALAX/GLYCOLAX) Packet  at prn Self No Yes   Sig: Take 17 g by mouth 2 times daily as needed (constipation)   prochlorperazine (COMPAZINE) 10 MG tablet  at prn Self No Yes   Sig: Take 1 tablet (10 mg) by mouth every 6 hours as needed (Nausea/Vomiting)      Facility-Administered Medications: None     Allergies   No Known Allergies    Immunization History   Immunization History   Administered Date(s) Administered     Influenza (High Dose) 3 valent vaccine 10/01/2015, 10/01/2016, 10/04/2017     Influenza (IIV3) PF 10/01/2013, 09/25/2014     Pneumo Conj 13-V (2010&after) 10/01/2015     Pneumococcal 23 valent 01/01/2010     TD (ADULT, 7+) 08/11/2009       Social History   I have reviewed this patient's social history and updated it with pertinent information if needed. Roc Parkinson  reports that he has never smoked. He has never used smokeless tobacco. He reports that he does not drink alcohol or use illicit drugs.    Family History   I have reviewed this patient's family history and updated it with pertinent information if needed.   Family History   Problem Relation Age of Onset     Cardiovascular Father 81     heart arrythmia     Endocrine Disease Brother 74     Paget's       Review of Systems   The 10 point Review of Systems is negative other than noted in the HPI or here.     Physical Exam   Temp: 98  F (36.7  C) Temp src: Oral BP: 106/50 (Standing) Pulse: 85 Heart Rate: 70 Resp: 18 SpO2: 99 % O2 Device: None (Room air)    Vital  Signs with Ranges  Temp:  [98  F (36.7  C)-98.1  F (36.7  C)] 98  F (36.7  C)  Pulse:  [85] 85  Heart Rate:  [67-77] 70  Resp:  [18] 18  BP: (106-154)/(50-73) 106/50  SpO2:  [94 %-99 %] 99 %  196 lbs 3.2 oz  Body mass index is 28.97 kg/(m^2).    GENERAL APPEARANCE:  alert and no distress  EYES: Eyes grossly normal to inspection, PERRL and conjunctivae and sclerae normal  HENT: ear canals and TM's normal and nose and mouth without ulcers or lesions  NECK: no adenopathy, no asymmetry, masses, or scars and thyroid normal to palpation  RESP: lungs clear to auscultation - no rales, rhonchi or wheezes  CV: regular rates and rhythm, normal S1 S2, no S3 or S4 and no murmur, click or rub  LYMPHATICS: normal ant/post cervical and supraclavicular nodes  ABDOMEN: soft, nontender, without hepatosplenomegaly or masses and bowel sounds normal  MS: extremities normal- no gross deformities noted  SKIN: no suspicious lesions or rashes      Data   Lab Results   Component Value Date    WBC 5.1 05/07/2018    HGB 8.9 (L) 05/07/2018    HCT 27.3 (L) 05/07/2018    PLT 43 (LL) 05/07/2018     05/07/2018    POTASSIUM 4.2 05/07/2018    CHLORIDE 103 05/07/2018    CO2 24 05/07/2018    BUN 33 (H) 05/07/2018    CR 1.16 05/07/2018     (H) 05/07/2018     (H) 11/17/2016    DD 0.5 03/25/2018    NTBNPI 7826 (H) 03/25/2018    TROPI <0.015 03/22/2018    AST 12 05/02/2018    ALT 13 05/02/2018    ALKPHOS 85 05/02/2018    BILITOTAL 0.4 05/02/2018    INR 1.13 05/07/2018     No results for input(s): CULT in the last 168 hours.  Recent Labs   Lab Test  01/20/18   0439  01/20/18   0424  01/20/18   0314  12/13/17   1049  12/01/17   1600  10/24/17   0929  10/23/17   1219  02/13/17   1530  12/07/16   1447   CULT  No growth  No growth  >100,000 colonies/mL  Escherichia coli ESBL  *  >100,000 colonies/mL  Strain 2  Escherichia coli ESBL  *  Enterobacteriaceae that are susceptible to meropenem are usually susceptible to ertapenem.  ESBL  (extended beta lactamase) producing organisms require contact precautions.  >100,000 colonies/mL  Escherichia coli ESBL  ESBL (extended beta lactamase) producing organisms require contact precautions.  *  50,000 to 100,000 colonies/mL  Escherichia coli ESBL  *  10,000 to 50,000 colonies/mL  Escherichia coli ESBL  *  <10,000 colonies/mL  urogenital janneth    ESBL (extended beta lactamase) producing organisms require contact precautions.  >100,000 colonies/mL  Escherichia coli  *  >100,000 colonies/mL  Escherichia coli  *  10,000 to 50,000 colonies/mL Enterobacter cloacae complex  <10,000 colonies/mL urogenital janneth Susceptibility testing not routinely done  *  No Beta Streptococcus isolated

## 2018-05-07 NOTE — PLAN OF CARE
Problem: Patient Care Overview  Goal: Plan of Care/Patient Progress Review  Outcome: No Change  Care Plan Summary Note: vss, alert x4, denied pain. +BS, lungs clear, on RA. SBA x1. Voiding well. No reported bloody urine. PVR at 1426 0ml. Seen by oncology and urology. Pending ID consult. PIV patent and SL. Skin intact, except for sm open area on left buttock, red blanchable, covered with meplix. . Belongings in room: glasses, keys, clothing, shoes, and cellphone. Monitor.

## 2018-05-07 NOTE — PROGRESS NOTES
RECEIVING UNIT ED HANDOFF REVIEW    ED Nurse Handoff Report was reviewed by: Samir Delgado on May 7, 2018 at 11:29 AM     Chart reviewed, ok to send pt

## 2018-05-07 NOTE — PROGRESS NOTES
Patient seen.  Consult dictated.    89-year-old gentleman with multiple myeloma on treatment admitted with hematuria.  UA is abnormal suggestive of UTI.  CBC reveals macrocytic anemia and thrombocytopenia.  Patient has history of bladder and prostate cancer.  His hematuria is due to UTI, thrombocytopenia and bladder cancer.    Plan:  -Continue antibiotics for UTI.  -Urology consult  -Transfuse platelet if it goes below 30.  -Transfuse PRBC if hemoglobin below 7.0  -We will hold treatment for multiple myeloma this week.

## 2018-05-07 NOTE — LETTER
Transition Communication Hand-off for Care Transitions to Next Level of Care Provider    Name: Roc Parkinson  : 1926  MRN #: [id number]  Primary Care Provider: Dickson Reich  Primary Care MD Name: Dr. Reich  Primary Clinic: [address]  [facility] [address]  Primary Care Clinic Name: [facility]   Reason for Hospitalization:  Acute hemorrhagic cystitis [N30.01]  Generalized muscle weakness [M62.81]  Admit Date/Time: 2018  9:51 AM  Discharge Date: 5/10/2018    Payor Source: Payor: MEDICA / Plan: MEDICA PRIME SOLUTION / Product Type: Indemnity /     Readmission Assessment Measure (MAGUI) Risk Score/category: Elevated             Reason for Communication Hand-off Referral: Fragility  Multiple providers/specialties  Other Discharging home with outpatient IV abx    Discharge Plan:       Concern for non-adherence with plan of care:   Y/N N  Discharge Needs Assessment:  Needs       Most Recent Value    Equipment Currently Used at Home walker, rolling, grab bar [Comfort ht toilet stool]    Transportation Available car    # of Referrals Placed by [facility] Care Coordination, Outpatient Infusion, Scheduled Follow-up appointments, Communication hand-offs to next level of Care Providers          Already enrolled in Tele-monitoring program and name of program:  N/A  Follow-up specialty is recommended: Yes    Follow-up plan:  Future Appointments  Date Time Provider Department Center   2018 12:00 PM [facility] INFUSION CHAIR 16 [facility]   2018 1:00 PM SH INFUSION CHAIR 2 [facility]   2018 11:30 AM SH INFUSION CHAIR 3 [facility]   2018 9:30 AM [facility] INFUSION CHAIR 9 [facility]   2018 10:15 AM Dickson Reich MD [facility]   2018 10:00 AM SH INFUSION CHAIR 16 [facility]   2018 10:00 AM SH INFUSION CHAIR 5 [facility]   2018 9:00 AM SH INFUSION CHAIR 19 [facility]   2018 9:45 AM Gretel Quinn MD [facility]  [facility]   6/13/2018 10:00 AM SH INFUSION CHAIR 1 [facility]   6/20/2018 10:00 AM [facility] IN[facility] 6 [facility]   6/27/2018 10:00 AM [facility] INFUSION CHAIR 3 [facility]   7/5/2018 10:00 AM [facility] INFUSION CHAIR 7 [facility]   7/11/2018 10:00 AM [facility] INF[facility]ION CHAIR 3 [facility]   7[facility]18/2018 10:00 [facility]       Any outstanding tests or procedures:             Key Recommendations:    Pt was admitted with hematuria and found to have Escherichia coli ESBL in his urine culture.  Pt has a hx of ESBL.  Pt was seen by ID and IV abx at discharge was recommended.  Pt will be going to [facility] for 10 days of IV abx.  Pt was seen by Urology and they felt his hematuria was related to his UTI.  Urology follow-up scheduled for 6/22.  Pt will see an Oncology provider next week.  Pt's cancer treatment is on hold as he is treated for the UTI.  Per pt, he is able to drive to his appointments and has a twin brother that is also able to help him with rides.    Thank you for following up with this patient,   Radha Richardson    AVS/Discharge Summary is the source of truth; this is a helpful guide for improved communication of patient story

## 2018-05-07 NOTE — CONSULTS
[facility] Consultation by [facility]    Roc Parkinson MRN# [id number]   Age: 89 year old YOB: 1926     Date of Admission:  5/7/2018    Reason for consult: Gross hematuria       Requesting physician: MARISSA Cole       Level of consult: Consult, follow and place orders           Assessment and Plan:   Assessment:   Gross hematuria, hx of prostate and bladder cancer.       Plan:   U/C pend, ID consulted  PVR normal, does not need Tamayo at this time.  Urine cytology  Will need outpt follow in 4-6 weeks to ensure hematuria has resolved with treatment of (presumed) UTI. Will ask our office to coordinate.    Has declined cystoscopy.   Please call if we can be of further assistance.     Kellee Meeks PA-C  [facility]  Cell: [phone] (text or call, Mon-Wed & Fri until 5pm)               Chief Complaint:   Hematuria     History is obtained from the patient and EMR.         History of Present Illness:   This patient is a 89 year old male who presents to ER with hematuria. He was diagnosed with prostate cancer 27 years ago, initially treated with external beam radiation therapy.  In October 2003.  She was treated with brachytherapy afterloading because of a documented recurrence of the prostate cancer which was considered small volume.  His PSAs had remained low with his most recent follow-up with us in 2015.  He also has a history of recurrent UTIs and urethral stricture, which required dilations, last done in 2014.  He has no voiding symptoms today other than burning and frequency.  He feels that he is emptying his bladder.  Bedside bladder scan is 0 cc.  He also has a history of bladder cancer initially diagnosed in 1986.  Last cystoscopy was done in March 2014.  He declined further surveillance cystoscopies and cytology.    He passed a small clot in the bathroom this morning.  He reports clear urine since then.  Urinalysis revealed pyuria and >182 WBCs.  Urine culture pending.   History of ESBL Escherichia coli UTIs.  Infectious disease has been consulted.  Also noted to have recent falls at assisted living.           Past Medical History:     Past Medical History:   Diagnosis Date     Arthritis      Atrial fibrillation (H)      Blood transfusion      Diabetes mellitus (H)      Heart disease 2014    AFIB     Hyperlipidemia      Hypertension      Hypothyroidism 8/21/2014     Malignant neoplasm (H)     bladder, prostate, and melanoma on back             Past Surgical History:     Past Surgical History:   Procedure Laterality Date     ARTHROPLASTY KNEE  11/5/2012    Procedure: ARTHROPLASTY KNEE;  RIGHT TOTAL KNEE ARTHROPLASTY (SMITH & NEPHEW)^;  Surgeon: Dickson Schlute MD;  Location:  OR     BIOPSY      bladder     COLONOSCOPY  2007     COLONOSCOPY N/A 11/15/2016    Procedure: COMBINED COLONOSCOPY, SINGLE OR MULTIPLE BIOPSY/POLYPECTOMY BY BIOPSY;  Surgeon: Kenneth Fulton MD;  Location:  GI     GENITOURINARY SURGERY      TURP x2 and bladder scraping     GI SURGERY      anal fistula     ORTHOPEDIC SURGERY      lt knee in nov.2009     PHACOEMULSIFICATION CLEAR CORNEA WITH STANDARD INTRAOCULAR LENS IMPLANT Left 10/25/2017    Procedure: PHACOEMULSIFICATION CLEAR CORNEA WITH STANDARD INTRAOCULAR LENS IMPLANT;  LEFT EYE PHACOEMULSIFICATION CLEAR CORNEA WITH STANDARD INTRAOCULAR LENS IMPLANT ;  Surgeon: Shady Haider MD;  Location:  EC     PHACOEMULSIFICATION CLEAR CORNEA WITH STANDARD INTRAOCULAR LENS IMPLANT Right 11/1/2017    Procedure: PHACOEMULSIFICATION CLEAR CORNEA WITH STANDARD INTRAOCULAR LENS IMPLANT;  RIGHT EYE PHACOEMULSIFICATION CLEAR CORNEA WITH STANDARD INTRAOCULAR LENS IMPLANT;  Surgeon: Shady Haider MD;  Location:  EC     SOFT TISSUE SURGERY      melanoma             Social History:     Social History   Substance Use Topics     Smoking status: Never Smoker     Smokeless tobacco: Never Used     Alcohol use No             Family History:     Family History    Problem Relation Age of Onset     Cardiovascular Father 81     heart arrythmia     Endocrine Disease Brother 74     Paget's     Family history reviewed.             Allergies:   No Known Allergies          Medications:     Current Facility-Administered Medications   Medication     acetaminophen (TYLENOL) tablet 650 mg     acyclovir (ZOVIRAX) capsule 400 mg     [START ON 5/8/2018] amLODIPine (NORVASC) tablet 10 mg     bisacodyl (DULCOLAX) Suppository 10 mg     [START ON 5/8/2018] cholecalciferol (vitamin D3) tablet 1,000 Units     glucose gel 15-30 g    Or     dextrose 50 % injection 25-50 mL    Or     glucagon injection 1 mg     [START ON 5/8/2018] ertapenem (INVanz) 1 g vial to attach to  mL bag     [START ON 5/8/2018] ferrous sulfate (IRON) tablet 325 mg     gemfibrozil (LOPID) tablet 600 mg     [START ON 5/8/2018] insulin aspart (NovoLOG) inj (RAPID ACTING)     insulin aspart (NovoLOG) inj (RAPID ACTING)     insulin aspart (NovoLOG) inj (RAPID ACTING)     insulin aspart (NovoLOG) inj (RAPID ACTING)     insulin aspart (NovoLOG) inj (RAPID ACTING)     insulin aspart (NovoLOG) inj (RAPID ACTING)     [START ON 5/8/2018] insulin detemir (LEVEMIR) injection 10 Units     [START ON 5/8/2018] levothyroxine (SYNTHROID/LEVOTHROID) tablet 25 mcg     [START ON 5/11/2018] levothyroxine (SYNTHROID/LEVOTHROID) tablet 50 mcg     lidocaine (LMX4) cream     lidocaine 1 % 1 mL     [START ON 5/8/2018] lisinopril (PRINIVIL/ZESTRIL) tablet 15 mg     melatonin tablet 1 mg     [START ON 5/8/2018] metoprolol succinate (TOPROL-XL) 24 hr tablet 50 mg     naloxone (NARCAN) injection 0.1-0.4 mg     ondansetron (ZOFRAN-ODT) ODT tab 4 mg    Or     ondansetron (ZOFRAN) injection 4 mg     prochlorperazine (COMPAZINE) injection 5 mg    Or     prochlorperazine (COMPAZINE) tablet 5 mg    Or     prochlorperazine (COMPAZINE) Suppository 12.5 mg     senna-docusate (SENOKOT-S;PERICOLACE) 8.6-50 MG per tablet 1 tablet    Or     senna-docusate  "(SENOKOT-S;PERICOLACE) 8.6-50 MG per tablet 2 tablet     sodium chloride (PF) 0.9% PF flush 3 mL     sodium chloride (PF) 0.9% PF flush 3 mL             Review of Systems:   A comprehensive review of systems was performed and found to be negative except as described in this note     /50 (BP Location: Left arm)  Pulse 85  Temp 98  F (36.7  C) (Oral)  Resp 18  Ht 1.753 m (5' 9\")  Wt 89 kg (196 lb 3.2 oz)  SpO2 99%  BMI 28.97 kg/m2  GEN: NAD, lying in bed  HEENT: EOMI  NECK: Supple  ABD: Obese, soft  EXT: No LE edema            Data:     Lab Results   Component Value Date    WBC 5.1 05/07/2018    HGB 8.9 (L) 05/07/2018    HCT 27.3 (L) 05/07/2018     (H) 05/07/2018    PLT 43 (LL) 05/07/2018     Lab Results   Component Value Date    CR 1.16 05/07/2018                  "

## 2018-05-07 NOTE — IP AVS SNAPSHOT
[address], [address]    [facility] [address]    Phone:  [phone]                                       After Visit Summary   5/7/2018    Roc Parkinson    MRN: [id number]           After Visit Summary Signature Page     I have received my discharge instructions, and my questions have been answered. I have discussed any challenges I see with this plan with the nurse or doctor.    ..........................................................................................................................................  Patient/Patient Representative Signature      ..........................................................................................................................................  Patient Representative Print Name and Relationship to Patient    ..................................................               ................................................  Date                                            Time    ..........................................................................................................................................  Reviewed by Signature/Title    ...................................................              ..............................................  Date                                                            Time

## 2018-05-07 NOTE — IP AVS SNAPSHOT
MRN:[id number]                      After Visit Summary   5/7/2018    Roc Parkinson    MRN: [id number]           Thank you!     Thank you for choosing [facility] for your care. Our goal is always to provide you with excellent care. Hearing back from our patients is one way we can continue to improve our services. Please take a few minutes to complete the written survey that you may receive in the mail after you visit with us. Thank you!        Patient Information     Date Of Birth          7/7/1926        About your hospital stay     You were admitted on:  May 7, 2018 You last received care in the:  [place] [address] [facility]    You were discharged on:  May 10, 2018       Who to Call     For medical emergencies, please call 911.  For non-urgent questions about your medical care, please call your primary care provider or clinic, [phone]          Attending Provider     Provider Specialty    Fuentes Conway MD Emergency Medicine    [facility] Internal Medicine       Primary Care Provider Office Phone # Fax #    Dickson Reich [phone]      Follow-up Appointments     Follow-up and recommended labs and tests        Follow up with primary care provider, Dickson Reich, within 7 days for hospital follow- up with repeat CBC and BMP.  Follow-up with oncology as suggested.                  Your next 10 appointments already scheduled     May 11, 2018 12:00 PM CDT   Level 1 with  INFUSION CHAIR 16   [facility] ([facility])    [facility]  [address]  [facility] [address]   [phone]            May 12, 2018  1:00 PM CDT   Level 1 with  INFUSION CHAIR 2   [facility] ([facility])    [facility]  [address]  [place] [address]   [phone]            May 13, 2018 11:30 AM CDT    Level 1 with SH INFUSION CHAIR 3   [facility] ([facility])    [facility] [place]  [address]  [place] [address]   [phone]            May 16, 2018  9:30 AM CDT   Level 1 with SH INFUSION CHAIR 9   [facility] ([facility])    [facility] [place]  [address]  [place] [address]   [phone]            May 16, 2018  2:15 PM CDT   Return Visit with Gretel Quinn MD   [facility] ([facility])    [facility]  [address]  [place] [address]   [phone]            May 22, 2018 10:15 AM CDT   SHORT with Dickson Reich MD   [facility] ([facility])    [address] [place] [address]   [phone]            May 23, 2018 10:00 AM CDT   Level 1 with SH INFUSION CHAIR 16   [facility] ([facility])    [facility] [place] [address]  [place] [address]   [phone]            May 30, 2018 10:00 AM CDT   Level 1 with  INFUSION CHAIR 5   [facility] ([facility])    [facility] [place]  [address]  [place] [address]   [phone]            Jun 06, 2018  9:00 AM CDT   Level 1 with SH INFUSION CHAIR 19   [facility] ([facility])    [facility] [place]  [address]  [place] [address]   [phone]            Jun 06, 2018  9:45 AM CDT   Return Visit with Gretel Quinn MD   [facility] ([facility])    [facility]  [address]  [place] [address]   [phone]              Pending Results     Date and Time Order Name Status  "Description    2018 1049 Urine Culture Aerobic Bacterial Preliminary             Statement of Approval     Ordered          05/10/18 1136  I have reviewed and agree with all the recommendations and orders detailed in this document.  EFFECTIVE NOW     Approved and electronically signed by:  Jayson Howell MD             Admission Information     Date & Time Provider Department Dept. Phone    2018 Jayson Howell MD [place] [address] Oncology [phone]      Your Vitals Were     Blood Pressure Pulse Temperature Respirations Height Weight    124/60 (BP Location: Left arm) 71 97.8  F (36.6  C) (Oral) 16 1.753 m (5' 9\") 88.8 kg (195 lb 12.8 oz)    Pulse Oximetry BMI (Body Mass Index)                96% 28.91 kg/m2          NephrosharGlopho Information     [employer] lets you send messages to your doctor, view your test results, renew your prescriptions, schedule appointments and more. To sign up, go to [email or web address] . Click on \"Log in\" on the left side of the screen, which will take you to the Welcome page. Then click on \"Sign up Now\" on the right side of the page.     You will be asked to enter the access code listed below, as well as some personal information. Please follow the directions to create your username and password.     Your access code is: [email or web address]  Expires: 2018 11:49 AM     Your access code will  in 90 days. If you need help or a new code, please call your [facility] clinic or [phone].        Care EveryWhere ID     This is your Care EveryWhere ID. This could be used by other organizations to access your [facility] medical records  [id number]        Equal Access to Services     [facility] Center[facility] : Hadjuliette Driver, consuelo zamarripa, alyce johnsonalmahanh thomason, melita rodriguez. So [facility] [phone].    ATENCIÓN: Si habla español, tiene a nolan disposición servicios gratuitos de asistencia lingüística. Llame al [phone].    We comply " with applicable federal civil rights laws and [place] laws. We do not discriminate on the basis of race, color, national origin, age, disability, sex, sexual orientation, or gender identity.               Review of your medicines      START taking        Dose / Directions    ertapenem 1 GM injection   Commonly known as:  INVanz   Used for:  Acute hemorrhagic cystitis        Dose:  1 g   Inject 1 g into the vein every 24 hours for 10 days CBC with differential, creatinine, SGOT weekly while on this medication to be faxed to Dr. Shaffer office.   Quantity:  100 mL   Refills:  0         CONTINUE these medicines which may have CHANGED, or have new prescriptions. If we are uncertain of the size of tablets/capsules you have at home, strength may be listed as something that might have changed.        Dose / Directions    * INSULIN ASPART SC   This may have changed:  Another medication with the same name was changed. Make sure you understand how and when to take each.        Dose:  13 Units   Inject 13 Units Subcutaneous every morning   Refills:  0       * INSULIN ASPART SC   This may have changed:  Another medication with the same name was changed. Make sure you understand how and when to take each.        Dose:  11 Units   Inject 11 Units Subcutaneous daily (before lunch)   Refills:  0       * INSULIN ASPART SC   This may have changed:  Another medication with the same name was changed. Make sure you understand how and when to take each.        Inject Subcutaneous At Bedtime Inject 4 unit subcutaneously at bedtime for Diabetes II Ok to use Humalog insulin with same order details   Refills:  0       * INSULIN ASPART SC   This may have changed:  Another medication with the same name was changed. Make sure you understand how and when to take each.        Inject Subcutaneous 4 times daily Sliding scale: 140-175 1 unit 176-210 2 units 211-245 3 units 246-280 4 units 281-315 5 units 316-350 6 units 351-385 7 units 386-420 8  units >420 9 units   Refills:  0       * insulin aspart 100 UNITS/ML injection   Commonly known as:  NovoLOG   This may have changed:    - medication strength  - how much to take   Used for:  Type 2 diabetes mellitus with stage 3 chronic kidney disease, with long-term current use of insulin (H)        Dose:  5 Units   Inject 5 Units Subcutaneous daily (with dinner)   Refills:  0       insulin detemir 100 UNIT/ML injection   Commonly known as:  LEVEMIR FLEXPEN/FLEXTOUCH   This may have changed:  how much to take   Used for:  Type 2 diabetes mellitus with diabetic chronic kidney disease, unspecified CKD stage, unspecified long term insulin use status (H), Uncontrolled type 2 diabetes mellitus with hyperglycemia, unspecified long term insulin use status (H), Type 2 diabetes mellitus with stage 3 chronic kidney disease, with long-term current use of insulin (H)        Dose:  7 Units   Inject 7 Units Subcutaneous every morning (before breakfast)   Quantity:  15 mL   Refills:  1       * Notice:  This list has 5 medication(s) that are the same as other medications prescribed for you. Read the directions carefully, and ask your doctor or other care provider to review them with you.      CONTINUE these medicines which have NOT CHANGED        Dose / Directions    ACYCLOVIR PO        Dose:  400 mg   Take 400 mg by mouth 2 times daily Oncology to decide when stop date will be   Refills:  0       amLODIPine 10 MG tablet   Commonly known as:  NORVASC   Used for:  Essential hypertension        Dose:  10 mg   Take 1 tablet (10 mg) by mouth daily   Quantity:  30 tablet   Refills:  0       B-D U/F 31G X 8 MM   Used for:  Type 2 diabetes mellitus with stage 3 chronic kidney disease, with long-term current use of insulin (H)   Generic drug:  insulin pen needle        USE 5 PEN NEEDLES PER DAY OR AS DIRECTED   Quantity:  500 each   Refills:  1       Blood Glucose Monitoring Suppl Misc        3 times daily (before meals)   Refills:  0        dexamethasone 4 MG tablet   Commonly known as:  DECADRON   Used for:  Multiple myeloma not having achieved remission (H)        Take 10 tablets (40 mg) by mouth on Days 1, 8, and 15.   Quantity:  30 tablet   Refills:  0       ferrous sulfate 325 (65 Fe) MG tablet   Commonly known as:  IRON        Dose:  325 mg   Take 325 mg by mouth daily (with breakfast)   Refills:  0       FREESTYLE LITE test strip   Used for:  Uncontrolled type 1 diabetes mellitus with stage 3 chronic kidney disease (H)   Generic drug:  blood glucose monitoring        USE TO TEST FOUR TIMES DAILY   Quantity:  400 strip   Refills:  0       furosemide 20 MG tablet   Commonly known as:  LASIX   Used for:  Multiple myeloma not having achieved remission (H)        TAKE 1 TABLET BY MOUTH EVERY DAY   Quantity:  30 tablet   Refills:  0       gemfibrozil 600 MG tablet   Commonly known as:  LOPID   Used for:  Hyperlipidemia        TAKE 1 TABLET BY MOUTH TWICE DAILY   Quantity:  180 tablet   Refills:  1       LISINOPRIL PO        Dose:  15 mg   Take 15 mg by mouth daily   Refills:  0       LORazepam 0.5 MG tablet   Commonly known as:  ATIVAN   Used for:  Multiple myeloma not having achieved remission (H)        Dose:  0.5 mg   Take 1 tablet (0.5 mg) by mouth every 4 hours as needed (Anxiety, Nausea/Vomiting or Sleep)   Quantity:  30 tablet   Refills:  3       metoprolol succinate 50 MG 24 hr tablet   Commonly known as:  TOPROL-XL   Used for:  Atrial fibrillation with rapid ventricular response (H)        Dose:  50 mg   Take 1 tablet (50 mg) by mouth daily   Quantity:  30 tablet   Refills:  0       polyethylene glycol Packet   Commonly known as:  MIRALAX/GLYCOLAX   Used for:  Slow transit constipation        Dose:  17 g   Take 17 g by mouth 2 times daily as needed (constipation)   Quantity:  7 packet   Refills:  0       prochlorperazine 10 MG tablet   Commonly known as:  COMPAZINE   Used for:  Multiple myeloma not having achieved remission (H)         Dose:  10 mg   Take 1 tablet (10 mg) by mouth every 6 hours as needed (Nausea/Vomiting)   Quantity:  30 tablet   Refills:  3       Selenium 200 MCG Tabs        Dose:  100 mcg   Take 100 mcg by mouth daily. Pt takes one half tab of the 200 mcg daily   Refills:  0       * SYNTHROID PO        Dose:  50 mcg   Take 50 mcg by mouth twice a week Monday and Friday   Refills:  0       * LEVOTHYROXINE SODIUM PO        Dose:  25 mcg   Take 25 mcg by mouth five times a week Tuesday, Wednesday, Thursday, Saturday, Sunday   Refills:  0       VITAMIN C PO        Dose:  500 mg   Take 500 mg by mouth daily   Refills:  0       VITAMIN D (CHOLECALCIFEROL) PO        Dose:  1000 Units   Take 1,000 Units by mouth daily   Refills:  0       * Notice:  This list has 2 medication(s) that are the same as other medications prescribed for you. Read the directions carefully, and ask your doctor or other care provider to review them with you.         Where to get your medicines      Some of these will need a paper prescription and others can be bought over the counter. Ask your nurse if you have questions.     You don't need a prescription for these medications     ertapenem 1 GM injection    insulin aspart 100 UNITS/ML injection    insulin detemir 100 UNIT/ML injection                Protect others around you: Learn how to safely use, store and throw away your medicines at www.disposemymeds.org.        ANTIBIOTIC INSTRUCTION     You've Been Prescribed an Antibiotic - Now What?  Your healthcare team thinks that you or your loved one might have an infection. Some infections can be treated with antibiotics, which are powerful, life-saving drugs. Like all medications, antibiotics have side effects and should only be used when necessary. There are some important things you should know about your antibiotic treatment.      Your healthcare team may run tests before you start taking an antibiotic.    Your team may take samples (e.g., from your blood,  urine or other areas) to run tests to look for bacteria. These test can be important to determine if you need an antibiotic at all and, if you do, which antibiotic will work best.      Within a few days, your healthcare team might change or even stop your antibiotic.    Your team may start you on an antibiotic while they are working to find out what is making you sick.    Your team might change your antibiotic because test results show that a different antibiotic would be better to treat your infection.    In some cases, once your team has more information, they learn that you do not need an antibiotic at all. They may find out that you don't have an infection, or that the antibiotic you're taking won't work against your infection. For example, an infection caused by a virus can't be treated with antibiotics. Staying on an antibiotic when you don't need it is more likely to be harmful than helpful.      You may experience side effects from your antibiotic.    Like all medications, antibiotics have side effects. Some of these can be serious.    Let you healthcare team know if you have any known allergies when you are admitted to the hospital.    One significant side effect of nearly all antibiotics is the risk of severe and sometimes deadly diarrhea caused by Clostridium difficile (C. Difficile). This occurs when a person takes antibiotics because some good germs are destroyed. Antibiotic use allows C. diificile to take over, putting patients at high risk for this serious infection.    As a patient or caregiver, it is important to understand your or your loved one's antibiotic treatment. It is especially important for caregivers to speak up when patients can't speak for themselves. Here are some important questions to ask your healthcare team.    What infection is this antibiotic treating and how do you know I have that infection?    What side effects might occur from this antibiotic?    How long will I need to take  this antibiotic?    Is it safe to take this antibiotic with other medications or supplements (e.g., vitamins) that I am taking?     Are there any special directions I need to know about taking this antibiotic? For example, should I take it with food?    How will I be monitored to know whether my infection is responding to the antibiotic?    What tests may help to make sure the right antibiotic is prescribed for me?      Information provided by:  www.cdc.gov/getsmart  U.S. Department of Health and Human Services  Centers for disease Control and Prevention  National Center for Emerging and Zoonotic Infectious Diseases  Division of Healthcare Quality Promotion             Medication List: This is a list of all your medications and when to take them. Check marks below indicate your daily home schedule. Keep this list as a reference.      Medications           Morning Afternoon Evening Bedtime As Needed    ACYCLOVIR PO   Take 400 mg by mouth 2 times daily Oncology to decide when stop date will be   Last time this was given:  400 mg on 5/10/2018  9:09 AM                                amLODIPine 10 MG tablet   Commonly known as:  NORVASC   Take 1 tablet (10 mg) by mouth daily   Last time this was given:  10 mg on 5/10/2018  9:09 AM                                B-D U/F 31G X 8 MM   USE 5 PEN NEEDLES PER DAY OR AS DIRECTED   Generic drug:  insulin pen needle                                Blood Glucose Monitoring Suppl Misc   3 times daily (before meals)                                dexamethasone 4 MG tablet   Commonly known as:  DECADRON   Take 10 tablets (40 mg) by mouth on Days 1, 8, and 15.                                ertapenem 1 GM injection   Commonly known as:  INVanz   Inject 1 g into the vein every 24 hours for 10 days CBC with differential, creatinine, SGOT weekly while on this medication to be faxed to Dr. Shaffer office.                                ferrous sulfate 325 (65 Fe) MG tablet   Commonly known  as:  IRON   Take 325 mg by mouth daily (with breakfast)   Last time this was given:  325 mg on 5/10/2018  9:09 AM                                FREESTYLE LITE test strip   USE TO TEST FOUR TIMES DAILY   Generic drug:  blood glucose monitoring                                furosemide 20 MG tablet   Commonly known as:  LASIX   TAKE 1 TABLET BY MOUTH EVERY DAY   Last time this was given:  20 mg on 5/10/2018  9:11 AM                                gemfibrozil 600 MG tablet   Commonly known as:  LOPID   TAKE 1 TABLET BY MOUTH TWICE DAILY   Last time this was given:  600 mg on 5/10/2018  9:06 AM                                * INSULIN ASPART SC   Inject 13 Units Subcutaneous every morning   Last time this was given:  13 Units on 5/10/2018 12:07 PM                                * INSULIN ASPART SC   Inject 11 Units Subcutaneous daily (before lunch)   Last time this was given:  13 Units on 5/10/2018 12:07 PM                                * INSULIN ASPART SC   Inject Subcutaneous At Bedtime Inject 4 unit subcutaneously at bedtime for Diabetes II Ok to use Humalog insulin with same order details   Last time this was given:  13 Units on 5/10/2018 12:07 PM                                * INSULIN ASPART SC   Inject Subcutaneous 4 times daily Sliding scale: 140-175 1 unit 176-210 2 units 211-245 3 units 246-280 4 units 281-315 5 units 316-350 6 units 351-385 7 units 386-420 8 units >420 9 units   Last time this was given:  13 Units on 5/10/2018 12:07 PM                                * insulin aspart 100 UNITS/ML injection   Commonly known as:  NovoLOG   Inject 5 Units Subcutaneous daily (with dinner)                                insulin detemir 100 UNIT/ML injection   Commonly known as:  LEVEMIR FLEXPEN/FLEXTOUCH   Inject 7 Units Subcutaneous every morning (before breakfast)   Last time this was given:  7 Units on 5/10/2018  6:38 AM                                LISINOPRIL PO   Take 15 mg by mouth daily   Last time this  was given:  15 mg on 5/10/2018  9:09 AM                                LORazepam 0.5 MG tablet   Commonly known as:  ATIVAN   Take 1 tablet (0.5 mg) by mouth every 4 hours as needed (Anxiety, Nausea/Vomiting or Sleep)                                metoprolol succinate 50 MG 24 hr tablet   Commonly known as:  TOPROL-XL   Take 1 tablet (50 mg) by mouth daily   Last time this was given:  50 mg on 5/10/2018  9:09 AM                                polyethylene glycol Packet   Commonly known as:  MIRALAX/GLYCOLAX   Take 17 g by mouth 2 times daily as needed (constipation)                                prochlorperazine 10 MG tablet   Commonly known as:  COMPAZINE   Take 1 tablet (10 mg) by mouth every 6 hours as needed (Nausea/Vomiting)                                Selenium 200 MCG Tabs   Take 100 mcg by mouth daily. Pt takes one half tab of the 200 mcg daily                                * SYNTHROID PO   Take 50 mcg by mouth twice a week Monday and Friday   Last time this was given:  25 mcg on 5/10/2018  6:32 AM                                * LEVOTHYROXINE SODIUM PO   Take 25 mcg by mouth five times a week Tuesday, Wednesday, Thursday, Saturday, Sunday   Last time this was given:  25 mcg on 5/10/2018  6:32 AM                                VITAMIN C PO   Take 500 mg by mouth daily                                VITAMIN D (CHOLECALCIFEROL) PO   Take 1,000 Units by mouth daily   Last time this was given:  1,000 Units on 5/10/2018  9:09 AM                                * Notice:  This list has 7 medication(s) that are the same as other medications prescribed for you. Read the directions carefully, and ask your doctor or other care provider to review them with you.

## 2018-05-08 NOTE — PLAN OF CARE
Problem: Patient Care Overview  Goal: Plan of Care/Patient Progress Review  OT eval and treatment completed on 89yo male admitted with hematuria in setting of multiple myeloma. Pt had recent admit here in March for Aifb w/RVR and multiple myeloma sx management discharging to TCU before returning home. He reports this has been a pattern (hospital admit x 1 week, TCU x 1 wk then home 2-3 wks before re-admit to hospital). [place] of senior apartment building. He is indep all self-cares, amb with 4ww, all household tasks, drives. He does now receive 5 meals per week from [facility].   Discharge Planner OT   Patient plan for discharge: home  Current status: Pt A & O x 4, pleasant and cooperative. BUE strength/ROM WNL. SBA bed mob, amb with FWW SBA in room and tolerated 200+ft x 3 with brief seated rest between each leg. Performed toileting task including transfer w/use of grab bar SBA. Stood at sink for light grooming tasks with set-up SBA.  Pt reports fatigue with activity which he reports is baseline. He states that he does U/LE ex's as assigned by OT/PT at TCU approx 5x/wk using 2# wt for UB and walks long distance in facility to retrieve mail daily. OT encouraged him to increase number of walks in hallway per day and he is agreeable. At this time, pt appears to be at or very near baseline level of function. He does not want to return to TCU and writer also feels not necessary. Neither skilled OT nor PT intervention indicated at this time and orders to be completed.   OT recommends nursing ambulate with patient throughout day--patient in agreement with that plan.   Barriers to return to prior living situation: none noted  Recommendations for discharge: home w/ resumption of HEP for strengthening U/LB and increase in walking program  Rationale for recommendations: pt at or near baseline level of performance, safe to return to current living situation       Entered by: Gurpreet Silva 05/08/2018 9:16  AM

## 2018-05-08 NOTE — PLAN OF CARE
Problem: Patient Care Overview  Goal: Plan of Care/Patient Progress Review  Discharge Planner PT   Patient plan for discharge: Home  Current status: Per chart review and OT evaluation, patient ambulating 200+ feet with FWW. Has been performing home HEP for UE/LE strengthening. No skilled acute care PT needs identified. Pt agreeable to continue walking with RN staff, plan for DC is to home per OT note. Will complete IP PT order at this time.   Barriers to return to prior living situation: None per chart review  Recommendations for discharge: Defer to OT  Rationale for recommendations: Defer to OT       Entered by: Xavier Cheema 05/08/2018 9:27 AM

## 2018-05-08 NOTE — PLAN OF CARE
Problem: Patient Care Overview  Goal: Plan of Care/Patient Progress Review  A&Ox4. VSS. Afebrile. Ortho BP's completed, WDL. Denies pain. Urine bright red with small-medium sized clots noted with each urination this evening shift, PVR at 83cc. Denies burning/discomfort with urination. BM this afternoon. Mod carb diet, BG checks 61 and 83- held prandial and SSI insulin- MD notified, ate 100% of supper and drinking juices. Meplilex to coccyx CDI. Up in chair for evening shift. A1 with WW. Will continue to monitor.

## 2018-05-08 NOTE — PLAN OF CARE
Problem: Patient Care Overview  Goal: Plan of Care/Patient Progress Review  Outcome: No Change  A&O.  VSS.  Denies pain.  Blood sugars stable (AM dose of levamir reduced).  Good appetite.  No hematuria noted this shift.  Cont abx.  Mepilex intact on buttock.  Up with SBA and walker.  PIV saline locked.  Blood counts stable.  Cont to monitor.

## 2018-05-08 NOTE — PROGRESS NOTES
[facility]    Infectious Disease Progress Note    Date of Service (when I saw the patient): 05/08/2018     Assessment & Plan   Roc Parkinson is a 89 year old male who was admitted on 5/7/2018.   Impression:  1. 89 y.o with MM  2. History of bladder and prostrate cancer.   3. Admitted with dysuria.   4. H/o of ESBL infection.   5. UA dirty, UC pending.   6. Started on Ertapenem.      Recommendations:   Continue Ertapenem, will follow up on the cultures        Lor Cooper MD    Interval History   Remains afebrile   Reports of blood in urine, clots   UC pending     Physical Exam   Temp: 95.4  F (35.2  C) Temp src: Axillary BP: 140/62 Pulse: 69 Heart Rate: 76 Resp: 18 SpO2: 96 % O2 Device: None (Room air)    Vitals:    05/07/18 0956 05/07/18 1207 05/08/18 0706   Weight: 90.7 kg (200 lb) 89 kg (196 lb 3.2 oz) 89.3 kg (196 lb 12.8 oz)     Vital Signs with Ranges  Temp:  [95.4  F (35.2  C)-98.2  F (36.8  C)] 95.4  F (35.2  C)  Pulse:  [69-85] 69  Heart Rate:  [67-77] 76  Resp:  [16-18] 18  BP: (106-169)/(50-78) 140/62  SpO2:  [94 %-99 %] 96 %    Constitutional: Awake, alert, cooperative, no apparent distress  Lungs: Clear to auscultation bilaterally, no crackles or wheezing  Cardiovascular: Regular rate and rhythm, normal S1 and S2, and no murmur noted  Abdomen: Normal bowel sounds, soft, non-distended, non-tender  Skin: No rashes, no cyanosis, no edema  Other:    Medications       acyclovir (ZOVIRAX) capsule 400 mg  400 mg Oral BID     amLODIPine  10 mg Oral Daily     cholecalciferol  1,000 Units Oral Daily     ertapenem (INVanz) IV  1 g Intravenous Q24H     ferrous sulfate  325 mg Oral Daily with breakfast     gemfibrozil  600 mg Oral BID     insulin aspart  13 Units Subcutaneous QAM     insulin aspart  11 Units Subcutaneous Daily before lunch     insulin aspart  4 Units Subcutaneous At Bedtime     insulin aspart  9 Units Subcutaneous Daily with supper     insulin aspart  1-7 Units Subcutaneous TID  AC     insulin aspart  1-5 Units Subcutaneous At Bedtime     [START ON 5/9/2018] insulin detemir  7 Units Subcutaneous QAM AC     levothyroxine (SYNTHROID/LEVOTHROID) tablet 25 mcg  25 mcg Oral Once per day on Sun Tue Wed Thu Sat     [START ON 5/11/2018] levothyroxine (SYNTHROID/LEVOTHROID) tablet 50 mcg  50 mcg Oral Once per day on Mon Fri     lisinopril (PRINIVIL/ZESTRIL) tablet 15 mg  15 mg Oral Daily     metoprolol succinate  50 mg Oral Daily     sodium chloride (PF)  3 mL Intracatheter Q8H       Data   All microbiology laboratory data reviewed.  Recent Labs   Lab Test  05/07/18   1012  05/02/18   1017  04/25/18   0808   WBC  5.1  6.3  4.9   HGB  8.9*  9.0*  9.2*   HCT  27.3*  27.5*  28.0*   MCV  110*  109*  108*   PLT  43*  46*  85*     Recent Labs   Lab Test  05/07/18   1012  05/02/18   1017  04/11/18   0806   CR  1.16  0.96  0.94     Recent Labs   Lab Test  11/17/16   0938   SED  126*     Recent Labs   Lab Test  01/20/18   0439  01/20/18   0424  01/20/18   0314  12/13/17   1049  12/01/17   1600  10/24/17   0929  10/23/17   1219  02/13/17   1530  12/07/16   1447   CULT  No growth  No growth  >100,000 colonies/mL  Escherichia coli ESBL  *  >100,000 colonies/mL  Strain 2  Escherichia coli ESBL  *  Enterobacteriaceae that are susceptible to meropenem are usually susceptible to ertapenem.  ESBL (extended beta lactamase) producing organisms require contact precautions.  >100,000 colonies/mL  Escherichia coli ESBL  ESBL (extended beta lactamase) producing organisms require contact precautions.  *  50,000 to 100,000 colonies/mL  Escherichia coli ESBL  *  10,000 to 50,000 colonies/mL  Escherichia coli ESBL  *  <10,000 colonies/mL  urogenital janneth    ESBL (extended beta lactamase) producing organisms require contact precautions.  >100,000 colonies/mL  Escherichia coli  *  >100,000 colonies/mL  Escherichia coli  *  10,000 to 50,000 colonies/mL Enterobacter cloacae complex  <10,000 colonies/mL urogenital janneth  Susceptibility testing not routinely done  *  No Beta Streptococcus isolated

## 2018-05-08 NOTE — PROGRESS NOTES
[facility]  Hospitalist Progress Note        Jayson Howell MD   05/08/2018        Interval History:      Feels better, no acute issues overnight, worked with therapies; hematuria improved         Assessment and Plan:      Mr Parkinson is 89-year-old male with past medical history significant for multiple myeloma with chronic anemia and thrombocytopenia, history of ESBL  UTI, diabetes, hypertension, dyslipidemia, hypothyroidism, diastolic CHF, paroxysmal atrial fibrillation not on anticoagulation and also with history of prostate cancer and bladder cancer who lives independently in assisted living facility and presented to ER with generalized weakness, hematuria and UTI symptoms, admitted on 5/7/18    Generalized weakness likely due to Urinary tract infection.  History of extended-spectrum beta-lactamase urinary tract infection.  - Continue empiric ertapenem pending urine cultures; ID following  - Status post PT/OT evaluation recommending discharge home  -  for disposition planning    Hematuria possibly secondary to above and thrombocytopenia.  Multiple myeloma with myelodysplastic syndrome.   Chemotherapy-induced pancytopenia.   History of prostate and bladder cancer.   -- Evaluated by urology and oncology ; hematuria likely due to UTI and has resolved   - Urology recommend outpatient follow-up in 4-6 weeks   - Oncology holding multiple myeloma treatment for now; plan to transfuse for platelet count less than 30 or hemoglobin less than 7  - Platelet count stable 43---44; hemoglobin stable to improved 8.9---9.3; monitor counts       Essential hypertension.   Coronary artery disease.   Heart failure with preserved ejection fraction, EF 55-60%.   Lightheadedness likely secondary to dehydration in setting of administration of oral diuretic with poor fluid intake.  -- Will continue patient's prior to admission amlodipine, lisinopril and Toprol-XL with hold parameters.   -- PRN  "hydralazine ordered.  -- Currently holding prior to admission Lasix in the setting of mild suspected dehydration; plan to resume Lasix 5/9/18     Paroxysmal atrial fibrillation, not on chronic anticoagulation.   -- The patient was discontinued off of chronic anticoagulation approximately 1 year ago due to gastrointestinal bleeding.   -- Continue prior to admission Toprol-XL with hold parameters.   -- PRN metoprolol ordered.     Diabetes mellitus type 2.  -- Recent hemoglobin A1c 5.5 on 03/22/2018.   -- Continue prior to admission prandial and basal insulin  - Blood sugar noted in low 80s in the morning but then trended up to 200s  - received lower dose of Levemir this morning.   -- continue SSI with Hypoglycemia protocol .      Hyperlipidemia.   -- Continue prior to admission gemfibrozil.      Chronic kidney disease, stage 3.   -- Creatinine on admission 1.16, baseline ranges from 0.9-1.1.   -- monitor BMP in a.m.      Hypothyroidism.   -- Recent TSH on 04/25/2018 was 10.08; the patient reported at that time that his PCP recommended for patient to take 2 tablets on Mondays and Fridays and 1 tab remaining days and to follow up with PCP in May for repeat laboratory studies.   -- Continue prior to admission levothyroxine.   -- We will continue to monitor.      Vitamin D deficiency.   -- Will continue prior to admission vitamin D.     Disposition: Likely in 1-2 day pending final urine culture sensitivities and antibiotic recommendations per infectious disease    # Pain Assessment:  Current Pain Score 5/8/2018   Patient currently in pain? denies   Pain score (0-10) -   Pain location -   Pain descriptors -   Roc s pain level was assessed and he currently denies pain.                         Physical Exam:      Blood pressure 140/62, pulse 69, temperature 95.4  F (35.2  C), temperature source Axillary, resp. rate 18, height 1.753 m (5' 9\"), weight 89.3 kg (196 lb 12.8 oz), SpO2 96 %.  Vitals:    05/07/18 0956 05/07/18 " 1207 05/08/18 0706   Weight: 90.7 kg (200 lb) 89 kg (196 lb 3.2 oz) 89.3 kg (196 lb 12.8 oz)     Vital Signs with Ranges  Temp:  [95.4  F (35.2  C)-98.2  F (36.8  C)] 95.4  F (35.2  C)  Pulse:  [69] 69  Heart Rate:  [67-77] 76  Resp:  [16-18] 18  BP: (106-169)/(50-78) 140/62  SpO2:  [94 %-99 %] 96 %  I/O's Last 24 hours  I/O last 3 completed shifts:  In: 840 [P.O.:840]  Out: 1600 [Urine:1600]    Constitutional: Alert, awake and orienetd X 3; lying comfortably in bed in no apparent distress   HEENT: Pupils equal and reactive to light and accomodation, EOMI intact; neck supple no raised JVD or rigidity    Oral cavity: Moist mucosa   Cardiovascular: Normal s1 s2, regular rate and rhythm, no murmur   Lungs: B/l clear to auscultation, no wheezes or crepitations   Abdomen: Soft, nt, nd, no guarding, rigidity or rebound; BS +   LE : No edema   Musculoskeletal: Power 5/5 in all extremities   Neuro: No focal neurological deficits noted, CN II to XII grossly intact   Psychiatry: normal mood and affect                Medications:          acyclovir (ZOVIRAX) capsule 400 mg  400 mg Oral BID     amLODIPine  10 mg Oral Daily     cholecalciferol  1,000 Units Oral Daily     ertapenem (INVanz) IV  1 g Intravenous Q24H     ferrous sulfate  325 mg Oral Daily with breakfast     [START ON 5/9/2018] furosemide  20 mg Oral Daily     gemfibrozil  600 mg Oral BID     insulin aspart  13 Units Subcutaneous QAM     insulin aspart  11 Units Subcutaneous Daily before lunch     insulin aspart  4 Units Subcutaneous At Bedtime     insulin aspart  9 Units Subcutaneous Daily with supper     insulin aspart  1-7 Units Subcutaneous TID AC     insulin aspart  1-5 Units Subcutaneous At Bedtime     [START ON 5/9/2018] insulin detemir  10 Units Subcutaneous QAM AC     levothyroxine (SYNTHROID/LEVOTHROID) tablet 25 mcg  25 mcg Oral Once per day on Sun Tue Wed Thu Sat     [START ON 5/11/2018] levothyroxine (SYNTHROID/LEVOTHROID) tablet 50 mcg  50 mcg Oral  Once per day on Mon Fri     lisinopril (PRINIVIL/ZESTRIL) tablet 15 mg  15 mg Oral Daily     metoprolol succinate  50 mg Oral Daily     sodium chloride (PF)  3 mL Intracatheter Q8H     PRN Meds: acetaminophen, bisacodyl, glucose **OR** dextrose **OR** glucagon, lidocaine 4%, lidocaine (buffered or not buffered), melatonin, naloxone, ondansetron **OR** ondansetron, prochlorperazine **OR** prochlorperazine **OR** prochlorperazine, senna-docusate **OR** senna-docusate, sodium chloride (PF)         Data:      All new lab and imaging data was reviewed.   Recent Labs   Lab Test  05/08/18   0850  05/07/18   1012  05/02/18   1017   01/20/18   0220   02/13/17   1536   WBC  4.2  5.1  6.3   < >  2.3*   < >  2.2*   HGB  9.3*  8.9*  9.0*   < >  8.1*   < >  8.2*   MCV  110*  110*  109*   < >  112*   < >  100   PLT  44*  43*  46*   < >  70*   < >  40*   INR   --   1.13   --    --   1.09   --   1.15*    < > = values in this interval not displayed.      Recent Labs   Lab Test  05/08/18   0850  05/07/18   1012  05/02/18   1017   NA  134  135  134   POTASSIUM  4.1  4.2  4.7   CHLORIDE  102  103  103   CO2  25  24  22   BUN  30  33*  25   CR  1.02  1.16  0.96   ANIONGAP  7  8  9   MICHELLE  9.5  9.3  9.5   GLC  205*  198*  142*     Recent Labs   Lab Test  03/22/18   1335  01/20/18   1443  01/20/18   1045   TROPI  <0.015  0.016  0.020

## 2018-05-08 NOTE — PROGRESS NOTES
05/08/18 0809   Quick Adds   Type of Visit Initial Occupational Therapy Evaluation   Living Environment   Lives With alone   Living Arrangements apartment;independent living facility   Home Accessibility no concerns   Transportation Available car   Self-Care   Dominant Hand right   Usual Activity Tolerance moderate   Current Activity Tolerance fair   Regular Exercise no   Equipment Currently Used at Home walker, rolling;grab bar  (Comfort ht toilet stool)   Functional Level Prior   Ambulation 1-->assistive equipment   Transferring 1-->assistive equipment   Toileting 1-->assistive equipment   Bathing 1-->assistive equipment   Dressing 0-->independent   Eating 0-->independent   Communication 0-->understands/communicates without difficulty   Swallowing 0-->swallows foods/liquids without difficulty   Cognition 0 - no cognition issues reported   Fall history within last six months no   Which of the above functional risks had a recent onset or change? none   Prior Functional Level Comment Pt reports he now receives 5meals per week from [facility] (qualifies as chemo pt)   General Information   Onset of Illness/Injury or Date of Surgery - Date 05/07/18   Referring Physician Darrell JARRETT   Patient/Family Goals Statement Pt hopes to return home   Additional Occupational Profile Info/Pertinent History of Current Problem 89 year old male with a history of CAD, HTN, hyperlipidemia, insulin dependent DM, CKD, atrial fibrillation, remote history prostate and bladder cancer, and multiple myeloma who presents to the emergency department for evaluation of hematuria. Of note, the patient has a history of multiple myeloma with a background of myelodysplastic syndrome requiring nearly 30 blood transfusions in the past year. Also recent admit in March 2018 for AFib with RVR and multiple myeloma.   Precautions/Limitations fall precautions   Cognitive Status Examination   Orientation orientation to person, place and time   Level of  Consciousness alert   Able to Follow Commands WNL/WFL   Personal Safety (Cognitive) WNL/WFL   Visual Perception   Visual Perception No deficits were identified;Wears glasses   Pain Assessment   Patient Currently in Pain No   Range of Motion (ROM)   ROM Quick Adds No deficits were identified   Strength   Manual Muscle Testing Quick Adds No deficits were identified   Coordination   Upper Extremity Coordination No deficits were identified   Mobility   Bed Mobility Comments SBA bed flat no rails   Transfer Skill: Bed to Chair/Chair to Bed   Level of [place]: Bed to Chair stand-by assist   Physical Assist/Nonphysical Assist: Bed to Chair supervision   Assistive Device - Transfer Skill Bed to Chair Chair to Bed Rehab Eval rolling walker   Transfer Skill: Sit to Stand   Level of [place]: Sit/Stand stand-by assist   Physical Assist/Nonphysical Assist: Sit/Stand supervision   Assistive Device for Transfer: Sit/Stand rolling walker   Transfer Skill: Toilet Transfer   Level of [place]: Toilet stand-by assist   Physical Assist/Nonphysical Assist: Toilet supervision   Assistive Device rolling walker;grab bars;seat riser   Balance   Balance Comments SBA during amb with walker, on turns, transfers and no LOB   Upper Body Dressing   Level of [place]: Dress Upper Body stand-by assist   Physical Assist/Nonphysical Assist: Dress Upper Body set-up required   Lower Body Dressing   Level of [place]: Dress Lower Body independent   Physical Assist/Nonphysical Assist: Dress Lower Body set-up required   Toileting   Level of [place]: Toilet stand-by assist   Physical Assist/Nonphysical Assist: Toilet supervision   Assistive Device grab bars;rolling walker   Grooming   Level of [place]: Grooming stand-by assist   Physical Assist/Nonphysical Assist: Grooming set-up required   Eating/Self Feeding   Level of [place]: Eating independent   Physical Assist/Nonphysical Assist: Eating set-up required  "  Instrumental Activities of Daily Living (IADL)   Previous Responsibilities meal prep;housekeeping;laundry;shopping;medication management;driving   Activities of Daily Living Analysis   Impairments Contributing to Impaired Activities of Daily Living strength decreased   General Therapy Interventions   Planned Therapy Interventions ADL retraining;home program guidelines   Clinical Impression   Criteria for Skilled Therapeutic Interventions Met yes, treatment indicated   OT Diagnosis decreased ADL/IADL performance   Influenced by the following impairments weakness, fatigue   Assessment of Occupational Performance 1-3 Performance Deficits   Identified Performance Deficits functional activity tolerance, IADLs   Clinical Decision Making (Complexity) Low complexity   Predicted Duration of Therapy Intervention (days/wks) eval and one treatment only   Anticipated Discharge Disposition Home   Risks and Benefits of Treatment have been explained. Yes   Patient, Family & other staff in agreement with plan of care Yes   [facility] TM \"6 Clicks\"   2016, Trustees of [facility], under license to [employer] [address].  All rights reserved.   6 Clicks Short Forms Daily Activity Inpatient Short Form   [facility]  \"6 Clicks\" Daily Activity Inpatient Short Form   1. Putting on and taking off regular lower body clothing? 4 - None   2. Bathing (including washing, rinsing, drying)? 4 - None   3. Toileting, which includes using toilet, bedpan or urinal? 4 - None   4. Putting on and taking off regular upper body clothing? 4 - None   5. Taking care of personal grooming such as brushing teeth? 4 - None   6. Eating meals? 4 - None   Daily Activity Raw Score (Score out of 24.Lower scores equate to lower levels of function) 24   Total Evaluation Time   Total Evaluation Time (Minutes) 15     "

## 2018-05-08 NOTE — PLAN OF CARE
Problem: Patient Care Overview  Goal: Plan of Care/Patient Progress Review  Outcome: No Change  A&Ox4. VSS. Denies pain. Urine bright red with small-medium sized clots. Mod carb diet, VDC=488 @0200. Meplilex to coccyx CDI. SBA with walker, steady. Will continue to monitor.

## 2018-05-09 NOTE — PLAN OF CARE
Problem: Patient Care Overview  Goal: Plan of Care/Patient Progress Review  Outcome: Improving  A&O.  VSS.  Denies pain.  Ambulating in the halls with SBA.  No hematuria noted.  Awaiting urine sensitivities.  Blood sugars stable, MD aware of hypoglycemia in the evenings, insulin decreased.  Excellent appetite.  Mepilex intact on buttock.  Blood counts stable.  PIV saline locked.  Cont abx.

## 2018-05-09 NOTE — PROGRESS NOTES
UROLOGY CHART CHECK  Urine cytology negative.     Kellee Meeks PA-C  [facility]

## 2018-05-09 NOTE — PROGRESS NOTES
[facility]  Hospitalist Progress Note        Jayson Howell MD   05/09/2018        Interval History:      Feels fine, no acute issues overnight, worked with therapies; hematuria improved; wants to go home         Assessment and Plan:      Mr Parkinson is 89-year-old male with past medical history significant for multiple myeloma with chronic anemia and thrombocytopenia, history of ESBL  UTI, diabetes, hypertension, dyslipidemia, hypothyroidism, diastolic CHF, paroxysmal atrial fibrillation not on anticoagulation and also with history of prostate cancer and bladder cancer who lives independently in assisted living facility and presented to ER with generalized weakness, hematuria and UTI symptoms, admitted on 5/7/18    Generalized weakness likely due to Urinary tract infection.  History of extended-spectrum beta-lactamase urinary tract infection.  - Continue empiric ertapenem pending urine cultures--- growing E coli, susceptibilities pending; ID following  - Status post PT/OT evaluation recommending discharge home  -  for disposition planning    Hematuria possibly secondary to above and thrombocytopenia.  Multiple myeloma with myelodysplastic syndrome.   Chemotherapy-induced pancytopenia.   History of prostate and bladder cancer.   -- Evaluated by urology and oncology ; hematuria likely due to UTI and has resolved   - Urology recommend outpatient follow-up in 4-6 weeks   - Oncology holding multiple myeloma treatment for now; plan to transfuse for platelet count less than 30 or hemoglobin less than 7  - Platelet count stable 43---44---50; hemoglobin stable to improved 8.9---9.3--8.8; monitor counts    Essential hypertension.   Coronary artery disease.   Heart failure with preserved ejection fraction, EF 55-60%.   Lightheadedness likely secondary to dehydration in setting of administration of oral diuretic with poor fluid intake.  -- Will continue patient's prior to admission  "amlodipine, lisinopril and Toprol-XL with hold parameters.   -- PRN hydralazine ordered.  -- was holding prior to admission Lasix in the setting of mild suspected dehydration; resumed Lasix 5/9/18     Paroxysmal atrial fibrillation, not on chronic anticoagulation.   -- The patient was discontinued off of chronic anticoagulation approximately 1 year ago due to gastrointestinal bleeding.   -- Continue prior to admission Toprol-XL with hold parameters.   -- PRN metoprolol ordered.     Diabetes mellitus type 2.  -- Recent hemoglobin A1c 5.5 on 03/22/2018.   -- Continue prior to admission prandial and basal insulin  - Blood sugar noted in low 50s in the evening; we decrease Levemir to 7 units and supper time NovoLog to 5 units  -- continue SSI with Hypoglycemia protocol .      Hyperlipidemia.   -- Continue prior to admission gemfibrozil.      Chronic kidney disease, stage 3.   -- Creatinine on admission 1.16, baseline ranges from 0.9-1.1.   -- monitor BMP in a.m.      Hypothyroidism.   -- Recent TSH on 04/25/2018 was 10.08; the patient reported at that time that his PCP recommended for patient to take 2 tablets on Mondays and Fridays and 1 tab remaining days and to follow up with PCP in May for repeat laboratory studies.   -- Continue prior to admission levothyroxine.   -- We will continue to monitor.      Vitamin D deficiency.   -- Will continue prior to admission vitamin D.     Disposition: Likely in 1-2 day pending final urine culture sensitivities and antibiotic recommendations per infectious disease    # Pain Assessment:  Current Pain Score 5/9/2018   Patient currently in pain? denies   Pain score (0-10) -   Pain location -   Pain descriptors -   Roc s pain level was assessed and he currently denies pain.                         Physical Exam:      Blood pressure 127/60, pulse 71, temperature 98.5  F (36.9  C), temperature source Oral, resp. rate 18, height 1.753 m (5' 9\"), weight 88.9 kg (196 lb), SpO2 93 " %.  Vitals:    05/07/18 1207 05/08/18 0706 05/09/18 0656   Weight: 89 kg (196 lb 3.2 oz) 89.3 kg (196 lb 12.8 oz) 88.9 kg (196 lb)     Vital Signs with Ranges  Temp:  [97.5  F (36.4  C)-98.5  F (36.9  C)] 98.5  F (36.9  C)  Pulse:  [71] 71  Heart Rate:  [64-69] 69  Resp:  [18] 18  BP: (121-135)/(55-62) 127/60  SpO2:  [92 %-95 %] 93 %  I/O's Last 24 hours  I/O last 3 completed shifts:  In: -   Out: 2100 [Urine:2100]    Constitutional: Alert, awake and orienetd X 3; lying comfortably in bed in no apparent distress   HEENT: Pupils equal and reactive to light and accomodation, EOMI intact; neck supple no raised JVD or rigidity    Oral cavity: Moist mucosa   Cardiovascular: Normal s1 s2, regular rate and rhythm, no murmur   Lungs: B/l clear to auscultation, no wheezes or crepitations   Abdomen: Soft, nt, nd, no guarding, rigidity or rebound; BS +   LE : No edema   Musculoskeletal: Power 5/5 in all extremities   Neuro: No focal neurological deficits noted, CN II to XII grossly intact   Psychiatry: normal mood and affect                Medications:          acyclovir (ZOVIRAX) capsule 400 mg  400 mg Oral BID     amLODIPine  10 mg Oral Daily     cholecalciferol  1,000 Units Oral Daily     ertapenem (INVanz) IV  1 g Intravenous Q24H     ferrous sulfate  325 mg Oral Daily with breakfast     furosemide  20 mg Oral Daily     gemfibrozil  600 mg Oral BID     insulin aspart  13 Units Subcutaneous QAM     insulin aspart  11 Units Subcutaneous Daily before lunch     insulin aspart  4 Units Subcutaneous At Bedtime     insulin aspart  9 Units Subcutaneous Daily with supper     insulin aspart  1-7 Units Subcutaneous TID AC     insulin aspart  1-5 Units Subcutaneous At Bedtime     insulin detemir  10 Units Subcutaneous QAM AC     levothyroxine (SYNTHROID/LEVOTHROID) tablet 25 mcg  25 mcg Oral Once per day on Sun Tue Wed Thu Sat     [START ON 5/11/2018] levothyroxine (SYNTHROID/LEVOTHROID) tablet 50 mcg  50 mcg Oral Once per day on Mon  Fri     lisinopril (PRINIVIL/ZESTRIL) tablet 15 mg  15 mg Oral Daily     metoprolol succinate  50 mg Oral Daily     sodium chloride (PF)  3 mL Intracatheter Q8H     PRN Meds: acetaminophen, bisacodyl, glucose **OR** dextrose **OR** glucagon, lidocaine 4%, lidocaine (buffered or not buffered), melatonin, naloxone, ondansetron **OR** ondansetron, prochlorperazine **OR** prochlorperazine **OR** prochlorperazine, senna-docusate **OR** senna-docusate, sodium chloride (PF)         Data:      All new lab and imaging data was reviewed.   Recent Labs   Lab Test  05/09/18   0645  05/08/18   0850  05/07/18   1012   01/20/18   0220   02/13/17   1536   WBC  3.6*  4.2  5.1   < >  2.3*   < >  2.2*   HGB  8.8*  9.3*  8.9*   < >  8.1*   < >  8.2*   MCV  109*  110*  110*   < >  112*   < >  100   PLT  50*  44*  43*   < >  70*   < >  40*   INR   --    --   1.13   --   1.09   --   1.15*    < > = values in this interval not displayed.      Recent Labs   Lab Test  05/09/18   0645  05/08/18   0850  05/07/18   1012   NA  137  134  135   POTASSIUM  4.3  4.1  4.2   CHLORIDE  105  102  103   CO2  23  25  24   BUN  27  30  33*   CR  0.97  1.02  1.16   ANIONGAP  9  7  8   MICHELLE  9.1  9.5  9.3   GLC  106*  205*  198*     Recent Labs   Lab Test  03/22/18   1335  01/20/18   1443  01/20/18   1045   TROPI  <0.015  0.016  0.020

## 2018-05-09 NOTE — PLAN OF CARE
Problem: Patient Care Overview  Goal: Plan of Care/Patient Progress Review  Outcome: Improving  A&O.  VSS.  Denies pain.  Pt on contact isolation for ESBL. Poor appetite.   BG at 2130: 58, oral glucose refused.  Apple juice and PB toast administered.  BG to 103.   No hematuria noted this shift.  Mepilex intact on buttock.  Up with SBA and walker.  R PIV saline locked, WDL.  Plan of care:  Continue to monitor.

## 2018-05-09 NOTE — PLAN OF CARE
Problem: Patient Care Overview  Goal: Plan of Care/Patient Progress Review  Outcome: Improving  A&Ox4. VSS. Denies pain. Urine clear yellow, good output. Mod carb diet, PAC=101 @0200. Meplilex to coccyx CDI. SBA with walker, steady. Will continue to monitor.

## 2018-05-09 NOTE — PROGRESS NOTES
Care Transition Initial Assessment - SW  Reason For Consult: discharge planning  Met with: chart review   Active Problems:    Hematuria       DATA  Lives With: alone  Living Arrangements: apartment, independent living facility  Description of Support System: Supportive, Involved  Who is your support system?: Children  Support Assessment: Adequate family and caregiver support.  Identified issues/concerns regarding health management: SW following for d/c needs  Quality Of Family Relationships: supportive, involved  Transportation Available: car    ASSESSMENT  Cognitive Status:  Alert and oriented X4  Concerns to be addressed:Patient is a 89 year old male who was admitted to the hospital for hematuria. Prior to hospitalization patient was living at [facility] where he was managing well. Patient was assessed by OT who feels patient is safe to return to [facility] with resumption of HEP and increase in walking program. SW will continue to follow and assist in the case any additional d/c needs arise.      PLAN  Financial costs for the patient includes   Patient given options and choices for discharge   Patient/family is agreeable to the plan?  YES  Patient Goals and Preferences: return to ILF with resumption of HEP and increase in walking program  Patient anticipates discharging to:  [facility]    JAYDA Moody   *[phone]

## 2018-05-09 NOTE — PROGRESS NOTES
[facility]    Infectious Disease Progress Note    Date of Service (when I saw the patient): 05/09/2018     Assessment & Plan   Roc Parkinson is a 89 year old male who was admitted on 5/7/2018.   Impression:  1. 89 y.o with MM  2. History of bladder and prostrate cancer.   3. Admitted with dysuria.   4. H/o of ESBL infection.   5. UA dirty, UC pending.   6. Started on Ertapenem.      Recommendations:   2 strains of E coli in the culture pending SAMI.   Continue Ertapenem, will follow up on the cultures        Lor Cooper MD    Interval History   Remains afebrile   Reports of blood in urine, clots   UC pending     Physical Exam   Temp: 98.5  F (36.9  C) Temp src: Oral BP: 127/60 Pulse: 71 Heart Rate: 69 Resp: 18 SpO2: 93 % O2 Device: None (Room air)    Vitals:    05/07/18 1207 05/08/18 0706 05/09/18 0656   Weight: 89 kg (196 lb 3.2 oz) 89.3 kg (196 lb 12.8 oz) 88.9 kg (196 lb)     Vital Signs with Ranges  Temp:  [97.5  F (36.4  C)-98.5  F (36.9  C)] 98.5  F (36.9  C)  Pulse:  [71] 71  Heart Rate:  [64-69] 69  Resp:  [18] 18  BP: (121-135)/(55-62) 127/60  SpO2:  [93 %-95 %] 93 %    Constitutional: Awake, alert, cooperative, no apparent distress  Lungs: Clear to auscultation bilaterally, no crackles or wheezing  Cardiovascular: Regular rate and rhythm, normal S1 and S2, and no murmur noted  Abdomen: Normal bowel sounds, soft, non-distended, non-tender  Skin: No rashes, no cyanosis, no edema  Other:    Medications       acyclovir (ZOVIRAX) capsule 400 mg  400 mg Oral BID     amLODIPine  10 mg Oral Daily     cholecalciferol  1,000 Units Oral Daily     ertapenem (INVanz) IV  1 g Intravenous Q24H     ferrous sulfate  325 mg Oral Daily with breakfast     furosemide  20 mg Oral Daily     gemfibrozil  600 mg Oral BID     insulin aspart  13 Units Subcutaneous QAM     insulin aspart  11 Units Subcutaneous Daily before lunch     insulin aspart  4 Units Subcutaneous At Bedtime     insulin aspart  1-7 Units  Subcutaneous TID AC     insulin aspart  1-5 Units Subcutaneous At Bedtime     insulin aspart  5 Units Subcutaneous Daily with supper     [START ON 5/10/2018] insulin detemir  7 Units Subcutaneous QAM AC     levothyroxine (SYNTHROID/LEVOTHROID) tablet 25 mcg  25 mcg Oral Once per day on Sun Tue Wed Thu Sat     [START ON 5/11/2018] levothyroxine (SYNTHROID/LEVOTHROID) tablet 50 mcg  50 mcg Oral Once per day on Mon Fri     lisinopril (PRINIVIL/ZESTRIL) tablet 15 mg  15 mg Oral Daily     metoprolol succinate  50 mg Oral Daily     sodium chloride (PF)  3 mL Intracatheter Q8H       Data   All microbiology laboratory data reviewed.  Recent Labs   Lab Test  05/09/18   0645  05/08/18   0850  05/07/18   1012   WBC  3.6*  4.2  5.1   HGB  8.8*  9.3*  8.9*   HCT  26.9*  28.6*  27.3*   MCV  109*  110*  110*   PLT  50*  44*  43*     Recent Labs   Lab Test  05/09/18   0645  05/08/18   0850  05/07/18   1012   CR  0.97  1.02  1.16     Recent Labs   Lab Test  11/17/16   0938   SED  126*     Recent Labs   Lab Test  05/07/18   0956  01/20/18   0439  01/20/18   0424  01/20/18   0314  12/13/17   1049  12/01/17   1600  10/24/17   0929  10/23/17   1219  02/13/17   1530   CULT  >100,000 colonies/mL  Escherichia coli  *  >100,000 colonies/mL  Strain 2  Escherichia coli  *  Susceptibility testing in progress  No growth  No growth  >100,000 colonies/mL  Escherichia coli ESBL  *  >100,000 colonies/mL  Strain 2  Escherichia coli ESBL  *  Enterobacteriaceae that are susceptible to meropenem are usually susceptible to ertapenem.  ESBL (extended beta lactamase) producing organisms require contact precautions.  >100,000 colonies/mL  Escherichia coli ESBL  ESBL (extended beta lactamase) producing organisms require contact precautions.  *  50,000 to 100,000 colonies/mL  Escherichia coli ESBL  *  10,000 to 50,000 colonies/mL  Escherichia coli ESBL  *  <10,000 colonies/mL  urogenital janneth    ESBL (extended beta lactamase) producing organisms  require contact precautions.  >100,000 colonies/mL  Escherichia coli  *  >100,000 colonies/mL  Escherichia coli  *  10,000 to 50,000 colonies/mL Enterobacter cloacae complex  <10,000 colonies/mL urogenital janneth Susceptibility testing not routinely done  *

## 2018-05-10 PROBLEM — N30.01 ACUTE HEMORRHAGIC CYSTITIS: Status: ACTIVE | Noted: 2018-01-01

## 2018-05-10 NOTE — DISCHARGE SUMMARY
[facility]  Discharge Summary        Roc Parkinson MRN# [id number]   YOB: 1926 Age: 89 year old     Date of Admission:  5/7/2018  Date of Discharge:  5/10/2018  Admitting Physician:  Jayson Howell MD  Discharge Physician: Jayson Howell MD  Discharging Service: Hospitalist     Primary Provider: Dickson Reich  Primary Care Physician Phone Number: [phone]         Discharge Diagnoses/Problem Oriented Hospital Course (Providers):    Roc Parkinson was admitted on 5/7/2018 by Jayson Howell MD and I would refer you to their history and physical.  The following problems were addressed during his hospitalization:    Mr Parkinson is 89-year-old male with past medical history significant for multiple myeloma with chronic anemia and thrombocytopenia, history of ESBL  UTI, diabetes, hypertension, dyslipidemia, hypothyroidism, diastolic CHF, paroxysmal atrial fibrillation not on anticoagulation and also with history of prostate cancer and bladder cancer who lives independently in assisted living facility and presented to ER with generalized weakness, hematuria and UTI symptoms, admitted on 5/7/18     Generalized weakness likely due to Urinary tract infection.  History of extended-spectrum beta-lactamase urinary tract infection.  - Clinically much improved, remained afebrile and no leukocytosis  - was started on empiric ertapenem; urine cultures growing ESBL E coli, ID recommending Ertapenem for 10 more days through midline  - Status post PT/OT evaluation recommending discharge home  -  for disposition planning---- plans for daily IV antibiotics at the infusion center     Hematuria possibly secondary to above and thrombocytopenia.  Multiple myeloma with myelodysplastic syndrome.   Chemotherapy-induced pancytopenia.   History of prostate and bladder cancer.   -- Evaluated by urology and oncology ; hematuria likely due to UTI and has resolved   -  urine cytology was checked by urology and is Negative for High-Grade Urothelial Carcinoma  - Oncology holding multiple myeloma treatment for now; plan to transfuse for platelet count less than 30 or hemoglobin less than 7  - Platelet count stable 43---44---50; hemoglobin stable to improved 8.9---9.3--8.8; monitor counts     Essential hypertension.   Coronary artery disease.   Heart failure with preserved ejection fraction, EF 55-60%.   Lightheadedness likely secondary to dehydration in setting of administration of oral diuretic with poor fluid intake.  -- Will continue patient's prior to admission amlodipine, lisinopril and Toprol-XL with hold parameters.   -- PRN hydralazine ordered.  -- was holding prior to admission Lasix in the setting of mild suspected dehydration; resumed Lasix 5/9/18      Paroxysmal atrial fibrillation, not on chronic anticoagulation.   -- The patient was discontinued off of chronic anticoagulation approximately 1 year ago due to gastrointestinal bleeding.   -- Continue prior to admission Toprol-XL with hold parameters.   -- PRN metoprolol ordered.      Diabetes mellitus type 2.  -- Recent hemoglobin A1c 5.5 on 03/22/2018.   -- Continue prior to admission prandial and basal insulin with some adjustment for hypoglycemia  - Blood sugar better controlled after we decreased Levemir to 7 units and supper time NovoLog to 5 units; no further episode of hypoglycemia noted    Hyperlipidemia.   -- Continue prior to admission gemfibrozil.       Chronic kidney disease, stage 3.   -- Creatinine on admission 1.16---1.02---0.97, baseline ranges from 0.9-1.1.       Hypothyroidism.   -- Recent TSH on 04/25/2018 was 10.08; the patient reported at that time that his PCP recommended for patient to take 2 tablets on Mondays and Fridays and 1 tab remaining days and to follow up with PCP in May for repeat laboratory studies.   -- Continue prior to admission levothyroxine.   -- We will continue to monitor.        Vitamin D deficiency.   -- Will continue prior to admission vitamin D.     # Discharge Pain Plan:  - Patient currently has NO PAIN and is not being prescribed pain medications on discharge.    Discussed discharge plan with family over the phone who was concerned if the urine could just be colonized with ESBL.  Since the patient had symptomatic improvement on antibiotics, would complete the course of antibiotics.  Suggested for a repeat urine culture in 2 weeks to document resolution.  If the repeat urine culture still grows ESBL and if the patient is asymptomatic, this could probably be colonization and we might not need to treat unless the patient is symptomatic.            Brief Hospital Stay Summary Sent Home With Patient in AVS:               Pending Results:        Unresulted Labs Ordered in the Past 30 Days of this Admission     Date and Time Order Name Status Description    5/7/2018 1049 Urine Culture Aerobic Bacterial Preliminary             Discharge Instructions and Follow-Up:      Follow-up Appointments     Follow-up and recommended labs and tests        Follow up with primary care provider, Dickson Reich, within 7   days for hospital follow- up with repeat CBC and BMP.  Follow-up with oncology as suggested.                        Discharge Disposition:      Discharged to home        Discharge Medications:        Current Discharge Medication List      START taking these medications    Details   ertapenem (INVANZ) 1 GM injection Inject 1 g into the vein every 24 hours for 10 days CBC with differential, creatinine, SGOT weekly while on this medication to be faxed to Dr. Shaffer office.  Qty: 100 mL, Refills: 0    Associated Diagnoses: Acute hemorrhagic cystitis         CONTINUE these medications which have CHANGED    Details   !! insulin aspart (NOVOLOG) 100 UNITS/ML injection Inject 5 Units Subcutaneous daily (with dinner)    Associated Diagnoses: Type 2 diabetes mellitus with stage 3 chronic  kidney disease, with long-term current use of insulin (H)      insulin detemir (LEVEMIR FLEXPEN/FLEXTOUCH) 100 UNIT/ML injection Inject 7 Units Subcutaneous every morning (before breakfast)  Qty: 15 mL, Refills: 1    Associated Diagnoses: Type 2 diabetes mellitus with diabetic chronic kidney disease, unspecified CKD stage, unspecified long term insulin use status (H); Uncontrolled type 2 diabetes mellitus with hyperglycemia, unspecified long term insulin use status (H); Type 2 diabetes mellitus with stage 3 chronic kidney disease, with long-term current use of insulin (H)       !! - Potential duplicate medications found. Please discuss with provider.      CONTINUE these medications which have NOT CHANGED    Details   ACYCLOVIR PO Take 400 mg by mouth 2 times daily Oncology to decide when stop date will be      amLODIPine (NORVASC) 10 MG tablet Take 1 tablet (10 mg) by mouth daily  Qty: 30 tablet    Associated Diagnoses: Essential hypertension      Ascorbic Acid (VITAMIN C PO) Take 500 mg by mouth daily       dexamethasone (DECADRON) 4 MG tablet Take 10 tablets (40 mg) by mouth on Days 1, 8, and 15.  Qty: 30 tablet, Refills: 0    Associated Diagnoses: Multiple myeloma not having achieved remission (H)      ferrous sulfate (IRON) 325 (65 FE) MG tablet Take 325 mg by mouth daily (with breakfast)      furosemide (LASIX) 20 MG tablet TAKE 1 TABLET BY MOUTH EVERY DAY  Qty: 30 tablet, Refills: 0    Associated Diagnoses: Multiple myeloma not having achieved remission (H)      gemfibrozil (LOPID) 600 MG tablet TAKE 1 TABLET BY MOUTH TWICE DAILY  Qty: 180 tablet, Refills: 1    Associated Diagnoses: Hyperlipidemia      !! INSULIN ASPART SC Inject 13 Units Subcutaneous every morning       !! INSULIN ASPART SC Inject 11 Units Subcutaneous daily (before lunch)       !! INSULIN ASPART SC Inject Subcutaneous At Bedtime Inject 4 unit subcutaneously at bedtime for Diabetes II Ok to use Humalog insulin with same order details      !!  INSULIN ASPART SC Inject Subcutaneous 4 times daily Sliding scale:  140-175 1 unit  176-210 2 units  211-245 3 units  246-280 4 units  281-315 5 units  316-350 6 units  351-385 7 units  386-420 8 units  >420 9 units      !! Levothyroxine Sodium (SYNTHROID PO) Take 50 mcg by mouth twice a week Monday and Friday      !! LEVOTHYROXINE SODIUM PO Take 25 mcg by mouth five times a week Tuesday, Wednesday, Thursday, Saturday, Sunday      LISINOPRIL PO Take 15 mg by mouth daily      LORazepam (ATIVAN) 0.5 MG tablet Take 1 tablet (0.5 mg) by mouth every 4 hours as needed (Anxiety, Nausea/Vomiting or Sleep)  Qty: 30 tablet, Refills: 3    Associated Diagnoses: Multiple myeloma not having achieved remission (H)      metoprolol succinate (TOPROL-XL) 50 MG 24 hr tablet Take 1 tablet (50 mg) by mouth daily  Qty: 30 tablet    Associated Diagnoses: Atrial fibrillation with rapid ventricular response (H)      polyethylene glycol (MIRALAX/GLYCOLAX) Packet Take 17 g by mouth 2 times daily as needed (constipation)  Qty: 7 packet    Associated Diagnoses: Slow transit constipation      prochlorperazine (COMPAZINE) 10 MG tablet Take 1 tablet (10 mg) by mouth every 6 hours as needed (Nausea/Vomiting)  Qty: 30 tablet, Refills: 3    Associated Diagnoses: Multiple myeloma not having achieved remission (H)      Selenium 200 MCG TABS Take 100 mcg by mouth daily. Pt takes one half tab of the 200 mcg daily       VITAMIN D, CHOLECALCIFEROL, PO Take 1,000 Units by mouth daily       B-D U/F 31G X 8 MM insulin pen needle USE 5 PEN NEEDLES PER DAY OR AS DIRECTED  Qty: 500 each, Refills: 1    Associated Diagnoses: Type 2 diabetes mellitus with stage 3 chronic kidney disease, with long-term current use of insulin (H)      Blood Glucose Monitoring Suppl MISC 3 times daily (before meals)       FREESTYLE LITE test strip USE TO TEST FOUR TIMES DAILY  Qty: 400 strip, Refills: 0    Associated Diagnoses: Uncontrolled type 1 diabetes mellitus with stage 3  "chronic kidney disease (H)       !! - Potential duplicate medications found. Please discuss with provider.            Allergies:       No Known Allergies        Consultations This Hospital Stay:      Urology, oncology, infectious diseases        Condition and Physical on Discharge:      Discharge condition: Stable   Vitals: Blood pressure 139/60, pulse 71, temperature 97.5  F (36.4  C), temperature source Oral, resp. rate 16, height 1.753 m (5' 9\"), weight 88.8 kg (195 lb 12.8 oz), SpO2 97 %.     Constitutional: Alert, awake and orienetd X 3; lying comfortably in bed in no apparent distress   HEENT: Pupils equal and reactive to light and accomodation, EOMI intact; neck supple no raised JVD or rigidity    Oral cavity: Moist mucosa   Cardiovascular: Normal s1 s2, regular rate and rhythm, no murmur   Lungs: B/l clear to auscultation, no wheezes or crepitations   Abdomen: Soft, nt, nd, no guarding, rigidity or rebound; BS +   LE : No edema   Musculoskeletal: Power 5/5 in all extremities   Neuro: No focal neurological deficits noted, CN II to XII grossly intact   Psychiatry: normal mood and affect             Discharge Time:      Greater than 30 minutes.        Image Results From This Hospital Stay (For Non-EPIC Providers):        Results for orders placed or performed during the hospital encounter of 03/22/18   XR Chest 2 Views    Narrative    CHEST TWO VIEWS   3/25/2018 12:21 PM     HISTORY: Acute respiratory failure with right basal crackles, rule out  aspiration pneumonia.     COMPARISON: 1/20/2018.      Impression    IMPRESSION: New right basilar consolidation with right pleural  effusion concerning for pneumonia. There is also small left pleural  effusion and mild left basilar opacity. No pneumothorax visualized.  Cardiac silhouette is unchanged. There are atherosclerotic  calcifications of the aortic arch.    ANGELIQUE CANCINO MD           Most Recent Lab Results In EPIC (For Non-EPIC Providers):    Most Recent 3 " CBC's:  Recent Labs   Lab Test  05/09/18   0645  05/08/18   0850  05/07/18   1012   WBC  3.6*  4.2  5.1   HGB  8.8*  9.3*  8.9*   MCV  109*  110*  110*   PLT  50*  44*  43*      Most Recent 3 BMP's:  Recent Labs   Lab Test  05/09/18   0645  05/08/18   0850  05/07/18   1012   NA  137  134  135   POTASSIUM  4.3  4.1  4.2   CHLORIDE  105  102  103   CO2  23  25  24   BUN  27  30  33*   CR  0.97  1.02  1.16   ANIONGAP  9  7  8   MICHELLE  9.1  9.5  9.3   GLC  106*  205*  198*     Most Recent 3 Troponin's:  Recent Labs   Lab Test  03/22/18   1335  01/20/18   1443  01/20/18   1045   TROPI  <0.015  0.016  0.020     Most Recent 3 INR's:  Recent Labs   Lab Test  05/07/18   1012  01/20/18   0220  02/13/17   1536   INR  1.13  1.09  1.15*     Most Recent 2 LFT's:  Recent Labs   Lab Test  05/02/18   1017  04/11/18   0806   AST  12  13   ALT  13  11   ALKPHOS  85  80   BILITOTAL  0.4  0.5     Most Recent Cholesterol Panel:  Recent Labs   Lab Test  05/23/17   1014   CHOL  123   LDL  64   HDL  35*   TRIG  120     Most Recent 6 Bacteria Isolates From Any Culture (See EPIC Reports for Culture Details):  Recent Labs   Lab Test  05/07/18   0956  01/20/18   0439  01/20/18   0424  01/20/18   0314  12/13/17   1049  12/01/17   1600   CULT  >100,000 colonies/mL  Escherichia coli ESBL  Additional susceptibilities in progress  5/10/18  *  >100,000 colonies/mL  Strain 2  Escherichia coli ESBL  *  ESBL (extended beta lactamase) producing organisms require contact precautions.  Enterobacteriaceae that are susceptible to meropenem are usually susceptible to ertapenem.  No growth  No growth  >100,000 colonies/mL  Escherichia coli ESBL  *  >100,000 colonies/mL  Strain 2  Escherichia coli ESBL  *  Enterobacteriaceae that are susceptible to meropenem are usually susceptible to ertapenem.  ESBL (extended beta lactamase) producing organisms require contact precautions.  >100,000 colonies/mL  Escherichia coli ESBL  ESBL (extended beta lactamase)  producing organisms require contact precautions.  *  50,000 to 100,000 colonies/mL  Escherichia coli ESBL  *  10,000 to 50,000 colonies/mL  Escherichia coli ESBL  *  <10,000 colonies/mL  urogenital janneth    ESBL (extended beta lactamase) producing organisms require contact precautions.     Most Recent TSH, T4 and HgbA1c:   Recent Labs   Lab Test  04/25/18   0808   03/22/18   1335   TSH  10.08*   < >   --    T4  0.81   --    --    A1C   --    --   5.5    < > = values in this interval not displayed.

## 2018-05-10 NOTE — PLAN OF CARE
Problem: Patient Care Overview  Goal: Plan of Care/Patient Progress Review  Outcome: No Change  Patient is A&Ox4. VSS. Denies pain overnight. Tolerating Mod CHO diet. Has blanchable redness to left coccyx, Mepilex in place, CDI. R PIV SL. Up SBA with walker to bathroom. Maintaining contact precautions.

## 2018-05-10 NOTE — PROGRESS NOTES
[facility]  Hospitalist Progress Note        Jayson Howell MD   05/10/2018        Interval History:      Feels fine, no acute issues overnight, no more episodes of hematuria         Assessment and Plan:      Mr Parkinson is 89-year-old male with past medical history significant for multiple myeloma with chronic anemia and thrombocytopenia, history of ESBL  UTI, diabetes, hypertension, dyslipidemia, hypothyroidism, diastolic CHF, paroxysmal atrial fibrillation not on anticoagulation and also with history of prostate cancer and bladder cancer who lives independently in assisted living facility and presented to ER with generalized weakness, hematuria and UTI symptoms, admitted on 5/7/18    Generalized weakness likely due to Urinary tract infection.  History of extended-spectrum beta-lactamase urinary tract infection.  - Clinically much improved, has remained afebrile and no leukocytosis  - Continue empiric ertapenem pending urine cultures--- growing ESBL E coli, ID recommending Ertapenem for 10 more days through midline  - Status post PT/OT evaluation recommending discharge home  -  for disposition planning---- plans for daily IV antibiotics at the infusion center    Hematuria possibly secondary to above and thrombocytopenia.  Multiple myeloma with myelodysplastic syndrome.   Chemotherapy-induced pancytopenia.   History of prostate and bladder cancer.   -- Evaluated by urology and oncology ; hematuria likely due to UTI and has resolved   - Urology recommend outpatient follow-up in 4-6 weeks, urine cytology was checked by urology and is Negative for High-Grade Urothelial Carcinoma  - Oncology holding multiple myeloma treatment for now; plan to transfuse for platelet count less than 30 or hemoglobin less than 7  - Platelet count stable 43---44---50; hemoglobin stable to improved 8.9---9.3--8.8; monitor counts    Essential hypertension.   Coronary artery disease.   Heart failure with  preserved ejection fraction, EF 55-60%.   Lightheadedness likely secondary to dehydration in setting of administration of oral diuretic with poor fluid intake.  -- Will continue patient's prior to admission amlodipine, lisinopril and Toprol-XL with hold parameters.   -- PRN hydralazine ordered.  -- was holding prior to admission Lasix in the setting of mild suspected dehydration; resumed Lasix 5/9/18     Paroxysmal atrial fibrillation, not on chronic anticoagulation.   -- The patient was discontinued off of chronic anticoagulation approximately 1 year ago due to gastrointestinal bleeding.   -- Continue prior to admission Toprol-XL with hold parameters.   -- PRN metoprolol ordered.     Diabetes mellitus type 2.  -- Recent hemoglobin A1c 5.5 on 03/22/2018.   -- Continue prior to admission prandial and basal insulin  - Blood sugar better controlled after we decreased Levemir to 7 units and supper time NovoLog to 5 units; no further episode of hypoglycemia noted  -- continue SSI with Hypoglycemia protocol .      Hyperlipidemia.   -- Continue prior to admission gemfibrozil.      Chronic kidney disease, stage 3.   -- Creatinine on admission 1.16---1.02---0.97, baseline ranges from 0.9-1.1.   -- monitor BMP      Hypothyroidism.   -- Recent TSH on 04/25/2018 was 10.08; the patient reported at that time that his PCP recommended for patient to take 2 tablets on Mondays and Fridays and 1 tab remaining days and to follow up with PCP in May for repeat laboratory studies.   -- Continue prior to admission levothyroxine.   -- We will continue to monitor.      Vitamin D deficiency.   -- Will continue prior to admission vitamin D.     Disposition: Likely in 1-2 day pending final urine culture sensitivities and antibiotic recommendations per infectious disease    # Pain Assessment:  Current Pain Score 5/10/2018   Patient currently in pain? denies   Pain score (0-10) -   Pain location -   Pain descriptors -   Roc persaud pain level was  "assessed and he currently denies pain.                         Physical Exam:      Blood pressure 131/64, pulse 71, temperature 98  F (36.7  C), temperature source Oral, resp. rate 16, height 1.753 m (5' 9\"), weight 88.8 kg (195 lb 12.8 oz), SpO2 95 %.  Vitals:    05/08/18 0706 05/09/18 0656 05/10/18 0356   Weight: 89.3 kg (196 lb 12.8 oz) 88.9 kg (196 lb) 88.8 kg (195 lb 12.8 oz)     Vital Signs with Ranges  Temp:  [97.7  F (36.5  C)-98.4  F (36.9  C)] 98  F (36.7  C)  Heart Rate:  [65-79] 65  Resp:  [16] 16  BP: (131-149)/(62-69) 131/64  SpO2:  [94 %-97 %] 95 %  I/O's Last 24 hours  I/O last 3 completed shifts:  In: 720 [P.O.:720]  Out: 2675 [Urine:2675]    Constitutional: Alert, awake and orienetd X 3; lying comfortably in bed in no apparent distress   HEENT: Pupils equal and reactive to light and accomodation, EOMI intact; neck supple no raised JVD or rigidity    Oral cavity: Moist mucosa   Cardiovascular: Normal s1 s2, regular rate and rhythm, no murmur   Lungs: B/l clear to auscultation, no wheezes or crepitations   Abdomen: Soft, nt, nd, no guarding, rigidity or rebound; BS +   LE : No edema   Musculoskeletal: Power 5/5 in all extremities   Neuro: No focal neurological deficits noted, CN II to XII grossly intact   Psychiatry: normal mood and affect                Medications:          acyclovir (ZOVIRAX) capsule 400 mg  400 mg Oral BID     amLODIPine  10 mg Oral Daily     cholecalciferol  1,000 Units Oral Daily     ertapenem (INVanz) IV  1 g Intravenous Q24H     ferrous sulfate  325 mg Oral Daily with breakfast     furosemide  20 mg Oral Daily     gemfibrozil  600 mg Oral BID     insulin aspart  13 Units Subcutaneous QAM     insulin aspart  11 Units Subcutaneous Daily before lunch     insulin aspart  4 Units Subcutaneous At Bedtime     insulin aspart  1-7 Units Subcutaneous TID AC     insulin aspart  1-5 Units Subcutaneous At Bedtime     insulin aspart  5 Units Subcutaneous Daily with supper     insulin " detemir  7 Units Subcutaneous QAM AC     levothyroxine (SYNTHROID/LEVOTHROID) tablet 25 mcg  25 mcg Oral Once per day on Sun Tue Wed Thu Sat     [START ON 5/11/2018] levothyroxine (SYNTHROID/LEVOTHROID) tablet 50 mcg  50 mcg Oral Once per day on Mon Fri     lisinopril (PRINIVIL/ZESTRIL) tablet 15 mg  15 mg Oral Daily     metoprolol succinate  50 mg Oral Daily     sodium chloride (PF)  3 mL Intracatheter Q8H     PRN Meds: acetaminophen, bisacodyl, glucose **OR** dextrose **OR** glucagon, lidocaine 4%, lidocaine (buffered or not buffered), melatonin, naloxone, ondansetron **OR** ondansetron, prochlorperazine **OR** prochlorperazine **OR** prochlorperazine, senna-docusate **OR** senna-docusate, sodium chloride (PF)         Data:      All new lab and imaging data was reviewed.   Recent Labs   Lab Test  05/09/18   0645  05/08/18   0850  05/07/18   1012   01/20/18   0220   02/13/17   1536   WBC  3.6*  4.2  5.1   < >  2.3*   < >  2.2*   HGB  8.8*  9.3*  8.9*   < >  8.1*   < >  8.2*   MCV  109*  110*  110*   < >  112*   < >  100   PLT  50*  44*  43*   < >  70*   < >  40*   INR   --    --   1.13   --   1.09   --   1.15*    < > = values in this interval not displayed.      Recent Labs   Lab Test  05/09/18   0645  05/08/18   0850  05/07/18   1012   NA  137  134  135   POTASSIUM  4.3  4.1  4.2   CHLORIDE  105  102  103   CO2  23  25  24   BUN  27  30  33*   CR  0.97  1.02  1.16   ANIONGAP  9  7  8   MICHELLE  9.1  9.5  9.3   GLC  106*  205*  198*     Recent Labs   Lab Test  03/22/18   1335  01/20/18   1443  01/20/18   1045   TROPI  <0.015  0.016  0.020

## 2018-05-10 NOTE — PROGRESS NOTES
Care Transition Initial Assessment - RN    Reason For Consult: discharge planning   Met with: Patient & Md to discuss discharge plans.  Per Md, pt will need 10 days of IV abx.  Discussed option of outpt IV abx at [facility].  Pt was in agreement with this plan.  Called [facility] and scheduled pt for outpt IV abx starting on 5/11/18.  Called & spoke with pt's Oncologist Dr. Quinn's nurse Jeanie to schedule pt with f/u appt.  Per Jeanie, pt will be scheduled with a Provider next week during a time when pt is coming in for his IV abx.  Pt has PCP follow-up on 5/22/18-asked pt if appointment can be made for an earlier date, pt declined as he is going to be following up with his Oncologist next week.     DATA   Active Problems:    Hematuria      Cognitive Status: awake.  Primary Care Clinic Name: [facility]   Primary Care MD Name: Dr. Reich  Contact information and PCP information verified: Yes  Lives With: alone  Living Arrangements: apartment, independent living facility  Quality Of Family Relationships: supportive, involved  Description of Support System: Supportive, Involved   Who is your support system?: Children   Support Assessment: Adequate family and caregiver support   Insurance concerns: No Insurance issues identified  ASSESSMENT  Patient currently receives the following services:  none        Identified issues/concerns regarding health management: pt having to discharge on IV abx    PLAN  Financial costs for the patient include N/A .  Patient given options and choices for discharge yes .  Patient/family is agreeable to the plan?  Yes: discharge home with [facility]pt [facility] abx [facility]  Patient anticipates discharging to home .        Patient anticipates needs for home equipment: No  Plan/Disposition: Home   Appointments: See AVS      Care  (CTS) will continue to follow as needed.        \

## 2018-05-10 NOTE — PLAN OF CARE
Problem: Patient Care Overview  Goal: Plan of Care/Patient Progress Review  Outcome: Adequate for Discharge Date Met: 05/10/18  Pt is A and O X 4. Calls appropriately. VSS. Denies pain this shift. . Pt refused scheduled insulin with breakfast. Pt educated about need for it,   but continued to refuse. Insulin was accepted at noon.   Pleasant and cooperative. Lungs diminished. Blanchable redness on coccyx  Mepilex in place. SBA and walker to bathroom and in room. Regular diet: good appetite. Plans for d/c today: awaiting  midline insertion for IV therapy at home. To go home with IV antibiotic infusion.

## 2018-05-10 NOTE — PLAN OF CARE
Problem: Patient Care Overview  Goal: Plan of Care/Patient Progress Review  Outcome: Improving  A&O.  VSS.  Pt on contact isolation for ESBL.  Denies pain.  Lung sounds diminished.   Ambulating in the halls with SBA.  No hematuria noted.  Mepilex on coccyx is c/d/i.  Mod carb diet, low sat fat diet, tolerating well.   Urine is clear, yellow, no clots noted. Bowel sounds hypoactive.  Last BM 5/8. R PIV SL, WDL.  , insulin held.  Evening .  No insulin administered.  Pt up with SBA and walker.  Plan of care: continue to monitor urine output, blood sugar, and s/s of infection.

## 2018-05-10 NOTE — PROGRESS NOTES
[facility]    Infectious Disease Progress Note    Date of Service (when I saw the patient): 05/10/2018     Assessment & Plan   Roc Parkinson is a 89 year old male who was admitted on 5/7/2018.   Impression:  1. 89 y.o with MM  2. History of bladder and prostrate cancer.   3. Admitted with dysuria.   4. H/o of ESBL infection.   5. UA dirty, UC pending.   6. Started on Ertapenem.      Recommendations:   2 strains of E coli in the culture one of them is ESBL>   Needs midline 10  More days of ertapenem, discussed with Dr. Howell. Orders are in.         Lor Cooper MD    Interval History   Remains afebrile       Physical Exam   Temp: 97.5  F (36.4  C) Temp src: Oral BP: 139/60   Heart Rate: 69 Resp: 16 SpO2: 97 % O2 Device: None (Room air)    Vitals:    05/08/18 0706 05/09/18 0656 05/10/18 0356   Weight: 89.3 kg (196 lb 12.8 oz) 88.9 kg (196 lb) 88.8 kg (195 lb 12.8 oz)     Vital Signs with Ranges  Temp:  [97.5  F (36.4  C)-98.4  F (36.9  C)] 97.5  F (36.4  C)  Heart Rate:  [65-79] 69  Resp:  [16] 16  BP: (109-149)/(50-69) 139/60  SpO2:  [91 %-97 %] 97 %    Constitutional: Awake, alert, cooperative, no apparent distress  Lungs: Clear to auscultation bilaterally, no crackles or wheezing  Cardiovascular: Regular rate and rhythm, normal S1 and S2, and no murmur noted  Abdomen: Normal bowel sounds, soft, non-distended, non-tender  Skin: No rashes, no cyanosis, no edema  Other:    Medications       acyclovir (ZOVIRAX) capsule 400 mg  400 mg Oral BID     amLODIPine  10 mg Oral Daily     cholecalciferol  1,000 Units Oral Daily     ertapenem (INVanz) IV  1 g Intravenous Q24H     ferrous sulfate  325 mg Oral Daily with breakfast     furosemide  20 mg Oral Daily     gemfibrozil  600 mg Oral BID     insulin aspart  13 Units Subcutaneous QAM     insulin aspart  11 Units Subcutaneous Daily before lunch     insulin aspart  4 Units Subcutaneous At Bedtime     insulin aspart  1-7 Units Subcutaneous TID AC      insulin aspart  1-5 Units Subcutaneous At Bedtime     insulin aspart  5 Units Subcutaneous Daily with supper     insulin detemir  7 Units Subcutaneous QAM AC     levothyroxine (SYNTHROID/LEVOTHROID) tablet 25 mcg  25 mcg Oral Once per day on Sun Tue Wed Thu Sat     [START ON 5/11/2018] levothyroxine (SYNTHROID/LEVOTHROID) tablet 50 mcg  50 mcg Oral Once per day on Mon Fri     lisinopril (PRINIVIL/ZESTRIL) tablet 15 mg  15 mg Oral Daily     metoprolol succinate  50 mg Oral Daily     sodium chloride (PF)  3 mL Intracatheter Q8H       Data   All microbiology laboratory data reviewed.  Recent Labs   Lab Test  05/09/18   0645  05/08/18   0850  05/07/18   1012   WBC  3.6*  4.2  5.1   HGB  8.8*  9.3*  8.9*   HCT  26.9*  28.6*  27.3*   MCV  109*  110*  110*   PLT  50*  44*  43*     Recent Labs   Lab Test  05/09/18   0645  05/08/18   0850  05/07/18   1012   CR  0.97  1.02  1.16     Recent Labs   Lab Test  11/17/16   0938   SED  126*     Recent Labs   Lab Test  05/07/18   0956  01/20/18   0439  01/20/18   0424  01/20/18   0314  12/13/17   1049  12/01/17   1600  10/24/17   0929  10/23/17   1219  02/13/17   1530   CULT  >100,000 colonies/mL  Escherichia coli ESBL  Additional susceptibilities in progress  5/10/18  *  >100,000 colonies/mL  Strain 2  Escherichia coli ESBL  *  ESBL (extended beta lactamase) producing organisms require contact precautions.  Enterobacteriaceae that are susceptible to meropenem are usually susceptible to ertapenem.  No growth  No growth  >100,000 colonies/mL  Escherichia coli ESBL  *  >100,000 colonies/mL  Strain 2  Escherichia coli ESBL  *  Enterobacteriaceae that are susceptible to meropenem are usually susceptible to ertapenem.  ESBL (extended beta lactamase) producing organisms require contact precautions.  >100,000 colonies/mL  Escherichia coli ESBL  ESBL (extended beta lactamase) producing organisms require contact precautions.  *  50,000 to 100,000 colonies/mL  Escherichia coli ESBL  *   10,000 to 50,000 colonies/mL  Escherichia coli ESBL  *  <10,000 colonies/mL  urogenital janneth    ESBL (extended beta lactamase) producing organisms require contact precautions.  >100,000 colonies/mL  Escherichia coli  *  >100,000 colonies/mL  Escherichia coli  *  10,000 to 50,000 colonies/mL Enterobacter cloacae complex  <10,000 colonies/mL urogenital janneth Susceptibility testing not routinely done  *

## 2018-05-10 NOTE — PROGRESS NOTES
"[facility] PHYSICIANS  HEMATOLOGY ONCOLOGY    DIAGNOSIS:    1- Kappa light chain IgM multiple myeloma in a 89-year-old patient.  Bone marrow biopsy on 11/17/2016 showed hypercellular bone marrow with 65% cellularity of light chain restricted plasma cells.  FISH or G-banding could not be performed due to insufficient sample.   There is a background of myelodysplastic syndrome.  The patient was initially seen in the hospital on 11/17/2016 for macrocytic anemia and pancytopenia and transfusion requirement and a bone marrow biopsy was performed.   2- History of prostate cancer s/p EBRT s/p brachytherapy in 2003 (recent PSA 0.10), superficial bladder cancer ,no recurrences since 1986    Currently admitted with hematuria and UTI.       TREATMENT: 12/15/16 velcade/dex. 4/12/17 added cyclophosphamide 300 mg weekly.6/28/17 we discontinued velcade due to concern for neuropathy and continued with weekly cyclophosphamide and dexamethasone.   1/2/18 switched to Carfilzomib and dexamethasone.   3/14/18 started on Daratumumab and dexamethasone.       SUBJECTIVE:  The patient was seen in his room. He was sitting on chair. He did not have nay new complaints.      REVIEW OF SYSTEMS:  A complete review of systems was performed and found to be negative other than pertinent positives mentioned in history of present illness.      Past medical, social histories reviewed.     Meds- Reviewed.      PHYSICAL EXAMINATION:   VITAL SIGNS:/50  Pulse 71  Temp 97.8  F (36.6  C)  Resp 16  Ht 1.753 m (5' 9\")  Wt 88.8 kg (195 lb 12.8 oz)  SpO2 91%  BMI 28.91 kg/m2  CONSTITUTIONAL: Sitting comfortably.   NEUROLOGIC: Alert, awake.   PSYCH: Mood and affect was normal.    LABORATORY DATA AND IMAGING REVIEWED DURING THIS VISIT:  Recent Labs   Lab Test  05/09/18   0645  05/08/18   0850   03/26/18   0607  03/23/18   0532   01/21/18   0834  01/20/18   0220   NA  137  134   < >  141  135   < >  140  139   POTASSIUM  4.3  4.1   < >  " 3.6  4.2   < >  4.1  4.1   CHLORIDE  105  102   < >  108  106   < >  108  107   CO2  23  25   < >  25  20   < >  22  22   ANIONGAP  9  7   < >  8  9   < >  10  10   BUN  27  30   < >  22  36*   < >  29  45*   CR  0.97  1.02   < >  0.91  1.06   < >  1.02  1.45*   GLC  106*  205*   < >  134*  446*   < >  183*  75   MICHELLE  9.1  9.5   < >  8.7  8.0*   < >  8.8  9.1   MAG   --    --    --   2.0  1.8   < >   --   2.2   PHOS   --    --    --    --    --    --   2.9  2.2*    < > = values in this interval not displayed.     Recent Labs   Lab Test  05/09/18   0645  05/08/18   0850  05/07/18   1012  05/02/18   1017   WBC  3.6*  4.2  5.1  6.3   HGB  8.8*  9.3*  8.9*  9.0*   PLT  50*  44*  43*  46*   MCV  109*  110*  110*  109*   NEUTROPHIL  63.8   --   73.0  80.0     Recent Labs   Lab Test  05/02/18   1017  04/11/18   0806  04/05/18   0835   11/17/16   0938   BILITOTAL  0.4  0.5  0.5   < >   --    ALKPHOS  85  80  77   < >   --    ALT  13  11  12   < >   --    AST  12  13  7   < >   --    ALBUMIN  2.7*  2.6*  2.7*   < >   --    LDH   --    --    --    --   110    < > = values in this interval not displayed.     ECOG PS: 1     ASSESSMENT AND RECOMMENDATIONS:  This is a 89-year-old gentleman who has kappa light chain multiple myeloma with hypercellular marrow with 65% of cells are light chain restricted plasma cells.  He had severe pancytopenia and has needed a transfusion in the hospital.  He did not have hypercalcemia and his renal function was normal. He has a background of myelodysplastic syndrome on his bone marrow biopsy; however, majority of the cell population was monoclonal plasma cell population.   He was started on treatment due to cytopenia.   In 4/2017 his light chain levels were getting worse. M spike was stable. We added cyclophosphamide to the regimen. In 6/2017 discontinued velcade for concern of development of neuropathy, in hindsight the pain appeared to be related to arthritis.   He was switched to Daratumumab  and Dex on 3/14/18. The first dose resulted in reaction, he was admitted to the hospital with A fib and had complications/pneumonia.   He is on Aranesp 300 mcg SQ every three weeks.  He is on Velcade and dexamethasone     - He is currently admitted with UTI/Hematuria.   - labs are consistent with disease progression and we will discuss about switching treatment after he is discharged.   - Monitor his counts. Agree with treatment for UTI.   - We will sign off and I will see him in clinic after discharge.     Gretel Quinn MD   5/9/2018

## 2018-05-11 NOTE — MR AVS SNAPSHOT
After Visit Summary   5/11/2018    Roc Parkinson    MRN: [id number]           Patient Information     Date Of Birth          7/7/1926        Visit Information        Provider Department      5/11/2018 12:00 PM SH INFUSION CHAIR 16 [facility]        Today's Diagnoses     Acute hemorrhagic cystitis    -  1       Follow-ups after your visit        Your next 10 appointments already scheduled     May 12, 2018  1:00 PM CDT   Level 1 with SH INFUSION CHAIR 2   [facility] ([facility])    [facility]  [address]  [place] [address]   [phone]            May 13, 2018 11:30 AM CDT   Level 1 with SH INFUSION CHAIR 3   [facility] ([facility])    [facility]  [address]  [place] [address]   [phone]            May 14, 2018 11:00 AM CDT   Level 1 with SH INFUSION CHAIR 15   [facility] ([facility])    [facility]  [address]  [place] [address]   [phone]            May 15, 2018 11:00 AM CDT   Level 1 with SH INFUSION CHAIR 20   [facility] ([facility])    [facility]  [address]  [place] [address]   [phone]            May 16, 2018  9:30 AM CDT   Level 1 with SH INFUSION CHAIR 9   [facility] ([facility])    [facility]  [address]  [place] [address]   [phone]            May 16, 2018  2:15 PM CDT   Return Visit with Gretel Quinn MD   [facility] ([facility])    [facility]  [address]  [place] [address]   [phone]            May 17, 2018 12:30 PM CDT   Level 1 with SH  "INFUSION CHAIR 4   [facility] ([facility])    [facility]  [address]  [place] [address]   [phone]            May 18, 2018 10:30 AM CDT   Level 1 with SH INFUSION CHAIR 15   [facility] ([facility])    [facility]  [address]  [place] [address]   [phone]            May 19, 2018 11:00 AM CDT   Level 1 with SH INFUSION CHAIR 3   [facility] ([facility])    [facility]  [address]  [place] [address]   [phone]            May 20, 2018 12:00 PM CDT   Level 1 with SH INFUSION CHAIR 8   [facility] ([facility])    [place] [id number]  [place] [address]   [phone]              Who to contact     If you have questions or need follow up information about today's clinic visit or your schedule please contact [facility] directly at [phone].  Normal or non-critical lab and imaging results will be communicated to you by Pockeehart, letter or phone within 4 business days after the clinic has received the results. If you do not hear from us within 7 days, please contact the clinic through Pockeehart or phone. If you have a critical or abnormal lab result, we will notify you by phone as soon as possible.  Submit refill requests through [employer] or call your pharmacy and they will forward the refill request to us. Please allow 3 business days for your refill to be completed.          Additional Information About Your Visit        [employer]en[employer] Information     [employer] lets you send messages to your doctor, view your test results, renew your prescriptions, schedule appointments and more. To sign up, go to [email or web address] . Click on \"Log in\" on the " "left side of the screen, which will take you to the Welcome page. Then click on \"Sign up Now\" on the right side of the page.     You will be asked to enter the access code listed below, as well as some personal information. Please follow the directions to create your username and password.     Your access code is: [email or web address]  Expires: 2018 11:49 AM     Your access code will  in 90 days. If you need help or a new code, please call your [facility] or [phone].        Care EveryWhere ID     This is your Care EveryWhere ID. This could be used by other organizations to access your [place] medical records  [id number]        Your Vitals Were     Pulse Temperature Respirations             78 97.6  F (36.4  C) (Oral) 16          Blood Pressure from Last 3 Encounters:   18 112/53   05/10/18 124/60   18 122/62    Weight from Last 3 Encounters:   05/10/18 88.8 kg (195 lb 12.8 oz)   18 92 kg (202 lb 13.2 oz)   18 92.2 kg (203 lb 3.2 oz)              We Performed the Following     AST     CBC with platelets differential     Creatinine        Primary Care Provider Office Phone # Fax #    Dickson Reich [phone] [facility] [address]        Equal Access to Services     [facility]: Hadii aad ku hadasho Somaeali, waaxda luqadaha, qaybta kaalmada adeegyada, melita shaw . So [facility] [phone].    ATENCIÓN: Si habla español, tiene a nolan disposición servicios gratuitos de asistencia lingüística. Willyame al [phone].    We comply with applicable federal civil rights laws and [place] laws. We do not discriminate on the basis of race, color, national origin, age, disability, sex, sexual orientation, or gender identity.            Thank you!     Thank you for choosing [facility]  for your care. Our goal is always to provide you with excellent care. Hearing back from our " patients is one way we can continue to improve our services. Please take a few minutes to complete the written survey that you may receive in the mail after your visit with us. Thank you!             Your Updated Medication List - Protect others around you: Learn how to safely use, store and throw away your medicines at www.disposemymeds.org.          This list is accurate as of 5/11/18 12:30 PM.  Always use your most recent med list.                   Brand Name Dispense Instructions for use Diagnosis    ACYCLOVIR PO      Take 400 mg by mouth 2 times daily Oncology to decide when stop date will be        amLODIPine 10 MG tablet    NORVASC    30 tablet    Take 1 tablet (10 mg) by mouth daily    Essential hypertension       B-D U/F 31G X 8 MM   Generic drug:  insulin pen needle     500 each    USE 5 PEN NEEDLES PER DAY OR AS DIRECTED    Type 2 diabetes mellitus with stage 3 chronic kidney disease, with long-term current use of insulin (H)       Blood Glucose Monitoring Suppl Misc      3 times daily (before meals)        dexamethasone 4 MG tablet    DECADRON    30 tablet    Take 10 tablets (40 mg) by mouth on Days 1, 8, and 15.    Multiple myeloma not having achieved remission (H)       ertapenem 1 GM injection    INVanz    100 mL    Inject 1 g into the vein every 24 hours for 10 days CBC with differential, creatinine, SGOT weekly while on this medication to be faxed to Dr. Shaffer office.    Acute hemorrhagic cystitis       ferrous sulfate 325 (65 Fe) MG tablet    IRON     Take 325 mg by mouth daily (with breakfast)        FREESTYLE LITE test strip   Generic drug:  blood glucose monitoring     400 strip    USE TO TEST FOUR TIMES DAILY    Uncontrolled type 1 diabetes mellitus with stage 3 chronic kidney disease (H)       furosemide 20 MG tablet    LASIX    30 tablet    TAKE 1 TABLET BY MOUTH EVERY DAY    Multiple myeloma not having achieved remission (H)       gemfibrozil 600 MG tablet    LOPID    180 tablet    TAKE 1  TABLET BY MOUTH TWICE DAILY    Hyperlipidemia       * INSULIN ASPART SC      Inject 13 Units Subcutaneous every morning        * INSULIN ASPART SC      Inject 11 Units Subcutaneous daily (before lunch)        * INSULIN ASPART SC      Inject Subcutaneous At Bedtime Inject 4 unit subcutaneously at bedtime for Diabetes II Ok to use Humalog insulin with same order details        * INSULIN ASPART SC      Inject Subcutaneous 4 times daily Sliding scale: 140-175 1 unit 176-210 2 units 211-245 3 units 246-280 4 units 281-315 5 units 316-350 6 units 351-385 7 units 386-420 8 units >420 9 units        * insulin aspart 100 UNITS/ML injection    NovoLOG     Inject 5 Units Subcutaneous daily (with dinner)    Type 2 diabetes mellitus with stage 3 chronic kidney disease, with long-term current use of insulin (H)       insulin detemir 100 UNIT/ML injection    LEVEMIR FLEXPEN/FLEXTOUCH    15 mL    Inject 7 Units Subcutaneous every morning (before breakfast)    Type 2 diabetes mellitus with diabetic chronic kidney disease, unspecified CKD stage, unspecified long term insulin use status (H), Uncontrolled type 2 diabetes mellitus with hyperglycemia, unspecified long term insulin use status (H), Type 2 diabetes mellitus with stage 3 chronic kidney disease, with long-term current use of insulin (H)       LISINOPRIL PO      Take 15 mg by mouth daily        LORazepam 0.5 MG tablet    ATIVAN    30 tablet    Take 1 tablet (0.5 mg) by mouth every 4 hours as needed (Anxiety, Nausea/Vomiting or Sleep)    Multiple myeloma not having achieved remission (H)       metoprolol succinate 50 MG 24 hr tablet    TOPROL-XL    30 tablet    Take 1 tablet (50 mg) by mouth daily    Atrial fibrillation with rapid ventricular response (H)       polyethylene glycol Packet    MIRALAX/GLYCOLAX    7 packet    Take 17 g by mouth 2 times daily as needed (constipation)    Slow transit constipation       prochlorperazine 10 MG tablet    COMPAZINE    30 tablet    Take 1  tablet (10 mg) by mouth every 6 hours as needed (Nausea/Vomiting)    Multiple myeloma not having achieved remission (H)       Selenium 200 MCG Tabs      Take 100 mcg by mouth daily. Pt takes one half tab of the 200 mcg daily        * SYNTHROID PO      Take 50 mcg by mouth twice a week Monday and Friday        * LEVOTHYROXINE SODIUM PO      Take 25 mcg by mouth five times a week Tuesday, Wednesday, Thursday, Saturday, Sunday        VITAMIN C PO      Take 500 mg by mouth daily        VITAMIN D (CHOLECALCIFEROL) PO      Take 1,000 Units by mouth daily        * Notice:  This list has 7 medication(s) that are the same as other medications prescribed for you. Read the directions carefully, and ask your doctor or other care provider to review them with you.

## 2018-05-11 NOTE — PROGRESS NOTES
Clinic Care Coordination Contact    Clinic Care Coordination Contact  OUTREACH    Referral Information:  Referral Source: IP Handoff  Chart reviewed.  Patient treated at [facility] 5/7-5/10 for UTI.  Patient discharged home with out patient infusion orders.    Primary Diagnosis: Genitourinary Disorders (E coli UTI)    Chief Complaint   Patient presents with     Clinic Care Coordination - Post Hospital        Universal Utilization: No concerns  Clinic Utilization  Difficulty keeping appointments:: No  Utilization    Last refreshed: 5/11/2018  3:17 PM:  No Show Count (past year) 0       Last refreshed: 5/11/2018  3:17 PM:  ED visits 0       Last refreshed: 5/11/2018  3:17 PM:  Hospital admissions 3          Current as of: 5/11/2018  3:17 PM           Clinical Concerns:  Current Medical Concerns:  Spoke with patient over phone.  Admitted to hospital for treatment of UTI.  Pt states he is voiding.  Denies fever.   Pt lives alone.  Does not have any home services.   He stated he feels good, but weak.  He did go to infusion appointment today.  Pt has orders for Ertapenem for 10 more days.  Has midline.  ID was following in hospital.    Patient drove self.      Current Behavioral Concerns: Patient is alert and oriented.  [place].  Able to let needs be known.       Introduced patient to care coordination.    Pain  Chronic pain (GOAL):: No  Health Maintenance Reviewed: Due/Overdue    HF ACTION PLAN Q3 YR  7/7/1926       OP ANNUAL INR REFERRAL  7/10/2016       LIPID MONITORING Q1 YEAR  5/23/2018           Medication Management:  Medications reviewed.  He manages his own meds, has no questions or concerns.      Functional Status:  Dependent ADLs:: Ambulation-walker  Bed or wheelchair confined:: No  Mobility Status: Independent w/Device  Fallen 2 or more times in the past year?: No  Any fall with injury in the past year?: No    Living Situation:  Current living arrangement:: I live alone  Type of residence::  (Senior  "apartment builiding)    Diet/Exercise/Sleep:  Patient stated that he was feeling weak. \"my legs can't hold me'. He stated he was able to go to infusion appointment, he drove self.  I asked if he was interested in doing PT he declined.     Patient stated he is eating and drinking good. He cooks his own food and does own grocery shopping.    Diet:: Regular  Inadequate nutrition (GOAL):: No  Food Insecurity: No  Tube Feeding: No  Exercise:: Currently not exercising  Inadequate activity/exercise (GOAL):: No  Significant changes in sleep pattern (GOAL): No    Transportation:  Transportation concerns (GOAL):: No  Transportation means:: Regular car     Psychosocial:  Patient states only family that lives near by is his twin brother, who lives in [place].  Patient's 3 son's live out of state.  He does not have any socialization with neighbors or friends.  When asked if he participates is scheduled activites in his building he says \"no, I would rather be alone'.  I explained this isn't good for you mental health and you need to have people to talk to .  He reluctantly agreed to set a goal of meeting some of his neighbors in his building.      Baptism or spiritual beliefs that impact treatment:: No  Mental health DX:: No  Mental health management concern (GOAL):: No  Informal Support system:: Family (Twin brother in [place])      Financial/Insurance:   Financial/Insurance concerns (GOAL):: No       Equipment Currently Used at Home: walker, rolling, grab bar    Advance Care Plan/Directive  Advanced Care Plans/Directives on file:: Yes  Type Advanced Care Plans/Directives: POLST          Goals:   Goals        Lifestyle    increase socialization.      Notes - Note created  5/11/2018  3:19 PM by Maura Reveles, RN    Goal Statement: I will meet my neighbors  Measure of Success: patient will meet 1 new person in his building each week.  Supportive Steps to Achieve: increase socialization.  Participate in scheduled group " activities.  Barriers: pt states he likes to be alone.  Strengths: pt is willing to try  Date to Achieve By: 1 month            Pt reports understanding and denies any additional questions or concerns at this times. RN CC engaged in AIDET communication during encounter.     Future Appointments              Tomorrow  INFUSION CHAIR 2 [facility]    In 2 days SH INFUSION CHAIR 3 [facility]    In 3 days SH INFUSION CHAIR 15 [facility]    In 4 days SH INFUSION CHAIR 20 [facility]    In 5 days SH INFUSION CHAIR 9 [facility]    In 5 days Gretel Quinn MD [facility]    In 6 days  INFUSION CHAIR 4 [facility]    In 1 week  INFUSION CHAIR 15 [facility]    In 1 week  INFUSION CHAIR 3 [facility]    In 1 week  INFUSION CHAIR 8 [facility]    In 1 week Dickson Reich MD [facility]    In 1 week  INFUSION CHAIR 16 [facility]    In 2 weeks  INFUSION CHAIR 5 [facility]    In 3 weeks  INFUSION CHAIR 19 [facility]    In 3 weeks Gretel Quinn MD [facility]    In 1 month  INFUSION CHAIR 1 [facility]    In 1 month Kellee Meeks PA-C [facility], [facility]    In 1 month  INFUSION CHAIR 6 [facility]    In 1 month  INFUSION CHAIR 3  [facility] [place] [place] [facility]    In 1 month  INFUSION CHAIR 7 [facility] [place] [facility]    In 2 months  INFUSION CHAIR 3 [facility] [place] [facility]    In 2 months  INFUSION CHAIR 3 [facility] [place] [place] [facility]          Plan: Patient will continue to go to OP infusion as scheduled.  Patient will work toward goal of meeting 1 new person per week in his building.  RN CC will f/u with patient on 5/14 to see if weakness has improved.  Pt does not have any PCP follow ups scheduled., but will see oncologist 5/16 at 2:15pm.    Maura Reveles RN-BSN  Care Coordination  Phone:  [phone]  Email: [email or web address]

## 2018-05-12 NOTE — MR AVS SNAPSHOT
After Visit Summary   5/12/2018    Roc Parkinson    MRN: [id number]           Patient Information     Date Of Birth          7/7/1926        Visit Information        Provider Department      5/12/2018 1:00 PM SH INFUSION CHAIR 2 [facility]        Today's Diagnoses     Acute hemorrhagic cystitis    -  1       Follow-ups after your visit        Your next 10 appointments already scheduled     May 12, 2018  1:00 PM CDT   Level 1 with SH INFUSION CHAIR 2   [facility] ([facility])    [facility]  [address]  [place] [address]   [phone]            May 13, 2018 11:30 AM CDT   Level 1 with SH INFUSION CHAIR 3   [facility])    [facility]  [address]  [place] [address]   [phone]            May 14, 2018 11:00 AM CDT   Level 1 with SH INFUSION CHAIR 15   [facility])    [facility]  [address]  [place] [address]   [phone]            May 15, 2018 11:00 AM CDT   Level 1 with SH INFUSION CHAIR 20   [facility])    [facility]  [address]  [place] [address]   [phone]            May 16, 2018  9:30 AM CDT   Level 1 with SH INFUSION CHAIR 9   [facility])    [facility]  [address]  [place] [address]   [phone]            May 16, 2018  2:15 PM CDT   Return Visit with Gretel Quinn MD   [facility]    [facility]  [address]  [place] [address]   [phone]            May 17, 2018 12:30 PM CDT   Level 1 with SH INFUSION  "CHAIR 4   [facility] ([facility])    [facility]  [address]  [place] [address]   [phone]            May 18, 2018 10:30 AM CDT   Level 1 with SH INFUSION CHAIR 15   [facility])    [facility]  [address]  [place] [address]   [phone]            May 19, 2018 11:00 AM CDT   Level 1 with SH INFUSION CHAIR 3   [facility])    [facility]  [address]  [place] [address]   [phone]            May 20, 2018 12:00 PM CDT   Level 1 with SH INFUSION CHAIR 8   [facility] ([facility])    [place] [address]  [place] [address]   [phone]              Who to contact     If you have questions or need follow up information about today's clinic visit or your schedule please contact [facility] directly at [phone].  Normal or non-critical lab and imaging results will be communicated to you by [employer], letter or phone within 4 business days after the clinic has received the results. If you do not hear from us within 7 days, please contact the clinic through MyChart or phone. If you have a critical or abnormal lab result, we will notify you by phone as soon as possible.  Submit refill requests through [employer] or call your pharmacy and they will forward the refill request to us. Please allow 3 business days for your refill to be completed.          Additional Information About Your Visit        [employer] Information     [employer] lets you send messages to your doctor, view your test results, renew your prescriptions, schedule appointments and more. To sign up, go to [email or web address] . Click on \"Log in\" on the left " "side of the screen, which will take you to the Welcome page. Then click on \"Sign up Now\" on the right side of the page.     You will be asked to enter the access code listed below, as well as some personal information. Please follow the directions to create your username and password.     Your access code is: [email or web address]  Expires: 2018 11:49 AM     Your access code will  in 90 days. If you need help or a new code, please call your [facility] clinic or [phone].        Care EveryWhere ID     This is your Care EveryWhere ID. This could be used by other organizations to access your [facility] medical records  [id number]        Your Vitals Were     Pulse Temperature Respirations             81 97.8  F (36.6  C) (Oral) 16          Blood Pressure from Last 3 Encounters:   18 112/56   18 112/53   05/10/18 124/60    Weight from Last 3 Encounters:   05/10/18 88.8 kg (195 lb 12.8 oz)   18 92 kg (202 lb 13.2 oz)   18 92.2 kg (203 lb 3.2 oz)              Today, you had the following     No orders found for display       Primary Care Provider Office Phone # Fax #    Dickson Reich [phone] [address] [facility] [address]        Goals        Lifestyle    increase socialization.      Notes - Note created  2018  3:19 PM by Maura Reveles RN    Goal Statement: I will meet my neighbors  Measure of Success: patient will meet 1 new person in his building each week.  Supportive Steps to Achieve: increase socialization.  Participate in scheduled group activities.  Barriers: pt states he likes to be alone.  Strengths: pt is willing to try  Date to Achieve By: 1 month          Equal Access to Services     ELIE GAGNON : Jeanen Driver, waemoryda luwhitney, qaybta kaalmada paddy, melita rodriguez. So [facility] [phone].    ATENCIÓN: Si habla español, tiene a nolan disposición servicios gratuitos de asistencia lingüística. " Eric fong [phone].    We comply with applicable federal civil rights laws and [place] laws. We do not discriminate on the basis of race, color, national origin, age, disability, sex, sexual orientation, or gender identity.            Thank you!     Thank you for choosing [facility]  for your care. Our goal is always to provide you with excellent care. Hearing back from our patients is one way we can continue to improve our services. Please take a few minutes to complete the written survey that you may receive in the mail after your visit with us. Thank you!             Your Updated Medication List - Protect others around you: Learn how to safely use, store and throw away your medicines at www.disposemymeds.org.          This list is accurate as of 5/12/18 12:58 PM.  Always use your most recent med list.                   Brand Name Dispense Instructions for use Diagnosis    ACYCLOVIR PO      Take 400 mg by mouth 2 times daily Oncology to decide when stop date will be        amLODIPine 10 MG tablet    NORVASC    30 tablet    Take 1 tablet (10 mg) by mouth daily    Essential hypertension       B-D U/F 31G X 8 MM   Generic drug:  insulin pen needle     500 each    USE 5 PEN NEEDLES PER DAY OR AS DIRECTED    Type 2 diabetes mellitus with stage 3 chronic kidney disease, with long-term current use of insulin (H)       Blood Glucose Monitoring Suppl Misc      3 times daily (before meals)        dexamethasone 4 MG tablet    DECADRON    30 tablet    Take 10 tablets (40 mg) by mouth on Days 1, 8, and 15.    Multiple myeloma not having achieved remission (H)       ertapenem 1 GM injection    INVanz    100 mL    Inject 1 g into the vein every 24 hours for 10 days CBC with differential, creatinine, SGOT weekly while on this medication to be faxed to Dr. Shaffer office.    Acute hemorrhagic cystitis       ferrous sulfate 325 (65 Fe) MG tablet    IRON     Take 325 mg by mouth daily (with  breakfast)        FREESTYLE LITE test strip   Generic drug:  blood glucose monitoring     400 strip    USE TO TEST FOUR TIMES DAILY    Uncontrolled type 1 diabetes mellitus with stage 3 chronic kidney disease (H)       furosemide 20 MG tablet    LASIX    30 tablet    TAKE 1 TABLET BY MOUTH EVERY DAY    Multiple myeloma not having achieved remission (H)       gemfibrozil 600 MG tablet    LOPID    180 tablet    TAKE 1 TABLET BY MOUTH TWICE DAILY    Hyperlipidemia       * INSULIN ASPART SC      Inject 13 Units Subcutaneous every morning        * INSULIN ASPART SC      Inject 11 Units Subcutaneous daily (before lunch)        * INSULIN ASPART SC      Inject Subcutaneous At Bedtime Inject 4 unit subcutaneously at bedtime for Diabetes II Ok to use Humalog insulin with same order details        * INSULIN ASPART SC      Inject Subcutaneous 4 times daily Sliding scale: 140-175 1 unit 176-210 2 units 211-245 3 units 246-280 4 units 281-315 5 units 316-350 6 units 351-385 7 units 386-420 8 units >420 9 units        * insulin aspart 100 UNITS/ML injection    NovoLOG     Inject 5 Units Subcutaneous daily (with dinner)    Type 2 diabetes mellitus with stage 3 chronic kidney disease, with long-term current use of insulin (H)       insulin detemir 100 UNIT/ML injection    LEVEMIR FLEXPEN/FLEXTOUCH    15 mL    Inject 7 Units Subcutaneous every morning (before breakfast)    Type 2 diabetes mellitus with diabetic chronic kidney disease, unspecified CKD stage, unspecified long term insulin use status (H), Uncontrolled type 2 diabetes mellitus with hyperglycemia, unspecified long term insulin use status (H), Type 2 diabetes mellitus with stage 3 chronic kidney disease, with long-term current use of insulin (H)       LISINOPRIL PO      Take 15 mg by mouth daily        LORazepam 0.5 MG tablet    ATIVAN    30 tablet    Take 1 tablet (0.5 mg) by mouth every 4 hours as needed (Anxiety, Nausea/Vomiting or Sleep)    Multiple myeloma not having  achieved remission (H)       metoprolol succinate 50 MG 24 hr tablet    TOPROL-XL    30 tablet    Take 1 tablet (50 mg) by mouth daily    Atrial fibrillation with rapid ventricular response (H)       polyethylene glycol Packet    MIRALAX/GLYCOLAX    7 packet    Take 17 g by mouth 2 times daily as needed (constipation)    Slow transit constipation       prochlorperazine 10 MG tablet    COMPAZINE    30 tablet    Take 1 tablet (10 mg) by mouth every 6 hours as needed (Nausea/Vomiting)    Multiple myeloma not having achieved remission (H)       Selenium 200 MCG Tabs      Take 100 mcg by mouth daily. Pt takes one half tab of the 200 mcg daily        * SYNTHROID PO      Take 50 mcg by mouth twice a week Monday and Friday        * LEVOTHYROXINE SODIUM PO      Take 25 mcg by mouth five times a week Tuesday, Wednesday, Thursday, Saturday, Sunday        VITAMIN C PO      Take 500 mg by mouth daily        VITAMIN D (CHOLECALCIFEROL) PO      Take 1,000 Units by mouth daily        * Notice:  This list has 7 medication(s) that are the same as other medications prescribed for you. Read the directions carefully, and ask your doctor or other care provider to review them with you.

## 2018-05-12 NOTE — PROGRESS NOTES
Infusion Nursing Note:  Roc RICARDO Parkinson presents today for Invanz.    Patient seen by provider today: No   present during visit today: Not Applicable.    Note: No new concerns since infusion yesterday, states strength slowly improving. Creat 1.46 noted from yesterday's labs, ok to proceed with invanz per Mackenzie Yang.    Intravenous Access:  Midline.    Treatment Conditions:  Lab Results   Component Value Date    HGB 8.5 05/11/2018     Lab Results   Component Value Date    WBC 4.5 05/11/2018      Lab Results   Component Value Date    ANEU 3.1 05/11/2018     Lab Results   Component Value Date    PLT 73 05/11/2018        Lab Results   Component Value Date    CR 1.46 05/11/2018                     Lab Results   Component Value Date    AST 13 05/11/2018     Results reviewed, ok to proceed with treatment.      Post Infusion Assessment:  Patient tolerated infusion without incident.  Blood return noted pre and post infusion.  Site patent and intact, free from redness, edema or discomfort.  No evidence of extravasations.    Discharge Plan:   Discharge instructions reviewed with: Patient.  Patient and/or family verbalized understanding of discharge instructions and all questions answered.  Copy of AVS reviewed with patient and/or family.  Patient will return tomorrow for next appointment.  Patient discharged in stable condition accompanied by: self.  Departure Mode: Ambulatory.    Eliana Carpenter RN

## 2018-05-13 NOTE — PROGRESS NOTES
Infusion Nursing Note:  Roc Parkinson presents today for Invanz.    Patient seen by provider today: No   present during visit today: Not Applicable.    Note: Denies any new medical concerns since yesterday.    Intravenous Access:  Midline.    Treatment Conditions:  Not Applicable.    Post Infusion Assessment:  Patient tolerated infusion without incident.  Blood return noted pre and post infusion.  Site patent and intact, free from redness, edema or discomfort.  No evidence of extravasations.    Discharge Plan:   Discharge instructions reviewed with: Patient.  Patient and/or family verbalized understanding of discharge instructions and all questions answered.  Copy of AVS reviewed with patient and/or family.  Patient will return 5/14/2108 for next appointment.  Patient discharged in stable condition accompanied by: self.  Departure Mode: Ambulatory.    Patsy Singh RN

## 2018-05-13 NOTE — MR AVS SNAPSHOT
After Visit Summary   5/13/2018    Roc Parkinson    MRN: [id number]           Patient Information     Date Of Birth          7/7/1926        Visit Information        Provider Department      5/13/2018 11:30 AM SH INFUSION CHAIR 3 [facility]        Today's Diagnoses     Acute hemorrhagic cystitis    -  1       Follow-ups after your visit        Your next 10 appointments already scheduled     May 14, 2018 11:00 AM CDT   Level 1 with SH INFUSION CHAIR 15   [facility] ([facility])    [facility]  [address]  [place] [address]   [phone]            May 15, 2018 11:00 AM CDT   Level 1 with SH INFUSION CHAIR 20   [facility] ([facility])    Critical access hospital [facility]  [address]  [place] [address]   [phone]            May 16, 2018  9:30 AM CDT   Level 1 with SH INFUSION CHAIR 9   [facility] ([facility])    [facility]  [address]  [place] [address]   [phone]            May 16, 2018  2:15 PM CDT   Return Visit with Gretel Quinn MD   [facility] ([facility])    [facility]  [address]  [place] [address]   [phone]            May 17, 2018 12:30 PM CDT   Level 1 with SH INFUSION CHAIR 4   [facility] ([facility])    [facility]  [address]  [place] [address]   [phone]            May 18, 2018 10:30 AM CDT   Level 1 with SH INFUSION CHAIR 15   [facility] ([facility])    [facility]  [address]  [place] [address]   [phone]            May 19, 2018 11:00 AM CDT   Level 1 with SH  "INFUSION CHAIR 3   [facility] ([facility])    [facility]  [address]  [place] [address]   [phone]            May 20, 2018 12:00 PM CDT   Level 1 with SH INFUSION CHAIR 8   [facility] ([facility] [place])    [facility]  [address]  [place] [address]   [phone]            May 22, 2018 10:15 AM CDT   SHORT with Dickson Reich MD   [facility] ([facility])    [address] [place] [id number] [phone]            May 23, 2018 10:00 AM CDT   Level 1 with  INFUSION CHAIR 16   [facility] ([facility] [place])    [place] [id number] [facility] [address]  [facility] [address] [phone]              Who to contact     If you have questions or need follow up information about today's clinic visit or your schedule please contact [facility] directly at [phone].  Normal or non-critical lab and imaging results will be communicated to you by Tyromerhart, letter or phone within 4 business days after the clinic has received the results. If you do not hear from us within 7 days, please contact the clinic through MyChart or phone. If you have a critical or abnormal lab result, we will notify you by phone as soon as possible.  Submit refill requests through Oversee or call your pharmacy and they will forward the refill request to us. Please allow 3 business days for your refill to be completed.          Additional Information About Your Visit        Oversee Information     Oversee lets you send messages to your doctor, view your test results, renew your prescriptions, schedule appointments and more. To sign up, go to [email or web address] . Click on \"Log in\" on the " "left side of the screen, which will take you to the Welcome page. Then click on \"Sign up Now\" on the right side of the page.     You will be asked to enter the access code listed below, as well as some personal information. Please follow the directions to create your username and password.     Your access code is: [email or web address]  Expires: 2018 11:49 AM     Your access code will  in 90 days. If you need help or a new code, please call your [facility] or [phone].        Care EveryWhere ID     This is your Care EveryWhere ID. This could be used by other organizations to access your [facility] medical records  [id number]        Your Vitals Were     Pulse Temperature Respirations             80 97.4  F (36.3  C) (Oral) 16          Blood Pressure from Last 3 Encounters:   18 130/61   18 112/56   18 112/53    Weight from Last 3 Encounters:   05/10/18 88.8 kg (195 lb 12.8 oz)   18 92 kg (202 lb 13.2 oz)   18 92.2 kg (203 lb 3.2 oz)              Today, you had the following     No orders found for display       Primary Care Provider Office Phone # Fax #    Dickson Reich [phone] [address] [facility] [address]        Goals        Lifestyle    increase socialization.      Notes - Note created  2018  3:19 PM by Maura Reveles RN    Goal Statement: I will meet my neighbors  Measure of Success: patient will meet 1 new person in his building each week.  Supportive Steps to Achieve: increase socialization.  Participate in scheduled group activities.  Barriers: pt states he likes to be alone.  Strengths: pt is willing to try  Date to Achieve By: 1 month          Equal Access to Services     ELIE GAGNON : Jeanne Driver, waemoryda marilou, qaybta kaalmada paddy, melita rodriguez. So [facility] [phone].    ATENCIÓN: Si habla español, tiene a nolan disposición servicios gratuitos de asistencia " lingüística. Eric al [phone].    We comply with applicable federal civil rights laws and [place] laws. We do not discriminate on the basis of race, color, national origin, age, disability, sex, sexual orientation, or gender identity.            Thank you!     Thank you for choosing [facility]  for your care. Our goal is always to provide you with excellent care. Hearing back from our patients is one way we can continue to improve our services. Please take a few minutes to complete the written survey that you may receive in the mail after your visit with us. Thank you!             Your Updated Medication List - Protect others around you: Learn how to safely use, store and throw away your medicines at www.disposemymeds.org.          This list is accurate as of 5/13/18 11:40 AM.  Always use your most recent med list.                   Brand Name Dispense Instructions for use Diagnosis    ACYCLOVIR PO      Take 400 mg by mouth 2 times daily Oncology to decide when stop date will be        amLODIPine 10 MG tablet    NORVASC    30 tablet    Take 1 tablet (10 mg) by mouth daily    Essential hypertension       B-D U/F 31G X 8 MM   Generic drug:  insulin pen needle     500 each    USE 5 PEN NEEDLES PER DAY OR AS DIRECTED    Type 2 diabetes mellitus with stage 3 chronic kidney disease, with long-term current use of insulin (H)       Blood Glucose Monitoring Suppl Misc      3 times daily (before meals)        dexamethasone 4 MG tablet    DECADRON    30 tablet    Take 10 tablets (40 mg) by mouth on Days 1, 8, and 15.    Multiple myeloma not having achieved remission (H)       ertapenem 1 GM injection    INVanz    100 mL    Inject 1 g into the vein every 24 hours for 10 days CBC with differential, creatinine, SGOT weekly while on this medication to be faxed to Dr. Shaffer office.    Acute hemorrhagic cystitis       ferrous sulfate 325 (65 Fe) MG tablet    IRON     Take 325 mg by mouth  daily (with breakfast)        FREESTYLE LITE test strip   Generic drug:  blood glucose monitoring     400 strip    USE TO TEST FOUR TIMES DAILY    Uncontrolled type 1 diabetes mellitus with stage 3 chronic kidney disease (H)       furosemide 20 MG tablet    LASIX    30 tablet    TAKE 1 TABLET BY MOUTH EVERY DAY    Multiple myeloma not having achieved remission (H)       gemfibrozil 600 MG tablet    LOPID    180 tablet    TAKE 1 TABLET BY MOUTH TWICE DAILY    Hyperlipidemia       * INSULIN ASPART SC      Inject 13 Units Subcutaneous every morning        * INSULIN ASPART SC      Inject 11 Units Subcutaneous daily (before lunch)        * INSULIN ASPART SC      Inject Subcutaneous At Bedtime Inject 4 unit subcutaneously at bedtime for Diabetes II Ok to use Humalog insulin with same order details        * INSULIN ASPART SC      Inject Subcutaneous 4 times daily Sliding scale: 140-175 1 unit 176-210 2 units 211-245 3 units 246-280 4 units 281-315 5 units 316-350 6 units 351-385 7 units 386-420 8 units >420 9 units        * insulin aspart 100 UNITS/ML injection    NovoLOG     Inject 5 Units Subcutaneous daily (with dinner)    Type 2 diabetes mellitus with stage 3 chronic kidney disease, with long-term current use of insulin (H)       insulin detemir 100 UNIT/ML injection    LEVEMIR FLEXPEN/FLEXTOUCH    15 mL    Inject 7 Units Subcutaneous every morning (before breakfast)    Type 2 diabetes mellitus with diabetic chronic kidney disease, unspecified CKD stage, unspecified long term insulin use status (H), Uncontrolled type 2 diabetes mellitus with hyperglycemia, unspecified long term insulin use status (H), Type 2 diabetes mellitus with stage 3 chronic kidney disease, with long-term current use of insulin (H)       LISINOPRIL PO      Take 15 mg by mouth daily        LORazepam 0.5 MG tablet    ATIVAN    30 tablet    Take 1 tablet (0.5 mg) by mouth every 4 hours as needed (Anxiety, Nausea/Vomiting or Sleep)    Multiple myeloma  not having achieved remission (H)       metoprolol succinate 50 MG 24 hr tablet    TOPROL-XL    30 tablet    Take 1 tablet (50 mg) by mouth daily    Atrial fibrillation with rapid ventricular response (H)       polyethylene glycol Packet    MIRALAX/GLYCOLAX    7 packet    Take 17 g by mouth 2 times daily as needed (constipation)    Slow transit constipation       prochlorperazine 10 MG tablet    COMPAZINE    30 tablet    Take 1 tablet (10 mg) by mouth every 6 hours as needed (Nausea/Vomiting)    Multiple myeloma not having achieved remission (H)       Selenium 200 MCG Tabs      Take 100 mcg by mouth daily. Pt takes one half tab of the 200 mcg daily        * SYNTHROID PO      Take 50 mcg by mouth twice a week Monday and Friday        * LEVOTHYROXINE SODIUM PO      Take 25 mcg by mouth five times a week Tuesday, Wednesday, Thursday, Saturday, Sunday        VITAMIN C PO      Take 500 mg by mouth daily        VITAMIN D (CHOLECALCIFEROL) PO      Take 1,000 Units by mouth daily        * Notice:  This list has 7 medication(s) that are the same as other medications prescribed for you. Read the directions carefully, and ask your doctor or other care provider to review them with you.

## 2018-05-14 NOTE — ED PROVIDER NOTES
History     Chief Complaint:  Fall (was dizzy which not unsual for pt, fell back and hit back of head on chair. no loc but on blood thinners. )       HPI   Roc Parkinson is a 89 year old male, with multiple myeloma, who presents via EMS after fall. The patient notes that he was moving a basket of laundry onto his walker today when he thinks he slipped causing him to fall backwards landing onto his head. He denies chest pain, palpitations, acute dizziness prior to onset up all. He does note he has weakness, dizziness, and fatigue more than normal since starting daily treatment for urinary tract infection at the [facility] clinic with IV Invanz. He has a left PICC catheter. He denies loss of consciousness and is able to describe the whole incident. He was unable to get back up, which he states is common for the patient, and although 2 neighbors tried to assist getting him up they were unable to help him and therefore EMS was called.  He denies any current symptoms including headache, neck pain, back pain, leg or extremity pain.    Allergies:      Medications:      ACYCLOVIR PO   amLODIPine (NORVASC) 10 MG tablet   Ascorbic Acid (VITAMIN C PO)   B-D U/F 31G X 8 MM insulin pen needle   Blood Glucose Monitoring Suppl MISC   dexamethasone (DECADRON) 4 MG tablet   ertapenem (INVANZ) 1 GM injection   ferrous sulfate (IRON) 325 (65 FE) MG tablet   FREESTYLE LITE test strip   furosemide (LASIX) 20 MG tablet   gemfibrozil (LOPID) 600 MG tablet   insulin aspart (NOVOLOG) 100 UNITS/ML injection   INSULIN ASPART SC   INSULIN ASPART SC   INSULIN ASPART SC   INSULIN ASPART SC   insulin detemir (LEVEMIR FLEXPEN/FLEXTOUCH) 100 UNIT/ML injection   Levothyroxine Sodium (SYNTHROID PO)   LEVOTHYROXINE SODIUM PO   LISINOPRIL PO   LORazepam (ATIVAN) 0.5 MG tablet   metoprolol succinate (TOPROL-XL) 50 MG 24 hr tablet   polyethylene glycol (MIRALAX/GLYCOLAX) Packet   prochlorperazine (COMPAZINE) 10 MG tablet   Selenium 200  MCG TABS   VITAMIN D, CHOLECALCIFEROL, PO   [DISCONTINUED] cyclophosphamide (CYTOXAN) 50 MG capsule CHEMO     Past Medical History:    Past Medical History:   Diagnosis Date     Arthritis      Atrial fibrillation (H)      Blood transfusion      Diabetes mellitus (H)      Heart disease 2014     Hyperlipidemia      Hypertension      Hypothyroidism 8/21/2014     Malignant neoplasm (H)        Patient Active Problem List    Diagnosis Date Noted     Acute hemorrhagic cystitis 05/10/2018     Priority: Medium     Hematuria 05/07/2018     Priority: Medium     Atrial fibrillation with RVR (H) 03/22/2018     Priority: Medium     UTI due to extended-spectrum beta lactamase (ESBL) producing Escherichia coli 01/29/2018     Priority: Medium     Essential hypertension 01/29/2018     Priority: Medium     Physical deconditioning 01/29/2018     Priority: Medium     Chemotherapy-induced neutropenia (H) 06/27/2017     Priority: Medium     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  Document previously scanned on 12/13/16.  Order reviewed and found to be valid.  Code Status needs to be updated to reflect choices in most recent ACP document. Order for DNR/DNI.  Confirmed/documented designated decision maker(s).  Added by Kath Danielle RN, Advance Care Planning Liaison.         Hyperkalemia 02/22/2017     Priority: Medium     Urinary tract infection 02/14/2017     Priority: Medium     Hyperglycemia 12/17/2016     Priority: Medium     Hypercalcemia 12/15/2016     Priority: Medium     Multiple myeloma not having achieved remission (H) 11/23/2016     Priority: Medium     GIB (gastrointestinal bleeding) 11/13/2016     Priority: Medium     Macrocytic anemia  10/01/2016     Priority: Medium     Long-term (current) use of anticoagulants [Z79.01] 03/21/2016     Priority: Medium     Hypothyroidism 02/29/2016     Priority: Medium     CKD (chronic kidney disease) stage 3, GFR 30-59 ml/min 10/14/2015     Priority: Medium     Hyperlipidemia      Priority: Medium     Health Care Home 08/19/2014     Priority: Medium     State Tier Level:  Tier 1  Status:  Declined  Care Coordinator:  Olga Lidia Oneil Rn  See Letters for [facility] Care Plan  Date:  August 19, 2014         A-fib (H) 08/14/2014     Priority: Medium     Obesity 08/12/2014     Priority: Medium     UTI (urinary tract infection) w hx of recurrence 08/12/2014     Priority: Medium     Iron deficiency anemia 08/12/2014     Priority: Medium     Nonsmoker 08/12/2014     Priority: Medium     Microalbuminuria 11/12/2013     Priority: Medium     Irritable bowel syndrome 10/09/2013     Priority: Medium     Vitamin D deficiency disease 10/09/2013     Priority: Medium     Type 2 diabetes mellitus with stage 3 chronic kidney disease, with long-term current use of insulin (H) 07/29/2013     Priority: Medium     CTS (carpal tunnel syndrome) 01/28/2013     Priority: Medium     Oral lesion 12/23/2012     Priority: Medium     Dysphagia 10/22/2012     Priority: Medium     Problem list name updated by automated process. Provider to review       Malignant neoplasm of prostate (H) 10/22/2012     Priority: Medium     Malignant neoplasm of bladder (H) 10/22/2012     Priority: Medium     Problem list name updated by automated process. 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     Priority: Medium      Past Surgical History:    Past Surgical History:   Procedure Laterality Date     ARTHROPLASTY KNEE  11/5/2012    Procedure: ARTHROPLASTY KNEE;  RIGHT TOTAL KNEE ARTHROPLASTY (SMITH & NEPHEW)^;  Surgeon:  Dickson Schulte MD;  Location:  OR     BIOPSY      bladder     COLONOSCOPY  2007     COLONOSCOPY N/A 11/15/2016    Procedure: COMBINED COLONOSCOPY, SINGLE OR MULTIPLE BIOPSY/POLYPECTOMY BY BIOPSY;  Surgeon: Kenneth Fulton MD;  Location:  GI     GENITOURINARY SURGERY      TURP x2 and bladder scraping     GI SURGERY      anal fistula     ORTHOPEDIC SURGERY      lt knee in nov.2009     PHACOEMULSIFICATION CLEAR CORNEA WITH STANDARD INTRAOCULAR LENS IMPLANT Left 10/25/2017    Procedure: PHACOEMULSIFICATION CLEAR CORNEA WITH STANDARD INTRAOCULAR LENS IMPLANT;  LEFT EYE PHACOEMULSIFICATION CLEAR CORNEA WITH STANDARD INTRAOCULAR LENS IMPLANT ;  Surgeon: Shady Haider MD;  Location:  EC     PHACOEMULSIFICATION CLEAR CORNEA WITH STANDARD INTRAOCULAR LENS IMPLANT Right 11/1/2017    Procedure: PHACOEMULSIFICATION CLEAR CORNEA WITH STANDARD INTRAOCULAR LENS IMPLANT;  RIGHT EYE PHACOEMULSIFICATION CLEAR CORNEA WITH STANDARD INTRAOCULAR LENS IMPLANT;  Surgeon: Shady Haider MD;  Location:  EC     SOFT TISSUE SURGERY      melanoma      Family History:    family history includes Cardiovascular (age of onset: 81) in his father; Endocrine Disease (age of onset: 74) in his brother.    Social History:   reports that he has never smoked. He has never used smokeless tobacco. He reports that he does not drink alcohol or use illicit drugs.    PCP: Dickson Reich   Review of Systems   Constitutional: Positive for fatigue. Negative for fever.   Respiratory: Negative for chest tightness and shortness of breath.    Cardiovascular: Negative for chest pain and palpitations.   Gastrointestinal: Negative for abdominal pain, nausea and vomiting.   Genitourinary: Negative for dysuria.   Musculoskeletal: Negative for back pain and neck pain.   Skin: Negative for rash and wound.   Neurological: Positive for dizziness and weakness. Negative for tremors, seizures, syncope, speech difficulty, light-headedness, numbness  "and headaches.   Hematological: Does not bruise/bleed easily.     Physical Exam     Patient Vitals for the past 24 hrs:   BP Temp Temp src Heart Rate SpO2 Height Weight   05/14/18 1600 146/75 98.4  F (36.9  C) Oral 82 97 % 1.753 m (5' 9\") 90.7 kg (200 lb)      Physical Exam  General: Alert. Well kept.  HEENT:   Head: No facial asymmetry. No palpable scalp hematomas or bony step offs. No frontal or maxillary facial tenderness.   Eyes: Normal conjunctiva. No scleral icterus. PERRLA. EOMI. No raccoon s eyes.   Ears: Normal pinnae. Normal external auditory canals. Normal tympanic membranes. No hemotympanum bilaterally. No Posada's signs.   Nose: No deformity. No nasal drainage.   Throat: Moist mucous membranes. No evidence for intraoral trauma.   Neck: Supple, no nuchal rigidity. No midline tenderness over cervical spine or paraspinal musculature. Normal range of motion.   Cardiac: Normal rate and regular rhythm. Normal heart sounds. No murmurs, rubs, or gallops appreciated. 2+ radial pulses.  Pulmonary: CTA bilaterally. Normal breath sounds. No wheezing, crackles, or rhonchi appreciated.   Abdomen: Soft, non-tender, non-distended. No rebound or guarding.   Neuro: GCS 15. Alert and oriented. Cranial nerves II-XII intact. 5/5 strength equal bilateral upper and lower extremities. Gait smooth using walker. Finger-nose-finger coordinated and equal bilateral. Visual fields bilateral without deficit.  MUSCULOSKELETAL: Normal gross range of motion of all 4 extremities. No peripheral edema. No midline tenderness over thoracic, lumbar or sacral spine.   No pain with ROM of bilateral shoulders, elbows, wrists, hips, knees, ankles  SKIN: Skin is warm and dry. No rashes, petechiae or pallor. Normal appearance of visualized exposed skin.   PSYCH: Normal affect and mood. Good eye contact.    Emergency Department Course   ECG:  @ 1647  Indication: fall  Vent. Rate 80 bpm. AK interval 208 ms. QRS duration 136 ms. QT/QTc 414/477 ms. " P-R-T axis 53 14 12.   Normal sinus rhythm. Right bundle branch block. Abnormal ECG.    Read @ 7874 by Dr. Garg.    Imaging:  Cervical spine CT w/o contrast   Preliminary Result   IMPRESSION:    1. No fracture or acute abnormality.   2. Degenerative changes as described.      Head CT w/o contrast   Preliminary Result   IMPRESSION:    1. Atrophy.   2. Chronic white matter disease.   3. Nothing acute.         Laboratory:  Labs Ordered and Resulted from Time of ED Arrival Up to the Time of Departure from the ED   CBC WITH PLATELETS DIFFERENTIAL - Abnormal; Notable for the following:        Result Value    WBC 3.6 (*)     RBC Count 2.25 (*)     Hemoglobin 8.1 (*)     Hematocrit 25.0 (*)      (*)     MCH 36.0 (*)     RDW 19.9 (*)     Platelet Count 107 (*)     All other components within normal limits   BASIC METABOLIC PANEL - Abnormal; Notable for the following:     Glucose 174 (*)     Urea Nitrogen 38 (*)     GFR Estimate 60 (*)     All other components within normal limits   TROPONIN I      Emergency Department Course:  Past medical records, nursing notes, and vitals reviewed.  I performed an exam of the patient and obtained history, as documented above.    1750 recheck the patient.  Able to ambulate without difficulty using walker.  Requesting discharge home.   I rechecked the patient. Findings and plan explained to the Patient. Patient was discharged home.    Impression & Plan    Medical Decision Making:  Roc Parkinson is a 89 year old male, with multiple myeloma, who presents via EMS after fall.  Differential diagnosis considered acute coronary syndrome, stroke, TIA, dehydration, electrolyte abnormality, infection, mechanical fall. On examination the patient has a normal neurologic examination and denies pain on detailed exam. He was seen today at the [facility] for treatment of his urinary tract infection. He is also scheduled to have Velcaid injection with Dr. Quinn this Thursday for  treatment of his multiple myeloma.  CBC and BMP were obtained today showing a white count of 3.6 which is just mildly lower than normal with an absolute neutrophil count of 2.2.  Platelets are 107. Hemoglobin is 8.1 which is unchanged from prior. His glucose is 174. EKG and troponin today are without change from baseline. Head CT and cervical spine CT are without acute changes. Patient was able to ambulate without difficulty to the bathroom using a walker. Discussed admission with the patient who declines today and is requesting discharge home. He will follow up at the [facility] tomorrow for IV antibiotics. He was discharged home in stable condition in the care of his family.    Diagnosis:    ICD-10-CM    1. Fall, initial encounter W19.XXXA       Discharge Medications:  Discharge Medication List as of 5/14/2018  6:31 PM        5/14/2018   [place], Tayler, CNP        [place], Tayler, CNP  05/15/18 0034       [place], Tayler, CNP  05/15/18 0046

## 2018-05-14 NOTE — ED NOTES
Bed: ED11  Expected date: 5/14/18  Expected time: 3:42 PM  Means of arrival: Ambulance  Comments:  535  89M fall eTA 1555

## 2018-05-14 NOTE — ED AVS SNAPSHOT
Emergency Department    [address] [facility] [address]    Phone:  [phone]    Fax:  [phone]                                       Roc Parkinson   MRN: [id number]    Department:   Emergency Department   Date of Visit:  5/14/2018           Patient Information     Date Of Birth          7/7/1926        Your diagnoses for this visit were:     Fall, initial encounter        You were seen by Tayler Snyder CNP.      Follow-up Information     Follow up with Dickson Reich MD In 1 day.    Specialty:  Family Practice    Contact information:    [address]  [facility] [id number] [phone]          Follow up with  Emergency Department.    Specialty:  EMERGENCY MEDICINE    Why:  As needed, If symptoms worsen    Contact information:    [address] [facility] [address] [phone]        Discharge Instructions         Fall with Uncertain Cause  You have had a fall today. But the cause of your fall is not certain. Falls can happen due to slipping, tripping or losing your balance. A fall can also happen from a fainting spell or seizure.  While a fall can happen for a simple reason (tripping over something), falls in elderly people are often caused by a combination of things:    Age-related decline in function with worsening balance, stability, vision, and muscle strength    Chronic illness, such as heart arrhythmias, heart valve disease, vascular disease, COPD, diabetes, strokes, or arthritis    Shoes that do not give much support and make you prone to slip or slide    Anemia or low blood pressure     Effects or side effects of medicines    Dehydration or recent use of alcohol    Environmental hazards, such as uneven or slippery ground, unfamiliar place, obstacles, uneven surfaces, or slippery ground    Situational factors (related to the activity being done, such as rushing to the bathroom)  Because the cause of your fall today is not certain, it is  possible that a fainting spell or seizure was the cause. This means that it could happen again, without warning. If you fall again, without a cause, then you should return to this facility promptly to have further tests. Otherwise, follow up with your healthcare provider as explained below.  It is normal to feel sore and tight in your muscles and back the next day, and not just the muscles you initially injured. Remember, all the parts of your body are connected, so while initially one area hurts, the next day another may hurt. Also, when you injure yourself, it causes inflammation, which then causes the muscles to tighten up and hurt more. After the initial worsening, it should gradually improve over the next few days. However, more severe pain should be reported.  Even without a definite head injury, you can still get a concussion. Concussions and even bleeding can still happen, especially if you have had a recent injury or take blood thinner medicine. It is not unusual to have a mild headache and feel tired and even nauseous or dizzy.  Home care    Rest today and resume your normal activities as soon as you are feeling back to normal. It is best to remain with someone who can check on you for the next 24 hours to watch for another episode of falling.    If you were injured during the fall, follow the advice from your healthcare provider regarding care of your injury.    If you become lightheaded or dizzy, lie down right away or sit and lean forward with your head down.    As a precaution, don't drive a car or operate dangerous equipment, don't take a bath alone (use a shower instead), and don't swim alone until you see your healthcare provider. A condition causing fainting or seizures must be ruled out before resuming these activities.    You may use acetaminophen or ibuprofen to control pain, unless another pain medicine was prescribed. If you have chronic liver or kidney disease or ever had a stomach ulcer  or gastrointestinal bleeding, talk with your healthcare provider before using these medicines.    Keep your appointments for any further testing that may have been scheduled for you.  Follow-up care  Follow up with your healthcare provider, or as advised.  If X-rays or CT scan were done, you will be notified if there is a change in the reading, especially if it affects treatment.  Call 911  Call 911 if any of these happen:    Trouble breathing    Confused or difficulty arousing    Fainting or loss of consciousness    Rapid or very slow heart rate    Seizure    Difficulty with speech or vision, weakness of an arm or leg    Difficulty walking or talking, loss of balance, numbness or weakness in one side of your body, facial droop  When to seek medical advice  Call your healthcare provider right away if any of these happen:    Another unexplained fall    Dizziness    Severe headache    Nausea and vomiting    Blood in vomit, stools (black or red color)  Date Last Reviewed: 11/1/2017 2000-2017 [employer]. [address] [place] [address]. All rights reserved. This information is not intended as a substitute for professional medical care. Always follow your healthcare professional's instructions.          Your next 10 appointments already scheduled     May 15, 2018 11:00 AM CDT   Level 1 with  INFUSION CHAIR 20   [facility] ([facility])    [facility]  [address]  [facility] [address]   [phone]            May 16, 2018  9:30 AM CDT   Level 1 with  INFUSION CHAIR 9   [facility] ([facility])    [facility]  [address]  [facility] [address]   [phone]            May 16, 2018  2:15 PM CDT   Return Visit with Grteel Quinn MD   [facility] ([facility])    [facility]  [address]  [place] [address]   [phone]            May 17, 2018 12:30 PM CDT   Level 1 with SH INFUSION CHAIR 4   [facility] ([facility])    [facility]  [address]  [place] [address]   [phone]            May 18, 2018 10:30 AM CDT   Level 1 with SH INFUSION CHAIR 15   [facility] ([facility])    [facility]  [address]  [place] [address]   [phone]            May 19, 2018 11:00 AM CDT   Level 1 with SH INFUSION CHAIR 3   [facility] ([facility])    [facility]  [address]  [place] [address]   [phone]            May 20, 2018 12:00 PM CDT   Level 1 with SH INFUSION CHAIR 8   [facility] ([facility])    [facility]  [address]  [place] [address]   [phone]            May 22, 2018 10:15 AM CDT   SHORT with Dcikson Reich MD   [facility]    [address] [place] [address]   [phone]            May 23, 2018 10:00 AM CDT   Level 1 with SH INFUSION CHAIR 16   [facility] ([facility])    [facility]  [address]  [place] [address]   [phone]            May 30, 2018 10:00 AM CDT   Level 1 with SH INFUSION CHAIR 5   [facility] ([facility])    [facility]  [address]  [place] [address]   [phone]              24 Hour Appointment Hotline       To make an appointment at any [facility], call [phone] ([phone]). If you don't have a family doctor or clinic, we will  help you find one. [place] clinics are conveniently located to serve the needs of you and your family.             Review of your medicines      Our records show that you are taking the medicines listed below. If these are incorrect, please call your family doctor or clinic.        Dose / Directions Last dose taken    ACYCLOVIR PO   Dose:  400 mg        Take 400 mg by mouth 2 times daily Oncology to decide when stop date will be   Refills:  0        amLODIPine 10 MG tablet   Commonly known as:  NORVASC   Dose:  10 mg   Quantity:  30 tablet        Take 1 tablet (10 mg) by mouth daily   Refills:  0        B-D U/F 31G X 8 MM   Quantity:  500 each   Generic drug:  insulin pen needle        USE 5 PEN NEEDLES PER DAY OR AS DIRECTED   Refills:  1        Blood Glucose Monitoring Suppl Misc        3 times daily (before meals)   Refills:  0        dexamethasone 4 MG tablet   Commonly known as:  DECADRON   Quantity:  30 tablet        Take 10 tablets (40 mg) by mouth on Days 1, 8, and 15.   Refills:  0        ertapenem 1 GM injection   Commonly known as:  INVanz   Dose:  1 g   Quantity:  100 mL        Inject 1 g into the vein every 24 hours for 10 days CBC with differential, creatinine, SGOT weekly while on this medication to be faxed to Dr. Shaffer office.   Refills:  0        ferrous sulfate 325 (65 Fe) MG tablet   Commonly known as:  IRON   Dose:  325 mg        Take 325 mg by mouth daily (with breakfast)   Refills:  0        FREESTYLE LITE test strip   Quantity:  400 strip   Generic drug:  blood glucose monitoring        USE TO TEST FOUR TIMES DAILY   Refills:  0        furosemide 20 MG tablet   Commonly known as:  LASIX   Quantity:  30 tablet        TAKE 1 TABLET BY MOUTH EVERY DAY   Refills:  0        gemfibrozil 600 MG tablet   Commonly known as:  LOPID   Quantity:  180 tablet        TAKE 1 TABLET BY MOUTH TWICE DAILY   Refills:  1        * INSULIN ASPART SC   Dose:  13 Units        Inject 13 Units Subcutaneous every  morning   Refills:  0        * INSULIN ASPART SC   Dose:  11 Units        Inject 11 Units Subcutaneous daily (before lunch)   Refills:  0        * INSULIN ASPART SC        Inject Subcutaneous At Bedtime Inject 4 unit subcutaneously at bedtime for Diabetes II Ok to use Humalog insulin with same order details   Refills:  0        * INSULIN ASPART SC        Inject Subcutaneous 4 times daily Sliding scale: 140-175 1 unit 176-210 2 units 211-245 3 units 246-280 4 units 281-315 5 units 316-350 6 units 351-385 7 units 386-420 8 units >420 9 units   Refills:  0        * insulin aspart 100 UNITS/ML injection   Commonly known as:  NovoLOG   Dose:  5 Units        Inject 5 Units Subcutaneous daily (with dinner)   Refills:  0        insulin detemir 100 UNIT/ML injection   Commonly known as:  LEVEMIR FLEXPEN/FLEXTOUCH   Dose:  7 Units   Quantity:  15 mL        Inject 7 Units Subcutaneous every morning (before breakfast)   Refills:  1        LISINOPRIL PO   Dose:  15 mg        Take 15 mg by mouth daily   Refills:  0        LORazepam 0.5 MG tablet   Commonly known as:  ATIVAN   Dose:  0.5 mg   Quantity:  30 tablet        Take 1 tablet (0.5 mg) by mouth every 4 hours as needed (Anxiety, Nausea/Vomiting or Sleep)   Refills:  3        metoprolol succinate 50 MG 24 hr tablet   Commonly known as:  TOPROL-XL   Dose:  50 mg   Quantity:  30 tablet        Take 1 tablet (50 mg) by mouth daily   Refills:  0        polyethylene glycol Packet   Commonly known as:  MIRALAX/GLYCOLAX   Dose:  17 g   Quantity:  7 packet        Take 17 g by mouth 2 times daily as needed (constipation)   Refills:  0        prochlorperazine 10 MG tablet   Commonly known as:  COMPAZINE   Dose:  10 mg   Quantity:  30 tablet        Take 1 tablet (10 mg) by mouth every 6 hours as needed (Nausea/Vomiting)   Refills:  3        Selenium 200 MCG Tabs   Dose:  100 mcg        Take 100 mcg by mouth daily. Pt takes one half tab of the 200 mcg daily   Refills:  0        *  SYNTHROID PO   Dose:  50 mcg        Take 50 mcg by mouth twice a week Monday and Friday   Refills:  0        * LEVOTHYROXINE SODIUM PO   Dose:  25 mcg        Take 25 mcg by mouth five times a week Tuesday, Wednesday, Thursday, Saturday, Sunday   Refills:  0        VITAMIN C PO   Dose:  500 mg        Take 500 mg by mouth daily   Refills:  0        VITAMIN D (CHOLECALCIFEROL) PO   Dose:  1000 Units        Take 1,000 Units by mouth daily   Refills:  0        * Notice:  This list has 7 medication(s) that are the same as other medications prescribed for you. Read the directions carefully, and ask your doctor or other care provider to review them with you.            Procedures and tests performed during your visit     Basic metabolic panel    CBC with platelets + differential    Cervical spine CT w/o contrast    EKG 12 lead    Head CT w/o contrast    Troponin I (now)      Orders Needing Specimen Collection     None      Pending Results     Date and Time Order Name Status Description    5/14/2018 1635 Cervical spine CT w/o contrast Preliminary     5/14/2018 1635 Head CT w/o contrast Preliminary             Pending Culture Results     No orders found from 5/12/2018 to 5/15/2018.            Pending Results Instructions     If you had any lab results that were not finalized at the time of your Discharge, you can call the ED Lab Result RN at [phone]. You will be contacted by this team for any positive Lab results or changes in treatment. The nurses are available 7 days a week from 10A to 6:30P.  You can leave a message 24 hours per day and they will return your call.        Test Results From Your Hospital Stay        5/14/2018  5:00 PM      Component Results     Component Value Ref Range & Units Status    WBC 3.6 (L) 4.0 - 11.0 10e9/L Final    RBC Count 2.25 (L) 4.4 - 5.9 10e12/L Final    Hemoglobin 8.1 (L) 13.3 - 17.7 g/dL Final    Hematocrit 25.0 (L) 40.0 - 53.0 % Final     (H) 78 - 100 fl Final    MCH 36.0 (H)  26.5 - 33.0 pg Final    MCHC 32.4 31.5 - 36.5 g/dL Final    RDW 19.9 (H) 10.0 - 15.0 % Final    Platelet Count 107 (L) 150 - 450 10e9/L Final    Diff Method Automated Method  Final    % Neutrophils 62.5 % Final    % Lymphocytes 28.3 % Final    % Monocytes 7.0 % Final    % Eosinophils 1.1 % Final    % Basophils 0.3 % Final    % Immature Granulocytes 0.8 % Final    Nucleated RBCs 0 0 /100 Final    Absolute Neutrophil 2.2 1.6 - 8.3 10e9/L Final    Absolute Lymphocytes 1.0 0.8 - 5.3 10e9/L Final    Absolute Monocytes 0.3 0.0 - 1.3 10e9/L Final    Absolute Eosinophils 0.0 0.0 - 0.7 10e9/L Final    Absolute Basophils 0.0 0.0 - 0.2 10e9/L Final    Abs Immature Granulocytes 0.0 0 - 0.4 10e9/L Final    Absolute Nucleated RBC 0.0  Final         5/14/2018  5:15 PM      Component Results     Component Value Ref Range & Units Status    Sodium 139 133 - 144 mmol/L Final    Potassium 4.5 3.4 - 5.3 mmol/L Final    Chloride 109 94 - 109 mmol/L Final    Carbon Dioxide 22 20 - 32 mmol/L Final    Anion Gap 8 3 - 14 mmol/L Final    Glucose 174 (H) 70 - 99 mg/dL Final    Urea Nitrogen 38 (H) 7 - 30 mg/dL Final    Creatinine 1.14 0.66 - 1.25 mg/dL Final    GFR Estimate 60 (L) >60 mL/min/1.7m2 Final    Non  GFR Calc    GFR Estimate If Black 73 >60 mL/min/1.7m2 Final    African American GFR Calc    Calcium 9.0 8.5 - 10.1 mg/dL Final         5/14/2018  5:18 PM      Narrative     CT HEAD WITHOUT CONTRAST  5/14/2018 5:12 PM    HISTORY: Fall, striking posterior head with unknown loss of  consciousness.      TECHNIQUE: Scans were obtained through the head without IV contrast.  Radiation dose for this scan was reduced using automated exposure  control, adjustment of the mA and/or kV according to patient size, or  iterative reconstruction technique.    COMPARISON: None.    FINDINGS: Moderate atrophy. Mild chronic white matter disease likely  small vessel ischemic change.  No hemorrhage, mass lesion, or focal  area of acute  infarction identified. There is a cyst or polyp in the  right maxillary antrum. No bony abnormality. Benign calcification of  the anterior falx noted.        Impression     IMPRESSION:   1. Atrophy.  2. Chronic white matter disease.  3. Nothing acute.         5/14/2018  5:19 PM      Narrative     CT CERVICAL SPINE WITHOUT CONTRAST  5/14/2018 5:13 PM     HISTORY: Neck pain after falling.     TECHNIQUE: Axial images of the cervical spine were obtained without  intravenous contrast. Multiplanar reformations were performed.  Radiation dose for this scan was reduced using automated exposure  control, adjustment of the mA and/or kV according to patient size, or  iterative reconstruction technique.    COMPARISON: None.    FINDINGS: There is no evidence of fracture.    Alignment: Normal.     Craniocervical junction: Normal.     C1-C2: Normal.     C2-C3: Mild facet joint disease bilaterally. No central or lateral  stenosis.     C3-C4: Anterior fusion. There is also fusion of the right facet joint  disease. No central or lateral stenosis.     C4-C5: Facet joint disease on the left.     C5-C6: Moderate narrowing of the interspace. Mild facet joint disease.      C6-C7: No central or lateral stenosis. Mild facet joint disease.     C7-T1:  Normal disc, facet joints, spinal canal and neural foramina.        Impression     IMPRESSION:   1. No fracture or acute abnormality.  2. Degenerative changes as described.         5/14/2018  5:19 PM      Component Results     Component Value Ref Range & Units Status    Troponin I ES <0.015 0.000 - 0.045 ug/L Final    The 99th percentile for upper reference range is 0.045 ug/L.  Troponin values   in the range of 0.045 - 0.120 ug/L may be associated with risks of adverse   clinical events.                  Clinical Quality Measure: Blood Pressure Screening     Your blood pressure was checked while you were in the emergency department today. The last reading we obtained was  BP: 146/75 . Please  "read the guidelines below about what these numbers mean and what you should do about them.  If your systolic blood pressure (the top number) is less than 120 and your diastolic blood pressure (the bottom number) is less than 80, then your blood pressure is normal. There is nothing more that you need to do about it.  If your systolic blood pressure (the top number) is 120-139 or your diastolic blood pressure (the bottom number) is 80-89, your blood pressure may be higher than it should be. You should have your blood pressure rechecked within a year by a primary care provider.  If your systolic blood pressure (the top number) is 140 or greater or your diastolic blood pressure (the bottom number) is 90 or greater, you may have high blood pressure. High blood pressure is treatable, but if left untreated over time it can put you at risk for heart attack, stroke, or kidney failure. You should have your blood pressure rechecked by a primary care provider within the next 4 weeks.  If your provider in the emergency department today gave you specific instructions to follow-up with your doctor or provider even sooner than that, you should follow that instruction and not wait for up to 4 weeks for your follow-up visit.        Thank you for choosing [facility]       Thank you for choosing [facility] for your care. Our goal is always to provide you with excellent care. Hearing back from our patients is one way we can continue to improve our services. Please take a few minutes to complete the written survey that you may receive in the mail after you visit with us. Thank you!        [employer] Information     [employer] lets you send messages to your doctor, view your test results, renew your prescriptions, schedule appointments and more. To sign up, go to [email or web address] . Click on \"Log in\" on the left side of the screen, which will take you to the Welcome page. Then click on \"Sign up Now\" on the right side of the page.     You " will be asked to enter the access code listed below, as well as some personal information. Please follow the directions to create your username and password.     Your access code is: [email or web address]  Expires: 2018 11:49 AM     Your access code will  in 90 days. If you need help or a new code, please call your [facility] clinic or [phone].        Care EveryWhere ID     This is your Care EveryWhere ID. This could be used by other organizations to access your [facility] medical records  [id number]        Equal Access to Services     [facility] : Jeanne Driver, consuelo zamarripa, alyce thomason, melita shaw . So [facility] [phone].    ATENCIÓN: Si habla español, tiene a nolan disposición servicios gratuitos de asistencia lingüística. Llame al [phone].    We comply with applicable federal civil rights laws and [place] laws. We do not discriminate on the basis of race, color, national origin, age, disability, sex, sexual orientation, or gender identity.            After Visit Summary       This is your record. Keep this with you and show to your community pharmacist(s) and doctor(s) at your next visit.

## 2018-05-14 NOTE — DISCHARGE INSTRUCTIONS
Fall with Uncertain Cause  You have had a fall today. But the cause of your fall is not certain. Falls can happen due to slipping, tripping or losing your balance. A fall can also happen from a fainting spell or seizure.  While a fall can happen for a simple reason (tripping over something), falls in elderly people are often caused by a combination of things:    Age-related decline in function with worsening balance, stability, vision, and muscle strength    Chronic illness, such as heart arrhythmias, heart valve disease, vascular disease, COPD, diabetes, strokes, or arthritis    Shoes that do not give much support and make you prone to slip or slide    Anemia or low blood pressure     Effects or side effects of medicines    Dehydration or recent use of alcohol    Environmental hazards, such as uneven or slippery ground, unfamiliar place, obstacles, uneven surfaces, or slippery ground    Situational factors (related to the activity being done, such as rushing to the bathroom)  Because the cause of your fall today is not certain, it is possible that a fainting spell or seizure was the cause. This means that it could happen again, without warning. If you fall again, without a cause, then you should return to this facility promptly to have further tests. Otherwise, follow up with your healthcare provider as explained below.  It is normal to feel sore and tight in your muscles and back the next day, and not just the muscles you initially injured. Remember, all the parts of your body are connected, so while initially one area hurts, the next day another may hurt. Also, when you injure yourself, it causes inflammation, which then causes the muscles to tighten up and hurt more. After the initial worsening, it should gradually improve over the next few days. However, more severe pain should be reported.  Even without a definite head injury, you can still get a concussion. Concussions and even bleeding can still happen,  especially if you have had a recent injury or take blood thinner medicine. It is not unusual to have a mild headache and feel tired and even nauseous or dizzy.  Home care    Rest today and resume your normal activities as soon as you are feeling back to normal. It is best to remain with someone who can check on you for the next 24 hours to watch for another episode of falling.    If you were injured during the fall, follow the advice from your healthcare provider regarding care of your injury.    If you become lightheaded or dizzy, lie down right away or sit and lean forward with your head down.    As a precaution, don't drive a car or operate dangerous equipment, don't take a bath alone (use a shower instead), and don't swim alone until you see your healthcare provider. A condition causing fainting or seizures must be ruled out before resuming these activities.    You may use acetaminophen or ibuprofen to control pain, unless another pain medicine was prescribed. If you have chronic liver or kidney disease or ever had a stomach ulcer or gastrointestinal bleeding, talk with your healthcare provider before using these medicines.    Keep your appointments for any further testing that may have been scheduled for you.  Follow-up care  Follow up with your healthcare provider, or as advised.  If X-rays or CT scan were done, you will be notified if there is a change in the reading, especially if it affects treatment.  Call 911  Call 911 if any of these happen:    Trouble breathing    Confused or difficulty arousing    Fainting or loss of consciousness    Rapid or very slow heart rate    Seizure    Difficulty with speech or vision, weakness of an arm or leg    Difficulty walking or talking, loss of balance, numbness or weakness in one side of your body, facial droop  When to seek medical advice  Call your healthcare provider right away if any of these happen:    Another unexplained fall    Dizziness    Severe  headache    Nausea and vomiting    Blood in vomit, stools (black or red color)  Date Last Reviewed: 11/1/2017 2000-2017 [employer]. [address] [place] [address], [place] [address]. All rights reserved. This information is not intended as a substitute for professional medical care. Always follow your healthcare professional's instructions.

## 2018-05-14 NOTE — MR AVS SNAPSHOT
After Visit Summary   5/14/2018    Roc Parkinson    MRN: [id number]           Patient Information     Date Of Birth          7/7/1926        Visit Information        Provider Department      5/14/2018 11:00 AM SH INFUSION CHAIR 15 [facility]        Today's Diagnoses     Acute hemorrhagic cystitis    -  1    Multiple myeloma not having achieved remission (H)           Follow-ups after your visit        Follow-up notes from your care team     Return in 1 day (on 5/15/2018).      Your next 10 appointments already scheduled     May 15, 2018 11:00 AM CDT   Level 1 with SH INFUSION CHAIR 20   [facility] ([facility])    [facility]  [address]  [place] [address]   [phone]            May 16, 2018  9:30 AM CDT   Level 1 with SH INFUSION CHAIR 9   [facility] ([facility])    [facility]  [address]  [place] [address]   [phone]            May 16, 2018  2:15 PM CDT   Return Visit with Gretel Quinn MD   [facility] ([facility])    [facility]  [address]  [place] [address]   [phone]            May 17, 2018 12:30 PM CDT   Level 1 with SH INFUSION CHAIR 4   [facility] ([facility])    [facility]  [address]  [place] [address]   [phone]            May 18, 2018 10:30 AM CDT   Level 1 with SH INFUSION CHAIR 15   [facility] ([facility])    [facility] [place]  [address]  [place] [address]   [phone]            May 19, 2018 11:00 AM CDT   Level 1 with SH INFUSION CHAIR 3   [facility] ([facility])    [facility]  [facility]  [address]  [place] [address]   [phone]            May 20, 2018 12:00 PM CDT   Level 1 with SH INFUSION CHAIR 8   [facility] ([facility])    [facility] [place]  [address]  [place] [address]   [phone]            May 22, 2018 10:15 AM CDT   SHORT with Dickson Reich MD   [facility] ([facility])    [address] [address]   [phone]            May 23, 2018 10:00 AM CDT   Level 1 with SH INFUSION CHAIR 16   [facility] ([facility])    [facility] [place]  [address]  [place] [address]   [phone]            May 30, 2018 10:00 AM CDT   Level 1 with  INFUSION CHAIR 5   [facility] ([facility])    [facility]  [address]  [place] [address]   [phone]              Who to contact     If you have questions or need follow up information about today's clinic visit or your schedule please contact [facility] directly at [phone].  Normal or non-critical lab and imaging results will be communicated to you by [employer]ok[employer], letter or phone within 4 business days after the clinic has received the results. If you do not hear from us within 7 days, please contact the clinic through [employer] or phone. If you have a critical or abnormal lab result, we will notify you by phone as soon as possible.  Submit refill requests through [employer] or call your pharmacy and they will forward the refill request to us. Please allow 3 business days for your refill to be completed.          Additional Information About Your Visit        [employer] Information     [employer] lets you send messages to your doctor, view your test  "results, renew your prescriptions, schedule appointments and more. To sign up, go to [email or web address] . Click on \"Log in\" on the left side of the screen, which will take you to the Welcome page. Then click on \"Sign up Now\" on the right side of the page.     You will be asked to enter the access code listed below, as well as some personal information. Please follow the directions to create your username and password.     Your access code is: [email or web address]  Expires: 2018 11:49 AM     Your access code will  in 90 days. If you need help or a new code, please call your [place] clinic or [phone].        Care EveryWhere ID     This is your Care EveryWhere ID. This could be used by other organizations to access your [facility] medical records  [id number]        Your Vitals Were     Pulse Temperature Respirations Pulse Oximetry          80 98.1  F (36.7  C) (Oral) 16 97%         Blood Pressure from Last 3 Encounters:   18 143/67   18 130/61   18 112/56    Weight from Last 3 Encounters:   05/10/18 88.8 kg (195 lb 12.8 oz)   18 92 kg (202 lb 13.2 oz)   18 92.2 kg (203 lb 3.2 oz)              We Performed the Following     CBC with platelets differential        Primary Care Provider Office Phone # Fax #    Dickson Reich [phone]       [address] [facility] [address]        Goals        Lifestyle    increase socialization.      Notes - Note created  2018  3:19 PM by Maura Reveles, RN    Goal Statement: I will meet my neighbors  Measure of Success: patient will meet 1 new person in his building each week.  Supportive Steps to Achieve: increase socialization.  Participate in scheduled group activities.  Barriers: pt states he likes to be alone.  Strengths: pt is willing to try  Date to Achieve By: 1 month          Equal Access to Services     ELIE GAGNON AH: Jeanne Driver, osorioda luwhitney, qaybta kaalmelita gold " wilton lewsadie candikeo do'aan ah. So [facility] [phone].    ATENCIÓN: Si nilela angelo, tiene a nolan disposición servicios gratuitos de asistencia lingüística. Eric al [phone].    We comply with applicable federal civil rights laws and [place] laws. We do not discriminate on the basis of race, color, national origin, age, disability, sex, sexual orientation, or gender identity.            Thank you!     Thank you for choosing [facility]  for your care. Our goal is always to provide you with excellent care. Hearing back from our patients is one way we can continue to improve our services. Please take a few minutes to complete the written survey that you may receive in the mail after your visit with us. Thank you!             Your Updated Medication List - Protect others around you: Learn how to safely use, store and throw away your medicines at www.disposemymeds.org.          This list is accurate as of 5/14/18  1:14 PM.  Always use your most recent med list.                   Brand Name Dispense Instructions for use Diagnosis    ACYCLOVIR PO      Take 400 mg by mouth 2 times daily Oncology to decide when stop date will be        amLODIPine 10 MG tablet    NORVASC    30 tablet    Take 1 tablet (10 mg) by mouth daily    Essential hypertension       B-D U/F 31G X 8 MM   Generic drug:  insulin pen needle     500 each    USE 5 PEN NEEDLES PER DAY OR AS DIRECTED    Type 2 diabetes mellitus with stage 3 chronic kidney disease, with long-term current use of insulin (H)       Blood Glucose Monitoring Suppl Misc      3 times daily (before meals)        dexamethasone 4 MG tablet    DECADRON    30 tablet    Take 10 tablets (40 mg) by mouth on Days 1, 8, and 15.    Multiple myeloma not having achieved remission (H)       ertapenem 1 GM injection    INVanz    100 mL    Inject 1 g into the vein every 24 hours for 10 days CBC with differential, creatinine, SGOT weekly while on this  medication to be faxed to Dr. Shaffer office.    Acute hemorrhagic cystitis       ferrous sulfate 325 (65 Fe) MG tablet    IRON     Take 325 mg by mouth daily (with breakfast)        FREESTYLE LITE test strip   Generic drug:  blood glucose monitoring     400 strip    USE TO TEST FOUR TIMES DAILY    Uncontrolled type 1 diabetes mellitus with stage 3 chronic kidney disease (H)       furosemide 20 MG tablet    LASIX    30 tablet    TAKE 1 TABLET BY MOUTH EVERY DAY    Multiple myeloma not having achieved remission (H)       gemfibrozil 600 MG tablet    LOPID    180 tablet    TAKE 1 TABLET BY MOUTH TWICE DAILY    Hyperlipidemia       * INSULIN ASPART SC      Inject 13 Units Subcutaneous every morning        * INSULIN ASPART SC      Inject 11 Units Subcutaneous daily (before lunch)        * INSULIN ASPART SC      Inject Subcutaneous At Bedtime Inject 4 unit subcutaneously at bedtime for Diabetes II Ok to use Humalog insulin with same order details        * INSULIN ASPART SC      Inject Subcutaneous 4 times daily Sliding scale: 140-175 1 unit 176-210 2 units 211-245 3 units 246-280 4 units 281-315 5 units 316-350 6 units 351-385 7 units 386-420 8 units >420 9 units        * insulin aspart 100 UNITS/ML injection    NovoLOG     Inject 5 Units Subcutaneous daily (with dinner)    Type 2 diabetes mellitus with stage 3 chronic kidney disease, with long-term current use of insulin (H)       insulin detemir 100 UNIT/ML injection    LEVEMIR FLEXPEN/FLEXTOUCH    15 mL    Inject 7 Units Subcutaneous every morning (before breakfast)    Type 2 diabetes mellitus with diabetic chronic kidney disease, unspecified CKD stage, unspecified long term insulin use status (H), Uncontrolled type 2 diabetes mellitus with hyperglycemia, unspecified long term insulin use status (H), Type 2 diabetes mellitus with stage 3 chronic kidney disease, with long-term current use of insulin (H)       LISINOPRIL PO      Take 15 mg by mouth daily        LORazepam  0.5 MG tablet    ATIVAN    30 tablet    Take 1 tablet (0.5 mg) by mouth every 4 hours as needed (Anxiety, Nausea/Vomiting or Sleep)    Multiple myeloma not having achieved remission (H)       metoprolol succinate 50 MG 24 hr tablet    TOPROL-XL    30 tablet    Take 1 tablet (50 mg) by mouth daily    Atrial fibrillation with rapid ventricular response (H)       polyethylene glycol Packet    MIRALAX/GLYCOLAX    7 packet    Take 17 g by mouth 2 times daily as needed (constipation)    Slow transit constipation       prochlorperazine 10 MG tablet    COMPAZINE    30 tablet    Take 1 tablet (10 mg) by mouth every 6 hours as needed (Nausea/Vomiting)    Multiple myeloma not having achieved remission (H)       Selenium 200 MCG Tabs      Take 100 mcg by mouth daily. Pt takes one half tab of the 200 mcg daily        * SYNTHROID PO      Take 50 mcg by mouth twice a week Monday and Friday        * LEVOTHYROXINE SODIUM PO      Take 25 mcg by mouth five times a week Tuesday, Wednesday, Thursday, Saturday, Sunday        VITAMIN C PO      Take 500 mg by mouth daily        VITAMIN D (CHOLECALCIFEROL) PO      Take 1,000 Units by mouth daily        * Notice:  This list has 7 medication(s) that are the same as other medications prescribed for you. Read the directions carefully, and ask your doctor or other care provider to review them with you.

## 2018-05-14 NOTE — ED AVS SNAPSHOT
Emergency Department    [address] [place] [address]    Phone:  [phone]    Fax:  [phone]                                       Roc Parkinson   MRN: [id number]    Department:   Emergency Department   Date of Visit:  5/14/2018           After Visit Summary Signature Page     I have received my discharge instructions, and my questions have been answered. I have discussed any challenges I see with this plan with the nurse or doctor.    ..........................................................................................................................................  Patient/Patient Representative Signature      ..........................................................................................................................................  Patient Representative Print Name and Relationship to Patient    ..................................................               ................................................  Date                                            Time    ..........................................................................................................................................  Reviewed by Signature/Title    ...................................................              ..............................................  Date                                                            Time

## 2018-05-14 NOTE — PROGRESS NOTES
Clinic Care Coordination Contact  [facility]/Voicemail       Clinical Data: Care Coordinator Outreach.  Chart reviewed. RN CC follow up outreach.  Patient was at OP infusion appointment today and c/o feeling weak.  Per note, pt was able to drive self home.  Outreach attempted x 1.  Left message on voicemail with call back information and requested return call.  Plan: . Care Coordinator will try to reach patient again in 1-2 business days.    Maura Reveles RN-BSN  Care Coordination  Phone:  [phone]  Email: [email or web address]

## 2018-05-15 NOTE — PROGRESS NOTES
Infusion Nursing Note:  Roc Parkinson presents today for Invanz.    Patient seen by provider today: No   present during visit today: Not Applicable.    Note: Only change to report is a fall last evening and evaluated in the ER. Just sore right thigh..    Intravenous Access:  Midline.    Treatment Conditions:  Not Applicable.      Post Infusion Assessment:  Patient tolerated infusion without incident.  Site patent and intact, free from redness, edema or discomfort.  No evidence of extravasations.  Midline in place.    Discharge Plan:   Discharge instructions reviewed with: Patient.  Patient and/or family verbalized understanding of discharge instructions and all questions answered.  Copy of AVS reviewed with patient and/or family.  Patient will return tomorrow for next appointment.  Patient discharged in stable condition accompanied by: self.  Departure Mode: Ambulatory.    Effie Fuller RN

## 2018-05-15 NOTE — MR AVS SNAPSHOT
After Visit Summary   5/15/2018    Roc Parkinson    MRN: [id number]           Patient Information     Date Of Birth          7/7/1926        Visit Information        Provider Department      5/15/2018 11:00 AM SH INFUSION CHAIR 20 [facility]        Today's Diagnoses     Acute hemorrhagic cystitis    -  1       Follow-ups after your visit        Your next 10 appointments already scheduled     May 16, 2018  9:30 AM CDT   Level 1 with SH INFUSION CHAIR 9   [facility] ([facility])    [facility]  [address]  [place] [address]   [phone]            May 16, 2018  2:15 PM CDT   Return Visit with Gretel Quinn MD   [facility] ([facility])    [facility]  [address]  [place] [address]   [phone]            May 17, 2018 12:30 PM CDT   Level 1 with  INFUSION CHAIR 4   [facility] ([facility])    [facility]  [address]  [place] [address]   [phone]            May 18, 2018 10:30 AM CDT   Level 1 with SH INFUSION CHAIR 15   [facility] ([facility])    [facility]  [address]  [place] [address]   [phone]            May 19, 2018 11:00 AM CDT   Level 1 with SH INFUSION CHAIR 3   [facility] ([facility])    [facility]  [address]  [place] [address]   [phone]            May 20, 2018 12:00 PM CDT   Level 1 with SH INFUSION CHAIR 8   [facility] ([facility])    [facility]  [address]  [place] [address]   [phone]            May 22, 2018 10:15 AM CDT   SHORT with Dickson Rodas  "MD Rachana   [facility] ([facility])    [address]  [facility] [address]   [phone]            May 23, 2018 10:00 AM CDT   Level 1 with SH INFUSION CHAIR 16   [facility] ([facility])    [facility]  [address]  [place] [address]   [phone]            May 30, 2018 10:00 AM CDT   Level 1 with SH INFUSION CHAIR 5   [facility] ([facility])    [facility]  [address]  [place] [address]   [phone]            Jun 06, 2018  9:00 AM CDT   Level 1 with SH INFUSION CHAIR 19   [facility] ([facility])    [facility]  [address]  [facility] [address]   [phone]              Who to contact     If you have questions or need follow up information about today's clinic visit or your schedule please contact [facility] directly at [phone].  Normal or non-critical lab and imaging results will be communicated to you by Forsevahart, letter or phone within 4 business days after the clinic has received the results. If you do not hear from us within 7 days, please contact the clinic through MyChart or phone. If you have a critical or abnormal lab result, we will notify you by phone as soon as possible.  Submit refill requests through Justworks or call your pharmacy and they will forward the refill request to us. Please allow 3 business days for your refill to be completed.          Additional Information About Your Visit        Justworks Information     Justworks lets you send messages to your doctor, view your test results, renew your prescriptions, schedule appointments and more. To sign up, go to [email or web address] . Click on \"Log in\" on the left " "side of the screen, which will take you to the Welcome page. Then click on \"Sign up Now\" on the right side of the page.     You will be asked to enter the access code listed below, as well as some personal information. Please follow the directions to create your username and password.     Your access code is: [email or web address]  Expires: 2018 11:49 AM     Your access code will  in 90 days. If you need help or a new code, please call your [facility] or [phone].        Care EveryWhere ID     This is your Care EveryWhere ID. This could be used by other organizations to access your [place] medical records  [id number]        Your Vitals Were     Pulse Temperature Respirations Pulse Oximetry          74 97.9  F (36.6  C) (Oral) 16 98%         Blood Pressure from Last 3 Encounters:   05/15/18 133/56   18 146/75   18 143/67    Weight from Last 3 Encounters:   18 90.7 kg (200 lb)   05/10/18 88.8 kg (195 lb 12.8 oz)   18 92 kg (202 lb 13.2 oz)              Today, you had the following     No orders found for display       Primary Care Provider Office Phone # Fax #    Dickson Reich [phone] [address] [facility] [address]        Goals        Lifestyle    increase socialization.      Notes - Note created  2018  3:19 PM by Maura Reveles RN    Goal Statement: I will meet my neighbors  Measure of Success: patient will meet 1 new person in his building each week.  Supportive Steps to Achieve: increase socialization.  Participate in scheduled group activities.  Barriers: pt states he likes to be alone.  Strengths: pt is willing to try  Date to Achieve By: 1 month          Equal Access to Services     ELIE GAGNON : Jeanne Driver, waemoryda lunealadaha, qaybta kaalmada paddy, melita rodriguez. So [facility] [phone].    ATENCIÓN: Si habla español, tiene a nolan disposición servicios gratuitos de asistencia " lingüística. Eric al [phone].    We comply with applicable federal civil rights laws and [place] laws. We do not discriminate on the basis of race, color, national origin, age, disability, sex, sexual orientation, or gender identity.            Thank you!     Thank you for choosing [facility]  for your care. Our goal is always to provide you with excellent care. Hearing back from our patients is one way we can continue to improve our services. Please take a few minutes to complete the written survey that you may receive in the mail after your visit with us. Thank you!             Your Updated Medication List - Protect others around you: Learn how to safely use, store and throw away your medicines at www.disposemymeds.org.          This list is accurate as of 5/15/18 11:11 AM.  Always use your most recent med list.                   Brand Name Dispense Instructions for use Diagnosis    ACYCLOVIR PO      Take 400 mg by mouth 2 times daily Oncology to decide when stop date will be        amLODIPine 10 MG tablet    NORVASC    30 tablet    Take 1 tablet (10 mg) by mouth daily    Essential hypertension       B-D U/F 31G X 8 MM   Generic drug:  insulin pen needle     500 each    USE 5 PEN NEEDLES PER DAY OR AS DIRECTED    Type 2 diabetes mellitus with stage 3 chronic kidney disease, with long-term current use of insulin (H)       Blood Glucose Monitoring Suppl Misc      3 times daily (before meals)        dexamethasone 4 MG tablet    DECADRON    30 tablet    Take 10 tablets (40 mg) by mouth on Days 1, 8, and 15.    Multiple myeloma not having achieved remission (H)       ertapenem 1 GM injection    INVanz    100 mL    Inject 1 g into the vein every 24 hours for 10 days CBC with differential, creatinine, SGOT weekly while on this medication to be faxed to Dr. Shaffer office.    Acute hemorrhagic cystitis       ferrous sulfate 325 (65 Fe) MG tablet    IRON     Take 325 mg by mouth  daily (with breakfast)        FREESTYLE LITE test strip   Generic drug:  blood glucose monitoring     400 strip    USE TO TEST FOUR TIMES DAILY    Uncontrolled type 1 diabetes mellitus with stage 3 chronic kidney disease (H)       furosemide 20 MG tablet    LASIX    30 tablet    TAKE 1 TABLET BY MOUTH EVERY DAY    Multiple myeloma not having achieved remission (H)       gemfibrozil 600 MG tablet    LOPID    180 tablet    TAKE 1 TABLET BY MOUTH TWICE DAILY    Hyperlipidemia       * INSULIN ASPART SC      Inject 13 Units Subcutaneous every morning        * INSULIN ASPART SC      Inject 11 Units Subcutaneous daily (before lunch)        * INSULIN ASPART SC      Inject Subcutaneous At Bedtime Inject 4 unit subcutaneously at bedtime for Diabetes II Ok to use Humalog insulin with same order details        * INSULIN ASPART SC      Inject Subcutaneous 4 times daily Sliding scale: 140-175 1 unit 176-210 2 units 211-245 3 units 246-280 4 units 281-315 5 units 316-350 6 units 351-385 7 units 386-420 8 units >420 9 units        * insulin aspart 100 UNITS/ML injection    NovoLOG     Inject 5 Units Subcutaneous daily (with dinner)    Type 2 diabetes mellitus with stage 3 chronic kidney disease, with long-term current use of insulin (H)       insulin detemir 100 UNIT/ML injection    LEVEMIR FLEXPEN/FLEXTOUCH    15 mL    Inject 7 Units Subcutaneous every morning (before breakfast)    Type 2 diabetes mellitus with diabetic chronic kidney disease, unspecified CKD stage, unspecified long term insulin use status (H), Uncontrolled type 2 diabetes mellitus with hyperglycemia, unspecified long term insulin use status (H), Type 2 diabetes mellitus with stage 3 chronic kidney disease, with long-term current use of insulin (H)       LISINOPRIL PO      Take 15 mg by mouth daily        LORazepam 0.5 MG tablet    ATIVAN    30 tablet    Take 1 tablet (0.5 mg) by mouth every 4 hours as needed (Anxiety, Nausea/Vomiting or Sleep)    Multiple myeloma  not having achieved remission (H)       metoprolol succinate 50 MG 24 hr tablet    TOPROL-XL    30 tablet    Take 1 tablet (50 mg) by mouth daily    Atrial fibrillation with rapid ventricular response (H)       polyethylene glycol Packet    MIRALAX/GLYCOLAX    7 packet    Take 17 g by mouth 2 times daily as needed (constipation)    Slow transit constipation       prochlorperazine 10 MG tablet    COMPAZINE    30 tablet    Take 1 tablet (10 mg) by mouth every 6 hours as needed (Nausea/Vomiting)    Multiple myeloma not having achieved remission (H)       Selenium 200 MCG Tabs      Take 100 mcg by mouth daily. Pt takes one half tab of the 200 mcg daily        * SYNTHROID PO      Take 50 mcg by mouth twice a week Monday and Friday        * LEVOTHYROXINE SODIUM PO      Take 25 mcg by mouth five times a week Tuesday, Wednesday, Thursday, Saturday, Sunday        VITAMIN C PO      Take 500 mg by mouth daily        VITAMIN D (CHOLECALCIFEROL) PO      Take 1,000 Units by mouth daily        * Notice:  This list has 7 medication(s) that are the same as other medications prescribed for you. Read the directions carefully, and ask your doctor or other care provider to review them with you.

## 2018-05-16 NOTE — MR AVS SNAPSHOT
After Visit Summary   5/16/2018    Roc Parkinson    MRN: [id number]           Patient Information     Date Of Birth          7/7/1926        Visit Information        Provider Department      5/16/2018 2:15 PM Gretel Quinn MD [facility]        Today's Diagnoses     MDS (myelodysplastic syndrome) (H)    -  1    Generalized muscle weakness          Care Instructions    1. Discontinue Velcade/dexamethasone.    2. Zometa every 12 weeks ( last 4/11/18- next due 7/4/18) CW    3. Continue Aranesp 300 mcg SQ every three weeks ( Last given 5/16/18- next due 6/6/18    4. Start Pomalidomide and dexamethasone. Chemo education today. Done by pharmacy- CW, RN    5.  See NP every 2 weeks. See me in 6 weeks      Addendum:  5/23/18: Spoke with Dr. Quinn about the frequency of MR. Parkinson's visits with the new change,   Instead of every 3 weeks , Dr. Quinn is ok with changing aranesp to every 2 weeks.  Jeanie Padgett RN OCN            Follow-ups after your visit        Additional Services     HOME CARE NURSING REFERRAL       **Order classes of: [facility], [facility] and [facility] will route to the Home Care and Hospice Referral Pool.  Home Care or Hospice will then contact the patient to schedule their appointment.**    If you do not hear from Home Care and Hospice, or you would like to call to schedule, please call the referring place of service that your provider has listed below.  ______________________________________________________________________    Your provider has referred you to: [facility]: [facility] [place] [phone]   [email or web address]    Extended Emergency Contact Information  Primary Emergency Contact: Liberty CASTREJON)   [place]  Mobile Phone: [phone]  Relation: Relative  Secondary Emergency Contact: Elfego Parkinson   [facility]  Home Phone: [phone]  Work Phone: NONE  Mobile Phone: NONE  Relation:  Brother  Preferred language: English    Patient Anticipated Discharge Date: 5/16/18   RN, PT, HHA to begin 24 - 48 hours after discharge.  PLEASE EVALUATE AND TREAT (Evaluation timeline is 24 - 48 hrs. Please call if there is need for a variance to this timeline).    REASON FOR REFERRAL: Assessment & Treatment: PT and OT    ADDITIONAL SERVICES NEEDED: OT    OTHER PERTINENT INFORMATION: Patient was last seen by provider on 5/16/18 for Recent hospital discharge, on IV antibiotics here at the clinic,   Expressed that he has leg weakness, trouble transferring out of bed, shower. Needs help with socks and shoes.    Current Outpatient Prescriptions:  ACYCLOVIR PO, Take 400 mg by mouth 2 times daily Oncology to decide when stop date will be, Disp: , Rfl:   amLODIPine (NORVASC) 10 MG tablet, Take 1 tablet (10 mg) by mouth daily, Disp: 30 tablet, Rfl:   Ascorbic Acid (VITAMIN C PO), Take 500 mg by mouth daily , Disp: , Rfl:   B-D U/F 31G X 8 MM insulin pen needle, USE 5 PEN NEEDLES PER DAY OR AS DIRECTED, Disp: 500 each, Rfl: 1  Blood Glucose Monitoring Suppl MISC, 3 times daily (before meals) , Disp: , Rfl:   ertapenem (INVANZ) 1 GM injection, Inject 1 g into the vein every 24 hours for 10 days CBC with differential, creatinine, SGOT weekly while on this medication to be faxed to Dr. Shaffer office., Disp: 100 mL, Rfl: 0  ferrous sulfate (IRON) 325 (65 FE) MG tablet, Take 325 mg by mouth daily (with breakfast), Disp: , Rfl:   FREESTYLE LITE test strip, USE TO TEST FOUR TIMES DAILY, Disp: 400 strip, Rfl: 0  furosemide (LASIX) 20 MG tablet, TAKE 1 TABLET BY MOUTH EVERY DAY, Disp: 30 tablet, Rfl: 0  gemfibrozil (LOPID) 600 MG tablet, TAKE 1 TABLET BY MOUTH TWICE DAILY, Disp: 180 tablet, Rfl: 1  insulin aspart (NOVOLOG) 100 UNITS/ML injection, Inject 5 Units Subcutaneous daily (with dinner), Disp: , Rfl:   INSULIN ASPART SC, Inject 13 Units Subcutaneous every morning , Disp: , Rfl:   INSULIN ASPART SC, Inject 11 Units  Subcutaneous daily (before lunch) , Disp: , Rfl:   INSULIN ASPART SC, Inject Subcutaneous At Bedtime Inject 4 unit subcutaneously at bedtime for Diabetes II Ok to use Humalog insulin with same order details, Disp: , Rfl:   INSULIN ASPART SC, Inject Subcutaneous 4 times daily Sliding scale:  140-175 1 unit  176-210 2 units  211-245 3 units  246-280 4 units  281-315 5 units  316-350 6 units  351-385 7 units  386-420 8 units  >420 9 units, Disp: , Rfl:   insulin detemir (LEVEMIR FLEXPEN/FLEXTOUCH) 100 UNIT/ML injection, Inject 7 Units Subcutaneous every morning (before breakfast), Disp: 15 mL, Rfl: 1  Levothyroxine Sodium (SYNTHROID PO), Take 50 mcg by mouth twice a week Monday and Friday, Disp: , Rfl:   LEVOTHYROXINE SODIUM PO, Take 25 mcg by mouth five times a week Tuesday, Wednesday, Thursday, Saturday, Sunday, Disp: , Rfl:   LISINOPRIL PO, Take 15 mg by mouth daily, Disp: , Rfl:   metoprolol succinate (TOPROL-XL) 50 MG 24 hr tablet, Take 1 tablet (50 mg) by mouth daily, Disp: 30 tablet, Rfl:   polyethylene glycol (MIRALAX/GLYCOLAX) Packet, Take 17 g by mouth 2 times daily as needed (constipation), Disp: 7 packet, Rfl:   Selenium 200 MCG TABS, Take 100 mcg by mouth daily. Pt takes one half tab of the 200 mcg daily , Disp: , Rfl:   VITAMIN D, CHOLECALCIFEROL, PO, Take 1,000 Units by mouth daily , Disp: , Rfl:   [DISCONTINUED] cyclophosphamide (CYTOXAN) 50 MG capsule CHEMO, Take 6 capsules (300 mg) by mouth once a week, Disp: 24 capsule, Rfl: 0  [DISCONTINUED] dexamethasone (DECADRON) 4 MG tablet, Take 10 tablets (40 mg) by mouth on Days 1, 8, and 15., Disp: 30 tablet, Rfl: 0      Patient Active Problem List:     Dysphagia     Malignant neoplasm of prostate (H)     Malignant neoplasm of bladder (H)     Essential hypertension, benign     Asymptomatic varicose veins     Congenital hiatus hernia     Oral lesion     CTS (carpal tunnel syndrome)     Irritable bowel syndrome     Vitamin D deficiency disease      Microalbuminuria     Obesity     UTI (urinary tract infection) w hx of recurrence     Iron deficiency anemia     Nonsmoker     A-fib (H)     Health Care Home     Hyperlipidemia     CKD (chronic kidney disease) stage 3, GFR 30-59 ml/min     Hypothyroidism     Long-term (current) use of anticoagulants [Z79.01]     Macrocytic anemia     GIB (gastrointestinal bleeding)     Multiple myeloma not having achieved remission (H)     Hypercalcemia     Hyperglycemia     Urinary tract infection     Hyperkalemia     ACP (advance care planning)     MDS (myelodysplastic syndrome) (H)     Chemotherapy-induced neutropenia (H)     Anemia in neoplastic disease     Type 2 diabetes mellitus with stage 3 chronic kidney disease, with long-term current use of insulin (H)     UTI due to extended-spectrum beta lactamase (ESBL) producing Escherichia coli     Essential hypertension     Physical deconditioning     Atrial fibrillation with RVR (H)     Hematuria     Acute hemorrhagic cystitis      Documentation of Face to Face and Certification for Home Health Services    I certify that patient, Roc Parkinson is under my care and that I, or a Nurse Practitioner or Physician's Assistant working with me, had a face-to-face encounter that meets the physician face-to-face encounter requirements with this patient on: .    This encounter with the patient was in whole, or in part, for the following medical condition, which is the primary reason for Home Health Care: .    I certify that, based on my findings, the following services are medically necessary Home Health Services: Occupational Therapy and Physical Therapy    My clinical findings support the need for the above services because: Physical Therapy Services are needed to assess and treat the following functional impairments: Leg weakness, trouble transferring.    Further, I certify that my clinical findings support that this patient is homebound (i.e. absences from home require considerable  and taxing effort and are for medical reasons or Denominational services or infrequently or of short duration when for other reasons) because:     Based on the above findings, I certify that this patient is confined to the home and needs intermittent skilled nursing care, physical therapy and/or speech therapy.  The patient is under my care, and I have initiated the establishment of the plan of care.  This patient will be followed by a physician who will periodically review the plan of care.    Physician/Provider to provide follow up care: Dickson Reich certified Physician at time of discharge:     Please be aware that coverage of these services is subject to the terms and limitations of your health insurance plan.  Call member services at your health plan with any benefit or coverage questions.                  Your next 10 appointments already scheduled     May 30, 2018 10:30 AM CDT   Level 4 with  INFUSION CHAIR 2   [facility] ([facility])    [facility]  [address]  [place] [address]   [phone]            Jun 06, 2018  9:00 AM CDT   Level 4 with  INFUSION CHAIR 19   [facility] ([facility])    [facility]  [address]  [place] [address]   [phone]            Jun 06, 2018  9:45 AM CDT   Return Visit with Gretel Quinn MD   [facility] ([facility])    [facility]  [address]  [place] [address]   [phone]            Jun 20, 2018  9:30 AM CDT   Return Visit with Kellee Meeks PA-C   [facility] [place] ([facility] [place])    [address]  [address]  [place] [address]   [phone]            Jun 20, 2018 11:00 AM CDT   Level 4 with  INFUSION CHAIR 17   [facility]  "[facility] ([facility])    [facility] [place]  [address]  [place] [address] [phone]              Who to contact     If you have questions or need follow up information about today's clinic visit or your schedule please contact [facility] [place] [facility] directly at [phone].  Normal or non-critical lab and imaging results will be communicated to you by MyChart, letter or phone within 4 business days after the clinic has received the results. If you do not hear from us within 7 days, please contact the clinic through TapBookAuthorhart or phone. If you have a critical or abnormal lab result, we will notify you by phone as soon as possible.  Submit refill requests through Storactive or call your pharmacy and they will forward the refill request to us. Please allow 3 business days for your refill to be completed.          Additional Information About Your Visit        TapBookAuthorharTapRush Information     Storactive lets you send messages to your doctor, view your test results, renew your prescriptions, schedule appointments and more. To sign up, go to [email or web address] . Click on \"Log in\" on the left side of the screen, which will take you to the Welcome page. Then click on \"Sign up Now\" on the right side of the page.     You will be asked to enter the access code listed below, as well as some personal information. Please follow the directions to create your username and password.     Your access code is: [email or web address]  Expires: 2018 11:49 AM     Your access code will  in 90 days. If you need help or a new code, please call your [facility] clinic or [phone].        Care EveryWhere ID     This is your Care EveryWhere ID. This could be used by other organizations to access your [facility] medical records  [id number]        Your Vitals Were     Pulse Temperature Respirations Pulse Oximetry BMI (Body Mass Index)       75 98.6  F (37  C) (Oral) 16 99% 29.89 kg/m2        Blood Pressure " from Last 3 Encounters:   No data found for BP    Weight from Last 3 Encounters:   No data found for Wt              Today, you had the following     No orders found for display         Today's Medication Changes          These changes are accurate as of 5/16/18 11:59 PM.  If you have any questions, ask your nurse or doctor.               Stop taking these medicines if you haven't already. Please contact your care team if you have questions.     dexamethasone 4 MG tablet   Commonly known as:  DECADRON   Stopped by:  Gretel Quinn MD           LORazepam 0.5 MG tablet   Commonly known as:  ATIVAN   Stopped by:  Gretel Quinn MD           prochlorperazine 10 MG tablet   Commonly known as:  COMPAZINE   Stopped by:  Gretel Quinn MD                    Primary Care Provider Office Phone # Fax #    Dickson Clark Reich [phone] [address] [facility] [address]        Goals        Lifestyle    increase socialization.      Notes - Note created  5/11/2018  3:19 PM by Maura Reveles RN    Goal Statement: I will meet my neighbors  Measure of Success: patient will meet 1 new person in his building each week.  Supportive Steps to Achieve: increase socialization.  Participate in scheduled group activities.  Barriers: pt states he likes to be alone.  Strengths: pt is willing to try  Date to Achieve By: 1 month          Equal Access to Services     ELIE GAGNON AH: Hadii virginia wilson hademelyo Soomaali, waaxda luqadaha, qaybta kaalmada adeegyada, melita rodriguez. So [facility] [phone].    ATENCIÓN: Si habla español, tiene a nolan disposición servicios gratuitos de asistencia lingüística. Llame al [phone].    We comply with applicable federal civil rights laws and [place] laws. We do not discriminate on the basis of race, color, national origin, age, disability, sex, sexual orientation, or gender identity.            Thank you!     Thank you for choosing [facility]  for  your care. Our goal is always to provide you with excellent care. Hearing back from our patients is one way we can continue to improve our services. Please take a few minutes to complete the written survey that you may receive in the mail after your visit with us. Thank you!             Your Updated Medication List - Protect others around you: Learn how to safely use, store and throw away your medicines at www.disposemymeds.org.          This list is accurate as of 5/16/18 11:59 PM.  Always use your most recent med list.                   Brand Name Dispense Instructions for use Diagnosis    ACYCLOVIR PO      Take 400 mg by mouth 2 times daily Oncology to decide when stop date will be        amLODIPine 10 MG tablet    NORVASC    30 tablet    Take 1 tablet (10 mg) by mouth daily    Essential hypertension       B-D U/F 31G X 8 MM   Generic drug:  insulin pen needle     500 each    USE 5 PEN NEEDLES PER DAY OR AS DIRECTED    Type 2 diabetes mellitus with stage 3 chronic kidney disease, with long-term current use of insulin (H)       Blood Glucose Monitoring Suppl Misc      3 times daily (before meals)        ertapenem 1 GM injection    INVanz    100 mL    Inject 1 g into the vein every 24 hours for 10 days CBC with differential, creatinine, SGOT weekly while on this medication to be faxed to Dr. Shaffer office.    Acute hemorrhagic cystitis       ferrous sulfate 325 (65 Fe) MG tablet    IRON     Take 325 mg by mouth daily (with breakfast)        furosemide 20 MG tablet    LASIX    30 tablet    TAKE 1 TABLET BY MOUTH EVERY DAY    Multiple myeloma not having achieved remission (H)       gemfibrozil 600 MG tablet    LOPID    180 tablet    TAKE 1 TABLET BY MOUTH TWICE DAILY    Hyperlipidemia       * INSULIN ASPART SC      Inject 13 Units Subcutaneous every morning        * INSULIN ASPART SC      Inject 11 Units Subcutaneous daily (before lunch)        * INSULIN ASPART SC      Inject Subcutaneous At Bedtime Inject 4 unit  subcutaneously at bedtime for Diabetes II Ok to use Humalog insulin with same order details        * INSULIN ASPART SC      Inject Subcutaneous 4 times daily Sliding scale: 140-175 1 unit 176-210 2 units 211-245 3 units 246-280 4 units 281-315 5 units 316-350 6 units 351-385 7 units 386-420 8 units >420 9 units        * insulin aspart 100 UNITS/ML injection    NovoLOG     Inject 5 Units Subcutaneous daily (with dinner)    Type 2 diabetes mellitus with stage 3 chronic kidney disease, with long-term current use of insulin (H)       insulin detemir 100 UNIT/ML injection    LEVEMIR FLEXPEN/FLEXTOUCH    15 mL    Inject 7 Units Subcutaneous every morning (before breakfast)    Type 2 diabetes mellitus with diabetic chronic kidney disease, unspecified CKD stage, unspecified long term insulin use status (H), Uncontrolled type 2 diabetes mellitus with hyperglycemia, unspecified long term insulin use status (H), Type 2 diabetes mellitus with stage 3 chronic kidney disease, with long-term current use of insulin (H)       LISINOPRIL PO      Take 15 mg by mouth daily        metoprolol succinate 50 MG 24 hr tablet    TOPROL-XL    30 tablet    Take 1 tablet (50 mg) by mouth daily    Atrial fibrillation with rapid ventricular response (H)       polyethylene glycol Packet    MIRALAX/GLYCOLAX    7 packet    Take 17 g by mouth 2 times daily as needed (constipation)    Slow transit constipation       Selenium 200 MCG Tabs      Take 100 mcg by mouth daily. Pt takes one half tab of the 200 mcg daily        * SYNTHROID PO      Take 50 mcg by mouth twice a week Monday and Friday        * LEVOTHYROXINE SODIUM PO      Take 25 mcg by mouth five times a week Tuesday, Wednesday, Thursday, Saturday, Sunday        VITAMIN C PO      Take 500 mg by mouth daily        VITAMIN D (CHOLECALCIFEROL) PO      Take 1,000 Units by mouth daily        * Notice:  This list has 7 medication(s) that are the same as other medications prescribed for you. Read the  directions carefully, and ask your doctor or other care provider to review them with you.

## 2018-05-16 NOTE — LETTER
"    5/16/2018         RE: Roc Parkinson  [address]   [facility] [address]        Dear Colleague,    Thank you for referring your patient, Roc Parkinson, to the [facility]. Please see a copy of my visit note below.    Oncology Rooming Note    May 16, 2018 1:57 PM   Roc Parkinson is a 89 year old male who presents for:    Chief Complaint   Patient presents with     Oncology Clinic Visit     MDS (myelodysplastic syndrome) (H)     Initial Vitals: /62 (BP Location: Left arm, Patient Position: Sitting, Cuff Size: Adult Regular)  Pulse 75  Temp 98.6  F (37  C) (Oral)  Resp 16  Wt 91.8 kg (202 lb 6.4 oz)  SpO2 99%  BMI 29.89 kg/m2 Estimated body mass index is 29.89 kg/(m^2) as calculated from the following:    Height as of 5/14/18: 1.753 m (5' 9\").    Weight as of this encounter: 91.8 kg (202 lb 6.4 oz). Body surface area is 2.11 meters squared.  No Pain (0) Comment: Data Unavailable   No LMP for male patient.  Allergies reviewed: Yes  Medications reviewed: Yes    Medications: Medication refills not needed today.  Pharmacy name entered into [employer]:    [facility] - [place], [place] - [address] DRUG STORE [address] [facility] [address] AT [address]    Clinical concerns: no   5 minutes for nursing intake (face to face time)          Winsome Diaz MA                [facility]ANS  HEMATOLOGY ONCOLOGY     DIAGNOSIS:    1- Kappa light chain IgM multiple myeloma in a 89-year-old patient.  Bone marrow biopsy on 11/17/2016 showed hypercellular bone marrow with 65% cellularity of light chain restricted plasma cells.  FISH or G-banding could not be performed due to insufficient sample.   There is a background of myelodysplastic syndrome.  The patient was initially seen in the hospital on 11/17/2016 for macrocytic anemia and pancytopenia and transfusion requirement and a bone marrow biopsy was performed. "   2- History of prostate cancer s/p EBRT s/p brachytherapy in 2003 (recent PSA 0.10), superficial bladder cancer ,no recurrences since 1986     TREATMENT: 12/15/16 velcade/dex. 4/12/17 added cyclophosphamide 300 mg weekly.6/28/17 we discontinued velcade due to concern for neuropathy and continued with weekly cyclophosphamide and dexamethasone.   1/2/18 switched to Carfilzomib and dexamethasone.   3/14/18 started on Daratumumab and dexamethasone.   4/11/18 started Velcade and dexamethasone     SUBJECTIVE:  The patient was seen as a followup today. He has been discharged from the hospital. He is deconditioned. He is completing his course of antibiotics.     REVIEW OF SYSTEMS:  A complete review of systems was performed and found to be negative other than pertinent positives mentioned in history of present illness.     Past medical, social histories reviewed.    Meds- Reviewed.     PHYSICAL EXAMINATION:   VITAL SIGNS:/62 (BP Location: Left arm, Patient Position: Sitting, Cuff Size: Adult Regular)  Pulse 75  Temp 98.6  F (37  C) (Oral)  Resp 16  Wt 91.8 kg (202 lb 6.4 oz)  SpO2 99%  BMI 29.89 kg/m2  CONSTITUTIONAL: Appears tired.   HEENT: Pupils are equal. Oropharynx is clear.   NECK: No cervical or supraclavicular lymphadenopathy.   RESPIRATORY: Clear bilaterally.   CARD/VASC: S1, S2, regular.   GI: Soft, nontender, nondistended, no hepatosplenomegaly.   MUSKULOSKELETAL: Warm, well perfused.   NEUROLOGIC: Alert, awake.   INTEGUMENT: No rash.   LYMPHATICS: No edema.   PSYCH: Mood and affect was normal.     LABORATORY DATA AND IMAGING REVIEWED DURING THIS VISIT:  Recent Labs   Lab Test  05/14/18   1645  05/11/18   1212  05/09/18   0645   03/26/18   0607  03/23/18   0532   01/21/18   0834  01/20/18   0220   NA  139   --   137   < >  141  135   < >  140  139   POTASSIUM  4.5   --   4.3   < >  3.6  4.2   < >  4.1  4.1   CHLORIDE  109   --   105   < >  108  106   < >  108  107   CO2  22   --   23   < >  25  20    < >  22  22   ANIONGAP  8   --   9   < >  8  9   < >  10  10   BUN  38*   --   27   < >  22  36*   < >  29  45*   CR  1.14  1.46*  0.97   < >  0.91  1.06   < >  1.02  1.45*   GLC  174*   --   106*   < >  134*  446*   < >  183*  75   MICHELLE  9.0   --   9.1   < >  8.7  8.0*   < >  8.8  9.1   MAG   --    --    --    --   2.0  1.8   < >   --   2.2   PHOS   --    --    --    --    --    --    --   2.9  2.2*    < > = values in this interval not displayed.     Recent Labs   Lab Test  05/16/18   1235  05/14/18   1645  05/14/18   1120   WBC  3.1*  3.6*  4.3   HGB  7.9*  8.1*  8.5*   PLT  111*  107*  108*   MCV  112*  111*  111*   NEUTROPHIL  58.1  62.5  62.7     Recent Labs   Lab Test  05/11/18   1212  05/02/18   1017  04/11/18   0806  04/05/18   0835   11/17/16   0938   BILITOTAL   --   0.4  0.5  0.5   < >   --    ALKPHOS   --   85  80  77   < >   --    ALT   --   13  11  12   < >   --    AST  13  12  13  7   < >   --    ALBUMIN   --   2.7*  2.6*  2.7*   < >   --    LDH   --    --    --    --    --   110    < > = values in this interval not displayed.     Results for JACOB MEDELLIN (MRN [id number]) as of 5/16/2018 16:20   Ref. Range 3/7/2018 11:30 4/11/2018 08:06 5/2/2018 10:17   Gamma Fraction Latest Ref Range: 0.7 - 1.6 g/dL 2.3 (H) 2.4 (H) 3.6 (H)   IGA Latest Ref Range: 70 - 380 mg/dL 12 (L) 13 (L) 8 (L)   IGG Latest Ref Range: 695 - 1620 mg/dL 252 (L) 261 (L) 241 (L)   IGM Latest Ref Range: 60 - 265 mg/dL 3420 (H) 3470 (H) 5850 (H)   Immunofixation ELP Unknown (Note) (Note) (Note)   Kappa Free Lt Chain Latest Ref Range: 0.33 - 1.94 mg/dL 72.00 (H) 110.25 (H) 265.00 (H)   Kappa Lambda Ratio Latest Ref Range: 0.26 - 1.65  248.28 (H) 125.28 (H) 913.79 (H)   Lambda Free Lt Chain Latest Ref Range: 0.57 - 2.63 mg/dL 0.29 (L) 0.88 0.29 (L)   Monoclonal Peak Latest Ref Range: 0.0 g/dL 2.0 (H) 2.0 (H) 3.3 (H)     ECOG PS: 2    ASSESSMENT:  This is a 89-year-old gentleman who has kappa light chain multiple myeloma with  hypercellular marrow with 65% of cells are light chain restricted plasma cells.  He had severe pancytopenia and has needed a transfusion in the hospital.  He did not have hypercalcemia and his renal function was normal. He has a background of myelodysplastic syndrome on his bone marrow biopsy; however, majority of the cell population was monoclonal plasma cell population.   He was started on treatment due to cytopenia.   In 4/2017 his light chain levels were getting worse. M spike was stable. We added cyclophosphamide to the regimen. In 6/2017 discontinued velcade for concern of development of neuropathy, in hindsight the pain appeared to be related to arthritis.       He was switched to Daratumumab and Dex on 3/14/18. The first dose resulted in reaction, he was admitted to the hospital with A fib and had complications/pneumonia. He was later started on Velcade/dexamthasone.     - Labs were reviewed with the patient, his M spike and light chains are worse and are consistent with disease progression. I discussed that we have to switch therapy. I asked his wishes about continuing vs stopping the treatment. He wishes to continue the treatment.   We discussed the option of pomalidomide at a low dose with dexamethasone. We had a detailed discussion about each chemotherapy agent, intent of treatment and also potential risks and benefits. We discussed the adverse effects related to each of chemotherapy agent.  Patient asked questions and expressed willingness to proceed with the treatment.     - Leg weakness is likely related to treatment related to myopathy and deconditioning. He I snot interested in physical therapy.     PLAN:   1. Discontinue Velcade/dexamethasone.  2. Zometa every 12 weeks  3. Continue Aranesp 300 mcg SQ every three weeks  4. Start Pomalidomide and dexamethasone. Chemo education today.   5- See NP every 2 weeks. See me in 6 weeks    ALISON JON MD    5/16/2018        Again, thank you for allowing me to  participate in the care of your patient.        Sincerely,        Gretel Quinn MD

## 2018-05-16 NOTE — PATIENT INSTRUCTIONS
1. Discontinue Velcade/dexamethasone.  2. Zometa every 12 weeks  3. Continue Aranesp 300 mcg SQ every three weeks  4. Start Pomalidomide and dexamethasone. Chemo education today.   5- See NP every 2 weeks. See me in 6 weeks      Electronically signed by Gretel Quinn MD at 5/16/2018  2:33 PM

## 2018-05-16 NOTE — PATIENT INSTRUCTIONS
1. Discontinue Velcade/dexamethasone.    2. Zometa every 12 weeks ( last 4/11/18- next due 7/4/18) CW    3. Continue Aranesp 300 mcg SQ every three weeks ( Last given 5/16/18- next due 6/6/18    4. Start Pomalidomide and dexamethasone. Chemo education today. Done by pharmacy- CW, RN    5.  See NP every 2 weeks. See me in 6 weeks      Addendum:  5/23/18: Spoke with Dr. Quinn about the frequency of MR. Parkinson's visits with the new change,   Instead of every 3 weeks , Dr. Quinn is ok with changing aranesp to every 2 weeks.  Jeanie Padgett RN OCN

## 2018-05-16 NOTE — PROGRESS NOTES
Clinic Care Coordination Contact    Clinic Care Coordination Contact  OUTREACH    Referral Information:  ED follow up  Patient seen in ED at [facility] after fall.  Discharged home to self care.  RN CC noted during chart review pt was complaining of feeling weak after infusion appointment.       Spoke with patient.  Roc states he fell while doing laundry in his apartment.  He wasn't able to get up so he yelled out and someone in building called EMS for him.  Pt states his legs still feel weak.  I offered to get some physical therapy for him and he declined.  He would prefer to finish up his infusion appointments first.  We discussed life line and how it could benefit him.  He said he was interested.  RN CC will mail information to him.      Chief Complaint   Patient presents with     Clinic Care Coordination - Follow-up       Universal Utilization:   Utilization    Last refreshed: 5/16/2018 12:38 AM:  No Show Count (past year) 0       Last refreshed: 5/16/2018 12:38 AM:  ED visits 1       Last refreshed: 5/16/2018 12:38 AM:  Hospital admissions 3          Current as of: 5/16/2018 12:38 AM           Transportation:          Goals:   Goals        Lifestyle    increase socialization.      Notes - Note created  5/11/2018  3:19 PM by Maura Reveles RN    Goal Statement: I will meet my neighbors  Measure of Success: patient will meet 1 new person in his building each week.  Supportive Steps to Achieve: increase socialization.  Participate in scheduled group activities.  Barriers: pt states he likes to be alone.  Strengths: pt is willing to try  Date to Achieve By: 1 month           Goal progression:  Pt states he has not been able to get out and meet new people yet.  He states he just has too many appointments right now.  RN CC will continue to encourage him toward goal.    RN CC will continue to monitor patient.  Will send out information on life line in mail and outreach again in 2 weeks.     Maura Reveles RN-BSN    Care Coordination  Phone:  [phone]  Email: [email or web address]

## 2018-05-16 NOTE — PROGRESS NOTES
[facility] PHYSICIANS  HEMATOLOGY ONCOLOGY     DIAGNOSIS:    1- Kappa light chain IgM multiple myeloma in a 89-year-old patient.  Bone marrow biopsy on 11/17/2016 showed hypercellular bone marrow with 65% cellularity of light chain restricted plasma cells.  FISH or G-banding could not be performed due to insufficient sample.   There is a background of myelodysplastic syndrome.  The patient was initially seen in the hospital on 11/17/2016 for macrocytic anemia and pancytopenia and transfusion requirement and a bone marrow biopsy was performed.   2- History of prostate cancer s/p EBRT s/p brachytherapy in 2003 (recent PSA 0.10), superficial bladder cancer ,no recurrences since 1986     TREATMENT: 12/15/16 velcade/dex. 4/12/17 added cyclophosphamide 300 mg weekly.6/28/17 we discontinued velcade due to concern for neuropathy and continued with weekly cyclophosphamide and dexamethasone.   1/2/18 switched to Carfilzomib and dexamethasone.   3/14/18 started on Daratumumab and dexamethasone.   4/11/18 started Velcade and dexamethasone     SUBJECTIVE:  The patient was seen as a followup today. He has been discharged from the hospital. He is deconditioned. He is completing his course of antibiotics.     REVIEW OF SYSTEMS:  A complete review of systems was performed and found to be negative other than pertinent positives mentioned in history of present illness.     Past medical, social histories reviewed.    Meds- Reviewed.     PHYSICAL EXAMINATION:   VITAL SIGNS:/62 (BP Location: Left arm, Patient Position: Sitting, Cuff Size: Adult Regular)  Pulse 75  Temp 98.6  F (37  C) (Oral)  Resp 16  Wt 91.8 kg (202 lb 6.4 oz)  SpO2 99%  BMI 29.89 kg/m2  CONSTITUTIONAL: Appears tired.   HEENT: Pupils are equal. Oropharynx is clear.   NECK: No cervical or supraclavicular lymphadenopathy.   RESPIRATORY: Clear bilaterally.   CARD/VASC: S1, S2, regular.   GI: Soft, nontender, nondistended, no hepatosplenomegaly.    MUSKULOSKELETAL: Warm, well perfused.   NEUROLOGIC: Alert, awake.   INTEGUMENT: No rash.   LYMPHATICS: No edema.   PSYCH: Mood and affect was normal.     LABORATORY DATA AND IMAGING REVIEWED DURING THIS VISIT:  Recent Labs   Lab Test  05/14/18   1645  05/11/18   1212  05/09/18   0645   03/26/18   0607  03/23/18   0532   01/21/18   0834  01/20/18   0220   NA  139   --   137   < >  141  135   < >  140  139   POTASSIUM  4.5   --   4.3   < >  3.6  4.2   < >  4.1  4.1   CHLORIDE  109   --   105   < >  108  106   < >  108  107   CO2  22   --   23   < >  25  20   < >  22  22   ANIONGAP  8   --   9   < >  8  9   < >  10  10   BUN  38*   --   27   < >  22  36*   < >  29  45*   CR  1.14  1.46*  0.97   < >  0.91  1.06   < >  1.02  1.45*   GLC  174*   --   106*   < >  134*  446*   < >  183*  75   MICHELLE  9.0   --   9.1   < >  8.7  8.0*   < >  8.8  9.1   MAG   --    --    --    --   2.0  1.8   < >   --   2.2   PHOS   --    --    --    --    --    --    --   2.9  2.2*    < > = values in this interval not displayed.     Recent Labs   Lab Test  05/16/18   1235  05/14/18   1645  05/14/18   1120   WBC  3.1*  3.6*  4.3   HGB  7.9*  8.1*  8.5*   PLT  111*  107*  108*   MCV  112*  111*  111*   NEUTROPHIL  58.1  62.5  62.7     Recent Labs   Lab Test  05/11/18   1212  05/02/18   1017  04/11/18   0806  04/05/18   0835   11/17/16   0938   BILITOTAL   --   0.4  0.5  0.5   < >   --    ALKPHOS   --   85  80  77   < >   --    ALT   --   13  11  12   < >   --    AST  13  12  13  7   < >   --    ALBUMIN   --   2.7*  2.6*  2.7*   < >   --    LDH   --    --    --    --    --   110    < > = values in this interval not displayed.     Results for JACOB MEDELLIN (MRN [id number]) as of 5/16/2018 16:20   Ref. Range 3/7/2018 11:30 4/11/2018 08:06 5/2/2018 10:17   Gamma Fraction Latest Ref Range: 0.7 - 1.6 g/dL 2.3 (H) 2.4 (H) 3.6 (H)   IGA Latest Ref Range: 70 - 380 mg/dL 12 (L) 13 (L) 8 (L)   IGG Latest Ref Range: 695 - 1620 mg/dL 252 (L) 261 (L) 241  (L)   IGM Latest Ref Range: 60 - 265 mg/dL 3420 (H) 3470 (H) 5850 (H)   Immunofixation ELP Unknown (Note) (Note) (Note)   Kappa Free Lt Chain Latest Ref Range: 0.33 - 1.94 mg/dL 72.00 (H) 110.25 (H) 265.00 (H)   Kappa Lambda Ratio Latest Ref Range: 0.26 - 1.65  248.28 (H) 125.28 (H) 913.79 (H)   Lambda Free Lt Chain Latest Ref Range: 0.57 - 2.63 mg/dL 0.29 (L) 0.88 0.29 (L)   Monoclonal Peak Latest Ref Range: 0.0 g/dL 2.0 (H) 2.0 (H) 3.3 (H)     ECOG PS: 2    ASSESSMENT:  This is a 89-year-old gentleman who has kappa light chain multiple myeloma with hypercellular marrow with 65% of cells are light chain restricted plasma cells.  He had severe pancytopenia and has needed a transfusion in the hospital.  He did not have hypercalcemia and his renal function was normal. He has a background of myelodysplastic syndrome on his bone marrow biopsy; however, majority of the cell population was monoclonal plasma cell population.   He was started on treatment due to cytopenia.   In 4/2017 his light chain levels were getting worse. M spike was stable. We added cyclophosphamide to the regimen. In 6/2017 discontinued velcade for concern of development of neuropathy, in hindsight the pain appeared to be related to arthritis.       He was switched to Daratumumab and Dex on 3/14/18. The first dose resulted in reaction, he was admitted to the hospital with A fib and had complications/pneumonia. He was later started on Velcade/dexamthasone.     - Labs were reviewed with the patient, his M spike and light chains are worse and are consistent with disease progression. I discussed that we have to switch therapy. I asked his wishes about continuing vs stopping the treatment. He wishes to continue the treatment.   We discussed the option of pomalidomide at a low dose with dexamethasone. We had a detailed discussion about each chemotherapy agent, intent of treatment and also potential risks and benefits. We discussed the adverse effects  related to each of chemotherapy agent.  Patient asked questions and expressed willingness to proceed with the treatment.     - Leg weakness is likely related to treatment related to myopathy and deconditioning. He I snot interested in physical therapy.     PLAN:   1. Discontinue Velcade/dexamethasone.  2. Zometa every 12 weeks  3. Continue Aranesp 300 mcg SQ every three weeks  4. Start Pomalidomide and dexamethasone. Chemo education today.   5- See NP every 2 weeks. See me in 6 weeks    ALISON JON MD    5/16/2018

## 2018-05-16 NOTE — PROGRESS NOTES
"Oncology Rooming Note    May 16, 2018 1:57 PM   Roc Parkinson is a 89 year old male who presents for:    Chief Complaint   Patient presents with     Oncology Clinic Visit     MDS (myelodysplastic syndrome) (H)     Initial Vitals: /62 (BP Location: Left arm, Patient Position: Sitting, Cuff Size: Adult Regular)  Pulse 75  Temp 98.6  F (37  C) (Oral)  Resp 16  Wt 91.8 kg (202 lb 6.4 oz)  SpO2 99%  BMI 29.89 kg/m2 Estimated body mass index is 29.89 kg/(m^2) as calculated from the following:    Height as of 5/14/18: 1.753 m (5' 9\").    Weight as of this encounter: 91.8 kg (202 lb 6.4 oz). Body surface area is 2.11 meters squared.  No Pain (0) Comment: Data Unavailable   No LMP for male patient.  Allergies reviewed: Yes  Medications reviewed: Yes    Medications: Medication refills not needed today.  Pharmacy name entered into [facility]:    [facility] [place], [place] - [address] DRUG STORE [address] [place] [address] - [address] AT [facility]    Clinical concerns: no   5 minutes for nursing intake (face to face time)          Winsome Diaz MA              "

## 2018-05-16 NOTE — PROGRESS NOTES
Oral Chemotherapy Monitoring Program    Primary Oncologist: Kai  Primary Oncology Clinic: [facility]  Cancer Diagnosis: Multiple Myeloma    Drug: Revlimid 5mg daily for 21 days then off for 7 days. (Dr Quinn had initially wanted to prescribe Pomalyst, but insurance wont cover Pomalyst unless the patient has tried Revlimid first).  Start Date: When available (in the next 1 week)  Dose is appropriate for patients: Renal Function   Expected duration of therapy: Until disease progression or unacceptable toxicity    Drug Interaction Assessment: None    Lab Monitoring Plan  C1D1+   CMP, CBC, C2D1+ Call, CMP, CBC, C3D1+ Call, CMP, CBC, C4D1+ Call, CMP, CBC, C5D1+ Call, CMP, CBC, C6D1+ Call, CMP, CBC,   C1D8+  C2D8+  C3D8+  C4D8+  C5D8+  C6D8+    C1D15+ Call, CBC, C2D15+ CBC C3D15+ CBC C4D15+  C5D15+  C6D15+    C1D22+  C2D22+  C3D22+  C4D22+  C5D22+  C6D22+        Subjective/Objective:  Rco Parkinson is a 89 year old male seen in clinic for an initial visit for oral chemotherapy education.     ORAL CHEMOTHERAPY 4/12/2017 5/10/2017 6/14/2017 8/16/2017 9/7/2017 10/18/2017 5/17/2018   Drug Name Cytoxan (Cyclophsphamide) Cytoxan (Cyclophsphamide) Cytoxan (Cyclophsphamide) Cytoxan (Cyclophsphamide) Cytoxan (Cyclophsphamide) Cytoxan (Cyclophsphamide) Revlimid (Lenalidomide)   Current Dosage 300mg 300mg 300mg 300mg 300mg 300mg 5mg   Current Schedule Weekly Weekly Weekly Weekly Weekly Weekly Daily   Cycle Details Continuous Continuous Continuous Continuous Continuous Continuous 3 weeks on 1 week off   Start Date of Last Cycle - 4/20/2017 6/5/2017 - - - -   Planned next cycle start date 4/19/2017 - 7/3/2017 - - 9/27/2017 -   Doses missed in last 2 weeks - 0 0 0 0 0 -   Adherence Assessment - Adherent Adherent Adherent Adherent Adherent -   Adverse Effects - No AE identified during assessment No AE identified during assessment No AE identified during assessment No AE identified during assessment No AE identified during assessment  "-   Home BPs - - - all BPs<140/90 - - -   Any new drug interactions? No No - No No - -   Is the dose as ordered appropriate for the patient? Yes Yes - Yes Yes - -   Is the patient currently in pain? - Assessed in last 30 days. - No Assessed in last 30 days. - -   Has the patient been assessed within the past 6 months for depression? - - - Yes Yes - -   Has the patient missed any days of school, work, or other routine activity? - - - No No - -       Last PHQ-2 Score on record:   PHQ-2 ( 1999 [employer]) 4/3/2018 2/20/2018   Q1: Little interest or pleasure in doing things 0 0   Q2: Feeling down, depressed or hopeless 0 0   PHQ-2 Score 0 0   Q1: Little interest or pleasure in doing things - -   Q2: Feeling down, depressed or hopeless - -   PHQ-2 Score - -             Vitals:  BP:   BP Readings from Last 1 Encounters:   05/17/18 117/64     Wt Readings from Last 1 Encounters:   05/16/18 91.8 kg (202 lb 6.4 oz)     Estimated body surface area is 2.11 meters squared as calculated from the following:    Height as of an earlier encounter on 5/16/18: 1.753 m (5' 9.02\").    Weight as of an earlier encounter on 5/16/18: 91.8 kg (202 lb 6.4 oz).      Labs:  _  Result Component Current Result Ref Range   Sodium 142 (5/16/2018) 133 - 144 mmol/L     _  Result Component Current Result Ref Range   Potassium 4.0 (5/16/2018) 3.4 - 5.3 mmol/L     _  Result Component Current Result Ref Range   Calcium 8.7 (5/16/2018) 8.5 - 10.1 mg/dL     No results found for Mag within last 30 days.     No results found for Phos within last 30 days.     _  Result Component Current Result Ref Range   Albumin 2.2 (L) (5/16/2018) 3.4 - 5.0 g/dL     _  Result Component Current Result Ref Range   Urea Nitrogen 32 (H) (5/16/2018) 7 - 30 mg/dL     _  Result Component Current Result Ref Range   Creatinine 1.18 (5/17/2018) 0.66 - 1.25 mg/dL       _  Result Component Current Result Ref Range   AST 18 (5/17/2018) 0 - 45 U/L     _  Result Component Current Result Ref " Range   ALT 15 (5/16/2018) 0 - 70 U/L     _  Result Component Current Result Ref Range   Bilirubin Total 0.2 (5/16/2018) 0.2 - 1.3 mg/dL       _  Result Component Current Result Ref Range   WBC 3.1 (L) (5/16/2018) 4.0 - 11.0 10e9/L     _  Result Component Current Result Ref Range   Hemoglobin 7.9 (L) (5/16/2018) 13.3 - 17.7 g/dL     _  Result Component Current Result Ref Range   Platelet Count 111 (L) (5/16/2018) 150 - 450 10e9/L     _  Result Component Current Result Ref Range   Absolute Neutrophil 1.8 (5/16/2018) 1.6 - 8.3 10e9/L     Assessment:  Patient is appropriate to start therapy. Baseline labs were done on 5/16/18. I talked to Dr Quinn and he will start Revlimid on a lower dosage of 5mg daily for 21 days then 7 days off given the patient's age. If the patient tolerates the 5mg dosage he might increase it to 10mg daily for 21 days then 7 days off in the future.    Plan:  Basic chemotherapy teaching was reviewed with the patient including indication, start date of therapy, dose, administration, adverse effects, missed doses, food and drug interactions, monitoring, side effect management, office contact information, and safe handling. Written materials were provided and all questions answered. I also called the patient's son Bam who lives in [place] and gave him the information as well. He had questions regarding what to expect in terms of side effects and I answered those questions as well. Bam plans to come to [place] to be with his Dad for the first 5 days or so when he starts the Revlimid.    Follow-Up:  3-5 days after the patient starts.     Alvarez GutierrezD.

## 2018-05-16 NOTE — MR AVS SNAPSHOT
After Visit Summary   5/16/2018    Roc Parkinson    MRN: [id number]           Patient Information     Date Of Birth          7/7/1926        Visit Information        Provider Department      5/16/2018 12:30 PM  INFUSION CHAIR 1 [facility]        Today's Diagnoses     Multiple myeloma not having achieved remission (H)    -  1    Acute hemorrhagic cystitis        MDS (myelodysplastic syndrome) (H)          Care Instructions    1. Discontinue Velcade/dexamethasone.  2. Zometa every 12 weeks  3. Continue Aranesp 300 mcg SQ every three weeks  4. Start Pomalidomide and dexamethasone. Chemo education today.   5- See NP every 2 weeks. See me in 6 weeks      Electronically signed by Gretel Quinn MD at 5/16/2018  2:33 PM               Follow-ups after your visit        Your next 10 appointments already scheduled     May 17, 2018 12:30 PM CDT   Level 1 with SH INFUSION CHAIR 4   [facility] ([facility])    [facility]  [address]  [place] [address]   [phone]            May 18, 2018 10:30 AM CDT   Level 1 with SH INFUSION CHAIR 15   [facility] ([facility])    [facility]  [address]  [place] [address]   [phone]            May 19, 2018 11:00 AM CDT   Level 1 with SH INFUSION CHAIR 3   [facility] ([facility])    [facility]  [address]  [place] [address]   [phone]            May 20, 2018 12:00 PM CDT   Level 1 with SH INFUSION CHAIR 8   [facility] ([facility])    [facility]  [address]  [place] [address]   [phone]            May 22, 2018 10:15 AM CDT   SHORT with Dickson Reich MD   [facility]  [place] ([facility])    [address]  [facility] [address]   [phone]            May 23, 2018 10:00 AM CDT   Level 1 with SH INFUSION CHAIR 16   [facility] ([facility])    [facility]  [address]  [place] [address]   [phone]            May 30, 2018 10:00 AM CDT   Level 1 with SH INFUSION CHAIR 5   [facility] ([facility])    [facility]  [address]  [place] [address]   [phone]            Jun 06, 2018  9:00 AM CDT   Level 1 with SH INFUSION CHAIR 19   [facility] ([facility])    [facility]  [address]  [place] [address]   [phone]            Jun 06, 2018  9:45 AM CDT   Return Visit with Gretel Quinn MD   [facility] ([facility])    [facility]  [address]  [place] [address]   [phone]            Jun 13, 2018 10:00 AM CDT   Level 1 with SH INFUSION CHAIR 11   [facility] ([facility])    [facility]  [address]  [place] [address]   [phone]              Who to contact     If you have questions or need follow up information about today's clinic visit or your schedule please contact [facility] directly at [phone].  Normal or non-critical lab and imaging results will be communicated to you by MyChart, letter or phone within 4 business days after the clinic has received the results. If you do not hear from us within 7 days, please contact the clinic through MyChart or phone. If you have a critical or abnormal lab result, we will notify you by phone as soon as possible.  Submit refill requests through  "Jennyt or call your pharmacy and they will forward the refill request to us. Please allow 3 business days for your refill to be completed.          Additional Information About Your Visit        MyChart Information     [facility] lets you send messages to your doctor, view your test results, renew your prescriptions, schedule appointments and more. To sign up, go to [email or web address] . Click on \"Log in\" on the left side of the screen, which will take you to the Welcome page. Then click on \"Sign up Now\" on the right side of the page.     You will be asked to enter the access code listed below, as well as some personal information. Please follow the directions to create your username and password.     Your access code is: [email or web address]  Expires: 2018 11:49 AM     Your access code will  in 90 days. If you need help or a new code, please call your [facility] clinic or [phone].        Care EveryWhere ID     This is your Care EveryWhere ID. This could be used by other organizations to access your [facility] medical records  [id number]         Blood Pressure from Last 3 Encounters:   18 127/62   05/15/18 133/56   18 146/75    Weight from Last 3 Encounters:   18 91.8 kg (202 lb 6.4 oz)   18 90.7 kg (200 lb)   05/10/18 88.8 kg (195 lb 12.8 oz)              We Performed the Following     CBC with platelets differential     Comprehensive metabolic panel          Today's Medication Changes          These changes are accurate as of 18  4:01 PM.  If you have any questions, ask your nurse or doctor.               Stop taking these medicines if you haven't already. Please contact your care team if you have questions.     dexamethasone 4 MG tablet   Commonly known as:  DECADRON   Stopped by:  Gretel Quinn MD           LORazepam 0.5 MG tablet   Commonly known as:  ATIVAN   Stopped by:  Gretel Quinn MD           prochlorperazine 10 MG tablet   Commonly known as:  COMPAZINE   Stopped by:  " Gretel Quinn MD                    Primary Care Provider Office Phone # Fax #    Dickson Clark Reich [phone]       [address] [facility] [address]        Goals        Lifestyle    increase socialization.      Notes - Note created  5/11/2018  3:19 PM by Maura Reveles RN    Goal Statement: I will meet my neighbors  Measure of Success: patient will meet 1 new person in his building each week.  Supportive Steps to Achieve: increase socialization.  Participate in scheduled group activities.  Barriers: pt states he likes to be alone.  Strengths: pt is willing to try  Date to Achieve By: 1 month          Equal Access to Services     ELIE GAGNON AH: Hadii virginia wilson hadasho Soomaali, waaxda luqadaha, qaybta kaalmada adeegyada, melita rodriguez. So [facility] [phone].    ATENCIÓN: Si habla español, tiene a nolan disposición servicios gratuitos de asistencia lingüística. Llame al [phone].    We comply with applicable federal civil rights laws and [place] laws. We do not discriminate on the basis of race, color, national origin, age, disability, sex, sexual orientation, or gender identity.            Thank you!     Thank you for choosing [facility] AND [facility]  for your care. Our goal is always to provide you with excellent care. Hearing back from our patients is one way we can continue to improve our services. Please take a few minutes to complete the written survey that you may receive in the mail after your visit with us. Thank you!             Your Updated Medication List - Protect others around you: Learn how to safely use, store and throw away your medicines at www.disposemymeds.org.          This list is accurate as of 5/16/18  4:01 PM.  Always use your most recent med list.                   Brand Name Dispense Instructions for use Diagnosis    ACYCLOVIR PO      Take 400 mg by mouth 2 times daily Oncology to decide when stop date will be         amLODIPine 10 MG tablet    NORVASC    30 tablet    Take 1 tablet (10 mg) by mouth daily    Essential hypertension       B-D U/F 31G X 8 MM   Generic drug:  insulin pen needle     500 each    USE 5 PEN NEEDLES PER DAY OR AS DIRECTED    Type 2 diabetes mellitus with stage 3 chronic kidney disease, with long-term current use of insulin (H)       Blood Glucose Monitoring Suppl Misc      3 times daily (before meals)        ertapenem 1 GM injection    INVanz    100 mL    Inject 1 g into the vein every 24 hours for 10 days CBC with differential, creatinine, SGOT weekly while on this medication to be faxed to Dr. Shaffer office.    Acute hemorrhagic cystitis       ferrous sulfate 325 (65 Fe) MG tablet    IRON     Take 325 mg by mouth daily (with breakfast)        FREESTYLE LITE test strip   Generic drug:  blood glucose monitoring     400 strip    USE TO TEST FOUR TIMES DAILY    Uncontrolled type 1 diabetes mellitus with stage 3 chronic kidney disease (H)       furosemide 20 MG tablet    LASIX    30 tablet    TAKE 1 TABLET BY MOUTH EVERY DAY    Multiple myeloma not having achieved remission (H)       gemfibrozil 600 MG tablet    LOPID    180 tablet    TAKE 1 TABLET BY MOUTH TWICE DAILY    Hyperlipidemia       * INSULIN ASPART SC      Inject 13 Units Subcutaneous every morning        * INSULIN ASPART SC      Inject 11 Units Subcutaneous daily (before lunch)        * INSULIN ASPART SC      Inject Subcutaneous At Bedtime Inject 4 unit subcutaneously at bedtime for Diabetes II Ok to use Humalog insulin with same order details        * INSULIN ASPART SC      Inject Subcutaneous 4 times daily Sliding scale: 140-175 1 unit 176-210 2 units 211-245 3 units 246-280 4 units 281-315 5 units 316-350 6 units 351-385 7 units 386-420 8 units >420 9 units        * insulin aspart 100 UNITS/ML injection    NovoLOG     Inject 5 Units Subcutaneous daily (with dinner)    Type 2 diabetes mellitus with stage 3 chronic kidney disease, with  long-term current use of insulin (H)       insulin detemir 100 UNIT/ML injection    LEVEMIR FLEXPEN/FLEXTOUCH    15 mL    Inject 7 Units Subcutaneous every morning (before breakfast)    Type 2 diabetes mellitus with diabetic chronic kidney disease, unspecified CKD stage, unspecified long term insulin use status (H), Uncontrolled type 2 diabetes mellitus with hyperglycemia, unspecified long term insulin use status (H), Type 2 diabetes mellitus with stage 3 chronic kidney disease, with long-term current use of insulin (H)       LISINOPRIL PO      Take 15 mg by mouth daily        metoprolol succinate 50 MG 24 hr tablet    TOPROL-XL    30 tablet    Take 1 tablet (50 mg) by mouth daily    Atrial fibrillation with rapid ventricular response (H)       polyethylene glycol Packet    MIRALAX/GLYCOLAX    7 packet    Take 17 g by mouth 2 times daily as needed (constipation)    Slow transit constipation       Selenium 200 MCG Tabs      Take 100 mcg by mouth daily. Pt takes one half tab of the 200 mcg daily        * SYNTHROID PO      Take 50 mcg by mouth twice a week Monday and Friday        * LEVOTHYROXINE SODIUM PO      Take 25 mcg by mouth five times a week Tuesday, Wednesday, Thursday, Saturday, Sunday        VITAMIN C PO      Take 500 mg by mouth daily        VITAMIN D (CHOLECALCIFEROL) PO      Take 1,000 Units by mouth daily        * Notice:  This list has 7 medication(s) that are the same as other medications prescribed for you. Read the directions carefully, and ask your doctor or other care provider to review them with you.

## 2018-05-16 NOTE — LETTER
[place] CARE COORDINATION  [address]  [facility][1] [id number]    May 16, 2018    Roc Parkinson  [address]   [facility][1] [address]      Dear Roc,    I am a clinic care coordinator who works with Dickson Reich MD at [facility]. I wanted to thank you for spending the time to talk with me.  I wanted to introduce myself and provide you with my contact information so that you can call me with questions or concerns about your health care. Below is a description of clinic care coordination and how I can further assist you.     The clinic care coordinator is a registered nurse and/or  who understand the health care system. The goal of clinic care coordination is to help you manage your health and improve access to the [facility] system in the most efficient manner. The registered nurse can assist you in meeting your health care goals by providing education, coordinating services, and strengthening the communication among your providers. The  can assist you with financial, behavioral, psychosocial, chemical dependency, counseling, and/or psychiatric resources.    Please feel free to contact me at [phone], with any questions or concerns. We at [facility] are focused on providing you with the highest-quality healthcare experience possible and that all starts with you.     Sincerely,     Maura Reveles RN-BSN   Care Coordination  Phone:  [phone]  Email: [email or web address]      Enclosed: I have enclosed a copy of the Complex Care Plan. This has helpful information and goals that we have talked about. Please keep this in an easy to access place to use as needed.

## 2018-05-16 NOTE — PROGRESS NOTES
Infusion Nursing Note:  Roc SILVA Parkinson presents today for invanz, poss aranesp; velcade.    Patient seen by provider today: Yes: Dr. Quinn   present during visit today: Not Applicable.    Note: Hold Velcade; give aranesp; draw CMP-see pt instructions dated today    Intravenous Access:  Midline.    Treatment Conditions:  Lab Results   Component Value Date    HGB 7.9 05/16/2018     Lab Results   Component Value Date    WBC 3.1 05/16/2018      Lab Results   Component Value Date    ANEU 1.8 05/16/2018     Lab Results   Component Value Date     05/16/2018      Results reviewed, labs MET treatment parameters, ok to proceed with treatment.      Post Infusion Assessment:  Patient tolerated injection without incident.  Site patent and intact, free from redness, edema or discomfort.    Discharge Plan:   Discharge instructions reviewed with: Patient and Alvarez, Pharm D & Patricia, Pharm liaison.  Patient and/or family verbalized understanding of discharge instructions and all questions answered.  Copy of AVS reviewed with patient and/or family.  Patient will return tomorrow for next appointment.  Patient discharged in stable condition accompanied by: self.  Departure Mode: Ambulatory with walker to front of [facility]-brother picking him up.    Doug Rios RN

## 2018-05-16 NOTE — MR AVS SNAPSHOT
After Visit Summary   5/16/2018    Roc Parkinson    MRN: [id number]           Patient Information     Date Of Birth          7/7/1926        Visit Information        Provider Department      5/16/2018 5:02 PM Larissa Obando LICSW [facility]        Today's Diagnoses     Counseling NOS(V65.40)    -  1       Follow-ups after your visit        Your next 10 appointments already scheduled     May 17, 2018 12:30 PM CDT   Level 1 with SH INFUSION CHAIR 4   [facility] ([facility])    [facility]  [address]  [place] [address]   [phone]            May 18, 2018 10:30 AM CDT   Level 1 with SH INFUSION CHAIR 15   [facility] ([facility])    [facility]  [address]  [place] [address]   [phone]            May 19, 2018 11:00 AM CDT   Level 1 with SH INFUSION CHAIR 3   [facility] ([facility])    [facility]  [address]  [place] [address]   [phone]            May 20, 2018 12:00 PM CDT   Level 1 with SH INFUSION CHAIR 8   [facility] ([facility])    [facility]  [address]  [place] [address]   [phone]            May 22, 2018 10:15 AM CDT   SHORT with Dickson Reich MD   [facility] ([facility]    [address] [place] [address]   [phone]            May 23, 2018 10:00 AM CDT   Level 1 with SH INFUSION CHAIR 16   [facility] ([facility])    [facility]  [address]  [place] [address]   [phone]            May 30, 2018 10:00 AM CDT   Level 1 with SH INFUSION  "CHAIR 5   [facility] ([facility] [place])    [facility]  [address]  [place] [address]   [phone]            Jun 06, 2018  9:00 AM CDT   Level 1 with SH INFUSION CHAIR 19   [facility] ([facility] [place])    [place] [address]  [place] [address]   [phone]            Jun 06, 2018  9:45 AM CDT   Return Visit with Gretel Quinn MD   [facility] ([facility] [place])    [facility]  [address]  [place] [address]   [phone]            Jun 13, 2018 10:00 AM CDT   Level 1 with SH INFUSION CHAIR 11   [facility] ([facility] [place])    [place] [address]  [place] [address]   [phone]              Who to contact     If you have questions or need follow up information about today's clinic visit or your schedule please contact [facility] directly at [phone].  Normal or non-critical lab and imaging results will be communicated to you by MyChart, letter or phone within 4 business days after the clinic has received the results. If you do not hear from us within 7 days, please contact the clinic through MyChart or phone. If you have a critical or abnormal lab result, we will notify you by phone as soon as possible.  Submit refill requests through [employer] or call your pharmacy and they will forward the refill request to us. Please allow 3 business days for your refill to be completed.          Additional Information About Your Visit        [employer] Information     [employer] lets you send messages to your doctor, view your test results, renew your prescriptions, schedule appointments and more. To sign up, go to [email or web address] . Click on \"Log in\" on the left side of the screen, which will take you to " "the Welcome page. Then click on \"Sign up Now\" on the right side of the page.     You will be asked to enter the access code listed below, as well as some personal information. Please follow the directions to create your username and password.     Your access code is: [email or web address]  Expires: 2018 11:49 AM     Your access code will  in 90 days. If you need help or a new code, please call your [facility] clinic or [phone].        Care EveryWhere ID     This is your Care EveryWhere ID. This could be used by other organizations to access your [facility] medical records  [id number]         Blood Pressure from Last 3 Encounters:   18 127/62   05/15/18 133/56   18 146/75    Weight from Last 3 Encounters:   18 91.8 kg (202 lb 6.4 oz)   18 91.8 kg (202 lb 6.4 oz)   18 90.7 kg (200 lb)              Today, you had the following     No orders found for display         Today's Medication Changes          These changes are accurate as of 18  5:24 PM.  If you have any questions, ask your nurse or doctor.               Stop taking these medicines if you haven't already. Please contact your care team if you have questions.     dexamethasone 4 MG tablet   Commonly known as:  DECADRON   Stopped by:  Gretel Quinn MD           LORazepam 0.5 MG tablet   Commonly known as:  ATIVAN   Stopped by:  Gretel Quinn MD           prochlorperazine 10 MG tablet   Commonly known as:  COMPAZINE   Stopped by:  Gretel Quinn MD                    Primary Care Provider Office Phone # Fax #    Dickson Clark Reich [phone]       [address] [facility] [address]        Goals        Lifestyle    increase socialization.      Notes - Note created  2018  3:19 PM by Maura Reveles RN    Goal Statement: I will meet my neighbors  Measure of Success: patient will meet 1 new person in his building each week.  Supportive Steps to Achieve: increase socialization.  Participate in scheduled " group activities.  Barriers: pt states he likes to be alone.  Strengths: pt is willing to try  Date to Achieve By: 1 month          Equal Access to Services     ELIE GAGNON : Hadii aad ku hademelyvincenzo Shivaniaimee, consuelo alexvanessaha, alyce thomason, melita carmelin hayaajanny lewsadie geigercarrillo rodriguezDevin So [facility] [phone].    ATENCIÓN: Si habla español, tiene a nolan disposición servicios gratuitos de asistencia lingüística. Llame al [phone].    We comply with applicable federal civil rights laws and [place] laws. We do not discriminate on the basis of race, color, national origin, age, disability, sex, sexual orientation, or gender identity.            Thank you!     Thank you for choosing [facility]  for your care. Our goal is always to provide you with excellent care. Hearing back from our patients is one way we can continue to improve our services. Please take a few minutes to complete the written survey that you may receive in the mail after your visit with us. Thank you!             Your Updated Medication List - Protect others around you: Learn how to safely use, store and throw away your medicines at www.disposemymeds.org.          This list is accurate as of 5/16/18  5:24 PM.  Always use your most recent med list.                   Brand Name Dispense Instructions for use Diagnosis    ACYCLOVIR PO      Take 400 mg by mouth 2 times daily Oncology to decide when stop date will be        amLODIPine 10 MG tablet    NORVASC    30 tablet    Take 1 tablet (10 mg) by mouth daily    Essential hypertension       B-D U/F 31G X 8 MM   Generic drug:  insulin pen needle     500 each    USE 5 PEN NEEDLES PER DAY OR AS DIRECTED    Type 2 diabetes mellitus with stage 3 chronic kidney disease, with long-term current use of insulin (H)       Blood Glucose Monitoring Suppl Misc      3 times daily (before meals)        ertapenem 1 GM injection    INVanz    100 mL    Inject 1 g into the vein every 24 hours for 10 days  CBC with differential, creatinine, SGOT weekly while on this medication to be faxed to Dr. Shaffer office.    Acute hemorrhagic cystitis       ferrous sulfate 325 (65 Fe) MG tablet    IRON     Take 325 mg by mouth daily (with breakfast)        FREESTYLE LITE test strip   Generic drug:  blood glucose monitoring     400 strip    USE TO TEST FOUR TIMES DAILY    Uncontrolled type 1 diabetes mellitus with stage 3 chronic kidney disease (H)       furosemide 20 MG tablet    LASIX    30 tablet    TAKE 1 TABLET BY MOUTH EVERY DAY    Multiple myeloma not having achieved remission (H)       gemfibrozil 600 MG tablet    LOPID    180 tablet    TAKE 1 TABLET BY MOUTH TWICE DAILY    Hyperlipidemia       * INSULIN ASPART SC      Inject 13 Units Subcutaneous every morning        * INSULIN ASPART SC      Inject 11 Units Subcutaneous daily (before lunch)        * INSULIN ASPART SC      Inject Subcutaneous At Bedtime Inject 4 unit subcutaneously at bedtime for Diabetes II Ok to use Humalog insulin with same order details        * INSULIN ASPART SC      Inject Subcutaneous 4 times daily Sliding scale: 140-175 1 unit 176-210 2 units 211-245 3 units 246-280 4 units 281-315 5 units 316-350 6 units 351-385 7 units 386-420 8 units >420 9 units        * insulin aspart 100 UNITS/ML injection    NovoLOG     Inject 5 Units Subcutaneous daily (with dinner)    Type 2 diabetes mellitus with stage 3 chronic kidney disease, with long-term current use of insulin (H)       insulin detemir 100 UNIT/ML injection    LEVEMIR FLEXPEN/FLEXTOUCH    15 mL    Inject 7 Units Subcutaneous every morning (before breakfast)    Type 2 diabetes mellitus with diabetic chronic kidney disease, unspecified CKD stage, unspecified long term insulin use status (H), Uncontrolled type 2 diabetes mellitus with hyperglycemia, unspecified long term insulin use status (H), Type 2 diabetes mellitus with stage 3 chronic kidney disease, with long-term current use of insulin (H)        LISINOPRIL PO      Take 15 mg by mouth daily        metoprolol succinate 50 MG 24 hr tablet    TOPROL-XL    30 tablet    Take 1 tablet (50 mg) by mouth daily    Atrial fibrillation with rapid ventricular response (H)       polyethylene glycol Packet    MIRALAX/GLYCOLAX    7 packet    Take 17 g by mouth 2 times daily as needed (constipation)    Slow transit constipation       Selenium 200 MCG Tabs      Take 100 mcg by mouth daily. Pt takes one half tab of the 200 mcg daily        * SYNTHROID PO      Take 50 mcg by mouth twice a week Monday and Friday        * LEVOTHYROXINE SODIUM PO      Take 25 mcg by mouth five times a week Tuesday, Wednesday, Thursday, Saturday, Sunday        VITAMIN C PO      Take 500 mg by mouth daily        VITAMIN D (CHOLECALCIFEROL) PO      Take 1,000 Units by mouth daily        * Notice:  This list has 7 medication(s) that are the same as other medications prescribed for you. Read the directions carefully, and ask your doctor or other care provider to review them with you.

## 2018-05-16 NOTE — LETTER
[facility]  Complex Care Plan  About Me  Patient Name:  Roc Medellin    YOB: 1926  Age:     89 year old   [place] MRN:   [id number] Telephone Information:    Home Phone [phone]   Mobile [phone]       Address:    [address]   [facility] [address] Email address:  No e-mail address on record      Emergency Contact(s)  Name Relationship Lgl Grd Work Phone Home Phone Mobile Phone   1. RENAE JORDANJOSE FT-PE* Relative    [phone]   2. BOB MEDELLIN Brother  NONE [phone] NONE   3. MARCELL MEDELLIN Son  [phone]   4.  ANAM MEDELLIN* Other    [phone]           Primary language:  English     needed? No   [employer]:  [phone] op. 1  Other communication barriers:    Preferred Method of Communication:  Mail  Current living arrangement:    Mobility Status/ Medical Equipment:      Health Maintenance  Health Maintenance Reviewed:      My Access Plan  Medical Emergency 911   Primary Clinic Line [facility] - [phone]   24 Hour Appointment Line [phone] or  [phone]) (toll-free)   24 Hour Nurse Line [phone] (toll-free)   Preferred Urgent Care     Preferred Hospital     Preferred Pharmacy [address]     Behavioral Health Crisis Line The National Suicide Prevention Lifeline at 1-128.874.4017 or 911     My Care Team Members    Patient Care Team       Relationship Specialty Notifications Start End    Dickson Reich MD PCP - General Family Practice  10/8/12     Phone: [phone] Fax: [phone]         [address] [facility] [address]    Gretel Quinn MD Hospitalist Oncology  2/9/18     Phone: [phone] Pager: [phone] Fax: [phone] [address] [facility] [place] [facility] [address]    Kalyn Almanzar RD Registered Dietitian Dietitian, Registered  2/9/18     Phone:  [phone] Fax: [phone]         [facility] [address] [facility] [address]    Maura Reveles, RN Lead Care Coordinator Primary Care - [facility]  5/10/18     Phone: [phone] Fax: [phone]                My Care Plans  Self Management and Treatment Plan  Goals and (Comments)  Goals        Lifestyle    increase socialization.      Notes - Note created  5/11/2018  3:19 PM by Maura Reveles, RN    Goal Statement: I will meet my neighbors  Measure of Success: patient will meet 1 new person in his building each week.  Supportive Steps to Achieve: increase socialization.  Participate in scheduled group activities.  Barriers: pt states he likes to be alone.  Strengths: pt is willing to try  Date to Achieve By: 1 month               Action Plans on File:                       Advance Care Plans/Directives Type:        My Medical and Care Information  Problem List   Patient Active Problem List   Diagnosis     Dysphagia     Malignant neoplasm of prostate (H)     Malignant neoplasm of bladder (H)     Essential hypertension, benign     Asymptomatic varicose veins     Congenital hiatus hernia     Oral lesion     CTS (carpal tunnel syndrome)     Irritable bowel syndrome     Vitamin D deficiency disease     Microalbuminuria     Obesity     UTI (urinary tract infection) w hx of recurrence     Iron deficiency anemia     Nonsmoker     A-fib (H)     Health Care Home     Hyperlipidemia     CKD (chronic kidney disease) stage 3, GFR 30-59 ml/min     Hypothyroidism     Long-term (current) use of anticoagulants [Z79.01]     Macrocytic anemia     GIB (gastrointestinal bleeding)     Multiple myeloma not having achieved remission (H)     Hypercalcemia     Hyperglycemia     Urinary tract infection     Hyperkalemia     ACP (advance care planning)     MDS (myelodysplastic syndrome) (H)     Chemotherapy-induced neutropenia (H)     Anemia in neoplastic disease     Type 2 diabetes mellitus with stage 3 chronic kidney disease,  with long-term current use of insulin (H)     UTI due to extended-spectrum beta lactamase (ESBL) producing Escherichia coli     Essential hypertension     Physical deconditioning     Atrial fibrillation with RVR (H)     Hematuria     Acute hemorrhagic cystitis      Current Medications and Allergies:  See printed Medication Report.    Care Coordination Start Date: No linked episodes   Frequency of Care Coordination:  2 weeks   Form Last Updated: 05/16/2018

## 2018-05-16 NOTE — PROGRESS NOTES
"Social Work Progress Note      Data/Intervention:  Patient Name:  Roc Parkinson  /Age:  1926 (89 year old)    Reason for Follow-Up:  Roc is a mary 89-year-old gentleman with multiple myeloma who comes to clinic today for invanz, verena chaidez, velcade, and scheduled outpatient visit with Dr. Quinn. This clinician met with pt for routine psychosocial check-in and support.     Intervention:   Roc reports today that he has been frustrated by cycle that seems to reoccur where pt is doing well at home then will \"need to go back to the hospital then stay at rehab for a week.\" Roc reports that he is struggling with multiple hospitalizations and has started to think more about whether he might need additional assistance at home.     Grooming: Pt reports that he is independent  Dressing: Pt reports that he is independent, however has noticed that it is increasingly difficult for him to tie shoes and to put on socks. Roc reports that he had an assistant device for this, but that he lost it when at rehab previously.   Bathing: Pt reports that he is able to bath himself but feels less comfortable on own. Recently pt removed chair from bathroom as it didn't fit in tub. Roc reports that he uses grab bars which are in bathroom.   Eating: Roc feeds self, receives meals from [facility] [employer] and is not cooking independently. Roc likes [facility] meals.   Bed Mobility: Roc reports that he has had some issues with getting out of bed, and reports that he had a fall from bed when attempting to transfer. Interested in learning more about assistant devices for bed.  Walking: Roc uses rolling walker to ambulate  Toileting: Independent    Roc reports that he wants to maintain residing independently at home for as long as he is able, but that he has worries about increasing fatigue, and leg weakness. Roc reports that he has exercises that he has learned from PTs at rehab, but that he worries " about his ability to continue to manage in home. Roc reports that he had a fall 5/14/18, and that he had 2 other falls 6 and 8 months ago. Discussed OT and PT for home safety evaluation and ongoing support regarding assistant devices for in home to support pt's independence and his ability to engage in IADLs and ADLs. Provided education to pt today regarding Lif[employer] services, which pt reported he was interested in. Provided general information about Elder Waiver and accessing through [facility] evaluation. Pt reported that he did not want to schedule evaluation at present time, but would be open to PT/OT evaluation and treatment.     Resources Provided:  [facility]  Homecare rehab services- for transferring assistance, equipment recommendations in home  [facility]    Plan:  1) RNCC to place homecare referral  2) This clinician will continue to follow Roc for his ongoing care needs, making community based referrals where needed. Will continue to support pt in thinking about alternative options for home support that promote independence and safety. Will follow-up with Roc in next 1 week to check-in regarding homecare support.     Please call or page if needs or concerns arise.     WESLEY Negro, LICSW  Direct Phone: [phone]  Pager: [phone]

## 2018-05-17 NOTE — TELEPHONE ENCOUNTER
"Social Work Progress Note      Data/Intervention:  Patient Name:  Roc Parkinson  /Age:  1926 (89 year old)    Reason for Follow-Up:  This clinician received call from son Bam (h:[phone], c: [phone]) requesting return call from pharmacy as early as possible.     Intervention:   Bam reported that he is aware that Roc is changing chemotherapy, receiving new treatment with pomalidomide and dexamethasone. Bam reports that he looked up information about medication yesterday, and was alarmed reading the side effects and the cost of the medication. Bam with concerns about pt's ability to tolerate the medication, and reports after he spoke with father yesterday evening, pt was \"panicked\" about cost. Bam reports that he would like to speak directly with pharmacy regarding effects and whether pt, as a 89-year-old, would be able to tolerate medication knowing that he has had recent falls and hospitalization.     Plan:  1) This clinician will ask pharmacy to follow-up with son regarding expected side effects and anticipated costs  2) This clinician would recommend that providers consider having one of Roc's son's on speaker phone when meeting with pt. Despite their living at a distance, they are very involved in providing care for pt.   3) This clinician will plan to follow-up with pt 18 for psychosocial check-in and support. Family has this clinician's contact information and knows to reach out in case of psychosocial distress or concern.     Please call or page if needs or concerns arise.     WESLEY Negro, [facility]  Direct Phone: [phone]  Pager: [phone]  "

## 2018-05-17 NOTE — MR AVS SNAPSHOT
After Visit Summary   5/17/2018    Roc Parkinson    MRN: [id number]           Patient Information     Date Of Birth          7/7/1926        Visit Information        Provider Department      5/17/2018 12:30 PM SH INFUSION CHAIR 4 [facility]        Today's Diagnoses     Acute hemorrhagic cystitis    -  1       Follow-ups after your visit        Your next 10 appointments already scheduled     May 18, 2018 10:30 AM CDT   Level 1 with SH INFUSION CHAIR 15   [facility] ([facility])    [facility]  [address]  [place] [address]   [phone]            May 19, 2018 11:00 AM CDT   Level 1 with SH INFUSION CHAIR 3   [facility] ([facility])    [facility]  [address]  [place] [address]   [phone]            May 20, 2018 12:00 PM CDT   Level 1 with SH INFUSION CHAIR 8   [facility] ([facility])    [facility]  [address]  [place] [address]   [phone]            May 22, 2018 10:15 AM CDT   SHORT with Dickson Reich MD   [facility]    [address] [place] [address]   [phone]            May 23, 2018 10:00 AM CDT   Level 1 with SH INFUSION CHAIR 16   [facility] ([facility])    [facility]  [address]  [place] [address]   [phone]            May 30, 2018 10:00 AM CDT   Level 1 with SH INFUSION CHAIR 5   [facility] ([facility])    [facility]  [address]  [place] [address]   [phone]            Jun 06, 2018  9:00 AM CDT   Level  "1 with SH INFUSION CHAIR 19   [facility] ([facility])    [facility]  [address]  [place] [address]   [phone]            Jun 06, 2018  9:45 AM CDT   Return Visit with Gretel Quinn MD   [facility] ([facility])    [place] [id number]  [place] [address]   [phone]            Jun 13, 2018 10:00 AM CDT   Level 1 with SH INFUSION CHAIR 11   [facility] ([facility])    [facility]  [address]  [place] [address]   [phone]            Jun 20, 2018 10:00 AM CDT   Level 1 with SH INFUSION CHAIR 6   [facility] ([facility])    [place] [address]  [place] [address]   [phone]              Who to contact     If you have questions or need follow up information about today's clinic visit or your schedule please contact [facility] directly at [phone].  Normal or non-critical lab and imaging results will be communicated to you by NexMedhart, letter or phone within 4 business days after the clinic has received the results. If you do not hear from us within 7 days, please contact the clinic through NexMedhart or phone. If you have a critical or abnormal lab result, we will notify you by phone as soon as possible.  Submit refill requests through Mercateo or call your pharmacy and they will forward the refill request to us. Please allow 3 business days for your refill to be completed.          Additional Information About Your Visit        Mercateo Information     Mercateo lets you send messages to your doctor, view your test results, renew your prescriptions, schedule appointments and more. To sign up, go to [email or web address] . Click on \"Log in\" on the left " "side of the screen, which will take you to the Welcome page. Then click on \"Sign up Now\" on the right side of the page.     You will be asked to enter the access code listed below, as well as some personal information. Please follow the directions to create your username and password.     Your access code is: [email or web address]  Expires: 2018 11:49 AM     Your access code will  in 90 days. If you need help or a new code, please call your [facility] clinic or [phone].        Care EveryWhere ID     This is your Care EveryWhere ID. This could be used by other organizations to access your [facility] medical records  [id number]        Your Vitals Were     Pulse Temperature Respirations             77 99.5  F (37.5  C) (Oral) 16          Blood Pressure from Last 3 Encounters:   18 117/64   18 127/62   05/15/18 133/56    Weight from Last 3 Encounters:   18 91.8 kg (202 lb 6.4 oz)   18 91.8 kg (202 lb 6.4 oz)   18 90.7 kg (200 lb)              We Performed the Following     AST     CBC with platelets differential     Creatinine        Primary Care Provider Office Phone # Fax #    Dickson Clark Reich [phone] [address] [address]        Goals        Lifestyle    increase socialization.      Notes - Note created  2018  3:19 PM by Maura Reveles RN    Goal Statement: I will meet my neighbors  Measure of Success: patient will meet 1 new person in his building each week.  Supportive Steps to Achieve: increase socialization.  Participate in scheduled group activities.  Barriers: pt states he likes to be alone.  Strengths: pt is willing to try  Date to Achieve By: 1 month          Equal Access to Services     ELIE GAGNON AH: Jeanne Driver, wadavid zamarripa, qaybta kaalmamelita gimenez. So [facility] [phone].    ATENCIÓN: Si habla español, tiene a nolan disposición servicios gratuitos de " asistencia lingüística. Eric al [phone].    We comply with applicable federal civil rights laws and [place] laws. We do not discriminate on the basis of race, color, national origin, age, disability, sex, sexual orientation, or gender identity.            Thank you!     Thank you for choosing [facility]  for your care. Our goal is always to provide you with excellent care. Hearing back from our patients is one way we can continue to improve our services. Please take a few minutes to complete the written survey that you may receive in the mail after your visit with us. Thank you!             Your Updated Medication List - Protect others around you: Learn how to safely use, store and throw away your medicines at www.disposemymeds.org.          This list is accurate as of 5/17/18 12:54 PM.  Always use your most recent med list.                   Brand Name Dispense Instructions for use Diagnosis    ACYCLOVIR PO      Take 400 mg by mouth 2 times daily Oncology to decide when stop date will be        amLODIPine 10 MG tablet    NORVASC    30 tablet    Take 1 tablet (10 mg) by mouth daily    Essential hypertension       B-D U/F 31G X 8 MM   Generic drug:  insulin pen needle     500 each    USE 5 PEN NEEDLES PER DAY OR AS DIRECTED    Type 2 diabetes mellitus with stage 3 chronic kidney disease, with long-term current use of insulin (H)       Blood Glucose Monitoring Suppl Misc      3 times daily (before meals)        ertapenem 1 GM injection    INVanz    100 mL    Inject 1 g into the vein every 24 hours for 10 days CBC with differential, creatinine, SGOT weekly while on this medication to be faxed to Dr. Shaffer office.    Acute hemorrhagic cystitis       ferrous sulfate 325 (65 Fe) MG tablet    IRON     Take 325 mg by mouth daily (with breakfast)        FREESTYLE LITE test strip   Generic drug:  blood glucose monitoring     400 strip    USE TO TEST FOUR TIMES DAILY    Uncontrolled  type 1 diabetes mellitus with stage 3 chronic kidney disease (H)       furosemide 20 MG tablet    LASIX    30 tablet    TAKE 1 TABLET BY MOUTH EVERY DAY    Multiple myeloma not having achieved remission (H)       gemfibrozil 600 MG tablet    LOPID    180 tablet    TAKE 1 TABLET BY MOUTH TWICE DAILY    Hyperlipidemia       * INSULIN ASPART SC      Inject 13 Units Subcutaneous every morning        * INSULIN ASPART SC      Inject 11 Units Subcutaneous daily (before lunch)        * INSULIN ASPART SC      Inject Subcutaneous At Bedtime Inject 4 unit subcutaneously at bedtime for Diabetes II Ok to use Humalog insulin with same order details        * INSULIN ASPART SC      Inject Subcutaneous 4 times daily Sliding scale: 140-175 1 unit 176-210 2 units 211-245 3 units 246-280 4 units 281-315 5 units 316-350 6 units 351-385 7 units 386-420 8 units >420 9 units        * insulin aspart 100 UNITS/ML injection    NovoLOG     Inject 5 Units Subcutaneous daily (with dinner)    Type 2 diabetes mellitus with stage 3 chronic kidney disease, with long-term current use of insulin (H)       insulin detemir 100 UNIT/ML injection    LEVEMIR FLEXPEN/FLEXTOUCH    15 mL    Inject 7 Units Subcutaneous every morning (before breakfast)    Type 2 diabetes mellitus with diabetic chronic kidney disease, unspecified CKD stage, unspecified long term insulin use status (H), Uncontrolled type 2 diabetes mellitus with hyperglycemia, unspecified long term insulin use status (H), Type 2 diabetes mellitus with stage 3 chronic kidney disease, with long-term current use of insulin (H)       LISINOPRIL PO      Take 15 mg by mouth daily        metoprolol succinate 50 MG 24 hr tablet    TOPROL-XL    30 tablet    Take 1 tablet (50 mg) by mouth daily    Atrial fibrillation with rapid ventricular response (H)       polyethylene glycol Packet    MIRALAX/GLYCOLAX    7 packet    Take 17 g by mouth 2 times daily as needed (constipation)    Slow transit constipation        Selenium 200 MCG Tabs      Take 100 mcg by mouth daily. Pt takes one half tab of the 200 mcg daily        * SYNTHROID PO      Take 50 mcg by mouth twice a week Monday and Friday        * LEVOTHYROXINE SODIUM PO      Take 25 mcg by mouth five times a week Tuesday, Wednesday, Thursday, Saturday, Sunday        VITAMIN C PO      Take 500 mg by mouth daily        VITAMIN D (CHOLECALCIFEROL) PO      Take 1,000 Units by mouth daily        * Notice:  This list has 7 medication(s) that are the same as other medications prescribed for you. Read the directions carefully, and ask your doctor or other care provider to review them with you.

## 2018-05-17 NOTE — TELEPHONE ENCOUNTER
Prior Authorization Approval    Authorization Effective Date: 2/16/2018  Authorization Expiration Date: 5/16/2021  Medication: Revlimid 5mg capsules  Approved Dose/Quantity: 21 capsules per 28 days  Reference #: Key: [id number] - [place] Case ID: [id number]   Insurance Company: [employer] - Phone [phone] Fax [phone]  Expected CoPay:     $2755  CoPay Card Available: not eligible  Foundation Assistance Needed: Yes, approved for [facility], $10,000  Which Pharmacy is filling the prescription (Not needed for infusion/clinic administered): [facility] - [place], [place] - [address]

## 2018-05-17 NOTE — PROGRESS NOTES
Infusion Nursing Note:  Roc Parkinson presents today for Invanz.  Patient seen by provider today: No   present during visit today: Not Applicable.    Note: labs drawn and dressing changed    Intravenous Access:  Midline.    Treatment Conditions:  Not Applicable.      Post Infusion Assessment:  Patient tolerated infusion without incident.  Site patent and intact, free from redness, edema or discomfort.  No evidence of extravasations.    Discharge Plan:   Patient and/or family verbalized understanding of discharge instructions and all questions answered.  Copy of AVS reviewed with patient and/or family.  Patient will return tomorrow for next appointment.  Patient discharged in stable condition accompanied by: self.  Departure Mode: Ambulatory.    Fernanda Bailey RN

## 2018-05-17 NOTE — TELEPHONE ENCOUNTER
Patient has been approved for [employer] with [employer] copay relief foundation for $10,000 to use towards the copayments for Revlimid.

## 2018-05-17 NOTE — TELEPHONE ENCOUNTER
Prior Authorization Specialty Medication Request--MARKED AS URGENT    Medication/Dose: Revlimid 5mg capsules  Rationale: see questions answered on covermymeds.com, Key: [id number] [place] Case ID: [id number].    Insurance Name: [employer]  Insurance ID: [id number]  Insurance Phone Number: [phone]

## 2018-05-17 NOTE — TELEPHONE ENCOUNTER
PRIOR AUTHORIZATION DENIED    Medication: Pomalyst 2mg capsules    Denial Date: 5/17/2018    Denial Rational: Must have tried at least one immunomodulatory agent (ie. thalidomide, lenalidomide). See denial letter below.    Appeal Information: Submit an appeal to [employer] at:     [employer] Exceptions   [address] [address],   [place] [address]  Fax Number: [phone]        PA Initiation    Medication: Pomalyst 2mg capsules  Reference #: Key: [id number] - PA Case ID: [id number]   Insurance Company: [employer] - Phone [phone] Fax [phone]    Start Date: 5/16/2018    Questions asked on covermymeds.com,

## 2018-05-18 NOTE — PROGRESS NOTES
Infusion Nursing Note:  Roc Parkinson presents today for invanz and 1 unit prbc.    Patient seen by provider today: No   present during visit today: Not Applicable.    Note: Revlimid teaching today done by Armond.    Intravenous Access:  Midline.    Treatment Conditions:  Lab Results   Component Value Date    HGB 7.9 05/16/2018     Lab Results   Component Value Date    WBC 3.1 05/16/2018      Lab Results   Component Value Date    ANEU 1.8 05/16/2018     Lab Results   Component Value Date     05/16/2018      Blood transfusion consent signed 5/17/18      Post Infusion Assessment:  Patient tolerated infusion without incident.  Blood return noted pre and post infusion.  Site patent and intact, free from redness, edema or discomfort.  No evidence of extravasations.    Discharge Plan:   Discharge instructions reviewed with: Patient.  Patient and/or family verbalized understanding of discharge instructions and all questions answered.  AVS to patient via Golimi[employer].  Patient will return 5/19 for next appointment.   Patient discharged in stable condition accompanied by: self.  Departure Mode: Ambulatory with walker.    Alfa Morgan RN

## 2018-05-18 NOTE — MR AVS SNAPSHOT
After Visit Summary   5/18/2018    Roc Parkinson    MRN: [id number]           Patient Information     Date Of Birth          7/7/1926        Visit Information        Provider Department      5/18/2018 10:30 AM SH INFUSION CHAIR 15 [facility]        Today's Diagnoses     MDS (myelodysplastic syndrome) (H)    -  1    Macrocytic anemia        Acute hemorrhagic cystitis           Follow-ups after your visit        Follow-up notes from your care team     Return in 1 day (on 5/19/2018).      Your next 10 appointments already scheduled     May 19, 2018 11:00 AM CDT   Level 1 with SH INFUSION CHAIR 3   [facility] ([facility])    [facility]  [address]  [place] [address]   [phone]            May 20, 2018 12:00 PM CDT   Level 1 with SH INFUSION CHAIR 8   [facility] ([facility])    [facility]  [address]  [place] [address]   [phone]            May 22, 2018 10:15 AM CDT   SHORT with Dickson Reich MD   [facility]    [address] [place] [address]   [phone]            May 30, 2018 10:30 AM CDT   Level 4 with SH INFUSION CHAIR 2   [facility] ([facility])    [facility]  [address]  [place] [address]   [phone]            Jun 06, 2018  9:00 AM CDT   Level 4 with SH INFUSION CHAIR 19   [facility] ([facility])    [facility]  [address]  [place] [address]   [phone]            Jun 06, 2018  9:45 AM CDT   Return Visit with Gretel Quinn MD   [facility] ([facility])    [facility]  "[facility]  [address]  [place] [address] [phone]            Jun 20, 2018  9:30 AM CDT   Return Visit with Kellee Meeks PA-C   [facility] [place] ([facility] [place])    [address]  [address]  [place] [id number] [phone]              Future tests that were ordered for you today     Open Standing Orders        Priority Remaining Interval Expires Ordered    Transfuse red blood cell unit Routine 99/100 TRANSFUSE 1 UNIT  5/18/2018    Red blood cell prepare order unit Routine 99/100 CONDITIONAL (SPECIFY) BLOOD  5/17/2018          Open Future Orders        Priority Expected Expires Ordered    CBC with platelets differential Routine  5/16/2019 5/17/2018    Comprehensive metabolic panel Routine  5/16/2019 5/17/2018            Who to contact     If you have questions or need follow up information about today's clinic visit or your schedule please contact [facility] directly at [phone].  Normal or non-critical lab and imaging results will be communicated to you by Instant Informationhart, letter or phone within 4 business days after the clinic has received the results. If you do not hear from us within 7 days, please contact the clinic through Zufflet or phone. If you have a critical or abnormal lab result, we will notify you by phone as soon as possible.  Submit refill requests through skyrockit or call your pharmacy and they will forward the refill request to us. Please allow 3 business days for your refill to be completed.          Additional Information About Your Visit        Instant Informationhart Information     skyrockit lets you send messages to your doctor, view your test results, renew your prescriptions, schedule appointments and more. To sign up, go to [email or web address] . Click on \"Log in\" on the left side of the screen, which will take you to the Welcome page. Then click on \"Sign up Now\" on the right side of the " page.     You will be asked to enter the access code listed below, as well as some personal information. Please follow the directions to create your username and password.     Your access code is: [email or web address]  Expires: 2018 11:49 AM     Your access code will  in 90 days. If you need help or a new code, please call your [place] clinic or [phone].        Care EveryWhere ID     This is your Care EveryWhere ID. This could be used by other organizations to access your [place] medical records  [id number]        Your Vitals Were     Pulse Temperature Respirations Pulse Oximetry          70 99.3  F (37.4  C) 16 98%         Blood Pressure from Last 3 Encounters:   18 126/70   18 117/64   18 127/62    Weight from Last 3 Encounters:   18 91.8 kg (202 lb 6.4 oz)   18 91.8 kg (202 lb 6.4 oz)   18 90.7 kg (200 lb)              We Performed the Following     ABO/Rh type and screen     Blood component     Transfuse red blood cell unit        Primary Care Provider Office Phone # Fax #    Dickson Clark Reich [phone]       [address] [facility] [address]        Goals        Lifestyle    increase socialization.      Notes - Note created  2018  3:19 PM by Maura Reveles RN    Goal Statement: I will meet my neighbors  Measure of Success: patient will meet 1 new person in his building each week.  Supportive Steps to Achieve: increase socialization.  Participate in scheduled group activities.  Barriers: pt states he likes to be alone.  Strengths: pt is willing to try  Date to Achieve By: 1 month          Equal Access to Services     ELIE GAGNON AH: Hadii [place] Driver, waaxda luqadaha, qaybta kaalmada sureshda, melita rodriguez. So [facility] [phone].    ATENCIÓN: Si habla español, tiene a nolan disposición servicios gratuitos de asistencia lingüística. Llame al [phone].    We comply with applicable federal  civil rights laws and [place] laws. We do not discriminate on the basis of race, color, national origin, age, disability, sex, sexual orientation, or gender identity.            Thank you!     Thank you for choosing [facility]  for your care. Our goal is always to provide you with excellent care. Hearing back from our patients is one way we can continue to improve our services. Please take a few minutes to complete the written survey that you may receive in the mail after your visit with us. Thank you!             Your Updated Medication List - Protect others around you: Learn how to safely use, store and throw away your medicines at www.disposemymeds.org.          This list is accurate as of 5/18/18  4:38 PM.  Always use your most recent med list.                   Brand Name Dispense Instructions for use Diagnosis    ACYCLOVIR PO      Take 400 mg by mouth 2 times daily Oncology to decide when stop date will be        amLODIPine 10 MG tablet    NORVASC    30 tablet    Take 1 tablet (10 mg) by mouth daily    Essential hypertension       aspirin 325 MG tablet     30 tablet    Take 1 tablet (325 mg) by mouth daily    Multiple myeloma not having achieved remission (H)       B-D U/F 31G X 8 MM   Generic drug:  insulin pen needle     500 each    USE 5 PEN NEEDLES PER DAY OR AS DIRECTED    Type 2 diabetes mellitus with stage 3 chronic kidney disease, with long-term current use of insulin (H)       Blood Glucose Monitoring Suppl Misc      3 times daily (before meals)        dexamethasone 4 MG tablet    DECADRON    40 tablet    Take 10 tablets (40 mg) by mouth once a week Once a week with food on Days 1,8,15 and 22.    Multiple myeloma not having achieved remission (H)       ertapenem 1 GM injection    INVanz    100 mL    Inject 1 g into the vein every 24 hours for 10 days CBC with differential, creatinine, SGOT weekly while on this medication to be faxed to Dr. Shaffer office.    Acute  hemorrhagic cystitis       ferrous sulfate 325 (65 Fe) MG tablet    IRON     Take 325 mg by mouth daily (with breakfast)        FREESTYLE LITE test strip   Generic drug:  blood glucose monitoring     400 strip    USE TO TEST FOUR TIMES DAILY    Uncontrolled type 1 diabetes mellitus with stage 3 chronic kidney disease (H)       furosemide 20 MG tablet    LASIX    30 tablet    TAKE 1 TABLET BY MOUTH EVERY DAY    Multiple myeloma not having achieved remission (H)       gemfibrozil 600 MG tablet    LOPID    180 tablet    TAKE 1 TABLET BY MOUTH TWICE DAILY    Hyperlipidemia       * INSULIN ASPART SC      Inject 13 Units Subcutaneous every morning        * INSULIN ASPART SC      Inject 11 Units Subcutaneous daily (before lunch)        * INSULIN ASPART SC      Inject Subcutaneous At Bedtime Inject 4 unit subcutaneously at bedtime for Diabetes II Ok to use Humalog insulin with same order details        * INSULIN ASPART SC      Inject Subcutaneous 4 times daily Sliding scale: 140-175 1 unit 176-210 2 units 211-245 3 units 246-280 4 units 281-315 5 units 316-350 6 units 351-385 7 units 386-420 8 units >420 9 units        * insulin aspart 100 UNITS/ML injection    NovoLOG     Inject 5 Units Subcutaneous daily (with dinner)    Type 2 diabetes mellitus with stage 3 chronic kidney disease, with long-term current use of insulin (H)       insulin detemir 100 UNIT/ML injection    LEVEMIR FLEXPEN/FLEXTOUCH    15 mL    Inject 7 Units Subcutaneous every morning (before breakfast)    Type 2 diabetes mellitus with diabetic chronic kidney disease, unspecified CKD stage, unspecified long term insulin use status (H), Uncontrolled type 2 diabetes mellitus with hyperglycemia, unspecified long term insulin use status (H), Type 2 diabetes mellitus with stage 3 chronic kidney disease, with long-term current use of insulin (H)       LENalidomide 5 MG Caps capsule CHEMO    REVLIMID    21 capsule    Take 1 capsule (5 mg) by mouth daily for 21 days  Take on Days 1 through 21 of 28-day cycle.    Multiple myeloma not having achieved remission (H)       LISINOPRIL PO      Take 15 mg by mouth daily        LORazepam 0.5 MG tablet    ATIVAN    30 tablet    Take 1 tablet (0.5 mg) by mouth every 4 hours as needed (Anxiety, Nausea/Vomiting or Sleep)    Multiple myeloma not having achieved remission (H)       metoprolol succinate 50 MG 24 hr tablet    TOPROL-XL    30 tablet    Take 1 tablet (50 mg) by mouth daily    Atrial fibrillation with rapid ventricular response (H)       polyethylene glycol Packet    MIRALAX/GLYCOLAX    7 packet    Take 17 g by mouth 2 times daily as needed (constipation)    Slow transit constipation       prochlorperazine 5 MG tablet    COMPAZINE    30 tablet    Take 1 tablet (5 mg) by mouth every 6 hours as needed (Nausea/Vomiting)    Multiple myeloma not having achieved remission (H)       Selenium 200 MCG Tabs      Take 100 mcg by mouth daily. Pt takes one half tab of the 200 mcg daily        * SYNTHROID PO      Take 50 mcg by mouth twice a week Monday and Friday        * LEVOTHYROXINE SODIUM PO      Take 25 mcg by mouth five times a week Tuesday, Wednesday, Thursday, Saturday, Sunday        VITAMIN C PO      Take 500 mg by mouth daily        VITAMIN D (CHOLECALCIFEROL) PO      Take 1,000 Units by mouth daily        * Notice:  This list has 7 medication(s) that are the same as other medications prescribed for you. Read the directions carefully, and ask your doctor or other care provider to review them with you.

## 2018-05-18 NOTE — PROGRESS NOTES
Oral Chemotherapy Monitoring Program.    Patient is scheduled to start Revlimid therapy next week on Monday. I met with the patient during today's infusion appointment to reiterate the instructions.    I reviewed the instructions to take Revlimid and Dexamethasone and the patient was able to verbalize understanding through the teach back method. We also gave the patient a calendar that shows when to take Revlimid and when to take Dexamethasone.    The patient is expected to start Revlimid on Monday 5/21/18.      Will follow up in about 3-4 days after starting.    Alvarez GutierrezD  Oral Chemotherapy Program

## 2018-05-18 NOTE — PROGRESS NOTES
Social Work Progress Note      Data/Intervention:  Patient Name:  Roc Parkinson  /Age:  1926 (89 year old)    Reason for Follow-Up:  Roc comes to clinic today for invanz and 1 unit PRB, this clinician met with pt for planned psychosocial check-in and support. This clinician also received call from pt's son Bam this morning, requesting SW visit today.     Intervention:   Roc reports that his twin brother drove him to clinic today as he has been increasingly concerned about his leg weakness. Roc continues to be open to PT/OT home evaluation and treatment. Brief discussion with pt today about alternative options for driving. Provided education to pt today regarding [employer], as well as updated transportation list of local area. Roc reported that he has thought about applying for [employer] in the past, but that he is not interested in completing application at this time. Roc reported that he will continue to think about options, and is open to further discussion with this clinician regarding transportation in future.     Pt's son Bam plans to come to visit Roc -, another son plans to visit pt - for additional care and support as needed. Family aware of this clinician's availability and knows to reach out in the case of psychosocial distress or need.     Resources Provided:  [employer]  Transportation list    Plan:  Previously provided patient/family with writer's contact information and availability. This clinician will continue to follow pt for ongoing psychosocial support as pt adjusts to new treatment and impacts on daily life. RNCC to contact  Home care to inquire about start date of services. Pt expecting their call.     Please call or page if needs or concerns arise.     WESLEY Negro, [facility]  Direct Phone: [phone]  Pager: [phone]

## 2018-05-18 NOTE — MR AVS SNAPSHOT
After Visit Summary   5/18/2018    Roc Parkinson    MRN: [id number]           Patient Information     Date Of Birth          7/7/1926        Visit Information        Provider Department      5/18/2018 11:11 AM Larissa Obando LICSW [facility] [place]        Today's Diagnoses     Counseling NOS(V65.40)    -  1       Follow-ups after your visit        Your next 10 appointments already scheduled     May 19, 2018 11:00 AM CDT   Level 1 with SH INFUSION CHAIR 3   [facility] [place] [facility] ([facility])    [facility]  [address]  [place] [address]   [phone]            May 20, 2018 12:00 PM CDT   Level 1 with SH INFUSION CHAIR 8   [facility] [place] [facility] ([facility])    [facility]  [address]  [place] [address]   [phone]            May 22, 2018 10:15 AM CDT   [facility]on Reich [facility]   [facility]    [address] [place] [address]   [phone]            May 23, 2018 10:00 AM CDT   Level 1 with SH INFUSION CHAIR 16   [facility] [place] [facility] ([facility])    [facility]  [address]  [place] [address]   [phone]            May 30, 2018 10:00 AM CDT   Level 1 with SH INFUSION CHAIR 5   [facility] ([facility])    [facility]  [address]  [place] [address]   [phone]            Jun 06, 2018  9:00 AM CDT   Level 1 with SH INFUSION CHAIR 19   [facility] [place] [facility] ([facility])    [facility]  [address]  [place] [address]   [phone]            Jun 06, 2018  9:45 AM CDT   Return Visit with Gretel  MD Kai   [facility] ([facility])    [facility] [place]  [address]  [place] [address] [phone]            Jun 13, 2018 10:00 AM CDT   Level 1 with  INFUSION CHAIR 11   [facility] ([facility])    [facility] [place]  [address]  [place] [address] [phone]            Jun 20, 2018  9:30 AM CDT   Return Visit with Kellee Meeks PA-C   [facility] ([facility] [place])    [address]  [address]  [place] [address] [phone]            Jun 20, 2018 10:00 AM CDT   Level 1 with  INFUSION CHAIR 6   [facility] ([facility])    [facility]  [address]  [place] [address] [phone]              Future tests that were ordered for you today     Open Standing Orders        Priority Remaining Interval Expires Ordered    Transfuse red blood cell unit Routine 99/100 TRANSFUSE 1 UNIT  5/18/2018    Red blood cell prepare order unit Routine 99/100 CONDITIONAL (SPECIFY) BLOOD  5/17/2018          Open Future Orders        Priority Expected Expires Ordered    CBC with platelets differential Routine  5/16/2019 5/17/2018    Comprehensive metabolic panel Routine  5/16/2019 5/17/2018            Who to contact     If you have questions or need follow up information about today's clinic visit or your schedule please contact [facility] directly at [phone].  Normal or non-critical lab and imaging results will be communicated to you by MyChart, letter or phone within 4 business days after the clinic has received the results. If you do not hear from us within 7 days, please contact the clinic through MyChart or phone. If you have a critical or abnormal lab result, we will notify you by phone as soon as possible.  Submit refill  "requests through [employer] or call your pharmacy and they will forward the refill request to us. Please allow 3 business days for your refill to be completed.          Additional Information About Your Visit        [employer] Information     [employer] lets you send messages to your doctor, view your test results, renew your prescriptions, schedule appointments and more. To sign up, go to [email or web address] . Click on \"Log in\" on the left side of the screen, which will take you to the Welcome page. Then click on \"Sign up Now\" on the right side of the page.     You will be asked to enter the access code listed below, as well as some personal information. Please follow the directions to create your username and password.     Your access code is: [email or web address]  Expires: 2018 11:49 AM     Your access code will  in 90 days. If you need help or a new code, please call your [facility] clinic or [phone].        Care EveryWhere ID     This is your Care EveryWhere ID. This could be used by other organizations to access your [facility] medical records  [id number]         Blood Pressure from Last 3 Encounters:   18 126/70   18 117/64   18 127/62    Weight from Last 3 Encounters:   18 91.8 kg (202 lb 6.4 oz)   18 91.8 kg (202 lb 6.4 oz)   18 90.7 kg (200 lb)              Today, you had the following     No orders found for display       Primary Care Provider Office Phone # Fax #    Dickson Reich [phone] [address]  [facility] [address]        Goals        Lifestyle    increase socialization.      Notes - Note created  2018  3:19 PM by Maura Reveles, RN    Goal Statement: I will meet my neighbors  Measure of Success: patient will meet 1 new person in his building each week.  Supportive Steps to Achieve: increase socialization.  Participate in scheduled group activities.  Barriers: pt states he likes to be alone.  Strengths: pt is " willing to try  Date to Achieve By: 1 month          Equal Access to Services     ELIE GAGNON : Jeanne Driver, consuelo zamarripa, melita pearson. So [facility] [phone].    ATENCIÓN: Si habla español, tiene a nolan disposición servicios gratuitos de asistencia lingüística. Llame al [phone].    We comply with applicable federal civil rights laws and [place] laws. We do not discriminate on the basis of race, color, national origin, age, disability, sex, sexual orientation, or gender identity.            Thank you!     Thank you for choosing [facility]  for your care. Our goal is always to provide you with excellent care. Hearing back from our patients is one way we can continue to improve our services. Please take a few minutes to complete the written survey that you may receive in the mail after your visit with us. Thank you!             Your Updated Medication List - Protect others around you: Learn how to safely use, store and throw away your medicines at www.disposemymeds.org.          This list is accurate as of 5/18/18 11:23 AM.  Always use your most recent med list.                   Brand Name Dispense Instructions for use Diagnosis    ACYCLOVIR PO      Take 400 mg by mouth 2 times daily Oncology to decide when stop date will be        amLODIPine 10 MG tablet    NORVASC    30 tablet    Take 1 tablet (10 mg) by mouth daily    Essential hypertension       aspirin 325 MG tablet     30 tablet    Take 1 tablet (325 mg) by mouth daily    Multiple myeloma not having achieved remission (H)       B-D U/F 31G X 8 MM   Generic drug:  insulin pen needle     500 each    USE 5 PEN NEEDLES PER DAY OR AS DIRECTED    Type 2 diabetes mellitus with stage 3 chronic kidney disease, with long-term current use of insulin (H)       Blood Glucose Monitoring Suppl Misc      3 times daily (before meals)        dexamethasone 4 MG tablet    DECADRON    40  tablet    Take 10 tablets (40 mg) by mouth once a week Once a week with food on Days 1,8,15 and 22.    Multiple myeloma not having achieved remission (H)       ertapenem 1 GM injection    INVanz    100 mL    Inject 1 g into the vein every 24 hours for 10 days CBC with differential, creatinine, SGOT weekly while on this medication to be faxed to Dr. Shaffer office.    Acute hemorrhagic cystitis       ferrous sulfate 325 (65 Fe) MG tablet    IRON     Take 325 mg by mouth daily (with breakfast)        FREESTYLE LITE test strip   Generic drug:  blood glucose monitoring     400 strip    USE TO TEST FOUR TIMES DAILY    Uncontrolled type 1 diabetes mellitus with stage 3 chronic kidney disease (H)       furosemide 20 MG tablet    LASIX    30 tablet    TAKE 1 TABLET BY MOUTH EVERY DAY    Multiple myeloma not having achieved remission (H)       gemfibrozil 600 MG tablet    LOPID    180 tablet    TAKE 1 TABLET BY MOUTH TWICE DAILY    Hyperlipidemia       * INSULIN ASPART SC      Inject 13 Units Subcutaneous every morning        * INSULIN ASPART SC      Inject 11 Units Subcutaneous daily (before lunch)        * INSULIN ASPART SC      Inject Subcutaneous At Bedtime Inject 4 unit subcutaneously at bedtime for Diabetes II Ok to use Humalog insulin with same order details        * INSULIN ASPART SC      Inject Subcutaneous 4 times daily Sliding scale: 140-175 1 unit 176-210 2 units 211-245 3 units 246-280 4 units 281-315 5 units 316-350 6 units 351-385 7 units 386-420 8 units >420 9 units        * insulin aspart 100 UNITS/ML injection    NovoLOG     Inject 5 Units Subcutaneous daily (with dinner)    Type 2 diabetes mellitus with stage 3 chronic kidney disease, with long-term current use of insulin (H)       insulin detemir 100 UNIT/ML injection    LEVEMIR FLEXPEN/FLEXTOUCH    15 mL    Inject 7 Units Subcutaneous every morning (before breakfast)    Type 2 diabetes mellitus with diabetic chronic kidney disease, unspecified CKD stage,  unspecified long term insulin use status (H), Uncontrolled type 2 diabetes mellitus with hyperglycemia, unspecified long term insulin use status (H), Type 2 diabetes mellitus with stage 3 chronic kidney disease, with long-term current use of insulin (H)       LENalidomide 5 MG Caps capsule CHEMO    REVLIMID    21 capsule    Take 1 capsule (5 mg) by mouth daily for 21 days Take on Days 1 through 21 of 28-day cycle.    Multiple myeloma not having achieved remission (H)       LISINOPRIL PO      Take 15 mg by mouth daily        LORazepam 0.5 MG tablet    ATIVAN    30 tablet    Take 1 tablet (0.5 mg) by mouth every 4 hours as needed (Anxiety, Nausea/Vomiting or Sleep)    Multiple myeloma not having achieved remission (H)       metoprolol succinate 50 MG 24 hr tablet    TOPROL-XL    30 tablet    Take 1 tablet (50 mg) by mouth daily    Atrial fibrillation with rapid ventricular response (H)       polyethylene glycol Packet    MIRALAX/GLYCOLAX    7 packet    Take 17 g by mouth 2 times daily as needed (constipation)    Slow transit constipation       prochlorperazine 5 MG tablet    COMPAZINE    30 tablet    Take 1 tablet (5 mg) by mouth every 6 hours as needed (Nausea/Vomiting)    Multiple myeloma not having achieved remission (H)       Selenium 200 MCG Tabs      Take 100 mcg by mouth daily. Pt takes one half tab of the 200 mcg daily        * SYNTHROID PO      Take 50 mcg by mouth twice a week Monday and Friday        * LEVOTHYROXINE SODIUM PO      Take 25 mcg by mouth five times a week Tuesday, Wednesday, Thursday, Saturday, Sunday        VITAMIN C PO      Take 500 mg by mouth daily        VITAMIN D (CHOLECALCIFEROL) PO      Take 1,000 Units by mouth daily        * Notice:  This list has 7 medication(s) that are the same as other medications prescribed for you. Read the directions carefully, and ask your doctor or other care provider to review them with you.

## 2018-05-18 NOTE — PROGRESS NOTES
I spoke with patients son Bam, he will be in town on Monday 5/21. I informed Bam that the shipment of Revlimid will be delivered to him that morning and that we gave him a calendar for when to take the medications. Bam knows to call our clinic if he has any further questions.

## 2018-05-19 NOTE — PROGRESS NOTES
Infusion Nursing Note:  Roc Parkinson presents today for Invanz.    Patient seen by provider today: No   present during visit today: Not Applicable.    Note:  Patient  unaware of why aspirin had been prescribed. Discussed importance of taking aspirin to prevent blood clots.    Intravenous Access:  Midline.    Treatment Conditions:  Not Applicable.    Post Infusion Assessment:  Patient tolerated infusion without incident.  Blood return noted pre and post infusion.  Site patent and intact, free from redness, edema or discomfort.  No evidence of extravasations.    Discharge Plan:   Discharge instructions reviewed with: Patient.  Patient and/or family verbalized understanding of discharge instructions and all questions answered.  Copy of AVS reviewed with patient and/or family.  Patient will return 5/20/2018 for next appointment.  Patient discharged in stable condition accompanied by: self.  Departure Mode: Ambulatory.    Patsy Singh RN

## 2018-05-19 NOTE — MR AVS SNAPSHOT
After Visit Summary   5/19/2018    Roc Parkinson    MRN: [id number]           Patient Information     Date Of Birth          7/7/1926        Visit Information        Provider Department      5/19/2018 11:00 AM  INFUSION CHAIR 3 [facility]        Today's Diagnoses     Acute hemorrhagic cystitis    -  1       Follow-ups after your visit        Your next 10 appointments already scheduled     May 20, 2018 12:00 PM CDT   Level 1 with  INFUSION CHAIR 8   [facility] ([facility])    [facility]  [address]  [place] [address]   [phone]            May 22, 2018 10:15 AM CDT   SHORT with Dickson Reich MD   [facility]    [address] [place] [address]   [phone]            May 30, 2018 10:30 AM CDT   Level 4 with  INFUSION CHAIR 2   [facility] ([facility])    [facility]  [address]  [place] [address]   [phone]            Jun 06, 2018  9:00 AM CDT   Level 4 with  INFUSION CHAIR 19   [facility] ([facility])    [facility]  [address]  [place] [address]   [phone]            Jun 06, 2018  9:45 AM CDT   Return Visit with Gretel Quinn MD   [facility] ([facility])    [facility]  [address]  [place] [address]   [phone]            Jun 20, 2018  9:30 AM CDT   Return Visit with Kellee Meeks PA-C   [facility] [place] ([facility] [place])    [address]  [address]  [place] [address] [phone]              Who to contact     If you have questions or need follow up  "information about today's clinic visit or your schedule please contact [facility] directly at [phone].  Normal or non-critical lab and imaging results will be communicated to you by [employer], letter or phone within 4 business days after the clinic has received the results. If you do not hear from us within 7 days, please contact the clinic through [employer] or phone. If you have a critical or abnormal lab result, we will notify you by phone as soon as possible.  Submit refill requests through [employer] or call your pharmacy and they will forward the refill request to us. Please allow 3 business days for your refill to be completed.          Additional Information About Your Visit        [employer] Information     [employer] lets you send messages to your doctor, view your test results, renew your prescriptions, schedule appointments and more. To sign up, go to [email or web address] . Click on \"Log in\" on the left side of the screen, which will take you to the Welcome page. Then click on \"Sign up Now\" on the right side of the page.     You will be asked to enter the access code listed below, as well as some personal information. Please follow the directions to create your username and password.     Your access code is: [email or web address]  Expires: 2018 11:49 AM     Your access code will  in 90 days. If you need help or a new code, please call your Center clinic or [phone].        Care EveryWhere ID     This is your Care EveryWhere ID. This could be used by other organizations to access your [facility] medical records  [id number]        Your Vitals Were     Pulse Temperature Respirations             84 97.4  F (36.3  C) (Oral) 16          Blood Pressure from Last 3 Encounters:   18 156/67   18 126/70   18 117/64    Weight from Last 3 Encounters:   18 91.8 kg (202 lb 6.4 oz)   18 91.8 kg (202 lb 6.4 oz)   18 90.7 kg (200 lb)            "   Today, you had the following     No orders found for display       Primary Care Provider Office Phone # Fax #    Dickson Clark Reich [phone]       [address] [facility] [address]        Goals        Lifestyle    increase socialization.      Notes - Note created  5/11/2018  3:19 PM by Maura Reveles RN    Goal Statement: I will meet my neighbors  Measure of Success: patient will meet 1 new person in his building each week.  Supportive Steps to Achieve: increase socialization.  Participate in scheduled group activities.  Barriers: pt states he likes to be alone.  Strengths: pt is willing to try  Date to Achieve By: 1 month          Equal Access to Services     ELIE GAGNON : Hadii virginia wilson hadasho Soaimee, waaxda luqadaha, qaybta kaalmada adesadieyada, melita rodriguez. So [facility] [phone].    ATENCIÓN: Si habla español, tiene a nolan disposición servicios gratuitos de asistencia lingüística. Llame al [phone].    We comply with applicable federal civil rights laws and [place] laws. We do not discriminate on the basis of race, color, national origin, age, disability, sex, sexual orientation, or gender identity.            Thank you!     Thank you for choosing [facility]  for your care. Our goal is always to provide you with excellent care. Hearing back from our patients is one way we can continue to improve our services. Please take a few minutes to complete the written survey that you may receive in the mail after your visit with us. Thank you!             Your Updated Medication List - Protect others around you: Learn how to safely use, store and throw away your medicines at www.disposemymeds.org.          This list is accurate as of 5/19/18 11:09 AM.  Always use your most recent med list.                   Brand Name Dispense Instructions for use Diagnosis    ACYCLOVIR PO      Take 400 mg by mouth 2 times daily Oncology to  decide when stop date will be        amLODIPine 10 MG tablet    NORVASC    30 tablet    Take 1 tablet (10 mg) by mouth daily    Essential hypertension       aspirin 325 MG tablet     30 tablet    Take 1 tablet (325 mg) by mouth daily    Multiple myeloma not having achieved remission (H)       B-D U/F 31G X 8 MM   Generic drug:  insulin pen needle     500 each    USE 5 PEN NEEDLES PER DAY OR AS DIRECTED    Type 2 diabetes mellitus with stage 3 chronic kidney disease, with long-term current use of insulin (H)       Blood Glucose Monitoring Suppl Misc      3 times daily (before meals)        dexamethasone 4 MG tablet    DECADRON    40 tablet    Take 10 tablets (40 mg) by mouth once a week Once a week with food on Days 1,8,15 and 22.    Multiple myeloma not having achieved remission (H)       ertapenem 1 GM injection    INVanz    100 mL    Inject 1 g into the vein every 24 hours for 10 days CBC with differential, creatinine, SGOT weekly while on this medication to be faxed to Dr. Shaffer office.    Acute hemorrhagic cystitis       ferrous sulfate 325 (65 Fe) MG tablet    IRON     Take 325 mg by mouth daily (with breakfast)        FREESTYLE LITE test strip   Generic drug:  blood glucose monitoring     400 strip    USE TO TEST FOUR TIMES DAILY    Uncontrolled type 1 diabetes mellitus with stage 3 chronic kidney disease (H)       furosemide 20 MG tablet    LASIX    30 tablet    TAKE 1 TABLET BY MOUTH EVERY DAY    Multiple myeloma not having achieved remission (H)       gemfibrozil 600 MG tablet    LOPID    180 tablet    TAKE 1 TABLET BY MOUTH TWICE DAILY    Hyperlipidemia       * INSULIN ASPART SC      Inject 13 Units Subcutaneous every morning        * INSULIN ASPART SC      Inject 11 Units Subcutaneous daily (before lunch)        * INSULIN ASPART SC      Inject Subcutaneous At Bedtime Inject 4 unit subcutaneously at bedtime for Diabetes II Ok to use Humalog insulin with same order details        * INSULIN ASPART SC       Inject Subcutaneous 4 times daily Sliding scale: 140-175 1 unit 176-210 2 units 211-245 3 units 246-280 4 units 281-315 5 units 316-350 6 units 351-385 7 units 386-420 8 units >420 9 units        * insulin aspart 100 UNITS/ML injection    NovoLOG     Inject 5 Units Subcutaneous daily (with dinner)    Type 2 diabetes mellitus with stage 3 chronic kidney disease, with long-term current use of insulin (H)       insulin detemir 100 UNIT/ML injection    LEVEMIR FLEXPEN/FLEXTOUCH    15 mL    Inject 7 Units Subcutaneous every morning (before breakfast)    Type 2 diabetes mellitus with diabetic chronic kidney disease, unspecified CKD stage, unspecified long term insulin use status (H), Uncontrolled type 2 diabetes mellitus with hyperglycemia, unspecified long term insulin use status (H), Type 2 diabetes mellitus with stage 3 chronic kidney disease, with long-term current use of insulin (H)       LENalidomide 5 MG Caps capsule CHEMO    REVLIMID    21 capsule    Take 1 capsule (5 mg) by mouth daily for 21 days Take on Days 1 through 21 of 28-day cycle.    Multiple myeloma not having achieved remission (H)       LISINOPRIL PO      Take 15 mg by mouth daily        LORazepam 0.5 MG tablet    ATIVAN    30 tablet    Take 1 tablet (0.5 mg) by mouth every 4 hours as needed (Anxiety, Nausea/Vomiting or Sleep)    Multiple myeloma not having achieved remission (H)       metoprolol succinate 50 MG 24 hr tablet    TOPROL-XL    30 tablet    Take 1 tablet (50 mg) by mouth daily    Atrial fibrillation with rapid ventricular response (H)       polyethylene glycol Packet    MIRALAX/GLYCOLAX    7 packet    Take 17 g by mouth 2 times daily as needed (constipation)    Slow transit constipation       prochlorperazine 5 MG tablet    COMPAZINE    30 tablet    Take 1 tablet (5 mg) by mouth every 6 hours as needed (Nausea/Vomiting)    Multiple myeloma not having achieved remission (H)       Selenium 200 MCG Tabs      Take 100 mcg by mouth daily. Pt  takes one half tab of the 200 mcg daily        * SYNTHROID PO      Take 50 mcg by mouth twice a week Monday and Friday        * LEVOTHYROXINE SODIUM PO      Take 25 mcg by mouth five times a week Tuesday, Wednesday, Thursday, Saturday, Sunday        VITAMIN C PO      Take 500 mg by mouth daily        VITAMIN D (CHOLECALCIFEROL) PO      Take 1,000 Units by mouth daily        * Notice:  This list has 7 medication(s) that are the same as other medications prescribed for you. Read the directions carefully, and ask your doctor or other care provider to review them with you.

## 2018-05-20 NOTE — MR AVS SNAPSHOT
After Visit Summary   5/20/2018    Roc Parkinson    MRN: [id number]           Patient Information     Date Of Birth          7/7/1926        Visit Information        Provider Department      5/20/2018 12:00 PM  INFUSION CHAIR 8 [facility]        Today's Diagnoses     Acute hemorrhagic cystitis    -  1       Follow-ups after your visit        Follow-up notes from your care team     Return in 10 days (on 5/30/2018).      Your next 10 appointments already scheduled     May 22, 2018 10:15 AM CDT   SHORT with Dickson Reich MD   [facility] ([facility])    [address]  [facility] [address]   [phone]            May 30, 2018 10:30 AM CDT   Level 4 with  INFUSION CHAIR 2   [facility] ([facility])    [facility]  [address]  [facility] [address] [phone]            Jun 06, 2018  9:00 AM CDT   Level 4 with  INFUSION CHAIR 19   [facility] ([facility])    [facility] [place]  [address]  [facility] [address] [phone]            Jun 06, 2018  9:45 AM CDT   Return Visit with Gretel Quinn MD   [facility] ([facility])    [facility] [place]  [address]  [facility] [address] [phone]            Jun 20, 2018  9:30 AM CDT   Return Visit with Kellee Meeks PA-C   [facility] ([facility])    [address]  [address]  [facility] [address] [phone]              Who to contact     If you have questions or need follow up information about today's clinic visit or your schedule please contact [facility] directly at [phone].  Normal or non-critical lab and  "imaging results will be communicated to you by MyChart, letter or phone within 4 business days after the clinic has received the results. If you do not hear from us within 7 days, please contact the clinic through [employer] or phone. If you have a critical or abnormal lab result, we will notify you by phone as soon as possible.  Submit refill requests through [employer] or call your pharmacy and they will forward the refill request to us. Please allow 3 business days for your refill to be completed.          Additional Information About Your Visit        [employer] Information     WindSim lets you send messages to your doctor, view your test results, renew your prescriptions, schedule appointments and more. To sign up, go to [email or web address] . Click on \"Log in\" on the left side of the screen, which will take you to the Welcome page. Then click on \"Sign up Now\" on the right side of the page.     You will be asked to enter the access code listed below, as well as some personal information. Please follow the directions to create your username and password.     Your access code is: [email or web address]  Expires: 2018 11:49 AM     Your access code will  in 90 days. If you need help or a new code, please call your [facility] clinic or [phone].        Care EveryWhere ID     This is your Care EveryWhere ID. This could be used by other organizations to access your [facility] medical records  [id number]        Your Vitals Were     Pulse Temperature Respirations Pulse Oximetry          80 97.7  F (36.5  C) (Oral) 20 97%         Blood Pressure from Last 3 Encounters:   18 166/77   18 156/67   18 126/70    Weight from Last 3 Encounters:   18 91.8 kg (202 lb 6.4 oz)   18 91.8 kg (202 lb 6.4 oz)   18 90.7 kg (200 lb)              Today, you had the following     No orders found for display       Primary Care Provider Office Phone # Fax #    Dickson Reich [phone] " [phone]       [address]  [facility] [address]        Goals        Lifestyle    increase socialization.      Notes - Note created  5/11/2018  3:19 PM by Maura Reveles, RN    Goal Statement: I will meet my neighbors  Measure of Success: patient will meet 1 new person in his building each week.  Supportive Steps to Achieve: increase socialization.  Participate in scheduled group activities.  Barriers: pt states he likes to be alone.  Strengths: pt is willing to try  Date to Achieve By: 1 month          Equal Access to Services     ELIE GAGNON AH: Hadii aad ku hadasho Soomaali, waaxda luqadaha, qaybta kaalmada adeegyada, waxay idiin hayaan constantine kharacarrillo shaw . So [facility] [phone].    ATENCIÓN: Si habla español, tiene a nolan disposición servicios gratuitos de asistencia lingüística. Llame al [phone].    We comply with applicable federal civil rights laws and [place] laws. We do not discriminate on the basis of race, color, national origin, age, disability, sex, sexual orientation, or gender identity.            Thank you!     Thank you for choosing [facility] AND [facility]  for your care. Our goal is always to provide you with excellent care. Hearing back from our patients is one way we can continue to improve our services. Please take a few minutes to complete the written survey that you may receive in the mail after your visit with us. Thank you!             Your Updated Medication List - Protect others around you: Learn how to safely use, store and throw away your medicines at www.disposemymeds.org.          This list is accurate as of 5/20/18 12:10 PM.  Always use your most recent med list.                   Brand Name Dispense Instructions for use Diagnosis    ACYCLOVIR PO      Take 400 mg by mouth 2 times daily Oncology to decide when stop date will be        amLODIPine 10 MG tablet    NORVASC    30 tablet    Take 1 tablet (10 mg) by mouth daily    Essential hypertension        aspirin 325 MG tablet     30 tablet    Take 1 tablet (325 mg) by mouth daily    Multiple myeloma not having achieved remission (H)       B-D U/F 31G X 8 MM   Generic drug:  insulin pen needle     500 each    USE 5 PEN NEEDLES PER DAY OR AS DIRECTED    Type 2 diabetes mellitus with stage 3 chronic kidney disease, with long-term current use of insulin (H)       Blood Glucose Monitoring Suppl Misc      3 times daily (before meals)        dexamethasone 4 MG tablet    DECADRON    40 tablet    Take 10 tablets (40 mg) by mouth once a week Once a week with food on Days 1,8,15 and 22.    Multiple myeloma not having achieved remission (H)       ertapenem 1 GM injection    INVanz    100 mL    Inject 1 g into the vein every 24 hours for 10 days CBC with differential, creatinine, SGOT weekly while on this medication to be faxed to Dr. Shaffer office.    Acute hemorrhagic cystitis       ferrous sulfate 325 (65 Fe) MG tablet    IRON     Take 325 mg by mouth daily (with breakfast)        FREESTYLE LITE test strip   Generic drug:  blood glucose monitoring     400 strip    USE TO TEST FOUR TIMES DAILY    Uncontrolled type 1 diabetes mellitus with stage 3 chronic kidney disease (H)       furosemide 20 MG tablet    LASIX    30 tablet    TAKE 1 TABLET BY MOUTH EVERY DAY    Multiple myeloma not having achieved remission (H)       gemfibrozil 600 MG tablet    LOPID    180 tablet    TAKE 1 TABLET BY MOUTH TWICE DAILY    Hyperlipidemia       * INSULIN ASPART SC      Inject 13 Units Subcutaneous every morning        * INSULIN ASPART SC      Inject 11 Units Subcutaneous daily (before lunch)        * INSULIN ASPART SC      Inject Subcutaneous At Bedtime Inject 4 unit subcutaneously at bedtime for Diabetes II Ok to use Humalog insulin with same order details        * INSULIN ASPART SC      Inject Subcutaneous 4 times daily Sliding scale: 140-175 1 unit 176-210 2 units 211-245 3 units 246-280 4 units 281-315 5 units 316-350 6 units 351-385 7  units 386-420 8 units >420 9 units        * insulin aspart 100 UNITS/ML injection    NovoLOG     Inject 5 Units Subcutaneous daily (with dinner)    Type 2 diabetes mellitus with stage 3 chronic kidney disease, with long-term current use of insulin (H)       insulin detemir 100 UNIT/ML injection    LEVEMIR FLEXPEN/FLEXTOUCH    15 mL    Inject 7 Units Subcutaneous every morning (before breakfast)    Type 2 diabetes mellitus with diabetic chronic kidney disease, unspecified CKD stage, unspecified long term insulin use status (H), Uncontrolled type 2 diabetes mellitus with hyperglycemia, unspecified long term insulin use status (H), Type 2 diabetes mellitus with stage 3 chronic kidney disease, with long-term current use of insulin (H)       LENalidomide 5 MG Caps capsule CHEMO    REVLIMID    21 capsule    Take 1 capsule (5 mg) by mouth daily for 21 days Take on Days 1 through 21 of 28-day cycle.    Multiple myeloma not having achieved remission (H)       LISINOPRIL PO      Take 15 mg by mouth daily        LORazepam 0.5 MG tablet    ATIVAN    30 tablet    Take 1 tablet (0.5 mg) by mouth every 4 hours as needed (Anxiety, Nausea/Vomiting or Sleep)    Multiple myeloma not having achieved remission (H)       metoprolol succinate 50 MG 24 hr tablet    TOPROL-XL    30 tablet    Take 1 tablet (50 mg) by mouth daily    Atrial fibrillation with rapid ventricular response (H)       polyethylene glycol Packet    MIRALAX/GLYCOLAX    7 packet    Take 17 g by mouth 2 times daily as needed (constipation)    Slow transit constipation       prochlorperazine 5 MG tablet    COMPAZINE    30 tablet    Take 1 tablet (5 mg) by mouth every 6 hours as needed (Nausea/Vomiting)    Multiple myeloma not having achieved remission (H)       Selenium 200 MCG Tabs      Take 100 mcg by mouth daily. Pt takes one half tab of the 200 mcg daily        * SYNTHROID PO      Take 50 mcg by mouth twice a week Monday and Friday        * LEVOTHYROXINE SODIUM PO       Take 25 mcg by mouth five times a week Tuesday, Wednesday, Thursday, Saturday, Sunday        VITAMIN C PO      Take 500 mg by mouth daily        VITAMIN D (CHOLECALCIFEROL) PO      Take 1,000 Units by mouth daily        * Notice:  This list has 7 medication(s) that are the same as other medications prescribed for you. Read the directions carefully, and ask your doctor or other care provider to review them with you.

## 2018-05-20 NOTE — PROGRESS NOTES
Infusion Nursing Note:  Roc Parkinson presents today for Har[place].    Patient seen by provider today: No   present during visit today: Not Applicable.    Note: N/A.    Intravenous Access:  Midline.    Treatment Conditions:  Not Applicable.      Post Infusion Assessment:  Patient tolerated infusion without incident.  Blood return noted pre and post infusion.  Site patent and intact, free from redness, edema or discomfort.  No evidence of extravasations.  Access discontinued per protocol.    Discharge Plan:   Patient declined prescription refills.  Discharge instructions reviewed with: Patient.  Patient verbalized understanding of discharge instructions and all questions answered.  Copy of AVS reviewed with patient.  Patient will return 5/30/18 for next appointment.  Patient discharged in stable condition accompanied by: self.  Departure Mode: Ambulatory.    Bere Bautista RN

## 2018-05-22 NOTE — PATIENT INSTRUCTIONS
I had a discussion with the patient and his son who accompanied him today.  His blood sugar seems to be pretty well controlled even with his chemotherapy and steroid treatment.  He is tolerating this chemotherapy course much better than the previous ones.  We did check a urinalysis today which was mildly positive and a urine culture is pending.  He has had recurrent urinary tract infections.  I did suggest that if this again proves to be the case that we should consider urologic referral to see if we are missing something that is causing his recurrent infections.  His blood pressure today is well controlled.  We will await his urine culture results.

## 2018-05-22 NOTE — MR AVS SNAPSHOT
After Visit Summary   5/22/2018    Roc Parkinson    MRN: [id number]           Patient Information     Date Of Birth          7/7/1926        Visit Information        Provider Department      5/22/2018 10:15 AM Dickson Reich MD [facility]        Today's Diagnoses     Urinary tract infection without hematuria, site unspecified    -  1    CKD (chronic kidney disease) stage 3, GFR 30-59 ml/min        Type 2 diabetes mellitus with stage 3 chronic kidney disease, with long-term current use of insulin (H)        Essential hypertension, benign        Multiple myeloma not having achieved remission (H)          Care Instructions    I had a discussion with the patient and his son who accompanied him today.  His blood sugar seems to be pretty well controlled even with his chemotherapy and steroid treatment.  He is tolerating this chemotherapy course much better than the previous ones.  We did check a urinalysis today which was mildly positive and a urine culture is pending.  He has had recurrent urinary tract infections.  I did suggest that if this again proves to be the case that we should consider urologic referral to see if we are missing something that is causing his recurrent infections.  His blood pressure today is well controlled.  We will await his urine culture results.          Follow-ups after your visit        Your next 10 appointments already scheduled     May 30, 2018 10:30 AM CDT   Level 4 with  INFUSION CHAIR 2   [facility] ([facility])    [facility]  [address]  [facility] [address]   [phone]            Jun 06, 2018  9:00 AM CDT   Level 4 with  INFUSION CHAIR 19   [facility] ([facility])    [facility]  [address]  [facility] [address]   [phone]            Jun 06, 2018  9:45  "AM CDT   Return Visit with Gretel Quinn MD   [facility] ([facility])    [facility]  [address]  [place] [address] [phone]            Jun 20, 2018  9:30 AM CDT   Return Visit with Kellee Meeks PA-C   [facility] [place] ([facility])    [address]  [address]  [facility] [address] [phone]            Jun 20, 2018 11:00 AM CDT   Level 4 with  INFUSION CHAIR 17   [facility] ([facility])    [facility]  [address]  [facility] [address] [phone]              Who to contact     If you have questions or need follow up information about today's clinic visit or your schedule please contact [facility] directly at [phone].  Normal or non-critical lab and imaging results will be communicated to you by [employer], letter or phone within 4 business days after the clinic has received the results. If you do not hear from us within 7 days, please contact the clinic through [employer] or phone. If you have a critical or abnormal lab result, we will notify you by phone as soon as possible.  Submit refill requests through [employer] or call your pharmacy and they will forward the refill request to us. Please allow 3 business days for your refill to be completed.          Additional Information About Your Visit        [employer] Information     [employer] lets you send messages to your doctor, view your test results, renew your prescriptions, schedule appointments and more. To sign up, go to [email or web address] . Click on \"Log in\" on the left side of the screen, which will take you to the Welcome page. Then click on \"Sign up Now\" on the right side of the page.     You will be asked to enter the access code listed below, as well as some personal information. Please follow the directions to " create your username and password.     Your access code is: [email or web address]  Expires: 2018 11:49 AM     Your access code will  in 90 days. If you need help or a new code, please call your [facility] clinic or [phone].        Care EveryWhere ID     This is your Care EveryWhere ID. This could be used by other organizations to access your [facility] medical records  [id number]        Your Vitals Were     Pulse Temperature Respirations BMI (Body Mass Index)          62 97  F (36.1  C) (Tympanic) 16 29.52 kg/m2         Blood Pressure from Last 3 Encounters:   18 138/70   18 166/77   18 156/67    Weight from Last 3 Encounters:   18 200 lb (90.7 kg)   18 202 lb 6.4 oz (91.8 kg)   18 202 lb 6.4 oz (91.8 kg)              We Performed the Following     UA with Microscopic reflex to Culture     Urine Culture Aerobic Bacterial        Primary Care Provider Office Phone # Fax #    Dickson Clark Reich [phone] [address] [facility] [address] [facility] [address]        Goals        Lifestyle    increase socialization.      Notes - Note created  2018  3:19 PM by Maura Reveles, RN    Goal Statement: I will meet my neighbors  Measure of Success: patient will meet 1 new person in his building each week.  Supportive Steps to Achieve: increase socialization.  Participate in scheduled group activities.  Barriers: pt states he likes to be alone.  Strengths: pt is willing to try  Date to Achieve By: 1 month          Equal Access to Services     [facility] AH: Hadii virginia wilson hadasho Soaimee, waaxda luqadaha, qaybta kaalmada melita thomason. So [facility] [phone].    ATENCIÓN: Si habla español, tiene a nolan disposición servicios gratuitos de asistencia lingüística. Llame al [phone].    We comply with applicable federal civil rights laws and [place] laws. We do not discriminate on the basis of race, color, national origin, age,  disability, sex, sexual orientation, or gender identity.            Thank you!     Thank you for choosing [facility]  for your care. Our goal is always to provide you with excellent care. Hearing back from our patients is one way we can continue to improve our services. Please take a few minutes to complete the written survey that you may receive in the mail after your visit with us. Thank you!             Your Updated Medication List - Protect others around you: Learn how to safely use, store and throw away your medicines at www.disposemymeds.org.          This list is accurate as of 5/22/18 12:56 PM.  Always use your most recent med list.                   Brand Name Dispense Instructions for use Diagnosis    ACYCLOVIR PO      Take 400 mg by mouth 2 times daily Oncology to decide when stop date will be        amLODIPine 10 MG tablet    NORVASC    30 tablet    Take 1 tablet (10 mg) by mouth daily    Essential hypertension       aspirin 325 MG tablet     30 tablet    Take 1 tablet (325 mg) by mouth daily    Multiple myeloma not having achieved remission (H)       B-D U/F 31G X 8 MM   Generic drug:  insulin pen needle     500 each    USE 5 PEN NEEDLES PER DAY OR AS DIRECTED    Type 2 diabetes mellitus with stage 3 chronic kidney disease, with long-term current use of insulin (H)       Blood Glucose Monitoring Suppl Misc      3 times daily (before meals)        dexamethasone 4 MG tablet    DECADRON    40 tablet    Take 10 tablets (40 mg) by mouth once a week Once a week with food on Days 1,8,15 and 22.    Multiple myeloma not having achieved remission (H)       ferrous sulfate 325 (65 Fe) MG tablet    IRON     Take 325 mg by mouth daily (with breakfast)        FREESTYLE LITE test strip   Generic drug:  blood glucose monitoring     400 strip    USE TO TEST FOUR TIMES DAILY    Uncontrolled type 1 diabetes mellitus with stage 3 chronic kidney disease (H)       furosemide 20 MG tablet     LASIX    30 tablet    TAKE 1 TABLET BY MOUTH EVERY DAY    Multiple myeloma not having achieved remission (H)       gemfibrozil 600 MG tablet    LOPID    180 tablet    TAKE 1 TABLET BY MOUTH TWICE DAILY    Hyperlipidemia       * INSULIN ASPART SC      Inject 13 Units Subcutaneous every morning        * INSULIN ASPART SC      Inject 11 Units Subcutaneous daily (before lunch)        * INSULIN ASPART SC      Inject Subcutaneous At Bedtime Inject 4 unit subcutaneously at bedtime for Diabetes II Ok to use Humalog insulin with same order details        * INSULIN ASPART SC      Inject Subcutaneous 4 times daily Sliding scale: 140-175 1 unit 176-210 2 units 211-245 3 units 246-280 4 units 281-315 5 units 316-350 6 units 351-385 7 units 386-420 8 units >420 9 units        * insulin aspart 100 UNITS/ML injection    NovoLOG     Inject 5 Units Subcutaneous daily (with dinner)    Type 2 diabetes mellitus with stage 3 chronic kidney disease, with long-term current use of insulin (H)       insulin detemir 100 UNIT/ML injection    LEVEMIR FLEXPEN/FLEXTOUCH    15 mL    Inject 7 Units Subcutaneous every morning (before breakfast)    Type 2 diabetes mellitus with diabetic chronic kidney disease, unspecified CKD stage, unspecified long term insulin use status (H), Uncontrolled type 2 diabetes mellitus with hyperglycemia, unspecified long term insulin use status (H), Type 2 diabetes mellitus with stage 3 chronic kidney disease, with long-term current use of insulin (H)       LENalidomide 5 MG Caps capsule CHEMO    REVLIMID    21 capsule    Take 1 capsule (5 mg) by mouth daily for 21 days Take on Days 1 through 21 of 28-day cycle.    Multiple myeloma not having achieved remission (H)       LISINOPRIL PO      Take 15 mg by mouth daily        LORazepam 0.5 MG tablet    ATIVAN    30 tablet    Take 1 tablet (0.5 mg) by mouth every 4 hours as needed (Anxiety, Nausea/Vomiting or Sleep)    Multiple myeloma not having achieved remission (H)        metoprolol succinate 50 MG 24 hr tablet    TOPROL-XL    30 tablet    Take 1 tablet (50 mg) by mouth daily    Atrial fibrillation with rapid ventricular response (H)       polyethylene glycol Packet    MIRALAX/GLYCOLAX    7 packet    Take 17 g by mouth 2 times daily as needed (constipation)    Slow transit constipation       prochlorperazine 5 MG tablet    COMPAZINE    30 tablet    Take 1 tablet (5 mg) by mouth every 6 hours as needed (Nausea/Vomiting)    Multiple myeloma not having achieved remission (H)       Selenium 200 MCG Tabs      Take 100 mcg by mouth daily. Pt takes one half tab of the 200 mcg daily        * SYNTHROID PO      Take 50 mcg by mouth twice a week Monday and Friday        * LEVOTHYROXINE SODIUM PO      Take 25 mcg by mouth five times a week Tuesday, Wednesday, Thursday, Saturday, Sunday        VITAMIN C PO      Take 500 mg by mouth daily        VITAMIN D (CHOLECALCIFEROL) PO      Take 1,000 Units by mouth daily        * Notice:  This list has 7 medication(s) that are the same as other medications prescribed for you. Read the directions carefully, and ask your doctor or other care provider to review them with you.

## 2018-05-22 NOTE — TELEPHONE ENCOUNTER
Writer called physical therapy  to see if they have connected with Mr. Parkinson yet.  They mentioned they have and he declined any physical therapy since he already has too many appointments right now.    Will follow up with Mr. Parkinson next week about this.

## 2018-05-22 NOTE — PROGRESS NOTES
SUBJECTIVE:   Roc Parkinson is a 89 year old male who presents to clinic today for the following health issues:      Hypertension Follow-up      Outpatient blood pressures are not being checked.    Low Salt Diet: not monitoring salt      Amount of exercise or physical activity: None    Problems taking medications regularly: No    Medication side effects: none    Diet: regular (no restrictions)        Diabetes Follow-up    Patient is checking blood sugars: twice daily.    Blood sugar testing frequency justification: Adjustment of medication(s)  Results are as follows:    Usually his blood sugars have been down in the low 100s.  He just started new chemotherapy and takes 10 methylprednisone tablets at the beginning of each course.  In the past his blood sugars have spiked up into the 300s with that.  However this time, they only went into the 200s.    Diabetic concerns: None and other -chemotherapy     Symptoms of hypoglycemia (low blood sugar): none     Paresthesias (numbness or burning in feet) or sores: Yes numbness     Date of last diabetic eye exam: 2017    BP Readings from Last 2 Encounters:   05/22/18 138/70   05/20/18 166/77     Hemoglobin A1C (%)   Date Value   03/22/2018 5.5   01/08/2018 6.3 (H)     LDL Cholesterol Calculated (mg/dL)   Date Value   05/23/2017 64   05/09/2016 61       Problem list and histories reviewed & adjusted, as indicated.  Additional history: As noted above the patient just started his new chemotherapy regimen.  He has had 2 doses and absolutely no side effects at this point.  Also, his blood sugar did not spike nearly as much after taking the methylprednisolone as it has in the past with his chemotherapy.    Patient Active Problem List   Diagnosis     Dysphagia     Malignant neoplasm of prostate (H)     Malignant neoplasm of bladder (H)     Essential hypertension, benign     Asymptomatic varicose veins     Congenital hiatus hernia     Oral lesion     CTS (carpal tunnel syndrome)      Irritable bowel syndrome     Vitamin D deficiency disease     Microalbuminuria     Obesity     UTI (urinary tract infection) w hx of recurrence     Iron deficiency anemia     Nonsmoker     A-fib (H)     Health Care Home     Hyperlipidemia     CKD (chronic kidney disease) stage 3, GFR 30-59 ml/min     Hypothyroidism     Long-term (current) use of anticoagulants [Z79.01]     Macrocytic anemia     GIB (gastrointestinal bleeding)     Multiple myeloma not having achieved remission (H)     Hypercalcemia     Hyperglycemia     Urinary tract infection     Hyperkalemia     ACP (advance care planning)     MDS (myelodysplastic syndrome) (H)     Chemotherapy-induced neutropenia (H)     Anemia in neoplastic disease     Type 2 diabetes mellitus with stage 3 chronic kidney disease, with long-term current use of insulin (H)     UTI due to extended-spectrum beta lactamase (ESBL) producing Escherichia coli     Essential hypertension     Physical deconditioning     Atrial fibrillation with RVR (H)     Hematuria     Acute hemorrhagic cystitis     Past Surgical History:   Procedure Laterality Date     ARTHROPLASTY KNEE  11/5/2012    Procedure: ARTHROPLASTY KNEE;  RIGHT TOTAL KNEE ARTHROPLASTY (SMITH & NEPHEW)^;  Surgeon: Dickson Schulte MD;  Location:  OR     BIOPSY      bladder     COLONOSCOPY  2007     COLONOSCOPY N/A 11/15/2016    Procedure: COMBINED COLONOSCOPY, SINGLE OR MULTIPLE BIOPSY/POLYPECTOMY BY BIOPSY;  Surgeon: Kenneth Fulton MD;  Location:  GI     GENITOURINARY SURGERY      TURP x2 and bladder scraping     GI SURGERY      anal fistula     ORTHOPEDIC SURGERY      lt knee in nov.2009     PHACOEMULSIFICATION CLEAR CORNEA WITH STANDARD INTRAOCULAR LENS IMPLANT Left 10/25/2017    Procedure: PHACOEMULSIFICATION CLEAR CORNEA WITH STANDARD INTRAOCULAR LENS IMPLANT;  LEFT EYE PHACOEMULSIFICATION CLEAR CORNEA WITH STANDARD INTRAOCULAR LENS IMPLANT ;  Surgeon: Shady Haider MD;  Location: [facility]      PHACOEMULSIFICATION CLEAR CORNEA WITH STANDARD INTRAOCULAR LENS IMPLANT Right 11/1/2017    Procedure: PHACOEMULSIFICATION CLEAR CORNEA WITH STANDARD INTRAOCULAR LENS IMPLANT;  RIGHT EYE PHACOEMULSIFICATION CLEAR CORNEA WITH STANDARD INTRAOCULAR LENS IMPLANT;  Surgeon: Shady Haider MD;  Location: [facility]     SOFT TISSUE SURGERY      melanoma       Social History   Substance Use Topics     Smoking status: Never Smoker     Smokeless tobacco: Never Used     Alcohol use No     Family History   Problem Relation Age of Onset     Cardiovascular Father 81     heart arrythmia     Endocrine Disease Brother 74     Paget's           Reviewed and updated as needed this visit by clinical staff  Tobacco  Allergies  Meds  Med Hx  Surg Hx  Fam Hx  Soc Hx      Reviewed and updated as needed this visit by Provider         ROS:  Constitutional, HEENT, cardiovascular, pulmonary, gi and gu systems are negative, except as otherwise noted.    OBJECTIVE:                                                    /70 (Cuff Size: Adult Large)  Pulse 62  Temp 97  F (36.1  C) (Tympanic)  Resp 16  Wt 200 lb (90.7 kg)  BMI 29.52 kg/m2  Body mass index is 29.52 kg/(m^2).  GENERAL APPEARANCE: healthy, alert and no distress    Diagnostic test results:  Results for orders placed or performed in visit on 05/22/18 (from the past 24 hour(s))   UA with Microscopic reflex to Culture   Result Value Ref Range    Color Urine Yellow     Appearance Urine Clear     Glucose Urine Negative NEG^Negative mg/dL    Bilirubin Urine Negative NEG^Negative    Ketones Urine Negative NEG^Negative mg/dL    Specific Gravity Urine 1.020 1.003 - 1.035    pH Urine 5.0 5.0 - 7.0 pH    Protein Albumin Urine 30 (A) NEG^Negative mg/dL    Urobilinogen Urine 0.2 0.2 - 1.0 EU/dL    Nitrite Urine Negative NEG^Negative    Blood Urine Moderate (A) NEG^Negative    Leukocyte Esterase Urine Trace (A) NEG^Negative    Source Midstream Urine     WBC Urine 5-10 (A) OTO5^0 - 5 /HPF     RBC Urine 2-5 (A) OTO2^O - 2 /HPF    Squamous Epithelial /LPF Urine Few FEW^Few /LPF        ASSESSMENT/PLAN:                                                        ICD-10-CM    1. Urinary tract infection without hematuria, site unspecified N39.0 UA with Microscopic reflex to Culture   2. CKD (chronic kidney disease) stage 3, GFR 30-59 ml/min N18.3    3. Type 2 diabetes mellitus with stage 3 chronic kidney disease, with long-term current use of insulin (H) E11.22     N18.3     Z79.4    4. Essential hypertension, benign I10    5. Multiple myeloma not having achieved remission (H) C90.00        Patient Instructions   I had a discussion with the patient and his son who accompanied him today.  His blood sugar seems to be pretty well controlled even with his chemotherapy and steroid treatment.  He is tolerating this chemotherapy course much better than the previous ones.  We did check a urinalysis today which was mildly positive and a urine culture is pending.  He has had recurrent urinary tract infections.  I did suggest that if this again proves to be the case that we should consider urologic referral to see if we are missing something that is causing his recurrent infections.  His blood pressure today is well controlled.  We will await his urine culture results.      Dickson Reich MD  [facility]

## 2018-05-23 NOTE — PROGRESS NOTES
Oral Chemotherapy Monitoring Program    Primary Oncologist: Kai  Primary Oncology Clinic: [facility]  Cancer Diagnosis: Multiple Myeloma    Therapy History:  5/21/18 - Revlimid 5mg daily for 21 days then 7 days off    Drug Interaction Assessment: None    Lab Monitoring Plan  CBC every 2 weeks for 3 cycles then monthly thereafter. Monthly CMP  Subjective/Objective:  Roc Parkinson is a 89 year old male contacted by phone for a follow-up visit for oral chemotherapy.  Patient reports that he started the current cycle of Revlimid on Monday 5/21/18. He has been taking it as prescribed and denies any adverse effects at this time. The patient also took Dexamethasone on Monday and will be taking Dex once weekly on Mondays.    ORAL CHEMOTHERAPY 5/10/2017 6/14/2017 8/16/2017 9/7/2017 10/18/2017 5/17/2018 5/23/2018   Drug Name Cytoxan (Cyclophsphamide) Cytoxan (Cyclophsphamide) Cytoxan (Cyclophsphamide) Cytoxan (Cyclophsphamide) Cytoxan (Cyclophsphamide) Revlimid (Lenalidomide) Revlimid (Lenalidomide)   Current Dosage 300mg 300mg 300mg 300mg 300mg 5mg 5mg   Current Schedule Weekly Weekly Weekly Weekly Weekly Daily Daily   Cycle Details Continuous Continuous Continuous Continuous Continuous 3 weeks on 1 week off 3 weeks on 1 week off   Start Date of Last Cycle 4/20/2017 6/5/2017 - - - - 5/21/2018   Planned next cycle start date - 7/3/2017 - - 9/27/2017 - 6/18/2018   Doses missed in last 2 weeks 0 0 0 0 0 - 0   Adherence Assessment Adherent Adherent Adherent Adherent Adherent - Adherent   Adverse Effects No AE identified during assessment No AE identified during assessment No AE identified during assessment No AE identified during assessment No AE identified during assessment - No AE identified during assessment   Home BPs - - all BPs<140/90 - - - not needed   Any new drug interactions? No - No No - - No   Is the dose as ordered appropriate for the patient? Yes - Yes Yes - - Yes   Is the patient currently in pain? Assessed in  "last 30 days. - No Assessed in last 30 days. - - Assessed in last 30 days.   Has the patient been assessed within the past 6 months for depression? - - Yes Yes - - Yes   Has the patient missed any days of school, work, or other routine activity? - - No No - - No       Last PHQ-2 Score on record:   PHQ-2 ( [facility]) 5/22/2018 4/3/2018   Q1: Little interest or pleasure in doing things 0 0   Q2: Feeling down, depressed or hopeless 0 0   PHQ-2 Score 0 0   Q1: Little interest or pleasure in doing things - -   Q2: Feeling down, depressed or hopeless - -   PHQ-2 Score - -           Vitals:  BP:   BP Readings from Last 1 Encounters:   05/22/18 138/70     Wt Readings from Last 1 Encounters:   05/22/18 90.7 kg (200 lb)     Estimated body surface area is 2.1 meters squared as calculated from the following:    Height as of 5/16/18: 1.753 m (5' 9.02\").    Weight as of 5/22/18: 90.7 kg (200 lb).    Labs:  _  Result Component Current Result Ref Range   Sodium 142 (5/16/2018) 133 - 144 mmol/L     _  Result Component Current Result Ref Range   Potassium 4.0 (5/16/2018) 3.4 - 5.3 mmol/L     _  Result Component Current Result Ref Range   Calcium 8.7 (5/16/2018) 8.5 - 10.1 mg/dL     No results found for Mag within last 30 days.     No results found for Phos within last 30 days.     _  Result Component Current Result Ref Range   Albumin 2.2 (L) (5/16/2018) 3.4 - 5.0 g/dL     _  Result Component Current Result Ref Range   Urea Nitrogen 32 (H) (5/16/2018) 7 - 30 mg/dL     _  Result Component Current Result Ref Range   Creatinine 1.18 (5/17/2018) 0.66 - 1.25 mg/dL       _  Result Component Current Result Ref Range   AST 18 (5/17/2018) 0 - 45 U/L     _  Result Component Current Result Ref Range   ALT 15 (5/16/2018) 0 - 70 U/L     _  Result Component Current Result Ref Range   Bilirubin Total 0.2 (5/16/2018) 0.2 - 1.3 mg/dL       _  Result Component Current Result Ref Range   WBC 3.1 (L) (5/16/2018) 4.0 - 11.0 10e9/L     _  Result " Component Current Result Ref Range   Hemoglobin 7.9 (L) (5/16/2018) 13.3 - 17.7 g/dL     _  Result Component Current Result Ref Range   Platelet Count 111 (L) (5/16/2018) 150 - 450 10e9/L     _  Result Component Current Result Ref Range   Absolute Neutrophil 1.8 (5/16/2018) 1.6 - 8.3 10e9/L               Assessment:  Patient is tolerating current regimen.    Plan:  No changes at this time. Patient will have labs in approximately 1 week.    Follow-Up:  5/30/18    Refill Due:  6/18/18    Alvarez May PharmD

## 2018-05-23 NOTE — IP AVS SNAPSHOT
` [place] [id number] ONCOLOGY: [phone]            Medication Administration Report for Roc Parkinson as of 05/30/18 0910   Legend:    Given Hold Not Given Due Canceled Entry Other Actions    Time Time (Time) Time  Time-Action       Inactive    Active    Linked        Medications 05/24/18 05/25/18 05/26/18 05/27/18 05/28/18 05/29/18 05/30/18    acetaminophen (TYLENOL) Suppository 650 mg  Dose: 650 mg  Freq: EVERY 4 HOURS PRN Route: RE  PRN Reason: mild pain  Start: 05/24/18 0023   Admin Instructions: Alternate ibuprofen (if ordered) with acetaminophen.  Maximum acetaminophen dose from all sources = 75 mg/kg/day not to exceed 4 grams/day.    Admin. Amount: 1 suppository (1 × 650 mg suppository)  Dispense Loc:  [facility] 88B               acetaminophen (TYLENOL) tablet 650 mg  Dose: 650 mg  Freq: EVERY 4 HOURS PRN Route: PO  PRN Reason: mild pain  Start: 05/24/18 0023   Admin Instructions: Alternate ibuprofen (if ordered) with acetaminophen.  Maximum acetaminophen dose from all sources = 75 mg/kg/day not to exceed 4 grams/day.    Admin. Amount: 2 tablet (2 × 325 mg tablet)  Last Admin: 05/25/18 0730  Dispense Loc:  [facility] 88[facility]     0837 (650 mg)-Given       1726 (650 mg)-Given       2127 (650 mg)-Given        0147 (650 mg)-Given       0730 (650 mg)-Given                atropine 1 % ophthalmic solution 1-2 drop  Dose: 1-2 drop  Freq: EVERY 1 HOUR PRN Route: SL  PRN Reason: other  PRN Comment: secretions  Start: 05/29/18 1045   Admin. Amount: 1-2 drop  Dispense Loc:  [facility] 88B  Volume: 2 mL               glucose gel 15-30 g  Dose: 15-30 g  Freq: EVERY 15 MIN PRN Route: PO  PRN Reason: low blood sugar  Start: 05/26/18 1201   Admin Instructions: Give 15 g for BG 51 to 69 mg/dL IF patient is conscious and able to swallow. Give 30 g for BG less than or equal to 50 mg/dL IF patient is conscious and able to swallow. Do NOT give glucose gel via enteral tube.  IF patient has enteral tube: give apple juice 120 mL (4  oz or 15 g of CHO) via enteral tube for BG 51 to 69 mg/dL.  Give apple juice 240 mL (8 oz or 30 g of CHO) via enteral tube for BG less than or equal to 50 mg/dL.    ~Oral gel is preferable for conscious and able to swallow patient.   ~IF gel unavailable or patient refuses may provide apple juice 120 mL (4 oz or 15 g of CHO). Document juice on I and O flowsheet.    Admin. Amount: 15-30 g  Dispense Loc: [facility]  Volume: 93.75 mL              Or  dextrose 50 % injection 25-50 mL  Dose: 25-50 mL  Freq: EVERY 15 MIN PRN Route: IV  PRN Reason: low blood sugar  Start: 05/26/18 1201   Admin Instructions: Use if have IV access, BG less than 70 mg/dL and meet dose criteria below:  Dose if conscious and alert (or disorientated) and NPO = 25 mL  Dose if unconscious / not alert = 50 mL  Vesicant. For ordered doses up to 25 g, give IV Push undiluted. Give each 5g over 1 minute.    Admin. Amount: 25-50 mL  Dispense Loc: [facility]  Infused Over: 1-5 Minutes  Volume: 50 mL              Or  glucagon injection 1 mg  Dose: 1 mg  Freq: EVERY 15 MIN PRN Route: SC  PRN Reason: low blood sugar  PRN Comment: May repeat x 1 only  Start: 05/26/18 1201   Admin Instructions: May give SQ or IM. ONLY use glucagon IF patient has NO IV access AND is UNABLE to swallow AND blood glucose is LESS than or EQUAL to 50 mg/dL.  If ordered IV, give IV Push over 1 minute. Reconstitute with 1mL sterile water.    Admin. Amount: 1 mg  Dispense Loc: [facility]               glycopyrrolate (ROBINUL) injection 0.2-0.4 mg  Dose: 0.2-0.4 mg  Freq: EVERY 4 HOURS PRN Route: IV  PRN Reason: other  PRN Comment: secretions  Start: 05/29/18 1045   Admin Instructions: For ordered doses up to 0.2 mg, give IV Push undiluted over 1-2 minutes.    Admin. Amount: 0.2-0.4 mg = 1-2 mL Conc: 0.2 mg/mL  Dispense Loc: [facility]  Infused Over: 1-2 Minutes  Volume: 2 mL               haloperidol (HALDOL) 2 MG/ML (HIGH CONC) solution 1-2 mg  Dose: 1-2 mg  Freq: EVERY 6 HOURS PRN  Route: PO  PRN Reasons: agitation,other  PRN Comment: hallucinations, delirium, restlessness, nausea  Start: 05/29/18 1045   Admin. Amount: 1-2 mg = 0.5-1 mL Conc: 2 mg/mL  Last Admin: 05/29/18 2250  Dispense Loc:  Main Pharmacy  Volume: 1 mL          1600 (2 mg)-Given       2250 (2 mg)-Given            HYDROmorphone (DILAUDID) (HIGH CONC) oral solution 2-4 mg  Dose: 2-4 mg  Freq: EVERY 2 HOURS PRN Route: SL  PRN Reasons: moderate to severe pain,other  PRN Comment: SOB, dyspnea, distress  Start: 05/29/18 1046   Admin Instructions: Injectable use ORALLY. CAUTION: Special high concentration formulation. Verify dose and volume before administration    Admin. Amount: 2-4 mg = 0.2-0.4 mL Conc: 10 mg/mL  Dispense Loc: [facility] 88B  Volume: 1 mL               insulin aspart (NovoLOG) inj (RAPID ACTING)  Dose: 1-3 Units  Freq: AT BEDTIME Route: SC  Start: 05/26/18 2200   Admin Instructions: LOW INSULIN RESISTANCE DOSING    Do Not give Bedtime Correction Insulin if BG less than 200.   For  - 299 give 1 unit.   For  - 399 give 2 units   For BG greater than or equal 400 give 3 units.   Notify provider if glucose greater than or equal to 350 mg/dL after administration of correction dose.  If given at mealtime, must be administered 5 min before meal or immediately after.    Admin. Amount: 1-3 Units  Dispense Loc: Contact Rx for dose  Volume: 3 mL       (2212)-Not Given        (2125)-Not Given [C]        (2148)-Not Given                [ ] 2200           insulin aspart (NovoLOG) inj (RAPID ACTING)  Dose: 1-3 Units  Freq: 3 TIMES DAILY BEFORE MEALS Route: SC  Start: 05/26/18 1215   Admin Instructions: Correction Scale - LOW INSULIN RESISTANCE DOSING     Do Not give Correction Insulin if Pre-Meal BG less than 140.   For Pre-Meal  - 239 give 1 unit.   For Pre-Meal  - 339 give 2 units.   For Pre-Meal BG greater than or equal to 340 give 3 units.   To be given with prandial insulin, and based on pre-meal blood  "glucose.   Notify provider if glucose greater than or equal to 350 mg/dL after administration of correction dose.  If given at mealtime, must be administered 5 min before meal or immediately after.    Admin. Amount: 1-3 Units  Last Admin: 05/29/18 0843  Dispense Loc: Contact Rx for dose  Volume: 3 mL       (1222)-Not Given       (1613)-Not Given        (0736)-Not Given       (1205)-Not Given       (1726)-Not Given        (0728)-Not Given       1141 (1 Units)-Given       1706 (1 Units)-Given        0843 (1 Units)-Given       (1555)-Not Given               [ ] 0730       [ ] 1200       [ ] 1700           levothyroxine (SYNTHROID/LEVOTHROID) tablet 25 mcg  Dose: 25 mcg  Freq: Once per day on Sun Tue Wed Thu Sat Route: PO  Start: 05/24/18 0900   Admin. Amount: 1 tablet (1 × 25 mcg tablet)  Last Admin: 05/30/18 0808  Dispense Loc: [facility] 88B     0859 (25 mcg)-Given         0956 (25 mcg)-Given [C]        0900 (25 mcg)-Given [C]        0828 (25 mcg)-Given        1036 (25 mcg)-Given        0808 (25 mcg)-Given           levothyroxine (SYNTHROID/LEVOTHROID) tablet 50 mcg  Dose: 50 mcg  Freq: Once per day on Mon Fri Route: PO  Start: 05/25/18 0900   Admin. Amount: 1 tablet (1 × 50 mcg tablet)  Last Admin: 05/28/18 0829  Dispense Loc: [facility] 88[facility]      0927 (50 mcg)-Given          0829 (50 mcg)-Given             lidocaine (LMX4) cream  Freq: EVERY 1 HOUR PRN Route: Top  PRN Reason: pain  PRN Comment: with VAD insertion or accessing implanted port.  Start: 05/24/18 0023   Admin Instructions: Do NOT give if patient has a history of allergy to any local anesthetic or any \"chelsie\" product.   Apply 30 minutes prior to VAD insertion or port access.  MAX Dose:  2.5 g (  of 5 g tube)    Dispense Loc:  [facility] 88B               lidocaine 1 % 1 mL  Dose: 1 mL  Freq: EVERY 1 HOUR PRN Route: OTHER  PRN Comment: mild pain with VAD insertion or accessing implanted port  Start: 05/24/18 0023   Admin Instructions: Do NOT give if patient has a " "history of allergy to any local anesthetic or any \"chelsie\" product. MAX dose 1 mL subcutaneous OR intradermal in divided doses.    Admin. Amount: 1 mL  Dispense Loc:  [facility]LOOR [facility]  Volume: 2 mL               magnesium hydroxide (MILK OF MAGNESIA) suspension 30 mL  Dose: 30 mL  Freq: DAILY PRN Route: PO  PRN Reason: constipation  Start: 05/24/18 0023   Admin Instructions: Hold for loose stools.  This is the second step of a three step constipation treatment.    Admin. Amount: 30 mL  Dispense Loc:  [facility] 88B  Volume: 30 mL               melatonin tablet 1 mg  Dose: 1 mg  Freq: AT BEDTIME PRN Route: PO  PRN Reason: sleep  Start: 05/24/18 0023   Admin Instructions: Do not give unless at least 6 hours of uninterrupted sleep is expected.    Admin. Amount: 1 tablet (1 × 1 mg tablet)  Dispense Loc:  [facility] 88B               miconazole (MICATIN; MICRO GUARD) 2 % powder  Freq: EVERY 1 HOUR PRN Route: Top  PRN Reason: other  PRN Comment: topical candidiasis  Start: 05/28/18 2150   Admin Instructions: Apply to affected area.      Dispense Loc: [facility] 88 TOWER               naloxone (NARCAN) injection 0.1-0.4 mg  Dose: 0.1-0.4 mg  Freq: EVERY 2 MIN PRN Route: IV  PRN Reason: opioid reversal  Start: 05/24/18 0023   Admin Instructions: For respiratory rate LESS than or EQUAL to 8.  Partial reversal dose:  0.1 mg titrated q 2 minutes for Analgesia Side Effects Monitoring Sedation Level of 3 (frequently drowsy, arousable, drifts to sleep during conversation).Full reversal dose:  0.4 mg bolus for Analgesia Side Effects Monitoring Sedation Level of 4 (somnolent, minimal or no response to stimulation).  For ordered doses up to 2mg give IVP. Give each 0.4mg over 15 seconds in emergency situations. For non-emergent situations further dilute in 9mL of NS to facilitate titration of response.    Admin. Amount: 0.1-0.4 mg = 0.25-1 mL Conc: 0.4 mg/mL  Dispense Loc:  ADS 88B  Volume: 1 mL               ondansetron (ZOFRAN-ODT) ODT tab 4 " mg  Dose: 4 mg  Freq: EVERY 6 HOURS PRN Route: PO  PRN Reasons: nausea,vomiting  Start: 05/24/18 0023   Admin Instructions: This is Step 1 of nausea and vomiting management.  If nausea not resolved in 15 minutes, go to Step 2 prochlorperazine (COMPAZINE). Do not push through foil backing. Peel back foil and gently remove. Place on tongue immediately. Administration with liquid unnecessary  With dry hands, peel back foil backing and gently remove tablet; do not push oral disintegrating tablet through foil backing; administer immediately on tongue and oral disintegrating tablet dissolves in seconds; then swallow with saliva; liquid not required.    Admin. Amount: 1 tablet (1 × 4 mg tablet)  Dispense Loc: [facility]                     Or  ondansetron (ZOFRAN) injection 4 mg  Dose: 4 mg  Freq: EVERY 6 HOURS PRN Route: IV  PRN Reasons: nausea,vomiting  Start: 05/24/18 0023   Admin Instructions: This is Step 1 of nausea and vomiting management.  If nausea not resolved in 15 minutes, go to Step 2 prochlorperazine (COMPAZINE).  Irritant. For ordered doses up to 4 mg, give IV Push undiluted over 2-5 minutes.    Admin. Amount: 4 mg = 2 mL Conc: 4 mg/2 mL  Last Admin: 05/25/18 1056  Dispense Loc: [facility]  Infused Over: 2-5 Minutes  Volume: 2 mL      1056 (4 mg)-Given                polyethylene glycol (MIRALAX/GLYCOLAX) Packet 17 g  Dose: 17 g  Freq: 2 TIMES DAILY PRN Route: PO  PRN Comment: constipation  Start: 05/24/18 0023   Admin Instructions: If the patient has multiple PO bowel stimulant agents ordered PRN, offer in the following order per policy.  Move to the next available step if the earlier step is ineffective.  Step 1 - senna; Step 2 - bisacodyl; Step 3 - milk of magnesia; Step 4 - polyethylene glycol; Step 5 - magnesium citrate.  1 Packet = 17 grams. Mixed prescribed dose in 8 ounces of water. Follow with 8 oz. of water.    Admin. Amount: 17 g  Last Admin: 05/24/18 1448  Dispense Loc: [facility]     1448 (17  g)-Given                 QUEtiapine (SEROquel) tablet 25 mg  Dose: 25 mg  Freq: 2 TIMES DAILY Route: PO  Start: 05/28/18 1000   Admin Instructions: Also see prn    Admin. Amount: 1 tablet (1 × 25 mg tablet)  Last Admin: 05/30/18 0808  Dispense Loc:  ADS 88B         (1017)-Not Given [C]       2019 (25 mg)-Given        0840 (25 mg)-Given       2036 (25 mg)-Given        0808 (25 mg)-Given       [ ] 2100           senna-docusate (SENOKOT-S;PERICOLACE) 8.6-50 MG per tablet 1 tablet  Dose: 1 tablet  Freq: 2 TIMES DAILY PRN Route: PO  PRN Reason: constipation  Start: 05/24/18 0023   Admin Instructions: If no bowel movement in 24 hours, increase to 2 tablets PO.  Hold for loose stools.  This is the first step of a three step constipation treatment.    Admin. Amount: 1 tablet  Last Admin: 05/24/18 1300  Dispense Loc: [facility] 88B     1300 (1 tablet)-Given                Or  senna-docusate (SENOKOT-S;PERICOLACE) 8.6-50 MG per tablet 2 tablet  Dose: 2 tablet  Freq: 2 TIMES DAILY PRN Route: PO  PRN Reason: constipation  Start: 05/24/18 0023   Admin Instructions: Hold for loose stools.  This is the first step of a three step constipation treatment.    Admin. Amount: 2 tablet  Dispense Loc: [facility] 88B                      sodium chloride (PF) 0.9% PF flush 3 mL  Dose: 3 mL  Freq: EVERY 8 HOURS Route: IK  Start: 05/24/18 0023   Admin Instructions: And Q1H PRN, to lock peripheral IV dormant line.    Admin. Amount: 3 mL  Last Admin: 05/28/18 0829  Dispense Loc: [facility] Floor Stock  Volume: 3 mL     0234 (3 mL)-Given       (0814)-Not Given       (1908)-Not Given        (0026)-Not Given       (0747)-Not Given       (1635)-Not Given       (2353)-Not Given        0852 (3 mL)-Given       (1537)-Not Given        0250 (3 mL)-Given       0900 (3 mL)-Given       1709 (3 mL)-Given       2348 (3 mL)-Given        0829 (3 mL)-Given       (1630)-Not Given        (0222)-Not Given       (0858)-Not Given              (2340)-Not Given        [ ] 0830        [ ] 1630           sodium chloride (PF) 0.9% PF flush 3 mL  Dose: 3 mL  Freq: EVERY 1 HOUR PRN Route: IK  PRN Reason: line flush  PRN Comment: for peripheral IV flush post IV meds  Start: 18 0023   Admin. Amount: 3 mL  Dispense Loc: [facility] Stock  Volume: 3 mL              Future Medications  Medications 18       bisacodyl (DULCOLAX) Suppository 10 mg  Dose: 10 mg  Freq: ONCE PRN Route: RE  PRN Reason: other  PRN Comment: for no bowel movement for 72 hours  Start: 18 0000   Admin Instructions: Give only after rectal check for impacted stool.    Admin. Amount: 1 suppository (1 × 10 mg suppository)  Dispense Loc:  ADS 88B  Administrations Remainin              Completed Medications  Medications 18         Dose: 12.5 mg  Freq: ONCE Route: PO  Start: 18 1245   End: 18 1253   Admin. Amount: 1 half-tab (1 × 12.5 mg half-tab)  Last Admin: 18 1253  Dispense Loc:  ADS 88B  Administrations Remainin        1253 (12.5 mg)-Given             Discontinued Medications  Medications 18         Dose: 400 mg  Freq: 2 TIMES DAILY Route: PO  Indications Comment: prophylaxis  Start: 183   End: 18 1044   Admin. Amount: 2 capsule (2 × 200 mg capsule)  Last Admin: 18 0839  Dispense Loc:  [facility] 88B     0232 (400 mg)-Given              2038 (400 mg)-Given        0916 (400 mg)-Given       2142 (400 mg)-Given        0853 (400 mg)-Given       1947 (400 mg)-Given               0900 (400 mg)-Given       7 (400 mg)-Given        0828 (400 mg)-Given       2019 (400 mg)-Given        0839 (400 mg)-Given       1044-Med Discontinued          Dose: 10 mg  Freq: DAILY Route: PO  Start: 18   End: 18   Admin Instructions: Hold it if SBP<100mmHg    Admin. Amount: 1 tablet (1 × 10 mg  tablet)  Last Admin: 05/29/18 0839  Dispense Loc: [facility]     0859 (10 mg)-Given        0916 (10 mg)-Given        0853 (10 mg)-Given        0901 (10 mg)-Given        0829 (10 mg)-Given        0839 (10 mg)-Given       1044-Med Discontinued          Dose: 325 mg  Freq: DAILY Route: PO  Start: 05/24/18 0900   End: 05/29/18 1044   Admin. Amount: 1 tablet (1 × 325 mg tablet)  Last Admin: 05/29/18 0839  Dispense Loc: [facility]     0859 (325 mg)-Given        0916 (325 mg)-Given        0853 (325 mg)-Given        0901 (325 mg)-Given        0828 (325 mg)-Given        0839 (325 mg)-Given       1044-Med Discontinued          Dose: 1 g  Freq: EVERY 24 HOURS Route: IV  Indications of Use: URINARY TRACT INFECTION  Start: 05/29/18 2000   End: 05/29/18 1422   Admin. Amount: 1 g  Dispense Loc: [facility]  Infused Over: 15-30 Minutes  Volume: 10 mL          1422-Med Discontinued          Dose: 1 g  Freq: EVERY 24 HOURS Route: IV  Indications of Use: URINARY TRACT INFECTION  Start: 05/28/18 2000   End: 05/29/18 0959   Admin. Amount: 1 g  Last Admin: 05/28/18 2018  Dispense Loc: [facility]  Infused Over: 15-30 Minutes  Volume: 10 mL         2018 (1 g)-New Bag        0959-Med Discontinued          Dose: 1,000 Units  Freq: DAILY Route: PO  Start: 05/24/18 0900   End: 05/29/18 1044   Admin. Amount: 1 tablet (1 × 1,000 Units tablet)  Last Admin: 05/29/18 0840  Dispense Loc: [facility]     0859 (1,000 Units)-Given        0916 (1,000 Units)-Given        0853 (1,000 Units)-Given        0901 (1,000 Units)-Given        0828 (1,000 Units)-Given        0840 (1,000 Units)-Given       1044-Med Discontinued          Dose: 325 mg  Freq: DAILY WITH BREAKFAST Route: PO  Start: 05/24/18 0900   End: 05/29/18 1044   Admin Instructions: Absorbed best on an empty stomach. If stomach upset occurs, can take with meals.    Admin. Amount: 1 tablet (1 × 325 mg tablet)  Last Admin: 05/29/18 0839  Dispense Loc:  [facility] 88B     0859 (325 mg)-Given        0916  (325 mg)-Given        0853 (325 mg)-Given        0901 (325 mg)-Given        0828 (325 mg)-Given        0839 (325 mg)-Given       1044-Med Discontinued          Dose: 10 mg  Freq: EVERY 4 HOURS PRN Route: IV  PRN Reason: high blood pressure  PRN Comment: give for SBP > 180  Start: 05/24/18 0023   End: 05/29/18 1044   Admin Instructions: For ordered doses up to 40 mg, give IV Push undiluted over 1 minute.    Admin. Amount: 10 mg = 0.5 mL Conc: 20 mg/mL  Dispense Loc:  [facility] 88B  Volume: 0.5 mL          1044-Med Discontinued          Dose: 50 mg  Freq: DAILY Route: PO  Start: 05/24/18 0900   End: 05/29/18 1044   Admin Instructions: DO NOT CRUSH. Tablet may be split in half along score line.  Hold it if SBP<100 or HR<55    Admin. Amount: 1 tablet (1 × 50 mg tablet)  Last Admin: 05/29/18 0839  Dispense Loc: [facility] 88B     0837 (50 mg)-Given        0916 (50 mg)-Given        0853 (50 mg)-Given        0901 (50 mg)-Given        0828 (50 mg)-Given        0839 (50 mg)-Given       1044-Med Discontinued          Dose: 12.5 mg  Freq: 2 TIMES DAILY PRN Route: PO  PRN Comment: hallucinations  Start: 05/27/18 1232   End: 05/29/18 1044   Admin. Amount: 1 half-tab (1 × 12.5 mg half-tab)  Last Admin: 05/29/18 0937  Dispense Loc: [facility] 88B         0310 (12.5 mg)-Given       0915 (12.5 mg)-Given        0453 (12.5 mg)-Given       0937 (12.5 mg)-Given       1044-Med Discontinued          Rate: 100 mL/hr   Freq: CONTINUOUS Route: IV  Start: 05/28/18 1000   End: 05/28/18 1959   Last Admin: 05/28/18 1837  Dispense Loc: [facility] Floor Stock  Volume: 1,000 mL         1037 ( )-New Bag       1837 ( )-New Bag       1959-Med Discontinued      Medications 05/24/18 05/25/18 05/26/18 05/27/18 05/28/18 05/29/18 05/30/18

## 2018-05-23 NOTE — IP AVS SNAPSHOT
` ` Patient Information     Patient Name Sex     Roc Medellin ([id number]) Male 1926       Room Bed    70 2129-85      Patient Demographics     Address Phone    [address]   [facility] [address] [phone] (Home)  [phone] (Mobile)      Patient Ethnicity & Race     Ethnic Group Patient Race    American White      Emergency Contact(s)     Name Relation Home Work Mobile    [place] CASTREJON) Relative   [phone]    Elfego Medellin Brother [phone] NONE NONE    BAM MEDELLIN Son [phone]    TeshaLenyn (Bam's wife-if Bam not available) Other   [phone]      Documents on File        Status Date Received Description       Documents for the Patient    Privacy Notice - [facility] Received 08     Face Sheet Received () 09     Face Sheet Received () 07/05/10     External Medication Information Consent Accepted () 07/05/10     Patient ID Received 18     Consent for Services - Hospital/Clinic Received () 11     HIM LISA Authorization   release from patient 3/8/11    External Medication Information Consent Accepted () 12     Consent for Services - Hospital/Clinic Received () 12     Consent to Communicate Received () 12     Consent for EHR Access  13 Copied from existing Consent for services - C/HOD collected on 2012    [facility] Specified Other       Consent for Services - Hospital/Clinic Received () 13     Consent for EHR Access Received 13     External Medication Information Consent Accepted 13     Consent to Communicate Received 13     Insurance Card Received 18 [employer]    Insurance Card Received 18 Medicare    Consent for Services - Hospital/Clinic Received () 14     Consent for Services - Hospital/Clinic Received () 07/10/15     Consent for Services - Hospital/Clinic Received  () 07/10/15     Consent for Services/Privacy Notice - Hospital/Clinic Received () 16     Advance Directives and Living Will Received 16 POLST 2016    Consent for Services/Privacy Notice - Hospital/Clinic Received () 17     Business/Insurance/Care Coordination/Health Form - Patient  17 MEDICA-CYCLOPHOSPHAMIDE CAPSULE DENIED-2017    Physical Therapy Certification Received 17 through 17    Business/Insurance/Care Coordination/Health Form - Patient  17 [place] DEPARTMENT OF PUBLIC SAFETY APPLICATION FOR DISABILITY PARKING CERTIFICATE-17    Business/Insurance/Care Coordination/Health Form - Patient  [facility], 17    Care Everywhere Prospective Auth Received 10/18/17     Consent for Services/Privacy Notice - Hospital/Clinic Received 18     Advance Directives and Living Will Received 18 POLST 18    Consent to Communicate  18 AUTHORIZATION TO DISCUSS PROTECTED  HEALTH INFORMATION 18    Consent for Services - Hospital and Clinic       HIE Auth Received 18     Consent for Services - Hospital and Clinic Received 18     Business/Insurance/Care Coordination/Health Form - Patient  18 MEDICA APPROVAL OF  REVLIMID CAPSULE 18 - 21    Insurance Card  (Deleted) 08     Insurance Card  (Deleted) 07/05/10     Insurance Card Received (Deleted) 11     Insurance Card Received (Deleted) 10/11/12     Insurance Card Received (Deleted) 10/11/12     Advance Directives and Living Will Not Received (Deleted)  Invalid - to be deleted       Documents for the Encounter    CMS IM for Patient Signature Received 18     EMS/Ambulance Record  18 [facility]      Admission Information     Attending Provider Admitting Provider Admission Type Admission Date/Time    Chante Blas MD Nistor, Doina Simona, MD Emergency 18   2136    Discharge Date Hospital Service Auth/Cert Status Service Area     Hospitalist Incomplete [facility]    Unit Room/Bed Admission Status        88 ONCOLOGY 8829/8829-01 Admission (Confirmed)       Admission     Complaint    Fever      Hospital Account     Name Acct ID Class Status Primary Coverage    Roc Parkinson [id number] Inpatient Open MEDICARE - MEDICARE FOR HB SUPPLEMENT            Guarantor Account (for Hospital Account #[id number])     Name Relation to Pt Service Area Active? Acct Type    Roc Parkinson  FCS Yes Personal/Family    Address Phone          [address] [place] [address] [phone](H)              Coverage Information (for Hospital Account #[id number])     1. MEDICARE/MEDICARE FOR HB SUPPLEMENT     F/O Payor/Plan Precert #    MEDICARE/MEDICARE FOR HB SUPPLEMENT     Subscriber Subscriber #    Roc Parkinson [id number]    Address Phone    [employer] CLAIMS  [address]  [place] [address] [phone]          2. MEDICA/MEDICA PRIME SOLUTION     F/O Payor/Plan Precert #    MEDICA/MEDICA PRIME SOLUTION     Subscriber Subscriber #    Roc Parkinson [id number]    Address Phone    [address] [address]  [place], [place] [id number] [phone]

## 2018-05-23 NOTE — IP AVS SNAPSHOT
` [place] [address] ONCOLOGY: [phone]                 INTERAGENCY TRANSFER FORM - NOTES (H&P, Discharge Summary, Consults, Procedures, Therapies)   2018                    Hospital Admission Date: 2018  JACOB MEDELLIN   : 1926  Sex: Male        Patient PCP Information     Provider PCP Type    Dickson Reich MD General         History & Physicals      H&P signed by Chante Blas MD at 2018  7:16 PM      Author:  Chante Blas MD Service:  Hospitalist Author Type:  Physician    Filed:  2018  7:16 PM Date of Service:  2018  1:47 AM Creation Time:  2018  2:41 AM    Status:  Signed :  Chante Blas MD (Physician)         Admitted:     2018      DATE OF ADMISSION: 2018      PRIMARY CARE PHYSICIAN: Dickson Reich MD      PRIMARY ONCOLOGIST: Gretel Quinn MD      CHIEF COMPLAINT:  Generalized weakness.      HISTORY OF PRESENT ILLNESS:  History of present illness has been obtained partially from the patient, although he seems tired at the time of my examination and he seems to be a relatively poor historian.  I did discuss with ER attending, Dr. Phillips.  I also discussed with the patient's son who was present at the time of my examination and I reviewed his chart extensively.      Mr. Jacob Medellin is a very pleasant 89-year-old gentleman with complex past medical history of hypertension, dyslipidemia, paroxysmal atrial fibrillation (only on aspirin), type 2 diabetes mellitus (insulin-dependent), myelodysplastic syndrome, multiple myeloma followed by Dr. Quinn as outpatient, history of chronic kidney disease stage 3, hypothyroidism, pancytopenia, congestive heart failure with preserved ejection fraction, gastroesophageal reflux disease, history of recurrent UTI with a history of ESBL UTI, and history of prostate and bladder cancer who was brought in to the ER for evaluation of generalized weakness.      As mentioned  above, the patient does have multiple myeloma.  He follows with Dr. Quinn of Oncology.  He has been on multiple regimens of chemotherapy in the past.  On last Monday (3 days ago), he was started on Revlimid, which he takes 1 pill daily.  The patient had a recent admission to [facility] from 05/07/2018-05/10/2018 for generalized weakness.  He was found to have ESBL E. coli UTI.  He was followed by Infectious Disease specialist, treated with ertapenem in the hospital and discharged on ertapenem for 10 more days through [facility], which I think he finished on 05/20/2018.  He was seen by his primary care physician on 05/22/2018 because of reported foul-smelling urine.  He had his urine checked.  UA was slightly abnormal, white blood cells 5-10, trace leukocyte esterase, but urine culture returned back with no growth.      The patient states that he is having generalized weakness for a long time now, but this morning he felt much weaker than his baseline, to the point that he could not stand up, which was new for him.  He also reports that in the past he had falls, but he did not have any fall today or yesterday.  Upon further questioning, he is not sure if he had a fever at home, but he did feel hot alternating with feeling cold.  Upon further questioning, he denies any chest pain, no shortness of breath, no palpitations, no nausea, no vomiting, no diarrhea, no constipation, no headache, no dysuria, no leg swelling.  He states that his appetite is okay.  He denies any coughing.      In the ER, he was seen by Dr. Phillips.  His initial vitals showed that he was having a fever with temperature of 103.1.  His heart rate was 94, respiratory rate 20, oxygen saturation 93%-96% on room air, and blood pressure was 179/77.  He did have basic blood work which showed a BMP with elevated creatinine of 1.48.  Normal sodium, potassium, chloride.  Lactic acid was mildly elevated at 2.6.  Glucose was 274.  CBC with white  blood cells of 3, hemoglobin 8.6, hematocrit 26.3 and platelet count of 57.  UA was again mildly abnormal, white blood cells of 29, moderate leukocyte esterase, few bacteria.  Urine culture and blood culture were sent to the lab, pending at this time.  His chest x-ray showed no confluent infiltrates or definite mass lesions.      In ER, the patient was given 1 bolus of normal saline 1 liter, 1 dose of Tylenol 975 mg p.o., and 1 dose of ertapenem given his history of ESBL E. coli UTI, and Hospitalist Service was called regarding the admission.      PAST MEDICAL HISTORY:   1.  Hypertension.   2.  Dyslipidemia.   3.  History of coronary artery disease.   4.  Multiple myeloma, being followed by Dr. Quinn.  He has been on different regimens of chemotherapy in the past.  Most recently, he was started on Revlimid last Monday.   5.  Chemotherapy-induced pancytopenia.   6.  Paroxysmal atrial fibrillation, not on anticoagulation with warfarin due to history of GI bleeding.  He is on aspirin.   7.  History of coronary artery disease.   8.  Type 2 diabetes mellitus, insulin-dependent.   9.  Chronic kidney disease stage 3.   10.  Hypothyroidism.   11.  History of recurrent UTIs with history of ESBL UTI.  Most recent admission to [facility] from 05/07/2018-05/10/2018 for ESBL UTI.  He was treated with ertapenem as per ID's recommendations.   12.  History of gastroesophageal reflux disease.   13.  History of heart failure with preserved ejection fraction of 55% to 60% on echocardiogram in March 2018.   14.  Vitamin D deficiency.   15.  Irritable bowel syndrome.   16.  Remote history of prostate and bladder cancer.   17.  Osteoarthritis.      PAST SURGICAL HISTORY:[DN1.1]   Past Surgical History:   Procedure Laterality Date     ARTHROPLASTY KNEE  11/5/2012    Procedure: ARTHROPLASTY KNEE;  RIGHT TOTAL KNEE ARTHROPLASTY (SMITH & NEPHEW)^;  Surgeon: Dickson Schulte MD;  Location: [facility] OR     BIOPSY      bladder      COLONOSCOPY  2007     COLONOSCOPY N/A 11/15/2016    Procedure: COMBINED COLONOSCOPY, SINGLE OR MULTIPLE BIOPSY/POLYPECTOMY BY BIOPSY;  Surgeon: Kenneth Fulton MD;  Location:  GI     GENITOURINARY SURGERY      TURP x2 and bladder scraping     GI SURGERY      anal fistula     ORTHOPEDIC SURGERY      lt knee in nov.2009     PHACOEMULSIFICATION CLEAR CORNEA WITH STANDARD INTRAOCULAR LENS IMPLANT Left 10/25/2017    Procedure: PHACOEMULSIFICATION CLEAR CORNEA WITH STANDARD INTRAOCULAR LENS IMPLANT;  LEFT EYE PHACOEMULSIFICATION CLEAR CORNEA WITH STANDARD INTRAOCULAR LENS IMPLANT ;  Surgeon: Shady Haider MD;  Location:  [facility]     PHACOEMULSIFICATION CLEAR CORNEA WITH STANDARD INTRAOCULAR LENS IMPLANT Right 11/1/2017    Procedure: PHACOEMULSIFICATION CLEAR CORNEA WITH STANDARD INTRAOCULAR LENS IMPLANT;  RIGHT EYE PHACOEMULSIFICATION CLEAR CORNEA WITH STANDARD INTRAOCULAR LENS IMPLANT;  Surgeon: Shady Haider MD;  Location:  [facility]     SOFT TISSUE SURGERY      melanoma[DN1.2]        SOCIAL HISTORY:  The patient denies alcohol drinking.  He denies smoking.  He denies illicit drug abuse.      FAMILY HISTORY:  Reviewed with the patient and is noncontributory to the current admission.      PRIOR TO ADMISSION MEDICATIONS:  Needs to be verified by the pharmacy.  Apparently he is on    Current Outpatient Prescriptions on File Prior to Encounter:  ACYCLOVIR PO Take 400 mg by mouth 2 times daily Oncology to decide when stop date will be   amLODIPine (NORVASC) 10 MG tablet Take 1 tablet (10 mg) by mouth daily   Ascorbic Acid (VITAMIN C PO) Take 500 mg by mouth daily    dexamethasone (DECADRON) 4 MG tablet Take 10 tablets (40 mg) by mouth once a week Once a week with food on Days 1,8,15 and 22.   ferrous sulfate (IRON) 325 (65 FE) MG tablet Take 325 mg by mouth daily (with breakfast)   furosemide (LASIX) 20 MG tablet TAKE 1 TABLET BY MOUTH EVERY DAY   gemfibrozil (LOPID) 600 MG tablet TAKE 1 TABLET BY MOUTH TWICE DAILY    insulin aspart (NOVOLOG) 100 UNITS/ML injection Inject 5 Units Subcutaneous daily (with dinner) (Patient taking differently: Inject 9 Units Subcutaneous daily (with dinner) )   INSULIN ASPART SC Inject Subcutaneous 4 times daily Sliding scale:140-175 1 oyei210-275 2 vmwdo332-753 3 agscf155-218 4 [id number] 5 [id number] 6 [id number] 7 [id number] 8 units>420 9 units   INSULIN ASPART SC Inject 13 Units Subcutaneous every morning    INSULIN ASPART SC Inject 11 Units Subcutaneous daily (before lunch)    INSULIN ASPART SC Inject 5 Units Subcutaneous At Bedtime Inject 5 unit subcutaneously at bedtime for Diabetes II Ok to use Humalog insulin with same order details   insulin detemir (LEVEMIR FLEXPEN/FLEXTOUCH) 100 UNIT/ML injection Inject 7 Units Subcutaneous every morning (before breakfast) (Patient taking differently: Inject 10 Units Subcutaneous every morning (before breakfast) )   LENalidomide (REVLIMID) 5 MG CAPS capsule CHEMO Take 1 capsule (5 mg) by mouth daily for 21 days Take on Days 1 through 21 of 28-day cycle.   Levothyroxine Sodium (SYNTHROID PO) Take 50 mcg by mouth twice a week Monday and Friday   LEVOTHYROXINE SODIUM PO Take 25 mcg by mouth five times a week Tuesday, Wednesday, Thursday, Saturday, Sunday   LISINOPRIL PO Take 15 mg by mouth daily   polyethylene glycol (MIRALAX/GLYCOLAX) Packet Take 17 g by mouth 2 times daily as needed (constipation)   Selenium 200 MCG TABS Take 100 mcg by mouth daily. Pt takes one half tab of the 200 mcg daily    VITAMIN D, CHOLECALCIFEROL, PO Take 1,000 Units by mouth daily    B-D U/F 31G X 8 MM insulin pen needle USE 5 PEN NEEDLES PER DAY OR AS DIRECTED   Blood Glucose Monitoring Suppl MISC 3 times daily (before meals)    FREESTYLE LITE test strip USE TO TEST FOUR TIMES DAILY   prochlorperazine (COMPAZINE) 5 MG tablet Take 1 tablet (5 mg) by mouth every 6 hours as needed (Nausea/Vomiting)   [DISCONTINUED] cyclophosphamide (CYTOXAN) 50 MG capsule CHEMO Take 6  capsules (300 mg) by mouth once a week   [DISCONTINUED] metoprolol succinate (TOPROL-XL) 50 MG 24 hr tablet Take 1 tablet (50 mg) by mouth daily        ALLERGIES:  NO KNOWN DRUG ALLERGIES.      REVIEW OF SYSTEMS:  A 10-point review of systems was conducted and it was negative except for pertinent positives mentioned in history of present illness.      PHYSICAL EXAMINATION:   VITAL SIGNS:  Blood pressure is 171/76, heart rate 82, respiratory rate 16, oxygen saturation 97%-98% on room air, T-max in .1.   GENERAL:  The patient is awake, alert, no acute distress at the time of my examination.  He looks tired and he is hard of hearing.   HEENT:  Normocephalic, atraumatic.  Pupils are equally round and reactive to light.  Oral mucosa is mildly dry.   NECK:  Supple.  No cervical lymphadenopathy, no thyromegaly.   CHEST:  There is bilateral air entry with very mild crackles at the bases, no wheezing, no rales.   CARDIOVASCULAR:  There is normal S1, S2, regular rate and rhythm.  No murmurs, no rubs.   ABDOMEN:  Soft, nontender, nondistended.  Bowel sounds are present.   EXTREMITIES:  There is no leg swelling, no calf tenderness, 2+ peripheral pulses are palpable.   SKIN:  Intact, no rash, no cyanosis.   NEUROLOGIC:  The patient is awake, alert and oriented x 3.  No focal neurological deficits.  Generalized weakness noted.   PSYCHIATRIC:  Normal mood, normal affect.   MUSCULOSKELETAL:  He moves all extremities freely.  There are no obvious joint deformities.      LABORATORY DATA:  BMP with sodium 138, potassium 4.3, chloride 106, bicarbonate 22, BUN 37, creatinine 1.38, calcium is 9.1, anion gap of 10.  Lactic acid 2.6.  Glucose 274.  White blood cells 3, hemoglobin 8.6, hematocrit 26.3, platelet count 57.  UA mildly abnormal with white blood cells of 29, moderate leukocyte esterase, few bacteria. Urine cultures and blood cultures are pending at this time.      IMAGING:  Chest x-ray does not show any acute infiltrates  or masses.      ASSESSMENT:  Mr. Roc Parkinson is a very pleasant 89-year-old gentleman with complex past medical history of multiple myeloma, hypertension, dyslipidemia, coronary artery disease, congestive heart failure with preserved ejection fraction, diabetes mellitus type 2, paroxysmal atrial fibrillation on aspirin, hypothyroidism, vitamin D deficiency, chemotherapy-induced pancytopenia, history of recurrent UTI with a history of ESBL E. coli UTI, gastroesophageal reflux disease, and remote history of prostate and bladder cancer who was brought in for evaluation of generalized weakness.  He was found to be febrile, temperature 103.1 in ER.      PLAN:   1.  Sepsis, possibly due to urinary tract infection.  He presented with generalized weakness, fever of 103.1 in the ER, lactic acid elevated at 2.6.  He does have a history of recurrent UTIs and the last admission to [facility] was for ESBL E. coli UTI, for which he was treated with IV ertapenem in the hospital and discharged on 10 more days of ertapenem through [facility], which I think he finished on 05/20/2018.  He had another UA checked a couple of days ago in PMD office, which was abnormal, although urine culture was negative.  At this time, his UA is abnormal again with white blood cells 29, moderate leukocyte esterase.  I cannot find any other source of infection.  His chest x-ray is negative.  He does have chronic pancytopenia which seems to be multifactorial from his multiple myeloma/myelodysplastic syndrome and related to chemotherapy as well.  He was actually recently started on Revlimid on last Monday.  Given his history of bladder and prostate cancer, he was seen by Urology during his last hospitalization.  Apparently he declined cystoscopy at that time. Urine cytology was negative for malignancy.  The plan was to follow up with Urology as outpatient in 4-6 weeks.  He received 1 dose of ertapenem in emergency room.  Will continue  with ertapenem for now, follow up urine cultures and blood cultures.  Will monitor fever curve and white blood cell trend.  Will deescalate antibiotic therapy depending on cultures.  If urine culture positive again for ESBL, likely will need to be seen by ID consultant as well.   2.  Generalized weakness, likely multifactorial due to current sepsis in the setting of multiple myeloma.  The patient reported falls at home, although not today or yesterday.  The patient will be seen by PT and OT.  Fall precautions in place.   3.  Multiple myeloma/myelodysplastic syndrome.  He is being followed by Dr. Quinn of Hematology/Oncology.  He has been on multiple chemotherapy regimens.  As per the last notes, he was recently started on Revlimid daily, planned for 21-day course, then 7 days off, and he also takes dexamethasone weekly.  We will hold Revlimid at this time and Hem/Onc consult has been requested.   4.  Pancytopenia, which seems to be multifactorial due to his multiple myeloma/myelodysplastic syndrome and also chemotherapy-induced pancytopenia.  White blood cells 3, hemoglobin 8.6, platelet count 57.  The numbers are close to his prior numbers.  He does not have any evidence of active bleeding, no need for blood transfusion products at this time.  As mentioned above, we will ask for Hematology/Oncology consult.  Will closely monitor his CBC.   5.  Hypertension.  His blood pressure seemed to be on higher side upon arrival in ER.  Later on, blood pressure was better controlled.  The plan for now is to continue his prior to admission Norvasc 10 mg p.o. daily and metoprolol succinate 50 mg p.o. daily.  I am holding his prior to admission lisinopril for now.  Hydralazine IV p.r.n. has been ordered for elevated blood pressures.   6.  History of dyslipidemia.  We will continue his prior to admission Lopid.   7.  History of diastolic CHF with preserved ejection fraction of 55%-60%.  He seems euvolemic at this time.  At home, he  has maintained on Lasix 20 mg p.o. daily.  Given his sepsis and fever, I am holding his Lasix for now.  I am going to mildly hydrate him.  Will closely monitor his fluid status and if there is any evidence of fluid overload, we should stop IV fluids and resume his Lasix.   8.  Type 2 diabetes mellitus, insulin-dependent.  His most recent hemoglobin A1c was 5.5 in March 2018.  Prior to admission, he had been maintained on insulin Levemir 17 units subcutaneously every morning, insulin aspart 13 units every morning, 11 units with lunch and 10 units with dinner.  The plan for now is to continue his long-acting Levemir and will order insulin aspart 10 units with breakfast and lunch, and 5 units with supper.  Insulin sliding scale has been ordered as well.   9.  Hypothyroidism.  His most recent TSH on 04/25/2018 was high at 10.08, although free T4 was normal.  Apparently his primary care physician adjusted his levothyroxine doses at that time.  We will plan to continue his prior to admission levothyroxine 50 mcg twice a week on Monday and Friday and 25 mcg p.o. on the rest of the days of the week, and we will repeat thyroid function test.   10.  Acute kidney injury on chronic kidney disease.  His baseline creatinine seems to be between 0.9-1.2.  His creatinine today is slightly higher than his baseline at 1.48, so plan is to hold his prior to admission Lasix. I am holding his prior to admission lisinopril.  I am going to mildly hydrate him with normal saline at 100 mL per hour and will repeat BMP in the morning.  Will avoid nephrotoxic drugs.   11.  Paroxysmal atrial fibrillation, not on chronic anticoagulation.  Apparently his anticoagulation was discontinued 1 year ago due to GI bleeding.  He has been maintained on aspirin 325 mg p.o. daily.  His heart rate seems to be well controlled on his Toprol-XL 50 mg p.o. daily, which will be continued during this hospitalization.   12.  History of vitamin D deficiency.  Continue  prior to admission vitamin D.   13.  DVT prophylaxis:  Pneumatic compression device.  No heparin or Lovenox given his thrombocytopenia.      CODE STATUS:  DISCUSSED WITH THE PATIENT AND PATIENT IS DNR/DNI.      DISPOSITION:  Admit inpatient.  Anticipate at least 2 days of hospitalization.         CHANTE ARANA MD             D: 2018   T: 2018   MT:       Name:     JACOB MEDELLIN   MRN:      [id number]        Account:      [id number]   :      1926        Admitted:     2018                   Document: [id number][DN1.1]         Revision History        User Key Date/Time User Provider Type Action    > DN1.1 2018  7:16 PM Chante Arana MD Physician Sign     DN1.2 2018  7:15 PM Chante Arana MD Physician      [N/A] 2018  2:41 AM Chante Arana MD Physician Edit                     Discharge Summaries      Discharge Summaries by Cecilio Medeiros MD at 2018  7:58 AM     Author:  Cecilio Medeiros MD Service:  Hospitalist Author Type:  Physician    Filed:  2018  7:59 AM Date of Service:  2018  7:58 AM Creation Time:  2018  7:57 AM    Status:  Signed :  Cecilio Medeiros MD (Physician)         [facility]  Discharge Summary        Jacob Medellin MRN# [id number]   YOB: 1926 Age: 89 year old     Date of Admission: 2018  Date of Discharge: 2018  Admitting Physician: Chante Arana MD  Discharge Physician: Cecilio Medeiros MD     Primary Provider: Dickson Reich  Primary Care Physician Phone Number: [phone]         Discharge Diagnoses:   1. Fevers with weakness with concern for sepsis - suspect due to chemo (Revlimid).  2. AMS with hallucinations, suspect acute delirium vs toxic encephalopathy.  3. Multiple myeloma/myelodysplastic syndrome.   4. Pancytopenia suspect due to above and chemotherapy.  5. Acute kidney injury stage 1 on chronic kidney disease, suspect  prerenal.  6. Weakness and deconditioning due to above.  7. Recent UTI with ESBL producing organism.        Other Chronic Medical Problems:      1. DM2 w/o complications.  2. Hypertension (benign essential).   3. History of diastolic CHF with preserved ejection fraction of 55-60%.   4. Dyslipidemia.    5. Hypothyroidism.    6. Paroxysmal atrial fibrillation.         Allergies:       No Known Allergies        Discharge Medications:        Current Discharge Medication List      START taking these medications    Details   acetaminophen (TYLENOL) 325 MG tablet Take 2 tablets (650 mg) by mouth every 4 hours as needed for mild pain or fever  Qty: 100 tablet, Refills: 1    Associated Diagnoses: Multiple myeloma not having achieved remission (H)      acetaminophen (TYLENOL) 650 MG Suppository Place 1 suppository (650 mg) rectally every 4 hours as needed for fever or mild pain (Do not exceed 4000 mg total acetaminophen per day.) Unwrap prior to insertion.  Qty: 12 suppository, Refills: 1    Associated Diagnoses: Multiple myeloma not having achieved remission (H)      atropine 1 % ophthalmic solution Take 2 drops by mouth, place under tongue or place inside cheek every 2 hours as needed for other (terminal respiratory secretions) Not for ophthalmic use.  Qty: 5 mL, Refills: 1    Associated Diagnoses: Multiple myeloma not having achieved remission (H)      bisacodyl (DULCOLAX) 10 MG Suppository Unwrap and insert 1 suppository rectally twice daily as needed for constipation.  Qty: 12 suppository, Refills: 1    Associated Diagnoses: Multiple myeloma not having achieved remission (H)      haloperidol (HALDOL) 2 MG/ML (HIGH CONC) solution Take 0.5 mLs (1 mg) by mouth, place under tongue or insert rectally every 6 hours as needed for agitation (nausea)  Qty: 30 mL, Refills: 1    Associated Diagnoses: Multiple myeloma not having achieved remission (H)      HYDROmorphone, HIGH CONC, (DILAUDID) 10 mg/mL LIQD oral Take 0.2 mLs (2 mg)  by mouth or place under tongue every 2 hours as needed for moderate to severe pain (and/or  shortness of breath)  Qty: 30 mL, Refills: 0    Associated Diagnoses: Multiple myeloma not having achieved remission (H)      MEDICATION INSTRUCTION If care facility cannot accept or use ranges, facility is instructed to use lower end of dosing range    Associated Diagnoses: Multiple myeloma not having achieved remission (H)      QUEtiapine (SEROQUEL) 25 MG tablet Take 1 tablet (25 mg) by mouth 2 times daily  Qty: 28 tablet, Refills: 0    Associated Diagnoses: Delirium      sennosides (SENOKOT) 8.6 MG tablet Take 1 tablet by mouth 2 times daily as needed for constipation  Qty: 100 tablet, Refills: 1    Associated Diagnoses: Multiple myeloma not having achieved remission (H)         CONTINUE these medications which have CHANGED    Details   prochlorperazine (COMPAZINE) 5 MG tablet Take 1 tablet (5 mg) by mouth every 6 hours as needed (Nausea/Vomiting)  Qty: 30 tablet, Refills: 0    Associated Diagnoses: Multiple myeloma not having achieved remission (H)         CONTINUE these medications which have NOT CHANGED    Details   !! Levothyroxine Sodium (SYNTHROID PO) Take 50 mcg by mouth twice a week Monday and Friday      !! LEVOTHYROXINE SODIUM PO Take 25 mcg by mouth five times a week Tuesday, Wednesday, Thursday, Saturday, Sunday      polyethylene glycol (MIRALAX/GLYCOLAX) Packet Take 17 g by mouth 2 times daily as needed (constipation)  Qty: 7 packet    Associated Diagnoses: Slow transit constipation      B-D U/F 31G X 8 MM insulin pen needle USE 5 PEN NEEDLES PER DAY OR AS DIRECTED  Qty: 500 each, Refills: 1    Associated Diagnoses: Type 2 diabetes mellitus with stage 3 chronic kidney disease, with long-term current use of insulin (H)      Blood Glucose Monitoring Suppl MISC 3 times daily (before meals)       FREESTYLE LITE test strip USE TO TEST FOUR TIMES DAILY  Qty: 400 strip, Refills: 1    Associated Diagnoses:  Uncontrolled type 1 diabetes mellitus with stage 3 chronic kidney disease (H)       !! - Potential duplicate medications found. Please discuss with provider.      STOP taking these medications       ACYCLOVIR PO Comments:   Reason for Stopping:         amLODIPine (NORVASC) 10 MG tablet Comments:   Reason for Stopping:         Ascorbic Acid (VITAMIN C PO) Comments:   Reason for Stopping:         dexamethasone (DECADRON) 4 MG tablet Comments:   Reason for Stopping:         ferrous sulfate (IRON) 325 (65 FE) MG tablet Comments:   Reason for Stopping:         furosemide (LASIX) 20 MG tablet Comments:   Reason for Stopping:         gemfibrozil (LOPID) 600 MG tablet Comments:   Reason for Stopping:         insulin aspart (NOVOLOG) 100 UNITS/ML injection Comments:   Reason for Stopping:         INSULIN ASPART SC Comments:   Reason for Stopping:         INSULIN ASPART SC Comments:   Reason for Stopping:         INSULIN ASPART SC Comments:   Reason for Stopping:         INSULIN ASPART SC Comments:   Reason for Stopping:         insulin detemir (LEVEMIR FLEXPEN/FLEXTOUCH) 100 UNIT/ML injection Comments:   Reason for Stopping:         LENalidomide (REVLIMID) 5 MG CAPS capsule CHEMO Comments:   Reason for Stopping:         LISINOPRIL PO Comments:   Reason for Stopping:         Selenium 200 MCG TABS Comments:   Reason for Stopping:         VITAMIN D, CHOLECALCIFEROL, PO Comments:   Reason for Stopping:                   Discharge Instructions and Follow-Up:      Follow-up Appointments     Follow Up and recommended labs and tests       Admit to hospice.  Follow up with hospice providers.                          Consultations This Hospital Stay:      1. Hematology-Oncology ([place]).  2. Palliative Care.        Admission History:      Please see the H&P by Chante Blas MD on 5/23/2018 for complete details. Briefly, Mr. Roc Parkinson is a 89 year old male with medical history including multiple myeloma, DM2, HTN, CHF,  PAF, h/o prostate and bladder cancer, and h/o recurrent UTI's including recent ESBL producing producing E. Coli, who presented 5/23/2018 with weakness and found with fever and abnormal UA.        Problem Oriented Hospital Course:      1. Fevers with weakness with concern for sepsis - suspect due to chemo (Revlimid).  Presented 5/23 with generalized weakness and fever of 103.1; WBC low but not neutropenic; initial lactic acid of 2.6; UA mildly abnormal. CXR was negative for acute infiltrate. Hx of recurrent UTI with recent ESBL E. Coli and had completed a course of IV Ertapenem on 5/20/18 and was thus started empirically on ertapenem on 5/23. UC from 5/23 was ultimately negative. Given 1 dose vanco 5/25. Procalcitonin 0.84 (moderate risk) on 5/25. BC's so far negative as of 5/26. Of note, pt recently started on Revlimid (lenalidomide) on 5/21 which can have adverse reaction of fever. Seen by Oncology 5/24 and chemo held. CT CAP on 5/25 negative for signs of focal infection. Afebrile since am 5/25. Ertapenem stopped 5/26. Repeat UA 5/2 abnormal, but with 2 sq epithelial cells. Micro: BC's 5/23 NGTD. UC 5/23 negative. BC 5/25 NGTD. C. Diff toxin B 5/25 negative.     2. AMS with hallucinations, suspect acute delirium vs toxic encephalopathy.  Pt with hallucinations 5/27 but pt aware of them. Son 5/27 noted that he had been having hallucinations since PTA but worse on 5/27. ?side effect from ertapenem (stopped 5/26). Added PRN Seroquel 5/27. Still hallucinating/delirious 5/28. Scheduled Seroquel 5/28. UA 5/28 not impressive for infection.     3. Multiple myeloma/myelodysplastic syndrome.       Pancytopenia suspect due to above and chemotherapy.  He is being followed by Dr. Quinn of Hematology/Oncology. He has been on multiple chemotherapy regimens. As per the last notes, he was recently started on Revlimid daily on 5/21 and planned for 21-day course, then 7 days off, and he also was taking dexamethasone weekly. Seen by  Oncology 5/24 and Revlimid and dexamethasone held. After further d/w Dr. Villaseñor 5/26, no plans for further treatment recommended.     4. Acute kidney injury stage 1 on chronic kidney disease, suspect prerenal.  His baseline creatinine seems to be between 0.9-1.2. His creatinine was 1.48 on admission. Cr normalized 5/26.      5. Weakness and deconditioning due to above.     6. DM2 w/o complications.  [PTA: Levemir 17 units subcutaneously every morning; insulin aspart 13U every morning, 11U with lunch, 9U with dinner, 5U at HS.]  His most recent hemoglobin A1c was 5.5 in March 2018. Glucose trending low on 5/25 evening. Levemir and prandial aspart held 5/26. Glucose levels stable despite being off long acting insulin.     OVERALL, after further d/w Oncology and Palliative Care team, pt was transitioned to comfort cares on 5/29.            Code Status:      Comfort Care        Pending Results:      None.          Discharge Disposition:      Discharged to hospice.        Discharge Time:      Greater than 30 minutes.        Key Imaging Studies, Lab Findings and Procedures/Surgeries:        Results for orders placed or performed during the hospital encounter of 05/23/18   XR Chest 2 Views    Narrative    CHEST TWO VIEWS 5/23/2018 10:40 PM     HISTORY: Fever, chemotherapy patient.     COMPARISON: 3/25/2018.      Impression    IMPRESSION: Interval resolution of previously seen right pleural  effusion and associated infiltrate/atelectasis. A smaller left pleural  effusion on the prior exam has also resolved. No confluent infiltrates  or definite mass lesions. Heart size is difficult evaluate due to  rotation of the patient, but it is probably unchanged.    SHERYL AVALOS MD   CT Chest Abdomen Pelvis w/o Contrast    Narrative    CT CHEST, ABDOMEN, AND PELVIS WITHOUT CONTRAST 5/25/2018 12:21 PM     HISTORY: Persistent fever. Evaluate for possible source of infection.    COMPARISON: May 25, 2018    TECHNIQUE: Volumetric helical  acquisition of CT images from the lung  apices through the symphysis pubis without contrast. Radiation dose  for this scan was reduced using automated exposure control, adjustment  of the mA and/or kV according to patient size, or iterative  reconstruction technique.    FINDINGS:   Chest: Bilateral calcified pleural plaques noted which are nonspecific  but may be related to prior asbestos exposure. Minimal peripheral  fibrosis and/or atelectasis noted. Trace pleural fluid or pleural  thickening bilaterally. No pericardial effusion. Heart may be generous  in size. There are moderate atherosclerotic changes of the visualized  aorta and its branches. There is no evidence of aortic aneurysm.    Abdomen and pelvis: Spleen is enlarged measuring 18.1 cm. There is  cholelithiasis without evidence for cholecystitis. The liver, spleen,  adrenal glands, kidneys, and pancreas demonstrate no worrisome focal  lesion in the absence of intravenous contrast. 3 mm nonobstructing  stone in the inferior pole of the right kidney. Tiny simple cysts in  both kidneys. There are no dilated loops of small intestine or large  bowel to suggest ileus or obstruction. No free air or free fluid.    Survey of the visualized bony structures demonstrates no destructive  bony lesions.      Impression    IMPRESSION:  1. Marked splenomegaly at 18.1 cm.  2. Bilateral calcified pleural plaques which are nonspecific but may  be related to asbestos exposure.  3. Otherwise no convincing occult infection demonstrated in the chest,  abdomen, and pelvis.    DAQUAN KENNEDY MD     \[SN1.1]     Revision History        User Key Date/Time User Provider Type Action    > SN1.1 5/30/2018  7:59 AM Cecilio Medeiros MD Physician Sign            Discharge Summaries by Guy Conley MD at 5/29/2018  2:51 PM     Author:  Guy Conley MD Service:  Internal Medicine Author Type:  Physician    Filed:  5/29/2018  2:52 PM Date of Service:  5/29/2018  2:51 PM  Creation Time:  5/29/2018  2:47 PM    Status:  In Progress :  Guy Conley MD (Physician)         [facility]  Discharge Summary        Roc Parkinson MRN# [id number]   YOB: 1926 Age: 89 year old     Date of Admission: 5/23/2018  Date of Discharge: 5/30/2018  Admitting Physician: Chante Blas MD  Discharge Physician: Guy Conley MD     Primary Provider: Dickson Reich  Primary Care Physician Phone Number: [phone]         Discharge Diagnoses:   1. Fevers with weakness with concern for sepsis - suspect due to chemo (Revlimid).  2. AMS with hallucinations, suspect acute delirium vs toxic encephalopathy.  3. Multiple myeloma/myelodysplastic syndrome.   4. Pancytopenia suspect due to above and chemotherapy.  5. Acute kidney injury stage 1 on chronic kidney disease, suspect prerenal.  6. Weakness and deconditioning due to above.  7. Recent UTI with ESBL producing organism.        Other Chronic Medical Problems:      1. DM2 w/o complications.  2. Hypertension (benign essential).   3. History of diastolic CHF with preserved ejection fraction of 55-60%.   4. Dyslipidemia.    5. Hypothyroidism.    6. Paroxysmal atrial fibrillation.         Allergies:       No Known Allergies        Discharge Medications:        Current Discharge Medication List      START taking these medications    Details   acetaminophen (TYLENOL) 325 MG tablet Take 2 tablets (650 mg) by mouth every 4 hours as needed for mild pain or fever  Qty: 100 tablet, Refills: 1    Associated Diagnoses: Multiple myeloma not having achieved remission (H)      acetaminophen (TYLENOL) 650 MG Suppository Place 1 suppository (650 mg) rectally every 4 hours as needed for fever or mild pain (Do not exceed 4000 mg total acetaminophen per day.) Unwrap prior to insertion.  Qty: 12 suppository, Refills: 1    Associated Diagnoses: Multiple myeloma not having achieved remission (H)      atropine 1 %  ophthalmic solution Take 2 drops by mouth, place under tongue or place inside cheek every 2 hours as needed for other (terminal respiratory secretions) Not for ophthalmic use.  Qty: 5 mL, Refills: 1    Associated Diagnoses: Multiple myeloma not having achieved remission (H)      bisacodyl (DULCOLAX) 10 MG Suppository Unwrap and insert 1 suppository rectally twice daily as needed for constipation.  Qty: 12 suppository, Refills: 1    Associated Diagnoses: Multiple myeloma not having achieved remission (H)      haloperidol (HALDOL) 2 MG/ML (HIGH CONC) solution Take 0.5 mLs (1 mg) by mouth, place under tongue or insert rectally every 6 hours as needed for agitation (nausea)  Qty: 30 mL, Refills: 1    Associated Diagnoses: Multiple myeloma not having achieved remission (H)      HYDROmorphone, HIGH CONC, (DILAUDID) 10 mg/mL LIQD oral Take 0.2 mLs (2 mg) by mouth or place under tongue every 2 hours as needed for moderate to severe pain (and/or  shortness of breath)  Qty: 30 mL, Refills: 0    Associated Diagnoses: Multiple myeloma not having achieved remission (H)      MEDICATION INSTRUCTION If care facility cannot accept or use ranges, facility is instructed to use lower end of dosing range    Associated Diagnoses: Multiple myeloma not having achieved remission (H)      QUEtiapine (SEROQUEL) 25 MG tablet Take 1 tablet (25 mg) by mouth 2 times daily  Qty: 28 tablet, Refills: 0    Associated Diagnoses: Delirium      sennosides (SENOKOT) 8.6 MG tablet Take 1 tablet by mouth 2 times daily as needed for constipation  Qty: 100 tablet, Refills: 1    Associated Diagnoses: Multiple myeloma not having achieved remission (H)         CONTINUE these medications which have CHANGED    Details   prochlorperazine (COMPAZINE) 5 MG tablet Take 1 tablet (5 mg) by mouth every 6 hours as needed (Nausea/Vomiting)  Qty: 30 tablet, Refills: 0    Associated Diagnoses: Multiple myeloma not having achieved remission (H)         CONTINUE these  medications which have NOT CHANGED    Details   !! Levothyroxine Sodium (SYNTHROID PO) Take 50 mcg by mouth twice a week Monday and Friday      !! LEVOTHYROXINE SODIUM PO Take 25 mcg by mouth five times a week Tuesday, Wednesday, Thursday, Saturday, Sunday      polyethylene glycol (MIRALAX/GLYCOLAX) Packet Take 17 g by mouth 2 times daily as needed (constipation)  Qty: 7 packet    Associated Diagnoses: Slow transit constipation      B-D U/F 31G X 8 MM insulin pen needle USE 5 PEN NEEDLES PER DAY OR AS DIRECTED  Qty: 500 each, Refills: 1    Associated Diagnoses: Type 2 diabetes mellitus with stage 3 chronic kidney disease, with long-term current use of insulin (H)      Blood Glucose Monitoring Suppl MISC 3 times daily (before meals)       FREESTYLE LITE test strip USE TO TEST FOUR TIMES DAILY  Qty: 400 strip, Refills: 1    Associated Diagnoses: Uncontrolled type 1 diabetes mellitus with stage 3 chronic kidney disease (H)       !! - Potential duplicate medications found. Please discuss with provider.      STOP taking these medications       ACYCLOVIR PO Comments:   Reason for Stopping:         amLODIPine (NORVASC) 10 MG tablet Comments:   Reason for Stopping:         Ascorbic Acid (VITAMIN C PO) Comments:   Reason for Stopping:         dexamethasone (DECADRON) 4 MG tablet Comments:   Reason for Stopping:         ferrous sulfate (IRON) 325 (65 FE) MG tablet Comments:   Reason for Stopping:         furosemide (LASIX) 20 MG tablet Comments:   Reason for Stopping:         gemfibrozil (LOPID) 600 MG tablet Comments:   Reason for Stopping:         insulin aspart (NOVOLOG) 100 UNITS/ML injection Comments:   Reason for Stopping:         INSULIN ASPART SC Comments:   Reason for Stopping:         INSULIN ASPART SC Comments:   Reason for Stopping:         INSULIN ASPART SC Comments:   Reason for Stopping:         INSULIN ASPART SC Comments:   Reason for Stopping:         insulin detemir (LEVEMIR FLEXPEN/FLEXTOUCH) 100 UNIT/ML  injection Comments:   Reason for Stopping:         LENalidomide (REVLIMID) 5 MG CAPS capsule CHEMO Comments:   Reason for Stopping:         LISINOPRIL PO Comments:   Reason for Stopping:         Selenium 200 MCG TABS Comments:   Reason for Stopping:         VITAMIN D, CHOLECALCIFEROL, PO Comments:   Reason for Stopping:                   Discharge Instructions and Follow-Up:      Follow-up Appointments     Follow Up and recommended labs and tests       Admit to hospice.  Follow up with hospice providers.                          Consultations This Hospital Stay:      1. Hematology-Oncology ([place]).  2. Palliative Care.        Admission History:      Please see the H&P by Chante Blas MD on 5/23/2018 for complete details. Briefly, Mr. Roc Parkinson is a 89 year old male with medical history including multiple myeloma, DM2, HTN, CHF, PAF, h/o prostate and bladder cancer, and h/o recurrent UTI's including recent ESBL producing producing E. Coli, who presented 5/23/2018 with weakness and found with fever and abnormal UA.        Problem Oriented Hospital Course:      1. Fevers with weakness with concern for sepsis - suspect due to chemo (Revlimid).  Presented 5/23 with generalized weakness and fever of 103.1; WBC low but not neutropenic; initial lactic acid of 2.6; UA mildly abnormal. CXR was negative for acute infiltrate. Hx of recurrent UTI with recent ESBL E. Coli and had completed a course of IV Ertapenem on 5/20/18 and was thus started empirically on ertapenem on 5/23. UC from 5/23 was ultimately negative. Given 1 dose vanco 5/25. Procalcitonin 0.84 (moderate risk) on 5/25. BC's so far negative as of 5/26. Of note, pt recently started on Revlimid (lenalidomide) on 5/21 which can have adverse reaction of fever. Seen by Oncology 5/24 and chemo held. CT CAP on 5/25 negative for signs of focal infection. Afebrile since am 5/25. Ertapenem stopped 5/26. Repeat UA 5/2 abnormal, but with 2 sq epithelial  cells. Micro: BC's 5/23 NGTD. UC 5/23 negative. BC 5/25 NGTD. C. Diff toxin B 5/25 negative.     2. AMS with hallucinations, suspect acute delirium vs toxic encephalopathy.  Pt with hallucinations 5/27 but pt aware of them. Son 5/27 noted that he had been having hallucinations since PTA but worse on 5/27. ?side effect from ertapenem (stopped 5/26). Added PRN Seroquel 5/27. Still hallucinating/delirious 5/28. Scheduled Seroquel 5/28. UA 5/28 not impressive for infection.     3. Multiple myeloma/myelodysplastic syndrome.       Pancytopenia suspect due to above and chemotherapy.  He is being followed by Dr. Quinn of Hematology/Oncology. He has been on multiple chemotherapy regimens. As per the last notes, he was recently started on Revlimid daily on 5/21 and planned for 21-day course, then 7 days off, and he also was taking dexamethasone weekly. Seen by Oncology 5/24 and Revlimid and dexamethasone held. After further d/w Dr. Villaseñor 5/26, no plans for further treatment recommended.     4. Acute kidney injury stage 1 on chronic kidney disease, suspect prerenal.  His baseline creatinine seems to be between 0.9-1.2. His creatinine was 1.48 on admission. Cr normalized 5/26.      5. Weakness and deconditioning due to above.     6. DM2 w/o complications.  [PTA: Levemir 17 units subcutaneously every morning; insulin aspart 13U every morning, 11U with lunch, 9U with dinner, 5U at HS.]  His most recent hemoglobin A1c was 5.5 in March 2018. Glucose trending low on 5/25 evening. Levemir and prandial aspart held 5/26. Glucose levels stable despite being off long acting insulin.     OVERALL, after further d/w Oncology and Palliative Care team, pt was transitioned to comfort cares on 5/29.            Code Status:      Comfort Care        Pending Results:      None.          Discharge Disposition:      Discharged to hospice.        Discharge Time:      Greater than 30 minutes.        Key Imaging Studies, Lab Findings and  Procedures/Surgeries:        Results for orders placed or performed during the hospital encounter of 05/23/18   XR Chest 2 Views    Narrative    CHEST TWO VIEWS 5/23/2018 10:40 PM     HISTORY: Fever, chemotherapy patient.     COMPARISON: 3/25/2018.      Impression    IMPRESSION: Interval resolution of previously seen right pleural  effusion and associated infiltrate/atelectasis. A smaller left pleural  effusion on the prior exam has also resolved. No confluent infiltrates  or definite mass lesions. Heart size is difficult evaluate due to  rotation of the patient, but it is probably unchanged.    SHERYL AVALOS MD   CT Chest Abdomen Pelvis w/o Contrast    Narrative    CT CHEST, ABDOMEN, AND PELVIS WITHOUT CONTRAST 5/25/2018 12:21 PM     HISTORY: Persistent fever. Evaluate for possible source of infection.    COMPARISON: May 25, 2018    TECHNIQUE: Volumetric helical acquisition of CT images from the lung  apices through the symphysis pubis without contrast. Radiation dose  for this scan was reduced using automated exposure control, adjustment  of the mA and/or kV according to patient size, or iterative  reconstruction technique.    FINDINGS:   Chest: Bilateral calcified pleural plaques noted which are nonspecific  but may be related to prior asbestos exposure. Minimal peripheral  fibrosis and/or atelectasis noted. Trace pleural fluid or pleural  thickening bilaterally. No pericardial effusion. Heart may be generous  in size. There are moderate atherosclerotic changes of the visualized  aorta and its branches. There is no evidence of aortic aneurysm.    Abdomen and pelvis: Spleen is enlarged measuring 18.1 cm. There is  cholelithiasis without evidence for cholecystitis. The liver, spleen,  adrenal glands, kidneys, and pancreas demonstrate no worrisome focal  lesion in the absence of intravenous contrast. 3 mm nonobstructing  stone in the inferior pole of the right kidney. Tiny simple cysts in  both kidneys. There are  no dilated loops of small intestine or large  bowel to suggest ileus or obstruction. No free air or free fluid.    Survey of the visualized bony structures demonstrates no destructive  bony lesions.      Impression    IMPRESSION:  1. Marked splenomegaly at 18.1 cm.  2. Bilateral calcified pleural plaques which are nonspecific but may  be related to asbestos exposure.  3. Otherwise no convincing occult infection demonstrated in the chest,  abdomen, and pelvis.    DAQUAN KENNEDY MD     \[GC1.1]     Revision History        User Key Date/Time User Provider Type Action    > GC1.1 5/29/2018  2:52 PM Guy Conley MD Physician Sign                     Consult Notes      Consults signed by Pritesh Villaseñor MD at 5/25/2018  4:42 PM      Author:  Pritesh Villaseñor MD Service:  Hem/Onc Author Type:  Physician    Filed:  5/25/2018  4:42 PM Date of Service:  5/24/2018  3:22 PM Creation Time:  5/24/2018  4:09 PM    Status:  Signed :  Pritesh Villaseñor MD (Physician)     Consult Orders:    1. Hematology & Oncology IP Consult: h/o MM, recently started on Revlimid, admitted with fever; Consultant may enter orders: Yes; Patient to be seen: Routine - within 24 hours; Requested Clinic/Group: [facility] [[id number]] ordered by Chante Blas MD at 05/23/18 [id number]                Consult Date:  05/24/2018      This consult has been requested by Dr. Kitchen for multiple myeloma.      Mr. Parkinson is a 89-year-old gentleman with IgM kappa multiple myeloma diagnosed in 11/2016.  Bone marrow biopsy had revealed multiple myeloma and possible MDS. Patient has been seeing Dr. Quinn.  He has been on multiple different treatments.  He is currently on Revlimid 5 mg a day and dexamethasone 40 mg weekly.      Patient has been admitted to the hospital with progressive weakness.  Patient lives alone in an apartment.  In last few days, he has been progressively getting worse.  Yesterday could hardly walk.  He was brought to the  emergency room and had multiple investigations done.   -- WBC of 3.0, hemoglobin of 8.6 and platelets of 57.  On 05/16/2018, platelet was 111.   -- Creatinine of 1.48 and calcium of 9.1.   -- UA is abnormal with moderate leukocyte and WBC.   -- Chest x-ray does not reveal any infiltrate.      Patient recently was hospitalized for UTI secondary to ESBL E. coli.  He is now on antibiotics with Invanz for UTI.      I met with the patient.  His son was there. Patient's main problem is progressive weakness.      REVIEW OF SYSTEMS:    Denies any headache.  He has some dizziness. No upper respiratory infection.  No cough.  No chest pain.  No difficulty breathing.  No abdominal pain, nausea or vomiting.  Appetite has been decreased.  Denies any urinary burning.  No diarrhea.  No bleeding.      In the ER, he was found to be febrile with temperature of 103.1. He continues to have fever.      ALLERGIES:  NONE.      MEDICATIONS:  Reviewed.      PAST MEDICAL HISTORY:   1.  Coronary artery disease.   2.  Essential hypertension.   3.  Hyperlipidemia.   4.  Diabetes mellitus type 2.   5.  Chronic kidney disease, stage 3.   6.  Paroxysmal atrial fibrillation  7.  History of prostate and bladder cancer.   8.  Multiple myeloma.   9.  Osteoarthritis.   10.  History of urinary tract infection due to ESBL.   11.  Hypothyroidism.   12.  Iron deficiency anemia.   13.  ? Myelodysplastic syndrome.   14.  Vitamin D deficiency.   15.  Irritable bowel syndrome.   16.  Heart failure.   17.  Chemotherapy-induced pancytopenia.   18.  Gastroesophageal reflux disease.       PAST SURGICAL HISTORY:   1.  Bilateral total knee arthroplasty.   2.  TURP x 2 and bladder scraping.   3.  Anal fistula repair.   4.  Bilateral cataracts.   5.  Facial skin cancer removals.       SOCIAL HISTORY:    -No tobacco or alcohol use.       PHYSICAL EXAMINATION:   GENERAL:  He was alert, oriented x 3.  He looked weak.   EYES:  No icterus.   THROAT:  No ulcer or thrush.   Oral mucosa was dry.   NECK:  Supple, no tenderness.   LUNGS:  Good air entry bilaterally.   HEART:  Regular.   ABDOMEN:  Soft and nontender.  No mass.   EXTREMITIES:  No edema. No calf redness or tenderness. No  petechia. Couple of ecchymotic spots on the lower extremities.      LABORATORY DATA:  Reviewed.      ASSESSMENT:   1.  A 89-year-old gentleman with IgM kappa multiple myeloma admitted with weakness and fever.  Most likely he has sepsis.   2.  Multiple myeloma, on treatment with Revlimid and dexamethasone.   3.  Pancytopenia secondary to multiple myeloma, revlimid  and sepsis.   4.  History of prostate cancer.   5.  History of bladder cancer.      RECOMMENDATIONS:   1.  I had a long discussion with the patient.  His son was present.  Discussed regarding his symptoms. Patient has worsening weakness.  He has been having fever.  Most likely has sepsis secondary to UTI.  He recently had urosepsis.  He is on antibiotics.     2.  Discussed regarding multiple myeloma.  He is currently on Revlimid and dexamethasone.  At this time, we will put all the treatment on hold.      We will have to reconsider further treatment for multiple myeloma.  Patient is almost 89 years old.  Recently his overall health has been deteriorating.  He has been hospitalized multiple times.  Taking all this into account, it may be time for us stop all the treatment for multiple myeloma and focus on comfort care.      Patient follows up with Dr. Quinn.  I have discussed this with Dr. Quinn.  He will discuss with the patient regarding discontinuing treatment during next visit in the clinic.     3. Patient is pancytopenic.  He is not neutropenic.  CBC will be monitored.  Growth factor and transfusion will be given as needed.     4.  Patient and son had a few questions, which were all answered.  We will continue to follow him.      Thanks for the consult.         NEW CLEMENTS MD             D: 05/24/2018   T: 05/24/2018   MT: RAVI      Name:      JACOB MEDELLIN   MRN:      [id number]        Account:       [id number]   :      1926           Consult Date:  2018      Document: [id number]       cc: Dickson Reich MD[BK1.1]        Revision History        User Key Date/Time User Provider Type Action    > BK1.1 2018  4:42 PM Pritesh Villaseñor MD Physician Sign     [N/A] 2018  4:09 PM Pritesh Villaseñor MD Physician Edit            Consults by Jasmin Anderson APRN CNP at 2018 11:40 AM     Author:  Jasmin Anderson APRN CNP Service:  Palliative Author Type:  Nurse Practitioner    Filed:  2018  1:46 PM Date of Service:  2018 11:40 AM Creation Time:  2018 11:36 AM    Status:  Signed :  Jasmin Anderson APRN CNP (Nurse Practitioner)         [facility]    Palliative Care Consultation     Jacob Medellin  MRN# [id number]  Date of Admission:  2018  Date of Service (when I saw the patient): 18  Reason for consult: Consulted by Dr. Kitchen for Goals of care    Assessment & Plan   Jacob Medellin is a 89 year old male with PMH significant for HTN, HLD, CAD, HF with preserved EF, paroxysmal a.fib on aspirin, DM2, GERD, recent history recurrent UTIs, remote history prostate and bladder CA, MDS, and refractory multiple myeloma recently on therapy with revlamid/dexamethasone, who presents with weakness and fever. Infectious work up is negative to date, but fevers persist despite broad spectrum abx. ID consulted, to obtain CT CAP today. Pt has been hospitalized 4 times in the last 6 months, he has overall decline and failure to thrive. We are consulted for goals of care, per family request.     Symptoms/Recommendations[AW1.1]   1. Diarrhea. In light of fever without a known source, and recent abx use, would recommend sending stool for c.diff.     2. Goals of care. Pt continues to wish for restorative cares. He agrees that holding chemotherapy for the foreseeable future is appropriate. He is  agreeable to TCU after medical stabilization, with the goal of resuming walking. He also hopes to return to independent living again. Discussed hospice; he does not feel this is the appropriate time for it. I recommended hospice in the near future, should his strength not greatly improve from here.[AW1.2]     Support/Coping  -[AW1.1]Wife  10 years ago, sounds like she was on life support   -3 adult s[AW1.3]on[AW1.1]s all[AW1.3] live out of[AW1.1] state; Lev is[AW1.3] present and supportive today[AW1.1] (he lives in [place]). Son Bam lives in [place] and son Erick lives in [place][AW1.3]   -Pt has a twin brother here in the [facility]. He also has a sister who is 100 years old[AW1.2]   -Mackenzie and spirituality is very important to Roc; w[AW1.3]ill involve Palliative , Saira Springer[AW1.1] for additional support[AW1.3]    Decisional Support, Goals of Care, Counseling & Coordination  Decisional Capacity Intact?  -[AW1.1]Yes, but likely benefits from having the presence and support of one of his sons during difficult conversations[AW1.2]   Health Care Directive on File?  -POLST on file, reviewed   Code Status/Resuscitation Preferences?  -DNR/DNI     Discussion[AW1.1]  Visited with son Alfredo in private, while pt was being cared for by nursing and down at CT.[AW1.2] Alfredo[AW1.3] talked about Roc outliving his original prognosis when diagnosed with MM. He notes that this year has been tough for various reasons (his father in law  in January), but acknowledges Roc's overall decline. He agrees that stopping chemo is probably the right thing. He wonders how much time Roc has to live. Given the frequent hospitalizations this year and overall failure to thrive in the midst of progressive and untreated MM, I am quite worried that time is measured in weeks at this point. Alfredo voiced understanding and acceptance. We talked briefly about hospice and how that can be managed, given limited finances and  "family support here in the [facility].     I then v[AW1.2]isited with Roc and son Lev this afternoon[AW1.3], after pt returned from CT[AW1.2].[AW1.3] Introduced the scope of our practice to[AW1.1] pt and son[AW1.3]. Discussed our potential roles for symptom management, support/coping, and decisional support (aka goals of care).[AW1.1]     Together we talked about Roc's health. We note that he has MM that was recently noted to be progressing. We discussed his therapy, revlamid/dexamethasone, and how it is likely causing more issues than good. Pt is in support of holding chemotherapy for the foreseeable future, as we worry that he is too weak/deconditioned to tolerate therapy at this point.     In holding chemotherapy, we talk about ways to care for oRc. I share with him information regarding hospice. Educated patient and son regarding hospice philosophy and prognostic criteria. Dispelled common myths. Discussed what hospice is (and is not), what services are usually provided (and those that are not, ex \"senior living care\"), under what circumstances people tend to enroll, and the variety of places people can get hospice care. We talk about stepping away from chemotherapy with the hospice plan of care.     Roc very much hopes to walk again. He is motivated to work with PT to see if this is possible. He is willing to return to TCU if necessary. I share my concern that this may ultimately be the strongest that Roc will feel moving forward. Expressed concern to him that given the pattern of his recurrent hospitalizations pretty much monthly, this pattern is likely to persist. Expressed concern that should time to live be limited moving forward, he will be spending his time trying to get stronger, when that may in fact not be completely possible. We also note that this plan may detract from him spending his time doing the things that he loves.     Roc ultimately hopes to stabilize and reverse " illness at this point, if possible. He is open to ongoing discussion should things not greatly improve this hospitalization. Should he go to TCU, I encourage him to return to Dr. Quinn's clinic to further discuss how his team can continue to best care for him.[AW1.2]     Case reviewed with bedside RN Kady[AW1.1], Dr. Kitchen[AW1.2] and Dr. Villaseñor.     Thank you for involving us in the care of this patient and family. We will continue to follow. Please do not hesitate to contact me with questions or concerns or the on-call provider for our team if evening or weekend.    Adriana MUNOZ, [facility]  Palliative Medicine   Pager [phone]    Attestation:  Total time on the floor involved in the patient's care:[AW1.1] 70[AW1.2] minutes  Total time spent in counseling/care coordination: >50%    Chief Complaint   Weakness, fever     History is obtained from the patient, staff, family and extensive chart review.     Past Medical History    I have reviewed this patient's medical history and updated it with pertinent information if needed.   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  Past Surgical History:   Procedure Laterality Date     ARTHROPLASTY KNEE  11/5/2012    Procedure: ARTHROPLASTY KNEE;  RIGHT TOTAL KNEE ARTHROPLASTY (SMITH & NEPHEW)^;  Surgeon: Dickson Schulte MD;  Location:  OR     BIOPSY      bladder     COLONOSCOPY  2007     COLONOSCOPY N/A 11/15/2016    Procedure: COMBINED COLONOSCOPY, SINGLE OR MULTIPLE BIOPSY/POLYPECTOMY BY BIOPSY;  Surgeon: Kenneth Fulton MD;  Location:  GI     GENITOURINARY SURGERY      TURP x2  and bladder scraping     GI SURGERY      anal fistula     ORTHOPEDIC SURGERY      lt knee in      PHACOEMULSIFICATION CLEAR CORNEA WITH STANDARD INTRAOCULAR LENS IMPLANT Left 10/25/2017    Procedure: PHACOEMULSIFICATION CLEAR CORNEA WITH STANDARD INTRAOCULAR LENS IMPLANT;  LEFT EYE PHACOEMULSIFICATION CLEAR CORNEA WITH STANDARD INTRAOCULAR LENS IMPLANT ;  Surgeon: Shady Haider MD;  Location:  EC     PHACOEMULSIFICATION CLEAR CORNEA WITH STANDARD INTRAOCULAR LENS IMPLANT Right 2017    Procedure: PHACOEMULSIFICATION CLEAR CORNEA WITH STANDARD INTRAOCULAR LENS IMPLANT;  RIGHT EYE PHACOEMULSIFICATION CLEAR CORNEA WITH STANDARD INTRAOCULAR LENS IMPLANT;  Surgeon: Shady Haider MD;  Location:  [facility]     SOFT TISSUE SURGERY      melanoma       Social History   Living situation: Independent     Family system:[AW1.1] Wife  10 years ago, sounds like she was on life support. 3 adult s[AW1.3]on[AW1.1]s all[AW1.3] live out of[AW1.1] state; Lev is[AW1.3] present and supportive today[AW1.1] (he lives in [place]). Son Bam lives in [place] and son Erick lives in [place][AW1.3]     Self-identified support system:[AW1.1] As above[AW1.3]    Employment/education:[AW1.1] [place]1.2]    Activities/interests:[AW1.1] Loves being independent, was driving up until 2 weeks ago[AW1.2]     Use of community resources:[AW1.1] None[AW1.2]     Voodoo affiliation:[AW1.1] Scientology[AW1.3]     Involvement in aglo community:[AW1.1] Yes[AW1.3]     Impact of illness on patient:[AW1.1] Pt has refractory MM, 4 hospitalizations in the last 6 months, overall failure to thrive. He still hopes to resume walking again[AW1.2]     Family History   I have reviewed this patient's family history and updated it with pertinent information if needed.   Family History   Problem Relation Age of Onset     Cardiovascular Father 81     heart arrythmia     Endocrine Disease Brother 74     Paget's       Allergies   No Known  Allergies    Medications   Current Facility-Administered Medications Ordered in Epic   Medication Dose Route Frequency Last Rate Last Dose     acetaminophen (TYLENOL) Suppository 650 mg  650 mg Rectal Q4H PRN         acetaminophen (TYLENOL) tablet 650 mg  650 mg Oral Q4H PRN   650 mg at 05/25/18 0730     acyclovir (ZOVIRAX) capsule 400 mg  400 mg Oral BID   400 mg at 05/25/18 0916     amLODIPine (NORVASC) tablet 10 mg  10 mg Oral Daily   10 mg at 05/25/18 0916     aspirin tablet 325 mg  325 mg Oral Daily   325 mg at 05/25/18 0916     cholecalciferol (vitamin D3) tablet 1,000 Units  1,000 Units Oral Daily   1,000 Units at 05/25/18 0916     glucose gel 15-30 g  15-30 g Oral Q15 Min PRN        Or     dextrose 50 % injection 25-50 mL  25-50 mL Intravenous Q15 Min PRN        Or     glucagon injection 1 mg  1 mg Subcutaneous Q15 Min PRN         ertapenem (INVanz) 1 g vial to attach to  mL bag  1 g Intravenous Q24H   1 g at 05/24/18 2244     ferrous sulfate (IRON) tablet 325 mg  325 mg Oral Daily with breakfast   325 mg at 05/25/18 0916     gemfibrozil (LOPID) tablet 600 mg  600 mg Oral BID   600 mg at 05/25/18 0916     hydrALAZINE (APRESOLINE) injection 10 mg  10 mg Intravenous Q4H PRN         insulin aspart (NovoLOG) inj (RAPID ACTING)  1-7 Units Subcutaneous TID AC         insulin aspart (NovoLOG) inj (RAPID ACTING)  1-5 Units Subcutaneous At Bedtime         insulin aspart (NovoLOG) inj (RAPID ACTING)  4 Units Subcutaneous QAM   4 Units at 05/25/18 0915     insulin aspart (NovoLOG) inj (RAPID ACTING)  4 Units Subcutaneous Daily before lunch         insulin aspart (NovoLOG) inj (RAPID ACTING)  4 Units Subcutaneous Daily with supper         insulin detemir (LEVEMIR) injection 7 Units  7 Units Subcutaneous QAM AC   7 Units at 05/25/18 0915     levothyroxine (SYNTHROID/LEVOTHROID) tablet 25 mcg  25 mcg Oral Once per day on Sun Tue Wed Thu Sat   25 mcg at 05/24/18 0859     levothyroxine (SYNTHROID/LEVOTHROID) tablet 50  mcg  50 mcg Oral Once per day on Mon Fri   50 mcg at 05/25/18 0927     lidocaine (LMX4) cream   Topical Q1H PRN         lidocaine 1 % 1 mL  1 mL Other Q1H PRN         magnesium hydroxide (MILK OF MAGNESIA) suspension 30 mL  30 mL Oral Daily PRN         melatonin tablet 1 mg  1 mg Oral At Bedtime PRN         metoprolol succinate (TOPROL-XL) 24 hr tablet 50 mg  50 mg Oral Daily   50 mg at 05/25/18 0916     naloxone (NARCAN) injection 0.1-0.4 mg  0.1-0.4 mg Intravenous Q2 Min PRN         ondansetron (ZOFRAN-ODT) ODT tab 4 mg  4 mg Oral Q6H PRN        Or     ondansetron (ZOFRAN) injection 4 mg  4 mg Intravenous Q6H PRN   4 mg at 05/25/18 1056     polyethylene glycol (MIRALAX/GLYCOLAX) Packet 17 g  17 g Oral BID PRN   17 g at 05/24/18 1448     senna-docusate (SENOKOT-S;PERICOLACE) 8.6-50 MG per tablet 1 tablet  1 tablet Oral BID PRN   1 tablet at 05/24/18 1300    Or     senna-docusate (SENOKOT-S;PERICOLACE) 8.6-50 MG per tablet 2 tablet  2 tablet Oral BID PRN         sodium chloride (PF) 0.9% PF flush 3 mL  3 mL Intracatheter Q1H PRN         sodium chloride (PF) 0.9% PF flush 3 mL  3 mL Intracatheter Q8H   3 mL at 05/24/18 0234     sodium chloride 0.9% infusion   Intravenous Continuous 100 mL/hr at 05/25/18 0912       [START ON 5/26/2018] vancomycin (VANCOCIN) 2,000 mg in sodium chloride 0.9 % 500 mL intermittent infusion  2,000 mg Intravenous Q24H         Current Outpatient Prescriptions Ordered in Epic   Medication     metoprolol succinate (TOPROL-XL) 50 MG 24 hr tablet     [DISCONTINUED] cyclophosphamide (CYTOXAN) 50 MG capsule CHEMO       Review of Systems   The comprehensive review of systems is negative other than noted here and in the assessment/plan.    Palliative Symptom Review (0=no symptom/no concern, 1=mild, 2=moderate, 3=severe):  Pain:[AW1.1] 0[AW1.2]  Fatigue:[AW1.1] 3[AW1.2]  Nausea:[AW1.1] 0[AW1.2]  Constipation:[AW1.1] 0[AW1.2]  Diarrhea:[AW1.1] 3[AW1.2]  Depressive Symptoms:[AW1.1]  0[AW1.2]  Anxiety:[AW1.1] 0[AW1.2]  Drowsiness:[AW1.1] 0[AW1.2]  Shortness of Breath:[AW1.1] 0[AW1.2]    Physical Exam   Temp: 98.7  F (37.1  C) Temp src: Oral BP: 144/55   Heart Rate: 73 Resp: 16 SpO2: 92 % O2 Device: None (Room air) Oxygen Delivery: 1 LPM  Vitals:    05/23/18 2140 05/24/18 0535 05/25/18 0505   Weight: 90.7 kg (200 lb) 91.3 kg (201 lb 3.2 oz) 92.2 kg (203 lb 4.8 oz)     CONSTITUTIONAL:[AW1.1] Chronically ill elderly man seen lying in bed in[AW1.2] NAD,[AW1.1] lethargic, yet appropriately responsive and appearing oriented. He is c[AW1.2]mary and cooperative.[AW1.1] Son present[AW1.2]   HEENT: NCAT  RESPIRATORY: NL respiratory effort on[AW1.1] RA[AW1.2]  GASTROINTESTINAL: Soft, BS normoactive, NTND  MUSCULOSKELETAL: Moving freely in bed   NEUROLOGIC: Appropriately responsive during interview  PSYCH: Affect[AW1.1] flat[AW1.2]     Data   Results for orders placed or performed during the hospital encounter of 05/23/18 (from the past 24 hour(s))   Glucose by meter   Result Value Ref Range    Glucose 113 (H) 70 - 99 mg/dL   Glucose by meter   Result Value Ref Range    Glucose 170 (H) 70 - 99 mg/dL   Hemoglobin   Result Value Ref Range    Hemoglobin 8.1 (L) 13.3 - 17.7 g/dL   Glucose by meter   Result Value Ref Range    Glucose 138 (H) 70 - 99 mg/dL   Lactic acid level STAT for sepsis protocol   Result Value Ref Range    Lactate for Sepsis Protocol 1.0 0.4 - 1.9 mmol/L   Glucose by meter   Result Value Ref Range    Glucose 106 (H) 70 - 99 mg/dL   Basic metabolic panel   Result Value Ref Range    Sodium 138 133 - 144 mmol/L    Potassium 3.9 3.4 - 5.3 mmol/L    Chloride 110 (H) 94 - 109 mmol/L    Carbon Dioxide 19 (L) 20 - 32 mmol/L    Anion Gap 9 3 - 14 mmol/L    Glucose 103 (H) 70 - 99 mg/dL    Urea Nitrogen 28 7 - 30 mg/dL    Creatinine 1.38 (H) 0.66 - 1.25 mg/dL    GFR Estimate 48 (L) >60 mL/min/1.7m2    GFR Estimate If Black 58 (L) >60 mL/min/1.7m2    Calcium 8.2 (L) 8.5 - 10.1 mg/dL   CBC with platelets    Result Value Ref Range    WBC Canceled, Test credited 4.0 - 11.0 10e9/L    RBC Count Canceled, Test credited 4.4 - 5.9 10e12/L    Hemoglobin Canceled, Test credited 13.3 - 17.7 g/dL    Hematocrit Canceled, Test credited 40.0 - 53.0 %    MCV Canceled, Test credited 78 - 100 fl    MCH Canceled, Test credited 26.5 - 33.0 pg    MCHC Canceled, Test credited 31.5 - 36.5 g/dL    RDW Canceled, Test credited 10.0 - 15.0 %    Platelet Count Canceled, Test credited 150 - 450 10e9/L   Procalcitonin   Result Value Ref Range    Procalcitonin 0.84 ng/ml   Glucose by meter   Result Value Ref Range    Glucose 109 (H) 70 - 99 mg/dL   CBC with platelets   Result Value Ref Range    WBC 2.7 (L) 4.0 - 11.0 10e9/L    RBC Count 2.36 (L) 4.4 - 5.9 10e12/L    Hemoglobin 8.4 (L) 13.3 - 17.7 g/dL    Hematocrit 25.7 (L) 40.0 - 53.0 %     (H) 78 - 100 fl    MCH 35.6 (H) 26.5 - 33.0 pg    MCHC 32.7 31.5 - 36.5 g/dL    RDW 22.0 (H) 10.0 - 15.0 %    Platelet Count 46 (LL) 150 - 450 10e9/L   WBC Differential   Result Value Ref Range    Diff Method Automated Method     % Neutrophils 74.4 %    % Lymphocytes 19.0 %    % Monocytes 4.9 %    % Eosinophils 0.7 %    % Basophils 0.3 %    % Immature Granulocytes 0.7 %    Absolute Neutrophil 2.3 1.6 - 8.3 10e9/L    Absolute Lymphocytes 0.6 (L) 0.8 - 5.3 10e9/L    Absolute Monocytes 0.2 0.0 - 1.3 10e9/L    Absolute Eosinophils 0.0 0.0 - 0.7 10e9/L    Absolute Basophils 0.0 0.0 - 0.2 10e9/L    Abs Immature Granulocytes 0.0 0 - 0.4 10e9/L[AW1.1]                    Revision History        User Key Date/Time User Provider Type Action    > AW1.2 5/25/2018  1:46 PM Jasmin Anderson APRN CNP Nurse Practitioner Sign     AW1.3 5/25/2018 12:12 PM Jasmin Anderson APRN CNP Nurse Practitioner      AW1.1 5/25/2018 11:36 AM Jasmin Anderson APRN CNP Nurse Practitioner             Consults by Lor Cooper MD at 5/25/2018  9:16 AM     Author:  Lor Cooper MD Service:  Infectious Disease Author Type:  Physician     Filed:  5/25/2018 12:08 PM Date of Service:  5/25/2018  9:16 AM Creation Time:  5/25/2018  9:15 AM    Status:  Signed :  Lor Cooper MD (Physician)     Consult Orders:    1. Infectious Diseases IP Consult: Patient to be seen: Routine - within 24 hours; fever without clear source; Consultant may enter orders: Yes [[id number]] ordered by Cindy Kitchen MD at 05/25/18 0812                [facility]    Infectious Disease Consultation     Date of Admission:  5/23/2018  Date of Consult (When I saw the patient): 05/25/18    Assessment & Plan   Roc Parkinson is a 89 year old male who was admitted on 5/23/2018.     Impression:[DS1.1]  1. 89 y.o male with Multiple Myeloma.[DS1.2] 2. CAD, CHF, DM.   2. History of recurrent ESBL UTI.   3. Most recently was admitted for another ESBL UTI early May, received 2 weeks of IV ertapenem, just finished course 4-5 days PTA.   4. Admitted this occasion with fevers up to 103. Generalized weakness, no other localizing complaints, no pain. Is unable to tell me if this feels like his regular UTI.   5. UA slightly abnormal, UC and BC pending.   6. On ertapenem given history.   7. ROMINA    Recommendations:   Needs further investigation, distended abdomen on exam, recent antibiotics use, any diarrhea check for C diff, noted he is constipated.   GT chest abdomen and pelvis.    Elevated creat, no MRSA in any cultures. Hold off on the vancomycin.   Follow fevers, symptoms, imaging.[DS1.3]       Lor Cooper MD    Reason for Consult   Reason for consult: I was asked by Dr. Kitchen to evaluate this patient for fever.    Primary Care Physician   Dickson Reich    Chief Complaint   Generalized weakness     History is obtained from the patient and medical records    History of Present Illness   Roc Parkinson is a 89 year old male with multiple myeloma, hypertension, dyslipidemia, coronary artery disease, congestive heart failure with preserved ejection  fraction, diabetes mellitus type 2, paroxysmal atrial fibrillation on aspirin, hypothyroidism, vitamin D deficiency, chemotherapy-induced pancytopenia, history of recurrent UTI with a history of ESBL E. coli UTI, gastroesophageal reflux disease, and remote history of prostate and bladder cancer who was brought in for evaluation of generalized weakness.  He was found to be febrile, temperature 103.1 in ER and abnormal UA. He is being admitted for suspected urinary tract infection.   Just 5 days ago he had finished a 2 weeks course for ESBL UTI with IV ertapenem.     Past Medical History   I have reviewed this patient's medical history and updated it with pertinent information if needed.   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  Past Surgical History:   Procedure Laterality Date     ARTHROPLASTY KNEE  11/5/2012    Procedure: ARTHROPLASTY KNEE;  RIGHT TOTAL KNEE ARTHROPLASTY (SMITH & NEPHEW)^;  Surgeon: Dickson Schulte MD;  Location:  OR     BIOPSY      bladder     COLONOSCOPY  2007     COLONOSCOPY N/A 11/15/2016    Procedure: COMBINED COLONOSCOPY, SINGLE OR MULTIPLE BIOPSY/POLYPECTOMY BY BIOPSY;  Surgeon: Kenneth Fulton MD;  Location:  GI     GENITOURINARY SURGERY      TURP x2 and bladder scraping     GI SURGERY      anal fistula     ORTHOPEDIC SURGERY      lt knee in nov.2009     PHACOEMULSIFICATION CLEAR CORNEA WITH STANDARD INTRAOCULAR LENS IMPLANT Left 10/25/2017    Procedure: PHACOEMULSIFICATION CLEAR CORNEA WITH STANDARD INTRAOCULAR LENS IMPLANT;  LEFT EYE PHACOEMULSIFICATION CLEAR CORNEA WITH STANDARD  INTRAOCULAR LENS IMPLANT ;  Surgeon: Shady Haider MD;  Location: [facility]     PHACOEMULSIFICATION CLEAR CORNEA WITH STANDARD INTRAOCULAR LENS IMPLANT Right 11/1/2017    Procedure: PHACOEMULSIFICATION CLEAR CORNEA WITH STANDARD INTRAOCULAR LENS IMPLANT;  RIGHT EYE PHACOEMULSIFICATION CLEAR CORNEA WITH STANDARD INTRAOCULAR LENS IMPLANT;  Surgeon: Shady Haider MD;  Location: [facility]     SOFT TISSUE SURGERY      melanoma       Prior to Admission Medications   Prior to Admission Medications   Prescriptions Last Dose Informant Patient Reported? Taking?   ACYCLOVIR PO 5/23/2018 at Unknown time Self Yes Yes   Sig: Take 400 mg by mouth 2 times daily Oncology to decide when stop date will be   Ascorbic Acid (VITAMIN C PO) 5/23/2018 at Unknown time Self Yes Yes   Sig: Take 500 mg by mouth daily    B-D U/F 31G X 8 MM insulin pen needle   No No   Sig: USE 5 PEN NEEDLES PER DAY OR AS DIRECTED   Blood Glucose Monitoring Suppl MISC   Yes No   Sig: 3 times daily (before meals)    FREESTYLE LITE test strip   No No   Sig: USE TO TEST FOUR TIMES DAILY   INSULIN ASPART SC 5/23/2018 at Unknown time Self Yes Yes   Sig: Inject 13 Units Subcutaneous every morning    INSULIN ASPART SC 5/23/2018 at Unknown time Self Yes Yes   Sig: Inject 11 Units Subcutaneous daily (before lunch)    INSULIN ASPART SC 5/23/2018 at Unknown time Self Yes Yes   Sig: Inject 5 Units Subcutaneous At Bedtime Inject 5 unit subcutaneously at bedtime for Diabetes II Ok to use Humalog insulin with same order details   INSULIN ASPART SC 5/23/2018 at Unknown time Self Yes Yes   Sig: Inject Subcutaneous 4 times daily Sliding scale:  140-175 1 unit  176-210 2 units  211-245 3 units  246-280 4 units  281-315 5 units  316-350 6 units  351-385 7 units  386-420 8 units  >420 9 units   LENalidomide (REVLIMID) 5 MG CAPS capsule CHEMO   No Yes   Sig: Take 1 capsule (5 mg) by mouth daily for 21 days Take on Days 1 through 21 of 28-day cycle.   LEVOTHYROXINE SODIUM PO 5/23/2018  at Unknown time  Yes Yes   Sig: Take 25 mcg by mouth five times a week Tuesday, Wednesday, Thursday, Saturday, Sunday   LISINOPRIL PO 5/23/2018 at Unknown time Self Yes Yes   Sig: Take 15 mg by mouth daily   Levothyroxine Sodium (SYNTHROID PO) Past Week at Unknown time  Yes Yes   Sig: Take 50 mcg by mouth twice a week Monday and Friday   Selenium 200 MCG TABS 5/23/2018 at Unknown time Self Yes Yes   Sig: Take 100 mcg by mouth daily. Pt takes one half tab of the 200 mcg daily    VITAMIN D, CHOLECALCIFEROL, PO 5/23/2018 at Unknown time Self Yes Yes   Sig: Take 1,000 Units by mouth daily    amLODIPine (NORVASC) 10 MG tablet 5/23/2018 at Unknown time Self No Yes   Sig: Take 1 tablet (10 mg) by mouth daily   dexamethasone (DECADRON) 4 MG tablet   No Yes   Sig: Take 10 tablets (40 mg) by mouth once a week Once a week with food on Days 1,8,15 and 22.   ferrous sulfate (IRON) 325 (65 FE) MG tablet 5/23/2018 at Unknown time Self Yes Yes   Sig: Take 325 mg by mouth daily (with breakfast)   furosemide (LASIX) 20 MG tablet 5/23/2018 at Unknown time Self No Yes   Sig: TAKE 1 TABLET BY MOUTH EVERY DAY   gemfibrozil (LOPID) 600 MG tablet 5/23/2018 at Unknown time Self No Yes   Sig: TAKE 1 TABLET BY MOUTH TWICE DAILY   insulin aspart (NOVOLOG) 100 UNITS/ML injection 5/23/2018 at Unknown time  No Yes   Sig: Inject 5 Units Subcutaneous daily (with dinner)   Patient taking differently: Inject 9 Units Subcutaneous daily (with dinner)    insulin detemir (LEVEMIR FLEXPEN/FLEXTOUCH) 100 UNIT/ML injection 5/23/2018 at Unknown time  No Yes   Sig: Inject 7 Units Subcutaneous every morning (before breakfast)   Patient taking differently: Inject 10 Units Subcutaneous every morning (before breakfast)    metoprolol succinate (TOPROL-XL) 50 MG 24 hr tablet   No No   Sig: TAKE 1 TABLET(50 MG) BY MOUTH DAILY   polyethylene glycol (MIRALAX/GLYCOLAX) Packet Past Month at Unknown time Self No Yes   Sig: Take 17 g by mouth 2 times daily as needed  (constipation)   prochlorperazine (COMPAZINE) 5 MG tablet More than a month at Unknown time  No No   Sig: Take 1 tablet (5 mg) by mouth every 6 hours as needed (Nausea/Vomiting)      Facility-Administered Medications: None     Allergies   No Known Allergies    Immunization History   Immunization History   Administered Date(s) Administered     Influenza (High Dose) 3 valent vaccine 10/01/2015, 10/01/2016, 10/04/2017     Influenza (IIV3) PF 10/01/2013, 09/25/2014     Pneumo Conj 13-V (2010&after) 10/01/2015     Pneumococcal 23 valent 01/01/2010     TD (ADULT, 7+) 08/11/2009       Social History   I have reviewed this patient's social history and updated it with pertinent information if needed. Roc Parkinson  reports that he has never smoked. He has never used smokeless tobacco. He reports that he does not drink alcohol or use illicit drugs.    Family History   I have reviewed this patient's family history and updated it with pertinent information if needed.   Family History   Problem Relation Age of Onset     Cardiovascular Father 81     heart arrythmia     Endocrine Disease Brother 74     Paget's       Review of Systems   The 10 point Review of Systems is negative other than noted in the HPI or here.     Physical Exam   Temp: 101.2  F (38.4  C) Temp src: Oral BP: 144/55   Heart Rate: 73 Resp: 16 SpO2: 92 % O2 Device: Nasal cannula Oxygen Delivery: 1 LPM  Vital Signs with Ranges  Temp:  [99.9  F (37.7  C)-103.4  F (39.7  C)] 101.2  F (38.4  C)  Heart Rate:  [51-86] 73  Resp:  [16-18] 16  BP: (135-171)/(42-69) 144/55  SpO2:  [88 %-95 %] 92 %  203 lbs 4.8 oz  Body mass index is 29.17 kg/(m^2).    GENERAL APPEARANCE:[DS1.1]  Fatigued, lethargic[DS1.2]   EYES: Eyes grossly normal to inspection, PERRL and conjunctivae and sclerae normal  HENT: ear canals and TM's normal and nose and mouth without ulcers or lesions  NECK: no adenopathy, no asymmetry, masses, or scars and thyroid normal to palpation  RESP: lungs clear to  auscultation - no rales, rhonchi or wheezes  CV: regular rates and rhythm, normal S1 S2, no S3 or S4 and no murmur, click or rub  LYMPHATICS: normal ant/post cervical and supraclavicular nodes  ABDOMEN:[DS1.1] distended but soft, BS +[DS1.2]   MS: extremities normal- no gross deformities noted  SKIN: no suspicious lesions or rashes      Data   Lab Results   Component Value Date    WBC 2.7 (L) 05/25/2018    HGB 8.4 (L) 05/25/2018    HCT 25.7 (L) 05/25/2018     (H) 05/25/2018    PLT 46 (LL) 05/25/2018       Recent Labs  Lab 05/23/18  2215 05/23/18  2140 05/22/18  1249   CULT No growth after 1 day  No growth No growth after 1 day No growth     Recent Labs   Lab Test  05/23/18   2215  05/23/18   2140  05/22/18   1249  05/07/18   0956  01/20/18   0439  01/20/18   0424  01/20/18   0314  12/13/17   1049  12/01/17   1600   CULT  No growth after 1 day  No growth  No growth after 1 day  No growth  >100,000 colonies/mL  Escherichia coli ESBL  *  >100,000 colonies/mL  Strain 2  Escherichia coli ESBL  *  ESBL (extended beta lactamase) producing organisms require contact precautions.  Enterobacteriaceae that are susceptible to meropenem are usually susceptible to ertapenem.  No growth  No growth  >100,000 colonies/mL  Escherichia coli ESBL  *  >100,000 colonies/mL  Strain 2  Escherichia coli ESBL  *  Enterobacteriaceae that are susceptible to meropenem are usually susceptible to ertapenem.  ESBL (extended beta lactamase) producing organisms require contact precautions.  >100,000 colonies/mL  Escherichia coli ESBL  ESBL (extended beta lactamase) producing organisms require contact precautions.  *  50,000 to 100,000 colonies/mL  Escherichia coli ESBL  *  10,000 to 50,000 colonies/mL  Escherichia coli ESBL  *  <10,000 colonies/mL  urogenital janneth    ESBL (extended beta lactamase) producing organisms require contact precautions.[DS1.1]                Revision History        User Key Date/Time User Provider Type  Action    > DS1.3 5/25/2018 12:08 PM Lor Cooper MD Physician Sign     DS1.2 5/25/2018 12:00 PM Lor Cooper MD Physician      DS1.1 5/25/2018  9:15 AM Lor Cooper MD Physician                      Progress Notes - Physician (Notes from 05/27/18 through 05/30/18)      Progress Notes by Pritesh Villaseñor MD at 5/29/2018  4:33 PM     Author:  Pritesh Villaseñor MD Service:  Hem/Onc Author Type:  Physician    Filed:  5/29/2018  4:34 PM Date of Service:  5/29/2018  4:33 PM Creation Time:  5/29/2018  4:33 PM    Status:  Signed :  Pritesh Villaseñor MD (Physician)         Chart check. Patient going on hospice.[BK1.1]     Revision History        User Key Date/Time User Provider Type Action    > BK1.1 5/29/2018  4:34 PM Pritesh Villaseñor MD Physician Sign            Progress Notes by Ebony Harrington LSW at 5/29/2018 11:13 AM     Author:  Ebony Harrington LSW Service:  (none) Author Type:      Filed:  5/29/2018  3:01 PM Date of Service:  5/29/2018 11:13 AM Creation Time:  5/29/2018 11:13 AM    Status:  Addendum :  Ebony Harrington LSW ()         AMRITA  I: AMRITA was updated that patient has changed to comfort care. After conversation with son and family is interested in having patient d/c to [facility] hospice home. SW called and left message for [facility] checking bed availability. SW also discussed the possibility of applying for MA and d/cing to LTC facility. Son is agreeable to either plan but would like to check with [facility] before any other plan. SW awaits a call back from [facility] admissions.[SM1.1]      ADDENDUM  I: [facility] has a male bed but no private. Admissions is checking if they can accommodate patient's ESBL in a double. Admissions will contact SW once confirmed.[SM1.2]     ADDENDUM  Juana at [facility] stated they can accept with ESBL. SW will send application/referral.[SM1.3]    ADDENDUM  I: AMRITA was updated that patient had been accepted at [facility] and could d/c to facility in AM on 5/30. AMRITA spoke  with son and he is agreeable. SW will arrange stretcher transport for patient due to impaired cognition, d/cing on hospice, hallucinating, assist of 2. AMRITA arranged stretcher for 930 on 5/30. AMRITA updated MD. NO MEDS will be sent. AMRITA will fax orders when complete. AMRITA updated [facility] via .[SM1.4]    P: SW will continue to follow and assist as needed.    JAYDA Moody   *[id number][SM1.1]       Revision History        User Key Date/Time User Provider Type Action    > SM1.4 5/29/2018  3:01 PM Ebony Harrington LSW  Addend     SM1.3 5/29/2018 11:29 AM Ebony Harrington LSW  Addend     SM1.2 5/29/2018 11:27 AM Ebony Harrington LSW  Addend     SM1.1 5/29/2018 11:23 AM Ebony Harrington LSW  Sign            Progress Notes by uGy Conley MD at 5/29/2018  2:21 PM     Author:  Guy Conley MD Service:  Internal Medicine Author Type:  Physician    Filed:  5/29/2018  2:53 PM Date of Service:  5/29/2018  2:21 PM Creation Time:  5/29/2018  2:21 PM    Status:  Signed :  Guy Conley MD (Physician)         [facility]    Internal Medicine Hospitalist Progress Note  05/29/2018  I evaluated patient on the above date.    Guy Conley Jr., MD  [phone] (p)  Text Page      Assessment & Plan[GC1.1]   ASSESSMENT:[GC1.2]  Mr. Roc Parkinson is a 89 year old male with medical history including multiple myeloma, DM2, HTN, CHF, PAF, h/o prostate and bladder cancer, and h/o recurrent UTI's including recent ESBL producing producing E. Coli, who presented 5/23/2018 with weakness and found with fever and abnormal UA.   [GC1.1]  1.[GC1.2] Fevers with weakness with concern for sepsis -[GC1.1] suspect due[GC1.2] to chemo (Revlimid).  Presented 5/23 with generalized weakness and fever of 103.1; WBC low but not neutropenic; initial lactic acid of 2.6; UA mildly abnormal. CXR was negative for acute infiltrate. Hx of recurrent UTI with  recent ESBL E. Coli and had completed a course of IV Ertapenem on 5/20/18 and was thus started empirically on ertapenem on 5/23. UC from 5/23 was ultimately negative. Given 1 dose vanco 5/25. Procalcitonin 0.84 (moderate risk) on 5/25. BC's so far negative as of 5/26. Of note, pt recently started on Revlimid (lenalidomide) on 5/21 which can have adverse reaction of fever. Seen by Oncology 5/24 and chemo held. CT CAP on 5/25 negative for signs of focal infection. Afebrile since am 5/25. Ertapenem stopped 5/26.[GC1.1] Repeat UA 5/2 abnormal, but with 2 sq epithelial cells.[GC1.2] Micro: BC's 5/23 NGTD. UC 5/23 negative. BC 5/25 NGTD. C. Diff toxin B 5/25 negative.[GC1.1]    2.[GC1.2] AMS with hallucinations, suspect acute delirium vs toxic encephalopathy.  Pt with hallucinations 5/27 but pt aware of them. Son 5/27 noted that he had been having hallucinations since PTA but worse on 5/27. ?side effect from ertapenem (stopped 5/26). Added PRN Seroquel 5/27. Still hallucinating/delirious 5/28. Scheduled Seroquel 5/28.[GC1.1] UA 5/28 not impressive for infection.[GC1.2]   [GC1.1]  3.[GC1.2] Multiple myeloma/myelodysplastic syndrome.       Pancytopenia suspect due to above and chemotherapy.  He is being followed by Dr. Quinn of Hematology/Oncology. He has been on multiple chemotherapy regimens. As per the last notes, he was recently started on Revlimid daily on 5/21 and planned for 21-day course, then 7 days off, and he also was taking dexamethasone weekly. Seen by Oncology 5/24 and Revlimid and dexamethasone held. After further d/w Dr. Villaseñor 5/26, no plans for further treatment.[GC1.1]     4. A[GC1.2]cute kidney injury stage 1 on chronic kidney disease, suspect prerenal.  His baseline creatinine seems to be between 0.9-1.2. His creatinine was 1.48 on admission.[GC1.1] Cr normalized 5/26.[GC1.2]     5.[GC1.3] Weakness and deconditioning[GC1.1] due to above.[GC1.2]    6.[GC1.3] DM2 w/o complications.  [PTA: Levemir 17 units  "subcutaneously every morning; insulin aspart 13U every morning, 11U with lunch, 9U with dinner, 5U at HS.]  His most recent hemoglobin A1c was 5.5 in March 2018. Glucose trending low on 5/25 evening. Levemir and prandial aspart held 5/26.[GC1.1] Glucose levels stable despite being off long acting insulin.[GC1.2]    OVERALL, after further d/w Oncology and[GC1.3] Palliative Care team[GC1.2], pt was[GC1.3] transitioned to comfort cares on 5/29.       OTHER CHRONIC ISSUES:[GC1.2]  Hypertension (benign essential).   History of diastolic CHF with preserved ejection fraction of 55-60%.   Dyslipidemia.    Hypothyroidism.    Paroxysmal atrial fibrillation.      CODE STATUS: DNR/DNI[GC1.1]/comfort cares.    PLAN:  - Continue comfort measures.  - Family wanted to continue ISS.  - Plan discharge to hospice facility or NH with hospice.[GC1.2]    Interval History[GC1.1]   Pt confused but pleasant.[GC1.4]    -Data reviewed today: I reviewed all new labs and imaging over the last 24 hours. I personally reviewed no images or EKG's today.    Physical Exam   Heart Rate: 75, Blood pressure 128/56, pulse 83, temperature 99.2  F (37.3  C), temperature source Oral, resp. rate 16, height 1.778 m (5' 10\"), weight 93.9 kg (207 lb 0.2 oz), SpO2 93 %.  Vitals:    05/26/18 0551 05/28/18 0613 05/29/18 0656   Weight: 96.7 kg (213 lb 1.6 oz) 94.1 kg (207 lb 6.4 oz) 93.9 kg (207 lb 0.2 oz)     Vital Signs with Ranges  Temp:  [96.2  F (35.7  C)-99.2  F (37.3  C)] 99.2  F (37.3  C)  Pulse:  [83] 83  Heart Rate:  [71-75] 75  Resp:  [16-18] 16  BP: (128-163)/(56-71) 128/56  SpO2:  [93 %-96 %] 93 %  Patient Vitals for the past 24 hrs:   BP Temp Temp src Pulse Heart Rate Resp SpO2 Weight   05/29/18 0732 128/56 99.2  F (37.3  C) Oral 83 - 16 93 % -   05/29/18 0656 - - - - - - - 93.9 kg (207 lb 0.2 oz)   05/29/18 0111 163/70 97.8  F (36.6  C) Oral - 75 18 95 % -   05/28/18 1905 160/71 97.9  F (36.6  C) Axillary - 74 18 96 % -   05/28/18 1523 150/66 96.2  F " (35.7  C) Oral - 71 18 95 % -     I/O's Last 24 hours  I/O last 3 completed shifts:  In: 774 [P.O.:100; I.V.:674]  Out: 725 [Urine:725]    Constitutional:[GC1.1] Pleasantly confused.[GC1.4]  Respiratory:   Cardiovascular:   GI:   Skin/Integumen:   Other:        Data     Recent Labs  Lab 05/29/18  0755 05/28/18  0730 05/27/18 0628 05/26/18  0710  05/24/18  0701   WBC 2.4* 2.5* 3.1* 3.4*  < > 2.7*   HGB 7.9* 8.0* 7.9* 8.1*  < > 8.1*   * 108* 109* 108*  < > 109*   PLT 41* 43* 38* 42*  < > 48*   NA  --  142 141 139  < > 139   POTASSIUM  --  3.8 3.8 3.8  < > 3.7   CHLORIDE  --  116* 115* 113*  < > 109   CO2  --  17* 18* 18*  < > 21   BUN  --  14 18 25  < > 31*   CR 1.02 0.94 1.08 1.24  < > 1.24   ANIONGAP  --  9 8 8  < > 9   MICHELLE  --  8.3* 7.7* 8.0*  < > 8.5   GLC  --  120* 98 74  < > 137*   ALBUMIN  --   --   --   --   --  2.0*   PROTTOTAL  --   --   --   --   --  10.0*   BILITOTAL  --   --   --   --   --  0.2   ALKPHOS  --   --   --   --   --  62   ALT  --   --   --   --   --  19   AST  --   --   --   --   --  21   < > = values in this interval not displayed.  Recent Labs   Lab Test  05/29/18   1148  05/29/18   0754  05/29/18   0502  05/28/18   2138  05/28/18   1644   05/28/18   0730   05/27/18   0628   05/26/18   0710   05/25/18   0705   05/24/18   0701   GLC   --    --    --    --    --    --   120*   --   98   --   74   --   103*   --   137*   BGM  155*  142*  129*  161*  186*   < >   --    < >   --    < >   --    < >   --    < >   --     < > = values in this interval not displayed.         No results found for this or any previous visit (from the past 24 hour(s)).    Medications   All medications were reviewed.      cefTRIAXone  1 g Intravenous Q24H     insulin aspart  1-3 Units Subcutaneous TID AC     insulin aspart  1-3 Units Subcutaneous At Bedtime     levothyroxine (SYNTHROID/LEVOTHROID) tablet 25 mcg  25 mcg Oral Once per day on Sun Tue Wed Thu Sat     levothyroxine (SYNTHROID/LEVOTHROID) tablet 50 mcg   50 mcg Oral Once per day on Mon Fri     QUEtiapine  25 mg Oral BID     sodium chloride (PF)  3 mL Intracatheter Q8H[GC1.1]          Revision History        User Key Date/Time User Provider Type Action    > GC1.4 5/29/2018  2:53 PM Guy Conley MD Physician Sign     GC1.3 5/29/2018  2:29 PM Guy Conley MD Physician      GC1.2 5/29/2018  2:23 PM Guy Conley MD Physician      GC1.1 5/29/2018  2:21 PM Guy Conley MD Physician             Progress Notes by Lor Cooper MD at 5/29/2018 10:00 AM     Author:  Lor Cooper MD Service:  Infectious Disease Author Type:  Physician    Filed:  5/29/2018 12:46 PM Date of Service:  5/29/2018 10:00 AM Creation Time:  5/29/2018 10:00 AM    Status:  Signed :  Lor Cooper MD (Physician)         [facility]    Infectious Disease Progress Note    Date of Service (when I saw the patient): 05/29/2018     Assessment & Plan   Roc Parkinson is a 89 year old male who was admitted on 5/23/2018.     ASSESSMENT:  1. FEVERS-better, was on ertapenem and recently completed ertapenem course for ESBL + UTI, now off antibiotics, Cxs negative, fever better  2. MYELOMA-Has been on Revlimid  3. RECENT ESBL + E.COLI UTI     PLAN  1. Follow off antibiotics  2. F/u temps  3. Reassess, I saw the UA from yesterday, no antibiotics for now, I stopped ceftriaxone.     Lor Cooper MD    Interval History   Remains afebrile   Repeat UA is better than the one done on admission which had grown no organisms.     Physical Exam   Temp: 99.2  F (37.3  C) Temp src: Oral BP: 128/56 Pulse: 83 Heart Rate: 75 Resp: 16 SpO2: 93 % O2 Device: None (Room air)    Vitals:    05/26/18 0551 05/28/18 0613 05/29/18 0656   Weight: 96.7 kg (213 lb 1.6 oz) 94.1 kg (207 lb 6.4 oz) 93.9 kg (207 lb 0.2 oz)     Vital Signs with Ranges  Temp:  [96.2  F (35.7  C)-99.2  F (37.3  C)] 99.2  F (37.3  C)  Pulse:  [83] 83  Heart Rate:  [71-75] 75  Resp:  [16-18] 16  BP:  (128-163)/(56-71) 128/56  SpO2:  [93 %-96 %] 93 %    Constitutional: Awake, alert, cooperative, no apparent distress  Lungs: Clear to auscultation bilaterally, no crackles or wheezing  Cardiovascular: Regular rate and rhythm, normal S1 and S2, and no murmur noted  Abdomen: Normal bowel sounds, soft, non-distended, non-tender  Skin: No rashes, no cyanosis, no edema  Other:    Medications       acyclovir (ZOVIRAX) capsule 400 mg  400 mg Oral BID     amLODIPine  10 mg Oral Daily     aspirin  325 mg Oral Daily     cholecalciferol  1,000 Units Oral Daily     ferrous sulfate  325 mg Oral Daily with breakfast     insulin aspart  1-3 Units Subcutaneous TID AC     insulin aspart  1-3 Units Subcutaneous At Bedtime     levothyroxine (SYNTHROID/LEVOTHROID) tablet 25 mcg  25 mcg Oral Once per day on Sun Tue Wed Thu Sat     levothyroxine (SYNTHROID/LEVOTHROID) tablet 50 mcg  50 mcg Oral Once per day on Mon Fri     metoprolol succinate  50 mg Oral Daily     QUEtiapine  25 mg Oral BID     sodium chloride (PF)  3 mL Intracatheter Q8H       Data   All microbiology laboratory data reviewed.  Recent Labs   Lab Test  05/29/18   0755  05/28/18   0730  05/27/18   0628   WBC  2.4*  2.5*  3.1*   HGB  7.9*  8.0*  7.9*   HCT  24.2*  24.7*  24.7*   MCV  109*  108*  109*   PLT  41*  43*  38*     Recent Labs   Lab Test  05/29/18   0755  05/28/18   0730  05/27/18   0628   CR  1.02  0.94  1.08     Recent Labs   Lab Test  11/17/16   0938   SED  126*     Recent Labs   Lab Test  05/28/18   1830  05/25/18   0800  05/25/18   0755  05/23/18   2215  05/23/18   2140  05/22/18   1249  05/07/18   0956  01/20/18   0439  01/20/18   0424   CULT  PENDING  No growth after 4 days  No growth after 4 days  No growth after 5 days  No growth  No growth after 5 days  No growth  >100,000 colonies/mL  Escherichia coli ESBL  *  >100,000 colonies/mL  Strain 2  Escherichia coli ESBL  *  ESBL (extended beta lactamase) producing organisms require contact precautions.   Enterobacteriaceae that are susceptible to meropenem are usually susceptible to ertapenem.  No growth  No growth[DS1.1]        Revision History        User Key Date/Time User Provider Type Action    > DS1.1 5/29/2018 12:46 PM Lor Cooper MD Physician Sign            Progress Notes by Jasmin Anderson APRN CNP at 5/29/2018 11:54 AM     Author:  Jasmin Anderson APRN CNP Service:  Palliative Author Type:  Nurse Practitioner    Filed:  5/29/2018 12:28 PM Date of Service:  5/29/2018 11:54 AM Creation Time:  5/29/2018 11:54 AM    Status:  Signed :  Jasmin Anderson APRN CNP (Nurse Practitioner)         [facility]    Palliative Care Progress Note    Roc Parkinson  MRN# [id number]  Date of Admission:  5/23/2018  Date of Service (when I saw the patient): 05/29/2018    Assessment & Plan   Roc Parkinson is a 89 year old male with PMH significant for HTN, HLD, CAD, HF with preserved EF, paroxysmal a.fib on aspirin, DM2, GERD, recent history recurrent UTIs, remote history prostate and bladder CA, MDS, and refractory multiple myeloma recently on therapy with revlamid/dexamethasone, who presents with weakness and fever. Source of infection has been unclear, ID following. He developed delirium over the weekend. Pt has been hospitalized 4 times in the last 6 months, he has overall decline and failure to thrive. We are following for goals of care, per family request.    Symptoms/Recommendations  -Transition to comfort measures only, ok to stop checking labs, VS, no further escalation of care   -Continue IV ceftriaxone for empiric UTI coverage; family understands that IV abx do not continue with hospice care after discharge. Continue conversation about whether the abx are helping his comfort/care or not   -Continue insulin support for comfort; when it becomes clear that he is not eating and risk of hypoglycemia increases, ok to consider stopping insulin support   -Dilaudid 2-4mg SL every 2hrs PRN  pain, SOB, dyspnea, distress   -Haldol 1-2mg SL every 6hrs GA agitation, delirium, hallucinations, nausea   -PRN bowel regimen   -Atropine, robinul PRN secretions   -SW consult for hospice; will need placement. Considering LTC with possible need for MA application vs consider [facility]   -Family accepting of [facility] hospice     Support/Coping  -Wife  10 years ago, sounds like she was on life support   -3 adult sons all live out of state; Lev is present and supportive today (he lives in [place]). Son Bam lives in [place] and son Erick lives in [place]   -Pt has a twin brother here in the [facility]. He also has a sister who is 100 years old   -Mackenzie and spirituality is very important to Roc; appreciate support of Palliative , Saira Springer     Decisional Support, Goals of Care, Counseling & Coordination  Decisional Capacity Intact?  -No  Health Care Directive on File?  -POLST on file, reviewed. Apparently he does have a health care directive, but family lost it. It was similar to their mother's, which they have access to and are referencing. We do not have a HCD on file   Code Status/Resuscitation Preferences?  -DNR/DNI     Discussion  Visited with Roc and son Alfredo this AM. Roc is much more lethargic today and less interactive. He is barely able to open his eyes. He does not recognize me. He is able to answer some questions seemingly appropriately, like in regard to comfort level. Otherwise, his responses to questions are fairly nonsensical and unrelated.     Visited with peggy Fuentes in private, along with pt's other son Bam via phone and Bam's wife Kelly. Family is all very supportive of each other and working closely together to develop a good plan for Roc.     Together we reviewed pt's current health issues and weekend events. I expressed concern to family today that we seem to be trading one problem for another, and unfortunately, I do not see that cycle breaking. We talk about  "the lack of improvement this weekend, and even the further decline. I worry that Roc's wish of walking again is likely not very realistic at this point. We talk about the hospital delirium and the likely multifactorial causes. We discuss that delirium can in fact be a terminal symptom at end of life. Family was very understanding in exploring Roc's current health concerns, largely being[AW1.1] a complication of the progressing myeloma. Expressed concern that time very may well be limited to weeks at this point (family asked for this information in determining coming into town and logistics).     We thus talk about the option of TCU vs hospice. Family is interested in learning more about hospice. Educated family regarding hospice philosophy and prognostic criteria. Dispelled common myths. Discussed what hospice is (and is not), what services are usually provided (and those that are not, ex \"[facility] care\"), under what circumstances people tend to enroll, and the variety of places people can get hospice care (along with subsequent financial implications). Discussed typical anticipated timing of discharge. Based on this discussion, family would like to initiate a comfort plan of care and hospice discharge plan. They are concerned about finances and would thus like to explore payment options with SW. Did offer [facility] as an option.     Case reviewed with bedside RN Juana, unit AMRITA Beck, and Dr. Conley.[AW1.2]     Thank you for involving us in the care of this patient and family. We will continue to follow. Please do not hesitate to contact me with questions or concerns or the on-call provider for our team if evening or weekend.    Adriana MUNOZ, [facility]  Palliative Medicine   Pager [phone]    Attestation:  Total time on the floor involved in the patient's care:[AW1.1] 60[AW1.2] minutes  Total time spent in counseling/care coordination: >50%    Interval History[AW1.1]   Pt became delirious and confused " over the weekend. He seemingly defervesced and abx were stopped, but now with low grade fever again and UA concerning for UTI. ID continues to follow. Family continues to be concerned for Roc's overall health.[AW1.2]     Medications   Current Facility-Administered Medications Ordered in Epic   Medication Dose Route Frequency Last Rate Last Dose     acetaminophen (TYLENOL) Suppository 650 mg  650 mg Rectal Q4H PRN         acetaminophen (TYLENOL) tablet 650 mg  650 mg Oral Q4H PRN   650 mg at 05/25/18 0730     atropine 1 % ophthalmic solution 1-2 drop  1-2 drop Sublingual Q1H PRN         [START ON 6/1/2018] bisacodyl (DULCOLAX) Suppository 10 mg  10 mg Rectal Once PRN         cefTRIAXone (ROCEPHIN) 1 g vial to attach to  mL bag for ADULTS or NS 50 mL bag for PEDS  1 g Intravenous Q24H         glucose gel 15-30 g  15-30 g Oral Q15 Min PRN        Or     dextrose 50 % injection 25-50 mL  25-50 mL Intravenous Q15 Min PRN        Or     glucagon injection 1 mg  1 mg Subcutaneous Q15 Min PRN         glycopyrrolate (ROBINUL) injection 0.2-0.4 mg  0.2-0.4 mg Intravenous Q4H PRN         haloperidol (HALDOL) 2 MG/ML (HIGH CONC) solution 1-2 mg  1-2 mg Oral Q6H PRN         HYDROmorphone (DILAUDID) (HIGH CONC) oral solution 2-4 mg  2-4 mg Sublingual Q2H PRN         insulin aspart (NovoLOG) inj (RAPID ACTING)  1-3 Units Subcutaneous TID AC   1 Units at 05/29/18 0843     insulin aspart (NovoLOG) inj (RAPID ACTING)  1-3 Units Subcutaneous At Bedtime         levothyroxine (SYNTHROID/LEVOTHROID) tablet 25 mcg  25 mcg Oral Once per day on Sun Tue Wed Thu Sat   25 mcg at 05/29/18 1036     levothyroxine (SYNTHROID/LEVOTHROID) tablet 50 mcg  50 mcg Oral Once per day on Mon Fri   50 mcg at 05/28/18 0829     lidocaine (LMX4) cream   Topical Q1H PRN         lidocaine 1 % 1 mL  1 mL Other Q1H PRN         magnesium hydroxide (MILK OF MAGNESIA) suspension 30 mL  30 mL Oral Daily PRN         melatonin tablet 1 mg  1 mg Oral At Bedtime  PRN         miconazole (MICATIN; MICRO GUARD) 2 % powder   Topical Q1H PRN         naloxone (NARCAN) injection 0.1-0.4 mg  0.1-0.4 mg Intravenous Q2 Min PRN         ondansetron (ZOFRAN-ODT) ODT tab 4 mg  4 mg Oral Q6H PRN        Or     ondansetron (ZOFRAN) injection 4 mg  4 mg Intravenous Q6H PRN   4 mg at 05/25/18 1056     polyethylene glycol (MIRALAX/GLYCOLAX) Packet 17 g  17 g Oral BID PRN   17 g at 05/24/18 1448     QUEtiapine (SEROquel) tablet 25 mg  25 mg Oral BID   25 mg at 05/29/18 0840     senna-docusate (SENOKOT-S;PERICOLACE) 8.6-50 MG per tablet 1 tablet  1 tablet Oral BID PRN   1 tablet at 05/24/18 1300    Or     senna-docusate (SENOKOT-S;PERICOLACE) 8.6-50 MG per tablet 2 tablet  2 tablet Oral BID PRN         sodium chloride (PF) 0.9% PF flush 3 mL  3 mL Intracatheter Q1H PRN         sodium chloride (PF) 0.9% PF flush 3 mL  3 mL Intracatheter Q8H   3 mL at 05/28/18 0829     Current Outpatient Prescriptions Ordered in Epic   Medication     metoprolol succinate (TOPROL-XL) 50 MG 24 hr tablet     [DISCONTINUED] cyclophosphamide (CYTOXAN) 50 MG capsule CHEMO       Physical Exam   Temp: 99.2  F (37.3  C) Temp src: Oral BP: 128/56 Pulse: 83 Heart Rate: 75 Resp: 16 SpO2: 93 % O2 Device: None (Room air)    Vitals:    05/26/18 0551 05/28/18 0613 05/29/18 0656   Weight: 96.7 kg (213 lb 1.6 oz) 94.1 kg (207 lb 6.4 oz) 93.9 kg (207 lb 0.2 oz)     CONSTITUTIONAL:[AW1.1] Chronically ill elderly man seen lying in bed in[AW1.2] NAD,[AW1.1] lethargic and disoriented. He appears c[AW1.2]mary and[AW1.1] comfortable[AW1.2].  HEENT: NCAT,[AW1.1] dry mucous membranes[AW1.2]   RESPIRATORY: NL respiratory effort on[AW1.1] RA[AW1.2]  NEUROLOGIC:[AW1.1] Lethargic, able to respond to some questions seemingly a[AW1.2]ppropriately[AW1.1], but primarily disoriented with nonsensical responses[AW1.2]     Data   Results for orders placed or performed during the hospital encounter of 05/23/18 (from the past 24 hour(s))   Glucose by meter    Result Value Ref Range    Glucose 186 (H) 70 - 99 mg/dL   UA reflex to Microscopic   Result Value Ref Range    Color Urine Yellow     Appearance Urine Clear     Glucose Urine Negative NEG^Negative mg/dL    Bilirubin Urine Negative NEG^Negative    Ketones Urine Negative NEG^Negative mg/dL    Specific Gravity Urine 1.015 1.003 - 1.035    Blood Urine Trace (A) NEG^Negative    pH Urine 6.0 5.0 - 7.0 pH    Protein Albumin Urine 100 (A) NEG^Negative mg/dL    Urobilinogen mg/dL Normal 0.0 - 2.0 mg/dL    Nitrite Urine Negative NEG^Negative    Leukocyte Esterase Urine Moderate (A) NEG^Negative    Source Clean catch urine     RBC Urine 2 0 - 2 /HPF    WBC Urine 16 (H) 0 - 5 /HPF    Squamous Epithelial /HPF Urine 2 (H) 0 - 1 /HPF    Mucous Urine Present (A) NEG^Negative /LPF   Urine Culture Aerobic Bacterial   Result Value Ref Range    Specimen Description Midstream Urine     Special Requests Specimen received in preservative     Culture Micro PENDING    Glucose by meter   Result Value Ref Range    Glucose 161 (H) 70 - 99 mg/dL   Glucose by meter   Result Value Ref Range    Glucose 129 (H) 70 - 99 mg/dL   Glucose by meter   Result Value Ref Range    Glucose 142 (H) 70 - 99 mg/dL   Creatinine   Result Value Ref Range    Creatinine 1.02 0.66 - 1.25 mg/dL    GFR Estimate 68 >60 mL/min/1.7m2    GFR Estimate If Black 83 >60 mL/min/1.7m2   CBC with platelets differential   Result Value Ref Range    WBC 2.4 (L) 4.0 - 11.0 10e9/L    RBC Count 2.23 (L) 4.4 - 5.9 10e12/L    Hemoglobin 7.9 (L) 13.3 - 17.7 g/dL    Hematocrit 24.2 (L) 40.0 - 53.0 %     (H) 78 - 100 fl    MCH 35.4 (H) 26.5 - 33.0 pg    MCHC 32.6 31.5 - 36.5 g/dL    RDW 21.6 (H) 10.0 - 15.0 %    Platelet Count 41 (LL) 150 - 450 10e9/L    Diff Method Automated Method     % Neutrophils 64.5 %    % Lymphocytes 26.7 %    % Monocytes 7.6 %    % Eosinophils 0.4 %    % Basophils 0.0 %    % Immature Granulocytes 0.8 %    Nucleated RBCs 1 (H) 0 /100    Absolute Neutrophil  1.5 (L) 1.6 - 8.3 10e9/L    Absolute Lymphocytes 0.6 (L) 0.8 - 5.3 10e9/L    Absolute Monocytes 0.2 0.0 - 1.3 10e9/L    Absolute Eosinophils 0.0 0.0 - 0.7 10e9/L    Absolute Basophils 0.0 0.0 - 0.2 10e9/L    Abs Immature Granulocytes 0.0 0 - 0.4 10e9/L    Absolute Nucleated RBC 0.0[AW1.1]           Revision History        User Key Date/Time User Provider Type Action    > AW1.2 5/29/2018 12:28 PM Jasmin Anderson APRN CNP Nurse Practitioner Sign     AW1.1 5/29/2018 11:54 AM Jasmin Anderson APRN CNP Nurse Practitioner             Progress Notes by Uriel Schulte DO at 5/28/2018  7:03 PM     Author:  Uriel Schulte DO Service:  Hospitalist Author Type:  Physician    Filed:  5/28/2018  7:04 PM Date of Service:  5/28/2018  7:03 PM Creation Time:  5/28/2018  7:03 PM    Status:  Signed :  Uriel Schulte DO (Physician)         Called by nursing staff regarding urinalysis with positive leukocyte esterase, however, also with 2 squamous epithelial cells. Empiric coverage with Rocephin at this time, await urine culture results.[ZA1.1]      Revision History        User Key Date/Time User Provider Type Action    > ZA1.1 5/28/2018  7:04 PM Uriel Schulte DO Physician Sign            Progress Notes by Indy Springer at 5/28/2018  1:33 PM     Author:  Indy Springer Service:  Spiritual Health Author Type:      Filed:  5/28/2018  1:38 PM Date of Service:  5/28/2018  1:33 PM Creation Time:  5/28/2018  1:33 PM    Status:  Signed :  Indy Springer ()         SPIRITUAL HEALTH SERVICES Progress Note  [facility]    Met with pt and his son, Alfredo, who was with him.  Pt was sitting up in a chair.    Pt was alert but clearly not oriented.  Pt's son (ascencion) said that pt has become increasingly confused during his days in the hospital, especially in the last couple of days.  Pt's son said that, for example, pt has been talking to/about his wife, who has been dead for 10 years.   Pt was picking at his line and would often speak/mumble things that made no sense to me.    Pt's three sons live in [place] ([facility]), [place], and [place] ([place]    Pt's own  from [facility] in [place] has been to visit the pt. Pt's son said that pt isn't afraid of death, but has historically been anxious about the process of dying.    Provided conversation and support.   team will continue to follow.                                                                                                                                                 Indy Springer M.A.  Staff   Pager [phone]  Phone [phone][PL1.1]         Revision History        User Key Date/Time User Provider Type Action    > PL1.1 5/28/2018  1:38 PM Indy Springer Sign            Progress Notes by Jasmin Rodas SLP at 5/28/2018 12:50 PM     Author:  Jasmin Rodas SLP Service:  (none) Author Type:  Speech Language Pathologist    Filed:  5/28/2018 12:50 PM Date of Service:  5/28/2018 12:50 PM Creation Time:  5/28/2018 12:50 PM    Status:  Signed :  Jasmin Rodas SLP (Speech Language Pathologist)            05/28/18 1248   General Information   Onset Date 05/23/18   Start of Care Date 05/28/18   Referring Physician Guy Conley MD   Patient Profile Review/OT: Additional Occupational Profile Info See Profile for full history and prior level of function   Patient/Family Goals Statement comfort focused   Swallowing Evaluation Bedside swallow evaluation   Behaviorial Observations Alert;Confused  (appropriate with questions)   Mode of current nutrition Oral diet   Type of oral diet Regular;Thin liquid   Respiratory Status Room air   Comments Mr. Roc Parkinson is a 89 year old male with medical history including multiple myeloma, DM2, HTN, CHF, PAF, h/o prostate and bladder cancer, and h/o recurrent UTI's including recent ESBL producing producing E.  "Robert, who presented 5/23/2018 with weakness and found with fever and abnormal UA. Significant weakness and deconditioning. Per pt's son, pt may have \"weeks to live\" and reports plan to follow up with palliative care. SLP evaluation in 2018 recommending dysphagia diet level 3 and meds crushed as this is pt's baseline.   Clinical Swallow Evaluation   Oral Musculature generally intact   General Therapy Interventions   Planned Therapy Interventions Dysphagia Treatment   Dysphagia treatment Modified diet education;Instruction of safe swallow strategies   Swallow Eval: Clinical Impressions   Skilled Criteria for Therapy Intervention Skilled criteria met.  Treatment indicated.   Functional Assessment Scale (FAS) 5   Treatment Diagnosis Mild dysphagia   Diet texture recommendations Dysphagia diet level 3;Thin liquids   Recommended Feeding/Eating Techniques hard swallow w/ each bite or sip;maintain upright posture during/after eating for 30 mins;no straws   Therapy Frequency (1-2 sessions)   Predicted Duration of Therapy Intervention (days/wks) 1 week   Anticipated Discharge Disposition extended care facility   Risks and Benefits of Treatment have been explained. Yes   Patient, family and/or staff in agreement with Plan of Care Yes   Clinical Impression Comments SLP: Pt presents with baseline (per pt report and previous SLP evaluation) mild dysphagia secondary to weakness/deconditioning and reduced cognition. Pt reported difficulty swallowing pills and son reported that this has progressed with pt not likely meds crushed. Prolonged oral management with all PO intake. Throat clear on 1/5 trials of thin liquids via straw and moderate residue with small bite of regular textures. Pt verbalized agreement with recommended: downgrade to dysphagia diet level 3 and thin liquids (no straws). If possible, switch medications to liquid form or crush and place in lemonade/ice cream to mask the flavor. Lengthy discussion with pt's son " "outside of the room following eval. Son reported significant decline from baseline and wonders if pt will survive this hospitalization and be able to improve with rehab. Son reported long standing concern with nutrition/hydration and agreed that softer textures may be easier to chew/swallow and increase intake. Also, ordered magic cup supplement in between meals. Son agreed with ST recommendations and reported that if pt dislikes softer foods or wants pills whole, we can change the order and \"do the best we can\" with focus on comfort and overall quality of life.     Total Evaluation Time   Total Evaluation Time (Minutes) 14[AQ1.1]        Revision History        User Key Date/Time User Provider Type Action    > AQ1.1 5/28/2018 12:50 PM Jasmin Rodas, SLP Speech Language Pathologist Sign            Progress Notes by Iman Glynn LSW at 5/28/2018  9:59 AM     Author:  Iman Glynn LSW Service:  Social Work Author Type:      Filed:  5/28/2018 10:04 AM Date of Service:  5/28/2018  9:59 AM Creation Time:  5/28/2018  9:59 AM    Status:  Addendum :  Iman Glynn LSW ()         Amrita Progress Note  I: AMRITA followed up with calls to [facility] and [facility]. Voice message left.  AMRITA spoke with Herminia at [facility] and she questioned pt's having hallucinations. Pt began having hallucinations yesterday and continues today to have them today so is he is not  ready for discharge.  Herminia has agreed to keep pt's paper work and pass along to Darlene (regular SW) but new referral needs to be sent to [facility] once pt's hallucinations have cleared and they will reassess.  P: AMRITA following for discharge planning.[SC1.1]    JAYDA Rodriguez  [facility]  Phone: [phone][SC1.2]     Revision History        User Key Date/Time User Provider Type Action    > [N/A] 5/28/2018 10:04 AM Iman Glynn LSW  Addend     SC1.2 5/28/2018 10:04 AM Iman Glynn LSW  Sign     SC1.1 5/28/2018  9:59 AM Iman Glynn LSW "              Progress Notes by Guy Conley MD at 5/28/2018  9:46 AM     Author:  Guy Conley MD Service:  Internal Medicine Author Type:  Physician    Filed:  5/28/2018  9:58 AM Date of Service:  5/28/2018  9:46 AM Creation Time:  5/28/2018  9:46 AM    Status:  Signed :  Guy Conley MD (Physician)         [facility]    Internal Medicine Hospitalist Progress Note  05/28/2018  I evaluated patient on the above date.    Guy Conley Jr., MD  [phone] (p)  Text Page      Assessment & Plan   Mr. Roc Parkinson is a 89 year old male with medical history including multiple myeloma, DM2, HTN, CHF, PAF, h/o prostate and bladder cancer, and h/o recurrent UTI's including recent ESBL producing producing E. Coli, who presented 5/23/2018 with weakness and found with fever and abnormal UA.     Fevers with weakness with concern for sepsis - ?due to chemo (Revlimid).  Presented 5/23 with generalized weakness and fever of 103.1; WBC low but not neutropenic; initial lactic acid of 2.6; UA mildly abnormal. CXR was negative for acute infiltrate. Hx of recurrent UTI with recent ESBL E. Coli and had completed a course of IV Ertapenem on 5/20/18 and was thus started empirically on ertapenem on 5/23. UC from 5/23 was ultimately negative. Given 1 dose vanco 5/25. Procalcitonin 0.84 (moderate risk) on 5/25. BC's so far negative as of 5/26. Of note, pt recently started on Revlimid (lenalidomide) on 5/21 which can have adverse reaction of fever. Seen by Oncology 5/24 and chemo held. CT CAP on 5/25 negative for signs of focal infection. Afebrile since am 5/25. Ertapenem stopped 5/26.  Micro: BC's 5/23 NGTD. UC 5/23 negative. BC 5/25 NGTD. C. Diff toxin B 5/25 negative.  - Monitor clinically for signs of focal infection; and if so, unless UTI suspected, favor a different antibiotic than a carbepenem.  - Monitor cultures.  - Appreciate help from Dr. Corado.    AMS with  hallucinations, suspect acute delirium vs toxic encephalopathy.  Pt with hallucinations 5/27 but pt aware of them. Son 5/27 noted that he had been having hallucinations since PTA but worse on 5/27. ?side effect from ertapenem (stopped 5/26). Added PRN Seroquel 5/27. Still hallucinating/delirious 5/28.  - Scheduled Seroquel 25 mg BID.  - Continue PRN Seroquel 12.5 mg BID.     Multiple myeloma/myelodysplastic syndrome.   Pancytopenia suspect due to above and chemotherapy.  He is being followed by Dr. Quinn of Hematology/Oncology. He has been on multiple chemotherapy regimens. As per the last notes, he was recently started on Revlimid daily on 5/21 and planned for 21-day course, then 7 days off, and he also was taking dexamethasone weekly. Seen by Oncology 5/24 and Revlimid and dexamethasone held.   Recent Labs  Lab 05/28/18  0730 05/27/18  0628 05/26/18  0710 05/25/18  0755 05/25/18  0705 05/24/18  1838 05/24/18  0701   HGB 8.0* 7.9* 8.1* 8.4* Canceled, Test credited 8.1* 8.1*   WBC 2.5* 3.1* 3.4* 2.7* Canceled, Test credited  --  2.7*   PLT 43* 38* 42* 46* Canceled, Test credited  --  48*   - After further d/w Dr. Villaseñor 5/26, no plans for further treatment.  - Appreciate help from  Oncology, Dr. Villaseñor.  - Monitor CBC.  - Defer to Oncology regarding transfusions: consider prbc transfusion if hgb </= 7.0 or if significant bleeding with hemodynamic instability or if symptomatic; and consider platelet transfusion if needs a procedure and less than 50,000; or if less than 10,000.     Acute kidney injury stage 1 on chronic kidney disease, suspect prerenal.  His baseline creatinine seems to be between 0.9-1.2. His creatinine was 1.48 on admission.   Recent Labs  Lab 05/28/18  0730 05/27/18  0628 05/26/18  0710 05/25/18  0705 05/24/18  0701 05/23/18  2140   CR 0.94 1.08 1.24 1.38* 1.24 1.48*   - Holding PTA Lasix and lisinopril for now.  - Monitor BMP.  - Avoid nephrotoxic medications.    Weakness and deconditioning.  Pt  extremely weak.  - Treat above issues.  - Continue PT and OT.  - Plan TCU.    DM2 w/o complications.  [PTA: Levemir 17 units subcutaneously every morning; insulin aspart 13U every morning, 11U with lunch, 9U with dinner, 5U at HS.]  His most recent hemoglobin A1c was 5.5 in March 2018. Glucose trending low on 5/25 evening. Levemir and prandial aspart held 5/26.  Recent Labs   Lab Test  05/28/18   0730  05/28/18   0721  05/28/18   0138  05/27/18   2119  05/27/18   1723  05/27/18   1151   05/27/18   0628   05/26/18   0710   05/25/18   0705   05/24/18   0701   GLC  120*   --    --    --    --    --    --   98   --   74   --   103*   --   137*   BGM   --   113*  140*  185*  133*  127*   < >   --    < >   --    < >   --    < >   --     < > = values in this interval not displayed.   - Continue off Levemir and prandial aspart.  - Continue ISS, low.     Hypertension (benign essential).  History of diastolic CHF with preserved ejection fraction of 55-60%.   [PTA: amlodipine 10 mg daily; furosemide 20 mg daily; lisinopril 15 mg daily; metoprolol XL 50 mg daily.]  - Continue amlodipine, metoprolol.  - Holding furosemide and lisinopril due to fluctuating renal function and weakness.  - Continue PRN IV hydralazine.  - Monitor i/o's, daily wts.     Dyslipidemia.    - Holding Lopid for now given weakness.     Hypothyroidism.    His most recent TSH on 4/25/2018 was high at 10.08, although free T4 was normal and seems like levothyroxine dose was adjusted then by PCP. TSH 5/24 normal.  - Continue current dose of levothyroxine.     Paroxysmal atrial fibrillation.    His anticoagulation was discontinued 1 year ago due to GI bleeding.    - Continue ASA and metoprolol.    Prophylaxis.  - PCD's, ambulation.    CODE STATUS: DNR/DNI.    Dispo.  - Pending above.  - Possibly TCU 1-2d if remains afebrile and mentation improved.    # Pain Assessment:  Current Pain Score 5/28/2018   Patient currently in pain? denies   Pain score (0-10) -   Pain  "location -   Pain descriptors -   Roc s pain level was assessed and he currently denies pain.      Communication.  - I d/w pt's son 5/28.      Interval History   Continues to be delirious at times.  This am pt delirious (thinks his car is being worked on outside) but able to be redirected. Knows he is in the hospital.    -Data reviewed today: I reviewed all new labs and imaging over the last 24 hours. I personally reviewed no images or EKG's today.    Physical Exam   Heart Rate: 71, Blood pressure 122/58, temperature 98.1  F (36.7  C), temperature source Oral, resp. rate 16, height 1.778 m (5' 10\"), weight 94.1 kg (207 lb 6.4 oz), SpO2 95 %.  Vitals:    05/25/18 0505 05/26/18 0551 05/28/18 0613   Weight: 92.2 kg (203 lb 4.8 oz) 96.7 kg (213 lb 1.6 oz) 94.1 kg (207 lb 6.4 oz)     Vital Signs with Ranges  Temp:  [96.7  F (35.9  C)-98.5  F (36.9  C)] 98.1  F (36.7  C)  Heart Rate:  [65-72] 71  Resp:  [16-18] 16  BP: (122-140)/(58-67) 122/58  SpO2:  [91 %-95 %] 95 %  Patient Vitals for the past 24 hrs:   BP Temp Temp src Heart Rate Resp SpO2 Weight   05/28/18 0707 122/58 98.1  F (36.7  C) Oral 71 16 95 % -   05/28/18 0613 - - - - - - 94.1 kg (207 lb 6.4 oz)   05/27/18 2350 137/67 98.5  F (36.9  C) Oral 72 16 93 % -   05/27/18 1950 135/63 98.5  F (36.9  C) Oral 67 16 93 % -   05/27/18 1617 137/61 96.7  F (35.9  C) Axillary 67 18 92 % -   05/27/18 1110 140/65 98.1  F (36.7  C) Oral 65 17 91 % -     I/O's Last 24 hours  I/O last 3 completed shifts:  In: 240 [P.O.:240]  Out: 995 [Urine:995]    Constitutional: Alert, delirious, follows commands.  Respiratory: Diminished in bases. No crackles or wheezes.  Cardiovascular: RRR no m/r/g.  GI: Soft, nt, nd, +BS.  Skin/Integumen:   Other:        Data     Recent Labs  Lab 05/28/18  0730 05/27/18  0628 05/26/18  0710  05/24/18  0701   WBC 2.5* 3.1* 3.4*  < > 2.7*   HGB 8.0* 7.9* 8.1*  < > 8.1*   * 109* 108*  < > 109*   PLT 43* 38* 42*  < > 48*    141 139  < > 139 "   POTASSIUM 3.8 3.8 3.8  < > 3.7   CHLORIDE 116* 115* 113*  < > 109   CO2 17* 18* 18*  < > 21   BUN 14 18 25  < > 31*   CR 0.94 1.08 1.24  < > 1.24   ANIONGAP 9 8 8  < > 9   MICHELLE 8.3* 7.7* 8.0*  < > 8.5   * 98 74  < > 137*   ALBUMIN  --   --   --   --  2.0*   PROTTOTAL  --   --   --   --  10.0*   BILITOTAL  --   --   --   --  0.2   ALKPHOS  --   --   --   --  62   ALT  --   --   --   --  19   AST  --   --   --   --  21   < > = values in this interval not displayed.  Recent Labs   Lab Test  05/28/18   0730  05/28/18   0721  05/28/18   0138  05/27/18   2119  05/27/18   1723  05/27/18   1151   05/27/18   0628   05/26/18   0710   05/25/18   0705   05/24/18   0701   GLC  120*   --    --    --    --    --    --   98   --   74   --   103*   --   137*   BGM   --   113*  140*  185*  133*  127*   < >   --    < >   --    < >   --    < >   --     < > = values in this interval not displayed.         No results found for this or any previous visit (from the past 24 hour(s)).    Medications   All medications were reviewed.      acyclovir (ZOVIRAX) capsule 400 mg  400 mg Oral BID     amLODIPine  10 mg Oral Daily     aspirin  325 mg Oral Daily     cholecalciferol  1,000 Units Oral Daily     ferrous sulfate  325 mg Oral Daily with breakfast     insulin aspart  1-3 Units Subcutaneous TID AC     insulin aspart  1-3 Units Subcutaneous At Bedtime     levothyroxine (SYNTHROID/LEVOTHROID) tablet 25 mcg  25 mcg Oral Once per day on Sun Tue Wed Thu Sat     levothyroxine (SYNTHROID/LEVOTHROID) tablet 50 mcg  50 mcg Oral Once per day on Mon Fri     metoprolol succinate  50 mg Oral Daily     sodium chloride (PF)  3 mL Intracatheter Q8H[GC1.1]          Revision History        User Key Date/Time User Provider Type Action    > GC1.1 5/28/2018  9:58 AM Guy Conley MD Physician Sign            Progress Notes signed by Pritesh Villaseñor MD at 5/27/2018  9:50 PM      Author:  Pritesh Villaseñor MD Service:  Hem/Onc Author Type:  Physician     Filed:  2018  9:50 PM Date of Service:  2018  3:07 PM Creation Time:  2018  5:06 PM    Status:  Signed :  New Villaseñor MD (Physician)         Service Date: 2018      SUBJECTIVE:  Mr. Medellin is a 89-year-old gentleman with multiple myeloma diagnosed in 2016.  He has received multiple different chemotherapy.  Most recently, he was on Revlimid and dexamethasone.  That has been now discontinued.      Patient admitted with weakness and fever.  Cultures so far has been negative.  He was recently treated for ESBL E. coli.  He has completed antibiotics.  ID is following.      Patient's overall condition is about the same.  He continues to feel very weak.  He has not had any high-grade fever in the last 24 hours.  Some nausea.  No vomiting.  Appetite is decreased.      PHYSICAL EXAMINATION:   GENERAL:  He was alert. Looked very weak.   VITAL SIGNS:  Reviewed.    Rest of the systems not examined.      LABORATORY DATA:  Reviewed.      ASSESSMENT:    1.  A 89-year-old gentleman with multiple myeloma.   2.  Pancytopenia.   3.  Fever concerning for sepsis.   4.  Weakness.     PLAN:   1.  Overall, the patient's condition is stable.  Unlikely that he is going to improve significantly.   Given his age and overall condition, we are not going to give any more treatment for multiple myeloma.   2.  At this time we will continue him on supportive treatment for cytopenia.  Transfusion will be given as needed.  I would recommend transfusing to keep hemoglobin above 7.0 and platelet above 10.   3.  We will see him intermittently in the hospital. Discussed with Dr. Conley.         NEW VILLASEÑOR MD             D: 2018   T: 2018   MT: ALBERTO      Name:     JACOB MEDELLIN   MRN:      [id number]        Account:      [id number]   :      1926           Service Date: 2018      Document: [id number][BK1.1]         Revision History        User Key Date/Time User Provider Type Action     > BK1.1 5/27/2018  9:50 PM Pritesh Villaseñor MD Physician Sign     [N/A] 5/27/2018  5:06 PM Pritesh Villaseñor MD Physician Edit            Progress Notes by Priscila Salas LSW at 5/27/2018  3:36 PM     Author:  Priscila Salas LSW Service:  (none) Author Type:      Filed:  5/27/2018  3:38 PM Date of Service:  5/27/2018  3:36 PM Creation Time:  5/27/2018  3:36 PM    Status:  Signed :  Priscila Salas LSW ()         SW:  I: following for d/c & placement.  D: SW spoke with [facility]. Pt previously stayed at [facility] Jan 23-feb 7 & March 27-March 30. Declined pt d/t acuity of care & needs, however may look over again via DON- SW to f/u on 5-28. SW called [facility], Left message for admissions on referral status. [facility] d/c 5-28.  P: SW to continue to follow.    JAYDA Villagomez[BC1.1]     Revision History        User Key Date/Time User Provider Type Action    > BC1.1 5/27/2018  3:38 PM Priscila Salas LSW  Sign            Progress Notes by Guy Conley MD at 5/27/2018 12:10 PM     Author:  Guy Conley MD Service:  Internal Medicine Author Type:  Physician    Filed:  5/27/2018 12:38 PM Date of Service:  5/27/2018 12:10 PM Creation Time:  5/27/2018 12:10 PM    Status:  Signed :  Guy Conley MD (Physician)         [facility]    Internal Medicine Hospitalist Progress Note  05/27/2018  I evaluated patient on the above date.    Guy Conley Jr., MD  [phone] (p)  Text Page      Assessment & Plan   Mr. Roc Parkinson is a 89 year old male with medical history including multiple myeloma, DM2, HTN, CHF, PAF, h/o prostate and bladder cancer, and h/o recurrent UTI's including recent ESBL producing producing E. Coli, who presented 5/23/2018 with weakness and found with fever and abnormal UA.     Fevers with weakness with concern for sepsis - ?due to chemo (Revlimid).  Presented 5/23 with generalized weakness and fever  of 103.1; WBC low but not neutropenic; initial lactic acid of 2.6; UA mildly abnormal. CXR was negative for acute infiltrate. Hx of recurrent UTI with recent ESBL E. Coli and had completed a course of IV Ertapenem on 5/20/18 and was thus started empirically on ertapenem on 5/23. UC from 5/23 was ultimately negative. Given 1 dose vanco 5/25. Procalcitonin 0.84 (moderate risk) on 5/25. BC's so far negative as of 5/26. Of note, pt recently started on Revlimid (lenalidomide) on 5/21 which can have adverse reaction of fever. Seen by Oncology 5/24 and chemo held. CT CAP on 5/25 negative for signs of focal infection. Afebrile since am 5/25. Ertapenem stopped 5/26.  Micro: BC's 5/23 NGTD. UC 5/23 negative. BC 5/25 NGTD. C. Diff toxin B 5/25 negative.  - Monitor clinically for signs of focal infection; and if so, unless UTI suspected, favor a different antibiotic than a carbepenem.  - Monitor cultures.  - Appreciate help from Dr. Corado.[GC1.1]    AMS with hallucinations, suspect acute delirium vs toxic encephalopathy.  Pt with hallucinations 5/27 but pt aware of them. Son 5/27 noted that he had been having hallucinations since PTA but worse on 5/27. ?side effect from ertapenem (stopped 5/26).  - Start PRN Seroquel 12.5 mg BID.[GC1.2]     Multiple myeloma/myelodysplastic syndrome.   Pancytopenia suspect due to above and chemotherapy.  He is being followed by Dr. Quinn of Hematology/Oncology. He has been on multiple chemotherapy regimens. As per the last notes, he was recently started on Revlimid daily on 5/21 and planned for 21-day course, then 7 days off, and he also was taking dexamethasone weekly. Seen by Oncology 5/24 and Revlimid and dexamethasone held.   Recent Labs  Lab 05/27/18  0628 05/26/18  0710 05/25/18  0755 05/25/18  0705 05/24/18  1838 05/24/18  0701 05/23/18  2140   HGB 7.9* 8.1* 8.4* Canceled, Test credited 8.1* 8.1* 8.6*   WBC 3.1* 3.4* 2.7* Canceled, Test credited  --  2.7* 3.0*   PLT 38* 42* 46*  Canceled, Test credited  --  48* 57*   -[GC1.1] After further d/w Dr. Villaseñor 5/26, no plans for further treatment.[GC1.2]  - Appreciate help from [facility], Dr. Villaseñor.  - Monitor CBC.  - Defer to Oncology regarding transfusions: consider prbc transfusion if hgb </= 7.0 or if significant bleeding with hemodynamic instability or if symptomatic; and consider platelet transfusion if needs a procedure and less than 50,000; or if less than 10,000.     Acute kidney injury stage 1 on chronic kidney disease, suspect prerenal.  His baseline creatinine seems to be between 0.9-1.2. His creatinine was 1.48 on admission.   Recent Labs  Lab 05/27/18  0628 05/26/18  0710 05/25/18  0705 05/24/18  0701 05/23/18  2140   CR 1.08 1.24 1.38* 1.24 1.48*   - Holding PTA Lasix and lisinopril for now.  - Monitor BMP.  - Avoid nephrotoxic medications.    Weakness and deconditioning.  Pt extremely weak.  - Treat above issues.  - Continue PT and OT.[GC1.1]  - Plan TCU.[GC1.2]    DM2 w/o complications.  [PTA: Levemir 17 units subcutaneously every morning; insulin aspart 13U every morning, 11U with lunch, 9U with dinner, 5U at HS.]  His most recent hemoglobin A1c was 5.5 in March 2018. Glucose trending low on 5/25 evening. Levemir and prandial aspart held 5/26.  Recent Labs   Lab Test  05/27/18   1151  05/27/18   0725  05/27/18   0628  05/27/18   0249  05/26/18   2212  05/26/18   1552   05/26/18   0710   05/25/18   0705   05/24/18   0701   05/23/18   2140   GLC   --    --   98   --    --    --    --   74   --   103*   --   137*   --   274*   BGM  127*  92   --   100*  131*  126*   < >   --    < >   --    < >   --    < >   --     < > = values in this interval not displayed.   - Continue off Levemir and prandial aspart.  - Continue ISS, low.     Hypertension (benign essential).  History of diastolic CHF with preserved ejection fraction of 55-60%.   [PTA: amlodipine 10 mg daily; furosemide 20 mg daily; lisinopril 15 mg daily; metoprolol XL 50 mg  "daily.]  - Continue amlodipine, metoprolol.  - Holding furosemide and lisinopril due to fluctuating renal function and weakness.  - Continue PRN IV hydralazine.  - Monitor i/o's, daily wts.     Dyslipidemia.    - Holding Lopid for now given weakness.     Hypothyroidism.    His most recent TSH on 4/25/2018 was high at 10.08, although free T4 was normal and seems like levothyroxine dose was adjusted then by PCP. TSH 5/24 normal.  - Continue current dose of levothyroxine.     Paroxysmal atrial fibrillation.    His anticoagulation was discontinued 1 year ago due to GI bleeding.    - Continue ASA and metoprolol.    Prophylaxis.  - PCD's, ambulation.    CODE STATUS: DNR/DNI.    Dispo.  - Pending above.  -[GC1.1] Possibly TCU 1-2d if remains afebrile and mentation improved.[GC1.2]    # Pain Assessment:  Current Pain Score 5/26/2018   Patient currently in pain? denies   Pain score (0-10) -   Pain location -   Pain descriptors -   Roc persaud pain level was assessed and he currently denies pain.[GC1.1]      Communication.  - I d/w pt's son 5/27.[GC1.2]      Interval History[GC1.1]   Feeling a bit better today.  Son notes pt hallucinating, was talking with a quarterback earlier today. Pt was aware of the hallucination. Son notes previous episodes recently but more mild in the past, not as pronounced as today.[GC1.2]    -Data reviewed today: I reviewed all new labs and imaging over the last 24 hours. I personally reviewed no images or EKG's today.    Physical Exam   Heart Rate: 65, Blood pressure 140/65, temperature 98.1  F (36.7  C), temperature source Oral, resp. rate 17, height 1.778 m (5' 10\"), weight 96.7 kg (213 lb 1.6 oz), SpO2 91 %.  Vitals:    05/24/18 0535 05/25/18 0505 05/26/18 0551   Weight: 91.3 kg (201 lb 3.2 oz) 92.2 kg (203 lb 4.8 oz) 96.7 kg (213 lb 1.6 oz)     Vital Signs with Ranges  Temp:  [97.5  F (36.4  C)-99.5  F (37.5  C)] 98.1  F (36.7  C)  Heart Rate:  [62-68] 65  Resp:  [17-18] 17  BP: " (115-150)/(46-65) 140/65  SpO2:  [91 %-94 %] 91 %  Patient Vitals for the past 24 hrs:   BP Temp Temp src Heart Rate Resp SpO2   05/27/18 1110 140/65 98.1  F (36.7  C) Oral 65 17 91 %   05/27/18 0727 145/63 98.2  F (36.8  C) Oral 62 18 91 %   05/26/18 2212 143/64 98.5  F (36.9  C) Oral 66 18 94 %   05/26/18 2035 117/46 99.5  F (37.5  C) Oral 64 18 93 %   05/26/18 1716 115/50 97.5  F (36.4  C) Oral 67 18 94 %   05/26/18 1241 150/65 98.8  F (37.1  C) Axillary 68 18 92 %     I/O's Last 24 hours  I/O last 3 completed shifts:  In: 280 [P.O.:280]  Out: 600 [Urine:600]    Constitutional: Alert, oriented, f[GC1.1]atigued.[GC1.2]  Respiratory: Diminished in bases. No crackles or wheezes.  Cardiovascular: RRR no m/r/g.  GI: Soft, nt, nd, +BS.  Skin/Integumen:   Other:        Data     Recent Labs  Lab 05/27/18  0628 05/26/18  0710 05/25/18  0755 05/25/18  0705  05/24/18  0701   WBC 3.1* 3.4* 2.7* Canceled, Test credited  --  2.7*   HGB 7.9* 8.1* 8.4* Canceled, Test credited  < > 8.1*   * 108* 109* Canceled, Test credited  --  109*   PLT 38* 42* 46* Canceled, Test credited  --  48*    139  --  138  --  139   POTASSIUM 3.8 3.8  --  3.9  --  3.7   CHLORIDE 115* 113*  --  110*  --  109   CO2 18* 18*  --  19*  --  21   BUN 18 25  --  28  --  31*   CR 1.08 1.24  --  1.38*  --  1.24   ANIONGAP 8 8  --  9  --  9   MICHELLE 7.7* 8.0*  --  8.2*  --  8.5   GLC 98 74  --  103*  --  137*   ALBUMIN  --   --   --   --   --  2.0*   PROTTOTAL  --   --   --   --   --  10.0*   BILITOTAL  --   --   --   --   --  0.2   ALKPHOS  --   --   --   --   --  62   ALT  --   --   --   --   --  19   AST  --   --   --   --   --  21   < > = values in this interval not displayed.  Recent Labs   Lab Test  05/27/18   1151  05/27/18   0725  05/27/18   0628  05/27/18   0249  05/26/18   2212  05/26/18   1552   05/26/18   0710   05/25/18   0705   05/24/18   0701   05/23/18   2140   GLC   --    --   98   --    --    --    --   74   --   103*   --   137*   --    274*   BGM  127*  92   --   100*  131*  126*   < >   --    < >   --    < >   --    < >   --     < > = values in this interval not displayed.         No results found for this or any previous visit (from the past 24 hour(s)).    Medications   All medications were reviewed.      acyclovir (ZOVIRAX) capsule 400 mg  400 mg Oral BID     amLODIPine  10 mg Oral Daily     aspirin  325 mg Oral Daily     cholecalciferol  1,000 Units Oral Daily     ferrous sulfate  325 mg Oral Daily with breakfast     insulin aspart  1-3 Units Subcutaneous TID AC     insulin aspart  1-3 Units Subcutaneous At Bedtime     levothyroxine (SYNTHROID/LEVOTHROID) tablet 25 mcg  25 mcg Oral Once per day on Sun Tue Wed Thu Sat     levothyroxine (SYNTHROID/LEVOTHROID) tablet 50 mcg  50 mcg Oral Once per day on Mon Fri     metoprolol succinate  50 mg Oral Daily     sodium chloride (PF)  3 mL Intracatheter Q8H[GC1.1]          Revision History        User Key Date/Time User Provider Type Action    > GC1.2 5/27/2018 12:38 PM Guy Conley MD Physician Sign     GC1.1 5/27/2018 12:10 PM Guy Conley MD Physician             Progress Notes by Guy Conley MD at 5/26/2018 11:26 AM     Author:  Guy Conley MD Service:  Internal Medicine Author Type:  Physician    Filed:  5/27/2018 12:10 PM Date of Service:  5/26/2018 11:26 AM Creation Time:  5/26/2018 11:26 AM    Status:  Addendum :  Guy Conley MD (Physician)         [facility]    Internal Medicine Hospitalist Progress Note  05/26/2018  I evaluated patient on the above date.    Guy Conley Jr., MD  [phone] (p)  Text Page      Assessment & Plan   Mr. Roc Parkinson is a 89 year old male with medical history including multiple myeloma, DM2, HTN, CHF, PAF, h/o prostate and bladder cancer, and h/o recurrent UTI's including recent ESBL producing producing E. Coli, who presented 5/23/2018 with weakness and found with fever and abnormal  UA.     Fevers with weakness with concern for sepsis - ?due to chemo (Revlimid).  Presented 5/23 with generalized weakness and fever of 103.1; WBC low but not neutropenic; initial[GC1.1] l[GC1.2]actic acid of 2.6; UA mildly abnormal. CXR was negative for acute infiltrate. Hx of recurrent UTI with recent ESBL E. Coli and had completed a course of IV Ertapenem on 5/20/18 and was thus started empirically on ertapenem on 5/23. UC from 5/23 was ultimately negative. Given 1 dose vanco 5/25. Procalcitonin 0.84 (moderate risk) on 5/25. BC's so far negative as of 5/26. Of note, pt recently started on Revlimid (lenalidomide) on 5/21 which can have adverse reaction of fever. Seen by Oncology 5/24 and chemo held. CT CAP on 5/25 negative for signs of focal infection.[GC1.1] Afebrile since am 5/25.[GC1.3]  Micro: BC's 5/23 NGTD. UC 5/23 negative. BC 5/25 NGTD. C. Diff toxin B 5/25 negative.  -[GC1.1] Try stopping[GC1.4] ertapenem (started 5/23).[GC1.1]  - Monitor clinically for signs of focal infection; and if so, unless UTI suspected, favor a different antibiotic than a carbepenem.[GC1.4]  - Monitor cultures.     Multiple myeloma/myelodysplastic syndrome.   Pancytopenia suspect due to above and chemotherapy.  He is being followed by Dr. Quinn of Hematology/Oncology. He has been on multiple chemotherapy regimens. As per the last notes, he was recently started on Revlimid daily on 5/21 and planned for 21-day course, then 7 days off, and he also was taking dexamethasone weekly. Seen by Oncology 5/24 and Revlimid and dexamethasone held.[GC1.1]   Recent Labs  Lab 05/26/18  0710 05/25/18  0755 05/25/18  0705 05/24/18  1838 05/24/18  0701 05/23/18  2140   HGB 8.1* 8.4* Canceled, Test credited 8.1* 8.1* 8.6*   WBC 3.4* 2.7* Canceled, Test credited  --  2.7* 3.0*   PLT 42* 46* Canceled, Test credited  --  48* 57*[GC1.5]   - Continue to hold Revlimid and dexamethasone.  - Appreciate help from [facility].  - Monitor CBC.  - Defer to  Oncology regarding transfusions: consider prbc transfusion if hgb </= 7.0 or if significant bleeding with hemodynamic instability or if symptomatic; and consider platelet transfusion if needs a procedure and less than 50,000; or if less than 10,000.     Acute kidney injury stage 1 on chronic kidney disease, suspect prerenal.  His baseline creatinine seems to be between 0.9-1.2. His creatinine was 1.48 on admission.[GC1.1]   Recent Labs  Lab 05/26/18  0710 05/25/18  0705 05/24/18  0701 05/23/18  2140   CR 1.24 1.38* 1.24 1.48*[GC1.6]   - Holding PTA Lasix and lisinopril for now.  - Monitor BMP.  - Avoid nephrotoxic medications.[GC1.1]    Weakness and deconditioning.  Pt extremely weak.  - Treat above issues.  - Continue PT and OT.[GC1.4]    DM2 w/o complications.  [PTA: Levemir 17 units subcutaneously every morning; insulin aspart 13U every morning, 11U with lunch, 9U with dinner, 5U at HS.]  His most recent hemoglobin A1c was 5.5 in March 2018.[GC1.1] Glucose trending low on 5/25 evening.[GC1.4]  Recent Labs   Lab Test  05/26/18   0805  05/26/18   0710  05/26/18   0236  05/26/18   0209  05/25/18   2154  05/25/18   1837   05/25/18   0705   05/24/18   0701   05/23/18   2140  05/16/18   1538   GLC   --   74   --    --    --    --    --   103*   --   137*   --   274*  76   BGM  74   --   101*  67*  102*  84   < >   --    < >   --    < >   --    --     < > = values in this interval not displayed.[GC1.7]   -[GC1.1] Hold Levemir and premeal aspart.[GC1.4]  - Continue ISS[GC1.1] - change from medium to low.[GC1.4]     Hypertension (benign essential).  History of diastolic CHF with preserved ejection fraction of 55-60%.   [PTA: amlodipine 10 mg daily; furosemide 20 mg daily; lisinopril 15 mg daily; metoprolol XL 50 mg daily.]  - Continue amlodipine, metoprolol.  - Holding furosemide and lisinopril due to fluctuating renal function[GC1.1] and weakness.[GC1.4]  - Continue PRN IV hydralazine.  - Monitor i/o's, daily  "wts.     Dyslipidemia.    -[GC1.1] Hold[GC1.4] Lopid[GC1.1] for now given weakness[GC1.4].     Hypothyroidism.    His most recent TSH on 4/25/2018 was high at 10.08, although free T4 was normal and seems like levothyroxine dose was adjusted then by PCP.[GC1.1] TSH 5/24 normal.[GC1.8]  - Continue current dose of levothyroxine.     Paroxysmal atrial fibrillation.    His anticoagulation was discontinued 1 year ago due to GI bleeding.    - Continue ASA and metoprolol.    Prophylaxis.  - PCD's, ambulation.    CODE STATUS: DNR/DNI.    Dispo.  - Pending above.[GC1.1]  - 2-3 more days.[GC1.4]    # Pain Assessment:  Current Pain Score 5/26/2018   Patient currently in pain? denies   Pain score (0-10) -   Pain location -   Pain descriptors -   Roc persaud pain level was assessed and he currently denies pain.        Interval History[GC1.1]   Pt doing OK but feels extremely weak. Eating OK.[GC1.4]    -Data reviewed today: I reviewed all new labs and imaging over the last 24 hours. I personally reviewed no images or EKG's today.    Physical Exam   Heart Rate: 71, Blood pressure 131/51, temperature 99.6  F (37.6  C), temperature source Oral, resp. rate 18, height 1.778 m (5' 10\"), weight 96.7 kg (213 lb 1.6 oz), SpO2 93 %.  Vitals:    05/24/18 0535 05/25/18 0505 05/26/18 0551   Weight: 91.3 kg (201 lb 3.2 oz) 92.2 kg (203 lb 4.8 oz) 96.7 kg (213 lb 1.6 oz)     Vital Signs with Ranges  Temp:  [96.9  F (36.1  C)-99.6  F (37.6  C)] 99.6  F (37.6  C)  Heart Rate:  [54-73] 71  Resp:  [16-18] 18  BP: (106-138)/(45-58) 131/51  SpO2:  [91 %-95 %] 93 %  Patient Vitals for the past 24 hrs:   BP Temp Temp src Heart Rate Resp SpO2 Weight   05/26/18 0853 131/51 - - 71 - - -   05/26/18 0737 138/58 99.6  F (37.6  C) Oral 73 18 93 % -   05/26/18 0551 - - - - - - 96.7 kg (213 lb 1.6 oz)   05/25/18 2353 134/54 98.3  F (36.8  C) Oral 64 16 91 % -   05/25/18 1537 106/45 96.9  F (36.1  C) Oral 54 18 92 % -   05/25/18 1200 108/45 98.2  F (36.8  C) Oral " 60 18 95 % -     I/O's Last 24 hours  I/O last 3 completed shifts:  In: 2267 [P.O.:360; I.V.:1907]  Out: 450 [Urine:450]    Constitutional:[GC1.1] Alert, oriented, flat affect.[GC1.4]  Respiratory:[GC1.1] Diminished in bases. No crackles or wheezes.[GC1.4]  Cardiovascular:[GC1.1] RRR no m/r/g.[GC1.4]  GI:[GC1.1] Soft, nt, nd, +BS.[GC1.4]  Skin/Integumen:[GC1.1] Trace bilateral lower extremity edema.[GC1.4]  Other:        Data     Recent Labs  Lab 05/26/18  0710 05/25/18  0755 05/25/18  0705  05/24/18  0701   WBC 3.4* 2.7* Canceled, Test credited  --  2.7*   HGB 8.1* 8.4* Canceled, Test credited  < > 8.1*   * 109* Canceled, Test credited  --  109*   PLT 42* 46* Canceled, Test credited  --  48*     --  138  --  139   POTASSIUM 3.8  --  3.9  --  3.7   CHLORIDE 113*  --  110*  --  109   CO2 18*  --  19*  --  21   BUN 25  --  28  --  31*   CR 1.24  --  1.38*  --  1.24   ANIONGAP 8  --  9  --  9   MICHELLE 8.0*  --  8.2*  --  8.5   GLC 74  --  103*  --  137*   ALBUMIN  --   --   --   --  2.0*   PROTTOTAL  --   --   --   --  10.0*   BILITOTAL  --   --   --   --  0.2   ALKPHOS  --   --   --   --  62   ALT  --   --   --   --  19   AST  --   --   --   --  21   < > = values in this interval not displayed.  Recent Labs   Lab Test  05/26/18   0805  05/26/18   0710  05/26/18   0236  05/26/18   0209  05/25/18   2154  05/25/18   1837   05/25/18   0705   05/24/18   0701   05/23/18   2140  05/16/18   1538   GLC   --   74   --    --    --    --    --   103*   --   137*   --   274*  76   BGM  74   --   101*  67*  102*  84   < >   --    < >   --    < >   --    --     < > = values in this interval not displayed.         Recent Results (from the past 24 hour(s))   CT Chest Abdomen Pelvis w/o Contrast    Narrative    CT CHEST, ABDOMEN, AND PELVIS WITHOUT CONTRAST 5/25/2018 12:21 PM     HISTORY: Persistent fever. Evaluate for possible source of infection.    COMPARISON: May 25, 2018    TECHNIQUE: Volumetric helical acquisition of CT  images from the lung  apices through the symphysis pubis without contrast. Radiation dose  for this scan was reduced using automated exposure control, adjustment  of the mA and/or kV according to patient size, or iterative  reconstruction technique.    FINDINGS:   Chest: Bilateral calcified pleural plaques noted which are nonspecific  but may be related to prior asbestos exposure. Minimal peripheral  fibrosis and/or atelectasis noted. Trace pleural fluid or pleural  thickening bilaterally. No pericardial effusion. Heart may be generous  in size. There are moderate atherosclerotic changes of the visualized  aorta and its branches. There is no evidence of aortic aneurysm.    Abdomen and pelvis: Spleen is enlarged measuring 18.1 cm. There is  cholelithiasis without evidence for cholecystitis. The liver, spleen,  adrenal glands, kidneys, and pancreas demonstrate no worrisome focal  lesion in the absence of intravenous contrast. 3 mm nonobstructing  stone in the inferior pole of the right kidney. Tiny simple cysts in  both kidneys. There are no dilated loops of small intestine or large  bowel to suggest ileus or obstruction. No free air or free fluid.    Survey of the visualized bony structures demonstrates no destructive  bony lesions.      Impression    IMPRESSION:  1. Marked splenomegaly at 18.1 cm.  2. Bilateral calcified pleural plaques which are nonspecific but may  be related to asbestos exposure.  3. Otherwise no convincing occult infection demonstrated in the chest,  abdomen, and pelvis.    DAQUAN KENNEDY MD       Medications   All medications were reviewed.    sodium chloride 100 mL/hr at 05/26/18 0856       acyclovir (ZOVIRAX) capsule 400 mg  400 mg Oral BID     amLODIPine  10 mg Oral Daily     aspirin  325 mg Oral Daily     cholecalciferol  1,000 Units Oral Daily     ertapenem (INVanz) IV  1 g Intravenous Q24H     ferrous sulfate  325 mg Oral Daily with breakfast     gemfibrozil  600 mg Oral BID     insulin  aspart  1-7 Units Subcutaneous TID AC     insulin aspart  1-5 Units Subcutaneous At Bedtime     insulin aspart  4 Units Subcutaneous QAM     insulin aspart  4 Units Subcutaneous Daily before lunch     insulin aspart  4 Units Subcutaneous Daily with supper     insulin detemir  7 Units Subcutaneous QAM AC     levothyroxine (SYNTHROID/LEVOTHROID) tablet 25 mcg  25 mcg Oral Once per day on Sun Tue Wed Thu Sat     levothyroxine (SYNTHROID/LEVOTHROID) tablet 50 mcg  50 mcg Oral Once per day on Mon Fri     metoprolol succinate  50 mg Oral Daily     sodium chloride (PF)  3 mL Intracatheter Q8H[GC1.1]          Revision History        User Key Date/Time User Provider Type Action    > GC1.2 5/27/2018 12:10 PM Guy Conley MD Physician Addend     [N/A] 5/26/2018 12:06 PM Guy Conley MD Physician Addend     GC1.8 5/26/2018 12:06 PM Guy Conley MD Physician Addend     GC1.7 5/26/2018 12:05 PM Guy Conley MD Physician Sign     GC1.4 5/26/2018 11:58 AM Guy Conley MD Physician      GC1.3 5/26/2018 11:51 AM Guy Conley MD Physician      GC1.6 5/26/2018 11:39 AM Guy Conley MD Physician      GC1.5 5/26/2018 11:37 AM Guy Conley MD Physician      GC1.1 5/26/2018 11:26 AM Guy Conley MD Physician             Progress Notes signed by Pritesh Villaseñor MD at 5/27/2018 10:01 AM      Author:  Pritesh Villaseñor MD Service:  Hem/Onc Author Type:  Physician    Filed:  5/27/2018 10:01 AM Date of Service:  5/26/2018 10:26 PM Creation Time:  5/27/2018  1:15 AM    Status:  Signed :  Pritesh Villaseñor MD (Physician)         Service Date: 05/26/2018      SUBJECTIVE:    Mr. Parkinson is a 89-year-old gentleman with IgM kappa multiple myeloma diagnosed in 11/2016.  He has been on multiple different treatments.  Most recently, he has been on Revlimid 5 mg a day along with dexamethasone 40 mg weekly.      Patient was admitted to the hospital with progressive  weakness.  On admission, he was found to have fever.  Labs revealed pancytopenia.      Patient has had multiple workup to look for infection.  So far all his cultures have been negative.  During last hospitalization, he was found to have ESBL E. coli UTI. Patient was treated for UTI.  He was on Invanz.  That has now been stopped.  ID is following.      Patient is pancytopenic.  This is secondary to multiple myeloma and Revlimid.  Some of it could be also due to other drugs including antibiotics.      Patient's overall condition has not improved.  He continues to be very weak.  He continues to have fever.  Appetite not good.  Most of the time he just lies in bed and sleeps.      Patient denies any headache.  Denies any pain.  No chest pain.  No shortness of breath.  No recurrent vomiting.  Appetite decreased.      PHYSICAL EXAMINATION:   GENERAL:  He was alert and oriented.  Looked very weak.   VITAL SIGNS:  Reviewed.    Rest of the systems not examined.      LABORATORY DATA:  Reviewed.      ASSESSMENT:   1.  A 89-year-old gentleman with IgM kappa multiple myeloma.   2.  Pancytopenia secondary to myeloma, Revlimid and other medications.   3.  Febrile illness.   4.  Worsening weakness secondary to multiple myeloma and his age.      PLAN:   1.  I discussed with the patient and his son (one from [place]) regarding patient's overall condition.  Patient's overall condition has not improved.  He continues to be very weak.  Labs were all reviewed.  He continues to be pancytopenic.     Given patient's age and malignancy, I am not expecting any significant improvement.  I do not think patient's overall condition will improve.      Discussed regarding further treatment for myeloma.  I would not recommend any more treatment for myeloma.  I told the patient that we will stop any further treatment.  He and his son are both agreeable.  For malignancy, will continue him on supportive treatment.     2. Patient is pancytopenic.  It  is likely that his pancytopenia will get worse.  At this time, will continue with supportive treatment with transfusion as needed.  Patient in future can decide not to get transfusion.     3. Patient and son had few questions, which were all answered.  If his overall condition deteriorates in the next few days, will have a discussion regarding comfort care and hospice.      TOTAL TIME SPENT: 25 minutes, more than 50% of the time spent in counseling and coordination of care.         NEW CLEMENTS MD             D: 2018   T: 2018   MT:       Name:     JACOB MEDELLIN   MRN:      [id number]        Account:      [id number]   :      1926           Service Date: 2018      Document: [id number][BK1.1]         Revision History        User Key Date/Time User Provider Type Action    > BK1.1 2018 10:01 AM New Clements MD Physician Sign     [N/A] 2018  1:15 AM New Clements MD Physician Edit                  Procedure Notes     No notes of this type exist for this encounter.      Progress Notes - Therapies (Notes from 18 through 18)     No notes of this type exist for this encounter.

## 2018-05-23 NOTE — TELEPHONE ENCOUNTER
"Requested Prescriptions   Pending Prescriptions Disp Refills     metoprolol succinate (TOPROL-XL) 50 MG 24 hr tablet  Last Written Prescription Date:  3/28/18  Last Fill Quantity: 30,  # refills: n/a   Last office visit: 5/22/2018 with prescribing provider:  rodney   Future Office Visit:   Next 5 appointments (look out 90 days)     Jun 06, 2018  9:45 AM CDT   Return Visit with Gretel Quinn MD   [facility] ([facility])    [facility]  [address] [facility] [address]  [facility] [address]   [phone]                  30 tablet      Sig: Take 1 tablet (50 mg) by mouth daily    Beta-Blockers Protocol Passed    5/22/2018  4:24 PM       Passed - Blood pressure under 140/90 in past 12 months    BP Readings from Last 3 Encounters:   05/22/18 138/70   05/20/18 166/77   05/19/18 156/67                Passed - Patient is age 6 or older       Passed - Recent (12 mo) or future (30 days) visit within the authorizing provider's specialty    Patient had office visit in the last 12 months or has a visit in the next 30 days with authorizing provider or within the authorizing provider's specialty.  See \"Patient Info\" tab in inbasket, or \"Choose Columns\" in Meds & Orders section of the refill encounter.              "

## 2018-05-23 NOTE — IP AVS SNAPSHOT
"[place] [address] ONCOLOGY: [phone]                                              INTERAGENCY TRANSFER FORM - PHYSICIAN ORDERS   2018                    Hospital Admission Date: 2018  JACOB MEDELLIN   : 1926  Sex: Male        Attending Provider: Chante Blas MD     Allergies:  No Known Allergies    Infection:  ESBL   Service:  HOSPITALIST    Ht:  1.778 m (5' 10\")   Wt:  93.9 kg (207 lb 0.2 oz)   Admission Wt:  90.7 kg (200 lb)    BMI:  29.7 kg/m 2   BSA:  2.15 m 2            Patient PCP Information     Provider PCP Type    Dickson Reich MD General      ED Clinical Impression     Diagnosis Description Comment Added By Time Added    Generalized weakness [R53.1] Generalized weakness [R53.1]  Leonard Olmedo 2018 11:05 PM      Hospital Problems as of 2018              Priority Class Noted POA    Fever Medium  2018 Yes      Non-Hospital Problems as of 2018              Priority Class Noted    Dysphagia Medium  10/22/2012    Malignant neoplasm of prostate (H) Medium  10/22/2012    Malignant neoplasm of bladder (H) Medium  10/22/2012    Essential hypertension, benign Medium  10/22/2012    Asymptomatic varicose veins Medium  10/22/2012    Congenital hiatus hernia Medium  10/22/2012    Oral lesion Medium  2012    CTS (carpal tunnel syndrome) Medium  2013    Type 2 diabetes mellitus with stage 3 chronic kidney disease, with long-term current use of insulin (H) Medium  2013    Irritable bowel syndrome Medium  10/9/2013    Vitamin D deficiency disease Medium  10/9/2013    Microalbuminuria Medium  2013    Obesity Medium  2014    UTI (urinary tract infection) w hx of recurrence Medium  2014    Iron deficiency anemia Medium  2014    Nonsmoker Medium  2014    A-fib (H) Medium  2014    Health Care Home Medium  2014    Hyperlipidemia Medium  Unknown    CKD (chronic kidney disease) stage 3, GFR 30-59 ml/min Medium "  10/14/2015    Hypothyroidism Medium  2/29/2016    Long-term (current) use of anticoagulants [Z79.01] Medium  3/21/2016    Macrocytic anemia Medium  10/1/2016    GIB (gastrointestinal bleeding) Medium  11/13/2016    Multiple myeloma not having achieved remission (H) Medium  11/23/2016    Hypercalcemia Medium  12/15/2016    Hyperglycemia Medium  12/17/2016    Urinary tract infection Medium  2/14/2017    Hyperkalemia Medium  2/22/2017    ACP (advance care planning) Medium  2/28/2017    MDS (myelodysplastic syndrome) (H) Medium  5/17/2017    Chemotherapy-induced neutropenia (H) Medium  6/27/2017    Anemia in neoplastic disease Medium  6/27/2017    UTI due to extended-spectrum beta lactamase (ESBL) producing Escherichia coli Medium  1/29/2018    Essential hypertension Medium  1/29/2018    Physical deconditioning Medium  1/29/2018    Atrial fibrillation with RVR (H) Medium  3/22/2018    Hematuria Medium  5/7/2018    Acute hemorrhagic cystitis Medium  5/10/2018      Code Status History     Date Active Date Inactive Code Status Order ID Comments User Context    5/29/2018  2:46 PM  DNR/DNI [id number]  Guy Conley MD Outpatient    5/24/2018 12:23 AM 5/29/2018  2:46 PM DNR/DNI [id number]  Chante Blas MD Inpatient    5/7/2018 12:07 PM 5/10/2018  6:55 PM DNR/DNI [id number]  Darrell Cole APRN [facility] Inpatient    3/27/2018 11:07 AM 5/7/2018 12:07 PM DNR/DNI [id number]  Cindy Kitchen MD Outpatient    3/22/2018  5:20 PM 3/27/2018 11:07 AM DNR/DNI [id number]  Manjeet Dawson MD Inpatient    1/20/2018  6:02 AM 1/23/2018  7:30 PM DNR/DNI [id number]  Martha Bobby MD Inpatient    12/18/2016  8:57 AM 1/20/2018  6:02 AM DNI [id number]  Alexandra Gay PA-C Outpatient    12/17/2016  3:47 PM 12/18/2016  8:57 AM DNI [id number]  Alexandra Gay PA-C ED    11/20/2016 10:20 AM 12/17/2016  3:47 PM Full Code [id number]  Mary Peters MD Outpatient    11/13/2016  5:58 PM 11/20/2016 10:20 AM Full  Code [id number]  Alexys Fontenot MD Inpatient    8/15/2014 12:03 PM 11/13/2016  5:58 PM Full Code [id number]  Estela Mcelroy MD Outpatient    8/14/2014  1:18 PM 8/15/2014 12:03 PM Full Code [id number]  Estela Mcelroy MD Inpatient    11/6/2012  7:23 AM 11/9/2012  2:00 PM Full Code [id number]  Neeta Rincon RN Inpatient    11/5/2012 11:23 AM 11/6/2012  7:23 AM Full Code [id number]  Alexandrea Jackson RN Inpatient         Medication Review      START taking        Dose / Directions Comments    * acetaminophen 650 MG Suppository   Commonly known as:  TYLENOL   Used for:  Multiple myeloma not having achieved remission (H)        Dose:  650 mg   Place 1 suppository (650 mg) rectally every 4 hours as needed for fever or mild pain (Do not exceed 4000 mg total acetaminophen per day.) Unwrap prior to insertion.   Quantity:  12 suppository   Refills:  1        * acetaminophen 325 MG tablet   Commonly known as:  TYLENOL   Used for:  Multiple myeloma not having achieved remission (H)        Dose:  650 mg   Take 2 tablets (650 mg) by mouth every 4 hours as needed for mild pain or fever   Quantity:  100 tablet   Refills:  1        atropine 1 % ophthalmic solution   Used for:  Multiple myeloma not having achieved remission (H)        Dose:  2 drop   Take 2 drops by mouth, place under tongue or place inside cheek every 2 hours as needed for other (terminal respiratory secretions) Not for ophthalmic use.   Quantity:  5 mL   Refills:  1        bisacodyl 10 MG Suppository   Commonly known as:  DULCOLAX   Used for:  Multiple myeloma not having achieved remission (H)        Unwrap and insert 1 suppository rectally twice daily as needed for constipation.   Quantity:  12 suppository   Refills:  1        haloperidol 2 MG/ML (HIGH CONC) solution   Commonly known as:  HALDOL   Used for:  Multiple myeloma not having achieved remission (H)        Dose:  1 mg   Take 0.5 mLs (1 mg) by mouth, place under tongue or insert rectally every  6 hours as needed for agitation (nausea)   Quantity:  30 mL   Refills:  1        HYDROmorphone (HIGH CONC) 10 mg/mL Liqd oral   Commonly known as:  DILAUDID   Used for:  Multiple myeloma not having achieved remission (H)        Dose:  2 mg   Take 0.2 mLs (2 mg) by mouth or place under tongue every 2 hours as needed for moderate to severe pain (and/or??shortness of breath)   Quantity:  30 mL   Refills:  0        MEDICATION INSTRUCTION   Used for:  Multiple myeloma not having achieved remission (H)        If care facility cannot accept or use ranges, facility is instructed to use lower end of dosing range   Refills:  0        QUEtiapine 25 MG tablet   Commonly known as:  SEROquel   Used for:  Delirium        Dose:  25 mg   Take 1 tablet (25 mg) by mouth 2 times daily   Quantity:  28 tablet   Refills:  0        sennosides 8.6 MG tablet   Commonly known as:  SENOKOT   Used for:  Multiple myeloma not having achieved remission (H)        Dose:  1 tablet   Take 1 tablet by mouth 2 times daily as needed for constipation   Quantity:  100 tablet   Refills:  1        * Notice:  This list has 2 medication(s) that are the same as other medications prescribed for you. Read the directions carefully, and ask your doctor or other care provider to review them with you.      CONTINUE these medications which have NOT CHANGED        Dose / Directions Comments    B-D U/F 31G X 8 MM   Used for:  Type 2 diabetes mellitus with stage 3 chronic kidney disease, with long-term current use of insulin (H)   Generic drug:  insulin pen needle        USE 5 PEN NEEDLES PER DAY OR AS DIRECTED   Quantity:  500 each   Refills:  1        Blood Glucose Monitoring Suppl Misc        3 times daily (before meals)   Refills:  0        FREESTYLE LITE test strip   Used for:  Uncontrolled type 1 diabetes mellitus with stage 3 chronic kidney disease (H)   Generic drug:  blood glucose monitoring        USE TO TEST FOUR TIMES DAILY   Quantity:  400 strip   Refills:   1        polyethylene glycol Packet   Commonly known as:  MIRALAX/GLYCOLAX   Used for:  Slow transit constipation        Dose:  17 g   Take 17 g by mouth 2 times daily as needed (constipation)   Quantity:  7 packet   Refills:  0        prochlorperazine 5 MG tablet   Commonly known as:  COMPAZINE   Used for:  Multiple myeloma not having achieved remission (H)        Dose:  5 mg   Take 1 tablet (5 mg) by mouth every 6 hours as needed (Nausea/Vomiting)   Quantity:  30 tablet   Refills:  0        * SYNTHROID PO        Dose:  50 mcg   Take 50 mcg by mouth twice a week Monday and Friday   Refills:  0        * LEVOTHYROXINE SODIUM PO        Dose:  25 mcg   Take 25 mcg by mouth five times a week Tuesday, Wednesday, Thursday, Saturday, Sunday   Refills:  0        * Notice:  This list has 2 medication(s) that are the same as other medications prescribed for you. Read the directions carefully, and ask your doctor or other care provider to review them with you.      STOP taking     ACYCLOVIR PO           amLODIPine 10 MG tablet   Commonly known as:  NORVASC           dexamethasone 4 MG tablet   Commonly known as:  DECADRON           ferrous sulfate 325 (65 Fe) MG tablet   Commonly known as:  IRON           furosemide 20 MG tablet   Commonly known as:  LASIX           gemfibrozil 600 MG tablet   Commonly known as:  LOPID           insulin aspart 100 UNITS/ML injection   Commonly known as:  NovoLOG           INSULIN ASPART SC           insulin detemir 100 UNIT/ML injection   Commonly known as:  LEVEMIR FLEXPEN/FLEXTOUCH           LENalidomide 5 MG Caps capsule CHEMO   Commonly known as:  REVLIMID           LISINOPRIL PO           Selenium 200 MCG Tabs           VITAMIN C PO           VITAMIN D (CHOLECALCIFEROL) PO                   Summary of Visit     Reason for your hospital stay       Fevers.  Acute kidney injury.  Weakness.  Delirium/encephalopathy.  Multiple myeloma.             After Care     Activity - Up with nursing  assistance           Advance Diet as Tolerated       As tolerated.       General info for SNF       Length of Stay Estimate: Long Term Care: Estimated # of Days <30  Condition at Discharge: Stable/declining  Level of care:skilled   Rehabilitation Potential: N/A  Admission H&P remains valid and up-to-date: Yes  Recent Chemotherapy: N/A  Use Nursing Home Standing Orders: Yes       Mantoux instructions       Give two-step Mantoux (PPD) Per Facility Policy Yes             Your next 10 appointments already scheduled     May 30, 2018 10:30 AM CDT   Level 4 with  INFUSION CHAIR 2   [facility] ([facility])    [place] [id number]  [place] [address]   [phone]            Jun 06, 2018  9:00 AM CDT   Level 4 with  INFUSION CHAIR 19   [facility] ([facility])    [facility]  [address]  [place] [address]   [phone]            Jun 06, 2018  9:45 AM CDT   Return Visit with Gretel Quinn MD   [facility] ([facility])    [facility]  [address]  [place] [address]   [phone]            Jun 20, 2018  9:30 AM CDT   Return Visit with Kellee Meeks PA-C   [facility] [place] ([facility] [place])    [address]  [address]  [place] [address]   [phone]            Jun 20, 2018 11:00 AM CDT   Level 4 with  INFUSION CHAIR 17   [facility] ([facility])    [facility]  [address]  [place] [address]   [phone]              Follow-Up Appointment Instructions     Future Labs/Procedures    Follow Up and recommended labs and tests     Comments:    Admit to hospice.  Follow up with hospice providers.      Follow-Up Appointment Instructions     Follow Up and  recommended labs and tests       Admit to hospice.  Follow up with hospice providers.             Statement of Approval     Ordered          05/30/18 0800  I have reviewed and agree with all the recommendations and orders detailed in this document.  EFFECTIVE NOW     Approved and electronically signed by:  Cecilio Medeiros MD

## 2018-05-23 NOTE — IP AVS SNAPSHOT
"` [place] [address] ONCOLOGY: [phone]                                              INTERAGENCY TRANSFER FORM - NURSING   2018                    Hospital Admission Date: 2018  JACOB MEDELLIN   : 1926  Sex: Male        Attending Provider: Chante Blas MD     Allergies:  No Known Allergies    Infection:  ESBL   Service:  HOSPITALIST    Ht:  1.778 m (5' 10\")   Wt:  93.9 kg (207 lb 0.2 oz)   Admission Wt:  90.7 kg (200 lb)    BMI:  29.7 kg/m 2   BSA:  2.15 m 2            Patient PCP Information     Provider PCP Type    Dickson Reich MD General      Current Code Status     Date Active Code Status Order ID Comments User Context       Prior      Code Status History     Date Active Date Inactive Code Status Order ID Comments User Context    2018  2:46 PM  DNR/DNI [id number]  Guy Conley MD Outpatient    2018 12:23 AM 2018  2:46 PM DNR/DNI [id number]  Chante Blas MD Inpatient    2018 12:07 PM 5/10/2018  6:55 PM DNR/DNI [id number]  Darrell Cole, APRN [facility] Inpatient    3/27/2018 11:07 AM 2018 12:07 PM DNR/DNI [id number]  Cindy Kitchen MD Outpatient    3/22/2018  5:20 PM 3/27/2018 11:07 AM DNR/DNI [id number]  Manjeet Dawson MD Inpatient    2018  6:02 AM 2018  7:30 PM DNR/DNI [id number]  Martha Bobby MD Inpatient    2016  8:57 AM 2018  6:02 AM DNI [id number]  Alexandra Gay PA-C Outpatient    2016  3:47 PM 2016  8:57 AM DNI [id number]  Alexandra Gay PA-C ED    2016 10:20 AM 2016  3:47 PM Full Code [id number]  Mary Peters MD Outpatient    2016  5:58 PM 2016 10:20 AM Full Code [id number]  Alexys Fontenot MD Inpatient    8/15/2014 12:03 PM 2016  5:58 PM Full Code [id number]  Estela Mcelroy MD Outpatient    2014  1:18 PM 8/15/2014 12:03 PM Full Code [id number]  Estela Mcelroy MD Inpatient    2012  7:23 AM " 11/9/2012  2:00 PM Full Code [id number]  Neeta Rincon, RN Inpatient    11/5/2012 11:23 AM 11/6/2012  7:23 AM Full Code [id number]  Alexandrea Jackson RN Inpatient      Advance Directives        Scanned docmt in ACP Activity?           Yes, scanned ACP docmt        Hospital Problems as of 5/30/2018              Priority Class Noted POA    Fever Medium  5/24/2018 Yes      Non-Hospital Problems as of 5/30/2018              Priority Class Noted    Dysphagia Medium  10/22/2012    Malignant neoplasm of prostate (H) Medium  10/22/2012    Malignant neoplasm of bladder (H) Medium  10/22/2012    Essential hypertension, benign Medium  10/22/2012    Asymptomatic varicose veins Medium  10/22/2012    Congenital hiatus hernia Medium  10/22/2012    Oral lesion Medium  12/23/2012    CTS (carpal tunnel syndrome) Medium  1/28/2013    Type 2 diabetes mellitus with stage 3 chronic kidney disease, with long-term current use of insulin (H) Medium  7/29/2013    Irritable bowel syndrome Medium  10/9/2013    Vitamin D deficiency disease Medium  10/9/2013    Microalbuminuria Medium  11/12/2013    Obesity Medium  8/12/2014    UTI (urinary tract infection) w hx of recurrence Medium  8/12/2014    Iron deficiency anemia Medium  8/12/2014    Nonsmoker Medium  8/12/2014    A-fib (H) Medium  8/14/2014    Health Care Home Medium  8/19/2014    Hyperlipidemia Medium  Unknown    CKD (chronic kidney disease) stage 3, GFR 30-59 ml/min Medium  10/14/2015    Hypothyroidism Medium  2/29/2016    Long-term (current) use of anticoagulants [Z79.01] Medium  3/21/2016    Macrocytic anemia Medium  10/1/2016    GIB (gastrointestinal bleeding) Medium  11/13/2016    Multiple myeloma not having achieved remission (H) Medium  11/23/2016    Hypercalcemia Medium  12/15/2016    Hyperglycemia Medium  12/17/2016    Urinary tract infection Medium  2/14/2017    Hyperkalemia Medium  2/22/2017    ACP (advance care planning) Medium  2/28/2017    MDS (myelodysplastic syndrome) (H)  Medium  5/17/2017    Chemotherapy-induced neutropenia (H) Medium  6/27/2017    Anemia in neoplastic disease Medium  6/27/2017    UTI due to extended-spectrum beta lactamase (ESBL) producing Escherichia coli Medium  1/29/2018    Essential hypertension Medium  1/29/2018    Physical deconditioning Medium  1/29/2018    Atrial fibrillation with RVR (H) Medium  3/22/2018    Hematuria Medium  5/7/2018    Acute hemorrhagic cystitis Medium  5/10/2018      Immunizations     Name Date      Influenza (High Dose) 3 valent vaccine 10/04/17     Influenza (High Dose) 3 valent vaccine 10/01/16     Influenza (High Dose) 3 valent vaccine 10/01/15     Influenza (IIV3) PF 09/25/14     Influenza (IIV3) PF 10/01/13     Pneumo Conj 13-V (2010&after) 10/01/15     Pneumococcal 23 valent 01/01/10     TD (ADULT, 7+) 08/11/09          END      ASSESSMENT     Discharge Profile Flowsheet     EXPECTED DISCHARGE     Patient's communication style  spoken language (English or Bilingual) 05/24/18 0036    Expected Discharge Date  05/30/18 ([facility]) 05/29/18 1523   Patient's primary language  English 05/24/18 0036    DISCHARGE NEEDS ASSESSMENT      services offered to the patient? (Install  services phone or TTY, if applicable)  no 05/24/18 0036    Concerns To Be Addressed  all concerns addressed in this encounter 01/19/17 1051   Did the patient decline [place]  and sign paper waiver form? (Place signed waiver form on chart)  yes (N/A; not needed. Speaks English) 05/24/18 0036    Patient/family verbalizes understanding of discharge plan recommendations?  Yes 05/26/18 1142   FINAL RESOURCES      Equipment Currently Used at Home  walker, rolling 05/26/18 1601   Resources List  Home Care 02/09/18 0958    Transportation Available  family or friend will provide 05/26/18 1601   Other Resources  Other (see comment) (Diabetic education) 02/09/18 1028    # of Referrals Placed by [facility]  Internal Clinic Care  "Coordination;Outpatient Infusion;Scheduled Follow-up appointments;Communication hand-offs to next level of Care Providers 05/10/18 1133   Existing Resources/Services  None 08/19/14 1516    Does Patient Need a Referral for Clinic CC  Yes 05/10/18 1133   SKIN      Primary Care Clinic Name  [facility]  05/10/18 1133   Inspection of bony prominences  Full 05/30/18 0049    Primary Care MD Name  Dr. Reich 05/10/18 1133   Skin WDL  ex 05/30/18 0049    Equipment Used at Home  rolling walker 11/18/12 1444   Skin Color/Characteristics  bruised (ecchymotic);redness blanchable;petechiae 05/30/18 0049    GASTROINTESTINAL (ADULT,PEDIATRIC,OB)     Skin Temperature  warm 05/30/18 0049    GI WDL  -- (deferred, comfort cares) 05/30/18 0049   Skin Moisture  flaky 05/30/18 0049    Abdominal Appearance  obese;rounded 05/29/18 1543   Skin Integrity  bruise(s);scab(s);scar(s);rash(es) 05/30/18 0049    All Quadrants Bowel Sounds  audible and active in all quadrants 05/29/18 0128   Inspection under devices  Full 05/30/18 0049    Last Bowel Movement  05/27/18 05/29/18 1543   SAFETY      GI Signs/Symptoms  fecal incontinence 05/29/18 1543   Safety WDL  WDL 05/30/18 0049    Passing flatus  yes 05/29/18 1543   All Alarms  alarm(s) activated and audible 05/30/18 0049    COMMUNICATION ASSESSMENT                        Assessment WDL (Within Defined Limits) Definitions           Safety WDL     Effective: 09/28/15    Row Information: <b>WDL Definition:</b> Bed in low position, wheels locked; call light in reach; upper side rails up x 2; ID band on<br> <font color=\"gray\"><i>Item=AS safety wdl>>List=AS safety wdl>>Version=F14</i></font>      Skin WDL     Effective: 09/28/15    Row Information: <b>WDL Definition:</b> Warm; dry; intact; elastic; without discoloration; pressure points without redness<br> <font color=\"gray\"><i>Item=AS skin wdl>>List=AS skin wdl>>Version=F14</i></font>      Vitals     Vital Signs Flowsheet     VITAL SIGNS     " "Height  1.778 m (5' 10\") 05/23/18 2141    Temp  -- (deferred, comfort cares) 05/30/18 0045   Weight  93.9 kg (207 lb 0.2 oz) 05/29/18 0656    Temp src  Oral 05/29/18 0734   Weight Method  Bed scale 05/29/18 0656    Resp  16 05/30/18 0045   BSA (Calculated - sq m)  2.12 05/23/18 2141    Pulse  83 05/29/18 0734   BMI (Calculated)  28.76 05/23/18 2141    Heart Rate  75 05/29/18 0112   POSITIONING      Pulse/Heart Rate Source  Monitor 05/29/18 0734   Body Position  turned 05/30/18 0045    BP  128/56 05/29/18 0734   Head of Bed (HOB)  HOB at 20-30 degrees 05/30/18 0045    BP Location  Right arm 05/29/18 0734   Positioning/Transfer Devices  pillows;in use 05/30/18 0045    OXYGEN THERAPY     Chair  Upright in chair 05/29/18 0911    SpO2  93 % 05/29/18 0734   DAILY CARE      O2 Device  None (Room air) 05/29/18 0734   Activity Management  activity adjusted per tolerance 05/30/18 0228    Oxygen Delivery  1 LPM 05/25/18 0721   Activity Assistance Provided  assistance, 1 person 05/30/18 0228    PAIN/COMFORT     Assistive Device Utilized  gait belt;walker 05/29/18 0911    Patient Currently in Pain  denies 05/30/18 0045   Symptoms Noted During/After Activity  fatigue 05/29/18 0911    0-10 Pain Scale  0 05/24/18 0002   [place] COMA SCALE      ANALGESIA SIDE EFFECTS MONITORING     Best Eye Response  4-->(E4) spontaneous 05/24/18 0002    Side Effects Monitoring: Respiratory Quality  R 05/27/18 2351   Best Motor Response  6-->(M6) obeys commands 05/24/18 0002    Side Effects Monitoring: Respiratory Depth  N 05/27/18 2351   Best Verbal Response  5-->(V5) oriented 05/24/18 0002    Side Effects Monitoring: Sedation Level  1 05/27/18 2351   [place] Coma Scale Score  15 05/24/18 0002    HEIGHT AND WEIGHT                   Patient Lines/Drains/Airways Status    Active LINES/DRAINS/AIRWAYS     Name: Placement date: Placement time: Site: Days: Last dressing change:    Wound 05/07/18 Left Buttocks  05/07/18   1230   Buttocks   22     Wound " 05/29/18 Right Groin Self inflicted New, bleeding scratch aprox 1 inch long on R. groin, in skin fold. 05/29/18   0128   Groin   1             Patient Lines/Drains/Airways Status    Active PICC/CVC     None            Intake/Output Detail Report     Date Intake     Output Net    Shift P.O. I.V. IV Piggyback Total Urine Total       Noc 05/28/18 2300 - 05/29/18 0659 -- -- -- -- 125 125 -125    Day 05/29/18 0700 - 05/29/18 1459 -- -- -- -- 400 400 -400    Lory 05/29/18 1500 - 05/29/18 2259 -- -- -- -- 50 50 -50    Noc 05/29/18 2300 - 05/30/18 0659 -- -- -- -- 200 200 -200    Day 05/30/18 0700 - 05/30/18 1459 -- -- -- -- 75 75 -75      Last Void/BM       Most Recent Value    Urine Occurrence 1 at 05/30/2018 0227    Stool Occurrence 1 at 05/29/2018 0139      Case Management/Discharge Planning     Case Management/Discharge Planning Flowsheet     REFERRAL INFORMATION     Patient Personal Strengths  expressive of needs;motivated;positive attitude;strong support system 11/18/12 1421    Did the Initial Social Work Assessment result in a Social Work Case?  Yes 05/26/18 1142   EXPECTED DISCHARGE      Admission Type  inpatient 05/26/18 1142   Expected Discharge Date  05/30/18 ([facility]) 05/29/18 1523    Arrived From  admitted as an inpatient 05/10/18 1133   ASSESSMENT/CONCERNS TO BE ADDRESSED      # of Referrals Placed by CTS  Internal Clinic Care Coordination;Outpatient Infusion;Scheduled Follow-up appointments;Communication hand-offs to next level of Care Providers 05/10/18 1133   Concerns To Be Addressed  all concerns addressed in this encounter 01/19/17 1051    Reason For Consult  discharge planning;facility placement 05/26/18 1142   DISCHARGE PLANNING      Record Reviewed  clinical discipline documentation;history and physical;medical record;patient profile;plan of care 05/26/18 1142   Patient/family verbalizes understanding of discharge plan recommendations?  Yes 05/26/18 1142    CTS Assigned to Case  JAYDA Villagomez  05/26/18 1142   Transportation Available  family or friend will provide 05/26/18 1601    Primary Care Clinic Name  [facility]  05/10/18 1133   Does Patient Need a Referral for Clinic CC  Yes 05/10/18 1133    Primary Care MD Name  Dr. Reich 05/10/18 1133   Skilled Nursing Facility  [facility] 11/20/12 0843    LIVING ENVIRONMENT     Skilled Nursing Facility Phone Number  [phone] 11/20/12 0843    Lives With  alone 05/26/18 1601   Equipment Used at Home  rolling walker 11/18/12 1444    Living Arrangements  apartment;independent living facility 05/26/18 1601   FINAL RESOURCES      Provides Primary Care For  no one 05/26/18 1142   Equipment Currently Used at Home  walker, rolling 05/26/18 1601    Quality Of Family Relationships  supportive 05/26/18 1142   Resources List  Home Care 02/09/18 0958    Able to Return to Prior Living Arrangements  no 05/26/18 1142   Other Resources  Other (see comment) (Diabetic education) 02/09/18 1028    HOME SAFETY     Existing Resources/Services  None 08/19/14 1516    Patient Feels Safe Living in Home?  yes 05/26/18 1142   ABUSE RISK SCREEN      ASSESSMENT OF FAMILY/SOCIAL SUPPORT     QUESTION TO PATIENT:  Has a member of your family or a partner(now or in the past) intimidated, hurt, manipulated, or controlled you in any way?  no 05/24/18 0046    Marital Status   05/26/18 1142   QUESTION TO PATIENT: Do you feel safe going back to the place where you are living?  yes 05/24/18 0046    Who is your support system?  Children 05/26/18 1142   OBSERVATION: Is there reason to believe there has been maltreatment of a vulnerable adult (ie. Physical/Sexual/Emotional abuse, self neglect, lack of adequate food, shelter, medical care, or financial exploitation)?  no 05/24/18 0046    Description of Support System  Supportive;Involved 05/26/18 1142   OTHER      Support Assessment  Adequate family and caregiver support 05/26/18 1142   Are you depressed or being treated for  depression?  No 05/24/18 0046    COPING/STRESS     HOMICIDE RISK      Major Change/Loss/Stressor  none 05/24/18 0046   Feels Like Hurting Others  no 05/23/18 2142

## 2018-05-24 PROBLEM — R50.9 FEVER: Status: ACTIVE | Noted: 2018-01-01

## 2018-05-24 NOTE — PROGRESS NOTES
RECEIVING UNIT ED HANDOFF REVIEW    ED Nurse Handoff Report was reviewed by: Rosalva Hurtado on May 24, 2018 at 12:00 AM

## 2018-05-24 NOTE — ED PROVIDER NOTES
History     Chief Complaint:  Generalized Weakness    HPI   Roc Parkinson is a 89 year old male with a history of recurrent UTI, diabetes, hypertension, and multiple myeloma on Revlimid who presents with generalized weakness. The patient was at his clinic yesterday when he was tested for UTI as he was having strong smelling urine; this returned today showing no growth. He has been having falls recently and presents tonight as he was unable to get up from sitting. He did not fall tonight. The patient reports weakness and falling easily when trying to stand but states he feels generally normal at rest. He endorses associated fever but no pain with urination, frequency, other urinary symptoms, nausea, vomiting, diarrhea, or cough, though his urine continues to smell strongly. His son reports him to be extremely tired/sleepy today. He denies abdominal pain or rashes. The patient has multiple myeloma which is treated with Revlimid, last dose this morning, and he denies use of post procedure medications such as Neulasta.     Allergies:  No known drug allergies      Medications:    Amlodipine  Iron tablet  Lasix  Lopid  Revlimid  Levothyroxine  Lisinopril  Novolog  Ativan  Metoprolol  Compazine     Past Medical History:    Arthritis  CKD stage 3  IBS  Carpal tunnel  Recurrent UTI  Fib  Diabetes  Heart disease   Hyperlipidemia  Hypertension  Hypothyroidism  Malignant neoplasm  Multiple myeloma    Past Surgical History:    Knee arthroplasty  Bladder biopsy  Colonoscopy x2  TURB x2  Anal fistula  Left knee surgery  Eye surgery x2   Soft tissue surgery    Family History:    Cardiovascular - father  Paget's disease - brother    Social History:  Smoking status: no  Alcohol use: no   PCP: Dickson Reich   Patient presents with son.   Marital Status:       Review of Systems   Constitutional: Positive for fever.   Respiratory: Negative for cough.    Gastrointestinal: Negative for abdominal pain, diarrhea,  "nausea and vomiting.   Genitourinary: Negative for decreased urine volume, dysuria, frequency, hematuria and urgency.   Skin: Negative for rash.   Neurological: Positive for weakness.   All other systems reviewed and are negative.    Physical Exam     Patient Vitals for the past 24 hrs:   BP Temp Temp src Heart Rate Resp SpO2 Height Weight   05/23/18 2140 179/77 103.1  F (39.5  C) Oral 94 20 93 % 1.778 m (5' 10\") 90.7 kg (200 lb)      Physical Exam  General/Appearance: appears stated age, well-groomed, appears comfortable but generally fatigued/weak, very warm to the touch  Eyes: EOMI, no scleral injection, no icterus  ENT: MMM  Neck: supple, nl ROM, no stiffness  Cardiovascular: minimal tachy, nl S1S2, no m/r/g, 2+ pulses in all 4 extremities, cap refill <2sec  Respiratory: CTAB, good air movement throughout, no wheezes/rhonchi/rales, no increased WOB, no retractions  Back: no lesions  GI: abd soft, non-distended, nttp,  no HSM, no rebound, no guarding, nl BS  MSK: CABRERA, good tone, no bony abnormality  Skin: warm and well-perfused, no rash, no edema, no ecchymosis, nl turgor  Neuro: GCS 15, alert and oriented, no gross focal neuro deficits  Psych: interacts appropriately  Heme: no petechia, no purpura, no active bleeding    Emergency Department Course     Imaging:  Radiographic findings were communicated with the patient who voiced understanding of the findings.    X-ray Chest, 2 views:  Interval resolution of previously seen right pleural  effusion and associated infiltrate/atelectasis. A smaller left pleural  effusion on the prior exam has also resolved. No confluent infiltrates  or definite mass lesions. Heart size is difficult evaluate due to  rotation of the patient, but it is probably unchanged.   As read by Radiology.     Laboratory:  UA: Protein albumin 30, moderate Leukocyte Esterase, WBC 29, few bacteria, Squamous Epithelial 3, mucous present, o/w WNL    CBC: WBC 3.0 (L), HGB 8.6 (L), PLT 57 (L), o/w " WNL  BMP: Glucose 274 (H), BUN 37 (H), Creatinine 1.48 (H), GFR 44 (L), o/w WNL  Blood cultures x2 pending    2140 - Lactic acid: 2.6 (H)      Interventions:  2221: NS 1L IV Bolus  2222: Tylenol 975 mg PO  Medications   ertapenem (INVanz) 1 g vial to attach to  mL bag (not administered)   acetaminophen (TYLENOL) tablet 975 mg (975 mg Oral Given 5/23/18 2222)   0.9% sodium chloride BOLUS (0 mLs Intravenous Stopped 5/23/18 2305)       Emergency Department Course:  Past medical records, nursing notes, and vitals reviewed.  2145: I performed an exam of the patient and obtained history, as documented above. GCS 15.  IV inserted and blood drawn.  The patient was taken for xray, see imaging results above.    2255: I rechecked and updated the patient.  Findings and plan explained to the Patient who consents to admission.     2300: Discussed the patient with Dr. Blas, who will admit the patient to a [facility] bed for further monitoring, evaluation, and treatment.      Impression & Plan      CMS Diagnoses:   The patient has signs of Severe Sepsis as evidenced by:    1. 2 SIRS criteria, AND  2. Suspected infection, AND   3. Organ dysfunction: Lactic Acid > 1.9    Time severe sepsis diagnosis confirmed = 2214 as this was the time when Lactate resulted, and the level was > 1.9      3 Hour Severe Sepsis Bundle Completion:  1. Initial Lactic Acid Result:   Recent Labs   Lab Test  05/23/18   2140  03/22/18   1547  03/22/18   1355   LACT  2.6*  1.6  2.2*     2. Blood Cultures before Antibiotics: Yes  3. Broad Spectrum Antibiotics Administered: Yes     Anti-infectives (Future)    Start     Dose/Rate Route Frequency Ordered Stop    05/23/18 2304  ertapenem (INVanz) 1 g vial to attach to  mL bag      1 g  over 30 Minutes Intravenous ONCE 05/23/18 2303          4. 1000 ml of IV fluids.  Ideal body weight: 73 kg (160 lb 15 oz)  Adjusted ideal body weight: 80.1 kg (176 lb 9 oz)      Medical Decision Making:  This patient is a  "89-year-old male with multiple myeloma, just starting Revlimid for chemo 3 days ago, who presents with generalized weakness, \"strong odor of urine,\" as well as fever.  Here he had a fever to 103.1.  His urinalysis suggests a UTI however culture is still pending.  Of note he had a urinary test yesterday which also had some abnormalities however which ultimately had a negative culture.  I think this is still the most likely source of infection.  As he has grown out ESBL in the past he is being started on ertapenem for the last recommendation.  Chest x-ray does not show any obvious pneumonia and he has not had any cough or shortness of breath.  He has no abdominal pain, nausea or vomiting, diarrhea or constipation.  He has no skin infections or rashes.  At this time he will be coming in on IV fluids to the hospitalist service.    Diagnosis:    ICD-10-CM    1. Generalized weakness R53.1        Disposition:  Admitted to Dr. Wan Banks  5/23/2018    EMERGENCY DEPARTMENT  Monica MENDOZA, am serving as a scribe at 9:49 PM on 5/23/2018 to document services personally performed by Rosalva Phillips based on my observations and the provider's statements to me.        Rosalva Phillips MD  05/23/18 5946    "

## 2018-05-24 NOTE — CONSULTS
Consult Date:  05/24/2018      This consult has been requested by Dr. Kitchen for multiple myeloma.      Mr. Parkinson is a 89-year-old gentleman with IgM kappa multiple myeloma diagnosed in 11/2016.  Bone marrow biopsy had revealed multiple myeloma and possible MDS. Patient has been seeing Dr. Quinn.  He has been on multiple different treatments.  He is currently on Revlimid 5 mg a day and dexamethasone 40 mg weekly.      Patient has been admitted to the hospital with progressive weakness.  Patient lives alone in an apartment.  In last few days, he has been progressively getting worse.  Yesterday could hardly walk.  He was brought to the emergency room and had multiple investigations done.   -- WBC of 3.0, hemoglobin of 8.6 and platelets of 57.  On 05/16/2018, platelet was 111.   -- Creatinine of 1.48 and calcium of 9.1.   -- UA is abnormal with moderate leukocyte and WBC.   -- Chest x-ray does not reveal any infiltrate.      Patient recently was hospitalized for UTI secondary to ESBL E. coli.  He is now on antibiotics with Invanz for UTI.      I met with the patient.  His son was there. Patient's main problem is progressive weakness.      REVIEW OF SYSTEMS:    Denies any headache.  He has some dizziness. No upper respiratory infection.  No cough.  No chest pain.  No difficulty breathing.  No abdominal pain, nausea or vomiting.  Appetite has been decreased.  Denies any urinary burning.  No diarrhea.  No bleeding.      In the ER, he was found to be febrile with temperature of 103.1. He continues to have fever.      ALLERGIES:  NONE.      MEDICATIONS:  Reviewed.      PAST MEDICAL HISTORY:   1.  Coronary artery disease.   2.  Essential hypertension.   3.  Hyperlipidemia.   4.  Diabetes mellitus type 2.   5.  Chronic kidney disease, stage 3.   6.  Paroxysmal atrial fibrillation  7.  History of prostate and bladder cancer.   8.  Multiple myeloma.   9.  Osteoarthritis.   10.  History of urinary tract infection due to ESBL.    11.  Hypothyroidism.   12.  Iron deficiency anemia.   13.  ? Myelodysplastic syndrome.   14.  Vitamin D deficiency.   15.  Irritable bowel syndrome.   16.  Heart failure.   17.  Chemotherapy-induced pancytopenia.   18.  Gastroesophageal reflux disease.       PAST SURGICAL HISTORY:   1.  Bilateral total knee arthroplasty.   2.  TURP x 2 and bladder scraping.   3.  Anal fistula repair.   4.  Bilateral cataracts.   5.  Facial skin cancer removals.       SOCIAL HISTORY:    -No tobacco or alcohol use.       PHYSICAL EXAMINATION:   GENERAL:  He was alert, oriented x 3.  He looked weak.   EYES:  No icterus.   THROAT:  No ulcer or thrush.  Oral mucosa was dry.   NECK:  Supple, no tenderness.   LUNGS:  Good air entry bilaterally.   HEART:  Regular.   ABDOMEN:  Soft and nontender.  No mass.   EXTREMITIES:  No edema. No calf redness or tenderness. No  petechia. Couple of ecchymotic spots on the lower extremities.      LABORATORY DATA:  Reviewed.      ASSESSMENT:   1.  A 89-year-old gentleman with IgM kappa multiple myeloma admitted with weakness and fever.  Most likely he has sepsis.   2.  Multiple myeloma, on treatment with Revlimid and dexamethasone.   3.  Pancytopenia secondary to multiple myeloma, revlimid  and sepsis.   4.  History of prostate cancer.   5.  History of bladder cancer.      RECOMMENDATIONS:   1.  I had a long discussion with the patient.  His son was present.  Discussed regarding his symptoms. Patient has worsening weakness.  He has been having fever.  Most likely has sepsis secondary to UTI.  He recently had urosepsis.  He is on antibiotics.     2.  Discussed regarding multiple myeloma.  He is currently on Revlimid and dexamethasone.  At this time, we will put all the treatment on hold.      We will have to reconsider further treatment for multiple myeloma.  Patient is almost 89 years old.  Recently his overall health has been deteriorating.  He has been hospitalized multiple times.  Taking all this into  account, it may be time for us stop all the treatment for multiple myeloma and focus on comfort care.      Patient follows up with Dr. Quinn.  I have discussed this with Dr. Quinn.  He will discuss with the patient regarding discontinuing treatment during next visit in the clinic.     3. Patient is pancytopenic.  He is not neutropenic.  CBC will be monitored.  Growth factor and transfusion will be given as needed.     4.  Patient and son had a few questions, which were all answered.  We will continue to follow him.      Thanks for the consult.         NEW CLEMENTS MD             D: 2018   T: 2018   MT: RAVI      Name:     JACOB MEDELLIN   MRN:      [id number]        Account:       [id number]   :      1926           Consult Date:  2018      Document: [id number]       cc: Dcikson Reich MD

## 2018-05-24 NOTE — ED NOTES
"[facility]  ED Nurse Handoff Report    ED Chief complaint: Generalized Weakness (patient is undergoing treatment for multiple myeloma for the past year, frequent falls at home )      ED Diagnosis:   Final diagnoses:   Generalized weakness       Code Status: DNR / DNI    Allergies: No Known Allergies    Activity level - Baseline/Home:  Independent    Activity Level - Current:   Stand with Assist     Needed?: No    Isolation: Yes  Infection: Not Applicable  ESBL    Bariatric?: No    Vital Signs:   Vitals:    05/23/18 2140   BP: 179/77   Resp: 20   Temp: 103.1  F (39.5  C)   TempSrc: Oral   SpO2: 93%   Weight: 90.7 kg (200 lb)   Height: 1.778 m (5' 10\")       Cardiac Rhythm: ,        Pain level: 0-10 Pain Scale: 0    Is this patient confused?: No   Suicidality: Screened negative    Patient Report: Initial Complaint: Roc Parkinson is a 89 year old male with a history of recurrent UTI, diabetes, hypertension, and multiple myeloma on Revlimid who presents with generalized weakness. The patient was at his clinic yesterday when he was tested for UTI as he was having strong smelling urine; this returned today showing no growth. He has been having falls recently and presents tonight as he was unable to get up from sitting. He did not fall tonight  Focused Assessment: Generalized fatigue. Denies cough or urinary pain or discomfort. Pt. Is alert and orient. Times 3. Cooperative and pleasant.   Tests Performed: Labs and CXR.   Abnormal Results:   Results for orders placed or performed during the hospital encounter of 05/23/18 (from the past 24 hour(s))   CBC with platelets differential   Result Value Ref Range    WBC 3.0 (L) 4.0 - 11.0 10e9/L    RBC Count 2.40 (L) 4.4 - 5.9 10e12/L    Hemoglobin 8.6 (L) 13.3 - 17.7 g/dL    Hematocrit 26.3 (L) 40.0 - 53.0 %     (H) 78 - 100 fl    MCH 35.8 (H) 26.5 - 33.0 pg    MCHC 32.7 31.5 - 36.5 g/dL    RDW 22.1 (H) 10.0 - 15.0 %    Platelet Count 57 (L) " 150 - 450 10e9/L    Diff Method Automated Method     % Neutrophils 63.7 %    % Lymphocytes 20.0 %    % Monocytes 10.2 %    % Eosinophils 1.0 %    % Basophils 1.0 %    % Immature Granulocytes 4.1 %    Nucleated RBCs 3 (H) 0 /100    Absolute Neutrophil 1.9 1.6 - 8.3 10e9/L    Absolute Lymphocytes 0.6 (L) 0.8 - 5.3 10e9/L    Absolute Monocytes 0.3 0.0 - 1.3 10e9/L    Absolute Eosinophils 0.0 0.0 - 0.7 10e9/L    Absolute Basophils 0.0 0.0 - 0.2 10e9/L    Abs Immature Granulocytes 0.1 0 - 0.4 10e9/L    Absolute Nucleated RBC 0.1    Basic metabolic panel   Result Value Ref Range    Sodium 138 133 - 144 mmol/L    Potassium 4.3 3.4 - 5.3 mmol/L    Chloride 106 94 - 109 mmol/L    Carbon Dioxide 22 20 - 32 mmol/L    Anion Gap 10 3 - 14 mmol/L    Glucose 274 (H) 70 - 99 mg/dL    Urea Nitrogen 37 (H) 7 - 30 mg/dL    Creatinine 1.48 (H) 0.66 - 1.25 mg/dL    GFR Estimate 44 (L) >60 mL/min/1.7m2    GFR Estimate If Black 54 (L) >60 mL/min/1.7m2    Calcium 9.1 8.5 - 10.1 mg/dL   Lactic acid   Result Value Ref Range    Lactic Acid 2.6 (H) 0.4 - 2.0 mmol/L   UA with Microscopic   Result Value Ref Range    Color Urine Yellow     Appearance Urine Clear     Glucose Urine Negative NEG^Negative mg/dL    Bilirubin Urine Negative NEG^Negative    Ketones Urine Negative NEG^Negative mg/dL    Specific Gravity Urine 1.013 1.003 - 1.035    Blood Urine Negative NEG^Negative    pH Urine 5.5 5.0 - 7.0 pH    Protein Albumin Urine 30 (A) NEG^Negative mg/dL    Urobilinogen mg/dL Normal 0.0 - 2.0 mg/dL    Nitrite Urine Negative NEG^Negative    Leukocyte Esterase Urine Moderate (A) NEG^Negative    Source Midstream Urine     WBC Urine 29 (H) 0 - 5 /HPF    RBC Urine 2 0 - 2 /HPF    Bacteria Urine Few (A) NEG^Negative /HPF    Squamous Epithelial /HPF Urine 3 (H) 0 - 1 /HPF    Mucous Urine Present (A) NEG^Negative /LPF   XR Chest 2 Views    Narrative    CHEST TWO VIEWS 5/23/2018 10:40 PM     HISTORY: Fever, chemotherapy patient.     COMPARISON: 3/25/2018.       Impression    IMPRESSION: Interval resolution of previously seen right pleural  effusion and associated infiltrate/atelectasis. A smaller left pleural  effusion on the prior exam has also resolved. No confluent infiltrates  or definite mass lesions. Heart size is difficult evaluate due to  rotation of the patient, but it is probably unchanged.    SHERYL AVALOS MD     Treatments provided: IV fluid bolus of 1000 cc, IVPB of Invanc 1gram.     Family Comments: Son present    OBS brochure/video discussed/provided to patient: N/A    ED Medications:   Medications   ertapenem (INVanz) 1 g vial to attach to  mL bag (1 g Intravenous New Bag 5/23/18 2311)   acetaminophen (TYLENOL) tablet 975 mg (975 mg Oral Given 5/23/18 2222)   0.9% sodium chloride BOLUS (0 mLs Intravenous Stopped 5/23/18 2305)       Drips infusing?:  No    For the majority of the shift this patient was Green.   Interventions performed were None.    Severe Sepsis OR Septic Shock Diagnosis Present: No      ED NURSE PHONE NUMBER: [phone]

## 2018-05-24 NOTE — PLAN OF CARE
Problem: Patient Care Overview  Goal: Plan of Care/Patient Progress Review  Outcome: No Change  Patient up to floor around 0000. A&Ox4, forgetful. Denied pain. VSS, tmax=99.0. LS clear/diminished. Rashy areas on left and right shin. Left shin > right. Scab looking area on coccyx. Put protective mepilex on site. BS active, mod carb diet. Voiding in urinal at bedside. Left PIV infusing NS at 100 mL/hr; intermittent IV antibiotics. Contact for ESBL in urine. T&R. Hem/Onc, PT and OT consulted. Discharge pending. Continue to monitor.

## 2018-05-24 NOTE — PROGRESS NOTES
Consult dictated.    89-year-old gentleman with multiple myeloma on Revlimid and dexamethasone admitted with weakness and fever.  He has sepsis likely secondary to UTI.  Multiple myeloma, Revlimid and dexamethasone are all predisposing to infectious complication.  Labs revealed pancytopenia.    Plan:  -Hold Revlimid and dexamethasone.  -Continue antibiotics.  -Monitor CBC.  Transfuse for hemoglobin below 7.0 and platelet below 20.  Neupogen will be started if he becomes neutropenic.  -Dr. Quinn will discuss with the patient in clinic regarding stopping treatment for multiple myeloma given his overall decline in condition in last few months.

## 2018-05-24 NOTE — PLAN OF CARE
Problem: Patient Care Overview  Goal: Plan of Care/Patient Progress Review  PT and OT: Received eval orders.  Pt admitted overnight with sepsis, suspected UTI.  Holding therapy evals this date to allow for medical management prior to assessments.

## 2018-05-24 NOTE — PLAN OF CARE
Problem: Patient Care Overview  Goal: Plan of Care/Patient Progress Review  Outcome: No Change  Pt c/o weakness, needs encouragement to get out of bed and ambulate. Up with assist of one, GB, and walker. Weaned to room air. T max 102.2 today, Tylenol given, BC negative so far. Await urine culture, on IV Invanz. Revlimid on hold per onc. Con't to monitor plt count.

## 2018-05-24 NOTE — H&P
Admitted:     05/23/2018      DATE OF ADMISSION: 05/24/2018      PRIMARY CARE PHYSICIAN: Dickson Reich MD      PRIMARY ONCOLOGIST: Gretel Quinn MD      CHIEF COMPLAINT:  Generalized weakness.      HISTORY OF PRESENT ILLNESS:  History of present illness has been obtained partially from the patient, although he seems tired at the time of my examination and he seems to be a relatively poor historian.  I did discuss with ER attending, Dr. Phillips.  I also discussed with the patient's son who was present at the time of my examination and I reviewed his chart extensively.      Mr. Roc Parkinson is a very pleasant 89-year-old gentleman with complex past medical history of hypertension, dyslipidemia, paroxysmal atrial fibrillation (only on aspirin), type 2 diabetes mellitus (insulin-dependent), myelodysplastic syndrome, multiple myeloma followed by Dr. Quinn as outpatient, history of chronic kidney disease stage 3, hypothyroidism, pancytopenia, congestive heart failure with preserved ejection fraction, gastroesophageal reflux disease, history of recurrent UTI with a history of ESBL UTI, and history of prostate and bladder cancer who was brought in to the ER for evaluation of generalized weakness.      As mentioned above, the patient does have multiple myeloma.  He follows with Dr. Quinn of Oncology.  He has been on multiple regimens of chemotherapy in the past.  On last Monday (3 days ago), he was started on Revlimid, which he takes 1 pill daily.  The patient had a recent admission to [facility] from 05/07/2018-05/10/2018 for generalized weakness.  He was found to have ESBL E. coli UTI.  He was followed by Infectious Disease specialist, treated with ertapenem in the hospital and discharged on ertapenem for 10 more days through [facility], which I think he finished on 05/20/2018.  He was seen by his primary care physician on 05/22/2018 because of reported foul-smelling urine.  He had his urine checked.  UA  was slightly abnormal, white blood cells 5-10, trace leukocyte esterase, but urine culture returned back with no growth.      The patient states that he is having generalized weakness for a long time now, but this morning he felt much weaker than his baseline, to the point that he could not stand up, which was new for him.  He also reports that in the past he had falls, but he did not have any fall today or yesterday.  Upon further questioning, he is not sure if he had a fever at home, but he did feel hot alternating with feeling cold.  Upon further questioning, he denies any chest pain, no shortness of breath, no palpitations, no nausea, no vomiting, no diarrhea, no constipation, no headache, no dysuria, no leg swelling.  He states that his appetite is okay.  He denies any coughing.      In the ER, he was seen by Dr. Phillips.  His initial vitals showed that he was having a fever with temperature of 103.1.  His heart rate was 94, respiratory rate 20, oxygen saturation 93%-96% on room air, and blood pressure was 179/77.  He did have basic blood work which showed a BMP with elevated creatinine of 1.48.  Normal sodium, potassium, chloride.  Lactic acid was mildly elevated at 2.6.  Glucose was 274.  CBC with white blood cells of 3, hemoglobin 8.6, hematocrit 26.3 and platelet count of 57.  UA was again mildly abnormal, white blood cells of 29, moderate leukocyte esterase, few bacteria.  Urine culture and blood culture were sent to the lab, pending at this time.  His chest x-ray showed no confluent infiltrates or definite mass lesions.      In ER, the patient was given 1 bolus of normal saline 1 liter, 1 dose of Tylenol 975 mg p.o., and 1 dose of ertapenem given his history of ESBL E. coli UTI, and Hospitalist Service was called regarding the admission.      PAST MEDICAL HISTORY:   1.  Hypertension.   2.  Dyslipidemia.   3.  History of coronary artery disease.   4.  Multiple myeloma, being followed by Dr. Quinn.  He has  been on different regimens of chemotherapy in the past.  Most recently, he was started on Revlimid last Monday.   5.  Chemotherapy-induced pancytopenia.   6.  Paroxysmal atrial fibrillation, not on anticoagulation with warfarin due to history of GI bleeding.  He is on aspirin.   7.  History of coronary artery disease.   8.  Type 2 diabetes mellitus, insulin-dependent.   9.  Chronic kidney disease stage 3.   10.  Hypothyroidism.   11.  History of recurrent UTIs with history of ESBL UTI.  Most recent admission to [facility] from 05/07/2018-05/10/2018 for ESBL UTI.  He was treated with ertapenem as per ID's recommendations.   12.  History of gastroesophageal reflux disease.   13.  History of heart failure with preserved ejection fraction of 55% to 60% on echocardiogram in March 2018.   14.  Vitamin D deficiency.   15.  Irritable bowel syndrome.   16.  Remote history of prostate and bladder cancer.   17.  Osteoarthritis.      PAST SURGICAL HISTORY:   Past Surgical History:   Procedure Laterality Date     ARTHROPLASTY KNEE  11/5/2012    Procedure: ARTHROPLASTY KNEE;  RIGHT TOTAL KNEE ARTHROPLASTY (SMITH & NEPHEW)^;  Surgeon: Dickson Schulte MD;  Location:  OR     BIOPSY      bladder     COLONOSCOPY  2007     COLONOSCOPY N/A 11/15/2016    Procedure: COMBINED COLONOSCOPY, SINGLE OR MULTIPLE BIOPSY/POLYPECTOMY BY BIOPSY;  Surgeon: Kenneth Fulton MD;  Location:  GI     GENITOURINARY SURGERY      TURP x2 and bladder scraping     GI SURGERY      anal fistula     ORTHOPEDIC SURGERY      lt knee in nov.2009     PHACOEMULSIFICATION CLEAR CORNEA WITH STANDARD INTRAOCULAR LENS IMPLANT Left 10/25/2017    Procedure: PHACOEMULSIFICATION CLEAR CORNEA WITH STANDARD INTRAOCULAR LENS IMPLANT;  LEFT EYE PHACOEMULSIFICATION CLEAR CORNEA WITH STANDARD INTRAOCULAR LENS IMPLANT ;  Surgeon: Shady Haider MD;  Location:  EC     PHACOEMULSIFICATION CLEAR CORNEA WITH STANDARD INTRAOCULAR LENS IMPLANT Right  11/1/2017    Procedure: PHACOEMULSIFICATION CLEAR CORNEA WITH STANDARD INTRAOCULAR LENS IMPLANT;  RIGHT EYE PHACOEMULSIFICATION CLEAR CORNEA WITH STANDARD INTRAOCULAR LENS IMPLANT;  Surgeon: Shady Haider MD;  Location: [facility]     SOFT TISSUE SURGERY      melanoma        SOCIAL HISTORY:  The patient denies alcohol drinking.  He denies smoking.  He denies illicit drug abuse.      FAMILY HISTORY:  Reviewed with the patient and is noncontributory to the current admission.      PRIOR TO ADMISSION MEDICATIONS:  Needs to be verified by the pharmacy.  Apparently he is on    Current Outpatient Prescriptions on File Prior to Encounter:  ACYCLOVIR PO Take 400 mg by mouth 2 times daily Oncology to decide when stop date will be   amLODIPine (NORVASC) 10 MG tablet Take 1 tablet (10 mg) by mouth daily   Ascorbic Acid (VITAMIN C PO) Take 500 mg by mouth daily    dexamethasone (DECADRON) 4 MG tablet Take 10 tablets (40 mg) by mouth once a week Once a week with food on Days 1,8,15 and 22.   ferrous sulfate (IRON) 325 (65 FE) MG tablet Take 325 mg by mouth daily (with breakfast)   furosemide (LASIX) 20 MG tablet TAKE 1 TABLET BY MOUTH EVERY DAY   gemfibrozil (LOPID) 600 MG tablet TAKE 1 TABLET BY MOUTH TWICE DAILY   insulin aspart (NOVOLOG) 100 UNITS/ML injection Inject 5 Units Subcutaneous daily (with dinner) (Patient taking differently: Inject 9 Units Subcutaneous daily (with dinner) )   INSULIN ASPART SC Inject Subcutaneous 4 times daily Sliding scale:140-175 1 [id number] 2 [id number] 3 [id number] 4 [id number] 5 [id number] 6 [id number] 7 [id number] 8 units>420 9 units   INSULIN ASPART SC Inject 13 Units Subcutaneous every morning    INSULIN ASPART SC Inject 11 Units Subcutaneous daily (before lunch)    INSULIN ASPART SC Inject 5 Units Subcutaneous At Bedtime Inject 5 unit subcutaneously at bedtime for Diabetes II Ok to use Humalog insulin with same order details   insulin detemir (LEVEMIR FLEXPEN/FLEXTOUCH) 100  UNIT/ML injection Inject 7 Units Subcutaneous every morning (before breakfast) (Patient taking differently: Inject 10 Units Subcutaneous every morning (before breakfast) )   LENalidomide (REVLIMID) 5 MG CAPS capsule CHEMO Take 1 capsule (5 mg) by mouth daily for 21 days Take on Days 1 through 21 of 28-day cycle.   Levothyroxine Sodium (SYNTHROID PO) Take 50 mcg by mouth twice a week Monday and Friday   LEVOTHYROXINE SODIUM PO Take 25 mcg by mouth five times a week Tuesday, Wednesday, Thursday, Saturday, Sunday   LISINOPRIL PO Take 15 mg by mouth daily   polyethylene glycol (MIRALAX/GLYCOLAX) Packet Take 17 g by mouth 2 times daily as needed (constipation)   Selenium 200 MCG TABS Take 100 mcg by mouth daily. Pt takes one half tab of the 200 mcg daily    VITAMIN D, CHOLECALCIFEROL, PO Take 1,000 Units by mouth daily    B-D U/F 31G X 8 MM insulin pen needle USE 5 PEN NEEDLES PER DAY OR AS DIRECTED   Blood Glucose Monitoring Suppl MISC 3 times daily (before meals)    FREESTYLE LITE test strip USE TO TEST FOUR TIMES DAILY   prochlorperazine (COMPAZINE) 5 MG tablet Take 1 tablet (5 mg) by mouth every 6 hours as needed (Nausea/Vomiting)   [DISCONTINUED] cyclophosphamide (CYTOXAN) 50 MG capsule CHEMO Take 6 capsules (300 mg) by mouth once a week   [DISCONTINUED] metoprolol succinate (TOPROL-XL) 50 MG 24 hr tablet Take 1 tablet (50 mg) by mouth daily        ALLERGIES:  NO KNOWN DRUG ALLERGIES.      REVIEW OF SYSTEMS:  A 10-point review of systems was conducted and it was negative except for pertinent positives mentioned in history of present illness.      PHYSICAL EXAMINATION:   VITAL SIGNS:  Blood pressure is 171/76, heart rate 82, respiratory rate 16, oxygen saturation 97%-98% on room air, T-max in .1.   GENERAL:  The patient is awake, alert, no acute distress at the time of my examination.  He looks tired and he is hard of hearing.   HEENT:  Normocephalic, atraumatic.  Pupils are equally round and reactive to light.   Oral mucosa is mildly dry.   NECK:  Supple.  No cervical lymphadenopathy, no thyromegaly.   CHEST:  There is bilateral air entry with very mild crackles at the bases, no wheezing, no rales.   CARDIOVASCULAR:  There is normal S1, S2, regular rate and rhythm.  No murmurs, no rubs.   ABDOMEN:  Soft, nontender, nondistended.  Bowel sounds are present.   EXTREMITIES:  There is no leg swelling, no calf tenderness, 2+ peripheral pulses are palpable.   SKIN:  Intact, no rash, no cyanosis.   NEUROLOGIC:  The patient is awake, alert and oriented x 3.  No focal neurological deficits.  Generalized weakness noted.   PSYCHIATRIC:  Normal mood, normal affect.   MUSCULOSKELETAL:  He moves all extremities freely.  There are no obvious joint deformities.      LABORATORY DATA:  BMP with sodium 138, potassium 4.3, chloride 106, bicarbonate 22, BUN 37, creatinine 1.38, calcium is 9.1, anion gap of 10.  Lactic acid 2.6.  Glucose 274.  White blood cells 3, hemoglobin 8.6, hematocrit 26.3, platelet count 57.  UA mildly abnormal with white blood cells of 29, moderate leukocyte esterase, few bacteria. Urine cultures and blood cultures are pending at this time.      IMAGING:  Chest x-ray does not show any acute infiltrates or masses.      ASSESSMENT:  Mr. Roc Parkinson is a very pleasant 89-year-old gentleman with complex past medical history of multiple myeloma, hypertension, dyslipidemia, coronary artery disease, congestive heart failure with preserved ejection fraction, diabetes mellitus type 2, paroxysmal atrial fibrillation on aspirin, hypothyroidism, vitamin D deficiency, chemotherapy-induced pancytopenia, history of recurrent UTI with a history of ESBL E. coli UTI, gastroesophageal reflux disease, and remote history of prostate and bladder cancer who was brought in for evaluation of generalized weakness.  He was found to be febrile, temperature 103.1 in ER.      PLAN:   1.  Sepsis, possibly due to urinary tract infection.  He  presented with generalized weakness, fever of 103.1 in the ER, lactic acid elevated at 2.6.  He does have a history of recurrent UTIs and the last admission to [facility] was for ESBL E. coli UTI, for which he was treated with IV ertapenem in the hospital and discharged on 10 more days of ertapenem through midline, which I think he finished on 05/20/2018.  He had another UA checked a couple of days ago in PMD office, which was abnormal, although urine culture was negative.  At this time, his UA is abnormal again with white blood cells 29, moderate leukocyte esterase.  I cannot find any other source of infection.  His chest x-ray is negative.  He does have chronic pancytopenia which seems to be multifactorial from his multiple myeloma/myelodysplastic syndrome and related to chemotherapy as well.  He was actually recently started on Revlimid on last Monday.  Given his history of bladder and prostate cancer, he was seen by Urology during his last hospitalization.  Apparently he declined cystoscopy at that time. Urine cytology was negative for malignancy.  The plan was to follow up with Urology as outpatient in 4-6 weeks.  He received 1 dose of ertapenem in emergency room.  Will continue with ertapenem for now, follow up urine cultures and blood cultures.  Will monitor fever curve and white blood cell trend.  Will deescalate antibiotic therapy depending on cultures.  If urine culture positive again for ESBL, likely will need to be seen by ID consultant as well.   2.  Generalized weakness, likely multifactorial due to current sepsis in the setting of multiple myeloma.  The patient reported falls at home, although not today or yesterday.  The patient will be seen by PT and OT.  Fall precautions in place.   3.  Multiple myeloma/myelodysplastic syndrome.  He is being followed by Dr. Quinn of Hematology/Oncology.  He has been on multiple chemotherapy regimens.  As per the last notes, he was recently started  on Revlimid daily, planned for 21-day course, then 7 days off, and he also takes dexamethasone weekly.  We will hold Revlimid at this time and Hem/Onc consult has been requested.   4.  Pancytopenia, which seems to be multifactorial due to his multiple myeloma/myelodysplastic syndrome and also chemotherapy-induced pancytopenia.  White blood cells 3, hemoglobin 8.6, platelet count 57.  The numbers are close to his prior numbers.  He does not have any evidence of active bleeding, no need for blood transfusion products at this time.  As mentioned above, we will ask for Hematology/Oncology consult.  Will closely monitor his CBC.   5.  Hypertension.  His blood pressure seemed to be on higher side upon arrival in ER.  Later on, blood pressure was better controlled.  The plan for now is to continue his prior to admission Norvasc 10 mg p.o. daily and metoprolol succinate 50 mg p.o. daily.  I am holding his prior to admission lisinopril for now.  Hydralazine IV p.r.n. has been ordered for elevated blood pressures.   6.  History of dyslipidemia.  We will continue his prior to admission Lopid.   7.  History of diastolic CHF with preserved ejection fraction of 55%-60%.  He seems euvolemic at this time.  At home, he has maintained on Lasix 20 mg p.o. daily.  Given his sepsis and fever, I am holding his Lasix for now.  I am going to mildly hydrate him.  Will closely monitor his fluid status and if there is any evidence of fluid overload, we should stop IV fluids and resume his Lasix.   8.  Type 2 diabetes mellitus, insulin-dependent.  His most recent hemoglobin A1c was 5.5 in March 2018.  Prior to admission, he had been maintained on insulin Levemir 17 units subcutaneously every morning, insulin aspart 13 units every morning, 11 units with lunch and 10 units with dinner.  The plan for now is to continue his long-acting Levemir and will order insulin aspart 10 units with breakfast and lunch, and 5 units with supper.  Insulin  sliding scale has been ordered as well.   9.  Hypothyroidism.  His most recent TSH on 2018 was high at 10.08, although free T4 was normal.  Apparently his primary care physician adjusted his levothyroxine doses at that time.  We will plan to continue his prior to admission levothyroxine 50 mcg twice a week on Monday and Friday and 25 mcg p.o. on the rest of the days of the week, and we will repeat thyroid function test.   10.  Acute kidney injury on chronic kidney disease.  His baseline creatinine seems to be between 0.9-1.2.  His creatinine today is slightly higher than his baseline at 1.48, so plan is to hold his prior to admission Lasix. I am holding his prior to admission lisinopril.  I am going to mildly hydrate him with normal saline at 100 mL per hour and will repeat BMP in the morning.  Will avoid nephrotoxic drugs.   11.  Paroxysmal atrial fibrillation, not on chronic anticoagulation.  Apparently his anticoagulation was discontinued 1 year ago due to GI bleeding.  He has been maintained on aspirin 325 mg p.o. daily.  His heart rate seems to be well controlled on his Toprol-XL 50 mg p.o. daily, which will be continued during this hospitalization.   12.  History of vitamin D deficiency.  Continue prior to admission vitamin D.   13.  DVT prophylaxis:  Pneumatic compression device.  No heparin or Lovenox given his thrombocytopenia.      CODE STATUS:  DISCUSSED WITH THE PATIENT AND PATIENT IS DNR/DNI.      DISPOSITION:  Admit inpatient.  Anticipate at least 2 days of hospitalization.         RADHA ARANA MD             D: 2018   T: 2018   MT:       Name:     JACOB MEDELLIN   MRN:      [id number]        Account:      [id number]   :      [id number]        Admitted:     2018                   Document: [id number]

## 2018-05-24 NOTE — PROGRESS NOTES
[facility]    Hospitalist Progress Note      Assessment & Plan   Roc Parkinson is a 89 year old male with complex past medical history of multiple myeloma, hypertension, dyslipidemia, coronary artery disease, congestive heart failure with preserved ejection fraction, diabetes mellitus type 2, paroxysmal atrial fibrillation on aspirin, hypothyroidism, vitamin D deficiency, chemotherapy-induced pancytopenia, history of recurrent UTI with a history of ESBL E. coli UTI, gastroesophageal reflux disease, and remote history of prostate and bladder cancer who was brought in for evaluation of generalized weakness.  He was found to be febrile, temperature 103.1 in ER and abnormal UA. He is being admitted for suspected urinary tract infection.        Sepsis, possibly due to urinary tract infection.   Presented with generalized weakness and fever of 103.1.  Initial  Lactic acid of 2.6. Hx of recurrent UTI, recent ESBL Ecoli and has completed a course of IV Ertapenem on 5/20/18.  UA abnormal with pyuria.  CXR is negative for acute infiltrate.  He has pancytopenia but is not neutropenic.  No other source of infection at this time identified other than abnormal UA, thus treating as a UTI.  -- continue with Ertapenem for now  -- follow urine culture results, if +for ESBL, possible ID consult  -- follow blood culture results, no growth to date  -- given his history of bladder and prostate cancer, he was seen by Urology during his last hospitalization.  Apparently he declined cystoscopy at that time. Urine cytology was negative for malignancy.  The plan was to follow up with Urology as outpatient in 4-6 weeks.    Generalized weakness, likely multifactorial due to current sepsis in the setting of multiple myeloma.    --  PT and OT.    -- Fall precautions in place.   -- treatment of possible UTI as above    Multiple myeloma/myelodysplastic syndrome.   He is being followed by Dr. Quinn of Hematology/Oncology.  He has  been on multiple chemotherapy regimens.  As per the last notes, he was recently started on Revlimid daily, planned for 21-day course, then 7 days off, and he also takes dexamethasone weekly.    -- hold Revlimid at this time and Hem/Onc consult has been requested.     Pancytopenia,multifactorial due to his multiple myeloma/myelodysplastic syndrome and also chemotherapy-induced pancytopenia.  --   WBC 3,ANC 1.9 hemoglobin 8.6, platelet count 57.  The numbers are close to his prior numbers.   -- no signs of active bleeding at this time, will monitor  -- hem/onc consult placed    Acute kidney injury on chronic kidney disease.    His baseline creatinine seems to be between 0.9-1.2.  His creatinine 1.48 on admission.   -- improved to 1.24 with hydration.   -- monitor renal function, avoid nephrotoxin agents    Hypertension.    BP currently better controlled  -- continue with PTA Norvasc 10mg daily and Metop succinate 50mg daily  -- hold lisinopril today, if bp and cr remain stable, will add back in the next day  -- hydralazine prn for elevated bp    History of dyslipidemia.    -- will continue his prior to admission Lopid.     History of diastolic CHF with preserved ejection fraction of 55%-60%.    He seems euvolemic at this time.    -- given sepsis, hold Lasix 20 mg p.o. daily.    -- monitor volume status while on IVF    Type 2 diabetes mellitus, insulin-dependent.    His most recent hemoglobin A1c was 5.5 in March 2018. PTA on insulin Levemir 17 units subcutaneously every morning, insulin aspart 13 units every morning, 11 units with lunch and 10 units with dinner.    -- since unclear how much he will be eating, will cut his mealtime insulin Aspart down to 4 units TID w/meals and SSI  -- adjust insulin based on BG levels     Hypothyroidism.  His most recent TSH on 04/25/2018 was high at 10.08, although free T4 was normal and seems like levohtyroxine dose has been adjusted then by PCP.    -- continue current dose of  Levothyroxine    Paroxysmal atrial fibrillation, not on chronic anticoagulation.    His anticoagulation was discontinued 1 year ago due to GI bleeding.  He has been maintained on aspirin 325 mg p.o. daily.    -- continue with PTA Toprol-XL 50 mg p.o. Daily    FEN: continue with IVF today, decrease rate to 75cc/hr,       # Pain Assessment:  Current Pain Score 5/24/2018   Patient currently in pain? denies   Pain score (0-10) 0   Pain location -   Pain descriptors -   Roc persaud pain level was assessed and he currently denies pain.      DVT Prophylaxis: Pneumatic Compression Devices  Code Status: DNR/DNI    Disposition: Expected discharge in 2-3 days pending culture results and resolution of fever    Cindy Kitchen    Interval History   Temp of 102 this am. He feels very weak.   Denies dysuria, flank pain, cough or shortness of breath.     -Data reviewed today: I reviewed all new labs and imaging results over the last 24 hours. I personally reviewed the chest x-ray image(s) showing no acute infiltrate.    Physical Exam   Temp: 99  F (37.2  C) Temp src: Oral BP: 133/61   Heart Rate: 82 Resp: 16 SpO2: 94 % O2 Device: Nasal cannula Oxygen Delivery: 2 LPM  Vitals:    05/23/18 2140 05/24/18 0535   Weight: 90.7 kg (200 lb) 91.3 kg (201 lb 3.2 oz)     Vital Signs with Ranges  Temp:  [99  F (37.2  C)-103.1  F (39.5  C)] 99  F (37.2  C)  Heart Rate:  [82-94] 82  Resp:  [16-20] 16  BP: (113-179)/(39-92) 133/61  SpO2:  [91 %-98 %] 94 %  I/O last 3 completed shifts:  In: -   Out: 375 [Urine:375]    Constitutional: Alert, awake and appears weak  Respiratory: Clear to ausculation bilaterally, no wheezing  Cardiovascular: regular rate , no murmur  GI: soft, non-tender and non-distended  Skin/Integumen: warm and dry  Other: No flank tenderness    Medications     sodium chloride 100 mL/hr at 05/24/18 0237       acyclovir (ZOVIRAX) capsule 400 mg  400 mg Oral BID     amLODIPine  10 mg Oral Daily     aspirin  325 mg Oral Daily      cholecalciferol  1,000 Units Oral Daily     ertapenem (INVanz) IV  1 g Intravenous Q24H     ferrous sulfate  325 mg Oral Daily with breakfast     gemfibrozil  600 mg Oral BID     insulin aspart  10 Units Subcutaneous QAM     insulin aspart  10 Units Subcutaneous Daily before lunch     insulin aspart  5 Units Subcutaneous Daily with supper     insulin aspart  1-7 Units Subcutaneous TID AC     insulin aspart  1-5 Units Subcutaneous At Bedtime     insulin detemir  7 Units Subcutaneous QAM AC     levothyroxine (SYNTHROID/LEVOTHROID) tablet 25 mcg  25 mcg Oral Once per day on Sun Tue Wed Thu Sat     [START ON 5/25/2018] levothyroxine (SYNTHROID/LEVOTHROID) tablet 50 mcg  50 mcg Oral Once per day on Mon Fri     metoprolol succinate  50 mg Oral Daily     sodium chloride (PF)  3 mL Intracatheter Q8H       Data     Recent Labs  Lab 05/24/18  0701 05/23/18  2140 05/17/18  1222   WBC  --  3.0*  --    HGB  --  8.6*  --    MCV  --  110*  --    PLT  --  57*  --     138  --    POTASSIUM 3.7 4.3  --    CHLORIDE 109 106  --    CO2 21 22  --    BUN 31* 37*  --    CR 1.24 1.48* 1.18   ANIONGAP 9 10  --    MICHELLE 8.5 9.1  --    * 274*  --    ALBUMIN 2.0*  --   --    PROTTOTAL 10.0*  --   --    BILITOTAL 0.2  --   --    ALKPHOS 62  --   --    ALT 19  --   --    AST 21  --  18       Recent Results (from the past 24 hour(s))   XR Chest 2 Views    Narrative    CHEST TWO VIEWS 5/23/2018 10:40 PM     HISTORY: Fever, chemotherapy patient.     COMPARISON: 3/25/2018.      Impression    IMPRESSION: Interval resolution of previously seen right pleural  effusion and associated infiltrate/atelectasis. A smaller left pleural  effusion on the prior exam has also resolved. No confluent infiltrates  or definite mass lesions. Heart size is difficult evaluate due to  rotation of the patient, but it is probably unchanged.    SHERYL AVALOS MD

## 2018-05-24 NOTE — ED NOTES
Bed: ED12  Expected date:   Expected time:   Means of arrival:   Comments:  [age]M weakness/CA pt

## 2018-05-24 NOTE — PHARMACY-ADMISSION MEDICATION HISTORY
Admission medication history interview status for the 5/23/2018  admission is complete. See EPIC admission navigator for prior to admission medications     Medication history source reliability:Good    Actions taken by pharmacist (provider contacted, etc):None     Additional medication history information not noted on PTA med list :None    Medication reconciliation/reorder completed by provider prior to medication history? Yes    Time spent in this activity: 20 minutes    Prior to Admission medications    Medication Sig Last Dose Taking? Auth Provider   ACYCLOVIR PO Take 400 mg by mouth 2 times daily Oncology to decide when stop date will be 5/23/2018 at Unknown time Yes Reported, Patient   amLODIPine (NORVASC) 10 MG tablet Take 1 tablet (10 mg) by mouth daily 5/23/2018 at Unknown time Yes Cindy Kitchen MD   Ascorbic Acid (VITAMIN C PO) Take 500 mg by mouth daily  5/23/2018 at Unknown time Yes Unknown, Entered By History   dexamethasone (DECADRON) 4 MG tablet Take 10 tablets (40 mg) by mouth once a week Once a week with food on Days 1,8,15 and 22.  Yes Gretel Quinn MD   ferrous sulfate (IRON) 325 (65 FE) MG tablet Take 325 mg by mouth daily (with breakfast) 5/23/2018 at Unknown time Yes Unknown, Entered By History   furosemide (LASIX) 20 MG tablet TAKE 1 TABLET BY MOUTH EVERY DAY 5/23/2018 at Unknown time Yes Dickson Reich MD   gemfibrozil (LOPID) 600 MG tablet TAKE 1 TABLET BY MOUTH TWICE DAILY 5/23/2018 at Unknown time Yes Dickson Reich MD   insulin aspart (NOVOLOG) 100 UNITS/ML injection Inject 5 Units Subcutaneous daily (with dinner)  Patient taking differently: Inject 9 Units Subcutaneous daily (with dinner)  5/23/2018 at Unknown time Yes Jayson Howell MD   INSULIN ASPART SC Inject Subcutaneous 4 times daily Sliding scale:  140-175 1 unit  176-210 2 units  211-245 3 units  246-280 4 units  281-315 5 units  316-350 6 units  351-385 7 units  386-420 8 units  >420 9 units  5/23/2018 at Unknown time Yes Unknown, Entered By History   INSULIN ASPART SC Inject 13 Units Subcutaneous every morning  5/23/2018 at Unknown time Yes Reported, Patient   INSULIN ASPART SC Inject 11 Units Subcutaneous daily (before lunch)  5/23/2018 at Unknown time Yes Reported, Patient   INSULIN ASPART SC Inject 5 Units Subcutaneous At Bedtime Inject 5 unit subcutaneously at bedtime for Diabetes II Ok to use Humalog insulin with same order details 5/23/2018 at Unknown time Yes Reported, Patient   insulin detemir (LEVEMIR FLEXPEN/FLEXTOUCH) 100 UNIT/ML injection Inject 7 Units Subcutaneous every morning (before breakfast)  Patient taking differently: Inject 10 Units Subcutaneous every morning (before breakfast)  5/23/2018 at Unknown time Yes Jayson Howell MD   LENalidomide (REVLIMID) 5 MG CAPS capsule CHEMO Take 1 capsule (5 mg) by mouth daily for 21 days Take on Days 1 through 21 of 28-day cycle.  Yes Gretel Quinn MD   Levothyroxine Sodium (SYNTHROID PO) Take 50 mcg by mouth twice a week Monday and Friday Past Week at Unknown time Yes Unknown, Entered By History   LEVOTHYROXINE SODIUM PO Take 25 mcg by mouth five times a week Tuesday, Wednesday, Thursday, Saturday, Sunday 5/23/2018 at Unknown time Yes Unknown, Entered By History   LISINOPRIL PO Take 15 mg by mouth daily 5/23/2018 at Unknown time Yes Reported, Patient   metoprolol succinate (TOPROL-XL) 50 MG 24 hr tablet Take 1 tablet (50 mg) by mouth daily 5/23/2018 at Unknown time Yes Cindy Kitchen MD   polyethylene glycol (MIRALAX/GLYCOLAX) Packet Take 17 g by mouth 2 times daily as needed (constipation) Past Month at Unknown time Yes Kellee Melo MD   Selenium 200 MCG TABS Take 100 mcg by mouth daily. Pt takes one half tab of the 200 mcg daily  5/23/2018 at Unknown time Yes Reported, Patient   VITAMIN D, CHOLECALCIFEROL, PO Take 1,000 Units by mouth daily  5/23/2018 at Unknown time Yes Reported, Patient   B-D U/F 31G X 8 MM insulin pen  needle USE 5 PEN NEEDLES PER DAY OR AS DIRECTED   Dickson Reich MD   Blood Glucose Monitoring Suppl MISC 3 times daily (before meals)    Reported, Patient   FREESTYLE LITE test strip USE TO TEST FOUR TIMES DAILY   Dickson Reich MD   prochlorperazine (COMPAZINE) 5 MG tablet Take 1 tablet (5 mg) by mouth every 6 hours as needed (Nausea/Vomiting) More than a month at Unknown time  Gretel Quinn MD

## 2018-05-24 NOTE — PROVIDER NOTIFICATION
MD Notification    Notified Person: MD    Notified Person Name:  Alvincenzo    Notification Date/Time: 5/24/18 8600    Notification Interaction: Telephone     Purpose of Notification: Pt had been very constipated, pt had a large formed brown BM with a moderate amount of bright red liquid blood around the stool.     Orders Received: Hemoglobin check    Comments: Blood was most likely from straining and possible hemorrhoid.  Will continue to closely monitor pt

## 2018-05-25 NOTE — PROGRESS NOTES
"Hematology-Oncology Follow-up Note  [facility]    Today's Date: 05/25/18  Date of Admission:  5/23/2018  Reason for Consult: Multiple myeloma      ASSESSMENT/ PLAN :  Roc Parkinson is a 89 year old male     Multiple myeloma-IgM kappa multiple myeloma diagnosed in 11/2016.  Patient is status post multiple therapies.  Currently on Revlimid 5 mg a day and dexamethasone 40 mg weekly  Patient is admitted for fever, progressive weakness    Patient has been progressively getting worse.  We are holding his multiple myeloma treatment during this hospitalization.  He will  see Dr. Quinn after discharge in  regards to stopping the treatment for multiple myeloma.  His counts are stable today hemoglobin 8.4, platelets 46 and ANC 2.3,   check CBC platelets and differential daily  Transfuse for hemoglobin below 7 and platelets below 20.  Patient is seeing  palliative care.      Sepsis  Patient has recurrent ESBL UTI  He is abdomen is distended he is going to have abdomen chest and pelvis  Continue antibiotics  Continue per inpatient infectious disease recommendations        Elevated creatinine  Continue per inpatient team recommendations      Case discussed with Dr. Heath          INTERIM HISTORY:  Patient is laying down comfortably in bed.  He continues to be weak.  He is seen physical therapy.   He denies fever this morning denies chills or sweats, he denies cough or urinary symptoms.  He feels his stomach is a little bit distended.  Denies nausea vomiting diarrhea eyes abdominal pain  MEDICATIONS:  Reviewed         PHYSICAL EXAM:  Vital signs:  Temp: 98.2  F (36.8  C) Temp src: Oral BP: 108/45   Heart Rate: 60 Resp: 18 SpO2: 95 % O2 Device: None (Room air) Oxygen Delivery: 1 LPM Height: 177.8 cm (5' 10\") Weight: 92.2 kg (203 lb 4.8 oz)  Estimated body mass index is 29.17 kg/(m^2) as calculated from the following:    Height as of this encounter: 1.778 m (5' 10\").    Weight as of this encounter: 92.2 kg " (203 lb 4.8 oz).      GENERAL/CONSTITUTIONAL: No acute distress.  RESPIRATORY: Clear to auscultation bilaterally. No crackles or wheezing.   CARDIOVASCULAR: Regular rate and rhythm without murmurs, gallops, or rubs.  GASTROINTESTINAL: No hepatosplenomegaly, masses, or tenderness. The patient has normal bowel sounds. No guarding.  No distention.  MUSCULOSKELETAL: Warm and well-perfused, no cyanosis, clubbing, or edema.  LYMPH: No anterior cervical, posterior cervical, supraclavicular, axillary or inguinal adenopathy.       LABS:  Recent Labs   Lab Test  05/25/18   0705   NA  138   POTASSIUM  3.9   CHLORIDE  110*   CO2  19*   ANIONGAP  9   BUN  28   CR  1.38*   GLC  103*   MICHELLE  8.2*     Recent Labs   Lab Test  05/25/18   0755  05/25/18   0705   05/23/18   2140   WBC  2.7*  Canceled, Test credited   < >  3.0*   HGB  8.4*  Canceled, Test credited   < >  8.6*   PLT  46*  Canceled, Test credited   < >  57*   MCV  109*  Canceled, Test credited   < >  110*   NEUTROPHIL  74.4   --    --   63.7    < > = values in this interval not displayed.     Recent Labs   Lab Test  05/24/18   0701  05/17/18   1222  05/16/18   1538   11/17/16   0938   BILITOTAL  0.2   --   0.2   < >   --    ALKPHOS  62   --   76   < >   --    ALT  19   --   15   < >   --    AST  21  18  15   < >   --    ALBUMIN  2.0*   --   2.2*   < >   --    LDH   --    --    --    --   110    < > = values in this interval not displayed.         Joey Campuzano, Nurse Practitioner   Hematology/Oncology  [facility]

## 2018-05-25 NOTE — PLAN OF CARE
Problem: Patient Care Overview  Goal: Plan of Care/Patient Progress Review  Outcome: No Change  T max 103.1, lactic acid pending, BC pending, IV invanz, elevated BP, PRN hydralazine available, mod cho diet, poor appetite, c/o constipation, given senna and miralax on day shift, had large formed brown BM with some bright red blood, MD notified, Hgb came back unchanged at 8.1, blood most likely due to tear from straining or hemorrhoids, no c/o pain, A&O but forgetful, up with assist of 1 NETTIE and walker

## 2018-05-25 NOTE — PLAN OF CARE
Problem: Patient Care Overview  Goal: Plan of Care/Patient Progress Review  Outcome: Declining  Pt very weak today. Up with strong assist of 1, GB, and walker. Several loose stools today (about 8), sample sent to rule out c diff, enteric isolation started. Febrile this am, 101.2, BC drawn again, ID consulted. Chest/abd CT done, results pending. Pt and son Alfredo met with palliative today to discuss goals of care.

## 2018-05-25 NOTE — PROVIDER NOTIFICATION
MD Notification    Notified Person: MD    Notified Person Name: Dr. Kitchen    Notification Date/Time: 5/25/18 at 0740    Notification Interaction: telephone    Purpose of Notification: ongoing fevers, do you want another set of blood cultures?    Orders Received: Orders for BC x 2    Comments:

## 2018-05-25 NOTE — PLAN OF CARE
Problem: Patient Care Overview  Goal: Plan of Care/Patient Progress Review  OT: Orders received, eval completed, treatment initiated. Pt is a 89 yo male who lives alone in an ILF, has meal services, no other assist, family/friends check in occasionally. Until recently (last 2-4 weeks) pt had been driving, doing laundry, and managing most IADLs, family notes pt has been struggling more lately with maintaining his living space, and has possibly stopped showering. Pt has 3 sons who live out of state, they are looking into increased services and/or a more supportive living environment.     Discharge Planner OT   Patient plan for discharge: None stated  Current status: Pt completed bed mobility with MOD A of 2, able to sit EOB with CGA, completed 3 sit to stands with MOD A of 2 and 2WW, pt unable to take forward steps due to knees buckling, pt able to compelted 3 side steps following extended rest break. Pt required encouragement and rest between each sit to stand, and VC and ed regarding strategies for greater safety and IND. Pt highly fatigued.   Barriers to return to prior living situation: Level of assist, cognition/alertness, pt lives alone without services  Recommendations for discharge: TCU  Rationale for recommendations: Pt would benefit from skilled services to maximize safety and IND in ADLs and IADLs to return to [facility].        Entered by: Shraddha Bustamante 05/25/2018 11:00 AM

## 2018-05-25 NOTE — PLAN OF CARE
Problem: Patient Care Overview  Goal: Plan of Care/Patient Progress Review  PT: Order received, chart reviewed and discussed with IDT. Pt just finished working with OT and demonstrates poor activity tolerance, needing mod A x 2 for mobility, knee buckling and significant fatigue with functional transfers. Will hold PT assessment at this time to allow appropriate rest and recovery for full participation in mobility assist and functional activity progression.

## 2018-05-25 NOTE — PHARMACY-VANCOMYCIN DOSING SERVICE
Pharmacy Vancomycin Initial Note  Date of Service May 25, 2018  Patient's  [id number]  89 year old, male    Indication: Sepsis    Current estimated CrCl = Estimated Creatinine Clearance: 39.8 mL/min (based on Cr of 1.38).    Creatinine for last 3 days  2018:  9:40 PM Creatinine 1.48 mg/dL  2018:  7:01 AM Creatinine 1.24 mg/dL  2018:  7:05 AM Creatinine 1.38 mg/dL    Recent Vancomycin Level(s) for last 3 days  No results found for requested labs within last 72 hours.      Vancomycin IV Administrations (past 72 hours)                   vancomycin (VANCOCIN) 2,500 mg in sodium chloride 0.9 % 500 mL intermittent infusion (mg) 2,500 mg New Bag 18 0912                Nephrotoxins and other renal medications (Future)    Start     Dose/Rate Route Frequency Ordered Stop    18 0900  vancomycin (VANCOCIN) 2,000 mg in sodium chloride 0.9 % 500 mL intermittent infusion      2,000 mg  over 2 Hours Intravenous EVERY 24 HOURS 18 0759      18 0800  vancomycin (VANCOCIN) 2,500 mg in sodium chloride 0.9 % 500 mL intermittent infusion      2,500 mg  over 2 Hours Intravenous ONCE 18 0757      18 0023  acyclovir (ZOVIRAX) capsule 400 mg      400 mg Oral 2 TIMES DAILY 18 0023            Contrast Orders - past 72 hours     None                Plan:  1.  Start vancomycin  2500 mg x 1 dose then 2000 mg IV q24h.   2.  Goal Trough Level: 15-20 mg/L   3.  Pharmacy will check trough levels as appropriate prior to 4th dose.    4. Serum creatinine levels will be ordered daily for the first week of therapy and at least twice weekly for subsequent weeks.    5. [place] method utilized to dose vancomycin therapy: Method 1    Martha Dhillon

## 2018-05-25 NOTE — PROGRESS NOTES
[facility]    Hospitalist Progress Note      Assessment & Plan   Roc Parkinson is a 89 year old male with complex past medical history of multiple myeloma, hypertension, dyslipidemia, coronary artery disease, congestive heart failure with preserved ejection fraction, diabetes mellitus type 2, paroxysmal atrial fibrillation on aspirin, hypothyroidism, vitamin D deficiency, chemotherapy-induced pancytopenia, history of recurrent UTI with a history of ESBL E. coli UTI, gastroesophageal reflux disease, and remote history of prostate and bladder cancer who was brought in for evaluation of generalized weakness.  He was found to be febrile, temperature 103.1 in ER and abnormal UA. He is being admitted for suspected urinary tract infection.        Fever with concern for sepsis   Presented with generalized weakness and fever of 103.1.  Initial  Lactic acid of 2.6. Hx of recurrent UTI, recent ESBL Ecoli and has completed a course of IV Ertapenem on 5/20/18.  CXR is negative for acute infiltrate.  He has pancytopenia but is not neutropenic.  UA abnormal with pyuria on admission thus started empirically on Ertapenem for presumed recurrent UTI. Urine culture with no growth final result.  Has been on Invanz over the last 24 hrs, but with continued fever.  Source of fever is not clear at this time. Patient without symptoms of sob, cough, abd pain, diarrhea, headache or focal symptoms, other than generalized weakness. If infectious work up negative, could possibly be due to ? Underlying malignancy  -- continue with Ertapenem, add vancomycin given persistent fever  -- check procal  -- repeat Blood culture, admission blood culture remains no growth to date  -- will obtain CT Chest/abd/pelvis w/o contrast due to renal function to eval for potential sources given high fever  -- ID consult for further help  -- continue with IVF  -- Tylenol for fever,will give him Motrin x 1 now to break his fever  Addednum 1pm: pt  having mulitiple loose stools since this  Morning. Was constipated and had received laxatives yesterday. Given hx recent antibiotics use, persistent fever without clear source yet, will check c.dif    Multiple myeloma/myelodysplastic syndrome.   He is being followed by Dr. Quinn of Hematology/Oncology.  He has been on multiple chemotherapy regimens.  As per the last notes, he was recently started on Revlimid daily, planned for 21-day course, then 7 days off, and he also takes dexamethasone weekly.    -- hold Revlimid/dexamethasone  -- Hem/Onc following, appreciate help    Pancytopenia,multifactorial due to his multiple myeloma/myelodysplastic syndrome and also chemotherapy-induced pancytopenia.  --  WBC 3,ANC 1.9 hemoglobin 8.6, platelet count 57 yesterday,   -- CBC pending this am, add diff  -- no signs of active bleeding at this time, will monitor  -- hem/onc following    Acute kidney injury on chronic kidney disease.    His baseline creatinine seems to be between 0.9-1.2.  His creatinine 1.48 on admission.   -- improved to 1.24 with hydration, then increased to 1.38 today  -- monitor renal function    Hypertension.    -- continue with PTA Norvasc 10mg daily and Metop succinate 50mg daily  -- hold lisinopril due to fluctuating renal function  -- hydralazine prn for elevated bp    History of dyslipidemia.    -- will continue his prior to admission Lopid.     History of diastolic CHF with preserved ejection fraction of 55%-60%.    He seems euvolemic at this time.    -- given sepsis, hold Lasix 20 mg p.o. daily.    -- monitor volume status while on IVF    Type 2 diabetes mellitus, insulin-dependent.    His most recent hemoglobin A1c was 5.5 in March 2018. PTA on insulin Levemir 17 units subcutaneously every morning, insulin aspart 13 units every morning, 11 units with lunch and 10 units with dinner.    -- continue lower dose mealtime insulin Aspart at 4 units TID w/meals and SSI  -- adjust insulin based on BG  levels  -- currently reasonably controlled, <140     Hypothyroidism.  His most recent TSH on 04/25/2018 was high at 10.08, although free T4 was normal and seems like levohtyroxine dose has been adjusted then by PCP.    -- continue current dose of Levothyroxine    Paroxysmal atrial fibrillation, not on chronic anticoagulation.    His anticoagulation was discontinued 1 year ago due to GI bleeding.  He has been maintained on aspirin 325 mg p.o. daily.    -- continue with PTA Toprol-XL 50 mg p.o. Daily    FEN: continue with IVF today, increase rate to 100 cc/hr and change to 0.45NS    # Pain Assessment:  Current Pain Score 5/25/2018   Patient currently in pain? denies   Pain score (0-10) -   Pain location -   Pain descriptors -   Roc persaud pain level was assessed and he currently denies pain.      DVT Prophylaxis: Pneumatic Compression Devices  Code Status: DNR/DNI    Disposition: Expected discharge in 2-3 days pending culture results and resolution of fever    *addendum: dtr-in-law, Liberty, who is an MD, updated by phone ~4pm    Cindy Kitchen    Interval History   Persistent fever overnight with tmax of 103.   He reports feeling lousy and weak. Denies headache, n/v, abd pain, dysuria, chest pain, sob or cough.     -Data reviewed today: I reviewed all new labs and imaging results over the last 24 hours. I personally reviewed no images or EKG's today.    Physical Exam   Temp: 101.2  F (38.4  C) Temp src: Oral BP: 144/55   Heart Rate: 73 Resp: 16 SpO2: 92 % O2 Device: Nasal cannula Oxygen Delivery: 1 LPM  Vitals:    05/23/18 2140 05/24/18 0535 05/25/18 0505   Weight: 90.7 kg (200 lb) 91.3 kg (201 lb 3.2 oz) 92.2 kg (203 lb 4.8 oz)     Vital Signs with Ranges  Temp:  [99.9  F (37.7  C)-103.4  F (39.7  C)] 101.2  F (38.4  C)  Heart Rate:  [51-86] 73  Resp:  [16-18] 16  BP: (135-171)/(42-69) 144/55  SpO2:  [88 %-95 %] 92 %  I/O last 3 completed shifts:  In: 2070 [I.V.:2070]  Out: 425 [Urine:425]    Constitutional: Alert,  awake and appears weak  Respiratory: Clear to ausculation bilaterally, no wheezing  Cardiovascular: regular rate , no murmur  GI: soft, non-tender and  mildy-distended  Skin/Integumen: warm and dry, there is some petechia on the left lateral shin       Medications     sodium chloride 75 mL/hr at 05/25/18 0505       acyclovir (ZOVIRAX) capsule 400 mg  400 mg Oral BID     amLODIPine  10 mg Oral Daily     aspirin  325 mg Oral Daily     cholecalciferol  1,000 Units Oral Daily     ertapenem (INVanz) IV  1 g Intravenous Q24H     ferrous sulfate  325 mg Oral Daily with breakfast     gemfibrozil  600 mg Oral BID     insulin aspart  1-7 Units Subcutaneous TID AC     insulin aspart  1-5 Units Subcutaneous At Bedtime     insulin aspart  4 Units Subcutaneous QAM     insulin aspart  4 Units Subcutaneous Daily before lunch     insulin aspart  4 Units Subcutaneous Daily with supper     insulin detemir  7 Units Subcutaneous QAM AC     levothyroxine (SYNTHROID/LEVOTHROID) tablet 25 mcg  25 mcg Oral Once per day on Sun Tue Wed Thu Sat     levothyroxine (SYNTHROID/LEVOTHROID) tablet 50 mcg  50 mcg Oral Once per day on Mon Fri     metoprolol succinate  50 mg Oral Daily     sodium chloride (PF)  3 mL Intracatheter Q8H     [START ON 5/26/2018] vancomycin (VANCOCIN) IV  2,000 mg Intravenous Q24H     vancomycin (VANCOCIN) IV  2,500 mg Intravenous Once       Data     Recent Labs  Lab 05/25/18  0705 05/24/18  1838 05/24/18  0701 05/23/18  2140   WBC Canceled, Test credited  --  2.7* 3.0*   HGB Canceled, Test credited 8.1* 8.1* 8.6*   MCV Canceled, Test credited  --  109* 110*   PLT Canceled, Test credited  --  48* 57*     --  139 138   POTASSIUM 3.9  --  3.7 4.3   CHLORIDE 110*  --  109 106   CO2 19*  --  21 22   BUN 28  --  31* 37*   CR 1.38*  --  1.24 1.48*   ANIONGAP 9  --  9 10   MICHELLE 8.2*  --  8.5 9.1   *  --  137* 274*   ALBUMIN  --   --  2.0*  --    PROTTOTAL  --   --  10.0*  --    BILITOTAL  --   --  0.2  --    ALKPHOS   --   --  62  --    ALT  --   --  19  --    AST  --   --  21  --        No results found for this or any previous visit (from the past 24 hour(s)).

## 2018-05-25 NOTE — CONSULTS
[facility]    Infectious Disease Consultation     Date of Admission:  5/23/2018  Date of Consult (When I saw the patient): 05/25/18    Assessment & Plan   Roc Parkinson is a 89 year old male who was admitted on 5/23/2018.     Impression:  1. 89 y.o male with Multiple Myeloma. 2. CAD, CHF, DM.   2. History of recurrent ESBL UTI.   3. Most recently was admitted for another ESBL UTI early May, received 2 weeks of IV ertapenem, just finished course 4-5 days PTA.   4. Admitted this occasion with fevers up to 103. Generalized weakness, no other localizing complaints, no pain. Is unable to tell me if this feels like his regular UTI.   5. UA slightly abnormal, UC and BC pending.   6. On ertapenem given history.   7. ROMINA    Recommendations:   Needs further investigation, distended abdomen on exam, recent antibiotics use, any diarrhea check for C diff, noted he is constipated.   GT chest abdomen and pelvis.    Elevated creat, no MRSA in any cultures. Hold off on the vancomycin.   Follow fevers, symptoms, imaging.       Lor Cooper MD    Reason for Consult   Reason for consult: I was asked by Dr. Kitchen to evaluate this patient for fever.    Primary Care Physician   Dickson Reich    Chief Complaint   Generalized weakness     History is obtained from the patient and medical records    History of Present Illness   Roc Parkinson is a 89 year old male with multiple myeloma, hypertension, dyslipidemia, coronary artery disease, congestive heart failure with preserved ejection fraction, diabetes mellitus type 2, paroxysmal atrial fibrillation on aspirin, hypothyroidism, vitamin D deficiency, chemotherapy-induced pancytopenia, history of recurrent UTI with a history of ESBL E. coli UTI, gastroesophageal reflux disease, and remote history of prostate and bladder cancer who was brought in for evaluation of generalized weakness.  He was found to be febrile, temperature 103.1 in ER and abnormal UA. He is  being admitted for suspected urinary tract infection.   Just 5 days ago he had finished a 2 weeks course for ESBL UTI with IV ertapenem.     Past Medical History   I have reviewed this patient's medical history and updated it with pertinent information if needed.   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  Past Surgical History:   Procedure Laterality Date     ARTHROPLASTY KNEE  11/5/2012    Procedure: ARTHROPLASTY KNEE;  RIGHT TOTAL KNEE ARTHROPLASTY (SMITH & NEPHEW)^;  Surgeon: Dickson Schulte MD;  Location:  OR     BIOPSY      bladder     COLONOSCOPY  2007     COLONOSCOPY N/A 11/15/2016    Procedure: COMBINED COLONOSCOPY, SINGLE OR MULTIPLE BIOPSY/POLYPECTOMY BY BIOPSY;  Surgeon: Kenneth Fulton MD;  Location:  GI     GENITOURINARY SURGERY      TURP x2 and bladder scraping     GI SURGERY      anal fistula     ORTHOPEDIC SURGERY      lt knee in nov.2009     PHACOEMULSIFICATION CLEAR CORNEA WITH STANDARD INTRAOCULAR LENS IMPLANT Left 10/25/2017    Procedure: PHACOEMULSIFICATION CLEAR CORNEA WITH STANDARD INTRAOCULAR LENS IMPLANT;  LEFT EYE PHACOEMULSIFICATION CLEAR CORNEA WITH STANDARD INTRAOCULAR LENS IMPLANT ;  Surgeon: Shady Haider MD;  Location: [facility]     PHACOEMULSIFICATION CLEAR CORNEA WITH STANDARD INTRAOCULAR LENS IMPLANT Right 11/1/2017    Procedure: PHACOEMULSIFICATION CLEAR CORNEA WITH STANDARD INTRAOCULAR LENS IMPLANT;  RIGHT EYE PHACOEMULSIFICATION CLEAR CORNEA WITH STANDARD INTRAOCULAR LENS IMPLANT;  Surgeon: Shady Haider MD;  Location:  [facility]     SOFT TISSUE SURGERY      melanoma       Prior to  Admission Medications   Prior to Admission Medications   Prescriptions Last Dose Informant Patient Reported? Taking?   ACYCLOVIR PO 5/23/2018 at Unknown time Self Yes Yes   Sig: Take 400 mg by mouth 2 times daily Oncology to decide when stop date will be   Ascorbic Acid (VITAMIN C PO) 5/23/2018 at Unknown time Self Yes Yes   Sig: Take 500 mg by mouth daily    B-D U/F 31G X 8 MM insulin pen needle   No No   Sig: USE 5 PEN NEEDLES PER DAY OR AS DIRECTED   Blood Glucose Monitoring Suppl MISC   Yes No   Sig: 3 times daily (before meals)    FREESTYLE LITE test strip   No No   Sig: USE TO TEST FOUR TIMES DAILY   INSULIN ASPART SC 5/23/2018 at Unknown time Self Yes Yes   Sig: Inject 13 Units Subcutaneous every morning    INSULIN ASPART SC 5/23/2018 at Unknown time Self Yes Yes   Sig: Inject 11 Units Subcutaneous daily (before lunch)    INSULIN ASPART SC 5/23/2018 at Unknown time Self Yes Yes   Sig: Inject 5 Units Subcutaneous At Bedtime Inject 5 unit subcutaneously at bedtime for Diabetes II Ok to use Humalog insulin with same order details   INSULIN ASPART SC 5/23/2018 at Unknown time Self Yes Yes   Sig: Inject Subcutaneous 4 times daily Sliding scale:  140-175 1 unit  176-210 2 units  211-245 3 units  246-280 4 units  281-315 5 units  316-350 6 units  351-385 7 units  386-420 8 units  >420 9 units   LENalidomide (REVLIMID) 5 MG CAPS capsule CHEMO   No Yes   Sig: Take 1 capsule (5 mg) by mouth daily for 21 days Take on Days 1 through 21 of 28-day cycle.   LEVOTHYROXINE SODIUM PO 5/23/2018 at Unknown time  Yes Yes   Sig: Take 25 mcg by mouth five times a week Tuesday, Wednesday, Thursday, Saturday, Sunday   LISINOPRIL PO 5/23/2018 at Unknown time Self Yes Yes   Sig: Take 15 mg by mouth daily   Levothyroxine Sodium (SYNTHROID PO) Past Week at Unknown time  Yes Yes   Sig: Take 50 mcg by mouth twice a week Monday and Friday   Selenium 200 MCG TABS 5/23/2018 at Unknown time Self Yes Yes   Sig: Take 100 mcg by mouth daily. Pt  takes one half tab of the 200 mcg daily    VITAMIN D, CHOLECALCIFEROL, PO 5/23/2018 at Unknown time Self Yes Yes   Sig: Take 1,000 Units by mouth daily    amLODIPine (NORVASC) 10 MG tablet 5/23/2018 at Unknown time Self No Yes   Sig: Take 1 tablet (10 mg) by mouth daily   dexamethasone (DECADRON) 4 MG tablet   No Yes   Sig: Take 10 tablets (40 mg) by mouth once a week Once a week with food on Days 1,8,15 and 22.   ferrous sulfate (IRON) 325 (65 FE) MG tablet 5/23/2018 at Unknown time Self Yes Yes   Sig: Take 325 mg by mouth daily (with breakfast)   furosemide (LASIX) 20 MG tablet 5/23/2018 at Unknown time Self No Yes   Sig: TAKE 1 TABLET BY MOUTH EVERY DAY   gemfibrozil (LOPID) 600 MG tablet 5/23/2018 at Unknown time Self No Yes   Sig: TAKE 1 TABLET BY MOUTH TWICE DAILY   insulin aspart (NOVOLOG) 100 UNITS/ML injection 5/23/2018 at Unknown time  No Yes   Sig: Inject 5 Units Subcutaneous daily (with dinner)   Patient taking differently: Inject 9 Units Subcutaneous daily (with dinner)    insulin detemir (LEVEMIR FLEXPEN/FLEXTOUCH) 100 UNIT/ML injection 5/23/2018 at Unknown time  No Yes   Sig: Inject 7 Units Subcutaneous every morning (before breakfast)   Patient taking differently: Inject 10 Units Subcutaneous every morning (before breakfast)    metoprolol succinate (TOPROL-XL) 50 MG 24 hr tablet   No No   Sig: TAKE 1 TABLET(50 MG) BY MOUTH DAILY   polyethylene glycol (MIRALAX/GLYCOLAX) Packet Past Month at Unknown time Self No Yes   Sig: Take 17 g by mouth 2 times daily as needed (constipation)   prochlorperazine (COMPAZINE) 5 MG tablet More than a month at Unknown time  No No   Sig: Take 1 tablet (5 mg) by mouth every 6 hours as needed (Nausea/Vomiting)      Facility-Administered Medications: None     Allergies   No Known Allergies    Immunization History   Immunization History   Administered Date(s) Administered     Influenza (High Dose) 3 valent vaccine 10/01/2015, 10/01/2016, 10/04/2017     Influenza (IIV3) PF  10/01/2013, 09/25/2014     Pneumo Conj 13-V (2010&after) 10/01/2015     Pneumococcal 23 valent 01/01/2010     TD (ADULT, 7+) 08/11/2009       Social History   I have reviewed this patient's social history and updated it with pertinent information if needed. Roc Parkinson  reports that he has never smoked. He has never used smokeless tobacco. He reports that he does not drink alcohol or use illicit drugs.    Family History   I have reviewed this patient's family history and updated it with pertinent information if needed.   Family History   Problem Relation Age of Onset     Cardiovascular Father 81     heart arrythmia     Endocrine Disease Brother 74     Paget's       Review of Systems   The 10 point Review of Systems is negative other than noted in the HPI or here.     Physical Exam   Temp: 101.2  F (38.4  C) Temp src: Oral BP: 144/55   Heart Rate: 73 Resp: 16 SpO2: 92 % O2 Device: Nasal cannula Oxygen Delivery: 1 LPM  Vital Signs with Ranges  Temp:  [99.9  F (37.7  C)-103.4  F (39.7  C)] 101.2  F (38.4  C)  Heart Rate:  [51-86] 73  Resp:  [16-18] 16  BP: (135-171)/(42-69) 144/55  SpO2:  [88 %-95 %] 92 %  203 lbs 4.8 oz  Body mass index is 29.17 kg/(m^2).    GENERAL APPEARANCE:  Fatigued, lethargic   EYES: Eyes grossly normal to inspection, PERRL and conjunctivae and sclerae normal  HENT: ear canals and TM's normal and nose and mouth without ulcers or lesions  NECK: no adenopathy, no asymmetry, masses, or scars and thyroid normal to palpation  RESP: lungs clear to auscultation - no rales, rhonchi or wheezes  CV: regular rates and rhythm, normal S1 S2, no S3 or S4 and no murmur, click or rub  LYMPHATICS: normal ant/post cervical and supraclavicular nodes  ABDOMEN: distended but soft, BS +   MS: extremities normal- no gross deformities noted  SKIN: no suspicious lesions or rashes      Data   Lab Results   Component Value Date    WBC 2.7 (L) 05/25/2018    HGB 8.4 (L) 05/25/2018    HCT 25.7 (L) 05/25/2018      (H) 05/25/2018    PLT 46 (LL) 05/25/2018       Recent Labs  Lab 05/23/18  2215 05/23/18  2140 05/22/18  1249   CULT No growth after 1 day  No growth No growth after 1 day No growth     Recent Labs   Lab Test  05/23/18   2215  05/23/18   2140  05/22/18   1249  05/07/18   0956  01/20/18   0439  01/20/18   0424  01/20/18   0314  12/13/17   1049  12/01/17   1600   CULT  No growth after 1 day  No growth  No growth after 1 day  No growth  >100,000 colonies/mL  Escherichia coli ESBL  *  >100,000 colonies/mL  Strain 2  Escherichia coli ESBL  *  ESBL (extended beta lactamase) producing organisms require contact precautions.  Enterobacteriaceae that are susceptible to meropenem are usually susceptible to ertapenem.  No growth  No growth  >100,000 colonies/mL  Escherichia coli ESBL  *  >100,000 colonies/mL  Strain 2  Escherichia coli ESBL  *  Enterobacteriaceae that are susceptible to meropenem are usually susceptible to ertapenem.  ESBL (extended beta lactamase) producing organisms require contact precautions.  >100,000 colonies/mL  Escherichia coli ESBL  ESBL (extended beta lactamase) producing organisms require contact precautions.  *  50,000 to 100,000 colonies/mL  Escherichia coli ESBL  *  10,000 to 50,000 colonies/mL  Escherichia coli ESBL  *  <10,000 colonies/mL  urogenital janneth    ESBL (extended beta lactamase) producing organisms require contact precautions.

## 2018-05-25 NOTE — PROGRESS NOTES
" 05/25/18 1000   Quick Adds   Type of Visit Initial Occupational Therapy Evaluation   Living Environment   Lives With alone   Living Arrangements apartment;independent living facility   Home Accessibility no concerns   Transportation Available family or friend will provide   Living Environment Comment Pt loives in an ILF, had been IND in ADLs and IADLs and was driving until 2 weeks ago, per family. Pt has had a decline in the last month with a fall while doing laundry and \"a few others\"   Self-Care   Dominant Hand right   Usual Activity Tolerance moderate   Current Activity Tolerance poor   Regular Exercise no   Equipment Currently Used at Home walker, rolling;grab bar   Functional Level Prior   Ambulation 1-->assistive equipment   Transferring 1-->assistive equipment   Toileting 0-->independent   Bathing 0-->independent   Dressing 0-->independent   Eating 0-->independent   Communication 0-->understands/communicates without difficulty   Swallowing 0-->swallows foods/liquids without difficulty   Cognition 0 - no cognition issues reported   Fall history within last six months yes   Number of times patient has fallen within last six months 3   Which of the above functional risks had a recent onset or change? ambulation   Prior Functional Level Comment Pt has check ins from family and receives meal services, family reports more recent visits show pt has been having difficulty with cleaning and bathing   General Information   Onset of Illness/Injury or Date of Surgery - Date 05/23/18   Referring Physician Chante Blas MD   Patient/Family Goals Statement None specifically stated   Additional Occupational Profile Info/Pertinent History of Current Problem From chart: 89 year old male with complex past medical history of multiple myeloma, hypertension, dyslipidemia, coronary artery disease, congestive heart failure with preserved ejection fraction, diabetes mellitus type 2, paroxysmal atrial fibrillation on aspirin, " hypothyroidism, vitamin D deficiency, chemotherapy-induced pancytopenia, history of recurrent UTI with a history of ESBL E. coli UTI, gastroesophageal reflux disease, and remote history of prostate and bladder cancer who was brought in for evaluation of generalized weakness.  He was found to be febrile, temperature 103.1 in ER and abnormal UA. He is being admitted for suspected urinary tract infection.   Precautions/Limitations fall precautions  (contact)   Cognitive Status Examination   Orientation orientation to person, place and time   Level of Consciousness lethargic/somnolent   Able to Follow Commands success, 1-step commands   Cognitive Comment Pt is highly fatigued, required multilple VC to open eyes and attend to tasks   Visual Perception   Visual Perception Comments No reported problems   Pain Assessment   Patient Currently in Pain No   Range of Motion (ROM)   ROM Comment BUE WFL for ADLs   Strength   Strength Comments LE weakness noted with actity, BUE apx 4/5 noted during activities   Coordination   Upper Extremity Coordination No deficits were identified   Mobility   Bed Mobility Comments A of 2   Transfer Skills   Transfer Comments A of 2   Transfer Skill: Bed to Chair/Chair to Bed   Level of [place]: Bed to Chair moderate assist (50% patients effort)   Physical Assist/Nonphysical Assist: Bed to Chair supervision;verbal cues;2 persons   Weight-Bearing Restrictions full weight-bearing   Assistive Device - Transfer Skill Bed to Chair Chair to Bed Rehab Eval rolling walker   Transfer Skill: Sit to Stand   Level of [place]: Sit/Stand moderate assist (50% patients effort)   Physical Assist/Nonphysical Assist: Sit/Stand 2 persons;supervision;verbal cues   Transfer Skill: Sit to Stand full weight-bearing   Assistive Device for Transfer: Sit/Stand rolling walker   Toilet Transfer   Toilet Transfer Comments BSC A of 2 pivot   Balance   Balance Comments Impaired static and dynamic balance   Bathing  "  Level of [place] maximum assist (25% patients effort)   Physical Assist/Nonphysical Assist 2 person assist   Upper Body Dressing   Level of [place]: Dress Upper Body minimum assist (75% patients effort)   Lower Body Dressing   Level of [place]: Dress Lower Body maximum assist (25% patients effort)   Toileting   Level of [place]: Toilet maximum assist (25% patients effort)   Grooming   Level of [place]: Grooming minimum assist (75% patients effort)   Eating/Self Feeding   Level of [place]: Eating minimum assist (75% patients effort)   Instrumental Activities of Daily Living (IADL)   Previous Responsibilities housekeeping;laundry;medication management;finances;driving   Activities of Daily Living Analysis   Impairments Contributing to Impaired Activities of Daily Living balance impaired;cognition impaired;strength decreased;motor control impaired   General Therapy Interventions   Planned Therapy Interventions ADL retraining;cognition;transfer training;progressive activity/exercise   Clinical Impression   Criteria for Skilled Therapeutic Interventions Met yes, treatment indicated   OT Diagnosis Reduced IND in ADLs   Influenced by the following impairments balance impaired;cognition impaired;strength decreased;motor control impaired   Assessment of Occupational Performance 5 or more Performance Deficits   Identified Performance Deficits bathing, dressing, toileting, G/H, bed mobility, transfers/ambulation   Clinical Decision Making (Complexity) Moderate complexity   Therapy Frequency 5 times/wk   Predicted Duration of Therapy Intervention (days/wks) 7 days   Anticipated Discharge Disposition Transitional Care Facility   Risks and Benefits of Treatment have been explained. Yes   Patient, Family & other staff in agreement with plan of care Yes   [facility] AM-PAC TM \"6 Clicks\"   2016, Trustees of [facility], under license to [employer].  All rights reserved.   6 Clicks Short " "Forms Daily Activity Inpatient Short Form   [facility] AM-PAC  \"6 Clicks\" Daily Activity Inpatient Short Form   1. Putting on and taking off regular lower body clothing? 2 - A Lot   2. Bathing (including washing, rinsing, drying)? 2 - A Lot   3. Toileting, which includes using toilet, bedpan or urinal? 2 - A Lot   4. Putting on and taking off regular upper body clothing? 3 - A Little   5. Taking care of personal grooming such as brushing teeth? 3 - A Little   6. Eating meals? 3 - A Little   Daily Activity Raw Score (Score out of 24.Lower scores equate to lower levels of function) 15   Total Evaluation Time   Total Evaluation Time (Minutes) 10     "

## 2018-05-25 NOTE — CONSULTS
[facility]    Palliative Care Consultation     Roc Parkinson  MRN# [id number]  Date of Admission:  2018  Date of Service (when I saw the patient): 18  Reason for consult: Consulted by Dr. Kitchen for Goals of care    Assessment & Plan   Roc Parkinson is a 89 year old male with PMH significant for HTN, HLD, CAD, HF with preserved EF, paroxysmal a.fib on aspirin, DM2, GERD, recent history recurrent UTIs, remote history prostate and bladder CA, MDS, and refractory multiple myeloma recently on therapy with revlamid/dexamethasone, who presents with weakness and fever. Infectious work up is negative to date, but fevers persist despite broad spectrum abx. ID consulted, to obtain CT CAP today. Pt has been hospitalized 4 times in the last 6 months, he has overall decline and failure to thrive. We are consulted for goals of care, per family request.     Symptoms/Recommendations   1. Diarrhea. In light of fever without a known source, and recent abx use, would recommend sending stool for c.diff.     2. Goals of care. Pt continues to wish for restorative cares. He agrees that holding chemotherapy for the foreseeable future is appropriate. He is agreeable to TCU after medical stabilization, with the goal of resuming walking. He also hopes to return to independent living again. Discussed hospice; he does not feel this is the appropriate time for it. I recommended hospice in the near future, should his strength not greatly improve from here.     Support/Coping  -Wife  10 years ago, sounds like she was on life support   -3 adult sons all live out of state; Lev is present and supportive today (he lives in [place]). Son Bam lives in [place] and son Erick lives in [place]   -Pt has a twin brother [place] cities. He also has a sister who is 100 years old   -Mackenzie and spirituality is very important to Roc; will involve Palliative , Saira Springer for additional  support    Decisional Support, Goals of Care, Counseling & Coordination  Decisional Capacity Intact?  -Yes, but likely benefits from having the presence and support of one of his sons during difficult conversations   Health Care Directive on File?  -POLST on file, reviewed   Code Status/Resuscitation Preferences?  -DNR/DNI     Discussion  Visited with son Alfredo in private, while pt was being cared for by nursing and down at CT. Alfredo talked about Roc outliving his original prognosis when diagnosed with MM. He notes that this year has been tough for various reasons (his father in law  in January), but acknowledges Roc's overall decline. He agrees that stopping chemo is probably the right thing. He wonders how much time Roc has to live. Given the frequent hospitalizations this year and overall failure to thrive in the midst of progressive and untreated MM, I am quite worried that time is measured in weeks at this point. Alfredo voiced understanding and acceptance. We talked briefly about hospice and how that can be managed, given limited finances and family support here in the [facility].     I then visited with Roc and son Lev this afternoon, after pt returned from CT. Introduced the scope of our practice to pt and son. Discussed our potential roles for symptom management, support/coping, and decisional support (aka goals of care).     Together we talked about Roc's health. We note that he has MM that was recently noted to be progressing. We discussed his therapy, revlamid/dexamethasone, and how it is likely causing more issues than good. Pt is in support of holding chemotherapy for the foreseeable future, as we worry that he is too weak/deconditioned to tolerate therapy at this point.     In holding chemotherapy, we talk about ways to care for Roc. I share with him information regarding hospice. Educated patient and son regarding hospice philosophy and prognostic criteria. Dispelled  "common myths. Discussed what hospice is (and is not), what services are usually provided (and those that are not, ex \"group home care\"), under what circumstances people tend to enroll, and the variety of places people can get hospice care. We talk about stepping away from chemotherapy with the hospice plan of care.     Roc very much hopes to walk again. He is motivated to work with PT to see if this is possible. He is willing to return to TCU if necessary. I share my concern that this may ultimately be the strongest that Roc will feel moving forward. Expressed concern to him that given the pattern of his recurrent hospitalizations pretty much monthly, this pattern is likely to persist. Expressed concern that should time to live be limited moving forward, he will be spending his time trying to get stronger, when that may in fact not be completely possible. We also note that this plan may detract from him spending his time doing the things that he loves.     Roc ultimately hopes to stabilize and reverse illness at this point, if possible. He is open to ongoing discussion should things not greatly improve this hospitalization. Should he go to TCU, I encourage him to return to Dr. Quinn's clinic to further discuss how his team can continue to best care for him.     Case reviewed with bedside RN Kady, Dr. Kitchen and Dr. Villaseñor.     Thank you for involving us in the care of this patient and family. We will continue to follow. Please do not hesitate to contact me with questions or concerns or the on-call provider for our team if evening or weekend.    Adriana MUNOZ, [facility]  Palliative Medicine   Pager [phone]    Attestation:  Total time on the floor involved in the patient's care: 70 minutes  Total time spent in counseling/care coordination: >50%    Chief Complaint   Weakness, fever     History is obtained from the patient, staff, family and extensive chart review.     Past Medical History    I have " reviewed this patient's medical history and updated it with pertinent information if needed.   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  Past Surgical History:   Procedure Laterality Date     ARTHROPLASTY KNEE  2012    Procedure: ARTHROPLASTY KNEE;  RIGHT TOTAL KNEE ARTHROPLASTY (SMITH & NEPHEW)^;  Surgeon: Dickson Schulte MD;  Location:  OR     BIOPSY      bladder     COLONOSCOPY       COLONOSCOPY N/A 11/15/2016    Procedure: COMBINED COLONOSCOPY, SINGLE OR MULTIPLE BIOPSY/POLYPECTOMY BY BIOPSY;  Surgeon: Kenneth Fulton MD;  Location:  GI     GENITOURINARY SURGERY      TURP x2 and bladder scraping     GI SURGERY      anal fistula     ORTHOPEDIC SURGERY      lt knee in      PHACOEMULSIFICATION CLEAR CORNEA WITH STANDARD INTRAOCULAR LENS IMPLANT Left 10/25/2017    Procedure: PHACOEMULSIFICATION CLEAR CORNEA WITH STANDARD INTRAOCULAR LENS IMPLANT;  LEFT EYE PHACOEMULSIFICATION CLEAR CORNEA WITH STANDARD INTRAOCULAR LENS IMPLANT ;  Surgeon: Shady Haider MD;  Location:  EC     PHACOEMULSIFICATION CLEAR CORNEA WITH STANDARD INTRAOCULAR LENS IMPLANT Right 2017    Procedure: PHACOEMULSIFICATION CLEAR CORNEA WITH STANDARD INTRAOCULAR LENS IMPLANT;  RIGHT EYE PHACOEMULSIFICATION CLEAR CORNEA WITH STANDARD INTRAOCULAR LENS IMPLANT;  Surgeon: Shady Haider MD;  Location:  EC     SOFT TISSUE SURGERY      melanoma       Social History   Living situation: Independent     Family system: Wife  10 years ago, sounds like she was on life support. 3 adult sons all live out of state; Lev is present  and supportive today (he lives in [place]). Son Bam lives in [place] and son Erick lives in [place]     Self-identified support system: As above    Employment/education: [place]    Activities/interests: Loves being independent, was driving up until 2 weeks ago     Use of community resources: None     Roman Catholic affiliation: Hindu     Involvement in galo community: Yes     Impact of illness on patient: Pt has refractory MM, 4 hospitalizations in the last 6 months, overall failure to thrive. He still hopes to resume walking again     Family History   I have reviewed this patient's family history and updated it with pertinent information if needed.   Family History   Problem Relation Age of Onset     Cardiovascular Father 81     heart arrythmia     Endocrine Disease Brother 74     Paget's       Allergies   No Known Allergies    Medications   Current Facility-Administered Medications Ordered in Epic   Medication Dose Route Frequency Last Rate Last Dose     acetaminophen (TYLENOL) Suppository 650 mg  650 mg Rectal Q4H PRN         acetaminophen (TYLENOL) tablet 650 mg  650 mg Oral Q4H PRN   650 mg at 05/25/18 0730     acyclovir (ZOVIRAX) capsule 400 mg  400 mg Oral BID   400 mg at 05/25/18 0916     amLODIPine (NORVASC) tablet 10 mg  10 mg Oral Daily   10 mg at 05/25/18 0916     aspirin tablet 325 mg  325 mg Oral Daily   325 mg at 05/25/18 0916     cholecalciferol (vitamin D3) tablet 1,000 Units  1,000 Units Oral Daily   1,000 Units at 05/25/18 0916     glucose gel 15-30 g  15-30 g Oral Q15 Min PRN        Or     dextrose 50 % injection 25-50 mL  25-50 mL Intravenous Q15 Min PRN        Or     glucagon injection 1 mg  1 mg Subcutaneous Q15 Min PRN         ertapenem (INVanz) 1 g vial to attach to  mL bag  1 g Intravenous Q24H   1 g at 05/24/18 2244     ferrous sulfate (IRON) tablet 325 mg  325 mg Oral Daily with breakfast   325 mg at 05/25/18 0916     gemfibrozil (LOPID) tablet 600 mg  600 mg Oral BID   600 mg at  05/25/18 0916     hydrALAZINE (APRESOLINE) injection 10 mg  10 mg Intravenous Q4H PRN         insulin aspart (NovoLOG) inj (RAPID ACTING)  1-7 Units Subcutaneous TID AC         insulin aspart (NovoLOG) inj (RAPID ACTING)  1-5 Units Subcutaneous At Bedtime         insulin aspart (NovoLOG) inj (RAPID ACTING)  4 Units Subcutaneous QAM   4 Units at 05/25/18 0915     insulin aspart (NovoLOG) inj (RAPID ACTING)  4 Units Subcutaneous Daily before lunch         insulin aspart (NovoLOG) inj (RAPID ACTING)  4 Units Subcutaneous Daily with supper         insulin detemir (LEVEMIR) injection 7 Units  7 Units Subcutaneous QAM AC   7 Units at 05/25/18 0915     levothyroxine (SYNTHROID/LEVOTHROID) tablet 25 mcg  25 mcg Oral Once per day on Sun Tue Wed Thu Sat   25 mcg at 05/24/18 0859     levothyroxine (SYNTHROID/LEVOTHROID) tablet 50 mcg  50 mcg Oral Once per day on Mon Fri   50 mcg at 05/25/18 0927     lidocaine (LMX4) cream   Topical Q1H PRN         lidocaine 1 % 1 mL  1 mL Other Q1H PRN         magnesium hydroxide (MILK OF MAGNESIA) suspension 30 mL  30 mL Oral Daily PRN         melatonin tablet 1 mg  1 mg Oral At Bedtime PRN         metoprolol succinate (TOPROL-XL) 24 hr tablet 50 mg  50 mg Oral Daily   50 mg at 05/25/18 0916     naloxone (NARCAN) injection 0.1-0.4 mg  0.1-0.4 mg Intravenous Q2 Min PRN         ondansetron (ZOFRAN-ODT) ODT tab 4 mg  4 mg Oral Q6H PRN        Or     ondansetron (ZOFRAN) injection 4 mg  4 mg Intravenous Q6H PRN   4 mg at 05/25/18 1056     polyethylene glycol (MIRALAX/GLYCOLAX) Packet 17 g  17 g Oral BID PRN   17 g at 05/24/18 1448     senna-docusate (SENOKOT-S;PERICOLACE) 8.6-50 MG per tablet 1 tablet  1 tablet Oral BID PRN   1 tablet at 05/24/18 1300    Or     senna-docusate (SENOKOT-S;PERICOLACE) 8.6-50 MG per tablet 2 tablet  2 tablet Oral BID PRN         sodium chloride (PF) 0.9% PF flush 3 mL  3 mL Intracatheter Q1H PRN         sodium chloride (PF) 0.9% PF flush 3 mL  3 mL Intracatheter Q8H    3 mL at 05/24/18 0234     sodium chloride 0.9% infusion   Intravenous Continuous 100 mL/hr at 05/25/18 0912       [START ON 5/26/2018] vancomycin (VANCOCIN) 2,000 mg in sodium chloride 0.9 % 500 mL intermittent infusion  2,000 mg Intravenous Q24H         Current Outpatient Prescriptions Ordered in Epic   Medication     metoprolol succinate (TOPROL-XL) 50 MG 24 hr tablet     [DISCONTINUED] cyclophosphamide (CYTOXAN) 50 MG capsule CHEMO       Review of Systems   The comprehensive review of systems is negative other than noted here and in the assessment/plan.    Palliative Symptom Review (0=no symptom/no concern, 1=mild, 2=moderate, 3=severe):  Pain: 0  Fatigue: 3  Nausea: 0  Constipation: 0  Diarrhea: 3  Depressive Symptoms: 0  Anxiety: 0  Drowsiness: 0  Shortness of Breath: 0    Physical Exam   Temp: 98.7  F (37.1  C) Temp src: Oral BP: 144/55   Heart Rate: 73 Resp: 16 SpO2: 92 % O2 Device: None (Room air) Oxygen Delivery: 1 LPM  Vitals:    05/23/18 2140 05/24/18 0535 05/25/18 0505   Weight: 90.7 kg (200 lb) 91.3 kg (201 lb 3.2 oz) 92.2 kg (203 lb 4.8 oz)     CONSTITUTIONAL: Chronically ill elderly man seen lying in bed in NAD, lethargic, yet appropriately responsive and appearing oriented. He is calm and cooperative. Son present   HEENT: NCAT  RESPIRATORY: NL respiratory effort on RA  GASTROINTESTINAL: Soft, BS normoactive, NTND  MUSCULOSKELETAL: Moving freely in bed   NEUROLOGIC: Appropriately responsive during interview  PSYCH: Affect flat     Data   Results for orders placed or performed during the hospital encounter of 05/23/18 (from the past 24 hour(s))   Glucose by meter   Result Value Ref Range    Glucose 113 (H) 70 - 99 mg/dL   Glucose by meter   Result Value Ref Range    Glucose 170 (H) 70 - 99 mg/dL   Hemoglobin   Result Value Ref Range    Hemoglobin 8.1 (L) 13.3 - 17.7 g/dL   Glucose by meter   Result Value Ref Range    Glucose 138 (H) 70 - 99 mg/dL   Lactic acid level STAT for sepsis protocol   Result  Value Ref Range    Lactate for Sepsis Protocol 1.0 0.4 - 1.9 mmol/L   Glucose by meter   Result Value Ref Range    Glucose 106 (H) 70 - 99 mg/dL   Basic metabolic panel   Result Value Ref Range    Sodium 138 133 - 144 mmol/L    Potassium 3.9 3.4 - 5.3 mmol/L    Chloride 110 (H) 94 - 109 mmol/L    Carbon Dioxide 19 (L) 20 - 32 mmol/L    Anion Gap 9 3 - 14 mmol/L    Glucose 103 (H) 70 - 99 mg/dL    Urea Nitrogen 28 7 - 30 mg/dL    Creatinine 1.38 (H) 0.66 - 1.25 mg/dL    GFR Estimate 48 (L) >60 mL/min/1.7m2    GFR Estimate If Black 58 (L) >60 mL/min/1.7m2    Calcium 8.2 (L) 8.5 - 10.1 mg/dL   CBC with platelets   Result Value Ref Range    WBC Canceled, Test credited 4.0 - 11.0 10e9/L    RBC Count Canceled, Test credited 4.4 - 5.9 10e12/L    Hemoglobin Canceled, Test credited 13.3 - 17.7 g/dL    Hematocrit Canceled, Test credited 40.0 - 53.0 %    MCV Canceled, Test credited 78 - 100 fl    MCH Canceled, Test credited 26.5 - 33.0 pg    MCHC Canceled, Test credited 31.5 - 36.5 g/dL    RDW Canceled, Test credited 10.0 - 15.0 %    Platelet Count Canceled, Test credited 150 - 450 10e9/L   Procalcitonin   Result Value Ref Range    Procalcitonin 0.84 ng/ml   Glucose by meter   Result Value Ref Range    Glucose 109 (H) 70 - 99 mg/dL   CBC with platelets   Result Value Ref Range    WBC 2.7 (L) 4.0 - 11.0 10e9/L    RBC Count 2.36 (L) 4.4 - 5.9 10e12/L    Hemoglobin 8.4 (L) 13.3 - 17.7 g/dL    Hematocrit 25.7 (L) 40.0 - 53.0 %     (H) 78 - 100 fl    MCH 35.6 (H) 26.5 - 33.0 pg    MCHC 32.7 31.5 - 36.5 g/dL    RDW 22.0 (H) 10.0 - 15.0 %    Platelet Count 46 (LL) 150 - 450 10e9/L   WBC Differential   Result Value Ref Range    Diff Method Automated Method     % Neutrophils 74.4 %    % Lymphocytes 19.0 %    % Monocytes 4.9 %    % Eosinophils 0.7 %    % Basophils 0.3 %    % Immature Granulocytes 0.7 %    Absolute Neutrophil 2.3 1.6 - 8.3 10e9/L    Absolute Lymphocytes 0.6 (L) 0.8 - 5.3 10e9/L    Absolute Monocytes 0.2 0.0 - 1.3  10e9/L    Absolute Eosinophils 0.0 0.0 - 0.7 10e9/L    Absolute Basophils 0.0 0.0 - 0.2 10e9/L    Abs Immature Granulocytes 0.0 0 - 0.4 10e9/L

## 2018-05-25 NOTE — PLAN OF CARE
Problem: Patient Care Overview  Goal: Plan of Care/Patient Progress Review  Outcome: No Change  A/Ox4, forgetful. Tmax 101.4. Gave prn tylenol x1 overnight (temp fluctuated throughout the night). Other VSS on RA. Denied pain. LS dim. BS active, +flatus. Voiding in urinal at bedside. Ambulates with assist of 1 with gait belt and walker. Does get fatigued fairly quickly. IVF-infusing. On IV abx. Continue to monitor.

## 2018-05-26 NOTE — PLAN OF CARE
Problem: Patient Care Overview  Goal: Plan of Care/Patient Progress Review  Discharge Planner PT   Patient plan for discharge: TCU  Current status: Pt fatigues very quickly, is min assist for bed mobility, transfers, and gait short distances in room, however functional status depends on pt's fatigue level.  Barriers to return to prior living situation: Pt currently needs assistance for all mobility, decreased activity tolerance, decreased balance  Recommendations for discharge: TCU  Rationale for recommendations: Recommend cont inpt rehab for max functional recovery prior to return to prior living situation       Entered by: Damari Lee 05/26/2018 4:15 PM

## 2018-05-26 NOTE — PROGRESS NOTES
Care Transition Initial Assessment -   Reason For Consult: discharge planning, facility placement  Met with: patient & patients granddaughter.    Active Problems:    Fever       DATA  Lives With: alone  Living Arrangements: apartment  Description of Support System: Supportive, Involved  Who is your support system?: Children & grandchildren  Support Assessment: Adequate family and caregiver support. Pt's children & grandchildren all live out of state. Pt's brother lives near by.   Identified issues/concerns regarding health management: Pt is a 89 year old male admitted d/t fever. Pt live alone & cares for himself in an apartment in [place]. Pt's twin brother lives near by & can occasionally help pt as needed. OT/PT recommending TCU for continued therapy. Pt agrees & would like T[facility] referrals [facility] to [facility].   Quality Of Family Relationships: supportive  Transportation Available: family or friend will provide. TBD.    ASSESSMENT  Cognitive Status:  Appeared alert & oriented.   Concerns to be addressed: Discharge planning & facility placement.     PLAN  Financial costs for the patient includes NA.  Patient given options and choices for discharge YES.  Patient/family is agreeable to the plan?  YES.  Patient Goals and Preferences:  Pt would like to d/c to TCU.  Patient anticipates discharging to:  TCU.    JAYDA Villagomez        ADDENDUM  SW:  I: [facility] following for  D/c & placement.  D: [facility] sent referrals via DOD to [facility] & [facility] per pt request.  P: SW continue to follow.    ADDENDUM  SW:  I: following for d/c & placement.   D: [facility] unable to accept pt due to complicated history & needs. If still here on Tuesday 5/29 try back with DON.  P: continue to follow    Addendum:  SW:  I: SW following for d/c.  D: PC to [facility] to f/u on referral, will assess referral on Monday since pt is likely to not d/c for another 2-3 days & call back Monday on referral  status.  P: SW continue to follow.     ADDENDUM  SW:  I: SW following for d/c. SW met with pt's son Alfredo and updated on facility placement. Alfredo had some concerns on days allotted for TCU stays as pt has had many stays over the last year in TCU- primarily at [facility]. SW to see if [facility] has a day count on previous stays. VM left for [facility] inquiring on previous stay dates.    D: SW continue to follow.

## 2018-05-26 NOTE — PLAN OF CARE
Problem: Patient Care Overview  Goal: Plan of Care/Patient Progress Review  A/O x 4, forgetful at times, lethargic. Afebrile this shift. Denies pain. Up with A1 to BSC, incontinent of BM x 3 overnight. 0200 B - up to 101 after apple juice and crackers. Asymptomatic. Left PIV infusing NS at 100. Continue to monitor.

## 2018-05-26 NOTE — PLAN OF CARE
Problem: Patient Care Overview  Goal: Plan of Care/Patient Progress Review  Outcome: No Change  A&O, [place] ([place] Up with A1 to BSC, pivot to chair. Mod CHO diet, insulin adjusted today by hosptialist. Has not received any insulin today, BGM 74, 107, and 126. Plt 42. Incontinent of urine and stool x2. Up in chair for most of day. Contact precautions for ESBL in urine. Family at bedside all day. Denies pain. Worked with PT, wants to become stronger.

## 2018-05-26 NOTE — PROGRESS NOTES
05/26/18 1558   Quick Adds   Type of Visit Initial PT Evaluation   Living Environment   Lives With alone   Living Arrangements apartment;independent living facility   Home Accessibility no concerns   Transportation Available family or friend will provide   Living Environment Comment Pt lives in independent living facility, has been independent with use of 4 wheeled walker for ambulation, driving up until 2 weeks ago. Pt reports no falls, pt's family reports a fall last month doing laundry and a few others.   Self-Care   Dominant Hand right   Usual Activity Tolerance moderate   Current Activity Tolerance poor   Regular Exercise no   Equipment Currently Used at Home walker, rolling   Functional Level Prior   Ambulation 1-->assistive equipment   Transferring 1-->assistive equipment   Fall history within last six months yes   Prior Functional Level Comment Pt's family checks on him, he receives meal services. Per family pt has been having increasing difficulty with cleaning and bathing.   General Information   Onset of Illness/Injury or Date of Surgery - Date 05/23/18   Referring Physician Chante Blas MD   Patient/Family Goals Statement Patient wants to be able to walk again with walker independently   Pertinent History of Current Problem (include personal factors and/or comorbidities that impact the POC) From chart: 89 year old male with complex past medical history of multiple myeloma, hypertension, dyslipidemia, coronary artery disease, congestive heart failure with preserved ejection fraction, diabetes mellitus type 2, paroxysmal atrial fibrillation on aspirin, hypothyroidism, vitamin D deficiency, chemotherapy-induced pancytopenia, history of recurrent UTI with a history of ESBL E. coli UTI, gastroesophageal reflux disease, and remote history of prostate and bladder cancer who was brought in for evaluation of generalized weakness.  He was found to be febrile, temperature 103.1 in ER and abnormal UA. He  is being admitted for suspected urinary tract infection.   Precautions/Limitations fall precautions   Weight-Bearing Status - LUE weight-bearing as tolerated   Weight-Bearing Status - RUE weight-bearing as tolerated   Weight-Bearing Status - LLE weight-bearing as tolerated   Weight-Bearing Status - RLE weight-bearing as tolerated   Cognitive Status Examination   Orientation orientation to person, place and time   Level of Consciousness alert  (somewhat sleepy)   Follows Commands and Answers Questions 100% of the time   Pain Assessment   Patient Currently in Pain No   Posture    Posture Forward head position;Protracted shoulders   Range of Motion (ROM)   ROM Comment Pt with B LE ROM WFL   Strength   Strength Comments Strength grossly 3/5 B LE   Bed Mobility   Bed Mobility Comments Supine to sit min assist of 1, increased time   Transfer Skills   Transfer Comments Sit to stand, stand pivot txfrs min assist with RW   Gait   Gait Comments Amb 20' with RW and min assist   Balance   Balance Comments Able to maintain static sitting balance with UE support, needing min assist to maintain standing balance safely   Sensory Examination   Sensory Perception Comments Denies numbness or tingling   General Therapy Interventions   Planned Therapy Interventions balance training;bed mobility training;gait training;ROM;strengthening;transfer training   Clinical Impression   Criteria for Skilled Therapeutic Intervention yes, treatment indicated   PT Diagnosis Impaired functional independence   Influenced by the following impairments Decreased strength, decreased balance, decreased activity tolerance   Functional limitations due to impairments Overall decreased independence with bed mobility, transfers, ambulation   Clinical Presentation Evolving/Changing   Clinical Decision Making (Complexity) Moderate complexity   Therapy Frequency` 5 times/week   Predicted Duration of Therapy Intervention (days/wks) 1 week   Anticipated Discharge  "Disposition Transitional Care Facility   Risk & Benefits of therapy have been explained Yes   Patient, Family & other staff in agreement with plan of care Yes   [facility] AM-PAC TM \"6 Clicks\"   2016, Trustees of [facility], under license to [employer].  All rights reserved.   6 Clicks Short Forms Basic Mobility Inpatient Short Form   [facility] AM-PAC  \"6 Clicks\" V.2 Basic Mobility Inpatient Short Form   1. Turning from your back to your side while in a flat bed without using bedrails? 3 - A Little   2. Moving from lying on your back to sitting on the side of a flat bed without using bedrails? 3 - A Little   3. Moving to and from a bed to a chair (including a wheelchair)? 3 - A Little   4. Standing up from a chair using your arms (e.g., wheelchair, or bedside chair)? 3 - A Little   5. To walk in hospital room? 3 - A Little   6. Climbing 3-5 steps with a railing? 2 - A Lot   Basic Mobility Raw Score (Score out of 24.Lower scores equate to lower levels of function) 17   Total Evaluation Time   Total Evaluation Time (Minutes) 10     "

## 2018-05-26 NOTE — PROGRESS NOTES
" INFECTIOUS DISEASE Progress Note  May 26, 2018  [id number]  Roc Parkinson    ANTIBIOTICS:  none    SUBJECTIVE: afebrile today, notes reviewed, temps to 103 yesterday; ertapenem stopped today    OBJECTIVE:  /50 (BP Location: Right arm)  Temp 97.5  F (36.4  C) (Oral)  Resp 18  Ht 1.778 m (5' 10\")  Wt 96.7 kg (213 lb 1.6 oz)  SpO2 94%  BMI 30.58 kg/m2      LAB Data:  WBC 3.4  Creat 1.2    MICROBIOLOGY:  BC x 2 NG  UC NG  IMAGING:      Attestation:  I have reviewed today's vital signs, notes, medications, labs and imaging.    ASSESSMENT:  1. FEVERS-better, was on ertapenem and recently completed ertapenem course for ESBL + UTI, now off antibiotics, Cxs negative, fever better  2. MYELOMA-Has been on Revlimid  3. RECENT ESBL + E.COLI UTI    PLAN  1. Follow off antibiotics  2. F/u temps  3. reassess    TIMO CR M.D.  O:[phone]   B:[phone]          "

## 2018-05-26 NOTE — PROGRESS NOTES
[facility]    Internal Medicine Hospitalist Progress Note  05/26/2018  I evaluated patient on the above date.    Guy Conley Jr., MD  [phone] (p)  Text Page      Assessment & Plan   Mr. Roc Parkinson is a 89 year old male with medical history including multiple myeloma, DM2, HTN, CHF, PAF, h/o prostate and bladder cancer, and h/o recurrent UTI's including recent ESBL producing producing E. Coli, who presented 5/23/2018 with weakness and found with fever and abnormal UA.     Fevers with weakness with concern for sepsis - ?due to chemo (Revlimid).  Presented 5/23 with generalized weakness and fever of 103.1; WBC low but not neutropenic; initial lactic acid of 2.6; UA mildly abnormal. CXR was negative for acute infiltrate. Hx of recurrent UTI with recent ESBL E. Coli and had completed a course of IV Ertapenem on 5/20/18 and was thus started empirically on ertapenem on 5/23. UC from 5/23 was ultimately negative. Given 1 dose vanco 5/25. Procalcitonin 0.84 (moderate risk) on 5/25. BC's so far negative as of 5/26. Of note, pt recently started on Revlimid (lenalidomide) on 5/21 which can have adverse reaction of fever. Seen by Oncology 5/24 and chemo held. CT CAP on 5/25 negative for signs of focal infection. Afebrile since am 5/25.  Micro: BC's 5/23 NGTD. UC 5/23 negative. BC 5/25 NGTD. C. Diff toxin B 5/25 negative.  - Try stopping ertapenem (started 5/23).  - Monitor clinically for signs of focal infection; and if so, unless UTI suspected, favor a different antibiotic than a carbepenem.  - Monitor cultures.     Multiple myeloma/myelodysplastic syndrome.   Pancytopenia suspect due to above and chemotherapy.  He is being followed by Dr. Quinn of Hematology/Oncology. He has been on multiple chemotherapy regimens. As per the last notes, he was recently started on Revlimid daily on 5/21 and planned for 21-day course, then 7 days off, and he also was taking dexamethasone weekly. Seen by Oncology 5/24  and Revlimid and dexamethasone held.   Recent Labs  Lab 05/26/18  0710 05/25/18  0755 05/25/18  0705 05/24/18  1838 05/24/18  0701 05/23/18 2140   HGB 8.1* 8.4* Canceled, Test credited 8.1* 8.1* 8.6*   WBC 3.4* 2.7* Canceled, Test credited  --  2.7* 3.0*   PLT 42* 46* Canceled, Test credited  --  48* 57*   - Continue to hold Revlimid and dexamethasone.  - Appreciate help from  Oncology.  - Monitor CBC.  - Defer to Oncology regarding transfusions: consider prbc transfusion if hgb </= 7.0 or if significant bleeding with hemodynamic instability or if symptomatic; and consider platelet transfusion if needs a procedure and less than 50,000; or if less than 10,000.     Acute kidney injury stage 1 on chronic kidney disease, suspect prerenal.  His baseline creatinine seems to be between 0.9-1.2. His creatinine was 1.48 on admission.   Recent Labs  Lab 05/26/18  0710 05/25/18  0705 05/24/18  0701 05/23/18 2140   CR 1.24 1.38* 1.24 1.48*   - Holding PTA Lasix and lisinopril for now.  - Monitor BMP.  - Avoid nephrotoxic medications.    Weakness and deconditioning.  Pt extremely weak.  - Treat above issues.  - Continue PT and OT.    DM2 w/o complications.  [PTA: Levemir 17 units subcutaneously every morning; insulin aspart 13U every morning, 11U with lunch, 9U with dinner, 5U at HS.]  His most recent hemoglobin A1c was 5.5 in March 2018. Glucose trending low on 5/25 evening.  Recent Labs   Lab Test  05/26/18   0805  05/26/18   0710  05/26/18   0236  05/26/18   0209  05/25/18   2154 05/25/18   1837 05/25/18   0705   05/24/18   0701   05/23/18 2140 05/16/18   1538   GLC   --   74   --    --    --    --    --   103*   --   137*   --   274*  76   BGM  74   --   101*  67*  102*  84   < >   --    < >   --    < >   --    --     < > = values in this interval not displayed.   - Hold Levemir and premeal aspart.  - Continue ISS - change from medium to low.     Hypertension (benign essential).  History of diastolic CHF with  "preserved ejection fraction of 55-60%.   [PTA: amlodipine 10 mg daily; furosemide 20 mg daily; lisinopril 15 mg daily; metoprolol XL 50 mg daily.]  - Continue amlodipine, metoprolol.  - Holding furosemide and lisinopril due to fluctuating renal function and weakness.  - Continue PRN IV hydralazine.  - Monitor i/o's, daily wts.     Dyslipidemia.    - Hold Lopid for now given weakness.     Hypothyroidism.    His most recent TSH on 4/25/2018 was high at 10.08, although free T4 was normal and seems like levothyroxine dose was adjusted then by PCP. TSH 5/24 normal.  - Continue current dose of levothyroxine.     Paroxysmal atrial fibrillation.    His anticoagulation was discontinued 1 year ago due to GI bleeding.    - Continue ASA and metoprolol.    Prophylaxis.  - PCD's, ambulation.    CODE STATUS: DNR/DNI.    Dispo.  - Pending above.  - 2-3 more days.    # Pain Assessment:  Current Pain Score 5/26/2018   Patient currently in pain? denies   Pain score (0-10) -   Pain location -   Pain descriptors -   Roc persaud pain level was assessed and he currently denies pain.        Interval History   Pt doing OK but feels extremely weak. Eating OK.    -Data reviewed today: I reviewed all new labs and imaging over the last 24 hours. I personally reviewed no images or EKG's today.    Physical Exam   Heart Rate: 71, Blood pressure 131/51, temperature 99.6  F (37.6  C), temperature source Oral, resp. rate 18, height 1.778 m (5' 10\"), weight 96.7 kg (213 lb 1.6 oz), SpO2 93 %.  Vitals:    05/24/18 0535 05/25/18 0505 05/26/18 0551   Weight: 91.3 kg (201 lb 3.2 oz) 92.2 kg (203 lb 4.8 oz) 96.7 kg (213 lb 1.6 oz)     Vital Signs with Ranges  Temp:  [96.9  F (36.1  C)-99.6  F (37.6  C)] 99.6  F (37.6  C)  Heart Rate:  [54-73] 71  Resp:  [16-18] 18  BP: (106-138)/(45-58) 131/51  SpO2:  [91 %-95 %] 93 %  Patient Vitals for the past 24 hrs:   BP Temp Temp src Heart Rate Resp SpO2 Weight   05/26/18 0853 131/51 - - 71 - - -   05/26/18 0737 138/58 " 99.6  F (37.6  C) Oral 73 18 93 % -   05/26/18 0551 - - - - - - 96.7 kg (213 lb 1.6 oz)   05/25/18 2353 134/54 98.3  F (36.8  C) Oral 64 16 91 % -   05/25/18 1537 106/45 96.9  F (36.1  C) Oral 54 18 92 % -   05/25/18 1200 108/45 98.2  F (36.8  C) Oral 60 18 95 % -     I/O's Last 24 hours  I/O last 3 completed shifts:  In: 2267 [P.O.:360; I.V.:1907]  Out: 450 [Urine:450]    Constitutional: Alert, oriented, flat affect.  Respiratory: Diminished in bases. No crackles or wheezes.  Cardiovascular: RRR no m/r/g.  GI: Soft, nt, nd, +BS.  Skin/Integumen: Trace bilateral lower extremity edema.  Other:        Data     Recent Labs  Lab 05/26/18  0710 05/25/18  0755 05/25/18  0705  05/24/18  0701   WBC 3.4* 2.7* Canceled, Test credited  --  2.7*   HGB 8.1* 8.4* Canceled, Test credited  < > 8.1*   * 109* Canceled, Test credited  --  109*   PLT 42* 46* Canceled, Test credited  --  48*     --  138  --  139   POTASSIUM 3.8  --  3.9  --  3.7   CHLORIDE 113*  --  110*  --  109   CO2 18*  --  19*  --  21   BUN 25  --  28  --  31*   CR 1.24  --  1.38*  --  1.24   ANIONGAP 8  --  9  --  9   MICHELLE 8.0*  --  8.2*  --  8.5   GLC 74  --  103*  --  137*   ALBUMIN  --   --   --   --  2.0*   PROTTOTAL  --   --   --   --  10.0*   BILITOTAL  --   --   --   --  0.2   ALKPHOS  --   --   --   --  62   ALT  --   --   --   --  19   AST  --   --   --   --  21   < > = values in this interval not displayed.  Recent Labs   Lab Test  05/26/18   0805  05/26/18   0710  05/26/18   0236  05/26/18   0209  05/25/18   2154  05/25/18   1837   05/25/18   0705   05/24/18   0701   05/23/18   2140  05/16/18   1538   GLC   --   74   --    --    --    --    --   103*   --   137*   --   274*  76   BGM  74   --   101*  67*  102*  84   < >   --    < >   --    < >   --    --     < > = values in this interval not displayed.         Recent Results (from the past 24 hour(s))   CT Chest Abdomen Pelvis w/o Contrast    Narrative    CT CHEST, ABDOMEN, AND PELVIS  WITHOUT CONTRAST 5/25/2018 12:21 PM     HISTORY: Persistent fever. Evaluate for possible source of infection.    COMPARISON: May 25, 2018    TECHNIQUE: Volumetric helical acquisition of CT images from the lung  apices through the symphysis pubis without contrast. Radiation dose  for this scan was reduced using automated exposure control, adjustment  of the mA and/or kV according to patient size, or iterative  reconstruction technique.    FINDINGS:   Chest: Bilateral calcified pleural plaques noted which are nonspecific  but may be related to prior asbestos exposure. Minimal peripheral  fibrosis and/or atelectasis noted. Trace pleural fluid or pleural  thickening bilaterally. No pericardial effusion. Heart may be generous  in size. There are moderate atherosclerotic changes of the visualized  aorta and its branches. There is no evidence of aortic aneurysm.    Abdomen and pelvis: Spleen is enlarged measuring 18.1 cm. There is  cholelithiasis without evidence for cholecystitis. The liver, spleen,  adrenal glands, kidneys, and pancreas demonstrate no worrisome focal  lesion in the absence of intravenous contrast. 3 mm nonobstructing  stone in the inferior pole of the right kidney. Tiny simple cysts in  both kidneys. There are no dilated loops of small intestine or large  bowel to suggest ileus or obstruction. No free air or free fluid.    Survey of the visualized bony structures demonstrates no destructive  bony lesions.      Impression    IMPRESSION:  1. Marked splenomegaly at 18.1 cm.  2. Bilateral calcified pleural plaques which are nonspecific but may  be related to asbestos exposure.  3. Otherwise no convincing occult infection demonstrated in the chest,  abdomen, and pelvis.    DAQUAN KENNEDY MD       Medications   All medications were reviewed.    sodium chloride 100 mL/hr at 05/26/18 0856       acyclovir (ZOVIRAX) capsule 400 mg  400 mg Oral BID     amLODIPine  10 mg Oral Daily     aspirin  325 mg Oral Daily      cholecalciferol  1,000 Units Oral Daily     ertapenem (INVanz) IV  1 g Intravenous Q24H     ferrous sulfate  325 mg Oral Daily with breakfast     gemfibrozil  600 mg Oral BID     insulin aspart  1-7 Units Subcutaneous TID AC     insulin aspart  1-5 Units Subcutaneous At Bedtime     insulin aspart  4 Units Subcutaneous QAM     insulin aspart  4 Units Subcutaneous Daily before lunch     insulin aspart  4 Units Subcutaneous Daily with supper     insulin detemir  7 Units Subcutaneous QAM AC     levothyroxine (SYNTHROID/LEVOTHROID) tablet 25 mcg  25 mcg Oral Once per day on Sun Tue Wed Thu Sat     levothyroxine (SYNTHROID/LEVOTHROID) tablet 50 mcg  50 mcg Oral Once per day on Mon Fri     metoprolol succinate  50 mg Oral Daily     sodium chloride (PF)  3 mL Intracatheter Q8H

## 2018-05-26 NOTE — PLAN OF CARE
Problem: Infection, Risk/Actual (Adult)  Goal: Infection Prevention/Resolution  Patient will demonstrate the desired outcomes by discharge/transition of care.  Outcome: No Change  VSS, afebrile. Orientated x4, lethargic but arousable. Up w/ 1-2GB/walker. Fair PO intake for supper. Diarrhea improving, c diff back negative. ID following, continuing IV Invanz. Will continue to monitor and support.

## 2018-05-27 NOTE — PROGRESS NOTES
Service Date: 05/26/2018      SUBJECTIVE:    Mr. Parkinson is a 89-year-old gentleman with IgM kappa multiple myeloma diagnosed in 11/2016.  He has been on multiple different treatments.  Most recently, he has been on Revlimid 5 mg a day along with dexamethasone 40 mg weekly.      Patient was admitted to the hospital with progressive weakness.  On admission, he was found to have fever.  Labs revealed pancytopenia.      Patient has had multiple workup to look for infection.  So far all his cultures have been negative.  During last hospitalization, he was found to have ESBL E. coli UTI. Patient was treated for UTI.  He was on Invanz.  That has now been stopped.  ID is following.      Patient is pancytopenic.  This is secondary to multiple myeloma and Revlimid.  Some of it could be also due to other drugs including antibiotics.      Patient's overall condition has not improved.  He continues to be very weak.  He continues to have fever.  Appetite not good.  Most of the time he just lies in bed and sleeps.      Patient denies any headache.  Denies any pain.  No chest pain.  No shortness of breath.  No recurrent vomiting.  Appetite decreased.      PHYSICAL EXAMINATION:   GENERAL:  He was alert and oriented.  Looked very weak.   VITAL SIGNS:  Reviewed.    Rest of the systems not examined.      LABORATORY DATA:  Reviewed.      ASSESSMENT:   1.  A 89-year-old gentleman with IgM kappa multiple myeloma.   2.  Pancytopenia secondary to myeloma, Revlimid and other medications.   3.  Febrile illness.   4.  Worsening weakness secondary to multiple myeloma and his age.      PLAN:   1.  I discussed with the patient and his son (one from [place]) regarding patient's overall condition.  Patient's overall condition has not improved.  He continues to be very weak.  Labs were all reviewed.  He continues to be pancytopenic.     Given patient's age and malignancy, I am not expecting any significant improvement.  I do not think patient's  overall condition will improve.      Discussed regarding further treatment for myeloma.  I would not recommend any more treatment for myeloma.  I told the patient that we will stop any further treatment.  He and his son are both agreeable.  For malignancy, will continue him on supportive treatment.     2. Patient is pancytopenic.  It is likely that his pancytopenia will get worse.  At this time, will continue with supportive treatment with transfusion as needed.  Patient in future can decide not to get transfusion.     3. Patient and son had few questions, which were all answered.  If his overall condition deteriorates in the next few days, will have a discussion regarding comfort care and hospice.      TOTAL TIME SPENT: 25 minutes, more than 50% of the time spent in counseling and coordination of care.         NEW CLEMENTS MD             D: 2018   T: 2018   MT:       Name:     JACOB MEDELLIN   MRN:      [id number]        Account:      [id number]   :      1926           Service Date: 2018      Document: [id number]

## 2018-05-27 NOTE — PROGRESS NOTES
[facility]    Internal Medicine Hospitalist Progress Note  05/27/2018  I evaluated patient on the above date.    Guy Conley Jr., MD  [phone] (p)  Text Page      Assessment & Plan   Mr. Roc Parkinson is a 89 year old male with medical history including multiple myeloma, DM2, HTN, CHF, PAF, h/o prostate and bladder cancer, and h/o recurrent UTI's including recent ESBL producing producing E. Coli, who presented 5/23/2018 with weakness and found with fever and abnormal UA.     Fevers with weakness with concern for sepsis - ?due to chemo (Revlimid).  Presented 5/23 with generalized weakness and fever of 103.1; WBC low but not neutropenic; initial lactic acid of 2.6; UA mildly abnormal. CXR was negative for acute infiltrate. Hx of recurrent UTI with recent ESBL E. Coli and had completed a course of IV Ertapenem on 5/20/18 and was thus started empirically on ertapenem on 5/23. UC from 5/23 was ultimately negative. Given 1 dose vanco 5/25. Procalcitonin 0.84 (moderate risk) on 5/25. BC's so far negative as of 5/26. Of note, pt recently started on Revlimid (lenalidomide) on 5/21 which can have adverse reaction of fever. Seen by Oncology 5/24 and chemo held. CT CAP on 5/25 negative for signs of focal infection. Afebrile since am 5/25. Ertapenem stopped 5/26.  Micro: BC's 5/23 NGTD. UC 5/23 negative. BC 5/25 NGTD. C. Diff toxin B 5/25 negative.  - Monitor clinically for signs of focal infection; and if so, unless UTI suspected, favor a different antibiotic than a carbepenem.  - Monitor cultures.  - Appreciate help from Dr. Corado.    AMS with hallucinations, suspect acute delirium vs toxic encephalopathy.  Pt with hallucinations 5/27 but pt aware of them. Son 5/27 noted that he had been having hallucinations since PTA but worse on 5/27. ?side effect from ertapenem (stopped 5/26).  - Start PRN Seroquel 12.5 mg BID.     Multiple myeloma/myelodysplastic syndrome.   Pancytopenia suspect due to above  and chemotherapy.  He is being followed by Dr. Quinn of Hematology/Oncology. He has been on multiple chemotherapy regimens. As per the last notes, he was recently started on Revlimid daily on 5/21 and planned for 21-day course, then 7 days off, and he also was taking dexamethasone weekly. Seen by Oncology 5/24 and Revlimid and dexamethasone held.   Recent Labs  Lab 05/27/18  0628 05/26/18  0710 05/25/18  0755 05/25/18  0705 05/24/18  1838 05/24/18  0701 05/23/18  2140   HGB 7.9* 8.1* 8.4* Canceled, Test credited 8.1* 8.1* 8.6*   WBC 3.1* 3.4* 2.7* Canceled, Test credited  --  2.7* 3.0*   PLT 38* 42* 46* Canceled, Test credited  --  48* 57*   - After further d/w Dr. Villaseñor 5/26, no plans for further treatment.  - Appreciate help from  Oncology, Dr. Villaseñor.  - Monitor CBC.  - Defer to Oncology regarding transfusions: consider prbc transfusion if hgb </= 7.0 or if significant bleeding with hemodynamic instability or if symptomatic; and consider platelet transfusion if needs a procedure and less than 50,000; or if less than 10,000.     Acute kidney injury stage 1 on chronic kidney disease, suspect prerenal.  His baseline creatinine seems to be between 0.9-1.2. His creatinine was 1.48 on admission.   Recent Labs  Lab 05/27/18  0628 05/26/18  0710 05/25/18  0705 05/24/18  0701 05/23/18  2140   CR 1.08 1.24 1.38* 1.24 1.48*   - Holding PTA Lasix and lisinopril for now.  - Monitor BMP.  - Avoid nephrotoxic medications.    Weakness and deconditioning.  Pt extremely weak.  - Treat above issues.  - Continue PT and OT.  - Plan TCU.    DM2 w/o complications.  [PTA: Levemir 17 units subcutaneously every morning; insulin aspart 13U every morning, 11U with lunch, 9U with dinner, 5U at HS.]  His most recent hemoglobin A1c was 5.5 in March 2018. Glucose trending low on 5/25 evening. Levemir and prandial aspart held 5/26.  Recent Labs   Lab Test  05/27/18   1151  05/27/18   0725  05/27/18   0628  05/27/18   0249  05/26/18   2218   05/26/18   1552   05/26/18   0710   05/25/18   0705   05/24/18   0701   05/23/18   2140   GLC   --    --   98   --    --    --    --   74   --   103*   --   137*   --   274*   BGM  127*  92   --   100*  131*  126*   < >   --    < >   --    < >   --    < >   --     < > = values in this interval not displayed.   - Continue off Levemir and prandial aspart.  - Continue ISS, low.     Hypertension (benign essential).  History of diastolic CHF with preserved ejection fraction of 55-60%.   [PTA: amlodipine 10 mg daily; furosemide 20 mg daily; lisinopril 15 mg daily; metoprolol XL 50 mg daily.]  - Continue amlodipine, metoprolol.  - Holding furosemide and lisinopril due to fluctuating renal function and weakness.  - Continue PRN IV hydralazine.  - Monitor i/o's, daily wts.     Dyslipidemia.    - Holding Lopid for now given weakness.     Hypothyroidism.    His most recent TSH on 4/25/2018 was high at 10.08, although free T4 was normal and seems like levothyroxine dose was adjusted then by PCP. TSH 5/24 normal.  - Continue current dose of levothyroxine.     Paroxysmal atrial fibrillation.    His anticoagulation was discontinued 1 year ago due to GI bleeding.    - Continue ASA and metoprolol.    Prophylaxis.  - PCD's, ambulation.    CODE STATUS: DNR/DNI.    Dispo.  - Pending above.  - Possibly TCU 1-2d if remains afebrile and mentation improved.    # Pain Assessment:  Current Pain Score 5/26/2018   Patient currently in pain? denies   Pain score (0-10) -   Pain location -   Pain descriptors -   Roc persaud pain level was assessed and he currently denies pain.      Communication.  - I d/w pt's son 5/27.      Interval History   Feeling a bit better today.  Son notes pt hallucinating, was talking with a quarterback earlier today. Pt was aware of the hallucination. Son notes previous episodes recently but more mild in the past, not as pronounced as today.    -Data reviewed today: I reviewed all new labs and imaging over the last 24  "hours. I personally reviewed no images or EKG's today.    Physical Exam   Heart Rate: 65, Blood pressure 140/65, temperature 98.1  F (36.7  C), temperature source Oral, resp. rate 17, height 1.778 m (5' 10\"), weight 96.7 kg (213 lb 1.6 oz), SpO2 91 %.  Vitals:    05/24/18 0535 05/25/18 0505 05/26/18 0551   Weight: 91.3 kg (201 lb 3.2 oz) 92.2 kg (203 lb 4.8 oz) 96.7 kg (213 lb 1.6 oz)     Vital Signs with Ranges  Temp:  [97.5  F (36.4  C)-99.5  F (37.5  C)] 98.1  F (36.7  C)  Heart Rate:  [62-68] 65  Resp:  [17-18] 17  BP: (115-150)/(46-65) 140/65  SpO2:  [91 %-94 %] 91 %  Patient Vitals for the past 24 hrs:   BP Temp Temp src Heart Rate Resp SpO2   05/27/18 1110 140/65 98.1  F (36.7  C) Oral 65 17 91 %   05/27/18 0727 145/63 98.2  F (36.8  C) Oral 62 18 91 %   05/26/18 2212 143/64 98.5  F (36.9  C) Oral 66 18 94 %   05/26/18 2035 117/46 99.5  F (37.5  C) Oral 64 18 93 %   05/26/18 1716 115/50 97.5  F (36.4  C) Oral 67 18 94 %   05/26/18 1241 150/65 98.8  F (37.1  C) Axillary 68 18 92 %     I/O's Last 24 hours  I/O last 3 completed shifts:  In: 280 [P.O.:280]  Out: 600 [Urine:600]    Constitutional: Alert, oriented, fatigued.  Respiratory: Diminished in bases. No crackles or wheezes.  Cardiovascular: RRR no m/r/g.  GI: Soft, nt, nd, +BS.  Skin/Integumen:   Other:        Data     Recent Labs  Lab 05/27/18  0628 05/26/18  0710 05/25/18  0755 05/25/18  0705  05/24/18  0701   WBC 3.1* 3.4* 2.7* Canceled, Test credited  --  2.7*   HGB 7.9* 8.1* 8.4* Canceled, Test credited  < > 8.1*   * 108* 109* Canceled, Test credited  --  109*   PLT 38* 42* 46* Canceled, Test credited  --  48*    139  --  138  --  139   POTASSIUM 3.8 3.8  --  3.9  --  3.7   CHLORIDE 115* 113*  --  110*  --  109   CO2 18* 18*  --  19*  --  21   BUN 18 25  --  28  --  31*   CR 1.08 1.24  --  1.38*  --  1.24   ANIONGAP 8 8  --  9  --  9   MICHELLE 7.7* 8.0*  --  8.2*  --  8.5   GLC 98 74  --  103*  --  137*   ALBUMIN  --   --   --   --   --  2.0* "   PROTTOTAL  --   --   --   --   --  10.0*   BILITOTAL  --   --   --   --   --  0.2   ALKPHOS  --   --   --   --   --  62   ALT  --   --   --   --   --  19   AST  --   --   --   --   --  21   < > = values in this interval not displayed.  Recent Labs   Lab Test  05/27/18   1151  05/27/18   0725  05/27/18   0628  05/27/18   0249  05/26/18   2212  05/26/18   1552   05/26/18   0710   05/25/18   0705   05/24/18   0701   05/23/18   2140   GLC   --    --   98   --    --    --    --   74   --   103*   --   137*   --   274*   BGM  127*  92   --   100*  131*  126*   < >   --    < >   --    < >   --    < >   --     < > = values in this interval not displayed.         No results found for this or any previous visit (from the past 24 hour(s)).    Medications   All medications were reviewed.      acyclovir (ZOVIRAX) capsule 400 mg  400 mg Oral BID     amLODIPine  10 mg Oral Daily     aspirin  325 mg Oral Daily     cholecalciferol  1,000 Units Oral Daily     ferrous sulfate  325 mg Oral Daily with breakfast     insulin aspart  1-3 Units Subcutaneous TID AC     insulin aspart  1-3 Units Subcutaneous At Bedtime     levothyroxine (SYNTHROID/LEVOTHROID) tablet 25 mcg  25 mcg Oral Once per day on Sun Tue Wed Thu Sat     levothyroxine (SYNTHROID/LEVOTHROID) tablet 50 mcg  50 mcg Oral Once per day on Mon Fri     metoprolol succinate  50 mg Oral Daily     sodium chloride (PF)  3 mL Intracatheter Q8H

## 2018-05-27 NOTE — PROGRESS NOTES
Service Date: 2018      SUBJECTIVE:  Mr. Medellin is a 89-year-old gentleman with multiple myeloma diagnosed in 2016.  He has received multiple different chemotherapy.  Most recently, he was on Revlimid and dexamethasone.  That has been now discontinued.      Patient admitted with weakness and fever.  Cultures so far has been negative.  He was recently treated for ESBL E. coli.  He has completed antibiotics.  ID is following.      Patient's overall condition is about the same.  He continues to feel very weak.  He has not had any high-grade fever in the last 24 hours.  Some nausea.  No vomiting.  Appetite is decreased.      PHYSICAL EXAMINATION:   GENERAL:  He was alert. Looked very weak.   VITAL SIGNS:  Reviewed.    Rest of the systems not examined.      LABORATORY DATA:  Reviewed.      ASSESSMENT:    1.  A 89-year-old gentleman with multiple myeloma.   2.  Pancytopenia.   3.  Fever concerning for sepsis.   4.  Weakness.     PLAN:   1.  Overall, the patient's condition is stable.  Unlikely that he is going to improve significantly.   Given his age and overall condition, we are not going to give any more treatment for multiple myeloma.   2.  At this time we will continue him on supportive treatment for cytopenia.  Transfusion will be given as needed.  I would recommend transfusing to keep hemoglobin above 7.0 and platelet above 10.   3.  We will see him intermittently in the hospital. Discussed with Dr. Conley.         NEW CLEMENTS MD             D: 2018   T: 2018   MT: ALBERTO      Name:     JACOB MEDELLIN   MRN:      [id number]        Account:      [id number]   :      1926           Service Date: 2018      Document: [id number]

## 2018-05-27 NOTE — PLAN OF CARE
Problem: Patient Care Overview  Goal: Plan of Care/Patient Progress Review  Outcome: No Change  Pt A&0, hallucinations but pt aware. IV SL. VSS on RA. Started Seroquel for hallucinations gave 1x dose and available PRN. Lethargic this shift. Frequent loose stools. Up to chair for meals. Plan to d/c tomorrow. Will continue to monitor.

## 2018-05-27 NOTE — PROGRESS NOTES
SW:  I: following for d/c & placement.  D: SW spoke with [facility]. Pt previously stayed at [facility] Jan 23-feb 7 & March 27-March 30. Declined pt d/t acuity of care & needs, however may look over again via DON- SW to f/u on 5-28. SW called [facility], Left message for admissions on referral status. [facility] d/c 5-28.  P: SW to continue to follow.    JAYDA Villagomez

## 2018-05-27 NOTE — PLAN OF CARE
Problem: Patient Care Overview  Goal: Plan of Care/Patient Progress Review  OT: Attempted to see pt for OT treatment, pt agreeable to treatment but fell asleep 3X during initial discussion of plan and while preparing for bed mobility, pt unable to keep eyes open for session.

## 2018-05-27 NOTE — PLAN OF CARE
Problem: Patient Care Overview  Goal: Plan of Care/Patient Progress Review   A/O x 4, forgetful at times. Afebrile overnight.  Denies pain. Up with A1 to BSC, 2 stool overnight - incontinent. Voiding per urinal at bedside.  0200 B. Left PIV infusing NS at 75.  Ertapenem stopped, plan: continue to follow temps.  SW is following for DC plan.

## 2018-05-27 NOTE — PLAN OF CARE
Problem: Patient Care Overview  Goal: Plan of Care/Patient Progress Review  Discharge Planner PT   Patient plan for discharge: TCU  Current status: Pt requires modA of 1 for supine>sit with HOB elevated. Pt transfers sit>stand to FWW with modA of 1 on first rep from EOB, progressing to needing maxA of 1 after repetitions from EOB and from commode. Pt ambulated 25'x1 and 12'x1 with FWW and Cullen except modA needed x1 to prevent LOB with LLE buckling. Pt up in chair with chair alarm on upon PT exit. RN updated.  Barriers to return to prior living situation: Current level of assist, fall risk, weakness, impaired balance, unable to ambulate household distances.  Recommendations for discharge: TCU  Rationale for recommendations: Pt would benefit from continued skilled PT at TCU to progress functional strength, activity tolerance, safety and IND with functional mobility prior to return home.       Entered by: Kesha Jay 05/27/2018 1:51 PM

## 2018-05-28 NOTE — PLAN OF CARE
Problem: Patient Care Overview  Goal: Plan of Care/Patient Progress Review  Outcome: No Change  7810-4258:  Pt A&Ox4.  Forgetful at times.  Easily redirectable.  VSS on RA.  Afebrile.  Denies pain.  L/s diminished.  Up assist x1 w/ walker+GB.  +BS.  Voided, per urinal at bedside. Incontinent at times.  On Mod CHO diet.  .  No insulin coverage per SS. SW following for d/c plans.

## 2018-05-28 NOTE — PLAN OF CARE
Problem: Patient Care Overview  Goal: Plan of Care/Patient Progress Review  Outcome: No Change  Pt A&O x4. Having frequent hallucinations, Seroquel now scheduled. VSS. Up w/ strong 2 d/t increase weakness. No stools this shift. Speech consulted d/t inability to swallow, on DD3 diet now. Denies pain. Plan to stay 1-2 days pending improvement of hallucinations. Will continue to monitor.

## 2018-05-28 NOTE — PROGRESS NOTES
Amrita Progress Note  I: AMRITA followed up with calls to [facility] and [facility]. Voice message left.  SW spoke with Herminia at [facility] and she questioned pt's having hallucinations. Pt began having hallucinations yesterday and continues today to have them today so is he is not  ready for discharge.  Herminia has agreed to keep pt's paper work and pass along to Darlene (regular SW) but new referral needs to be sent to [facility] once pt's hallucinations have cleared and they will reassess.  P: AMRITA following for discharge planning.    JAYDA Rodriguez  [facility]  Phone: [phone]

## 2018-05-28 NOTE — PLAN OF CARE
Problem: Patient Care Overview  Goal: Plan of Care/Patient Progress Review  Outcome: No Change  RN 3:30pm-7:30pm. No acute changes. Encourage wakefulness during the day and minimize distractions overnight. Mod Cho diet, did not need SSI for dinner. Incontinent. Hallucinating today, seroquel given. Able to be reoriented easily. Up to chair for meals, A1 GB Walker.  Contact precautions maintained for ESBL in urine. Son at bedside.

## 2018-05-28 NOTE — PLAN OF CARE
Problem: Patient Care Overview  Goal: Plan of Care/Patient Progress Review  A/O x 2, lethargic, sleeping between cares, began hallucinating overnight, somewhat re-dierectable.  Seroquel given x 1. Denies pain.  Afebrile, satting low 90's on RA.  Blood sugar stable: 140 and 0200. Worsening rash on BLE, and L inner thigh.  Up with A2 to BCS or to stand and use urinal at bedside.  L. PIV saline locked. Anticipate DC to TCU when placement is found.

## 2018-05-28 NOTE — PROGRESS NOTES
"   05/28/18 1248   General Information   Onset Date 05/23/18   Start of Care Date 05/28/18   Referring Physician Guy Conley MD   Patient Profile Review/OT: Additional Occupational Profile Info See Profile for full history and prior level of function   Patient/Family Goals Statement comfort focused   Swallowing Evaluation Bedside swallow evaluation   Behaviorial Observations Alert;Confused  (appropriate with questions)   Mode of current nutrition Oral diet   Type of oral diet Regular;Thin liquid   Respiratory Status Room air   Comments Mr. Roc Parkinson is a 89 year old male with medical history including multiple myeloma, DM2, HTN, CHF, PAF, h/o prostate and bladder cancer, and h/o recurrent UTI's including recent ESBL producing producing E. Coli, who presented 5/23/2018 with weakness and found with fever and abnormal UA. Significant weakness and deconditioning. Per pt's son, pt may have \"weeks to live\" and reports plan to follow up with palliative care. SLP evaluation in 2018 recommending dysphagia diet level 3 and meds crushed as this is pt's baseline.   Clinical Swallow Evaluation   Oral Musculature generally intact   General Therapy Interventions   Planned Therapy Interventions Dysphagia Treatment   Dysphagia treatment Modified diet education;Instruction of safe swallow strategies   Swallow Eval: Clinical Impressions   Skilled Criteria for Therapy Intervention Skilled criteria met.  Treatment indicated.   Functional Assessment Scale (FAS) 5   Treatment Diagnosis Mild dysphagia   Diet texture recommendations Dysphagia diet level 3;Thin liquids   Recommended Feeding/Eating Techniques hard swallow w/ each bite or sip;maintain upright posture during/after eating for 30 mins;no straws   Therapy Frequency (1-2 sessions)   Predicted Duration of Therapy Intervention (days/wks) 1 week   Anticipated Discharge Disposition extended care facility   Risks and Benefits of Treatment have been explained. Yes " "  Patient, family and/or staff in agreement with Plan of Care Yes   Clinical Impression Comments SLP: Pt presents with baseline (per pt report and previous SLP evaluation) mild dysphagia secondary to weakness/deconditioning and reduced cognition. Pt reported difficulty swallowing pills and son reported that this has progressed with pt not likely meds crushed. Prolonged oral management with all PO intake. Throat clear on 1/5 trials of thin liquids via straw and moderate residue with small bite of regular textures. Pt verbalized agreement with recommended: downgrade to dysphagia diet level 3 and thin liquids (no straws). If possible, switch medications to liquid form or crush and place in lemonade/ice cream to mask the flavor. Lengthy discussion with pt's son outside of the room following eval. Son reported significant decline from baseline and wonders if pt will survive this hospitalization and be able to improve with rehab. Son reported long standing concern with nutrition/hydration and agreed that softer textures may be easier to chew/swallow and increase intake. Also, ordered magic cup supplement in between meals. Son agreed with ST recommendations and reported that if pt dislikes softer foods or wants pills whole, we can change the order and \"do the best we can\" with focus on comfort and overall quality of life.     Total Evaluation Time   Total Evaluation Time (Minutes) 14     "

## 2018-05-28 NOTE — PROGRESS NOTES
SPIRITUAL HEALTH SERVICES Progress Note  [facility]    Met with pt and his son, Alfredo, who was with him.  Pt was sitting up in a chair.    Pt was alert but clearly not oriented.  Pt's son (ascencion) said that pt has become increasingly confused during his days in the hospital, especially in the last couple of days.  Pt's son said that, for example, pt has been talking to/about his wife, who has been dead for 10 years.  Pt was picking at his line and would often speak/mumble things that made no sense to me.    Pt's three sons live in [place] ([place]), [place], and [place] ([place]    Pt's own  from [facility] in [place] has been to visit the pt. Pt's son said that pt isn't afraid of death, but has historically been anxious about the process of dying.    Provided conversation and support.   team will continue to follow.                                                                                                                                                 Indy Spirnger M.A.  Staff   Pager [phone]  Phone [phone]

## 2018-05-28 NOTE — PLAN OF CARE
Problem: Patient Care Overview  Goal: Plan of Care/Patient Progress Review  Discharge Planner SLP   Patient plan for discharge: Son reported TCU  Current status: SLP: Pt presents with baseline (per pt report and previous SLP evaluation) mild dysphagia secondary to weakness/deconditioning and reduced cognition. Pt reported difficulty swallowing pills and son reported that this has progressed with pt not likely meds crushed. Prolonged oral management with all PO intake. Throat clear on 1/5 trials of thin liquids via straw and moderate residue with small bite of regular textures. Pt verbalized agreement with recommended: downgrade to dysphagia diet level 3 and thin liquids (no straws). If possible, switch medications to liquid form or crush and place in lemonade/ice cream to mask the flavor. Lengthy discussion with pt s son outside of the room following eval. Son reported significant decline from baseline and wonders if pt will survive this hospitalization and be able to improve with rehab. Son reported long standing concern with nutrition/hydration and agreed that softer textures may be easier to chew/swallow and increase intake. Also, ordered magic cup supplement in between meals. Son agreed with ST recommendations and reported that if pt dislikes softer foods or wants pills whole, we can change the order and  do the best we can  with focus on comfort and overall quality of life.    Barriers to return to prior living situation: Deconditioning/weakness  Recommendations for discharge: TCU  Rationale for recommendations: ST to follow 1-2 sessions for tolerance and further education if needed       Entered by: Jasmin Rodas 05/28/2018 12:46 PM

## 2018-05-28 NOTE — PROGRESS NOTES
[facility]    Internal Medicine Hospitalist Progress Note  05/28/2018  I evaluated patient on the above date.    Guy Conley Jr., MD  [phone] (p)  Text Page      Assessment & Plan   Mr. Roc Parkinson is a 89 year old male with medical history including multiple myeloma, DM2, HTN, CHF, PAF, h/o prostate and bladder cancer, and h/o recurrent UTI's including recent ESBL producing producing E. Coli, who presented 5/23/2018 with weakness and found with fever and abnormal UA.     Fevers with weakness with concern for sepsis - ?due to chemo (Revlimid).  Presented 5/23 with generalized weakness and fever of 103.1; WBC low but not neutropenic; initial lactic acid of 2.6; UA mildly abnormal. CXR was negative for acute infiltrate. Hx of recurrent UTI with recent ESBL E. Coli and had completed a course of IV Ertapenem on 5/20/18 and was thus started empirically on ertapenem on 5/23. UC from 5/23 was ultimately negative. Given 1 dose vanco 5/25. Procalcitonin 0.84 (moderate risk) on 5/25. BC's so far negative as of 5/26. Of note, pt recently started on Revlimid (lenalidomide) on 5/21 which can have adverse reaction of fever. Seen by Oncology 5/24 and chemo held. CT CAP on 5/25 negative for signs of focal infection. Afebrile since am 5/25. Ertapenem stopped 5/26.  Micro: BC's 5/23 NGTD. UC 5/23 negative. BC 5/25 NGTD. C. Diff toxin B 5/25 negative.  - Monitor clinically for signs of focal infection; and if so, unless UTI suspected, favor a different antibiotic than a carbepenem.  - Monitor cultures.  - Appreciate help from Dr. Corado.    AMS with hallucinations, suspect acute delirium vs toxic encephalopathy.  Pt with hallucinations 5/27 but pt aware of them. Son 5/27 noted that he had been having hallucinations since PTA but worse on 5/27. ?side effect from ertapenem (stopped 5/26). Added PRN Seroquel 5/27. Still hallucinating/delirious 5/28.  - Scheduled Seroquel 25 mg BID.  - Continue PRN Seroquel  12.5 mg BID.     Multiple myeloma/myelodysplastic syndrome.   Pancytopenia suspect due to above and chemotherapy.  He is being followed by Dr. Quinn of Hematology/Oncology. He has been on multiple chemotherapy regimens. As per the last notes, he was recently started on Revlimid daily on 5/21 and planned for 21-day course, then 7 days off, and he also was taking dexamethasone weekly. Seen by Oncology 5/24 and Revlimid and dexamethasone held.   Recent Labs  Lab 05/28/18  0730 05/27/18  0628 05/26/18  0710 05/25/18  0755 05/25/18  0705 05/24/18  1838 05/24/18  0701   HGB 8.0* 7.9* 8.1* 8.4* Canceled, Test credited 8.1* 8.1*   WBC 2.5* 3.1* 3.4* 2.7* Canceled, Test credited  --  2.7*   PLT 43* 38* 42* 46* Canceled, Test credited  --  48*   - After further d/w Dr. Villaseñor 5/26, no plans for further treatment.  - Appreciate help from  Oncology, Dr. Villaseñor.  - Monitor CBC.  - Defer to Oncology regarding transfusions: consider prbc transfusion if hgb </= 7.0 or if significant bleeding with hemodynamic instability or if symptomatic; and consider platelet transfusion if needs a procedure and less than 50,000; or if less than 10,000.     Acute kidney injury stage 1 on chronic kidney disease, suspect prerenal.  His baseline creatinine seems to be between 0.9-1.2. His creatinine was 1.48 on admission.   Recent Labs  Lab 05/28/18  0730 05/27/18  0628 05/26/18  0710 05/25/18  0705 05/24/18  0701 05/23/18  2140   CR 0.94 1.08 1.24 1.38* 1.24 1.48*   - Holding PTA Lasix and lisinopril for now.  - Monitor BMP.  - Avoid nephrotoxic medications.    Weakness and deconditioning.  Pt extremely weak.  - Treat above issues.  - Continue PT and OT.  - Plan TCU.    DM2 w/o complications.  [PTA: Levemir 17 units subcutaneously every morning; insulin aspart 13U every morning, 11U with lunch, 9U with dinner, 5U at HS.]  His most recent hemoglobin A1c was 5.5 in March 2018. Glucose trending low on 5/25 evening. Levemir and prandial aspart held  5/26.  Recent Labs   Lab Test  05/28/18   0730  05/28/18   0721  05/28/18   0138  05/27/18   2119  05/27/18   1723  05/27/18   1151   05/27/18   0628   05/26/18   0710   05/25/18   0705   05/24/18   0701   GLC  120*   --    --    --    --    --    --   98   --   74   --   103*   --   137*   BGM   --   113*  140*  185*  133*  127*   < >   --    < >   --    < >   --    < >   --     < > = values in this interval not displayed.   - Continue off Levemir and prandial aspart.  - Continue ISS, low.     Hypertension (benign essential).  History of diastolic CHF with preserved ejection fraction of 55-60%.   [PTA: amlodipine 10 mg daily; furosemide 20 mg daily; lisinopril 15 mg daily; metoprolol XL 50 mg daily.]  - Continue amlodipine, metoprolol.  - Holding furosemide and lisinopril due to fluctuating renal function and weakness.  - Continue PRN IV hydralazine.  - Monitor i/o's, daily wts.     Dyslipidemia.    - Holding Lopid for now given weakness.     Hypothyroidism.    His most recent TSH on 4/25/2018 was high at 10.08, although free T4 was normal and seems like levothyroxine dose was adjusted then by PCP. TSH 5/24 normal.  - Continue current dose of levothyroxine.     Paroxysmal atrial fibrillation.    His anticoagulation was discontinued 1 year ago due to GI bleeding.    - Continue ASA and metoprolol.    Prophylaxis.  - PCD's, ambulation.    CODE STATUS: DNR/DNI.    Dispo.  - Pending above.  - Possibly TCU 1-2d if remains afebrile and mentation improved.    # Pain Assessment:  Current Pain Score 5/28/2018   Patient currently in pain? denies   Pain score (0-10) -   Pain location -   Pain descriptors -   Roc persaud pain level was assessed and he currently denies pain.      Communication.  - I d/w pt's son 5/28.      Interval History   Continues to be delirious at times.  This am pt delirious (thinks his car is being worked on outside) but able to be redirected. Knows he is in the hospital.    -Data reviewed today: I  "reviewed all new labs and imaging over the last 24 hours. I personally reviewed no images or EKG's today.    Physical Exam   Heart Rate: 71, Blood pressure 122/58, temperature 98.1  F (36.7  C), temperature source Oral, resp. rate 16, height 1.778 m (5' 10\"), weight 94.1 kg (207 lb 6.4 oz), SpO2 95 %.  Vitals:    05/25/18 0505 05/26/18 0551 05/28/18 0613   Weight: 92.2 kg (203 lb 4.8 oz) 96.7 kg (213 lb 1.6 oz) 94.1 kg (207 lb 6.4 oz)     Vital Signs with Ranges  Temp:  [96.7  F (35.9  C)-98.5  F (36.9  C)] 98.1  F (36.7  C)  Heart Rate:  [65-72] 71  Resp:  [16-18] 16  BP: (122-140)/(58-67) 122/58  SpO2:  [91 %-95 %] 95 %  Patient Vitals for the past 24 hrs:   BP Temp Temp src Heart Rate Resp SpO2 Weight   05/28/18 0707 122/58 98.1  F (36.7  C) Oral 71 16 95 % -   05/28/18 0613 - - - - - - 94.1 kg (207 lb 6.4 oz)   05/27/18 2350 137/67 98.5  F (36.9  C) Oral 72 16 93 % -   05/27/18 1950 135/63 98.5  F (36.9  C) Oral 67 16 93 % -   05/27/18 1617 137/61 96.7  F (35.9  C) Axillary 67 18 92 % -   05/27/18 1110 140/65 98.1  F (36.7  C) Oral 65 17 91 % -     I/O's Last 24 hours  I/O last 3 completed shifts:  In: 240 [P.O.:240]  Out: 995 [Urine:995]    Constitutional: Alert, delirious, follows commands.  Respiratory: Diminished in bases. No crackles or wheezes.  Cardiovascular: RRR no m/r/g.  GI: Soft, nt, nd, +BS.  Skin/Integumen:   Other:        Data     Recent Labs  Lab 05/28/18  0730 05/27/18  0628 05/26/18  0710  05/24/18  0701   WBC 2.5* 3.1* 3.4*  < > 2.7*   HGB 8.0* 7.9* 8.1*  < > 8.1*   * 109* 108*  < > 109*   PLT 43* 38* 42*  < > 48*    141 139  < > 139   POTASSIUM 3.8 3.8 3.8  < > 3.7   CHLORIDE 116* 115* 113*  < > 109   CO2 17* 18* 18*  < > 21   BUN 14 18 25  < > 31*   CR 0.94 1.08 1.24  < > 1.24   ANIONGAP 9 8 8  < > 9   MICHELLE 8.3* 7.7* 8.0*  < > 8.5   * 98 74  < > 137*   ALBUMIN  --   --   --   --  2.0*   PROTTOTAL  --   --   --   --  10.0*   BILITOTAL  --   --   --   --  0.2   ALKPHOS  --   " --   --   --  62   ALT  --   --   --   --  19   AST  --   --   --   --  21   < > = values in this interval not displayed.  Recent Labs   Lab Test  05/28/18   0730  05/28/18   0721  05/28/18   0138  05/27/18   2119  05/27/18   1723  05/27/18   1151   05/27/18   0628   05/26/18   0710   05/25/18   0705   05/24/18   0701   GLC  120*   --    --    --    --    --    --   98   --   74   --   103*   --   137*   BGM   --   113*  140*  185*  133*  127*   < >   --    < >   --    < >   --    < >   --     < > = values in this interval not displayed.         No results found for this or any previous visit (from the past 24 hour(s)).    Medications   All medications were reviewed.      acyclovir (ZOVIRAX) capsule 400 mg  400 mg Oral BID     amLODIPine  10 mg Oral Daily     aspirin  325 mg Oral Daily     cholecalciferol  1,000 Units Oral Daily     ferrous sulfate  325 mg Oral Daily with breakfast     insulin aspart  1-3 Units Subcutaneous TID AC     insulin aspart  1-3 Units Subcutaneous At Bedtime     levothyroxine (SYNTHROID/LEVOTHROID) tablet 25 mcg  25 mcg Oral Once per day on Sun Tue Wed Thu Sat     levothyroxine (SYNTHROID/LEVOTHROID) tablet 50 mcg  50 mcg Oral Once per day on Mon Fri     metoprolol succinate  50 mg Oral Daily     sodium chloride (PF)  3 mL Intracatheter Q8H

## 2018-05-29 NOTE — PROGRESS NOTES
Called by nursing staff regarding urinalysis with positive leukocyte esterase, however, also with 2 squamous epithelial cells. Empiric coverage with Rocephin at this time, await urine culture results.

## 2018-05-29 NOTE — PLAN OF CARE
Problem: Patient Care Overview  Goal: Plan of Care/Patient Progress Review  Physical Therapy Discharge Summary    Reason for therapy discharge:    Discharged to PT no longer appropriate    Progress towards therapy goal(s). See goals on Care Plan in [facility] electronic health record for goal details.  Goals not met.  Barriers to achieving goals:   limited tolerance for therapy.    Therapy recommendation(s):    No further therapy is recommended.

## 2018-05-29 NOTE — PLAN OF CARE
Problem: Patient Care Overview  Goal: Plan of Care/Patient Progress Review  SLP: Per chart review, pt is now comfort cares with plans to d/c to hospice. MD to liberalize diet as appropriate.     Speech Language Therapy Discharge Summary    Reason for therapy discharge:    Discharged to home with hospice and comfort cares    Progress towards therapy goal(s). See goals on Care Plan in Epic electronic health record for goal details.  Goals not met.  Barriers to achieving goals:   limited tolerance for therapy.    Therapy recommendation(s):    No further therapy is recommended.

## 2018-05-29 NOTE — PROGRESS NOTES
[facility]    Infectious Disease Progress Note    Date of Service (when I saw the patient): 05/29/2018     Assessment & Plan   Roc Parkinson is a 89 year old male who was admitted on 5/23/2018.     ASSESSMENT:  1. FEVERS-better, was on ertapenem and recently completed ertapenem course for ESBL + UTI, now off antibiotics, Cxs negative, fever better  2. MYELOMA-Has been on Revlimid  3. RECENT ESBL + E.COLI UTI     PLAN  1. Follow off antibiotics  2. F/u temps  3. Reassess, I saw the UA from yesterday, no antibiotics for now, I stopped ceftriaxone.     Lor Cooper MD    Interval History   Remains afebrile   Repeat UA is better than the one done on admission which had grown no organisms.     Physical Exam   Temp: 99.2  F (37.3  C) Temp src: Oral BP: 128/56 Pulse: 83 Heart Rate: 75 Resp: 16 SpO2: 93 % O2 Device: None (Room air)    Vitals:    05/26/18 0551 05/28/18 0613 05/29/18 0656   Weight: 96.7 kg (213 lb 1.6 oz) 94.1 kg (207 lb 6.4 oz) 93.9 kg (207 lb 0.2 oz)     Vital Signs with Ranges  Temp:  [96.2  F (35.7  C)-99.2  F (37.3  C)] 99.2  F (37.3  C)  Pulse:  [83] 83  Heart Rate:  [71-75] 75  Resp:  [16-18] 16  BP: (128-163)/(56-71) 128/56  SpO2:  [93 %-96 %] 93 %    Constitutional: Awake, alert, cooperative, no apparent distress  Lungs: Clear to auscultation bilaterally, no crackles or wheezing  Cardiovascular: Regular rate and rhythm, normal S1 and S2, and no murmur noted  Abdomen: Normal bowel sounds, soft, non-distended, non-tender  Skin: No rashes, no cyanosis, no edema  Other:    Medications       acyclovir (ZOVIRAX) capsule 400 mg  400 mg Oral BID     amLODIPine  10 mg Oral Daily     aspirin  325 mg Oral Daily     cholecalciferol  1,000 Units Oral Daily     ferrous sulfate  325 mg Oral Daily with breakfast     insulin aspart  1-3 Units Subcutaneous TID AC     insulin aspart  1-3 Units Subcutaneous At Bedtime     levothyroxine (SYNTHROID/LEVOTHROID) tablet 25 mcg  25 mcg Oral Once per day  on Sun Tue Wed Thu Sat     levothyroxine (SYNTHROID/LEVOTHROID) tablet 50 mcg  50 mcg Oral Once per day on Mon Fri     metoprolol succinate  50 mg Oral Daily     QUEtiapine  25 mg Oral BID     sodium chloride (PF)  3 mL Intracatheter Q8H       Data   All microbiology laboratory data reviewed.  Recent Labs   Lab Test  05/29/18   0755  05/28/18   0730  05/27/18   0628   WBC  2.4*  2.5*  3.1*   HGB  7.9*  8.0*  7.9*   HCT  24.2*  24.7*  24.7*   MCV  109*  108*  109*   PLT  41*  43*  38*     Recent Labs   Lab Test  05/29/18   0755  05/28/18   0730  05/27/18   0628   CR  1.02  0.94  1.08     Recent Labs   Lab Test  11/17/16   0938   SED  126*     Recent Labs   Lab Test  05/28/18   1830  05/25/18   0800  05/25/18   0755  05/23/18   2215  05/23/18   2140  05/22/18   1249  05/07/18   0956  01/20/18   0439  01/20/18   0424   CULT  PENDING  No growth after 4 days  No growth after 4 days  No growth after 5 days  No growth  No growth after 5 days  No growth  >100,000 colonies/mL  Escherichia coli ESBL  *  >100,000 colonies/mL  Strain 2  Escherichia coli ESBL  *  ESBL (extended beta lactamase) producing organisms require contact precautions.  Enterobacteriaceae that are susceptible to meropenem are usually susceptible to ertapenem.  No growth  No growth

## 2018-05-29 NOTE — PLAN OF CARE
Problem: Urinary Tract Infection (Adult)  Goal: Signs and Symptoms of Listed Potential Problems Will be Absent, Minimized or Managed (Urinary Tract Infection)  Signs and symptoms of listed potential problems will be absent, minimized or managed by discharge/transition of care (reference Urinary Tract Infection (Adult) CPG).   Outcome: No Change  Hallucinating frequently, disoriented, but able to be reoriented fairly easily. VSS, slightly hypertensive. Incontinent of urine at times, otherwise uses the urinal. Groin rash present, miconazole powder ordered and placed in room. Strong A2 and walker/gb to chair. Mod cho diet, DDL3 with thin liquids, no straws. Crush meds. Contact precautions maintained for ESBL in urine. UA/UC sent, MD notified of results, abx started. Scheduled seroquel given at bedtime. BGM checked, 1u insulin needed at bedtime. Plan to discharge to TCU when stable.

## 2018-05-29 NOTE — PLAN OF CARE
Problem: Patient Care Overview  Goal: Plan of Care/Patient Progress Review  OT: Attempted to see patient for therapy, however patient was eating breakfast at the time.

## 2018-05-29 NOTE — PROGRESS NOTES
[facility]    Palliative Care Progress Note    Roc Parkinson  MRN# [id number]  Date of Admission:  2018  Date of Service (when I saw the patient): 2018    Assessment & Plan   Roc Parkinson is a 89 year old male with PMH significant for HTN, HLD, CAD, HF with preserved EF, paroxysmal a.fib on aspirin, DM2, GERD, recent history recurrent UTIs, remote history prostate and bladder CA, MDS, and refractory multiple myeloma recently on therapy with revlamid/dexamethasone, who presents with weakness and fever. Source of infection has been unclear, ID following. He developed delirium over the weekend. Pt has been hospitalized 4 times in the last 6 months, he has overall decline and failure to thrive. We are following for goals of care, per family request.    Symptoms/Recommendations  -Transition to comfort measures only, ok to stop checking labs, VS, no further escalation of care   -Continue IV ceftriaxone for empiric UTI coverage; family understands that IV abx do not continue with hospice care after discharge. Continue conversation about whether the abx are helping his comfort/care or not   -Continue insulin support for comfort; when it becomes clear that he is not eating and risk of hypoglycemia increases, ok to consider stopping insulin support   -Dilaudid 2-4mg SL every 2hrs PRN pain, SOB, dyspnea, distress   -Haldol 1-2mg SL every 6hrs NE agitation, delirium, hallucinations, nausea   -PRN bowel regimen   -Atropine, robinul PRN secretions   -SW consult for hospice; will need placement. Considering LTC with possible need for MA application vs consider [facility]   -Family accepting of [facility]     Support/Coping  -Wife  10 years ago, sounds like she was on life support   -3 adult sons all live out of state; Lev is present and supportive today (he lives in [place]). Son Bam lives in [place] and son Erick lives in [place]   -Pt has a twin brother here in the  [facility]. He also has a sister who is 100 years old   -Mackenzie and spirituality is very important to Roc; appreciate support of Palliative , Saira Springer     Decisional Support, Goals of Care, Counseling & Coordination  Decisional Capacity Intact?  -No  Health Care Directive on File?  -POLST on file, reviewed. Apparently he does have a health care directive, but family lost it. It was similar to their mother's, which they have access to and are referencing. We do not have a HCD on file   Code Status/Resuscitation Preferences?  -DNR/DNI     Discussion  Visited with Roc and son Alfredo this AM. Roc is much more lethargic today and less interactive. He is barely able to open his eyes. He does not recognize me. He is able to answer some questions seemingly appropriately, like in regard to comfort level. Otherwise, his responses to questions are fairly nonsensical and unrelated.     Visited with peggy Fuentes in private, along with pt's other son Bam via phone and Bam's wife Kelly. Family is all very supportive of each other and working closely together to develop a good plan for Roc.     Together we reviewed pt's current health issues and weekend events. I expressed concern to family today that we seem to be trading one problem for another, and unfortunately, I do not see that cycle breaking. We talk about the lack of improvement this weekend, and even the further decline. I worry that Roc's wish of walking again is likely not very realistic at this point. We talk about the hospital delirium and the likely multifactorial causes. We discuss that delirium can in fact be a terminal symptom at end of life. Family was very understanding in exploring Roc's current health concerns, largely being a complication of the progressing myeloma. Expressed concern that time very may well be limited to weeks at this point (family asked for this information in determining coming into town and logistics).     We thus  "talk about the option of TCU vs hospice. Family is interested in learning more about hospice. Educated family regarding hospice philosophy and prognostic criteria. Dispelled common myths. Discussed what hospice is (and is not), what services are usually provided (and those that are not, ex \"USP care\"), under what circumstances people tend to enroll, and the variety of places people can get hospice care (along with subsequent financial implications). Discussed typical anticipated timing of discharge. Based on this discussion, family would like to initiate a comfort plan of care and hospice discharge plan. They are concerned about finances and would thus like to explore payment options with SW. Did offer [facility] as an option.     Case reviewed with bedside RN Juana, unit SW Ebony, and Dr. Conley.     Thank you for involving us in the care of this patient and family. We will continue to follow. Please do not hesitate to contact me with questions or concerns or the on-call provider for our team if evening or weekend.    Adriana MUNZO, [facility]  Palliative Medicine   Pager [phone]    Attestation:  Total time on the floor involved in the patient's care: 60 minutes  Total time spent in counseling/care coordination: >50%    Interval History   Pt became delirious and confused over the weekend. He seemingly defervesced and abx were stopped, but now with low grade fever again and UA concerning for UTI. ID continues to follow. Family continues to be concerned for Roc's overall health.     Medications   Current Facility-Administered Medications Ordered in Epic   Medication Dose Route Frequency Last Rate Last Dose     acetaminophen (TYLENOL) Suppository 650 mg  650 mg Rectal Q4H PRN         acetaminophen (TYLENOL) tablet 650 mg  650 mg Oral Q4H PRN   650 mg at 05/25/18 0730     atropine 1 % ophthalmic solution 1-2 drop  1-2 drop Sublingual Q1H PRN         [START ON 6/1/2018] bisacodyl (DULCOLAX) Suppository 10 " mg  10 mg Rectal Once PRN         cefTRIAXone (ROCEPHIN) 1 g vial to attach to  mL bag for ADULTS or NS 50 mL bag for PEDS  1 g Intravenous Q24H         glucose gel 15-30 g  15-30 g Oral Q15 Min PRN        Or     dextrose 50 % injection 25-50 mL  25-50 mL Intravenous Q15 Min PRN        Or     glucagon injection 1 mg  1 mg Subcutaneous Q15 Min PRN         glycopyrrolate (ROBINUL) injection 0.2-0.4 mg  0.2-0.4 mg Intravenous Q4H PRN         haloperidol (HALDOL) 2 MG/ML (HIGH CONC) solution 1-2 mg  1-2 mg Oral Q6H PRN         HYDROmorphone (DILAUDID) (HIGH CONC) oral solution 2-4 mg  2-4 mg Sublingual Q2H PRN         insulin aspart (NovoLOG) inj (RAPID ACTING)  1-3 Units Subcutaneous TID AC   1 Units at 05/29/18 0843     insulin aspart (NovoLOG) inj (RAPID ACTING)  1-3 Units Subcutaneous At Bedtime         levothyroxine (SYNTHROID/LEVOTHROID) tablet 25 mcg  25 mcg Oral Once per day on Sun Tue Wed Thu Sat   25 mcg at 05/29/18 1036     levothyroxine (SYNTHROID/LEVOTHROID) tablet 50 mcg  50 mcg Oral Once per day on Mon Fri   50 mcg at 05/28/18 0829     lidocaine (LMX4) cream   Topical Q1H PRN         lidocaine 1 % 1 mL  1 mL Other Q1H PRN         magnesium hydroxide (MILK OF MAGNESIA) suspension 30 mL  30 mL Oral Daily PRN         melatonin tablet 1 mg  1 mg Oral At Bedtime PRN         miconazole (MICATIN; MICRO GUARD) 2 % powder   Topical Q1H PRN         naloxone (NARCAN) injection 0.1-0.4 mg  0.1-0.4 mg Intravenous Q2 Min PRN         ondansetron (ZOFRAN-ODT) ODT tab 4 mg  4 mg Oral Q6H PRN        Or     ondansetron (ZOFRAN) injection 4 mg  4 mg Intravenous Q6H PRN   4 mg at 05/25/18 1056     polyethylene glycol (MIRALAX/GLYCOLAX) Packet 17 g  17 g Oral BID PRN   17 g at 05/24/18 1448     QUEtiapine (SEROquel) tablet 25 mg  25 mg Oral BID   25 mg at 05/29/18 0840     senna-docusate (SENOKOT-S;PERICOLACE) 8.6-50 MG per tablet 1 tablet  1 tablet Oral BID PRN   1 tablet at 05/24/18 1300    Or     senna-docusate  (SENOKOT-S;PERICOLACE) 8.6-50 MG per tablet 2 tablet  2 tablet Oral BID PRN         sodium chloride (PF) 0.9% PF flush 3 mL  3 mL Intracatheter Q1H PRN         sodium chloride (PF) 0.9% PF flush 3 mL  3 mL Intracatheter Q8H   3 mL at 05/28/18 0829     Current Outpatient Prescriptions Ordered in Epic   Medication     metoprolol succinate (TOPROL-XL) 50 MG 24 hr tablet     [DISCONTINUED] cyclophosphamide (CYTOXAN) 50 MG capsule CHEMO       Physical Exam   Temp: 99.2  F (37.3  C) Temp src: Oral BP: 128/56 Pulse: 83 Heart Rate: 75 Resp: 16 SpO2: 93 % O2 Device: None (Room air)    Vitals:    05/26/18 0551 05/28/18 0613 05/29/18 0656   Weight: 96.7 kg (213 lb 1.6 oz) 94.1 kg (207 lb 6.4 oz) 93.9 kg (207 lb 0.2 oz)     CONSTITUTIONAL: Chronically ill elderly man seen lying in bed in NAD, lethargic and disoriented. He appears calm and comfortable.  HEENT: NCAT, dry mucous membranes   RESPIRATORY: NL respiratory effort on RA  NEUROLOGIC: Lethargic, able to respond to some questions seemingly appropriately, but primarily disoriented with nonsensical responses     Data   Results for orders placed or performed during the hospital encounter of 05/23/18 (from the past 24 hour(s))   Glucose by meter   Result Value Ref Range    Glucose 186 (H) 70 - 99 mg/dL   UA reflex to Microscopic   Result Value Ref Range    Color Urine Yellow     Appearance Urine Clear     Glucose Urine Negative NEG^Negative mg/dL    Bilirubin Urine Negative NEG^Negative    Ketones Urine Negative NEG^Negative mg/dL    Specific Gravity Urine 1.015 1.003 - 1.035    Blood Urine Trace (A) NEG^Negative    pH Urine 6.0 5.0 - 7.0 pH    Protein Albumin Urine 100 (A) NEG^Negative mg/dL    Urobilinogen mg/dL Normal 0.0 - 2.0 mg/dL    Nitrite Urine Negative NEG^Negative    Leukocyte Esterase Urine Moderate (A) NEG^Negative    Source Clean catch urine     RBC Urine 2 0 - 2 /HPF    WBC Urine 16 (H) 0 - 5 /HPF    Squamous Epithelial /HPF Urine 2 (H) 0 - 1 /HPF    Mucous  Urine Present (A) NEG^Negative /LPF   Urine Culture Aerobic Bacterial   Result Value Ref Range    Specimen Description Midstream Urine     Special Requests Specimen received in preservative     Culture Micro PENDING    Glucose by meter   Result Value Ref Range    Glucose 161 (H) 70 - 99 mg/dL   Glucose by meter   Result Value Ref Range    Glucose 129 (H) 70 - 99 mg/dL   Glucose by meter   Result Value Ref Range    Glucose 142 (H) 70 - 99 mg/dL   Creatinine   Result Value Ref Range    Creatinine 1.02 0.66 - 1.25 mg/dL    GFR Estimate 68 >60 mL/min/1.7m2    GFR Estimate If Black 83 >60 mL/min/1.7m2   CBC with platelets differential   Result Value Ref Range    WBC 2.4 (L) 4.0 - 11.0 10e9/L    RBC Count 2.23 (L) 4.4 - 5.9 10e12/L    Hemoglobin 7.9 (L) 13.3 - 17.7 g/dL    Hematocrit 24.2 (L) 40.0 - 53.0 %     (H) 78 - 100 fl    MCH 35.4 (H) 26.5 - 33.0 pg    MCHC 32.6 31.5 - 36.5 g/dL    RDW 21.6 (H) 10.0 - 15.0 %    Platelet Count 41 (LL) 150 - 450 10e9/L    Diff Method Automated Method     % Neutrophils 64.5 %    % Lymphocytes 26.7 %    % Monocytes 7.6 %    % Eosinophils 0.4 %    % Basophils 0.0 %    % Immature Granulocytes 0.8 %    Nucleated RBCs 1 (H) 0 /100    Absolute Neutrophil 1.5 (L) 1.6 - 8.3 10e9/L    Absolute Lymphocytes 0.6 (L) 0.8 - 5.3 10e9/L    Absolute Monocytes 0.2 0.0 - 1.3 10e9/L    Absolute Eosinophils 0.0 0.0 - 0.7 10e9/L    Absolute Basophils 0.0 0.0 - 0.2 10e9/L    Abs Immature Granulocytes 0.0 0 - 0.4 10e9/L    Absolute Nucleated RBC 0.0

## 2018-05-29 NOTE — PROGRESS NOTES
[facility]    Internal Medicine Hospitalist Progress Note  05/29/2018  I evaluated patient on the above date.    Guy Conley Jr., MD  [phone] (p)  Text Page      Assessment & Plan   ASSESSMENT:  Mr. Roc Parkinson is a 89 year old male with medical history including multiple myeloma, DM2, HTN, CHF, PAF, h/o prostate and bladder cancer, and h/o recurrent UTI's including recent ESBL producing producing E. Coli, who presented 5/23/2018 with weakness and found with fever and abnormal UA.     1. Fevers with weakness with concern for sepsis - suspect due to chemo (Revlimid).  Presented 5/23 with generalized weakness and fever of 103.1; WBC low but not neutropenic; initial lactic acid of 2.6; UA mildly abnormal. CXR was negative for acute infiltrate. Hx of recurrent UTI with recent ESBL E. Coli and had completed a course of IV Ertapenem on 5/20/18 and was thus started empirically on ertapenem on 5/23. UC from 5/23 was ultimately negative. Given 1 dose vanco 5/25. Procalcitonin 0.84 (moderate risk) on 5/25. BC's so far negative as of 5/26. Of note, pt recently started on Revlimid (lenalidomide) on 5/21 which can have adverse reaction of fever. Seen by Oncology 5/24 and chemo held. CT CAP on 5/25 negative for signs of focal infection. Afebrile since am 5/25. Ertapenem stopped 5/26. Repeat UA 5/2 abnormal, but with 2 sq epithelial cells. Micro: BC's 5/23 NGTD. UC 5/23 negative. BC 5/25 NGTD. C. Diff toxin B 5/25 negative.    2. AMS with hallucinations, suspect acute delirium vs toxic encephalopathy.  Pt with hallucinations 5/27 but pt aware of them. Son 5/27 noted that he had been having hallucinations since PTA but worse on 5/27. ?side effect from ertapenem (stopped 5/26). Added PRN Seroquel 5/27. Still hallucinating/delirious 5/28. Scheduled Seroquel 5/28. UA 5/28 not impressive for infection.     3. Multiple myeloma/myelodysplastic syndrome.       Pancytopenia suspect due to above and  "chemotherapy.  He is being followed by Dr. Quinn of Hematology/Oncology. He has been on multiple chemotherapy regimens. As per the last notes, he was recently started on Revlimid daily on 5/21 and planned for 21-day course, then 7 days off, and he also was taking dexamethasone weekly. Seen by Oncology 5/24 and Revlimid and dexamethasone held. After further d/w Dr. Villaseñor 5/26, no plans for further treatment.     4. Acute kidney injury stage 1 on chronic kidney disease, suspect prerenal.  His baseline creatinine seems to be between 0.9-1.2. His creatinine was 1.48 on admission. Cr normalized 5/26.     5. Weakness and deconditioning due to above.    6. DM2 w/o complications.  [PTA: Levemir 17 units subcutaneously every morning; insulin aspart 13U every morning, 11U with lunch, 9U with dinner, 5U at HS.]  His most recent hemoglobin A1c was 5.5 in March 2018. Glucose trending low on 5/25 evening. Levemir and prandial aspart held 5/26. Glucose levels stable despite being off long acting insulin.    OVERALL, after further d/w Oncology and Palliative Care team, pt was transitioned to comfort cares on 5/29.       OTHER CHRONIC ISSUES:  Hypertension (benign essential).   History of diastolic CHF with preserved ejection fraction of 55-60%.   Dyslipidemia.    Hypothyroidism.    Paroxysmal atrial fibrillation.      CODE STATUS: DNR/DNI/comfort cares.    PLAN:  - Continue comfort measures.  - Family wanted to continue ISS.  - Plan discharge to hospice facility or NH with hospice.    Interval History   Pt confused but pleasant.    -Data reviewed today: I reviewed all new labs and imaging over the last 24 hours. I personally reviewed no images or EKG's today.    Physical Exam   Heart Rate: 75, Blood pressure 128/56, pulse 83, temperature 99.2  F (37.3  C), temperature source Oral, resp. rate 16, height 1.778 m (5' 10\"), weight 93.9 kg (207 lb 0.2 oz), SpO2 93 %.  Vitals:    05/26/18 0551 05/28/18 0613 05/29/18 0656   Weight: 96.7 kg " (213 lb 1.6 oz) 94.1 kg (207 lb 6.4 oz) 93.9 kg (207 lb 0.2 oz)     Vital Signs with Ranges  Temp:  [96.2  F (35.7  C)-99.2  F (37.3  C)] 99.2  F (37.3  C)  Pulse:  [83] 83  Heart Rate:  [71-75] 75  Resp:  [16-18] 16  BP: (128-163)/(56-71) 128/56  SpO2:  [93 %-96 %] 93 %  Patient Vitals for the past 24 hrs:   BP Temp Temp src Pulse Heart Rate Resp SpO2 Weight   05/29/18 0732 128/56 99.2  F (37.3  C) Oral 83 - 16 93 % -   05/29/18 0656 - - - - - - - 93.9 kg (207 lb 0.2 oz)   05/29/18 0111 163/70 97.8  F (36.6  C) Oral - 75 18 95 % -   05/28/18 1905 160/71 97.9  F (36.6  C) Axillary - 74 18 96 % -   05/28/18 1523 150/66 96.2  F (35.7  C) Oral - 71 18 95 % -     I/O's Last 24 hours  I/O last 3 completed shifts:  In: 774 [P.O.:100; I.V.:674]  Out: 725 [Urine:725]    Constitutional: Pleasantly confused.  Respiratory:   Cardiovascular:   GI:   Skin/Integumen:   Other:        Data     Recent Labs  Lab 05/29/18  0755 05/28/18  0730 05/27/18  0628 05/26/18  0710  05/24/18  0701   WBC 2.4* 2.5* 3.1* 3.4*  < > 2.7*   HGB 7.9* 8.0* 7.9* 8.1*  < > 8.1*   * 108* 109* 108*  < > 109*   PLT 41* 43* 38* 42*  < > 48*   NA  --  142 141 139  < > 139   POTASSIUM  --  3.8 3.8 3.8  < > 3.7   CHLORIDE  --  116* 115* 113*  < > 109   CO2  --  17* 18* 18*  < > 21   BUN  --  14 18 25  < > 31*   CR 1.02 0.94 1.08 1.24  < > 1.24   ANIONGAP  --  9 8 8  < > 9   MICHELLE  --  8.3* 7.7* 8.0*  < > 8.5   GLC  --  120* 98 74  < > 137*   ALBUMIN  --   --   --   --   --  2.0*   PROTTOTAL  --   --   --   --   --  10.0*   BILITOTAL  --   --   --   --   --  0.2   ALKPHOS  --   --   --   --   --  62   ALT  --   --   --   --   --  19   AST  --   --   --   --   --  21   < > = values in this interval not displayed.  Recent Labs   Lab Test  05/29/18   1148  05/29/18   0754  05/29/18   0502  05/28/18   2138  05/28/18   1644   05/28/18   0730   05/27/18   0628   05/26/18   0710   05/25/18   0705   05/24/18   0701   GLC   --    --    --    --    --    --   120*    --   98   --   74   --   103*   --   137*   BGM  155*  142*  129*  161*  186*   < >   --    < >   --    < >   --    < >   --    < >   --     < > = values in this interval not displayed.         No results found for this or any previous visit (from the past 24 hour(s)).    Medications   All medications were reviewed.      cefTRIAXone  1 g Intravenous Q24H     insulin aspart  1-3 Units Subcutaneous TID AC     insulin aspart  1-3 Units Subcutaneous At Bedtime     levothyroxine (SYNTHROID/LEVOTHROID) tablet 25 mcg  25 mcg Oral Once per day on Sun Tue Wed Thu Sat     levothyroxine (SYNTHROID/LEVOTHROID) tablet 50 mcg  50 mcg Oral Once per day on Mon Fri     QUEtiapine  25 mg Oral BID     sodium chloride (PF)  3 mL Intracatheter Q8H

## 2018-05-29 NOTE — PLAN OF CARE
Problem: Patient Care Overview  Goal: Plan of Care/Patient Progress Review  A/O x 2, Confused, restless, mumbled and illogical speech, hallucinations - prn seroquel given x 1 - no apparent effect. Denies pain. BP elevated, afebrile, satting well on RA.  Incontinent of clear/pink mucusy stool. Voiding in urinal at bedside, A1-2.  Miconazole applied to rash on groin.  Bruising and red rash on BLE. Difficulty swallowing noted, speech is following. Continue to monitor

## 2018-05-29 NOTE — PROGRESS NOTES
AMRITA  I: AMRITA was updated that patient has changed to comfort care. After conversation with son and family is interested in having patient d/c to [facility] hospice home. AMRITA called and left message for [facility] checking bed availability. AMRITA also discussed the possibility of applying for MA and d/cing to LTC facility. Son is agreeable to either plan but would like to check with [facility] before any other plan. SW awaits a call back from [facility] admissions.      ADDENDUM  I: [facility] has a male bed but no private. Admissions is checking if they can accommodate patient's ESBL in a double. Admissions will contact SW once confirmed.     ADDENDUM  Juana at [facility] stated they can accept with ESBL. SW will send application/referral.    ADDENDUM  I: AMRITA was updated that patient had been accepted at [facility] and could d/c to facility in AM on 5/30. SW spoke with son and he is agreeable. AMRITA will arrange stretcher transport for patient due to impaired cognition, d/cing on hospice, hallucinating, assist of 2. AMRITA arranged stretcher for 930 on 5/30. ARMITA updated MD. NO MEDS will be sent. AMRITA will fax orders when complete. AMRITA updated OLOP via .    P: AMRITA will continue to follow and assist as needed.    Ebony Harrington, JAYDA   *[phone]

## 2018-05-29 NOTE — TELEPHONE ENCOUNTER
"Requested Prescriptions   Pending Prescriptions Disp Refills     furosemide (LASIX) 20 MG tablet [Pharmacy Med Name: FUROSEMIDE 20MG TABLETS]  Last Written Prescription Date:  4/28/18  Last Fill Quantity: 30,  # refills: 0   Last office visit: 5/22/2018 with prescribing provider:  Rachana   Future Office Visit:   Next 5 appointments (look out 90 days)     Jun 06, 2018  9:45 AM CDT   Return Visit with Gretel Quinn MD   [facility] ([facility])    [facility]  [address]  [facility] [address]   [phone]                  30 tablet 0     Sig: TAKE 1 TABLET BY MOUTH EVERY DAY    Diuretics (Including Combos) Protocol Failed    5/27/2018  3:14 AM       Failed - Blood pressure under 140/90 in past 12 months    BP Readings from Last 3 Encounters:   05/29/18 128/56   05/22/18 138/70   05/20/18 166/77                Passed - Recent (12 mo) or future (30 days) visit within the authorizing provider's specialty    Patient had office visit in the last 12 months or has a visit in the next 30 days with authorizing provider or within the authorizing provider's specialty.  See \"Patient Info\" tab in inbasket, or \"Choose Columns\" in Meds & Orders section of the refill encounter.           Passed - Patient is age 18 or older       Passed - Normal serum creatinine on file in past 12 months    Recent Labs   Lab Test  05/29/18   0755   CR  1.02             Passed - Normal serum potassium on file in past 12 months    Recent Labs   Lab Test  05/28/18   0730   POTASSIUM  3.8                   Passed - Normal serum sodium on file in past 12 months    Recent Labs   Lab Test  05/28/18   0730   NA  142                "

## 2018-05-29 NOTE — PLAN OF CARE
Problem: Patient Care Overview  Goal: Plan of Care/Patient Progress Review  Outcome: Declining  Shift Report:  Pt with intermittent bouts of hallucinating and frustration, seroquel given and haldol now available. Palliative meeting and now comfort focus. Son Alfredo here and helpful. Pt impulsive and hallucinating, cooperative most of the time. Refusing PO and activity at times. Up to chair with 2. Groin rash and petechiae with dry/cracked lips.

## 2018-05-30 NOTE — PLAN OF CARE
Problem: Patient Care Overview  Goal: Plan of Care/Patient Progress Review  Outcome: No Change  Pt alert to self only, agitated at times. Transitioned to comfort cares today. On RA. Denies pain. Repo as needed. Coccyx reddened, meplex in place. IV removed for comfort. Hallucinating frequently, haldol given. Per family request no BS checks. Plan to d/c to [facility] tomorrow at 0930. Will continue to keep comfortable.

## 2018-05-30 NOTE — PLAN OF CARE
Problem: Patient Care Overview  Goal: Plan of Care/Patient Progress Review  Outcome: Declining  Comfort cares. Oriented to self. Garbled, illogical speech. Hallucinates. Denied pain. Incontinent. Rashy areas on thighs and groin area. Polymem in place on right groin. Plan for discharge to [facility] today at 0930. Continue to monitor.

## 2018-05-30 NOTE — PLAN OF CARE
Problem: Patient Care Overview  Goal: Plan of Care/Patient Progress Review  Occupational Therapy Discharge Summary    Reason for therapy discharge:    Discharged to [facility] with hospice    Progress towards therapy goal(s). See goals on Care Plan in Epic electronic health record for goal details.  Goals not met.  Barriers to achieving goals:   discharge from facility.    Therapy recommendation(s):    No further therapy is recommended.

## 2018-05-30 NOTE — PLAN OF CARE
Problem: Patient Care Overview  Goal: Plan of Care/Patient Progress Review  Outcome: Adequate for Discharge Date Met: 05/30/18  VS deferred, comfort cares.  Pt discharge from unit today at 0930 via [facility] transport service on [facility] to [facility].  All personal belongings sent with patient.  Son at bedside.

## 2018-05-30 NOTE — PROGRESS NOTES
AMRITA  I: AMRITA faxed orders to [facility]. Transport is arranged for 930. Son is aware of d/c time.    P: SW will continue to follow and assist as needed.    Ebony Harrington, JAYDA   *[phone]

## 2018-05-30 NOTE — PROGRESS NOTES
Oral Chemotherapy Monitoring Program.    Pharmacy signing off on patient monitoring while no longer on oral chemotherapy. Will continue to follow as needed/appropriate. Thank you for the opportunity to participate in this patient's care.    Maria Eugenia Cortes PharmD  May 30, 2018

## 2018-05-30 NOTE — DISCHARGE SUMMARY
[facility]  Discharge Summary        Roc Parkinson MRN# [id number]   YOB: 1926 Age: 89 year old     Date of Admission: 5/23/2018  Date of Discharge: 5/30/2018  Admitting Physician: Chante Blas MD  Discharge Physician: Cecilio Medeiros MD     Primary Provider: Dickson Reich  Primary Care Physician Phone Number: [phone]         Discharge Diagnoses:   1. Fevers with weakness with concern for sepsis - suspect due to chemo (Revlimid).  2. AMS with hallucinations, suspect acute delirium vs toxic encephalopathy.  3. Multiple myeloma/myelodysplastic syndrome.   4. Pancytopenia suspect due to above and chemotherapy.  5. Acute kidney injury stage 1 on chronic kidney disease, suspect prerenal.  6. Weakness and deconditioning due to above.  7. Recent UTI with ESBL producing organism.        Other Chronic Medical Problems:      1. DM2 w/o complications.  2. Hypertension (benign essential).   3. History of diastolic CHF with preserved ejection fraction of 55-60%.   4. Dyslipidemia.    5. Hypothyroidism.    6. Paroxysmal atrial fibrillation.         Allergies:       No Known Allergies        Discharge Medications:        Current Discharge Medication List      START taking these medications    Details   acetaminophen (TYLENOL) 325 MG tablet Take 2 tablets (650 mg) by mouth every 4 hours as needed for mild pain or fever  Qty: 100 tablet, Refills: 1    Associated Diagnoses: Multiple myeloma not having achieved remission (H)      acetaminophen (TYLENOL) 650 MG Suppository Place 1 suppository (650 mg) rectally every 4 hours as needed for fever or mild pain (Do not exceed 4000 mg total acetaminophen per day.) Unwrap prior to insertion.  Qty: 12 suppository, Refills: 1    Associated Diagnoses: Multiple myeloma not having achieved remission (H)      atropine 1 % ophthalmic solution Take 2 drops by mouth, place under tongue or place inside cheek every 2 hours as needed for other  (terminal respiratory secretions) Not for ophthalmic use.  Qty: 5 mL, Refills: 1    Associated Diagnoses: Multiple myeloma not having achieved remission (H)      bisacodyl (DULCOLAX) 10 MG Suppository Unwrap and insert 1 suppository rectally twice daily as needed for constipation.  Qty: 12 suppository, Refills: 1    Associated Diagnoses: Multiple myeloma not having achieved remission (H)      haloperidol (HALDOL) 2 MG/ML (HIGH CONC) solution Take 0.5 mLs (1 mg) by mouth, place under tongue or insert rectally every 6 hours as needed for agitation (nausea)  Qty: 30 mL, Refills: 1    Associated Diagnoses: Multiple myeloma not having achieved remission (H)      HYDROmorphone, HIGH CONC, (DILAUDID) 10 mg/mL LIQD oral Take 0.2 mLs (2 mg) by mouth or place under tongue every 2 hours as needed for moderate to severe pain (and/or  shortness of breath)  Qty: 30 mL, Refills: 0    Associated Diagnoses: Multiple myeloma not having achieved remission (H)      MEDICATION INSTRUCTION If care facility cannot accept or use ranges, facility is instructed to use lower end of dosing range    Associated Diagnoses: Multiple myeloma not having achieved remission (H)      QUEtiapine (SEROQUEL) 25 MG tablet Take 1 tablet (25 mg) by mouth 2 times daily  Qty: 28 tablet, Refills: 0    Associated Diagnoses: Delirium      sennosides (SENOKOT) 8.6 MG tablet Take 1 tablet by mouth 2 times daily as needed for constipation  Qty: 100 tablet, Refills: 1    Associated Diagnoses: Multiple myeloma not having achieved remission (H)         CONTINUE these medications which have CHANGED    Details   prochlorperazine (COMPAZINE) 5 MG tablet Take 1 tablet (5 mg) by mouth every 6 hours as needed (Nausea/Vomiting)  Qty: 30 tablet, Refills: 0    Associated Diagnoses: Multiple myeloma not having achieved remission (H)         CONTINUE these medications which have NOT CHANGED    Details   !! Levothyroxine Sodium (SYNTHROID PO) Take 50 mcg by mouth twice a week  Monday and Friday      !! LEVOTHYROXINE SODIUM PO Take 25 mcg by mouth five times a week Tuesday, Wednesday, Thursday, Saturday, Sunday      polyethylene glycol (MIRALAX/GLYCOLAX) Packet Take 17 g by mouth 2 times daily as needed (constipation)  Qty: 7 packet    Associated Diagnoses: Slow transit constipation      B-D U/F 31G X 8 MM insulin pen needle USE 5 PEN NEEDLES PER DAY OR AS DIRECTED  Qty: 500 each, Refills: 1    Associated Diagnoses: Type 2 diabetes mellitus with stage 3 chronic kidney disease, with long-term current use of insulin (H)      Blood Glucose Monitoring Suppl MISC 3 times daily (before meals)       FREESTYLE LITE test strip USE TO TEST FOUR TIMES DAILY  Qty: 400 strip, Refills: 1    Associated Diagnoses: Uncontrolled type 1 diabetes mellitus with stage 3 chronic kidney disease (H)       !! - Potential duplicate medications found. Please discuss with provider.      STOP taking these medications       ACYCLOVIR PO Comments:   Reason for Stopping:         amLODIPine (NORVASC) 10 MG tablet Comments:   Reason for Stopping:         Ascorbic Acid (VITAMIN C PO) Comments:   Reason for Stopping:         dexamethasone (DECADRON) 4 MG tablet Comments:   Reason for Stopping:         ferrous sulfate (IRON) 325 (65 FE) MG tablet Comments:   Reason for Stopping:         furosemide (LASIX) 20 MG tablet Comments:   Reason for Stopping:         gemfibrozil (LOPID) 600 MG tablet Comments:   Reason for Stopping:         insulin aspart (NOVOLOG) 100 UNITS/ML injection Comments:   Reason for Stopping:         INSULIN ASPART SC Comments:   Reason for Stopping:         INSULIN ASPART SC Comments:   Reason for Stopping:         INSULIN ASPART SC Comments:   Reason for Stopping:         INSULIN ASPART SC Comments:   Reason for Stopping:         insulin detemir (LEVEMIR FLEXPEN/FLEXTOUCH) 100 UNIT/ML injection Comments:   Reason for Stopping:         LENalidomide (REVLIMID) 5 MG CAPS capsule CHEMO Comments:   Reason for  Stopping:         LISINOPRIL PO Comments:   Reason for Stopping:         Selenium 200 MCG TABS Comments:   Reason for Stopping:         VITAMIN D, CHOLECALCIFEROL, PO Comments:   Reason for Stopping:                   Discharge Instructions and Follow-Up:      Follow-up Appointments     Follow Up and recommended labs and tests       Admit to hospice.  Follow up with hospice providers.                          Consultations This Hospital Stay:      1. Hematology-Oncology ([place]).  2. Palliative Care.        Admission History:      Please see the H&P by Chante Blas MD on 5/23/2018 for complete details. Briefly, Mr. Roc Parkinson is a 89 year old male with medical history including multiple myeloma, DM2, HTN, CHF, PAF, h/o prostate and bladder cancer, and h/o recurrent UTI's including recent ESBL producing producing E. Coli, who presented 5/23/2018 with weakness and found with fever and abnormal UA.        Problem Oriented Hospital Course:      1. Fevers with weakness with concern for sepsis - suspect due to chemo (Revlimid).  Presented 5/23 with generalized weakness and fever of 103.1; WBC low but not neutropenic; initial lactic acid of 2.6; UA mildly abnormal. CXR was negative for acute infiltrate. Hx of recurrent UTI with recent ESBL E. Coli and had completed a course of IV Ertapenem on 5/20/18 and was thus started empirically on ertapenem on 5/23. UC from 5/23 was ultimately negative. Given 1 dose vanco 5/25. Procalcitonin 0.84 (moderate risk) on 5/25. BC's so far negative as of 5/26. Of note, pt recently started on Revlimid (lenalidomide) on 5/21 which can have adverse reaction of fever. Seen by Oncology 5/24 and chemo held. CT CAP on 5/25 negative for signs of focal infection. Afebrile since am 5/25. Ertapenem stopped 5/26. Repeat UA 5/2 abnormal, but with 2 sq epithelial cells. Micro: BC's 5/23 NGTD. UC 5/23 negative. BC 5/25 NGTD. C. Diff toxin B 5/25 negative.     2. AMS with hallucinations,  suspect acute delirium vs toxic encephalopathy.  Pt with hallucinations 5/27 but pt aware of them. Son 5/27 noted that he had been having hallucinations since PTA but worse on 5/27. ?side effect from ertapenem (stopped 5/26). Added PRN Seroquel 5/27. Still hallucinating/delirious 5/28. Scheduled Seroquel 5/28. UA 5/28 not impressive for infection.     3. Multiple myeloma/myelodysplastic syndrome.       Pancytopenia suspect due to above and chemotherapy.  He is being followed by Dr. Quinn of Hematology/Oncology. He has been on multiple chemotherapy regimens. As per the last notes, he was recently started on Revlimid daily on 5/21 and planned for 21-day course, then 7 days off, and he also was taking dexamethasone weekly. Seen by Oncology 5/24 and Revlimid and dexamethasone held. After further d/w Dr. Villaseñor 5/26, no plans for further treatment recommended.     4. Acute kidney injury stage 1 on chronic kidney disease, suspect prerenal.  His baseline creatinine seems to be between 0.9-1.2. His creatinine was 1.48 on admission. Cr normalized 5/26.      5. Weakness and deconditioning due to above.     6. DM2 w/o complications.  [PTA: Levemir 17 units subcutaneously every morning; insulin aspart 13U every morning, 11U with lunch, 9U with dinner, 5U at HS.]  His most recent hemoglobin A1c was 5.5 in March 2018. Glucose trending low on 5/25 evening. Levemir and prandial aspart held 5/26. Glucose levels stable despite being off long acting insulin.     OVERALL, after further d/w Oncology and Palliative Care team, pt was transitioned to comfort cares on 5/29.            Code Status:      Comfort Care        Pending Results:      None.          Discharge Disposition:      Discharged to hospice.        Discharge Time:      Greater than 30 minutes.        Key Imaging Studies, Lab Findings and Procedures/Surgeries:        Results for orders placed or performed during the hospital encounter of 05/23/18   XR Chest 2 Views     Narrative    CHEST TWO VIEWS 5/23/2018 10:40 PM     HISTORY: Fever, chemotherapy patient.     COMPARISON: 3/25/2018.      Impression    IMPRESSION: Interval resolution of previously seen right pleural  effusion and associated infiltrate/atelectasis. A smaller left pleural  effusion on the prior exam has also resolved. No confluent infiltrates  or definite mass lesions. Heart size is difficult evaluate due to  rotation of the patient, but it is probably unchanged.    SHERYL AVALOS MD   CT Chest Abdomen Pelvis w/o Contrast    Narrative    CT CHEST, ABDOMEN, AND PELVIS WITHOUT CONTRAST 5/25/2018 12:21 PM     HISTORY: Persistent fever. Evaluate for possible source of infection.    COMPARISON: May 25, 2018    TECHNIQUE: Volumetric helical acquisition of CT images from the lung  apices through the symphysis pubis without contrast. Radiation dose  for this scan was reduced using automated exposure control, adjustment  of the mA and/or kV according to patient size, or iterative  reconstruction technique.    FINDINGS:   Chest: Bilateral calcified pleural plaques noted which are nonspecific  but may be related to prior asbestos exposure. Minimal peripheral  fibrosis and/or atelectasis noted. Trace pleural fluid or pleural  thickening bilaterally. No pericardial effusion. Heart may be generous  in size. There are moderate atherosclerotic changes of the visualized  aorta and its branches. There is no evidence of aortic aneurysm.    Abdomen and pelvis: Spleen is enlarged measuring 18.1 cm. There is  cholelithiasis without evidence for cholecystitis. The liver, spleen,  adrenal glands, kidneys, and pancreas demonstrate no worrisome focal  lesion in the absence of intravenous contrast. 3 mm nonobstructing  stone in the inferior pole of the right kidney. Tiny simple cysts in  both kidneys. There are no dilated loops of small intestine or large  bowel to suggest ileus or obstruction. No free air or free fluid.    Survey of the  visualized bony structures demonstrates no destructive  bony lesions.      Impression    IMPRESSION:  1. Marked splenomegaly at 18.1 cm.  2. Bilateral calcified pleural plaques which are nonspecific but may  be related to asbestos exposure.  3. Otherwise no convincing occult infection demonstrated in the chest,  abdomen, and pelvis.    DAQUAN KENNEDY MD     \

## 2018-05-31 ENCOUNTER — TELEPHONE (OUTPATIENT)
Dept: FAMILY MEDICINE | Facility: CLINIC | Age: 83
End: 2018-05-31

## 2018-05-31 LAB
BACTERIA SPEC CULT: NO GROWTH
BACTERIA SPEC CULT: NO GROWTH
Lab: NORMAL
Lab: NORMAL
SPECIMEN SOURCE: NORMAL
SPECIMEN SOURCE: NORMAL

## 2018-05-31 NOTE — TELEPHONE ENCOUNTER
Reason for Call:  Other death    Detailed comments:  1830 hours at hospice 18    Phone Number Patient can be reached at: Other phone number:      Best Time:     Can we leave a detailed message on this number? Not Applicable    Call taken on 2018 at 8:45 AM by JIL TIAN

## 2018-06-04 ENCOUNTER — TELEPHONE (OUTPATIENT)
Dept: ONCOLOGY | Facility: CLINIC | Age: 83
End: 2018-06-04

## 2018-06-04 NOTE — TELEPHONE ENCOUNTER
Social Work Progress Note      Data/Intervention:  Patient Name:  Roc Parkinson  /Age:  1926 (89 year old)    Reason for Follow-Up:  This clinician received call this morning from pt's son, Bam with pharmacy questions.     Intervention:  Bam contacted this clinician to inform clinic of Roc's death 18 at [facility]. Provided safe space for family to process sudden nature of death. Family planning on  services 18. Family with questions today paperwork they recently received regarding outstanding bill for mediation of $10,000. This clinician reminded family that pt was approved for assistance through foundation for medication costs with assistance from pharmacy liaison. Family appreciative of speaking with pharmacy liaison regarding form when available.     Plan:  1) Jerry Dee covering for Patricia Abdi today will follow-up with family regarding questions about paperwork received  2) Family aware of this clinician's ongoing availability and knows to reach out in the case of psychosocial distress or concern.     Please call or page if needs or concerns arise.     WESLEY Negro, LICSW  Direct Phone: [phone]  Pager: [phone]

## 2018-06-06 ENCOUNTER — HOSPITAL ENCOUNTER (OUTPATIENT)
Facility: CLINIC | Age: 83
Setting detail: SPECIMEN
End: 2018-06-06
Attending: INTERNAL MEDICINE
Payer: MEDICARE

## 2021-07-23 NOTE — MR AVS SNAPSHOT
After Visit Summary   10/11/2017    Roc Parkinson    MRN: [id number]           Patient Information     Date Of Birth          7/7/1926        Visit Information        Provider Department      10/11/2017 9:30 AM  DIABETIC ED RESOURCE [facility] Instructions    Changes today:'    Novolog  Before breakfast take 11 units.  Before lunch take 10 units.  Before dinner take 9 units.  Bedtime take 5 units.    Continue this scale:  If blood sugar is                 Take Novolog  140-175                                1 unit extra   176-210                                2 units extra  211-245                                3 units extra    246-280                                4 units extra   281-315                                5 units extra   316-350                                6 units extra   351-385                                7 units extra    386-420                                8 units extra  Over 420                               9 units extra      Continue Levemir 14 units every morning.    Wednesday and Thursday morning Levemir 16 units.     If you have any questions, call [phone].          Follow-ups after your visit        Your next 10 appointments already scheduled     Oct 18, 2017 10:30 AM CDT   Level 2 with  INFUSION CHAIR 18   [facility] ([facility])    [facility]  [address]  [facility] [address]   [phone]            Oct 18, 2017 11:00 AM CDT   Return Visit with Gretel Quinn MD   [facility] ([facility])    [facility]  [address]  [facility] [address]   [phone]            Oct 23, 2017 10:30 AM CDT   PHYSICAL with Dickson Reich MD   [facility] ([facility])    [address] [place] [address]  [FreeTextEntry1] : I, Rodrigue Collins, hereby attest that the medical record entry for this patient accurately reflects signatures/notations that I made on the Date of Service in my capacity as an Attending Physician when I treated/diagnosed the above patient. I do hereby attest that this information is true, accurate and complete to the best of my knowledge and I understand that any falsification, omission, or concealment of material fact may subject me to administrative, civil, or, criminal liability.   "[id number]   [phone]            Oct 25, 2017   Procedure with Shady Haider MD   [facility] (--)    [address]  [place] [address]   [phone]            Nov 01, 2017   Procedure with Shady Haider MD   [facility] (--)    [address]  [place] [address]   [phone]            Nov 07, 2017  9:30 AM CST   Diabetic Education with  DIABETIC ED RESOURCE   [facility]    [address] [address] [phone]              Who to contact     If you have questions or need follow up information about today's clinic visit or your schedule please contact [facility] directly at [phone].  Normal or non-critical lab and imaging results will be communicated to you by Loop[employer]hart, letter or phone within 4 business days after the clinic has received the results. If you do not hear from us within 7 days, please contact the clinic through [employer] or phone. If you have a critical or abnormal lab result, we will notify you by phone as soon as possible.  Submit refill requests through P[employer]ers or call your pharmacy and they will forward the refill request to us. Please allow 3 business days for your refill to be completed.          Additional Information About Your Visit        Picateers Information     Picateers lets you send messages to your doctor, view your test results, renew your prescriptions, schedule appointments and more. To sign up, go to [email or web address] . Click on \"Log in\" on the left side of the screen, which will take you to the Welcome page. Then click on \"Sign up Now\" on the right side of the page.     You will be asked to enter the access code listed below, as well as some personal information. Please follow the directions to create your username and password.     Your access code is: [email or web address]  Expires: " 2017 10:12 AM     Your access code will  in 90 days. If you need help or a new code, please call your [facility] clinic or [phone].        Care EveryWhere ID     This is your Care EveryWhere ID. This could be used by other organizations to access your [facility] medical records  [id number]         Blood Pressure from Last 3 Encounters:   10/04/17 128/54   17 123/55   17 152/73    Weight from Last 3 Encounters:   10/04/17 93 kg (205 lb)   17 93.3 kg (205 lb 9.6 oz)   17 93.3 kg (205 lb 9.6 oz)              Today, you had the following     No orders found for display       Primary Care Provider Office Phone # Fax #    Dickson Reich [phone] [address] [facility] [address]        Equal Access to Services     ELIE GAGNON : Hadii aad ku hadasho Soomaali, waaxda luqadaha, qaybta kaalmada adeegyada, waxay idiin hayaan vipineg kharacarrillo shaw . So [facility] [phone].    ATENCIÓN: Si habla español, tiene a nolan disposición servicios gratuitos de asistencia lingüística. Llame al [phone].    We comply with applicable federal civil rights laws and [place] laws. We do not discriminate on the basis of race, color, national origin, age, disability, sex, sexual orientation, or gender identity.            Thank you!     Thank you for choosing [facility]  for your care. Our goal is always to provide you with excellent care. Hearing back from our patients is one way we can continue to improve our services. Please take a few minutes to complete the written survey that you may receive in the mail after your visit with us. Thank you!             Your Updated Medication List - Protect others around you: Learn how to safely use, store and throw away your medicines at www.disposemymeds.org.          This list is accurate as of: 10/11/17 10:35 AM.  Always use your most recent med list.                   Brand Name Dispense  Instructions for use Diagnosis    acyclovir 400 MG tablet    ZOVIRAX    60 tablet    Take 1 tablet (400 mg) by mouth 2 times daily Viral Prophylaxis.    Multiple myeloma not having achieved remission (H)       amLODIPine 5 MG tablet    NORVASC    30 tablet    TAKE 1 TABLET BY MOUTH DAILY    Essential hypertension, benign       * B-D U/F 31G X 8 MM   Generic drug:  insulin pen needle           * B-D U/F 31G X 8 MM   Generic drug:  insulin pen needle     500 each    USE 5 PEN NEEDLES PER DAY OR AS DIRECTED    Type 2 diabetes mellitus with stage 3 chronic kidney disease, with long-term current use of insulin (H)       * cyclophosphamide 50 MG capsule CHEMO    CYTOXAN    24 capsule    Take 6 capsules (300 mg) by mouth once a week    Multiple myeloma not having achieved remission (H)       * cyclophosphamide 50 MG capsule CHEMO    CYTOXAN    24 capsule    Take 6 capsules (300 mg) by mouth once a week    Multiple myeloma not having achieved remission (H)       dexamethasone 4 MG tablet    DECADRON    40 tablet    Take 10 tablets (40 mg) by mouth every 7 days for 4 doses Take daily on Days 1, 8, 15, and 22.    Multiple myeloma not having achieved remission (H)       ferrous sulfate 325 (65 FE) MG tablet    IRON     Take 325 mg by mouth daily (with breakfast)        FREESTYLE LITE test strip   Generic drug:  blood glucose monitoring     200 strip    USE 1 STRIP TO TEST TWICE DAILY.    Uncontrolled type 1 diabetes mellitus with stage 3 chronic kidney disease (H)       furosemide 20 MG tablet    LASIX    30 tablet    Take 1 tablet (20 mg) by mouth daily    Multiple myeloma not having achieved remission (H)       gemfibrozil 600 MG tablet    LOPID    180 tablet    TAKE 1 TABLET BY MOUTH TWICE DAILY    Hyperlipidemia       glipiZIDE 10 MG tablet    GLUCOTROL    180 tablet    TAKE 1 TABLET BY MOUTH TWICE DAILY BEFORE A MEAL    Type 2 diabetes mellitus with stage 3 chronic kidney disease, with long-term current use of insulin (H)        insulin aspart 100 UNIT/ML injection    NovoLOG PEN    12 mL    Inject 8 Units Subcutaneous 3 times daily (with meals) Before meal correction: 140-175 add 1 unit  176-210 add 2 units  211-245 add 3 units  246-280 add 4 units  281-315 add 5 units 316-350 add 6 units  351-385 add 7 units 386-420 add 8 units  Over 420 add 9 units    Uncontrolled type 2 diabetes mellitus with hyperglycemia, unspecified long term insulin use status (H)       insulin detemir 100 UNIT/ML injection    LEVEMIR FLEXPEN/FLEXTOUCH    15 mL    Inject 14 Units Subcutaneous every morning (before breakfast) On Wednesday and Thursday morning, inject 16 units.    Type 2 diabetes mellitus with diabetic chronic kidney disease, unspecified CKD stage, unspecified long term insulin use status (H)       levothyroxine 25 MCG tablet    SYNTHROID/LEVOTHROID    90 tablet    TAKE 1 TABLET BY MOUTH EVERY DAY.    Acquired hypothyroidism       lisinopril 30 MG tablet    PRINIVIL,ZESTRIL    90 tablet    TAKE 1 TABLET BY MOUTH EVERY DAY    Essential hypertension, benign       metoprolol 25 MG tablet    LOPRESSOR    180 tablet    TAKE 1 TABLET BY MOUTH TWICE DAILY    Essential hypertension       omeprazole 20 MG CR capsule    priLOSEC    90 capsule    TAKE 1 CAPSULE BY MOUTH EVERY DAY    Congenital hiatus hernia       ondansetron 8 MG tablet    ZOFRAN    10 tablet    Take 1 tablet (8 mg) by mouth every 8 hours as needed (Nausea/Vomiting)    Multiple myeloma not having achieved remission (H)       polyethylene glycol powder    MIRALAX/GLYCOLAX          prochlorperazine 5 MG tablet    COMPAZINE    30 tablet    Take 1 tablet (5 mg) by mouth every 6 hours as needed (Nausea/Vomiting)    Multiple myeloma not having achieved remission (H)       Selenium 200 MCG Tabs      Take 100 mcg by mouth daily. Pt takes one half tab of the 200 mcg daily        tamsulosin 0.4 MG capsule    FLOMAX    90 capsule    TAKE 1 CAPSULE BY MOUTH DAILY    Malignant neoplasm of prostate (H)        VITAMIN C PO      Take 1,000 mg by mouth daily        VITAMIN D3 PO      Take 1,000 Units by mouth daily        * Notice:  This list has 4 medication(s) that are the same as other medications prescribed for you. Read the directions carefully, and ask your doctor or other care provider to review them with you.

## 2021-09-05 NOTE — ORAL ONC MGMT
Oral Chemotherapy Monitoring Program.    Patient currently on cytoxan therapy.    Reviewed labs from 5/17/17.     No concerning abnormalities. Ok to proceed with treatment despite lower plt counts.    Will follow up in 4 weeks with repeat labs.    Maria Eugenia Cortes PharmD  May 17, 2017       Patient ID:  Obed Hernandez 9/5/2021 11:46 AM  [id number]  1940 81 year old    CHIEF COMPLAINT:  Malaise        Code status: No Order      This physician, recommends Obed Hernandez be admitted as an INPATIENT to the medical unit/ICU. The expected LOS exceeds 2 midnights. Reason(s) for INPATIENT CARE include community-acquired pneumonia, COVID pneumonia based on severity of presenting sx, co-morbidities, and lab/imaging, this patient has an increased risk of adverse outcomes.        HPI:  Patient is an 81-year-old male with a past medical history as detailed below comes in with a chief complaint of malaise.  Patient reports that ever since he was diagnosed with COVID a little over week ago and subsequently received antibody infusion this past Wednesday he has been feeling “run down”.  He describes subjective fevers, body aches, and lethargy.  Also reports an associated productive cough with white sputum.    PAST MEDICAL HISTORY:     Left inguinal hernia                                          PAD (peripheral artery disease) (CMS/[facility])       10/28/2020    Stroke (cerebrum) (CMS/[facility])                     2015            Comment: Full recovery    Malignant neoplasm of prostate (CMS/[facility])                      Essential (primary) hypertension                              High cholesterol                                              Coronary artery disease                                       Sleep apnea                                                     Comment: uses CPAP    RENE on CPAP                                                   PAST SURGICAL HISTORY:    ENDARTERECTOMY                                  2015            Comment: carotid artery    INGUINAL HERNIA REPAIR                                        ORAL SURGERY PROCEDURE                                        PROSTATECTOMY                                                   Comment: robotic-assited    TONSILLECTOMY                                                  SPINE SURGERY                                   09/2019         Comment: lower back    BACK SURGERY                                    09/2019         Comment: L3-5 hemilaminectomy    CARDIAC CATHETERIZATION                         02/26/2014    CORONARY ANGIOPLASTY WITH STENT PLACEMENT                       Comment: x7    FAMILY HISTORY:  Family History   Problem Relation Age of Onset   • Other Mother         carotid disorder, low back pain   • Heart disease Mother    • Osteoarthritis Mother        SOCIAL HISTORY:  Social History     Socioeconomic History   • Marital status: /Civil Union     Spouse name: Not on file   • Number of children: Not on file   • Years of education: Not on file   • Highest education level: Not on file   Occupational History   • Occupation: retired   Tobacco Use   • Smoking status: Never Smoker   • Smokeless tobacco: Never Used   Vaping Use   • Vaping Use: never used   Substance and Sexual Activity   • Alcohol use: Never   • Drug use: Never   • Sexual activity: Not on file   Other Topics Concern   • Not on file   Social History Narrative   • Not on file     Social Determinants of Health     Financial Resource Strain:    • Social Determinants: Financial Resource Strain:    Food Insecurity:    • Social Determinants: Food Insecurity:    Transportation Needs:    • Lack of Transportation (Medical):    • Lack of Transportation (Non-Medical):    Physical Activity:    • Days of Exercise per Week:    • Minutes of Exercise per Session:    Stress:    • Social Determinants: Stress:    Social Connections:    • Social Determinants: Social Connections:    Intimate Partner Violence: Not At Risk   • Social Determinants: Intimate Partner Violence Past Fear: No   • Social Determinants: Intimate Partner Violence Current Fear: No       MEDICATIONS:   (Not in a hospital admission)      ALLERGIES:  Allergies as of 09/05/2021 - Reviewed 09/05/2021   Allergen Reaction Noted   • Morphine RASH 01/24/2014        REVIEW OF SYSTEMS:  Positive for:  Malaise  Denies: N/V/D/F/C/CP/SOB/palpitations  All other systems reviewed and negative.      PHYSICAL EXAM:  VITAL SIGNS:   Visit Vitals  /57   Pulse 68   Temp 98.2 °F (36.8 °C) (Oral)   Resp 18   Wt 65.8 kg   SpO2 94%   BMI 23.40 kg/m²     GEN: Alert, oriented x 3, no acute distress. Well developed male appears stated age.  EYES: PERRLA, no scleral icterus, conjunctiva are pink.  ENT: Oral mucous membranes are moist. No oral lesions.  NECK: Supple. Trachea is midline. No thyromegaly.  CV: Regular rate and rhythm. No murmurs.  LUNGS: Clear to auscultation bilaterally. Normal respiratory effort.  ABD: Soft, non-tender, non-distended, active bowel sounds. No HSM appreciated.  EXT: No edema, cyanosis or clubbing.  MS: Normal muscle mass and tone. Normal ROM.  SKIN: Warm and dry. No rashes.       LABS:   Lab Results   Component Value Date    SODIUM 137 09/05/2021    POTASSIUM 3.4 09/05/2021    CHLORIDE 100 09/05/2021    CO2 27 09/05/2021    GLUCOSE 95 09/05/2021    BUN 21 (H) 09/05/2021    CREATININE 0.91 09/05/2021    ALBUMIN 2.5 (L) 09/05/2021    BILIRUBIN 1.4 (H) 09/05/2021    AST 80 (H) 09/05/2021    INR 1.1 09/05/2021     Lab Results   Component Value Date    WBC 12.5 (H) 09/05/2021    HGB 13.1 09/05/2021    HCT 39.0 09/05/2021     09/05/2021    RAPDTR <0.02 09/05/2021     09/05/2021    CRP 16.8 (H) 09/05/2021    TSH 3.181 10/30/2020       ECG:   I have personally reviewed the EKG.     DIAGNOSTICS:   XR Chest AP or PA    Result Date: 9/5/2021  Narrative: EXAM: XR CHEST AP OR PA INDICATION: CP. Positive Covid COMPARISON: 8/27/2021. FINDINGS:  Cardiomediastinal contours are stable.  Coronary stent is identified. There are new interstitial and hazy alveolar opacities throughout the lungs, RIGHT greater than LEFT.  This is greatest in the RIGHT lower lung. No pleural effusion.  No pneumothorax.     Impression: IMPRESSION: 1. Increased interstitial and  alveolar opacities (RIGHT greater than LEFT) which are nonspecific, but support a diagnosis of Covid pneumonia.     XR CHEST PA AND LATERAL    Result Date: 8/27/2021  Narrative: XR CHEST PA AND LATERAL HISTORY:  Cough, shortness of breath COMPARISON:  None. TECHNIQUE:  2 views, frontal and lateral     Impression: FINDINGS/IMPRESSION: The cardiomediastinal silhouette is within normal limits. Pulmonary vessels are normally distributed. Mild strandy opacities within the right middle lobe may reflect atelectasis or mild infiltrate. No pleural effusion. No pneumothorax. Moderate degenerative changes throughout the thoracic spine.     CT Chest PE Imaging    Result Date: 9/5/2021  Narrative: EXAM: CT ANGIOGRAM CHEST PE IMAGING- 3D HISTORY: PE suspected, low/intermediate prob, positive D-dimer. COMPARISON: 9/5/2021 TECHNIQUE:  Helical CT angiogram of the chest after administration of 75 mL Omnipaque 350 contrast per pulmonary embolus protocol. 3D MIP and multiplanar reformations were reviewed. FINDINGS: QUALITY: Good. VASCULAR Pulmonary Arteries: Pulmonary artery is not significantly dilated. No evidence for pulmonary embolism. Other Vessels: Aorta is not significantly dilated. Heart/Coronaries: Severe coronary calcification and/or stents. CHEST Neck Base: Within normal limits. Mediastinum/Axillae: Mediastinal and right hilar lymphadenopathy. Esophagus: Patulous. Lungs/Pleura: No pleural effusion. Multifocal groundglass opacities bilaterally in organizing pneumonia pattern. Scattered small pulmonary nodules. For example a 3 mm nodule near the left fissure in the lingula, 5/85. Central Airways: Clear. Bones/Soft Tissues: No suspicious lesion. Multilevel degenerative disc disease. Upper Abdomen: Multiple nondilated gas-filled loops of small and large bowel in the upper abdomen. Cholelithiasis. 1.6 cm right renal cyst. Punctate low-density focus within the right renal parenchyma is too small to characterize. Fleischner Society  Guidelines 2017 Solitary or Multiple Solid <6 mm   Low Risk:  No routine follow-up.   High Risk: Optional 12 month CT (suspicious morphology and/or upper lobe location).     Impression: IMPRESSION: 1.  No pulmonary embolism. 2.  Multifocal pneumonia compatible with Covid infection. 3.  Mediastinal and right hilar lymphadenopathy is favored to be reactive. 4.   Scattered tiny pulmonary nodules. No routine follow-up is recommended in case of low risk. 5.  Cholelithiasis. 6.  Other findings in the body of the report.    XR HAND 2 VIEW BILATERAL    Result Date: 8/31/2021  Narrative: XR HAND 2 VIEW BILATERAL INDICATION:  Other chondrocalcinosis, unspecified site COMPARISON:  Right hand 3 views, left hand 3 views February 2021. FINDINGS: Right hand 2 view study There are mild first MTP joint degenerative changes. Some minimal degenerative changes are present of the interphalangeal joint of the thumb and the DIP joints of the second third and fourth digits. No erosive or destructive changes are seen. Bone mineralization is normal. There is no angulation. Visualized portions of the wrist and distal forearm are unremarkable. Left hand 2 views Mineralization is normal. The alignment is normal. There are mild degenerative changes of the first MTP joint and the interphalangeal joint of the thumb. There are is a hypertrophic spur or osteophyte from the lateral aspect of the distal portion of the first metacarpal carpal. This may be related to remote trauma. There are some contour changes in the calcific hyperdensities at the tuft of the distal phalanx of the third digit. This is also probably related to remote trauma. Small exostosis is not excluded. It is stable compared to previous study Elsewhere the osseous architecture and alignment are normal. Appearance of the hands are stable.     Impression: IMPRESSION: 1. Stable appearance of bilateral hands. Mild degenerative changes are present as described above..       ASSESSMENT  AND PLAN:  # community acquired pneumonia, superimposed on covid pneumonia  -rocephin and azithro 9/5--->  -check sputum  -cxr reviewed, worsening opacities noted    # covid pneumonia  -start remdesevir 9/5--->  -monitor inflamatory markers  -decadron 10mg bid iv  -contact/droplet precautions    # CAD  -resume home meds    # HTN  -resume home meds    # DVT proph  -subcutaneous lovenox    Code status-full    Eddi Diane MD  9/5/2021  4:29 PM

## 2022-02-24 NOTE — PATIENT INSTRUCTIONS
1.  Continue cyclophosphamide, dexamethasone   2.  Zometa every 12 weeks  Scheduled/thomas  3. Continue Aranesp 300 mcg SQ every three weeks  Scheduled/Thomas  4- RTC MD 6 weeks  Scheduled/Thomas  5- Labs today SPEP, IFXN, light chains    AVS printed and given to patient/Thomas   [facility] Pregnancy And Lactation Text: This medication is Pregnancy Category C. It is unknown if this medication is excreted in breast milk.

## 2022-10-26 NOTE — PROGRESS NOTES
"Oncology Rooming Note    February 22, 2018 10:56 AM   Roc Parkinson is a 89 year old male who presents for:    Chief Complaint   Patient presents with     Oncology Clinic Visit     RETURN-Multiple myeloma not having achieved remission      Initial Vitals: /89 (BP Location: Right arm, Patient Position: Chair, Cuff Size: Adult Regular)  Pulse 69  Temp 97.6  F (36.4  C) (Oral)  Resp 16  Ht 1.778 m (5' 10\")  Wt 93.9 kg (206 lb 15.7 oz)  SpO2 96%  BMI 29.7 kg/m2 Estimated body mass index is 29.7 kg/(m^2) as calculated from the following:    Height as of this encounter: 1.778 m (5' 10\").    Weight as of this encounter: 93.9 kg (206 lb 15.7 oz). Body surface area is 2.15 meters squared.  No Pain (0) Comment: Data Unavailable   No LMP for male patient.  Allergies reviewed: Yes  Medications reviewed: Yes    Medications: Medication refills not needed today.  Pharmacy name entered into [facility]:    [facility] [place], [place] - [address] [facility] [id number] [place] [address] - [address] [facility]    Clinical concerns: none     5 minutes for nursing intake (face to face time)     Regla Strong CMA            " no 26-Oct-2022 20:12

## 2022-10-28 NOTE — PATIENT INSTRUCTIONS
1. Start weekly Velcade/dexamethasone today.  2. Zometa every 12 weeks  Scheduled - Susy  3. Continue Aranesp 300 mcg SQ every three weeks  Already scheduled - Susy  4. See NP in 2 weeks, see me in 4 weeks   Patient requesting to stay with a Wednesday schedule, NP not at [facility] on Wednesdays - Susy      
No

## 2022-11-28 NOTE — PROVIDER NOTIFICATION
MD Notification    Notified Person: MD    Notified Person Name:  Dr. Howell    Notification Date/Time:5/9/2018 at 0730    Notification Interaction: Spoke with MD    Purpose of Notification:Blood sugar dropped to 58 again yesterday evening.       Orders Received: Cont with levamir as ordered.  MD will make changes to evening insulin.     Comments:     Detail Level: Generalized Include Location In Plan?: No Detail Level: Zone

## 2023-05-15 NOTE — PROGRESS NOTES
Following message sent to patient:  Mr. Lima,  Paring or trimming of calluses would not restrict you afterwards.  You would still be able to shower and do any activity that you desire.  Trimming/paring of calluses removes the layers of dead skin to level of healthy appearing skin.  This should improve the pain level.  The callus though, will return as calluses are due to pressure areas and as long as there is pressure, the callus will return.  So paring/trimming is not a \"cure\" for a callus.      Shazia Osman, PATIENCEM   Infusion Nursing Note:  Roc Parkinson presents today for C1D1 kyprolis.    Patient seen by provider today: No   present during visit today: Not Applicable.    Note: Written info given to son on kyprolis and decadron.    Intravenous Access:  Labs drawn without difficulty.  Peripheral IV placed.    Treatment Conditions:  Lab Results   Component Value Date    HGB 8.3 01/03/2018     Lab Results   Component Value Date    WBC 4.1 01/03/2018      Lab Results   Component Value Date    ANEU 3.7 01/03/2018     Lab Results   Component Value Date     01/03/2018      Lab Results   Component Value Date     01/03/2018                   Lab Results   Component Value Date    POTASSIUM 4.7 01/03/2018           No results found for: MAG         Lab Results   Component Value Date    CR 1.10 01/03/2018                   Lab Results   Component Value Date    MICHELLE 9.2 01/03/2018                Lab Results   Component Value Date    BILITOTAL 0.5 01/03/2018           Lab Results   Component Value Date    ALBUMIN 2.7 01/03/2018                    Lab Results   Component Value Date    ALT 14 01/03/2018           Lab Results   Component Value Date    AST 16 01/03/2018     Results reviewed, labs MET treatment parameters, ok to proceed with treatment.          Post Infusion Assessment:  Patient tolerated infusion without incident.  Site patent and intact, free from redness, edema or discomfort.  No evidence of extravasations.    Discharge Plan:   Patient and/or family verbalized understanding of discharge instructions and all questions answered.  Copy of AVS reviewed with patient and/or family.  Patient will return tomorrow for next appointment.  Patient discharged in stable condition accompanied by: son.  Departure Mode: Ambulatory.    Alfa Morgan RN

## 2023-06-13 NOTE — PROGRESS NOTES
"Roc Parkinson is a 89 year old male who presents for:  Chief Complaint   Patient presents with     Oncology Clinic Visit     Multiple myeloma not having achieved remission         Initial Vitals:  /66  Pulse 65  Temp 98  F (36.7  C) (Oral)  Resp 18 Estimated body mass index is 27.69 kg/(m^2) as calculated from the following:    Height as of 3/8/17: 1.778 m (5' 10\").    Weight as of 3/8/17: 87.5 kg (193 lb).. There is no height or weight on file to calculate BSA. BP completed using cuff size: large  Data Unavailable No LMP for male patient. Allergies and medications reviewed.     Medications: Medication refills not needed today.  Pharmacy name entered into [employer]:    [facility] [address] [place] [address] [id number] [facility] [address] [place] [address] PHARMACY [place], [place] - [address]    Comments: Follow up Multiple myeloma not having achieved remission     5 minutes for nursing intake (face to face time)   Kiya Drake MA    DISCHARGE PLAN:  Next appointments: See patient instruction section-- Ava  Departure Mode: Ambulatory  Accompanied by: RN to Infusion for Velcade with RN-to-RN report given to KENDALL Gillespie.  10 minutes for nursing discharge (face to face time)   Bere Pace RN    1.  Continue Velcade and dexamethasone. Velcade today.  2.  CBC, diff weekly- transfuse to keep hemoglobin of 8 and platelet of 10,000.   3.  RTC MD 4 weeks.   4.  Continue Zometa  5.  Labs today- SPEP, IFXN, light chains  6-  Please fax note to Dr. Salmeron [facility]      1020am: Critical platelet count called from lab of 43K.  Reported to Dr. Quinn.  Orders: Go ahead with treatment.  Reported to KENDALL Gillespie/ Infusion  Jeanie Padgett RN      3/21/2017 Tue 10:00 AM 10:00 A  10:00 A 20 UAURO [[id number]] UAURO [[id number]] MOODY HICKMAN [[id number]]  UA CYF [[id number]] UMP CYSTOSCOPY [[id number]] 1 month Cysto(schedule the appt as POSSIBLE cysto)       3/22/2017 Wed  " Supportive care    Push fluids  Salt water gargle,  chloraseptic, or cepacol if able to      Call if difficulty swallowing,poor fluid intake or urine output, persistent high  fevers, cold symptoms  diarrhea, vomiting, or  any other concerns    9:30 AM  9:30 A 300 [facility] [[id number]] SH INFUSION CHAIR 5 [[id number]] LEVEL 5 [667] Labs, Zometa, Velcade & Possible Transfusion       3/29/2017 Wed  9:00 AM  9:00 A 300 [facility] [[id number]] SH INFUSION CHAIR 5 [[id number]] LEVEL 5 [667] C4D15 Velcade, Labs/Possible Transfusion       4/5/2017 Wed  9:00 AM  9:00 A 300 [facility] [[id number]] SH INFUSION CHAIR 4 [[id number]] LEVEL 5 [667] C4D22 Velcade, Labs/Possible Transfusion       4/12/2017 Wed  9:00 AM  9:00 A 300 [facility] [[id number]] SH INFUSION CHAIR 7 [[id number]] LEVEL 5 [667] C5D1 Velcade, Labs/Possible Transfusion       4/12/2017 Wed  9:30 AM  9:30 A 15 [facility] [[id number]] ALISON JON [[id number]] RETURN [657] Return Multiple Myeloma       4/19/2017 Wed  9:00 AM  9:00 A 300 [facility] [[id number]] SH INFUSION CHAIR 6 [[id number]] LEVEL 5 [667] C5D8 Velcade, Zometa, Labs/Possible Transfusion

## 2023-11-06 NOTE — PROGRESS NOTES
Infusion Nursing Note:  Roc Parkinson presents today for Kyprolis.    Patient seen by provider today: No   present during visit today: Not Applicable.    Note: no complaints or concerns.    Intravenous Access:  Peripheral IV placed.    Treatment Conditions:  Lab Results   Component Value Date    HGB 8.9 02/07/2018     Lab Results   Component Value Date    WBC 3.6 02/07/2018      Lab Results   Component Value Date    ANEU 2.6 02/07/2018     Lab Results   Component Value Date     02/07/2018          Post Infusion Assessment:  Patient tolerated infusion without incident.  Site patent and intact, free from redness, edema or discomfort.  No evidence of extravasations.  Access discontinued per protocol.    Discharge Plan:   Discharge instructions reviewed with: Patient.  Patient and/or family verbalized understanding of discharge instructions and all questions answered.  Copy of AVS reviewed with patient and/or family.  Patient will return 2/8/18 for next appointment.  Patient discharged in stable condition accompanied by: self.  Departure Mode: Ambulatory.    Esthela Oconnell RN                         Warm

## 2023-12-01 NOTE — PROGRESS NOTES
Infusion Nursing Note:  Roc Parkinson presents today for aranesp.    Patient seen by provider today: Yes: Kai   present during visit today: Not Applicable.    Note: Dr. Quinn changed chemo regimen. Patient will not receive Kyprolis today and will start Daratumumab weekly.     Intravenous Access:  Labs drawn without difficulty.  Peripheral IV placed.    Treatment Conditions:  Lab Results   Component Value Date    HGB 8.0 03/14/2018     Lab Results   Component Value Date    WBC 4.1 03/14/2018      Lab Results   Component Value Date    ANEU 3.5 03/14/2018     Lab Results   Component Value Date    PLT 72 03/14/2018      Results reviewed, labs MET treatment parameters, ok to proceed with treatment.  Hgb 8 okay for aranesp    Post Infusion Assessment:  Patient tolerated injection without incident.  Site patent and intact, free from redness, edema or discomfort.  No evidence of extravasations.  Access discontinued per protocol.    Discharge Plan:   Discharge instructions reviewed with: Patient.  Patient and/or family verbalized understanding of discharge instructions and all questions answered.  Copy of AVS reviewed with patient and/or family.  Patient will return next week for next appointment.  Patient discharged in stable condition accompanied by: .  Departure Mode: Ambulatory.    Patsy Singh RN   Please contact patient. Short refill provided, will need appt before further refills.    NORA MIX, DO

## 2024-07-26 NOTE — DISCHARGE SUMMARY
[facility]    Discharge Summary  Hospitalist    Date of Admission:  3/22/2018  Date of Discharge:  3/27/2018  Discharging Provider: Cindy Kitchen  Date of Service (when I saw the patient): 03/27/18    Discharge Diagnoses   Paroxysmal atrial fibrillation with RVR  Suspected bibasilar pneumonia R>L with right pleural effusion and hypoxic resp failure  Mild diastolic congestive heart failure  Multiple myeloma  Hypertension  Type 2 diabetes  Chemotherapy-induced pancytopenia  Hypothyroidism  GERD      History of Present Illness   Roc Parkinson is an 89 year old male with PmHx of multiple myeloma initially diagnosed in 11/2016, MDS, prostate carcinoma, bladder carcinoma, paroxysmal atrial fibrillation, Hypertension, Hyperlipidemia, pancytopenia due to multiple myeloma and chemotherapy, who was admitted on 3/22/18 from the Oncology Clinic, due to symptoms of cough, shortness of breath, palpitations and tremors, 1.5hrs into receiving his 1st cycle of daratumumab/dexamethasone. He was found to be in AFIB rate 140s-150s and sent to the ER.  See H & P for details.     Hospital Course   Roc Parkinson was admitted on 3/22/2018.  The following problems were addressed during his hospitalization:    Paroxysmal Atrial fibrillation with rapid ventricular response:  Presented with RVR, currently in NSR. S/P IV diltiazem 10mg x 2 doses and IV diltiazem drip.  Transitioned to toprol XL 50mg qd.The pt is not on anticoagulation due to history of lower gastrointestinal bleed.  ECHO on 01/11/2018 showed, LVEF 65%, no regional wall motion abnormalities, RV was normal, no hemodynamically significant valvular abnormalities. TSH on 3/23/18 was normal.    -- will continue Toprol XL daily. PTA Lopressor d/c'd     Suspected bibasal pneumonia R>L, with right pleural effusion and hypoxic respiratory failure:   The pt is immunocompromised with underlying active myeloma and recent chemotherapy. Initially treated with  vanc/zosyn and Levaquin. Cultures remain negative. Weaned off oxygen this morning. vanc discontinued yesterday and d/c zosyn today.    - will discharge on Levaquin daily x 7 more days to complete 10 days course.   - SLP eval completed. No aspiration noted. Discharging on DD3 diet and patient prefers that.   - weaned off oxygen this AM     Mild congetive heart failure: BNP was 7826 on 3/25/18. D-dimer on 3/25/18 was normal. S/P IV lasix 40mg x 1 dose with good output 3/25/18. ECHO on 3/26/18 revealed, LVEF 55-60%, LV and RV size are normal. LA is mildly dilated.   RA is normal. No regional wall motion abnormalities were noted  - resume PTA lasix at discharge.      Multiple myeloma:  Appreciate Oncology input. pt was commenced on daratumumab/dexamethasone on 3/22/18,  due to progression of his myeloma. Continue acyclovir prophylaxis for herpes zoster. outpt Oncology follow up.       Hypertension:  Continue amlodipine 10mg qd and toprol 50mg XL.     Type 2, Diabetes mellitus:  Pt is on oral dexamethasone qd with chemotherapy. HbA1C was 5.5% on 3/22/18. Continue Levemir  Decreased to 10 units qd. Stop glipizide. Continue mealtime Aspart per PTA regimen    Chemotherapy induced pancytopenia:   Hemoglobin Otoniel 7.6 s/p 1 unit pRBC in hospital. Hgb 10 on day of discharge.   WBC 3.5, ANC 2.6 on day of discharge  Plat 96.   - no signs of active bleed and stable hemodynamically    Hypothyroidism: continue levothyroxine.      GERD Prophylaxis: continue omeprazole.        # Discharge Pain Plan:   - Patient currently has NO PAIN and is not being prescribed pain medications on discharge.    Cindy Kitchen    Significant Results and Procedures   See labs and imaging below.     Pending Results   Unresulted Labs Ordered in the Past 30 Days of this Admission     No orders found from 1/21/2018 to 3/23/2018.          Code Status   DNR / DNI       Primary Care Physician   Dickson Reich    Physical Exam   Temp: 97.6  F (36.4   C) Temp src: Oral BP: 160/83   Heart Rate: 71 Resp: 18 SpO2: 94 % O2 Device: None (Room air) Oxygen Delivery: 2 LPM  Vitals:    03/24/18 0640 03/26/18 0700 03/27/18 0450   Weight: 95.1 kg (209 lb 9.6 oz) 91.7 kg (202 lb 3.2 oz) 90.3 kg (199 lb)     Vital Signs with Ranges  Temp:  [97.6  F (36.4  C)-98.3  F (36.8  C)] 97.6  F (36.4  C)  Heart Rate:  [64-78] 71  Resp:  [18] 18  BP: (156-166)/(73-83) 160/83  SpO2:  [94 %-97 %] 94 %  I/O last 3 completed shifts:  In: 1180 [P.O.:1180]  Out: 2050 [Urine:2050]  General: alert, awake and no apparent distress  CVS: regular rate and rhythm  Respiratory: right lung base mild crackles  Abd: soft and non-tender  Ext: no LE edema    Discharge Disposition   Discharged to short-term care facility  Condition at discharge: Stable    Consultations This Hospital Stay   HEMATOLOGY & ONCOLOGY IP CONSULT  SPEECH LANGUAGE PATH ADULT IP CONSULT  PHYSICAL THERAPY ADULT IP CONSULT  OCCUPATIONAL THERAPY ADULT IP CONSULT  PHARMACY TO DOSE VANCO  PHYSICAL THERAPY ADULT IP CONSULT  OCCUPATIONAL THERAPY ADULT IP CONSULT    Time Spent on this Encounter   ICindy, personally saw the patient today and spent 40 minutes discharging this patient.    Discharge Orders     General info for SNF   Length of Stay Estimate: Short Term Care: Estimated # of Days <30  Condition at Discharge: Improving  Level of care:skilled   Rehabilitation Potential: Fair  Admission H&P remains valid and up-to-date: Yes  Recent Chemotherapy: N/A  Use Nursing Home Standing Orders: Yes     Mantoux instructions   Give two-step Mantoux (PPD) Per Facility Policy Yes     Glucose monitor nursing POCT   Before meals and at bedtime     Intake and output   Every shift     Follow Up and recommended labs and tests   Follow up with Nursing home physician.  No follow up labs or test are needed.  Recommend follow up Chest X-ray in 6 weeks.     Activity - Up with nursing assistance     Activity - Up with assistive device      DNR/DNI     Physical Therapy Adult Consult   Evaluate and treat as clinically indicated.    Reason:  deconditioning     Occupational Therapy Adult Consult   Evaluate and treat as clinically indicated.    Reason:  deconditioning     Fall precautions     Advance Diet as Tolerated   Follow this diet upon discharge: Orders Placed This Encounter     Dysphagia Diet Level 3 Advanced Thin Liquids (water, ice chips, juice, milk gelatin, ice cream, etc)       Discharge Medications   Current Discharge Medication List      START taking these medications    Details   metoprolol succinate (TOPROL-XL) 50 MG 24 hr tablet Take 1 tablet (50 mg) by mouth daily  Qty: 30 tablet    Associated Diagnoses: Atrial fibrillation with rapid ventricular response (H)      guaiFENesin-dextromethorphan (ROBITUSSIN DM) 100-10 MG/5ML syrup Take 5 mLs by mouth every 6 hours as needed for cough or congestion  Qty: 560 mL    Associated Diagnoses: Cough      levofloxacin (LEVAQUIN) 500 MG tablet Take 1 tablet (500 mg) by mouth daily for 7 days  Qty: 7 tablet, Refills: 0    Associated Diagnoses: Pneumonia of right lung due to infectious organism, unspecified part of lung         CONTINUE these medications which have CHANGED    Details   LORazepam (ATIVAN) 0.5 MG tablet Take 1 tablet (0.5 mg) by mouth every 4 hours as needed (Anxiety, Nausea/Vomiting or Sleep)  Qty: 4 tablet, Refills: 5    Associated Diagnoses: Multiple myeloma not having achieved remission (H)      insulin detemir (LEVEMIR FLEXPEN/FLEXTOUCH) 100 UNIT/ML injection Inject 10 Units Subcutaneous every morning (before breakfast)  Qty: 15 mL, Refills: 1    Associated Diagnoses: Type 2 diabetes mellitus with diabetic chronic kidney disease, unspecified CKD stage, unspecified long term insulin use status (H); Uncontrolled type 2 diabetes mellitus with hyperglycemia, unspecified long term insulin use status (H); Type 2 diabetes mellitus with stage 3 chronic kidney disease, with long-term  current use of insulin (H)      amLODIPine (NORVASC) 10 MG tablet Take 1 tablet (10 mg) by mouth daily  Qty: 30 tablet    Associated Diagnoses: Essential hypertension         CONTINUE these medications which have NOT CHANGED    Details   dexamethasone (DECADRON) 4 MG tablet Take 20 mg by mouth See Admin Instructions Take 20mg on day 1,2,8,9,15,16,22 & 23 before Carfilzomib dose.      !! INSULIN ASPART SC Inject 9 Units Subcutaneous daily (with dinner)      !! INSULIN ASPART SC Inject Subcutaneous 4 times daily Sliding scale:  140-175 1 unit  176-210 2 units  211-245 3 units  246-280 4 units  281-315 5 units  316-350 6 units  351-385 7 units  386-420 8 units  >420 9 units      METHOCARBAMOL PO Take 750 mg by mouth 3 times daily as needed for muscle spasms      prochlorperazine (COMPAZINE) 10 MG tablet Take 1 tablet (10 mg) by mouth every 6 hours as needed (Nausea/Vomiting)  Qty: 30 tablet, Refills: 5    Associated Diagnoses: Multiple myeloma not having achieved remission (H)      acyclovir (ZOVIRAX) 400 MG tablet Take 1 tablet (400 mg) by mouth 2 times daily Start 1 week prior to daratumumab and continue for 3 months after treatment is complete.  Qty: 60 tablet, Refills: 11    Associated Diagnoses: Multiple myeloma not having achieved remission (H)      omeprazole (PRILOSEC) 20 MG CR capsule TAKE 1 CAPSULE BY MOUTH EVERY DAY  Qty: 90 capsule, Refills: 3    Associated Diagnoses: Congenital hiatus hernia      levothyroxine (SYNTHROID/LEVOTHROID) 25 MCG tablet TAKE 1 TABLET BY MOUTH EVERY DAY  Qty: 90 tablet, Refills: 0    Associated Diagnoses: Acquired hypothyroidism      !! INSULIN ASPART SC Inject 13 Units Subcutaneous every morning       !! INSULIN ASPART SC Inject 11 Units Subcutaneous daily (before lunch)       !! INSULIN ASPART SC Inject Subcutaneous At Bedtime Inject 4 unit subcutaneously at bedtime for Diabetes II Ok to use Humalog insulin with same order details      VITAMIN D, CHOLECALCIFEROL, PO Take 1,000  Units by mouth      polyethylene glycol (MIRALAX/GLYCOLAX) Packet Take 17 g by mouth 2 times daily as needed (constipation)  Qty: 7 packet    Associated Diagnoses: Slow transit constipation      LISINOPRIL PO Take 15 mg by mouth daily      furosemide (LASIX) 20 MG tablet Take 1 tablet (20 mg) by mouth daily  Qty: 90 tablet, Refills: 1    Associated Diagnoses: Multiple myeloma not having achieved remission (H)      gemfibrozil (LOPID) 600 MG tablet TAKE 1 TABLET BY MOUTH TWICE DAILY  Qty: 180 tablet, Refills: 1    Associated Diagnoses: Hyperlipidemia      Ascorbic Acid (VITAMIN C PO) Take 500 mg by mouth daily       ferrous sulfate (IRON) 325 (65 FE) MG tablet Take 325 mg by mouth daily (with breakfast)      Selenium 200 MCG TABS Take 100 mcg by mouth daily. Pt takes one half tab of the 200 mcg daily       !! FREESTYLE LITE test strip USE TO TEST FOUR TIMES DAILY  Qty: 400 strip, Refills: 0    Associated Diagnoses: Uncontrolled type 1 diabetes mellitus with stage 3 chronic kidney disease (H)      Blood Glucose Monitoring Suppl MISC 3 times daily (before meals)       !! B-D U/F 31G X 8 MM insulin pen needle USE 5 PEN NEEDLES PER DAY OR AS DIRECTED  Qty: 500 each, Refills: 1    Associated Diagnoses: Type 2 diabetes mellitus with stage 3 chronic kidney disease, with long-term current use of insulin (H)      !! FREESTYLE LITE test strip USE 1 STRIP TO TEST TWICE DAILY.  Qty: 200 strip, Refills: 1    Associated Diagnoses: Uncontrolled type 1 diabetes mellitus with stage 3 chronic kidney disease (H)      !! B-D U/F 31G X 8 MM insulin pen needle        !! - Potential duplicate medications found. Please discuss with provider.      STOP taking these medications       GLIPIZIDE PO Comments:   Reason for Stopping:         metoprolol tartrate (LOPRESSOR) 25 MG tablet Comments:   Reason for Stopping:             Allergies   No Known Allergies  Data   Most Recent 3 CBC's:  Recent Labs   Lab Test  03/27/18   0545  03/26/18   0607   03/25/18   0545   03/24/18   0600   WBC  3.5*  3.7*  4.7   --   4.6   HGB  10.0*  9.6*  9.8*   < >  7.6*   MCV  107*  107*   --    --   113*   PLT  96*  94*  82*   --   72*    < > = values in this interval not displayed.      Most Recent 3 BMP's:  Recent Labs   Lab Test  03/27/18   0545  03/26/18   0607  03/23/18   0532   NA  139  141  135   POTASSIUM  3.5  3.6  4.2   CHLORIDE  106  108  106   CO2  24  25  20   BUN  18  22  36*   CR  0.90  0.91  1.06   ANIONGAP  9  8  9   MICHELLE  8.5  8.7  8.0*   GLC  83  134*  446*     Most Recent 2 LFT's:  Recent Labs   Lab Test  03/22/18   1335  03/07/18   1130   AST  16  15   ALT  15  14   ALKPHOS  86  99   BILITOTAL  0.3  0.5     Most Recent INR's and Anticoagulation Dosing History:  Anticoagulation Dose History     Recent Dosing and Labs Latest Ref Rng & Units 11/16/2016 11/17/2016 11/18/2016 11/19/2016 11/20/2016 2/13/2017 1/20/2018    INR 0.86 - 1.14 1.31(H) 1.54(H) 1.57(H) 1.18(H) 1.46(H) 1.15(H) 1.09    INR 0.86 - 1.14 - - - - - - -        Most Recent 3 Troponin's:  Recent Labs   Lab Test  03/22/18   1335  01/20/18   1443  01/20/18   1045   TROPI  <0.015  0.016  0.020     Most Recent Cholesterol Panel:  Recent Labs   Lab Test  05/23/17   1014   CHOL  123   LDL  64   HDL  35*   TRIG  120     Most Recent 6 Bacteria Isolates From Any Culture (See EPIC Reports for Culture Details):  Recent Labs   Lab Test  01/20/18   0439  01/20/18   0424  01/20/18   0314  12/13/17   1049  12/01/17   1600  10/24/17   0929   CULT  No growth  No growth  >100,000 colonies/mL  Escherichia coli ESBL  *  >100,000 colonies/mL  Strain 2  Escherichia coli ESBL  *  Enterobacteriaceae that are susceptible to meropenem are usually susceptible to ertapenem.  ESBL (extended beta lactamase) producing organisms require contact precautions.  >100,000 colonies/mL  Escherichia coli ESBL  ESBL (extended beta lactamase) producing organisms require contact precautions.  *  50,000 to 100,000 colonies/mL  Escherichia  coli ESBL  *  10,000 to 50,000 colonies/mL  Escherichia coli ESBL  *  <10,000 colonies/mL  urogenital janneth    ESBL (extended beta lactamase) producing organisms require contact precautions.  >100,000 colonies/mL  Escherichia coli  *     Most Recent TSH, T4 and A1c Labs:  Recent Labs   Lab Test  03/23/18   0532  03/22/18   1335   09/28/16   1439   TSH  1.78   --    --   5.53*   T4   --    --    --   0.83   A1C   --   5.5   < >  8.8*    < > = values in this interval not displayed.     Results for orders placed or performed during the hospital encounter of 03/22/18   XR Chest 2 Views    Narrative    CHEST TWO VIEWS   3/25/2018 12:21 PM     HISTORY: Acute respiratory failure with right basal crackles, rule out  aspiration pneumonia.     COMPARISON: 1/20/2018.      Impression    IMPRESSION: New right basilar consolidation with right pleural  effusion concerning for pneumonia. There is also small left pleural  effusion and mild left basilar opacity. No pneumothorax visualized.  Cardiac silhouette is unchanged. There are atherosclerotic  calcifications of the aortic arch.    ANGELIQUE CANCINO MD      No

## 2025-02-13 NOTE — TELEPHONE ENCOUNTER
2/13/2025      David Marino  [address]  [place] [address]            Dear David,        This letter is to inform you that you are overdue for a visit. You have not been seen by a provider here at [facility] since 2023 and require a visit for future refills of your medications. Please schedule an appointment to establish care with a new provider here to continue getting future refills. If you are established at a different clinic, you will have to request medications from them. You can schedule an appointment through [employer] or by calling us at [phone]. Please reach out to us with any questions.            Sincerely,        [facility]  [facility].   FYI-  June nurse from [facility], called to report pt has stopped 2 medications without consulting provider. Nurse is also requesting advise. Would PCP like labs drawn while pt is at that office?    Pt reported he stopped taking levemir due to site swelling two weeks ago. He reports BS are stable- unknown readings.     He also stopped taking potassium tablets, because they were to big even after cutting them. He stopped taking 1 week ago.     Nurse asked if ok to draw potassium level and TSH with T4 since T4 was not drawn 02/06/2018.   Verbal approval for blood draw.      Quality 110: Preventive Care And Screening: Influenza Immunization: Influenza Immunization Administered during Influenza season Quality 111:Pneumonia Vaccination Status For Older Adults: Pneumococcal vaccine (PPSV23) administered on or after patient’s 60th birthday and before the end of the measurement period Detail Level: Detailed

## (undated) DEVICE — LINEN TOWEL PACK X5 5464

## (undated) DEVICE — DRAPE SHEET REV FOLD 3/4 9349

## (undated) DEVICE — EYE SHIELD PLASTIC

## (undated) DEVICE — GLOVE PROTEXIS MICRO 7.0  2D73PM70

## (undated) DEVICE — GLOVE PROTEXIS MICRO 6.5  2D73PM65

## (undated) DEVICE — EYE PACK BVI READYPAK KIT #1

## (undated) DEVICE — PACK CATARACT CUSTOM SO DALE SEY32CTFCX

## (undated) DEVICE — EYE SOL BSS 500ML

## (undated) DEVICE — EYE PACK CUSTOM ANTERIOR 45DEG TIP CENTURION PPK6925-01

## (undated) DEVICE — GLOVE PROTEXIS MICRO 8.0  2D73PM80

## (undated) DEVICE — EYE KNIFE SLIT XSTAR VISITEC 2.6MM 45DEG 373726

## (undated) RX ORDER — LIDOCAINE HYDROCHLORIDE 10 MG/ML
INJECTION, SOLUTION EPIDURAL; INFILTRATION; INTRACAUDAL; PERINEURAL
Status: DISPENSED
Start: 2017-01-01

## (undated) RX ORDER — FENTANYL CITRATE 50 UG/ML
INJECTION, SOLUTION INTRAMUSCULAR; INTRAVENOUS
Status: DISPENSED
Start: 2017-01-01

## (undated) RX ORDER — ONDANSETRON 2 MG/ML
INJECTION INTRAMUSCULAR; INTRAVENOUS
Status: DISPENSED
Start: 2017-01-01